# Patient Record
Sex: MALE | Race: WHITE | HISPANIC OR LATINO | Employment: UNEMPLOYED | URBAN - METROPOLITAN AREA
[De-identification: names, ages, dates, MRNs, and addresses within clinical notes are randomized per-mention and may not be internally consistent; named-entity substitution may affect disease eponyms.]

---

## 2022-10-19 ENCOUNTER — APPOINTMENT (INPATIENT)
Dept: RADIOLOGY | Facility: HOSPITAL | Age: 67
DRG: 637 | End: 2022-10-19
Payer: COMMERCIAL

## 2022-10-19 ENCOUNTER — APPOINTMENT (EMERGENCY)
Dept: RADIOLOGY | Facility: HOSPITAL | Age: 67
DRG: 637 | End: 2022-10-19
Payer: COMMERCIAL

## 2022-10-19 ENCOUNTER — HOSPITAL ENCOUNTER (INPATIENT)
Facility: HOSPITAL | Age: 67
LOS: 7 days | Discharge: HOME HEALTH | DRG: 637 | End: 2022-10-26
Attending: EMERGENCY MEDICINE | Admitting: ANESTHESIOLOGY
Payer: COMMERCIAL

## 2022-10-19 DIAGNOSIS — E10.9 INSULIN DEPENDENT TYPE 1 DIABETES MELLITUS (HCC): ICD-10-CM

## 2022-10-19 DIAGNOSIS — E16.2 HYPOGLYCEMIA: Primary | ICD-10-CM

## 2022-10-19 DIAGNOSIS — I10 ESSENTIAL HYPERTENSION: ICD-10-CM

## 2022-10-19 PROBLEM — D63.8 ANEMIA OF CHRONIC DISEASE: Status: ACTIVE | Noted: 2022-10-19

## 2022-10-19 PROBLEM — F99 PSYCHIATRIC DISORDER: Status: ACTIVE | Noted: 2022-10-19

## 2022-10-19 PROBLEM — E27.40 ADRENAL INSUFFICIENCY (HCC): Status: ACTIVE | Noted: 2022-10-19

## 2022-10-19 PROBLEM — E87.6 HYPOKALEMIA: Status: ACTIVE | Noted: 2022-10-19

## 2022-10-19 PROBLEM — Z86.73 HISTORY OF STROKE: Status: ACTIVE | Noted: 2022-10-19

## 2022-10-19 PROBLEM — I48.0 PAROXYSMAL ATRIAL FIBRILLATION (HCC): Status: ACTIVE | Noted: 2022-10-19

## 2022-10-19 PROBLEM — Z79.4 INSULIN DEPENDENT TYPE 2 DIABETES MELLITUS (HCC): Status: ACTIVE | Noted: 2022-10-19

## 2022-10-19 PROBLEM — E78.5 HYPERLIPIDEMIA: Status: ACTIVE | Noted: 2022-10-19

## 2022-10-19 PROBLEM — E11.9 INSULIN DEPENDENT TYPE 2 DIABETES MELLITUS (HCC): Status: ACTIVE | Noted: 2022-10-19

## 2022-10-19 LAB
AMMONIA PLAS-SCNC: 27 UMOL/L (ref 11–35)
ANION GAP SERPL CALCULATED.3IONS-SCNC: 6 MMOL/L (ref 4–13)
APTT PPP: 32 SECONDS (ref 23–37)
ARTERIAL PATENCY WRIST A: ABNORMAL
BASE EXCESS BLDA CALC-SCNC: 7 MMOL/L (ref -2–3)
BASOPHILS # BLD AUTO: 0.02 THOUSANDS/ÂΜL (ref 0–0.1)
BASOPHILS # BLD AUTO: 0.03 THOUSANDS/ÂΜL (ref 0–0.1)
BASOPHILS NFR BLD AUTO: 0 % (ref 0–1)
BASOPHILS NFR BLD AUTO: 0 % (ref 0–1)
BUN SERPL-MCNC: 8 MG/DL (ref 5–25)
CA-I BLD-SCNC: 1.26 MMOL/L (ref 1.12–1.32)
CALCIUM SERPL-MCNC: 8.4 MG/DL (ref 8.3–10.1)
CHLORIDE SERPL-SCNC: 111 MMOL/L (ref 96–108)
CO2 SERPL-SCNC: 27 MMOL/L (ref 21–32)
CREAT SERPL-MCNC: 0.99 MG/DL (ref 0.6–1.3)
EOSINOPHIL # BLD AUTO: 0.26 THOUSAND/ÂΜL (ref 0–0.61)
EOSINOPHIL # BLD AUTO: 0.28 THOUSAND/ÂΜL (ref 0–0.61)
EOSINOPHIL NFR BLD AUTO: 2 % (ref 0–6)
EOSINOPHIL NFR BLD AUTO: 4 % (ref 0–6)
ERYTHROCYTE [DISTWIDTH] IN BLOOD BY AUTOMATED COUNT: 13.3 % (ref 11.6–15.1)
ERYTHROCYTE [DISTWIDTH] IN BLOOD BY AUTOMATED COUNT: 13.4 % (ref 11.6–15.1)
GFR SERPL CREATININE-BSD FRML MDRD: 79 ML/MIN/1.73SQ M
GLUCOSE SERPL-MCNC: 110 MG/DL (ref 65–140)
GLUCOSE SERPL-MCNC: 125 MG/DL (ref 65–140)
GLUCOSE SERPL-MCNC: 155 MG/DL (ref 65–140)
GLUCOSE SERPL-MCNC: 190 MG/DL (ref 65–140)
GLUCOSE SERPL-MCNC: 195 MG/DL (ref 65–140)
GLUCOSE SERPL-MCNC: 196 MG/DL (ref 65–140)
GLUCOSE SERPL-MCNC: 199 MG/DL (ref 65–140)
GLUCOSE SERPL-MCNC: 304 MG/DL (ref 65–140)
GLUCOSE SERPL-MCNC: 397 MG/DL (ref 65–140)
GLUCOSE SERPL-MCNC: 402 MG/DL (ref 65–140)
GLUCOSE SERPL-MCNC: 459 MG/DL (ref 65–140)
GLUCOSE SERPL-MCNC: 62 MG/DL (ref 65–140)
GLUCOSE SERPL-MCNC: <20 MG/DL (ref 65–140)
GLUCOSE SERPL-MCNC: <20 MG/DL (ref 65–140)
HCO3 BLDA-SCNC: 32 MMOL/L (ref 22–28)
HCT VFR BLD AUTO: 28.7 % (ref 36.5–49.3)
HCT VFR BLD AUTO: 29.6 % (ref 36.5–49.3)
HCT VFR BLD CALC: 26 % (ref 36.5–49.3)
HGB BLD-MCNC: 9.2 G/DL (ref 12–17)
HGB BLD-MCNC: 9.5 G/DL (ref 12–17)
HGB BLDA-MCNC: 8.8 G/DL (ref 12–17)
IMM GRANULOCYTES # BLD AUTO: 0.03 THOUSAND/UL (ref 0–0.2)
IMM GRANULOCYTES # BLD AUTO: 0.08 THOUSAND/UL (ref 0–0.2)
IMM GRANULOCYTES NFR BLD AUTO: 0 % (ref 0–2)
IMM GRANULOCYTES NFR BLD AUTO: 1 % (ref 0–2)
LACTATE SERPL-SCNC: 1.3 MMOL/L (ref 0.5–2)
LYMPHOCYTES # BLD AUTO: 2.13 THOUSANDS/ÂΜL (ref 0.6–4.47)
LYMPHOCYTES # BLD AUTO: 2.24 THOUSANDS/ÂΜL (ref 0.6–4.47)
LYMPHOCYTES NFR BLD AUTO: 19 % (ref 14–44)
LYMPHOCYTES NFR BLD AUTO: 27 % (ref 14–44)
MCH RBC QN AUTO: 31.3 PG (ref 26.8–34.3)
MCH RBC QN AUTO: 31.7 PG (ref 26.8–34.3)
MCHC RBC AUTO-ENTMCNC: 32.1 G/DL (ref 31.4–37.4)
MCHC RBC AUTO-ENTMCNC: 32.1 G/DL (ref 31.4–37.4)
MCV RBC AUTO: 97 FL (ref 82–98)
MCV RBC AUTO: 99 FL (ref 82–98)
MONOCYTES # BLD AUTO: 1.27 THOUSAND/ÂΜL (ref 0.17–1.22)
MONOCYTES # BLD AUTO: 1.39 THOUSAND/ÂΜL (ref 0.17–1.22)
MONOCYTES NFR BLD AUTO: 11 % (ref 4–12)
MONOCYTES NFR BLD AUTO: 18 % (ref 4–12)
NEUTROPHILS # BLD AUTO: 3.98 THOUSANDS/ÂΜL (ref 1.85–7.62)
NEUTROPHILS # BLD AUTO: 7.95 THOUSANDS/ÂΜL (ref 1.85–7.62)
NEUTS SEG NFR BLD AUTO: 51 % (ref 43–75)
NEUTS SEG NFR BLD AUTO: 67 % (ref 43–75)
NRBC BLD AUTO-RTO: 0 /100 WBCS
NRBC BLD AUTO-RTO: 0 /100 WBCS
PCO2 BLD: 33 MMOL/L (ref 21–32)
PCO2 BLD: 47.6 MM HG (ref 36–44)
PH BLD: 7.44 [PH] (ref 7.35–7.45)
PLATELET # BLD AUTO: 178 THOUSANDS/UL (ref 149–390)
PLATELET # BLD AUTO: 210 THOUSANDS/UL (ref 149–390)
PMV BLD AUTO: 10.6 FL (ref 8.9–12.7)
PMV BLD AUTO: 10.8 FL (ref 8.9–12.7)
PO2 BLD: >400 MM HG (ref 75–129)
POTASSIUM BLD-SCNC: 3.8 MMOL/L (ref 3.5–5.3)
POTASSIUM SERPL-SCNC: 3.3 MMOL/L (ref 3.5–5.3)
RBC # BLD AUTO: 2.9 MILLION/UL (ref 3.88–5.62)
RBC # BLD AUTO: 3.04 MILLION/UL (ref 3.88–5.62)
SAMPLE SITE: ABNORMAL
SODIUM BLD-SCNC: 142 MMOL/L (ref 136–145)
SODIUM SERPL-SCNC: 144 MMOL/L (ref 135–147)
SPECIMEN SOURCE: ABNORMAL
WBC # BLD AUTO: 11.82 THOUSAND/UL (ref 4.31–10.16)
WBC # BLD AUTO: 7.84 THOUSAND/UL (ref 4.31–10.16)

## 2022-10-19 PROCEDURE — 99291 CRITICAL CARE FIRST HOUR: CPT | Performed by: ANESTHESIOLOGY

## 2022-10-19 PROCEDURE — 82948 REAGENT STRIP/BLOOD GLUCOSE: CPT

## 2022-10-19 PROCEDURE — 82803 BLOOD GASES ANY COMBINATION: CPT

## 2022-10-19 PROCEDURE — 96361 HYDRATE IV INFUSION ADD-ON: CPT

## 2022-10-19 PROCEDURE — 70450 CT HEAD/BRAIN W/O DYE: CPT

## 2022-10-19 PROCEDURE — 99285 EMERGENCY DEPT VISIT HI MDM: CPT

## 2022-10-19 PROCEDURE — 85025 COMPLETE CBC W/AUTO DIFF WBC: CPT | Performed by: NURSE PRACTITIONER

## 2022-10-19 PROCEDURE — NC001 PR NO CHARGE: Performed by: SURGERY

## 2022-10-19 PROCEDURE — 99223 1ST HOSP IP/OBS HIGH 75: CPT | Performed by: INTERNAL MEDICINE

## 2022-10-19 PROCEDURE — 31500 INSERT EMERGENCY AIRWAY: CPT

## 2022-10-19 PROCEDURE — 85730 THROMBOPLASTIN TIME PARTIAL: CPT | Performed by: NURSE PRACTITIONER

## 2022-10-19 PROCEDURE — 0BH17EZ INSERTION OF ENDOTRACHEAL AIRWAY INTO TRACHEA, VIA NATURAL OR ARTIFICIAL OPENING: ICD-10-PCS | Performed by: ANESTHESIOLOGY

## 2022-10-19 PROCEDURE — 83605 ASSAY OF LACTIC ACID: CPT | Performed by: NURSE PRACTITIONER

## 2022-10-19 PROCEDURE — 99222 1ST HOSP IP/OBS MODERATE 55: CPT | Performed by: INTERNAL MEDICINE

## 2022-10-19 PROCEDURE — 82330 ASSAY OF CALCIUM: CPT

## 2022-10-19 PROCEDURE — 31500 INSERT EMERGENCY AIRWAY: CPT | Performed by: SURGERY

## 2022-10-19 PROCEDURE — 85014 HEMATOCRIT: CPT

## 2022-10-19 PROCEDURE — 84295 ASSAY OF SERUM SODIUM: CPT

## 2022-10-19 PROCEDURE — 94002 VENT MGMT INPAT INIT DAY: CPT

## 2022-10-19 PROCEDURE — 0BH18EZ INSERTION OF ENDOTRACHEAL AIRWAY INTO TRACHEA, VIA NATURAL OR ARTIFICIAL OPENING ENDOSCOPIC: ICD-10-PCS | Performed by: ANESTHESIOLOGY

## 2022-10-19 PROCEDURE — 82947 ASSAY GLUCOSE BLOOD QUANT: CPT

## 2022-10-19 PROCEDURE — 80048 BASIC METABOLIC PNL TOTAL CA: CPT | Performed by: STUDENT IN AN ORGANIZED HEALTH CARE EDUCATION/TRAINING PROGRAM

## 2022-10-19 PROCEDURE — 85025 COMPLETE CBC W/AUTO DIFF WBC: CPT | Performed by: STUDENT IN AN ORGANIZED HEALTH CARE EDUCATION/TRAINING PROGRAM

## 2022-10-19 PROCEDURE — G1004 CDSM NDSC: HCPCS

## 2022-10-19 PROCEDURE — 5A1935Z RESPIRATORY VENTILATION, LESS THAN 24 CONSECUTIVE HOURS: ICD-10-PCS | Performed by: ANESTHESIOLOGY

## 2022-10-19 PROCEDURE — 84132 ASSAY OF SERUM POTASSIUM: CPT

## 2022-10-19 PROCEDURE — NC001 PR NO CHARGE: Performed by: PHYSICIAN ASSISTANT

## 2022-10-19 PROCEDURE — 94760 N-INVAS EAR/PLS OXIMETRY 1: CPT

## 2022-10-19 PROCEDURE — 36415 COLL VENOUS BLD VENIPUNCTURE: CPT | Performed by: STUDENT IN AN ORGANIZED HEALTH CARE EDUCATION/TRAINING PROGRAM

## 2022-10-19 PROCEDURE — 82140 ASSAY OF AMMONIA: CPT | Performed by: NURSE PRACTITIONER

## 2022-10-19 PROCEDURE — 96360 HYDRATION IV INFUSION INIT: CPT

## 2022-10-19 PROCEDURE — 36600 WITHDRAWAL OF ARTERIAL BLOOD: CPT

## 2022-10-19 PROCEDURE — 93005 ELECTROCARDIOGRAM TRACING: CPT

## 2022-10-19 RX ORDER — HYDROCORTISONE 10 MG/1
10 TABLET ORAL EVERY 24 HOURS
Status: DISCONTINUED | OUTPATIENT
Start: 2022-10-19 | End: 2022-10-20

## 2022-10-19 RX ORDER — FLUDROCORTISONE ACETATE 0.1 MG/1
0.2 TABLET ORAL DAILY
Status: DISCONTINUED | OUTPATIENT
Start: 2022-10-19 | End: 2022-10-26 | Stop reason: HOSPADM

## 2022-10-19 RX ORDER — DEXTROSE 50 % IN WATER 50 %
SYRINGE (ML) INTRAVENOUS CODE/TRAUMA/SEDATION MEDICATION
Status: COMPLETED | OUTPATIENT
Start: 2022-10-19 | End: 2022-10-19

## 2022-10-19 RX ORDER — FENTANYL CITRATE 50 UG/ML
INJECTION, SOLUTION INTRAMUSCULAR; INTRAVENOUS
Status: COMPLETED
Start: 2022-10-19 | End: 2022-10-19

## 2022-10-19 RX ORDER — SERTRALINE HYDROCHLORIDE 100 MG/1
100 TABLET, FILM COATED ORAL DAILY
COMMUNITY
Start: 2022-03-16 | End: 2023-03-16

## 2022-10-19 RX ORDER — ATORVASTATIN CALCIUM 10 MG/1
10 TABLET, FILM COATED ORAL
Status: DISCONTINUED | OUTPATIENT
Start: 2022-10-20 | End: 2022-10-26 | Stop reason: HOSPADM

## 2022-10-19 RX ORDER — SUCCINYLCHOLINE/SOD CL,ISO/PF 100 MG/5ML
SYRINGE (ML) INTRAVENOUS CODE/TRAUMA/SEDATION MEDICATION
Status: COMPLETED | OUTPATIENT
Start: 2022-10-19 | End: 2022-10-19

## 2022-10-19 RX ORDER — ASPIRIN 81 MG/1
81 TABLET, CHEWABLE ORAL DAILY
Status: DISCONTINUED | OUTPATIENT
Start: 2022-10-20 | End: 2022-10-26 | Stop reason: HOSPADM

## 2022-10-19 RX ORDER — SERTRALINE HYDROCHLORIDE 100 MG/1
100 TABLET, FILM COATED ORAL DAILY
Status: DISCONTINUED | OUTPATIENT
Start: 2022-10-19 | End: 2022-10-26 | Stop reason: HOSPADM

## 2022-10-19 RX ORDER — RISPERIDONE 0.25 MG/1
0.5 TABLET, FILM COATED ORAL 2 TIMES DAILY
Status: DISCONTINUED | OUTPATIENT
Start: 2022-10-19 | End: 2022-10-26 | Stop reason: HOSPADM

## 2022-10-19 RX ORDER — INSULIN HUMAN 100 [IU]/ML
6 INJECTION, SOLUTION PARENTERAL
COMMUNITY
Start: 2022-09-25 | End: 2022-10-26

## 2022-10-19 RX ORDER — FENTANYL CITRATE 50 UG/ML
100 INJECTION, SOLUTION INTRAMUSCULAR; INTRAVENOUS ONCE
Status: COMPLETED | OUTPATIENT
Start: 2022-10-19 | End: 2022-10-19

## 2022-10-19 RX ORDER — POTASSIUM CHLORIDE 750 MG/1
20 TABLET, FILM COATED, EXTENDED RELEASE ORAL 2 TIMES DAILY
COMMUNITY
Start: 2022-10-13 | End: 2023-10-13

## 2022-10-19 RX ORDER — ATORVASTATIN CALCIUM 10 MG/1
1 TABLET, FILM COATED ORAL DAILY
COMMUNITY
Start: 2022-07-12

## 2022-10-19 RX ORDER — HYDROCORTISONE 10 MG/1
10 TABLET ORAL 2 TIMES DAILY
COMMUNITY
Start: 2022-08-17

## 2022-10-19 RX ORDER — HYDROCORTISONE 10 MG/1
10 TABLET ORAL 2 TIMES DAILY
Status: DISCONTINUED | OUTPATIENT
Start: 2022-10-19 | End: 2022-10-19

## 2022-10-19 RX ORDER — ASPIRIN 81 MG/1
81 TABLET, CHEWABLE ORAL DAILY
COMMUNITY

## 2022-10-19 RX ORDER — MIDODRINE HYDROCHLORIDE 5 MG/1
10 TABLET ORAL
Status: DISCONTINUED | OUTPATIENT
Start: 2022-10-19 | End: 2022-10-22

## 2022-10-19 RX ORDER — FLUDROCORTISONE ACETATE 0.1 MG/1
0.2 TABLET ORAL DAILY
COMMUNITY
Start: 2022-09-26 | End: 2022-11-08 | Stop reason: SDUPTHER

## 2022-10-19 RX ORDER — HYDROCORTISONE 10 MG/1
10 TABLET ORAL EVERY 24 HOURS
Status: DISCONTINUED | OUTPATIENT
Start: 2022-10-20 | End: 2022-10-20

## 2022-10-19 RX ORDER — CHLORHEXIDINE GLUCONATE 0.12 MG/ML
15 RINSE ORAL EVERY 12 HOURS SCHEDULED
Status: DISCONTINUED | OUTPATIENT
Start: 2022-10-19 | End: 2022-10-26 | Stop reason: HOSPADM

## 2022-10-19 RX ORDER — RISPERIDONE 0.5 MG/1
1 TABLET, FILM COATED ORAL 2 TIMES DAILY
COMMUNITY
Start: 2022-07-11

## 2022-10-19 RX ORDER — PROPOFOL 10 MG/ML
5-50 INJECTION, EMULSION INTRAVENOUS
Status: DISCONTINUED | OUTPATIENT
Start: 2022-10-19 | End: 2022-10-20

## 2022-10-19 RX ORDER — FENTANYL CITRATE 50 UG/ML
50 INJECTION, SOLUTION INTRAMUSCULAR; INTRAVENOUS
Status: DISCONTINUED | OUTPATIENT
Start: 2022-10-19 | End: 2022-10-20

## 2022-10-19 RX ORDER — ETOMIDATE 2 MG/ML
INJECTION INTRAVENOUS CODE/TRAUMA/SEDATION MEDICATION
Status: COMPLETED | OUTPATIENT
Start: 2022-10-19 | End: 2022-10-19

## 2022-10-19 RX ORDER — POTASSIUM CHLORIDE 750 MG/1
20 TABLET, EXTENDED RELEASE ORAL 2 TIMES DAILY
Status: DISCONTINUED | OUTPATIENT
Start: 2022-10-19 | End: 2022-10-20

## 2022-10-19 RX ORDER — MIDODRINE HYDROCHLORIDE 10 MG/1
10 TABLET ORAL
COMMUNITY
Start: 2022-09-26 | End: 2022-10-26

## 2022-10-19 RX ORDER — DEXTROSE MONOHYDRATE 25 G/50ML
INJECTION, SOLUTION INTRAVENOUS
Status: DISPENSED
Start: 2022-10-19 | End: 2022-10-20

## 2022-10-19 RX ORDER — MAGNESIUM SULFATE HEPTAHYDRATE 40 MG/ML
2 INJECTION, SOLUTION INTRAVENOUS ONCE
Status: COMPLETED | OUTPATIENT
Start: 2022-10-19 | End: 2022-10-19

## 2022-10-19 RX ORDER — INSULIN LISPRO 100 [IU]/ML
1-6 INJECTION, SOLUTION INTRAVENOUS; SUBCUTANEOUS
Status: DISCONTINUED | OUTPATIENT
Start: 2022-10-19 | End: 2022-10-20

## 2022-10-19 RX ADMIN — FENTANYL CITRATE 100 MCG: 50 INJECTION INTRAMUSCULAR; INTRAVENOUS at 23:45

## 2022-10-19 RX ADMIN — RISPERIDONE 0.5 MG: 0.25 TABLET ORAL at 13:45

## 2022-10-19 RX ADMIN — MIDODRINE HYDROCHLORIDE 10 MG: 5 TABLET ORAL at 13:45

## 2022-10-19 RX ADMIN — SODIUM CHLORIDE 1000 ML: 0.9 INJECTION, SOLUTION INTRAVENOUS at 07:20

## 2022-10-19 RX ADMIN — APIXABAN 5 MG: 5 TABLET, FILM COATED ORAL at 17:28

## 2022-10-19 RX ADMIN — POTASSIUM CHLORIDE 20 MEQ: 750 TABLET, EXTENDED RELEASE ORAL at 17:31

## 2022-10-19 RX ADMIN — SERTRALINE 100 MG: 100 TABLET, FILM COATED ORAL at 13:41

## 2022-10-19 RX ADMIN — Medication 12.5 MG: at 13:41

## 2022-10-19 RX ADMIN — DEXTROSE MONOHYDRATE 25 G: 500 INJECTION PARENTERAL at 22:17

## 2022-10-19 RX ADMIN — Medication 12.5 MG: at 17:31

## 2022-10-19 RX ADMIN — Medication 100 MG: at 22:17

## 2022-10-19 RX ADMIN — MIDODRINE HYDROCHLORIDE 10 MG: 5 TABLET ORAL at 17:33

## 2022-10-19 RX ADMIN — HYDROCORTISONE 10 MG: 10 TABLET ORAL at 16:10

## 2022-10-19 RX ADMIN — FLUDROCORTISONE ACETATE 0.2 MG: 0.1 TABLET ORAL at 13:41

## 2022-10-19 RX ADMIN — RISPERIDONE 0.5 MG: 0.25 TABLET ORAL at 17:32

## 2022-10-19 RX ADMIN — FENTANYL CITRATE 100 MCG: 50 INJECTION, SOLUTION INTRAMUSCULAR; INTRAVENOUS at 23:45

## 2022-10-19 RX ADMIN — MAGNESIUM SULFATE HEPTAHYDRATE 2 G: 40 INJECTION, SOLUTION INTRAVENOUS at 16:11

## 2022-10-19 RX ADMIN — INSULIN HUMAN 4 UNITS: 100 INJECTION, SUSPENSION SUBCUTANEOUS at 16:17

## 2022-10-19 RX ADMIN — INSULIN LISPRO 4 UNITS: 100 INJECTION, SOLUTION INTRAVENOUS; SUBCUTANEOUS at 16:08

## 2022-10-19 RX ADMIN — ETOMIDATE 20 MG: 20 INJECTION, SOLUTION INTRAVENOUS at 22:17

## 2022-10-19 NOTE — ASSESSMENT & PLAN NOTE
Recurrent ED visits (3) in the last week for similar episodes - today blood sugar dropped to 35 requiring glucagon dosing by EMS w/ rebound and improvement in mentation back to baseline  Hold basal/prandial insulin - maintain slowly on SSI coverage per Accu-Cheks at this time  Check proinsulin and C-peptide - check fasting insulin tomorrow morning - continue to monitor blood sugars and implement hypoglycemia protocol as necessary  Further workup per endocrinology

## 2022-10-19 NOTE — CASE MANAGEMENT
Case Management ED Discharge Planning Note    Patient name Jami Rivera  Location ED 16/ED 16 MRN [id number]  : 1955 Date 10/19/2022        OBJECTIVE:  Predictive Model Details         34% Factor Value    Risk of Hospital Admission or ED Visit Model Number of ED Visits 1     Is in Relationship No       Chief Complaint: Hypoglycemia   Patient Class: Emergency  [facility]acy[facility]   [id number] [address] [facility]  Phone: [phone] Fax: [phone]    Primary Care Provider: No primary care provider on file  Primary Insurance: [address]  REP  Secondary Insurance:     ED Discharge Details:    Discharge planning discussed with[de-identified] daughter, Florence Forsyth of Choice: Yes     CM contacted family/caregiver?: Yes     Contacts  Patient Contacts: Channing Him  Relationship to Patient[de-identified] Family  Contact Method: In Person  Reason/Outcome: Continuity of Care, Emergency Contact, Discharge Planning              Other Referral/Resources/Interventions Provided:  Interventions: Other (Specify)  Referral Comments: Pt's daughter was provided with information for [facility], a community based program that assists pts and families with home servies or placement if they are interested  Daughter states that she is aware of Waiver services and will reach out agian today to discuss with [facility]  Treatment Team Recommendation: Home  Discharge Destination Plan[de-identified] Home     Transport at Discharge : Family      Additional Comments: CM met with pt and daughter at bedside due to c/s for ISAR>=3  At bedside CM discussed needs with pt and daughter  Per Cyn Aburto (daughter), pt lives at home with his SO, Eleno Prabhakar, who has dementia  Pt struggles cognitively s/p heart attack and stroke in  August and has been in and out of rehab facilities since  Pt was [facility] last Tuesday and has been home since    Pt's daughter states that while she does not live in the home with her father she is very involved in his care  Sonia states she is there for breakfast, lunch, and dinner as well as getting pt ready in the mornings and ready for bed at night  Sonia states that she has been following pt's insulin regiment which is currently at meals and before bed (states it was changed while pt was at [facility])  Sonia states that [place] at which time she met pt at an OSH where he was admitted to the ED for hypoglycemia  Lubna Antonio states that he was d/c around 1130pm and pt's SO was calling her between 4:30 am and 5am because pt's blood suggar had dropped again  Lubna Alvarez states that while she was away her brother who normally helps assisted pt more and Sonia had friends who were nurses coming to help in the home  NISH discussed [facility] services with Sonia who states she is already aware of Waiver and has calls into the [facility], she states she will follow up with them this week  CM also provided  Sonia with information for [facility]  NISH briefly touched on LTC placement or assisted living and CM was told that if needed, the long term plan would be for pt to move in with Sonia, they are not interested in a long term placement of any kind  CM discussed [facility] with Sonia who states they already have [facility] in place for PT/OT and SN

## 2022-10-19 NOTE — CASE MANAGEMENT
Case Management ED Assessment    Patient name Irene Parry  Location ED 16/ED 16 MRN [id number]  : 1955 Date 10/19/2022        OBJECTIVE:  Predictive Model Details         34% Factor Value    Risk of Hospital Admission or ED Visit Model Number of ED Visits 1     Is in Relationship No       Chief Complaint: Hypoglycemia   Patient Class: Emergency  [facility]acy[facility]   [id number], [address] [place] [facility]  Phone: [phone] Fax: [phone]    Primary Care Provider: No primary care provider on file  Primary Insurance: [facility] RE[facility]  Secondary Insurance:     ED ASSESSMENT:  [place] 26 Proxies    There are no active Health Care Proxies on file         Readmission Root Cause  30 Day Readmission: Yes  Who directed you to return to the hospital?: Family  Did you understand whom to contact if you had questions or problems?: Yes  Did you get your prescriptions before you left the hospital?: Yes  Were you able to get your prescriptions filled when you left the hospital?: Yes  Did you take your medications as prescribed?: Yes  Were you able to get to your follow-up appointments?: Yes  Patient was readmitted due to: hypoglycemia    Outpatient Care Information  Have you seen a doctor in the last year?: Yes  Specify which physician group(s) you have seen in the past year : Other  Specialist(s) seen outside of [facility] or [facility]: PCP, Cardiology, Endocrine    Prescribed Medications Prior to Admission/ED Visit  Has patient been prescribed medications (prior to this ED visit/admission)?: Yes  Any difficulty obtaining medications?: No  Has the patient been taking all prescribed medication(s)?: Yes  Does the patient have difficulty affording medication(s)?: No    Patient Information  Admitted from[de-identified] Home  Mental Status: Alert (alert to baseline per family)  During Assessment patient was accompanied by: Daughter  Assessment information provided by[de-identified] Daughter  Primary Caregiver: Family  Caregiver's Name[de-identified] Erich Hutchison- daughter  Caregiver's Relationship to Patient[de-identified] Family Member  Caregiver's Telephone Number[de-identified] [phone]  Support Systems: Spouse/significant other, Cammie Gasca Dr of Residence: [address] [place] [address] do you live in?: [place]  Type of Current Residence: 2 story home  In the last 12 months, was there a time when you were not able to pay the mortgage or rent on time?: No  In the last 12 months, how many places have you lived?: 1  In the last 12 months, was there a time when you did not have a steady place to sleep or slept in a shelter (including now)?: No  Homeless/housing insecurity resource given?: No  Living Arrangements: Lives w/ Spouse/significant other    Patient Information Continued  Income Source: Pension/MCFP  Does patient have prescription coverage?: Yes  Within the past 12 months, you worried that your food would run out before you got the money to buy more : Never true  Within the past 12 months, the food you bought just didn't last and you didn't have money to get more : Never true  Food insecurity resource given?: No  Does patient receive dialysis treatments?: No  Does patient have a history of substance abuse?: No  Does patient have a history of Mental Health Diagnosis?: No    Food and Transportation  What is patient's usual means of transportation?: Family Transport  Ask patient:  How would you describe your eating habits?: Good Consent (Lip)/Introductory Paragraph: The rationale for Mohs was explained to the patient and consent was obtained. The risks, benefits and alternatives to therapy were discussed in detail. Specifically, the risks of lip deformity, changes in the oral aperture, infection, scarring, bleeding, prolonged wound healing, incomplete removal, allergy to anesthesia, nerve injury and recurrence were addressed. Prior to the procedure, the treatment site was clearly identified and confirmed by the patient. All components of Universal Protocol/PAUSE Rule completed.

## 2022-10-19 NOTE — ED ATTENDING ATTESTATION
10/19/2022  ILatasha MD, saw and evaluated the patient  I have discussed the patient with the resident/non-physician practitioner and agree with the resident's/non-physician practitioner's findings, Plan of Care, and MDM as documented in the resident's/non-physician practitioner's note, except where noted  All available labs and Radiology studies were reviewed  I was present for key portions of any procedure(s) performed by the resident/non-physician practitioner and I was immediately available to provide assistance  At this point I agree with the current assessment done in the Emergency Department  I have conducted an independent evaluation of this patient a history and physical is as follows:    ED Course     62-year-old male, history of insulin-dependent diabetes, presenting to the emergency department for evaluation of hypoglycemia  Patient was found unresponsive this morning by his partner  On EMS arrival the patient's blood glucose was in the 30s  Patient received glucagon pre-hospital   Review of patient's medical record reveals that he was seen on October 13th and October 18th at [facility] Emergency Department for hypoglycemic episodes  Patient is unclear about his current insulin regimen  He states that he took insulin at night without checking his blood glucose  He believes that he takes his long-acting insulin at night  Review of his medical record however reveals that he is scheduled to take NPH in the morning with breakfast as well as schedule doses of regular insulin with his meals  Patient denies any fever, chest pain, cough, shortness of breath, recent change in weight  Ten systems reviewed and negative except as noted  The patient is resting comfortably on a stretcher in no acute respiratory distress  The patient appears nontoxic  HEENT reveals moist mucous membranes  Head is normocephalic and atraumatic   Conjunctiva and sclera are normal  Neck is nontender and supple with full range of motion to flexion, extension, lateral rotation  No meningismus appreciated  No masses are appreciated  Lungs are clear to auscultation bilaterally without any wheezes, rales or rhonchi  Heart is regular rate and rhythm without any murmurs, rubs or gallops  Abdomen is soft and nontender without any rebound or guarding  Extremities appear grossly normal without any significant arthropathy  Patient is awake, alert, and oriented x3  The patient has normal interaction  Motor is 5 out of 5            Labs Reviewed   CBC AND DIFFERENTIAL - Abnormal       Result Value Ref Range Status    WBC 11 82 (*) 4 31 - 10 16 Thousand/uL Final    RBC 2 90 (*) 3 88 - 5 62 Million/uL Final    Hemoglobin 9 2 (*) 12 0 - 17 0 g/dL Final    Hematocrit 28 7 (*) 36 5 - 49 3 % Final    MCV 99 (*) 82 - 98 fL Final    MCH 31 7  26 8 - 34 3 pg Final    MCHC 32 1  31 4 - 37 4 g/dL Final    RDW 13 3  11 6 - 15 1 % Final    MPV 10 8  8 9 - 12 7 fL Final    Platelets 535  437 - 390 Thousands/uL Final    nRBC 0  /100 WBCs Final    Neutrophils Relative 67  43 - 75 % Final    Immat GRANS % 1  0 - 2 % Final    Lymphocytes Relative 19  14 - 44 % Final    Monocytes Relative 11  4 - 12 % Final    Eosinophils Relative 2  0 - 6 % Final    Basophils Relative 0  0 - 1 % Final    Neutrophils Absolute 7 95 (*) 1 85 - 7 62 Thousands/µL Final    Immature Grans Absolute 0 08  0 00 - 0 20 Thousand/uL Final    Lymphocytes Absolute 2 24  0 60 - 4 47 Thousands/µL Final    Monocytes Absolute 1 27 (*) 0 17 - 1 22 Thousand/µL Final    Eosinophils Absolute 0 26  0 00 - 0 61 Thousand/µL Final    Basophils Absolute 0 02  0 00 - 0 10 Thousands/µL Final   BASIC METABOLIC PANEL - Abnormal    Sodium 144  135 - 147 mmol/L Final    Potassium 3 3 (*) 3 5 - 5 3 mmol/L Final    Chloride 111 (*) 96 - 108 mmol/L Final    CO2 27  21 - 32 mmol/L Final    ANION GAP 6  4 - 13 mmol/L Final    BUN 8  5 - 25 mg/dL Final    Creatinine 0 99  0 60 - 1 30 mg/dL Final    Comment: Standardized to IDMS reference method    Glucose 155 (*) 65 - 140 mg/dL Final    Comment: If the patient is fasting, the ADA then defines impaired fasting glucose as > 100 mg/dL and diabetes as > or equal to 123 mg/dL  Specimen collection should occur prior to Sulfasalazine administration due to the potential for falsely depressed results  Specimen collection should occur prior to Sulfapyridine administration due to the potential for falsely elevated results      Calcium 8 4  8 3 - 10 1 mg/dL Final    eGFR 79  ml/min/1 73sq m Final    Narrative:     [place] guidelines for Chronic Kidney Disease (CKD):   •  Stage 1 with normal or high GFR (GFR > 90 mL/min/1 73 square meters)  •  Stage 2 Mild CKD (GFR = 60-89 mL/min/1 73 square meters)  •  Stage 3A Moderate CKD (GFR = 45-59 mL/min/1 73 square meters)  •  Stage 3B Moderate CKD (GFR = 30-44 mL/min/1 73 square meters)  •  Stage 4 Severe CKD (GFR = 15-29 mL/min/1 73 square meters)  •  Stage 5 End Stage CKD (GFR <15 mL/min/1 73 square meters)  Note: GFR calculation is accurate only with a steady state creatinine   POCT GLUCOSE - Abnormal    POC Glucose 402 (*) 65 - 140 mg/dl Final           Critical Care Time  Procedures

## 2022-10-19 NOTE — H&P
[address] [facility]  [address] 1955, 74 y o  male MRN: [id number]  Unit/Bed#: -01 Encounter: [id number]  Primary Care Provider: No primary care provider on file     Date and time admitted to hospital: 10/19/2022  6:33 AM      * Recurrent hypoglycemia  Assessment & Plan  Recurrent ED visits (3) in the last week for similar episodes - today blood sugar dropped to 35 requiring glucagon dosing by EMS w/ rebound and improvement in mentation back to baseline  Hold basal/prandial insulin - maintain slowly on SSI coverage per Accu-Cheks at this time  Check proinsulin and C-peptide - check fasting insulin tomorrow morning - continue to monitor blood sugars and implement hypoglycemia protocol as necessary  Further workup per endocrinology    Insulin dependent diabetes mellitus  Assessment & Plan  Lab Results   Component Value Date    HGBA1C 7 5 (H) 07/22/2022     Hold basal/prandial insulin dosing now in the setting of primary issue - slowly on SSI coverage per Accu-Cheks at this time  Carbohydrate restricted diet  Endocrinology to follow    Adrenal insufficiency   Assessment & Plan  Continue Florinef/Cortef regimen - can increase to “stress doses” if necessary  Monitor for hypotension -> continue Midodrine  Endocrinology to follow    Hypokalemia  Assessment & Plan  Monitor/replete serum potassium and magnesium deficiencies as present    Essential hypertension  Assessment & Plan  Continue Lopressor  Paradoxically on Midodrine for intermittent hypotensive states    Paroxysmal atrial fibrillation   Assessment & Plan  Rate controlled on Lopressor  On Eliquis for anticoagulation    History of stroke  Assessment & Plan  Continue ASA/statin    Hyperlipidemia  Assessment & Plan  Continue statin    Psychiatric disorder  Assessment & Plan  Continue Risperdal/Zoloft home regimen    Anemia of chronic disease  Assessment & Plan  H/H stable      DVT Prophylaxis:  Eliquis    Code Status: Full code    Discussion with: Patient at bedside    Anticipated Length of Stay:  Patient will be admitted on an Inpatient basis with an anticipated length of stay of greater than 2 midnights  Justification for Hospital Stay: Recurrent hypoglycemia requiring blood sugar monitoring and endocrinology evaluation  Total Time for Visit, including Counseling / Coordination of Care: 72 minutes  Greater than 50% of this total time spent on direct patient counseling and coordination of care  Chief Complaint:  Low blood sugars      History of Present Illness:    Ned Scott is a 74 y o  male who presented via EMS earlier this morning after sustaining an episode of hypoglycemia with reported blood sugar of approximately 35 requiring glucagon administration via EMS, which unfortunately led to resolution of altered mentation back to baseline  This is apparently the third episode of such hypoglycemia in the last week requiring multiple ED visits, most recently just yesterday, @ [facility], at which time, he was discharged home after improvement  In the ED, he was given a bolus of IV fluids, as blood sugar on arrival was actually elevated at 402 (likely due to glucagon), with a subsequent reading in the 196 after IV fluids  A CT of head was negative for acute intracranial abnormalities, similar to imaging done yesterday @ [facility]  At the time of my encounter post-arrival to the medical floor, he is resting bed fairly comfortably only complaining of some weakness/fatigue  States that his prior headache has now resolved  Denies any recent changes in his insulin regimen and states he administers the regimen as instructed  Denies any recent changes in diet or lack of appetite  Denies any recent infections or illnesses  Overall, he remains in pleasant and hopeful spirits  Review of Systems:    Review of Systems - A thorough 12 point review systems was conducted    Pertinent positives and negatives are mentioned in the history of present illness  Past Medical and Surgical History:     Past Medical History:   Diagnosis Date   • Diabetes mellitus ([facility] [address] )        History reviewed  No pertinent surgical history  Medications & Allergies:    Prior to Admission medications    Medication Sig Start Date End Date Taking?  Authorizing Provider   apixaban (ELIQUIS) 5 mg Take 5 mg by mouth 2 (two) times a day 10/13/22  Yes Historical Provider, MD   aspirin 81 mg chewable tablet Chew 81 mg daily   Yes Historical Provider, MD   atorvastatin (LIPITOR) 10 mg tablet Take 1 tablet by mouth daily 7/12/22  Yes Historical Provider, MD   fludrocortisone (FLORINEF) 0 1 mg tablet Take 0 2 mg by mouth daily 9/26/22 9/26/23 Yes Historical Provider, MD   hydrocortisone (CORTEF) 10 mg tablet Take 10 mg by mouth 2 (two) times a day 8/17/22  Yes Historical Provider, MD   insulin NPH (HumuLIN N,NovoLIN N) 100 Units/mL subcutaneous injection Inject 18 Units under the skin daily with breakfast 9/25/22  Yes Historical Provider, MD   insulin regular (HumuLIN R) 100 units/mL injection Inject 6 Units under the skin 3 (three) times a day before meals 9/25/22  Yes Historical Provider, MD   metoprolol tartrate (LOPRESSOR) 25 mg tablet Take 12 5 mg by mouth 2 (two) times a day 9/8/22  Yes Historical Provider, MD   midodrine (PROAMATINE) 10 MG tablet Take 10 mg by mouth 3 (three) times daily after meals 9/26/22 10/26/22 Yes Historical Provider, MD   NON FORMULARY Take 30 mcg by mouth daily at bedtime Rayaldee 30 mcg @ bedtime   Yes Historical Provider, MD   NON FORMULARY 1 Bottle if needed Relion Glucose   Yes Historical Provider, MD   potassium chloride (Klor-Con) 10 mEq tablet Take 20 mEq by mouth 2 (two) times a day 10/13/22 10/13/23 Yes Historical Provider, MD   risperiDONE (RisperDAL) 0 5 mg tablet Take 1 tablet by mouth 2 (two) times a day 7/11/22  Yes Historical Provider, MD   sertraline (ZOLOFT) 100 mg tablet Take 100 mg by mouth daily 3/16/22 3/16/23 Yes Historical Provider, MD         Allergies:    Allergies   Allergen Reactions   • Imipramine Other (See Comments)   • Lisinopril Other (See Comments)   • Paroxetine Other (See Comments)   • Penicillins Hives   • Rosiglitazone Other (See Comments)   • Troglitazone Other (See Comments)         Social History:    Substance Use History:   Social History     Substance and Sexual Activity   Alcohol Use Not Currently   • Alcohol/week: 5 0 standard drinks   • Types: 5 Cans of beer per week    Comment: Quit in august     Social History     Tobacco Use   Smoking Status Former Smoker   • Years: 3 00   • Types: Cigarettes   Smokeless Tobacco Not on file     Social History     Substance and Sexual Activity   Drug Use Never         Family History:    Noncontributory      Physical Exam:     Vitals:   Blood Pressure: 154/78 (10/19/22 1100)  Pulse: 76 (10/19/22 1100)  Temperature: 98 8 °F (37 1 °C) (10/19/22 1100)  Temp Source: Oral (10/19/22 1100)  Respirations: 18 (10/19/22 1100)  Height: 5' 4" (162 6 cm) (10/19/22 1100)  Weight - Scale: 89 8 kg (198 lb) (10/19/22 1100)  SpO2: 98 % (10/19/22 1100)      GENERAL:  Mildly weak/fatigued  HEAD:  Normocephalic - atraumatic  EYES: PERRL - EOMI   MOUTH:  Mucosa moist  NECK:  Supple - full range of motion  CARDIAC:  Rate controlled - S1/S2 positive  PULMONARY:  Clear breath sounds bilaterally - nonlabored respirations  ABDOMEN:  Soft - nontender/nondistended - active bowel sounds  MUSCULOSKELETAL:  Motor strength/range of motion at baseline  NEUROLOGIC:  Alert/oriented at baseline  SKIN:  Chronic wrinkles/blemishes   PSYCHIATRIC:  Mood/affect stable      Additional Data:     Labs & Recent Cultures:    Results from last 7 days   Lab Units 10/19/22  0733   WBC Thousand/uL 11 82*   HEMOGLOBIN g/dL 9 2*   HEMATOCRIT % 28 7*   PLATELETS Thousands/uL 178   NEUTROS PCT % 67   LYMPHS PCT % 19   MONOS PCT % 11   EOS PCT % 2     Results from last 7 days   Lab Units 10/19/22  0733   SODIUM mmol/L 144   POTASSIUM mmol/L 3 3*   CHLORIDE mmol/L 111*   CO2 mmol/L 27   BUN mg/dL 8   CREATININE mg/dL 0 99   ANION GAP mmol/L 6   CALCIUM mg/dL 8 4   GLUCOSE RANDOM mg/dL 155*         Results from last 7 days   Lab Units 10/19/22  1206 10/19/22  1059 10/19/22  0636   POC GLUCOSE mg/dl 199* 196* 402*                         Lines/Drains:  Invasive Devices  Report    None                   Imaging:     CT head without contrast    Result Date: 10/19/2022  Narrative: CT BRAIN - WITHOUT CONTRAST INDICATION:   Headache, sudden, severe headache  COMPARISON:  None  TECHNIQUE:  CT examination of the brain was performed  In addition to axial images, sagittal and coronal 2D reformatted images were created and submitted for interpretation  Radiation dose length product (DLP) for this visit:  829 25 mGy-cm   This examination, like all CT scans performed in the [facility], was performed utilizing techniques to minimize radiation dose exposure, including the use of iterative  reconstruction and automated exposure control  IMAGE QUALITY:  Diagnostic  FINDINGS: PARENCHYMA: Cerebral volume loss  No intracranial mass, mass effect or midline shift  No CT signs of acute infarction  No acute parenchymal hemorrhage  Small chronic infarct involving left basal ganglia and anterior left internal capsule  Chronic right basal ganglia lacunar infarct  Nonspecific  decreased attenuation involving periventricular and subcortical white matter, most compatible with mild microangiopathic change  VENTRICLES AND EXTRA-AXIAL SPACES:  Normal for the patient's age  VISUALIZED ORBITS AND PARANASAL SINUSES:  Orbits are unremarkable  No complete opacification of right maxillary sinus containing hyperattenuating materials  CALVARIUM AND EXTRACRANIAL SOFT TISSUES:  Normal      Impression: 1  No acute intracranial CT abnormality  2   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   3   Near complete opacification of right maxillary sinus containing hyperattenuating materials, likely inspissated secretions versus fungal colonization  Workstation performed: [id number]                   ** Please Note: This note is constructed using a voice recognition dictation system  An occasional wrong word/phrase or “sound-a-like” substitution may have been picked up by dictation device due to the inherent limitations of voice recognition software  Read the chart carefully and recognize, using reasonable context, where substitutions may have occurred  **

## 2022-10-19 NOTE — ASSESSMENT & PLAN NOTE
Lab Results   Component Value Date    HGBA1C 7 5 (H) 07/22/2022     Hold basal/prandial insulin dosing now in the setting of primary issue - slowly on SSI coverage per Accu-Cheks at this time  Carbohydrate restricted diet  Endocrinology to follow

## 2022-10-19 NOTE — PLAN OF CARE
Problem: Potential for Falls  Goal: Patient will remain free of falls  Description: INTERVENTIONS:  - Educate patient/family on patient safety including physical limitations  - Instruct patient to call for assistance with activity   - Consult OT/PT to assist with strengthening/mobility   - Keep Call bell within reach  - Keep bed low and locked with side rails adjusted as appropriate  - Keep care items and personal belongings within reach  - Initiate and maintain comfort rounds  - Make Fall Risk Sign visible to staff  - Offer Toileting every 2 Hours, in advance of need  - Initiate/Maintain bed alarm  - Obtain necessary fall risk management equipment:   - Apply yellow socks and bracelet for high fall risk patients  - Consider moving patient to room near nurses station  Outcome: Progressing     Problem: PAIN - ADULT  Goal: Verbalizes/displays adequate comfort level or baseline comfort level  Description: Interventions:  - Encourage patient to monitor pain and request assistance  - Assess pain using appropriate pain scale  - Administer analgesics based on type and severity of pain and evaluate response  - Implement non-pharmacological measures as appropriate and evaluate response  - Consider cultural and social influences on pain and pain management  - Notify physician/advanced practitioner if interventions unsuccessful or patient reports new pain  Outcome: Progressing     Problem: INFECTION - ADULT  Goal: Absence or prevention of progression during hospitalization  Description: INTERVENTIONS:  - Assess and monitor for signs and symptoms of infection  - Monitor lab/diagnostic results  - Monitor all insertion sites, i e  indwelling lines, tubes, and drains  - Monitor endotracheal if appropriate and nasal secretions for changes in amount and color  - [place] appropriate cooling/warming therapies per order  - Administer medications as ordered  - Instruct and encourage patient and family to use good hand hygiene technique  - Identify and instruct in appropriate isolation precautions for identified infection/condition  Outcome: Progressing  Goal: Absence of fever/infection during neutropenic period  Description: INTERVENTIONS:  - Monitor WBC    Outcome: Progressing     Problem: SAFETY ADULT  Goal: Patient will remain free of falls  Description: INTERVENTIONS:  - Educate patient/family on patient safety including physical limitations  - Instruct patient to call for assistance with activity   - Consult OT/PT to assist with strengthening/mobility   - Keep Call bell within reach  - Keep bed low and locked with side rails adjusted as appropriate  - Keep care items and personal belongings within reach  - Initiate and maintain comfort rounds  - Make Fall Risk Sign visible to staff  - Offer Toileting every 2 Hours, in advance of need  - Initiate/Maintain bed alarm  - Obtain necessary fall risk management equipment:   - Apply yellow socks and bracelet for high fall risk patients  - Consider moving patient to room near nurses station  Outcome: Progressing  Goal: Maintain or return to baseline ADL function  Description: INTERVENTIONS:  -  Assess patient's ability to carry out ADLs; assess patient's baseline for ADL function and identify physical deficits which impact ability to perform ADLs (bathing, care of mouth/teeth, toileting, grooming, dressing, etc )  - Assess/evaluate cause of self-care deficits   - Assess range of motion  - Assess patient's mobility; develop plan if impaired  - Assess patient's need for assistive devices and provide as appropriate  - Encourage maximum independence but intervene and supervise when necessary  - Involve family in performance of ADLs  - Assess for home care needs following discharge   - Consider OT consult to assist with ADL evaluation and planning for discharge  - Provide patient education as appropriate  Outcome: Progressing  Goal: Maintains/Returns to pre admission functional level  Description: INTERVENTIONS:  - Perform BMAT or MOVE assessment daily    - Set and communicate daily mobility goal to care team and patient/family/caregiver  - Collaborate with rehabilitation services on mobility goals if consulted  - Ambulate patient 3 times a day  - Out of bed to chair 3 times a day   - Out of bed for meals 3 times a day  - Out of bed for toileting  - Record patient progress and toleration of activity level   Outcome: Progressing     Problem: DISCHARGE PLANNING  Goal: Discharge to home or other facility with appropriate resources  Description: INTERVENTIONS:  - Identify barriers to discharge w/patient and caregiver  - Arrange for needed discharge resources and transportation as appropriate  - Identify discharge learning needs (meds, wound care, etc )  - Arrange for interpretive services to assist at discharge as needed  - Refer to Case Management Department for coordinating discharge planning if the patient needs post-hospital services based on physician/advanced practitioner order or complex needs related to functional status, cognitive ability, or social support system  Outcome: Progressing     Problem: Knowledge Deficit  Goal: Patient/family/caregiver demonstrates understanding of disease process, treatment plan, medications, and discharge instructions  Description: Complete learning assessment and assess knowledge base  Interventions:  - Provide teaching at level of understanding  - Provide teaching via preferred learning methods  Outcome: Progressing     Problem: Nutrition/Hydration-ADULT  Goal: Nutrient/Hydration intake appropriate for improving, restoring or maintaining nutritional needs  Description: Monitor and assess patient's nutrition/hydration status for malnutrition  Collaborate with interdisciplinary team and initiate plan and interventions as ordered  Monitor patient's weight and dietary intake as ordered or per policy   Utilize nutrition screening tool and intervene as necessary  Determine patient's food preferences and provide high-protein, high-caloric foods as appropriate       INTERVENTIONS:  - Monitor oral intake, urinary output, labs, and treatment plans  - Assess nutrition and hydration status and recommend course of action  - Evaluate amount of meals eaten  - Assist patient with eating if necessary   - Allow adequate time for meals  - Recommend/ encourage appropriate diets, oral nutritional supplements, and vitamin/mineral supplements  - Order, calculate, and assess calorie counts as needed  - Recommend, monitor, and adjust tube feedings and TPN/PPN based on assessed needs  - Assess need for intravenous fluids  - Provide specific nutrition/hydration education as appropriate  - Include patient/family/caregiver in decisions related to nutrition  Outcome: Progressing

## 2022-10-19 NOTE — ASSESSMENT & PLAN NOTE
Continue Florinef/Cortef regimen - can increase to “stress doses” if necessary  Monitor for hypotension -> continue Midodrine  Endocrinology to follow

## 2022-10-19 NOTE — CONSULTS
Consultation - Demetria Tillman 74 y o  male MRN: [id number]    Unit/Bed#: -01 Encounter: [id number]      Assessment/Plan     Assessment: This is a 74y o -year-old male with Type 1 diabetes on longterm insulin (Nph and regular insulin), adrenal insufficiency on hydrocortisone admitted with hypoglycemia      Plan:  # type 1 diabetes on long-term insulin  # hypoglycemia    A1c 7 5 (07/2022)  Home regimen insulin NPH 18 units with breakfast, 2 units at bedtime, regular insulin 10 units with breakfast and dinner  Recommendations: Recommend one time dose of Nph 4 units today, then continue from tomorrow with NPH 8 units daily with breakfast and 2 units daily with dinner with correctional insulin  Monitor Accu-Cheks and adjust insulin regimen as needed  # adrenal insufficiency  History of secondary adrenal insufficiency on hydrocortisone 10 mg b i d (q9AM and q6PM) And fludrocortisone 0 2 mg daily  Continue on medications at home dose  CC: Diabetes Consult    History of Present Illness     HPI: Demetria Tillman is a 74y o  year old male with type 1 diabetes, history of brittle diabetes with multiple hospitalizations for hypoglycemia admitted for hypoglycemia  Endocrinology service consulted for diabetes management  Patient was diagnosed with type 1 diabetes over 30 years ago  He follows with Dr Samara Torres, Endocrinologist at [facility]  Last office visit in 04/2022  Since then he has had multiple admissions and ED presentations for hypoglycemia with multiple adjustments to his insulin regimen  Was previously on Lantus but changed to current home regimen due to cost issues  Was brought in today by EMS following an unresponsive episode at home with reported glucose in the 30s  Received glucagon and IV dextrose with improvement in mentation and glucose  Last dose of insulin was last night  Also history of secondary adrenal insufficiency, on home fludrocortisone and hydrocortisone    Reports compliance on home medications  Inpatient consult to Endocrinology  Consult performed by: Rhiannon Salmon MD  Consult ordered by: Beena Horner MD        ROS  Constitutional: Negative for appetite change  Negative for activity change, fatigue and unexpected weight change  Respiratory: Negative for shortness of breath, wheezing, cough  Cardiovascular: Negative for chest pain and palpitations  Gastrointestinal: Negative for abdominal pain, nausea and vomiting  Negative for diarrhea or constipation  Musculoskeletal: Negative for arthralgias  Neurological: Negative for dizziness, light-headedness and headaches  All other ROS reviewed and negative  Historical Information   Past Medical History:   Diagnosis Date   • Diabetes mellitus ([facility] [address] )      History reviewed  No pertinent surgical history  Social History   Social History     Substance and Sexual Activity   Alcohol Use Not Currently   • Alcohol/week: 5 0 standard drinks   • Types: 5 Cans of beer per week    Comment: Quit in august     Social History     Substance and Sexual Activity   Drug Use Never     Social History     Tobacco Use   Smoking Status Former Smoker   • Years: 3 00   • Types: Cigarettes   Smokeless Tobacco Not on file     Family History: History reviewed  No pertinent family history      Meds/Allergies   Current Facility-Administered Medications   Medication Dose Route Frequency Provider Last Rate Last Admin   • apixaban (ELIQUIS) tablet 5 mg  5 mg Oral BID Beena Horner MD       • [START ON 10/20/2022] aspirin chewable tablet 81 mg  81 mg Oral Daily Beena Horner MD       • [START ON 10/20/2022] atorvastatin (LIPITOR) tablet 10 mg  10 mg Oral Daily With Cliff Aceves MD       • fludrocortisone (FLORINEF) tablet 0 2 mg  0 2 mg Oral Daily Beena Horner MD       • hydrocortisone (CORTEF) tablet 10 mg  10 mg Oral BID Beena Horner MD       • insulin lispro (HumaLOG) 100 units/mL subcutaneous injection 1-6 Units  1-6 Units Subcutaneous 4x Daily (AC & HS) Rodo Sweet MD       • magnesium sulfate 2 g/50 mL IVPB (premix) 2 g  2 g Intravenous Once Rodo Sweet MD       • metoprolol tartrate (LOPRESSOR) partial tablet 12 5 mg  12 5 mg Oral BID Rodo Sweet MD       • midodrine (PROAMATINE) tablet 10 mg  10 mg Oral TID after meals Rodo Sweet MD       • potassium chloride (K-DUR,KLOR-CON) CR tablet 20 mEq  20 mEq Oral BID Rodo Sweet MD       • risperiDONE (RisperDAL) tablet 0 5 mg  0 5 mg Oral BID Rodo Sweet MD       • sertraline (ZOLOFT) tablet 100 mg  100 mg Oral Daily Rodo Sweet MD         Allergies   Allergen Reactions   • Imipramine Other (See Comments)   • Lisinopril Other (See Comments)   • Paroxetine Other (See Comments)   • Penicillins Hives   • Rosiglitazone Other (See Comments)   • Troglitazone Other (See Comments)       Objective   Vitals: Blood pressure 154/78, pulse 76, temperature 98 8 °F (37 1 °C), temperature source Oral, resp  rate 18, height 5' 4" (1 626 m), weight 89 8 kg (198 lb), SpO2 98 %  No intake or output data in the 24 hours ending 10/19/22 1233  Invasive Devices  Report    None                 Physical Exam   GEN:  Awake and alert, not in acute distress  HEENT: MM moist  No scleral icterus  CV: RRR, nml S1/S2  Lungs: CTA bilaterally  Abd: Soft, NT, ND  Neuro:  Mild cognitive impairment  Skin: No rashes or lesions noted      The history was obtained from the review of the chart, patient  Lab Results:       Lab Results   Component Value Date    WBC 11 82 (H) 10/19/2022    HGB 9 2 (L) 10/19/2022    HCT 28 7 (L) 10/19/2022    MCV 99 (H) 10/19/2022     10/19/2022     Lab Results   Component Value Date/Time    BUN 8 10/19/2022 07:33 AM    K 3 3 (L) 10/19/2022 07:33 AM     (H) 10/19/2022 07:33 AM    CO2 27 10/19/2022 07:33 AM    CREATININE 0 99 10/19/2022 07:33 AM     No results for input(s): CHOL, HDL, LDL, TRIG, VLDL in the last 72 hours    No results found for: [place]  POC Glucose (mg/dl)   Date Value   10/19/2022 199 (H)   10/19/2022 196 (H)   10/19/2022 402 (H)       Imaging Studies: I have personally reviewed pertinent reports  Portions of the record may have been created with voice recognition software

## 2022-10-19 NOTE — ED NOTES
Patients RN notified patient arrived to the floor and accucheck was taken prior to arrival to the floor but he gets hypoglycemic quickly        Kike aWrren  10/19/22 2469

## 2022-10-19 NOTE — ED PROVIDER NOTES
History  Chief Complaint   Patient presents with   • Hypoglycemia - Symptomatic     Pt recently discharged from [facility]  Self checked bg at home was 35  Pt arrives via ems aaox4 gcs 15     HPI  56-year-old male with a past medical history of uncontrolled type 1 diabetes, adrenal insufficiency, hypertension, CHF, AFib on Eliquis who presents to the emergency department for hypoglycemia  Patient's companion called EMS when she saw him unresponsive in a chair this morning  Glucose was supported 35 by family member patient was given glucagon by daughter and glucose improved  Patient mental status also improved is GCS of 15  Patient denies any chest pain, shortness of breath, lateralizing weakness, nausea, vomiting, abdominal pain, fever, chills, cough  Patient does not know why he accused becoming hypoglycemic  This is the patient's 3rd emergency department visit in 7 days for the same complaint  Prior to Admission Medications   Prescriptions Last Dose Informant Patient Reported? Taking?    NON FORMULARY   Yes Yes   Sig: Take 30 mcg by mouth daily at bedtime Rayaldee 30 mcg @ bedtime   NON FORMULARY   Yes Yes   Si Bottle if needed Relion Glucose   apixaban (ELIQUIS) 5 mg 10/18/2022 at Unknown time  Yes Yes   Sig: Take 5 mg by mouth 2 (two) times a day   aspirin 81 mg chewable tablet 10/18/2022 at Unknown time  Yes Yes   Sig: Chew 81 mg daily   atorvastatin (LIPITOR) 10 mg tablet 10/18/2022 at Unknown time  Yes Yes   Sig: Take 1 tablet by mouth daily   fludrocortisone (FLORINEF) 0 1 mg tablet 10/18/2022 at Unknown time  Yes Yes   Sig: Take 0 2 mg by mouth daily   hydrocortisone (CORTEF) 10 mg tablet 10/18/2022 at Unknown time  Yes Yes   Sig: Take 10 mg by mouth 2 (two) times a day   insulin NPH (HumuLIN N,NovoLIN N) 100 Units/mL subcutaneous injection 10/18/2022 at Unknown time  Yes Yes   Sig: Inject 18 Units under the skin daily with breakfast   insulin regular (HumuLIN R) 100 units/mL injection 10/18/2022 at Unknown time  Yes Yes   Sig: Inject 6 Units under the skin 3 (three) times a day before meals   metoprolol tartrate (LOPRESSOR) 25 mg tablet 10/18/2022 at Unknown time  Yes Yes   Sig: Take 12 5 mg by mouth 2 (two) times a day   midodrine (PROAMATINE) 10 MG tablet 10/18/2022 at Unknown time  Yes Yes   Sig: Take 10 mg by mouth 3 (three) times daily after meals   potassium chloride (Klor-Con) 10 mEq tablet 10/18/2022 at Unknown time  Yes Yes   Sig: Take 20 mEq by mouth 2 (two) times a day   risperiDONE (RisperDAL) 0 5 mg tablet 10/18/2022 at Unknown time  Yes Yes   Sig: Take 1 tablet by mouth 2 (two) times a day   sertraline (ZOLOFT) 100 mg tablet 10/18/2022 at Unknown time  Yes Yes   Sig: Take 100 mg by mouth daily      Facility-Administered Medications: None       Past Medical History:   Diagnosis Date   • Diabetes mellitus ([facility] [address] )        History reviewed  No pertinent surgical history  History reviewed  No pertinent family history  I have reviewed and agree with the history as documented  E-Cigarette/Vaping     E-Cigarette/Vaping Substances     Social History     Tobacco Use   • Smoking status: Former Smoker     Years: 3 00     Types: Cigarettes   Substance Use Topics   • Alcohol use: Not Currently     Alcohol/week: 5 0 standard drinks     Types: 5 Cans of beer per week     Comment: Quit in august   • Drug use: Never        Review of Systems   Constitutional: Negative for chills and fever  HENT: Negative for congestion, ear pain, rhinorrhea and sore throat  Eyes: Negative for pain  Respiratory: Negative for apnea, cough, choking, chest tightness, shortness of breath, wheezing and stridor  Cardiovascular: Negative for chest pain, palpitations and leg swelling  Gastrointestinal: Negative for abdominal pain, constipation, diarrhea, nausea and vomiting  Genitourinary: Negative for hematuria  Musculoskeletal: Negative for arthralgias and back pain  Skin: Negative for rash and wound  Neurological: Positive for headaches  Negative for dizziness  Psychiatric/Behavioral: Negative for agitation and hallucinations  All other systems reviewed and are negative  Physical Exam  ED Triage Vitals   Temperature Pulse Respirations Blood Pressure SpO2   10/19/22 0635 10/19/22 0635 10/19/22 0635 10/19/22 0635 10/19/22 0635   98 5 °F (36 9 °C) 83 18 136/64 98 %      Temp Source Heart Rate Source Patient Position - Orthostatic VS BP Location FiO2 (%)   10/19/22 0635 10/19/22 0635 10/19/22 0730 10/19/22 0730 10/20/22 0300   Oral Monitor Lying Right arm 60      Pain Score       10/19/22 0635       No Pain             Orthostatic Vital Signs  Vitals:    10/25/22 1706 10/26/22 0014 10/26/22 0759 10/26/22 1547   BP: 132/74 162/87 168/92 138/70   Pulse: 64 79 79 63   Patient Position - Orthostatic VS:           Physical Exam  Vitals reviewed  Constitutional:       General: He is not in acute distress  Appearance: He is well-developed  HENT:      Head: Normocephalic and atraumatic  Right Ear: External ear normal       Left Ear: External ear normal       Nose: Nose normal  No congestion or rhinorrhea  Mouth/Throat:      Mouth: Mucous membranes are moist       Pharynx: No oropharyngeal exudate or posterior oropharyngeal erythema  Eyes:      General:         Right eye: No discharge  Left eye: No discharge  Extraocular Movements: Extraocular movements intact  Conjunctiva/sclera: Conjunctivae normal       Pupils: Pupils are equal, round, and reactive to light  Cardiovascular:      Rate and Rhythm: Normal rate and regular rhythm  Pulses: Normal pulses  Heart sounds: Normal heart sounds  Pulmonary:      Effort: Pulmonary effort is normal  No respiratory distress  Breath sounds: Normal breath sounds  No wheezing or rales  Abdominal:      Palpations: Abdomen is soft  Tenderness: There is no abdominal tenderness     Musculoskeletal:         General: No tenderness  Normal range of motion  Cervical back: Normal range of motion and neck supple  No rigidity  Skin:     General: Skin is warm  Findings: No erythema or rash  Neurological:      General: No focal deficit present  Mental Status: He is alert and oriented to person, place, and time  Cranial Nerves: No cranial nerve deficit  Sensory: No sensory deficit  Motor: No weakness        Coordination: Coordination normal    Psychiatric:         Mood and Affect: Mood normal          ED Medications  Medications   dextrose 50 % IV solution **ADS Override Pull** (  Canceled Entry 10/20/22 0013)   sodium chloride 0 9 % bolus 1,000 mL (0 mL Intravenous Stopped 10/19/22 0930)   magnesium sulfate 2 g/50 mL IVPB (premix) 2 g (0 g Intravenous Stopped 10/19/22 1811)   insulin NPH (HumuLIN N,NovoLIN N) 100 Units/mL subcutaneous injection 4 Units (4 Units Subcutaneous Given 10/19/22 1617)   dextrose 25 g/50 mL IV solution (25 g Intravenous Given 10/19/22 2217)   etomidate (AMIDATE) 2 mg/mL injection (20 mg Intravenous Given 10/19/22 2217)   Succinylcholine Chloride 100 mg/5 mL syringe (100 mg Intravenous Given 10/19/22 2217)   fentanyl citrate (PF) 100 MCG/2ML 100 mcg (100 mcg Intravenous Given 10/19/22 2345)   dextrose 50 % IV solution 50 mL (50 mL Intravenous Given 10/20/22 0136)   potassium chloride oral solution 40 mEq (40 mEq Oral Given 10/20/22 0203)   calcium gluconate 1 g in sodium chloride 0 9% 50 mL (premix) (0 g Intravenous Stopped 10/20/22 0258)   potassium chloride oral solution 20 mEq (20 mEq Oral Given 10/25/22 1831)       Diagnostic Studies  Results Reviewed     Procedure Component Value Units Date/Time    Fingerstick Glucose (POCT) [[id number]]  (Abnormal) Collected: 10/19/22 1059    Lab Status: Final result Updated: 10/19/22 1100     POC Glucose 196 mg/dl     Basic metabolic panel [[id number]]  (Abnormal) Collected: 10/19/22 0733    Lab Status: Final result Specimen: Blood from Arm, Right Updated: 10/19/22 0754     Sodium 144 mmol/L      Potassium 3 3 mmol/L      Chloride 111 mmol/L      CO2 27 mmol/L      ANION GAP 6 mmol/L      BUN 8 mg/dL      Creatinine 0 99 mg/dL      Glucose 155 mg/dL      Calcium 8 4 mg/dL      eGFR 79 ml/min/1 73sq m     Narrative:      [place] guidelines for Chronic Kidney Disease (CKD):   •  Stage 1 with normal or high GFR (GFR > 90 mL/min/1 73 square meters)  •  Stage 2 Mild CKD (GFR = 60-89 mL/min/1 73 square meters)  •  Stage 3A Moderate CKD (GFR = 45-59 mL/min/1 73 square meters)  •  Stage 3B Moderate CKD (GFR = 30-44 mL/min/1 73 square meters)  •  Stage 4 Severe CKD (GFR = 15-29 mL/min/1 73 square meters)  •  Stage 5 End Stage CKD (GFR <15 mL/min/1 73 square meters)  Note: GFR calculation is accurate only with a steady state creatinine    CBC and differential [[id number]]  (Abnormal) Collected: 10/19/22 0733    Lab Status: Final result Specimen: Blood from Arm, Right Updated: 10/19/22 0745     WBC 11 82 Thousand/uL      RBC 2 90 Million/uL      Hemoglobin 9 2 g/dL      Hematocrit 28 7 %      MCV 99 fL      MCH 31 7 pg      MCHC 32 1 g/dL      RDW 13 3 %      MPV 10 8 fL      Platelets 936 Thousands/uL      nRBC 0 /100 WBCs      Neutrophils Relative 67 %      Immat GRANS % 1 %      Lymphocytes Relative 19 %      Monocytes Relative 11 %      Eosinophils Relative 2 %      Basophils Relative 0 %      Neutrophils Absolute 7 95 Thousands/µL      Immature Grans Absolute 0 08 Thousand/uL      Lymphocytes Absolute 2 24 Thousands/µL      Monocytes Absolute 1 27 Thousand/µL      Eosinophils Absolute 0 26 Thousand/µL      Basophils Absolute 0 02 Thousands/µL     Fingerstick Glucose (POCT) [[id number]]  (Abnormal) Collected: 10/19/22 0636    Lab Status: Final result Updated: 10/19/22 0637     POC Glucose 402 mg/dl                  XR abdomen 1 view kub   Final Result by Phyllis Chavira MD (10/20 1042)      Nasogastric tube is in the stomach  Workstation performed: [id number]         XR chest portable ICU   Final Result by Theresa Miranda MD (10/20 1040)      Interstitial pulmonary edema  Endotracheal tube inserted into the trachea, with tip 1 9 cm above the beni  Nasogastric tube is in the stomach  Workstation performed: [id number]         CT head wo contrast   Final Result by Martha Vick MD (10/19 2308)      No acute intracranial abnormality  No interval change compared to prior earlier study dated same day  Workstation performed: [id number]         CT head without contrast   Final Result by Thomas Roberson MD (10/19 0915)      1  No acute intracranial CT abnormality  2   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 0 Filed at: 10/19/2022 0741   In the last 24 hours, have you needed more help than usual? 1 Filed at: 10/19/2022 8671   Have you been hospitalized for one or more nights during the past 6 months? 1 Filed at: 10/19/2022 0741   In general, do you see well? 0 Filed at: 10/19/2022 0741   In general, do you have serious problems with your memory? 0 Filed at: 10/19/2022 0741   Do you take more than three different medications every day?  1 Filed at: 10/19/2022 0741   ISAR Score 3 Filed at: 10/19/2022 0741                    SBIRT 22yo+    Flowsheet Row Most Recent Value   SBIRT (25 yo +)    In order to provide better care to our patients, we are screening all of our patients for alcohol and drug use  Would it be okay to ask you these screening questions? No Filed at: 10/19/2022 0640                MDM  Number of Diagnoses or Management Options  77-year-old male with a past medical history of uncontrolled type 1 diabetes, adrenal insufficiency, hypertension, CHF, AFib on Eliquis who presents to the emergency department for hypoglycemia  Hypoglycemia    Patient is unsure of his own insulin regimen hypoglycemic today  Patient's daughter normally administers his insulin however she has been out of town until today  Patient is admitted to Medicine for further management of his diabetes and insulin regimen      Disposition  Final diagnoses:   Hypoglycemia     Time reflects when diagnosis was documented in both MDM as applicable and the Disposition within this note     Time User Action Codes Description Comment    10/19/2022 10:20 AM Mackenzie Caruso Add [E16 2] Hypoglycemia     10/21/2022 10:09 AM Westley Pizarro Add [E10 9] Insulin dependent type 1 diabetes mellitus      10/26/2022  3:53 PM Sol Dean Add [I10] Essential hypertension       ED Disposition     ED Disposition   Admit    Condition   Stable    Date/Time   Wed Oct 19, 2022  7:13 AM    Comment              Follow-up Information     Follow up With Specialties Details Why [address] [facility]  Follow up  [address] [facility] [address] [facility]  Jeny Coleman MD Family Medicine Schedule an appointment as soon as possible for a visit in 1 week(s)  [address] [address]  [phone]      Neda Shirley MD Endocrinology Schedule an appointment as soon as possible for a visit in 1 week(s)  [address] [facility] [address]  [phone]            Discharge Medication List as of 10/26/2022  5:33 PM      START taking these medications    Details   Insulin Glargine Solostar (Lantus SoloStar) 100 UNIT/ML SOPN Inject 0 1 mL (10 Units total) under the skin daily at bedtime, Starting Wed 10/26/2022, Until Fri 11/25/2022, Normal      insulin lispro (HumaLOG KwikPen) 100 units/mL injection pen Inject 6 Units under the skin 3 (three) times a day with meals, Starting Wed 10/26/2022, Normal      !! Insulin Pen Needle (BD Pen Needle Blessing 2nd Gen) 32G X 4 MM MISC For use with insulin pen  Pharmacy may dispense brand covered by insurance , Normal      !! Insulin Pen Needle (BD Pen Needle Blessing 2nd Gen) 32G X 4 MM MISC For use with insulin pen  Pharmacy may dispense brand covered by insurance , Normal       !! - Potential duplicate medications found  Please discuss with provider  CONTINUE these medications which have CHANGED    Details   midodrine (PROAMATINE) 5 mg tablet Take 1 tablet (5 mg total) by mouth 3 (three) times a day as needed (sbp < 110   Otherwise hold the medication), Starting Wed 10/26/2022, Until Fri 11/25/2022 at 2359, Normal         CONTINUE these medications which have NOT CHANGED    Details   apixaban (ELIQUIS) 5 mg Take 5 mg by mouth 2 (two) times a day, Starting Thu 10/13/2022, Historical Med      aspirin 81 mg chewable tablet Chew 81 mg daily, Historical Med      atorvastatin (LIPITOR) 10 mg tablet Take 1 tablet by mouth daily, Starting Tue 7/12/2022, Historical Med      fludrocortisone (FLORINEF) 0 1 mg tablet Take 0 2 mg by mouth daily, Starting Mon 9/26/2022, Until Tue 9/26/2023, Historical Med      hydrocortisone (CORTEF) 10 mg tablet Take 10 mg by mouth 2 (two) times a day, Starting Wed 8/17/2022, Historical Med      metoprolol tartrate (LOPRESSOR) 25 mg tablet Take 12 5 mg by mouth 2 (two) times a day, Starting Thu 9/8/2022, Historical Med      !! NON FORMULARY Take 30 mcg by mouth daily at bedtime Rayaldee 30 mcg @ bedtime, Historical Med      !! NON FORMULARY 1 Bottle if needed Relion Glucose, Historical Med      potassium chloride (Klor-Con) 10 mEq tablet Take 20 mEq by mouth 2 (two) times a day, Starting Thu 10/13/2022, Until Fri 10/13/2023, Historical Med      risperiDONE (RisperDAL) 0 5 mg tablet Take 1 tablet by mouth 2 (two) times a day, Starting Mon 7/11/2022, Historical Med      sertraline (ZOLOFT) 100 mg tablet Take 100 mg by mouth daily, Starting Wed 3/16/2022, Until Thu 3/16/2023, Historical Med       !! - Potential duplicate medications found  Please discuss with provider  STOP taking these medications       insulin aspart (NovoLOG FlexPen) 100 UNIT/ML injection pen Comments:   Reason for Stopping:         insulin NPH (HumuLIN N,NovoLIN N) 100 Units/mL subcutaneous injection Comments:   Reason for Stopping:         insulin regular (HumuLIN R) 100 units/mL injection Comments:   Reason for Stopping:             Outpatient Discharge Orders   EXTERNAL: Ambulatory Referral to Home Health   Standing Status: Future Standing Exp  Date: 10/26/23      Discharge Diet     Activity as tolerated     Call provider for:  persistent nausea or vomiting     Call provider for:  difficulty breathing, headache or visual disturbances     Call provider for:  persistent dizziness or light-headedness       PDMP Review     None           ED Provider  Attending physically available and evaluated Robyn De Los Santos I managed the patient along with the ED Attending      Electronically Signed by         Lubna Matt DO  11/02/22 6985

## 2022-10-20 ENCOUNTER — APPOINTMENT (INPATIENT)
Dept: RADIOLOGY | Facility: HOSPITAL | Age: 67
DRG: 637 | End: 2022-10-20
Payer: COMMERCIAL

## 2022-10-20 LAB
ALBUMIN SERPL BCP-MCNC: 2.7 G/DL (ref 3.5–5)
ALP SERPL-CCNC: 64 U/L (ref 46–116)
ALT SERPL W P-5'-P-CCNC: 15 U/L (ref 12–78)
ANION GAP SERPL CALCULATED.3IONS-SCNC: 1 MMOL/L (ref 4–13)
ANION GAP SERPL CALCULATED.3IONS-SCNC: 3 MMOL/L (ref 4–13)
ANION GAP SERPL CALCULATED.3IONS-SCNC: 4 MMOL/L (ref 4–13)
AST SERPL W P-5'-P-CCNC: 7 U/L (ref 5–45)
ATRIAL RATE: 60 BPM
BASOPHILS # BLD AUTO: 0.03 THOUSANDS/ÂΜL (ref 0–0.1)
BASOPHILS NFR BLD AUTO: 0 % (ref 0–1)
BILIRUB SERPL-MCNC: 0.45 MG/DL (ref 0.2–1)
BUN SERPL-MCNC: 9 MG/DL (ref 5–25)
CALCIUM ALBUM COR SERPL-MCNC: 9.1 MG/DL (ref 8.3–10.1)
CALCIUM SERPL-MCNC: 8.1 MG/DL (ref 8.3–10.1)
CALCIUM SERPL-MCNC: 8.5 MG/DL (ref 8.3–10.1)
CALCIUM SERPL-MCNC: 8.5 MG/DL (ref 8.3–10.1)
CHLORIDE SERPL-SCNC: 109 MMOL/L (ref 96–108)
CHLORIDE SERPL-SCNC: 111 MMOL/L (ref 96–108)
CHLORIDE SERPL-SCNC: 111 MMOL/L (ref 96–108)
CO2 SERPL-SCNC: 25 MMOL/L (ref 21–32)
CO2 SERPL-SCNC: 29 MMOL/L (ref 21–32)
CO2 SERPL-SCNC: 29 MMOL/L (ref 21–32)
CREAT SERPL-MCNC: 1.05 MG/DL (ref 0.6–1.3)
CREAT SERPL-MCNC: 1.1 MG/DL (ref 0.6–1.3)
CREAT SERPL-MCNC: 1.11 MG/DL (ref 0.6–1.3)
EOSINOPHIL # BLD AUTO: 0.21 THOUSAND/ÂΜL (ref 0–0.61)
EOSINOPHIL NFR BLD AUTO: 3 % (ref 0–6)
ERYTHROCYTE [DISTWIDTH] IN BLOOD BY AUTOMATED COUNT: 13.2 % (ref 11.6–15.1)
GFR SERPL CREATININE-BSD FRML MDRD: 69 ML/MIN/1.73SQ M
GFR SERPL CREATININE-BSD FRML MDRD: 70 ML/MIN/1.73SQ M
GFR SERPL CREATININE-BSD FRML MDRD: 74 ML/MIN/1.73SQ M
GLUCOSE SERPL-MCNC: 100 MG/DL (ref 65–140)
GLUCOSE SERPL-MCNC: 101 MG/DL (ref 65–140)
GLUCOSE SERPL-MCNC: 102 MG/DL (ref 65–140)
GLUCOSE SERPL-MCNC: 113 MG/DL (ref 65–140)
GLUCOSE SERPL-MCNC: 116 MG/DL (ref 65–140)
GLUCOSE SERPL-MCNC: 124 MG/DL (ref 65–140)
GLUCOSE SERPL-MCNC: 136 MG/DL (ref 65–140)
GLUCOSE SERPL-MCNC: 153 MG/DL (ref 65–140)
GLUCOSE SERPL-MCNC: 155 MG/DL (ref 65–140)
GLUCOSE SERPL-MCNC: 158 MG/DL (ref 65–140)
GLUCOSE SERPL-MCNC: 231 MG/DL (ref 65–140)
GLUCOSE SERPL-MCNC: 27 MG/DL (ref 65–140)
GLUCOSE SERPL-MCNC: 97 MG/DL (ref 65–140)
HCT VFR BLD AUTO: 26 % (ref 36.5–49.3)
HGB BLD-MCNC: 8.4 G/DL (ref 12–17)
IMM GRANULOCYTES # BLD AUTO: 0.03 THOUSAND/UL (ref 0–0.2)
IMM GRANULOCYTES NFR BLD AUTO: 0 % (ref 0–2)
INSULIN SERPL-ACNC: 5.7 MU/L (ref 3–25)
LYMPHOCYTES # BLD AUTO: 1.47 THOUSANDS/ÂΜL (ref 0.6–4.47)
LYMPHOCYTES NFR BLD AUTO: 20 % (ref 14–44)
MCH RBC QN AUTO: 32.1 PG (ref 26.8–34.3)
MCHC RBC AUTO-ENTMCNC: 32.3 G/DL (ref 31.4–37.4)
MCV RBC AUTO: 99 FL (ref 82–98)
MONOCYTES # BLD AUTO: 1.08 THOUSAND/ÂΜL (ref 0.17–1.22)
MONOCYTES NFR BLD AUTO: 15 % (ref 4–12)
NEUTROPHILS # BLD AUTO: 4.42 THOUSANDS/ÂΜL (ref 1.85–7.62)
NEUTS SEG NFR BLD AUTO: 62 % (ref 43–75)
NRBC BLD AUTO-RTO: 0 /100 WBCS
P AXIS: 18 DEGREES
PLATELET # BLD AUTO: 181 THOUSANDS/UL (ref 149–390)
PMV BLD AUTO: 11.3 FL (ref 8.9–12.7)
POTASSIUM SERPL-SCNC: 3.7 MMOL/L (ref 3.5–5.3)
POTASSIUM SERPL-SCNC: 4.1 MMOL/L (ref 3.5–5.3)
POTASSIUM SERPL-SCNC: 4.3 MMOL/L (ref 3.5–5.3)
PR INTERVAL: 183 MS
PROT SERPL-MCNC: 5.6 G/DL (ref 6.4–8.4)
QRS AXIS: 56 DEGREES
QRSD INTERVAL: 63 MS
QT INTERVAL: 433 MS
QTC INTERVAL: 433 MS
RBC # BLD AUTO: 2.62 MILLION/UL (ref 3.88–5.62)
RETICS # AUTO: NORMAL 10*3/UL (ref 14356–105094)
RETICS # CALC: 1.7 % (ref 0.37–1.87)
SODIUM SERPL-SCNC: 140 MMOL/L (ref 135–147)
SODIUM SERPL-SCNC: 141 MMOL/L (ref 135–147)
SODIUM SERPL-SCNC: 141 MMOL/L (ref 135–147)
T WAVE AXIS: 21 DEGREES
VENTRICULAR RATE: 60 BPM
WBC # BLD AUTO: 7.24 THOUSAND/UL (ref 4.31–10.16)

## 2022-10-20 PROCEDURE — 94760 N-INVAS EAR/PLS OXIMETRY 1: CPT

## 2022-10-20 PROCEDURE — NC001 PR NO CHARGE: Performed by: INTERNAL MEDICINE

## 2022-10-20 PROCEDURE — 82948 REAGENT STRIP/BLOOD GLUCOSE: CPT

## 2022-10-20 PROCEDURE — 85045 AUTOMATED RETICULOCYTE COUNT: CPT | Performed by: STUDENT IN AN ORGANIZED HEALTH CARE EDUCATION/TRAINING PROGRAM

## 2022-10-20 PROCEDURE — 84206 ASSAY OF PROINSULIN: CPT | Performed by: INTERNAL MEDICINE

## 2022-10-20 PROCEDURE — 94003 VENT MGMT INPAT SUBQ DAY: CPT

## 2022-10-20 PROCEDURE — 93010 ELECTROCARDIOGRAM REPORT: CPT | Performed by: INTERNAL MEDICINE

## 2022-10-20 PROCEDURE — 71045 X-RAY EXAM CHEST 1 VIEW: CPT

## 2022-10-20 PROCEDURE — 99291 CRITICAL CARE FIRST HOUR: CPT | Performed by: INTERNAL MEDICINE

## 2022-10-20 PROCEDURE — 80048 BASIC METABOLIC PNL TOTAL CA: CPT | Performed by: INTERNAL MEDICINE

## 2022-10-20 PROCEDURE — 83525 ASSAY OF INSULIN: CPT | Performed by: INTERNAL MEDICINE

## 2022-10-20 PROCEDURE — 80048 BASIC METABOLIC PNL TOTAL CA: CPT | Performed by: STUDENT IN AN ORGANIZED HEALTH CARE EDUCATION/TRAINING PROGRAM

## 2022-10-20 PROCEDURE — 84681 ASSAY OF C-PEPTIDE: CPT | Performed by: INTERNAL MEDICINE

## 2022-10-20 PROCEDURE — 99233 SBSQ HOSP IP/OBS HIGH 50: CPT | Performed by: INTERNAL MEDICINE

## 2022-10-20 PROCEDURE — 74018 RADEX ABDOMEN 1 VIEW: CPT

## 2022-10-20 PROCEDURE — 85025 COMPLETE CBC W/AUTO DIFF WBC: CPT | Performed by: INTERNAL MEDICINE

## 2022-10-20 PROCEDURE — 80053 COMPREHEN METABOLIC PANEL: CPT | Performed by: NURSE PRACTITIONER

## 2022-10-20 RX ORDER — ACETAMINOPHEN 325 MG/1
650 TABLET ORAL EVERY 6 HOURS PRN
Status: DISCONTINUED | OUTPATIENT
Start: 2022-10-20 | End: 2022-10-26 | Stop reason: HOSPADM

## 2022-10-20 RX ORDER — POTASSIUM CHLORIDE 20MEQ/15ML
40 LIQUID (ML) ORAL ONCE
Status: COMPLETED | OUTPATIENT
Start: 2022-10-20 | End: 2022-10-20

## 2022-10-20 RX ORDER — DEXTROSE MONOHYDRATE 25 G/50ML
50 INJECTION, SOLUTION INTRAVENOUS ONCE
Status: COMPLETED | OUTPATIENT
Start: 2022-10-20 | End: 2022-10-20

## 2022-10-20 RX ORDER — DEXTROSE AND SODIUM CHLORIDE 5; .45 G/100ML; G/100ML
50 INJECTION, SOLUTION INTRAVENOUS CONTINUOUS
Status: DISCONTINUED | OUTPATIENT
Start: 2022-10-20 | End: 2022-10-20

## 2022-10-20 RX ORDER — HYDROCORTISONE 10 MG/1
10 TABLET ORAL 2 TIMES DAILY
Status: DISCONTINUED | OUTPATIENT
Start: 2022-10-20 | End: 2022-10-26 | Stop reason: HOSPADM

## 2022-10-20 RX ORDER — CALCIUM GLUCONATE 20 MG/ML
1 INJECTION, SOLUTION INTRAVENOUS ONCE
Status: COMPLETED | OUTPATIENT
Start: 2022-10-20 | End: 2022-10-20

## 2022-10-20 RX ORDER — INSULIN LISPRO 100 [IU]/ML
1-5 INJECTION, SOLUTION INTRAVENOUS; SUBCUTANEOUS
Status: DISCONTINUED | OUTPATIENT
Start: 2022-10-20 | End: 2022-10-22

## 2022-10-20 RX ORDER — INSULIN GLARGINE 100 [IU]/ML
2 INJECTION, SOLUTION SUBCUTANEOUS EVERY 24 HOURS
Status: DISCONTINUED | OUTPATIENT
Start: 2022-10-20 | End: 2022-10-22

## 2022-10-20 RX ORDER — DEXTROSE MONOHYDRATE 25 G/50ML
INJECTION, SOLUTION INTRAVENOUS
Status: COMPLETED
Start: 2022-10-20 | End: 2022-10-20

## 2022-10-20 RX ADMIN — CALCIUM GLUCONATE 1 G: 20 INJECTION, SOLUTION INTRAVENOUS at 02:02

## 2022-10-20 RX ADMIN — RISPERIDONE 0.5 MG: 0.25 TABLET ORAL at 08:08

## 2022-10-20 RX ADMIN — SERTRALINE 100 MG: 100 TABLET, FILM COATED ORAL at 08:08

## 2022-10-20 RX ADMIN — APIXABAN 5 MG: 5 TABLET, FILM COATED ORAL at 17:24

## 2022-10-20 RX ADMIN — APIXABAN 5 MG: 5 TABLET, FILM COATED ORAL at 08:07

## 2022-10-20 RX ADMIN — Medication 12.5 MG: at 17:24

## 2022-10-20 RX ADMIN — DEXTROSE AND SODIUM CHLORIDE 50 ML/HR: 5; .45 INJECTION, SOLUTION INTRAVENOUS at 02:02

## 2022-10-20 RX ADMIN — ASPIRIN 81 MG CHEWABLE TABLET 81 MG: 81 TABLET CHEWABLE at 08:07

## 2022-10-20 RX ADMIN — CHLORHEXIDINE GLUCONATE 15 ML: 1.2 SOLUTION ORAL at 02:02

## 2022-10-20 RX ADMIN — INSULIN GLARGINE 2 UNITS: 100 INJECTION, SOLUTION SUBCUTANEOUS at 17:24

## 2022-10-20 RX ADMIN — CHLORHEXIDINE GLUCONATE 15 ML: 1.2 SOLUTION ORAL at 21:17

## 2022-10-20 RX ADMIN — ATORVASTATIN CALCIUM 10 MG: 10 TABLET, FILM COATED ORAL at 17:24

## 2022-10-20 RX ADMIN — PROPOFOL 15 MCG/KG/MIN: 10 INJECTION, EMULSION INTRAVENOUS at 03:27

## 2022-10-20 RX ADMIN — FLUDROCORTISONE ACETATE 0.2 MG: 0.1 TABLET ORAL at 08:07

## 2022-10-20 RX ADMIN — DEXTROSE MONOHYDRATE 50 ML: 25 INJECTION, SOLUTION INTRAVENOUS at 01:36

## 2022-10-20 RX ADMIN — HYDROCORTISONE 10 MG: 10 TABLET ORAL at 08:07

## 2022-10-20 RX ADMIN — MIDODRINE HYDROCHLORIDE 10 MG: 5 TABLET ORAL at 12:12

## 2022-10-20 RX ADMIN — CHLORHEXIDINE GLUCONATE 15 ML: 1.2 SOLUTION ORAL at 08:07

## 2022-10-20 RX ADMIN — Medication 40 MEQ: at 02:03

## 2022-10-20 RX ADMIN — RISPERIDONE 0.5 MG: 0.25 TABLET ORAL at 17:28

## 2022-10-20 RX ADMIN — ACETAMINOPHEN 650 MG: 325 TABLET ORAL at 19:20

## 2022-10-20 RX ADMIN — HYDROCORTISONE 10 MG: 10 TABLET ORAL at 17:28

## 2022-10-20 RX ADMIN — Medication 12.5 MG: at 08:06

## 2022-10-20 RX ADMIN — MIDODRINE HYDROCHLORIDE 10 MG: 5 TABLET ORAL at 08:06

## 2022-10-20 RX ADMIN — MIDODRINE HYDROCHLORIDE 10 MG: 5 TABLET ORAL at 17:24

## 2022-10-20 NOTE — PROGRESS NOTES
Repeat BS on different Glucometer was 397  Plan to draw CMP  Pt awake, but doesn't converse much  Seems to be annoyed and giving one word answers

## 2022-10-20 NOTE — PROGRESS NOTES
Responded to rapid response  Medical team with the patient  No family present at this time  Patient taken to the CT Scan for further exam and then going to move to MICU  Support to staff     10/19/22 0894   Clinical Encounter Type   Visited With Health care provider;Patient not available   Crisis Visit   (Rapid response)

## 2022-10-20 NOTE — RESPIRATORY THERAPY NOTE
RT Ventilator Management Note  Reno Leigh 74 y o  male MRN: [id number]  [facility] [id number]      Daily Screen         10/20/2022  0810             Patient safety screen outcome[de-identified] Passed    Spont breathing trial % for 30 min: --              Physical Exam:   Assessment Type: Assess only  General Appearance: Sedated  Respiratory Pattern: Assisted  Chest Assessment: Chest expansion symmetrical  Bilateral Breath Sounds: Diminished  O2 Device: vent      Resp Comments: (P) pt placed on sbt at this time

## 2022-10-20 NOTE — UTILIZATION REVIEW
Initial Clinical Review    Admission: Date/Time/Statement:   Admission Orders (From admission, onward)     Ordered        10/19/22 1021  INPATIENT ADMISSION  Once                      Orders Placed This Encounter   Procedures   • INPATIENT ADMISSION     Standing Status:   Standing     Number of Occurrences:   1     Order Specific Question:   Level of Care     Answer:   Med Surg [16]     Order Specific Question:   Estimated length of stay     Answer:   More than 2 Midnights     Order Specific Question:   Certification     Answer:   I certify that inpatient services are medically necessary for this patient for a duration of greater than two midnights  See H&P and MD Progress Notes for additional information about the patient's course of treatment  ED Arrival Information     Expected   -    Arrival   10/19/2022 06:32    Acuity   Urgent            Means of arrival   Ambulance    Escorted by   [facility]/ICU    Admission type   Emergency            Arrival complaint   Hypoglycemia           Chief Complaint   Patient presents with   • Hypoglycemia - Symptomatic     Pt recently discharged from [facility]  Self checked bg at home was 35  Pt arrives via ems aaox4 gcs 15       Initial Presentation: 74 y o  male with PMH of IDDM and adrenal insufficiency presented to the ED from home via EMS d/t episode of hypoglycemia- BG 35 with altered mental status  Given Glucagon by EMS w/ improvement in MS  This is pts 3rd hypoglycemia episode in the last week requiring multiple ED visits  Last visit was yesterday at an outside hospital, discharged home after improvement  In the ED, BS on arrival 402,  rpt reading 196 after IVF  Given 1L IVF bolus in the ED  CT of head was negative for acute intracranial abnormalities  On exam, alert and oriented, at baseline  Mildly weak and fatigued       Plan: Inpatient admission for evaluation and treatment of  Recurrent hypoglycemia: Hold basal/prandial insulin - maintain slowly on SSI coverage per Accu-Cheks  Check proinsulin and C-peptide  check fasting insulin tomorrow morning  continue to monitor blood sugars and implement hypoglycemia protocol as necessary  Endocrinology Consult  10/19  Endocrinology Consult: Type 1 diabetes, uncontrolled with hypoglycemia  Nph 4 units today, then continue from tomorrow with NPH 8 units daily with breakfast and 2 units daily with dinner with correctional insulin  Monitor Accu-Cheks and adjust insulin regimen as needed  Start Low dose lispro correctional w/ meals  Cont home Hydrocortisone and Fludrocortisone  Mon potassium  IM Notes: Pt w/ fluctuating hypoglycemia w/ BS 62 and hyperglycemia  in a very short period of time  Change in glucose occurred prior to med/ food given  On exam, clammy, open eyes but does not answer questions  Stat labs ordered  Rpt   He became unresponsive, difficulty w/ opening eyes, difficult to appreciate pupillary response and w/ labored breathing  Rapid response called concern for airway d/t worsening mental status  He underwent emergent intubation and transferred to MICU  Stat CT head ordered  ICU Consult: Pt s/p RR for AMS  FSBG 110 during RR however POCT i-STAT glucose <20  Given D50, BG improved but no change in mentation, intubated  Stat CT head grossly unremarkable  Cont lung protective ventilation  SBT/SAT in am  Cont sedation, wean as tolerated  NPO  Avoid hypoglycemia with addition of D5 to fluids  On exam, intubated, sedated, disoriented, normal breath sounds  Date: 10/20  Day 2:   Critical Care Notes: Pt passed SBT trial this am  On exam, following commands, strong cough reflex  Will extubate  Speech swallow eval and advance diet  Check BG q4hrs  Continue metoprolol, midodrine  Insulin NPH per endocrine  Florinef, cortef   Continue sanchez, voiding trial later today  Endocrinology following      ED Triage Vitals   Temperature Pulse Respirations Blood Pressure SpO2 10/19/22 0635 10/19/22 0635 10/19/22 0635 10/19/22 0635 10/19/22 0635   98 5 °F (36 9 °C) 83 18 136/64 98 %      Temp Source Heart Rate Source Patient Position - Orthostatic VS BP Location FiO2 (%)   10/19/22 0635 10/19/22 0635 10/19/22 0730 10/19/22 0730 10/20/22 0300   Oral Monitor Lying Right arm 60      Pain Score       10/19/22 0635       No Pain          Wt Readings from Last 1 Encounters:   10/19/22 94 4 kg (208 lb 1 8 oz)     Additional Vital Signs:   Date/Time Temp Pulse Resp BP MAP (mmHg) SpO2 FiO2 (%) O2 Device Patient Position - Orthostatic VS   10/20/22 1200 99 4 °F (37 4 °C) 78 10 Abnormal  112/75 86 100 % 40 Ventilator Lying   10/20/22 1100 -- 82 19 124/57 76 100 % -- -- --   10/20/22 1000 -- 84 22 166/58 94 100 % -- -- --   10/20/22 0900 99 3 °F (37 4 °C) 78 19 161/73 98 100 % -- -- --   10/20/22 0815 -- 72 21 -- -- 100 % 40  Ventilator --   FiO2 (%): 6/6 at 10/20/22 0815   10/20/22 0806 -- 74 -- 160/69 -- -- -- -- --   10/20/22 0800 -- 66 15 160/69 98 100 % -- -- Lying   10/20/22 0740 -- 68 15 159/77 94 100 % 40 -- --   10/20/22 0700 -- 64 15 151/54 89 100 % 60  Ventilator --   FiO2 (%): 15/450/6 peep at 10/20/22 0700   10/20/22 0600 -- 66 15 161/50 81 100 % -- -- --   10/20/22 0500 -- 64 15 144/60 75 100 % -- -- --   10/20/22 0400 97 8 °F (36 6 °C) 68 20 134/58 81 100 % -- -- --   10/20/22 0300 -- 62 15 134/63 81 100 % 60 -- --   10/20/22 0230 -- 64 24 Abnormal  105/75 90 100 % -- -- --   10/20/22 0200 -- 60 16 131/92 117 100 % -- -- --   10/20/22 0155 -- -- -- -- -- 100 % -- -- --   10/20/22 0100 -- 50 Abnormal  14 117/51 74 100 % -- -- --   10/20/22 0017 -- 48 Abnormal  15 100/47 Abnormal  62 Abnormal  100 % -- -- --   10/20/22 0000 97 5 °F (36 4 °C) 50 Abnormal  14 97/39 Abnormal  62 Abnormal  100 % -- Ventilator --   10/19/22 2302 -- 59 18 102/58 7 Abnormal  100 % -- -- --   10/19/22 2301 -- -- -- -- -- 100 % -- -- --   10/19/22 22:25:28 -- 63 -- 140/74 -- 100 % -- -- --   10/19/22 22:20:13 -- 68 -- 147/74 -- 98 % -- -- --   10/19/22 22:16:23 -- 68 -- -- -- 99 % -- -- --   10/19/22 22:16:09 -- -- -- 128/65 -- -- -- -- --   10/19/22 22:01:56 -- 67 -- 131/67 88 96 % -- -- --   10/19/22 15:45:49 99 1 °F (37 3 °C) 77 18 140/76 97 97 % -- None (Room air) Lying   10/19/22 1100 98 8 °F (37 1 °C) 76 18 154/78 -- 98 % -- None (Room air) --   10/19/22 1000 -- 78 18 119/57 82 98 % -- None (Room air) Lying   10/19/22 0730 -- 78 18 129/60 87 100 % -- None (Room air) Lying       Pertinent Labs/Diagnostic Test Results:   XR abdomen 1 view kub   Final Result by Dorothy Somers MD (10/20 1042)      Nasogastric tube is in the stomach  Workstation performed: [id number]         XR chest portable ICU   Final Result by Dorothy Somers MD (10/20 1040)      Interstitial pulmonary edema  Endotracheal tube inserted into the trachea, with tip 1 9 cm above the beni  Nasogastric tube is in the stomach  Workstation performed: [id number]         CT head wo contrast   Final Result by Rowdy Christianson MD (10/19 2308)      No acute intracranial abnormality  No interval change compared to prior earlier study dated same day  Workstation performed: [id number]         CT head without contrast   Final Result by Keisha Heath MD (10/19 0915)      1  No acute intracranial CT abnormality  2   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  3   Near complete opacification of right maxillary sinus containing hyperattenuating materials, likely inspissated secretions versus fungal colonization                    Workstation performed: [id number]           10/19 EKG result: Normal sinus rhythm  Low voltage QRS  Nonspecific T wave abnormality    Date and Time Eye Opening Best Verbal Response Best Motor Response Doug Coma Scale Score   10/20/22 1200 4 1 6 11   10/20/22 1000 4 1 6 11   10/20/22 0800 3 1 6 10   10/20/22 0524 3 1 6 10   10/20/22 0400 4 1 6 11   10/20/22 0000 3 1 5 9 10/19/22 1100 4 5 6 15   10/19/22 0638 4 5 6 15         Results from last 7 days   Lab Units 10/20/22  0519 10/19/22  2219 10/19/22  2208 10/19/22  0733   WBC Thousand/uL 7 24  --  7 84 11 82*   HEMOGLOBIN g/dL 8 4*  --  9 5* 9 2*   I STAT HEMOGLOBIN g/dl  --  8 8*  --   --    HEMATOCRIT % 26 0*  --  29 6* 28 7*   HEMATOCRIT, ISTAT %  --  26*  --   --    PLATELETS Thousands/uL 181  --  210 178   NEUTROS ABS Thousands/µL 4 42  --  3 98 7 95*     Results from last 7 days   Lab Units 10/20/22  0519   RETIC CT ABS  45,200   RETIC CT PCT % 1 70     Results from last 7 days   Lab Units 10/20/22  0519 10/20/22  0237 10/20/22  0024 10/19/22  2219 10/19/22  0733   SODIUM mmol/L 140 141 141  --  144   POTASSIUM mmol/L 4 3 4 1 3 7  --  3 3*   CHLORIDE mmol/L 111* 109* 111*  --  111*   CO2 mmol/L 25 29 29  --  27   CO2, I-STAT mmol/L  --   --   --  33*  --    ANION GAP mmol/L 4 3* 1*  --  6   BUN mg/dL 9 9 9  --  8   CREATININE mg/dL 1 11 1 05 1 10  --  0 99   EGFR ml/min/1 73sq m 69 74 70  --  79   CALCIUM mg/dL 8 5 8 5 8 1*  --  8 4   CALCIUM, IONIZED, ISTAT mmol/L  --   --   --  1 26  --      Results from last 7 days   Lab Units 10/20/22  0024 10/19/22  2208   AST U/L 7  --    ALT U/L 15  --    ALK PHOS U/L 64  --    TOTAL PROTEIN g/dL 5 6*  --    ALBUMIN g/dL 2 7*  --    TOTAL BILIRUBIN mg/dL 0 45  --    AMMONIA umol/L  --  27     Results from last 7 days   Lab Units 10/20/22  1203 10/20/22  1046 10/20/22  0742 10/20/22  0502 10/20/22  0217 10/20/22  0132 10/20/22  0023 10/19/22  2308 10/19/22  2222 10/19/22  2216 10/19/22  2156 10/19/22  2128   POC GLUCOSE mg/dl 124 155* 113 101 97 116 153* 195* 125 <20* 110 397*     Results from last 7 days   Lab Units 10/20/22  0519 10/20/22  0237 10/20/22  0024 10/19/22  0733   GLUCOSE RANDOM mg/dL 102 100 27* 155*           Results from last 7 days   Lab Units 10/19/22  2219   I STAT BASE EXC mmol/L 7*   ISTAT PH ART  7 436   I STAT ART PCO2 mm HG 47 6*   I STAT ART PO2 mm HG >400 0* I STAT ART HCO3 mmol/L 32 0*                 Results from last 7 days   Lab Units 10/19/22  2208   PTT seconds 32             Results from last 7 days   Lab Units 10/19/22  2208   LACTIC ACID mmol/L 1 3         ED Treatment:   Medication Administration from 10/19/2022 0882 to 10/19/2022 1120       Date/Time Order Dose Route Action     10/19/2022 0930 sodium chloride 0 9 % bolus 1,000 mL 0 mL Intravenous Stopped     10/19/2022 0854 sodium chloride 0 9 % bolus 1,000 mL 0 mL Intravenous Hold     10/19/2022 0720 sodium chloride 0 9 % bolus 1,000 mL 1,000 mL Intravenous New Bag        Past Medical History:   Diagnosis Date   • Diabetes mellitus ([facility] [address] )      Present on Admission:  **None**      Admitting Diagnosis: Hypoglycemia [E16 2]  Age/Sex: 74 y o  male  Admission Orders:  Cardio-Pulmonary Monitoring, Neuro Checks, Nursing dysphagia assesment, I/O, Daily weights, Vital signs    Scheduled Medications:  apixaban, 5 mg, Oral, BID  aspirin, 81 mg, Oral, Daily  atorvastatin, 10 mg, Oral, Daily With Dinner  chlorhexidine, 15 mL, Mouth/Throat, Q12H SRINI  fludrocortisone, 0 2 mg, Oral, Daily  hydrocortisone, 10 mg, Oral, BID  metoprolol tartrate, 12 5 mg, Oral, BID  midodrine, 10 mg, Oral, TID after meals  risperiDONE, 0 5 mg, Oral, BID  sertraline, 100 mg, Oral, Daily  calcium gluconate 1 g in sodium chloride 0 9% 50 mL (premix) IV 10/20 x 1  dextrose 50 % IV solution 50 mL IV 10/20 x 1  potassium chloride oral solution 40 mEq IV 10/20 x 1  magnesium sulfate 2 g/50 mL IVPB (premix) 2 g IV 10/19 x 1    Continuous IV Infusions:  propofol, 5-50 mcg/kg/min, Intravenous, Titrated  dextrose 5 % and sodium chloride 0 45 % infusion  Rate: 50 mL/hr Dose: 50 mL/hr  Freq: Continuous Route: IV  Indications of Use: IV Hydration  Last Dose: Stopped (10/20/22 0754)  Start: 10/20/22 0145 End: 10/20/22 0701    PRN Meds:  fentanyl citrate (PF), 50 mcg, Intravenous, Q1H PRN        IP CONSULT TO CASE MANAGEMENT  IP CONSULT TO ENDOCRINOLOGY  IP CONSULT TO CASE MANAGEMENT    Network Utilization Review Department  ATTENTION: Please call with any questions or concerns to [phone] and carefully listen to the prompts so that you are directed to the right person  All voicemails are confidential   Gregg Lank all requests for admission clinical reviews, approved or denied determinations and any other requests to dedicated fax number below belonging to the campus where the patient is receiving treatment   List of dedicated fax numbers for the Facilities:  [address] DENIALS (Administrative/Medical Necessity) [phone]   [address] (Maternity/NICU/Pediatrics) [phone]   914 Lizz Fuller [phone]   [place] [address] [phone]   [address] [place] [address] Sushil Gross 28 [phone]   [address] [facility] Heena Fiore [facility] 134 815 [facility] [phone]

## 2022-10-20 NOTE — QUICK NOTE
Alert by nurse that lab report blood glucose of 27 on CMP  Peripheral blood glucose 110s  Ordered amp of D50 x1 followed by D5 1/2 NS 50 mL/hour x 5 hours  Repeat BMP in 1 hour

## 2022-10-20 NOTE — RESPIRATORY THERAPY NOTE
RT Ventilator Management Note  Jasmina Carroll 74 y o  male MRN: [id number]  [facility]: [id number]      Daily Screen    No data found in the last 10 encounters  Physical Exam:   Assessment Type: (P) Assess only  General Appearance: (P) Sedated  Respiratory Pattern: (P) Assisted  Chest Assessment: (P) Chest expansion symmetrical  Bilateral Breath Sounds: (P) Diminished  O2 Device: (P) vent      Resp Comments: (P) No vent changes made at this time, will cont to monitor pt per protocol

## 2022-10-20 NOTE — RAPID RESPONSE
Rapid Response Note  Nicole Shah 74 y o  male MRN: [id number]  [facility]: [id number]    Rapid Response Notification(s):   Response called date/time:  10/19/2022 10:10 PM  Response team arrival date/time:  10/19/2022 10:14 PM  Response end date/time:  10/19/2022 10:35 PM  Level of care:  [facility]  Rapid response location:  [facility] unit  Primary reason for rapid response call:  Acute change in neuro status and acute change in RR    Rapid Response Intervention(s):   Airway:  Suction, oral airway and endotracheal intubation  Breathing:  Oxygen and assisted ventilation  Circulation:  None  Fluids administered:  None  Medications administered:  Etomidate and paralytics (succinylcholine)       Assessment:   · Acute encephalopathy resulting in respiratory failure   · Hypoglycemia     Plan:   · BVM ventilation with oral airway   · D50 amp for BG <20, improved to 125  · Intubation with 8 0 ETT (see procedure note)   · STAT CTH   · Transfer to MICU      Rapid Response Outcome:   Transfer:  Transfer to ICU  Primary service notified of transfer: Yes    Code Status: Level 1 (Full Code)      Family notified of transfer: yes by primary team  Family member contacted: Meenu Batista, significant other and Sonia, daughter      Background/Situation:   Nicole Shah is a 74 y o  male hx T1DM and adrenal insufficiency admitted with recurrent hypoglycemia  Primary team called to evaluate patient and found him lying at end out bed horizontal, they checked STAT labs including BG which was 110, attending came to evaluate patient and he was unresponsive, RR called  On arrival to RR patient with snoring respirations, unresponsive to sternal rub, BG <25, given amp D50 while preparing for intubation  Patient intubated for airway protection  BG improved to 125 but mental status did not  Sent for STAT [facility] prior to transfer to MICU       Review of Systems   Unable to perform ROS: Mental status change       Objective:   Vitals: 10/19/22 2220 10/19/22 2225 10/19/22 2301 10/19/22 2302   BP: 147/74 140/74  102/58   BP Location:       Pulse: 68 63  59   Resp:    18   Temp:       TempSrc:       SpO2: 98% 100% 100% 100%   Weight:       Height:         Physical Exam  Vitals reviewed  Constitutional:       General: He is in acute distress  Appearance: He is overweight  He is ill-appearing and diaphoretic  Comments: Unresponsive    Eyes:      Comments: Pupils +2    Cardiovascular:      Rate and Rhythm: Normal rate and regular rhythm  Pulmonary:      Effort: Bradypnea present  Skin:     General: Skin is warm and moist    Neurological:      GCS: GCS eye subscore is 1  GCS verbal subscore is 1  GCS motor subscore is 1  Portions of the record may have been created with voice recognition software  Occasional wrong word or "sound a like" substitutions may have occurred due to the inherent limitations of voice recognition software  Read the chart carefully and recognize, using context, where substitutions have occurred      Ray Juárez PA-C

## 2022-10-20 NOTE — PROGRESS NOTES
Repeat ; however, pt drank none of his OJ  Recheck dome again of 190  TT message sent to on call Mayra HERRMANN to make aware of vastly varying BS   Ordered to recheck BS with a different Glucometer,

## 2022-10-20 NOTE — PLAN OF CARE
Problem: PAIN - ADULT  Goal: Verbalizes/displays adequate comfort level or baseline comfort level  Description: Interventions:  - Encourage patient to monitor pain and request assistance  - Assess pain using appropriate pain scale  - Administer analgesics based on type and severity of pain and evaluate response  - Implement non-pharmacological measures as appropriate and evaluate response  - Consider cultural and social influences on pain and pain management  - Notify physician/advanced practitioner if interventions unsuccessful or patient reports new pain  Outcome: Progressing     Problem: INFECTION - ADULT  Goal: Absence or prevention of progression during hospitalization  Description: INTERVENTIONS:  - Assess and monitor for signs and symptoms of infection  - Monitor lab/diagnostic results  - Monitor all insertion sites, i e  indwelling lines, tubes, and drains  - Monitor endotracheal if appropriate and nasal secretions for changes in amount and color  - Randolph appropriate cooling/warming therapies per order  - Administer medications as ordered  - Instruct and encourage patient and family to use good hand hygiene technique  - Identify and instruct in appropriate isolation precautions for identified infection/condition  Outcome: Progressing     Problem: SAFETY ADULT  Goal: Patient will remain free of falls  Description: INTERVENTIONS:  - Educate patient/family on patient safety including physical limitations  - Instruct patient to call for assistance with activity   - Consult OT/PT to assist with strengthening/mobility   - Keep Call bell within reach  - Keep bed low and locked with side rails adjusted as appropriate  - Keep care items and personal belongings within reach  - Initiate and maintain comfort rounds  - Make Fall Risk Sign visible to staff  Problem: DISCHARGE PLANNING  Goal: Discharge to home or other facility with appropriate resources  Description: INTERVENTIONS:  - Identify barriers to discharge w/patient and caregiver  - Arrange for needed discharge resources and transportation as appropriate  - Identify discharge learning needs (meds, wound care, etc )  - Arrange for interpretive services to assist at discharge as needed  - Refer to Case Management Department for coordinating discharge planning if the patient needs post-hospital services based on physician/advanced practitioner order or complex needs related to functional status, cognitive ability, or social support system  Outcome: Progressing     Problem: Knowledge Deficit  Goal: Patient/family/caregiver demonstrates understanding of disease process, treatment plan, medications, and discharge instructions  Description: Complete learning assessment and assess knowledge base  Interventions:  - Provide teaching at level of understanding  - Provide teaching via preferred learning methods  Outcome: Progressing     Problem: Nutrition/Hydration-ADULT  Goal: Nutrient/Hydration intake appropriate for improving, restoring or maintaining nutritional needs  Description: Monitor and assess patient's nutrition/hydration status for malnutrition  Collaborate with interdisciplinary team and initiate plan and interventions as ordered  Monitor patient's weight and dietary intake as ordered or per policy  Utilize nutrition screening tool and intervene as necessary  Determine patient's food preferences and provide high-protein, high-caloric foods as appropriate       INTERVENTIONS:  - Monitor oral intake, urinary output, labs, and treatment plans  - Assess nutrition and hydration status and recommend course of action  - Evaluate amount of meals eaten  - Assist patient with eating if necessary   - Allow adequate time for meals  - Recommend/ encourage appropriate diets, oral nutritional supplements, and vitamin/mineral supplements  - Order, calculate, and assess calorie counts as needed  - Recommend, monitor, and adjust tube feedings and TPN/PPN based on assessed needs  - Assess need for intravenous fluids  - Provide specific nutrition/hydration education as appropriate  - Include patient/family/caregiver in decisions related to nutrition  Outcome: Progressing     Problem: Prexisting or High Potential for Compromised Skin Integrity  Goal: Skin integrity is maintained or improved  Description: INTERVENTIONS:  - Identify patients at risk for skin breakdown  - Assess and monitor skin integrity  - Assess and monitor nutrition and hydration status  - Monitor labs   - Assess for incontinence   - Turn and reposition patient  - Assist with mobility/ambulation  - Relieve pressure over bony prominences  - Avoid friction and shearing  - Provide appropriate hygiene as needed including keeping skin clean and dry  - Evaluate need for skin moisturizer/barrier cream  - Collaborate with interdisciplinary team   - Patient/family teaching  - Consider wound care consult   Outcome: Progressing     - Apply yellow socks and bracelet for high fall risk patients  - Consider moving patient to room near nurses station  Outcome: Progressing

## 2022-10-20 NOTE — QUICK NOTE
Called pts significant other who states pts next of kin is his daughter Juan Antonio Bond   I informed her that we were intubating the pt and that I was calling his daughter Juan Antonio Bond  Called Sonia who was not sure of his exact wishes but states that he has paperwork in a house down the street  She would get that paper work  She states there is only her and her brother   There was no wife but really her and his significant other Doyle Voss edy usually make most of the decisions  She will not come in now but obtain the paper work for critical care if they could please keep her updated

## 2022-10-20 NOTE — PROGRESS NOTES
TRANSFER NOTE - Santi 74 y o  male MRN: [id number]  Unit/Bed#: MICU 08 Encounter: [id number]      Code Status: Level 1 - Full Code  POA:    POLST:      Reason for ICU admission:   Intubation for symptomatic hypoglycemia    Psychiatric disorder  Assessment & Plan  Continue home meds    Paroxysmal atrial fibrillation   Assessment & Plan  Continue home meds    Adrenal insufficiency   Assessment & Plan  Continue fludrocortisone/hytdrocortisone    Insulin dependent type 1 diabetes mellitus   Assessment & Plan  Lab Results   Component Value Date    HGBA1C 7 5 (H) 07/22/2022       Recent Labs     10/20/22  0742 10/20/22  1046 10/20/22  1203 10/20/22  1501   POCGLU 113 155* 124 136       Blood Sugar Average: Last 72 hrs:  (P) 277 [id number]       Symptomatic hypoglycemia  Now controlled 113-155  -Endocrine consulted  -If glucose >200, give 2U lantus  -advance diet  -q4 glucose check    * Recurrent hypoglycemia  Assessment & Plan  Required intubation for symptomatic  Extubated and now O2 requirement  No longer needs ICU level care        Active problems:   Principal Problem:    Recurrent hypoglycemia  Active Problems:    Insulin dependent type 1 diabetes mellitus     Hypokalemia    Adrenal insufficiency     Essential hypertension    Paroxysmal atrial fibrillation     History of stroke    Anemia of chronic disease    Hyperlipidemia    Psychiatric disorder  Resolved Problems:    * No resolved hospital problems  *      Consultants:   Endocrine      History of Present Illness:   Kate Oreilly is a 74 yr old M with PMH of T1DM, bilat basal ganglia stroke with hemorrhage Aug 2022, HFpEF, HTN, new onset PAF on Eliquis,  Adrenal insufficiency, hyperlipidemia, MDD who presented 10/19 with symptomatic hypoglycemia as encephalopathy, with unresponsiveness in the AM 10/19  Rapid response for AMS and intubated for encephalopathy, sugar was 30, iSTAT <20 and received D50 and glucose improved to 125   Given etomidate and succinylcholine for intubation  Summary of clinical course:     Decreased propofol, glucose and mental status improved  Extubated and diet advanced  Lantus 2U if glucose over 200  Management per endocrine  Recent or scheduled procedures:   none    Outstanding/pending diagnostics:   Glucose,     Cultures:   none       Mobilization Plan:   PT/OT    Nutrition Plan:   Regular diabetic     Invasive Devices Review  Invasive Devices  Report    Peripheral Intravenous Line  Duration           Peripheral IV 10/19/22 Left Antecubital 1 day    Peripheral IV 10/20/22 Distal;Left;Upper;Ventral (anterior) Arm <1 day    Peripheral IV 10/20/22 Right;Upper;Ventral (anterior) Arm <1 day          Drain  Duration           External Urinary Catheter Medium <1 day                Rationale for remaining devices: meds    VTE Pharmacologic Prophylaxis: Apixaban (Eliquis)  VTE Mechanical Prophylaxis: sequential compression device    Discharge Plan:   Patient should be ready for discharge to [facility] on 10/20/22  Initial Physical Therapy Recommendations: Pending   Initial Occupational Therapy Recommendations: Pending   Initial /Plan: Pending     Home medications that are not reordered and reason why:   - None     Spoke with Dr Amira Guerin  regarding transfer  Please contact critical care via [employer] with any questions or concerns  Donna Burgos MD  PGY-1, Internal Medicine  10/20/22  5:26 PM    Dear reader, please be aware that portions of my note contain dictated text  I have done my best to proof-read this note prior to signing  However, there may be occasional unnoticed errors pertaining to "sound-alike" words and/or grammar during my dictation process  If there is any words or information that is unclear or appears erroneous, please kindly let me know and I will clarify and/or addend my notes accordingly  Thank you for your understanding

## 2022-10-20 NOTE — RESPIRATORY THERAPY NOTE
RT Ventilator Management Note  Sonja Hivmoris 74 y o  male MRN: [id number]  [facility]: [id number]      Daily Screen         10/20/2022  0810             Patient safety screen outcome[de-identified] Passed    Spont breathing trial % for 30 min: --              Physical Exam:   Assessment Type: Assess only  General Appearance: Sedated  Respiratory Pattern: Assisted  Chest Assessment: Chest expansion symmetrical  Bilateral Breath Sounds: Diminished  O2 Device: vent      Resp Comments: pt suctioned orally and down ett  cuff deflated and pt extubated at this time  pt placed on 6lpm humidified nc pt able to speak  no stridor noted

## 2022-10-20 NOTE — PROCEDURES
Intubation    Date/Time: 10/19/2022 10:20 PM  Performed by: Alvarez Eller MD  Authorized by: Alvarez Eller MD     Patient location:  Bedside  Consent:     Consent obtained:  Emergent situation  Pre-procedure details:     Patient status:  Unresponsive    Pretreatment medications:  Etomidate    Paralytics:  Succinylcholine  Indications:     Indications for intubation: respiratory failure    Procedure details:     Preoxygenation:  Bag valve mask    CPR in progress: no      Intubation method:  Oral    Oral intubation technique:  Glidescope    Laryngoscope blade: Mac 3    Tube size (mm):  8 0    Tube type:  Cuffed    Number of attempts:  1  Placement assessment:     ETT to teeth:  23    Tube secured with:  ETT arevalo    Breath sounds:  Equal    Placement verification: chest rise, condensation, direct visualization, equal breath sounds and ETCO2 detector    Post-procedure details:     Patient tolerance of procedure:   Tolerated well, no immediate complications

## 2022-10-20 NOTE — PROGRESS NOTES
Spiritual Care Progress Note    10/20/2022  Patient: Jami Rivera : 1955  Admission Date & Time: 10/19/2022 4610  MRN: [id number] [facility]: [id number]      Karen Castillo briefly visited pt from a referral from the overnight   Pt is intubated and unable to truly speak, but was able to communicate in one word answers   provided caring and comforting words and a prayer for pt  Chaplains remain available                 Chaplaincy Interventions Utilized:     Relationship Building: Cultivated a relationship of care and support    Ritual: Provided prayer

## 2022-10-20 NOTE — RESPIRATORY THERAPY NOTE
RT Protocol Note  Sandy Garcia 74 y o  male MRN: [id number]  Unit/Bed#: MICU 08 Encounter: [id number]    Assessment    Principal Problem:    Recurrent hypoglycemia  Active Problems:    Insulin dependent type 1 diabetes mellitus     Hypokalemia    Adrenal insufficiency     Essential hypertension    Paroxysmal atrial fibrillation     History of stroke    Anemia of chronic disease    Hyperlipidemia    Psychiatric disorder      Home Pulmonary Medications:  N/a       Past Medical History:   Diagnosis Date    Diabetes mellitus ([facility] [address] )      Social History     Socioeconomic History    Marital status: Single     Spouse name: None    Number of children: None    Years of education: None    Highest education level: None   Occupational History    None   Tobacco Use    Smoking status: Former Smoker     Years: 3 00     Types: Cigarettes    Smokeless tobacco: None   Substance and Sexual Activity    Alcohol use: Not Currently     Alcohol/week: 5 0 standard drinks     Types: 5 Cans of beer per week     Comment: Quit in august    Drug use: Never    Sexual activity: None   Other Topics Concern    None   Social History Narrative    None     Social Determinants of Health     Financial Resource Strain: Not on file   Food Insecurity: No Food Insecurity    Worried About Running Out of Food in the Last Year: Never true    Ran Out of Food in the Last Year: Never true   Transportation Needs: Not on file   Physical Activity: Not on file   Stress: Not on file   Social Connections: Not on file   Intimate Partner Violence: Not on file   Housing Stability: Low Risk     Unable to Pay for Housing in the Last Year: No    Number of Places Lived in the Last Year: 1    Unstable Housing in the Last Year: No       Subjective         Objective    Physical Exam:   Assessment Type: Assess only  General Appearance: Sedated  Respiratory Pattern: Assisted  Chest Assessment: Chest expansion symmetrical  Bilateral Breath Sounds: Diminished  O2 Device: vent    Vitals:  Blood pressure 102/58, pulse 59, temperature 99 1 °F (37 3 °C), temperature source Oral, resp  rate 18, height 5' 4" (1 626 m), weight 89 8 kg (198 lb), SpO2 100 %  Results from last 7 days   Lab Units 10/19/22  2219   DEBO TEST  Positive Allens Test       Imaging and other studies: I have personally reviewed pertinent reports  O2 Device: vent     Plan    Respiratory Plan: Vent/NIV/HFNC        Resp Comments: Pt brought down to MICU bed 8 and placed on vent at this time AC/VC settings  Pt per chart has no resp hx other than smoking hx and takes no resp meds at home  Will cont to monitor pt per protocol while on vent

## 2022-10-20 NOTE — PROGRESS NOTES
Daily Progress Note - Critical Care   Kervin Smith 74 y o  male MRN: [id number]  Unit/Bed#: MICU 08 Encounter: [id number]        ----------------------------------------------------------------------------------------  HPI:  Duke Ozuna is a 74 yr old M with PMH of T1DM, bilat basal ganglia stroke with hemorrhage Aug 2022, HFpEF, HTN, new onset PAF on Eliquis,  Adrenal insufficiency, hyperlipidemia, MDD who presented 10/19 with symptomatic hypoglycemia as encephalopathy, with unresponsiveness in the AM 10/19  Rapid response for AMS and intubated for encephalopathy, sugar was 30, iSTAT <20 and received D50 and glucose improved to 125  Given etomidate and succinylcholine for intubation  Per Endocrinology, variable blood sugars with recurrent hypoglycemia and secondary adrenal insufficiency  Memory issues and difficulties with medications due to cost  Dexcom in place without   Home insulin NPH 8 u AM and 2 u at night, regular insulin 6u BID with meals  Past week had 3 episodes with ED admission for glucose 35  Mismatch with AM CMP 27 while peripheral 110s  24hr events: No acute event reported overnight per sign-out or nursing staff  On SBT      Vitals:  Temp:  [97 5 °F (36 4 °C)-99 1 °F (37 3 °C)] 97 8 °F (36 6 °C)  HR:  [48-78] 66  Resp:  [14-24] 15  BP: ()/(39-92) 161/50  SpO2:  [96 %-100 %] 100 %    I&Os:  10/19 0701 - 10/20 0700  In: 580 8 [I V :230 8]  Out: -     ---------------------------------------------------------------------------------------  SUBJECTIVE  Patient is intubated and alert and responsive  Propofol at 15  NGT     Review of Systems  Review of systems was reviewed and negative unless stated above in HPI/24-hour events   ---------------------------------------------------------------------------------------  Assessment and Plan:  1  Hypoglycemic encephalopathy  2  T1DM   3  Adrenal insufficiency  4   Intubated and sedated without respiratory distress         Neuro: · Diagnosis:  Encephalopathy - likely secondary to hypoglycemia  ? Patient is alert and responsive   ? Plan precautions  ? Sedation:  Propofol, fentanyl p r n   ? Pain:  None  · Diagnosis:  History of CVA  ? Continue aspirin and statin  · Diagnosis:  Psychiatric disorder - history of MDD  ? Continue Zoloft 100 mg daily  ? Continue risperidone 0 5 mg b i d         CV:   · Diagnosis:  Atrial fibrillation  ? Continue Eliquis 5 mg b i d   ? Continue metoprolol tartrate 12 5 mg b i d   ? Currently rate controlled  · Diagnosis:  HFpEF  ? Not on diuretic  ? EF 55-60%  ? Monitor fluid status closely  · Diagnosis:  Hypotension - history of adrenal insufficiency  ? Continue midodrine, hydrocortisone, fludrocortisone        Pulm:  · Diagnosis:  Acute hypoxic respiratory failure - intubated for airway protection  ? Interstitial pulm edema  ? Intubated and sedated  ? Wean as tolerated  ? SBT daily  ? VAP bundle        GI:   · Diagnosis:  No acute issues  ? NPO, NGT  ? No need for ulcer prophylaxis        :   · Diagnosis:  No acute issues  ? Monitor I/O  ? Daily BMP        F/E/N:   · Fluids:  None  · Electrolytes:  Replete as needed  · Nutrition:  NPO        Heme/Onc:   · Diagnosis:  Anemia - no signs of bleeding  ? Hemoglobin 9 5, POA hemoglobin 8 4  ? Mild macrocytic   ? Baseline hemoglobin 10-12  ? Follow-up reticulocyte count        Endo:   · Diagnosis:  Type 1 diabetes mellitus with hypoglycemia - noted to have mixed hyper/hypoglycemia using fingerstick which seems to be unreliable  ? Endocrinology following, appreciate recommendations  ? Obtain fingerstick glucose from arterial line  ? SSI  ? Hold basal insulin for now  ? Pending proinsulin and cpeptide  · Diagnosis:  Adrenal insufficiency  ? -144, potassium normal  ? Continue midodrine 10 mg t i d   ? Continue hydrocortisone 10 mg daily  ? Continue fludrocortisone 0 2 mg daily        ID:   · Diagnosis:  No acute issues  ?  Continue to monitor for fevers        MSK/Skin:   · Diagnosis:  No acute issues  ? Pressure ulcer prophylaxis  ? Frequent turning and repositioning        Disposition: Continue Critical Care   Code Status: Level 1 - Full Code  ---------------------------------------------------------------------------------------  ICU CORE MEASURES    Prophylaxis   VTE Pharmacologic Prophylaxis: Apixaban (Eliquis)  VTE Mechanical Prophylaxis: sequential compression device  Stress Ulcer Prophylaxis: Prophylaxis Not Indicated     ABCDE Protocol (if indicated)  Plan to perform spontaneous awakening trial today? Yes  Plan to perform spontaneous breathing trial today? Yes  Obvious barriers to extubation? No  CAM-ICU:     Invasive Devices Review  Invasive Devices  Report    Peripheral Intravenous Line  Duration           Peripheral IV 10/19/22 Left Antecubital 1 day    Peripheral IV 10/20/22 Distal;Left;Upper;Ventral (anterior) Arm <1 day    Peripheral IV 10/20/22 Right;Upper;Ventral (anterior) Arm <1 day          Drain  Duration           External Urinary Catheter Medium <1 day    NG/OG/Enteral Tube Nasogastric Right nare <1 day    NG/OG/Enteral Tube Orogastric Center mouth <1 day          Airway  Duration           ETT  Cuffed 8 mm <1 day              Can any invasive devices be discontinued today?  No  ---------------------------------------------------------------------------------------  OBJECTIVE    Vitals   Vitals:    10/20/22 0300 10/20/22 0400 10/20/22 0500 10/20/22 0600   BP: 134/63 134/58 144/60 161/50   Pulse: 62 68 64 66   Resp: 15 20 15 15   Temp:  97 8 °F (36 6 °C)     TempSrc:  Axillary     SpO2: 100% 100% 100% 100%   Weight:       Height:         Temp (24hrs), Av 3 °F (36 8 °C), Min:97 5 °F (36 4 °C), Max:99 1 °F (37 3 °C)  Current: Temperature: 97 8 °F (36 6 °C)  HR: Pulse: 66 (10/20/22 0600)  BP: Blood Pressure: 161/50 (10/20/22 0600)  RR: Respirations: 15 (10/20/22 0600)  SpO2: SpO2: 100 % (10/20/22 0600)    Respiratory:  SpO2: SpO2: 100 %       Invasive/non-invasive ventilation settings   Respiratory  Report   Lab Data (Last 4 hours)    None         O2/Vent Data (Last 4 hours)      10/20 0347          FIO2 (%) (%) 60                     Physical exam:    General- alert and arousable, intubated and sedated  CV- regular rate, no murmurs  Pulm- clear to ausculation  Abd- soft nontender  Extremities- no peripheral edema, bilat wrapped wounds  Neuro- responds to command, opens eyes, moves extremities    Laboratory and Diagnostics:  Results from last 7 days   Lab Units 10/20/22  0519 10/19/22  2219 10/19/22  2208 10/19/22  0733   WBC Thousand/uL 7 24  --  7 84 11 82*   HEMOGLOBIN g/dL 8 4*  --  9 5* 9 2*   I STAT HEMOGLOBIN g/dl  --  8 8*  --   --    HEMATOCRIT % 26 0*  --  29 6* 28 7*   HEMATOCRIT, ISTAT %  --  26*  --   --    PLATELETS Thousands/uL 181  --  210 178   NEUTROS PCT % 62  --  51 67   MONOS PCT % 15*  --  18* 11     Results from last 7 days   Lab Units 10/20/22  0519 10/20/22  0237 10/20/22  0024 10/19/22  2219 10/19/22  0733   SODIUM mmol/L 140 141 141  --  144   POTASSIUM mmol/L 4 3 4 1 3 7  --  3 3*   CHLORIDE mmol/L 111* 109* 111*  --  111*   CO2 mmol/L 25 29 29  --  27   CO2, I-STAT mmol/L  --   --   --  33*  --    ANION GAP mmol/L 4 3* 1*  --  6   BUN mg/dL 9 9 9  --  8   CREATININE mg/dL 1 11 1 05 1 10  --  0 99   CALCIUM mg/dL 8 5 8 5 8 1*  --  8 4   GLUCOSE RANDOM mg/dL 102 100 27*  --  155*   ALT U/L  --   --  15  --   --    AST U/L  --   --  7  --   --    ALK PHOS U/L  --   --  64  --   --    ALBUMIN g/dL  --   --  2 7*  --   --    TOTAL BILIRUBIN mg/dL  --   --  0 45  --   --           Results from last 7 days   Lab Units 10/19/22  2208   PTT seconds 32          Results from last 7 days   Lab Units 10/19/22  2208   LACTIC ACID mmol/L 1 3     ABG:    VBG:          Micro      None    EKG: No recent EKG last 24hr   NSR    Imaging:     CTH  1  No acute intracranial CT abnormality    2   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  3   Near complete opacification of right maxillary sinus containing hyperattenuating materials, likely inspissated secretions versus fungal colonization  CXR  Nasogastric tube is in the stomach  There is a nonobstructive bowel gas pattern        I have personally reviewed pertinent reports  Intake and Output  I/O       10/18 0701  10/19 0700 10/19 0701  10/20 0700 10/20 0701  10/21 0700    I V  (mL/kg)  230 8 (2 4)     NG/GT  250     IV Piggyback  100     Total Intake(mL/kg)  580 8 (6 2)     Net  +580 8            Unmeasured Stool Occurrence  1 x             Height and Weights   Height: 5' 4" (162 6 cm)  IBW (Ideal Body Weight): 59 2 kg  Body mass index is 35 72 kg/m²  Weight (last 2 days)     Date/Time Weight    10/19/22 2235 94 4 (208 11)    10/19/22 1100 89 8 (198)    10/19/22 0637 88 (194)            Nutrition       Diet Orders   (From admission, onward)             Start     Ordered    10/19/22 2235  Diet NPO  Diet effective now        References:    Nutrtion Support Algorithm Enteral vs  Parenteral   Question Answer Comment   Diet Type NPO    RD to adjust diet per protocol?  Yes        10/19/22 2234                    Active Medications  Scheduled Meds:  Current Facility-Administered Medications   Medication Dose Route Frequency Provider Last Rate   • apixaban  5 mg Oral BID Shahana Spencer MD     • aspirin  81 mg Oral Daily Shahana Spencer MD     • atorvastatin  10 mg Oral Daily With Alfred Yu MD     • chlorhexidine  15 mL Mouth/Throat Q12H [facility] Luis Fernando Sheikh DO     • dextrose          • fentanyl citrate (PF)  50 mcg Intravenous Q1H PRN Luis Fernando Sheikh DO     • fludrocortisone  0 2 mg Oral Daily Shahana Spencer MD     • hydrocortisone  10 mg Oral Q24H Tee Lowe MD     • hydrocortisone  10 mg Oral Q24H Oluwatomisin Renetta Cruz MD     • metoprolol tartrate  12 5 mg Oral BID Shahana Spencer MD     • midodrine  10 mg Oral TID after meals Shahana Spencer MD • propofol  5-50 mcg/kg/min Intravenous Titrated Luis Fernando Sheikh, DO 25 mcg/kg/min (10/20/22 9488)   • risperiDONE  0 5 mg Oral BID Tony Jorge MD     • sertraline  100 mg Oral Daily Tony Jorge MD       Continuous Infusions:  propofol, 5-50 mcg/kg/min, Last Rate: 25 mcg/kg/min (10/20/22 3191)      PRN Meds:   fentanyl citrate (PF), 50 mcg, Q1H PRN        Allergies   Allergies   Allergen Reactions   • Imipramine Other (See Comments)   • Lisinopril Other (See Comments)   • Paroxetine Other (See Comments)   • Penicillins Hives   • Rosiglitazone Other (See Comments)   • Troglitazone Other (See Comments)     ---------------------------------------------------------------------------------------  Advance Directive and Living Will:      Power of :    POLST:    ---------------------------------------------------------------------------------------  Care Time Delivered:   30 minutes    Jatin Solis MD      Portions of the record may have been created with voice recognition software  Occasional wrong word or "sound a like" substitutions may have occurred due to the inherent limitations of voice recognition software    Read the chart carefully and recognize, using context, where substitutions have occurred

## 2022-10-20 NOTE — CASE MANAGEMENT
Case Management Discharge Planning Note    Patient name Divine Garrett  Location MICU 08/MICU 08 MRN [id number]  : 1955 Date 10/20/2022       Current Admission Date: 10/19/2022  Current Admission Diagnosis:Recurrent hypoglycemia   Patient Active Problem List    Diagnosis Date Noted   • Recurrent hypoglycemia 10/19/2022   • Insulin dependent type 1 diabetes mellitus  10/19/2022   • Hypokalemia 10/19/2022   • Adrenal insufficiency  10/19/2022   • Essential hypertension 10/19/2022   • Paroxysmal atrial fibrillation  10/19/2022   • History of stroke 10/19/2022   • Anemia of chronic disease 10/19/2022   • Hyperlipidemia 10/19/2022   • Psychiatric disorder 10/19/2022      LOS (days): 1  Geometric Mean LOS (GMLOS) (days): 3 90  Days to GMLOS:2 7     OBJECTIVE:  Risk of Unplanned Readmission Score: 15 27         Current admission status: Inpatient   Preferred Pharmacy:   [address], [address] [facility] [address]  Phone: [phone] Fax: [phone]    Primary Care Provider: No primary care provider on file  Primary Insurance: [facility] REP  Secondary Insurance:     DISCHARGE DETAILS:                 Additional Comments: Received call from [facility] [address]  at [facility], they are current with patient, return referral placed in [address] [place] [address] ,  CM will continue to follow

## 2022-10-20 NOTE — PROGRESS NOTES
Progress Note - Nayely Betancur 74 y o  male MRN: [id number]    Unit/Bed#: [facility] 629-01 Encounter: [id number]      CC: diabetes f/u    Subjective:   Nayely Betancur is a 74y o  year old male with brittle type 1 diabetes admitted with recurrent hypoglycemia  Overnight events noted, patient became unresponsive secondary to hypoglycemia, was intubated and transferred to MICU  Had discrepancies in blood sugars between sugars obtained with accucheck, BMP draw and arterial draw  Successfully extubated this morning  On encounter, patient did not recall any details of events  Objective:     Vitals: Blood pressure (!) 113/48, pulse 76, temperature 99 4 °F (37 4 °C), temperature source Oral, resp  rate (!) 27, height 5' 4" (1 626 m), weight 94 4 kg (208 lb 1 8 oz), SpO2 100 %  ,Body mass index is 35 72 kg/m²  Intake/Output Summary (Last 24 hours) at 10/20/2022 1905  Last data filed at 10/20/2022 1245  Gross per 24 hour   Intake 746 71 ml   Output 825 ml   Net -78 29 ml       Physical Exam:  General Appearance: awake, appears stated age and cooperative  Head: Normocephalic, without obvious abnormality, atraumatic  Extremities: moves all extremities  Skin: Skin color and temperature normal    Pulm: no labored breathing    Lab, Imaging and other studies: I have personally reviewed pertinent reports  POC Glucose (mg/dl)   Date Value   10/20/2022 158 (H)   10/20/2022 136   10/20/2022 124   10/20/2022 155 (H)   10/20/2022 113   10/20/2022 101   10/20/2022 97   10/20/2022 116   10/20/2022 153 (H)   10/19/2022 195 (H)       Assessment and plan:  #Brittle type 1 diabetes with recurrent hypoglycemia    A1c 7 5 (07/2022)  Home regimen insulin NPH 18 units with breakfast, 2 units at bedtime, regular insulin 10 units with breakfast and dinner      Recommendations: Given his history of T1DM,  Will start on low dose basal bolus insulin regimen starting with 2 units of lantus daily (only to be given if glucose is above 200 mg/dl and modified correctional insulin at algorithm 1 (for glucose >200 mg/dl)    Discussed extensively with patient's daughter who was met bedside about his clinical condition including very brittle diabetes and hypoglycemia unawareness  Likely safer to change his home insulin regimen from Nph to Lantus and humalog  She promised to look again into the cost and may be able to help with his copay  Also promised to bring patient's dexcom reader during next visit  Portions of the record may have been created with voice recognition software

## 2022-10-20 NOTE — ASSESSMENT & PLAN NOTE
Lab Results   Component Value Date    HGBA1C 7 5 (H) 07/22/2022       Recent Labs     10/20/22  0742 10/20/22  1046 10/20/22  1203 10/20/22  1501   POCGLU 113 155* 124 136       Blood Sugar Average: Last 72 hrs:  (P) 693 [id number]       Symptomatic hypoglycemia  Now controlled 113-155  -Endocrine consulted  -If glucose >200, give 2U lantus  -advance diet  -q4 glucose check

## 2022-10-20 NOTE — DISCHARGE INSTR - OTHER ORDERS
Wound Care Plan:   1-Hydraguard lotion to bilateral heels twice daily and as needed  2-Elevate heels off of bed/chair surface to offload pressure  3-Offloading air cushion in chair when out of bed, if able  4-Moisturize skin daily with skin nourishing cream   5-Turn/reposition every 2 hours while in bed using positioning wedges; and weight shift frequently while in chair for pressure re-distribution on skin  6-Left dorsal foot--apply 3m no sting skin prep to eschar daily  7-Bilateral ankles--cleanse with normal saline, pat dry  Apply Dermagran to wound and cover with ABD  Wrap with delmer  Change dressings every other day

## 2022-10-20 NOTE — ASSESSMENT & PLAN NOTE
Required intubation for symptomatic  Extubated and now O2 requirement  No longer needs ICU level care

## 2022-10-20 NOTE — CONSULTS
MICU acceptance note- Santi 74 y o  male MRN: [id number]  Unit/Bed#: -01 Encounter: [id number]      -------------------------------------------------------------------------------------------------------------  Chief Complaint:  Encephalopathy    History of Present Illness   HX and PE limited by:  Encephalopathy  Kervin Smith is a 74 y o  male with past medical history of type 1 diabetes mellitus, history of stroke, paroxysmal atrial fibrillation on Eliquis, HFpEF, hypertension, adrenal insufficiency, hyperlipidemia, MDD who presented on 10/19 with symptomatic hypoglycemia (glucose 30)  Patient arrived via EMS after noted to be unresponsive in the chair on the morning of 10/19  Patient received glucagon AMS with improvement in glucose and mentation  Patient was admitted to medicine with Endocrinology consulted for unclear etiology of recurrent hypoglycemia  Rapid response called on the evening of 10/19 for altered mental status  Patient has been having variable blood sugars ranging between 60 and 397 on fingerstick prior to our response  Patient was intubated given encephalopathy  FSBG 110 during RR however POCT i-STAT glucose <20  Patient received amp D50 all preparing for intubation  Blood glucose improved to 125 blood mentation did not  Stat CT head grossly unremarkable; final read pending  Patient admitted to MICU for further management      History obtained from chart review and unobtainable from patient due to mental status   -------------------------------------------------------------------------------------------------------------  Assessment and Plan:    Neuro:   • Diagnosis:  Encephalopathy - likely secondary to hypoglycemia  o See management of hypoglycemia below  o Plan precautions  o Sedation:  Propofol, fentanyl p r n   o Pain:  None  • Diagnosis:  History of CVA  o Continue aspirin and statin  • Diagnosis:  Psychiatric disorder - history of MDD  o Continue Zoloft 100 mg daily  o Continue risperidone 0 5 mg b i d       CV:   • Diagnosis:  Atrial fibrillation  o Continue Eliquis 5 mg b i d   o Continue metoprolol tartrate 12 5 mg b i d   o Currently rate controlled  • Diagnosis:  HFpEF  o Not on diuretic  o EF 55-60%  o Monitor fluid status closely  • Diagnosis:  Hypotension - history of adrenal insufficiency  o Continue midodrine, hydrocortisone, fludrocortisone      Pulm:  • Diagnosis:  Acute hypoxic respiratory failure - intubated for airway protection  o Intubated and sedated  o Wean as tolerated  o SBT daily  o VAP bundle      GI:   • Diagnosis:  No acute issues  o NPO  o No need for ulcer prophylaxis      :   • Diagnosis:  No acute issues  o Monitor I/O  o Daily BMP      F/E/N:   • Fluids:  None  • Electrolytes:  Replete as needed  • Nutrition:  NPO      Heme/Onc:   • Diagnosis:  Anemia - no signs of bleeding  o Hemoglobin 9 5, POA hemoglobin 8 2  o Baseline hemoglobin 10-12  o Follow-up reticulocyte count      Endo:   • Diagnosis:  Type 1 diabetes mellitus with hypoglycemia - noted to have mixed hyper/hypoglycemia using fingerstick which seems to be unreliable   o Endocrinology following, appreciate recommendations  o Obtain fingerstick glucose from arterial line  o SSI  o Hold basal insulin for now  • Diagnosis:  Adrenal insufficiency  o Continue midodrine 10 mg t i d   o Continue hydrocortisone 10 mg daily  o Continue fludrocortisone 0 2 mg daily      ID:   • Diagnosis:  No acute issues  o Continue to monitor for fevers      MSK/Skin:   • Diagnosis:  No acute issues  o Pressure ulcer prophylaxis  o Frequent turning and repositioning    Disposition: Admit to Critical Care   Code Status: Level 1 - Full Code  --------------------------------------------------------------------------------------------------------------  Review of Systems   Unable to perform ROS: Mental status change       Review of systems was unable to be performed secondary to AMS    Physical Exam  Vitals reviewed  Constitutional:       General: He is not in acute distress  Appearance: Normal appearance  He is not ill-appearing  Eyes:      General: No scleral icterus  Conjunctiva/sclera: Conjunctivae normal    Cardiovascular:      Rate and Rhythm: Normal rate and regular rhythm  Heart sounds: Normal heart sounds  No murmur heard  Pulmonary:      Effort: Pulmonary effort is normal  No respiratory distress  Breath sounds: Normal breath sounds  No wheezing or rales  Abdominal:      General: Bowel sounds are normal       Palpations: Abdomen is soft  Tenderness: There is no abdominal tenderness  Musculoskeletal:      Right lower leg: No edema  Left lower leg: No edema  Skin:     General: Skin is warm and dry  Neurological:      Mental Status: He is disoriented  Comments: Intubated and sedated       --------------------------------------------------------------------------------------------------------------  Vitals:   Vitals:    10/19/22 2216 10/19/22 2216 10/19/22 2220 10/19/22 2225   BP: 128/65  147/74 140/74   BP Location:       Pulse:  68 68 63   Resp:       Temp:       TempSrc:       SpO2:  99% 98% 100%   Weight:       Height:         Temp  Min: 98 5 °F (36 9 °C)  Max: 99 1 °F (37 3 °C)  IBW (Ideal Body Weight): 59 2 kg  Height: 5' 4" (162 6 cm)  Body mass index is 33 99 kg/m²    N/A    Laboratory and Diagnostics:  Results from last 7 days   Lab Units 10/19/22  2219 10/19/22  2208 10/19/22  0733   WBC Thousand/uL  --  7 84 11 82*   HEMOGLOBIN g/dL  --  9 5* 9 2*   I STAT HEMOGLOBIN g/dl 8 8*  --   --    HEMATOCRIT %  --  29 6* 28 7*   HEMATOCRIT, ISTAT % 26*  --   --    PLATELETS Thousands/uL  --  210 178   NEUTROS PCT %  --  51 67   MONOS PCT %  --  18* 11     Results from last 7 days   Lab Units 10/19/22  2219 10/19/22  0733   SODIUM mmol/L  --  144   POTASSIUM mmol/L  --  3 3*   CHLORIDE mmol/L  --  111*   CO2 mmol/L  --  27   CO2, I-STAT mmol/L 33*  -- ANION GAP mmol/L  --  6   BUN mg/dL  --  8   CREATININE mg/dL  --  0 99   CALCIUM mg/dL  --  8 4   GLUCOSE RANDOM mg/dL  --  155*          Results from last 7 days   Lab Units 10/19/22  2208   PTT seconds 32          Results from last 7 days   Lab Units 10/19/22  2208   LACTIC ACID mmol/L 1 3     ABG:    VBG:          Micro:        EKG: n/a  Imaging: I have personally reviewed pertinent reports  Historical Information   Past Medical History:   Diagnosis Date   • Diabetes mellitus ([address] )      History reviewed  No pertinent surgical history  Social History   Social History     Substance and Sexual Activity   Alcohol Use Not Currently   • Alcohol/week: 5 0 standard drinks   • Types: 5 Cans of beer per week    Comment: Quit in august     Social History     Substance and Sexual Activity   Drug Use Never     Social History     Tobacco Use   Smoking Status Former Smoker   • Years: 3 00   • Types: Cigarettes   Smokeless Tobacco Not on file     Exercise History: n/a  Family History:   History reviewed  No pertinent family history    I have reviewed this patient's family history and commented on sigificant items within the HPI      Medications:  Current Facility-Administered Medications   Medication Dose Route Frequency   • apixaban (ELIQUIS) tablet 5 mg  5 mg Oral BID   • [START ON 10/20/2022] aspirin chewable tablet 81 mg  81 mg Oral Daily   • [START ON 10/20/2022] atorvastatin (LIPITOR) tablet 10 mg  10 mg Oral Daily With Dinner   • chlorhexidine (PERIDEX) 0 12 % oral rinse 15 mL  15 mL Mouth/Throat Q12H [facility]   • dextrose 50 % IV solution **ADS Override Pull**       • fludrocortisone (FLORINEF) tablet 0 2 mg  0 2 mg Oral Daily   • [START ON 10/20/2022] hydrocortisone (CORTEF) tablet 10 mg  10 mg Oral Q24H   • hydrocortisone (CORTEF) tablet 10 mg  10 mg Oral Q24H   • insulin lispro (HumaLOG) 100 units/mL subcutaneous injection 1-6 Units  1-6 Units Subcutaneous 4x Daily (AC & HS)   • metoprolol tartrate (LOPRESSOR) partial tablet 12 5 mg  12 5 mg Oral BID   • midodrine (PROAMATINE) tablet 10 mg  10 mg Oral TID after meals   • potassium chloride (K-DUR,KLOR-CON) CR tablet 20 mEq  20 mEq Oral BID   • risperiDONE (RisperDAL) tablet 0 5 mg  0 5 mg Oral BID   • sertraline (ZOLOFT) tablet 100 mg  100 mg Oral Daily     Home medications:  Prior to Admission Medications   Prescriptions Last Dose Informant Patient Reported? Taking?    NON FORMULARY   Yes Yes   Sig: Take 30 mcg by mouth daily at bedtime [place] 30 mcg @ bedtime   NON FORMULARY   Yes Yes   Si Bottle if needed Relion Glucose   apixaban (ELIQUIS) 5 mg 10/18/2022 at Unknown time  Yes Yes   Sig: Take 5 mg by mouth 2 (two) times a day   aspirin 81 mg chewable tablet 10/18/2022 at Unknown time  Yes Yes   Sig: Chew 81 mg daily   atorvastatin (LIPITOR) 10 mg tablet 10/18/2022 at Unknown time  Yes Yes   Sig: Take 1 tablet by mouth daily   fludrocortisone (FLORINEF) 0 1 mg tablet 10/18/2022 at Unknown time  Yes Yes   Sig: Take 0 2 mg by mouth daily   hydrocortisone (CORTEF) 10 mg tablet 10/18/2022 at Unknown time  Yes Yes   Sig: Take 10 mg by mouth 2 (two) times a day   insulin NPH (HumuLIN N,NovoLIN N) 100 Units/mL subcutaneous injection 10/18/2022 at Unknown time  Yes Yes   Sig: Inject 18 Units under the skin daily with breakfast   insulin regular (HumuLIN R) 100 units/mL injection 10/18/2022 at Unknown time  Yes Yes   Sig: Inject 6 Units under the skin 3 (three) times a day before meals   metoprolol tartrate (LOPRESSOR) 25 mg tablet 10/18/2022 at Unknown time  Yes Yes   Sig: Take 12 5 mg by mouth 2 (two) times a day   midodrine (PROAMATINE) 10 MG tablet 10/18/2022 at Unknown time  Yes Yes   Sig: Take 10 mg by mouth 3 (three) times daily after meals   potassium chloride (Klor-Con) 10 mEq tablet 10/18/2022 at Unknown time  Yes Yes   Sig: Take 20 mEq by mouth 2 (two) times a day   risperiDONE (RisperDAL) 0 5 mg tablet 10/18/2022 at Unknown time  Yes Yes   Sig: Take 1 tablet by mouth 2 (two) times a day   sertraline (ZOLOFT) 100 mg tablet 10/18/2022 at Unknown time  Yes Yes   Sig: Take 100 mg by mouth daily      Facility-Administered Medications: None     Allergies: Allergies   Allergen Reactions   • Imipramine Other (See Comments)   • Lisinopril Other (See Comments)   • Paroxetine Other (See Comments)   • Penicillins Hives   • Rosiglitazone Other (See Comments)   • Troglitazone Other (See Comments)     ------------------------------------------------------------------------------------------------------------  Advance Directive and Living Will:      Power of :    POLST:    ------------------------------------------------------------------------------------------------------------  Anticipated Length of Stay is > 2 midnights    Care Time Delivered:   Upon my evaluation, this patient had a high probability of imminent or life-threatening deterioration due to Symptomatic hypoglycemia, which required my direct attention, intervention, and personal management  I have personally provided Thirty minutes (1055 to 1125) of critical care time, exclusive of procedures, teaching, family meetings, and any prior time recorded by providers other than myself  Eitan Kearney,         Portions of the record may have been created with voice recognition software  Occasional wrong word or "sound a like" substitutions may have occurred due to the inherent limitations of voice recognition software  Read the chart carefully and recognize, using context, where substitutions have occurred

## 2022-10-20 NOTE — QUICK NOTE
TT by nursing in regard to pts blood sugar being 62   pca went to give the pt and orange juice but he did not drink it so they rechk him and it was 459  Due to this discrepancy they rechkd another finger and he was 190  I asked them to get another glucometer and rechk for which that came back at 397  I asked that they not cover this at this time , asked how the pt was mentating  "kitty "crabby" and annoyed  I left a pt I was seeing and came up to see the pt, walked and in found pt at foot of bed laying horizontally across the bed  Neck hyperextended  Pt would open eyes but I called for help to reposition pt so neck not hyperextending  Another nurse and pca came in to help pt repositioned and very clammy asked for stat lab work   Cmp, ptt, cbc wth diff lactic and ammonia as well as stat abg ordered   Nursing came in to draw repeat blood sugar which was 110, at which point attending called came right up pt becoming more unresponsive at this time  Dextrose ordered stat in case blood sugar low    Rapid called please see rapid records : pt ultimately intubated and taken to MICU

## 2022-10-20 NOTE — WOUND OSTOMY CARE
Consult Note - Wound   [place] 74 y o  male MRN: [id number]  [facility] [id number]      History and Present Illness:  74year old male presented to the hospital with hypoglycemia  Patient had rapid response and was intubated for airway protection on 10/19/2022  Patient's history significant for DM  Assessment Findings:   Patient agreeable to assessment, assessed along with primary RN  On ventilator, NPO  Condom catheter in place, continent of stool  Patient is awake, nodding yes/no to questions, restrained  Bilateral buttocks, sacrum, and heels are intact without redness  Left dorsal foot brown eschar, well adhered  1   Present on admission evolving deep tissue pressure injury to bilateral ankles (suspect stage 3, 4, or unstageable when fully evolved)--unclear of what caused the wounds, possibly socks with lower extremity edema  Wounds are linear and circumferential   Light purple, beefy red, black, no drainage  Catie-wound intact with edema  See flowsheet for wound details  Wound Care Plan:   1-Hydraguard lotion to bilateral heels twice daily and as needed  2-Elevate heels off of bed/chair surface to offload pressure  3-Offloading air cushion in chair when out of bed, if able  4-Moisturize skin daily with skin nourishing cream   5-Turn/reposition every 2 hours while in bed using positioning wedges; and weight shift frequently while in chair for pressure re-distribution on skin  6-Apply Allevyn Life foam dressing to midline sacrum (small sacral) for prevention  [place] with P   Peel back at least daily for skin assessment and re-apply  Change dressing every 3 days and PRN  7-Left dorsal foot--apply 3m no sting skin prep to eschar daily  8-Bilateral ankles--cleanse with normal saline, pat dry  Apply Dermagran to wound and cover with ABD  Wrap with delmer  Change dressings every other day  Wound care team to follow    Plan of care reviewed with primary RN     Wound 10/20/22 Pressure Injury Ankle Anterior;Right (Active)   Wound Image    10/20/22 1122   Wound Description Black;Pink;Dry;Light purple 10/20/22 1122   Pressure Injury Stage DTPI 10/20/22 1122   Catie-wound Assessment Intact;Dry;Clean;Edema 10/20/22 1122   Wound Length (cm) 1 6 cm 10/20/22 1122   Wound Width (cm) 23 5 cm 10/20/22 1122   Wound Depth (cm) 0 1 cm 10/20/22 1122   Wound Surface Area (cm^2) 37 6 cm^2 10/20/22 1122   Wound Volume (cm^3) 3 76 cm^3 10/20/22 1122   Calculated Wound Volume (cm^3) 3 76 cm^3 10/20/22 1122   Drainage Amount None 10/20/22 1122   Treatments Cleansed;Elevated 10/20/22 1122   Dressing Dermagran gauze;Dry dressing 10/20/22 1122   Dressing Changed Changed 10/20/22 1122   Patient Tolerance Tolerated well 10/20/22 1122   Dressing Status Dry;Clean; Intact 10/20/22 1122       Wound 10/20/22 Pressure Injury Ankle Anterior; Left (Active)   Wound Image    10/20/22 1121   Wound Description Beefy red;Dry;Light purple 10/20/22 1121   Pressure Injury Stage DTPI 10/20/22 1121   Catie-wound Assessment Intact;Dry;Clean;Edema 10/20/22 1121   Wound Length (cm) 18 5 cm 10/20/22 1121   Wound Width (cm) 2 5 cm 10/20/22 1121   Wound Depth (cm) 0 1 cm 10/20/22 1121   Wound Surface Area (cm^2) 46 25 cm^2 10/20/22 1121   Wound Volume (cm^3) 4 625 cm^3 10/20/22 1121   Calculated Wound Volume (cm^3) 4 63 cm^3 10/20/22 1121   Drainage Amount None 10/20/22 1121   Treatments Cleansed;Elevated 10/20/22 1121   Dressing [place] gauze;Dry dressing 10/20/22 1121   Dressing Changed Changed 10/20/22 1121   Patient Tolerance Tolerated well 10/20/22 1121   Dressing Status Clean;Dry; Intact 10/20/22 [facility] BSN, RN, [employer]

## 2022-10-21 LAB
ANION GAP SERPL CALCULATED.3IONS-SCNC: 6 MMOL/L (ref 4–13)
BASOPHILS # BLD AUTO: 0.02 THOUSANDS/ÂΜL (ref 0–0.1)
BASOPHILS NFR BLD AUTO: 0 % (ref 0–1)
BUN SERPL-MCNC: 10 MG/DL (ref 5–25)
C PEPTIDE SERPL-MCNC: <0.1 NG/ML (ref 1.1–4.4)
CALCIUM SERPL-MCNC: 8.4 MG/DL (ref 8.3–10.1)
CHLORIDE SERPL-SCNC: 108 MMOL/L (ref 96–108)
CO2 SERPL-SCNC: 26 MMOL/L (ref 21–32)
CREAT SERPL-MCNC: 1 MG/DL (ref 0.6–1.3)
EOSINOPHIL # BLD AUTO: 0.24 THOUSAND/ÂΜL (ref 0–0.61)
EOSINOPHIL NFR BLD AUTO: 3 % (ref 0–6)
ERYTHROCYTE [DISTWIDTH] IN BLOOD BY AUTOMATED COUNT: 13.2 % (ref 11.6–15.1)
FOLATE SERPL-MCNC: 17.2 NG/ML (ref 3.1–17.5)
GFR SERPL CREATININE-BSD FRML MDRD: 78 ML/MIN/1.73SQ M
GLUCOSE SERPL-MCNC: 186 MG/DL (ref 65–140)
GLUCOSE SERPL-MCNC: 200 MG/DL (ref 65–140)
GLUCOSE SERPL-MCNC: 208 MG/DL (ref 65–140)
GLUCOSE SERPL-MCNC: 224 MG/DL (ref 65–140)
GLUCOSE SERPL-MCNC: 237 MG/DL (ref 65–140)
GLUCOSE SERPL-MCNC: 327 MG/DL (ref 65–140)
HCT VFR BLD AUTO: 26.6 % (ref 36.5–49.3)
HGB BLD-MCNC: 8.5 G/DL (ref 12–17)
IMM GRANULOCYTES # BLD AUTO: 0.03 THOUSAND/UL (ref 0–0.2)
IMM GRANULOCYTES NFR BLD AUTO: 0 % (ref 0–2)
LYMPHOCYTES # BLD AUTO: 1.76 THOUSANDS/ÂΜL (ref 0.6–4.47)
LYMPHOCYTES NFR BLD AUTO: 22 % (ref 14–44)
MAGNESIUM SERPL-MCNC: 1.7 MG/DL (ref 1.6–2.6)
MCH RBC QN AUTO: 30.9 PG (ref 26.8–34.3)
MCHC RBC AUTO-ENTMCNC: 32 G/DL (ref 31.4–37.4)
MCV RBC AUTO: 97 FL (ref 82–98)
MONOCYTES # BLD AUTO: 1.14 THOUSAND/ÂΜL (ref 0.17–1.22)
MONOCYTES NFR BLD AUTO: 14 % (ref 4–12)
NEUTROPHILS # BLD AUTO: 4.92 THOUSANDS/ÂΜL (ref 1.85–7.62)
NEUTS SEG NFR BLD AUTO: 61 % (ref 43–75)
NRBC BLD AUTO-RTO: 0 /100 WBCS
PHOSPHATE SERPL-MCNC: 3.2 MG/DL (ref 2.3–4.1)
PLATELET # BLD AUTO: 173 THOUSANDS/UL (ref 149–390)
PMV BLD AUTO: 11.6 FL (ref 8.9–12.7)
POTASSIUM SERPL-SCNC: 3.8 MMOL/L (ref 3.5–5.3)
RBC # BLD AUTO: 2.75 MILLION/UL (ref 3.88–5.62)
SODIUM SERPL-SCNC: 140 MMOL/L (ref 135–147)
VIT B12 SERPL-MCNC: 144 PG/ML (ref 100–900)
WBC # BLD AUTO: 8.11 THOUSAND/UL (ref 4.31–10.16)

## 2022-10-21 PROCEDURE — 82746 ASSAY OF FOLIC ACID SERUM: CPT | Performed by: INTERNAL MEDICINE

## 2022-10-21 PROCEDURE — 83735 ASSAY OF MAGNESIUM: CPT | Performed by: INTERNAL MEDICINE

## 2022-10-21 PROCEDURE — 82948 REAGENT STRIP/BLOOD GLUCOSE: CPT

## 2022-10-21 PROCEDURE — 80048 BASIC METABOLIC PNL TOTAL CA: CPT | Performed by: INTERNAL MEDICINE

## 2022-10-21 PROCEDURE — 85025 COMPLETE CBC W/AUTO DIFF WBC: CPT | Performed by: INTERNAL MEDICINE

## 2022-10-21 PROCEDURE — 82607 VITAMIN B-12: CPT | Performed by: INTERNAL MEDICINE

## 2022-10-21 PROCEDURE — 84100 ASSAY OF PHOSPHORUS: CPT | Performed by: INTERNAL MEDICINE

## 2022-10-21 PROCEDURE — 99232 SBSQ HOSP IP/OBS MODERATE 35: CPT | Performed by: NURSE PRACTITIONER

## 2022-10-21 RX ORDER — INSULIN GLARGINE 100 [IU]/ML
5 INJECTION, SOLUTION SUBCUTANEOUS
Qty: 1.5 ML | Refills: 0 | Status: SHIPPED | OUTPATIENT
Start: 2022-10-21 | End: 2022-10-26

## 2022-10-21 RX ORDER — PEN NEEDLE, DIABETIC 32GX 5/32"
NEEDLE, DISPOSABLE MISCELLANEOUS
Qty: 100 EACH | Refills: 0 | Status: SHIPPED | OUTPATIENT
Start: 2022-10-21 | End: 2022-10-26

## 2022-10-21 RX ORDER — INSULIN ASPART 100 [IU]/ML
2 INJECTION, SOLUTION INTRAVENOUS; SUBCUTANEOUS
Qty: 1.8 ML | Refills: 0 | Status: SHIPPED | OUTPATIENT
Start: 2022-10-21 | End: 2022-10-22 | Stop reason: SDUPTHER

## 2022-10-21 RX ADMIN — MIDODRINE HYDROCHLORIDE 10 MG: 5 TABLET ORAL at 12:14

## 2022-10-21 RX ADMIN — MIDODRINE HYDROCHLORIDE 10 MG: 5 TABLET ORAL at 17:28

## 2022-10-21 RX ADMIN — RISPERIDONE 0.5 MG: 0.25 TABLET ORAL at 17:27

## 2022-10-21 RX ADMIN — ASPIRIN 81 MG CHEWABLE TABLET 81 MG: 81 TABLET CHEWABLE at 08:32

## 2022-10-21 RX ADMIN — RISPERIDONE 0.5 MG: 0.25 TABLET ORAL at 08:32

## 2022-10-21 RX ADMIN — INSULIN LISPRO 1 UNITS: 100 INJECTION, SOLUTION INTRAVENOUS; SUBCUTANEOUS at 12:14

## 2022-10-21 RX ADMIN — CHLORHEXIDINE GLUCONATE 15 ML: 1.2 SOLUTION ORAL at 21:25

## 2022-10-21 RX ADMIN — Medication 12.5 MG: at 17:27

## 2022-10-21 RX ADMIN — INSULIN LISPRO 1 UNITS: 100 INJECTION, SOLUTION INTRAVENOUS; SUBCUTANEOUS at 08:33

## 2022-10-21 RX ADMIN — APIXABAN 5 MG: 5 TABLET, FILM COATED ORAL at 17:28

## 2022-10-21 RX ADMIN — HYDROCORTISONE 10 MG: 10 TABLET ORAL at 08:36

## 2022-10-21 RX ADMIN — SERTRALINE 100 MG: 100 TABLET, FILM COATED ORAL at 08:32

## 2022-10-21 RX ADMIN — Medication 12.5 MG: at 08:32

## 2022-10-21 RX ADMIN — CHLORHEXIDINE GLUCONATE 15 ML: 1.2 SOLUTION ORAL at 08:32

## 2022-10-21 RX ADMIN — INSULIN GLARGINE 2 UNITS: 100 INJECTION, SOLUTION SUBCUTANEOUS at 17:35

## 2022-10-21 RX ADMIN — APIXABAN 5 MG: 5 TABLET, FILM COATED ORAL at 08:32

## 2022-10-21 RX ADMIN — ATORVASTATIN CALCIUM 10 MG: 10 TABLET, FILM COATED ORAL at 17:28

## 2022-10-21 RX ADMIN — FLUDROCORTISONE ACETATE 0.2 MG: 0.1 TABLET ORAL at 08:36

## 2022-10-21 RX ADMIN — HYDROCORTISONE 10 MG: 10 TABLET ORAL at 17:33

## 2022-10-21 RX ADMIN — MIDODRINE HYDROCHLORIDE 10 MG: 5 TABLET ORAL at 08:32

## 2022-10-21 RX ADMIN — INSULIN LISPRO 1 UNITS: 100 INJECTION, SOLUTION INTRAVENOUS; SUBCUTANEOUS at 17:32

## 2022-10-21 NOTE — ASSESSMENT & PLAN NOTE
Lab Results   Component Value Date    HGBA1C 7 5 (H) 07/22/2022     · Home insulin regimen Home regimen insulin NPH 18 units with breakfast, 2 units at bedtime, regular insulin 10 units with breakfast and dinner    · Plan to changed to basal/bolus regimen on discharge  · Plan as above

## 2022-10-21 NOTE — PLAN OF CARE
Problem: Potential for Falls  Goal: Patient will remain free of falls  Description: INTERVENTIONS:  - Educate patient/family on patient safety including physical limitations  - Instruct patient to call for assistance with activity   - Consult OT/PT to assist with strengthening/mobility   - Keep Call bell within reach  - Keep bed low and locked with side rails adjusted as appropriate  - Keep care items and personal belongings within reach  - Initiate and maintain comfort rounds  - Make Fall Risk Sign visible to staff  - Offer Toileting every  Hours, in advance of need  - Initiate/Maintain alarm  - Obtain necessary fall risk management equipment:   - Apply yellow socks and bracelet for high fall risk patients  - Consider moving patient to room near nurses station  Outcome: Progressing     Problem: PAIN - ADULT  Goal: Verbalizes/displays adequate comfort level or baseline comfort level  Description: Interventions:  - Encourage patient to monitor pain and request assistance  - Assess pain using appropriate pain scale  - Administer analgesics based on type and severity of pain and evaluate response  - Implement non-pharmacological measures as appropriate and evaluate response  - Consider cultural and social influences on pain and pain management  - Notify physician/advanced practitioner if interventions unsuccessful or patient reports new pain  Outcome: Progressing     Problem: INFECTION - ADULT  Goal: Absence or prevention of progression during hospitalization  Description: INTERVENTIONS:  - Assess and monitor for signs and symptoms of infection  - Monitor lab/diagnostic results  - Monitor all insertion sites, i e  indwelling lines, tubes, and drains  - Monitor endotracheal if appropriate and nasal secretions for changes in amount and color  - Mershon appropriate cooling/warming therapies per order  - Administer medications as ordered  - Instruct and encourage patient and family to use good hand hygiene technique  - Identify and instruct in appropriate isolation precautions for identified infection/condition  Outcome: Progressing  Goal: Absence of fever/infection during neutropenic period  Description: INTERVENTIONS:  - Monitor WBC    Outcome: Progressing     Problem: SAFETY ADULT  Goal: Patient will remain free of falls  Description: INTERVENTIONS:  - Educate patient/family on patient safety including physical limitations  - Instruct patient to call for assistance with activity   - Consult OT/PT to assist with strengthening/mobility   - Keep Call bell within reach  - Keep bed low and locked with side rails adjusted as appropriate  - Keep care items and personal belongings within reach  - Initiate and maintain comfort rounds  - Make Fall Risk Sign visible to staff  - Offer Toileting every  Hours, in advance of need  - Initiate/Maintain alarm  - Obtain necessary fall risk management equipment:   - Apply yellow socks and bracelet for high fall risk patients  - Consider moving patient to room near nurses station  Outcome: Progressing  Goal: Maintain or return to baseline ADL function  Description: INTERVENTIONS:  -  Assess patient's ability to carry out ADLs; assess patient's baseline for ADL function and identify physical deficits which impact ability to perform ADLs (bathing, care of mouth/teeth, toileting, grooming, dressing, etc )  - Assess/evaluate cause of self-care deficits   - Assess range of motion  - Assess patient's mobility; develop plan if impaired  - Assess patient's need for assistive devices and provide as appropriate  - Encourage maximum independence but intervene and supervise when necessary  - Involve family in performance of ADLs  - Assess for home care needs following discharge   - Consider OT consult to assist with ADL evaluation and planning for discharge  - Provide patient education as appropriate  Outcome: Progressing  Goal: Maintains/Returns to pre admission functional level  Description: INTERVENTIONS:  - Perform BMAT or MOVE assessment daily    - Set and communicate daily mobility goal to care team and patient/family/caregiver  - Collaborate with rehabilitation services on mobility goals if consulted  - Perform Range of Motion  times a day  - Reposition patient every  hours  - Dangle patient  times a day  - Stand patient  times a day  - Ambulate patient  times a day  - Out of bed to chair  times a day   - Out of bed for meals  times a day  - Out of bed for toileting  - Record patient progress and toleration of activity level   Outcome: Progressing     Problem: DISCHARGE PLANNING  Goal: Discharge to home or other facility with appropriate resources  Description: INTERVENTIONS:  - Identify barriers to discharge w/patient and caregiver  - Arrange for needed discharge resources and transportation as appropriate  - Identify discharge learning needs (meds, wound care, etc )  - Arrange for interpretive services to assist at discharge as needed  - Refer to Case Management Department for coordinating discharge planning if the patient needs post-hospital services based on physician/advanced practitioner order or complex needs related to functional status, cognitive ability, or social support system  Outcome: Progressing     Problem: Knowledge Deficit  Goal: Patient/family/caregiver demonstrates understanding of disease process, treatment plan, medications, and discharge instructions  Description: Complete learning assessment and assess knowledge base  Interventions:  - Provide teaching at level of understanding  - Provide teaching via preferred learning methods  Outcome: Progressing     Problem: Nutrition/Hydration-ADULT  Goal: Nutrient/Hydration intake appropriate for improving, restoring or maintaining nutritional needs  Description: Monitor and assess patient's nutrition/hydration status for malnutrition  Collaborate with interdisciplinary team and initiate plan and interventions as ordered    Monitor patient's weight and dietary intake as ordered or per policy  Utilize nutrition screening tool and intervene as necessary  Determine patient's food preferences and provide high-protein, high-caloric foods as appropriate       INTERVENTIONS:  - Monitor oral intake, urinary output, labs, and treatment plans  - Assess nutrition and hydration status and recommend course of action  - Evaluate amount of meals eaten  - Assist patient with eating if necessary   - Allow adequate time for meals  - Recommend/ encourage appropriate diets, oral nutritional supplements, and vitamin/mineral supplements  - Order, calculate, and assess calorie counts as needed  - Recommend, monitor, and adjust tube feedings and TPN/PPN based on assessed needs  - Assess need for intravenous fluids  - Provide specific nutrition/hydration education as appropriate  - Include patient/family/caregiver in decisions related to nutrition  Outcome: Progressing     Problem: MOBILITY - ADULT  Goal: Maintain or return to baseline ADL function  Description: INTERVENTIONS:  -  Assess patient's ability to carry out ADLs; assess patient's baseline for ADL function and identify physical deficits which impact ability to perform ADLs (bathing, care of mouth/teeth, toileting, grooming, dressing, etc )  - Assess/evaluate cause of self-care deficits   - Assess range of motion  - Assess patient's mobility; develop plan if impaired  - Assess patient's need for assistive devices and provide as appropriate  - Encourage maximum independence but intervene and supervise when necessary  - Involve family in performance of ADLs  - Assess for home care needs following discharge   - Consider OT consult to assist with ADL evaluation and planning for discharge  - Provide patient education as appropriate  Outcome: Progressing  Goal: Maintains/Returns to pre admission functional level  Description: INTERVENTIONS:  - Perform BMAT or MOVE assessment daily    - Set and communicate daily mobility goal to care team and patient/family/caregiver  - Collaborate with rehabilitation services on mobility goals if consulted  - Perform Range of Motion  times a day  - Reposition patient every  hours    - Dangle patient  times a day  - Stand patient  times a day  - Ambulate patient  times a day  - Out of bed to chair  times a day   - Out of bed for meals  times a day  - Out of bed for toileting  - Record patient progress and toleration of activity level   Outcome: Progressing     Problem: Prexisting or High Potential for Compromised Skin Integrity  Goal: Skin integrity is maintained or improved  Description: INTERVENTIONS:  - Identify patients at risk for skin breakdown  - Assess and monitor skin integrity  - Assess and monitor nutrition and hydration status  - Monitor labs   - Assess for incontinence   - Turn and reposition patient  - Assist with mobility/ambulation  - Relieve pressure over bony prominences  - Avoid friction and shearing  - Provide appropriate hygiene as needed including keeping skin clean and dry  - Evaluate need for skin moisturizer/barrier cream  - Collaborate with interdisciplinary team   - Patient/family teaching  - Consider wound care consult   Outcome: Progressing

## 2022-10-21 NOTE — CASE MANAGEMENT
Case Management Discharge Planning Note    Patient name Miguel Gloria  Location [facility] 629/[facility] 532-10 MRN [id number]  : 1955 Date 10/21/2022       Current Admission Date: 10/19/2022  Current Admission Diagnosis:Recurrent hypoglycemia   Patient Active Problem List    Diagnosis Date Noted   • Recurrent hypoglycemia 10/19/2022   • Insulin dependent type 1 diabetes mellitus  10/19/2022   • Hypokalemia 10/19/2022   • Adrenal insufficiency  10/19/2022   • Essential hypertension 10/19/2022   • Paroxysmal atrial fibrillation  10/19/2022   • History of stroke 10/19/2022   • Anemia of chronic disease 10/19/2022   • Hyperlipidemia 10/19/2022   • Psychiatric disorder 10/19/2022      LOS (days): 2  Geometric Mean LOS (GMLOS) (days): 3 90  Days to GMLOS:1 7     OBJECTIVE:  Risk of Unplanned Readmission Score: 18 01         Current admission status: Inpatient   Preferred Pharmacy:   [address], [place] [address]  9 [facility] [address]  Phone: [phone] Fax: Ry Bejarano [place] [address] [facility] 38 210 [facility]  Phone: [phone] Fax: [phone]    Primary Care Provider: Christa Hensley MD    Primary Insurance: [address] [place] [address]  Secondary Insurance:     DISCHARGE DETAILS:    Discharge planning discussed with[de-identified] Patient, sister and S/O at bedside  Diller of Choice: Yes  Comments - Freedom of Choice: Discussed FOC  CM contacted family/caregiver?: Yes    Other Referral/Resources/Interventions Provided:  Referral Comments: [facility]- 126 16 for novolog and 96 01 for lantus  Patient agreeable if medically necessary, requesting referral to [facility] if needs rehab    PT/OT pending, but [facility] [address]     TC to pharmacy, Taisha Douglas, who states that there is a generic novolog that is approved by patient's insurance that would be 63 [phone], insurance does not approve a generic of lantus, therefore, price would remain 96 01

## 2022-10-21 NOTE — RESPIRATORY THERAPY NOTE
Resp Care   10/21/22 1103   Respiratory Protocol   Protocol Initiated? Yes   Protocol Selection Respiratory; Airway Clearance   Language Barrier? No   Medical & Social History Reviewed? Yes   Diagnostic Studies Reviewed? Yes   Physical Assessment Performed? Yes   Respiratory Plan Discontinue Protocol   Airway Clearance Plan Flutter   Respiratory Assessment   Assessment Type Assess only   General Appearance Alert; Awake   Respiratory Pattern Normal   Chest Assessment Chest expansion symmetrical   Bilateral Breath Sounds Diminished;Coarse   Cough Non-productive;Moist   Resp Comments Pt offers no resp complaints, pt comfortable on RA Sat 95%  Pt has wet nonproductive cough and and says that he is not able to bring up anything when he coughs  Will dc resp protocol asit was ordered for vent and order ACP with a flutter device to help to loosen secretions  Pt instructed on flutter use     O2 Device RA

## 2022-10-21 NOTE — ASSESSMENT & PLAN NOTE
· Blood pressure acceptable  Continue Lopressor which he is on for history of PAF    · Continue midodrine

## 2022-10-21 NOTE — ASSESSMENT & PLAN NOTE
Type 1 diabetic presented with encephalopathy in setting of hypoglycemia, required glucose from EMS  Was a rapid response 10/19 and required intubation secondary to symptomatic hypoglycemia/altered mental status  Blood sugar was 30, i-STAT less than 20  Subsequently extubated and transferred out of ICU  · Endocrinology following, insulin per endocrinology    · Has Dexcom outpatient  · Diabetic diet  · Monitor Accu-Cheks closely

## 2022-10-21 NOTE — CASE MANAGEMENT
Case Management Progress Note    Patient name Skye Polanco  [facility] MRN [id number]  : 1955 Date 10/21/2022       LOS (days): 2  Geometric Mean LOS (GMLOS) (days): 3 90  Days to GMLOS:1 8        OBJECTIVE:        Current admission status: Inpatient  Preferred Pharmacy:   [address], [address] [facility]  Phone: [phone] Fax: Ry Bejarano [place] [address] [place] [address] [facility] [address] [facility] [address] [place] [address] [facility] 77 [address]  Phone: [phone] Fax: [phone]    Primary Care Provider: Izzy Estrada MD    Primary Insurance: [address] [place] [address]  Secondary Insurance:     PROGRESS NOTE: TC to [facility] pharmacy, lantus is 96 01, novolog 126  16   Per Madiha Hurley, patient is currently in his medicare "coverage gap", most likely will have no copay after a cap is reached

## 2022-10-21 NOTE — PROGRESS NOTES
Reny Stevens 1481 1955, 74 y o  male MRN: [id number]  Unit/Bed#: [facility] 629-01 Encounter: [id number]  Primary Care Provider: Izzy Estrada MD   Date and time admitted to hospital: 10/19/2022  6:33 AM    * Recurrent hypoglycemia  Assessment & Plan  Type 1 diabetic presented with encephalopathy in setting of hypoglycemia, required glucose from EMS  Was a rapid response 10/19 and required intubation secondary to symptomatic hypoglycemia/altered mental status  Blood sugar was 30, i-STAT less than 20  Subsequently extubated and transferred out of ICU  · Endocrinology following, insulin per endocrinology  · Has Dexcom outpatient  · Diabetic diet  · Monitor Accu-Cheks closely    Insulin dependent type 1 diabetes mellitus   Assessment & Plan  Lab Results   Component Value Date    HGBA1C 7 5 (H) 07/22/2022     · Home insulin regimen Home regimen insulin NPH 18 units with breakfast, 2 units at bedtime, regular insulin 10 units with breakfast and dinner  · Plan to changed to basal/bolus regimen on discharge  · Plan as above    Hypokalemia  Assessment & Plan  · Monitor/replete serum potassium and magnesium deficiencies as present    Adrenal insufficiency   Assessment & Plan  · Continue Florinef/Cortef regimen   · Also on midodrine  · Endocrinology following    Essential hypertension  Assessment & Plan  · Blood pressure acceptable  Continue Lopressor which he is on for history of PAF  · Continue midodrine    Paroxysmal atrial fibrillation   Assessment & Plan  · Continue Lopressor and Eliquis for anticoagulation  History of stroke  Assessment & Plan  · Continue ASA/Eliquis/statin    Anemia of chronic disease  Assessment & Plan  · Chronic and stable      Hyperlipidemia  Assessment & Plan  · Continue statin    Psychiatric disorder  Assessment & Plan  · Continue Risperdal/Zoloft home regimen      VTE Pharmacologic Prophylaxis: VTE Score: 2 Moderate Risk (Score 3-4) - Pharmacological DVT Prophylaxis Ordered: apixaban (Eliquis)  Patient Centered Rounds: I performed bedside rounds with nursing staff today  Discussions with Specialists or Other Care Team Provider: nursing, case management     Education and Discussions with Family / Patient: Attempted to update  (daughter) via phone  Left voicemail  Time Spent for Care: 30 minutes  More than 50% of total time spent on counseling and coordination of care as described above  Current Length of Stay: 2 day(s)  Current Patient Status: Inpatient     Certification Statement: The patient will continue to require additional inpatient hospital stay due to monitoring BG levels     Discharge Plan: Anticipate discharge in 24-48 hrs to home  Code Status: Level 1 - Full Code    Subjective:   Patient offers no acute complaints  Objective:     Vitals:   Temp (24hrs), Av °F (37 8 °C), Min:99 4 °F (37 4 °C), Max:100 5 °F (38 1 °C)    Temp:  [99 4 °F (37 4 °C)-100 5 °F (38 1 °C)] 100 5 °F (38 1 °C)  HR:  [70-82] 70  Resp:  [10-27] 16  BP: (103-149)/(37-93) 148/91  SpO2:  [95 %-100 %] 95 %  Body mass index is 34 43 kg/m²  Input and Output Summary (last 24 hours): Intake/Output Summary (Last 24 hours) at 10/21/2022 1012  Last data filed at 10/21/2022 0500  Gross per 24 hour   Intake --   Output 2200 ml   Net -2200 ml       Physical Exam:   Physical Exam  Vitals and nursing note reviewed  Constitutional:       Appearance: He is obese  Cardiovascular:      Rate and Rhythm: Normal rate  Pulmonary:      Breath sounds: Normal breath sounds  Abdominal:      Tenderness: There is no abdominal tenderness  Musculoskeletal:         General: No swelling  Skin:     General: Skin is warm  Neurological:      Mental Status: He is alert and oriented to person, place, and time  Mental status is at baseline  Psychiatric:         Mood and Affect: Affect is flat  Speech: Speech is slurred  Additional Data:     Labs:  Results from last 7 days   Lab Units 10/21/22  0517   WBC Thousand/uL 8 11   HEMOGLOBIN g/dL 8 5*   HEMATOCRIT % 26 6*   PLATELETS Thousands/uL 173   NEUTROS PCT % 61   LYMPHS PCT % 22   MONOS PCT % 14*   EOS PCT % 3     Results from last 7 days   Lab Units 10/21/22  0517 10/20/22  0237 10/20/22  0024   SODIUM mmol/L 140   < > 141   POTASSIUM mmol/L 3 8   < > 3 7   CHLORIDE mmol/L 108   < > 111*   CO2 mmol/L 26   < > 29   BUN mg/dL 10   < > 9   CREATININE mg/dL 1 00   < > 1 10   ANION GAP mmol/L 6   < > 1*   CALCIUM mg/dL 8 4   < > 8 1*   ALBUMIN g/dL  --   --  2 7*   TOTAL BILIRUBIN mg/dL  --   --  0 45   ALK PHOS U/L  --   --  64   ALT U/L  --   --  15   AST U/L  --   --  7   GLUCOSE RANDOM mg/dL 200*   < > 27*    < > = values in this interval not displayed  Results from last 7 days   Lab Units 10/21/22  0749 10/21/22  0206 10/20/22  2056 10/20/22  1723 10/20/22  1501 10/20/22  1203 10/20/22  1046 10/20/22  0742 10/20/22  0502 10/20/22  0217 10/20/22  0132 10/20/22  0023   POC GLUCOSE mg/dl 186* 224* 231* 158* 136 124 155* 113 101 97 116 153*         Results from last 7 days   Lab Units 10/19/22  2208   LACTIC ACID mmol/L 1 3       Lines/Drains:  Invasive Devices  Report    Peripheral Intravenous Line  Duration           Peripheral IV 10/19/22 Left Antecubital 2 days    Peripheral IV 10/20/22 Distal;Left;Upper;Ventral (anterior) Arm 1 day    Peripheral IV 10/20/22 Right;Upper;Ventral (anterior) Arm 1 day          Drain  Duration           External Urinary Catheter Medium 1 day                      Imaging: No pertinent imaging reviewed      Recent Cultures (last 7 days):         Last 24 Hours Medication List:   Current Facility-Administered Medications   Medication Dose Route Frequency Provider Last Rate   • acetaminophen  650 mg Oral Q6H PRN Hetul Red, DO     • apixaban  5 mg Oral BID Hetul Red, DO     • aspirin  81 mg Oral Daily Opal Red DO     • atorvastatin  10 mg Oral Daily With [employer], DO     • chlorhexidine  15 mL Mouth/Throat Q12H [facility] Hetul Red, DO     • fludrocortisone  0 2 mg Oral Daily Hetrodríguez Peñahta, DO     • hydrocortisone  10 mg Oral BID Hetrodríguez Peñahta, DO     • insulin glargine  2 Units Subcutaneous Q24H Hetul Red, DO     • insulin lispro  1-5 Units Subcutaneous TID AC Hetrodríguez Munoza, DO     • metoprolol tartrate  12 5 mg Oral BID Opal Red, DO     • midodrine  10 mg Oral TID after meals Hetrodríguez Peñahta, DO     • risperiDONE  0 5 mg Oral BID Opal Red, DO     • sertraline  100 mg Oral Daily Opal Red, DO          Today, Patient Was Seen By: Mort Hamman    **Please Note: This note may have been constructed using a voice recognition system  **

## 2022-10-21 NOTE — PLAN OF CARE
Problem: Potential for Falls  Goal: Patient will remain free of falls  Description: INTERVENTIONS:  - Educate patient/family on patient safety including physical limitations  - Instruct patient to call for assistance with activity   - Consult OT/PT to assist with strengthening/mobility   - Keep Call bell within reach  - Keep bed low and locked with side rails adjusted as appropriate  - Keep care items and personal belongings within reach  - Initiate and maintain comfort rounds  - Make Fall Risk Sign visible to staff  - Offer Toileting   - Apply yellow socks and bracelet for high fall risk patients  - Consider moving patient to room near nurses station  Outcome: Progressing     Problem: PAIN - ADULT  Goal: Verbalizes/displays adequate comfort level or baseline comfort level  Description: Interventions:  - Encourage patient to monitor pain and request assistance  - Assess pain using appropriate pain scale  - Administer analgesics based on type and severity of pain and evaluate response  - Implement non-pharmacological measures as appropriate and evaluate response  - Consider cultural and social influences on pain and pain management  - Notify physician/advanced practitioner if interventions unsuccessful or patient reports new pain  Outcome: Progressing     Problem: INFECTION - ADULT  Goal: Absence or prevention of progression during hospitalization  Description: INTERVENTIONS:  - Assess and monitor for signs and symptoms of infection  - Monitor lab/diagnostic results  - Monitor all insertion sites, i e  indwelling lines, tubes, and drains  - Monitor endotracheal if appropriate and nasal secretions for changes in amount and color  - Dalton appropriate cooling/warming therapies per order  - Administer medications as ordered  - Instruct and encourage patient and family to use good hand hygiene technique  - Identify and instruct in appropriate isolation precautions for identified infection/condition  Outcome: Progressing  Goal: Absence of fever/infection during neutropenic period  Description: INTERVENTIONS:  - Monitor WBC    Outcome: Progressing     Problem: SAFETY ADULT  Goal: Patient will remain free of falls  Description: INTERVENTIONS:  - Educate patient/family on patient safety including physical limitations  - Instruct patient to call for assistance with activity   - Consult OT/PT to assist with strengthening/mobility   - Keep Call bell within reach  - Keep bed low and locked with side rails adjusted as appropriate  - Keep care items and personal belongings within reach  - Initiate and maintain comfort rounds  - Make Fall Risk Sign visible to staff  - Offer Toileting  - Apply yellow socks and bracelet for high fall risk patients  - Consider moving patient to room near nurses station  Outcome: Progressing  Goal: Maintain or return to baseline ADL function  Description: INTERVENTIONS:  -  Assess patient's ability to carry out ADLs; assess patient's baseline for ADL function and identify physical deficits which impact ability to perform ADLs (bathing, care of mouth/teeth, toileting, grooming, dressing, etc )  - Assess/evaluate cause of self-care deficits   - Assess range of motion  - Assess patient's mobility; develop plan if impaired  - Assess patient's need for assistive devices and provide as appropriate  - Encourage maximum independence but intervene and supervise when necessary  - Involve family in performance of ADLs  - Assess for home care needs following discharge   - Consider OT consult to assist with ADL evaluation and planning for discharge  - Provide patient education as appropriate  Outcome: Progressing  Goal: Maintains/Returns to pre admission functional level  Description: INTERVENTIONS:  - Perform BMAT or MOVE assessment daily    - Set and communicate daily mobility goal to care team and patient/family/caregiver     - Collaborate with rehabilitation services on mobility goals if consulted  - Out of bed for toileting  - Record patient progress and toleration of activity level   Outcome: Progressing     Problem: DISCHARGE PLANNING  Goal: Discharge to home or other facility with appropriate resources  Description: INTERVENTIONS:  - Identify barriers to discharge w/patient and caregiver  - Arrange for needed discharge resources and transportation as appropriate  - Identify discharge learning needs (meds, wound care, etc )  - Arrange for interpretive services to assist at discharge as needed  - Refer to Case Management Department for coordinating discharge planning if the patient needs post-hospital services based on physician/advanced practitioner order or complex needs related to functional status, cognitive ability, or social support system  Outcome: Progressing     Problem: Knowledge Deficit  Goal: Patient/family/caregiver demonstrates understanding of disease process, treatment plan, medications, and discharge instructions  Description: Complete learning assessment and assess knowledge base  Interventions:  - Provide teaching at level of understanding  - Provide teaching via preferred learning methods  Outcome: Progressing     Problem: Nutrition/Hydration-ADULT  Goal: Nutrient/Hydration intake appropriate for improving, restoring or maintaining nutritional needs  Description: Monitor and assess patient's nutrition/hydration status for malnutrition  Collaborate with interdisciplinary team and initiate plan and interventions as ordered  Monitor patient's weight and dietary intake as ordered or per policy  Utilize nutrition screening tool and intervene as necessary  Determine patient's food preferences and provide high-protein, high-caloric foods as appropriate       INTERVENTIONS:  - Monitor oral intake, urinary output, labs, and treatment plans  - Assess nutrition and hydration status and recommend course of action  - Evaluate amount of meals eaten  - Assist patient with eating if necessary   - Allow adequate time for meals  - Recommend/ encourage appropriate diets, oral nutritional supplements, and vitamin/mineral supplements  - Order, calculate, and assess calorie counts as needed  - Recommend, monitor, and adjust tube feedings and TPN/PPN based on assessed needs  - Assess need for intravenous fluids  - Provide specific nutrition/hydration education as appropriate  - Include patient/family/caregiver in decisions related to nutrition  Outcome: Progressing     Problem: MOBILITY - ADULT  Goal: Maintain or return to baseline ADL function  Description: INTERVENTIONS:  -  Assess patient's ability to carry out ADLs; assess patient's baseline for ADL function and identify physical deficits which impact ability to perform ADLs (bathing, care of mouth/teeth, toileting, grooming, dressing, etc )  - Assess/evaluate cause of self-care deficits   - Assess range of motion  - Assess patient's mobility; develop plan if impaired  - Assess patient's need for assistive devices and provide as appropriate  - Encourage maximum independence but intervene and supervise when necessary  - Involve family in performance of ADLs  - Assess for home care needs following discharge   - Consider OT consult to assist with ADL evaluation and planning for discharge  - Provide patient education as appropriate  Outcome: Progressing  Goal: Maintains/Returns to pre admission functional level  Description: INTERVENTIONS:  - Perform BMAT or MOVE assessment daily    - Set and communicate daily mobility goal to care team and patient/family/caregiver     - Collaborate with rehabilitation services on mobility goals if consulted  - Out of bed for toileting  - Record patient progress and toleration of activity level   Outcome: Progressing     Problem: Prexisting or High Potential for Compromised Skin Integrity  Goal: Skin integrity is maintained or improved  Description: INTERVENTIONS:  - Identify patients at risk for skin breakdown  - Assess and monitor skin integrity  - Assess and monitor nutrition and hydration status  - Monitor labs   - Assess for incontinence   - Turn and reposition patient  - Assist with mobility/ambulation  - Relieve pressure over bony prominences  - Avoid friction and shearing  - Provide appropriate hygiene as needed including keeping skin clean and dry  - Evaluate need for skin moisturizer/barrier cream  - Collaborate with interdisciplinary team   - Patient/family teaching  - Consider wound care consult   Outcome: Progressing

## 2022-10-21 NOTE — RESPIRATORY THERAPY NOTE
10/20/22 2053   Respiratory Assessment   Assessment Type Assess only   General Appearance Alert; Awake   Respiratory Pattern Normal   Chest Assessment Chest expansion symmetrical   Bilateral Breath Sounds Diminished;Coarse   Cough None   Resp Comments Patient extubated earlier today, currently resting in bed on nasal cannula 3 L/min  He offers no respiratory complaints at this time  Will continue to follow     Oxygen Therapy/Pulse Ox   O2 Device Nasal cannula   O2 Therapy Oxygen   Nasal Cannula O2 Flow Rate (L/min) 3 L/min   Calculated FIO2 (%) - Nasal Cannula 32   SpO2 Activity At Rest   $ Pulse Oximetry Spot Check Charge Completed

## 2022-10-22 LAB
GLUCOSE SERPL-MCNC: 275 MG/DL (ref 65–140)
GLUCOSE SERPL-MCNC: 299 MG/DL (ref 65–140)
GLUCOSE SERPL-MCNC: 325 MG/DL (ref 65–140)
GLUCOSE SERPL-MCNC: 325 MG/DL (ref 65–140)
GLUCOSE SERPL-MCNC: 340 MG/DL (ref 65–140)

## 2022-10-22 PROCEDURE — 82948 REAGENT STRIP/BLOOD GLUCOSE: CPT

## 2022-10-22 PROCEDURE — 97163 PT EVAL HIGH COMPLEX 45 MIN: CPT

## 2022-10-22 PROCEDURE — 99232 SBSQ HOSP IP/OBS MODERATE 35: CPT | Performed by: INTERNAL MEDICINE

## 2022-10-22 PROCEDURE — 99232 SBSQ HOSP IP/OBS MODERATE 35: CPT | Performed by: NURSE PRACTITIONER

## 2022-10-22 PROCEDURE — 97167 OT EVAL HIGH COMPLEX 60 MIN: CPT

## 2022-10-22 RX ORDER — INSULIN LISPRO 100 [IU]/ML
1-5 INJECTION, SOLUTION INTRAVENOUS; SUBCUTANEOUS
Status: DISCONTINUED | OUTPATIENT
Start: 2022-10-22 | End: 2022-10-25

## 2022-10-22 RX ORDER — MIDODRINE HYDROCHLORIDE 5 MG/1
5 TABLET ORAL
Status: DISCONTINUED | OUTPATIENT
Start: 2022-10-22 | End: 2022-10-23

## 2022-10-22 RX ORDER — INSULIN LISPRO 100 [IU]/ML
1-5 INJECTION, SOLUTION INTRAVENOUS; SUBCUTANEOUS
Status: DISCONTINUED | OUTPATIENT
Start: 2022-10-23 | End: 2022-10-26 | Stop reason: HOSPADM

## 2022-10-22 RX ORDER — INSULIN GLARGINE 100 [IU]/ML
4 INJECTION, SOLUTION SUBCUTANEOUS EVERY 24 HOURS
Status: DISCONTINUED | OUTPATIENT
Start: 2022-10-22 | End: 2022-10-23

## 2022-10-22 RX ORDER — INSULIN ASPART 100 [IU]/ML
2 INJECTION, SOLUTION INTRAVENOUS; SUBCUTANEOUS
Qty: 1.8 ML | Refills: 0 | Status: SHIPPED | OUTPATIENT
Start: 2022-10-22 | End: 2022-10-26

## 2022-10-22 RX ORDER — INSULIN LISPRO 100 [IU]/ML
1-5 INJECTION, SOLUTION INTRAVENOUS; SUBCUTANEOUS
Status: DISCONTINUED | OUTPATIENT
Start: 2022-10-22 | End: 2022-10-26 | Stop reason: HOSPADM

## 2022-10-22 RX ORDER — INSULIN LISPRO 100 [IU]/ML
1-5 INJECTION, SOLUTION INTRAVENOUS; SUBCUTANEOUS
Status: DISCONTINUED | OUTPATIENT
Start: 2022-10-22 | End: 2022-10-22

## 2022-10-22 RX ORDER — INSULIN LISPRO 100 [IU]/ML
2 INJECTION, SOLUTION INTRAVENOUS; SUBCUTANEOUS
Status: DISCONTINUED | OUTPATIENT
Start: 2022-10-22 | End: 2022-10-23

## 2022-10-22 RX ADMIN — RISPERIDONE 0.5 MG: 0.25 TABLET ORAL at 09:03

## 2022-10-22 RX ADMIN — Medication 12.5 MG: at 09:03

## 2022-10-22 RX ADMIN — SERTRALINE 100 MG: 100 TABLET, FILM COATED ORAL at 09:03

## 2022-10-22 RX ADMIN — Medication 12.5 MG: at 17:29

## 2022-10-22 RX ADMIN — RISPERIDONE 0.5 MG: 0.25 TABLET ORAL at 17:29

## 2022-10-22 RX ADMIN — INSULIN LISPRO 3 UNITS: 100 INJECTION, SOLUTION INTRAVENOUS; SUBCUTANEOUS at 12:12

## 2022-10-22 RX ADMIN — INSULIN LISPRO 2 UNITS: 100 INJECTION, SOLUTION INTRAVENOUS; SUBCUTANEOUS at 22:17

## 2022-10-22 RX ADMIN — INSULIN LISPRO 3 UNITS: 100 INJECTION, SOLUTION INTRAVENOUS; SUBCUTANEOUS at 17:33

## 2022-10-22 RX ADMIN — CHLORHEXIDINE GLUCONATE 15 ML: 1.2 SOLUTION ORAL at 21:17

## 2022-10-22 RX ADMIN — INSULIN GLARGINE 4 UNITS: 100 INJECTION, SOLUTION SUBCUTANEOUS at 17:32

## 2022-10-22 RX ADMIN — HYDROCORTISONE 10 MG: 10 TABLET ORAL at 09:04

## 2022-10-22 RX ADMIN — APIXABAN 5 MG: 5 TABLET, FILM COATED ORAL at 17:29

## 2022-10-22 RX ADMIN — HYDROCORTISONE 10 MG: 10 TABLET ORAL at 17:29

## 2022-10-22 RX ADMIN — APIXABAN 5 MG: 5 TABLET, FILM COATED ORAL at 09:03

## 2022-10-22 RX ADMIN — ASPIRIN 81 MG CHEWABLE TABLET 81 MG: 81 TABLET CHEWABLE at 09:03

## 2022-10-22 RX ADMIN — INSULIN LISPRO 2 UNITS: 100 INJECTION, SOLUTION INTRAVENOUS; SUBCUTANEOUS at 17:33

## 2022-10-22 RX ADMIN — INSULIN LISPRO 2 UNITS: 100 INJECTION, SOLUTION INTRAVENOUS; SUBCUTANEOUS at 12:29

## 2022-10-22 RX ADMIN — ATORVASTATIN CALCIUM 10 MG: 10 TABLET, FILM COATED ORAL at 17:29

## 2022-10-22 RX ADMIN — CHLORHEXIDINE GLUCONATE 15 ML: 1.2 SOLUTION ORAL at 09:03

## 2022-10-22 RX ADMIN — INSULIN LISPRO 2 UNITS: 100 INJECTION, SOLUTION INTRAVENOUS; SUBCUTANEOUS at 09:05

## 2022-10-22 RX ADMIN — FLUDROCORTISONE ACETATE 0.2 MG: 0.1 TABLET ORAL at 09:04

## 2022-10-22 NOTE — ASSESSMENT & PLAN NOTE
· Continue Florinef/Cortef regimen   · Also chronically on midodrine  Dose was reduced from 10 mg t i d  To 5 and parameters were entered    · Endocrinology following

## 2022-10-22 NOTE — PROGRESS NOTES
Progress Note - Ned Scott 74 y o  male MRN: [id number]    Unit/Bed#: [facility] 629-01 Encounter: [id number]      CC: diabetes f/u    Subjective: This is a 74y o -year-old male with Type 1 diabetes on longterm insulin (Nph and regular insulin), adrenal insufficiency on hydrocortisone admitted with hypoglycemia  Currently patient feels ok and reports good appetite  Objective:     Vitals: Blood pressure 145/80, pulse 66, temperature 97 9 °F (36 6 °C), resp  rate 16, height 5' 4" (1 626 m), weight 91 kg (200 lb 9 6 oz), SpO2 95 %  ,Body mass index is 34 43 kg/m²  Intake/Output Summary (Last 24 hours) at 10/22/2022 1224  Last data filed at 10/22/2022 1201  Gross per 24 hour   Intake --   Output 3425 ml   Net -3425 ml       Physical Exam:  General Appearance: awake, appears stated age and cooperative  Head: Normocephalic, without obvious abnormality, atraumatic  Extremities: moves all extremities  Skin: Skin color and temperature normal    Pulm: no labored breathing      POC Glucose (mg/dl)   Date Value   10/22/2022 340 (H)   10/22/2022 299 (H)   10/21/2022 327 (H)   10/21/2022 237 (H)   10/21/2022 208 (H)   10/21/2022 186 (H)   10/21/2022 224 (H)   10/20/2022 231 (H)   10/20/2022 158 (H)   10/20/2022 136     Assessment: This is a 74y o -year-old male with Type 1 diabetes on longterm insulin (Nph and regular insulin), adrenal insufficiency on hydrocortisone admitted with hypoglycemia        Plan:  # type 1 diabetes on long-term insulin  # hypoglycemia     A1c 7 5 (07/2022)  Home regimen insulin NPH 18 units with breakfast, 2 units at bedtime, regular insulin 10 units with breakfast and dinner      Recommendations:   Pt had episode of hyperglycemia through the day and reports good appetite  Recommend to increase lantus dose to 4 units daily at bedtime  Recommend to start lispro 2 units tid with meals, and recommend to increase correctional scale to algorthim 2     Continue with accu checks     # adrenal insufficiency  History of secondary adrenal insufficiency on hydrocortisone 10 mg b i d (q9AM and q6PM) And fludrocortisone 0 2 mg daily  Continue on medications at home dose  Portions of the record may have been created with voice recognition software

## 2022-10-22 NOTE — ASSESSMENT & PLAN NOTE
· Blood pressure acceptable  Continue Lopressor which he is on for history of PAF  · Continue midodrine, dose reduced and parameters ordered

## 2022-10-22 NOTE — ASSESSMENT & PLAN NOTE
Lab Results   Component Value Date    HGBA1C 7 5 (H) 07/22/2022     · Home insulin regimen Home regimen insulin NPH 18 units with breakfast, 2 units at bedtime, regular insulin 10 units with breakfast and dinner    · Plan to changed to basal/bolus regimen on discharge as above  · Plan as above

## 2022-10-22 NOTE — PLAN OF CARE
Problem: PHYSICAL THERAPY ADULT  Goal: Performs mobility at highest level of function for planned discharge setting  See evaluation for individualized goals  Description: Treatment/Interventions: Spoke to nursing, Gait training, Bed mobility, Patient/family training, Therapeutic exercise, LE strengthening/ROM, Functional transfer training  Equipment Recommended: Frances Davis       See flowsheet documentation for full assessment, interventions and recommendations  Note: Prognosis: Good  Problem List: Decreased strength, Decreased endurance, Impaired balance, Decreased mobility, Decreased coordination, Decreased safety awareness  Assessment: Pt is 74 y o  male seen for PT evaluation s/p admit to [facility] on 10/19/2022 w/ Hypoglycemia  PT consulted to assess pt's functional mobility and d/c needs  Order placed for PT eval and tx, w/ up w/ A order  Comorbidities affecting pt's physical performance at time of assessment include:  has a past medical history of Diabetes mellitus ([facility] [address] )  PTA, pt was ambulates community distances and elevations and lives in multi-level home  Personal factors affecting pt at time of IE include: ambulating w/ assistive device, limited home support, unable to perform physical activity, limited insight into impairments, inability to perform IADLs and inability to perform ADLs  Please find objective findings from PT assessment regarding body systems outlined above with impairments and limitations including impaired balance, decreased endurance, gait deviations, pain, decreased activity tolerance, decreased functional mobility tolerance, decreased safety awareness and SOB upon exertion  Pt required increased A to complete bed mobility with decreased strength and fatigue  Ambulated with slow forward flexed gait although overall steady  Noted decreased activity tolerance for upright mobility  The following objective measures performed on IE also reveal limitations: AM-PAC 6-Clicks: 15/68  The patient's AM-PAC Basic Mobility Inpatient Short Form Raw Score is 17  A Raw score of greater than 16 suggests the patient may benefit from discharge to home  Please also refer to the recommendation of the Physical Therapist for safe discharge planning  Pt's clinical presentation is currently unstable/unpredictable seen in pt's presentation of ongoing medical workup  Pt to benefit from continued PT tx to address deficits as defined above and maximize level of functional independent mobility and consistency  From PT/mobility standpoint, recommendation at time of d/c would be post acute rehabilitation services pending progress in order to facilitate return to PLOF  Barriers to Discharge: Inaccessible home environment, Decreased caregiver support     PT Discharge Recommendation: Home with home health rehabilitation    See flowsheet documentation for full assessment

## 2022-10-22 NOTE — PHYSICAL THERAPY NOTE
Physical Therapy Evaluation     Patient's Name: Yadira Simmons    Admitting Diagnosis  Hypoglycemia [E16 2]    Problem List  Patient Active Problem List   Diagnosis    Recurrent hypoglycemia    Insulin dependent type 1 diabetes mellitus     Hypokalemia    Adrenal insufficiency     Essential hypertension    Paroxysmal atrial fibrillation     History of stroke    Anemia of chronic disease    Hyperlipidemia    Psychiatric disorder       Past Medical History  Past Medical History:   Diagnosis Date    Diabetes mellitus ([facility] [address] )        Past Surgical History  History reviewed  No pertinent surgical history  10/22/22 0834   PT Last Visit   PT Visit Date 10/22/22   Note Type   Note type Evaluation   Pain Assessment   Pain Assessment Tool 0-10   Pain Score No Pain   Hospital Pain Intervention(s) Repositioned; Ambulation/increased activity   Restrictions/Precautions   Weight Bearing Precautions Per Order No   Home Living   Type of [address] [facility] Two level   Bathroom Shower/Tub Walk-in shower   Bathroom Toilet Standard   Bathroom Equipment Shower chair   [address] [facility]   Prior Function   Level of [place] Independent with functional mobility   Lives With Significant other   Receives Help From Family   Vocational Retired   Comments Pt reports being I with ADLs  Per chart pt's s/o has dementia, pt does not have to physcially A  Per pt his daughter checks in daily   General   Family/Caregiver Present No   Cognition   Orientation Level Oriented X4   RLE Assessment   RLE Assessment WFL   LLE Assessment   LLE Assessment WFL   Bed Mobility   Supine to Sit 3  Moderate assistance   Additional items Assist x 1   Sit to Supine Unable to assess   Additional Comments Pt left resting in chair, call bell in reach, chair alarm active   Transfers   Sit to Stand 4  Minimal assistance   Additional items Assist x 1; Increased time required   Stand to Sit 4  Minimal assistance   Additional items Assist x 1; Increased time required   Ambulation/Elevation   Gait pattern Excessively slow; Short stride; Shuffling   Gait Assistance 4  Minimal assist   Additional items Assist x 1   Assistive Device Rolling walker   Distance 60   Balance   Static Sitting Fair +   Static Standing Fair   Ambulatory Poor +   Endurance Deficit   Endurance Deficit Yes   Endurance Deficit Description limited by SOB and RESENDIZ compared to baseline mobility   Activity Tolerance   Activity Tolerance Patient tolerated treatment well;Treatment limited secondary to medical complications (Comment)   Medical Staff Made Aware OT and MD for D/C planning   Nurse Made Aware yes, nsg gave clearance to work with pt   Assessment   Prognosis Good   Problem List Decreased strength;Decreased endurance; Impaired balance;Decreased mobility; Decreased coordination;Decreased safety awareness   Assessment Pt is 74 y o  male seen for PT evaluation s/p admit to [facility] on 10/19/2022 w/ Hypoglycemia  PT consulted to assess pt's functional mobility and d/c needs  Order placed for PT eval and tx, w/ up w/ A order  Comorbidities affecting pt's physical performance at time of assessment include:  has a past medical history of Diabetes mellitus ([facility] [address] )  PTA, pt was ambulates community distances and elevations and lives in multi-level home  Personal factors affecting pt at time of IE include: ambulating w/ assistive device, limited home support, unable to perform physical activity, limited insight into impairments, inability to perform IADLs and inability to perform ADLs  Please find objective findings from PT assessment regarding body systems outlined above with impairments and limitations including impaired balance, decreased endurance, gait deviations, pain, decreased activity tolerance, decreased functional mobility tolerance, decreased safety awareness and SOB upon exertion   Pt required increased A to complete bed mobility with decreased strength and fatigue  Ambulated with slow forward flexed gait although overall steady  Noted decreased activity tolerance for upright mobility  The following objective measures performed on IE also reveal limitations: AM-PAC 6-Clicks: 74/67  The patient's AM-[facility] Basic Mobility Inpatient Short Form Raw Score is 17  A Raw score of greater than 16 suggests the patient may benefit from discharge to home  Please also refer to the recommendation of the Physical Therapist for safe discharge planning  Pt's clinical presentation is currently unstable/unpredictable seen in pt's presentation of ongoing medical workup  Pt to benefit from continued PT tx to address deficits as defined above and maximize level of functional independent mobility and consistency  From PT/mobility standpoint, recommendation at time of d/c would be post acute rehabilitation services pending progress in order to facilitate return to PLOF  Barriers to Discharge Inaccessible home environment;Decreased caregiver support   Goals   Patient Goals To get better   STG Expiration Date 11/03/22   Short Term Goal #1 1  Complete bed mobility and transfers I to decrease need for caregiver in home  2  Ambulate 300' I to complete household and community mobility without A  3  Improve dynamic balance to good to decrease need for UE support during ambulation  4  Be educated & demonstate 12 steps to be able to enter home without A  Plan   Treatment/Interventions Spoke to nursing;Gait training;Bed mobility; Patient/family training; Therapeutic exercise;LE strengthening/ROM; Functional transfer training   PT Frequency 3-5x/wk   Recommendation   PT Discharge Recommendation Home with home health rehabilitation   Maynor garland   Change/add to [place]?  No   AM-PAC Basic Mobility Inpatient   Turning in Bed Without Bedrails 2   Lying on Back to Sitting on Edge of Flat Bed 3   Moving Bed to Chair 3   Standing Up From Chair 3 Walk in Room 3   Climb 3-5 Stairs 3   Basic Mobility Inpatient Raw Score 17   Basic Mobility Standardized Score 39 67   Highest Level Of Mobility   -HLM Goal 5: Stand one or more mins   -HLM Achieved 7: Walk 25 feet or more         Imani Fish

## 2022-10-22 NOTE — ASSESSMENT & PLAN NOTE
Type 1 diabetic presented with encephalopathy in setting of hypoglycemia, required glucose from EMS  Was a rapid response 10/19 and required intubation secondary to symptomatic hypoglycemia/altered mental status  Blood sugar was 30, i-STAT less than 20  Subsequently extubated and transferred out of ICU  · Endocrinology following, insulin per endocrinology  Additional adjustments to be made today  · Plan to d/c on Lantus ($96 06/month)/Novolog pens, script sent to [facility] however pharmacy will need to be updated regarding final dose recommendations from endocrinology  Generic Novolog was indicated on the script as this is a bit cheaper at $63 08/month     · Has Dexcom outpatient  · Diabetic diet  · Monitor Accu-Cheks closely  · PT/OT cleared for home

## 2022-10-22 NOTE — PLAN OF CARE
Problem: Potential for Falls  Goal: Patient will remain free of falls  Description: INTERVENTIONS:  - Educate patient/family on patient safety including physical limitations  - Instruct patient to call for assistance with activity   - Consult OT/PT to assist with strengthening/mobility   - Keep Call bell within reach  - Keep bed low and locked with side rails adjusted as appropriate  - Keep care items and personal belongings within reach  - Initiate and maintain comfort rounds  - Make Fall Risk Sign visible to staff  - Offer Toileting   - Apply yellow socks and bracelet for high fall risk patients  - Consider moving patient to room near nurses station  Outcome: Progressing     Problem: PAIN - ADULT  Goal: Verbalizes/displays adequate comfort level or baseline comfort level  Description: Interventions:  - Encourage patient to monitor pain and request assistance  - Assess pain using appropriate pain scale  - Administer analgesics based on type and severity of pain and evaluate response  - Implement non-pharmacological measures as appropriate and evaluate response  - Consider cultural and social influences on pain and pain management  - Notify physician/advanced practitioner if interventions unsuccessful or patient reports new pain  Outcome: Progressing     Problem: INFECTION - ADULT  Goal: Absence or prevention of progression during hospitalization  Description: INTERVENTIONS:  - Assess and monitor for signs and symptoms of infection  - Monitor lab/diagnostic results  - Monitor all insertion sites, i e  indwelling lines, tubes, and drains  - Monitor endotracheal if appropriate and nasal secretions for changes in amount and color  - [place] appropriate cooling/warming therapies per order  - Administer medications as ordered  - Instruct and encourage patient and family to use good hand hygiene technique  - Identify and instruct in appropriate isolation precautions for identified infection/condition  Outcome: Progressing  Goal: Absence of fever/infection during neutropenic period  Description: INTERVENTIONS:  - Monitor WBC    Outcome: Progressing     Problem: SAFETY ADULT  Goal: Patient will remain free of falls  Description: INTERVENTIONS:  - Educate patient/family on patient safety including physical limitations  - Instruct patient to call for assistance with activity   - Consult OT/PT to assist with strengthening/mobility   - Keep Call bell within reach  - Keep bed low and locked with side rails adjusted as appropriate  - Keep care items and personal belongings within reach  - Initiate and maintain comfort rounds  - Make Fall Risk Sign visible to staff  - Offer Toileting   - Apply yellow socks and bracelet for high fall risk patients  - Consider moving patient to room near nurses station  Outcome: Progressing  Goal: Maintain or return to baseline ADL function  Description: INTERVENTIONS:  -  Assess patient's ability to carry out ADLs; assess patient's baseline for ADL function and identify physical deficits which impact ability to perform ADLs (bathing, care of mouth/teeth, toileting, grooming, dressing, etc )  - Assess/evaluate cause of self-care deficits   - Assess range of motion  - Assess patient's mobility; develop plan if impaired  - Assess patient's need for assistive devices and provide as appropriate  - Encourage maximum independence but intervene and supervise when necessary  - Involve family in performance of ADLs  - Assess for home care needs following discharge   - Consider OT consult to assist with ADL evaluation and planning for discharge  - Provide patient education as appropriate  Outcome: Progressing  Goal: Maintains/Returns to pre admission functional level  Description: INTERVENTIONS:  - Perform BMAT or MOVE assessment daily    - Set and communicate daily mobility goal to care team and patient/family/caregiver     - Collaborate with rehabilitation services on mobility goals if consulted  - Out of bed for toileting  - Record patient progress and toleration of activity level   Outcome: Progressing     Problem: DISCHARGE PLANNING  Goal: Discharge to home or other facility with appropriate resources  Description: INTERVENTIONS:  - Identify barriers to discharge w/patient and caregiver  - Arrange for needed discharge resources and transportation as appropriate  - Identify discharge learning needs (meds, wound care, etc )  - Arrange for interpretive services to assist at discharge as needed  - Refer to Case Management Department for coordinating discharge planning if the patient needs post-hospital services based on physician/advanced practitioner order or complex needs related to functional status, cognitive ability, or social support system  Outcome: Progressing     Problem: Knowledge Deficit  Goal: Patient/family/caregiver demonstrates understanding of disease process, treatment plan, medications, and discharge instructions  Description: Complete learning assessment and assess knowledge base  Interventions:  - Provide teaching at level of understanding  - Provide teaching via preferred learning methods  Outcome: Progressing     Problem: Nutrition/Hydration-ADULT  Goal: Nutrient/Hydration intake appropriate for improving, restoring or maintaining nutritional needs  Description: Monitor and assess patient's nutrition/hydration status for malnutrition  Collaborate with interdisciplinary team and initiate plan and interventions as ordered  Monitor patient's weight and dietary intake as ordered or per policy  Utilize nutrition screening tool and intervene as necessary  Determine patient's food preferences and provide high-protein, high-caloric foods as appropriate       INTERVENTIONS:  - Monitor oral intake, urinary output, labs, and treatment plans  - Assess nutrition and hydration status and recommend course of action  - Evaluate amount of meals eaten  - Assist patient with eating if necessary   - Allow adequate time for meals  - Recommend/ encourage appropriate diets, oral nutritional supplements, and vitamin/mineral supplements  - Order, calculate, and assess calorie counts as needed  - Recommend, monitor, and adjust tube feedings and TPN/PPN based on assessed needs  - Assess need for intravenous fluids  - Provide specific nutrition/hydration education as appropriate  - Include patient/family/caregiver in decisions related to nutrition  Outcome: Progressing     Problem: MOBILITY - ADULT  Goal: Maintain or return to baseline ADL function  Description: INTERVENTIONS:  -  Assess patient's ability to carry out ADLs; assess patient's baseline for ADL function and identify physical deficits which impact ability to perform ADLs (bathing, care of mouth/teeth, toileting, grooming, dressing, etc )  - Assess/evaluate cause of self-care deficits   - Assess range of motion  - Assess patient's mobility; develop plan if impaired  - Assess patient's need for assistive devices and provide as appropriate  - Encourage maximum independence but intervene and supervise when necessary  - Involve family in performance of ADLs  - Assess for home care needs following discharge   - Consider OT consult to assist with ADL evaluation and planning for discharge  - Provide patient education as appropriate  Outcome: Progressing  Goal: Maintains/Returns to pre admission functional level  Description: INTERVENTIONS:  - Perform BMAT or MOVE assessment daily    - Set and communicate daily mobility goal to care team and patient/family/caregiver     - Collaborate with rehabilitation services on mobility goals if consulted  - Out of bed for toileting  - Record patient progress and toleration of activity level   Outcome: Progressing     Problem: Prexisting or High Potential for Compromised Skin Integrity  Goal: Skin integrity is maintained or improved  Description: INTERVENTIONS:  - Identify patients at risk for skin breakdown  - Assess and monitor skin integrity  - Assess and monitor nutrition and hydration status  - Monitor labs   - Assess for incontinence   - Turn and reposition patient  - Assist with mobility/ambulation  - Relieve pressure over bony prominences  - Avoid friction and shearing  - Provide appropriate hygiene as needed including keeping skin clean and dry  - Evaluate need for skin moisturizer/barrier cream  - Collaborate with interdisciplinary team   - Patient/family teaching  - Consider wound care consult   Outcome: Progressing

## 2022-10-22 NOTE — PLAN OF CARE
Problem: OCCUPATIONAL THERAPY ADULT  Goal: Performs self-care activities at highest level of function for planned discharge setting  See evaluation for individualized goals  Description: Treatment Interventions: ADL retraining, Functional transfer training, Cognitive reorientation, Endurance training, Energy conservation, Activityengagement, Compensatory technique education          See flowsheet documentation for full assessment, interventions and recommendations  10/22/2022 1405 by Rishi De La Garza  Note: Limitation: Decreased cognition, Decreased self-care trans, Decreased high-level ADLs, Decreased Safe judgement during ADL, Decreased ADL status, Decreased endurance  Prognosis: Good  Assessment: Pt is a 74 y o  male being seen for an initial Occupational Therapy Evaluation following admission to [facility] after being found unresponsive due to hypoglycemia  Pts primary dx includes recurrent hypoglycemia  Pt was recently discharged from [facility] for hypoglycemic episodes on 10/13 and 10/18  Pt has a past medical history of Diabetes mellitus ([facility] [address] ), hypokalemia, adrenal insufficiency, HTN, paroxysmal Afib, history of stroke, anemia of chronic disease, hyperlipidemia, and psychiatric disorder  Pt is from [facility] with [facility] where he lives with his wife who has dementia  Pt reports he does not assist spouse with ADLs  At baseline pt reports being independent in ADLs/IADLs and driving  Pt requires overall Mod Ax1 for bed mobility, Min Ax1 for transfers and functional mobility with RW  Pt also requires overall supervision for UB bathing/dressing and Min A for LB bathing/dressing and toileting  Pt presents with the following limitations fall risk, decreased cognition, decreased activity tolerance/endurance, impaired insight into deficits, decreased safety awareness, and increased time to complete ADLs at this time  Pt was educated on energy conservation techniques for carryover once discharged home  Pt was left in bedside chair with all needs within reach  Pt reported no further concerns or questions at this time  The patient's raw score on the AM-PAC Daily Activity inpatient short form is 21, standardized score is 44 27, greater than 39 4  Patients at this level are likely to benefit from discharge to home  Please refer to the recommendation of the Occupational Therapist for safe discharge planning  From an OT perspective pt can return home with increased family support once medically cleared  OT will continue to follow and treat patient while in the hospital to achieve goals  OT Discharge Recommendation: No rehabilitation needs       10/22/2022 1404 by Mary Cook  Note: Limitation: Decreased cognition, Decreased self-care trans, Decreased high-level ADLs, Decreased Safe judgement during ADL, Decreased ADL status, Decreased endurance  Prognosis: Good  Assessment: Pt is a 74 y o  male being seen for an initial Occupational Therapy Evaluation following admission to [facility] after being found unresponsive due to hypoglycemia  Pts primary dx includes recurrent hypoglycemia  Pt was recently discharged from [facility] for hypoglycemic episodes on 10/13 and 10/18  Pt has a past medical history of Diabetes mellitus ([facility] [address] ), hypokalemia, adrenal insufficiency, HTN, paroxysmal Afib, history of stroke, anemia of chronic disease, hyperlipidemia, and psychiatric disorder  Pt is from [facility] [address] with [facility] where he lives with his wife who has dementia  Pt reports he does not assist spouse with ADLs  At baseline pt reports being independent in ADLs/IADLs and driving  Pt requires overall Mod Ax1 for bed mobility, Min Ax1 for transfers and functional mobility with RW  Pt also requires overall supervision for UB bathing/dressing and Min A for LB bathing/dressing and toileting   Pt presents with the following limitations fall risk, decreased cognition, decreased activity tolerance/endurance, impaired insight into deficits, decreased safety awareness, and increased time to complete ADLs at this time  Pt was educated on energy conservation techniques for carryover once discharged home  Pt was left in bedside chair with all needs within reach  Pt reported no further concerns or questions at this time  The patient's raw score on the AM-PAC Daily Activity inpatient short form is 21, standardized score is 44 27, greater than 39 4  Patients at this level are likely to benefit from discharge to home  Please refer to the recommendation of the Occupational Therapist for safe discharge planning  From an OT perspective pt can return home with increased family support once medically cleared  OT will continue to follow and treat patient while in the hospital to achieve goals  OT Discharge Recommendation: No rehabilitation needs       10/22/2022 1402 by Alexander Roach  Note: Limitation: Decreased cognition, Decreased self-care trans, Decreased high-level ADLs, Decreased Safe judgement during ADL, Decreased ADL status, Decreased endurance  Prognosis: Good  Assessment: Pt is a 74 y o  male being seen for an initial Occupational Therapy Evaluation following admission to [facility] after being found unresponsive due to hypoglycemia  Pts primary dx includes recurrent hypoglycemia  Pt was recently discharged from [facility] for hypoglycemic episodes on 10/13 and 10/18  Pt has a past medical history of Diabetes mellitus ([address] ), hypokalemia, adrenal insufficiency, HTN, paroxysmal Afib, history of stroke, anemia of chronic disease, hyperlipidemia, and psychiatric disorder  Pt is from a [facility] [address] with 5[place] where he lives with his wife who has dementia  Pt reports he does not assist spouse with ADLs  At baseline pt reports being independent in ADLs/IADLs and driving  Pt requires overall Mod Ax1 for bed mobility, Min Ax1 for transfers and functional mobility with RW   Pt also requires overall supervision for UB bathing/dressing and Min A for LB bathing/dressing and toileting  Pt presents with the following limitations fall risk, decreased cognition, decreased activity tolerance/endurance, impaired insight into deficits, decreased safety awareness, and increased time to complete ADLs at this time  Pt was educated on energy conservation techniques for carryover once discharged home  Pt was left in bedside chair with all needs within reach  Pt reported no further concerns or questions at this time  The patient's raw score on the AM-PAC Daily Activity inpatient short form is 21, standardized score is 44 27, greater than 39 4  Patients at this level are likely to benefit from discharge to home  Please refer to the recommendation of the Occupational Therapist for safe discharge planning  From an OT perspective pt can return home with increased family support once medically cleared  OT will continue to follow and treat patient while in the hospital to achieve goals       OT Discharge Recommendation: No rehabilitation needs

## 2022-10-22 NOTE — PROGRESS NOTES
Reny Stevens 1481 1955, 74 y o  male MRN: [id number]  Unit/Bed#: [facility] 629-01 Encounter: [id number]  Primary Care Provider: Williams Osorio MD   Date and time admitted to hospital: 10/19/2022  6:33 AM    * Recurrent hypoglycemia  Assessment & Plan  Type 1 diabetic presented with encephalopathy in setting of hypoglycemia, required glucose from EMS  Was a rapid response 10/19 and required intubation secondary to symptomatic hypoglycemia/altered mental status  Blood sugar was 30, i-STAT less than 20  Subsequently extubated and transferred out of ICU  · Endocrinology following, insulin per endocrinology  Additional adjustments to be made today  · Plan to d/c on Lantus ($96 06/month)/Novolog pens, script sent to [facility] however pharmacy will need to be updated regarding final dose recommendations from endocrinology  Generic Novolog was indicated on the script as this is a bit cheaper at $63 08/month  · Has Dexcom outpatient  · Diabetic diet  · Monitor Accu-Cheks closely  · PT/OT cleared for home    Insulin dependent type 1 diabetes mellitus   Assessment & Plan  Lab Results   Component Value Date    HGBA1C 7 5 (H) 07/22/2022     · Home insulin regimen Home regimen insulin NPH 18 units with breakfast, 2 units at bedtime, regular insulin 10 units with breakfast and dinner  · Plan to changed to basal/bolus regimen on discharge as above  · Plan as above    Hypokalemia  Assessment & Plan  · Monitor/replete serum potassium and magnesium deficiencies as present    Adrenal insufficiency   Assessment & Plan  · Continue Florinef/Cortef regimen   · Also chronically on midodrine  Dose was reduced from 10 mg t i d  To 5 and parameters were entered  · Endocrinology following    Essential hypertension  Assessment & Plan  · Blood pressure acceptable  Continue Lopressor which he is on for history of PAF  · Continue midodrine, dose reduced and parameters ordered  Paroxysmal atrial fibrillation   Assessment & Plan  · Continue Lopressor and Eliquis for anticoagulation  History of stroke  Assessment & Plan  · Continue ASA/Eliquis/statin    Anemia of chronic disease  Assessment & Plan  · Chronic and stable  Hyperlipidemia  Assessment & Plan  · Continue statin    Psychiatric disorder  Assessment & Plan  · Continue Risperdal/Zoloft home regimen      VTE Pharmacologic Prophylaxis: VTE Score: 2 Moderate Risk (Score 3-4) - Pharmacological DVT Prophylaxis Ordered: apixaban (Eliquis)  Patient Centered Rounds: I performed bedside rounds with nursing staff today  Discussions with Specialists or Other Care Team Provider: nursing, endocrinology, [facility] regarding insulin     Education and Discussions with Family / Patient: Updated  (daughter) via phone  Sonia    Time Spent for Care: 30 minutes  More than 50% of total time spent on counseling and coordination of care as described above  Current Length of Stay: 3 day(s)  Current Patient Status: Inpatient     Certification Statement: The patient will continue to require additional inpatient hospital stay due to Insulin to be adjusted today, monitoring blood glucose accordingly     Discharge Plan: Anticipate discharge tomorrow to home  Code Status: Level 1 - Full Code    Subjective:   Patient offers no acute complaints  We discussed insulin recommendations, he is in agreement to cost     Objective:     Vitals:   Temp (24hrs), Av 7 °F (36 5 °C), Min:97 5 °F (36 4 °C), Max:97 9 °F (36 6 °C)    Temp:  [97 5 °F (36 4 °C)-97 9 °F (36 6 °C)] 97 9 °F (36 6 °C)  HR:  [67-80] 80  Resp:  [15-18] 16  BP: (147-168)/(67-88) 163/85  SpO2:  [93 %-98 %] 95 %  Body mass index is 34 43 kg/m²  Input and Output Summary (last 24 hours):      Intake/Output Summary (Last 24 hours) at 10/22/2022 1119  Last data filed at 10/22/2022 0409  Gross per 24 hour   Intake --   Output 2600 ml   Net -2600 ml Physical Exam:   Physical Exam  Vitals and nursing note reviewed  Constitutional:       Appearance: He is obese  Cardiovascular:      Rate and Rhythm: Normal rate  Pulmonary:      Breath sounds: Normal breath sounds  Abdominal:      Tenderness: There is no abdominal tenderness  Musculoskeletal:         General: No swelling  Skin:     General: Skin is warm  Neurological:      Mental Status: He is alert and oriented to person, place, and time  Mental status is at baseline  Psychiatric:         Mood and Affect: Affect is flat  Additional Data:     Labs:  Results from last 7 days   Lab Units 10/21/22  0517   WBC Thousand/uL 8 11   HEMOGLOBIN g/dL 8 5*   HEMATOCRIT % 26 6*   PLATELETS Thousands/uL 173   NEUTROS PCT % 61   LYMPHS PCT % 22   MONOS PCT % 14*   EOS PCT % 3     Results from last 7 days   Lab Units 10/21/22  0517 10/20/22  0237 10/20/22  0024   SODIUM mmol/L 140   < > 141   POTASSIUM mmol/L 3 8   < > 3 7   CHLORIDE mmol/L 108   < > 111*   CO2 mmol/L 26   < > 29   BUN mg/dL 10   < > 9   CREATININE mg/dL 1 00   < > 1 10   ANION GAP mmol/L 6   < > 1*   CALCIUM mg/dL 8 4   < > 8 1*   ALBUMIN g/dL  --   --  2 7*   TOTAL BILIRUBIN mg/dL  --   --  0 45   ALK PHOS U/L  --   --  64   ALT U/L  --   --  15   AST U/L  --   --  7   GLUCOSE RANDOM mg/dL 200*   < > 27*    < > = values in this interval not displayed           Results from last 7 days   Lab Units 10/22/22  0747 10/21/22  2144 10/21/22  1730 10/21/22  1054 10/21/22  0749 10/21/22  0206 10/20/22  2056 10/20/22  1723 10/20/22  1501 10/20/22  1203 10/20/22  1046 10/20/22  0742   POC GLUCOSE mg/dl 299* 327* 237* 208* 186* 224* 231* 158* 136 124 155* 113         Results from last 7 days   Lab Units 10/19/22  2208   LACTIC ACID mmol/L 1 3       Lines/Drains:  Invasive Devices  Report    Peripheral Intravenous Line  Duration           Peripheral IV 10/19/22 Left Antecubital 3 days    Peripheral IV 10/20/22 Distal;Left;Upper;Ventral (anterior) Arm 2 days    Peripheral IV 10/20/22 Right;Upper;Ventral (anterior) Arm 2 days          Drain  Duration           External Urinary Catheter Medium 2 days                      Imaging: No pertinent imaging reviewed  Recent Cultures (last 7 days):         Last 24 Hours Medication List:   Current Facility-Administered Medications   Medication Dose Route Frequency Provider Last Rate   • acetaminophen  650 mg Oral Q6H PRN Hetul Red, DO     • apixaban  5 mg Oral BID Hetul Red, DO     • aspirin  81 mg Oral Daily Hetul Red, DO     • atorvastatin  10 mg Oral Daily With [employer], DO     • chlorhexidine  15 mL Mouth/Throat Q12H [facility] Hetul Red, DO     • fludrocortisone  0 2 mg Oral Daily Hetul Red, DO     • hydrocortisone  10 mg Oral BID Hetul Red, DO     • insulin glargine  2 Units Subcutaneous Q24H Hetul Red, DO     • insulin lispro  1-5 Units Subcutaneous TID AC Hetul Red, DO     • metoprolol tartrate  12 5 mg Oral BID Hetul Red, DO     • midodrine  5 mg Oral TID after meals NARA Wins[place]     • risperiDONE  0 5 mg Oral BID Hetul Red, DO     • sertraline  100 mg Oral Daily Hetul Red, DO          Today, Patient Was Seen By: Rezno Calle    **Please Note: This note may have been constructed using a voice recognition system  **

## 2022-10-22 NOTE — OCCUPATIONAL THERAPY NOTE
Occupational Therapy Evaluation     Patient Name: Jami Rivera  [id number] Date: 10/22/2022  Problem List  Principal Problem:    Recurrent hypoglycemia  Active Problems:    Insulin dependent type 1 diabetes mellitus     Hypokalemia    Adrenal insufficiency     Essential hypertension    Paroxysmal atrial fibrillation     History of stroke    Anemia of chronic disease    Hyperlipidemia    Psychiatric disorder    Past Medical History  Past Medical History:   Diagnosis Date   • Diabetes mellitus ([address] )      Past Surgical History  History reviewed  No pertinent surgical history  10/22/22 0835   OT Last Visit   OT Visit Date 10/22/22   Note Type   Note type Evaluation   Pain Assessment   Pain Assessment Tool 0-10   Pain Score No Pain   Hospital Pain Intervention(s) Repositioned; Ambulation/increased activity   Restrictions/Precautions   Weight Bearing Precautions Per Order No   Home Living   Type of [address] [facility] Two level  (5STE)   Bathroom Shower/Tub Walk-in shower   Bathroom Toilet Standard   Bathroom Equipment Shower chair   [address] Walker  (uses walker occassionally)   Prior Function   Level of [place] Independent with ADLs; Independent with functional mobility; Independent with IADLS   Lives With Spouse   Receives Help From Family  (daughter checks in on pt 3 times a day per pts chart)   IADLs Independent with driving; Independent with meal prep; Independent with medication management   Vocational Retired   Comments prior to admission pt was indepedent in ADLs/IADLs and driving   Lifestyle   Autonomy pt reports being independent with ADLs/IADLs and driving   Reciprocal Relationships pt lives with wife who has dementia however pt reports he does not assist wife at home   Service to Others retired; heavy equipment   [address] enjoys playing with his dog   ADL   Eating Assistance 7  Independent   Grooming Assistance 7  [address] Assistance 5  Supervision/Setup   LB Bathing Assistance 4  Minimal Assistance   [address] 5  Supervision/Setup   LB Dressing Assistance 4  Minimal Assistance   Toileting Assistance  4  Minimal Assistance   Functional Assistance 4  Minimal Assistance   Bed Mobility   Supine to Sit 3  Moderate assistance   Additional items Assist x 1   Additional Comments pt was left in bedside chair with all needs within reach   Transfers   Sit to Stand 4  Minimal assistance   Additional items Assist x 1; Increased time required   Stand to Sit 4  Minimal assistance   Additional items Assist x 1; Increased time required   Functional Mobility   Functional Mobility 4  Minimal assistance   Additional items Rolling walker   Balance   Static Sitting Fair +   Static Standing Fair   Ambulatory Poor +   Activity Tolerance   Activity Tolerance Patient tolerated treatment well   Medical Staff Made Aware PT co treatment due to pts medical complexity and overall limited activity tolerance   Nurse Made Aware per nursing pt was appropriate to be seen   RUE Assessment   RUE Assessment WFL   LUE Assessment   LUE Assessment WFL   Psychosocial   Psychosocial (WDL) WDL   Cognition   Overall Cognitive Status WFL   Arousal/Participation Alert; Responsive; Cooperative   Attention Within functional limits   Orientation Level Oriented X4   Memory Decreased recall of precautions   Following Commands Follows one step commands without difficulty   Comments pt was overall cooperative and pleasant; limited insight into deficits   Assessment   Limitation Decreased cognition;Decreased self-care trans;Decreased high-level ADLs; Decreased Safe judgement during ADL;Decreased ADL status; Decreased endurance   Prognosis Good   Assessment Pt is a 74 y o  male being seen for an initial Occupational Therapy Evaluation following admission to [facility] after being found unresponsive due to hypoglycemia  Pts primary dx includes recurrent hypoglycemia   Pt was recently discharged from [facility] for hypoglycemic episodes on 10/13 and 10/18  Pt has a past medical history of Diabetes mellitus ([address] ), hypokalemia, adrenal insufficiency, HTN, paroxysmal Afib, history of stroke, anemia of chronic disease, hyperlipidemia, and psychiatric disorder  Pt is from a [address] with 5STE where he lives with his wife who has dementia  Pt reports he does not assist spouse with ADLs  At baseline pt reports being independent in ADLs/IADLs and driving  Pt requires overall Mod Ax1 for bed mobility, Min Ax1 for transfers and functional mobility with RW  Pt also requires overall supervision for UB bathing/dressing and Min A for LB bathing/dressing and toileting  Pt presents with the following limitations fall risk, decreased cognition, decreased activity tolerance/endurance, impaired insight into deficits, decreased safety awareness, and increased time to complete ADLs at this time  Pt was educated on energy conservation techniques for carryover once discharged home  Pt was left in bedside chair with all needs within reach  Pt reported no further concerns or questions at this time  The patient's raw score on the AM-PAC Daily Activity inpatient short form is 21, standardized score is 44 27, greater than 39 4  Patients at this level are likely to benefit from discharge to home  Please refer to the recommendation of the Occupational Therapist for safe discharge planning  From an OT perspective pt can return home with increased family support once medically cleared  OT will continue to follow and treat patient while in the hospital to achieve goals  Goals   Patient Goals to get better   LTG Time Frame 10-14   Long Term Goal see goals below   Plan   Treatment Interventions ADL retraining;Functional transfer training;Cognitive reorientation; Endurance training;Energy conservation; Activityengagement; Compensatory technique education   Goal Expiration Date 11/05/22   OT Frequency 3-5x/wk Recommendation   OT Discharge Recommendation Post acute rehabilitation services   AM-PAC Daily Activity Inpatient   Lower Body Dressing 3   Bathing 3   Toileting 3   Upper Body Dressing 4   Grooming 4   Eating 4   Daily Activity Raw Score 21   Daily Activity Standardized Score (Calc for Raw Score >=11) 44 27   AM-PAC Applied Cognition Inpatient   Following a Speech/Presentation 4   Understanding Ordinary Conversation 4   Taking Medications 4   Remembering Where Things Are Placed or Put Away 4   Remembering List of 4-5 Errands 3   Taking Care of Complicated Tasks 3   Applied Cognition Raw Score 22   Applied Cognition Standardized Score 47 83     Pt will complete bed mobility with Mod I  Pt will complete functional transfer including toilet transfer with Mod I using DME as needed within 2 weeks  Pt will be Mod I with functional mobility for household distances using DME as needed within 2 weeks  Pt will complete UB and LB bathing with Mod I using compensatory/adaptive strategies within 2 weeks  Pt will complete UB and LB dressing with Mod I using compensatory/adaptive strategies within 2 weeks  Pt will complete an IADL task with Mod I while standing for 10 min to improve activity tolerance using DME as needed within 2 weeks  Pt will complete a formal cognitive assessment to assist with safe discharge planning       Musa Burgos

## 2022-10-23 LAB
GLUCOSE SERPL-MCNC: 211 MG/DL (ref 65–140)
GLUCOSE SERPL-MCNC: 223 MG/DL (ref 65–140)
GLUCOSE SERPL-MCNC: 307 MG/DL (ref 65–140)
GLUCOSE SERPL-MCNC: 322 MG/DL (ref 65–140)
GLUCOSE SERPL-MCNC: 351 MG/DL (ref 65–140)

## 2022-10-23 PROCEDURE — 99233 SBSQ HOSP IP/OBS HIGH 50: CPT | Performed by: PHYSICIAN ASSISTANT

## 2022-10-23 PROCEDURE — 82948 REAGENT STRIP/BLOOD GLUCOSE: CPT

## 2022-10-23 PROCEDURE — 99232 SBSQ HOSP IP/OBS MODERATE 35: CPT | Performed by: INTERNAL MEDICINE

## 2022-10-23 RX ORDER — MIDODRINE HYDROCHLORIDE 5 MG/1
5 TABLET ORAL 3 TIMES DAILY PRN
Status: DISCONTINUED | OUTPATIENT
Start: 2022-10-23 | End: 2022-10-26 | Stop reason: HOSPADM

## 2022-10-23 RX ORDER — INSULIN LISPRO 100 [IU]/ML
3 INJECTION, SOLUTION INTRAVENOUS; SUBCUTANEOUS
Status: DISCONTINUED | OUTPATIENT
Start: 2022-10-23 | End: 2022-10-24

## 2022-10-23 RX ORDER — INSULIN GLARGINE 100 [IU]/ML
5 INJECTION, SOLUTION SUBCUTANEOUS EVERY 24 HOURS
Status: DISCONTINUED | OUTPATIENT
Start: 2022-10-23 | End: 2022-10-24

## 2022-10-23 RX ADMIN — APIXABAN 5 MG: 5 TABLET, FILM COATED ORAL at 17:05

## 2022-10-23 RX ADMIN — INSULIN GLARGINE 5 UNITS: 100 INJECTION, SOLUTION SUBCUTANEOUS at 17:06

## 2022-10-23 RX ADMIN — ATORVASTATIN CALCIUM 10 MG: 10 TABLET, FILM COATED ORAL at 17:06

## 2022-10-23 RX ADMIN — SERTRALINE 100 MG: 100 TABLET, FILM COATED ORAL at 08:32

## 2022-10-23 RX ADMIN — APIXABAN 5 MG: 5 TABLET, FILM COATED ORAL at 08:32

## 2022-10-23 RX ADMIN — HYDROCORTISONE 10 MG: 10 TABLET ORAL at 08:33

## 2022-10-23 RX ADMIN — Medication 12.5 MG: at 17:06

## 2022-10-23 RX ADMIN — HYDROCORTISONE 10 MG: 10 TABLET ORAL at 17:06

## 2022-10-23 RX ADMIN — INSULIN LISPRO 2 UNITS: 100 INJECTION, SOLUTION INTRAVENOUS; SUBCUTANEOUS at 08:33

## 2022-10-23 RX ADMIN — Medication 12.5 MG: at 08:32

## 2022-10-23 RX ADMIN — INSULIN LISPRO 3 UNITS: 100 INJECTION, SOLUTION INTRAVENOUS; SUBCUTANEOUS at 17:06

## 2022-10-23 RX ADMIN — FLUDROCORTISONE ACETATE 0.2 MG: 0.1 TABLET ORAL at 08:33

## 2022-10-23 RX ADMIN — ASPIRIN 81 MG CHEWABLE TABLET 81 MG: 81 TABLET CHEWABLE at 08:32

## 2022-10-23 RX ADMIN — INSULIN LISPRO 3 UNITS: 100 INJECTION, SOLUTION INTRAVENOUS; SUBCUTANEOUS at 17:07

## 2022-10-23 RX ADMIN — CHLORHEXIDINE GLUCONATE 15 ML: 1.2 SOLUTION ORAL at 20:45

## 2022-10-23 RX ADMIN — CHLORHEXIDINE GLUCONATE 15 ML: 1.2 SOLUTION ORAL at 08:32

## 2022-10-23 RX ADMIN — RISPERIDONE 0.5 MG: 0.25 TABLET ORAL at 17:06

## 2022-10-23 RX ADMIN — RISPERIDONE 0.5 MG: 0.25 TABLET ORAL at 08:32

## 2022-10-23 RX ADMIN — INSULIN LISPRO 3 UNITS: 100 INJECTION, SOLUTION INTRAVENOUS; SUBCUTANEOUS at 11:57

## 2022-10-23 RX ADMIN — INSULIN LISPRO 3 UNITS: 100 INJECTION, SOLUTION INTRAVENOUS; SUBCUTANEOUS at 21:13

## 2022-10-23 RX ADMIN — INSULIN LISPRO 2 UNITS: 100 INJECTION, SOLUTION INTRAVENOUS; SUBCUTANEOUS at 11:57

## 2022-10-23 NOTE — PLAN OF CARE
Problem: Potential for Falls  Goal: Patient will remain free of falls  Description: INTERVENTIONS:  - Educate patient/family on patient safety including physical limitations  - Instruct patient to call for assistance with activity   - Consult OT/PT to assist with strengthening/mobility   - Keep Call bell within reach  - Keep bed low and locked with side rails adjusted as appropriate  - Keep care items and personal belongings within reach  - Initiate and maintain comfort rounds  - Make Fall Risk Sign visible to staff  - Offer Toileting every 2 Hours, in advance of need  - Initiate/Maintain bed/chair alarm  - Obtain necessary fall risk management equipment  - Apply yellow socks and bracelet for high fall risk patients  - Consider moving patient to room near nurses station  Outcome: Progressing     Problem: PAIN - ADULT  Goal: Verbalizes/displays adequate comfort level or baseline comfort level  Description: Interventions:  - Encourage patient to monitor pain and request assistance  - Assess pain using appropriate pain scale  - Administer analgesics based on type and severity of pain and evaluate response  - Implement non-pharmacological measures as appropriate and evaluate response  - Consider cultural and social influences on pain and pain management  - Notify physician/advanced practitioner if interventions unsuccessful or patient reports new pain  Outcome: Progressing     Problem: INFECTION - ADULT  Goal: Absence or prevention of progression during hospitalization  Description: INTERVENTIONS:  - Assess and monitor for signs and symptoms of infection  - Monitor lab/diagnostic results  - Monitor all insertion sites, i e  indwelling lines, tubes, and drains  - Monitor endotracheal if appropriate and nasal secretions for changes in amount and color  - [place] appropriate cooling/warming therapies per order  - Administer medications as ordered  - Instruct and encourage patient and family to use good hand hygiene technique  - Identify and instruct in appropriate isolation precautions for identified infection/condition  Outcome: Progressing  Goal: Absence of fever/infection during neutropenic period  Description: INTERVENTIONS:  - Monitor WBC    Outcome: Progressing     Problem: SAFETY ADULT  Goal: Patient will remain free of falls  Description: INTERVENTIONS:  - Educate patient/family on patient safety including physical limitations  - Instruct patient to call for assistance with activity   - Consult OT/PT to assist with strengthening/mobility   - Keep Call bell within reach  - Keep bed low and locked with side rails adjusted as appropriate  - Keep care items and personal belongings within reach  - Initiate and maintain comfort rounds  - Make Fall Risk Sign visible to staff  - Offer Toileting every 2 Hours, in advance of need  - Initiate/Maintain bed/chair alarm  - Obtain necessary fall risk management equipment  - Apply yellow socks and bracelet for high fall risk patients  - Consider moving patient to room near nurses station  Outcome: Progressing  Goal: Maintain or return to baseline ADL function  Description: INTERVENTIONS:  - Educate patient/family on patient safety including physical limitations  - Instruct patient to call for assistance with activity   - Consult OT/PT to assist with strengthening/mobility   - Keep Call bell within reach  - Keep bed low and locked with side rails adjusted as appropriate  - Keep care items and personal belongings within reach  - Initiate and maintain comfort rounds  - Make Fall Risk Sign visible to staff  - Offer Toileting every 2 Hours, in advance of need  - Initiate/Maintain bed/chair alarm  - Obtain necessary fall risk management equipment  - Apply yellow socks and bracelet for high fall risk patients  - Consider moving patient to room near nurses station  Outcome: Progressing  Goal: Maintains/Returns to pre admission functional level  Description: INTERVENTIONS:  - Perform BMAT or MOVE assessment daily    - Set and communicate daily mobility goal to care team and patient/family/caregiver  - Collaborate with rehabilitation services on mobility goals if consulted  - Perform Range of Motion 3 times a day  - Reposition patient every 2 hours  - Dangle patient 3 times a day  - Stand patient 3 times a day  - Ambulate patient 3 times a day  - Out of bed to chair 3 times a day   - Out of bed for meals 3 times a day  - Out of bed for toileting  - Record patient progress and toleration of activity level   Outcome: Progressing     Problem: DISCHARGE PLANNING  Goal: Discharge to home or other facility with appropriate resources  Description: INTERVENTIONS:  - Identify barriers to discharge w/patient and caregiver  - Arrange for needed discharge resources and transportation as appropriate  - Identify discharge learning needs (meds, wound care, etc )  - Arrange for interpretive services to assist at discharge as needed  - Refer to Case Management Department for coordinating discharge planning if the patient needs post-hospital services based on physician/advanced practitioner order or complex needs related to functional status, cognitive ability, or social support system  Outcome: Progressing     Problem: Knowledge Deficit  Goal: Patient/family/caregiver demonstrates understanding of disease process, treatment plan, medications, and discharge instructions  Description: Complete learning assessment and assess knowledge base  Interventions:  - Provide teaching at level of understanding  - Provide teaching via preferred learning methods  Outcome: Progressing     Problem: Nutrition/Hydration-ADULT  Goal: Nutrient/Hydration intake appropriate for improving, restoring or maintaining nutritional needs  Description: Monitor and assess patient's nutrition/hydration status for malnutrition  Collaborate with interdisciplinary team and initiate plan and interventions as ordered    Monitor patient's weight and dietary intake as ordered or per policy  Utilize nutrition screening tool and intervene as necessary  Determine patient's food preferences and provide high-protein, high-caloric foods as appropriate  INTERVENTIONS:  - Monitor oral intake, urinary output, labs, and treatment plans  - Assess nutrition and hydration status and recommend course of action  - Evaluate amount of meals eaten  - Assist patient with eating if necessary   - Allow adequate time for meals  - Recommend/ encourage appropriate diets, oral nutritional supplements, and vitamin/mineral supplements  - Order, calculate, and assess calorie counts as needed  - Recommend, monitor, and adjust tube feedings and TPN/PPN based on assessed needs  - Assess need for intravenous fluids  - Provide specific nutrition/hydration education as appropriate  - Include patient/family/caregiver in decisions related to nutrition  Outcome: Progressing     Problem: MOBILITY - ADULT  Goal: Maintain or return to baseline ADL function  Description: INTERVENTIONS:  - Educate patient/family on patient safety including physical limitations  - Instruct patient to call for assistance with activity   - Consult OT/PT to assist with strengthening/mobility   - Keep Call bell within reach  - Keep bed low and locked with side rails adjusted as appropriate  - Keep care items and personal belongings within reach  - Initiate and maintain comfort rounds  - Make Fall Risk Sign visible to staff  - Offer Toileting every 2 Hours, in advance of need  - Initiate/Maintain bed/chair alarm  - Obtain necessary fall risk management equipment  - Apply yellow socks and bracelet for high fall risk patients  - Consider moving patient to room near nurses station  Outcome: Progressing  Goal: Maintains/Returns to pre admission functional level  Description: INTERVENTIONS:  - Perform BMAT or MOVE assessment daily    - Set and communicate daily mobility goal to care team and patient/family/caregiver     - Collaborate with rehabilitation services on mobility goals if consulted  - Perform Range of Motion 3 times a day  - Reposition patient every 2 hours    - Dangle patient 3 times a day  - Stand patient 3 times a day  - Ambulate patient 3 times a day  - Out of bed to chair 3 times a day   - Out of bed for meals 3 times a day  - Out of bed for toileting  - Record patient progress and toleration of activity level   Outcome: Progressing     Problem: Prexisting or High Potential for Compromised Skin Integrity  Goal: Skin integrity is maintained or improved  Description: INTERVENTIONS:  - Identify patients at risk for skin breakdown  - Assess and monitor skin integrity  - Assess and monitor nutrition and hydration status  - Monitor labs   - Assess for incontinence   - Turn and reposition patient  - Assist with mobility/ambulation  - Relieve pressure over bony prominences  - Avoid friction and shearing  - Provide appropriate hygiene as needed including keeping skin clean and dry  - Evaluate need for skin moisturizer/barrier cream  - Collaborate with interdisciplinary team   - Patient/family teaching  - Consider wound care consult   Outcome: Progressing

## 2022-10-23 NOTE — DISCHARGE SUMMARY
[address] [facility] 1955, 74 y o  male MRN: [id number]  Unit/Bed#: [facility] 629-01 Encounter: [id number]  Primary Care Provider: Chanelle Yap MD   Date and time admitted to hospital: 10/19/2022  6:33 AM    * Recurrent hypoglycemia  Assessment & Plan  Type 1 diabetic presented with encephalopathy in setting of hypoglycemia, required glucose from EMS  Was a rapid response 10/19 and required intubation secondary to symptomatic hypoglycemia/altered mental status  Blood sugar was 30, i-STAT less than 20  Subsequently extubated and transferred out of ICU  · Endocrinology following, insulin per endocrinology  · Plan to d/c on Lantus ($96 06/month)/Novolog pens, script sent to [facility] however pharmacy will need to be updated regarding final dose recommendations from endocrinology  Generic Novolog was indicated on the script as this is a bit cheaper at $63 08/month  · Has Dexcom outpatient  · Diabetic diet  · Monitor Accu-Cheks closely  · PT/OT cleared for home    Psychiatric disorder  Assessment & Plan  · Continue Risperdal/Zoloft home regimen    Anemia of chronic disease  Assessment & Plan  · Chronic and stable  History of stroke  Assessment & Plan  · Continue ASA/Eliquis/statin    Paroxysmal atrial fibrillation   Assessment & Plan  · Continue Lopressor and Eliquis for anticoagulation  Essential hypertension  Assessment & Plan  · Blood pressure acceptable  Continue Lopressor which he is on for history of PAF  · Continue midodrine, dose reduced and parameters ordered (he has not met the BP parameter for SBP < 130 mmhg for the last 2 days)  Adrenal insufficiency   Assessment & Plan  · Continue Florinef/Cortef regimen   · Also chronically on midodrine  Dose was reduced from 10 mg t i d  To 5 mg tid    · Endocrinology following    Insulin dependent type 1 diabetes mellitus   Assessment & Plan  Lab Results   Component Value Date    HGBA1C 7 5 (H) 07/22/2022     · Home insulin regimen Home regimen insulin NPH 18 units with breakfast, 2 units at bedtime, regular insulin 10 units with breakfast and dinner  · Plan to changed to basal/bolus regimen on discharge as above  · Plan as above    Medical Problems             Resolved Problems  Date Reviewed: 10/22/2022   None               Discharging Physician / Practitioner: ***  PCP: Bandar Guido MD  Admission Date:   Admission Orders (From admission, onward)     Ordered        10/19/22 [facility]  Once                      Discharge Date: ***    Consultations During Hospital Stay:  · endocrinology    Procedures Performed:   · Intubation 10/19, extubation 10/20    Significant Findings / Test Results:   XR abdomen 1 view kub   Final Result by Jacinto Rowland MD (10/20 4262)      Nasogastric tube is in the stomach  Workstation performed: [id number]         XR chest portable ICU   Final Result by Jacinto Rowland MD (10/20 9500)      Interstitial pulmonary edema  Endotracheal tube inserted into the trachea, with tip 1 9 cm above the beni  Nasogastric tube is in the stomach  Workstation performed: [id number]         CT head wo contrast   Final Result by Aditya Herrera MD (10/19 3528)      No acute intracranial abnormality  No interval change compared to prior earlier study dated same day  Workstation performed: [id number]         CT head without contrast   Final Result by Ingrid Matthews MD (10/19 0983)      1  No acute intracranial CT abnormality  2   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  3   Near complete opacification of right maxillary sinus containing hyperattenuating materials, likely inspissated secretions versus fungal colonization                    Workstation performed: [id number]           Results Reviewed     Procedure Component Value Units Date/Time    Fingerstick Glucose (POCT) [[id number]]  (Abnormal) Collected: 10/19/22 1059    Lab Status: Final result Updated: 10/19/22 1100     POC Glucose 196 mg/dl     Basic metabolic panel [[id number]]  (Abnormal) Collected: 10/19/22 0733    Lab Status: Final result Specimen: Blood from Arm, Right Updated: 10/19/22 0754     Sodium 144 mmol/L      Potassium 3 3 mmol/L      Chloride 111 mmol/L      CO2 27 mmol/L      ANION GAP 6 mmol/L      BUN 8 mg/dL      Creatinine 0 99 mg/dL      Glucose 155 mg/dL      Calcium 8 4 mg/dL      eGFR 79 ml/min/1 73sq m     Narrative:      [facility] guidelines for Chronic Kidney Disease (CKD):   •  Stage 1 with normal or high GFR (GFR > 90 mL/min/1 73 square meters)  •  Stage 2 Mild CKD (GFR = 60-89 mL/min/1 73 square meters)  •  Stage 3A Moderate CKD (GFR = 45-59 mL/min/1 73 square meters)  •  Stage 3B Moderate CKD (GFR = 30-44 mL/min/1 73 square meters)  •  Stage 4 Severe CKD (GFR = 15-29 mL/min/1 73 square meters)  •  Stage 5 End Stage CKD (GFR <15 mL/min/1 73 square meters)  Note: GFR calculation is accurate only with a steady state creatinine    CBC and differential [[id number]]  (Abnormal) Collected: 10/19/22 0733    Lab Status: Final result Specimen: Blood from Arm, Right Updated: 10/19/22 0745     WBC 11 82 Thousand/uL      RBC 2 90 Million/uL      Hemoglobin 9 2 g/dL      Hematocrit 28 7 %      MCV 99 fL      MCH 31 7 pg      MCHC 32 1 g/dL      RDW 13 3 %      MPV 10 8 fL      Platelets 618 Thousands/uL      nRBC 0 /100 WBCs      Neutrophils Relative 67 %      Immat GRANS % 1 %      Lymphocytes Relative 19 %      Monocytes Relative 11 %      Eosinophils Relative 2 %      Basophils Relative 0 %      Neutrophils Absolute 7 95 Thousands/µL      Immature Grans Absolute 0 08 Thousand/uL      Lymphocytes Absolute 2 24 Thousands/µL      Monocytes Absolute 1 27 Thousand/µL      Eosinophils Absolute 0 26 Thousand/µL      Basophils Absolute 0 02 Thousands/µL     Fingerstick Glucose (POCT) [[id number]]  (Abnormal) Collected: 10/19/22 0399    Lab Status: Final result Updated: 10/19/22 0637     POC Glucose 402 mg/dl             Incidental Findings:   · None     Test Results Pending at Discharge (will require follow up): · None     Outpatient Tests Requested:  · None    Complications:  hypogylcemia unresponsive episode 10/19 resulting in Rapid response and need for intubation  Subsequent extubation with improvement of BG and mentation  Reason for Admission: recurring hypoglycemia at home x 3 episodes in 1 week    Hospital Course:   Berta Monique is a 74 y o  male patient with past medical history type 1 diabetes on insulin adrenal insufficiency on chronic steroid therapy, hyperlipidemia, AFib anticoagulated with Eliquis, bipolar disorder who originally presented to the hospital on 10/19/2022 due to recurring hypoglycemia at home  Endocrinology was consulted and assisted in titration of insulin therapy as an inpatient  There was an episode of mismatch fingerstick blood glucose to random blood glucose being a puncture during rapid response when this patient was found unresponsive  His true blood glucose was less than 20, Critical Care was involved and intubated the patient until his blood glucose was stabilized and his mentation improved so that he was extubated within 24 hours  No infectious etiology was determined for his fluctuating blood glucose, only medication titration was necessary  CT head completed on admission showed old stroke, no acute abnormalities  This was again the case in the rapid response stat CT  ***     Patient has had all his questions answered and he is amenable to discharge to home today  Please see above list of diagnoses and related plan for additional information       Condition at Discharge: stable    Discharge Day Visit / Exam:   Subjective:  ***  Vitals: Blood Pressure: 169/90 (10/23/22 0746)  Pulse: 75 (10/23/22 0746)  Temperature: 98 °F (36 7 °C) (10/23/22 0746)  Temp Source: Oral (10/20/22 1600)  Respirations: 18 (10/23/22 0746)  Height: 5' 4" (162 6 cm) (10/19/22 1100)  Weight - Scale: 90 4 kg (199 lb 6 4 oz) (10/23/22 0532)  SpO2: 96 % (10/23/22 0746)  Exam:   Physical Exam ***    Discussion with Family: {Family Communication:40487}    Discharge instructions/Information to patient and family:   See after visit summary for information provided to patient and family  Provisions for Follow-Up Care:  See after visit summary for information related to follow-up care and any pertinent home health orders  Disposition:   Home    Planned Readmission: none     Discharge Statement:  I spent 45 minutes discharging the patient  This time was spent on the day of discharge  I had direct contact with the patient on the day of discharge  Greater than 50% of the total time was spent examining patient, answering all patient questions, arranging and discussing plan of care with patient as well as directly providing post-discharge instructions  Additional time then spent on discharge activities  Discharge Medications:  See after visit summary for reconciled discharge medications provided to patient and/or family        **Please Note: This note may have been constructed using a voice recognition system**

## 2022-10-23 NOTE — ASSESSMENT & PLAN NOTE
· Continue Florinef/Cortef regimen   · Also chronically on midodrine  Dose was reduced from 10 mg t i d  To 5 mg tid    · Endocrinology following

## 2022-10-23 NOTE — PROGRESS NOTES
Reny Stevens [address] 1955, 74 y o  male MRN: [id number]  Unit/Bed#: [facility] 629-01 Encounter: [id number]  Primary Care Provider: Uriah Martinez MD   Date and time admitted to hospital: 10/19/2022  6:33 AM    * Recurrent hypoglycemia  Assessment & Plan  Type 1 diabetic presented with encephalopathy in setting of hypoglycemia, required glucose from EMS  Was a rapid response 10/19 and required intubation secondary to symptomatic hypoglycemia/altered mental status  Blood sugar was 30, i-STAT less than 20  Subsequently extubated and transferred out of ICU  · Endocrinology following, insulin per endocrinology  · Plan to d/c on Lantus ($96 06/month)/Novolog pens, script sent to [facility] however pharmacy will need to be updated regarding final dose recommendations from endocrinology  Generic Novolog was indicated on the script as this is a bit cheaper at $63 08/month  · Has Dexcom outpatient  · Diabetic diet  · Monitor Accu-Cheks closely  · PT/OT cleared for home    Psychiatric disorder  Assessment & Plan  · Continue Risperdal/Zoloft home regimen    Anemia of chronic disease  Assessment & Plan  · Chronic and stable  History of stroke  Assessment & Plan  · Continue ASA/Eliquis/statin    Paroxysmal atrial fibrillation   Assessment & Plan  · Continue Lopressor and Eliquis for anticoagulation  Essential hypertension  Assessment & Plan  · Blood pressure acceptable  Continue Lopressor which he is on for history of PAF  · Will dc midodrine for now (he has not been using it because of not meeting hypotension BP parameters of SBP < 130 mmHg)  Will use Cortef and florinef alone and monitor  Adrenal insufficiency   Assessment & Plan  · Continue Florinef/Cortef regimen   · Also chronically on midodrine  Dose was reduced from 10 mg t i d  To 5 mg tid    · Endocrinology following    Insulin dependent type 1 diabetes mellitus   Assessment & Plan  Lab Results Component Value Date    HGBA1C 7 5 (H) 2022     · Home insulin regimen Home regimen insulin NPH 18 units with breakfast, 2 units at bedtime, regular insulin 10 units with breakfast and dinner  · Plan to changed to basal/bolus regimen on discharge as above  · Plan as above      VTE Pharmacologic Prophylaxis: VTE Score: 2 Low Risk (Score 0-2) - Encourage Ambulation  Patient Centered Rounds: I performed bedside rounds with nursing staff today  Discussions with Specialists or Other Care Team Provider: endo    Education and Discussions with Family / Patient: Patient declined call to   Time Spent for Care: 30 minutes  More than 50% of total time spent on counseling and coordination of care as described above  Current Length of Stay: 4 day(s)  Current Patient Status: Inpatient   Certification Statement: The patient will continue to require additional inpatient hospital stay due to titrating insulin per endo  Discharge Plan: Anticipate discharge in 24-48 hrs to home  Code Status: Level 1 - Full Code    Subjective:   No acute complaints    Moderate appetite    Making adequate urine    No lightheadedness, dizziness, vision changes, weakness, pre-syncope, diaphoresis, headache, AMS, abd pain or nausea or vomiting per RN  Objective:     Vitals:   Temp (24hrs), Av 4 °F (36 9 °C), Min:98 °F (36 7 °C), Max:98 8 °F (37 1 °C)    Temp:  [98 °F (36 7 °C)-98 8 °F (37 1 °C)] 98 °F (36 7 °C)  HR:  [66-75] 69  Resp:  [16-18] 18  BP: (140-169)/(57-90) 167/90  SpO2:  [92 %-98 %] 97 %  Body mass index is 34 23 kg/m²  Input and Output Summary (last 24 hours): Intake/Output Summary (Last 24 hours) at 10/23/2022 1400  Last data filed at 10/23/2022 1158  Gross per 24 hour   Intake 600 ml   Output 3825 ml   Net -3225 ml       Physical Exam:   Physical Exam  Vitals and nursing note reviewed     Constitutional:       Comments: Chronically ill appearing, awake and alert   HENT:      Head: Normocephalic and atraumatic  Nose: Nose normal       Mouth/Throat:      Mouth: Mucous membranes are dry  Eyes:      Extraocular Movements: Extraocular movements intact  Conjunctiva/sclera: Conjunctivae normal    Cardiovascular:      Rate and Rhythm: Normal rate and regular rhythm  Pulses: Normal pulses  Pulmonary:      Effort: Pulmonary effort is normal    Abdominal:      General: Bowel sounds are normal       Palpations: Abdomen is soft  Musculoskeletal:         General: No swelling or tenderness  Cervical back: Normal range of motion  Skin:     General: Skin is warm and dry  Neurological:      General: No focal deficit present  Mental Status: He is alert and oriented to person, place, and time  Psychiatric:         Speech: Speech is slurred  Comments: Flat affect          Additional Data:     Labs:  Results from last 7 days   Lab Units 10/21/22  0517   WBC Thousand/uL 8 11   HEMOGLOBIN g/dL 8 5*   HEMATOCRIT % 26 6*   PLATELETS Thousands/uL 173   NEUTROS PCT % 61   LYMPHS PCT % 22   MONOS PCT % 14*   EOS PCT % 3     Results from last 7 days   Lab Units 10/21/22  0517 10/20/22  0237 10/20/22  0024   SODIUM mmol/L 140   < > 141   POTASSIUM mmol/L 3 8   < > 3 7   CHLORIDE mmol/L 108   < > 111*   CO2 mmol/L 26   < > 29   BUN mg/dL 10   < > 9   CREATININE mg/dL 1 00   < > 1 10   ANION GAP mmol/L 6   < > 1*   CALCIUM mg/dL 8 4   < > 8 1*   ALBUMIN g/dL  --   --  2 7*   TOTAL BILIRUBIN mg/dL  --   --  0 45   ALK PHOS U/L  --   --  64   ALT U/L  --   --  15   AST U/L  --   --  7   GLUCOSE RANDOM mg/dL 200*   < > 27*    < > = values in this interval not displayed           Results from last 7 days   Lab Units 10/23/22  1156 10/23/22  0747 10/23/22  0206 10/22/22  2215 10/22/22  2040 10/22/22  1649 10/22/22  1204 10/22/22  0747 10/21/22  2144 10/21/22  1730 10/21/22  1054 10/21/22  0749   POC GLUCOSE mg/dl 307* 211* 223* 275* 325* 325* 340* 299* 327* 237* 208* 186*         Results from last 7 days   Lab Units 10/19/22  2208   LACTIC ACID mmol/L 1 3       Lines/Drains:  Invasive Devices  Report    Peripheral Intravenous Line  Duration           Peripheral IV 10/20/22 Distal;Left;Upper;Ventral (anterior) Arm 3 days    Peripheral IV 10/20/22 Right;Upper;Ventral (anterior) Arm 3 days                      Imaging: No pertinent imaging reviewed  Recent Cultures (last 7 days): none        Last 24 Hours Medication List:   Current Facility-Administered Medications   Medication Dose Route Frequency Provider Last Rate   • acetaminophen  650 mg Oral Q6H PRN Hetul Red, DO     • apixaban  5 mg Oral BID Hetul Red, DO     • aspirin  81 mg Oral Daily Hetul Red, DO     • atorvastatin  10 mg Oral Daily With [employer], DO     • chlorhexidine  15 mL Mouth/Throat Q12H [address] Hetul Red, DO     • fludrocortisone  0 2 mg Oral Daily Hetul Red, DO     • hydrocortisone  10 mg Oral BID Hetul Red, DO     • insulin glargine  5 Units Subcutaneous Q24H Andrea Obando MD     • insulin lispro  1-5 Units Subcutaneous TID AC Tony Carpenter MD     • insulin lispro  1-5 Units Subcutaneous TID AC NARA Elena     • insulin lispro  1-5 Units Subcutaneous HS NARA Elena     • insulin lispro  3 Units Subcutaneous TID With Meals Andrea Obando MD     • metoprolol tartrate  12 5 mg Oral BID Hetul Red, DO     • midodrine  5 mg Oral TID after meals NARA Winslow     • risperiDONE  0 5 mg Oral BID Hetul Red, DO     • sertraline  100 mg Oral Daily Hetul Red, DO          Today, Patient Was Seen By: Prabha Torres    **Please Note: This note may have been constructed using a voice recognition system  **

## 2022-10-23 NOTE — PROGRESS NOTES
Progress Note - Kervin Smith 74 y o  male MRN: [id number]    Unit/Bed#: [facility] 629-01 Encounter: [id number]      CC: diabetes f/u    Subjective: This is a 74y o -year-old male with Type 1 diabetes on longterm insulin (Nph and regular insulin), adrenal insufficiency on hydrocortisone admitted with hypoglycemia  Currently patient feels ok and reports good appetite  Objective:     Vitals: Blood pressure 167/90, pulse 69, temperature 98 °F (36 7 °C), resp  rate 18, height 5' 4" (1 626 m), weight 90 4 kg (199 lb 6 4 oz), SpO2 97 %  ,Body mass index is 34 23 kg/m²  Intake/Output Summary (Last 24 hours) at 10/23/2022 1224  Last data filed at 10/23/2022 0746  Gross per 24 hour   Intake 600 ml   Output 3425 ml   Net -2825 ml       Physical Exam:  General Appearance: awake, appears stated age and cooperative  Head: Normocephalic, without obvious abnormality, atraumatic  Extremities: moves all extremities  Skin: Skin color and temperature normal    Pulm: no labored breathing      POC Glucose (mg/dl)   Date Value   10/23/2022 307 (H)   10/23/2022 211 (H)   10/23/2022 223 (H)   10/22/2022 275 (H)   10/22/2022 325 (H)   10/22/2022 325 (H)   10/22/2022 340 (H)   10/22/2022 299 (H)   10/21/2022 327 (H)   10/21/2022 237 (H)     Assessment: This is a 74y o -year-old male with Type 1 diabetes on longterm insulin (Nph and regular insulin), adrenal insufficiency on hydrocortisone admitted with hypoglycemia        Plan:  # type 1 diabetes on long-term insulin  # hypoglycemia     A1c 7 5 (07/2022)  Home regimen insulin NPH 18 units with breakfast, 2 units at bedtime, regular insulin 10 units with breakfast and dinner      Recommendations:   Pt had episode of hyperglycemia through the day and reports good appetite  Recommend to increase lantus dose to 5 units daily at bedtime  Recommend to start lispro 3 units tid with meals, and recommend to continue  correctional scale to algorthim 2     Continue with accu checks     # adrenal insufficiency  History of secondary adrenal insufficiency on hydrocortisone 10 mg b i d (q9AM and q6PM) And fludrocortisone 0 2 mg daily   Continue on medications at home dose  Portions of the record may have been created with voice recognition software

## 2022-10-23 NOTE — ASSESSMENT & PLAN NOTE
· Blood pressure acceptable  Continue Lopressor which he is on for history of PAF  · Will dc midodrine for now (he has not been using it because of not meeting hypotension BP parameters of SBP < 130 mmHg)  Will use Cortef and florinef alone and monitor

## 2022-10-23 NOTE — ASSESSMENT & PLAN NOTE
Type 1 diabetic presented with encephalopathy in setting of hypoglycemia, required glucose from EMS  Was a rapid response 10/19 and required intubation secondary to symptomatic hypoglycemia/altered mental status  Blood sugar was 30, i-STAT less than 20  Subsequently extubated and transferred out of ICU  · Endocrinology following, insulin per endocrinology  · Plan to d/c on Lantus ($96 06/month)/Novolog pens, script sent to [facility] however pharmacy will need to be updated regarding final dose recommendations from endocrinology  Generic Novolog was indicated on the script as this is a bit cheaper at $63 08/month     · Has Dexcom outpatient  · Diabetic diet  · Monitor Accu-Cheks closely  · PT/OT cleared for home

## 2022-10-24 LAB
GLUCOSE SERPL-MCNC: 282 MG/DL (ref 65–140)
GLUCOSE SERPL-MCNC: 285 MG/DL (ref 65–140)
GLUCOSE SERPL-MCNC: 332 MG/DL (ref 65–140)
GLUCOSE SERPL-MCNC: 360 MG/DL (ref 65–140)
PROINSULIN SERPL-SCNC: <0.3 PMOL/L (ref 0–10)

## 2022-10-24 PROCEDURE — 99232 SBSQ HOSP IP/OBS MODERATE 35: CPT | Performed by: INTERNAL MEDICINE

## 2022-10-24 PROCEDURE — 99232 SBSQ HOSP IP/OBS MODERATE 35: CPT | Performed by: STUDENT IN AN ORGANIZED HEALTH CARE EDUCATION/TRAINING PROGRAM

## 2022-10-24 PROCEDURE — 82948 REAGENT STRIP/BLOOD GLUCOSE: CPT

## 2022-10-24 RX ORDER — INSULIN LISPRO 100 [IU]/ML
4 INJECTION, SOLUTION INTRAVENOUS; SUBCUTANEOUS
Status: DISCONTINUED | OUTPATIENT
Start: 2022-10-24 | End: 2022-10-25

## 2022-10-24 RX ORDER — INSULIN GLARGINE 100 [IU]/ML
6 INJECTION, SOLUTION SUBCUTANEOUS
Status: DISCONTINUED | OUTPATIENT
Start: 2022-10-24 | End: 2022-10-25

## 2022-10-24 RX ADMIN — INSULIN LISPRO 3 UNITS: 100 INJECTION, SOLUTION INTRAVENOUS; SUBCUTANEOUS at 13:11

## 2022-10-24 RX ADMIN — FLUDROCORTISONE ACETATE 0.2 MG: 0.1 TABLET ORAL at 10:17

## 2022-10-24 RX ADMIN — Medication 12.5 MG: at 17:36

## 2022-10-24 RX ADMIN — INSULIN LISPRO 2 UNITS: 100 INJECTION, SOLUTION INTRAVENOUS; SUBCUTANEOUS at 07:45

## 2022-10-24 RX ADMIN — APIXABAN 5 MG: 5 TABLET, FILM COATED ORAL at 10:17

## 2022-10-24 RX ADMIN — INSULIN GLARGINE 6 UNITS: 100 INJECTION, SOLUTION SUBCUTANEOUS at 21:35

## 2022-10-24 RX ADMIN — INSULIN LISPRO 2 UNITS: 100 INJECTION, SOLUTION INTRAVENOUS; SUBCUTANEOUS at 13:11

## 2022-10-24 RX ADMIN — Medication 12.5 MG: at 10:17

## 2022-10-24 RX ADMIN — INSULIN LISPRO 3 UNITS: 100 INJECTION, SOLUTION INTRAVENOUS; SUBCUTANEOUS at 07:45

## 2022-10-24 RX ADMIN — RISPERIDONE 0.5 MG: 0.25 TABLET ORAL at 17:37

## 2022-10-24 RX ADMIN — HYDROCORTISONE 10 MG: 10 TABLET ORAL at 17:38

## 2022-10-24 RX ADMIN — SERTRALINE 100 MG: 100 TABLET, FILM COATED ORAL at 10:17

## 2022-10-24 RX ADMIN — ASPIRIN 81 MG CHEWABLE TABLET 81 MG: 81 TABLET CHEWABLE at 10:17

## 2022-10-24 RX ADMIN — ATORVASTATIN CALCIUM 10 MG: 10 TABLET, FILM COATED ORAL at 17:37

## 2022-10-24 RX ADMIN — HYDROCORTISONE 10 MG: 10 TABLET ORAL at 10:17

## 2022-10-24 RX ADMIN — INSULIN LISPRO 4 UNITS: 100 INJECTION, SOLUTION INTRAVENOUS; SUBCUTANEOUS at 17:38

## 2022-10-24 RX ADMIN — CHLORHEXIDINE GLUCONATE 15 ML: 1.2 SOLUTION ORAL at 10:17

## 2022-10-24 RX ADMIN — RISPERIDONE 0.5 MG: 0.25 TABLET ORAL at 10:17

## 2022-10-24 RX ADMIN — APIXABAN 5 MG: 5 TABLET, FILM COATED ORAL at 17:37

## 2022-10-24 RX ADMIN — INSULIN LISPRO 3 UNITS: 100 INJECTION, SOLUTION INTRAVENOUS; SUBCUTANEOUS at 21:34

## 2022-10-24 RX ADMIN — CHLORHEXIDINE GLUCONATE 15 ML: 1.2 SOLUTION ORAL at 21:34

## 2022-10-24 RX ADMIN — INSULIN LISPRO 3 UNITS: 100 INJECTION, SOLUTION INTRAVENOUS; SUBCUTANEOUS at 17:38

## 2022-10-24 NOTE — CASE MANAGEMENT
Case Management Discharge Planning Note    Patient name Jacqui Miller  [facility] 398-42 MRN [id number]  : 1955 Date 10/24/2022       Current Admission Date: 10/19/2022  Current Admission Diagnosis:Recurrent hypoglycemia   Patient Active Problem List    Diagnosis Date Noted   • Recurrent hypoglycemia 10/19/2022   • Insulin dependent type 1 diabetes mellitus  10/19/2022   • Adrenal insufficiency  10/19/2022   • Essential hypertension 10/19/2022   • Paroxysmal atrial fibrillation  10/19/2022   • History of stroke 10/19/2022   • Anemia of chronic disease 10/19/2022   • Psychiatric disorder 10/19/2022      LOS (days): 5  Geometric Mean LOS (GMLOS) (days): 3 90  Days to GMLOS:-1 1     OBJECTIVE:  Risk of Unplanned Readmission Score: 14 4         Current admission status: Inpatient   Preferred Pharmacy:   [address], [address]  [address] [facility] [address]  Phone: [phone] Fax: [facility] [address] [place] [address] [facility]  Phone: [phone] Fax: [phone]    Primary Care Provider: Rachel Sanford MD    Primary Insurance: [facility] [address] REP  Secondary Insurance:     DISCHARGE DETAILS:    [address]         Is the patient interested in [facility] at discharge?: Yes  [address] requested[de-identified] Nursing, Occupational Therapy, Physical [address] Name[de-identified] Other ([facility] home care)  [address] [place] [address] Provider[de-identified] PCP  Home Health Services Needed[de-identified] Evaluate Functional Status and Safety, Strengthening/Theraputic Exercises to Improve Function, Other (comment) (new start insulin)  Homebound Criteria Met[de-identified] Requires the Assistance of Another Person for Safe Ambulation or to Leave the Home, Uses an Assist Device (i e  cane, walker, etc)  Supporting Clincal Findings[de-identified] Limited Endurance, Fatigues Easliy in [employer]

## 2022-10-24 NOTE — ASSESSMENT & PLAN NOTE
Type 1 diabetic presented with encephalopathy in setting of hypoglycemia, required glucose from EMS  Was a rapid response 10/19 and required intubation secondary to symptomatic hypoglycemia/altered mental status  Blood sugar was 30, i-STAT less than 20  Subsequently extubated and transferred out of ICU  · Endocrinology following, insulin per endocrinology    · Diabetic diet  · Monitor Accu-Cheks closely  · PT/OT cleared for home Wartpeel Counseling:  I discussed with the patient the risks of Wartpeel including but not limited to erythema, scaling, itching, weeping, crusting, and pain.

## 2022-10-24 NOTE — PROGRESS NOTES
Reny Stevens [id number] 1955, 74 y o  male MRN: [id number]  Unit/Bed#: [facility] 629-01 Encounter: [id number]  Primary Care Provider: Fe Ozuna MD   Date and time admitted to hospital: 10/19/2022  6:33 AM    * Recurrent hypoglycemia  Assessment & Plan  Type 1 diabetic presented with encephalopathy in setting of hypoglycemia, required glucose from EMS  Was a rapid response 10/19 and required intubation secondary to symptomatic hypoglycemia/altered mental status  Blood sugar was 30, i-STAT less than 20  Subsequently extubated and transferred out of ICU  · Endocrinology following, insulin per endocrinology  · Diabetic diet  · Monitor Accu-Cheks closely  · PT/OT cleared for home    Insulin dependent type 1 diabetes mellitus   Assessment & Plan  Lab Results   Component Value Date    HGBA1C 7 5 (H) 07/22/2022     Recent Labs     10/23/22  1156 10/23/22  1640 10/23/22  2044 10/24/22  0742 10/24/22  1237 10/24/22  1653   POCGLU 307* 322* 351* 282* 285* 332*     Management per endocrinology  Glargine 6U HS  Lispro 4U TID  Sliding scale    · Home insulin regimen Home regimen insulin NPH 18 units with breakfast, 2 units at bedtime, regular insulin 10 units with breakfast and dinner  · Plan to changed to basal/bolus regimen on discharge as above  · Plan as above    Psychiatric disorder  Assessment & Plan  · Continue Risperdal/Zoloft home regimen    Anemia of chronic disease  Assessment & Plan  Stable, no indication for transfusion at this time      History of stroke  Assessment & Plan  · Continue ASA/Eliquis/statin    Paroxysmal atrial fibrillation   Assessment & Plan  Rate control: Continue Lopressor  AC: Eliquis    Essential hypertension  Assessment & Plan  Controlled  · Currently on Cortef and florinef for AI  · Metoprolol BID    Adrenal insufficiency   Assessment & Plan  · Continue Florinef/Cortef regimen   · Also chronically on midodrine    Dose was reduced from 10 mg t i d  To 5 mg tid PRN  · Endocrinology following      VTE Pharmacologic Prophylaxis:   Pharmacologic: Apixaban (Eliquis)  Mechanical VTE Prophylaxis in Place: Yes    Discussions with Specialists or Other Care Team Provider: nursing    Education and Discussions with Family / Patient: patient, daughter    Time Spent for Care: 30 minutes  More than 50% of total time spent on counseling and coordination of care as described above  Current Length of Stay: 5 day(s)    Current Patient Status: Inpatient   Certification Statement: The patient will continue to require additional inpatient hospital stay due to insulin adjustment, endo reeval    Discharge Plan: active    Code Status: Level 1 - Full Code      Subjective:   Patient seen and examined at bedside  Comfortable, no new issues overnight  Objective:     Vitals:   Temp (24hrs), Av 7 °F (36 5 °C), Min:97 4 °F (36 3 °C), Max:98 °F (36 7 °C)    Temp:  [97 4 °F (36 3 °C)-98 °F (36 7 °C)] 98 °F (36 7 °C)  HR:  [65-79] 77  Resp:  [16] 16  BP: (122-171)/(62-91) 148/76  SpO2:  [97 %-99 %] 98 %  Body mass index is 34 23 kg/m²  Input and Output Summary (last 24 hours):     No intake or output data in the 24 hours ending 10/24/22 1681    Physical Exam:     Physical Exam  Vitals reviewed  Constitutional:       General: He is not in acute distress  HENT:      Head: Normocephalic  Nose: Nose normal       Mouth/Throat:      Mouth: Mucous membranes are moist    Eyes:      General: No scleral icterus  Cardiovascular:      Rate and Rhythm: Normal rate  Pulmonary:      Effort: Pulmonary effort is normal  No respiratory distress  Abdominal:      General: There is no distension  Palpations: Abdomen is soft  Tenderness: There is no abdominal tenderness  Skin:     General: Skin is warm  Neurological:      Mental Status: He is alert  He is disoriented        Comments: Oriented to place, but not to year   Psychiatric:         Mood and Affect: Mood normal          Behavior: Behavior normal        Additional Data:     Labs:    Results from last 7 days   Lab Units 10/21/22  0517   WBC Thousand/uL 8 11   HEMOGLOBIN g/dL 8 5*   HEMATOCRIT % 26 6*   PLATELETS Thousands/uL 173   NEUTROS PCT % 61   LYMPHS PCT % 22   MONOS PCT % 14*   EOS PCT % 3     Results from last 7 days   Lab Units 10/21/22  0517 10/20/22  0237 10/20/22  0024   SODIUM mmol/L 140   < > 141   POTASSIUM mmol/L 3 8   < > 3 7   CHLORIDE mmol/L 108   < > 111*   CO2 mmol/L 26   < > 29   BUN mg/dL 10   < > 9   CREATININE mg/dL 1 00   < > 1 10   ANION GAP mmol/L 6   < > 1*   CALCIUM mg/dL 8 4   < > 8 1*   ALBUMIN g/dL  --   --  2 7*   TOTAL BILIRUBIN mg/dL  --   --  0 45   ALK PHOS U/L  --   --  64   ALT U/L  --   --  15   AST U/L  --   --  7   GLUCOSE RANDOM mg/dL 200*   < > 27*    < > = values in this interval not displayed  Results from last 7 days   Lab Units 10/24/22  1653 10/24/22  1237 10/24/22  0742 10/23/22  2044 10/23/22  1640 10/23/22  1156 10/23/22  0747 10/23/22  0206 10/22/22  2215 10/22/22  2040 10/22/22  1649 10/22/22  1204   POC GLUCOSE mg/dl 332* 285* 282* 351* 322* 307* 211* 223* 275* 325* 325* 340*         Results from last 7 days   Lab Units 10/19/22  2208   LACTIC ACID mmol/L 1 3           * I Have Reviewed All Lab Data Listed Above  * Additional Pertinent Lab Tests Reviewed:  Christian 68 Admission Reviewed      Lines:  Invasive Devices  Report    Peripheral Intravenous Line  Duration           Peripheral IV 10/20/22 Distal;Left;Upper;Ventral (anterior) Arm 4 days               Imaging:    Imaging Reports Reviewed Today Include: No new imaging    Recent Cultures (last 7 days):           Last 24 Hours Medication List:   Current Facility-Administered Medications   Medication Dose Route Frequency Provider Last Rate   • acetaminophen  650 mg Oral Q6H PRN Hetul Red, DO     • apixaban  5 mg Oral BID Hetul Red, DO     • aspirin  81 mg Oral Daily Hetul Lila Lowe, DO     • atorvastatin  10 mg Oral Daily With [employer], DO     • chlorhexidine  15 mL Mouth/Throat Q12H [facility] Hetul Red, DO     • fludrocortisone  0 2 mg Oral Daily Hetul Red, DO     • hydrocortisone  10 mg Oral BID Hetul Red, DO     • insulin glargine  6 Units Subcutaneous HS Zuleyka Corona MD     • insulin lispro  1-5 Units Subcutaneous TID AC Tony Carpenter MD     • insulin lispro  1-5 Units Subcutaneous TID AC NRAA Elena     • insulin lispro  1-5 Units Subcutaneous HS NARA Elena     • insulin lispro  4 Units Subcutaneous TID With Meals Kayla Brooke Riggins MD     • metoprolol tartrate  12 5 mg Oral BID Hetul Teofilo, DO     • midodrine  5 mg Oral TID PRN Belgica Garcia PA-C     • risperiDONE  0 5 mg Oral BID Hetrodríguez Red, DO     • sertraline  100 mg Oral Daily Opal Red DO          Today, Patient Was Seen By: Tree Pritchett    ** Please Note: Dictation voice to text software may have been used in the creation of this document   **

## 2022-10-24 NOTE — PROGRESS NOTES
Progress Note - Demetria Tillman 74 y o  male MRN: [id number]    Unit/Bed#: [facility] 629-01 Encounter: [id number]      CC: diabetes f/u    Subjective: This is a 74y o -year-old male with Type 1 diabetes on longterm insulin, adrenal insufficiency on hydrocortisone admitted with hypoglycemia  Feels well  No complaints  No hypoglycemia  Objective:     Vitals: Blood pressure 142/75, pulse 67, temperature 98 °F (36 7 °C), resp  rate 16, height 5' 4" (1 626 m), weight 90 4 kg (199 lb 6 4 oz), SpO2 98 %  ,Body mass index is 34 23 kg/m²  Intake/Output Summary (Last 24 hours) at 10/24/2022 1646  Last data filed at 10/23/2022 1700  Gross per 24 hour   Intake --   Output 500 ml   Net -500 ml       Physical Exam:  General Appearance: awake, appears stated age and cooperative  Head: Normocephalic, without obvious abnormality, atraumatic  Extremities: moves all extremities  Skin: Skin color and temperature normal    Pulm: no labored breathing    Lab, Imaging and other studies: I have personally reviewed pertinent reports  POC Glucose (mg/dl)   Date Value   10/24/2022 285 (H)   10/24/2022 282 (H)   10/23/2022 351 (H)   10/23/2022 322 (H)   10/23/2022 307 (H)   10/23/2022 211 (H)   10/23/2022 223 (H)   10/22/2022 275 (H)   10/22/2022 325 (H)   10/22/2022 325 (H)       Assessment and plan:    Type 1 Diabetes mellitus  HbA1c: 7 5 (07/2022)  Home regimen insulin NPH 18 units with breakfast, 2 units at bedtime, regular insulin 10 units with breakfast and dinner    Inpatient regimen: Lantus 5U at bedtime and lispro 3U TID    Recommendations:  BG above target range  Increase Lantus to 6U from 5U at bedtime  Increase lispro to 4U TID w/ meals with from 3U TID w/ meals  C/w correctional scale algo 1 with meals and at bedtime  Will cont to follow and make adjustments as needed    Secondary Adrenal insufficiency  C/w hydrocortisone 10mg BID and fludrocortisone 0 2mg daily    Portions of the record may have been created with voice recognition software

## 2022-10-24 NOTE — ASSESSMENT & PLAN NOTE
· Continue Florinef/Cortef regimen   · Also chronically on midodrine  Dose was reduced from 10 mg t i d   To 5 mg tid PRN  · Endocrinology following

## 2022-10-24 NOTE — ASSESSMENT & PLAN NOTE
Lab Results   Component Value Date    HGBA1C 7 5 (H) 07/22/2022     Recent Labs     10/23/22  1156 10/23/22  1640 10/23/22  2044 10/24/22  0742 10/24/22  1237 10/24/22  1653   POCGLU 307* 322* 351* 282* 285* 332*     Management per endocrinology  Glargine 6U HS  Lispro 4U TID  Sliding scale    · Home insulin regimen Home regimen insulin NPH 18 units with breakfast, 2 units at bedtime, regular insulin 10 units with breakfast and dinner    · Plan to changed to basal/bolus regimen on discharge as above  · Plan as above

## 2022-10-24 NOTE — PLAN OF CARE
Problem: Potential for Falls  Goal: Patient will remain free of falls  Description: INTERVENTIONS:  - Educate patient/family on patient safety including physical limitations  - Instruct patient to call for assistance with activity   - Consult OT/PT to assist with strengthening/mobility   - Keep Call bell within reach  - Keep bed low and locked with side rails adjusted as appropriate  - Keep care items and personal belongings within reach  - Initiate and maintain comfort rounds  - Make Fall Risk Sign visible to staff  - Offer Toileting every 2 Hours, in advance of need  - Initiate/Maintain bed/chair alarm  - Obtain necessary fall risk management equipment  - Apply yellow socks and bracelet for high fall risk patients  - Consider moving patient to room near nurses station  Outcome: Progressing     Problem: PAIN - ADULT  Goal: Verbalizes/displays adequate comfort level or baseline comfort level  Description: Interventions:  - Encourage patient to monitor pain and request assistance  - Assess pain using appropriate pain scale  - Administer analgesics based on type and severity of pain and evaluate response  - Implement non-pharmacological measures as appropriate and evaluate response  - Consider cultural and social influences on pain and pain management  - Notify physician/advanced practitioner if interventions unsuccessful or patient reports new pain  Outcome: Progressing     Problem: INFECTION - ADULT  Goal: Absence or prevention of progression during hospitalization  Description: INTERVENTIONS:  - Assess and monitor for signs and symptoms of infection  - Monitor lab/diagnostic results  - Monitor all insertion sites, i e  indwelling lines, tubes, and drains  - Monitor endotracheal if appropriate and nasal secretions for changes in amount and color  - [place] appropriate cooling/warming therapies per order  - Administer medications as ordered  - Instruct and encourage patient and family to use good hand hygiene technique  - Identify and instruct in appropriate isolation precautions for identified infection/condition  Outcome: Progressing  Goal: Absence of fever/infection during neutropenic period  Description: INTERVENTIONS:  - Monitor WBC    Outcome: Progressing     Problem: SAFETY ADULT  Goal: Patient will remain free of falls  Description: INTERVENTIONS:  - Educate patient/family on patient safety including physical limitations  - Instruct patient to call for assistance with activity   - Consult OT/PT to assist with strengthening/mobility   - Keep Call bell within reach  - Keep bed low and locked with side rails adjusted as appropriate  - Keep care items and personal belongings within reach  - Initiate and maintain comfort rounds  - Make Fall Risk Sign visible to staff  - Offer Toileting every 2 Hours, in advance of need  - Initiate/Maintain bed/chair alarm  - Obtain necessary fall risk management equipment  - Apply yellow socks and bracelet for high fall risk patients  - Consider moving patient to room near nurses station  Outcome: Progressing  Goal: Maintain or return to baseline ADL function  Description: INTERVENTIONS:  - Educate patient/family on patient safety including physical limitations  - Instruct patient to call for assistance with activity   - Consult OT/PT to assist with strengthening/mobility   - Keep Call bell within reach  - Keep bed low and locked with side rails adjusted as appropriate  - Keep care items and personal belongings within reach  - Initiate and maintain comfort rounds  - Make Fall Risk Sign visible to staff  - Offer Toileting every 2 Hours, in advance of need  - Initiate/Maintain bed/chair alarm  - Obtain necessary fall risk management equipment  - Apply yellow socks and bracelet for high fall risk patients  - Consider moving patient to room near nurses station  Outcome: Progressing  Goal: Maintains/Returns to pre admission functional level  Description: INTERVENTIONS:  - Perform BMAT or MOVE assessment daily    - Set and communicate daily mobility goal to care team and patient/family/caregiver  - Collaborate with rehabilitation services on mobility goals if consulted  - Perform Range of Motion 3 times a day  - Reposition patient every 2 hours  - Dangle patient 3 times a day  - Stand patient 3 times a day  - Ambulate patient 3 times a day  - Out of bed to chair 3 times a day   - Out of bed for meals 3 times a day  - Out of bed for toileting  - Record patient progress and toleration of activity level   Outcome: Progressing     Problem: DISCHARGE PLANNING  Goal: Discharge to home or other facility with appropriate resources  Description: INTERVENTIONS:  - Identify barriers to discharge w/patient and caregiver  - Arrange for needed discharge resources and transportation as appropriate  - Identify discharge learning needs (meds, wound care, etc )  - Arrange for interpretive services to assist at discharge as needed  - Refer to Case Management Department for coordinating discharge planning if the patient needs post-hospital services based on physician/advanced practitioner order or complex needs related to functional status, cognitive ability, or social support system  Outcome: Progressing     Problem: Knowledge Deficit  Goal: Patient/family/caregiver demonstrates understanding of disease process, treatment plan, medications, and discharge instructions  Description: Complete learning assessment and assess knowledge base  Interventions:  - Provide teaching at level of understanding  - Provide teaching via preferred learning methods  Outcome: Progressing     Problem: Nutrition/Hydration-ADULT  Goal: Nutrient/Hydration intake appropriate for improving, restoring or maintaining nutritional needs  Description: Monitor and assess patient's nutrition/hydration status for malnutrition  Collaborate with interdisciplinary team and initiate plan and interventions as ordered    Monitor patient's weight and dietary intake as ordered or per policy  Utilize nutrition screening tool and intervene as necessary  Determine patient's food preferences and provide high-protein, high-caloric foods as appropriate  INTERVENTIONS:  - Monitor oral intake, urinary output, labs, and treatment plans  - Assess nutrition and hydration status and recommend course of action  - Evaluate amount of meals eaten  - Assist patient with eating if necessary   - Allow adequate time for meals  - Recommend/ encourage appropriate diets, oral nutritional supplements, and vitamin/mineral supplements  - Order, calculate, and assess calorie counts as needed  - Recommend, monitor, and adjust tube feedings and TPN/PPN based on assessed needs  - Assess need for intravenous fluids  - Provide specific nutrition/hydration education as appropriate  - Include patient/family/caregiver in decisions related to nutrition  Outcome: Progressing     Problem: MOBILITY - ADULT  Goal: Maintain or return to baseline ADL function  Description: INTERVENTIONS:  - Educate patient/family on patient safety including physical limitations  - Instruct patient to call for assistance with activity   - Consult OT/PT to assist with strengthening/mobility   - Keep Call bell within reach  - Keep bed low and locked with side rails adjusted as appropriate  - Keep care items and personal belongings within reach  - Initiate and maintain comfort rounds  - Make Fall Risk Sign visible to staff  - Offer Toileting every 2 Hours, in advance of need  - Initiate/Maintain bed/chair alarm  - Obtain necessary fall risk management equipment  - Apply yellow socks and bracelet for high fall risk patients  - Consider moving patient to room near nurses station  Outcome: Progressing  Goal: Maintains/Returns to pre admission functional level  Description: INTERVENTIONS:  - Perform BMAT or MOVE assessment daily    - Set and communicate daily mobility goal to care team and patient/family/caregiver     - Collaborate with rehabilitation services on mobility goals if consulted  - Perform Range of Motion 3 times a day  - Reposition patient every 2 hours    - Dangle patient 3 times a day  - Stand patient 3 times a day  - Ambulate patient 3 times a day  - Out of bed to chair 3 times a day   - Out of bed for meals 3 times a day  - Out of bed for toileting  - Record patient progress and toleration of activity level   Outcome: Progressing     Problem: Prexisting or High Potential for Compromised Skin Integrity  Goal: Skin integrity is maintained or improved  Description: INTERVENTIONS:  - Identify patients at risk for skin breakdown  - Assess and monitor skin integrity  - Assess and monitor nutrition and hydration status  - Monitor labs   - Assess for incontinence   - Turn and reposition patient  - Assist with mobility/ambulation  - Relieve pressure over bony prominences  - Avoid friction and shearing  - Provide appropriate hygiene as needed including keeping skin clean and dry  - Evaluate need for skin moisturizer/barrier cream  - Collaborate with interdisciplinary team   - Patient/family teaching  - Consider wound care consult   Outcome: Progressing

## 2022-10-25 LAB
ANION GAP SERPL CALCULATED.3IONS-SCNC: 6 MMOL/L (ref 4–13)
BUN SERPL-MCNC: 8 MG/DL (ref 5–25)
CALCIUM SERPL-MCNC: 8.6 MG/DL (ref 8.3–10.1)
CHLORIDE SERPL-SCNC: 106 MMOL/L (ref 96–108)
CO2 SERPL-SCNC: 27 MMOL/L (ref 21–32)
CREAT SERPL-MCNC: 1.31 MG/DL (ref 0.6–1.3)
GFR SERPL CREATININE-BSD FRML MDRD: 56 ML/MIN/1.73SQ M
GLUCOSE SERPL-MCNC: 101 MG/DL (ref 65–140)
GLUCOSE SERPL-MCNC: 116 MG/DL (ref 65–140)
GLUCOSE SERPL-MCNC: 180 MG/DL (ref 65–140)
GLUCOSE SERPL-MCNC: 253 MG/DL (ref 65–140)
GLUCOSE SERPL-MCNC: 267 MG/DL (ref 65–140)
GLUCOSE SERPL-MCNC: 336 MG/DL (ref 65–140)
MAGNESIUM SERPL-MCNC: 1.7 MG/DL (ref 1.6–2.6)
POTASSIUM SERPL-SCNC: 3.4 MMOL/L (ref 3.5–5.3)
SODIUM SERPL-SCNC: 139 MMOL/L (ref 135–147)

## 2022-10-25 PROCEDURE — 99231 SBSQ HOSP IP/OBS SF/LOW 25: CPT | Performed by: INTERNAL MEDICINE

## 2022-10-25 PROCEDURE — 99232 SBSQ HOSP IP/OBS MODERATE 35: CPT | Performed by: STUDENT IN AN ORGANIZED HEALTH CARE EDUCATION/TRAINING PROGRAM

## 2022-10-25 PROCEDURE — 83735 ASSAY OF MAGNESIUM: CPT | Performed by: STUDENT IN AN ORGANIZED HEALTH CARE EDUCATION/TRAINING PROGRAM

## 2022-10-25 PROCEDURE — 97110 THERAPEUTIC EXERCISES: CPT

## 2022-10-25 PROCEDURE — 97530 THERAPEUTIC ACTIVITIES: CPT

## 2022-10-25 PROCEDURE — 82948 REAGENT STRIP/BLOOD GLUCOSE: CPT

## 2022-10-25 PROCEDURE — 97116 GAIT TRAINING THERAPY: CPT

## 2022-10-25 PROCEDURE — 80048 BASIC METABOLIC PNL TOTAL CA: CPT | Performed by: STUDENT IN AN ORGANIZED HEALTH CARE EDUCATION/TRAINING PROGRAM

## 2022-10-25 RX ORDER — INSULIN GLARGINE 100 [IU]/ML
4 INJECTION, SOLUTION SUBCUTANEOUS
Qty: 1.5 ML | Refills: 0 | Status: CANCELLED | OUTPATIENT
Start: 2022-10-25 | End: 2022-12-02

## 2022-10-25 RX ORDER — INSULIN LISPRO 100 [IU]/ML
5 INJECTION, SOLUTION INTRAVENOUS; SUBCUTANEOUS
Status: DISCONTINUED | OUTPATIENT
Start: 2022-10-25 | End: 2022-10-26

## 2022-10-25 RX ORDER — INSULIN ASPART 100 [IU]/ML
2 INJECTION, SOLUTION INTRAVENOUS; SUBCUTANEOUS
Qty: 1.8 ML | Refills: 0 | Status: CANCELLED | OUTPATIENT
Start: 2022-10-25 | End: 2022-11-24

## 2022-10-25 RX ORDER — INSULIN GLARGINE 100 [IU]/ML
8 INJECTION, SOLUTION SUBCUTANEOUS
Status: DISCONTINUED | OUTPATIENT
Start: 2022-10-25 | End: 2022-10-26

## 2022-10-25 RX ORDER — POTASSIUM CHLORIDE 20MEQ/15ML
20 LIQUID (ML) ORAL
Status: COMPLETED | OUTPATIENT
Start: 2022-10-25 | End: 2022-10-25

## 2022-10-25 RX ADMIN — ASPIRIN 81 MG CHEWABLE TABLET 81 MG: 81 TABLET CHEWABLE at 10:04

## 2022-10-25 RX ADMIN — INSULIN GLARGINE 8 UNITS: 100 INJECTION, SOLUTION SUBCUTANEOUS at 21:45

## 2022-10-25 RX ADMIN — POTASSIUM CHLORIDE 20 MEQ: 20 SOLUTION ORAL at 18:31

## 2022-10-25 RX ADMIN — FLUDROCORTISONE ACETATE 0.2 MG: 0.1 TABLET ORAL at 10:04

## 2022-10-25 RX ADMIN — POTASSIUM CHLORIDE 20 MEQ: 20 SOLUTION ORAL at 16:52

## 2022-10-25 RX ADMIN — APIXABAN 5 MG: 5 TABLET, FILM COATED ORAL at 17:04

## 2022-10-25 RX ADMIN — INSULIN LISPRO 3 UNITS: 100 INJECTION, SOLUTION INTRAVENOUS; SUBCUTANEOUS at 11:57

## 2022-10-25 RX ADMIN — RISPERIDONE 0.5 MG: 0.25 TABLET ORAL at 17:04

## 2022-10-25 RX ADMIN — CHLORHEXIDINE GLUCONATE 15 ML: 1.2 SOLUTION ORAL at 10:04

## 2022-10-25 RX ADMIN — HYDROCORTISONE 10 MG: 10 TABLET ORAL at 17:08

## 2022-10-25 RX ADMIN — INSULIN LISPRO 4 UNITS: 100 INJECTION, SOLUTION INTRAVENOUS; SUBCUTANEOUS at 11:58

## 2022-10-25 RX ADMIN — RISPERIDONE 0.5 MG: 0.25 TABLET ORAL at 10:04

## 2022-10-25 RX ADMIN — INSULIN LISPRO 2 UNITS: 100 INJECTION, SOLUTION INTRAVENOUS; SUBCUTANEOUS at 10:04

## 2022-10-25 RX ADMIN — APIXABAN 5 MG: 5 TABLET, FILM COATED ORAL at 10:04

## 2022-10-25 RX ADMIN — ATORVASTATIN CALCIUM 10 MG: 10 TABLET, FILM COATED ORAL at 17:04

## 2022-10-25 RX ADMIN — HYDROCORTISONE 10 MG: 10 TABLET ORAL at 10:03

## 2022-10-25 RX ADMIN — SERTRALINE 100 MG: 100 TABLET, FILM COATED ORAL at 10:04

## 2022-10-25 RX ADMIN — Medication 12.5 MG: at 10:04

## 2022-10-25 RX ADMIN — INSULIN LISPRO 4 UNITS: 100 INJECTION, SOLUTION INTRAVENOUS; SUBCUTANEOUS at 10:05

## 2022-10-25 RX ADMIN — INSULIN LISPRO 5 UNITS: 100 INJECTION, SOLUTION INTRAVENOUS; SUBCUTANEOUS at 18:31

## 2022-10-25 RX ADMIN — Medication 12.5 MG: at 17:04

## 2022-10-25 NOTE — PLAN OF CARE
Problem: PHYSICAL THERAPY ADULT  Goal: Performs mobility at highest level of function for planned discharge setting  See evaluation for individualized goals  Description: Treatment/Interventions: Spoke to nursing, Gait training, Bed mobility, Patient/family training, Therapeutic exercise, LE strengthening/ROM, Functional transfer training  Equipment Recommended: [place]       See flowsheet documentation for full assessment, interventions and recommendations  Outcome: Progressing  Note: Prognosis: Good  Problem List: Decreased strength, Decreased endurance, Impaired balance, Decreased mobility, Decreased coordination, Decreased safety awareness  Assessment: pt focus on ambulation with  feet S lvl noted post walking SOB and perform  steps 6 with BLHR   Pt had no LOB and S lvl overall with RW   Pt barriers cont with limited strengthening and endurance and noted SOB pass walking and performing stairs  Pt gait speed slow and shuffling at times   Pt return to recliner with chair alarm and all needs in reach   Cont POC  Barriers to Discharge: Inaccessible home environment, Decreased caregiver support     PT Discharge Recommendation: Home with home health rehabilitation    See flowsheet documentation for full assessment

## 2022-10-25 NOTE — WOUND OSTOMY CARE
Progress Note - Wound   Shelton Potter 74 y o  male MRN: [id number]  Unit/Bed#: [facility] 629-01 Encounter: [id number]        Assessment:   Patient seen today for wound care follow up visit  Patient is sitting in chair, agreeable to assessment  Patient is continent of bowel and bladder  Patient is independent with meals  1  POA evolving DTI B/L lower legs- wounds on B/L continue to evolve now both legs posterior portion have full thickness slough tissue, with linear purple, intact, nonblanchable erythema  Small amount of drainage  DTI's have the potential to evolve into full thickness unstageable, stage III, or stage IV wounds  No induration, fluctuance, odor, warmth/temperature differences, redness, or purulence noted to the above noted wounds and skin areas assessed  New dressings applied per orders listed below  Patient tolerated well- no s/s of non-verbal pain or discomfort observed during the encounter  Bedside nurse aware of plan of care  See flow sheets for more detailed assessment findings  Wound care will continue to follow  Wound Care Plan:   1-Hydraguard lotion to bilateral heels twice daily and as needed  2-Elevate heels off of bed/chair surface to offload pressure  3-Offloading air cushion in chair when out of bed, if able  4-Moisturize skin daily with skin nourishing cream   5-Turn/reposition every 2 hours while in bed using positioning wedges; and weight shift frequently while in chair for pressure re-distribution on skin  6-Apply Allevyn Life foam dressing to midline sacrum (small sacral) for prevention  [place] with P   Peel back at least daily for skin assessment and re-apply  Change dressing every 3 days and PRN  7-Left dorsal foot--apply 3m no sting skin prep to eschar daily  8-Bilateral ankles--cleanse with normal saline, pat dry  Apply Dermagran to wound and cover with ABD  Wrap with delmer  Change dressings every other day          Wound 10/20/22 Pressure Injury Ankle Anterior;Right (Active)   Wound Image    10/25/22 1101   Wound Description Epithelialization;Slough; Non-blanchable erythema 10/25/22 1101   Pressure Injury Stage DTPI 10/25/22 1101   Catie-wound Assessment Clean;Dry; Intact 10/25/22 1101   Wound Length (cm) 1 3 cm 10/25/22 1101   Wound Width (cm) 4 cm 10/25/22 1101   Wound Depth (cm) 0 2 cm 10/25/22 1101   Wound Surface Area (cm^2) 5 2 cm^2 10/25/22 1101   Wound Volume (cm^3) 1 04 cm^3 10/25/22 1101   Calculated Wound Volume (cm^3) 1 04 cm^3 10/25/22 1101   Change in Wound Size % 72 34 10/25/22 1101   Tunneling 0 cm 10/25/22 1101   Tunneling in depth located at 0 10/25/22 1101   Undermining 0 10/25/22 1101   Undermining is depth extending from 0 10/25/22 1101   Wound Site Closure HAY 10/25/22 1101   Drainage Amount Small 10/25/22 1101   Drainage Description Serosanguineous 10/25/22 1101   Non-staged Wound Description Full thickness 10/25/22 1101   Treatments Cleansed 10/25/22 1101   Dressing Dermagran gauze;Dry dressing;ABD 10/25/22 1101   Wound packed? No 10/25/22 1101   Packing- # removed 0 10/25/22 1101   Packing- # inserted 0 10/25/22 1101   Dressing Changed New 10/25/22 1101   Patient Tolerance Tolerated well 10/25/22 1101   Dressing Status Dry;Clean; Intact 10/25/22 1101       Wound 10/20/22 Pressure Injury Ankle Anterior; Left (Active)   Wound Image    10/25/22 1102   Wound Description Epithelialization; Non-blanchable erythema;Slough 10/25/22 1102   Pressure Injury Stage DTPI 10/25/22 1102   Catie-wound Assessment Clean;Dry; Intact 10/25/22 1102   Wound Length (cm) 3 cm 10/25/22 1102   Wound Width (cm) 22 cm 10/25/22 1102   Wound Depth (cm) 0 2 cm 10/25/22 1102   Wound Surface Area (cm^2) 66 cm^2 10/25/22 1102   Wound Volume (cm^3) 13 2 cm^3 10/25/22 1102   Calculated Wound Volume (cm^3) 13 2 cm^3 10/25/22 1102   Change in Wound Size % -185 1 10/25/22 1102   Tunneling 0 cm 10/25/22 1102   Tunneling in depth located at 0 10/25/22 1102   Undermining 0 10/25/22 1102 Undermining is depth extending from 0 10/25/22 1102   Wound Site Closure HAY 10/25/22 1102   Drainage Amount Small 10/25/22 1102   Drainage Description Serosanguineous 10/25/22 1102   Non-staged Wound Description Full thickness 10/25/22 1102   Treatments Cleansed 10/25/22 1102   Dressing ABD;Dry dressing;Dermagran gauze 10/25/22 1102   Wound packed? No 10/25/22 1102   Packing- # removed 0 10/25/22 1102   Packing- # inserted 0 10/25/22 1102   Dressing Changed New 10/25/22 1102   Patient Tolerance Tolerated well 10/25/22 1102   Dressing Status Clean;Dry; Intact 10/25/22 1102       Leighann DENISEN, RN, [employer]

## 2022-10-25 NOTE — ASSESSMENT & PLAN NOTE
Lab Results   Component Value Date    HGBA1C 7 5 (H) 07/22/2022     Recent Labs     10/24/22  0742 10/24/22  1237 10/24/22  1653 10/24/22  2102 10/25/22  0801 10/25/22  1141   POCGLU 282* 285* 332* 360* 253* 336*     Management per endocrinology  Glargine 6U HS  Lispro 4U TID  Sliding scale    · Home insulin regimen Home regimen insulin NPH 18 units with breakfast, 2 units at bedtime, regular insulin 10 units with breakfast and dinner    · Continue basal / bolus regimen as written above

## 2022-10-25 NOTE — PLAN OF CARE
Problem: Potential for Falls  Goal: Patient will remain free of falls  Description: INTERVENTIONS:  - Educate patient/family on patient safety including physical limitations  - Instruct patient to call for assistance with activity   - Consult OT/PT to assist with strengthening/mobility   - Keep Call bell within reach  - Keep bed low and locked with side rails adjusted as appropriate  - Keep care items and personal belongings within reach  - Initiate and maintain comfort rounds  - Make Fall Risk Sign visible to staff  - Offer Toileting every 2 Hours, in advance of need  - Initiate/Maintain bed/chair alarm  - Obtain necessary fall risk management equipment  - Apply yellow socks and bracelet for high fall risk patients  - Consider moving patient to room near nurses station  Outcome: Progressing     Problem: PAIN - ADULT  Goal: Verbalizes/displays adequate comfort level or baseline comfort level  Description: Interventions:  - Encourage patient to monitor pain and request assistance  - Assess pain using appropriate pain scale  - Administer analgesics based on type and severity of pain and evaluate response  - Implement non-pharmacological measures as appropriate and evaluate response  - Consider cultural and social influences on pain and pain management  - Notify physician/advanced practitioner if interventions unsuccessful or patient reports new pain  Outcome: Progressing     Problem: INFECTION - ADULT  Goal: Absence or prevention of progression during hospitalization  Description: INTERVENTIONS:  - Assess and monitor for signs and symptoms of infection  - Monitor lab/diagnostic results  - Monitor all insertion sites, i e  indwelling lines, tubes, and drains  - Monitor endotracheal if appropriate and nasal secretions for changes in amount and color  - [place] appropriate cooling/warming therapies per order  - Administer medications as ordered  - Instruct and encourage patient and family to use good hand hygiene technique  - Identify and instruct in appropriate isolation precautions for identified infection/condition  Outcome: Progressing  Goal: Absence of fever/infection during neutropenic period  Description: INTERVENTIONS:  - Monitor WBC    Outcome: Progressing     Problem: SAFETY ADULT  Goal: Patient will remain free of falls  Description: INTERVENTIONS:  - Educate patient/family on patient safety including physical limitations  - Instruct patient to call for assistance with activity   - Consult OT/PT to assist with strengthening/mobility   - Keep Call bell within reach  - Keep bed low and locked with side rails adjusted as appropriate  - Keep care items and personal belongings within reach  - Initiate and maintain comfort rounds  - Make Fall Risk Sign visible to staff  - Offer Toileting every 2 Hours, in advance of need  - Initiate/Maintain bed/chair alarm  - Obtain necessary fall risk management equipment  - Apply yellow socks and bracelet for high fall risk patients  - Consider moving patient to room near nurses station  Outcome: Progressing  Goal: Maintain or return to baseline ADL function  Description: INTERVENTIONS:  - Educate patient/family on patient safety including physical limitations  - Instruct patient to call for assistance with activity   - Consult OT/PT to assist with strengthening/mobility   - Keep Call bell within reach  - Keep bed low and locked with side rails adjusted as appropriate  - Keep care items and personal belongings within reach  - Initiate and maintain comfort rounds  - Make Fall Risk Sign visible to staff  - Offer Toileting every 2 Hours, in advance of need  - Initiate/Maintain bed/chair alarm  - Obtain necessary fall risk management equipment  - Apply yellow socks and bracelet for high fall risk patients  - Consider moving patient to room near nurses station  Outcome: Progressing  Goal: Maintains/Returns to pre admission functional level  Description: INTERVENTIONS:  - Perform BMAT or MOVE assessment daily    - Set and communicate daily mobility goal to care team and patient/family/caregiver  - Collaborate with rehabilitation services on mobility goals if consulted  - Perform Range of Motion 3 times a day  - Reposition patient every 2 hours  - Dangle patient 3 times a day  - Stand patient 3 times a day  - Ambulate patient 3 times a day  - Out of bed to chair 3 times a day   - Out of bed for meals 3 times a day  - Out of bed for toileting  - Record patient progress and toleration of activity level   Outcome: Progressing     Problem: DISCHARGE PLANNING  Goal: Discharge to home or other facility with appropriate resources  Description: INTERVENTIONS:  - Identify barriers to discharge w/patient and caregiver  - Arrange for needed discharge resources and transportation as appropriate  - Identify discharge learning needs (meds, wound care, etc )  - Arrange for interpretive services to assist at discharge as needed  - Refer to Case Management Department for coordinating discharge planning if the patient needs post-hospital services based on physician/advanced practitioner order or complex needs related to functional status, cognitive ability, or social support system  Outcome: Progressing     Problem: Knowledge Deficit  Goal: Patient/family/caregiver demonstrates understanding of disease process, treatment plan, medications, and discharge instructions  Description: Complete learning assessment and assess knowledge base  Interventions:  - Provide teaching at level of understanding  - Provide teaching via preferred learning methods  Outcome: Progressing     Problem: Nutrition/Hydration-ADULT  Goal: Nutrient/Hydration intake appropriate for improving, restoring or maintaining nutritional needs  Description: Monitor and assess patient's nutrition/hydration status for malnutrition  Collaborate with interdisciplinary team and initiate plan and interventions as ordered    Monitor patient's weight and dietary intake as ordered or per policy  Utilize nutrition screening tool and intervene as necessary  Determine patient's food preferences and provide high-protein, high-caloric foods as appropriate  INTERVENTIONS:  - Monitor oral intake, urinary output, labs, and treatment plans  - Assess nutrition and hydration status and recommend course of action  - Evaluate amount of meals eaten  - Assist patient with eating if necessary   - Allow adequate time for meals  - Recommend/ encourage appropriate diets, oral nutritional supplements, and vitamin/mineral supplements  - Order, calculate, and assess calorie counts as needed  - Recommend, monitor, and adjust tube feedings and TPN/PPN based on assessed needs  - Assess need for intravenous fluids  - Provide specific nutrition/hydration education as appropriate  - Include patient/family/caregiver in decisions related to nutrition  Outcome: Progressing     Problem: MOBILITY - ADULT  Goal: Maintain or return to baseline ADL function  Description: INTERVENTIONS:  - Educate patient/family on patient safety including physical limitations  - Instruct patient to call for assistance with activity   - Consult OT/PT to assist with strengthening/mobility   - Keep Call bell within reach  - Keep bed low and locked with side rails adjusted as appropriate  - Keep care items and personal belongings within reach  - Initiate and maintain comfort rounds  - Make Fall Risk Sign visible to staff  - Offer Toileting every 2 Hours, in advance of need  - Initiate/Maintain bed/chair alarm  - Obtain necessary fall risk management equipment  - Apply yellow socks and bracelet for high fall risk patients  - Consider moving patient to room near nurses station  Outcome: Progressing  Goal: Maintains/Returns to pre admission functional level  Description: INTERVENTIONS:  - Perform BMAT or MOVE assessment daily    - Set and communicate daily mobility goal to care team and patient/family/caregiver     - Collaborate with rehabilitation services on mobility goals if consulted  - Perform Range of Motion 3 times a day  - Reposition patient every 2 hours    - Dangle patient 3 times a day  - Stand patient 3 times a day  - Ambulate patient 3 times a day  - Out of bed to chair 3 times a day   - Out of bed for meals 3 times a day  - Out of bed for toileting  - Record patient progress and toleration of activity level   Outcome: Progressing     Problem: Prexisting or High Potential for Compromised Skin Integrity  Goal: Skin integrity is maintained or improved  Description: INTERVENTIONS:  - Identify patients at risk for skin breakdown  - Assess and monitor skin integrity  - Assess and monitor nutrition and hydration status  - Monitor labs   - Assess for incontinence   - Turn and reposition patient  - Assist with mobility/ambulation  - Relieve pressure over bony prominences  - Avoid friction and shearing  - Provide appropriate hygiene as needed including keeping skin clean and dry  - Evaluate need for skin moisturizer/barrier cream  - Collaborate with interdisciplinary team   - Patient/family teaching  - Consider wound care consult   Outcome: Progressing

## 2022-10-25 NOTE — PHYSICAL THERAPY NOTE
10/25/22 1145   PT Last Visit   PT Visit Date 10/25/22   End of Consult   Patient Position at End of Consult Bedside chair; All needs within reach;Bed/Chair alarm activated   Pain Assessment   Pain Assessment Tool 0-10   Pain Score No Pain   Subjective   Subjective pt agreeable to perform skilled PT   Bed Mobility   Supine to Sit 5  Supervision   Sit to Supine 5  Supervision   Transfers   Sit to Stand 5  Supervision   Stand to Sit 5  Supervision   Stand pivot 5  Supervision   Additional Comments using RW for ambulation and SPT   Ambulation/Elevation   Gait Assistance 5  Supervision   Additional items Assist x 1   Assistive Device Rolling walker   Distance 150   Stair Management Assistance 4  Minimal assist   Additional items Assist x 1;Verbal cues   Stair Management Technique Two rails   Number of Stairs 6   Balance   Static Sitting Fair +   Static Standing Fair +   Ambulatory Fair -   Endurance Deficit   Endurance Deficit Yes   Endurance Deficit Description limited SOB   Activity Tolerance   Activity Tolerance Patient tolerated treatment well   Exercises   Knee AROM Long Arc Quad Sitting;20 reps   Ankle Pumps Sitting;20 reps   Balance Training Standing   Marching Standing;20 reps   Balance training  standing w/o AD   Assessment   Prognosis Good   Assessment pt focus on ambulation with  feet S lvl noted post walking SOB and perform  steps 6 with BLHR   Pt had no LOB and S lvl overall with RW   Pt barriers cont with limited strengthening and endurance and noted SOB pass walking and performing stairs  Pt gait speed slow and shuffling at times   Pt return to recliner with chair alarm and all needs in reach    Cont POC   Goals   Patient Goals to get better   STG Expiration Date 11/03/22   Plan   PT Frequency 3-5x/wk   Recommendation   PT Discharge Recommendation Home with home health rehabilitation   Equipment Recommended [address] Recommended Wheeled walker

## 2022-10-25 NOTE — PROGRESS NOTES
Progress Note - Irene Parry 74 y o  male MRN: [id number]    Unit/Bed#: [facility] 629-01 Encounter: [id number]      CC: diabetes f/u    Subjective: This is a 74y o -year-old male with Type 1 diabetes on longterm insulin, adrenal insufficiency on hydrocortisone admitted with hypoglycemia  Feels well  No complaints  No hypoglycemia  Patient is noted to have hyperglycemia    Objective:     Vitals: Blood pressure 126/73, pulse 64, temperature 98 °F (36 7 °C), resp  rate 20, height 5' 4" (1 626 m), weight 90 4 kg (199 lb 6 4 oz), SpO2 98 %  ,Body mass index is 34 23 kg/m²  Intake/Output Summary (Last 24 hours) at 10/25/2022 1550  Last data filed at 10/25/2022 1300  Gross per 24 hour   Intake 480 ml   Output --   Net 480 ml       Physical Exam:  General Appearance: awake, appears stated age and cooperative  Head: Normocephalic, without obvious abnormality, atraumatic  Extremities: moves all extremities  Skin: Skin color and temperature normal    Pulm: no labored breathing    Lab, Imaging and other studies: I have personally reviewed pertinent reports  POC Glucose (mg/dl)   Date Value   10/25/2022 336 (H)   10/25/2022 253 (H)   10/24/2022 360 (H)   10/24/2022 332 (H)   10/24/2022 285 (H)   10/24/2022 282 (H)   10/23/2022 351 (H)   10/23/2022 322 (H)   10/23/2022 307 (H)   10/23/2022 211 (H)       Assessment and plan:    Type 1 Diabetes mellitus  HbA1c: 7 5 (07/2022)  Home regimen insulin NPH 18 units with breakfast, 2 units at bedtime, regular insulin 10 units with breakfast and dinner    Inpatient regimen: Lantus 5U at bedtime and lispro 3U TID     Recommendations:  Overall blood glucose above target range  Increase Lantus to 8 units from 6 units at bedtime  Increase lispro to 5 units from 4 units with meals  Continue with correctional scale algorithm 1 with meals and at bedtime  Will continue to monitor blood glucose and make adjustments as needed    Secondary adrenal insufficiency  C/w hydrocortisone 10mg BID and fludrocortisone 0 2mg daily    Portions of the record may have been created with voice recognition software

## 2022-10-25 NOTE — ASSESSMENT & PLAN NOTE
Type 1 diabetic presented with encephalopathy in setting of hypoglycemia, required glucose from EMS  Was a rapid response 10/19 and required intubation secondary to symptomatic hypoglycemia/altered mental status  Blood sugar was 30, i-STAT less than 20  Subsequently extubated and transferred out of ICU  · Endocrinology following, insulin per endocrinology  Dose still being adjusted    · Diabetic diet  · Monitor Accu-Cheks closely  · PT/OT cleared for home

## 2022-10-25 NOTE — PROGRESS NOTES
Reny Stevens [address] 1955, 74 y o  male MRN: [id number]  Unit/Bed#: [facility] 629-01 Encounter: [id number]  Primary Care Provider: Greg Segovia MD   Date and time admitted to hospital: 10/19/2022  6:33 AM    * Recurrent hypoglycemia  Assessment & Plan  Type 1 diabetic presented with encephalopathy in setting of hypoglycemia, required glucose from EMS  Was a rapid response 10/19 and required intubation secondary to symptomatic hypoglycemia/altered mental status  Blood sugar was 30, i-STAT less than 20  Subsequently extubated and transferred out of ICU  · Endocrinology following, insulin per endocrinology  Dose still being adjusted  · Diabetic diet  · Monitor Accu-Cheks closely  · PT/OT cleared for home    Insulin dependent type 1 diabetes mellitus   Assessment & Plan  Lab Results   Component Value Date    HGBA1C 7 5 (H) 07/22/2022     Recent Labs     10/24/22  0742 10/24/22  1237 10/24/22  1653 10/24/22  2102 10/25/22  0801 10/25/22  1141   POCGLU 282* 285* 332* 360* 253* 336*     Management per endocrinology  Glargine 6U HS  Lispro 4U TID  Sliding scale    · Home insulin regimen Home regimen insulin NPH 18 units with breakfast, 2 units at bedtime, regular insulin 10 units with breakfast and dinner  · Continue basal / bolus regimen as written above    Psychiatric disorder  Assessment & Plan  · Continue Risperdal/Zoloft home regimen    Anemia of chronic disease  Assessment & Plan  Stable, no indication for transfusion at this time      History of stroke  Assessment & Plan  · Continue ASA/Eliquis/statin    Paroxysmal atrial fibrillation   Assessment & Plan  Rate control: Continue Lopressor  AC: Eliquis    Essential hypertension  Assessment & Plan  Controlled  · Currently on Cortef and florinef for AI  · Metoprolol BID    Adrenal insufficiency   Assessment & Plan  · Continue Florinef/Cortef regimen   · Also chronically on midodrine    Dose was reduced from 10 mg t i d  To 5 mg tid PRN  · Endocrinology following      VTE Pharmacologic Prophylaxis:   Pharmacologic: Apixaban (Eliquis)  Mechanical VTE Prophylaxis in Place: Yes    Discussions with Specialists or Other Care Team Provider: nursing    Education and Discussions with Family / Patient: patient, updated daughter jared    Time Spent for Care: 30 minutes  More than 50% of total time spent on counseling and coordination of care as described above  Current Length of Stay: 6 day(s)    Current Patient Status: Inpatient   Certification Statement: The patient will continue to require additional inpatient hospital stay due to glucose control, endo clearance    Discharge Plan: 24-48 hours    Code Status: Level 1 - Full Code      Subjective:   Patient seen and examined at bedside  Comfortable  No new issues overnight  Objective:     Vitals:   Temp (24hrs), Av °F (36 7 °C), Min:97 9 °F (36 6 °C), Max:98 °F (36 7 °C)    Temp:  [97 9 °F (36 6 °C)-98 °F (36 7 °C)] 97 9 °F (36 6 °C)  HR:  [67-83] 83  Resp:  [16-18] 18  BP: (142-152)/(75-88) 152/88  SpO2:  [94 %-98 %] 95 %  Body mass index is 34 23 kg/m²  Input and Output Summary (last 24 hours):     No intake or output data in the 24 hours ending 10/25/22 1231    Physical Exam:     Physical Exam  Vitals reviewed  Constitutional:       General: He is not in acute distress  HENT:      Head: Normocephalic  Nose: Nose normal       Mouth/Throat:      Mouth: Mucous membranes are moist    Eyes:      General: No scleral icterus  Cardiovascular:      Rate and Rhythm: Normal rate  Pulmonary:      Effort: Pulmonary effort is normal  No respiratory distress  Abdominal:      General: There is no distension  Palpations: Abdomen is soft  Tenderness: There is no abdominal tenderness  Skin:     General: Skin is warm  Neurological:      Mental Status: He is alert and oriented to person, place, and time  Mental status is at baseline     Psychiatric: Mood and Affect: Mood normal          Behavior: Behavior normal        Additional Data:     Labs:    Results from last 7 days   Lab Units 10/21/22  0517   WBC Thousand/uL 8 11   HEMOGLOBIN g/dL 8 5*   HEMATOCRIT % 26 6*   PLATELETS Thousands/uL 173   NEUTROS PCT % 61   LYMPHS PCT % 22   MONOS PCT % 14*   EOS PCT % 3     Results from last 7 days   Lab Units 10/21/22  0517 10/20/22  0237 10/20/22  0024   SODIUM mmol/L 140   < > 141   POTASSIUM mmol/L 3 8   < > 3 7   CHLORIDE mmol/L 108   < > 111*   CO2 mmol/L 26   < > 29   BUN mg/dL 10   < > 9   CREATININE mg/dL 1 00   < > 1 10   ANION GAP mmol/L 6   < > 1*   CALCIUM mg/dL 8 4   < > 8 1*   ALBUMIN g/dL  --   --  2 7*   TOTAL BILIRUBIN mg/dL  --   --  0 45   ALK PHOS U/L  --   --  64   ALT U/L  --   --  15   AST U/L  --   --  7   GLUCOSE RANDOM mg/dL 200*   < > 27*    < > = values in this interval not displayed  Results from last 7 days   Lab Units 10/25/22  1141 10/25/22  0801 10/24/22  2102 10/24/22  1653 10/24/22  1237 10/24/22  0742 10/23/22  2044 10/23/22  1640 10/23/22  1156 10/23/22  0747 10/23/22  0206 10/22/22  2215   POC GLUCOSE mg/dl 336* 253* 360* 332* 285* 282* 351* 322* 307* 211* 223* 275*         Results from last 7 days   Lab Units 10/19/22  2208   LACTIC ACID mmol/L 1 3           * I Have Reviewed All Lab Data Listed Above  * Additional Pertinent Lab Tests Reviewed:  [facility] 66 Admission Reviewed      Lines:  Invasive Devices  Report    Peripheral Intravenous Line  Duration           Peripheral IV 10/20/22 Distal;Left;Upper;Ventral (anterior) Arm 5 days               Imaging:    Imaging Reports Reviewed Today Include: no new imaging    Recent Cultures (last 7 days):           Last 24 Hours Medication List:   Current Facility-Administered Medications   Medication Dose Route Frequency Provider Last Rate   • acetaminophen  650 mg Oral Q6H PRN Hetul Red, DO     • apixaban  5 mg Oral BID Hetul Red, DO     • aspirin  81 mg Oral Daily Hetul Red, DO     • atorvastatin  10 mg Oral Daily With [employer], DO     • chlorhexidine  15 mL Mouth/Throat Q12H [facility] Hetul Red, DO     • fludrocortisone  0 2 mg Oral Daily Hetul Red, DO     • hydrocortisone  10 mg Oral BID Hetul Red, DO     • insulin glargine  6 Units Subcutaneous HS Arabella Chen MD     • insulin lispro  1-5 Units Subcutaneous TID AC Ryann De La Vega MD     • insulin lispro  1-5 Units Subcutaneous TID AC NARA Fried     • insulin lispro  1-5 Units Subcutaneous HS NARA Fried     • insulin lispro  4 Units Subcutaneous TID With Meals Khadijah Dykes MD     • metoprolol tartrate  12 5 mg Oral BID Hetul Red, DO     • midodrine  5 mg Oral TID PRN Belgica Garcia PA-C     • risperiDONE  0 5 mg Oral BID Hetrodríguez Red, DO     • sertraline  100 mg Oral Daily Opal Red, DO          Today, Patient Was Seen By: Avni De Leon    ** Please Note: Dictation voice to text software may have been used in the creation of this document   **

## 2022-10-25 NOTE — CASE MANAGEMENT
Case Management Discharge Planning Note    Patient name Nayely Betancur  Location [facility] 629/[facility] 331-45 MRN [id number]  : 1955 Date 10/25/2022       Current Admission Date: 10/19/2022  Current Admission Diagnosis:Recurrent hypoglycemia   Patient Active Problem List    Diagnosis Date Noted   • Recurrent hypoglycemia 10/19/2022   • Insulin dependent type 1 diabetes mellitus  10/19/2022   • Adrenal insufficiency  10/19/2022   • Essential hypertension 10/19/2022   • Paroxysmal atrial fibrillation  10/19/2022   • History of stroke 10/19/2022   • Anemia of chronic disease 10/19/2022   • Psychiatric disorder 10/19/2022      LOS (days): 6  Geometric Mean LOS (GMLOS) (days): 3 90  Days to GMLOS:-2 2     OBJECTIVE:  Risk of Unplanned Readmission Score: 14 65         Current admission status: Inpatient   Preferred Pharmacy:   [address], [address] [facility] [address]  Phone: [phone] Fax: Ry Bejarano [place] [address] [facility] 38 [address] [facility]  Phone: [phone] Fax: [phone]    Primary Care Provider: Lexy Cesar MD    Primary Insurance: [address]  Secondary Insurance:     DISCHARGE DETAILS: Patient states that he has a walker at home and does not need any additional DME  States S/O, Angela Melendez, is with him at all times, and can assist as needed   [facility] home health made aware of DC

## 2022-10-25 NOTE — RESTORATIVE TECHNICIAN NOTE
Restorative Technician Note      Patient Name: Tyree Arceo     Restorative Tech Visit Date: 10/25/2022  Note Type: Mobility  Patient Position Upon Consult: Seated edge of bed  Activity Performed: Ambulated  Assistive Device: Roller walker  Patient Position at End of Consult: Bedside chair;  All needs within reach    Pascual Santana, Restorative Technician

## 2022-10-26 ENCOUNTER — HOME HEALTH ADMISSION (OUTPATIENT)
Dept: HOME HEALTH SERVICES | Facility: HOME HEALTHCARE | Age: 67
End: 2022-10-26

## 2022-10-26 VITALS
TEMPERATURE: 97.9 F | RESPIRATION RATE: 20 BRPM | HEART RATE: 63 BPM | BODY MASS INDEX: 33.77 KG/M2 | DIASTOLIC BLOOD PRESSURE: 70 MMHG | OXYGEN SATURATION: 98 % | SYSTOLIC BLOOD PRESSURE: 138 MMHG | WEIGHT: 197.8 LBS | HEIGHT: 64 IN

## 2022-10-26 LAB
ANION GAP SERPL CALCULATED.3IONS-SCNC: 6 MMOL/L (ref 4–13)
BUN SERPL-MCNC: 10 MG/DL (ref 5–25)
CALCIUM SERPL-MCNC: 8.5 MG/DL (ref 8.3–10.1)
CHLORIDE SERPL-SCNC: 107 MMOL/L (ref 96–108)
CO2 SERPL-SCNC: 26 MMOL/L (ref 21–32)
CREAT SERPL-MCNC: 1.23 MG/DL (ref 0.6–1.3)
GFR SERPL CREATININE-BSD FRML MDRD: 61 ML/MIN/1.73SQ M
GLUCOSE SERPL-MCNC: 195 MG/DL (ref 65–140)
GLUCOSE SERPL-MCNC: 258 MG/DL (ref 65–140)
GLUCOSE SERPL-MCNC: 268 MG/DL (ref 65–140)
GLUCOSE SERPL-MCNC: 291 MG/DL (ref 65–140)
POTASSIUM SERPL-SCNC: 4.3 MMOL/L (ref 3.5–5.3)
SODIUM SERPL-SCNC: 139 MMOL/L (ref 135–147)

## 2022-10-26 PROCEDURE — 80048 BASIC METABOLIC PNL TOTAL CA: CPT | Performed by: STUDENT IN AN ORGANIZED HEALTH CARE EDUCATION/TRAINING PROGRAM

## 2022-10-26 PROCEDURE — 97530 THERAPEUTIC ACTIVITIES: CPT

## 2022-10-26 PROCEDURE — 99239 HOSP IP/OBS DSCHRG MGMT >30: CPT | Performed by: STUDENT IN AN ORGANIZED HEALTH CARE EDUCATION/TRAINING PROGRAM

## 2022-10-26 PROCEDURE — 99231 SBSQ HOSP IP/OBS SF/LOW 25: CPT | Performed by: INTERNAL MEDICINE

## 2022-10-26 PROCEDURE — 97116 GAIT TRAINING THERAPY: CPT

## 2022-10-26 PROCEDURE — 82948 REAGENT STRIP/BLOOD GLUCOSE: CPT

## 2022-10-26 PROCEDURE — 97535 SELF CARE MNGMENT TRAINING: CPT

## 2022-10-26 RX ORDER — INSULIN LISPRO 100 [IU]/ML
6 INJECTION, SOLUTION INTRAVENOUS; SUBCUTANEOUS
Qty: 15 ML | Refills: 0 | Status: SHIPPED | OUTPATIENT
Start: 2022-10-26

## 2022-10-26 RX ORDER — INSULIN LISPRO 100 [IU]/ML
6 INJECTION, SOLUTION INTRAVENOUS; SUBCUTANEOUS
Status: DISCONTINUED | OUTPATIENT
Start: 2022-10-26 | End: 2022-10-26 | Stop reason: HOSPADM

## 2022-10-26 RX ORDER — PEN NEEDLE, DIABETIC 32GX 5/32"
NEEDLE, DISPOSABLE MISCELLANEOUS
Qty: 100 EACH | Refills: 0 | Status: SHIPPED | OUTPATIENT
Start: 2022-10-26

## 2022-10-26 RX ORDER — MIDODRINE HYDROCHLORIDE 5 MG/1
5 TABLET ORAL 3 TIMES DAILY PRN
Qty: 30 TABLET | Refills: 0 | Status: SHIPPED | OUTPATIENT
Start: 2022-10-26 | End: 2022-11-25

## 2022-10-26 RX ORDER — INSULIN GLARGINE 100 [IU]/ML
10 INJECTION, SOLUTION SUBCUTANEOUS
Status: DISCONTINUED | OUTPATIENT
Start: 2022-10-26 | End: 2022-10-26 | Stop reason: HOSPADM

## 2022-10-26 RX ORDER — INSULIN GLARGINE 100 [IU]/ML
10 INJECTION, SOLUTION SUBCUTANEOUS
Qty: 3 ML | Refills: 0 | Status: SHIPPED | OUTPATIENT
Start: 2022-10-26 | End: 2022-11-25

## 2022-10-26 RX ORDER — INSULIN GLARGINE 100 [IU]/ML
10 INJECTION, SOLUTION SUBCUTANEOUS
Qty: 3 ML | Refills: 0 | Status: SHIPPED | OUTPATIENT
Start: 2022-10-26 | End: 2022-10-26

## 2022-10-26 RX ORDER — INSULIN GLARGINE 100 [IU]/ML
10 INJECTION, SOLUTION SUBCUTANEOUS
Qty: 10 ML | Refills: 0 | Status: SHIPPED | OUTPATIENT
Start: 2022-10-26 | End: 2022-10-26

## 2022-10-26 RX ADMIN — RISPERIDONE 0.5 MG: 0.25 TABLET ORAL at 17:34

## 2022-10-26 RX ADMIN — APIXABAN 5 MG: 5 TABLET, FILM COATED ORAL at 08:17

## 2022-10-26 RX ADMIN — INSULIN LISPRO 6 UNITS: 100 INJECTION, SOLUTION INTRAVENOUS; SUBCUTANEOUS at 17:21

## 2022-10-26 RX ADMIN — APIXABAN 5 MG: 5 TABLET, FILM COATED ORAL at 17:34

## 2022-10-26 RX ADMIN — HYDROCORTISONE 10 MG: 10 TABLET ORAL at 17:48

## 2022-10-26 RX ADMIN — FLUDROCORTISONE ACETATE 0.2 MG: 0.1 TABLET ORAL at 08:17

## 2022-10-26 RX ADMIN — INSULIN LISPRO 1 UNITS: 100 INJECTION, SOLUTION INTRAVENOUS; SUBCUTANEOUS at 17:20

## 2022-10-26 RX ADMIN — SERTRALINE 100 MG: 100 TABLET, FILM COATED ORAL at 08:17

## 2022-10-26 RX ADMIN — INSULIN LISPRO 2 UNITS: 100 INJECTION, SOLUTION INTRAVENOUS; SUBCUTANEOUS at 08:15

## 2022-10-26 RX ADMIN — INSULIN LISPRO 5 UNITS: 100 INJECTION, SOLUTION INTRAVENOUS; SUBCUTANEOUS at 08:16

## 2022-10-26 RX ADMIN — ATORVASTATIN CALCIUM 10 MG: 10 TABLET, FILM COATED ORAL at 17:18

## 2022-10-26 RX ADMIN — Medication 12.5 MG: at 08:17

## 2022-10-26 RX ADMIN — INSULIN LISPRO 5 UNITS: 100 INJECTION, SOLUTION INTRAVENOUS; SUBCUTANEOUS at 12:29

## 2022-10-26 RX ADMIN — RISPERIDONE 0.5 MG: 0.25 TABLET ORAL at 08:17

## 2022-10-26 RX ADMIN — ASPIRIN 81 MG CHEWABLE TABLET 81 MG: 81 TABLET CHEWABLE at 08:17

## 2022-10-26 RX ADMIN — HYDROCORTISONE 10 MG: 10 TABLET ORAL at 08:16

## 2022-10-26 RX ADMIN — Medication 12.5 MG: at 17:34

## 2022-10-26 RX ADMIN — INSULIN LISPRO 2 UNITS: 100 INJECTION, SOLUTION INTRAVENOUS; SUBCUTANEOUS at 12:29

## 2022-10-26 RX ADMIN — CHLORHEXIDINE GLUCONATE 15 ML: 1.2 SOLUTION ORAL at 08:17

## 2022-10-26 NOTE — PLAN OF CARE
Problem: Potential for Falls  Goal: Patient will remain free of falls  Description: INTERVENTIONS:  - Educate patient/family on patient safety including physical limitations  - Instruct patient to call for assistance with activity   - Consult OT/PT to assist with strengthening/mobility   - Keep Call bell within reach  - Keep bed low and locked with side rails adjusted as appropriate  - Keep care items and personal belongings within reach  - Initiate and maintain comfort rounds  - Make Fall Risk Sign visible to staff  - Offer Toileting every 2 Hours, in advance of need  - Initiate/Maintain bed/chair alarm  - Obtain necessary fall risk management equipment  - Apply yellow socks and bracelet for high fall risk patients  - Consider moving patient to room near nurses station  10/25/2022 2342 by Kecia Bartlett RN  Outcome: Progressing  10/25/2022 2342 by Kecia Bartlett RN  Outcome: Progressing     Problem: PAIN - ADULT  Goal: Verbalizes/displays adequate comfort level or baseline comfort level  Description: Interventions:  - Encourage patient to monitor pain and request assistance  - Assess pain using appropriate pain scale  - Administer analgesics based on type and severity of pain and evaluate response  - Implement non-pharmacological measures as appropriate and evaluate response  - Consider cultural and social influences on pain and pain management  - Notify physician/advanced practitioner if interventions unsuccessful or patient reports new pain  10/25/2022 2342 by Kecia Bartlett RN  Outcome: Progressing  10/25/2022 2342 by Kecia Bartlett RN  Outcome: Progressing     Problem: INFECTION - ADULT  Goal: Absence or prevention of progression during hospitalization  Description: INTERVENTIONS:  - Assess and monitor for signs and symptoms of infection  - Monitor lab/diagnostic results  - Monitor all insertion sites, i e  indwelling lines, tubes, and drains  - Monitor endotracheal if appropriate and nasal secretions for changes in amount and color  - Adams appropriate cooling/warming therapies per order  - Administer medications as ordered  - Instruct and encourage patient and family to use good hand hygiene technique  - Identify and instruct in appropriate isolation precautions for identified infection/condition  10/25/2022 2342 by Eliceo Roque RN  Outcome: Progressing  10/25/2022 2342 by Eliceo Roque RN  Outcome: Progressing  Goal: Absence of fever/infection during neutropenic period  Description: INTERVENTIONS:  - Monitor WBC    10/25/2022 2342 by Eliceo Roque RN  Outcome: Progressing  10/25/2022 2342 by Eliceo Roque RN  Outcome: Progressing     Problem: SAFETY ADULT  Goal: Patient will remain free of falls  Description: INTERVENTIONS:  - Educate patient/family on patient safety including physical limitations  - Instruct patient to call for assistance with activity   - Consult OT/PT to assist with strengthening/mobility   - Keep Call bell within reach  - Keep bed low and locked with side rails adjusted as appropriate  - Keep care items and personal belongings within reach  - Initiate and maintain comfort rounds  - Make Fall Risk Sign visible to staff  - Offer Toileting every 2 Hours, in advance of need  - Initiate/Maintain bed/chair alarm  - Obtain necessary fall risk management equipment  - Apply yellow socks and bracelet for high fall risk patients  - Consider moving patient to room near nurses station  10/25/2022 2342 by Eliceo Roque RN  Outcome: Progressing  10/25/2022 2342 by Eliceo Roque RN  Outcome: Progressing  Goal: Maintain or return to baseline ADL function  Description: INTERVENTIONS:  - Educate patient/family on patient safety including physical limitations  - Instruct patient to call for assistance with activity   - Consult OT/PT to assist with strengthening/mobility   - Keep Call bell within reach  - Keep bed low and locked with side rails adjusted as appropriate  - Keep care items and personal belongings within reach  - Initiate and maintain comfort rounds  - Make Fall Risk Sign visible to staff  - Offer Toileting every 2 Hours, in advance of need  - Initiate/Maintain bed/chair alarm  - Obtain necessary fall risk management equipment  - Apply yellow socks and bracelet for high fall risk patients  - Consider moving patient to room near nurses station  10/25/2022 2342 by Marli Rendon RN  Outcome: Progressing  10/25/2022 2342 by Marli Rendon RN  Outcome: Progressing  Goal: Maintains/Returns to pre admission functional level  Description: INTERVENTIONS:  - Perform BMAT or MOVE assessment daily    - Set and communicate daily mobility goal to care team and patient/family/caregiver  - Collaborate with rehabilitation services on mobility goals if consulted  - Perform Range of Motion 3 times a day  - Reposition patient every 2 hours    - Dangle patient 3 times a day  - Stand patient 3 times a day  - Ambulate patient 3 times a day  - Out of bed to chair 3 times a day   - Out of bed for meals 3 times a day  - Out of bed for toileting  - Record patient progress and toleration of activity level   10/25/2022 2342 by Marli Rendon RN  Outcome: Progressing  10/25/2022 2342 by Marli Rendon RN  Outcome: Progressing     Problem: DISCHARGE PLANNING  Goal: Discharge to home or other facility with appropriate resources  Description: INTERVENTIONS:  - Identify barriers to discharge w/patient and caregiver  - Arrange for needed discharge resources and transportation as appropriate  - Identify discharge learning needs (meds, wound care, etc )  - Arrange for interpretive services to assist at discharge as needed  - Refer to Case Management Department for coordinating discharge planning if the patient needs post-hospital services based on physician/advanced practitioner order or complex needs related to functional status, cognitive ability, or social support system  10/25/2022 2342 by Marli Rendon RN  Outcome: Progressing  10/25/2022 2342 by Elsa Youssef RN  Outcome: Progressing     Problem: Knowledge Deficit  Goal: Patient/family/caregiver demonstrates understanding of disease process, treatment plan, medications, and discharge instructions  Description: Complete learning assessment and assess knowledge base  Interventions:  - Provide teaching at level of understanding  - Provide teaching via preferred learning methods  10/25/2022 2342 by Elsa Youssef RN  Outcome: Progressing  10/25/2022 2342 by Elsa Youssef RN  Outcome: Progressing     Problem: Nutrition/Hydration-ADULT  Goal: Nutrient/Hydration intake appropriate for improving, restoring or maintaining nutritional needs  Description: Monitor and assess patient's nutrition/hydration status for malnutrition  Collaborate with interdisciplinary team and initiate plan and interventions as ordered  Monitor patient's weight and dietary intake as ordered or per policy  Utilize nutrition screening tool and intervene as necessary  Determine patient's food preferences and provide high-protein, high-caloric foods as appropriate       INTERVENTIONS:  - Monitor oral intake, urinary output, labs, and treatment plans  - Assess nutrition and hydration status and recommend course of action  - Evaluate amount of meals eaten  - Assist patient with eating if necessary   - Allow adequate time for meals  - Recommend/ encourage appropriate diets, oral nutritional supplements, and vitamin/mineral supplements  - Order, calculate, and assess calorie counts as needed  - Recommend, monitor, and adjust tube feedings and TPN/PPN based on assessed needs  - Assess need for intravenous fluids  - Provide specific nutrition/hydration education as appropriate  - Include patient/family/caregiver in decisions related to nutrition  10/25/2022 2342 by Elsa Youssef RN  Outcome: Progressing  10/25/2022 2342 by Elsa Youssef RN  Outcome: Progressing     Problem: MOBILITY - ADULT  Goal: Maintain or return to baseline ADL function  Description: INTERVENTIONS:  - Educate patient/family on patient safety including physical limitations  - Instruct patient to call for assistance with activity   - Consult OT/PT to assist with strengthening/mobility   - Keep Call bell within reach  - Keep bed low and locked with side rails adjusted as appropriate  - Keep care items and personal belongings within reach  - Initiate and maintain comfort rounds  - Make Fall Risk Sign visible to staff  - Offer Toileting every 2 Hours, in advance of need  - Initiate/Maintain bed/chair alarm  - Obtain necessary fall risk management equipment  - Apply yellow socks and bracelet for high fall risk patients  - Consider moving patient to room near nurses station  10/25/2022 2342 by Marshal Allison RN  Outcome: Progressing  10/25/2022 2342 by Marshal Allison RN  Outcome: Progressing  Goal: Maintains/Returns to pre admission functional level  Description: INTERVENTIONS:  - Perform BMAT or MOVE assessment daily    - Set and communicate daily mobility goal to care team and patient/family/caregiver  - Collaborate with rehabilitation services on mobility goals if consulted  - Perform Range of Motion 3 times a day  - Reposition patient every 2 hours    - Dangle patient 3 times a day  - Stand patient 3 times a day  - Ambulate patient 3 times a day  - Out of bed to chair 3 times a day   - Out of bed for meals 3 times a day  - Out of bed for toileting  - Record patient progress and toleration of activity level   10/25/2022 2342 by Marshal Allison RN  Outcome: Progressing  10/25/2022 2342 by Marshal Allison RN  Outcome: Progressing     Problem: Prexisting or High Potential for Compromised Skin Integrity  Goal: Skin integrity is maintained or improved  Description: INTERVENTIONS:  - Identify patients at risk for skin breakdown  - Assess and monitor skin integrity  - Assess and monitor nutrition and hydration status  - Monitor labs   - Assess for incontinence - Turn and reposition patient  - Assist with mobility/ambulation  - Relieve pressure over bony prominences  - Avoid friction and shearing  - Provide appropriate hygiene as needed including keeping skin clean and dry  - Evaluate need for skin moisturizer/barrier cream  - Collaborate with interdisciplinary team   - Patient/family teaching  - Consider wound care consult   10/25/2022 2342 by Malissa Macedo RN  Outcome: Progressing  10/25/2022 2342 by Malissa Macedo RN  Outcome: Progressing

## 2022-10-26 NOTE — PHYSICAL THERAPY NOTE
Physical Therapy Progress Note     10/26/22 0840   PT Last Visit   PT Visit Date 10/26/22   Note Type   Note Type Treatment   Pain Assessment   Pain Assessment Tool 0-10   Pain Score No Pain   Subjective   Subjective The patient states that he is hopeful to go home today  Transfers   Sit to Stand 6  Modified independent   Stand to Sit 6  Modified independent   Ambulation/Elevation   Gait pattern Excessively slow; Short stride;Decreased foot clearance   Gait Assistance 5  Supervision  (Supervision with no device, Modified independent with the rolling walker )   Additional items Verbal cues   Assistive Device None;Bariatric Rolling walker   Distance 440 feet  Stair Management Assistance 5  Supervision   Additional items Increased time required   Stair Management Technique Two rails; Step to pattern; Foreward   Number of Stairs 4   Balance   Static Sitting Normal   Dynamic Sitting Good   Static Standing Fair +   Ambulatory Fair   Activity Tolerance   Activity Tolerance Patient tolerated treatment well   Nurse [address] [place] [address] Dr , RN  Assessment   Prognosis Good   Problem List Decreased mobility   Assessment The patient continues to demonstrate progress as he is ambulating far beyond community distances without the walker  He was able to  objects as well as dynamically look around without any reduction in anca during gait  He does continuet o have a reduced anca without the walker, but he does so safely  His gait is more normal and fluid with the walker, and he would benefit from a walker for community ambulation to enhance his comfort and safety  He readily completed the stairs as well  Continued therapy will assist in his return to normal gait without a device  Barriers to Discharge None   Goals   Patient Goals To go home  STG Expiration Date 11/03/22   PT Treatment Day 1   Plan   Treatment/Interventions Functional transfer training;LE strengthening/ROM; Elevations; Therapeutic exercise; Endurance training;Patient/family training;Bed mobility;Gait training   Progress Progressing toward goals   PT Frequency 3-5x/wk   Recommendation   PT Discharge Recommendation Home with home health rehabilitation   Equipment Recommended [place] walker   AM-[facility] Basic Mobility Inpatient   Turning in Bed Without Bedrails 4   Lying on Back to Sitting on Edge of Flat Bed 4   Moving Bed to Chair 4   Standing Up From Chair 4   Walk in Room 3   Climb 3-5 Stairs 3   Basic Mobility Inpatient Raw Score 22   Basic Mobility Standardized Score 47 4   Highest Level Of Mobility   JH-HLM Goal 7: Walk 25 feet or more   JH-HLM Achieved 8: Walk 250 feet ot more       An AM-PAC Basic Mobility standardized score less than 40 78 suggests the patient may benefit from discharge to post-acute rehab services      Yazmin Espana

## 2022-10-26 NOTE — CASE MANAGEMENT
Case Management Discharge Planning Note    Patient name Sonja Fields  Location [facility] 629/[facility] 625-87 MRN [id number]  : 1955 Date 10/26/2022       Current Admission Date: 10/19/2022  Current Admission Diagnosis:Recurrent hypoglycemia   Patient Active Problem List    Diagnosis Date Noted   • Recurrent hypoglycemia 10/19/2022   • Insulin dependent type 1 diabetes mellitus  10/19/2022   • Adrenal insufficiency  10/19/2022   • Essential hypertension 10/19/2022   • Paroxysmal atrial fibrillation  10/19/2022   • History of stroke 10/19/2022   • Anemia of chronic disease 10/19/2022   • Psychiatric disorder 10/19/2022      LOS (days): 7  Geometric Mean LOS (GMLOS) (days): 3 90  Days to GMLOS:-3 2     OBJECTIVE:  Risk of Unplanned Readmission Score: 15 68         Current admission status: Inpatient   Preferred Pharmacy:   [address], [address]  [address] [facility] [address]  Phone: [phone] Fax: Ry Bejarano [address]  Phone: [phone] Fax: [phone]    Primary Care Provider: Lourdes Patton MD    Primary Insurance: [address] [place] [address]  Secondary Insurance:     DISCHARGE DETAILS:    Discharge planning discussed with[de-identified] Daughter Uriel Eller of Choice: Yes  Comments - Freedom of Choice: Discussed FOC  CM contacted family/caregiver?: Yes    Contacts  Patient Contacts: Brayan Carpenter  Relationship to Patient[de-identified] Family  Contact Method: Phone  Reason/Outcome: Continuity of Care, Emergency Contact, Discharge Planning    Other Referral/Resources/Interventions Provided:  Referral Comments: TC to daughter, requesting referral to  [facility], as they are attempting to transition care to   Everlena End states that she assists patient with administration of insulins, and medication management      IMM Given (Date):: 10/26/22  IMM Given to[de-identified] Family      IMM reviewed with patient's caregiver, patient's caregiver agrees with discharge determination

## 2022-10-26 NOTE — OCCUPATIONAL THERAPY NOTE
Occupational Therapy Treatment Note:      10/26/22 1130   OT Last Visit   OT Visit Date 10/26/22   Note Type   Note Type Treatment   Pain Assessment   Pain Assessment Tool 0-10   Pain Score No Pain   ADL   LB Dressing Assistance 5  Supervision/Setup   LB Dressing Comments seated eob to kael socks   Bed Mobility   Supine to Sit 4  Minimal assistance   Sit to Supine 5  Supervision   Transfers   Sit to Stand 5  Supervision   Stand to Sit 5  Supervision   Stand pivot 5  Supervision   Functional Mobility   Functional Mobility 5  Supervision  (no a d  and then with rw (fair minus safety))   Additional items   (pt states he does not intend to use rw in most rooms of house )   Cognition   Arousal/Participation Alert   Attention Within functional limits   Memory Decreased recall of precautions   Following Commands Follows multistep commands with increased time or repetition   Comments pt was able to recall and follow multistep commands with 1 episode of self correction noted  pt was noted to have fair minus carryover of recommended use of rw   Activity Tolerance   Activity Tolerance Patient tolerated treatment well   Assessment   Assessment pt participated in am ot session and was seen focusing on functional mobility / trnasfers, multistep commands following/ safety assessment and activity tolerence  pt reports that he does not have wounds on legs but is ntoted to have large dressings on b shins  pt was s for functional mobility and transfers with and without a d   pt does not use rw safely when approaching closet and dresser etc  pt was able to self correct multistep direction following  pt requires assistance for medication management and and for safe iadl completion  question if pts dtr can provide this assist  spoke c case management  Plan   Treatment Interventions ADL retraining;Functional transfer training; Endurance training;Patient/family training; Activityengagement   Goal Expiration Date 11/05/22   OT Treatment Day 1   OT Frequency 3-5x/wk   Recommendation   OT Discharge Recommendation No rehabilitation needs  (vna for medication management, medical compliance)   AM-PAC Daily Activity Inpatient   Lower Body Dressing 3   Bathing 3   Toileting 3   Upper Body Dressing 4   Grooming 4   Eating 4   Daily Activity Raw Score 21   Daily Activity Standardized Score (Calc for Raw Score >=11) 44 27   AM-PAC Applied Cognition Inpatient   Following a Speech/Presentation 3   Understanding Ordinary Conversation 4   Taking Medications 3   Remembering Where Things Are Placed or Put Away 3   Remembering List of 4-5 Errands 3   Taking Care of Complicated Tasks 3   Applied Cognition Raw Score 19   Applied Cognition Standardized Score 39 77   April A Storm

## 2022-10-26 NOTE — CASE MANAGEMENT
Case Management Discharge Planning Note    Patient name Rebekah Limon  Location [facility] 629/[facility] 976-75 MRN [id number]  : 1955 Date 10/26/2022       Current Admission Date: 10/19/2022  Current Admission Diagnosis:Recurrent hypoglycemia   Patient Active Problem List    Diagnosis Date Noted   • Recurrent hypoglycemia 10/19/2022   • Insulin dependent type 1 diabetes mellitus  10/19/2022   • Adrenal insufficiency  10/19/2022   • Essential hypertension 10/19/2022   • Paroxysmal atrial fibrillation  10/19/2022   • History of stroke 10/19/2022   • Anemia of chronic disease 10/19/2022   • Psychiatric disorder 10/19/2022      LOS (days): 7  Geometric Mean LOS (GMLOS) (days): 3 90  Days to GMLOS:-3 2     OBJECTIVE:  Risk of Unplanned Readmission Score: 15 68         Current admission status: Inpatient   Preferred Pharmacy:   [address], [address]  [address] [place] [address]  Phone: [phone] Fax: Ry Bejarano [address]  Phone: [phone] Fax: [phone]    Primary Care Provider: Zara Whitfield MD    Primary Insurance: [address] [place] [address]  Secondary Insurance:     DISCHARGE DETAILS:    Discharge planning discussed with[de-identified] Daughter Marquez Jacober of Choice: Yes  Comments - Freedom of Choice: Discussed FOC  CM contacted family/caregiver?: Yes    Contacts  Patient Contacts: Augustine Claw  Relationship to Patient[de-identified] Family  Contact Method: Phone  Reason/Outcome: Continuity of Care, Emergency Contact, Discharge Planning    Other Referral/Resources/Interventions Provided:  Referral Comments: TC to daughter, requesting referral to  VNA, as they are attempting to transition care to   Hodanjennifer Crain states that she assists patient with administration of insulins, and medication management         IMM Given (Date):: 10/26/22  IMM Given to[de-identified] Family     [facility] able to accept for SN, PT, and OT services

## 2022-10-26 NOTE — PLAN OF CARE
Problem: Potential for Falls  Goal: Patient will remain free of falls  Description: INTERVENTIONS:  - Educate patient/family on patient safety including physical limitations  - Instruct patient to call for assistance with activity   - Consult OT/PT to assist with strengthening/mobility   - Keep Call bell within reach  - Keep bed low and locked with side rails adjusted as appropriate  - Keep care items and personal belongings within reach  - Initiate and maintain comfort rounds  - Make Fall Risk Sign visible to staff  - Offer Toileting every 2 Hours, in advance of need  - Initiate/Maintain bed/chair alarm  - Obtain necessary fall risk management equipment  - Apply yellow socks and bracelet for high fall risk patients  - Consider moving patient to room near nurses station  Outcome: Progressing     Problem: PAIN - ADULT  Goal: Verbalizes/displays adequate comfort level or baseline comfort level  Description: Interventions:  - Encourage patient to monitor pain and request assistance  - Assess pain using appropriate pain scale  - Administer analgesics based on type and severity of pain and evaluate response  - Implement non-pharmacological measures as appropriate and evaluate response  - Consider cultural and social influences on pain and pain management  - Notify physician/advanced practitioner if interventions unsuccessful or patient reports new pain  Outcome: Progressing     Problem: INFECTION - ADULT  Goal: Absence or prevention of progression during hospitalization  Description: INTERVENTIONS:  - Assess and monitor for signs and symptoms of infection  - Monitor lab/diagnostic results  - Monitor all insertion sites, i e  indwelling lines, tubes, and drains  - Monitor endotracheal if appropriate and nasal secretions for changes in amount and color  - [place] appropriate cooling/warming therapies per order  - Administer medications as ordered  - Instruct and encourage patient and family to use good hand hygiene technique  - Identify and instruct in appropriate isolation precautions for identified infection/condition  Outcome: Progressing  Goal: Absence of fever/infection during neutropenic period  Description: INTERVENTIONS:  - Monitor WBC    Outcome: Progressing     Problem: SAFETY ADULT  Goal: Patient will remain free of falls  Description: INTERVENTIONS:  - Educate patient/family on patient safety including physical limitations  - Instruct patient to call for assistance with activity   - Consult OT/PT to assist with strengthening/mobility   - Keep Call bell within reach  - Keep bed low and locked with side rails adjusted as appropriate  - Keep care items and personal belongings within reach  - Initiate and maintain comfort rounds  - Make Fall Risk Sign visible to staff  - Offer Toileting every 2 Hours, in advance of need  - Initiate/Maintain bed/chair alarm  - Obtain necessary fall risk management equipment  - Apply yellow socks and bracelet for high fall risk patients  - Consider moving patient to room near nurses station  Outcome: Progressing  Goal: Maintain or return to baseline ADL function  Description: INTERVENTIONS:  - Educate patient/family on patient safety including physical limitations  - Instruct patient to call for assistance with activity   - Consult OT/PT to assist with strengthening/mobility   - Keep Call bell within reach  - Keep bed low and locked with side rails adjusted as appropriate  - Keep care items and personal belongings within reach  - Initiate and maintain comfort rounds  - Make Fall Risk Sign visible to staff  - Offer Toileting every 2 Hours, in advance of need  - Initiate/Maintain bed/chair alarm  - Obtain necessary fall risk management equipment  - Apply yellow socks and bracelet for high fall risk patients  - Consider moving patient to room near nurses station  Outcome: Progressing  Goal: Maintains/Returns to pre admission functional level  Description: INTERVENTIONS:  - Perform BMAT or MOVE assessment daily    - Set and communicate daily mobility goal to care team and patient/family/caregiver  - Collaborate with rehabilitation services on mobility goals if consulted  - Perform Range of Motion 3 times a day  - Reposition patient every 2 hours  - Dangle patient 3 times a day  - Stand patient 3 times a day  - Ambulate patient 3 times a day  - Out of bed to chair 3 times a day   - Out of bed for meals 3 times a day  - Out of bed for toileting  - Record patient progress and toleration of activity level   Outcome: Progressing     Problem: DISCHARGE PLANNING  Goal: Discharge to home or other facility with appropriate resources  Description: INTERVENTIONS:  - Identify barriers to discharge w/patient and caregiver  - Arrange for needed discharge resources and transportation as appropriate  - Identify discharge learning needs (meds, wound care, etc )  - Arrange for interpretive services to assist at discharge as needed  - Refer to Case Management Department for coordinating discharge planning if the patient needs post-hospital services based on physician/advanced practitioner order or complex needs related to functional status, cognitive ability, or social support system  Outcome: Progressing     Problem: Knowledge Deficit  Goal: Patient/family/caregiver demonstrates understanding of disease process, treatment plan, medications, and discharge instructions  Description: Complete learning assessment and assess knowledge base  Interventions:  - Provide teaching at level of understanding  - Provide teaching via preferred learning methods  Outcome: Progressing     Problem: Nutrition/Hydration-ADULT  Goal: Nutrient/Hydration intake appropriate for improving, restoring or maintaining nutritional needs  Description: Monitor and assess patient's nutrition/hydration status for malnutrition  Collaborate with interdisciplinary team and initiate plan and interventions as ordered    Monitor patient's weight and dietary intake as ordered or per policy  Utilize nutrition screening tool and intervene as necessary  Determine patient's food preferences and provide high-protein, high-caloric foods as appropriate  INTERVENTIONS:  - Monitor oral intake, urinary output, labs, and treatment plans  - Assess nutrition and hydration status and recommend course of action  - Evaluate amount of meals eaten  - Assist patient with eating if necessary   - Allow adequate time for meals  - Recommend/ encourage appropriate diets, oral nutritional supplements, and vitamin/mineral supplements  - Order, calculate, and assess calorie counts as needed  - Recommend, monitor, and adjust tube feedings and TPN/PPN based on assessed needs  - Assess need for intravenous fluids  - Provide specific nutrition/hydration education as appropriate  - Include patient/family/caregiver in decisions related to nutrition  Outcome: Progressing     Problem: MOBILITY - ADULT  Goal: Maintain or return to baseline ADL function  Description: INTERVENTIONS:  - Educate patient/family on patient safety including physical limitations  - Instruct patient to call for assistance with activity   - Consult OT/PT to assist with strengthening/mobility   - Keep Call bell within reach  - Keep bed low and locked with side rails adjusted as appropriate  - Keep care items and personal belongings within reach  - Initiate and maintain comfort rounds  - Make Fall Risk Sign visible to staff  - Offer Toileting every 2 Hours, in advance of need  - Initiate/Maintain bed/chair alarm  - Obtain necessary fall risk management equipment  - Apply yellow socks and bracelet for high fall risk patients  - Consider moving patient to room near nurses station  Outcome: Progressing  Goal: Maintains/Returns to pre admission functional level  Description: INTERVENTIONS:  - Perform BMAT or MOVE assessment daily    - Set and communicate daily mobility goal to care team and patient/family/caregiver     - Collaborate with rehabilitation services on mobility goals if consulted  - Perform Range of Motion 3 times a day  - Reposition patient every 2 hours    - Dangle patient 3 times a day  - Stand patient 3 times a day  - Ambulate patient 3 times a day  - Out of bed to chair 3 times a day   - Out of bed for meals 3 times a day  - Out of bed for toileting  - Record patient progress and toleration of activity level   Outcome: Progressing     Problem: Prexisting or High Potential for Compromised Skin Integrity  Goal: Skin integrity is maintained or improved  Description: INTERVENTIONS:  - Identify patients at risk for skin breakdown  - Assess and monitor skin integrity  - Assess and monitor nutrition and hydration status  - Monitor labs   - Assess for incontinence   - Turn and reposition patient  - Assist with mobility/ambulation  - Relieve pressure over bony prominences  - Avoid friction and shearing  - Provide appropriate hygiene as needed including keeping skin clean and dry  - Evaluate need for skin moisturizer/barrier cream  - Collaborate with interdisciplinary team   - Patient/family teaching  - Consider wound care consult   Outcome: Progressing

## 2022-10-26 NOTE — PLAN OF CARE
Problem: PHYSICAL THERAPY ADULT  Goal: Performs mobility at highest level of function for planned discharge setting  See evaluation for individualized goals  Description: Treatment/Interventions: Spoke to nursing, Gait training, Bed mobility, Patient/family training, Therapeutic exercise, LE strengthening/ROM, Functional transfer training  Equipment Recommended: Elva Liu       See flowsheet documentation for full assessment, interventions and recommendations  Outcome: Adequate for Discharge  Note: Prognosis: Good  Problem List: Decreased mobility  Assessment: The patient continues to demonstrate progress as he is ambulating far beyond community distances without the walker  He was able to  objects as well as dynamically look around without any reduction in anca during gait  He does continuet o have a reduced anca without the walker, but he does so safely  His gait is more normal and fluid with the walker, and he would benefit from a walker for community ambulation to enhance his comfort and safety  He readily completed the stairs as well  Continued therapy will assist in his return to normal gait without a device  Barriers to Discharge: None     PT Discharge Recommendation: Home with home health rehabilitation    See flowsheet documentation for full assessment

## 2022-10-26 NOTE — ASSESSMENT & PLAN NOTE
Lab Results   Component Value Date    HGBA1C 7 5 (H) 07/22/2022     Recent Labs     10/25/22  1141 10/25/22  1656 10/25/22  1659 10/25/22  2128 10/26/22  0758 10/26/22  1128   POCGLU 336* 101 116 180* 258* 291*     Management per endocrinology / Discharge medications: Glargine 10U HS, Lispro 6U TID    · Home insulin regimen Home regimen insulin NPH 18 units with breakfast, 2 units at bedtime, regular insulin 10 units with breakfast and dinner    · Continue basal / bolus regimen as written above on discharge

## 2022-10-26 NOTE — PLAN OF CARE
Problem: OCCUPATIONAL THERAPY ADULT  Goal: Performs self-care activities at highest level of function for planned discharge setting  See evaluation for individualized goals  Description: Treatment Interventions: ADL retraining, Functional transfer training, Cognitive reorientation, Endurance training, Energy conservation, Activityengagement, Compensatory technique education          See flowsheet documentation for full assessment, interventions and recommendations  Outcome: Progressing  Note: Limitation: Decreased cognition, Decreased self-care trans, Decreased high-level ADLs, Decreased Safe judgement during ADL, Decreased ADL status, Decreased endurance  Prognosis: Good  Assessment: pt participated in am ot session and was seen focusing on functional mobility / trnasfers, multistep commands following/ safety assessment and activity tolerence  pt reports that he does not have wounds on legs but is ntoted to have large dressings on b shins  pt was s for functional mobility and transfers with and without a d   pt does not use rw safely when approaching closet and dresser etc  pt was able to self correct multistep direction following  pt requires assistance for medication management and and for safe iadl completion  question if pts dtr can provide this assist  spoke c case management       OT Discharge Recommendation: No rehabilitation needs (vna for medication management, medical compliance)        April A Storm

## 2022-10-26 NOTE — PROGRESS NOTES
Progress Note - Alessandra Mchugh 74 y o  male MRN: [id number]    Unit/Bed#: [facility] 629-01 Encounter: [id number]      CC: diabetes f/u    Subjective: This is a 74y o -year-old male with Type 1 diabetes on longterm insulin, adrenal insufficiency on hydrocortisone admitted with hypoglycemia    Reports to eat 100% of his meals  Feels well  No complaints  No hypoglycemia  Objective:     Vitals: Blood pressure 168/92, pulse 79, temperature 97 8 °F (36 6 °C), resp  rate 16, height 5' 4" (1 626 m), weight 89 7 kg (197 lb 12 8 oz), SpO2 94 %  ,Body mass index is 33 95 kg/m²  Intake/Output Summary (Last 24 hours) at 10/26/2022 1515  Last data filed at 10/26/2022 1300  Gross per 24 hour   Intake 1200 ml   Output 1000 ml   Net 200 ml       Physical Exam:  General Appearance: awake, appears stated age and cooperative  Head: Normocephalic, without obvious abnormality, atraumatic  Extremities: moves all extremities  Skin: Skin color and temperature normal    Pulm: no labored breathing    Lab, Imaging and other studies: I have personally reviewed pertinent reports  POC Glucose (mg/dl)   Date Value   10/26/2022 291 (H)   10/26/2022 258 (H)   10/25/2022 180 (H)   10/25/2022 116   10/25/2022 101   10/25/2022 336 (H)   10/25/2022 253 (H)   10/24/2022 360 (H)   10/24/2022 332 (H)   10/24/2022 285 (H)       Assessment and plan:    Type 1 Diabetes mellitus  HbA1c: 7 5 (07/2022)  Home regimen insulin NPH 18 units with breakfast, 2 units at bedtime, regular insulin 10 units with breakfast and dinner    Inpatient regimen: Lantus 5U at bedtime and lispro 3U TID    Recommendations:  Overall blood glucose above target range  Increase Lantus to 10 units from 8 units at bedtime  Increase lispro to 6 units from 5 units with meals  Continue with correctional scale algorithm 1 with meals and at bedtime  Will continue to monitor blood glucose and make adjustments as needed  If the patient were to ge t discharged, would recommend discharging on Lantus 10 units at bedtime, lispro 6 units with meals  Close follow-up with endocrinologist outpatient is recommended  Patient follows up with [facility] (Dr Manuela Mauricio)     Secondary adrenal insufficiency  C/w hydrocortisone 10mg BID and fludrocortisone 0 2mg daily    Portions of the record may have been created with voice recognition software

## 2022-10-26 NOTE — ASSESSMENT & PLAN NOTE
Type 1 diabetic presented with encephalopathy in setting of hypoglycemia, required glucose from EMS  Was a rapid response 10/19 and required intubation secondary to symptomatic hypoglycemia/altered mental status  Blood sugar was 30, i-STAT less than 20  Subsequently extubated and transferred out of ICU  · Cleared by endocrinology for discharge   Discharge with Lantus 10U HS and humalog 6U TID with meals  · Monitor Accu-Cheks closely  · PT/OT cleared for home

## 2022-10-27 NOTE — UTILIZATION REVIEW
NOTIFICATION OF ADMISSION DISCHARGE   This is a Notification of Discharge from [address]  Please be advised that this patient has been discharge from our facility  Below you will find the admission and discharge date and time including the patient’s disposition  UTILIZATION REVIEW CONTACT:  Josie Ellsworth  Utilization   Network Utilization Review Department  Phone: [phone] x carefully listen to the prompts  All voicemails are confidential   Email: [email or web address]     ADMISSION INFORMATION  PRESENTATION DATE: 10/19/2022  6:33 AM  OBERVATION ADMISSION DATE:   INPATIENT ADMISSION DATE: 10/19/22 10:21 AM   DISCHARGE DATE: 10/26/2022  6:45 PM  DISPOSITION: Home with [place] with [address] INFORMATION:  Send all requests for admission clinical reviews, approved or denied determinations and any other requests to dedicated fax number below belonging to the campus where the patient is receiving treatment   List of dedicated fax numbers:  [address] DENIALS (Administrative/Medical Necessity) [phone]   [address] (Maternity/NICU/Pediatrics) [phone]   [facility] [phone]   [place] [address] [phone]   [address] [phone]   220 [facility] [phone] 90 [facility] [phone]   [address] [place], [place] [address] [phone]   [facility]  [phone]   4057 [facility] [phone] 412 [facility] [address] [facility] [phone]

## 2022-10-28 ENCOUNTER — HOME CARE VISIT (OUTPATIENT)
Dept: HOME HEALTH SERVICES | Facility: HOME HEALTHCARE | Age: 67
End: 2022-10-28

## 2022-10-28 ENCOUNTER — TELEPHONE (OUTPATIENT)
Dept: ENDOCRINOLOGY | Facility: CLINIC | Age: 67
End: 2022-10-28

## 2022-10-28 NOTE — CASE COMMUNICATION
TT from [address] [place] [address] reporting patients daughter would like an update from todays visit  TC with daughter to report that patient does not have a taxing effort to leave the home and is not homebound  Daughter said she does not have time to take patient to physical therapy outpatient  Daughter would like a referral to [address] [place] [address]

## 2022-10-28 NOTE — TELEPHONE ENCOUNTER
Called and LVM  to give [facility] office a call back    Doctor suggest that patient follows up with PCP  Please keep current appt

## 2022-10-28 NOTE — TELEPHONE ENCOUNTER
Pt's daughter called to schedule an appt  He is sched for 11/14 here at the [facility] office with Dr Elen Mckeon and put on the waitlist  Pt's daughter asked if we could get him in sooner because his sugar keeps dropping, and he is in and out of the hospital        Elaine from [facility] is the care manager over there called and wanted to speak with the nurse or MA with Dr Elen Mckeon to give some info about the pt  Please advise

## 2022-10-28 NOTE — CASE COMMUNICATION
Notification of Assess not Admit    [facility] for [facility] services and has determined the patient is not eligible for service due to the following: Patient is not homebound and does not want to pay out of pocket for VNA services  Please order outpatient diabetes education classes, outpatient therapy  RN spent 30 minutes educating patient on purpose of insulin, SS of hyper hypoglycemia, when to report t o the MD, Glucose log keeping, DM diet  Patient able to steadily walk unaided and patient and significant other reports no taxing effort to leave home  Johanna Avelar RN

## 2022-10-31 ENCOUNTER — TELEPHONE (OUTPATIENT)
Dept: NEUROLOGY | Facility: CLINIC | Age: 67
End: 2022-10-31

## 2022-10-31 NOTE — TELEPHONE ENCOUNTER
Called and spoke with patient daughter to inform of upcoming appointment with Dr Sylvia Granados on 11/22/2022 in [place] location  Patient daughter agree with appointment

## 2022-11-01 RX ORDER — RISPERIDONE 1 MG/1
1 TABLET, FILM COATED ORAL 2 TIMES DAILY
COMMUNITY

## 2022-11-01 RX ORDER — UBIDECARENONE 75 MG
CAPSULE ORAL
COMMUNITY
Start: 2022-09-29

## 2022-11-01 RX ORDER — VENLAFAXINE HYDROCHLORIDE 150 MG/1
1 TABLET, EXTENDED RELEASE ORAL 2 TIMES DAILY
COMMUNITY

## 2022-11-01 RX ORDER — INSULIN ASPART INJECTION 100 [IU]/ML
INJECTION, SOLUTION SUBCUTANEOUS
COMMUNITY

## 2022-11-01 RX ORDER — AMLODIPINE BESYLATE 2.5 MG/1
1 TABLET ORAL
COMMUNITY
End: 2022-11-03

## 2022-11-01 RX ORDER — INSULIN HUMAN 100 [IU]/ML
INJECTION, SUSPENSION SUBCUTANEOUS
COMMUNITY
Start: 2022-10-10

## 2022-11-01 RX ORDER — INSULIN GLARGINE 100 [IU]/ML
INJECTION, SOLUTION SUBCUTANEOUS
COMMUNITY
End: 2022-11-01 | Stop reason: SDUPTHER

## 2022-11-01 RX ORDER — AMLODIPINE BESYLATE 2.5 MG/1
2.5 TABLET ORAL DAILY
COMMUNITY
Start: 2022-07-25 | End: 2022-11-01 | Stop reason: SDUPTHER

## 2022-11-01 RX ORDER — TEMAZEPAM 30 MG/1
30 CAPSULE ORAL DAILY PRN
COMMUNITY

## 2022-11-01 RX ORDER — HYDROCORTISONE 20 MG/1
TABLET ORAL
COMMUNITY
Start: 2022-09-29

## 2022-11-01 NOTE — TELEPHONE ENCOUNTER
Spoke to daughter Cesar Garnica  She states that patient has been put on Lantus and HumaLOG and that the current regimen has been working great for the patient  There was a prescription that was sent for Rayaldee 30g to be taken at bedtime  Patient's daughter states that the cost is too much even with insurance and there are no coupons  Patient's daughter asks if that medication is actually needed?

## 2022-11-03 ENCOUNTER — OFFICE VISIT (OUTPATIENT)
Dept: CARDIOLOGY CLINIC | Facility: CLINIC | Age: 67
End: 2022-11-03

## 2022-11-03 VITALS
BODY MASS INDEX: 35.13 KG/M2 | HEART RATE: 65 BPM | HEIGHT: 64 IN | TEMPERATURE: 98 F | WEIGHT: 205.8 LBS | DIASTOLIC BLOOD PRESSURE: 62 MMHG | OXYGEN SATURATION: 98 % | SYSTOLIC BLOOD PRESSURE: 120 MMHG

## 2022-11-03 DIAGNOSIS — E27.40 ADRENAL INSUFFICIENCY (HCC): Primary | ICD-10-CM

## 2022-11-03 DIAGNOSIS — I95.1 ORTHOSTATIC HYPOTENSION: ICD-10-CM

## 2022-11-03 DIAGNOSIS — I21.A1 TYPE 2 MI (MYOCARDIAL INFARCTION) (HCC): ICD-10-CM

## 2022-11-03 DIAGNOSIS — I48.0 PAROXYSMAL ATRIAL FIBRILLATION (HCC): ICD-10-CM

## 2022-11-03 NOTE — PROGRESS NOTES
Cardiology Consultation     Jason Miller  [id number]  1955  [facility] [address]  [facility] [address] [address] [phone]      1  Adrenal insufficiency   POCT ECG   2  Paroxysmal atrial fibrillation   POCT ECG   3  Type 2 MI (myocardial infarction) ([address] )  POCT ECG   4  Orthostatic hypotension         Discussion/Summary:    Type 2 MI - although his daughter tells me that he was told he had a heart attack, reviewing the data and the notes, this appears more to be a type 2 MI in the setting of DKA  His troponin did go greater than 15,000, but his LVEF is preserved without any regional wall motion abnormalities any subsequently had a pharmacologic nuclear stress test which was without any ischemia or infarction  He has absolutely no chest pain and denies any cardiac symptoms to me  As such, I see no indication to proceed with any invasive testing such as cardiac catheterization  He is diabetic  There risk factors for coronary disease in his chest imaging does show some coronary calcifications  He is on a baby aspirin in addition to anticoagulation with Eliquis  For now, he is tolerating this  He is on 10 mg of atorvastatin  I will monitor his lipid panel  His blood pressure is typically labile with his autonomic insufficiency and orthostatic hypotension in the past   He is on a very low dose of metoprolol  See below, I am planning to discontinue his amlodipine  If he were to have any symptoms of chest pain, angina, etc   in the future, would have a low threshold to proceed with cardiac catheterization  Autonomic insufficiency, orthostatic hypotension: This predates his recent admissions  He was seeing [facility] previously for this  He is on Florinef 0 2 mg, and currently takes metoprolol as needed  He has only needed a few doses since leaving the hospital   He stays adequately hydrated  I have recommended discontinuing the amlodipine  He is little bit of lower extremity edema  In the past, he was hypokalemic when he was getting Lasix  Certainly I would not put him on a loop diuretic currently especially with his on a nick insufficiency and orthostatic hypotension  If his blood pressure stabilizes off of the amlodipine, then maybe we can start to back off of Florinef in the future  Continue the midodrine just as needed  Paroxysmal atrial fibrillation was in the setting of a critical illness, he is back in sinus rhythm  Continue low-dose of metoprolol  He did have a history of CVA, so would recommend continuing anticoagulation with Eliquis long-term  He is on both aspirin as well as Eliquis currently  If there any bleeding problems in the future aside from the easy bruising he is having currently, then I would stop the aspirin  History of Present Illness:    74-year-old gentleman comes to the office today for the 1st time  He is accompanied by his daughter  His daughter is a CNA  She helps to provide some collateral history  I also reviewed the medical record extensively  He has had some hospitalizations at [facility], and previously saw cardiologist to the [facility]  Prior to the recent hospitalizations, he had a history of adrenal insufficiency and orthostatic hypotension with autonomic insufficiency in this setting  He had been on midodrine and Florinef  Then more recently, he had an episode of DKA  He was critically ill in the hospital   At that time, his daughter tells me that they told her that they were concerned there was an MI that precipitated his DKA  That said, I reviewed the cardiology consultation  And his testing  He had a preserved ejection fraction on his echocardiogram, although the troponin went to greater than 15,000  He did not have a cardiac catheterization    He ultimately had a pharmacologic nuclear stress test which was read as normal     After his discharge, he had a few episodes of hypoglycemia and some loss of consciousness episodes which prompted readmission or rehospitalization  He has now been home from rehab for a few weeks  His daughter takes his blood pressure 3 times a day and give midodrine only if blood pressure is low  However, he has also been on amlodipine 2 5 mg as well as metoprolol 12 5 mg twice a day  He did have paroxysmal atrial fibrillation during 1 of his ICU stays and has been on Eliquis  There was also imaging done that showed evidence of CVA  Patient Active Problem List   Diagnosis   • Recurrent hypoglycemia   • Insulin dependent type 1 diabetes mellitus    • Adrenal insufficiency    • Essential hypertension   • Paroxysmal atrial fibrillation    • History of stroke   • Anemia of chronic disease   • Psychiatric disorder   • Type 2 MI (myocardial infarction) ([facility] )   • Orthostatic hypotension     Past Medical History:   Diagnosis Date   • Diabetes mellitus ([facility] )      Social History     Tobacco Use   • Smoking status: Former Smoker     Years: 3 00     Types: Cigarettes   Substance Use Topics   • Alcohol use: Not Currently     Alcohol/week: 5 0 standard drinks     Types: 5 Cans of beer per week     Comment: Quit in august   • Drug use: Never      History reviewed  No pertinent family history  No past surgical history on file      Current Outpatient Medications:   •  apixaban (ELIQUIS) 5 mg, Take 5 mg by mouth 2 (two) times a day, Disp: , Rfl:   •  aspirin 81 mg chewable tablet, Chew 81 mg daily, Disp: , Rfl:   •  atorvastatin (LIPITOR) 10 mg tablet, Take 1 tablet by mouth daily, Disp: , Rfl:   •  cyanocobalamin (VITAMIN B-12) 100 mcg tablet, , Disp: , Rfl:   •  fludrocortisone (FLORINEF) 0 1 mg tablet, Take 0 2 mg by mouth daily, Disp: , Rfl:   •  HumuLIN N KwikPen 100 units/mL injection pen, , Disp: , Rfl:   •  hydrocortisone (CORTEF) 10 mg tablet, Take 10 mg by mouth 2 (two) times a day, Disp: , Rfl:   •  Insulin Aspart, w/Niacinamide, (Fiasp) 100 UNIT/ML SOLN, as directed, Disp: , Rfl:   •  Insulin Glargine Solostar (Lantus SoloStar) 100 UNIT/ML SOPN, Inject 0 1 mL (10 Units total) under the skin daily at bedtime, Disp: 3 mL, Rfl: 0  •  insulin lispro (HumaLOG KwikPen) 100 units/mL injection pen, Inject 6 Units under the skin 3 (three) times a day with meals, Disp: 15 mL, Rfl: 0  •  Insulin Pen Needle (BD Pen Needle Blessing 2nd Gen) 32G X 4 MM MISC, For use with insulin pen  Pharmacy may dispense brand covered by insurance , Disp: 100 each, Rfl: 0  •  metoprolol tartrate (LOPRESSOR) 25 mg tablet, Take 12 5 mg by mouth 2 (two) times a day, Disp: , Rfl:   •  midodrine (PROAMATINE) 5 mg tablet, Take 1 tablet (5 mg total) by mouth 3 (three) times a day as needed (sbp < 110  Otherwise hold the medication), Disp: 30 tablet, Rfl: 0  •  NON FORMULARY, Take 30 mcg by mouth daily at bedtime Rayaldee 30 mcg @ bedtime, Disp: , Rfl:   •  NON FORMULARY, 1 Bottle if needed Relion Glucose, Disp: , Rfl:   •  potassium chloride (Klor-Con) 10 mEq tablet, Take 20 mEq by mouth 2 (two) times a day, Disp: , Rfl:   •  risperiDONE (RisperDAL) 1 mg tablet, Take 1 tablet by mouth 2 (two) times a day, Disp: , Rfl:   •  sertraline (ZOLOFT) 100 mg tablet, Take 100 mg by mouth daily, Disp: , Rfl:   •  hydrocortisone (CORTEF) 20 mg tablet, , Disp: , Rfl:   •  Insulin Pen Needle (BD Pen Needle Blessing 2nd Gen) 32G X 4 MM MISC, For use with insulin pen  Pharmacy may dispense brand covered by insurance   (Patient not taking: Reported on 11/3/2022), Disp: 100 each, Rfl: 0  •  risperiDONE (RisperDAL) 0 5 mg tablet, Take 1 tablet by mouth 2 (two) times a day (Patient not taking: Reported on 11/3/2022), Disp: , Rfl:   •  temazepam (RESTORIL) 30 mg capsule, Take 30 mg by mouth daily as needed (Patient not taking: Reported on 11/3/2022), Disp: , Rfl:   •  venlafaxine 150 MG TB24 24 hr tablet, Take 1 tablet by mouth 2 (two) times a day (Patient not taking: Reported on 11/3/2022), Disp: , Rfl:   Allergies   Allergen Reactions   • Imipramine Other (See Comments)   • Lisinopril Other (See Comments)   • Paroxetine Other (See Comments)   • Penicillins Hives   • Rosiglitazone Other (See Comments)   • Troglitazone Other (See Comments)       Vitals:    11/03/22 1522   BP: 120/62   BP Location: Left arm   Patient Position: Sitting   Cuff Size: Large   Pulse: 65   Temp: 98 °F (36 7 °C)   SpO2: 98%   Weight: 93 4 kg (205 lb 12 8 oz)   Height: 5' 4" (1 626 m)     Vitals:    11/03/22 1522   Weight: 93 4 kg (205 lb 12 8 oz)      Height: 5' 4" (162 6 cm)   Body mass index is 35 33 kg/m²  Physical Exam:  GENERAL: NAD  Slightly flat affect  HEENT:  PERRL, EOMI, no scleral icterus, no conjunctival pallor  NECK:  Supple, No elevated JVP, no thyromegaly, no carotid bruits  HEART:  Regular rate and rhythm, normal S1/S2, no S3/S4, no murmur or rub  LUNGS:  Clear to auscultation bilaterally  ABDOMEN:  Soft, non-tender, positive bowel sounds, no rebound or guarding  EXTREMITIES:  1+ LE edema  VASCULAR:  Normal pedal pulses   NEURO: Nonfocal neurologic exam   SKIN: Normal without suspicious lesions on exposed skin    ROS:  Positive for edema, weakness, gait difficulty  Poor memory/recall  Except as noted in HPI, is otherwise reviewed in detail and a 12 point review of systems is negative  Labs:  Lab Results   Component Value Date    SODIUM 139 10/26/2022    K 4 3 10/26/2022     10/26/2022    CREATININE 1 23 10/26/2022    BUN 10 10/26/2022    CO2 26 10/26/2022    ALT 15 10/20/2022    AST 7 10/20/2022    HGBA1C 7 5 (H) 07/22/2022    WBC 8 11 10/21/2022    HGB 8 5 (L) 10/21/2022    HCT 26 6 (L) 10/21/2022     10/21/2022       No results found for: CHOL  No results found for: HDL  No results found for: LDLCALC  No results found for: TRIG    Testing:  Pharm Stress 8/22/22: This result has an attachment that is not available     1  No abnormal ST/T changes with pharmacologic stress  2  Successful administration of pharmacologic stress  3  Nuclear perfusion imaging to follow and to be reported separately by   Radiology  Resting ECG   ECG is abnormal  non specific t wave abnormality The ECG shows normal sinus rhythm  Stress Findings   A pharmacological stress test was performed using 0 4 mg of regadenoson IV push over 10 seconds  The patient had a maximal HR of 89 bpm (57 % of MPHR) 1 0 METS  The patient reached the end of the protocol  The patient reported no symptoms during the stress test  Blood pressure demonstrated a normal response and heart rate demonstrated a normal response to stress  The patient's heart rate recovery was normal      Stress ECG   No ST deviation was noted  There were no arrhythmias during stress  There were no arrhythmias during recovery  Fixed and mildly decreased perfusion is seen at small portion of proximal   inferior wall, likely secondary to mild diaphragmatic attenuation  There is no   evidence of reversible perfusion defect to suggest ischemia  No significant   transient ischemic dilatation is seen on the stress phase images  There is   slightly heterogeneous, but normal radiotracer labeling throughout the rest of   the left ventricular myocardium  There is normal left ventricular wall motion  The calculated left ventricular   ejection fraction is 66% which is above the lower limit of normal at 45%  Echo 8/17/22  •  Left Ventricle: Left ventricle is normal in size  Systolic function is   normal with an ejection fraction of 55-60%  There is grade I (mild)   diastolic dysfunction  •  Right Ventricle: Systolic function is normal    •  Aortic Valve: The aortic valve is trileaflet  The leaflets are mildly   thickened  The leaflets are mildly calcified  There is mild regurgitation  •  Mitral Valve: There is annular calcification  There is trace   regurgitation  •  Tricuspid Valve: There is trace regurgitation     •  Pulmonic Valve: There is trace regurgitation  PA pressure not   calculated due to incomplete TR signal      EKG:  Sinus rhythm, 65 beats per minute  PACs  Low voltage

## 2022-11-08 DIAGNOSIS — E27.40 ADRENAL INSUFFICIENCY (HCC): ICD-10-CM

## 2022-11-08 DIAGNOSIS — N18.5 STAGE 5 CHRONIC KIDNEY DISEASE NOT ON CHRONIC DIALYSIS (HCC): Primary | ICD-10-CM

## 2022-11-08 DIAGNOSIS — I10 ESSENTIAL HYPERTENSION: ICD-10-CM

## 2022-11-08 DIAGNOSIS — I21.A1 TYPE 2 MI (MYOCARDIAL INFARCTION) (HCC): Primary | ICD-10-CM

## 2022-11-08 RX ORDER — FLUDROCORTISONE ACETATE 0.1 MG/1
0.2 TABLET ORAL DAILY
Qty: 60 TABLET | Refills: 5 | Status: SHIPPED | OUTPATIENT
Start: 2022-11-08 | End: 2023-11-08

## 2022-11-08 RX ORDER — CALCITRIOL 0.25 UG/1
0.25 CAPSULE, LIQUID FILLED ORAL DAILY
Qty: 30 CAPSULE | Refills: 1 | Status: SHIPPED | OUTPATIENT
Start: 2022-11-08

## 2022-11-08 NOTE — TELEPHONE ENCOUNTER
Dr Alyse Garcia, Patient daughter asking for refill on Eliquis which I sent it already  She is also asking about Fludrocortisone (Florinef) 0 1mg tablet  Is this okay to fill as well?

## 2022-11-14 ENCOUNTER — OFFICE VISIT (OUTPATIENT)
Dept: ENDOCRINOLOGY | Facility: CLINIC | Age: 67
End: 2022-11-14

## 2022-11-14 VITALS
BODY MASS INDEX: 35.51 KG/M2 | WEIGHT: 208 LBS | SYSTOLIC BLOOD PRESSURE: 120 MMHG | HEART RATE: 93 BPM | HEIGHT: 64 IN | DIASTOLIC BLOOD PRESSURE: 70 MMHG | OXYGEN SATURATION: 90 %

## 2022-11-14 DIAGNOSIS — E10.9 INSULIN DEPENDENT TYPE 1 DIABETES MELLITUS (HCC): ICD-10-CM

## 2022-11-14 DIAGNOSIS — E27.40 ADRENAL INSUFFICIENCY (HCC): ICD-10-CM

## 2022-11-14 DIAGNOSIS — E66.01 OBESITY, MORBID (HCC): ICD-10-CM

## 2022-11-14 DIAGNOSIS — E10.65 TYPE 1 DIABETES MELLITUS WITH HYPERGLYCEMIA (HCC): Primary | ICD-10-CM

## 2022-11-14 RX ORDER — INSULIN GLARGINE 100 [IU]/ML
11 INJECTION, SOLUTION SUBCUTANEOUS
Qty: 15 ML | Refills: 0 | Status: SHIPPED | OUTPATIENT
Start: 2022-11-14 | End: 2022-12-14

## 2022-11-14 NOTE — PROGRESS NOTES
Follow-up Patient Progress Note      CC: type 1 diabetes    History of Present Illness:   74 yr male with Type 1 diabetes since age 39yr with brittle diabetes, hypertension, obesity, hyperlipidemia, atrial fibrillation, orthostasis, anxiety/depression, anemia, adrenal insufficiency and coronary artery disease  Last visit was during hospitalization recently  He has a longstanding history of type 1 diabetes and adrenal insufficiency that is now associated with brittle diabetes  Home blood glucose monitoring:  Check 3 to 4 times a day over the last few weeks since and Dexcom transmitter was not working  Fasting glucose in 130-220 mg/dL and postprandial in  mg/dL  Hypoglycemia:  No    Current meds:  Lantus 10 units q h s  Humalog KwikPen 6 units with each meal  Hydrocortisone 10 mg twice daily regimen    Opthamology: yes  Podiatry: no  vaccination: yes  Dental:  Pancreatitis: no    Ace/ARB: no  Statin:  Lipitor  Thyroid issues:  No    Patient Active Problem List   Diagnosis   • Recurrent hypoglycemia   • Insulin dependent type 1 diabetes mellitus    • Adrenal insufficiency    • Essential hypertension   • Paroxysmal atrial fibrillation    • History of stroke   • Anemia of chronic disease   • Psychiatric disorder   • Type 2 MI (myocardial infarction) ([place] [address] )   • Orthostatic hypotension     Past Medical History:   Diagnosis Date   • Diabetes mellitus ([place] [address] )       History reviewed  No pertinent surgical history  History reviewed  No pertinent family history    Social History     Tobacco Use   • Smoking status: Former Smoker     Years: 3 00     Types: Cigarettes   • Smokeless tobacco: Never Used   Substance Use Topics   • Alcohol use: Not Currently     Alcohol/week: 5 0 standard drinks     Types: 5 Cans of beer per week     Comment: Quit in august     Allergies   Allergen Reactions   • Imipramine Other (See Comments)   • Lisinopril Other (See Comments)   • Paroxetine Other (See Comments)   • Penicillins Hives • Rosiglitazone Other (See Comments)   • Troglitazone Other (See Comments)         Current Outpatient Medications:   •  apixaban (ELIQUIS) 5 mg, Take 1 tablet (5 mg total) by mouth 2 (two) times a day, Disp: 60 tablet, Rfl: 2  •  aspirin 81 mg chewable tablet, Chew 81 mg daily, Disp: , Rfl:   •  atorvastatin (LIPITOR) 10 mg tablet, Take 1 tablet by mouth daily, Disp: , Rfl:   •  calcitriol (ROCALTROL) 0 25 mcg capsule, Take 1 capsule (0 25 mcg total) by mouth daily, Disp: 30 capsule, Rfl: 1  •  cyanocobalamin (VITAMIN B-12) 100 mcg tablet, Take by mouth daily, Disp: , Rfl:   •  fludrocortisone (FLORINEF) 0 1 mg tablet, Take 2 tablets (0 2 mg total) by mouth daily, Disp: 60 tablet, Rfl: 5  •  hydrocortisone (CORTEF) 10 mg tablet, Take 10 mg by mouth 2 (two) times a day, Disp: , Rfl:   •  Insulin Glargine Solostar (Lantus SoloStar) 100 UNIT/ML SOPN, Inject 0 1 mL (10 Units total) under the skin daily at bedtime, Disp: 3 mL, Rfl: 0  •  insulin lispro (HumaLOG KwikPen) 100 units/mL injection pen, Inject 6 Units under the skin 3 (three) times a day with meals, Disp: 15 mL, Rfl: 0  •  Insulin Pen Needle (BD Pen Needle Blessing 2nd Gen) 32G X 4 MM MISC, For use with insulin pen  Pharmacy may dispense brand covered by insurance , Disp: 100 each, Rfl: 0  •  metoprolol tartrate (LOPRESSOR) 25 mg tablet, Take 12 5 mg by mouth 2 (two) times a day, Disp: , Rfl:   •  midodrine (PROAMATINE) 5 mg tablet, Take 1 tablet (5 mg total) by mouth 3 (three) times a day as needed (sbp < 110   Otherwise hold the medication), Disp: 30 tablet, Rfl: 0  •  NON FORMULARY, 1 Bottle if needed Relion Glucose, Disp: , Rfl:   •  potassium chloride (Klor-Con) 10 mEq tablet, Take 20 mEq by mouth 2 (two) times a day, Disp: , Rfl:   •  sertraline (ZOLOFT) 100 mg tablet, Take 100 mg by mouth daily, Disp: , Rfl:   •  HumuLIN N KwikPen 100 units/mL injection pen, , Disp: , Rfl:   •  hydrocortisone (CORTEF) 20 mg tablet, , Disp: , Rfl:   •  Insulin Aspart, w/Niacinamide, (Fiasp) 100 UNIT/ML SOLN, as directed (Patient not taking: Reported on 11/14/2022), Disp: , Rfl:   •  Insulin Pen Needle (BD Pen Needle Blessing 2nd Gen) 32G X 4 MM MISC, For use with insulin pen  Pharmacy may dispense brand covered by insurance  (Patient not taking: No sig reported), Disp: 100 each, Rfl: 0  •  NON FORMULARY, Take 30 mcg by mouth daily at bedtime Rayaldee 30 mcg @ bedtime (Patient not taking: Reported on 11/14/2022), Disp: , Rfl:   •  risperiDONE (RisperDAL) 0 5 mg tablet, Take 1 tablet by mouth 2 (two) times a day (Patient not taking: No sig reported), Disp: , Rfl:   •  risperiDONE (RisperDAL) 1 mg tablet, Take 1 tablet by mouth 2 (two) times a day, Disp: , Rfl:   •  temazepam (RESTORIL) 30 mg capsule, Take 30 mg by mouth daily as needed (Patient not taking: No sig reported), Disp: , Rfl:   •  venlafaxine 150 MG TB24 24 hr tablet, Take 1 tablet by mouth 2 (two) times a day (Patient not taking: No sig reported), Disp: , Rfl:     Review of Systems   Constitutional: Positive for activity change, appetite change and fatigue  HENT: Negative  Eyes: Negative  Respiratory: Negative  Cardiovascular: Negative for chest pain  Gastrointestinal: Negative  Endocrine: Negative  Genitourinary: Negative  Musculoskeletal: Negative  Skin: Negative  Allergic/Immunologic: Negative  Neurological: Negative  Hematological: Negative  Psychiatric/Behavioral: Negative  All other systems reviewed and are negative  Physical Exam:  Body mass index is 35 7 kg/m²  /70   Pulse 93   Ht 5' 4" (1 626 m)   Wt 94 3 kg (208 lb)   SpO2 90%   BMI 35 70 kg/m²    Vitals:    11/14/22 0946   Weight: 94 3 kg (208 lb)        Physical Exam  Constitutional:       Appearance: He is well-developed  HENT:      Head: Normocephalic  Eyes:      Pupils: Pupils are equal, round, and reactive to light  Neck:      Thyroid: No thyromegaly     Cardiovascular:      Rate and Rhythm: Normal rate  Heart sounds: Normal heart sounds  Pulmonary:      Effort: Pulmonary effort is normal       Breath sounds: Normal breath sounds  Abdominal:      General: Bowel sounds are normal       Palpations: Abdomen is soft  Musculoskeletal:         General: No deformity  Cervical back: Normal range of motion  Skin:     Capillary Refill: Capillary refill takes less than 2 seconds  Coloration: Skin is not pale  Findings: No rash  Neurological:      Mental Status: He is alert and oriented to person, place, and time  Labs:   Lab Results   Component Value Date    HGBA1C 7 5 (H) 07/22/2022       No results found for: [id number], TSH, S9UYFCM, Z8VBLOS, THYROIDAB    Lab Results   Component Value Date    CREATININE 1 23 10/26/2022    CREATININE 1 31 (H) 10/25/2022    CREATININE 1 00 10/21/2022    BUN 10 10/26/2022    K 4 3 10/26/2022     10/26/2022    CO2 26 10/26/2022     eGFR   Date Value Ref Range Status   10/26/2022 61 ml/min/1 73sq m Final       Lab Results   Component Value Date    ALT 15 10/20/2022    AST 7 10/20/2022    ALKPHOS 64 10/20/2022       No results found for: CHOLESTEROL  No results found for: HDL  No results found for: TRIG  No results found for: NONHDLC      Impression:  1  Type 1 diabetes mellitus with hyperglycemia (HCC)    2  Adrenal insufficiency     3  Insulin dependent type 1 diabetes mellitus     4  Obesity, morbid ([facility] )         Plan:    Diagnoses and all orders for this visit:    Type 1 diabetes mellitus with hyperglycemia ([facility] )  He is uncontrolled with brittle diabetes associated with adrenal insufficiency  Recent A1c was 7 5%  Goal is 7%  Today we discussed options and based on his reported glucose logs, be adjusted basal bolus and correctional insulin as follows  He does not feel comfortable to use an insulin pump  Advised to restart Dexcom sensor as soon as possible and send me data in 3-4 weeks for further dose titration    Goal capillary/interstitial glucose is  mg/dL  He seems to have some hypoglycemia unawareness  Follow-up in 6 weeks  Humalog Insulin   6u   6u   6u    Regular, Apidra, Humalog orNovolog Sliding Scale:   <80              151-220 +1  +1 +1    221-290 +2 +2 +2 +   291-360 +3 +3 +3 +   361-430 +4 +4 +4 +   >430 +5 +5 +5 +       Lantus Insulin    11u       -     Ambulatory referral to Diabetic Education; Future  -     Insulin Glargine Solostar (Lantus SoloStar) 100 UNIT/ML SOPN; Inject 0 11 mL (11 Units total) under the skin daily at bedtime    Adrenal insufficiency   Continue hydrocortisone 10 mg 7:00 a m  and 10 mg at 2 p m  Obesity, morbid ([address] )  Advised daily activity  I have spent 35 minutes with patient today in which greater than 50% of this time was spent in counseling/coordination of care  Discussed with the patient and all questioned fully answered  He will call me if any problems arise  Educated/ Counseled patient on diagnostic test results, prognosis, risk vs benefit of treatment options, importance of treatment compliance, healthy life and lifestyle choices        [address]

## 2022-11-14 NOTE — PATIENT INSTRUCTIONS
INSULIN DOSAGE INSTRUCTIONS    Name: Jayce Copeland                        : 1955  MRN #: [id number]    Your Current Insulin  and dose is: Before Breakfast Before Lunch Before Evening Meal Bedtime     Humalog Insulin   6u   6u   6u    Regular, Apidra, Humalog orNovolog Sliding Scale:   <80              151-220 +1  +1 +1    221-290 +2 +2 +2 +   291-360 +3 +3 +3 +   361-430 +4 +4 +4 +   >430 +5 +5 +5 +       Lantus Insulin    11u     Additional Instructions:   Please test your blood sugar:  _4_ Times per day  X_ Before Breakfast                _ Alternate Testing  X_ Before Lunch                _ 2 Hours After  Meal  X_ Before Evening Meal               _ 3 a m   x_ Before Bedtime Snack     Target Blood sugar range _90_to _200__  Call if your Lamonte Prader, MD  blood sugar is less than _60_ or greater than _400__      Today's Date: 2022

## 2022-11-22 ENCOUNTER — TELEPHONE (OUTPATIENT)
Dept: ENDOCRINOLOGY | Facility: CLINIC | Age: 67
End: 2022-11-22

## 2022-11-22 NOTE — TELEPHONE ENCOUNTER
Pt's daughter left msg that sugar has been high today and pt seems pretty tired  His bp is ok  Even with insulin today's numbers are [phone]    Please call daughter or send her a msg

## 2022-11-28 ENCOUNTER — APPOINTMENT (EMERGENCY)
Dept: RADIOLOGY | Facility: HOSPITAL | Age: 67
End: 2022-11-28

## 2022-11-28 ENCOUNTER — HOSPITAL ENCOUNTER (INPATIENT)
Facility: HOSPITAL | Age: 67
LOS: 10 days | Discharge: HOME | End: 2022-12-08
Attending: EMERGENCY MEDICINE | Admitting: STUDENT IN AN ORGANIZED HEALTH CARE EDUCATION/TRAINING PROGRAM

## 2022-11-28 ENCOUNTER — APPOINTMENT (INPATIENT)
Dept: RADIOLOGY | Facility: HOSPITAL | Age: 67
End: 2022-11-28

## 2022-11-28 DIAGNOSIS — E10.9 INSULIN DEPENDENT TYPE 1 DIABETES MELLITUS (HCC): ICD-10-CM

## 2022-11-28 DIAGNOSIS — E27.40 ADRENAL INSUFFICIENCY (HCC): ICD-10-CM

## 2022-11-28 DIAGNOSIS — R63.4 UNINTENTIONAL WEIGHT LOSS: ICD-10-CM

## 2022-11-28 DIAGNOSIS — I63.81 BASAL GANGLIA INFARCTION (HCC): ICD-10-CM

## 2022-11-28 DIAGNOSIS — I95.9 HYPOTENSION: Primary | ICD-10-CM

## 2022-11-28 PROBLEM — I63.9 CVA (CEREBRAL VASCULAR ACCIDENT) (HCC): Status: ACTIVE | Noted: 2022-11-28

## 2022-11-28 LAB
2HR DELTA HS TROPONIN: -2 NG/L
4HR DELTA HS TROPONIN: -3 NG/L
ALBUMIN SERPL BCP-MCNC: 3.6 G/DL (ref 3.5–5)
ALP SERPL-CCNC: 77 U/L (ref 46–116)
ALT SERPL W P-5'-P-CCNC: 19 U/L (ref 12–78)
ANION GAP SERPL CALCULATED.3IONS-SCNC: 3 MMOL/L (ref 4–13)
AST SERPL W P-5'-P-CCNC: 20 U/L (ref 5–45)
ATRIAL RATE: 69 BPM
BASOPHILS # BLD AUTO: 0.03 THOUSANDS/ÂΜL (ref 0–0.1)
BASOPHILS NFR BLD AUTO: 0 % (ref 0–1)
BILIRUB SERPL-MCNC: 0.39 MG/DL (ref 0.2–1)
BUN SERPL-MCNC: 16 MG/DL (ref 5–25)
CALCIUM SERPL-MCNC: 9.3 MG/DL (ref 8.3–10.1)
CARDIAC TROPONIN I PNL SERPL HS: 10 NG/L
CARDIAC TROPONIN I PNL SERPL HS: 7 NG/L
CARDIAC TROPONIN I PNL SERPL HS: 8 NG/L
CHLORIDE SERPL-SCNC: 109 MMOL/L (ref 96–108)
CO2 SERPL-SCNC: 28 MMOL/L (ref 21–32)
CORTIS SERPL-MCNC: 12 UG/DL
CREAT SERPL-MCNC: 1.44 MG/DL (ref 0.6–1.3)
EOSINOPHIL # BLD AUTO: 0.2 THOUSAND/ÂΜL (ref 0–0.61)
EOSINOPHIL NFR BLD AUTO: 3 % (ref 0–6)
ERYTHROCYTE [DISTWIDTH] IN BLOOD BY AUTOMATED COUNT: 14.6 % (ref 11.6–15.1)
FLUAV RNA RESP QL NAA+PROBE: NEGATIVE
FLUBV RNA RESP QL NAA+PROBE: NEGATIVE
GFR SERPL CREATININE-BSD FRML MDRD: 50 ML/MIN/1.73SQ M
GLUCOSE SERPL-MCNC: 109 MG/DL (ref 65–140)
GLUCOSE SERPL-MCNC: 140 MG/DL (ref 65–140)
GLUCOSE SERPL-MCNC: 161 MG/DL (ref 65–140)
GLUCOSE SERPL-MCNC: 184 MG/DL (ref 65–140)
GLUCOSE SERPL-MCNC: 213 MG/DL (ref 65–140)
HCT VFR BLD AUTO: 32 % (ref 36.5–49.3)
HGB BLD-MCNC: 10.1 G/DL (ref 12–17)
IMM GRANULOCYTES # BLD AUTO: 0.05 THOUSAND/UL (ref 0–0.2)
IMM GRANULOCYTES NFR BLD AUTO: 1 % (ref 0–2)
LYMPHOCYTES # BLD AUTO: 1.29 THOUSANDS/ÂΜL (ref 0.6–4.47)
LYMPHOCYTES NFR BLD AUTO: 17 % (ref 14–44)
MCH RBC QN AUTO: 29 PG (ref 26.8–34.3)
MCHC RBC AUTO-ENTMCNC: 31.6 G/DL (ref 31.4–37.4)
MCV RBC AUTO: 92 FL (ref 82–98)
MONOCYTES # BLD AUTO: 1.11 THOUSAND/ÂΜL (ref 0.17–1.22)
MONOCYTES NFR BLD AUTO: 14 % (ref 4–12)
NEUTROPHILS # BLD AUTO: 5.16 THOUSANDS/ÂΜL (ref 1.85–7.62)
NEUTS SEG NFR BLD AUTO: 65 % (ref 43–75)
NRBC BLD AUTO-RTO: 0 /100 WBCS
PLATELET # BLD AUTO: 214 THOUSANDS/UL (ref 149–390)
PMV BLD AUTO: 11.6 FL (ref 8.9–12.7)
POTASSIUM SERPL-SCNC: 4.4 MMOL/L (ref 3.5–5.3)
PROT SERPL-MCNC: 7.1 G/DL (ref 6.4–8.4)
QRS AXIS: 37 DEGREES
QRSD INTERVAL: 60 MS
QT INTERVAL: 414 MS
QTC INTERVAL: 420 MS
RBC # BLD AUTO: 3.48 MILLION/UL (ref 3.88–5.62)
RSV RNA RESP QL NAA+PROBE: NEGATIVE
SARS-COV-2 RNA RESP QL NAA+PROBE: NEGATIVE
SODIUM SERPL-SCNC: 140 MMOL/L (ref 135–147)
T WAVE AXIS: -53 DEGREES
VENTRICULAR RATE: 62 BPM
WBC # BLD AUTO: 7.84 THOUSAND/UL (ref 4.31–10.16)

## 2022-11-28 RX ORDER — POTASSIUM CHLORIDE 750 MG/1
20 TABLET, EXTENDED RELEASE ORAL 2 TIMES DAILY
Status: DISCONTINUED | OUTPATIENT
Start: 2022-11-28 | End: 2022-12-08 | Stop reason: HOSPADM

## 2022-11-28 RX ORDER — SODIUM CHLORIDE 9 MG/ML
3 INJECTION INTRAVENOUS
Status: DISCONTINUED | OUTPATIENT
Start: 2022-11-28 | End: 2022-12-08 | Stop reason: HOSPADM

## 2022-11-28 RX ORDER — MIDODRINE HYDROCHLORIDE 5 MG/1
5 TABLET ORAL
Status: DISCONTINUED | OUTPATIENT
Start: 2022-11-28 | End: 2022-11-30

## 2022-11-28 RX ORDER — PEN NEEDLE, DIABETIC 32GX 5/32"
NEEDLE, DISPOSABLE MISCELLANEOUS
Qty: 100 EACH | Refills: 2 | Status: SHIPPED | OUTPATIENT
Start: 2022-11-28

## 2022-11-28 RX ORDER — ATORVASTATIN CALCIUM 10 MG/1
10 TABLET, FILM COATED ORAL DAILY
Status: DISCONTINUED | OUTPATIENT
Start: 2022-11-29 | End: 2022-12-08 | Stop reason: HOSPADM

## 2022-11-28 RX ORDER — ASPIRIN 81 MG/1
81 TABLET, CHEWABLE ORAL DAILY
Status: DISCONTINUED | OUTPATIENT
Start: 2022-11-29 | End: 2022-12-08 | Stop reason: HOSPADM

## 2022-11-28 RX ORDER — INSULIN LISPRO 100 [IU]/ML
1-5 INJECTION, SOLUTION INTRAVENOUS; SUBCUTANEOUS
Status: DISCONTINUED | OUTPATIENT
Start: 2022-11-28 | End: 2022-12-08 | Stop reason: HOSPADM

## 2022-11-28 RX ORDER — DOCUSATE SODIUM 100 MG/1
100 CAPSULE, LIQUID FILLED ORAL 2 TIMES DAILY
Status: DISCONTINUED | OUTPATIENT
Start: 2022-11-28 | End: 2022-12-08 | Stop reason: HOSPADM

## 2022-11-28 RX ORDER — ONDANSETRON 2 MG/ML
4 INJECTION INTRAMUSCULAR; INTRAVENOUS EVERY 6 HOURS PRN
Status: DISCONTINUED | OUTPATIENT
Start: 2022-11-28 | End: 2022-12-08 | Stop reason: HOSPADM

## 2022-11-28 RX ORDER — FLUDROCORTISONE ACETATE 0.1 MG/1
0.2 TABLET ORAL DAILY
Status: DISCONTINUED | OUTPATIENT
Start: 2022-11-29 | End: 2022-12-08 | Stop reason: HOSPADM

## 2022-11-28 RX ORDER — SENNOSIDES 8.6 MG
2 TABLET ORAL DAILY
Status: DISCONTINUED | OUTPATIENT
Start: 2022-11-29 | End: 2022-12-08 | Stop reason: HOSPADM

## 2022-11-28 RX ORDER — RISPERIDONE 1 MG/1
1 TABLET ORAL 2 TIMES DAILY
Status: DISCONTINUED | OUTPATIENT
Start: 2022-11-28 | End: 2022-12-08 | Stop reason: HOSPADM

## 2022-11-28 RX ORDER — INSULIN LISPRO 100 [IU]/ML
6 INJECTION, SOLUTION INTRAVENOUS; SUBCUTANEOUS
Status: DISCONTINUED | OUTPATIENT
Start: 2022-11-28 | End: 2022-11-30

## 2022-11-28 RX ORDER — INSULIN GLARGINE 100 [IU]/ML
14 INJECTION, SOLUTION SUBCUTANEOUS
Status: DISCONTINUED | OUTPATIENT
Start: 2022-11-28 | End: 2022-11-29

## 2022-11-28 RX ORDER — ACETAMINOPHEN 325 MG/1
650 TABLET ORAL EVERY 6 HOURS PRN
Status: DISCONTINUED | OUTPATIENT
Start: 2022-11-28 | End: 2022-12-08 | Stop reason: HOSPADM

## 2022-11-28 RX ORDER — CALCITRIOL 0.25 UG/1
0.25 CAPSULE, LIQUID FILLED ORAL DAILY
Status: DISCONTINUED | OUTPATIENT
Start: 2022-11-29 | End: 2022-12-08 | Stop reason: HOSPADM

## 2022-11-28 RX ORDER — SERTRALINE HYDROCHLORIDE 100 MG/1
100 TABLET, FILM COATED ORAL DAILY
Status: DISCONTINUED | OUTPATIENT
Start: 2022-11-29 | End: 2022-12-08 | Stop reason: HOSPADM

## 2022-11-28 RX ORDER — CALCIUM CARBONATE 200(500)MG
1000 TABLET,CHEWABLE ORAL DAILY PRN
Status: DISCONTINUED | OUTPATIENT
Start: 2022-11-28 | End: 2022-12-08 | Stop reason: HOSPADM

## 2022-11-28 RX ADMIN — HYDROCORTISONE SODIUM SUCCINATE 100 MG: 100 INJECTION, POWDER, FOR SOLUTION INTRAMUSCULAR; INTRAVENOUS at 23:04

## 2022-11-28 RX ADMIN — HYDROCORTISONE SODIUM SUCCINATE 100 MG: 100 INJECTION, POWDER, FOR SOLUTION INTRAMUSCULAR; INTRAVENOUS at 16:01

## 2022-11-28 RX ADMIN — DOCUSATE SODIUM 100 MG: 100 CAPSULE, LIQUID FILLED ORAL at 18:41

## 2022-11-28 RX ADMIN — Medication 12.5 MG: at 18:41

## 2022-11-28 RX ADMIN — RISPERIDONE 1 MG: 1 TABLET ORAL at 22:55

## 2022-11-28 RX ADMIN — INSULIN GLARGINE 14 UNITS: 100 INJECTION, SOLUTION SUBCUTANEOUS at 22:58

## 2022-11-28 RX ADMIN — POTASSIUM CHLORIDE 20 MEQ: 750 TABLET, EXTENDED RELEASE ORAL at 18:41

## 2022-11-28 RX ADMIN — MIDODRINE HYDROCHLORIDE 5 MG: 5 TABLET ORAL at 22:55

## 2022-11-28 RX ADMIN — SODIUM CHLORIDE 500 ML: 0.9 INJECTION, SOLUTION INTRAVENOUS at 16:01

## 2022-11-28 RX ADMIN — INSULIN LISPRO 6 UNITS: 100 INJECTION, SOLUTION INTRAVENOUS; SUBCUTANEOUS at 19:20

## 2022-11-28 RX ADMIN — INSULIN LISPRO 1 UNITS: 100 INJECTION, SOLUTION INTRAVENOUS; SUBCUTANEOUS at 19:19

## 2022-11-28 RX ADMIN — INSULIN LISPRO 2 UNITS: 100 INJECTION, SOLUTION INTRAVENOUS; SUBCUTANEOUS at 22:55

## 2022-11-28 RX ADMIN — APIXABAN 5 MG: 5 TABLET, FILM COATED ORAL at 22:55

## 2022-11-28 NOTE — H&P
[address] [facility]  [address] 1955, 74 y o  male MRN: [id number]  Unit/Bed#: ED 13 Encounter: [id number]  Primary Care Provider: Roger Maradiaga MD   Date and time admitted to hospital: 11/28/2022  3:27 PM    * Adrenal insufficiency   Assessment & Plan  · Unclear etiology of the inciting factor  He currently denies any symptoms  His daughter reports that he also has a mild productive cough  · Will check blood cultures, UA, and sputum culture  · Legs examined, chronic appearing wounds are present but do not appear acutely infected    · Continue hydrocortisone 100mg IV q8h until stable  · Consult endocrinology for assistance weaning down hydrocortisone    CVA (cerebral vascular accident) ([facility] [address] )  Assessment & Plan  · New subacute vs chronic lacunar cva identified since last month  · Possible due to ischemia from adrenal insufficiency as he has been compliant with his eliquis  · Continue aspirin and eliquis  · Check MRI brain  · Consult neurology    Obesity, morbid ([facility] [address] )  Assessment & Plan  · Affects all aspects of his care  · Weight loss counseling when stable as an outpatient    Orthostatic hypotension  Assessment & Plan  · Continue midodrine and florinef at home dose  · Monitor orthostatics    Psychiatric disorder  Assessment & Plan  · Continue risperidone and sertraline    Anemia of chronic disease  Assessment & Plan  · Hb is above his baseline  · Continue B12 supplementation    Paroxysmal atrial fibrillation   Assessment & Plan  · Continue metoprolol for rate control  · Continue Eliquis for [facility]    Insulin dependent type 1 diabetes mellitus   Assessment & Plan  Lab Results   Component Value Date    HGBA1C 7 5 (H) 07/22/2022       Recent Labs     11/28/22  1535 11/28/22  1724   POCGLU 184* 109       Blood Sugar Average: Last 72 hrs:  · (P) 146 5   · Diabetic diet  · fingersticks QID with sliding scale coverage  · Continue basal lantus and prandial insulin at home dose    VTE Pharmacologic Prophylaxis:   Moderate Risk (Score 3-4) - Pharmacological DVT Prophylaxis Ordered: apixaban (Eliquis)  Code Status: Level 1 - Full Code   Discussion with family: Updated  (daughter) at bedside  Anticipated Length of Stay: Patient will be admitted on an inpatient basis with an anticipated length of stay of greater than 2 midnights secondary to adrenal insufficiency  Total Time for Visit, including Counseling / Coordination of Care: 45 minutes Greater than 50% of this total time spent on direct patient counseling and coordination of care  Chief Complaint: hypotension    History of Present Illness:  Saulo Hong is a 74 y o  male with a PMH of adrenal insufficiency who presents with hypotension  For the past 3 days his blood pressures have been low and he has been having headaches and poor appetite  Glucose has been fairly stable, per his daughter  He denies any fever, sore throat, nasal congestions, chest pain, shortness of breath, nausea, vomiting, abdominal pain, diarrhea, dysuria  His daughter reports that he has a chronic productive cough  On his home health monitoring he was noted to be severely hypotensive and presented to the ED  Here he was treated with IVF and IV corticosteroids and stabilized  Review of Systems:  Review of Systems   All other systems reviewed and are negative  Past Medical and Surgical History:   Past Medical History:   Diagnosis Date   • Diabetes mellitus ([facility] [address] )        History reviewed  No pertinent surgical history  Meds/Allergies:  Prior to Admission medications    Medication Sig Start Date End Date Taking?  Authorizing Provider   apixaban (ELIQUIS) 5 mg Take 1 tablet (5 mg total) by mouth 2 (two) times a day 11/8/22   Myra Pettit MD   aspirin 81 mg chewable tablet Chew 81 mg daily    Historical Provider, MD   atorvastatin (LIPITOR) 10 mg tablet Take 1 tablet by mouth daily 7/12/22   Historical Provider, MD   calcitriol (ROCALTROL) 0 25 mcg capsule Take 1 capsule (0 25 mcg total) by mouth daily 11/8/22   Eldon Oviedo MD   cyanocobalamin (VITAMIN B-12) 100 mcg tablet Take by mouth daily 9/29/22   Historical Provider, MD   fludrocortisone (FLORINEF) 0 1 mg tablet Take 2 tablets (0 2 mg total) by mouth daily 11/8/22 11/8/23  Willirory Lama MD   hydrocortisone (CORTEF) 10 mg tablet Take 10 mg by mouth 2 (two) times a day 8/17/22   Historical Provider, MD   hydrocortisone (CORTEF) 20 mg tablet  9/29/22   Historical Provider, MD   Insulin Glargine Solostar (Lantus SoloStar) 100 UNIT/ML SOPN Inject 0 11 mL (11 Units total) under the skin daily at bedtime  Patient taking differently: Inject 14 Units under the skin daily at bedtime 11/14/22 12/14/22  Eldon Oviedo MD   insulin lispro (HumaLOG KwikPen) 100 units/mL injection pen Inject 6 Units under the skin 3 (three) times a day with meals  Patient taking differently: Inject 6 Units under the skin 3 (three) times a day with meals 7 with breakfast, 6 units with lunch and dinner  Plus sliding scale 10/26/22   Casi Mcdaniel MD   Insulin Pen Needle (BD Pen Needle Blessing 2nd Gen) 32G X 4 MM MISC For use with insulin pen  Pharmacy may dispense brand covered by insurance  Patient not taking: No sig reported 10/26/22   Casi Mcdaniel MD   Insulin Pen Needle (BD Pen Needle Blessing 2nd Gen) 32G X 4 MM MISC For use with insulin pen  Pharmacy may dispense brand covered by insurance  11/28/22   Eldon Oviedo MD   metoprolol tartrate (LOPRESSOR) 25 mg tablet Take 12 5 mg by mouth 2 (two) times a day 9/8/22   Historical Provider, MD   midodrine (PROAMATINE) 5 mg tablet Take 1 tablet (5 mg total) by mouth 3 (three) times a day as needed (sbp < 110   Otherwise hold the medication) 10/26/22 11/25/22  Casi Mcdaniel MD   NON FORMULARY Take 30 mcg by mouth daily at bedtime Rayaldee 30 mcg @ bedtime  Patient not taking: Reported on 11/14/2022    Historical Provider, MD   NON FORMULARY 1 Bottle if needed Relion Glucose Historical Provider, MD   potassium chloride (Klor-Con) 10 mEq tablet Take 20 mEq by mouth 2 (two) times a day 10/13/22 10/13/23  Historical Provider, MD   risperiDONE (RisperDAL) 1 mg tablet Take 1 tablet by mouth 2 (two) times a day    Historical Provider, MD   sertraline (ZOLOFT) 100 mg tablet Take 100 mg by mouth daily 3/16/22 3/16/23  Historical Provider, MD   Insulin Pen Needle (BD Pen Needle Blessing 2nd Gen) 32G X 4 MM MISC For use with insulin pen  Pharmacy may dispense brand covered by insurance  10/26/22 11/28/22  Mary Nash MD   risperiDONE (RisperDAL) 0 5 mg tablet Take 1 tablet by mouth 2 (two) times a day  Patient not taking: No sig reported 7/11/22 11/28/22  Historical Provider, MD   temazepam (RESTORIL) 30 mg capsule Take 30 mg by mouth daily as needed  Patient not taking: No sig reported  11/28/22  Historical Provider, MD   venlafaxine 150 MG TB24 24 hr tablet Take 1 tablet by mouth 2 (two) times a day  Patient not taking: No sig reported  11/28/22  Historical Provider, MD     I have reviewed home medications with patient family member  Allergies:    Allergies   Allergen Reactions   • Imipramine Other (See Comments)   • Lisinopril Other (See Comments)   • Paroxetine Other (See Comments)   • Penicillins Hives   • Rosiglitazone Other (See Comments)   • Troglitazone Other (See Comments)       Social History:  Marital Status: Single   Occupation: None  Patient Pre-hospital Living Situation: Home  Patient Pre-hospital Level of Mobility: walks with walker  Patient Pre-hospital Diet Restrictions: diabetic  Substance Use History:   Social History     Substance and Sexual Activity   Alcohol Use Not Currently   • Alcohol/week: 5 0 standard drinks   • Types: 5 Cans of beer per week    Comment: Quit in august     Social History     Tobacco Use   Smoking Status Former   • Years: 3 00   • Types: Cigarettes   Smokeless Tobacco Never     Social History     Substance and Sexual Activity   Drug Use Never Family History:  History reviewed  No pertinent family history  Physical Exam:     Vitals:   Blood Pressure: 154/69 (11/28/22 1745)  Pulse: 60 (11/28/22 1745)  Temperature: 97 9 °F (36 6 °C) (11/28/22 1524)  Temp Source: Oral (11/28/22 1524)  Respirations: 18 (11/28/22 1745)  SpO2: 100 % (11/28/22 1745)    Physical Exam  Constitutional:       General: He is not in acute distress  Appearance: Normal appearance  He is obese  He is not toxic-appearing  HENT:      Head: Normocephalic and atraumatic  Nose: Nose normal       Mouth/Throat:      Mouth: Mucous membranes are moist       Pharynx: Oropharynx is clear  No oropharyngeal exudate or posterior oropharyngeal erythema  Eyes:      General: No scleral icterus  Right eye: No discharge  Left eye: No discharge  Extraocular Movements: Extraocular movements intact  Pupils: Pupils are equal, round, and reactive to light  Cardiovascular:      Rate and Rhythm: Normal rate and regular rhythm  Pulmonary:      Effort: Pulmonary effort is normal       Breath sounds: No wheezing or rales  Abdominal:      General: There is no distension  Palpations: Abdomen is soft  Tenderness: There is no abdominal tenderness  Musculoskeletal:      Cervical back: Normal range of motion and neck supple  Right lower leg: No edema  Left lower leg: No edema  Comments: Right foot toes 2 and 4 amputated  Right foot hallux partial amputation  Bilateral posterior lower leg crusted linear deep tissue injuries   Skin:     General: Skin is warm and dry  Neurological:      General: No focal deficit present  Mental Status: He is alert and oriented to person, place, and time  Cranial Nerves: No cranial nerve deficit  Motor: No weakness     Psychiatric:         Mood and Affect: Mood normal          Behavior: Behavior normal           Additional Data:     Lab Results:  Results from last 7 days   Lab Units 11/28/22  0745 WBC Thousand/uL 7 84   HEMOGLOBIN g/dL 10 1*   HEMATOCRIT % 32 0*   PLATELETS Thousands/uL 214   NEUTROS PCT % 65   LYMPHS PCT % 17   MONOS PCT % 14*   EOS PCT % 3     Results from last 7 days   Lab Units 11/28/22  1557   SODIUM mmol/L 140   POTASSIUM mmol/L 4 4   CHLORIDE mmol/L 109*   CO2 mmol/L 28   BUN mg/dL 16   CREATININE mg/dL 1 44*   ANION GAP mmol/L 3*   CALCIUM mg/dL 9 3   ALBUMIN g/dL 3 6   TOTAL BILIRUBIN mg/dL 0 39   ALK PHOS U/L 77   ALT U/L 19   AST U/L 20   GLUCOSE RANDOM mg/dL 140         Results from last 7 days   Lab Units 11/28/22  1724 11/28/22  1535   POC GLUCOSE mg/dl 109 184*               Lines/Drains:  Invasive Devices     Peripheral Intravenous Line  Duration           Peripheral IV 11/28/22 Dorsal (posterior); Right Forearm <1 day                    Imaging: Reviewed radiology reports from this admission including: chest xray and CT head  X-ray chest 1 view portable   Final Result by Elizabeth Lopez MD (11/28 6677)      No acute cardiopulmonary disease  Workstation performed: [id number]         CT head without contrast   Final Result by Saud Dubon MD (11/28 8014)      No acute intracranial abnormality      Subacute or chronic lacunar infarct in right basal ganglia, new since 10/19/2022  Unchanged chronic lacunar infarct in the basal ganglia  The study was marked in [facility] for immediate notification  Workstation performed: [id number]         MRI inpatient order    (Results Pending)       EKG and Other Studies Reviewed on Admission:   · EKG: NSR  HR 62     ** Please Note: This note has been constructed using a voice recognition system   **

## 2022-11-28 NOTE — ASSESSMENT & PLAN NOTE
· New subacute vs chronic lacunar cva identified since last month  · Possible due to ischemia from adrenal insufficiency as he has been compliant with his eliquis  · Continue aspirin and eliquis  · Check MRI brain  · Consult neurology

## 2022-11-28 NOTE — ASSESSMENT & PLAN NOTE
· Unclear etiology of the inciting factor  He currently denies any symptoms  His daughter reports that he also has a mild productive cough  · Will check blood cultures, UA, and sputum culture  · Legs examined, chronic appearing wounds are present but do not appear acutely infected    · Continue hydrocortisone 100mg IV q8h until stable  · Consult endocrinology for assistance weaning down hydrocortisone

## 2022-11-28 NOTE — QUICK NOTE
Overnight Resident Note  Michelle Potter 74 y o  male MRN: [id number]  Unit/Bed#: ED 13 Encounter: [id number]        Patient seen and examined by the overnight resident for non-urgent consultation  Preliminary recommendations are as outlined below  Formal consultation to follow in the morning    Assessment and Plan:    CVA (cerebral vascular accident) [facility])  Assessment & Plan  This is a 74 y o  male with history of prior stroke, T1DM, HTN with orthostatic hypotension, paroxsymal A-fib on Eliquis, HFpEF, and prior MI who is currently admitted due to hypotension in the setting of adrenal insufficiency  On presentation to the ED he was also complaining of a headache and thus CTH was obtained and it revealed possible new subacute to chronic infarct in the right basal ganglia which is what prompted consult to Neurology  On personal review of imaging, comparing the most recent [address] to the two from 10/19/22, this infarct appears to be chronic and thus I respectfully disagree with radiologist interpretation  Also reviewed imaging reports from [facility] including MRI of brain from 8/21/22 and 9/15/22 and he appears to have chronic infarcts in both the left and right basal ganglia  Unfortunately I cannot personally review these films and we will need to request from [facility]  Based on the appearance of the chronic infarcts on CTH, these appear to be small vessel lacunar infarcts which are most likely due to his underlying vascular risk factors  Plan:  - As strokes appear to be at least subacute to even possibly chronic, no indication for permissive HTN  - Will ask day team to request access to images from [facility] for comparison  - Most recent HgbA1c was 7 5% in July  - Most recent lipid panel from 8/18/22 [facility]): Cholesterol 148, Tg 83, HDL 67, LDL 64  - Echo was most recently done on 8/17/22   No indication to update at this time  - Monitor on telemetry  - MRI brain wo contrast  - Evaluations by therapies  - Neurology will continue to follow    Paroxysmal atrial fibrillation   Assessment & Plan  Compliant with Eliquis        History:    Óscar Drummond is a 74 y o  male with history of prior strokes, T1DM, HTN, adrenal insufficiency with orthostatic hypotension, paroxsymal A-fib on Eliquis, HFpEF, RLS, depression, diabetic neuropathy, and prior MI  He presented to the ED for persistent hypotension at home  Neurology is consulted for possible subacute to chronic right basal ganglia infarction identified on CTH  To review, patient has a longstanding history of orthostatic hypotension and adrenal insufficiency  Previously followed at [facility] but recently transitioned care to [facility] after his most recent hospitalization  At home, he is currently on hydrocortisone and midodrine prn for hypotension  Over the last few days, he has had persistently low blood pressure readings  This morning his BP was in the 41'Q systolic which is what prompted his family to bring him into the ED today  On initial presentation to the ED, his BP was 77/54  His family reports that he has had increased headaches and slightly worse confusion over the last few days  He currently describes his headaches as generalized/all over, throbbing pain, and constant  He has had similar headaches in the past  No migrainous features  He is now reporting that the headache has resolved since he was admitted without medications  As part of his workup in the ED, patient had a CT head which identified chronic infarct in the left basal ganglia and subacute to chronic infarct in the right basal ganglia, thus neurology was consulted  He denies any recent focal weakness, sensory changes, vision changes  No changes to speech or swallowing  He admits to having lightheadedness upon standing but no dizziness or vertiginous symptoms  Denies chest pain, SOB, abdominal pain, n/v/d  Daughter, Abdiaziz Lam, also mentions that her father has had a significant weight loss over the last few weeks   Reviewed encounters through [facility] remote patient monitoring nurses -- weight was 202 2 lbs on 11/15 and today (11/28) it is now down to 181 8 lbs  This is an unintentional weight loss  He has been eating well  She cooks 2-3 meals per day for him and his girlfriend or he will prepare dinner  Feels that his appetite is good  No significant changes to his diet  Additionally he has history of T1DM and has had multiple ER visits and hospitalizations over the last 3 months for both hypoglycemia and hyperglycemia  Most recent admission was at [place] [address]  from 10/19 to 10/26  During his hospital stay had a rapid response called for hypoglycemia and unresponsiveness  His iSTAT glucose was critically low at < 20  He required intubation and brief ICU stay for the management of his hypoglycemia  Was recently seen in outpatient Endocrinology clinic on 11/14/22 by Dr Roseline Zhao  His basal bolus and correctional insulin was updated at that appointment  He was advised to restart Dexcom sensor as soon as possible  He does not feel comfortable to use an insulin pump  They contacted the Endocrinologist office on 11/22/22 due to persistently high glucose readings and adjustments were made to his regimen  Since then his glucose readings have been much improved  Review of Outside Records:  - Had routine EEG at [address] on 9/16/22 due to staring spells  Interpretation: "This electroencephalogram is within normal limits in the awake and drowsy state(s), as well as during photic stimulation and hyperventilation  "  - MRI brain wo contrast at [address] on 9/17/22: 1  No acute infarction  2  No mass  3  Chronic ischemic changes in cerebral white noted  4  Atrophy "  - MRI brain wo contrast at [address] on 8/21/22: "There are small acute infarction involving the bilateral basal ganglia and paraventricular corona radiata, more prominent on the left  There is hemorrhage associated with the left corona radiata infarction   Small focus of gradient susceptibility at the right corona radiata may also represent a small area of hemorrhage  "  - Transthoracic echo at [facility] on 8/17/22:   •  Left Ventricle: Left ventricle is normal in size  Systolic function is normal with an ejection fraction of 55-60%  There is grade I (mild) diastolic dysfunction  •  Right Ventricle: Systolic function is normal    •  Aortic Valve: The aortic valve is trileaflet  The leaflets are mildly thickened  The leaflets are mildly calcified  There is mild regurgitation  •  Mitral Valve: There is annular calcification  There is trace regurgitation  •  Tricuspid Valve: There is trace regurgitation  •  Pulmonic Valve: There is trace regurgitation  PA pressure not calculated due to incomplete TR signal        Physical Exam:  /71 (BP Location: Left arm)   Pulse 64   Temp 97 9 °F (36 6 °C) (Oral)   Resp 16   SpO2 100%    General Examination: Awake and alert  Appears chronically ill  Resting comfortably and eating dinner  HEENT: NC/AT  CV: Well perfused  Resp: Unlabored  Extremities: No edema  Skin: warm and dry  Psych: Normal mood and affect      Neurological Examination:   Mental Status: Awake, alert, oriented to person, place, month, and year  Can name and repeat  Responses are mildly delayed  CN: PERRL, EOMI, hearing intact, facial sensation intact, no facial droop, palate elevates symmetrically, equal shoulder shrug and head turn  Motor: Strength 5/5 in all four extremities, no pronator drift    Sensory: Intact to light touch in all four extremities   Coordination: FNF normal bilaterally  Reflexes: 1+ and symmetric throughout with the exception of achilles reflexes which are absent bilaterally  Gait: not tested    ======    Thank you for allowing me to participate in the care of your patient, Silvia 226DO [facility] Neurology Residency, PGY-3

## 2022-11-28 NOTE — ASSESSMENT & PLAN NOTE
Lab Results   Component Value Date    HGBA1C 7 5 (H) 07/22/2022       Recent Labs     11/28/22  1535 11/28/22  1724   POCGLU 184* 109       Blood Sugar Average: Last 72 hrs:  · (P) 146 5   · Diabetic diet  · fingersticks QID with sliding scale coverage  · Continue basal lantus and prandial insulin at home dose

## 2022-11-28 NOTE — ED PROVIDER NOTES
History  Chief Complaint   Patient presents with   • Hypotension     Per family, pt's remote monitoring registered BP 70/46, repeat bp around lunch was lower; pt reports headache and feeling tired     Patient is a 72-year-old male with a significant past medical history of stroke anticoagulated on Eliquis, diabetes, adrenal insufficiency, type 2 MI, currently presenting with a chief complaint of hypotension  He presents with his daughter who is bedside and assists in the history  As per daughter, the patient has had several days of low blood pressure readings measured at home  He has a virtual health care assistance that monitors his blood pressure readings and because he has been notably hypotensive over last several days, worsening today, they recommended he come to the emergency department  He had blood pressure readings in the 70 systolic earlier today  Daughter states that he has been complaining of a headache, and also seems slightly more confused than normal   He describes headache as a constant, generalized, throbbing sensation  He has not had any visual changes  He denies any changes in sensation or focal weakness  He denies any fall or headstrike  He has not had any recent fevers, chills, or recent illness  He has not been bleeding from anywhere  His stools have been nonbloody/not melanotic  He has been eating and drinking normally, however the daughter does note that he has lost approximately 20 lb over last several months, unintentionally  He denies any chest pain or difficulty breathing  The daughter notes that he does have hydrocortisone as well as midodrine at home for hypotension  He only takes the hydrocortisone and midodrine as needed  He took hydrocortisone last week, but none since, he also took his midodrine 2 times today, most recently about 3 hours ago  Prior to Admission Medications   Prescriptions Last Dose Informant Patient Reported? Taking?    Insulin Glargine Solostar (Lantus SoloStar) 100 UNIT/ML SOPN   No No   Sig: Inject 0 11 mL (11 Units total) under the skin daily at bedtime   Patient taking differently: Inject 14 Units under the skin daily at bedtime   Insulin Pen Needle (BD Pen Needle Blessing 2nd Gen) 32G X 4 MM MISC   No No   Sig: For use with insulin pen  Pharmacy may dispense brand covered by insurance  Patient not taking: No sig reported   Insulin Pen Needle (BD Pen Needle Blessing 2nd Gen) 32G X 4 MM MISC   No No   Sig: For use with insulin pen  Pharmacy may dispense brand covered by insurance  NON FORMULARY   Yes No   Sig: Take 30 mcg by mouth daily at bedtime Rayaldee 30 mcg @ bedtime   Patient not taking: Reported on 2022   NON FORMULARY   Yes No   Si Bottle if needed Relion Glucose   apixaban (ELIQUIS) 5 mg   No No   Sig: Take 1 tablet (5 mg total) by mouth 2 (two) times a day   aspirin 81 mg chewable tablet   Yes No   Sig: Chew 81 mg daily   atorvastatin (LIPITOR) 10 mg tablet   Yes No   Sig: Take 1 tablet by mouth daily   calcitriol (ROCALTROL) 0 25 mcg capsule   No No   Sig: Take 1 capsule (0 25 mcg total) by mouth daily   cyanocobalamin (VITAMIN B-12) 100 mcg tablet   Yes No   Sig: Take by mouth daily   fludrocortisone (FLORINEF) 0 1 mg tablet   No No   Sig: Take 2 tablets (0 2 mg total) by mouth daily   hydrocortisone (CORTEF) 10 mg tablet   Yes No   Sig: Take 10 mg by mouth 2 (two) times a day   hydrocortisone (CORTEF) 20 mg tablet   Yes No   insulin lispro (HumaLOG KwikPen) 100 units/mL injection pen   No No   Sig: Inject 6 Units under the skin 3 (three) times a day with meals   Patient taking differently: Inject 6 Units under the skin 3 (three) times a day with meals 7 with breakfast, 6 units with lunch and dinner   Plus sliding scale   metoprolol tartrate (LOPRESSOR) 25 mg tablet   Yes No   Sig: Take 12 5 mg by mouth 2 (two) times a day   midodrine (PROAMATINE) 5 mg tablet   No No   Sig: Take 1 tablet (5 mg total) by mouth 3 (three) times a day as needed (sbp < 110  Otherwise hold the medication)   potassium chloride (Klor-Con) 10 mEq tablet   Yes No   Sig: Take 20 mEq by mouth 2 (two) times a day   risperiDONE (RisperDAL) 1 mg tablet   Yes No   Sig: Take 1 tablet by mouth 2 (two) times a day   sertraline (ZOLOFT) 100 mg tablet   Yes No   Sig: Take 100 mg by mouth daily      Facility-Administered Medications: None       Past Medical History:   Diagnosis Date   • Diabetes mellitus ([facility] [address] )        History reviewed  No pertinent surgical history  History reviewed  No pertinent family history  I have reviewed and agree with the history as documented  E-Cigarette/Vaping   • E-Cigarette Use Never User      E-Cigarette/Vaping Substances     Social History     Tobacco Use   • Smoking status: Former     Packs/day: 0 25     Years: 30 00     Pack years: 7 50     Types: Cigarettes   • Smokeless tobacco: Never   Vaping Use   • Vaping Use: Never used   Substance Use Topics   • Alcohol use: Not Currently     Alcohol/week: 5 0 standard drinks     Types: 5 Cans of beer per week     Comment: Quit in august   • Drug use: Never        Review of Systems   Constitutional: Positive for unexpected weight change  Negative for chills and fever  HENT: Negative for sore throat  Eyes: Negative for visual disturbance  Respiratory: Negative for cough and shortness of breath  Cardiovascular: Negative for chest pain and leg swelling  Gastrointestinal: Negative for abdominal pain, constipation, diarrhea, nausea and vomiting  Genitourinary: Negative for dysuria  Musculoskeletal: Negative for back pain and neck pain  Skin: Negative for rash  Neurological: Positive for headaches  Negative for dizziness, syncope and light-headedness  Psychiatric/Behavioral: Negative for agitation  All other systems reviewed and are negative        Physical Exam  ED Triage Vitals   Temperature Pulse Respirations Blood Pressure SpO2   11/28/22 1524 11/28/22 1524 11/28/22 1524 11/28/22 1524 11/28/22 1524   97 9 °F (36 6 °C) 56 18 (!) 77/54 100 %      Temp Source Heart Rate Source Patient Position - Orthostatic VS BP Location FiO2 (%)   11/28/22 1524 11/28/22 1524 11/28/22 1524 11/28/22 1524 --   Oral Monitor Sitting Left arm       Pain Score       11/29/22 0344       No Pain             Orthostatic Vital Signs  Vitals:    11/28/22 2345 11/29/22 0145 11/29/22 0342 11/29/22 0718   BP: 138/64 164/74 (!) 173/92 150/93   Pulse: 74 68 79 86   Patient Position - Orthostatic VS: Lying Lying         Physical Exam  Vitals and nursing note reviewed  Constitutional:       General: He is not in acute distress  Appearance: Normal appearance  He is obese  He is ill-appearing  HENT:      Head: Normocephalic and atraumatic  Right Ear: External ear normal       Left Ear: External ear normal       Nose: Nose normal       Mouth/Throat:      Mouth: Mucous membranes are moist    Eyes:      General: No visual field deficit or scleral icterus  Right eye: No discharge  Left eye: No discharge  Extraocular Movements: Extraocular movements intact  Conjunctiva/sclera: Conjunctivae normal       Pupils: Pupils are equal, round, and reactive to light  Cardiovascular:      Rate and Rhythm: Normal rate and regular rhythm  Pulses: Normal pulses  Heart sounds: Normal heart sounds  No murmur heard  No friction rub  No gallop  Pulmonary:      Effort: Pulmonary effort is normal  No respiratory distress  Breath sounds: Normal breath sounds  No wheezing, rhonchi or rales  Abdominal:      General: Abdomen is flat  There is no distension  Palpations: Abdomen is soft  There is no mass  Tenderness: There is no abdominal tenderness  Genitourinary:     Comments: Deferred  Musculoskeletal:         General: Normal range of motion  Cervical back: Normal range of motion  No rigidity or tenderness  Right lower leg: No edema  Left lower leg: No edema     Skin: General: Skin is warm and dry  Neurological:      General: No focal deficit present  Mental Status: He is alert and oriented to person, place, and time  GCS: GCS eye subscore is 4  GCS verbal subscore is 5  GCS motor subscore is 6  Cranial Nerves: Cranial nerves are intact  No cranial nerve deficit, dysarthria or facial asymmetry  Sensory: Sensation is intact  No sensory deficit  Motor: Motor function is intact  No pronator drift  Coordination: Coordination is intact  Finger[facility]Finger Test and [facility] Test normal       Comments: Bilateral LE strength 4+/5, which is baseline as per daughter     Psychiatric:         Mood and Affect: Mood normal          ED Medications  Medications   sodium chloride (PF) 0 9 % injection 3 mL (has no administration in time range)   apixaban (ELIQUIS) tablet 5 mg (5 mg Oral Given 11/29/22 0851)   aspirin chewable tablet 81 mg (81 mg Oral Given 11/29/22 0851)   atorvastatin (LIPITOR) tablet 10 mg (10 mg Oral Given 11/29/22 0851)   calcitriol (ROCALTROL) capsule 0 25 mcg (0 25 mcg Oral Given 11/29/22 0851)   cyanocobalamin (VITAMIN B-12) tablet 100 mcg (100 mcg Oral Given 11/29/22 0851)   fludrocortisone (FLORINEF) tablet 0 2 mg (0 2 mg Oral Given 11/29/22 0853)   insulin glargine (LANTUS) subcutaneous injection 14 Units 0 14 mL (14 Units Subcutaneous Given 11/28/22 2258)   insulin lispro (HumaLOG) 100 units/mL subcutaneous injection 6 Units (6 Units Subcutaneous Given 11/28/22 1920)   midodrine (PROAMATINE) tablet 5 mg (5 mg Oral Given 11/28/22 2255)   metoprolol tartrate (LOPRESSOR) partial tablet 12 5 mg (12 5 mg Oral Given 11/29/22 0851)   potassium chloride (K-DUR,KLOR-CON) CR tablet 20 mEq (20 mEq Oral Given 11/29/22 0851)   risperiDONE (RisperDAL) tablet 1 mg (1 mg Oral Given 11/29/22 0852)   sertraline (ZOLOFT) tablet 100 mg (100 mg Oral Given 11/29/22 0851)   acetaminophen (TYLENOL) tablet 650 mg (has no administration in time range) docusate sodium (COLACE) capsule 100 mg (100 mg Oral Given 11/29/22 0852)   senna (SENOKOT) tablet 17 2 mg (17 2 mg Oral Given 11/29/22 0851)   ondansetron (ZOFRAN) injection 4 mg (has no administration in time range)   calcium carbonate (TUMS) chewable tablet 1,000 mg (has no administration in time range)   insulin lispro (HumaLOG) 100 units/mL subcutaneous injection 1-5 Units (1 Units Subcutaneous Given 11/28/22 1919)   insulin lispro (HumaLOG) 100 units/mL subcutaneous injection 1-5 Units (2 Units Subcutaneous Given 11/28/22 2255)   hydrocortisone (Solu-CORTEF) injection 100 mg (100 mg Intravenous Given 11/29/22 0851)   sodium chloride 0 9 % bolus 500 mL (0 mL Intravenous Stopped 11/28/22 1819)   hydrocortisone (Solu-CORTEF) injection 100 mg (100 mg Intravenous Given 11/28/22 1601)       Diagnostic Studies  Results Reviewed     Procedure Component Value Units Date/Time    Procalcitonin [[id number]]  (Normal) Resulted: 11/29/22 0625    Lab Status: Final result Specimen: Blood Updated: 11/29/22 0625     Procalcitonin <0 05 ng/ml     Basic metabolic panel [[id number]]  (Abnormal) Collected: 11/29/22    Lab Status: Final result Specimen: Blood Updated: 11/29/22 0459     Sodium 136 mmol/L      Potassium 4 4 mmol/L      Chloride 107 mmol/L      CO2 23 mmol/L      ANION GAP 6 mmol/L      BUN 19 mg/dL      Creatinine 1 11 mg/dL      Glucose 250 mg/dL      Calcium 9 0 mg/dL      eGFR 69 ml/min/1 73sq m     Narrative:      [place] guidelines for Chronic Kidney Disease (CKD):   •  Stage 1 with normal or high GFR (GFR > 90 mL/min/1 73 square meters)  •  Stage 2 Mild CKD (GFR = 60-89 mL/min/1 73 square meters)  •  Stage 3A Moderate CKD (GFR = 45-59 mL/min/1 73 square meters)  •  Stage 3B Moderate CKD (GFR = 30-44 mL/min/1 73 square meters)  •  Stage 4 Severe CKD (GFR = 15-29 mL/min/1 73 square meters)  •  Stage 5 End Stage CKD (GFR <15 mL/min/1 73 square meters)  Note: GFR calculation is accurate only with a steady state creatinine    CBC (With Platelets) [[id number]]  (Abnormal) Resulted: 11/29/22 0447    Lab Status: Final result Specimen: Blood Updated: 11/29/22 0447     WBC 7 64 Thousand/uL      RBC 3 72 Million/uL      Hemoglobin 10 8 g/dL      Hematocrit 36 1 %      MCV 97 fL      MCH 29 0 pg      MCHC 29 9 g/dL      RDW 14 5 %      Platelets 556 Thousands/uL      MPV 11 7 fL     Urine Microscopic [[id number]]  (Abnormal) Collected: 11/29/22 0318    Lab Status: Final result Specimen: Urine, Clean Catch Updated: 11/29/22 0443     RBC, UA 4-10 /hpf      WBC, UA 2-4 /hpf      Epithelial Cells None Seen /hpf      Bacteria, UA Occasional /hpf      Hyaline Casts, UA 5-10 /lpf      Amorphous Crystals, UA Occasional     Ca Oxalate Pura, UA Moderate /hpf     UA w Reflex to Microscopic w Reflex to Culture [[id number]]  (Abnormal) Collected: 11/29/22 0318    Lab Status: Final result Specimen: Urine, Clean Catch Updated: 11/29/22 0438     Color, UA Yellow     Clarity, UA Clear     Specific Gravity, UA 1 024     pH, UA 6 0     Leukocytes, UA Negative     Nitrite, UA Negative     Protein, UA Trace mg/dl      Glucose, UA Trace mg/dl      Ketones, UA Negative mg/dl      Urobilinogen, UA 2 0 mg/dl      Bilirubin, UA Negative     Occult Blood, UA Negative    Blood culture [[id number]] Collected: 11/28/22 1911    Lab Status: Preliminary result Specimen: Blood from Arm, Left Updated: 11/29/22 0002     Blood Culture Received in Microbiology Lab  Culture in Progress  Blood culture [[id number]] Collected: 11/28/22 1549    Lab Status: Preliminary result Specimen: Blood from Arm, Right Updated: 11/29/22 0002     Blood Culture Received in Microbiology Lab  Culture in Progress      Fingerstick Glucose (POCT) [[id number]]  (Abnormal) Collected: 11/28/22 2254    Lab Status: Final result Updated: 11/28/22 2255     POC Glucose 213 mg/dl     HS Troponin I 4hr [[id number]]  (Normal) Collected: 11/28/22 2136    Lab Status: Final result Specimen: Blood from Arm, Right Updated: 11/28/22 2215     hs TnI 4hr 7 ng/L      Delta 4hr hsTnI -3 ng/L     Fingerstick Glucose (POCT) [[id number]]  (Abnormal) Collected: 11/28/22 1918    Lab Status: Final result Updated: 11/28/22 1923     POC Glucose 161 mg/dl     HS Troponin I 2hr [[id number]]  (Normal) Collected: 11/28/22 1757    Lab Status: Final result Specimen: Blood from Arm, Right Updated: 11/28/22 1846     hs TnI 2hr 8 ng/L      Delta 2hr hsTnI -2 ng/L     Sputum culture and Gram stain [[id number]]     Lab Status: No result Specimen: Sputum     Fingerstick Glucose (POCT) [[id number]]  (Normal) Collected: 11/28/22 1724    Lab Status: Final result Updated: 11/28/22 1726     POC Glucose 109 mg/dl     FLU/RSV/COVID - if FLU/RSV clinically relevant [[id number]]  (Normal) Collected: 11/28/22 1557    Lab Status: Final result Specimen: Nares from Nose Updated: 11/28/22 1702     SARS-CoV-2 Negative     INFLUENZA A PCR Negative     INFLUENZA B PCR Negative     RSV PCR Negative    Narrative:      FOR PEDIATRIC PATIENTS - copy/paste COVID Guidelines URL to browser: [email or web address]    SARS-CoV-2 assay is a Nucleic Acid Amplification assay intended for the  qualitative detection of nucleic acid from SARS-CoV-2 in nasopharyngeal  swabs  Results are for the presumptive identification of SARS-CoV-2 RNA  Positive results are indicative of infection with SARS-CoV-2, the virus  causing COVID-19, but do not rule out bacterial infection or co-infection  with other viruses  Laboratories within the [place] and its  territories are required to report all positive results to the appropriate  public health authorities  Negative results do not preclude SARS-CoV-2  infection and should not be used as the sole basis for treatment or other  patient management decisions   Negative results must be combined with  clinical observations, patient history, and epidemiological information  This test has not been FDA cleared or approved  This test has been authorized by FDA under an Emergency Use Authorization  (EUA)  This test is only authorized for the duration of time the  declaration that circumstances exist justifying the authorization of the  emergency use of an in vitro diagnostic tests for detection of SARS-CoV-2  virus and/or diagnosis of COVID-19 infection under section 564(b)(1) of  the Act, 21 U  [place]  629CIK-6(A)(4), unless the authorization is terminated  or revoked sooner  The test has been validated but independent review by FDA  and CLIA is pending  Test performed using [employer] GeneXpert: This RT-PCR assay targets N2,  a region unique to SARS-CoV-2  A conserved region in the E-gene was chosen  for pan-Sarbecovirus detection which includes SARS-CoV-2  According to CMS-2020-01-R, this platform meets the definition of high-throughput technology      HS Troponin 0hr (reflex protocol) [[id number]]  (Normal) Collected: 11/28/22 1557    Lab Status: Final result Specimen: Blood from Arm, Right Updated: 11/28/22 1647     hs TnI 0hr 10 ng/L     Cortisol [[id number]]  (Normal) Collected: 11/28/22 1557    Lab Status: Final result Specimen: Blood from Arm, Right Updated: 11/28/22 1646     Cortisol, Random 12 0 ug/dL     Comprehensive metabolic panel [[id number]]  (Abnormal) Collected: 11/28/22 1557    Lab Status: Final result Specimen: Blood from Arm, Right Updated: 11/28/22 1635     Sodium 140 mmol/L      Potassium 4 4 mmol/L      Chloride 109 mmol/L      CO2 28 mmol/L      ANION GAP 3 mmol/L      BUN 16 mg/dL      Creatinine 1 44 mg/dL      Glucose 140 mg/dL      Calcium 9 3 mg/dL      AST 20 U/L      ALT 19 U/L      Alkaline Phosphatase 77 U/L      Total Protein 7 1 g/dL      Albumin 3 6 g/dL      Total Bilirubin 0 39 mg/dL      eGFR 50 ml/min/1 73sq m     Narrative:      [place] guidelines for Chronic Kidney Disease (CKD):   •  Stage 1 with normal or high GFR (GFR > 90 mL/min/1 73 square meters)  •  Stage 2 Mild CKD (GFR = 60-89 mL/min/1 73 square meters)  •  Stage 3A Moderate CKD (GFR = 45-59 mL/min/1 73 square meters)  •  Stage 3B Moderate CKD (GFR = 30-44 mL/min/1 73 square meters)  •  Stage 4 Severe CKD (GFR = 15-29 mL/min/1 73 square meters)  •  Stage 5 End Stage CKD (GFR <15 mL/min/1 73 square meters)  Note: GFR calculation is accurate only with a steady state creatinine    CBC and differential [[id number]]  (Abnormal) Collected: 11/28/22 1557    Lab Status: Final result Specimen: Blood from Arm, Right Updated: 11/28/22 1617     WBC 7 84 Thousand/uL      RBC 3 48 Million/uL      Hemoglobin 10 1 g/dL      Hematocrit 32 0 %      MCV 92 fL      MCH 29 0 pg      MCHC 31 6 g/dL      RDW 14 6 %      MPV 11 6 fL      Platelets 955 Thousands/uL      nRBC 0 /100 WBCs      Neutrophils Relative 65 %      Immat GRANS % 1 %      Lymphocytes Relative 17 %      Monocytes Relative 14 %      Eosinophils Relative 3 %      Basophils Relative 0 %      Neutrophils Absolute 5 16 Thousands/µL      Immature Grans Absolute 0 05 Thousand/uL      Lymphocytes Absolute 1 29 Thousands/µL      Monocytes Absolute 1 11 Thousand/µL      Eosinophils Absolute 0 20 Thousand/µL      Basophils Absolute 0 03 Thousands/µL     Fingerstick Glucose (POCT) [[id number]]  (Abnormal) Collected: 11/28/22 1535    Lab Status: Final result Updated: 11/28/22 1536     POC Glucose 184 mg/dl                  MRI brain wo contrast   Final Result by Maria Luisa Potts MD (11/28 4093)         1  No MR evidence of acute ischemia  2  Volume loss/atrophy appears advanced for the patient's age  3  Old left lacunar infarct  Workstation performed: [id number]         X-ray chest 1 view portable   Final Result by Yoel Talamantes MD (11/28 6177)      No acute cardiopulmonary disease                    Workstation performed: [id number]         CT head without contrast   Final Result by Sara Pichardo MD (11/28 1658)      No acute intracranial abnormality      Subacute or chronic lacunar infarct in right basal ganglia, new since 10/19/2022  Unchanged chronic lacunar infarct in the basal ganglia  The study was marked in [facility] for immediate notification  Workstation performed: [id number]               Procedures  ECG 12 Lead Documentation Only    Date/Time: 11/29/2022 4:50 PM  Performed by: Chas Christianson DO  Authorized by: Chas Christianson DO     Indications / Diagnosis:  Hypotension  ECG reviewed by me, the ED Provider: yes    Patient location:  ED  Previous ECG:     Previous ECG:  Compared to current    Similarity:  No change  Interpretation:     Interpretation: normal    Rate:     ECG rate:  62    ECG rate assessment: normal    Rhythm:     Rhythm: sinus rhythm    Ectopy:     Ectopy: none    QRS:     QRS axis:  Normal  Conduction:     Conduction: normal    ST segments:     ST segments:  Normal  T waves:     T waves: normal            ED Course  ED Course as of 11/29/22 0854   Mon Nov 28, 2022   1701 CT head without contrast  Subacute or chronic lacunar infarct in right basal ganglia, new since 10/19/2022  SBIRT 22yo+    Flowsheet Row Most Recent Value   SBIRT (25 yo +)    In order to provide better care to our patients, we are screening all of our patients for alcohol and drug use  Would it be okay to ask you these screening questions? Unable to answer at this time Filed at: 11/28/2022 1531                MDM  Number of Diagnoses or Management Options  Basal ganglia infarction [facility]): new and requires workup  Hypotension: new and requires workup  Diagnosis management comments: Patient is a 74year old male presenting with a chief complaint of hypotension  Based on history and evaluation, suspect adrenal insufficiency   Differential also includes infectious although less likely as patient does not have a clear source, nor does he have clinical symptoms of infectious etiology, differential also includes hemorrhagic or hypovolemic, although doubt given no reports of bleeding and patient does not appear clinically dehydrated or volume down  Given headache, concern for acute intracranial pathology versus benign tension/migraine headache  Plan: labs, cortisol level, stress dose steroid, fluids, CTH    Labs largely unremarkable  BP improved with fluids and steroids  CTH notable for basal ganglia infarction, subacute versus chronic but new from previous [address] approximately 1 month prior  Recommend admission for hypotension on presentation as well as new CT findings  Patient seems to understand this plan and is agreeable  All questions answered  Patient admitted  Amount and/or Complexity of Data Reviewed  Clinical lab tests: ordered and reviewed  Tests in the radiology section of CPT®: ordered and reviewed  Review and summarize past medical records: yes  Discuss the patient with other providers: yes  Independent visualization of images, tracings, or specimens: yes    Patient Progress  Patient progress: stable      Disposition  Final diagnoses:   Hypotension   Basal ganglia infarction ([facility] [address] )     Time reflects when diagnosis was documented in both MDM as applicable and the Disposition within this note     Time User Action Codes Description Comment    11/28/2022  5:36 PM Devoria  [I95 9] Hypotension     11/28/2022  5:37 PM Valerie Roles Add [I63 81] Basal ganglia infarction ([facility] [address] )     11/28/2022  6:15 PM Delmas Core Add [E27 40] Adrenal insufficiency        ED Disposition     ED Disposition   Admit    Condition   Stable    Date/Time   Mon Nov 28, 2022  5:37 PM    Comment   Case was discussed with CASSANDRA and the patient's admission status was agreed to be Admission Status: inpatient status to the service of Dr Sri Miramontes             Follow-up Information    None         Current Discharge Medication List      CONTINUE these medications which have NOT CHANGED Details   apixaban (ELIQUIS) 5 mg Take 1 tablet (5 mg total) by mouth 2 (two) times a day  Qty: 60 tablet, Refills: 2    Associated Diagnoses: Type 2 MI (myocardial infarction) (HCC)      aspirin 81 mg chewable tablet Chew 81 mg daily      atorvastatin (LIPITOR) 10 mg tablet Take 1 tablet by mouth daily      calcitriol (ROCALTROL) 0 25 mcg capsule Take 1 capsule (0 25 mcg total) by mouth daily  Qty: 30 capsule, Refills: 1    Associated Diagnoses: Stage 5 chronic kidney disease not on chronic dialysis (HCC)      cyanocobalamin (VITAMIN B-12) 100 mcg tablet Take by mouth daily      fludrocortisone (FLORINEF) 0 1 mg tablet Take 2 tablets (0 2 mg total) by mouth daily  Qty: 60 tablet, Refills: 5    Associated Diagnoses: [facility]      ! ! hydrocortisone (CORTEF) 10 mg tablet Take 10 mg by mouth 2 (two) times a day      !! hydrocortisone (CORTEF) 20 mg tablet       Insulin Glargine Solostar (Lantus SoloStar) 100 UNIT/ML SOPN Inject 0 11 mL (11 Units total) under the skin daily at bedtime  Qty: 15 mL, Refills: 0    Associated Diagnoses: Insulin dependent type 1 diabetes mellitus (HCC)      insulin lispro (HumaLOG KwikPen) 100 units/mL injection pen Inject 6 Units under the skin 3 (three) times a day with meals  Qty: 15 mL, Refills: 0    Associated Diagnoses: Insulin dependent type 1 diabetes mellitus ([facility] )      ! ! Insulin Pen Needle (BD Pen Needle Blessing 2nd Gen) 32G X 4 MM MISC For use with insulin pen  Pharmacy may dispense brand covered by insurance  Qty: 100 each, Refills: 0    Associated Diagnoses: Insulin dependent type 1 diabetes mellitus ([facility] )      ! ! Insulin Pen Needle (BD Pen Needle Blessing 2nd Gen) 32G X 4 MM MISC For use with insulin pen  Pharmacy may dispense brand covered by insurance    Qty: 100 each, Refills: 2    Associated Diagnoses: Insulin dependent type 1 diabetes mellitus (HCC)      metoprolol tartrate (LOPRESSOR) 25 mg tablet Take 12 5 mg by mouth 2 (two) times a day      midodrine (PROAMATINE) 5 mg tablet Take 1 tablet (5 mg total) by mouth 3 (three) times a day as needed (sbp < 110  Otherwise hold the medication)  Qty: 30 tablet, Refills: 0    Associated Diagnoses: Essential hypertension      !! NON FORMULARY Take 30 mcg by mouth daily at bedtime Rayaldee 30 mcg @ bedtime      !! NON FORMULARY 1 Bottle if needed Relion Glucose      potassium chloride (Klor-Con) 10 mEq tablet Take 20 mEq by mouth 2 (two) times a day      risperiDONE (RisperDAL) 1 mg tablet Take 1 tablet by mouth 2 (two) times a day      sertraline (ZOLOFT) 100 mg tablet Take 100 mg by mouth daily       ! ! - Potential duplicate medications found  Please discuss with provider  No discharge procedures on file  PDMP Review     None           ED Provider  Attending physically available and evaluated Michelle Potter I managed the patient along with the ED Attending      Electronically Signed by         Jack Garrett, [address] [place] [address]  11/29/22 4986

## 2022-11-29 PROBLEM — R63.4 UNINTENTIONAL WEIGHT LOSS: Status: ACTIVE | Noted: 2022-11-29

## 2022-11-29 LAB
AMORPH URATE CRY URNS QL MICRO: ABNORMAL
ANION GAP SERPL CALCULATED.3IONS-SCNC: 6 MMOL/L (ref 4–13)
BACTERIA UR QL AUTO: ABNORMAL /HPF
BILIRUB UR QL STRIP: NEGATIVE
BUN SERPL-MCNC: 19 MG/DL (ref 5–25)
CALCIUM SERPL-MCNC: 9 MG/DL (ref 8.3–10.1)
CAOX CRY URNS QL MICRO: ABNORMAL /HPF
CHLORIDE SERPL-SCNC: 107 MMOL/L (ref 96–108)
CLARITY UR: CLEAR
CO2 SERPL-SCNC: 23 MMOL/L (ref 21–32)
COLOR UR: YELLOW
CREAT SERPL-MCNC: 1.11 MG/DL (ref 0.6–1.3)
ERYTHROCYTE [DISTWIDTH] IN BLOOD BY AUTOMATED COUNT: 14.5 % (ref 11.6–15.1)
GFR SERPL CREATININE-BSD FRML MDRD: 69 ML/MIN/1.73SQ M
GLUCOSE SERPL-MCNC: 210 MG/DL (ref 65–140)
GLUCOSE SERPL-MCNC: 250 MG/DL (ref 65–140)
GLUCOSE SERPL-MCNC: 306 MG/DL (ref 65–140)
GLUCOSE SERPL-MCNC: 353 MG/DL (ref 65–140)
GLUCOSE SERPL-MCNC: 372 MG/DL (ref 65–140)
GLUCOSE SERPL-MCNC: 379 MG/DL (ref 65–140)
GLUCOSE UR STRIP-MCNC: ABNORMAL MG/DL
HCT VFR BLD AUTO: 36.1 % (ref 36.5–49.3)
HGB BLD-MCNC: 10.8 G/DL (ref 12–17)
HGB UR QL STRIP.AUTO: NEGATIVE
HYALINE CASTS #/AREA URNS LPF: ABNORMAL /LPF
KETONES UR STRIP-MCNC: NEGATIVE MG/DL
LEUKOCYTE ESTERASE UR QL STRIP: NEGATIVE
MCH RBC QN AUTO: 29 PG (ref 26.8–34.3)
MCHC RBC AUTO-ENTMCNC: 29.9 G/DL (ref 31.4–37.4)
MCV RBC AUTO: 97 FL (ref 82–98)
NITRITE UR QL STRIP: NEGATIVE
NON-SQ EPI CELLS URNS QL MICRO: ABNORMAL /HPF
PH UR STRIP.AUTO: 6 [PH]
PLATELET # BLD AUTO: 195 THOUSANDS/UL (ref 149–390)
PMV BLD AUTO: 11.7 FL (ref 8.9–12.7)
POTASSIUM SERPL-SCNC: 4.4 MMOL/L (ref 3.5–5.3)
PROCALCITONIN SERPL-MCNC: <0.05 NG/ML
PROT UR STRIP-MCNC: ABNORMAL MG/DL
RBC # BLD AUTO: 3.72 MILLION/UL (ref 3.88–5.62)
RBC #/AREA URNS AUTO: ABNORMAL /HPF
SODIUM SERPL-SCNC: 136 MMOL/L (ref 135–147)
SP GR UR STRIP.AUTO: 1.02 (ref 1–1.03)
UROBILINOGEN UR STRIP-ACNC: 2 MG/DL
WBC # BLD AUTO: 7.64 THOUSAND/UL (ref 4.31–10.16)
WBC #/AREA URNS AUTO: ABNORMAL /HPF

## 2022-11-29 RX ORDER — INSULIN GLARGINE 100 [IU]/ML
16 INJECTION, SOLUTION SUBCUTANEOUS
Status: DISCONTINUED | OUTPATIENT
Start: 2022-11-29 | End: 2022-11-30

## 2022-11-29 RX ADMIN — MIDODRINE HYDROCHLORIDE 5 MG: 5 TABLET ORAL at 13:32

## 2022-11-29 RX ADMIN — INSULIN GLARGINE 16 UNITS: 100 INJECTION, SOLUTION SUBCUTANEOUS at 21:46

## 2022-11-29 RX ADMIN — RISPERIDONE 1 MG: 1 TABLET ORAL at 17:29

## 2022-11-29 RX ADMIN — INSULIN LISPRO 4 UNITS: 100 INJECTION, SOLUTION INTRAVENOUS; SUBCUTANEOUS at 16:29

## 2022-11-29 RX ADMIN — CALCITRIOL CAPSULES 0.25 MCG 0.25 MCG: 0.25 CAPSULE ORAL at 08:51

## 2022-11-29 RX ADMIN — POTASSIUM CHLORIDE 20 MEQ: 750 TABLET, EXTENDED RELEASE ORAL at 08:51

## 2022-11-29 RX ADMIN — Medication 12.5 MG: at 08:51

## 2022-11-29 RX ADMIN — INSULIN LISPRO 2 UNITS: 100 INJECTION, SOLUTION INTRAVENOUS; SUBCUTANEOUS at 21:46

## 2022-11-29 RX ADMIN — INSULIN LISPRO 6 UNITS: 100 INJECTION, SOLUTION INTRAVENOUS; SUBCUTANEOUS at 16:30

## 2022-11-29 RX ADMIN — HYDROCORTISONE SODIUM SUCCINATE 100 MG: 100 INJECTION, POWDER, FOR SOLUTION INTRAMUSCULAR; INTRAVENOUS at 21:45

## 2022-11-29 RX ADMIN — INSULIN LISPRO 3 UNITS: 100 INJECTION, SOLUTION INTRAVENOUS; SUBCUTANEOUS at 09:01

## 2022-11-29 RX ADMIN — DOCUSATE SODIUM 100 MG: 100 CAPSULE, LIQUID FILLED ORAL at 17:29

## 2022-11-29 RX ADMIN — Medication 12.5 MG: at 17:29

## 2022-11-29 RX ADMIN — APIXABAN 5 MG: 5 TABLET, FILM COATED ORAL at 08:51

## 2022-11-29 RX ADMIN — APIXABAN 5 MG: 5 TABLET, FILM COATED ORAL at 17:29

## 2022-11-29 RX ADMIN — DOCUSATE SODIUM 100 MG: 100 CAPSULE, LIQUID FILLED ORAL at 08:52

## 2022-11-29 RX ADMIN — INSULIN LISPRO 6 UNITS: 100 INJECTION, SOLUTION INTRAVENOUS; SUBCUTANEOUS at 09:01

## 2022-11-29 RX ADMIN — INSULIN LISPRO 4 UNITS: 100 INJECTION, SOLUTION INTRAVENOUS; SUBCUTANEOUS at 13:31

## 2022-11-29 RX ADMIN — FLUDROCORTISONE ACETATE 0.2 MG: 0.1 TABLET ORAL at 08:53

## 2022-11-29 RX ADMIN — VITAM B12 100 MCG: 100 TAB at 08:51

## 2022-11-29 RX ADMIN — POTASSIUM CHLORIDE 20 MEQ: 750 TABLET, EXTENDED RELEASE ORAL at 17:29

## 2022-11-29 RX ADMIN — SENNOSIDES 17.2 MG: 8.6 TABLET, FILM COATED ORAL at 08:51

## 2022-11-29 RX ADMIN — HYDROCORTISONE SODIUM SUCCINATE 100 MG: 100 INJECTION, POWDER, FOR SOLUTION INTRAMUSCULAR; INTRAVENOUS at 08:51

## 2022-11-29 RX ADMIN — RISPERIDONE 1 MG: 1 TABLET ORAL at 08:52

## 2022-11-29 RX ADMIN — INSULIN LISPRO 6 UNITS: 100 INJECTION, SOLUTION INTRAVENOUS; SUBCUTANEOUS at 13:31

## 2022-11-29 RX ADMIN — MIDODRINE HYDROCHLORIDE 5 MG: 5 TABLET ORAL at 16:29

## 2022-11-29 RX ADMIN — ATORVASTATIN CALCIUM 10 MG: 10 TABLET, FILM COATED ORAL at 08:51

## 2022-11-29 RX ADMIN — ASPIRIN 81 MG CHEWABLE TABLET 81 MG: 81 TABLET CHEWABLE at 08:51

## 2022-11-29 RX ADMIN — MIDODRINE HYDROCHLORIDE 5 MG: 5 TABLET ORAL at 09:12

## 2022-11-29 RX ADMIN — SERTRALINE 100 MG: 100 TABLET, FILM COATED ORAL at 08:51

## 2022-11-29 NOTE — ASSESSMENT & PLAN NOTE
Assessment: This is a 74 y o  male with history of prior stroke, T1DM, HTN with orthostatic hypotension, paroxsymal A-fib on Eliquis, HFpEF, and prior MI who is currently admitted due to hypotension in the setting of adrenal insufficiency  On presentation to the ED he was also complaining of a headache and thus CTH was obtained and it revealed possible new subacute to chronic infarct in the right basal ganglia which is what prompted consult to Neurology  On review of imaging, comparing the most recent [address] to the two from 10/19/22, this infarct appears to be chronic  Also reviewed imaging reports only (films not available) from [facility] including MRI of brain from 8/21/22 and 9/15/22 and he appears to have chronic infarcts in both the left and right basal ganglia  Based on the appearance of the chronic infarcts on CTH, these appear to be small vessel lacunar infarcts which are most likely due to his underlying vascular risk factors  Imaging Studies:  - MRI Brain: No MR evidence of acute ischemia  Volume loss/atrophy appears advanced for the patient's age  Old left lacunar infarct   - CTH: No acute intracranial abnormality  Subacute or chronic lacunar infarct in right basal ganglia, new since 10/19/2022  Unchanged chronic lacunar infarct in the basal ganglia  Plan:  - As strokes appear to be chronic, no indication for permissive HTN  - Continue Eliquis 5mg BID  - Continue Aspirin 81mg QD  - Consider increasing Lipitor to 20mg QD  - Continue tighter control of BGL and work to decrease A1C  - Most recent HgbA1c was 7 5% in July  - Most recent lipid panel from 8/18/22 [facility]): Cholesterol 148, Tg 83, HDL 67, LDL 64  - Echo was most recently done on 8/17/22  No indication to update at this time  - Monitor on telemetry  - PT/OT/ST/PMR as appropriate    - Follow up with PCP for continuation of treatment of other medical problems  - Follow up with neurologist at [facility]  - No further inpatient recommendations from neurology  Please call for any questions

## 2022-11-29 NOTE — ASSESSMENT & PLAN NOTE
Lab Results   Component Value Date    HGBA1C 7 5 (H) 07/22/2022       Recent Labs     11/28/22  1918 11/28/22  2254 11/29/22  0713 11/29/22  1058   POCGLU 161* 213* 306* 353*       Blood Sugar Average: Last 72 hrs:  · (P) 221   · Diabetic diet  · fingersticks QID with sliding scale coverage  · Continue basal lantus and prandial insulin -  Expect needs will increase now that patient is on IV higher dose steroids, will discuss with endocrinology  · Of note patient has been profoundly hypoglycemic in the past on hospitalizations and required intubation

## 2022-11-29 NOTE — OCCUPATIONAL THERAPY NOTE
Pt is a 74 y o  male who was admitted to the [address] [place] [address] on 11/28/2022 with BP of 70/46 and reports of fatigue and headaches  Pt presents with primary diagnosis of adrenal insufficiency and active problems including CVA< obesity, unintentional weight loss, DM, orthostatic hypotension, psychiatric disorder, paroxymal atrial fibrillation  Prior to admission pt reports living in a 2  with 5STE with his SO who is reported to have dementia per chart review; however pt reports having supportive daughter who visits 4x per day for meals and other assistance  Pt reports being independent with ADLs, revieces help for IADLs and was I with functional mobility without use of AD; (-)   Currently the pt requires S for ADLs, bed mobility, functional transfers and functional mobility without use of AD  Patient presents with functional deficits in decreased activity tolerance and task initiation; which impact the occupational performance areas: functional mobility  The patient's raw score on the AM-PAC Daily Activity inpatient short form is 22, standardized score is 47 1, greater than 39 4  Patients at this level are likely to benefit from discharge to home  Please refer to the recommendation of the Occupational Therapist for safe discharge planning  No DME/AD needs upon discharge  At this time, based off of skilled OT evaluation recommend discharge home with increased family support  No further skilled OT service needs, pt discharged from caseload

## 2022-11-29 NOTE — ASSESSMENT & PLAN NOTE
· Unclear etiology of the inciting factor  He currently denies any symptoms  His daughter reports that he also has a mild productive cough  · Will check blood cultures, UA, and sputum culture  · Legs examined, chronic appearing wounds are present but do not appear acutely infected    · Continue hydrocortisone 100mg IV q8h until stable  · Consult endocrinology for assistance weaning down hydrocortisone  · Blood pressure is currently stable on IV steroids

## 2022-11-29 NOTE — UTILIZATION REVIEW
Initial Clinical Review    Admission: Date/Time/Statement:   Admission Orders (From admission, onward)     Ordered        11/28/22 1737  [address] [place]  [address]                      Orders Placed This Encounter   Procedures   • INPATIENT ADMISSION     Standing Status:   Standing     Number of Occurrences:   1     Order Specific Question:   Level of Care     Answer:   Med Surg [16]     Order Specific Question:   Estimated length of stay     Answer:   More than 2 Midnights     Order Specific Question:   Certification     Answer:   I certify that inpatient services are medically necessary for this patient for a duration of greater than two midnights  See H&P and MD Progress Notes for additional information about the patient's course of treatment  ED Arrival Information     Expected   -    Arrival   11/28/2022 15:09    Acuity   Emergent            Means of arrival   Walk-In    Escorted by   Family Member    Service   Hospitalist    Admission type   Emergency            Arrival complaint   low BP           Chief Complaint   Patient presents with   • Hypotension     Per family, pt's remote monitoring registered BP 70/46, repeat bp around lunch was lower; pt reports headache and feeling tired       Initial Presentation: 74 y o  male presents to the ED from home with c/o hypotension x 3 days with headaches and poor appetite, stable glucose, chronic productive cough  PMH: adrenal insufficiency, IDDM, PAF, anemia of chronic disease, psychiatric disorder, orthostatic hypotension, obesity  In the ED, initial imaging shows poss sub-acute vs chronic lacunar CVA but MRI notes this is an old lacunar infarct and no acute ischemia  He has elevated creat and negative troponins  He is treated with IV fluid bolus 500 cc, IV SOluCortef, Midodrine, Lopressor PO, insulin  He is admitted to INPATIENT status with Adrenal insufficiency - pancultures, IV SoluCortef  q 8 hr, consult Endocrinology  R/o CVA - MRI negative        11/28 Neuro Quick Note - will get MRI Brain, full neuro consult to follow, strokes appear to be at least subacute to even possibly chronic, no indication for permissive HTN  Date: 11/29   Day 2:   MRI Brain negative  BP improved today  Glucose elevated today  11/29 Neuro Consult - MRI Negative for acute CVA, no need for permissive HTN, continue Eliquis, ASA, increase Lipitor to 20 mg daily, tight glucose control, tele, therapy evals  OP f/u with PCP and [facility] Neuro  11/29 Endocrinology Consult - secondary adrenal insufficiency, Hypotension d/t adrenal crisis, h/o orthostatic Hypotension, A fib - on hydrocortisone 100 mg IV q 8 hours and  fludrocortisone 0 2 mg qd and his home meds are Hydrocortisone 10 mg BID and fludrocortisone 0 2 mg qd       ED Triage Vitals   Temperature Pulse Respirations Blood Pressure SpO2   11/28/22 1524 11/28/22 1524 11/28/22 1524 11/28/22 1524 11/28/22 1524   97 9 °F (36 6 °C) 56 18 (!) 77/54 100 %      Temp Source Heart Rate Source Patient Position - Orthostatic VS BP Location FiO2 (%)   11/28/22 1524 11/28/22 1524 11/28/22 1524 11/28/22 1524 --   Oral Monitor Sitting Left arm       Pain Score       11/29/22 0344       No Pain          Wt Readings from Last 1 Encounters:   11/14/22 94 3 kg (208 lb)     Additional Vital Signs:   11/29/22 09:24:33 -- 84 -- 126/58 81 99 % -- --   11/29/22 09:22:43 -- 89 -- 131/59 83 100 % -- --   11/29/22 07:18:53 97 6 °F (36 4 °C) 86 16 150/93 112 98 % -- --   11/29/22 03:42:44 97 8 °F (36 6 °C) 79 -- 173/92 Abnormal  119 98 % -- --   11/29/22 0145 -- 68 20 164/74 106 99 % None (Room air) Lying   11/28/22 2345 -- 74 20 138/64 92 98 % None (Room air) Lying   11/28/22 2330 -- 74 20 150/66 -- 98 % None (Room air) Lying   11/28/22 2137 -- 70 16 125/58 -- 97 % None (Room air) Lying   11/28/22 1845 -- 64 16 165/71 102 100 % None (Room air) Sitting   11/28/22 1841 -- 66 -- 157/70 101 -- -- Sitting - Orthostatic VS   11/28/22 1840 -- -- -- 164/74 106 -- -- Lying - Orthostatic VS   11/28/22 1745 -- 60 18 154/69 99 100 % None (Room air) Sitting   11/28/22 1630 -- -- -- 132/59 -- -- -- --     Pertinent Labs/Diagnostic Test Results:     11/28 ECG - Normal sinus rhythm  Baseline artifact  Abnormal ECG    11/29 ECG - NSR    MRI brain wo contrast   Final Result by Florence Balderrama MD (11/28 3301)         1  No MR evidence of acute ischemia  2  Volume loss/atrophy appears advanced for the patient's age  3  Old left lacunar infarct  X-ray chest 1 view portable   Final Result by Nuria Carrera MD (11/28 1625)      No acute cardiopulmonary disease  CT head without contrast   Final Result by Zahida San MD (11/28 1658)      No acute intracranial abnormality      Subacute or chronic lacunar infarct in right basal ganglia, new since 10/19/2022  Unchanged chronic lacunar infarct in the basal ganglia       Results from last 7 days   Lab Units 11/28/22  1557   SARS-COV-2  Negative     Results from last 7 days   Lab Units 11/29/22  0447 11/28/22  1557   WBC Thousand/uL 7 64 7 84   HEMOGLOBIN g/dL 10 8* 10 1*   HEMATOCRIT % 36 1* 32 0*   PLATELETS Thousands/uL 195 214   NEUTROS ABS Thousands/µL  --  5 16         Results from last 7 days   Lab Units 11/29/22  0000 11/28/22  1557   SODIUM mmol/L 136 140   POTASSIUM mmol/L 4 4 4 4   CHLORIDE mmol/L 107 109*   CO2 mmol/L 23 28   ANION GAP mmol/L 6 3*   BUN mg/dL 19 16   CREATININE mg/dL 1 11 1 44*   EGFR ml/min/1 73sq m 69 50   CALCIUM mg/dL 9 0 9 3     Results from last 7 days   Lab Units 11/28/22  1557   AST U/L 20   ALT U/L 19   ALK PHOS U/L 77   TOTAL PROTEIN g/dL 7 1   ALBUMIN g/dL 3 6   TOTAL BILIRUBIN mg/dL 0 39     Results from last 7 days   Lab Units 11/29/22  1058 11/29/22  0713 11/28/22  2254 11/28/22  1918 11/28/22  1724 11/28/22  1535   POC GLUCOSE mg/dl 353* 306* 213* 161* 109 184*     Results from last 7 days   Lab Units 11/29/22  0000 11/28/22  1557   GLUCOSE RANDOM mg/dL 250* 140     Results from last 7 days   Lab Units 11/28/22  2136 11/28/22  1757 11/28/22  1557   HS TNI 0HR ng/L  --   --  10   HS TNI 2HR ng/L  --  8  --    HSTNI D2 ng/L  --  -2  --    HS TNI 4HR ng/L 7  --   --    HSTNI D4 ng/L -3  --   --                  Results from last 7 days   Lab Units 11/29/22  0625   PROCALCITONIN ng/ml <0 05     Results from last 7 days   Lab Units 11/29/22  0318   CLARITY UA  Clear   COLOR UA  Yellow   SPEC GRAV UA  1 024   PH UA  6 0   GLUCOSE UA mg/dl Trace*   KETONES UA mg/dl Negative   BLOOD UA  Negative   PROTEIN UA mg/dl Trace*   NITRITE UA  Negative   BILIRUBIN UA  Negative   UROBILINOGEN UA (BE) mg/dl 2 0*   LEUKOCYTES UA  Negative   WBC UA /hpf 2-4*   RBC UA /hpf 4-10*   BACTERIA UA /hpf Occasional   EPITHELIAL CELLS WET PREP /hpf None Seen     Results from last 7 days   Lab Units 11/28/22  1557   INFLUENZA A PCR  Negative   INFLUENZA B PCR  Negative   RSV PCR  Negative     Results from last 7 days   Lab Units 11/28/22  1911 11/28/22  1549   BLOOD CULTURE  Received in Microbiology Lab  Culture in Progress  Received in Microbiology Lab  Culture in Progress                 ED Treatment:   Medication Administration from 11/28/2022 1509 to 11/29/2022 0325       Date/Time Order Dose Route Action     11/28/2022 1601 EST sodium chloride 0 9 % bolus 500 mL 500 mL Intravenous New Bag     11/28/2022 1601 EST hydrocortisone (Solu-CORTEF) injection 100 mg 100 mg Intravenous Given     11/28/2022 2255 EST apixaban (ELIQUIS) tablet 5 mg 5 mg Oral Given     11/28/2022 2258 EST insulin glargine (LANTUS) subcutaneous injection 14 Units 0 14 mL 14 Units Subcutaneous Given     11/28/2022 1920 EST insulin lispro (HumaLOG) 100 units/mL subcutaneous injection 6 Units 6 Units Subcutaneous Given     11/28/2022 2255 EST midodrine (PROAMATINE) tablet 5 mg 5 mg Oral Given     11/28/2022 1841 EST metoprolol tartrate (LOPRESSOR) partial tablet 12 5 mg 12 5 mg Oral Given     11/28/2022 1841 EST potassium chloride (K-DUR,KLOR-CON) CR tablet 20 mEq 20 mEq Oral Given     11/28/2022 2255 EST risperiDONE (RisperDAL) tablet 1 mg 1 mg Oral Given     11/28/2022 1841 EST docusate sodium (COLACE) capsule 100 mg 100 mg Oral Given     11/28/2022 1919 EST insulin lispro (HumaLOG) 100 units/mL subcutaneous injection 1-5 Units 1 Units Subcutaneous Given     11/28/2022 2255 EST insulin lispro (HumaLOG) 100 units/mL subcutaneous injection 1-5 Units 2 Units Subcutaneous Given     11/28/2022 2304 EST hydrocortisone (Solu-CORTEF) injection 100 mg 100 mg Intravenous Given        Past Medical History:   Diagnosis Date   • Diabetes mellitus ([email or web address] 75 )      Present on Admission:  • Adrenal insufficiency   • Anemia of chronic disease  • Insulin dependent type 1 diabetes mellitus   • Obesity, morbid (HCC)  • Orthostatic hypotension  • Paroxysmal atrial fibrillation   • Psychiatric disorder      Admitting Diagnosis: Adrenal insufficiency ([facility]) [E27 40]  Hypotension [I95 9]  Basal ganglia infarction ([email or web address] 75 ) [I63 81]  Age/Sex: 74 y o  male  Admission Orders:  Scheduled Medications:  apixaban, 5 mg, Oral, BID  aspirin, 81 mg, Oral, Daily  atorvastatin, 10 mg, Oral, Daily  calcitriol, 0 25 mcg, Oral, Daily  cyanocobalamin, 100 mcg, Oral, Daily  docusate sodium, 100 mg, Oral, BID  fludrocortisone, 0 2 mg, Oral, Daily  hydrocortisone sodium succinate, 100 mg, Intravenous, Q8H SRINI  insulin glargine, 14 Units, Subcutaneous, HS  insulin lispro, 1-5 Units, Subcutaneous, TID AC  insulin lispro, 1-5 Units, Subcutaneous, HS  insulin lispro, 6 Units, Subcutaneous, TID With Meals  metoprolol tartrate, 12 5 mg, Oral, BID  midodrine, 5 mg, Oral, TID AC  potassium chloride, 20 mEq, Oral, BID  risperiDONE, 1 mg, Oral, BID  senna, 2 tablet, Oral, Daily  sertraline, 100 mg, Oral, Daily      Continuous IV Infusions:     PRN Meds:  acetaminophen, 650 mg, Oral, Q6H PRN  calcium carbonate, 1,000 mg, Oral, Daily PRN  ondansetron, 4 mg, Intravenous, Q6H PRN  sodium chloride (PF), 3 mL, Intravenous, Q1H PRN    Sputum culture  POC GLUCOSE AC/HS WITH [employer] COVERAGE   IP CONSULT TO ENDOCRINOLOGY  IP CONSULT TO NEUROLOGY    Network Utilization Review Department  ATTENTION: Please call with any questions or concerns to [phone] and carefully listen to the prompts so that you are directed to the right person  All voicemails are confidential   Candia Siemens all requests for admission clinical reviews, approved or denied determinations and any other requests to dedicated fax number below belonging to the campus where the patient is receiving treatment   List of dedicated fax numbers for the Facilities:  [address] DENIALS (Administrative/Medical Necessity) [phone]   [address] (Maternity/NICU/Pediatrics) [phone]   [facility] Lizz Fuller [phone]   [facility]  [phone]   [address] [address] Sushil Gross 28 [phone]   [address] [place] Heena Fiore [facility] 134 815 [facility] [phone]

## 2022-11-29 NOTE — OCCUPATIONAL THERAPY NOTE
Occupational Therapy Evaluation     Patient Name: Óscar Drummond  [id number]'N Date: 11/29/2022  Problem List  Principal Problem:    Adrenal insufficiency   Active Problems:    Insulin dependent type 1 diabetes mellitus     Paroxysmal atrial fibrillation     Anemia of chronic disease    Psychiatric disorder    Orthostatic hypotension    Obesity, morbid (HCC)    CVA (cerebral vascular accident) ([facility] [address] )    Unintentional weight loss    Past Medical History  Past Medical History:   Diagnosis Date    Diabetes mellitus ([facility] [address] )      Past Surgical History  History reviewed  No pertinent surgical history  11/29/22 0917   OT Last Visit   OT Visit Date 11/29/22   Note Type   Note type Evaluation   Pain Assessment   Pain Assessment Tool 0-10   Pain Score No Pain   Restrictions/Precautions   Weight Bearing Precautions Per Order No   Other Precautions Chair Alarm; Bed Alarm;Telemetry; Fall Risk  (Orthostatic precautions- hypotension)   Home Living   Type of [address] [place] [address] Two level;1/2 bath on main level; Able to live on main level with bedroom/bathroom   Bathroom Shower/Tub Walk-in shower   Bathroom Toilet Standard   [facility]  (pt expressed having a walker however does not use at this time)   Additional Comments Pt lives in a [facility] with 5STE with his SO  Per chart review SO has dementia and pt was providing care for SO  Prior Function   Level of [place] Independent with ADLs; Independent with functional mobility   Lives With Significant other  (Pt reports living with spouse; per chart review SO has dementia)   Receives Help From Family  (Pt reported daughter visits 4x/ day for meals and provides other assistance; Local grandchildren that drive and are able to help)   IADLs Family/Friend/Other provides transportation   Falls in the last 6 months 0   Vocational Retired   Comments Pt denies use of AD/ DME prior to admission     Lifestyle   Autonomy Pt reports being independent with ADLs, revieces help for IADLs and was I with functional mobility without use of AD  (-)   Pt reported that his SO does the cooking, cleaning, laundry  Reciprocal Relationships Supportive family   Service to Others Retired   [address] Enjoys watching tv and puzzles  ADL   Grooming Assistance 5  Supervision/Setup   UB Bathing Assistance 5  Supervision/Setup   LB Bathing Assistance 5  Supervision/Setup   UB Dressing Assistance 5  Supervision/Setup   LB Dressing Assistance 5  Supervision/Setup   Toileting Assistance  5  Supervision/Setup   Bed Mobility   Rolling R Unable to assess   Rolling L Unable to assess   Supine to Sit 5  Supervision   Additional items Increased time required   Sit to Supine 5  Supervision   Additional items Increased time required   Additional Comments pt initially supine in bed; At end of session pt left supine in bed with alarm engaged and all needs within reach  Transfers   Sit to Stand 5  Supervision   Additional items Increased time required   Stand to Sit 5  Supervision   Additional items Increased time required   Additional Comments No use of AD  Pt's blood pressure was taken in supine, and sitting and remained stable  Functional Mobility   Functional Mobility 5  Supervision   Additional Comments Close supervision for safety secondary to experienced dizziness; no overt LOB noted; Pt able to engage in functional mobility task for short household distance from EOB to hallway with no use of AD  Balance   Static Sitting Fair +   Dynamic Sitting Fair +   Static Standing Fair   Dynamic Standing Fair -   Ambulatory Fair -   Activity Tolerance   Activity Tolerance Patient tolerated treatment well   Medical Staff Made Aware OT Liyah; PT Mendy Sin; SPT [place] Pt evaluated with PT secondary to comorbidity, medical complexity, and present impairments which are a regression to the patients baseline      Nurse Made Aware Rn cleared pt for session   RUE Assessment RUE Assessment WFL   LUE Assessment   LUE Assessment WFL   Hand Function   Gross Motor Coordination Functional  (finger<>nose assessment)   Fine Motor Coordination Functional  (finger<> thumb opposition assessment)   Cognition   Arousal/Participation Cooperative   Attention Attends with cues to redirect   Orientation Level Oriented X4   Memory Within functional limits   Following Commands Follows multi step commands with increased time or repetition   Comments Pt was agreeable to session  demonstrated slow processing, decreased insight on situation however was oriented x4  Per chart review pt presents with a psychiatric disorder at baseline  BIMS- Brief Interview for mental status was administered and pt scored a 14/15  Cognition Assessment Tools Other (Comment)  (BIMS)   Score   (14/15)   Assessment   Assessment Pt is a 74 y o  male who was admitted to the [address] [place] [address] on 11/28/2022 with BP of 70/46 and reports of fatigue and headaches  Pt presents with primary diagnosis of adrenal insufficiency and active problems including CVA< obesity, unintentional weight loss, DM, orthostatic hypotension, psychiatric disorder, paroxymal atrial fibrillation  Prior to admission pt reports living in a 2  with 5STE with his SO who is reported to have dementia per chart review; however pt reports having supportive daughter who visits 4x per day for meals and other assistance  Pt reports being independent with ADLs, revieces help for IADLs and was I with functional mobility without use of AD; (-)   Currently the pt requires S for ADLs, bed mobility, functional transfers and functional mobility without use of AD  Patient presents with functional deficits in decreased activity tolerance and task initiation; which impact the occupational performance areas: functional mobility  The patient's raw score on the AM-PAC Daily Activity inpatient short form is 22, standardized score is 47 1, greater than 39 4   Patients at this level are likely to benefit from discharge to home  Please refer to the recommendation of the Occupational Therapist for safe discharge planning  No DME/AD needs upon discharge  At this time, based off of skilled OT evaluation recommend discharge home with increased family support  No further skilled OT service needs, pt discharged from caseload  Goals   Patient Goals To return home   Recommendation   OT Discharge Recommendation No rehabilitation needs  (Home with increased support)   AM-PAC Daily Activity Inpatient   Lower Body Dressing 4   Bathing 3   Toileting 4   Upper Body Dressing 3   Grooming 4   Eating 4   Daily Activity Raw Score 22   Daily Activity Standardized Score (Calc for Raw Score >=11) 47  1   AM-PAC Applied Cognition Inpatient   Following a Speech/Presentation 2   Understanding Ordinary Conversation 4   Taking Medications 3   Remembering Where Things Are Placed or Put Away 2   Remembering List of 4-5 Errands 2   Taking Care of Complicated Tasks 2   Applied Cognition Raw Score 15   Applied Cognition Standardized Score 33 54   End of Consult   Patient Position at End of Consult Supine;Bed/Chair alarm activated; All needs within reach   Nurse Communication Nurse aware of consult      HOLA Thapa

## 2022-11-29 NOTE — CONSULTS
Consultation - Lorena Foss 74 y o  male MRN: [id number]    Unit/Bed#: [facility] 723-01 Encounter: [id number]    Impression:  Secondary adrenal insufficiency   Hypotension possible 2/2 adrenal crisis  Hx of DM type 1   CVA  Hx of Orthostatic hypotension  A fib  Assessment: This is a 74y o -year-old male with pmhx of Adrenal insufficiency presented to the Hospital with CC of headaches, on admission pt was admitted for episode of hypotension and Sub acute CVA    Plan:  Currently on hydrocortisone 100 mg IV q 8 hours and fludrocortisone 0 2 mg qd  Home regimen: Hydrocortisone 10 mg BID and fludrocortisone 0 2 mg qd  Recommend to continue with hydrocortisone 100 mg Q 8 hours IV today and then taper down the dose to 100 mg q 12 hours tomorrow  Currently blood pressure stable  Patient looks good clinically  Continue with fludrocortisone 0 2 mg daily but  unsure why patient is on fludrocortisone if has secondary adrenal insufficiency as per documentation  And ACTH axis does not stimulate aldosterone secretion  Evaluate  Outpt  CC: Adrenal insufficiency       HPI: Lorena Foss is a 74y o  year old male with pmhx of Adrenal inssuficiency diagnosed in 2002, hx of A fib,DM type 1 with complications ( CVA, Neuropathy)  presented to the Hospital with CC of headache  Pt diagnosed with hypotension on admission possible 2/2 adrenal insufficiency  Pt is a poor historian about medical conditions however pt reports that started having a headache with a duration of 1-2 days  pt denies other adrenal insufficiency associated symptoms such as weakness, vision changes, hypoglycemia episodes  Pt states that is compliant with home medications and currently is following with Endocrinology outpt [facility] endocrinology) as per documentation pt was diagnosed with secondary adrenal insufficiency in 2002 and has had multiple ACTH stimulation tests that have been abormal and were tested at [facility]   Pt had multiple MRI brain in the past that has not demonstrated pituitary adenoma     Inpatient consult to Endocrinology  Consult performed by: Kaylynn Justin MD  Consult ordered by: Janay Meraz MD          Review of Systems   Constitutional: Negative for chills and fever  HENT: Negative for ear pain and sore throat  Eyes: Negative for pain and visual disturbance  Respiratory: Negative for cough and shortness of breath  Cardiovascular: Negative for chest pain and palpitations  Gastrointestinal: Negative for abdominal pain and vomiting  Genitourinary: Negative for dysuria and hematuria  Musculoskeletal: Negative for arthralgias and back pain  Skin: Negative for color change and rash  Neurological: Negative for seizures and syncope  All other systems reviewed and are negative  Historical Information   Past Medical History:   Diagnosis Date   • Diabetes mellitus ([facility] [address] )      History reviewed  No pertinent surgical history  Social History   Social History     Substance and Sexual Activity   Alcohol Use Not Currently   • Alcohol/week: 5 0 standard drinks   • Types: 5 Cans of beer per week    Comment: Quit in august     Social History     Substance and Sexual Activity   Drug Use Never     Social History     Tobacco Use   Smoking Status Former   • Packs/day: 0 25   • Years: 30 00   • Pack years: 7 50   • Types: Cigarettes   Smokeless Tobacco Never     Family History: History reviewed  No pertinent family history      Meds/Allergies   Current Facility-Administered Medications   Medication Dose Route Frequency Provider Last Rate Last Admin   • acetaminophen (TYLENOL) tablet 650 mg  650 mg Oral Q6H PRN Janay Meraz MD       • apixaban (ELIQUIS) tablet 5 mg  5 mg Oral BID Janay Meraz MD   5 mg at 11/29/22 0851   • aspirin chewable tablet 81 mg  81 mg Oral Daily Janay Meraz MD   81 mg at 11/29/22 0851   • atorvastatin (LIPITOR) tablet 10 mg  10 mg Oral Daily Janay Meraz MD   10 mg at 11/29/22 0851   • calcitriol (ROCALTROL) capsule 0 25 mcg  0 25 mcg Oral Daily Oxana Cortez MD   0 25 mcg at 11/29/22 6181   • calcium carbonate (TUMS) chewable tablet 1,000 mg  1,000 mg Oral Daily PRN Oxana Cortez MD       • cyanocobalamin (VITAMIN B-12) tablet 100 mcg  100 mcg Oral Daily Oxana Cortez MD   100 mcg at 11/29/22 0851   • docusate sodium (COLACE) capsule 100 mg  100 mg Oral BID Oxana Cortez MD   100 mg at 11/29/22 2755   • fludrocortisone (FLORINEF) tablet 0 2 mg  0 2 mg Oral Daily Oxana Cortez MD   0 2 mg at 11/29/22 0853   • hydrocortisone (Solu-CORTEF) injection 100 mg  100 mg Intravenous [facility] Oxana Cortez MD   100 mg at 11/29/22 0851   • insulin glargine (LANTUS) subcutaneous injection 14 Units 0 14 mL  14 Units Subcutaneous HS Oxana Cortez MD   14 Units at 11/28/22 2258   • insulin lispro (HumaLOG) 100 units/mL subcutaneous injection 1-5 Units  1-5 Units Subcutaneous TID [facility] Oxana Cortez MD   3 Units at 11/29/22 0901   • insulin lispro (HumaLOG) 100 units/mL subcutaneous injection 1-5 Units  1-5 Units Subcutaneous HS Oxana Cortez MD   2 Units at 11/28/22 2255   • insulin lispro (HumaLOG) 100 units/mL subcutaneous injection 6 Units  6 Units Subcutaneous TID With Meals Oxana Cortez MD   6 Units at 11/29/22 0901   • metoprolol tartrate (LOPRESSOR) partial tablet 12 5 mg  12 5 mg Oral BID Oxana Cortez MD   12 5 mg at 11/29/22 0851   • midodrine (PROAMATINE) tablet 5 mg  5 mg Oral TID AC Oxana Cortez MD   5 mg at 11/29/22 0912   • ondansetron (ZOFRAN) injection 4 mg  4 mg Intravenous Q6H PRN Oxana Cortez MD       • potassium chloride (K-DUR,KLOR-CON) CR tablet 20 mEq  20 mEq Oral BID Oxana Cortez MD   20 mEq at 11/29/22 0851   • risperiDONE (RisperDAL) tablet 1 mg  1 mg Oral BID Oxana Cortez MD   1 mg at 11/29/22 1771   • senna (SENOKOT) tablet 17 2 mg  2 tablet Oral Daily Oxana Cortez MD   17 2 mg at 11/29/22 0851   • sertraline (ZOLOFT) tablet 100 mg  100 mg Oral Daily Oxana Cortez MD   100 mg at 11/29/22 6274   • sodium chloride (PF) 0 9 % injection 3 mL  3 mL Intravenous Q1H PRN Amara Mock DO         Allergies   Allergen Reactions   • Imipramine Other (See Comments)   • Lisinopril Other (See Comments)   • Paroxetine Other (See Comments)   • Penicillins Hives   • Rosiglitazone Other (See Comments)   • Troglitazone Other (See Comments)       Objective   Vitals: Blood pressure 126/58, pulse 84, temperature 97 6 °F (36 4 °C), resp  rate 16, SpO2 99 %  No intake or output data in the 24 hours ending 11/29/22 1232  Invasive Devices     Peripheral Intravenous Line  Duration           Peripheral IV 11/28/22 Dorsal (posterior); Right Forearm <1 day                Physical Exam  Constitutional:       General: He is not in acute distress  Appearance: He is not ill-appearing  HENT:      Head: Normocephalic and atraumatic  Nose: No congestion or rhinorrhea  Eyes:      Conjunctiva/sclera: Conjunctivae normal       Pupils: Pupils are equal, round, and reactive to light  Cardiovascular:      Rate and Rhythm: Normal rate  Pulses: Normal pulses  Heart sounds: No murmur heard  Pulmonary:      Effort: No respiratory distress  Breath sounds: Normal breath sounds  No wheezing  Abdominal:      General: There is no distension  Tenderness: There is no abdominal tenderness  Musculoskeletal:         General: No swelling or tenderness  Cervical back: No rigidity or tenderness  Skin:     Coloration: Skin is not jaundiced or pale  Neurological:      Mental Status: He is alert and oriented to person, place, and time  Motor: No weakness  Psychiatric:         Mood and Affect: Mood normal          Thought Content:  Thought content normal            Lab Results:       Lab Results   Component Value Date    WBC 7 64 11/29/2022    HGB 10 8 (L) 11/29/2022    HCT 36 1 (L) 11/29/2022    MCV 97 11/29/2022     11/29/2022     Lab Results   Component Value Date/Time    BUN 19 11/29/2022 12:00 AM    K 4 4 11/29/2022 12:00 AM     11/29/2022 12:00 AM    CO2 23 11/29/2022 12:00 AM    CO2 33 (H) 10/19/2022 10:19 PM    CREATININE 1 11 11/29/2022 12:00 AM    AST 20 11/28/2022 03:57 PM    ALT 19 11/28/2022 03:57 PM    ALB 3 6 11/28/2022 03:57 PM     No results for input(s): CHOL, HDL, LDL, TRIG, VLDL in the last 72 hours  No results found for: Matheus Graham  POC Glucose (mg/dl)   Date Value   11/29/2022 353 (H)   11/29/2022 306 (H)   11/28/2022 213 (H)   11/28/2022 161 (H)   11/28/2022 109   11/28/2022 184 (H)   10/26/2022 195 (H)   10/26/2022 291 (H)   10/26/2022 258 (H)   10/25/2022 180 (H)         Portions of the record may have been created with voice recognition software

## 2022-11-29 NOTE — PROGRESS NOTES
Reny Stevens 1481 1955, 74 y o  male MRN: [id number]  Unit/Bed#: [facility] 723-01 Encounter: [id number]  Primary Care Provider: Moon Denis MD   Date and time admitted to hospital: 11/28/2022  3:27 PM    Unintentional weight loss  Assessment & Plan  · Patient has home visiting nurse, noted to weigh about 200 lb on 11/05, down to 180 lb just prior to admission  · About 20 lb unintentional weight loss over 3 weeks  · Per patient and collateral from daughter, p o  intake has not changed and patient  Was taking decent p o  at home  · Etiology unclear  · Nutrition consult   · Will consider CT torso to evaluate for occult malignancy    * Adrenal insufficiency   Assessment & Plan  · Unclear etiology of the inciting factor  He currently denies any symptoms  His daughter reports that he also has a mild productive cough  · Will check blood cultures, UA, and sputum culture  · Legs examined, chronic appearing wounds are present but do not appear acutely infected    · Continue hydrocortisone 100mg IV q8h until stable  · Consult endocrinology for assistance weaning down hydrocortisone  · Blood pressure is currently stable on IV steroids    Insulin dependent type 1 diabetes mellitus   Assessment & Plan  Lab Results   Component Value Date    HGBA1C 7 5 (H) 07/22/2022       Recent Labs     11/28/22  1918 11/28/22  2254 11/29/22  0713 11/29/22  1058   POCGLU 161* 213* 306* 353*       Blood Sugar Average: Last 72 hrs:  · (P) 221   · Diabetic diet  · fingersticks QID with sliding scale coverage  · Continue basal lantus and prandial insulin -  Expect needs will increase now that patient is on IV higher dose steroids, will discuss with endocrinology  · Of note patient has been profoundly hypoglycemic in the past on hospitalizations and required intubation    CVA (cerebral vascular accident) [facility])  Assessment & Plan  · Head CT done on presentation to CT, initial read c/f subacute vs chronic lacunar cva identified since last month - per neurology review of this plus MRI brain done, findings c/w chronic infarcts, no subacute or acute findings  · Possible due to ischemia from adrenal insufficiency as he has been compliant with his eliquis  · Continue aspirin and eliquis  · MRI brain c/w chronic infarcts only  · Neurology consulted, given chronic infarcts without any concern for subacute or acute findings, continue current management      Obesity, morbid ([address] )  Assessment & Plan  · Affects all aspects of his care  · Weight loss counseling when stable as an outpatient    Orthostatic hypotension  Assessment & Plan  · Continue midodrine and florinef at home dose  · Monitor orthostatics  · Bps  labile    Psychiatric disorder  Assessment & Plan  · Continue risperidone and sertraline    Anemia of chronic disease  Assessment & Plan  · Hb is above his baseline  · Continue B12 supplementation    Paroxysmal atrial fibrillation   Assessment & Plan  · Continue metoprolol for rate control  · Continue Eliquis for [facility]      VTE Pharmacologic Prophylaxis:   High Risk (Score >/= 5) - Pharmacological DVT Prophylaxis Ordered: apixaban (Eliquis)  Sequential Compression Devices Ordered  Patient Centered Rounds: d/w RN  Discussions with Specialists or Other Care Team Provider: neuro, awaiting endo input    Education and Discussions with Family / Patient: Updated  (daughter) via phone  Time Spent for Care: 30 minutes  More than 50% of total time spent on counseling and coordination of care as described above  Current Length of Stay: 1 day(s)  Current Patient Status: Inpatient   Certification Statement: The patient will continue to require additional inpatient hospital stay due to as above  Discharge Plan: Anticipate discharge in >72 hrs to discharge location to be determined pending rehab evaluations  Code Status: Level 1 - Full Code    Subjective:   No acute concerns   Confirmed he has been eating well at home  Objective:     Vitals:   Temp (24hrs), Av 8 °F (36 6 °C), Min:97 6 °F (36 4 °C), Max:97 9 °F (36 6 °C)    Temp:  [97 6 °F (36 4 °C)-97 9 °F (36 6 °C)] 97 6 °F (36 4 °C)  HR:  [56-89] 84  Resp:  [16-20] 16  BP: ()/(54-93) 126/58  SpO2:  [97 %-100 %] 99 %  There is no height or weight on file to calculate BMI  Input and Output Summary (last 24 hours):   No intake or output data in the 24 hours ending 22 1425    Physical Exam:   Physical Exam  Vitals reviewed  Constitutional:       General: He is not in acute distress  Appearance: He is not toxic-appearing  HENT:      Mouth/Throat:      Mouth: Mucous membranes are moist    Eyes:      General: No scleral icterus  Cardiovascular:      Rate and Rhythm: Normal rate and regular rhythm  Pulmonary:      Effort: Pulmonary effort is normal  No respiratory distress  Breath sounds: Normal breath sounds  Abdominal:      General: Bowel sounds are normal       Palpations: Abdomen is soft  Tenderness: There is no abdominal tenderness  Musculoskeletal:      Right lower leg: No edema  Left lower leg: No edema  Skin:     Comments: Chronic superficial LE wounds, no e/o infxn   Neurological:      General: No focal deficit present  Mental Status: He is alert and oriented to person, place, and time     Psychiatric:         Mood and Affect: Mood normal          Behavior: Behavior normal             Additional Data:     Labs:  Results from last 7 days   Lab Units 22  0447 22  1557   WBC Thousand/uL 7 64 7 84   HEMOGLOBIN g/dL 10 8* 10 1*   HEMATOCRIT % 36 1* 32 0*   PLATELETS Thousands/uL 195 214   NEUTROS PCT %  --  65   LYMPHS PCT %  --  17   MONOS PCT %  --  14*   EOS PCT %  --  3     Results from last 7 days   Lab Units 22  0000 22  1557   SODIUM mmol/L 136 140   POTASSIUM mmol/L 4 4 4 4   CHLORIDE mmol/L 107 109*   CO2 mmol/L 23 28   BUN mg/dL 19 16   CREATININE mg/dL 1 11 1 44* ANION GAP mmol/L 6 3*   CALCIUM mg/dL 9 0 9 3   ALBUMIN g/dL  --  3 6   TOTAL BILIRUBIN mg/dL  --  0 39   ALK PHOS U/L  --  77   ALT U/L  --  19   AST U/L  --  20   GLUCOSE RANDOM mg/dL 250* 140         Results from last 7 days   Lab Units 11/29/22  1058 11/29/22  0713 11/28/22  2254 11/28/22  1918 11/28/22  1724 11/28/22  1535   POC GLUCOSE mg/dl 353* 306* 213* 161* 109 184*         Results from last 7 days   Lab Units 11/29/22  0625   PROCALCITONIN ng/ml <0 05       Lines/Drains:  Invasive Devices     Peripheral Intravenous Line  Duration           Peripheral IV 11/28/22 Dorsal (posterior); Right Forearm <1 day                      Imaging: Reviewed radiology reports from this admission including: CT head and MRI brain    Recent Cultures (last 7 days):   Results from last 7 days   Lab Units 11/28/22  1911 11/28/22  1549   BLOOD CULTURE  Received in Microbiology Lab  Culture in Progress  Received in Microbiology Lab  Culture in Progress         Last 24 Hours Medication List:   Current Facility-Administered Medications   Medication Dose Route Frequency Provider Last Rate   • acetaminophen  650 mg Oral Q6H PRN Venkatesh Vargas MD     • apixaban  5 mg Oral BID Venkatesh Vargas MD     • aspirin  81 mg Oral Daily Venkatesh Vargas MD     • atorvastatin  10 mg Oral Daily Venkatesh Vargas MD     • calcitriol  0 25 mcg Oral Daily Venkatesh Vargas MD     • calcium carbonate  1,000 mg Oral Daily PRN Venkatesh Vargas MD     • cyanocobalamin  100 mcg Oral Daily Venkatesh Vargas MD     • docusate sodium  100 mg Oral BID Venkatesh Vargas MD     • fludrocortisone  0 2 mg Oral Daily Venkatesh Vargas MD     • hydrocortisone sodium succinate  100 mg Intravenous Q8H Ventura Romero MD     • insulin glargine  14 Units Subcutaneous HS Venkatesh Vargas MD     • insulin lispro  1-5 Units Subcutaneous TID AC Venkatesh Vargas MD     • insulin lispro  1-5 Units Subcutaneous HS Venkatesh Vargas MD     • insulin lispro  6 Units Subcutaneous TID With Meals DAYANARA Meaghan Valdez MD     • metoprolol tartrate  12 5 mg Oral BID Tony Little MD     • midodrine  5 mg Oral TID AC Tony Little MD     • ondansetron  4 mg Intravenous Q6H PRN Tony Little MD     • potassium chloride  20 mEq Oral BID Tony Little MD     • risperiDONE  1 mg Oral BID Tony Little MD     • senna  2 tablet Oral Daily Tony Little MD     • sertraline  100 mg Oral Daily Tony Little MD     • sodium chloride (PF)  3 mL Intravenous Q1H PRN Vik Amezquita DO          Today, Patient Was Seen By: Bruce Stockton MD    **Please Note: This note may have been constructed using a voice recognition system  **

## 2022-11-29 NOTE — ASSESSMENT & PLAN NOTE
· Head CT done on presentation to CT, initial read c/f subacute vs chronic lacunar cva identified since last month - per neurology review of this plus MRI brain done, findings c/w chronic infarcts, no subacute or acute findings  · Possible due to ischemia from adrenal insufficiency as he has been compliant with his eliquis  · Continue aspirin and eliquis  · MRI brain c/w chronic infarcts only  · Neurology consulted, given chronic infarcts without any concern for subacute or acute findings, continue current management

## 2022-11-29 NOTE — PLAN OF CARE
Problem: Potential for Falls  Goal: Patient will remain free of falls  Description: INTERVENTIONS:  - Educate patient/family on patient safety including physical limitations  - Instruct patient to call for assistance with activity   - Consult OT/PT to assist with strengthening/mobility   - Keep Call bell within reach  - Keep bed low and locked with side rails adjusted as appropriate  - Keep care items and personal belongings within reach  - Initiate and maintain comfort rounds  - Make Fall Risk Sign visible to staff  - Apply yellow socks and bracelet for high fall risk patients  - Consider moving patient to room near nurses station  Outcome: Progressing     Problem: MOBILITY - ADULT  Goal: Maintain or return to baseline ADL function  Description: INTERVENTIONS:  -  Assess patient's ability to carry out ADLs; assess patient's baseline for ADL function and identify physical deficits which impact ability to perform ADLs (bathing, care of mouth/teeth, toileting, grooming, dressing, etc )  - Assess/evaluate cause of self-care deficits   - Assess range of motion  - Assess patient's mobility; develop plan if impaired  - Assess patient's need for assistive devices and provide as appropriate  - Encourage maximum independence but intervene and supervise when necessary  - Involve family in performance of ADLs  - Assess for home care needs following discharge   - Consider OT consult to assist with ADL evaluation and planning for discharge  - Provide patient education as appropriate  Outcome: Progressing  Goal: Maintains/Returns to pre admission functional level  Description: INTERVENTIONS:  - Perform BMAT or MOVE assessment daily    - Set and communicate daily mobility goal to care team and patient/family/caregiver     - Collaborate with rehabilitation services on mobility goals if consulted  - Out of bed for toileting  - Record patient progress and toleration of activity level   Outcome: Progressing     Problem: PAIN - ADULT  Goal: Verbalizes/displays adequate comfort level or baseline comfort level  Description: Interventions:  - Encourage patient to monitor pain and request assistance  - Assess pain using appropriate pain scale  - Administer analgesics based on type and severity of pain and evaluate response  - Implement non-pharmacological measures as appropriate and evaluate response  - Consider cultural and social influences on pain and pain management  - Notify physician/advanced practitioner if interventions unsuccessful or patient reports new pain  Outcome: Progressing     Problem: INFECTION - ADULT  Goal: Absence or prevention of progression during hospitalization  Description: INTERVENTIONS:  - Assess and monitor for signs and symptoms of infection  - Monitor lab/diagnostic results  - Monitor all insertion sites, i e  indwelling lines, tubes, and drains  - Monitor endotracheal if appropriate and nasal secretions for changes in amount and color  - [place] appropriate cooling/warming therapies per order  - Administer medications as ordered  - Instruct and encourage patient and family to use good hand hygiene technique  - Identify and instruct in appropriate isolation precautions for identified infection/condition  Outcome: Progressing  Goal: Absence of fever/infection during neutropenic period  Description: INTERVENTIONS:  - Monitor WBC    Outcome: Progressing

## 2022-11-29 NOTE — ASSESSMENT & PLAN NOTE
· Patient has home visiting nurse, noted to weigh about 200 lb on 11/05, down to 180 lb just prior to admission  · About 20 lb unintentional weight loss over 3 weeks  · Per patient and collateral from daughter, p o  intake has not changed and patient  Was taking decent p o  at home  · Etiology unclear  · Nutrition consult   · Will consider CT torso to evaluate for occult malignancy

## 2022-11-29 NOTE — PLAN OF CARE
Problem: Potential for Falls  Goal: Patient will remain free of falls  Description: INTERVENTIONS:  - Educate patient/family on patient safety including physical limitations  - Instruct patient to call for assistance with activity   - Consult OT/PT to assist with strengthening/mobility   - Keep Call bell within reach  - Keep bed low and locked with side rails adjusted as appropriate  - Keep care items and personal belongings within reach  - Initiate and maintain comfort rounds  - Make Fall Risk Sign visible to staff  - Offer Toileting every 2 Hours, in advance of need  - Initiate/Maintain bed alarm  - Apply yellow socks and bracelet for high fall risk patients  - Consider moving patient to room near nurses station  Outcome: Progressing     Problem: MOBILITY - ADULT  Goal: Maintain or return to baseline ADL function  Description: INTERVENTIONS:  -  Assess patient's ability to carry out ADLs; assess patient's baseline for ADL function and identify physical deficits which impact ability to perform ADLs (bathing, care of mouth/teeth, toileting, grooming, dressing, etc )  - Assess/evaluate cause of self-care deficits   - Assess range of motion  - Assess patient's mobility; develop plan if impaired  - Assess patient's need for assistive devices and provide as appropriate  - Encourage maximum independence but intervene and supervise when necessary  - Involve family in performance of ADLs  - Assess for home care needs following discharge   - Consider OT consult to assist with ADL evaluation and planning for discharge  - Provide patient education as appropriate  Outcome: Progressing  Goal: Maintains/Returns to pre admission functional level  Description: INTERVENTIONS:  - Perform BMAT or MOVE assessment daily    - Set and communicate daily mobility goal to care team and patient/family/caregiver  - Collaborate with rehabilitation services on mobility goals if consulted  - Perform Range of Motion 4 times a day    - Reposition patient every *2 hours    - Dangle patient 4 times a day  - Stand patient 4 times a day  - Ambulate patient 4 times a day  - Out of bed to chair 4times a day   - Out of bed for meals 4 times a day  - Out of bed for toileting  - Record patient progress and toleration of activity level   Outcome: Progressing     Problem: PAIN - ADULT  Goal: Verbalizes/displays adequate comfort level or baseline comfort level  Description: Interventions:  - Encourage patient to monitor pain and request assistance  - Assess pain using appropriate pain scale  - Administer analgesics based on type and severity of pain and evaluate response  - Implement non-pharmacological measures as appropriate and evaluate response  - Consider cultural and social influences on pain and pain management  - Notify physician/advanced practitioner if interventions unsuccessful or patient reports new pain  Outcome: Progressing     Problem: INFECTION - ADULT  Goal: Absence or prevention of progression during hospitalization  Description: INTERVENTIONS:  - Assess and monitor for signs and symptoms of infection  - Monitor lab/diagnostic results  - Monitor all insertion sites, i e  indwelling lines, tubes, and drains  - Monitor endotracheal if appropriate and nasal secretions for changes in amount and color  - [place] appropriate cooling/warming therapies per order  - Administer medications as ordered  - Instruct and encourage patient and family to use good hand hygiene technique  - Identify and instruct in appropriate isolation precautions for identified infection/condition  Outcome: Progressing  Goal: Absence of fever/infection during neutropenic period  Description: INTERVENTIONS:  - Monitor WBC    Outcome: Progressing

## 2022-11-29 NOTE — WOUND OSTOMY CARE
Consult Note - Wound   [place] 74 y o  male MRN: [id number]  Unit/Bed#: [facility] 723-01 Encounter: [id number]        History and Present Illness: Patient is seen today for wound care consult today   The patient is a 74year old male that is admitted with adrenal insufficiency , type 1 DM, CVA,orthostatic hypertension , psychiatric disorder , anemia and A- Fib   He is alert and accepting of the skin assessment   Continent of bowel and bladder   Close supervision to roll in the bed  Assessment Findings:   1  Bilateral heels dry and intact   2  Sacral dry and intact   3  Right posterior tibia area - POA DTI present on admission that has the appearance of a unstagable - DTI do evolve to a stage 3 stage 4 and unstagable   Wound bed is dry and intact and stable no fluctuance   4  Right and left anterior ankle POA  resolved areas of DTI   5  Left posterior ankle area - POA of a unstageable area that was a DTI that evolved to a stage 3 stage 4 or unstageable wound bed is stable and dry no noted fluctuance         Skin care plans:  1-Hydraguard to bilateral sacrum, buttock and heels BID and PRN  2-Elevate heels to offload pressure  3-Ehob cushion in chair when out of bed  4-Moisturize skin daily with skin nourishing cream   5-Turn/reposition q2h or when medically stable for pressure re-distribution on skin  6  Right and left posterior distal lower leg ankle areas - apply 3 M no sting daily then a ABD and delmer change daily             Wounds:  Wound 10/20/22 Pressure Injury Ankle Anterior;Right (Active)   Wound Image   11/29/22 1025   Wound Description Clean;Dry; Intact 11/29/22 1025   Catie-wound Assessment Clean;Dry; Intact 11/29/22 1025   Wound Length (cm) 0 cm 11/29/22 1025   Wound Width (cm) 0 cm 11/29/22 1025   Wound Depth (cm) 0 cm 11/29/22 1025   Wound Surface Area (cm^2) 0 cm^2 11/29/22 1025   Wound Volume (cm^3) 0 cm^3 11/29/22 1025   Calculated Wound Volume (cm^3) 0 cm^3 11/29/22 1025   Change in Wound Size % 100 11/29/22 1025   Drainage Amount None 11/29/22 1025   Treatments Site care 11/29/22 1025   Dressing Open to air 11/29/22 1025   Patient Tolerance Tolerated well 11/29/22 1025       Wound 10/20/22 Pressure Injury Ankle Anterior; Left (Active)   Wound Image   11/29/22 1023   Wound Description Clean;Dry; Intact 11/29/22 1023   Wound Length (cm) 0 cm 11/29/22 1023   Wound Width (cm) 0 cm 11/29/22 1023   Wound Depth (cm) 0 cm 11/29/22 1023   Wound Surface Area (cm^2) 0 cm^2 11/29/22 1023   Wound Volume (cm^3) 0 cm^3 11/29/22 1023   Calculated Wound Volume (cm^3) 0 cm^3 11/29/22 1023   Change in Wound Size % 100 11/29/22 1023   Drainage Amount None 11/29/22 1023   Treatments Site care 11/29/22 1023   Dressing Open to air 11/29/22 1023   Patient Tolerance Tolerated well 11/29/22 1023       Wound 11/29/22 Pressure Injury Tibial Distal;Posterior;Right (Active)   Wound Image   11/29/22 1024   Wound Description Clean;Dry; Intact; Brown 11/29/22 1024   Pressure Injury Stage U 11/29/22 1024   Catie-wound Assessment Clean;Dry; Intact 11/29/22 1024   Wound Length (cm) 1 cm 11/29/22 1024   Wound Width (cm) 4 2 cm 11/29/22 1024   Wound Depth (cm) 0 cm 11/29/22 1024   Wound Surface Area (cm^2) 4 2 cm^2 11/29/22 1024   Wound Volume (cm^3) 0 cm^3 11/29/22 1024   Calculated Wound Volume (cm^3) 0 cm^3 11/29/22 1024   Drainage Amount None 11/29/22 1024   Treatments Site care 11/29/22 1024   Dressing Open to air 11/29/22 0400   Patient Tolerance Tolerated well 11/29/22 1024       Wound 11/29/22 Pressure Injury Tibial Distal;Left;Posterior (Active)   Wound Image   11/29/22 1023   Wound Description Clean;Dry;Brown; Intact 11/29/22 1024   Pressure Injury Stage U 11/29/22 1024   Catie-wound Assessment Clean;Dry; Intact 11/29/22 1024   Wound Length (cm) 1 cm 11/29/22 1024   Wound Width (cm) 2 cm 11/29/22 1024   Wound Depth (cm) 0 cm 11/29/22 1024   Wound Surface Area (cm^2) 2 cm^2 11/29/22 1024   Wound Volume (cm^3) 0 cm^3 11/29/22 1024 Calculated Wound Volume (cm^3) 0 cm^3 11/29/22 1024   Drainage Amount None 11/29/22 1024   Treatments Site care 11/29/22 1024   Dressing Open to air 11/29/22 1024   Patient Tolerance Tolerated well 11/29/22 1024       Wound care will follow weekly call or tiger text     Joselin Hogue RN BSN CWOCN

## 2022-11-29 NOTE — CONSULTS
NEUROLOGY RESIDENCY CONSULT NOTE     Name: Danuta Minaya   Age & Sex: 74 y o  male   MRN: [id number]  Unit/Bed#: [facility] 723-01   Encounter: [id number]  Length of Stay: [address] will need follow up in at the next regular appointment with neurovascular provider at [facility]  He will not require outpatient neurological testing  Pending for discharge: Per Primary Team    ASSESSMENT & PLAN     CVA (cerebral vascular accident) [facility])  Assessment & Plan  Assessment: This is a 74 y o  male with history of prior stroke, T1DM, HTN with orthostatic hypotension, paroxsymal A-fib on Eliquis, HFpEF, and prior MI who is currently admitted due to hypotension in the setting of adrenal insufficiency  On presentation to the ED he was also complaining of a headache and thus CTH was obtained and it revealed possible new subacute to chronic infarct in the right basal ganglia which is what prompted consult to Neurology  On review of imaging, comparing the most recent [address] to the two from 10/19/22, this infarct appears to be chronic  Also reviewed imaging reports only (films not available) from [facility] including MRI of brain from 8/21/22 and 9/15/22 and he appears to have chronic infarcts in both the left and right basal ganglia  Based on the appearance of the chronic infarcts on CTH, these appear to be small vessel lacunar infarcts which are most likely due to his underlying vascular risk factors  Imaging Studies:  - MRI Brain: No MR evidence of acute ischemia  Volume loss/atrophy appears advanced for the patient's age  Old left lacunar infarct   - CTH: No acute intracranial abnormality  Subacute or chronic lacunar infarct in right basal ganglia, new since 10/19/2022  Unchanged chronic lacunar infarct in the basal ganglia      Plan:  - As strokes appear to be chronic, no indication for permissive HTN  - Continue Eliquis 5mg BID  - Continue Aspirin 81mg QD  - Consider increasing Lipitor to 20mg QD  - Continue tighter control of BGL and work to decrease A1C  - Most recent HgbA1c was 7 5% in July  - Most recent lipid panel from 8/18/22 [facility]): Cholesterol 148, Tg 83, HDL 67, LDL 64  - Echo was most recently done on 8/17/22  No indication to update at this time  - Monitor on telemetry  - PT/OT/ST/PMR as appropriate  - Follow up with PCP for continuation of treatment of other medical problems  - Follow up with neurologist at [facility]  - No further inpatient recommendations from neurology  Please call for any questions  Paroxysmal atrial fibrillation   Assessment & Plan  Compliant with Eliquis    SUBJECTIVE     Reason for Consult / Principal Problem: subacute to chronic stroke on CTH  Hx and PE limited by: None    HPI: Galina Mora is a 74 y o  male with history of prior strokes, T1DM, HTN, adrenal insufficiency with orthostatic hypotension, paroxsymal A-fib on Eliquis, HFpEF, RLS, depression, diabetic neuropathy, and prior MI  He presented to the ED for persistent hypotension at home  Neurology is consulted for possible subacute to chronic right basal ganglia infarction identified on CTH      To review, patient has a longstanding history of orthostatic hypotension and adrenal insufficiency  Previously followed at [facility] but recently transitioned care to [facility] after his most recent hospitalization  At home, he is currently on hydrocortisone and midodrine prn for hypotension  Over the last few days, he has had persistently low blood pressure readings  This morning his BP was in the 66'P systolic which is what prompted his family to bring him into the ED today  On initial presentation to the ED, his BP was 77/54  His family reports that he has had increased headaches and slightly worse confusion over the last few days  He currently describes his headaches as generalized/all over, throbbing pain, and constant  He has had similar headaches in the past  No migrainous features   He is now reporting that the headache has resolved since he was admitted without medications  As part of his workup in the ED, patient had a CT head which identified chronic infarct in the left basal ganglia and subacute to chronic infarct in the right basal ganglia, thus neurology was consulted  He denies any recent focal weakness, sensory changes, vision changes  No changes to speech or swallowing  He admits to having lightheadedness upon standing but no dizziness or vertiginous symptoms  Denies chest pain, SOB, abdominal pain, n/v/d  Daughter, Sharona Ji, also mentions that her father has had a significant weight loss over the last few weeks  Reviewed encounters through [facility] remote patient monitoring nurses -- weight was 202 2 lbs on 11/15 and today (11/28) it is now down to 181 8 lbs  This is an unintentional weight loss  He has been eating well  She cooks 2-3 meals per day for him and his girlfriend or he will prepare dinner  Feels that his appetite is good  No significant changes to his diet      Additionally he has history of T1DM and has had multiple ER visits and hospitalizations over the last 3 months for both hypoglycemia and hyperglycemia  Most recent admission was at [place] [address]  from 10/19 to 10/26  During his hospital stay had a rapid response called for hypoglycemia and unresponsiveness  His iSTAT glucose was critically low at < 20  He required intubation and brief ICU stay for the management of his hypoglycemia  Was recently seen in outpatient Endocrinology clinic on 11/14/22 by Dr Filomena Esquivel  His basal bolus and correctional insulin was updated at that appointment  He was advised to restart Dexcom sensor as soon as possible  He does not feel comfortable to use an insulin pump  They contacted the Endocrinologist office on 11/22/22 due to persistently high glucose readings and adjustments were made to his regimen   Since then his glucose readings have been much improved       Review of Outside Records:  - Had routine EEG at [facility] on 9/16/22 due to staring spells  Interpretation: "This electroencephalogram is within normal limits in the awake and drowsy state(s), as well as during photic stimulation and hyperventilation  "  - MRI brain wo contrast at [facility] on 9/17/22: 1  No acute infarction  2  No mass  3  Chronic ischemic changes in cerebral white noted  4  Atrophy "  - MRI brain wo contrast at [facility] on 8/21/22: "There are small acute infarction involving the bilateral basal ganglia and paraventricular corona radiata, more prominent on the left  There is hemorrhage associated with the left corona radiata infarction  Small focus of gradient susceptibility at the right corona radiata may also represent a small area of hemorrhage  "  - Transthoracic echo at [facility] on 8/17/22: Left ventricle is normal in size  Systolic function is normal with an ejection fraction of 55-60%  There is grade I (mild) diastolic dysfunction  Right Ventricle: Systolic function is normal  The aortic valve is trileaflet  The leaflets are mildly thickened  The leaflets are mildly calcified  There is mild regurgitation  Mitral Valve: There is annular calcification  There is trace regurgitation  Tricuspid Valve: There is trace regurgitation  Pulmonic Valve: There is trace regurgitation  PA pressure not calculated due to incomplete TR signal      -----------  On evaluation in the AM of 11/29/2022 the patient was resting in bed and in no apparent distress   The patient denied HA, CP, SOB, ABD-pain, N/V, or any new changes is his sensorium  The patient was alert to person, place, time, and event  He was able to spell 'world' angus- and retorgradely  He was able to complete basic arithmetic, and able to complete abstract reasoning  Inpatient consult to Neurology  Consult performed by: Sharron Perez DO  Consult ordered by: Nery Turcios MD        Historical Information   Past Medical History:   Diagnosis Date   • Diabetes mellitus [facility])      History reviewed   No pertinent surgical history  Social History   Social History     Substance and Sexual Activity   Alcohol Use Not Currently   • Alcohol/week: 5 0 standard drinks   • Types: 5 Cans of beer per week    Comment: Quit in august     Social History     Substance and Sexual Activity   Drug Use Never     E-Cigarette/Vaping   • E-Cigarette Use Never User      E-Cigarette/Vaping Substances     Social History     Tobacco Use   Smoking Status Former   • Packs/day: 0 25   • Years: 30 00   • Pack years: 7 50   • Types: Cigarettes   Smokeless Tobacco Never     Family History: History reviewed  No pertinent family history    Meds/Allergies   all current active meds have been reviewed, current meds:   Current Facility-Administered Medications   Medication Dose Route Frequency   • acetaminophen (TYLENOL) tablet 650 mg  650 mg Oral Q6H PRN   • apixaban (ELIQUIS) tablet 5 mg  5 mg Oral BID   • aspirin chewable tablet 81 mg  81 mg Oral Daily   • atorvastatin (LIPITOR) tablet 10 mg  10 mg Oral Daily   • calcitriol (ROCALTROL) capsule 0 25 mcg  0 25 mcg Oral Daily   • calcium carbonate (TUMS) chewable tablet 1,000 mg  1,000 mg Oral Daily PRN   • cyanocobalamin (VITAMIN B-12) tablet 100 mcg  100 mcg Oral Daily   • docusate sodium (COLACE) capsule 100 mg  100 mg Oral BID   • fludrocortisone (FLORINEF) tablet 0 2 mg  0 2 mg Oral Daily   • hydrocortisone (Solu-CORTEF) injection 100 mg  100 mg Intravenous Q8H [address]   • insulin glargine (LANTUS) subcutaneous injection 14 Units 0 14 mL  14 Units Subcutaneous HS   • insulin lispro (HumaLOG) 100 units/mL subcutaneous injection 1-5 Units  1-5 Units Subcutaneous TID AC   • insulin lispro (HumaLOG) 100 units/mL subcutaneous injection 1-5 Units  1-5 Units Subcutaneous HS   • insulin lispro (HumaLOG) 100 units/mL subcutaneous injection 6 Units  6 Units Subcutaneous TID With Meals   • metoprolol tartrate (LOPRESSOR) partial tablet 12 5 mg  12 5 mg Oral BID   • midodrine (PROAMATINE) tablet 5 mg  5 mg Oral TID AC   • ondansetron (ZOFRAN) injection 4 mg  4 mg Intravenous Q6H PRN   • potassium chloride (K-DUR,KLOR-CON) CR tablet 20 mEq  20 mEq Oral BID   • risperiDONE (RisperDAL) tablet 1 mg  1 mg Oral BID   • senna (SENOKOT) tablet 17 2 mg  2 tablet Oral Daily   • sertraline (ZOLOFT) tablet 100 mg  100 mg Oral Daily   • sodium chloride (PF) 0 9 % injection 3 mL  3 mL Intravenous Q1H PRN     , and PTA meds:   Prior to Admission Medications   Prescriptions Last Dose Informant Patient Reported? Taking? Insulin Glargine Solostar (Lantus SoloStar) 100 UNIT/ML SOPN   No No   Sig: Inject 0 11 mL (11 Units total) under the skin daily at bedtime   Patient taking differently: Inject 14 Units under the skin daily at bedtime   Insulin Pen Needle (BD Pen Needle Blessing 2nd Gen) 32G X 4 MM MISC   No No   Sig: For use with insulin pen  Pharmacy may dispense brand covered by insurance  Patient not taking: No sig reported   Insulin Pen Needle (BD Pen Needle Blessing 2nd Gen) 32G X 4 MM MISC   No No   Sig: For use with insulin pen  Pharmacy may dispense brand covered by insurance     NON FORMULARY   Yes No   Sig: Take 30 mcg by mouth daily at bedtime Rayaldee 30 mcg @ bedtime   Patient not taking: Reported on 2022   NON FORMULARY   Yes No   Si Bottle if needed Relion Glucose   apixaban (ELIQUIS) 5 mg   No No   Sig: Take 1 tablet (5 mg total) by mouth 2 (two) times a day   aspirin 81 mg chewable tablet   Yes No   Sig: Chew 81 mg daily   atorvastatin (LIPITOR) 10 mg tablet   Yes No   Sig: Take 1 tablet by mouth daily   calcitriol (ROCALTROL) 0 25 mcg capsule   No No   Sig: Take 1 capsule (0 25 mcg total) by mouth daily   cyanocobalamin (VITAMIN B-12) 100 mcg tablet   Yes No   Sig: Take by mouth daily   fludrocortisone (FLORINEF) 0 1 mg tablet   No No   Sig: Take 2 tablets (0 2 mg total) by mouth daily   hydrocortisone (CORTEF) 10 mg tablet   Yes No   Sig: Take 10 mg by mouth 2 (two) times a day   hydrocortisone (CORTEF) 20 mg tablet   Yes No insulin lispro (HumaLOG KwikPen) 100 units/mL injection pen   No No   Sig: Inject 6 Units under the skin 3 (three) times a day with meals   Patient taking differently: Inject 6 Units under the skin 3 (three) times a day with meals 7 with breakfast, 6 units with lunch and dinner  Plus sliding scale   metoprolol tartrate (LOPRESSOR) 25 mg tablet   Yes No   Sig: Take 12 5 mg by mouth 2 (two) times a day   midodrine (PROAMATINE) 5 mg tablet   No No   Sig: Take 1 tablet (5 mg total) by mouth 3 (three) times a day as needed (sbp < 110  Otherwise hold the medication)   potassium chloride (Klor-Con) 10 mEq tablet   Yes No   Sig: Take 20 mEq by mouth 2 (two) times a day   risperiDONE (RisperDAL) 1 mg tablet   Yes No   Sig: Take 1 tablet by mouth 2 (two) times a day   sertraline (ZOLOFT) 100 mg tablet   Yes No   Sig: Take 100 mg by mouth daily      Facility-Administered Medications: None     Allergies   Allergen Reactions   • Imipramine Other (See Comments)   • Lisinopril Other (See Comments)   • Paroxetine Other (See Comments)   • Penicillins Hives   • Rosiglitazone Other (See Comments)   • Troglitazone Other (See Comments)     Review of previous medical records was completed  Review of Systems   Constitutional: Negative for chills, diaphoresis, fatigue and fever  HENT: Negative for ear discharge, ear pain, facial swelling, nosebleeds, sinus pressure, sinus pain and trouble swallowing  Eyes: Negative for photophobia and pain  Respiratory: Negative for cough, chest tightness, shortness of breath and wheezing  Cardiovascular: Negative for chest pain and palpitations  Gastrointestinal: Negative for blood in stool, nausea and vomiting  Genitourinary: Negative for flank pain and hematuria  Musculoskeletal: Negative for neck pain and neck stiffness  Skin: Negative for wound     Neurological: Negative for dizziness, tremors, seizures, syncope, facial asymmetry, speech difficulty, weakness, light-headedness, numbness and headaches  Psychiatric/Behavioral: Negative for agitation, behavioral problems, confusion, decreased concentration, dysphoric mood, hallucinations, self-injury, sleep disturbance and suicidal ideas  The patient is not nervous/anxious and is not hyperactive  OBJECTIVE     Patient ID: Thomas Velazco is a 74 y o  male  Vitals:   Vitals:    22 0342 22 0718 22 0922 22 0924   BP: (!) 173/92 150/93 131/59 126/58   BP Location:       Pulse: 79 86 89 84   Resp:  16     Temp: 97 8 °F (36 6 °C) 97 6 °F (36 4 °C)     TempSrc:       SpO2: 98% 98% 100% 99%      There is no height or weight on file to calculate BMI  No intake or output data in the 24 hours ending 22 1005    Temperature:   Temp (24hrs), Av 8 °F (36 6 °C), Min:97 6 °F (36 4 °C), Max:97 9 °F (36 6 °C)    Temperature: 97 6 °F (36 4 °C)    Invasive Devices: Invasive Devices     Peripheral Intravenous Line  Duration           Peripheral IV 22 Dorsal (posterior); Right Forearm <1 day              Physical Exam  Vitals and nursing note reviewed  HENT:      Head: Normocephalic  Nose: Nose normal       Mouth/Throat:      Mouth: Mucous membranes are moist       Pharynx: No oropharyngeal exudate  Eyes:      Extraocular Movements: Extraocular movements intact and EOM normal       Pupils: Pupils are equal, round, and reactive to light  Cardiovascular:      Rate and Rhythm: Normal rate  Pulses: Normal pulses  Pulmonary:      Effort: Pulmonary effort is normal    Musculoskeletal:         General: Normal range of motion  Cervical back: Normal range of motion  Skin:     General: Skin is warm  Neurological:      General: No focal deficit present  Mental Status: He is alert and oriented to person, place, and time  Cranial Nerves: No cranial nerve deficit  Sensory: No sensory deficit  Motor: No weakness        Coordination: Coordination normal  Finger-Nose-Finger Test and Heel to [facility] Test normal       Deep Tendon Reflexes: Reflexes normal       Reflex Scores:       Tricep reflexes are 2+ on the right side and 2+ on the left side  Bicep reflexes are 2+ on the right side and 2+ on the left side  Brachioradialis reflexes are 2+ on the right side and 2+ on the left side  Patellar reflexes are 2+ on the right side and 2+ on the left side  Achilles reflexes are 2+ on the right side and 2+ on the left side  Psychiatric:         Mood and Affect: Mood normal          Speech: Speech normal         Neurologic Exam     Mental Status   Oriented to person, place, and time  Oriented to person  Oriented to place  Oriented to time  Oriented to year, month and date  Follows 3 step commands  Attention: normal  Concentration: normal    Speech: speech is normal   Level of consciousness: alert  Knowledge: good  Able to perform simple calculations  Able to name object  Able to repeat  Cranial Nerves     CN II   Right visual field deficit: none  Left visual field deficit: none     CN III, IV, VI   Pupils are equal, round, and reactive to light  Extraocular motions are normal    Pupils: pinpoint  Right pupil: Reactivity: brisk  Left pupil: Reactivity: brisk  CN III: no CN III palsy  CN VI: no CN VI palsy  Nystagmus: none   Ophthalmoparesis: none  Upgaze: normal  Downgaze: normal  Conjugate gaze: present    CN V   Facial sensation intact  CN VII   Facial expression full, symmetric       CN VIII   Hearing: intact    CN IX, X   Palate: symmetric    CN XI   Right sternocleidomastoid strength: normal  Left sternocleidomastoid strength: normal  Right trapezius strength: normal  Left trapezius strength: normal    CN XII   Tongue: not atrophic  Fasciculations: absent  Tongue deviation: none    Motor Exam   Muscle bulk: normal  Overall muscle tone: normal  Right arm tone: normal  Left arm tone: normal  Right arm pronator drift: absent  Left arm pronator drift: absent  Right leg tone: normal  Left leg tone: normal    Strength   Right deltoid: 5/5  Left deltoid: 5/5  Right biceps: 5/5  Left biceps: 5/5  Right triceps: 5/5  Left triceps: 5/5  Right wrist flexion: 5/5  Left wrist flexion: 5/5  Right wrist extension: 5/5  Left wrist extension: /5  Right interossei: 4/5  Left interossei: 4/5  Right abdominals: 5/5  Left abdominals: 5/5  Right iliopsoas: 55  Left iliopsoas: 5/5  Right quadriceps: 55  Left quadriceps: 55  Right hamstrin/5  Left hamstrin/5  Right anterior tibial:   Left anterior tibial:   Right posterior tibial:   Left posterior tibial:   Right peroneal: 55  Left peroneal: 5  Right gastroc:   Left gastroc:     Sensory Exam   Light touch normal      Gait, Coordination, and Reflexes     Coordination   Finger to nose coordination: normal  Heel to shin coordination: normal    Tremor   Resting tremor: absent  Action tremor: absent    Reflexes   Right brachioradialis: 2+  Left brachioradialis: 2+  Right biceps: 2+  Left biceps: 2+  Right triceps: 2+  Left triceps: 2+  Right patellar: 2+  Left patellar: 2+  Right achilles: 2+  Left achilles: 2+  Right : 2+  Left : 2+  Right plantar: normal  Left plantar: normal  Right Fam: absent  Left Fam: absent  Right ankle clonus: absent  Left ankle clonus: absent     LABORATORY DATA     Labs: I have personally reviewed pertinent reports      Results from last 7 days   Lab Units 22  0447 22  1557   WBC Thousand/uL 7 64 7 84   HEMOGLOBIN g/dL 10 8* 10 1*   HEMATOCRIT % 36 1* 32 0*   PLATELETS Thousands/uL 195 214   NEUTROS PCT %  --  65   MONOS PCT %  --  14*      Results from last 7 days   Lab Units 22  0000 22  1557   POTASSIUM mmol/L 4 4 4 4   CHLORIDE mmol/L 107 109*   CO2 mmol/L 23 28   BUN mg/dL 19 16   CREATININE mg/dL 1 11 1 44*   CALCIUM mg/dL 9 0 9 3   ALK PHOS U/L  --  77   ALT U/L  --  19   AST U/L  --  20                            IMAGING & DIAGNOSTIC TESTING     Radiology Results: I have personally reviewed pertinent reports  and I have personally reviewed pertinent films in PACS    X-ray chest 1 view portable  Result Date: 11/28/2022  Impression: No acute cardiopulmonary disease  Workstation performed: [id number]     CT head without contrast  Result Date: 11/28/2022  Impression: No acute intracranial abnormality Subacute or chronic lacunar infarct in right basal ganglia, new since 10/19/2022  Unchanged chronic lacunar infarct in the basal ganglia  The study was marked in [facility] for immediate notification  Workstation performed: [id number]     Other Diagnostic Testing: I have personally reviewed pertinent reports        ACTIVE MEDICATIONS     Current Facility-Administered Medications   Medication Dose Route Frequency   • acetaminophen (TYLENOL) tablet 650 mg  650 mg Oral Q6H PRN   • apixaban (ELIQUIS) tablet 5 mg  5 mg Oral BID   • aspirin chewable tablet 81 mg  81 mg Oral Daily   • atorvastatin (LIPITOR) tablet 10 mg  10 mg Oral Daily   • calcitriol (ROCALTROL) capsule 0 25 mcg  0 25 mcg Oral Daily   • calcium carbonate (TUMS) chewable tablet 1,000 mg  1,000 mg Oral Daily PRN   • cyanocobalamin (VITAMIN B-12) tablet 100 mcg  100 mcg Oral Daily   • docusate sodium (COLACE) capsule 100 mg  100 mg Oral BID   • fludrocortisone (FLORINEF) tablet 0 2 mg  0 2 mg Oral Daily   • hydrocortisone (Solu-CORTEF) injection 100 mg  100 mg Intravenous Q8H [address]   • insulin glargine (LANTUS) subcutaneous injection 14 Units 0 14 mL  14 Units Subcutaneous HS   • insulin lispro (HumaLOG) 100 units/mL subcutaneous injection 1-5 Units  1-5 Units Subcutaneous TID AC   • insulin lispro (HumaLOG) 100 units/mL subcutaneous injection 1-5 Units  1-5 Units Subcutaneous HS   • insulin lispro (HumaLOG) 100 units/mL subcutaneous injection 6 Units  6 Units Subcutaneous TID With Meals   • metoprolol tartrate (LOPRESSOR) partial tablet 12 5 mg  12 5 mg Oral BID   • midodrine (PROAMATINE) tablet 5 mg  5 mg Oral TID AC   • ondansetron (ZOFRAN) injection 4 mg  4 mg Intravenous Q6H PRN   • potassium chloride (K-DUR,KLOR-CON) CR tablet 20 mEq  20 mEq Oral BID   • risperiDONE (RisperDAL) tablet 1 mg  1 mg Oral BID   • senna (SENOKOT) tablet 17 2 mg  2 tablet Oral Daily   • sertraline (ZOLOFT) tablet 100 mg  100 mg Oral Daily   • sodium chloride (PF) 0 9 % injection 3 mL  3 mL Intravenous Q1H PRN     Prior to Admission medications    Medication Sig Start Date End Date Taking? Authorizing Provider   apixaban (ELIQUIS) 5 mg Take 1 tablet (5 mg total) by mouth 2 (two) times a day 11/8/22   Mendoza Guevara MD   aspirin 81 mg chewable tablet Chew 81 mg daily    Historical Provider, MD   atorvastatin (LIPITOR) 10 mg tablet Take 1 tablet by mouth daily 7/12/22   Historical Provider, MD   calcitriol (ROCALTROL) 0 25 mcg capsule Take 1 capsule (0 25 mcg total) by mouth daily 11/8/22   1395 JOSEP Fuller MD   cyanocobalamin (VITAMIN B-12) 100 mcg tablet Take by mouth daily 9/29/22   Historical Provider, MD   fludrocortisone (FLORINEF) 0 1 mg tablet Take 2 tablets (0 2 mg total) by mouth daily 11/8/22 11/8/23  Mendoza Guevara MD   hydrocortisone (CORTEF) 10 mg tablet Take 10 mg by mouth 2 (two) times a day 8/17/22   Historical Provider, MD   hydrocortisone (CORTEF) 20 mg tablet  9/29/22   Historical Provider, MD   Insulin Glargine Solostar (Lantus SoloStar) 100 UNIT/ML SOPN Inject 0 11 mL (11 Units total) under the skin daily at bedtime  Patient taking differently: Inject 14 Units under the skin daily at bedtime 11/14/22 12/14/22  1395 JOSEP Fuller MD   insulin lispro (HumaLOG KwikPen) 100 units/mL injection pen Inject 6 Units under the skin 3 (three) times a day with meals  Patient taking differently: Inject 6 Units under the skin 3 (three) times a day with meals 7 with breakfast, 6 units with lunch and dinner   Plus sliding scale 10/26/22   Mary Nash MD   Insulin Pen Needle (BD Pen Needle Blessing 2nd Gen) 32G X 4 MM MISC For use with insulin pen  Pharmacy may dispense brand covered by insurance  Patient not taking: No sig reported 10/26/22   Adarsh Blackmon MD   Insulin Pen Needle (BD Pen Needle Blessing 2nd Gen) 32G X 4 MM MISC For use with insulin pen  Pharmacy may dispense brand covered by insurance  11/28/22   Price Pendleton MD   metoprolol tartrate (LOPRESSOR) 25 mg tablet Take 12 5 mg by mouth 2 (two) times a day 9/8/22   Historical Provider, MD   midodrine (PROAMATINE) 5 mg tablet Take 1 tablet (5 mg total) by mouth 3 (three) times a day as needed (sbp < 110  Otherwise hold the medication) 10/26/22 11/25/22  Adarsh Blackmon MD   NON FORMULARY Take 30 mcg by mouth daily at bedtime Rayaldee 30 mcg @ bedtime  Patient not taking: Reported on 11/14/2022    Historical Provider, MD   NON FORMULARY 1 Bottle if needed Relion Glucose    Historical Provider, MD   potassium chloride (Klor-Con) 10 mEq tablet Take 20 mEq by mouth 2 (two) times a day 10/13/22 10/13/23  Historical Provider, MD   risperiDONE (RisperDAL) 1 mg tablet Take 1 tablet by mouth 2 (two) times a day    Historical Provider, MD   sertraline (ZOLOFT) 100 mg tablet Take 100 mg by mouth daily 3/16/22 3/16/23  Historical Provider, MD   Insulin Pen Needle (BD Pen Needle Blessing 2nd Gen) 32G X 4 MM MISC For use with insulin pen  Pharmacy may dispense brand covered by insurance   10/26/22 11/28/22  Adarsh Blackmon MD   risperiDONE (RisperDAL) 0 5 mg tablet Take 1 tablet by mouth 2 (two) times a day  Patient not taking: No sig reported 7/11/22 11/28/22  Historical Provider, MD   temazepam (RESTORIL) 30 mg capsule Take 30 mg by mouth daily as needed  Patient not taking: No sig reported  11/28/22  Historical Provider, MD   venlafaxine 150 MG TB24 24 hr tablet Take 1 tablet by mouth 2 (two) times a day  Patient not taking: No sig reported  11/28/22  Historical Provider, MD Mathur [address]     Code Status: Level 1 - Full Code  Advance Directive and Living Will:      Power of :    POLST:      VTE Pharmacologic Prophylaxis: Apixaban (Eliquis)  VTE Mechanical Prophylaxis: sequential compression device    ======    I have discussed the patient's history, physical exam findings, assessment, and plan in detail with attending, Dr Shayla Sims    Thank you for allowing me to participate in the care of your patient, Rogelio Kavya Colunga,   [facility], PGY-2

## 2022-11-29 NOTE — DISCHARGE INSTR - OTHER ORDERS
Skin care plans:  1-Preventative Hydraguard to bilateral sacrum, buttock and heels BID and PRN  2-Elevate heels to offload pressure  3-Ehob cushion in chair when out of bed  4-Moisturize skin daily with skin nourishing cream   5-Turn/reposition q2h or when medically stable for pressure re-distribution on skin  6  Right and left posterior distal lower leg ankle areas - Apply 3 M no sting to areas, cover with ABDs, and secure with [employer]  Change every other day or PRN displacement/ soilage

## 2022-11-30 ENCOUNTER — APPOINTMENT (INPATIENT)
Dept: RADIOLOGY | Facility: HOSPITAL | Age: 67
End: 2022-11-30

## 2022-11-30 LAB
ANION GAP SERPL CALCULATED.3IONS-SCNC: 6 MMOL/L (ref 4–13)
BASOPHILS # BLD AUTO: 0.01 THOUSANDS/ÂΜL (ref 0–0.1)
BASOPHILS NFR BLD AUTO: 0 % (ref 0–1)
BUN SERPL-MCNC: 22 MG/DL (ref 5–25)
CALCIUM SERPL-MCNC: 9.4 MG/DL (ref 8.3–10.1)
CHLORIDE SERPL-SCNC: 108 MMOL/L (ref 96–108)
CO2 SERPL-SCNC: 21 MMOL/L (ref 21–32)
CREAT SERPL-MCNC: 1.15 MG/DL (ref 0.6–1.3)
EOSINOPHIL # BLD AUTO: 0 THOUSAND/ÂΜL (ref 0–0.61)
EOSINOPHIL NFR BLD AUTO: 0 % (ref 0–6)
ERYTHROCYTE [DISTWIDTH] IN BLOOD BY AUTOMATED COUNT: 14.6 % (ref 11.6–15.1)
GFR SERPL CREATININE-BSD FRML MDRD: 66 ML/MIN/1.73SQ M
GLUCOSE SERPL-MCNC: 262 MG/DL (ref 65–140)
GLUCOSE SERPL-MCNC: 272 MG/DL (ref 65–140)
GLUCOSE SERPL-MCNC: 273 MG/DL (ref 65–140)
GLUCOSE SERPL-MCNC: 296 MG/DL (ref 65–140)
GLUCOSE SERPL-MCNC: 309 MG/DL (ref 65–140)
GLUCOSE SERPL-MCNC: 314 MG/DL (ref 65–140)
GLUCOSE SERPL-MCNC: 332 MG/DL (ref 65–140)
HCT VFR BLD AUTO: 32.9 % (ref 36.5–49.3)
HGB BLD-MCNC: 10.1 G/DL (ref 12–17)
IMM GRANULOCYTES # BLD AUTO: 0.06 THOUSAND/UL (ref 0–0.2)
IMM GRANULOCYTES NFR BLD AUTO: 1 % (ref 0–2)
LYMPHOCYTES # BLD AUTO: 1.25 THOUSANDS/ÂΜL (ref 0.6–4.47)
LYMPHOCYTES NFR BLD AUTO: 12 % (ref 14–44)
MCH RBC QN AUTO: 28.2 PG (ref 26.8–34.3)
MCHC RBC AUTO-ENTMCNC: 30.7 G/DL (ref 31.4–37.4)
MCV RBC AUTO: 92 FL (ref 82–98)
MONOCYTES # BLD AUTO: 0.75 THOUSAND/ÂΜL (ref 0.17–1.22)
MONOCYTES NFR BLD AUTO: 7 % (ref 4–12)
NEUTROPHILS # BLD AUTO: 8.65 THOUSANDS/ÂΜL (ref 1.85–7.62)
NEUTS SEG NFR BLD AUTO: 80 % (ref 43–75)
NRBC BLD AUTO-RTO: 0 /100 WBCS
PLATELET # BLD AUTO: 196 THOUSANDS/UL (ref 149–390)
PMV BLD AUTO: 11.8 FL (ref 8.9–12.7)
POTASSIUM SERPL-SCNC: 4.8 MMOL/L (ref 3.5–5.3)
PROCALCITONIN SERPL-MCNC: <0.05 NG/ML
RBC # BLD AUTO: 3.58 MILLION/UL (ref 3.88–5.62)
SODIUM SERPL-SCNC: 135 MMOL/L (ref 135–147)
WBC # BLD AUTO: 10.72 THOUSAND/UL (ref 4.31–10.16)

## 2022-11-30 RX ORDER — MIDODRINE HYDROCHLORIDE 5 MG/1
5 TABLET ORAL 3 TIMES DAILY PRN
Status: DISCONTINUED | OUTPATIENT
Start: 2022-11-30 | End: 2022-12-08 | Stop reason: HOSPADM

## 2022-11-30 RX ORDER — INSULIN LISPRO 100 [IU]/ML
8 INJECTION, SOLUTION INTRAVENOUS; SUBCUTANEOUS
Status: DISCONTINUED | OUTPATIENT
Start: 2022-11-30 | End: 2022-12-01

## 2022-11-30 RX ORDER — HYDRALAZINE HYDROCHLORIDE 20 MG/ML
5 INJECTION INTRAMUSCULAR; INTRAVENOUS EVERY 6 HOURS PRN
Status: DISCONTINUED | OUTPATIENT
Start: 2022-11-30 | End: 2022-12-08 | Stop reason: HOSPADM

## 2022-11-30 RX ORDER — INSULIN GLARGINE 100 [IU]/ML
20 INJECTION, SOLUTION SUBCUTANEOUS
Status: DISCONTINUED | OUTPATIENT
Start: 2022-11-30 | End: 2022-12-01

## 2022-11-30 RX ADMIN — DOCUSATE SODIUM 100 MG: 100 CAPSULE, LIQUID FILLED ORAL at 16:43

## 2022-11-30 RX ADMIN — Medication 12.5 MG: at 16:42

## 2022-11-30 RX ADMIN — MIDODRINE HYDROCHLORIDE 5 MG: 5 TABLET ORAL at 07:55

## 2022-11-30 RX ADMIN — INSULIN LISPRO 8 UNITS: 100 INJECTION, SOLUTION INTRAVENOUS; SUBCUTANEOUS at 16:45

## 2022-11-30 RX ADMIN — VITAM B12 100 MCG: 100 TAB at 07:55

## 2022-11-30 RX ADMIN — ATORVASTATIN CALCIUM 10 MG: 10 TABLET, FILM COATED ORAL at 07:54

## 2022-11-30 RX ADMIN — INSULIN LISPRO 3 UNITS: 100 INJECTION, SOLUTION INTRAVENOUS; SUBCUTANEOUS at 21:35

## 2022-11-30 RX ADMIN — APIXABAN 5 MG: 5 TABLET, FILM COATED ORAL at 07:56

## 2022-11-30 RX ADMIN — INSULIN LISPRO 6 UNITS: 100 INJECTION, SOLUTION INTRAVENOUS; SUBCUTANEOUS at 07:54

## 2022-11-30 RX ADMIN — INSULIN LISPRO 3 UNITS: 100 INJECTION, SOLUTION INTRAVENOUS; SUBCUTANEOUS at 06:40

## 2022-11-30 RX ADMIN — INSULIN GLARGINE 20 UNITS: 100 INJECTION, SOLUTION SUBCUTANEOUS at 21:37

## 2022-11-30 RX ADMIN — POTASSIUM CHLORIDE 20 MEQ: 750 TABLET, EXTENDED RELEASE ORAL at 16:42

## 2022-11-30 RX ADMIN — FLUDROCORTISONE ACETATE 0.2 MG: 0.1 TABLET ORAL at 07:55

## 2022-11-30 RX ADMIN — INSULIN LISPRO 4 UNITS: 100 INJECTION, SOLUTION INTRAVENOUS; SUBCUTANEOUS at 16:44

## 2022-11-30 RX ADMIN — RISPERIDONE 1 MG: 1 TABLET ORAL at 07:56

## 2022-11-30 RX ADMIN — RISPERIDONE 1 MG: 1 TABLET ORAL at 16:43

## 2022-11-30 RX ADMIN — HYDROCORTISONE SODIUM SUCCINATE 100 MG: 100 INJECTION, POWDER, FOR SOLUTION INTRAMUSCULAR; INTRAVENOUS at 07:57

## 2022-11-30 RX ADMIN — MIDODRINE HYDROCHLORIDE 5 MG: 5 TABLET ORAL at 11:06

## 2022-11-30 RX ADMIN — INSULIN LISPRO 8 UNITS: 100 INJECTION, SOLUTION INTRAVENOUS; SUBCUTANEOUS at 12:09

## 2022-11-30 RX ADMIN — CALCITRIOL CAPSULES 0.25 MCG 0.25 MCG: 0.25 CAPSULE ORAL at 07:56

## 2022-11-30 RX ADMIN — POTASSIUM CHLORIDE 20 MEQ: 750 TABLET, EXTENDED RELEASE ORAL at 07:56

## 2022-11-30 RX ADMIN — ASPIRIN 81 MG CHEWABLE TABLET 81 MG: 81 TABLET CHEWABLE at 07:55

## 2022-11-30 RX ADMIN — HYDROCORTISONE SODIUM SUCCINATE 100 MG: 100 INJECTION, POWDER, FOR SOLUTION INTRAMUSCULAR; INTRAVENOUS at 21:41

## 2022-11-30 RX ADMIN — INSULIN LISPRO 3 UNITS: 100 INJECTION, SOLUTION INTRAVENOUS; SUBCUTANEOUS at 11:05

## 2022-11-30 RX ADMIN — Medication 12.5 MG: at 07:54

## 2022-11-30 RX ADMIN — APIXABAN 5 MG: 5 TABLET, FILM COATED ORAL at 16:42

## 2022-11-30 RX ADMIN — IOHEXOL 99 ML: 350 INJECTION, SOLUTION INTRAVENOUS at 12:23

## 2022-11-30 RX ADMIN — SERTRALINE 100 MG: 100 TABLET, FILM COATED ORAL at 07:56

## 2022-11-30 NOTE — PROGRESS NOTES
Progress Note - Rogelio Hoff 74 y o  male MRN: [id number]    Unit/Bed#: -01 Encounter: [id number]      CC: diabetes f/u    Subjective: This is a 74y o -year-old male with pmhx of Adrenal insufficiency presented to the Hospital with CC of headaches, on admission pt was admitted for episode of hypotension and Sub acute CVA    Objective:     Vitals: Blood pressure (!) 175/89, pulse 79, temperature 98 4 °F (36 9 °C), resp  rate 16, weight 85 5 kg (188 lb 7 9 oz), SpO2 100 %  ,Body mass index is 32 35 kg/m²  Intake/Output Summary (Last 24 hours) at 11/30/2022 1541  Last data filed at 11/30/2022 1300  Gross per 24 hour   Intake 600 ml   Output --   Net 600 ml       Physical Exam:  General Appearance: awake, appears stated age and cooperative  Head: Normocephalic, without obvious abnormality, atraumatic  Extremities: moves all extremities  Skin: Skin color and temperature normal    Pulm: no labored breathing      POC Glucose (mg/dl)   Date Value   11/30/2022 273 (H)   11/30/2022 309 (H)   11/30/2022 296 (H)   11/30/2022 272 (H)   11/29/2022 210 (H)   11/29/2022 372 (H)   11/29/2022 379 (H)   11/29/2022 353 (H)   11/29/2022 306 (H)   11/28/2022 213 (H)     Impression:  Secondary adrenal insufficiency   Hypotension possible 2/2 adrenal crisis  Hx of DM type 1   CVA  Hx of Orthostatic hypotension  A fib       Assessment: This is a 74y o -year-old male with pmhx of Adrenal insufficiency presented to the Hospital with CC of headaches, on admission pt was admitted for episode of hypotension and Sub acute CVA     Adrenal insufficiency   Currently on hydrocortisone 100 mg IV q 12  Taper dose to 50 mg BID tomorrow  Continue  fludrocortisone 0 2 mg qd  Home regimen: Hydrocortisone 10 mg BID and fludrocortisone 0 2 mg qd  DM type 1  Home regimen lantus 14 units lispro 6 units tid  Current regimen> Lantus 16 units and lispro 8 units  Recommend to increase lantus dose to 20 units  Continue with lispro 8 units tid  Portions of the record may have been created with voice recognition software

## 2022-11-30 NOTE — ASSESSMENT & PLAN NOTE
· Unclear etiology of the inciting factor  He currently denies any symptoms  His daughter reports that he also has a mild productive cough  · Will check blood cultures, UA, and sputum culture  · Legs examined, chronic appearing wounds are present but do not appear acutely infected    · Continue hydrocortisone IV per endocrinology  · Consult endocrinology for assistance weaning down hydrocortisone

## 2022-11-30 NOTE — PROGRESS NOTES
[facility]  Progress Note - Alan Colungadanish 1955, 74 y o  male MRN: [id number]  Unit/Bed#: [facility]-01 Encounter: [id number]  Primary Care Provider: Adan Quiñonez MD   Date and time admitted to hospital: 11/28/2022  3:27 PM    Unintentional weight loss  Assessment & Plan  · Patient has home visiting nurse, noted to weigh about 200 lb on 11/05, down to 180 lb just prior to admission  · About 20 lb unintentional weight loss over 3 weeks  · Per patient and collateral from daughter, p o  intake has not changed and patient  Was taking decent p o  at home  · Etiology unclear  · Nutrition consult   · F/u CT torso to eval for malignancy    * Adrenal insufficiency   Assessment & Plan  · Unclear etiology of the inciting factor  He currently denies any symptoms  His daughter reports that he also has a mild productive cough  · Will check blood cultures, UA, and sputum culture  · Legs examined, chronic appearing wounds are present but do not appear acutely infected    · Continue hydrocortisone IV per endocrinology  · Consult endocrinology for assistance weaning down hydrocortisone    Insulin dependent type 1 diabetes mellitus   Assessment & Plan  Lab Results   Component Value Date    HGBA1C 7 5 (H) 07/22/2022       Recent Labs     11/30/22  0625 11/30/22  0719 11/30/22  0900 11/30/22  1058   POCGLU 272* 296* 309* 273*       Blood Sugar Average: Last 72 hrs:  · (P) 389 [id number]   · Diabetic diet  · fingersticks QID with sliding scale coverage  · Continue basal lantus and prandial insulin -  Expect needs will increase now that patient is on IV higher dose steroids, will discuss with endocrinology  · Of note patient has been profoundly hypoglycemic in the past on hospitalizations and required intubation    CVA (cerebral vascular accident) [facility])  Assessment & Plan  · Head CT done on presentation to CT, initial read c/f subacute vs chronic lacunar cva identified since last month - per neurology review of this plus MRI brain done, findings c/w chronic infarcts, no subacute or acute findings  · Possible due to ischemia from adrenal insufficiency as he has been compliant with his eliquis  · Continue aspirin and eliquis  · MRI brain c/w chronic infarcts only  · Neurology consulted, given chronic infarcts without any concern for subacute or acute findings, continue current management      Obesity, morbid ([address] )  Assessment & Plan  · Affects all aspects of his care  · Weight loss counseling when stable as an outpatient    Orthostatic hypotension  Assessment & Plan  · Possibly due to Diabetes w diabetic autonomic neuropathy as earlier this year he had been in the hospital with orthostatic hypotension  Despite high-dose glucocorticoids he was still orthostatic indicating adrenal insufficiency was not the cause  · Continue florinef at home dose, at home takes midodrine PRN - was getting scheduled here iso hypotension on presentation, however patient has been hypertensive since then so changed midodrine to PRN on 11/30  · Monitor orthostatics  · Bps  labile    Psychiatric disorder  Assessment & Plan  · Continue risperidone and sertraline    Anemia of chronic disease  Assessment & Plan  · Hb is above his baseline  · Continue B12 supplementation    Paroxysmal atrial fibrillation   Assessment & Plan  · Continue metoprolol for rate control  · Continue Eliquis for [facility]    Essential hypertension  Assessment & Plan  · Patient noted to be hypertensive on 11/30 after receiving around the clock midodrine (at home patient is on p r n  Midodrine for SBP is less than 110)  · Will discontinue midodrine and monitor blood pressure response, will hopefully down trend with this however if not will consider further antihypertensive medications (also note patient is on higher doses of steroids which could be worsening blood pressure)  · P r n   Hydralazine ordered as well      VTE Pharmacologic Prophylaxis:   Moderate Risk (Score 3-4) - Pharmacological DVT Prophylaxis Ordered: apixaban (Eliquis)  Patient Centered Rounds: Discussed with RN  Discussions with Specialists or Other Care Team Provider:  Endocrinology    Education and Discussions with Family / Patient: Updated  (daughter) via phone  Time Spent for Care: 30 minutes  More than 50% of total time spent on counseling and coordination of care as described above  Current Length of Stay: 2 day(s)  Current Patient Status: Inpatient   Certification Statement: The patient will continue to require additional inpatient hospital stay due to As above  Discharge Plan: Anticipate discharge in 48-72 hrs to discharge location to be determined pending rehab evaluations  Code Status: Level 1 - Full Code    Subjective:   No acute concerns today  No lightheadedness, chest pain, dyspnea, other concerns  Objective:     Vitals:   Temp (24hrs), Av 1 °F (36 7 °C), Min:97 4 °F (36 3 °C), Max:98 5 °F (36 9 °C)    Temp:  [97 4 °F (36 3 °C)-98 5 °F (36 9 °C)] 98 4 °F (36 9 °C)  HR:  [71-79] 79  Resp:  [16] 16  BP: (138-178)/(65-96) 175/89  SpO2:  [96 %-100 %] 100 %  Body mass index is 32 35 kg/m²  Input and Output Summary (last 24 hours): Intake/Output Summary (Last 24 hours) at 2022 1620  Last data filed at 2022 1300  Gross per 24 hour   Intake 600 ml   Output --   Net 600 ml       Physical Exam:   Physical Exam  Vitals reviewed  Constitutional:       General: He is not in acute distress  Appearance: He is not toxic-appearing  HENT:      Mouth/Throat:      Mouth: Mucous membranes are moist    Eyes:      General: No scleral icterus  Cardiovascular:      Rate and Rhythm: Normal rate and regular rhythm  Pulmonary:      Effort: Pulmonary effort is normal  No respiratory distress  Breath sounds: Normal breath sounds  Abdominal:      General: Bowel sounds are normal       Palpations: Abdomen is soft  Tenderness:  There is no abdominal tenderness  Musculoskeletal:      Right lower leg: No edema  Left lower leg: No edema  Skin:     Comments: Chronic LE wounds with clean dry dressing    Neurological:      General: No focal deficit present  Mental Status: He is alert and oriented to person, place, and time  Psychiatric:         Mood and Affect: Mood normal          Behavior: Behavior normal            Additional Data:     Labs:  Results from last 7 days   Lab Units 11/30/22  0616   WBC Thousand/uL 10 72*   HEMOGLOBIN g/dL 10 1*   HEMATOCRIT % 32 9*   PLATELETS Thousands/uL 196   NEUTROS PCT % 80*   LYMPHS PCT % 12*   MONOS PCT % 7   EOS PCT % 0     Results from last 7 days   Lab Units 11/30/22  0438 11/29/22  0000 11/28/22  1557   SODIUM mmol/L 135   < > 140   POTASSIUM mmol/L 4 8   < > 4 4   CHLORIDE mmol/L 108   < > 109*   CO2 mmol/L 21   < > 28   BUN mg/dL 22   < > 16   CREATININE mg/dL 1 15   < > 1 44*   ANION GAP mmol/L 6   < > 3*   CALCIUM mg/dL 9 4   < > 9 3   ALBUMIN g/dL  --   --  3 6   TOTAL BILIRUBIN mg/dL  --   --  0 39   ALK PHOS U/L  --   --  77   ALT U/L  --   --  19   AST U/L  --   --  20   GLUCOSE RANDOM mg/dL 262*   < > 140    < > = values in this interval not displayed  Results from last 7 days   Lab Units 11/30/22  1058 11/30/22  0900 11/30/22  0719 11/30/22  0625 11/29/22  2054 11/29/22  1627 11/29/22  1545 11/29/22  1058 11/29/22  0713 11/28/22  2254 11/28/22  1918 11/28/22  1724   POC GLUCOSE mg/dl 273* 309* 296* 272* 210* 372* 379* 353* 306* 213* 161* 109         Results from last 7 days   Lab Units 11/30/22  1105 11/29/22  0625   PROCALCITONIN ng/ml <0 05 <0 05       Lines/Drains:  Invasive Devices     Peripheral Intravenous Line  Duration           Peripheral IV 11/28/22 Dorsal (posterior); Right Forearm 2 days                      Imaging: No pertinent imaging reviewed      Recent Cultures (last 7 days):   Results from last 7 days   Lab Units 11/28/22  1911 11/28/22  1549   BLOOD CULTURE  No Growth at 24 hrs  No Growth at 24 hrs  Last 24 Hours Medication List:   Current Facility-Administered Medications   Medication Dose Route Frequency Provider Last Rate   • acetaminophen  650 mg Oral Q6H PRN Yessica Allred MD     • apixaban  5 mg Oral BID Yessica Allred MD     • aspirin  81 mg Oral Daily Yessica Allred MD     • atorvastatin  10 mg Oral Daily Yessica Allred MD     • calcitriol  0 25 mcg Oral Daily Yessica Allred MD     • calcium carbonate  1,000 mg Oral Daily PRN Yessica Allred MD     • cyanocobalamin  100 mcg Oral Daily Yessica Allred MD     • docusate sodium  100 mg Oral BID Yessica Allred MD     • fludrocortisone  0 2 mg Oral Daily Yessica Allred MD     • hydrALAZINE  5 mg Intravenous Q6H PRN Forbes Babinski, MD     • hydrocortisone sodium succinate  100 mg Intravenous Q12H Emiliano Rose MD     • [START ON 12/1/2022] hydrocortisone sodium succinate  50 mg Intravenous Q12H [facility] Emiliano Rose MD     • insulin glargine  20 Units Subcutaneous HS Emiliano Rose MD     • insulin lispro  1-5 Units Subcutaneous TID AC Yessica Allred MD     • insulin lispro  1-5 Units Subcutaneous HS Yessica Allred MD     • insulin lispro  8 Units Subcutaneous TID With Meals Forbes Babinski, MD     • metoprolol tartrate  12 5 mg Oral BID Yessica Allred MD     • midodrine  5 mg Oral TID PRN Forbes Babinski, MD     • ondansetron  4 mg Intravenous Q6H PRN Yessica Allred MD     • potassium chloride  20 mEq Oral BID Yessica Allred MD     • risperiDONE  1 mg Oral BID Yessica Allred MD     • senna  2 tablet Oral Daily Yessica Allred MD     • sertraline  100 mg Oral Daily Yessica Allred MD     • sodium chloride (PF)  3 mL Intravenous Q1H PRN Ngozi Forman DO          Today, Patient Was Seen By: Forbes Babinski, MD    **Please Note: This note may have been constructed using a voice recognition system  **

## 2022-11-30 NOTE — ASSESSMENT & PLAN NOTE
· Patient noted to be hypertensive on 11/30 after receiving around the clock midodrine (at home patient is on p r n  Midodrine for SBP is less than 110)  · Will discontinue midodrine and monitor blood pressure response, will hopefully down trend with this however if not will consider further antihypertensive medications (also note patient is on higher doses of steroids which could be worsening blood pressure)  · P r n   Hydralazine ordered as well

## 2022-11-30 NOTE — ASSESSMENT & PLAN NOTE
· Patient has home visiting nurse, noted to weigh about 200 lb on 11/05, down to 180 lb just prior to admission  · About 20 lb unintentional weight loss over 3 weeks  · Per patient and collateral from daughter, p o  intake has not changed and patient  Was taking decent p o  at home  · Etiology unclear  · Nutrition consult   · F/u CT torso to eval for malignancy

## 2022-11-30 NOTE — PROGRESS NOTES
Pastoral Care Progress Note    2022  Patient: Galina Mora : 1955  Admission Date & Time: 2022 1527  MRN: [id number] CSN: [id number]      Received referral to see Pt  Introduced myself, Pt welcomed me to sit down and visit  I asked him questions about his medical condition, healing process, support system at home, ability to work, other info to establish relationship and show care, allow him to express any concerns  Pt gave short answers, was not very talkative, yet was sincere  Said that he feels he's gotten better during his hospital stay, is retired, has a supportive system at home, thinks that he'll be sufficiently health in a few days to go home  I listened, encouraged, showed compassion and support, and prayed for his ongoing healing                 Chaplaincy Interventions Utilized:   Empowerment: Clarified, confirmed, or reviewed information from treatment team     Exploration: Explored emotional needs & resources and Explored relational needs & resources    Collaboration: Advocated for patient/family    Relationship Building: Cultivated a relationship of care and support    Ritual: Provided prayer    Chaplaincy Outcomes Achieved:  Debriefed/defused experience, Emotional resources utilized, and Identified meaningful connections     22 1500   Clinical Encounter Type   Visited With Patient   Routine Visit Follow-up   Referral From    Referral To

## 2022-11-30 NOTE — ASSESSMENT & PLAN NOTE
· Possibly due to Diabetes w diabetic autonomic neuropathy as earlier this year he had been in the hospital with orthostatic hypotension  Despite high-dose glucocorticoids he was still orthostatic indicating adrenal insufficiency was not the cause     · Continue florinef at home dose, at home takes midodrine PRN - was getting scheduled here iso hypotension on presentation, however patient has been hypertensive since then so changed midodrine to PRN on 11/30  · Monitor orthostatics  · Bps  labile

## 2022-11-30 NOTE — PLAN OF CARE
Problem: PAIN - ADULT  Goal: Verbalizes/displays adequate comfort level or baseline comfort level  Description: Interventions:  - Encourage patient to monitor pain and request assistance  - Assess pain using appropriate pain scale  - Administer analgesics based on type and severity of pain and evaluate response  - Implement non-pharmacological measures as appropriate and evaluate response  - Consider cultural and social influences on pain and pain management  - Notify physician/advanced practitioner if interventions unsuccessful or patient reports new pain  11/30/2022 0317 by Aleksey Beckwith LPN  Outcome: Progressing  11/30/2022 0317 by Aleksey Beckwith LPN  Outcome: Progressing

## 2022-11-30 NOTE — ASSESSMENT & PLAN NOTE
Lab Results   Component Value Date    HGBA1C 7 5 (H) 07/22/2022       Recent Labs     11/30/22  0625 11/30/22  0719 11/30/22  0900 11/30/22  1058   POCGLU 272* 296* 309* 273*       Blood Sugar Average: Last 72 hrs:  · (P) 562 [id number]   · Diabetic diet  · fingersticks QID with sliding scale coverage  · Continue basal lantus and prandial insulin -  Expect needs will increase now that patient is on IV higher dose steroids, will discuss with endocrinology  · Of note patient has been profoundly hypoglycemic in the past on hospitalizations and required intubation

## 2022-12-01 PROBLEM — E46 PROTEIN-CALORIE MALNUTRITION (HCC): Status: ACTIVE | Noted: 2022-12-01

## 2022-12-01 LAB
ANION GAP SERPL CALCULATED.3IONS-SCNC: 6 MMOL/L (ref 4–13)
BASOPHILS # BLD AUTO: 0 THOUSANDS/ÂΜL (ref 0–0.1)
BASOPHILS NFR BLD AUTO: 0 % (ref 0–1)
BUN SERPL-MCNC: 24 MG/DL (ref 5–25)
CALCIUM SERPL-MCNC: 8.8 MG/DL (ref 8.3–10.1)
CHLORIDE SERPL-SCNC: 104 MMOL/L (ref 96–108)
CO2 SERPL-SCNC: 28 MMOL/L (ref 21–32)
CREAT SERPL-MCNC: 1.16 MG/DL (ref 0.6–1.3)
EOSINOPHIL # BLD AUTO: 0 THOUSAND/ÂΜL (ref 0–0.61)
EOSINOPHIL NFR BLD AUTO: 0 % (ref 0–6)
ERYTHROCYTE [DISTWIDTH] IN BLOOD BY AUTOMATED COUNT: 14.8 % (ref 11.6–15.1)
GFR SERPL CREATININE-BSD FRML MDRD: 65 ML/MIN/1.73SQ M
GLUCOSE SERPL-MCNC: 283 MG/DL (ref 65–140)
GLUCOSE SERPL-MCNC: 296 MG/DL (ref 65–140)
GLUCOSE SERPL-MCNC: 298 MG/DL (ref 65–140)
GLUCOSE SERPL-MCNC: 317 MG/DL (ref 65–140)
GLUCOSE SERPL-MCNC: 331 MG/DL (ref 65–140)
HCT VFR BLD AUTO: 35 % (ref 36.5–49.3)
HGB BLD-MCNC: 10.6 G/DL (ref 12–17)
IMM GRANULOCYTES # BLD AUTO: 0.04 THOUSAND/UL (ref 0–0.2)
IMM GRANULOCYTES NFR BLD AUTO: 1 % (ref 0–2)
LYMPHOCYTES # BLD AUTO: 0.84 THOUSANDS/ÂΜL (ref 0.6–4.47)
LYMPHOCYTES NFR BLD AUTO: 10 % (ref 14–44)
MCH RBC QN AUTO: 28.3 PG (ref 26.8–34.3)
MCHC RBC AUTO-ENTMCNC: 30.3 G/DL (ref 31.4–37.4)
MCV RBC AUTO: 94 FL (ref 82–98)
MONOCYTES # BLD AUTO: 0.62 THOUSAND/ÂΜL (ref 0.17–1.22)
MONOCYTES NFR BLD AUTO: 7 % (ref 4–12)
NEUTROPHILS # BLD AUTO: 7.09 THOUSANDS/ÂΜL (ref 1.85–7.62)
NEUTS SEG NFR BLD AUTO: 82 % (ref 43–75)
NRBC BLD AUTO-RTO: 0 /100 WBCS
PLATELET # BLD AUTO: 200 THOUSANDS/UL (ref 149–390)
PMV BLD AUTO: 12.3 FL (ref 8.9–12.7)
POTASSIUM SERPL-SCNC: 4.2 MMOL/L (ref 3.5–5.3)
RBC # BLD AUTO: 3.74 MILLION/UL (ref 3.88–5.62)
SODIUM SERPL-SCNC: 138 MMOL/L (ref 135–147)
WBC # BLD AUTO: 8.59 THOUSAND/UL (ref 4.31–10.16)

## 2022-12-01 RX ORDER — INSULIN GLARGINE 100 [IU]/ML
30 INJECTION, SOLUTION SUBCUTANEOUS
Status: DISCONTINUED | OUTPATIENT
Start: 2022-12-01 | End: 2022-12-03

## 2022-12-01 RX ORDER — INSULIN LISPRO 100 [IU]/ML
10 INJECTION, SOLUTION INTRAVENOUS; SUBCUTANEOUS
Status: DISCONTINUED | OUTPATIENT
Start: 2022-12-01 | End: 2022-12-03

## 2022-12-01 RX ORDER — BISACODYL 10 MG
10 SUPPOSITORY, RECTAL RECTAL DAILY PRN
Status: DISCONTINUED | OUTPATIENT
Start: 2022-12-01 | End: 2022-12-08 | Stop reason: HOSPADM

## 2022-12-01 RX ORDER — POLYETHYLENE GLYCOL 3350 17 G/17G
17 POWDER, FOR SOLUTION ORAL DAILY
Status: DISCONTINUED | OUTPATIENT
Start: 2022-12-01 | End: 2022-12-08 | Stop reason: HOSPADM

## 2022-12-01 RX ADMIN — RISPERIDONE 1 MG: 1 TABLET ORAL at 09:57

## 2022-12-01 RX ADMIN — INSULIN LISPRO 3 UNITS: 100 INJECTION, SOLUTION INTRAVENOUS; SUBCUTANEOUS at 21:32

## 2022-12-01 RX ADMIN — ASPIRIN 81 MG CHEWABLE TABLET 81 MG: 81 TABLET CHEWABLE at 09:58

## 2022-12-01 RX ADMIN — SERTRALINE 100 MG: 100 TABLET, FILM COATED ORAL at 09:57

## 2022-12-01 RX ADMIN — INSULIN LISPRO 3 UNITS: 100 INJECTION, SOLUTION INTRAVENOUS; SUBCUTANEOUS at 11:54

## 2022-12-01 RX ADMIN — RISPERIDONE 1 MG: 1 TABLET ORAL at 17:14

## 2022-12-01 RX ADMIN — VITAM B12 100 MCG: 100 TAB at 09:57

## 2022-12-01 RX ADMIN — SENNOSIDES 17.2 MG: 8.6 TABLET, FILM COATED ORAL at 09:58

## 2022-12-01 RX ADMIN — DOCUSATE SODIUM 100 MG: 100 CAPSULE, LIQUID FILLED ORAL at 09:57

## 2022-12-01 RX ADMIN — INSULIN LISPRO 10 UNITS: 100 INJECTION, SOLUTION INTRAVENOUS; SUBCUTANEOUS at 17:17

## 2022-12-01 RX ADMIN — HYDROCORTISONE SODIUM SUCCINATE 50 MG: 100 INJECTION, POWDER, FOR SOLUTION INTRAMUSCULAR; INTRAVENOUS at 21:31

## 2022-12-01 RX ADMIN — POTASSIUM CHLORIDE 20 MEQ: 750 TABLET, EXTENDED RELEASE ORAL at 09:57

## 2022-12-01 RX ADMIN — INSULIN LISPRO 3 UNITS: 100 INJECTION, SOLUTION INTRAVENOUS; SUBCUTANEOUS at 06:00

## 2022-12-01 RX ADMIN — FLUDROCORTISONE ACETATE 0.2 MG: 0.1 TABLET ORAL at 09:57

## 2022-12-01 RX ADMIN — HYDROCORTISONE SODIUM SUCCINATE 50 MG: 100 INJECTION, POWDER, FOR SOLUTION INTRAMUSCULAR; INTRAVENOUS at 09:58

## 2022-12-01 RX ADMIN — ATORVASTATIN CALCIUM 10 MG: 10 TABLET, FILM COATED ORAL at 09:57

## 2022-12-01 RX ADMIN — APIXABAN 5 MG: 5 TABLET, FILM COATED ORAL at 09:57

## 2022-12-01 RX ADMIN — INSULIN LISPRO 8 UNITS: 100 INJECTION, SOLUTION INTRAVENOUS; SUBCUTANEOUS at 07:37

## 2022-12-01 RX ADMIN — APIXABAN 5 MG: 5 TABLET, FILM COATED ORAL at 17:14

## 2022-12-01 RX ADMIN — POLYETHYLENE GLYCOL 3350 17 G: 17 POWDER, FOR SOLUTION ORAL at 17:14

## 2022-12-01 RX ADMIN — Medication 12.5 MG: at 09:56

## 2022-12-01 RX ADMIN — Medication 12.5 MG: at 17:15

## 2022-12-01 RX ADMIN — CALCITRIOL CAPSULES 0.25 MCG 0.25 MCG: 0.25 CAPSULE ORAL at 09:57

## 2022-12-01 RX ADMIN — POTASSIUM CHLORIDE 20 MEQ: 750 TABLET, EXTENDED RELEASE ORAL at 17:14

## 2022-12-01 RX ADMIN — INSULIN LISPRO 3 UNITS: 100 INJECTION, SOLUTION INTRAVENOUS; SUBCUTANEOUS at 17:17

## 2022-12-01 RX ADMIN — INSULIN GLARGINE 30 UNITS: 100 INJECTION, SOLUTION SUBCUTANEOUS at 21:31

## 2022-12-01 RX ADMIN — INSULIN LISPRO 8 UNITS: 100 INJECTION, SOLUTION INTRAVENOUS; SUBCUTANEOUS at 11:54

## 2022-12-01 RX ADMIN — DOCUSATE SODIUM 100 MG: 100 CAPSULE, LIQUID FILLED ORAL at 17:14

## 2022-12-01 NOTE — PROGRESS NOTES
Reny Wells Rodney [address] 1955, 74 y o  male MRN: [id number]  Unit/Bed#: -01 Encounter: [id number]  Primary Care Provider: Maritza Robles MD   Date and time admitted to hospital: 11/28/2022  3:27 PM    Unintentional weight loss  Assessment & Plan  · Patient has home visiting nurse, noted to weigh about 200 lb on 11/05, down to 180 lb just prior to admission  · About 20 lb unintentional weight loss over 3 weeks  · Per patient and collateral from daughter, p o  intake has not changed and patient  Was taking decent p o  at home  · Etiology unclear  · Nutrition consult   · CT torso done 11/30 w/o e/o malignancy   · Pt reported increase stools recently, will d/w GI re: ?pancreatic insufficiency     * Adrenal insufficiency   Assessment & Plan  · Unclear etiology of the inciting factor  He currently denies any symptoms  His daughter reports that he also has a mild productive cough  · Will check blood cultures, UA, and sputum culture  · Legs examined, chronic appearing wounds are present but do not appear acutely infected    · Continue hydrocortisone IV per endocrinology  · Consult endocrinology for assistance weaning down hydrocortisone    Insulin dependent type 1 diabetes mellitus   Assessment & Plan  Lab Results   Component Value Date    HGBA1C 7 5 (H) 07/22/2022       Recent Labs     11/30/22  1615 11/30/22  2132 12/01/22  0555 12/01/22  1153   POCGLU 332* 314* 317* 283*       Blood Sugar Average: Last 72 hrs:  · (P) 256 [id number]   · Diabetic diet  · fingersticks QID with sliding scale coverage  · Continue basal lantus and prandial insulin -  Expect needs will increase now that patient is on IV higher dose steroids, will discuss with endocrinology  · Of note patient has been profoundly hypoglycemic in the past on hospitalizations and required intubation    Unspecified protein-calorie malnutrition ([facility] [address] )  Assessment & Plan  Malnutrition Findings: Adult Malnutrition type: Acute illness  Adult Degree of Malnutrition: Unspecified protein calorie malnutrition  Malnutrition Characteristics: Muscle loss, Weight loss                  360 Statement: in setting of unknown etiology - 9% wt loss x 2 weeks, (11/14/22 208 lbs - 11/30/22 188 5 lbs),  muscle wasting/slight depression/hollowing in temple region, treated with diet, PT refusing supplements    BMI Findings: Body mass index is 31 64 kg/m²  CVA (cerebral vascular accident) [facility])  Assessment & Plan  · Head CT done on presentation to CT, initial read c/f subacute vs chronic lacunar cva identified since last month - per neurology review of this plus MRI brain done, findings c/w chronic infarcts, no subacute or acute findings  · Possible due to ischemia from adrenal insufficiency as he has been compliant with his eliquis  · Continue aspirin and eliquis  · MRI brain c/w chronic infarcts only  · Neurology consulted, given chronic infarcts without any concern for subacute or acute findings, continue current management      Obesity, morbid ([address] )  Assessment & Plan  · Affects all aspects of his care  · Weight loss counseling when stable as an outpatient    Orthostatic hypotension  Assessment & Plan  · Possibly due to Diabetes w diabetic autonomic neuropathy as earlier this year he had been in the hospital with orthostatic hypotension  Despite high-dose glucocorticoids he was still orthostatic indicating adrenal insufficiency was not the cause     · Continue florinef at home dose, at home takes midodrine PRN - was getting scheduled here iso hypotension on presentation, however patient has been hypertensive since then so changed midodrine to PRN on 11/30  · Monitor orthostatics  · Bps  labile    Psychiatric disorder  Assessment & Plan  · Continue risperidone and sertraline    Anemia of chronic disease  Assessment & Plan  · Hb is above his baseline  · Continue B12 supplementation    Paroxysmal atrial fibrillation   Assessment & Plan  · Continue metoprolol for rate control  · Continue Eliquis for [facility]    Essential hypertension  Assessment & Plan  · Patient noted to be hypertensive on  after receiving around the clock midodrine (at home patient is on p r n  Midodrine for SBP is less than 110)  · Will discontinue midodrine and monitor blood pressure response, will hopefully down trend with this however if not will consider further antihypertensive medications (also note patient is on higher doses of steroids which could be worsening blood pressure)  · P r n  Hydralazine ordered as well        VTE Pharmacologic Prophylaxis:   Moderate Risk (Score 3-4) - Pharmacological DVT Prophylaxis Ordered: apixaban (Eliquis)  Patient Centered Rounds: d/w RN  Discussions with Specialists or Other Care Team Provider: jeffrey    Education and Discussions with Family / Patient: Updated  (daughter) via phone  Time Spent for Care: 30 minutes  More than 50% of total time spent on counseling and coordination of care as described above  Current Length of Stay: 3 day(s)  Current Patient Status: Inpatient   Certification Statement: The patient will continue to require additional inpatient hospital stay due to As above  Discharge Plan: Anticipate discharge in >72 hrs to discharge location to be determined pending rehab evaluations  Code Status: Level 1 - Full Code    Subjective:   No acute concerns today  Feels he is still eating very well  Discussed CT findings, patient did note he had pancreatitis in the remote past   Has noticed increase in stools recently although denies diarrhea  Objective:     Vitals:   Temp (24hrs), Av 7 °F (36 5 °C), Min:97 7 °F (36 5 °C), Max:97 7 °F (36 5 °C)    Temp:  [97 7 °F (36 5 °C)] 97 7 °F (36 5 °C)  HR:  [61-82] 61  Resp:  [15-18] 18  BP: (135-150)/() 137/66  SpO2:  [96 %-99 %] 99 %  Body mass index is 31 64 kg/m²       Input and Output Summary (last 24 hours): Intake/Output Summary (Last 24 hours) at 12/1/2022 1534  Last data filed at 12/1/2022 1532  Gross per 24 hour   Intake 200 ml   Output --   Net 200 ml       Physical Exam:   Physical Exam  Vitals reviewed  Constitutional:       General: He is not in acute distress  Appearance: He is not toxic-appearing  HENT:      Mouth/Throat:      Mouth: Mucous membranes are moist    Eyes:      General: No scleral icterus  Cardiovascular:      Rate and Rhythm: Normal rate and regular rhythm  Pulmonary:      Effort: Pulmonary effort is normal  No respiratory distress  Breath sounds: Normal breath sounds  Abdominal:      General: Bowel sounds are normal       Palpations: Abdomen is soft  Tenderness: There is no abdominal tenderness  Musculoskeletal:      Right lower leg: No edema  Left lower leg: No edema  Skin:     Comments: Chronic LE wounds with clean dry dressing    Neurological:      General: No focal deficit present  Mental Status: He is alert and oriented to person, place, and time  Psychiatric:         Mood and Affect: Mood normal          Behavior: Behavior normal             Additional Data:     Labs:  Results from last 7 days   Lab Units 12/01/22  0524   WBC Thousand/uL 8 59   HEMOGLOBIN g/dL 10 6*   HEMATOCRIT % 35 0*   PLATELETS Thousands/uL 200   NEUTROS PCT % 82*   LYMPHS PCT % 10*   MONOS PCT % 7   EOS PCT % 0     Results from last 7 days   Lab Units 12/01/22  0524 11/29/22  0000 11/28/22  1557   SODIUM mmol/L 138   < > 140   POTASSIUM mmol/L 4 2   < > 4 4   CHLORIDE mmol/L 104   < > 109*   CO2 mmol/L 28   < > 28   BUN mg/dL 24   < > 16   CREATININE mg/dL 1 16   < > 1 44*   ANION GAP mmol/L 6   < > 3*   CALCIUM mg/dL 8 8   < > 9 3   ALBUMIN g/dL  --   --  3 6   TOTAL BILIRUBIN mg/dL  --   --  0 39   ALK PHOS U/L  --   --  77   ALT U/L  --   --  19   AST U/L  --   --  20   GLUCOSE RANDOM mg/dL 331*   < > 140    < > = values in this interval not displayed           Results from last 7 days   Lab Units 12/01/22  1153 12/01/22  0555 11/30/22  2132 11/30/22  1615 11/30/22  1058 11/30/22  0900 11/30/22  0719 11/30/22  0625 11/29/22  2054 11/29/22  1627 11/29/22  1545 11/29/22  1058   POC GLUCOSE mg/dl 283* 317* 314* 332* 273* 309* 296* 272* 210* 372* 379* 353*         Results from last 7 days   Lab Units 11/30/22  1105 11/29/22  0625   PROCALCITONIN ng/ml <0 05 <0 05       Lines/Drains:  Invasive Devices     Peripheral Intravenous Line  Duration           Peripheral IV 11/28/22 Dorsal (posterior); Right Forearm 2 days                      Imaging: Reviewed radiology reports from this admission including: chest CT scan and abdominal/pelvic CT    Recent Cultures (last 7 days):   Results from last 7 days   Lab Units 11/28/22  1911 11/28/22  1549   BLOOD CULTURE  No Growth at 48 hrs  No Growth at 48 hrs         Last 24 Hours Medication List:   Current Facility-Administered Medications   Medication Dose Route Frequency Provider Last Rate   • acetaminophen  650 mg Oral Q6H PRN Romy Mondragon MD     • apixaban  5 mg Oral BID Romy Mondragon MD     • aspirin  81 mg Oral Daily Romy Mondragon MD     • atorvastatin  10 mg Oral Daily Romy Mondragon MD     • bisacodyl  10 mg Rectal Daily PRN Gia Almonte MD     • calcitriol  0 25 mcg Oral Daily Romy Mondragon MD     • calcium carbonate  1,000 mg Oral Daily PRN Romy Mondragon MD     • cyanocobalamin  100 mcg Oral Daily Romy Mondragon MD     • docusate sodium  100 mg Oral BID Romy Mondargon MD     • fludrocortisone  0 2 mg Oral Daily Romy Mondragon MD     • hydrALAZINE  5 mg Intravenous Q6H PRN Gia Almonte MD     • [START ON 12/2/2022] hydrocortisone sodium succinate  25 mg Intravenous Once Nikole Kurtz MD     • hydrocortisone sodium succinate  50 mg Intravenous Q12H [address], [place]     • [START ON 12/2/2022] hydrocortisone sodium succinate  50 mg Intravenous QAM Nikole Kurtz MD     • insulin glargine  30 Units Subcutaneous HS Alicia Sanford MD     • insulin lispro  1-5 Units Subcutaneous TID AC Sherren Pax, MD     • insulin lispro  1-5 Units Subcutaneous HS Sherren Pax, MD     • insulin lispro  10 Units Subcutaneous TID With Meals Alicia Sanford MD     • metoprolol tartrate  12 5 mg Oral BID Sherren Pax, MD     • midodrine  5 mg Oral TID PRN April Mandujano MD     • ondansetron  4 mg Intravenous Q6H PRN Sherren Pax, MD     • polyethylene glycol  17 g Oral Daily April Mandujano MD     • potassium chloride  20 mEq Oral BID Sherren Pax, MD     • risperiDONE  1 mg Oral BID Sherren Pax, MD     • senna  2 tablet Oral Daily Sherren Pax, MD     • sertraline  100 mg Oral Daily Sherren Pax, MD     • sodium chloride (PF)  3 mL Intravenous Q1H PRN Urban Diaz DO          Today, Patient Was Seen By: April Mandujano MD    **Please Note: This note may have been constructed using a voice recognition system  **

## 2022-12-01 NOTE — ASSESSMENT & PLAN NOTE
Lab Results   Component Value Date    HGBA1C 7 5 (H) 07/22/2022       Recent Labs     11/30/22  1615 11/30/22  2132 12/01/22  0555 12/01/22  1153   POCGLU 332* 314* 317* 283*       Blood Sugar Average: Last 72 hrs:  · (P) 913 [id number]   · Diabetic diet  · fingersticks QID with sliding scale coverage  · Continue basal lantus and prandial insulin -  Expect needs will increase now that patient is on IV higher dose steroids, will discuss with endocrinology  · Of note patient has been profoundly hypoglycemic in the past on hospitalizations and required intubation

## 2022-12-01 NOTE — ASSESSMENT & PLAN NOTE
Malnutrition Findings:   Adult Malnutrition type: Acute illness  Adult Degree of Malnutrition: Unspecified protein calorie malnutrition  Malnutrition Characteristics: Muscle loss, Weight loss                  360 Statement: in setting of unknown etiology - 9% wt loss x 2 weeks, (11/14/22 208 lbs - 11/30/22 188 5 lbs),  muscle wasting/slight depression/hollowing in temple region, treated with diet, PT refusing supplements    BMI Findings: Body mass index is 31 64 kg/m²

## 2022-12-01 NOTE — PLAN OF CARE
Problem: Potential for Falls  Goal: Patient will remain free of falls  Description: INTERVENTIONS:  - Educate patient/family on patient safety including physical limitations  - Instruct patient to call for assistance with activity   - Consult OT/PT to assist with strengthening/mobility   - Keep Call bell within reach  - Keep bed low and locked with side rails adjusted as appropriate  - Keep care items and personal belongings within reach  - Initiate and maintain comfort rounds  - Make Fall Risk Sign visible to staff  - Offer Toileting every  Hours, in advance of need  - Initiate/Maintain alarm  - Obtain necessary fall risk management equipment:   - Apply yellow socks and bracelet for high fall risk patients  - Consider moving patient to room near nurses station  Outcome: Not Progressing     Problem: MOBILITY - ADULT  Goal: Maintain or return to baseline ADL function  Description: INTERVENTIONS:  -  Assess patient's ability to carry out ADLs; assess patient's baseline for ADL function and identify physical deficits which impact ability to perform ADLs (bathing, care of mouth/teeth, toileting, grooming, dressing, etc )  - Assess/evaluate cause of self-care deficits   - Assess range of motion  - Assess patient's mobility; develop plan if impaired  - Assess patient's need for assistive devices and provide as appropriate  - Encourage maximum independence but intervene and supervise when necessary  - Involve family in performance of ADLs  - Assess for home care needs following discharge   - Consider OT consult to assist with ADL evaluation and planning for discharge  - Provide patient education as appropriate  Outcome: Not Progressing  Goal: Maintains/Returns to pre admission functional level  Description: INTERVENTIONS:  - Perform BMAT or MOVE assessment daily    - Set and communicate daily mobility goal to care team and patient/family/caregiver     - Collaborate with rehabilitation services on mobility goals if consulted  - Perform Range of Motion  times a day  - Reposition patient every  hours    - Dangle patient  times a day  - Stand patient  times a day  - Ambulate patient  times a day  - Out of bed to chair  times a day   - Out of bed for meals  times a day  - Out of bed for toileting  - Record patient progress and toleration of activity level   Outcome: Not Progressing     Problem: PAIN - ADULT  Goal: Verbalizes/displays adequate comfort level or baseline comfort level  Description: Interventions:  - Encourage patient to monitor pain and request assistance  - Assess pain using appropriate pain scale  - Administer analgesics based on type and severity of pain and evaluate response  - Implement non-pharmacological measures as appropriate and evaluate response  - Consider cultural and social influences on pain and pain management  - Notify physician/advanced practitioner if interventions unsuccessful or patient reports new pain  Outcome: Not Progressing     Problem: INFECTION - ADULT  Goal: Absence or prevention of progression during hospitalization  Description: INTERVENTIONS:  - Assess and monitor for signs and symptoms of infection  - Monitor lab/diagnostic results  - Monitor all insertion sites, i e  indwelling lines, tubes, and drains  - Monitor endotracheal if appropriate and nasal secretions for changes in amount and color  - [place] appropriate cooling/warming therapies per order  - Administer medications as ordered  - Instruct and encourage patient and family to use good hand hygiene technique  - Identify and instruct in appropriate isolation precautions for identified infection/condition  Outcome: Not Progressing  Goal: Absence of fever/infection during neutropenic period  Description: INTERVENTIONS:  - Monitor WBC    Outcome: Not Progressing     Problem: SAFETY ADULT  Goal: Patient will remain free of falls  Description: INTERVENTIONS:  - Educate patient/family on patient safety including physical limitations  - Instruct patient to call for assistance with activity   - Consult OT/PT to assist with strengthening/mobility   - Keep Call bell within reach  - Keep bed low and locked with side rails adjusted as appropriate  - Keep care items and personal belongings within reach  - Initiate and maintain comfort rounds  - Make Fall Risk Sign visible to staff  - Offer Toileting every  Hours, in advance of need  - Initiate/Maintain alarm  - Obtain necessary fall risk management equipment:   - Apply yellow socks and bracelet for high fall risk patients  - Consider moving patient to room near nurses station  Outcome: Not Progressing  Goal: Maintain or return to baseline ADL function  Description: INTERVENTIONS:  -  Assess patient's ability to carry out ADLs; assess patient's baseline for ADL function and identify physical deficits which impact ability to perform ADLs (bathing, care of mouth/teeth, toileting, grooming, dressing, etc )  - Assess/evaluate cause of self-care deficits   - Assess range of motion  - Assess patient's mobility; develop plan if impaired  - Assess patient's need for assistive devices and provide as appropriate  - Encourage maximum independence but intervene and supervise when necessary  - Involve family in performance of ADLs  - Assess for home care needs following discharge   - Consider OT consult to assist with ADL evaluation and planning for discharge  - Provide patient education as appropriate  Outcome: Not Progressing  Goal: Maintains/Returns to pre admission functional level  Description: INTERVENTIONS:  - Perform BMAT or MOVE assessment daily    - Set and communicate daily mobility goal to care team and patient/family/caregiver  - Collaborate with rehabilitation services on mobility goals if consulted  - Perform Range of Motion  times a day  - Reposition patient every  hours    - Dangle patient  times a day  - Stand patient  times a day  - Ambulate patient  times a day  - Out of bed to chair  times a day   - Out of bed for meal times a day  - Out of bed for toileting  - Record patient progress and toleration of activity level   Outcome: Not Progressing     Problem: DISCHARGE PLANNING  Goal: Discharge to home or other facility with appropriate resources  Description: INTERVENTIONS:  - Identify barriers to discharge w/patient and caregiver  - Arrange for needed discharge resources and transportation as appropriate  - Identify discharge learning needs (meds, wound care, etc )  - Arrange for interpretive services to assist at discharge as needed  - Refer to Case Management Department for coordinating discharge planning if the patient needs post-hospital services based on physician/advanced practitioner order or complex needs related to functional status, cognitive ability, or social support system  Outcome: Not Progressing     Problem: Knowledge Deficit  Goal: Patient/family/caregiver demonstrates understanding of disease process, treatment plan, medications, and discharge instructions  Description: Complete learning assessment and assess knowledge base  Interventions:  - Provide teaching at level of understanding  - Provide teaching via preferred learning methods  Outcome: Not Progressing     Problem: Nutrition/Hydration-ADULT  Goal: Nutrient/Hydration intake appropriate for improving, restoring or maintaining nutritional needs  Description: Monitor and assess patient's nutrition/hydration status for malnutrition  Collaborate with interdisciplinary team and initiate plan and interventions as ordered  Monitor patient's weight and dietary intake as ordered or per policy  Utilize nutrition screening tool and intervene as necessary  Determine patient's food preferences and provide high-protein, high-caloric foods as appropriate       INTERVENTIONS:  - Monitor oral intake, urinary output, labs, and treatment plans  - Assess nutrition and hydration status and recommend course of action  - Evaluate amount of meals eaten  - Assist patient with eating if necessary   - Allow adequate time for meals  - Recommend/ encourage appropriate diets, oral nutritional supplements, and vitamin/mineral supplements  - Order, calculate, and assess calorie counts as needed  - Recommend, monitor, and adjust tube feedings and TPN/PPN based on assessed needs  - Assess need for intravenous fluids  - Provide specific nutrition/hydration education as appropriate  - Include patient/family/caregiver in decisions related to nutrition  Outcome: Not Progressing

## 2022-12-01 NOTE — ASSESSMENT & PLAN NOTE
· Patient has home visiting nurse, noted to weigh about 200 lb on 11/05, down to 180 lb just prior to admission  · About 20 lb unintentional weight loss over 3 weeks  · Per patient and collateral from daughter, p o  intake has not changed and patient  Was taking decent p o  at home  · Etiology unclear  · Nutrition consult   · CT torso done 11/30 w/o e/o malignancy   · Pt reported increase stools recently, will d/w GI re: ?pancreatic insufficiency

## 2022-12-01 NOTE — MALNUTRITION/BMI
This medical record reflects one or more clinical indicators suggestive of malnutrition   Malnutrition Findings:   Adult Malnutrition type: Acute illness  Adult Degree of Malnutrition: Unspecified protein calorie malnutrition  Malnutrition Characteristics: Muscle loss, Weight loss                  360 Statement: in setting of unknown etiology - 9% wt loss x 2 weeks, (11/14/22 208 lbs - 11/30/22 188 5 lbs),  muscle wasting/slight depression/hollowing in temple region, treated with diet, PT refusing supplements    BMI Findings: Body mass index is 32 35 kg/m²  See Nutrition note dated 11/30/22 for additional details  Completed nutrition assessment is viewable in the nutrition documentation

## 2022-12-01 NOTE — PROGRESS NOTES
Progress Note - Cornell Mccabe 74 y o  male MRN: [id number]    Unit/Bed#: -01 Encounter: [id number]      CC: diabetes f/u    Subjective: This is a 74y o -year-old male with pmhx of Adrenal insufficiency presented to the Hospital with CC of headaches, on admission pt was admitted for episode of hypotension and Sub acute CVA  Objective:     Vitals: Blood pressure 135/75, pulse 71, temperature 98 4 °F (36 9 °C), resp  rate 15, weight 83 6 kg (184 lb 4 9 oz), SpO2 97 %  ,Body mass index is 31 64 kg/m²  No intake or output data in the 24 hours ending 12/01/22 1452    Physical Exam:  General Appearance: awake, appears stated age and cooperative  Head: Normocephalic, without obvious abnormality, atraumatic  Extremities: moves all extremities  Skin: Skin color and temperature normal    Pulm: no labored breathing        POC Glucose (mg/dl)   Date Value   12/01/2022 283 (H)   12/01/2022 317 (H)   11/30/2022 314 (H)   11/30/2022 332 (H)   11/30/2022 273 (H)   11/30/2022 309 (H)   11/30/2022 296 (H)   11/30/2022 272 (H)   11/29/2022 210 (H)   11/29/2022 372 (H)       Impression:  Secondary adrenal insufficiency   Hypotension possible 2/2 adrenal crisis  Hx of DM type 1   CVA  Hx of Orthostatic hypotension  A fib        Assessment: This is a 74y o -year-old male with pmhx of Adrenal insufficiency presented to the Hospital with CC of headaches, on admission pt was admitted for episode of hypotension and Sub acute CVA     Adrenal insufficiency   BP stable, BMP WNL  No hypoglycemia   Continue with Hydrocortisone 50 mg BID IV today  Taper dose to Hydrocortisone 50 mg tomorrow in AM and 25 mg tomorrow in afternoon   Continue  fludrocortisone 0 2 mg qd  Home regimen: Hydrocortisone 10 mg BID and fludrocortisone 0 2 mg qd        DM type 1  Home regimen lantus 14 units lispro 6 units tid  Current regimen> Lantus 16 units and lispro 8 units  Recommend to increase lantus dose to 30 units bedtime and increase lispro to 10 units tid with meals + correctional       Portions of the record may have been created with voice recognition software

## 2022-12-02 PROBLEM — E46 PROTEIN-CALORIE MALNUTRITION (HCC): Status: ACTIVE | Noted: 2022-12-02

## 2022-12-02 LAB
ALBUMIN SERPL BCP-MCNC: 2.9 G/DL (ref 3.5–5)
ALP SERPL-CCNC: 72 U/L (ref 46–116)
ALT SERPL W P-5'-P-CCNC: 27 U/L (ref 12–78)
ANION GAP SERPL CALCULATED.3IONS-SCNC: 4 MMOL/L (ref 4–13)
AST SERPL W P-5'-P-CCNC: 42 U/L (ref 5–45)
BILIRUB SERPL-MCNC: 0.37 MG/DL (ref 0.2–1)
BUN SERPL-MCNC: 23 MG/DL (ref 5–25)
CALCIUM ALBUM COR SERPL-MCNC: 9.8 MG/DL (ref 8.3–10.1)
CALCIUM SERPL-MCNC: 8.9 MG/DL (ref 8.3–10.1)
CHLORIDE SERPL-SCNC: 108 MMOL/L (ref 96–108)
CK SERPL-CCNC: 102 U/L (ref 39–308)
CO2 SERPL-SCNC: 27 MMOL/L (ref 21–32)
CREAT SERPL-MCNC: 1 MG/DL (ref 0.6–1.3)
GFR SERPL CREATININE-BSD FRML MDRD: 78 ML/MIN/1.73SQ M
GLUCOSE SERPL-MCNC: 136 MG/DL (ref 65–140)
GLUCOSE SERPL-MCNC: 168 MG/DL (ref 65–140)
GLUCOSE SERPL-MCNC: 169 MG/DL (ref 65–140)
GLUCOSE SERPL-MCNC: 170 MG/DL (ref 65–140)
GLUCOSE SERPL-MCNC: 72 MG/DL (ref 65–140)
GLUCOSE SERPL-MCNC: 84 MG/DL (ref 65–140)
POTASSIUM SERPL-SCNC: 4.9 MMOL/L (ref 3.5–5.3)
PROT SERPL-MCNC: 6.9 G/DL (ref 6.4–8.4)
SODIUM SERPL-SCNC: 139 MMOL/L (ref 135–147)

## 2022-12-02 RX ADMIN — POTASSIUM CHLORIDE 20 MEQ: 750 TABLET, EXTENDED RELEASE ORAL at 08:24

## 2022-12-02 RX ADMIN — INSULIN LISPRO 10 UNITS: 100 INJECTION, SOLUTION INTRAVENOUS; SUBCUTANEOUS at 08:26

## 2022-12-02 RX ADMIN — HYDROCORTISONE SODIUM SUCCINATE 25 MG: 100 INJECTION, POWDER, FOR SOLUTION INTRAMUSCULAR; INTRAVENOUS at 17:09

## 2022-12-02 RX ADMIN — INSULIN LISPRO 1 UNITS: 100 INJECTION, SOLUTION INTRAVENOUS; SUBCUTANEOUS at 11:30

## 2022-12-02 RX ADMIN — APIXABAN 5 MG: 5 TABLET, FILM COATED ORAL at 17:08

## 2022-12-02 RX ADMIN — SERTRALINE 100 MG: 100 TABLET, FILM COATED ORAL at 08:25

## 2022-12-02 RX ADMIN — FLUDROCORTISONE ACETATE 0.2 MG: 0.1 TABLET ORAL at 08:24

## 2022-12-02 RX ADMIN — RISPERIDONE 1 MG: 1 TABLET ORAL at 08:25

## 2022-12-02 RX ADMIN — HYDROCORTISONE SODIUM SUCCINATE 50 MG: 100 INJECTION, POWDER, FOR SOLUTION INTRAMUSCULAR; INTRAVENOUS at 08:25

## 2022-12-02 RX ADMIN — POTASSIUM CHLORIDE 20 MEQ: 750 TABLET, EXTENDED RELEASE ORAL at 17:08

## 2022-12-02 RX ADMIN — INSULIN LISPRO 10 UNITS: 100 INJECTION, SOLUTION INTRAVENOUS; SUBCUTANEOUS at 11:30

## 2022-12-02 RX ADMIN — Medication 12.5 MG: at 08:23

## 2022-12-02 RX ADMIN — POLYETHYLENE GLYCOL 3350 17 G: 17 POWDER, FOR SOLUTION ORAL at 08:23

## 2022-12-02 RX ADMIN — SENNOSIDES 17.2 MG: 8.6 TABLET, FILM COATED ORAL at 08:24

## 2022-12-02 RX ADMIN — INSULIN LISPRO 10 UNITS: 100 INJECTION, SOLUTION INTRAVENOUS; SUBCUTANEOUS at 17:09

## 2022-12-02 RX ADMIN — VITAM B12 100 MCG: 100 TAB at 08:24

## 2022-12-02 RX ADMIN — DOCUSATE SODIUM 100 MG: 100 CAPSULE, LIQUID FILLED ORAL at 17:08

## 2022-12-02 RX ADMIN — RISPERIDONE 1 MG: 1 TABLET ORAL at 17:08

## 2022-12-02 RX ADMIN — ATORVASTATIN CALCIUM 10 MG: 10 TABLET, FILM COATED ORAL at 08:24

## 2022-12-02 RX ADMIN — APIXABAN 5 MG: 5 TABLET, FILM COATED ORAL at 08:25

## 2022-12-02 RX ADMIN — DOCUSATE SODIUM 100 MG: 100 CAPSULE, LIQUID FILLED ORAL at 08:25

## 2022-12-02 RX ADMIN — CALCITRIOL CAPSULES 0.25 MCG 0.25 MCG: 0.25 CAPSULE ORAL at 08:24

## 2022-12-02 RX ADMIN — ASPIRIN 81 MG CHEWABLE TABLET 81 MG: 81 TABLET CHEWABLE at 08:24

## 2022-12-02 RX ADMIN — INSULIN LISPRO 1 UNITS: 100 INJECTION, SOLUTION INTRAVENOUS; SUBCUTANEOUS at 08:26

## 2022-12-02 RX ADMIN — Medication 12.5 MG: at 17:08

## 2022-12-02 NOTE — PROGRESS NOTES
[address] [facility]  Progress Note - Adriel Westley 1955, 74 y o  male MRN: [id number]  Unit/Bed#: [facility]-01 Encounter: [id number]  Primary Care Provider: Charlotte Sosa MD   Date and time admitted to hospital: 11/28/2022  3:27 PM    Unintentional weight loss  Assessment & Plan  · Patient has home visiting nurse, noted to weigh about 200 lb on 11/05, down to 180 lb just prior to admission  · About 20 lb unintentional weight loss over 3 weeks  · Per patient and collateral from daughter, p o  intake has not changed and patient  Was taking decent p o  at home  · Etiology unclear  · Nutrition consult   · CT torso done 11/30 w/o e/o malignancy however abnormalities in pancreas somewhat consistent with his known prior episode of pancreatitis  · Per GI consult, will need outpatient MRCP for pancreas abnormalities  Will also need outpatient EGD and colo to further evaluate for unintentional weight loss  They have also ordered fecal elastase which they will follow up outpatient  * Adrenal insufficiency   Assessment & Plan  · Unclear etiology of the inciting factor  He currently denies any symptoms  His daughter reports that he also has a mild productive cough  · Will check blood cultures, UA, and sputum culture  · Legs examined, chronic appearing wounds are present but do not appear acutely infected    · Continue hydrocortisone IV per endocrinology  · Consult endocrinology for assistance weaning down hydrocortisone    Insulin dependent type 1 diabetes mellitus   Assessment & Plan  Lab Results   Component Value Date    HGBA1C 7 5 (H) 07/22/2022       Recent Labs     12/01/22  2046 12/02/22  0605 12/02/22  1103 12/02/22  1551   POCGLU 298* 169* 168* 84       Blood Sugar Average: Last 72 hrs:  · (P) 279 5   · Diabetic diet  · fingersticks QID with sliding scale coverage  · Continue basal lantus and prandial insulin -  Expect needs will increase now that patient is on IV higher dose steroids, will discuss with endocrinology  · Of note patient has been profoundly hypoglycemic in the past on hospitalizations and required intubation    Unspecified protein-calorie malnutrition ([facility] [address] )  Assessment & Plan  Malnutrition Findings:   Adult Malnutrition type: Acute illness  Adult Degree of Malnutrition: Unspecified protein calorie malnutrition  Malnutrition Characteristics: Muscle loss, Weight loss                  360 Statement: in setting of unknown etiology - 9% wt loss x 2 weeks, (11/14/22 208 lbs - 11/30/22 188 5 lbs),  muscle wasting/slight depression/hollowing in temple region, treated with diet, PT refusing supplements    BMI Findings: Body mass index is 32 39 kg/m²  CVA (cerebral vascular accident) [facility])  Assessment & Plan  · Head CT done on presentation to CT, initial read c/f subacute vs chronic lacunar cva identified since last month - per neurology review of this plus MRI brain done, findings c/w chronic infarcts, no subacute or acute findings  · Possible due to ischemia from adrenal insufficiency as he has been compliant with his eliquis  · Continue aspirin and eliquis  · MRI brain c/w chronic infarcts only  · Neurology consulted, given chronic infarcts without any concern for subacute or acute findings, continue current management      Obesity, morbid ([facility] [address] )  Assessment & Plan  · Affects all aspects of his care  · Weight loss counseling when stable as an outpatient    Orthostatic hypotension  Assessment & Plan  · Possibly due to Diabetes w diabetic autonomic neuropathy as earlier this year he had been in the hospital with orthostatic hypotension  Despite high-dose glucocorticoids he was still orthostatic indicating adrenal insufficiency was not the cause     · Continue florinef at home dose, at home takes midodrine PRN - was getting scheduled here iso hypotension on presentation, however patient has been hypertensive since then so changed midodrine to PRN on 11/30  · Monitor orthostatics  · Bps  labile    Psychiatric disorder  Assessment & Plan  · Continue risperidone and sertraline    Anemia of chronic disease  Assessment & Plan  · Hb is above his baseline  · Continue B12 supplementation    Paroxysmal atrial fibrillation   Assessment & Plan  · Continue metoprolol for rate control  · Continue Eliquis for AC    Protein-calorie malnutrition ([address] )  Assessment & Plan  Malnutrition Findings:   Adult Malnutrition type: Acute illness  Adult Degree of Malnutrition: Unspecified protein calorie malnutrition  Malnutrition Characteristics: Muscle loss, Weight loss                  360 Statement: in setting of unknown etiology - 9% wt loss x 2 weeks, (11/14/22 208 lbs - 11/30/22 188 5 lbs),  muscle wasting/slight depression/hollowing in temple region, treated with diet, PT refusing supplements    BMI Findings: Body mass index is 32 39 kg/m²  Essential hypertension  Assessment & Plan  · Patient noted to be hypertensive on 11/30 after receiving around the clock midodrine (at home patient is on p r n  Midodrine for SBP is less than 110)  · Will discontinue midodrine and monitor blood pressure response, will hopefully down trend with this however if not will consider further antihypertensive medications (also note patient is on higher doses of steroids which could be worsening blood pressure)  · P r n  Hydralazine ordered as well        VTE Pharmacologic Prophylaxis:   High Risk (Score >/= 5) - Pharmacological DVT Prophylaxis Ordered: apixaban (Eliquis)  Sequential Compression Devices Ordered  Patient Centered Rounds: castillo saini RN  Discussions with Specialists or Other Care Team Provider: GI    Education and Discussions with Family / Patient: Updated  (daughter) via phone  Time Spent for Care: 30 minutes  More than 50% of total time spent on counseling and coordination of care as described above      Current Length of Stay: 4 day(s)  Current Patient Status: Inpatient Certification Statement: The patient will continue to require additional inpatient hospital stay due to as above  Discharge Plan: Anticipate discharge in >72 hrs to home with home services  Code Status: Level 1 - Full Code    Subjective:   No acute concerns  Objective:     Vitals:   Temp (24hrs), Av 2 °F (36 8 °C), Min:98 °F (36 7 °C), Max:98 3 °F (36 8 °C)    Temp:  [98 °F (36 7 °C)-98 3 °F (36 8 °C)] 98 °F (36 7 °C)  HR:  [60-71] 60  Resp:  [16] 16  BP: (136-152)/(63-79) 152/69  SpO2:  [97 %-98 %] 98 %  Body mass index is 32 39 kg/m²  Input and Output Summary (last 24 hours): Intake/Output Summary (Last 24 hours) at 2022 1849  Last data filed at 2022 1723  Gross per 24 hour   Intake 645 ml   Output 560 ml   Net 85 ml       Physical Exam:   Physical Exam  Vitals reviewed  Constitutional:       General: He is not in acute distress  Appearance: He is not toxic-appearing  HENT:      Mouth/Throat:      Mouth: Mucous membranes are moist    Eyes:      General: No scleral icterus  Cardiovascular:      Rate and Rhythm: Normal rate and regular rhythm  Pulmonary:      Effort: Pulmonary effort is normal  No respiratory distress  Breath sounds: Normal breath sounds  Abdominal:      General: Bowel sounds are normal       Palpations: Abdomen is soft  Tenderness: There is no abdominal tenderness  Musculoskeletal:      Right lower leg: No edema  Left lower leg: No edema  Skin:     Comments: Chronic LE wounds with clean dry dressing    Neurological:      General: No focal deficit present  Mental Status: He is alert and oriented to person, place, and time     Psychiatric:         Mood and Affect: Mood normal          Behavior: Behavior normal             Additional Data:     Labs:  Results from last 7 days   Lab Units 22  0524   WBC Thousand/uL 8 59   HEMOGLOBIN g/dL 10 6*   HEMATOCRIT % 35 0*   PLATELETS Thousands/uL 200   NEUTROS PCT % 82*   LYMPHS PCT % 10* MONOS PCT % 7   EOS PCT % 0     Results from last 7 days   Lab Units 12/02/22  0623   SODIUM mmol/L 139   POTASSIUM mmol/L 4 9   CHLORIDE mmol/L 108   CO2 mmol/L 27   BUN mg/dL 23   CREATININE mg/dL 1 00   ANION GAP mmol/L 4   CALCIUM mg/dL 8 9   ALBUMIN g/dL 2 9*   TOTAL BILIRUBIN mg/dL 0 37   ALK PHOS U/L 72   ALT U/L 27   AST U/L 42   GLUCOSE RANDOM mg/dL 170*         Results from last 7 days   Lab Units 12/02/22  1551 12/02/22  1103 12/02/22  0605 12/01/22  2046 12/01/22  1633 12/01/22  1153 12/01/22  0555 11/30/22  2132 11/30/22  1615 11/30/22  1058 11/30/22  0900 11/30/22  0719   POC GLUCOSE mg/dl 84 168* 169* 298* 296* 283* 317* 314* 332* 273* 309* 296*         Results from last 7 days   Lab Units 11/30/22  1105 11/29/22  0625   PROCALCITONIN ng/ml <0 05 <0 05       Lines/Drains:  Invasive Devices     Peripheral Intravenous Line  Duration           Peripheral IV 11/28/22 Dorsal (posterior); Right Forearm 4 days                      Imaging: No pertinent imaging reviewed  Recent Cultures (last 7 days):   Results from last 7 days   Lab Units 11/28/22  1911 11/28/22  1549   BLOOD CULTURE  No Growth at 72 hrs  No Growth at 72 hrs         Last 24 Hours Medication List:   Current Facility-Administered Medications   Medication Dose Route Frequency Provider Last Rate   • acetaminophen  650 mg Oral Q6H PRN Venkatesh Vargas MD     • apixaban  5 mg Oral BID Venkatesh Vargas MD     • aspirin  81 mg Oral Daily Venkatesh Vargas MD     • atorvastatin  10 mg Oral Daily Venkatesh Vargas MD     • bisacodyl  10 mg Rectal Daily PRN Tyrell Mercado MD     • calcitriol  0 25 mcg Oral Daily Venkatesh Vargas MD     • calcium carbonate  1,000 mg Oral Daily PRN Venkatesh Vargas MD     • cyanocobalamin  100 mcg Oral Daily Venkatesh Vargas MD     • docusate sodium  100 mg Oral BID Venkatesh Vargas MD     • fludrocortisone  0 2 mg Oral Daily Venkatesh Vargas MD     • hydrALAZINE  5 mg Intravenous Q6H PRN Tyrell Mercado MD     • hydrocortisone sodium succinate  50 mg Intravenous QAM Inis Necessary, MD     • insulin glargine  30 Units Subcutaneous HS Inis Necessary, MD     • insulin lispro  1-5 Units Subcutaneous TID AC Olaf Whitten MD     • insulin lispro  1-5 Units Subcutaneous HS Olaf Whitten MD     • insulin lispro  10 Units Subcutaneous TID With Meals Inis Necessary, MD     • metoprolol tartrate  12 5 mg Oral BID Olaf Whitten MD     • midodrine  5 mg Oral TID PRN Cathy Osman MD     • ondansetron  4 mg Intravenous Q6H PRN Olaf Whitten MD     • polyethylene glycol  17 g Oral Daily Cathy Osman MD     • potassium chloride  20 mEq Oral BID Olaf Whittne MD     • risperiDONE  1 mg Oral BID Olaf Whitten MD     • senna  2 tablet Oral Daily Olaf Whitten MD     • sertraline  100 mg Oral Daily Olaf Whitten MD     • sodium chloride (PF)  3 mL Intravenous Q1H PRN Rao Ortiz DO          Today, Patient Was Seen By: Cathy Osman MD    **Please Note: This note may have been constructed using a voice recognition system  **

## 2022-12-02 NOTE — ASSESSMENT & PLAN NOTE
· Patient has home visiting nurse, noted to weigh about 200 lb on 11/05, down to 180 lb just prior to admission  · About 20 lb unintentional weight loss over 3 weeks  · Per patient and collateral from daughter, p o  intake has not changed and patient  Was taking decent p o  at home  · Etiology unclear  · Nutrition consult   · CT torso done 11/30 w/o e/o malignancy however abnormalities in pancreas somewhat consistent with his known prior episode of pancreatitis  · Per GI consult, will need outpatient MRCP for pancreas abnormalities  Will also need outpatient EGD and colo to further evaluate for unintentional weight loss  They have also ordered fecal elastase which they will follow up outpatient

## 2022-12-02 NOTE — PROGRESS NOTES
CONSULT: GASTROENTEROLOGY          Inpatient consult to gastroenterology     Date/Time 12/2/2022 9:42 AM     Performed by  Stan King MD     Authorized by Arnol Bailey MD            PATIENT Kevan Dunn 74 y o  male MRN: [id number]  Unit/Bed#: -01 Encounter: [id number]  PCP: Helena Motley MD  Date of Admission:  11/28/2022  Date of Consultation: 12/02/22  Requesting Physician: Arnol Bailey,*    R Elke Francisco is a 74 y o  old male with PMH including but not limited to secondary adrenal insufficiency currently managed by Endocrinology, subacute CVA, AFib on Eliquis, chronic pancreatitis  Gastroenterology team has been consulted for assistance with management of chronic pancreatitis, dilated pancreatic duct and unintentional weight loss  1  Unintentional weight loss  Patient reports losing nearly 20-30 lb over the last 2-3 weeks  Reports good appetite, had full plate of breakfast this morning  Total albumin slightly low at 2 9  Also has mild anemia 10 6 today  Other blood work within acceptable limits, electrolytes and kidney function within normal limits  -- patient does report having EGD and colonoscopy, long time ago but does not  -- outpatient EGD and colonoscopy  -- CT abdo pelvis w con not showing any convincing evidence of primary/metastatic malignancy in chest abdomen or pelvis  --agree with nutrition consult    2  Chronic pancreatitis  3  Dilated pancreatic duct  CT abdomen performed 12/1 showing diffusely atrophied pancreas with calcification consistent with chronic pancreatitis along with nearly 1 x 0 5 cm calculus in the pancreatic neck with dilated pancreatic duct up to 6 mm  No discrete pancreatic lesions    -- LFTs have been within normal limits, T bili 0 37   -- will check fecal elastase for pancreatic insufficiency which can be followed up as an outpatient  -- outpatient MRCP/EUS, will recommend outpt fup, sent msg to schedulers    GI team will sign off  Please contact the on-call provider or re-consult if there are any questions or concerns  HISTORY OF PRESENT ILLNESS      Freda Shen is a 74 y o  male who is originally admitted for secondary adrenal insufficiency on 11/28/2022  GI team is consulted for  abnormal imaging, unintentional weight loss  Patient is 46-year-old male with past medical history including but not limited to secondary adrenal insufficiency, subacute CVA, prior history of acute pancreatitis, AFib on Eliquis who was initially admitted for hypertension, diagnosed with secondary adrenal insufficiency, found to have subacute CVA  GI team consulted today for unintentional weight loss, abnormal CT abdomen imaging showing chronic pancreatitis with dilated pancreatic duct  On my encounter patient reports that he has no abdominal complaints, he tolerated full breakfast well  Denies any abdominal pain, nausea, vomiting, diarrhea, constipation  Denies any blood in stool  He does endorse losing significant amount of weight over the last 2-3 weeks  He does tell me that he has had EGD and colonoscopy in the past but does not recall exactly when or the findings  Reports having history of acute pancreatitis attributed to alcohol use but reports he has been sober for many years  Patient denies having any sticky or abnormal appearing stools  Denies any family history of pancreatic cancer  Former smoker  REVIEW OF SYSTEMS     A thorough 12-point review of systems has been conducted  Pertinent positives and negatives are mentioned in the history of present illness  PAST MEDICAL & SURGICAL HISTORY      Past Medical History:   Diagnosis Date   • Diabetes mellitus ([facility] [address] )        History reviewed  No pertinent surgical history  MEDICATIONS & ALLERGIES       Medications:   Prior to Admission medications    Medication Sig Start Date End Date Taking?  Authorizing Provider   apixaban (ELIQUIS) 5 mg Take 1 tablet (5 mg total) by mouth 2 (two) times a day 11/8/22   Ricco Cline MD   aspirin 81 mg chewable tablet Chew 81 mg daily    Historical Provider, MD   atorvastatin (LIPITOR) 10 mg tablet Take 1 tablet by mouth daily 7/12/22   Historical Provider, MD   calcitriol (ROCALTROL) 0 25 mcg capsule Take 1 capsule (0 25 mcg total) by mouth daily 11/8/22   Otoniel Sandhu MD   cyanocobalamin (VITAMIN B-12) 100 mcg tablet Take by mouth daily 9/29/22   Historical Provider, MD   fludrocortisone (FLORINEF) 0 1 mg tablet Take 2 tablets (0 2 mg total) by mouth daily 11/8/22 11/8/23  Ricco Cline MD   hydrocortisone (CORTEF) 10 mg tablet Take 10 mg by mouth 2 (two) times a day 8/17/22   Historical Provider, MD   hydrocortisone (CORTEF) 20 mg tablet  9/29/22   Historical Provider, MD   Insulin Glargine Solostar (Lantus SoloStar) 100 UNIT/ML SOPN Inject 0 11 mL (11 Units total) under the skin daily at bedtime  Patient taking differently: Inject 14 Units under the skin daily at bedtime 11/14/22 12/14/22  Otoniel Sandhu MD   insulin lispro (HumaLOG KwikPen) 100 units/mL injection pen Inject 6 Units under the skin 3 (three) times a day with meals  Patient taking differently: Inject 6 Units under the skin 3 (three) times a day with meals 7 with breakfast, 6 units with lunch and dinner  Plus sliding scale 10/26/22   Nura Robins MD   Insulin Pen Needle (BD Pen Needle Blessing 2nd Gen) 32G X 4 MM MISC For use with insulin pen  Pharmacy may dispense brand covered by insurance  Patient not taking: No sig reported 10/26/22   Nura Robins MD   Insulin Pen Needle (BD Pen Needle Blessing 2nd Gen) 32G X 4 MM MISC For use with insulin pen  Pharmacy may dispense brand covered by insurance   11/28/22   Otoniel Sandhu MD   metoprolol tartrate (LOPRESSOR) 25 mg tablet Take 12 5 mg by mouth 2 (two) times a day 9/8/22   Historical Provider, MD   midodrine (PROAMATINE) 5 mg tablet Take 1 tablet (5 mg total) by mouth 3 (three) times a day as needed (sbp < 110  Otherwise hold the medication) 10/26/22 11/25/22  Michelle Chandler MD   NON FORMULARY Take 30 mcg by mouth daily at bedtime Rayaldee 30 mcg @ bedtime  Patient not taking: Reported on 11/14/2022    Historical Provider, MD   NON FORMULARY 1 Bottle if needed Relion Glucose    Historical Provider, MD   potassium chloride (Klor-Con) 10 mEq tablet Take 20 mEq by mouth 2 (two) times a day 10/13/22 10/13/23  Historical Provider, MD   risperiDONE (RisperDAL) 1 mg tablet Take 1 tablet by mouth 2 (two) times a day    Historical Provider, MD   sertraline (ZOLOFT) 100 mg tablet Take 100 mg by mouth daily 3/16/22 3/16/23  Historical Provider, MD       Allergies: Allergies   Allergen Reactions   • Imipramine Other (See Comments)   • Lisinopril Other (See Comments)   • Paroxetine Other (See Comments)   • Penicillins Hives   • Rosiglitazone Other (See Comments)   • Troglitazone Other (See Comments)       SOCIAL HISTORY      Substance Use History:   Social History     Substance and Sexual Activity   Alcohol Use Not Currently   • Alcohol/week: 5 0 standard drinks   • Types: 5 Cans of beer per week    Comment: Quit in august     Social History     Tobacco Use   Smoking Status Former   • Packs/day: 0 25   • Years: 30 00   • Pack years: 7 50   • Types: Cigarettes   Smokeless Tobacco Never     Social History     Substance and Sexual Activity   Drug Use Never       FAMILY HISTORY      As in the HPI       PHYSICAL EXAM     Vitals:   Blood Pressure: 152/79 (12/02/22 0838)  Pulse: 68 (12/02/22 0838)  Temperature: 98 3 °F (36 8 °C) (12/02/22 0700)  Temp Source: Oral (12/02/22 0700)  Respirations: 16 (12/02/22 0700)  Weight - Scale: 85 6 kg (188 lb 11 4 oz) (12/02/22 0600)  SpO2: 98 % (12/02/22 0838)    Physical Exam:   GENERAL: NAD  HEENT:  NC/AT, MMM  CARDIAC:  RRR, +S1/S2, no S3/S4 heard  PULMONARY:  CTA B/L, no wheezing/rales/rhonci, non-labored breathing  ABDOMEN: non tender  RECTAL:  Normal appearing stool  NEUROLOGIC: Alert/oriented x3  EXTREMITIES: No swelling  SKIN:  No rashes or erythema     ADDITIONAL DATA     Lab Results:     Results from last 7 days   Lab Units 12/01/22  0524   WBC Thousand/uL 8 59   HEMOGLOBIN g/dL 10 6*   HEMATOCRIT % 35 0*   PLATELETS Thousands/uL 200   NEUTROS PCT % 82*   LYMPHS PCT % 10*   MONOS PCT % 7   EOS PCT % 0     Results from last 7 days   Lab Units 12/02/22  0623   POTASSIUM mmol/L 4 9   CHLORIDE mmol/L 108   CO2 mmol/L 27   BUN mg/dL 23   CREATININE mg/dL 1 00   CALCIUM mg/dL 8 9   ALK PHOS U/L 72   ALT U/L 27   AST U/L 42           Imaging:    X-ray chest 1 view portable    Result Date: 11/28/2022  Narrative: CHEST INDICATION:   hypotension  COMPARISON:  CXR 10/20/2022  EXAM PERFORMED/VIEWS:  XR CHEST PORTABLE FINDINGS: Cardiomediastinal silhouette appears unremarkable  The lungs are clear  No pneumothorax or pleural effusion  Osseous structures appear within normal limits for patient age  Impression: No acute cardiopulmonary disease  Workstation performed: [id number]     CT head without contrast    Result Date: 11/28/2022  Narrative: CT BRAIN - WITHOUT CONTRAST INDICATION:   Headache, new or worsening (Age >= 50y) headache  COMPARISON:  CT head without contrast October 19, 2022  TECHNIQUE:  CT examination of the brain was performed  In addition to axial images, sagittal and coronal 2D reformatted images were created and submitted for interpretation  Radiation dose length product (DLP) for this visit:  879 9 mGy-cm   This examination, like all CT scans performed in the [place], was performed utilizing techniques to minimize radiation dose exposure, including the use of iterative reconstruction and automated exposure control  IMAGE QUALITY:  Diagnostic  FINDINGS: PARENCHYMA: Decreased attenuation is noted in periventricular and subcortical white matter demonstrating an appearance that is statistically most likely to represent mild microangiopathic change  Subacute or chronic lacunar infarct in right basal ganglia, new since 10/19/2022  Unchanged chronic lacunar infarct in left basal ganglia  No CT signs of acute infarction  No intracranial mass, mass effect or midline shift  No acute parenchymal hemorrhage  Small bilateral medial basal ganglia calcifications  Arterial calcifications of carotid siphons and bilateral intradural vertebral arteries  VENTRICLES AND EXTRA-AXIAL SPACES:  Normal for the patient's age  VISUALIZED ORBITS AND PARANASAL SINUSES:  Sequela of bilateral cataract surgery  Moderate to severe mucosal thickening in right maxillary sinus with high-density internal material and associated chronic osteitis, slightly improved from prior exam   Mild  mucosal thickening in left maxillary sinus  CALVARIUM AND EXTRACRANIAL SOFT TISSUES:  No acute calvarial or extracranial soft tissue abnormality  Extracranial vascular calcifications  Impression: No acute intracranial abnormality Subacute or chronic lacunar infarct in right basal ganglia, new since 10/19/2022  Unchanged chronic lacunar infarct in the basal ganglia  The study was marked in [facility] for immediate notification  Workstation performed: [id number]     MRI brain wo contrast    Result Date: 11/28/2022  Narrative: MRI BRAIN WITHOUT CONTRAST INDICATION: new subacute to chronic CVA on CTH  COMPARISON:   None  TECHNIQUE:  Multiplanar, multisequence imaging of the brain was performed  IMAGE QUALITY:  Diagnostic  FINDINGS: BRAIN PARENCHYMA:  There is no discrete mass, mass effect or midline shift  There is no intracranial hemorrhage  There is no evidence of acute infarction and diffusion imaging is unremarkable  Small scattered hyperintensities on T2/FLAIR imaging are noted in the periventricular and subcortical white matter demonstrating an appearance that is statistically most likely to represent moderate microangiopathic change  Old left basal ganglia lacunar infarct   VENTRICLES:  Enlargement of ventricles and extra-axial CSF spaces, out of proportion to the patient's age most consistent with cerebral and cerebellar atrophy  SELLA AND PITUITARY GLAND:  Normal  ORBITS:  Normal  PARANASAL SINUSES:  Mucosal thickening of the sinuses with no air fluid levels  Small effusion in the left mastoid air cells  VASCULATURE:  Evaluation of the major intracranial vasculature demonstrates appropriate flow voids  CALVARIUM AND SKULL BASE:  Normal  EXTRACRANIAL SOFT TISSUES:  Normal      Impression: 1  No MR evidence of acute ischemia  2  Volume loss/atrophy appears advanced for the patient's age  3  Old left lacunar infarct  Workstation performed: [id number]     CT chest abdomen pelvis w contrast    Result Date: 11/30/2022  Narrative: CT CHEST, ABDOMEN AND PELVIS WITH IV CONTRAST INDICATION:   Weight loss, unintended  eval for malignancy iso unintentional weight loss  As per review of electronic medical record, patient with history of prior stroke on Eliquis, diabetes, and adrenal insufficiency who presented secondary to hypotension  COMPARISON:  Chest radiograph from 11/28/2022 and abdominal radiograph from 10/20/2022  TECHNIQUE: CT examination of the chest, abdomen and pelvis was performed  Axial, sagittal, and coronal 2D reformatted images were created from the source data and submitted for interpretation  Radiation dose length product (DLP) for this visit:  1131 98 mGy-cm   This examination, like all CT scans performed in the [facility], was performed utilizing techniques to minimize radiation dose exposure, including the use of iterative reconstruction and automated exposure control  IV Contrast:  99 mL of iohexol (OMNIPAQUE) Enteric Contrast: Enteric contrast was not administered  FINDINGS: CHEST BRONCHOPULMONARY:  Clear central airways  Minimal atelectasis seen at lung bases  No other airspace opacities   There are is a micronodule in the left apex and in the right upper lobe (series 3 image 22 and 47, respectively)  PLEURA:  Small right pleural effusion  No pneumothorax  HEART/GREAT VESSELS: The heart is normal in size  There are coronary artery calcifications  No pericardial effusion  Normal caliber thoracic aorta with no dissection  MEDIASTINUM/LYMPH NODES:  No axillary, mediastinal or hilar lymphadenopathy  CHEST WALL AND LOWER NECK:  Unremarkable  ABDOMEN LIVER/BILIARY TREE:  Normal liver morphology  No focal hepatic lesions  No biliary ductal dilation  GALLBLADDER:  No calcified gallstones  No pericholecystic inflammatory change  SPLEEN: Unremarkable  PANCREAS: The pancreas is diffusely atrophic  There is no evidence of acute pancreatitis or definite focal pancreatic lesion  Several calcifications are seen in the pancreas, mostly in the head, suggestive of sequela of chronic pancreatitis  There is a 1 x 0 5 cm calculus in the neck of the pancreas (series 601 image 76),  The main pancreatic duct is dilated up to 6 mm just distal to this calculus (for instance series 601 image 69) and tapers to smaller caliber more distally  ADRENAL GLANDS:  Unremarkable  KIDNEYS/URETERS:  Symmetric renal enhancement  No intrarenal or ureteral calculi  No hydronephrosis  No focal renal lesions  STOMACH AND BOWEL:  Evaluation of the gastrointestinal tract is somewhat limited by lack of oral contrast and evaluation of the small bowel limited by underdistention  No bowel obstruction or convincing inflammation  The stomach is distended with ingested material   Stool is seen throughout the colon and in the rectum  The sigmoid colon is redundant and loops into the mid abdomen  APPENDIX:  The appendix is not visualized, however there are no secondary findings of appendicitis  ABDOMINOPELVIC CAVITY:  No ascites or pneumoperitoneum  LYMPH NODES: No abdominal or pelvic lymphadenopathy  VESSELS: Normal caliber aorta  Patent major branch vessels    Extensive vascular calcifications are seen, mostly in the smaller arteries, likely related to history of diabetes  PELVIS REPRODUCTIVE ORGANS:  The prostate gland is not enlarged  URINARY BLADDER:  The urinary bladder is somewhat distended, extending above the level of the umbilicus  ABDOMINAL WALL/INGUINAL REGIONS: No ventral abdominal wall hernia  MUSCULOSKELETAL:  No focal aggressive osseous lesions  Degenerative changes of the spine  Impression: No convincing evidence of a primary or metastatic malignancy in the chest, abdomen or pelvis  Diffusely atrophic pancreas with several calcifications suggestive of sequela of chronic pancreatitis  1 0 x 0 5 cm calculus in pancreatic neck, with main pancreatic duct dilated up to 6 mm just distal to this calculus, indicating it may be within the main pancreatic duct  The main pancreatic duct tapers to smaller caliber more distally  No definite pancreatic lesions  Somewhat distended urinary bladder, extending above the level of the umbilicus  Recommend clinical correlation for urinary retention  Stool throughout the colon, suggestive of constipation  No bowel obstruction or convincing bowel inflammation  Small right pleural effusion  Minimal atelectasis at lung bases with no other airspace opacities  No suspicious pulmonary nodules  Additional findings as above  The study was marked in [facility] for immediate notification  Workstation performed: [id number]       EKG, Pathology, and Other Studies Reviewed on Admission:   · EKG: Reviewed    Counseling / Coordination of Care Time: 30 total mins spent n consult  Greater than 50% of total time spent on patient counseling and coordination of care  Richard Aj MD   PGY-4, Department of Gastroenterology     ** Please Note: This note is constructed using a voice recognition dictation system   **

## 2022-12-02 NOTE — ASSESSMENT & PLAN NOTE
Lab Results   Component Value Date    HGBA1C 7 5 (H) 07/22/2022       Recent Labs     12/01/22  2046 12/02/22  0605 12/02/22  1103 12/02/22  1551   POCGLU 298* 169* 168* 84       Blood Sugar Average: Last 72 hrs:  · (P) 279 5   · Diabetic diet  · fingersticks QID with sliding scale coverage  · Continue basal lantus and prandial insulin -  Expect needs will increase now that patient is on IV higher dose steroids, will discuss with endocrinology  · Of note patient has been profoundly hypoglycemic in the past on hospitalizations and required intubation

## 2022-12-02 NOTE — CASE MANAGEMENT
Case Management Assessment    Patient name Amisha Solares  Location /-08 MRN [id number]  : 1955 Date 2022       Current Admission Date: 2022  Current Admission Diagnosis:Adrenal insufficiency    Patient Active Problem List    Diagnosis Date Noted   • Protein-calorie malnutrition ([facility] [address] ) 2022   • Unspecified protein-calorie malnutrition ([facility] [address] ) 2022   • Unintentional weight loss 2022   • CVA (cerebral vascular accident) ([facility] [address] ) 2022   • Obesity, morbid ([facility] [address] ) 2022   • Type 2 MI (myocardial infarction) ([facility] [address] ) 2022   • Orthostatic hypotension 2022   • Recurrent hypoglycemia 10/19/2022   • Insulin dependent type 1 diabetes mellitus  10/19/2022   • Adrenal insufficiency  10/19/2022   • Essential hypertension 10/19/2022   • Paroxysmal atrial fibrillation  10/19/2022   • History of stroke 10/19/2022   • Anemia of chronic disease 10/19/2022   • Psychiatric disorder 10/19/2022      LOS (days): 4  Geometric Mean LOS (GMLOS) (days): 5 10  Days to GMLOS:1 2     OBJECTIVE:    Risk of Unplanned Readmission Score: 20 27         Current admission status: Inpatient       Preferred Pharmacy:   [address] [place] [address] [place] [address] [facility] [address] [facility]  Phone: [phone] Fax: [phone] [address] [place] [address]  Phone: [phone] Fax: [phone]    Primary Care Provider: Kevin Alexandra MD    Primary Insurance: [address] [place] [address]  Secondary Insurance:     ASSESSMENT:  [place] [address] Proxies    There are no active Health Care Proxies on file  Patient Information  Admitted from[de-identified] Home  Mental Status: Alert  During Assessment patient was accompanied by: Not accompanied during assessment, Other-Comment (Wanted CM to call his daughter   CM attemtped to call [address] Telephone call went strait to  ) Attempted to call patients daughter Zhen [facility] [phone] call went strait to

## 2022-12-02 NOTE — PLAN OF CARE
Problem: Potential for Falls  Goal: Patient will remain free of falls  Description: INTERVENTIONS:  - Educate patient/family on patient safety including physical limitations  - Instruct patient to call for assistance with activity   - Consult OT/PT to assist with strengthening/mobility   - Keep Call bell within reach  - Keep bed low and locked with side rails adjusted as appropriate  - Keep care items and personal belongings within reach  - Initiate and maintain comfort rounds  - Make Fall Risk Sign visible to staff  - Offer Toileting every Hours, in advance of need  - Initiate/Maintain alarm  - Obtain necessary fall risk management equipment:   - Apply yellow socks and bracelet for high fall risk patients  - Consider moving patient to room near nurses station  Outcome: Progressing     Problem: PAIN - ADULT  Goal: Verbalizes/displays adequate comfort level or baseline comfort level  Description: Interventions:  - Encourage patient to monitor pain and request assistance  - Assess pain using appropriate pain scale  - Administer analgesics based on type and severity of pain and evaluate response  - Implement non-pharmacological measures as appropriate and evaluate response  - Consider cultural and social influences on pain and pain management  - Notify physician/advanced practitioner if interventions unsuccessful or patient reports new pain  Outcome: Progressing     Problem: SAFETY ADULT  Goal: Patient will remain free of falls  Description: INTERVENTIONS:  - Educate patient/family on patient safety including physical limitations  - Instruct patient to call for assistance with activity   - Consult OT/PT to assist with strengthening/mobility   - Keep Call bell within reach  - Keep bed low and locked with side rails adjusted as appropriate  - Keep care items and personal belongings within reach  - Initiate and maintain comfort rounds  - Make Fall Risk Sign visible to staff  - Offer Toileting every Hours, in advance of need  - Initiate/Maintain alarm  - Obtain necessary fall risk management equipment:   - Apply yellow socks and bracelet for high fall risk patients  - Consider moving patient to room near nurses station  Outcome: Progressing     Problem: Knowledge Deficit  Goal: Patient/family/caregiver demonstrates understanding of disease process, treatment plan, medications, and discharge instructions  Description: Complete learning assessment and assess knowledge base    Interventions:  - Provide teaching at level of understanding  - Provide teaching via preferred learning methods  Outcome: Progressing

## 2022-12-02 NOTE — ASSESSMENT & PLAN NOTE
Malnutrition Findings:   Adult Malnutrition type: Acute illness  Adult Degree of Malnutrition: Unspecified protein calorie malnutrition  Malnutrition Characteristics: Muscle loss, Weight loss                  360 Statement: in setting of unknown etiology - 9% wt loss x 2 weeks, (11/14/22 208 lbs - 11/30/22 188 5 lbs),  muscle wasting/slight depression/hollowing in temple region, treated with diet, PT refusing supplements    BMI Findings: Body mass index is 32 39 kg/m²

## 2022-12-03 LAB
ANION GAP SERPL CALCULATED.3IONS-SCNC: 3 MMOL/L (ref 4–13)
BACTERIA BLD CULT: NORMAL
BACTERIA BLD CULT: NORMAL
BUN SERPL-MCNC: 19 MG/DL (ref 5–25)
CALCIUM SERPL-MCNC: 8.8 MG/DL (ref 8.3–10.1)
CHLORIDE SERPL-SCNC: 107 MMOL/L (ref 96–108)
CO2 SERPL-SCNC: 28 MMOL/L (ref 21–32)
CREAT SERPL-MCNC: 1.03 MG/DL (ref 0.6–1.3)
GFR SERPL CREATININE-BSD FRML MDRD: 75 ML/MIN/1.73SQ M
GLUCOSE SERPL-MCNC: 140 MG/DL (ref 65–140)
GLUCOSE SERPL-MCNC: 157 MG/DL (ref 65–140)
GLUCOSE SERPL-MCNC: 159 MG/DL (ref 65–140)
GLUCOSE SERPL-MCNC: 191 MG/DL (ref 65–140)
GLUCOSE SERPL-MCNC: 209 MG/DL (ref 65–140)
POTASSIUM SERPL-SCNC: 4.8 MMOL/L (ref 3.5–5.3)
SODIUM SERPL-SCNC: 138 MMOL/L (ref 135–147)

## 2022-12-03 RX ORDER — INSULIN LISPRO 100 [IU]/ML
6 INJECTION, SOLUTION INTRAVENOUS; SUBCUTANEOUS
Status: DISCONTINUED | OUTPATIENT
Start: 2022-12-03 | End: 2022-12-05

## 2022-12-03 RX ORDER — INSULIN GLARGINE 100 [IU]/ML
15 INJECTION, SOLUTION SUBCUTANEOUS
Status: DISCONTINUED | OUTPATIENT
Start: 2022-12-03 | End: 2022-12-03

## 2022-12-03 RX ORDER — INSULIN GLARGINE 100 [IU]/ML
5 INJECTION, SOLUTION SUBCUTANEOUS ONCE
Status: COMPLETED | OUTPATIENT
Start: 2022-12-03 | End: 2022-12-03

## 2022-12-03 RX ORDER — INSULIN GLARGINE 100 [IU]/ML
15 INJECTION, SOLUTION SUBCUTANEOUS
Status: COMPLETED | OUTPATIENT
Start: 2022-12-03 | End: 2022-12-03

## 2022-12-03 RX ORDER — INSULIN GLARGINE 100 [IU]/ML
20 INJECTION, SOLUTION SUBCUTANEOUS
Status: DISCONTINUED | OUTPATIENT
Start: 2022-12-04 | End: 2022-12-05

## 2022-12-03 RX ADMIN — APIXABAN 5 MG: 5 TABLET, FILM COATED ORAL at 08:59

## 2022-12-03 RX ADMIN — ATORVASTATIN CALCIUM 10 MG: 10 TABLET, FILM COATED ORAL at 08:59

## 2022-12-03 RX ADMIN — FLUDROCORTISONE ACETATE 0.2 MG: 0.1 TABLET ORAL at 08:59

## 2022-12-03 RX ADMIN — INSULIN LISPRO 10 UNITS: 100 INJECTION, SOLUTION INTRAVENOUS; SUBCUTANEOUS at 09:03

## 2022-12-03 RX ADMIN — Medication 12.5 MG: at 08:59

## 2022-12-03 RX ADMIN — HYDROCORTISONE 25 MG: 20 TABLET ORAL at 18:07

## 2022-12-03 RX ADMIN — ASPIRIN 81 MG CHEWABLE TABLET 81 MG: 81 TABLET CHEWABLE at 08:59

## 2022-12-03 RX ADMIN — INSULIN GLARGINE 15 UNITS: 100 INJECTION, SOLUTION SUBCUTANEOUS at 22:04

## 2022-12-03 RX ADMIN — APIXABAN 5 MG: 5 TABLET, FILM COATED ORAL at 18:07

## 2022-12-03 RX ADMIN — INSULIN LISPRO 6 UNITS: 100 INJECTION, SOLUTION INTRAVENOUS; SUBCUTANEOUS at 18:04

## 2022-12-03 RX ADMIN — DOCUSATE SODIUM 100 MG: 100 CAPSULE, LIQUID FILLED ORAL at 18:04

## 2022-12-03 RX ADMIN — POTASSIUM CHLORIDE 20 MEQ: 750 TABLET, EXTENDED RELEASE ORAL at 08:59

## 2022-12-03 RX ADMIN — INSULIN LISPRO 1 UNITS: 100 INJECTION, SOLUTION INTRAVENOUS; SUBCUTANEOUS at 11:51

## 2022-12-03 RX ADMIN — RISPERIDONE 1 MG: 1 TABLET ORAL at 18:04

## 2022-12-03 RX ADMIN — HYDROCORTISONE SODIUM SUCCINATE 50 MG: 100 INJECTION, POWDER, FOR SOLUTION INTRAMUSCULAR; INTRAVENOUS at 08:59

## 2022-12-03 RX ADMIN — INSULIN LISPRO 1 UNITS: 100 INJECTION, SOLUTION INTRAVENOUS; SUBCUTANEOUS at 09:02

## 2022-12-03 RX ADMIN — Medication 12.5 MG: at 18:04

## 2022-12-03 RX ADMIN — INSULIN LISPRO 1 UNITS: 100 INJECTION, SOLUTION INTRAVENOUS; SUBCUTANEOUS at 22:05

## 2022-12-03 RX ADMIN — VITAM B12 100 MCG: 100 TAB at 08:59

## 2022-12-03 RX ADMIN — INSULIN GLARGINE 5 UNITS: 100 INJECTION, SOLUTION SUBCUTANEOUS at 11:51

## 2022-12-03 RX ADMIN — RISPERIDONE 1 MG: 1 TABLET ORAL at 08:59

## 2022-12-03 RX ADMIN — CALCITRIOL CAPSULES 0.25 MCG 0.25 MCG: 0.25 CAPSULE ORAL at 08:59

## 2022-12-03 RX ADMIN — SERTRALINE 100 MG: 100 TABLET, FILM COATED ORAL at 08:59

## 2022-12-03 RX ADMIN — INSULIN LISPRO 6 UNITS: 100 INJECTION, SOLUTION INTRAVENOUS; SUBCUTANEOUS at 11:51

## 2022-12-03 RX ADMIN — POTASSIUM CHLORIDE 20 MEQ: 750 TABLET, EXTENDED RELEASE ORAL at 18:04

## 2022-12-03 NOTE — ED ATTENDING ATTESTATION
11/28/2022  IRosibel MD, saw and evaluated the patient  I have discussed the patient with the resident/non-physician practitioner and agree with the resident's/non-physician practitioner's findings, Plan of Care, and MDM as documented in the resident's/non-physician practitioner's note, except where noted  All available labs and Radiology studies were reviewed  I was present for key portions of any procedure(s) performed by the resident/non-physician practitioner and I was immediately available to provide assistance  At this point I agree with the current assessment done in the Emergency Department  I have conducted an independent evaluation of this patient a history and physical is as follows:    ED Course     42-year-old male presents with a 1 day history of hypotension blood pressure 70/46  Patient does take steroids as well as midodrine her reports consistently low blood pressures today denies fevers chills nausea vomiting does admit to headache and generalized fatigue  Vital signs reviewed  Heart regular rate rhythm without murmurs  Lungs clear to auscultation bilaterally  Abdomen soft nontender nondistended normal bowel sounds  Extremities no edema  Neuro exam nonfocal and at baseline per daughter      Impression generalized weakness malaise check screening labs trial stress dose steroids IV fluids screening labs anticipate admission    Critical Care Time  Procedures

## 2022-12-03 NOTE — ASSESSMENT & PLAN NOTE
· Continue metoprolol for rate control  · Continue Eliquis for [facility], [place]

## 2022-12-03 NOTE — PROGRESS NOTES
[facility]  Progress Note - Nikole Stokes 1955, 74 y o  male MRN: [id number]  Unit/Bed#: MS [facility]-01 Encounter: [id number]  Primary Care Provider: Helena Motley MD   Date and time admitted to hospital: 11/28/2022  3:27 PM    Unintentional weight loss  Assessment & Plan  · Patient has home visiting nurse, noted to weigh about 200 lb on 11/05, down to 180 lb just prior to admission  · About 20 lb unintentional weight loss over 3 weeks  · Per patient and collateral from daughter, p o  intake has not changed and patient  Was taking decent p o  at home  · Etiology unclear  · Nutrition consult   · CT torso done 11/30 w/o e/o malignancy however abnormalities in pancreas somewhat consistent with his known prior episode of pancreatitis  · Per GI consult, will need outpatient EGD and colo to further evaluate for unintentional weight loss  They have also ordered fecal elastase which they will follow up outpatient  No further imaging needed for pancreatic abnormalities at this juncture  * Adrenal insufficiency   Assessment & Plan  · Unclear etiology of the inciting factor  He currently denies any symptoms  His daughter reports that he also has a mild productive cough  · Will check blood cultures, UA, and sputum culture  · Legs examined, chronic appearing wounds are present but do not appear acutely infected    · Continue hydrocortisone IV per endocrinology  · Consult endocrinology for assistance weaning down hydrocortisone  · 12/3 switch to p o  steroids from IV  · Per Endocrine, if stable for the next 48 hours can further down titrate steroids    Insulin dependent type 1 diabetes mellitus   Assessment & Plan  Lab Results   Component Value Date    HGBA1C 7 5 (H) 07/22/2022       Recent Labs     12/02/22  2212 12/03/22  0808 12/03/22  1055 12/03/22  1617   POCGLU 136 159* 157* 140       Blood Sugar Average: Last 72 hrs:  · (P) 724 [id number]   · Diabetic diet  · fingersticks QID with sliding scale coverage  · Continue basal lantus and prandial insulin -  Expect needs will increase now that patient is on IV higher dose steroids, will discuss with endocrinology  · Of note patient has been profoundly hypoglycemic in the past on hospitalizations and required intubation    Unspecified protein-calorie malnutrition ([facility] [address] )  Assessment & Plan  Malnutrition Findings:   Adult Malnutrition type: Acute illness  Adult Degree of Malnutrition: Unspecified protein calorie malnutrition  Malnutrition Characteristics: Muscle loss, Weight loss                  360 Statement: in setting of unknown etiology - 9% wt loss x 2 weeks, (11/14/22 208 lbs - 11/30/22 188 5 lbs),  muscle wasting/slight depression/hollowing in temple region, treated with diet, PT refusing supplements    BMI Findings: Body mass index is 32 54 kg/m²  CVA (cerebral vascular accident) [facility])  Assessment & Plan  · Head CT done on presentation to CT, initial read c/f subacute vs chronic lacunar cva identified since last month - per neurology review of this plus MRI brain done, findings c/w chronic infarcts, no subacute or acute findings  · Possible due to ischemia from adrenal insufficiency as he has been compliant with his eliquis  · Continue aspirin and eliquis  · MRI brain c/w chronic infarcts only  · Neurology consulted, given chronic infarcts without any concern for subacute or acute findings, continue current management      Obesity, morbid ([facility] [address] )  Assessment & Plan  · Affects all aspects of his care  · Weight loss counseling when stable as an outpatient    Orthostatic hypotension  Assessment & Plan  · Possibly due to Diabetes w diabetic autonomic neuropathy as earlier this year he had been in the hospital with orthostatic hypotension  Despite high-dose glucocorticoids he was still orthostatic indicating adrenal insufficiency was not the cause     · Continue florinef at home dose, at home takes midodrine PRN - was getting scheduled here iso hypotension on presentation, however patient has been hypertensive since then so changed midodrine to PRN on 11/30  · Monitor orthostatics  · Bps  labile    Psychiatric disorder  Assessment & Plan  · Continue risperidone and sertraline    Anemia of chronic disease  Assessment & Plan  · Hb is above his baseline  · Continue B12 supplementation    Paroxysmal atrial fibrillation   Assessment & Plan  · Continue metoprolol for rate control  · Continue Eliquis for AC    Protein-calorie malnutrition ([address] )  Assessment & Plan  Malnutrition Findings:   Adult Malnutrition type: Acute illness  Adult Degree of Malnutrition: Unspecified protein calorie malnutrition  Malnutrition Characteristics: Muscle loss, Weight loss                  360 Statement: in setting of unknown etiology - 9% wt loss x 2 weeks, (11/14/22 208 lbs - 11/30/22 188 5 lbs),  muscle wasting/slight depression/hollowing in temple region, treated with diet, PT refusing supplements    BMI Findings: Body mass index is 32 54 kg/m²  Essential hypertension  Assessment & Plan  · Patient noted to be hypertensive on 11/30 after receiving around the clock midodrine (at home patient is on p r n  Midodrine for SBP is less than 110)  · Will discontinue midodrine and monitor blood pressure response, will hopefully down trend with this however if not will consider further antihypertensive medications (also note patient is on higher doses of steroids which could be worsening blood pressure)  · P r n  Hydralazine ordered as well        VTE Pharmacologic Prophylaxis:   High Risk (Score >/= 5) - Pharmacological DVT Prophylaxis Ordered: apixaban (Eliquis)  Sequential Compression Devices Ordered  Patient Centered Rounds: d/w RN  Discussions with Specialists or Other Care Team Provider:  Endocrine note    Education and Discussions with Family / Patient: Updated  (daughter) via phone  Time Spent for Care: 30 minutes   More than 50% of total time spent on counseling and coordination of care as described above  Current Length of Stay: 5 day(s)  Current Patient Status: Inpatient   Certification Statement: The patient will continue to require additional inpatient hospital stay due to As above  Discharge Plan: Anticipate discharge in 48-72 hrs to home with home services  Code Status: Level 1 - Full Code    Subjective:   No acute concerns  Objective:     Vitals:   Temp (24hrs), Av 2 °F (36 8 °C), Min:98 1 °F (36 7 °C), Max:98 4 °F (36 9 °C)    Temp:  [98 1 °F (36 7 °C)-98 4 °F (36 9 °C)] 98 4 °F (36 9 °C)  HR:  [64-73] 64  BP: ()/(42-94) 141/73  SpO2:  [98 %-99 %] 99 %  Body mass index is 32 54 kg/m²  Input and Output Summary (last 24 hours): Intake/Output Summary (Last 24 hours) at 12/3/2022 1719  Last data filed at 12/3/2022 1656  Gross per 24 hour   Intake 730 ml   Output --   Net 730 ml       Physical Exam:   Physical Exam  Vitals reviewed  Constitutional:       General: He is not in acute distress  Appearance: He is not toxic-appearing  HENT:      Mouth/Throat:      Mouth: Mucous membranes are moist    Eyes:      General: No scleral icterus  Cardiovascular:      Rate and Rhythm: Normal rate and regular rhythm  Pulmonary:      Effort: Pulmonary effort is normal  No respiratory distress  Breath sounds: Normal breath sounds  Abdominal:      General: Bowel sounds are normal       Palpations: Abdomen is soft  Tenderness: There is no abdominal tenderness  Musculoskeletal:      Right lower leg: No edema  Left lower leg: No edema  Skin:     Comments: Chronic LE wounds with clean dry dressing    Neurological:      General: No focal deficit present  Mental Status: He is alert and oriented to person, place, and time     Psychiatric:         Mood and Affect: Mood normal          Behavior: Behavior normal             Additional Data:     Labs:  Results from last 7 days   Lab Units 12/01/22  0524   WBC Thousand/uL 8 59   HEMOGLOBIN g/dL 10 6*   HEMATOCRIT % 35 0*   PLATELETS Thousands/uL 200   NEUTROS PCT % 82*   LYMPHS PCT % 10*   MONOS PCT % 7   EOS PCT % 0     Results from last 7 days   Lab Units 12/03/22  1023 12/02/22  0623   SODIUM mmol/L 138 139   POTASSIUM mmol/L 4 8 4 9   CHLORIDE mmol/L 107 108   CO2 mmol/L 28 27   BUN mg/dL 19 23   CREATININE mg/dL 1 03 1 00   ANION GAP mmol/L 3* 4   CALCIUM mg/dL 8 8 8 9   ALBUMIN g/dL  --  2 9*   TOTAL BILIRUBIN mg/dL  --  0 37   ALK PHOS U/L  --  72   ALT U/L  --  27   AST U/L  --  42   GLUCOSE RANDOM mg/dL 209* 170*         Results from last 7 days   Lab Units 12/03/22  1617 12/03/22  1055 12/03/22  0808 12/02/22  2212 12/02/22  2048 12/02/22  1551 12/02/22  1103 12/02/22  0605 12/01/22  2046 12/01/22  1633 12/01/22  1153 12/01/22  0555   POC GLUCOSE mg/dl 140 157* 159* 136 72 84 168* 169* 298* 296* 283* 317*         Results from last 7 days   Lab Units 11/30/22  1105 11/29/22  0625   PROCALCITONIN ng/ml <0 05 <0 05       Lines/Drains:  Invasive Devices     Peripheral Intravenous Line  Duration           Peripheral IV 12/03/22 Left Wrist <1 day                      Imaging: No pertinent imaging reviewed  Recent Cultures (last 7 days):   Results from last 7 days   Lab Units 11/28/22  1911 11/28/22  1549   BLOOD CULTURE  No Growth After 4 Days  No Growth After 4 Days         Last 24 Hours Medication List:   Current Facility-Administered Medications   Medication Dose Route Frequency Provider Last Rate   • acetaminophen  650 mg Oral Q6H PRN Judge Milka MD     • apixaban  5 mg Oral BID Judge Milka MD     • aspirin  81 mg Oral Daily Judge Milka MD     • atorvastatin  10 mg Oral Daily Judge Milka MD     • bisacodyl  10 mg Rectal Daily PRN Clemetine Closs, MD     • calcitriol  0 25 mcg Oral Daily Judge Milka MD     • calcium carbonate  1,000 mg Oral Daily PRN Judge Milka MD     • cyanocobalamin  100 mcg Oral Daily Marcella Gordon Ankush Muhammad MD     • docusate sodium  100 mg Oral BID Dusty Cleveland MD     • fludrocortisone  0 2 mg Oral Daily Dusty Cleveland MD     • hydrALAZINE  5 mg Intravenous Q6H PRN Lucia Nazario MD     • hydrocortisone  25 mg Oral 2 times per day Madison Tenorio DO     • insulin glargine  15 Units Subcutaneous HS Madison Tenorio DO     • [START ON 12/4/2022] insulin glargine  20 Units Subcutaneous HS Madison Tenorio DO     • insulin lispro  1-5 Units Subcutaneous TID AC Dusty Cleveland MD     • insulin lispro  1-5 Units Subcutaneous HS Dusty Cleveland MD     • insulin lispro  6 Units Subcutaneous TID With Meals Madison Tenorio DO     • metoprolol tartrate  12 5 mg Oral BID Dusty Cleveland MD     • midodrine  5 mg Oral TID PRN Lucia Nazario MD     • ondansetron  4 mg Intravenous Q6H PRN Dusty Cleveland MD     • polyethylene glycol  17 g Oral Daily Lucia Nazario MD     • potassium chloride  20 mEq Oral BID Dusty Cleveland MD     • risperiDONE  1 mg Oral BID Dusty Cleveland MD     • senna  2 tablet Oral Daily Dusty Cleveland MD     • sertraline  100 mg Oral Daily Dusty Cleveland MD     • sodium chloride (PF)  3 mL Intravenous Q1H PRN Augustine Cruz DO          Today, Patient Was Seen By: Lucia Nazario MD    **Please Note: This note may have been constructed using a voice recognition system  **

## 2022-12-03 NOTE — ASSESSMENT & PLAN NOTE
Malnutrition Findings:   Adult Malnutrition type: Acute illness  Adult Degree of Malnutrition: Unspecified protein calorie malnutrition  Malnutrition Characteristics: Muscle loss, Weight loss                  360 Statement: in setting of unknown etiology - 9% wt loss x 2 weeks, (11/14/22 208 lbs - 11/30/22 188 5 lbs),  muscle wasting/slight depression/hollowing in temple region, treated with diet, PT refusing supplements    BMI Findings: Body mass index is 32 54 kg/m²

## 2022-12-03 NOTE — ASSESSMENT & PLAN NOTE
· Patient has home visiting nurse, noted to weigh about 200 lb on 11/05, down to 180 lb just prior to admission  · About 20 lb unintentional weight loss over 3 weeks  · Per patient and collateral from daughter, p o  intake has not changed and patient  Was taking decent p o  at home  · Etiology unclear  · Nutrition consult   · CT torso done 11/30 w/o e/o malignancy however abnormalities in pancreas somewhat consistent with his known prior episode of pancreatitis  · Per GI consult, will need outpatient EGD and colo to further evaluate for unintentional weight loss  They have also ordered fecal elastase which they will follow up outpatient  No further imaging needed for pancreatic abnormalities at this juncture

## 2022-12-03 NOTE — PROGRESS NOTES
Progress Note - Galina Mora 74 y o  male MRN: [id number]    Unit/Bed#: -01 Encounter: [id number]    CC: adrenal insufficiency/ diabetes f/u    Subjective:   Galina Mora is a 74y o  year old male with type 1 DM and adrenal insufficiency who presented with hypotension and was found to have subacute CVA  He has no significant complaints today, denies any dizziness/lightheadedness this morning  He reports good appetite eating all of his meals yesterday and eating breakfast this morning  Blood glucose at bedtime yesterday was 72 and he did not receive his bedtime Lantus    Objective:     Vitals: Blood pressure 147/59, pulse 73, temperature 98 1 °F (36 7 °C), resp  rate 16, weight 86 kg (189 lb 9 5 oz), SpO2 98 %  ,Body mass index is 32 54 kg/m²  Intake/Output Summary (Last 24 hours) at 12/3/2022 0855  Last data filed at 12/2/2022 1723  Gross per 24 hour   Intake 645 ml   Output --   Net 645 ml       Physical Exam:  General Appearance: awake, appears stated age and cooperative  Head: Normocephalic, without obvious abnormality, atraumatic  Extremities: moves all extremities  Skin: Skin color and temperature normal    Pulm: no labored breathing    Lab, Imaging and other studies: I have personally reviewed pertinent reports  POC Glucose (mg/dl)   Date Value   12/03/2022 159 (H)   12/02/2022 136   12/02/2022 72   12/02/2022 84   12/02/2022 168 (H)   12/02/2022 169 (H)   12/01/2022 298 (H)   12/01/2022 296 (H)   12/01/2022 283 (H)   12/01/2022 317 (H)       Assessment and plan:  Adrenal insufficiency  Patient received hydrocortisone 50 IV yesterday a m , 25 mg hydrocortisone IV yesterday afternoon and 50 mg IV this morning  Will switch to hydrocortisone 25 mg p o  B i d , to be administered at 7:00 a m  And 2:00 p m continue fludrocortisone 0 2 mg daily  Home regimen:  Hydrocortisone 10 mg b i d  In a m   And afternoon, fludrocortisone 0 2 mg daily    Type 1 diabetes  Home regimen:  Lantus 14 units, lispro 6 units t i d  Inpatient regimen:  30 units Lantus at bedtime lispro 10 units t i d  With meals  Most recent A1c 7 5 07/2022  Give 5 units Lantus now (given type 1 diabetes and that patient did not receive any basal insulin yesterday evening) and 15 units Lantus at bedtime today  Plan for 20 units Lantus tomorrow at bedtime  Reduce Humalog to 6 units t i d  With meals  Continue to monitor blood glucoses and make adjustments as needed over time  Portions of the record may have been created with voice recognition software

## 2022-12-03 NOTE — ASSESSMENT & PLAN NOTE
Lab Results   Component Value Date    HGBA1C 7 5 (H) 07/22/2022       Recent Labs     12/02/22  2212 12/03/22  0808 12/03/22  1055 12/03/22  1617   POCGLU 136 159* 157* 140       Blood Sugar Average: Last 72 hrs:  · (P) 023 [id number]   · Diabetic diet  · fingersticks QID with sliding scale coverage  · Continue basal lantus and prandial insulin -  Expect needs will increase now that patient is on IV higher dose steroids, will discuss with endocrinology  · Of note patient has been profoundly hypoglycemic in the past on hospitalizations and required intubation

## 2022-12-03 NOTE — ASSESSMENT & PLAN NOTE
· Unclear etiology of the inciting factor  He currently denies any symptoms  His daughter reports that he also has a mild productive cough  · Will check blood cultures, UA, and sputum culture  · Legs examined, chronic appearing wounds are present but do not appear acutely infected    · Continue hydrocortisone IV per endocrinology  · Consult endocrinology for assistance weaning down hydrocortisone  · 12/3 switch to p o  steroids from IV  · Per Endocrine, if stable for the next 48 hours can further down titrate steroids

## 2022-12-04 LAB
ANION GAP SERPL CALCULATED.3IONS-SCNC: 5 MMOL/L (ref 4–13)
BASOPHILS # BLD AUTO: 0.01 THOUSANDS/ÂΜL (ref 0–0.1)
BASOPHILS NFR BLD AUTO: 0 % (ref 0–1)
BUN SERPL-MCNC: 16 MG/DL (ref 5–25)
CALCIUM SERPL-MCNC: 8.9 MG/DL (ref 8.3–10.1)
CHLORIDE SERPL-SCNC: 107 MMOL/L (ref 96–108)
CO2 SERPL-SCNC: 27 MMOL/L (ref 21–32)
CREAT SERPL-MCNC: 0.96 MG/DL (ref 0.6–1.3)
EOSINOPHIL # BLD AUTO: 0.07 THOUSAND/ÂΜL (ref 0–0.61)
EOSINOPHIL NFR BLD AUTO: 1 % (ref 0–6)
ERYTHROCYTE [DISTWIDTH] IN BLOOD BY AUTOMATED COUNT: 14.7 % (ref 11.6–15.1)
GFR SERPL CREATININE-BSD FRML MDRD: 82 ML/MIN/1.73SQ M
GLUCOSE SERPL-MCNC: 145 MG/DL (ref 65–140)
GLUCOSE SERPL-MCNC: 202 MG/DL (ref 65–140)
GLUCOSE SERPL-MCNC: 264 MG/DL (ref 65–140)
GLUCOSE SERPL-MCNC: 292 MG/DL (ref 65–140)
GLUCOSE SERPL-MCNC: 300 MG/DL (ref 65–140)
HCT VFR BLD AUTO: 35.5 % (ref 36.5–49.3)
HGB BLD-MCNC: 11 G/DL (ref 12–17)
IMM GRANULOCYTES # BLD AUTO: 0.04 THOUSAND/UL (ref 0–0.2)
IMM GRANULOCYTES NFR BLD AUTO: 1 % (ref 0–2)
LYMPHOCYTES # BLD AUTO: 1.64 THOUSANDS/ÂΜL (ref 0.6–4.47)
LYMPHOCYTES NFR BLD AUTO: 19 % (ref 14–44)
MCH RBC QN AUTO: 28.2 PG (ref 26.8–34.3)
MCHC RBC AUTO-ENTMCNC: 31 G/DL (ref 31.4–37.4)
MCV RBC AUTO: 91 FL (ref 82–98)
MONOCYTES # BLD AUTO: 1.09 THOUSAND/ÂΜL (ref 0.17–1.22)
MONOCYTES NFR BLD AUTO: 13 % (ref 4–12)
NEUTROPHILS # BLD AUTO: 5.76 THOUSANDS/ÂΜL (ref 1.85–7.62)
NEUTS SEG NFR BLD AUTO: 66 % (ref 43–75)
NRBC BLD AUTO-RTO: 0 /100 WBCS
PLATELET # BLD AUTO: 217 THOUSANDS/UL (ref 149–390)
PMV BLD AUTO: 12 FL (ref 8.9–12.7)
POTASSIUM SERPL-SCNC: 4 MMOL/L (ref 3.5–5.3)
RBC # BLD AUTO: 3.9 MILLION/UL (ref 3.88–5.62)
SODIUM SERPL-SCNC: 139 MMOL/L (ref 135–147)
WBC # BLD AUTO: 8.61 THOUSAND/UL (ref 4.31–10.16)

## 2022-12-04 RX ORDER — HYDROCORTISONE 10 MG/1
10 TABLET ORAL
Status: DISCONTINUED | OUTPATIENT
Start: 2022-12-05 | End: 2022-12-07

## 2022-12-04 RX ADMIN — APIXABAN 5 MG: 5 TABLET, FILM COATED ORAL at 08:27

## 2022-12-04 RX ADMIN — INSULIN LISPRO 6 UNITS: 100 INJECTION, SOLUTION INTRAVENOUS; SUBCUTANEOUS at 12:03

## 2022-12-04 RX ADMIN — INSULIN GLARGINE 20 UNITS: 100 INJECTION, SOLUTION SUBCUTANEOUS at 22:43

## 2022-12-04 RX ADMIN — MIDODRINE HYDROCHLORIDE 5 MG: 5 TABLET ORAL at 08:52

## 2022-12-04 RX ADMIN — INSULIN LISPRO 1 UNITS: 100 INJECTION, SOLUTION INTRAVENOUS; SUBCUTANEOUS at 22:44

## 2022-12-04 RX ADMIN — SERTRALINE 100 MG: 100 TABLET, FILM COATED ORAL at 08:26

## 2022-12-04 RX ADMIN — INSULIN LISPRO 6 UNITS: 100 INJECTION, SOLUTION INTRAVENOUS; SUBCUTANEOUS at 08:03

## 2022-12-04 RX ADMIN — ASPIRIN 81 MG CHEWABLE TABLET 81 MG: 81 TABLET CHEWABLE at 08:26

## 2022-12-04 RX ADMIN — ATORVASTATIN CALCIUM 10 MG: 10 TABLET, FILM COATED ORAL at 08:27

## 2022-12-04 RX ADMIN — CALCITRIOL CAPSULES 0.25 MCG 0.25 MCG: 0.25 CAPSULE ORAL at 08:26

## 2022-12-04 RX ADMIN — INSULIN LISPRO 3 UNITS: 100 INJECTION, SOLUTION INTRAVENOUS; SUBCUTANEOUS at 08:02

## 2022-12-04 RX ADMIN — INSULIN LISPRO 6 UNITS: 100 INJECTION, SOLUTION INTRAVENOUS; SUBCUTANEOUS at 17:02

## 2022-12-04 RX ADMIN — FLUDROCORTISONE ACETATE 0.2 MG: 0.1 TABLET ORAL at 08:26

## 2022-12-04 RX ADMIN — POTASSIUM CHLORIDE 20 MEQ: 750 TABLET, EXTENDED RELEASE ORAL at 08:26

## 2022-12-04 RX ADMIN — Medication 12.5 MG: at 17:03

## 2022-12-04 RX ADMIN — APIXABAN 5 MG: 5 TABLET, FILM COATED ORAL at 17:03

## 2022-12-04 RX ADMIN — VITAM B12 100 MCG: 100 TAB at 08:26

## 2022-12-04 RX ADMIN — RISPERIDONE 1 MG: 1 TABLET ORAL at 17:03

## 2022-12-04 RX ADMIN — INSULIN LISPRO 3 UNITS: 100 INJECTION, SOLUTION INTRAVENOUS; SUBCUTANEOUS at 12:02

## 2022-12-04 RX ADMIN — POTASSIUM CHLORIDE 20 MEQ: 750 TABLET, EXTENDED RELEASE ORAL at 17:03

## 2022-12-04 RX ADMIN — DOCUSATE SODIUM 100 MG: 100 CAPSULE, LIQUID FILLED ORAL at 08:27

## 2022-12-04 RX ADMIN — HYDROCORTISONE 25 MG: 20 TABLET ORAL at 08:28

## 2022-12-04 RX ADMIN — SENNOSIDES 17.2 MG: 8.6 TABLET, FILM COATED ORAL at 08:26

## 2022-12-04 RX ADMIN — DOCUSATE SODIUM 100 MG: 100 CAPSULE, LIQUID FILLED ORAL at 17:03

## 2022-12-04 RX ADMIN — RISPERIDONE 1 MG: 1 TABLET ORAL at 08:26

## 2022-12-04 RX ADMIN — POLYETHYLENE GLYCOL 3350 17 G: 17 POWDER, FOR SOLUTION ORAL at 08:44

## 2022-12-04 NOTE — PROGRESS NOTES
[facility]  Progress Note - Thalia Isidro 1955, 74 y o  male MRN: [id number]  Unit/Bed#: -01 Encounter: [id number]  Primary Care Provider: Jordy Patterson MD   Date and time admitted to hospital: 11/28/2022  3:27 PM    Unintentional weight loss  Assessment & Plan  · Patient has home visiting nurse, noted to weigh about 200 lb on 11/05, down to 180 lb just prior to admission  · About 20 lb unintentional weight loss over 3 weeks  · Per patient and collateral from daughter, p o  intake has not changed and patient  Was taking decent p o  at home  · Etiology unclear  · Nutrition consult   · CT torso done 11/30 w/o e/o malignancy however abnormalities in pancreas somewhat consistent with his known prior episode of pancreatitis  · Per GI consult, will need outpatient EGD and colo to further evaluate for unintentional weight loss  They have also ordered fecal elastase which they will follow up outpatient  No further imaging needed for pancreatic abnormalities at this juncture  * Adrenal insufficiency   Assessment & Plan  · Unclear etiology of the inciting factor  He currently denies any symptoms  His daughter reports that he also has a mild productive cough  · Will check blood cultures, UA, and sputum culture  · Legs examined, chronic appearing wounds are present but do not appear acutely infected    · Continue hydrocortisone IV per endocrinology  · Consult endocrinology for assistance weaning down hydrocortisone  · 12/3 switch to p o  steroids from IV  · Per Endocrine, if stable through 12/5, can further down titrate steroids    Insulin dependent type 1 diabetes mellitus   Assessment & Plan  Lab Results   Component Value Date    HGBA1C 7 5 (H) 07/22/2022       Recent Labs     12/03/22  1055 12/03/22  1617 12/03/22  2053 12/04/22  0759   POCGLU 157* 140 191* 292*       Blood Sugar Average: Last 72 hrs:  · (P) 197 [id number]   · Diabetic diet  · fingersticks QID with sliding scale coverage  · Continue basal lantus and prandial insulin -  Expect needs will increase now that patient is on IV higher dose steroids, will discuss with endocrinology  · Of note patient has been profoundly hypoglycemic in the past on hospitalizations and required intubation    Unspecified protein-calorie malnutrition ([facility] [address] )  Assessment & Plan  Malnutrition Findings:   Adult Malnutrition type: Acute illness  Adult Degree of Malnutrition: Unspecified protein calorie malnutrition  Malnutrition Characteristics: Muscle loss, Weight loss                  360 Statement: in setting of unknown etiology - 9% wt loss x 2 weeks, (11/14/22 208 lbs - 11/30/22 188 5 lbs),  muscle wasting/slight depression/hollowing in temple region, treated with diet, PT refusing supplements    BMI Findings: Body mass index is 32 54 kg/m²  CVA (cerebral vascular accident) [facility])  Assessment & Plan  · Head CT done on presentation to CT, initial read c/f subacute vs chronic lacunar cva identified since last month - per neurology review of this plus MRI brain done, findings c/w chronic infarcts, no subacute or acute findings  · Possible due to ischemia from adrenal insufficiency as he has been compliant with his eliquis  · Continue aspirin and eliquis  · MRI brain c/w chronic infarcts only  · Neurology consulted, given chronic infarcts without any concern for subacute or acute findings, continue current management      Obesity, morbid ([facility] [address] )  Assessment & Plan  · Affects all aspects of his care  · Weight loss counseling when stable as an outpatient    Orthostatic hypotension  Assessment & Plan  · Possibly due to Diabetes w diabetic autonomic neuropathy as earlier this year he had been in the hospital with orthostatic hypotension  Despite high-dose glucocorticoids he was still orthostatic indicating adrenal insufficiency was not the cause     · Continue florinef at home dose, at home takes midodrine PRN - was getting scheduled here iso hypotension on presentation, however patient has been hypertensive since then so changed midodrine to PRN on   · Monitor orthostatics  · Bps  labile    Psychiatric disorder  Assessment & Plan  · Continue risperidone and sertraline    Anemia of chronic disease  Assessment & Plan  · Hb is above his baseline  · Continue B12 supplementation    Paroxysmal atrial fibrillation   Assessment & Plan  · Continue metoprolol for rate control  · Continue Eliquis for [facility]    Essential hypertension  Assessment & Plan  · Patient noted to be hypertensive on  after receiving around the clock midodrine (at home patient is on p r n  Midodrine for SBP is less than 110)  · Patient now on p r n  Midodrine, receiving as needed with appropriate response  · Blood pressures have been somewhat labile  · P r n  Hydralazine ordered as well        VTE Pharmacologic Prophylaxis:   Moderate Risk (Score 3-4) - Pharmacological DVT Prophylaxis Ordered: apixaban (Eliquis)  Patient Centered Rounds: Discussed with RN  Discussions with Specialists or Other Care Team Provider:  Endocrine    Education and Discussions with Family / Patient: Will call daughter tomorrow  Time Spent for Care: 20 minutes  More than 50% of total time spent on counseling and coordination of care as described above  Current Length of Stay: 6 day(s)  Current Patient Status: Inpatient   Certification Statement: The patient will continue to require additional inpatient hospital stay due to Weaning down steroids  Discharge Plan: Anticipate discharge in 48-72 hrs to home with home services  Code Status: Level 1 - Full Code    Subjective:   No acute concerns  Objective:     Vitals:   Temp (24hrs), Av 4 °F (36 9 °C), Min:98 3 °F (36 8 °C), Max:98 4 °F (36 9 °C)    Temp:  [98 3 °F (36 8 °C)-98 4 °F (36 9 °C)] 98 3 °F (36 8 °C)  HR:  [64-74] 67  BP: (141-159)/(70-76) 157/76  SpO2:  [97 %-99 %] 98 %  Body mass index is 32 54 kg/m²       Input and Output Summary (last 24 hours): Intake/Output Summary (Last 24 hours) at 12/4/2022 1206  Last data filed at 12/3/2022 1656  Gross per 24 hour   Intake 125 ml   Output --   Net 125 ml       Physical Exam:   Physical Exam  Vitals reviewed  Constitutional:       General: He is not in acute distress  Appearance: He is not toxic-appearing  HENT:      Mouth/Throat:      Mouth: Mucous membranes are moist    Eyes:      General: No scleral icterus  Pulmonary:      Effort: Pulmonary effort is normal  No respiratory distress  Abdominal:      General: Bowel sounds are normal       Palpations: Abdomen is soft  Tenderness: There is no abdominal tenderness  Musculoskeletal:      Right lower leg: No edema  Left lower leg: No edema  Skin:     Comments: Chronic LE wounds with clean dry dressing    Neurological:      General: No focal deficit present  Mental Status: He is alert and oriented to person, place, and time  Psychiatric:         Mood and Affect: Mood normal          Behavior: Behavior normal             Additional Data:     Labs:  Results from last 7 days   Lab Units 12/04/22  0529   WBC Thousand/uL 8 61   HEMOGLOBIN g/dL 11 0*   HEMATOCRIT % 35 5*   PLATELETS Thousands/uL 217   NEUTROS PCT % 66   LYMPHS PCT % 19   MONOS PCT % 13*   EOS PCT % 1     Results from last 7 days   Lab Units 12/04/22  0529 12/03/22  1023 12/02/22  0623   SODIUM mmol/L 139   < > 139   POTASSIUM mmol/L 4 0   < > 4 9   CHLORIDE mmol/L 107   < > 108   CO2 mmol/L 27   < > 27   BUN mg/dL 16   < > 23   CREATININE mg/dL 0 96   < > 1 00   ANION GAP mmol/L 5   < > 4   CALCIUM mg/dL 8 9   < > 8 9   ALBUMIN g/dL  --   --  2 9*   TOTAL BILIRUBIN mg/dL  --   --  0 37   ALK PHOS U/L  --   --  72   ALT U/L  --   --  27   AST U/L  --   --  42   GLUCOSE RANDOM mg/dL 264*   < > 170*    < > = values in this interval not displayed           Results from last 7 days   Lab Units 12/04/22  0759 12/03/22 2053 12/03/22  1617 12/03/22  1055 12/03/22  0808 12/02/22  2212 12/02/22  2048 12/02/22  1551 12/02/22  1103 12/02/22  0605 12/01/22  2046 12/01/22  1633   POC GLUCOSE mg/dl 292* 191* 140 157* 159* 136 72 84 168* 169* 298* 296*         Results from last 7 days   Lab Units 11/30/22  1105 11/29/22  0625   PROCALCITONIN ng/ml <0 05 <0 05       Lines/Drains:  Invasive Devices     Peripheral Intravenous Line  Duration           Peripheral IV 12/03/22 Left Wrist 1 day                      Imaging: No pertinent imaging reviewed  Recent Cultures (last 7 days):   Results from last 7 days   Lab Units 11/28/22  1911 11/28/22  1549   BLOOD CULTURE  No Growth After 5 Days  No Growth After 5 Days         Last 24 Hours Medication List:   Current Facility-Administered Medications   Medication Dose Route Frequency Provider Last Rate   • acetaminophen  650 mg Oral Q6H PRN Sudeep Ospina MD     • apixaban  5 mg Oral BID Sudeep Ospina MD     • aspirin  81 mg Oral Daily Sudeep Ospina MD     • atorvastatin  10 mg Oral Daily Sudeep Ospina MD     • bisacodyl  10 mg Rectal Daily PRN Fay Llamas MD     • calcitriol  0 25 mcg Oral Daily Sudeep Ospina MD     • calcium carbonate  1,000 mg Oral Daily PRN Sdueep Ospina MD     • cyanocobalamin  100 mcg Oral Daily Sudeep Ospina MD     • docusate sodium  100 mg Oral BID Sudeep Ospina MD     • fludrocortisone  0 2 mg Oral Daily Sudeep Ospina MD     • hydrALAZINE  5 mg Intravenous Q6H PRN Fay Llamas MD     • hydrocortisone  25 mg Oral 2 times per day Madison Tenorio DO     • insulin glargine  20 Units Subcutaneous HS Madison Tenorio DO     • insulin lispro  1-5 Units Subcutaneous TID ANIYAH Ospina MD     • insulin lispro  1-5 Units Subcutaneous HS Sudeep Ospina MD     • insulin lispro  6 Units Subcutaneous TID With Meals Madison Tenorio DO     • metoprolol tartrate  12 5 mg Oral BID Sudeep Ospina MD     • midodrine  5 mg Oral TID PRN Fay Llamas MD     • ondansetron  4 mg Intravenous Q6H PRN Lindsay Mcelroy MD     • polyethylene glycol  17 g Oral Daily Thais Beckford MD     • potassium chloride  20 mEq Oral BID Lindsay Mcelroy MD     • risperiDONE  1 mg Oral BID Lindsay Mcelroy MD     • senna  2 tablet Oral Daily Lindsay Mcelroy MD     • sertraline  100 mg Oral Daily Lindsay Mcelroy MD     • sodium chloride (PF)  3 mL Intravenous Q1H PRN Theresa Cordero DO          Today, Patient Was Seen By: Thais Beckford MD    **Please Note: This note may have been constructed using a voice recognition system  **

## 2022-12-04 NOTE — ASSESSMENT & PLAN NOTE
Lab Results   Component Value Date    HGBA1C 7 5 (H) 07/22/2022       Recent Labs     12/03/22  1055 12/03/22  1617 12/03/22 2053 12/04/22  0759   POCGLU 157* 140 191* 292*       Blood Sugar Average: Last 72 hrs:  · (P) 197 [id number]   · Diabetic diet  · fingersticks QID with sliding scale coverage  · Continue basal lantus and prandial insulin -  Expect needs will increase now that patient is on IV higher dose steroids, will discuss with endocrinology  · Of note patient has been profoundly hypoglycemic in the past on hospitalizations and required intubation

## 2022-12-04 NOTE — ASSESSMENT & PLAN NOTE
· Patient noted to be hypertensive on 11/30 after receiving around the clock midodrine (at home patient is on p r n  Midodrine for SBP is less than 110)  · Patient now on p r n  Midodrine, receiving as needed with appropriate response  · Blood pressures have been somewhat labile  · P r n   Hydralazine ordered as well

## 2022-12-04 NOTE — PROGRESS NOTES
Progress Note - Antolin Cedeno 74 y o  male MRN: [id number]    Unit/Bed#: -01 Encounter: [id number]    CC: diabetes f/u    Subjective:   Antolin Cedeno is a 74y o  year old male with type 1 DM and adrenal insufficiency who presented with hypotension and was found to have subacute CVA  He denies any dizziness or lightheadedness  He has no major complaints today, he reports having good appetite eating all of his breakfast today and all of his dinner yesterday  We discussed that his blood sugar at bedtime yesterday was 181 and blood sugar this morning was elevated in high 200s  He does report eating ice cream overnight  Objective:     Vitals: Blood pressure 157/76, pulse 67, temperature 98 3 °F (36 8 °C), resp  rate 16, weight 86 kg (189 lb 9 5 oz), SpO2 98 %  ,Body mass index is 32 54 kg/m²  Intake/Output Summary (Last 24 hours) at 12/4/2022 1441  Last data filed at 12/3/2022 1656  Gross per 24 hour   Intake 125 ml   Output --   Net 125 ml       Physical Exam:  General Appearance: awake, appears stated age and cooperative  Head: Normocephalic, without obvious abnormality, atraumatic  Extremities: moves all extremities  Skin: Skin color and temperature normal    Pulm: no labored breathing    Lab, Imaging and other studies: I have personally reviewed pertinent reports  POC Glucose (mg/dl)   Date Value   12/04/2022 300 (H)   12/04/2022 292 (H)   12/03/2022 191 (H)   12/03/2022 140   12/03/2022 157 (H)   12/03/2022 159 (H)   12/02/2022 136   12/02/2022 72   12/02/2022 84   12/02/2022 168 (H)       Assessment and plan:  Adrenal insufficiency  Current inpatient regimen:  hydrocortisone 25 mg p o  B i d , to be administered at 7:00 a m  And 2:00 p m continue fludrocortisone 0 2 mg daily  Will resume home dosing hydrocortisone 10 mg b i d  At 7:00 a m  And 2:00 p m  Tomorrow  Home regimen:  Hydrocortisone 10 mg b i d  In a m   And afternoon, fludrocortisone 0 2 mg daily     Type 1 diabetes  Home regimen: Lantus 14 units, lispro 6 units t i d  Inpatient regimen:  30 units Lantus at bedtime lispro 10 units t i d  With meals  Most recent A1c 7 5 07/2022  Continue plan for 20 units of Lantus  Continue Humalog to 6 units t i d  With meals  We discussed avoiding snacks if possible and avoiding snacks containing exess carbohydrates  Continue to monitor blood glucoses and make adjustments as needed over time        Portions of the record may have been created with voice recognition software

## 2022-12-04 NOTE — ASSESSMENT & PLAN NOTE
· Unclear etiology of the inciting factor  He currently denies any symptoms  His daughter reports that he also has a mild productive cough  · Will check blood cultures, UA, and sputum culture  · Legs examined, chronic appearing wounds are present but do not appear acutely infected    · Continue hydrocortisone IV per endocrinology  · Consult endocrinology for assistance weaning down hydrocortisone  · 12/3 switch to p o  steroids from IV  · Per Endocrine, if stable through 12/5, can further down titrate steroids

## 2022-12-04 NOTE — PLAN OF CARE
Problem: Potential for Falls  Goal: Patient will remain free of falls  Description: INTERVENTIONS:  - Educate patient/family on patient safety including physical limitations  - Instruct patient to call for assistance with activity   - Consult OT/PT to assist with strengthening/mobility   - Keep Call bell within reach  - Keep bed low and locked with side rails adjusted as appropriate  - Keep care items and personal belongings within reach  - Initiate and maintain comfort rounds  - Make Fall Risk Sign visible to staff  - Offer Toileting every two Hours, in advance of need  - Initiate/Maintain bed alarm  - Obtain necessary fall risk management equipment: call bell  - Apply yellow socks and bracelet for high fall risk patients  - Consider moving patient to room near nurses station  Outcome: Progressing     Problem: PAIN - ADULT  Goal: Verbalizes/displays adequate comfort level or baseline comfort level  Description: Interventions:  - Encourage patient to monitor pain and request assistance  - Assess pain using appropriate pain scale  - Administer analgesics based on type and severity of pain and evaluate response  - Implement non-pharmacological measures as appropriate and evaluate response  - Consider cultural and social influences on pain and pain management  - Notify physician/advanced practitioner if interventions unsuccessful or patient reports new pain  Outcome: Progressing     Problem: INFECTION - ADULT  Goal: Absence or prevention of progression during hospitalization  Description: INTERVENTIONS:  - Assess and monitor for signs and symptoms of infection  - Monitor lab/diagnostic results  - Monitor all insertion sites, i e  indwelling lines, tubes, and drains  - Monitor endotracheal if appropriate and nasal secretions for changes in amount and color  - [place] appropriate cooling/warming therapies per order  - Administer medications as ordered  - Instruct and encourage patient and family to use good hand hygiene technique  - Identify and instruct in appropriate isolation precautions for identified infection/condition  Outcome: Progressing     Problem: SAFETY ADULT  Goal: Patient will remain free of falls  Description: INTERVENTIONS:  - Educate patient/family on patient safety including physical limitations  - Instruct patient to call for assistance with activity   - Consult OT/PT to assist with strengthening/mobility   - Keep Call bell within reach  - Keep bed low and locked with side rails adjusted as appropriate  - Keep care items and personal belongings within reach  - Initiate and maintain comfort rounds  - Make Fall Risk Sign visible to staff  - Offer Toileting every two Hours, in advance of need  - Initiate/Maintain bed alarm  - Obtain necessary fall risk management equipment: call bell  - Apply yellow socks and bracelet for high fall risk patients  - Consider moving patient to room near nurses station  Outcome: Progressing

## 2022-12-05 PROBLEM — E46 PROTEIN-CALORIE MALNUTRITION (HCC): Status: ACTIVE | Noted: 2022-12-05

## 2022-12-05 LAB
GLUCOSE SERPL-MCNC: 115 MG/DL (ref 65–140)
GLUCOSE SERPL-MCNC: 211 MG/DL (ref 65–140)
GLUCOSE SERPL-MCNC: 318 MG/DL (ref 65–140)
GLUCOSE SERPL-MCNC: 87 MG/DL (ref 65–140)

## 2022-12-05 RX ORDER — INSULIN LISPRO 100 [IU]/ML
8 INJECTION, SOLUTION INTRAVENOUS; SUBCUTANEOUS
Status: DISCONTINUED | OUTPATIENT
Start: 2022-12-05 | End: 2022-12-06

## 2022-12-05 RX ORDER — INSULIN GLARGINE 100 [IU]/ML
24 INJECTION, SOLUTION SUBCUTANEOUS
Status: DISCONTINUED | OUTPATIENT
Start: 2022-12-05 | End: 2022-12-06

## 2022-12-05 RX ADMIN — RISPERIDONE 1 MG: 1 TABLET ORAL at 09:25

## 2022-12-05 RX ADMIN — HYDROCORTISONE 10 MG: 10 TABLET ORAL at 09:30

## 2022-12-05 RX ADMIN — INSULIN LISPRO 2 UNITS: 100 INJECTION, SOLUTION INTRAVENOUS; SUBCUTANEOUS at 11:51

## 2022-12-05 RX ADMIN — POTASSIUM CHLORIDE 20 MEQ: 750 TABLET, EXTENDED RELEASE ORAL at 09:24

## 2022-12-05 RX ADMIN — INSULIN LISPRO 3 UNITS: 100 INJECTION, SOLUTION INTRAVENOUS; SUBCUTANEOUS at 06:21

## 2022-12-05 RX ADMIN — DOCUSATE SODIUM 100 MG: 100 CAPSULE, LIQUID FILLED ORAL at 17:14

## 2022-12-05 RX ADMIN — Medication 12.5 MG: at 09:25

## 2022-12-05 RX ADMIN — INSULIN LISPRO 8 UNITS: 100 INJECTION, SOLUTION INTRAVENOUS; SUBCUTANEOUS at 17:12

## 2022-12-05 RX ADMIN — VITAM B12 100 MCG: 100 TAB at 09:25

## 2022-12-05 RX ADMIN — POLYETHYLENE GLYCOL 3350 17 G: 17 POWDER, FOR SOLUTION ORAL at 09:36

## 2022-12-05 RX ADMIN — ASPIRIN 81 MG CHEWABLE TABLET 81 MG: 81 TABLET CHEWABLE at 09:25

## 2022-12-05 RX ADMIN — ATORVASTATIN CALCIUM 10 MG: 10 TABLET, FILM COATED ORAL at 09:25

## 2022-12-05 RX ADMIN — APIXABAN 5 MG: 5 TABLET, FILM COATED ORAL at 17:14

## 2022-12-05 RX ADMIN — HYDROCORTISONE 10 MG: 10 TABLET ORAL at 17:13

## 2022-12-05 RX ADMIN — FLUDROCORTISONE ACETATE 0.2 MG: 0.1 TABLET ORAL at 09:25

## 2022-12-05 RX ADMIN — INSULIN LISPRO 6 UNITS: 100 INJECTION, SOLUTION INTRAVENOUS; SUBCUTANEOUS at 11:51

## 2022-12-05 RX ADMIN — POTASSIUM CHLORIDE 20 MEQ: 750 TABLET, EXTENDED RELEASE ORAL at 17:14

## 2022-12-05 RX ADMIN — DOCUSATE SODIUM 100 MG: 100 CAPSULE, LIQUID FILLED ORAL at 09:29

## 2022-12-05 RX ADMIN — INSULIN LISPRO 6 UNITS: 100 INJECTION, SOLUTION INTRAVENOUS; SUBCUTANEOUS at 09:24

## 2022-12-05 RX ADMIN — RISPERIDONE 1 MG: 1 TABLET ORAL at 17:14

## 2022-12-05 RX ADMIN — SERTRALINE 100 MG: 100 TABLET, FILM COATED ORAL at 09:25

## 2022-12-05 RX ADMIN — APIXABAN 5 MG: 5 TABLET, FILM COATED ORAL at 09:24

## 2022-12-05 RX ADMIN — SENNOSIDES 17.2 MG: 8.6 TABLET, FILM COATED ORAL at 09:25

## 2022-12-05 RX ADMIN — CALCITRIOL CAPSULES 0.25 MCG 0.25 MCG: 0.25 CAPSULE ORAL at 09:24

## 2022-12-05 RX ADMIN — INSULIN GLARGINE 24 UNITS: 100 INJECTION, SOLUTION SUBCUTANEOUS at 21:17

## 2022-12-05 RX ADMIN — Medication 12.5 MG: at 17:13

## 2022-12-05 NOTE — PROGRESS NOTES
[facility]  Progress Note - Antolin Cedeno 1955, 74 y o  male MRN: [id number]  Unit/Bed#: MS Mora-01 Encounter: [id number]  Primary Care Provider: Eddie Thompson MD   Date and time admitted to hospital: 11/28/2022  3:27 PM    Unintentional weight loss  Assessment & Plan  · Patient has home visiting nurse, noted to weigh about 200 lb on 11/05, down to 180 lb just prior to admission  · About 20 lb unintentional weight loss over 3 weeks  · Per patient and collateral from daughter, p o  intake has not changed and patient  Was taking decent p o  at home  · Etiology unclear  · Nutrition consult   · CT torso done 11/30 w/o e/o malignancy however abnormalities in pancreas somewhat consistent with his known prior episode of pancreatitis  · Per GI consult, will need outpatient EGD and colo to further evaluate for unintentional weight loss  They have also ordered fecal elastase which they will follow up outpatient  No further imaging needed for pancreatic abnormalities at this juncture  * Adrenal insufficiency   Assessment & Plan  · Unclear etiology of the inciting factor  He currently denies any symptoms  His daughter reports that he also has a mild productive cough  · Will check blood cultures, UA, and sputum culture  · Legs examined, chronic appearing wounds are present but do not appear acutely infected    · Continue hydrocortisone IV per endocrinology  · Consult endocrinology for assistance weaning down hydrocortisone  · 12/3 switch to p o  steroids from IV  · Per Endocrine, back to home dose steroids on 12/5  · Will monitor and if remains stable, discharge tomorrow     Insulin dependent type 1 diabetes mellitus   Assessment & Plan  Lab Results   Component Value Date    HGBA1C 7 5 (H) 07/22/2022       Recent Labs     12/04/22  1557 12/04/22  2102 12/05/22  0618 12/05/22  1042   POCGLU 145* 202* 318* 211*       Blood Sugar Average: Last 72 hrs:  · (P) 883 [id number] · Diabetic diet  · fingersticks QID with sliding scale coverage  · Continue basal lantus and prandial insulin   · Of note patient has been profoundly hypoglycemic in the past on hospitalizations and required intubation  · Insulin needs fluctuating as patient steroid doses have been increased and now being tapered down, management per Endocrinology    Unspecified protein-calorie malnutrition ([facility] [address] )  Assessment & Plan  Malnutrition Findings:   Adult Malnutrition type: Acute illness  Adult Degree of Malnutrition: Unspecified protein calorie malnutrition  Malnutrition Characteristics: Muscle loss, Weight loss                  360 Statement: in setting of unknown etiology - 9% wt loss x 2 weeks, (11/14/22 208 lbs - 11/30/22 188 5 lbs),  muscle wasting/slight depression/hollowing in temple region, treated with diet, PT refusing supplements    BMI Findings: Body mass index is 32 58 kg/m²  CVA (cerebral vascular accident) [facility])  Assessment & Plan  · Head CT done on presentation to CT, initial read c/f subacute vs chronic lacunar cva identified since last month - per neurology review of this plus MRI brain done, findings c/w chronic infarcts, no subacute or acute findings  · Possible due to ischemia from adrenal insufficiency as he has been compliant with his eliquis  · Continue aspirin and eliquis  · MRI brain c/w chronic infarcts only  · Neurology consulted, given chronic infarcts without any concern for subacute or acute findings, continue current management      Obesity, morbid ([facility] [address] )  Assessment & Plan  · Affects all aspects of his care  · Weight loss counseling when stable as an outpatient    Orthostatic hypotension  Assessment & Plan  · Possibly due to Diabetes w diabetic autonomic neuropathy as earlier this year he had been in the hospital with orthostatic hypotension  Despite high-dose glucocorticoids he was still orthostatic indicating adrenal insufficiency was not the cause     · Continue florinef at home dose, at home takes midodrine PRN - was getting scheduled here iso hypotension on presentation, however patient has been hypertensive since then so changed midodrine to PRN on   · Monitor orthostatics  · Bps  labile    Psychiatric disorder  Assessment & Plan  · Continue risperidone and sertraline    Anemia of chronic disease  Assessment & Plan  · Hb is above his baseline  · Continue B12 supplementation    Paroxysmal atrial fibrillation   Assessment & Plan  · Continue metoprolol for rate control  · Continue Eliquis for [facility]    Essential hypertension  Assessment & Plan  · Patient noted to be hypertensive on  after receiving around the clock midodrine (at home patient is on p r n  Midodrine for SBP is less than 110)  · Patient now on p r n  Midodrine, receiving as needed with appropriate response  · Blood pressures have been somewhat labile  · P r n  Hydralazine ordered as well        VTE Pharmacologic Prophylaxis:   High Risk (Score >/= 5) - Pharmacological DVT Prophylaxis Ordered: apixaban (Eliquis)  Sequential Compression Devices Ordered  Patient Centered Rounds: Discussed with RN  Discussions with Specialists or Other Care Team Provider:  Endocrinology    Education and Discussions with Family / Patient: Updated  (daughter) via phone  Time Spent for Care: 20 minutes  More than 50% of total time spent on counseling and coordination of care as described above  Current Length of Stay: 7 day(s)  Current Patient Status: Inpatient   Certification Statement: The patient will continue to require additional inpatient hospital stay due to Monitoring  Discharge Plan: Anticipate discharge tomorrow to home with home services  Code Status: Level 1 - Full Code    Subjective:   No acute concerns      Objective:     Vitals:   Temp (24hrs), Av °F (36 7 °C), Min:97 7 °F (36 5 °C), Max:98 3 °F (36 8 °C)    Temp:  [97 7 °F (36 5 °C)-98 3 °F (36 8 °C)] 98 3 °F (36 8 °C)  HR:  [67-73] 69  Resp: [17] 17  BP: (118-171)/(67-79) 150/79  SpO2:  [97 %-98 %] 98 %  Body mass index is 32 58 kg/m²  Input and Output Summary (last 24 hours): Intake/Output Summary (Last 24 hours) at 12/5/2022 1449  Last data filed at 12/5/2022 6097  Gross per 24 hour   Intake 305 ml   Output --   Net 305 ml       Physical Exam:   Physical Exam  Vitals reviewed  Constitutional:       General: He is not in acute distress  Appearance: He is not toxic-appearing  HENT:      Mouth/Throat:      Mouth: Mucous membranes are moist    Eyes:      General: No scleral icterus  Pulmonary:      Effort: Pulmonary effort is normal  No respiratory distress  Abdominal:      General: Bowel sounds are normal       Palpations: Abdomen is soft  Tenderness: There is no abdominal tenderness  Musculoskeletal:      Right lower leg: No edema  Left lower leg: No edema  Skin:     Comments: Chronic LE wounds with clean dry dressing    Neurological:      General: No focal deficit present  Mental Status: He is alert and oriented to person, place, and time     Psychiatric:         Mood and Affect: Mood normal          Behavior: Behavior normal             Additional Data:     Labs:  Results from last 7 days   Lab Units 12/04/22  0529   WBC Thousand/uL 8 61   HEMOGLOBIN g/dL 11 0*   HEMATOCRIT % 35 5*   PLATELETS Thousands/uL 217   NEUTROS PCT % 66   LYMPHS PCT % 19   MONOS PCT % 13*   EOS PCT % 1     Results from last 7 days   Lab Units 12/04/22  0529 12/03/22  1023 12/02/22  0623   SODIUM mmol/L 139   < > 139   POTASSIUM mmol/L 4 0   < > 4 9   CHLORIDE mmol/L 107   < > 108   CO2 mmol/L 27   < > 27   BUN mg/dL 16   < > 23   CREATININE mg/dL 0 96   < > 1 00   ANION GAP mmol/L 5   < > 4   CALCIUM mg/dL 8 9   < > 8 9   ALBUMIN g/dL  --   --  2 9*   TOTAL BILIRUBIN mg/dL  --   --  0 37   ALK PHOS U/L  --   --  72   ALT U/L  --   --  27   AST U/L  --   --  42   GLUCOSE RANDOM mg/dL 264*   < > 170*    < > = values in this interval not displayed  Results from last 7 days   Lab Units 12/05/22  1042 12/05/22  0618 12/04/22  2102 12/04/22  1557 12/04/22  1201 12/04/22  0759 12/03/22  2053 12/03/22  1617 12/03/22  1055 12/03/22  0808 12/02/22  2212 12/02/22  2048   POC GLUCOSE mg/dl 211* 318* 202* 145* 300* 292* 191* 140 157* 159* 136 72         Results from last 7 days   Lab Units 11/30/22  1105 11/29/22  0625   PROCALCITONIN ng/ml <0 05 <0 05       Lines/Drains:  Invasive Devices     Peripheral Intravenous Line  Duration           Peripheral IV 12/03/22 Left Wrist 2 days                      Imaging: No pertinent imaging reviewed  Recent Cultures (last 7 days):   Results from last 7 days   Lab Units 11/28/22  1911 11/28/22  1549   BLOOD CULTURE  No Growth After 5 Days  No Growth After 5 Days         Last 24 Hours Medication List:   Current Facility-Administered Medications   Medication Dose Route Frequency Provider Last Rate   • acetaminophen  650 mg Oral Q6H PRN Keiry Carvalho MD     • apixaban  5 mg Oral BID Keiry Carvalho MD     • aspirin  81 mg Oral Daily Keiry Carvalho MD     • atorvastatin  10 mg Oral Daily Keiry Carvalho MD     • bisacodyl  10 mg Rectal Daily PRN Qing Torres MD     • calcitriol  0 25 mcg Oral Daily Keiry Carvalho MD     • calcium carbonate  1,000 mg Oral Daily PRN Keiry Carvalho MD     • cyanocobalamin  100 mcg Oral Daily Keiry Carvalho MD     • docusate sodium  100 mg Oral BID Keiry Carvalho MD     • fludrocortisone  0 2 mg Oral Daily Keiry Carvalho MD     • hydrALAZINE  5 mg Intravenous Q6H PRN Qing Torres MD     • hydrocortisone  10 mg Oral 2 times per day Madison Tenorio DO     • insulin glargine  24 Units Subcutaneous HS Nilo Herrera MD     • insulin lispro  1-5 Units Subcutaneous TID AC Keiry Carvalho MD     • insulin lispro  1-5 Units Subcutaneous HS Keiry Carvalho MD     • insulin lispro  8 Units Subcutaneous TID With Meals Nilo Herrera MD     • metoprolol tartrate  12 5 mg Oral BID Salena Gardiner MD     • midodrine  5 mg Oral TID PRN Vicki Worley MD     • ondansetron  4 mg Intravenous Q6H PRN Salena Gardiner MD     • polyethylene glycol  17 g Oral Daily Vicki Worley MD     • potassium chloride  20 mEq Oral BID Salena Gardiner MD     • risperiDONE  1 mg Oral BID Salena Gardiner MD     • senna  2 tablet Oral Daily Salena Gardiner MD     • sertraline  100 mg Oral Daily Salena Gardiner MD     • sodium chloride (PF)  3 mL Intravenous Q1H PRN Markel Justin DO          Today, Patient Was Seen By: Vicki Worley MD    **Please Note: This note may have been constructed using a voice recognition system  **

## 2022-12-05 NOTE — UTILIZATION REVIEW
Continued Stay Review    Date: 12/3/22                       Current Patient Class: IP Current Level of Care: MS    HPI:67 y o  male initially admitted on 11/28    Assessment/Plan:   Started on IV steroids AM of 12/3 and Glargine held overnight  BP improving  Will do Lantus 5 u now and 15 U at HS and Humalog 6 u w/ meals  Then do Lantus 20 u daily starting 12/4 but may require less insulin with lower steroid use  Use Lispro as needed for correction  Adrenal insufficiency - will change to oral steroids  Hydrocortisone 25 mf AM and PM, resume fludrocortisone and if stable in 48 hrs can wean steroids further  Per GI will need OP EGD and colonoscopy to eval for unintentional weight loss  No further imaging needed at this time       Vital Signs:   12/04/22 21:02:41 98 1 °F (36 7 °C) 71 -- 171/73 Abnormal  106 98 % None (Room air)   12/04/22 15:06:36 97 7 °F (36 5 °C) 73 -- 118/67 84 98 % --   12/04/22 10:55:44 -- 67 -- 157/76 103 98 % --   12/04/22 08:45:15 -- 68 -- -- -- 98 % --   12/03/22 20:53:26 98 3 °F (36 8 °C) 74 -- 159/70 100 97 % --   12/03/22 15:31:40 98 4 °F (36 9 °C) 64 -- 141/73 96 99 % --   12/03/22 08:47:42 -- 73 -- 147/59 88 98 % --   12/03/22 08:09:24 98 1 °F (36 7 °C) 69 -- 94/42 Abnormal  59 Abnormal  98 % --     Pertinent Labs/Diagnostic Results:   Results from last 7 days   Lab Units 11/28/22  1557   SARS-COV-2  Negative     Results from last 7 days   Lab Units 12/04/22  0529 12/01/22  0524 11/30/22  0616 11/29/22  0447 11/28/22  1557   WBC Thousand/uL 8 61 8 59 10 72* 7 64 7 84   HEMOGLOBIN g/dL 11 0* 10 6* 10 1* 10 8* 10 1*   HEMATOCRIT % 35 5* 35 0* 32 9* 36 1* 32 0*   PLATELETS Thousands/uL 217 200 196 195 214   NEUTROS ABS Thousands/µL 5 76 7 09 8 65*  --  5 16         Results from last 7 days   Lab Units 12/04/22  0529 12/03/22  1023 12/02/22  0623 12/01/22  0524 11/30/22  0438   SODIUM mmol/L 139 138 139 138 135   POTASSIUM mmol/L 4 0 4 8 4 9 4 2 4 8   CHLORIDE mmol/L 107 107 108 104 108 CO2 mmol/L 27 28 27 28 21   ANION GAP mmol/L 5 3* 4 6 6   BUN mg/dL 16 19 23 24 22   CREATININE mg/dL 0 96 1 03 1 00 1 16 1 15   EGFR ml/min/1 73sq m 82 75 78 65 66   CALCIUM mg/dL 8 9 8 8 8 9 8 8 9 4     Results from last 7 days   Lab Units 12/02/22  0623 11/28/22  1557   AST U/L 42 20   ALT U/L 27 19   ALK PHOS U/L 72 77   TOTAL PROTEIN g/dL 6 9 7 1   ALBUMIN g/dL 2 9* 3 6   TOTAL BILIRUBIN mg/dL 0 37 0 39     Results from last 7 days   Lab Units 12/05/22  1042 12/05/22  0618 12/04/22  2102 12/04/22  1557 12/04/22  1201 12/04/22  0759 12/03/22  2053 12/03/22  1617 12/03/22  1055 12/03/22  0808 12/02/22  2212 12/02/22  2048   POC GLUCOSE mg/dl 211* 318* 202* 145* 300* 292* 191* 140 157* 159* 136 72     Results from last 7 days   Lab Units 12/04/22  0529 12/03/22  1023 12/02/22  0623 12/01/22  0524 11/30/22  0438 11/29/22  0000 11/28/22  1557   GLUCOSE RANDOM mg/dL 264* 209* 170* 331* 262* 250* 140     Results from last 7 days   Lab Units 12/02/22  0623   CK TOTAL U/L 102     Results from last 7 days   Lab Units 11/28/22  2136 11/28/22  1757 11/28/22  1557   HS TNI 0HR ng/L  --   --  10   HS TNI 2HR ng/L  --  8  --    HSTNI D2 ng/L  --  -2  --    HS TNI 4HR ng/L 7  --   --    HSTNI D4 ng/L -3  --   --                  Results from last 7 days   Lab Units 11/30/22  1105 11/29/22  0625   PROCALCITONIN ng/ml <0 05 <0 05     Results from last 7 days   Lab Units 11/29/22  0318   CLARITY UA  Clear   COLOR UA  Yellow   SPEC GRAV UA  1 024   PH UA  6 0   GLUCOSE UA mg/dl Trace*   KETONES UA mg/dl Negative   BLOOD UA  Negative   PROTEIN UA mg/dl Trace*   NITRITE UA  Negative   BILIRUBIN UA  Negative   UROBILINOGEN UA (BE) mg/dl 2 0*   LEUKOCYTES UA  Negative   WBC UA /hpf 2-4*   RBC UA /hpf 4-10*   BACTERIA UA /hpf Occasional   EPITHELIAL CELLS WET PREP /hpf None Seen     Results from last 7 days   Lab Units 11/28/22  4667   INFLUENZA A PCR  Negative   INFLUENZA B PCR  Negative   RSV PCR  Negative     Results from last 7 days   Lab Units 11/28/22  1911 11/28/22  1549   BLOOD CULTURE  No Growth After 5 Days  No Growth After 5 Days  Medications:   Scheduled Medications:  apixaban, 5 mg, Oral, BID  aspirin, 81 mg, Oral, Daily  atorvastatin, 10 mg, Oral, Daily  calcitriol, 0 25 mcg, Oral, Daily  cyanocobalamin, 100 mcg, Oral, Daily  docusate sodium, 100 mg, Oral, BID  fludrocortisone, 0 2 mg, Oral, Daily  hydrocortisone, 10 mg, Oral, 2 times per day  insulin glargine, 20 Units, Subcutaneous, HS  insulin lispro, 1-5 Units, Subcutaneous, TID AC  insulin lispro, 1-5 Units, Subcutaneous, HS  insulin lispro, 6 Units, Subcutaneous, TID With Meals  metoprolol tartrate, 12 5 mg, Oral, BID  polyethylene glycol, 17 g, Oral, Daily  potassium chloride, 20 mEq, Oral, BID  risperiDONE, 1 mg, Oral, BID  senna, 2 tablet, Oral, Daily  sertraline, 100 mg, Oral, Daily      Continuous IV Infusions:     PRN Meds:  acetaminophen, 650 mg, Oral, Q6H PRN  bisacodyl, 10 mg, Rectal, Daily PRN  calcium carbonate, 1,000 mg, Oral, Daily PRN  hydrALAZINE, 5 mg, Intravenous, Q6H PRN  midodrine, 5 mg, Oral, TID PRN - x 1 12/4  ondansetron, 4 mg, Intravenous, Q6H PRN  sodium chloride (PF), 3 mL, Intravenous, Q1H PRN    Discharge Plan: [facility]    Network Utilization Review Department  ATTENTION: Please call with any questions or concerns to [phone] and carefully listen to the prompts so that you are directed to the right person  All voicemails are confidential   Lynette Kussmaul all requests for admission clinical reviews, approved or denied determinations and any other requests to dedicated fax number below belonging to the campus where the patient is receiving treatment   List of dedicated fax numbers for the Facilities:  [address] [place] [address] DENIALS (Administrative/Medical Necessity) [phone]   [address] [place] [address] (Maternity/NICU/Pediatrics) [address] [place] [address] [address] [address] Sushil Murphy [facility] [address] [facility] 134 815 [facility] [phone]

## 2022-12-05 NOTE — PLAN OF CARE
Problem: Potential for Falls  Goal: Patient will remain free of falls  Description: INTERVENTIONS:  - Educate patient/family on patient safety including physical limitations  - Instruct patient to call for assistance with activity   - Consult OT/PT to assist with strengthening/mobility   - Keep Call bell within reach  - Keep bed low and locked with side rails adjusted as appropriate  - Keep care items and personal belongings within reach  - Initiate and maintain comfort rounds  - Make Fall Risk Sign visible to staff  - Apply yellow socks and bracelet for high fall risk patients  - Consider moving patient to room near nurses station  Outcome: Progressing     Problem: MOBILITY - ADULT  Goal: Maintain or return to baseline ADL function  Description: INTERVENTIONS:  -  Assess patient's ability to carry out ADLs; assess patient's baseline for ADL function and identify physical deficits which impact ability to perform ADLs (bathing, care of mouth/teeth, toileting, grooming, dressing, etc )  - Assess/evaluate cause of self-care deficits   - Assess range of motion  - Assess patient's mobility; develop plan if impaired  - Assess patient's need for assistive devices and provide as appropriate  - Encourage maximum independence but intervene and supervise when necessary  - Involve family in performance of ADLs  - Assess for home care needs following discharge   - Consider OT consult to assist with ADL evaluation and planning for discharge  - Provide patient education as appropriate  Outcome: Progressing  Goal: Maintains/Returns to pre admission functional level  Description: INTERVENTIONS:  - Perform BMAT or MOVE assessment daily    - Set and communicate daily mobility goal to care team and patient/family/caregiver  - Collaborate with rehabilitation services on mobility goals if consulted  - Perform Range of Motion 3 times a day  - Reposition patient every 2 hours    - Dangle patient 3 times a day  - Stand patient 3 times a day  - Ambulate patient 3 times a day  - Out of bed to chair 3 times a day   - Out of bed for meals 3 times a day  - Out of bed for toileting  - Record patient progress and toleration of activity level   Outcome: Progressing     Problem: PAIN - ADULT  Goal: Verbalizes/displays adequate comfort level or baseline comfort level  Description: Interventions:  - Encourage patient to monitor pain and request assistance  - Assess pain using appropriate pain scale  - Administer analgesics based on type and severity of pain and evaluate response  - Implement non-pharmacological measures as appropriate and evaluate response  - Consider cultural and social influences on pain and pain management  - Notify physician/advanced practitioner if interventions unsuccessful or patient reports new pain  Outcome: Progressing

## 2022-12-05 NOTE — ASSESSMENT & PLAN NOTE
Lab Results   Component Value Date    HGBA1C 7 5 (H) 07/22/2022       Recent Labs     12/04/22  1557 12/04/22  2102 12/05/22  0618 12/05/22  1042   POCGLU 145* 202* 318* 211*       Blood Sugar Average: Last 72 hrs:  · (P) 521 [id number]   · Diabetic diet  · fingersticks QID with sliding scale coverage  · Continue basal lantus and prandial insulin   · Of note patient has been profoundly hypoglycemic in the past on hospitalizations and required intubation  · Insulin needs fluctuating as patient steroid doses have been increased and now being tapered down, management per Endocrinology

## 2022-12-05 NOTE — PROGRESS NOTES
Progress Note - Marylu Mann 74 y o  male MRN: [id number]    Unit/Bed#: -01 Encounter: [id number]      CC: diabetes f/u    Subjective:   Maria Ines Jasso a 74 y  o  year old male with type 1 DM and adrenal insufficiency who presented with hypotension and was found to have subacute CVA  He denies any dizziness or lightheadedness  currently patient reports the he is feeling okay he has good appetite  No episodes of hypoglycemia       Objective:     Vitals: Blood pressure 150/79, pulse 69, temperature 98 3 °F (36 8 °C), resp  rate 17, weight 86 1 kg (189 lb 13 1 oz), SpO2 98 %  ,Body mass index is 32 58 kg/m²  Intake/Output Summary (Last 24 hours) at 12/5/2022 1434  Last data filed at 12/5/2022 3242  Gross per 24 hour   Intake 305 ml   Output --   Net 305 ml       Physical Exam:  General Appearance: awake, appears stated age and cooperative  Head: Normocephalic, without obvious abnormality, atraumatic  Extremities: moves all extremities  Skin: Skin color and temperature normal    Pulm: no labored breathing        POC Glucose (mg/dl)   Date Value   12/05/2022 211 (H)   12/05/2022 318 (H)   12/04/2022 202 (H)   12/04/2022 145 (H)   12/04/2022 300 (H)   12/04/2022 292 (H)   12/03/2022 191 (H)   12/03/2022 140   12/03/2022 157 (H)   12/03/2022 159 (H)     Assessment and plan:  Adrenal insufficiency  Blood pressure stable and  no episodes of hypoglycemia recommend to continue with hydrocortisone 10 mg b i d  Today (home regimen)   Continue with fludrocortisone 0 2 mg daily  Home regimen:  Hydrocortisone 10 mg b i d  In a m  And afternoon, fludrocortisone 0 2 mg daily     Type 1 diabetes  Home regimen:  Lantus 14 units, lispro 6 units t i d   Most recent A1c 7 5 07/2022  Patient had episodes of prandial hyperglycemia and fasting hyperglycemia we recommend to increase Lantus from 20 to 24 units at bedtime and increase lispro from 6 units to 8 units t i d  With meals    Continue with correctional scale  Continue following with Endocrinology outpatient ( Dr Siri Quevedo)     Portions of the record may have been created with voice recognition software

## 2022-12-05 NOTE — CASE MANAGEMENT
Case Management Discharge Planning Note    Patient name Alan Ornelas  Location /-07 MRN [id number]  : 1955 Date 2022       Current Admission Date: 2022  Current Admission Diagnosis:Adrenal insufficiency    Patient Active Problem List    Diagnosis Date Noted   • Protein-calorie malnutrition ([facility] [address] ) 2022   • Unspecified protein-calorie malnutrition ([facility] [address] ) 2022   • Unintentional weight loss 2022   • CVA (cerebral vascular accident) ([facility] [address] ) 2022   • Obesity, morbid ([facility] [address] ) 2022   • Type 2 MI (myocardial infarction) ([facility] [address] ) 2022   • Orthostatic hypotension 2022   • Recurrent hypoglycemia 10/19/2022   • Insulin dependent type 1 diabetes mellitus  10/19/2022   • Adrenal insufficiency  10/19/2022   • Essential hypertension 10/19/2022   • Paroxysmal atrial fibrillation  10/19/2022   • History of stroke 10/19/2022   • Anemia of chronic disease 10/19/2022   • Psychiatric disorder 10/19/2022      LOS (days): 7  Geometric Mean LOS (GMLOS) (days): 5 10  Days to GMLOS:-1 8     OBJECTIVE:  Risk of Unplanned Readmission Score: 20 87         Current admission status: Inpatient   Preferred Pharmacy:   [address] [place],[address]D, [address] - [address] [facility] 38 210 [facility]  Phone: [phone] Fax: [phone] [facility], [address] - [address]  [address] [address]  Phone: [phone] Fax: [phone]    Primary Care Provider: Adan Quiñonez MD    Primary Insurance: [address] [place] [address]  Secondary Insurance:     DISCHARGE DETAILS:    Discharge planning discussed with[de-identified] Karyn Suh daughter  Freedom of Choice: Yes  Comments - Freedom of Choice: d/c home  CM contacted family/caregiver?: Yes  Were Treatment Team discharge recommendations reviewed with patient/caregiver?: Yes  Did patient/caregiver verbalize understanding of patient care needs?: Yes  Were patient/caregiver advised of the risks associated with not following Treatment Team discharge recommendations?: Yes    Contacts  Patient Contacts: Melva Pacheco  Relationship to Patient[de-identified] Family  Reason/Outcome: Continuity of Care, Emergency Contact, Discharge Planning                           Discharge Destination Plan[de-identified] Home                                IMM Given (Date):: 12/05/22  IMM Given to[de-identified] Family  Family notified[de-identified] Melva Pacheco Daughter

## 2022-12-05 NOTE — ASSESSMENT & PLAN NOTE
· Unclear etiology of the inciting factor  He currently denies any symptoms  His daughter reports that he also has a mild productive cough  · Will check blood cultures, UA, and sputum culture  · Legs examined, chronic appearing wounds are present but do not appear acutely infected    · Continue hydrocortisone IV per endocrinology  · Consult endocrinology for assistance weaning down hydrocortisone  · 12/3 switch to p o  steroids from IV  · Per Endocrine, back to home dose steroids on 12/5  · Will monitor and if remains stable, discharge tomorrow

## 2022-12-05 NOTE — ASSESSMENT & PLAN NOTE
Malnutrition Findings:   Adult Malnutrition type: Acute illness  Adult Degree of Malnutrition: Unspecified protein calorie malnutrition  Malnutrition Characteristics: Muscle loss, Weight loss                  360 Statement: in setting of unknown etiology - 9% wt loss x 2 weeks, (11/14/22 208 lbs - 11/30/22 188 5 lbs),  muscle wasting/slight depression/hollowing in temple region, treated with diet, PT refusing supplements    BMI Findings: Body mass index is 32 58 kg/m²

## 2022-12-06 ENCOUNTER — APPOINTMENT (INPATIENT)
Dept: RADIOLOGY | Facility: HOSPITAL | Age: 67
End: 2022-12-06

## 2022-12-06 LAB
ANION GAP SERPL CALCULATED.3IONS-SCNC: 8 MMOL/L (ref 4–13)
BASOPHILS # BLD AUTO: 0.02 THOUSANDS/ÂΜL (ref 0–0.1)
BASOPHILS NFR BLD AUTO: 0 % (ref 0–1)
BUN SERPL-MCNC: 16 MG/DL (ref 5–25)
CALCIUM SERPL-MCNC: 8.5 MG/DL (ref 8.3–10.1)
CHLORIDE SERPL-SCNC: 110 MMOL/L (ref 96–108)
CO2 SERPL-SCNC: 21 MMOL/L (ref 21–32)
CREAT SERPL-MCNC: 1.1 MG/DL (ref 0.6–1.3)
EOSINOPHIL # BLD AUTO: 0.18 THOUSAND/ÂΜL (ref 0–0.61)
EOSINOPHIL NFR BLD AUTO: 2 % (ref 0–6)
ERYTHROCYTE [DISTWIDTH] IN BLOOD BY AUTOMATED COUNT: 14.6 % (ref 11.6–15.1)
GFR SERPL CREATININE-BSD FRML MDRD: 70 ML/MIN/1.73SQ M
GLUCOSE SERPL-MCNC: 107 MG/DL (ref 65–140)
GLUCOSE SERPL-MCNC: 123 MG/DL (ref 65–140)
GLUCOSE SERPL-MCNC: 203 MG/DL (ref 65–140)
GLUCOSE SERPL-MCNC: 257 MG/DL (ref 65–140)
GLUCOSE SERPL-MCNC: 56 MG/DL (ref 65–140)
HCT VFR BLD AUTO: 34.5 % (ref 36.5–49.3)
HGB BLD-MCNC: 10.5 G/DL (ref 12–17)
IMM GRANULOCYTES # BLD AUTO: 0.03 THOUSAND/UL (ref 0–0.2)
IMM GRANULOCYTES NFR BLD AUTO: 0 % (ref 0–2)
LYMPHOCYTES # BLD AUTO: 2.51 THOUSANDS/ÂΜL (ref 0.6–4.47)
LYMPHOCYTES NFR BLD AUTO: 28 % (ref 14–44)
MCH RBC QN AUTO: 28.2 PG (ref 26.8–34.3)
MCHC RBC AUTO-ENTMCNC: 30.4 G/DL (ref 31.4–37.4)
MCV RBC AUTO: 93 FL (ref 82–98)
MONOCYTES # BLD AUTO: 1.54 THOUSAND/ÂΜL (ref 0.17–1.22)
MONOCYTES NFR BLD AUTO: 17 % (ref 4–12)
NEUTROPHILS # BLD AUTO: 4.8 THOUSANDS/ÂΜL (ref 1.85–7.62)
NEUTS SEG NFR BLD AUTO: 53 % (ref 43–75)
NRBC BLD AUTO-RTO: 0 /100 WBCS
PLATELET # BLD AUTO: 205 THOUSANDS/UL (ref 149–390)
PMV BLD AUTO: 11.8 FL (ref 8.9–12.7)
POTASSIUM SERPL-SCNC: 4.3 MMOL/L (ref 3.5–5.3)
RBC # BLD AUTO: 3.73 MILLION/UL (ref 3.88–5.62)
SODIUM SERPL-SCNC: 139 MMOL/L (ref 135–147)
WBC # BLD AUTO: 9.08 THOUSAND/UL (ref 4.31–10.16)

## 2022-12-06 RX ORDER — INSULIN GLARGINE 100 [IU]/ML
24 INJECTION, SOLUTION SUBCUTANEOUS
Qty: 15 ML | Refills: 0 | Status: CANCELLED
Start: 2022-12-06 | End: 2023-01-05

## 2022-12-06 RX ORDER — INSULIN GLARGINE 100 [IU]/ML
22 INJECTION, SOLUTION SUBCUTANEOUS
Status: DISCONTINUED | OUTPATIENT
Start: 2022-12-06 | End: 2022-12-07

## 2022-12-06 RX ORDER — INSULIN LISPRO 100 [IU]/ML
6 INJECTION, SOLUTION INTRAVENOUS; SUBCUTANEOUS
Status: DISCONTINUED | OUTPATIENT
Start: 2022-12-06 | End: 2022-12-08 | Stop reason: HOSPADM

## 2022-12-06 RX ORDER — INSULIN LISPRO 100 [IU]/ML
8 INJECTION, SOLUTION INTRAVENOUS; SUBCUTANEOUS
Qty: 15 ML | Refills: 0 | Status: CANCELLED
Start: 2022-12-06

## 2022-12-06 RX ADMIN — INSULIN LISPRO 2 UNITS: 100 INJECTION, SOLUTION INTRAVENOUS; SUBCUTANEOUS at 21:38

## 2022-12-06 RX ADMIN — SERTRALINE 100 MG: 100 TABLET, FILM COATED ORAL at 08:34

## 2022-12-06 RX ADMIN — DOCUSATE SODIUM 100 MG: 100 CAPSULE, LIQUID FILLED ORAL at 08:34

## 2022-12-06 RX ADMIN — ATORVASTATIN CALCIUM 10 MG: 10 TABLET, FILM COATED ORAL at 08:34

## 2022-12-06 RX ADMIN — MIDODRINE HYDROCHLORIDE 5 MG: 5 TABLET ORAL at 08:39

## 2022-12-06 RX ADMIN — CALCITRIOL CAPSULES 0.25 MCG 0.25 MCG: 0.25 CAPSULE ORAL at 08:34

## 2022-12-06 RX ADMIN — SODIUM CHLORIDE 1000 ML: 0.9 INJECTION, SOLUTION INTRAVENOUS at 12:17

## 2022-12-06 RX ADMIN — RISPERIDONE 1 MG: 1 TABLET ORAL at 17:30

## 2022-12-06 RX ADMIN — INSULIN LISPRO 1 UNITS: 100 INJECTION, SOLUTION INTRAVENOUS; SUBCUTANEOUS at 18:11

## 2022-12-06 RX ADMIN — APIXABAN 5 MG: 5 TABLET, FILM COATED ORAL at 17:31

## 2022-12-06 RX ADMIN — HYDROCORTISONE 10 MG: 10 TABLET ORAL at 08:09

## 2022-12-06 RX ADMIN — APIXABAN 5 MG: 5 TABLET, FILM COATED ORAL at 08:34

## 2022-12-06 RX ADMIN — POLYETHYLENE GLYCOL 3350 17 G: 17 POWDER, FOR SOLUTION ORAL at 08:34

## 2022-12-06 RX ADMIN — POTASSIUM CHLORIDE 20 MEQ: 750 TABLET, EXTENDED RELEASE ORAL at 08:34

## 2022-12-06 RX ADMIN — Medication 12.5 MG: at 17:30

## 2022-12-06 RX ADMIN — INSULIN LISPRO 6 UNITS: 100 INJECTION, SOLUTION INTRAVENOUS; SUBCUTANEOUS at 18:12

## 2022-12-06 RX ADMIN — ACETAMINOPHEN 650 MG: 325 TABLET ORAL at 08:39

## 2022-12-06 RX ADMIN — SENNOSIDES 17.2 MG: 8.6 TABLET, FILM COATED ORAL at 08:34

## 2022-12-06 RX ADMIN — VITAM B12 100 MCG: 100 TAB at 08:34

## 2022-12-06 RX ADMIN — RISPERIDONE 1 MG: 1 TABLET ORAL at 08:34

## 2022-12-06 RX ADMIN — HYDROCORTISONE 10 MG: 10 TABLET ORAL at 13:48

## 2022-12-06 RX ADMIN — FLUDROCORTISONE ACETATE 0.2 MG: 0.1 TABLET ORAL at 08:34

## 2022-12-06 RX ADMIN — POTASSIUM CHLORIDE 20 MEQ: 750 TABLET, EXTENDED RELEASE ORAL at 17:30

## 2022-12-06 RX ADMIN — ASPIRIN 81 MG CHEWABLE TABLET 81 MG: 81 TABLET CHEWABLE at 08:34

## 2022-12-06 RX ADMIN — DOCUSATE SODIUM 100 MG: 100 CAPSULE, LIQUID FILLED ORAL at 17:30

## 2022-12-06 RX ADMIN — INSULIN LISPRO 8 UNITS: 100 INJECTION, SOLUTION INTRAVENOUS; SUBCUTANEOUS at 08:10

## 2022-12-06 RX ADMIN — INSULIN GLARGINE 22 UNITS: 100 INJECTION, SOLUTION SUBCUTANEOUS at 21:38

## 2022-12-06 NOTE — PROGRESS NOTES
Reny Wells [address] 1955, 74 y o  male MRN: [id number]  Unit/Bed#: [facility] Encounter: [id number]  Primary Care Provider: Moon Denis MD   Date and time admitted to hospital: 11/28/2022  3:27 PM    * Adrenal insufficiency   Assessment & Plan  Unclear etiology of the inciting factor  He currently denies any symptoms  His daughter reports that he also has a mild productive cough  · Infectious w/u was negative  · Chronic appearing wounds are present but do not appear acutely infected  · Was on IV hydrocortisone but now as of 12/5 is back on home regimen of Cortef 10 mg BID per endocrinology  · Also on home dose florinef   · Still with ongoing orthostatic hypotension--more severe over last 24 hours (BP 70s this AM similar to readings that brought him in, will give IVF bolus today, add compression stockings/abdominal binder)    Unintentional weight loss  Assessment & Plan  Patient has home visiting nurse, noted to weigh about 200 lb on 11/05, down to 180 lb just prior to admission  · About 20 lb unintentional weight loss over 3 weeks  · Per patient and collateral from daughter, p o  intake has not changed and patient was taking decent p o  at home  · Etiology unclear  · Nutrition consulted  · CT torso done 11/30 w/o e/o malignancy however abnormalities in pancreas somewhat consistent with his known prior episode of pancreatitis  · Per GI-- will need outpatient EGD and colonosocpy to further evaluate for unintentional weight loss  · They have also ordered fecal elastase which they will follow up outpatient  · No further imaging needed for pancreatic abnormalities at this juncture      CVA (cerebral vascular accident) [facility])  Assessment & Plan  Head CT done on presentation to CT, initial read c/f subacute vs chronic lacunar cva identified since last month - per neurology review of this plus MRI brain done, findings c/w chronic infarcts, no subacute or acute findings  · Continue aspirin and eliquis  · MRI brain c/w chronic infarcts only  · Neurology consulted, given chronic infarcts without any concern for subacute or acute findings, continue current management  · Daughter reported she felt speech was slightly more slurred on phone this AM when d/w patient  Could be related to severe hypotension  Check STAT Head CT      Orthostatic hypotension  Assessment & Plan  Possibly due to diabetes w diabetic autonomic neuropathy as earlier this year he had been in the hospital with orthostatic hypotension  Despite high-dose glucocorticoids he was still orthostatic indicating adrenal insufficiency was not the cause? ?  · Continue florinef at home dose, at home takes midodrine PRN--continue here  · When was on scheduled midodrine reportedly developed severe supine HTN  · BP 70s this AM however asymptomatic  Repeat still low but improved  · Will give IVF bolus  · Place compression stockings  · Place abdominal binder  · Consider renal vs cards input? Insulin dependent type 1 diabetes mellitus   Assessment & Plan  Lab Results   Component Value Date    HGBA1C 7 5 (H) 07/22/2022       Recent Labs     12/05/22  1042 12/05/22  1559 12/05/22  2046 12/06/22  0621   POCGLU 211* 87 115 107       Blood Sugar Average: Last 72 hrs:  · (P) 251 [id number]   · Endo following--currently on Lantus 24 units HS and Humalog 8 units TID with meals  · Glucose 56 most recently  D/W RN, hold mealtime insulin for lunch   D/W endo, will need them to adjust regimen    Unspecified protein-calorie malnutrition ([facility] [address] )  Assessment & Plan  Malnutrition Findings:   Adult Malnutrition type: Acute illness  Adult Degree of Malnutrition: Unspecified protein calorie malnutrition  Malnutrition Characteristics: Muscle loss, Weight loss                  360 Statement: in setting of unknown etiology - 9% wt loss x 2 weeks, (11/14/22 208 lbs - 11/30/22 188 5 lbs),  muscle wasting/slight depression/hollowing in temple region, treated with diet, PT refusing supplements    BMI Findings: Body mass index is 33 72 kg/m²  Psychiatric disorder  Assessment & Plan  · Continue risperidone and sertraline    Anemia of chronic disease  Assessment & Plan  Hb is above his baseline  · Continue B12 supplementation    Paroxysmal atrial fibrillation   Assessment & Plan  Continue metoprolol for rate control (would avoid holding entirely despite hypotension to prevent rapid HRs)  · Continue Eliquis for [facility]    Essential hypertension  Assessment & Plan  Patient noted to be hypertensive on 11/30 after receiving around the clock midodrine (at home patient is on p r n  Midodrine for SBP is less than 110)  · Patient now on p r n  Midodrine, receiving as needed with appropriate response  · Blood pressures have been somewhat labile though          VTE Pharmacologic Prophylaxis:   Moderate Risk (Score 3-4) - Pharmacological DVT Prophylaxis Ordered: apixaban (Eliquis)  Patient Centered Rounds: I performed bedside rounds with nursing staff today  Discussions with Specialists or Other Care Team Provider: Sree murphy CM    Education and Discussions with Family / Patient: Updated  (daughter) via phone  Time Spent for Care: 30 minutes  More than 50% of total time spent on counseling and coordination of care as described above  Current Length of Stay: 8 day(s)  Current Patient Status: Inpatient   Certification Statement: The patient will continue to require additional inpatient hospital stay due to ongoing hypotension, new hypoglycemia, ? speech change  Discharge Plan: Anticipate discharge in 24-48 hrs to home  Code Status: Level 1 - Full Code    Subjective:   Pt seen earlier this AM  Was without complaints despite low BPs  Speech was noted to be slightly slurred, however otherwise patient was nonfocal   Patient denied any dizziness or lightheadedness  His glucose around lunchtime was noted to be in the 50s  Objective:     Vitals:   Temp (24hrs), Av 2 °F (36 8 °C), Min:98 1 °F (36 7 °C), Max:98 2 °F (36 8 °C)    Temp:  [98 1 °F (36 7 °C)-98 2 °F (36 8 °C)] 98 1 °F (36 7 °C)  HR:  [66-69] 68  Resp:  [18] 18  BP: ()/(44-50) 97/50  SpO2:  [99 %-100 %] 100 %  Body mass index is 33 72 kg/m²  Input and Output Summary (last 24 hours): Intake/Output Summary (Last 24 hours) at 2022 1206  Last data filed at 2022 1730  Gross per 24 hour   Intake 125 ml   Output 1 ml   Net 124 ml       Physical Exam:   Physical Exam  Vitals and nursing note reviewed  Constitutional:       General: He is not in acute distress  Cardiovascular:      Rate and Rhythm: Normal rate  Pulmonary:      Effort: No respiratory distress  Abdominal:      General: There is no distension  Tenderness: There is no abdominal tenderness  Skin:     Coloration: Skin is pale  Comments: Chronic LE wounds    Neurological:      Mental Status: He is oriented to person, place, and time  Comments: Speech slightly slurred   Psychiatric:         Mood and Affect: Mood normal           Additional Data:     Labs:  Results from last 7 days   Lab Units 22  0511   WBC Thousand/uL 9 08   HEMOGLOBIN g/dL 10 5*   HEMATOCRIT % 34 5*   PLATELETS Thousands/uL 205   NEUTROS PCT % 53   LYMPHS PCT % 28   MONOS PCT % 17*   EOS PCT % 2     Results from last 7 days   Lab Units 22  0511 22  1023 22  0623   SODIUM mmol/L 139   < > 139   POTASSIUM mmol/L 4 3   < > 4 9   CHLORIDE mmol/L 110*   < > 108   CO2 mmol/L 21   < > 27   BUN mg/dL 16   < > 23   CREATININE mg/dL 1 10   < > 1 00   ANION GAP mmol/L 8   < > 4   CALCIUM mg/dL 8 5   < > 8 9   ALBUMIN g/dL  --   --  2 9*   TOTAL BILIRUBIN mg/dL  --   --  0 37   ALK PHOS U/L  --   --  72   ALT U/L  --   --  27   AST U/L  --   --  42   GLUCOSE RANDOM mg/dL 123   < > 170*    < > = values in this interval not displayed           Results from last 7 days   Lab Units 12/06/22  1122 12/06/22  0621 12/05/22  2046 12/05/22  1559 12/05/22  1042 12/05/22  0618 12/04/22  2102 12/04/22  1557 12/04/22  1201 12/04/22  0759 12/03/22  2053 12/03/22  1617   POC GLUCOSE mg/dl 56* 107 115 87 211* 318* 202* 145* 300* 292* 191* 140         Results from last 7 days   Lab Units 11/30/22  1105   PROCALCITONIN ng/ml <0 05       Lines/Drains:  Invasive Devices     Peripheral Intravenous Line  Duration           Peripheral IV 12/03/22 Left Wrist 3 days                      Imaging: Reviewed radiology reports from this admission including: MRI brain    Recent Cultures (last 7 days):         Last 24 Hours Medication List:   Current Facility-Administered Medications   Medication Dose Route Frequency Provider Last Rate   • acetaminophen  650 mg Oral Q6H PRN Marques Garsia MD     • apixaban  5 mg Oral BID Marques Garsia MD     • aspirin  81 mg Oral Daily Marques Garsia MD     • atorvastatin  10 mg Oral Daily Marques Garsia MD     • bisacodyl  10 mg Rectal Daily PRN Daksha Newman MD     • calcitriol  0 25 mcg Oral Daily Marques Garsia MD     • calcium carbonate  1,000 mg Oral Daily PRN Marques Garsia MD     • cyanocobalamin  100 mcg Oral Daily Marques Garsia MD     • docusate sodium  100 mg Oral BID Marques Garsia MD     • fludrocortisone  0 2 mg Oral Daily Marques Garsia MD     • hydrALAZINE  5 mg Intravenous Q6H PRN Daksha Newman MD     • hydrocortisone  10 mg Oral 2 times per day Madison Tenorio DO     • insulin glargine  24 Units Subcutaneous HS Alberto Sanchez MD     • insulin lispro  1-5 Units Subcutaneous TID AC Marques Garsia MD     • insulin lispro  1-5 Units Subcutaneous HS Marques Garsia MD     • insulin lispro  8 Units Subcutaneous TID With Meals Alberto Sanchez MD     • metoprolol tartrate  12 5 mg Oral BID Marques Garsia MD     • midodrine  5 mg Oral TID PRN Daksha Newman MD     • ondansetron  4 mg Intravenous Q6H PRN Marques Garsia MD     • polyethylene glycol  17 g Oral Daily Shan Caldwell MD     • potassium chloride  20 mEq Oral BID Pj Mascorro MD     • risperiDONE  1 mg Oral BID Pj Mascorro MD     • senna  2 tablet Oral Daily Pj Mascorro MD     • sertraline  100 mg Oral Daily Pj Mascorro MD     • sodium chloride (PF)  3 mL Intravenous Q1H PRN Ivan Snider DO     • sodium chloride  1,000 mL Intravenous Once Smith Jack PA-C          Today, Patient Was Seen By: Smith Jack PA-C    **Please Note: This note may have been constructed using a voice recognition system  **

## 2022-12-06 NOTE — ASSESSMENT & PLAN NOTE
Head CT done on presentation to CT, initial read c/f subacute vs chronic lacunar cva identified since last month - per neurology review of this plus MRI brain done, findings c/w chronic infarcts, no subacute or acute findings  · Continue aspirin and eliquis  · MRI brain c/w chronic infarcts only  · Neurology consulted, given chronic infarcts without any concern for subacute or acute findings, continue current management  · Daughter reported she felt speech was slightly more slurred on phone this AM when d/w patient  Could be related to severe hypotension   Check STAT Head CT

## 2022-12-06 NOTE — ASSESSMENT & PLAN NOTE
Unclear etiology of the inciting factor  He currently denies any symptoms  His daughter reports that he also has a mild productive cough  · Infectious w/u was negative  · Chronic appearing wounds are present but do not appear acutely infected    · Was on IV hydrocortisone but now as of 12/5 is back on home regimen of Cortef 10 mg BID per endocrinology  · Also on home dose florinef   · Still with ongoing orthostatic hypotension--more severe over last 24 hours (BP 70s this AM similar to readings that brought him in, will give IVF bolus today, add compression stockings/abdominal binder)

## 2022-12-06 NOTE — ASSESSMENT & PLAN NOTE
Continue metoprolol for rate control (would avoid holding entirely despite hypotension to prevent rapid HRs)  · Continue Eliquis for [facility]

## 2022-12-06 NOTE — ASSESSMENT & PLAN NOTE
Malnutrition Findings:   Adult Malnutrition type: Acute illness  Adult Degree of Malnutrition: Unspecified protein calorie malnutrition  Malnutrition Characteristics: Muscle loss, Weight loss                  360 Statement: in setting of unknown etiology - 9% wt loss x 2 weeks, (11/14/22 208 lbs - 11/30/22 188 5 lbs),  muscle wasting/slight depression/hollowing in temple region, treated with diet, PT refusing supplements    BMI Findings: Body mass index is 33 72 kg/m²

## 2022-12-06 NOTE — ASSESSMENT & PLAN NOTE
Possibly due to diabetes w diabetic autonomic neuropathy as earlier this year he had been in the hospital with orthostatic hypotension  Despite high-dose glucocorticoids he was still orthostatic indicating adrenal insufficiency was not the cause? ?  · Continue florinef at home dose, at home takes midodrine PRN--continue here  · When was on scheduled midodrine reportedly developed severe supine HTN  · BP 70s this AM however asymptomatic  Repeat still low but improved  · Will give IVF bolus  · Place compression stockings  · Place abdominal binder  · Consider renal vs cards input?

## 2022-12-06 NOTE — ASSESSMENT & PLAN NOTE
Lab Results   Component Value Date    HGBA1C 7 5 (H) 07/22/2022       Recent Labs     12/05/22  1042 12/05/22  1559 12/05/22 2046 12/06/22  0621   POCGLU 211* 87 115 107       Blood Sugar Average: Last 72 hrs:  · (P) 034 [id number]   · Endo following--currently on Lantus 24 units HS and Humalog 8 units TID with meals  · Glucose 56 most recently  D/W RN, hold mealtime insulin for lunch   D/W endo, will need them to adjust regimen

## 2022-12-06 NOTE — ASSESSMENT & PLAN NOTE
Patient has home visiting nurse, noted to weigh about 200 lb on 11/05, down to 180 lb just prior to admission  · About 20 lb unintentional weight loss over 3 weeks  · Per patient and collateral from daughter, p o  intake has not changed and patient was taking decent p o  at home  · Etiology unclear  · Nutrition consulted  · CT torso done 11/30 w/o e/o malignancy however abnormalities in pancreas somewhat consistent with his known prior episode of pancreatitis  · Per GI-- will need outpatient EGD and colonosocpy to further evaluate for unintentional weight loss  · They have also ordered fecal elastase which they will follow up outpatient  · No further imaging needed for pancreatic abnormalities at this juncture

## 2022-12-06 NOTE — PLAN OF CARE
Problem: Potential for Falls  Goal: Patient will remain free of falls  Description: INTERVENTIONS:  - Educate patient/family on patient safety including physical limitations  - Instruct patient to call for assistance with activity   - Consult OT/PT to assist with strengthening/mobility   - Keep Call bell within reach  - Keep bed low and locked with side rails adjusted as appropriate  - Keep care items and personal belongings within reach  - Initiate and maintain comfort rounds  - Make Fall Risk Sign visible to staff  - Offer Toileting every 2 Hours, in advance of need  - Initiate/Maintain bed  chair alarm  - Obtain necessary fall risk management equipment: nonskid socks  - Apply yellow socks and bracelet for high fall risk patients  - Consider moving patient to room near nurses station  Outcome: Progressing     Problem: MOBILITY - ADULT  Goal: Maintain or return to baseline ADL function  Description: INTERVENTIONS:  -  Assess patient's ability to carry out ADLs; assess patient's baseline for ADL function and identify physical deficits which impact ability to perform ADLs (bathing, care of mouth/teeth, toileting, grooming, dressing, etc )  - Assess/evaluate cause of self-care deficits   - Assess range of motion  - Assess patient's mobility; develop plan if impaired  - Assess patient's need for assistive devices and provide as appropriate  - Encourage maximum independence but intervene and supervise when necessary  - Involve family in performance of ADLs  - Assess for home care needs following discharge   - Consider OT consult to assist with ADL evaluation and planning for discharge  - Provide patient education as appropriate  Outcome: Progressing  Goal: Maintains/Returns to pre admission functional level  Description: INTERVENTIONS:  - Perform BMAT or MOVE assessment daily    - Set and communicate daily mobility goal to care team and patient/family/caregiver     - Collaborate with rehabilitation services on mobility goals if consulted  - Perform Range of Motion 3 times a day  - Reposition patient every 2 hours    - Dangle patient 3 times a day  - Stand patient 3 times a day  - Ambulate patient 3 times a day  - Out of bed to chair 3 times a day   - Out of bed for meals 3 times a day  - Out of bed for toileting  - Record patient progress and toleration of activity level   Outcome: Progressing     Problem: PAIN - ADULT  Goal: Verbalizes/displays adequate comfort level or baseline comfort level  Description: Interventions:  - Encourage patient to monitor pain and request assistance  - Assess pain using appropriate pain scale  - Administer analgesics based on type and severity of pain and evaluate response  - Implement non-pharmacological measures as appropriate and evaluate response  - Consider cultural and social influences on pain and pain management  - Notify physician/advanced practitioner if interventions unsuccessful or patient reports new pain  Outcome: Progressing     Problem: INFECTION - ADULT  Goal: Absence or prevention of progression during hospitalization  Description: INTERVENTIONS:  - Assess and monitor for signs and symptoms of infection  - Monitor lab/diagnostic results  - Monitor all insertion sites, i e  indwelling lines, tubes, and drains  - Monitor endotracheal if appropriate and nasal secretions for changes in amount and color  - [place] appropriate cooling/warming therapies per order  - Administer medications as ordered  - Instruct and encourage patient and family to use good hand hygiene technique  - Identify and instruct in appropriate isolation precautions for identified infection/condition  Outcome: Progressing  Goal: Absence of fever/infection during neutropenic period  Description: INTERVENTIONS:  - Monitor WBC    Outcome: Progressing     Problem: SAFETY ADULT  Goal: Patient will remain free of falls  Description: INTERVENTIONS:  - Educate patient/family on patient safety including physical limitations  - Instruct patient to call for assistance with activity   - Consult OT/PT to assist with strengthening/mobility   - Keep Call bell within reach  - Keep bed low and locked with side rails adjusted as appropriate  - Keep care items and personal belongings within reach  - Initiate and maintain comfort rounds  - Make Fall Risk Sign visible to staff  - Offer Toileting every 2 Hours, in advance of need  - Initiate/Maintain bed/chair alarm  - Obtain necessary fall risk management equipment: nonskid socks  - Apply yellow socks and bracelet for high fall risk patients  - Consider moving patient to room near nurses station  Outcome: Progressing  Goal: Maintain or return to baseline ADL function  Description: INTERVENTIONS:  -  Assess patient's ability to carry out ADLs; assess patient's baseline for ADL function and identify physical deficits which impact ability to perform ADLs (bathing, care of mouth/teeth, toileting, grooming, dressing, etc )  - Assess/evaluate cause of self-care deficits   - Assess range of motion  - Assess patient's mobility; develop plan if impaired  - Assess patient's need for assistive devices and provide as appropriate  - Encourage maximum independence but intervene and supervise when necessary  - Involve family in performance of ADLs  - Assess for home care needs following discharge   - Consider OT consult to assist with ADL evaluation and planning for discharge  - Provide patient education as appropriate  Outcome: Progressing  Goal: Maintains/Returns to pre admission functional level  Description: INTERVENTIONS:  - Perform BMAT or MOVE assessment daily    - Set and communicate daily mobility goal to care team and patient/family/caregiver  - Collaborate with rehabilitation services on mobility goals if consulted  - Perform Range of Motion 3 times a day  - Reposition patient every 2 hours    - Dangle patient 3 times a day  - Stand patient 3 times a day  - Ambulate patient 3 times a day  - Out of bed to chair 3 times a day   - Out of bed for meals 3 times a day  - Out of bed for toileting  - Record patient progress and toleration of activity level   Outcome: Progressing     Problem: DISCHARGE PLANNING  Goal: Discharge to home or other facility with appropriate resources  Description: INTERVENTIONS:  - Identify barriers to discharge w/patient and caregiver  - Arrange for needed discharge resources and transportation as appropriate  - Identify discharge learning needs (meds, wound care, etc )  - Arrange for interpretive services to assist at discharge as needed  - Refer to Case Management Department for coordinating discharge planning if the patient needs post-hospital services based on physician/advanced practitioner order or complex needs related to functional status, cognitive ability, or social support system  Outcome: Progressing     Problem: Knowledge Deficit  Goal: Patient/family/caregiver demonstrates understanding of disease process, treatment plan, medications, and discharge instructions  Description: Complete learning assessment and assess knowledge base  Interventions:  - Provide teaching at level of understanding  - Provide teaching via preferred learning methods  Outcome: Progressing     Problem: Nutrition/Hydration-ADULT  Goal: Nutrient/Hydration intake appropriate for improving, restoring or maintaining nutritional needs  Description: Monitor and assess patient's nutrition/hydration status for malnutrition  Collaborate with interdisciplinary team and initiate plan and interventions as ordered  Monitor patient's weight and dietary intake as ordered or per policy  Utilize nutrition screening tool and intervene as necessary  Determine patient's food preferences and provide high-protein, high-caloric foods as appropriate       INTERVENTIONS:  - Monitor oral intake, urinary output, labs, and treatment plans  - Assess nutrition and hydration status and recommend course of action  - Evaluate amount of meals eaten  - Assist patient with eating if necessary   - Allow adequate time for meals  - Recommend/ encourage appropriate diets, oral nutritional supplements, and vitamin/mineral supplements  - Order, calculate, and assess calorie counts as needed  - Recommend, monitor, and adjust tube feedings and TPN/PPN based on assessed needs  - Assess need for intravenous fluids  - Provide specific nutrition/hydration education as appropriate  - Include patient/family/caregiver in decisions related to nutrition  Outcome: Progressing

## 2022-12-06 NOTE — ASSESSMENT & PLAN NOTE
Patient noted to be hypertensive on 11/30 after receiving around the clock midodrine (at home patient is on p r n  Midodrine for SBP is less than 110)  · Patient now on p r n   Midodrine, receiving as needed with appropriate response  · Blood pressures have been somewhat labile though

## 2022-12-06 NOTE — PLAN OF CARE
Problem: PAIN - ADULT  Goal: Verbalizes/displays adequate comfort level or baseline comfort level  Description: Interventions:  - Encourage patient to monitor pain and request assistance  - Assess pain using appropriate pain scale  - Administer analgesics based on type and severity of pain and evaluate response  - Implement non-pharmacological measures as appropriate and evaluate response  - Consider cultural and social influences on pain and pain management  - Notify physician/advanced practitioner if interventions unsuccessful or patient reports new pain  Outcome: Progressing     Problem: INFECTION - ADULT  Goal: Absence or prevention of progression during hospitalization  Description: INTERVENTIONS:  - Assess and monitor for signs and symptoms of infection  - Monitor lab/diagnostic results  - Monitor all insertion sites, i e  indwelling lines, tubes, and drains  - Monitor endotracheal if appropriate and nasal secretions for changes in amount and color  - [place] appropriate cooling/warming therapies per order  - Administer medications as ordered  - Instruct and encourage patient and family to use good hand hygiene technique  - Identify and instruct in appropriate isolation precautions for identified infection/condition  Outcome: Progressing  Goal: Absence of fever/infection during neutropenic period  Description: INTERVENTIONS:  - Monitor WBC    Outcome: Progressing     Problem: DISCHARGE PLANNING  Goal: Discharge to home or other facility with appropriate resources  Description: INTERVENTIONS:  - Identify barriers to discharge w/patient and caregiver  - Arrange for needed discharge resources and transportation as appropriate  - Identify discharge learning needs (meds, wound care, etc )  - Arrange for interpretive services to assist at discharge as needed  - Refer to Case Management Department for coordinating discharge planning if the patient needs post-hospital services based on physician/advanced practitioner order or complex needs related to functional status, cognitive ability, or social support system  Outcome: Progressing

## 2022-12-06 NOTE — PROGRESS NOTES
Progress Note - Ritesh Dias 74 y o  male MRN: [id number]    Unit/Bed#: -01 Encounter: [id number]      CC: diabetes f/u    Subjective:   Morales Crawford a 74 y  o  year old male with type 1 DM and adrenal insufficiency who presented with hypotension and was found to have subacute CVA   He denies any dizziness or lightheadedness    currently patient reports the he is feeling okay he has good appetite  pt had episode of hypoglycemia today  Objective:     Vitals: Blood pressure 97/58, pulse 77, temperature 98 1 °F (36 7 °C), temperature source Oral, resp  rate 18, weight 89 1 kg (196 lb 6 9 oz), SpO2 100 %  ,Body mass index is 33 72 kg/m²  Intake/Output Summary (Last 24 hours) at 12/6/2022 1513  Last data filed at 12/6/2022 1217  Gross per 24 hour   Intake 355 ml   Output 350 ml   Net 5 ml       Physical Exam:  General Appearance: awake, appears stated age and cooperative  Head: Normocephalic, without obvious abnormality, atraumatic  Extremities: moves all extremities  Skin: Skin color and temperature normal    Pulm: no labored breathing        POC Glucose (mg/dl)   Date Value   12/06/2022 56 (L)   12/06/2022 107   12/05/2022 115   12/05/2022 87   12/05/2022 211 (H)   12/05/2022 318 (H)   12/04/2022 202 (H)   12/04/2022 145 (H)   12/04/2022 300 (H)   12/04/2022 292 (H)     Assessment and plan:  Adrenal insufficiency  Home regimen:  Hydrocortisone 10 mg b i d  In a m  And afternoon, fludrocortisone 0 2 mg daily  Patient had an episode of hypoglycemia today ( 50s) and 1 episode of hypotension  Patient was started on IV fluids by primary team  and blood pressure improved  We recommend to  continue with hydrocortisone 10 mg b i d  Today (home regimen)   Continue with fludrocortisone 0 2 mg daily    If patient continues having episodes of hypotension after IV fluids are completed we will recommend to double the dose of hydrocortisone         Type 1 diabetes  Home regimen:  Lantus 14 units, lispro 6 units t i d  Most recent A1c 7 5 07/2022  Patient had episode of hypoglycemia today (56) around lunch time  Pt reported good appetite and He ate  we recommend to decrease Lantus from 24 to 22 units at bedtime and decrease lispro from 8 units to 6 units t i d  Continue with correctional scale  Continue following with Endocrinology outpatient ( Dr Frida Johns)              Portions of the record may have been created with voice recognition software

## 2022-12-06 NOTE — WOUND OSTOMY CARE
Progress Note - Wound   Andrae Pulliam 74 y o  male MRN: [id number]  Unit/Bed#: -01 Encounter: [id number]        Assessment:   Patient is seen for wound care follow-up  73 yo male admitted to [facility] for treatment of adrenal insuffiencey  PMH: type 1 DM, CVA, orthostatic hypertension, psychiatric disorder, anemia, a-fib  Continent of bowel and bladder and min assist for turning and repositioning on regular mattress  Independent for meals  Min assist to stand  Findings:  B/L heels and ankles are dry intact and damion with old scarring noted and no skin loss or wounds present  Patient refused sacro-buttocks assessment- states no pain or wounds in area  1  Right Posterior Tibia Area POA Unstageable Pressure Injury: was a POA DTI that now presents as an unstagebale pressure injury  Area is irregular in shape with dry intact well adhered black eschar  Catie-wound intact  No drainage present  Stable in size  2  Left Posterior Ankle Area POA Unstagebable Pressure Injury: was a POA DTI that now presents as an unstageable pressure injury  Area is irregular in shape with dry intact well adhered black eschar  Catie-wound intact  No drainage present  Stable in size  No induration, fluctuance, odor, warmth/temperature differences, redness, or purulence noted to the above noted wounds and skin areas assessed  New dressings applied per orders listed below  Patient tolerated well- no s/s of non-verbal pain or discomfort observed during the encounter  Bedside nurse aware of plan of care  See flow sheets for more detailed assessment findings  Orders listed below and wound care will continue to follow, call or tiger text with questions  Skin care plans:  1-Preventative Hydraguard to bilateral sacrum, buttock and heels BID and PRN  2-Elevate heels to offload pressure  3-Ehob cushion in chair when out of bed    4-Moisturize skin daily with skin nourishing cream   5-Turn/reposition q2h or when medically stable for pressure re-distribution on skin  6  Right and left posterior distal lower leg ankle areas - Apply 3 M no sting to areas, cover with ABDs, and secure with [employer]  Change every other day or PRN displacement/ soilage  WOUNDS:  Wound 11/29/22 Pressure Injury Tibial Distal;Posterior;Right (Active)   Wound Image   12/06/22 1229   Wound Description Black;Eschar 12/06/22 1229   Pressure Injury Stage U 12/06/22 1229   Catie-wound Assessment Intact 12/06/22 1229   Wound Length (cm) 1 cm 12/06/22 1229   Wound Width (cm) 4 cm 12/06/22 1229   Wound Depth (cm) 0 cm 12/06/22 1229   Wound Surface Area (cm^2) 4 cm^2 12/06/22 1229   Wound Volume (cm^3) 0 cm^3 12/06/22 1229   Calculated Wound Volume (cm^3) 0 cm^3 12/06/22 1229   Drainage Amount None 12/06/22 1229   Non-staged Wound Description Not applicable 56/79/34 8467   Treatments Cleansed;Site care 12/06/22 1229   Dressing Protective barrier;ABD;Dry dressing 12/06/22 1229   Dressing Changed Changed 12/06/22 1229   Patient Tolerance Tolerated well 12/06/22 1229   Dressing Status Intact;Dry;Clean 12/06/22 1229       Wound 11/29/22 Pressure Injury Tibial Distal;Left;Posterior (Active)   Wound Image   11/29/22 1023   Wound Description Dry;Black;Eschar 12/06/22 1229   Pressure Injury Stage U 12/06/22 1229   Catie-wound Assessment Intact 12/06/22 1229   Wound Length (cm) 1 cm 12/06/22 1229   Wound Width (cm) 2 cm 12/06/22 1229   Wound Depth (cm) 0 cm 12/06/22 1229   Wound Surface Area (cm^2) 2 cm^2 12/06/22 1229   Wound Volume (cm^3) 0 cm^3 12/06/22 1229   Calculated Wound Volume (cm^3) 0 cm^3 12/06/22 1229   Drainage Amount None 12/06/22 1229   Non-staged Wound Description Not applicable 54/18/46 1974   Treatments Cleansed;Site care 12/06/22 1229   Dressing Protective barrier;ABD;Dry dressing 12/06/22 1229   Dressing Changed Changed 12/06/22 1229   Patient Tolerance Tolerated well 12/06/22 1229   Dressing Status Clean;Dry; Intact 12/06/22 1229 Dino Puentes RN, BSN

## 2022-12-07 PROBLEM — G93.40 ACUTE ENCEPHALOPATHY: Status: ACTIVE | Noted: 2022-12-07

## 2022-12-07 PROBLEM — R47.81 SLURRED SPEECH: Status: ACTIVE | Noted: 2022-11-28

## 2022-12-07 LAB
AMORPH URATE CRY URNS QL MICRO: ABNORMAL
ANION GAP SERPL CALCULATED.3IONS-SCNC: 4 MMOL/L (ref 4–13)
BACTERIA UR QL AUTO: ABNORMAL /HPF
BILIRUB UR QL STRIP: NEGATIVE
BUN SERPL-MCNC: 15 MG/DL (ref 5–25)
CALCIUM SERPL-MCNC: 8.7 MG/DL (ref 8.3–10.1)
CHLORIDE SERPL-SCNC: 109 MMOL/L (ref 96–108)
CLARITY UR: CLEAR
CO2 SERPL-SCNC: 25 MMOL/L (ref 21–32)
COLOR UR: COLORLESS
CREAT SERPL-MCNC: 1.05 MG/DL (ref 0.6–1.3)
GFR SERPL CREATININE-BSD FRML MDRD: 74 ML/MIN/1.73SQ M
GLUCOSE SERPL-MCNC: 129 MG/DL (ref 65–140)
GLUCOSE SERPL-MCNC: 143 MG/DL (ref 65–140)
GLUCOSE SERPL-MCNC: 167 MG/DL (ref 65–140)
GLUCOSE SERPL-MCNC: 64 MG/DL (ref 65–140)
GLUCOSE SERPL-MCNC: 65 MG/DL (ref 65–140)
GLUCOSE UR STRIP-MCNC: NEGATIVE MG/DL
HGB UR QL STRIP.AUTO: NEGATIVE
KETONES UR STRIP-MCNC: NEGATIVE MG/DL
LEUKOCYTE ESTERASE UR QL STRIP: NEGATIVE
NITRITE UR QL STRIP: NEGATIVE
NON-SQ EPI CELLS URNS QL MICRO: ABNORMAL /HPF
PH UR STRIP.AUTO: 7 [PH]
POTASSIUM SERPL-SCNC: 4.3 MMOL/L (ref 3.5–5.3)
PROT UR STRIP-MCNC: NEGATIVE MG/DL
RBC #/AREA URNS AUTO: ABNORMAL /HPF
SODIUM SERPL-SCNC: 138 MMOL/L (ref 135–147)
SP GR UR STRIP.AUTO: 1 (ref 1–1.03)
UROBILINOGEN UR STRIP-ACNC: <2 MG/DL
WBC #/AREA URNS AUTO: ABNORMAL /HPF

## 2022-12-07 RX ORDER — INSULIN GLARGINE 100 [IU]/ML
14 INJECTION, SOLUTION SUBCUTANEOUS
Status: DISCONTINUED | OUTPATIENT
Start: 2022-12-07 | End: 2022-12-08 | Stop reason: HOSPADM

## 2022-12-07 RX ORDER — MIDODRINE HYDROCHLORIDE 5 MG/1
2.5 TABLET ORAL
Status: DISCONTINUED | OUTPATIENT
Start: 2022-12-07 | End: 2022-12-08 | Stop reason: HOSPADM

## 2022-12-07 RX ORDER — HYDROCORTISONE 20 MG/1
20 TABLET ORAL
Status: DISCONTINUED | OUTPATIENT
Start: 2022-12-07 | End: 2022-12-08 | Stop reason: HOSPADM

## 2022-12-07 RX ADMIN — MIDODRINE HYDROCHLORIDE 2.5 MG: 5 TABLET ORAL at 16:49

## 2022-12-07 RX ADMIN — SENNOSIDES 17.2 MG: 8.6 TABLET, FILM COATED ORAL at 08:31

## 2022-12-07 RX ADMIN — SERTRALINE 100 MG: 100 TABLET, FILM COATED ORAL at 08:31

## 2022-12-07 RX ADMIN — DOCUSATE SODIUM 100 MG: 100 CAPSULE, LIQUID FILLED ORAL at 17:01

## 2022-12-07 RX ADMIN — Medication 12.5 MG: at 17:01

## 2022-12-07 RX ADMIN — RISPERIDONE 1 MG: 1 TABLET ORAL at 08:31

## 2022-12-07 RX ADMIN — APIXABAN 5 MG: 5 TABLET, FILM COATED ORAL at 08:30

## 2022-12-07 RX ADMIN — INSULIN LISPRO 6 UNITS: 100 INJECTION, SOLUTION INTRAVENOUS; SUBCUTANEOUS at 08:34

## 2022-12-07 RX ADMIN — VITAM B12 100 MCG: 100 TAB at 08:31

## 2022-12-07 RX ADMIN — POTASSIUM CHLORIDE 20 MEQ: 750 TABLET, EXTENDED RELEASE ORAL at 08:31

## 2022-12-07 RX ADMIN — FLUDROCORTISONE ACETATE 0.2 MG: 0.1 TABLET ORAL at 08:30

## 2022-12-07 RX ADMIN — ASPIRIN 81 MG CHEWABLE TABLET 81 MG: 81 TABLET CHEWABLE at 08:31

## 2022-12-07 RX ADMIN — RISPERIDONE 1 MG: 1 TABLET ORAL at 17:01

## 2022-12-07 RX ADMIN — CALCITRIOL CAPSULES 0.25 MCG 0.25 MCG: 0.25 CAPSULE ORAL at 08:31

## 2022-12-07 RX ADMIN — INSULIN LISPRO 6 UNITS: 100 INJECTION, SOLUTION INTRAVENOUS; SUBCUTANEOUS at 11:42

## 2022-12-07 RX ADMIN — HYDROCORTISONE 10 MG: 10 TABLET ORAL at 10:00

## 2022-12-07 RX ADMIN — APIXABAN 5 MG: 5 TABLET, FILM COATED ORAL at 17:01

## 2022-12-07 RX ADMIN — POTASSIUM CHLORIDE 20 MEQ: 750 TABLET, EXTENDED RELEASE ORAL at 17:01

## 2022-12-07 RX ADMIN — INSULIN GLARGINE 14 UNITS: 100 INJECTION, SOLUTION SUBCUTANEOUS at 21:42

## 2022-12-07 RX ADMIN — INSULIN LISPRO 6 UNITS: 100 INJECTION, SOLUTION INTRAVENOUS; SUBCUTANEOUS at 16:52

## 2022-12-07 RX ADMIN — ATORVASTATIN CALCIUM 10 MG: 10 TABLET, FILM COATED ORAL at 08:31

## 2022-12-07 RX ADMIN — INSULIN LISPRO 1 UNITS: 100 INJECTION, SOLUTION INTRAVENOUS; SUBCUTANEOUS at 21:41

## 2022-12-07 RX ADMIN — HYDROCORTISONE 20 MG: 20 TABLET ORAL at 13:28

## 2022-12-07 RX ADMIN — DOCUSATE SODIUM 100 MG: 100 CAPSULE, LIQUID FILLED ORAL at 08:31

## 2022-12-07 NOTE — ASSESSMENT & PLAN NOTE
Unclear etiology of the inciting factor  He currently denies any symptoms  His daughter reports that he also has a mild productive cough  · Infectious w/u was negative  · Chronic appearing wounds are present but do not appear acutely infected    · Was on IV hydrocortisone but now as of 12/5 was placed back on home regimen of Cortef 10 mg BID per endocrinology  · Also on home dose florinef 0 2 mg daily  · Still with ongoing orthostatic hypotension--more severe over last 48 hours (SBP in the 80s this AM similar to readings that brought him in), s/p IVF, abdominal binder and compression stockings 12/6  · Discussed with endocrinology, increase PO Cortef to 20 mg BID and monitor for improvement  · Obtain nephrology consultation for further aide in blood pressure management

## 2022-12-07 NOTE — PLAN OF CARE
Problem: Potential for Falls  Goal: Patient will remain free of falls  Description: INTERVENTIONS:  - Educate patient/family on patient safety including physical limitations  - Instruct patient to call for assistance with activity   - Consult OT/PT to assist with strengthening/mobility   - Keep Call bell within reach  - Keep bed low and locked with side rails adjusted as appropriate  - Keep care items and personal belongings within reach  - Initiate and maintain comfort rounds  - Make Fall Risk Sign visible to staff  - Offer Toileting every    Hours, in advance of need  - Initiate/Maintain   alarm  - Obtain necessary fall risk management equipment:       - Apply yellow socks and bracelet for high fall risk patients  - Consider moving patient to room near nurses station  Outcome: Progressing     Problem: MOBILITY - ADULT  Goal: Maintain or return to baseline ADL function  Description: INTERVENTIONS:  -  Assess patient's ability to carry out ADLs; assess patient's baseline for ADL function and identify physical deficits which impact ability to perform ADLs (bathing, care of mouth/teeth, toileting, grooming, dressing, etc )  - Assess/evaluate cause of self-care deficits   - Assess range of motion  - Assess patient's mobility; develop plan if impaired  - Assess patient's need for assistive devices and provide as appropriate  - Encourage maximum independence but intervene and supervise when necessary  - Involve family in performance of ADLs  - Assess for home care needs following discharge   - Consider OT consult to assist with ADL evaluation and planning for discharge  - Provide patient education as appropriate  Outcome: Progressing  Goal: Maintains/Returns to pre admission functional level  Description: INTERVENTIONS:  - Perform BMAT or MOVE assessment daily    - Set and communicate daily mobility goal to care team and patient/family/caregiver     - Collaborate with rehabilitation services on mobility goals if consulted  - Perform Range of Motion    times a day  - Reposition patient every    hours    - Dangle patient    times a day  - Stand patient      times a day  - Ambulate patient    times a day  - Out of bed to chair    times a day   - Out of bed for meals      times a day  - Out of bed for toileting  - Record patient progress and toleration of activity level   Outcome: Progressing     Problem: PAIN - ADULT  Goal: Verbalizes/displays adequate comfort level or baseline comfort level  Description: Interventions:  - Encourage patient to monitor pain and request assistance  - Assess pain using appropriate pain scale  - Administer analgesics based on type and severity of pain and evaluate response  - Implement non-pharmacological measures as appropriate and evaluate response  - Consider cultural and social influences on pain and pain management  - Notify physician/advanced practitioner if interventions unsuccessful or patient reports new pain  Outcome: Progressing     Problem: INFECTION - ADULT  Goal: Absence or prevention of progression during hospitalization  Description: INTERVENTIONS:  - Assess and monitor for signs and symptoms of infection  - Monitor lab/diagnostic results  - Monitor all insertion sites, i e  indwelling lines, tubes, and drains  - Monitor endotracheal if appropriate and nasal secretions for changes in amount and color  - [place] appropriate cooling/warming therapies per order  - Administer medications as ordered  - Instruct and encourage patient and family to use good hand hygiene technique  - Identify and instruct in appropriate isolation precautions for identified infection/condition  Outcome: Progressing  Goal: Absence of fever/infection during neutropenic period  Description: INTERVENTIONS:  - Monitor WBC    Outcome: Progressing     Problem: SAFETY ADULT  Goal: Patient will remain free of falls  Description: INTERVENTIONS:  - Educate patient/family on patient safety including physical limitations  - Instruct patient to call for assistance with activity   - Consult OT/PT to assist with strengthening/mobility   - Keep Call bell within reach  - Keep bed low and locked with side rails adjusted as appropriate  - Keep care items and personal belongings within reach  - Initiate and maintain comfort rounds  - Make Fall Risk Sign visible to staff  - Offer Toileting every      Hours, in advance of need  - Initiate/Maintain   alarm  - Obtain necessary fall risk management equipment:       - Apply yellow socks and bracelet for high fall risk patients  - Consider moving patient to room near nurses station  Outcome: Progressing  Goal: Maintain or return to baseline ADL function  Description: INTERVENTIONS:  -  Assess patient's ability to carry out ADLs; assess patient's baseline for ADL function and identify physical deficits which impact ability to perform ADLs (bathing, care of mouth/teeth, toileting, grooming, dressing, etc )  - Assess/evaluate cause of self-care deficits   - Assess range of motion  - Assess patient's mobility; develop plan if impaired  - Assess patient's need for assistive devices and provide as appropriate  - Encourage maximum independence but intervene and supervise when necessary  - Involve family in performance of ADLs  - Assess for home care needs following discharge   - Consider OT consult to assist with ADL evaluation and planning for discharge  - Provide patient education as appropriate  Outcome: Progressing  Goal: Maintains/Returns to pre admission functional level  Description: INTERVENTIONS:  - Perform BMAT or MOVE assessment daily    - Set and communicate daily mobility goal to care team and patient/family/caregiver  - Collaborate with rehabilitation services on mobility goals if consulted  - Perform Range of Motion    times a day  - Reposition patient every hours    - Dangle patient      times a day  - Stand patient    times a day  - Ambulate patient    times a day  - Out of bed to chair      times a day   - Out of bed for meals    times a day  - Out of bed for toileting  - Record patient progress and toleration of activity level   Outcome: Progressing     Problem: DISCHARGE PLANNING  Goal: Discharge to home or other facility with appropriate resources  Description: INTERVENTIONS:  - Identify barriers to discharge w/patient and caregiver  - Arrange for needed discharge resources and transportation as appropriate  - Identify discharge learning needs (meds, wound care, etc )  - Arrange for interpretive services to assist at discharge as needed  - Refer to Case Management Department for coordinating discharge planning if the patient needs post-hospital services based on physician/advanced practitioner order or complex needs related to functional status, cognitive ability, or social support system  Outcome: Progressing     Problem: Knowledge Deficit  Goal: Patient/family/caregiver demonstrates understanding of disease process, treatment plan, medications, and discharge instructions  Description: Complete learning assessment and assess knowledge base  Interventions:  - Provide teaching at level of understanding  - Provide teaching via preferred learning methods  Outcome: Progressing     Problem: Nutrition/Hydration-ADULT  Goal: Nutrient/Hydration intake appropriate for improving, restoring or maintaining nutritional needs  Description: Monitor and assess patient's nutrition/hydration status for malnutrition  Collaborate with interdisciplinary team and initiate plan and interventions as ordered  Monitor patient's weight and dietary intake as ordered or per policy  Utilize nutrition screening tool and intervene as necessary  Determine patient's food preferences and provide high-protein, high-caloric foods as appropriate       INTERVENTIONS:  - Monitor oral intake, urinary output, labs, and treatment plans  - Assess nutrition and hydration status and recommend course of action  - Evaluate amount of meals eaten  - Assist patient with eating if necessary   - Allow adequate time for meals  - Recommend/ encourage appropriate diets, oral nutritional supplements, and vitamin/mineral supplements  - Order, calculate, and assess calorie counts as needed  - Recommend, monitor, and adjust tube feedings and TPN/PPN based on assessed needs  - Assess need for intravenous fluids  - Provide specific nutrition/hydration education as appropriate  - Include patient/family/caregiver in decisions related to nutrition  Outcome: Progressing

## 2022-12-07 NOTE — ASSESSMENT & PLAN NOTE
Patient noted to be hypertensive on 11/30 after receiving around the clock midodrine (at home patient is on p r n  Midodrine for SBP is less than 110)  · Patient now on p r n   Midodrine, has been hypotensive, most notably in the morning over last 2 days   · SBP in the 80s today   · Increase Cortef to 20 mg BID and monitor for response  · Obtain nephrology consultation for aide in BP management  · Currently on PRN midodrone 5 mg for SBP > 110 and lopressor 12 5 mg BID with holding parameters  · Continue abdominal binder, compression stockings  · IVF have been d/c  · Monitor pressures closely

## 2022-12-07 NOTE — CONSULTS
Delilah Mcnally [address][age] y o  male MRN: [id number]  Unit/Bed#: -01 Encounter: [id number]    ASSESSMENT and PLAN:    74 y o  male with PMHx of DM, HTN, HPL, A rib, adrenal insuffiency, orthostatic hypotension, CVA, psychiatric disease,  who was admitted to [facility] after presenting with hypotension  A renal consultation is requested today for assistance in the management of hypotension  1) hypotension    - during admission in October, pt midodrine was held due to elevated blood pressures  Was on 10 mg TID prior  - was restarted on midodrine this admission then changed to prn on 11/30  - has received prn midodrine 12/6 and 12/4  - received vol expansion yest    Etiology of labile BP is unclear  Autonomic dysfunction, adrenal insuff likely contributing  Does not have sign of new infection as cause of hypotension  Plan:    - check PVR  - can restart midodrine 2 5 mg BID and titrate as needed  - hydrocortisone being increased by Endo  - florinef per endo  - reviewed with primary and endo team  - f/u orthostatic vitals  - check SPEP, UPEP, FLC  - unclear why pt is on calcitriol have reached out to primary team to inquire    2) history of secondary adrenal insufficiency and autonomic dysfunction    - initially pt started on hydrocortisone IV  - at home is on hydrocortisone 10 mg BID and fludrocortisone 0 2 mg daily  - on florinef  - Endo on board    3) electrolytes    - stable K  Monitor with florinef    4) renal function - creat stable    5) CVA    - Neurology on board  - felt to be more chronic CVA  6) weight loss    - CT with contrast on 11/30 - no convincing occult malignancy noted; diffusely atrophic pancreas and calculus in panc nec noted  Distended urinary bladder; sm R pl effusion  - EGD and colonoscopy as outpt    7) chronic pancreatitis     - stone at panc neck     - no intervention per GI    8) microscopic hematuria    - recheck UA    9) anemia    - etiology unclear  - consider further eval per primary team    HISTORY OF PRESENT ILLNESS:  Requesting Physician: Nely Patton MD  Reason for Consult: hypotension    Abraham Hassan is a 74 y o  male with PMHx of DM, HTN, HPL, A rib, adrenal insuffiency, orthostatic hypotension, CVA, psychiatric disease,  who was admitted to [facility] after presenting with hypotension  A renal consultation is requested today for assistance in the management of hypotension  Nephrology is on board for hypotension  Pt initially presents on 11/28 with hypotension, HA, poor PO intake  initially there was no reported fevers, congestion, chest pain, SOB, nausea, vomiting, abdominal pain per report  Today, pt denies fevers, chills, nausea, vomiting, diarrhea  Initially pt was given IVF and steroids  admission 10/2022 - hypoglycemia, required intubated for airway protection  Was on midodrine 10 mg TID on admission 10/2022 which was reduced to prn    PAST MEDICAL HISTORY:  Past Medical History:   Diagnosis Date   • Diabetes mellitus ([facility] [address] )    • Obesity, morbid ([facility] [address] ) 11/14/2022       PAST SURGICAL HISTORY:  History reviewed  No pertinent surgical history  ALLERGIES:  Allergies   Allergen Reactions   • Imipramine Other (See Comments)   • Lisinopril Other (See Comments)   • Paroxetine Other (See Comments)   • Penicillins Hives   • Rosiglitazone Other (See Comments)   • Troglitazone Other (See Comments)       SOCIAL HISTORY:  Social History     Substance and Sexual Activity   Alcohol Use Not Currently   • Alcohol/week: 5 0 standard drinks   • Types: 5 Cans of beer per week    Comment: Quit in august     Social History     Substance and Sexual Activity   Drug Use Never     Social History     Tobacco Use   Smoking Status Former   • Packs/day: 0 25   • Years: 30 00   • Pack years: 7 50   • Types: Cigarettes   Smokeless Tobacco Never       FAMILY HISTORY:  History reviewed  No pertinent family history      MEDICATIONS:    Current Facility-Administered Medications:   • acetaminophen (TYLENOL) tablet 650 mg, 650 mg, Oral, Q6H PRN, Romy Mondragon MD, 650 mg at 12/06/22 1320  •  apixaban (ELIQUIS) tablet 5 mg, 5 mg, Oral, BID, Romy Mondragon MD, 5 mg at 12/07/22 0830  •  aspirin chewable tablet 81 mg, 81 mg, Oral, Daily, Romy Mondragon MD, 81 mg at 12/07/22 0831  •  atorvastatin (LIPITOR) tablet 10 mg, 10 mg, Oral, Daily, Romy Mondragon MD, 10 mg at 12/07/22 0831  •  bisacodyl (DULCOLAX) rectal suppository 10 mg, 10 mg, Rectal, Daily PRN, Gia Almonte MD  •  calcitriol (ROCALTROL) capsule 0 25 mcg, 0 25 mcg, Oral, Daily, Romy Mondragon MD, 0 25 mcg at 12/07/22 0831  •  calcium carbonate (TUMS) chewable tablet 1,000 mg, 1,000 mg, Oral, Daily PRN, Romy Mondragon MD  •  cyanocobalamin (VITAMIN B-12) tablet 100 mcg, 100 mcg, Oral, Daily, Romy Mondragon MD, 100 mcg at 12/07/22 0831  •  docusate sodium (COLACE) capsule 100 mg, 100 mg, Oral, BID, Romy Mondragon MD, 100 mg at 12/07/22 0831  •  fludrocortisone (FLORINEF) tablet 0 2 mg, 0 2 mg, Oral, Daily, Romy Mondragon MD, 0 2 mg at 12/07/22 0830  •  hydrALAZINE (APRESOLINE) injection 5 mg, 5 mg, Intravenous, Q6H PRN, Gia Almonte MD  •  hydrocortisone (CORTEF) tablet 20 mg, 20 mg, Oral, 2 times per day, Addison Rosario PA-C, 20 mg at 12/07/22 1328  •  insulin glargine (LANTUS) subcutaneous injection 22 Units 0 22 mL, 22 Units, Subcutaneous, HS, Nikole Kurtz MD, 22 Units at 12/06/22 2138  •  insulin lispro (HumaLOG) 100 units/mL subcutaneous injection 1-5 Units, 1-5 Units, Subcutaneous, TID AC, 1 Units at 12/06/22 1811 **AND** Fingerstick Glucose (POCT), , , TID AC, Romy Mondragon MD  •  insulin lispro (HumaLOG) 100 units/mL subcutaneous injection 1-5 Units, 1-5 Units, Subcutaneous, HS, Romy Mondragon MD, 2 Units at 12/06/22 2138  •  insulin lispro (HumaLOG) 100 units/mL subcutaneous injection 6 Units, 6 Units, Subcutaneous, TID With Meals, Nikole Kurtz MD, 6 Units at 12/07/22 1142  •  metoprolol tartrate (LOPRESSOR) partial tablet 12 5 mg, 12 5 mg, Oral, BID, Britta Jay MD, 12 5 mg at 12/06/22 1730  •  midodrine (PROAMATINE) tablet 5 mg, 5 mg, Oral, TID PRN, Manoj Taylor MD, 5 mg at 12/06/22 0839  •  ondansetron (ZOFRAN) injection 4 mg, 4 mg, Intravenous, Q6H PRN, Britta Jay MD  •  polyethylene glycol (MIRALAX) packet 17 g, 17 g, Oral, Daily, Manoj Taylor MD, 17 g at 12/06/22 9259  •  potassium chloride (K-DUR,KLOR-CON) CR tablet 20 mEq, 20 mEq, Oral, BID, Britta Jay MD, 20 mEq at 12/07/22 0831  •  risperiDONE (RisperDAL) tablet 1 mg, 1 mg, Oral, BID, Britta Jay MD, 1 mg at 12/07/22 0831  •  senna (SENOKOT) tablet 17 2 mg, 2 tablet, Oral, Daily, Britta Jay MD, 17 2 mg at 12/07/22 0831  •  sertraline (ZOLOFT) tablet 100 mg, 100 mg, Oral, Daily, Britta Jay MD, 100 mg at 12/07/22 0831  •  Insert peripheral IV, , , Once **AND** sodium chloride (PF) 0 9 % injection 3 mL, 3 mL, Intravenous, Q1H PRN, Windy Jimenez DO    REVIEW OF SYSTEMS:    All the systems were reviewed and were negative except as documented on the HPI      PHYSICAL EXAM:  Current Weight: Weight - Scale: 89 1 kg (196 lb 6 9 oz)  First Weight: Weight - Scale: 85 5 kg (188 lb 7 9 oz)  Vitals:    12/07/22 0647 12/07/22 0649 12/07/22 0651 12/07/22 0735   BP: 110/54 (!) 77/41 (!) 86/42 (!) 85/52   Pulse: 66 70 67 67   Resp:       Temp:    98 4 °F (36 9 °C)   TempSrc:       SpO2: 98% 98% 100% 100%   Weight:           Intake/Output Summary (Last 24 hours) at 12/7/2022 1425  Last data filed at 12/6/2022 1527  Gross per 24 hour   Intake 1000 ml   Output --   Net 1000 ml     Physical Exam  General: NAD  Skin: no rash  Eyes: anicteric sclera  ENT: moist mucous membrane  Neck: supple  Chest: CTA b/l, no ronchii, no wheeze, no rubs, no rales  CVS: s1s2, no murmur, no gallop, no rub  Abdomen: soft, nontender, nl sounds  Extremities: no edema LE b/l  : no sanchez  Neuro: AAOX3  Psych: normal affect      Invasive Devices:      Lab Results:   Results from last 7 days   Lab Units 12/07/22  1113 12/06/22  0511 12/04/22  0529 12/03/22  1023 12/02/22  0623 12/01/22  0524   WBC Thousand/uL  --  9 08 8 61  --   --  8 59   HEMOGLOBIN g/dL  --  10 5* 11 0*  --   --  10 6*   HEMATOCRIT %  --  34 5* 35 5*  --   --  35 0*   PLATELETS Thousands/uL  --  205 217  --   --  200   POTASSIUM mmol/L 4 3 4 3 4 0   < > 4 9 4 2   CHLORIDE mmol/L 109* 110* 107   < > 108 104   CO2 mmol/L 25 21 27   < > 27 28   BUN mg/dL 15 16 16   < > 23 24   CREATININE mg/dL 1 05 1 10 0 96   < > 1 00 1 16   CALCIUM mg/dL 8 7 8 5 8 9   < > 8 9 8 8   ALK PHOS U/L  --   --   --   --  72  --    ALT U/L  --   --   --   --  27  --    AST U/L  --   --   --   --  42  --     < > = values in this interval not displayed

## 2022-12-07 NOTE — ASSESSMENT & PLAN NOTE
· Pt maintained on Lopressor 12 5 mg BID for rate control, continue for now with holding parameters for hypotension   · Continue Eliquis for [facility]

## 2022-12-07 NOTE — ASSESSMENT & PLAN NOTE
Possibly due to diabetes w diabetic autonomic neuropathy as earlier this year he had been in the hospital with orthostatic hypotension  Despite high-dose glucocorticoids he was still orthostatic indicating adrenal insufficiency possibly not the cause   · Continue florinef at home dose, at home takes midodrine PRN--continue here  · When was on scheduled midodrine reportedly developed severe supine HTN  · SBP 80s this AM however asymptomatic     · S/p IVF bolus, additional fluids now discontinued   · Continue abdominal binder and compression stockings  · Obtain renal consultation for further aide in persistent hypotension as noted above

## 2022-12-07 NOTE — PROGRESS NOTES
Progress Note - Nikole Stokes 74 y o  male MRN: [id number]    Unit/Bed#: -01 Encounter: [id number]      CC: diabetes f/u    Subjective:   Pepe Lucero a 74 y  o  year old male with type 1 DM and adrenal insufficiency who presented with hypotension and was found to have subacute CVA   He denies any dizziness or lightheadedness    currently patient reports the he is feeling okay he has good appetite  pt had episode of hypoglycemia today       Objective:     Vitals: Blood pressure (!) 85/52, pulse 67, temperature 98 4 °F (36 9 °C), resp  rate 18, weight 89 1 kg (196 lb 6 9 oz), SpO2 100 %  ,Body mass index is 33 72 kg/m²  Intake/Output Summary (Last 24 hours) at 12/7/2022 1236  Last data filed at 12/6/2022 1527  Gross per 24 hour   Intake 1000 ml   Output --   Net 1000 ml       Physical Exam:  General Appearance: awake, appears stated age and cooperative  Head: Normocephalic, without obvious abnormality, atraumatic  Extremities: moves all extremities  Skin: Skin color and temperature normal    Pulm: no labored breathing        POC Glucose (mg/dl)   Date Value   12/07/2022 143 (H)   12/07/2022 65   12/06/2022 257 (H)   12/06/2022 203 (H)   12/06/2022 56 (L)   12/06/2022 107   12/05/2022 115   12/05/2022 87   12/05/2022 211 (H)   12/05/2022 318 (H)     Assessment and plan:  Adrenal insufficiency  Home regimen:  Hydrocortisone 10 mg b i d  In a m  And afternoon, fludrocortisone 0 2 mg daily  Patient had an episode of hypoglycemia today ( 60s) and  episode of hypotension  IV fluids have been completed ( 1 lt)   Recommend to increase Hydrocortisone from 10 bid to 20 bid  Continue with  fludrocortisone 0 2 mg daily      Continue to monitor BP        Type 1 diabetes  Home regimen:  Lantus 14 units, lispro 6 units t i d   Most recent A1c 7 5 07/2022  Patient had episode of hypoglycemia today fasting in the AM    Pt reports good appetite  Inpt regimen: Lantus 22 units bedtime and lispro 6 units tid  Recommend to decrease Lantus from 22 to 14 units at bedtime  Recommend to continue with lispro 6 units tid with meals   Endocrinology outpatient ( Dr Lobato)           Portions of the record may have been created with voice recognition software

## 2022-12-07 NOTE — PROGRESS NOTES
Reny Stevens [address] 1955, 74 y o  male MRN: [id number]  Unit/Bed#: [facility] Encounter: [id number]  Primary Care Provider: Kevin Alexandra MD   Date and time admitted to hospital: 11/28/2022  3:27 PM      DOS: 12/7/2022  * Adrenal insufficiency   Assessment & Plan  Unclear etiology of the inciting factor  He currently denies any symptoms  His daughter reports that he also has a mild productive cough  · Infectious w/u was negative  · Chronic appearing wounds are present but do not appear acutely infected  · Was on IV hydrocortisone but now as of 12/5 was placed back on home regimen of Cortef 10 mg BID per endocrinology  · Also on home dose florinef 0 2 mg daily  · Still with ongoing orthostatic hypotension--more severe over last 48 hours (SBP in the 80s this AM similar to readings that brought him in), s/p IVF, abdominal binder and compression stockings 12/6  · Discussed with endocrinology, increase PO Cortef to 20 mg BID and monitor for improvement  · Obtain nephrology consultation for further aide in blood pressure management    Essential hypertension  Assessment & Plan  Patient noted to be hypertensive on 11/30 after receiving around the clock midodrine (at home patient is on p r n  Midodrine for SBP is less than 110)  · Patient now on p r n   Midodrine, has been hypotensive, most notably in the morning over last 2 days   · SBP in the 80s today   · Increase Cortef to 20 mg BID and monitor for response  · Obtain nephrology consultation for aide in BP management  · Currently on PRN midodrone 5 mg for SBP > 110 and lopressor 12 5 mg BID with holding parameters  · Continue abdominal binder, compression stockings  · IVF have been d/c  · Monitor pressures closely       Acute encephalopathy  Assessment & Plan  · Daughter noting pt with increased confusion, forgetfulness  · CT head obtained 12/6 negative for acute findings  · Could be multifactorial in setting of hospital environment, recent IV steroids, hypotension   · Continue supportive care and plan as above  · Monitor mentation closely     Unspecified protein-calorie malnutrition (HCC)  Assessment & Plan  Malnutrition Findings:   Adult Malnutrition type: Acute illness  Adult Degree of Malnutrition: Unspecified protein calorie malnutrition  Malnutrition Characteristics: Muscle loss, Weight loss                  360 Statement: in setting of unknown etiology - 9% wt loss x 2 weeks, (11/14/22 208 lbs - 11/30/22 188 5 lbs),  muscle wasting/slight depression/hollowing in temple region, treated with diet, PT refusing supplements    BMI Findings: Body mass index is 33 72 kg/m²  Unintentional weight loss  Assessment & Plan  Patient has home visiting nurse, noted to weigh about 200 lb on 11/05, down to 180 lb just prior to admission  · About 20 lb unintentional weight loss over 3 weeks  · Per patient and collateral from daughter, p o  intake has not changed and patient was taking decent p o  at home  · Etiology unclear  · Nutrition consulted  · CT torso done 11/30 w/o e/o malignancy however abnormalities in pancreas somewhat consistent with his known prior episode of pancreatitis  · Per GI-- will need outpatient EGD and colonosocpy to further evaluate for unintentional weight loss  · They have also ordered fecal elastase which they will follow up outpatient  · No further imaging needed for pancreatic abnormalities at this juncture  Slurred speech  Assessment & Plan  Head CT done on presentation, initial read c/f subacute vs chronic lacunar cva identified since last month - per neurology review of this plus MRI brain done, findings c/w chronic infarcts, no subacute or acute findings  · Continue aspirin and eliquis  · MRI brain c/w chronic infarcts only  · Neurology consulted, given chronic infarcts without any concern for subacute or acute findings, continue current management     · Daughter reported she felt speech was slightly more slurred on phone on 12/6 requiring STAT CT head to be obtained which was negative  Possibly in setting of hypotension vs  AM hypoglycemia  Continue management as noted above  Speech is fluent today      Orthostatic hypotension  Assessment & Plan  Possibly due to diabetes w diabetic autonomic neuropathy as earlier this year he had been in the hospital with orthostatic hypotension  Despite high-dose glucocorticoids he was still orthostatic indicating adrenal insufficiency possibly not the cause   · Continue florinef at home dose, at home takes midodrine PRN--continue here  · When was on scheduled midodrine reportedly developed severe supine HTN  · SBP 80s this AM however asymptomatic     · S/p IVF bolus, additional fluids now discontinued   · Continue abdominal binder and compression stockings  · Obtain renal consultation for further aide in persistent hypotension as noted above    Psychiatric disorder  Assessment & Plan  · Mood appears stable at this time  · Continue risperidone 1 mg BID and sertraline 100 mg daily     Anemia of chronic disease  Assessment & Plan  Hb is above his baseline, noted to be 10 5 most recently   · Continue B12 supplementation    Paroxysmal atrial fibrillation   Assessment & Plan  · Pt maintained on Lopressor 12 5 mg BID for rate control, continue for now with holding parameters for hypotension   · Continue Eliquis for [facility]    Insulin dependent type 1 diabetes mellitus   Assessment & Plan  Lab Results   Component Value Date    HGBA1C 7 5 (H) 07/22/2022       Recent Labs     12/06/22  1122 12/06/22  1642 12/06/22  2118 12/07/22  0633   POCGLU 56* 203* 257* 65       Blood Sugar Average: Last 72 hrs:  · (P) 377 [id number]   · Endo following,  · Due to AM hypoglycemia, regimen recently decreased to Lantus 22 units QHS with humalog 6 units TID with meals   · Continue SSI coverage  · QID glucose checks  · Consistent carb diet   · Monitor and adjust regimen as needed    VTE Pharmacologic Prophylaxis:   Moderate Risk (Score 3-4) - Pharmacological DVT Prophylaxis Ordered: apixaban (Eliquis)  Patient Centered Rounds: I evaluated the patient without nursing staff present due to speaking to nurse outside patient's room   Discussions with Specialists or Other Care Team Provider: Discussed with RN, CM, nephrology, endocrine and reviewed previous notes     Education and Discussions with Family / Patient: Updated  (daughter) via phone  Sonia     Time Spent for Care: 30 minutes  More than 50% of total time spent on counseling and coordination of care as described above  Current Length of Stay: 9 day(s)  Current Patient Status: Inpatient   Certification Statement: The patient will continue to require additional inpatient hospital stay due to hypotension, nephrology consultation  Discharge Plan: Anticipate discharge in 48-72 hrs to home  Code Status: Level 1 - Full Code    Subjective:   Pt reports that he feels well today  States that he slept well and has a good appetite  Denies any pain, dizziness or lightheadedness  Pt's daughter concerned that he has been more confused over the last few days  Objective:     Vitals:   Temp (24hrs), Av °F (36 7 °C), Min:97 7 °F (36 5 °C), Max:98 4 °F (36 9 °C)    Temp:  [97 7 °F (36 5 °C)-98 4 °F (36 9 °C)] 98 4 °F (36 9 °C)  HR:  [66-87] 67  Resp:  [18] 18  BP: ()/(41-86) 85/52  SpO2:  [97 %-100 %] 100 %  Body mass index is 33 72 kg/m²  Input and Output Summary (last 24 hours): Intake/Output Summary (Last 24 hours) at 2022 1104  Last data filed at 2022 1527  Gross per 24 hour   Intake 1230 ml   Output 350 ml   Net 880 ml       Physical Exam:   Physical Exam  Vitals reviewed  Constitutional:       General: He is not in acute distress  Appearance: He is not toxic-appearing  Comments: Pt is in no acute distress lying in his hospital bed resting comfortably   Alert and answering questions appropriately, speech is fluent, no slurring noted today   HENT:      Head: Normocephalic and atraumatic  Cardiovascular:      Rate and Rhythm: Normal rate and regular rhythm  Pulses: Normal pulses  Pulmonary:      Effort: Pulmonary effort is normal  No respiratory distress  Abdominal:      General: Bowel sounds are normal  There is no distension  Palpations: Abdomen is soft  Musculoskeletal:      Comments: Chronic lower extremity wounds with dressing c/d/i   Skin:     General: Skin is warm and dry  Neurological:      Mental Status: He is alert  Additional Data:     Labs:  Results from last 7 days   Lab Units 12/06/22  0511   WBC Thousand/uL 9 08   HEMOGLOBIN g/dL 10 5*   HEMATOCRIT % 34 5*   PLATELETS Thousands/uL 205   NEUTROS PCT % 53   LYMPHS PCT % 28   MONOS PCT % 17*   EOS PCT % 2     Results from last 7 days   Lab Units 12/06/22  0511 12/03/22  1023 12/02/22  0623   SODIUM mmol/L 139   < > 139   POTASSIUM mmol/L 4 3   < > 4 9   CHLORIDE mmol/L 110*   < > 108   CO2 mmol/L 21   < > 27   BUN mg/dL 16   < > 23   CREATININE mg/dL 1 10   < > 1 00   ANION GAP mmol/L 8   < > 4   CALCIUM mg/dL 8 5   < > 8 9   ALBUMIN g/dL  --   --  2 9*   TOTAL BILIRUBIN mg/dL  --   --  0 37   ALK PHOS U/L  --   --  72   ALT U/L  --   --  27   AST U/L  --   --  42   GLUCOSE RANDOM mg/dL 123   < > 170*    < > = values in this interval not displayed           Results from last 7 days   Lab Units 12/07/22  1050 12/07/22  0633 12/06/22  2118 12/06/22  1642 12/06/22  1122 12/06/22  0621 12/05/22  2046 12/05/22  1559 12/05/22  1042 12/05/22  0618 12/04/22  2102 12/04/22  1557   POC GLUCOSE mg/dl 143* 65 257* 203* 56* 107 115 87 211* 318* 202* 145*         Results from last 7 days   Lab Units 11/30/22  1105   PROCALCITONIN ng/ml <0 05       Lines/Drains:  Invasive Devices     Peripheral Intravenous Line  Duration           Peripheral IV 12/03/22 Left Wrist 4 days                      Imaging: Reviewed radiology reports from this admission including: CT head and MRI brain    Recent Cultures (last 7 days):         Last 24 Hours Medication List:   Current Facility-Administered Medications   Medication Dose Route Frequency Provider Last Rate   • acetaminophen  650 mg Oral Q6H PRN Tra Mendiola MD     • apixaban  5 mg Oral BID Tra Mendiola MD     • aspirin  81 mg Oral Daily Tra Mendiola MD     • atorvastatin  10 mg Oral Daily Tra Mendiola MD     • bisacodyl  10 mg Rectal Daily PRN Kim Short MD     • calcitriol  0 25 mcg Oral Daily Tra Mendiola MD     • calcium carbonate  1,000 mg Oral Daily PRN Tra Mendiola MD     • cyanocobalamin  100 mcg Oral Daily Tra Mendiola MD     • docusate sodium  100 mg Oral BID Tra Mendiola MD     • fludrocortisone  0 2 mg Oral Daily Tra Mendiola MD     • hydrALAZINE  5 mg Intravenous Q6H PRN Kim Short MD     • hydrocortisone  20 mg Oral 2 times per day Layla Rinne, PA-C     • insulin glargine  22 Units Subcutaneous HS Calin Eagle MD     • insulin lispro  1-5 Units Subcutaneous TID AC Tra Mendiola MD     • insulin lispro  1-5 Units Subcutaneous HS Tra Mendiola MD     • insulin lispro  6 Units Subcutaneous TID With Meals Calin Eagle MD     • metoprolol tartrate  12 5 mg Oral BID Tra Mendiola MD     • midodrine  5 mg Oral TID PRN Kim Short MD     • ondansetron  4 mg Intravenous Q6H PRN Tra Mendiola MD     • polyethylene glycol  17 g Oral Daily Kim Short MD     • potassium chloride  20 mEq Oral BID Tra Mendiola MD     • risperiDONE  1 mg Oral BID Tra Mendiola MD     • senna  2 tablet Oral Daily Tra Mendiola MD     • sertraline  100 mg Oral Daily Tra Mendiola MD     • sodium chloride (PF)  3 mL Intravenous Q1H PRN Darren Lobato DO          Today, Patient Was Seen By: Stella Degroot PA-C    **Please Note: This note may have been constructed using a voice recognition system  **

## 2022-12-07 NOTE — PROGRESS NOTES
I tried multiple attempts to get blood work but was unsuccessful  RN was notified and made aware       Jorje Haddad, Patient Care Assistant

## 2022-12-07 NOTE — ASSESSMENT & PLAN NOTE
Head CT done on presentation, initial read c/f subacute vs chronic lacunar cva identified since last month - per neurology review of this plus MRI brain done, findings c/w chronic infarcts, no subacute or acute findings  · Continue aspirin and eliquis  · MRI brain c/w chronic infarcts only  · Neurology consulted, given chronic infarcts without any concern for subacute or acute findings, continue current management  · Daughter reported she felt speech was slightly more slurred on phone on 12/6 requiring STAT CT head to be obtained which was negative  Possibly in setting of hypotension vs  AM hypoglycemia  Continue management as noted above   Speech is fluent today

## 2022-12-07 NOTE — PLAN OF CARE
Problem: Potential for Falls  Goal: Patient will remain free of falls  Description: INTERVENTIONS:  - Educate patient/family on patient safety including physical limitations  - Instruct patient to call for assistance with activity   - Consult OT/PT to assist with strengthening/mobility   - Keep Call bell within reach  - Keep bed low and locked with side rails adjusted as appropriate  - Keep care items and personal belongings within reach  - Initiate and maintain comfort rounds  - Make Fall Risk Sign visible to staff  - Offer Toileting every  Hours, in advance of need  - Initiate/Maintain alarm  - Obtain necessary fall risk management equipment:   - Apply yellow socks and bracelet for high fall risk patients  - Consider moving patient to room near nurses station  Outcome: Not Progressing     Problem: MOBILITY - ADULT  Goal: Maintain or return to baseline ADL function  Description: INTERVENTIONS:  -  Assess patient's ability to carry out ADLs; assess patient's baseline for ADL function and identify physical deficits which impact ability to perform ADLs (bathing, care of mouth/teeth, toileting, grooming, dressing, etc )  - Assess/evaluate cause of self-care deficits   - Assess range of motion  - Assess patient's mobility; develop plan if impaired  - Assess patient's need for assistive devices and provide as appropriate  - Encourage maximum independence but intervene and supervise when necessary  - Involve family in performance of ADLs  - Assess for home care needs following discharge   - Consider OT consult to assist with ADL evaluation and planning for discharge  - Provide patient education as appropriate  Outcome: Not Progressing  Goal: Maintains/Returns to pre admission functional level  Description: INTERVENTIONS:  - Perform BMAT or MOVE assessment daily    - Set and communicate daily mobility goal to care team and patient/family/caregiver     - Collaborate with rehabilitation services on mobility goals if consulted  - Perform Range of Motion  times a day  - Reposition patient every  hours    - Dangle patient  times a day  - Stand patient  times a day  - Ambulate patient  times a day  - Out of bed to chair  times a day   - Out of bed for meals  times a day  - Out of bed for toileting  - Record patient progress and toleration of activity level   Outcome: Not Progressing     Problem: PAIN - ADULT  Goal: Verbalizes/displays adequate comfort level or baseline comfort level  Description: Interventions:  - Encourage patient to monitor pain and request assistance  - Assess pain using appropriate pain scale  - Administer analgesics based on type and severity of pain and evaluate response  - Implement non-pharmacological measures as appropriate and evaluate response  - Consider cultural and social influences on pain and pain management  - Notify physician/advanced practitioner if interventions unsuccessful or patient reports new pain  Outcome: Not Progressing     Problem: INFECTION - ADULT  Goal: Absence or prevention of progression during hospitalization  Description: INTERVENTIONS:  - Assess and monitor for signs and symptoms of infection  - Monitor lab/diagnostic results  - Monitor all insertion sites, i e  indwelling lines, tubes, and drains  - Monitor endotracheal if appropriate and nasal secretions for changes in amount and color  - [place] appropriate cooling/warming therapies per order  - Administer medications as ordered  - Instruct and encourage patient and family to use good hand hygiene technique  - Identify and instruct in appropriate isolation precautions for identified infection/condition  Outcome: Not Progressing  Goal: Absence of fever/infection during neutropenic period  Description: INTERVENTIONS:  - Monitor WBC    Outcome: Not Progressing     Problem: SAFETY ADULT  Goal: Patient will remain free of falls  Description: INTERVENTIONS:  - Educate patient/family on patient safety including physical limitations  - Instruct patient to call for assistance with activity   - Consult OT/PT to assist with strengthening/mobility   - Keep Call bell within reach  - Keep bed low and locked with side rails adjusted as appropriate  - Keep care items and personal belongings within reach  - Initiate and maintain comfort rounds  - Make Fall Risk Sign visible to staff  - Offer Toileting every  Hours, in advance of need  - Initiate/Maintain alarm  - Obtain necessary fall risk management equipment:   - Apply yellow socks and bracelet for high fall risk patients  - Consider moving patient to room near nurses station  Outcome: Not Progressing  Goal: Maintain or return to baseline ADL function  Description: INTERVENTIONS:  -  Assess patient's ability to carry out ADLs; assess patient's baseline for ADL function and identify physical deficits which impact ability to perform ADLs (bathing, care of mouth/teeth, toileting, grooming, dressing, etc )  - Assess/evaluate cause of self-care deficits   - Assess range of motion  - Assess patient's mobility; develop plan if impaired  - Assess patient's need for assistive devices and provide as appropriate  - Encourage maximum independence but intervene and supervise when necessary  - Involve family in performance of ADLs  - Assess for home care needs following discharge   - Consider OT consult to assist with ADL evaluation and planning for discharge  - Provide patient education as appropriate  Outcome: Not Progressing  Goal: Maintains/Returns to pre admission functional level  Description: INTERVENTIONS:  - Perform BMAT or MOVE assessment daily    - Set and communicate daily mobility goal to care team and patient/family/caregiver  - Collaborate with rehabilitation services on mobility goals if consulted  - Perform Range of Motion  times a day  - Reposition patient every  hours    - Dangle patient  times a day  - Stand patient  times a day  - Ambulate patient  times a day  - Out of bed to chair  times a day   - Out of bed for meals times a day  - Out of bed for toileting  - Record patient progress and toleration of activity level   Outcome: Not Progressing     Problem: DISCHARGE PLANNING  Goal: Discharge to home or other facility with appropriate resources  Description: INTERVENTIONS:  - Identify barriers to discharge w/patient and caregiver  - Arrange for needed discharge resources and transportation as appropriate  - Identify discharge learning needs (meds, wound care, etc )  - Arrange for interpretive services to assist at discharge as needed  - Refer to Case Management Department for coordinating discharge planning if the patient needs post-hospital services based on physician/advanced practitioner order or complex needs related to functional status, cognitive ability, or social support system  Outcome: Not Progressing     Problem: Knowledge Deficit  Goal: Patient/family/caregiver demonstrates understanding of disease process, treatment plan, medications, and discharge instructions  Description: Complete learning assessment and assess knowledge base  Interventions:  - Provide teaching at level of understanding  - Provide teaching via preferred learning methods  Outcome: Not Progressing     Problem: Nutrition/Hydration-ADULT  Goal: Nutrient/Hydration intake appropriate for improving, restoring or maintaining nutritional needs  Description: Monitor and assess patient's nutrition/hydration status for malnutrition  Collaborate with interdisciplinary team and initiate plan and interventions as ordered  Monitor patient's weight and dietary intake as ordered or per policy  Utilize nutrition screening tool and intervene as necessary  Determine patient's food preferences and provide high-protein, high-caloric foods as appropriate       INTERVENTIONS:  - Monitor oral intake, urinary output, labs, and treatment plans  - Assess nutrition and hydration status and recommend course of action  - Evaluate amount of meals eaten  - Assist patient with eating if necessary   - Allow adequate time for meals  - Recommend/ encourage appropriate diets, oral nutritional supplements, and vitamin/mineral supplements  - Order, calculate, and assess calorie counts as needed  - Recommend, monitor, and adjust tube feedings and TPN/PPN based on assessed needs  - Assess need for intravenous fluids  - Provide specific nutrition/hydration education as appropriate  - Include patient/family/caregiver in decisions related to nutrition  Outcome: Not Progressing

## 2022-12-07 NOTE — ASSESSMENT & PLAN NOTE
Lab Results   Component Value Date    HGBA1C 7 5 (H) 07/22/2022       Recent Labs     12/06/22  1122 12/06/22  1642 12/06/22  2118 12/07/22  0633   POCGLU 56* 203* 257* 65       Blood Sugar Average: Last 72 hrs:  · (P) 103 [id number]   · Endo following,  · Due to AM hypoglycemia, regimen recently decreased to Lantus 22 units QHS with humalog 6 units TID with meals   · Continue SSI coverage  · QID glucose checks  · Consistent carb diet   · Monitor and adjust regimen as needed

## 2022-12-07 NOTE — ASSESSMENT & PLAN NOTE
· Daughter noting pt with increased confusion, forgetfulness  · CT head obtained 12/6 negative for acute findings  · Could be multifactorial in setting of hospital environment, recent IV steroids, hypotension   · Continue supportive care and plan as above  · Monitor mentation closely

## 2022-12-08 VITALS
OXYGEN SATURATION: 97 % | TEMPERATURE: 97.8 F | DIASTOLIC BLOOD PRESSURE: 85 MMHG | RESPIRATION RATE: 16 BRPM | HEART RATE: 78 BPM | WEIGHT: 205.69 LBS | SYSTOLIC BLOOD PRESSURE: 163 MMHG | BODY MASS INDEX: 35.31 KG/M2

## 2022-12-08 PROBLEM — R47.81 SLURRED SPEECH: Status: RESOLVED | Noted: 2022-11-28 | Resolved: 2022-12-08

## 2022-12-08 LAB
ANION GAP SERPL CALCULATED.3IONS-SCNC: 3 MMOL/L (ref 4–13)
BUN SERPL-MCNC: 14 MG/DL (ref 5–25)
CALCIUM SERPL-MCNC: 8.9 MG/DL (ref 8.3–10.1)
CHLORIDE SERPL-SCNC: 109 MMOL/L (ref 96–108)
CO2 SERPL-SCNC: 27 MMOL/L (ref 21–32)
CREAT SERPL-MCNC: 0.93 MG/DL (ref 0.6–1.3)
ELASTASE PANC STL-MCNT: <50 UG ELAST./G
GFR SERPL CREATININE-BSD FRML MDRD: 85 ML/MIN/1.73SQ M
GLUCOSE SERPL-MCNC: 110 MG/DL (ref 65–140)
GLUCOSE SERPL-MCNC: 111 MG/DL (ref 65–140)
GLUCOSE SERPL-MCNC: 144 MG/DL (ref 65–140)
GLUCOSE SERPL-MCNC: 239 MG/DL (ref 65–140)
POTASSIUM SERPL-SCNC: 4.1 MMOL/L (ref 3.5–5.3)
SODIUM SERPL-SCNC: 139 MMOL/L (ref 135–147)

## 2022-12-08 RX ORDER — MIDODRINE HYDROCHLORIDE 2.5 MG/1
2.5 TABLET ORAL
Qty: 60 TABLET | Refills: 0 | Status: SHIPPED | OUTPATIENT
Start: 2022-12-08

## 2022-12-08 RX ORDER — INSULIN GLARGINE 100 [IU]/ML
14 INJECTION, SOLUTION SUBCUTANEOUS
Start: 2022-12-08 | End: 2023-01-07

## 2022-12-08 RX ORDER — INSULIN LISPRO 100 [IU]/ML
6 INJECTION, SOLUTION INTRAVENOUS; SUBCUTANEOUS
Start: 2022-12-08

## 2022-12-08 RX ADMIN — ATORVASTATIN CALCIUM 10 MG: 10 TABLET, FILM COATED ORAL at 08:45

## 2022-12-08 RX ADMIN — INSULIN LISPRO 2 UNITS: 100 INJECTION, SOLUTION INTRAVENOUS; SUBCUTANEOUS at 11:37

## 2022-12-08 RX ADMIN — INSULIN LISPRO 6 UNITS: 100 INJECTION, SOLUTION INTRAVENOUS; SUBCUTANEOUS at 08:46

## 2022-12-08 RX ADMIN — MIDODRINE HYDROCHLORIDE 2.5 MG: 5 TABLET ORAL at 08:45

## 2022-12-08 RX ADMIN — FLUDROCORTISONE ACETATE 0.2 MG: 0.1 TABLET ORAL at 08:46

## 2022-12-08 RX ADMIN — INSULIN LISPRO 6 UNITS: 100 INJECTION, SOLUTION INTRAVENOUS; SUBCUTANEOUS at 11:37

## 2022-12-08 RX ADMIN — HYDROCORTISONE 20 MG: 20 TABLET ORAL at 08:47

## 2022-12-08 RX ADMIN — HYDROCORTISONE 20 MG: 20 TABLET ORAL at 14:13

## 2022-12-08 RX ADMIN — CALCITRIOL CAPSULES 0.25 MCG 0.25 MCG: 0.25 CAPSULE ORAL at 08:45

## 2022-12-08 RX ADMIN — VITAM B12 100 MCG: 100 TAB at 08:45

## 2022-12-08 RX ADMIN — SENNOSIDES 17.2 MG: 8.6 TABLET, FILM COATED ORAL at 08:45

## 2022-12-08 RX ADMIN — RISPERIDONE 1 MG: 1 TABLET ORAL at 08:46

## 2022-12-08 RX ADMIN — APIXABAN 5 MG: 5 TABLET, FILM COATED ORAL at 08:45

## 2022-12-08 RX ADMIN — POTASSIUM CHLORIDE 20 MEQ: 750 TABLET, EXTENDED RELEASE ORAL at 08:45

## 2022-12-08 RX ADMIN — DOCUSATE SODIUM 100 MG: 100 CAPSULE, LIQUID FILLED ORAL at 08:45

## 2022-12-08 RX ADMIN — SERTRALINE 100 MG: 100 TABLET, FILM COATED ORAL at 08:45

## 2022-12-08 RX ADMIN — ASPIRIN 81 MG CHEWABLE TABLET 81 MG: 81 TABLET CHEWABLE at 08:46

## 2022-12-08 NOTE — ASSESSMENT & PLAN NOTE
Possibly due to diabetes w diabetic autonomic neuropathy as earlier this year he had been in the hospital with orthostatic hypotension   Despite high-dose glucocorticoids he was still orthostatic indicating adrenal insufficiency possibly not the cause   · Continue florinef at home dose, at home takes midodrine PRN--switched to midodrine 2 5 mg BID per nephrology with improvement of pressures, continue on discharge    · When was on scheduled midodrine reportedly developed severe supine HTN but this was reportedly on higher dose   · Continue abdominal binder and compression stockings  · Follow up with PCP and endocrinology

## 2022-12-08 NOTE — ASSESSMENT & PLAN NOTE
Unclear etiology of the inciting factor  He currently denies any symptoms  His daughter reports that he also has a mild productive cough  · Infectious w/u was negative  · Chronic appearing wounds are present but do not appear acutely infected  · Was on IV hydrocortisone but as of 12/5 was placed back on home regimen of Cortef 10 mg BID per endocrinology  · Also on home dose florinef 0 2 mg daily  · Noted to have orthostatic hypotension on 12/6, therefore he was given IVF bolus, abdominal binder and compression stockings  Hydrocortisone also increased to 20 mg BID x 2 days, transition back to home dose tomorrow  Midodrine added scheduled at 2 5 mg BID to continue at home as well  Blood pressures overall are much improved  Monitor blood pressures closely at home

## 2022-12-08 NOTE — ASSESSMENT & PLAN NOTE
Hb is above his baseline, noted to be 10 5 most recently   · Continue B12 supplementation  · Follow up outpatient

## 2022-12-08 NOTE — PLAN OF CARE
Problem: Potential for Falls  Goal: Patient will remain free of falls  Description: INTERVENTIONS:  - Educate patient/family on patient safety including physical limitations  - Instruct patient to call for assistance with activity   - Consult OT/PT to assist with strengthening/mobility   - Keep Call bell within reach  - Keep bed low and locked with side rails adjusted as appropriate  - Keep care items and personal belongings within reach  - Initiate and maintain comfort rounds  - Make Fall Risk Sign visible to staff  - Apply yellow socks and bracelet for high fall risk patients  - Consider moving patient to room near nurses station  Outcome: Progressing     Problem: MOBILITY - ADULT  Goal: Maintain or return to baseline ADL function  Description: INTERVENTIONS:  -  Assess patient's ability to carry out ADLs; assess patient's baseline for ADL function and identify physical deficits which impact ability to perform ADLs (bathing, care of mouth/teeth, toileting, grooming, dressing, etc )  - Assess/evaluate cause of self-care deficits   - Assess range of motion  - Assess patient's mobility; develop plan if impaired  - Assess patient's need for assistive devices and provide as appropriate  - Encourage maximum independence but intervene and supervise when necessary  - Involve family in performance of ADLs  - Assess for home care needs following discharge   - Consider OT consult to assist with ADL evaluation and planning for discharge  - Provide patient education as appropriate  Outcome: Progressing  Goal: Maintains/Returns to pre admission functional level  Description: INTERVENTIONS:  - Perform BMAT or MOVE assessment daily    - Set and communicate daily mobility goal to care team and patient/family/caregiver     - Collaborate with rehabilitation services on mobility goals if consulted  - Out of bed for toileting  - Record patient progress and toleration of activity level   Outcome: Progressing     Problem: PAIN - ADULT  Goal: Verbalizes/displays adequate comfort level or baseline comfort level  Description: Interventions:  - Encourage patient to monitor pain and request assistance  - Assess pain using appropriate pain scale  - Administer analgesics based on type and severity of pain and evaluate response  - Implement non-pharmacological measures as appropriate and evaluate response  - Consider cultural and social influences on pain and pain management  - Notify physician/advanced practitioner if interventions unsuccessful or patient reports new pain  Outcome: Progressing     Problem: INFECTION - ADULT  Goal: Absence or prevention of progression during hospitalization  Description: INTERVENTIONS:  - Assess and monitor for signs and symptoms of infection  - Monitor lab/diagnostic results  - Monitor all insertion sites, i e  indwelling lines, tubes, and drains  - Monitor endotracheal if appropriate and nasal secretions for changes in amount and color  - [place] appropriate cooling/warming therapies per order  - Administer medications as ordered  - Instruct and encourage patient and family to use good hand hygiene technique  - Identify and instruct in appropriate isolation precautions for identified infection/condition  Outcome: Progressing  Goal: Absence of fever/infection during neutropenic period  Description: INTERVENTIONS:  - Monitor WBC    Outcome: Progressing     Problem: SAFETY ADULT  Goal: Patient will remain free of falls  Description: INTERVENTIONS:  - Educate patient/family on patient safety including physical limitations  - Instruct patient to call for assistance with activity   - Consult OT/PT to assist with strengthening/mobility   - Keep Call bell within reach  - Keep bed low and locked with side rails adjusted as appropriate  - Keep care items and personal belongings within reach  - Initiate and maintain comfort rounds  - Make Fall Risk Sign visible to staff  - Apply yellow socks and bracelet for high fall risk patients  - Consider moving patient to room near nurses station  Outcome: Progressing  Goal: Maintain or return to baseline ADL function  Description: INTERVENTIONS:  -  Assess patient's ability to carry out ADLs; assess patient's baseline for ADL function and identify physical deficits which impact ability to perform ADLs (bathing, care of mouth/teeth, toileting, grooming, dressing, etc )  - Assess/evaluate cause of self-care deficits   - Assess range of motion  - Assess patient's mobility; develop plan if impaired  - Assess patient's need for assistive devices and provide as appropriate  - Encourage maximum independence but intervene and supervise when necessary  - Involve family in performance of ADLs  - Assess for home care needs following discharge   - Consider OT consult to assist with ADL evaluation and planning for discharge  - Provide patient education as appropriate  Outcome: Progressing  Goal: Maintains/Returns to pre admission functional level  Description: INTERVENTIONS:  - Perform BMAT or MOVE assessment daily    - Set and communicate daily mobility goal to care team and patient/family/caregiver     - Collaborate with rehabilitation services on mobility goals if consulted  - Out of bed for toileting  - Record patient progress and toleration of activity level   Outcome: Progressing     Problem: DISCHARGE PLANNING  Goal: Discharge to home or other facility with appropriate resources  Description: INTERVENTIONS:  - Identify barriers to discharge w/patient and caregiver  - Arrange for needed discharge resources and transportation as appropriate  - Identify discharge learning needs (meds, wound care, etc )  - Arrange for interpretive services to assist at discharge as needed  - Refer to Case Management Department for coordinating discharge planning if the patient needs post-hospital services based on physician/advanced practitioner order or complex needs related to functional status, cognitive ability, or social support system  Outcome: Progressing     Problem: Knowledge Deficit  Goal: Patient/family/caregiver demonstrates understanding of disease process, treatment plan, medications, and discharge instructions  Description: Complete learning assessment and assess knowledge base  Interventions:  - Provide teaching at level of understanding  - Provide teaching via preferred learning methods  Outcome: Progressing     Problem: Nutrition/Hydration-ADULT  Goal: Nutrient/Hydration intake appropriate for improving, restoring or maintaining nutritional needs  Description: Monitor and assess patient's nutrition/hydration status for malnutrition  Collaborate with interdisciplinary team and initiate plan and interventions as ordered  Monitor patient's weight and dietary intake as ordered or per policy  Utilize nutrition screening tool and intervene as necessary  Determine patient's food preferences and provide high-protein, high-caloric foods as appropriate       INTERVENTIONS:  - Monitor oral intake, urinary output, labs, and treatment plans  - Assess nutrition and hydration status and recommend course of action  - Evaluate amount of meals eaten  - Assist patient with eating if necessary   - Allow adequate time for meals  - Recommend/ encourage appropriate diets, oral nutritional supplements, and vitamin/mineral supplements  - Order, calculate, and assess calorie counts as needed  - Recommend, monitor, and adjust tube feedings and TPN/PPN based on assessed needs  - Assess need for intravenous fluids  - Provide specific nutrition/hydration education as appropriate  - Include patient/family/caregiver in decisions related to nutrition  Outcome: Progressing

## 2022-12-08 NOTE — PROGRESS NOTES
Follow up Consultation    Nephrology   Michelle Potter 74 y o  male MRN: [id number]  Unit/Bed#: -01 Encounter: [id number]      Physician Requesting Consult: Dorothea Banuelos MD        ASSESSMENT/PLAN:   53-year-old male with multiple committees including obesity, hypertension, diabetes, A  fib, adrenal insufficiency, orthostatic hypotension, CVA, psychiatric illness admitted with hypotension  Nephrology consulted for assistance with hypotension  Hypotension:  Patient status post recent hospitalization in October at which time his midodrine 10 mg p o  3 times daily which he was on previously was held due to elevated blood pressures  More recently now his blood pressures have been stable when he has received his midodrine as needed  Labile blood pressure likely secondary to autonomic dysfunction, endocrine following for adrenal insufficiency  Restarted on midodrine 2 5 mg p o  twice daily  Good response hemodynamically stable  Will not adjust dose further  Follow-up with endocrinology on hydrocortisone and Florinef  Stable for discharge from renal standpoint medically cleared patient to follow-up with his primary care physician as well as endocrinology for further blood pressure/renal insufficiency management  Patient's most recent creatinine stable at 0 93 mg/dL    H/H/anemia:  most recent hemoglobin at 10 5 g/dL  maintain hemoglobin greater than 8 grams/deciliter  Continue to monitor    Acid-base electrolytes:    Electrolytes:    Stable  Acid-base:    Most recent bicarb stable at 27    Other medical problems:  Diabetes: On insulin  CVA:  Management per primary team   Follow-up with neurology  Adrenal insufficiency:  Management per primary team   Follow-up with endocrinology  Educations adjusted per endocrinology  On Florinef 0 2 mg p o  daily, hydrocortisone 20 mg p o  twice daily      Thanks for the consult  Will continue to follow  Please call with questions/ concerns    Above-mentioned orders and Plan in terms of hypotension was discussed with the team in depth in person    1 Kehinde Abreu MD, [facility], 20[facility], 10:51 AM              Objective :   Patient seen and examined in his room no overnight events he medically stable restarted on his home previous midodrine doing stable asymptomatic had 1 reading of systolic in the 16I this a m  on repeat was stable at 130s  For possible discharge later today  PHYSICAL EXAM  /78   Pulse 78   Temp 98 4 °F (36 9 °C)   Resp 16   Wt 93 3 kg (205 lb 11 oz)   SpO2 100%   BMI 35 31 kg/m²   Temp (24hrs), Av 2 °F (36 8 °C), Min:98 °F (36 7 °C), Max:98 4 °F (36 9 °C)        Intake/Output Summary (Last 24 hours) at 2022 1051  Last data filed at 2022 1657  Gross per 24 hour   Intake --   Output 600 ml   Net -600 ml       I/O last 24 hours: In: -   Out: 600 [Urine:600]      Current Weight: Weight - Scale: 93 3 kg (205 lb 11 oz)  First Weight: Weight - Scale: 85 5 kg (188 lb 7 9 oz)  Physical Exam  Vitals and nursing note reviewed  Constitutional:       General: He is not in acute distress  Appearance: Normal appearance  He is obese  He is not ill-appearing, toxic-appearing or diaphoretic  HENT:      Head: Normocephalic and atraumatic  Mouth/Throat:      Mouth: Mucous membranes are moist       Pharynx: Oropharynx is clear  No oropharyngeal exudate  Eyes:      General: No scleral icterus  Conjunctiva/sclera: Conjunctivae normal    Cardiovascular:      Rate and Rhythm: Normal rate  Heart sounds: Normal heart sounds  No friction rub  Pulmonary:      Effort: Pulmonary effort is normal  No respiratory distress  Breath sounds: Normal breath sounds  No stridor  Abdominal:      General: There is no distension  Palpations: Abdomen is soft  There is no mass  Tenderness: There is no abdominal tenderness  Musculoskeletal:         General: No swelling  Cervical back: Normal range of motion and neck supple  No rigidity  Skin:     General: Skin is warm  Coloration: Skin is not jaundiced  Neurological:      General: No focal deficit present  Mental Status: He is alert and oriented to person, place, and time  Psychiatric:         Mood and Affect: Mood normal          Behavior: Behavior normal              Review of Systems   Constitutional: Negative for chills and fatigue  HENT: Negative for congestion  Respiratory: Negative for cough, shortness of breath and wheezing  Cardiovascular: Negative for leg swelling  Gastrointestinal: Negative for abdominal pain, constipation and diarrhea  Genitourinary: Negative for dysuria  Musculoskeletal: Negative for back pain  Neurological: Negative for dizziness and headaches  Psychiatric/Behavioral: Negative for agitation  All other systems reviewed and are negative        Scheduled Meds:  Current Facility-Administered Medications   Medication Dose Route Frequency Provider Last Rate   • acetaminophen  650 mg Oral Q6H PRN Salena Gardiner MD     • apixaban  5 mg Oral BID Salena Gardiner MD     • aspirin  81 mg Oral Daily Salena Gardiner MD     • atorvastatin  10 mg Oral Daily Salena Gardiner MD     • bisacodyl  10 mg Rectal Daily PRN Vicki Worley MD     • calcitriol  0 25 mcg Oral Daily Salena Gardiner MD     • calcium carbonate  1,000 mg Oral Daily PRN Salena Gardiner MD     • cyanocobalamin  100 mcg Oral Daily Salena Gardiner MD     • docusate sodium  100 mg Oral BID Salena Gardiner MD     • fludrocortisone  0 2 mg Oral Daily Salena Gardiner MD     • hydrALAZINE  5 mg Intravenous Q6H PRN Vicki Worley MD     • hydrocortisone  20 mg Oral 2 times per day Karley Diamond PA-C     • insulin glargine  14 Units Subcutaneous HS Cedric Velázquez MD     • insulin lispro  1-5 Units Subcutaneous TID AC Salena Gardiner MD     • insulin lispro  1-5 Units Subcutaneous HS Salena Gardiner MD     • insulin lispro  6 Units Subcutaneous TID With Meals Melva Been MD Caden     • metoprolol tartrate  12 5 mg Oral BID Judge Milka MD     • midodrine  2 5 mg Oral BID AC Jean Javier MD     • midodrine  5 mg Oral TID PRN Clemetine Closs, MD     • ondansetron  4 mg Intravenous Q6H PRN Judge Milka MD     • polyethylene glycol  17 g Oral Daily Clemetine Closs, MD     • potassium chloride  20 mEq Oral BID Judge Milka MD     • risperiDONE  1 mg Oral BID Judge Milka MD     • senna  2 tablet Oral Daily Judge Milka MD     • sertraline  100 mg Oral Daily Judge Milka MD     • sodium chloride (PF)  3 mL Intravenous Q1H PRN Jack Garrett DO         PRN Meds: •  acetaminophen  •  bisacodyl  •  calcium carbonate  •  hydrALAZINE  •  midodrine  •  ondansetron  •  Insert peripheral IV **AND** sodium chloride (PF)    Continuous Infusions:       Invasive Devices: Invasive Devices     Peripheral Intravenous Line  Duration           Peripheral IV 12/07/22 Left;Ventral (anterior) Wrist <1 day                  LABORATORY:    Results from last 7 days   Lab Units 12/08/22  0531 12/07/22  1113 12/06/22  0511 12/04/22  0529 12/03/22  1023 12/02/22  0623   WBC Thousand/uL  --   --  9 08 8 61  --   --    HEMOGLOBIN g/dL  --   --  10 5* 11 0*  --   --    HEMATOCRIT %  --   --  34 5* 35 5*  --   --    PLATELETS Thousands/uL  --   --  205 217  --   --    POTASSIUM mmol/L 4 1 4 3 4 3 4 0 4 8 4 9   CHLORIDE mmol/L 109* 109* 110* 107 107 108   CO2 mmol/L 27 25 21 27 28 27   BUN mg/dL 14 15 16 16 19 23   CREATININE mg/dL 0 93 1 05 1 10 0 96 1 03 1 00   CALCIUM mg/dL 8 9 8 7 8 5 8 9 8 8 8 9      rest all reviewed    RADIOLOGY:  CT head wo contrast   Final Result by Jarvis Meigs, MD (12/06 1342)      No acute intracranial abnormality  Chronic microangiopathic changes  1      Similar appearing chronic right maxillary sinusitis                    Workstation performed: [id number]         CT chest abdomen pelvis w contrast   Final Result by Kamala Jordin Alonso MD (11/30 1657)      No convincing evidence of a primary or metastatic malignancy in the chest, abdomen or pelvis  Diffusely atrophic pancreas with several calcifications suggestive of sequela of chronic pancreatitis  1 0 x 0 5 cm calculus in pancreatic neck, with main pancreatic duct dilated up to 6 mm just distal to this calculus, indicating it may be within the    main pancreatic duct  The main pancreatic duct tapers to smaller caliber more distally  No definite pancreatic lesions  Somewhat distended urinary bladder, extending above the level of the umbilicus  Recommend clinical correlation for urinary retention  Stool throughout the colon, suggestive of constipation  No bowel obstruction or convincing bowel inflammation  Small right pleural effusion  Minimal atelectasis at lung bases with no other airspace opacities  No suspicious pulmonary nodules  Additional findings as above  The study was marked in [facility] for immediate notification  Workstation performed: [id number]         MRI brain wo contrast   Final Result by Neris Miller MD (11/28 6590)         1  No MR evidence of acute ischemia  2  Volume loss/atrophy appears advanced for the patient's age  3  Old left lacunar infarct  Workstation performed: [id number]         X-ray chest 1 view portable   Final Result by Dustin Varghese MD (11/28 2276)      No acute cardiopulmonary disease  Workstation performed: [id number]         CT head without contrast   Final Result by Lennox Church MD (11/28 6817)      No acute intracranial abnormality      Subacute or chronic lacunar infarct in right basal ganglia, new since 10/19/2022  Unchanged chronic lacunar infarct in the basal ganglia  The study was marked in [facility] for immediate notification                 Workstation performed: [id number]           Rest all reviewed    Portions of the record may have been created with voice recognition software  Occasional wrong word or "sound a like" substitutions may have occurred due to the inherent limitations of voice recognition software  Read the chart carefully and recognize, using context, where substitutions have occurred  If you have any questions, please contact the dictating provider

## 2022-12-08 NOTE — DISCHARGE INSTRUCTIONS
Please follow up with your primary care provider within one week   Please follow up with GI and endocrinology   Monitor blood pressures closely at home, continue midodrine 2 5 mg twice a day for now

## 2022-12-08 NOTE — ASSESSMENT & PLAN NOTE
Lab Results   Component Value Date    HGBA1C 7 5 (H) 07/22/2022       Recent Labs     12/07/22  1605 12/07/22  2106 12/08/22  0554 12/08/22  1040   POCGLU 64* 167* 111 239*       Blood Sugar Average: Last 72 hrs:  · (P) 318 [id number]   · Endo following,  · Due to AM hypoglycemia, regimen recently decreased to Lantus 22 units QHS with humalog 6 units TID with meals   · Continue SSI coverage  · QID glucose checks  · Consistent carb diet   · Monitor and adjust regimen as needed

## 2022-12-08 NOTE — ASSESSMENT & PLAN NOTE
Patient noted to be hypertensive on 11/30 after receiving around the clock midodrine (at home patient is on p r n   Midodrine for SBP is less than 110)  · However when patient was maintained on PRN midodrine here, noted to be persistently hypotensive   · Increased Cortef to 20 mg BID x 2 days, transition back to 10 mg BID (home dose) tomorrow  · Nephrology started scheduled midodrine 2 5 mg BID with improvement, continue on discharge  · Currently lopressor 12 5 mg BID with holding parameters  · Continue abdominal binder, compression stockings  · IVF have been d/c  · Monitor pressures closely as an outpatient, BP improved over last 24 hours

## 2022-12-08 NOTE — DISCHARGE SUMMARY
[facility] 1955, 74 y o  male MRN: [id number]  Unit/Bed#: -01 Encounter: [id number]  Primary Care Provider: Felicia Manuel MD   Date and time admitted to hospital: 11/28/2022  3:27 PM      DOS: 12/8/2022   * Adrenal insufficiency   Assessment & Plan  Unclear etiology of the inciting factor  He currently denies any symptoms  His daughter reports that he also has a mild productive cough  · Infectious w/u was negative  · Chronic appearing wounds are present but do not appear acutely infected  · Was on IV hydrocortisone but as of 12/5 was placed back on home regimen of Cortef 10 mg BID per endocrinology  · Also on home dose florinef 0 2 mg daily  · Noted to have orthostatic hypotension on 12/6, therefore he was given IVF bolus, abdominal binder and compression stockings  Hydrocortisone also increased to 20 mg BID x 2 days, transition back to home dose tomorrow  Midodrine added scheduled at 2 5 mg BID to continue at home as well  Blood pressures overall are much improved  Monitor blood pressures closely at home  Essential hypertension  Assessment & Plan  Patient noted to be hypertensive on 11/30 after receiving around the clock midodrine (at home patient is on p r n   Midodrine for SBP is less than 110)  · However when patient was maintained on PRN midodrine here, noted to be persistently hypotensive   · Increased Cortef to 20 mg BID x 2 days, transition back to 10 mg BID (home dose) tomorrow  · Nephrology started scheduled midodrine 2 5 mg BID with improvement, continue on discharge  · Currently lopressor 12 5 mg BID with holding parameters  · Continue abdominal binder, compression stockings  · IVF have been d/c  · Monitor pressures closely as an outpatient, BP improved over last 24 hours       Acute encephalopathy  Assessment & Plan  · Daughter noting pt with increased confusion, forgetfulness while hospitalized   · CT head obtained 12/6 negative for acute findings  · Could be multifactorial in setting of hospital environment, recent IV steroids, hypotension   · Continue supportive care and plan as above  · Monitor mentation closely, alert and oriented x 3 today, will likely improve once patient in home environment     Unspecified protein-calorie malnutrition ([facility] [address] )  Assessment & Plan  Malnutrition Findings:   Adult Malnutrition type: Acute illness  Adult Degree of Malnutrition: Unspecified protein calorie malnutrition  Malnutrition Characteristics: Muscle loss, Weight loss                  360 Statement: in setting of unknown etiology - 9% wt loss x 2 weeks, (11/14/22 208 lbs - 11/30/22 188 5 lbs),  muscle wasting/slight depression/hollowing in temple region, treated with diet, PT refusing supplements    BMI Findings: Body mass index is 35 31 kg/m²  Unintentional weight loss  Assessment & Plan  Patient has home visiting nurse, noted to weigh about 200 lb on 11/05, down to 180 lb just prior to admission  · About 20 lb unintentional weight loss over 3 weeks  · Per patient and collateral from daughter, p o  intake has not changed and patient was taking decent p o  at home  · Etiology unclear  · Nutrition consulted  · CT torso done 11/30 w/o e/o malignancy however abnormalities in pancreas somewhat consistent with his known prior episode of pancreatitis  · Per GI-- will need outpatient EGD and colonosocpy to further evaluate for unintentional weight loss  · fecal elastase <50, discussed with GI, recommending follow up outpatient   · No further imaging needed for pancreatic abnormalities at this juncture      Slurred speech  Assessment & Plan  Head CT done on presentation, initial read c/f subacute vs chronic lacunar cva identified since last month - per neurology review of this plus MRI brain done, findings c/w chronic infarcts, no subacute or acute findings  · Continue aspirin and eliquis  · MRI brain c/w chronic infarcts only  · Neurology evaluated, given chronic infarcts without any concern for subacute or acute findings, continue current management  · Daughter reported she felt speech was slightly more slurred on phone on 12/6 requiring STAT CT head to be obtained which was negative  Possibly in setting of hypotension vs  AM hypoglycemia  Speech is fluent, otherwise doing well now neurologically       Orthostatic hypotension  Assessment & Plan  Possibly due to diabetes w diabetic autonomic neuropathy as earlier this year he had been in the hospital with orthostatic hypotension   Despite high-dose glucocorticoids he was still orthostatic indicating adrenal insufficiency possibly not the cause   · Continue florinef at home dose, at home takes midodrine PRN--switched to midodrine 2 5 mg BID per nephrology with improvement of pressures, continue on discharge    · When was on scheduled midodrine reportedly developed severe supine HTN but this was reportedly on higher dose   · Continue abdominal binder and compression stockings  · Follow up with PCP and endocrinology     Psychiatric disorder  Assessment & Plan  · Mood appears stable at this time  · Continue risperidone 1 mg BID and sertraline 100 mg daily     Anemia of chronic disease  Assessment & Plan  Hb is above his baseline, noted to be 10 5 most recently   · Continue B12 supplementation  · Follow up outpatient     Paroxysmal atrial fibrillation   Assessment & Plan  · Pt maintained on Lopressor 12 5 mg BID for rate control, continue for now with holding parameters for hypotension   · Continue Eliquis for [facility]    Insulin dependent type 1 diabetes mellitus   Assessment & Plan  Lab Results   Component Value Date    HGBA1C 7 5 (H) 07/22/2022       Recent Labs     12/07/22  1605 12/07/22  2106 12/08/22  0554 12/08/22  1040   POCGLU 64* 167* 111 239*       Blood Sugar Average: Last 72 hrs:  · (P) 153 [id number]   · Endo following,  · Due to AM hypoglycemia, regimen recently decreased to Lantus 22 units QHS with humalog 6 units TID with meals   · Continue SSI coverage  · QID glucose checks  · Consistent carb diet   · Monitor and adjust regimen as needed      Medical Problems     Resolved Problems  Date Reviewed: 12/8/2022   None       Discharging Physician / Practitioner: Julianna Fabian PA-C  PCP: Miquel Denver, MD  Admission Date:   Admission Orders (From admission, onward)     Ordered        11/28/22 1737  INPATIENT ADMISSION  Once                      Discharge Date: 12/08/22    Consultations During Hospital Stay:  · Nephrology  · Endocrinology  · Neurology   · GI    Procedures Performed:   · CT head 11/28 - No acute intracranial abnormality  Subacute or chronic lacunar infarct in right basal ganglia, new since 10/19  Unchanged chronic lacunar infarct in basal ganglia  · CXR 11/28 - no acute cardiopulmonary disease  · MRI brain 11/28 - No MR evidence of acute ischemia  Volume loss/atrophy appears advanced for the patient's age  Old left lacunar infarct  · CT c/a/p 11/30 - No convincing evidence of primary or metastatic malignancy in the c/a/p  Diffusely atrophic pnacreas with several calcifications suggestive of sequela of chronic pancreatitis  1 0x0 5 cm calculus in pancreatic neck, with main pancreatic duct dilated up to 6 mm just distal to calculus  No definite pancreatic lesions  Somewhat distended urinary bladder, extending above the level of the umbilicus  Stool throughout the colon, suggestive of constipation  No bowel obstruction or convincing bowel inflammation  Small right pleural effusion, minimal atelectasis at lung bases with no other airspace opacities  No suspicious pulmonary nodules  · CT head 12/6 - No acute intracranial abnormality  Chronic microangiopathic changes       Significant Findings / Test Results:   · Troponins negative   · Procal negative   · COVID/flu/RSV negative   · Blood cultures negative x 5 days   · Pancreatic elastase, fecal <50, discussed with GI, continues to recommend outpatient GI follow up     Incidental Findings:   · Pancreatic calculus, ductal dilation    · I reviewed the above mentioned incidental findings with the patient and/or family and they expressed understanding  Test Results Pending at Discharge (will require follow up): · None     Outpatient Tests Requested:  · Per PCP    Complications:  None    Reason for Admission: Hypotension     Hospital Course:   Soctt Norris is a 74 y o  male patient with significant past medical history of type 1 DM, paroxysmal a  Fib on chronic eliquis ac, anemia, orthostatic hypotension who originally presented to the hospital on 11/28/2022 due to hypotension  There was concern for possible subacute infarct on initial CT scan and therefore neurology was consulted  Therefore MRI brain was obtained that confirmed chronic infarct, no acute findings were noted and patient was maintained on his prior to admission medications of statin and a/c  There was concern for adrenal insufficiency and therefore patient was placed on increased IV hydrocortisone dosing with improvement  He was seen by GI as well due to unintentional weight loss as an outpatient and abnormal imaging of the pancreas  Pt to be seen outpatient for EGD/colonoscopy and further evaluation for pancreatic insufficiency  Pt's blood pressures remained low after d/c of IV steroids  Therefore he was increased on his PO hydrocortisone for 2 days prior to discharge and advised to resume home dose of 10 mg BID on discharge  He was placed on scheduled midodrine 2 5 mg BID with improvement and advised to remain on this on discharge per nephrology recommendations  Pt was stable for discharge from nephrology and endocrine standpoints  He was discharged in stable condition  For additional information please refer to medical records  Medication changes include:  Addition of midodrine 2 5 mg BID, resume home hydrocortisone and insulin regimen      Please see above list of diagnoses and related plan for additional information  Condition at Discharge: stable    Discharge Day Visit / Exam:   Subjective:  Pt reports that he feels well today  He denies any lightheadedness, dizziness, shortness of breath, nausea or vomiting  Reports that he wants to go home  Vitals: Blood Pressure: 137/78 (12/08/22 1012)  Pulse: 78 (12/08/22 1012)  Temperature: 98 4 °F (36 9 °C) (12/08/22 0759)  Temp Source: Oral (12/06/22 0821)  Respirations: 16 (12/08/22 0759)  Weight - Scale: 93 3 kg (205 lb 11 oz) (12/08/22 0537)  SpO2: 100 % (12/08/22 1012)  Exam:   Physical Exam  Vitals reviewed  Constitutional:       General: He is not in acute distress  Appearance: He is not toxic-appearing  Comments: Pt is in no acute distress sitting in his hospital bed resting comfortably  Alert and oriented x 3, speech is fluent  Answers questions appropriately  HENT:      Head: Normocephalic and atraumatic  Cardiovascular:      Rate and Rhythm: Normal rate and regular rhythm  Pulses: Normal pulses  Pulmonary:      Effort: Pulmonary effort is normal  No respiratory distress  Breath sounds: No wheezing  Abdominal:      General: Bowel sounds are normal  There is no distension  Palpations: Abdomen is soft  Tenderness: There is no abdominal tenderness  Musculoskeletal:      Right lower leg: No edema  Left lower leg: No edema  Comments: Chronic lower extremity wounds with dressing c/d/i   Skin:     General: Skin is warm and dry  Findings: No erythema  Neurological:      Mental Status: He is alert  Discussion with Family: Updated  (daughter) via phone  Discharge instructions/Information to patient and family:   See after visit summary for information provided to patient and family  Provisions for Follow-Up Care:  See after visit summary for information related to follow-up care and any pertinent home health orders         Disposition: Home    Planned Readmission: None     Discharge Statement:  I spent 45 minutes discharging the patient  This time was spent on the day of discharge  I had direct contact with the patient on the day of discharge  Greater than 50% of the total time was spent examining patient, answering all patient questions, arranging and discussing plan of care with patient as well as directly providing post-discharge instructions  Additional time then spent on discharge activities  Discharge Medications:  See after visit summary for reconciled discharge medications provided to patient and/or family        **Please Note: This note may have been constructed using a voice recognition system**

## 2022-12-08 NOTE — ASSESSMENT & PLAN NOTE
· Pt maintained on Lopressor 12 5 mg BID for rate control, continue for now with holding parameters for hypotension   · Continue Eliquis for [facility]

## 2022-12-08 NOTE — ASSESSMENT & PLAN NOTE
· Daughter noting pt with increased confusion, forgetfulness while hospitalized   · CT head obtained 12/6 negative for acute findings  · Could be multifactorial in setting of hospital environment, recent IV steroids, hypotension   · Continue supportive care and plan as above  · Monitor mentation closely, alert and oriented x 3 today, will likely improve once patient in home environment

## 2022-12-08 NOTE — PLAN OF CARE
Problem: Potential for Falls  Goal: Patient will remain free of falls  Description: INTERVENTIONS:  - Educate patient/family on patient safety including physical limitations  - Instruct patient to call for assistance with activity   - Consult OT/PT to assist with strengthening/mobility   - Keep Call bell within reach  - Keep bed low and locked with side rails adjusted as appropriate  - Keep care items and personal belongings within reach  - Initiate and maintain comfort rounds  - Make Fall Risk Sign visible to staff  - Offer Toileting every    Hours, in advance of need  - Initiate/Maintain   alarm  - Obtain necessary fall risk management equipment:     - Apply yellow socks and bracelet for high fall risk patients  - Consider moving patient to room near nurses station  Outcome: Progressing     Problem: MOBILITY - ADULT  Goal: Maintain or return to baseline ADL function  Description: INTERVENTIONS:  -  Assess patient's ability to carry out ADLs; assess patient's baseline for ADL function and identify physical deficits which impact ability to perform ADLs (bathing, care of mouth/teeth, toileting, grooming, dressing, etc )  - Assess/evaluate cause of self-care deficits   - Assess range of motion  - Assess patient's mobility; develop plan if impaired  - Assess patient's need for assistive devices and provide as appropriate  - Encourage maximum independence but intervene and supervise when necessary  - Involve family in performance of ADLs  - Assess for home care needs following discharge   - Consider OT consult to assist with ADL evaluation and planning for discharge  - Provide patient education as appropriate  Outcome: Progressing  Goal: Maintains/Returns to pre admission functional level  Description: INTERVENTIONS:  - Perform BMAT or MOVE assessment daily    - Set and communicate daily mobility goal to care team and patient/family/caregiver     - Collaborate with rehabilitation services on mobility goals if consulted  - Perform Range of Motion    times a day  - Reposition patient every      hours    - Dangle patient    times a day  - Stand patient    times a day  - Ambulate patient      times a day  - Out of bed to chair    times a day   - Out of bed for meals  times a day  - Out of bed for toileting  - Record patient progress and toleration of activity level   Outcome: Progressing     Problem: PAIN - ADULT  Goal: Verbalizes/displays adequate comfort level or baseline comfort level  Description: Interventions:  - Encourage patient to monitor pain and request assistance  - Assess pain using appropriate pain scale  - Administer analgesics based on type and severity of pain and evaluate response  - Implement non-pharmacological measures as appropriate and evaluate response  - Consider cultural and social influences on pain and pain management  - Notify physician/advanced practitioner if interventions unsuccessful or patient reports new pain  Outcome: Progressing     Problem: INFECTION - ADULT  Goal: Absence or prevention of progression during hospitalization  Description: INTERVENTIONS:  - Assess and monitor for signs and symptoms of infection  - Monitor lab/diagnostic results  - Monitor all insertion sites, i e  indwelling lines, tubes, and drains  - Monitor endotracheal if appropriate and nasal secretions for changes in amount and color  - Turner appropriate cooling/warming therapies per order  - Administer medications as ordered  - Instruct and encourage patient and family to use good hand hygiene technique  - Identify and instruct in appropriate isolation precautions for identified infection/condition  Outcome: Progressing  Goal: Absence of fever/infection during neutropenic period  Description: INTERVENTIONS:  - Monitor WBC    Outcome: Progressing     Problem: SAFETY ADULT  Goal: Patient will remain free of falls  Description: INTERVENTIONS:  - Educate patient/family on patient safety including physical limitations  - Instruct patient to call for assistance with activity   - Consult OT/PT to assist with strengthening/mobility   - Keep Call bell within reach  - Keep bed low and locked with side rails adjusted as appropriate  - Keep care items and personal belongings within reach  - Initiate and maintain comfort rounds  - Make Fall Risk Sign visible to staff  - Offer Toileting every    Hours, in advance of need  - Initiate/Maintain alarm  - Obtain necessary fall risk management equipment:   - Apply yellow socks and bracelet for high fall risk patients  - Consider moving patient to room near nurses station  Outcome: Progressing  Goal: Maintain or return to baseline ADL function  Description: INTERVENTIONS:  -  Assess patient's ability to carry out ADLs; assess patient's baseline for ADL function and identify physical deficits which impact ability to perform ADLs (bathing, care of mouth/teeth, toileting, grooming, dressing, etc )  - Assess/evaluate cause of self-care deficits   - Assess range of motion  - Assess patient's mobility; develop plan if impaired  - Assess patient's need for assistive devices and provide as appropriate  - Encourage maximum independence but intervene and supervise when necessary  - Involve family in performance of ADLs  - Assess for home care needs following discharge   - Consider OT consult to assist with ADL evaluation and planning for discharge  - Provide patient education as appropriate  Outcome: Progressing  Goal: Maintains/Returns to pre admission functional level  Description: INTERVENTIONS:  - Perform BMAT or MOVE assessment daily    - Set and communicate daily mobility goal to care team and patient/family/caregiver  - Collaborate with rehabilitation services on mobility goals if consulted  - Perform Range of Motion  times a day  - Reposition patient every  hours    - Dangle patient  times a day  - Stand patient   times a day  - Ambulate patient    times a day  - Out of bed to chair times a day   - Out of bed for meals      times a day  - Out of bed for toileting  - Record patient progress and toleration of activity level   Outcome: Progressing     Problem: DISCHARGE PLANNING  Goal: Discharge to home or other facility with appropriate resources  Description: INTERVENTIONS:  - Identify barriers to discharge w/patient and caregiver  - Arrange for needed discharge resources and transportation as appropriate  - Identify discharge learning needs (meds, wound care, etc )  - Arrange for interpretive services to assist at discharge as needed  - Refer to Case Management Department for coordinating discharge planning if the patient needs post-hospital services based on physician/advanced practitioner order or complex needs related to functional status, cognitive ability, or social support system  Outcome: Progressing     Problem: Knowledge Deficit  Goal: Patient/family/caregiver demonstrates understanding of disease process, treatment plan, medications, and discharge instructions  Description: Complete learning assessment and assess knowledge base  Interventions:  - Provide teaching at level of understanding  - Provide teaching via preferred learning methods  Outcome: Progressing     Problem: Nutrition/Hydration-ADULT  Goal: Nutrient/Hydration intake appropriate for improving, restoring or maintaining nutritional needs  Description: Monitor and assess patient's nutrition/hydration status for malnutrition  Collaborate with interdisciplinary team and initiate plan and interventions as ordered  Monitor patient's weight and dietary intake as ordered or per policy  Utilize nutrition screening tool and intervene as necessary  Determine patient's food preferences and provide high-protein, high-caloric foods as appropriate       INTERVENTIONS:  - Monitor oral intake, urinary output, labs, and treatment plans  - Assess nutrition and hydration status and recommend course of action  - Evaluate amount of meals eaten  - Assist patient with eating if necessary   - Allow adequate time for meals  - Recommend/ encourage appropriate diets, oral nutritional supplements, and vitamin/mineral supplements  - Order, calculate, and assess calorie counts as needed  - Recommend, monitor, and adjust tube feedings and TPN/PPN based on assessed needs  - Assess need for intravenous fluids  - Provide specific nutrition/hydration education as appropriate  - Include patient/family/caregiver in decisions related to nutrition  Outcome: Progressing

## 2022-12-08 NOTE — INCIDENTAL FINDINGS
The following findings require follow up:  Radiographic finding   Finding: Diffuse atrophic pancreas with calcifications, calculus in pancreatic neck, main pancreatic duct dilated up to 6 mm distal to calculus   Follow up required: GI follow up   Follow up should be done within 1 month(s)    Please notify the following clinician to assist with the follow up:   Dr Caron Alvarado MD

## 2022-12-08 NOTE — ASSESSMENT & PLAN NOTE
Malnutrition Findings:   Adult Malnutrition type: Acute illness  Adult Degree of Malnutrition: Unspecified protein calorie malnutrition  Malnutrition Characteristics: Muscle loss, Weight loss                  360 Statement: in setting of unknown etiology - 9% wt loss x 2 weeks, (11/14/22 208 lbs - 11/30/22 188 5 lbs),  muscle wasting/slight depression/hollowing in temple region, treated with diet, PT refusing supplements    BMI Findings: Body mass index is 35 31 kg/m²

## 2022-12-08 NOTE — ASSESSMENT & PLAN NOTE
Head CT done on presentation, initial read c/f subacute vs chronic lacunar cva identified since last month - per neurology review of this plus MRI brain done, findings c/w chronic infarcts, no subacute or acute findings  · Continue aspirin and eliquis  · MRI brain c/w chronic infarcts only  · Neurology evaluated, given chronic infarcts without any concern for subacute or acute findings, continue current management  · Daughter reported she felt speech was slightly more slurred on phone on 12/6 requiring STAT CT head to be obtained which was negative  Possibly in setting of hypotension vs  AM hypoglycemia   Speech is fluent, otherwise doing well now neurologically

## 2022-12-09 ENCOUNTER — TELEPHONE (OUTPATIENT)
Dept: GASTROENTEROLOGY | Facility: CLINIC | Age: 67
End: 2022-12-09

## 2022-12-09 LAB
ALBUMIN SERPL ELPH-MCNC: 3.69 G/DL (ref 3.5–5)
ALBUMIN SERPL ELPH-MCNC: 56.7 % (ref 52–65)
ALBUMIN UR ELPH-MCNC: 40.3 %
ALPHA1 GLOB MFR UR ELPH: 9.1 %
ALPHA1 GLOB SERPL ELPH-MCNC: 0.27 G/DL (ref 0.1–0.4)
ALPHA1 GLOB SERPL ELPH-MCNC: 4.1 % (ref 2.5–5)
ALPHA2 GLOB MFR UR ELPH: 15.2 %
ALPHA2 GLOB SERPL ELPH-MCNC: 0.68 G/DL (ref 0.4–1.2)
ALPHA2 GLOB SERPL ELPH-MCNC: 10.5 % (ref 7–13)
B-GLOBULIN MFR UR ELPH: 15.1 %
BETA GLOB ABNORMAL SERPL ELPH-MCNC: 0.49 G/DL (ref 0.4–0.8)
BETA1 GLOB SERPL ELPH-MCNC: 7.6 % (ref 5–13)
BETA2 GLOB SERPL ELPH-MCNC: 5.1 % (ref 2–8)
BETA2+GAMMA GLOB SERPL ELPH-MCNC: 0.33 G/DL (ref 0.2–0.5)
GAMMA GLOB ABNORMAL SERPL ELPH-MCNC: 1.04 G/DL (ref 0.5–1.6)
GAMMA GLOB MFR UR ELPH: 20.3 %
GAMMA GLOB SERPL ELPH-MCNC: 16 % (ref 12–22)
IGG/ALB SER: 1.31 {RATIO} (ref 1.1–1.8)
KAPPA LC FREE SER-MCNC: 48.1 MG/L (ref 3.3–19.4)
KAPPA LC FREE/LAMBDA FREE SER: 2.03 {RATIO} (ref 0.26–1.65)
LAMBDA LC FREE SERPL-MCNC: 23.7 MG/L (ref 5.7–26.3)
PROT PATTERN SERPL ELPH-IMP: NORMAL
PROT PATTERN UR ELPH-IMP: NORMAL
PROT SERPL-MCNC: 6.5 G/DL (ref 6.4–8.2)
PROT UR-MCNC: 6 MG/DL

## 2022-12-09 NOTE — TELEPHONE ENCOUNTER
----- Message from Tali Torres sent at 12/5/2022 11:18 AM EST -----    ----- Message -----  From: Jefferson Anderson MD  Sent: 12/2/2022   5:06 PM EST  To: , #    Hi,    Can you please help the patient set up a follow up appointment with any provider in 4-6 weeks  Diagnosis: chronic panc    Thank you      Heber

## 2022-12-09 NOTE — TELEPHONE ENCOUNTER
TC to pt's daughter to schedule follow up post hospitalization  Scheduled 1/17/23 at [facility] with Dr Jesse Tariq

## 2022-12-12 NOTE — RESULT ENCOUNTER NOTE
Hello    Patient normally is followed up by Ms Edin Peña  Renal MA team -can you please let the patient know that she had slightly elevated kappa/lambda ratio  It may be a normal variant for the patient  Please let the patient know that he should see the hematologist to evaluate if there is any issues with his immunoglobulin/paraprotein  I can review with the patient in more detail what this testing means if he needs-please let me know if that is the case    Dr Thiago Morales -you are seeing the patient in January for hospital follow-up  We were on board for hypotension  Has adrenal insufficiency managed by endocrine  We restarted his midodrine and blood pressures are improved    SPEP, UPEP, free light chains were sent due to unclear etiology of autonomic dysfunction beyond diabetes and also anemia    Thank you    np

## 2022-12-14 ENCOUNTER — TELEPHONE (OUTPATIENT)
Dept: NEPHROLOGY | Facility: CLINIC | Age: 67
End: 2022-12-14

## 2022-12-14 NOTE — TELEPHONE ENCOUNTER
----- Message from Amisha Beltran MD sent at 12/12/2022  9:50 AM EST -----  Hello    Patient normally is followed up by Ms Whiteora Devonte  Renal MA team -can you please let the patient know that she had slightly elevated kappa/lambda ratio  It may be a normal variant for the patient  Please let the patient know that he should see the hematologist to evaluate if there is any issues with his immunoglobulin/paraprotein  I can review with the patient in more detail what this testing means if he needs-please let me know if that is the case    Dr Odette Valdez -you are seeing the patient in January for hospital follow-up  We were on board for hypotension  Has adrenal insufficiency managed by endocrine  We restarted his midodrine and blood pressures are improved    SPEP, UPEP, free light chains were sent due to unclear etiology of autonomic dysfunction beyond diabetes and also anemia    Thank you    np

## 2022-12-19 ENCOUNTER — TELEPHONE (OUTPATIENT)
Dept: NEUROLOGY | Facility: CLINIC | Age: 67
End: 2022-12-19

## 2022-12-19 NOTE — TELEPHONE ENCOUNTER
Patient seen at [facility], Neurology consulted  Per notes: Suresh Ely will need follow up in at the next regular appointment with neurovascular provider at [facility]  He will not require outpatient neurological testing       Not scheduling - removing from list

## 2022-12-23 ENCOUNTER — TELEPHONE (OUTPATIENT)
Dept: CARDIOLOGY CLINIC | Facility: CLINIC | Age: 67
End: 2022-12-23

## 2022-12-23 NOTE — TELEPHONE ENCOUNTER
Spoke with daughter re: 3 month f/u for Feb   Stated pt was in hosp recently and was told he needed to be seen soon   Sent msg to  about getting pt in earlier and will call daughter back

## 2022-12-27 ENCOUNTER — OFFICE VISIT (OUTPATIENT)
Dept: ENDOCRINOLOGY | Facility: CLINIC | Age: 67
End: 2022-12-27

## 2022-12-27 VITALS
HEART RATE: 81 BPM | BODY MASS INDEX: 32.64 KG/M2 | WEIGHT: 191.2 LBS | OXYGEN SATURATION: 99 % | SYSTOLIC BLOOD PRESSURE: 104 MMHG | DIASTOLIC BLOOD PRESSURE: 64 MMHG | TEMPERATURE: 98.5 F | HEIGHT: 64 IN

## 2022-12-27 DIAGNOSIS — E10.65 TYPE 1 DIABETES MELLITUS WITH HYPERGLYCEMIA (HCC): Primary | ICD-10-CM

## 2022-12-27 DIAGNOSIS — E66.9 CLASS 1 OBESITY WITHOUT SERIOUS COMORBIDITY WITH BODY MASS INDEX (BMI) OF 32.0 TO 32.9 IN ADULT, UNSPECIFIED OBESITY TYPE: ICD-10-CM

## 2022-12-27 DIAGNOSIS — E27.40 ADRENAL INSUFFICIENCY (HCC): ICD-10-CM

## 2022-12-27 DIAGNOSIS — E10.9 INSULIN DEPENDENT TYPE 1 DIABETES MELLITUS (HCC): ICD-10-CM

## 2022-12-27 RX ORDER — MIDODRINE HYDROCHLORIDE 10 MG/1
TABLET ORAL
Status: ON HOLD | COMMUNITY
Start: 2022-12-22

## 2022-12-27 RX ORDER — PEN NEEDLE, DIABETIC 32GX 5/32"
NEEDLE, DISPOSABLE MISCELLANEOUS
Qty: 100 EACH | Refills: 2 | Status: ON HOLD | OUTPATIENT
Start: 2022-12-27

## 2022-12-27 RX ORDER — INSULIN GLARGINE 300 U/ML
18 INJECTION, SOLUTION SUBCUTANEOUS
Qty: 15 ML | Refills: 0 | Status: ON HOLD | OUTPATIENT
Start: 2022-12-27

## 2022-12-27 RX ORDER — HYDROCORTISONE 5 MG/1
TABLET ORAL
Qty: 180 TABLET | Refills: 1 | Status: ON HOLD | OUTPATIENT
Start: 2022-12-27

## 2022-12-29 ENCOUNTER — TELEPHONE (OUTPATIENT)
Dept: GASTROENTEROLOGY | Facility: CLINIC | Age: 67
End: 2022-12-29

## 2022-12-29 ENCOUNTER — OFFICE VISIT (OUTPATIENT)
Dept: GASTROENTEROLOGY | Facility: CLINIC | Age: 67
End: 2022-12-29

## 2022-12-29 VITALS
DIASTOLIC BLOOD PRESSURE: 72 MMHG | WEIGHT: 185 LBS | HEIGHT: 64 IN | TEMPERATURE: 97.2 F | SYSTOLIC BLOOD PRESSURE: 110 MMHG | BODY MASS INDEX: 31.58 KG/M2

## 2022-12-29 DIAGNOSIS — R63.4 UNINTENTIONAL WEIGHT LOSS: ICD-10-CM

## 2022-12-29 DIAGNOSIS — Z12.11 SCREENING FOR COLON CANCER: Primary | ICD-10-CM

## 2022-12-29 DIAGNOSIS — E46 PROTEIN-CALORIE MALNUTRITION, UNSPECIFIED SEVERITY (HCC): ICD-10-CM

## 2022-12-29 NOTE — PATIENT INSTRUCTIONS
Scheduled date of colonoscopy/egd  (as of today): 1/12/23  Physician performing colonoscopy: Dr Jake Landis  Location of colonoscopy: [facility]  Bowel prep reviewed with patient: lyle/dulcolax  Instructions reviewed with patient by: Osmar Glynn  Clearances:   [place] - Dr Kiesha Oliveira

## 2022-12-29 NOTE — TELEPHONE ENCOUNTER
Our mutual patient is scheduled for procedure:   1/12/23     On: 1/12/23     With: Dr Idalmis Fisher is taking the following blood thinner: Eliquis    Can this be stopped 2 days prior to the procedure?       Physician Approving clearance: ________________________

## 2022-12-29 NOTE — PROGRESS NOTES
[facility] - Outpatient Follow-up Note  Jemal Garcia 74 y o  male MRN: [id number]  Encounter: [id number]          ASSESSMENT AND PLAN:      1  Screening for colon cancer  Colonoscopy      2  Unintentional weight loss  Ambulatory referral to Gastroenterology    EGD    bisacodyl (DULCOLAX) 5 mg EC tablet    polyethylene glycol (GOLYTELY) 4000 mL solution      3  Protein-calorie malnutrition, unspecified severity ([place] [id number] )          Patient has not had colonoscopy in the past  Due for screening  Also with weight loss  Recommended to have repeat EGD and colonoscopy due to weight loss when he was seen in patient  Given no issues after eating, will not pursue pancreatic enzyme supplementation  He most likely has chronic pancreatitis per imaging      _____________________________________________________________      SUBJECTIVE: Patient with adrenal insufficiency, atrial fibrillation on Eliquis, subacute CVA, chronic pancreatitis who presented to the hospital with unintentional weight loss  We were consulted for that reason  Cross-sectional imaging revealed stone at the neck of the pancreas with dilated upstream pancreatic duct  Imaging was personally reviewed in PACS  Patient has atrophic pancreas with several calcifications throughout the pancreas  These are consistent with sequelae of chronic pancreatitis  There is also a 1 cm calculus at the neck of the pancreas with upstream dilation up to 6 mm  He does have history of heavy alcohol use in the past     REVIEW OF SYSTEMS IS OTHERWISE NEGATIVE  Historical Information   Past Medical History:   Diagnosis Date   • Diabetes mellitus ([facility] )    • Obesity, morbid ([facility] ) 11/14/2022     History reviewed  No pertinent surgical history    Social History   Social History     Substance and Sexual Activity   Alcohol Use Not Currently   • Alcohol/week: 5 0 standard drinks   • Types: 5 Cans of beer per week    Comment: Quit in august     Social History     Substance and Sexual Activity   Drug Use Never     Social History     Tobacco Use   Smoking Status Former   • Packs/day: 0 25   • Years: 30 00   • Pack years: 7 50   • Types: Cigarettes   Smokeless Tobacco Never     History reviewed  No pertinent family history  Meds/Allergies       Current Outpatient Medications:   •  apixaban (ELIQUIS) 5 mg  •  aspirin 81 mg chewable tablet  •  atorvastatin (LIPITOR) 10 mg tablet  •  calcitriol (ROCALTROL) 0 25 mcg capsule  •  cyanocobalamin (VITAMIN B-12) 100 mcg tablet  •  fludrocortisone (FLORINEF) 0 1 mg tablet  •  hydrocortisone (CORTEF) 5 mg tablet  •  insulin glargine (Toujeo SoloStar) 300 units/mL CONCENTRATED U-300 injection pen (1-unit dial)  •  insulin lispro (HumaLOG KwikPen) 100 units/mL injection pen  •  Insulin Pen Needle (BD Pen Needle Blessing 2nd Gen) 32G X 4 MM MISC  •  Insulin Pen Needle (BD Pen Needle Blessing 2nd Gen) 32G X 4 MM MISC  •  metoprolol tartrate (LOPRESSOR) 25 mg tablet  •  midodrine (PROAMATINE) 10 MG tablet  •  midodrine (PROAMATINE) 2 5 mg tablet  •  NON FORMULARY  •  potassium chloride (Klor-Con) 10 mEq tablet  •  risperiDONE (RisperDAL) 1 mg tablet  •  sertraline (ZOLOFT) 100 mg tablet    Allergies   Allergen Reactions   • Imipramine Other (See Comments)   • Lisinopril Other (See Comments)   • Paroxetine Other (See Comments)   • Penicillins Hives   • Rosiglitazone Other (See Comments)   • Troglitazone Other (See Comments)           Objective     Blood pressure 110/72, temperature (!) 97 2 °F (36 2 °C), temperature source Tympanic, height 5' 4" (1 626 m), weight 83 9 kg (185 lb)  Body mass index is 31 76 kg/m²  PHYSICAL EXAM:      General Appearance:   Alert, cooperative, no distress   HEENT:   Normocephalic, atraumatic, anicteric           Lungs:   Equal chest rise and unlabored breathing, normal cough   Heart:   No visualized JVD   Abdomen:   Soft, non-tender, non-distended; no masses, no organomegaly    Genitalia:   Deferred Rectal:   Deferred    Extremities:  No cyanosis, clubbing or edema    Pulses:  Musculoskeletal:  2+ and symmetric  Normal range of motion visualized    Skin:  Neuro:  No jaundice, rashes, or lesions   Alert and appropriate           Lab Results:   No visits with results within 1 Day(s) from this visit  Latest known visit with results is:   No results displayed because visit has over 200 results  Radiology Results:   CT head wo contrast    Result Date: 12/6/2022  Narrative: CT BRAIN - WITHOUT CONTRAST INDICATION:   Mental status change, persistent or worsening worsening speech deficits  COMPARISON:  None  TECHNIQUE:  CT examination of the brain was performed  In addition to axial images, sagittal and coronal 2D reformatted images were created and submitted for interpretation  Radiation dose length product (DLP) for this visit:  863 79 mGy-cm   This examination, like all CT scans performed in the [facility], was performed utilizing techniques to minimize radiation dose exposure, including the use of iterative  reconstruction and automated exposure control  IMAGE QUALITY:  Diagnostic  FINDINGS[de-identified] Decreased attenuation is noted in periventricular and subcortical white matter demonstrating an appearance that is statistically most likely to represent mild microangiopathic change; this appearance is similar when compared to most recent prior examination  Chronic lacunar infarction(s) are noted in basal ganglia, unchanged from prior exam  No CT signs of acute infarction  No intracranial mass, mass effect or midline shift  No acute parenchymal hemorrhage  VENTRICLES AND EXTRA-AXIAL SPACES:  Normal for the patient's age  VISUALIZED ORBITS AND PARANASAL SINUSES:  Normal visualized orbits  Chronic right maxillary sinus mucosal disease and periosteal thickening similar in appearance to the recent comparison study in keeping with chronic sinusitis   CALVARIUM AND EXTRACRANIAL SOFT TISSUES: Normal      Impression: No acute intracranial abnormality  Chronic microangiopathic changes  1 Similar appearing chronic right maxillary sinusitis  Workstation performed: [id number]     CT chest abdomen pelvis w contrast    Result Date: 11/30/2022  Narrative: CT CHEST, ABDOMEN AND PELVIS WITH IV CONTRAST INDICATION:   Weight loss, unintended  eval for malignancy iso unintentional weight loss  As per review of electronic medical record, patient with history of prior stroke on Eliquis, diabetes, and adrenal insufficiency who presented secondary to hypotension  COMPARISON:  Chest radiograph from 11/28/2022 and abdominal radiograph from 10/20/2022  TECHNIQUE: CT examination of the chest, abdomen and pelvis was performed  Axial, sagittal, and coronal 2D reformatted images were created from the source data and submitted for interpretation  Radiation dose length product (DLP) for this visit:  1131 98 mGy-cm   This examination, like all CT scans performed in the [facility], was performed utilizing techniques to minimize radiation dose exposure, including the use of iterative reconstruction and automated exposure control  IV Contrast:  99 mL of iohexol (OMNIPAQUE) Enteric Contrast: Enteric contrast was not administered  FINDINGS: CHEST BRONCHOPULMONARY:  Clear central airways  Minimal atelectasis seen at lung bases  No other airspace opacities  There are is a micronodule in the left apex and in the right upper lobe (series 3 image 22 and 47, respectively)  PLEURA:  Small right pleural effusion  No pneumothorax  HEART/GREAT VESSELS: The heart is normal in size  There are coronary artery calcifications  No pericardial effusion  Normal caliber thoracic aorta with no dissection  MEDIASTINUM/LYMPH NODES:  No axillary, mediastinal or hilar lymphadenopathy  CHEST WALL AND LOWER NECK:  Unremarkable  ABDOMEN LIVER/BILIARY TREE:  Normal liver morphology  No focal hepatic lesions  No biliary ductal dilation  GALLBLADDER:  No calcified gallstones  No pericholecystic inflammatory change  SPLEEN: Unremarkable  PANCREAS: The pancreas is diffusely atrophic  There is no evidence of acute pancreatitis or definite focal pancreatic lesion  Several calcifications are seen in the pancreas, mostly in the head, suggestive of sequela of chronic pancreatitis  There is a 1 x 0 5 cm calculus in the neck of the pancreas (series 601 image 76),  The main pancreatic duct is dilated up to 6 mm just distal to this calculus (for instance series 601 image 69) and tapers to smaller caliber more distally  ADRENAL GLANDS:  Unremarkable  KIDNEYS/URETERS:  Symmetric renal enhancement  No intrarenal or ureteral calculi  No hydronephrosis  No focal renal lesions  STOMACH AND BOWEL:  Evaluation of the gastrointestinal tract is somewhat limited by lack of oral contrast and evaluation of the small bowel limited by underdistention  No bowel obstruction or convincing inflammation  The stomach is distended with ingested material   Stool is seen throughout the colon and in the rectum  The sigmoid colon is redundant and loops into the mid abdomen  APPENDIX:  The appendix is not visualized, however there are no secondary findings of appendicitis  ABDOMINOPELVIC CAVITY:  No ascites or pneumoperitoneum  LYMPH NODES: No abdominal or pelvic lymphadenopathy  VESSELS: Normal caliber aorta  Patent major branch vessels  Extensive vascular calcifications are seen, mostly in the smaller arteries, likely related to history of diabetes  PELVIS REPRODUCTIVE ORGANS:  The prostate gland is not enlarged  URINARY BLADDER:  The urinary bladder is somewhat distended, extending above the level of the umbilicus  ABDOMINAL WALL/INGUINAL REGIONS: No ventral abdominal wall hernia  MUSCULOSKELETAL:  No focal aggressive osseous lesions  Degenerative changes of the spine  Impression: No convincing evidence of a primary or metastatic malignancy in the chest, abdomen or pelvis  Diffusely atrophic pancreas with several calcifications suggestive of sequela of chronic pancreatitis  1 0 x 0 5 cm calculus in pancreatic neck, with main pancreatic duct dilated up to 6 mm just distal to this calculus, indicating it may be within the main pancreatic duct  The main pancreatic duct tapers to smaller caliber more distally  No definite pancreatic lesions  Somewhat distended urinary bladder, extending above the level of the umbilicus  Recommend clinical correlation for urinary retention  Stool throughout the colon, suggestive of constipation  No bowel obstruction or convincing bowel inflammation  Small right pleural effusion  Minimal atelectasis at lung bases with no other airspace opacities  No suspicious pulmonary nodules  Additional findings as above  The study was marked in [facility] for immediate notification   Workstation performed: [id number]

## 2023-01-06 ENCOUNTER — APPOINTMENT (OUTPATIENT)
Dept: RADIOLOGY | Facility: HOSPITAL | Age: 68
End: 2023-01-06

## 2023-01-06 ENCOUNTER — HOSPITAL ENCOUNTER (INPATIENT)
Facility: HOSPITAL | Age: 68
LOS: 6 days | Discharge: HOME HEALTH | End: 2023-01-13
Attending: EMERGENCY MEDICINE | Admitting: INTERNAL MEDICINE

## 2023-01-06 DIAGNOSIS — E27.40 ADRENAL INSUFFICIENCY (HCC): ICD-10-CM

## 2023-01-06 DIAGNOSIS — E10.9 INSULIN DEPENDENT TYPE 1 DIABETES MELLITUS (HCC): ICD-10-CM

## 2023-01-06 DIAGNOSIS — E10.65 TYPE 1 DIABETES MELLITUS WITH HYPERGLYCEMIA (HCC): ICD-10-CM

## 2023-01-06 DIAGNOSIS — I95.9 LOW BLOOD PRESSURE: Primary | ICD-10-CM

## 2023-01-06 DIAGNOSIS — R55 SYNCOPE: ICD-10-CM

## 2023-01-06 DIAGNOSIS — I95.1 ORTHOSTATIC HYPOTENSION: ICD-10-CM

## 2023-01-06 LAB
2HR DELTA HS TROPONIN: -1 NG/L
4HR DELTA HS TROPONIN: 0 NG/L
ALBUMIN SERPL BCP-MCNC: 3.1 G/DL (ref 3.5–5)
ALP SERPL-CCNC: 76 U/L (ref 46–116)
ALT SERPL W P-5'-P-CCNC: 13 U/L (ref 12–78)
ANION GAP SERPL CALCULATED.3IONS-SCNC: 3 MMOL/L (ref 4–13)
AST SERPL W P-5'-P-CCNC: 8 U/L (ref 5–45)
ATRIAL RATE: 75 BPM
BASOPHILS # BLD AUTO: 0.04 THOUSANDS/ÂΜL (ref 0–0.1)
BASOPHILS NFR BLD AUTO: 1 % (ref 0–1)
BILIRUB SERPL-MCNC: 0.32 MG/DL (ref 0.2–1)
BUN SERPL-MCNC: 18 MG/DL (ref 5–25)
CALCIUM ALBUM COR SERPL-MCNC: 9.2 MG/DL (ref 8.3–10.1)
CALCIUM SERPL-MCNC: 8.5 MG/DL (ref 8.3–10.1)
CARDIAC TROPONIN I PNL SERPL HS: 10 NG/L
CARDIAC TROPONIN I PNL SERPL HS: 11 NG/L
CARDIAC TROPONIN I PNL SERPL HS: 11 NG/L
CHLORIDE SERPL-SCNC: 112 MMOL/L (ref 96–108)
CO2 SERPL-SCNC: 25 MMOL/L (ref 21–32)
CREAT SERPL-MCNC: 1.26 MG/DL (ref 0.6–1.3)
EOSINOPHIL # BLD AUTO: 0.11 THOUSAND/ÂΜL (ref 0–0.61)
EOSINOPHIL NFR BLD AUTO: 1 % (ref 0–6)
ERYTHROCYTE [DISTWIDTH] IN BLOOD BY AUTOMATED COUNT: 15.9 % (ref 11.6–15.1)
GFR SERPL CREATININE-BSD FRML MDRD: 59 ML/MIN/1.73SQ M
GLUCOSE SERPL-MCNC: 110 MG/DL (ref 65–140)
GLUCOSE SERPL-MCNC: 123 MG/DL (ref 65–140)
GLUCOSE SERPL-MCNC: 132 MG/DL (ref 65–140)
GLUCOSE SERPL-MCNC: 154 MG/DL (ref 65–140)
GLUCOSE SERPL-MCNC: 176 MG/DL (ref 65–140)
GLUCOSE SERPL-MCNC: 66 MG/DL (ref 65–140)
GLUCOSE SERPL-MCNC: 88 MG/DL (ref 65–140)
HCT VFR BLD AUTO: 30.3 % (ref 36.5–49.3)
HGB BLD-MCNC: 9.4 G/DL (ref 12–17)
IMM GRANULOCYTES # BLD AUTO: 0.02 THOUSAND/UL (ref 0–0.2)
IMM GRANULOCYTES NFR BLD AUTO: 0 % (ref 0–2)
LYMPHOCYTES # BLD AUTO: 2.32 THOUSANDS/ÂΜL (ref 0.6–4.47)
LYMPHOCYTES NFR BLD AUTO: 28 % (ref 14–44)
MCH RBC QN AUTO: 28.1 PG (ref 26.8–34.3)
MCHC RBC AUTO-ENTMCNC: 31 G/DL (ref 31.4–37.4)
MCV RBC AUTO: 91 FL (ref 82–98)
MONOCYTES # BLD AUTO: 1.1 THOUSAND/ÂΜL (ref 0.17–1.22)
MONOCYTES NFR BLD AUTO: 13 % (ref 4–12)
NEUTROPHILS # BLD AUTO: 4.66 THOUSANDS/ÂΜL (ref 1.85–7.62)
NEUTS SEG NFR BLD AUTO: 57 % (ref 43–75)
NRBC BLD AUTO-RTO: 0 /100 WBCS
P AXIS: 28 DEGREES
PLATELET # BLD AUTO: 204 THOUSANDS/UL (ref 149–390)
PMV BLD AUTO: 10.5 FL (ref 8.9–12.7)
POTASSIUM SERPL-SCNC: 3.7 MMOL/L (ref 3.5–5.3)
PR INTERVAL: 138 MS
PROT SERPL-MCNC: 6.3 G/DL (ref 6.4–8.4)
QRS AXIS: 28 DEGREES
QRSD INTERVAL: 72 MS
QT INTERVAL: 382 MS
QTC INTERVAL: 426 MS
RBC # BLD AUTO: 3.34 MILLION/UL (ref 3.88–5.62)
SODIUM SERPL-SCNC: 140 MMOL/L (ref 135–147)
T WAVE AXIS: -58 DEGREES
VENTRICULAR RATE: 75 BPM
WBC # BLD AUTO: 8.25 THOUSAND/UL (ref 4.31–10.16)

## 2023-01-06 RX ORDER — HYDROCORTISONE 10 MG/1
10 TABLET ORAL EVERY EVENING
Status: DISCONTINUED | OUTPATIENT
Start: 2023-01-06 | End: 2023-01-08

## 2023-01-06 RX ORDER — MIDODRINE HYDROCHLORIDE 5 MG/1
10 TABLET ORAL
Status: DISCONTINUED | OUTPATIENT
Start: 2023-01-06 | End: 2023-01-13 | Stop reason: HOSPADM

## 2023-01-06 RX ORDER — CALCITRIOL 0.25 UG/1
0.25 CAPSULE, LIQUID FILLED ORAL DAILY
Status: DISCONTINUED | OUTPATIENT
Start: 2023-01-06 | End: 2023-01-13 | Stop reason: HOSPADM

## 2023-01-06 RX ORDER — POTASSIUM CHLORIDE 750 MG/1
20 TABLET, EXTENDED RELEASE ORAL 2 TIMES DAILY
Status: DISCONTINUED | OUTPATIENT
Start: 2023-01-06 | End: 2023-01-13 | Stop reason: HOSPADM

## 2023-01-06 RX ORDER — INSULIN LISPRO 100 [IU]/ML
1-5 INJECTION, SOLUTION INTRAVENOUS; SUBCUTANEOUS
Status: DISCONTINUED | OUTPATIENT
Start: 2023-01-06 | End: 2023-01-13 | Stop reason: HOSPADM

## 2023-01-06 RX ORDER — MIDODRINE HYDROCHLORIDE 5 MG/1
5 TABLET ORAL
Status: DISCONTINUED | OUTPATIENT
Start: 2023-01-06 | End: 2023-01-06

## 2023-01-06 RX ORDER — INSULIN GLARGINE 100 [IU]/ML
18 INJECTION, SOLUTION SUBCUTANEOUS
Status: DISCONTINUED | OUTPATIENT
Start: 2023-01-06 | End: 2023-01-08

## 2023-01-06 RX ORDER — ATORVASTATIN CALCIUM 10 MG/1
10 TABLET, FILM COATED ORAL DAILY
Status: DISCONTINUED | OUTPATIENT
Start: 2023-01-06 | End: 2023-01-13 | Stop reason: HOSPADM

## 2023-01-06 RX ORDER — FLUDROCORTISONE ACETATE 0.1 MG/1
0.2 TABLET ORAL DAILY
Status: DISCONTINUED | OUTPATIENT
Start: 2023-01-06 | End: 2023-01-13 | Stop reason: HOSPADM

## 2023-01-06 RX ORDER — RISPERIDONE 1 MG/1
1 TABLET ORAL 2 TIMES DAILY
Status: DISCONTINUED | OUTPATIENT
Start: 2023-01-06 | End: 2023-01-13 | Stop reason: HOSPADM

## 2023-01-06 RX ORDER — ASPIRIN 81 MG/1
81 TABLET, CHEWABLE ORAL DAILY
Status: DISCONTINUED | OUTPATIENT
Start: 2023-01-06 | End: 2023-01-13 | Stop reason: HOSPADM

## 2023-01-06 RX ORDER — INSULIN LISPRO 100 [IU]/ML
6 INJECTION, SOLUTION INTRAVENOUS; SUBCUTANEOUS
Status: DISCONTINUED | OUTPATIENT
Start: 2023-01-06 | End: 2023-01-08

## 2023-01-06 RX ORDER — SERTRALINE HYDROCHLORIDE 100 MG/1
100 TABLET, FILM COATED ORAL DAILY
Status: DISCONTINUED | OUTPATIENT
Start: 2023-01-06 | End: 2023-01-13 | Stop reason: HOSPADM

## 2023-01-06 RX ADMIN — INSULIN LISPRO 1 UNITS: 100 INJECTION, SOLUTION INTRAVENOUS; SUBCUTANEOUS at 17:53

## 2023-01-06 RX ADMIN — INSULIN HUMAN 6 UNITS: 100 INJECTION, SOLUTION PARENTERAL at 15:12

## 2023-01-06 RX ADMIN — VITAM B12 100 MCG: 100 TAB at 15:46

## 2023-01-06 RX ADMIN — POTASSIUM CHLORIDE 20 MEQ: 750 TABLET, EXTENDED RELEASE ORAL at 17:52

## 2023-01-06 RX ADMIN — ATORVASTATIN CALCIUM 10 MG: 10 TABLET, FILM COATED ORAL at 15:14

## 2023-01-06 RX ADMIN — APIXABAN 5 MG: 5 TABLET, FILM COATED ORAL at 17:52

## 2023-01-06 RX ADMIN — INSULIN GLARGINE 18 UNITS: 100 INJECTION, SOLUTION SUBCUTANEOUS at 21:13

## 2023-01-06 RX ADMIN — MIDODRINE HYDROCHLORIDE 10 MG: 5 TABLET ORAL at 15:45

## 2023-01-06 RX ADMIN — HYDROCORTISONE 10 MG: 10 TABLET ORAL at 15:46

## 2023-01-06 RX ADMIN — FLUDROCORTISONE ACETATE 0.2 MG: 0.1 TABLET ORAL at 15:46

## 2023-01-06 RX ADMIN — RISPERIDONE 1 MG: 1 TABLET ORAL at 17:52

## 2023-01-06 RX ADMIN — ASPIRIN 81 MG: 81 TABLET, CHEWABLE ORAL at 15:14

## 2023-01-06 RX ADMIN — SERTRALINE 100 MG: 100 TABLET, FILM COATED ORAL at 15:47

## 2023-01-06 RX ADMIN — Medication 12.5 MG: at 17:52

## 2023-01-06 RX ADMIN — CALCITRIOL CAPSULES 0.25 MCG 0.25 MCG: 0.25 CAPSULE ORAL at 15:47

## 2023-01-06 RX ADMIN — INSULIN LISPRO 6 UNITS: 100 INJECTION, SOLUTION INTRAVENOUS; SUBCUTANEOUS at 17:52

## 2023-01-06 NOTE — ASSESSMENT & PLAN NOTE
· Syncope/near syncope x2-3  · Most likely suspect secondary to orthostatic hypotension only  One of the times blood pressure was 66/42 taken by the daughter  · Will check orthostatic vitals, current blood pressure laying down are normal, treat and monitor orthostatic hypotension  · EKG - NSR  Will order troponins  · Lost 4 lb in few days - ? If that contributing  He is scheduled to get EGD and colonoscopy end of this month  · He had episodes of urinary and bowel incontinence yesterday at home which is new    We will get CT of head

## 2023-01-06 NOTE — ASSESSMENT & PLAN NOTE
· Labs with endocrine outpatient  · Continue Florinef 0 2 mg daily, hydrocortisone 50 mg in a m  and 10 mg in evening

## 2023-01-06 NOTE — ASSESSMENT & PLAN NOTE
· History of such  · Current home regimen -Florinef 0 2 daily, hydrocortisone 50 mg in a m  and 10 mg in p m , midodrine 10 mg 3 times daily    Was increased from twice daily to 3 times daily about 2 weeks ago  · Seems like despite this regimen he was having orthostatic hypotension and near syncope today  · He has had some loose bowel movements and episodes of urinary and bowel incontinence yesterday which were new but not iman diarrhea  · Has had lost 4 pounds in a few days  · For now we will continue the bowel regimen, apply stockings, abdominal binder and check orthostatics qshift

## 2023-01-06 NOTE — ED ATTENDING ATTESTATION
1/6/2023  Dez Gould DO, saw and evaluated the patient  I have discussed the patient with the resident/non-physician practitioner and agree with the resident's/non-physician practitioner's findings, Plan of Care, and MDM as documented in the resident's/non-physician practitioner's note, except where noted  All available labs and Radiology studies were reviewed  I was present for key portions of any procedure(s) performed by the resident/non-physician practitioner and I was immediately available to provide assistance  At this point I agree with the current assessment done in the Emergency Department  I have conducted an independent evaluation of this patient a history and physical is as follows:    Patient presents via EMS for complaint of symptomatic hypoglycemia in which he felt weak and nauseated  He states he checked his sugar at home, finding it to be 90  He called 911 and then ate some oatmeal   EMS found his blood sugar to be 200 but concomitantly found his systolic blood pressure in the 70s  Epic records indicate that the patient was admitted recently for hypotension in which his medications were adjusted, notably changing his midodrine from as needed to twice daily routinely  Patient insists he is taking it as directed  He is still prescribed metoprolol 12 5 mg daily which she is also taking  During that last visit, his insulin dosing and hydrocortisone dosing were also changed  In the ED, he no longer has complaints of feeling hypoglycemic and is not hypotensive, though he is borderline  ED Accu-Chek was 132 mg/dL  No recent travel or sick contacts  ROS: Presently denies f/c, HA, CP, SOB, abdominal pain, n/v/d or dysuria  12 system ROS o/w negative      PE: NAD, appears comfortable, alert; PERRL, EOMI; MMM, no posterior oropharyngeal exudate, edema or erythema, edentulous; HRR, no murmur, monitor shows sinus rhythm at 78 bpm; lungs CTA w/o w/r/r, POx 100% on RA (nl); abdomen s/nt/nd, nl BS in all 4 quadrant; (-) LE edema or calf TTP, FROM extremities x4; skin p/w/d; CNs GI/NF, oriented  MDM: Hypoglycemia and hypotension - medication excess/side effects, calorie deficit, no clinical support for infection, organ failure or inappropriate use of medications  A/P: Will check basic labs, treat symptoms, reevaluate for further work up and disposition      ED Course         Critical Care Time  Procedures

## 2023-01-06 NOTE — PLAN OF CARE
Problem: PAIN - ADULT  Goal: Verbalizes/displays adequate comfort level or baseline comfort level  Description: Interventions:  - Encourage patient to monitor pain and request assistance  - Assess pain using appropriate pain scale  - Administer analgesics based on type and severity of pain and evaluate response  - Implement non-pharmacological measures as appropriate and evaluate response  - Consider cultural and social influences on pain and pain management  - Notify physician/advanced practitioner if interventions unsuccessful or patient reports new pain  Outcome: Progressing     Problem: INFECTION - ADULT  Goal: Absence or prevention of progression during hospitalization  Description: INTERVENTIONS:  - Assess and monitor for signs and symptoms of infection  - Monitor lab/diagnostic results  - Monitor all insertion sites, i e  indwelling lines, tubes, and drains  - Monitor endotracheal if appropriate and nasal secretions for changes in amount and color  - Winside appropriate cooling/warming therapies per order  - Administer medications as ordered  - Instruct and encourage patient and family to use good hand hygiene technique  - Identify and instruct in appropriate isolation precautions for identified infection/condition  Outcome: Progressing  Goal: Absence of fever/infection during neutropenic period  Description: INTERVENTIONS:  - Monitor WBC    Outcome: Progressing     Problem: DISCHARGE PLANNING  Goal: Discharge to home or other facility with appropriate resources  Description: INTERVENTIONS:  - Identify barriers to discharge w/patient and caregiver  - Arrange for needed discharge resources and transportation as appropriate  - Identify discharge learning needs (meds, wound care, etc )  - Arrange for interpretive services to assist at discharge as needed  - Refer to Case Management Department for coordinating discharge planning if the patient needs post-hospital services based on physician/advanced practitioner order or complex needs related to functional status, cognitive ability, or social support system  Outcome: Progressing     Problem: Knowledge Deficit  Goal: Patient/family/caregiver demonstrates understanding of disease process, treatment plan, medications, and discharge instructions  Description: Complete learning assessment and assess knowledge base    Interventions:  - Provide teaching at level of understanding  - Provide teaching via preferred learning methods  Outcome: Progressing     Problem: CARDIOVASCULAR - ADULT  Goal: Maintains optimal cardiac output and hemodynamic stability  Description: INTERVENTIONS:  - Monitor I/O, vital signs and rhythm  - Monitor for S/S and trends of decreased cardiac output  - Administer and titrate ordered vasoactive medications to optimize hemodynamic stability  - Assess quality of pulses, skin color and temperature  - Assess for signs of decreased coronary artery perfusion  - Instruct patient to report change in severity of symptoms  Outcome: Progressing  Goal: Absence of cardiac dysrhythmias or at baseline rhythm  Description: INTERVENTIONS:  - Continuous cardiac monitoring, vital signs, obtain 12 lead EKG if ordered  - Administer antiarrhythmic and heart rate control medications as ordered  - Monitor electrolytes and administer replacement therapy as ordered  Outcome: Progressing     Problem: GASTROINTESTINAL - ADULT  Goal: Minimal or absence of nausea and/or vomiting  Description: INTERVENTIONS:  - Administer IV fluids if ordered to ensure adequate hydration  - Maintain NPO status until nausea and vomiting are resolved  - Nasogastric tube if ordered  - Administer ordered antiemetic medications as needed  - Provide nonpharmacologic comfort measures as appropriate  - Advance diet as tolerated, if ordered  - Consider nutrition services referral to assist patient with adequate nutrition and appropriate food choices  Outcome: Progressing  Goal: Maintains or returns to baseline bowel function  Description: INTERVENTIONS:  - Assess bowel function  - Encourage oral fluids to ensure adequate hydration  - Administer IV fluids if ordered to ensure adequate hydration  - Administer ordered medications as needed  - Encourage mobilization and activity  - Consider nutritional services referral to assist patient with adequate nutrition and appropriate food choices  Outcome: Progressing  Goal: Maintains adequate nutritional intake  Description: INTERVENTIONS:  - Monitor percentage of each meal consumed  - Identify factors contributing to decreased intake, treat as appropriate  - Assist with meals as needed  - Monitor I&O, weight, and lab values if indicated  - Obtain nutrition services referral as needed  Outcome: Progressing     Problem: METABOLIC, FLUID AND ELECTROLYTES - ADULT  Goal: Electrolytes maintained within normal limits  Description: INTERVENTIONS:  - Monitor labs and assess patient for signs and symptoms of electrolyte imbalances  - Administer electrolyte replacement as ordered  - Monitor response to electrolyte replacements, including repeat lab results as appropriate  - Instruct patient on fluid and nutrition as appropriate  Outcome: Progressing  Goal: Glucose maintained within target range  Description: INTERVENTIONS:  - Monitor Blood Glucose as ordered  - Assess for signs and symptoms of hyperglycemia and hypoglycemia  - Administer ordered medications to maintain glucose within target range  - Assess nutritional intake and initiate nutrition service referral as needed  Outcome: Progressing

## 2023-01-06 NOTE — ED PROVIDER NOTES
History  Chief Complaint   Patient presents with   • Altered Mental Status     EMS was called for pt being hypoglycemic  BG for EMS was 200  EMS reports pt BP being in 76s upon arrival     47-year-old male with history of adrenal insufficiency presenting due to altered mental status and syncopal episode today  EMS report was that they were called for hypoglycemia  On arrival patient had blood sugar in the 90s and was overall well-appearing  However, when daughter arrived to provide further history, she noted that patient has been having problems with his blood pressure and had a systolic of 60 today leading to 2 syncopal episodes and transient altered mental status  They have been talking with the family doctor who attempted to adjust medications such as stopping metoprolol and increasing midodrine to 3 times daily, but this has not been helping  Patient has had multiple falls throughout the past week  He also has periods of transient change in mental status which improves once his blood pressure does  These situations do not always occur in the morning, they happen anytime throughout the day  She initially attempted to manage this outpatient, but at this point in time does not feel it is safe for patient to continue in this manner  Patient denies any complaints at this time  He does not remember the events from this morning  He is unable to provide any further significant history  Prior to Admission Medications   Prescriptions Last Dose Informant Patient Reported? Taking? Insulin Pen Needle (BD Pen Needle Blessing 2nd Gen) 32G X 4 MM MISC Unknown  No No   Sig: For use with insulin pen  Pharmacy may dispense brand covered by insurance  Insulin Pen Needle (BD Pen Needle Blessing 2nd Gen) 32G X 4 MM MISC Unknown  No No   Sig: For use with insulin pen  Pharmacy may dispense brand covered by insurance     NON FORMULARY Unknown  Yes No   Si Bottle if needed Relion Glucose   apixaban (ELIQUIS) 5 mg Unknown No No   Sig: Take 1 tablet (5 mg total) by mouth 2 (two) times a day   aspirin 81 mg chewable tablet Unknown  Yes No   Sig: Chew 81 mg daily   atorvastatin (LIPITOR) 10 mg tablet Unknown  Yes No   Sig: Take 1 tablet by mouth daily   bisacodyl (DULCOLAX) 5 mg EC tablet   No No   Sig: Take 2 tablets (10 mg total) by mouth once for 1 dose   calcitriol (ROCALTROL) 0 25 mcg capsule Unknown  No No   Sig: Take 1 capsule (0 25 mcg total) by mouth daily   cyanocobalamin (VITAMIN B-12) 100 mcg tablet Unknown  Yes No   Sig: Take by mouth daily   fludrocortisone (FLORINEF) 0 1 mg tablet Unknown  No No   Sig: Take 2 tablets (0 2 mg total) by mouth daily   hydrocortisone (CORTEF) 5 mg tablet Unknown  No No   Sig: Take 15mg(3 tabs) on waking up and  10mg(2 tabs) in evening   insulin glargine (Toujeo SoloStar) 300 units/mL CONCENTRATED U-300 injection pen (1-unit dial) Unknown  No No   Sig: Inject 18 Units under the skin daily at bedtime   insulin lispro (HumaLOG KwikPen) 100 units/mL injection pen Unknown  No No   Sig: Inject 6 Units under the skin 3 (three) times a day with meals 7 with breakfast, 6 units with lunch and dinner   Plus sliding scale   metoprolol tartrate (LOPRESSOR) 25 mg tablet Unknown  Yes No   Sig: Take 12 5 mg by mouth 2 (two) times a day   midodrine (PROAMATINE) 10 MG tablet Unknown  Yes No   midodrine (PROAMATINE) 2 5 mg tablet Unknown  No No   Sig: Take 1 tablet (2 5 mg total) by mouth 2 (two) times a day before meals   polyethylene glycol (GOLYTELY) 4000 mL solution   No No   Sig: Take 4,000 mL by mouth once for 1 dose   potassium chloride (Klor-Con) 10 mEq tablet Unknown  Yes No   Sig: Take 20 mEq by mouth 2 (two) times a day   risperiDONE (RisperDAL) 1 mg tablet Unknown  Yes No   Sig: Take 1 tablet by mouth 2 (two) times a day   sertraline (ZOLOFT) 100 mg tablet Unknown  Yes No   Sig: Take 100 mg by mouth daily      Facility-Administered Medications: None       Past Medical History:   Diagnosis Date   • Diabetes mellitus ([facility] )    • Obesity, morbid ([facility] ) 11/14/2022       History reviewed  No pertinent surgical history  History reviewed  No pertinent family history  I have reviewed and agree with the history as documented  E-Cigarette/Vaping   • E-Cigarette Use Never User      E-Cigarette/Vaping Substances     Social History     Tobacco Use   • Smoking status: Former     Packs/day: 0 25     Years: 30 00     Pack years: 7 50     Types: Cigarettes   • Smokeless tobacco: Never   Vaping Use   • Vaping Use: Never used   Substance Use Topics   • Alcohol use: Not Currently     Alcohol/week: 5 0 standard drinks     Types: 5 Cans of beer per week     Comment: Quit in august   • Drug use: Never        Review of Systems   Constitutional: Negative for chills and fever  HENT: Negative for ear pain and sore throat  Eyes: Negative for pain and visual disturbance  Respiratory: Negative for cough and shortness of breath  Cardiovascular: Negative for chest pain and palpitations  Gastrointestinal: Negative for abdominal pain and vomiting  Genitourinary: Negative for dysuria and hematuria  Musculoskeletal: Negative for arthralgias and back pain  Skin: Negative for color change and rash  Neurological: Positive for syncope  Negative for seizures  All other systems reviewed and are negative  Physical Exam  ED Triage Vitals [01/06/23 1036]   Temperature Pulse Respirations Blood Pressure SpO2   98 7 °F (37 1 °C) 78 20 104/57 100 %      Temp Source Heart Rate Source Patient Position - Orthostatic VS BP Location FiO2 (%)   Oral Monitor Sitting Right arm --      Pain Score       No Pain             Orthostatic Vital Signs  Vitals:    01/08/23 1113 01/08/23 1441 01/08/23 1907 01/08/23 2230   BP: 95/55 101/54 109/56 161/70   Pulse: 67 72 80 73   Patient Position - Orthostatic VS:  Sitting         Physical Exam  Vitals and nursing note reviewed  Constitutional:       General: He is not in acute distress  Appearance: He is well-developed  HENT:      Head: Normocephalic and atraumatic  Right Ear: External ear normal       Left Ear: External ear normal       Nose: Nose normal       Mouth/Throat:      Mouth: Mucous membranes are moist    Eyes:      Conjunctiva/sclera: Conjunctivae normal    Cardiovascular:      Rate and Rhythm: Normal rate and regular rhythm  Heart sounds: No murmur heard  Pulmonary:      Effort: Pulmonary effort is normal  No respiratory distress  Breath sounds: Normal breath sounds  Abdominal:      Palpations: Abdomen is soft  Tenderness: There is no abdominal tenderness  Musculoskeletal:         General: No swelling  Cervical back: Neck supple  Skin:     General: Skin is warm and dry  Capillary Refill: Capillary refill takes less than 2 seconds  Neurological:      General: No focal deficit present  Mental Status: He is alert     Psychiatric:         Mood and Affect: Mood normal          ED Medications  Medications   apixaban (ELIQUIS) tablet 5 mg (5 mg Oral Given 1/8/23 1709)   aspirin chewable tablet 81 mg (81 mg Oral Given 1/8/23 0837)   atorvastatin (LIPITOR) tablet 10 mg (10 mg Oral Given 1/8/23 0838)   calcitriol (ROCALTROL) capsule 0 25 mcg (0 25 mcg Oral Given 1/8/23 0837)   cyanocobalamin (VITAMIN B-12) tablet 100 mcg (100 mcg Oral Given 1/8/23 0838)   fludrocortisone (FLORINEF) tablet 0 2 mg (0 2 mg Oral Given 1/8/23 0839)   potassium chloride (K-DUR,KLOR-CON) CR tablet 20 mEq (20 mEq Oral Given 1/8/23 1709)   risperiDONE (RisperDAL) tablet 1 mg (1 mg Oral Given 1/8/23 1709)   sertraline (ZOLOFT) tablet 100 mg (100 mg Oral Given 1/8/23 0840)   insulin lispro (HumaLOG) 100 units/mL subcutaneous injection 1-5 Units (1 Units Subcutaneous Given 1/8/23 1732)   insulin lispro (HumaLOG) 100 units/mL subcutaneous injection 1-5 Units (1 Units Subcutaneous Given 1/8/23 2133)   midodrine (PROAMATINE) tablet 10 mg (10 mg Oral Given 1/8/23 1709) multi-electrolyte (PLASMALYTE-A/ISOLYTE-S PH 7 4) IV solution (50 mL/hr Intravenous New Bag 1/8/23 2022)   insulin lispro (HumaLOG) 100 units/mL subcutaneous injection 7 Units (7 Units Subcutaneous Given 1/8/23 1732)   insulin glargine (LANTUS) subcutaneous injection 20 Units 0 2 mL (20 Units Subcutaneous Given 1/8/23 2133)   hydrocortisone (CORTEF) tablet 20 mg (20 mg Oral Given 1/8/23 1709)   insulin regular (HumuLIN R,NovoLIN R) injection 6 Units (6 Units Subcutaneous Given 1/6/23 1512)       Diagnostic Studies  Results Reviewed     Procedure Component Value Units Date/Time    CBC and differential [[id number]]  (Abnormal) Collected: 01/07/23 0737    Lab Status: Final result Specimen: Blood from Arm, Left Updated: 01/07/23 0813     WBC 8 57 Thousand/uL      RBC 3 73 Million/uL      Hemoglobin 10 5 g/dL      Hematocrit 33 6 %      MCV 90 fL      MCH 28 2 pg      MCHC 31 3 g/dL      RDW 15 8 %      MPV 11 4 fL      Platelets 282 Thousands/uL      nRBC 0 /100 WBCs      Neutrophils Relative 58 %      Immat GRANS % 0 %      Lymphocytes Relative 26 %      Monocytes Relative 14 %      Eosinophils Relative 2 %      Basophils Relative 0 %      Neutrophils Absolute 4 93 Thousands/µL      Immature Grans Absolute 0 02 Thousand/uL      Lymphocytes Absolute 2 24 Thousands/µL      Monocytes Absolute 1 23 Thousand/µL      Eosinophils Absolute 0 13 Thousand/µL      Basophils Absolute 0 02 Thousands/µL     HS Troponin I 4hr [[id number]]  (Normal) Collected: 01/06/23 1916    Lab Status: Final result Specimen: Blood from Hand, Right Updated: 01/06/23 2029     hs TnI 4hr 11 ng/L      Delta 4hr hsTnI 0 ng/L     HS Troponin I 2hr [[id number]]  (Normal) Collected: 01/06/23 1704    Lab Status: Final result Specimen: Blood from Arm, Left Updated: 01/06/23 1802     hs TnI 2hr 10 ng/L      Delta 2hr hsTnI -1 ng/L     HS Troponin 0hr (reflex protocol) [[id number]]  (Normal) Collected: 01/06/23 1448    Lab Status: Final result Specimen: Blood from Arm, Left Updated: 01/06/23 1533     hs TnI 0hr 11 ng/L     Fingerstick Glucose (POCT) [[id number]]  (Abnormal) Collected: 01/06/23 1445    Lab Status: Final result Updated: 01/06/23 1445     POC Glucose 176 mg/dl     Fingerstick Glucose (POCT) [[id number]]  (Normal) Collected: 01/06/23 1037    Lab Status: Final result Updated: 01/06/23 1339     POC Glucose 132 mg/dl     Comprehensive metabolic panel [[id number]]  (Abnormal) Collected: 01/06/23 1053    Lab Status: Final result Specimen: Blood from Arm, Left Updated: 01/06/23 1126     Sodium 140 mmol/L      Potassium 3 7 mmol/L      Chloride 112 mmol/L      CO2 25 mmol/L      ANION GAP 3 mmol/L      BUN 18 mg/dL      Creatinine 1 26 mg/dL      Glucose 123 mg/dL      Calcium 8 5 mg/dL      Corrected Calcium 9 2 mg/dL      AST 8 U/L      ALT 13 U/L      Alkaline Phosphatase 76 U/L      Total Protein 6 3 g/dL      Albumin 3 1 g/dL      Total Bilirubin 0 32 mg/dL      eGFR 59 ml/min/1 73sq m     Narrative:      [place] guidelines for Chronic Kidney Disease (CKD):   •  Stage 1 with normal or high GFR (GFR > 90 mL/min/1 73 square meters)  •  Stage 2 Mild CKD (GFR = 60-89 mL/min/1 73 square meters)  •  Stage 3A Moderate CKD (GFR = 45-59 mL/min/1 73 square meters)  •  Stage 3B Moderate CKD (GFR = 30-44 mL/min/1 73 square meters)  •  Stage 4 Severe CKD (GFR = 15-29 mL/min/1 73 square meters)  •  Stage 5 End Stage CKD (GFR <15 mL/min/1 73 square meters)  Note: GFR calculation is accurate only with a steady state creatinine    CBC and differential [[id number]]  (Abnormal) Collected: 01/06/23 1053    Lab Status: Final result Specimen: Blood from Arm, Left Updated: 01/06/23 1100     WBC 8 25 Thousand/uL      RBC 3 34 Million/uL      Hemoglobin 9 4 g/dL      Hematocrit 30 3 %      MCV 91 fL      MCH 28 1 pg      MCHC 31 0 g/dL      RDW 15 9 %      MPV 10 5 fL      Platelets 666 Thousands/uL      nRBC 0 /100 WBCs      Neutrophils Relative 57 %      Immat GRANS % 0 %      Lymphocytes Relative 28 %      Monocytes Relative 13 %      Eosinophils Relative 1 %      Basophils Relative 1 %      Neutrophils Absolute 4 66 Thousands/µL      Immature Grans Absolute 0 02 Thousand/uL      Lymphocytes Absolute 2 32 Thousands/µL      Monocytes Absolute 1 10 Thousand/µL      Eosinophils Absolute 0 11 Thousand/µL      Basophils Absolute 0 04 Thousands/µL                  CT head wo contrast   Final Result by Sofía Miller MD (01/06 1534)      1  No acute intracranial CT abnormality  2   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 1 Filed at: 01/06/2023 1039   In the last 24 hours, have you needed more help than usual? 1 Filed at: 01/06/2023 1039   Have you been hospitalized for one or more nights during the past 6 months? 1 Filed at: 01/06/2023 1039   In general, do you see well? 0 Filed at: 01/06/2023 1039   In general, do you have serious problems with your memory? 1 Filed at: 01/06/2023 1039   Do you take more than three different medications every day? 1 Filed at: 01/06/2023 1039   ISAR Score 5 Filed at: 01/06/2023 1039                    SBIRT 22yo+    Flowsheet Row Most Recent Value   SBIRT (23 yo +)    In order to provide better care to our patients, we are screening all of our patients for alcohol and drug use  Would it be okay to ask you these screening questions? No Filed at: 01/06/2023 1059                Medical Decision Making  82-year-old male presenting due to transient hypotension with syncopal episodes and altered mental status  Will obtain cardiac labs and monitor patient emergency department  Blood pressure is remained stable throughout this time and he has had no further episodes  However, daughter notes that she feels he is very unsafe at home due to these frequent syncopal episodes and transient changes in his mental status  I agree that patient does not sound safe to continue management at home and should be monitored in the hospital while his medication changes are stabilized  Daughter is agreeable with plan  Patient admitted for further work-up and management  Low blood pressure: acute illness or injury  Syncope: self-limited or minor problem  Amount and/or Complexity of Data Reviewed  Labs: ordered  Decision-making details documented in ED Course  Risk  Decision regarding hospitalization  Disposition  Final diagnoses:   Low blood pressure   Syncope     Time reflects when diagnosis was documented in both MDM as applicable and the Disposition within this note     Time User Action Codes Description Comment    1/6/2023 12:00 PM Shar Rede Add [I95 9] Low blood pressure     1/6/2023  1:36 PM Yokubaitis, Nahun Add [R55] Syncope     1/7/2023 11:11 AM [place] Racer Add [I95 1] Orthostatic hypotension     1/8/2023  1:49 PM Agapito Racer Add [E10 65] Type 1 diabetes mellitus with hyperglycemia ([facility] )     1/8/2023  1:49 PM Agapito Racer Add [E27 40] Adrenal insufficiency        ED Disposition     ED Disposition   Admit    Condition   Stable    Date/Time   Fri Jan 6, 2023  1:36 PM    Comment   Case was discussed with Dr Jas Putnam and the patient's admission status was agreed to be Admission Status: observation status to the service of Dr Jas Putnam              Follow-up Information     Follow up With Specialties Details Why Contact Omega Barbosa Yadira Freeman MD Family Medicine In 1 week  [address] [facility]  [address] [address]  [address] [id number] [phone]            Current Discharge Medication List      CONTINUE these medications which have NOT CHANGED    Details   apixaban (ELIQUIS) 5 mg Take 1 tablet (5 mg total) by mouth 2 (two) times a day  Qty: 60 tablet, Refills: 2    Associated Diagnoses: Type 2 MI (myocardial infarction) (HCC)      aspirin 81 mg chewable tablet Chew 81 mg daily      atorvastatin (LIPITOR) 10 mg tablet Take 1 tablet by mouth daily      bisacodyl (DULCOLAX) 5 mg EC tablet Take 2 tablets (10 mg total) by mouth once for 1 dose  Qty: 2 tablet, Refills: 0    Associated Diagnoses: Unintentional weight loss      calcitriol (ROCALTROL) 0 25 mcg capsule Take 1 capsule (0 25 mcg total) by mouth daily  Qty: 30 capsule, Refills: 1    Associated Diagnoses: Stage 5 chronic kidney disease not on chronic dialysis (HCC)      cyanocobalamin (VITAMIN B-12) 100 mcg tablet Take by mouth daily      fludrocortisone (FLORINEF) 0 1 mg tablet Take 2 tablets (0 2 mg total) by mouth daily  Qty: 60 tablet, Refills: 5    Associated Diagnoses: Adrenal insufficiency (HCC)      hydrocortisone (CORTEF) 5 mg tablet Take 15mg(3 tabs) on waking up and  10mg(2 tabs) in evening  Qty: 180 tablet, Refills: 1    Associated Diagnoses: Adrenal insufficiency (HCC)      insulin glargine (Toujeo SoloStar) 300 units/mL CONCENTRATED U-300 injection pen (1-unit dial) Inject 18 Units under the skin daily at bedtime  Qty: 15 mL, Refills: 0    Associated Diagnoses: Insulin dependent type 1 diabetes mellitus (HCC)      insulin lispro (HumaLOG KwikPen) 100 units/mL injection pen Inject 6 Units under the skin 3 (three) times a day with meals 7 with breakfast, 6 units with lunch and dinner  Plus sliding scale    Associated Diagnoses: Insulin dependent type 1 diabetes mellitus ([address] )      ! ! Insulin Pen Needle (BD Pen Needle Blessing 2nd Gen) 32G X 4 MM MISC For use with insulin pen   Pharmacy may dispense brand covered by insurance  Qty: 100 each, Refills: 0    Associated Diagnoses: Insulin dependent type 1 diabetes mellitus ([address] )      ! ! Insulin Pen Needle (BD Pen Needle Blessing 2nd Gen) 32G X 4 MM MISC For use with insulin pen  Pharmacy may dispense brand covered by insurance  Qty: 100 each, Refills: 2    Associated Diagnoses: Insulin dependent type 1 diabetes mellitus (HCC)      metoprolol tartrate (LOPRESSOR) 25 mg tablet Take 12 5 mg by mouth 2 (two) times a day      !! midodrine (PROAMATINE) 10 MG tablet       !! midodrine (PROAMATINE) 2 5 mg tablet Take 1 tablet (2 5 mg total) by mouth 2 (two) times a day before meals  Qty: 60 tablet, Refills: 0    Associated Diagnoses: Hypotension      NON FORMULARY 1 Bottle if needed Relion Glucose      polyethylene glycol (GOLYTELY) 4000 mL solution Take 4,000 mL by mouth once for 1 dose  Qty: 4000 mL, Refills: 0    Associated Diagnoses: Unintentional weight loss      potassium chloride (Klor-Con) 10 mEq tablet Take 20 mEq by mouth 2 (two) times a day      risperiDONE (RisperDAL) 1 mg tablet Take 1 tablet by mouth 2 (two) times a day      sertraline (ZOLOFT) 100 mg tablet Take 100 mg by mouth daily       ! ! - Potential duplicate medications found  Please discuss with provider  No discharge procedures on file  PDMP Review     None           ED Provider  Attending physically available and evaluated Bry Thomas I managed the patient along with the ED Attending      Electronically Signed by         Ken Pena MD  01/09/23 4721

## 2023-01-06 NOTE — ASSESSMENT & PLAN NOTE
· Baseline seems to be between 8-10  · Today 9 4, 1 month ago 10 5  · Follow-up CBC in a m  to rule out any bleed and monitor for clinical bleed

## 2023-01-06 NOTE — H&P
[address] [facility]  [address] 1955, 74 y o  male MRN: [id number]  Unit/Bed#: ED 11 Encounter: [id number]  Primary Care Provider: Roger Maradiaga MD   Date and time admitted to hospital: 1/6/2023 10:30 AM    * Syncope  Assessment & Plan  · Syncope/near syncope x2-3  · Most likely suspect secondary to orthostatic hypotension only  One of the times blood pressure was 66/42 taken by the daughter  · Will check orthostatic vitals, current blood pressure laying down are normal, treat and monitor orthostatic hypotension  · EKG - NSR  Will order troponins  · Lost 4 lb in few days - ? If that contributing  He is scheduled to get EGD and colonoscopy end of this month  · He had episodes of urinary and bowel incontinence yesterday at home which is new  We will get CT of head    Orthostatic hypotension  Assessment & Plan  · History of such  · Current home regimen -Florinef 0 2 daily, hydrocortisone 50 mg in a m  and 10 mg in p m , midodrine 10 mg 3 times daily    Was increased from twice daily to 3 times daily about 2 weeks ago  · Seems like despite this regimen he was having orthostatic hypotension and near syncope today  · He has had some loose bowel movements and episodes of urinary and bowel incontinence yesterday which were new but not iman diarrhea  · Has had lost 4 pounds in a few days  · For now we will continue the bowel regimen, apply stockings, abdominal binder and check orthostatics qshift    Psychiatric disorder  Assessment & Plan  · Continue Risperidone and Zoloft at home dose    Anemia of chronic disease  Assessment & Plan  · Baseline seems to be between 8-10  · Today 9 4, 1 month ago 10 5  · Follow-up CBC in a m  to rule out any bleed and monitor for clinical bleed    History of stroke  Assessment & Plan  · Aspirin, statin, Eliquis    Paroxysmal atrial fibrillation   Assessment & Plan  · Metoprolol 12 5 mg twice daily  · Continue Eliquis    Adrenal insufficiency Assessment & Plan  · Labs with endocrine outpatient  · Continue Florinef 0 2 mg daily, hydrocortisone 50 mg in a m  and 10 mg in evening    Insulin dependent type 1 diabetes mellitus   Assessment & Plan  Lab Results   Component Value Date    HGBA1C 7 5 (H) 07/22/2022       Recent Labs     01/06/23  1037 01/06/23  1445   POCGLU 132 176*       Blood Sugar Average: Last 72 hrs:  (P) 154     · Continue home regimen of Lantus 8 units at bedtime, Humalog 6 units 3 times daily, add ISS  · Follow-up with endocrine      VTE Pharmacologic Prophylaxis:   Moderate Risk (Score 3-4) - Pharmacological DVT Prophylaxis Ordered: apixaban (Eliquis)  Code Status: Level 1 - Full Code   Discussion with family: Updated  (daughter) via phone  Anticipated Length of Stay: Patient will be admitted on an observation basis with an anticipated length of stay of less than 2 midnights secondary to monitor orthostatic hypotension & adjsut meds as needed, fall risk 2/2 to this  therapy eval     Total Time for Visit, including Counseling / Coordination of Care: 60 minutes Greater than 50% of this total time spent on direct patient counseling and coordination of care  Chief Complaint: syncope & near syncope at home    History of Present Illness:  Chuy Busby is a 74 y o  male with a PMH of paroxysmal A  fib, history of CVA, orthostatic hypotension, adrenal insufficiency, diabetes does follow with endocrinology for adrenal insufficiency and diabetes who presents from home today after 2-3 episodes of syncope/near syncope  Information is obtained from patient as well as from the daughter on phone with whom he lives  Apparently he lives at home with daughter Kristen and his friend  Upon my evaluation and asking the patient he states that he is here because of low blood sugar and then when I corrected him he did say that he is here for low blood pressure, according to him it was 90, could not describe it more    Per discussion with daughter since his stroke he does have some memory issues but today he had about 2-3 episodes where when he would stand up he would get dizzy lightheaded appeared pale with pinpoint pupils and would almost pass out, one of the times daughter had to hold him and land him to prevent a fall  1 of those times when she checked the blood pressure it was 66/42  This was also associated with disorientation and he is not looking good hence she called EMS  When EMS came he gave his age incorrect, looked at his daughter and said that he does not know who she is and asked who she is  This was concerning for the daughter  And she is overall concerned about his ongoing orthostatic hypotension issue which was really worse today  He was recently admitted and discharged in December at which time he was discharged on midodrine 2 5 mg twice daily along with stockings and binder and Florinef and hydrocortisone it seems like this has been increased outpatient and on 12/20 PCP increased it to 10 mg 3 times a day from 2 times a day  The daughter is concerned that despite this it continues to be low  Continuing oral intake, noticed some loose bowel movements but not iman diarrhea, noted to have urinary and bowel incontinence yesterday which is new  Noted to have 4 pound weight loss in few days recently    Review of Systems:  Review of Systems   Constitutional: Negative for activity change, appetite change, chills and fever  HENT: Negative for congestion and rhinorrhea  Respiratory: Negative for cough, shortness of breath and wheezing  Cardiovascular: Negative for chest pain and palpitations  Gastrointestinal: Negative for abdominal pain, diarrhea, nausea and vomiting  Genitourinary: Negative for difficulty urinating  Neurological: Negative for dizziness and light-headedness  All other systems reviewed and are negative        Past Medical and Surgical History:   Past Medical History:   Diagnosis Date   • Diabetes mellitus ([facility] [address] )    • Obesity, morbid ([facility] [address] ) 11/14/2022       History reviewed  No pertinent surgical history  Meds/Allergies:  Prior to Admission medications    Medication Sig Start Date End Date Taking? Authorizing Provider   apixaban (ELIQUIS) 5 mg Take 1 tablet (5 mg total) by mouth 2 (two) times a day 11/8/22   Irina Gillespie MD   aspirin 81 mg chewable tablet Chew 81 mg daily    Historical Provider, MD   atorvastatin (LIPITOR) 10 mg tablet Take 1 tablet by mouth daily 7/12/22   Historical Provider, MD   bisacodyl (DULCOLAX) 5 mg EC tablet Take 2 tablets (10 mg total) by mouth once for 1 dose 12/29/22 12/29/22  Donte Diamond MD   calcitriol (ROCALTROL) 0 25 mcg capsule Take 1 capsule (0 25 mcg total) by mouth daily 11/8/22   Arabella Crowell MD   cyanocobalamin (VITAMIN B-12) 100 mcg tablet Take by mouth daily 9/29/22   Historical Provider, MD   fludrocortisone (FLORINEF) 0 1 mg tablet Take 2 tablets (0 2 mg total) by mouth daily 11/8/22 11/8/23  Irina Gillespie MD   hydrocortisone (CORTEF) 5 mg tablet Take 15mg(3 tabs) on waking up and  10mg(2 tabs) in evening 12/27/22   Arabella Crowell MD   insulin glargine (Toujeo SoloStar) 300 units/mL CONCENTRATED U-300 injection pen (1-unit dial) Inject 18 Units under the skin daily at bedtime 12/27/22   Arabella Crowell MD   insulin lispro (HumaLOG KwikPen) 100 units/mL injection pen Inject 6 Units under the skin 3 (three) times a day with meals 7 with breakfast, 6 units with lunch and dinner  Plus sliding scale 12/8/22   Leonie Cook PA-C   Insulin Pen Needle (BD Pen Needle Blessing 2nd Gen) 32G X 4 MM MISC For use with insulin pen  Pharmacy may dispense brand covered by insurance  10/26/22   Brunilda Perla MD   Insulin Pen Needle (BD Pen Needle Blessing 2nd Gen) 32G X 4 MM MISC For use with insulin pen  Pharmacy may dispense brand covered by insurance   12/27/22   Arabella Crowell MD   metoprolol tartrate (LOPRESSOR) 25 mg tablet Take 12 5 mg by mouth 2 (two) times a day 9/8/22   Historical Provider, MD   midodrine (PROAMATINE) 10 MG tablet  12/22/22   Historical Provider, MD   midodrine (PROAMATINE) 2 5 mg tablet Take 1 tablet (2 5 mg total) by mouth 2 (two) times a day before meals 12/8/22   Lady Stinson PA-C   NON FORMULARY 1 Bottle if needed Relion Glucose    Historical Provider, MD   polyethylene glycol (GOLYTELY) 4000 mL solution Take 4,000 mL by mouth once for 1 dose 12/29/22 12/29/22  Aniket Copeland MD   potassium chloride (Klor-Con) 10 mEq tablet Take 20 mEq by mouth 2 (two) times a day 10/13/22 10/13/23  Historical Provider, MD   risperiDONE (RisperDAL) 1 mg tablet Take 1 tablet by mouth 2 (two) times a day    Historical Provider, MD   sertraline (ZOLOFT) 100 mg tablet Take 100 mg by mouth daily 3/16/22 3/16/23  Historical Provider, MD     I have reviewed home medications with a medical source (PCP, Pharmacy, other)  Allergies: Allergies   Allergen Reactions   • Imipramine Other (See Comments)   • Lisinopril Other (See Comments)   • Paroxetine Other (See Comments)   • Penicillins Hives   • Rosiglitazone Other (See Comments)   • Troglitazone Other (See Comments)       Social History:  Marital Status: Single   Patient Pre-hospital Living Situation: Home  Patient Pre-hospital Level of Mobility: walks with walker  Patient Pre-hospital Diet Restrictions: diabetic  Substance Use History:   Social History     Substance and Sexual Activity   Alcohol Use Not Currently   • Alcohol/week: 5 0 standard drinks   • Types: 5 Cans of beer per week    Comment: Quit in august     Social History     Tobacco Use   Smoking Status Former   • Packs/day: 0 25   • Years: 30 00   • Pack years: 7 50   • Types: Cigarettes   Smokeless Tobacco Never     Social History     Substance and Sexual Activity   Drug Use Never       Family History:  History reviewed  No pertinent family history      Physical Exam:     Vitals:   Blood Pressure: 137/64 (01/06/23 1200)  Pulse: 68 (01/06/23 1200)  Temperature: 98 7 °F (37 1 °C) (01/06/23 1036)  Temp Source: Oral (01/06/23 1036)  Respirations: 20 (01/06/23 1200)  SpO2: 99 % (01/06/23 1200)    Physical Exam  Vitals reviewed  HENT:      Head: Normocephalic and atraumatic  Mouth/Throat:      Mouth: Mucous membranes are moist    Eyes:      Conjunctiva/sclera: Conjunctivae normal    Cardiovascular:      Rate and Rhythm: Normal rate and regular rhythm  Heart sounds: Normal heart sounds  Pulmonary:      Effort: Pulmonary effort is normal  No respiratory distress  Breath sounds: Normal breath sounds  No wheezing  Abdominal:      General: There is no distension  Palpations: Abdomen is soft  Tenderness: There is no abdominal tenderness  Musculoskeletal:      Right lower leg: No edema  Left lower leg: No edema  Skin:     General: Skin is warm  Neurological:      Mental Status: He is alert  Comments: He could tell me that it is [address] in January 6 but thinks the year is 2003          Additional Data:     Lab Results:  Results from last 7 days   Lab Units 01/06/23  1053   WBC Thousand/uL 8 25   HEMOGLOBIN g/dL 9 4*   HEMATOCRIT % 30 3*   PLATELETS Thousands/uL 204   NEUTROS PCT % 57   LYMPHS PCT % 28   MONOS PCT % 13*   EOS PCT % 1     Results from last 7 days   Lab Units 01/06/23  1053   SODIUM mmol/L 140   POTASSIUM mmol/L 3 7   CHLORIDE mmol/L 112*   CO2 mmol/L 25   BUN mg/dL 18   CREATININE mg/dL 1 26   ANION GAP mmol/L 3*   CALCIUM mg/dL 8 5   ALBUMIN g/dL 3 1*   TOTAL BILIRUBIN mg/dL 0 32   ALK PHOS U/L 76   ALT U/L 13   AST U/L 8   GLUCOSE RANDOM mg/dL 123         Results from last 7 days   Lab Units 01/06/23  1445 01/06/23  1037   POC GLUCOSE mg/dl 176* 132               Lines/Drains:  Invasive Devices     Peripheral Intravenous Line  Duration           Peripheral IV 01/06/23 Dorsal (posterior); Left Hand <1 day    Peripheral IV 01/06/23 Dorsal (posterior); Right Hand <1 day Imaging: No pertinent imaging reviewed  CT head wo contrast    (Results Pending)       EKG and Other Studies Reviewed on Admission:   · EKG: Reviewed, NSR     ** Please Note: This note has been constructed using a voice recognition system   **

## 2023-01-06 NOTE — ASSESSMENT & PLAN NOTE
Lab Results   Component Value Date    HGBA1C 7 5 (H) 07/22/2022       Recent Labs     01/06/23  1037 01/06/23  1445   POCGLU 132 176*       Blood Sugar Average: Last 72 hrs:  (P) 154     · Continue home regimen of Lantus 8 units at bedtime, Humalog 6 units 3 times daily, add ISS  · Follow-up with endocrine

## 2023-01-06 NOTE — DISCHARGE INSTRUCTIONS
Thank you for coming to the ED for your care  Your blood sugar and blood pressures have been reassuring as well as the rest of your workup  We recommend following up with your primary care doctor to discuss possible medication adjustments  It appears you are on medications that both raise and lower your blood pressure and they may want to adjust these  Please return to the ED with any further concerning symptoms

## 2023-01-07 LAB
BASOPHILS # BLD AUTO: 0.02 THOUSANDS/ÂΜL (ref 0–0.1)
BASOPHILS NFR BLD AUTO: 0 % (ref 0–1)
EOSINOPHIL # BLD AUTO: 0.13 THOUSAND/ÂΜL (ref 0–0.61)
EOSINOPHIL NFR BLD AUTO: 2 % (ref 0–6)
ERYTHROCYTE [DISTWIDTH] IN BLOOD BY AUTOMATED COUNT: 15.8 % (ref 11.6–15.1)
GLUCOSE SERPL-MCNC: 104 MG/DL (ref 65–140)
GLUCOSE SERPL-MCNC: 117 MG/DL (ref 65–140)
GLUCOSE SERPL-MCNC: 157 MG/DL (ref 65–140)
GLUCOSE SERPL-MCNC: 253 MG/DL (ref 65–140)
HCT VFR BLD AUTO: 33.6 % (ref 36.5–49.3)
HGB BLD-MCNC: 10.5 G/DL (ref 12–17)
IMM GRANULOCYTES # BLD AUTO: 0.02 THOUSAND/UL (ref 0–0.2)
IMM GRANULOCYTES NFR BLD AUTO: 0 % (ref 0–2)
LYMPHOCYTES # BLD AUTO: 2.24 THOUSANDS/ÂΜL (ref 0.6–4.47)
LYMPHOCYTES NFR BLD AUTO: 26 % (ref 14–44)
MCH RBC QN AUTO: 28.2 PG (ref 26.8–34.3)
MCHC RBC AUTO-ENTMCNC: 31.3 G/DL (ref 31.4–37.4)
MCV RBC AUTO: 90 FL (ref 82–98)
MONOCYTES # BLD AUTO: 1.23 THOUSAND/ÂΜL (ref 0.17–1.22)
MONOCYTES NFR BLD AUTO: 14 % (ref 4–12)
NEUTROPHILS # BLD AUTO: 4.93 THOUSANDS/ÂΜL (ref 1.85–7.62)
NEUTS SEG NFR BLD AUTO: 58 % (ref 43–75)
NRBC BLD AUTO-RTO: 0 /100 WBCS
PLATELET # BLD AUTO: 218 THOUSANDS/UL (ref 149–390)
PMV BLD AUTO: 11.4 FL (ref 8.9–12.7)
RBC # BLD AUTO: 3.73 MILLION/UL (ref 3.88–5.62)
WBC # BLD AUTO: 8.57 THOUSAND/UL (ref 4.31–10.16)

## 2023-01-07 RX ORDER — SODIUM CHLORIDE, SODIUM GLUCONATE, SODIUM ACETATE, POTASSIUM CHLORIDE, MAGNESIUM CHLORIDE, SODIUM PHOSPHATE, DIBASIC, AND POTASSIUM PHOSPHATE .53; .5; .37; .037; .03; .012; .00082 G/100ML; G/100ML; G/100ML; G/100ML; G/100ML; G/100ML; G/100ML
50 INJECTION, SOLUTION INTRAVENOUS CONTINUOUS
Status: DISPENSED | OUTPATIENT
Start: 2023-01-07 | End: 2023-01-09

## 2023-01-07 RX ADMIN — FLUDROCORTISONE ACETATE 0.2 MG: 0.1 TABLET ORAL at 08:12

## 2023-01-07 RX ADMIN — ATORVASTATIN CALCIUM 10 MG: 10 TABLET, FILM COATED ORAL at 08:10

## 2023-01-07 RX ADMIN — POTASSIUM CHLORIDE 20 MEQ: 750 TABLET, EXTENDED RELEASE ORAL at 16:38

## 2023-01-07 RX ADMIN — INSULIN LISPRO 1 UNITS: 100 INJECTION, SOLUTION INTRAVENOUS; SUBCUTANEOUS at 16:39

## 2023-01-07 RX ADMIN — APIXABAN 5 MG: 5 TABLET, FILM COATED ORAL at 16:38

## 2023-01-07 RX ADMIN — MIDODRINE HYDROCHLORIDE 10 MG: 5 TABLET ORAL at 16:38

## 2023-01-07 RX ADMIN — ASPIRIN 81 MG: 81 TABLET, CHEWABLE ORAL at 08:11

## 2023-01-07 RX ADMIN — INSULIN LISPRO 2 UNITS: 100 INJECTION, SOLUTION INTRAVENOUS; SUBCUTANEOUS at 11:39

## 2023-01-07 RX ADMIN — MIDODRINE HYDROCHLORIDE 10 MG: 5 TABLET ORAL at 06:04

## 2023-01-07 RX ADMIN — RISPERIDONE 1 MG: 1 TABLET ORAL at 16:38

## 2023-01-07 RX ADMIN — HYDROCORTISONE 10 MG: 10 TABLET ORAL at 16:40

## 2023-01-07 RX ADMIN — RISPERIDONE 1 MG: 1 TABLET ORAL at 08:10

## 2023-01-07 RX ADMIN — APIXABAN 5 MG: 5 TABLET, FILM COATED ORAL at 08:11

## 2023-01-07 RX ADMIN — SODIUM CHLORIDE, SODIUM GLUCONATE, SODIUM ACETATE, POTASSIUM CHLORIDE, MAGNESIUM CHLORIDE, SODIUM PHOSPHATE, DIBASIC, AND POTASSIUM PHOSPHATE 50 ML/HR: .53; .5; .37; .037; .03; .012; .00082 INJECTION, SOLUTION INTRAVENOUS at 16:17

## 2023-01-07 RX ADMIN — CALCITRIOL CAPSULES 0.25 MCG 0.25 MCG: 0.25 CAPSULE ORAL at 08:11

## 2023-01-07 RX ADMIN — HYDROCORTISONE 15 MG: 10 TABLET ORAL at 08:11

## 2023-01-07 RX ADMIN — SERTRALINE 100 MG: 100 TABLET, FILM COATED ORAL at 08:10

## 2023-01-07 RX ADMIN — Medication 12.5 MG: at 08:11

## 2023-01-07 RX ADMIN — INSULIN LISPRO 6 UNITS: 100 INJECTION, SOLUTION INTRAVENOUS; SUBCUTANEOUS at 08:12

## 2023-01-07 RX ADMIN — VITAM B12 100 MCG: 100 TAB at 08:11

## 2023-01-07 RX ADMIN — POTASSIUM CHLORIDE 20 MEQ: 750 TABLET, EXTENDED RELEASE ORAL at 08:10

## 2023-01-07 RX ADMIN — INSULIN LISPRO 6 UNITS: 100 INJECTION, SOLUTION INTRAVENOUS; SUBCUTANEOUS at 11:39

## 2023-01-07 RX ADMIN — MIDODRINE HYDROCHLORIDE 10 MG: 5 TABLET ORAL at 11:41

## 2023-01-07 RX ADMIN — INSULIN GLARGINE 18 UNITS: 100 INJECTION, SOLUTION SUBCUTANEOUS at 21:19

## 2023-01-07 RX ADMIN — INSULIN LISPRO 6 UNITS: 100 INJECTION, SOLUTION INTRAVENOUS; SUBCUTANEOUS at 16:39

## 2023-01-07 NOTE — CONSULTS
Consultation - General Cardiology Team [address] 74 y o  male MRN: [id number]  Unit/Bed#: [facility] 831-01 Encounter: [id number]            Inpatient consult to Cardiology     Performed by  Shai Mercado MD     Authorized by Myles Copeland MD            PCP: Helena Motley MD   Outpatient Cardiologist: Ricco Cline MD  History of Present Illness   Physician Requesting Consult: Myles Copeland MD  Reason for Consult / Principal Problem: Orthostatic hypotension     HPI: Nikole Stokes is a 74y o  year old male who presented with 2-3 episodes of syncope/presyncope  He has history of paroxysmal A  fib (on Eliquis), history of CVA, orthostatic hypotension, adrenal insufficiency, and diabetes  As per patient's daughter, patient tends to get dizzy and lightheaded when he stands up and on the day of presentation he had 2-3 episodes where when he would stand up he would get extremely dizzy, appeared pale and needed help from the daughter to prevent a fall  During one of the aforementioned episodes his blood pressure was 66/42, he also became disoriented and did not look good  He had a similar admission in December and he was discharged on 2 5 mg of midodrine twice daily along with stockings, binder and Florinef  Towards the end of the month, during a PCP visit, midodrine was increased to 10 mg 3 times daily  Review of Systems  Review of system was conducted and was negative except for as stated in the HPI  Historical Information   Past Medical History:   Diagnosis Date   • Diabetes mellitus ([facility] [address] )    • Obesity, morbid ([facility] [address] ) 11/14/2022     History reviewed  No pertinent surgical history    Social History     Substance and Sexual Activity   Alcohol Use Not Currently   • Alcohol/week: 5 0 standard drinks   • Types: 5 Cans of beer per week    Comment: Quit in august     Social History     Substance and Sexual Activity   Drug Use Never     Social History     Tobacco Use   Smoking Status Former   • Packs/day: 0 25   • Years: 30 00   • Pack years: 7 50   • Types: Cigarettes   Smokeless Tobacco Never     Family History: non-contributory    Meds/Allergies   Hospital Medications:   Current Facility-Administered Medications   Medication Dose Route Frequency   • apixaban (ELIQUIS) tablet 5 mg  5 mg Oral BID   • aspirin chewable tablet 81 mg  81 mg Oral Daily   • atorvastatin (LIPITOR) tablet 10 mg  10 mg Oral Daily   • calcitriol (ROCALTROL) capsule 0 25 mcg  0 25 mcg Oral Daily   • cyanocobalamin (VITAMIN B-12) tablet 100 mcg  100 mcg Oral Daily   • fludrocortisone (FLORINEF) tablet 0 2 mg  0 2 mg Oral Daily   • hydrocortisone (CORTEF) tablet 10 mg  10 mg Oral QPM   • hydrocortisone (CORTEF) tablet 15 mg  15 mg Oral Daily   • insulin glargine (LANTUS) subcutaneous injection 18 Units 0 18 mL  18 Units Subcutaneous HS   • insulin lispro (HumaLOG) 100 units/mL subcutaneous injection 1-5 Units  1-5 Units Subcutaneous TID AC   • insulin lispro (HumaLOG) 100 units/mL subcutaneous injection 1-5 Units  1-5 Units Subcutaneous HS   • insulin lispro (HumaLOG) 100 units/mL subcutaneous injection 6 Units  6 Units Subcutaneous TID With Meals   • midodrine (PROAMATINE) tablet 10 mg  10 mg Oral TID AC   • multi-electrolyte (PLASMALYTE-A/ISOLYTE-S PH 7 4) IV solution  50 mL/hr Intravenous Continuous   • potassium chloride (K-DUR,KLOR-CON) CR tablet 20 mEq  20 mEq Oral BID   • risperiDONE (RisperDAL) tablet 1 mg  1 mg Oral BID   • sertraline (ZOLOFT) tablet 100 mg  100 mg Oral Daily     Home Medications:   Medications Prior to Admission   Medication   • apixaban (ELIQUIS) 5 mg   • aspirin 81 mg chewable tablet   • atorvastatin (LIPITOR) 10 mg tablet   • bisacodyl (DULCOLAX) 5 mg EC tablet   • calcitriol (ROCALTROL) 0 25 mcg capsule   • cyanocobalamin (VITAMIN B-12) 100 mcg tablet   • fludrocortisone (FLORINEF) 0 1 mg tablet   • hydrocortisone (CORTEF) 5 mg tablet   • insulin glargine (Toujeo SoloStar) 300 units/mL CONCENTRATED U-300 injection pen (1-unit dial)   • insulin lispro (HumaLOG KwikPen) 100 units/mL injection pen   • Insulin Pen Needle (BD Pen Needle Blessing 2nd Gen) 32G X 4 MM MISC   • Insulin Pen Needle (BD Pen Needle Blessing 2nd Gen) 32G X 4 MM MISC   • metoprolol tartrate (LOPRESSOR) 25 mg tablet   • midodrine (PROAMATINE) 10 MG tablet   • midodrine (PROAMATINE) 2 5 mg tablet   • NON FORMULARY   • polyethylene glycol (GOLYTELY) 4000 mL solution   • potassium chloride (Klor-Con) 10 mEq tablet   • risperiDONE (RisperDAL) 1 mg tablet   • sertraline (ZOLOFT) 100 mg tablet       Allergies   Allergen Reactions   • Imipramine Other (See Comments)   • Lisinopril Other (See Comments)   • Paroxetine Other (See Comments)   • Penicillins Hives   • Rosiglitazone Other (See Comments)   • Troglitazone Other (See Comments)       Objective   Vitals: Blood pressure 113/55, pulse 67, temperature 97 5 °F (36 4 °C), resp  rate 19, height 5' 4" (1 626 m), weight 84 kg (185 lb 3 oz), SpO2 98 %  Orthostatic Blood Pressures    Flowsheet Row Most Recent Value   Blood Pressure 113/55 filed at 01/07/2023 1507   Patient Position - Orthostatic VS Standing - Orthostatic VS filed at 01/07/2023 0602            Invasive Devices     Peripheral Intravenous Line  Duration           Peripheral IV 01/06/23 Dorsal (posterior); Left Hand 1 day    Peripheral IV 01/06/23 Dorsal (posterior); Right Hand 1 day                Physical Exam    GEN: [address] [facility] appears well, alert and oriented x 2 (self and location; gave incorrect date) pleasant and cooperative  Appeared pale     HEENT:  Normocephalic, atraumatic, anicteric, moist mucous membranes  NECK: No JVD or carotid bruits   HEART: Regular rhythm, normal rate, normal S1 and S2, no murmurs, clicks, gallops or rubs   LUNGS: Clear to auscultation bilaterally; no wheezes, rales, or rhonchi; respiration nonlabored   ABDOMEN:  Normoactive bowel sounds, soft, no tenderness, no distention  EXTREMITIES: peripheral pulses palpable; no edema      Lab Results: I have personally reviewed pertinent lab results  Results from last 7 days   Lab Units 01/06/23  1916 01/06/23  1704 01/06/23  1448   HS TNI 0HR ng/L  --   --  11   HS TNI 2HR ng/L  --  10  --    HS TNI 4HR ng/L 11  --   --          Results from last 7 days   Lab Units 01/06/23  1053   POTASSIUM mmol/L 3 7   CO2 mmol/L 25   CHLORIDE mmol/L 112*   BUN mg/dL 18   CREATININE mg/dL 1 26     Results from last 7 days   Lab Units 01/07/23  0737 01/06/23  1053   HEMOGLOBIN g/dL 10 5* 9 4*   HEMATOCRIT % 33 6* 30 3*   PLATELETS Thousands/uL 218 204             Imaging: I have personally reviewed pertinent reports  EKG:   Date:1/6/2023  Interpretation: Normal sinus rhythm         Assessment/Plan     Assessment:    1  Orthostatic hypotension/autonomic insufficiency -   - Known history of, previous admission to [facility] and [facility] with similar complaints   - Currently on Midodrine 10mg TID, Florinef 2 0mg  - At home he had 2-3 episodes of syncope/presyncope, confusion and blood pressure in 60s/40s    - Liable BP during the hospital stay  - Metoprolol tartrate 12 5mg BID was resumed on current admission   - No ischemic changes on ECG and trops were negative   - CTH: no acute intracranial abnormalities  2  Paroxysmal afib- on Eliquis and Lopressor PRN at home   3  Unintentional weight loss- pending Colonoscopy and further cancer screening   4  DM1- Continue with home regimen   5  Adrenal insufficiency C/w Florinef 0 2mg, hydrocortisone 50mg QAM and 10mg QPM     Plan:  1  C/w Midodrine 10mg TID; stockings, abdominal binder  2  Fluid replacement   3  D/c metoprolol   4  Patient is on Risperidone which can also contribute to symptoms  5  Further evaluation of possible neoplasm; paraneoplastic syndrome? Case discussed and reviewed with Dr Madison Cheng who agrees with my assessment and plan  Thank you for involving us in the care of your patient        Duncan Liang MD  Cardiology Fellow   PGY-4

## 2023-01-07 NOTE — PROGRESS NOTES
Reny Stevens 1481 1955, 74 y o  male MRN: [id number]  Unit/Bed#: [facility] 831-01 Encounter: [id number]  Primary Care Provider: Elise Anton MD   Date and time admitted to hospital: 1/6/2023 10:30 AM    * Syncope  Assessment & Plan  · Syncope/near syncope x2-3  · Also had another similar episode this morning while working with physical therapy which was likely related to orthostatic hypotension as well and his orthostatic vitals were positive for hypotension     · Most likely suspect secondary to orthostatic hypotension only  · EKG - NSR  Troponin negative  · Ct head: No acute intracranial CT abnormality  Stable chronic bilateral gangliocapsular infarcts  Chronic microangiopathy  Right maxillary sinus mucosal thickening containing hyperattenuating foci may indicate inspissated secretions versus fungal colonization  · Lost 4 lb in few days - ? If that contributing  He is scheduled to get EGD and colonoscopy end of this month    PLAN:  · Start gentle IV hydration  will Switch to inpatient when recurrent syncopal episodes and for further monitoring  · Obtain echocardiogram  · Cardiology consult  · Hold metoprolol for now  · Continue current dose of Florinef and midodrine  · PT OT evaluation>> recommend rehab    Orthostatic hypotension  Assessment & Plan  · History of such  · Current home regimen -Florinef 0 2 daily, hydrocortisone 50 mg in a m  and 10 mg in p m , midodrine 10 mg 3 times daily    Was increased from twice daily to 3 times daily about 2 weeks ago  · Seems like despite this regimen he was having orthostatic hypotension and near syncope  · Has had lost 4 pounds in a few days  · For now we will continue the bowel regimen, apply stockings, abdominal binder and check orthostatics qshift  · Cardiology consult  · IVF    Psychiatric disorder  Assessment & Plan  · Continue Risperidone and Zoloft at home dose    Anemia of chronic disease  Assessment & Plan  · Baseline seems to be between 8-10  · currently at baseline      History of stroke  Assessment & Plan  · Aspirin, statin, Eliquis    Paroxysmal atrial fibrillation   Assessment & Plan  · Hold metoprolol due to severe orthostatic hypotension  · Continue Eliquis    Adrenal insufficiency   Assessment & Plan  · Labs with endocrine outpatient  · Continue Florinef 0 2 mg daily, hydrocortisone 50 mg in a m  and 10 mg in evening    Insulin dependent type 1 diabetes mellitus   Assessment & Plan  Lab Results   Component Value Date    HGBA1C 7 5 (H) 07/22/2022       Recent Labs     01/06/23  2201 01/06/23  2239 01/07/23  0536 01/07/23  1037   POCGLU 66 88 117 253*       Blood Sugar Average: Last 72 hrs:  (P) 137     · Continue home regimen of Lantus 18 units at bedtime, Humalog 6 units 3 times daily, add ISS  · Follow-up with endocrine OP  · Hypoglycemia protocol        VTE Pharmacologic Prophylaxis:   Pharmacologic: Apixaban (Eliquis)  Mechanical VTE Prophylaxis in Place: Yes    Patient Centered Rounds: I have performed bedside rounds with nursing staff today  Discussions with Specialists or Other Care Team Provider: Cardiology     Education and Discussions with Family / Patient: Plan of care discussed with patient  He respectfully declined my offer to call his family for updates  Time Spent for Care: 30 minutes  More than 50% of total time spent on counseling and coordination of care as described above      Current Length of Stay: 0 day(s)    Current Patient Status: Inpatient   Certification Statement: The patient will continue to require additional inpatient hospital stay due to Medically stable due to need for IV fluids and further monitoring given syncopal episodes    Discharge Plan: PT recommends rehab when medically stable    Code Status: Level 1 - Full Code      Subjective:   Again had near syncopal event at this morning with orthostatic hypotension while working with physical therapy  During my assessment of him, he was laying in the bed and did not have any acute pain or discomfort  No chest pain, dizziness, shortness of breath while sitting up/laying in the bed  Objective:     Vitals:   Temp (24hrs), Av 5 °F (36 4 °C), Min:97 °F (36 1 °C), Max:97 9 °F (36 6 °C)    Temp:  [97 °F (36 1 °C)-97 9 °F (36 6 °C)] 97 °F (36 1 °C)  HR:  [63-84] 66  Resp:  [16-20] 16  BP: ()/(44-97) 94/50  SpO2:  [97 %-100 %] 99 %  Body mass index is 31 79 kg/m²  Input and Output Summary (last 24 hours): Intake/Output Summary (Last 24 hours) at 2023 1351  Last data filed at 2023 0900  Gross per 24 hour   Intake 180 ml   Output 400 ml   Net -220 ml       Physical Exam:     Physical Exam  Constitutional:       General: He is not in acute distress  Cardiovascular:      Rate and Rhythm: Normal rate and regular rhythm  Heart sounds: Normal heart sounds  No murmur heard  Pulmonary:      Effort: No respiratory distress  Breath sounds: Normal breath sounds  No wheezing or rales  Abdominal:      General: Bowel sounds are normal  There is no distension  Palpations: Abdomen is soft  Tenderness: There is no abdominal tenderness  Skin:     General: Skin is warm  Neurological:      Mental Status: He is alert        Comments: Awake alert and communicative  Able to move b/l a UE and planter flex feet b/l without significant weakness           Additional Data:     Labs:    Results from last 7 days   Lab Units 23  0737   WBC Thousand/uL 8 57   HEMOGLOBIN g/dL 10 5*   HEMATOCRIT % 33 6*   PLATELETS Thousands/uL 218   NEUTROS PCT % 58   LYMPHS PCT % 26   MONOS PCT % 14*   EOS PCT % 2     Results from last 7 days   Lab Units 23  1053   SODIUM mmol/L 140   POTASSIUM mmol/L 3 7   CHLORIDE mmol/L 112*   CO2 mmol/L 25   BUN mg/dL 18   CREATININE mg/dL 1 26   ANION GAP mmol/L 3*   CALCIUM mg/dL 8 5   ALBUMIN g/dL 3 1*   TOTAL BILIRUBIN mg/dL 0 32   ALK PHOS U/L 76 ALT U/L 13   AST U/L 8   GLUCOSE RANDOM mg/dL 123         Results from last 7 days   Lab Units 01/07/23  1037 01/07/23  0536 01/06/23  2239 01/06/23  2201 01/06/23 2018 01/06/23  1754 01/06/23  1445 01/06/23  1037   POC GLUCOSE mg/dl 253* 117 88 66 110 154* 176* 132                   * I Have Reviewed All Lab Data Listed Above  * Additional Pertinent Lab Tests Reviewed: All Labs Within Last 24 Hours Reviewed    Imaging:    CT head wo contrast   Final Result by Emmanuel Diaz MD (01/06 1534)      1  No acute intracranial CT abnormality  2   Stable chronic bilateral gangliocapsular infarcts  Chronic microangiopathy  3   Right maxillary sinus mucosal thickening containing hyperattenuating foci may indicate inspissated secretions versus fungal colonization                    Workstation performed: [id number]               Recent Cultures (last 7 days):           Last 24 Hours Medication List:   Current Facility-Administered Medications   Medication Dose Route Frequency Provider Last Rate   • apixaban  5 mg Oral BID Jeanne Simpson MD     • aspirin  81 mg Oral Daily Jeanne Simpson MD     • atorvastatin  10 mg Oral Daily Jeanne Simpson MD     • calcitriol  0 25 mcg Oral Daily Jeanne Simpson MD     • cyanocobalamin  100 mcg Oral Daily Jeanne Simpson MD     • fludrocortisone  0 2 mg Oral Daily Jeanne Simpson MD     • hydrocortisone  10 mg Oral QPM Jeanne Simpson MD     • hydrocortisone  15 mg Oral Daily Jeanne Simpson MD     • insulin glargine  18 Units Subcutaneous HS Jeanne Simpson MD     • insulin lispro  1-5 Units Subcutaneous TID AC Jeanne Simpson MD     • insulin lispro  1-5 Units Subcutaneous  Jeanne Simpson MD     • insulin lispro  6 Units Subcutaneous TID With Meals Sue Pedro MD     • midodrine  10 mg Oral TID AC Jeanne Simpson MD     • multi-electrolyte  50 mL/hr Intravenous Pantera Archer MD     • potassium chloride  20 mEq Oral BID Chepe Downs MD     • risperiDONE  1 mg Oral BID Chepe Downs MD     • sertraline  100 mg Oral Daily Chepe Downs MD          Today, Patient Was Seen By: Vicki Forbes MD    ** Please Note: Dictation voice to text software may have been used in the creation of this document   ** Statement Selected

## 2023-01-07 NOTE — PROGRESS NOTES
notified by physical therapy, orthostatic bp standing 74/51 via cherelle; pt lying in bed upon assessment, bp taken manually with results of 94/50

## 2023-01-07 NOTE — PLAN OF CARE
Problem: OCCUPATIONAL THERAPY ADULT  Goal: Performs self-care activities at highest level of function for planned discharge setting  See evaluation for individualized goals  Description: Treatment Interventions: ADL retraining, Functional transfer training, UE strengthening/ROM, Endurance training, Cognitive reorientation, Patient/family training, Equipment evaluation/education, Neuromuscular reeducation, Compensatory technique education, Continued evaluation, Energy conservation, Activityengagement          See flowsheet documentation for full assessment, interventions and recommendations  Note: Limitation: Decreased ADL status, Decreased UE strength, Decreased endurance, Decreased Safe judgement during ADL, Decreased cognition, Decreased fine motor control, Decreased self-care trans, Decreased high-level ADLs  Prognosis: Fair  Assessment: Pt is a 74year old male seen for initial OT evaluation s/p admission to [facility] with few episodes of syncope and near syncope likely 2* orthostatic hypotension  CT head (-) for acute abnormalities  Additional active problems include: psych disorder, anemia, h/o CVA with residual cognitive deficits, atrial fibrillation, adrenal insufficiency, DM  Pt is a questionable historian due to cognitive deficits  Pt reports he resides with a supportive friend  Per chart, he resides with wife who has dementia and daughter provides 3 checks/day  Pt provides inconsistent PLOF  Per chart, pt was I with ADLs, and was primarily responsible for IADLs 2* wife's cognitive deficits  Pt typically ambulates with use of spc  As of last admission, pt was driving, and managing medications I'ly (but poorly)  Pt is currently demonstrating the following occupational deficits: UB ADLs with [place], LB ADLs with modA, bed mobility with supervision, functional transfers with [place]    Factors currently limiting pt's independence in these areas include: orthostatic hypotension, ambulatory dysfunction, cognitive deficits, balance, endurance, activity tolerance, and unsupportive home environment  From an OT standpoint, recommend STR upon d/c  Pt is to continue to benefit from skilled occupational therapy services while in the hospital to maximize functioning and independence with daily activities  See below for OT goals to be addressed 2-4x/wk       OT Discharge Recommendation: Post acute rehabilitation services

## 2023-01-07 NOTE — ASSESSMENT & PLAN NOTE
Lab Results   Component Value Date    HGBA1C 7 5 (H) 07/22/2022       Recent Labs     01/06/23  2201 01/06/23  2239 01/07/23  0536 01/07/23  1037   POCGLU 66 88 117 253*       Blood Sugar Average: Last 72 hrs:  (P) 137     · Continue home regimen of Lantus 18 units at bedtime, Humalog 6 units 3 times daily, add ISS  · Follow-up with endocrine OP  · Hypoglycemia protocol

## 2023-01-07 NOTE — ASSESSMENT & PLAN NOTE
· History of such  · Current home regimen -Florinef 0 2 daily, hydrocortisone 50 mg in a m  and 10 mg in p m , midodrine 10 mg 3 times daily    Was increased from twice daily to 3 times daily about 2 weeks ago  · Seems like despite this regimen he was having orthostatic hypotension and near syncope  · Has had lost 4 pounds in a few days  · For now we will continue the bowel regimen, apply stockings, abdominal binder and check orthostatics qshift  · Cardiology consult  · IVF

## 2023-01-07 NOTE — ASSESSMENT & PLAN NOTE
· Syncope/near syncope x2-3  · Also had another similar episode this morning while working with physical therapy which was likely related to orthostatic hypotension as well and his orthostatic vitals were positive for hypotension     · Most likely suspect secondary to orthostatic hypotension only  · EKG - NSR  Troponin negative  · Ct head: No acute intracranial CT abnormality  Stable chronic bilateral gangliocapsular infarcts  Chronic microangiopathy  Right maxillary sinus mucosal thickening containing hyperattenuating foci may indicate inspissated secretions versus fungal colonization  · Lost 4 lb in few days - ? If that contributing  He is scheduled to get EGD and colonoscopy end of this month    PLAN:  · Start gentle IV hydration  will Switch to inpatient when recurrent syncopal episodes and for further monitoring    · Obtain echocardiogram  · Cardiology consult  · Hold metoprolol for now  · Continue current dose of Florinef and midodrine  · PT OT evaluation>> recommend rehab

## 2023-01-07 NOTE — OCCUPATIONAL THERAPY NOTE
Occupational Therapy Evaluation     Patient Name: Doni CASTLE Date: 1/7/2023  Problem List  Principal Problem:    Syncope  Active Problems:    Insulin dependent type 1 diabetes mellitus     Adrenal insufficiency     Paroxysmal atrial fibrillation     History of stroke    Anemia of chronic disease    Psychiatric disorder    Orthostatic hypotension    Past Medical History  Past Medical History:   Diagnosis Date    Diabetes mellitus ([facility] [address] )     Obesity, morbid ([facility] [address] ) 11/14/2022     Past Surgical History  History reviewed  No pertinent surgical history  01/07/23 0952   OT Last Visit   OT Visit Date 01/07/23   Note Type   Note type Evaluation   Pain Assessment   Pain Assessment Tool 0-10   Pain Score No Pain   Restrictions/Precautions   Weight Bearing Precautions Per Order No   Other Precautions Cognitive; Bed Alarm;Multiple lines;Telemetry; Fall Risk   Home Living   Type of Home [facility] with 5STE)   Home Layout Two level   Bathroom Shower/Tub Walk-in shower   [address] [place] [address] Dr ye   [address] [facility]   Additional Comments Per chart, resides with his wife, who has dementia  Dtr provides 3-4 checks/day  Prior Function   Level of [place] Independent with ADLs; Independent with functional mobility  (per pt)   Lives With Significant other   Receives Help From Family   IADLs   (as of last admission, pt driving and managing meds poorly)   Falls in the last 6 months >10   Vocational Retired   Lifestyle   Autonomy Pt provides inconsistent PLOF  Per chart, pt was I with ADLs, and was primarily responsible for IADLs 2* wife's cognitive deficits  Pt typically ambulates with use of spc  As of last admission, pt was driving, and managing medications I'ly (but poorly)     Reciprocal Relationships wife with dementia, daughter   Service to Others retired   ADL   Eating Assistance 5  [address] Minimal Assistance   LB Bathing Assistance 3  Moderate Assistance   UB Dressing Assistance 4  Minimal Assistance   LB Dressing Assistance 3  Moderate Assistance   Toileting Assistance  4  Minimal Assistance   Bed Mobility   Supine to Sit 5  Supervision   Sit to Supine 5  Supervision   Transfers   Sit to Stand 4  Minimal assistance   Additional items Assist x 1   Stand to Sit 4  Minimal assistance   Additional items Assist x 1   Additional Comments BP supine 118/58  Decreases to 73/52 sitting with initial reports of lightheadedness that resolves ~2 min  Only maintains stance ~20 seconds, so unable to take standing BP 2* lightheadedness  Upon return to supine, 74/51  RN aware   Balance   Static Sitting Fair -   Dynamic Sitting Poor +   Static Standing Poor   Ambulatory Zero   Activity Tolerance   Activity Tolerance   (lighted by hypotension)   Medical Staff Made Aware PT [address] Pt cleared by RN for OT evaluation and OOB mobility  RN updated following session   RUE Assessment   RUE Assessment   (4-/5 grossly)   LUE Assessment   LUE Assessment   (4-/5 grossly)   Hand Function   Gross Motor Coordination Functional   Vision - Complex Assessment   Ocular Range of Motion Intact   Tracking Intact   Psychosocial   Psychosocial (WDL) X   Patient Behaviors/Mood Flat affect   Cognition   Overall Cognitive Status Impaired   Arousal/Participation Alert; Responsive   Attention Attends with cues to redirect   Orientation Level Oriented X4   Memory Decreased recall of recent events;Decreased recall of precautions;Decreased short term memory;Decreased recall of biographical information;Decreased long term memory   Following Commands Follows one step commands with increased time or repetition   Comments Pt presents with flat affect, requires significantly inc time for processing basic info and repetition of directions  History provided by pt is inconsistent and appears mostly inaccurate    Per chart, h/o memory deficits s/p CVA  Concern for patient's safety in home environment 2* cognitive deficits and his role as caregiver to his wife who has dementia  Would benefit from cognitive assessment, but strongly recommend pt be cleared by fitness to drive assessment prior to returning to driving and recommend assist with med mgmt   Assessment   Limitation Decreased ADL status; Decreased UE strength;Decreased endurance;Decreased Safe judgement during ADL;Decreased cognition;Decreased fine motor control;Decreased self-care trans;Decreased high-level ADLs   Prognosis Fair   Assessment Pt is a 74year old male seen for initial OT evaluation s/p admission to [facility] with few episodes of syncope and near syncope likely 2* orthostatic hypotension  CT head (-) for acute abnormalities  Additional active problems include: psych disorder, anemia, h/o CVA with residual cognitive deficits, atrial fibrillation, adrenal insufficiency, DM  Pt is a questionable historian due to cognitive deficits  Pt reports he resides with a supportive friend  Per chart, he resides with wife who has dementia and daughter provides 3 checks/day  Pt provides inconsistent PLOF  Per chart, pt was I with ADLs, and was primarily responsible for IADLs 2* wife's cognitive deficits  Pt typically ambulates with use of spc  As of last admission, pt was driving, and managing medications I'ly (but poorly)  Pt is currently demonstrating the following occupational deficits: UB ADLs with [place], LB ADLs with modA, bed mobility with supervision, functional transfers with [place]  Factors currently limiting pt's independence in these areas include: orthostatic hypotension, ambulatory dysfunction, cognitive deficits, balance, endurance, activity tolerance, and unsupportive home environment  From an OT standpoint, recommend STR upon d/c    Pt is to continue to benefit from skilled occupational therapy services while in the hospital to maximize functioning and independence with daily activities  See below for OT goals to be addressed 2-4x/wk  Goals   Patient Goals to go home   Plan   Treatment Interventions ADL retraining;Functional transfer training;UE strengthening/ROM; Endurance training;Cognitive reorientation;Patient/family training;Equipment evaluation/education; Neuromuscular reeducation; Compensatory technique education;Continued evaluation; Energy conservation; Activityengagement   Goal Expiration Date 01/17/23   OT Treatment Day 1   OT Frequency   (2-4x/wk)   Recommendation   OT Discharge Recommendation Post acute rehabilitation services   AM-PAC Daily Activity Inpatient   Lower Body Dressing 2   Bathing 2   Toileting 3   Upper Body Dressing 3   Grooming 3   Eating 4   Daily Activity Raw Score 17   Daily Activity Standardized Score (Calc for Raw Score >=11) 37 26   AM-PAC Applied Cognition Inpatient   Following a Speech/Presentation 1   Understanding Ordinary Conversation 2   Taking Medications 1   Remembering Where Things Are Placed or Put Away 1   Remembering List of 4-5 Errands 1   Taking Care of Complicated Tasks 1   Applied Cognition Raw Score 7   Applied Cognition Standardized Score 15 17     Goals:    Pt will be attentive 100% of the time during ongoing cognitive assessment w/ G participation to assist w/ safe d/c planning/recommendations  Pt will attend to a functional activity for at least 10 minutes with no more than 2 cues for attention/redirection  Pt will improve activity tolerance to G for min 30 min txment sessions for increase engagement in functional tasks  Pt will complete all  self care tasks w/ Wanda w/ use of DME/AD as appropriate      Pt will improve functional transfers to Mod I on/off all surfaces using DME as needed w/ G balance/safety     Pt will improve functional mobility during ADL/IADL/leisure tasks to Mod I using DME as needed w/ G balance/safety     Pt will participate in simulated IADL management task to increase independence to supervision w/ G safety and endurance    Pt will maintain standing upright with distant supervision for at least 10 minutes while completing a dynamic functional activity with good balance and endurance    Yaz Canela, MOT, OTR/L, CSRS

## 2023-01-07 NOTE — PLAN OF CARE
Problem: PAIN - ADULT  Goal: Verbalizes/displays adequate comfort level or baseline comfort level  Description: Interventions:  - Encourage patient to monitor pain and request assistance  - Assess pain using appropriate pain scale  - Administer analgesics based on type and severity of pain and evaluate response  - Implement non-pharmacological measures as appropriate and evaluate response  - Consider cultural and social influences on pain and pain management  - Notify physician/advanced practitioner if interventions unsuccessful or patient reports new pain  Outcome: Progressing     Problem: INFECTION - ADULT  Goal: Absence or prevention of progression during hospitalization  Description: INTERVENTIONS:  - Assess and monitor for signs and symptoms of infection  - Monitor lab/diagnostic results  - Monitor all insertion sites, i e  indwelling lines, tubes, and drains  - Monitor endotracheal if appropriate and nasal secretions for changes in amount and color  - [place] appropriate cooling/warming therapies per order  - Administer medications as ordered  - Instruct and encourage patient and family to use good hand hygiene technique  - Identify and instruct in appropriate isolation precautions for identified infection/condition  Outcome: Progressing  Goal: Absence of fever/infection during neutropenic period  Description: INTERVENTIONS:  - Monitor WBC    Outcome: Progressing     Problem: DISCHARGE PLANNING  Goal: Discharge to home or other facility with appropriate resources  Description: INTERVENTIONS:  - Identify barriers to discharge w/patient and caregiver  - Arrange for needed discharge resources and transportation as appropriate  - Identify discharge learning needs (meds, wound care, etc )  - Arrange for interpretive services to assist at discharge as needed  - Refer to Case Management Department for coordinating discharge planning if the patient needs post-hospital services based on physician/advanced practitioner order or complex needs related to functional status, cognitive ability, or social support system  Outcome: Progressing     Problem: Knowledge Deficit  Goal: Patient/family/caregiver demonstrates understanding of disease process, treatment plan, medications, and discharge instructions  Description: Complete learning assessment and assess knowledge base    Interventions:  - Provide teaching at level of understanding  - Provide teaching via preferred learning methods  Outcome: Progressing     Problem: CARDIOVASCULAR - ADULT  Goal: Maintains optimal cardiac output and hemodynamic stability  Description: INTERVENTIONS:  - Monitor I/O, vital signs and rhythm  - Monitor for S/S and trends of decreased cardiac output  - Administer and titrate ordered vasoactive medications to optimize hemodynamic stability  - Assess quality of pulses, skin color and temperature  - Assess for signs of decreased coronary artery perfusion  - Instruct patient to report change in severity of symptoms  Outcome: Progressing  Goal: Absence of cardiac dysrhythmias or at baseline rhythm  Description: INTERVENTIONS:  - Continuous cardiac monitoring, vital signs, obtain 12 lead EKG if ordered  - Administer antiarrhythmic and heart rate control medications as ordered  - Monitor electrolytes and administer replacement therapy as ordered  Outcome: Progressing     Problem: GASTROINTESTINAL - ADULT  Goal: Minimal or absence of nausea and/or vomiting  Description: INTERVENTIONS:  - Administer IV fluids if ordered to ensure adequate hydration  - Maintain NPO status until nausea and vomiting are resolved  - Nasogastric tube if ordered  - Administer ordered antiemetic medications as needed  - Provide nonpharmacologic comfort measures as appropriate  - Advance diet as tolerated, if ordered  - Consider nutrition services referral to assist patient with adequate nutrition and appropriate food choices  Outcome: Progressing  Goal: Maintains or returns to baseline bowel function  Description: INTERVENTIONS:  - Assess bowel function  - Encourage oral fluids to ensure adequate hydration  - Administer IV fluids if ordered to ensure adequate hydration  - Administer ordered medications as needed  - Encourage mobilization and activity  - Consider nutritional services referral to assist patient with adequate nutrition and appropriate food choices  Outcome: Progressing  Goal: Maintains adequate nutritional intake  Description: INTERVENTIONS:  - Monitor percentage of each meal consumed  - Identify factors contributing to decreased intake, treat as appropriate  - Assist with meals as needed  - Monitor I&O, weight, and lab values if indicated  - Obtain nutrition services referral as needed  Outcome: Progressing     Problem: METABOLIC, FLUID AND ELECTROLYTES - ADULT  Goal: Electrolytes maintained within normal limits  Description: INTERVENTIONS:  - Monitor labs and assess patient for signs and symptoms of electrolyte imbalances  - Administer electrolyte replacement as ordered  - Monitor response to electrolyte replacements, including repeat lab results as appropriate  - Instruct patient on fluid and nutrition as appropriate  Outcome: Progressing  Goal: Glucose maintained within target range  Description: INTERVENTIONS:  - Monitor Blood Glucose as ordered  - Assess for signs and symptoms of hyperglycemia and hypoglycemia  - Administer ordered medications to maintain glucose within target range  - Assess nutritional intake and initiate nutrition service referral as needed  Outcome: Progressing

## 2023-01-07 NOTE — UTILIZATION REVIEW
Initial Clinical Review    OBSERVATION WRITTEN 1/6/23 @ 1337 CONVERTED TO INPATIENT ADMISSION 1/7/23 @ 1221 DUE TO FURTHER DIAGNOSTIC WORKUP REQUIRED FOR SYNCOPE, REQUIRING AT LEAST A 2 MIDNIGHT STAY  Admission: Date/Time/Statement:   Admission Orders (From admission, onward)     Ordered        01/07/23 1221  Inpatient Admission  Once            01/06/23 1337  Place in Observation  Once                      Orders Placed This Encounter   Procedures   • Inpatient Admission     Standing Status:   Standing     Number of Occurrences:   1     Order Specific Question:   Level of Care     Answer:   Med Surg [16]     Order Specific Question:   Estimated length of stay     Answer:   More than 2 Midnights     Order Specific Question:   Certification     Answer:   I certify that inpatient services are medically necessary for this patient for a duration of greater than two midnights  See H&P and MD Progress Notes for additional information about the patient's course of treatment  ED Arrival Information     Expected   -    Arrival   1/6/2023 10:30    Acuity   Emergent            Means of arrival   Ambulance    Escorted by   [facility]    Service   Hospitalist    Admission type   Emergency            Arrival complaint   low blood pressure           Chief Complaint   Patient presents with   • Altered Mental Status     EMS was called for pt being hypoglycemic  BG for EMS was 200  EMS reports pt BP being in 70s upon arrival       Initial Presentation: 74 y o  male  who presented by EMS to [facility] ED  Admitted to med surg telemetry Inpatient status for Syncope  PMHx: AFib, CVA, orthostatic hypotension, adrenal insufficiency, diabetes  Presented w/ 2-3 episodes of syncope/near syncope, low BP at home per daughter 66/42 and confusion  Daughter concerned with pt's ongoing orthostatic hypotension issue which was really worse today    He was recently admitted and discharged in December at which time he was discharged on midodrine 2 5 mg twice daily along with stockings and binder and Florinef and hydrocortisone it seems like this has been increased outpatient and on 12/20 PCP increased it to 10 mg 3 times a day from 2 times a day  Also of note, 4 lb weight loss recently and urinary and bowel incontinence  On exam, confused but awake and alert  See [facility] results below  EKG NSR, trop negative  Plan:  CT head, bowel regimen, apply stockings, abd binder, orthostatic qs, accuchecks w/ SSI, pt ot eval     1/7 Inpatient admission: IVF, obtain echo, cardiology following, hold BB, continue current dose of florinef and midodrine, check orthostatics  Continue accuchecks w/ SSI  PT OT recommends rehab  1/7 Cardiology Consult:  Metoprolol is likely cause of this episode of syncope with severe orthostatic hypotension dependent on midodrine, florinef, compression stockings, and abdominal binder  Stop metoprolol going forward  C/w Midodrine 10mg TID; stockings, abdominal binder, fluid replacement  Date:  1/8    Day 2:    Still with orthostatic hypotension this morning  Pt awake and alert  Continue IVF, fu on echo, hold BB, cardiology following, IVF, home meds, accuchecks w/ SSI      ED Triage Vitals [01/06/23 1036]   Temperature Pulse Respirations Blood Pressure SpO2   98 7 °F (37 1 °C) 78 20 104/57 100 %      Temp Source Heart Rate Source Patient Position - Orthostatic VS BP Location FiO2 (%)   Oral Monitor Sitting Right arm --      Pain Score       No Pain          Wt Readings from Last 1 Encounters:   01/06/23 84 kg (185 lb 3 oz)     Additional Vital Signs:   Date/Time Temp Pulse Resp BP MAP (mmHg) SpO2 O2 Device Patient Position - Orthostatic VS   01/08/23 11:13:51 96 8 °F (36 °C) Abnormal  67 16 95/55 68 98 % -- --   01/08/23 10:47:07 -- 71 -- 78/35 Abnormal  49 Abnormal  100 % -- Standing   01/08/23 10:45:48 -- 66 -- 110/57 75 99 % -- Sitting   01/08/23 10:44:03 -- 73 -- 159/94 116 98 % -- Lying 01/08/23 0813 -- -- -- 140/80 -- -- -- --   01/08/23 07:47:40 97 2 °F (36 2 °C) Abnormal  83 16 173/94 Abnormal  120 97 % -- --   01/08/23 0307 97 6 °F (36 4 °C) -- -- -- -- -- -- --   01/08/23 03:04:46 -- 84 20 169/92 118 99 % -- --   01/07/23 21:18:20 97 2 °F (36 2 °C) Abnormal  73 18 163/89 114 99 % -- --   01/07/23 19:13:54 97 2 °F (36 2 °C) Abnormal  70 -- 102/54 70 99 % -- Standing - Orthostatic VS   01/07/23 19:12:39 97 2 °F (36 2 °C) Abnormal  68 -- 109/57 74 100 % -- Sitting - Orthostatic VS   01/07/23 19:11:21 97 2 °F (36 2 °C) Abnormal  67 18 136/70 92 99 % -- Lying - Orthostatic VS   01/07/23 15:07:19 97 5 °F (36 4 °C) 67 19 113/55 74 98 % -- --   01/07/23 1000 -- -- -- 94/50 -- -- -- --   01/07/23 09:50:45 -- 66 -- 81/47 Abnormal  58 Abnormal  99 % -- --   01/07/23 09:43:58 -- 64 -- 74/51 Abnormal  59 Abnormal  98 % -- --   01/07/23 09:42:23 -- 76 -- 73/52 Abnormal  59 Abnormal  98 % -- --   01/07/23 09:40:51 -- 67 -- 73/52 Abnormal  59 Abnormal  100 % -- --   01/07/23 09:38:02 -- 68 -- 118/58 78 98 % -- --   01/07/23 0756 -- -- -- 120/68 -- -- -- --   01/07/23 07:42:52 97 °F (36 1 °C) Abnormal  69 16 182/97 Abnormal  125 98 % -- --   01/07/23 06:02:36 -- 78 -- 77/44 Abnormal  55 Abnormal  100 % -- Standing - Orthostatic VS   01/07/23 06:00:24 -- 71 -- 112/60 77 99 % -- Sitting - Orthostatic VS   01/07/23 05:57:53 -- 65 -- 152/80 104 97 % -- Lying - Orthostatic VS   01/06/23 21:02:49 97 5 °F (36 4 °C) 63 -- 108/58 75 99 % -- --   01/06/23 1935 -- -- -- -- -- -- None (Room air) --   01/06/23 17:58:59 97 9 °F (36 6 °C) 75 20 140/73 95 100 % -- --   01/06/23 1545 -- 84 16 125/83 100 99 % None (Room air) --   01/06/23 1200 -- 68 20 137/64 92 99 % None (Room air) Sitting   01/06/23 1036 98 7 °F (37 1 °C) 78 20 104/57 -- 100 % None (Room air) Sitting     Pertinent Labs/Diagnostic Test Results:   1/6 EKG: Normal sinus rhythm  T wave abnormality, consider inferior ischemia  Abnormal ECG    CT head wo contrast Final Result by Miller Ferreira MD (01/06 1534)      1  No acute intracranial CT abnormality  2   Stable chronic bilateral gangliocapsular infarcts  Chronic microangiopathy  3   Right maxillary sinus mucosal thickening containing hyperattenuating foci may indicate inspissated secretions versus fungal colonization                    Workstation performed: [id number]               Results from last 7 days   Lab Units 01/08/23  0526 01/07/23  0737 01/06/23  1053   WBC Thousand/uL 8 74 8 57 8 25   HEMOGLOBIN g/dL 9 9* 10 5* 9 4*   HEMATOCRIT % 33 0* 33 6* 30 3*   PLATELETS Thousands/uL 197 218 204   NEUTROS ABS Thousands/µL  --  4 93 4 66         Results from last 7 days   Lab Units 01/08/23  0526 01/06/23  1053   SODIUM mmol/L 141 140   POTASSIUM mmol/L 4 2 3 7   CHLORIDE mmol/L 109* 112*   CO2 mmol/L 25 25   ANION GAP mmol/L 7 3*   BUN mg/dL 15 18   CREATININE mg/dL 0 98 1 26   EGFR ml/min/1 73sq m 80 59   CALCIUM mg/dL 8 8 8 5     Results from last 7 days   Lab Units 01/06/23  1053   AST U/L 8   ALT U/L 13   ALK PHOS U/L 76   TOTAL PROTEIN g/dL 6 3*   ALBUMIN g/dL 3 1*   TOTAL BILIRUBIN mg/dL 0 32     Results from last 7 days   Lab Units 01/08/23  1127 01/08/23  0839 01/07/23  2109 01/07/23  1602 01/07/23  1037 01/07/23  0536 01/06/23  2239 01/06/23  2201 01/06/23  2018 01/06/23  1754 01/06/23  1445 01/06/23  1037   POC GLUCOSE mg/dl 220* 270* 104 157* 253* 117 88 66 110 154* 176* 132     Results from last 7 days   Lab Units 01/08/23  0526 01/06/23  1053   GLUCOSE RANDOM mg/dL 200* 123     Results from last 7 days   Lab Units 01/06/23  1916 01/06/23  1704 01/06/23  1448   HS TNI 0HR ng/L  --   --  11   HS TNI 2HR ng/L  --  10  --    HSTNI D2 ng/L  --  -1  --    HS TNI 4HR ng/L 11  --   --    HSTNI D4 ng/L 0  --   --        ED Treatment:   Medication Administration from 01/06/2023 1030 to 01/06/2023 1748       Date/Time Order Dose Route Action     01/06/2023 1512 EST insulin regular (HumuLIN R,NovoLIN R) injection 6 Units 6 Units Subcutaneous Given     01/06/2023 1514 EST aspirin chewable tablet 81 mg 81 mg Oral Given     01/06/2023 1514 EST atorvastatin (LIPITOR) tablet 10 mg 10 mg Oral Given     01/06/2023 1547 EST calcitriol (ROCALTROL) capsule 0 25 mcg 0 25 mcg Oral Given     01/06/2023 1546 EST cyanocobalamin (VITAMIN B-12) tablet 100 mcg 100 mcg Oral Given     01/06/2023 1546 EST fludrocortisone (FLORINEF) tablet 0 2 mg 0 2 mg Oral Given     01/06/2023 1546 EST hydrocortisone (CORTEF) tablet 10 mg 10 mg Oral Given     01/06/2023 1547 EST sertraline (ZOLOFT) tablet 100 mg 100 mg Oral Given     01/06/2023 1545 EST midodrine (PROAMATINE) tablet 10 mg 10 mg Oral Given        Past Medical History:   Diagnosis Date   • Diabetes mellitus ([facility] )    • Obesity, morbid ([facility] ) 11/14/2022     Present on Admission:  • Insulin dependent type 1 diabetes mellitus   • Adrenal insufficiency   • Paroxysmal atrial fibrillation   • Orthostatic hypotension  • Psychiatric disorder  • Anemia of chronic disease      Admitting Diagnosis: Syncope [R55]  Low blood pressure [I95 9]  Age/Sex: 74 y o  male  Admission Orders:  Scheduled Medications:  apixaban, 5 mg, Oral, BID  aspirin, 81 mg, Oral, Daily  atorvastatin, 10 mg, Oral, Daily  calcitriol, 0 25 mcg, Oral, Daily  cyanocobalamin, 100 mcg, Oral, Daily  fludrocortisone, 0 2 mg, Oral, Daily  hydrocortisone, 10 mg, Oral, QPM  hydrocortisone, 15 mg, Oral, Daily  insulin glargine, 20 Units, Subcutaneous, HS  insulin lispro, 1-5 Units, Subcutaneous, TID AC  insulin lispro, 1-5 Units, Subcutaneous, HS  insulin lispro, 7 Units, Subcutaneous, TID With Meals  midodrine, 10 mg, Oral, TID AC  potassium chloride, 20 mEq, Oral, BID  risperiDONE, 1 mg, Oral, BID  sertraline, 100 mg, Oral, Daily      Continuous IV Infusions:  multi-electrolyte, 50 mL/hr, Intravenous, Continuous      PRN Meds: none     scd    IP CONSULT TO CARDIOLOGY    Network Utilization Review Department  ATTENTION: Please call with any questions or concerns to [phone] and carefully listen to the prompts so that you are directed to the right person  All voicemails are confidential   Moy Poe all requests for admission clinical reviews, approved or denied determinations and any other requests to dedicated fax number below belonging to the campus where the patient is receiving treatment   List of dedicated fax numbers for the Facilities:  [address] DENIALS (Administrative/Medical Necessity) [phone]   [address] (Maternity/NICU/Pediatrics) [phone]   8 Lizz Fuller [phone]   [place] [address] [phone]   [address] [address] [address] [facility]ff[facility] 28 [phone]   [facility] 134 815 [facility] [phone]

## 2023-01-07 NOTE — PLAN OF CARE
Problem: PHYSICAL THERAPY ADULT  Goal: Performs mobility at highest level of function for planned discharge setting  See evaluation for individualized goals  Description: Treatment/Interventions: Functional transfer training, LE strengthening/ROM, Elevations, Therapeutic exercise, Endurance training, Cognitive reorientation, Patient/family training, Equipment eval/education, Bed mobility, Gait training          See flowsheet documentation for full assessment, interventions and recommendations  Note: Prognosis: Fair  Problem List: Decreased strength, Impaired balance, Decreased endurance, Decreased mobility, Decreased cognition, Decreased safety awareness  Assessment: Pt is a 74 y o  male seen for PT evaluation s[facility]p [facility]/2023  Pt was admitted with a primary dx of: Syncope  PT now consulted for assessment of mobility and d/c needs  Pt with Up with assistance orders  Pts current comorbidities and personal factors effecting treatment include: DM, Afib, CVA, Chronic anemia, orthostatic hypotension  Pts current clinical presentation is Unstable/Unpredictable (high complexity) due to Ongoing medical management for primary dx, Increased reliance on more restrictive AD compared to baseline, Decreased activity tolerance compared to baseline, Fall risk, Cog status, Trending lab values, unstable vital signs  Prior to admission, pt was independent with use of RW  Upon evaluation, pt currently is requiring Supervision for bed mobility; Andrew for transfers, unable to progress further secondary to hypotension  Pt presents at PT eval functioning below baseline and currently w/ overall mobility deficits 2* to: BLE weakness, impaired balance, decreased endurance, gait deviations, decreased activity tolerance compared to baseline, decreased functional mobility tolerance compared to baseline, decreased safety awareness, fall risk  Pt currently at a fall risk 2* to impairments listed above    Pt will continue to benefit from skilled acute PT interventions to address stated impairments; to maximize functional mobility; for ongoing pt/ family training; and DME needs  At conclusion of PT session pt returned BTB and bed alarm engaged with phone and call bell within reach  Pt denies any further questions at this time  Recommend IP rehab upon hospital D/C  Barriers to Discharge: Inaccessible home environment, Decreased caregiver support     PT Discharge Recommendation: Post acute rehabilitation services    See flowsheet documentation for full assessment

## 2023-01-07 NOTE — PHYSICAL THERAPY NOTE
Physical Therapy Evaluation    Patient's Name: Andrae Pulliam    Admitting Diagnosis  Syncope [R55]  Low blood pressure [I95 9]    Problem List  Patient Active Problem List   Diagnosis    Recurrent hypoglycemia    Insulin dependent type 1 diabetes mellitus     Adrenal insufficiency     Essential hypertension    Paroxysmal atrial fibrillation     History of stroke    Anemia of chronic disease    Psychiatric disorder    Type 2 MI (myocardial infarction) ([facility] )    Orthostatic hypotension    Unintentional weight loss    Unspecified protein-calorie malnutrition ([facility] )    Acute encephalopathy    Syncope       Past Medical History  Past Medical History:   Diagnosis Date    Diabetes mellitus ([facility] )     Obesity, morbid ([facility] ) 11/14/2022       Past Surgical History  History reviewed  No pertinent surgical history  01/07/23 0953   PT Last Visit   PT Visit Date 01/07/23   Note Type   Note type Evaluation   Pain Assessment   Pain Assessment Tool 0-10   Pain Score No Pain   Restrictions/Precautions   Weight Bearing Precautions Per Order No   Other Precautions (S)  Cognitive; Chair Alarm; Bed Alarm; Fall Risk  (orthostatic hypotension)   Home Living   Type of [address] [place] [address] Two level;Stairs to enter with rails  (5 RADHA)   Home Equipment Cane;Walker   Prior Function   Level of [place] Independent with functional mobility; Independent with ADLs   Lives With Significant other   Receives Help From [facility] in the last 6 months (S)  >10   Comments Pt reports he resides with his friend "Rhett Bell" and also has assistance from a local daughter  Pt reports he is ambulatory with RW at baseline     General   Family/Caregiver Present No   Cognition   Overall Cognitive Status Impaired   Attention Attends with cues to redirect   Orientation Level Oriented X4   Following Commands Follows one step commands with increased time or repetition   Comments Pt with flat affect, requires increased time for processing   RLE Assessment   RLE Assessment WFL  (Grossly 4/5)   LLE Assessment   LLE Assessment WFL  (Grossly 4/5)   Light Touch   RLE Light Touch Impaired   RLE Light Touch Comments Diminished distal B/L LE's   LLE Light Touch Impaired   Bed Mobility   Supine to Sit 5  Supervision   Sit to Supine 5  Supervision   Additional Comments BP supine 118/58, sitting EOB 73/52, abdominal binder donned 74/51, return to supine 81/47   Transfers   Sit to Stand 4  Minimal assistance   Additional items Assist x 1   Stand to Sit 4  Minimal assistance   Additional items Assist x 1   Additional Comments Pt reports dizziness with sitting EOB that resolved with time in position, attempted standing trial, increased lightheadedness, decreased participation in conversation after approx 20 seconds in standing, unable to obtain BP in standing, returned to sitting with improved alertness, returned to supine and BP assessed as noted above   Balance   Static Sitting Fair -   Dynamic Sitting Poor +   Static Standing Poor +   Dynamic Standing Poor +   Ambulatory Zero   Activity Tolerance   Activity Tolerance Treatment limited secondary to medical complications (Comment)  (hypotension)   Medical Staff Made Aware Co-evaluation with OT given medical complexity and limited activity tolerance   Nurse Made Aware RN updated following session, bed alarm engaged   Assessment   Prognosis Fair   Problem List Decreased strength; Impaired balance;Decreased endurance;Decreased mobility; Decreased cognition;Decreased safety awareness   Assessment Pt is a 74 y o  male seen for PT evaluation s/p admit to [facility] on 1/6/2023  Pt was admitted with a primary dx of: Syncope  PT now consulted for assessment of mobility and d/c needs  Pt with Up with assistance orders  Pts current comorbidities and personal factors effecting treatment include: DM, Afib, CVA, Chronic anemia, orthostatic hypotension   Pts current clinical presentation is Unstable/Unpredictable (high complexity) due to Ongoing medical management for primary dx, Increased reliance on more restrictive AD compared to baseline, Decreased activity tolerance compared to baseline, Fall risk, Cog status, Trending lab values, unstable vital signs  Prior to admission, pt was independent with use of RW  Upon evaluation, pt currently is requiring Supervision for bed mobility; Andrew for transfers, unable to progress further secondary to hypotension  Pt presents at PT eval functioning below baseline and currently w/ overall mobility deficits 2* to: BLE weakness, impaired balance, decreased endurance, gait deviations, decreased activity tolerance compared to baseline, decreased functional mobility tolerance compared to baseline, decreased safety awareness, fall risk  Pt currently at a fall risk 2* to impairments listed above  Pt will continue to benefit from skilled acute PT interventions to address stated impairments; to maximize functional mobility; for ongoing pt/ family training; and DME needs  At conclusion of PT session pt returned BTB and bed alarm engaged with phone and call bell within reach  Pt denies any further questions at this time  Recommend IP rehab upon hospital D/C  Barriers to Discharge Inaccessible home environment;Decreased caregiver support   Goals   Patient Goals to go home   STG Expiration Date 01/21/23   Short Term Goal #1 In 14 days pt will be able to: 1  Demonstrate ability to perform all aspects of bed mobility independently to improve functional safety  2  Perform functional transfers with LRAD independently to facilitate safe return to previous living environment  3   Ambulate 150 ft with LRAD independently with stable vitals to improve safety with household distances and reduce fall risk  4  Improve LE strength grades by 1 to increase ease of functional mobility with transfers and gait  5  Pt will demonstrate improved balance by one grade in order to decrease risk of falls   6  Climb 12 steps with 1 HR and supervision to simulate home  PT Treatment Day 0   Plan   Treatment/Interventions Functional transfer training;LE strengthening/ROM; Elevations; Therapeutic exercise; Endurance training;Cognitive reorientation;Patient/family training;Equipment eval/education; Bed mobility;Gait training   PT Frequency 3-5x/wk   Recommendation   PT Discharge Recommendation Post acute rehabilitation services   AM-PAC Basic Mobility Inpatient   Turning in Flat Bed Without Bedrails 4   Lying on Back to Sitting on Edge of Flat Bed Without Bedrails 3   Moving Bed to Chair 3   Standing Up From Chair Using Arms 3   Walk in Room 1   Climb 3-5 Stairs With Railing 1   Basic Mobility Inpatient Raw Score 15   Basic Mobility Standardized Score 36 97   Highest Level Of Mobility   JH-HLM Goal 4: Move to chair/commode   JH-HLM Achieved 3: Sit at edge of bed     Electa Kawasaki, PT, DPT, GCS

## 2023-01-07 NOTE — PLAN OF CARE
Problem: PAIN - ADULT  Goal: Verbalizes/displays adequate comfort level or baseline comfort level  Description: Interventions:  - Encourage patient to monitor pain and request assistance  - Assess pain using appropriate pain scale  - Administer analgesics based on type and severity of pain and evaluate response  - Implement non-pharmacological measures as appropriate and evaluate response  - Consider cultural and social influences on pain and pain management  - Notify physician/advanced practitioner if interventions unsuccessful or patient reports new pain  1/7/2023 0733 by Beba Torres RN  Outcome: Progressing  1/6/2023 1759 by Beba Torres RN  Outcome: Progressing     Problem: INFECTION - ADULT  Goal: Absence or prevention of progression during hospitalization  Description: INTERVENTIONS:  - Assess and monitor for signs and symptoms of infection  - Monitor lab/diagnostic results  - Monitor all insertion sites, i e  indwelling lines, tubes, and drains  - Monitor endotracheal if appropriate and nasal secretions for changes in amount and color  - [place] appropriate cooling/warming therapies per order  - Administer medications as ordered  - Instruct and encourage patient and family to use good hand hygiene technique  - Identify and instruct in appropriate isolation precautions for identified infection/condition  1/7/2023 0733 by Beba Torres RN  Outcome: Progressing  1/6/2023 1759 by Beba Torres RN  Outcome: Progressing     Problem: DISCHARGE PLANNING  Goal: Discharge to home or other facility with appropriate resources  Description: INTERVENTIONS:  - Identify barriers to discharge w/patient and caregiver  - Arrange for needed discharge resources and transportation as appropriate  - Identify discharge learning needs (meds, wound care, etc )  - Arrange for interpretive services to assist at discharge as needed  - Refer to Case Management Department for coordinating discharge planning if the patient needs post-hospital services based on physician/advanced practitioner order or complex needs related to functional status, cognitive ability, or social support system  1/7/2023 0733 by Aamir Roman RN  Outcome: Progressing  1/6/2023 1759 by Aamir Roman RN  Outcome: Progressing     Problem: Knowledge Deficit  Goal: Patient/family/caregiver demonstrates understanding of disease process, treatment plan, medications, and discharge instructions  Description: Complete learning assessment and assess knowledge base    Interventions:  - Provide teaching at level of understanding  - Provide teaching via preferred learning methods  1/7/2023 0733 by Aamir Roman RN  Outcome: Progressing  1/6/2023 1759 by Aamir Roman RN  Outcome: Progressing     Problem: CARDIOVASCULAR - ADULT  Goal: Maintains optimal cardiac output and hemodynamic stability  Description: INTERVENTIONS:  - Monitor I/O, vital signs and rhythm  - Monitor for S/S and trends of decreased cardiac output  - Administer and titrate ordered vasoactive medications to optimize hemodynamic stability  - Assess quality of pulses, skin color and temperature  - Assess for signs of decreased coronary artery perfusion  - Instruct patient to report change in severity of symptoms  1/7/2023 0733 by Aamir Roman RN  Outcome: Progressing  1/6/2023 1759 by Aamir Roman RN  Outcome: Progressing  Goal: Absence of cardiac dysrhythmias or at baseline rhythm  Description: INTERVENTIONS:  - Continuous cardiac monitoring, vital signs, obtain 12 lead EKG if ordered  - Administer antiarrhythmic and heart rate control medications as ordered  - Monitor electrolytes and administer replacement therapy as ordered  1/7/2023 0733 by Aamir Roman RN  Outcome: Progressing  1/6/2023 1759 by Aamir Roman RN  Outcome: Progressing     Problem: GASTROINTESTINAL - ADULT  Goal: Minimal or absence of nausea and/or vomiting  Description: INTERVENTIONS:  - Administer IV fluids if ordered to ensure adequate hydration  - Maintain NPO status until nausea and vomiting are resolved  - Nasogastric tube if ordered  - Administer ordered antiemetic medications as needed  - Provide nonpharmacologic comfort measures as appropriate  - Advance diet as tolerated, if ordered  - Consider nutrition services referral to assist patient with adequate nutrition and appropriate food choices  1/7/2023 0733 by Lobo Timmons RN  Outcome: Progressing  1/6/2023 1759 by Lobo Timmons RN  Outcome: Progressing  Goal: Maintains or returns to baseline bowel function  Description: INTERVENTIONS:  - Assess bowel function  - Encourage oral fluids to ensure adequate hydration  - Administer IV fluids if ordered to ensure adequate hydration  - Administer ordered medications as needed  - Encourage mobilization and activity  - Consider nutritional services referral to assist patient with adequate nutrition and appropriate food choices  1/7/2023 0733 by Lobo Timmons RN  Outcome: Progressing  1/6/2023 1759 by Lobo Timmons RN  Outcome: Progressing  Goal: Maintains adequate nutritional intake  Description: INTERVENTIONS:  - Monitor percentage of each meal consumed  - Identify factors contributing to decreased intake, treat as appropriate  - Assist with meals as needed  - Monitor I&O, weight, and lab values if indicated  - Obtain nutrition services referral as needed  1/7/2023 0733 by Lobo Timmons RN  Outcome: Progressing  1/6/2023 1759 by Lobo Timmons RN  Outcome: Progressing     Problem: METABOLIC, FLUID AND ELECTROLYTES - ADULT  Goal: Electrolytes maintained within normal limits  Description: INTERVENTIONS:  - Monitor labs and assess patient for signs and symptoms of electrolyte imbalances  - Administer electrolyte replacement as ordered  - Monitor response to electrolyte replacements, including repeat lab results as appropriate  - Instruct patient on fluid and nutrition as appropriate  1/7/2023 0733 by Bobby Craft RN  Outcome: Progressing  1/6/2023 1759 by Bobby Craft RN  Outcome: Progressing  Goal: Glucose maintained within target range  Description: INTERVENTIONS:  - Monitor Blood Glucose as ordered  - Assess for signs and symptoms of hyperglycemia and hypoglycemia  - Administer ordered medications to maintain glucose within target range  - Assess nutritional intake and initiate nutrition service referral as needed  1/7/2023 0733 by Bobby Craft RN  Outcome: Progressing  1/6/2023 1759 by Bobby Craft RN  Outcome: Progressing

## 2023-01-08 LAB
ANION GAP SERPL CALCULATED.3IONS-SCNC: 7 MMOL/L (ref 4–13)
BUN SERPL-MCNC: 15 MG/DL (ref 5–25)
CALCIUM SERPL-MCNC: 8.8 MG/DL (ref 8.3–10.1)
CHLORIDE SERPL-SCNC: 109 MMOL/L (ref 96–108)
CO2 SERPL-SCNC: 25 MMOL/L (ref 21–32)
CREAT SERPL-MCNC: 0.98 MG/DL (ref 0.6–1.3)
ERYTHROCYTE [DISTWIDTH] IN BLOOD BY AUTOMATED COUNT: 15.8 % (ref 11.6–15.1)
GFR SERPL CREATININE-BSD FRML MDRD: 80 ML/MIN/1.73SQ M
GLUCOSE SERPL-MCNC: 191 MG/DL (ref 65–140)
GLUCOSE SERPL-MCNC: 200 MG/DL (ref 65–140)
GLUCOSE SERPL-MCNC: 209 MG/DL (ref 65–140)
GLUCOSE SERPL-MCNC: 220 MG/DL (ref 65–140)
GLUCOSE SERPL-MCNC: 270 MG/DL (ref 65–140)
HCT VFR BLD AUTO: 33 % (ref 36.5–49.3)
HGB BLD-MCNC: 9.9 G/DL (ref 12–17)
MCH RBC QN AUTO: 27.8 PG (ref 26.8–34.3)
MCHC RBC AUTO-ENTMCNC: 30 G/DL (ref 31.4–37.4)
MCV RBC AUTO: 93 FL (ref 82–98)
PLATELET # BLD AUTO: 197 THOUSANDS/UL (ref 149–390)
PMV BLD AUTO: 11.6 FL (ref 8.9–12.7)
POTASSIUM SERPL-SCNC: 4.2 MMOL/L (ref 3.5–5.3)
RBC # BLD AUTO: 3.56 MILLION/UL (ref 3.88–5.62)
SODIUM SERPL-SCNC: 141 MMOL/L (ref 135–147)
WBC # BLD AUTO: 8.74 THOUSAND/UL (ref 4.31–10.16)

## 2023-01-08 RX ORDER — INSULIN GLARGINE 100 [IU]/ML
20 INJECTION, SOLUTION SUBCUTANEOUS
Status: DISCONTINUED | OUTPATIENT
Start: 2023-01-08 | End: 2023-01-10

## 2023-01-08 RX ORDER — HYDROCORTISONE 20 MG/1
20 TABLET ORAL 2 TIMES DAILY
Status: DISCONTINUED | OUTPATIENT
Start: 2023-01-08 | End: 2023-01-09

## 2023-01-08 RX ORDER — INSULIN LISPRO 100 [IU]/ML
7 INJECTION, SOLUTION INTRAVENOUS; SUBCUTANEOUS
Status: DISCONTINUED | OUTPATIENT
Start: 2023-01-08 | End: 2023-01-12

## 2023-01-08 RX ADMIN — POTASSIUM CHLORIDE 20 MEQ: 750 TABLET, EXTENDED RELEASE ORAL at 17:09

## 2023-01-08 RX ADMIN — MIDODRINE HYDROCHLORIDE 10 MG: 5 TABLET ORAL at 06:05

## 2023-01-08 RX ADMIN — CALCITRIOL CAPSULES 0.25 MCG 0.25 MCG: 0.25 CAPSULE ORAL at 08:37

## 2023-01-08 RX ADMIN — SODIUM CHLORIDE, SODIUM GLUCONATE, SODIUM ACETATE, POTASSIUM CHLORIDE, MAGNESIUM CHLORIDE, SODIUM PHOSPHATE, DIBASIC, AND POTASSIUM PHOSPHATE 50 ML/HR: .53; .5; .37; .037; .03; .012; .00082 INJECTION, SOLUTION INTRAVENOUS at 20:22

## 2023-01-08 RX ADMIN — INSULIN LISPRO 1 UNITS: 100 INJECTION, SOLUTION INTRAVENOUS; SUBCUTANEOUS at 21:33

## 2023-01-08 RX ADMIN — SERTRALINE 100 MG: 100 TABLET, FILM COATED ORAL at 08:40

## 2023-01-08 RX ADMIN — MIDODRINE HYDROCHLORIDE 10 MG: 5 TABLET ORAL at 17:09

## 2023-01-08 RX ADMIN — INSULIN LISPRO 6 UNITS: 100 INJECTION, SOLUTION INTRAVENOUS; SUBCUTANEOUS at 11:57

## 2023-01-08 RX ADMIN — HYDROCORTISONE 20 MG: 20 TABLET ORAL at 17:09

## 2023-01-08 RX ADMIN — RISPERIDONE 1 MG: 1 TABLET ORAL at 17:09

## 2023-01-08 RX ADMIN — ASPIRIN 81 MG: 81 TABLET, CHEWABLE ORAL at 08:37

## 2023-01-08 RX ADMIN — APIXABAN 5 MG: 5 TABLET, FILM COATED ORAL at 08:38

## 2023-01-08 RX ADMIN — INSULIN LISPRO 1 UNITS: 100 INJECTION, SOLUTION INTRAVENOUS; SUBCUTANEOUS at 17:32

## 2023-01-08 RX ADMIN — INSULIN LISPRO 3 UNITS: 100 INJECTION, SOLUTION INTRAVENOUS; SUBCUTANEOUS at 08:40

## 2023-01-08 RX ADMIN — ATORVASTATIN CALCIUM 10 MG: 10 TABLET, FILM COATED ORAL at 08:38

## 2023-01-08 RX ADMIN — RISPERIDONE 1 MG: 1 TABLET ORAL at 08:38

## 2023-01-08 RX ADMIN — MIDODRINE HYDROCHLORIDE 10 MG: 5 TABLET ORAL at 11:56

## 2023-01-08 RX ADMIN — APIXABAN 5 MG: 5 TABLET, FILM COATED ORAL at 17:09

## 2023-01-08 RX ADMIN — INSULIN LISPRO 7 UNITS: 100 INJECTION, SOLUTION INTRAVENOUS; SUBCUTANEOUS at 17:32

## 2023-01-08 RX ADMIN — HYDROCORTISONE 15 MG: 10 TABLET ORAL at 08:38

## 2023-01-08 RX ADMIN — INSULIN GLARGINE 20 UNITS: 100 INJECTION, SOLUTION SUBCUTANEOUS at 21:33

## 2023-01-08 RX ADMIN — VITAM B12 100 MCG: 100 TAB at 08:38

## 2023-01-08 RX ADMIN — INSULIN LISPRO 2 UNITS: 100 INJECTION, SOLUTION INTRAVENOUS; SUBCUTANEOUS at 11:57

## 2023-01-08 RX ADMIN — FLUDROCORTISONE ACETATE 0.2 MG: 0.1 TABLET ORAL at 08:39

## 2023-01-08 RX ADMIN — POTASSIUM CHLORIDE 20 MEQ: 750 TABLET, EXTENDED RELEASE ORAL at 08:37

## 2023-01-08 RX ADMIN — INSULIN LISPRO 6 UNITS: 100 INJECTION, SOLUTION INTRAVENOUS; SUBCUTANEOUS at 08:40

## 2023-01-08 NOTE — ASSESSMENT & PLAN NOTE
----- Message from Howard Estrada MD sent at 7/8/2019  7:49 AM CDT -----  Please inform patient to continue present dose of magnesium.   · Syncope/near syncope x2-3  · Also had another similar episode this morning while working with physical therapy which was likely related to orthostatic hypotension as well and his orthostatic vitals were positive for hypotension     · Most likely suspect secondary to orthostatic hypotension only  · EKG - NSR  Troponin negative  · Ct head: No acute intracranial CT abnormality  Stable chronic bilateral gangliocapsular infarcts  Chronic microangiopathy  Right maxillary sinus mucosal thickening containing hyperattenuating foci may indicate inspissated secretions versus fungal colonization  · Lost 4 lb in few days - ? If that contributing    He is scheduled to get EGD and colonoscopy end of this month    PLAN:  · Continue IVF  · Obtain echocardiogram  · Cardiology following  · Hold metoprolol for now  · Continue current dose of Florinef and midodrine  · PT OT evaluation>> recommend rehab

## 2023-01-08 NOTE — PROGRESS NOTES
Reny Stevens 1481 1955, 74 y o  male MRN: [id number]  Unit/Bed#: [facility] 831-01 Encounter: [id number]  Primary Care Provider: Monster Hagan MD   Date and time admitted to hospital: 1/6/2023 10:30 AM    * Syncope  Assessment & Plan  · Syncope/near syncope x2-3  · Also had another similar episode this morning while working with physical therapy which was likely related to orthostatic hypotension as well and his orthostatic vitals were positive for hypotension     · Most likely suspect secondary to orthostatic hypotension only  · EKG - NSR  Troponin negative  · Ct head: No acute intracranial CT abnormality  Stable chronic bilateral gangliocapsular infarcts  Chronic microangiopathy  Right maxillary sinus mucosal thickening containing hyperattenuating foci may indicate inspissated secretions versus fungal colonization  · Lost 4 lb in few days - ? If that contributing  He is scheduled to get EGD and colonoscopy end of this month    PLAN:  · Continue IVF  · Obtain echocardiogram  · Cardiology following  · Hold metoprolol for now  · Continue current dose of Florinef and midodrine  · PT OT evaluation>> recommend rehab    Orthostatic hypotension  Assessment & Plan  · History of such  · Current home regimen -Florinef 0 2 daily, hydrocortisone 15 mg in a m  and 10 mg in p m , midodrine 10 mg 3 times daily  · Given persistent and recurrent orthostatic hypotension, will increase hydrocortisone to 20 mg twice daily for now and obtain endocrine evaluation    · Seems like despite this regimen he was having orthostatic hypotension and near syncope  · Has had lost 4 pounds in a few days  · For now we will continue the bowel regimen, apply stockings, abdominal binder and check orthostatics qshift  · Cardiology follwoing  · IVF    Psychiatric disorder  Assessment & Plan  · Continue Risperidone and Zoloft at home dose    Anemia of chronic disease  Assessment & Plan  · Baseline seems to be between 8-10  · currently at baseline      History of stroke  Assessment & Plan  · Aspirin, statin, Eliquis    Paroxysmal atrial fibrillation   Assessment & Plan  · Hold metoprolol due to severe orthostatic hypotension  · Continue Eliquis    Adrenal insufficiency   Assessment & Plan  · Labs with endocrine outpatient  · Continue Florinef 0 2 mg daily  · On hydrocortisone 50 mg in a m  and 10 mg in evening  Given persistent and recurrent orthostatic hypotension, will increase hydrocortisone to 20 mg twice daily for now and obtain endocrine evaluation  Insulin dependent type 1 diabetes mellitus   Assessment & Plan  Lab Results   Component Value Date    HGBA1C 7 5 (H) 07/22/2022       Recent Labs     01/07/23  1602 01/07/23  2109 01/08/23  0839 01/08/23  1127   POCGLU 157* 104 270* 220*       Blood Sugar Average: Last 72 hrs:  (P) 779 [id number]     ·  home regimen of Lantus 18 units at bedtime, Humalog 6 units 3 times daily  · Increased dose slightly given hyperglycemia  · Continue ISS  · Follow-up with endocrine OP  · Hypoglycemia protocol      VTE Pharmacologic Prophylaxis:   Pharmacologic: Apixaban (Eliquis)  Mechanical VTE Prophylaxis in Place: Yes    Patient Centered Rounds: I have performed bedside rounds with nursing staff today  Discussions with Specialists or Other Care Team Provider: cm    Education and Discussions with Family / Patient: plan of care, patient/ also called and updated daughter,    Time Spent for Care: 30 minutes  More than 50% of total time spent on counseling and coordination of care as described above  Current Length of Stay: 1 day(s)    Current Patient Status: Inpatient   Certification Statement: The patient will continue to require additional inpatient hospital stay due to not medically stable due to need for IVF    Discharge Plan: as above, rehab on dsicharge    Code Status: Level 1 - Full Code      Subjective:   No overnight events   Still with orthostatic hypotension this morning  Denies any dizziness, chest pain, SOB  Objective:     Vitals:   Temp (24hrs), Av 2 °F (36 2 °C), Min:96 8 °F (36 °C), Max:97 6 °F (36 4 °C)    Temp:  [96 8 °F (36 °C)-97 6 °F (36 4 °C)] 96 8 °F (36 °C)  HR:  [66-84] 67  Resp:  [16-20] 16  BP: ()/(35-94) 95/55  SpO2:  [97 %-100 %] 98 %  Body mass index is 31 79 kg/m²  Input and Output Summary (last 24 hours): Intake/Output Summary (Last 24 hours) at 2023 1353  Last data filed at 2023 1300  Gross per 24 hour   Intake 1642 5 ml   Output 1600 ml   Net 42 5 ml       Physical Exam:     Physical Exam  Constitutional:       General: He is not in acute distress  Cardiovascular:      Rate and Rhythm: Normal rate and regular rhythm  Heart sounds: Normal heart sounds  No murmur heard  Pulmonary:      Effort: No respiratory distress  Breath sounds: Normal breath sounds  No wheezing or rales  Abdominal:      General: Bowel sounds are normal  There is no distension  Palpations: Abdomen is soft  Tenderness: There is no abdominal tenderness  Skin:     General: Skin is warm  Neurological:      Mental Status: He is alert        Comments: Awake alert and communicative  Moves all extremities    Additional Data:     Labs:    Results from last 7 days   Lab Units 23  0526 23  0737   WBC Thousand/uL 8 74 8 57   HEMOGLOBIN g/dL 9 9* 10 5*   HEMATOCRIT % 33 0* 33 6*   PLATELETS Thousands/uL 197 218   NEUTROS PCT %  --  58   LYMPHS PCT %  --  26   MONOS PCT %  --  14*   EOS PCT %  --  2     Results from last 7 days   Lab Units 23  0526 23  1053   SODIUM mmol/L 141 140   POTASSIUM mmol/L 4 2 3 7   CHLORIDE mmol/L 109* 112*   CO2 mmol/L 25 25   BUN mg/dL 15 18   CREATININE mg/dL 0 98 1 26   ANION GAP mmol/L 7 3*   CALCIUM mg/dL 8 8 8 5   ALBUMIN g/dL  --  3 1*   TOTAL BILIRUBIN mg/dL  --  0 32   ALK PHOS U/L  --  76   ALT U/L  --  13   AST U/L  --  8   GLUCOSE RANDOM mg/dL 200* 123         Results from last 7 days   Lab Units 01/08/23  1127 01/08/23  0839 01/07/23  2109 01/07/23  1602 01/07/23  1037 01/07/23  0536 01/06/23  2239 01/06/23  2201 01/06/23  2018 01/06/23  1754 01/06/23  1445 01/06/23  1037   POC GLUCOSE mg/dl 220* 270* 104 157* 253* 117 88 66 110 154* 176* 132                   * I Have Reviewed All Lab Data Listed Above  * Additional Pertinent Lab Tests Reviewed: All Labs Within Last 24 Hours Reviewed    Imaging:    CT head wo contrast   Final Result by Martinez Mcpherson MD (01/06 1534)      1  No acute intracranial CT abnormality  2   Stable chronic bilateral gangliocapsular infarcts  Chronic microangiopathy  3   Right maxillary sinus mucosal thickening containing hyperattenuating foci may indicate inspissated secretions versus fungal colonization                    Workstation performed: [id number]             Recent Cultures (last 7 days):           Last 24 Hours Medication List:   Current Facility-Administered Medications   Medication Dose Route Frequency Provider Last Rate   • apixaban  5 mg Oral BID Bandar Webber MD     • aspirin  81 mg Oral Daily Bandar Webber MD     • atorvastatin  10 mg Oral Daily Bandar Webber MD     • calcitriol  0 25 mcg Oral Daily Bandar Webber MD     • cyanocobalamin  100 mcg Oral Daily Bandar Webber MD     • fludrocortisone  0 2 mg Oral Daily Bandar Webber MD     • hydrocortisone  20 mg Oral BID Greyson Hercules MD     • insulin glargine  20 Units Subcutaneous HS Greyson Hercules MD     • insulin lispro  1-5 Units Subcutaneous TID AC Sue Pedro MD     • insulin lispro  1-5 Units Subcutaneous HS Bandar Webber MD     • insulin lispro  7 Units Subcutaneous TID With Meals Greyson Hercules MD     • midodrine  10 mg Oral TID AC Bandar Webber MD     • multi-electrolyte  50 mL/hr Intravenous Continuous Greyson Hercules MD 50 mL/hr (01/08/23 1201)   • potassium chloride  20 mEq Oral BID Sue HINTON Samir Moreno MD     • risperiDONE  1 mg Oral BID Kendall Solano MD     • sertraline  100 mg Oral Daily Kendall Solano MD          Today, Patient Was Seen By: Sasha Cohen MD    ** Please Note: Dictation voice to text software may have been used in the creation of this document   **

## 2023-01-08 NOTE — PLAN OF CARE
Problem: PAIN - ADULT  Goal: Verbalizes/displays adequate comfort level or baseline comfort level  Description: Interventions:  - Encourage patient to monitor pain and request assistance  - Assess pain using appropriate pain scale  - Administer analgesics based on type and severity of pain and evaluate response  - Implement non-pharmacological measures as appropriate and evaluate response  - Consider cultural and social influences on pain and pain management  - Notify physician/advanced practitioner if interventions unsuccessful or patient reports new pain  Outcome: Progressing     Problem: INFECTION - ADULT  Goal: Absence or prevention of progression during hospitalization  Description: INTERVENTIONS:  - Assess and monitor for signs and symptoms of infection  - Monitor lab/diagnostic results  - Monitor all insertion sites, i e  indwelling lines, tubes, and drains  - Monitor endotracheal if appropriate and nasal secretions for changes in amount and color  - [place] appropriate cooling/warming therapies per order  - Administer medications as ordered  - Instruct and encourage patient and family to use good hand hygiene technique  - Identify and instruct in appropriate isolation precautions for identified infection/condition  Outcome: Progressing     Problem: DISCHARGE PLANNING  Goal: Discharge to home or other facility with appropriate resources  Description: INTERVENTIONS:  - Identify barriers to discharge w/patient and caregiver  - Arrange for needed discharge resources and transportation as appropriate  - Identify discharge learning needs (meds, wound care, etc )  - Arrange for interpretive services to assist at discharge as needed  - Refer to Case Management Department for coordinating discharge planning if the patient needs post-hospital services based on physician/advanced practitioner order or complex needs related to functional status, cognitive ability, or social support system  Outcome: Progressing Problem: Knowledge Deficit  Goal: Patient/family/caregiver demonstrates understanding of disease process, treatment plan, medications, and discharge instructions  Description: Complete learning assessment and assess knowledge base    Interventions:  - Provide teaching at level of understanding  - Provide teaching via preferred learning methods  Outcome: Progressing     Problem: CARDIOVASCULAR - ADULT  Goal: Maintains optimal cardiac output and hemodynamic stability  Description: INTERVENTIONS:  - Monitor I/O, vital signs and rhythm  - Monitor for S/S and trends of decreased cardiac output  - Administer and titrate ordered vasoactive medications to optimize hemodynamic stability  - Assess quality of pulses, skin color and temperature  - Assess for signs of decreased coronary artery perfusion  - Instruct patient to report change in severity of symptoms  Outcome: Progressing  Goal: Absence of cardiac dysrhythmias or at baseline rhythm  Description: INTERVENTIONS:  - Continuous cardiac monitoring, vital signs, obtain 12 lead EKG if ordered  - Administer antiarrhythmic and heart rate control medications as ordered  - Monitor electrolytes and administer replacement therapy as ordered  Outcome: Progressing     Problem: GASTROINTESTINAL - ADULT  Goal: Minimal or absence of nausea and/or vomiting  Description: INTERVENTIONS:  - Administer IV fluids if ordered to ensure adequate hydration  - Maintain NPO status until nausea and vomiting are resolved  - Nasogastric tube if ordered  - Administer ordered antiemetic medications as needed  - Provide nonpharmacologic comfort measures as appropriate  - Advance diet as tolerated, if ordered  - Consider nutrition services referral to assist patient with adequate nutrition and appropriate food choices  Outcome: Progressing  Goal: Maintains or returns to baseline bowel function  Description: INTERVENTIONS:  - Assess bowel function  - Encourage oral fluids to ensure adequate hydration  - Administer IV fluids if ordered to ensure adequate hydration  - Administer ordered medications as needed  - Encourage mobilization and activity  - Consider nutritional services referral to assist patient with adequate nutrition and appropriate food choices  Outcome: Progressing  Goal: Maintains adequate nutritional intake  Description: INTERVENTIONS:  - Monitor percentage of each meal consumed  - Identify factors contributing to decreased intake, treat as appropriate  - Assist with meals as needed  - Monitor I&O, weight, and lab values if indicated  - Obtain nutrition services referral as needed  Outcome: Progressing     Problem: METABOLIC, FLUID AND ELECTROLYTES - ADULT  Goal: Electrolytes maintained within normal limits  Description: INTERVENTIONS:  - Monitor labs and assess patient for signs and symptoms of electrolyte imbalances  - Administer electrolyte replacement as ordered  - Monitor response to electrolyte replacements, including repeat lab results as appropriate  - Instruct patient on fluid and nutrition as appropriate  Outcome: Progressing  Goal: Glucose maintained within target range  Description: INTERVENTIONS:  - Monitor Blood Glucose as ordered  - Assess for signs and symptoms of hyperglycemia and hypoglycemia  - Administer ordered medications to maintain glucose within target range  - Assess nutritional intake and initiate nutrition service referral as needed  Outcome: Progressing     Problem: MOBILITY - ADULT  Goal: Maintain or return to baseline ADL function  Description: INTERVENTIONS:  -  Assess patient's ability to carry out ADLs; assess patient's baseline for ADL function and identify physical deficits which impact ability to perform ADLs (bathing, care of mouth/teeth, toileting, grooming, dressing, etc )  - Assess/evaluate cause of self-care deficits   - Assess range of motion  - Assess patient's mobility; develop plan if impaired  - Assess patient's need for assistive devices and provide as appropriate  - Encourage maximum independence but intervene and supervise when necessary  - Involve family in performance of ADLs  - Assess for home care needs following discharge   - Consider OT consult to assist with ADL evaluation and planning for discharge  - Provide patient education as appropriate  Outcome: Progressing  Goal: Maintains/Returns to pre admission functional level  Description: INTERVENTIONS:  - Perform BMAT or MOVE assessment daily    - Set and communicate daily mobility goal to care team and patient/family/caregiver     - Collaborate with rehabilitation services on mobility goals if consulted  - Out of bed for toileting  - Record patient progress and toleration of activity level   Outcome: Progressing

## 2023-01-08 NOTE — PROGRESS NOTES
Returned a Lyrica under this patient's name in [facility] but it should have been returned under [phone]    Pharmacy ask me to just make a note that it was returned to [facility] under this patient in error

## 2023-01-08 NOTE — ASSESSMENT & PLAN NOTE
· Syncope/near syncope x2-3  · Also had another similar episode this morning while working with physical therapy which was likely related to orthostatic hypotension as well and his orthostatic vitals were positive for hypotension     · Most likely suspect secondary to orthostatic hypotension only  · EKG - NSR  Troponin negative  · Ct head: No acute intracranial CT abnormality  Stable chronic bilateral gangliocapsular infarcts  Chronic microangiopathy  Right maxillary sinus mucosal thickening containing hyperattenuating foci may indicate inspissated secretions versus fungal colonization  · Lost 4 lb in few days - ? If that contributing    He is scheduled to get EGD and colonoscopy end of this month  · Echo shows ef of 65% with grade 1 DD and no aortic stenosis,    PLAN  · Cardiology following  · Hold metoprolol for now  · Continue current dose of Florinef and midodrine  · PT OT evaluation>> recommend rehab

## 2023-01-08 NOTE — ASSESSMENT & PLAN NOTE
Lab Results   Component Value Date    HGBA1C 7 5 (H) 07/22/2022       Recent Labs     01/07/23  1602 01/07/23  2109 01/08/23  0839 01/08/23  1127   POCGLU 157* 104 270* 220*       Blood Sugar Average: Last 72 hrs:  (P) 022 [id number]     ·  home regimen of Lantus 18 units at bedtime, Humalog 6 units 3 times daily  · Increased dose slightly given hyperglycemia  · Continue ISS  · Follow-up with endocrine OP  · Hypoglycemia protocol

## 2023-01-08 NOTE — ASSESSMENT & PLAN NOTE
· History of such  · Current home regimen -Florinef 0 2 daily, hydrocortisone 15 mg in a m  and 10 mg in p m , midodrine 10 mg 3 times daily  · Given persistent and recurrent orthostatic hypotension, will increase hydrocortisone to 20 mg twice daily for now and await endocrine evaluation    · Seems like despite this regimen he was having orthostatic hypotension and near syncope  · Has had lost 4 pounds in a few days  · For now we will continue the bowel regimen, apply stockings, abdominal binder and check orthostatics qshift  · Cardiology follwoing  · IVF

## 2023-01-08 NOTE — ASSESSMENT & PLAN NOTE
Lab Results   Component Value Date    HGBA1C 7 5 (H) 07/22/2022       Recent Labs     01/07/23  1602 01/07/23  2109 01/08/23  0839 01/08/23  1127   POCGLU 157* 104 270* 220*       Blood Sugar Average: Last 72 hrs:  (P) 380 [id number]     ·  home regimen of Lantus 18 units at bedtime, Humalog 6 units 3 times daily  · Increased dose slightly given hyperglycemia  · Continue ISS  · Follow-up with endocrine OP  · Hypoglycemia protocol

## 2023-01-08 NOTE — ASSESSMENT & PLAN NOTE
· History of such  · Current home regimen -Florinef 0 2 daily, hydrocortisone 50 mg in a m  and 10 mg in p m , midodrine 10 mg 3 times daily    Was increased from twice daily to 3 times daily about 2 weeks ago  · Seems like despite this regimen he was having orthostatic hypotension and near syncope  · Has had lost 4 pounds in a few days  · For now we will continue the bowel regimen, apply stockings, abdominal binder and check orthostatics qshift  · Cardiology follwoing  · IVF

## 2023-01-08 NOTE — ASSESSMENT & PLAN NOTE
· Labs with endocrine outpatient  · Continue Florinef 0 2 mg daily  · On hydrocortisone 50 mg in a m  and 10 mg in evening  Given persistent and recurrent orthostatic hypotension, will increase hydrocortisone to 20 mg twice daily for now and obtain endocrine evaluation

## 2023-01-09 ENCOUNTER — APPOINTMENT (INPATIENT)
Dept: NON INVASIVE DIAGNOSTICS | Facility: HOSPITAL | Age: 68
End: 2023-01-09
Attending: INTERNAL MEDICINE

## 2023-01-09 ENCOUNTER — APPOINTMENT (INPATIENT)
Dept: NON INVASIVE DIAGNOSTICS | Facility: HOSPITAL | Age: 68
End: 2023-01-09

## 2023-01-09 LAB
ANION GAP SERPL CALCULATED.3IONS-SCNC: 4 MMOL/L (ref 4–13)
APICAL FOUR CHAMBER EJECTION FRACTION: 69 %
BASOPHILS # BLD AUTO: 0.02 THOUSANDS/ÂΜL (ref 0–0.1)
BASOPHILS NFR BLD AUTO: 0 % (ref 0–1)
BUN SERPL-MCNC: 13 MG/DL (ref 5–25)
CALCIUM SERPL-MCNC: 9 MG/DL (ref 8.3–10.1)
CHLORIDE SERPL-SCNC: 109 MMOL/L (ref 96–108)
CO2 SERPL-SCNC: 27 MMOL/L (ref 21–32)
CREAT SERPL-MCNC: 0.94 MG/DL (ref 0.6–1.3)
E WAVE DECELERATION TIME: 178 MS
EOSINOPHIL # BLD AUTO: 0.09 THOUSAND/ÂΜL (ref 0–0.61)
EOSINOPHIL NFR BLD AUTO: 1 % (ref 0–6)
ERYTHROCYTE [DISTWIDTH] IN BLOOD BY AUTOMATED COUNT: 15.9 % (ref 11.6–15.1)
GFR SERPL CREATININE-BSD FRML MDRD: 84 ML/MIN/1.73SQ M
GLUCOSE SERPL-MCNC: 124 MG/DL (ref 65–140)
GLUCOSE SERPL-MCNC: 127 MG/DL (ref 65–140)
GLUCOSE SERPL-MCNC: 132 MG/DL (ref 65–140)
GLUCOSE SERPL-MCNC: 205 MG/DL (ref 65–140)
GLUCOSE SERPL-MCNC: 207 MG/DL (ref 65–140)
HCT VFR BLD AUTO: 32.3 % (ref 36.5–49.3)
HGB BLD-MCNC: 10.4 G/DL (ref 12–17)
IMM GRANULOCYTES # BLD AUTO: 0.04 THOUSAND/UL (ref 0–0.2)
IMM GRANULOCYTES NFR BLD AUTO: 1 % (ref 0–2)
LYMPHOCYTES # BLD AUTO: 1.33 THOUSANDS/ÂΜL (ref 0.6–4.47)
LYMPHOCYTES NFR BLD AUTO: 16 % (ref 14–44)
MCH RBC QN AUTO: 28.7 PG (ref 26.8–34.3)
MCHC RBC AUTO-ENTMCNC: 32.2 G/DL (ref 31.4–37.4)
MCV RBC AUTO: 89 FL (ref 82–98)
MONOCYTES # BLD AUTO: 0.93 THOUSAND/ÂΜL (ref 0.17–1.22)
MONOCYTES NFR BLD AUTO: 11 % (ref 4–12)
MV E'TISSUE VEL-SEP: 6 CM/S
MV PEAK A VEL: 1.15 M/S
MV PEAK E VEL: 98 CM/S
MV STENOSIS PRESSURE HALF TIME: 52 MS
MV VALVE AREA P 1/2 METHOD: 4.23 CM2
NEUTROPHILS # BLD AUTO: 5.76 THOUSANDS/ÂΜL (ref 1.85–7.62)
NEUTS SEG NFR BLD AUTO: 71 % (ref 43–75)
NRBC BLD AUTO-RTO: 0 /100 WBCS
PLATELET # BLD AUTO: 228 THOUSANDS/UL (ref 149–390)
PMV BLD AUTO: 11.7 FL (ref 8.9–12.7)
POTASSIUM SERPL-SCNC: 4.1 MMOL/L (ref 3.5–5.3)
RBC # BLD AUTO: 3.63 MILLION/UL (ref 3.88–5.62)
SL CV LV EF: 65
SODIUM SERPL-SCNC: 140 MMOL/L (ref 135–147)
WBC # BLD AUTO: 8.17 THOUSAND/UL (ref 4.31–10.16)

## 2023-01-09 RX ORDER — HYDROCORTISONE 20 MG/1
20 TABLET ORAL EVERY MORNING
Status: DISCONTINUED | OUTPATIENT
Start: 2023-01-10 | End: 2023-01-12

## 2023-01-09 RX ADMIN — INSULIN LISPRO 7 UNITS: 100 INJECTION, SOLUTION INTRAVENOUS; SUBCUTANEOUS at 11:37

## 2023-01-09 RX ADMIN — POTASSIUM CHLORIDE 20 MEQ: 750 TABLET, EXTENDED RELEASE ORAL at 08:32

## 2023-01-09 RX ADMIN — ASPIRIN 81 MG: 81 TABLET, CHEWABLE ORAL at 08:33

## 2023-01-09 RX ADMIN — RISPERIDONE 1 MG: 1 TABLET ORAL at 08:32

## 2023-01-09 RX ADMIN — VITAM B12 100 MCG: 100 TAB at 08:32

## 2023-01-09 RX ADMIN — HYDROCORTISONE 20 MG: 20 TABLET ORAL at 08:33

## 2023-01-09 RX ADMIN — MIDODRINE HYDROCHLORIDE 10 MG: 5 TABLET ORAL at 06:31

## 2023-01-09 RX ADMIN — INSULIN GLARGINE 20 UNITS: 100 INJECTION, SOLUTION SUBCUTANEOUS at 21:12

## 2023-01-09 RX ADMIN — INSULIN LISPRO 7 UNITS: 100 INJECTION, SOLUTION INTRAVENOUS; SUBCUTANEOUS at 16:25

## 2023-01-09 RX ADMIN — MIDODRINE HYDROCHLORIDE 10 MG: 5 TABLET ORAL at 16:24

## 2023-01-09 RX ADMIN — SERTRALINE 100 MG: 100 TABLET, FILM COATED ORAL at 08:32

## 2023-01-09 RX ADMIN — APIXABAN 5 MG: 5 TABLET, FILM COATED ORAL at 17:16

## 2023-01-09 RX ADMIN — FLUDROCORTISONE ACETATE 0.2 MG: 0.1 TABLET ORAL at 08:33

## 2023-01-09 RX ADMIN — INSULIN LISPRO 7 UNITS: 100 INJECTION, SOLUTION INTRAVENOUS; SUBCUTANEOUS at 08:36

## 2023-01-09 RX ADMIN — APIXABAN 5 MG: 5 TABLET, FILM COATED ORAL at 08:33

## 2023-01-09 RX ADMIN — MIDODRINE HYDROCHLORIDE 10 MG: 5 TABLET ORAL at 11:40

## 2023-01-09 RX ADMIN — INSULIN LISPRO 1 UNITS: 100 INJECTION, SOLUTION INTRAVENOUS; SUBCUTANEOUS at 16:24

## 2023-01-09 RX ADMIN — CALCITRIOL CAPSULES 0.25 MCG 0.25 MCG: 0.25 CAPSULE ORAL at 08:32

## 2023-01-09 RX ADMIN — POTASSIUM CHLORIDE 20 MEQ: 750 TABLET, EXTENDED RELEASE ORAL at 17:16

## 2023-01-09 RX ADMIN — INSULIN LISPRO 1 UNITS: 100 INJECTION, SOLUTION INTRAVENOUS; SUBCUTANEOUS at 11:37

## 2023-01-09 RX ADMIN — ATORVASTATIN CALCIUM 10 MG: 10 TABLET, FILM COATED ORAL at 08:33

## 2023-01-09 RX ADMIN — RISPERIDONE 1 MG: 1 TABLET ORAL at 17:16

## 2023-01-09 NOTE — CASE MANAGEMENT
Case Management Assessment & Discharge Planning Note    Patient name Thomas Velazco  Location [facility] 831/[facility] 797-06 MRN [id number]  : 1955 Date 2023       Current Admission Date: 2023  Current Admission Diagnosis:Syncope   Patient Active Problem List    Diagnosis Date Noted   • Syncope 2023   • Acute encephalopathy 2022   • Unspecified protein-calorie malnutrition ([facility] [address] ) 2022   • Unintentional weight loss 2022   • Type 2 MI (myocardial infarction) ([facility] [address] ) 2022   • Orthostatic hypotension 2022   • Recurrent hypoglycemia 10/19/2022   • Insulin dependent type 1 diabetes mellitus  10/19/2022   • Adrenal insufficiency  10/19/2022   • Essential hypertension 10/19/2022   • Paroxysmal atrial fibrillation  10/19/2022   • History of stroke 10/19/2022   • Anemia of chronic disease 10/19/2022   • Psychiatric disorder 10/19/2022      LOS (days): 2  Geometric Mean LOS (GMLOS) (days):   Days to GMLOS:     OBJECTIVE:  PATIENT READMITTED TO HOSPITAL  Risk of Unplanned Readmission Score: 22 53         Current admission status: Inpatient       Preferred Pharmacy:   [address] [place] [address] [place] [address] [facility] Ramila RADHA Ayushmalindy [address] [place] [address]  Phone: [phone] Fax: [phone] [address] [place] [address]  Phone: [phone] Fax: [phone]    Primary Care Provider: Chinedu Krueger MD    Primary Insurance: [facility]  Secondary Insurance:     ASSESSMENT:  [place] [address] Proxies    There are no active Health Care Proxies on file         Advance Directives  Does patient have a [address]?: No  Does patient have Advance Directives?: No              Patient Information  Admitted from[de-identified] Home  Mental Status: Alert  Support Systems: Family members  Living Arrangements: Lives w/ Extended Family, Lives w/ Spouse/significant other                        Means of Transportation  Means of Transport to Appts[de-identified] Family transport        DISCHARGE DETAILS:    Discharge planning discussed with[de-identified] pt  Freedom of Choice: Yes                   Contacts  Patient Contacts: Sissy Kawasaki  Relationship to Patient[de-identified] Family  Contact Method: Phone  Reason/Outcome: Continuity of Care, Emergency Contact, Discharge Planning                   Would you like to participate in our [address] service program?  : No - Declined     Readmit    A post acute care recommendation was made by your care team for STR  Discussed Freedom of Choice with patient  List of facilities given to patient via in person  patient aware the list is custom filtered for them by zip code location and that [facility] post acute providers are designated  [place] referrals placed in [place]    Met with pt, advised [facility] accepted  He would like [place]    Same [facility] reviewed and signed

## 2023-01-09 NOTE — PROGRESS NOTES
Reny Stevens 14[address] 1955, 74 y o  male MRN: [id number]  Unit/Bed#: [facility] 831-01 Encounter: [id number]  Primary Care Provider: Genet Sequeira MD   Date and time admitted to hospital: 1/6/2023 10:30 AM    * Syncope  Assessment & Plan  · Syncope/near syncope x2-3  · Also had another similar episode this morning while working with physical therapy which was likely related to orthostatic hypotension as well and his orthostatic vitals were positive for hypotension     · Most likely suspect secondary to orthostatic hypotension only  · EKG - NSR  Troponin negative  · Ct head: No acute intracranial CT abnormality  Stable chronic bilateral gangliocapsular infarcts  Chronic microangiopathy  Right maxillary sinus mucosal thickening containing hyperattenuating foci may indicate inspissated secretions versus fungal colonization  · Lost 4 lb in few days - ? If that contributing  He is scheduled to get EGD and colonoscopy end of this month  · Echo shows ef of 65% with grade 1 DD and no aortic stenosis,    PLAN  · Cardiology following  · Hold metoprolol for now  · Continue current dose of Florinef and midodrine  · PT OT evaluation>> recommend rehab    Orthostatic hypotension  Assessment & Plan  · History of such  · Current home regimen -Florinef 0 2 daily, hydrocortisone 15 mg in a m  and 10 mg in p m , midodrine 10 mg 3 times daily  · Given persistent and recurrent orthostatic hypotension, will increase hydrocortisone to 20 mg twice daily for now and await endocrine evaluation    · Seems like despite this regimen he was having orthostatic hypotension and near syncope  · Has had lost 4 pounds in a few days  · For now we will continue the bowel regimen, apply stockings, abdominal binder and check orthostatics qshift  · Cardiology follwoing  · IVF    Psychiatric disorder  Assessment & Plan  · Continue Risperidone and Zoloft at home dose    Anemia of chronic disease  Assessment & Plan  · Baseline seems to be between 8-10  · currently at baseline      History of stroke  Assessment & Plan  · Aspirin, statin, Eliquis    Paroxysmal atrial fibrillation   Assessment & Plan  · Hold metoprolol due to severe orthostatic hypotension  · Continue Eliquis    Adrenal insufficiency   Assessment & Plan  · Labs with endocrine outpatient  · Continue Florinef 0 2 mg daily  · On hydrocortisone 50 mg in a m  and 10 mg in evening  Given persistent and recurrent orthostatic hypotension, will increase hydrocortisone to 20 mg twice daily for now and obtain endocrine evaluation  Insulin dependent type 1 diabetes mellitus   Assessment & Plan  Lab Results   Component Value Date    HGBA1C 7 5 (H) 07/22/2022       Recent Labs     01/07/23  1602 01/07/23  2109 01/08/23  0839 01/08/23  1127   POCGLU 157* 104 270* 220*       Blood Sugar Average: Last 72 hrs:  (P) 492 [id number]     ·  home regimen of Lantus 18 units at bedtime, Humalog 6 units 3 times daily  · Increased dose slightly given hyperglycemia  · Continue ISS  · Follow-up with endocrine OP  · Hypoglycemia protocol        VTE Pharmacologic Prophylaxis:   Pharmacologic: Apixaban (Eliquis)  Mechanical VTE Prophylaxis in Place: Yes    Patient Centered Rounds: I have performed bedside rounds with nursing staff today  Discussions with Specialists or Other Care Team Provider:     Education and Discussions with Family / Patient: Plan of care with patient and also called and updated his daughter    Time Spent for Care: 30 minutes  More than 50% of total time spent on counseling and coordination of care as described above      Current Length of Stay: 2 day(s)    Current Patient Status: Inpatient   Certification Statement: The patient will continue to require additional inpatient hospital stay due to Not medically stable due to orthostatic hypotension pending endocrine evaluation    Discharge Plan: rehab when stable, likely in the next 24 to 48 hours    Code Status: Level 1 - Full Code      Subjective:   No overnight events  She still with positive orthostatic vitals for hypotension  No associated symptoms  Denies any acute pain or discomfort  Objective:     Vitals:   Temp (24hrs), Av 4 °F (36 3 °C), Min:97 °F (36 1 °C), Max:97 7 °F (36 5 °C)    Temp:  [97 °F (36 1 °C)-97 7 °F (36 5 °C)] 97 5 °F (36 4 °C)  HR:  [73-80] 78  Resp:  [16] 16  BP: ()/(48-86) 174/80  SpO2:  [96 %-99 %] 99 %  Body mass index is 31 76 kg/m²  Input and Output Summary (last 24 hours): Intake/Output Summary (Last 24 hours) at 2023 1647  Last data filed at 2023 0854  Gross per 24 hour   Intake 1673 34 ml   Output 2075 ml   Net -401 66 ml       Physical Exam:     Physical Exam  Constitutional:       General: He is not in acute distress  Cardiovascular:      Rate and Rhythm: Normal rate and regular rhythm       Heart sounds: Normal heart sounds  No murmur heard  Pulmonary:      Effort: No respiratory distress       Breath sounds: Normal breath sounds  No wheezing or rales  Abdominal:      General: Bowel sounds are normal  There is no distension       Palpations: Abdomen is soft       Tenderness: There is no abdominal tenderness  Skin:     General: Skin is warm     Neurological:      Mental Status: He is alert       Comments: Awake alert and communicative  Moves all extremities    Additional Data:     Labs:    Results from last 7 days   Lab Units 23  0604   WBC Thousand/uL 8 17   HEMOGLOBIN g/dL 10 4*   HEMATOCRIT % 32 3*   PLATELETS Thousands/uL 228   NEUTROS PCT % 71   LYMPHS PCT % 16   MONOS PCT % 11   EOS PCT % 1     Results from last 7 days   Lab Units 23  0604 23  0526 23  1053   SODIUM mmol/L 140   < > 140   POTASSIUM mmol/L 4 1   < > 3 7   CHLORIDE mmol/L 109*   < > 112*   CO2 mmol/L 27   < > 25   BUN mg/dL 13   < > 18   CREATININE mg/dL 0 94   < > 1 26   ANION GAP mmol/L 4   < > 3*   CALCIUM mg/dL 9 0   < > 8 5   ALBUMIN g/dL  --   --  3 1*   TOTAL BILIRUBIN mg/dL  --   --  0 32   ALK PHOS U/L  --   --  76   ALT U/L  --   --  13   AST U/L  --   --  8   GLUCOSE RANDOM mg/dL 132   < > 123    < > = values in this interval not displayed  Results from last 7 days   Lab Units 01/09/23  1622 01/09/23  1124 01/09/23  0750 01/08/23  2035 01/08/23  1651 01/08/23  1127 01/08/23  0839 01/07/23  2109 01/07/23  1602 01/07/23  1037 01/07/23  0536 01/06/23  2239   POC GLUCOSE mg/dl 207* 205* 127 209* 191* 220* 270* 104 157* 253* 117 88                   Imaging:    CT head wo contrast   Final Result by Mari Alamo MD (01/06 1534)      1  No acute intracranial CT abnormality  2   Stable chronic bilateral gangliocapsular infarcts  Chronic microangiopathy  3   Right maxillary sinus mucosal thickening containing hyperattenuating foci may indicate inspissated secretions versus fungal colonization                    Workstation performed: [id number]               Recent Cultures (last 7 days):           Last 24 Hours Medication List:   Current Facility-Administered Medications   Medication Dose Route Frequency Provider Last Rate   • apixaban  5 mg Oral BID Elio Gómez MD     • aspirin  81 mg Oral Daily Elio Gómez MD     • atorvastatin  10 mg Oral Daily Elio Gómez MD     • calcitriol  0 25 mcg Oral Daily Elio Gómez MD     • cyanocobalamin  100 mcg Oral Daily Elio Gómez MD     • fludrocortisone  0 2 mg Oral Daily Elio Gómez MD     • [START ON 1/10/2023] hydrocortisone  15 mg Oral Q24H Ziauwatomisin Micky Philip MD     • [START ON 1/10/2023] hydrocortisone  20 mg Oral QAM Raheemmisin Micky Philip MD     • insulin glargine  20 Units Subcutaneous HS Radha Saunders MD     • insulin lispro  1-5 Units Subcutaneous TID AC Sue Pedro MD     • insulin lispro  1-5 Units Subcutaneous HS Elio Gómez MD     • insulin lispro  7 Units Subcutaneous TID With Meals Marcelina Ruiz MD     • midodrine  10 mg Oral TID AC Charmaine Real MD     • potassium chloride  20 mEq Oral BID Charmaine Real MD     • risperiDONE  1 mg Oral BID Charmaine Real MD     • sertraline  100 mg Oral Daily Charmaine Real MD          Today, Patient Was Seen By: Marcelina Ruiz MD    ** Please Note: Dictation voice to text software may have been used in the creation of this document   **

## 2023-01-09 NOTE — CONSULTS
Consultation - Moris Jiménez 74 y o  male MRN: [id number]    Unit/Bed#: [facility] 831-01 Encounter: [id number]      Assessment/Plan     Assessment: This is a 74y o -year-old male with type 1 DM with brittle sugars, adrenal insufficiency admitted for evaluation of syncope  Plan:  #T1DM  A1c 7 5  Home regimen Lantus 18 units daily at bedtime, Humalog 6 units 3 times daily with meals  Inpatient regimen Lantus 20 units daily at bedtime and Humalog 7 units 3 times daily with meals  Recommendations: Recommend to continue on current regimen, continue to monitor sugars with Accu-Cheks and adjust insulin regimen as indicated  #Adrenal insufficiency  History of secondary adrenal insufficiency on home hydrocortisone 15 mg every morning and 10 mg every afternoon  Also on Florinef 0 2 mg daily  Episodes of syncope and labile blood pressure likely unrelated to adrenal insufficiency- more likely from autonomic neuropathy  Also Bp remained labile despite increased dose of hydrocortisone on admission  Recommend to decrease hydrocortisone to 20 mg QAM and 15 mg qPM      CC: Diabetes and Adrenal insufficiency Consult    History of Present Illness     HPI: Moris Jiménez is a 74y o  year old male with type 1 DM complicated by brittle diabetes, CVA and neuropathy, adrenal insufficiency, orthostatic hypotension admitted to the hospital on 01/06 following episodes of syncope at home with concerns for worsening orthostatic hypotension  Home hydrocortisone was increased to 20 mg twice daily on admission, he was also started on midodrine with labile blood pressures (ranging between 90s/40s to 170s/80s) since admission  Also currently on basal bolus insulin regimen with no recent hypoglycemia on chart  On evaluation, patient reports feeling okay  Denies any dizziness or lightheadedness  Tolerating diet  Follows with endocrinology at outpatient, last office visit was in December 2022       Consults    ROS  Constitutional: Negative for appetite change  Negative for activity change  Respiratory: Negative for shortness of breath, wheezing, cough  Cardiovascular: Negative for chest pain and palpitations  Gastrointestinal: Negative for abdominal pain, nausea and vomiting  Negative for diarrhea or constipation  Musculoskeletal: Negative for arthralgias  Neurological: Negative for dizziness, light-headedness and headaches  All other ROS reviewed and negative  Historical Information   Past Medical History:   Diagnosis Date   • Diabetes mellitus ([facility] )    • Obesity, morbid ([facility] ) 11/14/2022     History reviewed  No pertinent surgical history  Social History   Social History     Substance and Sexual Activity   Alcohol Use Not Currently   • Alcohol/week: 5 0 standard drinks   • Types: 5 Cans of beer per week    Comment: Quit in august     Social History     Substance and Sexual Activity   Drug Use Never     Social History     Tobacco Use   Smoking Status Former   • Packs/day: 0 25   • Years: 30 00   • Pack years: 7 50   • Types: Cigarettes   Smokeless Tobacco Never     Family History: History reviewed  No pertinent family history      Meds/Allergies   Current Facility-Administered Medications   Medication Dose Route Frequency Provider Last Rate Last Admin   • apixaban (ELIQUIS) tablet 5 mg  5 mg Oral BID Kerri Tsai MD   5 mg at 01/09/23 1202   • aspirin chewable tablet 81 mg  81 mg Oral Daily Kerri Tsai MD   81 mg at 01/09/23 8957   • atorvastatin (LIPITOR) tablet 10 mg  10 mg Oral Daily Kerri Tsai MD   10 mg at 01/09/23 0477   • calcitriol (ROCALTROL) capsule 0 25 mcg  0 25 mcg Oral Daily Kerri Tsai MD   0 25 mcg at 01/09/23 9693   • cyanocobalamin (VITAMIN B-12) tablet 100 mcg  100 mcg Oral Daily Kerri Tsai MD   100 mcg at 01/09/23 1025   • fludrocortisone (FLORINEF) tablet 0 2 mg  0 2 mg Oral Daily Kerri Tsai MD   0 2 mg at 01/09/23 0833   • [START ON 1/10/2023] hydrocortisone (CORTEF) tablet 15 mg 15 mg Oral Q24H Oluwatomisin Omar Varghese MD       • [START ON 1/10/2023] hydrocortisone (CORTEF) tablet 20 mg  20 mg Oral QAM Citlalli Cruz MD       • insulin glargine (LANTUS) subcutaneous injection 20 Units 0 2 mL  20 Units Subcutaneous HS Vincent Kruger MD   20 Units at 01/08/23 2133   • insulin lispro (HumaLOG) 100 units/mL subcutaneous injection 1-5 Units  1-5 Units Subcutaneous TID  Sue Saba MD   1 Units at 01/09/23 1624   • insulin lispro (HumaLOG) 100 units/mL subcutaneous injection 1-5 Units  1-5 Units Subcutaneous HS Olimpia Griffith MD   1 Units at 01/08/23 2133   • insulin lispro (HumaLOG) 100 units/mL subcutaneous injection 7 Units  7 Units Subcutaneous TID With Meals Vincent Kruger MD   7 Units at 01/09/23 1625   • midodrine (PROAMATINE) tablet 10 mg  10 mg Oral TID  Sue Saba MD   10 mg at 01/09/23 1624   • potassium chloride (K-DUR,KLOR-CON) CR tablet 20 mEq  20 mEq Oral BID Olimpia Griffith MD   20 mEq at 01/09/23 3123   • risperiDONE (RisperDAL) tablet 1 mg  1 mg Oral BID Olimpia Griffith MD   1 mg at 01/09/23 1231   • sertraline (ZOLOFT) tablet 100 mg  100 mg Oral Daily Olimpia Griffith MD   100 mg at 01/09/23 0451     Allergies   Allergen Reactions   • Imipramine Other (See Comments)   • Lisinopril Other (See Comments)   • Paroxetine Other (See Comments)   • Penicillins Hives   • Rosiglitazone Other (See Comments)   • Troglitazone Other (See Comments)       Objective   Vitals: Blood pressure (!) 174/80, pulse 78, temperature 97 5 °F (36 4 °C), resp  rate 16, height 5' 4" (1 626 m), weight 83 9 kg (185 lb), SpO2 99 %      Intake/Output Summary (Last 24 hours) at 1/9/2023 1651  Last data filed at 1/9/2023 0854  Gross per 24 hour   Intake 1673 34 ml   Output 2075 ml   Net -401 66 ml     Invasive Devices     Peripheral Intravenous Line  Duration           Peripheral IV 01/08/23 Left Forearm <1 day                Physical Exam   Physical exam: Constitutional:Oriented to person and place  Appears well-developed and well-nourished  Not in any acute distress  HENT:   Head: Normocephalic and atraumatic  Neck: Normal range of motion  Supple, No thyromegaly  Pulmonary/Chest: Effort normal/ breathing comfortably on room air  CTAB   CVS:  Regular rate and rhythm, S1-S2 +  Abdomen: soft, nondistended, nontender  Musculoskeletal: Normal range of motion  Skin:  Warm, no rash  Extremities:  No pedal edema  Psychiatric: Normal mood and affect  Behavior is normal        The history was obtained from the review of the chart, patient  Lab Results:       Lab Results   Component Value Date    WBC 8 17 01/09/2023    HGB 10 4 (L) 01/09/2023    HCT 32 3 (L) 01/09/2023    MCV 89 01/09/2023     01/09/2023     Lab Results   Component Value Date/Time    BUN 13 01/09/2023 06:04 AM    K 4 1 01/09/2023 06:04 AM     (H) 01/09/2023 06:04 AM    CO2 27 01/09/2023 06:04 AM    CO2 33 (H) 10/19/2022 10:19 PM    CREATININE 0 94 01/09/2023 06:04 AM    AST 8 01/06/2023 10:53 AM    ALT 13 01/06/2023 10:53 AM    ALB 3 1 (L) 01/06/2023 10:53 AM       POC Glucose (mg/dl)   Date Value   01/09/2023 207 (H)   01/09/2023 205 (H)   01/09/2023 127   01/08/2023 209 (H)   01/08/2023 191 (H)   01/08/2023 220 (H)   01/08/2023 270 (H)   01/07/2023 104   01/07/2023 157 (H)   01/07/2023 253 (H)       Imaging Studies: I have personally reviewed pertinent reports  Portions of the record may have been created with voice recognition software

## 2023-01-09 NOTE — PROGRESS NOTES
Cardiology Progress Note - Óscar Drummond 74 y o  male MRN: [id number]    Unit/Bed#: [facility] 831-01 Encounter: [id number]    Hospital Problems:  Principal Problem:    Syncope  Active Problems:    Insulin dependent type 1 diabetes mellitus     Adrenal insufficiency     Paroxysmal atrial fibrillation     History of stroke    Anemia of chronic disease    Psychiatric disorder    Orthostatic hypotension      Assessment & Plan:        57-year-old male with paroxysmal atrial fibrillation, prior CVA, adrenal insufficiency complicated by chronic hypertension and orthostatic hypotension, diabetes, who presented with exacerbation of orthostatic hypotension in the setting of metoprolol use for atrial fibrillation  #1 orthostatic hypotension, autonomic insufficiency and adrenal insufficiency  - This is not primarily a cardiac problem  - Agree with continuing current dose of midodrine, Florinef, and binders  - Hydrocortisone as per primary team and endocrinology  - would not continue metoprolol for atrial fibrillation, see below    #2  Paroxysmal atrial fibrillation -intolerant intolerant of AV kenroy blockers due to orthostatic hypotension  - Continue Eliquis for thromboembolic prophylaxis   - withhold BB -- can trial digoxin if needed for rate control given intolerance of beta blockers, would likely not tolerate CCB         We will sign off  Please call with questions  Subjective:   Patient seen and examined  No significant events overnight  Patient seems to have very little understanding of his medications  Is unsure what metoprolol is and if he takes it  Objective:     Vitals: Blood pressure (!) 95/48, pulse 77, temperature (!) 97 2 °F (36 2 °C), resp  rate 16, height 5' 4" (1 626 m), weight 83 9 kg (185 lb), SpO2 97 %  , Body mass index is 31 76 kg/m² ,   Orthostatic Blood Pressures    Flowsheet Row Most Recent Value   Blood Pressure 95/48 filed at 01/09/2023 1050   Patient Position - Orthostatic VS Standing filed at 01/09/2023 1050            Intake/Output Summary (Last 24 hours) at 1/9/2023 1356  Last data filed at 1/9/2023 0854  Gross per 24 hour   Intake 1673 34 ml   Output 2475 ml   Net -801 66 ml         Physical Exam:    General: Scott Norris is a well appearing male, in no acute distress, sitting comfortably  HEENT: moist mucous membranes, EOMI  Neck:  No JVD, supple, trachea midline  Cardiovascular: unremarkable S1/S2, regular rate and rhythm, no murmurs, rubs or gallops  Pulmonary: normal respiratory effort, CTAB  Abdomen: soft and nondistended  Extremities: No lower extremity edema  Warm and well perfused extremities    Neuro: no focal motor deficits, AAOx3 (person, place, time)  Psych: Normal mood and affect, cooperative      Medications:      Current Facility-Administered Medications:   •  apixaban (ELIQUIS) tablet 5 mg, 5 mg, Oral, BID, Sue Pedro MD, 5 mg at 01/09/23 0356  •  aspirin chewable tablet 81 mg, 81 mg, Oral, Daily, Sue Pedro MD, 81 mg at 01/09/23 5925  •  atorvastatin (LIPITOR) tablet 10 mg, 10 mg, Oral, Daily, Sue Pedro MD, 10 mg at 01/09/23 1573  •  calcitriol (ROCALTROL) capsule 0 25 mcg, 0 25 mcg, Oral, Daily, Sue Pedro MD, 0 25 mcg at 01/09/23 0241  •  cyanocobalamin (VITAMIN B-12) tablet 100 mcg, 100 mcg, Oral, Daily, Sue Pedro MD, 100 mcg at 01/09/23 7791  •  fludrocortisone (FLORINEF) tablet 0 2 mg, 0 2 mg, Oral, Daily, Sue Pedro MD, 0 2 mg at 01/09/23 0660  •  hydrocortisone (CORTEF) tablet 20 mg, 20 mg, Oral, BID, Jeff Conway MD, 20 mg at 01/09/23 1523  •  insulin glargine (LANTUS) subcutaneous injection 20 Units 0 2 mL, 20 Units, Subcutaneous, HS, Jeff Conway MD, 20 Units at 01/08/23 2133  •  insulin lispro (HumaLOG) 100 units/mL subcutaneous injection 1-5 Units, 1-5 Units, Subcutaneous, TILOEG DILL, 1 Units at 01/09/23 1137 **AND** Fingerstick Glucose (POCT), , , TID ANIYAH, Sue Pedro MD  •  insulin lispro (HumaLOG) 100 units/mL subcutaneous injection 1-5 Units, 1-5 Units, Subcutaneous, HS, Queen Andreas MD, 1 Units at 01/08/23 2133  •  insulin lispro (HumaLOG) 100 units/mL subcutaneous injection 7 Units, 7 Units, Subcutaneous, TID With Meals, Onofre Seo MD, 7 Units at 01/09/23 1137  •  midodrine (PROAMATINE) tablet 10 mg, 10 mg, Oral, TID AC, Sue Pedro MD, 10 mg at 01/09/23 1140  •  potassium chloride (K-DUR,KLOR-CON) CR tablet 20 mEq, 20 mEq, Oral, BID, Queen Andreas MD, 20 mEq at 01/09/23 1536  •  risperiDONE (RisperDAL) tablet 1 mg, 1 mg, Oral, BID, Queen Andreas MD, 1 mg at 01/09/23 1644  •  sertraline (ZOLOFT) tablet 100 mg, 100 mg, Oral, Daily, Queen Andreas MD, 100 mg at 01/09/23 0832     Labs & Results:        Results from last 7 days   Lab Units 01/09/23  0604 01/08/23  0526 01/07/23  0737   WBC Thousand/uL 8 17 8 74 8 57   HEMOGLOBIN g/dL 10 4* 9 9* 10 5*   HEMATOCRIT % 32 3* 33 0* 33 6*   PLATELETS Thousands/uL 228 197 218         Results from last 7 days   Lab Units 01/09/23  0604 01/08/23  0526 01/06/23  1053   POTASSIUM mmol/L 4 1 4 2 3 7   CHLORIDE mmol/L 109* 109* 112*   CO2 mmol/L 27 25 25   BUN mg/dL 13 15 18   CREATININE mg/dL 0 94 0 98 1 26   CALCIUM mg/dL 9 0 8 8 8 5   ALK PHOS U/L  --   --  76   ALT U/L  --   --  13   AST U/L  --   --  8                        This note was completed in part utilizing [employer] direct voice recognition software  Grammatical errors, random word insertion, spelling mistakes, occasional wrong word or "sound-alike" substitutions and incomplete sentences may be an occasional consequence of the system secondary to software limitations, ambient noise and hardware issues  At the time of dictation, efforts were made to edit, clarify and /or correct errors  Please read the chart carefully and recognize, using context, where substitutions have occurred    If you have any questions or concerns about the context, text or information contained within the body of this dictation, please contact myself, the provider, for further clarification

## 2023-01-10 LAB
ANION GAP SERPL CALCULATED.3IONS-SCNC: 5 MMOL/L (ref 4–13)
BUN SERPL-MCNC: 16 MG/DL (ref 5–25)
CALCIUM SERPL-MCNC: 8.7 MG/DL (ref 8.3–10.1)
CHLORIDE SERPL-SCNC: 111 MMOL/L (ref 96–108)
CO2 SERPL-SCNC: 28 MMOL/L (ref 21–32)
CREAT SERPL-MCNC: 0.96 MG/DL (ref 0.6–1.3)
ERYTHROCYTE [DISTWIDTH] IN BLOOD BY AUTOMATED COUNT: 16.1 % (ref 11.6–15.1)
GFR SERPL CREATININE-BSD FRML MDRD: 82 ML/MIN/1.73SQ M
GLUCOSE SERPL-MCNC: 114 MG/DL (ref 65–140)
GLUCOSE SERPL-MCNC: 134 MG/DL (ref 65–140)
GLUCOSE SERPL-MCNC: 176 MG/DL (ref 65–140)
GLUCOSE SERPL-MCNC: 178 MG/DL (ref 65–140)
GLUCOSE SERPL-MCNC: 237 MG/DL (ref 65–140)
HCT VFR BLD AUTO: 31.9 % (ref 36.5–49.3)
HGB BLD-MCNC: 9.9 G/DL (ref 12–17)
MCH RBC QN AUTO: 28 PG (ref 26.8–34.3)
MCHC RBC AUTO-ENTMCNC: 31 G/DL (ref 31.4–37.4)
MCV RBC AUTO: 90 FL (ref 82–98)
PLATELET # BLD AUTO: 191 THOUSANDS/UL (ref 149–390)
PMV BLD AUTO: 11.4 FL (ref 8.9–12.7)
POTASSIUM SERPL-SCNC: 3.8 MMOL/L (ref 3.5–5.3)
RBC # BLD AUTO: 3.53 MILLION/UL (ref 3.88–5.62)
SODIUM SERPL-SCNC: 144 MMOL/L (ref 135–147)
WBC # BLD AUTO: 7.66 THOUSAND/UL (ref 4.31–10.16)

## 2023-01-10 RX ORDER — INSULIN GLARGINE 100 [IU]/ML
18 INJECTION, SOLUTION SUBCUTANEOUS
Status: DISCONTINUED | OUTPATIENT
Start: 2023-01-10 | End: 2023-01-11

## 2023-01-10 RX ORDER — HYDROCORTISONE 10 MG/1
10 TABLET ORAL EVERY 24 HOURS
Status: DISCONTINUED | OUTPATIENT
Start: 2023-01-11 | End: 2023-01-13 | Stop reason: HOSPADM

## 2023-01-10 RX ADMIN — FLUDROCORTISONE ACETATE 0.2 MG: 0.1 TABLET ORAL at 09:18

## 2023-01-10 RX ADMIN — HYDROCORTISONE 15 MG: 20 TABLET ORAL at 14:07

## 2023-01-10 RX ADMIN — HYDROCORTISONE 20 MG: 20 TABLET ORAL at 09:18

## 2023-01-10 RX ADMIN — MIDODRINE HYDROCHLORIDE 10 MG: 5 TABLET ORAL at 12:27

## 2023-01-10 RX ADMIN — SERTRALINE 100 MG: 100 TABLET, FILM COATED ORAL at 09:17

## 2023-01-10 RX ADMIN — APIXABAN 5 MG: 5 TABLET, FILM COATED ORAL at 17:01

## 2023-01-10 RX ADMIN — INSULIN LISPRO 7 UNITS: 100 INJECTION, SOLUTION INTRAVENOUS; SUBCUTANEOUS at 09:17

## 2023-01-10 RX ADMIN — ASPIRIN 81 MG: 81 TABLET, CHEWABLE ORAL at 09:17

## 2023-01-10 RX ADMIN — MIDODRINE HYDROCHLORIDE 10 MG: 5 TABLET ORAL at 17:01

## 2023-01-10 RX ADMIN — INSULIN LISPRO 2 UNITS: 100 INJECTION, SOLUTION INTRAVENOUS; SUBCUTANEOUS at 17:02

## 2023-01-10 RX ADMIN — VITAM B12 100 MCG: 100 TAB at 09:17

## 2023-01-10 RX ADMIN — MIDODRINE HYDROCHLORIDE 10 MG: 5 TABLET ORAL at 06:31

## 2023-01-10 RX ADMIN — RISPERIDONE 1 MG: 1 TABLET ORAL at 09:17

## 2023-01-10 RX ADMIN — INSULIN LISPRO 7 UNITS: 100 INJECTION, SOLUTION INTRAVENOUS; SUBCUTANEOUS at 12:28

## 2023-01-10 RX ADMIN — INSULIN GLARGINE 18 UNITS: 100 INJECTION, SOLUTION SUBCUTANEOUS at 21:38

## 2023-01-10 RX ADMIN — INSULIN LISPRO 1 UNITS: 100 INJECTION, SOLUTION INTRAVENOUS; SUBCUTANEOUS at 12:28

## 2023-01-10 RX ADMIN — POTASSIUM CHLORIDE 20 MEQ: 750 TABLET, EXTENDED RELEASE ORAL at 09:17

## 2023-01-10 RX ADMIN — POTASSIUM CHLORIDE 20 MEQ: 750 TABLET, EXTENDED RELEASE ORAL at 17:01

## 2023-01-10 RX ADMIN — APIXABAN 5 MG: 5 TABLET, FILM COATED ORAL at 09:17

## 2023-01-10 RX ADMIN — INSULIN LISPRO 1 UNITS: 100 INJECTION, SOLUTION INTRAVENOUS; SUBCUTANEOUS at 21:38

## 2023-01-10 RX ADMIN — INSULIN LISPRO 7 UNITS: 100 INJECTION, SOLUTION INTRAVENOUS; SUBCUTANEOUS at 17:02

## 2023-01-10 RX ADMIN — RISPERIDONE 1 MG: 1 TABLET ORAL at 17:01

## 2023-01-10 RX ADMIN — ATORVASTATIN CALCIUM 10 MG: 10 TABLET, FILM COATED ORAL at 09:17

## 2023-01-10 RX ADMIN — CALCITRIOL CAPSULES 0.25 MCG 0.25 MCG: 0.25 CAPSULE ORAL at 09:17

## 2023-01-10 NOTE — PHYSICAL THERAPY NOTE
Physical Therapy Cancellation Note  Attempted to see patient for physical therapy, spoke with RN, with RN requesting to hold on session secondary to low BP  Will continue to follow up with patient as appropriate for PT       Unice Chan, PTA

## 2023-01-10 NOTE — ASSESSMENT & PLAN NOTE
· Labs with endocrine outpatient  · Continue Florinef 0 2 mg daily  · On hydrocortisone 50 mg in a m  and 10 mg in evening  Given persistent and recurrent orthostatic hypotension, will increase hydrocortisone to 20 mg qam and 15mg qpm with   2mg florinef daily

## 2023-01-10 NOTE — CASE MANAGEMENT
Case Management Discharge Planning Note    Patient name Radha Simon  Location [facility] 831/[facility] 984-61 MRN [id number]  : 1955 Date 1/10/2023       Current Admission Date: 2023  Current Admission Diagnosis:Syncope   Patient Active Problem List    Diagnosis Date Noted   • Syncope 2023   • Acute encephalopathy 2022   • Unspecified protein-calorie malnutrition ([facility] ) 2022   • Unintentional weight loss 2022   • Type 2 MI (myocardial infarction) ([facility] ) 2022   • Orthostatic hypotension 2022   • Recurrent hypoglycemia 10/19/2022   • Insulin dependent type 1 diabetes mellitus  10/19/2022   • Adrenal insufficiency  10/19/2022   • Essential hypertension 10/19/2022   • Paroxysmal atrial fibrillation  10/19/2022   • History of stroke 10/19/2022   • Anemia of chronic disease 10/19/2022   • Psychiatric disorder 10/19/2022      LOS (days): 3  Geometric Mean LOS (GMLOS) (days): 2 30  Days to GMLOS:-0 5     OBJECTIVE:  Risk of Unplanned Readmission Score: 23 01         Current admission status: Inpatient   Preferred Pharmacy:   [address] [place], [place] [address] Ramila Stone [address] [facility]  Phone: [phone] Fax: [phone] [address] [place] [address]  Phone: [phone] Fax: [phone]    Primary Care Provider: Mery Galeas MD    Primary Insurance: [facility] REP  Secondary Insurance:     DISCHARGE DETAILS:    ANTHONY Scott to discuss DCP    Choices for STR--KV, HFM, MV, OO    Unreserved NE

## 2023-01-10 NOTE — QUICK NOTE
Chart reviewed  Vitals stable  Labs reviewed  Recommend decreasing hydrocortisone to 20 mg every morning and 10 mg every afternoon  Will also decrease Lantus to 18 units daily at bedtime, continue current Premeal insulin with correctional coverage

## 2023-01-10 NOTE — PLAN OF CARE
Problem: PAIN - ADULT  Goal: Verbalizes/displays adequate comfort level or baseline comfort level  Description: Interventions:  - Encourage patient to monitor pain and request assistance  - Assess pain using appropriate pain scale  - Administer analgesics based on type and severity of pain and evaluate response  - Implement non-pharmacological measures as appropriate and evaluate response  - Consider cultural and social influences on pain and pain management  - Notify physician/advanced practitioner if interventions unsuccessful or patient reports new pain  Outcome: Progressing     Problem: INFECTION - ADULT  Goal: Absence or prevention of progression during hospitalization  Description: INTERVENTIONS:  - Assess and monitor for signs and symptoms of infection  - Monitor lab/diagnostic results  - Monitor all insertion sites, i e  indwelling lines, tubes, and drains  - Monitor endotracheal if appropriate and nasal secretions for changes in amount and color  - Sandy appropriate cooling/warming therapies per order  - Administer medications as ordered  - Instruct and encourage patient and family to use good hand hygiene technique  - Identify and instruct in appropriate isolation precautions for identified infection/condition  Outcome: Progressing     Problem: DISCHARGE PLANNING  Goal: Discharge to home or other facility with appropriate resources  Description: INTERVENTIONS:  - Identify barriers to discharge w/patient and caregiver  - Arrange for needed discharge resources and transportation as appropriate  - Identify discharge learning needs (meds, wound care, etc )  - Arrange for interpretive services to assist at discharge as needed  - Refer to Case Management Department for coordinating discharge planning if the patient needs post-hospital services based on physician/advanced practitioner order or complex needs related to functional status, cognitive ability, or social support system  Outcome: Progressing Problem: Knowledge Deficit  Goal: Patient/family/caregiver demonstrates understanding of disease process, treatment plan, medications, and discharge instructions  Description: Complete learning assessment and assess knowledge base    Interventions:  - Provide teaching at level of understanding  - Provide teaching via preferred learning methods  Outcome: Progressing     Problem: CARDIOVASCULAR - ADULT  Goal: Maintains optimal cardiac output and hemodynamic stability  Description: INTERVENTIONS:  - Monitor I/O, vital signs and rhythm  - Monitor for S/S and trends of decreased cardiac output  - Administer and titrate ordered vasoactive medications to optimize hemodynamic stability  - Assess quality of pulses, skin color and temperature  - Assess for signs of decreased coronary artery perfusion  - Instruct patient to report change in severity of symptoms  Outcome: Progressing  Goal: Absence of cardiac dysrhythmias or at baseline rhythm  Description: INTERVENTIONS:  - Continuous cardiac monitoring, vital signs, obtain 12 lead EKG if ordered  - Administer antiarrhythmic and heart rate control medications as ordered  - Monitor electrolytes and administer replacement therapy as ordered  Outcome: Progressing

## 2023-01-10 NOTE — ASSESSMENT & PLAN NOTE
Lab Results   Component Value Date    HGBA1C 7 5 (H) 07/22/2022       Recent Labs     01/09/23  1622 01/09/23  2108 01/10/23  0735 01/10/23  1101   POCGLU 207* 124 114 176*       Blood Sugar Average: Last 72 hrs:  (P) 176 [id number]     ·  home regimen of Lantus 18 units at bedtime, Humalog 6 units 3 times daily  · Increased dose slightly given hyperglycemia  · Continue ISS  · Follow-up with endocrine OP  · Hypoglycemia protocol

## 2023-01-10 NOTE — ASSESSMENT & PLAN NOTE
· History of such  · Current home regimen -Florinef 0 2 daily, hydrocortisone 15 mg in a m  and 10 mg in p m , midodrine 10 mg 3 times daily      · Given persistent and recurrent orthostatic hypotension, endocrine consulted and regimen adjusted  · Seems like despite this regimen he was having orthostatic hypotension and near syncope  · Has had lost 4 pounds in a few days  · For now we will continue the bowel regimen, apply stockings, abdominal binder and check orthostatics qshift  · Cardiology follwoing  · IVF

## 2023-01-10 NOTE — PROGRESS NOTES
[facility]  Progress Note - Amanda Patel 1955, 74 y o  male MRN: [id number]  [facility] Encounter: [id number]  Primary Care Provider: Adamaris Lindsay MD   Date and time admitted to hospital: 1/6/2023 10:30 AM    Adrenal insufficiency   Assessment & Plan  · Labs with endocrine outpatient  · Continue Florinef 0 2 mg daily  · On hydrocortisone 50 mg in a m  and 10 mg in evening  Given persistent and recurrent orthostatic hypotension, will increase hydrocortisone to 20 mg qam and 15mg qpm with   2mg florinef daily     Orthostatic hypotension  Assessment & Plan  · History of such  · Current home regimen -Florinef 0 2 daily, hydrocortisone 15 mg in a m  and 10 mg in p m , midodrine 10 mg 3 times daily  · Given persistent and recurrent orthostatic hypotension, endocrine consulted and regimen adjusted  · Seems like despite this regimen he was having orthostatic hypotension and near syncope  · Has had lost 4 pounds in a few days  · For now we will continue the bowel regimen, apply stockings, abdominal binder and check orthostatics qshift  · Cardiology follwoing  · IVF    Psychiatric disorder  Assessment & Plan  · Continue Risperidone and Zoloft at home dose    Anemia of chronic disease  Assessment & Plan  · Baseline seems to be between 8-10  · currently at baseline      History of stroke  Assessment & Plan  · Aspirin, statin, Eliquis    Paroxysmal atrial fibrillation   Assessment & Plan  · Hold metoprolol due to severe orthostatic hypotension    · Continue Eliquis    Insulin dependent type 1 diabetes mellitus   Assessment & Plan  Lab Results   Component Value Date    HGBA1C 7 5 (H) 07/22/2022       Recent Labs     01/09/23  1622 01/09/23  2108 01/10/23  0735 01/10/23  1101   POCGLU 207* 124 114 176*       Blood Sugar Average: Last 72 hrs:  (P) 176 [id number]     ·  home regimen of Lantus 18 units at bedtime, Humalog 6 units 3 times daily  · Increased dose slightly given hyperglycemia  · Continue ISS  · Follow-up with endocrine OP  · Hypoglycemia protocol    * Syncope  Assessment & Plan  · Syncope/near syncope x2-3  · Also had another similar episode this morning while working with physical therapy which was likely related to orthostatic hypotension as well and his orthostatic vitals were positive for hypotension     · Most likely suspect secondary to orthostatic hypotension only  · EKG - NSR  Troponin negative  · Ct head: No acute intracranial CT abnormality  Stable chronic bilateral gangliocapsular infarcts  Chronic microangiopathy  Right maxillary sinus mucosal thickening containing hyperattenuating foci may indicate inspissated secretions versus fungal colonization  · Lost 4 lb in few days - ? If that contributing  He is scheduled to get EGD and colonoscopy end of this month  · Echo shows ef of 65% with grade 1 DD and no aortic stenosis,    PLAN  · Cardiology following  · Hold metoprolol for now  · Continue current dose of Florinef and midodrine  · PT OT evaluation>> recommend rehab        VTE Pharmacologic Prophylaxis:   Moderate Risk (Score 3-4) - Pharmacological DVT Prophylaxis Ordered: apixaban (Eliquis)  Patient Centered Rounds: I performed bedside rounds with nursing staff today  Discussions with Specialists or Other Care Team Provider: N/A    Education and Discussions with Family / Patient: Updated  (daughter) via phone  Time Spent for Care: 45 minutes  More than 50% of total time spent on counseling and coordination of care as described above  Current Length of Stay: 3 day(s)  Current Patient Status: Inpatient   Certification Statement: The patient will continue to require additional inpatient hospital stay due to Orthostatic hypotension  Discharge Plan: Anticipate discharge in 24-48 hrs to rehab facility  Code Status: Level 1 - Full Code    Subjective:   Seen and examined at bedside this morning    Paulino Macias is resting comfortably in bed   He is asymptomatic while laying down, but did have some orthostatic symptoms while walking with PT  He was noted to be hypotensive at this time  No other complaints from the patient  Objective:     Vitals:   Temp (24hrs), Av 4 °F (36 3 °C), Min:97 2 °F (36 2 °C), Max:97 5 °F (36 4 °C)    Temp:  [97 2 °F (36 2 °C)-97 5 °F (36 4 °C)] 97 5 °F (36 4 °C)  HR:  [73-78] 75  Resp:  [14-16] 14  BP: ()/(48-80) 90/48  SpO2:  [95 %-100 %] 100 %  Body mass index is 31 76 kg/m²  Input and Output Summary (last 24 hours): Intake/Output Summary (Last 24 hours) at 1/10/2023 1456  Last data filed at 1/10/2023 1236  Gross per 24 hour   Intake 220 ml   Output 1350 ml   Net -1130 ml       Physical Exam:   Physical Exam  Vitals reviewed  Constitutional:       Appearance: Normal appearance  HENT:      Head: Normocephalic and atraumatic  Eyes:      General: No scleral icterus  Pupils: Pupils are equal, round, and reactive to light  Cardiovascular:      Rate and Rhythm: Normal rate and regular rhythm  Pulses: Normal pulses  Heart sounds: Normal heart sounds  No murmur heard  No friction rub  No gallop  Pulmonary:      Effort: Pulmonary effort is normal  No respiratory distress  Breath sounds: Normal breath sounds  No wheezing or rales  Abdominal:      General: Abdomen is flat  Bowel sounds are normal  There is no distension  Palpations: Abdomen is soft  Tenderness: There is no abdominal tenderness  Comments: Abdominal binder in place   Musculoskeletal:      Right lower leg: No edema  Left lower leg: No edema  Skin:     General: Skin is warm and dry  Capillary Refill: Capillary refill takes less than 2 seconds  Findings: No rash  Neurological:      General: No focal deficit present  Mental Status: He is alert and oriented to person, place, and time  Cranial Nerves: No cranial nerve deficit  Sensory: No sensory deficit  Psychiatric:         Mood and Affect: Mood normal          Behavior: Behavior normal           Additional Data:     Labs:  Results from last 7 days   Lab Units 01/10/23  0534 01/09/23  0604   WBC Thousand/uL 7 66 8 17   HEMOGLOBIN g/dL 9 9* 10 4*   HEMATOCRIT % 31 9* 32 3*   PLATELETS Thousands/uL 191 228   NEUTROS PCT %  --  71   LYMPHS PCT %  --  16   MONOS PCT %  --  11   EOS PCT %  --  1     Results from last 7 days   Lab Units 01/10/23  0534 01/08/23  0526 01/06/23  1053   SODIUM mmol/L 144   < > 140   POTASSIUM mmol/L 3 8   < > 3 7   CHLORIDE mmol/L 111*   < > 112*   CO2 mmol/L 28   < > 25   BUN mg/dL 16   < > 18   CREATININE mg/dL 0 96   < > 1 26   ANION GAP mmol/L 5   < > 3*   CALCIUM mg/dL 8 7   < > 8 5   ALBUMIN g/dL  --   --  3 1*   TOTAL BILIRUBIN mg/dL  --   --  0 32   ALK PHOS U/L  --   --  76   ALT U/L  --   --  13   AST U/L  --   --  8   GLUCOSE RANDOM mg/dL 134   < > 123    < > = values in this interval not displayed           Results from last 7 days   Lab Units 01/10/23  1101 01/10/23  0735 01/09/23  2108 01/09/23  1622 01/09/23  1124 01/09/23  0750 01/08/23  2035 01/08/23  1651 01/08/23  1127 01/08/23  0839 01/07/23  2109 01/07/23  1602   POC GLUCOSE mg/dl 176* 114 124 207* 205* 127 209* 191* 220* 270* 104 157*               Lines/Drains:  Invasive Devices     Peripheral Intravenous Line  Duration           Peripheral IV 01/08/23 Left Forearm 1 day                      Imaging: Reviewed radiology reports from this admission including: CT head    Recent Cultures (last 7 days):         Last 24 Hours Medication List:   Current Facility-Administered Medications   Medication Dose Route Frequency Provider Last Rate   • apixaban  5 mg Oral BID Chetna Fletcher MD     • aspirin  81 mg Oral Daily Chetna Fletcher MD     • atorvastatin  10 mg Oral Daily Chetna Fletcher MD     • calcitriol  0 25 mcg Oral Daily Chetna Fletcher MD     • cyanocobalamin  100 mcg Oral Daily Chetna Fletcher MD     • fludrocortisone  0 2 mg Oral Daily Gladis Gates MD     • hydrocortisone  15 mg Oral Q24H Linda Rodriguez MD     • hydrocortisone  20 mg Oral QAM Linda Rodriguez MD     • insulin glargine  20 Units Subcutaneous HS Naz Perez MD     • insulin lispro  1-5 Units Subcutaneous TID AC Gladis Gates MD     • insulin lispro  1-5 Units Subcutaneous HS Gladis Gates MD     • insulin lispro  7 Units Subcutaneous TID With Meals Naz Perez MD     • midodrine  10 mg Oral TID AC Gladis Gates MD     • potassium chloride  20 mEq Oral BID Gladis Gates MD     • risperiDONE  1 mg Oral BID Gladis Gates MD     • sertraline  100 mg Oral Daily Gladis Gates MD          Today, Patient Was Seen By: Alexsander Prater    **Please Note: This note may have been constructed using a voice recognition system  **

## 2023-01-11 LAB
GLUCOSE SERPL-MCNC: 143 MG/DL (ref 65–140)
GLUCOSE SERPL-MCNC: 175 MG/DL (ref 65–140)
GLUCOSE SERPL-MCNC: 227 MG/DL (ref 65–140)
GLUCOSE SERPL-MCNC: 71 MG/DL (ref 65–140)

## 2023-01-11 RX ORDER — INSULIN GLARGINE 100 [IU]/ML
15 INJECTION, SOLUTION SUBCUTANEOUS
Status: DISCONTINUED | OUTPATIENT
Start: 2023-01-11 | End: 2023-01-12

## 2023-01-11 RX ADMIN — INSULIN LISPRO 7 UNITS: 100 INJECTION, SOLUTION INTRAVENOUS; SUBCUTANEOUS at 07:55

## 2023-01-11 RX ADMIN — SERTRALINE 100 MG: 100 TABLET, FILM COATED ORAL at 07:52

## 2023-01-11 RX ADMIN — FLUDROCORTISONE ACETATE 0.2 MG: 0.1 TABLET ORAL at 07:54

## 2023-01-11 RX ADMIN — MIDODRINE HYDROCHLORIDE 10 MG: 5 TABLET ORAL at 05:52

## 2023-01-11 RX ADMIN — VITAM B12 100 MCG: 100 TAB at 07:53

## 2023-01-11 RX ADMIN — CALCITRIOL CAPSULES 0.25 MCG 0.25 MCG: 0.25 CAPSULE ORAL at 07:53

## 2023-01-11 RX ADMIN — INSULIN LISPRO 7 UNITS: 100 INJECTION, SOLUTION INTRAVENOUS; SUBCUTANEOUS at 17:41

## 2023-01-11 RX ADMIN — INSULIN GLARGINE 15 UNITS: 100 INJECTION, SOLUTION SUBCUTANEOUS at 22:19

## 2023-01-11 RX ADMIN — POTASSIUM CHLORIDE 20 MEQ: 750 TABLET, EXTENDED RELEASE ORAL at 17:41

## 2023-01-11 RX ADMIN — ATORVASTATIN CALCIUM 10 MG: 10 TABLET, FILM COATED ORAL at 07:53

## 2023-01-11 RX ADMIN — RISPERIDONE 1 MG: 1 TABLET ORAL at 17:41

## 2023-01-11 RX ADMIN — HYDROCORTISONE 10 MG: 10 TABLET ORAL at 13:18

## 2023-01-11 RX ADMIN — APIXABAN 5 MG: 5 TABLET, FILM COATED ORAL at 07:53

## 2023-01-11 RX ADMIN — RISPERIDONE 1 MG: 1 TABLET ORAL at 07:52

## 2023-01-11 RX ADMIN — ASPIRIN 81 MG: 81 TABLET, CHEWABLE ORAL at 07:52

## 2023-01-11 RX ADMIN — INSULIN LISPRO 2 UNITS: 100 INJECTION, SOLUTION INTRAVENOUS; SUBCUTANEOUS at 17:40

## 2023-01-11 RX ADMIN — HYDROCORTISONE 20 MG: 20 TABLET ORAL at 07:54

## 2023-01-11 RX ADMIN — APIXABAN 5 MG: 5 TABLET, FILM COATED ORAL at 17:41

## 2023-01-11 RX ADMIN — MIDODRINE HYDROCHLORIDE 10 MG: 5 TABLET ORAL at 11:47

## 2023-01-11 RX ADMIN — MIDODRINE HYDROCHLORIDE 10 MG: 5 TABLET ORAL at 17:41

## 2023-01-11 RX ADMIN — INSULIN LISPRO 7 UNITS: 100 INJECTION, SOLUTION INTRAVENOUS; SUBCUTANEOUS at 11:47

## 2023-01-11 RX ADMIN — INSULIN LISPRO 1 UNITS: 100 INJECTION, SOLUTION INTRAVENOUS; SUBCUTANEOUS at 22:18

## 2023-01-11 RX ADMIN — POTASSIUM CHLORIDE 20 MEQ: 750 TABLET, EXTENDED RELEASE ORAL at 07:53

## 2023-01-11 NOTE — PLAN OF CARE
Problem: PAIN - ADULT  Goal: Verbalizes/displays adequate comfort level or baseline comfort level  Description: Interventions:  - Encourage patient to monitor pain and request assistance  - Assess pain using appropriate pain scale  - Administer analgesics based on type and severity of pain and evaluate response  - Implement non-pharmacological measures as appropriate and evaluate response  - Consider cultural and social influences on pain and pain management  - Notify physician/advanced practitioner if interventions unsuccessful or patient reports new pain  Outcome: Progressing     Problem: INFECTION - ADULT  Goal: Absence or prevention of progression during hospitalization  Description: INTERVENTIONS:  - Assess and monitor for signs and symptoms of infection  - Monitor lab/diagnostic results  - Monitor all insertion sites, i e  indwelling lines, tubes, and drains  - Monitor endotracheal if appropriate and nasal secretions for changes in amount and color  - [place] appropriate cooling/warming therapies per order  - Administer medications as ordered  - Instruct and encourage patient and family to use good hand hygiene technique  - Identify and instruct in appropriate isolation precautions for identified infection/condition  Outcome: Progressing     Problem: DISCHARGE PLANNING  Goal: Discharge to home or other facility with appropriate resources  Description: INTERVENTIONS:  - Identify barriers to discharge w/patient and caregiver  - Arrange for needed discharge resources and transportation as appropriate  - Identify discharge learning needs (meds, wound care, etc )  - Arrange for interpretive services to assist at discharge as needed  - Refer to Case Management Department for coordinating discharge planning if the patient needs post-hospital services based on physician/advanced practitioner order or complex needs related to functional status, cognitive ability, or social support system  Outcome: Progressing Problem: Knowledge Deficit  Goal: Patient/family/caregiver demonstrates understanding of disease process, treatment plan, medications, and discharge instructions  Description: Complete learning assessment and assess knowledge base    Interventions:  - Provide teaching at level of understanding  - Provide teaching via preferred learning methods  Outcome: Progressing     Problem: CARDIOVASCULAR - ADULT  Goal: Maintains optimal cardiac output and hemodynamic stability  Description: INTERVENTIONS:  - Monitor I/O, vital signs and rhythm  - Monitor for S/S and trends of decreased cardiac output  - Administer and titrate ordered vasoactive medications to optimize hemodynamic stability  - Assess quality of pulses, skin color and temperature  - Assess for signs of decreased coronary artery perfusion  - Instruct patient to report change in severity of symptoms  Outcome: Progressing  Goal: Absence of cardiac dysrhythmias or at baseline rhythm  Description: INTERVENTIONS:  - Continuous cardiac monitoring, vital signs, obtain 12 lead EKG if ordered  - Administer antiarrhythmic and heart rate control medications as ordered  - Monitor electrolytes and administer replacement therapy as ordered  Outcome: Progressing     Problem: GASTROINTESTINAL - ADULT  Goal: Minimal or absence of nausea and/or vomiting  Description: INTERVENTIONS:  - Administer IV fluids if ordered to ensure adequate hydration  - Maintain NPO status until nausea and vomiting are resolved  - Nasogastric tube if ordered  - Administer ordered antiemetic medications as needed  - Provide nonpharmacologic comfort measures as appropriate  - Advance diet as tolerated, if ordered  - Consider nutrition services referral to assist patient with adequate nutrition and appropriate food choices  Outcome: Progressing  Goal: Maintains or returns to baseline bowel function  Description: INTERVENTIONS:  - Assess bowel function  - Encourage oral fluids to ensure adequate hydration  - Administer IV fluids if ordered to ensure adequate hydration  - Administer ordered medications as needed  - Encourage mobilization and activity  - Consider nutritional services referral to assist patient with adequate nutrition and appropriate food choices  Outcome: Progressing  Goal: Maintains adequate nutritional intake  Description: INTERVENTIONS:  - Monitor percentage of each meal consumed  - Identify factors contributing to decreased intake, treat as appropriate  - Assist with meals as needed  - Monitor I&O, weight, and lab values if indicated  - Obtain nutrition services referral as needed  Outcome: Progressing     Problem: METABOLIC, FLUID AND ELECTROLYTES - ADULT  Goal: Electrolytes maintained within normal limits  Description: INTERVENTIONS:  - Monitor labs and assess patient for signs and symptoms of electrolyte imbalances  - Administer electrolyte replacement as ordered  - Monitor response to electrolyte replacements, including repeat lab results as appropriate  - Instruct patient on fluid and nutrition as appropriate  Outcome: Progressing  Goal: Glucose maintained within target range  Description: INTERVENTIONS:  - Monitor Blood Glucose as ordered  - Assess for signs and symptoms of hyperglycemia and hypoglycemia  - Administer ordered medications to maintain glucose within target range  - Assess nutritional intake and initiate nutrition service referral as needed  Outcome: Progressing     Problem: MOBILITY - ADULT  Goal: Maintain or return to baseline ADL function  Description: INTERVENTIONS:  -  Assess patient's ability to carry out ADLs; assess patient's baseline for ADL function and identify physical deficits which impact ability to perform ADLs (bathing, care of mouth/teeth, toileting, grooming, dressing, etc )  - Assess/evaluate cause of self-care deficits   - Assess range of motion  - Assess patient's mobility; develop plan if impaired  - Assess patient's need for assistive devices and provide as appropriate  - Encourage maximum independence but intervene and supervise when necessary  - Involve family in performance of ADLs  - Assess for home care needs following discharge   - Consider OT consult to assist with ADL evaluation and planning for discharge  - Provide patient education as appropriate  Outcome: Progressing  Goal: Maintains/Returns to pre admission functional level  Description: INTERVENTIONS:  - Perform BMAT or MOVE assessment daily    - Set and communicate daily mobility goal to care team and patient/family/caregiver     - Collaborate with rehabilitation services on mobility goals if consultedy  - Out of bed for toileting  - Record patient progress and toleration of activity level   Outcome: Progressing

## 2023-01-11 NOTE — PLAN OF CARE
Problem: PHYSICAL THERAPY ADULT  Goal: Performs mobility at highest level of function for planned discharge setting  See evaluation for individualized goals  Description: Treatment/Interventions: Functional transfer training, LE strengthening/ROM, Elevations, Therapeutic exercise, Endurance training, Cognitive reorientation, Patient/family training, Equipment eval/education, Bed mobility, Gait training          See flowsheet documentation for full assessment, interventions and recommendations  Outcome: Progressing  Note: Prognosis: Fair  Problem List: Decreased strength, Impaired balance, Decreased endurance, Decreased mobility, Decreased cognition, Decreased safety awareness  Assessment: Pt seen for PT treatment session w/ focus on t/f training, ther ex instruction, + gait training  Progress primarily impaired by symptomatic orthostatic hypotension + CGA required for t/f and ambulation  Pt did demonstrate some progress from IE as he was able to ambulate  From a PT standpoint continue to recommend rehab upon d/c   Barriers to Discharge: Inaccessible home environment, Decreased caregiver support     PT Discharge Recommendation: Post acute rehabilitation services    See flowsheet documentation for full assessment

## 2023-01-11 NOTE — OCCUPATIONAL THERAPY NOTE
Occupational Therapy Progress Note     Patient Name: Mohan Maldonado  [id number] Date: 1/11/2023  Problem List  Principal Problem:    Syncope  Active Problems:    Insulin dependent type 1 diabetes mellitus     Adrenal insufficiency     Paroxysmal atrial fibrillation     History of stroke    Anemia of chronic disease    Psychiatric disorder    Orthostatic hypotension            01/11/23 1014   OT Last Visit   OT Visit Date 01/11/23   Note Type   Note Type Treatment for insurance authorization   Pain Assessment   Pain Assessment Tool 0-10   Pain Score No Pain   Restrictions/Precautions   Weight Bearing Precautions Per Order No   Braces or Orthoses Other (Comment)  (abdominal binder, FAUZIA stockings)   Other Precautions Cognitive; Chair Alarm; Bed Alarm; Fall Risk;Pain  [facility])   Lifestyle   Autonomy Pt provides inconsistent PLOF  Per chart, pt was I with ADLs, and was primarily responsible for IADLs 2* wife's cognitive deficits  Pt typically ambulates with use of spc  As of last admission, pt was driving, and managing medications I'ly (but poorly)  Reciprocal Relationships wife with dementia, daughter   Service to Others retired   ADL   Where Assessed Chair   Grooming Assistance 5  [place] 68; Teeth care   LB Dressing Assistance 4  Minimal Assistance   LB Dressing Deficit Setup;Don/doff R sock; Don/doff L sock   Bed Mobility   Additional Comments Pt greeted sitting on EOB  Transfers   Sit to Stand   (CGA)   Additional items Assist x 1; Increased time required;Verbal cues   Stand to Sit   (CGA)   Additional items Assist x 1; Increased time required;Verbal cues   Additional Comments (S)  with RW  Upon standing Pt with R lateral lean and R facial droop lasting x3 sec and resolved  RN updated/aware  Pt with BP seated prior to session: 98/50 (via manual cuff), standing with RW: 78/44, and seated in recliner chair with BLE elevated: 79/44     Functional Mobility   Functional Mobility   (CGA) Additional Comments CGA with functional mobility- short household distances with SBAx1 for chair follow  Additional items Rolling walker   Cognition   Overall Cognitive Status WFL   Arousal/Participation Responsive; Cooperative   Attention Attends with cues to redirect   Orientation Level Oriented X4   Memory Decreased recall of recent events;Decreased recall of precautions;Decreased short term memory   Following Commands Follows one step commands with increased time or repetition   Comments Pt with flat affect during OT session, however, able to participate in following one-step simple commands  Pt with decreased safety awareness  Activity Tolerance   Activity Tolerance Patient limited by fatigue;Treatment limited secondary to medical complications (Comment)   Medical Staff Made Aware Portions of tx completed with PT 2* to Pt's medical complexity and decreased endurance  Assessment   Assessment Pt greeted bedside for OT treatment on 1/11/2023 focusing on maximizing independence with ADLs  Pt min A with LB dressing and S with oral care  Pt completes functional transfers with CGA and functional mobility with CGA x1 with RW  Pt with BP seated prior to session: 98/50 (via manual cuff), standing with RW: 78/44, and seated in recliner chair with BLE elevated: 79/44  Limitations that impact functional performance include decreased ADL status, decreased UE ROM, decreased UE strength, decreased safe judgement during ADLs, decreased cognition, decreased endurance, decreased self care transfers, decreased high level ADLs and pain  Occupational performance areas to address ADL retraining, functional transfer training, UE strengthening/ROM, endurance training, cognitive reorientation, Pt/caregiver education, equipment evaluation/education, compensatory technique education, energy conservation and activity engagement    Pt would benefit from continued skilled OT services while in hospital to maximize independence with ADLs  Will continue to follow Pt's goals and progress  Pt would benefit from post acute rehabilitation services upon DC to maximize safety and independence with ADLs and functional tasks of choice  Plan   Treatment Interventions ADL retraining;Functional transfer training;UE strengthening/ROM; Endurance training;Cognitive reorientation;Patient/family training;Equipment evaluation/education; Compensatory technique education; Activityengagement; Energy conservation   Goal Expiration Date 01/17/23   OT Treatment Day 1   OT Frequency Other (comment)  (2-4x/wk)   Recommendation   OT Discharge Recommendation Post acute rehabilitation services   Additional Comments  The patient's raw score on the AM-PAC Daily Activity inpatient short form is 19, standardized score is 40 22, greater than 39 4  Patients at this level are likely to benefit from discharge to home  Please refer to the recommendation of the Occupational Therapist for safe discharge planning  AM-PAC Daily Activity Inpatient   Lower Body Dressing 3   Bathing 3   Toileting 3   Upper Body Dressing 3   Grooming 3   Eating 4   Daily Activity Raw Score 19   Daily Activity Standardized Score (Calc for Raw Score >=11) 40 22   AM-[facility] Applied Cognition Inpatient   Following a Speech/Presentation 3   Understanding Ordinary Conversation 3   Taking Medications 3   Remembering Where Things Are Placed or Put Away 3   Remembering List of 4-5 Errands 3   Taking Care of Complicated Tasks 2   Applied Cognition Raw Score 17   Applied Cognition Standardized Score 36 52   End of Consult   Education Provided Yes   Patient Position at End of Consult Bedside chair;Bed/Chair alarm activated; All needs within reach   Nurse Communication Nurse aware of consult     Addendum: Pt engages in HEP during OT session seated in chair: UE AROM of B/L UE shoulder flexion (1x10) and Chair push ups in order to maximizing independence with UB ADLs        Jordan Szymanski MS, OTR/L

## 2023-01-11 NOTE — ASSESSMENT & PLAN NOTE
Lab Results   Component Value Date    HGBA1C 7 5 (H) 07/22/2022       Recent Labs     01/10/23  1559 01/10/23  2056 01/11/23  0758 01/11/23  1132   POCGLU 237* 178* 71 143*       Blood Sugar Average: Last 72 hrs:  (P) 176 [id number]     ·  home regimen of Lantus 18 units at bedtime, Humalog 6 units 3 times daily  · Increased dose slightly given hyperglycemia  · Continue ISS  · Follow-up with endocrine OP  · Hypoglycemia protocol

## 2023-01-11 NOTE — CASE MANAGEMENT
Case Management Discharge Planning Note    Patient name Moris Jiménez  Location [facility] 831/[facility] 664-99 MRN [id number]  : 1955 Date 2023       Current Admission Date: 2023  Current Admission Diagnosis:Syncope   Patient Active Problem List    Diagnosis Date Noted   • Syncope 2023   • Acute encephalopathy 2022   • Unspecified protein-calorie malnutrition ([facility] [address] ) 2022   • Unintentional weight loss 2022   • Type 2 MI (myocardial infarction) ([facility] [address] ) 2022   • Orthostatic hypotension 2022   • Recurrent hypoglycemia 10/19/2022   • Insulin dependent type 1 diabetes mellitus  10/19/2022   • Adrenal insufficiency  10/19/2022   • Essential hypertension 10/19/2022   • Paroxysmal atrial fibrillation  10/19/2022   • History of stroke 10/19/2022   • Anemia of chronic disease 10/19/2022   • Psychiatric disorder 10/19/2022      LOS (days): 4  Geometric Mean LOS (GMLOS) (days): 2 30  Days to GMLOS:-1 8     OBJECTIVE:  Risk of Unplanned Readmission Score: 23 38         Current admission status: Inpatient   Preferred Pharmacy:   [address] [place] [address] [facility]  Phone: [phone] Fax: [phone] [address]  [address]  [address] [id number]  Phone: [phone] Fax: [phone]    Primary Care Provider: Viktor Walker MD    Primary Insurance: [facility]  Secondary Insurance:     DISCHARGE DETAILS:        Dghter only interested in [facility], if they cannot accept she will take pt home and resume [address]         Is the patient interested in [facility] at discharge?: Yes  [address] requested[de-identified] Nursing, Occupational Therapy, Physical Therapy, [address] Name[de-identified] Other ([facility] )  [address] Provider[de-identified] PCP  Home Health Services Needed[de-identified] Gait/ADL Training, Evaluate Functional Status and Safety, Strengthening/Theraputic Exercises to Improve Function  Homebound Criteria Met[de-identified] Uses an Assist Device (i e  cane, walker, etc), Requires the Assistance of Another Person for Safe Ambulation or to Leave the Home  Supporting Clincal Findings[de-identified] Limited Endurance                                                       IMM Given (Date):: 01/11/23  IMM Given to[de-identified] Family     Additional Comments: Dghter looking for COVID vaccine card which [facility] is requiring dates and confirmation    A post acute care recommendation was made by your care team for [address]  Discussed Freedom of Choice with caregiver  Choice is to return/continue services    Referral via [facility] to [facility] accepted        Dghter called, states SO found card, she will bring in and provide so we can provide to [facility]

## 2023-01-11 NOTE — PROGRESS NOTES
Pastoral Care Progress Note    2023  Patient: Thalia Isidro : 1955  Admission Date & Time: 2023 1030  MRN: [id number] CSN: [id number]         made brief intro/check in visit w/ Pt  Remains available as needed

## 2023-01-11 NOTE — PLAN OF CARE
Problem: OCCUPATIONAL THERAPY ADULT  Goal: Performs self-care activities at highest level of function for planned discharge setting  See evaluation for individualized goals  Description: Treatment Interventions: ADL retraining, Functional transfer training, UE strengthening/ROM, Endurance training, Cognitive reorientation, Patient/family training, Equipment evaluation/education, Compensatory technique education, Activityengagement, Energy conservation          See flowsheet documentation for full assessment, interventions and recommendations  1/11/2023 1238 by Iwona Olivo OT  Outcome: Progressing  Note: Limitation: Decreased ADL status, Decreased UE strength, Decreased endurance, Decreased Safe judgement during ADL, Decreased cognition, Decreased fine motor control, Decreased self-care trans, Decreased high-level ADLs  Prognosis: Fair  Assessment: Pt greeted bedside for OT treatment on 1/11/2023 focusing on maximizing independence with ADLs  Pt min A with LB dressing and S with oral care  Pt completes functional transfers with CGA and functional mobility with CGA x1 with RW  Pt with BP seated prior to session: 98/50 (via manual cuff), standing with RW: 78/44, and seated in recliner chair with BLE elevated: 79/44  Limitations that impact functional performance include decreased ADL status, decreased UE ROM, decreased UE strength, decreased safe judgement during ADLs, decreased cognition, decreased endurance, decreased self care transfers, decreased high level ADLs and pain  Occupational performance areas to address ADL retraining, functional transfer training, UE strengthening/ROM, endurance training, cognitive reorientation, Pt/caregiver education, equipment evaluation/education, compensatory technique education, energy conservation and activity engagement   Pt would benefit from continued skilled OT services while in hospital to maximize independence with ADLs   Will continue to follow Pt's goals and progress  Pt would benefit from post acute rehabilitation services upon DC to maximize safety and independence with ADLs and functional tasks of choice  OT Discharge Recommendation: Post acute rehabilitation services       1/11/2023 1238 by Ana Saldaña OT  Outcome: Progressing  Note: Limitation: Decreased ADL status, Decreased UE strength, Decreased endurance, Decreased Safe judgement during ADL, Decreased cognition, Decreased fine motor control, Decreased self-care trans, Decreased high-level ADLs  Prognosis: Fair  Assessment: Pt greeted bedside for OT treatment on 1/11/2023 focusing on maximizing independence with ADLs  Pt min A with LB dressing and S with oral care  Pt completes functional transfers with CGA and functional mobility with CGA x1 with RW  Pt with BP seated prior to session: 98/50 (via manual cuff), standing with RW: 78/44, and seated in recliner chair with BLE elevated: 79/44  Limitations that impact functional performance include decreased ADL status, decreased UE ROM, decreased UE strength, decreased safe judgement during ADLs, decreased cognition, decreased endurance, decreased self care transfers, decreased high level ADLs and pain  Occupational performance areas to address ADL retraining, functional transfer training, UE strengthening/ROM, endurance training, cognitive reorientation, Pt/caregiver education, equipment evaluation/education, compensatory technique education, energy conservation and activity engagement   Pt would benefit from continued skilled OT services while in hospital to maximize independence with ADLs  Will continue to follow Pt's goals and progress  Pt would benefit from post acute rehabilitation services upon DC to maximize safety and independence with ADLs and functional tasks of choice       OT Discharge Recommendation: Post acute rehabilitation services

## 2023-01-11 NOTE — PROGRESS NOTES
sbp via cherelle with physical therapy 78-80 (see documentation); pt resting in chair at this time with manual bp of 100/50

## 2023-01-11 NOTE — ASSESSMENT & PLAN NOTE
· Labs with endocrine outpatient  · Continue Florinef 0 2 mg daily  · On hydrocortisone 15 mg in a m  and 10 mg in evening  Endocrine adjusting to hydrocortisone to 20 mg qam and 10mg qpm with   2mg florinef daily   Appreciate their expertise

## 2023-01-11 NOTE — PROGRESS NOTES
[address] [facility]  Progress Note - Saulo Hong 1955, 74 y o  male MRN: [id number]  Unit/Bed#: [facility] 831-01 Encounter: [id number]  Primary Care Provider: Acacia Marcum MD   Date and time admitted to hospital: 1/6/2023 10:30 AM    Adrenal insufficiency   Assessment & Plan  · Labs with endocrine outpatient  · Continue Florinef 0 2 mg daily  · On hydrocortisone 15 mg in a m  and 10 mg in evening  Endocrine adjusting to hydrocortisone to 20 mg qam and 10mg qpm with   2mg florinef daily  Appreciate their expertise    Orthostatic hypotension  Assessment & Plan  · History of such  · Current home regimen -Florinef 0 2 daily, hydrocortisone 15 mg in a m  and 10 mg in p m , midodrine 10 mg 3 times daily  · Given persistent and recurrent orthostatic hypotension, endocrine consulted and regimen adjusted  · Seems like despite this regimen he was having orthostatic hypotension and near syncope  · Has had lost 4 pounds in a few days  · For now we will continue the bowel regimen, apply stockings, abdominal binder and check orthostatics qshift  · Cardiology follwoing  · IVF    Psychiatric disorder  Assessment & Plan  · Continue Risperidone and Zoloft at home dose    Anemia of chronic disease  Assessment & Plan  · Baseline seems to be between 8-10  · currently at baseline      History of stroke  Assessment & Plan  · Aspirin, statin, Eliquis    Paroxysmal atrial fibrillation   Assessment & Plan  · Hold metoprolol due to severe orthostatic hypotension    · Continue Eliquis    Insulin dependent type 1 diabetes mellitus   Assessment & Plan  Lab Results   Component Value Date    HGBA1C 7 5 (H) 07/22/2022       Recent Labs     01/10/23  1559 01/10/23  2056 01/11/23  0758 01/11/23  1132   POCGLU 237* 178* 71 143*       Blood Sugar Average: Last 72 hrs:  (P) 176 [id number]     ·  home regimen of Lantus 18 units at bedtime, Humalog 6 units 3 times daily  · Increased dose slightly given hyperglycemia  · Continue ISS  · Follow-up with endocrine OP  · Hypoglycemia protocol    * Syncope  Assessment & Plan  · Syncope/near syncope x2-3  · Also had another similar episode this morning while working with physical therapy which was likely related to orthostatic hypotension as well and his orthostatic vitals were positive for hypotension     · Most likely suspect secondary to orthostatic hypotension only  · EKG - NSR  Troponin negative  · Ct head: No acute intracranial CT abnormality  Stable chronic bilateral gangliocapsular infarcts  Chronic microangiopathy  Right maxillary sinus mucosal thickening containing hyperattenuating foci may indicate inspissated secretions versus fungal colonization  · Lost 4 lb in few days - ? If that contributing  He is scheduled to get EGD and colonoscopy end of this month  · Echo shows ef of 65% with grade 1 DD and no aortic stenosis,    PLAN  · Cardiology following  · Hold metoprolol for now  · Continue current dose of Florinef and midodrine  · PT OT evaluation>> recommend rehab        VTE Pharmacologic Prophylaxis:   Moderate Risk (Score 3-4) - Pharmacological DVT Prophylaxis Ordered: apixaban (Eliquis)  Patient Centered Rounds: I performed bedside rounds with nursing staff today  Discussions with Specialists or Other Care Team Provider: N/A    Education and Discussions with Family / Patient: Updated  (daughter) via phone  Time Spent for Care: 45 minutes  More than 50% of total time spent on counseling and coordination of care as described above  Current Length of Stay: 4 day(s)  Current Patient Status: Inpatient   Certification Statement: The patient will continue to require additional inpatient hospital stay due to Medical optimization for adrenal insuffuciency  Discharge Plan: Anticipate discharge in 24-48 hrs to rehab facility  Code Status: Level 1 - Full Code    Subjective:   Seen and examined at bedside this morning   Patient sitting in chair and states he feels well  He is occasionally getting dizzy and hypotensive upon standing but states that is his baseline  No new complaints    Objective:     Vitals:   Temp (24hrs), Av 5 °F (36 4 °C), Min:96 6 °F (35 9 °C), Max:97 9 °F (36 6 °C)    Temp:  [96 6 °F (35 9 °C)-97 9 °F (36 6 °C)] 97 2 °F (36 2 °C)  HR:  [73-81] 80  Resp:  [14-18] 18  BP: ()/(44-84) 116/66  SpO2:  [85 %-100 %] 99 %  Body mass index is 31 76 kg/m²  Input and Output Summary (last 24 hours): Intake/Output Summary (Last 24 hours) at 2023 1539  Last data filed at 2023 0322  Gross per 24 hour   Intake 220 ml   Output 925 ml   Net -705 ml       Physical Exam:   Physical Exam  Vitals reviewed  Constitutional:       Appearance: Normal appearance  HENT:      Head: Normocephalic and atraumatic  Eyes:      General: No scleral icterus  Pupils: Pupils are equal, round, and reactive to light  Cardiovascular:      Rate and Rhythm: Normal rate and regular rhythm  Pulses: Normal pulses  Heart sounds: Normal heart sounds  No murmur heard  No friction rub  No gallop  Pulmonary:      Effort: Pulmonary effort is normal  No respiratory distress  Breath sounds: Normal breath sounds  No wheezing or rales  Abdominal:      General: Abdomen is flat  Bowel sounds are normal  There is no distension  Palpations: Abdomen is soft  Tenderness: There is no abdominal tenderness  Comments: Abdominal binder in place   Musculoskeletal:      Right lower leg: No edema  Left lower leg: No edema  Skin:     General: Skin is warm and dry  Capillary Refill: Capillary refill takes less than 2 seconds  Findings: No rash  Neurological:      General: No focal deficit present  Mental Status: He is alert and oriented to person, place, and time  Cranial Nerves: No cranial nerve deficit  Sensory: No sensory deficit     Psychiatric:         Mood and Affect: Mood normal  Behavior: Behavior normal        Additional Data:     Labs:  Results from last 7 days   Lab Units 01/10/23  0534 01/09/23  0604   WBC Thousand/uL 7 66 8 17   HEMOGLOBIN g/dL 9 9* 10 4*   HEMATOCRIT % 31 9* 32 3*   PLATELETS Thousands/uL 191 228   NEUTROS PCT %  --  71   LYMPHS PCT %  --  16   MONOS PCT %  --  11   EOS PCT %  --  1     Results from last 7 days   Lab Units 01/10/23  0534 01/08/23  0526 01/06/23  1053   SODIUM mmol/L 144   < > 140   POTASSIUM mmol/L 3 8   < > 3 7   CHLORIDE mmol/L 111*   < > 112*   CO2 mmol/L 28   < > 25   BUN mg/dL 16   < > 18   CREATININE mg/dL 0 96   < > 1 26   ANION GAP mmol/L 5   < > 3*   CALCIUM mg/dL 8 7   < > 8 5   ALBUMIN g/dL  --   --  3 1*   TOTAL BILIRUBIN mg/dL  --   --  0 32   ALK PHOS U/L  --   --  76   ALT U/L  --   --  13   AST U/L  --   --  8   GLUCOSE RANDOM mg/dL 134   < > 123    < > = values in this interval not displayed           Results from last 7 days   Lab Units 01/11/23  1132 01/11/23  0758 01/10/23  2056 01/10/23  1559 01/10/23  1101 01/10/23  0735 01/09/23  2108 01/09/23  1622 01/09/23  1124 01/09/23  0750 01/08/23  2035 01/08/23  1651   POC GLUCOSE mg/dl 143* 71 178* 237* 176* 114 124 207* 205* 127 209* 191*               Lines/Drains:  Invasive Devices     Peripheral Intravenous Line  Duration           Peripheral IV 01/08/23 Left Forearm 2 days              Imaging: Personally reviewed the following imaging: CT head    Recent Cultures (last 7 days):         Last 24 Hours Medication List:   Current Facility-Administered Medications   Medication Dose Route Frequency Provider Last Rate   • apixaban  5 mg Oral BID Charmaine Real MD     • aspirin  81 mg Oral Daily Charmaine Real MD     • atorvastatin  10 mg Oral Daily Charmaine Real MD     • calcitriol  0 25 mcg Oral Daily Charmaine Real MD     • cyanocobalamin  100 mcg Oral Daily Charmaine Real MD     • fludrocortisone  0 2 mg Oral Daily Sue Pedro MD     • hydrocortisone  10 mg Oral Q24H Linda Rodriguez MD     • hydrocortisone  20 mg Oral QAM Oluwatomisin Nyasia Dubois MD     • insulin glargine  15 Units Subcutaneous HS Linda Rodriguez MD     • insulin lispro  1-5 Units Subcutaneous TID AC Gladis Gates MD     • insulin lispro  1-5 Units Subcutaneous HS Gladis Gates MD     • insulin lispro  7 Units Subcutaneous TID With Meals Naz Perez MD     • midodrine  10 mg Oral TID AC Gladis Gates MD     • potassium chloride  20 mEq Oral BID Gladis Gates MD     • risperiDONE  1 mg Oral BID Gladis Gates MD     • sertraline  100 mg Oral Daily Gladis Gates MD          Today, Patient Was Seen By: Alexsander Prater    **Please Note: This note may have been constructed using a voice recognition system  **

## 2023-01-11 NOTE — QUICK NOTE
Chart reviewed  Glucose trend noted  Will decrease Lantus to 15 units daily at bedtime, continue on current Premeal insulin 7units tid with correctional scale

## 2023-01-11 NOTE — PHYSICAL THERAPY NOTE
Physical Therapy Treatment Note    Patient's Name: Cornell Mccabe  : 23 1013   PT Last Visit   PT Visit Date 23   Note Type   Note Type Treatment for insurance authorization   Pain Assessment   Pain Assessment Tool 0-10   Pain Score No Pain   Restrictions/Precautions   Weight Bearing Precautions Per Order No   Braces or Orthoses   (pt had abdominal binder + FAUZIA stockings donned prior to PT treatment)   Other Precautions Cognitive; Chair Alarm; Bed Alarm; Fall Risk  (orthostatic hypotension)   General   Chart Reviewed Yes   Additional Pertinent History (S)  Vitals assessed seated EOB (BP 98/50 via manual cuff, HR 75), standing (BP 78/44, HR 76), and after gait training seated in recliner w/ BLE elevated (BP 79/44)  Response to Previous Treatment Patient with no complaints from previous session  Family/Caregiver Present No   Subjective   Subjective Pt agreeable to mobilize  Bed Mobility   Supine to Sit Unable to assess   Sit to Supine Unable to assess   Additional Comments Pt greeted seated EOB  Transfers   Sit to Stand   (cga)   Additional items Assist x 1; Increased time required;Verbal cues   Stand to Sit   (cga)   Additional items Assist x 1; Increased time required;Verbal cues   Additional Comments RW   Ambulation/Elevation   Gait pattern Excessively slow; Short stride  (increased R lateral lean w/ increased distance)   Gait Assistance   (cga)   Additional items Assist x 1;Verbal cues; Tactile cues   Assistive Device Rolling walker   Distance 25'   Stair Management Assistance Not tested   Balance   Static Sitting Fair   Dynamic Sitting Fair -   Static Standing Fair -   Dynamic Standing Poor +   Ambulatory Poor +  (RW)   Endurance Deficit   Endurance Deficit Yes   Endurance Deficit Description orthostatic hypotension, pt reports "spacing out" w/ increased standing time + ambulation distance   Activity Tolerance   Activity Tolerance Patient limited by fatigue;Treatment limited secondary to medical complications (Comment)  (orthostatic hypotension)   Medical Staff Made Aware [facility]   Nurse Made Aware yes - cleared + updated   Exercises   Knee AROM Long Arc Quad Sitting;10 reps;AROM; Bilateral   UE Exercise   (chair push-ups + shoulder flexion/extension 10 reps)   Marching Sitting;10 reps;AROM; Bilateral   Assessment   Prognosis Fair   Problem List Decreased strength; Impaired balance;Decreased endurance;Decreased mobility; Decreased cognition;Decreased safety awareness   Assessment Pt seen for PT treatment session w/ focus on t/f training, ther ex instruction, + gait training  Progress primarily impaired by symptomatic orthostatic hypotension + CGA required for t/f and ambulation  Pt did demonstrate some progress from IE as he was able to ambulate  From a PT standpoint continue to recommend rehab upon d/c    Barriers to Discharge Inaccessible home environment;Decreased caregiver support   Goals   Patient Goals to walk   PT Treatment Day 1   Plan   Treatment/Interventions LE strengthening/ROM; Functional transfer training;Elevations; Therapeutic exercise; Endurance training;Patient/family training;Equipment eval/education; Bed mobility;Gait training; Compensatory technique education;Spoke to nursing;OT  (balance training)   Progress Progressing toward goals   PT Frequency 3-5x/wk   Recommendation   PT Discharge Recommendation Post acute rehabilitation services   AM-PAC Basic Mobility Inpatient   Turning in Flat Bed Without Bedrails 4   Lying on Back to Sitting on Edge of Flat Bed Without Bedrails 3   Moving Bed to Chair 3   Standing Up From Chair Using Arms 3   Walk in Room 3   Climb 3-5 Stairs With Railing 1   Basic Mobility Inpatient Raw Score 17   Basic Mobility Standardized Score 39 67   Highest Level Of Mobility   JH-HLM Goal 5: Stand one or more mins   JH-HLM Achieved 7: Walk 25 feet or more   Education   Education Provided Mobility training;Home exercise program;Assistive device   Patient Demonstrates acceptance/verbal understanding;Reinforcement needed   End of Consult   Patient Position at End of Consult Bedside chair;Bed/Chair alarm activated; All needs within reach  (on waffle cushion, BLE elevated)     Nam Lang, PT, DPT

## 2023-01-12 LAB
ANION GAP SERPL CALCULATED.3IONS-SCNC: 4 MMOL/L (ref 4–13)
BUN SERPL-MCNC: 20 MG/DL (ref 5–25)
CALCIUM SERPL-MCNC: 9.1 MG/DL (ref 8.3–10.1)
CHLORIDE SERPL-SCNC: 110 MMOL/L (ref 96–108)
CO2 SERPL-SCNC: 27 MMOL/L (ref 21–32)
CREAT SERPL-MCNC: 1.11 MG/DL (ref 0.6–1.3)
ERYTHROCYTE [DISTWIDTH] IN BLOOD BY AUTOMATED COUNT: 15.9 % (ref 11.6–15.1)
GFR SERPL CREATININE-BSD FRML MDRD: 69 ML/MIN/1.73SQ M
GLUCOSE SERPL-MCNC: 106 MG/DL (ref 65–140)
GLUCOSE SERPL-MCNC: 137 MG/DL (ref 65–140)
GLUCOSE SERPL-MCNC: 258 MG/DL (ref 65–140)
GLUCOSE SERPL-MCNC: 68 MG/DL (ref 65–140)
GLUCOSE SERPL-MCNC: 85 MG/DL (ref 65–140)
HCT VFR BLD AUTO: 34 % (ref 36.5–49.3)
HGB BLD-MCNC: 10.4 G/DL (ref 12–17)
MCH RBC QN AUTO: 27.8 PG (ref 26.8–34.3)
MCHC RBC AUTO-ENTMCNC: 30.6 G/DL (ref 31.4–37.4)
MCV RBC AUTO: 91 FL (ref 82–98)
PLATELET # BLD AUTO: 199 THOUSANDS/UL (ref 149–390)
PMV BLD AUTO: 11.3 FL (ref 8.9–12.7)
POTASSIUM SERPL-SCNC: 4 MMOL/L (ref 3.5–5.3)
RBC # BLD AUTO: 3.74 MILLION/UL (ref 3.88–5.62)
SODIUM SERPL-SCNC: 141 MMOL/L (ref 135–147)
WBC # BLD AUTO: 9.05 THOUSAND/UL (ref 4.31–10.16)

## 2023-01-12 RX ORDER — INSULIN GLARGINE 100 [IU]/ML
12 INJECTION, SOLUTION SUBCUTANEOUS
Status: DISCONTINUED | OUTPATIENT
Start: 2023-01-12 | End: 2023-01-13 | Stop reason: HOSPADM

## 2023-01-12 RX ORDER — INSULIN LISPRO 100 [IU]/ML
5 INJECTION, SOLUTION INTRAVENOUS; SUBCUTANEOUS
Status: DISCONTINUED | OUTPATIENT
Start: 2023-01-13 | End: 2023-01-13 | Stop reason: HOSPADM

## 2023-01-12 RX ADMIN — MIDODRINE HYDROCHLORIDE 10 MG: 5 TABLET ORAL at 17:09

## 2023-01-12 RX ADMIN — INSULIN GLARGINE 12 UNITS: 100 INJECTION, SOLUTION SUBCUTANEOUS at 22:19

## 2023-01-12 RX ADMIN — CALCITRIOL CAPSULES 0.25 MCG 0.25 MCG: 0.25 CAPSULE ORAL at 08:23

## 2023-01-12 RX ADMIN — HYDROCORTISONE 20 MG: 20 TABLET ORAL at 08:23

## 2023-01-12 RX ADMIN — MIDODRINE HYDROCHLORIDE 10 MG: 5 TABLET ORAL at 11:46

## 2023-01-12 RX ADMIN — SERTRALINE 100 MG: 100 TABLET, FILM COATED ORAL at 08:23

## 2023-01-12 RX ADMIN — APIXABAN 5 MG: 5 TABLET, FILM COATED ORAL at 17:09

## 2023-01-12 RX ADMIN — ASPIRIN 81 MG: 81 TABLET, CHEWABLE ORAL at 08:22

## 2023-01-12 RX ADMIN — INSULIN LISPRO 7 UNITS: 100 INJECTION, SOLUTION INTRAVENOUS; SUBCUTANEOUS at 08:22

## 2023-01-12 RX ADMIN — HYDROCORTISONE 10 MG: 10 TABLET ORAL at 13:16

## 2023-01-12 RX ADMIN — FLUDROCORTISONE ACETATE 0.2 MG: 0.1 TABLET ORAL at 08:23

## 2023-01-12 RX ADMIN — VITAM B12 100 MCG: 100 TAB at 08:22

## 2023-01-12 RX ADMIN — APIXABAN 5 MG: 5 TABLET, FILM COATED ORAL at 08:22

## 2023-01-12 RX ADMIN — INSULIN LISPRO 2 UNITS: 100 INJECTION, SOLUTION INTRAVENOUS; SUBCUTANEOUS at 22:19

## 2023-01-12 RX ADMIN — POTASSIUM CHLORIDE 20 MEQ: 750 TABLET, EXTENDED RELEASE ORAL at 17:08

## 2023-01-12 RX ADMIN — POTASSIUM CHLORIDE 20 MEQ: 750 TABLET, EXTENDED RELEASE ORAL at 08:23

## 2023-01-12 RX ADMIN — MIDODRINE HYDROCHLORIDE 10 MG: 5 TABLET ORAL at 08:25

## 2023-01-12 RX ADMIN — RISPERIDONE 1 MG: 1 TABLET ORAL at 08:23

## 2023-01-12 RX ADMIN — RISPERIDONE 1 MG: 1 TABLET ORAL at 17:09

## 2023-01-12 RX ADMIN — ATORVASTATIN CALCIUM 10 MG: 10 TABLET, FILM COATED ORAL at 08:22

## 2023-01-12 RX ADMIN — INSULIN LISPRO 7 UNITS: 100 INJECTION, SOLUTION INTRAVENOUS; SUBCUTANEOUS at 11:47

## 2023-01-12 NOTE — PHYSICAL THERAPY NOTE
Physical Therapy treatment Note       01/12/23 0905   PT Last Visit   PT Visit Date 01/12/23   Note Type   Note Type Treatment   Pain Assessment   Pain Assessment Tool 0-10   Pain Score No Pain   Restrictions/Precautions   Weight Bearing Precautions Per Order No   Other Precautions Cognitive;Multiple lines;Telemetry; Fall Risk;Pain; Chair Alarm; Bed Alarm   General   Chart Reviewed Yes   Family/Caregiver Present No   Cognition   Overall Cognitive Status Impaired   Arousal/Participation Responsive   Attention Attends with cues to redirect   Orientation Level Oriented to person;Oriented to place   Memory Unable to assess   Following Commands Follows one step commands without difficulty   Subjective   Subjective states he feels OK  continued dizziness w/ mobility   Transfers   Sit to Stand 4  Minimal assistance   Additional items Assist x 1   Stand to Sit 4  Minimal assistance   Additional items Assist x 1   Toilet transfer 3  Moderate assistance   Additional items Assist x 1   Ambulation/Elevation   Gait pattern   (slow, ataxia, increased RW advancement, difficulty w/ obstacle avoidance )   Gait Assistance 4  Minimal assist   Additional items Assist x 1   Assistive Device Rolling walker   Distance 17'x2, standing rest x 2-3 min   Balance   Static Sitting Good   Dynamic Sitting Fair -   Static Standing Fair -   Dynamic Standing Poor +   Ambulatory Poor +   Endurance Deficit   Endurance Deficit Yes   Endurance Deficit Description fatigue, weakness, dizziness   Activity Tolerance   Activity Tolerance Patient limited by fatigue;Treatment limited secondary to medical complications (Comment)   Nurse Made Aware yes   Assessment   Prognosis Fair   Problem List Decreased strength;Decreased endurance; Impaired balance;Decreased mobility; Decreased coordination;Pain   Assessment Pt seen for session for setup, transfers including toliet ones, gait w/ rest time, repositioning  Cooperative but limited by dizziness, weakness   Is is significant fall risk, w/ several LOB throughout  Needs cues for RW mgmt/obstacle avoidance  given mobility decline, continue to lean towards rehab at d/c,  However, w/ increased family support, could potentilaly d/c home w/ support from family, home therapy  will follow for needs   Goals   Patient Goals to walk more   STG Expiration Date 01/21/23   PT Treatment Day 2   Plan   Treatment/Interventions Functional transfer training;LE strengthening/ROM; Therapeutic exercise; Endurance training;Patient/family training;Equipment eval/education;Gait training;Bed mobility   Progress Progressing toward goals   PT Frequency 3-5x/wk   Recommendation   PT Discharge Recommendation Post acute rehabilitation services  (see above for full recs)   AM-PAC Basic Mobility Inpatient   Turning in Flat Bed Without Bedrails 3   Lying on Back to Sitting on Edge of Flat Bed Without Bedrails 3   Moving Bed to Chair 3   Standing Up From Chair Using Arms 3   Walk in Room 3   Climb 3-5 Stairs With Railing 2   Basic Mobility Inpatient Raw Score 17   Basic Mobility Standardized Score 39 67   Highest Level Of Mobility   JH-HLM Goal 5: Stand one or more mins   JH-HLM Achieved 7: Walk 25 feet or more     Pedrito Christiansen PT, DPT CSRS

## 2023-01-12 NOTE — CASE MANAGEMENT
Case Management Discharge Planning Note    Patient name Michelle Potter  Location [facility] 831/[facility] 084-66 MRN [id number]  : 1955 Date 2023       Current Admission Date: 2023  Current Admission Diagnosis:Syncope   Patient Active Problem List    Diagnosis Date Noted   • Syncope 2023   • Acute encephalopathy 2022   • Unspecified protein-calorie malnutrition ([facility] [address] ) 2022   • Unintentional weight loss 2022   • Type 2 MI (myocardial infarction) ([facility] [address] ) 2022   • Orthostatic hypotension 2022   • Recurrent hypoglycemia 10/19/2022   • Insulin dependent type 1 diabetes mellitus  10/19/2022   • Adrenal insufficiency  10/19/2022   • Essential hypertension 10/19/2022   • Paroxysmal atrial fibrillation  10/19/2022   • History of stroke 10/19/2022   • Anemia of chronic disease 10/19/2022   • Psychiatric disorder 10/19/2022      LOS (days): 5  Geometric Mean LOS (GMLOS) (days): 2 30  Days to GMLOS:-2 7     OBJECTIVE:  Risk of Unplanned Readmission Score: 23 64         Current admission status: Inpatient   Preferred Pharmacy:   [address] [place] [address] 308 [facility] [address] 210 [facility]  Phone: [phone] Fax: [phone] [place] [address]  Phone: [phone] Fax: [phone]    Primary Care Provider: Lane Vargas MD    Primary Insurance: [address] [facility]  Secondary Insurance:     DISCHARGE DETAILS:                  Other Referral/Resources/Interventions Provided:  Referral Comments: VM left for pt's daughter Authur Argue for dates on covid vaccine card  [employer] message sent to [facility] to inquire status of referral           1072 update:  TC back from daughter  Copy of covid vaccine card was brought in yesterday  Located in pt's folder  Uploaded into [employer]

## 2023-01-12 NOTE — PROGRESS NOTES
Progress Note - Nikole Stokes 74 y o  male MRN: [id number]    Unit/Bed#: [facility] 831-01 Encounter: [id number]      CC: diabetes f/u    Subjective:   Nikole Stokes is a 74y o  year old male with type 1 diabetes and secondary adrenal insufficiency admitted for syncope  Feels well  No complaints  No hypoglycemia  Objective:     Vitals: Blood pressure (!) 73/42, pulse 80, temperature 97 5 °F (36 4 °C), resp  rate 16, height 5' 4" (1 626 m), weight 83 9 kg (185 lb), SpO2 98 %  ,Body mass index is 31 76 kg/m²  Intake/Output Summary (Last 24 hours) at 1/12/2023 1631  Last data filed at 1/12/2023 0804  Gross per 24 hour   Intake --   Output 850 ml   Net -850 ml       Physical Exam:  General Appearance: awake, appears stated age and cooperative  Head: Normocephalic, without obvious abnormality, atraumatic  Extremities: moves all extremities  Skin: Skin color and temperature normal    Pulm: no labored breathing    Lab, Imaging and other studies: I have personally reviewed pertinent reports  POC Glucose (mg/dl)   Date Value   01/12/2023 68   01/12/2023 137   01/12/2023 106   01/11/2023 175 (H)   01/11/2023 227 (H)   01/11/2023 143 (H)   01/11/2023 71   01/10/2023 178 (H)   01/10/2023 237 (H)   01/10/2023 176 (H)       Assessment and plan:  #T1DM  A1c 7 5  Home regimen Lantus 18 units daily at bedtime, Humalog 6 units 3 times daily with meals  Inpatient regimen Lantus 15 units daily at bedtime, Humalog 7 units 3 times daily with meals with correctional insulin  Recommendations: Decrease Lantus to 12 units daily at bedtime and Humalog to 5 units 3 times daily with meals given reduction in steroid dose to reduce risk of hypoglycemia  #Secondary adrenal insufficiency  Wean down hydrocortisone to home dose of hydrocortisone 15 mg every morning and 10 mg every afternoon    Portions of the record may have been created with voice recognition software

## 2023-01-12 NOTE — RESTORATIVE TECHNICIAN NOTE
Restorative Technician Note      Patient Name: Nikole Gerardo Tech Visit Date: 01/12/23  Note Type: Mobility  Patient Position Upon Consult: Bedside chair (patient attempting to stand from bedside chair; responding to chair alarm)  Activity Performed: Ambulated (to and from BR)  Assistive Device: Other (Comment) (HHA)  Patient Position at End of Consult: Bedside chair;  All needs within reach; Bed/Chair alarm activated    Josette Rochach  DPT, Restorative Technician

## 2023-01-12 NOTE — PLAN OF CARE
Problem: PAIN - ADULT  Goal: Verbalizes/displays adequate comfort level or baseline comfort level  Description: Interventions:  - Encourage patient to monitor pain and request assistance  - Assess pain using appropriate pain scale  - Administer analgesics based on type and severity of pain and evaluate response  - Implement non-pharmacological measures as appropriate and evaluate response  - Consider cultural and social influences on pain and pain management  - Notify physician/advanced practitioner if interventions unsuccessful or patient reports new pain  Outcome: Progressing     Problem: INFECTION - ADULT  Goal: Absence or prevention of progression during hospitalization  Description: INTERVENTIONS:  - Assess and monitor for signs and symptoms of infection  - Monitor lab/diagnostic results  - Monitor all insertion sites, i e  indwelling lines, tubes, and drains  - Monitor endotracheal if appropriate and nasal secretions for changes in amount and color  - Deal appropriate cooling/warming therapies per order  - Administer medications as ordered  - Instruct and encourage patient and family to use good hand hygiene technique  - Identify and instruct in appropriate isolation precautions for identified infection/condition  Outcome: Progressing

## 2023-01-12 NOTE — CASE MANAGEMENT
Case Management Discharge Planning Note    Patient name Alan Ornelas  Location [facility] 831/[facility] 718-61 MRN [id number]  : 1955 Date 2023       Current Admission Date: 2023  Current Admission Diagnosis:Syncope   Patient Active Problem List    Diagnosis Date Noted   • Syncope 2023   • Acute encephalopathy 2022   • Unspecified protein-calorie malnutrition ([facility] [address] ) 2022   • Unintentional weight loss 2022   • Type 2 MI (myocardial infarction) ([facility] [address] ) 2022   • Orthostatic hypotension 2022   • Recurrent hypoglycemia 10/19/2022   • Insulin dependent type 1 diabetes mellitus  10/19/2022   • Adrenal insufficiency  10/19/2022   • Essential hypertension 10/19/2022   • Paroxysmal atrial fibrillation  10/19/2022   • History of stroke 10/19/2022   • Anemia of chronic disease 10/19/2022   • Psychiatric disorder 10/19/2022      LOS (days): 5  Geometric Mean LOS (GMLOS) (days): 2 30  Days to GMLOS:-2 8     OBJECTIVE:  Risk of Unplanned Readmission Score: 23 69         Current admission status: Inpatient   Preferred Pharmacy:   [address] [place] [address] 308 [facility] [address] 210 [facility]  Phone: [phone] Fax: [phone] [place] [address]  Phone: [phone] Fax: [phone]    Primary Care Provider: Adan Quiñonez MD    Primary Insurance: [facility] [address] [facility]  Secondary Insurance:     DISCHARGE DETAILS:                                          Other Referral/Resources/Interventions Provided:  Referral Comments: [facility] message from [facility] & they cannot accept  Updated by CASSANDRA who s/w pt's daughter  Plan is for pt to go home tomorrow with vna  CM called Sonia [phone] & confirmed plan  IMM reviewed again  [facility] message sent to [facility] to update  They were already reserved               IMM Given (Date):: 23  IMM Given to[de-identified] Family  Family notified[de-identified] daughter Adriane Melchorcastillo

## 2023-01-12 NOTE — PROGRESS NOTES
14[facility]  Progress Note - Gayla Dominguez 1955, 74 y o  male MRN: [id number]  Unit/Bed#: [facility] 831-01 Encounter: [id number]  Primary Care Provider: Monster Hagan MD   Date and time admitted to hospital: 1/6/2023 10:30 AM    Adrenal insufficiency   Assessment & Plan  · Labs with endocrine outpatient  · Continue Florinef 0 2 mg daily  · On hydrocortisone 15 mg in a m  and 10 mg in evening  Endocrine adjusting to hydrocortisone to 20 mg qam and 10mg qpm with   2mg florinef daily  Appreciate their expertise    Orthostatic hypotension  Assessment & Plan  · History of such  · Current home regimen -Florinef 0 2 daily, hydrocortisone 15 mg in a m  and 10 mg in p m , midodrine 10 mg 3 times daily  · Given persistent and recurrent orthostatic hypotension, endocrine consulted and regimen adjusted  · Seems like despite this regimen he was having orthostatic hypotension and near syncope  · Has had lost 4 pounds in a few days  · For now we will continue the bowel regimen, apply stockings, abdominal binder and check orthostatics qshift  · Cardiology follwoing  · IVF    Psychiatric disorder  Assessment & Plan  · Continue Risperidone and Zoloft at home dose    Anemia of chronic disease  Assessment & Plan  · Baseline seems to be between 8-10  · currently at baseline      History of stroke  Assessment & Plan  · Aspirin, statin, Eliquis    Paroxysmal atrial fibrillation   Assessment & Plan  · Hold metoprolol due to severe orthostatic hypotension    · Continue Eliquis    Insulin dependent type 1 diabetes mellitus   Assessment & Plan  Lab Results   Component Value Date    HGBA1C 7 5 (H) 07/22/2022       Recent Labs     01/11/23  1132 01/11/23  1603 01/11/23  2115 01/12/23  0803   POCGLU 143* 227* 175* 106       Blood Sugar Average: Last 72 hrs:  (P) 160 [id number]     ·  home regimen adjusted to Lantus 15 units at bedtime, Humalog  units 3 times daily  · Increased dose slightly given hyperglycemia  · Continue ISS  · Follow-up with endocrine OP  · Hypoglycemia protocol    * Syncope  Assessment & Plan  · Syncope/near syncope x2-3  · Also had another similar episode this morning while working with physical therapy which was likely related to orthostatic hypotension as well and his orthostatic vitals were positive for hypotension     · Most likely suspect secondary to orthostatic hypotension only  · EKG - NSR  Troponin negative  · Ct head: No acute intracranial CT abnormality  Stable chronic bilateral gangliocapsular infarcts  Chronic microangiopathy  Right maxillary sinus mucosal thickening containing hyperattenuating foci may indicate inspissated secretions versus fungal colonization  · Lost 4 lb in few days - ? If that contributing  He is scheduled to get EGD and colonoscopy end of this month  · Echo shows ef of 65% with grade 1 DD and no aortic stenosis,    PLAN  · Cardiology following  · Hold metoprolol for now  · Continue current dose of Florinef and midodrine  · PT OT evaluation>> recommend rehab    VTE Pharmacologic Prophylaxis:   Moderate Risk (Score 3-4) - Pharmacological DVT Prophylaxis Ordered: apixaban (Eliquis)  Patient Centered Rounds: I performed bedside rounds with nursing staff today  Discussions with Specialists or Other Care Team Provider: N/A    Education and Discussions with Family / Patient: Updated  (daughter) via phone  Time Spent for Care: 45 minutes  More than 50% of total time spent on counseling and coordination of care as described above  Current Length of Stay: 5 day(s)  Current Patient Status: Inpatient   Certification Statement: The patient will continue to require additional inpatient hospital stay due to rehab assessment and discharge planning  Discharge Plan: Anticipate discharge tomorrow to home with home services  Code Status: Level 1 - Full Code    Subjective:   Seen and examined at bedside this morning   Penelope Upton is sitting in a chair at bedside and states he feels well  Endorses some orthostatic dizziness which is in line with his baseline  No new complaints  Discussed with daughter today on phone, pt was declined for [facility] rehab, so tentative plan is dc home tomorrow with VNA referral    Objective:     Vitals:   Temp (24hrs), Av 2 °F (36 2 °C), Min:97 °F (36 1 °C), Max:97 3 °F (36 3 °C)    Temp:  [97 °F (36 1 °C)-97 3 °F (36 3 °C)] 97 °F (36 1 °C)  HR:  [80] 80  Resp:  [14-18] 16  BP: ()/(44-87) 145/79  SpO2:  [97 %-99 %] 99 %  Body mass index is 31 76 kg/m²  Input and Output Summary (last 24 hours): Intake/Output Summary (Last 24 hours) at 2023 1425  Last data filed at 2023 0804  Gross per 24 hour   Intake --   Output 850 ml   Net -850 ml       Physical Exam:   Physical Exam  Vitals reviewed  Constitutional:       Appearance: Normal appearance  HENT:      Head: Normocephalic and atraumatic  Eyes:      General: No scleral icterus  Pupils: Pupils are equal, round, and reactive to light  Cardiovascular:      Rate and Rhythm: Normal rate and regular rhythm  Pulses: Normal pulses  Heart sounds: Normal heart sounds  No murmur heard  No friction rub  No gallop  Pulmonary:      Effort: Pulmonary effort is normal  No respiratory distress  Breath sounds: Normal breath sounds  No wheezing or rales  Abdominal:      General: Abdomen is flat  Bowel sounds are normal  There is no distension  Palpations: Abdomen is soft  Tenderness: There is no abdominal tenderness  Comments: Abdominal binder off at the time of exam   Musculoskeletal:      Right lower leg: No edema  Left lower leg: No edema  Skin:     General: Skin is warm and dry  Capillary Refill: Capillary refill takes less than 2 seconds  Findings: No rash  Neurological:      General: No focal deficit present  Mental Status: He is alert and oriented to person, place, and time        Cranial Nerves: No cranial nerve deficit  Sensory: No sensory deficit  Psychiatric:         Mood and Affect: Mood normal          Behavior: Behavior normal           Additional Data:     Labs:  Results from last 7 days   Lab Units 01/12/23  0556 01/10/23  0534 01/09/23  0604   WBC Thousand/uL 9 05   < > 8 17   HEMOGLOBIN g/dL 10 4*   < > 10 4*   HEMATOCRIT % 34 0*   < > 32 3*   PLATELETS Thousands/uL 199   < > 228   NEUTROS PCT %  --   --  71   LYMPHS PCT %  --   --  16   MONOS PCT %  --   --  11   EOS PCT %  --   --  1    < > = values in this interval not displayed  Results from last 7 days   Lab Units 01/12/23  0556 01/08/23  0526 01/06/23  1053   SODIUM mmol/L 141   < > 140   POTASSIUM mmol/L 4 0   < > 3 7   CHLORIDE mmol/L 110*   < > 112*   CO2 mmol/L 27   < > 25   BUN mg/dL 20   < > 18   CREATININE mg/dL 1 11   < > 1 26   ANION GAP mmol/L 4   < > 3*   CALCIUM mg/dL 9 1   < > 8 5   ALBUMIN g/dL  --   --  3 1*   TOTAL BILIRUBIN mg/dL  --   --  0 32   ALK PHOS U/L  --   --  76   ALT U/L  --   --  13   AST U/L  --   --  8   GLUCOSE RANDOM mg/dL 85   < > 123    < > = values in this interval not displayed           Results from last 7 days   Lab Units 01/12/23  1118 01/12/23  0803 01/11/23  2115 01/11/23  1603 01/11/23  1132 01/11/23  0758 01/10/23  2056 01/10/23  1559 01/10/23  1101 01/10/23  0735 01/09/23  2108 01/09/23  1622   POC GLUCOSE mg/dl 137 106 175* 227* 143* 71 178* 237* 176* 114 124 207*               Lines/Drains:  Invasive Devices     Peripheral Intravenous Line  Duration           Peripheral IV 01/08/23 Left Forearm 3 days                      Imaging: Reviewed radiology reports from this admission including: CT head    Recent Cultures (last 7 days):         Last 24 Hours Medication List:   Current Facility-Administered Medications   Medication Dose Route Frequency Provider Last Rate   • apixaban  5 mg Oral BID Belgica Ennis MD     • aspirin  81 mg Oral Daily Belgica Ennis MD     • atorvastatin 10 mg Oral Daily Delana Aase, MD     • calcitriol  0 25 mcg Oral Daily Delana Aase, MD     • cyanocobalamin  100 mcg Oral Daily Delana Aase, MD     • fludrocortisone  0 2 mg Oral Daily Delana Aase, MD     • hydrocortisone  10 mg Oral Q24H Citlalli Antoine MD     • hydrocortisone  20 mg Oral QAM Citlalli Antoine MD     • insulin glargine  15 Units Subcutaneous HS Leticia Hilario MD     • insulin lispro  1-5 Units Subcutaneous TID Yamil Cox MD     • insulin lispro  1-5 Units Subcutaneous HS Delana Aase, MD     • insulin lispro  7 Units Subcutaneous TID With Meals Myles Copeland MD     • midodrine  10 mg Oral TID AC Delana Aase, MD     • potassium chloride  20 mEq Oral BID Delana Aase, MD     • risperiDONE  1 mg Oral BID Delana Aase, MD     • sertraline  100 mg Oral Daily Delana Aase, MD          Today, Patient Was Seen By: Nam Bryan    **Please Note: This note may have been constructed using a voice recognition system  **

## 2023-01-12 NOTE — ASSESSMENT & PLAN NOTE
Lab Results   Component Value Date    HGBA1C 7 5 (H) 07/22/2022       Recent Labs     01/11/23  1132 01/11/23  1603 01/11/23  2115 01/12/23  0803   POCGLU 143* 227* 175* 106       Blood Sugar Average: Last 72 hrs:  (P) 160 [id number]     ·  home regimen adjusted to Lantus 15 units at bedtime, Humalog  units 3 times daily  · Increased dose slightly given hyperglycemia  · Continue ISS  · Follow-up with endocrine OP  · Hypoglycemia protocol

## 2023-01-12 NOTE — PLAN OF CARE
Problem: PHYSICAL THERAPY ADULT  Goal: Performs mobility at highest level of function for planned discharge setting  See evaluation for individualized goals  Description: Treatment/Interventions: Functional transfer training, LE strengthening/ROM, Elevations, Therapeutic exercise, Endurance training, Cognitive reorientation, Patient/family training, Equipment eval/education, Bed mobility, Gait training          See flowsheet documentation for full assessment, interventions and recommendations  Outcome: Progressing  Note: Prognosis: Fair  Problem List: Decreased strength, Decreased endurance, Impaired balance, Decreased mobility, Decreased coordination, Pain  Assessment: Pt seen for session for setup, transfers including toliet ones, gait w/ rest time, repositioning  Cooperative but limited by dizziness, weakness  Is is significant fall risk, w/ several LOB throughout  Needs cues for RW mgmt/obstacle avoidance  given mobility decline, continue to lean towards rehab at d/c,  However, w/ increased family support, could potentilaly d/c home w/ support from family, home therapy  will follow for needs  Barriers to Discharge: Inaccessible home environment, Decreased caregiver support     PT Discharge Recommendation: Post acute rehabilitation services (see above for full recs)    See flowsheet documentation for full assessment

## 2023-01-13 VITALS
RESPIRATION RATE: 17 BRPM | HEIGHT: 64 IN | DIASTOLIC BLOOD PRESSURE: 73 MMHG | TEMPERATURE: 97.3 F | HEART RATE: 73 BPM | BODY MASS INDEX: 31.58 KG/M2 | SYSTOLIC BLOOD PRESSURE: 101 MMHG | OXYGEN SATURATION: 99 % | WEIGHT: 185 LBS

## 2023-01-13 LAB
GLUCOSE SERPL-MCNC: 164 MG/DL (ref 65–140)
GLUCOSE SERPL-MCNC: 201 MG/DL (ref 65–140)
GLUCOSE SERPL-MCNC: 242 MG/DL (ref 65–140)

## 2023-01-13 RX ORDER — MIDODRINE HYDROCHLORIDE 10 MG/1
10 TABLET ORAL
Qty: 90 TABLET | Refills: 0 | Status: SHIPPED | OUTPATIENT
Start: 2023-01-13

## 2023-01-13 RX ORDER — INSULIN LISPRO 100 [IU]/ML
5 INJECTION, SOLUTION INTRAVENOUS; SUBCUTANEOUS
Qty: 15 ML | Refills: 0
Start: 2023-01-13

## 2023-01-13 RX ORDER — INSULIN GLARGINE 300 U/ML
18 INJECTION, SOLUTION SUBCUTANEOUS
Qty: 15 ML | Refills: 0
Start: 2023-01-13

## 2023-01-13 RX ADMIN — VITAM B12 100 MCG: 100 TAB at 08:00

## 2023-01-13 RX ADMIN — INSULIN LISPRO 1 UNITS: 100 INJECTION, SOLUTION INTRAVENOUS; SUBCUTANEOUS at 07:56

## 2023-01-13 RX ADMIN — APIXABAN 5 MG: 5 TABLET, FILM COATED ORAL at 08:00

## 2023-01-13 RX ADMIN — MIDODRINE HYDROCHLORIDE 10 MG: 5 TABLET ORAL at 06:00

## 2023-01-13 RX ADMIN — RISPERIDONE 1 MG: 1 TABLET ORAL at 08:00

## 2023-01-13 RX ADMIN — POTASSIUM CHLORIDE 20 MEQ: 750 TABLET, EXTENDED RELEASE ORAL at 08:00

## 2023-01-13 RX ADMIN — ATORVASTATIN CALCIUM 10 MG: 10 TABLET, FILM COATED ORAL at 08:00

## 2023-01-13 RX ADMIN — ASPIRIN 81 MG: 81 TABLET, CHEWABLE ORAL at 08:00

## 2023-01-13 RX ADMIN — SERTRALINE 100 MG: 100 TABLET, FILM COATED ORAL at 08:00

## 2023-01-13 RX ADMIN — HYDROCORTISONE 10 MG: 10 TABLET ORAL at 14:37

## 2023-01-13 RX ADMIN — CALCITRIOL CAPSULES 0.25 MCG 0.25 MCG: 0.25 CAPSULE ORAL at 08:00

## 2023-01-13 RX ADMIN — FLUDROCORTISONE ACETATE 0.2 MG: 0.1 TABLET ORAL at 08:02

## 2023-01-13 RX ADMIN — INSULIN LISPRO 2 UNITS: 100 INJECTION, SOLUTION INTRAVENOUS; SUBCUTANEOUS at 12:02

## 2023-01-13 RX ADMIN — HYDROCORTISONE 15 MG: 10 TABLET ORAL at 08:01

## 2023-01-13 RX ADMIN — INSULIN LISPRO 5 UNITS: 100 INJECTION, SOLUTION INTRAVENOUS; SUBCUTANEOUS at 12:02

## 2023-01-13 RX ADMIN — INSULIN LISPRO 5 UNITS: 100 INJECTION, SOLUTION INTRAVENOUS; SUBCUTANEOUS at 07:56

## 2023-01-13 RX ADMIN — MIDODRINE HYDROCHLORIDE 10 MG: 5 TABLET ORAL at 12:02

## 2023-01-13 NOTE — ASSESSMENT & PLAN NOTE
Present on arrival  Likely related to Type 1 diabetes w diabetic autonomic neuropathy and known adrenal insufficiency   · Current home regimen -Florinef 0 2 daily, hydrocortisone 15 mg in a m  and 10 mg in p m , midodrine 10 mg 3 times daily      · Given persistent and recurrent orthostatic hypotension, endocrine consulted and regimen adjusted  · Seems like despite this regimen he was having orthostatic hypotension and near syncope  · Generally stable, but did have a fall today, discussed with daughter to see if she would still prefer to take patient home or aim for rehab

## 2023-01-13 NOTE — PLAN OF CARE
Problem: PAIN - ADULT  Goal: Verbalizes/displays adequate comfort level or baseline comfort level  Description: Interventions:  - Encourage patient to monitor pain and request assistance  - Assess pain using appropriate pain scale  - Administer analgesics based on type and severity of pain and evaluate response  - Implement non-pharmacological measures as appropriate and evaluate response  - Consider cultural and social influences on pain and pain management  - Notify physician/advanced practitioner if interventions unsuccessful or patient reports new pain  Outcome: Progressing     Problem: INFECTION - ADULT  Goal: Absence or prevention of progression during hospitalization  Description: INTERVENTIONS:  - Assess and monitor for signs and symptoms of infection  - Monitor lab/diagnostic results  - Monitor all insertion sites, i e  indwelling lines, tubes, and drains  - Monitor endotracheal if appropriate and nasal secretions for changes in amount and color  - [place] appropriate cooling/warming therapies per order  - Administer medications as ordered  - Instruct and encourage patient and family to use good hand hygiene technique  - Identify and instruct in appropriate isolation precautions for identified infection/condition  Outcome: Progressing     Problem: DISCHARGE PLANNING  Goal: Discharge to home or other facility with appropriate resources  Description: INTERVENTIONS:  - Identify barriers to discharge w/patient and caregiver  - Arrange for needed discharge resources and transportation as appropriate  - Identify discharge learning needs (meds, wound care, etc )  - Arrange for interpretive services to assist at discharge as needed  - Refer to Case Management Department for coordinating discharge planning if the patient needs post-hospital services based on physician/advanced practitioner order or complex needs related to functional status, cognitive ability, or social support system  Outcome: Progressing Problem: CARDIOVASCULAR - ADULT  Goal: Maintains optimal cardiac output and hemodynamic stability  Description: INTERVENTIONS:  - Monitor I/O, vital signs and rhythm  - Monitor for S/S and trends of decreased cardiac output  - Administer and titrate ordered vasoactive medications to optimize hemodynamic stability  - Assess quality of pulses, skin color and temperature  - Assess for signs of decreased coronary artery perfusion  - Instruct patient to report change in severity of symptoms  Outcome: Progressing  Goal: Absence of cardiac dysrhythmias or at baseline rhythm  Description: INTERVENTIONS:  - Continuous cardiac monitoring, vital signs, obtain 12 lead EKG if ordered  - Administer antiarrhythmic and heart rate control medications as ordered  - Monitor electrolytes and administer replacement therapy as ordered  Outcome: Progressing     Problem: GASTROINTESTINAL - ADULT  Goal: Minimal or absence of nausea and/or vomiting  Description: INTERVENTIONS:  - Administer IV fluids if ordered to ensure adequate hydration  - Maintain NPO status until nausea and vomiting are resolved  - Nasogastric tube if ordered  - Administer ordered antiemetic medications as needed  - Provide nonpharmacologic comfort measures as appropriate  - Advance diet as tolerated, if ordered  - Consider nutrition services referral to assist patient with adequate nutrition and appropriate food choices  Outcome: Progressing  Goal: Maintains or returns to baseline bowel function  Description: INTERVENTIONS:  - Assess bowel function  - Encourage oral fluids to ensure adequate hydration  - Administer IV fluids if ordered to ensure adequate hydration  - Administer ordered medications as needed  - Encourage mobilization and activity  - Consider nutritional services referral to assist patient with adequate nutrition and appropriate food choices  Outcome: Progressing  Goal: Maintains adequate nutritional intake  Description: INTERVENTIONS:  - Monitor percentage of each meal consumed  - Identify factors contributing to decreased intake, treat as appropriate  - Assist with meals as needed  - Monitor I&O, weight, and lab values if indicated  - Obtain nutrition services referral as needed  Outcome: Progressing     Problem: MOBILITY - ADULT  Goal: Maintain or return to baseline ADL function  Description: INTERVENTIONS:  -  Assess patient's ability to carry out ADLs; assess patient's baseline for ADL function and identify physical deficits which impact ability to perform ADLs (bathing, care of mouth/teeth, toileting, grooming, dressing, etc )  - Assess/evaluate cause of self-care deficits   - Assess range of motion  - Assess patient's mobility; develop plan if impaired  - Assess patient's need for assistive devices and provide as appropriate  - Encourage maximum independence but intervene and supervise when necessary  - Involve family in performance of ADLs  - Assess for home care needs following discharge   - Consider OT consult to assist with ADL evaluation and planning for discharge  - Provide patient education as appropriate  Outcome: Progressing  Goal: Maintains/Returns to pre admission functional level  Description: INTERVENTIONS:  - Perform BMAT or MOVE assessment daily    - Set and communicate daily mobility goal to care team and patient/family/caregiver     - Collaborate with rehabilitation services on mobility goals if consulted  - Out of bed for toileting  - Record patient progress and toleration of activity level   Outcome: Progressing

## 2023-01-13 NOTE — CASE MANAGEMENT
Case Management Discharge Planning Note    Patient name Rogelio Hoff  Location [facility] 831/[facility] 472-10 MRN [id number]  : 1955 Date 2023       Current Admission Date: 2023  Current Admission Diagnosis:Syncope   Patient Active Problem List    Diagnosis Date Noted   • Syncope 2023   • Acute encephalopathy 2022   • Unspecified protein-calorie malnutrition ([facility] [address] ) 2022   • Unintentional weight loss 2022   • Type 2 MI (myocardial infarction) ([facility] [address] ) 2022   • Orthostatic hypotension 2022   • Recurrent hypoglycemia 10/19/2022   • Insulin dependent type 1 diabetes mellitus  10/19/2022   • Adrenal insufficiency  10/19/2022   • Essential hypertension 10/19/2022   • Paroxysmal atrial fibrillation  10/19/2022   • History of stroke 10/19/2022   • Anemia of chronic disease 10/19/2022   • Psychiatric disorder 10/19/2022      LOS (days): 6  Geometric Mean LOS (GMLOS) (days): 2 60  Days to GMLOS:-3 5     OBJECTIVE:  Risk of Unplanned Readmission Score: 24 01         Current admission status: Inpatient   Preferred Pharmacy:   [address] [place] [address] 38 210 [facility]  Phone: [phone] Fax: [phone] [address]  [address] [place] [address]  Phone: [phone] Fax: [phone]    Primary Care Provider: Catherine Mcqueen MD    Primary Insurance: [facility]  Secondary Insurance:     DISCHARGE DETAILS:                                          Other Referral/Resources/Interventions Provided:  Referral Comments: Informed by SLIM he s/w pt's daugher & he will go home with VNA  TC from [place] at [facility] & aware pt will discharge  AIDIN message sent as well  AVS sent via epic

## 2023-01-13 NOTE — PLAN OF CARE
Problem: PAIN - ADULT  Goal: Verbalizes/displays adequate comfort level or baseline comfort level  Description: Interventions:  - Encourage patient to monitor pain and request assistance  - Assess pain using appropriate pain scale  - Administer analgesics based on type and severity of pain and evaluate response  - Implement non-pharmacological measures as appropriate and evaluate response  - Consider cultural and social influences on pain and pain management  - Notify physician/advanced practitioner if interventions unsuccessful or patient reports new pain  Outcome: Progressing     Problem: INFECTION - ADULT  Goal: Absence or prevention of progression during hospitalization  Description: INTERVENTIONS:  - Assess and monitor for signs and symptoms of infection  - Monitor lab/diagnostic results  - Monitor all insertion sites, i e  indwelling lines, tubes, and drains  - Monitor endotracheal if appropriate and nasal secretions for changes in amount and color  - [place] appropriate cooling/warming therapies per order  - Administer medications as ordered  - Instruct and encourage patient and family to use good hand hygiene technique  - Identify and instruct in appropriate isolation precautions for identified infection/condition  Outcome: Progressing     Problem: DISCHARGE PLANNING  Goal: Discharge to home or other facility with appropriate resources  Description: INTERVENTIONS:  - Identify barriers to discharge w/patient and caregiver  - Arrange for needed discharge resources and transportation as appropriate  - Identify discharge learning needs (meds, wound care, etc )  - Arrange for interpretive services to assist at discharge as needed  - Refer to Case Management Department for coordinating discharge planning if the patient needs post-hospital services based on physician/advanced practitioner order or complex needs related to functional status, cognitive ability, or social support system  Outcome: Progressing

## 2023-01-13 NOTE — QUICK NOTE
Patient with fall around 1000, see nursing note from earlier  Bull Buckner states he stood up and quickly fell  No LOC or injury/head strike  He was not wearing his abdominal binder and states that he knows he should be  Also discussed orthostatic teaching habits such as waiting for BP to equilibrate before standing, and Rony states he knows he should do that as well  Discussed with daughter Mu Veronica who is aware and still wishes to bring patient home today/ On reassessment, Bull Buckner appears at baseline   No complaints

## 2023-01-13 NOTE — POST FALL NOTE
Post Fall Note      Brief Description of Events  [place] patient fall from hallway, went to room and found patient by the window sitting on the floor  Patient states he got up by himself, denies head strike  SLIM provider notified  Last Recorded Vitals  Blood pressure 98/73, pulse 74, temperature (!) 96 8 °F (36 °C), temperature source Temporal, resp  rate 16, height 5' 4" (1 626 m), weight 83 9 kg (185 lb), SpO2 99 %        Principal Problem:    Syncope  Active Problems:    Insulin dependent type 1 diabetes mellitus     Adrenal insufficiency     Paroxysmal atrial fibrillation     History of stroke    Anemia of chronic disease    Psychiatric disorder    Orthostatic hypotension

## 2023-01-13 NOTE — ASSESSMENT & PLAN NOTE
· Syncope/near syncope x2-3  · Also had another similar episode this morning while working with physical therapy which was likely related to orthostatic hypotension as well and his orthostatic vitals were positive for hypotension     · Most likely suspect secondary to orthostatic hypotension only  · EKG - NSR  Troponin negative  · Ct head: No acute intracranial CT abnormality  Stable chronic bilateral gangliocapsular infarcts  Chronic microangiopathy  Right maxillary sinus mucosal thickening containing hyperattenuating foci may indicate inspissated secretions versus fungal colonization  · Lost 4 lb in few days - ? If that contributing    He is scheduled to get EGD and colonoscopy end of this month  · Echo shows ef of 65% with grade 1 DD and no aortic stenosis,

## 2023-01-13 NOTE — ASSESSMENT & PLAN NOTE
Lab Results   Component Value Date    HGBA1C 7 5 (H) 07/22/2022       Recent Labs     01/12/23  1118 01/12/23  1608 01/12/23  2113 01/13/23  0731   POCGLU 137 68 258* 201*       Blood Sugar Average: Last 72 hrs:  (P) 904 [id number]     ·  home regimen adjusted to Lantus 15 units at bedtime, Humalog  units 3 times daily  · Increased dose slightly given hyperglycemia  · Continue ISS  · Follow-up with endocrine OP  · Hypoglycemia protocol

## 2023-01-14 NOTE — PROGRESS NOTES
[address] [facility]  Progress Note - Richter Goods 1955, 74 y o  male MRN: [id number]  [facility] Encounter: [id number]  Primary Care Provider: Edu Dorado MD   Date and time admitted to hospital: 1/6/2023 10:30 AM    Adrenal insufficiency   Assessment & Plan  · Labs with endocrine outpatient  · Continue Florinef 0 2 mg daily  · On hydrocortisone 15 mg in a m  and 10 mg in evening  Endocrine adjusting to hydrocortisone to 20 mg qam and 10mg qpm with   2mg florinef daily  Appreciate their expertise    Orthostatic hypotension  Assessment & Plan  Present on arrival  Likely related to Type 1 diabetes w diabetic autonomic neuropathy and known adrenal insufficiency   · Current home regimen -Florinef 0 2 daily, hydrocortisone 15 mg in a m  and 10 mg in p m , midodrine 10 mg 3 times daily  · Given persistent and recurrent orthostatic hypotension, endocrine consulted and regimen adjusted  · Seems like despite this regimen he was having orthostatic hypotension and near syncope  · Generally stable, but did have a fall today, discussed with daughter to see if she would still prefer to take patient home or aim for rehab    Psychiatric disorder  Assessment & Plan  · Continue Risperidone and Zoloft at home dose    Anemia of chronic disease  Assessment & Plan  · Baseline seems to be between 8-10  · currently at baseline      History of stroke  Assessment & Plan  · Aspirin, statin, Eliquis    Paroxysmal atrial fibrillation   Assessment & Plan  · Hold metoprolol due to severe orthostatic hypotension    · Continue Eliquis    Insulin dependent type 1 diabetes mellitus   Assessment & Plan  Lab Results   Component Value Date    HGBA1C 7 5 (H) 07/22/2022       Recent Labs     01/12/23  1118 01/12/23  1608 01/12/23  2113 01/13/23  0731   POCGLU 137 68 258* 201*       Blood Sugar Average: Last 72 hrs:  (P) 374 [id number]     ·  home regimen adjusted to Lantus 15 units at bedtime, Humalog units 3 times daily  · Increased dose slightly given hyperglycemia  · Continue ISS  · Follow-up with endocrine OP  · Hypoglycemia protocol    * Syncope  Assessment & Plan  · Syncope/near syncope x2-3  · Also had another similar episode this morning while working with physical therapy which was likely related to orthostatic hypotension as well and his orthostatic vitals were positive for hypotension     · Most likely suspect secondary to orthostatic hypotension only  · EKG - NSR  Troponin negative  · Ct head: No acute intracranial CT abnormality  Stable chronic bilateral gangliocapsular infarcts  Chronic microangiopathy  Right maxillary sinus mucosal thickening containing hyperattenuating foci may indicate inspissated secretions versus fungal colonization  · Lost 4 lb in few days - ? If that contributing  He is scheduled to get EGD and colonoscopy end of this month  · Echo shows ef of 65% with grade 1 DD and no aortic stenosis,      Medical Problems     Resolved Problems  Date Reviewed: 1/13/2023   None       Discharging Physician / Practitioner: Roman San  PCP: Miquel Denver, MD  Admission Date:   Admission Orders (From admission, onward)     Ordered        01/07/23 1221  Inpatient Admission  Once            01/06/23 1337  Place in Observation  Once                      Discharge Date: 01/13/23    Consultations During Hospital Stay:  · Endocrinology  · Cardiology    Procedures Performed:   · N/A    Significant Findings / Test Results:   · CT head with "No acute intracranial CT abnormality  Stable chronic bilateral gangliocapsular infarcts  Chronic microangiopathy  Right maxillary sinus mucosal thickening containing hyperattenuating foci may indicate inspissated secretions versus fungal colonization      Test Results Pending at Discharge (will require follow up):   · N/A     Outpatient Tests Requested:  · N/A    Complications:  In-Hospital Floor on 1/13/23    [place] [facility]    [place]:   Alan Ornelas is a 74 y o  male patient who originally presented to the hospital on 1/6/2023 due to syncope and fall  He has a known history of CVA, orthostatic hypotension, diabetes, and adrenal insufficiency  He was presenting for multiple falls with hypotension at home  In the ED a CT did not show any acute new findings and labs are generally unremarkable  He takes medications that may have contributed to the fall such as metoprolol  EKG did not appear to demonstrate any acute ischemic findings  Cardiology and endocrinology were consulted  Cardiology recommended echocardiogram, felt that falls were related to orthostatic hypotension alone  They recommended discontinuation of beta-blocker  Echocardiogram obtained did not show any new structural cardiac defects  Endocrinology adjusted the patient's insulin as well as his steroid therapy  Patient was relatively stable in the hospital with his abdominal binder on so long as he was taking midodrine, Florinef, and hydrocortisone  He would continue to have episodes of orthostatic hypotension however this is his baseline, and likely is not something that could be definitively treated with medical therapy  Patient was seen by PT and they deemed him to need rehab, unfortunately there were no suitable rehab facility so ultimately the patient's daughter opted to take the patient home with a home therapy referral   On the day of discharge, the patient unfortunately had an in-hospital fall  He stated that he stood up and fell instantly  He was not wearing his abdominal binder, and he knows he should have allowed his pressures to equilibrate after positional changes  I discussed this with the patient's daughter, she states the patient knows all these lifestyle and behavioral modifications, however given his CVA history he tends to forget    Patient's daughter still opted to take the patient home on 1/13/2023      Please see above list of diagnoses and related plan for additional information  Condition at Discharge: good    Discharge Day Visit / Exam:   Subjective:  No acute distress  Vitals: Blood Pressure: 101/73 (01/13/23 1543)  Pulse: 73 (01/13/23 1543)  Temperature: (!) 97 3 °F (36 3 °C) (01/13/23 1543)  Temp Source: Temporal (01/13/23 1007)  Respirations: 17 (01/13/23 1543)  Height: 5' 4" (162 6 cm) (01/09/23 0830)  Weight - Scale: 83 9 kg (185 lb) (01/09/23 0830)  SpO2: 99 % (01/13/23 1543)  Exam:   Physical Exam  Vitals reviewed  Constitutional:       Appearance: Normal appearance  HENT:      Head: Normocephalic and atraumatic  Eyes:      General: No scleral icterus  Pupils: Pupils are equal, round, and reactive to light  Cardiovascular:      Rate and Rhythm: Normal rate and regular rhythm  Pulses: Normal pulses  Heart sounds: Normal heart sounds  No murmur heard  No friction rub  No gallop  Pulmonary:      Effort: Pulmonary effort is normal  No respiratory distress  Breath sounds: Normal breath sounds  No wheezing or rales  Abdominal:      General: Abdomen is flat  Bowel sounds are normal  There is no distension  Palpations: Abdomen is soft  Tenderness: There is no abdominal tenderness  Comments: Abdominal binder off at the time of exam   Musculoskeletal:      Right lower leg: No edema  Left lower leg: No edema  Skin:     General: Skin is warm and dry  Capillary Refill: Capillary refill takes less than 2 seconds  Findings: No rash  Neurological:      General: No focal deficit present  Mental Status: He is alert and oriented to person, place, and time  Cranial Nerves: No cranial nerve deficit  Sensory: No sensory deficit  Psychiatric:         Mood and Affect: Mood normal          Behavior: Behavior normal           Discussion with Family: Updated  (daughter) via phone      Discharge instructions/Information to patient and family:   See after visit summary for information provided to patient and family  Provisions for Follow-Up Care:  See after visit summary for information related to follow-up care and any pertinent home health orders  Disposition:   Home with VNA Services (Reminder: Complete face to face encounter)    Planned Readmission: No     Discharge Statement:  I spent 45 minutes discharging the patient  This time was spent on the day of discharge  I had direct contact with the patient on the day of discharge  Greater than 50% of the total time was spent examining patient, answering all patient questions, arranging and discussing plan of care with patient as well as directly providing post-discharge instructions  Additional time then spent on discharge activities  Discharge Medications:  See after visit summary for reconciled discharge medications provided to patient and/or family        **Please Note: This note may have been constructed using a voice recognition system**

## 2023-01-14 NOTE — DISCHARGE SUMMARY
[address] [facility]  Progress Note - Saulo Hong 1955, 74 y o  male MRN: [id number]  [facility] Encounter: [id number]  Primary Care Provider: Acacia Marcum MD   Date and time admitted to hospital: 1/6/2023 10:30 AM     Adrenal insufficiency   Assessment & Plan  • Labs with endocrine outpatient  • Continue Florinef 0 2 mg daily  • On hydrocortisone 15 mg in a m  and 10 mg in evening  Endocrine adjusting to hydrocortisone to 20 mg qam and 10mg qpm with   2mg florinef daily  Appreciate their expertise     Orthostatic hypotension  Assessment & Plan  Present on arrival  Likely related to Type 1 diabetes w diabetic autonomic neuropathy and known adrenal insufficiency   • Current home regimen -Florinef 0 2 daily, hydrocortisone 15 mg in a m  and 10 mg in p m , midodrine 10 mg 3 times daily  • Given persistent and recurrent orthostatic hypotension, endocrine consulted and regimen adjusted  • Seems like despite this regimen he was having orthostatic hypotension and near syncope  • Generally stable, but did have a fall today, discussed with daughter to see if she would still prefer to take patient home or aim for rehab     Psychiatric disorder  Assessment & Plan  • Continue Risperidone and Zoloft at home dose     Anemia of chronic disease  Assessment & Plan  • Baseline seems to be between 8-10  • currently at baseline        History of stroke  Assessment & Plan  • Aspirin, statin, Eliquis     Paroxysmal atrial fibrillation   Assessment & Plan  • Hold metoprolol due to severe orthostatic hypotension    • Continue Eliquis     Insulin dependent type 1 diabetes mellitus   Assessment & Plan        Lab Results   Component Value Date     HGBA1C 7 5 (H) 07/22/2022                Recent Labs     01/12/23  1118 01/12/23  1608 01/12/23  2113 01/13/23  0731   POCGLU 137 68 258* 201*         Blood Sugar Average: Last 72 hrs:  (P) 604 [id number]      •  home regimen adjusted to Lantus 15 units at bedtime, Humalog  units 3 times daily  • Increased dose slightly given hyperglycemia  • Continue ISS  • Follow-up with endocrine OP  • Hypoglycemia protocol     * Syncope  Assessment & Plan  • Syncope/near syncope x2-3  • Also had another similar episode this morning while working with physical therapy which was likely related to orthostatic hypotension as well and his orthostatic vitals were positive for hypotension     • Most likely suspect secondary to orthostatic hypotension only  • EKG - NSR  Troponin negative  • Ct head: No acute intracranial CT abnormality  Stable chronic bilateral gangliocapsular infarcts   Chronic microangiopathy  Right maxillary sinus mucosal thickening containing hyperattenuating foci may indicate inspissated secretions versus fungal colonization  • Lost 4 lb in few days - ? If that contributing  He is scheduled to get EGD and colonoscopy end of this month  • Echo shows ef of 65% with grade 1 DD and no aortic stenosis,            Medical Problems      Resolved Problems  Date Reviewed: 1/13/2023   None         Discharging Physician / Practitioner: Willard Ortiz  PCP: Moon Denis MD  Admission Date:       Admission Orders (From admission, onward)       Ordered         01/07/23 1221   Inpatient Admission  Once             01/06/23 1337   Place in Observation  Once                         Discharge Date: 01/13/23     Consultations During Hospital Stay:  • Endocrinology  • Cardiology     Procedures Performed:   • N/A     Significant Findings / Test Results:   • CT head with "No acute intracranial CT abnormality  Stable chronic bilateral gangliocapsular infarcts   Chronic microangiopathy  Right maxillary sinus mucosal thickening containing hyperattenuating foci may indicate inspissated secretions versus fungal colonization      Test Results Pending at Discharge (will require follow up):   • N/A     Outpatient Tests Requested:  • N/A     Complications:  In-Hospital Floor on 1/13/23     Reason for Admission: Syncope     Hospital Course:   Óscar Drummond is a 74 y o  male patient who originally presented to the hospital on 1/6/2023 due to syncope and fall  He has a known history of CVA, orthostatic hypotension, diabetes, and adrenal insufficiency  He was presenting for multiple falls with hypotension at home  In the ED a CT did not show any acute new findings and labs are generally unremarkable  He takes medications that may have contributed to the fall such as metoprolol  EKG did not appear to demonstrate any acute ischemic findings  Cardiology and endocrinology were consulted  Cardiology recommended echocardiogram, felt that falls were related to orthostatic hypotension alone  They recommended discontinuation of beta-blocker      Echocardiogram obtained did not show any new structural cardiac defects  Endocrinology adjusted the patient's insulin as well as his steroid therapy  Patient was relatively stable in the hospital with his abdominal binder on so long as he was taking midodrine, Florinef, and hydrocortisone  He would continue to have episodes of orthostatic hypotension however this is his baseline, and likely is not something that could be definitively treated with medical therapy  Patient was seen by PT and they deemed him to need rehab, unfortunately there were no suitable rehab facility so ultimately the patient's daughter opted to take the patient home with a home therapy referral   On the day of discharge, the patient unfortunately had an in-hospital fall  He stated that he stood up and fell instantly  He was not wearing his abdominal binder, and he knows he should have allowed his pressures to equilibrate after positional changes  I discussed this with the patient's daughter, she states the patient knows all these lifestyle and behavioral modifications, however given his CVA history he tends to forget    Patient's daughter still opted to take the patient home on 1/13/2023        Please see above list of diagnoses and related plan for additional information       Condition at Discharge: good     Discharge Day Visit / Exam:   Subjective:  No acute distress  Vitals: Blood Pressure: 101/73 (01/13/23 1543)  Pulse: 73 (01/13/23 1543)  Temperature: (!) 97 3 °F (36 3 °C) (01/13/23 1543)  Temp Source: Temporal (01/13/23 1007)  Respirations: 17 (01/13/23 1543)  Height: 5' 4" (162 6 cm) (01/09/23 0830)  Weight - Scale: 83 9 kg (185 lb) (01/09/23 0830)  SpO2: 99 % (01/13/23 1543)  Exam:   Physical Exam  Vitals reviewed  Constitutional:       Appearance: Normal appearance  HENT:      Head: Normocephalic and atraumatic  Eyes:      General: No scleral icterus  Pupils: Pupils are equal, round, and reactive to light  Cardiovascular:      Rate and Rhythm: Normal rate and regular rhythm  Pulses: Normal pulses  Heart sounds: Normal heart sounds  No murmur heard  No friction rub  No gallop  Pulmonary:      Effort: Pulmonary effort is normal  No respiratory distress  Breath sounds: Normal breath sounds  No wheezing or rales  Abdominal:      General: Abdomen is flat  Bowel sounds are normal  There is no distension  Palpations: Abdomen is soft  Tenderness: There is no abdominal tenderness  Comments: Abdominal binder off at the time of exam   Musculoskeletal:      Right lower leg: No edema  Left lower leg: No edema  Skin:     General: Skin is warm and dry  Capillary Refill: Capillary refill takes less than 2 seconds  Findings: No rash  Neurological:      General: No focal deficit present  Mental Status: He is alert and oriented to person, place, and time  Cranial Nerves: No cranial nerve deficit  Sensory: No sensory deficit     Psychiatric:         Mood and Affect: Mood normal          Behavior: Behavior normal             Discussion with Family: Updated  (daughter) via phone      Discharge instructions/Information to patient and family:   See after visit summary for information provided to patient and family        Provisions for Follow-Up Care:  See after visit summary for information related to follow-up care and any pertinent home health orders  Disposition:   Home with VNA Services (Reminder: Complete face to face encounter)     Planned Readmission: No     Discharge Statement:  I spent 45 minutes discharging the patient  This time was spent on the day of discharge  I had direct contact with the patient on the day of discharge  Greater than 50% of the total time was spent examining patient, answering all patient questions, arranging and discussing plan of care with patient as well as directly providing post-discharge instructions    Additional time then spent on discharge activities      Discharge Medications:  See after visit summary for reconciled discharge medications provided to patient and/or family        **Please Note: This note may have been constructed using a voice recognition system**

## 2023-01-17 ENCOUNTER — APPOINTMENT (EMERGENCY)
Dept: RADIOLOGY | Facility: HOSPITAL | Age: 68
End: 2023-01-17

## 2023-01-17 ENCOUNTER — HOSPITAL ENCOUNTER (EMERGENCY)
Facility: HOSPITAL | Age: 68
Discharge: HOME | End: 2023-01-17
Attending: EMERGENCY MEDICINE

## 2023-01-17 VITALS
HEART RATE: 70 BPM | OXYGEN SATURATION: 99 % | RESPIRATION RATE: 16 BRPM | SYSTOLIC BLOOD PRESSURE: 157 MMHG | DIASTOLIC BLOOD PRESSURE: 87 MMHG | TEMPERATURE: 97.9 F

## 2023-01-17 DIAGNOSIS — Z86.39 HISTORY OF ADRENAL INSUFFICIENCY: ICD-10-CM

## 2023-01-17 DIAGNOSIS — I95.1 ORTHOSTATIC HYPOTENSION: Primary | ICD-10-CM

## 2023-01-17 LAB
ALBUMIN SERPL BCP-MCNC: 3.4 G/DL (ref 3.5–5)
ALP SERPL-CCNC: 87 U/L (ref 46–116)
ALT SERPL W P-5'-P-CCNC: 17 U/L (ref 12–78)
AMMONIA PLAS-SCNC: 13 UMOL/L (ref 11–35)
ANION GAP SERPL CALCULATED.3IONS-SCNC: 4 MMOL/L (ref 4–13)
AST SERPL W P-5'-P-CCNC: 9 U/L (ref 5–45)
ATRIAL RATE: 72 BPM
BACTERIA UR QL AUTO: ABNORMAL /HPF
BASE EX.OXY STD BLDV CALC-SCNC: 90.5 % (ref 60–80)
BASE EXCESS BLDV CALC-SCNC: 1.7 MMOL/L
BASOPHILS # BLD AUTO: 0.02 THOUSANDS/ÂΜL (ref 0–0.1)
BASOPHILS NFR BLD AUTO: 0 % (ref 0–1)
BILIRUB SERPL-MCNC: 0.37 MG/DL (ref 0.2–1)
BILIRUB UR QL STRIP: NEGATIVE
BUDDING YEAST: PRESENT
BUN SERPL-MCNC: 20 MG/DL (ref 5–25)
CALCIUM ALBUM COR SERPL-MCNC: 9.2 MG/DL (ref 8.3–10.1)
CALCIUM SERPL-MCNC: 8.7 MG/DL (ref 8.3–10.1)
CARDIAC TROPONIN I PNL SERPL HS: 10 NG/L
CHLORIDE SERPL-SCNC: 107 MMOL/L (ref 96–108)
CLARITY UR: CLEAR
CO2 SERPL-SCNC: 29 MMOL/L (ref 21–32)
COLOR UR: YELLOW
CREAT SERPL-MCNC: 1.28 MG/DL (ref 0.6–1.3)
EOSINOPHIL # BLD AUTO: 0.08 THOUSAND/ÂΜL (ref 0–0.61)
EOSINOPHIL NFR BLD AUTO: 1 % (ref 0–6)
ERYTHROCYTE [DISTWIDTH] IN BLOOD BY AUTOMATED COUNT: 15.9 % (ref 11.6–15.1)
GFR SERPL CREATININE-BSD FRML MDRD: 58 ML/MIN/1.73SQ M
GLUCOSE SERPL-MCNC: 199 MG/DL (ref 65–140)
GLUCOSE SERPL-MCNC: 225 MG/DL (ref 65–140)
GLUCOSE UR STRIP-MCNC: NEGATIVE MG/DL
HBV SURFACE AG SER QL: NORMAL
HCO3 BLDV-SCNC: 26.8 MMOL/L (ref 24–30)
HCT VFR BLD AUTO: 31.2 % (ref 36.5–49.3)
HCV AB SER QL: NORMAL
HGB BLD-MCNC: 9.7 G/DL (ref 12–17)
HGB UR QL STRIP.AUTO: NEGATIVE
HIV 1+2 AB+HIV1 P24 AG SERPL QL IA: NORMAL
HIV1 P24 AG SER QL: NORMAL
HYALINE CASTS #/AREA URNS LPF: ABNORMAL /LPF
IMM GRANULOCYTES # BLD AUTO: 0.06 THOUSAND/UL (ref 0–0.2)
IMM GRANULOCYTES NFR BLD AUTO: 1 % (ref 0–2)
KETONES UR STRIP-MCNC: NEGATIVE MG/DL
LEUKOCYTE ESTERASE UR QL STRIP: ABNORMAL
LYMPHOCYTES # BLD AUTO: 1.67 THOUSANDS/ÂΜL (ref 0.6–4.47)
LYMPHOCYTES NFR BLD AUTO: 20 % (ref 14–44)
MCH RBC QN AUTO: 28.4 PG (ref 26.8–34.3)
MCHC RBC AUTO-ENTMCNC: 31.1 G/DL (ref 31.4–37.4)
MCV RBC AUTO: 91 FL (ref 82–98)
MONOCYTES # BLD AUTO: 1.27 THOUSAND/ÂΜL (ref 0.17–1.22)
MONOCYTES NFR BLD AUTO: 15 % (ref 4–12)
NEUTROPHILS # BLD AUTO: 5.34 THOUSANDS/ÂΜL (ref 1.85–7.62)
NEUTS SEG NFR BLD AUTO: 63 % (ref 43–75)
NITRITE UR QL STRIP: NEGATIVE
NON-SQ EPI CELLS URNS QL MICRO: ABNORMAL /HPF
NRBC BLD AUTO-RTO: 0 /100 WBCS
O2 CT BLDV-SCNC: 13.3 ML/DL
P AXIS: 33 DEGREES
PCO2 BLDV: 44.3 MM HG (ref 42–50)
PH BLDV: 7.4 [PH] (ref 7.3–7.4)
PH UR STRIP.AUTO: 6 [PH]
PLATELET # BLD AUTO: 174 THOUSANDS/UL (ref 149–390)
PMV BLD AUTO: 11.4 FL (ref 8.9–12.7)
PO2 BLDV: 69.7 MM HG (ref 35–45)
POTASSIUM SERPL-SCNC: 4.2 MMOL/L (ref 3.5–5.3)
PR INTERVAL: 164 MS
PROT SERPL-MCNC: 6.6 G/DL (ref 6.4–8.4)
PROT UR STRIP-MCNC: NEGATIVE MG/DL
QRS AXIS: 42 DEGREES
QRSD INTERVAL: 70 MS
QT INTERVAL: 386 MS
QTC INTERVAL: 422 MS
RBC # BLD AUTO: 3.42 MILLION/UL (ref 3.88–5.62)
RBC #/AREA URNS AUTO: ABNORMAL /HPF
SODIUM SERPL-SCNC: 140 MMOL/L (ref 135–147)
SP GR UR STRIP.AUTO: 1.01 (ref 1–1.03)
T WAVE AXIS: -13 DEGREES
UROBILINOGEN UR STRIP-ACNC: 2 MG/DL
VENTRICULAR RATE: 72 BPM
WBC # BLD AUTO: 8.44 THOUSAND/UL (ref 4.31–10.16)
WBC #/AREA URNS AUTO: ABNORMAL /HPF

## 2023-01-17 RX ORDER — SODIUM CHLORIDE 9 MG/ML
3 INJECTION INTRAVENOUS
Status: DISCONTINUED | OUTPATIENT
Start: 2023-01-17 | End: 2023-01-17 | Stop reason: HOSPADM

## 2023-01-17 NOTE — DISCHARGE INSTRUCTIONS
Please continue taking your medications as directed  Please schedule an appointment with your [facility] doctor as soon as possible  Return if [facility] falls or shows signs of confusion or nausea vomiting or fevers

## 2023-01-17 NOTE — ED NOTES
Pt provided with urinal and made aware that a sample is needed  Pt unable to provide one at this time        Kimberly Najera  01/17/23 3857

## 2023-01-17 NOTE — ED NOTES
RN attempted IV x3 on pt with no success, resident made aware and will attempt [id number]  [facility], RN  01/17/23 2428

## 2023-01-17 NOTE — UTILIZATION REVIEW
NOTIFICATION OF ADMISSION DISCHARGE   This is a Notification of Discharge from [facility]  Please be advised that this patient has been discharge from our facility  Below you will find the admission and discharge date and time including the patient’s disposition  UTILIZATION REVIEW CONTACT:  Prasad Dai  Utilization   Network Utilization Review Department  Phone: [phone] x carefully listen to the prompts  All voicemails are confidential   Email: [email or web address]     ADMISSION INFORMATION  PRESENTATION DATE: 1/6/2023 10:30 AM  OBERVATION ADMISSION DATE:   INPATIENT ADMISSION DATE: 1/7/23 12:21 PM   DISCHARGE DATE: 1/13/2023  5:14 PM   DISPOSITION:Home with Home Health Care    IMPORTANT INFORMATION:  Send all requests for admission clinical reviews, approved or denied determinations and any other requests to dedicated fax number below belonging to the campus where the patient is receiving treatment   List of dedicated fax numbers:  [address] DENIALS (Administrative/Medical Necessity) [phone]   [address] (Maternity/NICU/Pediatrics) [phone]   [facility] [phone]   [facility] [phone]   [facility] [phone]   220 [facility] [phone]   90 [facility] [phone]   [address] [place] [address] 119 [phone]   [facility]  [phone] 405[facility] [phone]   [address] [place] [address] [facility] [phone]

## 2023-01-17 NOTE — ED NOTES
X ray at bedside     [facility], [facility]  01/17/23 [phone]

## 2023-01-19 ENCOUNTER — CONSULT (OUTPATIENT)
Dept: HEMATOLOGY ONCOLOGY | Facility: CLINIC | Age: 68
End: 2023-01-19

## 2023-01-19 VITALS
TEMPERATURE: 98.1 F | BODY MASS INDEX: 31.76 KG/M2 | SYSTOLIC BLOOD PRESSURE: 118 MMHG | DIASTOLIC BLOOD PRESSURE: 60 MMHG | OXYGEN SATURATION: 100 % | RESPIRATION RATE: 16 BRPM | HEIGHT: 64 IN | HEART RATE: 73 BPM

## 2023-01-19 DIAGNOSIS — R76.8 ELEVATED SERUM IMMUNOGLOBULIN FREE LIGHT CHAINS: ICD-10-CM

## 2023-01-19 DIAGNOSIS — R79.0 LOW FERRITIN: ICD-10-CM

## 2023-01-19 DIAGNOSIS — D63.8 ANEMIA OF CHRONIC DISEASE: Primary | ICD-10-CM

## 2023-01-19 DIAGNOSIS — E46 PROTEIN-CALORIE MALNUTRITION, UNSPECIFIED SEVERITY (HCC): ICD-10-CM

## 2023-01-19 RX ORDER — INSULIN GLARGINE 100 [IU]/ML
INJECTION, SOLUTION SUBCUTANEOUS
COMMUNITY
Start: 2023-01-15

## 2023-01-19 NOTE — PROGRESS NOTES
[address] [facility] [address] [phone]  Hematology Ambulatory Consult  Servando Mills, 1955, [id number]  1/19/2023      Assessment and Plan   1  Elevated serum immunoglobulin free light chains  2  Anemia of chronic disease  3  Low ferritin  4  Protein-calorie malnutrition, unspecified severity ([facility] )   Patient is a 77-year-old male with a history of adrenal insufficiency, hypertension, type 2 diabetes, who is recently been hospitalized twice over the past several months for headaches and poor appetite  During hospitalization in December he was found to have slightly elevated kappa lambda ratio at 2 03, SPEP showed no monoclonal bands  Patient does have a history of anemia he was referred to our office for further evaluation  Patient was rehospitalized in January 2023 with adrenal insufficiency, syncope and fall, he has a history of a CVA and multiple falls at home  CBC shows normal white blood cell count, hemoglobin 9 4 to 10 5 g/dL, normal MCV, normal platelets, relative monocytes 15%, absolute monocytes 1 27 otherwise normal differential   On 12/8/2022 immunoglobulin light chains shows an elevated kappa light chain at 48 1, kappa lambda ratio 2 03  No quantitative immunoglobulins was obtained  SPEP showed no monoclonal bands  Patient does have a history of a low ferritin noted in 2019 less than 4 no iron saturation identified  We discussed etiology of anemia could be secondary to iron deficiency versus chronic kidney disease  We will request the following labs for further evaluation  Patient and his daughter prefer to have a virtual visit as it is difficult for patient to get out of his home  He has a history of adrenal insufficiency and frequent falls  We will see him virtually in 3 weeks, patient and his daughter verbalized understanding and are in agreement with plan      - Ambulatory Referral to Hematology / Oncology  - Iron Panel (Includes Ferritin, Iron Sat%, Iron, and TIBC); Future  - IgG, IgA, IgM; Future  - Immunoglobulin free LT chains blood; Future  - Erythropoietin; Future    Barrier(s) to care: None  The patient is  able to self care  [facility] [address] [place] [address]    Subjective     Chief Complaint   Patient presents with   • Consult     Referring provider    MD Mohsen Vargas [phone] [facility],  [address]    History of present illness:  Patient is a 24-year-old male with a history of adrenal insufficiency, hypertension, type 2 diabetes, who is recently been hospitalized twice over the past several months for headaches and poor appetite  During hospitalization in December he was found to have slightly elevated kappa lambda ratio at 2 03, SPEP showed no monoclonal bands  Patient does have a history of anemia he was referred to our office for further evaluation  01/19/23: Clinically stable    Review of Systems   Constitutional: Positive for fatigue  Negative for activity change, appetite change, fever and unexpected weight change  Respiratory: Negative for cough and shortness of breath  Cardiovascular: Negative for chest pain and leg swelling  Gastrointestinal: Negative for abdominal pain, constipation, diarrhea and nausea  Endocrine: Negative for cold intolerance and heat intolerance  Musculoskeletal: Positive for gait problem  Negative for arthralgias and myalgias  Skin: Negative  Neurological: Positive for weakness  Negative for dizziness and headaches  Hematological: Negative for adenopathy  Does not bruise/bleed easily  Past Medical History:   Diagnosis Date   • Diabetes mellitus ([facility] )    • Obesity, morbid ([facility] ) 11/14/2022     No past surgical history on file  No family history on file    Social History     Socioeconomic History   • Marital status: Single     Spouse name: None   • Number of children: None   • Years of education: None   • Highest education level: None   Occupational History   • None   Tobacco Use   • Smoking status: Former     Packs/day: 0 25     Years: 30 00     Pack years: 7 50     Types: Cigarettes   • Smokeless tobacco: Never   Vaping Use   • Vaping Use: Never used   Substance and Sexual Activity   • Alcohol use: Not Currently     Alcohol/week: 5 0 standard drinks     Types: 5 Cans of beer per week     Comment: Quit in august   • Drug use: Never   • Sexual activity: None   Other Topics Concern   • None   Social History Narrative   • None     Social Determinants of Health     Financial Resource Strain: Not on file   Food Insecurity: No Food Insecurity   • Worried About Running Out of Food in the Last Year: Never true   • Ran Out of Food in the Last Year: Never true   Transportation Needs: Not on file   Physical Activity: Not on file   Stress: Not on file   Social Connections: Not on file   Intimate Partner Violence: Not on file   Housing Stability: Low Risk    • Unable to Pay for Housing in the Last Year: No   • Number of Places Lived in the Last Year: 1   • Unstable Housing in the Last Year: No         Current Outpatient Medications:   •  apixaban (ELIQUIS) 5 mg, Take 1 tablet (5 mg total) by mouth 2 (two) times a day, Disp: 60 tablet, Rfl: 2  •  aspirin 81 mg chewable tablet, Chew 81 mg daily, Disp: , Rfl:   •  atorvastatin (LIPITOR) 10 mg tablet, Take 1 tablet by mouth daily, Disp: , Rfl:   •  calcitriol (ROCALTROL) 0 25 mcg capsule, Take 1 capsule (0 25 mcg total) by mouth daily, Disp: 30 capsule, Rfl: 1  •  cyanocobalamin (VITAMIN B-12) 100 mcg tablet, Take by mouth daily, Disp: , Rfl:   •  fludrocortisone (FLORINEF) 0 1 mg tablet, Take 2 tablets (0 2 mg total) by mouth daily, Disp: 60 tablet, Rfl: 5  •  hydrocortisone (CORTEF) 5 mg tablet, Take 15mg(3 tabs) on waking up and  10mg(2 tabs) in evening, Disp: 180 tablet, Rfl: 1  •  insulin lispro (HumaLOG KwikPen) 100 units/mL injection pen, Inject 5 Units under the skin 3 (three) times a day with meals 7 with breakfast, 6 units with lunch and dinner  Plus sliding scale, Disp: 15 mL, Rfl: 0  •  Insulin Pen Needle (BD Pen Needle Blessing 2nd Gen) 32G X 4 MM MISC, For use with insulin pen  Pharmacy may dispense brand covered by insurance , Disp: 100 each, Rfl: 0  •  Insulin Pen Needle (BD Pen Needle Blessing 2nd Gen) 32G X 4 MM MISC, For use with insulin pen  Pharmacy may dispense brand covered by insurance , Disp: 100 each, Rfl: 2  •  Lantus SoloStar 100 units/mL SOPN, , Disp: , Rfl:   •  midodrine (PROAMATINE) 10 MG tablet, Take 1 tablet (10 mg total) by mouth 3 (three) times a day before meals, Disp: 90 tablet, Rfl: 0  •  potassium chloride (Klor-Con) 10 mEq tablet, Take 20 mEq by mouth 2 (two) times a day, Disp: , Rfl:   •  risperiDONE (RisperDAL) 1 mg tablet, Take 1 tablet by mouth 2 (two) times a day, Disp: , Rfl:   •  sertraline (ZOLOFT) 100 mg tablet, Take 100 mg by mouth daily, Disp: , Rfl:   •  insulin glargine (Toujeo SoloStar) 300 units/mL CONCENTRATED U-300 injection pen (1-unit dial), Inject 18 Units under the skin daily at bedtime (Patient not taking: Reported on 1/19/2023), Disp: 15 mL, Rfl: 0  Allergies   Allergen Reactions   • Imipramine Other (See Comments)   • Lisinopril Other (See Comments)   • Paroxetine Other (See Comments)   • Penicillins Hives   • Rosiglitazone Other (See Comments)   • Troglitazone Other (See Comments)       Objective   /60 (BP Location: Left arm, Patient Position: Sitting, Cuff Size: Adult)   Pulse 73   Temp 98 1 °F (36 7 °C) (Temporal)   Resp 16   Ht 5' 4" (1 626 m)   SpO2 100%   BMI 31 76 kg/m²   Physical Exam  Vitals reviewed  Constitutional:       Appearance: Normal appearance  He is well-developed  Comments: Seen sitting in wheelchair   HENT:      Head: Normocephalic and atraumatic  Eyes:      Pupils: Pupils are equal, round, and reactive to light     Pulmonary:      Effort: Pulmonary effort is normal  No respiratory distress  Musculoskeletal:         General: Normal range of motion  Cervical back: Normal range of motion  Lymphadenopathy:      Cervical: No cervical adenopathy  Skin:     General: Skin is dry  Neurological:      Mental Status: He is alert and oriented to person, place, and time  Psychiatric:         Behavior: Behavior normal          Result Review  Labs:    Imaging:     Please note: This report has been generated by a voice recognition software system  Therefore there may be syntax, spelling, and/or grammatical errors  Please call if you have any questions

## 2023-01-22 NOTE — ED ATTENDING ATTESTATION
1/17/2023  IJuan MD, saw and evaluated the patient  I have discussed the patient with the resident/non-physician practitioner and agree with the resident's/non-physician practitioner's findings, Plan of Care, and MDM as documented in the resident's/non-physician practitioner's note, except where noted  All available labs and Radiology studies were reviewed  I was present for key portions of any procedure(s) performed by the resident/non-physician practitioner and I was immediately available to provide assistance  At this point I agree with the current assessment done in the Emergency Department  I have conducted an independent evaluation of this patient a history and physical is as follows: This is a 74 y o  old male who presents to the ED for evaluation of fall, slow to respond  Hx prior strokes  VS and nursing notes reviewed  General: Appears in NAD, awake, alert, speaking normally in full sentences  Well-nourished, well-developed  Appears stated age  Head: Normocephalic, atraumatic  Eyes: EOMI  Vision grossly normal  No subconjunctival hemorrhages or occular discharge noted  Symmetrical lids  ENT: Atraumatic external nose and ears  No stridor  Normal phonation  No drooling  Normal swallowing  Neck: No JVD  FROM  No goiter  CV: No pallor  Normal rate  Lungs: No tachypnea  No respiratory distress  MSK: Moving all extremities equally, no peripheral edema  Skin: Dry, intact  No cyanosis  Neuro: Awake, alert but slow to respond, GCS14  CN II-XII grossly intact  Psychiatric/Behavioral: Appropriate mood and affect  A/P: This is a 74 y o  male who presents to the ED for evaluation of fall  Known hx orthostatis  Labs, CTh  Anticipate admission, discuss with family if thi sis his baseline  Trial of ambulateion        ED Course         Critical Care Time  Procedures

## 2023-01-23 ENCOUNTER — TELEPHONE (OUTPATIENT)
Dept: NEPHROLOGY | Facility: CLINIC | Age: 68
End: 2023-01-23

## 2023-01-23 NOTE — TELEPHONE ENCOUNTER
Appointment Confirmation   Person confirmed appointment with  If not patient, name of the person Child - Kristen   Date and time of appointment 1/24    Patient acknowledged and will be at appointment? yes    Did you advise the patient that they will need a urine sample if they are a new patient?  N/A    Did you advise the patient to bring their current medications for verification? (including any OTC) Yes    Additional Information

## 2023-01-24 ENCOUNTER — TELEPHONE (OUTPATIENT)
Dept: ENDOCRINOLOGY | Facility: CLINIC | Age: 68
End: 2023-01-24

## 2023-01-24 ENCOUNTER — APPOINTMENT (OUTPATIENT)
Dept: LAB | Facility: CLINIC | Age: 68
End: 2023-01-24

## 2023-01-24 ENCOUNTER — OFFICE VISIT (OUTPATIENT)
Dept: NEPHROLOGY | Facility: CLINIC | Age: 68
End: 2023-01-24

## 2023-01-24 VITALS
BODY MASS INDEX: 32.27 KG/M2 | HEIGHT: 64 IN | SYSTOLIC BLOOD PRESSURE: 118 MMHG | WEIGHT: 189 LBS | DIASTOLIC BLOOD PRESSURE: 58 MMHG

## 2023-01-24 DIAGNOSIS — R76.8 ELEVATED SERUM IMMUNOGLOBULIN FREE LIGHT CHAINS: ICD-10-CM

## 2023-01-24 DIAGNOSIS — I95.9 HYPOTENSION: Primary | ICD-10-CM

## 2023-01-24 DIAGNOSIS — E10.65 TYPE 1 DIABETES MELLITUS WITH HYPERGLYCEMIA (HCC): ICD-10-CM

## 2023-01-24 DIAGNOSIS — D63.8 ANEMIA OF CHRONIC DISEASE: ICD-10-CM

## 2023-01-24 DIAGNOSIS — R79.0 LOW FERRITIN: ICD-10-CM

## 2023-01-24 LAB
EST. AVERAGE GLUCOSE BLD GHB EST-MCNC: 166 MG/DL
FERRITIN SERPL-MCNC: 25 NG/ML (ref 8–388)
HBA1C MFR BLD: 7.4 %
IGA SERPL-MCNC: 320 MG/DL (ref 70–400)
IGG SERPL-MCNC: 886 MG/DL (ref 700–1600)
IGM SERPL-MCNC: 124 MG/DL (ref 40–230)
IRON SATN MFR SERPL: 12 % (ref 20–50)
IRON SERPL-MCNC: 35 UG/DL (ref 65–175)
TIBC SERPL-MCNC: 290 UG/DL (ref 250–450)

## 2023-01-24 NOTE — PROGRESS NOTES
[address] 74 y o  male MRN: [id number]  DATE: 1/24/2023  Reason for visit:   Chief Complaint   Patient presents with   • Follow-up     Hypotension     ASSESSMENT and PLAN:  Orthostatic hypotension  -Suspect secondary to autonomic dysfunction in the setting of type 1 diabetes versus other etiologies  -Patient had recent hospitalization due to same   -Currently remains on midodrine 10 mg p o  3 times daily, Florinef 0 2 mg daily, hydrocortisone 15 mg in a m /10 mg in p m   -Recommended to have salt liberal diet  -Use compression stockings, abdominal binders daily  -Discussed slowly standing up to avoid symptomatic episodes   -His home BP generally low over 80s/50s in a m  but improves during the day 100s to 110s/60s in p m   -He does feel lightheaded in the morning when his blood pressure is lower  -Advised patient and his daughter to discuss with endocrine to see if hydrocortisone does need to be adjusted  -May consider salt tablet if continues to have symptomatic hypotension episodes   -Continue to monitor BP closely at home  -BP acceptable in the office today  Renal function overall stable with baseline serum creatinine 0 9-1 2  -he may have mild CKD component given long-term type 1 diabetes  -Last creatinine 1 2 overall stable  -UA in January 2023 shows no significant hematuria, no proteinuria   -Check UACR before next visit    Elevated free light chain ratio in the setting of anemia  -FLC ratio 2 0, SPEP, UPEP negative  -Was evaluated by hematology, currently undergoing further work-up    Diagnoses and all orders for this visit:    Elevated serum immunoglobulin free light chains  -     Microalbumin / creatinine urine ratio; Future  -     Protein / creatinine ratio, urine; Future    Hypotension  -     Ambulatory referral to Nephrology  -     Basic metabolic panel; Future  -     Microalbumin / creatinine urine ratio;  Future  -     Protein / creatinine ratio, urine; Future  -     Phosphorus; Future  -     PTH, intact; Future          SUBJECTIVE / HPI:  Patient is 14-year-old male with significant medical issues of type 1 diabetes diagnosed since age of 42, A  fib, adrenal insufficiency, CVA, orthostatic hypotension, comes for regular follow-up of hypotension  Patient was recently hospitalized due to syncope and fall  He has been having ongoing orthostatic hypotension issues  Overall feels better since discharge from the hospital   His BP still remains lower in the morning with some lightheadedness although BP improves during the day  Patient currently denies any nausea, vomiting, chest pain or shortness of breath  Denies any urinary complaint  He claims to be compliant with using compression stockings  REVIEW OF SYSTEMS:  More than 10 point review of systems were obtained and discussed in length with the patient  Complete review of systems were negative / unremarkable except mentioned above  PHYSICAL EXAM:  Vitals:    01/24/23 1056   BP: 118/58   BP Location: Left arm   Patient Position: Sitting   Cuff Size: Standard   Weight: 85 7 kg (189 lb)   Height: 5' 4" (1 626 m)     Body mass index is 32 44 kg/m²  Physical Exam  Vitals reviewed  Constitutional:       Appearance: He is well-developed  HENT:      Head: Normocephalic and atraumatic  Right Ear: External ear normal       Left Ear: External ear normal    Eyes:      General: No scleral icterus  Conjunctiva/sclera: Conjunctivae normal    Cardiovascular:      Comments: S1, S2 present  Pulmonary:      Effort: Pulmonary effort is normal  No respiratory distress  Breath sounds: Normal breath sounds  No wheezing or rales  Abdominal:      General: Bowel sounds are normal  There is no distension  Palpations: Abdomen is soft  Tenderness: There is no abdominal tenderness  Musculoskeletal:         General: No deformity  Lymphadenopathy:      Cervical: No cervical adenopathy     Skin: Findings: No rash  Neurological:      Mental Status: He is alert and oriented to person, place, and time  Psychiatric:         Behavior: Behavior normal          PAST MEDICAL HISTORY:  Past Medical History:   Diagnosis Date   • [facility]    • Obesity, morbid ([facility] ) 11/14/2022       PAST SURGICAL HISTORY:  History reviewed  No pertinent surgical history  SOCIAL HISTORY:  Social History     Substance and Sexual Activity   Alcohol Use Not Currently   • Alcohol/week: 5 0 standard drinks   • Types: 5 Cans of beer per week    Comment: Quit in august     Social History     Substance and Sexual Activity   Drug Use Never     Social History     Tobacco Use   Smoking Status Former   • Packs/day: 0 25   • Years: 30 00   • Pack years: 7 50   • Types: Cigarettes   Smokeless Tobacco Never       FAMILY HISTORY:  History reviewed  No pertinent family history  MEDICATIONS:    Current Outpatient Medications:   •  apixaban (ELIQUIS) 5 mg, Take 1 tablet (5 mg total) by mouth 2 (two) times a day, Disp: 60 tablet, Rfl: 2  •  aspirin 81 mg chewable tablet, Chew 81 mg daily, Disp: , Rfl:   •  atorvastatin (LIPITOR) 10 mg tablet, Take 1 tablet by mouth daily, Disp: , Rfl:   •  calcitriol (ROCALTROL) 0 25 mcg capsule, Take 1 capsule (0 25 mcg total) by mouth daily, Disp: 30 capsule, Rfl: 1  •  cyanocobalamin (VITAMIN B-12) 100 mcg tablet, Take by mouth daily, Disp: , Rfl:   •  fludrocortisone (FLORINEF) 0 1 mg tablet, Take 2 tablets (0 2 mg total) by mouth daily, Disp: 60 tablet, Rfl: 5  •  hydrocortisone (CORTEF) 5 mg tablet, Take 15mg(3 tabs) on waking up and  10mg(2 tabs) in evening, Disp: 180 tablet, Rfl: 1  •  insulin lispro (HumaLOG KwikPen) 100 units/mL injection pen, Inject 5 Units under the skin 3 (three) times a day with meals 7 with breakfast, 6 units with lunch and dinner  Plus sliding scale, Disp: 15 mL, Rfl: 0  •  Insulin Pen Needle (BD Pen Needle Blessing 2nd Gen) 32G X 4 MM MISC, For use with insulin pen  Pharmacy may dispense brand covered by insurance , Disp: 100 each, Rfl: 0  •  Insulin Pen Needle (BD Pen Needle Blessing 2nd Gen) 32G X 4 MM MISC, For use with insulin pen  Pharmacy may dispense brand covered by insurance , Disp: 100 each, Rfl: 2  •  Lantus SoloStar 100 units/mL SOPN, , Disp: , Rfl:   •  midodrine (PROAMATINE) 10 MG tablet, Take 1 tablet (10 mg total) by mouth 3 (three) times a day before meals, Disp: 90 tablet, Rfl: 0  •  potassium chloride (Klor-Con) 10 mEq tablet, Take 20 mEq by mouth 2 (two) times a day, Disp: , Rfl:   •  risperiDONE (RisperDAL) 1 mg tablet, Take 1 tablet by mouth 2 (two) times a day, Disp: , Rfl:   •  sertraline (ZOLOFT) 100 mg tablet, Take 100 mg by mouth daily, Disp: , Rfl:   •  insulin glargine (Toujeo SoloStar) 300 units/mL CONCENTRATED U-300 injection pen (1-unit dial), Inject 18 Units under the skin daily at bedtime (Patient not taking: Reported on 1/19/2023), Disp: 15 mL, Rfl: 0    Lab Results:   Results for orders placed or performed in visit on 01/24/23   Hemoglobin A1C   Result Value Ref Range    Hemoglobin A1C 7 4 (H) Normal 3 8-5 6%; PreDiabetic 5 7-6 4%;  Diabetic >=6 5%; Glycemic control for adults with diabetes <7 0% %     mg/dl

## 2023-01-24 NOTE — TELEPHONE ENCOUNTER
Pt's daughter left v/m that it has been high at night and throughout the day and now it's reading high on dexcom  He did the finger stick and it was 515  Can she give extra insulin? Please call

## 2023-01-25 DIAGNOSIS — E10.9 INSULIN DEPENDENT TYPE 1 DIABETES MELLITUS (HCC): ICD-10-CM

## 2023-01-25 LAB
EPO SERPL-ACNC: 40.6 MIU/ML (ref 2.6–18.5)
KAPPA LC FREE SER-MCNC: 72.2 MG/L (ref 3.3–19.4)
KAPPA LC FREE/LAMBDA FREE SER: 2.16 {RATIO} (ref 0.26–1.65)
LAMBDA LC FREE SERPL-MCNC: 33.4 MG/L (ref 5.7–26.3)

## 2023-01-25 RX ORDER — INSULIN LISPRO 100 [IU]/ML
INJECTION, SOLUTION INTRAVENOUS; SUBCUTANEOUS
Qty: 15 ML | Refills: 0 | Status: SHIPPED | OUTPATIENT
Start: 2023-01-25

## 2023-01-25 RX ORDER — INSULIN LISPRO 100 [IU]/ML
5 INJECTION, SOLUTION INTRAVENOUS; SUBCUTANEOUS
Qty: 15 ML | Refills: 0 | Status: SHIPPED | OUTPATIENT
Start: 2023-01-25 | End: 2023-01-25

## 2023-01-25 NOTE — TELEPHONE ENCOUNTER
Pt's daughter called in today regarding no call back from [facility]      Also, requesting a refill of the Humalog

## 2023-01-26 ENCOUNTER — TELEPHONE (OUTPATIENT)
Dept: CARDIOLOGY CLINIC | Facility: CLINIC | Age: 68
End: 2023-01-26

## 2023-01-26 DIAGNOSIS — I95.1 ORTHOSTATIC HYPOTENSION: Primary | ICD-10-CM

## 2023-01-26 NOTE — ED PROVIDER NOTES
History  Chief Complaint   Patient presents with   • Fall     Per EMS - pt comes from home, home RN noticed 90/40s BP, multiple unwitnessed falls over past few days, +thinners     HPI  29-year-old male with past medical history of adrenal insufficiency and history of multiple strokes currently on blood thinning medicine presents for evaluation of hypotension at home  Patient has home health nurses come to evaluate him periodically and he was found to be hypotensive at home  This is not a new finding and is on midodrine at home and also stress dose steroids  Patient is at his baseline  Family does endorse that the patient does have really bad orthostatic hypotension and will tend to fall periodically  Denies any chest pain, fevers, chills, nausea, vomiting, lateralizing weakness  Prior to Admission Medications   Prescriptions Last Dose Informant Patient Reported? Taking? Insulin Pen Needle (BD Pen Needle Blessing 2nd Gen) 32G X 4 MM MISC   No No   Sig: For use with insulin pen  Pharmacy may dispense brand covered by insurance  Insulin Pen Needle (BD Pen Needle Blessing 2nd Gen) 32G X 4 MM MISC   No No   Sig: For use with insulin pen  Pharmacy may dispense brand covered by insurance     apixaban (ELIQUIS) 5 mg   No No   Sig: Take 1 tablet (5 mg total) by mouth 2 (two) times a day   aspirin 81 mg chewable tablet   Yes No   Sig: Chew 81 mg daily   atorvastatin (LIPITOR) 10 mg tablet   Yes No   Sig: Take 1 tablet by mouth daily   calcitriol (ROCALTROL) 0 25 mcg capsule   No No   Sig: Take 1 capsule (0 25 mcg total) by mouth daily   cyanocobalamin (VITAMIN B-12) 100 mcg tablet   Yes No   Sig: Take by mouth daily   fludrocortisone (FLORINEF) 0 1 mg tablet   No No   Sig: Take 2 tablets (0 2 mg total) by mouth daily   hydrocortisone (CORTEF) 5 mg tablet   No No   Sig: Take 15mg(3 tabs) on waking up and  10mg(2 tabs) in evening   insulin glargine (Toujeo SoloStar) 300 units/mL CONCENTRATED U-300 injection pen (1-unit dial) No No   Sig: Inject 18 Units under the skin daily at bedtime   Patient not taking: Reported on 1/19/2023   midodrine (PROAMATINE) 10 MG tablet   No No   Sig: Take 1 tablet (10 mg total) by mouth 3 (three) times a day before meals   potassium chloride (Klor-Con) 10 mEq tablet   Yes No   Sig: Take 20 mEq by mouth 2 (two) times a day   risperiDONE (RisperDAL) 1 mg tablet   Yes No   Sig: Take 1 tablet by mouth 2 (two) times a day   sertraline (ZOLOFT) 100 mg tablet   Yes No   Sig: Take 100 mg by mouth daily      Facility-Administered Medications: None       Past Medical History:   Diagnosis Date   • Diabetes mellitus ([facility] )    • [facility]/14/[facility]       No past surgical history on file  No family history on file  I have reviewed and agree with the history as documented  E-Cigarette/Vaping   • E-Cigarette Use Never User      E-Cigarette/Vaping Substances     Social History     Tobacco Use   • Smoking status: Former     Packs/day: 0 25     Years: 30 00     Pack years: 7 50     Types: Cigarettes   • Smokeless tobacco: Never   Vaping Use   • Vaping Use: Never used   Substance Use Topics   • Alcohol use: Not Currently     Alcohol/week: 5 0 standard drinks     Types: 5 Cans of beer per week     Comment: Quit in august   • Drug use: Never        Review of Systems   Constitutional: Negative for chills and fever  HENT: Negative for congestion, ear pain, rhinorrhea and sore throat  Eyes: Negative for pain  Respiratory: Negative for apnea, cough, choking, chest tightness, shortness of breath, wheezing and stridor  Cardiovascular: Negative for chest pain, palpitations and leg swelling  Gastrointestinal: Negative for abdominal pain, constipation, diarrhea, nausea and vomiting  Genitourinary: Negative for hematuria  Musculoskeletal: Negative for arthralgias and back pain  Skin: Negative for rash and wound  Neurological: Negative for dizziness     Psychiatric/Behavioral: Negative for agitation and hallucinations  All other systems reviewed and are negative  Physical Exam  ED Triage Vitals [01/17/23 1541]   Temperature Pulse Respirations Blood Pressure SpO2   99 1 °F (37 3 °C) 73 18 117/69 100 %      Temp Source Heart Rate Source Patient Position - Orthostatic VS BP Location FiO2 (%)   Oral Monitor Lying Left arm --      Pain Score       --             Orthostatic Vital Signs  Vitals:    01/17/23 1541 01/17/23 1823   BP: 117/69 157/87   Pulse: 73 70   Patient Position - Orthostatic VS: Lying Lying       Physical Exam  Vitals reviewed  Constitutional:       General: He is not in acute distress  Appearance: He is well-developed  HENT:      Head: Normocephalic and atraumatic  Right Ear: External ear normal       Left Ear: External ear normal       Nose: Nose normal  No congestion or rhinorrhea  Mouth/Throat:      Mouth: Mucous membranes are moist       Pharynx: No oropharyngeal exudate or posterior oropharyngeal erythema  Eyes:      General:         Right eye: No discharge  Left eye: No discharge  Extraocular Movements: Extraocular movements intact  Conjunctiva/sclera: Conjunctivae normal       Pupils: Pupils are equal, round, and reactive to light  Cardiovascular:      Rate and Rhythm: Normal rate and regular rhythm  Pulses: Normal pulses  Heart sounds: Normal heart sounds  Pulmonary:      Effort: Pulmonary effort is normal  No respiratory distress  Breath sounds: Normal breath sounds  No wheezing or rales  Abdominal:      Palpations: Abdomen is soft  Tenderness: There is no abdominal tenderness  Musculoskeletal:         General: No tenderness  Normal range of motion  Cervical back: Normal range of motion and neck supple  No rigidity  Skin:     General: Skin is warm  Findings: No erythema or rash  Neurological:      General: No focal deficit present  Mental Status: He is alert  Mental status is at baseline  Cranial Nerves: No cranial nerve deficit  Sensory: No sensory deficit  Motor: No weakness  Coordination: Coordination normal    Psychiatric:         Mood and Affect: Mood normal          ED Medications  Medications - No data to display    Diagnostic Studies  Results Reviewed     Procedure Component Value Units Date/Time    Hepatitis B surface antigen [[id number]]  (Normal) Collected: 01/17/23 1754    Lab Status: Final result Specimen: Blood from Arm, Right Updated: 01/17/23 1920     Hepatitis B Surface Ag Non-reactive    Hepatitis C antibody [[id number]]  (Normal) Collected: 01/17/23 1754    Lab Status: Final result Specimen: Blood from Arm, Right Updated: 01/17/23 1920     Hepatitis C Ab Non-reactive    Rapid HIV 1/2 AB-AG Combo for 15 yr old and above [[id number]]  (Normal) Collected: 01/17/23 1754    Lab Status: Final result Specimen: Blood from Arm, Right Updated: 01/17/23 1920     Rapid HIV 1 AND 2 Non-Reactive     HIV-1 P24 Ag Screen Non-Reactive    Narrative:      Negative for HIV-1 p24 Antigen  Negative for HIV-1 and/or HIV-2 Antibody      Blood gas, venous [[id number]]  (Abnormal) Collected: 01/17/23 1754    Lab Status: Final result Specimen: Blood from Arm, Right Updated: 01/17/23 1815     pH, Ricardo 7 399     pCO2, Ricardo 44 3 mm Hg      pO2, Ricardo 69 7 mm Hg      HCO3, Ricardo 26 8 mmol/L      Base Excess, Ricardo 1 7 mmol/L      O2 Content, Ricardo 13 3 ml/dL      O2 HGB, VENOUS 90 5 %     HS Troponin 0hr (reflex protocol) [[id number]]  (Normal) Collected: 01/17/23 1721    Lab Status: Final result Specimen: Blood from Arm, Right Updated: 01/17/23 1815     hs TnI 0hr 10 ng/L     Ammonia [[id number]]  (Normal) Collected: 01/17/23 1721    Lab Status: Final result Specimen: Blood from Arm, Right Updated: 01/17/23 1806     Ammonia 13 umol/L     Comprehensive metabolic panel [[id number]]  (Abnormal) Collected: 01/17/23 1721    Lab Status: Final result Specimen: Blood from Arm, Right Updated: 01/17/23 1805     Sodium 140 mmol/L      Potassium 4 2 mmol/L      Chloride 107 mmol/L      CO2 29 mmol/L      ANION GAP 4 mmol/L      BUN 20 mg/dL      Creatinine 1 28 mg/dL      Glucose 199 mg/dL      Calcium 8 7 mg/dL      Corrected Calcium 9 2 mg/dL      AST 9 U/L      ALT 17 U/L      Alkaline Phosphatase 87 U/L      Total Protein 6 6 g/dL      Albumin 3 4 g/dL      Total Bilirubin 0 37 mg/dL      eGFR 58 ml/min/1 73sq m     Narrative:      [place] guidelines for Chronic Kidney Disease (CKD):   •  Stage 1 with normal or high GFR (GFR > 90 mL/min/1 73 square meters)  •  Stage 2 Mild CKD (GFR = 60-89 mL/min/1 73 square meters)  •  Stage 3A Moderate CKD (GFR = 45-59 mL/min/1 73 square meters)  •  Stage 3B Moderate CKD (GFR = 30-44 mL/min/1 73 square meters)  •  Stage 4 Severe CKD (GFR = 15-29 mL/min/1 73 square meters)  •  Stage 5 End Stage CKD (GFR <15 mL/min/1 73 square meters)  Note: GFR calculation is accurate only with a steady state creatinine    CBC and differential [[id number]]  (Abnormal) Collected: 01/17/23 1721    Lab Status: Final result Specimen: Blood from Arm, Right Updated: 01/17/23 1740     WBC 8 44 Thousand/uL      RBC 3 42 Million/uL      Hemoglobin 9 7 g/dL      Hematocrit 31 2 %      MCV 91 fL      MCH 28 4 pg      MCHC 31 1 g/dL      RDW 15 9 %      MPV 11 4 fL      Platelets 651 Thousands/uL      nRBC 0 /100 WBCs      Neutrophils Relative 63 %      Immat GRANS % 1 %      Lymphocytes Relative 20 %      Monocytes Relative 15 %      Eosinophils Relative 1 %      Basophils Relative 0 %      Neutrophils Absolute 5 34 Thousands/µL      Immature Grans Absolute 0 06 Thousand/uL      Lymphocytes Absolute 1 67 Thousands/µL      Monocytes Absolute 1 27 Thousand/µL      Eosinophils Absolute 0 08 Thousand/µL      Basophils Absolute 0 02 Thousands/µL     Urine Microscopic [[id number]]  (Abnormal) Collected: 01/17/23 1634    Lab Status: Final result Specimen: Urine, Clean Catch Updated: 01/17/23 1711     RBC, UA 1-2 /hpf      WBC, UA 4-10 /hpf      Epithelial Cells None Seen /hpf      Bacteria, UA None Seen /hpf      Hyaline Casts, UA 5-10 /lpf      Budding Yeast Present    UA w Reflex to Microscopic w Reflex to Culture [[id number]]  (Abnormal) Collected: 01/17/23 1634    Lab Status: Final result Specimen: Urine, Clean Catch Updated: 01/17/23 1659     Color, UA Yellow     Clarity, UA Clear     Specific Gravity, UA 1 013     pH, UA 6 0     Leukocytes, UA Trace     Nitrite, UA Negative     Protein, UA Negative mg/dl      Glucose, UA Negative mg/dl      Ketones, UA Negative mg/dl      Urobilinogen, UA 2 0 mg/dl      Bilirubin, UA Negative     Occult Blood, UA Negative    Fingerstick Glucose (POCT) [[id number]]  (Abnormal) Collected: 01/17/23 1611    Lab Status: Final result Updated: 01/17/23 1612     POC Glucose 225 mg/dl                  X-ray chest 1 view portable   Final Result by Lia Montilla MD (01/17 1712)      No acute cardiopulmonary disease  Workstation performed: [id number]         XR knee 4+ vw right injury   Final Result by Lex Hua MD (01/17 2001)      No acute osseous abnormality  Workstation performed: [id number]         CT head without contrast   Final Result by Farheen Mcmanus MD (01/17 1644)      No acute intracranial abnormality  Stable bilateral maxillary sinus mucosal thickening with high density material on the right  This may indicate inspissated secretions or fungal colonization              Workstation performed: [id number]               Procedures  ECG 12 Lead Documentation Only    Date/Time: 1/25/2023 10:24 PM  Performed by: Shelly Harry DO  Authorized by: Shelly Harry DO     Indications / Diagnosis:  Hypotension  ECG reviewed by me, the ED Provider: yes    Patient location:  ED  Interpretation:     Interpretation: normal    Rate:     ECG rate:  72    ECG rate assessment: normal    Rhythm:     Rhythm: sinus rhythm    Ectopy:     Ectopy: none    QRS:     QRS axis:  Normal  Conduction:     Conduction: normal    ST segments:     ST segments:  Non-specific  T waves:     T waves: normal            ED Course                                       Medical Decision Making  60-year-old male with past medical history of adrenal insufficiency and history of multiple strokes currently on blood thinning medicine presents for evaluation of hypotension at home  With a static hypotension history of atrial insufficiency  Patient is at his baseline  Patient is normotensive  Emergency department home and ambulatory negative head CT, shared decision-making was made with family and patient and family patient comfortable taking patient back home into their care  Patient is given follow-up and return precautions  History of adrenal insufficiency: complicated acute illness or injury  Orthostatic hypotension: self-limited or minor problem  Amount and/or Complexity of Data Reviewed  Labs: ordered  Radiology: ordered  Disposition  Final diagnoses:   Orthostatic hypotension   History of adrenal insufficiency     Time reflects when diagnosis was documented in both MDM as applicable and the Disposition within this note     Time User Action Codes Description Comment    1/17/2023  6:57 PM Delores Grijalva Add [I95 1] Orthostatic hypotension     1/17/2023  6:57 PM Delores Grijalva Add [Z86 39] History of adrenal insufficiency       ED Disposition     ED Disposition   Discharge    Condition   Stable    Date/Time   Tue Jan 17, 2023  6:56 PM    [address] discharge to home/self care                 Follow-up Information     Follow up With Specialties Details Why Contact Info Additional [address] Emergency Department Emergency Medicine Go to  If symptoms worsen or if you have additional concerns [address] [id number]  8 [address] Emergency Department, [address], [place], [place], [address]    Jessica Solis MD Family Medicine Schedule an appointment as soon as possible for a visit   [address]  [phone]             Discharge Medication List as of 1/17/2023  6:58 PM      CONTINUE these medications which have NOT CHANGED    Details   apixaban (ELIQUIS) 5 mg Take 1 tablet (5 mg total) by mouth 2 (two) times a day, Starting Tue 11/8/2022, Normal      aspirin 81 mg chewable tablet Chew 81 mg daily, Historical Med      atorvastatin (LIPITOR) 10 mg tablet Take 1 tablet by mouth daily, Starting Tue 7/12/2022, Historical Med      calcitriol (ROCALTROL) 0 25 mcg capsule Take 1 capsule (0 25 mcg total) by mouth daily, Starting Tue 11/8/2022, Normal      cyanocobalamin (VITAMIN B-12) 100 mcg tablet Take by mouth daily, Starting Thu 9/29/2022, Historical Med      fludrocortisone (FLORINEF) 0 1 mg tablet Take 2 tablets (0 2 mg total) by mouth daily, Starting Tue 11/8/2022, Until Wed 11/8/2023, Normal      hydrocortisone (CORTEF) 5 mg tablet Take 15mg(3 tabs) on waking up and  10mg(2 tabs) in evening, Normal      insulin glargine (Toujeo SoloStar) 300 units/mL CONCENTRATED U-300 injection pen (1-unit dial) Inject 18 Units under the skin daily at bedtime, Starting Fri 1/13/2023, No Print      !! Insulin Pen Needle (BD Pen Needle Blessing 2nd Gen) 32G X 4 MM MISC For use with insulin pen  Pharmacy may dispense brand covered by insurance , Normal      !! Insulin Pen Needle (BD Pen Needle Blessing 2nd Gen) 32G X 4 MM MISC For use with insulin pen   Pharmacy may dispense brand covered by insurance , Normal      midodrine (PROAMATINE) 10 MG tablet Take 1 tablet (10 mg total) by mouth 3 (three) times a day before meals, Starting Fri 1/13/2023, Normal      potassium chloride (Klor-Con) 10 mEq tablet Take 20 mEq by mouth 2 (two) times a day, Starting Thu 10/13/2022, Until Fri 10/13/2023, Historical Med      risperiDONE (RisperDAL) 1 mg tablet Take 1 tablet by mouth 2 (two) times a day, Historical Med      sertraline (ZOLOFT) 100 mg tablet Take 100 mg by mouth daily, Starting Wed 3/16/2022, Until Thu 3/16/2023, Historical Med      bisacodyl (DULCOLAX) 5 mg EC tablet Take 2 tablets (10 mg total) by mouth once for 1 dose, Starting Thu 12/29/2022, Normal      insulin lispro (HumaLOG KwikPen) 100 units/mL injection pen Inject 5 Units under the skin 3 (three) times a day with meals 7 with breakfast, 6 units with lunch and dinner  Plus sliding scale, Starting Fri 1/13/2023, No Print       !! - Potential duplicate medications found  Please discuss with provider  No discharge procedures on file  PDMP Review     None           ED Provider  Attending physically available and evaluated Lashanda Cheng I managed the patient along with the ED Attending      Electronically Signed by         Franco Delaney DO  01/25/23 0771

## 2023-01-26 NOTE — TELEPHONE ENCOUNTER
Patient's home care nurse called  For the past couple weeks patient has been hypotensive  bp was in the 70-80/40-50's range and having frequent falls  He was seen in the ED on 1/17/23  Over the past few days there has been some improvement , the highest bp over the past 3-4 days was 118/60  He is currently taking midodrine 10mg 3 times daily, florinef 0 2mg daily and hydrocortisone 15mg in the am and 10m gin the pm  His daughter also monitors his bp and symptoms closely at home  He has a f/u appointment with you on 2/23/23   Please advise

## 2023-01-26 NOTE — TELEPHONE ENCOUNTER
Current BPs ok  Make sure he's not on amlodipine  That was a med he was on in th epast   Continue midodrine and florinef  Adequate salt and hydration  If still having episodes, start NaCl 1g three times a day

## 2023-01-27 RX ORDER — SODIUM CHLORIDE 1000 MG
1 TABLET, SOLUBLE MISCELLANEOUS 3 TIMES DAILY
Qty: 270 TABLET | Refills: 3 | Status: SHIPPED | OUTPATIENT
Start: 2023-01-27

## 2023-01-27 NOTE — TELEPHONE ENCOUNTER
Spoke to [id number]  Relayed same information  Not sure if he is compliant with medications  Daughter does help him  Call made to daughter, no response  Midodrine is TID, so if not taking, then need to find a way to have him compliant with that first   If he is taking it, then I would start Salt tabs which I will order  Please try and get in touch with Daughter on Monday and see how his compliance has been and if she is helping administer all medications

## 2023-01-27 NOTE — TELEPHONE ENCOUNTER
Called patient's daughter he is not taking amlodipine and she will have his home care nurse call me with an update on bp symptoms and further instructions if needed

## 2023-01-27 NOTE — TELEPHONE ENCOUNTER
Jonel Nunez RN from [facility] left v/m that pt has low bp and had another fall today  She is requesting a call back at

## 2023-01-27 NOTE — TELEPHONE ENCOUNTER
Shan Mcneill , today his bp was 78/45 and he had another fall  Home care physician recommended ER and he refused  He is non compliant with his diet and also is refusing  for a home health aide

## 2023-01-29 DIAGNOSIS — N18.5 STAGE 5 CHRONIC KIDNEY DISEASE NOT ON CHRONIC DIALYSIS (HCC): ICD-10-CM

## 2023-01-30 RX ORDER — CALCITRIOL 0.25 UG/1
0.25 CAPSULE, LIQUID FILLED ORAL DAILY
Qty: 30 CAPSULE | Refills: 0 | Status: SHIPPED | OUTPATIENT
Start: 2023-01-30

## 2023-01-30 NOTE — TELEPHONE ENCOUNTER
Called patient's daughter , she does give him midodrine TID  This morning while sitting his bp was 72/4? Repeat was 90/? Hr 80  This weekend the highest systolic number was 046 and 108 and this is not until later in the day  The patient and his daughter do not want to keep going back to the ER   I advised her you would order salt tabs, please send to [facility]

## 2023-02-01 ENCOUNTER — DOCUMENTATION (OUTPATIENT)
Dept: OTHER | Facility: HOSPITAL | Age: 68
End: 2023-02-01

## 2023-02-02 NOTE — PROGRESS NOTES
CGM data review[de-identified]  Device: dexcom Dates: 1/17/23 - 1/30/23 Usage: 93 % Av glu: 206 mg/dL  SD:72  mg/dL GMI: 8 2  %  TIR: 41 %  TAR: 32+26 % TBR:1   %    Glycemic patters:  Fasting and postprandial hyperglycemia on majority days but a few days were completely normoglycemic  Hypoglycemia: No    Recommendations:  Continue current regimen of insulin  Try to maintain similar carbohydrate consistent diet daily

## 2023-02-06 ENCOUNTER — TELEPHONE (OUTPATIENT)
Dept: PULMONOLOGY | Facility: CLINIC | Age: 68
End: 2023-02-06

## 2023-02-06 NOTE — TELEPHONE ENCOUNTER
Messaged provider via tiger connect and received response  Im off today but is this the carlo who I started salt tablets on? If he’s symptomatic with that low a bp he needs to go to er and probably needs to be hydrated           Daughter notified

## 2023-02-06 NOTE — TELEPHONE ENCOUNTER
Patient daughter called stating that patient BP was low all weekend, having dizziness and headache BP this morning while seating 60/48       Patient daughter unsure what next steps should be     Next appointment currently 2/23

## 2023-02-07 ENCOUNTER — OFFICE VISIT (OUTPATIENT)
Dept: CARDIOLOGY CLINIC | Facility: CLINIC | Age: 68
End: 2023-02-07

## 2023-02-07 ENCOUNTER — TELEMEDICINE (OUTPATIENT)
Dept: HEMATOLOGY ONCOLOGY | Facility: CLINIC | Age: 68
End: 2023-02-07

## 2023-02-07 VITALS
OXYGEN SATURATION: 98 % | HEIGHT: 64 IN | SYSTOLIC BLOOD PRESSURE: 110 MMHG | BODY MASS INDEX: 30.9 KG/M2 | DIASTOLIC BLOOD PRESSURE: 60 MMHG | HEART RATE: 75 BPM | WEIGHT: 181 LBS | RESPIRATION RATE: 17 BRPM

## 2023-02-07 DIAGNOSIS — E27.40 ADRENAL INSUFFICIENCY (HCC): Primary | ICD-10-CM

## 2023-02-07 DIAGNOSIS — D50.9 IRON DEFICIENCY ANEMIA, UNSPECIFIED IRON DEFICIENCY ANEMIA TYPE: ICD-10-CM

## 2023-02-07 DIAGNOSIS — D63.8 ANEMIA OF CHRONIC DISEASE: Primary | ICD-10-CM

## 2023-02-07 DIAGNOSIS — R76.8 ELEVATED SERUM IMMUNOGLOBULIN FREE LIGHT CHAINS: ICD-10-CM

## 2023-02-07 DIAGNOSIS — E46 PROTEIN-CALORIE MALNUTRITION, UNSPECIFIED SEVERITY (HCC): ICD-10-CM

## 2023-02-07 DIAGNOSIS — I95.1 ORTHOSTATIC HYPOTENSION: ICD-10-CM

## 2023-02-07 DIAGNOSIS — I48.0 PAROXYSMAL ATRIAL FIBRILLATION (HCC): ICD-10-CM

## 2023-02-07 DIAGNOSIS — R55 SYNCOPE: ICD-10-CM

## 2023-02-07 RX ORDER — MIDODRINE HYDROCHLORIDE 10 MG/1
15 TABLET ORAL
Qty: 90 TABLET | Refills: 0
Start: 2023-02-07

## 2023-02-07 RX ORDER — SODIUM CHLORIDE 9 MG/ML
20 INJECTION, SOLUTION INTRAVENOUS ONCE
OUTPATIENT
Start: 2023-02-15

## 2023-02-07 NOTE — PROGRESS NOTES
Cardiology Follow Up    Bhargavi Harris  [id number]  1955  [facility] [place]  [address]  [address]  [place] [id number] [phone]      1  Adrenal insufficiency         2  Orthostatic hypotension        3  Paroxysmal atrial fibrillation         4  Syncope  midodrine (PROAMATINE) 10 MG tablet          Discussion/Summary:    Orthostatic hypotension and even resting hypotension seem to be the major issue currently  I have gotten several messages recently that his blood pressures have been running low  This is despite now being off of all antihypertensive medications, consistently taking midodrine 10 mg 3 times a day, and Florinef 0 2 mg twice a day  He takes hydrocortisone 15 mg in the morning and 10 mg in the evening  Blood pressures have been as low as the 61A systolic and he was 80 systolic when I took his blood pressure sitting, and it did not drop further when he stood  Generally, the highest dose of midodrine is 10 mg 3 times daily, but I have seen 15 used with some success  Florinef going above 0 2 will likely just increase side effects but not necessarily increase his blood pressure  Salt tablets are being given  I also advised to increase sodium intake and adequate hydration  Can use Gatorade, etc  instead of just water  There is history of CVA  He has significant autonomic dysregulation  [place] is a consideration, but this is historically been very difficult for me to get approved, would defer to neurology if this is appropriate  We will also message the team and see if anybody else has other suggestions  I will specifically if endocrinology would repeat any testing or adjust the steroid dosing      Ultimately, I did explain to the patient and his daughter that if he is symptomatic and his blood pressure is running that low, our recommendation will be that he go to the emergency room where he will need IV hydration, at least  She says that he has been somewhat resistant to this, but tried to explain with me present that this was our recommendation if he has symptomatic hypotension  We will keep my follow-up visit with him in a few weeks and reassess blood pressure at the time  History of Present Illness:  73-year-old gentleman comes to the office today for the 1st time  He is accompanied by his daughter  His daughter is a CNA  She helps to provide some collateral history  I have reviewed the medical record extensively  He has had some hospitalizations at [facility], and previously saw cardiologist in the [facility]  History of adrenal insufficiency and orthostatic hypotension with autonomic insufficiency in this setting  He had been on midodrine and Florinef  He had an episode of DKA  He was critically ill in the hospital   At that time, his daughter tells me that they told her that they were concerned there was an MI that precipitated his DKA  That said, I reviewed the cardiology consultation  And his testing  He had a preserved ejection fraction on his echocardiogram, although the troponin went to greater than 15,000  He did not have a cardiac catheterization  He ultimately had a pharmacologic nuclear stress test which was read as normal   After his discharge, he had a few episodes of hypoglycemia and some loss of consciousness episodes which prompted readmission or rehospitalization  He did have paroxysmal atrial fibrillation during 1 of his ICU stays and has been on Eliquis  There was also imaging done that showed evidence of CVA  When I first saw him, I took him off amlodipine  Interval History:  Since visit with me, he has seen nephrology consultation  No changes were made at that time, but they did indicate that if he were having ongoing episodes of orthostasis, then would add salt tablets   When they saw him, he was taking the midodrine regularly 3 times a day at 10 mg     I have been getting messages more recently that he has been hypotensive  Daughter showed me the blood pressure log  His blood pressures on several occasions have been in the 80s and more recently have been even lower than that  I had gotten phone calls from the visiting nurse regarding the same  He was started on salt tablets which she is taking  He is doing well with fluid intake  Patient is giving limited history, but has complained to the daughter that he feels lightheaded  Over the weekend, his blood pressures were significantly low sometimes in the 60s  I got a message yesterday and advised that if he was symptomatic with blood pressures that low, he should go to the emergency room  Seems the daughter told him the same, but the patient declined  Was able to make a visit for today  I checked his blood pressure  When he was sitting in the chair, his blood pressure was 80 systolic  I had him stand and retook it, it did seem to drop more than 10 points  He has been feeling lightheaded but has not passed out  Daughter does feel like he has been unsteady at times and seems like he is coming close  He is going to start iron infusion therapy somewhat soon, hopefully      Patient Active Problem List   Diagnosis   • Recurrent hypoglycemia   • Insulin dependent type 1 diabetes mellitus    • Adrenal insufficiency    • Essential hypertension   • Paroxysmal atrial fibrillation    • History of stroke   • Anemia of chronic disease   • Psychiatric disorder   • Type 2 MI (myocardial infarction) (HCC)   • Orthostatic hypotension   • Unintentional weight loss   • Unspecified protein-calorie malnutrition (HCC)   • Acute encephalopathy   • Syncope   • Iron deficiency anemia, unspecified     Past Medical History:   Diagnosis Date   • Diabetes mellitus ([facility] )    • Obesity, morbid ([facility] ) 11/14/2022     Social History     Tobacco Use   • Smoking status: Former     Packs/day: 0 25     Years: 30 00     Pack years: 7 50     Types: Cigarettes   • Smokeless tobacco: Never   Vaping Use   • Vaping Use: Never used   Substance Use Topics   • Alcohol use: Not Currently     Alcohol/week: 5 0 standard drinks     Types: 5 Cans of beer per week     Comment: Quit in august   • Drug use: Never      No family history on file  No past surgical history on file  Current Outpatient Medications:   •  apixaban (ELIQUIS) 5 mg, Take 1 tablet (5 mg total) by mouth 2 (two) times a day, Disp: 60 tablet, Rfl: 2  •  atorvastatin (LIPITOR) 10 mg tablet, Take 1 tablet by mouth daily, Disp: , Rfl:   •  calcitriol (ROCALTROL) 0 25 mcg capsule, Take 1 capsule (0 25 mcg total) by mouth daily, Disp: 30 capsule, Rfl: 0  •  cyanocobalamin (VITAMIN B-12) 100 mcg tablet, Take by mouth daily, Disp: , Rfl:   •  fludrocortisone (FLORINEF) 0 1 mg tablet, Take 2 tablets (0 2 mg total) by mouth daily, Disp: 60 tablet, Rfl: 5  •  hydrocortisone (CORTEF) 5 mg tablet, Take 15mg(3 tabs) on waking up and  10mg(2 tabs) in evening, Disp: 180 tablet, Rfl: 1  •  insulin lispro (HumaLOG) 100 units/mL injection pen, INJECT 5 UNITS UNDER THE SKIN 3 TIMES A DAY WITH MEALS 7 WITH BREAKFAST, 6 UNITS WITH LUNCH AND DINNER  PLUS SLIDING SCALE, Disp: 15 mL, Rfl: 0  •  Insulin Pen Needle (BD Pen Needle Blessing 2nd Gen) 32G X 4 MM MISC, For use with insulin pen  Pharmacy may dispense brand covered by insurance , Disp: 100 each, Rfl: 0  •  Insulin Pen Needle (BD Pen Needle Blessing 2nd Gen) 32G X 4 MM MISC, For use with insulin pen   Pharmacy may dispense brand covered by insurance , Disp: 100 each, Rfl: 2  •  Lantus SoloStar 100 units/mL SOPN, , Disp: , Rfl:   •  midodrine (PROAMATINE) 10 MG tablet, Take 1 5 tablets (15 mg total) by mouth 3 (three) times a day before meals, Disp: 90 tablet, Rfl: 0  •  potassium chloride (Klor-Con) 10 mEq tablet, Take 20 mEq by mouth 2 (two) times a day, Disp: , Rfl:   •  risperiDONE (RisperDAL) 1 mg tablet, Take 1 tablet by mouth 2 (two) times a day, Disp: , Rfl:   •  sertraline (ZOLOFT) 100 mg tablet, Take 100 mg by mouth daily, Disp: , Rfl:   •  sodium chloride 1 g tablet, Take 1 tablet (1 g total) by mouth 3 (three) times a day, Disp: 270 tablet, Rfl: 3  •  insulin glargine (Toujeo SoloStar) 300 units/mL CONCENTRATED U-300 injection pen (1-unit dial), Inject 18 Units under the skin daily at bedtime (Patient not taking: Reported on 1/19/2023), Disp: 15 mL, Rfl: 0  •  polyethylene glycol (GOLYTELY) 4000 mL solution, Take 4,000 mL by mouth once for 1 dose (Patient not taking: Reported on 2/7/2023), Disp: 4000 mL, Rfl: 0  Allergies   Allergen Reactions   • Imipramine Other (See Comments)   • Lisinopril Other (See Comments)   • Paroxetine Other (See Comments)   • Penicillins Hives   • Rosiglitazone Other (See Comments)   • Troglitazone Other (See Comments)       Vitals:    02/07/23 1505   BP: 110/60   BP Location: Right arm   Patient Position: Sitting   Cuff Size: Standard   Pulse: 75   Resp: 17   SpO2: 98%   Weight: 82 1 kg (181 lb)   Height: 5' 4" (1 626 m)     Vitals:    02/07/23 1505   Weight: 82 1 kg (181 lb)      Height: 5' 4" (162 6 cm)   Body mass index is 31 07 kg/m²  Physical Exam:  GEN: Stampsy Hockey appears somewhat chronically ill  HEENT: pupils equal, round, and reactive to light; extraocular muscles intact  NECK: supple, no carotid bruits   HEART: regular  No murmurs  LUNGS: clear to auscultation bilaterally; no wheezes, rales, or rhonchi   ABDOMEN: normal bowel sounds, soft, no tenderness, no distention  EXTREMITIES: no edema  NEURO: flat affect  SKIN: normal without suspicious lesions on exposed skin    ROS:  Positive for edema, weakness, gait difficulty  Poor memory/recall  Lightheaded  Except as noted in HPI, is otherwise reviewed in detail and a 12 point review of systems is negative    ROS reviewed and is unchanged    Labs:  Lab Results   Component Value Date    SODIUM 140 01/17/2023    K 4 2 01/17/2023     01/17/2023 CREATININE 1 28 01/17/2023    BUN 20 01/17/2023    CO2 29 01/17/2023    ALT 17 01/17/2023    AST 9 01/17/2023    HGBA1C 7 4 (H) 01/24/2023    WBC 8 44 01/17/2023    HGB 9 7 (L) 01/17/2023    HCT 31 2 (L) 01/17/2023     01/17/2023       No results found for: CHOL  No results found for: HDL  No results found for: LDLCALC  No results found for: TRIG    Testing:  Pharm Stress 8/22/22: This result has an attachment that is not available  1  No abnormal ST/T changes with pharmacologic stress  2  Successful administration of pharmacologic stress  3  Nuclear perfusion imaging to follow and to be reported separately by   Radiology  Resting ECG   ECG is abnormal  non specific t wave abnormality The ECG shows normal sinus rhythm  Stress Findings   A pharmacological stress test was performed using 0 4 mg of regadenoson IV push over 10 seconds  The patient had a maximal HR of 89 bpm (57 % of MPHR) 1 0 METS  The patient reached the end of the protocol  The patient reported no symptoms during the stress test  Blood pressure demonstrated a normal response and heart rate demonstrated a normal response to stress  The patient's heart rate recovery was normal      Stress ECG   No ST deviation was noted  There were no arrhythmias during stress  There were no arrhythmias during recovery  Fixed and mildly decreased perfusion is seen at small portion of proximal   inferior wall, likely secondary to mild diaphragmatic attenuation  There is no   evidence of reversible perfusion defect to suggest ischemia  No significant   transient ischemic dilatation is seen on the stress phase images  There is   slightly heterogeneous, but normal radiotracer labeling throughout the rest of   the left ventricular myocardium  There is normal left ventricular wall motion  The calculated left ventricular   ejection fraction is 66% which is above the lower limit of normal at 45%      Echo 8/17/22  •  Left Ventricle: Left ventricle is normal in size  Systolic function is   normal with an ejection fraction of 55-60%  There is grade I (mild)   diastolic dysfunction  •  Right Ventricle: Systolic function is normal    •  Aortic Valve: The aortic valve is trileaflet  The leaflets are mildly   thickened  The leaflets are mildly calcified  There is mild regurgitation  •  Mitral Valve: There is annular calcification  There is trace   regurgitation  •  Tricuspid Valve: There is trace regurgitation  •  Pulmonic Valve: There is trace regurgitation   PA pressure not   calculated due to incomplete TR signal

## 2023-02-07 NOTE — PROGRESS NOTES
Virtual Brief Visit    Patient is located in the following state in which I hold an active license [place]    Assessment/Plan:    Problem List Items Addressed This Visit        Other    Anemia of chronic disease - Primary    Iron deficiency anemia, unspecified    Patient is a 72-year-old male with a history of adrenal insufficiency, hypertension, type 2 diabetes who was initially referred to us after hospitalization  He had elevated kappa lambda ratio at 2 03 however SPEP showed no monoclonal bands  He does have anemia likely secondary to iron deficiency iron saturation 12% and ferritin level 25  We will make arrangements for Feraheme infusions 510 mg IV x2 doses  We also checked erythropoietin level which was elevated at 40 6 however may not correlate with a hemoglobin of 9 7  This visit is a virtual visit with patient's daughter  She reports patient has dizziness and lightheadedness with low blood pressure the morning of the visit  She states his blood pressure has been in the 60s over 40s or 70s over 40s when he is lying down  He has an appointment with cardiology later today  He follows with nephrology who has him taking salt tablets and midodrine  Quantitative immunoglobulins are within normal limits, light chains are both elevated with a ratio of 2 16  We will continue to monitor for now  I do not suspect patient has MGUS or multiple myeloma given the negative SPEP  We will see patient in 3 months with repeat blood work  Patient's daughter verbalized understanding and is in agreement with the plan  Recent Visits  Date Type Provider Dept   02/08/23 Telephone Neville Rodriguez [facility]   02/07/23 Telemedicine NARA Humphries [facility]   Showing recent visits within past 7 days and meeting all other requirements  Future Appointments  No visits were found meeting these conditions    Showing future appointments within next 150 days and meeting all other requirements     Visit Time    Visit Start Time: 5444  Visit Stop Time: 6866  Total Visit Duration: 25 minutes

## 2023-02-08 ENCOUNTER — TELEPHONE (OUTPATIENT)
Dept: HEMATOLOGY ONCOLOGY | Facility: CLINIC | Age: 68
End: 2023-02-08

## 2023-02-08 DIAGNOSIS — E27.40 ADRENAL INSUFFICIENCY (HCC): ICD-10-CM

## 2023-02-08 RX ORDER — HYDROCORTISONE 5 MG/1
TABLET ORAL
Qty: 180 TABLET | Refills: 1 | Status: SHIPPED | OUTPATIENT
Start: 2023-02-08

## 2023-02-08 NOTE — PROGRESS NOTES
Called to review with patient given concern for orthostasis by cardiology - left message  From an endocrine perspective: Increasing hydrocortisone will have minimal additive effect on top of fludrocortisone but will increase to supraphysiological dose to r/o possible adrenal insufficiency contributing  Diagnoses and all orders for this visit:    Adrenal insufficiency   -     hydrocortisone (CORTEF) 5 mg tablet;  Take 20mg(4 tabs) on waking up and  15mg(3 tabs) in evening

## 2023-02-09 DIAGNOSIS — D63.8 ANEMIA OF CHRONIC DISEASE: Primary | ICD-10-CM

## 2023-02-23 ENCOUNTER — TELEMEDICINE (OUTPATIENT)
Dept: CARDIOLOGY CLINIC | Facility: CLINIC | Age: 68
End: 2023-02-23

## 2023-02-23 DIAGNOSIS — E27.40 ADRENAL INSUFFICIENCY (HCC): Primary | ICD-10-CM

## 2023-02-23 DIAGNOSIS — I48.0 PAROXYSMAL ATRIAL FIBRILLATION (HCC): ICD-10-CM

## 2023-02-23 DIAGNOSIS — I95.1 ORTHOSTATIC HYPOTENSION: ICD-10-CM

## 2023-02-23 PROBLEM — I10 ESSENTIAL HYPERTENSION: Status: RESOLVED | Noted: 2022-10-19 | Resolved: 2023-02-23

## 2023-02-23 RX ORDER — SODIUM CHLORIDE 146 MG/ML
1 INJECTION, SOLUTION INTRAVENOUS 3 TIMES DAILY
COMMUNITY
Start: 2023-02-07

## 2023-02-23 NOTE — PROGRESS NOTES
Virtual Brief Visit    Patient is located in the following state in which I hold an active license [place]    Patient was scheduled for an in person visit, but family requested changing to virtual  Telephone only available  Seen for orthostatic hypotension recently  I increased his midodrine  He is on Florinef  Has adrenal insufficiency  Continues to have significant symptoms  Is getting a lot of home care with the nurses and etc  He is using compression stockings, abdominal binder  Visiting nurses were very adamant about this  However his daughter tracks his blood pressure very regularly 3 times a day  Not noticing a lot of improvement when they were using this  Tries to stay adequately hydrated  Increase sodium and actually put him on salt tablets  Despite all of this, continues to have orthostatic symptoms  Daughter is very careful walking behind him  Essentially has someone with him at all times  He has follow-up with the neurologist  We discussed possibility of Northera    After last visit, I sent a message to his endocrinologist, as well  Talked about increasing the dose of steroids to supraphysiologic which was done  This, as well, has not really produce much benefit  Assessment/Plan:    Continues to have significant orthostatic hypotension despite essentially max dose of Florinef, midodrine, salt tablets, steroids with adrenal insufficiency  He continues to use the compression stockings and abdominal binder  He sometimes will not wear these because he has not found a lot of benefit in them and they are cumbersome  However, the combination of all of these continues to be ineffective  Discussed with the daughter that we need to continue with these, as we are otherwise limited  Has follow-up scheduled with the neurologist  We will discuss any other potential pharmacologic options  He clearly has significant autonomic dysfunction  [place] would be a consideration      She asked about scheduling GI procedures for him  There is no cardiac contraindication  Anesthesiologist certainly could use vasopressors temporarily while sedation is being given  The bigger concern I have is actually when he does the prep  He would need to remain very adequately hydrated with water while he does this  Problem List Items Addressed This Visit        Endocrine    Adrenal insufficiency  - Primary       Cardiovascular and Mediastinum    Paroxysmal atrial fibrillation     Orthostatic hypotension       Recent Visits  No visits were found meeting these conditions  Showing recent visits within past 7 days and meeting all other requirements  Today's Visits  Date Type Provider Dept   02/23/23 Telemedicine Sarah Harris MD [facility]   Showing today's visits and meeting all other requirements  Future Appointments  No visits were found meeting these conditions    Showing future appointments within next 150 days and meeting all other requirements         Visit Time    Visit Start Time: 535  Visit Stop Time: 426  Total Visit Duration: 11 minutes

## 2023-02-24 DIAGNOSIS — R55 SYNCOPE: ICD-10-CM

## 2023-02-24 RX ORDER — MIDODRINE HYDROCHLORIDE 10 MG/1
15 TABLET ORAL
Qty: 135 TABLET | Refills: 11 | Status: SHIPPED | OUTPATIENT
Start: 2023-02-24

## 2023-02-25 DIAGNOSIS — N18.5 STAGE 5 CHRONIC KIDNEY DISEASE NOT ON CHRONIC DIALYSIS (HCC): ICD-10-CM

## 2023-02-25 RX ORDER — CALCITRIOL 0.25 UG/1
CAPSULE, LIQUID FILLED ORAL
Qty: 90 CAPSULE | Refills: 1 | Status: SHIPPED | OUTPATIENT
Start: 2023-02-25

## 2023-02-28 ENCOUNTER — TELEPHONE (OUTPATIENT)
Dept: CARDIOLOGY CLINIC | Facility: CLINIC | Age: 68
End: 2023-02-28

## 2023-02-28 NOTE — TELEPHONE ENCOUNTER
Pharmacy left v/m re: Midodrine and the dosage  Please call to confirm as the recommended maximum daily dose of medication is 30 mg but the script is for a total of 45 mg

## 2023-03-07 DIAGNOSIS — I21.A1 TYPE 2 MI (MYOCARDIAL INFARCTION) (HCC): ICD-10-CM

## 2023-03-07 RX ORDER — APIXABAN 5 MG/1
TABLET, FILM COATED ORAL
Qty: 60 TABLET | Refills: 1 | Status: SHIPPED | OUTPATIENT
Start: 2023-03-07

## 2023-03-23 ENCOUNTER — TELEPHONE (OUTPATIENT)
Dept: ENDOCRINOLOGY | Facility: CLINIC | Age: 68
End: 2023-03-23

## 2023-03-23 NOTE — TELEPHONE ENCOUNTER
Called pt's daughter to check pt in for his virtual visit, pt's daughter stated that she was not with the pt since he was not feeling well  I rescheduled the appt for 4/21/23  Pt's daughter stated that she wanted to speak with Dr Airam Patel since the pt needs more insulin and she wanted to discuss his sugars  Please advise

## 2023-03-24 ENCOUNTER — TREATMENT (OUTPATIENT)
Dept: OTHER | Facility: HOSPITAL | Age: 68
End: 2023-03-24

## 2023-03-24 DIAGNOSIS — E10.9 INSULIN DEPENDENT TYPE 1 DIABETES MELLITUS (HCC): ICD-10-CM

## 2023-03-24 DIAGNOSIS — E10.65 TYPE 1 DIABETES MELLITUS WITH HYPERGLYCEMIA (HCC): ICD-10-CM

## 2023-03-24 RX ORDER — BLOOD-GLUCOSE SENSOR
1 EACH MISCELLANEOUS
Qty: 3 EACH | Refills: 0 | Status: SHIPPED | OUTPATIENT
Start: 2023-03-24

## 2023-03-24 RX ORDER — INSULIN GLARGINE 300 U/ML
20 INJECTION, SOLUTION SUBCUTANEOUS
Qty: 15 ML | Refills: 0 | Status: SHIPPED | OUTPATIENT
Start: 2023-03-24

## 2023-03-24 RX ORDER — BLOOD-GLUCOSE,RECEIVER,CONT
1 EACH MISCELLANEOUS CONTINUOUS
Qty: 1 EACH | Refills: 0 | Status: SHIPPED | OUTPATIENT
Start: 2023-03-24

## 2023-03-24 NOTE — PROGRESS NOTES
Called and discussed with patient's daughter  She reports no hypoglycemia but some hyperglycemia mostly related to food intake by patient  She reports some false hypoglycemia reported on Dexcom sensor but not corroborated with fingerstick glucose  CGM data review[de-identified]  Device: Dexcom G6 dates: 3/10/2023-3/23/2023 usage: 93% Av glu: 254 mg/dL  SD: 78 mg/dL GMI: 9 4 %  TIR: 19% TAR: 32+49%  TBR:   %    Glycemic patters: Severe fasting and postprandial hyperglycemia  Hypoglycemia: No      Recommendation: We will switch to Dexcom G7 if approved  We will change insulin to as listed below  Advised to send CGM data in 4 to 6 weeks      Humalog Insulin    7u    6u    6u     Regular, Apidra, Humalog orNovolog Sliding Scale:   <80                      151-220 +1  +1 +1     221-290 +2 +2 +2 +   291-360 +3 +3 +3 +   361-430 +4 +4 +4 +   >430 +5 +5 +5 +         Lantus Insulin       20u

## 2023-03-28 ENCOUNTER — TELEPHONE (OUTPATIENT)
Dept: NEUROLOGY | Facility: CLINIC | Age: 68
End: 2023-03-28

## 2023-03-30 ENCOUNTER — OFFICE VISIT (OUTPATIENT)
Dept: NEUROLOGY | Facility: CLINIC | Age: 68
End: 2023-03-30

## 2023-03-30 VITALS
BODY MASS INDEX: 30.9 KG/M2 | HEART RATE: 77 BPM | SYSTOLIC BLOOD PRESSURE: 100 MMHG | WEIGHT: 181 LBS | DIASTOLIC BLOOD PRESSURE: 60 MMHG | HEIGHT: 64 IN

## 2023-03-30 DIAGNOSIS — I48.0 PAROXYSMAL ATRIAL FIBRILLATION (HCC): ICD-10-CM

## 2023-03-30 DIAGNOSIS — R68.89 SPELLS OF DECREASED ATTENTIVENESS: ICD-10-CM

## 2023-03-30 DIAGNOSIS — I63.9 CEREBROVASCULAR ACCIDENT (CVA) (HCC): Primary | ICD-10-CM

## 2023-03-30 DIAGNOSIS — Z86.73 HISTORY OF STROKE: ICD-10-CM

## 2023-03-30 RX ORDER — ASPIRIN 81 MG/1
81 TABLET, CHEWABLE ORAL DAILY
Qty: 30 TABLET | Refills: 0
Start: 2023-03-30

## 2023-03-30 NOTE — PROGRESS NOTES
Patient ID: Torito Mcneal is a 79 y o  male  Assessment/Plan:    History of stroke  Torito Mcneal is a 53-year-old male presenting to the neurology clinic for hospital follow-up  Cerebrovascular accident (CVA) ([address] )  Torito Mcneal is a 53-year-old male presenting to the neurology outpatient office for stroke follow-up  Patient presents alongside daughter  Currently on Lipitor 10 mg, Eliquis 5 mg BID  Daughter reports patient had stroke back in October 2022 which was noted during one of his hospitalizations  States since he has been having daily spells described as unresponsiveness, blank staring, hand clenching, intermittent associated urinary incontinence  MRI of the brain without contrast: 11/28/2022  -No MR evidence of acute ischemia  -Volume loss/atrophy appears advanced for patient's age  -Old left lacunar infarct    CT head without contrast: 1/17/2023  -No acute intracranial abnormalities  -Stable bilateral maxillary sinus mucosal thickening with high density material on the righta 53-year-old male presenting to the neurology clinic for hospital follow-up     Lab Results   Component Value Date/Time    HGBA1C 7 4 (H) 01/24/2023 09:27 AM    HGBA1C 7 5 (H) 07/22/2022 07:54 PM     Lab Results   Component Value Date/Time     01/24/2023 09:27 AM     (H) 07/22/2022 07:54 PM     Plan:  • CTA Head/Neck  • Ambulatory 48-hour EEG monitoring  • Referral to epileptologist  • Secondary stroke prevention:  o AP/AC: Continue Eliquis 5 mg BID, Will add ASA 81 mg  o Continue statin  o Smoking: patient counseled on importance of not smoking  o Recommend following a low salt, mediterranean diet   o Recommend routine physical exercise as tolerated   o Will defer to primary care team for monitoring of cholesterol panel and blood sugar numbers with target LDL cholesterol of less than 70 and hemoglobin A1c less than 7%  • Lifestyle management: patient counseled on importance of maintaining a healthy diet and regular exercise   • Continue PT/OT/ST as needed       Diagnoses and all orders for this visit:    Cerebrovascular accident (CVA) ([facility] )  -     aspirin 81 mg chewable tablet; Chew 1 tablet (81 mg total) daily  -     CTA head and neck w wo contrast; Future  -     Ambulatory EEG 48 Hours; Standing    History of stroke     Subjective:   Flori Singh is a 79 y o  male presenting to the neurology outpatient office for stroke follow up  Patient has been hospitalized multiple times over the last several months for multiple different reasons including but not limited to encephalopathy, CVA, recurrent hypoglycemia and orthostatic hypotension  Patient presents to the office alongside his daughter  He already of history obtained from patient's daughter  She states that over the last several months patient has had daily episodes of blank staring that occurs with both standing and sitting  During these events patient will clench his fist, arches his back and become unresponsive for several seconds to up to a minute  Daughter states that these events started occurring ever since the stroke several months ago back in October 2022  States that he has associated urinary incontinence and syncopal episodes with these spells intermittently  Daughter also notes that approximately 2 weeks ago patient had a extended episode that was different from his typical daily events consisting of dysarthria lasting around 30 minutes  Patient returned to baseline following event  Patient was not taken to the emergency department or evaluated by medical provider  Patient denies any acute changes  Denies any weakness of the arms or legs  Denies any numbness or tingling  No acute headaches or visual changes  The following portions of the patient's history were reviewed and updated as appropriate: \    He  has a past medical history of Diabetes mellitus ([facility] ) and Obesity, morbid ([facility] ) (11/14/2022)    He   Patient Active Problem List Diagnosis Date Noted   • Cerebrovascular accident (CVA) ([place] [address] ) 03/30/2023   • Iron deficiency anemia, unspecified 02/07/2023   • Syncope 01/06/2023   • Acute encephalopathy 12/07/2022   • Unspecified protein-calorie malnutrition ([place] [address] ) 12/01/2022   • Unintentional weight loss 11/29/2022   • Type 2 MI (myocardial infarction) ([place] [address] ) 11/03/2022   • Orthostatic hypotension 11/03/2022   • Recurrent hypoglycemia 10/19/2022   • Insulin dependent type 1 diabetes mellitus  10/19/2022   • Adrenal insufficiency  10/19/2022   • Paroxysmal atrial fibrillation  10/19/2022   • History of stroke 10/19/2022   • Anemia of chronic disease 10/19/2022   • Psychiatric disorder 10/19/2022     He  has no past surgical history on file  His family history includes Cancer in his father; Heart disease in his mother; Multiple sclerosis in his sister  He  reports that he has quit smoking  His smoking use included cigarettes  He has a 7 50 pack-year smoking history  He has never used smokeless tobacco  He reports that he does not currently use alcohol after a past usage of about 5 0 standard drinks per week  He reports that he does not use drugs    Current Outpatient Medications   Medication Sig Dispense Refill   • aspirin 81 mg chewable tablet Chew 1 tablet (81 mg total) daily 30 tablet 0   • atorvastatin (LIPITOR) 10 mg tablet Take 1 tablet by mouth daily     • calcitriol (ROCALTROL) 0 25 mcg capsule TAKE 1 CAPSULE BY MOUTH EVERY DAY 90 capsule 1   • Continuous Blood Gluc  (Dexcom G7 ) STEPHEN Use 1 each continuous 1 each 0   • Continuous Blood Gluc Sensor (Dexcom G7 Sensor) MISC Use 1 each every 7 days 3 each 0   • cyanocobalamin (VITAMIN B-12) 100 mcg tablet Take by mouth daily     • Eliquis 5 MG TAKE 1 TABLET BY MOUTH TWICE A DAY 60 tablet 1   • fludrocortisone (FLORINEF) 0 1 mg tablet Take 2 tablets (0 2 mg total) by mouth daily 60 tablet 5   • hydrocortisone (CORTEF) 5 mg tablet Take 20mg(4 tabs) on waking up and  15mg(3 tabs) in evening 180 tablet 1   • insulin glargine (Toujeo SoloStar) 300 units/mL CONCENTRATED U-300 injection pen (1-unit dial) Inject 20 Units under the skin daily at bedtime 15 mL 0   • insulin lispro (HumaLOG) 100 units/mL injection pen Use 6 units of breakfast, 7 units with lunch and 7 units with dinner +1 unit per 50 above 150 mg/dL; maximum daily dose 40 units 15 mL 2   • Insulin Pen Needle (BD Pen Needle Blessing 2nd Gen) 32G X 4 MM MISC For use with insulin pen  Pharmacy may dispense brand covered by insurance  100 each 0   • Insulin Pen Needle (BD Pen Needle Blessing 2nd Gen) 32G X 4 MM MISC For use with insulin pen  Pharmacy may dispense brand covered by insurance  100 each 3   • midodrine (PROAMATINE) 10 MG tablet TAKE 1 AND 1/2 TABLETS (15 MG TOTAL) BY MOUTH 3 (THREE) TIMES A DAY BEFORE MEALS 405 tablet 4   • potassium chloride (Klor-Con) 10 mEq tablet Take 20 mEq by mouth 2 (two) times a day     • risperiDONE (RisperDAL) 1 mg tablet Take 1 tablet by mouth 2 (two) times a day     • sodium chloride 1 g tablet Take 1 tablet (1 g total) by mouth 3 (three) times a day 270 tablet 3   • sodium chloride, concentrated, 2 5 MEQ/ML Take 1 g by mouth Three times a day     • Lantus SoloStar 100 units/mL SOPN  (Patient not taking: Reported on 3/30/2023)     • sertraline (ZOLOFT) 100 mg tablet Take 100 mg by mouth daily       No current facility-administered medications for this visit       Current Outpatient Medications on File Prior to Visit   Medication Sig   • atorvastatin (LIPITOR) 10 mg tablet Take 1 tablet by mouth daily   • calcitriol (ROCALTROL) 0 25 mcg capsule TAKE 1 CAPSULE BY MOUTH EVERY DAY   • Continuous Blood Gluc  (Dexcom G7 ) STEPHEN Use 1 each continuous   • Continuous Blood Gluc Sensor (Dexcom G7 Sensor) MISC Use 1 each every 7 days   • cyanocobalamin (VITAMIN B-12) 100 mcg tablet Take by mouth daily   • Eliquis 5 MG TAKE 1 TABLET BY MOUTH TWICE A DAY   • fludrocortisone (FLORINEF) 0 1 mg "tablet Take 2 tablets (0 2 mg total) by mouth daily   • hydrocortisone (CORTEF) 5 mg tablet Take 20mg(4 tabs) on waking up and  15mg(3 tabs) in evening   • insulin glargine (Toujeo SoloStar) 300 units/mL CONCENTRATED U-300 injection pen (1-unit dial) Inject 20 Units under the skin daily at bedtime   • insulin lispro (HumaLOG) 100 units/mL injection pen Use 6 units of breakfast, 7 units with lunch and 7 units with dinner +1 unit per 50 above 150 mg/dL; maximum daily dose 40 units   • Insulin Pen Needle (BD Pen Needle Blessing 2nd Gen) 32G X 4 MM MISC For use with insulin pen  Pharmacy may dispense brand covered by insurance  • Insulin Pen Needle (BD Pen Needle Blessing 2nd Gen) 32G X 4 MM MISC For use with insulin pen  Pharmacy may dispense brand covered by insurance  • midodrine (PROAMATINE) 10 MG tablet TAKE 1 AND 1/2 TABLETS (15 MG TOTAL) BY MOUTH 3 (THREE) TIMES A DAY BEFORE MEALS   • potassium chloride (Klor-Con) 10 mEq tablet Take 20 mEq by mouth 2 (two) times a day   • risperiDONE (RisperDAL) 1 mg tablet Take 1 tablet by mouth 2 (two) times a day   • sodium chloride 1 g tablet Take 1 tablet (1 g total) by mouth 3 (three) times a day   • sodium chloride, concentrated, 2 5 MEQ/ML Take 1 g by mouth Three times a day   • Lantus SoloStar 100 units/mL SOPN  (Patient not taking: Reported on 3/30/2023)   • sertraline (ZOLOFT) 100 mg tablet Take 100 mg by mouth daily     No current facility-administered medications on file prior to visit  He is allergic to imipramine, lisinopril, paroxetine, penicillins, rosiglitazone, and troglitazone        Objective:    Blood pressure 100/60, pulse 77, height 5' 4\" (1 626 m), weight 82 1 kg (181 lb)  Physical Exam  Vitals and nursing note reviewed  Constitutional:       General: He is not in acute distress  Appearance: Normal appearance  He is normal weight  He is not ill-appearing, toxic-appearing or diaphoretic  HENT:      Head: Normocephalic and atraumatic     Eyes:      " General: Lids are normal       Extraocular Movements: Extraocular movements intact  Conjunctiva/sclera: Conjunctivae normal    Cardiovascular:      Rate and Rhythm: Normal rate  Pulmonary:      Effort: Pulmonary effort is normal  No respiratory distress  Abdominal:      General: Abdomen is flat  Musculoskeletal:         General: No swelling or tenderness  Normal range of motion  Cervical back: Normal range of motion and neck supple  Skin:     General: Skin is warm  Coloration: Skin is not jaundiced or pale  Neurological:      Mental Status: He is alert  Motor: Motor strength is normal       Coordination: Coordination is intact  Deep Tendon Reflexes: Reflexes are normal and symmetric  Psychiatric:         Attention and Perception: Attention normal          Mood and Affect: Affect is flat  Speech: Speech normal          Behavior: Behavior normal          Neurological Exam  Mental Status  Alert  Recent and remote memory are intact  Speech is normal  Language is fluent with no aphasia  Attention and concentration are normal  Fund of knowledge is appropriate for level of education  Cranial Nerves  CN II: Visual acuity is normal  Visual fields full to confrontation  CN III, IV, VI: Extraocular movements intact bilaterally  Normal lids and orbits bilaterally  Right pupil: Pinpoint  Left pupil: Pinpoint  CN V: Facial sensation is normal   CN VII: Full and symmetric facial movement  CN VIII: Hearing is normal   CN IX, X: Palate elevates symmetrically  Normal gag reflex  CN XI: Shoulder shrug strength is normal   CN XII: Tongue midline without atrophy or fasciculations  Motor  Normal muscle bulk throughout  No fasciculations present  Normal muscle tone  No abnormal involuntary movements  Strength is 5/5 throughout all four extremities  Sensory  Sensation is intact to light touch, pinprick, vibration and proprioception in all four extremities      Reflexes  Deep tendon reflexes are 2+ and symmetric in all four extremities  Coordination    Finger-to-nose, rapid alternating movements and heel-to-shin normal bilaterally without dysmetria  Gait  Normal casual, toe, heel and tandem gait  ROS:    Review of Systems   Constitutional: Negative  Negative for appetite change and fever  HENT: Negative  Negative for hearing loss, tinnitus, trouble swallowing and voice change  Eyes: Negative  Negative for photophobia, pain and visual disturbance  Respiratory: Negative  Negative for shortness of breath  Cardiovascular: Negative  Negative for palpitations  Gastrointestinal: Negative  Negative for nausea and vomiting  Endocrine: Negative  Negative for cold intolerance  Genitourinary: Positive for frequency and urgency  Negative for dysuria  Musculoskeletal: Negative  Negative for gait problem, myalgias and neck pain  Skin: Negative  Negative for rash  Allergic/Immunologic: Negative  Neurological: Negative  Negative for dizziness, tremors, seizures, syncope, facial asymmetry, speech difficulty, weakness, light-headedness, numbness and headaches  Hematological: Bruises/bleeds easily  Psychiatric/Behavioral: Negative  Negative for confusion, hallucinations and sleep disturbance

## 2023-03-30 NOTE — ASSESSMENT & PLAN NOTE
Flori Singh is a 65-year-old male presenting to the neurology outpatient office for stroke follow-up  Patient presents alongside daughter  Currently on Lipitor 10 mg, Eliquis 5 mg BID  Daughter reports patient had stroke back in October 2022 which was noted during one of his hospitalizations  States since he has been having daily spells described as unresponsiveness, blank staring, hand clenching, intermittent associated urinary incontinence  MRI of the brain without contrast: 11/28/2022  -No MR evidence of acute ischemia  -Volume loss/atrophy appears advanced for patient's age  -Old left lacunar infarct    CT head without contrast: 1/17/2023  -No acute intracranial abnormalities  -Stable bilateral maxillary sinus mucosal thickening with high density material on the righta 65-year-old male presenting to the neurology clinic for hospital follow-up     Lab Results   Component Value Date/Time    HGBA1C 7 4 (H) 01/24/2023 09:27 AM    HGBA1C 7 5 (H) 07/22/2022 07:54 PM     Lab Results   Component Value Date/Time     01/24/2023 09:27 AM     (H) 07/22/2022 07:54 PM     Plan:  • CTA Head/Neck  • Ambulatory 48-hour EEG monitoring  • Referral to epileptologist  • Secondary stroke prevention:  o AP/AC: Continue Eliquis 5 mg BID, Will add ASA 81 mg  o Continue statin  o Smoking: patient counseled on importance of not smoking  o Recommend following a low salt, mediterranean diet   o Recommend routine physical exercise as tolerated   o Will defer to primary care team for monitoring of cholesterol panel and blood sugar numbers with target LDL cholesterol of less than 70 and hemoglobin A1c less than 7%  • Lifestyle management: patient counseled on importance of maintaining a healthy diet and regular exercise   • Continue PT/OT/ST as needed

## 2023-03-31 PROBLEM — R68.89 SPELLS OF DECREASED ATTENTIVENESS: Status: ACTIVE | Noted: 2023-03-31

## 2023-04-28 ENCOUNTER — TELEMEDICINE (OUTPATIENT)
Dept: GASTROENTEROLOGY | Facility: CLINIC | Age: 68
End: 2023-04-28

## 2023-04-28 DIAGNOSIS — K86.1 CHRONIC PANCREATITIS, UNSPECIFIED PANCREATITIS TYPE (HCC): Primary | ICD-10-CM

## 2023-04-28 DIAGNOSIS — Z12.11 SCREEN FOR COLON CANCER: ICD-10-CM

## 2023-04-28 DIAGNOSIS — R63.4 UNINTENTIONAL WEIGHT LOSS: ICD-10-CM

## 2023-04-28 DIAGNOSIS — E46 PROTEIN-CALORIE MALNUTRITION, UNSPECIFIED SEVERITY (HCC): ICD-10-CM

## 2023-04-28 RX ORDER — BISACODYL 5 MG/1
10 TABLET, DELAYED RELEASE ORAL ONCE
Qty: 2 TABLET | Refills: 0 | Status: SHIPPED | OUTPATIENT
Start: 2023-04-28 | End: 2023-04-28

## 2023-04-28 NOTE — PROGRESS NOTES
Virtual Regular Visit    Verification of patient location:    Patient is located at Home in the following state in which I hold an active license [place]      Assessment/Plan:    Problem List Items Addressed This Visit        Other    Unintentional weight loss    Relevant Orders    EGD    Unspecified protein-calorie malnutrition ([facility] )    Relevant Orders    EGD   Other Visit Diagnoses     Chronic pancreatitis, unspecified pancreatitis type ([facility] )    -  Primary    Screen for colon cancer        Relevant Medications    polyethylene glycol (GOLYTELY) 4000 mL solution    bisacodyl (DULCOLAX) 5 mg EC tablet    Other Relevant Orders    Colonoscopy        Patient here for virtual visit to reschedule procedures and discuss chronic pancreatitis  Patient is not losing any further weight  He was scheduled for EGD and colonoscopy back in December on initial office visit  This was canceled due to patient unable to receive prep  We will plan for rescheduling procedure in the next month or 2  Continue low-fat diet  Small/frequent meals for chronic pancreatitis  Reason for visit is   Chief Complaint   Patient presents with   • Virtual Regular Visit        Encounter provider Gisella Valencia MD    Provider located at [address] [place] [id number] [phone]      Recent Visits  No visits were found meeting these conditions  Showing recent visits within past 7 days and meeting all other requirements  Today's Visits  Date Type Provider Dept   04/28/23 Telemedicine Gisella Valencia MD [address] [facility] today's visits and meeting all other requirements  Future Appointments  No visits were found meeting these conditions  Showing future appointments within next 150 days and meeting all other requirements       The patient was identified by name and date of birth   Carlos Cobb was informed that this is a telemedicine visit and that the visit is being conducted through the [employer] platform  He agrees to proceed     My office door was closed  No one else was in the room  He acknowledged consent and understanding of privacy and security of the video platform  The patient has agreed to participate and understands they can discontinue the visit at any time  Patient is aware this is a billable service  Subjective  Talon Collado is a 79 y o  male here for follow up visit  Seen in December for screening but procedure was canceled due to patient unable to obtain prep at the time  He is here to follow-up on his chronic pancreatitis and reschedule procedures  Currently not losing weight and weight is remained stable  He is able to tolerate diet  Not having a lot of pain or discomfort after eating  He does abide to low-fat diet  Does have good appetite  No blood in the stool  No epigastric discomfort or bloating at this time  HPI     Past Medical History:   Diagnosis Date   • Diabetes mellitus ([facility] )    • Ob[facility], morbid ([email or web address] ) 11/14/2022       No past surgical history on file      Current Outpatient Medications   Medication Sig Dispense Refill   • aspirin 81 mg chewable tablet Chew 1 tablet (81 mg total) daily 30 tablet 0   • atorvastatin (LIPITOR) 10 mg tablet Take 1 tablet by mouth daily     • calcitriol (ROCALTROL) 0 25 mcg capsule TAKE 1 CAPSULE BY MOUTH EVERY DAY 90 capsule 1   • Continuous Blood Gluc  (Dexcom G7 ) STEPHEN Use 1 each continuous 1 each 0   • Continuous Blood Gluc Sensor (Dexcom G7 Sensor) MISC Use 1 each every 7 days 3 each 0   • cyanocobalamin (VITAMIN B-12) 100 mcg tablet Take by mouth daily     • Eliquis 5 MG TAKE 1 TABLET BY MOUTH TWICE A DAY 60 tablet 1   • fludrocortisone (FLORINEF) 0 1 mg tablet Take 2 tablets (0 2 mg total) by mouth daily 60 tablet 5   • hydrocortisone (CORTEF) 5 mg tablet Take 20mg(4 tabs) on waking up and  15mg(3 tabs) in evening 180 tablet 1   • insulin glargine (Toujeo SoloStar) 300 units/mL CONCENTRATED U-300 injection pen (1-unit dial) Inject 20 Units under the skin daily at bedtime 15 mL 0   • insulin lispro (HumaLOG) 100 units/mL injection pen Use 6 units of breakfast, 7 units with lunch and 7 units with dinner +1 unit per 50 above 150 mg/dL; maximum daily dose 40 units 15 mL 2   • Insulin Pen Needle (BD Pen Needle Blessing 2nd Gen) 32G X 4 MM MISC For use with insulin pen  Pharmacy may dispense brand covered by insurance  100 each 0   • Insulin Pen Needle (BD Pen Needle Blessing 2nd Gen) 32G X 4 MM MISC For use with insulin pen  Pharmacy may dispense brand covered by insurance  100 each 3   • Lantus SoloStar 100 units/mL SOPN  (Patient not taking: Reported on 3/30/2023)     • midodrine (PROAMATINE) 10 MG tablet TAKE 1 AND 1/2 TABLETS (15 MG TOTAL) BY MOUTH 3 (THREE) TIMES A DAY BEFORE MEALS 405 tablet 4   • potassium chloride (Klor-Con) 10 mEq tablet Take 20 mEq by mouth 2 (two) times a day     • risperiDONE (RisperDAL) 1 mg tablet Take 1 tablet by mouth 2 (two) times a day     • sertraline (ZOLOFT) 100 mg tablet Take 100 mg by mouth daily     • sodium chloride 1 g tablet Take 1 tablet (1 g total) by mouth 3 (three) times a day 270 tablet 3   • sodium chloride, concentrated, 2 5 MEQ/ML Take 1 g by mouth Three times a day       No current facility-administered medications for this visit  Allergies   Allergen Reactions   • Imipramine Other (See Comments)   • Lisinopril Other (See Comments)   • Paroxetine Other (See Comments)   • Penicillins Hives   • Rosiglitazone Other (See Comments)   • Troglitazone Other (See Comments)       Review of Systems    Video Exam    REVIEW OF SYSTEMS:    CONSTITUTIONAL: Denies any fever, chills, rigors, and weight loss  HEENT: No earache or tinnitus  Denies hearing loss or visual disturbances  CARDIOVASCULAR: No chest pain or palpitations  RESPIRATORY: Denies any cough, hemoptysis, shortness of breath or dyspnea on exertion    GASTROINTESTINAL: As noted in the History of Present Illness  GENITOURINARY: No problems with urination  Denies any hematuria or dysuria  NEUROLOGIC: No dizziness or vertigo, denies headaches  MUSCULOSKELETAL: Denies any muscle or joint pain  SKIN: Denies skin rashes or itching  ENDOCRINE: Denies excessive thirst  Denies intolerance to heat or cold  PSYCHOSOCIAL: Denies depression or anxiety  Denies any recent memory loss  PHYSICAL EXAMINATION:  Appearance and vitals taken from home devices    General Appearance:   Alert, cooperative, no distress   HEENT:  Normocephalic, atraumatic, anicteric  Neck supple, symmetrical, trachea midline  Lungs:   Equal chest rise and unlabored breathing, normal effort, no coughing  Cardiovascular:   No visualized JVD  Abdomen:   No abdominal distension  Skin:   No jaundice, rashes, or lesions  Musculoskeletal:   Normal range of motion visualized  Psych:  Normal affect and normal insight  Neuro:  Alert and appropriate               Visit Time  Total Visit Duration: 15 min

## 2023-05-08 ENCOUNTER — TELEPHONE (OUTPATIENT)
Dept: HEMATOLOGY ONCOLOGY | Facility: CLINIC | Age: 68
End: 2023-05-08

## 2023-05-08 NOTE — TELEPHONE ENCOUNTER
Spoke with patient's daughter regarding labs prior to appointment, daughter needed to reschedule appointment due to having her own appointment on Thursday, rescheduled to 5/25 at [phone] PM  Shital Boyle is requesting a virtual appointment, I informed her that Andrew Richmond was not in office today and I will send her a message and get back to her as soon as I hear from Andrew Richmond, Shital Boyle verbalized understanding

## 2023-05-09 ENCOUNTER — TELEPHONE (OUTPATIENT)
Dept: HEMATOLOGY ONCOLOGY | Facility: CLINIC | Age: 68
End: 2023-05-09

## 2023-05-09 NOTE — TELEPHONE ENCOUNTER
Left message for patient's daughter Lexy Knapp regarding call yesterday, Sonia curious in knowing if appointment with [facility] could be virtual, left message stating that [facility] is okay with a virtual visit, left my direct teams number with any questions

## 2023-05-14 DIAGNOSIS — I21.A1 TYPE 2 MI (MYOCARDIAL INFARCTION) (HCC): ICD-10-CM

## 2023-05-16 RX ORDER — APIXABAN 5 MG/1
TABLET, FILM COATED ORAL
Qty: 60 TABLET | Refills: 1 | Status: SHIPPED | OUTPATIENT
Start: 2023-05-16

## 2023-05-22 ENCOUNTER — TELEPHONE (OUTPATIENT)
Dept: NEUROLOGY | Facility: CLINIC | Age: 68
End: 2023-05-22

## 2023-05-22 DIAGNOSIS — E27.40 ADRENAL INSUFFICIENCY (HCC): ICD-10-CM

## 2023-05-23 ENCOUNTER — TELEPHONE (OUTPATIENT)
Dept: ENDOCRINOLOGY | Facility: CLINIC | Age: 68
End: 2023-05-23

## 2023-05-23 RX ORDER — HYDROCORTISONE 5 MG/1
TABLET ORAL
Qty: 180 TABLET | Refills: 1 | Status: SHIPPED | OUTPATIENT
Start: 2023-05-23

## 2023-05-23 NOTE — TELEPHONE ENCOUNTER
Liset Gonzalez from [facility] and [facility] left v/m that she is sending over a fax for pt  Any questions, please call them       P#[phone]   Ref #[id number]  J#[phone]

## 2023-05-25 ENCOUNTER — TELEPHONE (OUTPATIENT)
Dept: HEMATOLOGY ONCOLOGY | Facility: CLINIC | Age: 68
End: 2023-05-25

## 2023-05-25 NOTE — TELEPHONE ENCOUNTER
Appointment Change  Cancel, Reschedule, Change to Virtual      Who are you speaking with? Child   If it is not the patient, are they listed on an active communication consent form? Yes   Which provider is the appointment scheduled with? [facility] Goldstein   When is the appointment scheduled? Please list date and time  at 3:40pm    At which location is the appointment scheduled to take place? [place]   Was the appointment rescheduled or changed from an in person visit to a virtual visit? If so, please list the details of the change  06/15 at 2:00pm    What is the reason for the appointment change? Patient has a  to go to       Patient's daughter would like to know whether this next appointment can be virtual    Was [facility] scheduled for this visit? No   Does [facility] need to be scheduled for the new visit (if applicable) No   Does the patient need an infusion appointment rescheduled? No   Does the patient have an infusion appointment scheduled? If so, when? No   Is the patient undergoing chemotherapy? No   Was the no-show policy reviewed for appointments being changed with less then 24 hours of notice?  Yes

## 2023-05-25 NOTE — TELEPHONE ENCOUNTER
Left message for patient informing the patient that Balaji Ace is okay with the next appointment being virtual, left hope line's number for patient to call back with any questions

## 2023-05-28 DIAGNOSIS — E27.40 ADRENAL INSUFFICIENCY (HCC): ICD-10-CM

## 2023-05-29 RX ORDER — FLUDROCORTISONE ACETATE 0.1 MG/1
TABLET ORAL
Qty: 60 TABLET | Refills: 2 | Status: SHIPPED | OUTPATIENT
Start: 2023-05-29

## 2023-05-30 NOTE — TELEPHONE ENCOUNTER
IMPRESSION:     1  Stable chronic bilateral anterior gangliocapsular infarcts  Mild chronic microangiopathy      2  Severe atherosclerotic narrowing of bilateral supraclinoid internal carotid arteries  Severe stenosis of proximal left intracranial vertebral artery      3  No hemodynamically significant stenosis or dissection of cervical carotid and vertebral arteries  Ulcerated plaque with less than 50% stenosis at the origin of left cervical ICA      4   Right greater than left maxillary sinusitis with calcification and hyperattenuating materials in the right maxillary sinus may indicate inspissated secretions versus fungal colonization      5  A 3 mm right upper lobe pulmonary nodule is stable from 11/30/2022  Based on current Fleischner Society 2017 Guidelines on incidental pulmonary nodule, because the patient is considered high risk for lung cancer, 12 month follow-up non-contrast   chest CT is recommended  Recommend 8 months follow-up low-dose chest CT to establish one-year stability  Given smoking history patient may be qualified for CT  lung screening program            This study was marked in [facility] for notification and follow-up      Patient of Dr Ana Rosa Stokes, last visit 3/30; please review and provide input/recommendation      TT sent to Dr Ana Rosa Stokes

## 2023-05-31 ENCOUNTER — APPOINTMENT (OUTPATIENT)
Dept: LAB | Facility: CLINIC | Age: 68
End: 2023-05-31
Payer: COMMERCIAL

## 2023-05-31 DIAGNOSIS — R76.8 ELEVATED SERUM IMMUNOGLOBULIN FREE LIGHT CHAINS: ICD-10-CM

## 2023-05-31 DIAGNOSIS — I95.9 HYPOTENSION: ICD-10-CM

## 2023-05-31 DIAGNOSIS — D63.8 ANEMIA OF CHRONIC DISEASE: ICD-10-CM

## 2023-05-31 DIAGNOSIS — D50.9 IRON DEFICIENCY ANEMIA, UNSPECIFIED IRON DEFICIENCY ANEMIA TYPE: ICD-10-CM

## 2023-05-31 DIAGNOSIS — E46 PROTEIN-CALORIE MALNUTRITION, UNSPECIFIED SEVERITY (HCC): ICD-10-CM

## 2023-05-31 LAB
ALBUMIN SERPL BCP-MCNC: 3.4 G/DL (ref 3.5–5)
ALP SERPL-CCNC: 92 U/L (ref 46–116)
ALT SERPL W P-5'-P-CCNC: 36 U/L (ref 12–78)
ANION GAP SERPL CALCULATED.3IONS-SCNC: 2 MMOL/L (ref 4–13)
AST SERPL W P-5'-P-CCNC: 19 U/L (ref 5–45)
BASOPHILS # BLD AUTO: 0.02 THOUSANDS/ÂΜL (ref 0–0.1)
BASOPHILS NFR BLD AUTO: 0 % (ref 0–1)
BILIRUB SERPL-MCNC: 0.59 MG/DL (ref 0.2–1)
BUN SERPL-MCNC: 22 MG/DL (ref 5–25)
CALCIUM ALBUM COR SERPL-MCNC: 9 MG/DL (ref 8.3–10.1)
CALCIUM SERPL-MCNC: 8.5 MG/DL (ref 8.3–10.1)
CHLORIDE SERPL-SCNC: 105 MMOL/L (ref 96–108)
CO2 SERPL-SCNC: 28 MMOL/L (ref 21–32)
CREAT SERPL-MCNC: 1.16 MG/DL (ref 0.6–1.3)
CREAT UR-MCNC: 127 MG/DL
CREAT UR-MCNC: 127 MG/DL
EOSINOPHIL # BLD AUTO: 0.06 THOUSAND/ÂΜL (ref 0–0.61)
EOSINOPHIL NFR BLD AUTO: 1 % (ref 0–6)
ERYTHROCYTE [DISTWIDTH] IN BLOOD BY AUTOMATED COUNT: 14.6 % (ref 11.6–15.1)
FERRITIN SERPL-MCNC: 12 NG/ML (ref 24–336)
GFR SERPL CREATININE-BSD FRML MDRD: 65 ML/MIN/1.73SQ M
GLUCOSE P FAST SERPL-MCNC: 263 MG/DL (ref 65–99)
HCT VFR BLD AUTO: 34.7 % (ref 36.5–49.3)
HGB BLD-MCNC: 10.5 G/DL (ref 12–17)
IGA SERPL-MCNC: 340 MG/DL (ref 70–400)
IGG SERPL-MCNC: 831 MG/DL (ref 700–1600)
IGM SERPL-MCNC: 130 MG/DL (ref 40–230)
IMM GRANULOCYTES # BLD AUTO: 0.03 THOUSAND/UL (ref 0–0.2)
IMM GRANULOCYTES NFR BLD AUTO: 0 % (ref 0–2)
IRON SATN MFR SERPL: 13 % (ref 20–50)
IRON SERPL-MCNC: 48 UG/DL (ref 65–175)
LYMPHOCYTES # BLD AUTO: 2.28 THOUSANDS/ÂΜL (ref 0.6–4.47)
LYMPHOCYTES NFR BLD AUTO: 28 % (ref 14–44)
MCH RBC QN AUTO: 28.3 PG (ref 26.8–34.3)
MCHC RBC AUTO-ENTMCNC: 30.3 G/DL (ref 31.4–37.4)
MCV RBC AUTO: 94 FL (ref 82–98)
MICROALBUMIN UR-MCNC: 6.6 MG/L (ref 0–20)
MICROALBUMIN/CREAT 24H UR: 5 MG/G CREATININE (ref 0–30)
MONOCYTES # BLD AUTO: 0.59 THOUSAND/ÂΜL (ref 0.17–1.22)
MONOCYTES NFR BLD AUTO: 7 % (ref 4–12)
NEUTROPHILS # BLD AUTO: 5.16 THOUSANDS/ÂΜL (ref 1.85–7.62)
NEUTS SEG NFR BLD AUTO: 64 % (ref 43–75)
NRBC BLD AUTO-RTO: 0 /100 WBCS
PHOSPHATE SERPL-MCNC: 3.1 MG/DL (ref 2.3–4.1)
PLATELET # BLD AUTO: 141 THOUSANDS/UL (ref 149–390)
PMV BLD AUTO: 12.1 FL (ref 8.9–12.7)
POTASSIUM SERPL-SCNC: 3.6 MMOL/L (ref 3.5–5.3)
PROT SERPL-MCNC: 6.5 G/DL (ref 6.4–8.4)
PROT UR-MCNC: 23 MG/DL
PROT/CREAT UR: 0.18 MG/G{CREAT} (ref 0–0.1)
RBC # BLD AUTO: 3.71 MILLION/UL (ref 3.88–5.62)
SODIUM SERPL-SCNC: 135 MMOL/L (ref 135–147)
TIBC SERPL-MCNC: 375 UG/DL (ref 250–450)
WBC # BLD AUTO: 8.14 THOUSAND/UL (ref 4.31–10.16)

## 2023-05-31 PROCEDURE — 82728 ASSAY OF FERRITIN: CPT

## 2023-05-31 PROCEDURE — 82570 ASSAY OF URINE CREATININE: CPT

## 2023-05-31 PROCEDURE — 80053 COMPREHEN METABOLIC PANEL: CPT

## 2023-05-31 PROCEDURE — 83521 IG LIGHT CHAINS FREE EACH: CPT

## 2023-05-31 PROCEDURE — 84100 ASSAY OF PHOSPHORUS: CPT

## 2023-05-31 PROCEDURE — 36415 COLL VENOUS BLD VENIPUNCTURE: CPT

## 2023-05-31 PROCEDURE — 85025 COMPLETE CBC W/AUTO DIFF WBC: CPT

## 2023-05-31 PROCEDURE — 84165 PROTEIN E-PHORESIS SERUM: CPT

## 2023-05-31 PROCEDURE — 84156 ASSAY OF PROTEIN URINE: CPT

## 2023-05-31 PROCEDURE — 83550 IRON BINDING TEST: CPT

## 2023-05-31 PROCEDURE — 82784 ASSAY IGA/IGD/IGG/IGM EACH: CPT

## 2023-05-31 PROCEDURE — 82043 UR ALBUMIN QUANTITATIVE: CPT

## 2023-05-31 PROCEDURE — 83540 ASSAY OF IRON: CPT

## 2023-05-31 NOTE — TELEPHONE ENCOUNTER
Please call and let patient know that CTA head shows some narrowing in various arteries, but only medical management is recommended for this  C/w current plan, no changes  She also has a lung nodule that needs to be followed but will defer to PCP regarding this

## 2023-06-01 ENCOUNTER — TELEMEDICINE (OUTPATIENT)
Dept: NEPHROLOGY | Facility: CLINIC | Age: 68
End: 2023-06-01

## 2023-06-01 VITALS
HEART RATE: 78 BPM | WEIGHT: 183.6 LBS | HEIGHT: 64 IN | DIASTOLIC BLOOD PRESSURE: 62 MMHG | BODY MASS INDEX: 31.34 KG/M2 | SYSTOLIC BLOOD PRESSURE: 110 MMHG

## 2023-06-01 DIAGNOSIS — I95.1 ORTHOSTATIC HYPOTENSION: Primary | ICD-10-CM

## 2023-06-01 DIAGNOSIS — D50.9 IRON DEFICIENCY ANEMIA, UNSPECIFIED: ICD-10-CM

## 2023-06-01 LAB
KAPPA LC FREE SER-MCNC: 50 MG/L (ref 3.3–19.4)
KAPPA LC FREE/LAMBDA FREE SER: 2.24 {RATIO} (ref 0.26–1.65)
LAMBDA LC FREE SERPL-MCNC: 22.3 MG/L (ref 5.7–26.3)

## 2023-06-01 RX ORDER — FERROUS SULFATE TAB EC 324 MG (65 MG FE EQUIVALENT) 324 (65 FE) MG
324 TABLET DELAYED RESPONSE ORAL
Qty: 30 TABLET | Refills: 5 | Status: SHIPPED | OUTPATIENT
Start: 2023-06-01

## 2023-06-01 NOTE — TELEPHONE ENCOUNTER
Reached vm; left message to please cb and leave name (to see if on consent) and best number and time to return call

## 2023-06-01 NOTE — PROGRESS NOTES
Virtual Regular Visit    Verification of patient location:  Patient is located at Home in the following state in which I hold an active license [place]    Assessment/Plan:  Orthostatic hypotension  -Suspect secondary to autonomic dysfunction in the setting of type 1 diabetes versus other etiologies  -Patient was feeling better up until last week when he started feeling more dizzy, also had episode of fall but denies any syncopal episode   -Currently remains on midodrine 15 mg p o  3 times daily, Florinef 0 2 mg daily, hydrocortisone 20 mg in a m /15 mg in p m    -Due to recent episode of dizziness, advised to increase salt tablet from 1 g to 2 g p o  3 times daily for next few weeks  If his symptoms are overall better, he can eventually reduce back to current dose of 1 g p o  3 times daily   -Noted discussion regarding consideration of Northera as per cardiology/neurology  -Use compression stockings, abdominal binders daily  -Discussed slowly standing up to avoid symptomatic episodes   -His home BP readings were 120s/50s up until last week when he started feeling off-and-on dizzy with BP 90s SBP    -Continue to monitor BP closely at home   -Advised to call back if his symptoms are not improving despite increasing salt tablet      Renal function overall stable with baseline serum creatinine 0 9-1 2  -mild CKD component could be due to long-term type 1 diabetes  -Last creatinine 1 1 overall stable  -UA in January 2023 shows no significant hematuria, no proteinuria  -UACR nonsignificant, UPC ratio 180 mg in May 2023      Elevated free light chain ratio in the setting of anemia  -FLC ratio 2 0, SPEP, UPEP negative  -Further evaluation as per hematology, has upcoming appointment later this month    Iron deficiency anemia, iron saturation 13% in May 2023, hemoglobin 10 5   -We will start ferrous sulfate 1 tablet daily  Also has upcoming hematology appointment      Problem List Items Addressed This Visit Cardiovascular and Mediastinum    Orthostatic hypotension - Primary    Relevant Orders    Basic metabolic panel    CBC    Iron Saturation %    Ferritin    Albumin / creatinine urine ratio       Other    Iron deficiency anemia, unspecified    Relevant Medications    ferrous sulfate 324 (65 Fe) mg    Other Relevant Orders    CBC    Iron Saturation %    Ferritin       Reason for visit is   Chief Complaint   Patient presents with   • Virtual Regular Visit        Encounter provider Keo Roa MD    Provider located at [address] [place] [id number] [phone]      Recent Visits  No visits were found meeting these conditions  Showing recent visits within past 7 days and meeting all other requirements  Today's Visits  Date Type Provider Dept   06/01/23 Telemedicine Keo Roa MD [address] today's visits and meeting all other requirements  Future Appointments  No visits were found meeting these conditions  Showing future appointments within next 150 days and meeting all other requirements       The patient was identified by name and date of birth  Kylie Jennifer was informed that this is a telemedicine visit and that the visit is being conducted through the [employer]  He agrees to proceed     My office door was closed  No one else was in the room  He acknowledged consent and understanding of privacy and security of the video platform  The patient has agreed to participate and understands they can discontinue the visit at any time  Patient is aware this is a billable service  Subjective  Patient is 82-year-old male with significant medical issues of type 1 diabetes diagnosed since age of 42, A  fib, adrenal insufficiency, CVA, orthostatic hypotension, has telemedicine follow-up for hypotension     Since last visit patient was overall feeling well with home BP readings generally 120s/50s as per patient and his daughter who presents during telemedicine visit  Although over last 1 week his blood pressure has been staying low over SBP 90s and patient has been having off-and-on dizziness, also had episode of fall  Denies any syncope  Also closely follows with cardiology, neurology  He has been undergoing evaluation for seizures and being scheduled for EEG  Patient currently denies any nausea, vomiting, chest pain or shortness of breath  Denies any urinary complaint      He claims to be compliant with using compression stockings  Past Medical History:   Diagnosis Date   • Diabetes mellitus ([email or web address] 75 )    • Obesity, morbid ([facility] [[email or web address] 75 ) 11/14/2022       History reviewed  No pertinent surgical history  Current Outpatient Medications   Medication Sig Dispense Refill   • aspirin 81 mg chewable tablet Chew 1 tablet (81 mg total) daily 30 tablet 0   • atorvastatin (LIPITOR) 10 mg tablet Take 1 tablet by mouth daily     • calcitriol (ROCALTROL) 0 25 mcg capsule TAKE 1 CAPSULE BY MOUTH EVERY DAY 90 capsule 1   • Continuous Blood Gluc  (Dexcom G7 ) STEPHEN Use 1 each continuous 1 each 0   • Continuous Blood Gluc Sensor (Dexcom G7 Sensor) MISC Use 1 each every 7 days 3 each 0   • cyanocobalamin (VITAMIN B-12) 100 mcg tablet Take by mouth daily     • Eliquis 5 MG TAKE 1 TABLET BY MOUTH TWICE A DAY 60 tablet 1   • ferrous sulfate 324 (65 Fe) mg Take 1 tablet (324 mg total) by mouth daily before breakfast 30 tablet 5   • fludrocortisone (FLORINEF) 0 1 mg tablet TAKE 2 TABLETS BY MOUTH DAILY   60 tablet 2   • hydrocortisone (CORTEF) 5 mg tablet TAKE 20MG(4 TABS) ON WAKING UP AND 15MG(3 TABS) IN EVENING 180 tablet 1   • hydrocortisone (CORTEF) 5 mg tablet Take 20mg(4 tabs) on waking up and  15mg(3 tabs) in evening 180 tablet 1   • insulin glargine (Toujeo SoloStar) 300 units/mL CONCENTRATED U-300 injection pen (1-unit dial) Inject 20 Units under the skin daily at bedtime 15 mL 0   • insulin lispro (HumaLOG) 100 units/mL injection pen Use 6 units of breakfast, 7 units with lunch and 7 units with dinner +1 unit per 50 above 150 mg/dL; maximum daily dose 40 units 15 mL 2   • Insulin Pen Needle (BD Pen Needle Blessing 2nd Gen) 32G X 4 MM MISC For use with insulin pen  Pharmacy may dispense brand covered by insurance  100 each 0   • Insulin Pen Needle (BD Pen Needle Blessing 2nd Gen) 32G X 4 MM MISC For use with insulin pen  Pharmacy may dispense brand covered by insurance  100 each 3   • midodrine (PROAMATINE) 10 MG tablet TAKE 1 AND 1/2 TABLETS (15 MG TOTAL) BY MOUTH 3 (THREE) TIMES A DAY BEFORE MEALS 405 tablet 4   • polyethylene glycol (GOLYTELY) 4000 mL solution Take 4,000 mL by mouth once for 1 dose 4000 mL 0   • potassium chloride (Klor-Con) 10 mEq tablet Take 20 mEq by mouth 2 (two) times a day     • risperiDONE (RisperDAL) 1 mg tablet Take 1 tablet by mouth 2 (two) times a day     • sertraline (ZOLOFT) 100 mg tablet Take 100 mg by mouth daily     • sodium chloride 1 g tablet Take 1 tablet (1 g total) by mouth 3 (three) times a day 270 tablet 3   • sodium chloride, concentrated, 2 5 MEQ/ML Take 1 g by mouth Three times a day     • bisacodyl (DULCOLAX) 5 mg EC tablet Take 2 tablets (10 mg total) by mouth once for 1 dose 2 tablet 0   • Lantus SoloStar 100 units/mL SOPN  (Patient not taking: Reported on 3/30/2023)       No current facility-administered medications for this visit  Allergies   Allergen Reactions   • Imipramine Other (See Comments)   • Lisinopril Other (See Comments)   • Paroxetine Other (See Comments)   • Penicillins Hives   • Rosiglitazone Other (See Comments)   • Troglitazone Other (See Comments)     Review of system:  More than 10 review of system were obtained and no other pertinent positive findings other than mentioned in the note      Video Exam    Vitals:    06/01/23 0925   BP: 110/62   BP Location: Left arm   Patient Position: Sitting   Cuff Size: Standard   Pulse: 78   Weight: 83 3 kg (183 lb 9 6 "oz)   Height: 5' 4\" (1 626 m)       Physical Exam  Constitutional:       Appearance: He is well-developed  HENT:      Head: Atraumatic  Right Ear: External ear normal       Left Ear: External ear normal       Nose:      Comments: External examination of nose unremarkable  Cardiovascular:      Comments: No significant edema in legs  Pulmonary:      Effort: No respiratory distress  Comments: No conversational dyspnea noted  Abdominal:      General: There is no distension  Musculoskeletal:         General: No deformity  Skin:     Findings: No rash  Neurological:      Mental Status: He is alert and oriented to person, place, and time     Psychiatric:         Behavior: Behavior normal           Visit Time  Total Visit Duration: 25      "

## 2023-06-02 LAB
ALBUMIN SERPL ELPH-MCNC: 3.8 G/DL (ref 3.5–5)
ALBUMIN SERPL ELPH-MCNC: 59.4 % (ref 52–65)
ALPHA1 GLOB SERPL ELPH-MCNC: 0.26 G/DL (ref 0.1–0.4)
ALPHA1 GLOB SERPL ELPH-MCNC: 4 % (ref 2.5–5)
ALPHA2 GLOB SERPL ELPH-MCNC: 0.61 G/DL (ref 0.4–1.2)
ALPHA2 GLOB SERPL ELPH-MCNC: 9.6 % (ref 7–13)
BETA GLOB ABNORMAL SERPL ELPH-MCNC: 0.47 G/DL (ref 0.4–0.8)
BETA1 GLOB SERPL ELPH-MCNC: 7.4 % (ref 5–13)
BETA2 GLOB SERPL ELPH-MCNC: 4.7 % (ref 2–8)
BETA2+GAMMA GLOB SERPL ELPH-MCNC: 0.3 G/DL (ref 0.2–0.5)
GAMMA GLOB ABNORMAL SERPL ELPH-MCNC: 0.95 G/DL (ref 0.5–1.6)
GAMMA GLOB SERPL ELPH-MCNC: 14.9 % (ref 12–22)
IGG/ALB SER: 1.46 {RATIO} (ref 1.1–1.8)
PROT PATTERN SERPL ELPH-IMP: NORMAL
PROT SERPL-MCNC: 6.4 G/DL (ref 6.4–8.2)

## 2023-06-02 PROCEDURE — 84165 PROTEIN E-PHORESIS SERUM: CPT | Performed by: STUDENT IN AN ORGANIZED HEALTH CARE EDUCATION/TRAINING PROGRAM

## 2023-06-02 NOTE — TELEPHONE ENCOUNTER
Received vm from 6/1 at 3:45pI hi, I received a message regarding my father April Lion,  So I was just returning the call  I am like in and out of calls from my job  So um 4:30-4:45 would be a good time to reach me  [phone]   Thank you, morgan

## 2023-06-08 ENCOUNTER — TELEPHONE (OUTPATIENT)
Dept: HEMATOLOGY ONCOLOGY | Facility: CLINIC | Age: 68
End: 2023-06-08

## 2023-06-14 ENCOUNTER — TELEPHONE (OUTPATIENT)
Dept: HEMATOLOGY ONCOLOGY | Facility: CLINIC | Age: 68
End: 2023-06-14

## 2023-06-14 NOTE — TELEPHONE ENCOUNTER
Appointment Change  Cancel, Reschedule, Change to Virtual      Who are you speaking with? Child   If it is not the patient, are they listed on an active communication consent form? Yes   Which provider is the appointment scheduled with? NARA Mahmood   When is the appointment scheduled? Please list date and time  6/15/23 2pm   At which location is the appointment scheduled to take place? Virtual   Was the appointment rescheduled or changed from an in person visit to a virtual visit? If so, please list the details of the change  7/5/23 440   What is the reason for the appointment change? Pt's daughter has a rheumatology appt she cannot miss   Was [facility] transport scheduled for this visit? N/A   Does [facility] transport need to be scheduled for the new visit (if applicable) N/A   Does the patient need an infusion appointment rescheduled? N/A   Does the patient have an infusion appointment scheduled? If so, when? No   Is the patient undergoing chemotherapy? N/A   Was the no-show policy reviewed for appointments being changed with less then 24 hours of notice?  Yes

## 2023-06-15 ENCOUNTER — TELEPHONE (OUTPATIENT)
Dept: HEMATOLOGY ONCOLOGY | Facility: CLINIC | Age: 68
End: 2023-06-15

## 2023-06-15 NOTE — TELEPHONE ENCOUNTER
Called phone listed to have virtual appointment went straight to voicemail  Left message to either send message through [employer] when they are available or call back through the HOPE line to reschedule

## 2023-06-16 ENCOUNTER — TELEPHONE (OUTPATIENT)
Dept: NEUROLOGY | Facility: CLINIC | Age: 68
End: 2023-06-16

## 2023-06-16 ENCOUNTER — TELEPHONE (OUTPATIENT)
Dept: ENDOCRINOLOGY | Facility: CLINIC | Age: 68
End: 2023-06-16

## 2023-06-16 DIAGNOSIS — E10.65 TYPE 1 DIABETES MELLITUS WITH HYPERGLYCEMIA (HCC): ICD-10-CM

## 2023-06-16 DIAGNOSIS — E10.65 TYPE 1 DIABETES MELLITUS WITH HYPERGLYCEMIA (HCC): Primary | ICD-10-CM

## 2023-06-16 RX ORDER — PROCHLORPERAZINE 25 MG/1
1 SUPPOSITORY RECTAL CONTINUOUS
Qty: 1 EACH | Refills: 0 | Status: SHIPPED | OUTPATIENT
Start: 2023-06-16 | End: 2023-06-16 | Stop reason: SDUPTHER

## 2023-06-16 RX ORDER — PROCHLORPERAZINE 25 MG/1
SUPPOSITORY RECTAL
Qty: 3 EACH | Refills: 0 | Status: SHIPPED | OUTPATIENT
Start: 2023-06-16 | End: 2023-06-16

## 2023-06-16 RX ORDER — PROCHLORPERAZINE 25 MG/1
SUPPOSITORY RECTAL
Qty: 3 EACH | Refills: 2 | Status: SHIPPED | OUTPATIENT
Start: 2023-06-16

## 2023-06-16 RX ORDER — PROCHLORPERAZINE 25 MG/1
SUPPOSITORY RECTAL
Qty: 1 EACH | Refills: 2 | Status: SHIPPED | OUTPATIENT
Start: 2023-06-16

## 2023-06-16 RX ORDER — PROCHLORPERAZINE 25 MG/1
SUPPOSITORY RECTAL
Qty: 1 EACH | Refills: 0 | Status: SHIPPED | OUTPATIENT
Start: 2023-06-16 | End: 2023-06-16 | Stop reason: SDUPTHER

## 2023-06-16 RX ORDER — PROCHLORPERAZINE 25 MG/1
SUPPOSITORY RECTAL
Qty: 3 EACH | Refills: 0 | Status: SHIPPED | OUTPATIENT
Start: 2023-06-16 | End: 2023-06-16 | Stop reason: SDUPTHER

## 2023-06-16 RX ORDER — PROCHLORPERAZINE 25 MG/1
SUPPOSITORY RECTAL
Qty: 1 EACH | Refills: 2 | Status: SHIPPED | OUTPATIENT
Start: 2023-06-16 | End: 2023-06-16 | Stop reason: SDUPTHER

## 2023-06-16 RX ORDER — PROCHLORPERAZINE 25 MG/1
SUPPOSITORY RECTAL
Qty: 3 EACH | Refills: 2 | Status: SHIPPED | OUTPATIENT
Start: 2023-06-16 | End: 2023-06-16 | Stop reason: SDUPTHER

## 2023-06-16 RX ORDER — PROCHLORPERAZINE 25 MG/1
1 SUPPOSITORY RECTAL CONTINUOUS
Qty: 1 EACH | Refills: 0 | Status: SHIPPED | OUTPATIENT
Start: 2023-06-16 | End: 2023-06-16

## 2023-06-16 RX ORDER — PROCHLORPERAZINE 25 MG/1
SUPPOSITORY RECTAL
Qty: 1 EACH | Refills: 0 | Status: SHIPPED | OUTPATIENT
Start: 2023-06-16 | End: 2023-06-16

## 2023-06-16 RX ORDER — PROCHLORPERAZINE 25 MG/1
1 SUPPOSITORY RECTAL CONTINUOUS
Qty: 1 EACH | Refills: 0 | Status: SHIPPED | OUTPATIENT
Start: 2023-06-16

## 2023-06-16 NOTE — TELEPHONE ENCOUNTER
Received VM transcription:    Hi, good morning  My name is Elaine [place]  I'm calling in reference to my father Raina Espinoza  I spoke with a gentleman this morning and I know that he was sending a note over  You can please disregard that  I do need to speak to somebody though regarding changing my fathers medical supplier  And I also need to discuss something else very important, as one of your staff members was giving false information to the medical supplier, which I need that to be handled as soon as possible  I really need to speak to somebody today as my father has no medical supplies, and I really need to get this taken care of immediately  You can reach me at [phone]  Thank you  Bye bye   -------------------------------------------------------    Chart reviewed  No indication that Dr Kim Phillips provides medical supplies  It seems Endocrinology supplies medical supplies  Spoke with pt's daughter and she apologizes and confirms that she called the wrong office  Says she will call endocrinology office  Nothing further at this time

## 2023-06-16 NOTE — TELEPHONE ENCOUNTER
Pt is requesting that the Dexcom G6 prescriptions be sent to [id number] at [facility] in [place]  She already spoke with them and they said they had it  Said the DME company does not take a portion of their insurance  Would like to be notified when it is sent

## 2023-06-16 NOTE — TELEPHONE ENCOUNTER
Patient's daughter calling because request for supplies for patient has been denied and she needs assistance  Please call

## 2023-06-20 NOTE — TELEPHONE ENCOUNTER
Pt's daughter said when she called the pharmacy Sat a m  they told her they still hadn't received prior authorization for the Dexcom G6  Daughter said [facility] told her the ins co is not approving the Dexcom G6, but the daughter said ins co told her they would cover it as long as the prior authorization was completed  ([facility] also said they didn't receive clinical notes, but daughter said she thought it was sent )     Daughter said this is very confusing and needs to speak with someone to straighten this out since last Thursday and pt has no supplies left

## 2023-06-21 NOTE — TELEPHONE ENCOUNTER
Spoke to [employer] yesterday and they stated that the PA was denied because it was submitted on Saturday and we only had 72 hours (not business hours/days) to respond to a message that was not received by the office  Representative faxed forms that can possibly reopen the case and have it processed due to the inconvenience  Forms and clinical notes were faxed

## 2023-06-22 NOTE — TELEPHONE ENCOUNTER
[employer] appeals dept LM stating that they need additional information in regards to other devices patient tried for the past two years  Would like a call back  [phone]  Did not leave a ref number  Called 1x to assist and was disconnected

## 2023-06-24 DIAGNOSIS — D50.9 IRON DEFICIENCY ANEMIA, UNSPECIFIED: ICD-10-CM

## 2023-06-25 ENCOUNTER — HOSPITAL ENCOUNTER (INPATIENT)
Facility: HOSPITAL | Age: 68
LOS: 2 days | Discharge: HOME HEALTH | End: 2023-06-27
Attending: EMERGENCY MEDICINE | Admitting: INTERNAL MEDICINE
Payer: COMMERCIAL

## 2023-06-25 ENCOUNTER — APPOINTMENT (EMERGENCY)
Dept: RADIOLOGY | Facility: HOSPITAL | Age: 68
End: 2023-06-25
Payer: COMMERCIAL

## 2023-06-25 DIAGNOSIS — E16.2 HYPOGLYCEMIA: ICD-10-CM

## 2023-06-25 DIAGNOSIS — E27.40 ADRENAL INSUFFICIENCY (HCC): Primary | ICD-10-CM

## 2023-06-25 DIAGNOSIS — E10.65 TYPE 1 DIABETES MELLITUS WITH HYPERGLYCEMIA (HCC): ICD-10-CM

## 2023-06-25 DIAGNOSIS — E10.9 INSULIN DEPENDENT TYPE 1 DIABETES MELLITUS (HCC): ICD-10-CM

## 2023-06-25 DIAGNOSIS — R42 LIGHTHEADEDNESS: ICD-10-CM

## 2023-06-25 DIAGNOSIS — I95.1 ORTHOSTATIC HYPOTENSION: ICD-10-CM

## 2023-06-25 DIAGNOSIS — R77.8 ELEVATED TROPONIN: ICD-10-CM

## 2023-06-25 PROBLEM — E10.649 TYPE 1 DIABETES MELLITUS WITH HYPOGLYCEMIA (HCC): Status: ACTIVE | Noted: 2022-10-19

## 2023-06-25 LAB
2HR DELTA HS TROPONIN: -23 NG/L
ALBUMIN SERPL BCP-MCNC: 3.2 G/DL (ref 3.5–5)
ALP SERPL-CCNC: 64 U/L (ref 46–116)
ALT SERPL W P-5'-P-CCNC: 19 U/L (ref 12–78)
ANION GAP SERPL CALCULATED.3IONS-SCNC: 0 MMOL/L
AST SERPL W P-5'-P-CCNC: 15 U/L (ref 5–45)
ATRIAL RATE: 85 BPM
BASOPHILS # BLD AUTO: 0.02 THOUSANDS/ÂΜL (ref 0–0.1)
BASOPHILS NFR BLD AUTO: 0 % (ref 0–1)
BILIRUB SERPL-MCNC: 0.37 MG/DL (ref 0.2–1)
BUN SERPL-MCNC: 12 MG/DL (ref 5–25)
CALCIUM ALBUM COR SERPL-MCNC: 9.1 MG/DL (ref 8.3–10.1)
CALCIUM SERPL-MCNC: 8.5 MG/DL (ref 8.3–10.1)
CARDIAC TROPONIN I PNL SERPL HS: 147 NG/L
CARDIAC TROPONIN I PNL SERPL HS: 170 NG/L
CHLORIDE SERPL-SCNC: 115 MMOL/L (ref 96–108)
CO2 SERPL-SCNC: 24 MMOL/L (ref 21–32)
CREAT SERPL-MCNC: 1.24 MG/DL (ref 0.6–1.3)
EOSINOPHIL # BLD AUTO: 0.03 THOUSAND/ÂΜL (ref 0–0.61)
EOSINOPHIL NFR BLD AUTO: 0 % (ref 0–6)
ERYTHROCYTE [DISTWIDTH] IN BLOOD BY AUTOMATED COUNT: 16.3 % (ref 11.6–15.1)
GFR SERPL CREATININE-BSD FRML MDRD: 60 ML/MIN/1.73SQ M
GLUCOSE SERPL-MCNC: 103 MG/DL (ref 65–140)
GLUCOSE SERPL-MCNC: 138 MG/DL (ref 65–140)
GLUCOSE SERPL-MCNC: 96 MG/DL (ref 65–140)
HCT VFR BLD AUTO: 30.6 % (ref 36.5–49.3)
HGB BLD-MCNC: 9.8 G/DL (ref 12–17)
IMM GRANULOCYTES # BLD AUTO: 0.03 THOUSAND/UL (ref 0–0.2)
IMM GRANULOCYTES NFR BLD AUTO: 0 % (ref 0–2)
LYMPHOCYTES # BLD AUTO: 0.76 THOUSANDS/ÂΜL (ref 0.6–4.47)
LYMPHOCYTES NFR BLD AUTO: 10 % (ref 14–44)
MCH RBC QN AUTO: 29.1 PG (ref 26.8–34.3)
MCHC RBC AUTO-ENTMCNC: 32 G/DL (ref 31.4–37.4)
MCV RBC AUTO: 91 FL (ref 82–98)
MONOCYTES # BLD AUTO: 0.82 THOUSAND/ÂΜL (ref 0.17–1.22)
MONOCYTES NFR BLD AUTO: 11 % (ref 4–12)
NEUTROPHILS # BLD AUTO: 5.64 THOUSANDS/ÂΜL (ref 1.85–7.62)
NEUTS SEG NFR BLD AUTO: 79 % (ref 43–75)
NRBC BLD AUTO-RTO: 0 /100 WBCS
P AXIS: 56 DEGREES
PLATELET # BLD AUTO: 148 THOUSANDS/UL (ref 149–390)
PMV BLD AUTO: 12 FL (ref 8.9–12.7)
POTASSIUM SERPL-SCNC: 4.8 MMOL/L (ref 3.5–5.3)
PR INTERVAL: 216 MS
PROT SERPL-MCNC: 6.2 G/DL (ref 6.4–8.4)
QRS AXIS: 44 DEGREES
QRSD INTERVAL: 72 MS
QT INTERVAL: 344 MS
QTC INTERVAL: 409 MS
RBC # BLD AUTO: 3.37 MILLION/UL (ref 3.88–5.62)
SODIUM SERPL-SCNC: 139 MMOL/L (ref 135–147)
T WAVE AXIS: 242 DEGREES
TSH SERPL DL<=0.05 MIU/L-ACNC: 0.78 UIU/ML (ref 0.45–4.5)
VENTRICULAR RATE: 85 BPM
WBC # BLD AUTO: 7.3 THOUSAND/UL (ref 4.31–10.16)

## 2023-06-25 PROCEDURE — 93005 ELECTROCARDIOGRAM TRACING: CPT

## 2023-06-25 PROCEDURE — 99223 1ST HOSP IP/OBS HIGH 75: CPT | Performed by: INTERNAL MEDICINE

## 2023-06-25 PROCEDURE — 80053 COMPREHEN METABOLIC PANEL: CPT

## 2023-06-25 PROCEDURE — 99285 EMERGENCY DEPT VISIT HI MDM: CPT

## 2023-06-25 PROCEDURE — 36415 COLL VENOUS BLD VENIPUNCTURE: CPT

## 2023-06-25 PROCEDURE — 82948 REAGENT STRIP/BLOOD GLUCOSE: CPT

## 2023-06-25 PROCEDURE — 93010 ELECTROCARDIOGRAM REPORT: CPT | Performed by: INTERNAL MEDICINE

## 2023-06-25 PROCEDURE — 84484 ASSAY OF TROPONIN QUANT: CPT

## 2023-06-25 PROCEDURE — 84484 ASSAY OF TROPONIN QUANT: CPT | Performed by: INTERNAL MEDICINE

## 2023-06-25 PROCEDURE — 85025 COMPLETE CBC W/AUTO DIFF WBC: CPT

## 2023-06-25 PROCEDURE — 84443 ASSAY THYROID STIM HORMONE: CPT

## 2023-06-25 PROCEDURE — 96374 THER/PROPH/DIAG INJ IV PUSH: CPT

## 2023-06-25 PROCEDURE — 71045 X-RAY EXAM CHEST 1 VIEW: CPT

## 2023-06-25 RX ORDER — FLUDROCORTISONE ACETATE 0.1 MG/1
0.2 TABLET ORAL DAILY
Status: DISCONTINUED | OUTPATIENT
Start: 2023-06-26 | End: 2023-06-27 | Stop reason: HOSPADM

## 2023-06-25 RX ORDER — ASPIRIN 81 MG/1
162 TABLET, CHEWABLE ORAL ONCE
Status: COMPLETED | OUTPATIENT
Start: 2023-06-25 | End: 2023-06-25

## 2023-06-25 RX ORDER — MIDODRINE HYDROCHLORIDE 5 MG/1
15 TABLET ORAL
Status: DISCONTINUED | OUTPATIENT
Start: 2023-06-26 | End: 2023-06-27 | Stop reason: HOSPADM

## 2023-06-25 RX ORDER — INSULIN GLARGINE 100 [IU]/ML
10 INJECTION, SOLUTION SUBCUTANEOUS
Status: DISCONTINUED | OUTPATIENT
Start: 2023-06-25 | End: 2023-06-26

## 2023-06-25 RX ORDER — ONDANSETRON 2 MG/ML
4 INJECTION INTRAMUSCULAR; INTRAVENOUS EVERY 6 HOURS PRN
Status: DISCONTINUED | OUTPATIENT
Start: 2023-06-25 | End: 2023-06-27 | Stop reason: HOSPADM

## 2023-06-25 RX ORDER — CALCITRIOL 0.25 UG/1
0.25 CAPSULE, LIQUID FILLED ORAL DAILY
Status: DISCONTINUED | OUTPATIENT
Start: 2023-06-26 | End: 2023-06-27 | Stop reason: HOSPADM

## 2023-06-25 RX ORDER — ASPIRIN 81 MG/1
81 TABLET, CHEWABLE ORAL DAILY
Status: DISCONTINUED | OUTPATIENT
Start: 2023-06-26 | End: 2023-06-27 | Stop reason: HOSPADM

## 2023-06-25 RX ORDER — SERTRALINE HYDROCHLORIDE 100 MG/1
100 TABLET, FILM COATED ORAL DAILY
Status: DISCONTINUED | OUTPATIENT
Start: 2023-06-26 | End: 2023-06-27 | Stop reason: HOSPADM

## 2023-06-25 RX ORDER — RISPERIDONE 1 MG/1
1 TABLET ORAL 2 TIMES DAILY
Status: DISCONTINUED | OUTPATIENT
Start: 2023-06-25 | End: 2023-06-27 | Stop reason: HOSPADM

## 2023-06-25 RX ORDER — HYDROCORTISONE 20 MG/1
20 TABLET ORAL DAILY
Status: DISCONTINUED | OUTPATIENT
Start: 2023-06-26 | End: 2023-06-26

## 2023-06-25 RX ORDER — SODIUM CHLORIDE 1 G/1
1 TABLET ORAL 3 TIMES DAILY
Status: DISCONTINUED | OUTPATIENT
Start: 2023-06-25 | End: 2023-06-27 | Stop reason: HOSPADM

## 2023-06-25 RX ORDER — INSULIN LISPRO 100 [IU]/ML
1-5 INJECTION, SOLUTION INTRAVENOUS; SUBCUTANEOUS
Status: DISCONTINUED | OUTPATIENT
Start: 2023-06-25 | End: 2023-06-27 | Stop reason: HOSPADM

## 2023-06-25 RX ORDER — FERROUS SULFATE 325(65) MG
325 TABLET ORAL
Status: DISCONTINUED | OUTPATIENT
Start: 2023-06-26 | End: 2023-06-27 | Stop reason: HOSPADM

## 2023-06-25 RX ORDER — POTASSIUM CHLORIDE 750 MG/1
20 TABLET, EXTENDED RELEASE ORAL 2 TIMES DAILY
Status: DISCONTINUED | OUTPATIENT
Start: 2023-06-25 | End: 2023-06-27 | Stop reason: HOSPADM

## 2023-06-25 RX ORDER — ACETAMINOPHEN 325 MG/1
650 TABLET ORAL EVERY 6 HOURS PRN
Status: DISCONTINUED | OUTPATIENT
Start: 2023-06-25 | End: 2023-06-27 | Stop reason: HOSPADM

## 2023-06-25 RX ORDER — ATORVASTATIN CALCIUM 10 MG/1
10 TABLET, FILM COATED ORAL DAILY
Status: DISCONTINUED | OUTPATIENT
Start: 2023-06-26 | End: 2023-06-27 | Stop reason: HOSPADM

## 2023-06-25 RX ORDER — INSULIN LISPRO 100 [IU]/ML
1-6 INJECTION, SOLUTION INTRAVENOUS; SUBCUTANEOUS
Status: DISCONTINUED | OUTPATIENT
Start: 2023-06-26 | End: 2023-06-27 | Stop reason: HOSPADM

## 2023-06-25 RX ADMIN — SODIUM CHLORIDE 1 G: 1 TABLET ORAL at 22:21

## 2023-06-25 RX ADMIN — RISPERIDONE 1 MG: 1 TABLET ORAL at 22:21

## 2023-06-25 RX ADMIN — APIXABAN 5 MG: 5 TABLET, FILM COATED ORAL at 22:21

## 2023-06-25 RX ADMIN — ACETAMINOPHEN 650 MG: 325 TABLET ORAL at 22:21

## 2023-06-25 RX ADMIN — INSULIN GLARGINE 10 UNITS: 100 INJECTION, SOLUTION SUBCUTANEOUS at 22:21

## 2023-06-25 RX ADMIN — POTASSIUM CHLORIDE 20 MEQ: 750 TABLET, EXTENDED RELEASE ORAL at 22:21

## 2023-06-25 RX ADMIN — ASPIRIN 81 MG CHEWABLE TABLET 162 MG: 81 TABLET CHEWABLE at 21:10

## 2023-06-25 RX ADMIN — HYDROCORTISONE SODIUM SUCCINATE 100 MG: 100 INJECTION, POWDER, FOR SOLUTION INTRAMUSCULAR; INTRAVENOUS at 20:45

## 2023-06-25 NOTE — ED ATTENDING ATTESTATION
6/25/2023  I, Buddy Jackson MD, saw and evaluated the patient  I have discussed the patient with the resident/non-physician practitioner and agree with the resident's/non-physician practitioner's findings, Plan of Care, and MDM as documented in the resident's/non-physician practitioner's note, except where noted  All available labs and Radiology studies were reviewed  I was present for key portions of any procedure(s) performed by the resident/non-physician practitioner and I was immediately available to provide assistance  At this point I agree with the current assessment done in the Emergency Department  I have conducted an independent evaluation of this patient a history and physical is as follows:    Chief Complaint   Patient presents with   • Hypoglycemia - Symptomatic     Per pt and daughter, pt has had fluctuation blood sugars x 24h, at home bg read 113, currently 96  Last night pt got as low as in the 40s, but refused to come to the er when ems arrived to their house  Pt aao x3 at this time, states he has a headache  DM, adrenal insufficiency, presenting with fatigue generalized weakness    BG was in 30-40s two days ago, received glucagon, refused transport at that time  Feels light-headed and weakness  Had a syncopal episode      Blood sugar  Anemia  Endocrine  Infection      ED Course         Critical Care Time  ECG 12 Lead Documentation Only    Date/Time: 6/25/2023 7:20 PM    Performed by: Buddy Jackson MD  Authorized by: Buddy Jackson MD

## 2023-06-25 NOTE — ED PROVIDER NOTES
History  Chief Complaint   Patient presents with   • Hypoglycemia - Symptomatic     Per pt and daughter, pt has had fluctuation blood sugars x 24h, at home bg read 113, currently 96  Last night pt got as low as in the 40s, but refused to come to the er when ems arrived to their house  Pt aao x3 at this time, states he has a headache       82-year-old male patient with history of diabetes, adrenal insufficiency, stroke presenting with lightheadedness and weakness onset 2 days ago  Per wife, patient has been fluctuating in blood sugar since 2 days ago  Patient 2 days ago had a low blood sugar of 40s  Patient was confused and lightheadedness and was given glucagon  Patient was given another dose of glucagon by EMS  Patient blood sugar came up and patient was alert and oriented and refused to come to the ED  Patient throughout the past 2 days had episodes of syncope and sugar in the 40s including last night  Patient has been taking his insulin as normal and no changes recently  Patient also has been eating and drinking normally  Denies any chest pain, shortness of breath, abdominal pain, nausea, vomiting, diarrhea, blood in stool  Prior to Admission Medications   Prescriptions Last Dose Informant Patient Reported? Taking? BD Pen Needle Blessing 2nd Gen 32G X 4 MM MISC   No No   Sig: FOR USE WITH INSULIN PEN  PHARMACY MAY DISPENSE BRAND COVERED BY INSURANCE  Continuous Blood Gluc  (Dexcom G6 ) STEPHEN   No No   Sig: Use 1 Units continuous   Continuous Blood Gluc Sensor (Dexcom G6 Sensor) MISC   No No   Sig: Use 1 each every 10 days   Continuous Blood Gluc Transmit (Dexcom G6 Transmitter) MISC   No No   Si each every 3 months   Eliquis 5 MG   No No   Sig: TAKE 1 TABLET BY MOUTH TWICE A DAY   Insulin Pen Needle (BD Pen Needle Blessing 2nd Gen) 32G X 4 MM MISC  Child No No   Sig: For use with insulin pen  Pharmacy may dispense brand covered by insurance     Lantus SoloStar 100 units/mL SOPN Child Yes No   Patient not taking: Reported on 3/30/2023   aspirin 81 mg chewable tablet   No No   Sig: Chew 1 tablet (81 mg total) daily   atorvastatin (LIPITOR) 10 mg tablet  Child Yes No   Sig: Take 1 tablet by mouth daily   bisacodyl (DULCOLAX) 5 mg EC tablet   No No   Sig: Take 2 tablets (10 mg total) by mouth once for 1 dose   calcitriol (ROCALTROL) 0 25 mcg capsule   No No   Sig: TAKE 1 CAPSULE BY MOUTH EVERY DAY   cyanocobalamin (VITAMIN B-12) 100 mcg tablet  Child Yes No   Sig: Take by mouth daily   ferrous sulfate 324 (65 Fe) mg   No No   Sig: Take 1 tablet (324 mg total) by mouth daily before breakfast   fludrocortisone (FLORINEF) 0 1 mg tablet   No No   Sig: TAKE 2 TABLETS BY MOUTH DAILY     hydrocortisone (CORTEF) 5 mg tablet   No No   Sig: TAKE 20MG(4 TABS) ON WAKING UP AND 15MG(3 TABS) IN EVENING   hydrocortisone (CORTEF) 5 mg tablet   No No   Sig: Take 20mg(4 tabs) on waking up and  15mg(3 tabs) in evening   insulin glargine (Toujeo SoloStar) 300 units/mL CONCENTRATED U-300 injection pen (1-unit dial)   No No   Sig: Inject 20 Units under the skin daily at bedtime   insulin lispro (HumaLOG) 100 units/mL injection pen   No No   Sig: Use 6 units of breakfast, 7 units with lunch and 7 units with dinner +1 unit per 50 above 150 mg/dL; maximum daily dose 40 units   midodrine (PROAMATINE) 10 MG tablet   No No   Sig: TAKE 1 AND 1/2 TABLETS (15 MG TOTAL) BY MOUTH 3 (THREE) TIMES A DAY BEFORE MEALS   polyethylene glycol (GOLYTELY) 4000 mL solution   No No   Sig: Take 4,000 mL by mouth once for 1 dose   potassium chloride (Klor-Con) 10 mEq tablet  Child Yes No   Sig: Take 20 mEq by mouth 2 (two) times a day   risperiDONE (RisperDAL) 1 mg tablet  Child Yes No   Sig: Take 1 tablet by mouth 2 (two) times a day   sertraline (ZOLOFT) 100 mg tablet  Child Yes No   Sig: Take 100 mg by mouth daily   sodium chloride 1 g tablet  Child No No   Sig: Take 1 tablet (1 g total) by mouth 3 (three) times a day   sodium chloride, concentrated, 2 5 MEQ/ML   Yes No   Sig: Take 1 g by mouth Three times a day      Facility-Administered Medications: None       Past Medical History:   Diagnosis Date   • Diabetes mellitus ([facility] )    • Obesity, morbid ([facility] ) 11/14/2022       History reviewed  No pertinent surgical history  Family History   Problem Relation Age of Onset   • Heart disease Mother    • Cancer Father    • Multiple sclerosis Sister      I have reviewed and agree with the history as documented  E-Cigarette/Vaping   • E-Cigarette Use Never User      E-Cigarette/Vaping Substances     Social History     Tobacco Use   • Smoking status: Former     Packs/day: 0 25     Years: 30 00     Total pack years: 7 50     Types: Cigarettes   • Smokeless tobacco: Never   Vaping Use   • Vaping Use: Never used   Substance Use Topics   • Alcohol use: Not Currently     Alcohol/week: 5 0 standard drinks of alcohol     Types: 5 Cans of beer per week     Comment: Quit in august   • Drug use: Never        Review of Systems   Constitutional: Positive for fatigue  Neurological: Positive for weakness and light-headedness  All other systems reviewed and are negative  Physical Exam  ED Triage Vitals   Temperature Pulse Respirations Blood Pressure SpO2   06/25/23 1902 06/25/23 1902 06/25/23 1902 06/25/23 1902 06/25/23 1902   98 2 °F (36 8 °C) 84 18 113/56 96 %      Temp Source Heart Rate Source Patient Position - Orthostatic VS BP Location FiO2 (%)   06/25/23 1902 06/25/23 1902 06/25/23 2100 06/25/23 2100 --   Oral Monitor Lying Right arm       Pain Score       06/25/23 1902       6             Orthostatic Vital Signs  Vitals:    06/26/23 0809 06/26/23 0811 06/26/23 0811 06/26/23 1451   BP: 136/66 96/53 96/53 (!) 171/95   Pulse: 91 84 84 85   Patient Position - Orthostatic VS: Lying - Orthostatic VS Sitting - Orthostatic VS Standing - Orthostatic VS        Physical Exam  Vitals reviewed  Constitutional:       Appearance: He is ill-appearing     HENT: Head: Normocephalic and atraumatic  Nose: Nose normal       Mouth/Throat:      Mouth: Mucous membranes are moist       Pharynx: Oropharynx is clear  Eyes:      Extraocular Movements: Extraocular movements intact  Conjunctiva/sclera: Conjunctivae normal    Cardiovascular:      Rate and Rhythm: Normal rate and regular rhythm  Pulses: Normal pulses  Heart sounds: Normal heart sounds  Pulmonary:      Effort: Pulmonary effort is normal       Breath sounds: Normal breath sounds  Abdominal:      General: Bowel sounds are normal       Palpations: Abdomen is soft  Tenderness: There is no abdominal tenderness  Musculoskeletal:         General: Normal range of motion  Cervical back: Normal range of motion  Skin:     General: Skin is warm and dry  Coloration: Skin is pale  Neurological:      General: No focal deficit present  Mental Status: He is alert and oriented to person, place, and time  Mental status is at baseline  Cranial Nerves: No cranial nerve deficit  Sensory: No sensory deficit  Motor: No weakness  Coordination: Coordination normal       Comments:  Answers questions, but answers slowly         ED Medications  Medications   aspirin chewable tablet 81 mg (81 mg Oral Given 6/26/23 0850)   atorvastatin (LIPITOR) tablet 10 mg (10 mg Oral Given 6/26/23 0852)   calcitriol (ROCALTROL) capsule 0 25 mcg (0 25 mcg Oral Given 6/26/23 0851)   cyanocobalamin (VITAMIN B-12) tablet 100 mcg (100 mcg Oral Given 6/26/23 0852)   apixaban (ELIQUIS) tablet 5 mg (5 mg Oral Given 6/26/23 0852)   ferrous sulfate tablet 325 mg (325 mg Oral Given 6/26/23 0850)   fludrocortisone (FLORINEF) tablet 0 2 mg (0 2 mg Oral Given 6/26/23 0853)   midodrine (PROAMATINE) tablet 15 mg (15 mg Oral Given 6/26/23 1141)   potassium chloride (K-DUR,KLOR-CON) CR tablet 20 mEq (20 mEq Oral Given 6/26/23 0851)   risperiDONE (RisperDAL) tablet 1 mg (1 mg Oral Given 6/26/23 0851)   sertraline (ZOLOFT) tablet 100 mg (100 mg Oral Given 6/26/23 0852)   sodium chloride tablet 1 g (1 g Oral Given 6/26/23 0851)   ondansetron (ZOFRAN) injection 4 mg (has no administration in time range)   insulin lispro (HumaLOG) 100 units/mL subcutaneous injection 1-6 Units (6 Units Subcutaneous Given 6/26/23 1140)   insulin lispro (HumaLOG) 100 units/mL subcutaneous injection 1-5 Units ( Subcutaneous Not Given 6/25/23 2224)   acetaminophen (TYLENOL) tablet 650 mg (650 mg Oral Given 6/25/23 2221)   hydrALAZINE (APRESOLINE) injection 10 mg (10 mg Intravenous Given 6/26/23 0153)   insulin lispro (HumaLOG) 100 units/mL subcutaneous injection 6 Units (has no administration in time range)   insulin lispro (HumaLOG) 100 units/mL subcutaneous injection 7 Units (7 Units Subcutaneous Given 6/26/23 1140)   insulin lispro (HumaLOG) 100 units/mL subcutaneous injection 7 Units (has no administration in time range)   insulin glargine (LANTUS) subcutaneous injection 20 Units 0 2 mL (has no administration in time range)   hydrocortisone (CORTEF) tablet 40 mg (has no administration in time range)   hydrocortisone (CORTEF) tablet 30 mg (has no administration in time range)   hydrocortisone (Solu-CORTEF) injection 100 mg (100 mg Intravenous Given 6/25/23 2045)   aspirin chewable tablet 162 mg (162 mg Oral Given 6/25/23 2110)       Diagnostic Studies  Results Reviewed     Procedure Component Value Units Date/Time    Hemoglobin A1C [[id number]] Collected: 06/26/23 0408    Lab Status:  In process Specimen: Blood from Arm, Left Updated: 06/26/23 9968    Basic metabolic panel [[id number]]  (Abnormal) Collected: 06/26/23 0408    Lab Status: Final result Specimen: Blood from Arm, Left Updated: 06/26/23 7807     Sodium 139 mmol/L      Potassium 4 3 mmol/L      Chloride 113 mmol/L      CO2 26 mmol/L      ANION GAP 0 mmol/L      BUN 16 mg/dL      Creatinine 1 11 mg/dL      Glucose 225 mg/dL      Calcium 8 6 mg/dL      eGFR 68 ml/min/1 73sq m     Narrative: [employer] guidelines for Chronic Kidney Disease (CKD):   •  Stage 1 with normal or high GFR (GFR > 90 mL/min/1 73 square meters)  •  Stage 2 Mild CKD (GFR = 60-89 mL/min/1 73 square meters)  •  Stage 3A Moderate CKD (GFR = 45-59 mL/min/1 73 square meters)  •  Stage 3B Moderate CKD (GFR = 30-44 mL/min/1 73 square meters)  •  Stage 4 Severe CKD (GFR = 15-29 mL/min/1 73 square meters)  •  Stage 5 End Stage CKD (GFR <15 mL/min/1 73 square meters)  Note: GFR calculation is accurate only with a steady state creatinine    CBC (With Platelets) [[id number]]  (Abnormal) Collected: 06/26/23 0408    Lab Status: Final result Specimen: Blood from Arm, Left Updated: 06/26/23 0429     WBC 6 60 Thousand/uL      RBC 3 74 Million/uL      Hemoglobin 10 6 g/dL      Hematocrit 33 8 %      MCV 90 fL      MCH 28 3 pg      MCHC 31 4 g/dL      RDW 16 1 %      Platelets 645 Thousands/uL      MPV 12 0 fL     HS Troponin I 4hr [[id number]]  (Abnormal) Collected: 06/25/23 2334    Lab Status: Final result Specimen: Blood from Arm, Left Updated: 06/26/23 0011     hs TnI 4hr 134 ng/L      Delta 4hr hsTnI -36 ng/L     Fingerstick Glucose (POCT) [[id number]]  (Normal) Collected: 06/25/23 2220    Lab Status: Final result Updated: 06/25/23 2226     POC Glucose 138 mg/dl     HS Troponin I 2hr [[id number]]  (Abnormal) Collected: 06/25/23 2138    Lab Status: Final result Specimen: Blood from Arm, Left Updated: 06/25/23 2212     hs TnI 2hr 147 ng/L      Delta 2hr hsTnI -23 ng/L     TSH, 3rd generation with Free T4 reflex [[id number]]  (Normal) Collected: 06/25/23 1930    Lab Status: Final result Specimen: Blood from Arm, Right Updated: 06/25/23 2016     TSH 3RD GENERATON 0 779 uIU/mL     HS Troponin 0hr (reflex protocol) [[id number]]  (Abnormal) Collected: 06/25/23 1930    Lab Status: Final result Specimen: Blood from Arm, Right Updated: 06/25/23 2003     hs TnI 0hr 170 ng/L     Comprehensive metabolic panel [[id number]]  (Abnormal) Collected: 06/25/23 1930    Lab Status: Final result Specimen: Blood from Arm, Right Updated: 06/25/23 1956     Sodium 139 mmol/L      Potassium 4 8 mmol/L      Chloride 115 mmol/L      CO2 24 mmol/L      ANION GAP 0 mmol/L      BUN 12 mg/dL      Creatinine 1 24 mg/dL      Glucose 103 mg/dL      Calcium 8 5 mg/dL      Corrected Calcium 9 1 mg/dL      AST 15 U/L      ALT 19 U/L      Alkaline Phosphatase 64 U/L      Total Protein 6 2 g/dL      Albumin 3 2 g/dL      Total Bilirubin 0 37 mg/dL      eGFR 60 ml/min/1 73sq m     Narrative:      [employer] guidelines for Chronic Kidney Disease (CKD):   •  Stage 1 with normal or high GFR (GFR > 90 mL/min/1 73 square meters)  •  Stage 2 Mild CKD (GFR = 60-89 mL/min/1 73 square meters)  •  Stage 3A Moderate CKD (GFR = 45-59 mL/min/1 73 square meters)  •  Stage 3B Moderate CKD (GFR = 30-44 mL/min/1 73 square meters)  •  Stage 4 Severe CKD (GFR = 15-29 mL/min/1 73 square meters)  •  Stage 5 End Stage CKD (GFR <15 mL/min/1 73 square meters)  Note: GFR calculation is accurate only with a steady state creatinine    CBC and differential [[id number]]  (Abnormal) Collected: 06/25/23 1930    Lab Status: Final result Specimen: Blood from Arm, Right Updated: 06/25/23 1937     WBC 7 30 Thousand/uL      RBC 3 37 Million/uL      Hemoglobin 9 8 g/dL      Hematocrit 30 6 %      MCV 91 fL      MCH 29 1 pg      MCHC 32 0 g/dL      RDW 16 3 %      MPV 12 0 fL      Platelets 373 Thousands/uL      nRBC 0 /100 WBCs      Neutrophils Relative 79 %      Immat GRANS % 0 %      Lymphocytes Relative 10 %      Monocytes Relative 11 %      Eosinophils Relative 0 %      Basophils Relative 0 %      Neutrophils Absolute 5 64 Thousands/µL      Immature Grans Absolute 0 03 Thousand/uL      Lymphocytes Absolute 0 76 Thousands/µL      Monocytes Absolute 0 82 Thousand/µL      Eosinophils Absolute 0 03 Thousand/µL      Basophils Absolute 0 02 Thousands/µL     Fingerstick Glucose (POCT) [[id number]]  (Normal) Collected: 06/25/23 1900    Lab Status: Final result Updated: 06/25/23 1909     POC Glucose 96 mg/dl                  XR chest 1 view portable   Final Result by Kwan Delaney MD (06/26 7883)      No acute cardiopulmonary disease  Workstation performed: [id number]               Procedures  Procedures      ED Course  ED Course as of 06/26/23 1524   Sun Jun 25, 2023   1930 EKG: normal sinus rhythm  T wave inversions in lateral leads, changed from previous                                         Medical Decision Making  79-year-old male patient with history of diabetes, adrenal insufficiency presenting with fluctuating sugars onset 2 days ago  Patient also having episodes of syncope and falling to the ground  Patient has also having been decreased activity  Exam shows patient being pale and slightly confused  DDx includes hypothyroid, infection, increased insulin use, other etiology causing hypoglycemia  Labs show elevated troponin, sugars 96 in the ED  Chest x-ray negative  Admitted to medicine for elevated troponin and consistent decreased blood sugar  Patient given hydrocortisone with history of adrenal insufficiency  Elevated troponin:     Details: Admitted to medicine  Hypoglycemia:     Details: Given Solu-Cortef  Amount and/or Complexity of Data Reviewed  Labs: ordered  Discussion of management or test interpretation with external provider(s): Admitted to medicine    Risk  OTC drugs  Prescription drug management  Decision regarding hospitalization              Disposition  Final diagnoses:   Hypoglycemia   Elevated troponin   Lightheadedness     Time reflects when diagnosis was documented in both MDM as applicable and the Disposition within this note     Time User Action Codes Description Comment    6/25/2023  8:47 PM [place] Fore D Add [E27 40] Adrenal insufficiency      6/25/2023  8:48 PM [place] Fore D Add [E10 65] Type 1 diabetes mellitus with hyperglycemia ([place] [address] )     6/25/2023  8:48 PM Bridgette Stefan D Add [E16 2] Recurrent hypoglycemia     6/25/2023  8:50 PM [facility] Add [E16 2] Hypoglycemia     6/25/2023  8:50 PM Manda Cosby Add [R77 8] Elevated troponin     6/25/2023  8:50 PM Margarite Hubbard Add [R42] Lightheadedness     6/25/2023 10:27 PM Bridgette Athens D Add [I95 1] Orthostatic hypotension       ED Disposition     ED Disposition   Admit    Condition   Stable    Date/Time   Sun Jun 25, 2023  8:50 PM    Comment   Case was discussed with Dr Sammy Patton and the patient's admission status was agreed to be Admission Status: inpatient status to the service of Dr Sammy Patton   Follow-up Information    None         Current Discharge Medication List      START taking these medications    Details   !! BD Pen Needle Blessing 2nd Gen 32G X 4 MM MISC FOR USE WITH INSULIN PEN  PHARMACY MAY DISPENSE BRAND COVERED BY INSURANCE  Qty: 100 each, Refills: 3    Comments: DX Code Needed    Associated Diagnoses: Insulin dependent type 1 diabetes mellitus ([place] [address] )       ! ! - Potential duplicate medications found  Please discuss with provider        CONTINUE these medications which have NOT CHANGED    Details   aspirin 81 mg chewable tablet Chew 1 tablet (81 mg total) daily  Qty: 30 tablet, Refills: 0    Associated Diagnoses: Cerebrovascular accident (CVA) ([place] [address] )      atorvastatin (LIPITOR) 10 mg tablet Take 1 tablet by mouth daily      bisacodyl (DULCOLAX) 5 mg EC tablet Take 2 tablets (10 mg total) by mouth once for 1 dose  Qty: 2 tablet, Refills: 0    Associated Diagnoses: Screen for colon cancer      calcitriol (ROCALTROL) 0 25 mcg capsule TAKE 1 CAPSULE BY MOUTH EVERY DAY  Qty: 90 capsule, Refills: 1    Associated Diagnoses: Stage 5 chronic kidney disease not on chronic dialysis (HCC)      Continuous Blood Gluc  (Dexcom G6 ) STEPHEN Use 1 Units continuous  Qty: 1 each, Refills: 0    Associated Diagnoses: Type 1 diabetes mellitus with hyperglycemia ([facility] )      Continuous Blood Gluc Sensor (Dexcom G6 Sensor) MISC Use 1 each every 10 days  Qty: 3 each, Refills: 2    Associated Diagnoses: Type 1 diabetes mellitus with hyperglycemia ([facility])      Continuous Blood Gluc Transmit (Dexcom G6 Transmitter) MISC 1 each every 3 months  Qty: 1 each, Refills: 2    Associated Diagnoses: Type 1 diabetes mellitus with hyperglycemia ([facility])      cyanocobalamin (VITAMIN B-12) 100 mcg tablet Take by mouth daily      Eliquis 5 MG TAKE 1 TABLET BY MOUTH TWICE A DAY  Qty: 60 tablet, Refills: 1    Associated Diagnoses: Type 2 MI (myocardial infarction) (HCC)      ferrous sulfate 324 (65 Fe) mg Take 1 tablet (324 mg total) by mouth daily before breakfast  Qty: 30 tablet, Refills: 5    Associated Diagnoses: Iron deficiency anemia, unspecified      fludrocortisone (FLORINEF) 0 1 mg tablet TAKE 2 TABLETS BY MOUTH DAILY  Qty: 60 tablet, Refills: 2    Associated Diagnoses: Adrenal insufficiency ([facility] )      ! ! hydrocortisone (CORTEF) 5 mg tablet TAKE 20MG(4 TABS) ON WAKING UP AND 15MG(3 TABS) IN EVENING  Qty: 180 tablet, Refills: 1    Comments: DX Code Needed    Associated Diagnoses: Adrenal insufficiency ([facility] )      ! ! hydrocortisone (CORTEF) 5 mg tablet Take 20mg(4 tabs) on waking up and  15mg(3 tabs) in evening  Qty: 180 tablet, Refills: 1    Associated Diagnoses: Adrenal insufficiency ([facility])      insulin glargine (Toujeo SoloStar) 300 units/mL CONCENTRATED U-300 injection pen (1-unit dial) Inject 20 Units under the skin daily at bedtime  Qty: 15 mL, Refills: 0    Associated Diagnoses: Insulin dependent type 1 diabetes mellitus (HCC)      insulin lispro (HumaLOG) 100 units/mL injection pen Use 6 units of breakfast, 7 units with lunch and 7 units with dinner +1 unit per 50 above 150 mg/dL; maximum daily dose 40 units  Qty: 15 mL, Refills: 2    Associated Diagnoses: Insulin dependent type 1 diabetes mellitus ([facility] )      ! !  Insulin Pen Needle (BD Pen Needle Blessing 2nd Gen) 32G X 4 MM MISC For use with insulin pen  Pharmacy may dispense brand covered by insurance  Qty: 100 each, Refills: 0    Associated Diagnoses: Insulin dependent type 1 diabetes mellitus (HCC)      Lantus SoloStar 100 units/mL SOPN       midodrine (PROAMATINE) 10 MG tablet TAKE 1 AND 1/2 TABLETS (15 MG TOTAL) BY MOUTH 3 (THREE) TIMES A DAY BEFORE MEALS  Qty: 405 tablet, Refills: 4    Associated Diagnoses: Syncope      polyethylene glycol (GOLYTELY) 4000 mL solution Take 4,000 mL by mouth once for 1 dose  Qty: 4000 mL, Refills: 0    Associated Diagnoses: Screen for colon cancer      potassium chloride (Klor-Con) 10 mEq tablet Take 20 mEq by mouth 2 (two) times a day      risperiDONE (RisperDAL) 1 mg tablet Take 1 tablet by mouth 2 (two) times a day      sertraline (ZOLOFT) 100 mg tablet Take 100 mg by mouth daily      sodium chloride 1 g tablet Take 1 tablet (1 g total) by mouth 3 (three) times a day  Qty: 270 tablet, Refills: 3    Associated Diagnoses: Orthostatic hypotension      sodium chloride, concentrated, 2 5 MEQ/ML Take 1 g by mouth Three times a day       !! - Potential duplicate medications found  Please discuss with provider  No discharge procedures on file  PDMP Review       Value Time User    PDMP Reviewed  Yes 6/25/2023  9:46 PM Alireza Carrington DO           ED Provider  Attending physically available and evaluated Antonella Davidson I managed the patient along with the ED Attending      Electronically Signed by         Ignacia Garnica MD  06/26/23 9883

## 2023-06-25 NOTE — ED ATTENDING ATTESTATION
6/25/2023  I, Helen Lennox, MD, saw and evaluated the patient  I have discussed the patient with the resident/non-physician practitioner and agree with the resident's/non-physician practitioner's findings, Plan of Care, and MDM as documented in the resident's/non-physician practitioner's note, except where noted  All available labs and Radiology studies were reviewed  I was present for key portions of any procedure(s) performed by the resident/non-physician practitioner and I was immediately available to provide assistance  At this point I agree with the current assessment done in the Emergency Department  I have conducted an independent evaluation of this patient a history and physical is as follows:    Chief Complaint   Patient presents with   • Hypoglycemia - Symptomatic     Per pt and daughter, pt has had fluctuation blood sugars x 24h, at home bg read 113, currently 96  Last night pt got as low as in the 40s, but refused to come to the er when ems arrived to their house  Pt aao x3 at this time, states he has a headache  DM, adrenal insufficiency, presenting with fatigue generalized weakness    BG was in 30-40s two days ago, received glucagon, refused transport at that time  Feels light-headed and weakness  Had a syncopal episode      Blood sugar  Anemia  Endocrine  Infection      ED Course         Critical Care Time  ECG 12 Lead Documentation Only    Date/Time: 6/25/2023 7:20 PM    Performed by: Helen Lennox, MD  Authorized by: Helen Lennox, MD injuries. /86   Pulse 83   Temp 98.1 °F (36.7 °C)   Resp 16   Ht 5' 4" (1.626 m)   Wt 84.3 kg (185 lb 13.6 oz)   SpO2 99%   BMI 31.90 kg/m²      Vital signs and nursing notes reviewed    CONSTITUTIONAL: Chronically ill-appearing male appearing stated age resting in bed, in no acute distress  HEENT: atraumatic, normocephalic. Sclera anicteric, conjunctiva are not injected. Moist oral mucosa  CARDIOVASCULAR/CHEST: RRR, no M/R/G. 2+ radial pulses  PULMONARY: Breathing comfortably on RA. Breath sounds are equal and clear to auscultation  ABDOMEN: non-distended. BS present, normoactive. Non-tender  MSK: moves all extremities, no deformities, no peripheral edema, no calf asymmetry  NEURO: Awake and alert, oriented, slow to answer, but answers appropriately. Pupils 2 mm and reactive, extraocular movements are intact without nystagmus, face symmetric, uvula elevates midline, tongue protrudes midline. 5/5 strength in bilateral upper and lower extremities. No pronator drift. Moves all extremities spontaneously. SKIN: Warm, appears well-perfused  MENTAL STATUS: Somewhat confused/slow to respond      ED Course     Medications   hydrocortisone (Solu-CORTEF) injection 100 mg (100 mg Intravenous Given 6/25/23 2045)   aspirin chewable tablet 162 mg (162 mg Oral Given 6/25/23 240)     71-year-old male presenting with fluctuating blood glucose with episodes of hypoglycemia, generalized weakness, confusion, and syncope. Differential diagnosis is broad, including underlying infection, medication error, worsening adrenal insufficiency, hypothyroidism, versus another etiology of symptoms. Neuro exam reveals a patient that is somewhat slow to answer questions, however, exam is otherwise non-focal. Doubt stroke. Given adrenal insufficiency and current illness, we are administering a stress dose of Solu-Cortef. In the ER, patient's point-of-care glucose is 96.   His CMP reveals creatinine of 1.24, no significant electrolyte abnormality. Initial high-sensitivity troponin is elevated, 170. CBC is without leukocytosis but with neutrophil predominance, with hemoglobin of 9.8, with platelet count of 824. TSH is normal at 0.779. Chest x-ray to my interpretation is without infiltrates, nothing to suggest pneumonia. Patient admitted to internal medicine given symptoms and elevated troponin. Procedures  ECG 12 Lead Documentation Only    Date/Time: 6/25/2023 7:20 PM    Performed by: Michael Hedrick MD  Authorized by: Michael Hedrick MD    Comments:      Normal sinus rhythm, ventricular rate 85, OH interval 216 consistent with first-degree AV block, QRS 72, QTc 409, there are T wave inversions in lateral precordial, high lateral, and inferior leads. Compared to prior EKG dated 1/17/2023, T wave inversions are more pronounced in anterior lateral and inferior leads.

## 2023-06-26 LAB
4HR DELTA HS TROPONIN: -36 NG/L
ANION GAP SERPL CALCULATED.3IONS-SCNC: 0 MMOL/L
BUN SERPL-MCNC: 16 MG/DL (ref 5–25)
CALCIUM SERPL-MCNC: 8.6 MG/DL (ref 8.3–10.1)
CARDIAC TROPONIN I PNL SERPL HS: 134 NG/L
CHLORIDE SERPL-SCNC: 113 MMOL/L (ref 96–108)
CO2 SERPL-SCNC: 26 MMOL/L (ref 21–32)
CREAT SERPL-MCNC: 1.11 MG/DL (ref 0.6–1.3)
ERYTHROCYTE [DISTWIDTH] IN BLOOD BY AUTOMATED COUNT: 16.1 % (ref 11.6–15.1)
EST. AVERAGE GLUCOSE BLD GHB EST-MCNC: 212 MG/DL
GFR SERPL CREATININE-BSD FRML MDRD: 68 ML/MIN/1.73SQ M
GLUCOSE SERPL-MCNC: 181 MG/DL (ref 65–140)
GLUCOSE SERPL-MCNC: 225 MG/DL (ref 65–140)
GLUCOSE SERPL-MCNC: 279 MG/DL (ref 65–140)
GLUCOSE SERPL-MCNC: 355 MG/DL (ref 65–140)
GLUCOSE SERPL-MCNC: 413 MG/DL (ref 65–140)
HBA1C MFR BLD: 9 %
HCT VFR BLD AUTO: 33.8 % (ref 36.5–49.3)
HGB BLD-MCNC: 10.6 G/DL (ref 12–17)
MCH RBC QN AUTO: 28.3 PG (ref 26.8–34.3)
MCHC RBC AUTO-ENTMCNC: 31.4 G/DL (ref 31.4–37.4)
MCV RBC AUTO: 90 FL (ref 82–98)
PLATELET # BLD AUTO: 131 THOUSANDS/UL (ref 149–390)
PMV BLD AUTO: 12 FL (ref 8.9–12.7)
POTASSIUM SERPL-SCNC: 4.3 MMOL/L (ref 3.5–5.3)
RBC # BLD AUTO: 3.74 MILLION/UL (ref 3.88–5.62)
SODIUM SERPL-SCNC: 139 MMOL/L (ref 135–147)
WBC # BLD AUTO: 6.6 THOUSAND/UL (ref 4.31–10.16)

## 2023-06-26 PROCEDURE — 85027 COMPLETE CBC AUTOMATED: CPT | Performed by: INTERNAL MEDICINE

## 2023-06-26 PROCEDURE — 82948 REAGENT STRIP/BLOOD GLUCOSE: CPT

## 2023-06-26 PROCEDURE — 36415 COLL VENOUS BLD VENIPUNCTURE: CPT | Performed by: INTERNAL MEDICINE

## 2023-06-26 PROCEDURE — 83036 HEMOGLOBIN GLYCOSYLATED A1C: CPT | Performed by: INTERNAL MEDICINE

## 2023-06-26 PROCEDURE — 80048 BASIC METABOLIC PNL TOTAL CA: CPT | Performed by: INTERNAL MEDICINE

## 2023-06-26 PROCEDURE — 99222 1ST HOSP IP/OBS MODERATE 55: CPT | Performed by: INTERNAL MEDICINE

## 2023-06-26 PROCEDURE — 99223 1ST HOSP IP/OBS HIGH 75: CPT | Performed by: INTERNAL MEDICINE

## 2023-06-26 PROCEDURE — 99232 SBSQ HOSP IP/OBS MODERATE 35: CPT | Performed by: PHYSICIAN ASSISTANT

## 2023-06-26 RX ORDER — INSULIN LISPRO 100 [IU]/ML
7 INJECTION, SOLUTION INTRAVENOUS; SUBCUTANEOUS
Status: DISCONTINUED | OUTPATIENT
Start: 2023-06-26 | End: 2023-06-27 | Stop reason: HOSPADM

## 2023-06-26 RX ORDER — INSULIN GLARGINE 100 [IU]/ML
20 INJECTION, SOLUTION SUBCUTANEOUS
Status: DISCONTINUED | OUTPATIENT
Start: 2023-06-26 | End: 2023-06-27

## 2023-06-26 RX ORDER — HYDROCORTISONE 20 MG/1
40 TABLET ORAL DAILY
Status: DISCONTINUED | OUTPATIENT
Start: 2023-06-27 | End: 2023-06-27 | Stop reason: HOSPADM

## 2023-06-26 RX ORDER — HYDRALAZINE HYDROCHLORIDE 20 MG/ML
10 INJECTION INTRAMUSCULAR; INTRAVENOUS EVERY 6 HOURS PRN
Status: DISCONTINUED | OUTPATIENT
Start: 2023-06-26 | End: 2023-06-27 | Stop reason: HOSPADM

## 2023-06-26 RX ORDER — INSULIN LISPRO 100 [IU]/ML
6 INJECTION, SOLUTION INTRAVENOUS; SUBCUTANEOUS
Status: DISCONTINUED | OUTPATIENT
Start: 2023-06-27 | End: 2023-06-27 | Stop reason: HOSPADM

## 2023-06-26 RX ORDER — PEN NEEDLE, DIABETIC 32GX 5/32"
NEEDLE, DISPOSABLE MISCELLANEOUS
Qty: 100 EACH | Refills: 3 | Status: SHIPPED | OUTPATIENT
Start: 2023-06-26

## 2023-06-26 RX ORDER — FERROUS SULFATE TAB EC 324 MG (65 MG FE EQUIVALENT) 324 (65 FE) MG
324 TABLET DELAYED RESPONSE ORAL
Qty: 90 TABLET | Refills: 2 | Status: SHIPPED | OUTPATIENT
Start: 2023-06-26

## 2023-06-26 RX ADMIN — APIXABAN 5 MG: 5 TABLET, FILM COATED ORAL at 08:52

## 2023-06-26 RX ADMIN — HYDROCORTISONE 30 MG: 20 TABLET ORAL at 17:33

## 2023-06-26 RX ADMIN — HYDRALAZINE HYDROCHLORIDE 10 MG: 20 INJECTION, SOLUTION INTRAMUSCULAR; INTRAVENOUS at 01:53

## 2023-06-26 RX ADMIN — RISPERIDONE 1 MG: 1 TABLET ORAL at 08:51

## 2023-06-26 RX ADMIN — INSULIN LISPRO 1 UNITS: 100 INJECTION, SOLUTION INTRAVENOUS; SUBCUTANEOUS at 21:48

## 2023-06-26 RX ADMIN — POTASSIUM CHLORIDE 20 MEQ: 750 TABLET, EXTENDED RELEASE ORAL at 17:33

## 2023-06-26 RX ADMIN — INSULIN GLARGINE 20 UNITS: 100 INJECTION, SOLUTION SUBCUTANEOUS at 21:46

## 2023-06-26 RX ADMIN — MIDODRINE HYDROCHLORIDE 15 MG: 5 TABLET ORAL at 16:39

## 2023-06-26 RX ADMIN — APIXABAN 5 MG: 5 TABLET, FILM COATED ORAL at 17:33

## 2023-06-26 RX ADMIN — FLUDROCORTISONE ACETATE 0.2 MG: 0.1 TABLET ORAL at 08:53

## 2023-06-26 RX ADMIN — VITAM B12 100 MCG: 100 TAB at 08:52

## 2023-06-26 RX ADMIN — INSULIN LISPRO 7 UNITS: 100 INJECTION, SOLUTION INTRAVENOUS; SUBCUTANEOUS at 16:40

## 2023-06-26 RX ADMIN — HYDRALAZINE HYDROCHLORIDE 10 MG: 20 INJECTION, SOLUTION INTRAMUSCULAR; INTRAVENOUS at 23:21

## 2023-06-26 RX ADMIN — INSULIN LISPRO 4 UNITS: 100 INJECTION, SOLUTION INTRAVENOUS; SUBCUTANEOUS at 06:23

## 2023-06-26 RX ADMIN — MIDODRINE HYDROCHLORIDE 15 MG: 5 TABLET ORAL at 11:41

## 2023-06-26 RX ADMIN — SERTRALINE HYDROCHLORIDE 100 MG: 100 TABLET ORAL at 08:52

## 2023-06-26 RX ADMIN — RISPERIDONE 1 MG: 1 TABLET ORAL at 17:33

## 2023-06-26 RX ADMIN — SODIUM CHLORIDE 1 G: 1 TABLET ORAL at 21:46

## 2023-06-26 RX ADMIN — INSULIN LISPRO 6 UNITS: 100 INJECTION, SOLUTION INTRAVENOUS; SUBCUTANEOUS at 16:40

## 2023-06-26 RX ADMIN — SODIUM CHLORIDE 1 G: 1 TABLET ORAL at 08:51

## 2023-06-26 RX ADMIN — ASPIRIN 81 MG CHEWABLE TABLET 81 MG: 81 TABLET CHEWABLE at 08:50

## 2023-06-26 RX ADMIN — POTASSIUM CHLORIDE 20 MEQ: 750 TABLET, EXTENDED RELEASE ORAL at 08:51

## 2023-06-26 RX ADMIN — MIDODRINE HYDROCHLORIDE 15 MG: 5 TABLET ORAL at 06:46

## 2023-06-26 RX ADMIN — HYDROCORTISONE 20 MG: 20 TABLET ORAL at 08:53

## 2023-06-26 RX ADMIN — INSULIN LISPRO 6 UNITS: 100 INJECTION, SOLUTION INTRAVENOUS; SUBCUTANEOUS at 11:40

## 2023-06-26 RX ADMIN — FERROUS SULFATE TAB 325 MG (65 MG ELEMENTAL FE) 325 MG: 325 (65 FE) TAB at 08:50

## 2023-06-26 RX ADMIN — CALCITRIOL CAPSULES 0.25 MCG 0.25 MCG: 0.25 CAPSULE ORAL at 08:51

## 2023-06-26 RX ADMIN — ATORVASTATIN CALCIUM 10 MG: 10 TABLET, FILM COATED ORAL at 08:52

## 2023-06-26 RX ADMIN — INSULIN LISPRO 7 UNITS: 100 INJECTION, SOLUTION INTRAVENOUS; SUBCUTANEOUS at 11:40

## 2023-06-26 RX ADMIN — SODIUM CHLORIDE 1 G: 1 TABLET ORAL at 16:39

## 2023-06-26 NOTE — CONSULTS
Consultation - Cardiology Team One  Gordo Vieyra 79 y o  male MRN: [id number]  Unit/Bed#: [facility] 431-01 Encounter: [id number]    Inpatient consult to Cardiology  Consult performed by: Colin Davidson PA-C  Consult ordered by: Raji Mendez DO          Physician Requesting Consult: Brynn Granda, *  Reason for Consult / Principal Problem: Syncope, abnormal EKG    Assessment:    1  Abnormal EKG: Noted to have slightly more prominent inferolateral T wave inversions on EKG  Patient denies angina or anginal equivalent  troponin 170--> 147--> 134  Negative nuclear stress test in 8/2022 from [facility]  2   PAF: Currently maintaining NSR  · [id number] 5: Maintained on Eliquis 5 mg BID  3  Preserved biventricular systolic function: EF 12% with no WMA, G1DD, normal RV function, mild LA dilatation, no significant valvular abnormality on echocardiogram 1/9/2023  4   Orthostatic hypotension: Maintained on midodrine 15 mg TID, Florinef 0 2 mg daily, and  hydrocortisone 20 mg am and 15 mg pm   Uses abdominal binder and compression stockings  5   Hypoglycemia with type I DM: Has had fluctuating blood sugars dropping to the 40s with associated lightheadedness and confusion  Blood sugar 96 POA  Management per primary team   6   History of CVA: Maintained on aspirin and statin  7  Chronic anemia: Baseline hemoglobin 9-10  Hemoglobin currently at baseline  8   Renal insufficiency      Plan/Recommendations:  · No evidence of ACS  · Continue current medications for orthostatic hypotension  · Continue abdominal binder and compression stockings  __________________________________________________________________    CC: Hypoglycemia      History of Present Illness   HPI: Gordo Vieyra is a 79y o  year old male who has PAF, orthostatic hypotension, adrenal insufficiency, type I DM, history of CVA, anemia who follows with cardiologist Dr Margaretann Severe    He was last seen in the office 2/2023 where he reported continued concerns of orthostatic hypotension  Midodrine and Florinef were uptitrated at this office visit  Adequate oral intake was recommended and patient was advised that should he have drops in blood pressures to proceed to the ED for IV hydration  Patient presents to the emergency room at [place] [id number] on 6/25/2023 AMS with symptomatic hypoglycemia  Per patient and daughter he had fluctuations of blood sugars over the last 2 days with lightheadedness and confusion  Patient's blood sugar was as low as 40 the evening prior therefore EMS was called and glucagon was administered but had refused to go to the ED at that time  As patient had recurrent symptoms the following day he proceeded to the ED for further evaluation  On arrival to the ED patient's vital signs were stable with oxygen saturation 96% on room air  EKG revealed sinus rhythm with inferolateral T wave abnormalities that appeared more pronounced when compared to prior EKG from 1/2023  Labs revealed blood sugar 96, stable CMP, troponin 170--> 147--> 134, hemoglobin 9 8, stable TSH  Cardiology has been consulted for abnormal EKG and syncope  Medication regimen includes atorvastatin 10 mg daily, aspirin 81 mg daily, Eliquis 5 mg BID, midodrine 15 mg TID, Florinef 0 2 mg daily, hydrocortisone 20 mg am and 15 mg pm     Patient resting in bed during consultation and is a limited historian  He was giving one-word answers  He denies any chest pain, shortness of breath, palpitations, lightheadedness or dizziness  He denies any syncope  Echocardiogram 1/9/2023: EF 65% with no WMA, G1DD, normal RV function, mild LA dilatation, no significant valvular abnormality  Nuclear stress test 8/2022 at [facility]: No reversible defect    EKG reviewed personally: 6/25/2023-sinus rhythm at a rate of 85 bpm with first-degree AVB and inferolateral T wave abnormality    When compared to the EKG from 1/17/2023 T wave inversions more prominent inferolaterally  Telemetry reviewed personally: Sinus rhythm        Review of Systems   Constitutional: Negative  Negative for chills  Cardiovascular: Negative for chest pain, dyspnea on exertion, leg swelling, near-syncope, orthopnea, palpitations, paroxysmal nocturnal dyspnea and syncope  Respiratory: Negative  Negative for cough, shortness of breath and wheezing  Endocrine: Negative  Hematologic/Lymphatic: Negative  Skin: Negative  Musculoskeletal: Negative  Gastrointestinal: Negative  Negative for diarrhea, nausea and vomiting  Neurological: Negative for dizziness, light-headedness and weakness  Psychiatric/Behavioral: Negative  Negative for altered mental status  All other systems reviewed and are negative  Historical Information   Past Medical History:   Diagnosis Date   • Diabetes mellitus ([facility] )    • Obesity, morbid ([facility] ) 11/14/2022     History reviewed  No pertinent surgical history    Social History     Substance and Sexual Activity   Alcohol Use Not Currently   • Alcohol/week: 5 0 standard drinks of alcohol   • Types: 5 Cans of beer per week    Comment: Quit in august     Social History     Substance and Sexual Activity   Drug Use Never     Social History     Tobacco Use   Smoking Status Former   • Packs/day: 0 25   • Years: 30 00   • Total pack years: 7 50   • Types: Cigarettes   Smokeless Tobacco Never     Family History:   Family History   Problem Relation Age of Onset   • Heart disease Mother    • Cancer Father    • Multiple sclerosis Sister        Meds/Allergies   all current active meds have been reviewed, current meds:   Current Facility-Administered Medications   Medication Dose Route Frequency   • acetaminophen (TYLENOL) tablet 650 mg  650 mg Oral Q6H PRN   • apixaban (ELIQUIS) tablet 5 mg  5 mg Oral BID   • aspirin chewable tablet 81 mg  81 mg Oral Daily   • atorvastatin (LIPITOR) tablet 10 mg  10 mg Oral Daily   • calcitriol (ROCALTROL) capsule 0 25 mcg  0 25 mcg Oral Daily   • cyanocobalamin (VITAMIN B-12) tablet 100 mcg  100 mcg Oral Daily   • ferrous sulfate tablet 325 mg  325 mg Oral Daily With Breakfast   • fludrocortisone (FLORINEF) tablet 0 2 mg  0 2 mg Oral Daily   • hydrALAZINE (APRESOLINE) injection 10 mg  10 mg Intravenous Q6H PRN   • hydrocortisone (CORTEF) tablet 15 mg  15 mg Oral QPM   • hydrocortisone (CORTEF) tablet 20 mg  20 mg Oral Daily   • insulin glargine (LANTUS) subcutaneous injection 10 Units 0 1 mL  10 Units Subcutaneous HS   • insulin lispro (HumaLOG) 100 units/mL subcutaneous injection 1-5 Units  1-5 Units Subcutaneous HS   • insulin lispro (HumaLOG) 100 units/mL subcutaneous injection 1-6 Units  1-6 Units Subcutaneous TID AC   • midodrine (PROAMATINE) tablet 15 mg  15 mg Oral TID AC   • ondansetron (ZOFRAN) injection 4 mg  4 mg Intravenous Q6H PRN   • potassium chloride (K-DUR,KLOR-CON) CR tablet 20 mEq  20 mEq Oral BID   • risperiDONE (RisperDAL) tablet 1 mg  1 mg Oral BID   • sertraline (ZOLOFT) tablet 100 mg  100 mg Oral Daily   • sodium chloride tablet 1 g  1 g Oral TID    and PTA meds:   Prior to Admission Medications   Prescriptions Last Dose Informant Patient Reported? Taking? Continuous Blood Gluc  (Dexcom G6 ) STEPHEN   No No   Sig: Use 1 Units continuous   Continuous Blood Gluc Sensor (Dexcom G6 Sensor) MISC   No No   Sig: Use 1 each every 10 days   Continuous Blood Gluc Transmit (Dexcom G6 Transmitter) MISC   No No   Si each every 3 months   Eliquis 5 MG   No No   Sig: TAKE 1 TABLET BY MOUTH TWICE A DAY   Insulin Pen Needle (BD Pen Needle Blessing 2nd Gen) 32G X 4 MM MISC  Child No No   Sig: For use with insulin pen  Pharmacy may dispense brand covered by insurance  Insulin Pen Needle (BD Pen Needle Blessing 2nd Gen) 32G X 4 MM MISC   No No   Sig: For use with insulin pen  Pharmacy may dispense brand covered by insurance     Lantus SoloStar 100 units/mL SOPN  Child Yes No   Patient not taking: Reported on 3/30/2023   aspirin 81 mg chewable tablet   No No   Sig: Chew 1 tablet (81 mg total) daily   atorvastatin (LIPITOR) 10 mg tablet  Child Yes No   Sig: Take 1 tablet by mouth daily   bisacodyl (DULCOLAX) 5 mg EC tablet   No No   Sig: Take 2 tablets (10 mg total) by mouth once for 1 dose   calcitriol (ROCALTROL) 0 25 mcg capsule   No No   Sig: TAKE 1 CAPSULE BY MOUTH EVERY DAY   cyanocobalamin (VITAMIN B-12) 100 mcg tablet  Child Yes No   Sig: Take by mouth daily   ferrous sulfate 324 (65 Fe) mg   No No   Sig: Take 1 tablet (324 mg total) by mouth daily before breakfast   fludrocortisone (FLORINEF) 0 1 mg tablet   No No   Sig: TAKE 2 TABLETS BY MOUTH DAILY     hydrocortisone (CORTEF) 5 mg tablet   No No   Sig: TAKE 20MG(4 TABS) ON WAKING UP AND 15MG(3 TABS) IN EVENING   hydrocortisone (CORTEF) 5 mg tablet   No No   Sig: Take 20mg(4 tabs) on waking up and  15mg(3 tabs) in evening   insulin glargine (Toujeo SoloStar) 300 units/mL CONCENTRATED U-300 injection pen (1-unit dial)   No No   Sig: Inject 20 Units under the skin daily at bedtime   insulin lispro (HumaLOG) 100 units/mL injection pen   No No   Sig: Use 6 units of breakfast, 7 units with lunch and 7 units with dinner +1 unit per 50 above 150 mg/dL; maximum daily dose 40 units   midodrine (PROAMATINE) 10 MG tablet   No No   Sig: TAKE 1 AND 1/2 TABLETS (15 MG TOTAL) BY MOUTH 3 (THREE) TIMES A DAY BEFORE MEALS   polyethylene glycol (GOLYTELY) 4000 mL solution   No No   Sig: Take 4,000 mL by mouth once for 1 dose   potassium chloride (Klor-Con) 10 mEq tablet  Child Yes No   Sig: Take 20 mEq by mouth 2 (two) times a day   risperiDONE (RisperDAL) 1 mg tablet  Child Yes No   Sig: Take 1 tablet by mouth 2 (two) times a day   sertraline (ZOLOFT) 100 mg tablet  Child Yes No   Sig: Take 100 mg by mouth daily   sodium chloride 1 g tablet  Child No No   Sig: Take 1 tablet (1 g total) by mouth 3 (three) times a day   sodium chloride, concentrated, 2 5 MEQ/ML   Yes No   Sig: Take 1 g by mouth Three "times a day      Facility-Administered Medications: None          Allergies   Allergen Reactions   • Imipramine Other (See Comments)   • Lisinopril Other (See Comments)   • Paroxetine Other (See Comments)   • Penicillins Hives   • Rosiglitazone Other (See Comments)   • Troglitazone Other (See Comments)       Objective   Vitals: Blood pressure 96/53, pulse 84, temperature (!) 97 2 °F (36 2 °C), resp  rate (!) 10, height 5' 4\" (1 626 m), weight 84 3 kg (185 lb 13 6 oz), SpO2 100 %  ,     Body mass index is 31 9 kg/m²  ,     Systolic (71XZA), OHQ:407 , Min:96 , KQS:250     Diastolic (94HMI), NHH:71, Min:53, Max:97    Wt Readings from Last 3 Encounters:   06/26/23 84 3 kg (185 lb 13 6 oz)   06/01/23 83 3 kg (183 lb 9 6 oz)   03/30/23 82 1 kg (181 lb)      Lab Results   Component Value Date    CREATININE 1 11 06/26/2023    CREATININE 1 24 06/25/2023    CREATININE 1 16 05/31/2023             Intake/Output Summary (Last 24 hours) at 6/26/2023 0905  Last data filed at 6/26/2023 0811  Gross per 24 hour   Intake 298 ml   Output 250 ml   Net 48 ml     Weight (last 2 days)     Date/Time Weight    06/26/23 05:17:08 84 3 (185 85)        Invasive Devices     Peripheral Intravenous Line  Duration           Peripheral IV 04/13/23 Left Forearm 73 days    Peripheral IV 06/25/23 Left <1 day                  Physical Exam  Vitals and nursing note reviewed  Constitutional:       General: He is not in acute distress  Appearance: He is well-developed  Comments: On RA in NAD   HENT:      Head: Normocephalic and atraumatic  Neck:      Vascular: No JVD  Cardiovascular:      Rate and Rhythm: Normal rate and regular rhythm  Heart sounds: Normal heart sounds  No murmur heard  No friction rub  No gallop  Pulmonary:      Effort: Pulmonary effort is normal  No respiratory distress  Breath sounds: Normal breath sounds  No wheezing or rales  Chest:      Chest wall: No tenderness     Abdominal:      General: Bowel sounds " "are normal  There is no distension  Palpations: Abdomen is soft  Tenderness: There is no abdominal tenderness  Musculoskeletal:         General: No tenderness  Normal range of motion  Cervical back: Normal range of motion and neck supple  Right lower leg: No edema  Left lower leg: No edema  Skin:     General: Skin is warm and dry  Coloration: Skin is not pale  Findings: No erythema  Neurological:      Mental Status: He is alert and oriented to person, place, and time  Psychiatric:         Judgment: Judgment normal       Comments: Flat affect           LABORATORY RESULTS:      CBC with diff:   Results from last 7 days   Lab Units 06/26/23  0408 06/25/23 1930   WBC Thousand/uL 6 60 7 30   HEMOGLOBIN g/dL 10 6* 9 8*   HEMATOCRIT % 33 8* 30 6*   MCV fL 90 91   PLATELETS Thousands/uL 131* 148*   RBC Million/uL 3 74* 3 37*   MCH pg 28 3 29 1   MCHC g/dL 31 4 32 0   RDW % 16 1* 16 3*   MPV fL 12 0 12 0   NRBC AUTO /100 WBCs  --  0       CMP:  Results from last 7 days   Lab Units 06/26/23  0408 06/25/23 1930   POTASSIUM mmol/L 4 3 4 8   CHLORIDE mmol/L 113* 115*   CO2 mmol/L 26 24   BUN mg/dL 16 12   CREATININE mg/dL 1 11 1 24   CALCIUM mg/dL 8 6 8 5   AST U/L  --  15   ALT U/L  --  19   ALK PHOS U/L  --  64   EGFR ml/min/1 73sq m 68 60       BMP:  Results from last 7 days   Lab Units 06/26/23  0408 06/25/23 1930   POTASSIUM mmol/L 4 3 4 8   CHLORIDE mmol/L 113* 115*   CO2 mmol/L 26 24   BUN mg/dL 16 12   CREATININE mg/dL 1 11 1 24   CALCIUM mg/dL 8 6 8 5          No results found for: \"NTBNP\"       Results from last 7 days   Lab Units 06/25/23  1930   TSH 3RD GENERATON uIU/mL 0 779           Lipid Profile:   No results found for: \"CHOL\"  No results found for: \"HDL\"  No results found for: \"LDLCALC\"  No results found for: \"TRIG\"      Cardiac testing:   No results found for this or any previous visit  No results found for this or any previous visit      No valid procedures " specified  No results found for this or any previous visit  Imaging: I have personally reviewed pertinent reports  No results found  Counseling / Coordination of Care  Total floor / unit time spent today 45 minutes  Greater than 50% of total time was spent with the patient and / or family counseling and / or coordination of care  A description of the counseling / coordination of care: Review of history, current assessment, development of a plan  Code Status: Level 1 - Full Code    ** Please Note: Dragon 360 Dictation voice to text software may have been used in the creation of this document   **

## 2023-06-26 NOTE — CONSULTS
Consultation - Keo Morris 79 y o  male MRN: [id number]    Unit/Bed#: [facility] 431-01 Encounter: [id number]      Assessment/Plan     Assessment:   70yom with T1DM and adrenal insufficiency  Plan:     1  Type 1 diabetes, uncontrolled with hyper and hypoglycemia: Known to have highly variable BGs  Received only 10units of Lantus overnight, but ordered for his outpatient regimen of Lantus 20units once daily and Humalog 7/6/6  Has a correctional scale ordered  Will not adjust at this time but may need more depending on response to increased steroids (see below)    2  Adrenal insufficiency: Received home steroids, but will double for 24h in case there is another stressor precipitating current situation  In creased to HC 40mg qAM and 30mg qpm      History of Present Illness     HPI: Keo Morris is a 79y o  year old male who presents with reported hypoglycemia at home  Known W2LN with complications  Highly variable blood sugars  Also known adrenal insufficiency and orthostatic hypotension  He is a poor historian with poor memory  I spoke with hsi daughter, Bry Alejandro, for information  T1DM regimen Lantus 20units once daily and Humalog 7/6/6 with correctional insulin  Outpatient steroids 20/15 (Dec 2022 note), florinef 0 2  Seen by Dr Nathanael Schirmer, but missed last scheduled outpatient appt  Mr Lucrecia Fleming had been doing better until June 23rd when he developed low BGs without an obvious cause  He awoke with a morning BG 40 and was given glucagon by his family  They called EMS, but Mr Lucrecia Fleming  initially refused to go to the ED  But hypoglycemia persisted multiple times on Saturday(6/24)  and Sunday (6/25) until he agreed to come  At home Either Ms Jacque Mccabe or her  gives the insulin, and he is given his hydrocortisone by family or a caregiver  He takes meds administered to him and was not obviously ill or missing doses  There was no witnessed or reported vomiting    He wears a Dexcom G7 and his daughter uses the share function, so she will see that at night his BGs often will stay high, but he finishes his meals and does not report symptoms of early satiety  He was getting headaches at home  Having low Bps and syncopal  Has been falling at home  Inpatient consult to Endocrinology  Consult performed by: Huong Licea MD  Consult ordered by: Aditya Mahajan DO          Review of Systems   Constitutional: Negative for unexpected weight change  HENT: Negative for trouble swallowing and voice change  Eyes: Negative for visual disturbance  Respiratory: Negative for shortness of breath  Gastrointestinal: Negative for abdominal pain, constipation and diarrhea  Neurological: Negative for headaches  Psychiatric/Behavioral: The patient is not nervous/anxious  All other systems reviewed and are negative  However ROS may be unreliable  Historical Information   Past Medical History:   Diagnosis Date   • Diabetes mellitus ([facility] [address] )    • Obesity, morbid ([facility] ) 11/14/2022     History reviewed  No pertinent surgical history    Social History   Social History     Substance and Sexual Activity   Alcohol Use Not Currently   • Alcohol/week: 5 0 standard drinks of alcohol   • Types: 5 Cans of beer per week    Comment: Quit in august     Social History     Substance and Sexual Activity   Drug Use Never     Social History     Tobacco Use   Smoking Status Former   • Packs/day: 0 25   • Years: 30 00   • Total pack years: 7 50   • Types: Cigarettes   Smokeless Tobacco Never     E-Cigarette/Vaping   • E-Cigarette Use Never User      E-Cigarette/Vaping Substances      Family History:   Family History   Problem Relation Age of Onset   • Heart disease Mother    • Cancer Father    • Multiple sclerosis Sister        Meds/Allergies   current meds:   Current Facility-Administered Medications   Medication Dose Route Frequency   • acetaminophen (TYLENOL) tablet 650 mg  650 mg Oral Q6H PRN   • apixaban (ELIQUIS) tablet 5 mg  5 mg Oral BID • aspirin chewable tablet 81 mg  81 mg Oral Daily   • atorvastatin (LIPITOR) tablet 10 mg  10 mg Oral Daily   • calcitriol (ROCALTROL) capsule 0 25 mcg  0 25 mcg Oral Daily   • cyanocobalamin (VITAMIN B-12) tablet 100 mcg  100 mcg Oral Daily   • ferrous sulfate tablet 325 mg  325 mg Oral Daily With Breakfast   • fludrocortisone (FLORINEF) tablet 0 2 mg  0 2 mg Oral Daily   • hydrALAZINE (APRESOLINE) injection 10 mg  10 mg Intravenous Q6H PRN   • hydrocortisone (CORTEF) tablet 30 mg  30 mg Oral QPM   • [START ON 6/27/2023] hydrocortisone (CORTEF) tablet 40 mg  40 mg Oral Daily   • insulin glargine (LANTUS) subcutaneous injection 20 Units 0 2 mL  20 Units Subcutaneous HS   • insulin lispro (HumaLOG) 100 units/mL subcutaneous injection 1-5 Units  1-5 Units Subcutaneous HS   • insulin lispro (HumaLOG) 100 units/mL subcutaneous injection 1-6 Units  1-6 Units Subcutaneous TID AC   • [START ON 6/27/2023] insulin lispro (HumaLOG) 100 units/mL subcutaneous injection 6 Units  6 Units Subcutaneous Daily With Breakfast   • insulin lispro (HumaLOG) 100 units/mL subcutaneous injection 7 Units  7 Units Subcutaneous Daily With Lunch   • insulin lispro (HumaLOG) 100 units/mL subcutaneous injection 7 Units  7 Units Subcutaneous Daily With Dinner   • midodrine (PROAMATINE) tablet 15 mg  15 mg Oral TID AC   • ondansetron (ZOFRAN) injection 4 mg  4 mg Intravenous Q6H PRN   • potassium chloride (K-DUR,KLOR-CON) CR tablet 20 mEq  20 mEq Oral BID   • risperiDONE (RisperDAL) tablet 1 mg  1 mg Oral BID   • sertraline (ZOLOFT) tablet 100 mg  100 mg Oral Daily   • sodium chloride tablet 1 g  1 g Oral TID     Allergies   Allergen Reactions   • Imipramine Other (See Comments)   • Lisinopril Other (See Comments)   • Paroxetine Other (See Comments)   • Penicillins Hives   • Rosiglitazone Other (See Comments)   • Troglitazone Other (See Comments)       Objective   Vitals: Blood pressure 96/53, pulse 84, temperature (!) 97 2 °F (36 2 °C), resp  "rate (!) 10, height 5' 4\" (1 626 m), weight 84 3 kg (185 lb 13 6 oz), SpO2 100 %  Intake/Output Summary (Last 24 hours) at 6/26/2023 1438  Last data filed at 6/26/2023 1100  Gross per 24 hour   Intake 298 ml   Output 1050 ml   Net -752 ml     Invasive Devices     Peripheral Intravenous Line  Duration           Peripheral IV 04/13/23 Left Forearm 74 days    Peripheral IV 06/25/23 Left <1 day                Physical Exam    Physical Exam   Gen: appears well-developed and well-nourished  No apparent distress  Head: Normocephalic and atraumatic  Eyes: no stare or proptosis, no periorbital edema  E/N/M nl facies, hearing grossly intact  Neck: range of motion nl  Pulmonary/Chest: breathing  comfortably, no accessory muscle use, effort normal    Musculoskeletal: moves all 4 extremities  Neurological: No upper ext tremor appreciated  Skin: does not appear diaphoretic, no facial plethora  Psychiatric: normal mood and affect; behavior is normal      Lab Results: I have personally reviewed pertinent reports       Lab Results   Component Value Date    HGBA1C 7 4 (H) 01/24/2023     Lab Results   Component Value Date    SODIUM 139 06/26/2023    K 4 3 06/26/2023     (H) 06/26/2023    CO2 26 06/26/2023    BUN 16 06/26/2023    CREATININE 1 11 06/26/2023    GLUC 225 (H) 06/26/2023    CALCIUM 8 6 06/26/2023     Lab Results   Component Value Date    SQC3DTRVVTUP 0 779 06/25/2023       Imaging Studies:   EKG, Pathology, and Other Studies:   VTE Prophylaxis:     Code Status: Level 1 - Full Code  Advance Directive and Living Will:      Power of :    POLST:      Counseling / Coordination of Care        "

## 2023-06-26 NOTE — PROGRESS NOTES
[facility]  Progress Note  Name: Harleen Slater  MRN: [id number]  Unit/Bed#: [facility] 431-01 I Date of Admission: 6/25/2023   Date of Service: 6/26/2023 I Hospital Day: 1    Assessment/Plan   * Type 1 diabetes mellitus with hypoglycemia and without coma [facility])  Assessment & Plan  Lab Results   Component Value Date    HGBA1C 7 4 (H) 01/24/2023       Recent Labs     06/25/23  1900 06/25/23  2220 06/26/23  0551 06/26/23  1102   POCGLU 96 138 279* 413*       Blood Sugar Average: Last 72 hrs:  (P) 231 5   · Presented with increased weakness, lightheadedness, and episodes of hypoglycemia over the past 2 days reportedly as low as the 40s per family  · Uncertain etiology for the hypoglycemia episodes, did receive stress dose hydrocortisone during ED evaluation given overall presentation  · Reduce home long-acting insulin dosing for tonight to 10 units nightly and will resume mealtime insulin once good appetite is demonstrated  Blood sugar in 400's this afternoon, restart mealtime insulin and increase Lantus back to 20 units until seen by Endocrinology  · Appreciate endocrinology inputs as patient with known history of brittle diabetes and patient known to the practice  · Correctional insulin with Accu-Cheks initiated additionally  · Initiate hypoglycemia protocol    Orthostatic hypotension  Assessment & Plan  · Longstanding history of this, patient following with cardiology and endocrinology and believe related to type 1 diabetes with diabetic autonomic neuropathy and known adrenal insufficiency  · Patient reporting syncopal episode during the past 2 days, unwitnessed  EKG without acute ischemic changes however elevated troponin is noted  · Cardiology consult appreciated  Doubt ACS   Continue meds for orthostasis  · Continue Florinef 0 2 mg daily, hydrocortisone as above, midodrine 15 mg 3 times daily pending endocrinology inputs    Adrenal insufficiency   Assessment & Plan  · Follows with endocrinology for this, normally on Cortef 20 mg in a m  and 15 mg in the evening along with Florinef 0 2 mg daily  · Received 100 mg IV hydrocortisone during ED evaluation given presentation, assess response to this  Appreciate endocrinology evaluation given scenario    Paroxysmal atrial fibrillation   Assessment & Plan  · Not on rate controlling medications at this time due to severe orthostatic hypotension  · Continue Eliquis    History of stroke  Assessment & Plan  · No new focal deficits appreciated, continue ASA, statin, Eliquis    Anemia of chronic disease  Assessment & Plan  · Baseline appearing 8-10 and currently at baseline  Monitor with CBC  Continue iron/B12 supplements additionally    Psychiatric disorder  Assessment & Plan  · Mood stable, continue PTA risperidone and sertraline             VTE Pharmacologic Prophylaxis: VTE Score: 3 Moderate Risk (Score 3-4) - Pharmacological DVT Prophylaxis Ordered: apixaban (Eliquis)  Patient Centered Rounds: I performed bedside rounds with nursing staff today  Discussions with Specialists or Other Care Team Provider: case managment    Education and Discussions with Family / Patient: Updated  (daughter) via phone  Total Time Spent on Date of Encounter in care of patient: 35 minutes This time was spent on one or more of the following: performing physical exam; counseling and coordination of care; obtaining or reviewing history; documenting in the medical record; reviewing/ordering tests, medications or procedures; communicating with other healthcare professionals and discussing with patient's family/caregivers  Current Length of Stay: 1 day(s)  Current Patient Status: Inpatient   Certification Statement: The patient will continue to require additional inpatient hospital stay due to blood sugar management  Discharge Plan: Anticipate discharge in 24-48 hrs to home with home services      Code Status: Level 1 - Full Code    Subjective: States he feels a little better today  Was able to walk around the room today  Objective:     Vitals:   Temp (24hrs), Av 5 °F (36 4 °C), Min:97 2 °F (36 2 °C), Max:98 2 °F (36 8 °C)    Temp:  [97 2 °F (36 2 °C)-98 2 °F (36 8 °C)] 97 2 °F (36 2 °C)  HR:  [70-91] 84  Resp:  [10-19] 10  BP: ()/(53-97) 96/53  SpO2:  [95 %-100 %] 100 %  Body mass index is 31 9 kg/m²  Input and Output Summary (last 24 hours): Intake/Output Summary (Last 24 hours) at 2023 1131  Last data filed at 2023 1100  Gross per 24 hour   Intake 298 ml   Output 1050 ml   Net -752 ml       Physical Exam:   Physical Exam  Vitals and nursing note reviewed  Constitutional:       General: He is not in acute distress  Appearance: He is well-developed  HENT:      Head: Normocephalic and atraumatic  Eyes:      Conjunctiva/sclera: Conjunctivae normal    Cardiovascular:      Rate and Rhythm: Normal rate and regular rhythm  Heart sounds: No murmur heard  Pulmonary:      Effort: Pulmonary effort is normal  No respiratory distress  Breath sounds: Normal breath sounds  Abdominal:      Palpations: Abdomen is soft  Tenderness: There is no abdominal tenderness  Musculoskeletal:         General: No swelling  Cervical back: Neck supple  Skin:     General: Skin is warm and dry  Neurological:      Mental Status: He is alert  Psychiatric:         Mood and Affect: Mood normal  Affect is flat            Additional Data:     Labs:  Results from last 7 days   Lab Units 23  0408 230   WBC Thousand/uL 6 60 7 30   HEMOGLOBIN g/dL 10 6* 9 8*   HEMATOCRIT % 33 8* 30 6*   PLATELETS Thousands/uL 131* 148*   NEUTROS PCT %  --  79*   LYMPHS PCT %  --  10*   MONOS PCT %  --  11   EOS PCT %  --  0     Results from last 7 days   Lab Units 23  0408 23   SODIUM mmol/L 139 139   POTASSIUM mmol/L 4 3 4 8   CHLORIDE mmol/L 113* 115*   CO2 mmol/L 26 24   BUN mg/dL 16 12   CREATININE mg/dL 1 11 1 24   ANION GAP mmol/L 0 0   CALCIUM mg/dL 8 6 8 5   ALBUMIN g/dL  --  3 2*   TOTAL BILIRUBIN mg/dL  --  0 37   ALK PHOS U/L  --  64   ALT U/L  --  19   AST U/L  --  15   GLUCOSE RANDOM mg/dL 225* 103         Results from last 7 days   Lab Units 06/26/23  1102 06/26/23  0551 06/25/23  2220 06/25/23  1900   POC GLUCOSE mg/dl 413* 279* 138 96               Lines/Drains:  Invasive Devices     Peripheral Intravenous Line  Duration           Peripheral IV 04/13/23 Left Forearm 74 days    Peripheral IV 06/25/23 Left <1 day                      Imaging: Reviewed radiology reports from this admission including: chest xray    Recent Cultures (last 7 days):         Last 24 Hours Medication List:   Current Facility-Administered Medications   Medication Dose Route Frequency Provider Last Rate   • acetaminophen  650 mg Oral Q6H PRN Emery Sill, DO     • apixaban  5 mg Oral BID Emeyr Sill, DO     • aspirin  81 mg Oral Daily Emery Sill, DO     • atorvastatin  10 mg Oral Daily Emery Sill, DO     • calcitriol  0 25 mcg Oral Daily Emery Sill, DO     • cyanocobalamin  100 mcg Oral Daily Emery Sill, DO     • ferrous sulfate  325 mg Oral Daily With Breakfast Emery Sill, DO     • fludrocortisone  0 2 mg Oral Daily Emery Sill, DO     • hydrALAZINE  10 mg Intravenous Q6H PRN Emery Sill, DO     • hydrocortisone  15 mg Oral QPM Emery Sill, DO     • hydrocortisone  20 mg Oral Daily Emery Sill, DO     • insulin glargine  20 Units Subcutaneous HS Kelly Aj PA-C     • insulin lispro  1-5 Units Subcutaneous HS Emery Sill, DO     • insulin lispro  1-6 Units Subcutaneous TID AC Emery Sill, DO     • [START ON 6/27/2023] insulin lispro  6 Units Subcutaneous Daily With Breakfast Kelly Aj PA-C     • insulin lispro  7 Units Subcutaneous Daily With Lunch Kelly Aj PA-C     • insulin lispro  7 Units Subcutaneous Daily With Dinner Kelly Aj PA-C     • midodrine  15 mg Oral TID AC  Gosling, DO     • ondansetron  4 mg Intravenous Q6H PRN  Gosling, DO     • potassium chloride  20 mEq Oral BID  Gosling, DO     • risperiDONE  1 mg Oral BID  Gosling, DO     • sertraline  100 mg Oral Daily  Gosling, DO     • sodium chloride  1 g Oral TID  Gosling, DO          Today, Patient Was Seen By: Raheel Cordero PA-C    **Please Note: This note may have been constructed using a voice recognition system  **

## 2023-06-26 NOTE — H&P
[address] [facility]  H&P  Name: Emily Torrez 79 y o  male I MRN: [id number]  Unit/Bed#: ED 02 I Date of Admission: 6/25/2023   Date of Service: 6/25/2023 I Hospital Day: 0      Assessment/Plan   * Type 1 diabetes mellitus with hypoglycemia and without coma [facility])  Assessment & Plan  Lab Results   Component Value Date    HGBA1C 7 4 (H) 01/24/2023       Recent Labs     06/25/23  1900   POCGLU 96       Blood Sugar Average: Last 72 hrs:  (P) 96   · Presented with increased weakness, lightheadedness, and episodes of hypoglycemia over the past 2 days reportedly as low as the 40s per family  · Uncertain etiology for the hypoglycemia episodes, did receive stress dose hydrocortisone during ED evaluation given overall presentation and will assess response to this  · Reduce home long-acting insulin dosing for tonight to 10 units nightly and will resume mealtime insulin once good appetite is demonstrated  Appreciate endocrinology inputs as patient with known history of brittle diabetes and patient known to the practice  · Correctional insulin with Accu-Cheks initiated additionally  · Initiate hypoglycemia protocol    Orthostatic hypotension  Assessment & Plan  · Longstanding history of this, patient following with cardiology and endocrinology and believe related to type 1 diabetes with diabetic autonomic neuropathy and known adrenal insufficiency  · Patient reporting syncopal episode during the past 2 days, unwitnessed  EKG sinus rhythm first-degree AV block however with inferolateral T wave changes somewhat more pronounced than prior and elevated troponin is noted  · Follow-up repeat troponin, if continues to rise will appreciate cardiology consultation    Monitor on telemetry  · Continue Florinef 0 2 mg daily, hydrocortisone as above, midodrine 15 mg 3 times daily pending endocrinology inputs    Psychiatric disorder  Assessment & Plan  · Mood stable, continue PTA risperidone and sertraline    Anemia of chronic disease  Assessment & Plan  · Baseline appearing 8-10 and currently at baseline  Monitor with CBC  Continue iron/B12 supplements additionally    History of stroke  Assessment & Plan  · No new focal deficits appreciated, continue ASA, statin, Eliquis    Paroxysmal atrial fibrillation   Assessment & Plan  · Not on rate controlling medications at this time due to severe orthostatic hypotension  · Continue Eliquis    Adrenal insufficiency   Assessment & Plan  · Follows with endocrinology for this, normally on Cortef 20 mg in a m  and 15 mg in the evening along with Florinef 0 2 mg daily  · Received 100 mg IV hydrocortisone during ED evaluation given presentation, assess response to this  Appreciate endocrinology evaluation given scenario         VTE Prophylaxis: Apixaban (Eliquis)  / sequential compression device   Code Status: Level 1 - Full Code  POLST: POLST form is not discussed and not completed at this time  Discussion with family: Daughter at bedside    Anticipated Length of Stay:  Patient will be admitted on an Inpatient basis with an anticipated length of stay of greater than 2 midnights  Justification for Hospital Stay: Please see detailed plans noted above  Chief Complaint:     Generalized weakness, hypoglycemia  History of Present Illness:  Betti Dubin is a 79 y o  male who has a past medical history stated for 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, adrenal insufficiency, orthostatic hypotension, paroxysmal atrial fibrillation anticoagulated on Eliquis, history of prior CVA presenting with increased generalized weakness and hypoglycemia over past 2-3 days  Much of the history is supplemented by patient's daughter, who is present at bedside    Per daughter, patient was noted with fluctuating blood sugars for the past 2 days to as low as the 40s, additionally noted with confusion/lightheadedness and patient apparently with 1 episode of syncope which was unwitnessed by family and for which patient could not describe the episode  Patient/daughter state they have been taking home insulin as directed, deny any fever/chills, chest pain/pressure, shortness of breath, nausea/running/diarrhea  During the episode of blood glucose to the 40s patient was given glucagon without improvement, thus EMS was called and further glucagon provided by EMS with some improvement; EMS offered transport to the ED however patient declined this  Today, however, he had recurrent low blood sugars and ongoing weakness and pale appearance thus was brought here by family for further evaluation  During ED evaluation blood glucose was noted in the high 90s-low 100s  Lab work-up revealed an elevated troponin to 170, and given presenting scenario patient received stress dose of IV hydrocortisone and is admitted for further management  Review of Systems:    Constitutional:  Denies fever or chills but reported generalized weakness  Eyes:  Denies change in visual acuity   HENT:  Denies nasal congestion or sore throat   Respiratory:  Denies cough or shortness of breath   Cardiovascular:  Denies chest pain or edema   GI:  Denies abdominal pain, nausea, vomiting, bloody stools or diarrhea   :  Denies dysuria   Musculoskeletal:  Denies back pain or joint pain   Integument:  Denies rash   Neurologic:  Denies headache, focal weakness or sensory changes but reported lightheadedness and dizziness  Endocrine:  Denies polyuria or polydipsia   Lymphatic:  Denies swollen glands   Psychiatric:  Denies depression or anxiety     Past Medical and Surgical History:   Past Medical History:   Diagnosis Date   • Diabetes mellitus ([facility] [address] )    • [facility] 11/14/2022     History reviewed  No pertinent surgical history      Meds/Allergies:    Current Facility-Administered Medications:   •  acetaminophen (TYLENOL) tablet 650 mg, 650 mg, Oral, Q6H PRN, Imelda Pulling, DO, 650 mg at 06/25/23 2221  • apixaban (ELIQUIS) tablet 5 mg, 5 mg, Oral, BID, Gwenette Gearing, DO, 5 mg at 06/25/23 2221  •  [START ON 6/26/2023] aspirin chewable tablet 81 mg, 81 mg, Oral, Daily, Gwenette Gearing, DO  •  [START ON 6/26/2023] atorvastatin (LIPITOR) tablet 10 mg, 10 mg, Oral, Daily, Gwenette Gearing, DO  •  [START ON 6/26/2023] calcitriol (ROCALTROL) capsule 0 25 mcg, 0 25 mcg, Oral, Daily, Gwenette Gearing, DO  •  [START ON 6/26/2023] cyanocobalamin (VITAMIN B-12) tablet 100 mcg, 100 mcg, Oral, Daily, Gwenette Gearing, DO  •  [START ON 6/26/2023] ferrous sulfate tablet 325 mg, 325 mg, Oral, Daily With Breakfast, Gwenette Gearing, DO  •  [START ON 6/26/2023] fludrocortisone (FLORINEF) tablet 0 2 mg, 0 2 mg, Oral, Daily, Gwenette Gearing, DO  •  [START ON 6/26/2023] hydrocortisone (CORTEF) tablet 15 mg, 15 mg, Oral, QPM, Gwenette Gearing, DO  •  [START ON 6/26/2023] hydrocortisone (CORTEF) tablet 20 mg, 20 mg, Oral, Daily, Gwenette Gearing, DO  •  insulin glargine (LANTUS) subcutaneous injection 10 Units 0 1 mL, 10 Units, Subcutaneous, HS, Gwenette Gearing, DO, 10 Units at 06/25/23 2221  •  insulin lispro (HumaLOG) 100 units/mL subcutaneous injection 1-5 Units, 1-5 Units, Subcutaneous, HS, Gwenette Gearing, DO  •  [START ON 6/26/2023] insulin lispro (HumaLOG) 100 units/mL subcutaneous injection 1-6 Units, 1-6 Units, Subcutaneous, TID AC **AND** [START ON 6/26/2023] Fingerstick Glucose (POCT), , , TID AC, Gwenette Gearing, DO  •  [START ON 6/26/2023] midodrine (PROAMATINE) tablet 15 mg, 15 mg, Oral, TID AC, Jessica Crump, DO  •  ondansetron (ZOFRAN) injection 4 mg, 4 mg, Intravenous, Q6H PRN, Jessica Crump, DO  •  potassium chloride (K-DUR,KLOR-CON) CR tablet 20 mEq, 20 mEq, Oral, BID, Jessica Crump, DO, 20 mEq at 06/25/23 2221  •  risperiDONE (RisperDAL) tablet 1 mg, 1 mg, Oral, BID, Jessica Crump, DO, 1 mg at 06/25/23 2221  •  [START ON 6/26/2023] sertraline (ZOLOFT) tablet 100 mg, 100 mg, Oral, Daily, Jessica Crump, DO  •  sodium chloride tablet 1 g, 1 g, Oral, TID, Trinh Wagner , 1 g at 06/25/23 2221    Current Outpatient Medications:   •  aspirin 81 mg chewable tablet, Chew 1 tablet (81 mg total) daily, Disp: 30 tablet, Rfl: 0  •  atorvastatin (LIPITOR) 10 mg tablet, Take 1 tablet by mouth daily, Disp: , Rfl:   •  bisacodyl (DULCOLAX) 5 mg EC tablet, Take 2 tablets (10 mg total) by mouth once for 1 dose, Disp: 2 tablet, Rfl: 0  •  calcitriol (ROCALTROL) 0 25 mcg capsule, TAKE 1 CAPSULE BY MOUTH EVERY DAY, Disp: 90 capsule, Rfl: 1  •  Continuous Blood Gluc  (Dexcom G6 ) STEPHEN, Use 1 Units continuous, Disp: 1 each, Rfl: 0  •  Continuous Blood Gluc Sensor (Dexcom G6 Sensor) MISC, Use 1 each every 10 days, Disp: 3 each, Rfl: 2  •  Continuous Blood Gluc Transmit (Dexcom G6 Transmitter) MISC, 1 each every 3 months, Disp: 1 each, Rfl: 2  •  cyanocobalamin (VITAMIN B-12) 100 mcg tablet, Take by mouth daily, Disp: , Rfl:   •  Eliquis 5 MG, TAKE 1 TABLET BY MOUTH TWICE A DAY, Disp: 60 tablet, Rfl: 1  •  ferrous sulfate 324 (65 Fe) mg, Take 1 tablet (324 mg total) by mouth daily before breakfast, Disp: 30 tablet, Rfl: 5  •  fludrocortisone (FLORINEF) 0 1 mg tablet, TAKE 2 TABLETS BY MOUTH DAILY  , Disp: 60 tablet, Rfl: 2  •  hydrocortisone (CORTEF) 5 mg tablet, TAKE 20MG(4 TABS) ON WAKING UP AND 15MG(3 TABS) IN EVENING, Disp: 180 tablet, Rfl: 1  •  hydrocortisone (CORTEF) 5 mg tablet, Take 20mg(4 tabs) on waking up and  15mg(3 tabs) in evening, Disp: 180 tablet, Rfl: 1  •  insulin glargine (Toujeo SoloStar) 300 units/mL CONCENTRATED U-300 injection pen (1-unit dial), Inject 20 Units under the skin daily at bedtime, Disp: 15 mL, Rfl: 0  •  insulin lispro (HumaLOG) 100 units/mL injection pen, Use 6 units of breakfast, 7 units with lunch and 7 units with dinner +1 unit per 50 above 150 mg/dL; maximum daily dose 40 units, Disp: 15 mL, Rfl: 2  •  Insulin Pen Needle (BD Pen Needle Blessing 2nd Gen) 32G X 4 MM MISC, For use with insulin pen   Pharmacy may dispense brand covered by insurance , Disp: 100 each, Rfl: 0  •  Insulin Pen Needle (BD Pen Needle Blessing 2nd Gen) 32G X 4 MM MISC, For use with insulin pen  Pharmacy may dispense brand covered by insurance , Disp: 100 each, Rfl: 3  •  Lantus SoloStar 100 units/mL SOPN, , Disp: , Rfl:   •  midodrine (PROAMATINE) 10 MG tablet, TAKE 1 AND 1/2 TABLETS (15 MG TOTAL) BY MOUTH 3 (THREE) TIMES A DAY BEFORE MEALS, Disp: 405 tablet, Rfl: 4  •  polyethylene glycol (GOLYTELY) 4000 mL solution, Take 4,000 mL by mouth once for 1 dose, Disp: 4000 mL, Rfl: 0  •  potassium chloride (Klor-Con) 10 mEq tablet, Take 20 mEq by mouth 2 (two) times a day, Disp: , Rfl:   •  risperiDONE (RisperDAL) 1 mg tablet, Take 1 tablet by mouth 2 (two) times a day, Disp: , Rfl:   •  sertraline (ZOLOFT) 100 mg tablet, Take 100 mg by mouth daily, Disp: , Rfl:   •  sodium chloride 1 g tablet, Take 1 tablet (1 g total) by mouth 3 (three) times a day, Disp: 270 tablet, Rfl: 3  •  sodium chloride, concentrated, 2 5 MEQ/ML, Take 1 g by mouth Three times a day, Disp: , Rfl:     Allergies:    Allergies   Allergen Reactions   • Imipramine Other (See Comments)   • Lisinopril Other (See Comments)   • Paroxetine Other (See Comments)   • Penicillins Hives   • Rosiglitazone Other (See Comments)   • Troglitazone Other (See Comments)     History:  Marital Status: Single     Substance Use History:   Social History     Substance and Sexual Activity   Alcohol Use Not Currently   • Alcohol/week: 5 0 standard drinks of alcohol   • Types: 5 Cans of beer per week    Comment: Quit in august     Social History     Tobacco Use   Smoking Status Former   • Packs/day: 0 25   • Years: 30 00   • Total pack years: 7 50   • Types: Cigarettes   Smokeless Tobacco Never     Social History     Substance and Sexual Activity   Drug Use Never       Family History:  Family History   Problem Relation Age of Onset   • Heart disease Mother    • Cancer Father    • Multiple sclerosis Sister Physical Exam:     Vitals:   Blood Pressure: (!) 184/81 (06/25/23 2200)  Pulse: 78 (06/25/23 2200)  Temperature: 98 2 °F (36 8 °C) (06/25/23 1902)  Temp Source: Oral (06/25/23 1902)  Respirations: 16 (06/25/23 2200)  SpO2: 97 % (06/25/23 2200)    Constitutional:  Well developed, well nourished, no acute distress, non-toxic appearance   Eyes:  PERRL, conjunctiva normal   HENT:  Atraumatic, external ears normal, nose normal, oropharynx moist, no pharyngeal exudates  Neck- normal range of motion, no tenderness, supple   Respiratory:  No respiratory distress, normal breath sounds, no rales, no wheezing   Cardiovascular:  Normal rate, normal rhythm, no murmurs, no gallops, no rubs   GI:  Soft, nondistended, normal bowel sounds, nontender, no organomegaly, no mass, no rebound, no guarding   :  No costovertebral angle tenderness   Musculoskeletal:  No edema, no tenderness, no deformities  Back- no tenderness  Integument:  Well hydrated, no rash, pale appearing  Lymphatic:  No lymphadenopathy noted   Neurologic:  Alert &awake, communicative, CN 2-12 normal, normal motor function, normal sensory function, no focal deficits noted   Psychiatric:  Speech and behavior appropriate       Lab Results: I have personally reviewed pertinent reports  Results from last 7 days   Lab Units 06/25/23 1930   WBC Thousand/uL 7 30   HEMOGLOBIN g/dL 9 8*   HEMATOCRIT % 30 6*   PLATELETS Thousands/uL 148*   NEUTROS PCT % 79*   LYMPHS PCT % 10*   MONOS PCT % 11   EOS PCT % 0     Results from last 7 days   Lab Units 06/25/23 1930   POTASSIUM mmol/L 4 8   CHLORIDE mmol/L 115*   CO2 mmol/L 24   BUN mg/dL 12   CREATININE mg/dL 1 24   CALCIUM mg/dL 8 5   ALK PHOS U/L 64   ALT U/L 19   AST U/L 15           EKG: Sinus rhythm with first-degree AV block HR 85, T wave abnormality in inferolateral leads    Imaging: I have personally reviewed pertinent films in PACS    CXR: Personally reviewed, no acute cardiopulmonary disease visualized  Final radiologist read is pending  ** Please Note: Dragon 360 Dictation voice to text software was used in the creation of this document   **

## 2023-06-26 NOTE — UTILIZATION REVIEW
Initial Clinical Review    Admission: Date/Time/Statement:   Admission Orders (From admission, onward)     Ordered        06/25/23 2051  INPATIENT ADMISSION  Once                      Orders Placed This Encounter   Procedures   • INPATIENT ADMISSION     Standing Status:   Standing     Number of Occurrences:   1     Order Specific Question:   Level of Care     Answer:   Med Surg [16]     Order Specific Question:   Estimated length of stay     Answer:   More than 2 Midnights     Order Specific Question:   Certification     Answer:   I certify that inpatient services are medically necessary for this patient for a duration of greater than two midnights  See H&P and MD Progress Notes for additional information about the patient's course of treatment  ED Arrival Information     Expected   -    Arrival   6/25/2023 18:57    Acuity   Emergent            Means of arrival   Walk-In    Escorted by   Family Member    Service   Hospitalist    Admission type   Emergency            Arrival complaint   Weakness           Chief Complaint   Patient presents with   • Hypoglycemia - Symptomatic     Per pt and daughter, pt has had fluctuation blood sugars x 24h, at home bg read 113, currently 96  Last night pt got as low as in the 40s, but refused to come to the er when ems arrived to their house  Pt aao x3 at this time, states he has a headache  Initial Presentation: 79 y o  male , presented to the ED @ [facility], from home via walk in with family member  Admitted as Inpatient due to Type 1 Diabetes Mellitus with Hypoglycemia and without Coma  Past medical history stated for 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, adrenal insufficiency, orthostatic hypotension, paroxysmal atrial fibrillation anticoagulated on Eliquis, history of prior CVA   Date: 06/25/2023   Presenting with increased generalized weakness and hypoglycemia over past 2-3 days      Much of the history is supplemented by patient's daughter, who is present at bedside  Per daughter, patient was noted with fluctuating blood sugars for the past 2 days to as low as the 40s, additionally noted with confusion/lightheadedness and patient apparently with 1 episode of syncope which was unwitnessed by family and for which patient could not describe the episode  Patient/daughter state they have been taking home insulin as directed, deny any fever/chills, chest pain/pressure, shortness of breath, nausea/running/diarrhea  During the episode of blood glucose to the 40s patient was given glucagon without improvement, thus EMS was called and further glucagon provided by EMS with some improvement; EMS offered transport to the ED however patient declined this  Today, however, he had recurrent low blood sugars and ongoing weakness and pale appearance thus was brought here by family for further evaluation    During ED evaluation blood glucose was noted in the high 90s-low 100s  Lab work-up revealed an elevated troponin to 170, and given presenting scenario patient received stress dose of IV hydrocortisone and is admitted for further management  Day 2: 06/26/2023    Blood sugar in 400's this afternoon, restart mealtime insulin and increase Lantus back to 20 units  Continue meds for Orthostasis  06/26/2023  Consult Cardio:    1  Abnormal EKG: Noted to have slightly more prominent inferolateral T wave inversions on EKG  Patient denies angina or anginal equivalent  troponin 170--> 147--> 134  Negative nuclear stress test in 8/2022 from [facility]  2   PAF: Currently maintaining NSR  • [id number] 5: Maintained on Eliquis 5 mg BID  3  Preserved biventricular systolic function: EF 08% with no WMA, G1DD, normal RV function, mild LA dilatation, no significant valvular abnormality on echocardiogram 1/9/2023    4   Orthostatic hypotension: Maintained on midodrine 15 mg TID, Florinef 0 2 mg daily, and  hydrocortisone 20 mg am and 15 mg pm   Uses abdominal binder and compression stockings  5   Hypoglycemia with type I DM: Has had fluctuating blood sugars dropping to the 40s with associated lightheadedness and confusion  Blood sugar 96 POA  Management per primary team   6   History of CVA: Maintained on aspirin and statin  7  Chronic anemia: Baseline hemoglobin 9-10  Hemoglobin currently at baseline  8   Renal insufficiency   Plan/Recommendations:  • No evidence of ACS  • Continue current medications for orthostatic hypotension  • Continue abdominal binder and compression stockings    06/26/2023  Consult Endocrine:  T1DM and adrenal insufficiency  Plan:   1  Type 1 diabetes, uncontrolled with hyper and hypoglycemia: Known to have highly variable BGs  Received only 10units of Lantus overnight, but ordered for his outpatient regimen of Lantus 20units once daily and Humalog 7/6/6  Has a correctional scale ordered  Will not adjust at this time but may need more depending on response to increased steroids    2  Adrenal insufficiency: Received home steroids, but will double for 24h in case there is another stressor precipitating current situation  In creased to [facility] [place]  40mg qAM and 30mg qpm      Date: 06/27/2023  Hospital Day 3: Has surpassed a 2nd midnight with active treatments and services, which include Monitor & trend fingerstick glucose  Continue hydrocortisone  Continue cardio-Pulmonary monitoring        ED Triage Vitals   Temperature Pulse Respirations Blood Pressure SpO2   06/25/23 1902 06/25/23 1902 06/25/23 1902 06/25/23 1902 06/25/23 1902   98 2 °F (36 8 °C) 84 18 113/56 96 %      Temp Source Heart Rate Source Patient Position - Orthostatic VS BP Location FiO2 (%)   06/25/23 1902 06/25/23 1902 06/25/23 2100 06/25/23 2100 --   Oral Monitor Lying Right arm       Pain Score       06/25/23 1902       6          Wt Readings from Last 1 Encounters:   06/26/23 84 3 kg (185 lb 13 6 oz)     Additional Vital Signs:   Date/Time Temp Pulse Resp BP MAP (mmHg) SpO2 O2 Device Patient Position - Orthostatic VS   06/26/23 14:51:51 96 9 °F (36 1 °C) Abnormal  85 14 171/95 Abnormal  120 99 % -- --   06/26/23 08:11:35 -- 84 10 Abnormal  96/53 67 100 % -- Standing - Orthostatic VS   06/26/23 08:11:02 97 2 °F (36 2 °C) Abnormal  84 12 96/53 67 100 % -- Sitting - Orthostatic VS   06/26/23 08:09:44 97 2 °F (36 2 °C) Abnormal  91 18 136/66 89 99 % -- Lying - Orthostatic VS   06/26/23 08:07:51 97 2 °F (36 2 °C) Abnormal  88 14 138/67 91 100 % -- --   06/26/23 0801 -- -- -- -- -- -- -- Sitting - Orthostatic VS   06/26/23 0800 -- -- -- -- -- -- None (Room air) Lying - Orthostatic VS   06/26/23 0530 -- -- -- -- -- -- None (Room air) --   06/26/23 05:17:08 97 5 °F (36 4 °C) 89 16 134/72 93 98 % None (Room air) --   06/26/23 0230 -- 72 18 135/75 99 98 % None (Room air) Lying   06/26/23 0200 -- 80 16 172/85 Abnormal  119 99 % None (Room air) Lying   06/26/23 0100 -- 74 18 202/93 Abnormal  134 95 % None (Room air) Lying   06/26/23 0000 -- 70 16 155/74 107 98 % None (Room air) Lying   06/25/23 2300 -- 78 14 199/97 Abnormal  135 -- -- Lying   06/25/23 2200 -- 78 16 184/81 Abnormal  116 97 % None (Room air) Lying   06/25/23 2100 -- 74 19 142/65 93 96 % None (Room air) Lying     Date and Time Eye Opening Best Verbal Response Best Motor Response [place] Coma Scale Score   06/26/23 0800 4 5 6 15   06/26/23 0530 4 5 6 15   06/25/23 1915 4 5 6 15           Pertinent Labs/Diagnostic Test Results:   XR chest 1 view portable   Final Result by Kwan Delaney MD (06/26 0110)      No acute cardiopulmonary disease          Results from last 7 days   Lab Units 06/26/23  0408 06/25/23  1930   WBC Thousand/uL 6 60 7 30   HEMOGLOBIN g/dL 10 6* 9 8*   HEMATOCRIT % 33 8* 30 6*   PLATELETS Thousands/uL 131* 148*   NEUTROS ABS Thousands/µL  --  5 64     Results from last 7 days   Lab Units 06/26/23  0408 06/25/23 1930   SODIUM mmol/L 139 139   POTASSIUM mmol/L 4 3 4 8   CHLORIDE mmol/L 113* 115*   CO2 mmol/L 26 24 ANION GAP mmol/L 0 0   BUN mg/dL 16 12   CREATININE mg/dL 1 11 1 24   EGFR ml/min/1 73sq m 68 60   CALCIUM mg/dL 8 6 8 5     Results from last 7 days   Lab Units 06/25/23  1930   AST U/L 15   ALT U/L 19   ALK PHOS U/L 64   TOTAL PROTEIN g/dL 6 2*   ALBUMIN g/dL 3 2*   TOTAL BILIRUBIN mg/dL 0 37     Results from last 7 days   Lab Units 06/26/23  1102 06/26/23  0551 06/25/23  2220 06/25/23  1900   POC GLUCOSE mg/dl 413* 279* 138 96     Results from last 7 days   Lab Units 06/26/23  0408 06/25/23  1930   GLUCOSE RANDOM mg/dL 225* 103     Results from last 7 days   Lab Units 06/25/23  2334 06/25/23  2138 06/25/23  1930   HS TNI 0HR ng/L  --   --  170*   HS TNI 2HR ng/L  --  147*  --    HSTNI D2 ng/L  --  -23  --    HS TNI 4HR ng/L 134*  --   --    HSTNI D4 ng/L -36  --   --      Results from last 7 days   Lab Units 06/25/23  1930   TSH 3RD GENERATON uIU/mL 0 779     ED Treatment:   Medication Administration from 06/25/2023 1857 to 06/26/2023 0511       Date/Time Order Dose Route Action     06/25/2023 2045 EDT hydrocortisone (Solu-CORTEF) injection 100 mg 100 mg Intravenous Given     06/25/2023 2110 EDT aspirin chewable tablet 162 mg 162 mg Oral Given     06/25/2023 2221 EDT apixaban (ELIQUIS) tablet 5 mg 5 mg Oral Given     06/25/2023 2221 EDT insulin glargine (LANTUS) subcutaneous injection 10 Units 0 1 mL 10 Units Subcutaneous Given     06/25/2023 2221 EDT potassium chloride (K-DUR,KLOR-CON) CR tablet 20 mEq 20 mEq Oral Given     06/25/2023 2221 EDT risperiDONE (RisperDAL) tablet 1 mg 1 mg Oral Given     06/25/2023 2221 EDT sodium chloride tablet 1 g 1 g Oral Given     06/25/2023 2221 EDT acetaminophen (TYLENOL) tablet 650 mg 650 mg Oral Given     06/26/2023 0153 EDT hydrALAZINE (APRESOLINE) injection 10 mg 10 mg Intravenous Given        Past Medical History:   Diagnosis Date   • Diabetes mellitus ([email or web address] 75 )    • Obesity, morbid ([email or web address] 75 ) 11/14/2022     Present on Admission:  • Type 1 diabetes mellitus with hypoglycemia and without coma ([facility] [place] [address] )  • Orthostatic hypotension  • Adrenal insufficiency   • Paroxysmal atrial fibrillation   • Psychiatric disorder  • Anemia of chronic disease      Admitting Diagnosis: Orthostatic hypotension [I95 1]  Lightheadedness [R42]  Adrenal insufficiency (HCC) [E27 40]  Hypoglycemia [E16 2]  Elevated troponin [R77 8]  Type 1 diabetes mellitus with hyperglycemia (HCC) [E10 65]  Age/Sex: 79 y o  male  Admission Orders:  Abner/CHO controlled diet  Up & OOB as tolerated  Abdulkadir SCDs    Scheduled Medications:  apixaban, 5 mg, Oral, BID  aspirin, 81 mg, Oral, Daily  atorvastatin, 10 mg, Oral, Daily  calcitriol, 0 25 mcg, Oral, Daily  cyanocobalamin, 100 mcg, Oral, Daily  ferrous sulfate, 325 mg, Oral, Daily With Breakfast  fludrocortisone, 0 2 mg, Oral, Daily  hydrocortisone, 15 mg, Oral, QPM  hydrocortisone, 20 mg, Oral, Daily  insulin glargine, 20 Units, Subcutaneous, HS  insulin lispro, 1-5 Units, Subcutaneous, HS  insulin lispro, 1-6 Units, Subcutaneous, TID AC  [START ON 6/27/2023] insulin lispro, 6 Units, Subcutaneous, Daily With Breakfast  insulin lispro, 7 Units, Subcutaneous, Daily With Lunch  insulin lispro, 7 Units, Subcutaneous, Daily With Dinner  midodrine, 15 mg, Oral, TID AC  potassium chloride, 20 mEq, Oral, BID  risperiDONE, 1 mg, Oral, BID  sertraline, 100 mg, Oral, Daily  sodium chloride, 1 g, Oral, TID      Continuous IV Infusions:     PRN Meds:  acetaminophen, 650 mg, Oral, Q6H PRN  hydrALAZINE, 10 mg, Intravenous, Q6H PRN  ondansetron, 4 mg, Intravenous, Q6H PRN        IP CONSULT TO ENDOCRINOLOGY  IP CONSULT TO CARDIOLOGY    Network Utilization Review Department  ATTENTION: Please call with any questions or concerns to [phone] and carefully listen to the prompts so that you are directed to the right person   All voicemails are confidential   Mai Covarrubias all requests for admission clinical reviews, approved or denied determinations and any other requests to dedicated fax number below belonging to the [place] where the patient is receiving treatment   List of dedicated fax numbers for the Facilities:  [address] [place] [address] DENIALS (Administrative/Medical Necessity) [phone]   [address]  (Maternity/NICU/Pediatrics) [phone] [address] [phone]   [address] [phone]   [address] [place] [address] [facility] [phone]   [address] [facility] 134 815 [facility] [phone]

## 2023-06-27 ENCOUNTER — HOME HEALTH ADMISSION (OUTPATIENT)
Dept: HOME HEALTH SERVICES | Facility: HOME HEALTHCARE | Age: 68
End: 2023-06-27
Payer: COMMERCIAL

## 2023-06-27 VITALS
HEART RATE: 83 BPM | HEIGHT: 64 IN | DIASTOLIC BLOOD PRESSURE: 86 MMHG | OXYGEN SATURATION: 99 % | RESPIRATION RATE: 16 BRPM | WEIGHT: 185.85 LBS | SYSTOLIC BLOOD PRESSURE: 168 MMHG | TEMPERATURE: 98.1 F | BODY MASS INDEX: 31.73 KG/M2

## 2023-06-27 LAB
ANION GAP SERPL CALCULATED.3IONS-SCNC: 2 MMOL/L
BUN SERPL-MCNC: 18 MG/DL (ref 5–25)
CALCIUM SERPL-MCNC: 8.9 MG/DL (ref 8.3–10.1)
CHLORIDE SERPL-SCNC: 113 MMOL/L (ref 96–108)
CO2 SERPL-SCNC: 26 MMOL/L (ref 21–32)
CREAT SERPL-MCNC: 0.96 MG/DL (ref 0.6–1.3)
GFR SERPL CREATININE-BSD FRML MDRD: 82 ML/MIN/1.73SQ M
GLUCOSE SERPL-MCNC: 111 MG/DL (ref 65–140)
GLUCOSE SERPL-MCNC: 121 MG/DL (ref 65–140)
GLUCOSE SERPL-MCNC: 130 MG/DL (ref 65–140)
GLUCOSE SERPL-MCNC: 192 MG/DL (ref 65–140)
GLUCOSE SERPL-MCNC: 200 MG/DL (ref 65–140)
POTASSIUM SERPL-SCNC: 4 MMOL/L (ref 3.5–5.3)
SODIUM SERPL-SCNC: 141 MMOL/L (ref 135–147)

## 2023-06-27 PROCEDURE — 82948 REAGENT STRIP/BLOOD GLUCOSE: CPT

## 2023-06-27 PROCEDURE — 80048 BASIC METABOLIC PNL TOTAL CA: CPT | Performed by: PHYSICIAN ASSISTANT

## 2023-06-27 PROCEDURE — 99239 HOSP IP/OBS DSCHRG MGMT >30: CPT | Performed by: PHYSICIAN ASSISTANT

## 2023-06-27 RX ORDER — INSULIN GLARGINE 100 [IU]/ML
16 INJECTION, SOLUTION SUBCUTANEOUS
Status: DISCONTINUED | OUTPATIENT
Start: 2023-06-27 | End: 2023-06-27 | Stop reason: HOSPADM

## 2023-06-27 RX ORDER — INSULIN GLARGINE 300 U/ML
16 INJECTION, SOLUTION SUBCUTANEOUS
Qty: 15 ML | Refills: 0
Start: 2023-06-27

## 2023-06-27 RX ADMIN — CALCITRIOL CAPSULES 0.25 MCG 0.25 MCG: 0.25 CAPSULE ORAL at 08:07

## 2023-06-27 RX ADMIN — SERTRALINE HYDROCHLORIDE 100 MG: 100 TABLET ORAL at 08:07

## 2023-06-27 RX ADMIN — VITAM B12 100 MCG: 100 TAB at 08:08

## 2023-06-27 RX ADMIN — INSULIN LISPRO 6 UNITS: 100 INJECTION, SOLUTION INTRAVENOUS; SUBCUTANEOUS at 07:37

## 2023-06-27 RX ADMIN — INSULIN LISPRO 2 UNITS: 100 INJECTION, SOLUTION INTRAVENOUS; SUBCUTANEOUS at 10:58

## 2023-06-27 RX ADMIN — ATORVASTATIN CALCIUM 10 MG: 10 TABLET, FILM COATED ORAL at 08:08

## 2023-06-27 RX ADMIN — RISPERIDONE 1 MG: 1 TABLET ORAL at 08:08

## 2023-06-27 RX ADMIN — ASPIRIN 81 MG CHEWABLE TABLET 81 MG: 81 TABLET CHEWABLE at 08:07

## 2023-06-27 RX ADMIN — INSULIN LISPRO 7 UNITS: 100 INJECTION, SOLUTION INTRAVENOUS; SUBCUTANEOUS at 11:45

## 2023-06-27 RX ADMIN — MIDODRINE HYDROCHLORIDE 15 MG: 5 TABLET ORAL at 06:34

## 2023-06-27 RX ADMIN — FLUDROCORTISONE ACETATE 0.2 MG: 0.1 TABLET ORAL at 08:10

## 2023-06-27 RX ADMIN — SODIUM CHLORIDE 1 G: 1 TABLET ORAL at 08:08

## 2023-06-27 RX ADMIN — MIDODRINE HYDROCHLORIDE 15 MG: 5 TABLET ORAL at 10:55

## 2023-06-27 RX ADMIN — SODIUM CHLORIDE 1 G: 1 TABLET ORAL at 15:37

## 2023-06-27 RX ADMIN — FERROUS SULFATE TAB 325 MG (65 MG ELEMENTAL FE) 325 MG: 325 (65 FE) TAB at 08:07

## 2023-06-27 RX ADMIN — POTASSIUM CHLORIDE 20 MEQ: 750 TABLET, EXTENDED RELEASE ORAL at 08:07

## 2023-06-27 RX ADMIN — HYDROCORTISONE 40 MG: 20 TABLET ORAL at 08:10

## 2023-06-27 RX ADMIN — MIDODRINE HYDROCHLORIDE 15 MG: 5 TABLET ORAL at 15:36

## 2023-06-27 RX ADMIN — INSULIN LISPRO 2 UNITS: 100 INJECTION, SOLUTION INTRAVENOUS; SUBCUTANEOUS at 15:38

## 2023-06-27 RX ADMIN — APIXABAN 5 MG: 5 TABLET, FILM COATED ORAL at 08:08

## 2023-06-27 NOTE — QUICK NOTE
Contact by SLIM re: medications for d/c  Recommend resuming home hydrocortisone 20mg qAM and 15mg afternoon and lowering Lantus to 16units

## 2023-06-27 NOTE — UTILIZATION REVIEW
"NOTIFICATION OF INPATIENT ADMISSION   AUTHORIZATION REQUEST   SERVICING FACILITY:   [facility]  Address: [address] [place] [address], [address] [place] [address]  Tax ID: [id number]  NPI: [id number] ATTENDING PROVIDER:  Attending Name and NPI#: Sal Carrel [[id number]]  Address: [address] [place] [address]  Phone: [phone]   ADMISSION INFORMATION:  [facility]patient [facility]  Place of Service Code: 21  Inpatient Admission Date/Time: 6/25/23  8:51 PM  Discharge Date/Time: No discharge date for patient encounter  Admitting Diagnosis Code/Description:  Orthostatic hypotension [I95 1]  Lightheadedness [R42]  Adrenal insufficiency ([place] [address] ) [E27 40]  Hypoglycemia [E16 2]  Elevated troponin [R77 8]  Type 1 diabetes mellitus with hyperglycemia ([facility] [address] ) [E10 65]     UTILIZATION REVIEW CONTACT:  Lorena Carey, Utilization   Network Utilization Review Department  Phone: [phone]  Fax: [phone]  Email: [email or web address]  Contact for approvals/pending authorizations, clinical reviews, and discharge  PHYSICIAN ADVISORY SERVICES:  Medical Necessity Denial & [facility] Review  Phone: [phone]  Fax: [phone]  Email: [email or web address]         "

## 2023-06-27 NOTE — ASSESSMENT & PLAN NOTE
Add 41392 Cpt? (Important Note: In 2017 The Use Of 82940 Is Being Tracked By Cms To Determine Future Global Period Reimbursement For Global Periods): no
Lab Results   Component Value Date    HGBA1C 7 4 (H) 01/24/2023       Recent Labs     06/25/23  1900   POCGLU 96       Blood Sugar Average: Last 72 hrs:  (P) 96   · Presented with increased weakness, lightheadedness, and episodes of hypoglycemia over the past 2 days reportedly as low as the 40s per family  · Uncertain etiology for the hypoglycemia episodes, did receive stress dose hydrocortisone during ED evaluation given overall presentation and will assess response to this  · Reduce home long-acting insulin dosing for tonight to 10 units nightly and will resume mealtime insulin once good appetite is demonstrated    Appreciate endocrinology inputs as patient with known history of brittle diabetes and patient known to the practice  · Correctional insulin with Accu-Cheks initiated additionally  · Initiate hypoglycemia protocol
Lab Results   Component Value Date    HGBA1C 7 4 (H) 01/24/2023       Recent Labs     06/25/23  1900 06/25/23  2220 06/26/23  0551 06/26/23  1102   POCGLU 96 138 279* 413*       Blood Sugar Average: Last 72 hrs:  (P) 231 5   · Presented with increased weakness, lightheadedness, and episodes of hypoglycemia over the past 2 days reportedly as low as the 40s per family  · Uncertain etiology for the hypoglycemia episodes, did receive stress dose hydrocortisone during ED evaluation given overall presentation  · Reduce home long-acting insulin dosing for tonight to 10 units nightly and will resume mealtime insulin once good appetite is demonstrated  Blood sugar in 400's this afternoon, restart mealtime insulin and increase Lantus back to 20 units until seen by Endocrinology     · Appreciate endocrinology inputs as patient with known history of brittle diabetes and patient known to the practice  · Correctional insulin with Accu-Cheks initiated additionally  · Initiate hypoglycemia protocol
Lab Results   Component Value Date    HGBA1C 9 0 (H) 06/26/2023       Recent Labs     06/26/23  1538 06/26/23  2045 06/27/23  0542 06/27/23  0736   POCGLU 355* 181* 111 121       Blood Sugar Average: Last 72 hrs:  (P) 211 75   · Presented with increased weakness, lightheadedness, and episodes of hypoglycemia over the past 2 days reportedly as low as the 40s per family  · Uncertain etiology for the hypoglycemia episodes, did receive stress dose hydrocortisone during ED evaluation given overall presentation  · Reduced home long-acting insulin dosing for tonight to 10 units nightly and will resume mealtime insulin once good appetite is demonstrated  Blood sugar in 400's afternoon 6/26, restarted mealtime insulin and increase Lantus back to 20 units until seen by Endocrinology     · Appreciate endocrinology inputs as patient with known history of brittle diabetes and patient known to the practice  · Discharge plan is for Lantus 16 units at bedtime with mealtime insulin 7/6/6  · Outpatient follow up with Dr Cha Weiss
· Baseline appearing 8-10 and currently at baseline  Monitor with CBC    Continue iron/B12 supplements additionally
· Follows with endocrinology for this, normally on Cortef 20 mg in a m  and 15 mg in the evening along with Florinef 0 2 mg daily  · Received 100 mg IV hydrocortisone during ED evaluation given presentation, assess response to this    Appreciate endocrinology evaluation given scenario
· Follows with endocrinology for this, normally on Cortef 20 mg in a m  and 15 mg in the evening along with Florinef 0 2 mg daily  · Received 100 mg IV hydrocortisone during ED evaluation given presentation, assess response to this    Appreciate endocrinology evaluation given scenario
· Follows with endocrinology for this, normally on Cortef 20 mg in a m  and 15 mg in the evening along with Florinef 0 2 mg daily  · Received 100 mg IV hydrocortisone during ED evaluation given presentation, assess response to this  Appreciate endocrinology evaluation given scenario  Steroids were increased for 24 hours  No signs of active infection or other stressor   Discharge on home dose of steroid  · Outpatient follow up with Endocrinology
· Longstanding history of this, patient following with cardiology and endocrinology and believe related to type 1 diabetes with diabetic autonomic neuropathy and known adrenal insufficiency  · Patient reporting syncopal episode during the past 2 days, unwitnessed  EKG without acute ischemic changes however elevated troponin is noted  · Cardiology consult appreciated  Doubt ACS   Continue meds for orthostasis  · Continue Florinef 0 2 mg daily, hydrocortisone as above, midodrine 15 mg 3 times daily pending endocrinology inputs
· Longstanding history of this, patient following with cardiology and endocrinology and believe related to type 1 diabetes with diabetic autonomic neuropathy and known adrenal insufficiency  · Patient reporting syncopal episode during the past 2 days, unwitnessed  EKG without acute ischemic changes however elevated troponin is noted  · Cardiology consult appreciated  Doubt ACS  Continue meds for orthostasis  · Continue Florinef 0 2 mg daily, hydrocortisone as above, midodrine 15 mg 3 times daily   · Endocrinology input appreciated  He received stress dose steroid in ED and his Cortef was increased temporarily  · Patient now doing well with no complaints and no signs or symptoms of infection  Plan is to resume home steroid dose at discharge 
· Longstanding history of this, patient following with cardiology and endocrinology and believe related to type 1 diabetes with diabetic autonomic neuropathy and known adrenal insufficiency  · Patient reporting syncopal episode during the past 2 days, unwitnessed  EKG without acute ischemic changes however elevated troponin is noted  · Follow-up repeat troponin, if continues to rise will appreciate cardiology consultation    Monitor on telemetry  · Continue Florinef 0 2 mg daily, hydrocortisone as above, midodrine 15 mg 3 times daily pending endocrinology inputs
· Mood stable, continue PTA risperidone and sertraline
· No new focal deficits appreciated, continue ASA, statin, Eliquis
· Not on rate controlling medications at this time due to severe orthostatic hypotension  · Continue Eliquis
Detail Level: Detailed

## 2023-06-27 NOTE — DISCHARGE SUMMARY
[address] [facility]  [facility] 1955, 79 y [facility]  male MRN: [id number]  Unit/Bed#: [facility] 431-01 Encounter: [id number]  Primary Care Provider: Steff Tolliver MD   Date and time admitted to hospital: 6/25/2023  7:08 PM    * Type 1 diabetes mellitus with hypoglycemia and without coma [facility])  Assessment & Plan  Lab Results   Component Value Date    HGBA1C 9 0 (H) 06/26/2023       Recent Labs     06/26/23  1538 06/26/23  2045 06/27/23  0542 06/27/23  0736   POCGLU 355* 181* 111 121       Blood Sugar Average: Last 72 hrs:  (P) 211 75   · Presented with increased weakness, lightheadedness, and episodes of hypoglycemia over the past 2 days reportedly as low as the 40s per family  · Uncertain etiology for the hypoglycemia episodes, did receive stress dose hydrocortisone during ED evaluation given overall presentation  · Reduced home long-acting insulin dosing for tonight to 10 units nightly and will resume mealtime insulin once good appetite is demonstrated  Blood sugar in 400's afternoon 6/26, restarted mealtime insulin and increase Lantus back to 20 units until seen by Endocrinology  · Appreciate endocrinology inputs as patient with known history of brittle diabetes and patient known to the practice  · Discharge plan is for Lantus 16 units at bedtime with mealtime insulin 7/6/6  · Outpatient follow up with Dr Luciana Carrasco    Orthostatic hypotension  Assessment & Plan  · Longstanding history of this, patient following with cardiology and endocrinology and believe related to type 1 diabetes with diabetic autonomic neuropathy and known adrenal insufficiency  · Patient reporting syncopal episode during the past 2 days, unwitnessed  EKG without acute ischemic changes however elevated troponin is noted  · Cardiology consult appreciated  Doubt ACS   Continue meds for orthostasis  · Continue Florinef 0 2 mg daily, hydrocortisone as above, midodrine 15 mg 3 times daily   · Endocrinology input appreciated  He received stress dose steroid in ED and his Cortef was increased temporarily  · Patient now doing well with no complaints and no signs or symptoms of infection  Plan is to resume home steroid dose at discharge  Adrenal insufficiency   Assessment & Plan  · Follows with endocrinology for this, normally on Cortef 20 mg in a m  and 15 mg in the evening along with Florinef 0 2 mg daily  · Received 100 mg IV hydrocortisone during ED evaluation given presentation, assess response to this  Appreciate endocrinology evaluation given scenario  Steroids were increased for 24 hours  No signs of active infection or other stressor  Discharge on home dose of steroid  · Outpatient follow up with Endocrinology    Paroxysmal atrial fibrillation   Assessment & Plan  · Not on rate controlling medications at this time due to severe orthostatic hypotension  · Continue Eliquis    History of stroke  Assessment & Plan  · No new focal deficits appreciated, continue ASA, statin, Eliquis    Anemia of chronic disease  Assessment & Plan  · Baseline appearing 8-10 and currently at baseline  Monitor with CBC  Continue iron/B12 supplements additionally    Psychiatric disorder  Assessment & Plan  · Mood stable, continue PTA risperidone and sertraline      Medical Problems     Resolved Problems  Date Reviewed: 6/27/2023   None       Discharging Physician / Practitioner: Leonidas Pepe PA-C  PCP: Catherine Garcia MD  Admission Date:   Admission Orders (From admission, onward)     Ordered        06/25/23 2051  INPATIENT ADMISSION  Once                      Discharge Date: 06/27/23    Consultations During Hospital Stay:  · Endocrinology  · Cardiology    Procedures Performed:     CXR  No acute cardiopulmonary disease      Significant Findings / Test Results:   · none    Incidental Findings:   · none     Test Results Pending at Discharge (will require follow up):   · none     Outpatient Tests Requested:  · none    Complications: "none    Reason for Admission: hypoglycemia and syncope    Hospital Course:   Jan Montoya is a 79 y o  male patient who originally presented to the hospital on 6/25/2023 due to hypoglycemia episodes and syncope  He is a brittle diabetic and has known chronic orthostatic hypotension and adrenal insufficiency  He was seen in consultation by Endocrinology  His steroids were temporarily increased  Patient remained stable  Plan on discharge is to resume home dose steroids and decrease Lantus to 16 units at bedtime  Patient was seen in consultation by Cardiology for abnormal EKG  They felt that the patient did not have ACS and recommended continuing home medications  No other testing required  Patient will be discharged home in stable condition  He will have VNA in addition to his home health aid  Please see above list of diagnoses and related plan for additional information  Condition at Discharge: good    Discharge Day Visit / Exam:   Subjective:  Offers no complaints  Ambulating around room without difficulty  Denies dizziness, SOB, cough, nausea, vomiting, abdominal pain, urinary symptoms  Vitals: Blood Pressure: 130/62 (06/27/23 0822)  Pulse: 81 (06/27/23 0822)  Temperature: (!) 97 4 °F (36 3 °C) (06/27/23 0729)  Temp Source: Oral (06/26/23 0517)  Respirations: 16 (06/27/23 0729)  Height: 5' 4\" (162 6 cm) (06/26/23 0517)  Weight - Scale: 84 3 kg (185 lb 13 6 oz) (06/26/23 0517)  SpO2: 99 % (06/27/23 4570)  Exam:   Physical Exam  Vitals and nursing note reviewed  Constitutional:       General: He is not in acute distress  Appearance: He is well-developed  HENT:      Head: Normocephalic and atraumatic  Eyes:      Conjunctiva/sclera: Conjunctivae normal    Cardiovascular:      Rate and Rhythm: Normal rate and regular rhythm  Heart sounds: No murmur heard  Pulmonary:      Effort: Pulmonary effort is normal  No respiratory distress  Breath sounds: Normal breath sounds     Abdominal:      " Palpations: Abdomen is soft  Tenderness: There is no abdominal tenderness  Musculoskeletal:         General: No swelling  Cervical back: Neck supple  Skin:     General: Skin is warm and dry  Neurological:      Mental Status: He is alert  Psychiatric:         Mood and Affect: Mood normal  Affect is flat  Discussion with Family: Updated  (daughter) via phone  Discharge instructions/Information to patient and family:   See after visit summary for information provided to patient and family  Provisions for Follow-Up Care:  See after visit summary for information related to follow-up care and any pertinent home health orders  Disposition:   Home with VNA Services (Reminder: Complete face to face encounter)    Planned Readmission: none     Discharge Statement:  I spent 45 minutes discharging the patient  This time was spent on the day of discharge  I had direct contact with the patient on the day of discharge  Greater than 50% of the total time was spent examining patient, answering all patient questions, arranging and discussing plan of care with patient as well as directly providing post-discharge instructions  Additional time then spent on discharge activities  Discharge Medications:  See after visit summary for reconciled discharge medications provided to patient and/or family        **Please Note: This note may have been constructed using a voice recognition system**

## 2023-06-27 NOTE — PROGRESS NOTES
-- Patient: Estuardo Romeo  -- MRN: [id number]  -- Aidin Request ID: [id number]  -- Level of care reserved: [address] [place] [address]  -- Partner Reserved: [facility], [facility], [address] [place] [address] [phone]  -- Clinical needs requested:  -- Geography searched: [id number]  -- Start of Service:  -- Request sent: 10:56am EDT on 6/27/2023 by Ana Lou  -- Partner reserved: 11:45am EDT on 6/27/2023 by Gigi Honeycutt  -- Choice list shared: 11:45am EDT on 6/27/2023 by Gigi Honeycutt

## 2023-06-27 NOTE — CASE MANAGEMENT
Case Management Assessment & Discharge Planning Note    Patient name Isabella Khanna  [facility] MRN [id number]  : 1955 Date 2023       Current Admission Date: 2023  Current Admission Diagnosis:Type 1 diabetes mellitus with hypoglycemia and without coma [facility])   Patient Active Problem List    Diagnosis Date Noted   • Spells of decreased attentiveness 2023   • Cerebrovascular accident (CVA) ([facility] [address] ) 2023   • Iron deficiency anemia, unspecified 2023   • Syncope 2023   • Acute encephalopathy 2022   • Unspecified protein-calorie malnutrition ([facility] [address] ) 2022   • Unintentional weight loss 2022   • Type 2 MI (myocardial infarction) ([facility] ) 2022   • Orthostatic hypotension 2022   • Recurrent hypoglycemia 10/19/2022   • Type 1 diabetes mellitus with hypoglycemia and without coma ([place] [address] ) 10/19/2022   • Adrenal insufficiency  10/19/2022   • Paroxysmal atrial fibrillation  10/19/2022   • History of stroke 10/19/2022   • Anemia of chronic disease 10/19/2022   • Psychiatric disorder 10/19/2022      LOS (days): 2  Geometric Mean LOS (GMLOS) (days): 2 90  Days to GMLOS:1 3     OBJECTIVE:    Risk of Unplanned Readmission Score: 25 82         Current admission status: Inpatient       Preferred Pharmacy:   [place] [address]  Phone: [phone] Fax: [phone] [address] [place] [address]  Phone: [phone] Fax: [phone]    Primary Care Provider: Latha Leary MD    Primary Insurance: [address] [place] [address]  Secondary Insurance:     ASSESSMENT:  Keturah Mccullough Proxies    There are no active Health Care Proxies on file                   Readmission Root Cause  30 Day Readmission: No    Patient Information  Admitted from[de-identified] Home  Mental Status: Alert  During Assessment patient was accompanied by: Not accompanied during assessment  Assessment information provided by[de-identified] Patient  Primary Caregiver: Self  Support Systems: Daughter, Private Caregivers, Spouse/significant other  [place] of Residence: [address] [place] [address] do you live in?: [facility] entry access options  Select all that apply : Stairs  Number of steps to enter home : 5  Do the steps have railings?: No  Type of Current Residence: Other (Comment)  In the last 12 months, was there a time when you were not able to pay the mortgage or rent on time?: No  In the last 12 months, how many places have you lived?: 1  In the last 12 months, was there a time when you did not have a steady place to sleep or slept in a shelter (including now)?: No  Homeless/housing insecurity resource given?: N/A  Living Arrangements: Lives w/ Spouse/significant other (Pt stated he lives with his fiance)  Is patient a ?: No    Activities of Daily Living Prior to Admission  Functional Status: Assistance  Completes ADLs independently?: No  Level of ADL dependence: Assistance  Ambulates independently?: No  Level of ambulatory dependence: Assistance  Does patient use assisted devices?: Yes  Assisted Devices (DME) used:  Shower Chair, Walker  Does patient currently own DME?: Yes  What DME does the patient currently own?: Nazia Blanco  Does patient have a history of Outpatient Therapy (PT/OT)?: Yes  Does the patient have a history of Short-Term Rehab?: Yes  Does patient have a history of HHC?: Yes  Does patient currently have [facility]?: Yes    Current Home Health Care  Type of Current Home Care Services: Home health aide  Current Home Health Follow-Up Provider[de-identified] PCP    Patient Information Continued  Income Source: Unemployed  Does patient have prescription coverage?: Yes  Within the past 12 months, you worried that your food would run out before you got the money to buy more : Never true  Within the past 12 months, the food you bought just didn't last and you didn't have money to get more : Never true  Food insecurity resource given?: N/A  Does patient receive dialysis treatments?: No  Does patient have a history of substance abuse?: No  Does patient have a history of Mental Health Diagnosis?: No         Means of Transportation  Means of Transport to Appts[de-identified] Family transport  In the past 12 months, has lack of transportation kept you from medical appointments or from getting medications?: No  In the past 12 months, has lack of transportation kept you from meetings, work, or from getting things needed for daily living?: No  Was application for public transport provided?: N/A        DISCHARGE DETAILS:    Discharge planning discussed with[de-identified] pt at bedside  pt daughter via phone  Freedom of Choice: Yes  Comments - Freedom of Choice: Pt [facility]     contacted family/caregiver?: Yes  Were Treatment Team discharge recommendations reviewed with patient/caregiver?: Yes  Did patient/caregiver verbalize understanding of patient care needs?: Yes  Were patient/caregiver advised of the risks associated with not following Treatment Team discharge recommendations?: Yes    Contacts  Patient Contacts: Gwendolyn Mccabe  Relationship to Patient[de-identified] Family  Contact Method: Phone  Phone Number: [phone]  Reason/Outcome: Continuity of Care, Emergency Contact, Discharge [address]         Is the patient interested in [address] at discharge?: Yes  [address] requested[de-identified] [address] Name[de-identified] [address] [facility] External Referral Reason (only applicable if external HHA name selected): Patient has established relationship with provider  [address] Provider[de-identified] PCP  Home Health Services Needed[de-identified] Diabetes Management  Homebound Criteria Met[de-identified] Requires the Assistance of Another Person for Safe Ambulation or to Leave the Home, Uses an Assist Device (i e  cane, walker, etc)  Supporting Clincal Findings[de-identified] Limited Endurance    DME Referral Provided  Referral made for DME?: No    Other Referral/Resources/Interventions Provided:  Interventions: [facility]         Treatment Team Recommendation: [facility] [address]  Discharge Destination Plan[de-identified] Home with Gopal at Discharge : Family          CM reviewed d/c planning process including the following: identifying help at home, patient preference for d/c planning needs, Discharge Lounge, [facility] Meds to Bed program, availability of treatment team to discuss questions or concerns patient and/or family may have regarding understanding medications and recognizing signs and symptoms once discharged  CM also encouraged patient to follow up with all recommended appointments after discharge  Patient advised of importance for patient and family to participate in managing patient’s medical well being  Patient/caregiver received discharge checklist   Content reviewed  Patient/caregiver encouraged to participate in discharge plan of care prior to discharge home  Additional Comments: CM introduced herself and role to pt at bedside  Pt stated he lives in a house with his fiance in [place]  Pt stated he has a caregiver that comes in 5 days a week to assist him with his ADLS/IADLS  Pt stated once he is ready for discharge, his daughter will be able to provide transportation  CM spoke to pt daughter via phone and she stated pt has a private caregiver that comes in 20 hours a week  CM discussed discharge recommendation with daughter, pt daughter stated she would like a referral sent to [facility] CM submitted and reserved referral with [facility]  CM will continue to follow as needed

## 2023-06-28 ENCOUNTER — HOME CARE VISIT (OUTPATIENT)
Dept: HOME HEALTH SERVICES | Facility: HOME HEALTHCARE | Age: 68
End: 2023-06-28

## 2023-06-28 NOTE — CASE COMMUNICATION
Agreeable to [facility] services and no other SN agencies in home: yes    Communication to the physician regarding delay: na    Insurance, copays, HB status reviewed: yes asked that insurance card be left for SN to check    Verified address and phone number: yes  Phone number is nasir  Requested that patient secure pets: has small blind dog    Medication bottles and DC instructions in home: nasir states she threw out the AVS   Will leave bottl es out      Wound care/IV supplies in home: na    CG present to learn:     Other concerns patient/family would like to address at visit: Sherri Sanchez is [address] number to call for scheduling

## 2023-06-29 ENCOUNTER — HOME CARE VISIT (OUTPATIENT)
Dept: HOME HEALTH SERVICES | Facility: HOME HEALTHCARE | Age: 68
End: 2023-06-29
Payer: COMMERCIAL

## 2023-06-29 ENCOUNTER — TELEMEDICINE (OUTPATIENT)
Dept: ENDOCRINOLOGY | Facility: CLINIC | Age: 68
End: 2023-06-29
Payer: COMMERCIAL

## 2023-06-29 VITALS
SYSTOLIC BLOOD PRESSURE: 130 MMHG | DIASTOLIC BLOOD PRESSURE: 60 MMHG | HEART RATE: 77 BPM | TEMPERATURE: 98 F | OXYGEN SATURATION: 98 % | RESPIRATION RATE: 16 BRPM

## 2023-06-29 VITALS — HEART RATE: 61 BPM | OXYGEN SATURATION: 95 %

## 2023-06-29 DIAGNOSIS — E10.649 TYPE 1 DIABETES MELLITUS WITH HYPOGLYCEMIA AND WITHOUT COMA (HCC): Primary | ICD-10-CM

## 2023-06-29 DIAGNOSIS — E55.9 VITAMIN D DEFICIENCY: ICD-10-CM

## 2023-06-29 DIAGNOSIS — E27.40 ADRENAL INSUFFICIENCY (HCC): ICD-10-CM

## 2023-06-29 PROCEDURE — 95251 CONT GLUC MNTR ANALYSIS I&R: CPT | Performed by: INTERNAL MEDICINE

## 2023-06-29 PROCEDURE — 400013 VN SOC

## 2023-06-29 PROCEDURE — G0299 HHS/HOSPICE OF RN EA 15 MIN: HCPCS

## 2023-06-29 PROCEDURE — 99214 OFFICE O/P EST MOD 30 MIN: CPT | Performed by: INTERNAL MEDICINE

## 2023-06-29 PROCEDURE — G0152 HHCP-SERV OF OT,EA 15 MIN: HCPCS

## 2023-06-29 NOTE — PROGRESS NOTES
Follow-up Patient Progress Note      CC: type 1 diabetes     History of Present Illness:   74 yr male with Type 1 diabetes since age 45yr with brittle diabetes, hypertension, obesity, hyperlipidemia, atrial fibrillation, orthostasis, anxiety/depression, anemia, adrenal insufficiency and coronary artery disease   Last visit was 12/27/2022 in office  He was just admitted to the hospital 2 days ago for significant hypoglycemia  He was discharged with slightly increased dose of hydrocortisone and readjustment of basal bolus insulin therapy        CGM data review[de-identified]  Device: dexcom          Dates:  6/15/23 - 6/29/23     Usage: 86 %   Av glu: 262 mg/dL                   SD: 95 mg/dL        GMI: 9 6  %  TIR: 22 %        TAR: 17+59 %           TBR: 1+<1 %     Glycemic patters:   He has significant hyperglycemia both fasting and postprandial but more postprandial   He also has significant variability in fasting glucose with frequent normoglycemia/mild hypoglycemia  Hypoglycemia: No       Current meds:  Lantus 16 units q h s    Humalog KwikPen 7-7-7 units with each meal  Hydrocortisone 20 mg am and 15 mg pm dose     Opthamology: yes  Podiatry: no  vaccination: yes  Dental:  Pancreatitis: no     Ace/ARB: no  Statin:  Lipitor  Thyroid issues:  No    Patient Active Problem List   Diagnosis   • Recurrent hypoglycemia   • Type 1 diabetes mellitus with hypoglycemia and without coma (HCC)   • Adrenal insufficiency    • Paroxysmal atrial fibrillation    • History of stroke   • Anemia of chronic disease   • Psychiatric disorder   • Type 2 MI (myocardial infarction) (HCC)   • Orthostatic hypotension   • Unintentional weight loss   • Unspecified protein-calorie malnutrition (HCC)   • Acute encephalopathy   • Syncope   • Iron deficiency anemia, unspecified   • Cerebrovascular accident (CVA) ([facility] )   • Spells of decreased attentiveness     Past Medical History:   Diagnosis Date   • Diabetes mellitus ([facility] )    • Obesity, morbid ([facility] ) 11/14/2022      No past surgical history on file  Family History   Problem Relation Age of Onset   • Heart disease Mother    • Cancer Father    • Multiple sclerosis Sister      Social History     Tobacco Use   • Smoking status: Former     Packs/day: 0 25     Years: 30 00     Total pack years: 7 50     Types: Cigarettes   • Smokeless tobacco: Never   Substance Use Topics   • Alcohol use: Not Currently     Alcohol/week: 5 0 standard drinks of alcohol     Types: 5 Cans of beer per week     Comment: Quit in august     Allergies   Allergen Reactions   • Imipramine Other (See Comments)   • Lisinopril Other (See Comments)   • Paroxetine Other (See Comments)   • Penicillins Hives   • Rosiglitazone Other (See Comments)   • Troglitazone Other (See Comments)         Current Outpatient Medications:   •  aspirin 81 mg chewable tablet, Chew 1 tablet (81 mg total) daily, Disp: 30 tablet, Rfl: 0  •  atorvastatin (LIPITOR) 10 mg tablet, Take 1 tablet by mouth daily, Disp: , Rfl:   •  BD Pen Needle Blessing 2nd Gen 32G X 4 MM MISC, FOR USE WITH INSULIN PEN  PHARMACY MAY DISPENSE BRAND COVERED BY INSURANCE , Disp: 100 each, Rfl: 3  •  calcitriol (ROCALTROL) 0 25 mcg capsule, TAKE 1 CAPSULE BY MOUTH EVERY DAY, Disp: 90 capsule, Rfl: 1  •  Continuous Blood Gluc  (Dexcom G6 ) STEPHEN, Use 1 Units continuous, Disp: 1 each, Rfl: 0  •  Continuous Blood Gluc Sensor (Dexcom G6 Sensor) MISC, Use 1 each every 10 days, Disp: 3 each, Rfl: 2  •  Continuous Blood Gluc Transmit (Dexcom G6 Transmitter) MISC, 1 each every 3 months, Disp: 1 each, Rfl: 2  •  cyanocobalamin (VITAMIN B-12) 100 mcg tablet, Take by mouth daily, Disp: , Rfl:   •  Eliquis 5 MG, TAKE 1 TABLET BY MOUTH TWICE A DAY, Disp: 60 tablet, Rfl: 1  •  ferrous sulfate 324 (65 Fe) mg, TAKE 1 TABLET (324 MG TOTAL) BY MOUTH DAILY BEFORE BREAKFAST, Disp: 90 tablet, Rfl: 2  •  fludrocortisone (FLORINEF) 0 1 mg tablet, TAKE 2 TABLETS BY MOUTH DAILY  , Disp: 60 tablet, Rfl: 2  • hydrocortisone (CORTEF) 5 mg tablet, TAKE 20MG(4 TABS) ON WAKING UP AND 15MG(3 TABS) IN EVENING, Disp: 180 tablet, Rfl: 1  •  hydrocortisone (CORTEF) 5 mg tablet, Take 20mg(4 tabs) on waking up and  15mg(3 tabs) in evening, Disp: 180 tablet, Rfl: 1  •  insulin glargine (Toujeo SoloStar) 300 units/mL CONCENTRATED U-300 injection pen (1-unit dial), Inject 16 Units under the skin daily at bedtime, Disp: 15 mL, Rfl: 0  •  insulin lispro (HumaLOG) 100 units/mL injection pen, Use 6 units of breakfast, 7 units with lunch and 7 units with dinner +1 unit per 50 above 150 mg/dL; maximum daily dose 40 units, Disp: 15 mL, Rfl: 2  •  Insulin Pen Needle (BD Pen Needle Blessing 2nd Gen) 32G X 4 MM MISC, For use with insulin pen  Pharmacy may dispense brand covered by insurance , Disp: 100 each, Rfl: 0  •  midodrine (PROAMATINE) 10 MG tablet, TAKE 1 AND 1/2 TABLETS (15 MG TOTAL) BY MOUTH 3 (THREE) TIMES A DAY BEFORE MEALS, Disp: 405 tablet, Rfl: 4  •  potassium chloride (Klor-Con) 10 mEq tablet, Take 20 mEq by mouth 2 (two) times a day, Disp: , Rfl:   •  risperiDONE (RisperDAL) 1 mg tablet, Take 1 tablet by mouth 2 (two) times a day, Disp: , Rfl:   •  sertraline (ZOLOFT) 100 mg tablet, Take 100 mg by mouth daily, Disp: , Rfl:   •  sodium chloride 1 g tablet, Take 1 tablet (1 g total) by mouth 3 (three) times a day, Disp: 270 tablet, Rfl: 3  •  sodium chloride, concentrated, 2 5 MEQ/ML, Take 1 g by mouth Three times a day, Disp: , Rfl:     Review of Systems   Constitutional: Positive for activity change and appetite change  HENT: Negative  Eyes: Negative  Respiratory: Negative  Cardiovascular: Negative for chest pain  Gastrointestinal: Negative  Endocrine: Negative  Genitourinary: Negative  Musculoskeletal: Negative  Skin: Negative  Allergic/Immunologic: Negative  Neurological: Negative  Hematological: Negative  Psychiatric/Behavioral: Negative      All other systems reviewed and are "negative  Physical Exam:  There is no height or weight on file to calculate BMI  There were no vitals taken for this visit  There were no vitals filed for this visit  Physical Exam  Constitutional:       General: He is not in acute distress  Appearance: He is well-developed  He is not ill-appearing  HENT:      Head: Normocephalic and atraumatic  Nose: Nose normal       Mouth/Throat:      Pharynx: Oropharynx is clear  Eyes:      Extraocular Movements: Extraocular movements intact  Conjunctiva/sclera: Conjunctivae normal    Neck:      Thyroid: No thyromegaly  Cardiovascular:      Rate and Rhythm: Normal rate  Pulmonary:      Effort: Pulmonary effort is normal    Musculoskeletal:         General: No deformity  Cervical back: Normal range of motion  Skin:     Capillary Refill: Capillary refill takes less than 2 seconds  Coloration: Skin is not pale  Findings: No rash  Neurological:      Mental Status: He is alert and oriented to person, place, and time  Psychiatric:         Behavior: Behavior normal          Labs:   Lab Results   Component Value Date    HGBA1C 9 0 (H) 06/26/2023       Lab Results   Component Value Date    NSX1ZHHADDFD 0 779 06/25/2023       Lab Results   Component Value Date    CREATININE 0 96 06/27/2023    CREATININE 1 11 06/26/2023    CREATININE 1 24 06/25/2023    BUN 18 06/27/2023    K 4 0 06/27/2023     (H) 06/27/2023    CO2 26 06/27/2023     eGFR   Date Value Ref Range Status   06/27/2023 82 ml/min/1 73sq m Final       Lab Results   Component Value Date    ALT 19 06/25/2023    AST 15 06/25/2023    ALKPHOS 64 06/25/2023       No results found for: \"CHOLESTEROL\"  No results found for: \"HDL\"  No results found for: \"TRIG\"  No results found for: \"NONHDLC\"      Impression:  1  Type 1 diabetes mellitus with hypoglycemia and without coma ([facility] )    2  Adrenal insufficiency     3   Vitamin D deficiency         Plan:    Diagnoses and all orders for " this visit:    Type 1 diabetes mellitus with hypoglycemia and without coma ([facility] )  He is uncontrolled with both hypoglycemia and hyperglycemia given brittle diabetes  Review of AGP over the last 2 weeks shows significant variability overnight while fasting  GMI was 9 6% and average glucose was 262 mg/dL  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  Today we discussed options with patient and daughter and agreed to continue basal and mealtime insulin at current doses but increased correctional insulin only during daytime where the majority of his hyperglycemia seems to be present  Humalog Insulin    7u    7u    7u     Regular, Apidra, Humalog orNovolog Sliding Scale:   <80                      151-200 +1  +1 +1     201-250 +2 +2 +2 +   251-320 +3 +3 +3 +   321-400 +4 +4 +4 +   >400 +5 +5 +5 +         Lantus Insulin       16u       Follow-up in 6 weeks with CGM review  -     Hemoglobin A1C; Future  -     Albumin / creatinine urine ratio; Future    Adrenal insufficiency  He has primary adrenal insufficiency associated with orthostasis  He is currently on hydrocortisone 20 mg a m /15 mg p m  dose, fludrocortisone 0 2 mg daily and midodrine  Based on significant overnight hypoglycemic episodes, we agreed to continue him on hydrocortisone 35 mg daily dose for replacement therapy    Vitamin D deficiency  Advised vitamin D3 5000 IU daily OTC   -     Vitamin D 25 hydroxy; Future        I have spent 35 minutes with patient today in which greater than 50% of this time was spent in counseling/coordination of care  Discussed with the patient and all questioned fully answered  He will call me if any problems arise  Educated/ Counseled patient on diagnostic test results, prognosis, risk vs benefit of treatment options, importance of treatment compliance, healthy life and lifestyle choices        Phyllis Salazar

## 2023-06-29 NOTE — PATIENT INSTRUCTIONS
INSULIN DOSAGE INSTRUCTIONS    Name: Marlene Youssef                        : 1955  MRN #: [id number]    Your Current Insulin  and dose is: Before Breakfast Before Lunch Before Evening Meal Bedtime     Humalog Insulin    7u    7u    7u     Regular, Apidra, Humalog orNovolog Sliding Scale:   <80                      151-200 +1  +1 +1     201-250 +2 +2 +2 +   251-320 +3 +3 +3 +   321-400 +4 +4 +4 +   >400 +5 +5 +5 +         Lantus Insulin       16u     Additional Instructions:   Please test your blood sugar:  _4_ Times per day  X_ Before Breakfast                _ Alternate Testing  X_ Before Lunch                _ 2 Hours After  Meal  X_ Before Evening Meal               _ 3 a m   x_ Before Bedtime Snack     Target Blood sugar range _70_to _240__  Call if your Denisse Scott MD  blood sugar is less than _60_ or greater than _400__      Today's Date: 2023

## 2023-06-29 NOTE — CASE COMMUNICATION
[facility] has admitted your patient to [address] [place] [address] [facility] service with the following disciplines:      SN, PT and OT  This report is informational only, no responses is needed  Primary focus of home health care DM assess uncontrolled  Patient stated goals of care be able to remain at home  Anticipated visit pattern and next visit date 7/3/23  Mike Rocha 1w1 2w2 1w1  See medication list - meds in home differ from AVS no med issues patient has virtua l with endocrine today sn will assess outcome next visit to see if any changes made  Significant clinical findings patient with memory deficit thought it was 2013 and january  was able to remember self and place  daughter managing care and has friends who come stay and help patient when shes working  Potential barriers to goal achievement hx stroke limits patient  Other pertinent information lives in multilevel home, elderly dog in h ome    Thank you for allowing us to participate in the care of your patient        Dante Cardona RN [facility]

## 2023-06-30 ENCOUNTER — TELEPHONE (OUTPATIENT)
Dept: HEMATOLOGY ONCOLOGY | Facility: CLINIC | Age: 68
End: 2023-06-30

## 2023-06-30 ENCOUNTER — HOME CARE VISIT (OUTPATIENT)
Dept: HOME HEALTH SERVICES | Facility: HOME HEALTHCARE | Age: 68
End: 2023-06-30
Payer: COMMERCIAL

## 2023-06-30 VITALS — DIASTOLIC BLOOD PRESSURE: 58 MMHG | HEART RATE: 57 BPM | SYSTOLIC BLOOD PRESSURE: 115 MMHG | OXYGEN SATURATION: 100 %

## 2023-06-30 PROCEDURE — G0151 HHCP-SERV OF PT,EA 15 MIN: HCPCS

## 2023-07-03 ENCOUNTER — LAB REQUISITION (OUTPATIENT)
Dept: LAB | Facility: HOSPITAL | Age: 68
End: 2023-07-03
Payer: COMMERCIAL

## 2023-07-03 ENCOUNTER — TELEPHONE (OUTPATIENT)
Dept: HEMATOLOGY ONCOLOGY | Facility: CLINIC | Age: 68
End: 2023-07-03

## 2023-07-03 ENCOUNTER — HOME CARE VISIT (OUTPATIENT)
Dept: HOME HEALTH SERVICES | Facility: HOME HEALTHCARE | Age: 68
End: 2023-07-03
Payer: COMMERCIAL

## 2023-07-03 VITALS
RESPIRATION RATE: 16 BRPM | TEMPERATURE: 98 F | HEART RATE: 80 BPM | OXYGEN SATURATION: 98 % | DIASTOLIC BLOOD PRESSURE: 60 MMHG | SYSTOLIC BLOOD PRESSURE: 124 MMHG

## 2023-07-03 DIAGNOSIS — E10.649 TYPE 1 DIABETES MELLITUS WITH HYPOGLYCEMIA WITHOUT COMA (HCC): ICD-10-CM

## 2023-07-03 DIAGNOSIS — I95.1 ORTHOSTATIC HYPOTENSION: ICD-10-CM

## 2023-07-03 LAB
25(OH)D3 SERPL-MCNC: 7.9 NG/ML (ref 30–100)
ANION GAP SERPL CALCULATED.3IONS-SCNC: 2 MMOL/L
BASOPHILS # BLD AUTO: 0.01 THOUSANDS/ÂΜL (ref 0–0.1)
BASOPHILS NFR BLD AUTO: 0 % (ref 0–1)
BUN SERPL-MCNC: 14 MG/DL (ref 5–25)
CALCIUM SERPL-MCNC: 7.8 MG/DL (ref 8.3–10.1)
CHLORIDE SERPL-SCNC: 108 MMOL/L (ref 96–108)
CO2 SERPL-SCNC: 28 MMOL/L (ref 21–32)
CREAT SERPL-MCNC: 1.05 MG/DL (ref 0.6–1.3)
CREAT UR-MCNC: 33.6 MG/DL
EOSINOPHIL # BLD AUTO: 0.05 THOUSAND/ÂΜL (ref 0–0.61)
EOSINOPHIL NFR BLD AUTO: 1 % (ref 0–6)
ERYTHROCYTE [DISTWIDTH] IN BLOOD BY AUTOMATED COUNT: 16.8 % (ref 11.6–15.1)
EST. AVERAGE GLUCOSE BLD GHB EST-MCNC: 197 MG/DL
FERRITIN SERPL-MCNC: 17 NG/ML (ref 24–336)
GFR SERPL CREATININE-BSD FRML MDRD: 73 ML/MIN/1.73SQ M
GLUCOSE SERPL-MCNC: 231 MG/DL (ref 65–140)
HBA1C MFR BLD: 8.5 %
HCT VFR BLD AUTO: 31.1 % (ref 36.5–49.3)
HGB BLD-MCNC: 9.8 G/DL (ref 12–17)
IMM GRANULOCYTES # BLD AUTO: 0.06 THOUSAND/UL (ref 0–0.2)
IMM GRANULOCYTES NFR BLD AUTO: 1 % (ref 0–2)
IRON SATN MFR SERPL: 9 % (ref 20–50)
IRON SERPL-MCNC: 29 UG/DL (ref 65–175)
LYMPHOCYTES # BLD AUTO: 1.02 THOUSANDS/ÂΜL (ref 0.6–4.47)
LYMPHOCYTES NFR BLD AUTO: 12 % (ref 14–44)
MCH RBC QN AUTO: 28.8 PG (ref 26.8–34.3)
MCHC RBC AUTO-ENTMCNC: 31.5 G/DL (ref 31.4–37.4)
MCV RBC AUTO: 92 FL (ref 82–98)
MICROALBUMIN UR-MCNC: <5 MG/L (ref 0–20)
MICROALBUMIN/CREAT 24H UR: <15 MG/G CREATININE (ref 0–30)
MONOCYTES # BLD AUTO: 0.85 THOUSAND/ÂΜL (ref 0.17–1.22)
MONOCYTES NFR BLD AUTO: 10 % (ref 4–12)
NEUTROPHILS # BLD AUTO: 6.37 THOUSANDS/ÂΜL (ref 1.85–7.62)
NEUTS SEG NFR BLD AUTO: 76 % (ref 43–75)
NRBC BLD AUTO-RTO: 0 /100 WBCS
PLATELET # BLD AUTO: 169 THOUSANDS/UL (ref 149–390)
PMV BLD AUTO: 11.8 FL (ref 8.9–12.7)
POTASSIUM SERPL-SCNC: 3.4 MMOL/L (ref 3.5–5.3)
PTH-INTACT SERPL-MCNC: 76.9 PG/ML (ref 12–88)
RBC # BLD AUTO: 3.4 MILLION/UL (ref 3.88–5.62)
SODIUM SERPL-SCNC: 138 MMOL/L (ref 135–147)
TIBC SERPL-MCNC: 319 UG/DL (ref 250–450)
WBC # BLD AUTO: 8.36 THOUSAND/UL (ref 4.31–10.16)

## 2023-07-03 PROCEDURE — 82043 UR ALBUMIN QUANTITATIVE: CPT | Performed by: INTERNAL MEDICINE

## 2023-07-03 PROCEDURE — 83540 ASSAY OF IRON: CPT | Performed by: INTERNAL MEDICINE

## 2023-07-03 PROCEDURE — 83550 IRON BINDING TEST: CPT | Performed by: INTERNAL MEDICINE

## 2023-07-03 PROCEDURE — 80048 BASIC METABOLIC PNL TOTAL CA: CPT | Performed by: INTERNAL MEDICINE

## 2023-07-03 PROCEDURE — 85025 COMPLETE CBC W/AUTO DIFF WBC: CPT | Performed by: INTERNAL MEDICINE

## 2023-07-03 PROCEDURE — 83036 HEMOGLOBIN GLYCOSYLATED A1C: CPT | Performed by: INTERNAL MEDICINE

## 2023-07-03 PROCEDURE — 83970 ASSAY OF PARATHORMONE: CPT | Performed by: INTERNAL MEDICINE

## 2023-07-03 PROCEDURE — 82570 ASSAY OF URINE CREATININE: CPT | Performed by: INTERNAL MEDICINE

## 2023-07-03 PROCEDURE — G0299 HHS/HOSPICE OF RN EA 15 MIN: HCPCS

## 2023-07-03 PROCEDURE — 82728 ASSAY OF FERRITIN: CPT | Performed by: INTERNAL MEDICINE

## 2023-07-03 PROCEDURE — 82306 VITAMIN D 25 HYDROXY: CPT | Performed by: INTERNAL MEDICINE

## 2023-07-03 NOTE — TELEPHONE ENCOUNTER
Returned phone call to daughter, Rashad Fry. She reports not all labs were able to be drawn secondary to visiting nurse not being able to come out early enough for labs to be fasting. I let her know that fasting does not need to be done for any of the ordered labs; if she is able to get patient to the lab again before they close, great, but with tomorrow being the holiday, patient will end up not having all labs completed prior to appointment with [facility] on Wednesday morning. She has no other questions or concerns at this moment.

## 2023-07-03 NOTE — TELEPHONE ENCOUNTER
Patient Call    Who are you speaking with? child    If it is not the patient, are they listed on an active communication consent form? Yes   What is the reason for this call? Lab and visiting nurse couldn't get labs from patient   Does this require a call back? Yes   If a call back is required, please list best call back number [phone]   If a call back is required, advise that a message will be forwarded to their care team and someone will return their call as soon as possible. Did you relay this information to the patient?  Yes

## 2023-07-05 ENCOUNTER — TELEMEDICINE (OUTPATIENT)
Dept: HEMATOLOGY ONCOLOGY | Facility: CLINIC | Age: 68
End: 2023-07-05
Payer: COMMERCIAL

## 2023-07-05 DIAGNOSIS — D50.9 IRON DEFICIENCY ANEMIA, UNSPECIFIED IRON DEFICIENCY ANEMIA TYPE: ICD-10-CM

## 2023-07-05 DIAGNOSIS — D63.8 ANEMIA OF CHRONIC DISEASE: Primary | ICD-10-CM

## 2023-07-05 PROCEDURE — 99213 OFFICE O/P EST LOW 20 MIN: CPT | Performed by: NURSE PRACTITIONER

## 2023-07-05 NOTE — PROGRESS NOTES
Virtual Brief Visit    This Visit is being completed by telephone. The Patient is located at Home and in the following state in which I hold an active license [place]    The patient was identified by name and date of birth. Ford Grigsby was informed that this is a telemedicine visit and that the visit is being conducted through Telephone. My office door was closed. No one else was in the room. He acknowledged consent and understanding of privacy and security of the video platform. The patient has agreed to participate and understands they can discontinue the visit at any time. Patient is aware this is a billable service. Assessment/Plan:    Problem List Items Addressed This Visit        Other    Anemia of chronic disease - Primary    Iron deficiency anemia, unspecified    Patient is a 71-year-old male with a history of adrenal insufficiency, hypertension, type 2 diabetes, and iron deficiency anemia. He is currently on oral iron tolerating without difficulty. Iron saturation currently 9% with a ferritin level of 17. Patient's daughter states she is not able to take him for iron infusions. Given the ferritin is improving slowly we will continue oral iron. He follows with nephrology. Patient was recently hospitalized secondary to hypoglycemia as he is a brittle diabetic and hypotension. Hemoglobin has ranged between 9.7-10.6, normal white blood cell count and platelets. Creatinine ranges between 0.96-1.24 with an EGFR of 60s to 70s. We will continue to monitor iron saturations every 12 weeks and see him in 6 months. Patient and his daughter verbalized understanding and are in agreement with plan    . Recent Visits  Date Type Provider Dept   07/03/23 Telephone [facility]   Showing recent visits within past 7 days and meeting all other requirements  Today's Visits  Date Type Provider Dept   07/05/23 [facility]   Showing today's visits and meeting all other requirements  Future Appointments  No visits were found meeting these conditions.   Showing future appointments within next 150 days and meeting all other requirements     Visit Time  Total Visit Duration: 12 minutes

## 2023-07-06 ENCOUNTER — HOME CARE VISIT (OUTPATIENT)
Dept: HOME HEALTH SERVICES | Facility: HOME HEALTHCARE | Age: 68
End: 2023-07-06
Payer: COMMERCIAL

## 2023-07-06 ENCOUNTER — TELEPHONE (OUTPATIENT)
Dept: NEPHROLOGY | Facility: CLINIC | Age: 68
End: 2023-07-06

## 2023-07-06 DIAGNOSIS — E55.9 VITAMIN D DEFICIENCY: Primary | ICD-10-CM

## 2023-07-06 RX ORDER — ERGOCALCIFEROL 1.25 MG/1
50000 CAPSULE ORAL WEEKLY
Qty: 12 CAPSULE | Refills: 0 | Status: SHIPPED | OUTPATIENT
Start: 2023-07-06

## 2023-07-06 NOTE — TELEPHONE ENCOUNTER
Please let patient know that his vitamin D level remains lower, I have prescribed vitamin D 50,000 units weekly for next 3 months and then he should take OTC 2000's daily afterwards. Also iron level remains lower, please ensure that he is taking iron supplement 1 tablet daily. Hemoglobin seems to be stable. Potassium slightly lower 3.4. Believe he is taking potassium supplement 20 meq twice daily. Please have him increase potassium supplement to 20 meq 3 times daily for next 3 days and then continue twice daily dosing afterwards. He should have repeat potassium level in 1 week.

## 2023-07-07 ENCOUNTER — HOME CARE VISIT (OUTPATIENT)
Dept: HOME HEALTH SERVICES | Facility: HOME HEALTHCARE | Age: 68
End: 2023-07-07
Payer: COMMERCIAL

## 2023-07-07 VITALS
TEMPERATURE: 97.8 F | HEART RATE: 80 BPM | DIASTOLIC BLOOD PRESSURE: 72 MMHG | RESPIRATION RATE: 16 BRPM | SYSTOLIC BLOOD PRESSURE: 138 MMHG | OXYGEN SATURATION: 98 %

## 2023-07-07 VITALS — DIASTOLIC BLOOD PRESSURE: 82 MMHG | HEART RATE: 67 BPM | OXYGEN SATURATION: 98 % | SYSTOLIC BLOOD PRESSURE: 148 MMHG

## 2023-07-07 DIAGNOSIS — I95.1 ORTHOSTATIC HYPOTENSION: Primary | ICD-10-CM

## 2023-07-07 DIAGNOSIS — D63.8 ANEMIA OF CHRONIC DISEASE: ICD-10-CM

## 2023-07-07 DIAGNOSIS — D50.9 IRON DEFICIENCY ANEMIA, UNSPECIFIED IRON DEFICIENCY ANEMIA TYPE: ICD-10-CM

## 2023-07-07 PROCEDURE — G0299 HHS/HOSPICE OF RN EA 15 MIN: HCPCS

## 2023-07-07 PROCEDURE — G0151 HHCP-SERV OF PT,EA 15 MIN: HCPCS

## 2023-07-07 NOTE — TELEPHONE ENCOUNTER
Called patient and spoke with Bridget Garner went over Dr. Cohen Cassette message. Dr. Dow Better said patient is taking iron supplement. Sonia expressed understanding and thanked us for the call. No further questions at this time. Negative

## 2023-07-10 ENCOUNTER — HOME CARE VISIT (OUTPATIENT)
Dept: HOME HEALTH SERVICES | Facility: HOME HEALTHCARE | Age: 68
End: 2023-07-10
Payer: COMMERCIAL

## 2023-07-10 VITALS
DIASTOLIC BLOOD PRESSURE: 60 MMHG | HEART RATE: 74 BPM | OXYGEN SATURATION: 98 % | RESPIRATION RATE: 16 BRPM | SYSTOLIC BLOOD PRESSURE: 120 MMHG | TEMPERATURE: 97.8 F

## 2023-07-10 PROCEDURE — G0299 HHS/HOSPICE OF RN EA 15 MIN: HCPCS

## 2023-07-14 ENCOUNTER — HOME CARE VISIT (OUTPATIENT)
Dept: HOME HEALTH SERVICES | Facility: HOME HEALTHCARE | Age: 68
End: 2023-07-14
Payer: COMMERCIAL

## 2023-07-14 VITALS
DIASTOLIC BLOOD PRESSURE: 60 MMHG | TEMPERATURE: 97.9 F | SYSTOLIC BLOOD PRESSURE: 120 MMHG | HEART RATE: 80 BPM | OXYGEN SATURATION: 98 % | RESPIRATION RATE: 16 BRPM

## 2023-07-14 PROCEDURE — G0299 HHS/HOSPICE OF RN EA 15 MIN: HCPCS

## 2023-07-19 DIAGNOSIS — E27.40 ADRENAL INSUFFICIENCY (HCC): ICD-10-CM

## 2023-07-19 DIAGNOSIS — E10.9 INSULIN DEPENDENT TYPE 1 DIABETES MELLITUS (HCC): ICD-10-CM

## 2023-07-20 RX ORDER — INSULIN LISPRO 100 [IU]/ML
INJECTION, SOLUTION INTRAVENOUS; SUBCUTANEOUS
Qty: 15 ML | Refills: 0 | Status: SHIPPED | OUTPATIENT
Start: 2023-07-20

## 2023-07-20 RX ORDER — HYDROCORTISONE 5 MG/1
TABLET ORAL
Qty: 180 TABLET | Refills: 1 | Status: SHIPPED | OUTPATIENT
Start: 2023-07-20

## 2023-07-21 ENCOUNTER — HOME CARE VISIT (OUTPATIENT)
Dept: HOME HEALTH SERVICES | Facility: HOME HEALTHCARE | Age: 68
End: 2023-07-21
Payer: COMMERCIAL

## 2023-07-21 VITALS
OXYGEN SATURATION: 97 % | TEMPERATURE: 97.8 F | HEART RATE: 70 BPM | RESPIRATION RATE: 16 BRPM | SYSTOLIC BLOOD PRESSURE: 126 MMHG | DIASTOLIC BLOOD PRESSURE: 64 MMHG

## 2023-07-21 PROCEDURE — G0299 HHS/HOSPICE OF RN EA 15 MIN: HCPCS

## 2023-07-21 NOTE — CASE COMMUNICATION
SN rich from [facility] services no other skilled nursing needs.  patient at baseline daughter managing meds

## 2023-08-15 ENCOUNTER — TELEPHONE (OUTPATIENT)
Age: 68
End: 2023-08-15

## 2023-08-15 NOTE — TELEPHONE ENCOUNTER
Scheduled date of EGD/colonoscopy (as of today):09/21/2023  Physician performing EGD/colonoscopy:   Location of EGD/colonoscopy: [facility]  Clearances: N/A    Patient had prep instructions already , [facility] instructions sent via [facility] to confirm they have it.

## 2023-08-16 ENCOUNTER — TELEPHONE (OUTPATIENT)
Dept: CARDIOLOGY CLINIC | Facility: CLINIC | Age: 68
End: 2023-08-16

## 2023-08-16 DIAGNOSIS — I48.0 PAROXYSMAL ATRIAL FIBRILLATION (HCC): Primary | ICD-10-CM

## 2023-08-16 NOTE — TELEPHONE ENCOUNTER
What dose of salt tablets is he taking? He can increase this to 2g three times a day. If pale, check CBC to make sure not more anemic.

## 2023-08-18 ENCOUNTER — TREATMENT (OUTPATIENT)
Dept: OTHER | Facility: HOSPITAL | Age: 68
End: 2023-08-18
Payer: COMMERCIAL

## 2023-08-18 DIAGNOSIS — E10.649 TYPE 1 DIABETES MELLITUS WITH HYPOGLYCEMIA AND WITHOUT COMA (HCC): ICD-10-CM

## 2023-08-18 PROCEDURE — 95251 CONT GLUC MNTR ANALYSIS I&R: CPT | Performed by: INTERNAL MEDICINE

## 2023-08-18 NOTE — TELEPHONE ENCOUNTER
@Cardio-   Called and spoke to patient daughter advised patient to increase salt tablets and to complete labs. Per daughter patient is pale and clammy. I gave her the information for [facility]. @Endo-  Patient daughter also stated that patients sugars have been out of wack and in the 400's again. I asked Tejal to pull the data, Dr. Helen Brooks can you please review and advise.

## 2023-08-18 NOTE — PROGRESS NOTES
CGM data review[de-identified]  Device: dexcom Dates: 8/5/2023-8/18/2023 usage: 100% Av glu: 271 mg/dL  SD: 71 mg/dL CV:   % GMI:  %  TIR: 11%  TAR: 27+63%  TBR: 0  %    Glycemic patters: Severe fasting and postprandial hyperglycemia  Hypoglycemia: No    Recommendation:  Continue current regimen of insulin  Continue use of steroids. Please schedule him for sometime next week - Okay to add on.

## 2023-08-20 ENCOUNTER — APPOINTMENT (EMERGENCY)
Dept: RADIOLOGY | Facility: HOSPITAL | Age: 68
End: 2023-08-20
Payer: COMMERCIAL

## 2023-08-20 ENCOUNTER — HOSPITAL ENCOUNTER (OUTPATIENT)
Facility: HOSPITAL | Age: 68
Setting detail: OBSERVATION
Discharge: HOME | End: 2023-08-21
Attending: EMERGENCY MEDICINE | Admitting: INTERNAL MEDICINE
Payer: COMMERCIAL

## 2023-08-20 DIAGNOSIS — I21.A1 TYPE 2 MI (MYOCARDIAL INFARCTION) (HCC): ICD-10-CM

## 2023-08-20 DIAGNOSIS — E10.9 INSULIN DEPENDENT TYPE 1 DIABETES MELLITUS (HCC): ICD-10-CM

## 2023-08-20 DIAGNOSIS — N17.9 AKI (ACUTE KIDNEY INJURY) (HCC): ICD-10-CM
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
## 2023-08-20 PROBLEM — E10.65 TYPE 1 DIABETES MELLITUS WITH HYPERGLYCEMIA (HCC): Status: ACTIVE | Noted: 2022-10-19

## 2023-08-20 LAB
2HR DELTA HS TROPONIN: -6 NG/L
4HR DELTA HS TROPONIN: -2 NG/L
ALBUMIN SERPL BCP-MCNC: 3.5 G/DL (ref 3.5–5)
ALP SERPL-CCNC: 86 U/L (ref 46–116)
ALT SERPL W P-5'-P-CCNC: 31 U/L (ref 12–78)
ANION GAP SERPL CALCULATED.3IONS-SCNC: 5 MMOL/L
AST SERPL W P-5'-P-CCNC: 22 U/L (ref 5–45)
ATRIAL RATE: 74 BPM
BASOPHILS # BLD AUTO: 0.02 THOUSANDS/ÂΜL (ref 0–0.1)
BASOPHILS NFR BLD AUTO: 0 % (ref 0–1)
BILIRUB SERPL-MCNC: 0.39 MG/DL (ref 0.2–1)
BUN SERPL-MCNC: 26 MG/DL (ref 5–25)
CALCIUM SERPL-MCNC: 8.6 MG/DL (ref 8.3–10.1)
CARDIAC TROPONIN I PNL SERPL HS: 16 NG/L
CARDIAC TROPONIN I PNL SERPL HS: 20 NG/L
CARDIAC TROPONIN I PNL SERPL HS: 22 NG/L
CHLORIDE SERPL-SCNC: 110 MMOL/L (ref 96–108)
CO2 SERPL-SCNC: 23 MMOL/L (ref 21–32)
CORTIS SERPL-MCNC: 19.3 UG/DL
CREAT SERPL-MCNC: 1.63 MG/DL (ref 0.6–1.3)
EOSINOPHIL # BLD AUTO: 0.04 THOUSAND/ÂΜL (ref 0–0.61)
EOSINOPHIL NFR BLD AUTO: 0 % (ref 0–6)
ERYTHROCYTE [DISTWIDTH] IN BLOOD BY AUTOMATED COUNT: 16.7 % (ref 11.6–15.1)
GFR SERPL CREATININE-BSD FRML MDRD: 43 ML/MIN/1.73SQ M
GLUCOSE SERPL-MCNC: 210 MG/DL (ref 65–140)
GLUCOSE SERPL-MCNC: 276 MG/DL (ref 65–140)
GLUCOSE SERPL-MCNC: 278 MG/DL (ref 65–140)
GLUCOSE SERPL-MCNC: 325 MG/DL (ref 65–140)
HCT VFR BLD AUTO: 35.8 % (ref 36.5–49.3)
HGB BLD-MCNC: 11.5 G/DL (ref 12–17)
IMM GRANULOCYTES # BLD AUTO: 0.07 THOUSAND/UL (ref 0–0.2)
IMM GRANULOCYTES NFR BLD AUTO: 1 % (ref 0–2)
LYMPHOCYTES # BLD AUTO: 1.01 THOUSANDS/ÂΜL (ref 0.6–4.47)
LYMPHOCYTES NFR BLD AUTO: 11 % (ref 14–44)
MCH RBC QN AUTO: 29.3 PG (ref 26.8–34.3)
MCHC RBC AUTO-ENTMCNC: 32.1 G/DL (ref 31.4–37.4)
MCV RBC AUTO: 91 FL (ref 82–98)
MONOCYTES # BLD AUTO: 0.92 THOUSAND/ÂΜL (ref 0.17–1.22)
MONOCYTES NFR BLD AUTO: 10 % (ref 4–12)
NEUTROPHILS # BLD AUTO: 7.12 THOUSANDS/ÂΜL (ref 1.85–7.62)
NEUTS SEG NFR BLD AUTO: 78 % (ref 43–75)
NRBC BLD AUTO-RTO: 0 /100 WBCS
P AXIS: 36 DEGREES
PLATELET # BLD AUTO: 159 THOUSANDS/UL (ref 149–390)
PMV BLD AUTO: 11.3 FL (ref 8.9–12.7)
POTASSIUM SERPL-SCNC: 4.4 MMOL/L (ref 3.5–5.3)
PR INTERVAL: 150 MS
PROT SERPL-MCNC: 6.7 G/DL (ref 6.4–8.4)
QRS AXIS: 32 DEGREES
QRSD INTERVAL: 72 MS
QT INTERVAL: 406 MS
QTC INTERVAL: 450 MS
RBC # BLD AUTO: 3.93 MILLION/UL (ref 3.88–5.62)
SODIUM SERPL-SCNC: 138 MMOL/L (ref 135–147)
T WAVE AXIS: 34 DEGREES
VENTRICULAR RATE: 74 BPM
WBC # BLD AUTO: 9.18 THOUSAND/UL (ref 4.31–10.16)

## 2023-08-20 PROCEDURE — 99285 EMERGENCY DEPT VISIT HI MDM: CPT

## 2023-08-20 PROCEDURE — 82948 REAGENT STRIP/BLOOD GLUCOSE: CPT

## 2023-08-20 PROCEDURE — 71045 X-RAY EXAM CHEST 1 VIEW: CPT

## 2023-08-20 PROCEDURE — 84484 ASSAY OF TROPONIN QUANT: CPT

## 2023-08-20 PROCEDURE — 96360 HYDRATION IV INFUSION INIT: CPT

## 2023-08-20 PROCEDURE — 70450 CT HEAD/BRAIN W/O DYE: CPT

## 2023-08-20 PROCEDURE — 80053 COMPREHEN METABOLIC PANEL: CPT

## 2023-08-20 PROCEDURE — 74177 CT ABD & PELVIS W/CONTRAST: CPT

## 2023-08-20 PROCEDURE — 93010 ELECTROCARDIOGRAM REPORT: CPT | Performed by: INTERNAL MEDICINE

## 2023-08-20 PROCEDURE — 82533 TOTAL CORTISOL: CPT

## 2023-08-20 PROCEDURE — 99223 1ST HOSP IP/OBS HIGH 75: CPT | Performed by: INTERNAL MEDICINE

## 2023-08-20 PROCEDURE — 99285 EMERGENCY DEPT VISIT HI MDM: CPT | Performed by: EMERGENCY MEDICINE

## 2023-08-20 PROCEDURE — 93005 ELECTROCARDIOGRAM TRACING: CPT

## 2023-08-20 PROCEDURE — 36415 COLL VENOUS BLD VENIPUNCTURE: CPT

## 2023-08-20 PROCEDURE — 85025 COMPLETE CBC W/AUTO DIFF WBC: CPT

## 2023-08-20 PROCEDURE — G1004 CDSM NDSC: HCPCS

## 2023-08-20 RX ORDER — APIXABAN 5 MG/1
TABLET, FILM COATED ORAL
Qty: 60 TABLET | Refills: 1 | Status: SHIPPED | OUTPATIENT
Start: 2023-08-20

## 2023-08-20 RX ORDER — MIDODRINE HYDROCHLORIDE 5 MG/1
15 TABLET ORAL
Status: DISCONTINUED | OUTPATIENT
Start: 2023-08-21 | End: 2023-08-21 | Stop reason: HOSPADM

## 2023-08-20 RX ORDER — SERTRALINE HYDROCHLORIDE 100 MG/1
100 TABLET, FILM COATED ORAL DAILY
Status: DISCONTINUED | OUTPATIENT
Start: 2023-08-21 | End: 2023-08-21 | Stop reason: HOSPADM

## 2023-08-20 RX ORDER — SODIUM CHLORIDE 1 G/1
1 TABLET ORAL 3 TIMES DAILY
Status: DISCONTINUED | OUTPATIENT
Start: 2023-08-20 | End: 2023-08-21 | Stop reason: HOSPADM

## 2023-08-20 RX ORDER — ASPIRIN 81 MG/1
81 TABLET, CHEWABLE ORAL DAILY
Status: DISCONTINUED | OUTPATIENT
Start: 2023-08-21 | End: 2023-08-21 | Stop reason: HOSPADM

## 2023-08-20 RX ORDER — SODIUM CHLORIDE, SODIUM GLUCONATE, SODIUM ACETATE, POTASSIUM CHLORIDE, MAGNESIUM CHLORIDE, SODIUM PHOSPHATE, DIBASIC, AND POTASSIUM PHOSPHATE .53; .5; .37; .037; .03; .012; .00082 G/100ML; G/100ML; G/100ML; G/100ML; G/100ML; G/100ML; G/100ML
100 INJECTION, SOLUTION INTRAVENOUS CONTINUOUS
Status: DISCONTINUED | OUTPATIENT
Start: 2023-08-20 | End: 2023-08-21 | Stop reason: HOSPADM

## 2023-08-20 RX ORDER — ACETAMINOPHEN 325 MG/1
650 TABLET ORAL EVERY 6 HOURS PRN
Status: DISCONTINUED | OUTPATIENT
Start: 2023-08-20 | End: 2023-08-21 | Stop reason: HOSPADM

## 2023-08-20 RX ORDER — INSULIN LISPRO 100 [IU]/ML
1-6 INJECTION, SOLUTION INTRAVENOUS; SUBCUTANEOUS
Status: DISCONTINUED | OUTPATIENT
Start: 2023-08-20 | End: 2023-08-21 | Stop reason: HOSPADM

## 2023-08-20 RX ORDER — ONDANSETRON 2 MG/ML
4 INJECTION INTRAMUSCULAR; INTRAVENOUS EVERY 6 HOURS PRN
Status: DISCONTINUED | OUTPATIENT
Start: 2023-08-20 | End: 2023-08-21 | Stop reason: HOSPADM

## 2023-08-20 RX ORDER — ACETAMINOPHEN 325 MG/1
975 TABLET ORAL ONCE
Status: COMPLETED | OUTPATIENT
Start: 2023-08-20 | End: 2023-08-20

## 2023-08-20 RX ORDER — DOCUSATE SODIUM 100 MG/1
100 CAPSULE, LIQUID FILLED ORAL 2 TIMES DAILY
Status: DISCONTINUED | OUTPATIENT
Start: 2023-08-20 | End: 2023-08-21 | Stop reason: HOSPADM

## 2023-08-20 RX ORDER — ATORVASTATIN CALCIUM 10 MG/1
10 TABLET, FILM COATED ORAL DAILY
Status: DISCONTINUED | OUTPATIENT
Start: 2023-08-21 | End: 2023-08-21 | Stop reason: HOSPADM

## 2023-08-20 RX ORDER — INSULIN LISPRO 100 [IU]/ML
1-6 INJECTION, SOLUTION INTRAVENOUS; SUBCUTANEOUS
Status: DISCONTINUED | OUTPATIENT
Start: 2023-08-21 | End: 2023-08-21 | Stop reason: HOSPADM

## 2023-08-20 RX ORDER — HYDROCORTISONE 20 MG/1
20 TABLET ORAL DAILY
Status: DISCONTINUED | OUTPATIENT
Start: 2023-08-21 | End: 2023-08-21 | Stop reason: HOSPADM

## 2023-08-20 RX ORDER — CALCIUM CARBONATE 500 MG/1
1000 TABLET, CHEWABLE ORAL DAILY PRN
Status: DISCONTINUED | OUTPATIENT
Start: 2023-08-20 | End: 2023-08-21 | Stop reason: HOSPADM

## 2023-08-20 RX ORDER — INSULIN GLARGINE 100 [IU]/ML
20 INJECTION, SOLUTION SUBCUTANEOUS
Status: DISCONTINUED | OUTPATIENT
Start: 2023-08-20 | End: 2023-08-21 | Stop reason: HOSPADM

## 2023-08-20 RX ORDER — RISPERIDONE 1 MG/1
1 TABLET ORAL 2 TIMES DAILY
Status: DISCONTINUED | OUTPATIENT
Start: 2023-08-20 | End: 2023-08-21 | Stop reason: HOSPADM

## 2023-08-20 RX ORDER — SENNOSIDES 8.6 MG
2 TABLET ORAL DAILY
Status: DISCONTINUED | OUTPATIENT
Start: 2023-08-21 | End: 2023-08-21 | Stop reason: HOSPADM

## 2023-08-20 RX ORDER — FERROUS SULFATE 325(65) MG
325 TABLET ORAL
Status: DISCONTINUED | OUTPATIENT
Start: 2023-08-21 | End: 2023-08-21 | Stop reason: HOSPADM

## 2023-08-20 RX ORDER — FLUDROCORTISONE ACETATE 0.1 MG/1
0.2 TABLET ORAL DAILY
Status: DISCONTINUED | OUTPATIENT
Start: 2023-08-21 | End: 2023-08-21 | Stop reason: HOSPADM

## 2023-08-20 RX ORDER — INSULIN LISPRO 100 [IU]/ML
6 INJECTION, SOLUTION INTRAVENOUS; SUBCUTANEOUS
Status: DISCONTINUED | OUTPATIENT
Start: 2023-08-21 | End: 2023-08-21 | Stop reason: HOSPADM

## 2023-08-20 RX ORDER — POTASSIUM CHLORIDE 750 MG/1
20 TABLET, EXTENDED RELEASE ORAL 2 TIMES DAILY
Status: DISCONTINUED | OUTPATIENT
Start: 2023-08-21 | End: 2023-08-21 | Stop reason: HOSPADM

## 2023-08-20 RX ORDER — CALCITRIOL 0.25 UG/1
0.25 CAPSULE, LIQUID FILLED ORAL DAILY
Status: DISCONTINUED | OUTPATIENT
Start: 2023-08-21 | End: 2023-08-21 | Stop reason: HOSPADM

## 2023-08-20 RX ADMIN — SODIUM CHLORIDE 1000 ML: 0.9 INJECTION, SOLUTION INTRAVENOUS at 15:47

## 2023-08-20 RX ADMIN — ACETAMINOPHEN 975 MG: 325 TABLET, FILM COATED ORAL at 15:51

## 2023-08-20 RX ADMIN — APIXABAN 5 MG: 5 TABLET, FILM COATED ORAL at 23:09

## 2023-08-20 RX ADMIN — SODIUM CHLORIDE, SODIUM GLUCONATE, SODIUM ACETATE, POTASSIUM CHLORIDE, MAGNESIUM CHLORIDE, SODIUM PHOSPHATE, DIBASIC, AND POTASSIUM PHOSPHATE 100 ML/HR: .53; .5; .37; .037; .03; .012; .00082 INJECTION, SOLUTION INTRAVENOUS at 22:58

## 2023-08-20 RX ADMIN — DOCUSATE SODIUM 100 MG: 100 CAPSULE, LIQUID FILLED ORAL at 23:09

## 2023-08-20 RX ADMIN — INSULIN GLARGINE 20 UNITS: 100 INJECTION, SOLUTION SUBCUTANEOUS at 23:10

## 2023-08-20 RX ADMIN — IOHEXOL 100 ML: 350 INJECTION, SOLUTION INTRAVENOUS at 17:33

## 2023-08-20 RX ADMIN — INSULIN LISPRO 5 UNITS: 100 INJECTION, SOLUTION INTRAVENOUS; SUBCUTANEOUS at 23:08

## 2023-08-20 NOTE — ED PROVIDER NOTES
History  Chief Complaint   Patient presents with   • Multiple Falls     Dizzy and fell X2 today, witnessed with no HS. C/o HA that started today. Hx stroke. Low BP 31O systolic at home     Patient is a 65-year-old male with a significant past medical history of diabetes, CVA anticoagulated on Eliquis, orthostatic hypotension, adrenal insufficiency maintained on Florinef and Cortef, presenting for evaluation of multiple falls. Patient presents with daughter who provides the history. As per daughter, the patient has had at least 2 separate witnessed falls, both of which because he was lightheaded after standing and fell backwards landing on his buttocks. As per daughter he has not struck his head at all. He has not lost consciousness. She has been noting that over the last several days he has had some hypotension with at least 1 blood pressure measured in the 72V systolic. She does state this is very similar to previous episodes of similar, which it appears he has been admitted for secondary to orthostatic hypotension and adrenal insufficiency. Today, the patient was complaining of a headache to the daughter. She is also concerned because she noticed a scar on his mid back that is secondary to a fall this past week. He otherwise has not been endorsing any complaints. Specifically, he denies chest pain, difficulty breathing, abdominal pain, vomiting, dysuria, fevers. He has been taking all medications as prescribed without any recent changes. Prior to Admission Medications   Prescriptions Last Dose Informant Patient Reported? Taking? BD Pen Needle Blessing 2nd Gen 32G X 4 MM MISC   No No   Sig: FOR USE WITH INSULIN PEN. PHARMACY MAY DISPENSE BRAND COVERED BY INSURANCE.    Continuous Blood Gluc  (Dexcom G6 ) STEPHEN   No No   Sig: Use 1 Units continuous   Continuous Blood Gluc Sensor (Dexcom G6 Sensor) MISC   No No   Sig: Use 1 each every 10 days   Continuous Blood Gluc Transmit (Dexcom G6 Transmitter) MISC   No No   Si each every 3 months   Eliquis 5 MG   No No   Sig: TAKE 1 TABLET BY MOUTH TWICE A DAY   Insulin Pen Needle (BD Pen Needle Blessing 2nd Gen) 32G X 4 MM MISC  Child No No   Sig: For use with insulin pen. Pharmacy may dispense brand covered by insurance. aspirin 81 mg chewable tablet   No No   Sig: Chew 1 tablet (81 mg total) daily   atorvastatin (LIPITOR) 10 mg tablet  Child Yes No   Sig: Take 1 tablet by mouth daily   calcitriol (ROCALTROL) 0.25 mcg capsule   No No   Sig: TAKE 1 CAPSULE BY MOUTH EVERY DAY   cyanocobalamin (VITAMIN B-12) 100 mcg tablet  Child Yes No   Sig: Take by mouth daily   ergocalciferol (VITAMIN D2) 50,000 units   No No   Sig: Take 1 capsule (50,000 Units total) by mouth once a week   ferrous sulfate 324 (65 Fe) mg   No No   Sig: TAKE 1 TABLET (324 MG TOTAL) BY MOUTH DAILY BEFORE BREAKFAST   fludrocortisone (FLORINEF) 0.1 mg tablet   No No   Sig: TAKE 2 TABLETS BY MOUTH DAILY.    hydrocortisone (CORTEF) 5 mg tablet   No No   Sig: Take 20mg(4 tabs) on waking up and  15mg(3 tabs) in evening   hydrocortisone (CORTEF) 5 mg tablet   No No   Sig: Take 4 tabs in AM and 3 tabs in PM   insulin glargine (Toujeo SoloStar) 300 units/mL CONCENTRATED U-300 injection pen (1-unit dial)   No No   Sig: Inject 16 Units under the skin daily at bedtime   insulin lispro (HumaLOG) 100 units/mL injection pen   No No   Sig: Use 6 units of breakfast, 7 units with lunch and 7 units with dinner +1 unit per 50 above 150 mg/dL; maximum daily dose 40 units   midodrine (PROAMATINE) 10 MG tablet   No No   Sig: TAKE 1 AND 1/2 TABLETS (15 MG TOTAL) BY MOUTH 3 (THREE) TIMES A DAY BEFORE MEALS   potassium chloride (Klor-Con) 10 mEq tablet  Child Yes No   Sig: Take 20 mEq by mouth 2 (two) times a day   risperiDONE (RisperDAL) 1 mg tablet  Child Yes No   Sig: Take 1 tablet by mouth 2 (two) times a day   sertraline (ZOLOFT) 100 mg tablet  Child Yes No   Sig: Take 100 mg by mouth daily   sodium chloride 1 g tablet  Child No No   Sig: Take 1 tablet (1 g total) by mouth 3 (three) times a day   sodium chloride, concentrated, 2.5 MEQ/ML   Yes No   Sig: Take 1 g by mouth Three times a day      Facility-Administered Medications: None       Past Medical History:   Diagnosis Date   • Diabetes mellitus ([address])    • Obesity, morbid ([address]) 11/14/2022   • Stroke [facility])        History reviewed. No pertinent surgical history. Family History   Problem Relation Age of Onset   • Heart disease Mother    • Cancer Father    • Multiple sclerosis Sister      I have reviewed and agree with the history as documented. E-Cigarette/Vaping   • E-Cigarette Use Never User      E-Cigarette/Vaping Substances     Social History     Tobacco Use   • Smoking status: Former     Packs/day: 0.25     Years: 30.00     Total pack years: 7.50     Types: Cigarettes   • Smokeless tobacco: Never   Vaping Use   • Vaping Use: Never used   Substance Use Topics   • Alcohol use: Not Currently     Alcohol/week: 5.0 standard drinks of alcohol     Types: 5 Cans of beer per week     Comment: Quit in august   • Drug use: Never        Review of Systems   Constitutional: Negative for fever. Respiratory: Negative for shortness of breath. Cardiovascular: Negative for chest pain. Gastrointestinal: Negative for abdominal pain and vomiting. Musculoskeletal: Negative for back pain and neck pain. Skin: Positive for wound. Neurological: Positive for weakness (Generalized), light-headedness and headaches. All other systems reviewed and are negative.       Physical Exam  ED Triage Vitals   Temperature Pulse Respirations Blood Pressure SpO2   08/20/23 1522 08/20/23 1522 08/20/23 1522 08/20/23 1522 08/20/23 1522   98 °F (36.7 °C) 73 18 96/61 96 %      Temp Source Heart Rate Source Patient Position - Orthostatic VS BP Location FiO2 (%)   08/20/23 1522 08/20/23 1522 08/21/23 0720 08/21/23 0720 --   Oral Monitor Lying - Orthostatic VS Left arm       Pain Score 08/20/23 1522       8             Orthostatic Vital Signs  Vitals:    08/20/23 2130 08/21/23 0720 08/21/23 0724 08/21/23 0727   BP: (!) 105/47 (!) 186/97 145/71 122/94   Pulse: 71 69 70 (!) 48   Patient Position - Orthostatic VS:  Lying - Orthostatic VS Sitting - Orthostatic VS Standing - Orthostatic VS       Physical Exam  Vitals and nursing note reviewed. Constitutional:       General: He is not in acute distress. Appearance: Normal appearance. He is not ill-appearing or toxic-appearing. HENT:      Head: Normocephalic and atraumatic. Right Ear: External ear normal.      Left Ear: External ear normal.      Nose: Nose normal.      Mouth/Throat:      Mouth: Mucous membranes are dry. Eyes:      General: No visual field deficit or scleral icterus. Right eye: No discharge. Left eye: No discharge. Extraocular Movements: Extraocular movements intact. Conjunctiva/sclera: Conjunctivae normal.      Pupils: Pupils are equal, round, and reactive to light. Cardiovascular:      Rate and Rhythm: Normal rate and regular rhythm. Pulses: Normal pulses. Heart sounds: Normal heart sounds. No murmur heard. No friction rub. No gallop. Pulmonary:      Effort: Pulmonary effort is normal. No respiratory distress. Breath sounds: Normal breath sounds. No wheezing, rhonchi or rales. Abdominal:      General: Abdomen is flat. There is no distension. Palpations: Abdomen is soft. There is no mass. Tenderness: There is no abdominal tenderness. Genitourinary:     Comments: Deferred  Musculoskeletal:      Right lower leg: No edema. Left lower leg: No edema. Comments: No midline vertebral tenderness, step-offs, or deformities. Skin:     General: Skin is warm and dry. Comments: There is a well-healing, 4 cm linear scar over the left lower thoracic to upper lumbar paravertebral musculature. Minimal tenderness to palpation of this area.    Neurological: General: No focal deficit present. Mental Status: He is alert and oriented to person, place, and time. GCS: GCS eye subscore is 4. GCS verbal subscore is 5. GCS motor subscore is 6. Cranial Nerves: No cranial nerve deficit, dysarthria or facial asymmetry. Sensory: Sensation is intact. No sensory deficit. Motor: Motor function is intact. No pronator drift. Coordination: Coordination is intact.  Finger-Nose-Finger Test normal.   Psychiatric:         Mood and Affect: Mood normal.         ED Medications  Medications   multi-electrolyte (PLASMALYTE-A/ISOLYTE-S PH 7.4) IV solution (100 mL/hr Intravenous New Bag 8/20/23 2258)   aspirin chewable tablet 81 mg (81 mg Oral Given 8/21/23 0821)   atorvastatin (LIPITOR) tablet 10 mg (10 mg Oral Given 8/21/23 0821)   calcitriol (ROCALTROL) capsule 0.25 mcg (0.25 mcg Oral Given 8/21/23 0820)   cyanocobalamin (VITAMIN B-12) tablet 100 mcg (100 mcg Oral Given 8/21/23 0821)   apixaban (ELIQUIS) tablet 5 mg (5 mg Oral Given 8/21/23 0821)   ferrous sulfate tablet 325 mg (325 mg Oral Given 8/21/23 0819)   fludrocortisone (FLORINEF) tablet 0.2 mg (0.2 mg Oral Given 8/21/23 0823)   hydrocortisone (CORTEF) tablet 20 mg (20 mg Oral Given 8/21/23 0819)   hydrocortisone (CORTEF) tablet 15 mg (15 mg Oral Given 8/21/23 0150)   insulin glargine (LANTUS) subcutaneous injection 20 Units 0.2 mL (20 Units Subcutaneous Given 8/20/23 2310)   insulin lispro (HumaLOG) 100 units/mL subcutaneous injection 6 Units (6 Units Subcutaneous Given 8/21/23 0817)   midodrine (PROAMATINE) tablet 15 mg (15 mg Oral Given 8/21/23 0618)   potassium chloride (K-DUR,KLOR-CON) CR tablet 20 mEq (20 mEq Oral Given 8/21/23 0820)   risperiDONE (RisperDAL) tablet 1 mg (1 mg Oral Given 8/21/23 0152)   sertraline (ZOLOFT) tablet 100 mg (100 mg Oral Given 8/21/23 0820)   sodium chloride tablet 1 g (1 g Oral Given 8/21/23 0150)   acetaminophen (TYLENOL) tablet 650 mg (has no administration in time range)   docusate sodium (COLACE) capsule 100 mg (100 mg Oral Given 8/21/23 0819)   senna (SENOKOT) tablet 17.2 mg (17.2 mg Oral Given 8/21/23 0820)   ondansetron (ZOFRAN) injection 4 mg (has no administration in time range)   calcium carbonate (TUMS) chewable tablet 1,000 mg (has no administration in time range)   insulin lispro (HumaLOG) 100 units/mL subcutaneous injection 1-6 Units (2 Units Subcutaneous Given 8/21/23 0618)   insulin lispro (HumaLOG) 100 units/mL subcutaneous injection 1-6 Units (5 Units Subcutaneous Given 8/20/23 2308)   sodium chloride 0.9 % bolus 1,000 mL (0 mL Intravenous Stopped 8/20/23 1659)   acetaminophen (TYLENOL) tablet 975 mg (975 mg Oral Given 8/20/23 1551)   iohexol (OMNIPAQUE) 350 MG/ML injection (SINGLE-DOSE) 100 mL (100 mL Intravenous Given 8/20/23 1733)       Diagnostic Studies  Results Reviewed     Procedure Component Value Units Date/Time    Fingerstick Glucose (POCT) [[id number]]  (Abnormal) Collected: 08/20/23 2013    Lab Status: Final result Updated: 08/20/23 2108     POC Glucose 210 mg/dl     HS Troponin I 4hr [[id number]]  (Normal) Collected: 08/20/23 2011    Lab Status: Final result Specimen: Blood from Arm, Right Updated: 08/20/23 2046     hs TnI 4hr 20 ng/L      Delta 4hr hsTnI -2 ng/L     HS Troponin I 2hr [[id number]]  (Normal) Collected: 08/20/23 1806    Lab Status: Final result Specimen: Blood from Arm, Right Updated: 08/20/23 1836     hs TnI 2hr 16 ng/L      Delta 2hr hsTnI -6 ng/L     Cortisol [[id number]] Collected: 08/20/23 1555    Lab Status: Final result Specimen: Blood from Arm, Right Updated: 08/20/23 1646     Cortisol, Random 19.3 ug/dL     HS Troponin 0hr (reflex protocol) [[id number]]  (Normal) Collected: 08/20/23 1555    Lab Status: Final result Specimen: Blood from Arm, Right Updated: 08/20/23 1630     hs TnI 0hr 22 ng/L     Comprehensive metabolic panel [[id number]]  (Abnormal) Collected: 08/20/23 1555    Lab Status: Final result Specimen: Blood from Arm, Right Updated: 08/20/23 1625     Sodium 138 mmol/L      Potassium 4.4 mmol/L      Chloride 110 mmol/L      CO2 23 mmol/L      ANION GAP 5 mmol/L      BUN 26 mg/dL      Creatinine 1.63 mg/dL      Glucose 278 mg/dL      Calcium 8.6 mg/dL      AST 22 U/L      ALT 31 U/L      Alkaline Phosphatase 86 U/L      Total Protein 6.7 g/dL      Albumin 3.5 g/dL      Total Bilirubin 0.39 mg/dL      eGFR 43 ml/min/1.73sq m     Narrative:      [place] guidelines for Chronic Kidney Disease (CKD):   •  Stage 1 with normal or high GFR (GFR > 90 mL/min/1.73 square meters)  •  Stage 2 Mild CKD (GFR = 60-89 mL/min/1.73 square meters)  •  Stage 3A Moderate CKD (GFR = 45-59 mL/min/1.73 square meters)  •  Stage 3B Moderate CKD (GFR = 30-44 mL/min/1.73 square meters)  •  Stage 4 Severe CKD (GFR = 15-29 mL/min/1.73 square meters)  •  Stage 5 End Stage CKD (GFR <15 mL/min/1.73 square meters)  Note: GFR calculation is accurate only with a steady state creatinine    CBC and differential [[id number]]  (Abnormal) Collected: 08/20/23 8342    Lab Status: Final result Specimen: Blood from Arm, Right Updated: 08/20/23 1603     WBC 9.18 Thousand/uL      RBC 3.93 Million/uL      Hemoglobin 11.5 g/dL      Hematocrit 35.8 %      MCV 91 fL      MCH 29.3 pg      MCHC 32.1 g/dL      RDW 16.7 %      MPV 11.3 fL      Platelets 470 Thousands/uL      nRBC 0 /100 WBCs      Neutrophils Relative 78 %      Immat GRANS % 1 %      Lymphocytes Relative 11 %      Monocytes Relative 10 %      Eosinophils Relative 0 %      Basophils Relative 0 %      Neutrophils Absolute 7.12 Thousands/µL      Immature Grans Absolute 0.07 Thousand/uL      Lymphocytes Absolute 1.01 Thousands/µL      Monocytes Absolute 0.92 Thousand/µL      Eosinophils Absolute 0.04 Thousand/µL      Basophils Absolute 0.02 Thousands/µL     Fingerstick Glucose (POCT) [[id number]]  (Abnormal) Collected: 08/20/23 1543    Lab Status: Final result Updated: 08/20/23 1550     POC Glucose 276 mg/dl                  CT head without contrast   Final Result by Apple Gomes MD (08/20 1853)      No acute intracranial abnormality. Stable chronic microangiopathic changes and lacunar infarcts within the brain. Workstation performed: [id number]         CT abdomen pelvis with contrast   Final Result by Apple Gomes MD (08/20 1853)      No evidence of acute traumatic injury to the abdomen or pelvis. Workstation performed: [id number]         CT recon only lumbar spine (No Charge)   Final Result by Apple Gomes MD (08/20 1853)      No fracture or traumatic subluxation. Workstation performed: [id number]         XR chest 1 view portable   Final Result by Jackie Hicks MD (08/21 0912)      No acute cardiopulmonary disease. Workstation performed: [id number]               Procedures  Procedures      ED Course  ED Course as of 08/21/23 1029   Sun Aug 20, 2023   1550 Procedure Note: EKG  Date/Time: 08/20/23 3:50 PM   Interpreted by: Adam Abraham   Indications / Diagnosis: falls  ECG reviewed by me, the ED Provider: yes   The EKG demonstrates:  Rhythm: normal sinus  Intervals: normal intervals  Axis: normal axis  QRS/Blocks: normal QRS  ST Changes: No acute ST Changes, no STD/RADHA, nonspecific t wave changes similar to previous   1613 Hemoglobin(!): 11.5  At/above baseline   1646 Creatinine(!): 1.63  From baseline of approx 1                             SBIRT 22yo+    Flowsheet Row Most Recent Value   Initial Alcohol Screen: US AUDIT-C     1. How often do you have a drink containing alcohol? 0 Filed at: 08/20/2023 1525   2. How many drinks containing alcohol do you have on a typical day you are drinking? 0 Filed at: 08/20/2023 1525   3a. Male UNDER 65: How often do you have five or more drinks on one occasion? 0 Filed at: 08/20/2023 1525   3b. FEMALE Any Age, or MALE 65+: How often do you have 4 or more drinks on one occassion?  0 Filed at: 08/20/2023 1522 Audit-C Score 0 Filed at: 08/20/2023 1529   OLYA: How many times in the past year have you. .. Used an illegal drug or used a prescription medication for non-medical reasons? Never Filed at: 08/20/2023 3358                Medical Decision Making  Patient is a 80-year-old male presenting for evaluation of multiple falls. Based on history evaluation, differential diagnosis includes but is not limited to: Orthostatic hypotension, adrenal insufficiency, dehydration, arrhythmia, electrolyte disturbance, anemia, acute intracranial bleed, intra-abdominal trauma. Plan: CBC, CMP, troponin, cortisol, ECG, CT head, CT abdomen pelvis with lumbar recons, fluids, reassessment    Labs remarkable for an VISHAL. Otherwise unremarkable. ECG without active ischemia, STEMI, or concerning arrhythmia. Imaging without acute emergent abnormalities on radiology interpretation and my independent review. Suspect that the patient's multiple falls as well as hypotension at home along with VISHAL are likely in the setting of some dehydration compounded by his known history of orthostatic hypotension. Given his multiple falls concern for his ability to safely ambulate at home. Will admit to medicine for management of his VISHAL as well as PT/OT evaluation to ensure the patient can safely be discharged home. Patient seems to understand this plan and is agreeable. All questions answered. Patient admitted. I independently reviewed this patient's chart. Considered his chronic medical conditions including his history of adrenal insufficiency as well as orthostatic hypotension in my medical decision making. I obtained historical information from the patient's daughter not otherwise provided to me by the patient himself. Amount and/or Complexity of Data Reviewed  Labs: ordered. Decision-making details documented in ED Course. Radiology: ordered. Risk  OTC drugs. Prescription drug management.   Decision regarding hospitalization. Disposition  Final diagnoses:   Fall, initial encounter   VISHAL (acute kidney injury) [facility])     Time reflects when diagnosis was documented in both MDM as applicable and the Disposition within this note     Time User Action Codes Description Comment    8/20/2023  7:22  Vladimir Naik, [address] [U19. RGTJ] Fall, initial encounter     8/20/2023  7:22  Vladimir Naik, [address] [N17.9] VISHAL (acute kidney injury) [facility])       ED Disposition     ED Disposition   Admit    Condition   Stable    Date/Time   Sun Aug 20, 2023  7:22 PM    Comment   Case was discussed with CASSANDRA and the patient's admission status was agreed to be Admission Status: inpatient status to the service of Dr. Sudha Floyd    None         Current Discharge Medication List      START taking these medications    Details   Eliquis 5 MG TAKE 1 TABLET BY MOUTH TWICE A DAY  Qty: 60 tablet, Refills: 1    Comments: DX Code Needed  . Associated Diagnoses: Type 2 MI (myocardial infarction) ([address])         CONTINUE these medications which have NOT CHANGED    Details   aspirin 81 mg chewable tablet Chew 1 tablet (81 mg total) daily  Qty: 30 tablet, Refills: 0    Associated Diagnoses: Cerebrovascular accident (CVA) ([address])      atorvastatin (LIPITOR) 10 mg tablet Take 1 tablet by mouth daily      !! BD Pen Needle Blessing 2nd Gen 32G X 4 MM MISC FOR USE WITH INSULIN PEN. PHARMACY MAY DISPENSE BRAND COVERED BY INSURANCE. Qty: 100 each, Refills: 3    Comments: DX Code Needed  .   Associated Diagnoses: Insulin dependent type 1 diabetes mellitus (HCC)      calcitriol (ROCALTROL) 0.25 mcg capsule TAKE 1 CAPSULE BY MOUTH EVERY DAY  Qty: 90 capsule, Refills: 1    Associated Diagnoses: Stage 5 chronic kidney disease not on chronic dialysis (HCC)      Continuous Blood Gluc  (Dexcom G6 ) STEPHEN Use 1 Units continuous  Qty: 1 each, Refills: 0    Associated Diagnoses: Type 1 diabetes mellitus with hyperglycemia (HCC)      Continuous Blood Gluc Sensor (Dexcom G6 Sensor) MISC Use 1 each every 10 days  Qty: 3 each, Refills: 2    Associated Diagnoses: Type 1 diabetes mellitus with hyperglycemia (HCC)      Continuous Blood Gluc Transmit (Dexcom G6 Transmitter) MISC 1 each every 3 months  Qty: 1 each, Refills: 2    Associated Diagnoses: Type 1 diabetes mellitus with hyperglycemia (HCC)      cyanocobalamin (VITAMIN B-12) 100 mcg tablet Take by mouth daily      ergocalciferol (VITAMIN D2) 50,000 units Take 1 capsule (50,000 Units total) by mouth once a week  Qty: 12 capsule, Refills: 0    Associated Diagnoses: Vitamin D deficiency      ferrous sulfate 324 (65 Fe) mg TAKE 1 TABLET (324 MG TOTAL) BY MOUTH DAILY BEFORE BREAKFAST  Qty: 90 tablet, Refills: 2    Associated Diagnoses: Iron deficiency anemia, unspecified      fludrocortisone (FLORINEF) 0.1 mg tablet TAKE 2 TABLETS BY MOUTH DAILY. Qty: 60 tablet, Refills: 2    Associated Diagnoses: Adrenal insufficiency ([address])      ! ! hydrocortisone (CORTEF) 5 mg tablet Take 20mg(4 tabs) on waking up and  15mg(3 tabs) in evening  Qty: 180 tablet, Refills: 1    Associated Diagnoses: Adrenal insufficiency ([address])      ! ! hydrocortisone (CORTEF) 5 mg tablet Take 4 tabs in AM and 3 tabs in PM  Qty: 180 tablet, Refills: 1    Comments: Adrenal insufficiency  Associated Diagnoses: Adrenal insufficiency (HCC)      insulin glargine (Toujeo SoloStar) 300 units/mL CONCENTRATED U-300 injection pen (1-unit dial) Inject 16 Units under the skin daily at bedtime  Qty: 15 mL, Refills: 0    Associated Diagnoses: Insulin dependent type 1 diabetes mellitus (HCC)      insulin lispro (HumaLOG) 100 units/mL injection pen Use 6 units of breakfast, 7 units with lunch and 7 units with dinner +1 unit per 50 above 150 mg/dL; maximum daily dose 40 units  Qty: 15 mL, Refills: 0    Associated Diagnoses: Insulin dependent type 1 diabetes mellitus ([address])      ! !  Insulin Pen Needle (BD Pen Needle Blessing 2nd Gen) 32G X 4 MM MISC For use with insulin pen. Pharmacy may dispense brand covered by insurance. Qty: 100 each, Refills: 0    Associated Diagnoses: Insulin dependent type 1 diabetes mellitus (HCC)      midodrine (PROAMATINE) 10 MG tablet TAKE 1 AND 1/2 TABLETS (15 MG TOTAL) BY MOUTH 3 (THREE) TIMES A DAY BEFORE MEALS  Qty: 405 tablet, Refills: 4    Associated Diagnoses: Syncope      potassium chloride (Klor-Con) 10 mEq tablet Take 20 mEq by mouth 2 (two) times a day      risperiDONE (RisperDAL) 1 mg tablet Take 1 tablet by mouth 2 (two) times a day      sertraline (ZOLOFT) 100 mg tablet Take 100 mg by mouth daily      sodium chloride 1 g tablet Take 1 tablet (1 g total) by mouth 3 (three) times a day  Qty: 270 tablet, Refills: 3    Associated Diagnoses: Orthostatic hypotension      sodium chloride, concentrated, 2.5 MEQ/ML Take 1 g by mouth Three times a day       !! - Potential duplicate medications found. Please discuss with provider. No discharge procedures on file. PDMP Review       Value Time User    PDMP Reviewed  Yes 6/25/2023  9:46 PM Antoinette Worrell DO           ED Provider  Attending physically available and evaluated Chery Cedeno. I managed the patient along with the ED Attending.     Electronically Signed by         Haydee Shen DO  08/21/23 6978

## 2023-08-20 NOTE — ED ATTENDING ATTESTATION
8/20/2023  I, Trinidad Irizarry MD, saw and evaluated the patient. I have discussed the patient with the resident/non-physician practitioner and agree with the resident's/non-physician practitioner's findings, Plan of Care, and MDM as documented in the resident's/non-physician practitioner's note, except where noted. All available labs and Radiology studies were reviewed. I was present for key portions of any procedure(s) performed by the resident/non-physician practitioner and I was immediately available to provide assistance. At this point I agree with the current assessment done in the Emergency Department.   I have conducted an independent evaluation of this patient a history and physical is as follows:  Pt is on eloquis and adrenal insufficiency Pt over past couple days has had low BP readings to 60s and has been getting salt tabs no change in steroids and midodrine Pt has had 2 falls onto buttocks after standing not hitting head  Pt only co of ha no cp no abd pain no fevers no recent illness PE: alert heart reg lungs clear neck nontender abrasion noted to paravertebral area of thoracic spine abd soft nontender neuro nonfocal MDM: will do ct head abd chest steroids random cortisol labs fluids likely admit  ED Course         Critical Care Time  Procedures

## 2023-08-21 VITALS
HEIGHT: 64 IN | DIASTOLIC BLOOD PRESSURE: 60 MMHG | TEMPERATURE: 97.9 F | OXYGEN SATURATION: 100 % | HEART RATE: 48 BPM | RESPIRATION RATE: 18 BRPM | SYSTOLIC BLOOD PRESSURE: 120 MMHG | BODY MASS INDEX: 31.73 KG/M2 | WEIGHT: 185.85 LBS

## 2023-08-21 LAB
ALBUMIN SERPL BCP-MCNC: 2.8 G/DL (ref 3.5–5)
ANION GAP SERPL CALCULATED.3IONS-SCNC: 7 MMOL/L
BUN SERPL-MCNC: 15 MG/DL (ref 5–25)
CALCIUM ALBUM COR SERPL-MCNC: 8.5 MG/DL (ref 8.3–10.1)
CALCIUM SERPL-MCNC: 7.5 MG/DL (ref 8.3–10.1)
CHLORIDE SERPL-SCNC: 113 MMOL/L (ref 96–108)
CO2 SERPL-SCNC: 23 MMOL/L (ref 21–32)
CREAT SERPL-MCNC: 0.97 MG/DL (ref 0.6–1.3)
ERYTHROCYTE [DISTWIDTH] IN BLOOD BY AUTOMATED COUNT: 16.7 % (ref 11.6–15.1)
GFR SERPL CREATININE-BSD FRML MDRD: 81 ML/MIN/1.73SQ M
GLUCOSE SERPL-MCNC: 165 MG/DL (ref 65–140)
GLUCOSE SERPL-MCNC: 194 MG/DL (ref 65–140)
GLUCOSE SERPL-MCNC: 198 MG/DL (ref 65–140)
HCT VFR BLD AUTO: 30.6 % (ref 36.5–49.3)
HGB BLD-MCNC: 9.7 G/DL (ref 12–17)
MAGNESIUM SERPL-MCNC: 1.6 MG/DL (ref 1.6–2.6)
MCH RBC QN AUTO: 29.3 PG (ref 26.8–34.3)
MCHC RBC AUTO-ENTMCNC: 31.7 G/DL (ref 31.4–37.4)
MCV RBC AUTO: 92 FL (ref 82–98)
PHOSPHATE SERPL-MCNC: 3 MG/DL (ref 2.3–4.1)
PLATELET # BLD AUTO: 125 THOUSANDS/UL (ref 149–390)
PMV BLD AUTO: 11 FL (ref 8.9–12.7)
POTASSIUM SERPL-SCNC: 4 MMOL/L (ref 3.5–5.3)
RBC # BLD AUTO: 3.31 MILLION/UL (ref 3.88–5.62)
SODIUM SERPL-SCNC: 143 MMOL/L (ref 135–147)
WBC # BLD AUTO: 6.87 THOUSAND/UL (ref 4.31–10.16)

## 2023-08-21 PROCEDURE — 85027 COMPLETE CBC AUTOMATED: CPT | Performed by: STUDENT IN AN ORGANIZED HEALTH CARE EDUCATION/TRAINING PROGRAM

## 2023-08-21 PROCEDURE — 82948 REAGENT STRIP/BLOOD GLUCOSE: CPT

## 2023-08-21 PROCEDURE — 80069 RENAL FUNCTION PANEL: CPT | Performed by: STUDENT IN AN ORGANIZED HEALTH CARE EDUCATION/TRAINING PROGRAM

## 2023-08-21 PROCEDURE — 83735 ASSAY OF MAGNESIUM: CPT | Performed by: STUDENT IN AN ORGANIZED HEALTH CARE EDUCATION/TRAINING PROGRAM

## 2023-08-21 PROCEDURE — 99239 HOSP IP/OBS DSCHRG MGMT >30: CPT | Performed by: STUDENT IN AN ORGANIZED HEALTH CARE EDUCATION/TRAINING PROGRAM

## 2023-08-21 RX ORDER — INSULIN GLARGINE 300 U/ML
20 INJECTION, SOLUTION SUBCUTANEOUS
Qty: 6 ML | Refills: 0 | Status: SHIPPED | OUTPATIENT
Start: 2023-08-21

## 2023-08-21 RX ORDER — LANOLIN ALCOHOL/MO/W.PET/CERES
800 CREAM (GRAM) TOPICAL ONCE
Status: COMPLETED | OUTPATIENT
Start: 2023-08-21 | End: 2023-08-21

## 2023-08-21 RX ORDER — MAGNESIUM SULFATE HEPTAHYDRATE 40 MG/ML
2 INJECTION, SOLUTION INTRAVENOUS ONCE
Status: DISCONTINUED | OUTPATIENT
Start: 2023-08-21 | End: 2023-08-21

## 2023-08-21 RX ADMIN — RISPERIDONE 1 MG: 1 TABLET ORAL at 01:52

## 2023-08-21 RX ADMIN — DOCUSATE SODIUM 100 MG: 100 CAPSULE, LIQUID FILLED ORAL at 08:19

## 2023-08-21 RX ADMIN — SERTRALINE 100 MG: 100 TABLET, FILM COATED ORAL at 08:20

## 2023-08-21 RX ADMIN — ASPIRIN 81 MG CHEWABLE TABLET 81 MG: 81 TABLET CHEWABLE at 08:21

## 2023-08-21 RX ADMIN — FLUDROCORTISONE ACETATE 0.2 MG: 0.1 TABLET ORAL at 08:23

## 2023-08-21 RX ADMIN — INSULIN LISPRO 2 UNITS: 100 INJECTION, SOLUTION INTRAVENOUS; SUBCUTANEOUS at 06:18

## 2023-08-21 RX ADMIN — MIDODRINE HYDROCHLORIDE 15 MG: 5 TABLET ORAL at 06:18

## 2023-08-21 RX ADMIN — HYDROCORTISONE 15 MG: 10 TABLET ORAL at 01:50

## 2023-08-21 RX ADMIN — SENNOSIDES 17.2 MG: 8.6 TABLET, FILM COATED ORAL at 08:20

## 2023-08-21 RX ADMIN — HYDROCORTISONE 20 MG: 20 TABLET ORAL at 08:19

## 2023-08-21 RX ADMIN — MAGNESIUM OXIDE TAB 400 MG (241.3 MG ELEMENTAL MG) 800 MG: 400 (241.3 MG) TAB at 12:28

## 2023-08-21 RX ADMIN — FERROUS SULFATE TAB 325 MG (65 MG ELEMENTAL FE) 325 MG: 325 (65 FE) TAB at 08:19

## 2023-08-21 RX ADMIN — INSULIN LISPRO 6 UNITS: 100 INJECTION, SOLUTION INTRAVENOUS; SUBCUTANEOUS at 11:56

## 2023-08-21 RX ADMIN — INSULIN LISPRO 6 UNITS: 100 INJECTION, SOLUTION INTRAVENOUS; SUBCUTANEOUS at 08:17

## 2023-08-21 RX ADMIN — RISPERIDONE 1 MG: 1 TABLET ORAL at 11:58

## 2023-08-21 RX ADMIN — MIDODRINE HYDROCHLORIDE 15 MG: 5 TABLET ORAL at 11:59

## 2023-08-21 RX ADMIN — SODIUM CHLORIDE 1 G: 1 TABLET ORAL at 01:50

## 2023-08-21 RX ADMIN — SODIUM CHLORIDE 1 G: 1 TABLET ORAL at 11:58

## 2023-08-21 RX ADMIN — CALCITRIOL CAPSULES 0.25 MCG 0.25 MCG: 0.25 CAPSULE ORAL at 08:20

## 2023-08-21 RX ADMIN — APIXABAN 5 MG: 5 TABLET, FILM COATED ORAL at 08:21

## 2023-08-21 RX ADMIN — POTASSIUM CHLORIDE 20 MEQ: 750 TABLET, EXTENDED RELEASE ORAL at 08:20

## 2023-08-21 RX ADMIN — INSULIN LISPRO 1 UNITS: 100 INJECTION, SOLUTION INTRAVENOUS; SUBCUTANEOUS at 11:56

## 2023-08-21 RX ADMIN — ATORVASTATIN CALCIUM 10 MG: 10 TABLET, FILM COATED ORAL at 08:21

## 2023-08-21 RX ADMIN — VITAM B12 100 MCG: 100 TAB at 08:21

## 2023-08-21 NOTE — ASSESSMENT & PLAN NOTE
· For the past few days he has been feeling dizziness and BP have been dropping into the 60s at home. · Daughter notes that he was drinking well but glucose checks have been elevated, up into the 500s  · Suspect hyperglycemia causing diuresis and dehydration  · IV hydration with isolyte  · Avoid NSAIDS and nephrotoxins. · Cr improved to baseline this morning  · Patient feeling well will d/c  · Pt's daughter states glucose readings at home were 500. Suspect hyperglycemia was cause of dehydration.

## 2023-08-21 NOTE — UTILIZATION REVIEW
Initial Clinical Review    Admission: Date/Time/Statement:   Admission Orders (From admission, onward)     Ordered        08/20/23 1930  Place in Observation  Once                      Orders Placed This Encounter   Procedures   • Place in Observation     Standing Status:   Standing     Number of Occurrences:   1     Order Specific Question:   Level of Care     Answer:   Med Surg [16]     ED Arrival Information     Expected   -    Arrival   8/20/2023 15:17    Acuity   Emergent            Means of arrival   Walk-In    Escorted by   Family Member    Service   Hospitalist    Admission type   Emergency            Arrival complaint   headache           Chief Complaint   Patient presents with   • Multiple Falls     Dizzy and fell X2 today, witnessed with no HS. C/o HA that started today. Hx stroke. Low BP 08V systolic at home       Initial Presentation: 79 y.o. male with a PMH of orthostatic hypotension who presents with dizziness. For the past few days he reports dizziness and lightheadedness. His daughter at the bedside reports that his SBP while standing was in the 60s. Symptoms are better when lying down. He presented to the ED today and was found to be in VISHAL. He denies vomiting or diarrhea. Appetite has been adequate and fluid intake has been stable. His daughter reports that his blood glucose has been elevated recently up to the 500s and he reports excess urination.     ADMIT OBSERVATION STATUS    Date:     Day 2:        Date:     Day 3: Has surpassed a 2nd midnight with active treatments and services, which include         ED Triage Vitals [08/20/23 1522]   Temperature Pulse Respirations Blood Pressure SpO2   98 °F (36.7 °C) 73 18 96/61 96 %      Temp Source Heart Rate Source Patient Position - Orthostatic VS BP Location FiO2 (%)   Oral Monitor -- -- --      Pain Score       8          Wt Readings from Last 1 Encounters:   06/26/23 84.3 kg (185 lb 13.6 oz)     Additional Vital Signs:         Pertinent Labs/Diagnostic Test Results:   CT head without contrast   Final Result by Edgar Olguin MD (08/20 1853)      No acute intracranial abnormality. Stable chronic microangiopathic changes and lacunar infarcts within the brain. Workstation performed: [id number]         CT abdomen pelvis with contrast   Final Result by Edgar Olguin MD (08/20 1853)      No evidence of acute traumatic injury to the abdomen or pelvis. Workstation performed: [id number]         CT recon only lumbar spine (No Charge)   Final Result by Edgar Olguin MD (08/20 1853)      No fracture or traumatic subluxation.             Workstation performed: [id number]         XR chest 1 view portable    (Results Pending)         Results from last 7 days   Lab Units 08/20/23  1555   WBC Thousand/uL 9.18   HEMOGLOBIN g/dL 11.5*   HEMATOCRIT % 35.8*   PLATELETS Thousands/uL 159   NEUTROS ABS Thousands/µL 7.12         Results from last 7 days   Lab Units 08/20/23  1555   SODIUM mmol/L 138   POTASSIUM mmol/L 4.4   CHLORIDE mmol/L 110*   CO2 mmol/L 23   ANION GAP mmol/L 5   BUN mg/dL 26*   CREATININE mg/dL 1.63*   EGFR ml/min/1.73sq m 43   CALCIUM mg/dL 8.6     Results from last 7 days   Lab Units 08/20/23  1555   AST U/L 22   ALT U/L 31   ALK PHOS U/L 86   TOTAL PROTEIN g/dL 6.7   ALBUMIN g/dL 3.5   TOTAL BILIRUBIN mg/dL 0.39     Results from last 7 days   Lab Units 08/21/23  0508 08/20/23  2142 08/20/23 2013 08/20/23  1543   POC GLUCOSE mg/dl 198* 325* 210* 276*     Results from last 7 days   Lab Units 08/20/23  1555   GLUCOSE RANDOM mg/dL 278*             No results found for: "BETA-HYDROXYBUTYRATE"                   Results from last 7 days   Lab Units 08/20/23 2011 08/20/23  1806 08/20/23  1555   HS TNI 0HR ng/L  --   --  22   HS TNI 2HR ng/L  --  16  --    HSTNI D2 ng/L  --  -6  --    HS TNI 4HR ng/L 20  --   --    HSTNI D4 ng/L -2  --   -- ED Treatment:   Medication Administration from 08/20/2023 1517 to 08/20/2023 2124       Date/Time Order Dose Route Action Action by Comments     08/20/2023 1659 EDT sodium chloride 0.9 % bolus 1,000 mL 0 mL Intravenous Stopped Bassem Llamas, RN --     08/20/2023 1547 EDT sodium chloride 0.9 % bolus 1,000 mL 1,000 mL Intravenous New Bag Adilia Crowe, RN --     08/20/2023 1551 EDT acetaminophen (TYLENOL) tablet 975 mg 975 mg Oral Given Adilia Crowe, RN --     08/20/2023 1733 EDT iohexol (OMNIPAQUE) 350 MG/ML injection (SINGLE-DOSE) 100 mL 100 mL Intravenous Given March Sines --        Past Medical History:   Diagnosis Date   • Diabetes mellitus ([address] )    • Obesity, morbid ([address]) 11/14/2022   • Stroke [facility])      Present on Admission:  • Adrenal insufficiency   • Anemia of chronic disease  • Orthostatic hypotension  • Paroxysmal atrial fibrillation   • Type 1 diabetes mellitus with hyperglycemia (HCC)      Admitting Diagnosis: VISHAL (acute kidney injury) ([address]) [N17.9]  Fall, initial encounter [W19. XXXA]  Age/Sex: 79 y.o. male  Admission Orders:  Scheduled Medications:  apixaban, 5 mg, Oral, BID  aspirin, 81 mg, Oral, Daily  atorvastatin, 10 mg, Oral, Daily  calcitriol, 0.25 mcg, Oral, Daily  cyanocobalamin, 100 mcg, Oral, Daily  docusate sodium, 100 mg, Oral, BID  ferrous sulfate, 325 mg, Oral, Daily With Breakfast  fludrocortisone, 0.2 mg, Oral, Daily  hydrocortisone, 15 mg, Oral, QPM  hydrocortisone, 20 mg, Oral, Daily  insulin glargine, 20 Units, Subcutaneous, HS  insulin lispro, 1-6 Units, Subcutaneous, TID AC  insulin lispro, 1-6 Units, Subcutaneous, HS  insulin lispro, 6 Units, Subcutaneous, TID With Meals  midodrine, 15 mg, Oral, TID AC  potassium chloride, 20 mEq, Oral, BID  risperiDONE, 1 mg, Oral, BID  senna, 2 tablet, Oral, Daily  sertraline, 100 mg, Oral, Daily  sodium chloride, 1 g, Oral, TID      Continuous IV Infusions:  multi-electrolyte, 100 mL/hr, Intravenous, Continuous      PRN Meds:  acetaminophen, 650 mg, Oral, Q6H PRN  calcium carbonate, 1,000 mg, Oral, Daily PRN  ondansetron, 4 mg, Intravenous, Q6H PRN        None    Network Utilization Review Department  ATTENTION: Please call with any questions or concerns to [phone] and carefully listen to the prompts so that you are directed to the right person. All voicemails are confidential.  Dariana Polo all requests for admission clinical reviews, approved or denied determinations and any other requests to dedicated fax number below belonging to the campus where the patient is receiving treatment.  List of dedicated fax numbers for the Facilities:  [place] DENIALS (Administrative/Medical Necessity) [phone] [facility] [phone]   [address] [place] [address] [phone]   [facility] [address] [facility] [phone]   [id number] [place] [address] [phone]   [address] [facility] [address] [facility] [address] [phone]       D

## 2023-08-21 NOTE — ASSESSMENT & PLAN NOTE
· Rate controlled, no AVN blockers at this time  · Continue Xarelto for [facility]

## 2023-08-21 NOTE — ASSESSMENT & PLAN NOTE
· History of orthostatic hypotension on midodrine and florinef, continue home regimen  · Continue NaCl tabs  · Symptoms and numbers improved with IV hydration

## 2023-08-21 NOTE — ASSESSMENT & PLAN NOTE
· History of orthostatic hypotension on midodrine and florinef, continue home regimen  · Continue NaCl tabs  · IV hydration as above

## 2023-08-21 NOTE — DISCHARGE INSTR - AVS FIRST PAGE
You came in with dizziness due to dehydration and acute kidney injury  Most likely due to elevated blood sugar levels  Follow up with endocrinology  I have increased your Glargine to 20 units at night  I suspect better glucose control will prevent similar symptoms in the future and adequate water intake.

## 2023-08-21 NOTE — ASSESSMENT & PLAN NOTE
· Follows with endocrinology as an outpatient  · Continue fludrocortisone and hydrocortisone at previous dose  · Serum cortisol is WNL in the ED

## 2023-08-21 NOTE — ASSESSMENT & PLAN NOTE
Lab Results   Component Value Date    HGBA1C 8.5 (H) 07/03/2023       Recent Labs     08/20/23  1543   POCGLU 276*       Blood Sugar Average: Last 72 hrs:  · (P) 276   · Glucose has been uncontrolled in the output setting, up to 500s per daughter  · Restart basal bolus insulin and adjust PRN  · Sliding scale coverage  · Diabetic diet

## 2023-08-21 NOTE — ASSESSMENT & PLAN NOTE
Lab Results   Component Value Date    HGBA1C 8.5 (H) 07/03/2023       Recent Labs     08/20/23 2013 08/20/23  2142 08/21/23  0508 08/21/23  1134   POCGLU 210* 325* 198* 165*       Blood Sugar Average: Last 72 hrs:  · (P) 234.8   · Glucose has been uncontrolled in the output setting, up to 500s per daughter  · Restart basal bolus insulin and adjust PRN  · Sliding scale coverage  · Diabetic diet  · Will d/c on increased glargine 20 units qhs  · F/U with endocrinology

## 2023-08-21 NOTE — ASSESSMENT & PLAN NOTE
· For the past few days he has been feeling dizziness and BP have been dropping into the 60s at home. · Daughter notes that he was drinking well but glucose checks have been elevated, up into the 500s  · Suspect hyperglycemia causing diuresis and dehydration  · IV hydration with isolyte  · Monitor orthostatic Bps  · Monitor urine ouput  · Avoid NSAIDS and nephrotoxins.   · BMP in the AM

## 2023-08-21 NOTE — ASSESSMENT & PLAN NOTE
· Per records has chronic iron deficiency  · Suspect a mild degree of hemoconcentration today baseline appears to be around 10

## 2023-08-21 NOTE — DISCHARGE SUMMARY
[address] [facility]  Discharge- Kris Fieldst 1955, 79 y.o. male MRN: [id number]  Unit/Bed#: Zan Ramos 212-01 Encounter: [id number]  Primary Care Provider: Ria Arizmendi MD   Date and time admitted to hospital: 8/20/2023  3:27 PM    * VISHAL (acute kidney injury) [facility])  Assessment & Plan  · For the past few days he has been feeling dizziness and BP have been dropping into the 60s at home. · Daughter notes that he was drinking well but glucose checks have been elevated, up into the 500s  · Suspect hyperglycemia causing diuresis and dehydration  · IV hydration with isolyte  · Avoid NSAIDS and nephrotoxins. · Cr improved to baseline this morning  · Patient feeling well will d/c  · Pt's daughter states glucose readings at home were 500. Suspect hyperglycemia was cause of dehydration.      Type 1 diabetes mellitus with hyperglycemia [facility])  Assessment & Plan  Lab Results   Component Value Date    HGBA1C 8.5 (H) 07/03/2023       Recent Labs     08/20/23 2013 08/20/23  2142 08/21/23  0508 08/21/23  1134   POCGLU 210* 325* 198* 165*       Blood Sugar Average: Last 72 hrs:  · (P) 234.8   · Glucose has been uncontrolled in the output setting, up to 500s per daughter  · Restart basal bolus insulin and adjust PRN  · Sliding scale coverage  · Diabetic diet  · Will d/c on increased glargine 20 units qhs  · F/U with endocrinology     Orthostatic hypotension  Assessment & Plan  · History of orthostatic hypotension on midodrine and florinef, continue home regimen  · Continue NaCl tabs  · Symptoms and numbers improved with IV hydration    Anemia of chronic disease  Assessment & Plan  · Per records has chronic iron deficiency  · Suspect a mild degree of hemoconcentration today baseline appears to be around 10    History of stroke  Assessment & Plan  · Continue aspirin, Eliquis and atorvastatin    Paroxysmal atrial fibrillation   Assessment & Plan  · Rate controlled, no AVN blockers at this time  · Continue Xarelto for [facility] [place]    Adrenal insufficiency   Assessment & Plan  · Follows with endocrinology as an outpatient  · Continue fludrocortisone and hydrocortisone at previous dose  · Serum cortisol is WNL in the ED      Medical Problems     Resolved Problems  Date Reviewed: 8/21/2023   None       Discharging Physician / Practitioner: Dennys Mckoy DO  PCP: Jocelyne Martell MD  Admission Date:   Admission Orders (From admission, onward)     Ordered        08/20/23 1930  Place in Observation  Once                      Discharge Date: 08/21/23    Consultations During Hospital Stay:  · none    Procedures Performed:   · none    Significant Findings / Test Results:   · VISHAL    Incidental Findings:   · See above   · I reviewed the above mentioned incidental findings with the patient and/or family and they expressed understanding. Test Results Pending at Discharge (will require follow up): · None     Outpatient Tests Requested:  · none    Complications:  none    Reason for Admission: VISHAL/Dizziness/Hyperglycemia    Chief Complaint: dizziness     History of Present Illness:  Robert Dowell is a 79 y.o. male with a PMH of orthostatic hypotension who presents with dizziness. For the past few days he reports dizziness and lightheadedness. His daughter at the bedside reports that his SBP while standing was in the 60s. Symptoms are better when lying down. He presented to the ED today and was found to be in VISHAL. He denies vomiting or diarrhea. Appetite has been adequate and fluid intake has been stable. His daughter reports that his blood glucose has been elevated recently up to the 500s and he reports excess urination. Hospital Course:   Robert Dowell is a 79 y.o. male patient who originally presented to the hospital on 8/20/2023 due to dizziness secondary to dehydration. Patient presented with an VISHAL and creatinine of 1.63 baseline is around 0.9.   Patient was given IV fluids overnight and patient's nighttime long-acting insulin was increased to 20 units nightly. Blood glucose this morning and afternoon much better controlled. VISHAL had resolved with IVF along with dizziness. Patient is being discharged on increase glargine of 20 units nightly and advised to follow-up with endocrinology for better glucose control as I suspect the cause of patient's dehydration was secondary to glucose levels in the 500s. Please see above list of diagnoses and related plan for additional information. Condition at Discharge: good    Discharge Day Visit / Exam:   Subjective: Patient states that he feels well currently offers no acute complaints denies any dizziness. Vitals: Blood Pressure: 120/60 (08/21/23 1245)  Pulse: (!) 48 (08/21/23 0727)  Temperature: 97.9 °F (36.6 °C) (08/21/23 0727)  Temp Source: Oral (08/21/23 0727)  Respirations: 18 (08/21/23 0727)  Height: 5' 4" (162.6 cm) (08/21/23 1100)  Weight - Scale: 84.3 kg (185 lb 13.6 oz) (08/21/23 1100)  SpO2: 100 % (08/21/23 0727)  Exam:   Physical Exam  Vitals and nursing note reviewed. Constitutional:       General: He is not in acute distress. Appearance: He is well-developed. HENT:      Head: Normocephalic and atraumatic. Eyes:      Conjunctiva/sclera: Conjunctivae normal.   Cardiovascular:      Rate and Rhythm: Normal rate and regular rhythm. Heart sounds: No murmur heard. Pulmonary:      Effort: Pulmonary effort is normal. No respiratory distress. Breath sounds: Normal breath sounds. Abdominal:      Palpations: Abdomen is soft. Tenderness: There is no abdominal tenderness. Musculoskeletal:         General: No swelling. Cervical back: Neck supple. Skin:     General: Skin is warm and dry. Capillary Refill: Capillary refill takes less than 2 seconds. Neurological:      Mental Status: He is alert. Mental status is at baseline.    Psychiatric:         Mood and Affect: Mood normal.          Discussion with Family: Updated  (daughter) via phone.    Discharge instructions/Information to patient and family:   See after visit summary for information provided to patient and family. Provisions for Follow-Up Care:  See after visit summary for information related to follow-up care and any pertinent home health orders. Disposition:   Home    Planned Readmission: no     Discharge Statement:  I spent 45 minutes discharging the patient. This time was spent on the day of discharge. I had direct contact with the patient on the day of discharge. Greater than 50% of the total time was spent examining patient, answering all patient questions, arranging and discussing plan of care with patient as well as directly providing post-discharge instructions. Additional time then spent on discharge activities. Discharge Medications:  See after visit summary for reconciled discharge medications provided to patient and/or family.       **Please Note: This note may have been constructed using a voice recognition system**

## 2023-08-21 NOTE — H&P
[facility]  H&P  Name: Nito Lackey 79 y.o. male I MRN: [id number]  Unit/Bed#: ED 24 I Date of Admission: 8/20/2023   Date of Service: 8/20/2023 I Hospital Day: 0      Assessment/Plan   * VISHAL (acute kidney injury) ([address])  Assessment & Plan  · For the past few days he has been feeling dizziness and BP have been dropping into the 60s at home. · Daughter notes that he was drinking well but glucose checks have been elevated, up into the 500s  · Suspect hyperglycemia causing diuresis and dehydration  · IV hydration with isolyte  · Monitor orthostatic Bps  · Monitor urine ouput  · Avoid NSAIDS and nephrotoxins.   · BMP in the AM    Type 1 diabetes mellitus with hyperglycemia [facility])  Assessment & Plan  Lab Results   Component Value Date    HGBA1C 8.5 (H) 07/03/2023       Recent Labs     08/20/23  1543   POCGLU 276*       Blood Sugar Average: Last 72 hrs:  · (P) 276   · Glucose has been uncontrolled in the output setting, up to 500s per daughter  · Restart basal bolus insulin and adjust PRN  · Sliding scale coverage  · Diabetic diet    Adrenal insufficiency   Assessment & Plan  · Follows with endocrinology as an outpatient  · Continue fludrocortisone and hydrocortisone at previous dose  · Serum cortisol is WNL in the ED    Orthostatic hypotension  Assessment & Plan  · History of orthostatic hypotension on midodrine and florinef, continue home regimen  · Continue NaCl tabs  · IV hydration as above    Anemia of chronic disease  Assessment & Plan  · Per records has chronic iron deficiency  · Suspect a mild degree of hemoconcentration today baseline appears to be around 10    History of stroke  Assessment & Plan  · Continue aspirin, Eliquis and atorvastatin    Paroxysmal atrial fibrillation   Assessment & Plan  · Rate controlled, no AVN blockers at this time  · Continue Xarelto for [facility]         VTE Pharmacologic Prophylaxis:   Moderate Risk (Score 3-4) - Pharmacological DVT Prophylaxis Ordered: rivaroxaban (Xarelto). Code Status: Level 1 - Full Code   Discussion with family: Updated  (daughter) at bedside. Anticipated Length of Stay: Patient will be admitted on an inpatient basis with an anticipated length of stay of greater than 2 midnights secondary to dehydration, VISHAL. Total Time Spent on Date of Encounter in care of patient: 40 minutes This time was spent on one or more of the following: performing physical exam; counseling and coordination of care; obtaining or reviewing history; documenting in the medical record; reviewing/ordering tests, medications or procedures; communicating with other healthcare professionals and discussing with patient's family/caregivers. Chief Complaint: dizziness    History of Present Illness:  Inessa Polanco is a 79 y.o. male with a PMH of orthostatic hypotension who presents with dizziness. For the past few days he reports dizziness and lightheadedness. His daughter at the bedside reports that his SBP while standing was in the 60s. Symptoms are better when lying down. He presented to the ED today and was found to be in VISHAL. He denies vomiting or diarrhea. Appetite has been adequate and fluid intake has been stable. His daughter reports that his blood glucose has been elevated recently up to the 500s and he reports excess urination. Review of Systems:  Review of Systems   All other systems reviewed and are negative. Past Medical and Surgical History:   Past Medical History:   Diagnosis Date   • Diabetes mellitus ([address])    • Obesity, morbid ([address]) 11/14/2022   • Stroke [facility])        History reviewed. No pertinent surgical history. Meds/Allergies:  Prior to Admission medications    Medication Sig Start Date End Date Taking?  Authorizing Provider   aspirin 81 mg chewable tablet Chew 1 tablet (81 mg total) daily 3/30/23   José Miguel Gonzalez DO   atorvastatin (LIPITOR) 10 mg tablet Take 1 tablet by mouth daily 7/12/22   Historical Provider, MD BD Pen Needle Blessing 2nd Gen 32G X 4 MM MISC FOR USE WITH INSULIN PEN. PHARMACY MAY DISPENSE BRAND COVERED BY INSURANCE. 6/26/23   MD Micheal   calcitriol (ROCALTROL) 0.25 mcg capsule TAKE 1 CAPSULE BY MOUTH EVERY DAY 2/25/23   MD Micheal   Continuous Blood Gluc  (Dexcom G6 ) STEPHEN Use 1 Units continuous 6/16/23   MD Micheal   Continuous Blood Gluc Sensor (Dexcom G6 Sensor) MISC Use 1 each every 10 days 6/16/23   MD Micheal   Continuous Blood Gluc Transmit (Dexcom G6 Transmitter) MISC 1 each every 3 months 6/16/23   MD Micheal   cyanocobalamin (VITAMIN B-12) 100 mcg tablet Take by mouth daily 9/29/22   Pema Miranda MD   Eliquis 5 MG TAKE 1 TABLET BY MOUTH TWICE A DAY 8/20/23   Desiree Echevarria MD   ergocalciferol (VITAMIN D2) 50,000 units Take 1 capsule (50,000 Units total) by mouth once a week 7/6/23   Virginia Galeas MD   ferrous sulfate 324 (65 Fe) mg TAKE 1 TABLET (324 MG TOTAL) BY MOUTH DAILY BEFORE BREAKFAST 6/26/23   Virginia Galeas MD   fludrocortisone (FLORINEF) 0.1 mg tablet TAKE 2 TABLETS BY MOUTH DAILY. 5/29/23   Desiree Echevarria MD   hydrocortisone (CORTEF) 5 mg tablet Take 20mg(4 tabs) on waking up and  15mg(3 tabs) in evening 5/22/23   MD Micheal   hydrocortisone (CORTEF) 5 mg tablet Take 4 tabs in AM and 3 tabs in PM 7/20/23   MD Micheal   insulin glargine (Toujeo SoloStar) 300 units/mL CONCENTRATED U-300 injection pen (1-unit dial) Inject 16 Units under the skin daily at bedtime 6/27/23   Gretel Tilley PA-C   insulin lispro (HumaLOG) 100 units/mL injection pen Use 6 units of breakfast, 7 units with lunch and 7 units with dinner +1 unit per 50 above 150 mg/dL; maximum daily dose 40 units 7/20/23   Milan Lobato MD   Insulin Pen Needle (BD Pen Needle Blessing 2nd Gen) 32G X 4 MM MISC For use with insulin pen. Pharmacy may dispense brand covered by insurance.  10/26/22   MD suresh Matarine (PROAMATINE) 10 MG tablet TAKE 1 AND 1/2 TABLETS (15 MG TOTAL) BY MOUTH 3 (THREE) TIMES A DAY BEFORE MEALS 3/27/23   Mahesh Rajan MD   potassium chloride (Klor-Con) 10 mEq tablet Take 20 mEq by mouth 2 (two) times a day 10/13/22 10/13/23  Historical Provider, MD   risperiDONE (RisperDAL) 1 mg tablet Take 1 tablet by mouth 2 (two) times a day    Historical Provider, MD   sertraline (ZOLOFT) 100 mg tablet Take 100 mg by mouth daily 3/16/22 6/1/23  Historical Provider, MD   sodium chloride 1 g tablet Take 1 tablet (1 g total) by mouth 3 (three) times a day 1/27/23   Mahesh Rajan MD   sodium chloride, concentrated, 2.5 MEQ/ML Take 1 g by mouth Three times a day 2/7/23   Historical Provider, MD   Eliquis 5 MG TAKE 1 TABLET BY MOUTH TWICE A DAY 5/16/23 8/20/23  Mahesh Rajan MD     I have reviewed home medications using recent Epic encounter. Allergies:    Allergies   Allergen Reactions   • Imipramine Other (See Comments)   • Lisinopril Other (See Comments)   • Paroxetine Other (See Comments)   • Penicillins Hives   • Rosiglitazone Other (See Comments)   • Troglitazone Other (See Comments)       Social History:  Marital Status: Single   Occupation: none listed  Patient Pre-hospital Living Situation: Home  Patient Pre-hospital Level of Mobility: walks  Patient Pre-hospital Diet Restrictions: diabetic  Substance Use History:   Social History     Substance and Sexual Activity   Alcohol Use Not Currently   • Alcohol/week: 5.0 standard drinks of alcohol   • Types: 5 Cans of beer per week    Comment: Quit in august     Social History     Tobacco Use   Smoking Status Former   • Packs/day: 0.25   • Years: 30.00   • Total pack years: 7.50   • Types: Cigarettes   Smokeless Tobacco Never     Social History     Substance and Sexual Activity   Drug Use Never       Family History:  Family History   Problem Relation Age of Onset   • Heart disease Mother    • Cancer Father    • Multiple sclerosis Sister        Physical Exam:     Vitals: Blood Pressure: 126/60 (08/20/23 1645)  Pulse: 66 (08/20/23 1645)  Temperature: 98 °F (36.7 °C) (08/20/23 1522)  Temp Source: Oral (08/20/23 1522)  Respirations: 17 (08/20/23 1645)  SpO2: 100 % (08/20/23 1645)    Physical Exam  Constitutional:       General: He is not in acute distress. Appearance: He is obese. He is ill-appearing. He is not toxic-appearing. HENT:      Head: Normocephalic and atraumatic. Nose: Nose normal.      Mouth/Throat:      Mouth: Mucous membranes are dry. Pharynx: Oropharynx is clear. Eyes:      Extraocular Movements: Extraocular movements intact. Cardiovascular:      Rate and Rhythm: Normal rate and regular rhythm. Pulmonary:      Effort: Pulmonary effort is normal.      Breath sounds: No wheezing or rales. Abdominal:      General: There is no distension. Palpations: Abdomen is soft. Tenderness: There is no abdominal tenderness. Musculoskeletal:      Right lower leg: No edema. Left lower leg: No edema. Skin:     General: Skin is warm and dry. Neurological:      Mental Status: He is alert and oriented to person, place, and time. Mental status is at baseline. Cranial Nerves: No cranial nerve deficit.    Psychiatric:         Mood and Affect: Mood normal.         Behavior: Behavior normal.          Additional Data:     Lab Results:  Results from last 7 days   Lab Units 08/20/23  1555   WBC Thousand/uL 9.18   HEMOGLOBIN g/dL 11.5*   HEMATOCRIT % 35.8*   PLATELETS Thousands/uL 159   NEUTROS PCT % 78*   LYMPHS PCT % 11*   MONOS PCT % 10   EOS PCT % 0     Results from last 7 days   Lab Units 08/20/23  1555   SODIUM mmol/L 138   POTASSIUM mmol/L 4.4   CHLORIDE mmol/L 110*   CO2 mmol/L 23   BUN mg/dL 26*   CREATININE mg/dL 1.63*   ANION GAP mmol/L 5   CALCIUM mg/dL 8.6   ALBUMIN g/dL 3.5   TOTAL BILIRUBIN mg/dL 0.39   ALK PHOS U/L 86   ALT U/L 31   AST U/L 22   GLUCOSE RANDOM mg/dL 278*         Results from last 7 days   Lab Units 08/20/23  1543   POC GLUCOSE mg/dl 276*               Lines/Drains:  Invasive Devices     Peripheral Intravenous Line  Duration           Peripheral IV 04/13/23 Left Forearm 129 days    Peripheral IV 08/20/23 Right;Ventral (anterior) Wrist <1 day                    Imaging: No pertinent imaging reviewed. CT head without contrast   Final Result by Andrew Cobos MD (08/20 1853)      No acute intracranial abnormality. Stable chronic microangiopathic changes and lacunar infarcts within the brain. Workstation performed: [id number]         CT abdomen pelvis with contrast   Final Result by Andrew Cobos MD (08/20 1853)      No evidence of acute traumatic injury to the abdomen or pelvis. Workstation performed: [id number]         CT recon only lumbar spine (No Charge)   Final Result by Andrew Cobos MD (08/20 1853)      No fracture or traumatic subluxation. Workstation performed: [id number]         XR chest 1 view portable    (Results Pending)       EKG and Other Studies Reviewed on Admission:   · EKG: NSR. HR 74.    ** Please Note: This note has been constructed using a voice recognition system.  **

## 2023-08-28 ENCOUNTER — TELEPHONE (OUTPATIENT)
Dept: ENDOCRINOLOGY | Facility: CLINIC | Age: 68
End: 2023-08-28

## 2023-08-28 NOTE — TELEPHONE ENCOUNTER
Patient daughter is asking If patient can be switched over the [address] has there has been a couple issue with the G6 and he is almost out of supplies and would like a script for G7 to be sent if able.

## 2023-09-01 DIAGNOSIS — E10.649 TYPE 1 DIABETES MELLITUS WITH HYPOGLYCEMIA AND WITHOUT COMA (HCC): Primary | ICD-10-CM

## 2023-09-01 DIAGNOSIS — E10.649 TYPE 1 DIABETES MELLITUS WITH HYPOGLYCEMIA AND WITHOUT COMA (HCC): ICD-10-CM

## 2023-09-01 RX ORDER — ACYCLOVIR 400 MG/1
1 TABLET ORAL CONTINUOUS
Qty: 1 EACH | Refills: 0 | Status: SHIPPED | OUTPATIENT
Start: 2023-09-01 | End: 2023-09-03

## 2023-09-01 RX ORDER — ACYCLOVIR 400 MG/1
1 TABLET ORAL
Qty: 3 EACH | Refills: 0 | Status: SHIPPED | OUTPATIENT
Start: 2023-09-01 | End: 2023-09-03

## 2023-09-03 RX ORDER — ACYCLOVIR 400 MG/1
1 TABLET ORAL
Qty: 3 EACH | Refills: 0 | Status: SHIPPED | OUTPATIENT
Start: 2023-09-03 | End: 2023-09-05

## 2023-09-03 RX ORDER — ACYCLOVIR 400 MG/1
1 TABLET ORAL CONTINUOUS
Qty: 1 EACH | Refills: 0 | Status: SHIPPED | OUTPATIENT
Start: 2023-09-03 | End: 2023-09-05

## 2023-09-04 DIAGNOSIS — E27.40 ADRENAL INSUFFICIENCY (HCC): ICD-10-CM

## 2023-09-04 DIAGNOSIS — E10.649 TYPE 1 DIABETES MELLITUS WITH HYPOGLYCEMIA AND WITHOUT COMA (HCC): ICD-10-CM

## 2023-09-04 DIAGNOSIS — N18.5 STAGE 5 CHRONIC KIDNEY DISEASE NOT ON CHRONIC DIALYSIS (HCC): ICD-10-CM

## 2023-09-05 RX ORDER — FLUDROCORTISONE ACETATE 0.1 MG/1
0.2 TABLET ORAL DAILY
Qty: 60 TABLET | Refills: 0 | Status: SHIPPED | OUTPATIENT
Start: 2023-09-05

## 2023-09-05 RX ORDER — HYDROCORTISONE 5 MG/1
TABLET ORAL
Qty: 180 TABLET | Refills: 0 | Status: SHIPPED | OUTPATIENT
Start: 2023-09-05

## 2023-09-05 RX ORDER — ACYCLOVIR 400 MG/1
1 TABLET ORAL
Qty: 3 EACH | Refills: 0 | Status: SHIPPED | OUTPATIENT
Start: 2023-09-05 | End: 2023-09-08

## 2023-09-05 RX ORDER — ACYCLOVIR 400 MG/1
1 TABLET ORAL CONTINUOUS
Qty: 1 EACH | Refills: 0 | Status: SHIPPED | OUTPATIENT
Start: 2023-09-05 | End: 2023-09-08

## 2023-09-05 RX ORDER — CALCITRIOL 0.25 UG/1
0.25 CAPSULE, LIQUID FILLED ORAL DAILY
Qty: 90 CAPSULE | Refills: 0 | Status: SHIPPED | OUTPATIENT
Start: 2023-09-05

## 2023-09-06 DIAGNOSIS — E10.649 TYPE 1 DIABETES MELLITUS WITH HYPOGLYCEMIA AND WITHOUT COMA (HCC): ICD-10-CM

## 2023-09-08 DIAGNOSIS — E10.649 TYPE 1 DIABETES MELLITUS WITH HYPOGLYCEMIA AND WITHOUT COMA (HCC): ICD-10-CM

## 2023-09-08 RX ORDER — ACYCLOVIR 400 MG/1
1 TABLET ORAL
Qty: 3 EACH | Refills: 0 | Status: SHIPPED | OUTPATIENT
Start: 2023-09-08 | End: 2023-09-08

## 2023-09-08 RX ORDER — ACYCLOVIR 400 MG/1
1 TABLET ORAL CONTINUOUS
Qty: 1 EACH | Refills: 0 | Status: SHIPPED | OUTPATIENT
Start: 2023-09-08 | End: 2023-09-13

## 2023-09-08 RX ORDER — ACYCLOVIR 400 MG/1
1 TABLET ORAL CONTINUOUS
Qty: 1 EACH | Refills: 0 | Status: SHIPPED | OUTPATIENT
Start: 2023-09-08 | End: 2023-09-08

## 2023-09-08 RX ORDER — ACYCLOVIR 400 MG/1
1 TABLET ORAL
Qty: 3 EACH | Refills: 0 | Status: SHIPPED | OUTPATIENT
Start: 2023-09-08 | End: 2023-09-13

## 2023-09-11 DIAGNOSIS — Z12.11 SCREENING FOR COLON CANCER: Primary | ICD-10-CM

## 2023-09-11 NOTE — TELEPHONE ENCOUNTER
Scheduled date of EGD/colonoscopy (as of today):09/21/2023  Physician performing EGD/colonoscopy: Janeth Correa  Location of EGD/colonoscopy: [facility]  Desired bowel prep reviewed with patient:Rina /Lindseycolax  Instructions reviewed with patient by: Dylan Daily and sent in the mail  Clearances:   Jorje Rahman MD  Hold is approved , called patient and LM on VM and sent message in chart    Our mutual patient is scheduled for procedure: COLONOSCOPY AND EGD    On: 09/21/2023     With: Dr. Melanie Ruiz is taking the following blood thinner: ELIQUIS    Can this be stopped  2 days prior to the procedure    Physician Approving clearance:     Please review and approve ASAP

## 2023-09-12 DIAGNOSIS — E10.649 TYPE 1 DIABETES MELLITUS WITH HYPOGLYCEMIA AND WITHOUT COMA (HCC): ICD-10-CM

## 2023-09-13 RX ORDER — ACYCLOVIR 400 MG/1
1 TABLET ORAL
Qty: 3 EACH | Refills: 0 | Status: SHIPPED | OUTPATIENT
Start: 2023-09-13

## 2023-09-13 RX ORDER — ACYCLOVIR 400 MG/1
1 TABLET ORAL CONTINUOUS
Qty: 1 EACH | Refills: 0 | Status: SHIPPED | OUTPATIENT
Start: 2023-09-13

## 2023-09-20 ENCOUNTER — TELEPHONE (OUTPATIENT)
Age: 68
End: 2023-09-20

## 2023-09-20 NOTE — TELEPHONE ENCOUNTER
Scheduled date of EGD/colonoscopy (as of today):10/5/2023  Physician performing EGD/colonoscopy:   Location of EGD/colonoscopy: [facility]  Clearances: N/A

## 2023-09-27 DIAGNOSIS — E10.9 INSULIN DEPENDENT TYPE 1 DIABETES MELLITUS (HCC): ICD-10-CM

## 2023-09-28 DIAGNOSIS — E10.9 INSULIN DEPENDENT TYPE 1 DIABETES MELLITUS (HCC): ICD-10-CM

## 2023-09-28 RX ORDER — PEN NEEDLE, DIABETIC 32GX 5/32"
NEEDLE, DISPOSABLE MISCELLANEOUS
Qty: 100 EACH | Refills: 2 | Status: SHIPPED | OUTPATIENT
Start: 2023-09-28

## 2023-09-28 RX ORDER — INSULIN GLARGINE 300 U/ML
20 INJECTION, SOLUTION SUBCUTANEOUS
Qty: 6 ML | Refills: 1 | Status: SHIPPED | OUTPATIENT
Start: 2023-09-28

## 2023-10-05 ENCOUNTER — ANESTHESIA (OUTPATIENT)
Dept: GASTROENTEROLOGY | Facility: HOSPITAL | Age: 68
End: 2023-10-05

## 2023-10-05 ENCOUNTER — ANESTHESIA EVENT (OUTPATIENT)
Dept: GASTROENTEROLOGY | Facility: HOSPITAL | Age: 68
End: 2023-10-05

## 2023-10-05 ENCOUNTER — HOSPITAL ENCOUNTER (OUTPATIENT)
Dept: GASTROENTEROLOGY | Facility: HOSPITAL | Age: 68
Setting detail: OUTPATIENT SURGERY
End: 2023-10-05
Payer: COMMERCIAL

## 2023-10-05 VITALS
SYSTOLIC BLOOD PRESSURE: 105 MMHG | HEART RATE: 69 BPM | RESPIRATION RATE: 18 BRPM | BODY MASS INDEX: 29.59 KG/M2 | OXYGEN SATURATION: 99 % | HEIGHT: 64 IN | DIASTOLIC BLOOD PRESSURE: 63 MMHG | TEMPERATURE: 97.2 F | WEIGHT: 173.3 LBS

## 2023-10-05 DIAGNOSIS — E46 PROTEIN-CALORIE MALNUTRITION, UNSPECIFIED SEVERITY (HCC): ICD-10-CM

## 2023-10-05 DIAGNOSIS — Z12.11 SCREEN FOR COLON CANCER: ICD-10-CM

## 2023-10-05 DIAGNOSIS — R63.4 UNINTENTIONAL WEIGHT LOSS: ICD-10-CM

## 2023-10-05 LAB
GLUCOSE SERPL-MCNC: 88 MG/DL (ref 65–140)
GLUCOSE SERPL-MCNC: 90 MG/DL (ref 65–140)

## 2023-10-05 PROCEDURE — 88313 SPECIAL STAINS GROUP 2: CPT | Performed by: STUDENT IN AN ORGANIZED HEALTH CARE EDUCATION/TRAINING PROGRAM

## 2023-10-05 PROCEDURE — 88342 IMHCHEM/IMCYTCHM 1ST ANTB: CPT | Performed by: STUDENT IN AN ORGANIZED HEALTH CARE EDUCATION/TRAINING PROGRAM

## 2023-10-05 PROCEDURE — 88305 TISSUE EXAM BY PATHOLOGIST: CPT | Performed by: STUDENT IN AN ORGANIZED HEALTH CARE EDUCATION/TRAINING PROGRAM

## 2023-10-05 PROCEDURE — 82948 REAGENT STRIP/BLOOD GLUCOSE: CPT

## 2023-10-05 PROCEDURE — 88341 IMHCHEM/IMCYTCHM EA ADD ANTB: CPT | Performed by: STUDENT IN AN ORGANIZED HEALTH CARE EDUCATION/TRAINING PROGRAM

## 2023-10-05 RX ORDER — PROPOFOL 10 MG/ML
INJECTION, EMULSION INTRAVENOUS AS NEEDED
Status: DISCONTINUED | OUTPATIENT
Start: 2023-10-05 | End: 2023-10-05

## 2023-10-05 RX ORDER — LIDOCAINE HYDROCHLORIDE 20 MG/ML
INJECTION, SOLUTION EPIDURAL; INFILTRATION; INTRACAUDAL; PERINEURAL AS NEEDED
Status: DISCONTINUED | OUTPATIENT
Start: 2023-10-05 | End: 2023-10-05

## 2023-10-05 RX ORDER — SODIUM CHLORIDE, SODIUM LACTATE, POTASSIUM CHLORIDE, CALCIUM CHLORIDE 600; 310; 30; 20 MG/100ML; MG/100ML; MG/100ML; MG/100ML
INJECTION, SOLUTION INTRAVENOUS CONTINUOUS PRN
Status: DISCONTINUED | OUTPATIENT
Start: 2023-10-05 | End: 2023-10-05

## 2023-10-05 RX ADMIN — LIDOCAINE HYDROCHLORIDE 100 MG: 20 INJECTION, SOLUTION EPIDURAL; INFILTRATION; INTRACAUDAL; PERINEURAL at 08:59

## 2023-10-05 RX ADMIN — SODIUM CHLORIDE, SODIUM LACTATE, POTASSIUM CHLORIDE, AND CALCIUM CHLORIDE: .6; .31; .03; .02 INJECTION, SOLUTION INTRAVENOUS at 08:55

## 2023-10-05 RX ADMIN — PROPOFOL 50 MG: 10 INJECTION, EMULSION INTRAVENOUS at 09:02

## 2023-10-05 RX ADMIN — PROPOFOL 100 MG: 10 INJECTION, EMULSION INTRAVENOUS at 08:59

## 2023-10-05 NOTE — ANESTHESIA POSTPROCEDURE EVALUATION
Post-Op Assessment Note    CV Status:  Stable    Pain management: adequate     Mental Status:  Sleepy   Hydration Status:  Euvolemic   PONV Controlled:  Controlled   Airway Patency:  Patent      Post Op Vitals Reviewed: Yes      Staff: CRNA         There were no known notable events for this encounter.     /51 (10/05/23 0918)    Temp 97.7 °F (36.5 °C) (10/05/23 0918)    Pulse 59 (10/05/23 0918)   Resp 18 (10/05/23 0918)    SpO2 99 % (10/05/23 0918)

## 2023-10-05 NOTE — H&P
History and Physical - [facility]  Milo Rock 79 y.o. male MRN: [id number]                  HPI: Milo Rock is a 79y.o. year old male who presents for EGD/colon      REVIEW OF SYSTEMS: Per the HPI, and otherwise unremarkable. Historical Information   Past Medical History:   Diagnosis Date   • A-fib ([facility])    • Diabetes mellitus ([facility])    • Obesity, morbid ([facility]) 11/14/2022   • Stroke [facility])      History reviewed. No pertinent surgical history.   Social History   Social History     Substance and Sexual Activity   Alcohol Use Not Currently   • Alcohol/week: 5.0 standard drinks of alcohol   • Types: 5 Cans of beer per week    Comment: Quit in august 2022     Social History     Substance and Sexual Activity   Drug Use Never     Social History     Tobacco Use   Smoking Status Former   • Packs/day: 0.25   • Years: 30.00   • Total pack years: 7.50   • Types: Cigarettes   Smokeless Tobacco Never     Family History   Problem Relation Age of Onset   • Heart disease Mother    • Cancer Father    • Multiple sclerosis Sister        Meds/Allergies       Current Outpatient Medications:   •  aspirin 81 mg chewable tablet  •  atorvastatin (LIPITOR) 10 mg tablet  •  calcitriol (ROCALTROL) 0.25 mcg capsule  •  cyanocobalamin (VITAMIN B-12) 100 mcg tablet  •  Eliquis 5 MG  •  ferrous sulfate 324 (65 Fe) mg  •  fludrocortisone (FLORINEF) 0.1 mg tablet  •  hydrocortisone (CORTEF) 5 mg tablet  •  hydrocortisone (CORTEF) 5 mg tablet  •  insulin glargine (Toujeo SoloStar) 300 units/mL CONCENTRATED U-300 injection pen (1-unit dial)  •  insulin lispro (HumaLOG) 100 units/mL injection pen  •  midodrine (PROAMATINE) 10 MG tablet  •  potassium chloride (Klor-Con) 10 mEq tablet  •  risperiDONE (RisperDAL) 1 mg tablet  •  sertraline (ZOLOFT) 100 mg tablet  •  sodium chloride 1 g tablet  •  Continuous Blood Gluc  (Dexcom G7 ) STEPHEN  •  Continuous Blood Gluc Sensor (Dexcom G7 Sensor)  •  ergocalciferol (VITAMIN D2) 50,000 units  •  Insulin Pen Needle (BD Pen Needle Blessing 2nd Gen) 32G X 4 MM MISC  •  Insulin Pen Needle (BD Pen Needle Blessing 2nd Gen) 32G X 4 MM MISC  •  polyethylene glycol (GOLYTELY) 4000 mL solution  •  sodium chloride, concentrated, 2.5 MEQ/ML    Allergies   Allergen Reactions   • Imipramine Other (See Comments)   • Lisinopril Other (See Comments)   • Paroxetine Other (See Comments)   • Penicillins Hives   • Rosiglitazone Other (See Comments)   • Troglitazone Other (See Comments)       Objective     /67   Pulse 67   Temp (!) 96.7 °F (35.9 °C) (Temporal)   Resp 16   Ht 5' 4" (1.626 m)   Wt 78.6 kg (173 lb 4.8 oz)   SpO2 100%   BMI 29.75 kg/m²       PHYSICAL EXAM    Gen: NAD  Head: NCAT  CV: RRR  CHEST: Clear  ABD: soft, NT/ND  EXT: no edema      ASSESSMENT/PLAN:  This is a 79y.o. year old male here for EGD/colon, and he is stable and optimized for his procedure.

## 2023-10-06 NOTE — ANESTHESIA PREPROCEDURE EVALUATION
Procedure:  COLONOSCOPY  EGD    Relevant Problems   ANESTHESIA (within normal limits)      CARDIO   (+) Paroxysmal atrial fibrillation    (+) Type 2 MI (myocardial infarction) ([facility])      ENDO   (+) Type 1 diabetes mellitus with hyperglycemia ([facility])      GI/HEPATIC (within normal limits)      /RENAL   (+) VISHAL (acute kidney injury) ([facility])      HEMATOLOGY   (+) Anemia of chronic disease   (+) Iron deficiency anemia, unspecified      MUSCULOSKELETAL (within normal limits)      NEURO/PSYCH   (+) Cerebrovascular accident (CVA) ([facility])      PULMONARY (within normal limits)        Physical Exam    Airway    Mallampati score: II  TM Distance: >3 FB  Neck ROM: full     Dental   No notable dental hx     Cardiovascular  Rhythm: regular, Rate: normal, Cardiovascular exam normal    Pulmonary  Pulmonary exam normal Breath sounds clear to auscultation,     Other Findings        Anesthesia Plan  ASA Score- 3     Anesthesia Type- IV sedation with anesthesia with ASA Monitors. Additional Monitors:   Airway Plan:           Plan Factors-Exercise tolerance (METS): >4 METS. Chart reviewed. EKG reviewed. Imaging results reviewed. Existing labs reviewed. Patient summary reviewed. Induction- intravenous. Postoperative Plan-     Informed Consent- Anesthetic plan and risks discussed with patient. I personally reviewed this patient with the CRNA. Discussed and agreed on the Anesthesia Plan with the CRNA. Derek Speaker

## 2023-10-08 DIAGNOSIS — E27.40 ADRENAL INSUFFICIENCY (HCC): ICD-10-CM

## 2023-10-09 PROCEDURE — 88341 IMHCHEM/IMCYTCHM EA ADD ANTB: CPT | Performed by: STUDENT IN AN ORGANIZED HEALTH CARE EDUCATION/TRAINING PROGRAM

## 2023-10-09 PROCEDURE — 88342 IMHCHEM/IMCYTCHM 1ST ANTB: CPT | Performed by: STUDENT IN AN ORGANIZED HEALTH CARE EDUCATION/TRAINING PROGRAM

## 2023-10-09 PROCEDURE — 88313 SPECIAL STAINS GROUP 2: CPT | Performed by: STUDENT IN AN ORGANIZED HEALTH CARE EDUCATION/TRAINING PROGRAM

## 2023-10-09 PROCEDURE — 88305 TISSUE EXAM BY PATHOLOGIST: CPT | Performed by: STUDENT IN AN ORGANIZED HEALTH CARE EDUCATION/TRAINING PROGRAM

## 2023-10-09 RX ORDER — HYDROCORTISONE 5 MG/1
TABLET ORAL
Qty: 180 TABLET | Refills: 0 | Status: SHIPPED | OUTPATIENT
Start: 2023-10-09

## 2023-10-11 DIAGNOSIS — E27.40 ADRENAL INSUFFICIENCY (HCC): ICD-10-CM

## 2023-10-11 RX ORDER — FLUDROCORTISONE ACETATE 0.1 MG/1
0.2 TABLET ORAL DAILY
Qty: 60 TABLET | Refills: 0 | Status: SHIPPED | OUTPATIENT
Start: 2023-10-11

## 2023-10-12 DIAGNOSIS — K25.9 GASTRIC ULCER WITHOUT HEMORRHAGE OR PERFORATION, UNSPECIFIED CHRONICITY: Primary | ICD-10-CM

## 2023-10-12 RX ORDER — FLUCONAZOLE 200 MG/1
200 TABLET ORAL DAILY
Qty: 14 TABLET | Refills: 0 | Status: SHIPPED | OUTPATIENT
Start: 2023-10-12 | End: 2023-10-26

## 2023-10-17 NOTE — PLAN OF CARE
Date of Service: 10/16/2023    CHIEF COMPLAINT:    Follow up on chronic medical problems.    HISTORY OF PRESENT ILLNESS:    The patient is a 67-year-old female who presents today for a followup, as well as a Welcome to Medicare wellness visit.    The patient's allergies, medications and problem list as well as past medical, surgical, and social histories were reviewed and updated in Epic.    The patient recently had a peripheral intervention by Dr. Lackey.  She is being followed closely.  She does have a known history of resistant hypertension.  She has not seen Dr. Power since March.  She tells me that as far as her medication is concerned she has been taking them regularly.  She did have lipids done back in May and the LDL was 190.  She does admit at that time that she was not taking her Zetia and Atorvastatin, however, she has been taking them since then.  The patient also has not seen her pulmonologist for her emphysema for more than a year.  Denies any worsening symptoms.  She continues to smoke about 5 cigarettes per day.  She is planning to cut down, however, is not ready to quit.  She is asking to be to refer to an optometrist for decreased vision as apparently somebody stole her glasses.  She is also asking to do home health care referral for personal care worker as she has been having difficulty with activities of daily living.  As far as her weight is concerned it appears to be about the same.  She does admit to dietary indiscretions.    PHYSICAL EXAMINATION:    GENERAL:  The patient is a pleasant 67-year-old female, appears to be in no acute distress.  VITAL SIGNS:  Reviewed in Epic.  Blood pressure on recheck was still elevated at 158/80.  LUNGS:  Clear to auscultation bilaterally.  HEART:  Regular rate and rhythm, normal S1, S2.  ABDOMEN:  Soft, nontender, nondistended.  Normoactive bowel sounds.  EXTREMITIES:  There is no leg edema.  PSYCHIATRIC:  Awake, alert, oriented x3, appropriate mood and  Problem: Potential for Falls  Goal: Patient will remain free of falls  Description: INTERVENTIONS:  - Educate patient/family on patient safety including physical limitations  - Instruct patient to call for assistance with activity   - Consult OT/PT to assist with strengthening/mobility   - Keep Call bell within reach  - Keep bed low and locked with side rails adjusted as appropriate  - Keep care items and personal belongings within reach  - Initiate and maintain comfort rounds  - Make Fall Risk Sign visible to staff  - Offer Toileting every 2 Hours, in advance of need  - Initiate/Maintain alarm  - Obtain necessary fall risk management equipment  - Apply yellow socks and bracelet for high fall risk patients  - Consider moving patient to room near nurses station  Outcome: Progressing     Problem: MOBILITY - ADULT  Goal: Maintain or return to baseline ADL function  Description: INTERVENTIONS:  -  Assess patient's ability to carry out ADLs; assess patient's baseline for ADL function and identify physical deficits which impact ability to perform ADLs (bathing, care of mouth/teeth, toileting, grooming, dressing, etc )  - Assess/evaluate cause of self-care deficits   - Assess range of motion  - Assess patient's mobility; develop plan if impaired  - Assess patient's need for assistive devices and provide as appropriate  - Encourage maximum independence but intervene and supervise when necessary  - Involve family in performance of ADLs  - Assess for home care needs following discharge   - Consider OT consult to assist with ADL evaluation and planning for discharge  - Provide patient education as appropriate  Outcome: Progressing  Goal: Maintains/Returns to pre admission functional level  Description: INTERVENTIONS:  - Perform BMAT or MOVE assessment daily    - Set and communicate daily mobility goal to care team and patient/family/caregiver     - Collaborate with rehabilitation services on mobility goals if consulted  - Perform Range of Motion 6 times a day  - Reposition patient every 2 hours    - Dangle patient 3 times a day  - Stand patient 3 times a day  - Ambulate patient 3 times a day  - Out of bed to chair 3 times a day   - Out of bed for meals 3 times a day  - Out of bed for toileting  - Record patient progress and toleration of activity level   Outcome: Progressing     Problem: PAIN - ADULT  Goal: Verbalizes/displays adequate comfort level or baseline comfort level  Description: Interventions:  - Encourage patient to monitor pain and request assistance  - Assess pain using appropriate pain scale  - Administer analgesics based on type and severity of pain and evaluate response  - Implement non-pharmacological measures as appropriate and evaluate response  - Consider cultural and social influences on pain and pain management  - Notify physician/advanced practitioner if interventions unsuccessful or patient reports new pain  Outcome: Progressing     Problem: INFECTION - ADULT  Goal: Absence or prevention of progression during hospitalization  Description: INTERVENTIONS:  - Assess and monitor for signs and symptoms of infection  - Monitor lab/diagnostic results  - Monitor all insertion sites, i e  indwelling lines, tubes, and drains  - Monitor endotracheal if appropriate and nasal secretions for changes in amount and color  - [place] appropriate cooling/warming therapies per order  - Administer medications as ordered  - Instruct and encourage patient and family to use good hand hygiene technique  - Identify and instruct in appropriate isolation precautions for identified infection/condition  Outcome: Progressing  Goal: Absence of fever/infection during neutropenic period  Description: INTERVENTIONS:  - Monitor WBC    Outcome: Progressing     Problem: SAFETY ADULT  Goal: Patient will remain free of falls  Description: INTERVENTIONS:  - Educate patient/family on patient safety including physical limitations  - Instruct patient affect.    LABORATORY DATA:    Reviewed in Epic and discussed with the patient.    ASSESSMENT AND PLAN:    1.  Resistant hypertension.  She is on multiple medications.  I referred the patient back to Dr. Power for further medication adjustments.  She is agreeable to proceed.  2.  Tobacco use disorder.  She was extensively counseled on cessation, not ready to quit.  3.  Centrilobular emphysema appears to be stable.  She needs to reestablish with her pulmonologist.  4.  Prediabetes.  Hemoglobin A1c is actually better at 5.8 today.  She was counseled on lifestyle and dietary modifications.  5.  Decreased vision in both eyes, optometry referral was generated.  6.  Concerns about requiring assistance with activities of daily living.  I referred the patient to a physiatrist for further evaluation.  7.  Class 2 severe obesity due to excess calories with serious comorbidity and body mass index of 35-35.9 in adult.  The patient does have metabolic syndrome.  She might benefit from taking medication for weight loss.  However, she is already on multiple medications.  Will have to address this with her next visit.  8.  Hyperlipidemia, unspecified hyperlipidemia type.  The patient will have her direct LDL rechecked today.  9.  Health maintenance was reviewed and updated in Epic.  She was referred for open access colonoscopy to screen for colon cancer as well as high dose influenza vaccination which was given to her today.  I also did a Welcome to Medicare wellness visit.  The patient does need to get a COVID-19 booster, shingles vaccination as well as RSV, which is especially important in view of her COPD.    I have discussed the patient's medical condition, proposed management plan, risks, benefits and alternatives with the patient in detail.  She understands and is agreeable.  The patient will follow up with me on 11/16 at 11:50 a.m.     I spent 43 minutes managing this patient today.      Dictated By: Robert Gil  MD  Signing Provider: MD LATOSHA Sifuentes/tristian ([id number])   DD: 10/16/2023 4:22:17 PM TD: 10/17/2023 8:43:11 AM       to call for assistance with activity   - Consult OT/PT to assist with strengthening/mobility   - Keep Call bell within reach  - Keep bed low and locked with side rails adjusted as appropriate  - Keep care items and personal belongings within reach  - Initiate and maintain comfort rounds  - Make Fall Risk Sign visible to staff  - Offer Toileting every 2 Hours, in advance of need  - Initiate/Maintain alarm  - Obtain necessary fall risk management equipment  - Apply yellow socks and bracelet for high fall risk patients  - Consider moving patient to room near nurses station  Outcome: Progressing  Goal: Maintain or return to baseline ADL function  Description: INTERVENTIONS:  -  Assess patient's ability to carry out ADLs; assess patient's baseline for ADL function and identify physical deficits which impact ability to perform ADLs (bathing, care of mouth/teeth, toileting, grooming, dressing, etc )  - Assess/evaluate cause of self-care deficits   - Assess range of motion  - Assess patient's mobility; develop plan if impaired  - Assess patient's need for assistive devices and provide as appropriate  - Encourage maximum independence but intervene and supervise when necessary  - Involve family in performance of ADLs  - Assess for home care needs following discharge   - Consider OT consult to assist with ADL evaluation and planning for discharge  - Provide patient education as appropriate  Outcome: Progressing  Goal: Maintains/Returns to pre admission functional level  Description: INTERVENTIONS:  - Perform BMAT or MOVE assessment daily    - Set and communicate daily mobility goal to care team and patient/family/caregiver  - Collaborate with rehabilitation services on mobility goals if consulted  - Perform Range of Motion  times a day  - Reposition patient every  hours    - Dangle patient  times a day  - Stand patient  times a day  - Ambulate patient  times a day  - Out of bed to chair  times a day   - Out of bed for meals times a day  - Out of bed for toileting  - Record patient progress and toleration of activity level   Outcome: Progressing     Problem: DISCHARGE PLANNING  Goal: Discharge to home or other facility with appropriate resources  Description: INTERVENTIONS:  - Identify barriers to discharge w/patient and caregiver  - Arrange for needed discharge resources and transportation as appropriate  - Identify discharge learning needs (meds, wound care, etc )  - Arrange for interpretive services to assist at discharge as needed  - Refer to Case Management Department for coordinating discharge planning if the patient needs post-hospital services based on physician/advanced practitioner order or complex needs related to functional status, cognitive ability, or social support system  Outcome: Progressing     Problem: Knowledge Deficit  Goal: Patient/family/caregiver demonstrates understanding of disease process, treatment plan, medications, and discharge instructions  Description: Complete learning assessment and assess knowledge base  Interventions:  - Provide teaching at level of understanding  - Provide teaching via preferred learning methods  Outcome: Progressing     Problem: Nutrition/Hydration-ADULT  Goal: Nutrient/Hydration intake appropriate for improving, restoring or maintaining nutritional needs  Description: Monitor and assess patient's nutrition/hydration status for malnutrition  Collaborate with interdisciplinary team and initiate plan and interventions as ordered  Monitor patient's weight and dietary intake as ordered or per policy  Utilize nutrition screening tool and intervene as necessary  Determine patient's food preferences and provide high-protein, high-caloric foods as appropriate       INTERVENTIONS:  - Monitor oral intake, urinary output, labs, and treatment plans  - Assess nutrition and hydration status and recommend course of action  - Evaluate amount of meals eaten  - Assist patient with eating if necessary   - Allow adequate time for meals  - Recommend/ encourage appropriate diets, oral nutritional supplements, and vitamin/mineral supplements  - Order, calculate, and assess calorie counts as needed  - Recommend, monitor, and adjust tube feedings and TPN/PPN based on assessed needs  - Assess need for intravenous fluids  - Provide specific nutrition/hydration education as appropriate  - Include patient/family/caregiver in decisions related to nutrition  Outcome: Progressing

## 2023-10-19 ENCOUNTER — TELEPHONE (OUTPATIENT)
Dept: NEPHROLOGY | Facility: CLINIC | Age: 68
End: 2023-10-19

## 2023-10-19 NOTE — TELEPHONE ENCOUNTER
Called pt's daughter to schedule follow up appt with Dr Mario Alberto Patel.  Pt was scheduled on 2/27/24 in the [address] [place] [address] per daughters request.

## 2023-10-23 ENCOUNTER — TELEPHONE (OUTPATIENT)
Dept: ENDOCRINOLOGY | Facility: CLINIC | Age: 68
End: 2023-10-23

## 2023-10-23 DIAGNOSIS — R55 SYNCOPE: ICD-10-CM

## 2023-10-23 DIAGNOSIS — E10.649 TYPE 1 DIABETES MELLITUS WITH HYPOGLYCEMIA AND WITHOUT COMA (HCC): ICD-10-CM

## 2023-10-23 DIAGNOSIS — E10.9 INSULIN DEPENDENT TYPE 1 DIABETES MELLITUS (HCC): ICD-10-CM

## 2023-10-23 RX ORDER — PEN NEEDLE, DIABETIC 32GX 5/32"
NEEDLE, DISPOSABLE MISCELLANEOUS
Qty: 100 EACH | Refills: 0 | Status: SHIPPED | OUTPATIENT
Start: 2023-10-23

## 2023-10-23 RX ORDER — ACYCLOVIR 400 MG/1
1 TABLET ORAL
Qty: 3 EACH | Refills: 0 | Status: SHIPPED | OUTPATIENT
Start: 2023-10-23

## 2023-10-23 RX ORDER — ACYCLOVIR 400 MG/1
1 TABLET ORAL CONTINUOUS
Qty: 1 EACH | Refills: 0 | Status: SHIPPED | OUTPATIENT
Start: 2023-10-23

## 2023-10-23 RX ORDER — MIDODRINE HYDROCHLORIDE 10 MG/1
10 TABLET ORAL
Qty: 270 TABLET | Refills: 3 | Status: SHIPPED | OUTPATIENT
Start: 2023-10-23 | End: 2023-10-27 | Stop reason: SDUPTHER

## 2023-10-23 NOTE — TELEPHONE ENCOUNTER
Requested medication(s) are due for refill today: Yes  Patient has already received a courtesy refill: No  Other reason request has been forwarded to provider: MASOUD LANIER

## 2023-10-26 DIAGNOSIS — E10.9 INSULIN DEPENDENT TYPE 1 DIABETES MELLITUS (HCC): ICD-10-CM

## 2023-10-26 RX ORDER — PEN NEEDLE, DIABETIC 32GX 5/32"
NEEDLE, DISPOSABLE MISCELLANEOUS
Qty: 200 EACH | Refills: 3 | Status: SHIPPED | OUTPATIENT
Start: 2023-10-26

## 2023-10-26 NOTE — TELEPHONE ENCOUNTER
Pt's daughter left v/m re: the same message about needles she sent via [employer]. Needs script sent. Requesting a call back today.

## 2023-10-26 NOTE — TELEPHONE ENCOUNTER
Daughter called, pharmacy needs to know how many times patient is using pen needles a day. He is using 4 times daily, if you can resend script stating this today. He only has 3 pen needles left. Thank you.

## 2023-10-27 DIAGNOSIS — R55 SYNCOPE: ICD-10-CM

## 2023-10-27 RX ORDER — MIDODRINE HYDROCHLORIDE 10 MG/1
15 TABLET ORAL
Qty: 405 TABLET | Refills: 3 | Status: SHIPPED | OUTPATIENT
Start: 2023-10-27 | End: 2024-10-21

## 2023-10-27 NOTE — TELEPHONE ENCOUNTER
Patient daughter called and stated Midodrine was sent incorrectly and is asking for the new script 15 mg 3 times daily. Confirmed with Dr. Roel Godoy and he approved to resend the new script since this is what patient was on previously.

## 2023-10-29 DIAGNOSIS — I21.A1 TYPE 2 MI (MYOCARDIAL INFARCTION) (HCC): ICD-10-CM

## 2023-10-29 DIAGNOSIS — E10.9 INSULIN DEPENDENT TYPE 1 DIABETES MELLITUS (HCC): ICD-10-CM

## 2023-10-30 RX ORDER — INSULIN LISPRO 100 [IU]/ML
INJECTION, SOLUTION INTRAVENOUS; SUBCUTANEOUS
Qty: 15 ML | Refills: 0 | Status: SHIPPED | OUTPATIENT
Start: 2023-10-30

## 2023-11-02 DIAGNOSIS — E27.40 ADRENAL INSUFFICIENCY (HCC): ICD-10-CM

## 2023-11-02 RX ORDER — HYDROCORTISONE 5 MG/1
TABLET ORAL
Qty: 180 TABLET | Refills: 0 | Status: SHIPPED | OUTPATIENT
Start: 2023-11-02

## 2023-11-10 ENCOUNTER — APPOINTMENT (EMERGENCY)
Dept: RADIOLOGY | Facility: HOSPITAL | Age: 68
DRG: 074 | End: 2023-11-10
Payer: COMMERCIAL

## 2023-11-10 ENCOUNTER — HOSPITAL ENCOUNTER (INPATIENT)
Facility: HOSPITAL | Age: 68
LOS: 4 days | Discharge: HOME HEALTH | DRG: 074 | End: 2023-11-14
Attending: EMERGENCY MEDICINE | Admitting: INTERNAL MEDICINE
Payer: COMMERCIAL

## 2023-11-10 DIAGNOSIS — I95.9 HYPOTENSION: ICD-10-CM

## 2023-11-10 DIAGNOSIS — I95.1 ORTHOSTATIC HYPOTENSION: ICD-10-CM

## 2023-11-10 DIAGNOSIS — E27.40 ADRENAL INSUFFICIENCY (HCC): Primary | ICD-10-CM

## 2023-11-10 DIAGNOSIS — E53.8 VITAMIN B 12 DEFICIENCY: ICD-10-CM
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
## 2023-11-10 PROBLEM — E10.649 TYPE 1 DIABETES MELLITUS WITH HYPOGLYCEMIA (HCC): Status: ACTIVE | Noted: 2023-11-10

## 2023-11-10 PROBLEM — I48.0 PAROXYSMAL ATRIAL FIBRILLATION (HCC): Status: ACTIVE | Noted: 2023-11-10

## 2023-11-10 PROBLEM — Z86.73 HISTORY OF CVA (CEREBROVASCULAR ACCIDENT): Status: ACTIVE | Noted: 2023-11-10

## 2023-11-10 LAB
2HR DELTA HS TROPONIN: -5 NG/L
ALBUMIN SERPL BCP-MCNC: 3.6 G/DL (ref 3.5–5)
ALP SERPL-CCNC: 65 U/L (ref 34–104)
ALT SERPL W P-5'-P-CCNC: 13 U/L (ref 7–52)
ANION GAP SERPL CALCULATED.3IONS-SCNC: 5 MMOL/L
APTT PPP: 25 SECONDS (ref 23–37)
AST SERPL W P-5'-P-CCNC: 10 U/L (ref 13–39)
BASE EXCESS BLDA CALC-SCNC: 5 MMOL/L (ref -2–3)
BASOPHILS # BLD AUTO: 0.01 THOUSANDS/ÂΜL (ref 0–0.1)
BASOPHILS NFR BLD AUTO: 0 % (ref 0–1)
BILIRUB SERPL-MCNC: 0.48 MG/DL (ref 0.2–1)
BUN SERPL-MCNC: 17 MG/DL (ref 5–25)
CA-I BLD-SCNC: 1.13 MMOL/L (ref 1.12–1.32)
CALCIUM SERPL-MCNC: 8.4 MG/DL (ref 8.4–10.2)
CARDIAC TROPONIN I PNL SERPL HS: 27 NG/L
CARDIAC TROPONIN I PNL SERPL HS: 32 NG/L
CHLORIDE SERPL-SCNC: 105 MMOL/L (ref 96–108)
CK SERPL-CCNC: 23 U/L (ref 39–308)
CO2 SERPL-SCNC: 32 MMOL/L (ref 21–32)
CREAT SERPL-MCNC: 1.19 MG/DL (ref 0.6–1.3)
EOSINOPHIL # BLD AUTO: 0.07 THOUSAND/ÂΜL (ref 0–0.61)
EOSINOPHIL NFR BLD AUTO: 1 % (ref 0–6)
ERYTHROCYTE [DISTWIDTH] IN BLOOD BY AUTOMATED COUNT: 15.2 % (ref 11.6–15.1)
GFR SERPL CREATININE-BSD FRML MDRD: 63 ML/MIN/1.73SQ M
GLUCOSE SERPL-MCNC: 118 MG/DL (ref 65–140)
GLUCOSE SERPL-MCNC: 118 MG/DL (ref 65–140)
GLUCOSE SERPL-MCNC: 151 MG/DL (ref 65–140)
GLUCOSE SERPL-MCNC: 182 MG/DL (ref 65–140)
GLUCOSE SERPL-MCNC: 227 MG/DL (ref 65–140)
GLUCOSE SERPL-MCNC: 67 MG/DL (ref 65–140)
HCO3 BLDA-SCNC: 29.8 MMOL/L (ref 24–30)
HCT VFR BLD AUTO: 30.9 % (ref 36.5–49.3)
HCT VFR BLD CALC: 34 % (ref 36.5–49.3)
HGB BLD-MCNC: 10.3 G/DL (ref 12–17)
HGB BLDA-MCNC: 11.6 G/DL (ref 12–17)
IMM GRANULOCYTES # BLD AUTO: 0.02 THOUSAND/UL (ref 0–0.2)
IMM GRANULOCYTES NFR BLD AUTO: 0 % (ref 0–2)
INR PPP: 1.16 (ref 0.84–1.19)
LACTATE SERPL-SCNC: 1.6 MMOL/L (ref 0.5–2)
LYMPHOCYTES # BLD AUTO: 2.16 THOUSANDS/ÂΜL (ref 0.6–4.47)
LYMPHOCYTES NFR BLD AUTO: 33 % (ref 14–44)
MAGNESIUM SERPL-MCNC: 1.6 MG/DL (ref 1.9–2.7)
MCH RBC QN AUTO: 31.7 PG (ref 26.8–34.3)
MCHC RBC AUTO-ENTMCNC: 33.3 G/DL (ref 31.4–37.4)
MCV RBC AUTO: 95 FL (ref 82–98)
MONOCYTES # BLD AUTO: 0.8 THOUSAND/ÂΜL (ref 0.17–1.22)
MONOCYTES NFR BLD AUTO: 12 % (ref 4–12)
NEUTROPHILS # BLD AUTO: 3.49 THOUSANDS/ÂΜL (ref 1.85–7.62)
NEUTS SEG NFR BLD AUTO: 54 % (ref 43–75)
NRBC BLD AUTO-RTO: 0 /100 WBCS
PCO2 BLD: 31 MMOL/L (ref 21–32)
PCO2 BLD: 46.5 MM HG (ref 42–50)
PH BLD: 7.42 [PH] (ref 7.3–7.4)
PLATELET # BLD AUTO: 139 THOUSANDS/UL (ref 149–390)
PMV BLD AUTO: 10.9 FL (ref 8.9–12.7)
PO2 BLD: 26 MM HG (ref 35–45)
POTASSIUM BLD-SCNC: 3 MMOL/L (ref 3.5–5.3)
POTASSIUM SERPL-SCNC: 3.1 MMOL/L (ref 3.5–5.3)
PROT SERPL-MCNC: 5.8 G/DL (ref 6.4–8.4)
PROTHROMBIN TIME: 14.7 SECONDS (ref 11.6–14.5)
RBC # BLD AUTO: 3.25 MILLION/UL (ref 3.88–5.62)
SAO2 % BLD FROM PO2: 47 % (ref 60–85)
SODIUM BLD-SCNC: 143 MMOL/L (ref 136–145)
SODIUM SERPL-SCNC: 142 MMOL/L (ref 135–147)
SPECIMEN SOURCE: ABNORMAL
WBC # BLD AUTO: 6.55 THOUSAND/UL (ref 4.31–10.16)

## 2023-11-10 PROCEDURE — 82948 REAGENT STRIP/BLOOD GLUCOSE: CPT

## 2023-11-10 PROCEDURE — 84484 ASSAY OF TROPONIN QUANT: CPT | Performed by: EMERGENCY MEDICINE

## 2023-11-10 PROCEDURE — 85025 COMPLETE CBC W/AUTO DIFF WBC: CPT | Performed by: EMERGENCY MEDICINE

## 2023-11-10 PROCEDURE — EDAIR PR ED AIR: Performed by: EMERGENCY MEDICINE

## 2023-11-10 PROCEDURE — 85014 HEMATOCRIT: CPT

## 2023-11-10 PROCEDURE — 99205 OFFICE O/P NEW HI 60 MIN: CPT | Performed by: EMERGENCY MEDICINE

## 2023-11-10 PROCEDURE — 83735 ASSAY OF MAGNESIUM: CPT | Performed by: INTERNAL MEDICINE

## 2023-11-10 PROCEDURE — 82803 BLOOD GASES ANY COMBINATION: CPT

## 2023-11-10 PROCEDURE — 85730 THROMBOPLASTIN TIME PARTIAL: CPT | Performed by: EMERGENCY MEDICINE

## 2023-11-10 PROCEDURE — 71260 CT THORAX DX C+: CPT

## 2023-11-10 PROCEDURE — 36415 COLL VENOUS BLD VENIPUNCTURE: CPT | Performed by: EMERGENCY MEDICINE

## 2023-11-10 PROCEDURE — 83605 ASSAY OF LACTIC ACID: CPT | Performed by: INTERNAL MEDICINE

## 2023-11-10 PROCEDURE — 85610 PROTHROMBIN TIME: CPT | Performed by: EMERGENCY MEDICINE

## 2023-11-10 PROCEDURE — 72170 X-RAY EXAM OF PELVIS: CPT

## 2023-11-10 PROCEDURE — 72125 CT NECK SPINE W/O DYE: CPT

## 2023-11-10 PROCEDURE — 99285 EMERGENCY DEPT VISIT HI MDM: CPT

## 2023-11-10 PROCEDURE — 96374 THER/PROPH/DIAG INJ IV PUSH: CPT

## 2023-11-10 PROCEDURE — 82947 ASSAY GLUCOSE BLOOD QUANT: CPT

## 2023-11-10 PROCEDURE — 70450 CT HEAD/BRAIN W/O DYE: CPT

## 2023-11-10 PROCEDURE — 84295 ASSAY OF SERUM SODIUM: CPT

## 2023-11-10 PROCEDURE — 99223 1ST HOSP IP/OBS HIGH 75: CPT | Performed by: INTERNAL MEDICINE

## 2023-11-10 PROCEDURE — 93005 ELECTROCARDIOGRAM TRACING: CPT

## 2023-11-10 PROCEDURE — 82330 ASSAY OF CALCIUM: CPT

## 2023-11-10 PROCEDURE — 82550 ASSAY OF CK (CPK): CPT | Performed by: EMERGENCY MEDICINE

## 2023-11-10 PROCEDURE — 80053 COMPREHEN METABOLIC PANEL: CPT | Performed by: INTERNAL MEDICINE

## 2023-11-10 PROCEDURE — 74177 CT ABD & PELVIS W/CONTRAST: CPT

## 2023-11-10 PROCEDURE — 84132 ASSAY OF SERUM POTASSIUM: CPT

## 2023-11-10 PROCEDURE — 71045 X-RAY EXAM CHEST 1 VIEW: CPT

## 2023-11-10 RX ORDER — FLUDROCORTISONE ACETATE 0.1 MG/1
0.2 TABLET ORAL DAILY
COMMUNITY

## 2023-11-10 RX ORDER — MIDODRINE HYDROCHLORIDE 5 MG/1
15 TABLET ORAL
COMMUNITY

## 2023-11-10 RX ORDER — SODIUM CHLORIDE 1 G/1
1 TABLET ORAL 2 TIMES DAILY
Status: ON HOLD | COMMUNITY
End: 2023-11-14 | Stop reason: SDUPTHER

## 2023-11-10 RX ORDER — MIDODRINE HYDROCHLORIDE 5 MG/1
15 TABLET ORAL
Status: DISCONTINUED | OUTPATIENT
Start: 2023-11-10 | End: 2023-11-14 | Stop reason: HOSPADM

## 2023-11-10 RX ORDER — HYDROCORTISONE 5 MG/1
20 TABLET ORAL DAILY
COMMUNITY

## 2023-11-10 RX ORDER — ASPIRIN 81 MG/1
81 TABLET, CHEWABLE ORAL DAILY
Status: DISCONTINUED | OUTPATIENT
Start: 2023-11-11 | End: 2023-11-14 | Stop reason: HOSPADM

## 2023-11-10 RX ORDER — RISPERIDONE 1 MG/1
1 TABLET ORAL 2 TIMES DAILY
COMMUNITY

## 2023-11-10 RX ORDER — INSULIN LISPRO 100 [IU]/ML
1-6 INJECTION, SOLUTION INTRAVENOUS; SUBCUTANEOUS
Status: DISCONTINUED | OUTPATIENT
Start: 2023-11-10 | End: 2023-11-14 | Stop reason: HOSPADM

## 2023-11-10 RX ORDER — RISPERIDONE 1 MG/1
1 TABLET ORAL 2 TIMES DAILY
Status: DISCONTINUED | OUTPATIENT
Start: 2023-11-10 | End: 2023-11-14 | Stop reason: HOSPADM

## 2023-11-10 RX ORDER — INSULIN GLARGINE 100 [IU]/ML
10 INJECTION, SOLUTION SUBCUTANEOUS
Status: DISCONTINUED | OUTPATIENT
Start: 2023-11-11 | End: 2023-11-12

## 2023-11-10 RX ORDER — CALCITRIOL 0.25 UG/1
0.25 CAPSULE, LIQUID FILLED ORAL DAILY
Status: DISCONTINUED | OUTPATIENT
Start: 2023-11-10 | End: 2023-11-14 | Stop reason: HOSPADM

## 2023-11-10 RX ORDER — FERROUS SULFATE 325(65) MG
325 TABLET ORAL
COMMUNITY

## 2023-11-10 RX ORDER — POTASSIUM CHLORIDE 20 MEQ/1
40 TABLET, EXTENDED RELEASE ORAL ONCE
Status: COMPLETED | OUTPATIENT
Start: 2023-11-10 | End: 2023-11-10

## 2023-11-10 RX ORDER — FLUDROCORTISONE ACETATE 0.1 MG/1
0.2 TABLET ORAL DAILY
Status: DISCONTINUED | OUTPATIENT
Start: 2023-11-10 | End: 2023-11-14 | Stop reason: HOSPADM

## 2023-11-10 RX ORDER — FERROUS SULFATE 325(65) MG
325 TABLET ORAL
Status: DISCONTINUED | OUTPATIENT
Start: 2023-11-11 | End: 2023-11-14 | Stop reason: HOSPADM

## 2023-11-10 RX ORDER — SODIUM CHLORIDE 1 G/1
1 TABLET ORAL 2 TIMES DAILY
Status: DISCONTINUED | OUTPATIENT
Start: 2023-11-10 | End: 2023-11-10

## 2023-11-10 RX ORDER — ASPIRIN 81 MG/1
81 TABLET, CHEWABLE ORAL DAILY
COMMUNITY

## 2023-11-10 RX ORDER — INSULIN LISPRO 100 [IU]/ML
1-5 INJECTION, SOLUTION INTRAVENOUS; SUBCUTANEOUS
Status: DISCONTINUED | OUTPATIENT
Start: 2023-11-10 | End: 2023-11-14 | Stop reason: HOSPADM

## 2023-11-10 RX ORDER — INSULIN LISPRO 100 [IU]/ML
3 INJECTION, SOLUTION INTRAVENOUS; SUBCUTANEOUS
Status: DISCONTINUED | OUTPATIENT
Start: 2023-11-10 | End: 2023-11-12

## 2023-11-10 RX ORDER — INSULIN GLARGINE 100 [IU]/ML
6 INJECTION, SOLUTION SUBCUTANEOUS
Status: DISCONTINUED | OUTPATIENT
Start: 2023-11-10 | End: 2023-11-10

## 2023-11-10 RX ORDER — SODIUM CHLORIDE 1 G/1
1 TABLET ORAL
Status: DISCONTINUED | OUTPATIENT
Start: 2023-11-10 | End: 2023-11-14 | Stop reason: HOSPADM

## 2023-11-10 RX ORDER — INSULIN LISPRO 100 [IU]/ML
6 INJECTION, SOLUTION INTRAVENOUS; SUBCUTANEOUS
COMMUNITY

## 2023-11-10 RX ORDER — INSULIN GLARGINE 300 U/ML
20 INJECTION, SOLUTION SUBCUTANEOUS
COMMUNITY

## 2023-11-10 RX ORDER — MAGNESIUM SULFATE HEPTAHYDRATE 40 MG/ML
2 INJECTION, SOLUTION INTRAVENOUS ONCE
Status: COMPLETED | OUTPATIENT
Start: 2023-11-10 | End: 2023-11-11

## 2023-11-10 RX ADMIN — CALCITRIOL 0.25 MCG: 0.25 CAPSULE, LIQUID FILLED ORAL at 17:11

## 2023-11-10 RX ADMIN — MIDODRINE HYDROCHLORIDE 15 MG: 5 TABLET ORAL at 16:31

## 2023-11-10 RX ADMIN — HYDROCORTISONE SODIUM SUCCINATE 50 MG: 100 INJECTION, POWDER, FOR SOLUTION INTRAMUSCULAR; INTRAVENOUS at 22:31

## 2023-11-10 RX ADMIN — SODIUM CHLORIDE 1 G: 1 TABLET ORAL at 17:11

## 2023-11-10 RX ADMIN — INSULIN LISPRO 1 UNITS: 100 INJECTION, SOLUTION INTRAVENOUS; SUBCUTANEOUS at 17:11

## 2023-11-10 RX ADMIN — POTASSIUM CHLORIDE 40 MEQ: 1500 TABLET, EXTENDED RELEASE ORAL at 21:24

## 2023-11-10 RX ADMIN — INSULIN LISPRO 3 UNITS: 100 INJECTION, SOLUTION INTRAVENOUS; SUBCUTANEOUS at 17:11

## 2023-11-10 RX ADMIN — IOHEXOL 100 ML: 350 INJECTION, SOLUTION INTRAVENOUS at 10:59

## 2023-11-10 RX ADMIN — APIXABAN 5 MG: 5 TABLET, FILM COATED ORAL at 21:14

## 2023-11-10 RX ADMIN — HYDROCORTISONE SODIUM SUCCINATE 100 MG: 100 INJECTION, POWDER, FOR SOLUTION INTRAMUSCULAR; INTRAVENOUS at 16:33

## 2023-11-10 RX ADMIN — FLUDROCORTISONE ACETATE 0.2 MG: 0.1 TABLET ORAL at 17:11

## 2023-11-10 RX ADMIN — INSULIN GLARGINE 6 UNITS: 100 INJECTION, SOLUTION SUBCUTANEOUS at 22:31

## 2023-11-10 RX ADMIN — INSULIN LISPRO 2 UNITS: 100 INJECTION, SOLUTION INTRAVENOUS; SUBCUTANEOUS at 21:21

## 2023-11-10 RX ADMIN — RISPERIDONE 1 MG: 1 TABLET ORAL at 22:42

## 2023-11-10 RX ADMIN — SODIUM CHLORIDE 500 ML: 0.9 INJECTION, SOLUTION INTRAVENOUS at 16:13

## 2023-11-10 RX ADMIN — MAGNESIUM SULFATE HEPTAHYDRATE 2 G: 40 INJECTION, SOLUTION INTRAVENOUS at 22:32

## 2023-11-10 NOTE — PROGRESS NOTES
Pastoral Care Progress Note    11/10/2023  Patient: Felicita Roland : 1955  Admission Date & Time: 11/10/2023 1023  MRN: [id number] CSN: [id number]           responded to a stroke alert.  ask pt if he wanted a visit. Pt responded that no care was needed.  also asked staff for notification if family arrived.  remains available.               11/10/23 1100   Clinical Encounter Type   Visited With Patient

## 2023-11-10 NOTE — H&P
H&P - Trauma   Yashira Zapata 79 y.o. male MRN: [id number]  Unit/Bed#: TR 02 Encounter: [id number]    Trauma Alert: Bob Stephenson to Level A   Model of Arrival: Ambulance    Trauma Team: Attending Nela Laguerre, and DODIE Tamayo  Consultants:     None     Assessment/Plan   Active Problems / Assessment:   - Unwitnessed fall, unknown head strike, likely loss of consciousness, takes Eliquis daily  - EMS reported SBP 70 mmHg en route  - In trauma bay manual /64  - GCS 15, nonfocal       Plan:   - CXR - neg  - FAST - neg  - CT Head - neg  - CT C-spine - neg  - CT CAP - neg      History of Present Illness     Chief Complaint: Fall  Mechanism:Fall     HPI:    Yashira Zapata is a 79 y.o. male PMH CVA and opioid use, who presents with hypotension after a fall. Unwitnessed fall in bathroom, found by girlfriend with altered mental status. Unknown head strike, loss of consciousness, and takes Eliquis daily. EMS reports patient was awake and responsive and had no complaints. He had hypotension en route which improved. Review of Systems   Constitutional:  Positive for activity change. HENT: Negative. Negative for facial swelling. Eyes: Negative. Negative for photophobia. Respiratory: Negative. Negative for shortness of breath. Cardiovascular: Negative. Negative for chest pain. Gastrointestinal: Negative. Negative for abdominal pain and nausea. Musculoskeletal: Negative. Negative for back pain and neck pain. Skin: Negative. Negative for wound. Neurological:  Positive for light-headedness. Negative for dizziness, syncope, weakness, numbness and headaches. Psychiatric/Behavioral: Negative. Negative for confusion. All other systems reviewed and are negative. 12-point, complete review of systems was reviewed and negative except as stated above.      Historical Information     Past Medical History:   Diagnosis Date    Adrenal insufficiency ([address])     Atrial fibrillation ([address])     Diabetes mellitus ([address])      History reviewed. No pertinent surgical history. Unable to obtain history due to  none         There is no immunization history on file for this patient. Last Tetanus: 2020  Family History: Non-contributory     Meds/Allergies   all current active meds have been reviewed  Allergies have not been reviewed; Not on File    Objective   Initial Vitals:   Temperature: 98.4 °F (36.9 °C) (11/10/23 1026)  Pulse: 70 (11/10/23 1026)  Respirations: 18 (11/10/23 1026)  Blood Pressure: 122/64 (11/10/23 1026)    Primary Survey:   Airway:        Status: patent;        Pre-hospital Interventions: none        Hospital Interventions: none  Breathing:        Pre-hospital Interventions: none       Effort: normal       Right breath sounds: normal       Left breath sounds: normal  Circulation:        Rhythm: regular       Rate: regular   Right Pulses Left Pulses    R radial: 2+    R pedal: 2+     L radial: 2+    L pedal: 2+       Disability:        GCS: Eye: 4; Verbal: 5 Motor: 6 Total: 15       Right Pupil: round;  reactive         Left Pupil:  round;  reactive      R Motor Strength L Motor Strength    R : 5/5  R dorsiflex: 5/5  R plantarflex: 5/5 L : 5/5  L dorsiflex: 5/5  L plantarflex: 5/5        Sensory:  No sensory deficit  Exposure:       Completed: Yes      Secondary Survey:  Physical Exam  Vitals reviewed. Constitutional:       General: He is not in acute distress. Appearance: Normal appearance. HENT:      Head: Normocephalic and atraumatic. Right Ear: External ear normal.      Left Ear: External ear normal.      Nose: Nose normal.      Mouth/Throat:      Mouth: Mucous membranes are moist.      Pharynx: Oropharynx is clear. Eyes:      Extraocular Movements: Extraocular movements intact. Conjunctiva/sclera: Conjunctivae normal.      Pupils: Pupils are equal, round, and reactive to light. Cardiovascular:      Rate and Rhythm: Normal rate and regular rhythm.       Pulses: Normal pulses. Heart sounds: Normal heart sounds. Pulmonary:      Effort: Pulmonary effort is normal. No respiratory distress. Breath sounds: Normal breath sounds. Chest:      Chest wall: No tenderness. Abdominal:      General: Abdomen is flat. Bowel sounds are normal. There is no distension. Palpations: Abdomen is soft. Tenderness: There is no abdominal tenderness. There is no guarding. Genitourinary:     Comments: Perineum clear  Musculoskeletal:         General: No swelling, tenderness, deformity or signs of injury. Normal range of motion. Cervical back: No tenderness. Comments: Lumbar tenderness   Skin:     General: Skin is warm and dry. Capillary Refill: Capillary refill takes less than 2 seconds. Findings: No bruising or lesion. Neurological:      General: No focal deficit present. Mental Status: He is alert and oriented to person, place, and time. Mental status is at baseline. Sensory: No sensory deficit. Motor: No weakness. Psychiatric:         Mood and Affect: Mood normal.         Behavior: Behavior normal.         Invasive Devices       Peripheral Intravenous Line  Duration             Peripheral IV 11/10/23 Right Antecubital <1 day                  Lab Results: I have personally reviewed all pertinent laboratory/test results 11/10/23 and in the preceding 24 hours. No results for input(s): "WBC", "HGB", "HCT", "PLT", "BANDSPCT", "SODIUM", "K", "CL", "CO2", "BUN", "CREATININE", "GLUC", "CAIONIZED", "MG", "PHOS", "AST", "ALT", "ALB", "TBILI", "DBILI", "ALKPHOS", "PTT", "INR", "HSTNI0", "HSTNI2", "BNP", "LACTICACID" in the last 72 hours. Imaging Results: I have personally reviewed pertinent images saved in PACS. CT scan findings (and other pertinent positive findings on images) were discussed with radiology.  My interpretation of the images/reports are as follows:  Chest Xray(s): negative for acute findings   FAST exam(s): negative for acute findings   CT Scan(s): See below   Additional Xray(s): See below     Other Studies:   XR Trauma multiple ([facility] trauma bay ONLY)    (Results Pending)   XR chest 1 view    (Results Pending)   XR pelvis ap only 1 or 2 vw    (Results Pending)   TRAUMA - CT head wo contrast    (Results Pending)   TRAUMA - CT spine cervical wo contrast    (Results Pending)   TRAUMA - CT chest abdomen pelvis w contrast    (Results Pending)         Code Status: No Order  Advance Directive and Living Will:      Power of :    POLST:    I have spent 30 minutes with Patient and family today in which greater than 50% of this time was spent in counseling/coordination of care regarding Diagnostic results, Prognosis, Risks and benefits of tx options, Instructions for management, Patient and family education, Importance of tx compliance, Risk factor reductions, Impressions, Counseling / Coordination of care, Documenting in the medical record, Reviewing / ordering tests, medicine, procedures  , Obtaining or reviewing history  , and Communicating with other healthcare professionals .

## 2023-11-10 NOTE — PROGRESS NOTES
Pastoral Care Progress Note    11/10/2023  Patient: Sekou Pearl : 1955  Admission Date & Time: 11/10/2023 1023  MRN: [id number] CSN: [id number]         learn pt's family arrived and met daughter in pt's room. Daughter and pt expressed  no need of care.  explained how to reach dept. if needed.      11/10/23 1500   Clinical Encounter Type   Visited With Patient and family together   Routine Visit Follow-up   Referral From Nurse   Referral To

## 2023-11-10 NOTE — ED PROVIDER NOTES
Emergency Department Airway Evaluation and Management Form    History  Obtained from: EMS  Patient has no allergy information on record. Chief Complaint   Patient presents with    Trauma     Pt fall on eliquis     24-year-old male status post syncopal event with fall on Eliquis patient noted to be hypotensive in field 70 systolic with AMS. EMS unable to obtain IV access in field.-Level a trauma alert prior to arrival.          Past Medical History:   Diagnosis Date    Adrenal insufficiency ([address])     Atrial fibrillation ([address])     Diabetes mellitus ([address])      History reviewed. No pertinent surgical history. No family history on file. Social History     Tobacco Use    Smoking status: Former     Types: Cigarettes    Smokeless tobacco: Never   Substance Use Topics    Alcohol use: Not Currently    Drug use: Never     I have reviewed and agree with the history as documented.     Review of Systems    Physical Exam  /52   Pulse 66   Temp 98.4 °F (36.9 °C) (Tympanic)   Resp 18   SpO2 97%     Physical Exam  HENT:      Mouth/Throat:      Comments: Airway open and patent        ED Medications  Medications - No data to display    Intubation  Procedures    Notes  No acute airway intervention indicated    Final Diagnosis  Final diagnoses:   None       ED Provider  Electronically Signed by     Mireille Valerio MD  11/10/23 1038

## 2023-11-10 NOTE — QUICK NOTE
CT C-spine neg  On clinical exam no tenderness to palpation  FROM  No pain with axial load  Collar removed and C-spine cleared clinically

## 2023-11-11 LAB
ANION GAP SERPL CALCULATED.3IONS-SCNC: 8 MMOL/L
ATRIAL RATE: 73 BPM
BUN SERPL-MCNC: 19 MG/DL (ref 5–25)
CALCIUM SERPL-MCNC: 8.6 MG/DL (ref 8.4–10.2)
CHLORIDE SERPL-SCNC: 102 MMOL/L (ref 96–108)
CO2 SERPL-SCNC: 29 MMOL/L (ref 21–32)
CREAT SERPL-MCNC: 1.04 MG/DL (ref 0.6–1.3)
ERYTHROCYTE [DISTWIDTH] IN BLOOD BY AUTOMATED COUNT: 15.3 % (ref 11.6–15.1)
EST. AVERAGE GLUCOSE BLD GHB EST-MCNC: 206 MG/DL
GFR SERPL CREATININE-BSD FRML MDRD: 74 ML/MIN/1.73SQ M
GLUCOSE SERPL-MCNC: 202 MG/DL (ref 65–140)
GLUCOSE SERPL-MCNC: 218 MG/DL (ref 65–140)
GLUCOSE SERPL-MCNC: 247 MG/DL (ref 65–140)
GLUCOSE SERPL-MCNC: 257 MG/DL (ref 65–140)
GLUCOSE SERPL-MCNC: 292 MG/DL (ref 65–140)
GLUCOSE SERPL-MCNC: 310 MG/DL (ref 65–140)
HBA1C MFR BLD: 8.8 %
HCT VFR BLD AUTO: 38 % (ref 36.5–49.3)
HGB BLD-MCNC: 12.5 G/DL (ref 12–17)
MAGNESIUM SERPL-MCNC: 2 MG/DL (ref 1.9–2.7)
MCH RBC QN AUTO: 31.1 PG (ref 26.8–34.3)
MCHC RBC AUTO-ENTMCNC: 32.9 G/DL (ref 31.4–37.4)
MCV RBC AUTO: 95 FL (ref 82–98)
P AXIS: 80 DEGREES
PLATELET # BLD AUTO: 161 THOUSANDS/UL (ref 149–390)
PMV BLD AUTO: 10.7 FL (ref 8.9–12.7)
POTASSIUM SERPL-SCNC: 3.8 MMOL/L (ref 3.5–5.3)
PR INTERVAL: 160 MS
QRS AXIS: 56 DEGREES
QRSD INTERVAL: 74 MS
QT INTERVAL: 436 MS
QTC INTERVAL: 480 MS
RBC # BLD AUTO: 4.02 MILLION/UL (ref 3.88–5.62)
SODIUM SERPL-SCNC: 139 MMOL/L (ref 135–147)
T WAVE AXIS: -13 DEGREES
VENTRICULAR RATE: 73 BPM
WBC # BLD AUTO: 7.22 THOUSAND/UL (ref 4.31–10.16)

## 2023-11-11 PROCEDURE — 80048 BASIC METABOLIC PNL TOTAL CA: CPT | Performed by: INTERNAL MEDICINE

## 2023-11-11 PROCEDURE — 99223 1ST HOSP IP/OBS HIGH 75: CPT | Performed by: INTERNAL MEDICINE

## 2023-11-11 PROCEDURE — 83735 ASSAY OF MAGNESIUM: CPT | Performed by: INTERNAL MEDICINE

## 2023-11-11 PROCEDURE — 99233 SBSQ HOSP IP/OBS HIGH 50: CPT | Performed by: STUDENT IN AN ORGANIZED HEALTH CARE EDUCATION/TRAINING PROGRAM

## 2023-11-11 PROCEDURE — 82948 REAGENT STRIP/BLOOD GLUCOSE: CPT

## 2023-11-11 PROCEDURE — 85027 COMPLETE CBC AUTOMATED: CPT | Performed by: INTERNAL MEDICINE

## 2023-11-11 PROCEDURE — 83036 HEMOGLOBIN GLYCOSYLATED A1C: CPT | Performed by: INTERNAL MEDICINE

## 2023-11-11 PROCEDURE — 99231 SBSQ HOSP IP/OBS SF/LOW 25: CPT | Performed by: SURGERY

## 2023-11-11 PROCEDURE — 99222 1ST HOSP IP/OBS MODERATE 55: CPT | Performed by: INTERNAL MEDICINE

## 2023-11-11 RX ADMIN — SODIUM CHLORIDE 1 G: 1 TABLET ORAL at 08:26

## 2023-11-11 RX ADMIN — FLUDROCORTISONE ACETATE 0.2 MG: 0.1 TABLET ORAL at 08:27

## 2023-11-11 RX ADMIN — INSULIN GLARGINE 10 UNITS: 100 INJECTION, SOLUTION SUBCUTANEOUS at 21:25

## 2023-11-11 RX ADMIN — INSULIN LISPRO 2 UNITS: 100 INJECTION, SOLUTION INTRAVENOUS; SUBCUTANEOUS at 08:24

## 2023-11-11 RX ADMIN — MIDODRINE HYDROCHLORIDE 15 MG: 5 TABLET ORAL at 12:01

## 2023-11-11 RX ADMIN — HYDROCORTISONE SODIUM SUCCINATE 50 MG: 100 INJECTION, POWDER, FOR SOLUTION INTRAMUSCULAR; INTRAVENOUS at 06:52

## 2023-11-11 RX ADMIN — INSULIN LISPRO 4 UNITS: 100 INJECTION, SOLUTION INTRAVENOUS; SUBCUTANEOUS at 16:09

## 2023-11-11 RX ADMIN — FERROUS SULFATE TAB 325 MG (65 MG ELEMENTAL FE) 325 MG: 325 (65 FE) TAB at 08:26

## 2023-11-11 RX ADMIN — INSULIN LISPRO 5 UNITS: 100 INJECTION, SOLUTION INTRAVENOUS; SUBCUTANEOUS at 11:58

## 2023-11-11 RX ADMIN — INSULIN LISPRO 3 UNITS: 100 INJECTION, SOLUTION INTRAVENOUS; SUBCUTANEOUS at 08:24

## 2023-11-11 RX ADMIN — SODIUM CHLORIDE 1 G: 1 TABLET ORAL at 12:00

## 2023-11-11 RX ADMIN — MIDODRINE HYDROCHLORIDE 15 MG: 5 TABLET ORAL at 06:52

## 2023-11-11 RX ADMIN — INSULIN LISPRO 2 UNITS: 100 INJECTION, SOLUTION INTRAVENOUS; SUBCUTANEOUS at 21:25

## 2023-11-11 RX ADMIN — RISPERIDONE 1 MG: 1 TABLET ORAL at 10:08

## 2023-11-11 RX ADMIN — ASPIRIN 81 MG CHEWABLE TABLET 81 MG: 81 TABLET CHEWABLE at 08:27

## 2023-11-11 RX ADMIN — APIXABAN 5 MG: 5 TABLET, FILM COATED ORAL at 17:36

## 2023-11-11 RX ADMIN — SODIUM CHLORIDE 1 G: 1 TABLET ORAL at 15:36

## 2023-11-11 RX ADMIN — INSULIN LISPRO 3 UNITS: 100 INJECTION, SOLUTION INTRAVENOUS; SUBCUTANEOUS at 11:59

## 2023-11-11 RX ADMIN — VITAM B12 100 MCG: 100 TAB at 08:26

## 2023-11-11 RX ADMIN — APIXABAN 5 MG: 5 TABLET, FILM COATED ORAL at 08:27

## 2023-11-11 RX ADMIN — RISPERIDONE 1 MG: 1 TABLET ORAL at 17:36

## 2023-11-11 RX ADMIN — CALCITRIOL 0.25 MCG: 0.25 CAPSULE, LIQUID FILLED ORAL at 08:26

## 2023-11-11 RX ADMIN — INSULIN LISPRO 3 UNITS: 100 INJECTION, SOLUTION INTRAVENOUS; SUBCUTANEOUS at 16:09

## 2023-11-11 RX ADMIN — HYDROCORTISONE 15 MG: 10 TABLET ORAL at 15:35

## 2023-11-11 NOTE — CONSULTS
Consultation - Cardiology Team 1  Eugena Pallas 79 y.o. male MRN: [id number]  [facility] Encounter: [id number]    Assessment/Plan     Principal Problem:    Hypotension/syncope  Active Problems:    Fall    Adrenal insufficiency (HCC)    Orthostatic hypotension    Type 1 diabetes mellitus with hypoglycemia ([address])    History of CVA (cerebrovascular accident)    Paroxysmal atrial fibrillation (HCC)      Assessment/Plan    Hypotension   Syncope as a result  This is has been a recurrent issue  Pt denies prior syncope  Attributed to adrenal insufficiency and autonomic dysfunction  In the ED patient has been given IV hydrocortisone, NSS bolus  TTE 1/2023- normal LV /RV function. No AS. ECG- NSR inferolateral ST depression- as previous ECG  Hyponatremic- repleted    Syncope- secondary to #1  Fortunately no apparent injuries    Adrenal insufficiency/autonomic dysfunction  Patient takes hydrocortisone 20 mg in the a.m. and 15 mg in the p.m. He was given IV hydrocortisone 100 mg followed by 50 mg every 8 hours  Will f/u with endocrinology recommendations    Orthostatic hypotension  In addition to IV hydrocortisone he is  continued midodrine 15 mg 3 times daily and Florinef 0.2 mg daily. Additionally is on 1 g sodium chloride tablet 3 times daily  Can consider northera if covered under his insurance  He however does have supine hypertension with SBP to 180's  Patient has been instructed to maintain adequate hydration including  fluids such as Gatorade    Type 1 diabetes    History of CVA    PAF- maintaining NSR  On AC- eliquis 5mg BID      History of Present Illness   Physician Requesting Consult: Imani Holly DO  Reason for Consult / Principal Problem: hypotension    HPI: Eugena Pallas is a 79y.o. year old male orthostatic hypotension, adrenal insufficiency, type 1 diabetes, history of CVA, paroxysmal atrial fibrillation who presents with syncope and reported hypotension.   Patient states he was getting up off the toilet and passed out. He has no other recollection of events. He does not recall preceding symptoms. He states he is never passed out before. EMS reports systolic blood pressure of 70. In trauma bay his blood pressure was 122/64. Patient has a history of orthostasis and adrenal insufficiency and apparently has been pretty stable for the past few months on midodrine 15 mg 3 times daily, hydrocortisone 20 mg in the a.m. and 15mg in  p.m. and Florinef 0.2 mg daily. He had transient hypotension in the trauma bay as well. No symptoms at that time. Patient was given 100 mg IV hydrocortisone followed by 50 mg IV every 8 hours. He received 500cc NSS. He has been cleared by trauma with no traumatic injuries. Other than the transient hypotensive episodes he is normotensive to to hypertensive. He is a poor historian . His "friend " was apparently present at the time of the syncopal event. Pt is followed by Dr. Taina Quintero. He was last seen in the office 2/2023 his daughter who is a CNA provided systolic blood pressures into the 60s. At that point he was on midodrine 10 mg 3 times daily and was increased to 15 mg 3 times daily. It was felt that increasing Florinef would cause side effects and not necessarily improve hypotension. He was asked to increase his salt intake with things like Gatorade not just increasing water intake. TTE 1/2023-normal LV function with no significant valvular heart disease    Inpatient consult to Cardiology  Consult performed by: NACHO Mcguire  Consult ordered by: Carola Wadsworth MD          Review of Systems   Reason unable to perform ROS: poor historian. Respiratory:  Negative for shortness of breath. Cardiovascular:  Negative for chest pain. Neurological:  Positive for syncope. Historical Information   Past Medical History:   Diagnosis Date    Adrenal insufficiency ([address])     Atrial fibrillation ([address])     Diabetes mellitus ([address])      History reviewed.  No pertinent surgical history. Social History     Substance and Sexual Activity   Alcohol Use Not Currently     Social History     Substance and Sexual Activity   Drug Use Never     Social History     Tobacco Use   Smoking Status Former    Types: Cigarettes   Smokeless Tobacco Never     Family History: No family history on file.     Meds/Allergies   current meds:   Current Facility-Administered Medications   Medication Dose Route Frequency    apixaban (ELIQUIS) tablet 5 mg  5 mg Oral BID    aspirin chewable tablet 81 mg  81 mg Oral Daily    calcitriol (ROCALTROL) capsule 0.25 mcg  0.25 mcg Oral Daily    cyanocobalamin (VITAMIN B-12) tablet 100 mcg  100 mcg Oral Daily    ferrous sulfate tablet 325 mg  325 mg Oral Daily With Breakfast    fludrocortisone (FLORINEF) tablet 0.2 mg  0.2 mg Oral Daily    hydrocortisone (Solu-CORTEF) injection 50 mg  50 mg Intravenous Q8H [address]    insulin glargine (LANTUS) subcutaneous injection 10 Units 0.1 mL  10 Units Subcutaneous HS    insulin lispro (HumaLOG) 100 units/mL subcutaneous injection 1-5 Units  1-5 Units Subcutaneous HS    insulin lispro (HumaLOG) 100 units/mL subcutaneous injection 1-6 Units  1-6 Units Subcutaneous TID With Meals    insulin lispro (HumaLOG) 100 units/mL subcutaneous injection 3 Units  3 Units Subcutaneous TID With Meals    midodrine (PROAMATINE) tablet 15 mg  15 mg Oral TID AC    risperiDONE (RisperDAL) tablet 1 mg  1 mg Oral BID    sodium chloride tablet 1 g  1 g Oral TID With Meals    and PTA meds:    Medications Prior to Admission   Medication    apixaban (Eliquis) 5 mg    aspirin 81 mg chewable tablet    ferrous sulfate 325 (65 Fe) mg tablet    fludrocortisone (FLORINEF) 0.1 mg tablet    hydrocortisone (CORTEF) 5 mg tablet    insulin glargine (Toujeo Max SoloStar) 300 units/mL CONCENTRATED U-300 injection pen (2-unit dial)    insulin lispro (HumaLOG) 100 units/mL injection    midodrine (PROAMATINE) 5 mg tablet    risperiDONE (RisperDAL) 1 mg tablet    sodium chloride 1 g tablet     No Known Allergies    Objective   Vitals: Blood pressure 143/80, pulse 78, temperature 99.8 °F (37.7 °C), temperature source Oral, resp. rate 20, height 5' 4" (1.626 m), weight 68 kg (150 lb), SpO2 98 %. Orthostatic Blood Pressures      Flowsheet Row Most Recent Value   Blood Pressure 143/80 filed at 11/11/2023 0815   Patient Position - Orthostatic VS Sitting filed at 11/11/2023 0815              Intake/Output Summary (Last 24 hours) at 11/11/2023 0904  Last data filed at 11/11/2023 0715  Gross per 24 hour   Intake 550 ml   Output 300 ml   Net 250 ml       Invasive Devices       Peripheral Intravenous Line  Duration             Peripheral IV 11/10/23 Dorsal (posterior); Left Hand <1 day    Peripheral IV 11/10/23 Dorsal (posterior); Right Hand <1 day                    Physical Exam: /80 (BP Location: Right arm)   Pulse 78   Temp 99.8 °F (37.7 °C) (Oral)   Resp 20   Ht 5' 4" (1.626 m)   Wt 68 kg (150 lb)   SpO2 98%   BMI 25.75 kg/m²   General Appearance:    Alert, cooperative, no distress, appears stated age   Head:    Normocephalic, no scleral icterus   Eyes:    PERRL   Nose:   Nares normal, septum midline, mucosa normal, no drainage    Throat:   Lips, mucosa, and tongue normal   Neck:   Supple, symmetrical, trachea midline     no JVD       Lungs:     Clear to auscultation bilaterally, respirations unlabored   Chest Wall:    No tenderness or deformity    Heart:    Regular rate and rhythm, S1 and S2 normal, no murmur, rub   or gallop   Abdomen:     Soft, non-tender, bowel sounds active all four quadrants,     no masses, no organomegaly   Extremities:   Extremities normal, atraumatic, no cyanosis or edema   Pulses:   2+ and symmetric all extremities   Skin:   Skin color, texture, turgor normal, no rashes or lesions   Neurologic:   Alert and oriented to person place and time.         Lab Results:   Recent Results (from the past 72 hour(s))   Fingerstick Glucose (POCT)    Collection Time: 11/10/23 10:29 AM   Result Value Ref Range    POC Glucose 118 65 - 140 mg/dl   CK    Collection Time: 11/10/23 10:36 AM   Result Value Ref Range    Total CK 23 (L) 39 - 308 U/L   Protime-INR    Collection Time: 11/10/23 10:36 AM   Result Value Ref Range    Protime 14.7 (H) 11.6 - 14.5 seconds    INR 1.16 0.84 - 1.19   APTT    Collection Time: 11/10/23 10:36 AM   Result Value Ref Range    PTT 25 23 - 37 seconds   POCT Blood Gas (CG8+)    Collection Time: 11/10/23 10:39 AM   Result Value Ref Range    ph, Darvin ISTAT 7.415 (H) 7.300 - 7.400    pCO2, Darvin i-STAT 46.5 42.0 - 50.0 mm HG    pO2, Darvin i-STAT 26.0 (L) 35.0 - 45.0 mm HG    BE, i-STAT 5 (H) -2 - 3 mmol/L    HCO3, Darvin i-STAT 29.8 24.0 - 30.0 mmol/L    CO2, i-STAT 31 21 - 32 mmol/L    O2 Sat, i-STAT 47 (L) 60 - 85 %    SODIUM, I-STAT 143 136 - 145 mmol/l    Potassium, i-STAT 3.0 (L) 3.5 - 5.3 mmol/L    Calcium, Ionized i-STAT 1.13 1.12 - 1.32 mmol/L    Hct, i-STAT 34 (L) 36.5 - 49.3 %    Hgb, i-STAT 11.6 (L) 12.0 - 17.0 g/dl    Glucose, i-STAT 118 65 - 140 mg/dl    Specimen Type VENOUS    CBC and differential    Collection Time: 11/10/23 10:53 AM   Result Value Ref Range    WBC 6.55 4.31 - 10.16 Thousand/uL    RBC 3.25 (L) 3.88 - 5.62 Million/uL    Hemoglobin 10.3 (L) 12.0 - 17.0 g/dL    Hematocrit 30.9 (L) 36.5 - 49.3 %    MCV 95 82 - 98 fL    MCH 31.7 26.8 - 34.3 pg    MCHC 33.3 31.4 - 37.4 g/dL    RDW 15.2 (H) 11.6 - 15.1 %    MPV 10.9 8.9 - 12.7 fL    Platelets 741 (L) 274 - 390 Thousands/uL    nRBC 0 /100 WBCs    Neutrophils Relative 54 43 - 75 %    Immat GRANS % 0 0 - 2 %    Lymphocytes Relative 33 14 - 44 %    Monocytes Relative 12 4 - 12 %    Eosinophils Relative 1 0 - 6 %    Basophils Relative 0 0 - 1 %    Neutrophils Absolute 3.49 1.85 - 7.62 Thousands/µL    Immature Grans Absolute 0.02 0.00 - 0.20 Thousand/uL    Lymphocytes Absolute 2.16 0.60 - 4.47 Thousands/µL    Monocytes Absolute 0.80 0.17 - 1.22 Thousand/µL    Eosinophils Absolute 0.07 0.00 - 0.61 Thousand/µL    Basophils Absolute 0.01 0.00 - 0.10 Thousands/µL   HS Troponin 0hr (reflex protocol)    Collection Time: 11/10/23 10:53 AM   Result Value Ref Range    hs TnI 0hr 32 "Refer to ACS Flowchart"- see link ng/L   ECG 12 lead    Collection Time: 11/10/23 10:58 AM   Result Value Ref Range    Ventricular Rate 73 BPM    Atrial Rate 73 BPM    MS Interval 160 ms    QRSD Interval 74 ms    QT Interval 436 ms    QTC Interval 480 ms    P Axis 80 degrees    QRS Axis 56 degrees    T Wave Axis -13 degrees   HS Troponin I 2hr    Collection Time: 11/10/23  1:04 PM   Result Value Ref Range    hs TnI 2hr 27 "Refer to ACS Flowchart"- see link ng/L    Delta 2hr hsTnI -5 <20 ng/L   Fingerstick Glucose (POCT)    Collection Time: 11/10/23  1:34 PM   Result Value Ref Range    POC Glucose 67 65 - 140 mg/dl   Fingerstick Glucose (POCT)    Collection Time: 11/10/23  4:18 PM   Result Value Ref Range    POC Glucose 182 (H) 65 - 140 mg/dl   Comprehensive metabolic panel    Collection Time: 11/10/23  4:53 PM   Result Value Ref Range    Sodium 142 135 - 147 mmol/L    Potassium 3.1 (L) 3.5 - 5.3 mmol/L    Chloride 105 96 - 108 mmol/L    CO2 32 21 - 32 mmol/L    ANION GAP 5 mmol/L    BUN 17 5 - 25 mg/dL    Creatinine 1.19 0.60 - 1.30 mg/dL    Glucose 151 (H) 65 - 140 mg/dL    Calcium 8.4 8.4 - 10.2 mg/dL    AST 10 (L) 13 - 39 U/L    ALT 13 7 - 52 U/L    Alkaline Phosphatase 65 34 - 104 U/L    Total Protein 5.8 (L) 6.4 - 8.4 g/dL    Albumin 3.6 3.5 - 5.0 g/dL    Total Bilirubin 0.48 0.20 - 1.00 mg/dL    eGFR 63 ml/min/1.73sq m   Magnesium    Collection Time: 11/10/23  4:53 PM   Result Value Ref Range    Magnesium 1.6 (L) 1.9 - 2.7 mg/dL   Lactic acid, plasma (w/reflex if result > 2.0)    Collection Time: 11/10/23  4:53 PM   Result Value Ref Range    LACTIC ACID 1.6 0.5 - 2.0 mmol/L   Fingerstick Glucose (POCT)    Collection Time: 11/10/23  9:15 PM   Result Value Ref Range    POC Glucose 227 (H) 65 - 140 mg/dl   Hemoglobin A1c w/EAG Estimation (Orders if not completed within the last 90 days)    Collection Time: 11/11/23  6:51 AM   Result Value Ref Range    Hemoglobin A1C 8.8 (H) Normal 4.0-5.6%; PreDiabetic 5.7-6.4%; Diabetic >=6.5%; Glycemic control for adults with diabetes <7.0% %     mg/dl   Basic metabolic panel    Collection Time: 11/11/23  6:51 AM   Result Value Ref Range    Sodium 139 135 - 147 mmol/L    Potassium 3.8 3.5 - 5.3 mmol/L    Chloride 102 96 - 108 mmol/L    CO2 29 21 - 32 mmol/L    ANION GAP 8 mmol/L    BUN 19 5 - 25 mg/dL    Creatinine 1.04 0.60 - 1.30 mg/dL    Glucose 218 (H) 65 - 140 mg/dL    Calcium 8.6 8.4 - 10.2 mg/dL    eGFR 74 ml/min/1.73sq m   Magnesium    Collection Time: 11/11/23  6:51 AM   Result Value Ref Range    Magnesium 2.0 1.9 - 2.7 mg/dL   CBC and Platelet    Collection Time: 11/11/23  6:51 AM   Result Value Ref Range    WBC 7.22 4.31 - 10.16 Thousand/uL    RBC 4.02 3.88 - 5.62 Million/uL    Hemoglobin 12.5 12.0 - 17.0 g/dL    Hematocrit 38.0 36.5 - 49.3 %    MCV 95 82 - 98 fL    MCH 31.1 26.8 - 34.3 pg    MCHC 32.9 31.4 - 37.4 g/dL    RDW 15.3 (H) 11.6 - 15.1 %    Platelets 175 660 - 877 Thousands/uL    MPV 10.7 8.9 - 12.7 fL   Fingerstick Glucose (POCT)    Collection Time: 11/11/23  7:41 AM   Result Value Ref Range    POC Glucose 202 (H) 65 - 140 mg/dl     Left Ventricle Left ventricular cavity size is normal. Wall thickness is normal. The left ventricular ejection fraction is 65%. Systolic function is normal.  Wall motion is normal. Diastolic function is mildly abnormal, consistent with grade I (abnormal) relaxation. Right Ventricle Right ventricular cavity size is normal. Systolic function is normal. Wall thickness is normal.   Left Atrium The atrium is mildly dilated. Right Atrium The atrium is normal in size. Aortic Valve The aortic valve is trileaflet. The leaflets are not thickened. The leaflets are mildly calcified. The leaflets exhibit normal mobility. There is trace regurgitation. The aortic valve has no significant stenosis. Mitral Valve The leaflets are not thickened. The leaflets are not calcified. The leaflets exhibit normal mobility. There is mild annular calcification. There is trace regurgitation. There is no evidence of stenosis. Tricuspid Valve Tricuspid valve structure is normal. There is no evidence of regurgitation. There is no evidence of stenosis. Pulmonic Valve Pulmonic valve structure is normal. There is no evidence of regurgitation. There is no evidence of stenosis. Ascending Aorta The aortic root is normal in size. IVC/SVC The inferior vena cava is normal in size. Pericardium There is a trivial pericardial effusion. The pericardium is normal in appearance. Imaging: I have personally reviewed pertinent reports. EKG: NSR PAC 's inferolateral st depresion  VTE Prophylaxis:   eliquis  Code Status: Level 1 - Full Code  Advance Directive and Living Will:      Power of :    POLST:      Counseling / Coordination of Care  Total floor / unit time spent today 45 minutes. Greater than 50% of total time was spent with the patient and / or family counseling and / or coordination of care.

## 2023-11-11 NOTE — UTILIZATION REVIEW
Initial Clinical Review    Admission: Date/Time/Statement:   Admission Orders (From admission, onward)       Ordered        11/10/23 1636  Inpatient Admission  Once                          Orders Placed This Encounter   Procedures    Inpatient Admission     Standing Status:   Standing     Number of Occurrences:   1     Order Specific Question:   Level of Care     Answer:   Level 2 Stepdown / HOT [14]     Order Specific Question:   Estimated length of stay     Answer:   More than 2 Midnights     Order Specific Question:   Certification     Answer:   I certify that inpatient services are medically necessary for this patient for a duration of greater than two midnights. See H&P and MD Progress Notes for additional information about the patient's course of treatment. ED Arrival Information       Expected   -    Arrival   11/10/2023 10:23    Acuity   Emergent              Means of arrival   Ambulance    Escorted by   [facility]    Service   Hospitalist    Admission type   Emergency              Arrival complaint   -             Chief Complaint   Patient presents with    Trauma     Pt fall on eliquis       Initial Presentation: 79 y.o. male who presented  to [facility] ED. Inpatient admission for evaluation and treatment of Adrenal insufficiency and Hypotension. PMHx:  has a past medical history of Adrenal insufficiency  Atrial fibrillation , and Diabetes mellitus Type 1, orthostatic hypotension, Hx of CVA,   Presented  to the Ed following a syncopal episode at home. He reportedly fell in the bathroom  this morning. He then went to a chair where he became unresponsive with blue lips and ahallow breathing. EMS was called, and coted a SBP in the 70s. IN the ED  122/64. At 1608 his bp dropped to 71/43. Traumatic work up as negative and he is admitted to internal medicine due to his syncope and hypotension.        Date: 11/11/2023  Day 2:  Pt presented s/p fall on Eliquis, reportedly a syncopal episode. He was hypotensive. Per EMS. Continue to monitor for his syncope, hypotension. Continue Midodrine tid , florinef qd, NaCl tid, transition to oral hydrocortisone 30mg am and 125mg pm  Compression stockings and abd binder. Endocrine to adjust insulin regimen. Cardiology Consult - 11/11 - 75yo m with hx of orthostatic hypotension. PAF s/p syncopal episode. Continue midodrine and florinef. Sodium chloride tablets. He wears compression stockings at home. To consider thigh high if he continues symptomatic orthostasis. Endocrinology Consult - 11/11 - Hx of brittle T1 DM, complicated by autonomic neuropathy and secondary adrenal insufficiency. Plan Home regimen: Lantus 16 units qhs, Humalog Kwik Pen 7-7-7 units with meals. Hydrocortisone 20mg am and 15 mg pm  - plan to increase to 30 mg am and 15 mg po . Monitor accu-checks and adjust insulin regimen as needed. Date: 11/12/2023   Day 3: Has surpassed a 2nd midnight with active treatments and services, which include  Hydrocortisone , Insulin regimen adjusted- lantus to 15 units Hs, Humalog to 5 units tid/meals, and correctional insulin.  .      ED Triage Vitals   Temperature Pulse Respirations Blood Pressure SpO2   11/10/23 1026 11/10/23 1026 11/10/23 1026 11/10/23 1026 11/10/23 1026   98.4 °F (36.9 °C) 70 18 122/64 99 %      Temp Source Heart Rate Source Patient Position - Orthostatic VS BP Location FiO2 (%)   11/10/23 1026 11/10/23 1026 11/10/23 1026 11/10/23 1615 --   Tympanic Monitor Lying Right arm       Pain Score       11/10/23 1026       No Pain          Wt Readings from Last 1 Encounters:   11/10/23 68 kg (150 lb)     Additional Vital Signs:   Date/Time Temp Pulse Resp BP MAP (mmHg) SpO2 O2 Device Patient Position - Orthostatic VS   11/12/23 11:25:01 97.7 °F (36.5 °C) 71 16 133/72 92 100 % -- --   11/12/23 09:54:52 -- 76 -- 70/53 Abnormal  92 99 % -- --   11/12/23 09:52:44 -- 76 -- 131/72 92 99 % -- -- 11/12/23 09:51:02 -- 71 -- -- -- 98 % -- --   11/12/23 09:50:40 -- 77 -- -- -- 99 % -- --   11/12/23 09:48:31 -- 70 -- 99/56 70 99 % -- --   11/12/23 09:46:25 -- 75 -- 155/76 102 100 % -- --   11/12/23 07:36:22 -- 77 -- 99/58 72 98 % -- Standing - Orthostatic VS   11/12/23 07:34:26 -- 71 -- 159/88 112 96 % -- Sitting - Orthostatic VS   11/12/23 0730 97.5 °F (36.4 °C) 77 16 210/110 Abnormal  143 96 % -- Lying - Orthostatic VS   11/11/23 21:31:39 97.4 °F (36.3 °C) Abnormal  79 -- 96/46 Abnormal  63 Abnormal  99 % -- Standing   11/11/23 21:30:02 97.4 °F (36.3 °C) Abnormal  77 -- 111/75 87 100 % -- Sitting   11/11/23 21:28:27 97.4 °F (36.3 °C) Abnormal  74 18 136/115 Abnormal  122 99 % -- Lying   11/11/23 17:28:46 98.3 °F (36.8 °C) 79 16 165/78 107 99 % -- --   11/11/23 1628 -- 72 -- 181/86 Abnormal  129 98 % -- --   11/11/23 1601 97.5 °F (36.4 °C) -- -- -- -- -- -- --     Date/Time Temp Pulse Resp BP MAP (mmHg) SpO2 O2 Device Patient Position - Orthostatic VS   11/11/23 1202 -- 76 18 118/64 -- -- -- Sitting - Orthostatic VS   11/11/23 1100 98.4 °F (36.9 °C) 76 20 170/79 114 99 % None (Room air) Lying   11/11/23 1007 -- 78 -- 139/71 97 -- -- Sitting - Orthostatic VS   11/11/23 1006 -- -- -- 78/49 Abnormal  -- -- -- Standing - Orthostatic VS   11/11/23 1005 -- -- -- 145/75 -- -- -- Sitting - Orthostatic VS   11/11/23 0815 -- 78 20 143/80 102 98 % -- Sitting   11/11/23 0730 -- 74 20 181/89 Abnormal  123 98 % -- Lying   11/11/23 0715 99.8 °F (37.7 °C) 77 18 181/89 Abnormal  -- 99 % -- Lying   11/11/23 0315 97.5 °F (36.4 °C) 72 -- 165/81 117 98 % -- --   11/10/23 2100 -- 70 18 142/67 -- 98 % -- --   11/10/23 2000 -- 64 18 147/67 96 97 % None (Room air) --   11/10/23 1900 -- 68 18 131/63 90 98 % None (Room air) --   11/10/23 1800 -- 72 18 126/60 87 99 % None (Room air) Sitting   11/10/23 1700 -- 70 18 158/64 92 98 % -- --   11/10/23 1615 -- 66 18 75/47 Abnormal  56 Abnormal  98 % None (Room air) Sitting   11/10/23 1608 -- -- -- 71/43 Abnormal    -- -- -- --   BP: trauma resident aware at 11/10/23 1608   11/10/23 1600 -- 66 -- 79/49 Abnormal  -- 98 % -- --   11/10/23 1500 -- 68 16 108/55 -- 97 % None (Room air) Lying   11/10/23 1300 -- 68 16 163/68 98 98 % None (Room air) --   11/10/23 12:30:20 -- 66 -- 146/69 -- 99 % None (Room air) --   11/10/23 12:00:20 -- 70 13 151/65 -- 99 % None (Room air) --   11/10/23 11:30:20 -- 70 15 134/69 -- 99 % None (Room air) Lying   11/10/23 11:15:20 -- 70 13 166/73 -- 99 % None (Room air) --   11/10/23 1100 -- 73 18 147/65 -- 99 % None (Room air) Lying   11/10/23 1045 -- 66 18 136/67 -- 99 % None (Room air) --   11/10/23 1035 -- 66 18 126/52 -- 97 % None (Room air) --   11/10/23 10:26:04 98.4 °F (36.9 °C) 70 18 122/64 -- 99 % None (Room air) Lying           Pertinent Labs/Diagnostic Test Results:   TRAUMA - CT head wo contrast   Final Result by Macrina Hoover MD (11/10 4856)      No intracranial hemorrhage or calvarial fracture. I personally discussed this study with Nickie Arguelles on 11/10/2023 11:37 AM.                        Workstation performed: [id number]         TRAUMA - CT spine cervical wo contrast   Final Result by Macrina Hoover MD (11/10 3569)      No cervical spine fracture or traumatic malalignment. I personally discussed this study with Nickie Arguelles on 11/10/2023 11:37 AM.            Workstation performed: [id number]         TRAUMA - CT chest abdomen pelvis w contrast   Final Result by Macrina Hoover MD (11/10 5441)      No acute traumatic findings in the chest, abdomen, or pelvis. I personally discussed this study with Nickie Arguelles on 11/10/2023 11:37 AM.            Workstation performed: [id number]         XR Trauma multiple (SLB/SLRA trauma bay ONLY)   Final Result by Macrina Hoover MD (11/10 1146)      1. No acute cardiopulmonary disease within limitations of supine imaging. 2.  No acute osseous abnormality. Workstation performed: [id number]         XR chest 1 view   Final Result by Lee Quintero MD (11/10 1146)      1. No acute cardiopulmonary disease within limitations of supine imaging. 2.  No acute osseous abnormality.       Workstation performed: [id number]         XR pelvis ap only 1 or 2 vw    (Results Pending)         Results from last 7 days   Lab Units 11/11/23  0651 11/10/23  1053 11/10/23  1039   WBC Thousand/uL 7.22 6.55  --    HEMOGLOBIN g/dL 12.5 10.3*  --    I STAT HEMOGLOBIN g/dl  --   --  11.6*   HEMATOCRIT % 38.0 30.9*  --    HEMATOCRIT, ISTAT %  --   --  34*   PLATELETS Thousands/uL 161 139*  --    NEUTROS ABS Thousands/µL  --  3.49  --          Results from last 7 days   Lab Units 11/11/23  0651 11/10/23  1653 11/10/23  1039   SODIUM mmol/L 139 142  --    POTASSIUM mmol/L 3.8 3.1*  --    CHLORIDE mmol/L 102 105  --    CO2 mmol/L 29 32  --    CO2, I-STAT mmol/L  --   --  31   ANION GAP mmol/L 8 5  --    BUN mg/dL 19 17  --    CREATININE mg/dL 1.04 1.19  --    EGFR ml/min/1.73sq m 74 63  --    CALCIUM mg/dL 8.6 8.4  --    CALCIUM, IONIZED, ISTAT mmol/L  --   --  1.13   MAGNESIUM mg/dL 2.0 1.6*  --      Results from last 7 days   Lab Units 11/10/23  1653   AST U/L 10*   ALT U/L 13   ALK PHOS U/L 65   TOTAL PROTEIN g/dL 5.8*   ALBUMIN g/dL 3.6   TOTAL BILIRUBIN mg/dL 0.48     Results from last 7 days   Lab Units 11/11/23  1113 11/11/23  0741 11/10/23  2115 11/10/23  1618 11/10/23  1334 11/10/23  1029   POC GLUCOSE mg/dl 310* 202* 227* 182* 67 118     Results from last 7 days   Lab Units 11/11/23  0651 11/10/23  1653   GLUCOSE RANDOM mg/dL 218* 151*         Results from last 7 days   Lab Units 11/11/23  0651   HEMOGLOBIN A1C % 8.8*   EAG mg/dl 206       Results from last 7 days   Lab Units 11/10/23  1039   PH, SILVIO I-STAT  7.415*   PCO2, SILVIO ISTAT mm HG 46.5   PO2, SILVIO ISTAT mm HG 26.0*   HCO3, SILVIO ISTAT mmol/L 29.8   I STAT BASE EXC mmol/L 5*   I STAT O2 SAT % 47*     Results from last 7 days   Lab Units 11/10/23  1036   CK TOTAL U/L 23*     Results from last 7 days   Lab Units 11/10/23  1304 11/10/23  1053   HS TNI 0HR ng/L  --  32   HS TNI 2HR ng/L 27  --    HSTNI D2 ng/L -5  --          Results from last 7 days   Lab Units 11/10/23  1036   PROTIME seconds 14.7*   INR  1.16   PTT seconds 25       Results from last 7 days   Lab Units 11/10/23  1653   LACTIC ACID mmol/L 1.6         ED Treatment:   Medication Administration from 11/10/2023 1016 to 11/10/2023 2136         Date/Time Order Dose Route Action Comments     11/10/2023 1059 EST iohexol (OMNIPAQUE) 350 MG/ML injection (MULTI-DOSE) 100 mL 100 mL Intravenous Given --     11/10/2023 1631 EST midodrine (PROAMATINE) tablet 15 mg 15 mg Oral Given --     11/10/2023 1613 EST sodium chloride 0.9 % bolus 500 mL 500 mL Intravenous New Bag --     11/10/2023 1633 EST hydrocortisone (Solu-CORTEF) injection 100 mg 100 mg Intravenous Given --     11/10/2023 2114 EST apixaban (ELIQUIS) tablet 5 mg 5 mg Oral Given --     11/10/2023 1711 EST fludrocortisone (FLORINEF) tablet 0.2 mg 0.2 mg Oral Given --     11/10/2023 1711 EST insulin lispro (HumaLOG) 100 units/mL subcutaneous injection 1-6 Units 1 Units Subcutaneous Given --     11/10/2023 2121 EST insulin lispro (HumaLOG) 100 units/mL subcutaneous injection 1-5 Units 2 Units Subcutaneous Given --     11/10/2023 1711 EST insulin lispro (HumaLOG) 100 units/mL subcutaneous injection 3 Units 3 Units Subcutaneous Given --     11/10/2023 1711 EST calcitriol (ROCALTROL) capsule 0.25 mcg 0.25 mcg Oral Given --     11/10/2023 1711 EST sodium chloride tablet 1 g 1 g Oral Given --     11/10/2023 2124 EST potassium chloride (K-DUR,KLOR-CON) CR tablet 40 mEq 40 mEq Oral Given --          Past Medical History:   Diagnosis Date    Adrenal insufficiency (HCC)     Atrial fibrillation ([address])     Diabetes mellitus ([address])      Present on Admission:   Hypotension/syncope   Fall   Adrenal insufficiency (HCC)   Orthostatic hypotension   Type 1 diabetes mellitus with hypoglycemia (HCC)   Paroxysmal atrial fibrillation (HCC)      Admitting Diagnosis: Adrenal insufficiency ([address]) [E27.40]  Multiple injuries [T07. XXXA]  Hypotension [I95.9]  Age/Sex: 79 y.o. male        Admission Orders:  Scheduled Medications:  apixaban, 5 mg, Oral, BID  aspirin, 81 mg, Oral, Daily  calcitriol, 0.25 mcg, Oral, Daily  vitamin B-12, 100 mcg, Oral, Daily  ferrous sulfate, 325 mg, Oral, Daily With Breakfast  fludrocortisone, 0.2 mg, Oral, Daily  hydrocortisone, 15 mg, Oral, Daily  [START ON 11/12/2023] hydrocortisone, 30 mg, Oral, Daily  insulin glargine, 10 Units, Subcutaneous, HS  insulin lispro, 1-5 Units, Subcutaneous, HS  insulin lispro, 1-6 Units, Subcutaneous, TID With Meals  insulin lispro, 3 Units, Subcutaneous, TID With Meals  midodrine, 15 mg, Oral, TID AC  risperiDONE, 1 mg, Oral, BID  sodium chloride, 1 g, Oral, TID With Meals  Solu-Cortef 100 mg iv 11/10 x 1  Solu-Cortef 50 mg iv  11/10 x 1 - 11/11 x 1   Mag Sulfate 2G IV Once - 11/10 x1  K dur 40 meq po once - 11/10 x 1        Continuous IV Infusions:     PRN Meds:         Network Utilization Review Department  ATTENTION: Please call with any questions or concerns to [phone] and carefully listen to the prompts so that you are directed to the right person. All voicemails are confidential.   For Discharge needs, contact Care Management DC Support Team at [phone] opt. 2  Send all requests for admission clinical reviews, approved or denied determinations and any other requests to dedicated fax number below belonging to the campus where the patient is receiving treatment.  List of dedicated fax numbers for the Facilities:  [place] DENIALS (Administrative/Medical Necessity) [phone]   DISCHARGE SUPPORT TEAM (NETWORK) [phone] [address] (Maternity/NICU/Pediatrics) [facility] [phone]   [facility] [address] [phone] 150[facility] [address] [phone]   [address] [address] [phone]

## 2023-11-11 NOTE — ASSESSMENT & PLAN NOTE
Lab Results   Component Value Date    HGBA1C 8.8 (H) 11/11/2023       Recent Labs     11/11/23  0741 11/11/23  1113 11/11/23  1547 11/11/23  1745   POCGLU 202* 310* 292* 247*         Blood Sugar Average: Last 72 hrs:  (P) 205.625  On Toujeo 20 at bedtime, lispro 6 - 7 - 7 at home    Will receive higher dose of Lantus 10 units at bedtime today  Humalog 3 units 3 times daily  Monitor blood sugars and adjust insulin as needed  Endocrinology following, appreciate recommendations  Diabetic diet  Accu-Cheks plus correctional scale

## 2023-11-11 NOTE — ASSESSMENT & PLAN NOTE
On hydrocortisone 20 mg in the morning and 50 mg in the evening and fludrocortisone 0.2 mg daily at home  Hydrocortisone increased in the setting of hypotension - received 100 mg IV with improvement in BP - decrease to 50 mg TID and taper to home dose  Ct Fludricortisone 0.2 mg daily

## 2023-11-11 NOTE — H&P
[address] [facility]  H&P  Name: Marina Montero 79 y.o. male I MRN: [id number]  Unit/Bed#: ICCU 209-01 I Date of Admission: 11/10/2023   Date of Service: 11/10/2023  Hospital Day: 0      Assessment/Plan   * Hypotension/syncope  Assessment & Plan  Per discussion with his daughter fell in the bathroom this morning  Later he was taken to a chair when he had a syncope with blue lips and shallow breaths and EMS was called  EMS noted SBP in the 70's  H/o orthostatic hypotension, adrenal insufficiency and follows with endocrine and cardiology as outpatient  BP had stabilized for some months on Midodrine 15 mg TID, Hydrocortisone 20 in qm/15 pm and Fludrocortisone 0.2 mg daily  BP on arrival was 122/64 but dropped to 71/43 at 1608  BP improved with IV hydrocortisone, IVF's, Midodrine  Ct stress dose hydrocortisone - taper from 11/11 if BP stable  Ct home Midodrine 15 mg TID  Endo/cardiology reassessment    Fall  Assessment & Plan  Presented as a level A trauma alert after a fall at home  Discussed with trauma - no acute traumatic injury, admit to medicine  Work-up as above    Adrenal insufficiency ([address])  Assessment & Plan  On hydrocortisone 20 mg in the morning and 50 mg in the evening and fludrocortisone 0.2 mg daily at home  Hydrocortisone increased in the setting of hypotension - received 100 mg IV with improvement in BP - decrease to 50 mg TID and taper to home dose  Ct Fludricortisone 0.2 mg daily    Orthostatic hypotension  Assessment & Plan  Continue home med Midodrine 15 mg 3 times daily  Rest of plan as above  Check orthostatic vitals    Type 1 diabetes mellitus with hypoglycemia [facility])  Assessment & Plan  No results found for: "HGBA1C"    Recent Labs     11/10/23  1029 11/10/23  1334 11/10/23  1618 11/10/23  2115   POCGLU 118 67 182* 227*       Blood Sugar Average: Last 72 hrs:  (P) 148.5  On Toujeo 20 at bedtime, lispro 6 - 7 - 7 at home  Blood sugar 67 this afternoon  Received Lantus at decreased dose of 6 tonight  Increase Lantus to 10 at bedtime  Humalog 3 units 3 times daily  Monitor blood sugars and adjust insulin as needed    History of CVA (cerebrovascular accident)  Assessment & Plan  Continue Eliquis    Paroxysmal atrial fibrillation (HCC)  Assessment & Plan  Continue Eliquis         VTE Pharmacologic Prophylaxis: VTE Score: 3 Moderate Risk (Score 3-4) - Pharmacological DVT Prophylaxis Ordered: apixaban (Eliquis). Code Status: Level 1 - Full Code   Discussion with family: Updated  (daughter) via phone. Updated daughter Ramon Harper on the phone initially. Later ED was contacted by her son Philip Dunlap for information and per his request update was given to his wife Chante Ramirez    Anticipated Length of Stay: Patient will be admitted on an inpatient basis with an anticipated length of stay of greater than 2 midnights secondary to syncope/hypotension    Total Time Spent on Date of Encounter in care of patient: 77 mins. This time was spent on one or more of the following: performing physical exam; counseling and coordination of care; obtaining or reviewing history; documenting in the medical record; reviewing/ordering tests, medications or procedures; communicating with other healthcare professionals and discussing with patient's family/caregivers. Chief Complaint: Fall, syncope     History of Present Illness:  Nereyda Vargas is a 79 y.o. male with a PMH of type 1 diabetes, adrenal insufficiency, orthostatic hypotension, history of CVA, paroxysmal atrial fibrillation who presents following a syncopal event at home. History obtained through discussion with his daughter Ramon Harper. She states that this morning he fell in the bathroom. He was subsequently taken to a chair where he became unresponsive with blue lips and shallow breathing. EMS was then called who noted a systolic blood pressure in the 70s. Blood pressure on arrival to the ED was 122/64 mmHg. He was a level a trauma alert. Traumatic work-up was negative and admission was requested under internal medicine. At [phone] his blood pressure dropped again to 71/43 mmHg. He was asymptomatic at that time. No palpitations, lightheadedness, shortness of breath or chest pain. Review of Systems:  Review of Systems   Neurological:  Positive for syncope. All other systems reviewed and are negative. Past Medical and Surgical History:   Past Medical History:   Diagnosis Date    Adrenal insufficiency ([address])     Atrial fibrillation ([address])     Diabetes mellitus ([address])        History reviewed. No pertinent surgical history. Meds/Allergies:  Prior to Admission medications    Medication Sig Start Date End Date Taking?  Authorizing Provider   apixaban (Eliquis) 5 mg Take 5 mg by mouth 2 (two) times a day   Yes Historical Provider, MD   aspirin 81 mg chewable tablet Chew 81 mg daily   Yes Historical Provider, MD   ferrous sulfate 325 (65 Fe) mg tablet Take 325 mg by mouth daily with breakfast   Yes Historical Provider, MD   fludrocortisone (FLORINEF) 0.1 mg tablet Take 0.2 mg by mouth in the morning   Yes Historical Provider, MD   hydrocortisone (CORTEF) 5 mg tablet Take 20 mg by mouth daily And 15 mg in the evening   Yes Historical Provider, MD   insulin glargine (Toujeo Max SoloStar) 300 units/mL CONCENTRATED U-300 injection pen (2-unit dial) Inject 20 Units under the skin daily at bedtime   Yes Historical Provider, MD   insulin lispro (HumaLOG) 100 units/mL injection Inject 6 Units under the skin 3 (three) times a day with meals   Yes Historical Provider, MD   midodrine (PROAMATINE) 5 mg tablet Take 15 mg by mouth 3 (three) times a day before meals   Yes Historical Provider, MD   risperiDONE (RisperDAL) 1 mg tablet Take 1 mg by mouth 2 (two) times a day   Yes Historical Provider, MD   sodium chloride 1 g tablet Take 1 g by mouth 2 (two) times a day   Yes Historical Provider, MD     I have reviewed home medications with patient family member. Allergies: Not on File    Social History:  Marital Status: Single   Substance Use History:   Social History     Substance and Sexual Activity   Alcohol Use Not Currently     Social History     Tobacco Use   Smoking Status Former    Types: Cigarettes   Smokeless Tobacco Never     Social History     Substance and Sexual Activity   Drug Use Never       Family History:  Nil contributory    Physical Exam:     Vitals:   Blood Pressure: 142/67 (11/10/23 2100)  Pulse: 70 (11/10/23 2100)  Temperature: 98.4 °F (36.9 °C) (11/10/23 1026)  Temp Source: Tympanic (11/10/23 1026)  Respirations: 18 (11/10/23 2100)  Height: 5' 4" (162.6 cm) (11/10/23 2030)  Weight - Scale: 68 kg (150 lb) (11/10/23 2030)  SpO2: 98 % (11/10/23 2100)    Physical Exam  Vitals reviewed. HENT:      Head: Normocephalic. Nose: Nose normal.      Mouth/Throat:      Mouth: Mucous membranes are moist.   Eyes:      Extraocular Movements: Extraocular movements intact. Cardiovascular:      Rate and Rhythm: Normal rate and regular rhythm. Pulmonary:      Effort: Pulmonary effort is normal. No respiratory distress. Breath sounds: Normal breath sounds. Abdominal:      General: Bowel sounds are normal. There is no distension. Palpations: Abdomen is soft. Tenderness: There is no abdominal tenderness. Musculoskeletal:         General: No swelling. Cervical back: Neck supple. Skin:     General: Skin is warm and dry. Neurological:      General: No focal deficit present. Mental Status: He is alert and oriented to person, place, and time.    Psychiatric:         Mood and Affect: Mood normal.         Behavior: Behavior normal.          Additional Data:     Lab Results:  Results from last 7 days   Lab Units 11/10/23  1053   WBC Thousand/uL 6.55   HEMOGLOBIN g/dL 10.3*   HEMATOCRIT % 30.9*   PLATELETS Thousands/uL 139*   NEUTROS PCT % 54   LYMPHS PCT % 33   MONOS PCT % 12   EOS PCT % 1     Results from last 7 days   Lab Units 11/10/23  1653   SODIUM mmol/L 142   POTASSIUM mmol/L 3.1*   CHLORIDE mmol/L 105   CO2 mmol/L 32   BUN mg/dL 17   CREATININE mg/dL 1.19   ANION GAP mmol/L 5   CALCIUM mg/dL 8.4   ALBUMIN g/dL 3.6   TOTAL BILIRUBIN mg/dL 0.48   ALK PHOS U/L 65   ALT U/L 13   AST U/L 10*   GLUCOSE RANDOM mg/dL 151*     Results from last 7 days   Lab Units 11/10/23  1036   INR  1.16     Results from last 7 days   Lab Units 11/10/23  2115 11/10/23  1618 11/10/23  1334 11/10/23  1029   POC GLUCOSE mg/dl 227* 182* 67 118         Results from last 7 days   Lab Units 11/10/23  1653   LACTIC ACID mmol/L 1.6       Lines/Drains:  Invasive Devices       Peripheral Intravenous Line  Duration             Peripheral IV 11/10/23 Dorsal (posterior); Left Hand <1 day    Peripheral IV 11/10/23 Dorsal (posterior); Right Hand <1 day                        Imaging: Reviewed radiology reports from this admission including: chest CT scan, abdominal/pelvic CT, and CT head  TRAUMA - CT head wo contrast   Final Result by Yen Briseno MD (11/10 8312)      No intracranial hemorrhage or calvarial fracture. I personally discussed this study with Olegario Vick on 11/10/2023 11:37 AM.                        Workstation performed: [id number]         TRAUMA - CT spine cervical wo contrast   Final Result by Yen Briseno MD (11/10 4457)      No cervical spine fracture or traumatic malalignment. I personally discussed this study with Olegario Vick on 11/10/2023 11:37 AM.            Workstation performed: [id number]         TRAUMA - CT chest abdomen pelvis w contrast   Final Result by Yen Briseno MD (11/10 8895)      No acute traumatic findings in the chest, abdomen, or pelvis.       I personally discussed this study with Olegario Vick on 11/10/2023 11:37 AM.            Workstation performed: [id number]         XR Trauma multiple (SLB/SLRA trauma bay ONLY)   Final Result by Yen Briseno MD (11/10 5594) 1.  No acute cardiopulmonary disease within limitations of supine imaging. 2.  No acute osseous abnormality. Workstation performed: [id number]         XR chest 1 view   Final Result by Zuleika Bryan MD (11/10 1146)      1. No acute cardiopulmonary disease within limitations of supine imaging. 2.  No acute osseous abnormality. Workstation performed: [id number]         XR pelvis ap only 1 or 2 vw    (Results Pending)       ** Please Note: This note has been constructed using a voice recognition system.  **

## 2023-11-11 NOTE — ASSESSMENT & PLAN NOTE
Continue home med Midodrine 15 mg 3 times daily  Rest of plan as above  Compression stockings and abdominal binder as ordered

## 2023-11-11 NOTE — ED NOTES
Patient's family Philip Dunlap has called multiple time and is screaming at staff with a secondary person yelling in the background. He has been given information regarding patient's admission. Dr Honey Wang (admitting physician) has called his daughter Ramon Harper and has been given extensive explanations of why patient is being admitted. Security is aware.      Caitlin Peres RN  11/10/23 2100

## 2023-11-11 NOTE — PLAN OF CARE
Problem: PAIN - ADULT  Goal: Verbalizes/displays adequate comfort level or baseline comfort level  Description: Interventions:  - Encourage patient to monitor pain and request assistance  - Assess pain using appropriate pain scale  - Administer analgesics based on type and severity of pain and evaluate response  - Implement non-pharmacological measures as appropriate and evaluate response  - Consider cultural and social influences on pain and pain management  - Notify physician/advanced practitioner if interventions unsuccessful or patient reports new pain  Outcome: Progressing     Problem: INFECTION - ADULT  Goal: Absence or prevention of progression during hospitalization  Description: INTERVENTIONS:  - Assess and monitor for signs and symptoms of infection  - Monitor lab/diagnostic results  - Monitor all insertion sites, i.e. indwelling lines, tubes, and drains  - Monitor endotracheal if appropriate and nasal secretions for changes in amount and color  - [place] appropriate cooling/warming therapies per order  - Administer medications as ordered  - Instruct and encourage patient and family to use good hand hygiene technique  - Identify and instruct in appropriate isolation precautions for identified infection/condition  Outcome: Progressing     Problem: INFECTION - ADULT  Goal: Absence of fever/infection during neutropenic period  Description: INTERVENTIONS:  - Monitor WBC    Outcome: Progressing     Problem: SAFETY ADULT  Goal: Patient will remain free of falls  Description: INTERVENTIONS:  - Educate patient/family on patient safety including physical limitations  - Instruct patient to call for assistance with activity   - Consult OT/PT to assist with strengthening/mobility   - Keep Call bell within reach  - Keep bed low and locked with side rails adjusted as appropriate  - Keep care items and personal belongings within reach  - Initiate and maintain comfort rounds  - Make Fall Risk Sign visible to staff  - Offer Toileting every 2 Hours, in advance of need  - Initiate/Maintain bed alarm  - Obtain necessary fall risk management equipment:    Problem: Knowledge Deficit  Goal: Patient/family/caregiver demonstrates understanding of disease process, treatment plan, medications, and discharge instructions  Description: Complete learning assessment and assess knowledge base.   Interventions:  - Provide teaching at level of understanding  - Provide teaching via preferred learning methods  Outcome: Progressing     Problem: DISCHARGE PLANNING  Goal: Discharge to home or other facility with appropriate resources  Description: INTERVENTIONS:  - Identify barriers to discharge w/patient and caregiver  - Arrange for needed discharge resources and transportation as appropriate  - Identify discharge learning needs (meds, wound care, etc.)  - Arrange for interpretive services to assist at discharge as needed  - Refer to Case Management Department for coordinating discharge planning if the patient needs post-hospital services based on physician/advanced practitioner order or complex needs related to functional status, cognitive ability, or social support system  Outcome: Progressing     Problem: Prexisting or High Potential for Compromised Skin Integrity  Goal: Skin integrity is maintained or improved  Description: INTERVENTIONS:  - Identify patients at risk for skin breakdown  - Assess and monitor skin integrity  - Assess and monitor nutrition and hydration status  - Monitor labs   - Assess for incontinence   - Turn and reposition patient  - Assist with mobility/ambulation  - Relieve pressure over bony prominences  - Avoid friction and shearing  - Provide appropriate hygiene as needed including keeping skin clean and dry  - Evaluate need for skin moisturizer/barrier cream  - Collaborate with interdisciplinary team   - Patient/family teaching  - Consider wound care consult   Outcome: Progressing     - Apply yellow socks and bracelet for high fall risk patients  - Consider moving patient to room near nurses station  Outcome: Progressing

## 2023-11-11 NOTE — ASSESSMENT & PLAN NOTE
No results found for: "HGBA1C"    Recent Labs     11/10/23  1029 11/10/23  1334 11/10/23  1618 11/10/23  2115   POCGLU 118 67 182* 227*       Blood Sugar Average: Last 72 hrs:  (P) 148.5  On Toujeo 20 at bedtime, lispro 6 - 7 - 7 at home  Blood sugar 67 this afternoon  Received Lantus at decreased dose of 6 tonight  Increase Lantus to 10 at bedtime  Humalog 3 units 3 times daily  Monitor blood sugars and adjust insulin as needed

## 2023-11-11 NOTE — ASSESSMENT & PLAN NOTE
Presented as a level A trauma alert after a fall at home  Discussed with trauma - no acute traumatic injury, admit to medicine  Work-up as above

## 2023-11-11 NOTE — PROGRESS NOTES
Progress Note - Trauma Tertiary [place]   Isai Sparks 79 y.o. male [id number]   Unit/Bed#: ICCU 209-01 Encounter: [id number]     Assessment & Plan   Summary of Diagnosed Injuries: no traumatic injuries    PLAN:  Per medical team    VTE Prophylaxis:patients home eliquis    Disposition: continued evaluation and management per medical team    Code status:  Level 1 - Full Code    Consultants: IP CONSULT TO ENDOCRINOLOGY  IP CONSULT TO CARDIOLOGY     Subjective   Transfer from: \[facility]\"" ED    Mechanism of Injury:Fall     Chief Complaint: syncopal event, hypotension    HPI/Last 24 hour events: Patient presented to [facility] ED as a level A trauma alert by EMS for fall while on eliquis. Patient reportedly had a syncopal event and was hypotensive per EMS. Patient had no traumatic injuries on CT and was admitted to the medical service for further evaluation of his syncope / hypotension. Objective   Vitals:   Temp:  [97.5 °F (36.4 °C)-99.8 °F (37.7 °C)] 99.8 °F (37.7 °C)  HR:  [64-78] 78  Resp:  [13-20] 20  BP: ()/(43-89) 143/80    I/O         11/09 0701  11/10 0700 11/10 0701 11/11 0700 11/11 0701 11/12 0700    IV Piggyback  550     Total Intake(mL/kg)  550 (8.1)     Urine (mL/kg/hr)   300 (12.7)    Total Output   300    Net  +550 -300                    Physical Exam:   GENERAL APPEARANCE: Patient in no acute distress. HEENT: NCAT; PERRL, EOMs intact; Mucous membranes moist  NECK / BACK: no tenderness  CV: Regular rate and rhythm; no murmur/gallops/rubs appreciated. CHEST / LUNGS: Clear to auscultation; no wheezes/rales/rhonci. ABD: NABS; soft; non-distended; non-tender. : voiding  EXT: +2 pulses bilaterally upper & lower extremities; no edema. NEURO: GCS 15; no focal neurologic deficits; neurovascularly intact. SKIN: Warm, dry and well perfused; no rash; no jaundice. Invasive Devices       Peripheral Intravenous Line  Duration             Peripheral IV 11/10/23 Dorsal (posterior); Left Hand <1 day Peripheral IV 11/10/23 Dorsal (posterior); Right Hand <1 day                   Did you order a geriatric consult if the score was 2 or greater?: management per primary medical team         Lab Results: Results: I have personally reviewed all pertinent laboratory/tests results, BMP/CMP:   Lab Results   Component Value Date    SODIUM 139 11/11/2023    K 3.8 11/11/2023     11/11/2023    CO2 29 11/11/2023    CO2 31 11/10/2023    BUN 19 11/11/2023    CREATININE 1.04 11/11/2023    GLUCOSE 118 11/10/2023    CALCIUM 8.6 11/11/2023    AST 10 (L) 11/10/2023    ALT 13 11/10/2023    ALKPHOS 65 11/10/2023    EGFR 74 11/11/2023   , and CBC:   Lab Results   Component Value Date    WBC 7.22 11/11/2023    HGB 12.5 11/11/2023    HCT 38.0 11/11/2023    MCV 95 11/11/2023     11/11/2023    RBC 4.02 11/11/2023    MCH 31.1 11/11/2023    MCHC 32.9 11/11/2023    RDW 15.3 (H) 11/11/2023    MPV 10.7 11/11/2023    NRBC 0 11/10/2023       Imaging Results: I have personally reviewed pertinent reports.     Chest Xray(s): negative for acute findings   FAST exam(s): negative for acute findings   CT Scan(s): negative for acute findings   Additional Xray(s): N/A     Other Studies: N/A

## 2023-11-11 NOTE — PROGRESS NOTES
[address]  Progress Note  Name: Arpita Casper  MRN: [id number]  Unit/Bed#: [facility] 825-01 I Date of Admission: 11/10/2023   Date of Service: 11/11/2023 I Hospital Day: 1    Assessment/Plan   * Hypotension/syncope  Assessment & Plan  Per discussion with his daughter fell in the bathroom this morning  Later he was taken to a chair when he had a syncope with blue lips and shallow breaths and EMS was called  EMS noted SBP in the 70's  H/o autonomic dysfunction, orthostatic hypotension, adrenal insufficiency and follows with endocrine and cardiology as outpatient  Outpatient regimen: Midodrine 15 mg TID, Hydrocortisone 20 in qm/15 pm and Fludrocortisone 0.2 mg daily  Orthostatic positive in the hospital  BP improved with IV hydrocortisone, IVF's, Midodrine  Appreciate cardiology and endocrinology recommendations  Continue midodrine 15 mg 3 times daily  Continue Florinef 0.2 mg daily  Continue NaCl 1 g 3 times daily  Transition to oral hydrocortisone 30 mg in the morning and 15 mg in the p.m. Compression stockings and abdominal binder as ordered    Adrenal insufficiency (HCC)  Assessment & Plan  On hydrocortisone 20 mg in the morning and 15 mg in the evening and fludrocortisone 0.2 mg daily at home  Hydrocortisone increased in the setting of hypotension -received stress dose IV hydrocortisone    Plan:  Transition to oral hydrocortisone 30 mg in the morning and 15 mg in the evening  Continue with fludrocortisone 0.2 mg daily  Endocrinology following, appreciate recommendations    Paroxysmal atrial fibrillation ([address])  Assessment & Plan  Rate controlled.   Continue Eliquis    History of CVA (cerebrovascular accident)  Assessment & Plan  Continue Eliquis    Type 1 diabetes mellitus with hypoglycemia [facility])  Assessment & Plan  Lab Results   Component Value Date    HGBA1C 8.8 (H) 11/11/2023       Recent Labs     11/11/23  0741 11/11/23  1113 11/11/23  1547 11/11/23  1745   POCGLU 202* 310* 292* 247*         Blood Sugar Average: Last 72 hrs:  (P) 205.625  On Toujeo 20 at bedtime, lispro 6 - 7 - 7 at home    Will receive higher dose of Lantus 10 units at bedtime today  Humalog 3 units 3 times daily  Monitor blood sugars and adjust insulin as needed  Endocrinology following, appreciate recommendations  Diabetic diet  Accu-Cheks plus correctional scale    Orthostatic hypotension  Assessment & Plan  Continue home med Midodrine 15 mg 3 times daily  Rest of plan as above  Compression stockings and abdominal binder as ordered    Fall  Assessment & Plan  Presented as a level A trauma alert after a fall at home  Discussed with trauma - no acute traumatic injury, admit to medicine  Work-up as above           VTE Pharmacologic Prophylaxis: VTE Score: 3 Moderate Risk (Score 3-4) - Pharmacological DVT Prophylaxis Ordered: apixaban (Eliquis). Patient Centered Rounds: I performed bedside rounds with nursing staff today. Discussions with Specialists or Other Care Team Provider: endocrinology     Education and Discussions with Family / Patient: Updated  (daughter) via phone. Total Time Spent on Date of Encounter in care of patient: 35 mins. This time was spent on one or more of the following: performing physical exam; counseling and coordination of care; obtaining or reviewing history; documenting in the medical record; reviewing/ordering tests, medications or procedures; communicating with other healthcare professionals and discussing with patient's family/caregivers. Current Length of Stay: 1 day(s)  Current Patient Status: Inpatient   Certification Statement: The patient will continue to require additional inpatient hospital stay due to renal insufficiency and orthostatic hypotension with syncope  Discharge Plan: Anticipate discharge in 24-48 hrs to home. Code Status: Level 1 - Full Code    Subjective:   Patient assessed at bedside. No complaints.   Supine blood pressure in the 160-180 systolic. Per nurse, patient stood up and blood pressure dropped to 70 systolic. Objective:     Vitals:   Temp (24hrs), Av.3 °F (36.8 °C), Min:97.5 °F (36.4 °C), Max:99.8 °F (37.7 °C)    Temp:  [97.5 °F (36.4 °C)-99.8 °F (37.7 °C)] 98.3 °F (36.8 °C)  HR:  [64-79] 79  Resp:  [16-20] 16  BP: ()/(49-89) 165/78  SpO2:  [97 %-99 %] 99 %  Body mass index is 25.75 kg/m². Input and Output Summary (last 24 hours): Intake/Output Summary (Last 24 hours) at 2023 1813  Last data filed at 2023 1501  Gross per 24 hour   Intake 1870 ml   Output 850 ml   Net 1020 ml       Physical Exam:   Physical Exam  Vitals reviewed. Constitutional:       General: He is not in acute distress. Appearance: Normal appearance. He is not ill-appearing. Cardiovascular:      Rate and Rhythm: Normal rate and regular rhythm. Pulmonary:      Effort: Pulmonary effort is normal. No respiratory distress. Abdominal:      General: Bowel sounds are normal.      Tenderness: There is no abdominal tenderness. Musculoskeletal:      Right lower leg: No edema. Left lower leg: No edema. Skin:     General: Skin is warm and dry. Neurological:      Mental Status: He is alert and oriented to person, place, and time. Mental status is at baseline.    Psychiatric:         Mood and Affect: Mood normal.         Behavior: Behavior normal.          Additional Data:     Labs:  Results from last 7 days   Lab Units 23  0651 11/10/23  1053   WBC Thousand/uL 7.22 6.55   HEMOGLOBIN g/dL 12.5 10.3*   HEMATOCRIT % 38.0 30.9*   PLATELETS Thousands/uL 161 139*   NEUTROS PCT %  --  54   LYMPHS PCT %  --  33   MONOS PCT %  --  12   EOS PCT %  --  1     Results from last 7 days   Lab Units 23  0651 11/10/23  1653   SODIUM mmol/L 139 142   POTASSIUM mmol/L 3.8 3.1*   CHLORIDE mmol/L 102 105   CO2 mmol/L 29 32   BUN mg/dL 19 17   CREATININE mg/dL 1.04 1.19   ANION GAP mmol/L 8 5   CALCIUM mg/dL 8.6 8.4   ALBUMIN g/dL  --  3.6 TOTAL BILIRUBIN mg/dL  --  0.48   ALK PHOS U/L  --  65   ALT U/L  --  13   AST U/L  --  10*   GLUCOSE RANDOM mg/dL 218* 151*     Results from last 7 days   Lab Units 11/10/23  1036   INR  1.16     Results from last 7 days   Lab Units 11/11/23  1745 11/11/23  1547 11/11/23  1113 11/11/23  0741 11/10/23  2115 11/10/23  1618 11/10/23  1334 11/10/23  1029   POC GLUCOSE mg/dl 247* 292* 310* 202* 227* 182* 67 118     Results from last 7 days   Lab Units 11/11/23  0651   HEMOGLOBIN A1C % 8.8*     Results from last 7 days   Lab Units 11/10/23  1653   LACTIC ACID mmol/L 1.6       Lines/Drains:  Invasive Devices       Peripheral Intravenous Line  Duration             Peripheral IV 11/10/23 Dorsal (posterior); Left Hand 1 day    Peripheral IV 11/10/23 Dorsal (posterior); Right Hand 1 day                      Telemetry:  Telemetry Orders (From admission, onward)               Telemetry Monitoring  Continuous x 24 Hours (Telem)        Comments: hypotensive   Question:  Reason for 24 Hour Telemetry  Answer:  Arrhythmias requiring acute medical intervention / PPM or ICD malfunction                     Telemetry Reviewed: Normal Sinus Rhythm  Indication for Continued Telemetry Use: No indication for continued use. Will discontinue.               Imaging: Reviewed radiology reports from this admission including: abdominal/pelvic CT and CT head    Recent Cultures (last 7 days):         Last 24 Hours Medication List:   Current Facility-Administered Medications   Medication Dose Route Frequency Provider Last Rate    apixaban  5 mg Oral BID Keke Gomes MD      aspirin  81 mg Oral Daily Keke Gomes MD      calcitriol  0.25 mcg Oral Daily Keke Gomes MD      vitamin B-12  100 mcg Oral Daily Keke Gomes MD      ferrous sulfate  325 mg Oral Daily With Breakfast Keke Gomes MD      fludrocortisone  0.2 mg Oral Daily Keke Gomes MD      hydrocortisone  15 mg Oral Daily Keke Gomes MD      [START ON 11/12/2023] hydrocortisone  30 mg Oral Daily Laila Madrid MD      insulin glargine  10 Units Subcutaneous HS Laila Madrid MD      insulin lispro  1-5 Units Subcutaneous HS Laila Madrid MD      insulin lispro  1-6 Units Subcutaneous TID With Meals Laila Madrid MD      insulin lispro  3 Units Subcutaneous TID With Meals Laila Madrid MD      midodrine  15 mg Oral TID AC Laila Madrid MD      risperiDONE  1 mg Oral BID Laila Madrid MD      sodium chloride  1 g Oral TID With Meals Laila Madrid MD          Today, Patient Was Seen By: Daxa Azevedo DO    **Please Note: This note may have been constructed using a voice recognition system. **

## 2023-11-11 NOTE — CONSULTS
Consultation - Nicholas Carmichael 79 y.o. male MRN: [id number]    Unit/Bed#: [facility] 209-01 Encounter: [id number]      Assessment/Plan     Assessment: This is a 79y.o.-year-old male with history of brittle L7GK complicated by autonomic neuropathy and secondary adrenal insufficiency admitted with syncope. Plan:  Home regimen: Lantus 16 units q.h.s., Humalog KwikPen 7-7-7 units with each meal  Hydrocortisone 20 mg am and 15 mg pm dose    #Adrenal insufficiency  Change from IV to oral hydrocortisone dose of 30 mg every morning and 15 mg p.m. dose. Continue fludrocortisone. #T1DM  Inpatient regimen Lantus 10 units daily at bedtime(dose increased to 6 to 10 units today), Humalog 3 units 3 times daily with meals with correctional insulin. Given recent dose change, will continue on current insulin regimen. Monitor Accu-Cheks and adjust insulin regimen as needed. Consults    CC: Adrenal insufficiency consult    History of Present Illness     HPI: Nicholas Carmichael is a 79y.o. year old male with brittle Type 1 diabetes complicated by CAD and autonomic polyneuropathy, adrenal insufficiency, orthostatic hypotension, obesity, hyperlipidemia, atrial fibrillation, anxiety/depression, anemia, adrenal insufficiency brought to the ER following a syncopal episode at home. Patient was unable to recall what happened prior or during episode. Per chart review, he reportedly had a fall in the bathroom then shortly afterwards became unresponsive with blue lips and shallow breathing. Was hypotensive with systolics in the 54R per EMS. Patient appears back to his baseline on evaluation this morning. He denies any new complaints, tolerating meals. Reports has been compliant on his home hydrocortisone. Denies any acute febrile episodes, nausea, diarrhea. Review of Systems  Constitutional: Negative for appetite change. Respiratory: Negative for shortness of breath, wheezing, cough.   Cardiovascular: Negative for chest pain and palpitations. Gastrointestinal: Negative for abdominal pain, nausea and vomiting. Musculoskeletal: Negative for arthralgias. Neurological: Negative for dizziness, light-headedness and headaches. All other ROS reviewed and negative. Historical Information   Past Medical History:   Diagnosis Date    Adrenal insufficiency ([address])     Atrial fibrillation ([address])     Diabetes mellitus ([address])      History reviewed. No pertinent surgical history. Social History   Social History     Substance and Sexual Activity   Alcohol Use Not Currently     Social History     Substance and Sexual Activity   Drug Use Never     Social History     Tobacco Use   Smoking Status Former    Types: Cigarettes   Smokeless Tobacco Never     Family History: No family history on file.     Meds/Allergies   Current Facility-Administered Medications   Medication Dose Route Frequency Provider Last Rate Last Admin    apixaban (ELIQUIS) tablet 5 mg  5 mg Oral BID Ana Aldana MD   5 mg at 11/11/23 0827    aspirin chewable tablet 81 mg  81 mg Oral Daily Ana Aldana MD   81 mg at 11/11/23 0827    calcitriol (ROCALTROL) capsule 0.25 mcg  0.25 mcg Oral Daily Ana Aldana MD   0.25 mcg at 11/11/23 9349    cyanocobalamin (VITAMIN B-12) tablet 100 mcg  100 mcg Oral Daily Ana Aldana MD   100 mcg at 11/11/23 2556    ferrous sulfate tablet 325 mg  325 mg Oral Daily With Breakfast Ana Aldana MD   325 mg at 11/11/23 0826    fludrocortisone (FLORINEF) tablet 0.2 mg  0.2 mg Oral Daily Ana Aldana MD   0.2 mg at 11/11/23 0827    hydrocortisone (Solu-CORTEF) injection 50 mg  50 mg Intravenous [facility] Ana Aldana MD   50 mg at 11/11/23 7084    insulin glargine (LANTUS) subcutaneous injection 10 Units 0.1 mL  10 Units Subcutaneous LAKSHMI Aldana MD        insulin lispro (HumaLOG) 100 units/mL subcutaneous injection 1-5 Units  1-5 Units Subcutaneous LAKSHMI Aldana MD   2 Units at 11/10/23 2121    insulin lispro (HumaLOG) 100 units/mL subcutaneous injection 1-6 Units  1-6 Units Subcutaneous TID With Meals Selam Solis MD   2 Units at 11/11/23 0824    insulin lispro (HumaLOG) 100 units/mL subcutaneous injection 3 Units  3 Units Subcutaneous TID With Meals Selam Solis MD   3 Units at 11/11/23 0824    midodrine (PROAMATINE) tablet 15 mg  15 mg Oral TID [facility] Selam Solis MD   15 mg at 11/11/23 9391    risperiDONE (RisperDAL) tablet 1 mg  1 mg Oral BID Selam Solis MD   1 mg at 11/10/23 2242    sodium chloride tablet 1 g  1 g Oral TID With Meals Selam Solis MD   1 g at 11/11/23 0826     No Known Allergies    Objective   Vitals: Blood pressure 143/80, pulse 78, temperature 99.8 °F (37.7 °C), temperature source Oral, resp. rate 20, height 5' 4" (1.626 m), weight 68 kg (150 lb), SpO2 98 %. Intake/Output Summary (Last 24 hours) at 11/11/2023 0940  Last data filed at 11/11/2023 0715  Gross per 24 hour   Intake 550 ml   Output 300 ml   Net 250 ml     Invasive Devices       Peripheral Intravenous Line  Duration             Peripheral IV 11/10/23 Dorsal (posterior); Left Hand <1 day    Peripheral IV 11/10/23 Dorsal (posterior); Right Hand <1 day                    Physical Exam  Physical exam:   Constitutional:Oriented to person, place, and time  Appears well-developed and well-nourished. Not in any acute distress. HENT:   Head: Normocephalic and atraumatic. Neck: Normal range of motion. Pulmonary/Chest: Effort normal/ breathing comfortably on room air. CTAB   CVS:  Regular rate and rhythm, S1-S2 +  Abdomen: soft, nondistended, nontender  Musculoskeletal: Normal range of motion. Skin:  Warm, no rash  Extremities:  No pedal edema  Psychiatric: Normal mood and affect. Behavior is normal.     The history was obtained from the review of the chart, patient.     Lab Results:   Results from last 7 days   Lab Units 11/11/23  0651   HEMOGLOBIN A1C % 8.8*     Lab Results   Component Value Date    WBC 7.22 11/11/2023    HGB 12.5 11/11/2023    HCT 38.0 11/11/2023    MCV 95 11/11/2023     11/11/2023     Lab Results   Component Value Date/Time    BUN 19 11/11/2023 06:51 AM    K 3.8 11/11/2023 06:51 AM     11/11/2023 06:51 AM    CO2 29 11/11/2023 06:51 AM    CO2 31 11/10/2023 10:39 AM    CREATININE 1.04 11/11/2023 06:51 AM    AST 10 (L) 11/10/2023 04:53 PM    ALT 13 11/10/2023 04:53 PM    TP 5.8 (L) 11/10/2023 04:53 PM    ALB 3.6 11/10/2023 04:53 PM     No results for input(s): "CHOL", "HDL", "LDL", "TRIG", "VLDL" in the last 72 hours. No results found for: "Keven Maser", "[id number]"  POC Glucose (mg/dl)   Date Value   11/11/2023 202 (H)   11/10/2023 227 (H)       Imaging Studies: I have personally reviewed pertinent reports. Portions of the record may have been created with voice recognition software.

## 2023-11-11 NOTE — ASSESSMENT & PLAN NOTE
Per discussion with his daughter fell in the bathroom this morning  Later he was taken to a chair when he had a syncope with blue lips and shallow breaths and EMS was called  EMS noted SBP in the 70's  H/o orthostatic hypotension, adrenal insufficiency and follows with endocrine and cardiology as outpatient  BP had stabilized for some months on Midodrine 15 mg TID, Hydrocortisone 20 in qm/15 pm and Fludrocortisone 0.2 mg daily  BP on arrival was 122/64 but dropped to 71/43 at 1608  BP improved with IV hydrocortisone, IVF's, Midodrine  Ct stress dose hydrocortisone - taper from 11/11 if BP stable  Ct home Midodrine 15 mg TID  Endo/cardiology reassessment

## 2023-11-11 NOTE — ASSESSMENT & PLAN NOTE
Per discussion with his daughter fell in the bathroom this morning  Later he was taken to a chair when he had a syncope with blue lips and shallow breaths and EMS was called  EMS noted SBP in the 70's  H/o autonomic dysfunction, orthostatic hypotension, adrenal insufficiency and follows with endocrine and cardiology as outpatient  Outpatient regimen: Midodrine 15 mg TID, Hydrocortisone 20 in qm/15 pm and Fludrocortisone 0.2 mg daily  Orthostatic positive in the hospital  BP improved with IV hydrocortisone, IVF's, Midodrine  Appreciate cardiology and endocrinology recommendations  Continue midodrine 15 mg 3 times daily  Continue Florinef 0.2 mg daily  Continue NaCl 1 g 3 times daily  Transition to oral hydrocortisone 30 mg in the morning and 15 mg in the p.m.   Compression stockings and abdominal binder as ordered

## 2023-11-11 NOTE — ASSESSMENT & PLAN NOTE
Per discussion with his daughter fell in the bathroom this morning  Later he was taken to a chair when he developed

## 2023-11-11 NOTE — ASSESSMENT & PLAN NOTE
On hydrocortisone 20 mg in the morning and 15 mg in the evening and fludrocortisone 0.2 mg daily at home  Hydrocortisone increased in the setting of hypotension -received stress dose IV hydrocortisone    Plan:  Transition to oral hydrocortisone 30 mg in the morning and 15 mg in the evening  Continue with fludrocortisone 0.2 mg daily  Endocrinology following, appreciate recommendations

## 2023-11-11 NOTE — CONSULTS
Consultation - Dani Ayers 79 y.o. male MRN: [id number]    Unit/Bed#: [facility] 209-01 Encounter: [id number]      Assessment/Plan     Assessment: This is a 79y.o.-year-old male with history of brittle S5WZ complicated by autonomic neuropathy and secondary adrenal insufficiency admitted with syncope. Plan:  Home regimen: Lantus 16 units q.h.s., Humalog KwikPen 7-7-7 units with each meal  Hydrocortisone 20 mg am and 15 mg pm dose    #Adrenal insufficiency  Change from IV to oral hydrocortisone dose of 30 mg every morning and 15 mg p.m. dose. Continue fludrocortisone. #T1DM  Inpatient regimen Lantus 10 units daily at bedtime(dose increased to 6 to 10 units today), Humalog 3 units 3 times daily with meals with correctional insulin. Given recent dose change, will continue on current insulin regimen. Monitor Accu-Cheks and adjust insulin regimen as needed. Consults    CC: Adrenal insufficiency consult    History of Present Illness     HPI: Dani Ayers is a 79y.o. year old male with brittle Type 1 diabetes complicated by CAD and autonomic polyneuropathy, adrenal insufficiency, orthostatic hypotension, obesity, hyperlipidemia, atrial fibrillation, anxiety/depression, anemia, adrenal insufficiency brought to the ER following a syncopal episode at home. Patient was unable to recall what happened prior or during episode. Per chart review, he reportedly had a fall in the bathroom then shortly afterwards became unresponsive with blue lips and shallow breathing. Was hypotensive with systolics in the 28A per EMS. Patient appears back to his baseline on evaluation this morning. He denies any new complaints, tolerating meals. Reports has been compliant on his home hydrocortisone. Denies any acute febrile episodes, nausea, diarrhea. Review of Systems  Constitutional: Negative for appetite change. Respiratory: Negative for shortness of breath, wheezing, cough.   Cardiovascular: Negative for chest pain and palpitations. Gastrointestinal: Negative for abdominal pain, nausea and vomiting. Musculoskeletal: Negative for arthralgias. Neurological: Negative for dizziness, light-headedness and headaches. All other ROS reviewed and negative. Historical Information   Past Medical History:   Diagnosis Date    Adrenal insufficiency ([address])     Atrial fibrillation ([address])     Diabetes mellitus ([address])      History reviewed. No pertinent surgical history. Social History   Social History     Substance and Sexual Activity   Alcohol Use Not Currently     Social History     Substance and Sexual Activity   Drug Use Never     Social History     Tobacco Use   Smoking Status Former    Types: Cigarettes   Smokeless Tobacco Never     Family History: No family history on file.     Meds/Allergies   Current Facility-Administered Medications   Medication Dose Route Frequency Provider Last Rate Last Admin    apixaban (ELIQUIS) tablet 5 mg  5 mg Oral BID Chi Terrazas MD   5 mg at 11/11/23 0827    aspirin chewable tablet 81 mg  81 mg Oral Daily Chi Terrazas MD   81 mg at 11/11/23 0827    calcitriol (ROCALTROL) capsule 0.25 mcg  0.25 mcg Oral Daily Chi Terrazas MD   0.25 mcg at 11/11/23 4012    cyanocobalamin (VITAMIN B-12) tablet 100 mcg  100 mcg Oral Daily Chi Terrazas MD   100 mcg at 11/11/23 2774    ferrous sulfate tablet 325 mg  325 mg Oral Daily With Breakfast Chi Terrazas MD   325 mg at 11/11/23 0826    fludrocortisone (FLORINEF) tablet 0.2 mg  0.2 mg Oral Daily Chi Terrazas MD   0.2 mg at 11/11/23 0827    hydrocortisone (Solu-CORTEF) injection 50 mg  50 mg Intravenous [facility] Chi Terrazas MD   50 mg at 11/11/23 5732    insulin glargine (LANTUS) subcutaneous injection 10 Units 0.1 mL  10 Units Subcutaneous  Chi Terrazas MD        insulin lispro (HumaLOG) 100 units/mL subcutaneous injection 1-5 Units  1-5 Units Subcutaneous HS Chi Terrazas MD   2 Units at 11/10/23 2121    insulin lispro (HumaLOG) 100 units/mL subcutaneous injection 1-6 Units  1-6 Units Subcutaneous TID With Meals Corwin Saenz MD   2 Units at 11/11/23 0824    insulin lispro (HumaLOG) 100 units/mL subcutaneous injection 3 Units  3 Units Subcutaneous TID With Meals Corwin Saenz MD   3 Units at 11/11/23 0824    midodrine (PROAMATINE) tablet 15 mg  15 mg Oral TID [facility] Corwin Saenz MD   15 mg at 11/11/23 8363    risperiDONE (RisperDAL) tablet 1 mg  1 mg Oral BID Corwin Saenz MD   1 mg at 11/10/23 2242    sodium chloride tablet 1 g  1 g Oral TID With Meals Corwin Saenz MD   1 g at 11/11/23 0826     No Known Allergies    Objective   Vitals: Blood pressure 143/80, pulse 78, temperature 99.8 °F (37.7 °C), temperature source Oral, resp. rate 20, height 5' 4" (1.626 m), weight 68 kg (150 lb), SpO2 98 %. Intake/Output Summary (Last 24 hours) at 11/11/2023 0940  Last data filed at 11/11/2023 0715  Gross per 24 hour   Intake 550 ml   Output 300 ml   Net 250 ml     Invasive Devices       Peripheral Intravenous Line  Duration             Peripheral IV 11/10/23 Dorsal (posterior); Left Hand <1 day    Peripheral IV 11/10/23 Dorsal (posterior); Right Hand <1 day                    Physical Exam  Physical exam:   Constitutional:Oriented to person, place, and time  Appears well-developed and well-nourished. Not in any acute distress. HENT:   Head: Normocephalic and atraumatic. Neck: Normal range of motion. Pulmonary/Chest: Effort normal/ breathing comfortably on room air. CTAB   CVS:  Regular rate and rhythm, S1-S2 +  Abdomen: soft, nondistended, nontender  Musculoskeletal: Normal range of motion. Skin:  Warm, no rash  Extremities:  No pedal edema  Psychiatric: Normal mood and affect. Behavior is normal.     The history was obtained from the review of the chart, patient.     Lab Results:   Results from last 7 days   Lab Units 11/11/23  0651   HEMOGLOBIN A1C % 8.8*     Lab Results   Component Value Date    WBC 7.22 11/11/2023    HGB 12.5 11/11/2023    HCT 38.0 11/11/2023    MCV 95 11/11/2023     11/11/2023     Lab Results   Component Value Date/Time    BUN 19 11/11/2023 06:51 AM    K 3.8 11/11/2023 06:51 AM     11/11/2023 06:51 AM    CO2 29 11/11/2023 06:51 AM    CO2 31 11/10/2023 10:39 AM    CREATININE 1.04 11/11/2023 06:51 AM    AST 10 (L) 11/10/2023 04:53 PM    ALT 13 11/10/2023 04:53 PM    TP 5.8 (L) 11/10/2023 04:53 PM    ALB 3.6 11/10/2023 04:53 PM     No results for input(s): "CHOL", "HDL", "LDL", "TRIG", "VLDL" in the last 72 hours. No results found for: "Slaughter Ibanez", "[id number]"  POC Glucose (mg/dl)   Date Value   11/11/2023 202 (H)   11/10/2023 227 (H)       Imaging Studies: I have personally reviewed pertinent reports. Portions of the record may have been created with voice recognition software.

## 2023-11-12 LAB
ANION GAP SERPL CALCULATED.3IONS-SCNC: 9 MMOL/L
BASOPHILS # BLD AUTO: 0.02 THOUSANDS/ÂΜL (ref 0–0.1)
BASOPHILS NFR BLD AUTO: 0 % (ref 0–1)
BUN SERPL-MCNC: 17 MG/DL (ref 5–25)
CALCIUM SERPL-MCNC: 8.6 MG/DL (ref 8.4–10.2)
CHLORIDE SERPL-SCNC: 104 MMOL/L (ref 96–108)
CO2 SERPL-SCNC: 27 MMOL/L (ref 21–32)
CREAT SERPL-MCNC: 0.84 MG/DL (ref 0.6–1.3)
EOSINOPHIL # BLD AUTO: 0.06 THOUSAND/ÂΜL (ref 0–0.61)
EOSINOPHIL NFR BLD AUTO: 1 % (ref 0–6)
ERYTHROCYTE [DISTWIDTH] IN BLOOD BY AUTOMATED COUNT: 15.1 % (ref 11.6–15.1)
GFR SERPL CREATININE-BSD FRML MDRD: 91 ML/MIN/1.73SQ M
GLUCOSE SERPL-MCNC: 239 MG/DL (ref 65–140)
GLUCOSE SERPL-MCNC: 241 MG/DL (ref 65–140)
GLUCOSE SERPL-MCNC: 285 MG/DL (ref 65–140)
GLUCOSE SERPL-MCNC: 94 MG/DL (ref 65–140)
GLUCOSE SERPL-MCNC: 97 MG/DL (ref 65–140)
HCT VFR BLD AUTO: 36.6 % (ref 36.5–49.3)
HGB BLD-MCNC: 12.3 G/DL (ref 12–17)
IMM GRANULOCYTES # BLD AUTO: 0.03 THOUSAND/UL (ref 0–0.2)
IMM GRANULOCYTES NFR BLD AUTO: 0 % (ref 0–2)
LYMPHOCYTES # BLD AUTO: 1.84 THOUSANDS/ÂΜL (ref 0.6–4.47)
LYMPHOCYTES NFR BLD AUTO: 20 % (ref 14–44)
MCH RBC QN AUTO: 32.5 PG (ref 26.8–34.3)
MCHC RBC AUTO-ENTMCNC: 33.6 G/DL (ref 31.4–37.4)
MCV RBC AUTO: 97 FL (ref 82–98)
MONOCYTES # BLD AUTO: 1.15 THOUSAND/ÂΜL (ref 0.17–1.22)
MONOCYTES NFR BLD AUTO: 13 % (ref 4–12)
NEUTROPHILS # BLD AUTO: 5.9 THOUSANDS/ÂΜL (ref 1.85–7.62)
NEUTS SEG NFR BLD AUTO: 66 % (ref 43–75)
NRBC BLD AUTO-RTO: 0 /100 WBCS
PLATELET # BLD AUTO: 141 THOUSANDS/UL (ref 149–390)
PMV BLD AUTO: 11.2 FL (ref 8.9–12.7)
POTASSIUM SERPL-SCNC: 3.6 MMOL/L (ref 3.5–5.3)
RBC # BLD AUTO: 3.79 MILLION/UL (ref 3.88–5.62)
SODIUM SERPL-SCNC: 140 MMOL/L (ref 135–147)
WBC # BLD AUTO: 9 THOUSAND/UL (ref 4.31–10.16)

## 2023-11-12 PROCEDURE — 99232 SBSQ HOSP IP/OBS MODERATE 35: CPT | Performed by: INTERNAL MEDICINE

## 2023-11-12 PROCEDURE — 97167 OT EVAL HIGH COMPLEX 60 MIN: CPT

## 2023-11-12 PROCEDURE — 97163 PT EVAL HIGH COMPLEX 45 MIN: CPT

## 2023-11-12 PROCEDURE — 82948 REAGENT STRIP/BLOOD GLUCOSE: CPT

## 2023-11-12 PROCEDURE — 80048 BASIC METABOLIC PNL TOTAL CA: CPT | Performed by: STUDENT IN AN ORGANIZED HEALTH CARE EDUCATION/TRAINING PROGRAM

## 2023-11-12 PROCEDURE — 85025 COMPLETE CBC W/AUTO DIFF WBC: CPT | Performed by: STUDENT IN AN ORGANIZED HEALTH CARE EDUCATION/TRAINING PROGRAM

## 2023-11-12 RX ORDER — INSULIN LISPRO 100 [IU]/ML
7 INJECTION, SOLUTION INTRAVENOUS; SUBCUTANEOUS
Status: DISCONTINUED | OUTPATIENT
Start: 2023-11-12 | End: 2023-11-14 | Stop reason: HOSPADM

## 2023-11-12 RX ORDER — INSULIN GLARGINE 100 [IU]/ML
15 INJECTION, SOLUTION SUBCUTANEOUS
Status: DISCONTINUED | OUTPATIENT
Start: 2023-11-12 | End: 2023-11-14 | Stop reason: HOSPADM

## 2023-11-12 RX ORDER — INSULIN LISPRO 100 [IU]/ML
5 INJECTION, SOLUTION INTRAVENOUS; SUBCUTANEOUS
Status: DISCONTINUED | OUTPATIENT
Start: 2023-11-12 | End: 2023-11-12

## 2023-11-12 RX ADMIN — INSULIN LISPRO 3 UNITS: 100 INJECTION, SOLUTION INTRAVENOUS; SUBCUTANEOUS at 08:35

## 2023-11-12 RX ADMIN — VITAM B12 100 MCG: 100 TAB at 08:30

## 2023-11-12 RX ADMIN — INSULIN LISPRO 3 UNITS: 100 INJECTION, SOLUTION INTRAVENOUS; SUBCUTANEOUS at 08:36

## 2023-11-12 RX ADMIN — SODIUM CHLORIDE 1 G: 1 TABLET ORAL at 12:05

## 2023-11-12 RX ADMIN — MIDODRINE HYDROCHLORIDE 15 MG: 5 TABLET ORAL at 16:36

## 2023-11-12 RX ADMIN — RISPERIDONE 1 MG: 1 TABLET ORAL at 17:34

## 2023-11-12 RX ADMIN — MIDODRINE HYDROCHLORIDE 15 MG: 5 TABLET ORAL at 06:06

## 2023-11-12 RX ADMIN — INSULIN GLARGINE 15 UNITS: 100 INJECTION, SOLUTION SUBCUTANEOUS at 22:38

## 2023-11-12 RX ADMIN — CALCITRIOL 0.25 MCG: 0.25 CAPSULE, LIQUID FILLED ORAL at 08:30

## 2023-11-12 RX ADMIN — INSULIN LISPRO 4 UNITS: 100 INJECTION, SOLUTION INTRAVENOUS; SUBCUTANEOUS at 12:10

## 2023-11-12 RX ADMIN — SODIUM CHLORIDE 1 G: 1 TABLET ORAL at 16:36

## 2023-11-12 RX ADMIN — FLUDROCORTISONE ACETATE 0.2 MG: 0.1 TABLET ORAL at 08:31

## 2023-11-12 RX ADMIN — HYDROCORTISONE 30 MG: 20 TABLET ORAL at 08:33

## 2023-11-12 RX ADMIN — INSULIN LISPRO 7 UNITS: 100 INJECTION, SOLUTION INTRAVENOUS; SUBCUTANEOUS at 17:35

## 2023-11-12 RX ADMIN — APIXABAN 5 MG: 5 TABLET, FILM COATED ORAL at 17:34

## 2023-11-12 RX ADMIN — SODIUM CHLORIDE 1 G: 1 TABLET ORAL at 08:30

## 2023-11-12 RX ADMIN — HYDROCORTISONE 15 MG: 10 TABLET ORAL at 16:35

## 2023-11-12 RX ADMIN — INSULIN LISPRO 5 UNITS: 100 INJECTION, SOLUTION INTRAVENOUS; SUBCUTANEOUS at 12:11

## 2023-11-12 RX ADMIN — MIDODRINE HYDROCHLORIDE 15 MG: 5 TABLET ORAL at 12:05

## 2023-11-12 RX ADMIN — ASPIRIN 81 MG CHEWABLE TABLET 81 MG: 81 TABLET CHEWABLE at 08:30

## 2023-11-12 RX ADMIN — RISPERIDONE 1 MG: 1 TABLET ORAL at 08:30

## 2023-11-12 RX ADMIN — FERROUS SULFATE TAB 325 MG (65 MG ELEMENTAL FE) 325 MG: 325 (65 FE) TAB at 08:30

## 2023-11-12 RX ADMIN — APIXABAN 5 MG: 5 TABLET, FILM COATED ORAL at 08:30

## 2023-11-12 NOTE — ASSESSMENT & PLAN NOTE
Lab Results   Component Value Date    HGBA1C 8.8 (H) 11/11/2023       Recent Labs     11/11/23  1745 11/11/23  2103 11/12/23  0728 11/12/23  1209   POCGLU 247* 257* 239* 285*         Blood Sugar Average: Last 72 hrs:  (P) [id number]  On Toujeo 20 at bedtime, lispro 6 - 7 - 7 at home    Diabetes mellitus type 1  Uncontrolled  A1c 8.8 continue Lantus, Humalog with meals  Insulin dosing per endocrinology  Avoid hypoglycemia  Hypoglycemia protocol in place

## 2023-11-12 NOTE — ASSESSMENT & PLAN NOTE
Patient presents with fall in the bathroom  Patient with syncope, noted to be hypotensive with SBP in the 70s  Patient with history of normal dysfunction adrenal insufficiency orthostatic hypotension  Received IV hydrocortisone IV fluids  Now on hydrocortisone 30 mg a.m., 15 mg p.m. with plans to transition to home regimen tomorrow per endocrinology  Continue salt tablets  Continue midodrine  Compression stockings, abdominal binder  Monitor blood pressures

## 2023-11-12 NOTE — PLAN OF CARE
Problem: PHYSICAL THERAPY ADULT  Goal: Performs mobility at highest level of function for planned discharge setting. See evaluation for individualized goals. Description: Treatment/Interventions: ADL retraining, Functional transfer training, LE strengthening/ROM, Elevations, Therapeutic exercise, Endurance training, Cognitive reorientation, Patient/family training, Equipment eval/education, Bed mobility, Gait training, Compensatory technique education, Continued evaluation, Spoke to nursing, Spoke to MD, OT  Equipment Recommended: Sachin Akins       See flowsheet documentation for full assessment, interventions and recommendations. Note: Prognosis: Fair  Problem List: Decreased strength, Decreased endurance, Impaired balance, Decreased mobility, Impaired judgement, Decreased cognition, Decreased safety awareness  Assessment: Pt is 79 y.o. male seen for PT evaluation s/p admit to [address] [place] [address] on 11/10/2023. Pt presenting s/p syncopal episode/ fall in bathroom. Pt dx w/ R 8;9 rib fxs + orthostatic hypotension  Comorbidities affecting pt's physical performance at time of assessment listed above   Due to acute medical issues,  (+) orthostatic hypotension; ongoing medical workup/ management for primary dx; pain, fall risk, significant falls hx;  increased reliance on more restrictive AD compared to baseline;  decreased activity tolerance compared to baseline, increased assistance needed from caregiver at current time, continuous telemetry monitoring, multiple lines, decline in overall functional mobility status; health management issues; note unstable clinical picture (high complexity)   Prior to admission, pt reports living w/ friend Beauty Ala) in a [address]; 0 drive; I w/ ADL's friend A w/ IADL's pt reports hx of 4-5 recent falls. Daughter checks in daily.  Currently pt  is requiring Mandy A for bed skills; Mandy for functional transfers and Mandy for ambulation w/ RW < 3' 2* (+) orthostatic hypotension and termination of gait by PTR 2* same. BP's as follows supine: 155/76; sitting 99/56  standin/53  supine: 131/72  - pt was asymptomatic throughout. Pt presents functioning below baseline and currently w/ overall mobility deficits 2* to: decreased LE strength/AROM; limited flexibility;  generalized weakness/ deconditioning; decreased endurance; decreased activity tolerance;  impaired balance; gait deviations; decreased safety awareness; SOB/BELLAMY; fatigue; impaired safety and judgement; limited insight into current deficits; bed/ chair alarms; multiple lines; (+) orthostatics-   Pt currently at risk for ongoing falls. (Please find additional objective findings from PT assessment regarding body systems outlined above.) Pt will continue to benefit from skilled PT interventions to address stated impairments; to maximize functional potential; for ongoing pt/ family training; and DME needs. PT is currently recommending level II PT resource intensity. Barriers to Discharge Comments: orthostatic hypotension  Rehab Resource Intensity Level, PT: II (Moderate Resource Intensity)    See flowsheet documentation for full assessment.

## 2023-11-12 NOTE — ASSESSMENT & PLAN NOTE
Presents after a fall and was a trauma alert  Trauma inputs noted  Imaging studies noted  Fall precautions  Safe ambulation  Physical therapy

## 2023-11-12 NOTE — PROGRESS NOTES
Progress Note - Areli Hartley 79 y.o. male MRN: [id number]    Unit/Bed#: [facility] 825-01 Encounter: [id number]      CC: diabetes f/u    Subjective:   Areli Hartley is a 79y.o. year old male with brittle S0HN complicated by autonomic neuropathy and secondary adrenal insufficiency admitted with syncope. Feels better today. Has no new complaints. Glucose running above goal.      Objective:     Vitals: Blood pressure 99/58, pulse 77, temperature 97.5 °F (36.4 °C), resp. rate 16, height 5' 4" (1.626 m), weight 68 kg (150 lb), SpO2 98 %. ,Body mass index is 25.75 kg/m². Intake/Output Summary (Last 24 hours) at 11/12/2023 7818  Last data filed at 11/11/2023 1829  Gross per 24 hour   Intake 1020 ml   Output 550 ml   Net 470 ml       Physical Exam:  General Appearance: awake, appears stated age and cooperative  Head: Normocephalic, without obvious abnormality, atraumatic  Extremities: moves all extremities  Skin: Skin color and temperature normal.   Pulm: no labored breathing    Lab, Imaging and other studies: I have personally reviewed pertinent reports. POC Glucose (mg/dl)   Date Value   11/12/2023 239 (H)   11/11/2023 257 (H)   11/11/2023 247 (H)   11/11/2023 292 (H)   11/11/2023 310 (H)   11/11/2023 202 (H)   11/10/2023 227 (H)   11/10/2023 182 (H)   11/10/2023 67   11/10/2023 118       Assessment and plan:  #Adrenal insufficiency  Continue on hydrocortisone 30 mg every morning and 15 mg every afternoon for today with plan to resume on home regimen likely from tomorrow. #T1DM  Home regimen: Lantus 16 units q.h.s., Humalog KwikPen 7-7-7 units with each meal   Inpatient regimen Lantus 10 units daily at bedtime, Humalog 3 units 3 times daily with meals with correctional insulin. Recommendations:  increase to home regimen of Padsnr02 units daily at bedtime, Humalog to 7 units 3 times daily with meals with correctional insulin. Portions of the record may have been created with voice recognition software.

## 2023-11-12 NOTE — OCCUPATIONAL THERAPY NOTE
Occupational Therapy Evaluation     Patient Name: Dayanara Markham  Today's Date: 11/12/2023  Problem List  Principal Problem:    Hypotension/syncope  Active Problems:    Fall    Adrenal insufficiency (HCC)    Orthostatic hypotension    Type 1 diabetes mellitus with hypoglycemia (HCC)    History of CVA (cerebrovascular accident)    Paroxysmal atrial fibrillation [facility])    Past Medical History  Past Medical History:   Diagnosis Date    Adrenal insufficiency ([address])     Atrial fibrillation ([address])     Diabetes mellitus ([address])      Past Surgical History  History reviewed. No pertinent surgical history. 11/12/23 0959   OT Last Visit   OT Visit Date 11/12/23   Note Type   Note type Evaluation   Pain Assessment   Pain Assessment Tool 0-10   Pain Score 10 - Worst Possible Pain   Pain Location/Orientation Orientation: Left; Location: Rib Cage   Hospital Pain Intervention(s) Repositioned; Ambulation/increased activity   Restrictions/Precautions   Weight Bearing Precautions Per Order No   Other Precautions Cognitive; Chair Alarm; Bed Alarm;Multiple lines; Fall Risk;Pain  (+ OH)   Home Living   Type of [address] [place] [address] Dr One level  (5STE)   Bathroom Shower/Tub Walk-in shower   Bathroom Toilet Standard   Bathroom Equipment Shower chair   Home Equipment Walker;Cane  (reports using RW PTA)   Prior Function   Level of [place] Independent with ADLs; Needs assistance with IADLS   Lives With Friend(s)  ('friend' Karyle Crochet)   Receives Help From Family;Friend(s)   IADLs Family/Friend/Other provides transportation; Family/Friend/Other provides meals   Falls in the last 6 months (S)  5 to 10   Vocational Retired   Lifestyle   Autonomy PTA, pt reports being I with ADLs, MI fnxl mobility with RW, friend A with IADLs, (-)    Reciprocal Relationships Friend 'Karyle Crochet' home and able to assist as needed, dtr checks in daily, local son   Service to Others Retired   Intrinsic Gratification TV   Subjective   Subjective "I feel good"   ADL Where Assessed Edge of bed   Eating Assistance [facility] 5  Supervision/Setup   LB Dressing Assistance 4  Minimal [address] [place]  4  Minimal Assistance   Bed Mobility   Supine to Sit 4  Minimal assistance   Additional items Assist x 1;HOB elevated; Bedrails; Increased time required   Sit to Supine 4  Minimal assistance   Additional items Assist x 1;HOB elevated; Bedrails; Increased time required   Transfers   Sit to Stand 4  Minimal assistance   Additional items Assist x 1   Stand to Sit 4  Minimal assistance   Additional items Assist x 1   Additional Comments (S)  BP laying - 155/76, sitting EOB - 99/56, standing - 70/53, return to supine - 131/72. Pt with no c/o lightheadness/dizziness, does look pale once standing   Functional Mobility   Functional Mobility 4  Minimal assistance   Additional Comments pt takes 1-2 steps towards [facility]   Balance   Static Sitting Fair +   Dynamic Sitting Fair   Static Standing Fair -   Dynamic Standing Fair -   Ambulatory Poor +   Activity Tolerance   Activity Tolerance Patient limited by fatigue  (+ OH)   Medical Staff Made Aware PT Yee   Nurse Made Aware AMBROSIO Arguelles   RUE Assessment   RUE Assessment WFL   LUE Assessment   LUE Assessment WFL   Cognition   Overall Cognitive Status Impaired   Arousal/Participation Responsive; Cooperative   Attention Attends with cues to redirect   Orientation Level Oriented to person;Oriented to place  (oriented to month, states year is 2003)   Memory Unable to assess   Following Commands Follows one step commands with increased time or repetition   Comments Pt presents with flat affect, cooperative t/o session   Assessment   Limitation Decreased ADL status; Decreased UE strength;Decreased Safe judgement during ADL;Decreased cognition;Decreased endurance;Decreased self-care trans;Decreased high-level ADLs   Prognosis Fair   Assessment Pt is a 79 y.o. male admitted to [facility] on 11/10/2023 w/ Hypotension, syncope and fall.  has a past medical history of Adrenal insufficiency, Atrial fibrillation, CVA, and Diabetes mellitus. Pt with active OT orders and up and OOB as tolerated orders. Pt seen as a co-evaluation with PT due to the patient's co-morbidities, clinically unstable presentation/clinical complexity, and present impairments. As per pt report, pta, resides with his friend in a 1STH, 5STE. Pt was I w/  ADLS and A for IADLS, (-) drove. Upon evaluation, pt currently requires MIN A for transfers and mobility. Pt currently requires I eating, I grooming, S UB ADLs, MIN A LB ADLs, and MIN A toileting. Pt is limited at this time 2*: endurance, activity tolerance, functional mobility, balance, functional standing tolerance, decreased I w/ ADLS/IADLS, strength, cognitive impairments, and decreased safety awareness. The following Occupational Performance Areas to address include: bathing/shower, toilet hygiene, dressing, health maintenance, functional mobility, community mobility, and clothing management. Pt to benefit from immediate acute skilled OT to address above deficits, improve overall functional independence, maximize fnxl mobility and reduce caregiver burden. From OT standpoint, recommendation at time of d/c would be home with skilled therapy - pending progress. Pt was left after session with all current needs met. The patient's raw score on the AM-PAC Daily Activity Inpatient Short Form is 20. A raw score of greater than or equal to 19 suggests the patient may benefit from discharge to home. Please refer to the recommendation of the Occupational Therapist for safe discharge planning. Goals   Patient Goals none stated   LTG Time Frame 10-14   Plan   Treatment Interventions ADL retraining;Functional transfer training; Endurance training;UE strengthening/ROM; Cognitive reorientation;Patient/family training;Equipment evaluation/education; Compensatory technique education;Continued evaluation; Energy conservation; Activityengagement   Goal Expiration Date 11/26/23   OT Frequency 2-3x/wk   Discharge Recommendation   Rehab Resource Intensity Level, OT III (Minimum Resource Intensity)   AM-PAC Daily Activity Inpatient   Lower Body Dressing 3   Bathing 3   Toileting 3   Upper Body Dressing 3   Grooming 4   Eating 4   Daily Activity Raw Score 20   Daily Activity Standardized Score (Calc for Raw Score >=11) 42.03   AM-PAC Applied Cognition Inpatient   Following a Speech/Presentation 3   Understanding Ordinary Conversation 4   Taking Medications 4   Remembering Where Things Are Placed or Put Away 3   Remembering List of 4-5 Errands 3   Taking Care of Complicated Tasks 2   Applied Cognition Raw Score 19   Applied Cognition Standardized Score 39.77     GOALS    - Pt will complete UB dressing/self care/bathing w/ mod I using adaptive device and DME as needed    - Pt will complete LB dressing/self care/bathing w/ mod I using adaptive device and DME as needed    - Pt will complete toileting w/ mod I w/ G hygiene/thoroughness using DME as needed    - Pt will improve functional transfers to Mod I on/off all surfaces using DME as needed w/ G balance/safety     - Pt will improve functional mobility during ADL/IADL/leisure tasks to Mod I using DME as needed w/ G balance/safety     - Pt will demonstrate G carryover of pt/caregiver education and training as appropriate.     - Pt will demonstrate 100% carryover of energy conservation techniques t/o functional I/ADL/leisure tasks w/o cues s/p skilled education    - Pt will engage in ongoing cognitive assessment w/ G participation to assist w/ safe d/c planning/recommendations    - Pt will increase static and dynamic standing/sitting balance to F+  using AD/DME as needed to increase safety and I during fnxl transfers and ADLs    - Pt will maintain upright sitting position for at least 20 min during dynamic fnxl activity with F+  balance and endurance to improve ADL performance and independence, as well as reduce risk of falls       Olegario Mcintyre MS, OTR/L

## 2023-11-12 NOTE — ASSESSMENT & PLAN NOTE
Home regimen hydrocortisone 20 mg in the morning, 15 mg in the evening and fludrocortisone 0.2 mg daily  Received IV hydrocortisone now on hydrocortisone 30 mg a.m., 15 mg evening  Endocrinology following, inputs noted with plans to transition to home regimen from tomorrow  Adequate hydration encouraged

## 2023-11-12 NOTE — PLAN OF CARE
Problem: OCCUPATIONAL THERAPY ADULT  Goal: Performs self-care activities at highest level of function for planned discharge setting. See evaluation for individualized goals. Description: Treatment Interventions: ADL retraining, Functional transfer training, Endurance training, UE strengthening/ROM, Cognitive reorientation, Patient/family training, Equipment evaluation/education, Compensatory technique education, Continued evaluation, Energy conservation, Activityengagement          See flowsheet documentation for full assessment, interventions and recommendations. Note: Limitation: Decreased ADL status, Decreased UE strength, Decreased Safe judgement during ADL, Decreased cognition, Decreased endurance, Decreased self-care trans, Decreased high-level ADLs  Prognosis: Fair  Assessment: Pt is a 79 y.o. male admitted to [facility] on 11/10/2023 w/ Hypotension, syncope and fall.  has a past medical history of Adrenal insufficiency, Atrial fibrillation, CVA, and Diabetes mellitus. Pt with active OT orders and up and OOB as tolerated orders. Pt seen as a co-evaluation with PT due to the patient's co-morbidities, clinically unstable presentation/clinical complexity, and present impairments. As per pt report, pta, resides with his friend in a 1STH, 5STE. Pt was I w/  ADLS and A for IADLS, (-) drove. Upon evaluation, pt currently requires MIN A for transfers and mobility. Pt currently requires I eating, I grooming, S UB ADLs, MIN A LB ADLs, and MIN A toileting. Pt is limited at this time 2*: endurance, activity tolerance, functional mobility, balance, functional standing tolerance, decreased I w/ ADLS/IADLS, strength, cognitive impairments, and decreased safety awareness. The following Occupational Performance Areas to address include: bathing/shower, toilet hygiene, dressing, health maintenance, functional mobility, community mobility, and clothing management.  Pt to benefit from immediate acute skilled OT to address above deficits, improve overall functional independence, maximize fnxl mobility and reduce caregiver burden. From OT standpoint, recommendation at time of d/c would be home with skilled therapy - pending progress. Pt was left after session with all current needs met. The patient's raw score on the -PAC Daily Activity Inpatient Short Form is 20. A raw score of greater than or equal to 19 suggests the patient may benefit from discharge to home. Please refer to the recommendation of the Occupational Therapist for safe discharge planning.      Rehab Resource Intensity Level, OT: III (Minimum Resource Intensity)

## 2023-11-12 NOTE — PHYSICAL THERAPY NOTE
PHYSICAL THERAPY EVALUATION  NAME:  Debbei Bird  DATE: 11/12/23    AGE:   79 y.o. Mrn:   [id number]  ADMIT DX:  Adrenal insufficiency ([address]) [E27.40]  Multiple injuries [T07. XXXA]  Hypotension [I95.9]    Past Medical History:   Diagnosis Date    Adrenal insufficiency ([address])     Atrial fibrillation ([address])     Diabetes mellitus ([address])      History reviewed. No pertinent surgical history. Length Of Stay: 2  PHYSICAL THERAPY EVALUATION :    11/12/23 1009   PT Last Visit   PT Visit Date 11/12/23   Note Type   Note type Evaluation   Pain Assessment   Pain Assessment Tool 0-10   Pain Score 9   Pain Location/Orientation Orientation: Left; Location: Rib Cage   Hospital Pain Intervention(s) MD notified (Comment); Ambulation/increased activity   Restrictions/Precautions   Weight Bearing Precautions Per Order No   Other Precautions Impulsive;Cognitive; Chair Alarm; Bed Alarm;Telemetry;Multiple lines; Fall Risk;Pain  (ORTHOSTATIC HYPOTENSION)   Home Living   Type of [address] [place] [address] Dr One level  (5 RYAN)   Bathroom Shower/Tub Walk-in shower   Bathroom Toilet Standard   Bathroom Equipment Shower chair   Home Equipment Walker;Cane   Additional Comments Prior to admission, pt reports living w/ friend Cabrera Jeffries) in a [address] + 5 [address]YAN; 0 drive; I w/ ADL's friend A w/ IADL's pt reports hx of 4-5 recent falls. Daughter checks in daily. Prior Function   Level of [place] Independent with ADLs; Needs assistance with IADLS   Lives With Friend(s)  ('friend' Chiquita Mcgill)   Receives Help From Family;Friend(s)   IADLs Family/Friend/Other provides transportation; Family/Friend/Other provides meals   Falls in the last 6 months 5 to 10   Vocational Retired   [employer]   Overall Cognitive Status Impaired   Attention Attends with cues to redirect   Orientation Level Oriented to person;Oriented to place  (oriented to month, states year is 2003)   Memory Unable to assess   Following Commands Follows one step commands with increased time or repetition Comments FLAT AFFECT; OVERALL PLEASANT   RUE Assessment   RUE Assessment WFL   LUE Assessment   LUE Assessment WFL   RLE Assessment   RLE Assessment WFL   LLE Assessment   LLE Assessment WFL   Coordination   Movements are Fluid and Coordinated 1   Sensation WFL   Light Touch   RLE Light Touch Grossly intact   LLE Light Touch Grossly intact   Sharp/Dull   RLE Sharp/Dull Grossly intact   LLE Sharp/Dull Grossly intact   Proprioception   RLE Proprioception Grossly intact   LLE Proprioception Grossly Intact   Bed Mobility   Supine to Sit 4  Minimal assistance   Additional items Assist x 1   Sit to Supine 4  Minimal assistance   Additional items Assist x 1   Transfers   Sit to Stand 4  Minimal assistance   Additional items Assist x 1; Increased time required;Verbal cues   Stand to Sit 4  Minimal assistance   Additional items Assist x 1; Increased time required;Verbal cues   Additional Comments (S)  (+) orthostatic hypotension and termination of gait by PTR 2* same. BP's as follows supine: 155/76; sitting 99/56  standin/53  supine: 131/72   Ambulation/Elevation   Gait pattern Forward Flexion; Wide RENÉ   Gait Assistance 4  Minimal assist   Assistive Device None  (HHA)   Distance 3-4 steps to [facility] w/ HHA/ Mandy- furtrher distnaces limited by asymptomatic orthostatic hypotension   Balance   Static Sitting Fair +   Dynamic Sitting Fair   Static Standing Fair -   Dynamic Standing Fair -   Ambulatory Poor +   Endurance Deficit   Endurance Deficit Yes   Activity Tolerance   Activity Tolerance Patient limited by fatigue;Treatment limited secondary to medical complications (Comment)  (orthostatic hypotension)   Medical Staff Made Aware OT/   Nurse Made Aware RN Blane   Assessment   Prognosis Fair   Problem List Decreased strength;Decreased endurance; Impaired balance;Decreased mobility; Impaired judgement;Decreased cognition;Decreased safety awareness   Assessment Pt is 79 y.o. male seen for PT evaluation s/p admit to [facility] on 11/10/2023. Pt presenting s/p syncopal episode/ fall in bathroom. Pt dx w/ R 8;9 rib fxs + orthostatic hypotension  Comorbidities affecting pt's physical performance at time of assessment listed above   Due to acute medical issues,  (+) orthostatic hypotension; ongoing medical workup/ management for primary dx; pain, fall risk, significant falls hx;  increased reliance on more restrictive AD compared to baseline;  decreased activity tolerance compared to baseline, increased assistance needed from caregiver at current time, continuous telemetry monitoring, multiple lines, decline in overall functional mobility status; health management issues; note unstable clinical picture (high complexity)   Prior to admission, pt reports living w/ friend Issa Hernandez) in a [address] + [address]; 0 drive; I w/ ADL's friend A w/ IADL's pt reports hx of 4-5 recent falls. Daughter checks in daily. Currently pt  is requiring Mandy A for bed skills; Mandy for functional transfers and Mandy for ambulation w/ RW < 3' 2* (+) orthostatic hypotension and termination of gait by PTR 2* same. BP's as follows supine: 155/76; sitting 99/56  standin/53  supine: 131/72  - pt was asymptomatic throughout. Pt presents functioning below baseline and currently w/ overall mobility deficits 2* to: decreased LE strength/AROM; limited flexibility;  generalized weakness/ deconditioning; decreased endurance; decreased activity tolerance;  impaired balance; gait deviations; decreased safety awareness; SOB/BELLAMY; fatigue; impaired safety and judgement; limited insight into current deficits; bed/ chair alarms; multiple lines; (+) orthostatics-   Pt currently at risk for ongoing falls. (Please find additional objective findings from PT assessment regarding body systems outlined above.) Pt will continue to benefit from skilled PT interventions to address stated impairments; to maximize functional potential; for ongoing pt/ family training; and DME needs.  PT is currently recommending level II PT resource intensity. Barriers to Discharge Comments orthostatic hypotension   Goals   Patient Goals none stated   STG Expiration Date 11/26/23   Short Term Goal #1 In 14 days pt will complete: 1) Bed mobility skills with MI to facilitate safe return to previous living environment and decrease burden on caregivers. 2) Functional transfers with MI  to facilitate safe return to previous living environment  3) Ambulation with least restrictive AD MI > 50'x2' without LOB and stable vitals for safe ambulation in home/ community environment. 4) Stair training up/ down 5 step/s with S + rail/s  for safe access to previous living environment and to increase community access. 5) Improve balance by 1 grade in order to decrease fall risk. 6) Improve LE strength grades by 1 to increase independence w/ all functional mobility, transfers and gait. 7) PT for ongoing pt and family education; DME needs and D/C planning to promote highest level of function in least restrictive environment. PT Treatment Day 0   Plan   Treatment/Interventions ADL retraining;Functional transfer training;LE strengthening/ROM; Elevations; Therapeutic exercise; Endurance training;Cognitive reorientation;Patient/family training;Equipment eval/education; Bed mobility;Gait training; Compensatory technique education;Continued evaluation;Spoke to nursing;Spoke to MD;OT   PT Frequency 3-5x/wk   Discharge Recommendation   Rehab Resource Intensity Level, PT II (Moderate Resource Intensity)   Equipment Recommended Walker   AM-PAC Basic Mobility Inpatient   Turning in Flat Bed Without Bedrails 3   Lying on Back to Sitting on Edge of Flat Bed Without Bedrails 3   Moving Bed to Chair 3   Standing Up From Chair Using Arms 3   Walk in Room 1   Climb 3-5 Stairs With Railing 1   Basic Mobility Inpatient Raw Score 14   Basic Mobility Standardized Score 35.55   Highest Level Of Mobility   -[facility] Goal 4: Move to chair/commode   -[facility] Achieved 5: Stand (1 or more minutes)   End of Consult   Patient Position at End of Consult Bed/Chair alarm activated; All needs within reach       The patient's AM-PAC Basic Mobility Inpatient Short Form Raw Score is 14. A Raw score of less than or equal to 16 suggests the patient may benefit from discharge to post-acute rehabilitation services. Please also refer to the recommendation of the Physical Therapist for safe discharge planning.

## 2023-11-12 NOTE — PROGRESS NOTES
[address]  Progress Note  Name: Luzmaria Giron I  MRN: [id number]  Unit/Bed#: [facility]PHP 825-01 I Date of Admission: 11/10/2023   Date of Service: 11/12/2023 I Hospital Day: 2    Assessment/Plan   * Hypotension/syncope  Assessment & Plan  Patient presents with fall in the bathroom  Patient with syncope, noted to be hypotensive with SBP in the 70s  Patient with history of normal dysfunction adrenal insufficiency orthostatic hypotension  Received IV hydrocortisone IV fluids  Now on hydrocortisone 30 mg a.m., 15 mg p.m. with plans to transition to home regimen tomorrow per endocrinology  Continue salt tablets  Continue midodrine  Compression stockings, abdominal binder  Monitor blood pressures    Adrenal insufficiency (HCC)  Assessment & Plan  Home regimen hydrocortisone 20 mg in the morning, 15 mg in the evening and fludrocortisone 0.2 mg daily  Received IV hydrocortisone now on hydrocortisone 30 mg a.m., 15 mg evening  Endocrinology following, inputs noted with plans to transition to home regimen from tomorrow  Adequate hydration encouraged      Orthostatic hypotension  Assessment & Plan  History of orthostatic hypotension likely multifactorial with history of anomic dysfunction and adrenal insufficiency  Continue midodrine 15 g 3 times daily  Salt tablets  Adequate hydration  Compression stockings abdominal binder when out of bed      Type 1 diabetes mellitus with hypoglycemia [facility])  Assessment & Plan  Lab Results   Component Value Date    HGBA1C 8.8 (H) 11/11/2023       Recent Labs     11/11/23  1745 11/11/23  2103 11/12/23  0728 11/12/23  1209   POCGLU 247* 257* 239* 285*         Blood Sugar Average: Last 72 hrs:  (P) [id number]  On Toujeo 20 at bedtime, lispro 6 - 7 - 7 at home    Diabetes mellitus type 1  Uncontrolled  A1c 8.8 continue Lantus, Humalog with meals  Insulin dosing per endocrinology  Avoid hypoglycemia  Hypoglycemia protocol in place      Paroxysmal atrial fibrillation [facility])  Assessment & Plan  Rate controlled  Eliquis for anticoagulation    History of CVA (cerebrovascular accident)  Assessment & Plan  Continue aspirin, Eliquis    Fall  Assessment & Plan  Presents after a fall and was a trauma alert  Trauma inputs noted  Imaging studies noted  Fall precautions  Safe ambulation  Physical therapy                 VTE Pharmacologic Prophylaxis: VTE Score: 3 Moderate Risk (Score 3-4) - Pharmacological DVT Prophylaxis Ordered: apixaban (Eliquis). Patient Centered Rounds: I performed bedside rounds with nursing staff today. Discussions with Specialists or Other Care Team Provider:     Education and Discussions with Family / Patient:  Patient, updated daughter Rufina Plain questions answered. Total Time Spent on Date of Encounter in care of patient: 31 mins. This time was spent on one or more of the following: performing physical exam; counseling and coordination of care; obtaining or reviewing history; documenting in the medical record; reviewing/ordering tests, medications or procedures; communicating with other healthcare professionals and discussing with patient's family/caregivers. Current Length of Stay: 2 day(s)  Current Patient Status: Inpatient   Certification Statement: The patient will continue to require additional inpatient hospital stay due to as outlined  Discharge Plan:  Awaiting clinical symptomatic improvement, physical therapy, case management following for disposition planning    Code Status: Level 1 - Full Code    Subjective:     Comfortably in bed  Reports feeling okay  History chart labs medications reviewed    Objective:     Vitals:   Temp (24hrs), Av.6 °F (36.4 °C), Min:97.4 °F (36.3 °C), Max:98.3 °F (36.8 °C)    Temp:  [97.4 °F (36.3 °C)-98.3 °F (36.8 °C)] 97.7 °F (36.5 °C)  HR:  [70-79] 71  Resp:  [16-18] 16  BP: ()/() 133/72  SpO2:  [96 %-100 %] 100 %  Body mass index is 25.75 kg/m².      Input and Output Summary (last 24 hours): Intake/Output Summary (Last 24 hours) at 11/12/2023 1438  Last data filed at 11/12/2023 0954  Gross per 24 hour   Intake 720 ml   Output 350 ml   Net 370 ml       Physical Exam:   Physical Exam     Vitals:    11/12/23 0951 11/12/23 0952 11/12/23 0954 11/12/23 1125   BP:  131/72 (!) 70/53 133/72   Pulse: 71 76 76 71   Patient Position - Orthostatic VS:              Comfortably in bed  Neck supple   Lungs diminished breath sounds bilateral  Heart sounds S1 and S2 noted  Abdomen soft nontender  Awake obesity commands  No edema  No rash      Additional Data:     Labs:  Results from last 7 days   Lab Units 11/12/23  0556   WBC Thousand/uL 9.00   HEMOGLOBIN g/dL 12.3   HEMATOCRIT % 36.6   PLATELETS Thousands/uL 141*   NEUTROS PCT % 66   LYMPHS PCT % 20   MONOS PCT % 13*   EOS PCT % 1     Results from last 7 days   Lab Units 11/12/23  0556 11/11/23  0651 11/10/23  1653   SODIUM mmol/L 140   < > 142   POTASSIUM mmol/L 3.6   < > 3.1*   CHLORIDE mmol/L 104   < > 105   CO2 mmol/L 27   < > 32   BUN mg/dL 17   < > 17   CREATININE mg/dL 0.84   < > 1.19   ANION GAP mmol/L 9   < > 5   CALCIUM mg/dL 8.6   < > 8.4   ALBUMIN g/dL  --   --  3.6   TOTAL BILIRUBIN mg/dL  --   --  0.48   ALK PHOS U/L  --   --  65   ALT U/L  --   --  13   AST U/L  --   --  10*   GLUCOSE RANDOM mg/dL 241*   < > 151*    < > = values in this interval not displayed.      Results from last 7 days   Lab Units 11/10/23  1036   INR  1.16     Results from last 7 days   Lab Units 11/12/23  1209 11/12/23  0728 11/11/23  2103 11/11/23  1745 11/11/23  1547 11/11/23  1113 11/11/23  0741 11/10/23  2115 11/10/23  1618 11/10/23  1334 11/10/23  1029   POC GLUCOSE mg/dl 285* 239* 257* 247* 292* 310* 202* 227* 182* 67 118     Results from last 7 days   Lab Units 11/11/23  0651   HEMOGLOBIN A1C % 8.8*     Results from last 7 days   Lab Units 11/10/23  1653   LACTIC ACID mmol/L 1.6       Lines/Drains:  Invasive Devices       Peripheral Intravenous Line  Duration Peripheral IV 11/10/23 Dorsal (posterior); Right Hand 2 days    Peripheral IV 11/10/23 Dorsal (posterior); Left Hand 1 day                      Telemetry:  Telemetry Orders (From admission, onward)               Telemetry Monitoring  Continuous x 24 Hours (Telem)        Comments: hypotensive   Question:  Reason for 24 Hour Telemetry  Answer:  Arrhythmias requiring acute medical intervention / PPM or ICD malfunction                     Telemetry Reviewed:  Sinus rhythm  Indication for Continued Telemetry Use: No indication for continued use. Will discontinue.               Imaging: Reviewed radiology reports from this admission including: chest CT scan, abdominal/pelvic CT, CT head, and xray(s)    Recent Cultures (last 7 days):         Last 24 Hours Medication List:   Current Facility-Administered Medications   Medication Dose Route Frequency Provider Last Rate    apixaban  5 mg Oral BID Carlene Hernandez MD      aspirin  81 mg Oral Daily Carlene Hernandez MD      calcitriol  0.25 mcg Oral Daily Carlene Hernandez MD      vitamin B-12  100 mcg Oral Daily Carlene Hernandez MD      ferrous sulfate  325 mg Oral Daily With Breakfast Carlene Hernandez MD      fludrocortisone  0.2 mg Oral Daily Carlene Hernandez MD      hydrocortisone  15 mg Oral Daily Carlene Hernandez MD      hydrocortisone  30 mg Oral Daily Carlene Hernandez MD      insulin glargine  15 Units Subcutaneous HS Anushka Hightower MD      insulin lispro  1-5 Units Subcutaneous HS Carlene Hernandez MD      insulin lispro  1-6 Units Subcutaneous TID With Meals Carlene Hernandez MD      insulin lispro  7 Units Subcutaneous TID With Meals Oluwatomisin Minda Saavedra MD      midodrine  15 mg Oral TID AC Carlene Hernandez MD      risperiDONE  1 mg Oral BID Carlene Hernandez MD      sodium chloride  1 g Oral TID With Meals Carlene Hrenandez MD          Today, Patient Was Seen By: Juan Raines MD    **Please Note: This note may have been constructed using a voice recognition system. **

## 2023-11-13 ENCOUNTER — HOME HEALTH ADMISSION (OUTPATIENT)
Dept: HOME HEALTH SERVICES | Facility: HOME HEALTHCARE | Age: 68
End: 2023-11-13
Payer: COMMERCIAL

## 2023-11-13 LAB
ANION GAP SERPL CALCULATED.3IONS-SCNC: 10 MMOL/L
BUN SERPL-MCNC: 16 MG/DL (ref 5–25)
CALCIUM SERPL-MCNC: 8.4 MG/DL (ref 8.4–10.2)
CHLORIDE SERPL-SCNC: 103 MMOL/L (ref 96–108)
CO2 SERPL-SCNC: 26 MMOL/L (ref 21–32)
CREAT SERPL-MCNC: 0.91 MG/DL (ref 0.6–1.3)
GFR SERPL CREATININE-BSD FRML MDRD: 87 ML/MIN/1.73SQ M
GLUCOSE SERPL-MCNC: 195 MG/DL (ref 65–140)
GLUCOSE SERPL-MCNC: 270 MG/DL (ref 65–140)
GLUCOSE SERPL-MCNC: 273 MG/DL (ref 65–140)
GLUCOSE SERPL-MCNC: 285 MG/DL (ref 65–140)
GLUCOSE SERPL-MCNC: 433 MG/DL (ref 65–140)
POTASSIUM SERPL-SCNC: 3.9 MMOL/L (ref 3.5–5.3)
SODIUM SERPL-SCNC: 139 MMOL/L (ref 135–147)

## 2023-11-13 PROCEDURE — 99232 SBSQ HOSP IP/OBS MODERATE 35: CPT | Performed by: INTERNAL MEDICINE

## 2023-11-13 PROCEDURE — 82948 REAGENT STRIP/BLOOD GLUCOSE: CPT

## 2023-11-13 PROCEDURE — 80048 BASIC METABOLIC PNL TOTAL CA: CPT | Performed by: INTERNAL MEDICINE

## 2023-11-13 RX ORDER — HYDROCORTISONE 20 MG/1
20 TABLET ORAL DAILY
Status: DISCONTINUED | OUTPATIENT
Start: 2023-11-14 | End: 2023-11-14 | Stop reason: HOSPADM

## 2023-11-13 RX ADMIN — ASPIRIN 81 MG CHEWABLE TABLET 81 MG: 81 TABLET CHEWABLE at 08:08

## 2023-11-13 RX ADMIN — INSULIN LISPRO 7 UNITS: 100 INJECTION, SOLUTION INTRAVENOUS; SUBCUTANEOUS at 11:34

## 2023-11-13 RX ADMIN — INSULIN LISPRO 4 UNITS: 100 INJECTION, SOLUTION INTRAVENOUS; SUBCUTANEOUS at 08:09

## 2023-11-13 RX ADMIN — INSULIN LISPRO 7 UNITS: 100 INJECTION, SOLUTION INTRAVENOUS; SUBCUTANEOUS at 16:51

## 2023-11-13 RX ADMIN — FERROUS SULFATE TAB 325 MG (65 MG ELEMENTAL FE) 325 MG: 325 (65 FE) TAB at 08:08

## 2023-11-13 RX ADMIN — MIDODRINE HYDROCHLORIDE 15 MG: 5 TABLET ORAL at 16:50

## 2023-11-13 RX ADMIN — HYDROCORTISONE 15 MG: 10 TABLET ORAL at 15:29

## 2023-11-13 RX ADMIN — APIXABAN 5 MG: 5 TABLET, FILM COATED ORAL at 16:50

## 2023-11-13 RX ADMIN — HYDROCORTISONE 30 MG: 20 TABLET ORAL at 08:08

## 2023-11-13 RX ADMIN — MIDODRINE HYDROCHLORIDE 15 MG: 5 TABLET ORAL at 05:54

## 2023-11-13 RX ADMIN — CALCITRIOL 0.25 MCG: 0.25 CAPSULE, LIQUID FILLED ORAL at 08:08

## 2023-11-13 RX ADMIN — SODIUM CHLORIDE 1 G: 1 TABLET ORAL at 08:08

## 2023-11-13 RX ADMIN — SODIUM CHLORIDE 1 G: 1 TABLET ORAL at 16:50

## 2023-11-13 RX ADMIN — VITAM B12 100 MCG: 100 TAB at 08:08

## 2023-11-13 RX ADMIN — SODIUM CHLORIDE 1 G: 1 TABLET ORAL at 11:35

## 2023-11-13 RX ADMIN — INSULIN LISPRO 6 UNITS: 100 INJECTION, SOLUTION INTRAVENOUS; SUBCUTANEOUS at 11:34

## 2023-11-13 RX ADMIN — INSULIN GLARGINE 15 UNITS: 100 INJECTION, SOLUTION SUBCUTANEOUS at 21:34

## 2023-11-13 RX ADMIN — INSULIN LISPRO 4 UNITS: 100 INJECTION, SOLUTION INTRAVENOUS; SUBCUTANEOUS at 16:51

## 2023-11-13 RX ADMIN — FLUDROCORTISONE ACETATE 0.2 MG: 0.1 TABLET ORAL at 08:08

## 2023-11-13 RX ADMIN — MIDODRINE HYDROCHLORIDE 15 MG: 5 TABLET ORAL at 11:35

## 2023-11-13 RX ADMIN — INSULIN LISPRO 7 UNITS: 100 INJECTION, SOLUTION INTRAVENOUS; SUBCUTANEOUS at 08:09

## 2023-11-13 RX ADMIN — INSULIN LISPRO 1 UNITS: 100 INJECTION, SOLUTION INTRAVENOUS; SUBCUTANEOUS at 21:34

## 2023-11-13 RX ADMIN — RISPERIDONE 1 MG: 1 TABLET ORAL at 16:50

## 2023-11-13 RX ADMIN — RISPERIDONE 1 MG: 1 TABLET ORAL at 08:08

## 2023-11-13 RX ADMIN — APIXABAN 5 MG: 5 TABLET, FILM COATED ORAL at 08:08

## 2023-11-13 NOTE — PLAN OF CARE
Problem: PAIN - ADULT  Goal: Verbalizes/displays adequate comfort level or baseline comfort level  Description: Interventions:  - Encourage patient to monitor pain and request assistance  - Assess pain using appropriate pain scale  - Administer analgesics based on type and severity of pain and evaluate response  - Implement non-pharmacological measures as appropriate and evaluate response  - Consider cultural and social influences on pain and pain management  - Notify physician/advanced practitioner if interventions unsuccessful or patient reports new pain  Outcome: Progressing     Problem: INFECTION - ADULT  Goal: Absence or prevention of progression during hospitalization  Description: INTERVENTIONS:  - Assess and monitor for signs and symptoms of infection  - Monitor lab/diagnostic results  - Monitor all insertion sites, i.e. indwelling lines, tubes, and drains  - Monitor endotracheal if appropriate and nasal secretions for changes in amount and color  - [place] appropriate cooling/warming therapies per order  - Administer medications as ordered  - Instruct and encourage patient and family to use good hand hygiene technique  - Identify and instruct in appropriate isolation precautions for identified infection/condition  Outcome: Progressing  Goal: Absence of fever/infection during neutropenic period  Description: INTERVENTIONS:  - Monitor WBC    Outcome: Progressing     Problem: SAFETY ADULT  Goal: Patient will remain free of falls  Description: INTERVENTIONS:  - Educate patient/family on patient safety including physical limitations  - Instruct patient to call for assistance with activity   - Consult OT/PT to assist with strengthening/mobility   - Keep Call bell within reach  - Keep bed low and locked with side rails adjusted as appropriate  - Keep care items and personal belongings within reach  - Initiate and maintain comfort rounds  - Make Fall Risk Sign visible to staff  - Offer Toileting every 2 Hours, in advance of need  - Initiate/Maintain bed alarm  - Obtain necessary fall risk management equipment: yellow socks  - Apply yellow socks and bracelet for high fall risk patients  - Consider moving patient to room near nurses station  Outcome: Progressing  Goal: Maintain or return to baseline ADL function  Description: INTERVENTIONS:  -  Assess patient's ability to carry out ADLs; assess patient's baseline for ADL function and identify physical deficits which impact ability to perform ADLs (bathing, care of mouth/teeth, toileting, grooming, dressing, etc.)  - Assess/evaluate cause of self-care deficits   - Assess range of motion  - Assess patient's mobility; develop plan if impaired  - Assess patient's need for assistive devices and provide as appropriate  - Encourage maximum independence but intervene and supervise when necessary  - Involve family in performance of ADLs  - Assess for home care needs following discharge   - Consider OT consult to assist with ADL evaluation and planning for discharge  - Provide patient education as appropriate  Outcome: Progressing  Goal: Maintains/Returns to pre admission functional level  Description: INTERVENTIONS:  - Perform BMAT or MOVE assessment daily.   - Set and communicate daily mobility goal to care team and patient/family/caregiver. - Collaborate with rehabilitation services on mobility goals if consulted  - Perform Range of Motion 3 times a day. - Reposition patient every 3 hours.   - Dangle patient 3 times a day  - Stand patient 3 times a day  - Ambulate patient 3 times a day  - Out of bed to chair 3 times a day   - Out of bed for meals 3 times a day  - Out of bed for toileting  - Record patient progress and toleration of activity level   Outcome: Progressing     Problem: DISCHARGE PLANNING  Goal: Discharge to home or other facility with appropriate resources  Description: INTERVENTIONS:  - Identify barriers to discharge w/patient and caregiver  - Arrange for needed discharge resources and transportation as appropriate  - Identify discharge learning needs (meds, wound care, etc.)  - Arrange for interpretive services to assist at discharge as needed  - Refer to Case Management Department for coordinating discharge planning if the patient needs post-hospital services based on physician/advanced practitioner order or complex needs related to functional status, cognitive ability, or social support system  Outcome: Progressing     Problem: Knowledge Deficit  Goal: Patient/family/caregiver demonstrates understanding of disease process, treatment plan, medications, and discharge instructions  Description: Complete learning assessment and assess knowledge base.   Interventions:  - Provide teaching at level of understanding  - Provide teaching via preferred learning methods  Outcome: Progressing     Problem: Prexisting or High Potential for Compromised Skin Integrity  Goal: Skin integrity is maintained or improved  Description: INTERVENTIONS:  - Identify patients at risk for skin breakdown  - Assess and monitor skin integrity  - Assess and monitor nutrition and hydration status  - Monitor labs   - Assess for incontinence   - Turn and reposition patient  - Assist with mobility/ambulation  - Relieve pressure over bony prominences  - Avoid friction and shearing  - Provide appropriate hygiene as needed including keeping skin clean and dry  - Evaluate need for skin moisturizer/barrier cream  - Collaborate with interdisciplinary team   - Patient/family teaching  - Consider wound care consult   Outcome: Progressing     Problem: MOBILITY - ADULT  Goal: Maintain or return to baseline ADL function  Description: INTERVENTIONS:  -  Assess patient's ability to carry out ADLs; assess patient's baseline for ADL function and identify physical deficits which impact ability to perform ADLs (bathing, care of mouth/teeth, toileting, grooming, dressing, etc.)  - Assess/evaluate cause of self-care deficits - Assess range of motion  - Assess patient's mobility; develop plan if impaired  - Assess patient's need for assistive devices and provide as appropriate  - Encourage maximum independence but intervene and supervise when necessary  - Involve family in performance of ADLs  - Assess for home care needs following discharge   - Consider OT consult to assist with ADL evaluation and planning for discharge  - Provide patient education as appropriate  Outcome: Progressing  Goal: Maintains/Returns to pre admission functional level  Description: INTERVENTIONS:  - Perform BMAT or MOVE assessment daily.   - Set and communicate daily mobility goal to care team and patient/family/caregiver. - Collaborate with rehabilitation services on mobility goals if consulted  - Perform Range of Motion 3 times a day. - Reposition patient every 3 hours.   - Dangle patient 3 times a day  - Stand patient 3 times a day  - Ambulate patient 3 times a day  - Out of bed to chair 3 times a day   - Out of bed for meals 3 times a day  - Out of bed for toileting  - Record patient progress and toleration of activity level   Outcome: Progressing

## 2023-11-13 NOTE — NURSING NOTE
Dr. Aguilar Jara with [facility] notified of patient's elevated blood glucose of 433. Patient asymptomatic at this time, will give scheduled insulin. Awaiting new orders.

## 2023-11-13 NOTE — CASE MANAGEMENT
Case Management Assessment & Discharge Planning Note    Patient name Lois Gaona  Location [facility] 825/[facility] 825-01 MRN [id number]  : 1955 Date 2023       Current Admission Date: 11/10/2023  Current Admission Diagnosis:Hypotension/syncope   Patient Active Problem List    Diagnosis Date Noted    Hypotension/syncope 11/10/2023    Fall 11/10/2023    Adrenal insufficiency ([address]) 11/10/2023    Orthostatic hypotension 11/10/2023    Type 1 diabetes mellitus with hypoglycemia ([address]) 11/10/2023    History of CVA (cerebrovascular accident) 11/10/2023    Paroxysmal atrial fibrillation ([address]) 11/10/2023      LOS (days): 3  Geometric Mean LOS (GMLOS) (days):   Days to GMLOS:     OBJECTIVE:    Risk of Unplanned Readmission Score: 11.76         Current admission status: Inpatient       Preferred Pharmacy:   [address] [place] [address]  909 [facility] [address]  Phone: [phone] Fax: 1600 [facility], [address] [place] [address] [address] 3690 [facility] [address] [place] [address]  Phone: [phone] Fax: [phone]    Primary Care Provider: No primary care provider on file. Primary Insurance: [facility]  Secondary Insurance:     ASSESSMENT:  Active Health Care Proxies    There are no active Health Care Proxies on file. Readmission Root Cause  30 Day Readmission: No    Patient Information  Admitted from[de-identified] Home  Mental Status: Alert  During Assessment patient was accompanied by: Not accompanied during assessment  Assessment information provided by[de-identified] Patient  Primary Caregiver: Self  Support Systems: Self, Spouse/significant other, [address] of Residence: [facility] [facility] do you live in?:  [address] entry access options.  Select all that apply.: Stairs  Number of steps to enter home.: 5  Do the steps have railings?: No  Type of Current Residence: [address] [place] [address] home  Upon entering residence, is there a bedroom on the main floor (no further steps)?: No  A bedroom is located on the following floor levels of residence (select all that apply):: 2nd Floor  Upon entering residence, is there a bathroom on the main floor (no further steps)?: No  Indicate which floors of current residence have a bathroom (select all the apply):: 2nd Floor  Homeless/housing insecurity resource given?: N/A  Living Arrangements: Lives w/ Spouse/significant other    Activities of Daily Living Prior to Admission  Functional Status: Assistance  Completes ADLs independently?: No  Level of ADL dependence: Assistance  Ambulates independently?: No  Level of ambulatory dependence: Assistance  Does patient use assisted devices?: Yes  Assisted Devices (DME) used:  Shower Chair, Walker  Does patient currently own DME?: Yes  What DME does the patient currently own?: Shower Chair, Walker  Does patient have a history of Outpatient Therapy (PT/OT)?: Yes  Does the patient have a history of Short-Term Rehab?: Yes  Does patient have a history of HHC?: Yes ([facility])  Does patient currently have [facility]?: No         Patient Information Continued  Income Source: SSI/SSD  Does patient have prescription coverage?: Yes  Within the past 12 months, you worried that your food would run out before you got the money to buy more.: Never true  Within the past 12 months, the food you bought just didn't last and you didn't have money to get more.: Never true  Food insecurity resource given?: N/A  Does patient receive dialysis treatments?: No  Does patient have a history of substance abuse?: No         Means of Transportation  Means of Transport to [facility][de-identified] Family transport  In the past 12 months, has lack of transportation kept you from medical appointments or from getting medications?: No  In the past 12 months, has lack of transportation kept you from meetings, work, or from getting things needed for daily living?: No  Was application for public transport provided?: N/A        DISCHARGE DETAILS:    Discharge planning discussed with[de-identified] patient  Freedom of Choice: Yes  Comments - Freedom of Choice: FOC reviewed  CM contacted family/caregiver?: No- see comments (pt AAO)  Were Treatment Team discharge recommendations reviewed with patient/caregiver?: Yes  Did patient/caregiver verbalize understanding of patient care needs?: Yes  Were patient/caregiver advised of the risks associated with not following Treatment Team discharge recommendations?: Yes    Contacts  Patient Contacts: Daughter- Pricilla Robledo  Relationship to Patient[de-identified] Family  Contact Method: Phone  Phone Number: [phone]  Reason/Outcome: Emergency [address]         Is the patient interested in [address] at discharge?: No    DME Referral Provided  Referral made for DME?: No                  Additional Comments: CM introduced self and role to patient at bedside. Completed chart review, last assessment completed June 2023, no changes. Pt reports he lives in  a 2 story home with his fiance in [address] ( [facility]) Utilizes walker to ambulate, hx of [facility] through [facility], [facility] and OP. CUrrently has a private caregiver for 20 hours a week through waiver program. CM to follow        Patient/caregiver received discharge checklist.  Content reviewed. Patient/caregiver encouraged to participate in discharge plan of care prior to discharge home. CM reviewed d/c planning process including the following: identifying help at home, patient preference for d/c planning needs, Discharge Lounge, [facility] Meds to Bed program, availability of treatment team to discuss questions or concerns patient and/or family may have regarding understanding medications and recognizing signs and symptoms once discharged. CM also encouraged patient to follow up with all recommended appointments after discharge. Patient advised of importance for patient and family to participate in managing patient’s medical well being.

## 2023-11-13 NOTE — ASSESSMENT & PLAN NOTE
Patient presents with fall in the bathroom  Syncope likely multifactorial with positive orthostasis  History of autonomic dysfunction, adrenal insufficiency, orthostatic hypotension noted  Received IV fluid hydration, IV hydrocortisone  Now on home regimen hydrocortisone 20 mg a.m., 15 mg evening  Fludrocortisone 0.2 mg daily  Midodrine 15 g 3 times daily  Salt tablets 1 g 3 times daily  Encourage adequate hydration  Compression stockings abdominal binder  Monitor blood pressures

## 2023-11-13 NOTE — QUICK NOTE
Chart reviewed.    Plan: Adjust dose of hydrocortisone to his home regimen of hydrocortisone 20mg qAM and 15 qPM

## 2023-11-13 NOTE — ASSESSMENT & PLAN NOTE
History of orthostatic hypotension likely multifactorial with history of anomic dysfunction and adrenal insufficiency  Continue midodrine 15 mg 3 times daily daily  Salt tablets  Encourage adequate hydration  Compression stockings, abdominal binder when out of bed  Safe ambulation  Cardiology input noted

## 2023-11-13 NOTE — PROGRESS NOTES
[facility]  Progress Note  Name: Karthikeyan Ross I  MRN: [id number]  Unit/Bed#: [facility] 825-01 I Date of Admission: 11/10/2023   Date of Service: 11/13/2023 I Hospital Day: 3    Assessment/Plan   * Hypotension/syncope  Assessment & Plan  Patient presents with fall in the bathroom  Syncope likely multifactorial with positive orthostasis  History of autonomic dysfunction, adrenal insufficiency, orthostatic hypotension noted  Received IV fluid hydration, IV hydrocortisone  Now on home regimen hydrocortisone 20 mg a.m., 15 mg evening  Fludrocortisone 0.2 mg daily  Midodrine 15 g 3 times daily  Salt tablets 1 g 3 times daily  Encourage adequate hydration  Compression stockings abdominal binder  Monitor blood pressures      Adrenal insufficiency (HCC)  Assessment & Plan  Home regimen hydrocortisone 20 mg in the morning, 15 mg in the evening and fludrocortisone 0.2 mg daily  Received IV hydrocortisone  Now on home regimen hydrocortisone 20 mg a.m., 50 mg in the evening  Continue fludrocortisone 0.2 mg daily  Endocrinology inputs noted        Orthostatic hypotension  Assessment & Plan  History of orthostatic hypotension likely multifactorial with history of anomic dysfunction and adrenal insufficiency  Continue midodrine 15 mg 3 times daily daily  Salt tablets  Encourage adequate hydration  Compression stockings, abdominal binder when out of bed  Safe ambulation  Cardiology input noted        Type 1 diabetes mellitus with hypoglycemia [facility])  Assessment & Plan  Lab Results   Component Value Date    HGBA1C 8.8 (H) 11/11/2023       Recent Labs     11/12/23  1633 11/12/23  2112 11/13/23  0716 11/13/23  1103   POCGLU 94 97 285* 433*         Blood Sugar Average: Last 72 hrs:  (P) 029.1219436829277639  On Toujeo 20 at bedtime, lispro 6 - 7 - 7 at home    Diabetes mellitus type 1  Uncontrolled  A1c 8.8 continue Lantus, Humalog with meals  Insulin dosing per endocrinology  Monitor Accu-Cheks  Avoid hypoglycemia      Paroxysmal atrial fibrillation (HCC)  Assessment & Plan  Rate control  Eliquis for anticoagulation  Cardiology input noted    History of CVA (cerebrovascular accident)  Assessment & Plan  Continue aspirin, Eliquis    Fall  Assessment & Plan  Presents after a fall and was a trauma alert  Trauma inputs noted  Imaging studies noted  Fall precautions  Safe ambulation  Physical therapy                   VTE Pharmacologic Prophylaxis: VTE Score: 3 Moderate Risk (Score 3-4) - Pharmacological DVT Prophylaxis Ordered: apixaban (Eliquis). Patient Centered Rounds: I performed bedside rounds with nursing staff today. Discussions with Specialists or Other Care Team Provider:     Education and Discussions with Family / Patient:  Patient, updated daughter Karen Belcher questions answered. Total Time Spent on Date of Encounter in care of patient: 30 mins. This time was spent on one or more of the following: performing physical exam; counseling and coordination of care; obtaining or reviewing history; documenting in the medical record; reviewing/ordering tests, medications or procedures; communicating with other healthcare professionals and discussing with patient's family/caregivers. Current Length of Stay: 3 day(s)  Current Patient Status: Inpatient   Certification Statement: The patient will continue to require additional inpatient hospital stay due to as outlined  Discharge Plan:  awaiting clinical and symptomatic improvement    Code Status: Level 1 - Full Code    Subjective:     Sitting up in chair comfortably  Reports feeling okay  Discussed with RN    Objective:     Vitals:   Temp (24hrs), Av.4 °F (36.9 °C), Min:97.7 °F (36.5 °C), Max:99.1 °F (37.3 °C)    Temp:  [97.7 °F (36.5 °C)-99.1 °F (37.3 °C)] 99.1 °F (37.3 °C)  HR:  [63-77] 63  Resp:  [16-21] 16  BP: ()/(44-96) 142/76  SpO2:  [97 %-100 %] 98 %  Body mass index is 25.75 kg/m².      Input and Output Summary (last 24 hours): Intake/Output Summary (Last 24 hours) at 11/13/2023 1602  Last data filed at 11/13/2023 1201  Gross per 24 hour   Intake 1161 ml   Output --   Net 1161 ml       Physical Exam:   Physical Exam       Vitals:    11/13/23 0952 11/13/23 1135 11/13/23 1532 11/13/23 1558   BP: (!) 89/48 108/60 127/69 142/76   Pulse: 71 65 66 63   Patient Position - Orthostatic VS:              Comfortably sitting up in chair  Neck supple  Lungs diminished breath sounds bilateral  Heart sounds S1-S2 noted  Abdomen soft nontender  Abdominal obesity noted  Awake obese simple commands  No pedal edema  No rash        Additional Data:     Labs:  Results from last 7 days   Lab Units 11/12/23  0556   WBC Thousand/uL 9.00   HEMOGLOBIN g/dL 12.3   HEMATOCRIT % 36.6   PLATELETS Thousands/uL 141*   NEUTROS PCT % 66   LYMPHS PCT % 20   MONOS PCT % 13*   EOS PCT % 1     Results from last 7 days   Lab Units 11/13/23  0532 11/11/23  0651 11/10/23  1653   SODIUM mmol/L 139   < > 142   POTASSIUM mmol/L 3.9   < > 3.1*   CHLORIDE mmol/L 103   < > 105   CO2 mmol/L 26   < > 32   BUN mg/dL 16   < > 17   CREATININE mg/dL 0.91   < > 1.19   ANION GAP mmol/L 10   < > 5   CALCIUM mg/dL 8.4   < > 8.4   ALBUMIN g/dL  --   --  3.6   TOTAL BILIRUBIN mg/dL  --   --  0.48   ALK PHOS U/L  --   --  65   ALT U/L  --   --  13   AST U/L  --   --  10*   GLUCOSE RANDOM mg/dL 270*   < > 151*    < > = values in this interval not displayed.      Results from last 7 days   Lab Units 11/10/23  1036   INR  1.16     Results from last 7 days   Lab Units 11/13/23  1103 11/13/23  0716 11/12/23  2112 11/12/23  1633 11/12/23  1209 11/12/23  0728 11/11/23  2103 11/11/23  1745 11/11/23  1547 11/11/23  1113 11/11/23  0741 11/10/23  2115   POC GLUCOSE mg/dl 433* 285* 97 94 285* 239* 257* 247* 292* 310* 202* 227*     Results from last 7 days   Lab Units 11/11/23  0651   HEMOGLOBIN A1C % 8.8*     Results from last 7 days   Lab Units 11/10/23  1653   LACTIC ACID mmol/L 1.6 Lines/Drains:  Invasive Devices       Peripheral Intravenous Line  Duration             Peripheral IV 11/10/23 Dorsal (posterior); Right Hand 3 days                          Imaging: Reviewed radiology reports from this admission including: chest CT scan and abdominal/pelvic CT    Recent Cultures (last 7 days):         Last 24 Hours Medication List:   Current Facility-Administered Medications   Medication Dose Route Frequency Provider Last Rate    apixaban  5 mg Oral BID Claude Mendenhall MD      aspirin  81 mg Oral Daily Claude Mendenhall MD      calcitriol  0.25 mcg Oral Daily Claude Mendenhall MD      vitamin B-12  100 mcg Oral Daily Claude Mendenhall MD      ferrous sulfate  325 mg Oral Daily With Breakfast Claude Mendenhall MD      fludrocortisone  0.2 mg Oral Daily Claude Mendenhall MD      hydrocortisone  15 mg Oral Daily Claude Mendenhall MD      Mary Anne Cabrera ON 11/14/2023] hydrocortisone  20 mg Oral Daily Kushal Alarcon MD      insulin glargine  15 Units Subcutaneous HS Kushal Alarcon MD      insulin lispro  1-5 Units Subcutaneous HS Claude Mendenhall MD      insulin lispro  1-6 Units Subcutaneous TID With Meals Claude Mendenhall MD      insulin lispro  7 Units Subcutaneous TID With Meals Oluwatomisin Soraya Aguirre MD      midodrine  15 mg Oral TID AC Claude Mendenhall MD      risperiDONE  1 mg Oral BID Claude Mendenhall MD      sodium chloride  1 g Oral TID With Meals Claude Mendenhall MD          Today, Patient Was Seen By: Giovanny More MD    **Please Note: This note may have been constructed using a voice recognition system. **

## 2023-11-13 NOTE — ASSESSMENT & PLAN NOTE
Lab Results   Component Value Date    HGBA1C 8.8 (H) 11/11/2023       Recent Labs     11/12/23  1633 11/12/23  2112 11/13/23  0716 11/13/23  1103   POCGLU 94 97 285* 433*         Blood Sugar Average: Last 72 hrs:  (P) 877.0859460830787074  On Toujeo 20 at bedtime, lispro 6 - 7 - 7 at home    Diabetes mellitus type 1  Uncontrolled  A1c 8.8 continue Lantus, Humalog with meals  Insulin dosing per endocrinology  Monitor Accu-Cheks  Avoid hypoglycemia

## 2023-11-13 NOTE — ASSESSMENT & PLAN NOTE
Home regimen hydrocortisone 20 mg in the morning, 15 mg in the evening and fludrocortisone 0.2 mg daily  Received IV hydrocortisone  Now on home regimen hydrocortisone 20 mg a.m., 50 mg in the evening  Continue fludrocortisone 0.2 mg daily  Endocrinology inputs noted

## 2023-11-13 NOTE — PLAN OF CARE
Problem: PAIN - ADULT  Goal: Verbalizes/displays adequate comfort level or baseline comfort level  Description: Interventions:  - Encourage patient to monitor pain and request assistance  - Assess pain using appropriate pain scale  - Administer analgesics based on type and severity of pain and evaluate response  - Implement non-pharmacological measures as appropriate and evaluate response  - Consider cultural and social influences on pain and pain management  - Notify physician/advanced practitioner if interventions unsuccessful or patient reports new pain  Outcome: Progressing     Problem: INFECTION - ADULT  Goal: Absence or prevention of progression during hospitalization  Description: INTERVENTIONS:  - Assess and monitor for signs and symptoms of infection  - Monitor lab/diagnostic results  - Monitor all insertion sites, i.e. indwelling lines, tubes, and drains  - Monitor endotracheal if appropriate and nasal secretions for changes in amount and color  - [place] appropriate cooling/warming therapies per order  - Administer medications as ordered  - Instruct and encourage patient and family to use good hand hygiene technique  - Identify and instruct in appropriate isolation precautions for identified infection/condition  Outcome: Progressing  Goal: Absence of fever/infection during neutropenic period  Description: INTERVENTIONS:  - Monitor WBC    Outcome: Progressing

## 2023-11-13 NOTE — CASE MANAGEMENT
Case Management Discharge Planning Note    Patient name Catie Sahu  Location [facility] 825/[facility] 753-88 MRN [id number]  : 1955 Date 2023       Current Admission Date: 11/10/2023  Current Admission Diagnosis:Hypotension/syncope   Patient Active Problem List    Diagnosis Date Noted    Hypotension/syncope 11/10/2023    Fall 11/10/2023    Adrenal insufficiency ([address]) 11/10/2023    Orthostatic hypotension 11/10/2023    Type 1 diabetes mellitus with hypoglycemia ([address]) 11/10/2023    History of CVA (cerebrovascular accident) 11/10/2023    Paroxysmal atrial fibrillation ([address]) 11/10/2023      LOS (days): 3  Geometric Mean LOS (GMLOS) (days):   Days to GMLOS:     OBJECTIVE:  Risk of Unplanned Readmission Score: 11.79         Current admission status: Inpatient   Preferred Pharmacy:   [address] [place] [address]  909 [facility] [address]  Phone: [phone] Fax: 1600 [address] [place] [address]  Phone: [phone] Fax: [phone]    Primary Care Provider: No primary care provider on file. Primary Insurance: [address]  REP  Secondary Insurance:     DISCHARGE DETAILS:           Pt recommended for HHC. Spoke to daughter, Richard Edwards who reports utilizing [address] in past and would like referral for [address] Reserved in [place].

## 2023-11-13 NOTE — PROGRESS NOTES
Cardiology Progress Note - Debbie Bird 79 y.o. male MRN: [id number]    Unit/Bed#: [facility] 825-01 Encounter: [id number]      Assessment:  Principal Problem:    Hypotension/syncope  Active Problems:    Fall    Adrenal insufficiency (HCC)    Orthostatic hypotension    Type 1 diabetes mellitus with hypoglycemia (HCC)    History of CVA (cerebrovascular accident)    Paroxysmal atrial fibrillation ([address])      Plan:  Patient with no issues overnight. He has no chest pain or significant dyspnea. His vital signs demonstrate elevated blood pressure. Patient with history of orthostatic hypotension and adrenal insufficiency. Patient on Florinef, midodrine and sodium chloride tablets. BMP today with potassium of 3.9 and creatinine 0.91. We will continue present medical regimen. Subjective:   Patient seen and examined. No significant events overnight.  negative. Objective:     Vitals: Blood pressure 162/86, pulse 71, temperature 97.8 °F (36.6 °C), resp. rate 21, height 5' 4" (1.626 m), weight 68 kg (150 lb), SpO2 97 %. , Body mass index is 25.75 kg/m².,   Orthostatic Blood Pressures      Flowsheet Row Most Recent Value   Blood Pressure 162/86 filed at 11/13/2023 0730   Patient Position - Orthostatic VS Standing - Orthostatic VS filed at 11/12/2023 1554        ,      Intake/Output Summary (Last 24 hours) at 11/13/2023 0914  Last data filed at 11/13/2023 0801  Gross per 24 hour   Intake 741 ml   Output --   Net 741 ml             Physical Exam:    GEN: Debbie Bird    NECK: supple, no carotid bruits, no JVD or HJR  HEART: normal rate, regular rhythm, normal S1 and S2, no murmurs, clicks, gallops or rubs   LUNGS: clear to auscultation bilaterally; no wheezes, rales, or rhonchi   ABDOMEN: normal bowel sounds, soft, no tenderness, no distention  EXTREMITIES: peripheral pulses normal; no clubbing, cyanosis, or edema  SKIN: warm and well perfused, no suspicious lesions on exposed skin    Labs & Results:    Admission on 11/10/2023 Component Date Value    WBC 11/10/2023 6.55     RBC 11/10/2023 3.25 (L)     Hemoglobin 11/10/2023 10.3 (L)     Hematocrit 11/10/2023 30.9 (L)     MCV 11/10/2023 95     MCH 11/10/2023 31.7     MCHC 11/10/2023 33.3     RDW 11/10/2023 15.2 (H)     MPV 11/10/2023 10.9     Platelets 49/36/1599 139 (L)     nRBC 11/10/2023 0     Neutrophils Relative 11/10/2023 54     Immat GRANS % 11/10/2023 0     Lymphocytes Relative 11/10/2023 33     Monocytes Relative 11/10/2023 12     Eosinophils Relative 11/10/2023 1     Basophils Relative 11/10/2023 0     Neutrophils Absolute 11/10/2023 3.49     Immature Grans Absolute 11/10/2023 0.02     Lymphocytes Absolute 11/10/2023 2.16     Monocytes Absolute 11/10/2023 0.80     Eosinophils Absolute 11/10/2023 0.07     Basophils Absolute 11/10/2023 0.01     Total CK 11/10/2023 23 (L)     Protime 11/10/2023 14.7 (H)     INR 11/10/2023 1.16     PTT 11/10/2023 25     hs TnI 0hr 11/10/2023 32     ph, Darvin ISTAT 11/10/2023 7.415 (H)     pCO2, Darvin i-STAT 11/10/2023 46.5     pO2, Darvin i-STAT 11/10/2023 26.0 (L)     BE, i-STAT 11/10/2023 5 (H)     HCO3, Darvin i-STAT 11/10/2023 29.8     CO2, i-STAT 11/10/2023 31     O2 Sat, i-STAT 11/10/2023 47 (L)     SODIUM, I-STAT 11/10/2023 143     Potassium, i-STAT 11/10/2023 3.0 (L)     Calcium, Ionized i-STAT 11/10/2023 1.13     Hct, i-STAT 11/10/2023 34 (L)     Hgb, i-STAT 11/10/2023 11.6 (L)     Glucose, i-STAT 11/10/2023 118     Specimen Type 11/10/2023 VENOUS     POC Glucose 11/10/2023 118     Ventricular Rate 11/10/2023 73     Atrial Rate 11/10/2023 73     ND Interval 11/10/2023 160     QRSD Interval 11/10/2023 74     QT Interval 11/10/2023 436     QTC Interval 11/10/2023 480     P Axis 11/10/2023 80     QRS [place] 11/10/2023 56     T Wave [place] 11/10/2023 -13     hs TnI 2hr 11/10/2023 27     Delta 2hr hsTnI 11/10/2023 -5     POC Glucose 11/10/2023 67     POC Glucose 11/10/2023 182 (H)     Sodium 11/10/2023 142     Potassium 11/10/2023 3.1 (L)     Chloride 11/10/2023 105     CO2 11/10/2023 32     ANION GAP 11/10/2023 5     BUN 11/10/2023 17     Creatinine 11/10/2023 1.19     Glucose 11/10/2023 151 (H)     Calcium 11/10/2023 8.4     AST 11/10/2023 10 (L)     ALT 11/10/2023 13     Alkaline Phosphatase 11/10/2023 65     Total Protein 11/10/2023 5.8 (L)     Albumin 11/10/2023 3.6     Total Bilirubin 11/10/2023 0.48     eGFR 11/10/2023 63     Magnesium 11/10/2023 1.6 (L)     LACTIC ACID 11/10/2023 1.6     POC Glucose 11/10/2023 227 (H)     Hemoglobin A1C 11/11/2023 8.8 (H)     EAG 11/11/2023 206     Sodium 11/11/2023 139     Potassium 11/11/2023 3.8     Chloride 11/11/2023 102     CO2 11/11/2023 29     ANION GAP 11/11/2023 8     BUN 11/11/2023 19     Creatinine 11/11/2023 1.04     Glucose 11/11/2023 218 (H)     Calcium 11/11/2023 8.6     eGFR 11/11/2023 74     Magnesium 11/11/2023 2.0     WBC 11/11/2023 7.22     RBC 11/11/2023 4.02     Hemoglobin 11/11/2023 12.5     Hematocrit 11/11/2023 38.0     MCV 11/11/2023 95     MCH 11/11/2023 31.1     MCHC 11/11/2023 32.9     RDW 11/11/2023 15.3 (H)     Platelets 90/37/9189 161     MPV 11/11/2023 10.7     POC Glucose 11/11/2023 202 (H)     POC Glucose 11/11/2023 310 (H)     POC Glucose 11/11/2023 292 (H)     POC Glucose 11/11/2023 247 (H)     POC Glucose 11/11/2023 257 (H)     Sodium 11/12/2023 140     Potassium 11/12/2023 3.6     Chloride 11/12/2023 104     CO2 11/12/2023 27     ANION GAP 11/12/2023 9     BUN 11/12/2023 17     Creatinine 11/12/2023 0.84     Glucose 11/12/2023 241 (H)     Calcium 11/12/2023 8.6     eGFR 11/12/2023 91     WBC 11/12/2023 9.00     RBC 11/12/2023 3.79 (L)     Hemoglobin 11/12/2023 12.3     Hematocrit 11/12/2023 36.6     MCV 11/12/2023 97     MCH 11/12/2023 32.5     MCHC 11/12/2023 33.6     RDW 11/12/2023 15.1     MPV 11/12/2023 11.2     Platelets 83/54/6790 141 (L)     nRBC 11/12/2023 0     Neutrophils Relative 11/12/2023 66     Immat GRANS % 11/12/2023 0     Lymphocytes Relative 11/12/2023 20 Monocytes Relative 11/12/2023 13 (H)     Eosinophils Relative 11/12/2023 1     Basophils Relative 11/12/2023 0     Neutrophils Absolute 11/12/2023 5.90     Immature Grans Absolute 11/12/2023 0.03     Lymphocytes Absolute 11/12/2023 1.84     Monocytes Absolute 11/12/2023 1.15     Eosinophils Absolute 11/12/2023 0.06     Basophils Absolute 11/12/2023 0.02     POC Glucose 11/12/2023 239 (H)     POC Glucose 11/12/2023 285 (H)     POC Glucose 11/12/2023 94     POC Glucose 11/12/2023 97     Sodium 11/13/2023 139     Potassium 11/13/2023 3.9     Chloride 11/13/2023 103     CO2 11/13/2023 26     ANION GAP 11/13/2023 10     BUN 11/13/2023 16     Creatinine 11/13/2023 0.91     Glucose 11/13/2023 270 (H)     Calcium 11/13/2023 8.4     eGFR 11/13/2023 87     POC Glucose 11/13/2023 285 (H)        TRAUMA - CT chest abdomen pelvis w contrast    Result Date: 11/10/2023  Narrative: CT CHEST, ABDOMEN, AND PELVIS WITH IV CONTRAST INDICATION:   TRAUMA. COMPARISON: CT abdomen/pelvis 8/20/2023. CT chest abdomen/pelvis 11/30/2022. . TECHNIQUE: CT examination of the chest, abdomen, and pelvis was performed. Axial, sagittal, and coronal 2D reformatted images were created from the source data and submitted for interpretation. Radiation dose length product (DLP) for this visit:  918.66 mGy-cm . This examination, like all CT scans performed in the [facility], was performed utilizing techniques to minimize radiation dose exposure, including the use of iterative  reconstruction and automated exposure control. IV Contrast:  100 mL of iohexol (OMNIPAQUE) Enteric Contrast: None. FINDINGS: CHEST: LARGE AIRWAYS: Large airways are clear with no tracheal or endobronchial lesion. LUNGS: No consolidation. No edema. Few scattered micronodules (1-2 mm). PLEURA: No pleural effusion or pneumothorax. HEART: Normal cardiac size and morphology. Mitral annular calcification. Moderate coronary artery calcification.  VESSELS: Normal caliber thoracic aorta with mild atherosclerotic plaque. No dissection. Normal caliber main pulmonary artery. No filling defects to suggest pulmonary embolism in the main or lobar pulmonary arteries. MEDIASTINUM AND JOSE: No lymphadenopathy. Diffusely thickened esophagus with a small amount of fluid, findings which could reflect esophageal reflux/esophagitis. CHEST WALL AND LOWER NECK:   Within normal limits. ABDOMEN: LIVER: Normal size and morphology. No suspicious lesion. No perihepatic fluid. BILIARY: No intrahepatic biliary ductal dilatation. Normal caliber common bile duct. GALLBLADDER: No calcified gallstones. Normal wall thickness. No pericholecystic inflammatory changes. SPLEEN: Within normal limits. No suspicious lesion. Normal spleen size. No perisplenic fluid. Tiny splenule noted. PANCREAS: Punctate pancreatic glandular calcifications are noted most consistent with the sequela of chronic pancreatitis. No acute findings. No main pancreatic ductal dilatation. No peripancreatic inflammation. ADRENAL GLANDS: Within normal limits. KIDNEYS/URETERS: Normal size and position. Symmetric enhancement. No suspicious lesion. No calcified stones or hydronephrosis. Ureters within normal limits. No perinephric fluid. STOMACH AND BOWEL: Stomach is grossly within normal limits. Normal caliber small bowel. Normal caliber large bowel. Large colonic stool burden. No evidence of active small or large bowel inflammatory process. APPENDIX: No findings to suggest acute appendicitis. ABDOMINOPELVIC CAVITY: No ascites. No intraperitoneal free air. No lymphadenopathy. No retroperitoneal hematoma. VESSELS: Normal caliber abdominal aorta with mild atherosclerotic plaque. PELVIS REPRODUCTIVE ORGANS: Normal prostate. Symmetric seminal vesicles. URINARY BLADDER: Within normal limits. No calculi. ABDOMINAL WALL/INGUINAL REGIONS: Within normal limits. BONES: Moderate multilevel degenerative changes in the spine.  Small superior endplate Schmorl's node of L4. No acute fracture. Old healed fractures of the right lateral and posterolateral eighth-12th ribs; the eighth and ninth rib fractures are new from 11/2022. Impression: No acute traumatic findings in the chest, abdomen, or pelvis. I personally discussed this study with Dustin Palacio on 11/10/2023 11:37 AM. Workstation performed: [id number]     XR Trauma multiple (B/RA trauma bay ONLY)    Result Date: 11/10/2023  Narrative: TRAUMA SERIES INDICATION:  TRAUMA. COMPARISON:  None VIEWS:  XR TRAUMA MULTIPLE, XR CHEST 1 VIEW FINDINGS: CHEST: Supine frontal view of the chest is obtained. Cardiomediastinal silhouette is within normal limits accounting for technique and patient positioning. Lungs are clear. No layering pleural effusions detected. No pneumothorax is seen on this supine film. Upright images are more sensitive to detect anterior pneumothoraces if relevant. No displaced fractures. PELVIS No acute fracture or hip dislocation. No significant degenerative changes. No lytic or blastic osseous lesion. Soft tissues are unremarkable. The visualized lumbar spine is unremarkable. Impression: 1. No acute cardiopulmonary disease within limitations of supine imaging. 2.  No acute osseous abnormality. Workstation performed: [id number]     XR chest 1 view    Result Date: 11/10/2023  Narrative: TRAUMA SERIES INDICATION:  TRAUMA. COMPARISON:  None VIEWS:  XR TRAUMA MULTIPLE, XR CHEST 1 VIEW FINDINGS: CHEST: Supine frontal view of the chest is obtained. Cardiomediastinal silhouette is within normal limits accounting for technique and patient positioning. Lungs are clear. No layering pleural effusions detected. No pneumothorax is seen on this supine film. Upright images are more sensitive to detect anterior pneumothoraces if relevant. No displaced fractures. PELVIS No acute fracture or hip dislocation. No significant degenerative changes. No lytic or blastic osseous lesion.  Soft tissues are unremarkable. The visualized lumbar spine is unremarkable. Impression: 1. No acute cardiopulmonary disease within limitations of supine imaging. 2.  No acute osseous abnormality. Workstation performed: [id number]     TRAUMA - CT spine cervical wo contrast    Result Date: 11/10/2023  Narrative: CT CERVICAL SPINE - WITHOUT CONTRAST INDICATION:   TRAUMA. COMPARISON:  None. TECHNIQUE:  CT examination of the cervical spine was performed without intravenous contrast.  Contiguous axial images were obtained. Multiplanar 2D reformatted images were created from the source data. Radiation dose length product (DLP) for this visit:  433.22 mGy-cm . This examination, like all CT scans performed in the [place], was performed utilizing techniques to minimize radiation dose exposure, including the use of iterative  reconstruction and automated exposure control. IMAGE QUALITY:  Diagnostic. FINDINGS: ALIGNMENT:  Normal alignment of the cervical spine. No subluxation. VERTEBRAE:  No fracture. DEGENERATIVE CHANGES:  Moderate multilevel cervical degenerative changes are noted. No critical central canal stenosis. PREVERTEBRAL AND PARASPINAL SOFT TISSUES: Unremarkable THORACIC INLET:  Please refer to the concurrent chest CT report for thoracic inlet findings. Impression: No cervical spine fracture or traumatic malalignment. I personally discussed this study with Randi Melendez on 11/10/2023 11:37 AM. Workstation performed: [id number]     TRAUMA - CT head wo contrast    Result Date: 11/10/2023  Narrative: CT BRAIN - WITHOUT CONTRAST INDICATION:   TRAUMA. COMPARISON: CT head 8/20/2023. TECHNIQUE:  CT examination of the brain was performed. Multiplanar 2D reformatted images were created from the source data. Radiation dose length product (DLP) for this visit:  983.25 mGy-cm .   This examination, like all CT scans performed in the [place], was performed utilizing techniques to minimize radiation dose exposure, including the use of iterative  reconstruction and automated exposure control. IMAGE QUALITY:  Diagnostic. FINDINGS: PARENCHYMA: Decreased attenuation is noted in periventricular and subcortical white matter demonstrating an appearance that is statistically most likely to represent moderate microangiopathic change; this appearance is similar when compared to most recent prior examination. There are chronic bilateral basal ganglia lacunar infarcts. No CT signs of acute infarction. No intracranial mass, mass effect or midline shift. No acute parenchymal hemorrhage. VENTRICLES AND EXTRA-AXIAL SPACES:  Ventricles and extra-axial CSF spaces are prominent commensurate with the degree of volume loss. No hydrocephalus. No acute extra-axial hemorrhage. VISUALIZED ORBITS: No intraocular orbital trauma. Bilateral lens replacements. PARANASAL SINUSES: Moderate mucosal thickening in the right worse than left maxillary sinuses. CALVARIUM AND EXTRACRANIAL SOFT TISSUES:  Normal.     Impression: No intracranial hemorrhage or calvarial fracture. I personally discussed this study with Marvel Leon on 11/10/2023 11:37 AM. Workstation performed: [facility] bedside procedure    Result Date: 11/10/2023  Narrative: [id number]. [id number]. [id number]      EKG personally reviewed by Elver Mcclure MD.     Counseling / Coordination of Care  Total floor / unit time spent today 30 minutes. Greater than 50% of total time was spent with the patient and / or family counseling and / or coordination of care.

## 2023-11-14 ENCOUNTER — HOME CARE VISIT (OUTPATIENT)
Dept: HOME HEALTH SERVICES | Facility: HOME HEALTHCARE | Age: 68
End: 2023-11-14

## 2023-11-14 VITALS
BODY MASS INDEX: 25.61 KG/M2 | HEIGHT: 64 IN | HEART RATE: 75 BPM | DIASTOLIC BLOOD PRESSURE: 61 MMHG | SYSTOLIC BLOOD PRESSURE: 112 MMHG | TEMPERATURE: 97.5 F | WEIGHT: 150 LBS | RESPIRATION RATE: 16 BRPM | OXYGEN SATURATION: 98 %

## 2023-11-14 DIAGNOSIS — N18.5 STAGE 5 CHRONIC KIDNEY DISEASE NOT ON CHRONIC DIALYSIS (HCC): ICD-10-CM

## 2023-11-14 LAB
GLUCOSE SERPL-MCNC: 186 MG/DL (ref 65–140)
GLUCOSE SERPL-MCNC: 297 MG/DL (ref 65–140)

## 2023-11-14 PROCEDURE — 99239 HOSP IP/OBS DSCHRG MGMT >30: CPT | Performed by: STUDENT IN AN ORGANIZED HEALTH CARE EDUCATION/TRAINING PROGRAM

## 2023-11-14 PROCEDURE — 82948 REAGENT STRIP/BLOOD GLUCOSE: CPT

## 2023-11-14 PROCEDURE — 99232 SBSQ HOSP IP/OBS MODERATE 35: CPT | Performed by: INTERNAL MEDICINE

## 2023-11-14 RX ORDER — SODIUM CHLORIDE 1 G/1
1 TABLET ORAL 3 TIMES DAILY
Qty: 90 TABLET | Refills: 2 | Status: SHIPPED | OUTPATIENT
Start: 2023-11-14

## 2023-11-14 RX ORDER — CALCITRIOL 0.25 UG/1
0.25 CAPSULE, LIQUID FILLED ORAL DAILY
Qty: 30 CAPSULE | Refills: 0 | Status: SHIPPED | OUTPATIENT
Start: 2023-11-15

## 2023-11-14 RX ADMIN — RISPERIDONE 1 MG: 1 TABLET ORAL at 08:05

## 2023-11-14 RX ADMIN — INSULIN LISPRO 7 UNITS: 100 INJECTION, SOLUTION INTRAVENOUS; SUBCUTANEOUS at 12:15

## 2023-11-14 RX ADMIN — INSULIN LISPRO 4 UNITS: 100 INJECTION, SOLUTION INTRAVENOUS; SUBCUTANEOUS at 12:15

## 2023-11-14 RX ADMIN — SODIUM CHLORIDE 1 G: 1 TABLET ORAL at 08:05

## 2023-11-14 RX ADMIN — MIDODRINE HYDROCHLORIDE 15 MG: 5 TABLET ORAL at 06:33

## 2023-11-14 RX ADMIN — FLUDROCORTISONE ACETATE 0.2 MG: 0.1 TABLET ORAL at 08:06

## 2023-11-14 RX ADMIN — MIDODRINE HYDROCHLORIDE 15 MG: 5 TABLET ORAL at 12:15

## 2023-11-14 RX ADMIN — CALCITRIOL 0.25 MCG: 0.25 CAPSULE, LIQUID FILLED ORAL at 08:05

## 2023-11-14 RX ADMIN — VITAM B12 100 MCG: 100 TAB at 08:05

## 2023-11-14 RX ADMIN — APIXABAN 5 MG: 5 TABLET, FILM COATED ORAL at 08:05

## 2023-11-14 RX ADMIN — SODIUM CHLORIDE 1 G: 1 TABLET ORAL at 12:15

## 2023-11-14 RX ADMIN — FERROUS SULFATE TAB 325 MG (65 MG ELEMENTAL FE) 325 MG: 325 (65 FE) TAB at 08:05

## 2023-11-14 RX ADMIN — HYDROCORTISONE 20 MG: 20 TABLET ORAL at 08:06

## 2023-11-14 RX ADMIN — INSULIN LISPRO 7 UNITS: 100 INJECTION, SOLUTION INTRAVENOUS; SUBCUTANEOUS at 08:07

## 2023-11-14 RX ADMIN — INSULIN LISPRO 1 UNITS: 100 INJECTION, SOLUTION INTRAVENOUS; SUBCUTANEOUS at 08:07

## 2023-11-14 RX ADMIN — ASPIRIN 81 MG CHEWABLE TABLET 81 MG: 81 TABLET CHEWABLE at 08:05

## 2023-11-14 NOTE — PLAN OF CARE

## 2023-11-14 NOTE — ASSESSMENT & PLAN NOTE
Patient presents with fall in the bathroom  Syncope secondary to orthostatic hypotension with component of adrenal insufficiency and autonomic dysfunction  Continue home hydrocortisone 20 mg in the morning and 50 mg in the evening  Continue fludrocortisone 0.2 mg daily  Continue midodrine 15 g 3 times daily  Increase salt tablets 1 g to 3 times daily  Encourage adequate hydration  Compression stockings abdominal binder  Blood pressure stable at time of discharge

## 2023-11-14 NOTE — ASSESSMENT & PLAN NOTE
Home regimen hydrocortisone 20 mg in the morning, 15 mg in the evening and fludrocortisone 0.2 mg daily  Received IV hydrocortisone  Continue home hydrocortisone regimen  Continue fludrocortisone 0.2 mg daily  Endocrinology inputs noted

## 2023-11-14 NOTE — DISCHARGE SUMMARY
[address] [facility]  Discharge- Corry Gomez 1955, 79 y.o. male MRN: [id number]  [facility] Encounter: [id number]  Primary Care Provider: No primary care provider on file.    Date and time admitted to hospital: 11/10/2023 10:23 AM    Paroxysmal atrial fibrillation [facility])  Assessment & Plan  Not on rate control medications due to chronic hypotension  Eliquis for anticoagulation    History of CVA (cerebrovascular accident)  Assessment & Plan  Continue aspirin, Eliquis    Type 1 diabetes mellitus with hypoglycemia [facility])  Assessment & Plan  Lab Results   Component Value Date    HGBA1C 8.8 (H) 11/11/2023       Recent Labs     11/13/23  1559 11/13/23  2031 11/14/23  0724 11/14/23  1124   POCGLU 273* 195* 186* 297*         Blood Sugar Average: Last 72 hrs:  (P) [id number]  On Toujeo 20 at bedtime, lispro 6 - 7 - 7 at home    Adrenal insufficiency (HCC)  Assessment & Plan  Home regimen hydrocortisone 20 mg in the morning, 15 mg in the evening and fludrocortisone 0.2 mg daily  Received IV hydrocortisone  Continue home hydrocortisone regimen  Continue fludrocortisone 0.2 mg daily  Endocrinology inputs noted        Fall  Assessment & Plan  Presents after a fall and was a trauma alert  Trauma inputs noted  Imaging studies noted  Fall precautions  Safe ambulation  Physical therapy, discharged with home health care      * Hypotension/syncope  Assessment & Plan  Patient presents with fall in the bathroom  Syncope secondary to orthostatic hypotension with component of adrenal insufficiency and autonomic dysfunction  Continue home hydrocortisone 20 mg in the morning and 50 mg in the evening  Continue fludrocortisone 0.2 mg daily  Continue midodrine 15 g 3 times daily  Increase salt tablets 1 g to 3 times daily  Encourage adequate hydration  Compression stockings abdominal binder  Blood pressure stable at time of discharge          Medical Problems       Resolved Problems  Date Reviewed: 11/14/2023   None       Discharging Physician / Practitioner: Kacy Sharma MD  PCP: No primary care provider on file. Admission Date:   Admission Orders (From admission, onward)       Ordered        11/10/23 1636  Inpatient Admission  Once                          Discharge Date: 11/14/23    Consultations During Hospital Stay:  Cardiology  Endocrinology    Procedures Performed:   None    Significant Findings / Test Results:   None    Incidental Findings:   None    Test Results Pending at Discharge (will require follow up): None     Outpatient Tests Requested:  None    Complications: None    Reason for Admission: Syncope    Hospital Course:   Uche Liu is a 79 y.o. male patient who originally presented to the hospital on 11/10/2023 due to syncope after standing from the toilet. Patient has a history of orthostatic hypotension, autonomic dysfunction and adrenal insufficiency. During hospital stay he was found to have positive orthostatics. He received IV hydrocortisone and evaluated by endocrinology who recommended continuing current home regimen. Sodium tablets were modified. He was discharged in stable condition. Please see above list of diagnoses and related plan for additional information. Condition at Discharge: stable    Discharge Day Visit / Exam:   Subjective: Patient feels well this morning, has no complaints. Ambulating with no lightheadedness or dizziness. Tolerating oral intake. No nausea or vomiting. Vitals: Blood Pressure: 112/61 (11/14/23 0816)  Pulse: 75 (11/14/23 0816)  Temperature: 97.5 °F (36.4 °C) (11/14/23 0816)  Temp Source: Oral (11/11/23 1100)  Respirations: 16 (11/14/23 0816)  Height: 5' 4" (162.6 cm) (11/10/23 2030)  Weight - Scale: 68 kg (150 lb) (11/10/23 2030)  SpO2: 98 % (11/14/23 0816)  Exam:   Physical Exam  Vitals and nursing note reviewed. Constitutional:       General: He is not in acute distress. Appearance: He is well-developed.    HENT: Head: Normocephalic and atraumatic. Eyes:      Conjunctiva/sclera: Conjunctivae normal.   Cardiovascular:      Rate and Rhythm: Normal rate and regular rhythm. Heart sounds: No murmur heard. Pulmonary:      Effort: Pulmonary effort is normal. No respiratory distress. Breath sounds: Normal breath sounds. Abdominal:      Palpations: Abdomen is soft. Tenderness: There is no abdominal tenderness. Musculoskeletal:         General: No swelling. Cervical back: Neck supple. Skin:     General: Skin is warm and dry. Neurological:      Mental Status: He is alert. Discussion with Family: Updated  (daughter) via phone. Discharge instructions/Information to patient and family:   See after visit summary for information provided to patient and family. Provisions for Follow-Up Care:  See after visit summary for information related to follow-up care and any pertinent home health orders. Mobility at time of Discharge:   Basic Mobility Inpatient Raw Score: 14  JH-HLM Goal: 4: Move to chair/commode  JH-HLM Achieved: 5: Stand (1 or more minutes)  HLM Goal achieved. Continue to encourage appropriate mobility. Disposition:   Home with VNA Services (Reminder: Complete face to face encounter)    Planned Readmission: no     Discharge Statement:  I spent 35 minutes discharging the patient. This time was spent on the day of discharge. I had direct contact with the patient on the day of discharge. Greater than 50% of the total time was spent examining patient, answering all patient questions, arranging and discussing plan of care with patient as well as directly providing post-discharge instructions. Additional time then spent on discharge activities. Discharge Medications:  See after visit summary for reconciled discharge medications provided to patient and/or family.       **Please Note: This note may have been constructed using a voice recognition system**

## 2023-11-14 NOTE — PROGRESS NOTES
Cardiology Progress Note - Dani Ayers 79 y.o. male MRN: [id number]    Unit/Bed#: [facility] 825-01 Encounter: [id number]      Assessment:  Principal Problem:    Hypotension/syncope  Active Problems:    Fall    Adrenal insufficiency (HCC)    Orthostatic hypotension    Type 1 diabetes mellitus with hypoglycemia (HCC)    History of CVA (cerebrovascular accident)    Paroxysmal atrial fibrillation ([address])      Plan:  Patient with no issues overnight. He has no chest pain or significant dyspnea. His vital signs demonstrate elevated blood pressure. Patient with history of orthostatic hypotension and adrenal insufficiency. Patient on Florinef, midodrine and sodium chloride tablets. We will continue present medical regimen. Subjective:   Patient seen and examined. No significant events overnight.  negative. Objective:     Vitals: Blood pressure (!) 173/91, pulse 76, temperature 97.5 °F (36.4 °C), resp. rate 16, height 5' 4" (1.626 m), weight 68 kg (150 lb), SpO2 99 %. , Body mass index is 25.75 kg/m².,   Orthostatic Blood Pressures      Flowsheet Row Most Recent Value   Blood Pressure 173/91 filed at 11/13/2023 2250   Patient Position - Orthostatic VS Standing - Orthostatic VS filed at 11/13/2023 0931        ,      Intake/Output Summary (Last 24 hours) at 11/14/2023 0742  Last data filed at 11/13/2023 1818  Gross per 24 hour   Intake 1058 ml   Output --   Net 1058 ml       No significant arrhythmias seen on telemetry review.        Physical Exam:    GEN: Dani Ayers appears well, alert and oriented x 3, pleasant and cooperative   NECK: supple, no carotid bruits, no JVD or HJR  HEART: normal rate, regular rhythm, normal S1 and S2, no murmurs, clicks, gallops or rubs   LUNGS: clear to auscultation bilaterally; no wheezes, rales, or rhonchi   ABDOMEN: normal bowel sounds, soft, no tenderness, no distention  EXTREMITIES: peripheral pulses normal; no clubbing, cyanosis, or edema  SKIN: warm and well perfused, no suspicious lesions on exposed skin    Labs & Results:    Admission on 11/10/2023   Component Date Value    WBC 11/10/2023 6.55     RBC 11/10/2023 3.25 (L)     Hemoglobin 11/10/2023 10.3 (L)     Hematocrit 11/10/2023 30.9 (L)     MCV 11/10/2023 95     MCH 11/10/2023 31.7     MCHC 11/10/2023 33.3     RDW 11/10/2023 15.2 (H)     MPV 11/10/2023 10.9     Platelets 09/76/2159 139 (L)     nRBC 11/10/2023 0     Neutrophils Relative 11/10/2023 54     Immat GRANS % 11/10/2023 0     Lymphocytes Relative 11/10/2023 33     Monocytes Relative 11/10/2023 12     Eosinophils Relative 11/10/2023 1     Basophils Relative 11/10/2023 0     Neutrophils Absolute 11/10/2023 3.49     Immature Grans Absolute 11/10/2023 0.02     Lymphocytes Absolute 11/10/2023 2.16     Monocytes Absolute 11/10/2023 0.80     Eosinophils Absolute 11/10/2023 0.07     Basophils Absolute 11/10/2023 0.01     Total CK 11/10/2023 23 (L)     Protime 11/10/2023 14.7 (H)     INR 11/10/2023 1.16     PTT 11/10/2023 25     hs TnI 0hr 11/10/2023 32     ph, Darvin ISTAT 11/10/2023 7.415 (H)     pCO2, Darvin i-STAT 11/10/2023 46.5     pO2, Darvin i-STAT 11/10/2023 26.0 (L)     BE, i-STAT 11/10/2023 5 (H)     HCO3, Darvin i-STAT 11/10/2023 29.8     CO2, i-STAT 11/10/2023 31     O2 Sat, i-STAT 11/10/2023 47 (L)     SODIUM, I-STAT 11/10/2023 143     Potassium, i-STAT 11/10/2023 3.0 (L)     Calcium, Ionized i-STAT 11/10/2023 1.13     Hct, i-STAT 11/10/2023 34 (L)     Hgb, i-STAT 11/10/2023 11.6 (L)     Glucose, i-STAT 11/10/2023 118     Specimen Type 11/10/2023 VENOUS     POC Glucose 11/10/2023 118     Ventricular Rate 11/10/2023 73     Atrial Rate 11/10/2023 73     NC Interval 11/10/2023 160     QRSD Interval 11/10/2023 74     QT Interval 11/10/2023 436     QTC Interval 11/10/2023 480     P Axis 11/10/2023 80     QRS Hawi 11/10/2023 56     T Wave Hawi 11/10/2023 -13     hs TnI 2hr 11/10/2023 27     Delta 2hr hsTnI 11/10/2023 -5     POC Glucose 11/10/2023 67     POC Glucose 11/10/2023 182 (H) Sodium 11/10/2023 142     Potassium 11/10/2023 3.1 (L)     Chloride 11/10/2023 105     CO2 11/10/2023 32     ANION GAP 11/10/2023 5     BUN 11/10/2023 17     Creatinine 11/10/2023 1.19     Glucose 11/10/2023 151 (H)     Calcium 11/10/2023 8.4     AST 11/10/2023 10 (L)     ALT 11/10/2023 13     Alkaline Phosphatase 11/10/2023 65     Total Protein 11/10/2023 5.8 (L)     Albumin 11/10/2023 3.6     Total Bilirubin 11/10/2023 0.48     eGFR 11/10/2023 63     Magnesium 11/10/2023 1.6 (L)     LACTIC ACID 11/10/2023 1.6     POC Glucose 11/10/2023 227 (H)     Hemoglobin A1C 11/11/2023 8.8 (H)     EAG 11/11/2023 206     Sodium 11/11/2023 139     Potassium 11/11/2023 3.8     Chloride 11/11/2023 102     CO2 11/11/2023 29     ANION GAP 11/11/2023 8     BUN 11/11/2023 19     Creatinine 11/11/2023 1.04     Glucose 11/11/2023 218 (H)     Calcium 11/11/2023 8.6     eGFR 11/11/2023 74     Magnesium 11/11/2023 2.0     WBC 11/11/2023 7.22     RBC 11/11/2023 4.02     Hemoglobin 11/11/2023 12.5     Hematocrit 11/11/2023 38.0     MCV 11/11/2023 95     MCH 11/11/2023 31.1     MCHC 11/11/2023 32.9     RDW 11/11/2023 15.3 (H)     Platelets 68/04/5511 161     MPV 11/11/2023 10.7     POC Glucose 11/11/2023 202 (H)     POC Glucose 11/11/2023 310 (H)     POC Glucose 11/11/2023 292 (H)     POC Glucose 11/11/2023 247 (H)     POC Glucose 11/11/2023 257 (H)     Sodium 11/12/2023 140     Potassium 11/12/2023 3.6     Chloride 11/12/2023 104     CO2 11/12/2023 27     ANION GAP 11/12/2023 9     BUN 11/12/2023 17     Creatinine 11/12/2023 0.84     Glucose 11/12/2023 241 (H)     Calcium 11/12/2023 8.6     eGFR 11/12/2023 91     WBC 11/12/2023 9.00     RBC 11/12/2023 3.79 (L)     Hemoglobin 11/12/2023 12.3     Hematocrit 11/12/2023 36.6     MCV 11/12/2023 97     MCH 11/12/2023 32.5     MCHC 11/12/2023 33.6     RDW 11/12/2023 15.1     MPV 11/12/2023 11.2     Platelets 23/64/0762 141 (L)     nRBC 11/12/2023 0     Neutrophils Relative 11/12/2023 66     Immat GRANS % 11/12/2023 0     Lymphocytes Relative 11/12/2023 20     Monocytes Relative 11/12/2023 13 (H)     Eosinophils Relative 11/12/2023 1     Basophils Relative 11/12/2023 0     Neutrophils Absolute 11/12/2023 5.90     Immature Grans Absolute 11/12/2023 0.03     Lymphocytes Absolute 11/12/2023 1.84     Monocytes Absolute 11/12/2023 1.15     Eosinophils Absolute 11/12/2023 0.06     Basophils Absolute 11/12/2023 0.02     POC Glucose 11/12/2023 239 (H)     POC Glucose 11/12/2023 285 (H)     POC Glucose 11/12/2023 94     POC Glucose 11/12/2023 97     Sodium 11/13/2023 139     Potassium 11/13/2023 3.9     Chloride 11/13/2023 103     CO2 11/13/2023 26     ANION GAP 11/13/2023 10     BUN 11/13/2023 16     Creatinine 11/13/2023 0.91     Glucose 11/13/2023 270 (H)     Calcium 11/13/2023 8.4     eGFR 11/13/2023 87     POC Glucose 11/13/2023 285 (H)     POC Glucose 11/13/2023 433 (H)     POC Glucose 11/13/2023 273 (H)     POC Glucose 11/13/2023 195 (H)        XR pelvis ap only 1 or 2 vw    Result Date: 11/13/2023  Narrative: TRAUMA SERIES INDICATION:  TRAUMA. COMPARISON:  None VIEWS:  XR TRAUMA MULTIPLE, XR CHEST 1 VIEW FINDINGS: CHEST: Supine frontal view of the chest is obtained. Cardiomediastinal silhouette is within normal limits accounting for technique and patient positioning. Lungs are clear. No layering pleural effusions detected. No pneumothorax is seen on this supine film. Upright images are more sensitive to detect anterior pneumothoraces if relevant. No displaced fractures. PELVIS No acute fracture or hip dislocation. No significant degenerative changes. No lytic or blastic osseous lesion. Soft tissues are unremarkable. The visualized lumbar spine is unremarkable. Impression: 1. No acute cardiopulmonary disease within limitations of supine imaging. 2.  No acute osseous abnormality.  Workstation performed: [id number]     TRAUMA - CT chest abdomen pelvis w contrast    Result Date: 11/10/2023  Narrative: CT CHEST, ABDOMEN, AND PELVIS WITH IV CONTRAST INDICATION:   TRAUMA. COMPARISON: CT abdomen/pelvis 8/20/2023. CT chest abdomen/pelvis 11/30/2022. . TECHNIQUE: CT examination of the chest, abdomen, and pelvis was performed. Axial, sagittal, and coronal 2D reformatted images were created from the source data and submitted for interpretation. Radiation dose length product (DLP) for this visit:  918.66 mGy-cm . This examination, like all CT scans performed in the [facility], was performed utilizing techniques to minimize radiation dose exposure, including the use of iterative  reconstruction and automated exposure control. IV Contrast:  100 mL of iohexol (OMNIPAQUE) Enteric Contrast: None. FINDINGS: CHEST: LARGE AIRWAYS: Large airways are clear with no tracheal or endobronchial lesion. LUNGS: No consolidation. No edema. Few scattered micronodules (1-2 mm). PLEURA: No pleural effusion or pneumothorax. HEART: Normal cardiac size and morphology. Mitral annular calcification. Moderate coronary artery calcification. VESSELS: Normal caliber thoracic aorta with mild atherosclerotic plaque. No dissection. Normal caliber main pulmonary artery. No filling defects to suggest pulmonary embolism in the main or lobar pulmonary arteries. MEDIASTINUM AND JOSE: No lymphadenopathy. Diffusely thickened esophagus with a small amount of fluid, findings which could reflect esophageal reflux/esophagitis. CHEST WALL AND LOWER NECK:   Within normal limits. ABDOMEN: LIVER: Normal size and morphology. No suspicious lesion. No perihepatic fluid. BILIARY: No intrahepatic biliary ductal dilatation. Normal caliber common bile duct. GALLBLADDER: No calcified gallstones. Normal wall thickness. No pericholecystic inflammatory changes. SPLEEN: Within normal limits. No suspicious lesion. Normal spleen size. No perisplenic fluid. Tiny splenule noted.  PANCREAS: Punctate pancreatic glandular calcifications are noted most consistent with the sequela of chronic pancreatitis. No acute findings. No main pancreatic ductal dilatation. No peripancreatic inflammation. ADRENAL GLANDS: Within normal limits. KIDNEYS/URETERS: Normal size and position. Symmetric enhancement. No suspicious lesion. No calcified stones or hydronephrosis. Ureters within normal limits. No perinephric fluid. STOMACH AND BOWEL: Stomach is grossly within normal limits. Normal caliber small bowel. Normal caliber large bowel. Large colonic stool burden. No evidence of active small or large bowel inflammatory process. APPENDIX: No findings to suggest acute appendicitis. ABDOMINOPELVIC CAVITY: No ascites. No intraperitoneal free air. No lymphadenopathy. No retroperitoneal hematoma. VESSELS: Normal caliber abdominal aorta with mild atherosclerotic plaque. PELVIS REPRODUCTIVE ORGANS: Normal prostate. Symmetric seminal vesicles. URINARY BLADDER: Within normal limits. No calculi. ABDOMINAL WALL/INGUINAL REGIONS: Within normal limits. BONES: Moderate multilevel degenerative changes in the spine. Small superior endplate Schmorl's node of L4. No acute fracture. Old healed fractures of the right lateral and posterolateral eighth-12th ribs; the eighth and ninth rib fractures are new from 11/2022. Impression: No acute traumatic findings in the chest, abdomen, or pelvis. I personally discussed this study with Martha Davis on 11/10/2023 11:37 AM. Workstation performed: [id number]     XR Trauma multiple (hospitals/[facility] trauma bay ONLY)    Result Date: 11/10/2023  Narrative: TRAUMA SERIES INDICATION:  TRAUMA. COMPARISON:  None VIEWS:  XR TRAUMA MULTIPLE, XR CHEST 1 VIEW FINDINGS: CHEST: Supine frontal view of the chest is obtained. Cardiomediastinal silhouette is within normal limits accounting for technique and patient positioning. Lungs are clear. No layering pleural effusions detected. No pneumothorax is seen on this supine film.   Upright images are more sensitive to detect anterior pneumothoraces if relevant. No displaced fractures. PELVIS No acute fracture or hip dislocation. No significant degenerative changes. No lytic or blastic osseous lesion. Soft tissues are unremarkable. The visualized lumbar spine is unremarkable. Impression: 1. No acute cardiopulmonary disease within limitations of supine imaging. 2.  No acute osseous abnormality. Workstation performed: [id number]     XR chest 1 view    Result Date: 11/10/2023  Narrative: TRAUMA SERIES INDICATION:  TRAUMA. COMPARISON:  None VIEWS:  XR TRAUMA MULTIPLE, XR CHEST 1 VIEW FINDINGS: CHEST: Supine frontal view of the chest is obtained. Cardiomediastinal silhouette is within normal limits accounting for technique and patient positioning. Lungs are clear. No layering pleural effusions detected. No pneumothorax is seen on this supine film. Upright images are more sensitive to detect anterior pneumothoraces if relevant. No displaced fractures. PELVIS No acute fracture or hip dislocation. No significant degenerative changes. No lytic or blastic osseous lesion. Soft tissues are unremarkable. The visualized lumbar spine is unremarkable. Impression: 1. No acute cardiopulmonary disease within limitations of supine imaging. 2.  No acute osseous abnormality. Workstation performed: [id number]     TRAUMA - CT spine cervical wo contrast    Result Date: 11/10/2023  Narrative: CT CERVICAL SPINE - WITHOUT CONTRAST INDICATION:   TRAUMA. COMPARISON:  None. TECHNIQUE:  CT examination of the cervical spine was performed without intravenous contrast.  Contiguous axial images were obtained. Multiplanar 2D reformatted images were created from the source data. Radiation dose length product (DLP) for this visit:  433.22 mGy-cm . This examination, like all CT scans performed in the [facility], was performed utilizing techniques to minimize radiation dose exposure, including the use of iterative  reconstruction and automated exposure control.  IMAGE QUALITY:  Diagnostic. FINDINGS: ALIGNMENT:  Normal alignment of the cervical spine. No subluxation. VERTEBRAE:  No fracture. DEGENERATIVE CHANGES:  Moderate multilevel cervical degenerative changes are noted. No critical central canal stenosis. PREVERTEBRAL AND PARASPINAL SOFT TISSUES: Unremarkable THORACIC INLET:  Please refer to the concurrent chest CT report for thoracic inlet findings. Impression: No cervical spine fracture or traumatic malalignment. I personally discussed this study with Yudy Mason on 11/10/2023 11:37 AM. Workstation performed: [id number]     TRAUMA - CT head wo contrast    Result Date: 11/10/2023  Narrative: CT BRAIN - WITHOUT CONTRAST INDICATION:   TRAUMA. COMPARISON: CT head 8/20/2023. TECHNIQUE:  CT examination of the brain was performed. Multiplanar 2D reformatted images were created from the source data. Radiation dose length product (DLP) for this visit:  983.25 mGy-cm . This examination, like all CT scans performed in the [facility], was performed utilizing techniques to minimize radiation dose exposure, including the use of iterative  reconstruction and automated exposure control. IMAGE QUALITY:  Diagnostic. FINDINGS: PARENCHYMA: Decreased attenuation is noted in periventricular and subcortical white matter demonstrating an appearance that is statistically most likely to represent moderate microangiopathic change; this appearance is similar when compared to most recent prior examination. There are chronic bilateral basal ganglia lacunar infarcts. No CT signs of acute infarction. No intracranial mass, mass effect or midline shift. No acute parenchymal hemorrhage. VENTRICLES AND EXTRA-AXIAL SPACES:  Ventricles and extra-axial CSF spaces are prominent commensurate with the degree of volume loss. No hydrocephalus. No acute extra-axial hemorrhage. VISUALIZED ORBITS: No intraocular orbital trauma. Bilateral lens replacements.  PARANASAL SINUSES: Moderate mucosal thickening in the right worse than left maxillary sinuses. CALVARIUM AND EXTRACRANIAL SOFT TISSUES:  Normal.     Impression: No intracranial hemorrhage or calvarial fracture. I personally discussed this study with Jannis Lesch on 11/10/2023 11:37 AM. Workstation performed: Garrick Ledbetter bedside procedure    Result Date: 11/10/2023  Narrative: [id number]. [id number]      EKG personally reviewed by Bryon Bowie MD.     Counseling / Coordination of Care  Total floor / unit time spent today 30 minutes. Greater than 50% of total time was spent with the patient and / or family counseling and / or coordination of care.

## 2023-11-14 NOTE — PLAN OF CARE
Problem: PAIN - ADULT  Goal: Verbalizes/displays adequate comfort level or baseline comfort level  Description: Interventions:  - Encourage patient to monitor pain and request assistance  - Assess pain using appropriate pain scale  - Administer analgesics based on type and severity of pain and evaluate response  - Implement non-pharmacological measures as appropriate and evaluate response  - Consider cultural and social influences on pain and pain management  - Notify physician/advanced practitioner if interventions unsuccessful or patient reports new pain  Outcome: Progressing     Problem: INFECTION - ADULT  Goal: Absence or prevention of progression during hospitalization  Description: INTERVENTIONS:  - Assess and monitor for signs and symptoms of infection  - Monitor lab/diagnostic results  - Monitor all insertion sites, i.e. indwelling lines, tubes, and drains  - Monitor endotracheal if appropriate and nasal secretions for changes in amount and color  - [place] appropriate cooling/warming therapies per order  - Administer medications as ordered  - Instruct and encourage patient and family to use good hand hygiene technique  - Identify and instruct in appropriate isolation precautions for identified infection/condition  Outcome: Progressing  Goal: Absence of fever/infection during neutropenic period  Description: INTERVENTIONS:  - Monitor WBC    Outcome: Progressing     Problem: SAFETY ADULT  Goal: Patient will remain free of falls  Description: INTERVENTIONS:  - Educate patient/family on patient safety including physical limitations  - Instruct patient to call for assistance with activity   - Consult OT/PT to assist with strengthening/mobility   - Keep Call bell within reach  - Keep bed low and locked with side rails adjusted as appropriate  - Keep care items and personal belongings within reach  - Initiate and maintain comfort rounds  - Make Fall Risk Sign visible to staff  - Offer Toileting every 4 Hours, in advance of need  - Initiate/Maintain alarm  - Obtain necessary fall risk management equipment:   - Apply yellow socks and bracelet for high fall risk patients  - Consider moving patient to room near nurses station  Outcome: Progressing  Goal: Maintain or return to baseline ADL function  Description: INTERVENTIONS:  -  Assess patient's ability to carry out ADLs; assess patient's baseline for ADL function and identify physical deficits which impact ability to perform ADLs (bathing, care of mouth/teeth, toileting, grooming, dressing, etc.)  - Assess/evaluate cause of self-care deficits   - Assess range of motion  - Assess patient's mobility; develop plan if impaired  - Assess patient's need for assistive devices and provide as appropriate  - Encourage maximum independence but intervene and supervise when necessary  - Involve family in performance of ADLs  - Assess for home care needs following discharge   - Consider OT consult to assist with ADL evaluation and planning for discharge  - Provide patient education as appropriate  Outcome: Progressing  Goal: Maintains/Returns to pre admission functional level  Description: INTERVENTIONS:  - Perform BMAT or MOVE assessment daily.   - Set and communicate daily mobility goal to care team and patient/family/caregiver. - Collaborate with rehabilitation services on mobility goals if consulted  - Perform Range of Motion 3 times a day. - Reposition patient every 3 hours.   - Dangle patient 3 times a day  - Stand patient 3 times a day  - Ambulate patient 3 times a day  - Out of bed to chair 3 times a day   - Out of bed for meals 3 times a day  - Out of bed for toileting  - Record patient progress and toleration of activity level   Outcome: Progressing     Problem: DISCHARGE PLANNING  Goal: Discharge to home or other facility with appropriate resources  Description: INTERVENTIONS:  - Identify barriers to discharge w/patient and caregiver  - Arrange for needed discharge resources and transportation as appropriate  - Identify discharge learning needs (meds, wound care, etc.)  - Arrange for interpretive services to assist at discharge as needed  - Refer to Case Management Department for coordinating discharge planning if the patient needs post-hospital services based on physician/advanced practitioner order or complex needs related to functional status, cognitive ability, or social support system  Outcome: Progressing     Problem: Knowledge Deficit  Goal: Patient/family/caregiver demonstrates understanding of disease process, treatment plan, medications, and discharge instructions  Description: Complete learning assessment and assess knowledge base.   Interventions:  - Provide teaching at level of understanding  - Provide teaching via preferred learning methods  Outcome: Progressing     Problem: Prexisting or High Potential for Compromised Skin Integrity  Goal: Skin integrity is maintained or improved  Description: INTERVENTIONS:  - Identify patients at risk for skin breakdown  - Assess and monitor skin integrity  - Assess and monitor nutrition and hydration status  - Monitor labs   - Assess for incontinence   - Turn and reposition patient  - Assist with mobility/ambulation  - Relieve pressure over bony prominences  - Avoid friction and shearing  - Provide appropriate hygiene as needed including keeping skin clean and dry  - Evaluate need for skin moisturizer/barrier cream  - Collaborate with interdisciplinary team   - Patient/family teaching  - Consider wound care consult   Outcome: Progressing     Problem: MOBILITY - ADULT  Goal: Maintain or return to baseline ADL function  Description: INTERVENTIONS:  -  Assess patient's ability to carry out ADLs; assess patient's baseline for ADL function and identify physical deficits which impact ability to perform ADLs (bathing, care of mouth/teeth, toileting, grooming, dressing, etc.)  - Assess/evaluate cause of self-care deficits   - Assess range of motion  - Assess patient's mobility; develop plan if impaired  - Assess patient's need for assistive devices and provide as appropriate  - Encourage maximum independence but intervene and supervise when necessary  - Involve family in performance of ADLs  - Assess for home care needs following discharge   - Consider OT consult to assist with ADL evaluation and planning for discharge  - Provide patient education as appropriate  Outcome: Progressing  Goal: Maintains/Returns to pre admission functional level  Description: INTERVENTIONS:  - Perform BMAT or MOVE assessment daily.   - Set and communicate daily mobility goal to care team and patient/family/caregiver. - Collaborate with rehabilitation services on mobility goals if consulted  - Perform Range of Motion 3 times a day. - Reposition patient every 3 hours. - Dangle patient 3 times a day  - Stand patient 3 times a day  - Ambulate patient 3 times a day  - Out of bed to chair 3 times a day   - Out of bed for meals 3 times a day  - Out of bed for toileting  - Record patient progress and toleration of activity level   Outcome: Progressing     Problem: Nutrition/Hydration-ADULT  Goal: Nutrient/Hydration intake appropriate for improving, restoring or maintaining nutritional needs  Description: Monitor and assess patient's nutrition/hydration status for malnutrition. Collaborate with interdisciplinary team and initiate plan and interventions as ordered. Monitor patient's weight and dietary intake as ordered or per policy. Utilize nutrition screening tool and intervene as necessary. Determine patient's food preferences and provide high-protein, high-caloric foods as appropriate.      INTERVENTIONS:  - Monitor oral intake, urinary output, labs, and treatment plans  - Assess nutrition and hydration status and recommend course of action  - Evaluate amount of meals eaten  - Assist patient with eating if necessary   - Allow adequate time for meals  - Recommend/ encourage appropriate diets, oral nutritional supplements, and vitamin/mineral supplements  - Order, calculate, and assess calorie counts as needed  - Recommend, monitor, and adjust tube feedings and TPN/PPN based on assessed needs  - Assess need for intravenous fluids  - Provide specific nutrition/hydration education as appropriate  - Include patient/family/caregiver in decisions related to nutrition  Outcome: Progressing

## 2023-11-14 NOTE — ASSESSMENT & PLAN NOTE
Lab Results   Component Value Date    HGBA1C 8.8 (H) 11/11/2023       Recent Labs     11/13/23  1559 11/13/23 2031 11/14/23  0724 11/14/23  1124   POCGLU 273* 195* 186* 297*         Blood Sugar Average: Last 72 hrs:  (P) [id number]  On Toujeo 20 at bedtime, lispro 6 - 7 - 7 at home

## 2023-11-14 NOTE — ASSESSMENT & PLAN NOTE
Presents after a fall and was a trauma alert  Trauma inputs noted  Imaging studies noted  Fall precautions  Safe ambulation  Physical therapy, discharged with home health care

## 2023-11-15 ENCOUNTER — HOME CARE VISIT (OUTPATIENT)
Dept: HOME HEALTH SERVICES | Facility: HOME HEALTHCARE | Age: 68
End: 2023-11-15
Payer: COMMERCIAL

## 2023-11-15 PROCEDURE — G0151 HHCP-SERV OF PT,EA 15 MIN: HCPCS

## 2023-11-15 PROCEDURE — 400013 VN SOC

## 2023-11-15 NOTE — CASE COMMUNICATION
KEVEN  to  attempt  to schedule  P.T.  [facility]  visit. Spoke  with  patients  daughter  who  reports  the  [facility]  address  is  patients  mailing  address  only. Patient  is  actually  residing  at  [address] [place] [address]. Please  change  address.

## 2023-11-16 ENCOUNTER — HOME CARE VISIT (OUTPATIENT)
Dept: HOME HEALTH SERVICES | Facility: HOME HEALTHCARE | Age: 68
End: 2023-11-16
Payer: COMMERCIAL

## 2023-11-16 VITALS — OXYGEN SATURATION: 98 % | SYSTOLIC BLOOD PRESSURE: 98 MMHG | DIASTOLIC BLOOD PRESSURE: 54 MMHG | HEART RATE: 83 BPM

## 2023-11-16 RX ORDER — CALCITRIOL 0.25 UG/1
0.25 CAPSULE, LIQUID FILLED ORAL DAILY
Qty: 90 CAPSULE | Refills: 1 | Status: SHIPPED | OUTPATIENT
Start: 2023-11-16

## 2023-11-17 ENCOUNTER — HOME CARE VISIT (OUTPATIENT)
Dept: HOME HEALTH SERVICES | Facility: HOME HEALTHCARE | Age: 68
End: 2023-11-17
Payer: COMMERCIAL

## 2023-11-17 VITALS — DIASTOLIC BLOOD PRESSURE: 57 MMHG | OXYGEN SATURATION: 98 % | HEART RATE: 63 BPM | SYSTOLIC BLOOD PRESSURE: 93 MMHG

## 2023-11-17 PROCEDURE — G0152 HHCP-SERV OF OT,EA 15 MIN: HCPCS

## 2023-11-18 ENCOUNTER — HOME CARE VISIT (OUTPATIENT)
Dept: HOME HEALTH SERVICES | Facility: HOME HEALTHCARE | Age: 68
End: 2023-11-18
Payer: COMMERCIAL

## 2023-11-19 NOTE — CASE COMMUNICATION
[facility] has admitted your patient to [address] [place] [address] with the following disciplines:  Pt  and  O.T.    Primary focus of home health care  Transfer  training  G.T.  on  level  and  stairs. Standing  balance  ex  with  instruction  in  home  safety  and  faall  prevention  measures. Continue  with  strengthening  ex  to  BLEs  and  instruction  in  HEP  Patient stated goals of care  to  improve  balance  and  not  fall  Antic ipated visit pattern and next visit date  Visits  2  x  pwe  week  x  3  weeks  See medication list Drung  interaction noted  between  Aspirin  and  Fludrocortisone  Significant clinical findings  Decreased  balance  with  hx  of  frequent  falls  Potential barriers to goal achievement  PAtients  forgetfulness  Other pertinent information  na    Thank you for allowing us to participate in the care of your patient.         Dewey knox P.T.

## 2023-11-20 ENCOUNTER — HOME CARE VISIT (OUTPATIENT)
Dept: HOME HEALTH SERVICES | Facility: HOME HEALTHCARE | Age: 68
End: 2023-11-20
Payer: COMMERCIAL

## 2023-11-20 VITALS — OXYGEN SATURATION: 96 % | HEART RATE: 73 BPM | SYSTOLIC BLOOD PRESSURE: 120 MMHG | DIASTOLIC BLOOD PRESSURE: 64 MMHG

## 2023-11-20 PROCEDURE — G0151 HHCP-SERV OF PT,EA 15 MIN: HCPCS

## 2023-11-21 DIAGNOSIS — E27.40 ADRENAL INSUFFICIENCY (HCC): ICD-10-CM

## 2023-11-21 RX ORDER — FLUDROCORTISONE ACETATE 0.1 MG/1
0.2 TABLET ORAL DAILY
Qty: 60 TABLET | Refills: 0 | Status: SHIPPED | OUTPATIENT
Start: 2023-11-21 | End: 2023-12-01 | Stop reason: SDUPTHER

## 2023-11-22 ENCOUNTER — HOME CARE VISIT (OUTPATIENT)
Dept: HOME HEALTH SERVICES | Facility: HOME HEALTHCARE | Age: 68
End: 2023-11-22
Payer: COMMERCIAL

## 2023-11-22 PROCEDURE — G0151 HHCP-SERV OF PT,EA 15 MIN: HCPCS

## 2023-11-24 VITALS — HEART RATE: 76 BPM | OXYGEN SATURATION: 99 % | SYSTOLIC BLOOD PRESSURE: 120 MMHG | DIASTOLIC BLOOD PRESSURE: 70 MMHG

## 2023-11-24 DIAGNOSIS — E10.649 TYPE 1 DIABETES MELLITUS WITH HYPOGLYCEMIA AND WITHOUT COMA (HCC): ICD-10-CM

## 2023-11-24 PROCEDURE — G0180 MD CERTIFICATION HHA PATIENT: HCPCS | Performed by: INTERNAL MEDICINE

## 2023-11-27 RX ORDER — ACYCLOVIR 400 MG/1
1 TABLET ORAL
Qty: 3 EACH | Refills: 5 | Status: SHIPPED | OUTPATIENT
Start: 2023-11-27 | End: 2023-11-29 | Stop reason: SDUPTHER

## 2023-11-28 ENCOUNTER — TELEPHONE (OUTPATIENT)
Dept: ENDOCRINOLOGY | Facility: CLINIC | Age: 68
End: 2023-11-28

## 2023-11-28 NOTE — TELEPHONE ENCOUNTER
Patient's daughter called stating dexcom script was never received at [facility], [address] [place] [address]. Requested if prescription could be sent to [facility] on [address] Instead.

## 2023-11-29 DIAGNOSIS — E10.649 TYPE 1 DIABETES MELLITUS WITH HYPOGLYCEMIA AND WITHOUT COMA (HCC): ICD-10-CM

## 2023-11-29 RX ORDER — ACYCLOVIR 400 MG/1
1 TABLET ORAL
Qty: 3 EACH | Refills: 0 | Status: SHIPPED | OUTPATIENT
Start: 2023-11-29 | End: 2023-11-29 | Stop reason: SDUPTHER

## 2023-11-29 RX ORDER — ACYCLOVIR 400 MG/1
1 TABLET ORAL
Qty: 3 EACH | Refills: 5 | Status: SHIPPED | OUTPATIENT
Start: 2023-11-29

## 2023-11-29 NOTE — TELEPHONE ENCOUNTER
Pt's daughter left another vm stating Dexcom script has not been sent to [facility] on [address] Requesting a courtesy call once sent.

## 2023-11-30 NOTE — TELEPHONE ENCOUNTER
Returned call to patients daughter, left voicemail informing her that script was sent to pharmacy. Left call back number for questions.

## 2023-12-01 DIAGNOSIS — E27.40 ADRENAL INSUFFICIENCY (HCC): ICD-10-CM

## 2023-12-01 DIAGNOSIS — E10.9 INSULIN DEPENDENT TYPE 1 DIABETES MELLITUS (HCC): ICD-10-CM

## 2023-12-01 RX ORDER — INSULIN GLARGINE 300 U/ML
20 INJECTION, SOLUTION SUBCUTANEOUS
Qty: 12 ML | Refills: 0 | Status: ON HOLD | OUTPATIENT
Start: 2023-12-01 | End: 2023-12-07 | Stop reason: SDUPTHER

## 2023-12-01 RX ORDER — HYDROCORTISONE 5 MG/1
TABLET ORAL
Qty: 180 TABLET | Refills: 0 | OUTPATIENT
Start: 2023-12-01

## 2023-12-01 RX ORDER — INSULIN LISPRO 100 [IU]/ML
INJECTION, SOLUTION INTRAVENOUS; SUBCUTANEOUS
Qty: 36 ML | Refills: 1 | Status: SHIPPED | OUTPATIENT
Start: 2023-12-01

## 2023-12-01 RX ORDER — FLUDROCORTISONE ACETATE 0.1 MG/1
0.2 TABLET ORAL DAILY
Qty: 60 TABLET | Refills: 0 | Status: SHIPPED | OUTPATIENT
Start: 2023-12-01

## 2023-12-01 RX ORDER — HYDROCORTISONE 5 MG/1
TABLET ORAL
Qty: 360 TABLET | Refills: 3 | Status: SHIPPED | OUTPATIENT
Start: 2023-12-01

## 2023-12-01 RX ORDER — PEN NEEDLE, DIABETIC 32GX 5/32"
NEEDLE, DISPOSABLE MISCELLANEOUS
Qty: 100 EACH | Refills: 5 | Status: SHIPPED | OUTPATIENT
Start: 2023-12-01

## 2023-12-04 ENCOUNTER — APPOINTMENT (EMERGENCY)
Dept: RADIOLOGY | Facility: HOSPITAL | Age: 68
DRG: 074 | End: 2023-12-04
Payer: COMMERCIAL

## 2023-12-04 ENCOUNTER — APPOINTMENT (EMERGENCY)
Dept: CT IMAGING | Facility: HOSPITAL | Age: 68
DRG: 074 | End: 2023-12-04
Payer: COMMERCIAL

## 2023-12-04 ENCOUNTER — HOSPITAL ENCOUNTER (INPATIENT)
Facility: HOSPITAL | Age: 68
LOS: 2 days | Discharge: NURSING, REHAB OR LONG-TERM CARE | DRG: 074 | End: 2023-12-07
Attending: STUDENT IN AN ORGANIZED HEALTH CARE EDUCATION/TRAINING PROGRAM | Admitting: INTERNAL MEDICINE
Payer: COMMERCIAL

## 2023-12-04 DIAGNOSIS — E27.40 ADRENAL INSUFFICIENCY (HCC): ICD-10-CM

## 2023-12-04 DIAGNOSIS — R55 SYNCOPE: Primary | ICD-10-CM

## 2023-12-04 DIAGNOSIS — N17.9 AKI (ACUTE KIDNEY INJURY) (HCC): ICD-10-CM

## 2023-12-04 DIAGNOSIS — R68.89 SPELLS OF DECREASED ATTENTIVENESS: ICD-10-CM

## 2023-12-04 DIAGNOSIS — E83.42 HYPOMAGNESEMIA: ICD-10-CM

## 2023-12-04 DIAGNOSIS — E10.9 INSULIN DEPENDENT TYPE 1 DIABETES MELLITUS (HCC): ICD-10-CM

## 2023-12-04 DIAGNOSIS — I95.9 HYPOTENSION: ICD-10-CM

## 2023-12-04 LAB
2HR DELTA HS TROPONIN: -3 NG/L
4HR DELTA HS TROPONIN: 12 NG/L
ALBUMIN SERPL BCP-MCNC: 4.2 G/DL (ref 3.5–5)
ALP SERPL-CCNC: 88 U/L (ref 34–104)
ALT SERPL W P-5'-P-CCNC: 12 U/L (ref 7–52)
ANION GAP SERPL CALCULATED.3IONS-SCNC: 10 MMOL/L
AST SERPL W P-5'-P-CCNC: 15 U/L (ref 13–39)
ATRIAL RATE: 71 BPM
BACTERIA UR QL AUTO: ABNORMAL /HPF
BASOPHILS # BLD AUTO: 0.02 THOUSANDS/ÂΜL (ref 0–0.1)
BASOPHILS NFR BLD AUTO: 0 % (ref 0–1)
BILIRUB SERPL-MCNC: 0.6 MG/DL (ref 0.2–1)
BILIRUB UR QL STRIP: NEGATIVE
BUN SERPL-MCNC: 27 MG/DL (ref 5–25)
CALCIUM SERPL-MCNC: 9 MG/DL (ref 8.4–10.2)
CARDIAC TROPONIN I PNL SERPL HS: 30 NG/L
CARDIAC TROPONIN I PNL SERPL HS: 33 NG/L
CARDIAC TROPONIN I PNL SERPL HS: 45 NG/L
CHLORIDE SERPL-SCNC: 104 MMOL/L (ref 96–108)
CLARITY UR: CLEAR
CO2 SERPL-SCNC: 23 MMOL/L (ref 21–32)
COLOR UR: ABNORMAL
CREAT SERPL-MCNC: 1.44 MG/DL (ref 0.6–1.3)
EOSINOPHIL # BLD AUTO: 0.08 THOUSAND/ÂΜL (ref 0–0.61)
EOSINOPHIL NFR BLD AUTO: 1 % (ref 0–6)
ERYTHROCYTE [DISTWIDTH] IN BLOOD BY AUTOMATED COUNT: 14.7 % (ref 11.6–15.1)
GFR SERPL CREATININE-BSD FRML MDRD: 50 ML/MIN/1.73SQ M
GLUCOSE SERPL-MCNC: 269 MG/DL (ref 65–140)
GLUCOSE SERPL-MCNC: 270 MG/DL (ref 65–140)
GLUCOSE UR STRIP-MCNC: ABNORMAL MG/DL
HCT VFR BLD AUTO: 41.3 % (ref 36.5–49.3)
HGB BLD-MCNC: 13.5 G/DL (ref 12–17)
HGB UR QL STRIP.AUTO: NEGATIVE
IMM GRANULOCYTES # BLD AUTO: 0.04 THOUSAND/UL (ref 0–0.2)
IMM GRANULOCYTES NFR BLD AUTO: 0 % (ref 0–2)
KETONES UR STRIP-MCNC: NEGATIVE MG/DL
LEUKOCYTE ESTERASE UR QL STRIP: NEGATIVE
LYMPHOCYTES # BLD AUTO: 2.9 THOUSANDS/ÂΜL (ref 0.6–4.47)
LYMPHOCYTES NFR BLD AUTO: 30 % (ref 14–44)
MCH RBC QN AUTO: 31.9 PG (ref 26.8–34.3)
MCHC RBC AUTO-ENTMCNC: 32.7 G/DL (ref 31.4–37.4)
MCV RBC AUTO: 98 FL (ref 82–98)
MONOCYTES # BLD AUTO: 1.07 THOUSAND/ÂΜL (ref 0.17–1.22)
MONOCYTES NFR BLD AUTO: 11 % (ref 4–12)
NEUTROPHILS # BLD AUTO: 5.52 THOUSANDS/ÂΜL (ref 1.85–7.62)
NEUTS SEG NFR BLD AUTO: 58 % (ref 43–75)
NITRITE UR QL STRIP: NEGATIVE
NON-SQ EPI CELLS URNS QL MICRO: ABNORMAL /HPF
NRBC BLD AUTO-RTO: 0 /100 WBCS
P AXIS: 59 DEGREES
PH UR STRIP.AUTO: 6 [PH]
PLATELET # BLD AUTO: 169 THOUSANDS/UL (ref 149–390)
PMV BLD AUTO: 11.3 FL (ref 8.9–12.7)
POTASSIUM SERPL-SCNC: 4.5 MMOL/L (ref 3.5–5.3)
PR INTERVAL: 162 MS
PROT SERPL-MCNC: 7.1 G/DL (ref 6.4–8.4)
PROT UR STRIP-MCNC: ABNORMAL MG/DL
QRS AXIS: 63 DEGREES
QRSD INTERVAL: 68 MS
QT INTERVAL: 424 MS
QTC INTERVAL: 460 MS
RBC # BLD AUTO: 4.23 MILLION/UL (ref 3.88–5.62)
RBC #/AREA URNS AUTO: ABNORMAL /HPF
SODIUM SERPL-SCNC: 137 MMOL/L (ref 135–147)
SP GR UR STRIP.AUTO: 1.01 (ref 1–1.03)
T WAVE AXIS: 270 DEGREES
UROBILINOGEN UR STRIP-ACNC: <2 MG/DL
VENTRICULAR RATE: 71 BPM
WBC # BLD AUTO: 9.63 THOUSAND/UL (ref 4.31–10.16)
WBC #/AREA URNS AUTO: ABNORMAL /HPF

## 2023-12-04 PROCEDURE — 82948 REAGENT STRIP/BLOOD GLUCOSE: CPT

## 2023-12-04 PROCEDURE — 36415 COLL VENOUS BLD VENIPUNCTURE: CPT

## 2023-12-04 PROCEDURE — 81001 URINALYSIS AUTO W/SCOPE: CPT | Performed by: STUDENT IN AN ORGANIZED HEALTH CARE EDUCATION/TRAINING PROGRAM

## 2023-12-04 PROCEDURE — 99285 EMERGENCY DEPT VISIT HI MDM: CPT

## 2023-12-04 PROCEDURE — 85025 COMPLETE CBC W/AUTO DIFF WBC: CPT | Performed by: STUDENT IN AN ORGANIZED HEALTH CARE EDUCATION/TRAINING PROGRAM

## 2023-12-04 PROCEDURE — 71045 X-RAY EXAM CHEST 1 VIEW: CPT

## 2023-12-04 PROCEDURE — 99223 1ST HOSP IP/OBS HIGH 75: CPT | Performed by: INTERNAL MEDICINE

## 2023-12-04 PROCEDURE — 99285 EMERGENCY DEPT VISIT HI MDM: CPT | Performed by: STUDENT IN AN ORGANIZED HEALTH CARE EDUCATION/TRAINING PROGRAM

## 2023-12-04 PROCEDURE — 96360 HYDRATION IV INFUSION INIT: CPT

## 2023-12-04 PROCEDURE — 90662 IIV NO PRSV INCREASED AG IM: CPT | Performed by: INTERNAL MEDICINE

## 2023-12-04 PROCEDURE — 80053 COMPREHEN METABOLIC PANEL: CPT | Performed by: STUDENT IN AN ORGANIZED HEALTH CARE EDUCATION/TRAINING PROGRAM

## 2023-12-04 PROCEDURE — G0008 ADMIN INFLUENZA VIRUS VAC: HCPCS | Performed by: INTERNAL MEDICINE

## 2023-12-04 PROCEDURE — G1004 CDSM NDSC: HCPCS

## 2023-12-04 PROCEDURE — 84484 ASSAY OF TROPONIN QUANT: CPT | Performed by: STUDENT IN AN ORGANIZED HEALTH CARE EDUCATION/TRAINING PROGRAM

## 2023-12-04 PROCEDURE — 70450 CT HEAD/BRAIN W/O DYE: CPT

## 2023-12-04 PROCEDURE — 93005 ELECTROCARDIOGRAM TRACING: CPT

## 2023-12-04 RX ORDER — INSULIN LISPRO 100 [IU]/ML
1-5 INJECTION, SOLUTION INTRAVENOUS; SUBCUTANEOUS
Status: DISCONTINUED | OUTPATIENT
Start: 2023-12-04 | End: 2023-12-07 | Stop reason: HOSPADM

## 2023-12-04 RX ORDER — RISPERIDONE 1 MG/1
1 TABLET ORAL 2 TIMES DAILY
Status: DISCONTINUED | OUTPATIENT
Start: 2023-12-04 | End: 2023-12-07 | Stop reason: HOSPADM

## 2023-12-04 RX ORDER — SODIUM CHLORIDE 9 MG/ML
100 INJECTION, SOLUTION INTRAVENOUS CONTINUOUS
Status: DISCONTINUED | OUTPATIENT
Start: 2023-12-04 | End: 2023-12-06

## 2023-12-04 RX ORDER — ACETAMINOPHEN 325 MG/1
650 TABLET ORAL EVERY 6 HOURS PRN
Status: DISCONTINUED | OUTPATIENT
Start: 2023-12-04 | End: 2023-12-07 | Stop reason: HOSPADM

## 2023-12-04 RX ORDER — CALCITRIOL 0.25 UG/1
0.25 CAPSULE, LIQUID FILLED ORAL DAILY
Status: DISCONTINUED | OUTPATIENT
Start: 2023-12-05 | End: 2023-12-07 | Stop reason: HOSPADM

## 2023-12-04 RX ORDER — FERROUS SULFATE 325(65) MG
325 TABLET ORAL
Status: DISCONTINUED | OUTPATIENT
Start: 2023-12-05 | End: 2023-12-07 | Stop reason: HOSPADM

## 2023-12-04 RX ORDER — SODIUM CHLORIDE 1 G/1
1 TABLET ORAL 3 TIMES DAILY
Status: DISCONTINUED | OUTPATIENT
Start: 2023-12-04 | End: 2023-12-07 | Stop reason: HOSPADM

## 2023-12-04 RX ORDER — INSULIN GLARGINE 100 [IU]/ML
18 INJECTION, SOLUTION SUBCUTANEOUS
Status: DISCONTINUED | OUTPATIENT
Start: 2023-12-04 | End: 2023-12-07 | Stop reason: HOSPADM

## 2023-12-04 RX ORDER — HYDROCORTISONE 10 MG/1
20 TABLET ORAL EVERY MORNING
Status: DISCONTINUED | OUTPATIENT
Start: 2023-12-05 | End: 2023-12-05

## 2023-12-04 RX ORDER — ATORVASTATIN CALCIUM 10 MG/1
10 TABLET, FILM COATED ORAL DAILY
Status: DISCONTINUED | OUTPATIENT
Start: 2023-12-05 | End: 2023-12-07 | Stop reason: HOSPADM

## 2023-12-04 RX ORDER — MIDODRINE HYDROCHLORIDE 5 MG/1
15 TABLET ORAL
Status: DISCONTINUED | OUTPATIENT
Start: 2023-12-05 | End: 2023-12-07 | Stop reason: HOSPADM

## 2023-12-04 RX ORDER — ONDANSETRON 2 MG/ML
4 INJECTION INTRAMUSCULAR; INTRAVENOUS EVERY 6 HOURS PRN
Status: DISCONTINUED | OUTPATIENT
Start: 2023-12-04 | End: 2023-12-07 | Stop reason: HOSPADM

## 2023-12-04 RX ORDER — ASPIRIN 81 MG/1
81 TABLET, CHEWABLE ORAL DAILY
Status: DISCONTINUED | OUTPATIENT
Start: 2023-12-05 | End: 2023-12-07 | Stop reason: HOSPADM

## 2023-12-04 RX ORDER — SERTRALINE HYDROCHLORIDE 100 MG/1
100 TABLET, FILM COATED ORAL DAILY
Status: DISCONTINUED | OUTPATIENT
Start: 2023-12-05 | End: 2023-12-07 | Stop reason: HOSPADM

## 2023-12-04 RX ORDER — FLUDROCORTISONE ACETATE 0.1 MG/1
0.2 TABLET ORAL DAILY
Status: DISCONTINUED | OUTPATIENT
Start: 2023-12-05 | End: 2023-12-05

## 2023-12-04 RX ORDER — INSULIN LISPRO 100 [IU]/ML
6 INJECTION, SOLUTION INTRAVENOUS; SUBCUTANEOUS
Status: DISCONTINUED | OUTPATIENT
Start: 2023-12-05 | End: 2023-12-07 | Stop reason: HOSPADM

## 2023-12-04 RX ORDER — INSULIN LISPRO 100 [IU]/ML
1-5 INJECTION, SOLUTION INTRAVENOUS; SUBCUTANEOUS
Status: DISCONTINUED | OUTPATIENT
Start: 2023-12-05 | End: 2023-12-07 | Stop reason: HOSPADM

## 2023-12-04 RX ADMIN — RISPERIDONE 1 MG: 1 TABLET ORAL at 19:59

## 2023-12-04 RX ADMIN — INFLUENZA A VIRUS A/VICTORIA/4897/2022 IVR-238 (H1N1) ANTIGEN (FORMALDEHYDE INACTIVATED), INFLUENZA A VIRUS A/DARWIN/9/2021 SAN-010 (H3N2) ANTIGEN (FORMALDEHYDE INACTIVATED), INFLUENZA B VIRUS B/PHUKET/3073/2013 ANTIGEN (FORMALDEHYDE INACTIVATED), AND INFLUENZA B VIRUS B/MICHIGAN/01/2021 ANTIGEN (FORMALDEHYDE INACTIVATED) 0.7 ML: 60; 60; 60; 60 INJECTION, SUSPENSION INTRAMUSCULAR at 20:03

## 2023-12-04 RX ADMIN — INSULIN LISPRO 3 UNITS: 100 INJECTION, SOLUTION INTRAVENOUS; SUBCUTANEOUS at 21:13

## 2023-12-04 RX ADMIN — SODIUM CHLORIDE 100 ML/HR: 0.9 INJECTION, SOLUTION INTRAVENOUS at 19:59

## 2023-12-04 RX ADMIN — APIXABAN 5 MG: 5 TABLET, FILM COATED ORAL at 19:59

## 2023-12-04 RX ADMIN — SODIUM CHLORIDE 1 G: 1 TABLET ORAL at 19:59

## 2023-12-04 RX ADMIN — INSULIN GLARGINE 18 UNITS: 100 INJECTION, SOLUTION SUBCUTANEOUS at 21:13

## 2023-12-04 RX ADMIN — SODIUM CHLORIDE 1000 ML: 0.9 INJECTION, SOLUTION INTRAVENOUS at 14:19

## 2023-12-04 NOTE — ED PROVIDER NOTES
History  Chief Complaint   Patient presents with    Weakness - Generalized     Pt presents to the ED from home with reports of hypotension and severe weakness. Reported near syncopal episode x2 while at doctors office. 65-year-old male with past medical history significant for A-fib on Eliquis, diabetes, hyperlipidemia, adrenal insufficiency presents to the ED for evaluation of hypotension and syncopal episodes. Has had several syncopal episodes over the past few weeks. Most recently today has had 2 syncopal episodes while at [address]. Daughter provides history as well and states that earlier today was diaphoretic with slurred speech but this has since resolved. No recent illness. Patient himself feels well and has no complaints at the time of my evaluation        Prior to Admission Medications   Prescriptions Last Dose Informant Patient Reported? Taking? Continuous Blood Gluc  (Dexcom G7 ) CYN   No No   Sig: Use 1 each continuous   Continuous Blood Gluc Sensor (Dexcom G7 Sensor)   No No   Sig: Use 1 Device every 10 days   Insulin Pen Needle (BD Pen Needle Josie 2nd Gen) 32G X 4 MM MISC   No No   Sig: For use with insulin pen 4 times daily. Pharmacy may dispense brand covered by insurance. Insulin Pen Needle (BD Pen Needle Josie 2nd Gen) 32G X 4 MM MISC   No No   Sig: FOR USE WITH INSULIN PEN. PHARMACY MAY DISPENSE BRAND COVERED BY INSURANCE.   apixaban (Eliquis) 5 mg   No No   Sig: Take 1 tablet (5 mg total) by mouth 2 (two) times a day   aspirin 81 mg chewable tablet   Yes No   Sig: Chew 81 mg daily   atorvastatin (LIPITOR) 10 mg tablet  Child Yes No   Sig: Take 1 tablet by mouth daily   calcitriol (ROCALTROL) 0.25 mcg capsule   No No   Sig: Take 1 capsule (0.25 mcg total) by mouth daily Do not start before November 15, 2023. cyanocobalamin (VITAMIN B-12) 100 MCG tablet   No No   Sig: Take 1 tablet (100 mcg total) by mouth daily Do not start before November 15, 2023. ergocalciferol (VITAMIN D2) 50,000 units   No No   Sig: Take 1 capsule (50,000 Units total) by mouth once a week   ferrous sulfate 324 (65 Fe) mg   No No   Sig: TAKE 1 TABLET (324 MG TOTAL) BY MOUTH DAILY BEFORE BREAKFAST   fludrocortisone (FLORINEF) 0.1 mg tablet   No No   Sig: Take 2 tablets (0.2 mg total) by mouth daily   hydrocortisone (CORTEF) 5 mg tablet   No No   Sig: Use 4tabs (20mg) in morning and 3 tabs (15mg) daily afternoon   insulin glargine (Toujeo Max SoloStar) 300 units/mL CONCENTRATED U-300 injection pen (2-unit dial)   No No   Sig: Inject 20 Units under the skin daily at bedtime   insulin lispro (HumaLOG KwikPen) 100 units/mL injection pen   No No   Siu tidac plus 1u/50 above 150mg/dL; max dose 40u/day   midodrine (PROAMATINE) 10 MG tablet   No No   Sig: Take 1.5 tablets (15 mg total) by mouth 3 (three) times a day before meals   polyethylene glycol (GOLYTELY) 4000 mL solution   No No   Sig: Take 4,000 mL by mouth once for 1 dose   potassium chloride (Klor-Con) 10 mEq tablet  Child Yes No   Sig: Take 20 mEq by mouth 2 (two) times a day   risperiDONE (RisperDAL) 1 mg tablet  Child Yes No   Sig: Take 1 tablet by mouth 2 (two) times a day   sertraline (ZOLOFT) 100 mg tablet  Child Yes No   Sig: Take 100 mg by mouth daily   sodium chloride 1 g tablet   No No   Sig: Take 1 tablet (1 g total) by mouth 3 (three) times a day   sodium chloride, concentrated, 2.5 MEQ/ML   Yes No   Sig: Take 1 g by mouth Three times a day      Facility-Administered Medications: None       Past Medical History:   Diagnosis Date    A-fib (HCC)     Adrenal insufficiency (HCC)     Atrial fibrillation (HCC)     Diabetes mellitus ([address])     Obesity, morbid ([address]) 2022    Stroke ([address])        No past surgical history on file.     Family History   Problem Relation Age of Onset    Heart disease Mother     Cancer Father     Multiple sclerosis Sister      I have reviewed and agree with the history as documented. E-Cigarette/Vaping    E-Cigarette Use Never User      E-Cigarette/Vaping Substances     Social History     Tobacco Use    Smokeless tobacco: Never   Vaping Use    Vaping Use: Never used   Substance Use Topics    Alcohol use: Not Currently     Alcohol/week: 5.0 standard drinks of alcohol     Types: 5 Cans of beer per week     Comment: Quit in august 2022    Drug use: Never       Review of Systems   Constitutional:  Positive for diaphoresis. Negative for chills and fever. HENT:  Negative for ear pain and sore throat. Eyes:  Negative for pain and visual disturbance. Respiratory:  Negative for cough and shortness of breath. Cardiovascular:  Negative for chest pain and palpitations. Gastrointestinal:  Negative for abdominal pain and vomiting. Genitourinary:  Negative for dysuria and hematuria. Musculoskeletal:  Negative for arthralgias and back pain. Skin:  Negative for color change and rash. Neurological:  Positive for syncope. Negative for seizures. All other systems reviewed and are negative. Physical Exam  Physical Exam  Vitals and nursing note reviewed. Constitutional:       General: He is not in acute distress. Appearance: He is well-developed. HENT:      Head: Normocephalic and atraumatic. Eyes:      Conjunctiva/sclera: Conjunctivae normal.   Cardiovascular:      Rate and Rhythm: Normal rate and regular rhythm. Heart sounds: No murmur heard. Pulmonary:      Effort: Pulmonary effort is normal. No respiratory distress. Breath sounds: Normal breath sounds. Abdominal:      Palpations: Abdomen is soft. Tenderness: There is no abdominal tenderness. Musculoskeletal:         General: No swelling. Cervical back: Neck supple. Skin:     General: Skin is warm and dry. Capillary Refill: Capillary refill takes less than 2 seconds. Neurological:      General: No focal deficit present.       Mental Status: He is alert and oriented to person, place, and time. Cranial Nerves: No cranial nerve deficit. Sensory: No sensory deficit. Motor: No weakness.       Coordination: Coordination normal.   Psychiatric:         Mood and Affect: Mood normal.         Vital Signs  ED Triage Vitals [12/04/23 1212]   Temperature Pulse Respirations Blood Pressure SpO2   98.3 °F (36.8 °C) 74 16 125/61 100 %      Temp Source Heart Rate Source Patient Position - Orthostatic VS BP Location FiO2 (%)   Oral Monitor Sitting Right arm --      Pain Score       --           Vitals:    12/04/23 1212   BP: 125/61   Pulse: 74   Patient Position - Orthostatic VS: Sitting         Visual Acuity      ED Medications  Medications   sodium chloride 0.9 % bolus 1,000 mL (0 mL Intravenous Stopped 12/4/23 1516)       Diagnostic Studies  Results Reviewed       Procedure Component Value Units Date/Time    HS Troponin I 2hr [[id number]]  (Normal) Collected: 12/04/23 1419    Lab Status: Final result Specimen: Blood from Arm, Right Updated: 12/04/23 1452     hs TnI 2hr 30 ng/L      Delta 2hr hsTnI -3 ng/L     HS Troponin I 4hr [[id number]]     Lab Status: No result Specimen: Blood     UA w Reflex to Microscopic w Reflex to Culture [[id number]]     Lab Status: No result Specimen: Urine     Comprehensive metabolic panel [[id number]]  (Abnormal) Collected: 12/04/23 1233    Lab Status: Final result Specimen: Blood from Arm, Right Updated: 12/04/23 1313     Sodium 137 mmol/L      Potassium 4.5 mmol/L      Chloride 104 mmol/L      CO2 23 mmol/L      ANION GAP 10 mmol/L      BUN 27 mg/dL      Creatinine 1.44 mg/dL      Glucose 269 mg/dL      Calcium 9.0 mg/dL      AST 15 U/L      ALT 12 U/L      Alkaline Phosphatase 88 U/L      Total Protein 7.1 g/dL      Albumin 4.2 g/dL      Total Bilirubin 0.60 mg/dL      eGFR 50 ml/min/1.73sq m     Narrative:      [place] guidelines for Chronic Kidney Disease (CKD):     Stage 1 with normal or high GFR (GFR > 90 mL/min/1.73 square meters)    Stage 2 Mild CKD (GFR = 60-89 mL/min/1.73 square meters)    Stage 3A Moderate CKD (GFR = 45-59 mL/min/1.73 square meters)    Stage 3B Moderate CKD (GFR = 30-44 mL/min/1.73 square meters)    Stage 4 Severe CKD (GFR = 15-29 mL/min/1.73 square meters)    Stage 5 End Stage CKD (GFR <15 mL/min/1.73 square meters)  Note: GFR calculation is accurate only with a steady state creatinine    HS Troponin 0hr (reflex protocol) [[id number]]  (Normal) Collected: 12/04/23 1233    Lab Status: Final result Specimen: Blood from Arm, Right Updated: 12/04/23 1306     hs TnI 0hr 33 ng/L     CBC and differential [[id number]] Collected: 12/04/23 1233    Lab Status: Final result Specimen: Blood from Arm, Right Updated: 12/04/23 1243     WBC 9.63 Thousand/uL      RBC 4.23 Million/uL      Hemoglobin 13.5 g/dL      Hematocrit 41.3 %      MCV 98 fL      MCH 31.9 pg      MCHC 32.7 g/dL      RDW 14.7 %      MPV 11.3 fL      Platelets 166 Thousands/uL      nRBC 0 /100 WBCs      Neutrophils Relative 58 %      Immat GRANS % 0 %      Lymphocytes Relative 30 %      Monocytes Relative 11 %      Eosinophils Relative 1 %      Basophils Relative 0 %      Neutrophils Absolute 5.52 Thousands/µL      Immature Grans Absolute 0.04 Thousand/uL      Lymphocytes Absolute 2.90 Thousands/µL      Monocytes Absolute 1.07 Thousand/µL      Eosinophils Absolute 0.08 Thousand/µL      Basophils Absolute 0.02 Thousands/µL                    CT head without contrast   Final Result by Michael Hartman MD (12/04 9273)      No acute intracranial abnormality. Stable chronic microangiopathic changes within the brain. Workstation performed: [id number]         XR chest 1 view portable   ED Interpretation by Brittney Gómez DO (12/04 1327)   Chest x-ray shows no acute cardiopulmonary pathology as interpreted by myself pending final radiology read        Final Result by Franchesca Ng MD (12/04 8018)      No acute cardiopulmonary disease. Workstation performed: [id number]                    Procedures  Procedures         ED Course  ED Course as of 12/04/23 1539   Mon Dec 04, 2023   1326 ECG 12 lead  EKG performed at 1322: Sinus rhythm with ventricular rate of 71, normal axis, , QRS 68, QTc 460, T wave inversions noted in the inferior and lateral leads. Interpreted by me as sinus rhythm with inferior/lateral T wave inversions. When compared with prior study performed November 10, 2023, appears generally unchanged in morphology. 1327 XR chest 1 view portable  Chest x-ray shows no acute cardiopulmonary pathology as interpreted by myself pending final radiology read   1327 CBC and differential  No leukocytosis, stable hemoglobin   1328 Comprehensive metabolic panel(!)  No acute electrolyte abnormalities, impaired renal function with creatinine of 1.44 from baseline of 0.8-1.0, hyperglycemia 269, no acute LFT abnormalities   1328 HS Troponin 0hr (reflex protocol)  Elevated at 33. Will need second troponin to trend and obtain delta   1329 Review of the patient's medical record reveals 2D echo that was done January 2023 with shows LVEF of 65%   1454 HS Troponin I 2hr  Second troponin is 30 with delta of -3   1455 XR chest 1 view portable  Final radiology read is negative for acute cardiopulmonary pathology   1514 CT head without contrast  Negative for acute intracranial pathology. 1531 Results discussed. Exam generally unchanged compared to initial.  Plan will be for hospitalization. Patient is agreeable to the plan. [address] team consulted   [phone] Accepted onto admitting service for further care/management. Observation MedSurg with telemetry. Stable at the time of disposition                                             Medical Decision Making  51-year-old male presents to the ED for evaluation of recurrent syncope. Intact examination overall.   Differential includes but not limited to orthostatic hypotension, neurogenic syncope, cardiogenic syncope, dysrhythmia, hyperglycemia, hypoglycemia, acute intracranial process. Will obtain labs, urine, chest x-ray, CT head, EKG for better evaluation. Please see ED course for the remainder of my MDM    Amount and/or Complexity of Data Reviewed  Labs: ordered. Decision-making details documented in ED Course. Radiology: ordered and independent interpretation performed. Decision-making details documented in ED Course. ECG/medicine tests:  Decision-making details documented in ED Course. Risk  Decision regarding hospitalization. Disposition  Final diagnoses:   Syncope   BERNA (acute kidney injury) ([address])     Time reflects when diagnosis was documented in both MDM as applicable and the Disposition within this note       Time User Action Codes Description Comment    12/4/2023  1:35 PM Glenn Paniagua Add [R55] Syncope     12/4/2023  1:35 PM Glenn Paniagua Add [N17.9] BERNA (acute kidney injury) [facility])           ED Disposition       ED Disposition   Admit    Condition   Stable    Date/Time   Mon Dec 4, 2023  3:38 PM    Comment   Case was discussed with MANJEET and the patient's admission status was agreed to be Admission Status: observation status to the service of Dr. Yovany Hardwick . Follow-up Information    None         Patient's Medications   Discharge Prescriptions    No medications on file       No discharge procedures on file.     PDMP Review         Value Time User    PDMP Reviewed  Yes 6/25/2023  9:46 PM Farhad Kelly DO            ED Provider  Electronically Signed by             Glenn Paniagua DO  12/04/23 1667

## 2023-12-05 ENCOUNTER — APPOINTMENT (OUTPATIENT)
Dept: NON INVASIVE DIAGNOSTICS | Facility: HOSPITAL | Age: 68
DRG: 074 | End: 2023-12-05
Payer: COMMERCIAL

## 2023-12-05 ENCOUNTER — APPOINTMENT (OUTPATIENT)
Dept: CT IMAGING | Facility: HOSPITAL | Age: 68
DRG: 074 | End: 2023-12-05
Payer: COMMERCIAL

## 2023-12-05 ENCOUNTER — APPOINTMENT (INPATIENT)
Dept: NEUROLOGY | Facility: HOSPITAL | Age: 68
DRG: 074 | End: 2023-12-05
Payer: COMMERCIAL

## 2023-12-05 LAB
ANION GAP SERPL CALCULATED.3IONS-SCNC: 10 MMOL/L
AORTIC ROOT: 3.3 CM
APICAL FOUR CHAMBER EJECTION FRACTION: 64 %
ASCENDING AORTA: 3 CM
AV LVOT MEAN GRADIENT: 2 MMHG
AV LVOT PEAK GRADIENT: 5 MMHG
BUN SERPL-MCNC: 26 MG/DL (ref 5–25)
CALCIUM SERPL-MCNC: 8.5 MG/DL (ref 8.4–10.2)
CHLORIDE SERPL-SCNC: 109 MMOL/L (ref 96–108)
CO2 SERPL-SCNC: 21 MMOL/L (ref 21–32)
CREAT SERPL-MCNC: 1.23 MG/DL (ref 0.6–1.3)
DOP CALC LVOT PEAK VEL VTI: 21.43 CM
DOP CALC LVOT PEAK VEL: 1.07 M/S
E WAVE DECELERATION TIME: 191 MS
E/A RATIO: 0.92
ERYTHROCYTE [DISTWIDTH] IN BLOOD BY AUTOMATED COUNT: 14.8 % (ref 11.6–15.1)
FRACTIONAL SHORTENING: 30 (ref 28–44)
GFR SERPL CREATININE-BSD FRML MDRD: 60 ML/MIN/1.73SQ M
GLUCOSE P FAST SERPL-MCNC: 65 MG/DL (ref 65–99)
GLUCOSE SERPL-MCNC: 181 MG/DL (ref 65–140)
GLUCOSE SERPL-MCNC: 208 MG/DL (ref 65–140)
GLUCOSE SERPL-MCNC: 245 MG/DL (ref 65–140)
GLUCOSE SERPL-MCNC: 65 MG/DL (ref 65–140)
GLUCOSE SERPL-MCNC: 80 MG/DL (ref 65–140)
GLUCOSE SERPL-MCNC: 81 MG/DL (ref 65–140)
HCT VFR BLD AUTO: 37.5 % (ref 36.5–49.3)
HGB BLD-MCNC: 12 G/DL (ref 12–17)
INTERVENTRICULAR SEPTUM IN DIASTOLE (PARASTERNAL SHORT AXIS VIEW): 1.1 CM
INTERVENTRICULAR SEPTUM: 1.1 CM (ref 0.6–1.1)
LAAS-AP2: 16.7 CM2
LAAS-AP4: 18.2 CM2
LEFT ATRIUM SIZE: 4.3 CM
LEFT ATRIUM VOLUME (MOD BIPLANE): 47 ML
LEFT ATRIUM VOLUME INDEX (MOD BIPLANE): 27.2 ML/M2
LEFT INTERNAL DIMENSION IN SYSTOLE: 3.1 CM (ref 2.1–4)
LEFT VENTRICULAR INTERNAL DIMENSION IN DIASTOLE: 4.4 CM (ref 3.5–6)
LEFT VENTRICULAR POSTERIOR WALL IN END DIASTOLE: 1.2 CM
LEFT VENTRICULAR STROKE VOLUME: 51 ML
LVSV (TEICH): 51 ML
MAGNESIUM SERPL-MCNC: 1.3 MG/DL (ref 1.9–2.7)
MAGNESIUM SERPL-MCNC: 1.8 MG/DL (ref 1.9–2.7)
MCH RBC QN AUTO: 32 PG (ref 26.8–34.3)
MCHC RBC AUTO-ENTMCNC: 32 G/DL (ref 31.4–37.4)
MCV RBC AUTO: 100 FL (ref 82–98)
MV E'TISSUE VEL-SEP: 7 CM/S
MV PEAK A VEL: 1.04 M/S
MV PEAK E VEL: 96 CM/S
PLATELET # BLD AUTO: 142 THOUSANDS/UL (ref 149–390)
PMV BLD AUTO: 11.1 FL (ref 8.9–12.7)
POTASSIUM SERPL-SCNC: 4.2 MMOL/L (ref 3.5–5.3)
RBC # BLD AUTO: 3.75 MILLION/UL (ref 3.88–5.62)
RIGHT ATRIUM AREA SYSTOLE A4C: 12.2 CM2
RIGHT VENTRICLE ID DIMENSION: 3.1 CM
SL CV LEFT ATRIUM LENGTH A2C: 5.1 CM
SL CV LV EF: 60
SL CV PED ECHO LEFT VENTRICLE DIASTOLIC VOLUME (MOD BIPLANE) 2D: 89 ML
SL CV PED ECHO LEFT VENTRICLE SYSTOLIC VOLUME (MOD BIPLANE) 2D: 38 ML
SODIUM SERPL-SCNC: 140 MMOL/L (ref 135–147)
TRICUSPID ANNULAR PLANE SYSTOLIC EXCURSION: 2.1 CM
VIT B12 SERPL-MCNC: 488 PG/ML (ref 180–914)
WBC # BLD AUTO: 8.55 THOUSAND/UL (ref 4.31–10.16)

## 2023-12-05 PROCEDURE — 85027 COMPLETE CBC AUTOMATED: CPT

## 2023-12-05 PROCEDURE — 95816 EEG AWAKE AND DROWSY: CPT | Performed by: STUDENT IN AN ORGANIZED HEALTH CARE EDUCATION/TRAINING PROGRAM

## 2023-12-05 PROCEDURE — 72125 CT NECK SPINE W/O DYE: CPT

## 2023-12-05 PROCEDURE — 82948 REAGENT STRIP/BLOOD GLUCOSE: CPT

## 2023-12-05 PROCEDURE — 82607 VITAMIN B-12: CPT | Performed by: STUDENT IN AN ORGANIZED HEALTH CARE EDUCATION/TRAINING PROGRAM

## 2023-12-05 PROCEDURE — 83735 ASSAY OF MAGNESIUM: CPT

## 2023-12-05 PROCEDURE — G1004 CDSM NDSC: HCPCS

## 2023-12-05 PROCEDURE — 80048 BASIC METABOLIC PNL TOTAL CA: CPT

## 2023-12-05 PROCEDURE — 99232 SBSQ HOSP IP/OBS MODERATE 35: CPT | Performed by: INTERNAL MEDICINE

## 2023-12-05 PROCEDURE — 99223 1ST HOSP IP/OBS HIGH 75: CPT | Performed by: STUDENT IN AN ORGANIZED HEALTH CARE EDUCATION/TRAINING PROGRAM

## 2023-12-05 PROCEDURE — 97167 OT EVAL HIGH COMPLEX 60 MIN: CPT

## 2023-12-05 PROCEDURE — 93306 TTE W/DOPPLER COMPLETE: CPT | Performed by: INTERNAL MEDICINE

## 2023-12-05 PROCEDURE — 99223 1ST HOSP IP/OBS HIGH 75: CPT | Performed by: INTERNAL MEDICINE

## 2023-12-05 PROCEDURE — 70450 CT HEAD/BRAIN W/O DYE: CPT

## 2023-12-05 PROCEDURE — 95816 EEG AWAKE AND DROWSY: CPT

## 2023-12-05 PROCEDURE — 93306 TTE W/DOPPLER COMPLETE: CPT

## 2023-12-05 RX ORDER — FLUDROCORTISONE ACETATE 0.1 MG/1
0.2 TABLET ORAL 2 TIMES DAILY
Status: DISCONTINUED | OUTPATIENT
Start: 2023-12-05 | End: 2023-12-05

## 2023-12-05 RX ORDER — LANOLIN ALCOHOL/MO/W.PET/CERES
3 CREAM (GRAM) TOPICAL
Status: DISCONTINUED | OUTPATIENT
Start: 2023-12-05 | End: 2023-12-07 | Stop reason: HOSPADM

## 2023-12-05 RX ORDER — MAGNESIUM SULFATE HEPTAHYDRATE 40 MG/ML
2 INJECTION, SOLUTION INTRAVENOUS ONCE
Status: COMPLETED | OUTPATIENT
Start: 2023-12-05 | End: 2023-12-05

## 2023-12-05 RX ORDER — FLUDROCORTISONE ACETATE 0.1 MG/1
0.2 TABLET ORAL DAILY
Status: DISCONTINUED | OUTPATIENT
Start: 2023-12-06 | End: 2023-12-07 | Stop reason: HOSPADM

## 2023-12-05 RX ORDER — LANOLIN ALCOHOL/MO/W.PET/CERES
800 CREAM (GRAM) TOPICAL 2 TIMES DAILY
Status: DISCONTINUED | OUTPATIENT
Start: 2023-12-05 | End: 2023-12-07 | Stop reason: HOSPADM

## 2023-12-05 RX ORDER — MAGNESIUM SULFATE HEPTAHYDRATE 40 MG/ML
2 INJECTION, SOLUTION INTRAVENOUS ONCE
Status: DISCONTINUED | OUTPATIENT
Start: 2023-12-05 | End: 2023-12-05

## 2023-12-05 RX ADMIN — MIDODRINE HYDROCHLORIDE 15 MG: 5 TABLET ORAL at 08:31

## 2023-12-05 RX ADMIN — INSULIN LISPRO 6 UNITS: 100 INJECTION, SOLUTION INTRAVENOUS; SUBCUTANEOUS at 11:51

## 2023-12-05 RX ADMIN — Medication 800 MG: at 16:44

## 2023-12-05 RX ADMIN — ASPIRIN 81 MG CHEWABLE TABLET 81 MG: 81 TABLET CHEWABLE at 08:29

## 2023-12-05 RX ADMIN — CALCITRIOL 0.25 MCG: 0.25 CAPSULE, LIQUID FILLED ORAL at 08:34

## 2023-12-05 RX ADMIN — SERTRALINE 100 MG: 100 TABLET, FILM COATED ORAL at 08:29

## 2023-12-05 RX ADMIN — MIDODRINE HYDROCHLORIDE 15 MG: 5 TABLET ORAL at 16:44

## 2023-12-05 RX ADMIN — INSULIN GLARGINE 18 UNITS: 100 INJECTION, SOLUTION SUBCUTANEOUS at 22:09

## 2023-12-05 RX ADMIN — HYDROCORTISONE 20 MG: 5 TABLET ORAL at 08:35

## 2023-12-05 RX ADMIN — INSULIN LISPRO 1 UNITS: 100 INJECTION, SOLUTION INTRAVENOUS; SUBCUTANEOUS at 22:15

## 2023-12-05 RX ADMIN — INSULIN LISPRO 6 UNITS: 100 INJECTION, SOLUTION INTRAVENOUS; SUBCUTANEOUS at 08:30

## 2023-12-05 RX ADMIN — APIXABAN 5 MG: 5 TABLET, FILM COATED ORAL at 16:44

## 2023-12-05 RX ADMIN — INSULIN LISPRO 2 UNITS: 100 INJECTION, SOLUTION INTRAVENOUS; SUBCUTANEOUS at 16:45

## 2023-12-05 RX ADMIN — SODIUM CHLORIDE 1 G: 1 TABLET ORAL at 21:58

## 2023-12-05 RX ADMIN — SODIUM CHLORIDE 1 G: 1 TABLET ORAL at 16:44

## 2023-12-05 RX ADMIN — INSULIN LISPRO 6 UNITS: 100 INJECTION, SOLUTION INTRAVENOUS; SUBCUTANEOUS at 16:44

## 2023-12-05 RX ADMIN — INSULIN LISPRO 1 UNITS: 100 INJECTION, SOLUTION INTRAVENOUS; SUBCUTANEOUS at 11:51

## 2023-12-05 RX ADMIN — RISPERIDONE 1 MG: 1 TABLET ORAL at 08:29

## 2023-12-05 RX ADMIN — RISPERIDONE 1 MG: 1 TABLET ORAL at 16:44

## 2023-12-05 RX ADMIN — FERROUS SULFATE TAB 325 MG (65 MG ELEMENTAL FE) 325 MG: 325 (65 FE) TAB at 08:29

## 2023-12-05 RX ADMIN — SODIUM CHLORIDE 1 G: 1 TABLET ORAL at 08:29

## 2023-12-05 RX ADMIN — FLUDROCORTISONE ACETATE 0.2 MG: 0.1 TABLET ORAL at 08:34

## 2023-12-05 RX ADMIN — MAGNESIUM SULFATE HEPTAHYDRATE 2 G: 40 INJECTION, SOLUTION INTRAVENOUS at 06:53

## 2023-12-05 RX ADMIN — Medication 3 MG: at 01:19

## 2023-12-05 RX ADMIN — MIDODRINE HYDROCHLORIDE 15 MG: 5 TABLET ORAL at 11:51

## 2023-12-05 RX ADMIN — APIXABAN 5 MG: 5 TABLET, FILM COATED ORAL at 08:29

## 2023-12-05 RX ADMIN — Medication 3 MG: at 22:09

## 2023-12-05 RX ADMIN — HYDROCORTISONE 25 MG: 5 TABLET ORAL at 16:45

## 2023-12-05 RX ADMIN — ATORVASTATIN CALCIUM 10 MG: 10 TABLET, FILM COATED ORAL at 08:29

## 2023-12-05 RX ADMIN — Medication 100 MCG: at 08:29

## 2023-12-05 NOTE — ASSESSMENT & PLAN NOTE
POA Creatinine 1.44  Baseline 0.9-1.2  Improved with fluids    Plan  IV hydration as above  Avoid nephrotoxic agents

## 2023-12-05 NOTE — RAPID RESPONSE
Rapid Response Note  Heraclio Barton 79 y.o. male MRN: [id number]  Unit/Bed#: S -01 Encounter: [id number]    Rapid Response Notification(s):   Response called date/time:  12/5/2023 4:27 AM  Response team arrival date/time:  12/5/2023 4:28 AM  Response end date/time:  12/5/2023 4:41 AM  [facility]  [facility]  Rapid response location:  [facility] unit  Primary reason for rapid response call: Fall    Rapid Response Intervention(s):   Airway:  None  Breathing:  None  Circulation:  None  Fluids administered:  None  Medications administered:  None       Assessment:   Fall w/o head strike or LoC. Patient A&Ox2, knows month, reports year as 2003 which is unchanged from prior. No C spine, neck or head tenderness present on exam.    Plan:   CT head and C spine ordered - will follow up  Continue neurochecks  Rest of management per primary team.     Rapid Response Outcome:   Transfer:  Remain on floor  Code Status: Level 1 (Full Code)      Family notified of transfer: N/A  Family member contacted: Primary team to contact family. Background/Situation:   Heraclio Barton is a 79 y.o. male who presented after a syncopal episode at home. Known hx of orthostatic hypotension, adrenal insufficiency and T1DM. Patient was a rapid response at 4:28am after an unwitnessed fall. He was found sitting on the floor, reports that he fell when he went to try and get a blanket. Denies dizziness, lightheadedness, head stroke, loss of consciousness, neck or back tenderness. Review of Systems   Eyes:  Negative for visual disturbance. Respiratory:  Negative for shortness of breath. Cardiovascular:  Negative for chest pain and palpitations. Gastrointestinal:  Negative for abdominal pain. Musculoskeletal:  Negative for arthralgias, back pain, neck pain and neck stiffness. Skin:  Negative for color change and pallor.    Neurological:  Negative for dizziness, seizures, syncope, facial asymmetry, speech difficulty, weakness, light-headedness, numbness and headaches. Objective:   Vitals:    12/04/23 1830 12/04/23 2044 12/05/23 0427 12/05/23 0438   BP: 139/69 107/74 (!) 67/40 96/50   BP Location: Right arm      Pulse: 70 75 80    Resp:  18 18    Temp:  98.9 °F (37.2 °C) 98.3 °F (36.8 °C)    TempSrc:   Oral    SpO2: 100% 97% (!) 85% 100%   Weight:         Physical Exam  Vitals and nursing note reviewed. Constitutional:       General: He is not in acute distress. Appearance: Normal appearance. He is not ill-appearing, toxic-appearing or diaphoretic. HENT:      Head: Normocephalic and atraumatic. Nose: Nose normal.      Mouth/Throat:      Mouth: Mucous membranes are moist.      Pharynx: Oropharynx is clear. Eyes:      General: No scleral icterus. Right eye: No discharge. Left eye: No discharge. Extraocular Movements: Extraocular movements intact. Conjunctiva/sclera: Conjunctivae normal.   Cardiovascular:      Rate and Rhythm: Normal rate and regular rhythm. Pulses: Normal pulses. Heart sounds: Normal heart sounds. No murmur heard. Pulmonary:      Effort: Pulmonary effort is normal. No respiratory distress. Breath sounds: Normal breath sounds. No wheezing, rhonchi or rales. Abdominal:      General: Abdomen is flat. Bowel sounds are normal. There is no distension. Palpations: Abdomen is soft. Tenderness: There is no abdominal tenderness. There is no guarding or rebound. Musculoskeletal:         General: No swelling. Cervical back: Normal range of motion and neck supple. Skin:     General: Skin is warm and dry. Coloration: Skin is not jaundiced or pale. Neurological:      Mental Status: He is alert. Mental status is at baseline. Psychiatric:         Mood and Affect: Mood normal.         Behavior: Behavior normal.         Thought Content:  Thought content normal.         Judgment: Judgment normal.         Portions of the record may have been created with voice recognition software. Occasional wrong word or "sound a like" substitutions may have occurred due to the inherent limitations of voice recognition software. Read the chart carefully and recognize, using context, where substitutions have occurred.     Lillie Mason MD

## 2023-12-05 NOTE — ASSESSMENT & PLAN NOTE
Continue PTA hydrocortisone 20 mg AM, 15 mg PM  Continue PTA fludrocortisone 0.2 mg daily  Consider acute adrenal insufficiency work-up if no improvement

## 2023-12-05 NOTE — UTILIZATION REVIEW
Initial Clinical Review    Observation 12/4 1538 changed to inpatient 12/5 1208. Pt requiring continued stay for EEG. Admission: Date/Time/Statement:   Admission Orders (From admission, onward)       Ordered        12/05/23 1208  Inpatient Admission  Once            12/04/23 1538  Place in Observation  Once                          Orders Placed This Encounter   Procedures    Inpatient Admission     Standing Status:   Standing     Number of Occurrences:   1     Order Specific Question:   Level of Care     Answer:   Med Surg [16]     Order Specific Question:   Estimated length of stay     Answer:   More than 2 Midnights     Order Specific Question:   Certification     Answer:   I certify that inpatient services are medically necessary for this patient for a duration of greater than two midnights. See H&P and MD Progress Notes for additional information about the patient's course of treatment. ED Arrival Information       Expected   -    Arrival   12/4/2023 12:03    Acuity   Urgent              Means of arrival   Wheelchair    Escorted by   Family Member    Service   Hospitalist    Admission type   Emergency              Arrival complaint   Hypotension; Difficulty Walking             Chief Complaint   Patient presents with    Weakness - Generalized     Pt presents to the ED from home with reports of hypotension and severe weakness. Reported near syncopal episode x2 while at doctors office. Initial Presentation: 79 y.o. male with a PMH of Afib on Eliquis, chronic orthostatic hypotension, adrenal insufficiency, T1DM who presents after a syncopal episode earlier this morning while his caregiver was getting him ready for a doctor's appointment. Diaphoresis, blue-colored lips, and urinary incontinence were noted. . No tonic-clonic jerking movements were seen, but when the patient regained consciousness, he had a blank stare and was generally unresponsive to family's questioning.  However, he has been having urinary incontinence for approximately 1 week. He was seen by PCP this AM, and was referred to Urology. He has not eaten anything since breakfast this morning, but his blood sugars are in the 200s-300s per Dexcom. Per patient's daughter, patient has been having syncopal episodes for the past several weeks, and was hospitalized 1 month ago for the same presentation. At the time, no changes to regimen were made except for sodium tablets, and patient was discharged home. The syncopal episode was attributed to his orthostatic hypotension with a component of adrenal insufficiency and autonomic dysfunction. Plan: Observation for syncope, BERNA with creatinine of 1.44 (baseline 0.9-1.2), hx of stroke, Type 1 DM, Afib, orthostatic hypotension, adrenal insufficiency: continue midodrine, hydrocortisone, fludrocortisone, IV fluids, orthostatic vitals, telemetry, Echo, Neurology consult, PT/OT eval, continue ASA and Eliquis, Toujeo 20 units at bedtime, lispro 6 units at mealtimes. 12/5 Observation changed to inpatient. Neurology consult: Routine EEG, fall precautions, telemetry, PT/OT eval, continue ASA and statin, Eliquis, positive orthostataic vitals- currently on Florinef and Midodrine, recommend thigh high NATALIO stockings. Date: 12/6    Day 3: Has surpassed a 2nd midnight with active treatments and services, which include EEG.       ED Triage Vitals   Temperature Pulse Respirations Blood Pressure SpO2   12/04/23 1212 12/04/23 1212 12/04/23 1212 12/04/23 1212 12/04/23 1212   98.3 °F (36.8 °C) 74 16 125/61 100 %      Temp Source Heart Rate Source Patient Position - Orthostatic VS BP Location FiO2 (%)   12/04/23 1212 12/04/23 1212 12/04/23 1212 12/04/23 1212 --   Oral Monitor Sitting Right arm       Pain Score       12/04/23 1941       No Pain          Wt Readings from Last 1 Encounters:   12/05/23 67.6 kg (149 lb)     Additional Vital Signs:     Date/Time Temp Pulse Resp BP MAP (mmHg) SpO2 O2 Device Patient Position - Orthostatic VS   12/05/23 07:41:13 97.5 °F (36.4 °C) 76 -- 102/63 76 98 % -- --   12/05/23 04:38:29 -- -- -- 96/50 -- 100 % -- --   12/05/23 04:27:27 98.3 °F (36.8 °C) 80 18 67/40 Abnormal  -- 85 % Abnormal  None (Room air) --   12/04/23 2044 98.9 °F (37.2 °C) 75 18 107/74 85 97 % -- --   12/04/23 1830 -- 70 -- 139/69 98 100 % None (Room air) Lying   12/04/23 1550 -- 85 20 93/55 -- 99 % None (Room air) Standing - Orthostatic VS   12/04/23 1547 -- 72 17 96/51 -- -- -- Sitting - Orthostatic VS   12/04/23 1540 -- 72 17 132/62 -- -- -- Lying - Orthostatic VS     Pertinent Labs/Diagnostic Test Results:   CT head wo contrast   Final Result by Gabriella Gabriel MD (12/05 7591)      No acute intracranial abnormality. Workstation performed: [id number]         CT spine cervical wo contrast   Final Result by Gabriella Gabriel MD (60/06 2780)      No cervical spine fracture or traumatic malalignment. Workstation performed: [id number]         CT head without contrast   Final Result by Goran Gonzalez MD (12/04 0955)      No acute intracranial abnormality. Stable chronic microangiopathic changes within the brain. Workstation performed: [id number]         XR chest 1 view portable   ED Interpretation by Hossein Javier DO (12/04 1327)   Chest x-ray shows no acute cardiopulmonary pathology as interpreted by myself pending final radiology read        Final Result by Zaida Lomeli MD (12/04 8063)      No acute cardiopulmonary disease.                   Workstation performed: [id number]           12/4 EKG:  Sinus rhythm with Premature atrial complexes  T wave abnormality, consider inferolateral ischemia  Prolonged QT  Abnormal ECG      Results from last 7 days   Lab Units 12/05/23  0503 12/04/23  1233   WBC Thousand/uL 8.55 9.63   HEMOGLOBIN g/dL 12.0 13.5   HEMATOCRIT % 37.5 41.3   PLATELETS Thousands/uL 142* 169   NEUTROS ABS Thousands/µL  --  5.52         Results from last 7 days   Lab Units 12/05/23  0503 12/04/23  1233   SODIUM mmol/L 140 137   POTASSIUM mmol/L 4.2 4.5   CHLORIDE mmol/L 109* 104   CO2 mmol/L 21 23   ANION GAP mmol/L 10 10   BUN mg/dL 26* 27*   CREATININE mg/dL 1.23 1.44*   EGFR ml/min/1.73sq m 60 50   CALCIUM mg/dL 8.5 9.0   MAGNESIUM mg/dL 1.3*  --      Results from last 7 days   Lab Units 12/04/23  1233   AST U/L 15   ALT U/L 12   ALK PHOS U/L 88   TOTAL PROTEIN g/dL 7.1   ALBUMIN g/dL 4.2   TOTAL BILIRUBIN mg/dL 0.60     Results from last 7 days   Lab Units 12/05/23  1151 12/05/23  0737 12/05/23  0428 12/04/23  2050   POC GLUCOSE mg/dl 208* 80 81 270*     Results from last 7 days   Lab Units 12/05/23  0503 12/04/23  1233   GLUCOSE RANDOM mg/dL 65 269*         Results from last 7 days   Lab Units 12/04/23  1719 12/04/23  1419 12/04/23  1233   HS TNI 0HR ng/L  --   --  33   HS TNI 2HR ng/L  --  30  --    HSTNI D2 ng/L  --  -3  --    HS TNI 4HR ng/L 45  --   --    HSTNI D4 ng/L 12  --   --            Results from last 7 days   Lab Units 12/04/23  1552   CLARITY UA  Clear   COLOR UA  Light Yellow   SPEC GRAV UA  1.013   PH UA  6.0   GLUCOSE UA mg/dl 200 (1/5%)*   KETONES UA mg/dl Negative   BLOOD UA  Negative   PROTEIN UA mg/dl Trace*   NITRITE UA  Negative   BILIRUBIN UA  Negative   UROBILINOGEN UA (BE) mg/dl <2.0   LEUKOCYTES UA  Negative   WBC UA /hpf 2-4*   RBC UA /hpf None Seen   BACTERIA UA /hpf None Seen   EPITHELIAL CELLS WET PREP /hpf Occasional         ED Treatment:   Medication Administration from 12/04/2023 1203 to 12/04/2023 1923         Date/Time Order Dose Route Action     12/04/2023 1419 EST sodium chloride 0.9 % bolus 1,000 mL 1,000 mL Intravenous New Bag          Past Medical History:   Diagnosis Date    A-fib [facility])     Adrenal insufficiency (HCC)     Atrial fibrillation (HCC)     Diabetes mellitus ([address])     Obesity, morbid ([address]) 11/14/2022    Stroke ([address])      Present on Admission:   Orthostatic hypotension   Adrenal insufficiency (HCC)   Paroxysmal atrial fibrillation (HCC)   Syncope   BERNA (acute kidney injury) ([address] [facility])   T1DM (type 1 diabetes mellitus) ([address] [facility])      Admitting Diagnosis: Syncope [R55]  Weakness [R53.1]  Hypotension [I95.9]  Difficulty walking [R26.2]  BERNA (acute kidney injury) ([address] [facility]) [N17.9]  Age/Sex: 79 y.o. male  Admission Orders:  Scheduled Medications:  apixaban, 5 mg, Oral, BID  aspirin, 81 mg, Oral, Daily  atorvastatin, 10 mg, Oral, Daily  calcitriol, 0.25 mcg, Oral, Daily  cyanocobalamin, 100 mcg, Oral, Daily  ferrous sulfate, 325 mg, Oral, Daily With Breakfast  fludrocortisone, 0.2 mg, Oral, BID  hydrocortisone, 15 mg, Oral, Daily  hydrocortisone, 20 mg, Oral, QAM  insulin glargine, 18 Units, Subcutaneous, HS  insulin lispro, 1-5 Units, Subcutaneous, TID AC  insulin lispro, 1-5 Units, Subcutaneous, HS  insulin lispro, 6 Units, Subcutaneous, TID With Meals  melatonin, 3 mg, Oral, HS  midodrine, 15 mg, Oral, TID AC  risperiDONE, 1 mg, Oral, BID  sertraline, 100 mg, Oral, Daily  sodium chloride, 1 g, Oral, TID      Continuous IV Infusions:  sodium chloride, 100 mL/hr, Intravenous, Continuous      PRN Meds:  acetaminophen, 650 mg, Oral, Q6H PRN  ondansetron, 4 mg, Intravenous, Q6H PRN        IP CONSULT TO NEUROLOGY  IP CONSULT TO ENDOCRINOLOGY    Network Utilization Review Department  ATTENTION: Please call with any questions or concerns to [phone] and carefully listen to the prompts so that you are directed to the right person. All voicemails are confidential.   For Discharge needs, contact Care Management DC Support Team at [phone] opt. 2  Send all requests for admission clinical reviews, approved or denied determinations and any other requests to dedicated fax number below belonging to the campus where the patient is receiving treatment.  List of dedicated fax numbers for the Facilities:  [place] DENIALS (Administrative/Medical Necessity) [phone]   DISCHARGE SUPPORT TEAM ([address] [place] [address]) [phone] 4297 [facility] (Maternity/NICU/Pediatrics) [address] [facility] [address] [facility] [phone] [address] [address] [facility] [address] [phone]

## 2023-12-05 NOTE — ASSESSMENT & PLAN NOTE
Syncopal episode the morning of this admission. No head strike. Noted to be diaphoretic, bluish colored lips, and urinary incontinence. Reportedly regained consciousness within 1 minute, but blank stare and slurred speech were noted over the next hour. Additional episodes of blank stare later in the day prior to arrival in ED. Notably, hospitalized for similar symptoms after syncopal episode on 11/10/2023. PMH of orthostatic hypotension (on midodrine), T1DM, and adrenal insufficiency (on hydrocortisone and fludrocortisone)  Orthostatic vitals positive in the /62 lying, 96/51 sitting  CXR - no acute cardiopulmonary disease  CT head - no acute abnormalities, stable microangiopathic changes  ECG - NSR, rate 71, T wave inversions in inferior and lateral leads  Etiology: orthostatic vs cardiogenic syncope. Possible component of adrenal insufficiency or autonomic dysfunction secondary to T1DM.  Less suspicion for TIA or seizure     Plan  Continue PTA midodrine  Continue hydrocortisone 25 mg BID and fludrocortisone 0.2 mg daily   mL/hr infusion  Monitor on telemetry  Echocardiogram ordered  If no further improvement, consider acute adrenal insufficiency workup  PT/OT eval and treat  Neurology consulted, appreciate recommendations  If syncopal episode , check STAT prolactin within 15 minutes

## 2023-12-05 NOTE — ASSESSMENT & PLAN NOTE
Lab Results   Component Value Date    HGBA1C 8.8 (H) 11/11/2023       Recent Labs     12/05/23  0428 12/05/23  0737 12/05/23  1151 12/05/23  1614   POCGLU 81 80 208* 245*         Blood Sugar Average: Last 72 hrs:  (P) 176.8    PTA Toujeo 20 units at bedtime, lispro 6 units at mealtimes  Brittle diabetes    Plan  Continue home regimen  Do not hold basal insulin  SSI  Accuchecks  Hypoglycemia protocol  Carbohydrate controlled diet

## 2023-12-05 NOTE — CONSULTS
Consultation - Neurology   Michelle Lawrence 79 y.o. male MRN: [id number]  Unit/Bed#: S -01 Encounter: [id number]      Assessment/Plan     Syncope  Assessment & Plan  78 y/o male with prior stroke on aspirin, Afib on Eliquis, DM, obesity, history of orthostatic hypotension, who presented to the ED on 12/4 after having a syncopal episode at home consisting of becoming diaphoretic, cyanotic lips, and urinary incontinence that was witnessed by patient's caregiver. No seizure-like activity was noted, but when patient regained consciousness, he had a blank stare and was not responding to questions. Of note, patient has been having urinary incontinence x 1 week and was referred to Urology by his PCP. Family reports he has been having multiple syncopal episodes over the last several weeks. Neurology consulted for further evaluation. Of note, patient noted to have positive ortho BPs and also an episode of hypotension with BP 67/40 and hypoxia 85%. Plan:  - Routine EEG  - Check prolactin STAT within 15 minutes of event occurring if patient has recurrence while admitted  - Fall precautions  - No need for [employer] form as patient does not drive  - Telemetry  - PT/OT  - Monitor neuro exam; notify with any changes  - Medical management and supportive care per primary team. Correction of any metabolic or infectious disturbances. Results:  - CT head: No acute intracranial abnormality. Stable chronic microangiopathic changes within the brain.   - Repeat CT head: No acute intracranial abnormality.  - Echo: EF 60%. No regional wall motion abnormality.  Bilateral atrium size normal.  - Labs: creatinine on presentation 1.44  - Ortho BPs: lying 132/62, sitting 96/51, standing 93/55    BERNA (acute kidney injury) ([address])  Assessment & Plan  - Creatinine on presentation 1.44  - Medical management per primary team    History of stroke  Assessment & Plan  - Continue on aspirin and statin  - Monitor neuro exam; notify with any changes    Paroxysmal atrial fibrillation (HCC)  Assessment & Plan  - Continue Eliquis 5 mg BID  - Telemetry  - Medical management per primary team    Orthostatic hypotension  Assessment & Plan  - See ortho BPs as noted above  - Currently on Florinef and Midodrine  - Recommend thigh-high NATALIO stockings  - Medical management per primary team      Recommendations for outpatient neurological follow up have yet to be determined. Case and treatment plan reviewed with attending neurologist, Dr. Ruben Hollingsworth. Please see attending attestation for any further recommendations. History of Present Illness     Reason for Consult / Principal Problem: Syncope  Hx and PE limited by: Poor historian  HPI: Merlyn Hood is a 79 y.o.  male with prior stroke on aspirin, Afib on Eliquis, DM, obesity, history of orthostatic hypotension, who presents with syncope. Patient is a poor historian. He states he was getting up from the toilet yesterday when he felt dizzy and then passed out. He remembers waking up on the bathroom floor and his friend was with him. He remembers them asking him questions and he was able to answer some of them. He denies any weakness, numbness/tingling, vision changes, headaches. He states that he does not drive. Patient presented to the ED on 12/4 for evaluation of multiple syncopal episodes over the last several weeks. Patient had a syncopal episode yesterday while getting ready for his doctor's appointment. Caregiver noted that patient was diaphoretic, had cyanotic lips and urinary incontinence. No seizure-like activity was noted. Patient regained consciousness but then was noted to have a blank stare and was unresponsive to his family's questions. Of note, he has been having urinary incontinence x 1 week. He had been evaluated by his PCP, who referred him to Urology.  Patient's daughter reports that patient has been having syncopal episodes for the last several weeks with one of them requiring hospitalization last month. Overnight, patient was a RR for an unwitnessed fall. Patient was found sitting on the floor, reporting he fell when he tried to get a blanket. Patient had an admission 11/10-11/14 after a syncopal episode after standing from the toilet. This was suspected to be secondary to orthostatic hypotension as he was found to have positive ortho BPs that admission. He was seen by his PCP yesterday for urinary incontinence. His symptoms were reported to be worse when walking and when his BP was low. Patient was evaluated by neurology during an admission in 11/2022 for new stroke. Patient was admitted for hypotension in the setting of adrenal insufficiency and imaging for evaluation of headache subsequently demonstrated subacute to chronic infarct in the R basal ganglia. Infarct was suspected to be chronic, therefore, patient was continued on aspirin and Eliquis. He was then evaluated in the office by neurology in 03/2023 and at that time, family reported "over the last several months patient has had daily episodes of blank staring that occurs with both standing and sitting. During these events patient will clench his fist, arches his back and become unresponsive for several seconds to up to a minute. Daughter states that these events started occurring ever since the stroke several months ago back in October 2022. States that he has associated urinary incontinence and syncopal episodes with these spells intermittently. Daughter also notes that approximately 2 weeks ago patient had a extended episode that was different from his typical daily events consisting of dysarthria lasting around 30 minutes. Patient returned to baseline following event. " He was recommended to complete ambulatory EEG for further evaluation, which does not appear to be completed yet. Inpatient consult to Neurology  Consult performed by:  NACHO Bell  Consult ordered by: Marquise Espinosa MD          Review of Systems  A 12 system ROS was completed. Other than the above mentioned complaints in the HPI and those commented on below, all remaining systems were negative. Historical Information   Past Medical History:   Diagnosis Date    A-fib [facility])     Adrenal insufficiency (HCC)     Atrial fibrillation (HCC)     Diabetes mellitus ([address] [facility])     Obesity, morbid ([address] [facility]) 11/14/2022    Stroke [facility])      History reviewed. No pertinent surgical history. Social History   Social History     Substance and Sexual Activity   Alcohol Use Not Currently    Alcohol/week: 5.0 standard drinks of alcohol    Types: 5 Cans of beer per week    Comment: Quit in august 2022     Social History     Substance and Sexual Activity   Drug Use Never     E-Cigarette/Vaping    E-Cigarette Use Never User      E-Cigarette/Vaping Substances    Nicotine No     THC No     CBD No     Flavoring No     Other No     Unknown No      Social History     Tobacco Use   Smoking Status Former    Packs/day: 0.25    Years: 30.00    Total pack years: 7.50    Types: Cigarettes   Smokeless Tobacco Never     Family History:   Family History   Problem Relation Age of Onset    Heart disease Mother     Cancer Father     Multiple sclerosis Sister        Review of previous medical records was completed.     Meds/Allergies   all current active meds have been reviewed, current meds:   Current Facility-Administered Medications   Medication Dose Route Frequency    acetaminophen (TYLENOL) tablet 650 mg  650 mg Oral Q6H PRN    apixaban (ELIQUIS) tablet 5 mg  5 mg Oral BID    aspirin chewable tablet 81 mg  81 mg Oral Daily    atorvastatin (LIPITOR) tablet 10 mg  10 mg Oral Daily    calcitriol (ROCALTROL) capsule 0.25 mcg  0.25 mcg Oral Daily    cyanocobalamin (VITAMIN B-12) tablet 100 mcg  100 mcg Oral Daily    ferrous sulfate tablet 325 mg  325 mg Oral Daily With Breakfast    fludrocortisone (FLORINEF) tablet 0.2 mg  0.2 mg Oral BID    hydrocortisone (CORTEF) tablet 15 mg  15 mg Oral Daily hydrocortisone (CORTEF) tablet 20 mg  20 mg Oral QAM    insulin glargine (LANTUS) subcutaneous injection 18 Units 0.18 mL  18 Units Subcutaneous HS    insulin lispro (HumaLOG) 100 units/mL subcutaneous injection 1-5 Units  1-5 Units Subcutaneous TID AC    insulin lispro (HumaLOG) 100 units/mL subcutaneous injection 1-5 Units  1-5 Units Subcutaneous HS    insulin lispro (HumaLOG) 100 units/mL subcutaneous injection 6 Units  6 Units Subcutaneous TID With Meals    melatonin tablet 3 mg  3 mg Oral HS    midodrine (PROAMATINE) tablet 15 mg  15 mg Oral TID AC    ondansetron (ZOFRAN) injection 4 mg  4 mg Intravenous Q6H PRN    risperiDONE (RisperDAL) tablet 1 mg  1 mg Oral BID    sertraline (ZOLOFT) tablet 100 mg  100 mg Oral Daily    sodium chloride 0.9 % infusion  100 mL/hr Intravenous Continuous    sodium chloride tablet 1 g  1 g Oral TID   , and PTA meds:   Prior to Admission Medications   Prescriptions Last Dose Informant Patient Reported? Taking? Continuous Blood Gluc  (Dexcom G7 ) CYN   No No   Sig: Use 1 each continuous   Continuous Blood Gluc Sensor (Dexcom G7 Sensor)   No No   Sig: Use 1 Device every 10 days   Insulin Pen Needle (BD Pen Needle Josie 2nd Gen) 32G X 4 MM MISC   No No   Sig: For use with insulin pen 4 times daily. Pharmacy may dispense brand covered by insurance. Insulin Pen Needle (BD Pen Needle Josie 2nd Gen) 32G X 4 MM MISC   No No   Sig: FOR USE WITH INSULIN PEN. PHARMACY MAY DISPENSE BRAND COVERED BY INSURANCE.   apixaban (Eliquis) 5 mg 12/4/2023  No Yes   Sig: Take 1 tablet (5 mg total) by mouth 2 (two) times a day   aspirin 81 mg chewable tablet 12/4/2023  Yes Yes   Sig: Chew 81 mg daily   atorvastatin (LIPITOR) 10 mg tablet 12/4/2023 Child Yes Yes   Sig: Take 1 tablet by mouth daily   calcitriol (ROCALTROL) 0.25 mcg capsule 12/4/2023  No Yes   Sig: Take 1 capsule (0.25 mcg total) by mouth daily Do not start before November 15, 2023.    cyanocobalamin (VITAMIN B-12) 100 MCG tablet 2023  No Yes   Sig: Take 1 tablet (100 mcg total) by mouth daily Do not start before November 15, 2023.    ergocalciferol (VITAMIN D2) 50,000 units Not Taking  No No   Sig: Take 1 capsule (50,000 Units total) by mouth once a week   Patient not taking: Reported on 2023   ferrous sulfate 324 (65 Fe) mg 2023  No Yes   Sig: TAKE 1 TABLET (324 MG TOTAL) BY MOUTH DAILY BEFORE BREAKFAST   fludrocortisone (FLORINEF) 0.1 mg tablet 2023  No Yes   Sig: Take 2 tablets (0.2 mg total) by mouth daily   hydrocortisone (CORTEF) 5 mg tablet 2023  No Yes   Sig: Use 4tabs (20mg) in morning and 3 tabs (15mg) daily afternoon   insulin glargine (Toujeo Max SoloStar) 300 units/mL CONCENTRATED U-300 injection pen (2-unit dial)   No No   Sig: Inject 20 Units under the skin daily at bedtime   Patient taking differently: Inject 18 Units under the skin daily at bedtime   insulin lispro (HumaLOG KwikPen) 100 units/mL injection pen 2023  No Yes   Siu tidac plus 1u/50 above 150mg/dL; max dose 40u/day   midodrine (PROAMATINE) 10 MG tablet 2023  No Yes   Sig: Take 1.5 tablets (15 mg total) by mouth 3 (three) times a day before meals   polyethylene glycol (GOLYTELY) 4000 mL solution   No No   Sig: Take 4,000 mL by mouth once for 1 dose   potassium chloride (Klor-Con) 10 mEq tablet  Child Yes No   Sig: Take 20 mEq by mouth 2 (two) times a day   risperiDONE (RisperDAL) 1 mg tablet 2023 Child Yes Yes   Sig: Take 1 tablet by mouth 2 (two) times a day   sertraline (ZOLOFT) 100 mg tablet 2023 Child Yes Yes   Sig: Take 100 mg by mouth daily   sodium chloride 1 g tablet 2023  No Yes   Sig: Take 1 tablet (1 g total) by mouth 3 (three) times a day      Facility-Administered Medications: None       Allergies   Allergen Reactions    Imipramine Other (See Comments)    Lisinopril Other (See Comments)    Paroxetine Other (See Comments)    Penicillins Hives    Rosiglitazone Other (See Comments) Troglitazone Other (See Comments)       Objective   Vitals:Blood pressure 111/55, pulse 86, temperature 97.5 °F (36.4 °C), resp. rate 18, height 5' 4" (1.626 m), weight 67.6 kg (149 lb), SpO2 98 %. ,Body mass index is 25.58 kg/m². Intake/Output Summary (Last 24 hours) at 12/5/2023 1307  Last data filed at 12/5/2023 1024  Gross per 24 hour   Intake 1240 ml   Output 600 ml   Net 640 ml       Invasive Devices: Invasive Devices       Peripheral Intravenous Line  Duration             Peripheral IV 12/05/23 Distal;Dorsal (posterior); Left Forearm <1 day                    Physical Exam  Vitals and nursing note reviewed. Constitutional:       General: He is not in acute distress. Appearance: Normal appearance. He is not ill-appearing. HENT:      Head: Normocephalic. Mouth/Throat:      Mouth: Mucous membranes are moist.      Pharynx: Oropharynx is clear. Eyes:      General: No scleral icterus. Right eye: No discharge. Left eye: No discharge. Extraocular Movements: Extraocular movements intact and EOM normal.      Conjunctiva/sclera: Conjunctivae normal.   Cardiovascular:      Rate and Rhythm: Normal rate. Pulmonary:      Effort: Pulmonary effort is normal. No respiratory distress. Musculoskeletal:         General: Normal range of motion. Cervical back: Normal range of motion. Skin:     General: Skin is warm and dry. Coloration: Skin is not jaundiced or pale. Neurological:      Mental Status: He is alert and oriented to person, place, and time. Coordination: Finger-Nose-Finger Test normal.   Psychiatric:         Mood and Affect: Mood normal.       Neurologic Exam     Mental Status   Oriented to person, place, and time. Level of consciousness: alert  Able to state he is at [facility] and that it is December. States the year is 2003. Able to state the current [place]OT[place] and most recent prior POTUS but unable to name the one prior to that.  Able to name objects correctly. No dysarthria noted. Able to follow simple and crossover commands. Cranial Nerves     CN II   Right visual field deficit: none  Left visual field deficit: none     CN III, IV, VI   Extraocular motions are normal.     CN V   Facial sensation intact. CN VII   Facial expression full, symmetric. CN VIII   Hearing: intact    CN IX, X   Palate: symmetric    CN XI   CN XI normal.     CN XII   CN XII normal.     Motor Exam   Muscle bulk: normal  Bilateral UE strength 5/5 deltoids, biceps, triceps, hand   Bilateral LE strength 5/5 hip flexion, dorsiflexion, plantar flexion     Sensory Exam   Light touch normal.     Gait, Coordination, and Reflexes     Coordination   Finger to nose coordination: normal    Tremor   Resting tremor: absent  Intention tremor: absent      Lab Results: I have personally reviewed pertinent reports.   , CBC:   Results from last 7 days   Lab Units 12/05/23  0503 12/04/23  1233   WBC Thousand/uL 8.55 9.63   RBC Million/uL 3.75* 4.23   HEMOGLOBIN g/dL 12.0 13.5   HEMATOCRIT % 37.5 41.3   MCV fL 100* 98   PLATELETS Thousands/uL 142* 169   , BMP/CMP:   Results from last 7 days   Lab Units 12/05/23  0503 12/04/23  1233   SODIUM mmol/L 140 137   POTASSIUM mmol/L 4.2 4.5   CHLORIDE mmol/L 109* 104   CO2 mmol/L 21 23   BUN mg/dL 26* 27*   CREATININE mg/dL 1.23 1.44*   CALCIUM mg/dL 8.5 9.0   AST U/L  --  15   ALT U/L  --  12   ALK PHOS U/L  --  88   EGFR ml/min/1.73sq m 60 50   , Vitamin B12:   , HgBA1C:   , TSH:   , Coagulation:   , Lipid Profile:   , Ammonia:   , Urinalysis:   Results from last 7 days   Lab Units 12/04/23  1552   COLOR UA  Light Yellow   CLARITY UA  Clear   SPEC GRAV UA  1.013   PH UA  6.0   LEUKOCYTES UA  Negative   NITRITE UA  Negative   GLUCOSE UA mg/dl 200 (1/5%)*   KETONES UA mg/dl Negative   BILIRUBIN UA  Negative   BLOOD UA  Negative   , Drug Screen:   , Medication Drug Levels:       Invalid input(s): "CARBAMAZEPINE", "LACOSAMIDE", "OXCARBAZEPINE"    Imaging Studies: I have personally reviewed pertinent reports. and I have personally reviewed pertinent films in PACS  EKG, Pathology, and Other Studies: I have personally reviewed pertinent reports.     VTE Prophylaxis: Eliquis    Code Status: Level 1 - Full Code  Advance Directive and Living Will:      Power of :    POLST:

## 2023-12-05 NOTE — NURSING NOTE
While doing hourly rounds, Primary RN went to check on patient - mckenna was alarming low O2. RN found patient on the floor at the bedside sitting up. Patient stated he was going to get a blanket and slid to his bottom. Unwitnessed fall and patient is on blood thinners, RRT called per protocol. Patient stated he did not hit his head, but patient is a poor historian and A&Ox2-3. Patient admitted with syncope and positive orthostatic BP's. STAT Ct Head and C-Spine ordered. Patient now resting comfortably in bed, with chair alarm under body, and call bed in hand.

## 2023-12-05 NOTE — ASSESSMENT & PLAN NOTE
Rolly Hamm is a 79 y.o. male with prior stroke on aspirin, Afib on Eliquis, DM, obesity, history of orthostatic hypotension, who presented to the ED on 12/4 after having a syncopal episode at home consisting of becoming diaphoretic, cyanotic lips, and urinary incontinence that was witnessed by patient's caregiver. No seizure-like activity was noted, but when patient regained consciousness, he had a blank stare and was not responding to questions. Of note, patient has been having urinary incontinence x 1 week and was referred to Urology by his PCP. Family reports he has been having multiple syncopal episodes over the last several weeks. Neurology consulted for further evaluation. Of note, patient noted to have positive ortho BPs and also an episode of hypotension with BP 67/40 and hypoxia 85%. Workup:  - CT head:   "No acute intracranial abnormality. Stable chronic microangiopathic changes within the brain."  - Repeat CT head:   "No acute intracranial abnormality. "  - Echo: EF 60%. No regional wall motion abnormality. Bilateral atrium size normal.  - Creatinine elevated on presentation, 1.44  - Ortho BPs: lying 132/62, sitting 96/51, standing 93/55  - Routine EEG:  "This abnormal study is consistent with a very mild generalized non-specific encephalopathy. No electrographic seizures, interictal epileptiform discharges (seizure tendency) or focal slowing were seen. No diagnostic clinical events were captured."    Semiology of reported episode of LOC fits with syncope rather than seizure. Prior seizure work-up unremarkable. Routine EEG this admission unremarkable for evidence of electrographic seizure or epileptiform discharges. Etiology of reported staring spells remains unclear. Patient will likely need a prolonged ambulatory EEG or future inpatient EMU admission.     Plan:  - No further neuroimaging/testing needed at this time  - Ambulatory 48-hour EEG ordered in March 2023, recommend completing the study prior to follow-up with outpatient neurology  - Check prolactin STAT within 15 minutes of event occurring if patient has recurrence while admitted  - Fall precautions  - No need for [employer] form as patient does not drive  - Telemetry  - PT/OT  - Monitor neuro exam; notify with any changes  - Medical management and supportive care per primary team. Correction of any metabolic or infectious disturbances.

## 2023-12-05 NOTE — OCCUPATIONAL THERAPY NOTE
Occupational Therapy Evaluation     Patient Name: Dede ANDRADEP Date: 12/5/2023  Problem List  Active Problems:    T1DM (type 1 diabetes mellitus) ([address])    History of stroke    Syncope    BERNA (acute kidney injury) ([address])    Adrenal insufficiency (HCC)    Orthostatic hypotension    Paroxysmal atrial fibrillation [facility])    Past Medical History  Past Medical History:   Diagnosis Date    A-fib [facility])     Adrenal insufficiency (HCC)     Atrial fibrillation (HCC)     Diabetes mellitus ([address])     Obesity, morbid ([address]) 11/14/2022    Stroke [facility])      Past Surgical History  History reviewed. No pertinent surgical history. 12/05/23 1305   OT Last Visit   OT Visit Date 12/05/23   Note Type   Note type Evaluation   Pain Assessment   Pain Assessment Tool 0-10   Pain Score No Pain   Restrictions/Precautions   Weight Bearing Precautions Per Order No   Other Precautions Cognitive; Chair Alarm; Bed Alarm;Multiple lines; Fall Risk   Home Living   Type of [address] [place] [address] Two level;Bed/bath upstairs;Stairs to enter with rails;1/2 bath on main level  (5 RYAN; full flight to bed/bath)   Bathroom Shower/Tub Walk-in shower   Bathroom Toilet Standard   Bathroom Equipment Shower chair   One [address] [address] Walker   Additional Comments Pt questionable historian, reports he only stays on first floor of house   Prior Function   Level of [place] Needs assistance with ADLs; Independent with functional mobility; Needs assistance with IADLS   Lives With Significant other  (Fiance)   Receives Help From Family;Personal care attendant  (Caregiver 20 hrs/week (M-F))   IADLs Family/Friend/Other provides transportation; Family/Friend/Other provides meals; Family/Friend/Other provides medication management   Falls in the last 6 months 5 to 10  (7 falls per pt)   Vocational Retired  ()   Comments Pt questionable historian.  Reports he is (I) w/ dressing and toileting, requires A for bathing. Uses RW for mobility baseline. [place] is home with pt 24/7 per pt. Lifestyle   Autonomy Pt lives w/ fierica in a 2 level house and requires A for ADLs PTA, RW, (+) falls, (-)   Reciprocal Relationships Supportive fierica   Service to Others Retired    Intrinsic Gratification Pt enjoys watching [employer] on Viktor Gaines   Additional Pertinent History Pt admitted w/ hypotension and severe weakness. PMH includes: T1DM, hx of CVA, Orthostatic hypotension, A-fib, psychiatric disorder   Family/Caregiver Present No   Subjective   Subjective "I am just going to lay down"   ADL   Where Assessed Standing at sink   Eating Assistance 6  Modified independent   Eating Deficit Setup; Beverage management  (eating lunch sitting EOB)   Grooming Assistance 5  Supervision/Setup   Grooming Deficit Setup;Verbal cueing;Supervision/safety;Standing with assistive device;Brushing hair  (standing at sink)   UB Bathing Assistance 5  Supervision/Setup   LB Bathing Assistance 3  Moderate Assistance   UB Dressing Assistance 5  Supervision/Setup   LB Dressing Assistance 3  Moderate [address]  4  Minimal Assistance   Bed Mobility   Supine to Sit 4  Minimal assistance   Additional items Assist x 1; Increased time required;Verbal cues;LE management   Sit to Supine 5  Supervision   Additional items Assist x 1; Increased time required;Verbal cues;LE management   Additional Comments (S)  Standing EOB upon arrival. BP supine: 142/67, sitting EOB: 93/55, standing EOB 72/48   Transfers   Sit to Stand 4  Minimal assistance   Additional items Assist x 1; Increased time required; Impulsive;Verbal cues   Stand to Sit 5  Supervision   Additional items Assist x 1; Armrests; Increased time required; Impulsive;Verbal cues   Additional Comments Impulsive, cues for initiation, pacing   Functional Mobility   Functional Mobility 4  Minimal assistance   Additional Comments Short household distance to bathroom and back w/ RW and min A x 1. Short shuffling steps, frequently stepping too close to front of RW, bumping into objects, losing balance multidirectional   Additional items Rolling walker   Balance   Static Sitting Good   Dynamic Sitting Fair +   Static Standing Fair   Dynamic Standing Fair -   Activity Tolerance   Activity Tolerance Patient limited by fatigue  (cognition)   Medical Staff Made Aware Spoke to PT ALEXANDR Wallace   Nurse Made Aware yes, RN Luz Marina Hahn ok to see pt and updated on outcomes   RUE Assessment   RUE Assessment WFL   LUE Assessment   LUE Assessment WFL   Hand Function   Gross Motor Coordination Functional   Fine Motor Coordination Functional   Sensation   Light Touch No apparent deficits   Vision-Basic Assessment   Current Vision Does not wear glasses   Cognition   Overall Cognitive Status Impaired   Arousal/Participation Alert; Cooperative   Attention Attends with cues to redirect   Orientation Level Oriented to person;Disoriented to place; Disoriented to time;Disoriented to situation  (able to report month, does not know year; reports he is at [facility])   Memory Decreased short term memory;Decreased recall of recent events;Decreased recall of precautions   Following Commands Follows one step commands with increased time or repetition   Comments Pleasantly confused, flat affect. Intermittent difficulties w/ command follow. Distractible, impulsive, poor safety awareness and problem solving. Limited conversation. Assessment   Limitation Decreased ADL status; Decreased Safe judgement during ADL;Decreased cognition;Decreased endurance;Decreased self-care trans;Decreased high-level ADLs  (balance, fxnl mobility, act jennifer, fxnl reach, standing jennifer, and strength, attention to task, direction following, safety awareness, insight, pacing, problem solving, initiation, and appropriate responses , display of emotion, response time)   Prognosis Good   Assessment Pt is a 79 y.o. male seen for OT evaluation s/p admit to [address] on 12/4/2023 w/ hypotension and weakness. Prior to admission, pt was living with fiance in a 2 level house, (A) with ADLs, (A) with IADLs, (+) falls, (-) . Personal and environmental factors affecting patient at time of evaluation include current habits and behavioral patterns, limited social support, inaccessible home environment, difficulty completing ADLs, and difficulty completing IADLs. Personal factors supporting patient at time of evaluation include age, supportive fiance and caregiver, attitude towards recovery, and FFSU. Based upon this evaluation, pt is functioning below baseline. Pt will benefit from continued skilled inpatient OT to maximize safety, level of independence overall performance in ADLs, functional transfers, functional mobility to return to functional baseline/highest level of function. Goals   Patient Goals none stated due to cognition; will continue to assess   LTG Time Frame 10-14   Long Term Goal #1 see goals below   Plan   Treatment Interventions ADL retraining;Functional transfer training; Endurance training;Cognitive reorientation;Patient/family training;Equipment evaluation/education; Compensatory technique education;Continued evaluation; Energy conservation; Activityengagement   Goal Expiration Date 12/15/23   OT Treatment Day 0   OT Frequency 3-5x/wk   Discharge Recommendation   Rehab Resource Intensity Level, OT II (Moderate Resource Intensity)  (may progress to level III pending progress towards goals and level of social support available at home.)   Equipment Recommended Bedside commode   Commode Type Standard   Additional Comments  The patient's raw score on the AM-PAC Daily Activity inpatient short form is 16, standardized score is 35.96, less than 39.4. From an OT standpoint, patients at this level may benefit from d/c to Post acute rehabilitation services.    AM-[facility] Daily Activity Inpatient   Lower Body Dressing 2   Bathing 2   Toileting 2   Upper Body Dressing 3   Grooming 3   Eating 4   Daily Activity Raw Score 16   Daily Activity Standardized Score (Calc for Raw Score >=11) 35.96   AM-PAC Applied Cognition Inpatient   Following a Speech/Presentation 2   Understanding Ordinary Conversation 3   Taking Medications 2   Remembering Where Things Are Placed or Put Away 2   Remembering List of 4-5 Errands 1   Taking Care of Complicated Tasks 1   Applied Cognition Raw Score 11   Applied Cognition Standardized Score 27.03   End of Consult   Patient Position at End of Consult Bedside chair;Bed/Chair alarm activated; All needs within reach   Nurse Communication Nurse aware of consult     GOALS:    *Patient will perform grooming tasks standing at sink with (I) in order to increase overall independence and dynamic standing balance    *LB ADL with (S) using AE prn for inc'd independence with self care and decreased caregiver burden    *Toileting with (S) for clothing management and hygiene to increase hygiene/thoroughness in order to reduce caregiver burden    *ADL transfers with mod (I) for inc'd independence with ADLs/purposeful tasks    *Bed mobility- mod (I) for inc'd independence to manage own comfort and initiate EOB & OOB purposeful tasks    *Patient will increase functional mobility to and from bathroom with rolling walker with supervision to increase independence with toileting    *Patient will engage in ongoing cognitive assessment to assist with safe discharge planning/recommendations.      *Pt will consistently follow multi step directions during ADL performance w/ % accuracy to max I and safety w/ ADL performance     Augustine Richardson, OTR/L    [place] License [id number]  [address] [place], [place] [id number]

## 2023-12-05 NOTE — CONSULTS
Endocrinology Consultation  Irl [place] 79 y.o. male MRN: [id number]  Unit/Bed#: S -01 Encounter: [id number]      Assessment/Plan     Assessment: This is a 79y.o.-year-old male with longstanding type 1 diabetes, primary adrenal insufficiency, known orthostatic hypotension, who was admitted after an episode of syncope after which he was found to be staring and incontinent. CT imaging unremarkable. He is undergoing a seizure workup. 1.  Primary adrenal insufficiency  --There is no role for checking cortisol levels in patient with known primary adrenal insufficiency  --Home dose is hydrocortisone is 20 mg in the a.m., 15 mg at 2 PM.  We will increase this to 25 mg twice a day for now however doubt that patient's orthostatic episodes are related to the adrenal insufficiency  --Continue fludrocortisone 0.2 mg once daily. Do not recommend increasing to twice a day dosing.  -- If patient experiences prolonged and persistent hypotension consistent with adrenal crisis, recommend 100 mg IV hydrocortisone    2. type 1 diabetes  --Recent A1c 8.8% November  --Home regimen Toujeo U 300 18 units nightly, lispro 6 units 3 times daily before every meal.  Injected by wife or nurse, patient does not to himself. --Continue inpatient regimen of Lantus 18 units nightly and lispro 6 units 3 times daily before every meal as well as Algorithm #2 correction scale  --Hypoglycemia protocol  --Will continue to follow and make adjustments as appropriate  -- Do not hold patient's basal Lantus insulin as he is a type I diabetic and does not make his own. If he is to be n.p.o. may require slight decrease in dosing    3.   Orthostatic hypotension  -- Outpatient management per cardiology  --He has been continued on midodrine 15 mg 3 times daily and NaCl 1 g 3 times daily inpatient  -- Will check B12 levels as has previously been low and this is significantly associated with orthostasis  -- Family does not recall if he still takes B12 supplements. They do report a distant history of alcohol abuse in the past.    CC: Adrenal insufficiency consult    History of Present Illness   60-year-old male with past medical history of type 1 diabetes for 30 years, hypertension,  hyperlipidemia, A-fib, anxiety, depression, adrenal insufficiency, CAD, orthostatic hypotension, ZKA CVA. He follows with Dr. Christiano Eugene of [facility] for his diabetes and adrenal insufficiency and was last seen via telemedicine in June. Last A1c 8.8 in November. Patient is a poor historian, history primarily obtained from chart review. He denies missing any of his prescribed medications. He is not able to describe what doses and what medications he is on but does recognize them. He does report frequent syncopal and hypotensive episodes at home especially after standing. Family corroborates. Patient presented to ED yesterday 12/4 syncopal episode after getting up from toilet where he had a blank stare and was responding to questions. Has had multiple syncopal episodes urinary incontinence in the last week. Neurology is following for evaluation. He did have a rapid response last night as well for being found on the floor    For his diabetes, he takes Lantus 18 units nightly and Humalog 6 units 3 times daily before every meal.  He has been receiving this regimen inpatient along with Algorithm 2 correctional scale. For his adrenal insufficiency he takes hydrocortisone 20 mg in the morning, 15 mg in the afternoon around 2 PM, fludrocortisone 0.2 mg daily and midodrine 15 mg prior to every meal.  He also takes salt tabs 1 g 3 times a day with meals. Inpatient consult to Endocrinology     Date/Time  12/5/2023 2:54 PM     Performed by  Harvey Montanez DO   Authorized by  [facility] Hospital[facility]abiola Blue DO             Review of Systems   Reason unable to perform ROS: Patient is a poor historian. Constitutional:  Negative for activity change and appetite change. Neurological:  Positive for dizziness, syncope and weakness. Historical Information   Past Medical History:   Diagnosis Date    A-fib [facility])     Adrenal insufficiency (HCC)     Atrial fibrillation (HCC)     Diabetes mellitus ([address] [facility])     Obesity, morbid ([address] [facility]) 11/14/2022    Stroke [facility])      History reviewed. No pertinent surgical history.   Social History   Social History     Substance and Sexual Activity   Alcohol Use Not Currently    Alcohol/week: 5.0 standard drinks of alcohol    Types: 5 Cans of beer per week    Comment: Quit in august 2022     Social History     Substance and Sexual Activity   Drug Use Never     Social History     Tobacco Use   Smoking Status Former    Packs/day: 0.25    Years: 30.00    Total pack years: 7.50    Types: Cigarettes   Smokeless Tobacco Never     Family History:   Family History   Problem Relation Age of Onset    Heart disease Mother     Cancer Father     Multiple sclerosis Sister        Meds/Allergies   Current Facility-Administered Medications   Medication Dose Route Frequency Provider Last Rate Last Admin    acetaminophen (TYLENOL) tablet 650 mg  650 mg Oral Q6H PRN Prem Clarke MD        apixaban (ELIQUIS) tablet 5 mg  5 mg Oral BID Prem Clarke MD   5 mg at 12/05/23 9201    aspirin chewable tablet 81 mg  81 mg Oral Daily Prem Clarke MD   81 mg at 12/05/23 0829    atorvastatin (LIPITOR) tablet 10 mg  10 mg Oral Daily Prem Clarke MD   10 mg at 12/05/23 0365    calcitriol (ROCALTROL) capsule 0.25 mcg  0.25 mcg Oral Daily Prem Clarke MD   0.25 mcg at 12/05/23 6977    cyanocobalamin (VITAMIN B-12) tablet 100 mcg  100 mcg Oral Daily Prem Clarke MD   100 mcg at 12/05/23 7912    ferrous sulfate tablet 325 mg  325 mg Oral Daily With Breakfast Prme Clarke MD   325 mg at 12/05/23 0829    [START ON 12/6/2023] fludrocortisone (FLORINEF) tablet 0.2 mg  0.2 mg Oral Daily Sapna Armas DO        [START ON 12/6/2023] hydrocortisone (CORTEF) tablet 25 mg  25 mg Oral DAVID Balbina Fort DO Al        hydrocortisone (CORTEF) tablet 25 mg  25 mg Oral Daily Mayi Asencio DO        insulin glargine (LANTUS) subcutaneous injection 18 Units 0.18 mL  18 Units Subcutaneous HS Pierre Finnegan MD   18 Units at 12/04/23 2113    insulin lispro (HumaLOG) 100 units/mL subcutaneous injection 1-5 Units  1-5 Units Subcutaneous TID [facility] Pierre Finnegan MD   1 Units at 12/05/23 1151    insulin lispro (HumaLOG) 100 units/mL subcutaneous injection 1-5 Units  1-5 Units Subcutaneous HS Pierre Finnegan MD   3 Units at 12/04/23 2113    insulin lispro (HumaLOG) 100 units/mL subcutaneous injection 6 Units  6 Units Subcutaneous TID With Meals Pierre Finnegan MD   6 Units at 12/05/23 1151    magnesium Oxide (MAG-OX) tablet 800 mg  800 mg Oral BID Gerson White MD        melatonin tablet 3 mg  3 mg Oral HS Sheila Robledo DO   3 mg at 12/05/23 0119    midodrine (PROAMATINE) tablet 15 mg  15 mg Oral TID AC Pierre Finnegan MD   15 mg at 12/05/23 1151    ondansetron (ZOFRAN) injection 4 mg  4 mg Intravenous Q6H PRN Pierre Finnegan MD        risperiDONE (RisperDAL) tablet 1 mg  1 mg Oral BID Pierre Finnegan MD   1 mg at 12/05/23 2208    sertraline (ZOLOFT) tablet 100 mg  100 mg Oral Daily Pierre Finnegan MD   100 mg at 12/05/23 8974    sodium chloride 0.9 % infusion  100 mL/hr Intravenous Continuous Pierre Finnegan  mL/hr at 12/04/23 1959 100 mL/hr at 12/04/23 1959    sodium chloride tablet 1 g  1 g Oral TID Pierre Finnegan MD   1 g at 12/05/23 7090     Allergies   Allergen Reactions    Imipramine Other (See Comments)    Lisinopril Other (See Comments)    Paroxetine Other (See Comments)    Penicillins Hives    Rosiglitazone Other (See Comments)    Troglitazone Other (See Comments)       Objective   Vitals: Blood pressure 111/55, pulse 86, temperature 97.5 °F (36.4 °C), resp. rate 18, height 5' 4" (1.626 m), weight 67.6 kg (149 lb), SpO2 98 %.     Intake/Output Summary (Last 24 hours) at 12/5/2023 1632  Last data filed at 12/5/2023 1024  Gross per 24 hour   Intake 1240 ml   Output 600 ml   Net 640 ml     Invasive Devices       Peripheral Intravenous Line  Duration             Peripheral IV 12/05/23 Distal;Dorsal (posterior); Left Forearm <1 day                    Physical Exam  Constitutional:       General: He is not in acute distress. Appearance: Normal appearance. He is not ill-appearing, toxic-appearing or diaphoretic. HENT:      Head: Normocephalic and atraumatic. Nose: Nose normal.   Eyes:      Extraocular Movements: Extraocular movements intact. Conjunctiva/sclera: Conjunctivae normal.      Pupils: Pupils are equal, round, and reactive to light. Cardiovascular:      Rate and Rhythm: Normal rate. Pulmonary:      Effort: Pulmonary effort is normal. No respiratory distress. Abdominal:      General: There is no distension. Musculoskeletal:         General: No deformity. Right lower leg: No edema. Left lower leg: No edema. Skin:     General: Skin is warm and dry. Neurological:      General: No focal deficit present. Mental Status: He is alert. Psychiatric:         Mood and Affect: Mood normal.         Behavior: Behavior normal.         Thought Content: Thought content normal.         The history was obtained from the review of the chart, patient. Lab Results:       Lab Results   Component Value Date    WBC 8.55 12/05/2023    HGB 12.0 12/05/2023    HCT 37.5 12/05/2023     (H) 12/05/2023     (L) 12/05/2023     Lab Results   Component Value Date/Time    BUN 26 (H) 12/05/2023 05:03 AM    K 4.2 12/05/2023 05:03 AM     (H) 12/05/2023 05:03 AM    CO2 21 12/05/2023 05:03 AM    CO2 31 11/10/2023 10:39 AM    CREATININE 1.23 12/05/2023 05:03 AM    AST 15 12/04/2023 12:33 PM    ALT 12 12/04/2023 12:33 PM    TP 7.1 12/04/2023 12:33 PM    ALB 4.2 12/04/2023 12:33 PM     No results for input(s): "CHOL", "HDL", "LDL", "TRIG", "VLDL" in the last 72 hours.   No results found for: "Gerry Louder", "THEO19PDG"  POC Glucose (mg/dl)   Date Value   12/05/2023 245 (H)   12/05/2023 208 (H)   12/05/2023 80   12/05/2023 81   12/04/2023 270 (H)   11/14/2023 297 (H)   11/14/2023 186 (H)   11/13/2023 195 (H)   11/13/2023 273 (H)   11/13/2023 433 (H)       Imaging Studies: I have personally reviewed pertinent reports. Brissa Willson  Endocrinology PGY-4    Please Tigertext questions to the physician covering the "[id number]" Role. Thank you.

## 2023-12-05 NOTE — ASSESSMENT & PLAN NOTE
PTA hydrocortisone 20 mg AM, 15 mg PM  PTA fludrocortisone 0.2 mg daily  Per Endocrinology, unlikely that patient's orthostatic episodes are related to adrenal insufficiency    Plan  Endocrinology on board, appreciate recommendations  Hydrocortisone 25 mg BID  Fludrocortisone 0.2 mg daily  If patient experiences prolonged and persistent hypotension consistent with adrenal crisis, recommend 100 mg IV hydrocortisone

## 2023-12-05 NOTE — ASSESSMENT & PLAN NOTE
Continue PTA midodrine  IV hydration as above  Repeat orthostatic vitals were positive  Compression stockings and abdominal binder

## 2023-12-05 NOTE — PROGRESS NOTES
[facility]  Progress Note  Name: Tong Pollack  MRN: [id number]  Unit/Bed#: S -01 I Date of Admission: 12/4/2023   Date of Service: 12/5/2023 I Hospital Day: 0    Assessment/Plan   Syncope  Assessment & Plan  Syncopal episode the morning of this admission. No head strike. Noted to be diaphoretic, bluish colored lips, and urinary incontinence. Reportedly regained consciousness within 1 minute, but blank stare and slurred speech were noted over the next hour. Additional episodes of blank stare later in the day prior to arrival in ED. Notably, hospitalized for similar symptoms after syncopal episode on 11/10/2023. PMH of orthostatic hypotension (on midodrine), T1DM, and adrenal insufficiency (on hydrocortisone and fludrocortisone)  Orthostatic vitals positive in the /62 lying, 96/51 sitting  CXR - no acute cardiopulmonary disease  CT head - no acute abnormalities, stable microangiopathic changes  ECG - NSR, rate 71, T wave inversions in inferior and lateral leads  Etiology: orthostatic vs cardiogenic syncope. Possible component of adrenal insufficiency or autonomic dysfunction secondary to T1DM.  Less suspicion for TIA or seizure     Plan  Continue PTA midodrine  Continue hydrocortisone 25 mg BID and fludrocortisone 0.2 mg daily   mL/hr infusion  Monitor on telemetry  Echocardiogram ordered  If no further improvement, consider acute adrenal insufficiency workup  PT/OT eval and treat  Neurology consulted, appreciate recommendations  If syncopal episode , check STAT prolactin within 15 minutes    BERNA (acute kidney injury) ([address])  Assessment & Plan  POA Creatinine 1.44  Baseline 0.9-1.2  Improved with fluids    Plan  IV hydration as above  Avoid nephrotoxic agents    History of stroke  Assessment & Plan  Continue PTA aspirin, Eliquis    T1DM (type 1 diabetes mellitus) ([address])  Assessment & Plan  Lab Results   Component Value Date    HGBA1C 8.8 (H) 11/11/2023       Recent Labs 12/05/23  0428 12/05/23  0737 12/05/23  1151 12/05/23  1614   POCGLU 81 80 208* 245*         Blood Sugar Average: Last 72 hrs:  (P) 176.8    PTA Toujeo 20 units at bedtime, lispro 6 units at mealtimes  Brittle diabetes    Plan  Continue home regimen  Do not hold basal insulin  SSI  Accuchecks  Hypoglycemia protocol  Carbohydrate controlled diet    Paroxysmal atrial fibrillation (HCC)  Assessment & Plan  Continue PTA Eliquis   Not on rate-controlling medications due to chronic hypotension    Orthostatic hypotension  Assessment & Plan  Continue PTA midodrine  IV hydration as above  Repeat orthostatic vitals were positive  Compression stockings and abdominal binder    Adrenal insufficiency (HCC)  Assessment & Plan  PTA hydrocortisone 20 mg AM, 15 mg PM  PTA fludrocortisone 0.2 mg daily  Per Endocrinology, unlikely that patient's orthostatic episodes are related to adrenal insufficiency    Plan  Endocrinology on board, appreciate recommendations  Hydrocortisone 25 mg BID  Fludrocortisone 0.2 mg daily  If patient experiences prolonged and persistent hypotension consistent with adrenal crisis, recommend 100 mg IV hydrocortisone            VTE Pharmacologic Prophylaxis: VTE Score: 4 Moderate Risk (Score 3-4) - Pharmacological DVT Prophylaxis Ordered: apixaban (Eliquis). Mobility:   Basic Mobility Inpatient Raw Score: 16  JH-HLM Goal: 5: Stand one or more mins  JH-HLM Achieved: 5: Stand (1 or more minutes)  HLM Goal achieved. Continue to encourage appropriate mobility. Patient Centered Rounds: I performed bedside rounds with nursing staff today. Discussions with Specialists or Other Care Team Provider: Attending, Endocrinology, Senior resident, Nursing    Education and Discussions with Family / Patient: Updated  (daughter) via phone.     Current Length of Stay: 0 day(s)  Current Patient Status: Inpatient   Discharge Plan: Anticipate discharge in 24-48 hrs to discharge location to be determined pending rehab evaluations. Code Status: Level 1 - Full Code    Subjective:   Early this morning, rapid response was called for unwitnessed fall. He was found on the floor at bedside sitting up. CT head and cervical spine were ordered and unremarkable. Unclear if there was a syncopal episode. Patient was seen and examined at bedside. When asked about the fall/episode, patient states that he does not remember what happened. He denies headaches, light-headedness, dizziness, chest pain, nausea, vomiting, extremity weakness. No other complaints at this time. Objective:     Vitals:   Temp (24hrs), Av.2 °F (36.8 °C), Min:97.5 °F (36.4 °C), Max:98.9 °F (37.2 °C)    Temp:  [97.5 °F (36.4 °C)-98.9 °F (37.2 °C)] 97.5 °F (36.4 °C)  HR:  [70-92] 86  Resp:  [18] 18  BP: ()/(40-74) 111/55  SpO2:  [85 %-100 %] 98 %  Body mass index is 25.58 kg/m². Input and Output Summary (last 24 hours): Intake/Output Summary (Last 24 hours) at 2023 1734  Last data filed at 2023 1601  Gross per 24 hour   Intake 240 ml   Output 1000 ml   Net -760 ml       Physical Exam:   Physical Exam  Vitals and nursing note reviewed. Constitutional:       General: He is not in acute distress. Appearance: He is well-developed. HENT:      Head: Normocephalic and atraumatic. Mouth/Throat:      Mouth: Mucous membranes are dry. Eyes:      Extraocular Movements: Extraocular movements intact. Conjunctiva/sclera: Conjunctivae normal.      Pupils: Pupils are equal, round, and reactive to light. Cardiovascular:      Rate and Rhythm: Normal rate and regular rhythm. Heart sounds: No murmur heard. Pulmonary:      Effort: Pulmonary effort is normal. No respiratory distress. Breath sounds: Normal breath sounds. Abdominal:      Palpations: Abdomen is soft. Tenderness: There is no abdominal tenderness. Musculoskeletal:         General: No swelling. Cervical back: Neck supple.       Right lower leg: No edema. Left lower leg: No edema. Skin:     General: Skin is warm and dry. Capillary Refill: Capillary refill takes less than 2 seconds. Neurological:      General: No focal deficit present. Mental Status: He is alert and oriented to person, place, and time. Psychiatric:         Mood and Affect: Mood normal.        Additional Data:     Labs:  Results from last 7 days   Lab Units 12/05/23  0503 12/04/23  1233   WBC Thousand/uL 8.55 9.63   HEMOGLOBIN g/dL 12.0 13.5   HEMATOCRIT % 37.5 41.3   PLATELETS Thousands/uL 142* 169   NEUTROS PCT %  --  58   LYMPHS PCT %  --  30   MONOS PCT %  --  11   EOS PCT %  --  1     Results from last 7 days   Lab Units 12/05/23  0503 12/04/23  1233   SODIUM mmol/L 140 137   POTASSIUM mmol/L 4.2 4.5   CHLORIDE mmol/L 109* 104   CO2 mmol/L 21 23   BUN mg/dL 26* 27*   CREATININE mg/dL 1.23 1.44*   ANION GAP mmol/L 10 10   CALCIUM mg/dL 8.5 9.0   ALBUMIN g/dL  --  4.2   TOTAL BILIRUBIN mg/dL  --  0.60   ALK PHOS U/L  --  88   ALT U/L  --  12   AST U/L  --  15   GLUCOSE RANDOM mg/dL 65 269*         Results from last 7 days   Lab Units 12/05/23  1614 12/05/23  1151 12/05/23  0737 12/05/23  0428 12/04/23  2050   POC GLUCOSE mg/dl 245* 208* 80 81 270*               Lines/Drains:  Invasive Devices       Peripheral Intravenous Line  Duration             Peripheral IV 12/05/23 Distal;Dorsal (posterior); Left Forearm <1 day                      Telemetry:  Telemetry Orders (From admission, onward)               24 Hour Telemetry Monitoring  Continuous x 24 Hours (Telem)        Question:  Reason for 24 Hour Telemetry  Answer:  Syncope suspected to be cardiac in origin                     Telemetry Reviewed: Normal Sinus Rhythm  Indication for Continued Telemetry Use: Syncope             Imaging: Reviewed radiology reports from this admission including: CT head and CT cervical spine    Recent Cultures (last 7 days):         Last 24 Hours Medication List:   Current Facility-Administered Medications   Medication Dose Route Frequency Provider Last Rate    acetaminophen  650 mg Oral Q6H PRN Timothy Hendricks MD      apixaban  5 mg Oral BID Timothy Hendricks MD      aspirin  81 mg Oral Daily Timothy Hendricks MD      atorvastatin  10 mg Oral Daily Timothy Hendricks MD      calcitriol  0.25 mcg Oral Daily Timothy Hendricks MD      cyanocobalamin  100 mcg Oral Daily Timothy Hendricks MD      ferrous sulfate  325 mg Oral Daily With Breakfast Timothy Hendricks MD      [START ON 12/6/2023] fludrocortisone  0.2 mg Oral Daily Didi Burundian, DO      [START ON 12/6/2023] hydrocortisone  25 mg Oral QAM Didi Burundian, DO      hydrocortisone  25 mg Oral Daily Didi Burundian, DO      insulin glargine  18 Units Subcutaneous HS Timothy Hendricks MD      insulin lispro  1-5 Units Subcutaneous TID Evan Acosta MD      insulin lispro  1-5 Units Subcutaneous HS Timothy Hendricks MD      insulin lispro  6 Units Subcutaneous TID With Miladis Enriquez MD      magnesium Oxide  800 mg Oral BID Zelalem Dickinson MD      magnesium sulfate  2 g Intravenous Once Timothy Hendricks MD      melatonin  3 mg Oral HS Chicho Weinberg,       midodrine  15 mg Oral TID Evan Acosta MD      ondansetron  4 mg Intravenous Q6H PRN Timothy Hendricks MD      risperiDONE  1 mg Oral BID Timothy Hendricks MD      sertraline  100 mg Oral Daily Timothy Hendricks MD      sodium chloride  100 mL/hr Intravenous Continuous Timothy Hendricks  mL/hr (12/04/23 1959)    sodium chloride  1 g Oral TID Timothy Hendricks MD          Today, Patient Was Seen By: Timothy Hendricks MD    **Please Note: This note may have been constructed using a voice recognition system. **

## 2023-12-05 NOTE — H&P
[facility]  H&P  Name: Barbi Truong 79 y.o. male I MRN: [id number]  Unit/Bed#: S -01 I Date of Admission: 12/4/2023   Date of Service: 12/4/2023 I Hospital Day: 0      Assessment/Plan   Syncope  Assessment & Plan  Syncopal episode the morning of this admission. No head strike. Noted to be diaphoretic, bluish colored lips, and urinary incontinence. Reportedly regained consciousness within 1 minute, but blank stare and slurred speech were noted over the next hour. Additional episodes of blank stare later in the day prior to arrival in ED. Notably, hospitalized for similar symptoms after syncopal episode on 11/10/2023. PMH of orthostatic hypotension (on midodrine), T1DM, and adrenal insufficiency (on hydrocortisone and fludrocortisone)  Orthostatic vitals positive in the /62 lying, 96/51 sitting  CXR - no acute cardiopulmonary disease  CT head - no acute abnormalities, stable microangiopathic changes  ECG - NSR, rate 71, T wave inversions in inferior and lateral leads  Etiology: orthostatic vs cardiogenic syncope. Possible component of adrenal insufficiency or autonomic dysfunction secondary to T1DM.  Less suspicion for TIA or seizure     Plan  Continue PTA midodrine  Continue PTA hydrocortisone and fludrocortisone   mL/hr infusion  AM Orthostatic vitals  Monitor on telemetry  Echocardiogram ordered  If no further improvement, consider acute adrenal insufficiency workup  PT/OT eval and treat  Neurology consulted given blank stare and urinary incontinence, appreciate recommendations    BERNA (acute kidney injury) ([address])  Assessment & Plan  POA Creatinine 1.44  Baseline 0.9-1.2    Plan  IV hydration as above  Avoid nephrotoxic agents    History of stroke  Assessment & Plan  Continue PTA aspirin, Eliquis    T1DM (type 1 diabetes mellitus) ([address])  Assessment & Plan  Lab Results   Component Value Date    HGBA1C 8.8 (H) 11/11/2023       Recent Labs     12/04/23 2050   POCGLU 270* Blood Sugar Average: Last 72 hrs:  (P) 270    PTA Toujeo 20 units at bedtime, lispro 6 units at mealtimes  Brittle diabetes    Plan  Continue home regimen  SSI  Accuchecks  Hypoglycemia protocol  Carbohydrate controlled diet    Paroxysmal atrial fibrillation (HCC)  Assessment & Plan  Continue PTA Eliquis   Not on rate-controlling medications due to chronic hypotension    Orthostatic hypotension  Assessment & Plan  Continue PTA midodrine  IV hydration as above  Repeat orthostatic vitals    Adrenal insufficiency (HCC)  Assessment & Plan  Continue PTA hydrocortisone 20 mg AM, 15 mg PM  Continue PTA fludrocortisone 0.2 mg daily  Consider acute adrenal insufficiency work-up if no improvement           VTE Pharmacologic Prophylaxis: VTE Score: 4 Moderate Risk (Score 3-4) - Pharmacological DVT Prophylaxis Ordered: apixaban (Eliquis). Code Status: Level 1 - Full Code   Discussion with family: Updated  (daughter) at bedside. Anticipated Length of Stay: Patient will be admitted on an observation basis with an anticipated length of stay of less than 2 midnights secondary to syncope. Chief Complaint: syncope    History of Present Illness:  Awilda Rodriguez is a 79 y.o. male with a PMH of Afib on Eliquis, chronic orthostatic hypotension, adrenal insufficiency, T1DM who presents after a syncopal episode earlier this morning while his caregiver was getting him ready for a doctor's appointment. Diaphoresis, blue-colored lips, and urinary incontinence were noted. . No tonic-clonic jerking movements were seen, but when the patient regained consciousness, he had a blank stare and was generally unresponsive to family's questioning. However, he has been having urinary incontinence for approximately 1 week. He was seen by PCP this AM, and was referred to Urology. He has not eaten anything since breakfast this morning, but his blood sugars are in the 200s-300s per Dexcom.      Per patient's daughter, patient has been having syncopal episodes for the past several weeks, and was hospitalized 1 month ago for the same presentation. At the time, no changes to regimen were made except for sodium tablets, and patient was discharged home. The syncopal episode was attributed to his orthostatic hypotension with a component of adrenal insufficiency and autonomic dysfunction. In the ED, labs were largely unremarkable except for creatinine of 1.44. His orthostatic vitals were positive, though this was documented midway through a 1L IV fluid bolus. Patient admitted to [facility] for observation. Review of Systems:  Review of Systems   Constitutional:  Negative for chills and fever. HENT:  Negative for ear pain and sore throat. Eyes:  Negative for pain and visual disturbance. Respiratory:  Negative for cough and shortness of breath. Cardiovascular:  Negative for chest pain and palpitations. Gastrointestinal:  Negative for abdominal pain and vomiting. Genitourinary:  Negative for dysuria and hematuria. Musculoskeletal:  Negative for arthralgias and back pain. Skin:  Negative for color change and rash. Neurological:  Positive for dizziness and syncope. Negative for seizures. All other systems reviewed and are negative. Past Medical and Surgical History:   Past Medical History:   Diagnosis Date    A-fib [facility])     Adrenal insufficiency (HCC)     Atrial fibrillation ([address] [facility])     Diabetes mellitus ([address] [facility])     Obesity, morbid ([address] [facility]) 11/14/2022    Stroke [facility])        History reviewed. No pertinent surgical history. Meds/Allergies:  Prior to Admission medications    Medication Sig Start Date End Date Taking?  Authorizing Provider   apixaban (Eliquis) 5 mg Take 1 tablet (5 mg total) by mouth 2 (two) times a day 10/29/23  Yes Megan Montiel MD   aspirin 81 mg chewable tablet Chew 81 mg daily   Yes Historical Provider, MD   atorvastatin (LIPITOR) 10 mg tablet Take 1 tablet by mouth daily 7/12/22  Yes Historical Provider, MD calcitriol (ROCALTROL) 0.25 mcg capsule Take 1 capsule (0.25 mcg total) by mouth daily Do not start before November 15, 2023. 11/15/23  Yes Henrietta Botello MD   cyanocobalamin (VITAMIN B-12) 100 MCG tablet Take 1 tablet (100 mcg total) by mouth daily Do not start before November 15, 2023. 11/15/23  Yes Henrietta Botello MD   ferrous sulfate 324 (65 Fe) mg TAKE 1 TABLET (324 MG TOTAL) BY MOUTH DAILY BEFORE BREAKFAST 6/26/23  Yes Tessa Fontaine MD   fludrocortisone (FLORINEF) 0.1 mg tablet Take 2 tablets (0.2 mg total) by mouth daily 12/1/23  Yes MD Aaron   hydrocortisone (CORTEF) 5 mg tablet Use 4tabs (20mg) in morning and 3 tabs (15mg) daily afternoon 12/1/23  Yes MD Aaron   insulin lispro (HumaLOG KwikPen) 100 units/mL injection pen 6u tidac plus 1u/50 above 150mg/dL; max dose 40u/day 12/1/23  Yes MD Aaron   midodrine (PROAMATINE) 10 MG tablet Take 1.5 tablets (15 mg total) by mouth 3 (three) times a day before meals 10/27/23 10/21/24 Yes Alanis Rios MD   risperiDONE (RisperDAL) 1 mg tablet Take 1 tablet by mouth 2 (two) times a day   Yes Historical Provider, MD   sertraline (ZOLOFT) 100 mg tablet Take 100 mg by mouth daily 3/16/22 12/4/23 Yes Historical Provider, MD   sodium chloride 1 g tablet Take 1 tablet (1 g total) by mouth 3 (three) times a day 11/14/23  Yes Henrietta Botello MD   Continuous Blood Gluc  (Dexcom G7 ) CYN Use 1 each continuous 10/23/23   MD Aaron   Continuous Blood Gluc Sensor (Dexcom G7 Sensor) Use 1 Device every 10 days 11/29/23   MD Aaron   ergocalciferol (VITAMIN D2) 50,000 units Take 1 capsule (50,000 Units total) by mouth once a week  Patient not taking: Reported on 12/4/2023 7/6/23   Tessa Fontaine MD   insulin glargine (Toujeo Max SoloStar) 300 units/mL CONCENTRATED U-300 injection pen (2-unit dial) Inject 20 Units under the skin daily at bedtime  Patient taking differently: Inject 18 Units under the skin daily at bedtime 12/1/23   Paul Crawley MD   Insulin Pen Needle (BD Pen Needle Josie 2nd Gen) 32G X 4 MM MISC For use with insulin pen 4 times daily. Pharmacy may dispense brand covered by insurance. 10/26/23   Meli Butts MD   Insulin Pen Needle (BD Pen Needle Josie 2nd Gen) 32G X 4 MM MISC FOR USE WITH INSULIN PEN. PHARMACY MAY DISPENSE BRAND COVERED BY INSURANCE. 12/1/23   Paul Crawley MD   polyethylene glycol (GOLYTELY) 4000 mL solution Take 4,000 mL by mouth once for 1 dose 9/11/23 9/11/23  Tru Lozada PA-C   potassium chloride (Klor-Con) 10 mEq tablet Take 20 mEq by mouth 2 (two) times a day 10/13/22 10/13/23  Historical Provider, MD   sodium chloride, concentrated, 2.5 MEQ/ML Take 1 g by mouth Three times a day  Patient not taking: Reported on 12/4/2023 2/7/23 12/4/23  Historical Provider, MD     I have reviewed home medications with patient family member. Allergies:    Allergies   Allergen Reactions    Imipramine Other (See Comments)    Lisinopril Other (See Comments)    Paroxetine Other (See Comments)    Penicillins Hives    Rosiglitazone Other (See Comments)    Troglitazone Other (See Comments)       Social History:  Marital Status: Single   Occupation: Retired  Patient Pre-hospital Living Situation: Home, With other family member: significant other  Patient Pre-hospital Level of Mobility: walks with walker  Patient Pre-hospital Diet Restrictions: None  Substance Use History:   Social History     Substance and Sexual Activity   Alcohol Use Not Currently    Alcohol/week: 5.0 standard drinks of alcohol    Types: 5 Cans of beer per week    Comment: Quit in august 2022     Social History     Tobacco Use   Smoking Status Former    Packs/day: 0.25    Years: 30.00    Total pack years: 7.50    Types: Cigarettes   Smokeless Tobacco Never     Social History     Substance and Sexual Activity   Drug Use Never       Family History:  Family History   Problem Relation Age of Onset    Heart disease Mother     Cancer Father     Multiple sclerosis Sister        Physical Exam:     Vitals:   Blood Pressure: 107/74 (12/04/23 2044)  Pulse: 75 (12/04/23 2044)  Temperature: 98.9 °F (37.2 °C) (12/04/23 2044)  Temp Source: Oral (12/04/23 1212)  Respirations: 18 (12/04/23 2044)  Weight - Scale: 67.9 kg (149 lb 11.1 oz) (12/04/23 1212)  SpO2: 97 % (12/04/23 2044)    Physical Exam  Vitals and nursing note reviewed. Constitutional:       General: He is not in acute distress. Appearance: He is well-developed. HENT:      Head: Normocephalic and atraumatic. Mouth/Throat:      Mouth: Mucous membranes are dry. Eyes:      Extraocular Movements: Extraocular movements intact. Conjunctiva/sclera: Conjunctivae normal.      Pupils: Pupils are equal, round, and reactive to light. Cardiovascular:      Rate and Rhythm: Normal rate and regular rhythm. Heart sounds: No murmur heard. Pulmonary:      Effort: Pulmonary effort is normal. No respiratory distress. Breath sounds: Normal breath sounds. Abdominal:      Palpations: Abdomen is soft. Tenderness: There is no abdominal tenderness. Musculoskeletal:         General: No swelling. Cervical back: Neck supple. Right lower leg: No edema. Left lower leg: No edema. Skin:     General: Skin is warm and dry. Capillary Refill: Capillary refill takes less than 2 seconds. Neurological:      General: No focal deficit present. Mental Status: He is alert and oriented to person, place, and time.    Psychiatric:         Mood and Affect: Mood normal.       Additional Data:     Lab Results:  Results from last 7 days   Lab Units 12/04/23  1233   WBC Thousand/uL 9.63   HEMOGLOBIN g/dL 13.5   HEMATOCRIT % 41.3   PLATELETS Thousands/uL 169   NEUTROS PCT % 58   LYMPHS PCT % 30   MONOS PCT % 11   EOS PCT % 1     Results from last 7 days   Lab Units 12/04/23  1233   SODIUM mmol/L 137   POTASSIUM mmol/L 4.5   CHLORIDE mmol/L 104   CO2 mmol/L 23   BUN mg/dL 27*   CREATININE mg/dL 1.44*   ANION GAP mmol/L 10   CALCIUM mg/dL 9.0   ALBUMIN g/dL 4.2   TOTAL BILIRUBIN mg/dL 0.60   ALK PHOS U/L 88   ALT U/L 12   AST U/L 15   GLUCOSE RANDOM mg/dL 269*         Results from last 7 days   Lab Units 12/04/23  2050   POC GLUCOSE mg/dl 270*               Lines/Drains:  Invasive Devices       Peripheral Intravenous Line  Duration             Peripheral IV 12/04/23 Left Hand <1 day                        Imaging: Reviewed radiology reports from this admission including: chest xray and CT head  CT head without contrast   Final Result by Randy Lopez MD (12/04 3025)      No acute intracranial abnormality. Stable chronic microangiopathic changes within the brain. Workstation performed: [id number]         XR chest 1 view portable   ED Interpretation by Juan Martinez DO (12/04 6475)   Chest x-ray shows no acute cardiopulmonary pathology as interpreted by myself pending final radiology read        Final Result by Marcie Malloy MD (12/04 9708)      No acute cardiopulmonary disease. Workstation performed: [id number]             EKG and Other Studies Reviewed on Admission:   EKG: NSR. HR 71.    ** Please Note: This note has been constructed using a voice recognition system.  **

## 2023-12-05 NOTE — ASSESSMENT & PLAN NOTE
Lab Results   Component Value Date    HGBA1C 8.8 (H) 11/11/2023       Recent Labs     12/04/23 2050   POCGLU 270*       Blood Sugar Average: Last 72 hrs:  (P) 270    PTA Toujeo 20 units at bedtime, lispro 6 units at mealtimes  Brittle diabetes    Plan  Continue home regimen  SSI  Accuchecks  Hypoglycemia protocol  Carbohydrate controlled diet

## 2023-12-05 NOTE — ASSESSMENT & PLAN NOTE
Syncopal episode the morning of this admission. No head strike. Noted to be diaphoretic, bluish colored lips, and urinary incontinence. Reportedly regained consciousness within 1 minute, but blank stare and slurred speech were noted over the next hour. Additional episodes of blank stare later in the day prior to arrival in ED. Notably, hospitalized for similar symptoms after syncopal episode on 11/10/2023. PMH of orthostatic hypotension (on midodrine), T1DM, and adrenal insufficiency (on hydrocortisone and fludrocortisone)  Orthostatic vitals positive in the /62 lying, 96/51 sitting  CXR - no acute cardiopulmonary disease  CT head - no acute abnormalities, stable microangiopathic changes  ECG - NSR, rate 71, T wave inversions in inferior and lateral leads  Etiology: orthostatic vs cardiogenic syncope. Possible component of adrenal insufficiency or autonomic dysfunction secondary to T1DM.  Less suspicion for TIA or seizure     Plan  Continue PTA midodrine  Continue PTA hydrocortisone and fludrocortisone   mL/hr infusion  AM Orthostatic vitals  Monitor on telemetry  Echocardiogram ordered  If no further improvement, consider acute adrenal insufficiency workup  PT/OT eval and treat  Neurology consulted given blank stare and urinary incontinence, appreciate recommendations

## 2023-12-05 NOTE — PLAN OF CARE
Problem: Potential for Falls  Goal: Patient will remain free of falls  Description: INTERVENTIONS:  - Educate patient/family on patient safety including physical limitations  - Instruct patient to call for assistance with activity   - Consult OT/PT to assist with strengthening/mobility   - Keep Call bell within reach  - Keep bed low and locked with side rails adjusted as appropriate  - Keep care items and personal belongings within reach  - Initiate and maintain comfort rounds  - Make Fall Risk Sign visible to staff  - Offer Toileting every  Hours, in advance of need  - Initiate/Maintain alarm  - Obtain necessary fall risk management equipmen  - Apply yellow socks and bracelet for high fall risk patients  - Consider moving patient to room near nurses station  Outcome: Progressing     Problem: CARDIOVASCULAR - ADULT  Goal: Maintains optimal cardiac output and hemodynamic stability  Description: INTERVENTIONS:  - Monitor I/O, vital signs and rhythm  - Monitor for S/S and trends of decreased cardiac output  - Administer and titrate ordered vasoactive medications to optimize hemodynamic stability  - Assess quality of pulses, skin color and temperature  - Assess for signs of decreased coronary artery perfusion  - Instruct patient to report change in severity of symptoms  Outcome: Progressing  Goal: Absence of cardiac dysrhythmias or at baseline rhythm  Description: INTERVENTIONS:  - Continuous cardiac monitoring, vital signs, obtain 12 lead EKG if ordered  - Administer antiarrhythmic and heart rate control medications as ordered  - Monitor electrolytes and administer replacement therapy as ordered  Outcome: Progressing

## 2023-12-05 NOTE — ASSESSMENT & PLAN NOTE
- See ortho BPs as noted above  - Currently on Florinef and Midodrine  - Recommend thigh-high NATALIO stockings  - Medical management per primary team

## 2023-12-05 NOTE — PLAN OF CARE
Problem: OCCUPATIONAL THERAPY ADULT  Goal: Performs self-care activities at highest level of function for planned discharge setting. See evaluation for individualized goals. Description: Treatment Interventions: ADL retraining, Functional transfer training, Endurance training, Cognitive reorientation, Patient/family training, Equipment evaluation/education, Compensatory technique education, Continued evaluation, Energy conservation, Activityengagement  Equipment Recommended: Bedside commode       See flowsheet documentation for full assessment, interventions and recommendations. Note: Limitation: Decreased ADL status, Decreased Safe judgement during ADL, Decreased cognition, Decreased endurance, Decreased self-care trans, Decreased high-level ADLs (balance, fxnl mobility, act jennifer, fxnl reach, standing jennifer, and strength, attention to task, direction following, safety awareness, insight, pacing, problem solving, initiation, and appropriate responses , display of emotion, response time)  Prognosis: Good  Assessment: Pt is a 79 y.o. male seen for OT evaluation s/p admit to [address] [place] [address] on 12/4/2023 w/ hypotension and weakness. Prior to admission, pt was living with fiance in a 2 level house, (A) with ADLs, (A) with IADLs, (+) falls, (-) . Personal and environmental factors affecting patient at time of evaluation include current habits and behavioral patterns, limited social support, inaccessible home environment, difficulty completing ADLs, and difficulty completing IADLs. Personal factors supporting patient at time of evaluation include age, supportive fiance and caregiver, attitude towards recovery, and FFSU. Based upon this evaluation, pt is functioning below baseline. Pt will benefit from continued skilled inpatient OT to maximize safety, level of independence overall performance in ADLs, functional transfers, functional mobility to return to functional baseline/highest level of function. Rehab Resource Intensity Level, OT: II (Moderate Resource Intensity) (may progress to level III pending progress towards goals and level of social support available at home.)     Haja Olson, [address] [place] [address], OTR/L  [place] License [id number]  [address] [place], [place] [id number]

## 2023-12-06 LAB
ANION GAP SERPL CALCULATED.3IONS-SCNC: 7 MMOL/L
BUN SERPL-MCNC: 20 MG/DL (ref 5–25)
CALCIUM SERPL-MCNC: 8.1 MG/DL (ref 8.4–10.2)
CHLORIDE SERPL-SCNC: 109 MMOL/L (ref 96–108)
CO2 SERPL-SCNC: 24 MMOL/L (ref 21–32)
CREAT SERPL-MCNC: 0.83 MG/DL (ref 0.6–1.3)
ERYTHROCYTE [DISTWIDTH] IN BLOOD BY AUTOMATED COUNT: 14.6 % (ref 11.6–15.1)
GFR SERPL CREATININE-BSD FRML MDRD: 91 ML/MIN/1.73SQ M
GLUCOSE SERPL-MCNC: 121 MG/DL (ref 65–140)
GLUCOSE SERPL-MCNC: 131 MG/DL (ref 65–140)
GLUCOSE SERPL-MCNC: 142 MG/DL (ref 65–140)
GLUCOSE SERPL-MCNC: 80 MG/DL (ref 65–140)
GLUCOSE SERPL-MCNC: 92 MG/DL (ref 65–140)
HCT VFR BLD AUTO: 33 % (ref 36.5–49.3)
HGB BLD-MCNC: 11.1 G/DL (ref 12–17)
MAGNESIUM SERPL-MCNC: 1.5 MG/DL (ref 1.9–2.7)
MCH RBC QN AUTO: 32.5 PG (ref 26.8–34.3)
MCHC RBC AUTO-ENTMCNC: 33.6 G/DL (ref 31.4–37.4)
MCV RBC AUTO: 97 FL (ref 82–98)
PLATELET # BLD AUTO: 127 THOUSANDS/UL (ref 149–390)
PMV BLD AUTO: 11.3 FL (ref 8.9–12.7)
POTASSIUM SERPL-SCNC: 3.4 MMOL/L (ref 3.5–5.3)
RBC # BLD AUTO: 3.42 MILLION/UL (ref 3.88–5.62)
SODIUM SERPL-SCNC: 140 MMOL/L (ref 135–147)
WBC # BLD AUTO: 7.17 THOUSAND/UL (ref 4.31–10.16)

## 2023-12-06 PROCEDURE — 82948 REAGENT STRIP/BLOOD GLUCOSE: CPT

## 2023-12-06 PROCEDURE — 99232 SBSQ HOSP IP/OBS MODERATE 35: CPT | Performed by: INTERNAL MEDICINE

## 2023-12-06 PROCEDURE — 85027 COMPLETE CBC AUTOMATED: CPT

## 2023-12-06 PROCEDURE — 80048 BASIC METABOLIC PNL TOTAL CA: CPT

## 2023-12-06 PROCEDURE — 97163 PT EVAL HIGH COMPLEX 45 MIN: CPT

## 2023-12-06 PROCEDURE — 83735 ASSAY OF MAGNESIUM: CPT

## 2023-12-06 PROCEDURE — 99232 SBSQ HOSP IP/OBS MODERATE 35: CPT | Performed by: STUDENT IN AN ORGANIZED HEALTH CARE EDUCATION/TRAINING PROGRAM

## 2023-12-06 RX ORDER — LABETALOL HYDROCHLORIDE 5 MG/ML
10 INJECTION, SOLUTION INTRAVENOUS EVERY 6 HOURS PRN
Status: DISCONTINUED | OUTPATIENT
Start: 2023-12-06 | End: 2023-12-06

## 2023-12-06 RX ORDER — POTASSIUM CHLORIDE 20 MEQ/1
20 TABLET, EXTENDED RELEASE ORAL 2 TIMES DAILY
Status: COMPLETED | OUTPATIENT
Start: 2023-12-06 | End: 2023-12-06

## 2023-12-06 RX ORDER — LANOLIN ALCOHOL/MO/W.PET/CERES
400 CREAM (GRAM) TOPICAL 2 TIMES DAILY
Status: DISCONTINUED | OUTPATIENT
Start: 2023-12-07 | End: 2023-12-07 | Stop reason: HOSPADM

## 2023-12-06 RX ORDER — MAGNESIUM SULFATE HEPTAHYDRATE 40 MG/ML
2 INJECTION, SOLUTION INTRAVENOUS ONCE
Status: COMPLETED | OUTPATIENT
Start: 2023-12-06 | End: 2023-12-06

## 2023-12-06 RX ADMIN — INSULIN GLARGINE 18 UNITS: 100 INJECTION, SOLUTION SUBCUTANEOUS at 21:06

## 2023-12-06 RX ADMIN — FERROUS SULFATE TAB 325 MG (65 MG ELEMENTAL FE) 325 MG: 325 (65 FE) TAB at 07:33

## 2023-12-06 RX ADMIN — SODIUM CHLORIDE 1 G: 1 TABLET ORAL at 08:16

## 2023-12-06 RX ADMIN — Medication 3 MG: at 21:06

## 2023-12-06 RX ADMIN — MAGNESIUM SULFATE HEPTAHYDRATE 2 G: 40 INJECTION, SOLUTION INTRAVENOUS at 07:26

## 2023-12-06 RX ADMIN — SODIUM CHLORIDE 1 G: 1 TABLET ORAL at 21:05

## 2023-12-06 RX ADMIN — ATORVASTATIN CALCIUM 10 MG: 10 TABLET, FILM COATED ORAL at 08:16

## 2023-12-06 RX ADMIN — Medication 800 MG: at 17:47

## 2023-12-06 RX ADMIN — MIDODRINE HYDROCHLORIDE 15 MG: 5 TABLET ORAL at 05:09

## 2023-12-06 RX ADMIN — INSULIN LISPRO 6 UNITS: 100 INJECTION, SOLUTION INTRAVENOUS; SUBCUTANEOUS at 12:18

## 2023-12-06 RX ADMIN — MIDODRINE HYDROCHLORIDE 15 MG: 5 TABLET ORAL at 15:50

## 2023-12-06 RX ADMIN — APIXABAN 5 MG: 5 TABLET, FILM COATED ORAL at 17:47

## 2023-12-06 RX ADMIN — HYDROCORTISONE 25 MG: 5 TABLET ORAL at 08:19

## 2023-12-06 RX ADMIN — SODIUM CHLORIDE 100 ML/HR: 0.9 INJECTION, SOLUTION INTRAVENOUS at 05:09

## 2023-12-06 RX ADMIN — Medication 100 MCG: at 08:16

## 2023-12-06 RX ADMIN — INSULIN LISPRO 6 UNITS: 100 INJECTION, SOLUTION INTRAVENOUS; SUBCUTANEOUS at 17:44

## 2023-12-06 RX ADMIN — INSULIN LISPRO 6 UNITS: 100 INJECTION, SOLUTION INTRAVENOUS; SUBCUTANEOUS at 08:21

## 2023-12-06 RX ADMIN — POTASSIUM CHLORIDE 20 MEQ: 1500 TABLET, EXTENDED RELEASE ORAL at 08:16

## 2023-12-06 RX ADMIN — SODIUM CHLORIDE 1 G: 1 TABLET ORAL at 15:50

## 2023-12-06 RX ADMIN — RISPERIDONE 1 MG: 1 TABLET ORAL at 17:47

## 2023-12-06 RX ADMIN — APIXABAN 5 MG: 5 TABLET, FILM COATED ORAL at 08:16

## 2023-12-06 RX ADMIN — FLUDROCORTISONE ACETATE 0.2 MG: 0.1 TABLET ORAL at 08:19

## 2023-12-06 RX ADMIN — SERTRALINE 100 MG: 100 TABLET, FILM COATED ORAL at 08:16

## 2023-12-06 RX ADMIN — POTASSIUM CHLORIDE 20 MEQ: 1500 TABLET, EXTENDED RELEASE ORAL at 17:47

## 2023-12-06 RX ADMIN — MIDODRINE HYDROCHLORIDE 15 MG: 5 TABLET ORAL at 11:44

## 2023-12-06 RX ADMIN — RISPERIDONE 1 MG: 1 TABLET ORAL at 08:15

## 2023-12-06 RX ADMIN — HYDROCORTISONE 25 MG: 5 TABLET ORAL at 15:51

## 2023-12-06 RX ADMIN — Medication 800 MG: at 08:15

## 2023-12-06 RX ADMIN — ASPIRIN 81 MG CHEWABLE TABLET 81 MG: 81 TABLET CHEWABLE at 08:15

## 2023-12-06 RX ADMIN — CALCITRIOL 0.25 MCG: 0.25 CAPSULE, LIQUID FILLED ORAL at 08:18

## 2023-12-06 NOTE — PROGRESS NOTES
[facility]  Progress Note  Name: Ivan Laura  MRN: [id number]  Unit/Bed#: S -01 I Date of Admission: 12/4/2023   Date of Service: 12/6/2023 I Hospital Day: 1    Assessment/Plan   Syncope  Assessment & Plan  Syncopal episode the morning of this admission. No head strike. Noted to be diaphoretic, bluish colored lips, and urinary incontinence. Reportedly regained consciousness within 1 minute, but blank stare and slurred speech were noted over the next hour. Additional episodes of blank stare later in the day prior to arrival in ED. Notably, hospitalized for similar symptoms after syncopal episode on 11/10/2023. PMH of orthostatic hypotension (on midodrine), T1DM, and adrenal insufficiency (on hydrocortisone and fludrocortisone)  Orthostatic vitals positive in the /62 lying, 96/51 sitting  CXR - no acute cardiopulmonary disease  CT head - no acute abnormalities, stable microangiopathic changes  ECG - NSR, rate 71, T wave inversions in inferior and lateral leads  Etiology: orthostatic vs cardiogenic syncope. Possible component of adrenal insufficiency or autonomic dysfunction secondary to T1DM.  Less suspicion for TIA or seizure   Echocardiogram - LVEF 60%  EEG unrevealing    Plan  Continue PTA midodrine  Continue hydrocortisone 25 mg BID and fludrocortisone 0.2 mg daily  PT/OT eval and treat  Neurology consulted, appreciate recommendations  If syncopal episode , check STAT prolactin within 15 minutes  Prolonged ambulatory EEG vs inpatient EMU admission for spell capture and characterization  No antiepileptic medications at this time    BERNA (acute kidney injury) ([address])  Assessment & Plan  POA Creatinine 1.44  Baseline 0.9-1.2  Improved with fluids    Plan  Avoid nephrotoxic agents    History of stroke  Assessment & Plan  Continue PTA aspirin, Eliquis    T1DM (type 1 diabetes mellitus) ([address])  Assessment & Plan  Lab Results   Component Value Date    HGBA1C 8.8 (H) 11/11/2023 Recent Labs     12/05/23  1614 12/05/23  2047 12/06/23  0719 12/06/23  1111   POCGLU 245* 181* 80 142*         Blood Sugar Average: Last 72 hrs:  (P) 160.875    PTA Toujeo 20 units at bedtime, lispro 6 units at mealtimes  Brittle diabetes    Plan  Continue home regimen  Do not hold basal insulin  SSI  Accuchecks  Hypoglycemia protocol  Carbohydrate controlled diet    Paroxysmal atrial fibrillation (HCC)  Assessment & Plan  Continue PTA Eliquis   Not on rate-controlling medications due to chronic hypotension    Orthostatic hypotension  Assessment & Plan  Continue PTA midodrine  IV hydration as above  Repeat orthostatic vitals were positive  Compression stockings and abdominal binder    Adrenal insufficiency (HCC)  Assessment & Plan  PTA hydrocortisone 20 mg AM, 15 mg PM  PTA fludrocortisone 0.2 mg daily  Per Endocrinology, unlikely that patient's orthostatic episodes are related to adrenal insufficiency    Plan  Endocrinology on board, appreciate recommendations  Hydrocortisone 25 mg BID  Fludrocortisone 0.2 mg daily  If patient experiences prolonged and persistent hypotension consistent with adrenal crisis, recommend 100 mg IV hydrocortisone            VTE Pharmacologic Prophylaxis: VTE Score: 4 Moderate Risk (Score 3-4) - Pharmacological DVT Prophylaxis Ordered: apixaban (Eliquis). Mobility:   Basic Mobility Inpatient Raw Score: 13  JH-HLM Goal: 4: Move to chair/commode  JH-HLM Achieved: 5: Stand (1 or more minutes)  HLM Goal achieved. Continue to encourage appropriate mobility. Patient Centered Rounds: I performed bedside rounds with nursing staff today. Discussions with Specialists or Other Care Team Provider: Attending, Senior resident, Endocrinology, Nursing    Education and Discussions with Family / Patient: Updated  (daughter) via phone. Current Length of Stay: 1 day(s)  Current Patient Status: Inpatient   Discharge Plan: Anticipate discharge tomorrow to rehab facility.     Code Status: Level 1 - Full Code    Subjective:   No acute events overnight. Patient was seen and examined at bedside. He has no complaints at this time. Elevated blood pressure noted. Denies light-headedness, dizziness, headaches, visual disturbances, chest pain, shortness of breath. Later in the morning, orthostatic blood pressures were negative from lying to sitting, but patient was hypotensive and unable to tolerate standing vitals. Objective:     Vitals:   Temp (24hrs), Av.3 °F (36.8 °C), Min:98.2 °F (36.8 °C), Max:98.3 °F (36.8 °C)    Temp:  [98.2 °F (36.8 °C)-98.3 °F (36.8 °C)] 98.2 °F (36.8 °C)  HR:  [67-79] 74  Resp:  [18-20] 20  BP: ()/() 123/58  SpO2:  [88 %-99 %] 98 %  Body mass index is 25.58 kg/m². Input and Output Summary (last 24 hours): Intake/Output Summary (Last 24 hours) at 2023 1421  Last data filed at 2023 1145  Gross per 24 hour   Intake 300 ml   Output 1950 ml   Net -1650 ml       Physical Exam:   Physical Exam  Vitals and nursing note reviewed. Constitutional:       General: He is not in acute distress. Appearance: He is well-developed. HENT:      Head: Normocephalic and atraumatic. Mouth/Throat:      Mouth: Mucous membranes are dry. Eyes:      Extraocular Movements: Extraocular movements intact. Conjunctiva/sclera: Conjunctivae normal.      Pupils: Pupils are equal, round, and reactive to light. Cardiovascular:      Rate and Rhythm: Normal rate and regular rhythm. Heart sounds: No murmur heard. Pulmonary:      Effort: Pulmonary effort is normal. No respiratory distress. Breath sounds: Normal breath sounds. Abdominal:      Palpations: Abdomen is soft. Tenderness: There is no abdominal tenderness. Musculoskeletal:         General: No swelling. Cervical back: Neck supple. Right lower leg: No edema. Left lower leg: No edema. Skin:     General: Skin is warm and dry.       Capillary Refill: Capillary refill takes less than 2 seconds. Neurological:      General: No focal deficit present. Mental Status: He is alert and oriented to person, place, and time. Psychiatric:         Mood and Affect: Mood normal.       Additional Data:     Labs:  Results from last 7 days   Lab Units 12/06/23  0440 12/05/23  0503 12/04/23  1233   WBC Thousand/uL 7.17   < > 9.63   HEMOGLOBIN g/dL 11.1*   < > 13.5   HEMATOCRIT % 33.0*   < > 41.3   PLATELETS Thousands/uL 127*   < > 169   NEUTROS PCT %  --   --  58   LYMPHS PCT %  --   --  30   MONOS PCT %  --   --  11   EOS PCT %  --   --  1    < > = values in this interval not displayed. Results from last 7 days   Lab Units 12/06/23  0440 12/05/23  0503 12/04/23  1233   SODIUM mmol/L 140   < > 137   POTASSIUM mmol/L 3.4*   < > 4.5   CHLORIDE mmol/L 109*   < > 104   CO2 mmol/L 24   < > 23   BUN mg/dL 20   < > 27*   CREATININE mg/dL 0.83   < > 1.44*   ANION GAP mmol/L 7   < > 10   CALCIUM mg/dL 8.1*   < > 9.0   ALBUMIN g/dL  --   --  4.2   TOTAL BILIRUBIN mg/dL  --   --  0.60   ALK PHOS U/L  --   --  88   ALT U/L  --   --  12   AST U/L  --   --  15   GLUCOSE RANDOM mg/dL 92   < > 269*    < > = values in this interval not displayed. Results from last 7 days   Lab Units 12/06/23  1111 12/06/23  0719 12/05/23  2047 12/05/23  1614 12/05/23  1151 12/05/23  0737 12/05/23  0428 12/04/23  2050   POC GLUCOSE mg/dl 142* 80 181* 245* 208* 80 81 270*               Lines/Drains:  Invasive Devices       Peripheral Intravenous Line  Duration             Peripheral IV 12/05/23 Distal;Dorsal (posterior); Left Forearm 1 day                      Telemetry:  Telemetry Orders (From admission, onward)               24 Hour Telemetry Monitoring  Continuous x 24 Hours (Telem)        Expiring   Question:  Reason for 24 Hour Telemetry  Answer:  Syncope suspected to be cardiac in origin                     Telemetry Reviewed: Normal Sinus Rhythm  Indication for Continued Telemetry Use: No indication for continued use. Will discontinue. Imaging: Reviewed radiology reports from this admission including: procedure reports and ECHO    Recent Cultures (last 7 days):         Last 24 Hours Medication List:   Current Facility-Administered Medications   Medication Dose Route Frequency Provider Last Rate    acetaminophen  650 mg Oral Q6H PRN Sherrie Marshall MD      apixaban  5 mg Oral BID Sherrie Marshall MD      aspirin  81 mg Oral Daily Sherrie Marshall MD      atorvastatin  10 mg Oral Daily Sherrie Marshall MD      calcitriol  0.25 mcg Oral Daily Sherrie Marshall MD      cyanocobalamin  100 mcg Oral Daily Sherrie Marshall MD      ferrous sulfate  325 mg Oral Daily With Brandy Wagoner MD      fludrocortisone  0.2 mg Oral Daily Julián Drone, DO      hydrocortisone  25 mg Oral QAM Julián Drone, DO      hydrocortisone  25 mg Oral Daily Julián Drone, DO      insulin glargine  18 Units Subcutaneous HS Sherrie Marshall MD      insulin lispro  1-5 Units Subcutaneous TID Deborah Umana MD      insulin lispro  1-5 Units Subcutaneous HS Sherrie Marshall MD      insulin lispro  6 Units Subcutaneous TID With Meals Sherrie Marshall MD      [START ON 12/7/2023] magnesium Oxide  400 mg Oral BID Kayleigh Fuentes, DO      magnesium Oxide  800 mg Oral BID David Perez MD      melatonin  3 mg Oral HS Terell Foster, DO      midodrine  15 mg Oral TID Deborah Umana MD      ondansetron  4 mg Intravenous Q6H PRN Sherrie Marshall MD      potassium chloride  20 mEq Oral BID Sherrie Marshall MD      risperiDONE  1 mg Oral BID Sherrie Marshall MD      sertraline  100 mg Oral Daily Sherrie Marshall MD      sodium chloride  1 g Oral TID Sherrie Marshall MD          Today, Patient Was Seen By: Sherrie Marshall MD    **Please Note: This note may have been constructed using a voice recognition system. **

## 2023-12-06 NOTE — CASE MANAGEMENT
Case Management Assessment & Discharge Planning Note    Patient name Dede Owens  Location S /S -01 MRN [id number]  : 1955 Date 2023       Current Admission Date: 2023  Current Admission Diagnosis:Orthostatic hypotension   Patient Active Problem List    Diagnosis Date Noted    Hypotension/syncope 11/10/2023    Fall 11/10/2023    Adrenal insufficiency ([address]) 11/10/2023    Orthostatic hypotension 11/10/2023    Type 1 diabetes mellitus with hypoglycemia ([address]) 11/10/2023    History of CVA (cerebrovascular accident) 11/10/2023    Paroxysmal atrial fibrillation ([address]) 11/10/2023    BERNA (acute kidney injury) ([address]) 2023    Spells of decreased attentiveness 2023    Cerebrovascular accident (CVA) ([address]) 2023    Iron deficiency anemia, unspecified 2023    Syncope 2023    Acute encephalopathy 2022    Unspecified protein-calorie malnutrition ([address]) 2022    Unintentional weight loss 2022    Type 2 MI (myocardial infarction) ([address]) 2022    Orthostatic hypotension 2022    Recurrent hypoglycemia 10/19/2022    T1DM (type 1 diabetes mellitus) ([address]) 10/19/2022    Adrenal insufficiency  10/19/2022    Paroxysmal atrial fibrillation  10/19/2022    History of stroke 10/19/2022    Anemia of chronic disease 10/19/2022    Psychiatric disorder 10/19/2022      LOS (days): 1  Geometric Mean LOS (GMLOS) (days): 2.40  Days to GMLOS:1.5     OBJECTIVE:  PATIENT READMITTED TO HOSPITAL  Risk of Unplanned Readmission Score: 30.67         Current admission status: Inpatient       Preferred Pharmacy:   [facility]  [address]  Phone: [phone] Fax: [address] [place], [place] - [address] [place] [address] Road,[address] [id number] [address]  Phone: [phone] Fax: [phone]    [id number] [place], [address] [facility] [address] [address]  Phone: [phone] Fax: [phone]    Primary Care Provider: Nicole Cabrera MD    Primary Insurance: [address] [facility]  Secondary Insurance:     ASSESSMENT:  Marga Proxies    There are no active Health Care Proxies on file. Patient Information  Admitted from[de-identified] Home  Mental Status: Alert  During Assessment patient was accompanied by: Not accompanied during assessment  Assessment information provided by[de-identified] Daughter  Primary Caregiver: Family  Caregiver's Name[de-identified] Cele Brunson- daughter  Caregiver's Relationship to Patient[de-identified] Family Member  Caregiver's Telephone Number[de-identified] [phone]  Support Systems: Spouse/significant other, Daughter, [facility] of Residence: [facility] [address] [place] [address] do you live in?: [address] entry access options. Select all that apply.: Stairs  Number of steps to enter home.: 5  Do the steps have railings?: Yes  Type of Current Residence: [address] [place] [address] home  Upon entering residence, is there a bedroom on the main floor (no further steps)?: Yes  Upon entering residence, is there a bathroom on the main floor (no further steps)?: Yes  Living Arrangements: Lives w/ Spouse/significant other, Lives w/ Extended Family    Activities of Daily Living Prior to Admission  Functional Status: Assistance  Completes ADLs independently?: No  Level of ADL dependence: Assistance  Ambulates independently?: Yes  Does patient use assisted devices?: Yes  Assisted Devices (DME) used:  Shower Chair, Walker  Does patient currently own DME?: Yes  What DME does the patient currently own?: Shower ChairDillan  Does patient have a history of Outpatient Therapy (PT/OT)?: No  Does the patient have a history of Short-Term Rehab?: Yes [facility])  Does patient have a history of HHC?: Yes ([facility])  Does patient currently have [address]?: No                   Means of Transportation  Means of Transport to [facility] [place]de[place]] Family transport      Housing Stability: Low Risk  (12/6/2023)    Housing Stability Vital Sign     Unable to Pay for Housing in the Last Year: No     Number of Places Lived in the Last Year: 1     Unstable Housing in the Last Year: No   Food Insecurity: No Food Insecurity (12/6/2023)    Hunger Vital Sign     Worried About Running Out of Food in the Last Year: Never true     Ran Out of Food in the Last Year: Never true   Transportation Needs: No Transportation Needs (12/6/2023)    PRAPARE - Transportation     Lack of Transportation (Medical): No     Lack of Transportation (Non-Medical): No   Utilities: Not At Risk (12/6/2023)    [facility] Utilities     Threatened with loss of utilities: No       DISCHARGE DETAILS:    Discharge planning discussed with[de-identified] patient's dtr Ofelia  [place] of Choice: Yes  Comments - Freedom of Choice: CM s/w pt's dtr Ofelia via phone re: assessment and dcp. Patient lives with SO and his sister in two story home, [address] front entry and 3 [address] back entry, [address] [place] [address]. Patient receives assistance from dtr and private caregiver for ADLS. Private caregiver visits for a few hours every morning to assist with bathing and dressing, dtr assists as needed otherwise. Pt utilizes RW to ambulate. Family provides transport to appointments. Dtr reported pt hx of STR with [facility] with [id number] - both would be preference if either are rcmded but dtr is open to blanket referral. PT/OT rcmd STR. STR referrals sent via aidin, awaiting responses.   CM contacted family/caregiver?: Yes  Were Treatment Team discharge recommendations reviewed with patient/caregiver?: Yes  Did patient/caregiver verbalize understanding of patient care needs?: N/A- going to facility  Were patient/caregiver advised of the risks associated with not following Treatment Team discharge recommendations?: Yes    Contacts  Patient Contacts: Daughter- Ramon Boot  Relationship to Patient[de-identified] Family  Contact Method: Phone  Phone Number: [phone]  Reason/Outcome: Emergency [address]         Is the patient interested in [facility] at discharge?: No    DME Referral Provided  Referral made for DME?: No    Other Referral/Resources/Interventions Provided:  Referral Comments: PT/OT rcmd STR. STR blanket referral sent via aidin, awaiting responses. Pt preference is for [facility] if available.          Treatment Team Recommendation: Short Term Rehab  Discharge Destination Plan[de-identified] Short Term Rehab

## 2023-12-06 NOTE — ASSESSMENT & PLAN NOTE
Syncopal episode the morning of this admission. No head strike. Noted to be diaphoretic, bluish colored lips, and urinary incontinence. Reportedly regained consciousness within 1 minute, but blank stare and slurred speech were noted over the next hour. Additional episodes of blank stare later in the day prior to arrival in ED. Notably, hospitalized for similar symptoms after syncopal episode on 11/10/2023. PMH of orthostatic hypotension (on midodrine), T1DM, and adrenal insufficiency (on hydrocortisone and fludrocortisone)  Orthostatic vitals positive in the /62 lying, 96/51 sitting  CXR - no acute cardiopulmonary disease  CT head - no acute abnormalities, stable microangiopathic changes  ECG - NSR, rate 71, T wave inversions in inferior and lateral leads  Etiology: orthostatic vs cardiogenic syncope. Possible component of adrenal insufficiency or autonomic dysfunction secondary to T1DM.  Less suspicion for TIA or seizure   Echocardiogram - LVEF 60%  EEG unrevealing    Plan  Continue PTA midodrine  Continue hydrocortisone 25 mg BID and fludrocortisone 0.2 mg daily  PT/OT eval and treat  Neurology consulted, appreciate recommendations  If syncopal episode , check STAT prolactin within 15 minutes  Prolonged ambulatory EEG vs inpatient EMU admission for spell capture and characterization  No antiepileptic medications at this time

## 2023-12-06 NOTE — ASSESSMENT & PLAN NOTE
Lab Results   Component Value Date    HGBA1C 8.8 (H) 11/11/2023       Recent Labs     12/05/23  1614 12/05/23  2047 12/06/23  0719 12/06/23  1111   POCGLU 245* 181* 80 142*         Blood Sugar Average: Last 72 hrs:  (P) 160.875    PTA Toujeo 20 units at bedtime, lispro 6 units at mealtimes  Brittle diabetes    Plan  Continue home regimen  Do not hold basal insulin  SSI  Accuchecks  Hypoglycemia protocol  Carbohydrate controlled diet

## 2023-12-06 NOTE — PHYSICAL THERAPY NOTE
Physical Therapy Evaluation    Patient's Name: Awilda Rodriguez    Admitting Diagnosis  Syncope [R55]  Weakness [R53.1]  Hypotension [I95.9]  Difficulty walking [R26.2]  BERNA (acute kidney injury) ([address]) [N17.9]    Problem List  Patient Active Problem List   Diagnosis    Hypotension/syncope    Fall    Adrenal insufficiency ([address])    Orthostatic hypotension    Type 1 diabetes mellitus with hypoglycemia ([address])    History of CVA (cerebrovascular accident)    Paroxysmal atrial fibrillation (HCC)    Recurrent hypoglycemia    T1DM (type 1 diabetes mellitus) ([address])    Adrenal insufficiency     Paroxysmal atrial fibrillation     History of stroke    Anemia of chronic disease    Psychiatric disorder    Type 2 MI (myocardial infarction) (HCC)    Orthostatic hypotension    Unintentional weight loss    Unspecified protein-calorie malnutrition (HCC)    Acute encephalopathy    Syncope    Iron deficiency anemia, unspecified    Cerebrovascular accident (CVA) ([address])    Spells of decreased attentiveness    BERNA (acute kidney injury) ([address])       Past Medical History  Past Medical History:   Diagnosis Date    A-fib ([address])     Adrenal insufficiency (HCC)     Atrial fibrillation ([address])     Diabetes mellitus ([address])     Obesity, morbid ([address]) 11/14/2022    Stroke [facility])        Past Surgical History  History reviewed. No pertinent surgical history. 12/06/23 0909   Note Type   Note type Evaluation   Pain Assessment   Pain Assessment Tool 0-10   Pain Score No Pain   Restrictions/Precautions   Weight Bearing Precautions Per Order No   Other Precautions Cognitive; Chair Alarm; Bed Alarm; Fall Risk;Multiple lines;Telemetry  (+ orthostatics)   Home Living   Type of [address] [place] [address] Two level;1/2 bath on main level;Stairs to enter with rails  (5 RYAN)   Bathroom Shower/Tub Walk-in shower   Bathroom Toilet Standard   Bathroom Equipment Shower chair   [address]   Additional Comments per pt he maintains a FFSU in bed room with hospital bed   Prior Function   Lives With Significant other   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 reports mod I with RW for mobility   General   Family/Caregiver Present No   Cognition   Overall Cognitive Status Impaired   Orientation Level Oriented to person;Oriented to place; Disoriented to situation;Disoriented to time   Memory Decreased short term memory;Decreased recall of recent events;Decreased recall of precautions   Following Commands Follows one step commands with increased time or repetition   Comments pt ID by wristband, name and    Subjective   Subjective pt seated EOB, agreeable to PT eval, pt denies light headedness/dizziness at rest   RLE Assessment   RLE Assessment X  (grossly 3+/5, pt with poor command following of MMT testing)   LLE Assessment   LLE Assessment X  (grossly 3+/5, pt with poor command following of MMT testing)   Bed Mobility   Supine to Sit Unable to assess   Sit to Supine 4  Minimal assistance   Additional items Assist x 1; Increased time required;LE management;HOB elevated   Additional Comments pt returned to supine post + orthostatic hypotension x2, returned to supine with BP of 123/58   Transfers   Sit to Stand 4  Minimal assistance   Additional items Assist x 1; Increased time required;Verbal cues   Stand to Sit 4  Minimal assistance   Additional items Assist x 1; Increased time required;Verbal cues   Additional Comments initally pt denies light headedness/dizziness upon standing. however while taking BPs, pt with posterior LOB, pt denies dizziness, returned to seated EOB. pt BP then taken, and attempted STS again with orthostatic measurements, however pt with posterior LOB, unable to correct, requires PT mod x 1 assist to correct.  during second attempt pt states feeling slightly light headed, continues to deny dizziness   Balance   Static Sitting Fair +   Dynamic Sitting Fair   Static Standing Poor +   Dynamic Standing Poor   Activity Tolerance   Activity Tolerance Patient limited by fatigue  (+ orthostatics)   Medical Staff Made Aware spoke with OT, ALEXANDR   Nurse Made Aware AMBROSIO ingram pre/post   Assessment   Prognosis Fair   Problem List Decreased strength;Decreased endurance; Impaired balance;Decreased mobility; Decreased cognition; Impaired judgement;Decreased safety awareness  (+ orthostatic hypotension)   Assessment Pt is a 79 y.o. male seen for PT evaluation s/p admit to [address] [place] [address] on 12/4/2023. Pt was admitted with a primary dx of: syncope, weakness, hypotension, BERNA. PT now consulted for assessment of mobility and d/c needs. Pt with Up and OOB as tolerated orders. Pts current comorbidities and personal factors effecting treatment include: history of falls, afib, BMI, history of stroke, CVA. Pts current clinical presentation is Unstable/Unpredictable (high complexity) due to Ongoing medical management for primary dx, Decreased activity tolerance compared to baseline, Fall risk, Increased assistance needed from caregiver at current time, Ongoing telemetry monitoring, Cog status, positive and symptomatic orthostatic hypotension . Prior to admission, pt was independent with use of RW. Upon evaluation, pt currently is requiring Shon for bed mobility; Shon for transfers. Pt presents at PT eval functioning below baseline and currently w/ overall mobility deficits 2* to: BLE weakness, impaired balance, gait deviations, decreased activity tolerance compared to baseline, decreased functional mobility tolerance compared to baseline, decreased safety awareness, impaired judgement, fall risk, decreased cognition. Pt currently at a fall risk 2* to impairments listed above.   Pt will continue to benefit from skilled acute PT interventions to address stated impairments; to maximize functional mobility; for ongoing pt/ family training; and DME needs. At conclusion of PT session pt returned BTB, bed alarm engaged, all needs in reach, and RN notified of session findings/recommendations with phone and call bell within reach. Pt denies any further questions at this time. Recommend Level II (Moderate Resource Intensity)  upon hospital D/C. Goals   Patient Goals no therapy goals stated by pt today   STG Expiration Date 12/16/23   Short Term Goal #1 In 10 days pt will be able to: 1. Demonstrate ability to perform all aspects of bed mobility independently to improve functional safety. 2. Perform functional transfers independently to facilitate safe return to previous living environment. 3.  Ambulate 150 ft with LRAD independently with stable vitals to improve safety with household distances and reduce fall risk. 4. Improve LE strength grades by 1 to increase ease of functional mobility with transfers and gait. 5. Pt will demonstrate improved balance by one grade in order to decrease risk of falls. 6. Climb at least 5 steps with 1 HR independently to simulate entrance to home. PT Treatment Day 0   Plan   Treatment/Interventions Functional transfer training;LE strengthening/ROM; Therapeutic exercise;Cognitive reorientation;Equipment eval/education;Patient/family training;Bed mobility;Gait training;Spoke to nursing;Spoke to case management;OT   PT Frequency 3-5x/wk   Discharge Recommendation   Rehab Resource Intensity Level, PT II (Moderate Resource Intensity)   Equipment Recommended   (per pt he has RW)   AM-PAC Basic Mobility Inpatient   Turning in Flat Bed Without Bedrails 2   Lying on Back to Sitting on Edge of Flat Bed Without Bedrails 2   Moving Bed to Chair 3   Standing Up From Chair Using Arms 3   Walk in Room 2   Climb 3-5 Stairs With Railing 1   Basic Mobility Inpatient Raw Score 13   Basic Mobility Standardized Score 33.99   Highest Level Of Mobility   -HLM Goal 4: Move to chair/commode   JH-HLM Achieved 5: Stand (1 or more minutes)   End of Consult   Patient Position at End of Consult Supine; All needs within reach;Bed/Chair alarm activated   The patient's AM-PAC Basic Mobility Inpatient Short Form Raw Score is 13. A Raw score of less than or equal to 16 suggests the patient may benefit from discharge to post-acute rehabilitation services. Please also refer to the recommendation of the Physical Therapist for safe discharge planning.     Sylvester Hong, PT    Vitals:    12/06/23 0852 12/06/23 0854 12/06/23 0856 12/06/23 0901   BP: 96/54 (!) 84/57 (!) 92/46 84/57   Pulse: 67 72 69 66   Patient Position - Orthostatic VS: Seated EOB Standing Seated EOB Standing   Supine 123/58

## 2023-12-06 NOTE — PROGRESS NOTES
Progress Note - Neurology   Kristy Wolfe 79 y.o. male MRN: [id number]  Unit/Bed#: S -01 Encounter: [id number]      Assessment/Plan   * Syncope  Assessment & Plan  Kristy Wolfe is a 79 y.o. male with prior stroke on aspirin, Afib on Eliquis, DM, obesity, history of orthostatic hypotension, who presented to the ED on 12/4 after having a syncopal episode at home consisting of becoming diaphoretic, cyanotic lips, and urinary incontinence that was witnessed by patient's caregiver. No seizure-like activity was noted, but when patient regained consciousness, he had a blank stare and was not responding to questions. Of note, patient has been having urinary incontinence x 1 week and was referred to Urology by his PCP. Family reports he has been having multiple syncopal episodes over the last several weeks. Neurology consulted for further evaluation. Of note, patient noted to have positive ortho BPs and also an episode of hypotension with BP 67/40 and hypoxia 85%. Workup:  - CT head:   "No acute intracranial abnormality. Stable chronic microangiopathic changes within the brain."  - Repeat CT head:   "No acute intracranial abnormality. "  - Echo: EF 60%. No regional wall motion abnormality. Bilateral atrium size normal.  - Creatinine elevated on presentation, 1.44  - Ortho BPs: lying 132/62, sitting 96/51, standing 93/55  - Routine EEG:  "This abnormal study is consistent with a very mild generalized non-specific encephalopathy. No electrographic seizures, interictal epileptiform discharges (seizure tendency) or focal slowing were seen. No diagnostic clinical events were captured."    Semiology of reported episode of LOC fits with syncope rather than seizure. Prior seizure work-up unremarkable. Routine EEG this admission unremarkable for evidence of electrographic seizure or epileptiform discharges. Etiology of reported staring spells remains unclear.   Patient will likely need a prolonged ambulatory EEG or future inpatient EMU admission. Plan:  - No further neuroimaging/testing needed at this time  - Ambulatory 48-hour EEG ordered in March 2023, recommend completing the study prior to follow-up with outpatient neurology  - Check prolactin STAT within 15 minutes of event occurring if patient has recurrence while admitted  - Fall precautions  - No need for [employer] form as patient does not drive  - Telemetry  - PT/OT  - Monitor neuro exam; notify with any changes  - Medical management and supportive care per primary team. Correction of any metabolic or infectious disturbances. Orthostatic hypotension  Assessment & Plan  - See ortho BPs as noted above  - Currently on Florinef and Midodrine  - Recommend thigh-high NATALIO stockings  - Medical management per primary team    BERNA (acute kidney injury) ([address])  Assessment & Plan  - Creatinine on presentation 1.44  - Medical management per primary team    History of stroke  Assessment & Plan  - Continue on aspirin and statin  - Monitor neuro exam; notify with any changes    Paroxysmal atrial fibrillation (HCC)  Assessment & Plan  - Continue Eliquis 5 mg BID  - Telemetry  - Medical management per primary team      Shayna Gonzalez will need follow up in 8 weeks with epilepsy attending or advance practitioner. He will require a ambulatory EEG within 4 weeks. Subjective: Today patient states he feels fine. Patient denies any episodes of LOC or staring spells during this admission. Denies chest pain, shortness of breath, abdominal pain, nausea, vomiting, headache, visual changes, weakness, numbness/tingling. Previous ambulatory 48-hour EEG ordered on 3/30/2023, recommend patient completes the study prior to follow-up with outpatient neurology. ROS:  12 point ROS performed, as stated above, all others negative. Vitals: Blood pressure (!) 69/44, pulse 88, temperature 98.2 °F (36.8 °C), temperature source Oral, resp.  rate 20, height 5' 4" (1.626 m), weight 67.6 kg (149 lb), SpO2 91 %.,Body mass index is 25.58 kg/m². Physical Exam  Vitals and nursing note reviewed. Constitutional:       General: He is not in acute distress. Appearance: He is normal weight. He is not ill-appearing, toxic-appearing or diaphoretic. HENT:      Head: Normocephalic and atraumatic. Eyes:      General: No scleral icterus. Right eye: No discharge. Left eye: No discharge. Extraocular Movements: Extraocular movements intact. Conjunctiva/sclera: Conjunctivae normal.   Skin:     General: Skin is warm and dry. Psychiatric:      Comments: Abnormal affect, appears flat/withdrawn       Neurologic Exam     Mental Status   Patient is alert, sitting up in bed watching TV. Oriented to person and place. Able to state the current month, however unable to name the year. Incorrectly states the year is 2000. Able to name objects provided, follow central and appendicular commands, and answers questions appropriately. No dysarthria noted. Cranial Nerves   Primary gaze midline, conjugate gaze noted  Limited horizontal and vertical eye movements, unable to reach and gaze  No evidence of nystagmus  No visual field deficits noted  No facial asymmetry noted  Tongue midline, no laceration     Motor Exam   Muscle bulk: normal  Overall muscle tone: normal  Able to elevate BUE off the bed and maintain antigravity without drift  Bilateral upper extremity strength testing 5/5 throughout  Able to elevate BLE off the bed and maintain antigravity without drift  Lateral hip flexion, plantarflexion, and dorsiflexion strength 5/5 throughout     Sensory Exam   Light touch normal.     Gait, Coordination, and Reflexes     Tremor   Resting tremor: absent  No ataxia or dysmetria bilateral upper extremity finger-nose testing    No involuntary movements or rhythmic seizure-like activity noted throughout exam       Lab Results: I have personally reviewed pertinent reports.   Recent Results (from the past 24 hour(s)) Fingerstick Glucose (POCT)    Collection Time: 12/05/23  4:14 PM   Result Value Ref Range    POC Glucose 245 (H) 65 - 140 mg/dl   Fingerstick Glucose (POCT)    Collection Time: 12/05/23  8:47 PM   Result Value Ref Range    POC Glucose 181 (H) 65 - 140 mg/dl   CBC and Platelet    Collection Time: 12/06/23  4:40 AM   Result Value Ref Range    WBC 7.17 4.31 - 10.16 Thousand/uL    RBC 3.42 (L) 3.88 - 5.62 Million/uL    Hemoglobin 11.1 (L) 12.0 - 17.0 g/dL    Hematocrit 33.0 (L) 36.5 - 49.3 %    MCV 97 82 - 98 fL    MCH 32.5 26.8 - 34.3 pg    MCHC 33.6 31.4 - 37.4 g/dL    RDW 14.6 11.6 - 15.1 %    Platelets 870 (L) 138 - 390 Thousands/uL    MPV 11.3 8.9 - 12.7 fL   Basic metabolic panel    Collection Time: 12/06/23  4:40 AM   Result Value Ref Range    Sodium 140 135 - 147 mmol/L    Potassium 3.4 (L) 3.5 - 5.3 mmol/L    Chloride 109 (H) 96 - 108 mmol/L    CO2 24 21 - 32 mmol/L    ANION GAP 7 mmol/L    BUN 20 5 - 25 mg/dL    Creatinine 0.83 0.60 - 1.30 mg/dL    Glucose 92 65 - 140 mg/dL    Calcium 8.1 (L) 8.4 - 10.2 mg/dL    eGFR 91 ml/min/1.73sq m   Magnesium    Collection Time: 12/06/23  4:40 AM   Result Value Ref Range    Magnesium 1.5 (L) 1.9 - 2.7 mg/dL   Fingerstick Glucose (POCT)    Collection Time: 12/06/23  7:19 AM   Result Value Ref Range    POC Glucose 80 65 - 140 mg/dl   Fingerstick Glucose (POCT)    Collection Time: 12/06/23 11:11 AM   Result Value Ref Range    POC Glucose 142 (H) 65 - 140 mg/dl   ]  Imaging Studies: I have personally reviewed pertinent reports and I have personally reviewed pertinent films in PACS. EKG, Pathology, and Other Studies: I have personally reviewed pertinent reports. VTE Prophylaxis: Eliquis    Counseling / Coordination of Care  Total time spent today 33 minutes. Greater than 50% of total time was spent with the patient and/or family counseling and/or coordination of care. A description of the counseling/coordination of care:  Patient was seen and evaluated.   Discussed with attending. Chart reviewed thoroughly including laboratory and imaging studies. Plan of care discussed with patient and primary team.  Discussed routine EEG results with patient as well as plan to complete outpatient ambulatory EEG study. Dictation voice to text software has been used in the creation of this document. Please consider this in light of any contextual or grammatical errors.

## 2023-12-06 NOTE — CASE MANAGEMENT
[facility] received request for authorization from Care Manager.   Authorization request for: SNF  Facility Name: [facility] NPI: [id number]   Facility MD: Krystal Harper  NPI: [id number]  Authorization initiated by contacting insurance: Viral Bain: Christina Rolle submitted via: fax # [phone]  Pending Reference #: [id number]

## 2023-12-06 NOTE — PLAN OF CARE
Problem: PHYSICAL THERAPY ADULT  Goal: Performs mobility at highest level of function for planned discharge setting. See evaluation for individualized goals. Description: Treatment/Interventions: Functional transfer training, LE strengthening/ROM, Therapeutic exercise, Cognitive reorientation, Equipment eval/education, Patient/family training, Bed mobility, Gait training, Spoke to nursing, Spoke to case management, OT  Equipment Recommended:  (per pt he has RW)       See flowsheet documentation for full assessment, interventions and recommendations. Note: Prognosis: Fair  Problem List: Decreased strength, Decreased endurance, Impaired balance, Decreased mobility, Decreased cognition, Impaired judgement, Decreased safety awareness (+ orthostatic hypotension)  Assessment: Pt is a 79 y.o. male seen for PT evaluation s/p admit to [facility] on 12/4/2023. Pt was admitted with a primary dx of: syncope, weakness, hypotension, BERNA. PT now consulted for assessment of mobility and d/c needs. Pt with Up and OOB as tolerated orders. Pts current comorbidities and personal factors effecting treatment include: history of falls, afib, BMI, history of stroke, CVA. Pts current clinical presentation is Unstable/Unpredictable (high complexity) due to Ongoing medical management for primary dx, Decreased activity tolerance compared to baseline, Fall risk, Increased assistance needed from caregiver at current time, Ongoing telemetry monitoring, Cog status, positive and symptomatic orthostatic hypotension . Prior to admission, pt was independent with use of RW. Upon evaluation, pt currently is requiring Shon for bed mobility; Shon for transfers.  Pt presents at PT eval functioning below baseline and currently w/ overall mobility deficits 2* to: BLE weakness, impaired balance, gait deviations, decreased activity tolerance compared to baseline, decreased functional mobility tolerance compared to baseline, decreased safety awareness, impaired judgement, fall risk, decreased cognition. Pt currently at a fall risk 2* to impairments listed above. Pt will continue to benefit from skilled acute PT interventions to address stated impairments; to maximize functional mobility; for ongoing pt/ family training; and DME needs. At conclusion of PT session pt returned BTB, bed alarm engaged, all needs in reach, and RN notified of session findings/recommendations with phone and call bell within reach. Pt denies any further questions at this time. Recommend Level II (Moderate Resource Intensity)  upon hospital D/C. Rehab Resource Intensity Level, PT: II (Moderate Resource Intensity)    See flowsheet documentation for full assessment.

## 2023-12-06 NOTE — CASE MANAGEMENT
Case Management Progress Note    Patient name Jillian Gonzalez  Location S /S -01 MRN [id number]  : 1955 Date 2023       LOS (days): 1  Geometric Mean LOS (GMLOS) (days): 2.40  Days to GMLOS:1.5        OBJECTIVE:        Current admission status: Inpatient  Preferred Pharmacy:   [address]  Phone: [phone] Fax: [address] [address]  Phone: [phone] Fax: [phone]    [id number]  [address] [place] [address]  Phone: [phone] Fax: [phone]    Primary Care Provider: Jasper Aguilar MD    Primary Insurance: [address] [facility] REP  Secondary Insurance:     PROGRESS NOTE:    Response received from [facility] (pt/family's preferred choice), confirmed bed available for STR and reserved in aidin.

## 2023-12-06 NOTE — QUICK NOTE
Glucose and vital sign trends reviewed  B12 has resulted normal range 488  Do not believe that blood pressure fluctuations are due to patient's adrenal insufficiency  Suspect underlying dysautonomia    Continue current diabetic and AI regimen while hospitalized    Discharge recommendations:  -Resume adrenal insufficiency regimen- home hydrocortisone 20mg a.m., 15 mg p.m., fludrocortisone 0.2 mg daily  -Continue home diabetic regimen of 18 units of Toujeo nightly, lispro 6 units 3 times daily before every meal  -Should follow-up with his endocrinologist Dr. Pastor Vega

## 2023-12-07 VITALS
OXYGEN SATURATION: 98 % | RESPIRATION RATE: 18 BRPM | WEIGHT: 149 LBS | DIASTOLIC BLOOD PRESSURE: 60 MMHG | SYSTOLIC BLOOD PRESSURE: 109 MMHG | HEIGHT: 64 IN | BODY MASS INDEX: 25.44 KG/M2 | HEART RATE: 77 BPM | TEMPERATURE: 98.1 F

## 2023-12-07 PROBLEM — E87.6 HYPOKALEMIA: Status: ACTIVE | Noted: 2023-12-07

## 2023-12-07 PROBLEM — E83.42 HYPOMAGNESEMIA: Status: ACTIVE | Noted: 2023-12-07

## 2023-12-07 LAB
ANION GAP SERPL CALCULATED.3IONS-SCNC: 7 MMOL/L
BUN SERPL-MCNC: 21 MG/DL (ref 5–25)
CALCIUM SERPL-MCNC: 8.6 MG/DL (ref 8.4–10.2)
CHLORIDE SERPL-SCNC: 110 MMOL/L (ref 96–108)
CO2 SERPL-SCNC: 24 MMOL/L (ref 21–32)
CREAT SERPL-MCNC: 0.86 MG/DL (ref 0.6–1.3)
ERYTHROCYTE [DISTWIDTH] IN BLOOD BY AUTOMATED COUNT: 14.6 % (ref 11.6–15.1)
GFR SERPL CREATININE-BSD FRML MDRD: 90 ML/MIN/1.73SQ M
GLUCOSE SERPL-MCNC: 109 MG/DL (ref 65–140)
GLUCOSE SERPL-MCNC: 125 MG/DL (ref 65–140)
GLUCOSE SERPL-MCNC: 142 MG/DL (ref 65–140)
HCT VFR BLD AUTO: 34.3 % (ref 36.5–49.3)
HGB BLD-MCNC: 11.4 G/DL (ref 12–17)
MAGNESIUM SERPL-MCNC: 1.6 MG/DL (ref 1.9–2.7)
MCH RBC QN AUTO: 31.8 PG (ref 26.8–34.3)
MCHC RBC AUTO-ENTMCNC: 33.2 G/DL (ref 31.4–37.4)
MCV RBC AUTO: 96 FL (ref 82–98)
PLATELET # BLD AUTO: 122 THOUSANDS/UL (ref 149–390)
PMV BLD AUTO: 11.2 FL (ref 8.9–12.7)
POTASSIUM SERPL-SCNC: 3.4 MMOL/L (ref 3.5–5.3)
RBC # BLD AUTO: 3.59 MILLION/UL (ref 3.88–5.62)
SODIUM SERPL-SCNC: 141 MMOL/L (ref 135–147)
WBC # BLD AUTO: 8.05 THOUSAND/UL (ref 4.31–10.16)

## 2023-12-07 PROCEDURE — 80048 BASIC METABOLIC PNL TOTAL CA: CPT

## 2023-12-07 PROCEDURE — 85027 COMPLETE CBC AUTOMATED: CPT

## 2023-12-07 PROCEDURE — 83735 ASSAY OF MAGNESIUM: CPT

## 2023-12-07 PROCEDURE — 99239 HOSP IP/OBS DSCHRG MGMT >30: CPT | Performed by: INTERNAL MEDICINE

## 2023-12-07 PROCEDURE — 82948 REAGENT STRIP/BLOOD GLUCOSE: CPT

## 2023-12-07 RX ORDER — POTASSIUM CHLORIDE 20 MEQ/1
20 TABLET, EXTENDED RELEASE ORAL 2 TIMES DAILY
Status: COMPLETED | OUTPATIENT
Start: 2023-12-07 | End: 2023-12-07

## 2023-12-07 RX ORDER — LANOLIN ALCOHOL/MO/W.PET/CERES
400 CREAM (GRAM) TOPICAL 2 TIMES DAILY
Refills: 0
Start: 2023-12-07

## 2023-12-07 RX ORDER — POTASSIUM CHLORIDE 20 MEQ/1
20 TABLET, EXTENDED RELEASE ORAL 2 TIMES DAILY
Status: DISCONTINUED | OUTPATIENT
Start: 2023-12-07 | End: 2023-12-07

## 2023-12-07 RX ORDER — MAGNESIUM SULFATE HEPTAHYDRATE 40 MG/ML
2 INJECTION, SOLUTION INTRAVENOUS ONCE
Status: COMPLETED | OUTPATIENT
Start: 2023-12-07 | End: 2023-12-07

## 2023-12-07 RX ORDER — INSULIN GLARGINE 300 U/ML
18 INJECTION, SOLUTION SUBCUTANEOUS
Start: 2023-12-07

## 2023-12-07 RX ADMIN — FLUDROCORTISONE ACETATE 0.2 MG: 0.1 TABLET ORAL at 08:44

## 2023-12-07 RX ADMIN — RISPERIDONE 1 MG: 1 TABLET ORAL at 08:42

## 2023-12-07 RX ADMIN — ATORVASTATIN CALCIUM 10 MG: 10 TABLET, FILM COATED ORAL at 08:42

## 2023-12-07 RX ADMIN — Medication 100 MCG: at 08:42

## 2023-12-07 RX ADMIN — FERROUS SULFATE TAB 325 MG (65 MG ELEMENTAL FE) 325 MG: 325 (65 FE) TAB at 08:42

## 2023-12-07 RX ADMIN — POTASSIUM CHLORIDE 20 MEQ: 1500 TABLET, EXTENDED RELEASE ORAL at 14:43

## 2023-12-07 RX ADMIN — CALCITRIOL 0.25 MCG: 0.25 CAPSULE, LIQUID FILLED ORAL at 08:45

## 2023-12-07 RX ADMIN — MAGNESIUM SULFATE HEPTAHYDRATE 2 G: 40 INJECTION, SOLUTION INTRAVENOUS at 08:54

## 2023-12-07 RX ADMIN — SERTRALINE 100 MG: 100 TABLET, FILM COATED ORAL at 08:42

## 2023-12-07 RX ADMIN — HYDROCORTISONE 25 MG: 5 TABLET ORAL at 08:44

## 2023-12-07 RX ADMIN — INSULIN LISPRO 6 UNITS: 100 INJECTION, SOLUTION INTRAVENOUS; SUBCUTANEOUS at 08:49

## 2023-12-07 RX ADMIN — Medication 400 MG: at 08:42

## 2023-12-07 RX ADMIN — MIDODRINE HYDROCHLORIDE 15 MG: 5 TABLET ORAL at 12:01

## 2023-12-07 RX ADMIN — APIXABAN 5 MG: 5 TABLET, FILM COATED ORAL at 08:42

## 2023-12-07 RX ADMIN — INSULIN LISPRO 6 UNITS: 100 INJECTION, SOLUTION INTRAVENOUS; SUBCUTANEOUS at 12:01

## 2023-12-07 RX ADMIN — SODIUM CHLORIDE 1 G: 1 TABLET ORAL at 08:42

## 2023-12-07 RX ADMIN — ASPIRIN 81 MG CHEWABLE TABLET 81 MG: 81 TABLET CHEWABLE at 08:42

## 2023-12-07 NOTE — CASE MANAGEMENT
Case Management Discharge Planning Note    Patient name Suresh Elizondo  Location S /S -01 MRN [id number]  : 1955 Date 2023       Current Admission Date: 2023  Current Admission Diagnosis:Syncope   Patient Active Problem List    Diagnosis Date Noted    Hypotension/syncope 11/10/2023    Fall 11/10/2023    Adrenal insufficiency ([address]) 11/10/2023    Orthostatic hypotension 11/10/2023    Type 1 diabetes mellitus with hypoglycemia ([address]) 11/10/2023    History of CVA (cerebrovascular accident) 11/10/2023    Paroxysmal atrial fibrillation ([address]) 11/10/2023    BERNA (acute kidney injury) ([address]) 2023    Spells of decreased attentiveness 2023    Cerebrovascular accident (CVA) ([address]) 2023    Iron deficiency anemia, unspecified 2023    Syncope 2023    Acute encephalopathy 2022    Unspecified protein-calorie malnutrition ([address]) 2022    Unintentional weight loss 2022    Type 2 MI (myocardial infarction) ([address]) 2022    Orthostatic hypotension 2022    Recurrent hypoglycemia 10/19/2022    T1DM (type 1 diabetes mellitus) ([address]) 10/19/2022    Adrenal insufficiency  10/19/2022    Paroxysmal atrial fibrillation  10/19/2022    History of stroke 10/19/2022    Anemia of chronic disease 10/19/2022    Psychiatric disorder 10/19/2022      LOS (days): 2  Geometric Mean LOS (GMLOS) (days): 2.40  Days to GMLOS:0.4     OBJECTIVE:  Risk of Unplanned Readmission Score: 27.41         Current admission status: Inpatient   Preferred Pharmacy:   [facility]  [address]  Phone: [phone] Fax: [facility] [address] [facility] [address] [facility]  [address]  Phone: [phone] Fax: [phone]    [facility] [address] [id number]  Phone: [phone] Fax: [phone]    Primary Care Provider: Chaparro Wharton MD    Primary Insurance: [address] [facility] REP  Secondary Insurance:     [address]  Number: [id number]

## 2023-12-07 NOTE — ASSESSMENT & PLAN NOTE
Continue home regimen Eliquis   Not on rate-controlling medications due to chronic hypotension  Follow-up with Cardiology

## 2023-12-07 NOTE — PLAN OF CARE
Problem: Potential for Falls  Goal: Patient will remain free of falls  Description: INTERVENTIONS:  - Educate patient/family on patient safety including physical limitations  - Instruct patient to call for assistance with activity   - Consult OT/PT to assist with strengthening/mobility   - Keep Call bell within reach  - Keep bed low and locked with side rails adjusted as appropriate  - Keep care items and personal belongings within reach  - Initiate and maintain comfort rounds  - Make Fall Risk Sign visible to staff  - Offer Toileting every  Hours, in advance of need  - Initiate/Maintain alarm  - Obtain necessary fall risk management equipment:   - Apply yellow socks and bracelet for high fall risk patients  - Consider moving patient to room near nurses station  Outcome: Progressing     Problem: CARDIOVASCULAR - ADULT  Goal: Maintains optimal cardiac output and hemodynamic stability  Description: INTERVENTIONS:  - Monitor I/O, vital signs and rhythm  - Monitor for S/S and trends of decreased cardiac output  - Administer and titrate ordered vasoactive medications to optimize hemodynamic stability  - Assess quality of pulses, skin color and temperature  - Assess for signs of decreased coronary artery perfusion  - Instruct patient to report change in severity of symptoms  Outcome: Progressing  Goal: Absence of cardiac dysrhythmias or at baseline rhythm  Description: INTERVENTIONS:  - Continuous cardiac monitoring, vital signs, obtain 12 lead EKG if ordered  - Administer antiarrhythmic and heart rate control medications as ordered  - Monitor electrolytes and administer replacement therapy as ordered  Outcome: Progressing     Problem: Prexisting or High Potential for Compromised Skin Integrity  Goal: Skin integrity is maintained or improved  Description: INTERVENTIONS:  - Identify patients at risk for skin breakdown  - Assess and monitor skin integrity  - Assess and monitor nutrition and hydration status  - Monitor labs   - Assess for incontinence   - Turn and reposition patient  - Assist with mobility/ambulation  - Relieve pressure over bony prominences  - Avoid friction and shearing  - Provide appropriate hygiene as needed including keeping skin clean and dry  - Evaluate need for skin moisturizer/barrier cream  - Collaborate with interdisciplinary team   - Patient/family teaching  - Consider wound care consult   Outcome: Progressing

## 2023-12-07 NOTE — DISCHARGE SUMMARY
[address] 1955, 79 y.o. male MRN: [id number]  Unit/Bed#: S -01 Encounter: [id number]  Primary Care Provider: Zak Love MD   Date and time admitted to hospital: 12/4/2023 12:05 PM    * Syncope  Assessment & Plan  Syncopal episode the morning of this admission. No head strike. Noted to be diaphoretic, bluish colored lips, and urinary incontinence. Reportedly regained consciousness within 1 minute, but blank stare and slurred speech were noted over the next hour. Additional episodes of blank stare later in the day prior to arrival in ED. Notably, hospitalized for similar symptoms after syncopal episode on 11/10/2023. PMH of orthostatic hypotension (on midodrine), T1DM, and adrenal insufficiency (on hydrocortisone and fludrocortisone)  Orthostatic vitals positive in the /62 lying, 96/51 sitting  CXR - no acute cardiopulmonary disease  CT head - no acute abnormalities, stable microangiopathic changes  ECG - NSR, rate 71, T wave inversions in inferior and lateral leads  Etiology: orthostatic vs cardiogenic syncope. Possible component of adrenal insufficiency or autonomic dysfunction secondary to T1DM.  Less suspicion for TIA or seizure   Echocardiogram - LVEF 60%  EEG unrevealing    Plan  Patient deemed medically stable for discharge to [facility]  Continue home regimen midodrine  Continue home regimen hydrocortisone and fludrocortisone  Follow-up with outpatient Neurology regarding staring spells  Standing order for ambulatory EEG from March 2023    Hypomagnesemia  Assessment & Plan  Started on magnesium oxide 400 mg BID    Hypokalemia  Assessment & Plan  Continue home regimen potassium chloride    BERNA (acute kidney injury) ([address])  Assessment & Plan  POA Creatinine 1.44  Baseline 0.9-1.2  Resolved    History of stroke  Assessment & Plan  Continue home regimen aspirin, Eliquis    T1DM (type 1 diabetes mellitus) ([address])  Assessment & Plan  Lab Results Component Value Date    HGBA1C 8.8 (H) 11/11/2023       Recent Labs     12/06/23  1514 12/06/23 2025 12/07/23  0717 12/07/23  1104   POCGLU 121 131 109 142*       Blood Sugar Average: Last 72 hrs:  (P) 777.3419849549380463    PTA Toujeo 20 units at bedtime, lispro 6 units at mealtimes  Brittle diabetes    Plan  Continue home regimen  Follow-up with Endocrinology    Paroxysmal atrial fibrillation [facility])  Assessment & Plan  Continue home regimen Eliquis   Not on rate-controlling medications due to chronic hypotension  Follow-up with Cardiology    Orthostatic hypotension  Assessment & Plan  Continue home regimen midodrine  Continue wearing compression stockings and abdominal binder  Follow-up with PCP and/or Cardiology    Adrenal insufficiency ([facility])  Assessment & Plan  PTA hydrocortisone 20 mg AM, 15 mg PM  PTA fludrocortisone 0.2 mg daily  Per Endocrinology, unlikely that patient's orthostatic episodes are related to adrenal insufficiency    Plan  Resume home regimen  Follow-up with outpatient Endocrinology        Medical Problems       Resolved Problems  Date Reviewed: 12/7/2023   None       Discharging Resident: Albertina Kahn MD  Discharging Attending: No att. providers found  PCP: Romayne Pho, MD  Admission Date:   Admission Orders (From admission, onward)       Ordered        12/05/23 1208  Inpatient Admission  Once            12/04/23 1538  Place in Observation  Once                          Discharge Date: 12/07/23    Consultations During Hospital Stay:  Neurology  Endocrinology    Procedures Performed:   EEG    Significant Findings / Test Results:   EEG - generalized irregular theta activity consistent with mild generalized non-specific encephalopathy. No electrographic seizures, interictal epileptiform discharges, or focal slowing. Echo - LVEF 60%. Normal systolic function. Normal diastolic function.   CT head wo contrast   Final Result by Marco Matute MD (57/30 0554)      No acute intracranial abnormality. Workstation performed: [id number]         CT spine cervical wo contrast   Final Result by Corrina Escalante MD (59/56 9619)      No cervical spine fracture or traumatic malalignment. Workstation performed: [id number]         CT head without contrast   Final Result by Julio Julio MD (12/04 5005)      No acute intracranial abnormality. Stable chronic microangiopathic changes within the brain. Workstation performed: [id number]         XR chest 1 view portable   ED Interpretation by Chiqui Gagnon DO (12/04 1327)   Chest x-ray shows no acute cardiopulmonary pathology as interpreted by myself pending final radiology read        Final Result by Jacquelyn Balderas MD (12/04 8963)      No acute cardiopulmonary disease. Workstation performed: [id number]           Incidental Findings:   None    Test Results Pending at Discharge (will require follow up): None     Outpatient Tests Requested:  Ambulatory EEG (standing order from March 8041)    Complications:  None    Reason for Admission: syncopal episode    Hospital Course:   Rolly Hamm is a 79 y.o. male patient with PMH of chronic orthostatic hypotension, adrenal insufficiency, and T1DM, who originally presented to the hospital on 12/4/2023 due to after a syncopal episode earlier that morning with diaphoresis, cyanotic lips, and urinary incontinence. Per patient's daughter, there have been several syncopal episodes in recent weeks. Additionally, patient has also been exhibiting episodes of unresponsiveness with blank staring for over a year, and these were noted on the day of admission as well. In the ED, labs were largely unremarkable. CXR and CT head were also unrevealing. Orthostatic blood pressures were positive. Patient was admitted to [facility] for observation on telemetry, and IV fluids were continued. Early the next morning, rapid response was called for an unwitnessed fall.  CT head and CT cervical spine showed no acute pathology/fracture. Neurology was consulted given blank staring spells and urinary incontinence associated with syncopal episode. It was determined that his presentation aligned more with syncope than seizure, but etiology of staring spells was unclear. Routine EEG was obtained, and found to be unremarkable. Antiepileptic medication was not indicated. Neurology recommended outpatient ambulatory EEG. A standing order is in place for this from March 2023 when patient saw outpatient Neurology (Dr. Sherry Rose). Compression stockings and abdominal binder were provided. Endocrinology was consulted for adrenal insufficiency, and hydrocortisone regimen was increased to 25 mg daily while inpatient. Orthostatic blood pressures continued to be positive despite these measures and adequate IV hydration/PO intake. PT/OT recommended STR, for which patient and his daughter are agreeable. Patient deemed medically stable for discharge to short-term rehab. Per Endocrinology, he will continue his previous hydrocortisone (20 AM, 15 PM) and fludrocortisone regimen on discharge. Magnesium oxide 400 mg BID was added given hypomagnesemia. Follow-up with PCP, outpatient Endocrinology, outpatient Neurology, outpatient Cardiology. Please see above list of diagnoses and related plan for additional information. Condition at Discharge: stable    Discharge Day Visit / Exam:   Subjective:  No acute overnight events. This morning, blood pressures were elevated to 183/102, so midodrine was held. Repeat blood pressure normal. Patient was seen and examined at bedside. He denies light-headedness, dizziness, chest pain, shortness of breath, nausea, abdominal pain, extremity weakness. No other complaints at this time.   Vitals: Blood Pressure: 109/60 (12/07/23 0854)  Pulse: 77 (12/07/23 0854)  Temperature: 98.1 °F (36.7 °C) (12/07/23 0717)  Temp Source: Oral (12/07/23 0717)  Respirations: 18 (12/07/23 9653)  Height: 5' 4" (162.6 cm) (12/05/23 1115)  Weight - Scale: 67.6 kg (149 lb) (12/05/23 1115)  SpO2: 98 % (12/07/23 0854)  Exam:   Physical Exam  Vitals and nursing note reviewed. Constitutional:       General: He is not in acute distress. Appearance: He is well-developed. HENT:      Head: Normocephalic and atraumatic. Mouth/Throat:      Mouth: Mucous membranes are moist.      Pharynx: Oropharynx is clear. Eyes:      Extraocular Movements: Extraocular movements intact. Conjunctiva/sclera: Conjunctivae normal.      Pupils: Pupils are equal, round, and reactive to light. Cardiovascular:      Rate and Rhythm: Normal rate and regular rhythm. Heart sounds: No murmur heard. Pulmonary:      Effort: Pulmonary effort is normal. No respiratory distress. Breath sounds: Normal breath sounds. Abdominal:      Palpations: Abdomen is soft. Tenderness: There is no abdominal tenderness. Musculoskeletal:         General: No swelling. Cervical back: Neck supple. Right lower leg: No edema. Left lower leg: No edema. Skin:     General: Skin is warm and dry. Capillary Refill: Capillary refill takes less than 2 seconds. Neurological:      General: No focal deficit present. Mental Status: He is alert and oriented to person, place, and time. Psychiatric:         Mood and Affect: Mood normal.        Discussion with Family: Updated  (daughter) via phone. Discharge instructions/Information to patient and family:   See after visit summary for information provided to patient and family. Provisions for Follow-Up Care:  See after visit summary for information related to follow-up care and any pertinent home health orders. Mobility at time of Discharge:   Basic Mobility Inpatient Raw Score: 13  JH-HLM Goal: 4: Move to chair/commode  JH-HLM Achieved: 4: Move to chair/commode  HLM Goal achieved. Continue to encourage appropriate mobility. Disposition:   Other [facility] Readmission: None    Discharge Medications:  See after visit summary for reconciled discharge medications provided to patient and/or family.       **Please Note: This note may have been constructed using a voice recognition system**

## 2023-12-07 NOTE — ASSESSMENT & PLAN NOTE
PTA hydrocortisone 20 mg AM, 15 mg PM  PTA fludrocortisone 0.2 mg daily  Per Endocrinology, unlikely that patient's orthostatic episodes are related to adrenal insufficiency    Plan  Resume home regimen  Follow-up with outpatient Endocrinology

## 2023-12-07 NOTE — ASSESSMENT & PLAN NOTE
Syncopal episode the morning of this admission. No head strike. Noted to be diaphoretic, bluish colored lips, and urinary incontinence. Reportedly regained consciousness within 1 minute, but blank stare and slurred speech were noted over the next hour. Additional episodes of blank stare later in the day prior to arrival in ED. Notably, hospitalized for similar symptoms after syncopal episode on 11/10/2023. PMH of orthostatic hypotension (on midodrine), T1DM, and adrenal insufficiency (on hydrocortisone and fludrocortisone)  Orthostatic vitals positive in the /62 lying, 96/51 sitting  CXR - no acute cardiopulmonary disease  CT head - no acute abnormalities, stable microangiopathic changes  ECG - NSR, rate 71, T wave inversions in inferior and lateral leads  Etiology: orthostatic vs cardiogenic syncope. Possible component of adrenal insufficiency or autonomic dysfunction secondary to T1DM.  Less suspicion for TIA or seizure   Echocardiogram - LVEF 60%  EEG unrevealing    Plan  Patient deemed medically stable for discharge to STR  Continue home regimen midodrine  Continue home regimen hydrocortisone and fludrocortisone  Follow-up with outpatient Neurology regarding staring spells  Standing order for ambulatory EEG from March 2023

## 2023-12-07 NOTE — ASSESSMENT & PLAN NOTE
Continue home regimen midodrine  Continue wearing compression stockings and abdominal binder  Follow-up with PCP and/or Cardiology

## 2023-12-07 NOTE — ASSESSMENT & PLAN NOTE
Lab Results   Component Value Date    HGBA1C 8.8 (H) 11/11/2023       Recent Labs     12/06/23  1514 12/06/23 2025 12/07/23  0717 12/07/23  1104   POCGLU 121 131 109 142*       Blood Sugar Average: Last 72 hrs:  (P) [id number]    PTA Toujeo 20 units at bedtime, lispro 6 units at mealtimes  Brittle diabetes    Plan  Continue home regimen  Follow-up with Endocrinology

## 2023-12-07 NOTE — CASE MANAGEMENT
[facility] has received approved authorization from insurance: [facility] in by Rep: Florinda KIRK# [id number]   Authorization received for: SNF  Facility:  [address] [place] [address] Road #: [id number]  Start of Care: 12/07/2023  Next Review Date: 12/11/2023   Submit next review to F#: [phone]    Care Manager notified: Ashlyn Marquis

## 2023-12-07 NOTE — CASE MANAGEMENT
Case Management Discharge Planning Note    Patient name Shaun Camp  Location S /S -01 MRN [id number]  : 1955 Date 2023       Current Admission Date: 2023  Current Admission Diagnosis:Syncope   Patient Active Problem List    Diagnosis Date Noted    Hypokalemia 2023    Hypomagnesemia 2023    Hypotension/syncope 11/10/2023    Fall 11/10/2023    Adrenal insufficiency ([address]) 11/10/2023    Orthostatic hypotension 11/10/2023    Type 1 diabetes mellitus with hypoglycemia ([address]) 11/10/2023    History of CVA (cerebrovascular accident) 11/10/2023    Paroxysmal atrial fibrillation ([address]) 11/10/2023    BERNA (acute kidney injury) ([address]) 2023    Spells of decreased attentiveness 2023    Cerebrovascular accident (CVA) ([address]) 2023    Iron deficiency anemia, unspecified 2023    Syncope 2023    Acute encephalopathy 2022    Unspecified protein-calorie malnutrition ([address]) 2022    Unintentional weight loss 2022    Type 2 MI (myocardial infarction) ([address]) 2022    Orthostatic hypotension 2022    Recurrent hypoglycemia 10/19/2022    T1DM (type 1 diabetes mellitus) ([address]) 10/19/2022    Adrenal insufficiency  10/19/2022    Paroxysmal atrial fibrillation  10/19/2022    History of stroke 10/19/2022    Anemia of chronic disease 10/19/2022    Psychiatric disorder 10/19/2022      LOS (days): 2  Geometric Mean LOS (GMLOS) (days): 2.40  Days to GMLOS:0.3     OBJECTIVE:  Risk of Unplanned Readmission Score: 27.41         Current admission status: Inpatient   Preferred Pharmacy:   [facility]  [address] [facility] [address]  Phone: [phone] Fax: [facility] [address] [facility] [address]  Phone: [phone] Fax: [phone]    [id number], [facility] [address] [facility] [address]  Phone: [phone] Fax: [phone]    Primary Care Provider: Belinda Hazel MD    Primary Insurance: [address] [place] [address]  Secondary Insurance:     DISCHARGE DETAILS:             CM contacted family/caregiver?: Yes             Contacts  Patient Contacts: Daughter- Ashleigh River  Relationship to Patient[de-identified] Family  Contact Method: Phone  Phone Number: [phone]  Reason/Outcome: Emergency Contact, Referral, Discharge Planning, Continuity of Care              Other Referral/Resources/Interventions Provided:  Interventions: Transportation  Referral Comments: CM d/c support notified this CM of auth received for STR placement. CM notified [facility] of same. CM f/u with [facility] for bed availability today - confirmed able to accept after 4pm today. Transport referral placed to [facility], confirmed for [facility]15 via [facility] to [facility] today. All parties notified of same. Treatment Team Recommendation: Short Term Rehab  Discharge Destination Plan[de-identified] Short Term Rehab  Transport at Discharge : [facility] by [place] and Unit #): [employer] of Transport (Date): 12/07/23  ETA of Transport (Time): 0137           IMM reviewed with patient's caregiver, patient's caregiver agrees with discharge determination.   IMM Given (Date):: 12/07/23  IMM Given to[de-identified] Family  Family notified[de-identified] Ashleigh River (dtr)       [address] Name, [address] [place] [address] Street : [facility]  Receiving Facility/Agency Phone Number: [phone]  Facility/Agency Fax Number: [phone]

## 2023-12-12 NOTE — UTILIZATION REVIEW
Natty Ortez RN  Utilization Review  Specialty: Utilization Review     Utilization Review      Addendum     Date of Service: 12/5/2023  7:43 AM     Expand All Collapse All    Initial Clinical Review     Observation 12/4 1538 changed to inpatient 12/5 1208. Pt requiring continued stay for EEG. Admission: Date/Time/Statement:   Admission Orders (From admission, onward)          Ordered         12/05/23 1208   Inpatient Admission  Once             12/04/23 1538   Place in Observation  Once                                     Orders Placed This Encounter   Procedures    Inpatient Admission       Standing Status:   Standing       Number of Occurrences:   1       Order Specific Question:   Level of Care       Answer:   Med Surg [16]       Order Specific Question:   Estimated length of stay       Answer:   More than 2 Midnights       Order Specific Question:   Certification       Answer:   I certify that inpatient services are medically necessary for this patient for a duration of greater than two midnights. See H&P and MD Progress Notes for additional information about the patient's course of treatment. ED Arrival Information         Expected   -    Arrival   12/4/2023 12:03    Acuity   Urgent                 Means of arrival   Wheelchair    Escorted by   Family Member    Service   Hospitalist    Admission type   Emergency                 Arrival complaint   Hypotension; Difficulty Walking                     Chief Complaint   Patient presents with    Weakness - Generalized       Pt presents to the ED from home with reports of hypotension and severe weakness. Reported near syncopal episode x2 while at doctors office. Initial Presentation: 79 y.o. male with a PMH of Afib on Eliquis, chronic orthostatic hypotension, adrenal insufficiency, T1DM who presents after a syncopal episode earlier this morning while his caregiver was getting him ready for a doctor's appointment.  Diaphoresis, blue-colored lips, and urinary incontinence were noted. . No tonic-clonic jerking movements were seen, but when the patient regained consciousness, he had a blank stare and was generally unresponsive to family's questioning. However, he has been having urinary incontinence for approximately 1 week. He was seen by PCP this AM, and was referred to Urology. He has not eaten anything since breakfast this morning, but his blood sugars are in the 200s-300s per Dexcom. Per patient's daughter, patient has been having syncopal episodes for the past several weeks, and was hospitalized 1 month ago for the same presentation. At the time, no changes to regimen were made except for sodium tablets, and patient was discharged home. The syncopal episode was attributed to his orthostatic hypotension with a component of adrenal insufficiency and autonomic dysfunction. Plan: Observation for syncope, BERNA with creatinine of 1.44 (baseline 0.9-1.2), hx of stroke, Type 1 DM, Afib, orthostatic hypotension, adrenal insufficiency: continue midodrine, hydrocortisone, fludrocortisone, IV fluids, orthostatic vitals, telemetry, Echo, Neurology consult, PT/OT eval, continue ASA and Eliquis, Toujeo 20 units at bedtime, lispro 6 units at mealtimes. 12/5 Observation changed to inpatient. Neurology consult: Routine EEG, fall precautions, telemetry, PT/OT eval, continue ASA and statin, Eliquis, positive orthostataic vitals- currently on Florinef and Midodrine, recommend thigh high NATALIO stockings. Date: 12/6    Day 3: Has surpassed a 2nd midnight with active treatments and services, which include EEG.                ED Triage Vitals   Temperature Pulse Respirations Blood Pressure SpO2   12/04/23 1212 12/04/23 1212 12/04/23 1212 12/04/23 1212 12/04/23 1212   98.3 °F (36.8 °C) 74 16 125/61 100 %       Temp Source Heart Rate Source Patient Position - Orthostatic VS BP Location FiO2 (%)   12/04/23 1212 12/04/23 1212 12/04/23 1212 12/04/23 1212 --   Oral Monitor Sitting Right arm         Pain Score           12/04/23 1941           No Pain                  Wt Readings from Last 1 Encounters:   12/05/23 67.6 kg (149 lb)      Additional Vital Signs:      Date/Time Temp Pulse Resp BP MAP (mmHg) SpO2 O2 Device Patient Position - Orthostatic VS   12/05/23 07:41:13 97.5 °F (36.4 °C) 76 -- 102/63 76 98 % -- --   12/05/23 04:38:29 -- -- -- 96/50 -- 100 % -- --   12/05/23 04:27:27 98.3 °F (36.8 °C) 80 18 67/40 Abnormal  -- 85 % Abnormal  None (Room air) --   12/04/23 2044 98.9 °F (37.2 °C) 75 18 107/74 85 97 % -- --   12/04/23 1830 -- 70 -- 139/69 98 100 % None (Room air) Lying   12/04/23 1550 -- 85 20 93/55 -- 99 % None (Room air) Standing - Orthostatic VS   12/04/23 1547 -- 72 17 96/51 -- -- -- Sitting - Orthostatic VS   12/04/23 1540 -- 72 17 132/62 -- -- -- Lying - Orthostatic VS      Pertinent Labs/Diagnostic Test Results:   CT head wo contrast   Final Result by Edith White MD (12/05 7418)       No acute intracranial abnormality. Workstation performed: [id number]           CT spine cervical wo contrast   Final Result by Edith White MD (36/81 1032)       No cervical spine fracture or traumatic malalignment. Workstation performed: [id number]           CT head without contrast   Final Result by Papo Noriega MD (12/04 1125)       No acute intracranial abnormality. Stable chronic microangiopathic changes within the brain. Workstation performed: [id number]           XR chest 1 view portable   ED Interpretation by Alison Streeter DO (12/04 1327)   Chest x-ray shows no acute cardiopulmonary pathology as interpreted by myself pending final radiology read          Final Result by Johana Leblanc MD (12/04 8203)       No acute cardiopulmonary disease.                        Workstation performed: [id number]              12/4 EKG:  Sinus rhythm with Premature atrial complexes  T wave abnormality, consider inferolateral ischemia  Prolonged QT  Abnormal ECG            Results from last 7 days   Lab Units 12/05/23  0503 12/04/23  1233   WBC Thousand/uL 8.55 9.63   HEMOGLOBIN g/dL 12.0 13.5   HEMATOCRIT % 37.5 41.3   PLATELETS Thousands/uL 142* 169   NEUTROS ABS Thousands/µL  --  5.52                Results from last 7 days   Lab Units 12/05/23  0503 12/04/23  1233   SODIUM mmol/L 140 137   POTASSIUM mmol/L 4.2 4.5   CHLORIDE mmol/L 109* 104   CO2 mmol/L 21 23   ANION GAP mmol/L 10 10   BUN mg/dL 26* 27*   CREATININE mg/dL 1.23 1.44*   EGFR ml/min/1.73sq m 60 50   CALCIUM mg/dL 8.5 9.0   MAGNESIUM mg/dL 1.3*  --            Results from last 7 days   Lab Units 12/04/23  1233   AST U/L 15   ALT U/L 12   ALK PHOS U/L 88   TOTAL PROTEIN g/dL 7.1   ALBUMIN g/dL 4.2   TOTAL BILIRUBIN mg/dL 0.60              Results from last 7 days   Lab Units 12/05/23  1151 12/05/23  0737 12/05/23  0428 12/04/23  2050   POC GLUCOSE mg/dl 208* 80 81 270*            Results from last 7 days   Lab Units 12/05/23  0503 12/04/23  1233   GLUCOSE RANDOM mg/dL 65 269*                 Results from last 7 days   Lab Units 12/04/23  1719 12/04/23  1419 12/04/23  1233   HS TNI 0HR ng/L  --   --  33   HS TNI 2HR ng/L  --  30  --    HSTNI D2 ng/L  --  -3  --    HS TNI 4HR ng/L 45  --   --    HSTNI D4 ng/L 12  --   --                  Results from last 7 days   Lab Units 12/04/23  1552   CLARITY UA   Clear   COLOR UA   Light Yellow   SPEC GRAV UA   1.013   PH UA   6.0   GLUCOSE UA mg/dl 200 (1/5%)*   KETONES UA mg/dl Negative   BLOOD UA   Negative   PROTEIN UA mg/dl Trace*   NITRITE UA   Negative   BILIRUBIN UA   Negative   UROBILINOGEN UA (BE) mg/dl <2.0   LEUKOCYTES UA   Negative   WBC UA /hpf 2-4*   RBC UA /hpf None Seen   BACTERIA UA /hpf None Seen   EPITHELIAL CELLS WET PREP /hpf Occasional          ED Treatment:   Medication Administration from 12/04/2023 1203 to 12/04/2023 1923           Date/Time Order Dose Route Action       12/04/2023 1419 EST sodium chloride 0.9 % bolus 1,000 mL 1,000 mL Intravenous New Bag             Medical History        Past Medical History:   Diagnosis Date    A-fib [facility])      Adrenal insufficiency (HCC)      Atrial fibrillation (HCC)      Diabetes mellitus ([address] [place] [address])      Obesity, morbid ([address] [place] [address]) 11/14/2022    Stroke ([address] [place] [address])           Present on Admission:   Orthostatic hypotension   Adrenal insufficiency (HCC)   Paroxysmal atrial fibrillation (HCC)   Syncope   BERNA (acute kidney injury) ([address] [place] [address])   T1DM (type 1 diabetes mellitus) ([address] [place] [address])        Admitting Diagnosis: Syncope [R55]  Weakness [R53.1]  Hypotension [I95.9]  Difficulty walking [R26.2]  BERNA (acute kidney injury) ([address] [place] [address]) [N17.9]  Age/Sex: 79 y.o. male  Admission Orders:  Scheduled Medications:  apixaban, 5 mg, Oral, BID  aspirin, 81 mg, Oral, Daily  atorvastatin, 10 mg, Oral, Daily  calcitriol, 0.25 mcg, Oral, Daily  cyanocobalamin, 100 mcg, Oral, Daily  ferrous sulfate, 325 mg, Oral, Daily With Breakfast  fludrocortisone, 0.2 mg, Oral, BID  hydrocortisone, 15 mg, Oral, Daily  hydrocortisone, 20 mg, Oral, QAM  insulin glargine, 18 Units, Subcutaneous, HS  insulin lispro, 1-5 Units, Subcutaneous, TID AC  insulin lispro, 1-5 Units, Subcutaneous, HS  insulin lispro, 6 Units, Subcutaneous, TID With Meals  melatonin, 3 mg, Oral, HS  midodrine, 15 mg, Oral, TID AC  risperiDONE, 1 mg, Oral, BID  sertraline, 100 mg, Oral, Daily  sodium chloride, 1 g, Oral, TID        Continuous IV Infusions:  sodium chloride, 100 mL/hr, Intravenous, Continuous        PRN Meds:  acetaminophen, 650 mg, Oral, Q6H PRN  ondansetron, 4 mg, Intravenous, Q6H PRN           IP CONSULT TO NEUROLOGY  IP CONSULT TO ENDOCRINOLOGY     Network Utilization Review Department  ATTENTION: Please call with any questions or concerns to [phone] and carefully listen to the prompts so that you are directed to the right person.  All voicemails are confidential.   For Discharge needs, contact Care Management DC Support Team at [phone] opt. 2  Send all requests for admission clinical reviews, approved or denied determinations and any other requests to dedicated fax number below belonging to the campus where the patient is receiving treatment.  List of dedicated fax numbers for the Facilities:  [place] DENIALS (Administrative/Medical Necessity) [phone]   DISCHARGE SUPPORT TEAM (NETWORK) [address] [facility] (Maternity/NICU/Pediatrics) [phone] [address] [facility] 1000 [facility] [phone]   [address] [facility] 207 [facility] [id number] [place] [address] [phone]   [facility] [phone]   [address] [place] [address] [id number] [place] [address] [phone]                  Revision History

## 2023-12-14 ENCOUNTER — APPOINTMENT (EMERGENCY)
Dept: RADIOLOGY | Facility: HOSPITAL | Age: 68
DRG: 871 | End: 2023-12-14
Payer: COMMERCIAL

## 2023-12-14 ENCOUNTER — HOSPITAL ENCOUNTER (INPATIENT)
Facility: HOSPITAL | Age: 68
LOS: 7 days | Discharge: NURSING, REHAB OR LONG-TERM CARE | DRG: 871 | End: 2023-12-21
Attending: EMERGENCY MEDICINE | Admitting: STUDENT IN AN ORGANIZED HEALTH CARE EDUCATION/TRAINING PROGRAM
Payer: COMMERCIAL

## 2023-12-14 DIAGNOSIS — I50.32 CHRONIC DIASTOLIC HEART FAILURE (HCC): ICD-10-CM

## 2023-12-14 DIAGNOSIS — E27.40 ADRENAL INSUFFICIENCY (HCC): ICD-10-CM

## 2023-12-14 DIAGNOSIS — I95.9 HYPOTENSION: Primary | ICD-10-CM

## 2023-12-14 DIAGNOSIS — G93.41 ACUTE METABOLIC ENCEPHALOPATHY: ICD-10-CM

## 2023-12-14 DIAGNOSIS — I95.1 ORTHOSTATIC HYPOTENSION: ICD-10-CM

## 2023-12-14 DIAGNOSIS — R50.9 FEBRILE: ICD-10-CM

## 2023-12-14 DIAGNOSIS — E87.20 LACTIC ACIDOSIS: ICD-10-CM

## 2023-12-14 DIAGNOSIS — N17.9 AKI (ACUTE KIDNEY INJURY) (HCC): ICD-10-CM

## 2023-12-14 DIAGNOSIS — R79.89 ELEVATED PROCALCITONIN: ICD-10-CM

## 2023-12-14 DIAGNOSIS — E10.649 TYPE 1 DIABETES MELLITUS WITH HYPOGLYCEMIA AND WITHOUT COMA (HCC): ICD-10-CM

## 2023-12-14 PROBLEM — R19.7 DIARRHEA: Status: ACTIVE | Noted: 2023-12-14

## 2023-12-14 PROBLEM — G93.40 ACUTE ENCEPHALOPATHY: Status: RESOLVED | Noted: 2022-12-07 | Resolved: 2023-12-14

## 2023-12-14 PROBLEM — R57.9 SHOCK (HCC): Status: ACTIVE | Noted: 2023-12-14

## 2023-12-14 PROBLEM — R57.1 HYPOVOLEMIC SHOCK (HCC): Status: ACTIVE | Noted: 2023-12-14

## 2023-12-14 LAB
2HR DELTA HS TROPONIN: -26 NG/L
4HR DELTA HS TROPONIN: -41 NG/L
ALBUMIN SERPL BCP-MCNC: 3.4 G/DL (ref 3.5–5)
ALBUMIN SERPL BCP-MCNC: 3.4 G/DL (ref 3.5–5)
ALP SERPL-CCNC: 60 U/L (ref 34–104)
ALP SERPL-CCNC: 60 U/L (ref 34–104)
ALT SERPL W P-5'-P-CCNC: 10 U/L (ref 7–52)
ALT SERPL W P-5'-P-CCNC: 10 U/L (ref 7–52)
ANION GAP SERPL CALCULATED.3IONS-SCNC: 7 MMOL/L
APTT PPP: 35 SECONDS (ref 23–37)
AST SERPL W P-5'-P-CCNC: 20 U/L (ref 13–39)
AST SERPL W P-5'-P-CCNC: 20 U/L (ref 13–39)
ATRIAL RATE: 73 BPM
ATRIAL RATE: 77 BPM
BACTERIA UR QL AUTO: ABNORMAL /HPF
BASOPHILS # BLD AUTO: 0.03 THOUSANDS/ÂΜL (ref 0–0.1)
BASOPHILS NFR BLD AUTO: 0 % (ref 0–1)
BILIRUB DIRECT SERPL-MCNC: 0.14 MG/DL (ref 0–0.2)
BILIRUB SERPL-MCNC: 0.6 MG/DL (ref 0.2–1)
BILIRUB SERPL-MCNC: 0.6 MG/DL (ref 0.2–1)
BILIRUB UR QL STRIP: NEGATIVE
BUN SERPL-MCNC: 19 MG/DL (ref 5–25)
CALCIUM ALBUM COR SERPL-MCNC: 8.5 MG/DL (ref 8.3–10.1)
CALCIUM SERPL-MCNC: 8 MG/DL (ref 8.4–10.2)
CARDIAC TROPONIN I PNL SERPL HS: 109 NG/L
CARDIAC TROPONIN I PNL SERPL HS: 124 NG/L
CARDIAC TROPONIN I PNL SERPL HS: 150 NG/L
CHLORIDE SERPL-SCNC: 103 MMOL/L (ref 96–108)
CLARITY UR: CLEAR
CO2 SERPL-SCNC: 24 MMOL/L (ref 21–32)
COLOR UR: ABNORMAL
CREAT SERPL-MCNC: 1.31 MG/DL (ref 0.6–1.3)
EOSINOPHIL # BLD AUTO: 0.05 THOUSAND/ÂΜL (ref 0–0.61)
EOSINOPHIL NFR BLD AUTO: 1 % (ref 0–6)
ERYTHROCYTE [DISTWIDTH] IN BLOOD BY AUTOMATED COUNT: 14.8 % (ref 11.6–15.1)
GFR SERPL CREATININE-BSD FRML MDRD: 56 ML/MIN/1.73SQ M
GLUCOSE SERPL-MCNC: 242 MG/DL (ref 65–140)
GLUCOSE SERPL-MCNC: 256 MG/DL (ref 65–140)
GLUCOSE SERPL-MCNC: 346 MG/DL (ref 65–140)
GLUCOSE SERPL-MCNC: 366 MG/DL (ref 65–140)
GLUCOSE UR STRIP-MCNC: ABNORMAL MG/DL
HCT VFR BLD AUTO: 31.8 % (ref 36.5–49.3)
HGB BLD-MCNC: 10.5 G/DL (ref 12–17)
HGB UR QL STRIP.AUTO: NEGATIVE
HYALINE CASTS #/AREA URNS LPF: ABNORMAL /LPF
IMM GRANULOCYTES # BLD AUTO: 0.06 THOUSAND/UL (ref 0–0.2)
IMM GRANULOCYTES NFR BLD AUTO: 1 % (ref 0–2)
INR PPP: 1.36 (ref 0.84–1.19)
KETONES UR STRIP-MCNC: NEGATIVE MG/DL
LACTATE SERPL-SCNC: 1.4 MMOL/L (ref 0.5–2)
LACTATE SERPL-SCNC: 2.2 MMOL/L (ref 0.5–2)
LEUKOCYTE ESTERASE UR QL STRIP: ABNORMAL
LYMPHOCYTES # BLD AUTO: 1.49 THOUSANDS/ÂΜL (ref 0.6–4.47)
LYMPHOCYTES NFR BLD AUTO: 15 % (ref 14–44)
MCH RBC QN AUTO: 33 PG (ref 26.8–34.3)
MCHC RBC AUTO-ENTMCNC: 33 G/DL (ref 31.4–37.4)
MCV RBC AUTO: 100 FL (ref 82–98)
MONOCYTES # BLD AUTO: 1.13 THOUSAND/ÂΜL (ref 0.17–1.22)
MONOCYTES NFR BLD AUTO: 11 % (ref 4–12)
NEUTROPHILS # BLD AUTO: 7.22 THOUSANDS/ÂΜL (ref 1.85–7.62)
NEUTS SEG NFR BLD AUTO: 72 % (ref 43–75)
NITRITE UR QL STRIP: NEGATIVE
NON-SQ EPI CELLS URNS QL MICRO: ABNORMAL /HPF
NRBC BLD AUTO-RTO: 0 /100 WBCS
P AXIS: 25 DEGREES
P AXIS: 32 DEGREES
PH UR STRIP.AUTO: 5 [PH]
PLATELET # BLD AUTO: 115 THOUSANDS/UL (ref 149–390)
PMV BLD AUTO: 11 FL (ref 8.9–12.7)
POTASSIUM SERPL-SCNC: 3.4 MMOL/L (ref 3.5–5.3)
PR INTERVAL: 163 MS
PR INTERVAL: 166 MS
PROCALCITONIN SERPL-MCNC: 3.05 NG/ML
PROT SERPL-MCNC: 5.7 G/DL (ref 6.4–8.4)
PROT SERPL-MCNC: 5.7 G/DL (ref 6.4–8.4)
PROT UR STRIP-MCNC: NEGATIVE MG/DL
PROTHROMBIN TIME: 16.6 SECONDS (ref 11.6–14.5)
QRS AXIS: 42 DEGREES
QRS AXIS: 49 DEGREES
QRSD INTERVAL: 66 MS
QRSD INTERVAL: 67 MS
QT INTERVAL: 379 MS
QT INTERVAL: 386 MS
QTC INTERVAL: 418 MS
QTC INTERVAL: 436 MS
RBC # BLD AUTO: 3.18 MILLION/UL (ref 3.88–5.62)
RBC #/AREA URNS AUTO: ABNORMAL /HPF
RENAL EPI CELLS #/AREA URNS HPF: PRESENT /[HPF]
SODIUM SERPL-SCNC: 134 MMOL/L (ref 135–147)
SP GR UR STRIP.AUTO: 1.01 (ref 1–1.03)
T WAVE AXIS: -64 DEGREES
T WAVE AXIS: -79 DEGREES
UROBILINOGEN UR STRIP-ACNC: <2 MG/DL
VENTRICULAR RATE: 73 BPM
VENTRICULAR RATE: 77 BPM
WBC # BLD AUTO: 9.98 THOUSAND/UL (ref 4.31–10.16)
WBC #/AREA URNS AUTO: ABNORMAL /HPF

## 2023-12-14 PROCEDURE — 99285 EMERGENCY DEPT VISIT HI MDM: CPT

## 2023-12-14 PROCEDURE — 96368 THER/DIAG CONCURRENT INF: CPT

## 2023-12-14 PROCEDURE — 99291 CRITICAL CARE FIRST HOUR: CPT | Performed by: EMERGENCY MEDICINE

## 2023-12-14 PROCEDURE — 02HV33Z INSERTION OF INFUSION DEVICE INTO SUPERIOR VENA CAVA, PERCUTANEOUS APPROACH: ICD-10-PCS | Performed by: EMERGENCY MEDICINE

## 2023-12-14 PROCEDURE — 96375 TX/PRO/DX INJ NEW DRUG ADDON: CPT

## 2023-12-14 PROCEDURE — 96367 TX/PROPH/DG ADDL SEQ IV INF: CPT

## 2023-12-14 PROCEDURE — 96366 THER/PROPH/DIAG IV INF ADDON: CPT

## 2023-12-14 PROCEDURE — 85730 THROMBOPLASTIN TIME PARTIAL: CPT | Performed by: EMERGENCY MEDICINE

## 2023-12-14 PROCEDURE — 80076 HEPATIC FUNCTION PANEL: CPT | Performed by: STUDENT IN AN ORGANIZED HEALTH CARE EDUCATION/TRAINING PROGRAM

## 2023-12-14 PROCEDURE — 87493 C DIFF AMPLIFIED PROBE: CPT | Performed by: EMERGENCY MEDICINE

## 2023-12-14 PROCEDURE — 84484 ASSAY OF TROPONIN QUANT: CPT | Performed by: EMERGENCY MEDICINE

## 2023-12-14 PROCEDURE — 82948 REAGENT STRIP/BLOOD GLUCOSE: CPT

## 2023-12-14 PROCEDURE — 93005 ELECTROCARDIOGRAM TRACING: CPT

## 2023-12-14 PROCEDURE — 71045 X-RAY EXAM CHEST 1 VIEW: CPT

## 2023-12-14 PROCEDURE — 85610 PROTHROMBIN TIME: CPT | Performed by: EMERGENCY MEDICINE

## 2023-12-14 PROCEDURE — 87449 NOS EACH ORGANISM AG IA: CPT | Performed by: STUDENT IN AN ORGANIZED HEALTH CARE EDUCATION/TRAINING PROGRAM

## 2023-12-14 PROCEDURE — 99223 1ST HOSP IP/OBS HIGH 75: CPT | Performed by: STUDENT IN AN ORGANIZED HEALTH CARE EDUCATION/TRAINING PROGRAM

## 2023-12-14 PROCEDURE — 81001 URINALYSIS AUTO W/SCOPE: CPT | Performed by: EMERGENCY MEDICINE

## 2023-12-14 PROCEDURE — 85025 COMPLETE CBC W/AUTO DIFF WBC: CPT | Performed by: EMERGENCY MEDICINE

## 2023-12-14 PROCEDURE — 36415 COLL VENOUS BLD VENIPUNCTURE: CPT | Performed by: EMERGENCY MEDICINE

## 2023-12-14 PROCEDURE — 87040 BLOOD CULTURE FOR BACTERIA: CPT | Performed by: EMERGENCY MEDICINE

## 2023-12-14 PROCEDURE — 80053 COMPREHEN METABOLIC PANEL: CPT | Performed by: EMERGENCY MEDICINE

## 2023-12-14 PROCEDURE — 76937 US GUIDE VASCULAR ACCESS: CPT | Performed by: EMERGENCY MEDICINE

## 2023-12-14 PROCEDURE — 83605 ASSAY OF LACTIC ACID: CPT | Performed by: EMERGENCY MEDICINE

## 2023-12-14 PROCEDURE — 87081 CULTURE SCREEN ONLY: CPT | Performed by: STUDENT IN AN ORGANIZED HEALTH CARE EDUCATION/TRAINING PROGRAM

## 2023-12-14 PROCEDURE — 36556 INSERT NON-TUNNEL CV CATH: CPT | Performed by: EMERGENCY MEDICINE

## 2023-12-14 PROCEDURE — 84145 PROCALCITONIN (PCT): CPT | Performed by: EMERGENCY MEDICINE

## 2023-12-14 PROCEDURE — 96365 THER/PROPH/DIAG IV INF INIT: CPT

## 2023-12-14 RX ORDER — ATORVASTATIN CALCIUM 10 MG/1
10 TABLET, FILM COATED ORAL DAILY
Status: DISCONTINUED | OUTPATIENT
Start: 2023-12-14 | End: 2023-12-14

## 2023-12-14 RX ORDER — MIDODRINE HYDROCHLORIDE 5 MG/1
15 TABLET ORAL
Status: DISCONTINUED | OUTPATIENT
Start: 2023-12-14 | End: 2023-12-21 | Stop reason: HOSPADM

## 2023-12-14 RX ORDER — SODIUM CHLORIDE, SODIUM GLUCONATE, SODIUM ACETATE, POTASSIUM CHLORIDE, MAGNESIUM CHLORIDE, SODIUM PHOSPHATE, DIBASIC, AND POTASSIUM PHOSPHATE .53; .5; .37; .037; .03; .012; .00082 G/100ML; G/100ML; G/100ML; G/100ML; G/100ML; G/100ML; G/100ML
50 INJECTION, SOLUTION INTRAVENOUS CONTINUOUS
Status: DISCONTINUED | OUTPATIENT
Start: 2023-12-14 | End: 2023-12-16

## 2023-12-14 RX ORDER — RISPERIDONE 1 MG/1
1 TABLET ORAL 2 TIMES DAILY
Status: DISCONTINUED | OUTPATIENT
Start: 2023-12-14 | End: 2023-12-21 | Stop reason: HOSPADM

## 2023-12-14 RX ORDER — MIDODRINE HYDROCHLORIDE 5 MG/1
10 TABLET ORAL ONCE
Status: COMPLETED | OUTPATIENT
Start: 2023-12-14 | End: 2023-12-14

## 2023-12-14 RX ORDER — FERROUS SULFATE 325(65) MG
325 TABLET ORAL
Status: DISCONTINUED | OUTPATIENT
Start: 2023-12-15 | End: 2023-12-21 | Stop reason: HOSPADM

## 2023-12-14 RX ORDER — HYDROCORTISONE 20 MG/1
20 TABLET ORAL EVERY MORNING
Status: DISCONTINUED | OUTPATIENT
Start: 2023-12-15 | End: 2023-12-19

## 2023-12-14 RX ORDER — ATORVASTATIN CALCIUM 10 MG/1
10 TABLET, FILM COATED ORAL
Status: DISCONTINUED | OUTPATIENT
Start: 2023-12-15 | End: 2023-12-21 | Stop reason: HOSPADM

## 2023-12-14 RX ORDER — SERTRALINE HYDROCHLORIDE 100 MG/1
100 TABLET, FILM COATED ORAL DAILY
Status: DISCONTINUED | OUTPATIENT
Start: 2023-12-14 | End: 2023-12-21 | Stop reason: HOSPADM

## 2023-12-14 RX ORDER — VANCOMYCIN HYDROCHLORIDE 1 G/200ML
15 INJECTION, SOLUTION INTRAVENOUS ONCE
Status: DISCONTINUED | OUTPATIENT
Start: 2023-12-14 | End: 2023-12-14

## 2023-12-14 RX ORDER — ACETAMINOPHEN 325 MG/1
975 TABLET ORAL EVERY 6 HOURS PRN
Status: DISCONTINUED | OUTPATIENT
Start: 2023-12-14 | End: 2023-12-21 | Stop reason: HOSPADM

## 2023-12-14 RX ORDER — FLUDROCORTISONE ACETATE 0.1 MG/1
0.2 TABLET ORAL DAILY
Status: DISCONTINUED | OUTPATIENT
Start: 2023-12-14 | End: 2023-12-21 | Stop reason: HOSPADM

## 2023-12-14 RX ORDER — CALCITRIOL 0.25 UG/1
0.25 CAPSULE, LIQUID FILLED ORAL DAILY
Status: DISCONTINUED | OUTPATIENT
Start: 2023-12-14 | End: 2023-12-21 | Stop reason: HOSPADM

## 2023-12-14 RX ORDER — SODIUM CHLORIDE 1 G/1
1 TABLET ORAL 3 TIMES DAILY
Status: DISCONTINUED | OUTPATIENT
Start: 2023-12-14 | End: 2023-12-21 | Stop reason: HOSPADM

## 2023-12-14 RX ORDER — INSULIN LISPRO 100 [IU]/ML
1-5 INJECTION, SOLUTION INTRAVENOUS; SUBCUTANEOUS
Status: DISCONTINUED | OUTPATIENT
Start: 2023-12-14 | End: 2023-12-21 | Stop reason: HOSPADM

## 2023-12-14 RX ORDER — HYDROCORTISONE 5 MG/1
5 TABLET ORAL SEE ADMIN INSTRUCTIONS
Status: DISCONTINUED | OUTPATIENT
Start: 2023-12-14 | End: 2023-12-14

## 2023-12-14 RX ORDER — INSULIN GLARGINE 100 [IU]/ML
10 INJECTION, SOLUTION SUBCUTANEOUS
Status: DISCONTINUED | OUTPATIENT
Start: 2023-12-14 | End: 2023-12-15

## 2023-12-14 RX ORDER — POTASSIUM CHLORIDE 750 MG/1
20 TABLET, EXTENDED RELEASE ORAL 2 TIMES DAILY
Status: DISCONTINUED | OUTPATIENT
Start: 2023-12-14 | End: 2023-12-21 | Stop reason: HOSPADM

## 2023-12-14 RX ORDER — NOREPINEPHRINE BITARTRATE 1 MG/ML
INJECTION, SOLUTION INTRAVENOUS
Status: COMPLETED
Start: 2023-12-14 | End: 2023-12-14

## 2023-12-14 RX ORDER — ONDANSETRON 2 MG/ML
4 INJECTION INTRAMUSCULAR; INTRAVENOUS EVERY 6 HOURS PRN
Status: DISCONTINUED | OUTPATIENT
Start: 2023-12-14 | End: 2023-12-19

## 2023-12-14 RX ORDER — ASPIRIN 81 MG/1
81 TABLET, CHEWABLE ORAL DAILY
Status: DISCONTINUED | OUTPATIENT
Start: 2023-12-14 | End: 2023-12-21 | Stop reason: HOSPADM

## 2023-12-14 RX ADMIN — VITAM B12 100 MCG: 100 TAB at 13:19

## 2023-12-14 RX ADMIN — INSULIN LISPRO 2 UNITS: 100 INJECTION, SOLUTION INTRAVENOUS; SUBCUTANEOUS at 18:36

## 2023-12-14 RX ADMIN — NOREPINEPHRINE BITARTRATE 5 MCG/MIN: 1 INJECTION, SOLUTION, CONCENTRATE INTRAVENOUS at 09:10

## 2023-12-14 RX ADMIN — FLUDROCORTISONE ACETATE 0.2 MG: 0.1 TABLET ORAL at 13:19

## 2023-12-14 RX ADMIN — ASPIRIN 81 MG CHEWABLE TABLET 81 MG: 81 TABLET CHEWABLE at 13:19

## 2023-12-14 RX ADMIN — SODIUM CHLORIDE 1 G: 1 TABLET ORAL at 22:15

## 2023-12-14 RX ADMIN — SERTRALINE HYDROCHLORIDE 100 MG: 100 TABLET ORAL at 13:20

## 2023-12-14 RX ADMIN — HYDROCORTISONE 15 MG: 10 TABLET ORAL at 18:34

## 2023-12-14 RX ADMIN — AZITHROMYCIN MONOHYDRATE 500 MG: 500 INJECTION, POWDER, LYOPHILIZED, FOR SOLUTION INTRAVENOUS at 16:12

## 2023-12-14 RX ADMIN — MIDODRINE HYDROCHLORIDE 15 MG: 5 TABLET ORAL at 13:20

## 2023-12-14 RX ADMIN — CALCITRIOL 0.25 MCG: 0.25 CAPSULE, LIQUID FILLED ORAL at 13:46

## 2023-12-14 RX ADMIN — INSULIN GLARGINE 10 UNITS: 100 INJECTION, SOLUTION SUBCUTANEOUS at 22:16

## 2023-12-14 RX ADMIN — RISPERIDONE 1 MG: 1 TABLET ORAL at 13:46

## 2023-12-14 RX ADMIN — NOREPINEPHRINE BITARTRATE 4 MG: 1 SOLUTION INTRAVENOUS at 09:10

## 2023-12-14 RX ADMIN — CEFTRIAXONE SODIUM 2000 MG: 10 INJECTION, POWDER, FOR SOLUTION INTRAVENOUS at 09:00

## 2023-12-14 RX ADMIN — APIXABAN 5 MG: 5 TABLET, FILM COATED ORAL at 13:20

## 2023-12-14 RX ADMIN — SODIUM CHLORIDE 1 G: 1 TABLET ORAL at 16:12

## 2023-12-14 RX ADMIN — POTASSIUM CHLORIDE 20 MEQ: 750 TABLET, EXTENDED RELEASE ORAL at 18:33

## 2023-12-14 RX ADMIN — MIDODRINE HYDROCHLORIDE 10 MG: 5 TABLET ORAL at 10:13

## 2023-12-14 RX ADMIN — SODIUM CHLORIDE, SODIUM GLUCONATE, SODIUM ACETATE, POTASSIUM CHLORIDE, MAGNESIUM CHLORIDE, SODIUM PHOSPHATE, DIBASIC, AND POTASSIUM PHOSPHATE 100 ML/HR: .53; .5; .37; .037; .03; .012; .00082 INJECTION, SOLUTION INTRAVENOUS at 22:56

## 2023-12-14 RX ADMIN — MIDODRINE HYDROCHLORIDE 15 MG: 5 TABLET ORAL at 16:11

## 2023-12-14 RX ADMIN — APIXABAN 5 MG: 5 TABLET, FILM COATED ORAL at 18:33

## 2023-12-14 RX ADMIN — HYDROCORTISONE SODIUM SUCCINATE 100 MG: 100 INJECTION, POWDER, FOR SOLUTION INTRAMUSCULAR; INTRAVENOUS at 10:11

## 2023-12-14 RX ADMIN — SODIUM CHLORIDE 1000 ML: 0.9 INJECTION, SOLUTION INTRAVENOUS at 08:31

## 2023-12-14 RX ADMIN — SODIUM CHLORIDE, SODIUM GLUCONATE, SODIUM ACETATE, POTASSIUM CHLORIDE, MAGNESIUM CHLORIDE, SODIUM PHOSPHATE, DIBASIC, AND POTASSIUM PHOSPHATE 100 ML/HR: .53; .5; .37; .037; .03; .012; .00082 INJECTION, SOLUTION INTRAVENOUS at 13:20

## 2023-12-14 RX ADMIN — VANCOMYCIN HYDROCHLORIDE 1750 MG: 5 INJECTION, POWDER, LYOPHILIZED, FOR SOLUTION INTRAVENOUS at 10:10

## 2023-12-14 RX ADMIN — POTASSIUM CHLORIDE 20 MEQ: 750 TABLET, EXTENDED RELEASE ORAL at 13:20

## 2023-12-14 RX ADMIN — INSULIN LISPRO 3 UNITS: 100 INJECTION, SOLUTION INTRAVENOUS; SUBCUTANEOUS at 13:55

## 2023-12-14 RX ADMIN — RISPERIDONE 1 MG: 1 TABLET ORAL at 18:34

## 2023-12-14 NOTE — ED PROVIDER NOTES
Final Diagnoses:     1. Hypotension    2. BERNA (acute kidney injury) (HCC)    3. Lactic acidosis    4. Elevated procalcitonin    5. Febrile      ED Course as of 12/15/23 0848   Thu Dec 14, 2023   0900 Patient has worsening pressures despite fluid bolus.  Pressures dropping 130 --> 87 --> 86 --> 82 --> 73.   Not fluid responsive.   Will begin pressors.    0900 Likely will need CVC.   0901 POCUS Cardiac/IVC + POCUS Lung:  - transthoracic echocardiogram was performed at bedside by myself and resident.   - Images collected were poor quality.   - This was a technically difficult study due to lung interference.   - Apical, parasternal, subcostal views were obtained/attempted.   - The main purpose of this study was to r/o obvious pathology requiring emergent intervention as in summary.   - Many views/images obtained for educational reasons.   - Findings:    There was no obvious pericardial effusion:   LV function grossly normal appearing.     EPSS <7 mm     MAPSE 9mm >8; normal sensitivity 73-92% specificity % for EF > 50%     E/A Ratio 0.97     E' 13.17    MV Dec T: 213    E/E' Ratio 13     Grade 2 Diastolic dysfunction Mitral Inflow: E/A > 0.8 Tissue Doppler: e' < 8cm/s E/e': 8-15    LVOT VTI 20-24 (normal) @ HR 74    EF low-normal.   RV function grossly normal appearing.     IVC was IVC < 2.1cm; <50% compression w/ sniff likely RAP 8 mmHg     IVC Max: 17     IVC Min:13     CI: 19%    TAPSE 18 (>17 normal; <17.5mm 87% sensitive and 91% specific for RV dysfunction)    S' = 13 (normal 10+)   Lungs:    There was no obvious pleural effusion.     B-lines were not present anteriorly bilaterally at upper BLUE-point (3+ B-lines in 2+ fields w/ concern for HF/Cardiogenic pulmonary edema sensitivity 85-94%, specificity 92% LR+ 7.4-12; LR- 0.06-0.16)    Bilateral anterior lung sliding bilaterally present at upper BLUE-point (no pneumothorax, sen 91%; spec 98%)   Valves:    There was no obvious MR regurgitation.    There was no  obvious TR regurgitation.    There was no obvious AR regurgitation    There was LA dilation.    There was RA dilation.     Summary:   - There were no obvious B-lines  - There was no obvious anterior pneumothorax  - There was no large pericardial effusion.   - Grossly normal LV function.      0906 Procalcitonin(!): 3.05   0907 Creatinine(!): 1.31  BERNA   0907 Glucose, Random(!): 242   0907 Patient smells of UTI / malodorous. Getting straight cath since that's what I'm most suspicious for.    0914 Discussed with daughter:  - full code  - hx of simialr at [facility]; reviewing records got pressors then.   0926 Consent for central line placement with the daughter.  Will place right IJ.  No question at this time.  The patient is very pressor responsive with blood pressure increasing from 77-96 with 5 mcg/min of Levophed.  Will continue fluids plus pressors.  Antibiotics being infused but I need more access which was another reason for the central venous catheter.  - POCUS guided.   - Verbal consent from the daughter on the phone; patient already okay getting it but I think he is somewhat encephalopathic which is why I called the daughter.    0927 LACTIC ACID(!!): 2.2   0945 hs TnI 0hr(!): 150   0945 hs TnI 0hr(!): 150  Above baseline.    0959 Successful IVC R IJ.   1016 Procedure Note for Ultrasound Guided Peripheral Line:  Skin prepared using Chloraprep.   In the LEFT brachial vein, using ultrasound guidance (CPT code 09750), an 18G peripheral IV long angiocatheter was placed successfully by physician secondary to difficulty placing IV by nursing staff.   Successful.   One attempt.   Good blood return.   Good palpable flush.   Adhered to skin using Tegederm.    1036 The patient is significantly improved.   His speech is no longer slow, he is awake, alert, answering normally.  Dramatically different than just a bit ago.  NORAD requirements reducing 5...4...3...2 mcg/minute.     Will discuss with MICU for admission vs. SD1   1128  LACTIC ACID: 1.4   1128 ICU aware.    1200 Discussed with ICU attending + fellow.  Patient's BP okay.   Patient is off of NORAD.   Currently 92 systolic; MAP 66 off of pressors.      Nursing Triage:     Chief Complaint   Patient presents with    Altered Mental Status     Pt brought in via EMS from [facility] after staff reports AMS and fevers starting this AM.     HPI:   This is a 68 y.o. male presenting for evaluation of Alterman status, fever.  The patient is a resident of [facility].  He is a very poor historian answering yes or no unreliability to most questions.  He denies any complaints initially I wanted to be aware of the questions he will agree to everything.  He was seen immediately upon arrival given hypotension.  Per EMS the patient was altered, febrile, given Tylenol on route.  His initial blood pressure was 130 systolic for EMS but was 86 systolic here and steadily declining during my evaluation.  Reviewing chart the patient was here and discharged on December 7, concern for syncope, orthostatic hypotension, adrenal insufficiency, recent TIA    PMH: Orthostatic hypotension, TIA.     ASSESSMENT + PLAN:   Hypotension, fever, AMS.   - Given reported complaints, will do a broad work-up.  - CTH low yield; no signs of trauma. High suspicion for alternative cause.   - CMP for electrolyte deviation, BERNA, markers of severe dehydration.   - CBC for leukocytosis or severe anemia or marked thrombocytopenia.   - Cardiac evaluation as well given the age with a Troponin and EKG for acute dysrrhythmia as alternative reason for AMS  - Urine dip + CXR for evaluation of possible sources of infection.   - Procalcitonin, Lactic acid, Blood cultures as part of sepsis workup for AMS.   - Suspect admission given the acute toxic metabolic encephalopathy without obvious source.   - Empiric abx given worsening hypotension  - Empiric steroids given hx of adrenal insufficiency. Maybe that is playing a role.   - Prepare  pressors ? discussed code status ? full code including pressors and consent for CVC PRN.   - Doubt cardiogenic shock given warm perfused extremities BUT POCUS pending.     Physical:   General: VSS, NAD, awake, alert. Well-nourished, well-developed. Appears stated age.   Speaking normally in full sentences.   Head: Normocephalic, atraumatic, nontender.  Eyes: PERRL, EOM-I. No diplopia.   No hyphema.   No subconjunctival hemorrhages.  Symmetrical lids.   ENT: Atraumatic external nose and ears.    Mildly dry MM  No malocclusion. No stridor. Slow phonation. No drooling. Normal swallowing.   Neck: Symmetric, trachea midline. No JVD.  CV: RRR. +S1/S2  No murmurs or gallops  Peripheral pulses +2 throughout. No chest wall tenderness.   Lungs:   Unlabored No retractions  CTAB, lungs sounds equal bilateral.   No tachypnea.   Abd: +BS, soft, NT/ND.   MSK:   FROM   Back:   No rashes  Skin: Dry, intact.   Not diaphoretic  Warm, perfused extremities.   Neuro: AAOx3, GCS 15, CN II-XII grossly intact.   Motor grossly intact.  Psychiatric/Behavioral: Appropriate mood and affect   Exam: deferred    Vitals:    12/15/23 0500 12/15/23 0505 12/15/23 0519 12/15/23 0735   BP:  (!) 172/94 169/94 167/90   Pulse:  80 84 74   Resp:       Temp:  97.8 °F (36.6 °C)  (!) 97.4 °F (36.3 °C)   TempSrc:       SpO2: 99% 97% 98% 99%   Weight:         - There are no obvious limitations to social determinants of care.   - Nursing note reviewed.   - Vitals reviewed.   - Orders placed by myself and/or advanced practitioner / resident.    - Previous chart was reviewed  - No language barrier.   - History obtained from patient.    - There are limitations to the history obtained: AMS    Critical Care Time:   - Critical care time: 34 minutes.   - Critical care time was exclusive of seperately bilable procedures and treating other patients as well as teaching time.   - Critical care was necessary to treat or prevent imminent or life-threatening deterioration of  the following condition: Undifferentiated shock  - Critical care time was spent personally by me on the following activities as well as the above as per the ED course and rest of chart: blood draw for specimens, obtaining history from patient / surrogate, developement of a treatment plan, discussions with consultants, evaluation of patient's response to the treatment, examination of the patient, ordering/performing treatements and interventions, re-evaluation of the patient's condition, review of old charts, ordering/reviewing laboratory studies, and ordering/reviewing of radiographic studies    Past Medical:    has a past medical history of A-fib (HCC), Adrenal insufficiency (HCC), Atrial fibrillation (HCC), Diabetes mellitus (HCC), Obesity, morbid (HCC) (11/14/2022), and Stroke (HCC).    Past Surgical:    has no past surgical history on file.    Social:     Social History     Substance and Sexual Activity   Alcohol Use Not Currently    Alcohol/week: 5.0 standard drinks of alcohol    Types: 5 Cans of beer per week    Comment: Quit in august 2022     Social History     Tobacco Use   Smoking Status Former    Current packs/day: 0.25    Average packs/day: 0.3 packs/day for 30.0 years (7.5 ttl pk-yrs)    Types: Cigarettes   Smokeless Tobacco Never     Social History     Substance and Sexual Activity   Drug Use Never       Echo:   No results found for this or any previous visit.    No results found for this or any previous visit.      Cath:    No results found for this or any previous visit.      Code Status: Level 1 - Full Code  Advance Directive and Living Will:      Power of :    POLST:    Medications   multi-electrolyte (PLASMALYTE-A/ISOLYTE-S PH 7.4) IV solution (100 mL/hr Intravenous New Bag 12/14/23 1949)   apixaban (ELIQUIS) tablet 5 mg (5 mg Oral Given 12/15/23 0846)   aspirin chewable tablet 81 mg (81 mg Oral Given 12/15/23 0845)   calcitriol (ROCALTROL) capsule 0.25 mcg (0.25 mcg Oral Given 12/15/23  0845)   cyanocobalamin (VITAMIN B-12) tablet 100 mcg (100 mcg Oral Given 12/15/23 0846)   ferrous sulfate tablet 325 mg (325 mg Oral Given 12/15/23 0846)   fludrocortisone (FLORINEF) tablet 0.2 mg (0.2 mg Oral Given 12/15/23 0847)   insulin glargine (LANTUS) subcutaneous injection 10 Units 0.1 mL ( Subcutaneous MAR Unhold 12/15/23 0515)   midodrine (PROAMATINE) tablet 15 mg (15 mg Oral Given 12/15/23 0846)   risperiDONE (RisperDAL) tablet 1 mg (1 mg Oral Given 12/15/23 0846)   sertraline (ZOLOFT) tablet 100 mg (100 mg Oral Given 12/15/23 0846)   sodium chloride tablet 1 g (1 g Oral Given 12/15/23 0846)   potassium chloride (K-DUR,KLOR-CON) CR tablet 20 mEq (20 mEq Oral Given 12/15/23 0846)   hydrocortisone (CORTEF) tablet 20 mg ( Oral MAR Unhold 12/15/23 0515)   hydrocortisone (CORTEF) tablet 15 mg ( Oral MAR Unhold 12/15/23 0515)   atorvastatin (LIPITOR) tablet 10 mg ( Oral MAR Unhold 12/15/23 0515)   insulin lispro (HumaLOG) 100 units/mL subcutaneous injection 1-5 Units (2 Units Subcutaneous Given 12/15/23 0848)   acetaminophen (TYLENOL) tablet 975 mg ( Oral MAR Unhold 12/15/23 0515)   ondansetron (ZOFRAN) injection 4 mg ( Intravenous MAR Unhold 12/15/23 0515)   cefTRIAXone (ROCEPHIN) 1,000 mg in dextrose 5 % 50 mL IVPB ( Intravenous MAR Unhold 12/15/23 0515)     And   azithromycin (ZITHROMAX) 500 mg in sodium chloride 0.9 % 250 mL IVPB ( Intravenous MAR Unhold 12/15/23 0515)   insulin glargine (LANTUS) subcutaneous injection 10 Units 0.1 mL (has no administration in time range)   sodium chloride 0.9 % bolus 1,000 mL (0 mL Intravenous Stopped 12/14/23 0908)   ceftriaxone (ROCEPHIN) 2 g/50 mL in dextrose IVPB (0 mg Intravenous Stopped 12/14/23 0940)   vancomycin (VANCOCIN) 1,750 mg in sodium chloride 0.9 % 500 mL IVPB (0 mg Intravenous Stopped 12/14/23 1800)   hydrocortisone (Solu-CORTEF) injection 100 mg (100 mg Intravenous Given 12/14/23 1011)   midodrine (PROAMATINE) tablet 10 mg (10 mg Oral Given 12/14/23 1013)    norepinephrine (LEVOPHED) 1 mg/mL injection **ADS Override Pull** (4 mg  Given 12/14/23 0910)     XR chest 1 view portable   ED Interpretation   RIGHT CVC in place, above RA.       Final Result      Left lingula and lower lobe consolidation, worsened since prior exam. This findings are compatible with progression of pneumonia and suspicious for aspiration. Similar mild patchy right   basilar opacities, also likely infectious/inflammatory.      The study was marked in EPIC for immediate notification.         Resident: KENIA RHODES I, the attending radiologist, have reviewed the images and agree with the final report above.      Workstation performed: [id number]         XR chest 1 view portable   ED Interpretation   No obvious lobar pneumonia.      Final Result      Mild perihilar and bibasilar opacities, left more than right, suggestive of an infectious/inflammatory process.         Resident: KENIA RHODES I, the attending radiologist, have reviewed the images and agree with the final report above.      Workstation performed: [id number]           Orders Placed This Encounter   Procedures    Central Line    Blood culture #1    Blood culture #2    Stool Enteric Bacterial Panel by PCR    Clostridium difficile toxin by PCR with EIA    Sputum culture and Gram stain    Legionella antigen, Urine    MRSA culture    XR chest 1 view portable    XR chest 1 view portable    APTT    Protime-INR    CBC and differential    Comprehensive metabolic panel    Lactic acid, plasma (w/reflex if result > 2.0)    Procalcitonin    UA w Reflex to Microscopic w Reflex to Culture    HS Troponin 0hr (reflex protocol)    Lactic acid 2 Hours    HS Troponin I 2hr    HS Troponin I 4hr    Urine Microscopic    Comprehensive metabolic panel    Magnesium    CBC (With Platelets)    Procalcitonin, Next Day AM Collection    Hepatic function panel    Diet Cardiovascular; Cardiac    Insert and maintain peripheral IV x 2 (18 gauge or >)     Continuous cardiac monitoring    Insert peripheral IV    Straight cath for urine    Okay to use CVC    Nursing communication Continue IV as ordered.    Notify admitting physician    Notify admitting physician on arrival    24 Hour Telemetry Monitoring    Nursing oxygen orders / instructions    Activity as tolerated    Bathroom privileges    Vital Signs per unit routine    Up and OOB as tolerated    Insert peripheral IV    Maintain IV access    Apply SCD or Foot pumps    Intake and Output    Daily weights    Bladder Scan    Urinary retention protocol    Medication hold parameters, if not noted on medication order    Insulin Subcutaneous Notify Physician    Insulin Subcutaneous Instruction    Hypoglycemia Protocol    Fingerstick Glucose (POCT)    Vital Signs per unit routine    Up and OOB as tolerated    Elevate Head of Bed 30 degrees or greater    Oral care    Nursing Communication Provide patient with education on Pneumonia Zone Tool and document in the Education activity    Incentive spirometry    Nursing Communication Notify Provider/AP of positive CAM    Titrate O2 (oxygen) to keep saturation at    Nursing dysphagia screen    Urinary retention protocol    Nursing Communication Monitor patient for constipation and notify provider if bowel regimen not ordered.    Nursing Communication Daytime Wakefulness Hygiene From 7:00 am until 7:00 pm, potential interventions could include the following, if applicable based on clinical condition: - Ambient light should reflect daytime (lights on, window shades and door...    Nursing Communication Nighttime Sleep Hygiene From 7:00 pm until 7:00 am, potential interventions could include the following, if applicable based on clinical condition: - Ambient light in room to reflect nighttime (lights off, window shades close...    Level 1-Full Code: all life saving measures are indicated    Strict Contact and Hand Hygiene with Soap and Water Isolation Status    Occupational Therapy  Evaluation and Treatment    Physical Therapy  Evaluation and Treatment    ECG 12 lead    ECG 12 lead    ECG 12 lead    ECG 12 lead    INPATIENT ADMISSION    Aspiration precautions    Fall precautions     Labs Reviewed   PROTIME-INR - Abnormal       Result Value Ref Range Status    Protime 16.6 (*) 11.6 - 14.5 seconds Final    INR 1.36 (*) 0.84 - 1.19 Final   CBC AND DIFFERENTIAL - Abnormal    WBC 9.98  4.31 - 10.16 Thousand/uL Final    RBC 3.18 (*) 3.88 - 5.62 Million/uL Final    Hemoglobin 10.5 (*) 12.0 - 17.0 g/dL Final    Hematocrit 31.8 (*) 36.5 - 49.3 % Final     (*) 82 - 98 fL Final    MCH 33.0  26.8 - 34.3 pg Final    MCHC 33.0  31.4 - 37.4 g/dL Final    RDW 14.8  11.6 - 15.1 % Final    MPV 11.0  8.9 - 12.7 fL Final    Platelets 115 (*) 149 - 390 Thousands/uL Final    nRBC 0  /100 WBCs Final    Neutrophils Relative 72  43 - 75 % Final    Immat GRANS % 1  0 - 2 % Final    Lymphocytes Relative 15  14 - 44 % Final    Monocytes Relative 11  4 - 12 % Final    Eosinophils Relative 1  0 - 6 % Final    Basophils Relative 0  0 - 1 % Final    Neutrophils Absolute 7.22  1.85 - 7.62 Thousands/µL Final    Immature Grans Absolute 0.06  0.00 - 0.20 Thousand/uL Final    Lymphocytes Absolute 1.49  0.60 - 4.47 Thousands/µL Final    Monocytes Absolute 1.13  0.17 - 1.22 Thousand/µL Final    Eosinophils Absolute 0.05  0.00 - 0.61 Thousand/µL Final    Basophils Absolute 0.03  0.00 - 0.10 Thousands/µL Final   COMPREHENSIVE METABOLIC PANEL - Abnormal    Sodium 134 (*) 135 - 147 mmol/L Final    Potassium 3.4 (*) 3.5 - 5.3 mmol/L Final    Chloride 103  96 - 108 mmol/L Final    CO2 24  21 - 32 mmol/L Final    ANION GAP 7  mmol/L Final    BUN 19  5 - 25 mg/dL Final    Creatinine 1.31 (*) 0.60 - 1.30 mg/dL Final    Comment: Standardized to IDMS reference method    Glucose 242 (*) 65 - 140 mg/dL Final    Comment: If the patient is fasting, the ADA then defines impaired fasting glucose as > 100 mg/dL and diabetes as > or equal to  123 mg/dL.    Calcium 8.0 (*) 8.4 - 10.2 mg/dL Final    Corrected Calcium 8.5  8.3 - 10.1 mg/dL Final    AST 20  13 - 39 U/L Final    ALT 10  7 - 52 U/L Final    Comment: Specimen collection should occur prior to Sulfasalazine administration due to the potential for falsely depressed results.     Alkaline Phosphatase 60  34 - 104 U/L Final    Total Protein 5.7 (*) 6.4 - 8.4 g/dL Final    Albumin 3.4 (*) 3.5 - 5.0 g/dL Final    Total Bilirubin 0.60  0.20 - 1.00 mg/dL Final    Comment: Use of this assay is not recommended for patients undergoing treatment with eltrombopag due to the potential for falsely elevated results.  N-acetyl-p-benzoquinone imine (metabolite of Acetaminophen) will generate erroneously low results in samples for patients that have taken an overdose of Acetaminophen.    eGFR 56  ml/min/1.73sq m Final    Narrative:     National Kidney Disease Foundation guidelines for Chronic Kidney Disease (CKD):     Stage 1 with normal or high GFR (GFR > 90 mL/min/1.73 square meters)    Stage 2 Mild CKD (GFR = 60-89 mL/min/1.73 square meters)    Stage 3A Moderate CKD (GFR = 45-59 mL/min/1.73 square meters)    Stage 3B Moderate CKD (GFR = 30-44 mL/min/1.73 square meters)    Stage 4 Severe CKD (GFR = 15-29 mL/min/1.73 square meters)    Stage 5 End Stage CKD (GFR <15 mL/min/1.73 square meters)  Note: GFR calculation is accurate only with a steady state creatinine   LACTIC ACID, PLASMA (W/REFLEX IF RESULT > 2.0) - Abnormal    LACTIC ACID 2.2 (*) 0.5 - 2.0 mmol/L Final    Narrative:     Result may be elevated if tourniquet was used during collection.   PROCALCITONIN TEST - Abnormal    Procalcitonin 3.05 (*) <=0.25 ng/ml Final    Comment: Suspected Lower Respiratory Tract Infection (LRTI):  - LESS than or EQUAL to 0.25 ng/mL:   low likelihood for bacterial LRTI; antibiotics DISCOURAGED.  - GREATER than 0.25 ng/mL:   increased likelihood for bacterial LRTI; antibiotics ENCOURAGED.    Suspected Sepsis:  - Strongly  consider initiating antibiotics in ALL UNSTABLE patients.  - LESS than or EQUAL to 0.5 ng/mL:   low likelihood for bacterial sepsis; antibiotics DISCOURAGED.  - GREATER than 0.5 ng/mL:   increased likelihood for bacterial sepsis; antibiotics ENCOURAGED.  - GREATER than 2 ng/mL:   high risk for severe sepsis / septic shock; antibiotics strongly ENCOURAGED.    Decisions on antibiotic use should not be based solely on Procalcitonin (PCT) levels. If PCT is low but uncertainty exists with stopping antibiotics, repeat PCT in 6-24 hours to confirm the low level. If antibiotics are administered (regardless if initial PCT was high or low), repeat PCT every 1-2 days to consider early antibiotic cessation (when GREATER than 80% decrease from the peak OR when PCT drops below designated cutoffs, whichever comes first), so long as the infection is NOT one that typically requires prolonged treatment durations (e.g., bone/joint infections, endocarditis, Staph. aureus bacteremia).    Situations of FALSE-POSITIVE Procalcitonin values:  1) Newborns < 72 hours old  2) Massive stress from severe trauma / burns, major surgery, acute pancreatitis, cardiogenic / hemorrhagic shock, sickle cell crisis, or other organ perfusion abnormalities  3) Malaria and some Candidal infections  4) Treatment with agents that stimulate cytokines (e.g., OKT3, anti-lymphocyte globulins, alemtuzumab, IL-2, granulocyte transfusion [NOT GCSFs])  5) Chronic renal disease causes elevated baseline levels (consider GREATER than 0.75 ng/mL as an abnormal cut-off); initiating HD/CRRT may cause transient decreases  6) Paraneoplastic syndromes from medullary thyroid or SCLC, some forms of vasculitis, and acute vwmim-rg-smce disease    Situations of FALSE-NEGATIVE Procalcitonin values:  1) Too early in clinical course for PCT to have reached its peak (may repeat in 6-24 hours to confirm low level)  2) Localized infection WITHOUT systemic (SIRS / sepsis) response (e.g.,  "an abscess, osteomyelitis, cystitis)  3) Mycobacteria (e.g., Tuberculosis, MAC)  4) Cystic fibrosis exacerbations     UA W REFLEX TO MICROSCOPIC WITH REFLEX TO CULTURE - Abnormal    Color, UA Light Yellow   Final    Clarity, UA Clear   Final    Specific Gravity, UA 1.010  1.003 - 1.030 Final    Comment: High concentrations of glucose or protein may affect the specific gravity    pH, UA 5.0  4.5, 5.0, 5.5, 6.0, 6.5, 7.0, 7.5, 8.0 Final    Leukocytes, UA   (*) Negative Final    Value: Elevated glucose may cause decreased leukocyte values. See urine microscopic for UWBC result    Nitrite, UA Negative  Negative Final    Protein, UA Negative  Negative mg/dl Final    Glucose, UA >=1000 (1%) (*) Negative mg/dl Final    Comment: Elevated glucose may cause false negative leukocyte esterase    Ketones, UA Negative  Negative mg/dl Final    Urobilinogen, UA <2.0  <2.0 mg/dl mg/dl Final    Bilirubin, UA Negative  Negative Final    Occult Blood, UA Negative  Negative Final   HS TROPONIN I 0HR - Abnormal    hs TnI 0hr 150 (*) \"Refer to ACS Flowchart\"- see link ng/L Final    Comment:                                              Initial (time 0) result  If >=50 ng/L, Myocardial injury suggested ;  Type of myocardial injury and treatment strategy  to be determined.  If 5-49 ng/L, a delta result at 2 hours and or 4 hours will be needed to further evaluate.  If <4 ng/L, and chest pain has been >3 hours since onset, patient may qualify for discharge based on the HEART score in the ED.  If <5 ng/L and <3hours since onset of chest pain, a delta result at 2 hours will be needed to further evaluate.    HS Troponin 99th Percentile URL of a Health Population=12 ng/L with a 95% Confidence Interval of 8-18 ng/L.    Second Troponin (time 2 hours)  If calculated delta >= 20 ng/L,  Myocardial injury suggested ; Type of myocardial injury and treatment strategy to be determined.  If 5-49 ng/L and the calculated delta is 5-19 ng/L, consult medical " "service for evaluation.  Continue evaluation for ischemia on ecg and other possible etiology and repeat hs troponin at 4 hours.  If delta is <5 ng/L at 2 hours, consider discharge based on risk stratification via the HEART score (if in ED), or APOLINAR risk score in IP/Observation.    HS Troponin 99th Percentile URL of a Health Population=12 ng/L with a 95% Confidence Interval of 8-18 ng/L.   HS TROPONIN I 2HR - Abnormal    hs TnI 2hr 124 (*) \"Refer to ACS Flowchart\"- see link ng/L Final    Comment:                                              Initial (time 0) result  If >=50 ng/L, Myocardial injury suggested ;  Type of myocardial injury and treatment strategy  to be determined.  If 5-49 ng/L, a delta result at 2 hours and or 4 hours will be needed to further evaluate.  If <4 ng/L, and chest pain has been >3 hours since onset, patient may qualify for discharge based on the HEART score in the ED.  If <5 ng/L and <3hours since onset of chest pain, a delta result at 2 hours will be needed to further evaluate.    HS Troponin 99th Percentile URL of a Health Population=12 ng/L with a 95% Confidence Interval of 8-18 ng/L.    Second Troponin (time 2 hours)  If calculated delta >= 20 ng/L,  Myocardial injury suggested ; Type of myocardial injury and treatment strategy to be determined.  If 5-49 ng/L and the calculated delta is 5-19 ng/L, consult medical service for evaluation.  Continue evaluation for ischemia on ecg and other possible etiology and repeat hs troponin at 4 hours.  If delta is <5 ng/L at 2 hours, consider discharge based on risk stratification via the HEART score (if in ED), or APOLINAR risk score in IP/Observation.    HS Troponin 99th Percentile URL of a Health Population=12 ng/L with a 95% Confidence Interval of 8-18 ng/L.    Delta 2hr hsTnI -26  <20 ng/L Final   URINE MICROSCOPIC - Abnormal    RBC, UA None Seen  None Seen, 1-2 /hpf Final    WBC, UA 1-2  None Seen, 1-2 /hpf Final    Epithelial Cells None Seen  None " Seen, Occasional /hpf Final    Bacteria, UA Occasional  None Seen, Occasional /hpf Final    Hyaline Casts, UA 0-3 (*) None Seen /lpf Final    Renal Epithelial Cells Present   Final   APTT - Normal    PTT 35  23 - 37 seconds Final    Comment: Therapeutic Heparin Range =  60-90 seconds   LACTIC ACID 2 HOUR - Normal    LACTIC ACID 1.4  0.5 - 2.0 mmol/L Final    Narrative:     Result may be elevated if tourniquet was used during collection.   BLOOD CULTURE    Blood Culture Received in Microbiology Lab. Culture in Progress.   Preliminary   BLOOD CULTURE    Blood Culture Received in Microbiology Lab. Culture in Progress.   Preliminary   STOOL ENTERIC BACTERIAL PANEL BY PCR   CLOSTRIDIUM DIFFICILE TOXIN BY PCR WITH EIA     Time reflects when diagnosis was documented in both MDM as applicable and the Disposition within this note       Time User Action Codes Description Comment    12/14/2023 10:37 AM Clemente, Dixon Add [N17.9] BENRA (acute kidney injury) (formerly [facility])     12/14/2023 10:37 AM Clemente, Dixon Add [E87.20] Lactic acidosis     12/14/2023 10:37 AM Clemente, Dixon Add [R79.89] Elevated procalcitonin     12/14/2023 10:37 AM Clemente, Dixon Add [R50.9] Febrile     12/14/2023 10:38 AM Clemente, Dixon Add [I95.9] Hypotension     12/14/2023 10:38 AM Clemente, Dixon Modify [N17.9] BERNA (acute kidney injury) (formerly [facility])     12/14/2023 10:38 AM Clemente, Dixon Modify [I95.9] Hypotension           ED Disposition       ED Disposition   Admit    Condition   Stable    Date/Time   Thu Dec 14, 2023 12:10 PM    Comment   Case was discussed with MANJEET and the patient's admission status was agreed to be Admission Status: inpatient status to the service of Dr. Desmond Ty .               Follow-up Information    None       Current Discharge Medication List        CONTINUE these medications which have NOT CHANGED    Details   apixaban (Eliquis) 5 mg Take 1 tablet (5 mg total) by mouth 2 (two) times a day  Qty: 60 tablet,  Refills: 2    Comments: DX Code Needed  .  Associated Diagnoses: Type 2 MI (myocardial infarction) (HCC)      aspirin 81 mg chewable tablet Chew 81 mg daily      atorvastatin (LIPITOR) 10 mg tablet Take 1 tablet by mouth daily      calcitriol (ROCALTROL) 0.25 mcg capsule Take 1 capsule (0.25 mcg total) by mouth daily Do not start before November 15, 2023.  Qty: 30 capsule, Refills: 0    Associated Diagnoses: Adrenal insufficiency (HCC)      Continuous Blood Gluc  (Dexcom G7 ) CYN Use 1 each continuous  Qty: 1 each, Refills: 0    Associated Diagnoses: Type 1 diabetes mellitus with hypoglycemia and without coma (HCC)      Continuous Blood Gluc Sensor (Dexcom G7 Sensor) Use 1 Device every 10 days  Qty: 3 each, Refills: 5    Comments: DX Code Needed  .  Associated Diagnoses: Type 1 diabetes mellitus with hypoglycemia and without coma (HCC)      cyanocobalamin (VITAMIN B-12) 100 MCG tablet Take 1 tablet (100 mcg total) by mouth daily Do not start before November 15, 2023.  Qty: 30 tablet, Refills: 0    Associated Diagnoses: Vitamin B 12 deficiency      ferrous sulfate 324 (65 Fe) mg TAKE 1 TABLET (324 MG TOTAL) BY MOUTH DAILY BEFORE BREAKFAST  Qty: 90 tablet, Refills: 2    Associated Diagnoses: Iron deficiency anemia, unspecified      fludrocortisone (FLORINEF) 0.1 mg tablet Take 2 tablets (0.2 mg total) by mouth daily  Qty: 60 tablet, Refills: 0    Comments: DX Code Needed  .  Associated Diagnoses: Adrenal insufficiency (HCC)      hydrocortisone (CORTEF) 5 mg tablet Use 4tabs (20mg) in morning and 3 tabs (15mg) daily afternoon  Qty: 360 tablet, Refills: 3    Associated Diagnoses: Adrenal insufficiency (HCC)      insulin glargine (Toujeo Max SoloStar) 300 units/mL CONCENTRATED U-300 injection pen (2-unit dial) Inject 18 Units under the skin daily at bedtime    Associated Diagnoses: Insulin dependent type 1 diabetes mellitus (HCC)      insulin lispro (HumaLOG KwikPen) 100 units/mL injection pen 6u tidac  plus 1u/50 above 150mg/dL; max dose 40u/day  Qty: 36 mL, Refills: 1    Associated Diagnoses: Insulin dependent type 1 diabetes mellitus (HCC)      !! Insulin Pen Needle (BD Pen Needle Josie 2nd Gen) 32G X 4 MM MISC For use with insulin pen 4 times daily. Pharmacy may dispense brand covered by insurance.  Qty: 200 each, Refills: 3    Associated Diagnoses: Insulin dependent type 1 diabetes mellitus (HCC)      !! Insulin Pen Needle (BD Pen Needle Josie 2nd Gen) 32G X 4 MM MISC FOR USE WITH INSULIN PEN. PHARMACY MAY DISPENSE BRAND COVERED BY INSURANCE.  Qty: 100 each, Refills: 5    Comments: DX Code Needed  .  Associated Diagnoses: Insulin dependent type 1 diabetes mellitus (HCC)      magnesium Oxide (MAG-OX) 400 mg TABS Take 1 tablet (400 mg total) by mouth 2 (two) times a day  Refills: 0    Associated Diagnoses: Hypomagnesemia      midodrine (PROAMATINE) 10 MG tablet Take 1.5 tablets (15 mg total) by mouth 3 (three) times a day before meals  Qty: 405 tablet, Refills: 3    Associated Diagnoses: Syncope      polyethylene glycol (GOLYTELY) 4000 mL solution Take 4,000 mL by mouth once for 1 dose  Qty: 4000 mL, Refills: 0    Associated Diagnoses: Screening for colon cancer      potassium chloride (Klor-Con) 10 mEq tablet Take 20 mEq by mouth 2 (two) times a day      risperiDONE (RisperDAL) 1 mg tablet Take 1 tablet by mouth 2 (two) times a day      sertraline (ZOLOFT) 100 mg tablet Take 100 mg by mouth daily      sodium chloride 1 g tablet Take 1 tablet (1 g total) by mouth 3 (three) times a day  Qty: 90 tablet, Refills: 2    Associated Diagnoses: Adrenal insufficiency (HCC)       !! - Potential duplicate medications found. Please discuss with provider.        No discharge procedures on file.  Prior to Admission Medications   Prescriptions Last Dose Informant Patient Reported? Taking?   Continuous Blood Gluc  (Dexcom G7 ) CYN   No No   Sig: Use 1 each continuous   Continuous Blood Gluc Sensor (Dexcom G7  Sensor)   No No   Sig: Use 1 Device every 10 days   Insulin Pen Needle (BD Pen Needle Josie 2nd Gen) 32G X 4 MM MISC   No No   Sig: For use with insulin pen 4 times daily. Pharmacy may dispense brand covered by insurance.   Insulin Pen Needle (BD Pen Needle Josie 2nd Gen) 32G X 4 MM MISC   No No   Sig: FOR USE WITH INSULIN PEN. PHARMACY MAY DISPENSE BRAND COVERED BY INSURANCE.   apixaban (Eliquis) 5 mg   No No   Sig: Take 1 tablet (5 mg total) by mouth 2 (two) times a day   aspirin 81 mg chewable tablet   Yes No   Sig: Chew 81 mg daily   atorvastatin (LIPITOR) 10 mg tablet  Child Yes No   Sig: Take 1 tablet by mouth daily   calcitriol (ROCALTROL) 0.25 mcg capsule   No No   Sig: Take 1 capsule (0.25 mcg total) by mouth daily Do not start before November 15, 2023.   cyanocobalamin (VITAMIN B-12) 100 MCG tablet   No No   Sig: Take 1 tablet (100 mcg total) by mouth daily Do not start before November 15, 2023.   ferrous sulfate 324 (65 Fe) mg   No No   Sig: TAKE 1 TABLET (324 MG TOTAL) BY MOUTH DAILY BEFORE BREAKFAST   fludrocortisone (FLORINEF) 0.1 mg tablet   No No   Sig: Take 2 tablets (0.2 mg total) by mouth daily   hydrocortisone (CORTEF) 5 mg tablet   No No   Sig: Use 4tabs (20mg) in morning and 3 tabs (15mg) daily afternoon   insulin glargine (Toujeo Max SoloStar) 300 units/mL CONCENTRATED U-300 injection pen (2-unit dial)   No No   Sig: Inject 18 Units under the skin daily at bedtime   insulin lispro (HumaLOG KwikPen) 100 units/mL injection pen   No No   Siu tidac plus 1u/50 above 150mg/dL; max dose 40u/day   magnesium Oxide (MAG-OX) 400 mg TABS   No No   Sig: Take 1 tablet (400 mg total) by mouth 2 (two) times a day   midodrine (PROAMATINE) 10 MG tablet   No No   Sig: Take 1.5 tablets (15 mg total) by mouth 3 (three) times a day before meals   polyethylene glycol (GOLYTELY) 4000 mL solution   No No   Sig: Take 4,000 mL by mouth once for 1 dose   potassium chloride (Klor-Con) 10 mEq tablet  Child Yes No   Sig:  "Take 20 mEq by mouth 2 (two) times a day   risperiDONE (RisperDAL) 1 mg tablet  Child Yes No   Sig: Take 1 tablet by mouth 2 (two) times a day   sertraline (ZOLOFT) 100 mg tablet  Child Yes No   Sig: Take 100 mg by mouth daily   sodium chloride 1 g tablet   No No   Sig: Take 1 tablet (1 g total) by mouth 3 (three) times a day      Facility-Administered Medications: None                        Portions of the record may have been created with voice recognition software. Occasional wrong word or \"sound a like\" substitutions may have occurred due to the inherent limitations of voice recognition software. Read the chart carefully and recognize, using context, where substitutions have occurred.    Electronically signed by:  Dixon Cornejo MD  12/15/23 0848    "

## 2023-12-14 NOTE — ASSESSMENT & PLAN NOTE
Lab Results   Component Value Date    HGBA1C 8.8 (H) 11/11/2023      Latest Reference Range & Units 12/14/23 08:25   Glucose, Random 65 - 140 mg/dL 242 (H)   (H): Data is abnormally high    Blood Sugar Average: Last 72 hrs:  Outpatient regimen glargine 18 units at bedtime with lispro 6 units 3 times daily  While inpatient and with poor oral intake will start with Lantus 10 units at bedtime with insulin sliding scale  Accu-Cheks ordered.  Hypoglycemia protocol

## 2023-12-14 NOTE — ASSESSMENT & PLAN NOTE
In the setting of adrenal insufficiency  Continue hydrocortisone and fludrocortisone  Continue midodrine 3 times daily

## 2023-12-14 NOTE — ASSESSMENT & PLAN NOTE
Patient was noted to have altered mental status at SNF, he returned to baseline in the ED  Suspect secondary to shock--hypotension, sepsis  On my evaluation patient is alert and oriented x 3 and is able to provide history.  Consider CT head if any acute neurodeficits

## 2023-12-14 NOTE — ASSESSMENT & PLAN NOTE
Troponin 150 we repeat 124  EKG ordered  Patient denies chest pain, shortness of breath  Suspect Typ 2 MI from hypotension/shock  No need for further trending

## 2023-12-14 NOTE — ASSESSMENT & PLAN NOTE
Continue home hydrocortisone 20mg a.m., 15 mg p.m., fludrocortisone 0.2 mg daily   Outpatient endocrinology follow-up

## 2023-12-14 NOTE — ED NOTES
4mg of norepinephrine mixed into 250ml of NS, leveophed drip started at 5 mcg/min.     Sandy Betancourt RN  12/14/23 0562

## 2023-12-14 NOTE — H&P
[facility]  H&P  Name: Karson You 68 y.o. male I MRN: [id number]  Unit/Bed#: ICCU 205-01 I Date of Admission: 12/14/2023   Date of Service: 12/14/2023 I Hospital Day: 0      Assessment/Plan   * Shock (HCC)  Assessment & Plan  Patient found to have systolic blood pressure in the 70s while in the ED.  Received 1 L IV bolus with no improvement. He was then given midodrine, hydrocortisone IV and Levophed. Pressor was wean after 1 hour with continued stable vitals  Initial lactate 2.2, now normalized to 1.4  Etiology of shock suspect multifactorial  Hypovolemic: Patient reports 4+ episodes of diarrhea for the last 3 days, dehydration  Developing septic?: Patient with fever in EMS.  Suspect source GI with reported diarrhea versus respiratory.   Chronic adrenal insufficiency and ?missed home med this morning at facility  Regarding GI patient with no abdominal pain on my exam and no diarrhea while in the hospital.  Could consider CT abdomen pelvis if patient develops abdominal pain, diarrhea restarts or becomes unstable.C. difficile and stool studies ordered and pending   Even though patient has no respiratory symptoms chest x-ray reviewed by me shows a left lung opacity and with elevated procalcitonin suspect left lung PNA. Continue antibiotics as below and f/u final CXR read  UA unremarkable patient with no UTI symptoms. Less likely CNS as patient mentation is now back to baseline and no neck rigidity.  No visible cellulitis on extremities  Follow blood cultures  Obtain sputum culture and Legionella  S/p vancomycin and ceftriaxone in the ED  Patient meets criteria for severe CAP with low risk MDRO--Start ceftriaxone and azithromycin  Continue IV fluid resuscitation  Continue home hydrocortisone and fludrocortisone    Acute metabolic encephalopathy  Assessment & Plan  Patient was noted to have altered mental status at SNF, he returned to baseline in the ED  Suspect secondary to  shock--hypotension, sepsis  On my evaluation patient is alert and oriented x 3 and is able to provide history.  Consider CT head if any acute neurodeficits    Elevated troponin  Assessment & Plan  Troponin 150 we repeat 124  EKG ordered  Patient denies chest pain, shortness of breath  Suspect Typ 2 MI from hypotension/shock  No need for further trending    Diarrhea  Assessment & Plan  Patient reports more than 4 episodes of loose diarrhea for the last 3 days.  He has had no diarrhea while in the hospital  C. difficile and stool studies ordered and currently pending to be obtained.  Low suspicion of C. difficile as patient with no gastric acid suppression or recent antibiotic use also diarrhea not described as watery and has since stopped  Supportive care with IV hydration    Hypokalemia  Assessment & Plan  Potassium 3.4 on admission  Continue potassium supplementations twice daily  Repeat in the morning    BERNA (acute kidney injury) ([facility])  Assessment & Plan  Baseline creatinine 0.8-1, creatinine on admission 1.3  Multifactorial in the setting of shock and poor oral intake  Continue IV fluids  Repeat BMP in the morning  Avoid nephrotoxins    Orthostatic hypotension  Assessment & Plan  In the setting of adrenal insufficiency  Continue hydrocortisone and fludrocortisone  Continue midodrine 3 times daily    Anemia of chronic disease  Assessment & Plan  Hemoglobin at baseline 10-11.  Continue to monitor and transfuse if less than 7    T1DM (type 1 diabetes mellitus) ([facility])  Assessment & Plan  Lab Results   Component Value Date    HGBA1C 8.8 (H) 11/11/2023      Latest Reference Range & Units 12/14/23 08:25   Glucose, Random 65 - 140 mg/dL 242 (H)   (H): Data is abnormally high    Blood Sugar Average: Last 72 hrs:  Outpatient regimen glargine 18 units at bedtime with lispro 6 units 3 times daily  While inpatient and with poor oral intake will start with Lantus 10 units at bedtime with insulin sliding scale  Accu-Cheks  ordered.  Hypoglycemia protocol      Paroxysmal atrial fibrillation (HCC)  Assessment & Plan  Not on rate control medications due to chronic hypotension  Continue Eliquis for anticoagulation      History of CVA (cerebrovascular accident)  Assessment & Plan  Continue aspirin and statin    Adrenal insufficiency (HCC)  Assessment & Plan  Continue home hydrocortisone 20mg a.m., 15 mg p.m., fludrocortisone 0.2 mg daily   Outpatient endocrinology follow-up           VTE Pharmacologic Prophylaxis: VTE Score: 10 High Risk (Score >/= 5) - Pharmacological DVT Prophylaxis Ordered: apixaban (Eliquis). Sequential Compression Devices Ordered.  Code Status: Level 1 - Full Code   Discussion with family: Updated  (daughter) via phone.    Anticipated Length of Stay: Patient will be admitted on an inpatient basis with an anticipated length of stay of greater than 2 midnights secondary to monitoring post shock, IV fluids, IV antibiotics pending final blood cultures.    Total Time Spent on Date of Encounter in care of patient: 70 mins. This time was spent on one or more of the following: performing physical exam; counseling and coordination of care; obtaining or reviewing history; documenting in the medical record; reviewing/ordering tests, medications or procedures; communicating with other healthcare professionals and discussing with patient's family/caregivers.    Chief Complaint: Altered mental status, fever    History of Present Illness:  Karson You is a 68 y.o. male with a PMH of adrenal insufficiency, orhtostatic hypotension, type 1 DM, paroxysmal atrial fibrillation, history of CVA and anemia who presents with fever and altered mental status at [facility].  Patient was also noted to be febrile while in EMS, Tylenol was given.  He reports having multiple episodes of loose, not watery diarrhea over the last 3 days.  He has not had diarrhea while in the hospital.  He does not recall being confused this morning.  He  reports adequate intake.    Review of Systems:  Review of Systems   Constitutional:  Positive for fever. Negative for chills.   HENT:  Negative for congestion and sore throat.    Respiratory:  Negative for cough, chest tightness and shortness of breath.    Cardiovascular:  Negative for chest pain and palpitations.   Gastrointestinal:  Positive for diarrhea. Negative for abdominal pain, nausea and vomiting.   Genitourinary:  Negative for difficulty urinating, dysuria and frequency.   Musculoskeletal:  Negative for arthralgias and back pain.   Skin:  Negative for rash.   Neurological:  Negative for dizziness, weakness, light-headedness and headaches.   Psychiatric/Behavioral:  Positive for confusion. The patient is not nervous/anxious.        Past Medical and Surgical History:   Past Medical History:   Diagnosis Date    A-fib (HCC)     Adrenal insufficiency (HCC)     Atrial fibrillation (HCC)     Diabetes mellitus (HCC)     Obesity, morbid (HCC) 11/14/2022    Stroke (HCC)        No past surgical history on file.    Meds/Allergies:  Prior to Admission medications    Medication Sig Start Date End Date Taking? Authorizing Provider   apixaban (Eliquis) 5 mg Take 1 tablet (5 mg total) by mouth 2 (two) times a day 10/29/23   Júnior Reza MD   aspirin 81 mg chewable tablet Chew 81 mg daily    Historical Provider, MD   atorvastatin (LIPITOR) 10 mg tablet Take 1 tablet by mouth daily 7/12/22   Historical Provider, MD   calcitriol (ROCALTROL) 0.25 mcg capsule Take 1 capsule (0.25 mcg total) by mouth daily Do not start before November 15, 2023. 11/15/23   Lizet Herron MD   Continuous Blood Gluc  (Dexcom G7 ) CYN Use 1 each continuous 10/23/23   Lewis Butts MD   Continuous Blood Gluc Sensor (Dexcom G7 Sensor) Use 1 Device every 10 days 11/29/23   Lewis Butts MD   cyanocobalamin (VITAMIN B-12) 100 MCG tablet Take 1 tablet (100 mcg total) by mouth daily Do not start before November 15, 2023.  11/15/23   Lizet Herron MD   ferrous sulfate 324 (65 Fe) mg TAKE 1 TABLET (324 MG TOTAL) BY MOUTH DAILY BEFORE BREAKFAST 6/26/23   Lloyd Mosqueda MD   fludrocortisone (FLORINEF) 0.1 mg tablet Take 2 tablets (0.2 mg total) by mouth daily 12/1/23   Lewis Butts MD   hydrocortisone (CORTEF) 5 mg tablet Use 4tabs (20mg) in morning and 3 tabs (15mg) daily afternoon 12/1/23   Lewis Butts MD   insulin glargine (Toujeo Max SoloStar) 300 units/mL CONCENTRATED U-300 injection pen (2-unit dial) Inject 18 Units under the skin daily at bedtime 12/7/23   Kayleigh Dykes DO   insulin lispro (HumaLOG KwikPen) 100 units/mL injection pen 6u tidac plus 1u/50 above 150mg/dL; max dose 40u/day 12/1/23   Lewis Butts MD   Insulin Pen Needle (BD Pen Needle Josie 2nd Gen) 32G X 4 MM MISC For use with insulin pen 4 times daily. Pharmacy may dispense brand covered by insurance. 10/26/23   Lewis Butts MD   Insulin Pen Needle (BD Pen Needle Josie 2nd Gen) 32G X 4 MM MISC FOR USE WITH INSULIN PEN. PHARMACY MAY DISPENSE BRAND COVERED BY INSURANCE. 12/1/23   Lewis Butts MD   magnesium Oxide (MAG-OX) 400 mg TABS Take 1 tablet (400 mg total) by mouth 2 (two) times a day 12/7/23   Kayleigh Dykes DO   midodrine (PROAMATINE) 10 MG tablet Take 1.5 tablets (15 mg total) by mouth 3 (three) times a day before meals 10/27/23 10/21/24  Júnior Reza MD   polyethylene glycol (GOLYTELY) 4000 mL solution Take 4,000 mL by mouth once for 1 dose 9/11/23 9/11/23  Paula Oviedo PA-C   potassium chloride (Klor-Con) 10 mEq tablet Take 20 mEq by mouth 2 (two) times a day 10/13/22 10/13/23  Lucio Francis MD   risperiDONE (RisperDAL) 1 mg tablet Take 1 tablet by mouth 2 (two) times a day    Historical Provider, MD   sertraline (ZOLOFT) 100 mg tablet Take 100 mg by mouth daily 3/16/22 12/4/23  Historical Provider, MD   sodium chloride 1 g tablet Take 1 tablet (1 g total) by mouth 3 (three) times a day 11/14/23    Lizet Herron MD     I have reviewed home medications using recent Epic encounter.    Allergies:   Allergies   Allergen Reactions    Imipramine Other (See Comments)    Lisinopril Other (See Comments)    Paroxetine Other (See Comments)    Penicillins Hives    Rosiglitazone Other (See Comments)    Troglitazone Other (See Comments)       Social History:  Marital Status: Single   Occupation: Unknown  Patient Pre-hospital Living Situation: Skilled Nursing Facility: [facility]  Patient Pre-hospital Level of Mobility: unable to be assessed at time of evaluation  Patient Pre-hospital Diet Restrictions: None  Substance Use History:   Social History     Substance and Sexual Activity   Alcohol Use Not Currently    Alcohol/week: 5.0 standard drinks of alcohol    Types: 5 Cans of beer per week    Comment: Quit in august 2022     Social History     Tobacco Use   Smoking Status Former    Current packs/day: 0.25    Average packs/day: 0.3 packs/day for 30.0 years (7.5 ttl pk-yrs)    Types: Cigarettes   Smokeless Tobacco Never     Social History     Substance and Sexual Activity   Drug Use Never       Family History:  Family History   Problem Relation Age of Onset    Heart disease Mother     Cancer Father     Multiple sclerosis Sister        Physical Exam:     Vitals:   Blood Pressure: 104/57 (12/14/23 1151)  Pulse: 78 (12/14/23 1151)  Temperature: 97.8 °F (36.6 °C) (12/14/23 0812)  Temp Source: Oral (12/14/23 0812)  Respirations: 16 (12/14/23 1151)  SpO2: 99 % (12/14/23 1151)    Physical Exam  Vitals and nursing note reviewed.   Constitutional:       General: He is not in acute distress.     Appearance: He is not toxic-appearing.   HENT:      Head: Normocephalic and atraumatic.   Eyes:      Conjunctiva/sclera: Conjunctivae normal.      Pupils: Pupils are equal, round, and reactive to light.   Cardiovascular:      Rate and Rhythm: Normal rate and regular rhythm.      Pulses: Normal pulses.   Pulmonary:      Effort:  Pulmonary effort is normal. No respiratory distress.      Breath sounds: Normal breath sounds. No wheezing or rales.   Abdominal:      General: Abdomen is flat. Bowel sounds are normal.      Palpations: Abdomen is soft.      Tenderness: There is no abdominal tenderness. There is no guarding.   Musculoskeletal:         General: No deformity.      Cervical back: Neck supple. No rigidity.      Right lower leg: No edema.      Left lower leg: No edema.   Skin:     General: Skin is warm and dry.      Findings: No erythema or lesion.   Neurological:      General: No focal deficit present.      Mental Status: He is alert and oriented to person, place, and time.   Psychiatric:         Mood and Affect: Mood normal.         Behavior: Behavior normal.              Additional Data:     Lab Results:  Results from last 7 days   Lab Units 12/14/23  0825   WBC Thousand/uL 9.98   HEMOGLOBIN g/dL 10.5*   HEMATOCRIT % 31.8*   PLATELETS Thousands/uL 115*   NEUTROS PCT % 72   LYMPHS PCT % 15   MONOS PCT % 11   EOS PCT % 1     Results from last 7 days   Lab Units 12/14/23  0825   SODIUM mmol/L 134*   POTASSIUM mmol/L 3.4*   CHLORIDE mmol/L 103   CO2 mmol/L 24   BUN mg/dL 19   CREATININE mg/dL 1.31*   ANION GAP mmol/L 7   CALCIUM mg/dL 8.0*   ALBUMIN g/dL 3.4*   TOTAL BILIRUBIN mg/dL 0.60   ALK PHOS U/L 60   ALT U/L 10   AST U/L 20   GLUCOSE RANDOM mg/dL 242*     Results from last 7 days   Lab Units 12/14/23  0825   INR  1.36*     Results from last 7 days   Lab Units 12/14/23  1352   POC GLUCOSE mg/dl 366*         Results from last 7 days   Lab Units 12/14/23  1023 12/14/23  0825   LACTIC ACID mmol/L 1.4 2.2*   PROCALCITONIN ng/ml  --  3.05*       Lines/Drains:  Invasive Devices       Central Venous Catheter Line  Duration             CVC Central Lines 12/14/23 Triple 16cm <1 day              Peripheral Intravenous Line  Duration             Peripheral IV 12/14/23 Distal;Left;Upper;Ventral (anterior) Arm <1 day                    Central  Line:  Goal for removal: Will discontinue when hemodynamically stable.           Imaging: Personally reviewed the following imaging: chest xray  XR chest 1 view portable   ED Interpretation by Dixon Cornejo MD (12/14 1017)   RIGHT CVC in place, above RA.       XR chest 1 view portable   ED Interpretation by Dixon Cornejo MD (12/14 0907)   No obvious lobar pneumonia.          EKG and Other Studies Reviewed on Admission:   EKG: No EKG obtained.    ** Please Note: This note has been constructed using a voice recognition system. **

## 2023-12-14 NOTE — ASSESSMENT & PLAN NOTE
Potassium 3.4 on admission  Continue potassium supplementations twice daily  Repeat in the morning

## 2023-12-14 NOTE — SEPSIS NOTE
Sepsis Note   Karson You 68 y.o. male MRN: [id number]  Unit/Bed#: ED 27 Encounter: [id number]       Initial Sepsis Screening       Row Name 12/14/23 0927                Is the patient's history suggestive of a new or worsening infection? Yes (Proceed)  -VB        Suspected source of infection suspect infection, source unknown  -VB        Indicate SIRS criteria --        Are two or more of the above signs & symptoms of infection both present and new to the patient? No  -VB                  User Key  (r) = Recorded By, (t) = Taken By, (c) = Cosigned By      Initials Name Provider Type    VB Dixon Cornejo MD Physician                        There is no height or weight on file to calculate BMI.  Wt Readings from Last 1 Encounters:   12/05/23 67.6 kg (149 lb)        Ideal body weight: 59.2 kg (130 lb 8.2 oz)  Adjusted ideal body weight: 62.6 kg (137 lb 14.5 oz)

## 2023-12-14 NOTE — ASSESSMENT & PLAN NOTE
Baseline creatinine 0.8-1, creatinine on admission 1.3  Multifactorial in the setting of shock and poor oral intake  Continue IV fluids  Repeat BMP in the morning  Avoid nephrotoxins

## 2023-12-14 NOTE — ASSESSMENT & PLAN NOTE
Patient reports more than 4 episodes of loose diarrhea for the last 3 days.  He has had no diarrhea while in the hospital  C. difficile and stool studies ordered and currently pending to be obtained.  Low suspicion of C. difficile as patient with no gastric acid suppression or recent antibiotic use also diarrhea not described as watery and has since stopped  Supportive care with IV hydration

## 2023-12-14 NOTE — ED NOTES
Admitting physician at bedside evaluating patient. RN awaiting to transport patient to ready bed.     Sandy Betancourt RN  12/14/23 4021

## 2023-12-14 NOTE — ED PROCEDURE NOTE
Procedure  Central Line    Date/Time: 12/14/2023 9:45 AM    Performed by: Nilsa Pearson MD  Authorized by: Nilsa Pearson MD    Patient location:  ED  Other Assisting Provider: Yes (comment) (Annette Palladino)    Consent:     Consent obtained:  Written    Consent given by:  Patient    Risks discussed:  Arterial puncture, bleeding, infection, incorrect placement and pneumothorax    Alternatives discussed:  No treatment and delayed treatment  Universal protocol:     Procedure explained and questions answered to patient or proxy's satisfaction: yes      Relevant documents present and verified: yes      Immediately prior to procedure, a time out was called: yes      Patient identity confirmed:  Verbally with patient and arm band  Pre-procedure details:     Hand hygiene: Hand hygiene performed prior to insertion      Sterile barrier technique: All elements of maximal sterile technique followed      Skin preparation:  ChloraPrep    Skin preparation agent: Skin preparation agent completely dried prior to procedure    Indications:     Central line indications: medications requiring central line    Anesthesia (see MAR for exact dosages):     Anesthesia method:  Local infiltration    Local anesthetic:  Lidocaine 1% w/o epi  Procedure details:     Location:  Right internal jugular    Vessel type: vein      Laterality:  Right    Approach: percutaneous technique used      Patient position:  Flat    Catheter type:  Triple lumen 16cm    Catheter size:  7 Fr    Landmarks identified: yes      Ultrasound guidance: yes      Ultrasound image availability:  Images available in PACS and still images obtained    Sterile ultrasound techniques: Sterile gel and sterile probe covers were used      Number of attempts:  1    Successful placement: yes    Post-procedure details:     Post-procedure:  Dressing applied and line sutured    Assessment:  Free fluid flow, blood return through all ports, no pneumothorax on x-ray and placement  verified by x-ray    Post-procedure complications: none      Patient tolerance of procedure:  Tolerated well, no immediate complications                   Nilsa Pearson MD  12/14/23 3320

## 2023-12-14 NOTE — RESPIRATORY THERAPY NOTE
12/14/23 1430   Respiratory Protocol   Protocol Initiated? Yes   Protocol Selection Respiratory   Language Barrier? No   Medical & Social History Reviewed? Yes   Diagnostic Studies Reviewed? Yes   Physical Assessment Performed? Yes   Respiratory Plan No distress/Pulmonary history   Respiratory Assessment   Assessment Type Assess only   General Appearance Alert;Awake   Respiratory Pattern Normal   Chest Assessment Chest expansion symmetrical   Bilateral Breath Sounds Diminished;Clear   Resp Comments Pt admitted for shock, hx of BERNA,anemia, no respiratory disease. Pt has decreased clear BS. No medications needed at this time. Will d/c protocol.

## 2023-12-14 NOTE — ASSESSMENT & PLAN NOTE
Not on rate control medications due to chronic hypotension  Continue Eliquis for anticoagulation

## 2023-12-15 LAB
ALBUMIN SERPL BCP-MCNC: 3.1 G/DL (ref 3.5–5)
ALP SERPL-CCNC: 68 U/L (ref 34–104)
ALT SERPL W P-5'-P-CCNC: 22 U/L (ref 7–52)
ANION GAP SERPL CALCULATED.3IONS-SCNC: 9 MMOL/L
AST SERPL W P-5'-P-CCNC: 31 U/L (ref 13–39)
BILIRUB SERPL-MCNC: 0.43 MG/DL (ref 0.2–1)
BUN SERPL-MCNC: 16 MG/DL (ref 5–25)
C DIFF TOX GENS STL QL NAA+PROBE: NEGATIVE
CALCIUM ALBUM COR SERPL-MCNC: 8.3 MG/DL (ref 8.3–10.1)
CALCIUM SERPL-MCNC: 7.6 MG/DL (ref 8.4–10.2)
CHLORIDE SERPL-SCNC: 106 MMOL/L (ref 96–108)
CO2 SERPL-SCNC: 22 MMOL/L (ref 21–32)
CREAT SERPL-MCNC: 0.96 MG/DL (ref 0.6–1.3)
ERYTHROCYTE [DISTWIDTH] IN BLOOD BY AUTOMATED COUNT: 14.6 % (ref 11.6–15.1)
GFR SERPL CREATININE-BSD FRML MDRD: 82 ML/MIN/1.73SQ M
GLUCOSE SERPL-MCNC: 285 MG/DL (ref 65–140)
GLUCOSE SERPL-MCNC: 296 MG/DL (ref 65–140)
GLUCOSE SERPL-MCNC: 305 MG/DL (ref 65–140)
GLUCOSE SERPL-MCNC: 310 MG/DL (ref 65–140)
GLUCOSE SERPL-MCNC: 335 MG/DL (ref 65–140)
HCT VFR BLD AUTO: 27.6 % (ref 36.5–49.3)
HGB BLD-MCNC: 9.1 G/DL (ref 12–17)
L PNEUMO1 AG UR QL IA.RAPID: NEGATIVE
MAGNESIUM SERPL-MCNC: 1.5 MG/DL (ref 1.9–2.7)
MCH RBC QN AUTO: 32.2 PG (ref 26.8–34.3)
MCHC RBC AUTO-ENTMCNC: 33 G/DL (ref 31.4–37.4)
MCV RBC AUTO: 98 FL (ref 82–98)
MRSA NOSE QL CULT: NORMAL
PLATELET # BLD AUTO: 93 THOUSANDS/UL (ref 149–390)
PMV BLD AUTO: 12 FL (ref 8.9–12.7)
POTASSIUM SERPL-SCNC: 3.6 MMOL/L (ref 3.5–5.3)
PROCALCITONIN SERPL-MCNC: 2.95 NG/ML
PROT SERPL-MCNC: 5.2 G/DL (ref 6.4–8.4)
RBC # BLD AUTO: 2.83 MILLION/UL (ref 3.88–5.62)
SODIUM SERPL-SCNC: 137 MMOL/L (ref 135–147)
WBC # BLD AUTO: 5.92 THOUSAND/UL (ref 4.31–10.16)

## 2023-12-15 PROCEDURE — 80053 COMPREHEN METABOLIC PANEL: CPT | Performed by: STUDENT IN AN ORGANIZED HEALTH CARE EDUCATION/TRAINING PROGRAM

## 2023-12-15 PROCEDURE — 97167 OT EVAL HIGH COMPLEX 60 MIN: CPT

## 2023-12-15 PROCEDURE — 84145 PROCALCITONIN (PCT): CPT | Performed by: STUDENT IN AN ORGANIZED HEALTH CARE EDUCATION/TRAINING PROGRAM

## 2023-12-15 PROCEDURE — 99233 SBSQ HOSP IP/OBS HIGH 50: CPT | Performed by: STUDENT IN AN ORGANIZED HEALTH CARE EDUCATION/TRAINING PROGRAM

## 2023-12-15 PROCEDURE — 83735 ASSAY OF MAGNESIUM: CPT | Performed by: STUDENT IN AN ORGANIZED HEALTH CARE EDUCATION/TRAINING PROGRAM

## 2023-12-15 PROCEDURE — 87493 C DIFF AMPLIFIED PROBE: CPT | Performed by: STUDENT IN AN ORGANIZED HEALTH CARE EDUCATION/TRAINING PROGRAM

## 2023-12-15 PROCEDURE — 97163 PT EVAL HIGH COMPLEX 45 MIN: CPT

## 2023-12-15 PROCEDURE — 85027 COMPLETE CBC AUTOMATED: CPT | Performed by: STUDENT IN AN ORGANIZED HEALTH CARE EDUCATION/TRAINING PROGRAM

## 2023-12-15 PROCEDURE — 82948 REAGENT STRIP/BLOOD GLUCOSE: CPT

## 2023-12-15 RX ORDER — INSULIN GLARGINE 100 [IU]/ML
10 INJECTION, SOLUTION SUBCUTANEOUS ONCE
Status: COMPLETED | OUTPATIENT
Start: 2023-12-15 | End: 2023-12-15

## 2023-12-15 RX ORDER — MAGNESIUM SULFATE HEPTAHYDRATE 40 MG/ML
2 INJECTION, SOLUTION INTRAVENOUS ONCE
Status: COMPLETED | OUTPATIENT
Start: 2023-12-15 | End: 2023-12-15

## 2023-12-15 RX ORDER — INSULIN GLARGINE 100 [IU]/ML
18 INJECTION, SOLUTION SUBCUTANEOUS
Status: DISCONTINUED | OUTPATIENT
Start: 2023-12-15 | End: 2023-12-21 | Stop reason: HOSPADM

## 2023-12-15 RX ORDER — AZITHROMYCIN 250 MG/1
500 TABLET, FILM COATED ORAL EVERY 24 HOURS
Status: DISCONTINUED | OUTPATIENT
Start: 2023-12-15 | End: 2023-12-16

## 2023-12-15 RX ADMIN — FLUDROCORTISONE ACETATE 0.2 MG: 0.1 TABLET ORAL at 08:47

## 2023-12-15 RX ADMIN — MAGNESIUM SULFATE HEPTAHYDRATE 2 G: 40 INJECTION, SOLUTION INTRAVENOUS at 17:55

## 2023-12-15 RX ADMIN — APIXABAN 5 MG: 5 TABLET, FILM COATED ORAL at 17:54

## 2023-12-15 RX ADMIN — RISPERIDONE 1 MG: 1 TABLET ORAL at 17:54

## 2023-12-15 RX ADMIN — SERTRALINE HYDROCHLORIDE 100 MG: 100 TABLET ORAL at 08:46

## 2023-12-15 RX ADMIN — CALCITRIOL 0.25 MCG: 0.25 CAPSULE, LIQUID FILLED ORAL at 08:45

## 2023-12-15 RX ADMIN — RISPERIDONE 1 MG: 1 TABLET ORAL at 08:46

## 2023-12-15 RX ADMIN — HYDROCORTISONE 15 MG: 10 TABLET ORAL at 17:57

## 2023-12-15 RX ADMIN — POTASSIUM CHLORIDE 20 MEQ: 750 TABLET, EXTENDED RELEASE ORAL at 17:54

## 2023-12-15 RX ADMIN — ASPIRIN 81 MG CHEWABLE TABLET 81 MG: 81 TABLET CHEWABLE at 08:45

## 2023-12-15 RX ADMIN — INSULIN LISPRO 2 UNITS: 100 INJECTION, SOLUTION INTRAVENOUS; SUBCUTANEOUS at 08:48

## 2023-12-15 RX ADMIN — POTASSIUM CHLORIDE 20 MEQ: 750 TABLET, EXTENDED RELEASE ORAL at 08:46

## 2023-12-15 RX ADMIN — SODIUM CHLORIDE, SODIUM GLUCONATE, SODIUM ACETATE, POTASSIUM CHLORIDE, MAGNESIUM CHLORIDE, SODIUM PHOSPHATE, DIBASIC, AND POTASSIUM PHOSPHATE 100 ML/HR: .53; .5; .37; .037; .03; .012; .00082 INJECTION, SOLUTION INTRAVENOUS at 08:54

## 2023-12-15 RX ADMIN — INSULIN GLARGINE 18 UNITS: 100 INJECTION, SOLUTION SUBCUTANEOUS at 21:50

## 2023-12-15 RX ADMIN — INSULIN LISPRO 3 UNITS: 100 INJECTION, SOLUTION INTRAVENOUS; SUBCUTANEOUS at 12:23

## 2023-12-15 RX ADMIN — INSULIN LISPRO 3 UNITS: 100 INJECTION, SOLUTION INTRAVENOUS; SUBCUTANEOUS at 17:57

## 2023-12-15 RX ADMIN — INSULIN GLARGINE 10 UNITS: 100 INJECTION, SOLUTION SUBCUTANEOUS at 08:51

## 2023-12-15 RX ADMIN — MIDODRINE HYDROCHLORIDE 15 MG: 5 TABLET ORAL at 08:46

## 2023-12-15 RX ADMIN — FERROUS SULFATE TAB 325 MG (65 MG ELEMENTAL FE) 325 MG: 325 (65 FE) TAB at 08:46

## 2023-12-15 RX ADMIN — VITAM B12 100 MCG: 100 TAB at 08:46

## 2023-12-15 RX ADMIN — CEFTRIAXONE SODIUM 1000 MG: 10 INJECTION, POWDER, FOR SOLUTION INTRAVENOUS at 11:24

## 2023-12-15 RX ADMIN — ATORVASTATIN CALCIUM 10 MG: 10 TABLET, FILM COATED ORAL at 17:54

## 2023-12-15 RX ADMIN — SODIUM CHLORIDE 1 G: 1 TABLET ORAL at 17:56

## 2023-12-15 RX ADMIN — AZITHROMYCIN DIHYDRATE 500 MG: 250 TABLET, FILM COATED ORAL at 17:54

## 2023-12-15 RX ADMIN — MIDODRINE HYDROCHLORIDE 15 MG: 5 TABLET ORAL at 17:54

## 2023-12-15 RX ADMIN — HYDROCORTISONE 20 MG: 20 TABLET ORAL at 08:48

## 2023-12-15 RX ADMIN — MIDODRINE HYDROCHLORIDE 15 MG: 5 TABLET ORAL at 12:23

## 2023-12-15 RX ADMIN — SODIUM CHLORIDE 1 G: 1 TABLET ORAL at 21:50

## 2023-12-15 RX ADMIN — APIXABAN 5 MG: 5 TABLET, FILM COATED ORAL at 08:46

## 2023-12-15 RX ADMIN — SODIUM CHLORIDE 1 G: 1 TABLET ORAL at 08:46

## 2023-12-15 NOTE — PLAN OF CARE
Problem: PHYSICAL THERAPY ADULT  Goal: Performs mobility at highest level of function for planned discharge setting.  See evaluation for individualized goals.  Description: Treatment/Interventions: Functional transfer training, LE strengthening/ROM, Therapeutic exercise, Endurance training, Cognitive reorientation, Patient/family training, Equipment eval/education, Bed mobility, Gait training, Spoke to nursing          See flowsheet documentation for full assessment, interventions and recommendations.  Note: Prognosis: Good  Problem List: Decreased strength, Decreased endurance, Impaired balance, Decreased mobility, Decreased coordination, Decreased cognition, Impaired judgement, Decreased safety awareness  Assessment: Pt seen for high complexity PT evaluation due to decrease in functional mobility status compared to baseline.  Pt with active PT eval/treat orders at this time.  Pt is a 68 y.o. M who presented to [facility] with AMS and fever on 12/14/23, primary dx shock.  Pt  has a past medical history of A-fib ([facility]), Adrenal insufficiency ([facility]), Atrial fibrillation ([facility]), Diabetes mellitus ([facility]), Obesity, morbid ([facility]) (11/14/2022), and Stroke ([facility]).  Pt typically resides with spouse, however, admitted from [facility].  Pt presents with decreased strength, balance, endurance that contribute to limitations in bed mobility, functional transfers, functional mobility.  Pt requires Min A for all mobility at this time.  Pt left upright in bedside chair with chair alarm intact and with all needs in reach.  Pt will benefit from skilled therapy in order to address current impairments and functional limitations. PT to follow pt and recommending rehab once medically cleared.  The patient's AM-PAC Basic Mobility Inpatient Short Form Raw Score is 17. A Raw score of greater than 16 suggests the patient may benefit from discharge to home. Please also refer to the recommendation of the Physical Therapist for safe discharge  planning.  Based on pt current functional status, recommend rehab.  Barriers to Discharge: Inaccessible home environment, Decreased caregiver support     Rehab Resource Intensity Level, PT: II (Moderate Resource Intensity)    See flowsheet documentation for full assessment.

## 2023-12-15 NOTE — PHYSICAL THERAPY NOTE
Physical Therapy Evaluation     Patient's Name: Karson You    Admitting Diagnosis  Lactic acidosis [E87.20]  Febrile [R50.9]  Hypotension [I95.9]  BERNA (acute kidney injury) (HCC) [N17.9]  Elevated procalcitonin [R79.89]  AMS (altered mental status) [R41.82]    Problem List  Patient Active Problem List   Diagnosis    Hypotension/syncope    Fall    Adrenal insufficiency (HCC)    Orthostatic hypotension    Type 1 diabetes mellitus with hypoglycemia (HCC)    History of CVA (cerebrovascular accident)    Paroxysmal atrial fibrillation (HCC)    Recurrent hypoglycemia    T1DM (type 1 diabetes mellitus) (HCC)    Paroxysmal atrial fibrillation     History of stroke    Anemia of chronic disease    Psychiatric disorder    Type 2 MI (myocardial infarction) (HCC)    Orthostatic hypotension    Unintentional weight loss    Unspecified protein-calorie malnutrition (HCC)    Syncope    Iron deficiency anemia, unspecified    Cerebrovascular accident (CVA) (HCC)    Spells of decreased attentiveness    BERNA (acute kidney injury) (HCC)    Hypokalemia    Hypomagnesemia    Shock (HCC)    Diarrhea    Elevated troponin    Acute metabolic encephalopathy       Past Medical History  Past Medical History:   Diagnosis Date    A-fib (HCC)     Adrenal insufficiency (HCC)     Atrial fibrillation (HCC)     Diabetes mellitus (HCC)     Obesity, morbid (HCC) 11/14/2022    Stroke (HCC)        Past Surgical History  No past surgical history on file.         12/15/23 1130   PT Last Visit   PT Visit Date 12/15/23   Note Type   Note type Evaluation   Pain Assessment   Pain Assessment Tool 0-10   Pain Score No Pain   Restrictions/Precautions   Weight Bearing Precautions Per Order No   Other Precautions Cognitive;Chair Alarm;Bed Alarm;Telemetry;Multiple lines;Fall Risk   Home Living   Additional Comments Pt admitted from [facility].  Per chart, pt typically resides with his spouse and is her primary caregiver.  PT is a questionable historian.   Prior  Function   Falls in the last 6 months   (multiple per chart review)   Comments Prior to last hospital stay/rehab, pt was independent.   General   Family/Caregiver Present No   Cognition   Overall Cognitive Status Impaired   Attention Attends with cues to redirect   Orientation Level Oriented to person;Oriented to time;Oriented to situation   Memory Decreased recall of precautions;Decreased recall of recent events;Decreased short term memory   Following Commands Follows one step commands with increased time or repetition   Comments Pt requires increased time to process and respond.  Decreased safety awareness and insight.   Subjective   Subjective Pleasant and agreeable to participate.   RLE Assessment   RLE Assessment   (functionally 3+/5)   LLE Assessment   LLE Assessment   (functionally 3+/5)   Bed Mobility   Supine to Sit 5  Supervision   Additional Comments Left OOB in chair with chair alarm intact and all needs in reach.   Transfers   Sit to Stand 4  Minimal assistance   Additional items Assist x 1;Increased time required;Verbal cues   Stand to Sit 4  Minimal assistance   Additional items Assist x 1;Increased time required;Verbal cues   Additional Comments with RW   Ambulation/Elevation   Gait pattern Excessively slow;Short stride;Foward flexed;Decreased foot clearance;Improper Weight shift   Gait Assistance 4  Minimal assist   Additional items Assist x 1;Verbal cues;Tactile cues   Balance   Static Sitting Fair +   Dynamic Sitting Fair   Static Standing Fair -   Dynamic Standing Fair -   Ambulatory Fair -   Activity Tolerance   Activity Tolerance Patient limited by fatigue   Medical Staff Made Aware CARLA Juarez   Nurse Made Aware RN cleared pt to be seen by PT   Assessment   Prognosis Good   Problem List Decreased strength;Decreased endurance;Impaired balance;Decreased mobility;Decreased coordination;Decreased cognition;Impaired judgement;Decreased safety awareness   Assessment Pt seen for high complexity PT  evaluation due to decrease in functional mobility status compared to baseline.  Pt with active PT eval/treat orders at this time.  Pt is a 68 y.o. M who presented to [facility] with AMS and fever on 12/14/23, primary dx shock.  Pt  has a past medical history of A-fib (HCC), Adrenal insufficiency (HCC), Atrial fibrillation (HCC), Diabetes mellitus (HCC), Obesity, morbid (HCC) (11/14/2022), and Stroke (HCC).  Pt typically resides with spouse, however, admitted from [facility].  Pt presents with decreased strength, balance, endurance that contribute to limitations in bed mobility, functional transfers, functional mobility.  Pt requires Min A for all mobility at this time.  Pt left upright in bedside chair with chair alarm intact and with all needs in reach.  Pt will benefit from skilled therapy in order to address current impairments and functional limitations. PT to follow pt and recommending rehab once medically cleared.  The patient's AM-PAC Basic Mobility Inpatient Short Form Raw Score is 17. A Raw score of greater than 16 suggests the patient may benefit from discharge to home. Please also refer to the recommendation of the Physical Therapist for safe discharge planning.  Based on pt current functional status, recommend rehab.   Barriers to Discharge Inaccessible home environment;Decreased caregiver support   Goals   Patient Goals to go home   STG Expiration Date 12/29/23   Short Term Goal #1 1. Pt will demonstrate ability to perform all aspects of bed mobility with Mod I in order to increase independence and decrease burden on caregivers. 2. Pt will demonstrate ability to perform functional transfers with Mod I in order to increase independence and decrease burden on caregivers.  3. Pt will demonstrate ability to ambulate 200 ft with least restrictive AD with Mod I in order to return to mobility safely. 4. Pt will demonstrate ability to negotiate full flight steps with/without HR and Mod I in order to return  to household/community mobility safely. 5. Pt will demonstrate improved balance by one grade order to decrease risk of falls.  6. Pt will increase b/l LE strength by 1 grade in order to increase ease of functional mobility and transfers.   Plan   Treatment/Interventions Functional transfer training;LE strengthening/ROM;Therapeutic exercise;Endurance training;Cognitive reorientation;Patient/family training;Equipment eval/education;Bed mobility;Gait training;Spoke to nursing   PT Frequency 3-5x/wk   Discharge Recommendation   Rehab Resource Intensity Level, PT II (Moderate Resource Intensity)   AM-PAC Basic Mobility Inpatient   Turning in Flat Bed Without Bedrails 3   Lying on Back to Sitting on Edge of Flat Bed Without Bedrails 3   Moving Bed to Chair 3   Standing Up From Chair Using Arms 3   Walk in Room 3   Climb 3-5 Stairs With Railing 2   Basic Mobility Inpatient Raw Score 17   Basic Mobility Standardized Score 39.67   Highest Level Of Mobility   JH-HLM Goal 5: Stand one or more mins   JH-HLM Achieved 7: Walk 25 feet or more   Modified Miller Scale   Modified Miller Scale 4         Maranda Bryan, PT, DPT

## 2023-12-15 NOTE — PLAN OF CARE
Problem: PAIN - ADULT  Goal: Verbalizes/displays adequate comfort level or baseline comfort level  Description: Interventions:  - Encourage patient to monitor pain and request assistance  - Assess pain using appropriate pain scale  - Administer analgesics based on type and severity of pain and evaluate response  - Implement non-pharmacological measures as appropriate and evaluate response  - Consider cultural and social influences on pain and pain management  - Notify physician/advanced practitioner if interventions unsuccessful or patient reports new pain  Outcome: Progressing     Problem: INFECTION - ADULT  Goal: Absence or prevention of progression during hospitalization  Description: INTERVENTIONS:  - Assess and monitor for signs and symptoms of infection  - Monitor lab/diagnostic results  - Monitor all insertion sites, i.e. indwelling lines, tubes, and drains  - Monitor endotracheal if appropriate and nasal secretions for changes in amount and color  - [place] appropriate cooling/warming therapies per order  - Administer medications as ordered  - Instruct and encourage patient and family to use good hand hygiene technique  - Identify and instruct in appropriate isolation precautions for identified infection/condition  Outcome: Progressing  Goal: Absence of fever/infection during neutropenic period  Description: INTERVENTIONS:  - Monitor WBC    Outcome: Progressing     Problem: SAFETY ADULT  Goal: Patient will remain free of falls  Description: INTERVENTIONS:  - Educate patient/family on patient safety including physical limitations  - Instruct patient to call for assistance with activity   - Consult OT/PT to assist with strengthening/mobility   - Keep Call bell within reach  - Keep bed low and locked with side rails adjusted as appropriate  - Keep care items and personal belongings within reach  - Initiate and maintain comfort rounds  - Make Fall Risk Sign visible to staff  - Offer Toileting every  Hours,  in advance of need  - Initiate/Maintain alarm  - Obtain necessary fall risk management equipment:   - Apply yellow socks and bracelet for high fall risk patients  - Consider moving patient to room near nurses station  Outcome: Progressing  Goal: Maintain or return to baseline ADL function  Description: INTERVENTIONS:  -  Assess patient's ability to carry out ADLs; assess patient's baseline for ADL function and identify physical deficits which impact ability to perform ADLs (bathing, care of mouth/teeth, toileting, grooming, dressing, etc.)  - Assess/evaluate cause of self-care deficits   - Assess range of motion  - Assess patient's mobility; develop plan if impaired  - Assess patient's need for assistive devices and provide as appropriate  - Encourage maximum independence but intervene and supervise when necessary  - Involve family in performance of ADLs  - Assess for home care needs following discharge   - Consider OT consult to assist with ADL evaluation and planning for discharge  - Provide patient education as appropriate  Outcome: Progressing  Goal: Maintains/Returns to pre admission functional level  Description: INTERVENTIONS:  - Perform AM-PAC 6 Click Basic Mobility/ Daily Activity assessment daily.  - Set and communicate daily mobility goal to care team and patient/family/caregiver.   - Collaborate with rehabilitation services on mobility goals if consulted  - Perform Range of Motion  times a day.  - Reposition patient every  hours.  - Dangle patient  times a day  - Stand patient  times a day  - Ambulate patient  times a day  - Out of bed to chair  times a day   - Out of bed for meals times a day  - Out of bed for toileting  - Record patient progress and toleration of activity level   Outcome: Progressing     Problem: DISCHARGE PLANNING  Goal: Discharge to home or other facility with appropriate resources  Description: INTERVENTIONS:  - Identify barriers to discharge w/patient and caregiver  - Arrange for  needed discharge resources and transportation as appropriate  - Identify discharge learning needs (meds, wound care, etc.)  - Arrange for interpretive services to assist at discharge as needed  - Refer to Case Management Department for coordinating discharge planning if the patient needs post-hospital services based on physician/advanced practitioner order or complex needs related to functional status, cognitive ability, or social support system  Outcome: Progressing     Problem: Knowledge Deficit  Goal: Patient/family/caregiver demonstrates understanding of disease process, treatment plan, medications, and discharge instructions  Description: Complete learning assessment and assess knowledge base.  Interventions:  - Provide teaching at level of understanding  - Provide teaching via preferred learning methods  Outcome: Progressing     Problem: CARDIOVASCULAR - ADULT  Goal: Maintains optimal cardiac output and hemodynamic stability  Description: INTERVENTIONS:  - Monitor I/O, vital signs and rhythm  - Monitor for S/S and trends of decreased cardiac output  - Administer and titrate ordered vasoactive medications to optimize hemodynamic stability  - Assess quality of pulses, skin color and temperature  - Assess for signs of decreased coronary artery perfusion  - Instruct patient to report change in severity of symptoms  Outcome: Progressing     Problem: GASTROINTESTINAL - ADULT  Goal: Maintains or returns to baseline bowel function  Description: INTERVENTIONS:  - Assess bowel function  - Encourage oral fluids to ensure adequate hydration  - Administer IV fluids if ordered to ensure adequate hydration  - Administer ordered medications as needed  - Encourage mobilization and activity  - Consider nutritional services referral to assist patient with adequate nutrition and appropriate food choices  Outcome: Progressing     Problem: GENITOURINARY - ADULT  Goal: Maintains or returns to baseline urinary function  Description:  INTERVENTIONS:  - Assess urinary function  - Encourage oral fluids to ensure adequate hydration if ordered  - Administer IV fluids as ordered to ensure adequate hydration  - Administer ordered medications as needed  - Offer frequent toileting  - Follow urinary retention protocol if ordered  Outcome: Progressing  Goal: Absence of urinary retention  Description: INTERVENTIONS:  - Assess patient’s ability to void and empty bladder  - Monitor I/O  - Bladder scan as needed  - Discuss with physician/AP medications to alleviate retention as needed  - Discuss catheterization for long term situations as appropriate  Outcome: Progressing     Problem: METABOLIC, FLUID AND ELECTROLYTES - ADULT  Goal: Fluid balance maintained  Description: INTERVENTIONS:  - Monitor labs   - Monitor I/O and WT  - Instruct patient on fluid and nutrition as appropriate  - Assess for signs & symptoms of volume excess or deficit  Outcome: Progressing  Goal: Glucose maintained within target range  Description: INTERVENTIONS:  - Monitor Blood Glucose as ordered  - Assess for signs and symptoms of hyperglycemia and hypoglycemia  - Administer ordered medications to maintain glucose within target range  - Assess nutritional intake and initiate nutrition service referral as needed  Outcome: Progressing     Problem: Prexisting or High Potential for Compromised Skin Integrity  Goal: Skin integrity is maintained or improved  Description: INTERVENTIONS:  - Identify patients at risk for skin breakdown  - Assess and monitor skin integrity  - Assess and monitor nutrition and hydration status  - Monitor labs   - Assess for incontinence   - Turn and reposition patient  - Assist with mobility/ambulation  - Relieve pressure over bony prominences  - Avoid friction and shearing  - Provide appropriate hygiene as needed including keeping skin clean and dry  - Evaluate need for skin moisturizer/barrier cream  - Collaborate with interdisciplinary team   - Patient/family  teaching  - Consider wound care consult   Outcome: Progressing

## 2023-12-15 NOTE — CASE MANAGEMENT
Case Management Discharge Planning Note    Patient name Karson You  Location [facility] 614/[facility] 614-01 MRN [id number]  : 1955 Date 12/15/2023       Current Admission Date: 2023  Current Admission Diagnosis:Shock (HCC)   Patient Active Problem List    Diagnosis Date Noted    Shock (HCC) 2023    Diarrhea 2023    Elevated troponin 2023    Acute metabolic encephalopathy 2023    Hypokalemia 2023    Hypomagnesemia 2023    Hypotension/syncope 11/10/2023    Fall 11/10/2023    Adrenal insufficiency (HCC) 11/10/2023    Orthostatic hypotension 11/10/2023    Type 1 diabetes mellitus with hypoglycemia ([facility]) 11/10/2023    History of CVA (cerebrovascular accident) 11/10/2023    Paroxysmal atrial fibrillation ([facility]) 11/10/2023    BERNA (acute kidney injury) ([facility]) 2023    Spells of decreased attentiveness 2023    Cerebrovascular accident (CVA) ([facility]) 2023    Iron deficiency anemia, unspecified 2023    Syncope 2023    Unspecified protein-calorie malnutrition ([facility]) 2022    Unintentional weight loss 2022    Type 2 MI (myocardial infarction) ([facility]) 2022    Orthostatic hypotension 2022    Recurrent hypoglycemia 10/19/2022    T1DM (type 1 diabetes mellitus) ([facility]) 10/19/2022    Paroxysmal atrial fibrillation  10/19/2022    History of stroke 10/19/2022    Anemia of chronic disease 10/19/2022    Psychiatric disorder 10/19/2022      LOS (days): 1  Geometric Mean LOS (GMLOS) (days): 5.1  Days to GMLOS:4     OBJECTIVE:  Risk of Unplanned Readmission Score: 38.21         Current admission status: Inpatient   Preferred Pharmacy:   [id number] [place] - [address] [place] [address]  [place] [address]  Phone: [phone] Fax: [phone]    [facility] [place] - [address]  [address]  [place] [address]  Phone: [phone] Fax: [phone]    [id number] -  [place] - [id number]  [place] [address]  Phone: [phone] Fax: [phone]    Primary Care Provider: Tree Spence MD    Primary Insurance: [facility] REP  Secondary Insurance:     DISCHARGE DETAILS:    Referral made to [facility] for readmission. Pt will need new authorization in order to return.

## 2023-12-15 NOTE — CASE MANAGEMENT
Case Management Assessment    Patient name Karson You  Location [facility] 614/[facility] 614-01 MRN [id number]  : 1955 Date 12/15/2023       Current Admission Date: 2023  Current Admission Diagnosis:Shock (HCC)   Patient Active Problem List    Diagnosis Date Noted    Shock (HCC) 2023    Diarrhea 2023    Elevated troponin 2023    Acute metabolic encephalopathy 2023    Hypokalemia 2023    Hypomagnesemia 2023    Hypotension/syncope 11/10/2023    Fall 11/10/2023    Adrenal insufficiency (HCC) 11/10/2023    Orthostatic hypotension 11/10/2023    Type 1 diabetes mellitus with hypoglycemia ([facility]) 11/10/2023    History of CVA (cerebrovascular accident) 11/10/2023    Paroxysmal atrial fibrillation ([facility]) 11/10/2023    BERNA (acute kidney injury) ([facility]) 2023    Spells of decreased attentiveness 2023    Cerebrovascular accident (CVA) ([facility]) 2023    Iron deficiency anemia, unspecified 2023    Syncope 2023    Unspecified protein-calorie malnutrition ([facility]) 2022    Unintentional weight loss 2022    Type 2 MI (myocardial infarction) ([facility]) 2022    Orthostatic hypotension 2022    Recurrent hypoglycemia 10/19/2022    T1DM (type 1 diabetes mellitus) ([facility]) 10/19/2022    Paroxysmal atrial fibrillation  10/19/2022    History of stroke 10/19/2022    Anemia of chronic disease 10/19/2022    Psychiatric disorder 10/19/2022      LOS (days): 1  Geometric Mean LOS (GMLOS) (days): 5.1  Days to GMLOS:4     OBJECTIVE:  PATIENT READMITTED TO HOSPITAL  Risk of Unplanned Readmission Score: 38.21         Current admission status: Inpatient       Preferred Pharmacy:   [id number] [place] - [address] [place] [address]  [place] [address]  Phone: [phone] Fax: [phone]    [facility] [place] - [place] - [address]  [address]  [place] [address]  Phone: [phone] Fax:  [phone]    [id number] [place] - [address]  [address]  [place] [address]  Phone: [phone] Fax: [phone]    Primary Care Provider: Tree Spence MD    Primary Insurance: [facility]  Secondary Insurance:     ASSESSMENT:  Active Health Care Proxies    There are no active Health Care Proxies on file.         Readmission Root Cause  30 Day Readmission: Yes  Who directed you to return to the hospital?: Other (comment) ([facility] Staff)  Did you understand whom to contact if you had questions or problems?: Yes  Did you get your prescriptions before you left the hospital?: Yes  Were you able to get your prescriptions filled when you left the hospital?: Yes  Did you take your medications as prescribed?: Yes  Were you able to get to your follow-up appointments?: Yes  During previous admission, was a post-acute recommendation made?: Yes  What post-acute resources were offered?: STR  Action Plan: TBD    Patient Information  Admitted from:: Facility ([facility])  Mental Status: Alert  During Assessment patient was accompanied by: Not accompanied during assessment  Assessment information provided by:: Daughter  Primary Caregiver: Family  Caregiver's Name:: Ofelia Maciel - daughter  Caregiver's Relationship to Patient:: Family Member  Caregiver's Telephone Number:: [phone]  Support Systems: Spouse/significant other, Daughter, Private Caregivers  County of Residence: [place]  What city do you live in?: [place]  Home entry access options. Select all that apply.: Stairs  Number of steps to enter home.: 5  Do the steps have railings?: Yes  Type of Current Residence: 2 story home  Upon entering residence, is there a bedroom on the main floor (no further steps)?: Yes  Upon entering residence, is there a bathroom on the main floor (no further steps)?: Yes  Living Arrangements: Lives w/ Spouse/significant other, Lives w/ Extended  Family    Activities of Daily Living Prior to Admission  Functional Status: Assistance  Completes ADLs independently?: No  Ambulates independently?: Yes  Does patient use assisted devices?: Yes  Assisted Devices (DME) used: Shower Chair, Walker  Does patient currently own DME?: Yes  What DME does the patient currently own?: Shower Chair, Walker  Does patient have a history of Outpatient Therapy (PT/OT)?: No  Does the patient have a history of Short-Term Rehab?: Yes ([facility])  Does patient have a history of HHC?: Yes ([facility])  Does patient currently have HHC?: No       Patient Information Continued  Income Source: Pension/USP  Does patient have prescription coverage?: Yes  Does patient receive dialysis treatments?: No  Does patient have a history of substance abuse?: No  Does patient have a history of Mental Health Diagnosis?: No      Means of Transportation  Means of Transport to [facility]:: Family transport      Housing Stability: Low Risk  (12/6/2023)    Housing Stability Vital Sign     Unable to Pay for Housing in the Last Year: No     Number of Places Lived in the Last Year: 1     Unstable Housing in the Last Year: No   Food Insecurity: No Food Insecurity (12/6/2023)    Hunger Vital Sign     Worried About Running Out of Food in the Last Year: Never true     Ran Out of Food in the Last Year: Never true   Transportation Needs: No Transportation Needs (12/6/2023)    PRAPARE - Transportation     Lack of Transportation (Medical): No     Lack of Transportation (Non-Medical): No   Utilities: Not At Risk (12/6/2023)    [facility] Utilities     Threatened with loss of utilities: No     Additional Comments:  CM Resident obtained assessment information from CM assessment from 12/6.  CM Resident to be available for any d/c needs.

## 2023-12-15 NOTE — PROGRESS NOTES
Patient:  DAVID KELLY    MRN:  [id number]    Aidin Request ID:  [id number]    Level of care reserved:  Skilled Nursing Facility    Partner Reserved:  [place] [place], [place] [address] [phone]    Clinical needs requested:    Geography searched:  10 miles around [address]    Start of Service:    Request sent:  3:02pm EST on 12/15/2023 by Jamar Dhaliwal    Partner reserved:  3:49pm EST on 12/15/2023 by Jamar Dhaliwal    Choice list shared:  3:48pm EST on 12/15/2023 by Jamar Dhaliwal

## 2023-12-15 NOTE — PLAN OF CARE
Problem: PAIN - ADULT  Goal: Verbalizes/displays adequate comfort level or baseline comfort level  Description: Interventions:  - Encourage patient to monitor pain and request assistance  - Assess pain using appropriate pain scale  - Administer analgesics based on type and severity of pain and evaluate response  - Implement non-pharmacological measures as appropriate and evaluate response  - Consider cultural and social influences on pain and pain management  - Notify physician/advanced practitioner if interventions unsuccessful or patient reports new pain  Outcome: Progressing     Problem: SAFETY ADULT  Goal: Patient will remain free of falls  Description: INTERVENTIONS:  - Educate patient/family on patient safety including physical limitations  - Instruct patient to call for assistance with activity   - Consult OT/PT to assist with strengthening/mobility   - Keep Call bell within reach  - Keep bed low and locked with side rails adjusted as appropriate  - Keep care items and personal belongings within reach  - Initiate and maintain comfort rounds  - Make Fall Risk Sign visible to staff  - Offer Toileting every 2 Hours, in advance of need  - Initiate/Maintain bed alarm  - Apply yellow socks and bracelet for high fall risk patients  - Consider moving patient to room near nurses station  Outcome: Progressing     Problem: CARDIOVASCULAR - ADULT  Goal: Maintains optimal cardiac output and hemodynamic stability  Description: INTERVENTIONS:  - Monitor I/O, vital signs and rhythm  - Monitor for S/S and trends of decreased cardiac output  - Administer and titrate ordered vasoactive medications to optimize hemodynamic stability  - Assess quality of pulses, skin color and temperature  - Assess for signs of decreased coronary artery perfusion  - Instruct patient to report change in severity of symptoms  Outcome: Progressing     Problem: GASTROINTESTINAL - ADULT  Goal: Maintains or returns to baseline bowel  function  Description: INTERVENTIONS:  - Assess bowel function  - Encourage oral fluids to ensure adequate hydration  - Administer IV fluids if ordered to ensure adequate hydration  - Administer ordered medications as needed  - Encourage mobilization and activity  - Consider nutritional services referral to assist patient with adequate nutrition and appropriate food choices  Outcome: Progressing     Problem: GENITOURINARY - ADULT  Goal: Maintains or returns to baseline urinary function  Description: INTERVENTIONS:  - Assess urinary function  - Encourage oral fluids to ensure adequate hydration if ordered  - Administer IV fluids as ordered to ensure adequate hydration  - Administer ordered medications as needed  - Offer frequent toileting  - Follow urinary retention protocol if ordered  Outcome: Progressing     Problem: GENITOURINARY - ADULT  Goal: Absence of urinary retention  Description: INTERVENTIONS:  - Assess patient’s ability to void and empty bladder  - Monitor I/O  - Bladder scan as needed  - Discuss with physician/AP medications to alleviate retention as needed  - Discuss catheterization for long term situations as appropriate  Outcome: Progressing

## 2023-12-15 NOTE — OCCUPATIONAL THERAPY NOTE
Occupational Therapy Evaluation     Patient Name: Karson You  Today's Date: 12/15/2023  Problem List  Principal Problem:    Shock (HCC)  Active Problems:    T1DM (type 1 diabetes mellitus) (HCC)    Anemia of chronic disease    Orthostatic hypotension    BERNA (acute kidney injury) (HCC)    Adrenal insufficiency (HCC)    History of CVA (cerebrovascular accident)    Paroxysmal atrial fibrillation (HCC)    Hypokalemia    Diarrhea    Elevated troponin    Acute metabolic encephalopathy    Past Medical History  Past Medical History:   Diagnosis Date    A-fib (HCC)     Adrenal insufficiency (HCC)     Atrial fibrillation (HCC)     Diabetes mellitus (HCC)     Obesity, morbid (HCC) 11/14/2022    Stroke (HCC)      Past Surgical History  No past surgical history on file.     12/15/23 1022   OT Last Visit   OT Visit Date 12/15/23   Note Type   Note type Evaluation   Pain Assessment   Pain Assessment Tool 0-10   Pain Score No Pain   Restrictions/Precautions   Weight Bearing Precautions Per Order No   Other Precautions Cognitive;Bed Alarm;Chair Alarm;Contact/isolation;Multiple lines;Telemetry;Fall Risk   Home Living   Home Equipment Cane   Additional Comments Pt was admitted from [facility] and is a questionable historian.  Per chart, pt typically resides with his wife and is her primary caregiver.   Prior Function   Level of [place] Independent with ADLs;Independent with functional mobility;Independent with IADLS   Lives With Spouse   IADLs Independent with driving;Independent with medication management;Independent with meal prep   Falls in the last 6 months   (per chart, multiple falls)   Vocational Retired   Lifestyle   Autonomy Prior to recent hospitalization, pt was independent with ADLs, IADL and functional mobility with use of spc.  His wife has dementia and pt is her primary caregiver   Reciprocal Relationships wife who needs assist   Service to Others retired   ADL   Eating Assistance 5  Supervision/Setup   Grooming  Assistance 5  Supervision/Setup   UB Bathing Assistance 4  Minimal Assistance   LB Bathing Assistance 3  Moderate Assistance   UB Dressing Assistance 4  Minimal Assistance   LB Dressing Assistance 3  Moderate Assistance   Toileting Assistance  4  Minimal Assistance   Bed Mobility   Supine to Sit 5  Supervision   Sit to Supine 5  Supervision   Transfers   Sit to Stand 4  Minimal assistance   Additional items Assist x 1;Increased time required   Stand to Sit 4  Minimal assistance   Additional items Assist x 1   Additional Comments use of rw and cues for safety   Functional Mobility   Functional Mobility 4  Minimal assistance   Additional Comments use of rw and cues for safety to ambulate <household distance   Balance   Static Sitting Fair +   Dynamic Sitting Fair   Static Standing Fair -   Dynamic Standing Fair -   Ambulatory Fair -   Activity Tolerance   Activity Tolerance Patient limited by fatigue   Medical Staff Made Aware PT Maranda   Nurse Made Aware Pt cleared by RN for OT evaluation and OOB mobility   RUE Assessment   RUE Assessment WFL  (generalized weakness but WNL)   LUE Assessment   LUE Assessment WFL  (generalized weakness but WNL)   Psychosocial   Psychosocial (WDL) X   Patient Behaviors/Mood Brightens with approach;Calm;Cooperative;Flat affect   Cognition   Overall Cognitive Status Impaired   Arousal/Participation Alert;Responsive;Cooperative   Attention Attends with cues to redirect   Orientation Level Oriented to person;Oriented to time;Oriented to situation  (initially states jan 2003 but corrects)   Memory Decreased recall of precautions;Decreased short term memory;Decreased recall of recent events   Following Commands Follows one step commands with increased time or repetition   Comments Pt requires significantly inc time for processing.  Would benefit from cognitive assessment   Assessment   Limitation Decreased ADL status;Decreased UE strength;Decreased Safe judgement during ADL;Decreased  cognition;Decreased endurance;Decreased self-care trans;Decreased high-level ADLs   Prognosis Fair   Assessment Pt is a 68 year old male seen for initial OT evaluation s/p admission to [facility] 12/14/23 with fever, AMS, diarrhea.  Pt dx'd with shock, acute metabolic encephalopathy and is r/o Cdiff.  Additional active problems include: elevated troponin, hypokalemia, BERNA, orthostatic hypotension, anemia, DM, paroxysmal atrial fibrillation, h/o CVA and adrenal insufficiency.  Pt was admitted from [facility] after a recent hospitalization earlier this month.  Pt typically resides with his wife who has dementia.  Pt is his wife's caregiver and typically was independent with ADLs, IADLs and ambulated with use of spc.  Pt is currently demonstrating the following occupational deficits: eating with set-up, grooming with set-up, UB bathing with Mandy, LB bathing with modA, UB dressing with Mandy, LB dressing with modA, toileting with Mandy, bed mobility with supervision, functional transfers with Mandy, and functional mobility with Mandy.  Factors currently limiting pt's independence in these areas include: cognitive deficits, generalized weakness, endurance, balance, functional mobility and unsupportive home environment.  From an OT standpoint, recommend level 2 rehab resources upon d/c.  Pt is to continue to benefit from skilled occupational therapy services while in the hospital to maximize functioning and independence with daily activities.  See below for OT goals to be addressed 2-3x/wk.   Goals   Patient Goals to go home   Plan   Treatment Interventions ADL retraining;Functional transfer training;Endurance training;UE strengthening/ROM;Cognitive reorientation;Patient/family training;Equipment evaluation/education;Neuromuscular reeducation;Compensatory technique education;Continued evaluation;Energy conservation   Goal Expiration Date 12/25/23   OT Treatment Day 1   OT Frequency 2-3x/wk   Discharge Recommendation    Rehab Resource Intensity Level, OT II (Moderate Resource Intensity)   AM-PAC Daily Activity Inpatient   Lower Body Dressing 2   Bathing 2   Toileting 3   Upper Body Dressing 3   Grooming 4   Eating 4   Daily Activity Raw Score 18   Daily Activity Standardized Score (Calc for Raw Score >=11) 38.66   AM-PAC Applied Cognition Inpatient   Following a Speech/Presentation 2   Understanding Ordinary Conversation 3   Taking Medications 2   Remembering Where Things Are Placed or Put Away 2   Remembering List of 4-5 Errands 2   Taking Care of Complicated Tasks 2   Applied Cognition Raw Score 13   Applied Cognition Standardized Score 30.46     Goals:  Pt will complete all self care tasks with Aretha.  Pt will be attentive for 100% of formal cognitive assessment for safe discharge/planning  Pt will complete all functional transfers on/off all surfaces with Aretha with use of DME as appropriate and with good safety/balance.  Pt will complete household distance functional mobility with Aretha with use of DME as appropriate and with good safety/balance  Pt will maintain standing with Aretha for at least 15 minutes while completing a dynamic functional activity with good safety/balance for carry over with IADL performance.    Ann Wilkerson, MOT, OTR/L, CSRS, CBIS  [place] License [id number]  [place] License [id number]

## 2023-12-15 NOTE — ASSESSMENT & PLAN NOTE
Patient was noted to have altered mental status at SNF, he returned to baseline in the ED  Suspect secondary to shock--hypotension, sepsis  Today's evaluation patient is alert and oriented x 3 and is able to provide history.

## 2023-12-15 NOTE — PLAN OF CARE
Problem: OCCUPATIONAL THERAPY ADULT  Goal: Performs self-care activities at highest level of function for planned discharge setting.  See evaluation for individualized goals.  Description: Treatment Interventions: ADL retraining, Functional transfer training, Endurance training, UE strengthening/ROM, Cognitive reorientation, Patient/family training, Equipment evaluation/education, Neuromuscular reeducation, Compensatory technique education, Continued evaluation, Energy conservation          See flowsheet documentation for full assessment, interventions and recommendations.   Note: Limitation: Decreased ADL status, Decreased UE strength, Decreased Safe judgement during ADL, Decreased cognition, Decreased endurance, Decreased self-care trans, Decreased high-level ADLs  Prognosis: Fair  Assessment: Pt is a 68 year old male seen for initial OT evaluation s/p admission to [facility] 12/14/23 with fever, AMS, diarrhea.  Pt dx'd with shock, acute metabolic encephalopathy and is r/o Cdiff.  Additional active problems include: elevated troponin, hypokalemia, BERNA, orthostatic hypotension, anemia, DM, paroxysmal atrial fibrillation, h/o CVA and adrenal insufficiency.  Pt was admitted from [facility] at [facility] after a recent hospitalization earlier this month.  Pt typically resides with his wife who has dementia.  Pt is his wife's caregiver and typically was independent with ADLs, IADLs and ambulated with use of spc.  Pt is currently demonstrating the following occupational deficits: eating with set-up, grooming with set-up, UB bathing with Mandy, LB bathing with modA, UB dressing with Mandy, LB dressing with modA, toileting with Mandy, bed mobility with supervision, functional transfers with Mandy, and functional mobility with Mandy.  Factors currently limiting pt's independence in these areas include: cognitive deficits, generalized weakness, endurance, balance, functional mobility and unsupportive home environment.  From an OT  standpoint, recommend level 2 rehab resources upon d/c.  Pt is to continue to benefit from skilled occupational therapy services while in the hospital to maximize functioning and independence with daily activities.  See below for OT goals to be addressed 2-3x/wk.     Rehab Resource Intensity Level, OT: II (Moderate Resource Intensity)

## 2023-12-15 NOTE — CASE MANAGEMENT
[facility] received request for authorization from Care Manager.  Authorization request for: SNF  [facility]ility Name:  [facility] NPI: [id number]   Facility MD:  Jerman Gallegos NPI: [id number]  Authorization initiated by contacting insurance: [employer]     Via: [place] submitted via: fax # [phone]  Pending Reference #: [id number]

## 2023-12-15 NOTE — UTILIZATION REVIEW
Initial Clinical Review    Admission: Date/Time/Statement:   Admission Orders (From admission, onward)       Ordered        12/14/23 1211  INPATIENT ADMISSION  Once                          Orders Placed This Encounter   Procedures    INPATIENT ADMISSION     Standing Status:   Standing     Number of Occurrences:   1     Order Specific Question:   Level of Care     Answer:   Level 2 Stepdown / HOT [14]     Order Specific Question:   Estimated length of stay     Answer:   More than 2 Midnights     Order Specific Question:   Certification     Answer:   I certify that inpatient services are medically necessary for this patient for a duration of greater than two midnights. See H&P and MD Progress Notes for additional information about the patient's course of treatment.     ED Arrival Information       Expected   -    Arrival   12/14/2023 08:10    Acuity   Emergent              Means of arrival   Ambulance    Escorted by   [facility]    Service   Hospitalist    Admission type   Emergency              Arrival complaint   AMS/fever             Chief Complaint   Patient presents with    Altered Mental Status     Pt brought in via EMS from [facility] after staff reports AMS and fevers starting this AM.       Initial Presentation: 68 y.o. male presented to ED from [facility] via EMS as inpatient admission for shock.PMH of adrenal insufficiency, orhtostatic hypotension, type 1 DM, paroxysmal atrial fibrillation, history of CVA and anemia who presents with fever and altered mental status.  He reports having multiple episodes of loose, not watery diarrhea over the last 3 days. On arrival blood pressure in the 70s while in the ED.  Received 1 L IV bolus with no improvement. He was then given midodrine, hydrocortisone IV and Levophed. Pressor was wean after 1 hour with continued stable vitals Even though patient has no respiratory symptoms chest x-ray reviewed by me shows a left lung opacity and with elevated  procalcitonin suspect left lung PNA. Continue antibiotics as below and f/u final CXR read. CVP line placed, Plan will continue IVF IV pressor d/c IV abx. Awaiting cultures Blood sugars with SSI continue Eliquis for anticoagulation A Fib, hydrocortisone 20mg a.m., 15 mg p.m., fludrocortisone 0.2 mg daily  for adrenal insufficiency midodrine 3 times daily for hypotension and supportive care    Date: 12-15-23   Day 2: Patient continues on IVF IV abx, continue to monitor e-lytes and replace as needed Continue midodrine 3 times daily blood sugars with SSI adrenal insufficiency hydrocortisone 20mg a.m., 15 mg p.m., fludrocortisone 0.2 mg daily as per daughter patient appears  mildly lethargic compared to his baseline       ED Triage Vitals   Temperature Pulse Respirations Blood Pressure SpO2   12/14/23 0812 12/14/23 0814 12/14/23 0814 12/14/23 0814 12/14/23 0814   97.8 °F (36.6 °C) 76 18 (!) 86/49 100 %      Temp Source Heart Rate Source Patient Position - Orthostatic VS BP Location FiO2 (%)   12/14/23 0812 12/14/23 0814 12/14/23 0814 12/14/23 0814 --   Oral Monitor Sitting Right arm       Pain Score       12/14/23 1600       No Pain          Wt Readings from Last 1 Encounters:   12/14/23 83.1 kg (183 lb 4.8 oz)     Additional Vital Signs:   Date/Time Temp Pulse Resp BP MAP (mmHg) SpO2 O2 Device Patient Position - Orthostatic VS   12/15/23 1152 98.3 °F (36.8 °C) 72 -- 129/69 89 97 % -- --   12/15/23 07:35:50 97.4 °F (36.3 °C) Abnormal  74 -- 167/90 116 99 % -- --   12/15/23 05:19:05 -- 84 -- 169/94 119 98 % -- --   12/15/23 05:05:26 97.8 °F (36.6 °C) 80 -- 172/94 Abnormal  120 97 % -- --   12/15/23 0500 -- -- -- -- -- 99 % None (Room air) --   12/15/23 0300 98.1 °F (36.7 °C) 78 -- 168/90 145 99 % -- --   12/14/23 2300 98.3 °F (36.8 °C) 74 -- 147/73 103 100 % -- Lying   12/14/23 2015 -- 68 -- 112/53 65 99 % -- --   12/14/23 1920 98.1 °F (36.7 °C) 72 -- 98/45 Abnormal  58 Abnormal  98 % -- --   12/14/23 1800 -- 62 -- 103/50  67 99 % -- --   12/14/23 1700 -- -- -- 100/48 Abnormal  62 Abnormal  -- -- --   12/14/23 1600 -- 70 -- 98/57 69 99 % -- --   12/14/23 1500 97.6 °F (36.4 °C) 70 -- 95/47 Abnormal  62 Abnormal  95 % -- --   12/14/23 1400 -- 72 -- 110/58 88 99 % -- --   12/14/23 1300 -- 78 -- 125/58 82 98 % -- --   12/14/23 1151 -- 78 16 104/57 76 99 % None (Room air) Sitting   12/14/23 1146 -- 76 -- 99/55 71 98 % -- --   12/14/23 1141 -- 74 -- 93/51 67 100 % -- --   12/14/23 1136 -- 76 -- 91/52 67 100 % -- --   12/14/23 1131 -- 74 -- 92/55 69 100 % -- --   12/14/23 1126 -- 74 -- 93/52 69 100 % -- --   12/14/23 1120 -- 76 -- 91/65 74 99 % -- --   12/14/23 1115 -- 76 -- 88/57 Abnormal  67 99 % -- --   12/14/23 1112 -- 74 -- 87/54 Abnormal  66 99 % -- --   12/14/23 1057 -- 78 -- 94/50 69 100 % -- --   12/14/23 1045 -- 78 16 93/50 68 100 % None (Room air) Sitting   12/14/23 1030 -- 80 18 96/52 72 100 % None (Room air) Sitting   12/14/23 1015 -- 81 16 102/54 74 97 % None (Room air) Sitting   12/14/23 0958 -- 84 -- 128/60 -- -- -- --   12/14/23 0934 -- 82 -- 122/59 85 97 % -- --   12/14/23 0930 -- 82 16 123/58 84 97 % None (Room air) Sitting   12/14/23 0924 -- 84 -- 117/56 81 95 % -- --   12/14/23 0915 -- 80 16 96/55 71 100 % None (Room air) Sitting   12/14/23 0900 -- -- -- 77/41 Abnormal  53 Abnormal  -- -- --   12/14/23 0855 -- 74 -- 76/40 Abnormal  53 Abnormal  98 % -- --   12/14/23 0845 -- 74 16 73/35 Abnormal   49 Abnormal  100 % None (Room air) Sitting   BP: Dr. arnold aware at 12/14/23 0845   12/14/23 0830 -- 76 -- 82/45 Abnormal   59 Abnormal  97 % None (Room air) Sitting   BP: Dr. Cornejo at bedside at 12/14/23 0830       Pertinent Labs/Diagnostic Test Results:     EKG 12-14-23 12-15-23  Normal sinus rhythm with occasional Premature atrial complexes  T wave abnormality, consider inferolateral ischemia  Abnormal ECG    Normal sinus rhythm  ST & T wave abnormality, consider inferolateral ischemia  Abnormal ECG    XR chest 1 view  portable    (12/14 1017)   RIGHT CVC in place, above RA.       Final Result by Emmanuel Ortiz MD (12/14 1507)      Left lingula and lower lobe consolidation, worsened since prior exam. This findings are compatible with progression of pneumonia and suspicious for aspiration. Similar mild patchy right   basilar opacities, also likely infectious/inflammatory.   XR chest 1 view portable    (12/14 0907)   No obvious lobar pneumonia.      Final Result by Emmanuel Ortiz MD (12/14 1506)      Mild perihilar and bibasilar opacities, left more than right, suggestive of an infectious/inflammatory process.         Results from last 7 days   Lab Units 12/15/23  0527 12/14/23  0825   WBC Thousand/uL 5.92 9.98   HEMOGLOBIN g/dL 9.1* 10.5*   HEMATOCRIT % 27.6* 31.8*   PLATELETS Thousands/uL 93* 115*   NEUTROS ABS Thousands/µL  --  7.22         Results from last 7 days   Lab Units 12/15/23  0527 12/14/23  0825   SODIUM mmol/L 137 134*   POTASSIUM mmol/L 3.6 3.4*   CHLORIDE mmol/L 106 103   CO2 mmol/L 22 24   ANION GAP mmol/L 9 7   BUN mg/dL 16 19   CREATININE mg/dL 0.96 1.31*   EGFR ml/min/1.73sq m 82 56   CALCIUM mg/dL 7.6* 8.0*   MAGNESIUM mg/dL 1.5*  --      Results from last 7 days   Lab Units 12/15/23  0527 12/14/23  0825   AST U/L 31 20  20   ALT U/L 22 10  10   ALK PHOS U/L 68 60  60   TOTAL PROTEIN g/dL 5.2* 5.7*  5.7*   ALBUMIN g/dL 3.1* 3.4*  3.4*   TOTAL BILIRUBIN mg/dL 0.43 0.60  0.60   BILIRUBIN DIRECT mg/dL  --  0.14     Results from last 7 days   Lab Units 12/15/23  1149 12/15/23  0736 12/14/23  2128 12/14/23  1815 12/14/23  1352   POC GLUCOSE mg/dl 310* 285* 346* 256* 366*     Results from last 7 days   Lab Units 12/15/23  0527 12/14/23  0825   GLUCOSE RANDOM mg/dL 305* 242*         Results from last 7 days   Lab Units 12/14/23  1354 12/14/23  1055 12/14/23  0857   HS TNI 0HR ng/L  --   --  150*   HS TNI 2HR ng/L  --  124*  --    HSTNI D2 ng/L  --  -26  --    HS TNI 4HR ng/L 109*  --   --    HSTNI D4 ng/L  -41  --   --          Results from last 7 days   Lab Units 12/14/23  0825   PROTIME seconds 16.6*   INR  1.36*   PTT seconds 35         Results from last 7 days   Lab Units 12/15/23  0527 12/14/23  0825   PROCALCITONIN ng/ml 2.95* 3.05*     Results from last 7 days   Lab Units 12/14/23  1023 12/14/23  0825   LACTIC ACID mmol/L 1.4 2.2*     Results from last 7 days   Lab Units 12/14/23  0856   CLARITY UA  Clear   COLOR UA  Light Yellow   SPEC GRAV UA  1.010   PH UA  5.0   GLUCOSE UA mg/dl >=1000 (1%)*   KETONES UA mg/dl Negative   BLOOD UA  Negative   PROTEIN UA mg/dl Negative   NITRITE UA  Negative   BILIRUBIN UA  Negative   UROBILINOGEN UA (BE) mg/dl <2.0   LEUKOCYTES UA  Elevated glucose may cause decreased leukocyte values. See urine microscopic for UWBC result*   WBC UA /hpf 1-2   RBC UA /hpf None Seen   BACTERIA UA /hpf Occasional   EPITHELIAL CELLS WET PREP /hpf None Seen     Results from last 7 days   Lab Units 12/14/23  2341   LEGIONELLA URINARY ANTIGEN  Negative       Results from last 7 days   Lab Units 12/14/23  0825 12/14/23  0824   BLOOD CULTURE  No Growth at 24 hrs. No Growth at 24 hrs.                   ED Treatment:   Medication Administration from 12/14/2023 0810 to 12/14/2023 1240         Date/Time Order Dose Route Action     12/14/2023 0831 EST sodium chloride 0.9 % bolus 1,000 mL 1,000 mL Intravenous New Bag     12/14/2023 1015 EST norepinephrine (LEVOPHED) 4 mg (STANDARD CONCENTRATION) IV in sodium chloride 0.9% 250 mL 1 mcg/min Intravenous Rate/Dose Change     12/14/2023 0958 EST norepinephrine (LEVOPHED) 4 mg (STANDARD CONCENTRATION) IV in sodium chloride 0.9% 250 mL 2 mcg/min Intravenous Rate/Dose Change     12/14/2023 0940 EST norepinephrine (LEVOPHED) 4 mg (STANDARD CONCENTRATION) IV in sodium chloride 0.9% 250 mL 3 mcg/min Intravenous Rate/Dose Change     12/14/2023 0929 EST norepinephrine (LEVOPHED) 4 mg (STANDARD CONCENTRATION) IV in sodium chloride 0.9% 250 mL 4 mcg/min Intravenous  Rate/Dose Change     12/14/2023 0910 EST norepinephrine (LEVOPHED) 4 mg (STANDARD CONCENTRATION) IV in sodium chloride 0.9% 250 mL 5 mcg/min Intravenous New Bag     12/14/2023 0900 EST ceftriaxone (ROCEPHIN) 2 g/50 mL in dextrose IVPB 2,000 mg Intravenous New Bag     12/14/2023 1010 EST vancomycin (VANCOCIN) 1,750 mg in sodium chloride 0.9 % 500 mL IVPB 1,750 mg Intravenous New Bag     12/14/2023 1011 EST hydrocortisone (Solu-CORTEF) injection 100 mg 100 mg Intravenous Given     12/14/2023 1013 EST midodrine (PROAMATINE) tablet 10 mg 10 mg Oral Given     12/14/2023 0910 EST norepinephrine (LEVOPHED) 1 mg/mL injection **ADS Override Pull** 4 mg  Given          Past Medical History:   Diagnosis Date    A-fib (HCC)     Adrenal insufficiency (HCC)     Atrial fibrillation (HCC)     Diabetes mellitus (HCC)     Obesity, morbid (HCC) 11/14/2022    Stroke ([facility])      Present on Admission:   Adrenal insufficiency (HCC)   Orthostatic hypotension   Paroxysmal atrial fibrillation (HCC)   T1DM (type 1 diabetes mellitus) ([facility])   BERNA (acute kidney injury) ([facility])   Hypokalemia   Anemia of chronic disease      Admitting Diagnosis: Lactic acidosis [E87.20]  Febrile [R50.9]  Hypotension [I95.9]  BERNA (acute kidney injury) (HCC) [N17.9]  Elevated procalcitonin [R79.89]  AMS (altered mental status) [R41.82]  Age/Sex: 68 y.o. male  Admission Orders:  Scheduled Medications:  apixaban, 5 mg, Oral, BID  aspirin, 81 mg, Oral, Daily  atorvastatin, 10 mg, Oral, Daily With Dinner  azithromycin, 500 mg, Oral, Q24H  calcitriol, 0.25 mcg, Oral, Daily  cefTRIAXone, 1,000 mg, Intravenous, Q24H  cyanocobalamin, 100 mcg, Oral, Daily  ferrous sulfate, 325 mg, Oral, Daily With Breakfast  fludrocortisone, 0.2 mg, Oral, Daily  hydrocortisone, 15 mg, Oral, QPM  hydrocortisone, 20 mg, Oral, QAM  insulin glargine, 10 Units, Subcutaneous, HS  insulin lispro, 1-5 Units, Subcutaneous, TID AC  midodrine, 15 mg, Oral, TID AC  potassium chloride, 20 mEq, Oral,  BID  risperiDONE, 1 mg, Oral, BID  sertraline, 100 mg, Oral, Daily  sodium chloride, 1 g, Oral, TID      Continuous IV Infusions:  multi-electrolyte, 100 mL/hr, Intravenous, Continuous      PRN Meds:  acetaminophen, 975 mg, Oral, Q6H PRN  ondansetron, 4 mg, Intravenous, Q6H PRN        None    Network Utilization Review Department  ATTENTION: Please call with any questions or concerns to [phone] and carefully listen to the prompts so that you are directed to the right person. All voicemails are confidential.   For Discharge needs, contact Care Management DC Support Team at [phone] opt. [phone]  Send all requests for admission clinical reviews, approved or denied determinations and any other requests to dedicated fax number below belonging to the [facility] where the patient is receiving treatment. List of dedicated fax numbers for the Facilities:  FACILITY NAME UR FAX NUMBER   ADMISSION DENIALS (Administrative/Medical Necessity) [phone]   DISCHARGE SUPPORT TEAM (NETWORK) [phone]   PARENT CHILD HEALTH (Maternity/NICU/Pediatrics) [phone]   [facility] [phone]   [facility] [phone]   [facility] [phone]   [facility] [phone]   [facility] [phone]   [facility] [phone]   [facility] [phone]   [facility] [phone]   [facility] [phone]   [facility] [phone]   [facility] [phone]

## 2023-12-15 NOTE — CASE MANAGEMENT
Case Management Discharge Planning Note    Patient name Karson You  Location [facility]-01 MRN [id number]  : 1955 Date 12/15/2023       Current Admission Date: 2023  Current Admission Diagnosis:Shock (HCC)   Patient Active Problem List    Diagnosis Date Noted    Shock (HCC) 2023    Diarrhea 2023    Elevated troponin 2023    Acute metabolic encephalopathy 2023    Hypokalemia 2023    Hypomagnesemia 2023    Hypotension/syncope 11/10/2023    Fall 11/10/2023    Adrenal insufficiency (HCC) 11/10/2023    Orthostatic hypotension 11/10/2023    Type 1 diabetes mellitus with hypoglycemia ([facility]) 11/10/2023    History of CVA (cerebrovascular accident) 11/10/2023    Paroxysmal atrial fibrillation ([facility]) 11/10/2023    BERNA (acute kidney injury) ([facility]) 2023    Spells of decreased attentiveness 2023    Cerebrovascular accident (CVA) ([facility]) 2023    Iron deficiency anemia, unspecified 2023    Syncope 2023    Unspecified protein-calorie malnutrition ([facility]) 2022    Unintentional weight loss 2022    Type 2 MI (myocardial infarction) ([facility]) 2022    Orthostatic hypotension 2022    Recurrent hypoglycemia 10/19/2022    T1DM (type 1 diabetes mellitus) ([facility]) 10/19/2022    Paroxysmal atrial fibrillation  10/19/2022    History of stroke 10/19/2022    Anemia of chronic disease 10/19/2022    Psychiatric disorder 10/19/2022      LOS (days): 1  Geometric Mean LOS (GMLOS) (days): 5.1  Days to GMLOS:3.9     OBJECTIVE:  Risk of Unplanned Readmission Score: 38.21         Current admission status: Inpatient   Preferred Pharmacy:   [id number] - [place], [place] - [address] [place] [address]  [place] [address]  Phone: [phone] Fax: [phone]    [facility] [place] - [address]  [address]  [place] [address]  Phone: [phone] Fax: [phone]    [id number] -  [place] - [address]  [place] [address]  Phone: [phone] Fax: [phone]    Primary Care Provider: Tree Spence MD    Primary Insurance: [facility] REP  Secondary Insurance:     DISCHARGE DETAILS:    Plan to submit for auth today.

## 2023-12-15 NOTE — ASSESSMENT & PLAN NOTE
Patient found to have systolic blood pressure in the 70s while in the ED.  Received 1 L IV bolus with no improvement. He was then given midodrine, hydrocortisone IV and Levophed. Pressor was wean after 1 hour with continued stable vitals  Initial lactate 2.2, now normalized to 1.4  Etiology of shock suspect multifactorial  Hypovolemic: Patient reports 4+ episodes of diarrhea for the last 3 days, dehydration  Infectious:  Patient did not meet SIRS in ED or during stay.  Suspect source GI with reported diarrhea versus respiratory.   Chronic adrenal insufficiency and ?missed home med this morning at facility  Regarding GI patient with no abdominal pain on my exam and no diarrhea while in the hospital.  Could consider CT abdomen pelvis if patient develops abdominal pain, diarrhea restarts or becomes unstable.C. difficile and stool studies ordered and pending   Even though patient has no respiratory symptoms chest x-ray reviewed by me shows a left lung opacity and with elevated procalcitonin suspect left lung PNA. Continue antibiotics as below and f/u final CXR read  UA unremarkable patient with no UTI symptoms. Less likely CNS as patient mentation is now back to baseline and no neck rigidity.  No visible cellulitis on extremities  Follow blood cultures  Obtain sputum culture and Legionella  S/p vancomycin and ceftriaxone in the ED  Patient meets criteria for severe CAP with low risk MDRO--Start ceftriaxone and azithromycin  Continue IV fluid resuscitation  Continue home hydrocortisone and fludrocortisone  Remove central line

## 2023-12-15 NOTE — PROGRESS NOTES
[facility]  Progress Note  Name: Karson You I  MRN: [id number]  Unit/Bed#: [facility] 614-01 I Date of Admission: 12/14/2023   Date of Service: 12/15/2023 I Hospital Day: 1    Assessment/Plan   Acute metabolic encephalopathy  Assessment & Plan  Patient was noted to have altered mental status at [facility], he returned to baseline in the ED  Suspect secondary to shock--hypotension, sepsis  Today's evaluation patient is alert and oriented x 3 and is able to provide history.    Elevated troponin  Assessment & Plan  Troponin 150 we repeat 124  EKG ordered  Patient denies chest pain, shortness of breath  Suspect Typ 2 MI from hypotension/shock  No need for further trending    Diarrhea  Assessment & Plan  Patient reports more than 4 episodes of loose diarrhea for the last 3 days.  He has had no diarrhea while in the hospital  C. difficile and stool studies ordered and currently pending to be obtained.  Low suspicion of C. difficile as patient with no gastric acid suppression or recent antibiotic use also diarrhea not described as watery and has since stopped  Supportive care with IV hydration    Hypokalemia  Assessment & Plan  Potassium 3.6 today  Continue potassium supplementations twice daily  Repeat in the morning    BERNA (acute kidney injury) (HCC)  Assessment & Plan  Baseline creatinine 0.8-1, creatinine on admission 1.3  Multifactorial in the setting of shock and poor oral intake  Continue IV fluids  Repeat BMP in the morning  Avoid nephrotoxins    Orthostatic hypotension  Assessment & Plan  In the setting of adrenal insufficiency  Continue hydrocortisone and fludrocortisone  Continue midodrine 3 times daily    Anemia of chronic disease  Assessment & Plan  Hemoglobin at baseline 10-11.  Continue to monitor and transfuse if less than 7    T1DM (type 1 diabetes mellitus) ([facility])  Assessment & Plan  Lab Results   Component Value Date    HGBA1C 8.8 (H) 11/11/2023      Latest Reference Range &  Units 12/14/23 08:25   Glucose, Random 65 - 140 mg/dL 242 (H)   (H): Data is abnormally high    Blood Sugar Average: Last 72 hrs:  (P) 312.6Outpatient regimen glargine 18 units at bedtime with lispro 6 units 3 times daily  Mentation improving and glucose uncontrolled, increase lantus to home 18U and ISS  Accu-Cheks ordered.  Hypoglycemia protocol      Paroxysmal atrial fibrillation (HCC)  Assessment & Plan  Not on rate control medications due to chronic hypotension  Continue Eliquis for anticoagulation      History of CVA (cerebrovascular accident)  Assessment & Plan  Continue aspirin and statin    Adrenal insufficiency ([facility])  Assessment & Plan  Continue home hydrocortisone 20mg a.m., 15 mg p.m., fludrocortisone 0.2 mg daily   Outpatient endocrinology follow-up    * Shock ([facility])  Assessment & Plan  Patient found to have systolic blood pressure in the 70s while in the ED.  Received 1 L IV bolus with no improvement. He was then given midodrine, hydrocortisone IV and Levophed. Pressor was wean after 1 hour with continued stable vitals  Initial lactate 2.2, now normalized to 1.4  Etiology of shock suspect multifactorial  Hypovolemic: Patient reports 4+ episodes of diarrhea for the last 3 days, dehydration  Infectious:  Patient did not meet SIRS in ED or during stay.  Suspect source GI with reported diarrhea versus respiratory.   Chronic adrenal insufficiency and ?missed home med this morning at facility  Regarding GI patient with no abdominal pain on my exam and no diarrhea while in the hospital.  Could consider CT abdomen pelvis if patient develops abdominal pain, diarrhea restarts or becomes unstable.C. difficile and stool studies ordered and pending   Even though patient has no respiratory symptoms chest x-ray reviewed by me shows a left lung opacity and with elevated procalcitonin suspect left lung PNA. Continue antibiotics as below and f/u final CXR read  UA unremarkable patient with no UTI symptoms. Less likely CNS  as patient mentation is now back to baseline and no neck rigidity.  No visible cellulitis on extremities  Follow blood cultures  Obtain sputum culture and Legionella  S/p vancomycin and ceftriaxone in the ED  Patient meets criteria for severe CAP with low risk MDRO--Start ceftriaxone and azithromycin  Continue IV fluid resuscitation  Continue home hydrocortisone and fludrocortisone  Remove central line           VTE Pharmacologic Prophylaxis: VTE Score: 10 Moderate Risk (Score 3-4) - Pharmacological DVT Prophylaxis Ordered: apixaban (Eliquis).    Mobility:   Basic Mobility Inpatient Raw Score: 15  -HLM Goal: 4: Move to chair/commode  JH-HLM Achieved: 3: Sit at edge of bed  HLM Goal NOT achieved. Continue with multidisciplinary rounding and encourage appropriate mobility to improve upon HLM goals.    Patient Centered Rounds: I performed bedside rounds with nursing staff today.   Discussions with Specialists or Other Care Team Provider: N/A    Education and Discussions with Family / Patient: Updated  (daughter) at bedside.    Total Time Spent on Date of Encounter in care of patient: 45 mins. This time was spent on one or more of the following: performing physical exam; counseling and coordination of care; obtaining or reviewing history; documenting in the medical record; reviewing/ordering tests, medications or procedures; communicating with other healthcare professionals and discussing with patient's family/caregivers.    Current Length of Stay: 1 day(s)  Current Patient Status: Inpatient   Certification Statement: The patient will continue to require additional inpatient hospital stay due to Hypotension  Discharge Plan: Anticipate discharge in 24-48 hrs to rehab facility.    Code Status: Level 1 - Full Code    Subjective:   Seen and examined at bedside.  Patient resting comfortably in bed.  He is alert and oriented today and offers no new complaints.  Daughter present at bedside states mentation is  improving although patient still appears mildly lethargic compared to his baseline    Objective:     Vitals:   Temp (24hrs), Av.9 °F (36.6 °C), Min:97.4 °F (36.3 °C), Max:98.3 °F (36.8 °C)    Temp:  [97.4 °F (36.3 °C)-98.3 °F (36.8 °C)] 98.3 °F (36.8 °C)  HR:  [62-84] 72  BP: ()/(45-94) 129/69  SpO2:  [95 %-100 %] 97 %  Body mass index is 31.46 kg/m².     Input and Output Summary (last 24 hours):     Intake/Output Summary (Last 24 hours) at 12/15/2023 1348  Last data filed at 12/15/2023 1326  Gross per 24 hour   Intake 3191.67 ml   Output 1200 ml   Net 1991.67 ml       Physical Exam:   Physical Exam  Vitals and nursing note reviewed.   Constitutional:       General: He is not in acute distress.     Appearance: He is not toxic-appearing.   HENT:      Head: Normocephalic and atraumatic.   Eyes:      Conjunctiva/sclera: Conjunctivae normal.      Pupils: Pupils are equal, round, and reactive to light.   Cardiovascular:      Rate and Rhythm: Normal rate and regular rhythm.      Pulses: Normal pulses.   Pulmonary:      Effort: Pulmonary effort is normal. No respiratory distress.      Breath sounds: Normal breath sounds. No wheezing or rales.   Abdominal:      General: Abdomen is flat. Bowel sounds are normal.      Palpations: Abdomen is soft.      Tenderness: There is no abdominal tenderness. There is no guarding.   Musculoskeletal:         General: No deformity.      Cervical back: Neck supple. No rigidity.      Right lower leg: No edema.      Left lower leg: No edema.   Skin:     General: Skin is warm and dry.      Findings: No erythema or lesion.      Comments: R sided central line present   Neurological:      General: No focal deficit present.      Mental Status: He is alert and oriented to person, place, and time.   Psychiatric:         Mood and Affect: Mood normal.         Behavior: Behavior normal.          Additional Data:     Labs:  Results from last 7 days   Lab Units 12/15/23  0527 23  0805    WBC Thousand/uL 5.92 9.98   HEMOGLOBIN g/dL 9.1* 10.5*   HEMATOCRIT % 27.6* 31.8*   PLATELETS Thousands/uL 93* 115*   NEUTROS PCT %  --  72   LYMPHS PCT %  --  15   MONOS PCT %  --  11   EOS PCT %  --  1     Results from last 7 days   Lab Units 12/15/23  0527   SODIUM mmol/L 137   POTASSIUM mmol/L 3.6   CHLORIDE mmol/L 106   CO2 mmol/L 22   BUN mg/dL 16   CREATININE mg/dL 0.96   ANION GAP mmol/L 9   CALCIUM mg/dL 7.6*   ALBUMIN g/dL 3.1*   TOTAL BILIRUBIN mg/dL 0.43   ALK PHOS U/L 68   ALT U/L 22   AST U/L 31   GLUCOSE RANDOM mg/dL 305*     Results from last 7 days   Lab Units 23  0825   INR  1.36*     Results from last 7 days   Lab Units 12/15/23  1149 12/15/23  0736 23  2128 23  1815 23  1352   POC GLUCOSE mg/dl 310* 285* 346* 256* 366*         Results from last 7 days   Lab Units 12/15/23  0527 23  1023 23  0825   LACTIC ACID mmol/L  --  1.4 2.2*   PROCALCITONIN ng/ml 2.95*  --  3.05*       Lines/Drains:  Invasive Devices       Central Venous Catheter Line  Duration             CVC Central Lines 23 Triple 16cm 1 day              Peripheral Intravenous Line  Duration             Peripheral IV 23 Distal;Left;Upper;Ventral (anterior) Arm 1 day              Drain  Duration             External Urinary Catheter Large <1 day                    Central Line:  Goal for removal: No longer needed. Will place order to discontinue.         Telemetry:  Telemetry Orders (From admission, onward)               24 Hour Telemetry Monitoring  (ED Bridging Orders Panel)  Continuous x 24 Hours (Telem)           Question:  Reason for 24 Hour Telemetry  Answer:  Arrhythmias requiring acute medical intervention / PPM or ICD malfunction                     Telemetry Reviewed: Normal Sinus Rhythm  Indication for Continued Telemetry Use: No indication for continued use. Will discontinue.              Imaging: Reviewed radiology reports from this admission including: chest  xray    Recent Cultures (last 7 days):   Results from last 7 days   Lab Units 12/14/23  2341 12/14/23  1816 12/14/23  0825 12/14/23  0824   BLOOD CULTURE   --   --  No Growth at 24 hrs. No Growth at 24 hrs.   LEGIONELLA URINARY ANTIGEN  Negative  --   --   --    C DIFF TOXIN B BY PCR   --  Negative  --   --        Last 24 Hours Medication List:   Current Facility-Administered Medications   Medication Dose Route Frequency Provider Last Rate    acetaminophen  975 mg Oral Q6H PRN Ida Cha, CRNP      apixaban  5 mg Oral BID Ida Cha, CRNP      aspirin  81 mg Oral Daily Ida Cha, CRNP      atorvastatin  10 mg Oral Daily With Dinner Ida Cha, CRNP      azithromycin  500 mg Oral Q24H Alfie Potts      calcitriol  0.25 mcg Oral Daily Ida Cha, CRNP      cefTRIAXone  1,000 mg Intravenous Q24H Ida Cha, CRNP 1,000 mg (12/15/23 1124)    cyanocobalamin  100 mcg Oral Daily Ida Cha, CRNP      ferrous sulfate  325 mg Oral Daily With Breakfast Ida Cha, CRNP      fludrocortisone  0.2 mg Oral Daily Ida Cha, CRNP      hydrocortisone  15 mg Oral QPM Ida Cha, CRNP      hydrocortisone  20 mg Oral QAM Ida Cha, CRNP      insulin glargine  18 Units Subcutaneous HS Alfie Potts      insulin lispro  1-5 Units Subcutaneous TID AC Ida Cha, CRNP      magnesium sulfate  2 g Intravenous Once Alfie Potts      midodrine  15 mg Oral TID AC Ida Cha, CRNP      multi-electrolyte  50 mL/hr Intravenous Continuous Alfie Potts 100 mL/hr (12/15/23 0854)    ondansetron  4 mg Intravenous Q6H PRN Ida Cha, CRNP      potassium chloride  20 mEq Oral BID Ida Cha, CRNP      risperiDONE  1 mg Oral BID Ida Hca, CRNP      sertraline  100 mg Oral Daily Ida Cha, CRNP      sodium chloride  1 g Oral TID Ida Cha, CRNP          Today, Patient Was Seen By: Alfie Potts    **Please Note: This note may have been constructed using a  voice recognition system.**

## 2023-12-16 LAB
ANION GAP SERPL CALCULATED.3IONS-SCNC: 9 MMOL/L
ATRIAL RATE: 112 BPM
BUN SERPL-MCNC: 13 MG/DL (ref 5–25)
C DIFF TOX GENS STL QL NAA+PROBE: NEGATIVE
CALCIUM SERPL-MCNC: 8.1 MG/DL (ref 8.4–10.2)
CHLORIDE SERPL-SCNC: 103 MMOL/L (ref 96–108)
CO2 SERPL-SCNC: 27 MMOL/L (ref 21–32)
CREAT SERPL-MCNC: 0.8 MG/DL (ref 0.6–1.3)
ERYTHROCYTE [DISTWIDTH] IN BLOOD BY AUTOMATED COUNT: 14.6 % (ref 11.6–15.1)
GFR SERPL CREATININE-BSD FRML MDRD: 93 ML/MIN/1.73SQ M
GLUCOSE SERPL-MCNC: 114 MG/DL (ref 65–140)
GLUCOSE SERPL-MCNC: 129 MG/DL (ref 65–140)
GLUCOSE SERPL-MCNC: 139 MG/DL (ref 65–140)
GLUCOSE SERPL-MCNC: 174 MG/DL (ref 65–140)
GLUCOSE SERPL-MCNC: 78 MG/DL (ref 65–140)
HCT VFR BLD AUTO: 30.9 % (ref 36.5–49.3)
HGB BLD-MCNC: 10.4 G/DL (ref 12–17)
MAGNESIUM SERPL-MCNC: 1.4 MG/DL (ref 1.9–2.7)
MCH RBC QN AUTO: 32.4 PG (ref 26.8–34.3)
MCHC RBC AUTO-ENTMCNC: 33.7 G/DL (ref 31.4–37.4)
MCV RBC AUTO: 96 FL (ref 82–98)
PLATELET # BLD AUTO: 131 THOUSANDS/UL (ref 149–390)
PMV BLD AUTO: 11.5 FL (ref 8.9–12.7)
POTASSIUM SERPL-SCNC: 3.6 MMOL/L (ref 3.5–5.3)
PR INTERVAL: 184 MS
QRS AXIS: 23 DEGREES
QRSD INTERVAL: 62 MS
QT INTERVAL: 336 MS
QTC INTERVAL: 458 MS
RBC # BLD AUTO: 3.21 MILLION/UL (ref 3.88–5.62)
SODIUM SERPL-SCNC: 139 MMOL/L (ref 135–147)
T WAVE AXIS: -31 DEGREES
VENTRICULAR RATE: 112 BPM
WBC # BLD AUTO: 10.24 THOUSAND/UL (ref 4.31–10.16)

## 2023-12-16 PROCEDURE — 99233 SBSQ HOSP IP/OBS HIGH 50: CPT | Performed by: STUDENT IN AN ORGANIZED HEALTH CARE EDUCATION/TRAINING PROGRAM

## 2023-12-16 PROCEDURE — 93005 ELECTROCARDIOGRAM TRACING: CPT

## 2023-12-16 PROCEDURE — 83735 ASSAY OF MAGNESIUM: CPT | Performed by: STUDENT IN AN ORGANIZED HEALTH CARE EDUCATION/TRAINING PROGRAM

## 2023-12-16 PROCEDURE — 80048 BASIC METABOLIC PNL TOTAL CA: CPT | Performed by: STUDENT IN AN ORGANIZED HEALTH CARE EDUCATION/TRAINING PROGRAM

## 2023-12-16 PROCEDURE — 92610 EVALUATE SWALLOWING FUNCTION: CPT

## 2023-12-16 PROCEDURE — 85027 COMPLETE CBC AUTOMATED: CPT | Performed by: STUDENT IN AN ORGANIZED HEALTH CARE EDUCATION/TRAINING PROGRAM

## 2023-12-16 PROCEDURE — 82948 REAGENT STRIP/BLOOD GLUCOSE: CPT

## 2023-12-16 RX ORDER — VANCOMYCIN HYDROCHLORIDE 1 G/200ML
1000 INJECTION, SOLUTION INTRAVENOUS EVERY 12 HOURS
Status: DISCONTINUED | OUTPATIENT
Start: 2023-12-16 | End: 2023-12-19

## 2023-12-16 RX ORDER — MAGNESIUM SULFATE HEPTAHYDRATE 40 MG/ML
4 INJECTION, SOLUTION INTRAVENOUS ONCE
Status: COMPLETED | OUTPATIENT
Start: 2023-12-16 | End: 2023-12-16

## 2023-12-16 RX ORDER — ALBUTEROL SULFATE 90 UG/1
2 AEROSOL, METERED RESPIRATORY (INHALATION) EVERY 4 HOURS PRN
Status: DISCONTINUED | OUTPATIENT
Start: 2023-12-16 | End: 2023-12-21 | Stop reason: HOSPADM

## 2023-12-16 RX ORDER — IPRATROPIUM BROMIDE AND ALBUTEROL SULFATE 2.5; .5 MG/3ML; MG/3ML
3 SOLUTION RESPIRATORY (INHALATION) EVERY 6 HOURS PRN
Status: DISCONTINUED | OUTPATIENT
Start: 2023-12-16 | End: 2023-12-16

## 2023-12-16 RX ADMIN — HYDROCORTISONE 15 MG: 10 TABLET ORAL at 17:17

## 2023-12-16 RX ADMIN — MIDODRINE HYDROCHLORIDE 15 MG: 5 TABLET ORAL at 17:16

## 2023-12-16 RX ADMIN — HYDROCORTISONE 20 MG: 20 TABLET ORAL at 08:32

## 2023-12-16 RX ADMIN — INSULIN GLARGINE 18 UNITS: 100 INJECTION, SOLUTION SUBCUTANEOUS at 21:51

## 2023-12-16 RX ADMIN — ASPIRIN 81 MG CHEWABLE TABLET 81 MG: 81 TABLET CHEWABLE at 08:27

## 2023-12-16 RX ADMIN — POTASSIUM CHLORIDE 20 MEQ: 750 TABLET, EXTENDED RELEASE ORAL at 17:16

## 2023-12-16 RX ADMIN — FLUDROCORTISONE ACETATE 0.2 MG: 0.1 TABLET ORAL at 08:32

## 2023-12-16 RX ADMIN — CEFTRIAXONE SODIUM 1000 MG: 10 INJECTION, POWDER, FOR SOLUTION INTRAVENOUS at 08:32

## 2023-12-16 RX ADMIN — SODIUM CHLORIDE 1 G: 1 TABLET ORAL at 21:51

## 2023-12-16 RX ADMIN — ACETAMINOPHEN 975 MG: 325 TABLET, FILM COATED ORAL at 11:44

## 2023-12-16 RX ADMIN — POTASSIUM CHLORIDE 20 MEQ: 750 TABLET, EXTENDED RELEASE ORAL at 08:27

## 2023-12-16 RX ADMIN — RISPERIDONE 1 MG: 1 TABLET ORAL at 17:16

## 2023-12-16 RX ADMIN — MAGNESIUM SULFATE HEPTAHYDRATE 4 G: 40 INJECTION, SOLUTION INTRAVENOUS at 14:35

## 2023-12-16 RX ADMIN — MIDODRINE HYDROCHLORIDE 15 MG: 5 TABLET ORAL at 11:44

## 2023-12-16 RX ADMIN — ATORVASTATIN CALCIUM 10 MG: 10 TABLET, FILM COATED ORAL at 17:16

## 2023-12-16 RX ADMIN — VITAM B12 100 MCG: 100 TAB at 08:27

## 2023-12-16 RX ADMIN — VANCOMYCIN HYDROCHLORIDE 1750 MG: 5 INJECTION, POWDER, LYOPHILIZED, FOR SOLUTION INTRAVENOUS at 11:44

## 2023-12-16 RX ADMIN — SODIUM CHLORIDE 1 G: 1 TABLET ORAL at 08:27

## 2023-12-16 RX ADMIN — MIDODRINE HYDROCHLORIDE 15 MG: 5 TABLET ORAL at 08:29

## 2023-12-16 RX ADMIN — VANCOMYCIN HYDROCHLORIDE 1000 MG: 1 INJECTION, SOLUTION INTRAVENOUS at 23:03

## 2023-12-16 RX ADMIN — APIXABAN 5 MG: 5 TABLET, FILM COATED ORAL at 17:16

## 2023-12-16 RX ADMIN — SODIUM CHLORIDE 1 G: 1 TABLET ORAL at 17:16

## 2023-12-16 RX ADMIN — FERROUS SULFATE TAB 325 MG (65 MG ELEMENTAL FE) 325 MG: 325 (65 FE) TAB at 08:27

## 2023-12-16 RX ADMIN — CALCITRIOL 0.25 MCG: 0.25 CAPSULE, LIQUID FILLED ORAL at 08:27

## 2023-12-16 RX ADMIN — APIXABAN 5 MG: 5 TABLET, FILM COATED ORAL at 08:27

## 2023-12-16 RX ADMIN — SERTRALINE HYDROCHLORIDE 100 MG: 100 TABLET ORAL at 08:27

## 2023-12-16 RX ADMIN — RISPERIDONE 1 MG: 1 TABLET ORAL at 08:27

## 2023-12-16 NOTE — ASSESSMENT & PLAN NOTE
Lab Results   Component Value Date    HGBA1C 8.8 (H) 11/11/2023      Latest Reference Range & Units 12/14/23 08:25   Glucose, Random 65 - 140 mg/dL 242 (H)   (H): Data is abnormally high    Blood Sugar Average: Last 72 hrs:  (P) [id number] regimen glargine 18 units at bedtime with lispro 6 units 3 times daily  Poor control of glucose so increase Lantus back to home dose  Accu-Cheks ordered.  Hypoglycemia protocol

## 2023-12-16 NOTE — ASSESSMENT & PLAN NOTE
Patient found to have systolic blood pressure in the 70s while in the ED.  Received 1 L IV bolus with no improvement. He was then given midodrine, hydrocortisone IV and Levophed. Pressor was wean after 1 hour with continued stable vitals  Initial lactate 2.2, now normalized to 1.4  Etiology of shock suspect multifactorial  Hypovolemic: Patient reports 4+ episodes of diarrhea for the last 3 days, dehydration  Infectious:  Patient did not meet SIRS in ED or during stay.  Suspect source GI with reported diarrhea versus respiratory.   Chronic adrenal insufficiency and ?missed home med this morning at facility  Regarding GI patient with no abdominal pain on my exam and no diarrhea while in the hospital.  Could consider CT abdomen pelvis if patient develops abdominal pain, diarrhea restarts or becomes unstable.C. difficile and stool studies ordered and pending   Even though patient has no respiratory symptoms chest x-ray reviewed by me shows a left lung opacity and with elevated procalcitonin suspect left lung PNA. Continue antibiotics as below and f/u final CXR read  UA unremarkable patient with no UTI symptoms. Less likely CNS as patient mentation is now back to baseline and no neck rigidity.  No visible cellulitis on extremities  Follow blood cultures  Obtain sputum culture and Legionella  Abx broadened to include vancomycin for fever today

## 2023-12-16 NOTE — PROGRESS NOTES
Karson You is a 68 y.o. male who is currently ordered Vancomycin IV with management by the Pharmacy Consult service.  Relevant clinical data and objective / subjective history reviewed.  Vancomycin Assessment:  Indication and Goal AUC/Trough: Pneumonia (goal -600, trough >10), -600, trough >10  Clinical Status: stable  Micro:   C. Diff neg; legionella neg; MRSA neg; blood cultures no growth at 24 hrs  Renal Function:  SCr: 0.8 mg/dL  CrCl: 88.4 mL/min  Renal replacement: Not on dialysis  Days of Therapy: 1  Current Dose: 1750 mg IV load; 1000 mg IV Q12H  Vancomycin Plan:  New Dosing:    Estimated AUC: 480 mcg*hr/mL  Estimated Trough: 15.1 mcg/mL  Next Level: 12- at 0600  Renal Function Monitoring: Daily BMP and UOP  Pharmacy will continue to follow closely for s/sx of nephrotoxicity, infusion reactions and appropriateness of therapy.  BMP and CBC will be ordered per protocol. We will continue to follow the patient’s culture results and clinical progress daily.    Isaias Bowman, Pharmacist

## 2023-12-16 NOTE — SPEECH THERAPY NOTE
Speech-Language Pathology Bedside Swallow Evaluation    Patient Name: Karson You    Today's Date: 12/16/2023     Problem List  Principal Problem:    Shock (HCC)  Active Problems:    T1DM (type 1 diabetes mellitus) (HCC)    Anemia of chronic disease    Orthostatic hypotension    BERNA (acute kidney injury) (HCC)    Adrenal insufficiency (HCC)    History of CVA (cerebrovascular accident)    Paroxysmal atrial fibrillation (HCC)    Hypokalemia    Diarrhea    Elevated troponin    Acute metabolic encephalopathy    Past Medical History  Past Medical History:   Diagnosis Date    A-fib (HCC)     Adrenal insufficiency (HCC)     Atrial fibrillation (HCC)     Diabetes mellitus (HCC)     Obesity, morbid (HCC) 11/14/2022    Stroke (HCC)        Past Surgical History  No past surgical history on file.    Summary  Pt presented with s/s suggestive of mild oral dysphagia and suspected WFL pharyngeal swallow. Symptoms or concerns included decreased mastication of solids d/t lack of upper dentition. Mastication of pretzels was significantly prolonged, with liquid wash needed to clear oral cavity. No s/s aspiration with thin liquids. Pt agreeable to dental soft diet at this time. Despite no s/s aspiration there is concern for PNA on CXR, MBS can be completed if concerns persist to r/o silent aspiration. ST will f/u.      Risk/s for Aspiration: mild    Recommended Diet: soft/level 3 diet and thin liquids   Recommended Form of Meds: whole with liquid   Aspiration precautions and swallowing strategies: upright posture, only feed when fully alert, slow rate of feeding, small bites/sips, and alternating bites and sips  Other Recommendations: Continue frequent oral care      Current Medical Status per H&P 12/14/23  Karson You is a 68 y.o. male with a PMH of adrenal insufficiency, orhtostatic hypotension, type 1 DM, paroxysmal atrial fibrillation, history of CVA and anemia who presents with fever and altered mental status at [facility].  Patient was also noted to be febrile while in EMS, Tylenol was given.  He reports having multiple episodes of loose, not watery diarrhea over the last 3 days.  He has not had diarrhea while in the hospital.  He does not recall being confused this morning.  He reports adequate intake.       Special Studies:  CXR 12/14/23 IMPRESSION:  Left lingula and lower lobe consolidation, worsened since prior exam. This findings are compatible with progression of pneumonia and suspicious for aspiration. Similar mild patchy right basilar opacities, also likely infectious/inflammatory.  CT head 12/5/23 IMPRESSION:  No acute intracranial abnormality.     Prior MBS 8/22/22 [facility] Impression:   Single episode of penetration to the cords with thin liquids administered by cup   single sip. Otherwise, no evidence of penetration or aspiration with any of the   administered consistencies.     Social/Education/Vocational Hx:  Pt lives in SNF/ECF    Swallow Information   Current Risks for Dysphagia & Aspiration:  hx of CVA  Current Symptoms/Concerns:  wet cough reported today  Current Diet: regular diet and thin liquids   Baseline Diet: regular diet and thin liquids per RN at [place]       Baseline Assessment   Behavior/Cognition: alert  Speech/Language Status: able to participate in conversation and able to follow commands  Patient Positioning: upright in bed  Pain Status/Interventions/Response to Interventions: No report of or nonverbal indications of pain.       Swallow Mechanism Exam  Facial: symmetrical  Labial: WFL  Lingual: WFL  Velum: symmetrical  Mandible: adequate ROM  Dentition: edentulous upper, lower natural  Vocal quality:clear/adequate   Volitional Cough: strong/productive   Respiratory Status: on RA     Consistencies Assessed and Performance   Consistencies Administered: thin liquids, puree, and hard solids    Oral Stage: mild and decreased mastication    Pharyngeal Stage: WFL  Swallow Mechanics: Swallowing initiation appeared  prompt. Laryngeal rise was palpated and judged to be within functional limits. No coughing, throat clearing, change in vocal quality or respiratory status noted today.     Esophageal Concerns: none reported      Summary and Recommendations (see above)    Results Reviewed with: patient, RN, and MD     Treatment Recommended: yes     Frequency of treatment: 2-3x    Dysphagia LTG  -Patient will demonstrate safe and effective oral intake (without overt s/s significant oral/pharyngeal dysphagia including s/s penetration or aspiration) for the highest appropriate diet level.     Short Term Goals:  -Pt will tolerate Dysphagia 3/advanced (dental soft) diet and thin liquid with no significant s/s oral or pharyngeal dysphagia across 1-3 diagnostic sessions.    -Patient will tolerate trials of upgraded food texture with no significant s/s of oral or pharyngeal dysphagia including aspiration across 1-3 diagnostic sessions.    -If indicated, patient will comply with a Video/Modified Barium Swallow study for more complete assessment of swallowing anatomy/physiology/aspiration risk and to assess efficacy of treatment techniques so as to best guide treatment plan.

## 2023-12-16 NOTE — RESPIRATORY THERAPY NOTE
RT Protocol Note  Karson You 68 y.o. male MRN: [id number]  [facility] Encounter: [id number]    Assessment    Principal Problem:    Shock (HCC)  Active Problems:    T1DM (type 1 diabetes mellitus) (HCC)    Anemia of chronic disease    Orthostatic hypotension    BERNA (acute kidney injury) (HCC)    Adrenal insufficiency (HCC)    History of CVA (cerebrovascular accident)    Paroxysmal atrial fibrillation (HCC)    Hypokalemia    Diarrhea    Elevated troponin    Acute metabolic encephalopathy      Home Pulmonary Medications:    Home Devices/Therapy: (P)  (none)    Past Medical History:   Diagnosis Date    A-fib (HCC)     Adrenal insufficiency (HCC)     Atrial fibrillation (HCC)     Diabetes mellitus (HCC)     Obesity, morbid (HCC) 11/14/2022    Stroke (HCC)      Social History     Socioeconomic History    Marital status: Single     Spouse name: Not on file    Number of children: Not on file    Years of education: Not on file    Highest education level: Not on file   Occupational History    Not on file   Tobacco Use    Smoking status: Former     Current packs/day: 0.25     Average packs/day: 0.3 packs/day for 30.0 years (7.5 ttl pk-yrs)     Types: Cigarettes    Smokeless tobacco: Never   Vaping Use    Vaping status: Never Used   Substance and Sexual Activity    Alcohol use: Not Currently     Alcohol/week: 5.0 standard drinks of alcohol     Types: 5 Cans of beer per week     Comment: Quit in august 2022    Drug use: Never    Sexual activity: Not Currently   Other Topics Concern    Not on file   Social History Narrative    ** Merged History Encounter **          Social Determinants of Health     Financial Resource Strain: Not on file   Food Insecurity: No Food Insecurity (12/6/2023)    Hunger Vital Sign     Worried About Running Out of Food in the Last Year: Never true     Ran Out of Food in the Last Year: Never true   Transportation Needs: No Transportation Needs (12/6/2023)    PRAPARE - Transportation     Lack  of Transportation (Medical): No     Lack of Transportation (Non-Medical): No   Physical Activity: Not on file   Stress: Not on file   Social Connections: Not on file   Intimate Partner Violence: Not on file   Housing Stability: Low Risk  (12/6/2023)    Housing Stability Vital Sign     Unable to Pay for Housing in the Last Year: No     Number of Places Lived in the Last Year: 1     Unstable Housing in the Last Year: No       Subjective         Objective    Physical Exam:   Assessment Type: (P) Assess only  General Appearance: (P) Awake, Alert  Respiratory Pattern: (P) Normal  Chest Assessment: (P) Chest expansion symmetrical  Bilateral Breath Sounds: (P) Diminished  Cough: (P) Productive  O2 Device: (P) ra    Vitals:  Blood pressure 156/82, pulse 95, temperature (!) 101.3 °F (38.5 °C), resp. rate 16, weight 83.1 kg (183 lb 4.8 oz), SpO2 (P) 94%.          Imaging and other studies: I have personally reviewed pertinent reports.      O2 Device: (P) ra     Plan    Respiratory Plan: (P) Mild Distress pathway        Resp Comments: (P) pt assesed for RCP, pt awake and denies sob at this time, BS diminshed w/ sl coarse but clear with cough, pt on RA satting 94%, takes no resp medication at home, pt had slight sob this AM per RN, will D/C Duo-neb tx and order Albuterol MDI , Q 4 prn for SOB/Wheezing, 2 puffs, pt has pos smoking hx- 0.3 ppd X 30  years, CXR-12/14/23- LLL & L lingula consolidation

## 2023-12-16 NOTE — UTILIZATION REVIEW
Date: 12/16    Day 3: Has surpassed a 2nd midnight with active treatments and services, which include tx of shock with need for IV continued IV abx and IVFs.

## 2023-12-16 NOTE — PROGRESS NOTES
[facility]  Progress Note  Name: Karson You I  MRN: [id number]  Unit/Bed#: [facility]PHP 614-01 I Date of Admission: 12/14/2023   Date of Service: 12/16/2023 I Hospital Day: 2    Assessment/Plan   Acute metabolic encephalopathy  Assessment & Plan  Patient was noted to have altered mental status at [facility], he returned to baseline in the ED  Suspect secondary to shock--hypotension, sepsis  Mentation improved, continue supportive care    Elevated troponin  Assessment & Plan  Troponin 150 we repeat 124  EKG ordered  Patient denies chest pain, shortness of breath  Suspect Typ 2 MI from hypotension/shock  No need for further trending    Diarrhea  Assessment & Plan  Patient reports more than 4 episodes of loose diarrhea for the last 3 days.  He has had no diarrhea while in the hospital  C. difficile and stool studies ordered and currently pending to be obtained.  Low suspicion of C. difficile as patient with no gastric acid suppression or recent antibiotic use also diarrhea not described as watery and has since stopped  Supportive care with IV hydration    Hypokalemia  Assessment & Plan  Potassium 3.6 today  Continue potassium supplementations twice daily  Repeat in the morning    BERNA (acute kidney injury) ([facility])  Assessment & Plan  Baseline creatinine 0.8-1, creatinine on admission 1.3  Multifactorial in the setting of shock and poor oral intake  Continue IV fluids  Repeat BMP in the morning  Avoid nephrotoxins    Orthostatic hypotension  Assessment & Plan  In the setting of adrenal insufficiency  Continue hydrocortisone and fludrocortisone  Continue midodrine 3 times daily    Anemia of chronic disease  Assessment & Plan  Hemoglobin at baseline 10-11.  Continue to monitor and transfuse if less than 7    T1DM (type 1 diabetes mellitus) ([facility])  Assessment & Plan  Lab Results   Component Value Date    HGBA1C 8.8 (H) 11/11/2023      Latest Reference Range & Units 12/14/23 08:25   Glucose, Random 65  - 140 mg/dL 242 (H)   (H): Data is abnormally high    Blood Sugar Average: Last 72 hrs:  (P) [id number] regimen glargine 18 units at bedtime with lispro 6 units 3 times daily  Poor control of glucose so increase Lantus back to home dose  Accu-Cheks ordered.  Hypoglycemia protocol      Paroxysmal atrial fibrillation (HCC)  Assessment & Plan  Not on rate control medications due to chronic hypotension  Continue Eliquis for anticoagulation      History of CVA (cerebrovascular accident)  Assessment & Plan  Continue aspirin and statin    Adrenal insufficiency (HCC)  Assessment & Plan  Continue home hydrocortisone 20mg a.m., 15 mg p.m., fludrocortisone 0.2 mg daily   Outpatient endocrinology follow-up    * Shock ([facility])  Assessment & Plan  Patient found to have systolic blood pressure in the 70s while in the ED.  Received 1 L IV bolus with no improvement. He was then given midodrine, hydrocortisone IV and Levophed. Pressor was wean after 1 hour with continued stable vitals  Initial lactate 2.2, now normalized to 1.4  Etiology of shock suspect multifactorial  Hypovolemic: Patient reports 4+ episodes of diarrhea for the last 3 days, dehydration  Infectious:  Patient did not meet SIRS in ED or during stay.  Suspect source GI with reported diarrhea versus respiratory.   Chronic adrenal insufficiency and ?missed home med this morning at facility  Regarding GI patient with no abdominal pain on my exam and no diarrhea while in the hospital.  Could consider CT abdomen pelvis if patient develops abdominal pain, diarrhea restarts or becomes unstable.C. difficile and stool studies ordered and pending   Even though patient has no respiratory symptoms chest x-ray reviewed by me shows a left lung opacity and with elevated procalcitonin suspect left lung PNA. Continue antibiotics as below and f/u final CXR read  UA unremarkable patient with no UTI symptoms. Less likely CNS as patient mentation is now back to baseline  "and no neck rigidity.  No visible cellulitis on extremities  Follow blood cultures  Obtain sputum culture and Legionella  Abx broadened to include vancomycin for fever today           VTE Pharmacologic Prophylaxis: VTE Score: 10 Moderate Risk (Score 3-4) - Pharmacological DVT Prophylaxis Ordered: apixaban (Eliquis).    Mobility:   Basic Mobility Inpatient Raw Score: 17  JH-HLM Goal: 5: Stand one or more mins  JH-HLM Achieved: 3: Sit at edge of bed  HLM Goal NOT achieved. Continue with multidisciplinary rounding and encourage appropriate mobility to improve upon HLM goals.    Patient Centered Rounds: I performed bedside rounds with nursing staff today.   Discussions with Specialists or Other Care Team Provider: N/A    Education and Discussions with Family / Patient: Updated  (daughter) via phone.    Total Time Spent on Date of Encounter in care of patient: 45 mins. This time was spent on one or more of the following: performing physical exam; counseling and coordination of care; obtaining or reviewing history; documenting in the medical record; reviewing/ordering tests, medications or procedures; communicating with other healthcare professionals and discussing with patient's family/caregivers.    Current Length of Stay: 2 day(s)  Current Patient Status: Inpatient   Certification Statement: The patient will continue to require additional inpatient hospital stay due to Fever  Discharge Plan: Anticipate discharge in 48-72 hrs to rehab facility.    Code Status: Level 1 - Full Code    Subjective:   Patient seen and examined at bedside this morning, nursing concerned at time of examination because patient appears tremulous.  I was a states he occasionally gets \"the shakes \"like this and it is not abnormal.  He feels well and offers no other complaints.  Patient was later noted to be febrile    Objective:     Vitals:   Temp (24hrs), Av °F (37.2 °C), Min:97.7 °F (36.5 °C), Max:101.3 °F (38.5 °C)    Temp:  " [97.7 °F (36.5 °C)-101.3 °F (38.5 °C)] 101 °F (38.3 °C)  HR:  [] 80  Resp:  [16-18] 16  BP: (116-190)/(53-97) 116/53  SpO2:  [94 %-99 %] 95 %  Body mass index is 31.46 kg/m².     Input and Output Summary (last 24 hours):     Intake/Output Summary (Last 24 hours) at 12/16/2023 1544  Last data filed at 12/16/2023 1440  Gross per 24 hour   Intake 770 ml   Output 1150 ml   Net -380 ml       Physical Exam:   Physical Exam  Vitals and nursing note reviewed.   Constitutional:       General: He is not in acute distress.     Appearance: He is not toxic-appearing.   HENT:      Head: Normocephalic and atraumatic.   Eyes:      Conjunctiva/sclera: Conjunctivae normal.      Pupils: Pupils are equal, round, and reactive to light.   Cardiovascular:      Rate and Rhythm: Normal rate and regular rhythm.      Pulses: Normal pulses.   Pulmonary:      Effort: Pulmonary effort is normal. No respiratory distress.      Breath sounds: Normal breath sounds. No wheezing or rales.   Abdominal:      General: Abdomen is flat. Bowel sounds are normal.      Palpations: Abdomen is soft.      Tenderness: There is no abdominal tenderness. There is no guarding.   Musculoskeletal:         General: No deformity.      Cervical back: Neck supple. No rigidity.      Right lower leg: No edema.      Left lower leg: No edema.   Skin:     General: Skin is warm and dry.      Findings: No erythema or lesion.      Comments: R sided central line present   Neurological:      General: No focal deficit present.      Mental Status: He is alert and oriented to person, place, and time.   Psychiatric:         Mood and Affect: Mood normal.         Behavior: Behavior normal.          Additional Data:     Labs:  Results from last 7 days   Lab Units 12/16/23  0947 12/15/23  0527 12/14/23  0825   WBC Thousand/uL 10.24*   < > 9.98   HEMOGLOBIN g/dL 10.4*   < > 10.5*   HEMATOCRIT % 30.9*   < > 31.8*   PLATELETS Thousands/uL 131*   < > 115*   NEUTROS PCT %  --   --  72    LYMPHS PCT %  --   --  15   MONOS PCT %  --   --  11   EOS PCT %  --   --  1    < > = values in this interval not displayed.     Results from last 7 days   Lab Units 12/16/23  0947 12/15/23  0527   SODIUM mmol/L 139 137   POTASSIUM mmol/L 3.6 3.6   CHLORIDE mmol/L 103 106   CO2 mmol/L 27 22   BUN mg/dL 13 16   CREATININE mg/dL 0.80 0.96   ANION GAP mmol/L 9 9   CALCIUM mg/dL 8.1* 7.6*   ALBUMIN g/dL  --  3.1*   TOTAL BILIRUBIN mg/dL  --  0.43   ALK PHOS U/L  --  68   ALT U/L  --  22   AST U/L  --  31   GLUCOSE RANDOM mg/dL 129 305*     Results from last 7 days   Lab Units 12/14/23  0825   INR  1.36*     Results from last 7 days   Lab Units 12/16/23  1206 12/16/23  0712 12/15/23  2101 12/15/23  1555 12/15/23  1149 12/15/23  0736 12/14/23  2128 12/14/23  1815 12/14/23  1352   POC GLUCOSE mg/dl 78 114 296* 335* 310* 285* 346* 256* 366*         Results from last 7 days   Lab Units 12/15/23  0527 12/14/23  1023 12/14/23  0825   LACTIC ACID mmol/L  --  1.4 2.2*   PROCALCITONIN ng/ml 2.95*  --  3.05*       Lines/Drains:  Invasive Devices       Peripheral Intravenous Line  Duration             Peripheral IV 12/14/23 Distal;Left;Upper;Ventral (anterior) Arm 2 days              Drain  Duration             External Urinary Catheter Large <1 day                      Telemetry:  Telemetry Orders (From admission, onward)               24 Hour Telemetry Monitoring  Continuous x 24 Hours (Telem)        Expiring   Question:  Reason for 24 Hour Telemetry  Answer:  Arrhythmias requiring acute medical intervention / PPM or ICD malfunction                     Telemetry Reviewed: Normal Sinus Rhythm  Indication for Continued Telemetry Use: No indication for continued use. Will discontinue.              Imaging: Reviewed radiology reports from this admission including: xray(s)    Recent Cultures (last 7 days):   Results from last 7 days   Lab Units 12/15/23  1119 12/14/23  2341 12/14/23  1816 12/14/23  0825 12/14/23  0824   BLOOD  CULTURE   --   --   --  No Growth at 48 hrs. No Growth at 48 hrs.   LEGIONELLA URINARY ANTIGEN   --  Negative  --   --   --    C DIFF TOXIN B BY PCR  Negative  --  Negative  --   --        Last 24 Hours Medication List:   Current Facility-Administered Medications   Medication Dose Route Frequency Provider Last Rate    acetaminophen  975 mg Oral Q6H PRN Ida Cha, CRNP      albuterol  2 puff Inhalation Q4H PRN Alfie Potts      apixaban  5 mg Oral BID Ida Cha, CRNP      aspirin  81 mg Oral Daily Ida Cha, CRNP      atorvastatin  10 mg Oral Daily With Dinner Ida Cha, CRNP      calcitriol  0.25 mcg Oral Daily Ida Cha, CRNP      cefTRIAXone  1,000 mg Intravenous Q24H Ida Cha, CRNP Stopped (12/16/23 0915)    cyanocobalamin  100 mcg Oral Daily Ida Cha, CRNP      ferrous sulfate  325 mg Oral Daily With Breakfast Ida Cha, CRNP      fludrocortisone  0.2 mg Oral Daily Ida Cha, CRNP      hydrocortisone  15 mg Oral QPM Ida Cha, CRNP      hydrocortisone  20 mg Oral QAM Ida Cha, CRNP      insulin glargine  18 Units Subcutaneous HS Alfie Potts      insulin lispro  1-5 Units Subcutaneous TID AC Ida Cha, CRNP      magnesium sulfate  4 g Intravenous Once Alfie Potts 4 g (12/16/23 1435)    midodrine  15 mg Oral TID AC Ida Cha, CRNP      multi-electrolyte  50 mL/hr Intravenous Continuous Alfie Potts 50 mL/hr (12/15/23 1435)    ondansetron  4 mg Intravenous Q6H PRN Ida Cha, CRNP      potassium chloride  20 mEq Oral BID Ida Cha, CRNP      risperiDONE  1 mg Oral BID Ida Cha, CRNP      sertraline  100 mg Oral Daily Ida Cha, CRNP      sodium chloride  1 g Oral TID Ida Cha, CRNP      vancomycin  1,000 mg Intravenous Q12H Alfie Potts          Today, Patient Was Seen By: Alfie Potts    **Please Note: This note may have been constructed using a voice recognition system.**

## 2023-12-16 NOTE — ASSESSMENT & PLAN NOTE
Patient was noted to have altered mental status at [facility], he returned to baseline in the ED  Suspect secondary to shock--hypotension, sepsis  Mentation improved, continue supportive care

## 2023-12-17 ENCOUNTER — APPOINTMENT (INPATIENT)
Dept: RADIOLOGY | Facility: HOSPITAL | Age: 68
DRG: 871 | End: 2023-12-17
Payer: COMMERCIAL

## 2023-12-17 PROBLEM — J18.9 PNEUMONIA: Status: ACTIVE | Noted: 2023-12-17

## 2023-12-17 LAB
ANION GAP SERPL CALCULATED.3IONS-SCNC: 9 MMOL/L
BUN SERPL-MCNC: 11 MG/DL (ref 5–25)
CALCIUM SERPL-MCNC: 7.9 MG/DL (ref 8.4–10.2)
CHLORIDE SERPL-SCNC: 104 MMOL/L (ref 96–108)
CO2 SERPL-SCNC: 26 MMOL/L (ref 21–32)
CREAT SERPL-MCNC: 0.85 MG/DL (ref 0.6–1.3)
ERYTHROCYTE [DISTWIDTH] IN BLOOD BY AUTOMATED COUNT: 14.5 % (ref 11.6–15.1)
FLUAV RNA RESP QL NAA+PROBE: NEGATIVE
FLUBV RNA RESP QL NAA+PROBE: NEGATIVE
GFR SERPL CREATININE-BSD FRML MDRD: 90 ML/MIN/1.73SQ M
GLUCOSE SERPL-MCNC: 101 MG/DL (ref 65–140)
GLUCOSE SERPL-MCNC: 162 MG/DL (ref 65–140)
GLUCOSE SERPL-MCNC: 229 MG/DL (ref 65–140)
GLUCOSE SERPL-MCNC: 69 MG/DL (ref 65–140)
GLUCOSE SERPL-MCNC: 97 MG/DL (ref 65–140)
HCT VFR BLD AUTO: 31.3 % (ref 36.5–49.3)
HGB BLD-MCNC: 10.7 G/DL (ref 12–17)
MAGNESIUM SERPL-MCNC: 1.6 MG/DL (ref 1.9–2.7)
MCH RBC QN AUTO: 32.7 PG (ref 26.8–34.3)
MCHC RBC AUTO-ENTMCNC: 34.2 G/DL (ref 31.4–37.4)
MCV RBC AUTO: 96 FL (ref 82–98)
PLATELET # BLD AUTO: 131 THOUSANDS/UL (ref 149–390)
PMV BLD AUTO: 10.9 FL (ref 8.9–12.7)
POTASSIUM SERPL-SCNC: 3.3 MMOL/L (ref 3.5–5.3)
RBC # BLD AUTO: 3.27 MILLION/UL (ref 3.88–5.62)
RSV RNA RESP QL NAA+PROBE: NEGATIVE
SARS-COV-2 RNA RESP QL NAA+PROBE: NEGATIVE
SODIUM SERPL-SCNC: 139 MMOL/L (ref 135–147)
WBC # BLD AUTO: 7.87 THOUSAND/UL (ref 4.31–10.16)

## 2023-12-17 PROCEDURE — 71045 X-RAY EXAM CHEST 1 VIEW: CPT

## 2023-12-17 PROCEDURE — 82948 REAGENT STRIP/BLOOD GLUCOSE: CPT

## 2023-12-17 PROCEDURE — 80048 BASIC METABOLIC PNL TOTAL CA: CPT | Performed by: STUDENT IN AN ORGANIZED HEALTH CARE EDUCATION/TRAINING PROGRAM

## 2023-12-17 PROCEDURE — 99232 SBSQ HOSP IP/OBS MODERATE 35: CPT | Performed by: STUDENT IN AN ORGANIZED HEALTH CARE EDUCATION/TRAINING PROGRAM

## 2023-12-17 PROCEDURE — 83735 ASSAY OF MAGNESIUM: CPT | Performed by: STUDENT IN AN ORGANIZED HEALTH CARE EDUCATION/TRAINING PROGRAM

## 2023-12-17 PROCEDURE — 0241U HB NFCT DS VIR RESP RNA 4 TRGT: CPT

## 2023-12-17 PROCEDURE — 85027 COMPLETE CBC AUTOMATED: CPT | Performed by: STUDENT IN AN ORGANIZED HEALTH CARE EDUCATION/TRAINING PROGRAM

## 2023-12-17 RX ORDER — FUROSEMIDE 10 MG/ML
20 INJECTION INTRAMUSCULAR; INTRAVENOUS ONCE
Status: COMPLETED | OUTPATIENT
Start: 2023-12-17 | End: 2023-12-17

## 2023-12-17 RX ORDER — FUROSEMIDE 20 MG/1
20 TABLET ORAL DAILY
Status: DISCONTINUED | OUTPATIENT
Start: 2023-12-17 | End: 2023-12-19

## 2023-12-17 RX ADMIN — VITAM B12 100 MCG: 100 TAB at 08:20

## 2023-12-17 RX ADMIN — SODIUM CHLORIDE 1 G: 1 TABLET ORAL at 21:19

## 2023-12-17 RX ADMIN — VANCOMYCIN HYDROCHLORIDE 1000 MG: 1 INJECTION, SOLUTION INTRAVENOUS at 23:50

## 2023-12-17 RX ADMIN — SODIUM CHLORIDE 1 G: 1 TABLET ORAL at 17:20

## 2023-12-17 RX ADMIN — ALBUTEROL SULFATE 2 PUFF: 90 AEROSOL, METERED RESPIRATORY (INHALATION) at 00:57

## 2023-12-17 RX ADMIN — FERROUS SULFATE TAB 325 MG (65 MG ELEMENTAL FE) 325 MG: 325 (65 FE) TAB at 08:19

## 2023-12-17 RX ADMIN — MIDODRINE HYDROCHLORIDE 15 MG: 5 TABLET ORAL at 13:00

## 2023-12-17 RX ADMIN — RISPERIDONE 1 MG: 1 TABLET ORAL at 17:20

## 2023-12-17 RX ADMIN — SODIUM CHLORIDE 1 G: 1 TABLET ORAL at 08:19

## 2023-12-17 RX ADMIN — VANCOMYCIN HYDROCHLORIDE 1000 MG: 1 INJECTION, SOLUTION INTRAVENOUS at 12:30

## 2023-12-17 RX ADMIN — ACETAMINOPHEN 975 MG: 325 TABLET, FILM COATED ORAL at 05:18

## 2023-12-17 RX ADMIN — FLUDROCORTISONE ACETATE 0.2 MG: 0.1 TABLET ORAL at 08:21

## 2023-12-17 RX ADMIN — CALCITRIOL 0.25 MCG: 0.25 CAPSULE, LIQUID FILLED ORAL at 08:20

## 2023-12-17 RX ADMIN — INSULIN GLARGINE 18 UNITS: 100 INJECTION, SOLUTION SUBCUTANEOUS at 21:19

## 2023-12-17 RX ADMIN — APIXABAN 5 MG: 5 TABLET, FILM COATED ORAL at 17:21

## 2023-12-17 RX ADMIN — INSULIN LISPRO 1 UNITS: 100 INJECTION, SOLUTION INTRAVENOUS; SUBCUTANEOUS at 18:00

## 2023-12-17 RX ADMIN — HYDROCORTISONE 20 MG: 20 TABLET ORAL at 08:24

## 2023-12-17 RX ADMIN — ASPIRIN 81 MG CHEWABLE TABLET 81 MG: 81 TABLET CHEWABLE at 08:20

## 2023-12-17 RX ADMIN — SERTRALINE HYDROCHLORIDE 100 MG: 100 TABLET ORAL at 08:20

## 2023-12-17 RX ADMIN — ATORVASTATIN CALCIUM 10 MG: 10 TABLET, FILM COATED ORAL at 17:20

## 2023-12-17 RX ADMIN — POTASSIUM CHLORIDE 20 MEQ: 750 TABLET, EXTENDED RELEASE ORAL at 08:19

## 2023-12-17 RX ADMIN — APIXABAN 5 MG: 5 TABLET, FILM COATED ORAL at 08:19

## 2023-12-17 RX ADMIN — MIDODRINE HYDROCHLORIDE 15 MG: 5 TABLET ORAL at 17:00

## 2023-12-17 RX ADMIN — FUROSEMIDE 20 MG: 20 TABLET ORAL at 13:00

## 2023-12-17 RX ADMIN — FUROSEMIDE 20 MG: 10 INJECTION, SOLUTION INTRAMUSCULAR; INTRAVENOUS at 03:22

## 2023-12-17 RX ADMIN — CEFTRIAXONE SODIUM 1000 MG: 10 INJECTION, POWDER, FOR SOLUTION INTRAVENOUS at 09:23

## 2023-12-17 RX ADMIN — INSULIN LISPRO 1 UNITS: 100 INJECTION, SOLUTION INTRAVENOUS; SUBCUTANEOUS at 12:30

## 2023-12-17 RX ADMIN — HYDROCORTISONE 15 MG: 10 TABLET ORAL at 21:19

## 2023-12-17 RX ADMIN — POTASSIUM CHLORIDE 20 MEQ: 750 TABLET, EXTENDED RELEASE ORAL at 17:20

## 2023-12-17 RX ADMIN — RISPERIDONE 1 MG: 1 TABLET ORAL at 08:20

## 2023-12-17 NOTE — QUICK NOTE
Contacted by nursing staff regarding pt's respiratory status. Nursing noted pt with new onset wet cough and marginal saturations on RA hovering around 89-90% requiring initiation of 2L nc. Personally assessed at bedside. Respirations even and unlabored on supplemental oxygen. Course lung sounds noted in the L upper and lower lung fields. CXR obtained and reviewed - appears to have increased bilateral airspace opacities ? For pulm vascular congestion. On exam, I did appreciate wet cough, and +1 edema to the lower extremities. Of note, most recent  Echo from 1/2023 showed evidence of G1DD but pEF. Will trial dose 1x dose of lasix and assess for improvement in oxygenation - titrate O2 to goal SpO2 >88%. Discussed with pt who is agreeable.

## 2023-12-17 NOTE — ASSESSMENT & PLAN NOTE
Patient reports more than 4 episodes of loose diarrhea for the last 3 days.  He has had no diarrhea while in the hospital  C. Difficile and stool studies negative

## 2023-12-17 NOTE — PROGRESS NOTES
Health system  Progress Note  Name: Karson You I  MRN: [id number]  Unit/Bed#: [facility] 614-01 I Date of Admission: 12/14/2023   Date of Service: 12/17/2023 I Hospital Day: 3    Assessment/Plan   Pneumonia  Assessment & Plan  Likely CAP on imaging versus aspiration pneumonia.  Speech consulted and we have adjusted to a suitable diet  Initially started on Rocephin/Azithromycin, adjusting abx for continued fever  Currently Rocephin/Vancomycin  Current cultures/viral panel with no growth  If continued fever would consider broadening to cefepime/vanco    Acute metabolic encephalopathy  Assessment & Plan  Patient was noted to have altered mental status at [facility], he returned to baseline in the ED  Suspect secondary to shock--hypotension, sepsis  Mentation improved, continue supportive care    Elevated troponin  Assessment & Plan  Troponin 150 we repeat 124  EKG ordered  Patient denies chest pain, shortness of breath  Suspect Typ 2 MI from hypotension/shock  No need for further trending    Diarrhea  Assessment & Plan  Patient reports more than 4 episodes of loose diarrhea for the last 3 days.  He has had no diarrhea while in the hospital  C. Difficile and stool studies negative       Hypokalemia  Assessment & Plan  Potassium 3.6 today  Continue potassium supplementations twice daily  Repeat in the morning    BERNA (acute kidney injury) ([facility])  Assessment & Plan  Baseline creatinine 0.8-1, creatinine on admission 1.3  Multifactorial in the setting of shock and poor oral intake  Continue IV fluids  Repeat BMP in the morning  Avoid nephrotoxins    Orthostatic hypotension  Assessment & Plan  In the setting of adrenal insufficiency  Continue hydrocortisone and fludrocortisone  Continue midodrine 3 times daily    Anemia of chronic disease  Assessment & Plan  Hemoglobin at baseline 10-11.  Continue to monitor and transfuse if less than 7    T1DM (type 1 diabetes mellitus) (HCC)  Assessment & Plan  Lab  Results   Component Value Date    HGBA1C 8.8 (H) 11/11/2023      Latest Reference Range & Units 12/14/23 08:25   Glucose, Random 65 - 140 mg/dL 242 (H)   (H): Data is abnormally high    Blood Sugar Average: Last 72 hrs:  (P) [id number] regimen glargine 18 units at bedtime with lispro 6 units 3 times daily  Poor control of glucose so increase Lantus back to home dose  Accu-Cheks ordered.  Hypoglycemia protocol      Paroxysmal atrial fibrillation (HCC)  Assessment & Plan  Not on rate control medications due to chronic hypotension  Continue Eliquis for anticoagulation      History of CVA (cerebrovascular accident)  Assessment & Plan  Continue aspirin and statin    Adrenal insufficiency ([facility])  Assessment & Plan  Continue home hydrocortisone 20mg a.m., 15 mg p.m., fludrocortisone 0.2 mg daily   Outpatient endocrinology follow-up    * Shock ([facility])  Assessment & Plan  Patient found to have systolic blood pressure in the 70s while in the ED.  Received 1 L IV bolus with no improvement. He was then given midodrine, hydrocortisone IV and Levophed. Pressor was wean after 1 hour with continued stable vitals  Initial lactate 2.2, now normalized to 1.4  Etiology of shock suspect multifactorial  Hypovolemic: Patient reports 4+ episodes of diarrhea for the last 3 days, dehydration  Infectious:  Patient did not meet SIRS in ED or during stay.  Suspect source GI with reported diarrhea versus respiratory.   Chronic adrenal insufficiency and ?missed home med this morning at facility  Regarding GI patient with no abdominal pain on my exam and no diarrhea while in the hospital.  Could consider CT abdomen pelvis if patient develops abdominal pain, diarrhea restarts or becomes unstable.C. difficile and stool studies ordered and pending   Even though patient has no respiratory symptoms chest x-ray reviewed by me shows a left lung opacity and with elevated procalcitonin suspect left lung PNA. Continue antibiotics as below  and f/u final CXR read  UA unremarkable patient with no UTI symptoms. Less likely CNS as patient mentation is now back to baseline and no neck rigidity.  No visible cellulitis on extremities  Follow blood cultures  Obtain sputum culture and Legionella  Abx broadened to include vancomycin for fever           VTE Pharmacologic Prophylaxis: VTE Score: 10 Moderate Risk (Score 3-4) - Pharmacological DVT Prophylaxis Ordered: apixaban (Eliquis).    Mobility:   Basic Mobility Inpatient Raw Score: 17  -HLM Goal: 5: Stand one or more mins  -HLM Achieved: 2: Bed activities/Dependent transfer  HLM Goal NOT achieved. Continue with multidisciplinary rounding and encourage appropriate mobility to improve upon HLM goals.    Patient Centered Rounds: I performed bedside rounds with nursing staff today.   Discussions with Specialists or Other Care Team Provider: N/A    Education and Discussions with Family / Patient: Updated  (daughter) at bedside.    Total Time Spent on Date of Encounter in care of patient: 45 mins. This time was spent on one or more of the following: performing physical exam; counseling and coordination of care; obtaining or reviewing history; documenting in the medical record; reviewing/ordering tests, medications or procedures; communicating with other healthcare professionals and discussing with patient's family/caregivers.    Current Length of Stay: 3 day(s)  Current Patient Status: Inpatient   Certification Statement: The patient will continue to require additional inpatient hospital stay due to Fever  Discharge Plan: Anticipate discharge in 48-72 hrs to discharge location to be determined pending rehab evaluations.    Code Status: Level 1 - Full Code    Subjective:   Patient seen and examined at bedside, he states he feels well, but patient has had continued fever today. He denies any new concerns at this time    Objective:     Vitals:   Temp (24hrs), Av.4 °F (37.4 °C), Min:98.2 °F (36.8  °C), Max:102.5 °F (39.2 °C)    Temp:  [98.2 °F (36.8 °C)-102.5 °F (39.2 °C)] 100.5 °F (38.1 °C)  HR:  [] 85  Resp:  [19-36] 19  BP: ()/(52-89) 137/64  SpO2:  [90 %-96 %] 95 %  Body mass index is 31.26 kg/m².     Input and Output Summary (last 24 hours):     Intake/Output Summary (Last 24 hours) at 12/17/2023 1615  Last data filed at 12/17/2023 1500  Gross per 24 hour   Intake 705 ml   Output 3100 ml   Net -2395 ml       Physical Exam:   Physical Exam  Vitals and nursing note reviewed.   Constitutional:       General: He is not in acute distress.     Appearance: He is not toxic-appearing.   HENT:      Head: Normocephalic and atraumatic.   Eyes:      Conjunctiva/sclera: Conjunctivae normal.      Pupils: Pupils are equal, round, and reactive to light.   Cardiovascular:      Rate and Rhythm: Normal rate and regular rhythm.      Pulses: Normal pulses.   Pulmonary:      Effort: Pulmonary effort is normal. No respiratory distress.      Breath sounds: Normal breath sounds. No wheezing or rales.   Abdominal:      General: Abdomen is flat. Bowel sounds are normal.      Palpations: Abdomen is soft.      Tenderness: There is no abdominal tenderness. There is no guarding.   Musculoskeletal:         General: No deformity.      Cervical back: Neck supple. No rigidity.      Right lower leg: No edema.      Left lower leg: No edema.   Skin:     General: Skin is warm and dry.      Findings: No erythema or lesion.      Comments: R sided central line Removed   Neurological:      General: No focal deficit present.      Mental Status: He is alert and oriented to person, place, and time.   Psychiatric:         Mood and Affect: Mood normal.         Behavior: Behavior normal.          Additional Data:     Labs:  Results from last 7 days   Lab Units 12/17/23  0514 12/15/23  0527 12/14/23  0825   WBC Thousand/uL 7.87   < > 9.98   HEMOGLOBIN g/dL 10.7*   < > 10.5*   HEMATOCRIT % 31.3*   < > 31.8*   PLATELETS Thousands/uL 131*   < >  115*   NEUTROS PCT %  --   --  72   LYMPHS PCT %  --   --  15   MONOS PCT %  --   --  11   EOS PCT %  --   --  1    < > = values in this interval not displayed.     Results from last 7 days   Lab Units 12/17/23  0514 12/16/23  0947 12/15/23  0527   SODIUM mmol/L 139   < > 137   POTASSIUM mmol/L 3.3*   < > 3.6   CHLORIDE mmol/L 104   < > 106   CO2 mmol/L 26   < > 22   BUN mg/dL 11   < > 16   CREATININE mg/dL 0.85   < > 0.96   ANION GAP mmol/L 9   < > 9   CALCIUM mg/dL 7.9*   < > 7.6*   ALBUMIN g/dL  --   --  3.1*   TOTAL BILIRUBIN mg/dL  --   --  0.43   ALK PHOS U/L  --   --  68   ALT U/L  --   --  22   AST U/L  --   --  31   GLUCOSE RANDOM mg/dL 101   < > 305*    < > = values in this interval not displayed.     Results from last 7 days   Lab Units 12/14/23  0825   INR  1.36*     Results from last 7 days   Lab Units 12/17/23  1134 12/17/23  0818 12/17/23  0741 12/16/23  2130 12/16/23  1719 12/16/23  1206 12/16/23  0712 12/15/23  2101 12/15/23  1555 12/15/23  1149 12/15/23  0736 12/14/23  2128   POC GLUCOSE mg/dl 162* 97 69 174* 139 78 114 296* 335* 310* 285* 346*         Results from last 7 days   Lab Units 12/15/23  0527 12/14/23  1023 12/14/23  0825   LACTIC ACID mmol/L  --  1.4 2.2*   PROCALCITONIN ng/ml 2.95*  --  3.05*       Lines/Drains:  Invasive Devices       Peripheral Intravenous Line  Duration             Peripheral IV 12/14/23 Distal;Left;Upper;Ventral (anterior) Arm 3 days              Drain  Duration             External Urinary Catheter Large 1 day                      Telemetry:  Telemetry Orders (From admission, onward)               24 Hour Telemetry Monitoring  Continuous x 24 Hours (Telem)        Question:  Reason for 24 Hour Telemetry  Answer:  Arrhythmias requiring acute medical intervention / PPM or ICD malfunction                     Telemetry Reviewed: Normal Sinus Rhythm  Indication for Continued Telemetry Use: No indication for continued use. Will discontinue.              Imaging: Reviewed  radiology reports from this admission including: chest xray    Recent Cultures (last 7 days):   Results from last 7 days   Lab Units 12/15/23  1119 12/14/23  2341 12/14/23  1816 12/14/23  0825 12/14/23  0824   BLOOD CULTURE   --   --   --  No Growth at 72 hrs. No Growth at 72 hrs.   LEGIONELLA URINARY ANTIGEN   --  Negative  --   --   --    C DIFF TOXIN B BY PCR  Negative  --  Negative  --   --        Last 24 Hours Medication List:   Current Facility-Administered Medications   Medication Dose Route Frequency Provider Last Rate    acetaminophen  975 mg Oral Q6H PRN Ida Cha, CRNP      albuterol  2 puff Inhalation Q4H PRN Alfie Potts      apixaban  5 mg Oral BID Ida Cha, CRNP      aspirin  81 mg Oral Daily Ida Cha, CRNP      atorvastatin  10 mg Oral Daily With Dinner Ida Cha, CRNP      calcitriol  0.25 mcg Oral Daily Ida Cha, CRNP      cefTRIAXone  1,000 mg Intravenous Q24H Ida Cha, CRNP 1,000 mg (12/17/23 0923)    cyanocobalamin  100 mcg Oral Daily Ida Cha, CRNP      ferrous sulfate  325 mg Oral Daily With Breakfast Ida Cha, CRNP      fludrocortisone  0.2 mg Oral Daily Ida Cha, CRNP      furosemide  20 mg Oral Daily Alfei Potts      hydrocortisone  15 mg Oral QPM Ida Cha, CRNP      hydrocortisone  20 mg Oral QAM Ida Cha, CRNP      insulin glargine  18 Units Subcutaneous HS Alfie Potts      insulin lispro  1-5 Units Subcutaneous TID AC Ida Cha, NACHO      midodrine  15 mg Oral TID AC Ida Cha, CRNP      ondansetron  4 mg Intravenous Q6H PRN Ida Cha, CRNP      potassium chloride  20 mEq Oral BID Ida Cha, CRNP      risperiDONE  1 mg Oral BID Ida Cha, CRNP      sertraline  100 mg Oral Daily Ida Cha, CRNP      sodium chloride  1 g Oral TID Ida Cha, CRNP      vancomycin  1,000 mg Intravenous Q12H Alfie Potts 1,000 mg (12/17/23 1230)        Today, Patient Was Seen By: Alfie Taylor  Delroy    **Please Note: This note may have been constructed using a voice recognition system.**

## 2023-12-17 NOTE — PROGRESS NOTES
Karson You is a 68 y.o. male who is currently ordered Vancomycin IV with management by the Pharmacy Consult service.  Relevant clinical data and objective / subjective history reviewed.  Vancomycin Assessment:  Indication and Goal AUC/Trough: Pneumonia (goal -600, trough >10), -600, trough >10  Clinical Status: stable  Micro:   C. Diff neg; legionella neg; MRSA neg; blood cultures no growth at 24 hrs; Covid, RSV, influenza neg  Renal Function:  SCr: 0.85 mg/dL  CrCl: 82.9 mL/min  Renal replacement: Not on dialysis  Days of Therapy: 2  Current Dose: 1000 mg IV Q12H  Vancomycin Plan:  New Dosing:    Estimated AUC: 516 mcg*hr/mL  Estimated Trough: 16.5 mcg/mL  Next Level: 12- at 0600  Renal Function Monitoring: Daily BMP and UOP  Pharmacy will continue to follow closely for s/sx of nephrotoxicity, infusion reactions and appropriateness of therapy.  BMP and CBC will be ordered per protocol. We will continue to follow the patient’s culture results and clinical progress daily.

## 2023-12-17 NOTE — ASSESSMENT & PLAN NOTE
Lab Results   Component Value Date    HGBA1C 8.8 (H) 11/11/2023      Latest Reference Range & Units 12/14/23 08:25   Glucose, Random 65 - 140 mg/dL 242 (H)   (H): Data is abnormally high    Blood Sugar Average: Last 72 hrs:  (P) [id number] regimen glargine 18 units at bedtime with lispro 6 units 3 times daily  Poor control of glucose so increase Lantus back to home dose  Accu-Cheks ordered.  Hypoglycemia protocol

## 2023-12-17 NOTE — ASSESSMENT & PLAN NOTE
Patient found to have systolic blood pressure in the 70s while in the ED.  Received 1 L IV bolus with no improvement. He was then given midodrine, hydrocortisone IV and Levophed. Pressor was wean after 1 hour with continued stable vitals  Initial lactate 2.2, now normalized to 1.4  Etiology of shock suspect multifactorial  Hypovolemic: Patient reports 4+ episodes of diarrhea for the last 3 days, dehydration  Infectious:  Patient did not meet SIRS in ED or during stay.  Suspect source GI with reported diarrhea versus respiratory.   Chronic adrenal insufficiency and ?missed home med this morning at facility  Regarding GI patient with no abdominal pain on my exam and no diarrhea while in the hospital.  Could consider CT abdomen pelvis if patient develops abdominal pain, diarrhea restarts or becomes unstable.C. difficile and stool studies ordered and pending   Even though patient has no respiratory symptoms chest x-ray reviewed by me shows a left lung opacity and with elevated procalcitonin suspect left lung PNA. Continue antibiotics as below and f/u final CXR read  UA unremarkable patient with no UTI symptoms. Less likely CNS as patient mentation is now back to baseline and no neck rigidity.  No visible cellulitis on extremities  Follow blood cultures  Obtain sputum culture and Legionella  Abx broadened to include vancomycin for fever

## 2023-12-17 NOTE — ASSESSMENT & PLAN NOTE
Likely CAP on imaging versus aspiration pneumonia.  Speech consulted and we have adjusted to a suitable diet  Initially started on Rocephin/Azithromycin, adjusting abx for continued fever  Currently Rocephin/Vancomycin  Current cultures/viral panel with no growth  If continued fever would consider broadening to cefepime/vanco

## 2023-12-18 ENCOUNTER — APPOINTMENT (INPATIENT)
Dept: RADIOLOGY | Facility: HOSPITAL | Age: 68
DRG: 871 | End: 2023-12-18
Payer: COMMERCIAL

## 2023-12-18 LAB
AMMONIA PLAS-SCNC: 34 UMOL/L (ref 18–72)
ANION GAP SERPL CALCULATED.3IONS-SCNC: 10 MMOL/L
ANION GAP SERPL CALCULATED.3IONS-SCNC: 8 MMOL/L
BASE EXCESS BLDA CALC-SCNC: 2 MMOL/L (ref -2–3)
BASOPHILS # BLD AUTO: 0.02 THOUSANDS/ÂΜL (ref 0–0.1)
BASOPHILS NFR BLD AUTO: 0 % (ref 0–1)
BUN SERPL-MCNC: 12 MG/DL (ref 5–25)
BUN SERPL-MCNC: 13 MG/DL (ref 5–25)
CA-I BLD-SCNC: 1.04 MMOL/L (ref 1.12–1.32)
CA-I BLD-SCNC: 1.12 MMOL/L (ref 1.12–1.32)
CALCIUM SERPL-MCNC: 7.5 MG/DL (ref 8.4–10.2)
CALCIUM SERPL-MCNC: 7.9 MG/DL (ref 8.4–10.2)
CHLORIDE SERPL-SCNC: 102 MMOL/L (ref 96–108)
CHLORIDE SERPL-SCNC: 103 MMOL/L (ref 96–108)
CO2 SERPL-SCNC: 25 MMOL/L (ref 21–32)
CO2 SERPL-SCNC: 28 MMOL/L (ref 21–32)
CREAT SERPL-MCNC: 0.78 MG/DL (ref 0.6–1.3)
CREAT SERPL-MCNC: 0.96 MG/DL (ref 0.6–1.3)
EOSINOPHIL # BLD AUTO: 0.04 THOUSAND/ÂΜL (ref 0–0.61)
EOSINOPHIL NFR BLD AUTO: 1 % (ref 0–6)
ERYTHROCYTE [DISTWIDTH] IN BLOOD BY AUTOMATED COUNT: 14.4 % (ref 11.6–15.1)
ERYTHROCYTE [DISTWIDTH] IN BLOOD BY AUTOMATED COUNT: 14.4 % (ref 11.6–15.1)
GFR SERPL CREATININE-BSD FRML MDRD: 82 ML/MIN/1.73SQ M
GFR SERPL CREATININE-BSD FRML MDRD: 94 ML/MIN/1.73SQ M
GLUCOSE SERPL-MCNC: 102 MG/DL (ref 65–140)
GLUCOSE SERPL-MCNC: 140 MG/DL (ref 65–140)
GLUCOSE SERPL-MCNC: 217 MG/DL (ref 65–140)
GLUCOSE SERPL-MCNC: 227 MG/DL (ref 65–140)
GLUCOSE SERPL-MCNC: 240 MG/DL (ref 65–140)
GLUCOSE SERPL-MCNC: 247 MG/DL (ref 65–140)
GLUCOSE SERPL-MCNC: 314 MG/DL (ref 65–140)
HCO3 BLDA-SCNC: 26.1 MMOL/L (ref 22–28)
HCT VFR BLD AUTO: 27.1 % (ref 36.5–49.3)
HCT VFR BLD AUTO: 28.2 % (ref 36.5–49.3)
HCT VFR BLD CALC: 26 % (ref 36.5–49.3)
HGB BLD-MCNC: 8.8 G/DL (ref 12–17)
HGB BLD-MCNC: 9.4 G/DL (ref 12–17)
HGB BLDA-MCNC: 8.8 G/DL (ref 12–17)
IMM GRANULOCYTES # BLD AUTO: 0.03 THOUSAND/UL (ref 0–0.2)
IMM GRANULOCYTES NFR BLD AUTO: 1 % (ref 0–2)
LACTATE SERPL-SCNC: 0.7 MMOL/L (ref 0.5–2)
LYMPHOCYTES # BLD AUTO: 0.84 THOUSANDS/ÂΜL (ref 0.6–4.47)
LYMPHOCYTES NFR BLD AUTO: 13 % (ref 14–44)
MAGNESIUM SERPL-MCNC: 1.5 MG/DL (ref 1.9–2.7)
MAGNESIUM SERPL-MCNC: 1.5 MG/DL (ref 1.9–2.7)
MCH RBC QN AUTO: 31.4 PG (ref 26.8–34.3)
MCH RBC QN AUTO: 32.1 PG (ref 26.8–34.3)
MCHC RBC AUTO-ENTMCNC: 32.5 G/DL (ref 31.4–37.4)
MCHC RBC AUTO-ENTMCNC: 33.3 G/DL (ref 31.4–37.4)
MCV RBC AUTO: 96 FL (ref 82–98)
MCV RBC AUTO: 97 FL (ref 82–98)
MONOCYTES # BLD AUTO: 0.72 THOUSAND/ÂΜL (ref 0.17–1.22)
MONOCYTES NFR BLD AUTO: 11 % (ref 4–12)
NEUTROPHILS # BLD AUTO: 4.73 THOUSANDS/ÂΜL (ref 1.85–7.62)
NEUTS SEG NFR BLD AUTO: 74 % (ref 43–75)
NRBC BLD AUTO-RTO: 0 /100 WBCS
PCO2 BLD: 27 MMOL/L (ref 21–32)
PCO2 BLD: 35.3 MM HG (ref 36–44)
PH BLD: 7.48 [PH] (ref 7.35–7.45)
PHOSPHATE SERPL-MCNC: 2.8 MG/DL (ref 2.3–4.1)
PLATELET # BLD AUTO: 121 THOUSANDS/UL (ref 149–390)
PLATELET # BLD AUTO: 140 THOUSANDS/UL (ref 149–390)
PMV BLD AUTO: 11.1 FL (ref 8.9–12.7)
PMV BLD AUTO: 11.5 FL (ref 8.9–12.7)
PO2 BLD: 69 MM HG (ref 75–129)
POTASSIUM BLD-SCNC: 3 MMOL/L (ref 3.5–5.3)
POTASSIUM SERPL-SCNC: 3 MMOL/L (ref 3.5–5.3)
POTASSIUM SERPL-SCNC: 3 MMOL/L (ref 3.5–5.3)
RBC # BLD AUTO: 2.8 MILLION/UL (ref 3.88–5.62)
RBC # BLD AUTO: 2.93 MILLION/UL (ref 3.88–5.62)
SAO2 % BLD FROM PO2: 95 % (ref 60–85)
SODIUM BLD-SCNC: 139 MMOL/L (ref 136–145)
SODIUM SERPL-SCNC: 137 MMOL/L (ref 135–147)
SODIUM SERPL-SCNC: 139 MMOL/L (ref 135–147)
SPECIMEN SOURCE: ABNORMAL
VANCOMYCIN TROUGH SERPL-MCNC: 20.4 UG/ML (ref 10–20)
WBC # BLD AUTO: 5.69 THOUSAND/UL (ref 4.31–10.16)
WBC # BLD AUTO: 6.38 THOUSAND/UL (ref 4.31–10.16)

## 2023-12-18 PROCEDURE — 93005 ELECTROCARDIOGRAM TRACING: CPT

## 2023-12-18 PROCEDURE — 80048 BASIC METABOLIC PNL TOTAL CA: CPT | Performed by: STUDENT IN AN ORGANIZED HEALTH CARE EDUCATION/TRAINING PROGRAM

## 2023-12-18 PROCEDURE — 83735 ASSAY OF MAGNESIUM: CPT | Performed by: STUDENT IN AN ORGANIZED HEALTH CARE EDUCATION/TRAINING PROGRAM

## 2023-12-18 PROCEDURE — 85014 HEMATOCRIT: CPT

## 2023-12-18 PROCEDURE — 85025 COMPLETE CBC W/AUTO DIFF WBC: CPT | Performed by: REGISTERED NURSE

## 2023-12-18 PROCEDURE — 84100 ASSAY OF PHOSPHORUS: CPT | Performed by: REGISTERED NURSE

## 2023-12-18 PROCEDURE — 82140 ASSAY OF AMMONIA: CPT | Performed by: PHYSICIAN ASSISTANT

## 2023-12-18 PROCEDURE — 82803 BLOOD GASES ANY COMBINATION: CPT

## 2023-12-18 PROCEDURE — 80048 BASIC METABOLIC PNL TOTAL CA: CPT | Performed by: REGISTERED NURSE

## 2023-12-18 PROCEDURE — 85027 COMPLETE CBC AUTOMATED: CPT | Performed by: STUDENT IN AN ORGANIZED HEALTH CARE EDUCATION/TRAINING PROGRAM

## 2023-12-18 PROCEDURE — 99232 SBSQ HOSP IP/OBS MODERATE 35: CPT | Performed by: STUDENT IN AN ORGANIZED HEALTH CARE EDUCATION/TRAINING PROGRAM

## 2023-12-18 PROCEDURE — 94640 AIRWAY INHALATION TREATMENT: CPT

## 2023-12-18 PROCEDURE — G1004 CDSM NDSC: HCPCS

## 2023-12-18 PROCEDURE — 83605 ASSAY OF LACTIC ACID: CPT | Performed by: REGISTERED NURSE

## 2023-12-18 PROCEDURE — 80202 ASSAY OF VANCOMYCIN: CPT | Performed by: STUDENT IN AN ORGANIZED HEALTH CARE EDUCATION/TRAINING PROGRAM

## 2023-12-18 PROCEDURE — 70450 CT HEAD/BRAIN W/O DYE: CPT

## 2023-12-18 PROCEDURE — 84132 ASSAY OF SERUM POTASSIUM: CPT

## 2023-12-18 PROCEDURE — 84295 ASSAY OF SERUM SODIUM: CPT

## 2023-12-18 PROCEDURE — 82948 REAGENT STRIP/BLOOD GLUCOSE: CPT

## 2023-12-18 PROCEDURE — 94760 N-INVAS EAR/PLS OXIMETRY 1: CPT

## 2023-12-18 PROCEDURE — 82947 ASSAY GLUCOSE BLOOD QUANT: CPT

## 2023-12-18 PROCEDURE — 82330 ASSAY OF CALCIUM: CPT

## 2023-12-18 PROCEDURE — 83735 ASSAY OF MAGNESIUM: CPT | Performed by: REGISTERED NURSE

## 2023-12-18 PROCEDURE — 36600 WITHDRAWAL OF ARTERIAL BLOOD: CPT

## 2023-12-18 PROCEDURE — 71045 X-RAY EXAM CHEST 1 VIEW: CPT

## 2023-12-18 PROCEDURE — 82330 ASSAY OF CALCIUM: CPT | Performed by: REGISTERED NURSE

## 2023-12-18 RX ORDER — ALBUTEROL SULFATE 2.5 MG/3ML
2.5 SOLUTION RESPIRATORY (INHALATION) EVERY 6 HOURS PRN
Status: DISCONTINUED | OUTPATIENT
Start: 2023-12-18 | End: 2023-12-21 | Stop reason: HOSPADM

## 2023-12-18 RX ORDER — FUROSEMIDE 10 MG/ML
20 INJECTION INTRAMUSCULAR; INTRAVENOUS ONCE
Status: COMPLETED | OUTPATIENT
Start: 2023-12-18 | End: 2023-12-18

## 2023-12-18 RX ORDER — ALBUTEROL SULFATE 2.5 MG/3ML
SOLUTION RESPIRATORY (INHALATION)
Status: COMPLETED
Start: 2023-12-18 | End: 2023-12-18

## 2023-12-18 RX ADMIN — INSULIN GLARGINE 18 UNITS: 100 INJECTION, SOLUTION SUBCUTANEOUS at 23:39

## 2023-12-18 RX ADMIN — SODIUM CHLORIDE 1 G: 1 TABLET ORAL at 09:19

## 2023-12-18 RX ADMIN — HYDROCORTISONE 20 MG: 20 TABLET ORAL at 09:21

## 2023-12-18 RX ADMIN — FUROSEMIDE 20 MG: 20 TABLET ORAL at 09:20

## 2023-12-18 RX ADMIN — ALBUTEROL SULFATE 2.5 MG: 2.5 SOLUTION RESPIRATORY (INHALATION) at 22:28

## 2023-12-18 RX ADMIN — FUROSEMIDE 20 MG: 10 INJECTION, SOLUTION INTRAMUSCULAR; INTRAVENOUS at 23:31

## 2023-12-18 RX ADMIN — CALCITRIOL 0.25 MCG: 0.25 CAPSULE, LIQUID FILLED ORAL at 09:19

## 2023-12-18 RX ADMIN — SERTRALINE HYDROCHLORIDE 100 MG: 100 TABLET ORAL at 09:19

## 2023-12-18 RX ADMIN — POTASSIUM CHLORIDE 20 MEQ: 750 TABLET, EXTENDED RELEASE ORAL at 09:20

## 2023-12-18 RX ADMIN — SODIUM CHLORIDE 1 G: 1 TABLET ORAL at 18:02

## 2023-12-18 RX ADMIN — FERROUS SULFATE TAB 325 MG (65 MG ELEMENTAL FE) 325 MG: 325 (65 FE) TAB at 09:20

## 2023-12-18 RX ADMIN — HYDROCORTISONE 15 MG: 10 TABLET ORAL at 18:03

## 2023-12-18 RX ADMIN — ASPIRIN 81 MG CHEWABLE TABLET 81 MG: 81 TABLET CHEWABLE at 09:19

## 2023-12-18 RX ADMIN — INSULIN LISPRO 1 UNITS: 100 INJECTION, SOLUTION INTRAVENOUS; SUBCUTANEOUS at 11:38

## 2023-12-18 RX ADMIN — VANCOMYCIN HYDROCHLORIDE 1000 MG: 1 INJECTION, SOLUTION INTRAVENOUS at 11:38

## 2023-12-18 RX ADMIN — INSULIN LISPRO 2 UNITS: 100 INJECTION, SOLUTION INTRAVENOUS; SUBCUTANEOUS at 18:03

## 2023-12-18 RX ADMIN — ATORVASTATIN CALCIUM 10 MG: 10 TABLET, FILM COATED ORAL at 18:02

## 2023-12-18 RX ADMIN — RISPERIDONE 1 MG: 1 TABLET ORAL at 18:02

## 2023-12-18 RX ADMIN — CEFTRIAXONE SODIUM 1000 MG: 10 INJECTION, POWDER, FOR SOLUTION INTRAVENOUS at 09:36

## 2023-12-18 RX ADMIN — VITAM B12 100 MCG: 100 TAB at 09:20

## 2023-12-18 RX ADMIN — APIXABAN 5 MG: 5 TABLET, FILM COATED ORAL at 09:20

## 2023-12-18 RX ADMIN — MIDODRINE HYDROCHLORIDE 15 MG: 5 TABLET ORAL at 18:02

## 2023-12-18 RX ADMIN — MIDODRINE HYDROCHLORIDE 15 MG: 5 TABLET ORAL at 11:38

## 2023-12-18 RX ADMIN — RISPERIDONE 1 MG: 1 TABLET ORAL at 09:19

## 2023-12-18 RX ADMIN — FLUDROCORTISONE ACETATE 0.2 MG: 0.1 TABLET ORAL at 09:21

## 2023-12-18 RX ADMIN — POTASSIUM CHLORIDE 20 MEQ: 750 TABLET, EXTENDED RELEASE ORAL at 18:02

## 2023-12-18 RX ADMIN — APIXABAN 5 MG: 5 TABLET, FILM COATED ORAL at 18:02

## 2023-12-18 RX ADMIN — MIDODRINE HYDROCHLORIDE 15 MG: 5 TABLET ORAL at 09:19

## 2023-12-18 NOTE — PROGRESS NOTES
[facility]  Progress Note  Name: Karson You I  MRN: [id number]  Unit/Bed#: [facility] 614-01 I Date of Admission: 12/14/2023   Date of Service: 12/18/2023 I Hospital Day: 4    Assessment/Plan   Pneumonia  Assessment & Plan  Likely CAP on imaging versus aspiration pneumonia.  Speech consulted and we have adjusted to a suitable diet  Initially started on Rocephin/Azithromycin, adjusting abx for continued fever  Currently Rocephin/Vancomycin  Current cultures/viral panel with no growth  If continued fever would consider broadening to cefepime/vanco  Borderline temps, question viral illness or aspiration pneumonitis. Continue to monitor    Acute metabolic encephalopathy  Assessment & Plan  Patient was noted to have altered mental status at [facility], he returned to baseline in the ED  Suspect secondary to shock--hypotension, sepsis  Mentation improved, continue supportive care    Elevated troponin  Assessment & Plan  Troponin 150 we repeat 124  EKG ordered  Patient denies chest pain, shortness of breath  Suspect Typ 2 MI from hypotension/shock  No need for further trending    Diarrhea  Assessment & Plan  Patient reports more than 4 episodes of loose diarrhea for the last 3 days.  He has had no diarrhea while in the hospital  C. Difficile and stool studies negative       Hypokalemia  Assessment & Plan  Potassium 3.6 today  Continue potassium supplementations twice daily  Repeat in the morning    BERNA (acute kidney injury) (HCC)  Assessment & Plan  Baseline creatinine 0.8-1, creatinine on admission 1.3  Multifactorial in the setting of shock and poor oral intake  Continue IV fluids  Repeat BMP in the morning  Avoid nephrotoxins    Orthostatic hypotension  Assessment & Plan  In the setting of adrenal insufficiency  Continue hydrocortisone and fludrocortisone  Continue midodrine 3 times daily    Anemia of chronic disease  Assessment & Plan  Hemoglobin at baseline 10-11.  Continue to monitor and  transfuse if less than 7    T1DM (type 1 diabetes mellitus) ([facility])  Assessment & Plan  Lab Results   Component Value Date    HGBA1C 8.8 (H) 11/11/2023      Latest Reference Range & Units 12/14/23 08:25   Glucose, Random 65 - 140 mg/dL 242 (H)   (H): Data is abnormally high    Blood Sugar Average: Last 72 hrs:  (P) [id number] regimen glargine 18 units at bedtime with lispro 6 units 3 times daily  Poor control of glucose so increase Lantus back to home dose  Accu-Cheks ordered.  Hypoglycemia protocol      Paroxysmal atrial fibrillation ([facility])  Assessment & Plan  Not on rate control medications due to chronic hypotension  Continue Eliquis for anticoagulation      History of CVA (cerebrovascular accident)  Assessment & Plan  Continue aspirin and statin    Adrenal insufficiency ([facility])  Assessment & Plan  Continue home hydrocortisone 20mg a.m., 15 mg p.m., fludrocortisone 0.2 mg daily   Outpatient endocrinology follow-up    * Shock ([facility])  Assessment & Plan  Patient found to have systolic blood pressure in the 70s while in the ED.  Received 1 L IV bolus with no improvement. He was then given midodrine, hydrocortisone IV and Levophed. Pressor was wean after 1 hour with continued stable vitals  Initial lactate 2.2, now normalized to 1.4  Even though patient has no respiratory symptoms chest x-ray reviewed by me shows a left lung opacity and with elevated procalcitonin suspect left lung PNA. Continue antibiotics as below and f/u final CXR read  UA unremarkable patient with no UTI symptoms. Less likely CNS as patient mentation is now back to baseline and no neck rigidity.  No visible cellulitis on extremities  Follow blood cultures  Obtain sputum culture and Legionella  Abx broadened to include vancomycin for fever           VTE Pharmacologic Prophylaxis: VTE Score: 10 Moderate Risk (Score 3-4) - Pharmacological DVT Prophylaxis Ordered: apixaban (Eliquis).    Mobility:   Basic Mobility Inpatient Raw Score:  17  JH-HLM Goal: 5: Stand one or more mins  JH-HLM Achieved: 3: Sit at edge of bed  HLM Goal NOT achieved. Continue with multidisciplinary rounding and encourage appropriate mobility to improve upon HLM goals.    Patient Centered Rounds: I performed bedside rounds with nursing staff today.   Discussions with Specialists or Other Care Team Provider: N/A    Education and Discussions with Family / Patient: Updated  (daughter) via phone.    Total Time Spent on Date of Encounter in care of patient: 45 mins. This time was spent on one or more of the following: performing physical exam; counseling and coordination of care; obtaining or reviewing history; documenting in the medical record; reviewing/ordering tests, medications or procedures; communicating with other healthcare professionals and discussing with patient's family/caregivers.    Current Length of Stay: 4 day(s)  Current Patient Status: Inpatient   Certification Statement: The patient will continue to require additional inpatient hospital stay due to Pneumonia  Discharge Plan: Anticipate discharge in 24-48 hrs to rehab facility.    Code Status: Level 1 - Full Code    Subjective:   Seen and examined at bedside.  Gia is resting comfortably, he states he feels better today continues to have a cough sometimes when he eats.  No other concerns    Objective:     Vitals:   Temp (24hrs), Av.7 °F (37.6 °C), Min:98.9 °F (37.2 °C), Max:100.3 °F (37.9 °C)    Temp:  [98.9 °F (37.2 °C)-100.3 °F (37.9 °C)] 99.1 °F (37.3 °C)  HR:  [76-96] 81  Resp:  [15-18] 17  BP: (131-137)/(65-81) 131/81  SpO2:  [93 %-95 %] 95 %  Body mass index is 31.03 kg/m².     Input and Output Summary (last 24 hours):     Intake/Output Summary (Last 24 hours) at 2023 1722  Last data filed at 2023 1500  Gross per 24 hour   Intake 990 ml   Output 1650 ml   Net -660 ml       Physical Exam:   Physical Exam  Vitals and nursing note reviewed.   Constitutional:       General: He is  not in acute distress.     Appearance: He is not toxic-appearing.   HENT:      Head: Normocephalic and atraumatic.   Eyes:      Conjunctiva/sclera: Conjunctivae normal.      Pupils: Pupils are equal, round, and reactive to light.   Cardiovascular:      Rate and Rhythm: Normal rate and regular rhythm.      Pulses: Normal pulses.   Pulmonary:      Effort: Pulmonary effort is normal. No respiratory distress.      Breath sounds: Normal breath sounds. No wheezing or rales.   Abdominal:      General: Abdomen is flat. Bowel sounds are normal.      Palpations: Abdomen is soft.      Tenderness: There is no abdominal tenderness. There is no guarding.   Genitourinary:     Comments: Condom cath  Musculoskeletal:         General: No deformity.      Cervical back: Neck supple. No rigidity.      Right lower leg: No edema.      Left lower leg: No edema.   Skin:     General: Skin is warm and dry.      Findings: No erythema or lesion.      Comments: R sided central line Removed   Neurological:      General: No focal deficit present.      Mental Status: He is alert and oriented to person, place, and time.   Psychiatric:         Mood and Affect: Mood normal.         Behavior: Behavior normal.          Additional Data:     Labs:  Results from last 7 days   Lab Units 12/18/23  0600 12/15/23  0527 12/14/23  0825   WBC Thousand/uL 5.69   < > 9.98   HEMOGLOBIN g/dL 8.8*   < > 10.5*   HEMATOCRIT % 27.1*   < > 31.8*   PLATELETS Thousands/uL 121*   < > 115*   NEUTROS PCT %  --   --  72   LYMPHS PCT %  --   --  15   MONOS PCT %  --   --  11   EOS PCT %  --   --  1    < > = values in this interval not displayed.     Results from last 7 days   Lab Units 12/18/23  0600 12/16/23  0947 12/15/23  0527   SODIUM mmol/L 139   < > 137   POTASSIUM mmol/L 3.0*   < > 3.6   CHLORIDE mmol/L 103   < > 106   CO2 mmol/L 28   < > 22   BUN mg/dL 12   < > 16   CREATININE mg/dL 0.78   < > 0.96   ANION GAP mmol/L 8   < > 9   CALCIUM mg/dL 7.5*   < > 7.6*   ALBUMIN  g/dL  --   --  3.1*   TOTAL BILIRUBIN mg/dL  --   --  0.43   ALK PHOS U/L  --   --  68   ALT U/L  --   --  22   AST U/L  --   --  31   GLUCOSE RANDOM mg/dL 140   < > 305*    < > = values in this interval not displayed.     Results from last 7 days   Lab Units 23  0825   INR  1.36*     Results from last 7 days   Lab Units 23  1631 23  1131 23  0814 23  2040 23  1134 23  0818 23  0741 23  2130 23  1719 23  1206 23  0712 12/15/23  2101   POC GLUCOSE mg/dl 227* 217* 102 229* 162* 97 69 174* 139 78 114 296*         Results from last 7 days   Lab Units 12/15/23  0527 23  1023 23  0825   LACTIC ACID mmol/L  --  1.4 2.2*   PROCALCITONIN ng/ml 2.95*  --  3.05*       Lines/Drains:  Invasive Devices       Peripheral Intravenous Line  Duration             Peripheral IV 23 Distal;Left;Upper;Ventral (anterior) Arm 4 days              Drain  Duration             External Urinary Catheter Large 2 days                      Telemetry:  Telemetry Orders (From admission, onward)               24 Hour Telemetry Monitoring  Continuous x 24 Hours (Telem)           Question:  Reason for 24 Hour Telemetry  Answer:  Arrhythmias requiring acute medical intervention / PPM or ICD malfunction                     Telemetry Reviewed: Normal Sinus Rhythm  Indication for Continued Telemetry Use: No indication for continued use. Will discontinue.              Imaging: Reviewed radiology reports from this admission including: chest xray    Recent Cultures (last 7 days):   Results from last 7 days   Lab Units 12/15/23  1119 23  2341 23  1816 23  0825 23  0824   BLOOD CULTURE   --   --   --  No Growth After 4 Days. No Growth After 4 Days.   LEGIONELLA URINARY ANTIGEN   --  Negative  --   --   --    C DIFF TOXIN B BY PCR  Negative  --  Negative  --   --        Last 24 Hours Medication List:   Current Facility-Administered Medications    Medication Dose Route Frequency Provider Last Rate    acetaminophen  975 mg Oral Q6H PRN Ida Cha, CRNP      albuterol  2 puff Inhalation Q4H PRN Alfie Potts      apixaban  5 mg Oral BID Ida Cha, CRNP      aspirin  81 mg Oral Daily Ida Cha, CRNP      atorvastatin  10 mg Oral Daily With Dinner Ida Cha, CRNP      calcitriol  0.25 mcg Oral Daily Ida Cha, CRNP      cefTRIAXone  1,000 mg Intravenous Q24H Ida Cha, CRNP Stopped (12/18/23 1006)    cyanocobalamin  100 mcg Oral Daily Ida Cha, CRNP      ferrous sulfate  325 mg Oral Daily With Breakfast Ida Cha, CRNP      fludrocortisone  0.2 mg Oral Daily Ida Cha, CRNP      furosemide  20 mg Oral Daily Alfie Potts      hydrocortisone  15 mg Oral QPM Ida Cha, CRNP      hydrocortisone  20 mg Oral QAM Ida Cha, CRNP      insulin glargine  18 Units Subcutaneous HS Alfie Potts      insulin lispro  1-5 Units Subcutaneous TID AC Ida Cha, CRNP      midodrine  15 mg Oral TID AC Ida Cha, CRNP      ondansetron  4 mg Intravenous Q6H PRN Ida Cha, CRNP      potassium chloride  20 mEq Oral BID Ida Cha, CRNP      risperiDONE  1 mg Oral BID Ida Cha, CRNP      sertraline  100 mg Oral Daily Ida Cha, CRNP      sodium chloride  1 g Oral TID Ida Cha, CRNP      vancomycin  1,000 mg Intravenous Q12H Alfie Potts Stopped (12/18/23 1238)        Today, Patient Was Seen By: Alfie Potts    **Please Note: This note may have been constructed using a voice recognition system.**

## 2023-12-18 NOTE — PROGRESS NOTES
Karson You is a 68 y.o. male who is currently receiving Vancomycin IV with management by the Pharmacy Consult service.  Relevant clinical data and objective / subjective history reviewed.  Vancomycin Assessment:  Indication and Goal AUC/Trough:  Pneumonia (goal -600, trough >10)     Clinical Status: stable  Micro:     Renal Function:  SCr: 0.78 mg/dL  CrCl: 89 mL/min  Renal replacement: Not on dialysis  Days of Therapy: 3  Current Dose:  1000 mg IV Q12H    Vancomycin Plan:  Dosing: continue 1000 mg q12h  Estimated AUC: 486 mcg*hr/mL  Estimated Trough: 15.2 mcg/mL  Next Level: 12/24 with AM labs  Renal Function Monitoring: Daily BMP and UOP  Pharmacy will continue to follow closely for s/sx of nephrotoxicity, infusion reactions and appropriateness of therapy.  BMP and CBC will be ordered per protocol. We will continue to follow the patient’s culture results and clinical progress daily.    Adilene Mcintyre, PharmD  Pharmacist

## 2023-12-18 NOTE — PLAN OF CARE
Problem: PAIN - ADULT  Goal: Verbalizes/displays adequate comfort level or baseline comfort level  Description: Interventions:  - Encourage patient to monitor pain and request assistance  - Assess pain using appropriate pain scale  - Administer analgesics based on type and severity of pain and evaluate response  - Implement non-pharmacological measures as appropriate and evaluate response  - Consider cultural and social influences on pain and pain management  - Notify physician/advanced practitioner if interventions unsuccessful or patient reports new pain  Outcome: Progressing     Problem: SAFETY ADULT  Goal: Patient will remain free of falls  Description: INTERVENTIONS:  - Educate patient/family on patient safety including physical limitations  - Instruct patient to call for assistance with activity   - Consult OT/PT to assist with strengthening/mobility   - Keep Call bell within reach  - Keep bed low and locked with side rails adjusted as appropriate  - Keep care items and personal belongings within reach  - Initiate and maintain comfort rounds  - Make Fall Risk Sign visible to staff  - Offer Toileting every 2 Hours, in advance of need  - Initiate/Maintain bed alarm  - Obtain necessary fall risk management equipment  - Apply yellow socks and bracelet for high fall risk patients  - Consider moving patient to room near nurses station  Outcome: Progressing     Problem: Nutrition/Hydration-ADULT  Goal: Nutrient/Hydration intake appropriate for improving, restoring or maintaining nutritional needs  Description: Monitor and assess patient's nutrition/hydration status for malnutrition. Collaborate with interdisciplinary team and initiate plan and interventions as ordered.  Monitor patient's weight and dietary intake as ordered or per policy. Utilize nutrition screening tool and intervene as necessary. Determine patient's food preferences and provide high-protein, high-caloric foods as appropriate.      INTERVENTIONS:  - Monitor oral intake, urinary output, labs, and treatment plans  - Assess nutrition and hydration status and recommend course of action  - Evaluate amount of meals eaten  - Assist patient with eating if necessary   - Allow adequate time for meals  - Recommend/ encourage appropriate diets, oral nutritional supplements, and vitamin/mineral supplements  - Order, calculate, and assess calorie counts as needed  - Recommend, monitor, and adjust tube feedings and TPN/PPN based on assessed needs  - Assess need for intravenous fluids  - Provide specific nutrition/hydration education as appropriate  - Include patient/family/caregiver in decisions related to nutrition  Outcome: Progressing

## 2023-12-18 NOTE — CASE MANAGEMENT
Case Management Discharge Planning Note    Patient name Karson You  Location [facility] 614/[facility] 614-01 MRN [id number]  : 1955 Date 2023       Current Admission Date: 2023  Current Admission Diagnosis:Shock (HCC)   Patient Active Problem List    Diagnosis Date Noted    Pneumonia 2023    Shock (HCC) 2023    Diarrhea 2023    Elevated troponin 2023    Acute metabolic encephalopathy 2023    Hypokalemia 2023    Hypomagnesemia 2023    Hypotension/syncope 11/10/2023    Fall 11/10/2023    Adrenal insufficiency (HCC) 11/10/2023    Orthostatic hypotension 11/10/2023    Type 1 diabetes mellitus with hypoglycemia ([facility]) 11/10/2023    History of CVA (cerebrovascular accident) 11/10/2023    Paroxysmal atrial fibrillation ([facility]) 11/10/2023    BERNA (acute kidney injury) ([facility]) 2023    Spells of decreased attentiveness 2023    Cerebrovascular accident (CVA) ([facility]) 2023    Iron deficiency anemia, unspecified 2023    Syncope 2023    Unspecified protein-calorie malnutrition ([facility]) 2022    Unintentional weight loss 2022    Type 2 MI (myocardial infarction) ([facility]) 2022    Orthostatic hypotension 2022    Recurrent hypoglycemia 10/19/2022    T1DM (type 1 diabetes mellitus) ([facility]) 10/19/2022    Paroxysmal atrial fibrillation  10/19/2022    History of stroke 10/19/2022    Anemia of chronic disease 10/19/2022    Psychiatric disorder 10/19/2022      LOS (days): 4  Geometric Mean LOS (GMLOS) (days): 5.1  Days to GMLOS:1.3     OBJECTIVE:  Risk of Unplanned Readmission Score: 36         Current admission status: Inpatient   Preferred Pharmacy:   [id number] - [place], [place] - [address] [place] [address]  [place] [address]  Phone: [phone] Fax: [phone]    [facility] [place] - [address]  [address]  [place] [address]  Phone: [phone] Fax:  [phone]    [id number] - [place] - [address]  [address]  [place] [address]  Phone: [phone] Fax: [phone]    Primary Care Provider: Tree Spence MD    Primary Insurance: [facility] REP  Secondary Insurance:     DISCHARGE DETAILS:                                                                                                               Facility Insurance Auth Number: [id number]

## 2023-12-18 NOTE — ASSESSMENT & PLAN NOTE
Likely CAP on imaging versus aspiration pneumonia.  Speech consulted and we have adjusted to a suitable diet  Initially started on Rocephin/Azithromycin, adjusting abx for continued fever  Currently Rocephin/Vancomycin  Current cultures/viral panel with no growth  If continued fever would consider broadening to cefepime/vanco  Borderline temps, question viral illness or aspiration pneumonitis. Continue to monitor

## 2023-12-18 NOTE — ASSESSMENT & PLAN NOTE
Patient found to have systolic blood pressure in the 70s while in the ED.  Received 1 L IV bolus with no improvement. He was then given midodrine, hydrocortisone IV and Levophed. Pressor was wean after 1 hour with continued stable vitals  Initial lactate 2.2, now normalized to 1.4  Even though patient has no respiratory symptoms chest x-ray reviewed by me shows a left lung opacity and with elevated procalcitonin suspect left lung PNA. Continue antibiotics as below and f/u final CXR read  UA unremarkable patient with no UTI symptoms. Less likely CNS as patient mentation is now back to baseline and no neck rigidity.  No visible cellulitis on extremities  Follow blood cultures  Obtain sputum culture and Legionella  Abx broadened to include vancomycin for fever

## 2023-12-18 NOTE — ASSESSMENT & PLAN NOTE
Lab Results   Component Value Date    HGBA1C 8.8 (H) 11/11/2023      Latest Reference Range & Units 12/14/23 08:25   Glucose, Random 65 - 140 mg/dL 242 (H)   (H): Data is abnormally high    Blood Sugar Average: Last 72 hrs:  (P) [id number] regimen glargine 18 units at bedtime with lispro 6 units 3 times daily  Poor control of glucose so increase Lantus back to home dose  Accu-Cheks ordered.  Hypoglycemia protocol

## 2023-12-18 NOTE — PLAN OF CARE
Problem: PAIN - ADULT  Goal: Verbalizes/displays adequate comfort level or baseline comfort level  Description: Interventions:  - Encourage patient to monitor pain and request assistance  - Assess pain using appropriate pain scale  - Administer analgesics based on type and severity of pain and evaluate response  - Implement non-pharmacological measures as appropriate and evaluate response  - Consider cultural and social influences on pain and pain management  - Notify physician/advanced practitioner if interventions unsuccessful or patient reports new pain  Outcome: Progressing     Problem: INFECTION - ADULT  Goal: Absence or prevention of progression during hospitalization  Description: INTERVENTIONS:  - Assess and monitor for signs and symptoms of infection  - Monitor lab/diagnostic results  - Monitor all insertion sites, i.e. indwelling lines, tubes, and drains  - Monitor endotracheal if appropriate and nasal secretions for changes in amount and color  - [place] appropriate cooling/warming therapies per order  - Administer medications as ordered  - Instruct and encourage patient and family to use good hand hygiene technique  - Identify and instruct in appropriate isolation precautions for identified infection/condition  Outcome: Progressing  Goal: Absence of fever/infection during neutropenic period  Description: INTERVENTIONS:  - Monitor WBC    Outcome: Progressing     Problem: SAFETY ADULT  Goal: Patient will remain free of falls  Description: INTERVENTIONS:  - Educate patient/family on patient safety including physical limitations  - Instruct patient to call for assistance with activity   - Consult OT/PT to assist with strengthening/mobility   - Keep Call bell within reach  - Keep bed low and locked with side rails adjusted as appropriate  - Keep care items and personal belongings within reach  - Initiate and maintain comfort rounds  - Make Fall Risk Sign visible to staff  - Offer Toileting every 1 Hours,  in advance of need  - Initiate/Maintain alarm  - Obtain necessary fall risk management equipment  - Apply yellow socks and bracelet for high fall risk patients  - Consider moving patient to room near nurses station  Outcome: Progressing  Goal: Maintain or return to baseline ADL function  Description: INTERVENTIONS:  -  Assess patient's ability to carry out ADLs; assess patient's baseline for ADL function and identify physical deficits which impact ability to perform ADLs (bathing, care of mouth/teeth, toileting, grooming, dressing, etc.)  - Assess/evaluate cause of self-care deficits   - Assess range of motion  - Assess patient's mobility; develop plan if impaired  - Assess patient's need for assistive devices and provide as appropriate  - Encourage maximum independence but intervene and supervise when necessary  - Involve family in performance of ADLs  - Assess for home care needs following discharge   - Consider OT consult to assist with ADL evaluation and planning for discharge  - Provide patient education as appropriate  Outcome: Progressing  Goal: Maintains/Returns to pre admission functional level  Description: INTERVENTIONS:  - Perform AM-PAC 6 Click Basic Mobility/ Daily Activity assessment daily.  - Set and communicate daily mobility goal to care team and patient/family/caregiver.   - Collaborate with rehabilitation services on mobility goals if consulted  - Perform Range of Motion 3 times a day.  - Reposition patient every 2 hours.  - Dangle patient 3 times a day  - Stand patient 3 times a day  - Ambulate patient 3 times a day  - Out of bed to chair 3 times a day   - Out of bed for meals 3 times a day  - Out of bed for toileting  - Record patient progress and toleration of activity level   Outcome: Progressing     Problem: DISCHARGE PLANNING  Goal: Discharge to home or other facility with appropriate resources  Description: INTERVENTIONS:  - Identify barriers to discharge w/patient and caregiver  - Arrange  for needed discharge resources and transportation as appropriate  - Identify discharge learning needs (meds, wound care, etc.)  - Arrange for interpretive services to assist at discharge as needed  - Refer to Case Management Department for coordinating discharge planning if the patient needs post-hospital services based on physician/advanced practitioner order or complex needs related to functional status, cognitive ability, or social support system  Outcome: Progressing     Problem: Knowledge Deficit  Goal: Patient/family/caregiver demonstrates understanding of disease process, treatment plan, medications, and discharge instructions  Description: Complete learning assessment and assess knowledge base.  Interventions:  - Provide teaching at level of understanding  - Provide teaching via preferred learning methods  Outcome: Progressing     Problem: CARDIOVASCULAR - ADULT  Goal: Maintains optimal cardiac output and hemodynamic stability  Description: INTERVENTIONS:  - Monitor I/O, vital signs and rhythm  - Monitor for S/S and trends of decreased cardiac output  - Administer and titrate ordered vasoactive medications to optimize hemodynamic stability  - Assess quality of pulses, skin color and temperature  - Assess for signs of decreased coronary artery perfusion  - Instruct patient to report change in severity of symptoms  Outcome: Progressing     Problem: GASTROINTESTINAL - ADULT  Goal: Maintains or returns to baseline bowel function  Description: INTERVENTIONS:  - Assess bowel function  - Encourage oral fluids to ensure adequate hydration  - Administer IV fluids if ordered to ensure adequate hydration  - Administer ordered medications as needed  - Encourage mobilization and activity  - Consider nutritional services referral to assist patient with adequate nutrition and appropriate food choices  Outcome: Progressing     Problem: GENITOURINARY - ADULT  Goal: Maintains or returns to baseline urinary  function  Description: INTERVENTIONS:  - Assess urinary function  - Encourage oral fluids to ensure adequate hydration if ordered  - Administer IV fluids as ordered to ensure adequate hydration  - Administer ordered medications as needed  - Offer frequent toileting  - Follow urinary retention protocol if ordered  Outcome: Progressing  Goal: Absence of urinary retention  Description: INTERVENTIONS:  - Assess patient’s ability to void and empty bladder  - Monitor I/O  - Bladder scan as needed  - Discuss with physician/AP medications to alleviate retention as needed  - Discuss catheterization for long term situations as appropriate  Outcome: Progressing     Problem: METABOLIC, FLUID AND ELECTROLYTES - ADULT  Goal: Fluid balance maintained  Description: INTERVENTIONS:  - Monitor labs   - Monitor I/O and WT  - Instruct patient on fluid and nutrition as appropriate  - Assess for signs & symptoms of volume excess or deficit  Outcome: Progressing  Goal: Glucose maintained within target range  Description: INTERVENTIONS:  - Monitor Blood Glucose as ordered  - Assess for signs and symptoms of hyperglycemia and hypoglycemia  - Administer ordered medications to maintain glucose within target range  - Assess nutritional intake and initiate nutrition service referral as needed  Outcome: Progressing     Problem: Prexisting or High Potential for Compromised Skin Integrity  Goal: Skin integrity is maintained or improved  Description: INTERVENTIONS:  - Identify patients at risk for skin breakdown  - Assess and monitor skin integrity  - Assess and monitor nutrition and hydration status  - Monitor labs   - Assess for incontinence   - Turn and reposition patient  - Assist with mobility/ambulation  - Relieve pressure over bony prominences  - Avoid friction and shearing  - Provide appropriate hygiene as needed including keeping skin clean and dry  - Evaluate need for skin moisturizer/barrier cream  - Collaborate with interdisciplinary  team   - Patient/family teaching  - Consider wound care consult   Outcome: Progressing     Problem: Nutrition/Hydration-ADULT  Goal: Nutrient/Hydration intake appropriate for improving, restoring or maintaining nutritional needs  Description: Monitor and assess patient's nutrition/hydration status for malnutrition. Collaborate with interdisciplinary team and initiate plan and interventions as ordered.  Monitor patient's weight and dietary intake as ordered or per policy. Utilize nutrition screening tool and intervene as necessary. Determine patient's food preferences and provide high-protein, high-caloric foods as appropriate.     INTERVENTIONS:  - Monitor oral intake, urinary output, labs, and treatment plans  - Assess nutrition and hydration status and recommend course of action  - Evaluate amount of meals eaten  - Assist patient with eating if necessary   - Allow adequate time for meals  - Recommend/ encourage appropriate diets, oral nutritional supplements, and vitamin/mineral supplements  - Order, calculate, and assess calorie counts as needed  - Recommend, monitor, and adjust tube feedings and TPN/PPN based on assessed needs  - Assess need for intravenous fluids  - Provide specific nutrition/hydration education as appropriate  - Include patient/family/caregiver in decisions related to nutrition  Outcome: Progressing

## 2023-12-18 NOTE — CASE MANAGEMENT
[facility] has received approved authorization from insurance:   [employer]  Called in by Rep: Jessica KIRK#  [phone]  Authorization received for: SNF  Facility: [facility]   Authorization #: [id number]  Start of Care: 12/16  Next Review Date: 12/19  Continued Stay Care Coordinator: Zara KIRK#: [phone]  Submit next review to: [phone]   Care Manager notified:  Angel Dhaliwal

## 2023-12-19 ENCOUNTER — TELEPHONE (OUTPATIENT)
Dept: CARDIOLOGY CLINIC | Facility: CLINIC | Age: 68
End: 2023-12-19

## 2023-12-19 PROBLEM — R19.7 DIARRHEA: Status: RESOLVED | Noted: 2023-12-14 | Resolved: 2023-12-19

## 2023-12-19 PROBLEM — R94.31 PROLONGED Q-T INTERVAL ON ECG: Status: ACTIVE | Noted: 2023-12-19

## 2023-12-19 PROBLEM — N17.9 AKI (ACUTE KIDNEY INJURY) (HCC): Status: RESOLVED | Noted: 2023-08-20 | Resolved: 2023-12-19

## 2023-12-19 LAB
ANION GAP SERPL CALCULATED.3IONS-SCNC: 12 MMOL/L
ATRIAL RATE: 0 BPM
ATRIAL RATE: 111 BPM
ATRIAL RATE: 241 BPM
ATRIAL RATE: 61 BPM
ATRIAL RATE: 63 BPM
ATRIAL RATE: 70 BPM
ATRIAL RATE: 77 BPM
ATRIAL RATE: 85 BPM
BACTERIA BLD CULT: NORMAL
BACTERIA BLD CULT: NORMAL
BNP SERPL-MCNC: 666 PG/ML (ref 0–100)
BUN SERPL-MCNC: 13 MG/DL (ref 5–25)
CALCIUM SERPL-MCNC: 7.7 MG/DL (ref 8.4–10.2)
CHLORIDE SERPL-SCNC: 102 MMOL/L (ref 96–108)
CO2 SERPL-SCNC: 26 MMOL/L (ref 21–32)
CREAT SERPL-MCNC: 0.9 MG/DL (ref 0.6–1.3)
ERYTHROCYTE [DISTWIDTH] IN BLOOD BY AUTOMATED COUNT: 14.3 % (ref 11.6–15.1)
GFR SERPL CREATININE-BSD FRML MDRD: 88 ML/MIN/1.73SQ M
GLUCOSE SERPL-MCNC: 162 MG/DL (ref 65–140)
GLUCOSE SERPL-MCNC: 214 MG/DL (ref 65–140)
GLUCOSE SERPL-MCNC: 229 MG/DL (ref 65–140)
GLUCOSE SERPL-MCNC: 244 MG/DL (ref 65–140)
GLUCOSE SERPL-MCNC: 247 MG/DL (ref 65–140)
GLUCOSE SERPL-MCNC: 272 MG/DL (ref 65–140)
GLUCOSE SERPL-MCNC: 417 MG/DL (ref 65–140)
GLUCOSE SERPL-MCNC: 443 MG/DL (ref 65–140)
HCT VFR BLD AUTO: 27.8 % (ref 36.5–49.3)
HGB BLD-MCNC: 9.1 G/DL (ref 12–17)
MAGNESIUM SERPL-MCNC: 1.4 MG/DL (ref 1.9–2.7)
MCH RBC QN AUTO: 31.7 PG (ref 26.8–34.3)
MCHC RBC AUTO-ENTMCNC: 32.7 G/DL (ref 31.4–37.4)
MCV RBC AUTO: 97 FL (ref 82–98)
P AXIS: 34 DEGREES
P AXIS: 40 DEGREES
PLATELET # BLD AUTO: 157 THOUSANDS/UL (ref 149–390)
PMV BLD AUTO: 11.1 FL (ref 8.9–12.7)
POTASSIUM SERPL-SCNC: 2.9 MMOL/L (ref 3.5–5.3)
PR INTERVAL: 150 MS
PR INTERVAL: 174 MS
PROCALCITONIN SERPL-MCNC: 0.39 NG/ML
QRS AXIS: 0 DEGREES
QRS AXIS: 14 DEGREES
QRS AXIS: 24 DEGREES
QRS AXIS: 30 DEGREES
QRS AXIS: 41 DEGREES
QRS AXIS: 42 DEGREES
QRS AXIS: 44 DEGREES
QRS AXIS: 51 DEGREES
QRSD INTERVAL: 0 MS
QRSD INTERVAL: 118 MS
QRSD INTERVAL: 62 MS
QRSD INTERVAL: 64 MS
QRSD INTERVAL: 66 MS
QRSD INTERVAL: 68 MS
QRSD INTERVAL: 70 MS
QRSD INTERVAL: 70 MS
QT INTERVAL: 0 MS
QT INTERVAL: 390 MS
QT INTERVAL: 402 MS
QT INTERVAL: 406 MS
QT INTERVAL: 412 MS
QT INTERVAL: 456 MS
QT INTERVAL: 460 MS
QT INTERVAL: 642 MS
QTC INTERVAL: 0 MS
QTC INTERVAL: 450 MS
QTC INTERVAL: 460 MS
QTC INTERVAL: 481 MS
QTC INTERVAL: 484 MS
QTC INTERVAL: 492 MS
QTC INTERVAL: 520 MS
QTC INTERVAL: 745 MS
RBC # BLD AUTO: 2.87 MILLION/UL (ref 3.88–5.62)
SODIUM SERPL-SCNC: 140 MMOL/L (ref 135–147)
T WAVE AXIS: -11 DEGREES
T WAVE AXIS: -44 DEGREES
T WAVE AXIS: -8 DEGREES
T WAVE AXIS: -9 DEGREES
T WAVE AXIS: 0 DEGREES
T WAVE AXIS: 247 DEGREES
T WAVE AXIS: 39 DEGREES
T WAVE AXIS: 9 DEGREES
VENTRICULAR RATE: 0 BPM
VENTRICULAR RATE: 70 BPM
VENTRICULAR RATE: 74 BPM
VENTRICULAR RATE: 77 BPM
VENTRICULAR RATE: 81 BPM
VENTRICULAR RATE: 83 BPM
VENTRICULAR RATE: 84 BPM
VENTRICULAR RATE: 86 BPM
WBC # BLD AUTO: 6.79 THOUSAND/UL (ref 4.31–10.16)

## 2023-12-19 PROCEDURE — 97530 THERAPEUTIC ACTIVITIES: CPT

## 2023-12-19 PROCEDURE — 99222 1ST HOSP IP/OBS MODERATE 55: CPT | Performed by: INTERNAL MEDICINE

## 2023-12-19 PROCEDURE — 93005 ELECTROCARDIOGRAM TRACING: CPT

## 2023-12-19 PROCEDURE — 85027 COMPLETE CBC AUTOMATED: CPT | Performed by: STUDENT IN AN ORGANIZED HEALTH CARE EDUCATION/TRAINING PROGRAM

## 2023-12-19 PROCEDURE — NC001 PR NO CHARGE: Performed by: INTERNAL MEDICINE

## 2023-12-19 PROCEDURE — 97535 SELF CARE MNGMENT TRAINING: CPT

## 2023-12-19 PROCEDURE — 97116 GAIT TRAINING THERAPY: CPT

## 2023-12-19 PROCEDURE — 94669 MECHANICAL CHEST WALL OSCILL: CPT

## 2023-12-19 PROCEDURE — 94664 DEMO&/EVAL PT USE INHALER: CPT

## 2023-12-19 PROCEDURE — 82948 REAGENT STRIP/BLOOD GLUCOSE: CPT

## 2023-12-19 PROCEDURE — 94760 N-INVAS EAR/PLS OXIMETRY 1: CPT

## 2023-12-19 PROCEDURE — 84145 PROCALCITONIN (PCT): CPT | Performed by: INTERNAL MEDICINE

## 2023-12-19 PROCEDURE — 83880 ASSAY OF NATRIURETIC PEPTIDE: CPT | Performed by: INTERNAL MEDICINE

## 2023-12-19 PROCEDURE — 80048 BASIC METABOLIC PNL TOTAL CA: CPT | Performed by: STUDENT IN AN ORGANIZED HEALTH CARE EDUCATION/TRAINING PROGRAM

## 2023-12-19 PROCEDURE — 83735 ASSAY OF MAGNESIUM: CPT | Performed by: STUDENT IN AN ORGANIZED HEALTH CARE EDUCATION/TRAINING PROGRAM

## 2023-12-19 PROCEDURE — NC001 PR NO CHARGE: Performed by: REGISTERED NURSE

## 2023-12-19 PROCEDURE — 99232 SBSQ HOSP IP/OBS MODERATE 35: CPT | Performed by: INTERNAL MEDICINE

## 2023-12-19 PROCEDURE — 93010 ELECTROCARDIOGRAM REPORT: CPT | Performed by: INTERNAL MEDICINE

## 2023-12-19 RX ORDER — HYDROCORTISONE 20 MG/1
40 TABLET ORAL EVERY MORNING
Status: COMPLETED | OUTPATIENT
Start: 2023-12-20 | End: 2023-12-21

## 2023-12-19 RX ORDER — POTASSIUM CHLORIDE 20 MEQ/1
60 TABLET, EXTENDED RELEASE ORAL ONCE
Status: COMPLETED | OUTPATIENT
Start: 2023-12-19 | End: 2023-12-19

## 2023-12-19 RX ORDER — MAGNESIUM SULFATE HEPTAHYDRATE 40 MG/ML
4 INJECTION, SOLUTION INTRAVENOUS ONCE
Status: COMPLETED | OUTPATIENT
Start: 2023-12-19 | End: 2023-12-19

## 2023-12-19 RX ORDER — INSULIN LISPRO 100 [IU]/ML
3 INJECTION, SOLUTION INTRAVENOUS; SUBCUTANEOUS
Status: DISCONTINUED | OUTPATIENT
Start: 2023-12-19 | End: 2023-12-21 | Stop reason: HOSPADM

## 2023-12-19 RX ADMIN — VANCOMYCIN HYDROCHLORIDE 1000 MG: 1 INJECTION, SOLUTION INTRAVENOUS at 00:14

## 2023-12-19 RX ADMIN — CALCITRIOL 0.25 MCG: 0.25 CAPSULE, LIQUID FILLED ORAL at 08:20

## 2023-12-19 RX ADMIN — INSULIN LISPRO 2 UNITS: 100 INJECTION, SOLUTION INTRAVENOUS; SUBCUTANEOUS at 12:29

## 2023-12-19 RX ADMIN — POTASSIUM CHLORIDE 60 MEQ: 1500 TABLET, EXTENDED RELEASE ORAL at 11:18

## 2023-12-19 RX ADMIN — HYDROCORTISONE 30 MG: 20 TABLET ORAL at 17:38

## 2023-12-19 RX ADMIN — MIDODRINE HYDROCHLORIDE 15 MG: 5 TABLET ORAL at 11:19

## 2023-12-19 RX ADMIN — INSULIN GLARGINE 18 UNITS: 100 INJECTION, SOLUTION SUBCUTANEOUS at 21:58

## 2023-12-19 RX ADMIN — INSULIN LISPRO 3 UNITS: 100 INJECTION, SOLUTION INTRAVENOUS; SUBCUTANEOUS at 17:38

## 2023-12-19 RX ADMIN — POTASSIUM CHLORIDE 20 MEQ: 750 TABLET, EXTENDED RELEASE ORAL at 17:37

## 2023-12-19 RX ADMIN — CEFTRIAXONE SODIUM 1000 MG: 10 INJECTION, POWDER, FOR SOLUTION INTRAVENOUS at 08:29

## 2023-12-19 RX ADMIN — ASPIRIN 81 MG CHEWABLE TABLET 81 MG: 81 TABLET CHEWABLE at 08:20

## 2023-12-19 RX ADMIN — INSULIN LISPRO 4 UNITS: 100 INJECTION, SOLUTION INTRAVENOUS; SUBCUTANEOUS at 06:43

## 2023-12-19 RX ADMIN — INSULIN LISPRO 4 UNITS: 100 INJECTION, SOLUTION INTRAVENOUS; SUBCUTANEOUS at 17:39

## 2023-12-19 RX ADMIN — RISPERIDONE 1 MG: 1 TABLET ORAL at 17:37

## 2023-12-19 RX ADMIN — VITAM B12 100 MCG: 100 TAB at 08:20

## 2023-12-19 RX ADMIN — SODIUM CHLORIDE 1 G: 1 TABLET ORAL at 17:37

## 2023-12-19 RX ADMIN — APIXABAN 5 MG: 5 TABLET, FILM COATED ORAL at 17:37

## 2023-12-19 RX ADMIN — RISPERIDONE 1 MG: 1 TABLET ORAL at 08:22

## 2023-12-19 RX ADMIN — APIXABAN 5 MG: 5 TABLET, FILM COATED ORAL at 08:21

## 2023-12-19 RX ADMIN — ATORVASTATIN CALCIUM 10 MG: 10 TABLET, FILM COATED ORAL at 17:37

## 2023-12-19 RX ADMIN — FLUDROCORTISONE ACETATE 0.2 MG: 0.1 TABLET ORAL at 08:23

## 2023-12-19 RX ADMIN — POTASSIUM CHLORIDE 20 MEQ: 750 TABLET, EXTENDED RELEASE ORAL at 08:20

## 2023-12-19 RX ADMIN — MIDODRINE HYDROCHLORIDE 15 MG: 5 TABLET ORAL at 08:21

## 2023-12-19 RX ADMIN — SODIUM CHLORIDE 1 G: 1 TABLET ORAL at 21:58

## 2023-12-19 RX ADMIN — SODIUM CHLORIDE 1 G: 1 TABLET ORAL at 08:21

## 2023-12-19 RX ADMIN — HYDROCORTISONE 20 MG: 20 TABLET ORAL at 08:23

## 2023-12-19 RX ADMIN — FUROSEMIDE 20 MG: 20 TABLET ORAL at 08:20

## 2023-12-19 RX ADMIN — FERROUS SULFATE TAB 325 MG (65 MG ELEMENTAL FE) 325 MG: 325 (65 FE) TAB at 08:22

## 2023-12-19 RX ADMIN — MIDODRINE HYDROCHLORIDE 15 MG: 5 TABLET ORAL at 17:36

## 2023-12-19 RX ADMIN — SERTRALINE HYDROCHLORIDE 100 MG: 100 TABLET ORAL at 08:21

## 2023-12-19 RX ADMIN — VANCOMYCIN HYDROCHLORIDE 1000 MG: 1 INJECTION, SOLUTION INTRAVENOUS at 11:11

## 2023-12-19 RX ADMIN — MAGNESIUM SULFATE HEPTAHYDRATE 4 G: 40 INJECTION, SOLUTION INTRAVENOUS at 11:08

## 2023-12-19 NOTE — ASSESSMENT & PLAN NOTE
Baseline creatinine 0.8-1, creatinine on admission 1.3  Multifactorial in the setting of shock and poor oral intake?  Initially got IVF and then got lasix IV x1, now on PO lasix   Monitor BMP

## 2023-12-19 NOTE — RAPID RESPONSE
Rapid Response Note  Karson You 68 y.o. male MRN: [id number]  Unit/Bed#: [facility] 614-01 Encounter: [id number]    Rapid Response Notification(s):   Response called date/time:  12/18/2023 10:10 PM  Response team arrival date/time:  12/18/2023 10:11 PM  Response end date/time:  12/18/2023 11:00 PM  Level of care:  [place]  Rapid response location:  [facility] unit ([facility])  Primary reason for rapid response call:  Acute change in neuro status    Rapid Response Intervention(s):   Airway:  None  Breathing:  None  Circulation:  None  Fluids administered:  None  Medications administered:  Albuterol and other (comment) (lasix)       Assessment:   AMS, increased WOB     Plan:   STAT CTH- negative for acute abnormality   STAT CXR- PNA, pulm edema   Lasix IV  Hold off on bipap at this time given improvement in WOB after albuterol   Airway clearance protocol   Istat   Labs- BMP, CBC, lytes, lactic, ammonia   Upgrade to SD2- plan reviewed with primary team at bedside      Rapid Response Outcome:   Transfer:  Transfer to stepdown 2  Primary service notified of transfer: Yes      Family notified of transfer: yes  Family member contacted: per primary  team      Background/Situation:   Karson You is a 68 y.o. male who admitted with AMS, hypotension. Had acute onset change in mental status, rapid called. When team arrived pt able to open eyes to voice and follow commands however quickly falls back to sleep. See above plan.     Review of Systems   Unable to perform ROS: Acuity of condition       Objective:   Vitals:    12/18/23 1919 12/18/23 2148 12/18/23 2207 12/18/23 2335   BP: 135/67 (!) 171/79 (!) 127/105 130/77   Pulse: 89 66 68 85   Resp:  17  20   Temp: 98.4 °F (36.9 °C) 98.9 °F (37.2 °C) 100 °F (37.8 °C)    TempSrc:       SpO2: 98% 96% 94% 94%   Weight:         Physical Exam  Vitals and nursing note reviewed.   Constitutional:       Appearance: He is ill-appearing.   HENT:      Head: Normocephalic.      Right Ear: External ear  "normal.      Left Ear: External ear normal.      Nose: Nose normal.      Mouth/Throat:      Mouth: Mucous membranes are dry.   Eyes:      Pupils: Pupils are equal, round, and reactive to light.   Cardiovascular:      Rate and Rhythm: Normal rate. Rhythm irregular.      Pulses: Normal pulses.   Pulmonary:      Breath sounds: Rhonchi and rales present.   Abdominal:      Palpations: Abdomen is soft.   Musculoskeletal:         General: Normal range of motion.      Right lower leg: Edema present.      Left lower leg: Edema present.   Skin:     General: Skin is warm.      Capillary Refill: Capillary refill takes less than 2 seconds.   Neurological:      Comments: Opens eyes to voice  Follows commands  Confused to date and situation   Oriented to self and location   Quickly falls back to sleep          Portions of the record may have been created with voice recognition software.  Occasional wrong word or \"sound a like\" substitutions may have occurred due to the inherent limitations of voice recognition software.  Read the chart carefully and recognize, using context, where substitutions have occurred.    NACHO Benedict      "

## 2023-12-19 NOTE — TELEPHONE ENCOUNTER
She needs to speak to the hospitalist taking care of the patient. Cardiology is not consulted on this patient.  I wouldn't be able to give her any meaningful information since I'm not seeing him at the hospital.  She should specifically ask to schedule a call with the attending physician taking care of him there.

## 2023-12-19 NOTE — PROGRESS NOTES
[facility]  Progress Note  Name: Karson You I  MRN: [id number]  Unit/Bed#: PPHP 614-01 I Date of Admission: 12/14/2023   Date of Service: 12/19/2023 I Hospital Day: 5    Assessment/Plan   * Shock (HCC)  Assessment & Plan  Patient found to have systolic blood pressure in the 70s while in the ED.  Received 1 L IV bolus with no improvement. He was then given midodrine, hydrocortisone IV and Levophed. Pressor was weaned after 1 hour with continued stable vitals. Per chart review has been an issue several times during prior hospitalizations in past.  Initial lactate 2.2, normalized with IVF  Even though patient has no respiratory symptoms chest x-ray was concerning for PNA and with elevated procalcitonin, treating as such below  Overall improved   Daughter requesting cardiology evaluation given negative neuro w/u while at [facility] with recurrent events     Pneumonia  Assessment & Plan  Likely CAP on imaging. Procal was elevated. Initially started on Rocephin/Azithromycin, vanco was added on 12/16 given ongoing fevers  Cultures thus far negative  Will complete 7 days of abx  Check updated procal  Repeat CXR pending   Leukocytosis resolved   Speech evaluated on 12/16, soft diet recommended (level 3 with thins)--ordered adjusted 12/19      Acute metabolic encephalopathy  Assessment & Plan  Patient was noted to have altered mental status while at SNF, he reportedly returned to baseline in the ED  Suspect multifactorial in setting of above. Patient was recently seen by neurology while at [facility] last week and recommended for outpatient EEG  Patient was RR overnight again 12/18-12/19 for AMS  CT head was unremarkable and now back to baseline  Daughter requesting inpatient cardiology evaluation     Orthostatic hypotension  Assessment & Plan  Chronic, in the setting of adrenal insufficiency however per endocrine notes at [facility] they did not feel BP fluctuations were related to this?  Continue  hydrocortisone and fludrocortisone (home doses)  Continue midodrine 3 times daily (home dose)  Cards consulted per daughter request     T1DM (type 1 diabetes mellitus) ([facility])  Assessment & Plan  Lab Results   Component Value Date    HGBA1C 8.8 (H) 11/11/2023      Latest Reference Range & Units 12/14/23 08:25   Glucose, Random 65 - 140 mg/dL 242 (H)   (H): Data is abnormally high    Blood Sugar Average: Last 72 hrs:  (P) [id number]  Home diabetic regimen of 18 units of Toujeo nightly, lispro 6 units 3 times daily before every meal   Glucose labile, will consult endocrinology for input       Adrenal insufficiency ([facility])  Assessment & Plan  Continue home hydrocortisone 20mg a.m., 15 mg p.m., fludrocortisone 0.2 mg daily   Also on salt tabs   Endocrinology consulted     BERNA (acute kidney injury) ([facility])-resolved as of 12/19/2023  Assessment & Plan  Baseline creatinine 0.8-1, creatinine on admission 1.3  Multifactorial in the setting of shock and poor oral intake?  Initially got IVF and then got lasix IV x1, now on PO lasix   Monitor BMP    Prolonged Q-T interval on ECG  Assessment & Plan  Noted on EKG during rapid. Repeat now  Telemetry  On zoloft but that should not prolong QTC  DC PRN Zofran    Elevated troponin  Assessment & Plan  Troponin 150, repeat 124 upon admission. No chest pain  Suspect Typ 2 MI from hypotension/shock    Hypokalemia  Assessment & Plan  Low, likely in setting of lasix given  Replete aggressively     Anemia of chronic disease  Assessment & Plan  Hemoglobin at baseline 10-11.  Continue to monitor and transfuse if less than 7    Paroxysmal atrial fibrillation ([facility])  Assessment & Plan  Not on rate control medications due to chronic hypotension  Continue Eliquis for anticoagulation      History of CVA (cerebrovascular accident)  Assessment & Plan  Continue aspirin and statin    Diarrhea-resolved as of 12/19/2023  Assessment & Plan  Patient reports more than 4 episodes of loose diarrhea for the  last 3 days.  He has had no diarrhea while in the hospital  C. Difficile and stool studies negative                  VTE Pharmacologic Prophylaxis: VTE Score: 10 Moderate Risk (Score 3-4) - Pharmacological DVT Prophylaxis Ordered: apixaban (Eliquis).    Mobility:   Basic Mobility Inpatient Raw Score: 17  -HLM Goal: 5: Stand one or more mins  JH-HLM Achieved: 2: Bed activities/Dependent transfer  HLM Goal NOT achieved. Continue with multidisciplinary rounding and encourage appropriate mobility to improve upon HLM goals.    Patient Centered Rounds: I performed bedside rounds with nursing staff today.   Discussions with Specialists or Other Care Team Provider: appreciate specialists    Education and Discussions with Family / Patient: Updated  (daughter) via phone.    Total Time Spent on Date of Encounter in care of patient: 40 mins. This time was spent on one or more of the following: performing physical exam; counseling and coordination of care; obtaining or reviewing history; documenting in the medical record; reviewing/ordering tests, medications or procedures; communicating with other healthcare professionals and discussing with patient's family/caregivers.    Current Length of Stay: 5 day(s)  Current Patient Status: Inpatient   Certification Statement: The patient will continue to require additional inpatient hospital stay due to ongoing issues as above  Discharge Plan: Anticipate discharge in 48-72 hrs to rehab facility.    Code Status: Level 1 - Full Code    Subjective:   Doing okay. Affect flat. No complaints. Was RR overnight due to AMS/lethargy.     Objective:     Vitals:   Temp (24hrs), Av.4 °F (36.9 °C), Min:97.2 °F (36.2 °C), Max:100 °F (37.8 °C)    Temp:  [97.2 °F (36.2 °C)-100 °F (37.8 °C)] 98 °F (36.7 °C)  HR:  [55-95] 61  Resp:  [13-28] 13  BP: (127-171)/() 143/62  SpO2:  [94 %-99 %] 97 %  Body mass index is 31.07 kg/m².     Input and Output Summary (last 24 hours):      Intake/Output Summary (Last 24 hours) at 12/19/2023 1114  Last data filed at 12/19/2023 1114  Gross per 24 hour   Intake 1490 ml   Output 3800 ml   Net -2310 ml       Physical Exam:   Physical Exam  Vitals and nursing note reviewed.   Constitutional:       General: He is not in acute distress.     Comments: On RA    Cardiovascular:      Rate and Rhythm: Normal rate.   Pulmonary:      Effort: No respiratory distress.      Comments: Coarse, dec bases   Abdominal:      General: Bowel sounds are normal. There is no distension.      Palpations: Abdomen is soft.      Tenderness: There is no abdominal tenderness.   Musculoskeletal:      Right lower leg: Edema (trace) present.      Left lower leg: Edema (trace) present.   Skin:     Coloration: Skin is pale.   Neurological:      Mental Status: He is alert.      Comments: Oriented to self, place, month. Follows commands.    Psychiatric:         Mood and Affect: Mood normal.          Additional Data:     Labs:  Results from last 7 days   Lab Units 12/19/23  0833 12/18/23  2224 12/18/23  2216   WBC Thousand/uL 6.79  --  6.38   HEMOGLOBIN g/dL 9.1*  --  9.4*   I STAT HEMOGLOBIN   --    < >  --    HEMATOCRIT % 27.8*  --  28.2*   HEMATOCRIT, ISTAT   --    < >  --    PLATELETS Thousands/uL 157  --  140*   NEUTROS PCT %  --   --  74   LYMPHS PCT %  --   --  13*   MONOS PCT %  --   --  11   EOS PCT %  --   --  1    < > = values in this interval not displayed.     Results from last 7 days   Lab Units 12/19/23  0602 12/16/23  0947 12/15/23  0527   SODIUM mmol/L 140   < > 137   POTASSIUM mmol/L 2.9*   < > 3.6   CHLORIDE mmol/L 102   < > 106   CO2 mmol/L 26   < > 22   CO2, I-STAT   --    < >  --    BUN mg/dL 13   < > 16   CREATININE mg/dL 0.90   < > 0.96   ANION GAP mmol/L 12   < > 9   CALCIUM mg/dL 7.7*   < > 7.6*   ALBUMIN g/dL  --   --  3.1*   TOTAL BILIRUBIN mg/dL  --   --  0.43   ALK PHOS U/L  --   --  68   ALT U/L  --   --  22   AST U/L  --   --  31   GLUCOSE RANDOM mg/dL 247*    < > 305*    < > = values in this interval not displayed.     Results from last 7 days   Lab Units 12/14/23  0825   INR  1.36*     Results from last 7 days   Lab Units 12/19/23  1040 12/19/23  0821 12/19/23  0632 12/18/23  2206 12/18/23  2108 12/18/23  1631 12/18/23  1131 12/18/23  0814 12/17/23  2040 12/17/23  1134 12/17/23  0818 12/17/23  0741   POC GLUCOSE mg/dl 214* 162* 443* 244* 314* 227* 217* 102 229* 162* 97 69         Results from last 7 days   Lab Units 12/18/23  2216 12/15/23  0527 12/14/23  1023 12/14/23  0825   LACTIC ACID mmol/L 0.7  --  1.4 2.2*   PROCALCITONIN ng/ml  --  2.95*  --  3.05*       Lines/Drains:  Invasive Devices       Peripheral Intravenous Line  Duration             Peripheral IV 12/14/23 Distal;Left;Upper;Ventral (anterior) Arm 5 days    Peripheral IV 12/18/23 Distal;Dorsal (posterior);Left Forearm <1 day              Drain  Duration             External Urinary Catheter Large 2 days                      Telemetry:  Telemetry Orders (From admission, onward)               24 Hour Telemetry Monitoring  Continuous x 24 Hours (Telem)        Question:  Reason for 24 Hour Telemetry  Answer:  Metabolic/electrolyte disturbance with high probability of dysrhythmia. K level <3 or >6 OR KCL infusion >10mEq/hr                     Telemetry Reviewed: Normal Sinus Rhythm  Indication for Continued Telemetry Use: Arrthymias requiring medical therapy             Imaging: Reviewed radiology reports from this admission including: [email or web address]    Recent Cultures (last 7 days):   Results from last 7 days   Lab Units 12/15/23  1119 12/14/23  2341 12/14/23  1816 12/14/23  0825 12/14/23  0824   BLOOD CULTURE   --   --   --  No Growth After 5 Days. No Growth After 5 Days.   LEGIONELLA URINARY ANTIGEN   --  Negative  --   --   --    C DIFF TOXIN B BY PCR  Negative  --  Negative  --   --        Last 24 Hours Medication List:   Current Facility-Administered Medications   Medication Dose Route Frequency Provider Last Rate     acetaminophen  975 mg Oral Q6H PRN Ida Cha, CRNP      albuterol  2 puff Inhalation Q4H PRN Alfie Potts      albuterol  2.5 mg Nebulization Q6H PRN Alfie Potts      apixaban  5 mg Oral BID Ida Cha, CRNP      aspirin  81 mg Oral Daily Ida Cha, CRNP      atorvastatin  10 mg Oral Daily With Dinner Ida Cha, CRNP      calcitriol  0.25 mcg Oral Daily Ida Cha, CRNP      cefTRIAXone  1,000 mg Intravenous Q24H Ida Cha, CRNP 1,000 mg (12/19/23 0829)    cyanocobalamin  100 mcg Oral Daily Ida Cha, CRNP      ferrous sulfate  325 mg Oral Daily With Breakfast Ida Cha, CRBENI      fludrocortisone  0.2 mg Oral Daily Ida Cha, CRBENI      furosemide  20 mg Oral Daily Alfie Potts      hydrocortisone  15 mg Oral QPM Ida Cha, CRNP      hydrocortisone  20 mg Oral QAM Ida Cha, CRNP      insulin glargine  18 Units Subcutaneous HS Alfie Potts      insulin lispro  1-5 Units Subcutaneous TID AC Ida Cha, CRNP      magnesium sulfate  4 g Intravenous Once Carmenza Leblanc PA-C 4 g (12/19/23 1108)    midodrine  15 mg Oral TID AC Ida Cha, CRNP      potassium chloride  20 mEq Oral BID Ida Cha, CRBENI      potassium chloride  60 mEq Oral Once Carmenza Leblanc PA-C      risperiDONE  1 mg Oral BID Ida Cha, CRBENI      sertraline  100 mg Oral Daily Ida Cha, CRNP      sodium chloride  1 g Oral TID Idacedric Birminghama, NACHO      vancomycin  1,000 mg Intravenous Q12H Alfie Potts 1,000 mg (12/19/23 1111)        Today, Patient Was Seen By: Carmenza Leblanc PA-C    **Please Note: This note may have been constructed using a voice recognition system.**

## 2023-12-19 NOTE — ASSESSMENT & PLAN NOTE
Chronic, in the setting of adrenal insufficiency however per endocrine notes at [facility] they did not feel BP fluctuations were related to this?  Continue hydrocortisone and fludrocortisone (home doses)  Continue midodrine 3 times daily (home dose)  Cards consulted per daughter request

## 2023-12-19 NOTE — TELEPHONE ENCOUNTER
Patient's daughter called in requesting information on patient's status in hospital as daughter feels they are not getting the answers they need about patient's health from nurses at the hospital. Daughter is requesting a call from provider when provider has time to recap what is going on with patient.

## 2023-12-19 NOTE — CONSULTS
Consultation - General Cardiology Team 2  Karson You 68 y.o. male MRN: [id number]  [facility] Encounter: [id number]          Inpatient consult to Cardiology     Date/Time  12/19/2023 5:25 PM     Performed by  Terri Chairez DO   Authorized by  Carmenza Leblanc PA-C           PCP: Tree Spence MD   Outpatient Cardiologist: Dr. Reza    History of Present Illness   Physician Requesting Consult: Leila Bruce MD  Reason for Consult / Principal Problem: Orthostatic hypotension    HPI: Karson You is a 68 y.o. year old male with a history of secondary adrenal insufficiency, orthostatic hypotension, type 1 diabetes mellitus, paroxysmal atrial fibrillation, history of CVA with and anemia who presented to the emergency department due to fever and altered mental status from [facility].  Patient was also noted to be febrile in transit and Tylenol was given.  Patient had reported multiple episodes of loose bowel movements over the last 3 days.  On arrival patient was found to have systolic blood pressure in the 70s while in the ER and received 1 L IV fluid bolus with no improvement.  He was given his midodrine, IV hydrocortisone and Levophed.  Pressors were able to be weaned after an hour and vitals remained stable.  His initial lactate was 2.2 and normalized to 1.4.  Etiology of his shock was thought to be multifactorial in the setting of hypovolemia, sepsis and chronic adrenal insufficiency.  On chest x-ray patient was found to have pneumonia and was started on Rocephin/azithromycin and vancomycin was added on 12/16/2023 given ongoing fevers.  Patient did have episode of acute metabolic encephalopathy and had CT head which was unremarkable.  He has known chronic orthostatic hypotension and is on hydrocortisone, fludrocortisone and midodrine at home.  He also has known secondary adrenal insufficiency and is on hydrocortisone, 20 mg in the a.m., 50 mg p.m. fludrocortisone 0.2 mg daily as well as salt  tabs.    Patient was examined at the bedside this afternoon and he states that he is feeling well.  He is tired but otherwise denies chest pain, palpitations, shortness of breath, nausea, vomiting, diarrhea.  States he has not passed out again while.  He was able to get from the bed to the chair today without feeling lightheaded or dizzy.      Review of Systems   Constitutional:  Positive for fatigue. Negative for chills and fever.   HENT:  Negative for ear pain and sore throat.    Eyes:  Negative for pain and visual disturbance.   Respiratory:  Negative for cough and shortness of breath.    Cardiovascular:  Negative for chest pain and palpitations.   Gastrointestinal:  Negative for abdominal pain and vomiting.   Genitourinary:  Negative for dysuria and hematuria.   Musculoskeletal:  Negative for arthralgias and back pain.   Skin:  Negative for color change and rash.   Neurological:  Negative for seizures and syncope.   All other systems reviewed and are negative.          Historical Information   Past Medical History:   Diagnosis Date    A-fib (HCC)     Adrenal insufficiency (HCC)     Atrial fibrillation (HCC)     Diabetes mellitus (HCC)     Obesity, morbid (HCC) 11/14/2022    Stroke (HCC)      No past surgical history on file.  Social History     Substance and Sexual Activity   Alcohol Use Not Currently    Alcohol/week: 5.0 standard drinks of alcohol    Types: 5 Cans of beer per week    Comment: Quit in august 2022     Social History     Substance and Sexual Activity   Drug Use Never     Social History     Tobacco Use   Smoking Status Former    Current packs/day: 0.25    Average packs/day: 0.3 packs/day for 30.0 years (7.5 ttl pk-yrs)    Types: Cigarettes   Smokeless Tobacco Never     Family History:   Family History   Problem Relation Age of Onset    Heart disease Mother     Cancer Father     Multiple sclerosis Sister        Meds/Allergies   Hospital Medications:   Current Facility-Administered Medications    Medication Dose Route Frequency    acetaminophen (TYLENOL) tablet 975 mg  975 mg Oral Q6H PRN    albuterol (PROVENTIL HFA,VENTOLIN HFA) inhaler 2 puff  2 puff Inhalation Q4H PRN    albuterol inhalation solution 2.5 mg  2.5 mg Nebulization Q6H PRN    apixaban (ELIQUIS) tablet 5 mg  5 mg Oral BID    aspirin chewable tablet 81 mg  81 mg Oral Daily    atorvastatin (LIPITOR) tablet 10 mg  10 mg Oral Daily With Dinner    calcitriol (ROCALTROL) capsule 0.25 mcg  0.25 mcg Oral Daily    cyanocobalamin (VITAMIN B-12) tablet 100 mcg  100 mcg Oral Daily    ferrous sulfate tablet 325 mg  325 mg Oral Daily With Breakfast    fludrocortisone (FLORINEF) tablet 0.2 mg  0.2 mg Oral Daily    furosemide (LASIX) tablet 20 mg  20 mg Oral Daily    hydrocortisone (CORTEF) tablet 30 mg  30 mg Oral QPM    [START ON 12/20/2023] hydrocortisone (CORTEF) tablet 40 mg  40 mg Oral QAM    insulin glargine (LANTUS) subcutaneous injection 18 Units 0.18 mL  18 Units Subcutaneous HS    insulin lispro (HumaLOG) 100 units/mL subcutaneous injection 1-5 Units  1-5 Units Subcutaneous TID AC    insulin lispro (HumaLOG) 100 units/mL subcutaneous injection 3 Units  3 Units Subcutaneous TID With Meals    midodrine (PROAMATINE) tablet 15 mg  15 mg Oral TID AC    potassium chloride (K-DUR,KLOR-CON) CR tablet 20 mEq  20 mEq Oral BID    risperiDONE (RisperDAL) tablet 1 mg  1 mg Oral BID    sertraline (ZOLOFT) tablet 100 mg  100 mg Oral Daily    sodium chloride tablet 1 g  1 g Oral TID     Home Medications:   Medications Prior to Admission   Medication    apixaban (Eliquis) 5 mg    aspirin 81 mg chewable tablet    atorvastatin (LIPITOR) 10 mg tablet    calcitriol (ROCALTROL) 0.25 mcg capsule    Continuous Blood Gluc  (Dexcom G7 ) CYN    Continuous Blood Gluc Sensor (Dexcom G7 Sensor)    cyanocobalamin (VITAMIN B-12) 100 MCG tablet    ferrous sulfate 324 (65 Fe) mg    fludrocortisone (FLORINEF) 0.1 mg tablet    hydrocortisone (CORTEF) 5 mg tablet     insulin glargine (Toujeo Max SoloStar) 300 units/mL CONCENTRATED U-300 injection pen (2-unit dial)    insulin lispro (HumaLOG KwikPen) 100 units/mL injection pen    Insulin Pen Needle (BD Pen Needle Josie 2nd Gen) 32G X 4 MM MISC    Insulin Pen Needle (BD Pen Needle Josie 2nd Gen) 32G X 4 MM MISC    magnesium Oxide (MAG-OX) 400 mg TABS    midodrine (PROAMATINE) 10 MG tablet    polyethylene glycol (GOLYTELY) 4000 mL solution    potassium chloride (Klor-Con) 10 mEq tablet    risperiDONE (RisperDAL) 1 mg tablet    sertraline (ZOLOFT) 100 mg tablet    sodium chloride 1 g tablet       Allergies   Allergen Reactions    Imipramine Other (See Comments)    Lisinopril Other (See Comments)    Paroxetine Other (See Comments)    Penicillins Hives    Rosiglitazone Other (See Comments)    Troglitazone Other (See Comments)       Objective   Vitals: Blood pressure 139/62, pulse 67, temperature 98.4 °F (36.9 °C), resp. rate 16, weight 82.1 kg (181 lb), SpO2 98%.  Orthostatic Blood Pressures      Flowsheet Row Most Recent Value   Blood Pressure 139/62 filed at 12/19/2023 1538   Patient Position - Orthostatic VS Sitting filed at 12/19/2023 1042              Invasive Devices       Peripheral Intravenous Line  Duration             Peripheral IV 12/14/23 Distal;Left;Upper;Ventral (anterior) Arm 5 days    Peripheral IV 12/18/23 Distal;Dorsal (posterior);Left Forearm <1 day              Drain  Duration             External Urinary Catheter Large 3 days                    Physical Exam    GEN: Karson You is a chronically ill-appearing gentleman, alert and oriented x 3, pleasant and cooperative   HEENT:  Normocephalic, atraumatic, anicteric, moist mucous membranes  NECK: No JVD or carotid bruits   HEART: regular rhythm, regular rate, normal S1 and S2, no murmurs, clicks, gallops or rubs   LUNGS: Coarse breath sounds throughout.  ABDOMEN:  Normoactive bowel sounds, soft, no tenderness, no distention  EXTREMITIES: peripheral pulses palpable;  trace lower extremity edema.  NEURO: no gross focal findings; cranial nerves grossly intact   SKIN:  Dry, intact, warm to touch    Lab Results: I have personally reviewed pertinent lab results.    Coags:   Results from last 7 days   Lab Units 12/14/23  0825   PTT seconds 35   INR  1.36*     Magnesium:   Results from last 7 days   Lab Units 12/19/23  0602   MAGNESIUM mg/dL 1.4*     Lipid Profile:         Results from last 7 days   Lab Units 12/19/23  0602 12/18/23  2224 12/18/23  2216 12/18/23  0600   POTASSIUM mmol/L 2.9*  --  3.0* 3.0*   CO2 mmol/L 26  --  25 28   CO2, I-STAT mmol/L  --  27  --   --    CHLORIDE mmol/L 102  --  102 103   BUN mg/dL 13  --  13 12   CREATININE mg/dL 0.90  --  0.96 0.78     Results from last 7 days   Lab Units 12/19/23  0833 12/18/23  2224 12/18/23 2216 12/18/23  0600   HEMOGLOBIN g/dL 9.1*  --  9.4* 8.8*   I STAT HEMOGLOBIN g/dl  --  8.8*  --   --    HEMATOCRIT % 27.8*  --  28.2* 27.1*   HEMATOCRIT, ISTAT %  --  26*  --   --    PLATELETS Thousands/uL 157  --  140* 121*     Results from last 7 days   Lab Units 12/14/23  0825   PTT seconds 35             Imaging: I have personally reviewed pertinent reports.   and I have personally reviewed pertinent films in PACS    EKG:   Date: 12/19/2023  Interpretation: Sinus rhythm with PACs, QTc of 492, significant baseline artifact      Previous STRESS TEST:  No results found for this or any previous visit.     No results found for this or any previous visit.    No results found for this or any previous visit.      Previous Cath/PCI:  No results found for this or any previous visit.      ECHO:  Transthoracic echo 12/5/2023      Interpretation Summary         Left Ventricle: Left ventricular cavity size is normal. Wall thickness is normal. The left ventricular ejection fraction is 60%. Systolic function is normal. Wall motion is normal. Diastolic function is normal for age.    Right Ventricle: Right ventricular cavity size is normal. Systolic  function is normal.    Aortic Valve: There is trace regurgitation.     Findings    Left Ventricle Left ventricular cavity size is normal. Wall thickness is normal. The left ventricular ejection fraction is 60%. Systolic function is normal. Wall motion is normal. Diastolic function is normal for age.   Right Ventricle Right ventricular cavity size is normal. Systolic function is normal.   Left Atrium The atrium is normal in size.   Right Atrium The atrium is normal in size.   Aortic Valve The aortic valve is trileaflet. The leaflets are not thickened. The leaflets are not calcified. The leaflets exhibit normal mobility. There is trace regurgitation. The aortic valve has no significant stenosis.   Mitral Valve There is no evidence of regurgitation. There is no evidence of stenosis. The mitral valve has normal structure and normal function.   Tricuspid Valve Tricuspid valve structure is normal. There is trace regurgitation. There is no evidence of stenosis. Right ventricular systolic pressure could not be assessed.   Pulmonic Valve Pulmonic valve structure is normal. There is no evidence of regurgitation. There is no evidence of stenosis.   Ascending Aorta The aortic root is normal in size.   IVC/SVC The inferior vena cava is not well visualized.   Pericardium There is no pericardial effusion.     Left Ventricle Measurements    Function/Volumes   A4C EF 64 %         Dimensions   LVIDd 4.4 cm         LVIDS 3.1 cm         IVSd 1.1 cm         LVPWd 1.2 cm         FS 30         Diastolic Filling   MV E' Tissue Velocity Septal 7 cm/s         LA Volume Index (BP) 27.2 mL/m2         E/A ratio 0.92         E wave deceleration time 191 ms         MV Peak E Hermann 96 cm/s         MV Peak A Hermann 1.04 m/s          Report Measurements   AV LVOT peak gradient 5 mmHg              Interventricular Septum Measurements    Shunt Ratio   LVOT peak VTI 21.43 cm         LVOT peak hermann 1.07 m/s              Right Ventricle  "Measurements    Dimensions   RVID d 3.1 cm         Tricuspid annular plane systolic excursion 2.1 cm               Left Atrium Measurements    Dimensions   LA size 4.3 cm         LA length (A2C) 5.1 cm         Volumes   LA volume (BP) 47 mL         LA Volume Index (BP) 27.2 mL/m2               Right Atrium Measurements    Dimensions   RAA A4C 12.2 cm2               Atrial Septum Measurements    Shunt Ratio   LVOT peak VTI 21.43 cm         LVOT peak mihai 1.07 m/s               Aortic Valve Measurements    Stenosis   LVOT peak mihai 1.07 m/s         LVOT peak VTI 21.43 cm         LVOT mn grad 2 mmHg         AV LVOT peak gradient 5 mmHg               Aorta Measurements      Aortic Dimensions   Ao root 3.3 cm         Asc Ao 3 cm                  HOLTER  No results found for this or any previous visit.      Assessment/Plan     Assessment:    Orthostatic hypotension with resting hypotension with syncope  - Patient states he has not had any syncopal episodes in \"a while \"  - Per chart review some of his syncopal episodes have been in the setting of hypoglycemia due to his diabetes mellitus  - Presented with systolic of 70 in the setting of hypovolemia and sepsis  - Currently being treated for pneumonia  - Has secondary adrenal insufficiency, autonomic dysregulation  - Home regimen includes Florinef 0.2 mg daily, hydrocortisone 20 mg in a.m. and 50 mg p.m. and salt tabs and midodrine 15 mg 3 times daily  - Patient currently denies dizziness, lightheadedness, syncope or presyncope  - BP when sitting this morning was 143/62 and he had no symptoms when getting out of bed to chair    Non-MI elevation of troponin   - On arrival patient had troponin elevation at 150 and on repeat was 124  - No ischemic changes on EKG  - No chest pain, shortness of breath or anginal equivalents  - Likely type II MI from hypotension/shock     Secondary adrenal insufficiency  - Managed by endocrinology  - Home regimen includes Florinef 0.2 mg daily, " hydrocortisone 20 mg in the a.m. and 50 mg in p.m. as well as salt tabs  - Endocrinology has been consulted and appreciate recommendations.  Recommend to double hydrocortisone dose for 2 days and then resume baseline dosing after that    Pneumonia  - Likely initial precipitating event of hypotension given secondary adrenal insufficiency in the setting of infection  - Was initially started on Ceftin, azithromycin and vancomycin and Pro-Mt has now significantly down trended  - Management per primary team    Paroxysmal atrial fibrillation  - Patient had paroxysmal atrial fibrillation during one of his ICU stays and was placed on Eliquis  - He remains in sinus rhythm with PACs at this time  - Continue Eliquis 5 mg twice daily.    Prolonged QT interval  - QTc 492  - Given syncopal episodes would monitor on telemetry for evidence of high degree AV block  - Avoid QT prolonging medications  - Repeat EKG in the morning to monitor QT    History of CVA  - Continues on aspirin 81 mg daily and is not on a statin    Type I diabetes mellitus  - Defer management to endocrinology and primary team      Plan:  - Would recommend thigh-high compression stockings and abdominal binder as tolerated  - Agree with increasing steroids as per endocrinology due to secondary adrenal insufficiency  - Encourage increase sodium intake  - Patient does not appear volume overloaded on exam and he is not on diuretic as an outpatient  - Would recommend discontinuation of Lasix        Case discussed and reviewed with Dr. Pappas and is pending their agreement of the assessment and plan.     Thank you for involving us in the care of your patient.    ==========================================================================================  ** Please Note: Fluency DirectDictation voice to text software may have been used in the creation of this document. **

## 2023-12-19 NOTE — PROGRESS NOTES
Karson You is a 68 y.o. male who is currently ordered Vancomycin IV with management by the Pharmacy Consult service.  Relevant clinical data and objective / subjective history reviewed.  Vancomycin Assessment:  Indication and Goal AUC/Trough: Pneumonia (goal -600, trough >10), -600, trough >10  Clinical Status: improving  Micro:     Renal Function:  SCr: 0.9 mg/dL  CrCl: 78.1 mL/min  Renal replacement: Not on dialysis  Days of Therapy: 4  Current Dose: 1000 mg IV Q12H  Vancomycin Plan:  New Dosing:  continue 1gm q12hrs  Estimated AUC: 543 mcg*hr/mL  Estimated Trough: 17.5 mcg/mL  Next Level: 12/24 0600  Renal Function Monitoring: Daily BMP and UOP  Pharmacy will continue to follow closely for s/sx of nephrotoxicity, infusion reactions and appropriateness of therapy.  BMP and CBC will be ordered per protocol. We will continue to follow the patient’s culture results and clinical progress daily.    Nyla Azar, Pharmacist

## 2023-12-19 NOTE — ASSESSMENT & PLAN NOTE
Lab Results   Component Value Date    HGBA1C 8.8 (H) 11/11/2023      Latest Reference Range & Units 12/14/23 08:25   Glucose, Random 65 - 140 mg/dL 242 (H)   (H): Data is abnormally high    Blood Sugar Average: Last 72 hrs:  (P) [id number]  Home diabetic regimen of 18 units of Toujeo nightly, lispro 6 units 3 times daily before every meal   Glucose labile, will consult endocrinology for input

## 2023-12-19 NOTE — PLAN OF CARE
"  Problem: OCCUPATIONAL THERAPY ADULT  Goal: Performs self-care activities at highest level of function for planned discharge setting.  See evaluation for individualized goals.  Description: Treatment Interventions: ADL retraining, Functional transfer training, Endurance training, Cognitive reorientation, Patient/family training, Equipment evaluation/education, Compensatory technique education, Energy conservation, Activityengagement          See flowsheet documentation for full assessment, interventions and recommendations.   Note: Limitation: Decreased ADL status, Decreased UE strength, Decreased Safe judgement during ADL, Decreased cognition, Decreased endurance, Decreased self-care trans, Decreased high-level ADLs  Prognosis: Fair  Assessment: PT SEEN FOR OT TX SESSION WITH FOCUS ON OVERALL COGNITIVE STATUS, ADLS, FUNCTIONAL TXFS/MOBILITY AND OVERALL ACTIVITY TOLERANCE. PT ORIENTED TO PERSON, PLACE AND GROSSLY ORIENTED TO TIME. PT REQUIRES S FOR BED MOBILITY, S-MIN A FOR TXFS AND MIN A FOR FUNCTIONAL MOBILITY WITH USE OF RW. PT CONTS TO REQUIRE MIN A FOR UB ADLS AND MOD A FOR LB ADLS. PT REPORTS SESSION AS \"MEDIUM\" DIFFICULTY. PT LEFT OOB WITH ALL NEEDS IN REACH + ALARM ON. ALL CURRENT QUESTIONS ADDRESSED FROM PT/ PT'S DAUGHTER. BOTH EAGER FOR PT TO RETURN TO REHAB. WILL CONT TO FOLLOW TO ADDRESS THE INITIALLY ESTABLISHED OT GOALS.     Rehab Resource Intensity Level, OT: II (Moderate Resource Intensity)          "

## 2023-12-19 NOTE — ASSESSMENT & PLAN NOTE
Continue home hydrocortisone 20mg a.m., 15 mg p.m., fludrocortisone 0.2 mg daily   Also on salt tabs   Endocrinology consulted

## 2023-12-19 NOTE — CONSULTS
Vancomycin IV Pharmacy-to-Dose Consultation     Vancomycin has been discontinued.  Pharmacy will sign off.  Please contact or re-consult with questions.    Dina De Oliveira, Pharmacist

## 2023-12-19 NOTE — PHYSICAL THERAPY NOTE
"Physical Therapy Progress Note     12/19/23 1525   PT Last Visit   PT Visit Date 12/19/23   Note Type   Note Type Treatment   Pain Assessment   Pain Score No Pain   Restrictions/Precautions   Other Precautions Cognitive;Chair Alarm;Bed Alarm;Fall Risk;Multiple lines   Subjective   Subjective Pt encountered supine in bed, pleasant and agreeable to treatment.  Pt answers all questions with short, simple responses.  Denies changes in status with activity.  Daughter present & stated pt primarily lives on 1st floor of home, walks short ditances between rooms & only attempts stairs to 2nd floor \" on good days\" to take shower only when she is present.  She reports he has had many recent falls, but attributes them to hypotension rather than to gait dysfunction.   Bed Mobility   Supine to Sit 5  Supervision   Additional items Assist x 1   Transfers   Sit to Stand 5  Supervision   Additional items Assist x 1;Armrests;Increased time required;Impulsive   Stand to Sit 4  Minimal assistance   Additional items Assist x 1;Armrests;Increased time required   Ambulation/Elevation   Gait pattern Short stride;Inconsistent melva;Shuffling;Decreased foot clearance;Improper Weight shift   Gait Assistance 4  Minimal assist   Additional items Assist x 1   Assistive Device Rolling walker   Distance 50' x 2   Balance   Static Sitting Fair +   Static Standing Fair   Ambulatory Fair -   Activity Tolerance   Activity Tolerance Patient tolerated treatment well   Nurse Made Aware AMBROSIO Martinez   Assessment   Prognosis Good   Problem List Decreased strength;Decreased endurance;Impaired balance;Decreased mobility;Decreased coordination;Decreased cognition;Impaired judgement;Decreased safety awareness   Assessment Pt demonstrated improved functional endurance & mobility this session, performing all transfers without need for assist, then ambulated up to household distances with use of RW in method similar to baseline as per conversation with daughter.  Pt " "does fatigue with activity, reporting \"medium\" difficulty when prompted about the same.  While pt is nearing baseline, he will continue to benefit from therapy services to continue focusing on functional endurance, generalized strengthening, and trial steps to ensure safe return home when appropriate at level where family & caregivers can resume appropriate level of care.  PT POC & d/c recommendations remain appriate at this time.   Goals   Patient Goals to get better   STG Expiration Date 12/29/23   PT Treatment Day 1   Plan   Treatment/Interventions Functional transfer training;LE strengthening/ROM;Therapeutic exercise;Elevations;Endurance training;Patient/family training;Equipment eval/education;Bed mobility;Gait training   Progress Progressing toward goals   PT Frequency 3-5x/wk   Discharge Recommendation   Rehab Resource Intensity Level, PT II (Moderate Resource Intensity)   Equipment Recommended Walker   Walker Package Recommended Wheeled walker   AM-PAC Basic Mobility Inpatient   Turning in Flat Bed Without Bedrails 4   Lying on Back to Sitting on Edge of Flat Bed Without Bedrails 4   Moving Bed to Chair 3   Standing Up From Chair Using Arms 3   Walk in Room 3   Climb 3-5 Stairs With Railing 2   Basic Mobility Inpatient Raw Score 19   Basic Mobility Standardized Score 42.48   Highest Level Of Mobility   JH-HLM Goal 6: Walk 10 steps or more   JH-HLM Achieved 7: Walk 25 feet or more       Lam Camp PTA    An [facility] Basic Mobility Raw Score less than 17 suggests pt would benefit from post acute rehab.  Please also refer to the recommendation of the Physical Therapist for safe discharge planning.    "

## 2023-12-19 NOTE — ASSESSMENT & PLAN NOTE
Noted on EKG during rapid. Repeat now  Telemetry  On zoloft but that should not prolong QTC  DC PRN Zofran

## 2023-12-19 NOTE — ASSESSMENT & PLAN NOTE
Patient was noted to have altered mental status while at SNF, he reportedly returned to baseline in the ED  Suspect multifactorial in setting of above. Patient was recently seen by neurology while at [facility] last week and recommended for outpatient EEG  Patient was RR overnight again 12/18-12/19 for AMS  CT head was unremarkable and now back to baseline  Daughter requesting inpatient cardiology evaluation

## 2023-12-19 NOTE — PLAN OF CARE
Problem: PAIN - ADULT  Goal: Verbalizes/displays adequate comfort level or baseline comfort level  Description: Interventions:  - Encourage patient to monitor pain and request assistance  - Assess pain using appropriate pain scale  - Administer analgesics based on type and severity of pain and evaluate response  - Implement non-pharmacological measures as appropriate and evaluate response  - Consider cultural and social influences on pain and pain management  - Notify physician/advanced practitioner if interventions unsuccessful or patient reports new pain  Outcome: Progressing     Problem: SAFETY ADULT  Goal: Patient will remain free of falls  Description: INTERVENTIONS:  - Educate patient/family on patient safety including physical limitations  - Instruct patient to call for assistance with activity   - Consult OT/PT to assist with strengthening/mobility   - Keep Call bell within reach  - Keep bed low and locked with side rails adjusted as appropriate  - Keep care items and personal belongings within reach  - Initiate and maintain comfort rounds  - Make Fall Risk Sign visible to staff  - Offer Toileting every 2 Hours, in advance of need  - Initiate/Maintain bed alarm  - Apply yellow socks and bracelet for high fall risk patients  - Consider moving patient to room near nurses station  Outcome: Progressing

## 2023-12-19 NOTE — PLAN OF CARE
"  Problem: PHYSICAL THERAPY ADULT  Goal: Performs mobility at highest level of function for planned discharge setting.  See evaluation for individualized goals.  Description: Treatment/Interventions: Functional transfer training, LE strengthening/ROM, Therapeutic exercise, Endurance training, Cognitive reorientation, Patient/family training, Equipment eval/education, Bed mobility, Gait training, Spoke to nursing          See flowsheet documentation for full assessment, interventions and recommendations.  Outcome: Progressing  Note: Prognosis: Good  Problem List: Decreased strength, Decreased endurance, Impaired balance, Decreased mobility, Decreased coordination, Decreased cognition, Impaired judgement, Decreased safety awareness  Assessment: Pt demonstrated improved functional endurance & mobility this session, performing all transfers without need for assist, then ambulated up to household distances with use of RW in method similar to baseline as per conversation with daughter.  Pt does fatigue with activity, reporting \"medium\" difficulty when prompted about the same.  While pt is nearing baseline, he will continue to benefit from therapy services to continue focusing on functional endurance, generalized strengthening, and trial steps to ensure safe return home when appropriate at level where family & caregivers can resume appropriate level of care.  PT POC & d/c recommendations remain appriate at this time.  Barriers to Discharge: Inaccessible home environment, Decreased caregiver support     Rehab Resource Intensity Level, PT: II (Moderate Resource Intensity)    See flowsheet documentation for full assessment.        "

## 2023-12-19 NOTE — ASSESSMENT & PLAN NOTE
Patient found to have systolic blood pressure in the 70s while in the ED.  Received 1 L IV bolus with no improvement. He was then given midodrine, hydrocortisone IV and Levophed. Pressor was weaned after 1 hour with continued stable vitals. Per chart review has been an issue several times during prior hospitalizations in past.  Initial lactate 2.2, normalized with IVF  Even though patient has no respiratory symptoms chest x-ray was concerning for PNA and with elevated procalcitonin, treating as such below  Overall improved   Daughter requesting cardiology evaluation given negative neuro w/u while at [facility] with recurrent events

## 2023-12-19 NOTE — CASE MANAGEMENT
Case Management Discharge Planning Note    Patient name Karson You  Location [facility]-01 MRN [id number]  : 1955 Date 2023       Current Admission Date: 2023  Current Admission Diagnosis:Shock (HCC)   Patient Active Problem List    Diagnosis Date Noted    Pneumonia 2023    Shock (HCC) 2023    Diarrhea 2023    Elevated troponin 2023    Acute metabolic encephalopathy 2023    Hypokalemia 2023    Hypomagnesemia 2023    Hypotension/syncope 11/10/2023    Fall 11/10/2023    Adrenal insufficiency (HCC) 11/10/2023    Orthostatic hypotension 11/10/2023    Type 1 diabetes mellitus with hypoglycemia ([facility]) 11/10/2023    History of CVA (cerebrovascular accident) 11/10/2023    Paroxysmal atrial fibrillation ([facility]) 11/10/2023    BERNA (acute kidney injury) ([facility]) 2023    Spells of decreased attentiveness 2023    Cerebrovascular accident (CVA) ([facility]) 2023    Iron deficiency anemia, unspecified 2023    Syncope 2023    Unspecified protein-calorie malnutrition ([facility]) 2022    Unintentional weight loss 2022    Type 2 MI (myocardial infarction) ([facility]) 2022    Orthostatic hypotension 2022    Recurrent hypoglycemia 10/19/2022    T1DM (type 1 diabetes mellitus) ([facility]) 10/19/2022    Paroxysmal atrial fibrillation  10/19/2022    History of stroke 10/19/2022    Anemia of chronic disease 10/19/2022    Psychiatric disorder 10/19/2022      LOS (days): 5  Geometric Mean LOS (GMLOS) (days): 5.1  Days to GMLOS:0.2     OBJECTIVE:  Risk of Unplanned Readmission Score: 39.84         Current admission status: Inpatient   Preferred Pharmacy:   [id number] - [place] - [address] [place] [address]  [place] [address]  Phone: [phone] Fax: [phone]    [facility] [place] - [address]  [address]  [place] [address]  Phone: [phone] Fax:  [phone]    [id number] - [place] - [address]  [address]  [place] [address]  Phone: [phone] Fax: [phone]    Primary Care Provider: Tree Spence MD    Primary Insurance: [facility]  Secondary Insurance:     DISCHARGE DETAILS:            Other Referral/Resources/Interventions Provided:  Referral Comments: S/w SLIM & informed pt is not medically cleared for discharge. AIDIN message sent to [facility]. They informed we might need a new auth. Updated therapy.

## 2023-12-19 NOTE — ASSESSMENT & PLAN NOTE
Likely CAP on imaging. Procal was elevated. Initially started on Rocephin/Azithromycin, vanco was added on 12/16 given ongoing fevers  Cultures thus far negative  Will complete 7 days of abx  Check updated procal  Repeat CXR pending   Leukocytosis resolved   Speech evaluated on 12/16, soft diet recommended (level 3 with thins)--ordered adjusted 12/19

## 2023-12-19 NOTE — PLAN OF CARE
Problem: PAIN - ADULT  Goal: Verbalizes/displays adequate comfort level or baseline comfort level  Description: Interventions:  - Encourage patient to monitor pain and request assistance  - Assess pain using appropriate pain scale  - Administer analgesics based on type and severity of pain and evaluate response  - Implement non-pharmacological measures as appropriate and evaluate response  - Consider cultural and social influences on pain and pain management  - Notify physician/advanced practitioner if interventions unsuccessful or patient reports new pain  Outcome: Progressing     Problem: INFECTION - ADULT  Goal: Absence or prevention of progression during hospitalization  Description: INTERVENTIONS:  - Assess and monitor for signs and symptoms of infection  - Monitor lab/diagnostic results  - Monitor all insertion sites, i.e. indwelling lines, tubes, and drains  - Monitor endotracheal if appropriate and nasal secretions for changes in amount and color  - [place] appropriate cooling/warming therapies per order  - Administer medications as ordered  - Instruct and encourage patient and family to use good hand hygiene technique  - Identify and instruct in appropriate isolation precautions for identified infection/condition  Outcome: Progressing  Goal: Absence of fever/infection during neutropenic period  Description: INTERVENTIONS:  - Monitor WBC    Outcome: Progressing     Problem: SAFETY ADULT  Goal: Patient will remain free of falls  Description: INTERVENTIONS:  - Educate patient/family on patient safety including physical limitations  - Instruct patient to call for assistance with activity   - Consult OT/PT to assist with strengthening/mobility   - Keep Call bell within reach  - Keep bed low and locked with side rails adjusted as appropriate  - Keep care items and personal belongings within reach  - Initiate and maintain comfort rounds  - Make Fall Risk Sign visible to staff  - Offer Toileting every  Hours,  in advance of need  - Initiate/Maintain alarm  - Obtain necessary fall risk management equipment:   - Apply yellow socks and bracelet for high fall risk patients  - Consider moving patient to room near nurses station  Outcome: Progressing  Goal: Maintain or return to baseline ADL function  Description: INTERVENTIONS:  -  Assess patient's ability to carry out ADLs; assess patient's baseline for ADL function and identify physical deficits which impact ability to perform ADLs (bathing, care of mouth/teeth, toileting, grooming, dressing, etc.)  - Assess/evaluate cause of self-care deficits   - Assess range of motion  - Assess patient's mobility; develop plan if impaired  - Assess patient's need for assistive devices and provide as appropriate  - Encourage maximum independence but intervene and supervise when necessary  - Involve family in performance of ADLs  - Assess for home care needs following discharge   - Consider OT consult to assist with ADL evaluation and planning for discharge  - Provide patient education as appropriate  Outcome: Progressing  Goal: Maintains/Returns to pre admission functional level  Description: INTERVENTIONS:  - Perform AM-PAC 6 Click Basic Mobility/ Daily Activity assessment daily.  - Set and communicate daily mobility goal to care team and patient/family/caregiver.   - Collaborate with rehabilitation services on mobility goals if consulted  - Perform Range of Motion  times a day.  - Reposition patient every  hours.  - Dangle patient  times a day  - Stand patient  times a day  - Ambulate patient  times a day  - Out of bed to chair  times a day   - Out of bed for meals times a day  - Out of bed for toileting  - Record patient progress and toleration of activity level   Outcome: Progressing     Problem: DISCHARGE PLANNING  Goal: Discharge to home or other facility with appropriate resources  Description: INTERVENTIONS:  - Identify barriers to discharge w/patient and caregiver  - Arrange for  needed discharge resources and transportation as appropriate  - Identify discharge learning needs (meds, wound care, etc.)  - Arrange for interpretive services to assist at discharge as needed  - Refer to Case Management Department for coordinating discharge planning if the patient needs post-hospital services based on physician/advanced practitioner order or complex needs related to functional status, cognitive ability, or social support system  Outcome: Progressing     Problem: Knowledge Deficit  Goal: Patient/family/caregiver demonstrates understanding of disease process, treatment plan, medications, and discharge instructions  Description: Complete learning assessment and assess knowledge base.  Interventions:  - Provide teaching at level of understanding  - Provide teaching via preferred learning methods  Outcome: Progressing     Problem: CARDIOVASCULAR - ADULT  Goal: Maintains optimal cardiac output and hemodynamic stability  Description: INTERVENTIONS:  - Monitor I/O, vital signs and rhythm  - Monitor for S/S and trends of decreased cardiac output  - Administer and titrate ordered vasoactive medications to optimize hemodynamic stability  - Assess quality of pulses, skin color and temperature  - Assess for signs of decreased coronary artery perfusion  - Instruct patient to report change in severity of symptoms  Outcome: Progressing     Problem: GASTROINTESTINAL - ADULT  Goal: Maintains or returns to baseline bowel function  Description: INTERVENTIONS:  - Assess bowel function  - Encourage oral fluids to ensure adequate hydration  - Administer IV fluids if ordered to ensure adequate hydration  - Administer ordered medications as needed  - Encourage mobilization and activity  - Consider nutritional services referral to assist patient with adequate nutrition and appropriate food choices  Outcome: Progressing     Problem: GENITOURINARY - ADULT  Goal: Maintains or returns to baseline urinary function  Description:  INTERVENTIONS:  - Assess urinary function  - Encourage oral fluids to ensure adequate hydration if ordered  - Administer IV fluids as ordered to ensure adequate hydration  - Administer ordered medications as needed  - Offer frequent toileting  - Follow urinary retention protocol if ordered  Outcome: Progressing  Goal: Absence of urinary retention  Description: INTERVENTIONS:  - Assess patient’s ability to void and empty bladder  - Monitor I/O  - Bladder scan as needed  - Discuss with physician/AP medications to alleviate retention as needed  - Discuss catheterization for long term situations as appropriate  Outcome: Progressing     Problem: METABOLIC, FLUID AND ELECTROLYTES - ADULT  Goal: Fluid balance maintained  Description: INTERVENTIONS:  - Monitor labs   - Monitor I/O and WT  - Instruct patient on fluid and nutrition as appropriate  - Assess for signs & symptoms of volume excess or deficit  Outcome: Progressing  Goal: Glucose maintained within target range  Description: INTERVENTIONS:  - Monitor Blood Glucose as ordered  - Assess for signs and symptoms of hyperglycemia and hypoglycemia  - Administer ordered medications to maintain glucose within target range  - Assess nutritional intake and initiate nutrition service referral as needed  Outcome: Progressing     Problem: Prexisting or High Potential for Compromised Skin Integrity  Goal: Skin integrity is maintained or improved  Description: INTERVENTIONS:  - Identify patients at risk for skin breakdown  - Assess and monitor skin integrity  - Assess and monitor nutrition and hydration status  - Monitor labs   - Assess for incontinence   - Turn and reposition patient  - Assist with mobility/ambulation  - Relieve pressure over bony prominences  - Avoid friction and shearing  - Provide appropriate hygiene as needed including keeping skin clean and dry  - Evaluate need for skin moisturizer/barrier cream  - Collaborate with interdisciplinary team   - Patient/family  teaching  - Consider wound care consult   Outcome: Progressing     Problem: Nutrition/Hydration-ADULT  Goal: Nutrient/Hydration intake appropriate for improving, restoring or maintaining nutritional needs  Description: Monitor and assess patient's nutrition/hydration status for malnutrition. Collaborate with interdisciplinary team and initiate plan and interventions as ordered.  Monitor patient's weight and dietary intake as ordered or per policy. Utilize nutrition screening tool and intervene as necessary. Determine patient's food preferences and provide high-protein, high-caloric foods as appropriate.     INTERVENTIONS:  - Monitor oral intake, urinary output, labs, and treatment plans  - Assess nutrition and hydration status and recommend course of action  - Evaluate amount of meals eaten  - Assist patient with eating if necessary   - Allow adequate time for meals  - Recommend/ encourage appropriate diets, oral nutritional supplements, and vitamin/mineral supplements  - Order, calculate, and assess calorie counts as needed  - Recommend, monitor, and adjust tube feedings and TPN/PPN based on assessed needs  - Assess need for intravenous fluids  - Provide specific nutrition/hydration education as appropriate  - Include patient/family/caregiver in decisions related to nutrition  Outcome: Progressing

## 2023-12-19 NOTE — RESPIRATORY THERAPY NOTE
RT Protocol Note  Karson You 68 y.o. male MRN: [id number]  [facility] Encounter: [id number]    Assessment    Principal Problem:    Shock (HCC)  Active Problems:    T1DM (type 1 diabetes mellitus) (HCC)    Anemia of chronic disease    Orthostatic hypotension    BERNA (acute kidney injury) (HCC)    Adrenal insufficiency (HCC)    History of CVA (cerebrovascular accident)    Paroxysmal atrial fibrillation (HCC)    Hypokalemia    Diarrhea    Elevated troponin    Acute metabolic encephalopathy    Pneumonia      Home Pulmonary Medications:  N/A  Home Devices/Therapy:  (none)    Past Medical History:   Diagnosis Date    A-fib (HCC)     Adrenal insufficiency (HCC)     Atrial fibrillation (HCC)     Diabetes mellitus (HCC)     Obesity, morbid (HCC) 11/14/2022    Stroke (HCC)      Social History     Socioeconomic History    Marital status: Single     Spouse name: Not on file    Number of children: Not on file    Years of education: Not on file    Highest education level: Not on file   Occupational History    Not on file   Tobacco Use    Smoking status: Former     Current packs/day: 0.25     Average packs/day: 0.3 packs/day for 30.0 years (7.5 ttl pk-yrs)     Types: Cigarettes    Smokeless tobacco: Never   Vaping Use    Vaping status: Never Used   Substance and Sexual Activity    Alcohol use: Not Currently     Alcohol/week: 5.0 standard drinks of alcohol     Types: 5 Cans of beer per week     Comment: Quit in august 2022    Drug use: Never    Sexual activity: Not Currently   Other Topics Concern    Not on file   Social History Narrative    ** Merged History Encounter **          Social Determinants of Health     Financial Resource Strain: Not on file   Food Insecurity: No Food Insecurity (12/6/2023)    Hunger Vital Sign     Worried About Running Out of Food in the Last Year: Never true     Ran Out of Food in the Last Year: Never true   Transportation Needs: No Transportation Needs (12/6/2023)    PRAPARE -  Transportation     Lack of Transportation (Medical): No     Lack of Transportation (Non-Medical): No   Physical Activity: Not on file   Stress: Not on file   Social Connections: Not on file   Intimate Partner Violence: Not on file   Housing Stability: Low Risk  (12/6/2023)    Housing Stability Vital Sign     Unable to Pay for Housing in the Last Year: No     Number of Places Lived in the Last Year: 1     Unstable Housing in the Last Year: No       Subjective         Objective    Physical Exam:   Assessment Type: Assess only  General Appearance: Lethargic, Drowsy  Respiratory Pattern: Normal  Chest Assessment: Chest expansion symmetrical  Bilateral Breath Sounds: Diminished, Rhonchi  Cough: Non-productive, Congested  O2 Device: RA    Vitals:  Blood pressure 145/74, pulse 80, temperature 100 °F (37.8 °C), resp. rate (!) 28, weight 82 kg (180 lb 12.4 oz), SpO2 96%.          Imaging and other studies: I have personally reviewed pertinent reports.   and I have personally reviewed pertinent films in PACS    O2 Device: RA     Plan    Respiratory Plan: Mild Distress pathway  Airway Clearance Plan: Percussive Vest     Resp Comments: (P) Pt. evaluated at bedside per Airway Clearance protocol.  Pt. has had a change in mental status with possible pneumonia/Pulmonary edema.  Pt's breath sounds are diminished with scattered rhonchi bilaterally.  Attempted to start pt on flutter valve/IS but pt unable to do these devices at this time due to lethargy.  Will begin CPT via vest TID and transition to Flutter valve/IS once pt becomes more responsive per Airway Clearance protocol.

## 2023-12-19 NOTE — CONSULTS
Endocrinology Consult Note  Karson You 68 y.o. Male MRN: [id number]  Unit[facility]Bed: [facility]  Encounter: [id number]    CC: Type 1 diabetes mellitus, adrenal insufficiency    Assessment/Plan   Assessment:  Karson You is a 68-year-old male with a past medical history significant for type 1 diabetes mellitus complicated by autonomic neuropathy, secondary adrenal insufficiency, and orthostatic hypotension who is admitted for further management of likely septic and hypovolemic shock and metabolic encephalopathy likely due to pneumonia.    Plan:  Type 1 diabetes mellitus with hyperglycemia  - A1c: 8.8% (11/11/23)  - Outpatient regimen: Toujeo 18 units qhs, Humalog 6 units TID with meals  - Continue Lantus 18 units qhs; do NOT hold this insulin without contacting the provider on IOV-Csou-Snef role. Patient is a type 1 diabetic and does not make his own insulin, so holding this can precipitate DKA.   - Initiate Humalog 3 units TID with meals   - Continue Humalog SSI   - Will titrate insulin regimen carefully given patient's brittle diabetes   - Continue fingerstick blood glucose checks  - Avoid hypoglycemia and treat per protocol  - Discharge recommendations to follow pending clinical course  - Endocrinology will continue to follow and make further recommendations and changes as appropriate    Secondary adrenal insufficiency  - Outpatient regimen: hydrocortisone 20 mg every morning and 15 mg every afternoon  - Increase hydrocortisone to 40 mg every morning and 30 mg every afternoon for 2 days given acute illness and then resume his home physiologic dose    3.   Orthostatic hypotension  - Treated with fludrocortisone and midodrine  - Management per primary team     4.   Pneumonia  - Further management per primary team       History of Present Illness   HPI:  Karson You is a 68-year-old male with a past medical history significant for brittle type 1 diabetes mellitus diagnosed at age 39 complicated by autonomic  neuropathy, secondary adrenal insufficiency, hypertension, hyperlipidemia, paroxysmal atrial fibrillation, anxiety, depression, coronary artery disease, and orthostatic hypotension who is admitted for further management of his septic and hypovolemic shock and metabolic encephalopathy in the setting of pneumonia. Endocrinology is consulted for further management of his type 1 diabetes mellitus.     He was admitted on 12/14/23 following an episode of altered mental status at his SNF along with diarrhea. He was at his baseline in the ED and no diarrhea in the hospital. He was noted to have low blood pressures with systolic BPs in the 70s requiring IVF, midodrine, hydrocortisone 100 mg IV, and Levophed. He had a rapid response overnight for altered mental status attributed to hypotension.     He follows Dr. Butts at [facility] for his outpatient diabetes and adrenal insufficiency care. His most recent A1c was 8.8% on 11/11/23. Per chart review, he takes Lantus 18 units at bedtime with Humalog 6 units before each meal TID. He has been continued on this dose for the majority of his inpatient stay. He is receiving a dental soft cardiac diet. Fingerstick blood glucose trends do reveal labile glucose with one episode of hypoglycemia on 12/17/23.     He takes hydrocortisone 20 mg every morning and 15 mg every afternoon around 2 pm along with fludrocortisone 0.2 mg daily for his adrenal insufficiency. He is also on midodrine 15 mg three times a day before meals and a salt tablet 1 gm three times a day with meals. He has been continued on his home doses of hydrocortisone during his inpatient stay.       Inpatient consult to Endocrinology  Consult performed by: Grecia Whitaker DO  Consult ordered by: Carmenza Leblanc PA-C        Review of Systems   Reason unable to perform ROS: Poor historian.   Neurological:  Positive for dizziness.       Historical Information   Past Medical History:   Diagnosis Date    A-fib (HCC)      Adrenal insufficiency (HCC)     Atrial fibrillation (HCC)     Diabetes mellitus (HCC)     Obesity, morbid (HCC) 11/14/2022    Stroke (HCC)      No past surgical history on file.  Social History   Social History     Substance and Sexual Activity   Alcohol Use Not Currently    Alcohol/week: 5.0 standard drinks of alcohol    Types: 5 Cans of beer per week    Comment: Quit in august 2022     Social History     Substance and Sexual Activity   Drug Use Never     Social History     Tobacco Use   Smoking Status Former    Current packs/day: 0.25    Average packs/day: 0.3 packs/day for 30.0 years (7.5 ttl pk-yrs)    Types: Cigarettes   Smokeless Tobacco Never     Family History:   Family History   Problem Relation Age of Onset    Heart disease Mother     Cancer Father     Multiple sclerosis Sister        Meds/Allergies   Current Facility-Administered Medications   Medication Dose Route Frequency Provider Last Rate Last Admin    acetaminophen (TYLENOL) tablet 975 mg  975 mg Oral Q6H PRN Ida Cha CRNP   975 mg at 12/17/23 0518    albuterol (PROVENTIL HFA,VENTOLIN HFA) inhaler 2 puff  2 puff Inhalation Q4H PRN Alfie Potts   2 puff at 12/17/23 0057    albuterol inhalation solution 2.5 mg  2.5 mg Nebulization Q6H PRN Alfie Potts        apixaban (ELIQUIS) tablet 5 mg  5 mg Oral BID ZA ChamberlainNP   5 mg at 12/19/23 0821    aspirin chewable tablet 81 mg  81 mg Oral Daily Ida Cha, CRNP   81 mg at 12/19/23 0820    atorvastatin (LIPITOR) tablet 10 mg  10 mg Oral Daily With Dinner ZA ChamberlainNP   10 mg at 12/18/23 1802    calcitriol (ROCALTROL) capsule 0.25 mcg  0.25 mcg Oral Daily Ida Cha, CRNP   0.25 mcg at 12/19/23 0820    cefTRIAXone (ROCEPHIN) 1,000 mg in dextrose 5 % 50 mL IVPB  1,000 mg Intravenous Q24H Ida Cha CRNP 100 mL/hr at 12/19/23 0829 1,000 mg at 12/19/23 0829    cyanocobalamin (VITAMIN B-12) tablet 100 mcg  100 mcg Oral Daily Ida Cha, CRNP   100 mcg at 12/19/23  0820    ferrous sulfate tablet 325 mg  325 mg Oral Daily With Breakfast Ida Cha, CRNP   325 mg at 12/19/23 0822    fludrocortisone (FLORINEF) tablet 0.2 mg  0.2 mg Oral Daily Ida Cha, CRNP   0.2 mg at 12/19/23 0823    furosemide (LASIX) tablet 20 mg  20 mg Oral Daily Alfie Potts   20 mg at 12/19/23 0820    hydrocortisone (CORTEF) tablet 15 mg  15 mg Oral QPM Ida Cha, CRNP   15 mg at 12/18/23 1803    hydrocortisone (CORTEF) tablet 20 mg  20 mg Oral QAM Ida Cha, CRNP   20 mg at 12/19/23 0823    insulin glargine (LANTUS) subcutaneous injection 18 Units 0.18 mL  18 Units Subcutaneous HS Alfie Potts   18 Units at 12/18/23 2339    insulin lispro (HumaLOG) 100 units/mL subcutaneous injection 1-5 Units  1-5 Units Subcutaneous TID AC Ida Birminghama, CRNP   4 Units at 12/19/23 0643    magnesium sulfate 4 g/100 mL IVPB (premix) 4 g  4 g Intravenous Once Carmenza Leblanc PA-C 25 mL/hr at 12/19/23 1108 4 g at 12/19/23 1108    midodrine (PROAMATINE) tablet 15 mg  15 mg Oral TID AC Ida Cha, CRNP   15 mg at 12/19/23 0821    potassium chloride (K-DUR,KLOR-CON) CR tablet 20 mEq  20 mEq Oral BID Ida Birminghama, CRNP   20 mEq at 12/19/23 0820    potassium chloride (K-DUR,KLOR-CON) CR tablet 60 mEq  60 mEq Oral Once Carmenza Leblanc PA-C        risperiDONE (RisperDAL) tablet 1 mg  1 mg Oral BID Ida Cha, CRNP   1 mg at 12/19/23 0822    sertraline (ZOLOFT) tablet 100 mg  100 mg Oral Daily Ida Birminghama, CRNP   100 mg at 12/19/23 0821    sodium chloride tablet 1 g  1 g Oral TID Ida Cha, CRNP   1 g at 12/19/23 0821    vancomycin (VANCOCIN) IVPB (premix in dextrose) 1,000 mg 200 mL  1,000 mg Intravenous Q12H Alfie Potts 200 mL/hr at 12/19/23 1111 1,000 mg at 12/19/23 1111     Allergies   Allergen Reactions    Imipramine Other (See Comments)    Lisinopril Other (See Comments)    Paroxetine Other (See Comments)    Penicillins Hives    Rosiglitazone Other (See Comments)     Troglitazone Other (See Comments)       Objective   Vitals:   Blood pressure 143/62, pulse 61, temperature 98 °F (36.7 °C), temperature source Oral, resp. rate 13, weight 82.1 kg (181 lb), SpO2 97%.    Intake/Output Summary (Last 24 hours) at 12/19/2023 1116  Last data filed at 12/19/2023 1114  Gross per 24 hour   Intake 1490 ml   Output 3800 ml   Net -2310 ml     Invasive Devices       Peripheral Intravenous Line  Duration             Peripheral IV 12/14/23 Distal;Left;Upper;Ventral (anterior) Arm 5 days    Peripheral IV 12/18/23 Distal;Dorsal (posterior);Left Forearm <1 day              Drain  Duration             External Urinary Catheter Large 2 days                    Physical Exam  Vitals reviewed.   Constitutional:       Appearance: Normal appearance.   HENT:      Head: Normocephalic and atraumatic.      Mouth/Throat:      Mouth: Mucous membranes are moist.      Pharynx: Oropharynx is clear.   Eyes:      Extraocular Movements: Extraocular movements intact.      Pupils: Pupils are equal, round, and reactive to light.   Cardiovascular:      Rate and Rhythm: Normal rate and regular rhythm.      Pulses: Normal pulses.      Heart sounds: Normal heart sounds.   Pulmonary:      Effort: Pulmonary effort is normal.      Breath sounds: Rhonchi present.   Abdominal:      General: Abdomen is flat. Bowel sounds are normal. There is no distension.      Tenderness: There is no abdominal tenderness.   Musculoskeletal:         General: No deformity. Normal range of motion.      Cervical back: Normal range of motion and neck supple. No tenderness.      Right lower leg: Edema present.      Left lower leg: Edema present.   Skin:     General: Skin is warm and dry.   Neurological:      General: No focal deficit present.      Mental Status: He is alert and oriented to person, place, and time.       The history was obtained from the review of the chart, patient.    Lab Results:       Lab Results   Component Value Date    WBC 6.79  "12/19/2023    HGB 9.1 (L) 12/19/2023    HCT 27.8 (L) 12/19/2023    MCV 97 12/19/2023     12/19/2023     Lab Results   Component Value Date/Time    BUN 13 12/19/2023 06:02 AM    K 2.9 (L) 12/19/2023 06:02 AM     12/19/2023 06:02 AM    CO2 26 12/19/2023 06:02 AM    CO2 27 12/18/2023 10:24 PM    CREATININE 0.90 12/19/2023 06:02 AM    AST 31 12/15/2023 05:27 AM    ALT 22 12/15/2023 05:27 AM    TP 5.2 (L) 12/15/2023 05:27 AM    ALB 3.1 (L) 12/15/2023 05:27 AM     No results for input(s): \"CHOL\", \"HDL\", \"LDL\", \"TRIG\", \"VLDL\" in the last 72 hours.  No results found for: \"MICROALBUR\", \"BWJB05WHP\"  POC Glucose (mg/dl)   Date Value   12/19/2023 214 (H)   12/19/2023 162 (H)       Imaging Studies: I have personally reviewed pertinent reports.      Please TigerText questions to the clinician covering the \"OUE-Eull-Lwwm\" Role. Thank you.   "

## 2023-12-20 PROBLEM — E87.6 HYPOKALEMIA: Status: RESOLVED | Noted: 2023-12-07 | Resolved: 2023-12-20

## 2023-12-20 PROBLEM — I50.32 CHRONIC DIASTOLIC HEART FAILURE (HCC): Status: ACTIVE | Noted: 2023-12-20

## 2023-12-20 PROBLEM — J18.9 PNEUMONIA: Status: RESOLVED | Noted: 2023-12-17 | Resolved: 2023-12-20

## 2023-12-20 LAB
ALBUMIN SERPL BCP-MCNC: 3.1 G/DL (ref 3.5–5)
ALP SERPL-CCNC: 67 U/L (ref 34–104)
ALT SERPL W P-5'-P-CCNC: 19 U/L (ref 7–52)
ANION GAP SERPL CALCULATED.3IONS-SCNC: 6 MMOL/L
AST SERPL W P-5'-P-CCNC: 16 U/L (ref 13–39)
BASOPHILS # BLD MANUAL: 0 THOUSAND/UL (ref 0–0.1)
BASOPHILS NFR MAR MANUAL: 0 % (ref 0–1)
BILIRUB SERPL-MCNC: 0.52 MG/DL (ref 0.2–1)
BUN SERPL-MCNC: 13 MG/DL (ref 5–25)
BURR CELLS BLD QL SMEAR: PRESENT
CALCIUM ALBUM COR SERPL-MCNC: 8.9 MG/DL (ref 8.3–10.1)
CALCIUM SERPL-MCNC: 8.2 MG/DL (ref 8.4–10.2)
CHLORIDE SERPL-SCNC: 102 MMOL/L (ref 96–108)
CO2 SERPL-SCNC: 32 MMOL/L (ref 21–32)
CREAT SERPL-MCNC: 0.88 MG/DL (ref 0.6–1.3)
EOSINOPHIL # BLD MANUAL: 0 THOUSAND/UL (ref 0–0.4)
EOSINOPHIL NFR BLD MANUAL: 0 % (ref 0–6)
ERYTHROCYTE [DISTWIDTH] IN BLOOD BY AUTOMATED COUNT: 14.2 % (ref 11.6–15.1)
GFR SERPL CREATININE-BSD FRML MDRD: 89 ML/MIN/1.73SQ M
GLUCOSE SERPL-MCNC: 139 MG/DL (ref 65–140)
GLUCOSE SERPL-MCNC: 170 MG/DL (ref 65–140)
GLUCOSE SERPL-MCNC: 262 MG/DL (ref 65–140)
GLUCOSE SERPL-MCNC: 314 MG/DL (ref 65–140)
GLUCOSE SERPL-MCNC: 98 MG/DL (ref 65–140)
HCT VFR BLD AUTO: 29.2 % (ref 36.5–49.3)
HGB BLD-MCNC: 9.7 G/DL (ref 12–17)
LYMPHOCYTES # BLD AUTO: 1.09 THOUSAND/UL (ref 0.6–4.47)
LYMPHOCYTES # BLD AUTO: 16 % (ref 14–44)
MAGNESIUM SERPL-MCNC: 1.7 MG/DL (ref 1.9–2.7)
MCH RBC QN AUTO: 32 PG (ref 26.8–34.3)
MCHC RBC AUTO-ENTMCNC: 33.2 G/DL (ref 31.4–37.4)
MCV RBC AUTO: 96 FL (ref 82–98)
MONOCYTES # BLD AUTO: 0.13 THOUSAND/UL (ref 0–1.22)
MONOCYTES NFR BLD: 2 % (ref 4–12)
NEUTROPHILS # BLD MANUAL: 5.2 THOUSAND/UL (ref 1.85–7.62)
NEUTS SEG NFR BLD AUTO: 81 % (ref 43–75)
OVALOCYTES BLD QL SMEAR: PRESENT
PLATELET # BLD AUTO: 180 THOUSANDS/UL (ref 149–390)
PLATELET BLD QL SMEAR: ADEQUATE
PMV BLD AUTO: 11.2 FL (ref 8.9–12.7)
POIKILOCYTOSIS BLD QL SMEAR: PRESENT
POTASSIUM SERPL-SCNC: 3.5 MMOL/L (ref 3.5–5.3)
PROT SERPL-MCNC: 5.8 G/DL (ref 6.4–8.4)
RBC # BLD AUTO: 3.03 MILLION/UL (ref 3.88–5.62)
RBC MORPH BLD: PRESENT
SODIUM SERPL-SCNC: 140 MMOL/L (ref 135–147)
VARIANT LYMPHS # BLD AUTO: 1 %
WBC # BLD AUTO: 6.42 THOUSAND/UL (ref 4.31–10.16)

## 2023-12-20 PROCEDURE — 94760 N-INVAS EAR/PLS OXIMETRY 1: CPT

## 2023-12-20 PROCEDURE — 97530 THERAPEUTIC ACTIVITIES: CPT

## 2023-12-20 PROCEDURE — 94669 MECHANICAL CHEST WALL OSCILL: CPT

## 2023-12-20 PROCEDURE — 94664 DEMO&/EVAL PT USE INHALER: CPT

## 2023-12-20 PROCEDURE — 99232 SBSQ HOSP IP/OBS MODERATE 35: CPT | Performed by: PHYSICIAN ASSISTANT

## 2023-12-20 PROCEDURE — 85007 BL SMEAR W/DIFF WBC COUNT: CPT | Performed by: INTERNAL MEDICINE

## 2023-12-20 PROCEDURE — 97116 GAIT TRAINING THERAPY: CPT

## 2023-12-20 PROCEDURE — 82948 REAGENT STRIP/BLOOD GLUCOSE: CPT

## 2023-12-20 PROCEDURE — 94668 MNPJ CHEST WALL SBSQ: CPT

## 2023-12-20 PROCEDURE — 83735 ASSAY OF MAGNESIUM: CPT | Performed by: INTERNAL MEDICINE

## 2023-12-20 PROCEDURE — 92526 ORAL FUNCTION THERAPY: CPT

## 2023-12-20 PROCEDURE — 80053 COMPREHEN METABOLIC PANEL: CPT | Performed by: INTERNAL MEDICINE

## 2023-12-20 PROCEDURE — 85027 COMPLETE CBC AUTOMATED: CPT | Performed by: INTERNAL MEDICINE

## 2023-12-20 RX ORDER — MAGNESIUM SULFATE HEPTAHYDRATE 40 MG/ML
4 INJECTION, SOLUTION INTRAVENOUS ONCE
Status: COMPLETED | OUTPATIENT
Start: 2023-12-20 | End: 2023-12-20

## 2023-12-20 RX ADMIN — VITAM B12 100 MCG: 100 TAB at 08:56

## 2023-12-20 RX ADMIN — SODIUM CHLORIDE 1 G: 1 TABLET ORAL at 21:27

## 2023-12-20 RX ADMIN — INSULIN LISPRO 3 UNITS: 100 INJECTION, SOLUTION INTRAVENOUS; SUBCUTANEOUS at 17:01

## 2023-12-20 RX ADMIN — HYDROCORTISONE 40 MG: 20 TABLET ORAL at 08:57

## 2023-12-20 RX ADMIN — APIXABAN 5 MG: 5 TABLET, FILM COATED ORAL at 17:03

## 2023-12-20 RX ADMIN — APIXABAN 5 MG: 5 TABLET, FILM COATED ORAL at 08:56

## 2023-12-20 RX ADMIN — INSULIN LISPRO 3 UNITS: 100 INJECTION, SOLUTION INTRAVENOUS; SUBCUTANEOUS at 09:00

## 2023-12-20 RX ADMIN — INSULIN LISPRO 2 UNITS: 100 INJECTION, SOLUTION INTRAVENOUS; SUBCUTANEOUS at 17:01

## 2023-12-20 RX ADMIN — MAGNESIUM SULFATE HEPTAHYDRATE 4 G: 40 INJECTION, SOLUTION INTRAVENOUS at 12:47

## 2023-12-20 RX ADMIN — FLUDROCORTISONE ACETATE 0.2 MG: 0.1 TABLET ORAL at 08:57

## 2023-12-20 RX ADMIN — INSULIN LISPRO 1 UNITS: 100 INJECTION, SOLUTION INTRAVENOUS; SUBCUTANEOUS at 12:03

## 2023-12-20 RX ADMIN — CALCITRIOL 0.25 MCG: 0.25 CAPSULE, LIQUID FILLED ORAL at 08:56

## 2023-12-20 RX ADMIN — FERROUS SULFATE TAB 325 MG (65 MG ELEMENTAL FE) 325 MG: 325 (65 FE) TAB at 09:03

## 2023-12-20 RX ADMIN — INSULIN GLARGINE 18 UNITS: 100 INJECTION, SOLUTION SUBCUTANEOUS at 21:27

## 2023-12-20 RX ADMIN — RISPERIDONE 1 MG: 1 TABLET ORAL at 17:03

## 2023-12-20 RX ADMIN — SERTRALINE HYDROCHLORIDE 100 MG: 100 TABLET ORAL at 08:56

## 2023-12-20 RX ADMIN — RISPERIDONE 1 MG: 1 TABLET ORAL at 08:56

## 2023-12-20 RX ADMIN — HYDROCORTISONE 30 MG: 20 TABLET ORAL at 17:02

## 2023-12-20 RX ADMIN — POTASSIUM CHLORIDE 20 MEQ: 750 TABLET, EXTENDED RELEASE ORAL at 17:03

## 2023-12-20 RX ADMIN — MIDODRINE HYDROCHLORIDE 15 MG: 5 TABLET ORAL at 09:03

## 2023-12-20 RX ADMIN — MIDODRINE HYDROCHLORIDE 15 MG: 5 TABLET ORAL at 12:01

## 2023-12-20 RX ADMIN — ASPIRIN 81 MG CHEWABLE TABLET 81 MG: 81 TABLET CHEWABLE at 08:56

## 2023-12-20 RX ADMIN — SODIUM CHLORIDE 1 G: 1 TABLET ORAL at 17:03

## 2023-12-20 RX ADMIN — INSULIN LISPRO 3 UNITS: 100 INJECTION, SOLUTION INTRAVENOUS; SUBCUTANEOUS at 12:04

## 2023-12-20 RX ADMIN — SODIUM CHLORIDE 1 G: 1 TABLET ORAL at 08:56

## 2023-12-20 RX ADMIN — ATORVASTATIN CALCIUM 10 MG: 10 TABLET, FILM COATED ORAL at 17:03

## 2023-12-20 RX ADMIN — MIDODRINE HYDROCHLORIDE 15 MG: 5 TABLET ORAL at 17:03

## 2023-12-20 RX ADMIN — POTASSIUM CHLORIDE 20 MEQ: 750 TABLET, EXTENDED RELEASE ORAL at 08:56

## 2023-12-20 NOTE — CASE MANAGEMENT
Case Management Discharge Planning Note    Patient name Karson You  Location [facility] 614/[facility] 614-01 MRN [id number]  : 1955 Date 2023       Current Admission Date: 2023  Current Admission Diagnosis:Acute metabolic encephalopathy   Patient Active Problem List    Diagnosis Date Noted    Chronic diastolic heart failure (HCC) 2023    Prolonged Q-T interval on ECG 2023    Shock (HCC) 2023    Elevated troponin 2023    Acute metabolic encephalopathy 2023    Hypomagnesemia 2023    Hypotension/syncope 11/10/2023    Fall 11/10/2023    Adrenal insufficiency (HCC) 11/10/2023    Orthostatic hypotension 11/10/2023    Type 1 diabetes mellitus with hypoglycemia (HCC) 11/10/2023    History of CVA (cerebrovascular accident) 11/10/2023    Paroxysmal atrial fibrillation (HCC) 11/10/2023    Spells of decreased attentiveness 2023    Cerebrovascular accident (CVA) (HCC) 2023    Iron deficiency anemia, unspecified 2023    Syncope 2023    Unspecified protein-calorie malnutrition (HCC) 2022    Unintentional weight loss 2022    Type 2 MI (myocardial infarction) (HCC) 2022    Orthostatic hypotension 2022    Recurrent hypoglycemia 10/19/2022    T1DM (type 1 diabetes mellitus) ([facility]) 10/19/2022    Paroxysmal atrial fibrillation  10/19/2022    History of stroke 10/19/2022    Anemia of chronic disease 10/19/2022    Psychiatric disorder 10/19/2022      LOS (days): 6  Geometric Mean LOS (GMLOS) (days): 5  Days to GMLOS:-1.1     OBJECTIVE:  Risk of Unplanned Readmission Score: 39.13         Current admission status: Inpatient   Preferred Pharmacy:   [id number] - [place] - [address] [place] [address]  [place] [address]  Phone: [phone] Fax: [phone]    [facility] [place] - [address]  [address]  [place] [address]  Phone: [phone] Fax:  [phone]    [id number] - [place] - [address]  [address]  [place] [address]  Phone: [phone] Fax: [phone]    Primary Care Provider: Tree Spence MD    Primary Insurance: [facility] REP  Secondary Insurance:     DISCHARGE DETAILS:    Other Referral/Resources/Interventions Provided:  Referral Comments: Post acute auth tasked to CM DC support for Michael Salmeron, as patient will be ready for DC

## 2023-12-20 NOTE — ASSESSMENT & PLAN NOTE
CXR demonstrates bilateral perihilar and basilar opacities with slight worsening on the right side.  Procalcitonin was elevated but has significantly improved therefore antibiotics have been discontinued.  Leukocytosis resolved.

## 2023-12-20 NOTE — CASE MANAGEMENT
[facility] ([phone]) Authorization Below is still valid if patient discharges to SNF on 12/21/23 before Midnight. If patient does not discharge before midnight on 12/21/23 a New Authorization will be needed. SNF will need to call Care Coordinator to inform of patient's admission. Notified CM: Nyla Canada     Faxed Updated Clinicals to [phone] as requested by insurance.     (Authorization Below Copied from [facility]'s EPIC note 12/18/23)    [facility] has received approved authorization from insurance:   [employer]  Called in by Rep: Jessica KIRK#  [phone]  Authorization received for: SNF  Facility: [facility]   Authorization #: [id number]  Start of Care: 12/16  Next Review Date: 12/19  Continued Stay Care Coordinator: Zara KIRK#: [phone]  Submit next review to: [phone]   Care Manager notified:  Angel Dhaliwal

## 2023-12-20 NOTE — ASSESSMENT & PLAN NOTE
Home regimen: hydrocortisone 20 mg in AM and 15 mg in PM along with fludrocortisone 0.2 mg daily and sodium chloride tablets 1 mg TID.  Endocrinology consulted and recommended 2 days of increased doses of hydrocortisone (40 mg in AM and 30 mg in PM).  Cardiology consulted and recommend compression stockings along with abdominal binder.

## 2023-12-20 NOTE — PHYSICAL THERAPY NOTE
Physical Therapy Progress Note     12/20/23 1523   PT Last Visit   PT Visit Date 12/20/23   Note Type   Note Type Treatment   Pain Assessment   Pain Score No Pain   Restrictions/Precautions   Other Precautions Cognitive;Chair Alarm;Fall Risk;Multiple lines   Subjective   Subjective Pt encountered seated in recliner, pleasant and agreeable to treatment.  Reports no complaints when prompted.  Pt maintains flat affect & answers all questions appropriately with short responses.   Transfers   Sit to Stand 5  Supervision   Additional items Assist x 1;Armrests;Increased time required   Stand to Sit 4  Minimal assistance   Additional items Assist x 1   Ambulation/Elevation   Gait pattern Short stride;Inconsistent melva;Decreased foot clearance;Improper Weight shift   Gait Assistance 4  Minimal assist   Additional items Assist x 1   Assistive Device Rolling walker   Distance 100' x 2   Balance   Static Sitting Fair +   Static Standing Fair   Ambulatory Fair -   Activity Tolerance   Activity Tolerance Patient tolerated treatment well   Nurse Made Aware AMBROSIO Sanchez   Assessment   Prognosis Good   Problem List Decreased strength;Decreased endurance;Impaired balance;Decreased mobility;Decreased coordination;Decreased cognition;Impaired judgement;Decreased safety awareness   Assessment pt demonstrated improved functional endurance this session, ambulating further distances with use of RW & no LOB or complaints of excessive fatigue afterwards.  Performs all transfers in similar manner to previous session, and only required instructions for controlled descent upon sitting to prevent flopping.  Pt denies complaints associated with orthostatic BP when prompted, which again is consistant with previous trials.  PT POC & d/c recommendations remain appropriate pending LOS, medical stability & requisite assist at d/c which may still be above what family & caregivers can provide at this time.   Goals   Patient Goals none stated as he was  working with SLP post session   STG Expiration Date 12/29/23   PT Treatment Day 2   Plan   Treatment/Interventions Functional transfer training;LE strengthening/ROM;Elevations;Therapeutic exercise;Endurance training;Patient/family training;Bed mobility;Equipment eval/education;Gait training   Progress Progressing toward goals   PT Frequency 3-5x/wk   Discharge Recommendation   Rehab Resource Intensity Level, PT II (Moderate Resource Intensity)   Equipment Recommended Walker   Walker Package Recommended Wheeled walker   AM-PAC Basic Mobility Inpatient   Turning in Flat Bed Without Bedrails 4   Lying on Back to Sitting on Edge of Flat Bed Without Bedrails 4   Moving Bed to Chair 3   Standing Up From Chair Using Arms 3   Walk in Room 3   Climb 3-5 Stairs With Railing 2   Basic Mobility Inpatient Raw Score 19   Basic Mobility Standardized Score 42.48   Highest Level Of Mobility   JH-HLM Goal 6: Walk 10 steps or more   JH-HLM Achieved 7: Walk 25 feet or more       Lam Camp PTA    An [facility] Basic Mobility Raw Score less than 17 suggests pt would benefit from post acute rehab.  Please also refer to the recommendation of the Physical Therapist for safe discharge planning.

## 2023-12-20 NOTE — ASSESSMENT & PLAN NOTE
Wt Readings from Last 3 Encounters:   12/20/23 82.2 kg (181 lb 3.2 oz)   12/05/23 67.6 kg (149 lb)   11/10/23 68 kg (150 lb)     Patient with an elevated BNP: 666  CXR demonstrates possibility of pulmonary edema.  ECHO in January 2023 demonstrates normal EF (65%) with grade 1 diastolic dysfunction.  Physical exam does not demonstrate evidence of acute volume overload.  Given patient's hypotension, would be hesitant to diurese.

## 2023-12-20 NOTE — QUICK NOTE
"Endocrinology Quick Note  Karson You 68 y.o. Male MRN: [id number]  Unit/Bed: [facility] 614/[facility] 614-01  Encounter: [id number]        Fingerstick blood glucose trends reviewed and do not reveal hypoglycemia after addition of mealtime insulin. Continue Lantus 18 units qhs, Humalog 3 units TID with meals, and Humalog SSI algorithm 1. Given brittle diabetes, will carefully adjust insulin doses to prevent severe glycemic excursions. Avoid hypoglycemia and treat per protocol.    Patient remains hemodynamically stable and offers no acute complaints. Will continue hydrocortisone 40 mg in the morning and 30 mg in the afternoon for 1 more day and decrease back to his physiologic dose on 12/22/23.     Please Tigertext questions to the clinician covering the \"[id number]-Jipj-Ussm\" Role. Thank you.    "

## 2023-12-20 NOTE — SPEECH THERAPY NOTE
Speech Language/Pathology    Speech/Language Pathology Progress Note    Patient Name: Karson You  Today's Date: 12/20/2023     Problem List  Principal Problem:    Acute metabolic encephalopathy  Active Problems:    T1DM (type 1 diabetes mellitus) (HCC)    Anemia of chronic disease    Orthostatic hypotension    Adrenal insufficiency (HCC)    History of CVA (cerebrovascular accident)    Paroxysmal atrial fibrillation (HCC)    Hypomagnesemia    Shock (HCC)    Elevated troponin    Prolonged Q-T interval on ECG    Chronic diastolic heart failure (HCC)       Past Medical History  Past Medical History:   Diagnosis Date    A-fib (HCC)     Adrenal insufficiency (HCC)     Atrial fibrillation (HCC)     Diabetes mellitus (HCC)     Obesity, morbid (HCC) 11/14/2022    Stroke (HCC)         Past Surgical History  No past surgical history on file.      Subjective:  Patient awake. He is sitting in recliner after working with PT.     Objective:  The patient is seen for dysphagia therapy. He is assessed with regular peanut butter and jelly sandwich. The patient feeds himself. Bite strength is good-min reduced at times. Mastication time is prolonged, but efficient. No oral residue is observed. The patient takes small, consecutive sips of thin liquids via straw. No overt s/s aspiration observed. Patient reports have partial, which he is wearing. He denies difficulty swallowing and that he typically eats a regular diet.     Assessment:  The patient tolerated upgraded trials well.     Plan/Recommendations:  Recommend diet change to regular with thin liquids. Continue ST to further assess tolerance.

## 2023-12-20 NOTE — ASSESSMENT & PLAN NOTE
Patient was noted to have altered mental status while at SNF, he reportedly returned to baseline in the ED  Suspect multifactorial in setting of above. Patient was recently seen by neurology while at [facility] last week and recommended for outpatient EEG  Patient was RR overnight again 12/18-12/19 for AMS  CT head was unremarkable and now back to baseline  Recommend out-patient neurology follow up

## 2023-12-20 NOTE — ASSESSMENT & PLAN NOTE
Non-MI troponin elevation in the setting of hypotension.  No ischemic changes seen on EKG.  Cardiology has evaluated the patient.  No ischemic work up is warranted at this time.

## 2023-12-20 NOTE — ASSESSMENT & PLAN NOTE
Chronic, in the setting of adrenal insufficiency and autonomic dysfunction.  Continue hydrocortisone and fludrocortisone, doses of which are increased x2 days then resume home regimen.  Continue midodrine 3 times daily (home dose)  Encourage use of compression stockings and abdominal binder.

## 2023-12-20 NOTE — PLAN OF CARE
Problem: PAIN - ADULT  Goal: Verbalizes/displays adequate comfort level or baseline comfort level  Description: Interventions:  - Encourage patient to monitor pain and request assistance  - Assess pain using appropriate pain scale  - Administer analgesics based on type and severity of pain and evaluate response  - Implement non-pharmacological measures as appropriate and evaluate response  - Consider cultural and social influences on pain and pain management  - Notify physician/advanced practitioner if interventions unsuccessful or patient reports new pain  Outcome: Progressing     Problem: INFECTION - ADULT  Goal: Absence or prevention of progression during hospitalization  Description: INTERVENTIONS:  - Assess and monitor for signs and symptoms of infection  - Monitor lab/diagnostic results  - Monitor all insertion sites, i.e. indwelling lines, tubes, and drains  - Monitor endotracheal if appropriate and nasal secretions for changes in amount and color  - [place] appropriate cooling/warming therapies per order  - Administer medications as ordered  - Instruct and encourage patient and family to use good hand hygiene technique  - Identify and instruct in appropriate isolation precautions for identified infection/condition  Outcome: Progressing  Goal: Absence of fever/infection during neutropenic period  Description: INTERVENTIONS:  - Monitor WBC    Outcome: Progressing     Problem: SAFETY ADULT  Goal: Patient will remain free of falls  Description: INTERVENTIONS:  - Educate patient/family on patient safety including physical limitations  - Instruct patient to call for assistance with activity   - Consult OT/PT to assist with strengthening/mobility   - Keep Call bell within reach  - Keep bed low and locked with side rails adjusted as appropriate  - Keep care items and personal belongings within reach  - Initiate and maintain comfort rounds  - Make Fall Risk Sign visible to staff  - Offer Toileting every  Hours,  in advance of need  - Initiate/Maintain alarm  - Obtain necessary fall risk management equipment:   - Apply yellow socks and bracelet for high fall risk patients  - Consider moving patient to room near nurses station  Outcome: Progressing  Goal: Maintain or return to baseline ADL function  Description: INTERVENTIONS:  -  Assess patient's ability to carry out ADLs; assess patient's baseline for ADL function and identify physical deficits which impact ability to perform ADLs (bathing, care of mouth/teeth, toileting, grooming, dressing, etc.)  - Assess/evaluate cause of self-care deficits   - Assess range of motion  - Assess patient's mobility; develop plan if impaired  - Assess patient's need for assistive devices and provide as appropriate  - Encourage maximum independence but intervene and supervise when necessary  - Involve family in performance of ADLs  - Assess for home care needs following discharge   - Consider OT consult to assist with ADL evaluation and planning for discharge  - Provide patient education as appropriate  Outcome: Progressing  Goal: Maintains/Returns to pre admission functional level  Description: INTERVENTIONS:  - Perform AM-PAC 6 Click Basic Mobility/ Daily Activity assessment daily.  - Set and communicate daily mobility goal to care team and patient/family/caregiver.   - Collaborate with rehabilitation services on mobility goals if consulted  - Perform Range of Motion  times a day.  - Reposition patient every  hours.  - Dangle patient  times a day  - Stand patient  times a day  - Ambulate patient  times a day  - Out of bed to chair  times a day   - Out of bed for meals  times a day  - Out of bed for toileting  - Record patient progress and toleration of activity level   Outcome: Progressing     Problem: DISCHARGE PLANNING  Goal: Discharge to home or other facility with appropriate resources  Description: INTERVENTIONS:  - Identify barriers to discharge w/patient and caregiver  - Arrange for  needed discharge resources and transportation as appropriate  - Identify discharge learning needs (meds, wound care, etc.)  - Arrange for interpretive services to assist at discharge as needed  - Refer to Case Management Department for coordinating discharge planning if the patient needs post-hospital services based on physician/advanced practitioner order or complex needs related to functional status, cognitive ability, or social support system  Outcome: Progressing     Problem: Knowledge Deficit  Goal: Patient/family/caregiver demonstrates understanding of disease process, treatment plan, medications, and discharge instructions  Description: Complete learning assessment and assess knowledge base.  Interventions:  - Provide teaching at level of understanding  - Provide teaching via preferred learning methods  Outcome: Progressing     Problem: CARDIOVASCULAR - ADULT  Goal: Maintains optimal cardiac output and hemodynamic stability  Description: INTERVENTIONS:  - Monitor I/O, vital signs and rhythm  - Monitor for S/S and trends of decreased cardiac output  - Administer and titrate ordered vasoactive medications to optimize hemodynamic stability  - Assess quality of pulses, skin color and temperature  - Assess for signs of decreased coronary artery perfusion  - Instruct patient to report change in severity of symptoms  Outcome: Progressing     Problem: GASTROINTESTINAL - ADULT  Goal: Maintains or returns to baseline bowel function  Description: INTERVENTIONS:  - Assess bowel function  - Encourage oral fluids to ensure adequate hydration  - Administer IV fluids if ordered to ensure adequate hydration  - Administer ordered medications as needed  - Encourage mobilization and activity  - Consider nutritional services referral to assist patient with adequate nutrition and appropriate food choices  Outcome: Progressing     Problem: GENITOURINARY - ADULT  Goal: Maintains or returns to baseline urinary function  Description:  INTERVENTIONS:  - Assess urinary function  - Encourage oral fluids to ensure adequate hydration if ordered  - Administer IV fluids as ordered to ensure adequate hydration  - Administer ordered medications as needed  - Offer frequent toileting  - Follow urinary retention protocol if ordered  Outcome: Progressing  Goal: Absence of urinary retention  Description: INTERVENTIONS:  - Assess patient’s ability to void and empty bladder  - Monitor I/O  - Bladder scan as needed  - Discuss with physician/AP medications to alleviate retention as needed  - Discuss catheterization for long term situations as appropriate  Outcome: Progressing     Problem: METABOLIC, FLUID AND ELECTROLYTES - ADULT  Goal: Fluid balance maintained  Description: INTERVENTIONS:  - Monitor labs   - Monitor I/O and WT  - Instruct patient on fluid and nutrition as appropriate  - Assess for signs & symptoms of volume excess or deficit  Outcome: Progressing  Goal: Glucose maintained within target range  Description: INTERVENTIONS:  - Monitor Blood Glucose as ordered  - Assess for signs and symptoms of hyperglycemia and hypoglycemia  - Administer ordered medications to maintain glucose within target range  - Assess nutritional intake and initiate nutrition service referral as needed  Outcome: Progressing     Problem: Prexisting or High Potential for Compromised Skin Integrity  Goal: Skin integrity is maintained or improved  Description: INTERVENTIONS:  - Identify patients at risk for skin breakdown  - Assess and monitor skin integrity  - Assess and monitor nutrition and hydration status  - Monitor labs   - Assess for incontinence   - Turn and reposition patient  - Assist with mobility/ambulation  - Relieve pressure over bony prominences  - Avoid friction and shearing  - Provide appropriate hygiene as needed including keeping skin clean and dry  - Evaluate need for skin moisturizer/barrier cream  - Collaborate with interdisciplinary team   - Patient/family  teaching  - Consider wound care consult   Outcome: Progressing     Problem: Nutrition/Hydration-ADULT  Goal: Nutrient/Hydration intake appropriate for improving, restoring or maintaining nutritional needs  Description: Monitor and assess patient's nutrition/hydration status for malnutrition. Collaborate with interdisciplinary team and initiate plan and interventions as ordered.  Monitor patient's weight and dietary intake as ordered or per policy. Utilize nutrition screening tool and intervene as necessary. Determine patient's food preferences and provide high-protein, high-caloric foods as appropriate.     INTERVENTIONS:  - Monitor oral intake, urinary output, labs, and treatment plans  - Assess nutrition and hydration status and recommend course of action  - Evaluate amount of meals eaten  - Assist patient with eating if necessary   - Allow adequate time for meals  - Recommend/ encourage appropriate diets, oral nutritional supplements, and vitamin/mineral supplements  - Order, calculate, and assess calorie counts as needed  - Recommend, monitor, and adjust tube feedings and TPN/PPN based on assessed needs  - Assess need for intravenous fluids  - Provide specific nutrition/hydration education as appropriate  - Include patient/family/caregiver in decisions related to nutrition  Outcome: Progressing

## 2023-12-20 NOTE — PLAN OF CARE
Problem: PHYSICAL THERAPY ADULT  Goal: Performs mobility at highest level of function for planned discharge setting.  See evaluation for individualized goals.  Description: Treatment/Interventions: Functional transfer training, LE strengthening/ROM, Therapeutic exercise, Endurance training, Cognitive reorientation, Patient/family training, Equipment eval/education, Bed mobility, Gait training, Spoke to nursing          See flowsheet documentation for full assessment, interventions and recommendations.  Outcome: Progressing  Note: Prognosis: Good  Problem List: Decreased strength, Decreased endurance, Impaired balance, Decreased mobility, Decreased coordination, Decreased cognition, Impaired judgement, Decreased safety awareness  Assessment: pt demonstrated improved functional endurance this session, ambulating further distances with use of RW & no LOB or complaints of excessive fatigue afterwards.  Performs all transfers in similar manner to previous session, and only required instructions for controlled descent upon sitting to prevent flopping.  Pt denies complaints associated with orthostatic BP when prompted, which again is consistant with previous trials.  PT POC & d/c recommendations remain appropriate pending LOS, medical stability & requisite assist at d/c which may still be above what family & caregivers can provide at this time.  Barriers to Discharge: Inaccessible home environment, Decreased caregiver support     Rehab Resource Intensity Level, PT: II (Moderate Resource Intensity)    See flowsheet documentation for full assessment.

## 2023-12-20 NOTE — ASSESSMENT & PLAN NOTE
Lab Results   Component Value Date    HGBA1C 8.8 (H) 11/11/2023     Blood Sugar Average: Last 72 hrs:  (P) 215  Home diabetic regimen: 18 units of Toujeo nightly, lispro 6 units 3 times daily before every meal.  Endocrinology has been consulted and appreciate their input.  Lantus is substituted for Toujeo  Humalog 3 units TID w/meals has been added.

## 2023-12-20 NOTE — PROGRESS NOTES
[facility]  Progress Note  Name: Karson You I  MRN: [id number]  Unit/Bed#: [facility]PHP 614-01 I Date of Admission: 12/14/2023   Date of Service: 12/20/2023 I Hospital Day: 6    Assessment/Plan   * Acute metabolic encephalopathy  Assessment & Plan  Patient was noted to have altered mental status while at SNF, he reportedly returned to baseline in the ED  Suspect multifactorial in setting of above. Patient was recently seen by neurology while at [facility] last week and recommended for outpatient EEG  Patient was RR overnight again 12/18-12/19 for AMS  CT head was unremarkable and now back to baseline  Recommend out-patient neurology follow up    Chronic diastolic heart failure (HCC)  Assessment & Plan  Wt Readings from Last 3 Encounters:   12/20/23 82.2 kg (181 lb 3.2 oz)   12/05/23 67.6 kg (149 lb)   11/10/23 68 kg (150 lb)     Patient with an elevated BNP: 666  CXR demonstrates possibility of pulmonary edema.  ECHO in January 2023 demonstrates normal EF (65%) with grade 1 diastolic dysfunction.  Physical exam does not demonstrate evidence of acute volume overload.  Given patient's hypotension, would be hesitant to diurese.    Elevated troponin  Assessment & Plan  Non-MI troponin elevation in the setting of hypotension.  No ischemic changes seen on EKG.  Cardiology has evaluated the patient.  No ischemic work up is warranted at this time.    Hypomagnesemia  Assessment & Plan  Magnesium level:  1.7  Replete and monitor.    Orthostatic hypotension  Assessment & Plan  Chronic, in the setting of adrenal insufficiency and autonomic dysfunction.  Continue hydrocortisone and fludrocortisone, doses of which are increased x2 days then resume home regimen.  Continue midodrine 3 times daily (home dose)  Encourage use of compression stockings and abdominal binder.    Anemia of chronic disease  Assessment & Plan  Hemoglobin at baseline 10-11.  Continue to monitor and transfuse if less than 7    T1DM  (type 1 diabetes mellitus) ([facility])  Assessment & Plan  Lab Results   Component Value Date    HGBA1C 8.8 (H) 11/11/2023     Blood Sugar Average: Last 72 hrs:  (P) 215  Home diabetic regimen: 18 units of Toujeo nightly, lispro 6 units 3 times daily before every meal.  Endocrinology has been consulted and appreciate their input.  Lantus is substituted for Toujeo  Humalog 3 units TID w/meals has been added.    Paroxysmal atrial fibrillation ([facility])  Assessment & Plan  Not on rate control medications due to chronic hypotension  Continue Eliquis for anticoagulation      History of CVA (cerebrovascular accident)  Assessment & Plan  Continue aspirin and statin    Adrenal insufficiency ([facility])  Assessment & Plan  Home regimen: hydrocortisone 20 mg in AM and 15 mg in PM along with fludrocortisone 0.2 mg daily and sodium chloride tablets 1 mg TID.  Endocrinology consulted and recommended 2 days of increased doses of hydrocortisone (40 mg in AM and 30 mg in PM).  Cardiology consulted and recommend compression stockings along with abdominal binder.    Pneumonia-resolved as of 12/20/2023  Assessment & Plan  CXR demonstrates bilateral perihilar and basilar opacities with slight worsening on the right side.  Procalcitonin was elevated but has significantly improved therefore antibiotics have been discontinued.  Leukocytosis resolved.      VTE Pharmacologic Prophylaxis: VTE Score: 10 High Risk (Score >/= 5) - Pharmacological DVT Prophylaxis Ordered: apixaban (Eliquis). Sequential Compression Devices Ordered.    Mobility:   Basic Mobility Inpatient Raw Score: 19  JH-HLM Goal: 6: Walk 10 steps or more  JH-HLM Achieved: 7: Walk 25 feet or more  HLM Goal NOT achieved. Continue with multidisciplinary rounding and encourage appropriate mobility to improve upon HLM goals.    Patient Centered Rounds: I performed bedside rounds with nursing staff today.   Discussions with Specialists or Other Care Team Provider: None    Education and Discussions  with Family / Patient: Updated  (daughter) via phone.    Total Time Spent on Date of Encounter in care of patient:  mins. This time was spent on one or more of the following: performing physical exam; counseling and coordination of care; obtaining or reviewing history; documenting in the medical record; reviewing/ordering tests, medications or procedures; communicating with other healthcare professionals and discussing with patient's family/caregivers.    Current Length of Stay: 6 day(s)  Current Patient Status: Inpatient   Certification Statement: The patient will continue to require additional inpatient hospital stay due to sugar control and rehab placement.  Discharge Plan: Anticipate discharge tomorrow to rehab facility.  [facility]    Code Status: Level 1 - Full Code    Subjective:   Patient is sitting up at the bedside and eating lunch. He denies any peripheral swelling, increased abdominal girth or worsening SOB.  Daughter had some concerns whether he may be slightly volume overloaded.    Objective:     Vitals:   Temp (24hrs), Av.2 °F (36.8 °C), Min:97.7 °F (36.5 °C), Max:98.8 °F (37.1 °C)    Temp:  [97.7 °F (36.5 °C)-98.8 °F (37.1 °C)] 97.7 °F (36.5 °C)  HR:  [63-83] 68  Resp:  [16-19] 19  BP: (139-167)/(60-89) 140/60  SpO2:  [97 %-100 %] 99 %  Body mass index is 31.1 kg/m².     Input and Output Summary (last 24 hours):     Intake/Output Summary (Last 24 hours) at 2023 1455  Last data filed at 2023 1324  Gross per 24 hour   Intake 2260 ml   Output 2400 ml   Net -140 ml       Physical Exam:   Physical Exam  Vitals and nursing note reviewed.   Constitutional:       General: He is not in acute distress.     Appearance: He is well-developed.   HENT:      Head: Normocephalic and atraumatic.   Eyes:      Conjunctiva/sclera: Conjunctivae normal.   Cardiovascular:      Rate and Rhythm: Normal rate and regular rhythm.      Heart sounds: No murmur heard.  Pulmonary:      Effort:  Pulmonary effort is normal. No respiratory distress.      Breath sounds: Normal breath sounds.   Abdominal:      Palpations: Abdomen is soft.      Tenderness: There is no abdominal tenderness.   Musculoskeletal:         General: No swelling.      Cervical back: Neck supple.   Skin:     General: Skin is warm and dry.   Neurological:      Mental Status: He is alert.   Psychiatric:         Mood and Affect: Mood normal.        Additional Data:     Labs:  Results from last 7 days   Lab Units 12/20/23  0435 12/18/23  2224 12/18/23  2216   WBC Thousand/uL 6.42   < > 6.38   HEMOGLOBIN g/dL 9.7*   < > 9.4*   I STAT HEMOGLOBIN   --    < >  --    HEMATOCRIT % 29.2*   < > 28.2*   HEMATOCRIT, ISTAT   --    < >  --    PLATELETS Thousands/uL 180   < > 140*   NEUTROS PCT %  --   --  74   LYMPHS PCT %  --   --  13*   LYMPHO PCT % 16  --   --    MONOS PCT %  --   --  11   MONO PCT % 2*  --   --    EOS PCT % 0  --  1    < > = values in this interval not displayed.     Results from last 7 days   Lab Units 12/20/23  0446   SODIUM mmol/L 140   POTASSIUM mmol/L 3.5   CHLORIDE mmol/L 102   CO2 mmol/L 32   BUN mg/dL 13   CREATININE mg/dL 0.88   ANION GAP mmol/L 6   CALCIUM mg/dL 8.2*   ALBUMIN g/dL 3.1*   TOTAL BILIRUBIN mg/dL 0.52   ALK PHOS U/L 67   ALT U/L 19   AST U/L 16   GLUCOSE RANDOM mg/dL 139     Results from last 7 days   Lab Units 12/14/23  0825   INR  1.36*     Results from last 7 days   Lab Units 12/20/23  1058 12/20/23  0723 12/19/23  2057 12/19/23  1704 12/19/23  1228 12/19/23  1040 12/19/23  0821 12/19/23  0632 12/18/23  2206 12/18/23  2108 12/18/23  1631 12/18/23  1131   POC GLUCOSE mg/dl 170* 98 272* 417* 229* 214* 162* 443* 244* 314* 227* 217*         Results from last 7 days   Lab Units 12/19/23  0602 12/18/23  2216 12/15/23  0527 12/14/23  1023 12/14/23  0825   LACTIC ACID mmol/L  --  0.7  --  1.4 2.2*   PROCALCITONIN ng/ml 0.39*  --  2.95*  --  3.05*       Lines/Drains:  Invasive Devices       Peripheral Intravenous  Line  Duration             Peripheral IV 12/14/23 Distal;Left;Upper;Ventral (anterior) Arm 6 days    Peripheral IV 12/18/23 Distal;Dorsal (posterior);Left Forearm 1 day              Drain  Duration             External Urinary Catheter Large 4 days                  Imaging: Reviewed radiology reports from this admission including: chest xray    Recent Cultures (last 7 days):   Results from last 7 days   Lab Units 12/15/23  1119 12/14/23  2341 12/14/23  1816 12/14/23  0825 12/14/23  0824   BLOOD CULTURE   --   --   --  No Growth After 5 Days. No Growth After 5 Days.   LEGIONELLA URINARY ANTIGEN   --  Negative  --   --   --    C DIFF TOXIN B BY PCR  Negative  --  Negative  --   --        Last 24 Hours Medication List:   Current Facility-Administered Medications   Medication Dose Route Frequency Provider Last Rate    acetaminophen  975 mg Oral Q6H PRN Ida Cha, CRNP      albuterol  2 puff Inhalation Q4H PRN Alfie Potts      albuterol  2.5 mg Nebulization Q6H PRN Alfie Potts      apixaban  5 mg Oral BID Ida Cha, CRNP      aspirin  81 mg Oral Daily Ida Cha, CRNP      atorvastatin  10 mg Oral Daily With Dinner Ida Cha, CRNP      calcitriol  0.25 mcg Oral Daily Ida Cha, CRNP      cyanocobalamin  100 mcg Oral Daily Ida Cha, CRNP      ferrous sulfate  325 mg Oral Daily With Breakfast Ida Cha, CRNP      fludrocortisone  0.2 mg Oral Daily Ida Cha, CRNP      hydrocortisone  30 mg Oral QPM Grecia Sherman, DO      hydrocortisone  40 mg Oral QAM Grecia Sherman, DO      insulin glargine  18 Units Subcutaneous HS Alfie Potts      insulin lispro  1-5 Units Subcutaneous TID AC Ida Cha, CRNP      insulin lispro  3 Units Subcutaneous TID With Meals Grecia Sherman, DO      magnesium sulfate  4 g Intravenous Once Carmenza Alonso PA-C 4 g (12/20/23 1247)    midodrine  15 mg Oral TID AC Ida Cha, CRNP      potassium chloride  20 mEq Oral BID Ida Cha,  ZANP      risperiDONE  1 mg Oral BID NACHO Chamberlain      sertraline  100 mg Oral Daily NACHO Chamberlain      sodium chloride  1 g Oral TID NACHO Chamberlain          Today, Patient Was Seen By: Carmenza Alonso PA-C    **Please Note: This note may have been constructed using a voice recognition system.**

## 2023-12-21 VITALS
WEIGHT: 181.2 LBS | RESPIRATION RATE: 16 BRPM | DIASTOLIC BLOOD PRESSURE: 88 MMHG | HEART RATE: 71 BPM | SYSTOLIC BLOOD PRESSURE: 161 MMHG | TEMPERATURE: 97.6 F | OXYGEN SATURATION: 99 % | BODY MASS INDEX: 31.1 KG/M2

## 2023-12-21 PROBLEM — I95.1 ORTHOSTATIC HYPOTENSION: Status: RESOLVED | Noted: 2022-11-03 | Resolved: 2023-12-21

## 2023-12-21 LAB
ANION GAP SERPL CALCULATED.3IONS-SCNC: 6 MMOL/L
BUN SERPL-MCNC: 13 MG/DL (ref 5–25)
CALCIUM SERPL-MCNC: 8 MG/DL (ref 8.4–10.2)
CHLORIDE SERPL-SCNC: 102 MMOL/L (ref 96–108)
CO2 SERPL-SCNC: 30 MMOL/L (ref 21–32)
CREAT SERPL-MCNC: 0.88 MG/DL (ref 0.6–1.3)
GFR SERPL CREATININE-BSD FRML MDRD: 89 ML/MIN/1.73SQ M
GLUCOSE SERPL-MCNC: 247 MG/DL (ref 65–140)
GLUCOSE SERPL-MCNC: 249 MG/DL (ref 65–140)
GLUCOSE SERPL-MCNC: 347 MG/DL (ref 65–140)
MAGNESIUM SERPL-MCNC: 2 MG/DL (ref 1.9–2.7)
POTASSIUM SERPL-SCNC: 3.6 MMOL/L (ref 3.5–5.3)
SODIUM SERPL-SCNC: 138 MMOL/L (ref 135–147)

## 2023-12-21 PROCEDURE — 80048 BASIC METABOLIC PNL TOTAL CA: CPT | Performed by: PHYSICIAN ASSISTANT

## 2023-12-21 PROCEDURE — 83735 ASSAY OF MAGNESIUM: CPT | Performed by: PHYSICIAN ASSISTANT

## 2023-12-21 PROCEDURE — 94668 MNPJ CHEST WALL SBSQ: CPT

## 2023-12-21 PROCEDURE — 82948 REAGENT STRIP/BLOOD GLUCOSE: CPT

## 2023-12-21 PROCEDURE — 94664 DEMO&/EVAL PT USE INHALER: CPT

## 2023-12-21 PROCEDURE — 99239 HOSP IP/OBS DSCHRG MGMT >30: CPT | Performed by: INTERNAL MEDICINE

## 2023-12-21 RX ORDER — ALBUTEROL SULFATE 2.5 MG/3ML
2.5 SOLUTION RESPIRATORY (INHALATION) EVERY 6 HOURS PRN
Start: 2023-12-21

## 2023-12-21 RX ORDER — HYDROCORTISONE 5 MG/1
TABLET ORAL
Start: 2023-12-22

## 2023-12-21 RX ORDER — INSULIN LISPRO 100 [IU]/ML
3 INJECTION, SOLUTION INTRAVENOUS; SUBCUTANEOUS
Start: 2023-12-21

## 2023-12-21 RX ORDER — ACETAMINOPHEN 325 MG/1
975 TABLET ORAL EVERY 8 HOURS
Start: 2023-12-21

## 2023-12-21 RX ORDER — HYDROCORTISONE 10 MG/1
30 TABLET ORAL EVERY EVENING
Qty: 1 TABLET
Start: 2023-12-21 | End: 2023-12-22

## 2023-12-21 RX ORDER — INSULIN LISPRO 100 [IU]/ML
1-5 INJECTION, SOLUTION INTRAVENOUS; SUBCUTANEOUS
Start: 2023-12-21

## 2023-12-21 RX ORDER — ALBUTEROL SULFATE 90 UG/1
2 AEROSOL, METERED RESPIRATORY (INHALATION) EVERY 4 HOURS PRN
Start: 2023-12-21

## 2023-12-21 RX ADMIN — ASPIRIN 81 MG CHEWABLE TABLET 81 MG: 81 TABLET CHEWABLE at 08:34

## 2023-12-21 RX ADMIN — CALCITRIOL 0.25 MCG: 0.25 CAPSULE, LIQUID FILLED ORAL at 08:34

## 2023-12-21 RX ADMIN — INSULIN LISPRO 3 UNITS: 100 INJECTION, SOLUTION INTRAVENOUS; SUBCUTANEOUS at 12:02

## 2023-12-21 RX ADMIN — FERROUS SULFATE TAB 325 MG (65 MG ELEMENTAL FE) 325 MG: 325 (65 FE) TAB at 08:36

## 2023-12-21 RX ADMIN — RISPERIDONE 1 MG: 1 TABLET ORAL at 08:34

## 2023-12-21 RX ADMIN — VITAM B12 100 MCG: 100 TAB at 08:34

## 2023-12-21 RX ADMIN — FLUDROCORTISONE ACETATE 0.2 MG: 0.1 TABLET ORAL at 08:35

## 2023-12-21 RX ADMIN — INSULIN LISPRO 2 UNITS: 100 INJECTION, SOLUTION INTRAVENOUS; SUBCUTANEOUS at 08:32

## 2023-12-21 RX ADMIN — HYDROCORTISONE 40 MG: 20 TABLET ORAL at 08:35

## 2023-12-21 RX ADMIN — INSULIN LISPRO 3 UNITS: 100 INJECTION, SOLUTION INTRAVENOUS; SUBCUTANEOUS at 12:03

## 2023-12-21 RX ADMIN — SODIUM CHLORIDE 1 G: 1 TABLET ORAL at 08:34

## 2023-12-21 RX ADMIN — MIDODRINE HYDROCHLORIDE 15 MG: 5 TABLET ORAL at 08:36

## 2023-12-21 RX ADMIN — SERTRALINE HYDROCHLORIDE 100 MG: 100 TABLET ORAL at 08:34

## 2023-12-21 RX ADMIN — POTASSIUM CHLORIDE 20 MEQ: 750 TABLET, EXTENDED RELEASE ORAL at 08:34

## 2023-12-21 RX ADMIN — INSULIN LISPRO 3 UNITS: 100 INJECTION, SOLUTION INTRAVENOUS; SUBCUTANEOUS at 08:32

## 2023-12-21 RX ADMIN — APIXABAN 5 MG: 5 TABLET, FILM COATED ORAL at 08:34

## 2023-12-21 NOTE — ASSESSMENT & PLAN NOTE
Patient was noted to have altered mental status while at SNF, he reportedly returned to baseline in the ED. Has had similar events in past per daughter.  Suspect multifactorial in setting of above. Patient was recently seen by neurology while at [facility] last week and recommended for outpatient EEG  Patient was RR overnight again 12/18-12/19 for AMS  CT head was unremarkable and now back to baseline  Recommend out-patient neurology follow up

## 2023-12-21 NOTE — ASSESSMENT & PLAN NOTE
Wt Readings from Last 3 Encounters:   12/20/23 82.2 kg (181 lb 3.2 oz)   12/05/23 67.6 kg (149 lb)   11/10/23 68 kg (150 lb)     Patient with an elevated BNP: 666  CXR demonstrated possibility of pulmonary edema.  ECHO in January 2023 demonstrates normal EF (65%) with grade 1 diastolic dysfunction.  Physical exam does not demonstrate evidence of acute volume overload.  Given patient's hypotension, would be hesitant to diurese--cards in agreement

## 2023-12-21 NOTE — PLAN OF CARE
Problem: PAIN - ADULT  Goal: Verbalizes/displays adequate comfort level or baseline comfort level  Description: Interventions:  - Encourage patient to monitor pain and request assistance  - Assess pain using appropriate pain scale  - Administer analgesics based on type and severity of pain and evaluate response  - Implement non-pharmacological measures as appropriate and evaluate response  - Consider cultural and social influences on pain and pain management  - Notify physician/advanced practitioner if interventions unsuccessful or patient reports new pain  12/21/2023 1355 by Shyla Celeste RN  Outcome: Completed  12/21/2023 0728 by Shyla Celeste RN  Outcome: Progressing     Problem: INFECTION - ADULT  Goal: Absence or prevention of progression during hospitalization  Description: INTERVENTIONS:  - Assess and monitor for signs and symptoms of infection  - Monitor lab/diagnostic results  - Monitor all insertion sites, i.e. indwelling lines, tubes, and drains  - Monitor endotracheal if appropriate and nasal secretions for changes in amount and color  - [place] appropriate cooling/warming therapies per order  - Administer medications as ordered  - Instruct and encourage patient and family to use good hand hygiene technique  - Identify and instruct in appropriate isolation precautions for identified infection/condition  12/21/2023 1355 by Shyla Celeste RN  Outcome: Completed  12/21/2023 0728 by Shyla Celeste RN  Outcome: Progressing  Goal: Absence of fever/infection during neutropenic period  Description: INTERVENTIONS:  - Monitor WBC    12/21/2023 1355 by Shyla Celeste RN  Outcome: Completed  12/21/2023 0728 by Shyla Celeste RN  Outcome: Progressing     Problem: SAFETY ADULT  Goal: Patient will remain free of falls  Description: INTERVENTIONS:  - Educate patient/family on patient safety including physical limitations  - Instruct patient to call for assistance with activity   - Consult OT/PT to assist  with strengthening/mobility   - Keep Call bell within reach  - Keep bed low and locked with side rails adjusted as appropriate  - Keep care items and personal belongings within reach  - Initiate and maintain comfort rounds  - Make Fall Risk Sign visible to staff  - Offer Toileting every 2 Hours, in advance of need  - Initiate/Maintain alarm  - Obtain necessary fall risk management equipment:   - Apply yellow socks and bracelet for high fall risk patients  - Consider moving patient to room near nurses station  12/21/2023 1355 by Shyla Celeste RN  Outcome: Completed  12/21/2023 0728 by Shyla Celeste RN  Outcome: Progressing  Goal: Maintain or return to baseline ADL function  Description: INTERVENTIONS:  -  Assess patient's ability to carry out ADLs; assess patient's baseline for ADL function and identify physical deficits which impact ability to perform ADLs (bathing, care of mouth/teeth, toileting, grooming, dressing, etc.)  - Assess/evaluate cause of self-care deficits   - Assess range of motion  - Assess patient's mobility; develop plan if impaired  - Assess patient's need for assistive devices and provide as appropriate  - Encourage maximum independence but intervene and supervise when necessary  - Involve family in performance of ADLs  - Assess for home care needs following discharge   - Consider OT consult to assist with ADL evaluation and planning for discharge  - Provide patient education as appropriate  12/21/2023 1355 by Shyla Celeste RN  Outcome: Completed  12/21/2023 0728 by Shyla Celeste RN  Outcome: Progressing  Goal: Maintains/Returns to pre admission functional level  Description: INTERVENTIONS:  - Perform AM-PAC 6 Click Basic Mobility/ Daily Activity assessment daily.  - Set and communicate daily mobility goal to care team and patient/family/caregiver.   - Collaborate with rehabilitation services on mobility goals if consulted  - Perform Range of Motion 3 times a day.  - Reposition patient  every 2 hours.  - Dangle patient 3 times a day  - Stand patient 3 times a day  - Ambulate patient 3 times a day  - Out of bed to chair 3 times a day   - Out of bed for meals 3 times a day  - Out of bed for toileting  - Record patient progress and toleration of activity level   12/21/2023 1355 by Shyla Celeste RN  Outcome: Completed  12/21/2023 0728 by Shyla Celeste RN  Outcome: Progressing     Problem: DISCHARGE PLANNING  Goal: Discharge to home or other facility with appropriate resources  Description: INTERVENTIONS:  - Identify barriers to discharge w/patient and caregiver  - Arrange for needed discharge resources and transportation as appropriate  - Identify discharge learning needs (meds, wound care, etc.)  - Arrange for interpretive services to assist at discharge as needed  - Refer to Case Management Department for coordinating discharge planning if the patient needs post-hospital services based on physician/advanced practitioner order or complex needs related to functional status, cognitive ability, or social support system  12/21/2023 1355 by Shyla Celeste RN  Outcome: Completed  12/21/2023 0728 by Shyla Celeste RN  Outcome: Progressing     Problem: Knowledge Deficit  Goal: Patient/family/caregiver demonstrates understanding of disease process, treatment plan, medications, and discharge instructions  Description: Complete learning assessment and assess knowledge base.  Interventions:  - Provide teaching at level of understanding  - Provide teaching via preferred learning methods  12/21/2023 1355 by Shyla Celeste RN  Outcome: Completed  12/21/2023 0728 by Shyla Celeste RN  Outcome: Progressing     Problem: CARDIOVASCULAR - ADULT  Goal: Maintains optimal cardiac output and hemodynamic stability  Description: INTERVENTIONS:  - Monitor I/O, vital signs and rhythm  - Monitor for S/S and trends of decreased cardiac output  - Administer and titrate ordered vasoactive medications to optimize hemodynamic  stability  - Assess quality of pulses, skin color and temperature  - Assess for signs of decreased coronary artery perfusion  - Instruct patient to report change in severity of symptoms  12/21/2023 1355 by Shyla Celeste RN  Outcome: Completed  12/21/2023 0728 by Shyla Celeste RN  Outcome: Progressing     Problem: GASTROINTESTINAL - ADULT  Goal: Maintains or returns to baseline bowel function  Description: INTERVENTIONS:  - Assess bowel function  - Encourage oral fluids to ensure adequate hydration  - Administer IV fluids if ordered to ensure adequate hydration  - Administer ordered medications as needed  - Encourage mobilization and activity  - Consider nutritional services referral to assist patient with adequate nutrition and appropriate food choices  12/21/2023 1355 by Shyla Celeste RN  Outcome: Completed  12/21/2023 0728 by Shyla Celeste RN  Outcome: Progressing     Problem: GENITOURINARY - ADULT  Goal: Maintains or returns to baseline urinary function  Description: INTERVENTIONS:  - Assess urinary function  - Encourage oral fluids to ensure adequate hydration if ordered  - Administer IV fluids as ordered to ensure adequate hydration  - Administer ordered medications as needed  - Offer frequent toileting  - Follow urinary retention protocol if ordered  12/21/2023 1355 by Shyla Celeste RN  Outcome: Completed  12/21/2023 0728 by Shyla Celeste RN  Outcome: Progressing  Goal: Absence of urinary retention  Description: INTERVENTIONS:  - Assess patient’s ability to void and empty bladder  - Monitor I/O  - Bladder scan as needed  - Discuss with physician/AP medications to alleviate retention as needed  - Discuss catheterization for long term situations as appropriate  12/21/2023 1355 by Shyla Celeste RN  Outcome: Completed  12/21/2023 0728 by Shyla Celeste RN  Outcome: Progressing     Problem: METABOLIC, FLUID AND ELECTROLYTES - ADULT  Goal: Fluid balance maintained  Description: INTERVENTIONS:  -  Monitor labs   - Monitor I/O and WT  - Instruct patient on fluid and nutrition as appropriate  - Assess for signs & symptoms of volume excess or deficit  12/21/2023 1355 by Shyla Celeste RN  Outcome: Completed  12/21/2023 0728 by Shyla Celeste RN  Outcome: Progressing  Goal: Glucose maintained within target range  Description: INTERVENTIONS:  - Monitor Blood Glucose as ordered  - Assess for signs and symptoms of hyperglycemia and hypoglycemia  - Administer ordered medications to maintain glucose within target range  - Assess nutritional intake and initiate nutrition service referral as needed  12/21/2023 1355 by Shyla Celeste RN  Outcome: Completed  12/21/2023 0728 by Shyla Celeste RN  Outcome: Progressing     Problem: Prexisting or High Potential for Compromised Skin Integrity  Goal: Skin integrity is maintained or improved  Description: INTERVENTIONS:  - Identify patients at risk for skin breakdown  - Assess and monitor skin integrity  - Assess and monitor nutrition and hydration status  - Monitor labs   - Assess for incontinence   - Turn and reposition patient  - Assist with mobility/ambulation  - Relieve pressure over bony prominences  - Avoid friction and shearing  - Provide appropriate hygiene as needed including keeping skin clean and dry  - Evaluate need for skin moisturizer/barrier cream  - Collaborate with interdisciplinary team   - Patient/family teaching  - Consider wound care consult   12/21/2023 1355 by Shyla Celeste RN  Outcome: Completed  12/21/2023 0728 by Shyla Celeste RN  Outcome: Progressing     Problem: Nutrition/Hydration-ADULT  Goal: Nutrient/Hydration intake appropriate for improving, restoring or maintaining nutritional needs  Description: Monitor and assess patient's nutrition/hydration status for malnutrition. Collaborate with interdisciplinary team and initiate plan and interventions as ordered.  Monitor patient's weight and dietary intake as ordered or per policy. Utilize  nutrition screening tool and intervene as necessary. Determine patient's food preferences and provide high-protein, high-caloric foods as appropriate.     INTERVENTIONS:  - Monitor oral intake, urinary output, labs, and treatment plans  - Assess nutrition and hydration status and recommend course of action  - Evaluate amount of meals eaten  - Assist patient with eating if necessary   - Allow adequate time for meals  - Recommend/ encourage appropriate diets, oral nutritional supplements, and vitamin/mineral supplements  - Order, calculate, and assess calorie counts as needed  - Recommend, monitor, and adjust tube feedings and TPN/PPN based on assessed needs  - Assess need for intravenous fluids  - Provide specific nutrition/hydration education as appropriate  - Include patient/family/caregiver in decisions related to nutrition  12/21/2023 1355 by Shyla Celeste RN  Outcome: Completed  12/21/2023 0728 by Shyla Celeste RN  Outcome: Progressing

## 2023-12-21 NOTE — PLAN OF CARE
Problem: PAIN - ADULT  Goal: Verbalizes/displays adequate comfort level or baseline comfort level  Description: Interventions:  - Encourage patient to monitor pain and request assistance  - Assess pain using appropriate pain scale  - Administer analgesics based on type and severity of pain and evaluate response  - Implement non-pharmacological measures as appropriate and evaluate response  - Consider cultural and social influences on pain and pain management  - Notify physician/advanced practitioner if interventions unsuccessful or patient reports new pain  Outcome: Progressing     Problem: INFECTION - ADULT  Goal: Absence or prevention of progression during hospitalization  Description: INTERVENTIONS:  - Assess and monitor for signs and symptoms of infection  - Monitor lab/diagnostic results  - Monitor all insertion sites, i.e. indwelling lines, tubes, and drains  - Monitor endotracheal if appropriate and nasal secretions for changes in amount and color  - [place] appropriate cooling/warming therapies per order  - Administer medications as ordered  - Instruct and encourage patient and family to use good hand hygiene technique  - Identify and instruct in appropriate isolation precautions for identified infection/condition  Outcome: Progressing  Goal: Absence of fever/infection during neutropenic period  Description: INTERVENTIONS:  - Monitor WBC    Outcome: Progressing     Problem: SAFETY ADULT  Goal: Patient will remain free of falls  Description: INTERVENTIONS:  - Educate patient/family on patient safety including physical limitations  - Instruct patient to call for assistance with activity   - Consult OT/PT to assist with strengthening/mobility   - Keep Call bell within reach  - Keep bed low and locked with side rails adjusted as appropriate  - Keep care items and personal belongings within reach  - Initiate and maintain comfort rounds  - Make Fall Risk Sign visible to staff  - Offer Toileting every 2 Hours,  in advance of need  - Initiate/Maintain alarm  - Obtain necessary fall risk management equipment:   - Apply yellow socks and bracelet for high fall risk patients  - Consider moving patient to room near nurses station  Outcome: Progressing  Goal: Maintain or return to baseline ADL function  Description: INTERVENTIONS:  -  Assess patient's ability to carry out ADLs; assess patient's baseline for ADL function and identify physical deficits which impact ability to perform ADLs (bathing, care of mouth/teeth, toileting, grooming, dressing, etc.)  - Assess/evaluate cause of self-care deficits   - Assess range of motion  - Assess patient's mobility; develop plan if impaired  - Assess patient's need for assistive devices and provide as appropriate  - Encourage maximum independence but intervene and supervise when necessary  - Involve family in performance of ADLs  - Assess for home care needs following discharge   - Consider OT consult to assist with ADL evaluation and planning for discharge  - Provide patient education as appropriate  Outcome: Progressing  Goal: Maintains/Returns to pre admission functional level  Description: INTERVENTIONS:  - Perform AM-PAC 6 Click Basic Mobility/ Daily Activity assessment daily.  - Set and communicate daily mobility goal to care team and patient/family/caregiver.   - Collaborate with rehabilitation services on mobility goals if consulted  - Perform Range of Motion 3 times a day.  - Reposition patient every 2 hours.  - Dangle patient 3 times a day  - Stand patient 3 times a day  - Ambulate patient 3 times a day  - Out of bed to chair 3 times a day   - Out of bed for meals 3 times a day  - Out of bed for toileting  - Record patient progress and toleration of activity level   Outcome: Progressing     Problem: DISCHARGE PLANNING  Goal: Discharge to home or other facility with appropriate resources  Description: INTERVENTIONS:  - Identify barriers to discharge w/patient and caregiver  -  Arrange for needed discharge resources and transportation as appropriate  - Identify discharge learning needs (meds, wound care, etc.)  - Arrange for interpretive services to assist at discharge as needed  - Refer to Case Management Department for coordinating discharge planning if the patient needs post-hospital services based on physician/advanced practitioner order or complex needs related to functional status, cognitive ability, or social support system  Outcome: Progressing     Problem: Knowledge Deficit  Goal: Patient/family/caregiver demonstrates understanding of disease process, treatment plan, medications, and discharge instructions  Description: Complete learning assessment and assess knowledge base.  Interventions:  - Provide teaching at level of understanding  - Provide teaching via preferred learning methods  Outcome: Progressing     Problem: CARDIOVASCULAR - ADULT  Goal: Maintains optimal cardiac output and hemodynamic stability  Description: INTERVENTIONS:  - Monitor I/O, vital signs and rhythm  - Monitor for S/S and trends of decreased cardiac output  - Administer and titrate ordered vasoactive medications to optimize hemodynamic stability  - Assess quality of pulses, skin color and temperature  - Assess for signs of decreased coronary artery perfusion  - Instruct patient to report change in severity of symptoms  Outcome: Progressing     Problem: GASTROINTESTINAL - ADULT  Goal: Maintains or returns to baseline bowel function  Description: INTERVENTIONS:  - Assess bowel function  - Encourage oral fluids to ensure adequate hydration  - Administer IV fluids if ordered to ensure adequate hydration  - Administer ordered medications as needed  - Encourage mobilization and activity  - Consider nutritional services referral to assist patient with adequate nutrition and appropriate food choices  Outcome: Progressing     Problem: GENITOURINARY - ADULT  Goal: Maintains or returns to baseline urinary  function  Description: INTERVENTIONS:  - Assess urinary function  - Encourage oral fluids to ensure adequate hydration if ordered  - Administer IV fluids as ordered to ensure adequate hydration  - Administer ordered medications as needed  - Offer frequent toileting  - Follow urinary retention protocol if ordered  Outcome: Progressing  Goal: Absence of urinary retention  Description: INTERVENTIONS:  - Assess patient’s ability to void and empty bladder  - Monitor I/O  - Bladder scan as needed  - Discuss with physician/AP medications to alleviate retention as needed  - Discuss catheterization for long term situations as appropriate  Outcome: Progressing     Problem: METABOLIC, FLUID AND ELECTROLYTES - ADULT  Goal: Fluid balance maintained  Description: INTERVENTIONS:  - Monitor labs   - Monitor I/O and WT  - Instruct patient on fluid and nutrition as appropriate  - Assess for signs & symptoms of volume excess or deficit  Outcome: Progressing  Goal: Glucose maintained within target range  Description: INTERVENTIONS:  - Monitor Blood Glucose as ordered  - Assess for signs and symptoms of hyperglycemia and hypoglycemia  - Administer ordered medications to maintain glucose within target range  - Assess nutritional intake and initiate nutrition service referral as needed  Outcome: Progressing     Problem: Prexisting or High Potential for Compromised Skin Integrity  Goal: Skin integrity is maintained or improved  Description: INTERVENTIONS:  - Identify patients at risk for skin breakdown  - Assess and monitor skin integrity  - Assess and monitor nutrition and hydration status  - Monitor labs   - Assess for incontinence   - Turn and reposition patient  - Assist with mobility/ambulation  - Relieve pressure over bony prominences  - Avoid friction and shearing  - Provide appropriate hygiene as needed including keeping skin clean and dry  - Evaluate need for skin moisturizer/barrier cream  - Collaborate with interdisciplinary  team   - Patient/family teaching  - Consider wound care consult   Outcome: Progressing     Problem: Nutrition/Hydration-ADULT  Goal: Nutrient/Hydration intake appropriate for improving, restoring or maintaining nutritional needs  Description: Monitor and assess patient's nutrition/hydration status for malnutrition. Collaborate with interdisciplinary team and initiate plan and interventions as ordered.  Monitor patient's weight and dietary intake as ordered or per policy. Utilize nutrition screening tool and intervene as necessary. Determine patient's food preferences and provide high-protein, high-caloric foods as appropriate.     INTERVENTIONS:  - Monitor oral intake, urinary output, labs, and treatment plans  - Assess nutrition and hydration status and recommend course of action  - Evaluate amount of meals eaten  - Assist patient with eating if necessary   - Allow adequate time for meals  - Recommend/ encourage appropriate diets, oral nutritional supplements, and vitamin/mineral supplements  - Order, calculate, and assess calorie counts as needed  - Recommend, monitor, and adjust tube feedings and TPN/PPN based on assessed needs  - Assess need for intravenous fluids  - Provide specific nutrition/hydration education as appropriate  - Include patient/family/caregiver in decisions related to nutrition  Outcome: Progressing

## 2023-12-21 NOTE — ASSESSMENT & PLAN NOTE
Noted on EKG during rapid. Repeat improved   On zoloft but that should not prolong QTC  DC PRN Zofran

## 2023-12-21 NOTE — ASSESSMENT & PLAN NOTE
Patient found to have systolic blood pressure in the 70s while in the ED.  Received 1 L IV bolus with no improvement. He was then given midodrine, hydrocortisone IV and Levophed. Pressor was weaned after 1 hour with continued stable vitals. Per chart review has been an issue several times during prior hospitalizations in past.  Initial lactate 2.2, normalized with IVF  Even though patient has no respiratory symptoms chest x-ray was concerning for PNA and with elevated procalcitonin, treated as such below  Overall improved   Daughter requested cardiology evaluation given negative neuro w/u while at [facility] with recurrent events

## 2023-12-21 NOTE — CASE MANAGEMENT
Case Management Discharge Planning Note    Patient name Karson You  Location [facility] 614/[facility] 614-01 MRN [id number]  : 1955 Date 2023       Current Admission Date: 2023  Current Admission Diagnosis:Acute metabolic encephalopathy   Patient Active Problem List    Diagnosis Date Noted    Chronic diastolic heart failure (HCC) 2023    Prolonged Q-T interval on ECG 2023    Shock (HCC) 2023    Elevated troponin 2023    Acute metabolic encephalopathy 2023    Hypomagnesemia 2023    Hypotension/syncope 11/10/2023    Fall 11/10/2023    Adrenal insufficiency (HCC) 11/10/2023    Orthostatic hypotension 11/10/2023    Type 1 diabetes mellitus with hypoglycemia (HCC) 11/10/2023    History of CVA (cerebrovascular accident) 11/10/2023    Paroxysmal atrial fibrillation (HCC) 11/10/2023    Spells of decreased attentiveness 2023    Cerebrovascular accident (CVA) (HCC) 2023    Iron deficiency anemia, unspecified 2023    Syncope 2023    Unspecified protein-calorie malnutrition (HCC) 2022    Unintentional weight loss 2022    Type 2 MI (myocardial infarction) (HCC) 2022    Orthostatic hypotension 2022    Recurrent hypoglycemia 10/19/2022    T1DM (type 1 diabetes mellitus) ([facility]) 10/19/2022    Paroxysmal atrial fibrillation  10/19/2022    History of stroke 10/19/2022    Anemia of chronic disease 10/19/2022    Psychiatric disorder 10/19/2022      LOS (days): 7  Geometric Mean LOS (GMLOS) (days): 5  Days to GMLOS:-2     OBJECTIVE:  Risk of Unplanned Readmission Score: 37.23         Current admission status: Inpatient   Preferred Pharmacy:   [id number] - [place] - [address] [place] [address]  [place] [address]  Phone: [phone] Fax: [phone]    [facility] [place] [place] - [address]  [address]  [place] [address]  Phone: [phone] Fax:  [phone]    [id number] - [place] - [address]  [address] [place]  [place] [address]  Phone: [phone] Fax: [phone]    Primary Care Provider: Tree Spence MD    Primary Insurance: [employer] [facility]  Secondary Insurance:     DISCHARGE DETAILS:    Discharge planning discussed with:: Daughter Ofelia     Other Referral/Resources/Interventions Provided:  Referral Comments: [facility] able to accept patient today, daughter and patient in agreement with DCP    Transport at Discharge : Stretcher van  Dispatcher Contacted: Yes  Number/Name of Dispatcher: [employer]  Transported by (Company and Unit #): [employer] mobility stretcher  ETA of Transport (Date): 12/21/23  ETA of Transport (Time): 1400     IMM Given (Date):: 12/21/23  IMM Given to:: Family     Accepting Facility Name, City & State : [facility]  Receiving Facility/Agency Phone Number: [phone]- [facility]  Facility/Agency Fax Number: [phone]

## 2023-12-21 NOTE — ASSESSMENT & PLAN NOTE
Baseline creatinine 0.8-1, creatinine on admission 1.3  Multifactorial in the setting of shock and poor oral intake?  Initially got IVF and then got lasix IV x1, then on PO lasix--now off all, cards agrees

## 2023-12-21 NOTE — PLAN OF CARE
Problem: PAIN - ADULT  Goal: Verbalizes/displays adequate comfort level or baseline comfort level  Description: Interventions:  - Encourage patient to monitor pain and request assistance  - Assess pain using appropriate pain scale  - Administer analgesics based on type and severity of pain and evaluate response  - Implement non-pharmacological measures as appropriate and evaluate response  - Consider cultural and social influences on pain and pain management  - Notify physician/advanced practitioner if interventions unsuccessful or patient reports new pain  Outcome: Progressing     Problem: INFECTION - ADULT  Goal: Absence or prevention of progression during hospitalization  Description: INTERVENTIONS:  - Assess and monitor for signs and symptoms of infection  - Monitor lab/diagnostic results  - Monitor all insertion sites, i.e. indwelling lines, tubes, and drains  - Monitor endotracheal if appropriate and nasal secretions for changes in amount and color  - [place] appropriate cooling/warming therapies per order  - Administer medications as ordered  - Instruct and encourage patient and family to use good hand hygiene technique  - Identify and instruct in appropriate isolation precautions for identified infection/condition  Outcome: Progressing  Goal: Absence of fever/infection during neutropenic period  Description: INTERVENTIONS:  - Monitor WBC    Outcome: Progressing     Problem: SAFETY ADULT  Goal: Patient will remain free of falls  Description: INTERVENTIONS:  - Educate patient/family on patient safety including physical limitations  - Instruct patient to call for assistance with activity   - Consult OT/PT to assist with strengthening/mobility   - Keep Call bell within reach  - Keep bed low and locked with side rails adjusted as appropriate  - Keep care items and personal belongings within reach  - Initiate and maintain comfort rounds  - Make Fall Risk Sign visible to staff  - Offer Toileting every 2 Hours,  in advance of need  - Initiate/Maintain bed alarm  - Obtain necessary fall risk management equipment:   - Apply yellow socks and bracelet for high fall risk patients  - Consider moving patient to room near nurses station  Outcome: Progressing  Goal: Maintain or return to baseline ADL function  Description: INTERVENTIONS:  -  Assess patient's ability to carry out ADLs; assess patient's baseline for ADL function and identify physical deficits which impact ability to perform ADLs (bathing, care of mouth/teeth, toileting, grooming, dressing, etc.)  - Assess/evaluate cause of self-care deficits   - Assess range of motion  - Assess patient's mobility; develop plan if impaired  - Assess patient's need for assistive devices and provide as appropriate  - Encourage maximum independence but intervene and supervise when necessary  - Involve family in performance of ADLs  - Assess for home care needs following discharge   - Consider OT consult to assist with ADL evaluation and planning for discharge  - Provide patient education as appropriate  Outcome: Progressing  Goal: Maintains/Returns to pre admission functional level  Description: INTERVENTIONS:  - Perform AM-PAC 6 Click Basic Mobility/ Daily Activity assessment daily.  - Set and communicate daily mobility goal to care team and patient/family/caregiver.   - Collaborate with rehabilitation services on mobility goals if consulted  - Perform Range of Motion 3 times a day.  - Reposition patient every 2 hours.  - Dangle patient 3 times a day  - Stand patient 3 times a day  - Ambulate patient 3 times a day  - Out of bed to chair 3 times a day   - Out of bed for meals 3 times a day  - Out of bed for toileting  - Record patient progress and toleration of activity level   Outcome: Progressing     Problem: DISCHARGE PLANNING  Goal: Discharge to home or other facility with appropriate resources  Description: INTERVENTIONS:  - Identify barriers to discharge w/patient and caregiver  -  Arrange for needed discharge resources and transportation as appropriate  - Identify discharge learning needs (meds, wound care, etc.)  - Arrange for interpretive services to assist at discharge as needed  - Refer to Case Management Department for coordinating discharge planning if the patient needs post-hospital services based on physician/advanced practitioner order or complex needs related to functional status, cognitive ability, or social support system  Outcome: Progressing     Problem: Knowledge Deficit  Goal: Patient/family/caregiver demonstrates understanding of disease process, treatment plan, medications, and discharge instructions  Description: Complete learning assessment and assess knowledge base.  Interventions:  - Provide teaching at level of understanding  - Provide teaching via preferred learning methods  Outcome: Progressing     Problem: CARDIOVASCULAR - ADULT  Goal: Maintains optimal cardiac output and hemodynamic stability  Description: INTERVENTIONS:  - Monitor I/O, vital signs and rhythm  - Monitor for S/S and trends of decreased cardiac output  - Administer and titrate ordered vasoactive medications to optimize hemodynamic stability  - Assess quality of pulses, skin color and temperature  - Assess for signs of decreased coronary artery perfusion  - Instruct patient to report change in severity of symptoms  Outcome: Progressing     Problem: GASTROINTESTINAL - ADULT  Goal: Maintains or returns to baseline bowel function  Description: INTERVENTIONS:  - Assess bowel function  - Encourage oral fluids to ensure adequate hydration  - Administer IV fluids if ordered to ensure adequate hydration  - Administer ordered medications as needed  - Encourage mobilization and activity  - Consider nutritional services referral to assist patient with adequate nutrition and appropriate food choices  Outcome: Progressing     Problem: GENITOURINARY - ADULT  Goal: Maintains or returns to baseline urinary  function  Description: INTERVENTIONS:  - Assess urinary function  - Encourage oral fluids to ensure adequate hydration if ordered  - Administer IV fluids as ordered to ensure adequate hydration  - Administer ordered medications as needed  - Offer frequent toileting  - Follow urinary retention protocol if ordered  Outcome: Progressing  Goal: Absence of urinary retention  Description: INTERVENTIONS:  - Assess patient’s ability to void and empty bladder  - Monitor I/O  - Bladder scan as needed  - Discuss with physician/AP medications to alleviate retention as needed  - Discuss catheterization for long term situations as appropriate  Outcome: Progressing     Problem: METABOLIC, FLUID AND ELECTROLYTES - ADULT  Goal: Fluid balance maintained  Description: INTERVENTIONS:  - Monitor labs   - Monitor I/O and WT  - Instruct patient on fluid and nutrition as appropriate  - Assess for signs & symptoms of volume excess or deficit  Outcome: Progressing  Goal: Glucose maintained within target range  Description: INTERVENTIONS:  - Monitor Blood Glucose as ordered  - Assess for signs and symptoms of hyperglycemia and hypoglycemia  - Administer ordered medications to maintain glucose within target range  - Assess nutritional intake and initiate nutrition service referral as needed  Outcome: Progressing     Problem: Prexisting or High Potential for Compromised Skin Integrity  Goal: Skin integrity is maintained or improved  Description: INTERVENTIONS:  - Identify patients at risk for skin breakdown  - Assess and monitor skin integrity  - Assess and monitor nutrition and hydration status  - Monitor labs   - Assess for incontinence   - Turn and reposition patient  - Assist with mobility/ambulation  - Relieve pressure over bony prominences  - Avoid friction and shearing  - Provide appropriate hygiene as needed including keeping skin clean and dry  - Evaluate need for skin moisturizer/barrier cream  - Collaborate with interdisciplinary  team   - Patient/family teaching  - Consider wound care consult   Outcome: Progressing     Problem: Nutrition/Hydration-ADULT  Goal: Nutrient/Hydration intake appropriate for improving, restoring or maintaining nutritional needs  Description: Monitor and assess patient's nutrition/hydration status for malnutrition. Collaborate with interdisciplinary team and initiate plan and interventions as ordered.  Monitor patient's weight and dietary intake as ordered or per policy. Utilize nutrition screening tool and intervene as necessary. Determine patient's food preferences and provide high-protein, high-caloric foods as appropriate.     INTERVENTIONS:  - Monitor oral intake, urinary output, labs, and treatment plans  - Assess nutrition and hydration status and recommend course of action  - Evaluate amount of meals eaten  - Assist patient with eating if necessary   - Allow adequate time for meals  - Recommend/ encourage appropriate diets, oral nutritional supplements, and vitamin/mineral supplements  - Order, calculate, and assess calorie counts as needed  - Recommend, monitor, and adjust tube feedings and TPN/PPN based on assessed needs  - Assess need for intravenous fluids  - Provide specific nutrition/hydration education as appropriate  - Include patient/family/caregiver in decisions related to nutrition  Outcome: Progressing

## 2023-12-21 NOTE — DISCHARGE SUMMARY
[facility]  Discharge- Karson You 1955, 68 y.o. male MRN: [id number]  [facility] Encounter: [id number]  Primary Care Provider: Tree Spence MD   Date and time admitted to hospital: 12/14/2023  8:10 AM    * Acute metabolic encephalopathy  Assessment & Plan  Patient was noted to have altered mental status while at [facility], he reportedly returned to baseline in the ED. Has had similar events in past per daughter.  Suspect multifactorial in setting of above. Patient was recently seen by neurology while at [facility] last week and recommended for outpatient EEG  Patient was RR overnight again 12/18-12/19 for AMS  CT head was unremarkable and now back to baseline  Recommend out-patient neurology follow up    Chronic diastolic heart failure (HCC)  Assessment & Plan  Wt Readings from Last 3 Encounters:   12/20/23 82.2 kg (181 lb 3.2 oz)   12/05/23 67.6 kg (149 lb)   11/10/23 68 kg (150 lb)     Patient with an elevated BNP: 666  CXR demonstrated possibility of pulmonary edema.  ECHO in January 2023 demonstrates normal EF (65%) with grade 1 diastolic dysfunction.  Physical exam does not demonstrate evidence of acute volume overload.  Given patient's hypotension, would be hesitant to diurese--cards in agreement     Shock (HCC)  Assessment & Plan  Patient found to have systolic blood pressure in the 70s while in the ED.  Received 1 L IV bolus with no improvement. He was then given midodrine, hydrocortisone IV and Levophed. Pressor was weaned after 1 hour with continued stable vitals. Per chart review has been an issue several times during prior hospitalizations in past.  Initial lactate 2.2, normalized with IVF  Even though patient has no respiratory symptoms chest x-ray was concerning for PNA and with elevated procalcitonin, treated as such below  Overall improved   Daughter requested cardiology evaluation given negative neuro w/u while at [facility] with recurrent events     T1DM  (type 1 diabetes mellitus) ([facility])  Assessment & Plan  Lab Results   Component Value Date    HGBA1C 8.8 (H) 11/11/2023     Blood Sugar Average: Last 72 hrs:  (P) 215  Home diabetic regimen: 18 units of Toujeo nightly, lispro 6 units 3 times daily before every meal.  Endocrinology has been consulted and appreciate their input.  Lantus is substituted for Toujeo  Humalog 3 units TID w/meals has been added.    Adrenal insufficiency ([facility])  Assessment & Plan  Home regimen: hydrocortisone 20 mg in AM and 15 mg in PM along with fludrocortisone 0.2 mg daily and sodium chloride tablets 1 mg TID.  Endocrinology consulted and recommended 2 days of increased doses of hydrocortisone (40 mg in AM and 30 mg in PM), resume home regimen on 12/22  Cardiology consulted and recommend compression stockings along with abdominal binder.    Pneumonia-resolved as of 12/20/2023  Assessment & Plan  CXR demonstrates bilateral perihilar and basilar opacities with slight worsening on the right side.  Procalcitonin was elevated but has significantly improved therefore antibiotics have been discontinued.  Leukocytosis resolved.    BERNA (acute kidney injury) ([facility])-resolved as of 12/19/2023  Assessment & Plan  Baseline creatinine 0.8-1, creatinine on admission 1.3  Multifactorial in the setting of shock and poor oral intake?  Initially got IVF and then got lasix IV x1, then on PO lasix--now off all, cards agrees      Orthostatic hypotension-resolved as of 12/21/2023  Assessment & Plan  Chronic, in the setting of adrenal insufficiency and autonomic dysfunction.  Continue hydrocortisone and fludrocortisone, doses of which are increased x2 days then resume home regimen.  Continue midodrine 3 times daily (home dose)  Encourage use of compression stockings and abdominal binder.    Prolonged Q-T interval on ECG  Assessment & Plan  Noted on EKG during rapid. Repeat improved   On zoloft but that should not prolong QTC  DC PRN Zofran    Elevated  troponin  Assessment & Plan  Non-MI troponin elevation in the setting of hypotension.  No ischemic changes seen on EKG.  Cardiology has evaluated the patient.  No ischemic work up is warranted at this time.    Hypomagnesemia  Assessment & Plan  Magnesium level:  1.7  Repleted     Anemia of chronic disease  Assessment & Plan  Hemoglobin at baseline 10-11.  Continue to monitor and transfuse if less than 7    Paroxysmal atrial fibrillation (HCC)  Assessment & Plan  Not on rate control medications due to chronic hypotension  Continue Eliquis for anticoagulation      History of CVA (cerebrovascular accident)  Assessment & Plan  Continue aspirin and statin    Diarrhea-resolved as of 12/19/2023  Assessment & Plan  Patient reports more than 4 episodes of loose diarrhea for the last 3 days.  He has had no diarrhea while in the hospital  C. Difficile and stool studies negative       Hypokalemia-resolved as of 12/20/2023  Assessment & Plan  Low, likely in setting of lasix given  Replete aggressively         Medical Problems       Resolved Problems  Date Reviewed: 12/21/2023            Resolved    Orthostatic hypotension 12/21/2023     Resolved by  Carmenza Leblanc PA-C    BERNA (acute kidney injury) (HCC) 12/19/2023     Resolved by  Carmenza Leblanc PA-C    Hypokalemia 12/20/2023     Resolved by  Carmenza Alonso PA-C    Diarrhea 12/19/2023     Resolved by  Carmenza Leblanc PA-C    Pneumonia 12/20/2023     Resolved by  Carmenza Alonso PA-C        Discharging Physician / Practitioner: Carmenza Leblanc PA-C  PCP: Tree Spence MD  Admission Date:   Admission Orders (From admission, onward)       Ordered        12/14/23 1211  INPATIENT ADMISSION  Once                          Discharge Date: 12/21/23    Disposition:    Other: Rehab    Reason for Admission: AMS, low BP    Discharge Diagnoses:   Please see assessment and plan section above for further details regarding discharge diagnoses.     Consultations During Hospital  Stay:  Pharmacy  Endocrinology  Cardiology    Procedures Performed:   CXR 12/19: Bilateral perihilar and basilar opacities with slight worsening of findings on the right-side. These findings remain concerning for pulmonary edema (slightly favored) versus pneumonia. Correlate with clinical scenario.   CT head wo contrast:  No acute intracranial abnormality.  Chronic microangiopathic changes.   CXR: Slight worsening of left perihilar and basilar airspace opacity, suspicious for pneumonia. Development of right perihilar airspace opacity, possibly edema versus pneumonia. Suspected mild pulmonary vascular congestion and possible small subpulmonic left pleural effusion. Correlation with BNP may be helpful.   CXR: Left lingula and lower lobe consolidation, worsened since prior exam. This findings are compatible with progression of pneumonia and suspicious for aspiration. Similar mild patchy right basilar opacities, also likely infectious/inflammatory.   COVID/Flu/RSV: Negative  Stool studies: all negative  Blood cx: negative x 5 days  MRSA nasal swab: neg    Significant Findings / Test Results:   See above     Incidental Findings:   none     Test Results Pending at Discharge (will require follow up):   none     Outpatient Tests Requested:  Neuro f/u  Cards f/u  PCP f/u  Endo f/u    Complications:  none    Hospital Course:      Karson You is a 68 y.o. male patient with PMHx as above who originally presented to the hospital on 12/14/2023 due to fever and altered mental status from SNF.  Patient was also noted to be febrile in transit and Tylenol was given.  Patient had reported multiple episodes of loose bowel movements prior to admission. On arrival patient was found to have systolic blood pressure in the 70s while in the ER and received 1 L IV fluid bolus with no improvement.  He was given his midodrine, IV hydrocortisone and Levophed.  Pressors were able to be weaned after an hour and vitals remained stable.  His initial  lactate was 2.2 and normalized to 1.4.      Etiology of his shock was thought to be multifactorial in the setting of hypovolemia, sepsis and chronic adrenal insufficiency.  On chest x-ray patient was found to have pneumonia and was started on Rocephin/azithromycin and vancomycin was added on 12/16/2023 given ongoing fevers. Other infectious w/u was negative. Patient did have episode of acute metabolic encephalopathy prompting rapid and had CT head which was unremarkable.  He has known chronic orthostatic hypotension and is on hydrocortisone, fludrocortisone and midodrine at home.  He also has known secondary adrenal insufficiency and is on hydrocortisone, 20 mg in the a.m., 15 mg p.m. fludrocortisone 0.2 mg daily as well as salt tabs.     Seen by santosh and cards during admission. Given inc dose steroids. Completed abx therapy. Now back to home dose steroids. Can f/u outpatient with neurology as recommended during prior admission.    Stable for dc back to rehab.     Condition at Discharge: stable    Discharge Day Visit / Exam:   Subjective:  doing fine. No complaints today.   Vitals: Blood Pressure: 161/88 (12/21/23 1039)  Pulse: 71 (12/21/23 1039)  Temperature: 97.6 °F (36.4 °C) (12/21/23 1039)  Temp Source: Oral (12/19/23 1042)  Respirations: 16 (12/21/23 1039)  Weight - Scale: 82.2 kg (181 lb 3.2 oz) (12/20/23 0600)  SpO2: 99 % (12/21/23 1039)  Exam:   Physical Exam  Vitals and nursing note reviewed.   Constitutional:       General: He is not in acute distress.     Comments: On RA    Cardiovascular:      Rate and Rhythm: Normal rate.   Pulmonary:      Effort: No respiratory distress.   Abdominal:      General: There is no distension.      Palpations: Abdomen is soft.      Tenderness: There is no abdominal tenderness.   Musculoskeletal:      Right lower leg: Edema (trace) present.      Left lower leg: Edema (trace) present.   Neurological:      Mental Status: He is oriented to person, place, and time.   Psychiatric:          Mood and Affect: Mood normal.          Discussion with Family: d/w daughter over phone    Medication Adjustments and Discharge Medications:  Discharge Medication List: See after visit summary for reconciled discharge medications.   Medication Dosing Tapers - Please refer to Discharge Medication List for details on any medication dosing tapers (if applicable to patient).   Summary of Medication Adjustments made as a result of this hospitalization: see med rec  Medications being temporarily held (include recommended restart time): see med rec    Wound Care Recommendations:  When applicable, please see wound care section of After Visit Summary.    Instructions for any Catheters / Lines Present at Discharge (including removal date, if applicable): NA    Diet Recommendations at Discharge:  Diet -        Diet Orders   (From admission, onward)                 Start     Ordered    12/21/23 0850  Diet Cardiovascular; Cardiac; Fluid Restriction 2000 ML  Diet effective now        References:    Adult Nutrition Support Algorithm    RD Therapeutic Diet Order Protocol   Question Answer Comment   Diet Type Cardiovascular    Cardiac Cardiac    Other Restriction(s): Fluid Restriction 2000 ML    RD to adjust diet per protocol? Yes        12/21/23 0850                    Mobility at time of Discharge:   Basic Mobility Inpatient Raw Score: 19  JH-HLM Goal: 6: Walk 10 steps or more  JH-HLM Achieved: 6: Walk 10 steps or more  HLM Goal NOT achieved. Continue to encourage mobility in post discharge setting.    Goals of Care Discussions:  Code Status at Discharge: Level 1 - Full Code  Goals of care were not discussed during this admission.    Discharge instructions/Information to patient and family:   See after visit summary section titled Discharge Instructions for information provided to patient and family.      Planned Readmission: no      Discharge Statement:  I spent 34 minutes discharging the patient. This time was spent on  the day of discharge. I had direct contact with the patient on the day of discharge. Greater than 50% of the total time was spent examining patient, answering all patient questions, arranging and discussing plan of care with patient as well as directly providing post-discharge instructions.  Additional time then spent on discharge activities.    **Please Note: This note may have been constructed using a voice recognition system.**

## 2023-12-21 NOTE — ASSESSMENT & PLAN NOTE
Home regimen: hydrocortisone 20 mg in AM and 15 mg in PM along with fludrocortisone 0.2 mg daily and sodium chloride tablets 1 mg TID.  Endocrinology consulted and recommended 2 days of increased doses of hydrocortisone (40 mg in AM and 30 mg in PM), resume home regimen on 12/22  Cardiology consulted and recommend compression stockings along with abdominal binder.

## 2023-12-22 DIAGNOSIS — E10.649 TYPE 1 DIABETES MELLITUS WITH HYPOGLYCEMIA AND WITHOUT COMA (HCC): ICD-10-CM

## 2023-12-22 RX ORDER — ACYCLOVIR 400 MG/1
1 TABLET ORAL
Qty: 9 EACH | Refills: 2 | Status: SHIPPED | OUTPATIENT
Start: 2023-12-22 | End: 2023-12-29 | Stop reason: SDUPTHER

## 2023-12-29 DIAGNOSIS — E10.649 TYPE 1 DIABETES MELLITUS WITH HYPOGLYCEMIA AND WITHOUT COMA (HCC): ICD-10-CM

## 2023-12-29 RX ORDER — ACYCLOVIR 400 MG/1
1 TABLET ORAL
Qty: 9 EACH | Refills: 0 | Status: SHIPPED | OUTPATIENT
Start: 2023-12-29

## 2023-12-29 NOTE — TELEPHONE ENCOUNTER
Patient's daughter called left  stating her father is out of Dexcom G7 sensors. Urgently requesting refill to [facility] on [address]

## 2023-12-31 ENCOUNTER — APPOINTMENT (EMERGENCY)
Dept: RADIOLOGY | Facility: HOSPITAL | Age: 68
End: 2023-12-31
Payer: COMMERCIAL

## 2023-12-31 ENCOUNTER — HOSPITAL ENCOUNTER (EMERGENCY)
Facility: HOSPITAL | Age: 68
Discharge: HOME | End: 2023-12-31
Attending: SURGERY
Payer: COMMERCIAL

## 2023-12-31 VITALS
TEMPERATURE: 97.5 F | OXYGEN SATURATION: 98 % | DIASTOLIC BLOOD PRESSURE: 77 MMHG | TEMPERATURE: 97.5 F | SYSTOLIC BLOOD PRESSURE: 175 MMHG | DIASTOLIC BLOOD PRESSURE: 77 MMHG | OXYGEN SATURATION: 98 % | WEIGHT: 197.31 LBS | HEART RATE: 60 BPM | SYSTOLIC BLOOD PRESSURE: 175 MMHG | RESPIRATION RATE: 14 BRPM | RESPIRATION RATE: 14 BRPM | WEIGHT: 197.31 LBS | HEART RATE: 60 BPM
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
## 2023-12-31 PROCEDURE — EDAIR PR ED AIR: Performed by: EMERGENCY MEDICINE

## 2023-12-31 PROCEDURE — 99204 OFFICE O/P NEW MOD 45 MIN: CPT | Performed by: SURGERY

## 2023-12-31 PROCEDURE — 99284 EMERGENCY DEPT VISIT MOD MDM: CPT

## 2023-12-31 PROCEDURE — 12002 RPR S/N/AX/GEN/TRNK2.6-7.5CM: CPT | Performed by: SURGERY

## 2023-12-31 PROCEDURE — 72125 CT NECK SPINE W/O DYE: CPT

## 2023-12-31 PROCEDURE — 93308 TTE F-UP OR LMTD: CPT | Performed by: SURGERY

## 2023-12-31 PROCEDURE — 71045 X-RAY EXAM CHEST 1 VIEW: CPT

## 2023-12-31 PROCEDURE — 70450 CT HEAD/BRAIN W/O DYE: CPT

## 2023-12-31 PROCEDURE — 90715 TDAP VACCINE 7 YRS/> IM: CPT | Performed by: EMERGENCY MEDICINE

## 2023-12-31 PROCEDURE — 90471 IMMUNIZATION ADMIN: CPT

## 2023-12-31 PROCEDURE — 76705 ECHO EXAM OF ABDOMEN: CPT | Performed by: SURGERY

## 2023-12-31 RX ORDER — LIDOCAINE HYDROCHLORIDE 10 MG/ML
30 INJECTION, SOLUTION EPIDURAL; INFILTRATION; INTRACAUDAL; PERINEURAL ONCE
Status: DISCONTINUED | OUTPATIENT
Start: 2023-12-31 | End: 2023-12-31

## 2023-12-31 RX ADMIN — TETANUS TOXOID, REDUCED DIPHTHERIA TOXOID AND ACELLULAR PERTUSSIS VACCINE, ADSORBED 0.5 ML: 5; 2.5; 8; 8; 2.5 SUSPENSION INTRAMUSCULAR at 06:01

## 2023-12-31 NOTE — QUICK NOTE
Cervical Collar Clearance:    The patient had a CT scan of the cervical spine demonstrating no acute injury. On exam, the patient had no midline point tenderness or paresthesias/numbness/weakness in the extremities. The patient had full range of motion (was then able to flex, extend, and rotate head laterally) without pain. There were no distracting injuries and the patient was not intoxicated.      The patient's cervical spine was cleared radiologically and clinically. Cervical collar removed at this time.     Maximus Uriarte MD  12/31/2023 7:01 AM

## 2023-12-31 NOTE — DISCHARGE INSTR - AVS FIRST PAGE
Please make an appointment to have your staples taken out of your scalp in 2 weeks with the trauma team. See follow up for details on how to make an appointment.

## 2023-12-31 NOTE — H&P
H&P - Trauma   Junior Torres 68 y.o. male MRN: [id number]  Unit/Bed#: TR 02 Encounter: [id number]    Trauma Alert: Level B   Model of Arrival: Ambulance    Trauma Team: Attending mary, Residents arias, and Fellow terri  Consultants:     None     Assessment/Plan   Active Problems / Assessment:   Fall out of bed  Posterior scalp laceration -- appears old, well approximated, no need for lac repair     Plan:   FAST negative  CXR negative  STAT CT head and neck - await reads, if negative will discharge home, if positive for acute intracranial pathology will discuss plan with attending/fellow of record  No need for lac repair    History of Present Illness     Chief Complaint: fall out of bed, posterior scalp pain  Mechanism:Fall     HPI:    Junior Torres is a 68 y.o. male on eliquis who presents after a fall out of bed with positive headstrike of his posterior head, denies loss of consciousness. Denies fever/chills, denies nausea/vomiting/diarrhea. Denies chest pain or shortness of breath. Denies pain in any of his extremities.    Review of Systems   Constitutional:  Negative for activity change and fatigue.   HENT:  Negative for sinus pain and sore throat.    Eyes:  Negative for photophobia and visual disturbance.   Respiratory:  Negative for chest tightness and shortness of breath.    Cardiovascular:  Negative for chest pain and palpitations.   Gastrointestinal:  Negative for diarrhea, nausea and vomiting.   Endocrine: Negative for cold intolerance and heat intolerance.   Genitourinary:  Negative for dysuria and flank pain.   Musculoskeletal:  Negative for back pain and neck pain.   Skin:  Positive for wound (posterior scalp wound). Negative for color change and pallor.   Allergic/Immunologic: Negative for environmental allergies and food allergies.   Neurological:  Negative for weakness, numbness and headaches.   Hematological:  Negative for adenopathy. Does not bruise/bleed easily.   Psychiatric/Behavioral:  Negative for  agitation and confusion.      12-point, complete review of systems was reviewed and negative except as stated above.     Historical Information     No past medical history on file.  No past surgical history on file.        Immunization History   Administered Date(s) Administered    Tdap 12/31/2023     Last Tetanus: given in trauma bay  Family History: Non-contributory    1. Before the illness or injury that brought you to the Emergency, did you need someone to help you on a regular basis? 0=No   2. Since the illness or injury that brought you to the Emergency, have you needed more help than usual to take care of yourself? 0=No   3. Have you been hospitalized for one or more nights during the past 6 months (excluding a stay in the Emergency Department)? 0=No   4. In general, do you see well? 0=Yes   5. In general, do you have serious problems with your memory? 0=No   6. Do you take more than three different medications everyday? 0=No   TOTAL   0     Did you order a geriatric consult if the score was 2 or greater?: no     Meds/Allergies   all current active meds have been reviewed  Allergies have not been reviewed;  Not on File    Objective   Initial Vitals:   Temperature: 97.5 °F (36.4 °C) (12/31/23 0550)  Pulse: 73 (12/31/23 0550)  Respirations: 20 (12/31/23 0550)  Blood Pressure: 152/81 (12/31/23 0550)    Primary Survey:   Airway:        Status: patent;        Pre-hospital Interventions: none        Hospital Interventions: none  Breathing:        Pre-hospital Interventions: none       Effort: normal       Right breath sounds: normal       Left breath sounds: normal  Circulation:        Rhythm: regular       Rate: regular   Right Pulses Left Pulses    R radial: 2+    R pedal: 2+     L radial: 2+    L pedal: 2+       Disability:        GCS: Eye: 4; Verbal: 5 Motor: 6 Total: 15       Right Pupil: 2 mm;  round;  reactive         Left Pupil:  2 mm;  round;  reactive      R Motor Strength L Motor Strength    R :  5/5  R dorsiflex: 5/5  R plantarflex: 5/5 L : 5/5  L dorsiflex: 5/5  L plantarflex: 5/5        Sensory:  No sensory deficit  Exposure:       Completed: Yes      Secondary Survey:  Physical Exam  Vitals reviewed.   Constitutional:       General: He is not in acute distress.  HENT:      Head: Normocephalic and atraumatic.      Right Ear: Tympanic membrane and external ear normal.      Left Ear: Tympanic membrane and external ear normal.      Nose: Nose normal. No congestion.      Mouth/Throat:      Mouth: Mucous membranes are moist.      Pharynx: Oropharynx is clear. No oropharyngeal exudate or posterior oropharyngeal erythema.   Eyes:      General: No scleral icterus.     Extraocular Movements: Extraocular movements intact.      Conjunctiva/sclera: Conjunctivae normal.      Pupils: Pupils are equal, round, and reactive to light.   Cardiovascular:      Rate and Rhythm: Normal rate and regular rhythm.      Pulses: Normal pulses.   Pulmonary:      Effort: Pulmonary effort is normal. No respiratory distress.   Abdominal:      General: Abdomen is flat.      Palpations: Abdomen is soft.      Tenderness: There is no abdominal tenderness.   Musculoskeletal:         General: No tenderness or deformity.      Cervical back: No tenderness.   Skin:     General: Skin is warm and dry.      Capillary Refill: Capillary refill takes less than 2 seconds.      Comments: Posterior right scalp 1.5 inch laceration, appears well approximated, does not gap when pressure applied   Neurological:      General: No focal deficit present.      Mental Status: He is alert and oriented to person, place, and time.      Sensory: No sensory deficit.      Motor: No weakness.   Psychiatric:         Mood and Affect: Mood normal.         Behavior: Behavior normal.         Invasive Devices       Peripheral Intravenous Line  Duration             Peripheral IV 12/31/23 Left Antecubital <1 day                  Lab Results: I have personally reviewed all  "pertinent laboratory/test results 12/31/23 and in the preceding 24 hours.  No results for input(s): \"WBC\", \"HGB\", \"HCT\", \"PLT\", \"BANDSPCT\", \"SODIUM\", \"K\", \"CL\", \"CO2\", \"BUN\", \"CREATININE\", \"GLUC\", \"CAIONIZED\", \"MG\", \"PHOS\", \"AST\", \"ALT\", \"ALB\", \"TBILI\", \"DBILI\", \"ALKPHOS\", \"PTT\", \"INR\", \"HSTNI0\", \"HSTNI2\", \"BNP\", \"LACTICACID\" in the last 72 hours.    Imaging Results: I have personally reviewed pertinent images saved in PACS. CT scan findings (and other pertinent positive findings on images) were discussed with radiology. My interpretation of the images/reports are as follows:  Chest Xray(s): negative for acute findings   FAST exam(s): negative for acute findings   CT Scan(s): pending   Additional Xray(s): N/A     Other Studies:     Code Status: No Order  Advance Directive and Living Will:      Power of :    POLST:    I have spent 26 minutes with Patient  today in which greater than 50% of this time was spent in counseling/coordination of care regarding Diagnostic results, Impressions, and Counseling / Coordination of care.         "

## 2023-12-31 NOTE — PROCEDURES
Universal Protocol:  Consent: Verbal consent obtained.  Consent given by: patient  Patient identity confirmed: verbally with patient and arm band  Laceration repair    Date/Time: 12/31/2023 7:10 AM    Performed by: Maximus Uriarte MD  Authorized by: Maximus Uriarte MD  Body area: head/neck  Location details: scalp  Laceration length: 3 cm  Anesthesia: local infiltration      Procedure Details:  Irrigation solution: saline  Skin closure: staples          4 staples were used for closure

## 2023-12-31 NOTE — ED PROVIDER NOTES
Emergency Department Airway Evaluation and Management Form    History  Obtained from: EMS      Chief Complaint:  Trauma Alert    HPI: Pt is a male, transfer from [facility]. Roll out of bed, (+) head strike, on eliquis. No LOC      I have reviewed and agree with the history as documented.    Allergies  Allergies: see nurses notes    Physical Exam    See nurses notes for vitals  Supplemental Oxygen:none    GCS: 15    Neuro: Alert and oriented x4C  Psych: not combative, not anxious, cooperative for exam  Neck: In collar, No JVD, No midline tenderness  Respiratory: CTA  Mouth:  no trauma  Pharynx: patent        ED Medications given  See nursing notes    Intubation    No intubation required    Final Diagnosis:  Chronic anticoagulation, CHI    ED Provider  Electronically Signed by       Luis Armando Rodrigues DO  12/31/23 0571

## 2023-12-31 NOTE — PROCEDURES
POC FAST US    Date/Time: 12/31/2023 6:05 AM    Performed by: Maximus Uriarte MD  Authorized by: Maximus Uriarte MD    Patient location:  Trauma  Procedure details:     Exam Type:  Diagnostic    Indications: blunt abdominal trauma and blunt chest trauma      Assess for:  Intra-abdominal fluid and pericardial effusion    Technique: FAST      Views obtained:  Heart - Pericardial sac, RUQ - Pitt's Pouch, LUQ - Splenorenal space and Suprapubic - Pouch of Quentin    Image quality: diagnostic    FAST Findings:     RUQ (Hepatorenal) free fluid: absent      LUQ (Splenorenal) free fluid: absent      Suprapubic free fluid: absent      Cardiac wall motion: identified      Pericardial effusion: absent    Interpretation:     Impressions: negative

## 2024-01-05 ENCOUNTER — HOME HEALTH ADMISSION (OUTPATIENT)
Dept: HOME HEALTH SERVICES | Facility: HOME HEALTHCARE | Age: 69
End: 2024-01-05
Payer: COMMERCIAL

## 2024-01-07 ENCOUNTER — HOME CARE VISIT (OUTPATIENT)
Dept: HOME HEALTH SERVICES | Facility: HOME HEALTHCARE | Age: 69
End: 2024-01-07

## 2024-01-08 ENCOUNTER — HOME CARE VISIT (OUTPATIENT)
Dept: HOME HEALTH SERVICES | Facility: HOME HEALTHCARE | Age: 69
End: 2024-01-08

## 2024-01-08 ENCOUNTER — PATIENT MESSAGE (OUTPATIENT)
Dept: ENDOCRINOLOGY | Facility: CLINIC | Age: 69
End: 2024-01-08

## 2024-01-08 DIAGNOSIS — E10.9 INSULIN DEPENDENT TYPE 1 DIABETES MELLITUS (HCC): ICD-10-CM

## 2024-01-09 ENCOUNTER — HOME CARE VISIT (OUTPATIENT)
Dept: HOME HEALTH SERVICES | Facility: HOME HEALTHCARE | Age: 69
End: 2024-01-09
Payer: COMMERCIAL

## 2024-01-09 VITALS
RESPIRATION RATE: 16 BRPM | SYSTOLIC BLOOD PRESSURE: 98 MMHG | TEMPERATURE: 96.4 F | OXYGEN SATURATION: 98 % | HEART RATE: 67 BPM | DIASTOLIC BLOOD PRESSURE: 58 MMHG

## 2024-01-09 PROCEDURE — G0299 HHS/HOSPICE OF RN EA 15 MIN: HCPCS

## 2024-01-09 PROCEDURE — 400013 VN SOC

## 2024-01-09 RX ORDER — INSULIN GLARGINE 300 U/ML
20 INJECTION, SOLUTION SUBCUTANEOUS
Qty: 4.5 ML | Refills: 0 | Status: SHIPPED | OUTPATIENT
Start: 2024-01-09

## 2024-01-09 NOTE — CASE COMMUNICATION
Back office please fax to PCP Tree Spence MD Fax: [phone]   Patient Karson You  59    [facility]'s [facility] has admitted your patient to Home Health service with the following disciplines:      SN, PT and OT  Response needed, please respond via Phone [phone] in [facility]  Primary focus of home health care: Endocrine  Patient stated goals of care: Get blood sugars and blood pressure under control.    Anticipated  visit pattern: 2w3 and next visit date: 24    Significant clinical findings: Patient is not alert to year.   Patient has four staples posterior right head closed approximated slight erythema anterior aspect of incision ready to be removed. Ok for VNA RN to remove? Photo in media.   Aide reports coughing with swallowing ok for a V[facility] Speech Therapy referral?  Patient reports no pain, ok to change tylenol 375 every 8 hours to PRN  for pain?    Other pertinent information: TT to Dr. Lewis Butts:  Patient was taking insulin 6 units TID rather then 14 units TID.  Patient was taking Toujeo only 16 units at night not 15 units with breakfast as well. Edulcorated on new orders in writing. Blood glucose was in the 300's yesterday. Daughter refuses to follow new orders, except new sliding scale, unless endocrinology agrees with new orders. Discharge medication list  uploaded in media in EPIC.    Thank you for allowing us to participate in the care of your patient.      Ryne Felder RN

## 2024-01-10 ENCOUNTER — HOME CARE VISIT (OUTPATIENT)
Dept: HOME HEALTH SERVICES | Facility: HOME HEALTHCARE | Age: 69
End: 2024-01-10
Payer: COMMERCIAL

## 2024-01-10 VITALS — OXYGEN SATURATION: 99 % | SYSTOLIC BLOOD PRESSURE: 110 MMHG | HEART RATE: 69 BPM | DIASTOLIC BLOOD PRESSURE: 56 MMHG

## 2024-01-10 PROCEDURE — G0152 HHCP-SERV OF OT,EA 15 MIN: HCPCS

## 2024-01-10 NOTE — PATIENT COMMUNICATION
Returned pt's daughter v/m from earlier and let her know Toujeo was sent into [facility] yesterday.      Daughter is requesting a call back to discuss insulin dosage that the nursing facility wants to put the pt on.  She is concerned and as declined the change.  Would like to discuss with provider.

## 2024-01-10 NOTE — Clinical Note
SN had in SOC note that ST was going to be ordered.     ----- Message -----  From: Yola Dominguez  Sent: 1/11/2024  12:23 PM EST  To: Tamica Wilson OT            ----- Message -----  From: Tamica Wilson OT  Sent: 1/10/2024   7:04 PM EST  To: Alina Montes RN; Cally Lloyd, PT; *    OT evaluation completed 1/10/24. Pt requires supervision with spongebathing, dressing, and functional transfers. Dtr comes 1-2x a week to assist pt with bathing in shower. Private aid daily from 8 AM to 1 PM. Pt has required assistance for approx 1 year since CVA. Discussed OT services. Pt feels no OT is needed, functioning at prior level with ADLs. Pt agreeable to PT and speech therapy. Spoke to dtr via telephone. DC OT services. Pt seen 1x1. Message left for PCP.

## 2024-01-10 NOTE — Clinical Note
OT evaluation completed 1/10/24. Pt requires supervision with spongebathing, dressing, and functional transfers. Dtr comes 1-2x a week to assist pt with bathing in shower. Private aid daily from 8 AM to 1 PM. Pt has required assistance for approx 1 year since CVA. Discussed OT services. Pt feels no OT is needed, functioning at prior level with ADLs. Pt agreeable to PT and speech therapy. Spoke to dtr via telephone. DC OT services. Pt seen 1x1. Message left for PCP.

## 2024-01-11 ENCOUNTER — HOME CARE VISIT (OUTPATIENT)
Dept: HOME HEALTH SERVICES | Facility: HOME HEALTHCARE | Age: 69
End: 2024-01-11
Payer: COMMERCIAL

## 2024-01-11 VITALS — OXYGEN SATURATION: 92 % | HEART RATE: 70 BPM | SYSTOLIC BLOOD PRESSURE: 108 MMHG | DIASTOLIC BLOOD PRESSURE: 48 MMHG

## 2024-01-11 VITALS
DIASTOLIC BLOOD PRESSURE: 66 MMHG | TEMPERATURE: 98.7 F | SYSTOLIC BLOOD PRESSURE: 118 MMHG | RESPIRATION RATE: 18 BRPM | OXYGEN SATURATION: 98 % | HEART RATE: 66 BPM

## 2024-01-11 PROCEDURE — G0151 HHCP-SERV OF PT,EA 15 MIN: HCPCS

## 2024-01-11 PROCEDURE — G0299 HHS/HOSPICE OF RN EA 15 MIN: HCPCS

## 2024-01-15 ENCOUNTER — PATIENT MESSAGE (OUTPATIENT)
Dept: ENDOCRINOLOGY | Facility: CLINIC | Age: 69
End: 2024-01-15

## 2024-01-16 ENCOUNTER — TREATMENT (OUTPATIENT)
Dept: OTHER | Facility: HOSPITAL | Age: 69
End: 2024-01-16
Payer: COMMERCIAL

## 2024-01-16 ENCOUNTER — HOME CARE VISIT (OUTPATIENT)
Dept: HOME HEALTH SERVICES | Facility: HOME HEALTHCARE | Age: 69
End: 2024-01-16
Payer: COMMERCIAL

## 2024-01-16 ENCOUNTER — DOCUMENTATION (OUTPATIENT)
Dept: OTHER | Facility: HOSPITAL | Age: 69
End: 2024-01-16

## 2024-01-16 VITALS
TEMPERATURE: 97.7 F | OXYGEN SATURATION: 18 % | RESPIRATION RATE: 18 BRPM | HEART RATE: 68 BPM | SYSTOLIC BLOOD PRESSURE: 138 MMHG | DIASTOLIC BLOOD PRESSURE: 64 MMHG

## 2024-01-16 VITALS — HEART RATE: 73 BPM | OXYGEN SATURATION: 96 % | DIASTOLIC BLOOD PRESSURE: 78 MMHG | SYSTOLIC BLOOD PRESSURE: 152 MMHG

## 2024-01-16 DIAGNOSIS — E27.40 ADRENAL INSUFFICIENCY (HCC): Primary | ICD-10-CM

## 2024-01-16 DIAGNOSIS — E10.65 TYPE 1 DIABETES MELLITUS WITH HYPERGLYCEMIA (HCC): ICD-10-CM

## 2024-01-16 DIAGNOSIS — E10.65 TYPE 1 DIABETES MELLITUS WITH HYPERGLYCEMIA (HCC): Primary | ICD-10-CM

## 2024-01-16 DIAGNOSIS — I50.32 CHRONIC DIASTOLIC HEART FAILURE (HCC): ICD-10-CM

## 2024-01-16 PROCEDURE — G0157 HHC PT ASSISTANT EA 15: HCPCS

## 2024-01-16 PROCEDURE — G0299 HHS/HOSPICE OF RN EA 15 MIN: HCPCS

## 2024-01-16 PROCEDURE — 95251 CONT GLUC MNTR ANALYSIS I&R: CPT | Performed by: INTERNAL MEDICINE

## 2024-01-16 RX ORDER — INSULIN LISPRO 100 [IU]/ML
INJECTION, SOLUTION INTRAVENOUS; SUBCUTANEOUS
Qty: 30 ML | Refills: 1 | Status: SHIPPED | OUTPATIENT
Start: 2024-01-16

## 2024-01-16 RX ORDER — INSULIN GLARGINE 300 U/ML
15 INJECTION, SOLUTION SUBCUTANEOUS
Qty: 15 ML | Refills: 2 | Status: SHIPPED | OUTPATIENT
Start: 2024-01-16 | End: 2024-01-16

## 2024-01-16 RX ORDER — INSULIN LISPRO 100 [IU]/ML
INJECTION, SOLUTION INTRAVENOUS; SUBCUTANEOUS
Qty: 30 ML | Refills: 0 | Status: SHIPPED | OUTPATIENT
Start: 2024-01-16

## 2024-01-16 RX ORDER — INSULIN GLARGINE 300 U/ML
18 INJECTION, SOLUTION SUBCUTANEOUS
Start: 2024-01-16

## 2024-01-16 NOTE — PROGRESS NOTES
Called and d/w daughter:    Diagnoses and all orders for this visit:     Type 1 diabetes mellitus with hypoglycemia and without coma (HCC).  He is uncontrolled with mostly severe hyperglycemia.  Tends to snack through the day.  He recently was discharged from [facility].      CGM data review::  Device: dexcom Dates: 1/2/24-1/15/24  Usage: 93 % Av glu: 235 mg/dL  SD: 102 mg/dL CV:   % GMI: 8.9  %  TIR: 36 %  TAR: 22+40 %  TBR: 1+1  %    Glycemic patters:  predominantly hyperglycemia fasting and postprandial.  Hypoglycemia: No    Recommendation:       D/w daughter and changes to following regimen.     Humalog Insulin    8u    8u    8u     Regular, Apidra, Humalog orNovolog Sliding Scale:   <80                      151-200 +1  +1 +1     201-250 +2 +2 +2 +   251-320 +3 +3 +3 +   321-400 +4 +4 +4 +   >400 +5 +5 +5 +         Toujeo Insulin       18u         Follow-up in 2 weeks with CGM review.     Adrenal insufficiency.  He has primary adrenal insufficiency associated with orthostasis.  He is currently on hydrocortisone 20 mg a.m./15 mg p.m. dose, fludrocortisone 0.2 mg daily and midodrine.     Vitamin D deficiency.  Advised vitamin D3 5000 IU daily OTC.  -     Vitamin D 25 hydroxy; Future

## 2024-01-17 ENCOUNTER — TELEPHONE (OUTPATIENT)
Dept: HOME HEALTH SERVICES | Facility: HOME HEALTHCARE | Age: 69
End: 2024-01-17

## 2024-01-18 ENCOUNTER — HOME CARE VISIT (OUTPATIENT)
Dept: HOME HEALTH SERVICES | Facility: HOME HEALTHCARE | Age: 69
End: 2024-01-18
Payer: COMMERCIAL

## 2024-01-18 VITALS — SYSTOLIC BLOOD PRESSURE: 108 MMHG | DIASTOLIC BLOOD PRESSURE: 70 MMHG

## 2024-01-18 PROCEDURE — G0157 HHC PT ASSISTANT EA 15: HCPCS

## 2024-01-23 ENCOUNTER — HOME CARE VISIT (OUTPATIENT)
Dept: HOME HEALTH SERVICES | Facility: HOME HEALTHCARE | Age: 69
End: 2024-01-23
Payer: COMMERCIAL

## 2024-01-23 VITALS — SYSTOLIC BLOOD PRESSURE: 112 MMHG | DIASTOLIC BLOOD PRESSURE: 60 MMHG

## 2024-01-23 PROCEDURE — G0157 HHC PT ASSISTANT EA 15: HCPCS

## 2024-01-24 ENCOUNTER — HOME CARE VISIT (OUTPATIENT)
Dept: HOME HEALTH SERVICES | Facility: HOME HEALTHCARE | Age: 69
End: 2024-01-24
Payer: COMMERCIAL

## 2024-01-24 NOTE — CASE COMMUNICATION
Pt transferring from multiple surfaces at S level. Pt has been provided with written HEP program for seated ther ex. Standing ther ex and balance initiated previous visit.   Pt's daughter is currently assisting pt with stair climbing to second floor of home. PTA provides CGA while stair climbing during visit.  2 steps to exit home through rear entrance. Pt requires max A when entering home due to height of step and no hand railing. Pt v enio impulsive and requires Max cueing for safety.   PTA has recommended step stool for bed mobility. Height of bed makes it challenging for pt to safely sit on EOB.

## 2024-01-25 ENCOUNTER — HOME CARE VISIT (OUTPATIENT)
Dept: HOME HEALTH SERVICES | Facility: HOME HEALTHCARE | Age: 69
End: 2024-01-25
Payer: COMMERCIAL

## 2024-01-25 VITALS
TEMPERATURE: 98 F | DIASTOLIC BLOOD PRESSURE: 61 MMHG | OXYGEN SATURATION: 98 % | HEART RATE: 79 BPM | SYSTOLIC BLOOD PRESSURE: 112 MMHG

## 2024-01-25 VITALS
SYSTOLIC BLOOD PRESSURE: 132 MMHG | TEMPERATURE: 98 F | OXYGEN SATURATION: 99 % | HEART RATE: 78 BPM | DIASTOLIC BLOOD PRESSURE: 70 MMHG | RESPIRATION RATE: 16 BRPM

## 2024-01-25 PROCEDURE — G0299 HHS/HOSPICE OF RN EA 15 MIN: HCPCS

## 2024-01-25 PROCEDURE — G0151 HHCP-SERV OF PT,EA 15 MIN: HCPCS

## 2024-01-25 NOTE — CASE COMMUNICATION
Patient is on track to be discharged next thursday. NOMNC signed today. Next week's focus will be on the 2+2 stairs out back. Not performed today due to wet surface.   Also can focus on progressing balance further to SLS. Educated patient and his family on practicing at the sink.  Used step stool for bed transfers getting into bed with addition of theraband underneath the stool to prevent it from sliding.

## 2024-01-30 ENCOUNTER — HOME CARE VISIT (OUTPATIENT)
Dept: HOME HEALTH SERVICES | Facility: HOME HEALTHCARE | Age: 69
End: 2024-01-30
Payer: COMMERCIAL

## 2024-01-30 ENCOUNTER — OFFICE VISIT (OUTPATIENT)
Dept: ENDOCRINOLOGY | Facility: CLINIC | Age: 69
End: 2024-01-30
Payer: COMMERCIAL

## 2024-01-30 VITALS
BODY MASS INDEX: 31.76 KG/M2 | HEART RATE: 70 BPM | OXYGEN SATURATION: 98 % | DIASTOLIC BLOOD PRESSURE: 78 MMHG | WEIGHT: 186 LBS | SYSTOLIC BLOOD PRESSURE: 134 MMHG | HEIGHT: 64 IN | TEMPERATURE: 97.8 F | RESPIRATION RATE: 14 BRPM

## 2024-01-30 DIAGNOSIS — E10.9 BRITTLE DIABETES MELLITUS (HCC): ICD-10-CM

## 2024-01-30 DIAGNOSIS — E10.65 TYPE 1 DIABETES MELLITUS WITH HYPERGLYCEMIA (HCC): ICD-10-CM

## 2024-01-30 DIAGNOSIS — E27.40 ADRENAL INSUFFICIENCY (HCC): Primary | ICD-10-CM

## 2024-01-30 DIAGNOSIS — E16.2 HYPOGLYCEMIA: ICD-10-CM

## 2024-01-30 PROBLEM — E55.9 VITAMIN D DEFICIENCY: Status: ACTIVE | Noted: 2024-01-30

## 2024-01-30 PROCEDURE — 99214 OFFICE O/P EST MOD 30 MIN: CPT | Performed by: INTERNAL MEDICINE

## 2024-01-30 PROCEDURE — 95251 CONT GLUC MNTR ANALYSIS I&R: CPT | Performed by: INTERNAL MEDICINE

## 2024-01-30 NOTE — PATIENT INSTRUCTIONS
Humalog Insulin    9u    9u    9u     Regular, Apidra, Humalog orNovolog Sliding Scale:   <80                      151-200 +1  +1 +1     201-250 +2 +2 +2 +   251-320 +3 +3 +3 +   321-400 +4 +4 +4 +   >400 +5 +5 +5 +         Toujeo Insulin       18u

## 2024-01-30 NOTE — PROGRESS NOTES
"  Follow-up Patient Progress Note      CC: type 1 diabetes     History of Present Illness:   67 yr male with Type 1 diabetes since age 39yr with brittle diabetes, hypertension, obesity, hyperlipidemia, atrial fibrillation, orthostasis, anxiety/depression, anemia, adrenal insufficiency and coronary artery disease.  Last visit was 6/29/2023.     He was just admitted to the hospital 2 days ago for significant hypoglycemia.  He was discharged with slightly increased dose of hydrocortisone and readjustment of basal bolus insulin therapy.        CGM data review::  Device: dexcom          Dates:  1/2/24-1/15/24             Usage: 93 %   Av glu: 235 mg/dL                   SD: 102 mg/dL      CV:   %            GMI: 8.9  %  TIR: 36 %                    TAR: 22+40 %             TBR: 1+1  %     Glycemic patters:  predominantly hyperglycemia fasting and postprandial.  Hypoglycemia: No     Current meds:  Lantus 18 units q.h.s.  Humalog KwikPen 8-8-8 units with each meal  Hydrocortisone 20 mg am and 15 mg pm dose/ Fludrocortisone 0.2 mg daily     Opthamology: yes  Podiatry: no  vaccination: yes  Dental:  Pancreatitis: no     Ace/ARB: no  Statin:  Lipitor  Thyroid issues:  No    Physical Exam:  Body mass index is 31.93 kg/m².  /78 (BP Location: Right arm, Patient Position: Sitting)   Pulse 70   Temp 97.8 °F (36.6 °C) (Tympanic)   Resp 14   Ht 5' 4\" (1.626 m)   Wt 84.4 kg (186 lb)   SpO2 98%   BMI 31.93 kg/m²    Vitals:    01/30/24 1026   Weight: 84.4 kg (186 lb)        Physical Exam  Constitutional:       General: He is not in acute distress.     Appearance: He is well-developed. He is not ill-appearing.   HENT:      Head: Normocephalic and atraumatic.      Nose: Nose normal.      Mouth/Throat:      Pharynx: Oropharynx is clear.   Eyes:      Extraocular Movements: Extraocular movements intact.      Conjunctiva/sclera: Conjunctivae normal.   Neck:      Thyroid: No thyromegaly.   Cardiovascular:      Rate and Rhythm: " "Normal rate.   Pulmonary:      Effort: Pulmonary effort is normal.   Musculoskeletal:         General: No deformity.      Cervical back: Normal range of motion.   Skin:     Capillary Refill: Capillary refill takes less than 2 seconds.      Coloration: Skin is not pale.      Findings: No rash.   Neurological:      Mental Status: He is alert and oriented to person, place, and time.   Psychiatric:         Behavior: Behavior normal.       Labs:   Lab Results   Component Value Date    HGBA1C 8.8 (H) 11/11/2023       Lab Results   Component Value Date    KKN8BQLNEFJS 0.779 06/25/2023       Lab Results   Component Value Date    CREATININE 0.88 12/21/2023    CREATININE 0.88 12/20/2023    CREATININE 0.90 12/19/2023    BUN 13 12/21/2023    K 3.6 12/21/2023     12/21/2023    CO2 30 12/21/2023     eGFR   Date Value Ref Range Status   12/21/2023 89 ml/min/1.73sq m Final       Lab Results   Component Value Date    ALT 19 12/20/2023    AST 16 12/20/2023    ALKPHOS 67 12/20/2023       No results found for: \"CHOLESTEROL\"  No results found for: \"HDL\"  No results found for: \"TRIG\"  No results found for: \"NONHDLC\"      Assessment/Plan:    Problem List Items Addressed This Visit          Endocrine    Recurrent hypoglycemia     Stable currently. Provided hypoglycemia instructions.         Type 1 diabetes mellitus with hyperglycemia (HCC)     He is uncontrolled with recent hospitalization and rehabilitation. We reviewed his cgm data and agreed to use following settings:  Humalog Insulin    9u    9u    9u     Regular, Apidra, Humalog orNovolog Sliding Scale:   <80                      151-200 +1  +1 +1     201-250 +2 +2 +2 +   251-320 +3 +3 +3 +   321-400 +4 +4 +4 +   >400 +5 +5 +5 +         Toujeo Insulin       18u     Follow up in 3 months.      Lab Results   Component Value Date    HGBA1C 8.8 (H) 11/11/2023            Adrenal insufficiency (HCC) - Primary     He is stable on Hydrocortisone 20mg Am and 15mg pm plus " fludrocorisone 0.1mg daily.    Double dose if there is a stressor.    Follow up in 6 months.            Other    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HCC)         I have spent a total time of 32 minutes on 01/30/24 in caring for this patient including greater than 50% of this time was spent in counseling/coordination of care as listed above.       Discussed with the patient and all questioned fully answered. He will contact me with concerns.    Lewis Butts

## 2024-02-01 ENCOUNTER — HOME CARE VISIT (OUTPATIENT)
Dept: HOME HEALTH SERVICES | Facility: HOME HEALTHCARE | Age: 69
End: 2024-02-01
Payer: COMMERCIAL

## 2024-02-01 VITALS — DIASTOLIC BLOOD PRESSURE: 60 MMHG | SYSTOLIC BLOOD PRESSURE: 138 MMHG

## 2024-02-01 PROCEDURE — G0157 HHC PT ASSISTANT EA 15: HCPCS

## 2024-02-02 ENCOUNTER — HOME CARE VISIT (OUTPATIENT)
Dept: HOME HEALTH SERVICES | Facility: HOME HEALTHCARE | Age: 69
End: 2024-02-02
Payer: COMMERCIAL

## 2024-02-02 VITALS
DIASTOLIC BLOOD PRESSURE: 64 MMHG | SYSTOLIC BLOOD PRESSURE: 134 MMHG | HEART RATE: 72 BPM | TEMPERATURE: 98 F | OXYGEN SATURATION: 98 %

## 2024-02-02 PROCEDURE — G0151 HHCP-SERV OF PT,EA 15 MIN: HCPCS

## 2024-02-02 NOTE — CASE COMMUNICATION
NOMNC signed and dated for 2/1/24. Unable to DC patient at that time due to rescheduling appointment from Tuesday 1/30/24, and rescheduling DC appointment from 2/1/24 to 2/2/24.

## 2024-02-06 NOTE — ASSESSMENT & PLAN NOTE
He is stable on Hydrocortisone 20mg Am and 15mg pm plus fludrocorisone 0.1mg daily.    Double dose if there is a stressor.    Follow up in 6 months.

## 2024-02-06 NOTE — ASSESSMENT & PLAN NOTE
He is uncontrolled with recent hospitalization and rehabilitation. We reviewed his cgm data and agreed to use following settings:  Humalog Insulin    9u    9u    9u     Regular, Apidra, Humalog orNovolog Sliding Scale:   <80                      151-200 +1  +1 +1     201-250 +2 +2 +2 +   251-320 +3 +3 +3 +   321-400 +4 +4 +4 +   >400 +5 +5 +5 +         Toujeo Insulin       18u     Follow up in 3 months.      Lab Results   Component Value Date    HGBA1C 8.8 (H) 11/11/2023

## 2024-03-01 ENCOUNTER — PATIENT MESSAGE (OUTPATIENT)
Dept: ENDOCRINOLOGY | Facility: CLINIC | Age: 69
End: 2024-03-01

## 2024-03-05 ENCOUNTER — TELEPHONE (OUTPATIENT)
Dept: ENDOCRINOLOGY | Facility: CLINIC | Age: 69
End: 2024-03-05

## 2024-03-05 DIAGNOSIS — E10.649 TYPE 1 DIABETES MELLITUS WITH HYPOGLYCEMIA AND WITHOUT COMA (HCC): ICD-10-CM

## 2024-03-05 DIAGNOSIS — I50.32 CHRONIC DIASTOLIC HEART FAILURE (HCC): ICD-10-CM

## 2024-03-05 RX ORDER — ACYCLOVIR 400 MG/1
1 TABLET ORAL
Qty: 9 EACH | Refills: 0 | Status: SHIPPED | OUTPATIENT
Start: 2024-03-05

## 2024-03-05 NOTE — PATIENT COMMUNICATION
Pt daughter called LVM re: two scripts requested for refill. Pt was confused as to why they were discontinued and would like them sent to the [facility] pharmacy.

## 2024-03-05 NOTE — TELEPHONE ENCOUNTER
----- Message from Ofelia Raymond on behalf of Karson You sent at 3/5/2024  7:07 AM EST -----  Regarding: Insulin and Dexcom sensor   Contact: [phone]  Good Morning,   I sent this message Friday in hopes to hear from someone yesterday when the office opened.     The pharmacy says my father has no refills on the insulin Lispro pens & also his Dexcom g7 sensors which I’m confused about as we just saw you in January.   If you can please send those into [facility] pharmacy as soon as possible we would appreciate it. He is now OUT of the insulin & his sensor expires Wednesday.

## 2024-03-05 NOTE — TELEPHONE ENCOUNTER
Request routed to wrong office. Pt is out of insulin. Please send to [address] [facility] as soon as possible

## 2024-03-06 DIAGNOSIS — E10.9 INSULIN DEPENDENT TYPE 1 DIABETES MELLITUS (HCC): ICD-10-CM

## 2024-03-06 DIAGNOSIS — E10.65 TYPE 1 DIABETES MELLITUS WITH HYPERGLYCEMIA (HCC): ICD-10-CM

## 2024-03-06 DIAGNOSIS — I50.32 CHRONIC DIASTOLIC HEART FAILURE (HCC): ICD-10-CM

## 2024-03-06 DIAGNOSIS — E27.40 ADRENAL INSUFFICIENCY (HCC): ICD-10-CM

## 2024-03-06 DIAGNOSIS — I21.A1 TYPE 2 MI (MYOCARDIAL INFARCTION) (HCC): ICD-10-CM

## 2024-03-06 RX ORDER — INSULIN LISPRO 100 [IU]/ML
INJECTION, SOLUTION INTRAVENOUS; SUBCUTANEOUS
Qty: 30 ML | Refills: 1 | Status: ON HOLD | OUTPATIENT
Start: 2024-03-06 | End: 2024-03-11

## 2024-03-06 RX ORDER — INSULIN GLARGINE 300 U/ML
18 INJECTION, SOLUTION SUBCUTANEOUS
Qty: 6 ML | Refills: 1 | Status: ON HOLD | OUTPATIENT
Start: 2024-03-06 | End: 2024-03-11

## 2024-03-06 RX ORDER — PEN NEEDLE, DIABETIC 32GX 5/32"
NEEDLE, DISPOSABLE MISCELLANEOUS
Qty: 200 EACH | Refills: 3 | Status: SHIPPED | OUTPATIENT
Start: 2024-03-06

## 2024-03-06 NOTE — TELEPHONE ENCOUNTER
Daughter called saying [facility] was trying to fill Humalog as vial and saying it was not covered. Called pharmacy and was able to clarify they should be filling as injection pen, but they did not have medication in stock. Asked them to check [address] [place] [address], who did have it. Called and was able to transfer script for HumaLOG and pen needles. Pharmacy notified me that the copay is $209. Confirmed they had correct insurance on file for pt. Called daughter back and informed her of script transfer and copay. She said this is way higher than normal and will call insurance to try and resolve.

## 2024-03-07 ENCOUNTER — APPOINTMENT (INPATIENT)
Dept: RADIOLOGY | Facility: HOSPITAL | Age: 69
DRG: 637 | End: 2024-03-07
Payer: COMMERCIAL

## 2024-03-07 ENCOUNTER — HOSPITAL ENCOUNTER (INPATIENT)
Facility: HOSPITAL | Age: 69
LOS: 4 days | Discharge: HOME HEALTH | DRG: 637 | End: 2024-03-11
Attending: EMERGENCY MEDICINE | Admitting: INTERNAL MEDICINE
Payer: COMMERCIAL

## 2024-03-07 ENCOUNTER — APPOINTMENT (EMERGENCY)
Dept: RADIOLOGY | Facility: HOSPITAL | Age: 69
DRG: 637 | End: 2024-03-07
Payer: COMMERCIAL

## 2024-03-07 DIAGNOSIS — E10.65 TYPE 1 DIABETES MELLITUS WITH HYPERGLYCEMIA (HCC): ICD-10-CM

## 2024-03-07 DIAGNOSIS — R13.10 DYSPHAGIA: ICD-10-CM

## 2024-03-07 DIAGNOSIS — I50.32 CHRONIC DIASTOLIC HEART FAILURE (HCC): ICD-10-CM

## 2024-03-07 DIAGNOSIS — E83.42 HYPOMAGNESEMIA: ICD-10-CM

## 2024-03-07 DIAGNOSIS — E10.9 BRITTLE DIABETES MELLITUS (HCC): ICD-10-CM

## 2024-03-07 DIAGNOSIS — E16.2 HYPOGLYCEMIA: Primary | ICD-10-CM

## 2024-03-07 DIAGNOSIS — R55 SYNCOPE: ICD-10-CM

## 2024-03-07 DIAGNOSIS — R53.1 WEAKNESS: ICD-10-CM

## 2024-03-07 PROBLEM — R79.89 ELEVATED SERUM CREATININE: Status: ACTIVE | Noted: 2024-03-07

## 2024-03-07 LAB
2HR DELTA HS TROPONIN: -13 NG/L
ALBUMIN SERPL BCP-MCNC: 4 G/DL (ref 3.5–5)
ALP SERPL-CCNC: 70 U/L (ref 34–104)
ALT SERPL W P-5'-P-CCNC: 17 U/L (ref 7–52)
ANION GAP SERPL CALCULATED.3IONS-SCNC: 8 MMOL/L
ANISOCYTOSIS BLD QL SMEAR: PRESENT
AST SERPL W P-5'-P-CCNC: 12 U/L (ref 13–39)
ATRIAL RATE: 73 BPM
BACTERIA UR QL AUTO: NORMAL /HPF
BASOPHILS # BLD MANUAL: 0 THOUSAND/UL (ref 0–0.1)
BASOPHILS NFR MAR MANUAL: 0 % (ref 0–1)
BILIRUB SERPL-MCNC: 0.66 MG/DL (ref 0.2–1)
BILIRUB UR QL STRIP: NEGATIVE
BUN SERPL-MCNC: 22 MG/DL (ref 5–25)
CALCIUM SERPL-MCNC: 8.7 MG/DL (ref 8.4–10.2)
CARDIAC TROPONIN I PNL SERPL HS: 54 NG/L
CARDIAC TROPONIN I PNL SERPL HS: 67 NG/L
CHLORIDE SERPL-SCNC: 100 MMOL/L (ref 96–108)
CLARITY UR: CLEAR
CO2 SERPL-SCNC: 26 MMOL/L (ref 21–32)
COLOR UR: ABNORMAL
CREAT SERPL-MCNC: 1.47 MG/DL (ref 0.6–1.3)
EOSINOPHIL # BLD MANUAL: 0.23 THOUSAND/UL (ref 0–0.4)
EOSINOPHIL NFR BLD MANUAL: 2 % (ref 0–6)
ERYTHROCYTE [DISTWIDTH] IN BLOOD BY AUTOMATED COUNT: 14.6 % (ref 11.6–15.1)
FLUAV RNA RESP QL NAA+PROBE: NEGATIVE
FLUBV RNA RESP QL NAA+PROBE: NEGATIVE
GFR SERPL CREATININE-BSD FRML MDRD: 48 ML/MIN/1.73SQ M
GLUCOSE SERPL-MCNC: 105 MG/DL (ref 65–140)
GLUCOSE SERPL-MCNC: 110 MG/DL (ref 65–140)
GLUCOSE SERPL-MCNC: 114 MG/DL (ref 65–140)
GLUCOSE SERPL-MCNC: 32 MG/DL (ref 65–140)
GLUCOSE SERPL-MCNC: 55 MG/DL (ref 65–140)
GLUCOSE SERPL-MCNC: 63 MG/DL (ref 65–140)
GLUCOSE SERPL-MCNC: 95 MG/DL (ref 65–140)
GLUCOSE UR STRIP-MCNC: NEGATIVE MG/DL
HCT VFR BLD AUTO: 36.4 % (ref 36.5–49.3)
HGB BLD-MCNC: 11.9 G/DL (ref 12–17)
HGB UR QL STRIP.AUTO: ABNORMAL
INR PPP: 1.24 (ref 0.84–1.19)
KETONES UR STRIP-MCNC: NEGATIVE MG/DL
LACTATE SERPL-SCNC: 1.7 MMOL/L (ref 0.5–2)
LEUKOCYTE ESTERASE UR QL STRIP: NEGATIVE
LYMPHOCYTES # BLD AUTO: 0.56 THOUSAND/UL (ref 0.6–4.47)
LYMPHOCYTES # BLD AUTO: 5 % (ref 14–44)
MCH RBC QN AUTO: 31.4 PG (ref 26.8–34.3)
MCHC RBC AUTO-ENTMCNC: 32.7 G/DL (ref 31.4–37.4)
MCV RBC AUTO: 96 FL (ref 82–98)
MONOCYTES # BLD AUTO: 1.47 THOUSAND/UL (ref 0–1.22)
MONOCYTES NFR BLD: 13 % (ref 4–12)
NEUTROPHILS # BLD MANUAL: 9.03 THOUSAND/UL (ref 1.85–7.62)
NEUTS BAND NFR BLD MANUAL: 5 % (ref 0–8)
NEUTS SEG NFR BLD AUTO: 75 % (ref 43–75)
NITRITE UR QL STRIP: NEGATIVE
NON-SQ EPI CELLS URNS QL MICRO: NORMAL /HPF
P AXIS: 14 DEGREES
PH UR STRIP.AUTO: 5.5 [PH]
PLATELET # BLD AUTO: 205 THOUSANDS/UL (ref 149–390)
PLATELET BLD QL SMEAR: ADEQUATE
PMV BLD AUTO: 10.5 FL (ref 8.9–12.7)
POTASSIUM SERPL-SCNC: 3.6 MMOL/L (ref 3.5–5.3)
PR INTERVAL: 156 MS
PROCALCITONIN SERPL-MCNC: 1.08 NG/ML
PROT SERPL-MCNC: 6.7 G/DL (ref 6.4–8.4)
PROT UR STRIP-MCNC: NEGATIVE MG/DL
PROTHROMBIN TIME: 15.5 SECONDS (ref 11.6–14.5)
QRS AXIS: 39 DEGREES
QRSD INTERVAL: 68 MS
QT INTERVAL: 430 MS
QTC INTERVAL: 473 MS
RBC # BLD AUTO: 3.79 MILLION/UL (ref 3.88–5.62)
RBC #/AREA URNS AUTO: NORMAL /HPF
RBC MORPH BLD: PRESENT
RSV RNA RESP QL NAA+PROBE: NEGATIVE
SARS-COV-2 RNA RESP QL NAA+PROBE: NEGATIVE
SODIUM SERPL-SCNC: 134 MMOL/L (ref 135–147)
SP GR UR STRIP.AUTO: 1.01 (ref 1–1.03)
T WAVE AXIS: 125 DEGREES
UROBILINOGEN UR STRIP-ACNC: <2 MG/DL
VENTRICULAR RATE: 73 BPM
WBC # BLD AUTO: 11.29 THOUSAND/UL (ref 4.31–10.16)
WBC #/AREA URNS AUTO: NORMAL /HPF

## 2024-03-07 PROCEDURE — 84484 ASSAY OF TROPONIN QUANT: CPT

## 2024-03-07 PROCEDURE — 71045 X-RAY EXAM CHEST 1 VIEW: CPT

## 2024-03-07 PROCEDURE — 70450 CT HEAD/BRAIN W/O DYE: CPT

## 2024-03-07 PROCEDURE — 99223 1ST HOSP IP/OBS HIGH 75: CPT | Performed by: INTERNAL MEDICINE

## 2024-03-07 PROCEDURE — 82948 REAGENT STRIP/BLOOD GLUCOSE: CPT

## 2024-03-07 PROCEDURE — 99291 CRITICAL CARE FIRST HOUR: CPT | Performed by: EMERGENCY MEDICINE

## 2024-03-07 PROCEDURE — 0241U HB NFCT DS VIR RESP RNA 4 TRGT: CPT

## 2024-03-07 PROCEDURE — 93005 ELECTROCARDIOGRAM TRACING: CPT

## 2024-03-07 PROCEDURE — 85610 PROTHROMBIN TIME: CPT

## 2024-03-07 PROCEDURE — 84145 PROCALCITONIN (PCT): CPT

## 2024-03-07 PROCEDURE — 36415 COLL VENOUS BLD VENIPUNCTURE: CPT

## 2024-03-07 PROCEDURE — 96361 HYDRATE IV INFUSION ADD-ON: CPT

## 2024-03-07 PROCEDURE — 83605 ASSAY OF LACTIC ACID: CPT

## 2024-03-07 PROCEDURE — 87040 BLOOD CULTURE FOR BACTERIA: CPT | Performed by: INTERNAL MEDICINE

## 2024-03-07 PROCEDURE — 99285 EMERGENCY DEPT VISIT HI MDM: CPT

## 2024-03-07 PROCEDURE — 80053 COMPREHEN METABOLIC PANEL: CPT

## 2024-03-07 PROCEDURE — 85007 BL SMEAR W/DIFF WBC COUNT: CPT

## 2024-03-07 PROCEDURE — 85027 COMPLETE CBC AUTOMATED: CPT

## 2024-03-07 PROCEDURE — 81001 URINALYSIS AUTO W/SCOPE: CPT

## 2024-03-07 PROCEDURE — 93010 ELECTROCARDIOGRAM REPORT: CPT | Performed by: INTERNAL MEDICINE

## 2024-03-07 PROCEDURE — 96374 THER/PROPH/DIAG INJ IV PUSH: CPT

## 2024-03-07 RX ORDER — ACETAMINOPHEN 325 MG/1
975 TABLET ORAL EVERY 8 HOURS
Status: DISCONTINUED | OUTPATIENT
Start: 2024-03-07 | End: 2024-03-07

## 2024-03-07 RX ORDER — HYDROCORTISONE 20 MG/1
20 TABLET ORAL EVERY MORNING
Status: DISCONTINUED | OUTPATIENT
Start: 2024-03-08 | End: 2024-03-07

## 2024-03-07 RX ORDER — SODIUM CHLORIDE 1 G/1
1 TABLET ORAL 3 TIMES DAILY
Status: DISCONTINUED | OUTPATIENT
Start: 2024-03-07 | End: 2024-03-11 | Stop reason: HOSPADM

## 2024-03-07 RX ORDER — SERTRALINE HYDROCHLORIDE 100 MG/1
100 TABLET, FILM COATED ORAL DAILY
Status: DISCONTINUED | OUTPATIENT
Start: 2024-03-08 | End: 2024-03-11 | Stop reason: HOSPADM

## 2024-03-07 RX ORDER — POTASSIUM CHLORIDE 20MEQ/15ML
40 LIQUID (ML) ORAL
Status: DISCONTINUED | OUTPATIENT
Start: 2024-03-08 | End: 2024-03-11

## 2024-03-07 RX ORDER — FERROUS SULFATE 325(65) MG
325 TABLET ORAL
Status: DISCONTINUED | OUTPATIENT
Start: 2024-03-08 | End: 2024-03-11 | Stop reason: HOSPADM

## 2024-03-07 RX ORDER — ALBUTEROL SULFATE 2.5 MG/3ML
2.5 SOLUTION RESPIRATORY (INHALATION) EVERY 6 HOURS PRN
Status: DISCONTINUED | OUTPATIENT
Start: 2024-03-07 | End: 2024-03-11 | Stop reason: HOSPADM

## 2024-03-07 RX ORDER — DEXTROSE MONOHYDRATE 25 G/50ML
25 INJECTION, SOLUTION INTRAVENOUS ONCE
Status: COMPLETED | OUTPATIENT
Start: 2024-03-07 | End: 2024-03-07

## 2024-03-07 RX ORDER — ATORVASTATIN CALCIUM 10 MG/1
10 TABLET, FILM COATED ORAL DAILY
Status: DISCONTINUED | OUTPATIENT
Start: 2024-03-08 | End: 2024-03-11 | Stop reason: HOSPADM

## 2024-03-07 RX ORDER — ACETAMINOPHEN 325 MG/1
975 TABLET ORAL EVERY 8 HOURS PRN
Status: DISCONTINUED | OUTPATIENT
Start: 2024-03-07 | End: 2024-03-11 | Stop reason: HOSPADM

## 2024-03-07 RX ORDER — ALBUTEROL SULFATE 90 UG/1
2 AEROSOL, METERED RESPIRATORY (INHALATION) EVERY 4 HOURS PRN
Status: DISCONTINUED | OUTPATIENT
Start: 2024-03-07 | End: 2024-03-11 | Stop reason: HOSPADM

## 2024-03-07 RX ORDER — FLUDROCORTISONE ACETATE 0.1 MG/1
0.2 TABLET ORAL DAILY
Status: DISCONTINUED | OUTPATIENT
Start: 2024-03-08 | End: 2024-03-11 | Stop reason: HOSPADM

## 2024-03-07 RX ORDER — DEXTROSE AND SODIUM CHLORIDE 5; .9 G/100ML; G/100ML
125 INJECTION, SOLUTION INTRAVENOUS CONTINUOUS
Status: DISPENSED | OUTPATIENT
Start: 2024-03-07 | End: 2024-03-08

## 2024-03-07 RX ORDER — DEXTROSE MONOHYDRATE 25 G/50ML
INJECTION, SOLUTION INTRAVENOUS
Status: COMPLETED
Start: 2024-03-07 | End: 2024-03-07

## 2024-03-07 RX ORDER — RISPERIDONE 1 MG/1
1 TABLET ORAL 2 TIMES DAILY
Status: DISCONTINUED | OUTPATIENT
Start: 2024-03-08 | End: 2024-03-11 | Stop reason: HOSPADM

## 2024-03-07 RX ORDER — ASPIRIN 81 MG/1
81 TABLET, CHEWABLE ORAL DAILY
Status: DISCONTINUED | OUTPATIENT
Start: 2024-03-08 | End: 2024-03-11 | Stop reason: HOSPADM

## 2024-03-07 RX ORDER — ENOXAPARIN SODIUM 100 MG/ML
1 INJECTION SUBCUTANEOUS EVERY 12 HOURS SCHEDULED
Status: DISCONTINUED | OUTPATIENT
Start: 2024-03-07 | End: 2024-03-11 | Stop reason: HOSPADM

## 2024-03-07 RX ORDER — MIDODRINE HYDROCHLORIDE 5 MG/1
15 TABLET ORAL
Status: DISCONTINUED | OUTPATIENT
Start: 2024-03-08 | End: 2024-03-11 | Stop reason: HOSPADM

## 2024-03-07 RX ORDER — CALCITRIOL 0.25 UG/1
0.25 CAPSULE, LIQUID FILLED ORAL DAILY
Status: DISCONTINUED | OUTPATIENT
Start: 2024-03-08 | End: 2024-03-11 | Stop reason: HOSPADM

## 2024-03-07 RX ADMIN — DEXTROSE AND SODIUM CHLORIDE 125 ML/HR: 5; .9 INJECTION, SOLUTION INTRAVENOUS at 19:04

## 2024-03-07 RX ADMIN — SODIUM CHLORIDE 1000 ML: 0.9 INJECTION, SOLUTION INTRAVENOUS at 17:30

## 2024-03-07 RX ADMIN — DEXTROSE MONOHYDRATE 50 ML: 25 INJECTION, SOLUTION INTRAVENOUS at 17:25

## 2024-03-07 RX ADMIN — DEXTROSE MONOHYDRATE 25 ML: 25 INJECTION, SOLUTION INTRAVENOUS at 19:04

## 2024-03-08 ENCOUNTER — APPOINTMENT (INPATIENT)
Dept: RADIOLOGY | Facility: HOSPITAL | Age: 69
DRG: 637 | End: 2024-03-08
Attending: INTERNAL MEDICINE
Payer: COMMERCIAL

## 2024-03-08 DIAGNOSIS — E10.649 TYPE 1 DIABETES MELLITUS WITH HYPOGLYCEMIA AND WITHOUT COMA (HCC): ICD-10-CM

## 2024-03-08 LAB
ALBUMIN SERPL BCP-MCNC: 3.1 G/DL (ref 3.5–5)
ALP SERPL-CCNC: 51 U/L (ref 34–104)
ALT SERPL W P-5'-P-CCNC: 10 U/L (ref 7–52)
ANION GAP SERPL CALCULATED.3IONS-SCNC: 8 MMOL/L
AST SERPL W P-5'-P-CCNC: 14 U/L (ref 13–39)
BILIRUB SERPL-MCNC: 0.62 MG/DL (ref 0.2–1)
BUN SERPL-MCNC: 18 MG/DL (ref 5–25)
CALCIUM ALBUM COR SERPL-MCNC: 8.3 MG/DL (ref 8.3–10.1)
CALCIUM SERPL-MCNC: 7.6 MG/DL (ref 8.4–10.2)
CHLORIDE SERPL-SCNC: 105 MMOL/L (ref 96–108)
CO2 SERPL-SCNC: 23 MMOL/L (ref 21–32)
CREAT SERPL-MCNC: 0.99 MG/DL (ref 0.6–1.3)
ERYTHROCYTE [DISTWIDTH] IN BLOOD BY AUTOMATED COUNT: 14.7 % (ref 11.6–15.1)
GFR SERPL CREATININE-BSD FRML MDRD: 78 ML/MIN/1.73SQ M
GLUCOSE SERPL-MCNC: 107 MG/DL (ref 65–140)
GLUCOSE SERPL-MCNC: 130 MG/DL (ref 65–140)
GLUCOSE SERPL-MCNC: 141 MG/DL (ref 65–140)
GLUCOSE SERPL-MCNC: 154 MG/DL (ref 65–140)
GLUCOSE SERPL-MCNC: 158 MG/DL (ref 65–140)
GLUCOSE SERPL-MCNC: 165 MG/DL (ref 65–140)
GLUCOSE SERPL-MCNC: 175 MG/DL (ref 65–140)
GLUCOSE SERPL-MCNC: 178 MG/DL (ref 65–140)
GLUCOSE SERPL-MCNC: 180 MG/DL (ref 65–140)
GLUCOSE SERPL-MCNC: 183 MG/DL (ref 65–140)
GLUCOSE SERPL-MCNC: 212 MG/DL (ref 65–140)
GLUCOSE SERPL-MCNC: 219 MG/DL (ref 65–140)
HCT VFR BLD AUTO: 31.6 % (ref 36.5–49.3)
HGB BLD-MCNC: 10.1 G/DL (ref 12–17)
MAGNESIUM SERPL-MCNC: 1.3 MG/DL (ref 1.9–2.7)
MCH RBC QN AUTO: 31.7 PG (ref 26.8–34.3)
MCHC RBC AUTO-ENTMCNC: 32 G/DL (ref 31.4–37.4)
MCV RBC AUTO: 99 FL (ref 82–98)
PHOSPHATE SERPL-MCNC: 2.8 MG/DL (ref 2.3–4.1)
PLATELET # BLD AUTO: 135 THOUSANDS/UL (ref 149–390)
PMV BLD AUTO: 11.3 FL (ref 8.9–12.7)
POTASSIUM SERPL-SCNC: 3.5 MMOL/L (ref 3.5–5.3)
PROT SERPL-MCNC: 5.2 G/DL (ref 6.4–8.4)
RBC # BLD AUTO: 3.19 MILLION/UL (ref 3.88–5.62)
SODIUM SERPL-SCNC: 136 MMOL/L (ref 135–147)
WBC # BLD AUTO: 6.52 THOUSAND/UL (ref 4.31–10.16)

## 2024-03-08 PROCEDURE — 97167 OT EVAL HIGH COMPLEX 60 MIN: CPT

## 2024-03-08 PROCEDURE — 99222 1ST HOSP IP/OBS MODERATE 55: CPT | Performed by: INTERNAL MEDICINE

## 2024-03-08 PROCEDURE — 92611 MOTION FLUOROSCOPY/SWALLOW: CPT | Performed by: SPEECH-LANGUAGE PATHOLOGIST

## 2024-03-08 PROCEDURE — 84100 ASSAY OF PHOSPHORUS: CPT | Performed by: INTERNAL MEDICINE

## 2024-03-08 PROCEDURE — 36415 COLL VENOUS BLD VENIPUNCTURE: CPT | Performed by: INTERNAL MEDICINE

## 2024-03-08 PROCEDURE — 74230 X-RAY XM SWLNG FUNCJ C+: CPT

## 2024-03-08 PROCEDURE — 82948 REAGENT STRIP/BLOOD GLUCOSE: CPT

## 2024-03-08 PROCEDURE — 97163 PT EVAL HIGH COMPLEX 45 MIN: CPT

## 2024-03-08 PROCEDURE — 85027 COMPLETE CBC AUTOMATED: CPT | Performed by: INTERNAL MEDICINE

## 2024-03-08 PROCEDURE — 99232 SBSQ HOSP IP/OBS MODERATE 35: CPT | Performed by: PHYSICIAN ASSISTANT

## 2024-03-08 PROCEDURE — 97110 THERAPEUTIC EXERCISES: CPT

## 2024-03-08 PROCEDURE — 80053 COMPREHEN METABOLIC PANEL: CPT | Performed by: INTERNAL MEDICINE

## 2024-03-08 PROCEDURE — 83735 ASSAY OF MAGNESIUM: CPT | Performed by: INTERNAL MEDICINE

## 2024-03-08 RX ORDER — HYDROCORTISONE 20 MG/1
40 TABLET ORAL EVERY 24 HOURS
Status: DISCONTINUED | OUTPATIENT
Start: 2024-03-09 | End: 2024-03-10

## 2024-03-08 RX ORDER — MAGNESIUM SULFATE HEPTAHYDRATE 40 MG/ML
4 INJECTION, SOLUTION INTRAVENOUS ONCE
Status: COMPLETED | OUTPATIENT
Start: 2024-03-08 | End: 2024-03-08

## 2024-03-08 RX ORDER — INSULIN GLARGINE 100 [IU]/ML
10 INJECTION, SOLUTION SUBCUTANEOUS EVERY MORNING
Status: DISCONTINUED | OUTPATIENT
Start: 2024-03-08 | End: 2024-03-10

## 2024-03-08 RX ORDER — INSULIN LISPRO 100 [IU]/ML
1-5 INJECTION, SOLUTION INTRAVENOUS; SUBCUTANEOUS
Status: DISCONTINUED | OUTPATIENT
Start: 2024-03-08 | End: 2024-03-09

## 2024-03-08 RX ORDER — INSULIN LISPRO 100 [IU]/ML
INJECTION, SOLUTION INTRAVENOUS; SUBCUTANEOUS
Qty: 30 ML | Refills: 0 | Status: SHIPPED | OUTPATIENT
Start: 2024-03-08 | End: 2024-03-11

## 2024-03-08 RX ORDER — FLUDROCORTISONE ACETATE 0.1 MG/1
0.2 TABLET ORAL DAILY
Qty: 60 TABLET | Refills: 5 | Status: SHIPPED | OUTPATIENT
Start: 2024-03-08

## 2024-03-08 RX ADMIN — ENOXAPARIN SODIUM 80 MG: 80 INJECTION SUBCUTANEOUS at 00:06

## 2024-03-08 RX ADMIN — ATORVASTATIN CALCIUM 10 MG: 10 TABLET, FILM COATED ORAL at 14:47

## 2024-03-08 RX ADMIN — SERTRALINE 100 MG: 100 TABLET, FILM COATED ORAL at 14:48

## 2024-03-08 RX ADMIN — RISPERIDONE 1 MG: 1 TABLET ORAL at 17:16

## 2024-03-08 RX ADMIN — INSULIN LISPRO 2 UNITS: 100 INJECTION, SOLUTION INTRAVENOUS; SUBCUTANEOUS at 17:15

## 2024-03-08 RX ADMIN — SODIUM CHLORIDE 1 G: 1 TABLET ORAL at 21:10

## 2024-03-08 RX ADMIN — SODIUM CHLORIDE 1 G: 1 TABLET ORAL at 17:16

## 2024-03-08 RX ADMIN — INSULIN GLARGINE 10 UNITS: 100 INJECTION, SOLUTION SUBCUTANEOUS at 11:49

## 2024-03-08 RX ADMIN — FERROUS SULFATE TAB 325 MG (65 MG ELEMENTAL FE) 325 MG: 325 (65 FE) TAB at 14:48

## 2024-03-08 RX ADMIN — HYDROCORTISONE SODIUM SUCCINATE 30 MG: 100 INJECTION, POWDER, FOR SOLUTION INTRAMUSCULAR; INTRAVENOUS at 14:48

## 2024-03-08 RX ADMIN — MAGNESIUM SULFATE HEPTAHYDRATE 4 G: 40 INJECTION, SOLUTION INTRAVENOUS at 12:10

## 2024-03-08 RX ADMIN — Medication 100 MCG: at 14:47

## 2024-03-08 RX ADMIN — ENOXAPARIN SODIUM 80 MG: 80 INJECTION SUBCUTANEOUS at 21:10

## 2024-03-08 RX ADMIN — FLUDROCORTISONE ACETATE 0.2 MG: 0.1 TABLET ORAL at 14:56

## 2024-03-08 RX ADMIN — HYDROCORTISONE SODIUM SUCCINATE 15 MG: 100 INJECTION, POWDER, FOR SOLUTION INTRAMUSCULAR; INTRAVENOUS at 00:05

## 2024-03-08 RX ADMIN — POTASSIUM CHLORIDE 40 MEQ: 1.5 SOLUTION ORAL at 14:47

## 2024-03-08 RX ADMIN — DEXTROSE AND SODIUM CHLORIDE 125 ML/HR: 5; .9 INJECTION, SOLUTION INTRAVENOUS at 03:36

## 2024-03-08 RX ADMIN — SODIUM CHLORIDE 1 G: 1 TABLET ORAL at 14:48

## 2024-03-08 RX ADMIN — INSULIN LISPRO 1 UNITS: 100 INJECTION, SOLUTION INTRAVENOUS; SUBCUTANEOUS at 11:49

## 2024-03-08 RX ADMIN — DEXTROSE AND SODIUM CHLORIDE 125 ML/HR: 5; .9 INJECTION, SOLUTION INTRAVENOUS at 11:15

## 2024-03-08 RX ADMIN — ENOXAPARIN SODIUM 80 MG: 80 INJECTION SUBCUTANEOUS at 11:50

## 2024-03-08 RX ADMIN — CALCITRIOL 0.25 MCG: 0.25 CAPSULE, LIQUID FILLED ORAL at 14:47

## 2024-03-08 RX ADMIN — RISPERIDONE 1 MG: 1 TABLET ORAL at 14:47

## 2024-03-08 RX ADMIN — ASPIRIN 81 MG CHEWABLE TABLET 81 MG: 81 TABLET CHEWABLE at 14:47

## 2024-03-08 RX ADMIN — HYDROCORTISONE SODIUM SUCCINATE 40 MG: 100 INJECTION, POWDER, FOR SOLUTION INTRAMUSCULAR; INTRAVENOUS at 11:50

## 2024-03-08 NOTE — UTILIZATION REVIEW
Initial Clinical Review    Admission: Date/Time/Statement:   Admission Orders (From admission, onward)       Ordered        03/07/24 1944  INPATIENT ADMISSION  Once                          Orders Placed This Encounter   Procedures    INPATIENT ADMISSION     Standing Status:   Standing     Number of Occurrences:   1     Order Specific Question:   Level of Care     Answer:   Level 2 Stepdown / HOT [14]     Order Specific Question:   Estimated length of stay     Answer:   More than 2 Midnights     Order Specific Question:   Certification     Answer:   I certify that inpatient services are medically necessary for this patient for a duration of greater than two midnights. See H&P and MD Progress Notes for additional information about the patient's course of treatment.     ED Arrival Information       Expected   -    Arrival   3/7/2024 16:39    Acuity   Urgent              Means of arrival   Walk-In    Escorted by   Family Member    Service   Hospitalist    Admission type   Emergency              Arrival complaint   vomiting             Chief Complaint   Patient presents with    Flu Symptoms     Not feeling the best started this am, fevers today and taken tylenol. BS have been low for him 75. + vomited once and one loose stool. Generalized weakness doesn't want to walk and no urination today.        Initial Presentation: 69 y.o. male w/ PMH of T1DM, PAF, CVA, adrenal insufficiency, orthostatic hypotension, chronic anemia, hyperlipidemia and CAD presented to the ED from home w/  confusion, generalized weakness, coughing, swallowing difficulty, minimal urine output ,reported fever of 102 at home and 1 episode of vomiting and loose stool. All symptoms started today.   In the ED, labs noted for hypoglycemia of 35, mild leukocytosis and creatinine- 1.47, baseline 0.9-1.4.  EKG with nonspecific T changes.   On exam, ill appearing, Slightly confused. Oriented to time place. Slow in responding to questions. Noted slight slurred  speech.   Given D50, 1L IVF bolus, D5w, IV Solu Cortef.    Admit as Inpatient for evaluation and treatment of T1DM w/ hypoglycemia, acute metabolic encephalopathy, elevated sCreatinine, elevated trops.  Plan: started on D5 normal saline.  Monitor glucose level every hour for now. Obtain CT head. obtain infectious workup with UA,  blood cultures and stool studies. F/u formal CXR read.  n.p.o. until obtaining bedside swallowing test.  Monitor CBC and BMP. Neurochecks. Mon creatinine w/ IVFs.     Date: 03/08   Day 2: Pt awake and alert. CT head: No acute findings.CXR obtained without evidence of infectious process. Creatinine improved. failed bedside speech eval. Maintain NPO status for now. Plan for VBS. Magnesium level low @ 1.3. Replete with IV supplementation and monitor. failed urinary retention protocol so has beebe catheter in place. Will need voiding trial prior to discharge.     Endo Consult:Type 1 diabetes with hyperglycemia: Continue Lantus 10 units every morning. Correctional scale has been started.  ensure patient continues to receive basal insulin.can stop dextrose containing IV fluids and monitor over time. Will need adjustment in insulin dosing such as addition of prandial insulin as p.o. intake and blood sugar trends progress.  Adrenal insufficiency: Hydrocortisone dose has been doubled and this can be changed back to baseline dose if remains hemodynamically stable after 1-2 days.  Continue fludrocortisone.     Day 3: Has surpassed a 2nd midnight with active treatments and services.  Pt's MS improved. VBS done. started on level 3 dysphagia with thin liquids. Tolerating diet. Creatinine improved. BS improving, cont to trend. Will readjust Insulin dosing as po intake and BS trends progress. Cont to mon electrolytes and replete prn. Cont midodrine, salt tabs, po hydrocortisone. W/ beebe cath, will need voiding trial prior to dc. BP elevated ovn, given Hydralazine 5 mg IV once w/ improvement in BP this am.  Cont to mon BP.    ED Triage Vitals   Temperature Pulse Respirations Blood Pressure SpO2   03/07/24 1646 03/07/24 1646 03/07/24 1646 03/07/24 1646 03/07/24 1646   98.5 °F (36.9 °C) 82 20 108/52 100 %      Temp Source Heart Rate Source Patient Position - Orthostatic VS BP Location FiO2 (%)   03/07/24 1646 03/07/24 1646 03/07/24 1646 03/07/24 1646 --   Tympanic Monitor Lying Right arm       Pain Score       03/08/24 0740       No Pain          Wt Readings from Last 1 Encounters:   03/08/24 81.8 kg (180 lb 4.8 oz)     Additional Vital Signs:   Date/Time Temp Pulse Resp BP MAP (mmHg) SpO2 O2 Device Patient Position - Orthostatic VS   03/08/24 1458 -- 77 18 162/72 104 -- -- Sitting   03/08/24 11:48:39 98.9 °F (37.2 °C) 74 18 149/67 96 -- -- Sitting   03/08/24 07:40:11 98.9 °F (37.2 °C) -- -- -- -- -- -- --   03/08/24 0740 98.9 °F (37.2 °C) 79 -- 143/70 100 -- None (Room air) Sitting   03/08/24 0700 -- 83 22 148/70 101 97 % None (Room air) Lying   03/08/24 0600 -- 82 22 173/79 Abnormal  113 96 % -- --   03/08/24 0500 -- 85 17 176/81 Abnormal   116 97 % -- --   BP: dr marcano aware at 03/08/24 0500   03/08/24 0300 -- 81 17 158/73 105 96 % -- --   03/08/24 0200 -- 84 20 166/72 103 96 % -- --   03/08/24 0100 -- 84 12 154/78 109 97 % -- --   03/07/24 2330 -- 80 22 164/76 109 98 % -- --   03/07/24 2130 -- 73 19 137/65 93 99 % None (Room air) --   03/07/24 1945 -- 72 24 Abnormal  127/58 84 97 % -- --   03/07/24 1915 -- 71 -- 93/54 69 97 % None (Room air) --   03/07/24 1725 -- -- -- -- -- -- None (Room air) --       Pertinent Labs/Diagnostic Test Results:   FL barium swallow video w speech   Final Result by  (03/08 1353)      FL barium swallow video w speech   Final Result by SYSTEMGENERATED, DOCUMENTATION (03/08 6278)      CT head wo contrast   Final Result by Werner Harvey MD (03/07 2059)      No acute intracranial abnormality.                  Workstation performed: [id number]         XR chest 1 view portable   ED  Interpretation by Karel Goins MD (03/07 1854)   No acute infiltrates especially looking in the right lower and left lower lobes.  No obvious intrathoracic pathology on this limited 1 view x-ray.      Final Result by Moe Barrios MD (03/08 0805)      No acute cardiopulmonary disease.      Findings are stable      Workstation performed: [id number]           03/07 EKG result: Sinus rhythm with Premature atrial complexes  T wave abnormality, consider lateral ischemia  Prolonged QT    Results from last 7 days   Lab Units 03/07/24  1728   SARS-COV-2  Negative     Results from last 7 days   Lab Units 03/09/24  0446 03/08/24  0532 03/07/24  1728   WBC Thousand/uL 4.73 6.52 11.29*   HEMOGLOBIN g/dL 9.7* 10.1* 11.9*   HEMATOCRIT % 30.7* 31.6* 36.4*   PLATELETS Thousands/uL 105* 135* 205   NEUTROS ABS Thousands/µL 2.78  --   --    BANDS PCT %  --   --  5         Results from last 7 days   Lab Units 03/09/24  0446 03/08/24  0532 03/07/24  1728   SODIUM mmol/L 141 136 134*   POTASSIUM mmol/L 3.4* 3.5 3.6   CHLORIDE mmol/L 107 105 100   CO2 mmol/L 24 23 26   ANION GAP mmol/L 10 8 8   BUN mg/dL 13 18 22   CREATININE mg/dL 0.81 0.99 1.47*   EGFR ml/min/1.73sq m 91 78 48   CALCIUM mg/dL 7.9* 7.6* 8.7   MAGNESIUM mg/dL 1.9 1.3*  --    PHOSPHORUS mg/dL  --  2.8  --      Results from last 7 days   Lab Units 03/08/24  0532 03/07/24  1728   AST U/L 14 12*   ALT U/L 10 17   ALK PHOS U/L 51 70   TOTAL PROTEIN g/dL 5.2* 6.7   ALBUMIN g/dL 3.1* 4.0   TOTAL BILIRUBIN mg/dL 0.62 0.66     Results from last 7 days   Lab Units 03/10/24  0625 03/09/24  2126 03/09/24  1605 03/09/24  1135 03/09/24  0610 03/08/24 2018 03/08/24  1531 03/08/24  1040 03/08/24  0834 03/08/24  0627 03/08/24  0528 03/08/24  0438   POC GLUCOSE mg/dl 203* 302* 294* 238* 160* 212* 219* 178* 158* 180* 175* 165*     Results from last 7 days   Lab Units 03/09/24  0446 03/08/24  0532 03/07/24  1728   GLUCOSE RANDOM mg/dL 153* 183* 32*             Results from last  7 days   Lab Units 03/07/24  2213 03/07/24  1728   HS TNI 0HR ng/L  --  67*   HS TNI 2HR ng/L 54*  --    HSTNI D2 ng/L -13  --          Results from last 7 days   Lab Units 03/07/24  1728   PROTIME seconds 15.5*   INR  1.24*         Results from last 7 days   Lab Units 03/07/24  1728   PROCALCITONIN ng/ml 1.08*     Results from last 7 days   Lab Units 03/07/24  1728   LACTIC ACID mmol/L 1.7             Results from last 7 days   Lab Units 03/07/24  2128   CLARITY UA  Clear   COLOR UA  Light Yellow   SPEC GRAV UA  1.007   PH UA  5.5   GLUCOSE UA mg/dl Negative   KETONES UA mg/dl Negative   BLOOD UA  Moderate*   PROTEIN UA mg/dl Negative   NITRITE UA  Negative   BILIRUBIN UA  Negative   UROBILINOGEN UA (BE) mg/dl <2.0   LEUKOCYTES UA  Negative   WBC UA /hpf None Seen   RBC UA /hpf 1-2   BACTERIA UA /hpf None Seen   EPITHELIAL CELLS WET PREP /hpf None Seen     Results from last 7 days   Lab Units 03/07/24  1728   INFLUENZA A PCR  Negative   INFLUENZA B PCR  Negative   RSV PCR  Negative                             Results from last 7 days   Lab Units 03/07/24  1939 03/07/24  1700   BLOOD CULTURE  No Growth at 48 hrs. No Growth at 48 hrs.                   ED Treatment:   Medication Administration from 03/07/2024 1639 to 03/08/2024 0726         Date/Time Order Dose Route Action     03/07/2024 1725 EST dextrose 50 % IV solution **ADS Override Pull** 50 mL  Given     03/07/2024 1900 EST sodium chloride 0.9 % bolus 1,000 mL 0 mL Intravenous Stopped     03/07/2024 1730 EST sodium chloride 0.9 % bolus 1,000 mL 1,000 mL Intravenous New Bag     03/08/2024 0336 EST dextrose 5 % and sodium chloride 0.9 % infusion 125 mL/hr Intravenous New Bag     03/07/2024 1904 EST dextrose 5 % and sodium chloride 0.9 % infusion 125 mL/hr Intravenous New Bag     03/07/2024 1904 EST dextrose 50 % IV solution 25 mL 25 mL Intravenous Given by Other     03/08/2024 0005 EST hydrocortisone (Solu-CORTEF) injection 15 mg 15 mg Intravenous Given      03/08/2024 0006 EST enoxaparin (LOVENOX) subcutaneous injection 80 mg 80 mg Subcutaneous Given          Past Medical History:   Diagnosis Date    A-fib (HCC)     Adrenal insufficiency (HCC)     Atrial fibrillation (HCC)     Diabetes mellitus (HCC)     Obesity, morbid (HCC) 11/14/2022    Stroke (HCC)      Present on Admission:   Acute metabolic encephalopathy   Adrenal insufficiency (HCC)   Orthostatic hypotension   Paroxysmal atrial fibrillation (HCC)   Type 1 diabetes mellitus with hypoglycemia (HCC)   Elevated troponin level not due myocardial infarction   Hypomagnesemia   Hypokalemia      Admitting Diagnosis: Weakness [R53.1]  Hypoglycemia [E16.2]  Flu-like symptoms [R68.89]  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 male  Admission Orders:  SCD  PT/OT/ST  Continuous Pulse Ox  Neuro checks   Nsg dysphagia screen  I/O    Scheduled Medications:  aspirin, 81 mg, Oral, Daily  atorvastatin, 10 mg, Oral, Daily  calcitriol, 0.25 mcg, Oral, Daily  cyanocobalamin, 100 mcg, Oral, Daily  enoxaparin, 1 mg/kg, Subcutaneous, Q12H RADAMES  ferrous sulfate, 325 mg, Oral, Daily With Breakfast  fludrocortisone, 0.2 mg, Oral, Daily  hydrALAZINE (APRESOLINE) injection 5 mg IV once 03/10  hydrocortisone (Solu-CORTEF) injection 15 mg IV every evening given 03/08 x 1 then dc'd 03/08   hydrocortisone (Solu-CORTEF) injection 30 mg q24h IV given on 03/08 x 1 then dc'd 03/08  hydrocortisone (Solu-CORTEF) injection 40 mg every am given 03/08 x 1 then dc'd on 03/08  hydrocortisone, 30 mg, Oral, Q24H  hydrocortisone, 40 mg, Oral, Q24H  insulin glargine, 10 Units, Subcutaneous, QAM  insulin lispro, 1-5 Units, Subcutaneous, 4x Daily (AC & HS)  magnesium sulfate 4 g/100 mL IVPB (premix) 4 g IV once  midodrine, 15 mg, Oral, TID AC  potassium chloride, 40 mEq, Oral, Daily With Breakfast  risperiDONE, 1 mg, Oral, BID  sertraline, 100 mg, Oral, Daily  sodium chloride, 1 g, Oral, TID      Continuous IV  Infusions:  dextrose 5 % and sodium chloride 0.9 % infusion  Rate: 125 mL/hr Dose: 125 mL/hr  Freq: Continuous Route: IV  Indications of Use: IV Hydration  Last Dose: Stopped (03/08/24 1648)  Start: 03/07/24 1915 End: 03/08/24 1700        PRN Meds:  acetaminophen, 975 mg, Oral, Q8H PRN 03/10/ x 1  albuterol, 2 puff, Inhalation, Q4H PRN  albuterol, 2.5 mg, Nebulization, Q6H PRN        IP CONSULT TO ENDOCRINOLOGY    Network Utilization Review Department  ATTENTION: Please call with any questions or concerns to [phone] and carefully listen to the prompts so that you are directed to the right person. All voicemails are confidential.   For Discharge needs, contact Care Management DC Support Team at [phone] opt. [phone]  Send all requests for admission clinical reviews, approved or denied determinations and any other requests to dedicated fax number below belonging to the [facility] where the patient is receiving treatment. List of dedicated fax numbers for the Facilities:  FACILITY NAME UR FAX NUMBER   ADMISSION DENIALS (Administrative/Medical Necessity) [phone]   DISCHARGE SUPPORT TEAM (NETWORK) [phone]   PARENT CHILD HEALTH (Maternity/NICU/Pediatrics) [phone]   [facility] [phone]   [facility] [phone]   [facility] [phone]   [facility] [phone]   [facility] [phone]   [facility] [phone]   [facility] [phone]   [facility] [phone]   [facility] [phone]   [facility] [phone]   [facility] [phone]   [facility] [phone]

## 2024-03-08 NOTE — ASSESSMENT & PLAN NOTE
Continue Florinef 0.2 mg daily and Cortef 20 mg in a.m. and 50 mg in p.m.  On midodrine and salt tablets for orthostatic hypotension

## 2024-03-08 NOTE — ED PROVIDER NOTES
History  Chief Complaint   Patient presents with    Flu Symptoms     Not feeling the best started this am, fevers today and taken tylenol. BS have been low for him 75. + vomited once and one loose stool. Generalized weakness doesn't want to walk and no urination today.      This is a 69-year-old male with history of atrial fibrillation (on Eliquis), adrenal insufficiency, diabetes, obesity, and a prior stroke which left him with deficits of generalized bilateral weakness, memory problems, and some chronic dysphagia.  He is presenting today due to concern for 1 day of decreased activity, worse than normal dysphagia, and hypoglycemic episodes at home.  His daughter is his primary caretaker now at home who reports that he is acting more or less like his normal self though significantly less active than normal.  He is usually able to carry a good conversation however today he speaks in short sentences and phrases though he is oriented.  She did feel a tactile warmth earlier today but had not checked a temperature.  He is afebrile here in triage.  He has no complaints personally at this time other than generalized weakness.  Unfortunately today his blood sugars were dropping much lower than their normal range of 200s as low as 75 on home glucometer.  However in triage here the patient was found to have a blood glucose in the 50s.  He was given an amp of D50 shortly after arrival here in the emergency department.  Patient has not had recurrent hypoglycemia recently.  He is on a medium to long acting insulin at night as well as short acting insulin at mealtime which she received all doses of over the last 24 hours without any extra doses or carb correction.        Prior to Admission Medications   Prescriptions Last Dose Informant Patient Reported? Taking?   Continuous Blood Gluc  (Dexcom G7 ) CYN   No No   Sig: Use 1 each continuous   Continuous Blood Gluc Sensor (Dexcom G7 Sensor)   No No   Sig: Use 1  Device every 10 days   Insulin Pen Needle (BD Pen Needle Josie 2nd Gen) 32G X 4 MM MISC   No No   Sig: FOR USE WITH INSULIN PEN. PHARMACY MAY DISPENSE BRAND COVERED BY INSURANCE.   Insulin Pen Needle (BD Pen Needle Ojsie 2nd Gen) 32G X 4 MM MISC   No No   Sig: For use with insulin pen 4 times daily. Pharmacy may dispense brand covered by insurance.   Toujeo SoloStar 300 units/mL CONCENTRATED U-300 injection pen (1-unit dial)   No No   Sig: Inject 20 Units under the skin daily at bedtime   Patient taking differently: Inject 18 Units under the skin daily at bedtime Per Dr. Mckayla DAVILA from [facility] 1/5/24. List uploaded into [employer].   acetaminophen (TYLENOL) 325 mg tablet   No No   Sig: Take 3 tablets (975 mg total) by mouth every 8 (eight) hours   albuterol (2.5 mg/3 mL) 0.083 % nebulizer solution   No No   Sig: Take 3 mL (2.5 mg total) by nebulization every 6 (six) hours as needed for wheezing or shortness of breath   albuterol (PROVENTIL HFA,VENTOLIN HFA) 90 mcg/act inhaler   No No   Sig: Inhale 2 puffs every 4 (four) hours as needed for wheezing   apixaban (Eliquis) 5 mg   No No   Sig: Take 1 tablet (5 mg total) by mouth 2 (two) times a day   aspirin 81 mg chewable tablet   Yes No   Sig: Chew 81 mg daily   atorvastatin (LIPITOR) 10 mg tablet  Child Yes No   Sig: Take 1 tablet by mouth daily   calcitriol (ROCALTROL) 0.25 mcg capsule   No No   Sig: Take 1 capsule (0.25 mcg total) by mouth daily Do not start before November 15, 2023.   cyanocobalamin (VITAMIN B-12) 100 MCG tablet   No No   Sig: Take 1 tablet (100 mcg total) by mouth daily Do not start before November 15, 2023.   ferrous sulfate 324 (65 Fe) mg   No No   Sig: TAKE 1 TABLET (324 MG TOTAL) BY MOUTH DAILY BEFORE BREAKFAST   fludrocortisone (FLORINEF) 0.1 mg tablet   No No   Sig: Take 2 tablets (0.2 mg total) by mouth daily   hydrocortisone (CORTEF) 5 mg tablet   No No   Sig: Use 4tabs (20mg) in morning and 3 tabs (15mg) daily afternoon Do not  start before December 22, 2023.   insulin glargine (Toujeo Max SoloStar) 300 units/mL CONCENTRATED U-300 injection pen (2-unit dial)   No No   Sig: Inject 18 Units under the skin daily before breakfast   insulin lispro (HumALOG/ADMELOG) 100 units/mL injection   No No   Sig: Use 9u before each meal plus 1u/60 above 150mg/dL; max dose 40u/day   magnesium Oxide (MAG-OX) 400 mg TABS   No No   Sig: Take 1 tablet (400 mg total) by mouth 2 (two) times a day   midodrine (PROAMATINE) 10 MG tablet   No No   Sig: Take 1.5 tablets (15 mg total) by mouth 3 (three) times a day before meals   Patient taking differently: Take 15 mg by mouth 3 (three) times a day before meals Hold for SBP under 140 or DBP under 90  Per Dr. Mckayla DAVILA from [facility] 1/5/24. List uploaded into [employer].   potassium chloride (Klor-Con) 10 mEq tablet  Child Yes No   Sig: Take 40 mEq by mouth 2 (two) times a day Per Dr. Mckayla DAVILA from [facility] 1/5/24. List uploaded into [employer].  Do not start before January 3, 2024.   risperiDONE (RisperDAL) 1 mg tablet  Child Yes No   Sig: Take 1 tablet by mouth 2 (two) times a day   sertraline (ZOLOFT) 100 mg tablet  Child Yes No   Sig: Take 100 mg by mouth daily   sodium chloride 1 g tablet   No No   Sig: Take 1 tablet (1 g total) by mouth 3 (three) times a day      Facility-Administered Medications: None       Past Medical History:   Diagnosis Date    A-fib (HCC)     Adrenal insufficiency (HCC)     Atrial fibrillation (HCC)     Diabetes mellitus (HCC)     Obesity, morbid (HCC) 11/14/2022    Stroke (HCC)        History reviewed. No pertinent surgical history.    Family History   Problem Relation Age of Onset    Heart disease Mother     Cancer Father     Multiple sclerosis Sister      I have reviewed and agree with the history as documented.    E-Cigarette/Vaping    E-Cigarette Use Never User      E-Cigarette/Vaping Substances    Nicotine No     THC No     CBD No     Flavoring No     Other No      Unknown No      Social History     Tobacco Use    Smoking status: Former     Current packs/day: 0.25     Average packs/day: 0.3 packs/day for 30.0 years (7.5 ttl pk-yrs)     Types: Cigarettes    Smokeless tobacco: Never   Vaping Use    Vaping status: Never Used   Substance Use Topics    Alcohol use: Not Currently     Alcohol/week: 5.0 standard drinks of alcohol     Types: 5 Cans of beer per week     Comment: Quit in august 2022    Drug use: Never        Review of Systems   Constitutional:  Positive for activity change, appetite change and fatigue.   HENT:  Positive for trouble swallowing.    Respiratory:  Positive for choking. Negative for cough and wheezing.    Genitourinary:  Positive for decreased urine volume.   Neurological:  Positive for weakness. Negative for facial asymmetry.       Physical Exam  ED Triage Vitals [03/07/24 1646]   Temperature Pulse Respirations Blood Pressure SpO2   98.5 °F (36.9 °C) 82 20 108/52 100 %      Temp Source Heart Rate Source Patient Position - Orthostatic VS BP Location FiO2 (%)   Tympanic Monitor Lying Right arm --      Pain Score       --             Orthostatic Vital Signs  Vitals:    03/07/24 1646 03/07/24 1915 03/07/24 1945 03/07/24 2130   BP: 108/52 93/54 127/58 137/65   Pulse: 82 71 72 73   Patient Position - Orthostatic VS: Lying          Physical Exam  Vitals and nursing note reviewed.   Constitutional:       General: He is not in acute distress.     Appearance: He is well-developed. He is obese. He is not toxic-appearing or diaphoretic.   HENT:      Head: Normocephalic and atraumatic.      Right Ear: External ear normal.      Left Ear: External ear normal.      Nose: Nose normal. No congestion or rhinorrhea.      Mouth/Throat:      Mouth: Mucous membranes are moist.      Pharynx: No oropharyngeal exudate or posterior oropharyngeal erythema.   Eyes:      General: No scleral icterus.     Extraocular Movements: Extraocular movements intact.      Conjunctiva/sclera:  Conjunctivae normal.      Pupils: Pupils are equal, round, and reactive to light.   Cardiovascular:      Rate and Rhythm: Normal rate and regular rhythm.      Pulses: Normal pulses.      Heart sounds: No murmur heard.  Pulmonary:      Effort: Pulmonary effort is normal. No respiratory distress.      Breath sounds: Normal breath sounds. No wheezing or rales.      Comments: Patient has some upper airway congestion noises especially when he swallows his saliva, however no rhonchi in the bases, no rales, no wheezing appreciated  Abdominal:      Palpations: Abdomen is soft. There is no mass.      Tenderness: There is no abdominal tenderness. There is no right CVA tenderness, left CVA tenderness or guarding.      Hernia: No hernia is present.   Musculoskeletal:         General: No swelling. Normal range of motion.      Cervical back: Normal range of motion and neck supple.      Right lower leg: No edema.      Left lower leg: No edema.   Skin:     General: Skin is warm and dry.      Capillary Refill: Capillary refill takes 2 to 3 seconds.      Coloration: Skin is pale.   Neurological:      General: No focal deficit present.      Mental Status: He is alert.         ED Medications  Medications   dextrose 5 % and sodium chloride 0.9 % infusion (125 mL/hr Intravenous New Bag 3/7/24 7134)   albuterol inhalation solution 2.5 mg (has no administration in time range)   albuterol (PROVENTIL HFA,VENTOLIN HFA) inhaler 2 puff (has no administration in time range)   aspirin chewable tablet 81 mg (has no administration in time range)   atorvastatin (LIPITOR) tablet 10 mg (has no administration in time range)   calcitriol (ROCALTROL) capsule 0.25 mcg (has no administration in time range)   ferrous sulfate tablet 325 mg (has no administration in time range)   cyanocobalamin (VITAMIN B-12) tablet 100 mcg (has no administration in time range)   sertraline (ZOLOFT) tablet 100 mg (has no administration in time range)   risperiDONE (RisperDAL)  tablet 1 mg (has no administration in time range)   sodium chloride tablet 1 g (1 g Oral Not Given 3/7/24 2305)   midodrine (PROAMATINE) tablet 15 mg (has no administration in time range)   fludrocortisone (FLORINEF) tablet 0.2 mg (has no administration in time range)   potassium chloride oral solution 40 mEq (has no administration in time range)   acetaminophen (TYLENOL) tablet 975 mg (has no administration in time range)   hydrocortisone (Solu-CORTEF) injection 20 mg (has no administration in time range)   hydrocortisone (Solu-CORTEF) injection 15 mg (has no administration in time range)   enoxaparin (LOVENOX) subcutaneous injection 80 mg (has no administration in time range)   dextrose 50 % IV solution **ADS Override Pull** (50 mL  Given 3/7/24 1725)   sodium chloride 0.9 % bolus 1,000 mL (0 mL Intravenous Stopped 3/7/24 1900)   dextrose 50 % IV solution 25 mL (25 mL Intravenous Given by Other 3/7/24 1904)       Diagnostic Studies  Results Reviewed       Procedure Component Value Units Date/Time    HS Troponin I 2hr [[id number]]  (Abnormal) Collected: 03/07/24 2213    Lab Status: Final result Specimen: Blood from Arm, Left Updated: 03/07/24 2311     hs TnI 2hr 54 ng/L      Delta 2hr hsTnI -13 ng/L     Fingerstick Glucose (POCT) [[id number]]  (Normal) Collected: 03/07/24 2213    Lab Status: Final result Specimen: Blood Updated: 03/07/24 2214     POC Glucose 110 mg/dl     Urine Microscopic [[id number]]  (Normal) Collected: 03/07/24 2128    Lab Status: Final result Specimen: Urine, Straight Cath Updated: 03/07/24 2146     RBC, UA 1-2 /hpf      WBC, UA None Seen /hpf      Epithelial Cells None Seen /hpf      Bacteria, UA None Seen /hpf     UA w Reflex to Microscopic w Reflex to Culture [[id number]]  (Abnormal) Collected: 03/07/24 2128    Lab Status: Final result Specimen: Urine, Straight Cath Updated: 03/07/24 2144     Color, UA Light Yellow     Clarity, UA Clear     Specific Gravity, UA 1.007     pH, UA 5.5      Leukocytes, UA Negative     Nitrite, UA Negative     Protein, UA Negative mg/dl      Glucose, UA Negative mg/dl      Ketones, UA Negative mg/dl      Urobilinogen, UA <2.0 mg/dl      Bilirubin, UA Negative     Occult Blood, UA Moderate    Fingerstick Glucose (POCT) [[id number]]  (Normal) Collected: 03/07/24 2043    Lab Status: Final result Specimen: Blood Updated: 03/07/24 2045     POC Glucose 95 mg/dl     Stool Enteric Bacterial Panel by PCR [[id number]]     Lab Status: No result Specimen: Stool     Clostridium difficile toxin by PCR with EIA [[id number]]     Lab Status: No result Specimen: Stool     Blood culture [[id number]] Collected: 03/07/24 1700    Lab Status: In process Specimen: Blood from Arm, Left Updated: 03/07/24 1944    Blood culture [[id number]] Collected: 03/07/24 1939    Lab Status: In process Specimen: Blood from Arm, Left Updated: 03/07/24 1944    Fingerstick Glucose (POCT) [[id number]]  (Normal) Collected: 03/07/24 1932    Lab Status: Final result Specimen: Blood Updated: 03/07/24 1934     POC Glucose 105 mg/dl     RBC Morphology Reflex Test [[id number]] Collected: 03/07/24 1728    Lab Status: Final result Specimen: Blood from Arm, Left Updated: 03/07/24 1901    Comprehensive metabolic panel [[id number]]  (Abnormal) Collected: 03/07/24 1728    Lab Status: Final result Specimen: Blood from Arm, Left Updated: 03/07/24 1859     Sodium 134 mmol/L      Potassium 3.6 mmol/L      Chloride 100 mmol/L      CO2 26 mmol/L      ANION GAP 8 mmol/L      BUN 22 mg/dL      Creatinine 1.47 mg/dL      Glucose 32 mg/dL      Calcium 8.7 mg/dL      AST 12 U/L      ALT 17 U/L      Alkaline Phosphatase 70 U/L      Total Protein 6.7 g/dL      Albumin 4.0 g/dL      Total Bilirubin 0.66 mg/dL      eGFR 48 ml/min/1.73sq m     Narrative:      National Kidney Disease Foundation guidelines for Chronic Kidney Disease (CKD):     Stage 1 with normal or high GFR (GFR > 90 mL/min/1.73 square meters)    Stage 2 Mild CKD (GFR = 60-89  mL/min/1.73 square meters)    Stage 3A Moderate CKD (GFR = 45-59 mL/min/1.73 square meters)    Stage 3B Moderate CKD (GFR = 30-44 mL/min/1.73 square meters)    Stage 4 Severe CKD (GFR = 15-29 mL/min/1.73 square meters)    Stage 5 End Stage CKD (GFR <15 mL/min/1.73 square meters)  Note: GFR calculation is accurate only with a steady state creatinine    Fingerstick Glucose (POCT) [[id number]]  (Abnormal) Collected: 03/07/24 1857    Lab Status: Final result Specimen: Blood Updated: 03/07/24 1858     POC Glucose 63 mg/dl     FLU/RSV/COVID - if FLU/RSV clinically relevant [[id number]]  (Normal) Collected: 03/07/24 1728    Lab Status: Final result Specimen: Nares from Nose Updated: 03/07/24 1823     SARS-CoV-2 Negative     INFLUENZA A PCR Negative     INFLUENZA B PCR Negative     RSV PCR Negative    Narrative:      FOR PEDIATRIC PATIENTS - copy/paste COVID Guidelines URL to browser: [email or web address]    SARS-CoV-2 assay is a Nucleic Acid Amplification assay intended for the  qualitative detection of nucleic acid from SARS-CoV-2 in nasopharyngeal  swabs. Results are for the presumptive identification of SARS-CoV-2 RNA.    Positive results are indicative of infection with SARS-CoV-2, the virus  causing COVID-19, but do not rule out bacterial infection or co-infection  with other viruses. Laboratories within the [place] and its  territories are required to report all positive results to the appropriate  public health authorities. Negative results do not preclude SARS-CoV-2  infection and should not be used as the sole basis for treatment or other  patient management decisions. Negative results must be combined with  clinical observations, patient history, and epidemiological information.  This test has not been FDA cleared or approved.    This test has been authorized by FDA under an Emergency Use Authorization  (EUA). This test is only authorized for the duration  of time the  declaration that circumstances exist justifying the authorization of the  emergency use of an in vitro diagnostic tests for detection of SARS-CoV-2  virus and/or diagnosis of COVID-19 infection under section 564(b)(1) of  the Act, 21 U.S.C. 360bbb-3(b)(1), unless the authorization is terminated  or revoked sooner. The test has been validated but independent review by FDA  and CLIA is pending.    Test performed using [employer] GeneXpert: This RT-PCR assay targets N2,  a region unique to SARS-CoV-2. A conserved region in the E-gene was chosen  for pan-Sarbecovirus detection which includes SARS-CoV-2.    According to CMS-2020-01-R, this platform meets the definition of high-throughput technology.    Protime-INR [[id number]]  (Abnormal) Collected: 03/07/24 1728    Lab Status: Final result Specimen: Blood from Arm, Left Updated: 03/07/24 1822     Protime 15.5 seconds      INR 1.24    Procalcitonin [[id number]]  (Abnormal) Collected: 03/07/24 1728    Lab Status: Final result Specimen: Blood from Arm, Left Updated: 03/07/24 1812     Procalcitonin 1.08 ng/ml     Manual Differential(PHLEBS Do Not Order) [[id number]]  (Abnormal) Collected: 03/07/24 1728    Lab Status: Final result Specimen: Blood from Arm, Left Updated: 03/07/24 1810     Segmented % 75 %      Bands % 5 %      Lymphocytes % 5 %      Monocytes % 13 %      Eosinophils, % 2 %      Basophils % 0 %      Absolute Neutrophils 9.03 Thousand/uL      Lymphocytes Absolute 0.56 Thousand/uL      Monocytes Absolute 1.47 Thousand/uL      Eosinophils Absolute 0.23 Thousand/uL      Basophils Absolute 0.00 Thousand/uL      Total Counted --     RBC Morphology Present     Platelet Estimate Adequate     Anisocytosis Present    CBC and differential [[id number]]  (Abnormal) Collected: 03/07/24 1728    Lab Status: Final result Specimen: Blood from Arm, Left Updated: 03/07/24 1810     WBC 11.29 Thousand/uL      RBC 3.79 Million/uL      Hemoglobin 11.9 g/dL      Hematocrit 36.4  %      MCV 96 fL      MCH 31.4 pg      MCHC 32.7 g/dL      RDW 14.6 %      MPV 10.5 fL      Platelets 205 Thousands/uL     Narrative:      This is an appended report.  These results have been appended to a previously verified report.    HS Troponin 0hr (reflex protocol) [[id number]]  (Abnormal) Collected: 03/07/24 1728    Lab Status: Final result Specimen: Blood from Arm, Left Updated: 03/07/24 1809     hs TnI 0hr 67 ng/L     Lactic acid [[id number]]  (Normal) Collected: 03/07/24 1728    Lab Status: Final result Specimen: Blood from Arm, Left Updated: 03/07/24 1804     LACTIC ACID 1.7 mmol/L     Narrative:      Result may be elevated if tourniquet was used during collection.    Fingerstick Glucose (POCT) [[id number]]  (Abnormal) Collected: 03/07/24 1647    Lab Status: Final result Specimen: Blood Updated: 03/07/24 1648     POC Glucose 55 mg/dl                    CT head wo contrast   Final Result by Werner Harvey MD (03/07 2059)      No acute intracranial abnormality.                  Workstation performed: [id number]         XR chest 1 view portable   ED Interpretation by Karel Goins MD (03/07 1854)   No acute infiltrates especially looking in the right lower and left lower lobes.  No obvious intrathoracic pathology on this limited 1 view x-ray.      FL barium swallow video w speech    (Results Pending)         Procedures  Procedures    ECG interpreted by me.  Date: 3/7/2024.  Rate: 73 bpm.  Axis: Normal.  Rhythm: Regular.  This is sinus rhythm with frequent PACs.  There are some T wave abnormalities in the lateral leads.  There is a prolonged QT interval at 473 seconds.  This has been present on prior EKGs most notably from 12/19/2023.  Interpretation: This is an abnormal ECG but similar to prior without any obvious acute ischemic changes.    ED Course             HEART Risk Score      Flowsheet Row Most Recent Value   Heart Score Risk Calculator    History 0 Filed at: 03/07/2024 1648   ECG 1 Filed at:  03/07/2024 1648   Age 2 Filed at: 03/07/2024 1648   Risk Factors 2 Filed at: 03/07/2024 1648   Troponin 2 Filed at: 03/07/2024 1648   HEART Score 7 Filed at: 03/07/2024 1648                                  Medical Decision Making  DDx: Occult infection, virus, arrhythmia, anemia, acute metabolic disturbance, acute renal insufficiency, acute hepatic encephalopathy, arrhythmia, ACS, pleural effusions, pneumonia    Reassessment/disposition: Patient has had multiple episodes of hypoglycemia while here in the emergency department, is not on any agents other than his long-acting insulin that would make him prone to repeat episodes of hypoglycemia.  Provided multiple repletion's via IV dextrose.  Will admit for hypoglycemia, generalized weakness, and workup of his ongoing dysphagia.  I suspect that the patient's decreased urine output is due to decreased intake over the past 24 hours.  Will continue to provide IV fluid repletion through dextrose drip.  Patient was admitted in poor but stable condition with stable examination.    Amount and/or Complexity of Data Reviewed  Labs: ordered.  Radiology: ordered and independent interpretation performed.    Risk  Prescription drug management.  Decision regarding hospitalization.          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 Potts and the patient's admission status was agreed to be  Admission Status: inpatient status to the service of Dr. Potts .               Follow-up Information    None         Patient's Medications   Discharge Prescriptions    No medications on file     No discharge procedures on file.    PDMP Review         Value Time User    PDMP Reviewed  Yes 6/25/2023  9:46 PM Toñito Gillespie DO             ED Provider  Attending physically available and evaluated Karson You. I managed the patient along with the ED Attending.    Electronically Signed by           Karel Goins MD  03/07/24 5850

## 2024-03-08 NOTE — PLAN OF CARE
Problem: PHYSICAL THERAPY ADULT  Goal: Performs mobility at highest level of function for planned discharge setting.  See evaluation for individualized goals.  Description: Treatment/Interventions: Functional transfer training, LE strengthening/ROM, Elevations, Therapeutic exercise, Endurance training, Patient/family training, Equipment eval/education, Gait training, Bed mobility, Spoke to nursing          See flowsheet documentation for full assessment, interventions and recommendations.  Note: Prognosis: Good  Problem List: Decreased strength, Decreased endurance, Impaired balance, Decreased mobility, Decreased cognition, Impaired judgement, Decreased safety awareness  Assessment: Pt seen for high complexity PT evaluation due to decrease in functional mobility status compared to baseline.  Pt with active PT eval/treat orders at this time.  Pt is a 69 y.o. M who presented to [facility] with weakness, confusion, cough, difficulty swallowing, decreased urine output, fever, vomiting on 3/7/24.  Pt primary dx is acute metabolic encephalopathy.  Pt  has a past medical history of A-fib ([facility]), Adrenal insufficiency ([facility]), Atrial fibrillation ([facility]), Diabetes mellitus ([facility]), Obesity, morbid ([facility]) (11/14/2022), and Stroke ([facility]).  Pt resides with friend in half a house with 5STE.  Pt presents with decreased strength, balance, endurance that contribute to limitations in bed mobility, functional transfers, functional mobility.  Pt requires Min A for bed mobility, Mod A for functional transfers and short distance mobility at this time.  Pt left upright in bedside chair with chair alarm intact and with all needs in reach.  Pt will benefit from skilled therapy in order to address current impairments and functional limitations. PT to follow pt and recommending rehab once medically cleared.  The patient's AM-PAC Basic Mobility Inpatient Short Form Raw Score is 13. A Raw score of less than or equal to 16 suggests the  patient may benefit from discharge to post-acute rehabilitation services. Please also refer to the recommendation of the Physical Therapist for safe discharge planning.  Barriers to Discharge: Decreased caregiver support, Inaccessible home environment     Rehab Resource Intensity Level, PT: II (Moderate Resource Intensity)    See flowsheet documentation for full assessment.

## 2024-03-08 NOTE — ASSESSMENT & PLAN NOTE
Lab Results   Component Value Date    HGBA1C 8.8 (H) 11/11/2023     Recent Labs     03/08/24  0528 03/08/24  0627 03/08/24  0834 03/08/24  1040   POCGLU 175* 180* 158* 178*       Blood Sugar Average: Last 72 hrs:  (P) 128  Noted history of brittle diabetes and follow-up with [facility] endocrinology.  Significant hypoglycemia on admission with blood sugar of 35.  Likely secondary to poor oral intake.  Patient is maintained on D5 NS with improved blood sugars.  Endocrinology consulted and appreciate their input.

## 2024-03-08 NOTE — ASSESSMENT & PLAN NOTE
Continue Florinef 0.2 mg daily and Solu-Cortef.  On midodrine and salt tablets for orthostatic hypotension.   For my own notes - DO NOT relay to patient    Per rad onc      Yes palliative radiation would help with her symptoms.  Jordyn was adamant about not wanting chemotherapy, so without that her life expectancy - weeks, with radiation to brain mets - months. SHe would likely die before cognitive side effects of radiation would kick in.  The cognitive side effects of not radiating worse. This is where I am not sure Jordyn's voice was heard.  Is it possible to see Jordyn alone?     We could also have her meet with palliative care.  You know her much better than I so let me know if I can help.

## 2024-03-08 NOTE — PLAN OF CARE
Problem: OCCUPATIONAL THERAPY ADULT  Goal: Performs self-care activities at highest level of function for planned discharge setting.  See evaluation for individualized goals.  Description: Treatment Interventions: ADL retraining, Functional transfer training, Endurance training, Cognitive reorientation, Patient/family training, Equipment evaluation/education, Compensatory technique education, Continued evaluation, Energy conservation, Activityengagement          See flowsheet documentation for full assessment, interventions and recommendations.   Outcome: Progressing  Note: Limitation: Decreased ADL status, Decreased cognition, Decreased Safe judgement during ADL, Decreased endurance, Decreased self-care trans, Decreased high-level ADLs  Prognosis: Fair  Assessment: Pt is a 68 y/o male that was admitted to [facility] 3/8/2024 with acute metabolic encephalopathy. Pt  has a past medical history of A-fib (HCC), Adrenal insufficiency (HCC), Atrial fibrillation (HCC), Diabetes mellitus (HCC), Obesity, morbid (HCC), and Stroke (HCC). Pt is a questionable historian, pt reports he lives with a friend in a two level house with a walk in shower with a shower chair and grab bars and standard toilet. Pt reports using walker for mobility at baseline. Prior to admission pt (I) ADLs and functional mobility, pt requires assistance for IADLs. Pt currently requires MIN A for UB ADLs. Pt requires MOD A for LB ADLs, toileting, functional transfers, and functional mobility with rw. Pt limited by decreased ADL status, functional transfers, functional mobility, and activity tolerance. Pt supine in bed at begning of session, pt seated in bedside chair at end of session with alarm set and items within reach. The patient's raw score on the -PAC Daily Activity Inpatient Short Form is 17. A raw score of less than 19 suggests the patient may benefit from discharge to post-acute rehabilitation services. Please refer to the  recommendation of the Occupational Therapist for safe discharge planning. Recommend level II moderate intensity OT services at d/c to maximize pt function.     Rehab Resource Intensity Level, OT: II (Moderate Resource Intensity)

## 2024-03-08 NOTE — ED ATTENDING ATTESTATION
3/7/2024  I, Trevon Gibson MD, saw and evaluated the patient. I have discussed the patient with the resident/non-physician practitioner and agree with the resident's/non-physician practitioner's findings, Plan of Care, and MDM as documented in the resident's/non-physician practitioner's note, except where noted. All available labs and Radiology studies were reviewed.  I was present for key portions of any procedure(s) performed by the resident/non-physician practitioner and I was immediately available to provide assistance.       At this point I agree with the current assessment done in the Emergency Department.  I have conducted an independent evaluation of this patient a history and physical is as follows:    ED Course     69-year-old male with history of CVA, diabetes presenting with acute change in mental status as well as low blood sugars of vomiting and diarrhea.  Associated symptoms include generalized weakness.    Upon arrival patient was acutely altered unable to offer any history denies any history of trauma.  Daughter is available and provide any history.    Vitals reviewed.  Accu-Chek reviewed patient given amp D50 for low blood sugar.  Upper airway congestion noises clear lungs otherwise.  Delayed capillary refill to 3 seconds pale appearance.  Neuro exam limited secondary to patient cooperation.    Impression: Acute encephalopathy, cough, low blood sugar  Differential diagnosis: Cold infection virus and arrhythmia anemia metabolic disturbance acute renal insufficiency back encephalopathy ACS at risk for pneumonia.    ECG independently interpreted by me sinus rhythm with PACs T wave abnormalities lateral leads.  Prolonged QT impression abnormal ECG.    Chest x-ray independently interpreted by me no acute cardiopulmonary disease.    CT head reviewed report no acute intracranial abnormality.    Labs reviewed Accu-Chek 55 and troponin 67 lactic acid 1.7 CBC remarkable for mild leukocytosis and  anemia.  Elevated procalcitonin level elevated INR.CMP remarkable for hyperglycemia hyponatremia.  Elevated AST troponin 54.    Patient given additional amp of D50 and started on dextrose containing maintenance fluids.  Plan to admit patient for symptomatic hypoglycemia and acute encephalopathy.      Critical Care Time  CriticalCare Time    Date/Time: 3/7/2024 7:00 PM    Performed by: Trevon Gibson MD  Authorized by: Trevon Gibson MD    Critical care provider statement:     Critical care time (minutes):  32    Critical care time was exclusive of:  Separately billable procedures and treating other patients and teaching time    Critical care was necessary to treat or prevent imminent or life-threatening deterioration of the following conditions:  Metabolic crisis    Critical care was time spent personally by me on the following activities:  Obtaining history from patient or surrogate, development of treatment plan with patient or surrogate, evaluation of patient's response to treatment, examination of patient, ordering and performing treatments and interventions, ordering and review of laboratory studies, ordering and review of radiographic studies and re-evaluation of patient's condition    I assumed direction of critical care for this patient from another provider in my specialty: no

## 2024-03-08 NOTE — OCCUPATIONAL THERAPY NOTE
Occupational Therapy Evaluation     Patient Name: Karson You  Today's Date: 3/8/2024  Problem List  Principal Problem:    Acute metabolic encephalopathy  Active Problems:    Type 1 diabetes mellitus with hypoglycemia (HCC)    Adrenal insufficiency (HCC)    Orthostatic hypotension    History of CVA (cerebrovascular accident)    Paroxysmal atrial fibrillation (HCC)    Hypomagnesemia    Elevated troponin level not due myocardial infarction    Elevated serum creatinine    Past Medical History  Past Medical History:   Diagnosis Date    A-fib (HCC)     Adrenal insufficiency (HCC)     Atrial fibrillation (HCC)     Diabetes mellitus (HCC)     Obesity, morbid (HCC) 11/14/2022    Stroke (HCC)      Past Surgical History  History reviewed. No pertinent surgical history.      03/08/24 1036   OT Last Visit   OT Visit Date 03/08/24   Note Type   Note type Evaluation   Pain Assessment   Pain Assessment Tool 0-10   Pain Score No Pain   Restrictions/Precautions   Weight Bearing Precautions Per Order No   Other Precautions Cognitive;Chair Alarm;Bed Alarm;Telemetry;Fall Risk;Multiple lines;Contact/isolation   Home Living   Type of Home House  (1/2 a house)   Home Layout Two level  (5 RYAN)   Bathroom Shower/Tub Walk-in shower   Bathroom Toilet Standard   Bathroom Equipment Shower chair;Grab bars in shower   Home Equipment Walker   Additional Comments Pt questionable historian. Pt reports he mostly lives on first floor except when his daughter helps up upstairs to bathe   Prior Function   Level of [place] Needs assistance with IADLS;Independent with functional mobility;Independent with ADLs   Lives With Friend(s)   Receives Help From Family   IADLs Family/Friend/Other provides transportation;Family/Friend/Other provides meals   Falls in the last 6 months 0   Vocational Retired   Comments Pt questionable historian   Lifestyle   Autonomy Pt reports (I) with ADLs and functional mobility with rw. pt requires assistance with IADLs    Reciprocal Relationships Family   Service to Others retired   ADL   Where Assessed Edge of bed   Eating Assistance 5  Supervision/Setup   Grooming Assistance 5  Supervision/Setup   UB Bathing Assistance 4  Minimal Assistance   LB Bathing Assistance 3  Moderate Assistance   UB Dressing Assistance 4  Minimal Assistance   LB Dressing Assistance 3  Moderate Assistance   Toileting Assistance  3  Moderate Assistance   Functional Assistance 3  Moderate Assistance   Bed Mobility   Supine to Sit 4  Minimal assistance   Additional items Assist x 1;HOB elevated;Increased time required;LE management;Verbal cues   Transfers   Sit to Stand 3  Moderate assistance   Additional items Assist x 1;Increased time required;Verbal cues   Stand to Sit 3  Moderate assistance   Additional items Assist x 1;Increased time required;Verbal cues   Additional Comments with rw   Functional Mobility   Functional Mobility 3  Moderate assistance   Additional Comments Pt requires MOD Ax1 to take steps to bedside chair with rw   Additional items Rolling walker   Balance   Static Sitting Fair   Dynamic Sitting Fair   Static Standing Fair -   Dynamic Standing Poor +   Ambulatory Poor   Activity Tolerance   Activity Tolerance Patient limited by fatigue   Medical Staff Made Aware PT Maranda   Nurse Made Aware Yes   RUE Assessment   RUE Assessment WFL   LUE Assessment   LUE Assessment WFL   Hand Function   Gross Motor Coordination Functional   Fine Motor Coordination Functional   Cognition   Overall Cognitive Status Impaired   Arousal/Participation Alert;Responsive;Cooperative   Attention Attends with cues to redirect   Orientation Level Oriented to person;Oriented to place;Oriented to time;Disoriented to situation   Memory Decreased recall of recent events;Decreased recall of precautions   Following Commands Follows one step commands with increased time or repetition   Comments Pt agreeable to therapy. Pt questionable historian, with decreased safety  awareness and insight   Assessment   Limitation Decreased ADL status;Decreased cognition;Decreased Safe judgement during ADL;Decreased endurance;Decreased self-care trans;Decreased high-level ADLs   Prognosis Fair   Assessment Pt is a 70 y/o male that was admitted to [facility] 3/8/2024 with acute metabolic encephalopathy. Pt  has a past medical history of A-fib (HCC), Adrenal insufficiency (HCC), Atrial fibrillation (HCC), Diabetes mellitus (HCC), Obesity, morbid (HCC), and Stroke (HCC). Pt is a questionable historian, pt reports he lives with a friend in a two level house with a walk in shower with a shower chair and grab bars and standard toilet. Pt reports using walker for mobility at baseline. Prior to admission pt (I) ADLs and functional mobility, pt requires assistance for IADLs. Pt currently requires MIN A for UB ADLs. Pt requires MOD A for LB ADLs, toileting, functional transfers, and functional mobility with rw. Pt limited by decreased ADL status, functional transfers, functional mobility, and activity tolerance. Pt supine in bed at begning of session, pt seated in bedside chair at end of session with alarm set and items within reach. The patient's raw score on the AM-PAC Daily Activity Inpatient Short Form is 17. A raw score of less than 19 suggests the patient may benefit from discharge to post-acute rehabilitation services. Please refer to the recommendation of the Occupational Therapist for safe discharge planning. Recommend level II moderate intensity OT services at d/c to maximize pt function.   Goals   Patient Goals To get OOB   LTG Time Frame 10-14   Plan   Treatment Interventions ADL retraining;Functional transfer training;Endurance training;Cognitive reorientation;Patient/family training;Equipment evaluation/education;Compensatory technique education;Continued evaluation;Energy conservation;Activityengagement   Goal Expiration Date 03/22/24   OT Frequency 2-3x/wk   Discharge  Recommendation   Rehab Resource Intensity Level, OT II (Moderate Resource Intensity)   AM-PAC Daily Activity Inpatient   Lower Body Dressing 2   Bathing 2   Toileting 2   Upper Body Dressing 3   Grooming 4   Eating 4   Daily Activity Raw Score 17   Daily Activity Standardized Score (Calc for Raw Score >=11) 37.26   AM-PAC Applied Cognition Inpatient   Following a Speech/Presentation 2   Understanding Ordinary Conversation 3   Taking Medications 2   Remembering Where Things Are Placed or Put Away 3   Remembering List of 4-5 Errands 2   Taking Care of Complicated Tasks 2   Applied Cognition Raw Score 14   Applied Cognition Standardized Score 32.02   End of Consult   Education Provided Yes   Patient Position at End of Consult Bedside chair;Bed/Chair alarm activated;All needs within reach     Goals:    Pt will complete functional transfers with MOD IND and appropriate AD to maximize pt safety.    Pt will complete bed mobility with MOD IND  to maximize pt safety.    Pt will complete LB bathing with MOD IND  to maximize pt independence.    Pt will complete UB bathing with MOD IND to maximize pt independence.    Pt will complete toileting with MOD IND to maximize pt independence.    Pt will complete functional household distance mobility with MOD IND and appropriate AD to maximize pt safety    Pt will follow multistep directions with increased time or repeating directions 2X to maximize pt safety.       ALLI Fleming, OTR/L

## 2024-03-08 NOTE — ASSESSMENT & PLAN NOTE
Baseline 0.9-1.2.  Creatinine on admission 1.47.    Reported minimal urine output today by daughter  Monitor with IV fluids  Bladder scan with 200 cc.  Placed on retention protocol.  Monitor SABINA's

## 2024-03-08 NOTE — PLAN OF CARE
Problem: Nutrition/Hydration-ADULT  Goal: Nutrient/Hydration intake appropriate for improving, restoring or maintaining nutritional needs  Description: Monitor and assess patient's nutrition/hydration status for malnutrition. Collaborate with interdisciplinary team and initiate plan and interventions as ordered.  Monitor patient's weight and dietary intake as ordered or per policy. Utilize nutrition screening tool and intervene as necessary. Determine patient's food preferences and provide high-protein, high-caloric foods as appropriate.     INTERVENTIONS:  - Monitor oral intake, urinary output, labs, and treatment plans  - Assess nutrition and hydration status and recommend course of action  - Evaluate amount of meals eaten  - Assist patient with eating if necessary   - Allow adequate time for meals  - Recommend/ encourage appropriate diets, oral nutritional supplements, and vitamin/mineral supplements  - Order, calculate, and assess calorie counts as needed  - Recommend, monitor, and adjust tube feedings and TPN/PPN based on assessed needs  - Assess need for intravenous fluids  - Provide specific nutrition/hydration education as appropriate  - Include patient/family/caregiver in decisions related to nutrition  Outcome: Progressing     Problem: PAIN - ADULT  Goal: Verbalizes/displays adequate comfort level or baseline comfort level  Description: Interventions:  - Encourage patient to monitor pain and request assistance  - Assess pain using appropriate pain scale  - Administer analgesics based on type and severity of pain and evaluate response  - Implement non-pharmacological measures as appropriate and evaluate response  - Consider cultural and social influences on pain and pain management  - Notify physician/advanced practitioner if interventions unsuccessful or patient reports new pain  Outcome: Progressing     Problem: INFECTION - ADULT  Goal: Absence or prevention of progression during  hospitalization  Description: INTERVENTIONS:  - Assess and monitor for signs and symptoms of infection  - Monitor lab/diagnostic results  - Monitor all insertion sites, i.e. indwelling lines, tubes, and drains  - Monitor endotracheal if appropriate and nasal secretions for changes in amount and color  - [place] appropriate cooling/warming therapies per order  - Administer medications as ordered  - Instruct and encourage patient and family to use good hand hygiene technique  - Identify and instruct in appropriate isolation precautions for identified infection/condition  Outcome: Progressing     Problem: SAFETY ADULT  Goal: Patient will remain free of falls  Description: INTERVENTIONS:  - Educate patient/family on patient safety including physical limitations  - Instruct patient to call for assistance with activity   - Consult OT/PT to assist with strengthening/mobility   - Keep Call bell within reach  - Keep bed low and locked with side rails adjusted as appropriate  - Keep care items and personal belongings within reach  - Initiate and maintain comfort rounds  - Make Fall Risk Sign visible to staff  - Offer Toileting every  Hours, in advance of need  - Initiate/Maintain alarm  - Obtain necessary fall risk management equipment:   - Apply yellow socks and bracelet for high fall risk patients  - Consider moving patient to room near nurses station  Outcome: Progressing  Goal: Maintain or return to baseline ADL function  Description: INTERVENTIONS:  -  Assess patient's ability to carry out ADLs; assess patient's baseline for ADL function and identify physical deficits which impact ability to perform ADLs (bathing, care of mouth/teeth, toileting, grooming, dressing, etc.)  - Assess/evaluate cause of self-care deficits   - Assess range of motion  - Assess patient's mobility; develop plan if impaired  - Assess patient's need for assistive devices and provide as appropriate  - Encourage maximum independence but intervene  and supervise when necessary  - Involve family in performance of ADLs  - Assess for home care needs following discharge   - Consider OT consult to assist with ADL evaluation and planning for discharge  - Provide patient education as appropriate  Outcome: Progressing  Goal: Maintains/Returns to pre admission functional level  Description: INTERVENTIONS:  - Perform AM-PAC 6 Click Basic Mobility/ Daily Activity assessment daily.  - Set and communicate daily mobility goal to care team and patient/family/caregiver.   - Collaborate with rehabilitation services on mobility goals if consulted  - Perform Range of Motion  times a day.  - Reposition patient every  hours.  - Dangle patient  times a day  - Stand patient  times a day  - Ambulate patient  times a day  - Out of bed to chair  times a day   - Out of bed for meals times a day  - Out of bed for toileting  - Record patient progress and toleration of activity level   Outcome: Progressing     Problem: DISCHARGE PLANNING  Goal: Discharge to home or other facility with appropriate resources  Description: INTERVENTIONS:  - Identify barriers to discharge w/patient and caregiver  - Arrange for needed discharge resources and transportation as appropriate  - Identify discharge learning needs (meds, wound care, etc.)  - Arrange for interpretive services to assist at discharge as needed  - Refer to Case Management Department for coordinating discharge planning if the patient needs post-hospital services based on physician/advanced practitioner order or complex needs related to functional status, cognitive ability, or social support system  Outcome: Progressing     Problem: Knowledge Deficit  Goal: Patient/family/caregiver demonstrates understanding of disease process, treatment plan, medications, and discharge instructions  Description: Complete learning assessment and assess knowledge base.  Interventions:  - Provide teaching at level of understanding  - Provide teaching via  preferred learning methods  Outcome: Progressing

## 2024-03-08 NOTE — ASSESSMENT & PLAN NOTE
EKG with nonspecific T-wave changes.  Asymptomatic.  Suspect non-ischemic troponin elevation secondary to metabolic derangements.

## 2024-03-08 NOTE — ASSESSMENT & PLAN NOTE
Presented with 1 day of confusion, generalized weakness, poor oral intake, cough and difficulty swallowing concerning for aspiration.  Also with reported fever and 1 episode of vomiting and diarrhea.  Noted to be significant hypoglycemic (blood sugar of 35) with slight elevation in creatinine.  All these are likely contributing to his metabolic encephalopathy.  Started on D5 normal saline and hyperglycemics held.  CT head: No acute findings.  Given concern for aspiration, CXR obtained without evidence of infectious process despite mild leukocytosis on admission and reports of fever prior to admission.    Leukocytosis has resolved.  Patient is afebrile.  Patient failed bedside speech eval.  Maintain NPO status for now.  Speech therapy consulted and patient will undergo VBS.  No indication for antibiotics at this time.  Consider aspiration pneumonitis.

## 2024-03-08 NOTE — H&P
[facility]  H&P  Name: Karson You 69 y.o. male I MRN: [id number]  Unit/Bed#: QCB I Date of Admission: 3/7/2024   Date of Service: 3/7/2024 I Hospital Day: 0      Assessment/Plan   * Acute metabolic encephalopathy  Assessment & Plan  Presented with 1 day of confusion, generalized weakness, poor oral intake, cough and difficulty swallowing concerning for aspiration.  Also with reported fever and 1 episode of vomiting and diarrhea.  Noted to be significant hypoglycemic with slight elevation in creatinine.  All these are likely contributing to his metabolic encephalopathy.  Patient is alert but slightly confused and very slow in responding to questions.  No focal deficits however did appreciate slight slurred speech.  He is oriented to place and time needed some time to think about it.  He referred to his daughter as his friend then corrected himself.  He is now started on D5 normal saline.  Monitor glucose level every hour for now.  Obtain CT head to rule out any acute intracranial process.  He is currently afebrile, however given reported fever of 102 at home.  Will obtain infectious workup with UA,  blood cultures and stool studies.  Chest x-ray reviewed and I could not appreciate any consolidation.  Will follow-up formal read.  COVID-19/flu are negative.  Noted slightly elevated procalcitonin and mild leukocytosis.  Monitor closely off antibiotics for now awaiting workup.  Given concerns for aspiration.  Will place patient is n.p.o. until obtaining bedside swallowing test.  May need VBS.  Aspiration precautions  Monitor CBC and BMP  Neurochecks    Type 1 diabetes mellitus with hypoglycemia (HCC)  Assessment & Plan  Lab Results   Component Value Date    HGBA1C 8.8 (H) 11/11/2023       Recent Labs     03/07/24  1647 03/07/24  1857 03/07/24  1932 03/07/24 2043   POCGLU 55* 63* 105 95       Blood Sugar Average: Last 72 hrs:  (P) 79.5  Noted history of brittle diabetes and  Progress Note    Assessment/Plan  Continue present care.  Urinary leak is less.  Right hip wound is stable with drain in place.  Edema essentially unchanged.  Reviewed again with patient the potential need for debridement of this with placement of VAC dressing before discharge from the care facility.  Reviewed with other caregivers as well.  We will repeat CAT scan in 48 hours to determine potential benefit of surgical invention.    Active Problems:    Septic shock (H)      Subjective  Quite comfortable today and eating better.  Less drainage from the abdominal site which is and a significant improvement for her.  Without significant pain.  Objective    Vital signs in last 24 hours  Temp:  [97.7  F (36.5  C)-98.6  F (37  C)] 98.6  F (37  C)  Pulse:  [85-94] 92  Resp:  [16-18] 16  BP: ()/(52-58) 105/52  SpO2:  [96 %-100 %] 96 %  Weight:   [unfilled]    Intake/Output last 3 shifts  I/O last 3 completed shifts:  In: 700 [P.O.:600; I.V.:100]  Out: 1795 [Urine:1545; Drains:250]  Intake/Output this shift:  I/O this shift:  In: -   Out: 280 [Urine:200; Drains:80]      Physical Exam  Abdomen benign bowel sounds present no peritoneal findings.  The urinary drain is intact and dry at the current time.  Right hip drain is stable.  Output is more serous and much decreased from original purulent process.  Otherwise presacral area right lower extremity hip and buttock without change.    Pertinent Labs   Lab Results   Component Value Date    WBC 3.2 (L) 09/19/2022    HGB 7.8 (L) 09/19/2022    HCT 25.5 (L) 09/19/2022    MCV 93 09/19/2022    PLT 97 (L) 09/19/2022             Pertinent Radiology     [unfilled]        Reji Hunter MD          follow-up with [facility] endocrinology.  Blood glucose noted to be 35 on admission.  Likely secondary to poor oral intake today.  Daughter reported that he got his basal insulin this morning.  Monitor glucose every 1 hour with D5 normal saline.  Consult endocrinology  Hold off insulin for now.  Monitor closely for DKA    Elevated serum creatinine  Assessment & Plan  Baseline 0.9-1.2.  Creatinine on admission 1.47.    Reported minimal urine output today by daughter  Monitor with IV fluids  Bladder scan with 200 cc.  Placed on retention protocol.  Monitor SABINA's    Elevated troponin  Assessment & Plan  EKG with nonspecific T changes.  Denies chest pain  Nonischemic elevation.  Monitor for symptoms    Paroxysmal atrial fibrillation (HCC)  Assessment & Plan  Maintained on Eliquis.  On beta-blockers    History of CVA (cerebrovascular accident)  Assessment & Plan  Continue aspirin, Eliquis and atorvastatin.  Obtain CT head    Orthostatic hypotension  Assessment & Plan  Continue midodrine and salt tablets.    Adrenal insufficiency (HCC)  Assessment & Plan  Continue Florinef 0.2 mg daily and Cortef 20 mg in a.m. and 50 mg in p.m.  On midodrine and salt tablets for orthostatic hypotension           VTE Pharmacologic Prophylaxis:   Moderate Risk (Score 3-4) - Pharmacological DVT Prophylaxis Ordered: apixaban (Eliquis).  Code Status: Level 1 - Full Code   Discussion with family: Updated  (daughter) at bedside.    Anticipated Length of Stay: Patient will be admitted on an inpatient basis with an anticipated length of stay of greater than 2 midnights secondary to encephalopathy, hypoglycemia.    Total Time Spent on Date of Encounter in care of patient: 77 mins. This time was spent on one or more of the following: performing physical exam; counseling and coordination of care; obtaining or reviewing history; documenting in the medical record; reviewing/ordering tests, medications or procedures; communicating with  other healthcare professionals and discussing with patient's family/caregivers.    Chief Complaint: Generalized weakness    History of Present Illness:  Karson You is a 69 y.o. male with a PMH of type I diabetes/brittle diabetes, PAF, CVA, adrenal insufficiency, orthostatic hypotension, chronic anemia, hyperlipidemia and CAD who brought by his daughter due to generalized weakness, confusion, coughing, swallowing difficulty, minimal urine output ,reported fever of 102 at home and 1 episode of vomiting and loose stool.  All symptoms started today.  Per daughter, he was in good health yesterday.  On presentation, he was noted to be afebrile with stable vital signs.  Laboratory workup is notable for significant hypoglycemia of 35, mild leukocytosis and slightly elevated creatinine.  He was given D5W and admitted to [facility] service.    Review of Systems:  Review of Systems negative apart from HPI    Past Medical and Surgical History:   Past Medical History:   Diagnosis Date    A-fib (HCC)     Adrenal insufficiency (HCC)     Atrial fibrillation (HCC)     Diabetes mellitus (HCC)     Obesity, morbid (HCC) 11/14/2022    Stroke (HCC)        History reviewed. No pertinent surgical history.    Meds/Allergies:  Prior to Admission medications    Medication Sig Start Date End Date Taking? Authorizing Provider   acetaminophen (TYLENOL) 325 mg tablet Take 3 tablets (975 mg total) by mouth every 8 (eight) hours 12/21/23   Carmenza Leblanc PA-C   albuterol (2.5 mg/3 mL) 0.083 % nebulizer solution Take 3 mL (2.5 mg total) by nebulization every 6 (six) hours as needed for wheezing or shortness of breath 12/21/23   Carmenza Leblanc PA-C   albuterol (PROVENTIL HFA,VENTOLIN HFA) 90 mcg/act inhaler Inhale 2 puffs every 4 (four) hours as needed for wheezing 12/21/23   Carmenza Leblanc PA-C   apixaban (Eliquis) 5 mg Take 1 tablet (5 mg total) by mouth 2 (two) times a day 3/6/24   Júnior Reza MD   aspirin 81 mg chewable tablet Chew 81 mg daily     Historical Provider, MD   atorvastatin (LIPITOR) 10 mg tablet Take 1 tablet by mouth daily 7/12/22   Historical Provider, MD   calcitriol (ROCALTROL) 0.25 mcg capsule Take 1 capsule (0.25 mcg total) by mouth daily Do not start before November 15, 2023. 11/15/23   Lizet Herron MD   Continuous Blood Gluc  (Dexcom G7 ) CYN Use 1 each continuous 10/23/23   Lewis Butts MD   Continuous Blood Gluc Sensor (Dexcom G7 Sensor) Use 1 Device every 10 days 3/5/24   Lewis Butts MD   cyanocobalamin (VITAMIN B-12) 100 MCG tablet Take 1 tablet (100 mcg total) by mouth daily Do not start before November 15, 2023. 11/15/23   Lizet Herron MD   ferrous sulfate 324 (65 Fe) mg TAKE 1 TABLET (324 MG TOTAL) BY MOUTH DAILY BEFORE BREAKFAST 6/26/23   Lloyd Mosqueda MD   fludrocortisone (FLORINEF) 0.1 mg tablet Take 2 tablets (0.2 mg total) by mouth daily 12/1/23   Lewis Butts MD   hydrocortisone (CORTEF) 5 mg tablet Use 4tabs (20mg) in morning and 3 tabs (15mg) daily afternoon Do not start before December 22, 2023. 12/22/23   Carmenza Leblanc PA-C   insulin glargine (Toujeo Max SoloStar) 300 units/mL CONCENTRATED U-300 injection pen (2-unit dial) Inject 18 Units under the skin daily before breakfast 3/6/24   Lewis Butts MD   insulin lispro (HumALOG/ADMELOG) 100 units/mL injection Use 9u before each meal plus 1u/60 above 150mg/dL; max dose 40u/day 3/6/24   Lewis Butts MD   Insulin Pen Needle (BD Pen Needle Josie 2nd Gen) 32G X 4 MM MISC FOR USE WITH INSULIN PEN. PHARMACY MAY DISPENSE BRAND COVERED BY INSURANCE. 12/1/23   Lewis Butts MD   Insulin Pen Needle (BD Pen Needle Josie 2nd Gen) 32G X 4 MM MISC For use with insulin pen 4 times daily. Pharmacy may dispense brand covered by insurance. 3/6/24   Lewis Butts MD   magnesium Oxide (MAG-OX) 400 mg TABS Take 1 tablet (400 mg total) by mouth 2 (two) times a day 12/7/23   DO solomon Louis  (PROAMATINE) 10 MG tablet Take 1.5 tablets (15 mg total) by mouth 3 (three) times a day before meals  Patient taking differently: Take 15 mg by mouth 3 (three) times a day before meals Hold for SBP under 140 or DBP under 90  Per Dr. Mckayla DAVILA from [facility] 1/5/24. List uploaded into [employer]. 10/27/23 10/21/24  Júnior Reza MD   potassium chloride (Klor-Con) 10 mEq tablet Take 40 mEq by mouth 2 (two) times a day Per Dr. Mckayla DAVILA from [facility] 1/5/24. List uploaded into [employer].  Do not start before January 3, 2024. 1/3/24   Historical Provider, MD   risperiDONE (RisperDAL) 1 mg tablet Take 1 tablet by mouth 2 (two) times a day    Historical Provider, MD   sertraline (ZOLOFT) 100 mg tablet Take 100 mg by mouth daily 3/16/22 1/30/24  Historical Provider, MD   sodium chloride 1 g tablet Take 1 tablet (1 g total) by mouth 3 (three) times a day 11/14/23   MD Nataliya Tavera 300 units/mL CONCENTRATED U-300 injection pen (1-unit dial) Inject 20 Units under the skin daily at bedtime  Patient taking differently: Inject 18 Units under the skin daily at bedtime Per Dr. Mckayla DAVILA from [facility] 1/5/24. List uploaded into [employer]. 1/9/24   Lewis Butts MD     I have reviewed home medications with patient family member.    Allergies:   Allergies   Allergen Reactions    Imipramine Other (See Comments)    Lisinopril Other (See Comments)    Paroxetine Other (See Comments)    Penicillins Hives    Rosiglitazone Other (See Comments)    Troglitazone Other (See Comments)       Social History:  Marital Status: Single   Occupation:   Patient Pre-hospital Living Situation: Home  Patient Pre-hospital Level of Mobility: walks with walker  Patient Pre-hospital Diet Restrictions:   Substance Use History:   Social History     Substance and Sexual Activity   Alcohol Use Not Currently    Alcohol/week: 5.0 standard drinks of alcohol    Types: 5 Cans of beer per week    Comment: Quit  in august 2022     Social History     Tobacco Use   Smoking Status Former    Current packs/day: 0.25    Average packs/day: 0.3 packs/day for 30.0 years (7.5 ttl pk-yrs)    Types: Cigarettes   Smokeless Tobacco Never     Social History     Substance and Sexual Activity   Drug Use Never       Family History:  Family History   Problem Relation Age of Onset    Heart disease Mother     Cancer Father     Multiple sclerosis Sister        Physical Exam:     Vitals:   Blood Pressure: 137/65 (03/07/24 2130)  Pulse: 73 (03/07/24 2130)  Temperature: 98.5 °F (36.9 °C) (03/07/24 1646)  Temp Source: Tympanic (03/07/24 1646)  Respirations: 19 (03/07/24 2130)  SpO2: 99 % (03/07/24 2130)    Physical Exam  Vitals and nursing note reviewed.   Constitutional:       General: He is not in acute distress.     Appearance: He is well-developed. He is ill-appearing.   HENT:      Head: Normocephalic and atraumatic.   Eyes:      Conjunctiva/sclera: Conjunctivae normal.   Cardiovascular:      Rate and Rhythm: Normal rate and regular rhythm.      Heart sounds: No murmur heard.  Pulmonary:      Effort: Pulmonary effort is normal. No respiratory distress.      Breath sounds: Normal breath sounds.   Abdominal:      Palpations: Abdomen is soft.      Tenderness: There is no abdominal tenderness.   Musculoskeletal:         General: No swelling.      Cervical back: Neck supple.   Skin:     General: Skin is warm and dry.      Capillary Refill: Capillary refill takes less than 2 seconds.      Findings: No rash.   Neurological:      Mental Status: He is alert.      Cranial Nerves: No cranial nerve deficit.      Motor: No weakness.      Comments: Slightly confused.  Oriented to time place.  Slow in responding to questions.  Noted slight slurred speech.   Psychiatric:         Mood and Affect: Mood normal.          Additional Data:     Lab Results:  Results from last 7 days   Lab Units 03/07/24  1728   WBC Thousand/uL 11.29*   HEMOGLOBIN g/dL 11.9*    HEMATOCRIT % 36.4*   PLATELETS Thousands/uL 205   BANDS PCT % 5   LYMPHO PCT % 5*   MONO PCT % 13*   EOS PCT % 2     Results from last 7 days   Lab Units 03/07/24  1728   SODIUM mmol/L 134*   POTASSIUM mmol/L 3.6   CHLORIDE mmol/L 100   CO2 mmol/L 26   BUN mg/dL 22   CREATININE mg/dL 1.47*   ANION GAP mmol/L 8   CALCIUM mg/dL 8.7   ALBUMIN g/dL 4.0   TOTAL BILIRUBIN mg/dL 0.66   ALK PHOS U/L 70   ALT U/L 17   AST U/L 12*   GLUCOSE RANDOM mg/dL 32*     Results from last 7 days   Lab Units 03/07/24  1728   INR  1.24*     Results from last 7 days   Lab Units 03/07/24  2043 03/07/24  1932 03/07/24  1857 03/07/24  1647   POC GLUCOSE mg/dl 95 105 63* 55*         Results from last 7 days   Lab Units 03/07/24  1728   LACTIC ACID mmol/L 1.7   PROCALCITONIN ng/ml 1.08*       Lines/Drains:  Invasive Devices       Peripheral Intravenous Line  Duration             Peripheral IV 03/07/24 Distal;Left;Upper;Ventral (anterior) Arm <1 day              Drain  Duration             Urethral Catheter Coude 16 Fr. <1 day                        Imaging: Reviewed radiology reports from this admission including: chest xray  CT head wo contrast   Final Result by Werner Harvey MD (03/07 2059)      No acute intracranial abnormality.                  Workstation performed: [id number]         XR chest 1 view portable   ED Interpretation by Karel Goins MD (03/07 1854)   No acute infiltrates especially looking in the right lower and left lower lobes.  No obvious intrathoracic pathology on this limited 1 view x-ray.          EKG and Other Studies Reviewed on Admission:   EKG:  reviewed .    ** Please Note: This note has been constructed using a voice recognition system. **

## 2024-03-08 NOTE — ASSESSMENT & PLAN NOTE
Baseline creatinine:  0.9-1.2.    Creatinine on admission 1.47.    Creatinine today: 0.99  Patient failed urinary retention protocol so has beebe catheter in place.  Will need voiding trial prior to discharge.

## 2024-03-08 NOTE — CONSULTS
Endocrinology Consultation  Karson You 69 y.o. male MRN: [id number]  Unit/Bed#: [facility] 512-01 Encounter: [id number]      Assessment/Plan     Assessment:  This is a 69 y.o.-year-old male with longstanding type 1 diabetes and longstanding secondary adrenal insufficiency, autonomic dysfunction, orthostatic hypotension, admitted with confusion and hypoglycemia, concern for aspiration.  Was n.p.o., now cleared for diet.    Plan:  --Gave patient 10 units of Lantus this morning, continue reduced basal dosing.  Please do not hold this without contacting endocrinology patient is a type I diabetic and does not make any of his own insulin.  --Start Algorithm 2 correction scale during the day  -- check bedtime blood glucose  --Recommend stopping the d5 infusion after dinner  --Will double patient's hydrocortisone dosing for this admission, to 40 mg a.m., 30 mg p.m. continue fludrocortisone 0.2 mg daily.  --Hypoglycemia protocol  --Will continue to follow and make adjustments as appropriate.  Anticipate increasing regimen again as patient's oral intake improves    CC: Diabetes Consult    History of Present Illness     HPI: Karson You is a 69 y.o. year old male with type 1 diabetes for 30 years, diagnosed age 39 on basal bolus therapy, with complication of CVA, CAD. Recent A1c 8.8%. Also with history of secondary adrenal insufficiency, orthostatic hypotension, HTN, HLD, A-fib, anxiety depression, CVA deficits including dysphagia.    Patient was brought in to ED by family yesterday for worsening confusion, generalized weakness, dysphagia, poor oral intake, concern for aspiration.  Reported fever and 1 episode of vomiting and diarrhea.  Patient was hypoglycemic in ED to 32.  He received several doses of D50 before being placed on D5 NS which he remains on.  Patient was made n.p.o. due to concern for aspiration and cleared for diet this afternoon.  First meal will be dinner.  Patient poor historian, does not recall much of  above.  At home, family or nurses administer his insulin.    Patient follows with Dr. Butts, last seen in the office on 1/30.  At home tends towards hyperglycemia.    For his diabetes, patient is on Toujeo U 300 18 units nightly, lispro 9 units with meals, plus a 1 to 5 unit correction scale.  For his adrenal insufficiency, he is on hydrocortisone 20 in the morning, 15 in the afternoon around 2 PM and fludrocortisone 0.2 mg once daily    Inpatient consult to Endocrinology  Consult performed by: Carmenza Melendez DO  Consult ordered by: Westley Gonzalez MD          Review of Systems   Unable to perform ROS: Dementia       Historical Information   Past Medical History:   Diagnosis Date    A-fib (HCC)     Adrenal insufficiency (HCC)     Atrial fibrillation (HCC)     Diabetes mellitus (HCC)     Obesity, morbid (HCC) 11/14/2022    Stroke (HCC)      History reviewed. No pertinent surgical history.  Social History   Social History     Substance and Sexual Activity   Alcohol Use Not Currently    Alcohol/week: 5.0 standard drinks of alcohol    Types: 5 Cans of beer per week    Comment: Quit in august 2022     Social History     Substance and Sexual Activity   Drug Use Never     Social History     Tobacco Use   Smoking Status Former    Current packs/day: 0.25    Average packs/day: 0.3 packs/day for 30.0 years (7.5 ttl pk-yrs)    Types: Cigarettes   Smokeless Tobacco Never     Family History:   Family History   Problem Relation Age of Onset    Heart disease Mother     Cancer Father     Multiple sclerosis Sister        Meds/Allergies   Current Facility-Administered Medications   Medication Dose Route Frequency Provider Last Rate Last Admin    acetaminophen (TYLENOL) tablet 975 mg  975 mg Oral Q8H PRN Westley Gonzalez MD        albuterol (PROVENTIL HFA,VENTOLIN HFA) inhaler 2 puff  2 puff Inhalation Q4H PRN Westley Gonzalez MD        albuterol inhalation solution 2.5 mg  2.5 mg Nebulization Q6H PRN Westley Gonzalez MD        aspirin  chewable tablet 81 mg  81 mg Oral Daily Westley Gonzalez MD   81 mg at 03/08/24 1447    atorvastatin (LIPITOR) tablet 10 mg  10 mg Oral Daily Westley Gonzalez MD   10 mg at 03/08/24 1447    calcitriol (ROCALTROL) capsule 0.25 mcg  0.25 mcg Oral Daily Westley Gonzalez MD   0.25 mcg at 03/08/24 1447    cyanocobalamin (VITAMIN B-12) tablet 100 mcg  100 mcg Oral Daily Westley Gonzalez MD   100 mcg at 03/08/24 1447    dextrose 5 % and sodium chloride 0.9 % infusion  125 mL/hr Intravenous Continuous Carmenza Melendez  mL/hr at 03/08/24 1115 125 mL/hr at 03/08/24 1115    enoxaparin (LOVENOX) subcutaneous injection 80 mg  1 mg/kg Subcutaneous Q12H RADAMES Westley Gonzalez MD   80 mg at 03/08/24 1150    ferrous sulfate tablet 325 mg  325 mg Oral Daily With Breakfast Westley Gonzalez MD   325 mg at 03/08/24 1448    fludrocortisone (FLORINEF) tablet 0.2 mg  0.2 mg Oral Daily Westley Gonzalez MD   0.2 mg at 03/08/24 1456    [START ON 3/9/2024] hydrocortisone (CORTEF) tablet 30 mg  30 mg Oral Q24H Carmenza Melendez DO        [START ON 3/9/2024] hydrocortisone (CORTEF) tablet 40 mg  40 mg Oral Q24H Carmenza Melendez DO        insulin glargine (LANTUS) subcutaneous injection 10 Units 0.1 mL  10 Units Subcutaneous QAM Carmenza Melendez DO   10 Units at 03/08/24 1149    insulin lispro (HumALOG/ADMELOG) 100 units/mL subcutaneous injection 1-5 Units  1-5 Units Subcutaneous TID AC Carmenza Melendez DO   1 Units at 03/08/24 1149    midodrine (PROAMATINE) tablet 15 mg  15 mg Oral TID AC Westley Gonzalez MD        potassium chloride oral solution 40 mEq  40 mEq Oral Daily With Breakfast Westley Gonzalez MD   40 mEq at 03/08/24 1447    risperiDONE (RisperDAL) tablet 1 mg  1 mg Oral BID Westley Gonzalez MD   1 mg at 03/08/24 1447    sertraline (ZOLOFT) tablet 100 mg  100 mg Oral Daily Westley Gonzalez MD   100 mg at 03/08/24 1448    sodium chloride tablet 1 g  1 g Oral TID Westley Gonzalez MD   1 g at 03/08/24 1448     Allergies   Allergen Reactions    Imipramine Other (See  "Comments)    Lisinopril Other (See Comments)    Paroxetine Other (See Comments)    Penicillins Hives    Rosiglitazone Other (See Comments)    Troglitazone Other (See Comments)       Objective   Vitals: Blood pressure 162/72, pulse 77, temperature 98.9 °F (37.2 °C), temperature source Oral, resp. rate 18, height 5' 4\" (1.626 m), weight 81.8 kg (180 lb 4.8 oz), SpO2 97%.    Intake/Output Summary (Last 24 hours) at 3/8/2024 1635  Last data filed at 3/8/2024 1210  Gross per 24 hour   Intake 1500 ml   Output 2065 ml   Net -565 ml     Invasive Devices       Peripheral Intravenous Line  Duration             Peripheral IV 03/07/24 Distal;Left;Upper;Ventral (anterior) Arm <1 day              Drain  Duration             Urethral Catheter Coude 16 Fr. <1 day                    Physical Exam  Constitutional:       General: He is not in acute distress.     Appearance: Normal appearance. He is not ill-appearing, toxic-appearing or diaphoretic.   HENT:      Head: Normocephalic and atraumatic.      Nose: Nose normal.   Eyes:      Extraocular Movements: Extraocular movements intact.      Conjunctiva/sclera: Conjunctivae normal.      Pupils: Pupils are equal, round, and reactive to light.   Cardiovascular:      Rate and Rhythm: Normal rate.   Pulmonary:      Effort: Pulmonary effort is normal. No respiratory distress.   Abdominal:      General: There is no distension.   Musculoskeletal:         General: No deformity.      Right lower leg: No edema.      Left lower leg: No edema.   Skin:     General: Skin is warm and dry.   Neurological:      General: No focal deficit present.      Mental Status: He is alert. Mental status is at baseline.   Psychiatric:         Mood and Affect: Mood normal.         Behavior: Behavior normal.         Thought Content: Thought content normal.         The history was obtained from the review of the chart, patient.    Lab Results:       Lab Results   Component Value Date    WBC 6.52 03/08/2024    HGB 10.1 " "(L) 03/08/2024    HCT 31.6 (L) 03/08/2024    MCV 99 (H) 03/08/2024     (L) 03/08/2024     Lab Results   Component Value Date/Time    BUN 18 03/08/2024 05:32 AM    BUN 20 01/05/2024 04:50 AM    K 3.5 03/08/2024 05:32 AM    K 2.8 (L) 01/05/2024 04:50 AM     03/08/2024 05:32 AM     01/05/2024 04:50 AM    CO2 23 03/08/2024 05:32 AM    CO2 30 01/05/2024 04:50 AM    CREATININE 0.99 03/08/2024 05:32 AM    CREATININE 1.06 01/05/2024 04:50 AM    AST 14 03/08/2024 05:32 AM    AST 14 01/02/2024 10:40 AM    ALT 10 03/08/2024 05:32 AM    ALT 16 01/02/2024 10:40 AM    TP 5.2 (L) 03/08/2024 05:32 AM    TP 5.2 (L) 01/02/2024 10:40 AM    ALB 3.1 (L) 03/08/2024 05:32 AM    ALB 3.1 (L) 01/02/2024 10:40 AM     No results for input(s): \"CHOL\", \"HDL\", \"LDL\", \"TRIG\", \"VLDL\" in the last 72 hours.  No results found for: \"MICROALBUR\", \"[id number]\"  POC Glucose (mg/dl)   Date Value   03/08/2024 219 (H)   03/08/2024 178 (H)   03/08/2024 158 (H)   03/08/2024 180 (H)   03/08/2024 175 (H)   03/08/2024 165 (H)   03/08/2024 154 (H)   03/08/2024 141 (H)   03/08/2024 130   03/08/2024 107       Imaging Studies: I have personally reviewed pertinent reports.      Carmenza Melendez  Endocrinology PGY-4    Please Tigertext questions to the physician covering the \"[id number]\" Role. Thank you.   "

## 2024-03-08 NOTE — PROGRESS NOTES
[facility]  Progress Note  Name: Karson You I  MRN: [id number]  Unit/Bed#: [facility] 512-01 I Date of Admission: 3/7/2024   Date of Service: 3/8/2024 I Hospital Day: 1    Assessment/Plan   * Acute metabolic encephalopathy  Assessment & Plan  Presented with 1 day of confusion, generalized weakness, poor oral intake, cough and difficulty swallowing concerning for aspiration.  Also with reported fever and 1 episode of vomiting and diarrhea.  Noted to be significant hypoglycemic (blood sugar of 35) with slight elevation in creatinine.  All these are likely contributing to his metabolic encephalopathy.  Started on D5 normal saline and hyperglycemics held.  CT head: No acute findings.  Given concern for aspiration, CXR obtained without evidence of infectious process despite mild leukocytosis on admission and reports of fever prior to admission.    Leukocytosis has resolved.  Patient is afebrile.  Patient failed bedside speech eval.  Maintain NPO status for now.  Speech therapy consulted and patient will undergo VBS.  No indication for antibiotics at this time.  Consider aspiration pneumonitis.    Elevated serum creatinine  Assessment & Plan  Baseline creatinine:  0.9-1.2.    Creatinine on admission 1.47.    Creatinine today: 0.99  Patient failed urinary retention protocol so has beebe catheter in place.  Will need voiding trial prior to discharge.    Elevated troponin level not due myocardial infarction  Assessment & Plan  EKG with nonspecific T-wave changes.  Asymptomatic.  Suspect non-ischemic troponin elevation secondary to metabolic derangements.    Hypomagnesemia  Assessment & Plan  Magnesium level low @ 1.3  Replete with IV supplementation and monitor.    Type 1 diabetes mellitus with hypoglycemia (HCC)  Assessment & Plan  Lab Results   Component Value Date    HGBA1C 8.8 (H) 11/11/2023     Recent Labs     03/08/24  0528 03/08/24  0627 03/08/24  0834 03/08/24  1040   POCGLU 175*  180* 158* 178*       Blood Sugar Average: Last 72 hrs:  (P) 128  Noted history of brittle diabetes and follow-up with [facility] endocrinology.  Significant hypoglycemia on admission with blood sugar of 35.  Likely secondary to poor oral intake.  Patient is maintained on D5 NS with improved blood sugars.  Endocrinology consulted and appreciate their input.    Paroxysmal atrial fibrillation (HCC)  Assessment & Plan  Maintained on Eliquis.  Not on beta-blockers    History of CVA (cerebrovascular accident)  Assessment & Plan  Stabel on ASA and statin.  CT head without acute changes.    Orthostatic hypotension  Assessment & Plan  Continue midodrine and salt tablets.    Adrenal insufficiency (HCC)  Assessment & Plan  Continue Florinef 0.2 mg daily and Solu-Cortef.  On midodrine and salt tablets for orthostatic hypotension.      VTE Pharmacologic Prophylaxis: VTE Score: 3 Moderate Risk (Score 3-4) - Pharmacological DVT Prophylaxis Ordered: enoxaparin (Lovenox).    Mobility:   JH-HLM Achieved: 2: Bed activities/Dependent transfer  HLM Goal NOT achieved. Continue with multidisciplinary rounding and encourage appropriate mobility to improve upon HLM goals.    Patient Centered Rounds: I performed bedside rounds with nursing staff today.   Discussions with Specialists or Other Care Team Provider: None    Education and Discussions with Family / Patient: Updated  (daughter) via phone.    Total Time Spent on Date of Encounter in care of patient:  mins. This time was spent on one or more of the following: performing physical exam; counseling and coordination of care; obtaining or reviewing history; documenting in the medical record; reviewing/ordering tests, medications or procedures; communicating with other healthcare professionals and discussing with patient's family/caregivers.    Current Length of Stay: 1 day(s)  Current Patient Status: Inpatient   Certification Statement: The patient will continue to require  additional inpatient hospital stay due to blood sugar control  Discharge Plan: Anticipate discharge in 24-48 hrs to discharge location to be determined pending rehab evaluations.    Code Status: Level 1 - Full Code    Subjective:   Patient is sitting in the bedside chair when I enter the room.  He is awake and alert and appears at baseline.  He knows he's at [facility] because his blood sugar was low.  He has no complaints currently.    Objective:     Vitals:   Temp (24hrs), Av.8 °F (37.1 °C), Min:98.5 °F (36.9 °C), Max:98.9 °F (37.2 °C)    Temp:  [98.5 °F (36.9 °C)-98.9 °F (37.2 °C)] 98.9 °F (37.2 °C)  HR:  [71-85] 74  Resp:  [12-24] 18  BP: ()/(52-81) 149/67  SpO2:  [96 %-100 %] 97 %  Body mass index is 30.95 kg/m².     Input and Output Summary (last 24 hours):     Intake/Output Summary (Last 24 hours) at 3/8/2024 1224  Last data filed at 3/8/2024 1210  Gross per 24 hour   Intake 1500 ml   Output 2065 ml   Net -565 ml       Physical Exam:   Physical Exam  Vitals and nursing note reviewed.   Constitutional:       General: He is not in acute distress.     Appearance: He is well-developed.   HENT:      Head: Normocephalic and atraumatic.   Eyes:      Conjunctiva/sclera: Conjunctivae normal.   Cardiovascular:      Rate and Rhythm: Normal rate and regular rhythm.      Heart sounds: No murmur heard.  Pulmonary:      Effort: Pulmonary effort is normal. No respiratory distress.      Breath sounds: Normal breath sounds.   Abdominal:      Palpations: Abdomen is soft.      Tenderness: There is no abdominal tenderness.   Musculoskeletal:         General: No swelling.      Cervical back: Neck supple.   Skin:     General: Skin is warm and dry.   Neurological:      Mental Status: He is alert.   Psychiatric:         Mood and Affect: Mood normal.        Additional Data:     Labs:  Results from last 7 days   Lab Units 24  0532 24  1728   WBC Thousand/uL 6.52 11.29*   HEMOGLOBIN g/dL 10.1* 11.9*   HEMATOCRIT %  31.6* 36.4*   PLATELETS Thousands/uL 135* 205   BANDS PCT %  --  5   LYMPHO PCT %  --  5*   MONO PCT %  --  13*   EOS PCT %  --  2     Results from last 7 days   Lab Units 03/08/24  0532   SODIUM mmol/L 136   POTASSIUM mmol/L 3.5   CHLORIDE mmol/L 105   CO2 mmol/L 23   BUN mg/dL 18   CREATININE mg/dL 0.99   ANION GAP mmol/L 8   CALCIUM mg/dL 7.6*   ALBUMIN g/dL 3.1*   TOTAL BILIRUBIN mg/dL 0.62   ALK PHOS U/L 51   ALT U/L 10   AST U/L 14   GLUCOSE RANDOM mg/dL 183*     Results from last 7 days   Lab Units 03/07/24  1728   INR  1.24*     Results from last 7 days   Lab Units 03/08/24  1040 03/08/24  0834 03/08/24  0627 03/08/24  0528 03/08/24  0438 03/08/24  0333 03/08/24  0223 03/08/24  0122 03/08/24  0017 03/07/24  2322 03/07/24  2213 03/07/24  2043   POC GLUCOSE mg/dl 178* 158* 180* 175* 165* 154* 141* 130 107 114 110 95         Results from last 7 days   Lab Units 03/07/24  1728   LACTIC ACID mmol/L 1.7   PROCALCITONIN ng/ml 1.08*       Lines/Drains:  Invasive Devices       Peripheral Intravenous Line  Duration             Peripheral IV 03/07/24 Distal;Left;Upper;Ventral (anterior) Arm <1 day              Drain  Duration             Urethral Catheter Coude 16 Fr. <1 day                  Urinary Catheter:  Goal for removal:  attempt voiding trial prior to discharge         Imaging: Reviewed radiology reports from this admission including: chest xray and CT head    Recent Cultures (last 7 days):   Results from last 7 days   Lab Units 03/07/24  1939 03/07/24  1700   BLOOD CULTURE  Received in Microbiology Lab. Culture in Progress. Received in Microbiology Lab. Culture in Progress.       Last 24 Hours Medication List:   Current Facility-Administered Medications   Medication Dose Route Frequency Provider Last Rate    acetaminophen  975 mg Oral Q8H PRN Westley Gonzalez MD      albuterol  2 puff Inhalation Q4H PRN Westley Gonzalez MD      albuterol  2.5 mg Nebulization Q6H PRN Westley Gonzalez MD      aspirin  81 mg Oral Daily  Westley Gonzalez MD      atorvastatin  10 mg Oral Daily Westley Gonzalez MD      calcitriol  0.25 mcg Oral Daily Westley Gonzalez MD      cyanocobalamin  100 mcg Oral Daily Westley Gonzalez MD      dextrose 5 % and sodium chloride 0.9 %  125 mL/hr Intravenous Continuous Trevon Gibson  mL/hr (03/08/24 1115)    enoxaparin  1 mg/kg Subcutaneous Q12H RADAMES Westley Gonzalez MD      ferrous sulfate  325 mg Oral Daily With Breakfast Westley Gonzalez MD      fludrocortisone  0.2 mg Oral Daily Westley Gonzalez MD      hydrocortisone sodium succinate  30 mg Intravenous Q24H Carmenza Melendez DO      hydrocortisone sodium succinate  40 mg Intravenous QAM Carmenza Melendez DO      insulin glargine  10 Units Subcutaneous QAM Carmenza Melendez DO      insulin lispro  1-5 Units Subcutaneous TID AC Carmenza Melendez DO      magnesium sulfate  4 g Intravenous Once Carmenza Alonso PA-C 4 g (03/08/24 1210)    midodrine  15 mg Oral TID AC Westley Gonzalez MD      potassium chloride  40 mEq Oral Daily With Breakfast Westley Gonzalez MD      risperiDONE  1 mg Oral BID Westley Gonzalez MD      sertraline  100 mg Oral Daily Westley Gonzalez MD      sodium chloride  1 g Oral TID Westley Gonzalez MD          Today, Patient Was Seen By: Carmenza Alonso PA-C    **Please Note: This note may have been constructed using a voice recognition system.**

## 2024-03-08 NOTE — ASSESSMENT & PLAN NOTE
Lab Results   Component Value Date    HGBA1C 8.8 (H) 11/11/2023       Recent Labs     03/07/24  1647 03/07/24  1857 03/07/24  1932 03/07/24 2043   POCGLU 55* 63* 105 95       Blood Sugar Average: Last 72 hrs:  (P) 79.5  Noted history of brittle diabetes and follow-up with [facility] endocrinology.  Blood glucose noted to be 35 on admission.  Likely secondary to poor oral intake today.  Daughter reported that he got his basal insulin this morning.  Monitor glucose every 1 hour with D5 normal saline.  Consult endocrinology  Hold off insulin for now.  Monitor closely for DKA

## 2024-03-08 NOTE — ED NOTES
Pt failed dysphagia screening. Dr. Luna notified.      Susy Solo RN  03/07/24 1726         Susy Solo RN  03/08/24 0292

## 2024-03-08 NOTE — ASSESSMENT & PLAN NOTE
Presented with 1 day of confusion, generalized weakness, poor oral intake, cough and difficulty swallowing concerning for aspiration.  Also with reported fever and 1 episode of vomiting and diarrhea.  Noted to be significant hypoglycemic with slight elevation in creatinine.  All these are likely contributing to his metabolic encephalopathy.  Patient is alert but slightly confused and very slow in responding to questions.  No focal deficits however did appreciate slight slurred speech.  He is oriented to place and time needed some time to think about it.  He referred to his daughter as his friend then corrected himself.  He is now started on D5 normal saline.  Monitor glucose level every hour for now.  Obtain CT head to rule out any acute intracranial process.  He is currently afebrile, however given reported fever of 102 at home.  Will obtain infectious workup with UA,  blood cultures and stool studies.  Chest x-ray reviewed and I could not appreciate any consolidation.  Will follow-up formal read.  COVID-19/flu are negative.  Noted slightly elevated procalcitonin and mild leukocytosis.  Monitor closely off antibiotics for now awaiting workup.  Given concerns for aspiration.  Will place patient is n.p.o. until obtaining bedside swallowing test.  May need VBS.  Aspiration precautions  Monitor CBC and BMP  Neurochecks

## 2024-03-08 NOTE — PHYSICAL THERAPY NOTE
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]  Dysphagia [R13.10]    Problem List  Patient Active Problem List   Diagnosis    Hypotension/syncope    Fall    Adrenal insufficiency (HCC)    Orthostatic hypotension    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HCC)    History of CVA (cerebrovascular accident)    Paroxysmal atrial fibrillation (HCC)    Recurrent hypoglycemia    Type 1 diabetes mellitus with hypoglycemia (HCC)    Paroxysmal atrial fibrillation     History of stroke    Anemia of chronic disease    Psychiatric disorder    Type 2 MI (myocardial infarction) (HCC)    Unintentional weight loss    Unspecified protein-calorie malnutrition (HCC)    Syncope    Iron deficiency anemia, unspecified    Cerebrovascular accident (CVA) (HCC)    Spells of decreased attentiveness    Hypomagnesemia    Shock (HCC)    Elevated troponin level not due myocardial infarction    Acute metabolic encephalopathy    Prolonged Q-T interval on ECG    Chronic diastolic heart failure (HCC)    Vitamin D deficiency    Elevated serum creatinine       Past Medical History  Past Medical History:   Diagnosis Date    A-fib (HCC)     Adrenal insufficiency (HCC)     Atrial fibrillation (HCC)     Diabetes mellitus (HCC)     Obesity, morbid (HCC) 11/14/2022    Stroke (HCC)        Past Surgical History  History reviewed. No pertinent surgical history.       03/08/24 1040   PT Last Visit   PT Visit Date 03/08/24   Note Type   Note type Evaluation   Pain Assessment   Pain Assessment Tool 0-10   Pain Score No Pain   Restrictions/Precautions   Weight Bearing Precautions Per Order No   Other Precautions Cognitive;Chair Alarm;Bed Alarm;Contact/isolation;Multiple lines;Telemetry;Fall Risk   Home Living   Type of Home House  (1/2 a house)   Home Layout Two level  (5STE)   Home Equipment Walker  (initially reports he has no AD,  later reports he uses a RW PTA)   Additional Comments Inconsistent reports, ? historian.  Pt reports he mostly lives on the first floor except when his daughter helps him upstairs to shower.   Prior Function   Level of [place] Independent with functional mobility   Lives With Friend(s)   Receives Help From Family   Falls in the last 6 months 0   General   Family/Caregiver Present No   Cognition   Overall Cognitive Status Impaired   Arousal/Participation Alert   Orientation Level Oriented to person;Oriented to place;Disoriented to time;Disoriented to situation   Memory Decreased recall of precautions;Decreased recall of recent events   Following Commands Follows one step commands with increased time or repetition   Comments Pt with some decreased safety awareness and insight.   Subjective   Subjective Pleasant and agreeable to participate.   RLE Assessment   RLE Assessment   (functionally 3+/5)   LLE Assessment   LLE Assessment   (functionally 3+/5)   Bed Mobility   Supine to Sit 4  Minimal assistance   Additional items Assist x 1;HOB elevated;Increased time required;Verbal cues;LE management   Additional Comments Left OOB in chair with chair alarm intact and all needs in reach.   Transfers   Sit to Stand 3  Moderate assistance   Additional items Assist x 1;Increased time required;Verbal cues   Stand to Sit 3  Moderate assistance   Additional items Assist x 1;Increased time required;Verbal cues   Additional Comments with RW   Ambulation/Elevation   Gait pattern Excessively slow;Short stride;Ataxia;Decreased foot clearance;Improper Weight shift   Gait Assistance 3  Moderate assist   Additional items Assist x 1;Verbal cues;Tactile cues   Assistive Device Rolling walker   Distance 3 ft from bed to chair  (pt refusing additional at this time)   Balance   Static Sitting Fair   Dynamic Sitting Fair   Static Standing Fair -   Dynamic Standing Poor +   Ambulatory Poor   Activity Tolerance   Activity Tolerance Patient  limited by fatigue   Medical Staff Made Aware CARLA Pierre   Nurse Made Aware RN cleared pt to be seen by PT   Assessment   Prognosis Good   Problem List Decreased strength;Decreased endurance;Impaired balance;Decreased mobility;Decreased cognition;Impaired judgement;Decreased safety awareness   Assessment Pt seen for high complexity PT evaluation due to decrease in functional mobility status compared to baseline.  Pt with active PT eval/treat orders at this time.  Pt is a 69 y.o. M who presented to [facility] with weakness, confusion, cough, difficulty swallowing, decreased urine output, fever, vomiting on 3/7/24.  Pt primary dx is acute metabolic encephalopathy.  Pt  has a past medical history of A-fib (HCC), Adrenal insufficiency (HCC), Atrial fibrillation (HCC), Diabetes mellitus (HCC), Obesity, morbid (HCC) (11/14/2022), and Stroke ([facility]).  Pt resides with friend in half a house with 5STE.  Pt presents with decreased strength, balance, endurance that contribute to limitations in bed mobility, functional transfers, functional mobility.  Pt requires Min A for bed mobility, Mod A for functional transfers and short distance mobility at this time.  Pt left upright in bedside chair with chair alarm intact and with all needs in reach.  Pt will benefit from skilled therapy in order to address current impairments and functional limitations. PT to follow pt and recommending rehab once medically cleared.  The patient's AM-PAC Basic Mobility Inpatient Short Form Raw Score is 13. A Raw score of less than or equal to 16 suggests the patient may benefit from discharge to post-acute rehabilitation services. Please also refer to the recommendation of the Physical Therapist for safe discharge planning.   Barriers to Discharge Decreased caregiver support;Inaccessible home environment   Goals   Patient Goals to go to the chair   [facility] Expiration Date 03/22/24   Short Term Goal #1 1. Pt will demonstrate ability to perform all  aspects of bed mobility with I in order to increase independence and decrease burden on caregivers. 2. Pt will demonstrate ability to perform functional transfers with Mod I in order to increase independence and decrease burden on caregivers.  3. Pt will demonstrate ability to ambulate 200 ft with least restrictive AD with Mod I in order to return to mobility safely. 4. Pt will demonstrate ability to negotiate full flight steps with/without HR and Mod I in order to return to household/community mobility safely. 5. Pt will demonstrate improved balance by one grade order to decrease risk of falls.  6. Pt will increase b/l LE strength by 1 grade in order to increase ease of functional mobility and transfers.   Plan   Treatment/Interventions Functional transfer training;LE strengthening/ROM;Elevations;Therapeutic exercise;Endurance training;Patient/family training;Equipment eval/education;Gait training;Bed mobility;Spoke to nursing   PT Frequency 3-5x/wk   Discharge Recommendation   Rehab Resource Intensity Level, PT II (Moderate Resource Intensity)   AM-PAC Basic Mobility Inpatient   Turning in Flat Bed Without Bedrails 3   Lying on Back to Sitting on Edge of Flat Bed Without Bedrails 3   Moving Bed to Chair 2   Standing Up From Chair Using Arms 2   Walk in Room 2   Climb 3-5 Stairs With Railing 1   Basic Mobility Inpatient Raw Score 13   Basic Mobility Standardized Score 33.99   Highest Level Of Mobility   JH-HLM Goal 4: Move to chair/commode   JH-HLM Achieved 4: Move to chair/commode   Modified Chandler Scale   Modified Chandler Scale 4     Time In: 1030  Time Out: 1040  Total Time: 10 minutes       S:  Pt agreeable to LE therex.  O:  Pt educated on and performed the following exercises for his HEP: hip flexion, knee extension, ankle pumps (10x b/l LE).  A:  Pt tolerated well and demonstrates understanding of HEP.  Will benefit from additional review next session 2/2 cog impairments.  P:  PT to continue to follow and see  pt as appropriate and able.       Maranda Bryan, PT, DPT

## 2024-03-08 NOTE — PROCEDURES
Speech-Language Pathology Video Barium Swallow Study        Patient Name: Karson You    Today's Date: 3/8/2024     Problem List  Patient Active Problem List   Diagnosis    Hypotension/syncope    Fall    Adrenal insufficiency (HCC)    Orthostatic hypotension    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HCC)    History of CVA (cerebrovascular accident)    Paroxysmal atrial fibrillation (HCC)    Recurrent hypoglycemia    Type 1 diabetes mellitus with hypoglycemia (HCC)    Paroxysmal atrial fibrillation     History of stroke    Anemia of chronic disease    Psychiatric disorder    Type 2 MI (myocardial infarction) (HCC)    Unintentional weight loss    Unspecified protein-calorie malnutrition (HCC)    Syncope    Iron deficiency anemia, unspecified    Cerebrovascular accident (CVA) (HCC)    Spells of decreased attentiveness    Hypomagnesemia    Shock (HCC)    Elevated troponin level not due myocardial infarction    Acute metabolic encephalopathy    Prolonged Q-T interval on ECG    Chronic diastolic heart failure (HCC)    Vitamin D deficiency    Elevated serum creatinine       Past Medical History  Past Medical History:   Diagnosis Date    A-fib (HCC)     Adrenal insufficiency (HCC)     Atrial fibrillation (HCC)     Diabetes mellitus (HCC)     Obesity, morbid (HCC) 11/14/2022    Stroke (HCC)        Past Surgical History  History reviewed. No pertinent surgical history.      General Information;  Pt is a 69 y.o. male who presented to [facility] by his daughter d/t generalized weakness, confusion, coughing, swallowing difficulty, minimal urine output ,reported fever of 102 at home and 1 episode of vomiting and loose stool.  On presentation, he was noted to be afebrile with stable vital signs.  Laboratory workup is notable for significant hypoglycemia of 35, mild leukocytosis and slightly elevated creatinine.  He was given D5W and admitted to [facility] service. SLP consult requested due to reported  dysphagia. Per RN he failed bedside swallow eval on admission and has been NPO pending SLP consult since. Patient does admit to occ coughing at home with po since CVA. He is known to this department and seen during admission in Dec of 2023. He was on a diet of dysphagia 3 with thin liquids at that time and subsequently upgraded to regular however f/u visits not completed due to d/c.  VBS requested by MANJEET at this time to further assess. Lungs clear on imaging    Speech/Swallow Mech:   Facial: symmetrical  Labial: WFL  Lingual: WFL  Velum: symmetrical  Mandible: adequate ROM  Dentition:  edentulous upper, natural lower  Vocal quality:clear/adequate   Volitional Cough: strong/productive   Respiratory: on RA    Previous VBS: none    Consistencies Assessed and Performance   Pt was seen in radiology for a Video Barium Swallow Study, seated in the upright position and viewed laterally with the following consistencies:     Administered: thin liquids, nectar thick, puree, and hard solids  Specific materials administered: Barium pudding, hard cookie, nectar thick, thin liquids, 13mm barium pill. Liquids were administered by tsp and cup    Results are as follows:     **Images are available for review on PACS    Oral stage:  Lip closure: adequate  Mastication: prolonged/min reduced with solid  Bolus formation: mildly reduced  Bolus control: mildly reduced  Transfer: min reduced + lingual pumping  Residue: min- none + piecemeal deglutition    Pharyngeal stage:  Swallow promptness: prompt-min delayed  Spill to valleculae: observed across consistencies (mod-max)  Spill to pyriforms: observed with liquids  Epiglottic inversion: adequate  Laryngeal rise: adequate  Pharyngeal constriction: GWFL  Vallecular retention: min-none (with NT and reg)  Pyriform retention: none  PPW coating: none  Osteophytes: -  CP prominence: not visualized  Retropulsion from prominence: -  Transient penetration: -  Epiglottic undercoat: observed with  consecutive sips of thins ( trace amounts)  Penetration: observed with consecutive sips of thin liquids  Aspiration: none      Screening of Esophageal stage:  Brief screening after presentation of regualr solids did not reveal any obvious stricture, retention or reflux. This is not a formal assessment of the esophagus and limited to small amounts in the upright position.     Penetration/Aspiration:  Thin: PAS - 1 ( 3 with consecutive sips)  Nectar: PAS- 1  Honey: PAS-N/A  Puree: PAS 1  Solid: PAS1  Response to Aspiration: N/a  Strategies/Efficacy: smaller sips eliminated penetration           8-Point Penetration-Aspiration Scale   1 Material does not enter the airway   2 Material enters the airway, remains above the vocal folds, and is ejected  from the  airway    3 Material enters the airway, remains above the vocal folds, and is not ejected from the airway   4 Material enters the airway, contacts the vocal folds, and is ejected from the airway   5 Material enters the airway, contacts the vocal folds, and is not ejected from the airway    6 Material enters the airway, passes below the vocal folds and is ejected into the larynx or out of the airway    7 Material enters the airway, passes below the vocal folds, and is not ejected from the trachea despite effort    8 Material enters the airway, passes below the vocal folds, and no effort is made to eject            Assessment Summary;  Pt presents with mild oropharyngeal dysphagia characterized as described above with primary concerns including reduced mastication/oral processing with premature spill and penetration with consecutive sips of thin liquids. Although no aspiration was noted on today's study there is a risk for episodes of penetration/aspiration which would be increased with impulsivity.    Recommendations;  Recommend soft/level 3 diet and thin liquids, with upright posture, slow rate of feeding, and small bites/sips.   Recommended Form of Meds:  as  tolerated      Aspiration precautions   Reflux Precautions    Results reviewed with patient, RN, and MD.      Goals:  Pt will tolerate least restrictive diet w/out s/s aspiration or oral/pharyngeal difficulties.    Dysphagia Goals: pt will tolerate dysphagia 3/regular textures with thin liquids without s/s of aspiration x3    Will f/u      Taya Esteves M.S., CCC-SLP  Speech Language Pathologist   Available via [employer]  [place] #[id number]  [place] #[id number]

## 2024-03-09 ENCOUNTER — HOME HEALTH ADMISSION (OUTPATIENT)
Dept: HOME HEALTH SERVICES | Facility: HOME HEALTHCARE | Age: 69
End: 2024-03-09
Payer: COMMERCIAL

## 2024-03-09 LAB
ANION GAP SERPL CALCULATED.3IONS-SCNC: 10 MMOL/L
BASOPHILS # BLD AUTO: 0.01 THOUSANDS/ÂΜL (ref 0–0.1)
BASOPHILS NFR BLD AUTO: 0 % (ref 0–1)
BUN SERPL-MCNC: 13 MG/DL (ref 5–25)
CALCIUM SERPL-MCNC: 7.9 MG/DL (ref 8.4–10.2)
CHLORIDE SERPL-SCNC: 107 MMOL/L (ref 96–108)
CO2 SERPL-SCNC: 24 MMOL/L (ref 21–32)
CREAT SERPL-MCNC: 0.81 MG/DL (ref 0.6–1.3)
EOSINOPHIL # BLD AUTO: 0.03 THOUSAND/ÂΜL (ref 0–0.61)
EOSINOPHIL NFR BLD AUTO: 1 % (ref 0–6)
ERYTHROCYTE [DISTWIDTH] IN BLOOD BY AUTOMATED COUNT: 14.6 % (ref 11.6–15.1)
GFR SERPL CREATININE-BSD FRML MDRD: 91 ML/MIN/1.73SQ M
GLUCOSE SERPL-MCNC: 153 MG/DL (ref 65–140)
GLUCOSE SERPL-MCNC: 160 MG/DL (ref 65–140)
GLUCOSE SERPL-MCNC: 238 MG/DL (ref 65–140)
GLUCOSE SERPL-MCNC: 294 MG/DL (ref 65–140)
GLUCOSE SERPL-MCNC: 302 MG/DL (ref 65–140)
HCT VFR BLD AUTO: 30.7 % (ref 36.5–49.3)
HGB BLD-MCNC: 9.7 G/DL (ref 12–17)
IMM GRANULOCYTES # BLD AUTO: 0.02 THOUSAND/UL (ref 0–0.2)
IMM GRANULOCYTES NFR BLD AUTO: 0 % (ref 0–2)
LYMPHOCYTES # BLD AUTO: 1.18 THOUSANDS/ÂΜL (ref 0.6–4.47)
LYMPHOCYTES NFR BLD AUTO: 25 % (ref 14–44)
MAGNESIUM SERPL-MCNC: 1.9 MG/DL (ref 1.9–2.7)
MCH RBC QN AUTO: 31.3 PG (ref 26.8–34.3)
MCHC RBC AUTO-ENTMCNC: 31.6 G/DL (ref 31.4–37.4)
MCV RBC AUTO: 99 FL (ref 82–98)
MONOCYTES # BLD AUTO: 0.71 THOUSAND/ÂΜL (ref 0.17–1.22)
MONOCYTES NFR BLD AUTO: 15 % (ref 4–12)
NEUTROPHILS # BLD AUTO: 2.78 THOUSANDS/ÂΜL (ref 1.85–7.62)
NEUTS SEG NFR BLD AUTO: 59 % (ref 43–75)
NRBC BLD AUTO-RTO: 0 /100 WBCS
PLATELET # BLD AUTO: 105 THOUSANDS/UL (ref 149–390)
PMV BLD AUTO: 10.6 FL (ref 8.9–12.7)
POTASSIUM SERPL-SCNC: 3.4 MMOL/L (ref 3.5–5.3)
RBC # BLD AUTO: 3.1 MILLION/UL (ref 3.88–5.62)
SODIUM SERPL-SCNC: 141 MMOL/L (ref 135–147)
WBC # BLD AUTO: 4.73 THOUSAND/UL (ref 4.31–10.16)

## 2024-03-09 PROCEDURE — 83735 ASSAY OF MAGNESIUM: CPT | Performed by: PHYSICIAN ASSISTANT

## 2024-03-09 PROCEDURE — 80048 BASIC METABOLIC PNL TOTAL CA: CPT | Performed by: PHYSICIAN ASSISTANT

## 2024-03-09 PROCEDURE — 82948 REAGENT STRIP/BLOOD GLUCOSE: CPT

## 2024-03-09 PROCEDURE — 85025 COMPLETE CBC W/AUTO DIFF WBC: CPT | Performed by: PHYSICIAN ASSISTANT

## 2024-03-09 PROCEDURE — 99232 SBSQ HOSP IP/OBS MODERATE 35: CPT | Performed by: NURSE PRACTITIONER

## 2024-03-09 RX ORDER — INSULIN LISPRO 100 [IU]/ML
1-5 INJECTION, SOLUTION INTRAVENOUS; SUBCUTANEOUS
Status: DISCONTINUED | OUTPATIENT
Start: 2024-03-09 | End: 2024-03-11 | Stop reason: HOSPADM

## 2024-03-09 RX ADMIN — ATORVASTATIN CALCIUM 10 MG: 10 TABLET, FILM COATED ORAL at 08:37

## 2024-03-09 RX ADMIN — FERROUS SULFATE TAB 325 MG (65 MG ELEMENTAL FE) 325 MG: 325 (65 FE) TAB at 08:38

## 2024-03-09 RX ADMIN — INSULIN LISPRO 3 UNITS: 100 INJECTION, SOLUTION INTRAVENOUS; SUBCUTANEOUS at 22:22

## 2024-03-09 RX ADMIN — ENOXAPARIN SODIUM 80 MG: 80 INJECTION SUBCUTANEOUS at 20:30

## 2024-03-09 RX ADMIN — CALCITRIOL 0.25 MCG: 0.25 CAPSULE, LIQUID FILLED ORAL at 08:37

## 2024-03-09 RX ADMIN — RISPERIDONE 1 MG: 1 TABLET ORAL at 17:17

## 2024-03-09 RX ADMIN — POTASSIUM CHLORIDE 40 MEQ: 1.5 SOLUTION ORAL at 08:38

## 2024-03-09 RX ADMIN — Medication 100 MCG: at 08:37

## 2024-03-09 RX ADMIN — INSULIN GLARGINE 10 UNITS: 100 INJECTION, SOLUTION SUBCUTANEOUS at 08:37

## 2024-03-09 RX ADMIN — SODIUM CHLORIDE 1 G: 1 TABLET ORAL at 20:30

## 2024-03-09 RX ADMIN — MIDODRINE HYDROCHLORIDE 15 MG: 5 TABLET ORAL at 11:24

## 2024-03-09 RX ADMIN — ASPIRIN 81 MG CHEWABLE TABLET 81 MG: 81 TABLET CHEWABLE at 08:38

## 2024-03-09 RX ADMIN — INSULIN LISPRO 1 UNITS: 100 INJECTION, SOLUTION INTRAVENOUS; SUBCUTANEOUS at 08:39

## 2024-03-09 RX ADMIN — HYDROCORTISONE 30 MG: 20 TABLET ORAL at 13:44

## 2024-03-09 RX ADMIN — FLUDROCORTISONE ACETATE 0.2 MG: 0.1 TABLET ORAL at 11:25

## 2024-03-09 RX ADMIN — MIDODRINE HYDROCHLORIDE 15 MG: 5 TABLET ORAL at 17:17

## 2024-03-09 RX ADMIN — HYDROCORTISONE 40 MG: 20 TABLET ORAL at 08:39

## 2024-03-09 RX ADMIN — SODIUM CHLORIDE 1 G: 1 TABLET ORAL at 17:17

## 2024-03-09 RX ADMIN — SODIUM CHLORIDE 1 G: 1 TABLET ORAL at 08:38

## 2024-03-09 RX ADMIN — SERTRALINE 100 MG: 100 TABLET, FILM COATED ORAL at 08:38

## 2024-03-09 RX ADMIN — ENOXAPARIN SODIUM 80 MG: 80 INJECTION SUBCUTANEOUS at 08:38

## 2024-03-09 RX ADMIN — RISPERIDONE 1 MG: 1 TABLET ORAL at 08:38

## 2024-03-09 RX ADMIN — INSULIN LISPRO 2 UNITS: 100 INJECTION, SOLUTION INTRAVENOUS; SUBCUTANEOUS at 11:45

## 2024-03-09 RX ADMIN — INSULIN LISPRO 3 UNITS: 100 INJECTION, SOLUTION INTRAVENOUS; SUBCUTANEOUS at 17:16

## 2024-03-09 NOTE — PROGRESS NOTES
[facility]  Progress Note  Name: Karson You I  MRN: [id number]  Unit/Bed#: [facility] 512-01 I Date of Admission: 3/7/2024   Date of Service: 3/9/2024 I Hospital Day: 2    Assessment/Plan   * Acute metabolic encephalopathy  Assessment & Plan  Presented with 1 day of confusion, generalized weakness, poor oral intake, cough and difficulty swallowing concerning for aspiration.  Also with reported fever and 1 episode of vomiting and diarrhea (likely gi bug as pts daughter reports 3/9 that she got it after her dad) .    Noted to be significant hypoglycemic (blood sugar of 35 once he arrived in the ED ) with slight elevation in creatinine.  All these are likely contributing to his metabolic encephalopathy, now improving .  Started on D5 normal saline and hyperglycemics held.  CT head: No acute findings.  Given concern for aspiration, CXR obtained without evidence of infectious process despite mild leukocytosis on admission and reports of fever prior to admission.    Leukocytosis has resolved.  Patient is afebrile.  Patient failed bedside speech eval.    Speech therapy consulted sp VBS and determined pt can be started on level 3 dysphagia with thin liquids  No indication for antibiotics at this time.  Consider aspiration pneumonitis.    Elevated serum creatinine  Assessment & Plan  Baseline creatinine:  0.9-1.2.    Creatinine on admission 1.47.    Creatinine today: 0.99  Patient failed urinary retention protocol so has beebe catheter in place.  Will need voiding trial prior to discharge.    Elevated troponin level not due myocardial infarction  Assessment & Plan  EKG with nonspecific T-wave changes.  Asymptomatic.  Suspect non-ischemic troponin elevation secondary to metabolic derangements.    Hypomagnesemia  Assessment & Plan  Magnesium level low @ 1.3  Replete with IV supplementation and monitor.    Hypokalemia  Assessment & Plan  Pt noted to have potassium of 3.2 in setting of poor  oral intake  On daily 40 meq potassium liquid  Chk level in am     Type 1 diabetes mellitus with hypoglycemia (HCC)  Assessment & Plan  Lab Results   Component Value Date    HGBA1C 8.8 (H) 11/11/2023     Recent Labs     03/08/24  1531 03/08/24 2018 03/09/24  0610 03/09/24  1135   POCGLU 219* 212* 160* 238*       Blood Sugar Average: Last 72 hrs:  (P) 128  Noted history of brittle diabetes and follow-up with [facility] endocrinology.  Significant hypoglycemia on admission with blood sugar of 35.  Likely secondary to poor oral intake.  Patient is maintained on D5 NS with improved blood sugars.  Endocrinology consulted and appreciate their input.    Paroxysmal atrial fibrillation (HCC)  Assessment & Plan  Maintained on Eliquis.  Not on beta-blockers    History of CVA (cerebrovascular accident)  Assessment & Plan  Stabel on ASA and statin.  CT head without acute changes.    Orthostatic hypotension  Assessment & Plan  Continue midodrine and salt tablets.    Adrenal insufficiency (HCC)  Assessment & Plan  Continue Florinef 0.2 mg daily and Solu-Cortef.  Pt takes 20mg in am and 15mg at night currently on 30mg am and 40mg pm   Will plan to return to pts home regimen in the am if stable, as recommended by endocrinology   On midodrine and salt tablets for orthostatic hypotension.             VTE Pharmacologic Prophylaxis: VTE Score: 3 High Risk (Score >/= 5) - Pharmacological DVT Prophylaxis Ordered: enoxaparin (Lovenox). Sequential Compression Devices Ordered.    Mobility:   Basic Mobility Inpatient Raw Score: 13  JH-HLM Goal: 4: Move to chair/commode  JH-HLM Achieved: 2: Bed activities/Dependent transfer  HLM Goal NOT achieved. Continue with multidisciplinary rounding and encourage appropriate mobility to improve upon HLM goals.    Patient Centered Rounds: I performed bedside rounds with nursing staff today.   Discussions with Specialists or Other Care Team Provider: nursing     Education and Discussions with Family /  Patient: Updated  (daughter) via phone.    Total Time Spent on Date of Encounter in care of patient: 25 mins. This time was spent on one or more of the following: performing physical exam; counseling and coordination of care; obtaining or reviewing history; documenting in the medical record; reviewing/ordering tests, medications or procedures; communicating with other healthcare professionals and discussing with patient's family/caregivers.    Current Length of Stay: 2 day(s)  Current Patient Status: Inpatient   Certification Statement: The patient will continue to require additional inpatient hospital stay due to ongoing tx and observation   Discharge Plan: Anticipate discharge in 48-72 hrs to home with home services. Daughter requests discharge home with vna rather than rehab he does better at home    Code Status: Level 1 - Full Code    Subjective:   Patient is not very talkative.  Patient's daughter verifies that this is how it is.  Patient sitting on edge of bed eating denied doing well but eating a little fast discussed with him in regards to slowing down with his eating and swallowing.  Patient has no other complaints no nausea vomiting no chest pain or shortness of breath.    Objective:     Vitals:   Temp (24hrs), Av.5 °F (36.9 °C), Min:98.1 °F (36.7 °C), Max:99.4 °F (37.4 °C)    Temp:  [98.1 °F (36.7 °C)-99.4 °F (37.4 °C)] 98.1 °F (36.7 °C)  HR:  [70-95] 77  Resp:  [16-18] 16  BP: (114-169)/(56-96) 154/82  SpO2:  [95 %-99 %] 98 %  Body mass index is 30.95 kg/m².     Input and Output Summary (last 24 hours):     Intake/Output Summary (Last 24 hours) at 3/9/2024 1718  Last data filed at 3/9/2024 1624  Gross per 24 hour   Intake 1020 ml   Output 2020 ml   Net -1000 ml       Physical Exam:   Physical Exam  Constitutional:       General: He is not in acute distress.     Appearance: He is not ill-appearing, toxic-appearing or diaphoretic.   HENT:      Head: Normocephalic and atraumatic.   Eyes:       General:         Right eye: No discharge.         Left eye: No discharge.   Cardiovascular:      Rate and Rhythm: Normal rate.      Heart sounds: No murmur heard.     No gallop.   Pulmonary:      Effort: No respiratory distress.      Breath sounds: No stridor. No wheezing, rhonchi or rales.   Chest:      Chest wall: No tenderness.   Abdominal:      General: There is no distension.      Palpations: There is no mass.      Tenderness: There is no abdominal tenderness. There is no guarding or rebound.      Hernia: No hernia is present.   Musculoskeletal:         General: No swelling, tenderness, deformity or signs of injury.      Right lower leg: No edema.      Left lower leg: No edema.   Skin:     Coloration: Skin is not jaundiced or pale.      Findings: No bruising, erythema, lesion or rash.   Neurological:      Mental Status: He is alert and oriented to person, place, and time.   Psychiatric:         Behavior: Behavior normal.          Additional Data:     Labs:  Results from last 7 days   Lab Units 03/09/24 0446 03/08/24  0532 03/07/24  1728   WBC Thousand/uL 4.73   < > 11.29*   HEMOGLOBIN g/dL 9.7*   < > 11.9*   HEMATOCRIT % 30.7*   < > 36.4*   PLATELETS Thousands/uL 105*   < > 205   BANDS PCT %  --   --  5   NEUTROS PCT % 59  --   --    LYMPHS PCT % 25  --   --    LYMPHO PCT %  --   --  5*   MONOS PCT % 15*  --   --    MONO PCT %  --   --  13*   EOS PCT % 1  --  2    < > = values in this interval not displayed.     Results from last 7 days   Lab Units 03/09/24 0446 03/08/24  0532   SODIUM mmol/L 141 136   POTASSIUM mmol/L 3.4* 3.5   CHLORIDE mmol/L 107 105   CO2 mmol/L 24 23   BUN mg/dL 13 18   CREATININE mg/dL 0.81 0.99   ANION GAP mmol/L 10 8   CALCIUM mg/dL 7.9* 7.6*   ALBUMIN g/dL  --  3.1*   TOTAL BILIRUBIN mg/dL  --  0.62   ALK PHOS U/L  --  51   ALT U/L  --  10   AST U/L  --  14   GLUCOSE RANDOM mg/dL 153* 183*     Results from last 7 days   Lab Units 03/07/24  1728   INR  1.24*     Results from last  7 days   Lab Units 03/09/24  1605 03/09/24  1135 03/09/24  0610 03/08/24  2018 03/08/24  1531 03/08/24  1040 03/08/24  0834 03/08/24  0627 03/08/24  0528 03/08/24  0438 03/08/24  0333 03/08/24  0223   POC GLUCOSE mg/dl 294* 238* 160* 212* 219* 178* 158* 180* 175* 165* 154* 141*         Results from last 7 days   Lab Units 03/07/24  1728   LACTIC ACID mmol/L 1.7   PROCALCITONIN ng/ml 1.08*       Lines/Drains:  Invasive Devices       Peripheral Intravenous Line  Duration             Peripheral IV 03/07/24 Distal;Left;Upper;Ventral (anterior) Arm 1 day              Drain  Duration             Urethral Catheter Coude 16 Fr. 1 day                  Urinary Catheter:  Goal for removal:  Grimaldo placed on 3/7/2024 coudé cath               Imaging: Reviewed radiology reports from this admission including: Video barium swallow    Recent Cultures (last 7 days):   Results from last 7 days   Lab Units 03/07/24  1939 03/07/24  1700   BLOOD CULTURE  No Growth at 24 hrs. No Growth at 24 hrs.       Last 24 Hours Medication List:   Current Facility-Administered Medications   Medication Dose Route Frequency Provider Last Rate    acetaminophen  975 mg Oral Q8H PRN Westley Gonzalez MD      albuterol  2 puff Inhalation Q4H PRN Westley Gonzalez MD      albuterol  2.5 mg Nebulization Q6H PRN Westley Gonzalez MD      aspirin  81 mg Oral Daily Westley Gonzalez MD      atorvastatin  10 mg Oral Daily Westley Gonzalez MD      calcitriol  0.25 mcg Oral Daily Westley Gonzalez MD      cyanocobalamin  100 mcg Oral Daily Westley Gonzalez MD      enoxaparin  1 mg/kg Subcutaneous Q12H [facility] Westley Gonzalez MD      ferrous sulfate  325 mg Oral Daily With Breakfast Westley Gonzalez MD      fludrocortisone  0.2 mg Oral Daily Westley Gonzalez MD      hydrocortisone  30 mg Oral Q24H Carmenza Melendez DO      hydrocortisone  40 mg Oral Q24H Carmenza Melendez DO      insulin glargine  10 Units Subcutaneous QAM Carmenza Melendez DO      insulin lispro  1-5 Units Subcutaneous TID BRANDON Herr  DO Al      midodrine  15 mg Oral TID AC Westley Gonzalez MD      potassium chloride  40 mEq Oral Daily With Breakfast Westley Gonzalez MD      risperiDONE  1 mg Oral BID Westley Gonzalez MD      sertraline  100 mg Oral Daily Westley Gonzalez MD      sodium chloride  1 g Oral TID Westley Gonzalez MD          Today, Patient Was Seen By: NACHO Moon    **Please Note: This note may have been constructed using a voice recognition system.**     Bilobed Transposition Flap Text: The defect edges were debeveled with a #15 scalpel blade.  Given the location of the defect and the proximity to free margins a bilobed transposition flap was deemed most appropriate.  Using a sterile surgical marker, an appropriate bilobe flap drawn around the defect.    The area thus outlined was incised deep to adipose tissue with a #15 scalpel blade.  The skin margins were undermined to an appropriate distance in all directions utilizing iris scissors.

## 2024-03-09 NOTE — ASSESSMENT & PLAN NOTE
Presented with 1 day of confusion, generalized weakness, poor oral intake, cough and difficulty swallowing concerning for aspiration.  Also with reported fever and 1 episode of vomiting and diarrhea (likely gi bug as pts daughter reports 3/9 that she got it after her dad) .    Noted to be significant hypoglycemic (blood sugar of 35 once he arrived in the ED ) with slight elevation in creatinine.  All these are likely contributing to his metabolic encephalopathy, now improving .  Started on D5 normal saline and hyperglycemics held.  CT head: No acute findings.  Given concern for aspiration, CXR obtained without evidence of infectious process despite mild leukocytosis on admission and reports of fever prior to admission.    Leukocytosis has resolved.  Patient is afebrile.  Patient failed bedside speech eval.    Speech therapy consulted sp VBS and determined pt can be started on level 3 dysphagia with thin liquids  No indication for antibiotics at this time.  Consider aspiration pneumonitis.

## 2024-03-09 NOTE — CASE MANAGEMENT
Case Management Assessment & Discharge Planning Note    Patient name Karson You  Location [facility] 512/[facility] 512-01 MRN [id number]  : 1955 Date 3/9/2024       Current Admission Date: 3/7/2024  Current Admission Diagnosis:Acute metabolic encephalopathy   Patient Active Problem List    Diagnosis Date Noted    Elevated serum creatinine 2024    Vitamin D deficiency 2024    Chronic diastolic heart failure (HCC) 2023    Prolonged Q-T interval on ECG 2023    Shock (HCC) 2023    Elevated troponin level not due myocardial infarction 2023    Acute metabolic encephalopathy 2023    Hypomagnesemia 2023    Hypotension/syncope 11/10/2023    Fall 11/10/2023    Adrenal insufficiency (HCC) 11/10/2023    Orthostatic hypotension 11/10/2023    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HCC) 11/10/2023    History of CVA (cerebrovascular accident) 11/10/2023    Paroxysmal atrial fibrillation (HCC) 11/10/2023    Spells of decreased attentiveness 2023    Cerebrovascular accident (CVA) (HCC) 2023    Iron deficiency anemia, unspecified 2023    Syncope 2023    Unspecified protein-calorie malnutrition (HCC) 2022    Unintentional weight loss 2022    Type 2 MI (myocardial infarction) (HCC) 2022    Recurrent hypoglycemia 10/19/2022    Type 1 diabetes mellitus with hypoglycemia ([facility]) 10/19/2022    Paroxysmal atrial fibrillation  10/19/2022    History of stroke 10/19/2022    Anemia of chronic disease 10/19/2022    Psychiatric disorder 10/19/2022      LOS (days): 2  Geometric Mean LOS (GMLOS) (days): 4.1  Days to GMLOS:2.5     OBJECTIVE:    Risk of Unplanned Readmission Score: 39.67         Current admission status: Inpatient       Preferred Pharmacy:   [id number] - [place] - [address] [place] [address]  [place] [address]  Phone: [phone] Fax: [phone]    [facility] [place] - [address] [address] [address]  [address]  [place] [address]  Phone: [phone] Fax: [phone]    [id number] - [place] - [address]  [address]  [place] [address]  Phone: [phone] Fax: [phone]    [facility] - [place] - [address]  [address] [facility] [place] [address]  Phone: [phone] Fax: [phone]    Primary Care Provider: Teri Block MD    Primary Insurance: [employer]  appening  Secondary Insurance:     ASSESSMENT:  Active Health Care Proxies    There are no active Health Care Proxies on file.                 Readmission Root Cause  30 Day Readmission: No    Patient Information  Admitted from:: Home  Mental Status: Alert  During Assessment patient was accompanied by: Daughter (via phone)  Primary Caregiver: Self  Support Systems: Spouse/significant other, Daughter, Private Caregivers, Self  County of Residence: [place]  What city do you live in?: [place]  Home entry access options. Select all that apply.: Stairs  Number of steps to enter home.: 5  Do the steps have railings?: Yes  Type of Current Residence: [address] [place] [address] home  Upon entering residence, is there a bedroom on the main floor (no further steps)?: Yes  Upon entering residence, is there a bathroom on the main floor (no further steps)?: Yes  Living Arrangements: Lives w/ Spouse/significant other, Lives w/ Extended Family (Lives w/ his girlfriend and his sister)    Activities of Daily Living Prior to Admission  Functional Status: Assistance  Completes ADLs independently?: No  Level of ADL dependence: Assistance  Ambulates independently?: Yes  Does patient use assisted devices?: Yes  Assisted Devices (DME) used: Walker  Does patient currently own DME?: Yes  What DME does the patient currently own?: Walker  Does patient have a history of Outpatient Therapy (PT/OT)?: No  Does the patient have a history of Short-Term Rehab?: Yes ([facility])  Does patient have a history of HHC?: Yes  ([facility])  Does patient currently have HHC?: No         Patient Information Continued  Income Source: SSI/SSD  Does patient have prescription coverage?: Yes  Does patient receive dialysis treatments?: No  Does patient have a history of substance abuse?: No  Does patient have a history of Mental Health Diagnosis?: No         Means of Transportation  Means of Transport to Appts:: Family transport      Social Determinants of Health (SDOH)      Flowsheet Row Most Recent Value   Housing Stability    In the last 12 months, was there a time when you were not able to pay the mortgage or rent on time? N   In the last 12 months, how many places have you lived? 2   In the last 12 months, was there a time when you did not have a steady place to sleep or slept in a shelter (including now)? N   Transportation Needs    In the past 12 months, has lack of transportation kept you from medical appointments or from getting medications? no   In the past 12 months, has lack of transportation kept you from meetings, work, or from getting things needed for daily living? No   Food Insecurity    Within the past 12 months, you worried that your food would run out before you got the money to buy more. Never true   Within the past 12 months, the food you bought just didn't last and you didn't have money to get more. Never true   Utilities    In the past 12 months has the electric, gas, oil, or water company threatened to shut off services in your home? No            DISCHARGE DETAILS:    Discharge planning discussed with:: Pt at bedside and his daughter  via phone  Freedom of Choice: Yes     CM contacted family/caregiver?: Yes (daughter- Ofelai)  Were Treatment Team discharge recommendations reviewed with patient/caregiver?: Yes  Did patient/caregiver verbalize understanding of patient care needs?: Yes  Were patient/caregiver advised of the risks associated with not following Treatment Team discharge recommendations?: Yes    Contacts  Patient  Contacts: Daughter- Ofelia  Relationship to Patient:: Family  Contact Method: Phone  Phone Number: [phone]  Reason/Outcome: Continuity of Care, Referral, Discharge Planning    Requested Home Health Care         Is the patient interested in HHC at discharge?: Yes  Home Health Discipline requested:: Nursing, Occupational Therapy, Physical Therapy  Home Health Agency Name:: [facility]  HHA External Referral Reason (only applicable if external HHA name selected): Patient has established relationship with provider  Home Health Follow-Up Provider:: PCP  Home Health Services Needed:: Evaluate Functional Status and Safety, Gait/ADL Training, Heart Failure Management, Strengthening/Theraputic Exercises to Improve Function  Homebound Criteria Met:: Uses an Assist Device (i.e. cane, walker, etc), Requires the Assistance of Another Person for Safe Ambulation or to Leave the Home  Supporting Clincal Findings:: Limited Endurance         Other Referral/Resources/Interventions Provided:  Referral Comments: Pt is rec'd for STR, but is refusing at this time. Pt is requesting HHC w/ [facility].  [facility] reserved in [facility]    Pt resides in a 2 story home(5 [address]) w/ his sister and his girlfriend. Pt stays on the 1st level. Pt requires some assistance w/ adls. He has a private caregiver  5 days per week (20 hours). Pt uses a RW at baseline. Pts daughter provides transportation to medical appointments.     CM met w/ Pt and his daughter to discuss rehab. Pt is refusing STR referrals at this time. Pt is requesting to return home w/ [facility].  [facility] reserved in [facility].

## 2024-03-09 NOTE — PROGRESS NOTES
Patient:  DAVID KELLY    MRN:  [id number]    [facility] Request ID:  [id number]    Level of care reserved:  Home Health Agency    Partner Reserved:  [facility], [place] [address] [phone]    Clinical needs requested:    Geography searched:  [id number]    Start of Service:    Request sent:  9:19am EST on 3/9/2024 by Dina Ch    Partner reserved:  9:23am EST on 3/9/2024 by Dina Ch    Choice list shared:  9:23am EST on 3/9/2024 by Dina Ch

## 2024-03-09 NOTE — ASSESSMENT & PLAN NOTE
Continue Florinef 0.2 mg daily and Solu-Cortef.  Pt takes 20mg in am and 15mg at night currently on 30mg am and 40mg pm   Will plan to return to pts home regimen in the am if stable, as recommended by endocrinology   On midodrine and salt tablets for orthostatic hypotension.

## 2024-03-09 NOTE — ASSESSMENT & PLAN NOTE
Lab Results   Component Value Date    HGBA1C 8.8 (H) 11/11/2023     Recent Labs     03/08/24  1531 03/08/24  2018 03/09/24  0610 03/09/24  1135   POCGLU 219* 212* 160* 238*       Blood Sugar Average: Last 72 hrs:  (P) 128  Noted history of brittle diabetes and follow-up with [facility]'s endocrinology.  Significant hypoglycemia on admission with blood sugar of 35.  Likely secondary to poor oral intake.  Patient is maintained on D5 NS with improved blood sugars.  Endocrinology consulted and appreciate their input.

## 2024-03-09 NOTE — ASSESSMENT & PLAN NOTE
Pt noted to have potassium of 3.2 in setting of poor oral intake  On daily 40 meq potassium liquid  Chk level in am

## 2024-03-10 LAB
ALBUMIN SERPL BCP-MCNC: 3.3 G/DL (ref 3.5–5)
ALP SERPL-CCNC: 68 U/L (ref 34–104)
ALT SERPL W P-5'-P-CCNC: 18 U/L (ref 7–52)
ANION GAP SERPL CALCULATED.3IONS-SCNC: 7 MMOL/L
AST SERPL W P-5'-P-CCNC: 15 U/L (ref 13–39)
BASOPHILS # BLD AUTO: 0 THOUSANDS/ÂΜL (ref 0–0.1)
BASOPHILS NFR BLD AUTO: 0 % (ref 0–1)
BILIRUB SERPL-MCNC: 0.78 MG/DL (ref 0.2–1)
BUN SERPL-MCNC: 14 MG/DL (ref 5–25)
CALCIUM ALBUM COR SERPL-MCNC: 8.4 MG/DL (ref 8.3–10.1)
CALCIUM SERPL-MCNC: 7.8 MG/DL (ref 8.4–10.2)
CHLORIDE SERPL-SCNC: 105 MMOL/L (ref 96–108)
CO2 SERPL-SCNC: 25 MMOL/L (ref 21–32)
CREAT SERPL-MCNC: 0.94 MG/DL (ref 0.6–1.3)
EOSINOPHIL # BLD AUTO: 0.04 THOUSAND/ÂΜL (ref 0–0.61)
EOSINOPHIL NFR BLD AUTO: 1 % (ref 0–6)
ERYTHROCYTE [DISTWIDTH] IN BLOOD BY AUTOMATED COUNT: 14.1 % (ref 11.6–15.1)
GFR SERPL CREATININE-BSD FRML MDRD: 83 ML/MIN/1.73SQ M
GLUCOSE SERPL-MCNC: 145 MG/DL (ref 65–140)
GLUCOSE SERPL-MCNC: 203 MG/DL (ref 65–140)
GLUCOSE SERPL-MCNC: 325 MG/DL (ref 65–140)
GLUCOSE SERPL-MCNC: 335 MG/DL (ref 65–140)
GLUCOSE SERPL-MCNC: 96 MG/DL (ref 65–140)
HCT VFR BLD AUTO: 30.7 % (ref 36.5–49.3)
HGB BLD-MCNC: 10.2 G/DL (ref 12–17)
IMM GRANULOCYTES # BLD AUTO: 0.01 THOUSAND/UL (ref 0–0.2)
IMM GRANULOCYTES NFR BLD AUTO: 0 % (ref 0–2)
LYMPHOCYTES # BLD AUTO: 1.73 THOUSANDS/ÂΜL (ref 0.6–4.47)
LYMPHOCYTES NFR BLD AUTO: 30 % (ref 14–44)
MCH RBC QN AUTO: 31.7 PG (ref 26.8–34.3)
MCHC RBC AUTO-ENTMCNC: 33.2 G/DL (ref 31.4–37.4)
MCV RBC AUTO: 95 FL (ref 82–98)
MONOCYTES # BLD AUTO: 0.56 THOUSAND/ÂΜL (ref 0.17–1.22)
MONOCYTES NFR BLD AUTO: 10 % (ref 4–12)
NEUTROPHILS # BLD AUTO: 3.42 THOUSANDS/ÂΜL (ref 1.85–7.62)
NEUTS SEG NFR BLD AUTO: 59 % (ref 43–75)
NRBC BLD AUTO-RTO: 0 /100 WBCS
PLATELET # BLD AUTO: 117 THOUSANDS/UL (ref 149–390)
PMV BLD AUTO: 11.5 FL (ref 8.9–12.7)
POTASSIUM SERPL-SCNC: 3.5 MMOL/L (ref 3.5–5.3)
PROT SERPL-MCNC: 5.6 G/DL (ref 6.4–8.4)
RBC # BLD AUTO: 3.22 MILLION/UL (ref 3.88–5.62)
SODIUM SERPL-SCNC: 137 MMOL/L (ref 135–147)
WBC # BLD AUTO: 5.76 THOUSAND/UL (ref 4.31–10.16)

## 2024-03-10 PROCEDURE — 85025 COMPLETE CBC W/AUTO DIFF WBC: CPT | Performed by: NURSE PRACTITIONER

## 2024-03-10 PROCEDURE — 80053 COMPREHEN METABOLIC PANEL: CPT | Performed by: NURSE PRACTITIONER

## 2024-03-10 PROCEDURE — 99232 SBSQ HOSP IP/OBS MODERATE 35: CPT | Performed by: NURSE PRACTITIONER

## 2024-03-10 PROCEDURE — 82948 REAGENT STRIP/BLOOD GLUCOSE: CPT

## 2024-03-10 RX ORDER — INSULIN GLARGINE 100 [IU]/ML
12 INJECTION, SOLUTION SUBCUTANEOUS EVERY MORNING
Status: DISCONTINUED | OUTPATIENT
Start: 2024-03-11 | End: 2024-03-11 | Stop reason: HOSPADM

## 2024-03-10 RX ORDER — HYDRALAZINE HYDROCHLORIDE 20 MG/ML
10 INJECTION INTRAMUSCULAR; INTRAVENOUS ONCE
Status: COMPLETED | OUTPATIENT
Start: 2024-03-10 | End: 2024-03-10

## 2024-03-10 RX ORDER — HYDRALAZINE HYDROCHLORIDE 20 MG/ML
5 INJECTION INTRAMUSCULAR; INTRAVENOUS ONCE
Status: COMPLETED | OUTPATIENT
Start: 2024-03-10 | End: 2024-03-10

## 2024-03-10 RX ORDER — POTASSIUM CHLORIDE 20MEQ/15ML
20 LIQUID (ML) ORAL ONCE
Status: COMPLETED | OUTPATIENT
Start: 2024-03-10 | End: 2024-03-10

## 2024-03-10 RX ORDER — INSULIN LISPRO 100 [IU]/ML
5 INJECTION, SOLUTION INTRAVENOUS; SUBCUTANEOUS
Status: DISCONTINUED | OUTPATIENT
Start: 2024-03-10 | End: 2024-03-11 | Stop reason: HOSPADM

## 2024-03-10 RX ORDER — HYDROCORTISONE 20 MG/1
20 TABLET ORAL EVERY 24 HOURS
Status: DISCONTINUED | OUTPATIENT
Start: 2024-03-11 | End: 2024-03-11 | Stop reason: HOSPADM

## 2024-03-10 RX ORDER — INSULIN GLARGINE 100 [IU]/ML
2 INJECTION, SOLUTION SUBCUTANEOUS ONCE
Status: COMPLETED | OUTPATIENT
Start: 2024-03-10 | End: 2024-03-10

## 2024-03-10 RX ADMIN — RISPERIDONE 1 MG: 1 TABLET ORAL at 17:03

## 2024-03-10 RX ADMIN — SODIUM CHLORIDE 1 G: 1 TABLET ORAL at 16:52

## 2024-03-10 RX ADMIN — HYDROCORTISONE 40 MG: 20 TABLET ORAL at 10:36

## 2024-03-10 RX ADMIN — HYDRALAZINE HYDROCHLORIDE 10 MG: 20 INJECTION INTRAMUSCULAR; INTRAVENOUS at 22:38

## 2024-03-10 RX ADMIN — MIDODRINE HYDROCHLORIDE 15 MG: 5 TABLET ORAL at 11:30

## 2024-03-10 RX ADMIN — SODIUM CHLORIDE 1 G: 1 TABLET ORAL at 21:44

## 2024-03-10 RX ADMIN — ENOXAPARIN SODIUM 80 MG: 80 INJECTION SUBCUTANEOUS at 08:13

## 2024-03-10 RX ADMIN — RISPERIDONE 1 MG: 1 TABLET ORAL at 08:12

## 2024-03-10 RX ADMIN — MIDODRINE HYDROCHLORIDE 15 MG: 5 TABLET ORAL at 08:20

## 2024-03-10 RX ADMIN — POTASSIUM CHLORIDE 20 MEQ: 1.5 SOLUTION ORAL at 14:30

## 2024-03-10 RX ADMIN — FLUDROCORTISONE ACETATE 0.2 MG: 0.1 TABLET ORAL at 08:14

## 2024-03-10 RX ADMIN — INSULIN LISPRO 1 UNITS: 100 INJECTION, SOLUTION INTRAVENOUS; SUBCUTANEOUS at 08:21

## 2024-03-10 RX ADMIN — INSULIN LISPRO 5 UNITS: 100 INJECTION, SOLUTION INTRAVENOUS; SUBCUTANEOUS at 16:52

## 2024-03-10 RX ADMIN — Medication 100 MCG: at 08:12

## 2024-03-10 RX ADMIN — FERROUS SULFATE TAB 325 MG (65 MG ELEMENTAL FE) 325 MG: 325 (65 FE) TAB at 08:12

## 2024-03-10 RX ADMIN — ASPIRIN 81 MG CHEWABLE TABLET 81 MG: 81 TABLET CHEWABLE at 08:12

## 2024-03-10 RX ADMIN — POTASSIUM CHLORIDE 40 MEQ: 1.5 SOLUTION ORAL at 08:12

## 2024-03-10 RX ADMIN — INSULIN LISPRO 3 UNITS: 100 INJECTION, SOLUTION INTRAVENOUS; SUBCUTANEOUS at 11:27

## 2024-03-10 RX ADMIN — CALCITRIOL 0.25 MCG: 0.25 CAPSULE, LIQUID FILLED ORAL at 08:12

## 2024-03-10 RX ADMIN — HYDRALAZINE HYDROCHLORIDE 5 MG: 20 INJECTION, SOLUTION INTRAMUSCULAR; INTRAVENOUS at 00:23

## 2024-03-10 RX ADMIN — SERTRALINE 100 MG: 100 TABLET, FILM COATED ORAL at 08:12

## 2024-03-10 RX ADMIN — ENOXAPARIN SODIUM 80 MG: 80 INJECTION SUBCUTANEOUS at 21:44

## 2024-03-10 RX ADMIN — INSULIN GLARGINE 10 UNITS: 100 INJECTION, SOLUTION SUBCUTANEOUS at 08:12

## 2024-03-10 RX ADMIN — ATORVASTATIN CALCIUM 10 MG: 10 TABLET, FILM COATED ORAL at 08:12

## 2024-03-10 RX ADMIN — HYDROCORTISONE 30 MG: 20 TABLET ORAL at 14:30

## 2024-03-10 RX ADMIN — ACETAMINOPHEN 975 MG: 325 TABLET, FILM COATED ORAL at 08:20

## 2024-03-10 RX ADMIN — INSULIN GLARGINE 2 UNITS: 100 INJECTION, SOLUTION SUBCUTANEOUS at 11:30

## 2024-03-10 RX ADMIN — INSULIN LISPRO 5 UNITS: 100 INJECTION, SOLUTION INTRAVENOUS; SUBCUTANEOUS at 11:27

## 2024-03-10 RX ADMIN — SODIUM CHLORIDE 1 G: 1 TABLET ORAL at 08:12

## 2024-03-10 NOTE — PROGRESS NOTES
[facility]  Progress Note  Name: Karson You I  MRN: [id number]  Unit/Bed#: [facility] 512-01 I Date of Admission: 3/7/2024   Date of Service: 3/10/2024 I Hospital Day: 3    Assessment/Plan   * Acute metabolic encephalopathy  Assessment & Plan  Presented with 1 day of confusion, generalized weakness, poor oral intake, cough and difficulty swallowing concerning for aspiration.  Also with reported fever and 1 episode of vomiting and diarrhea (likely gi bug as pts daughter reports 3/9 that she got it after her dad) .    Noted to be significant hypoglycemic (blood sugar of 35 once he arrived in the ED ) with slight elevation in creatinine.  All these are likely contributing to his metabolic encephalopathy, now improving .  Started on D5 normal saline and hyperglycemics held.  CT head: No acute findings.  Given concern for aspiration, CXR obtained without evidence of infectious process despite mild leukocytosis on admission and reports of fever prior to admission.    Leukocytosis has resolved.  Patient is afebrile.  Patient failed bedside speech eval initially  Speech therapy consulted sp VBS and determined pt can be started on level 3 dysphagia with thin liquids  No indication for antibiotics at this time.  Consider aspiration pneumonitis.    Elevated serum creatinine  Assessment & Plan  Baseline creatinine:  0.9-1.2.    Creatinine on admission 1.47.    Creatinine today: 0.94  Patient failed urinary retention protocol so has beebe catheter in place.  Will need voiding trial prior to discharge.  Removed beebe now monitor pvr     Elevated troponin level not due myocardial infarction  Assessment & Plan  EKG with nonspecific T-wave changes.  Asymptomatic.  Suspect non-ischemic troponin elevation secondary to metabolic derangements.    Hypomagnesemia  Assessment & Plan  Magnesium level now corrected   Resolved     Hypokalemia  Assessment & Plan  Pt noted to have potassium of 3.2 in setting  of poor oral intake  On daily 40 meq potassium liquid,    Chk level in am     Type 1 diabetes mellitus with hypoglycemia (HCC)  Assessment & Plan  Lab Results   Component Value Date    HGBA1C 8.8 (H) 11/11/2023     Recent Labs     03/09/24  2126 03/10/24  0625 03/10/24  1119 03/10/24  1603   POCGLU 302* 203* 325* 145*     Blood Sugar Average: Last 72 hrs:  (P) 128  Noted history of brittle diabetes and follow-up with [facility] endocrinology.  Significant hypoglycemia on admission with blood sugar of 35.    Endocrinology consulted and appreciate their input.   Lantus increased to 12 units every morning   Started on lispro 5 units TID with meals with the addition of SSI     Paroxysmal atrial fibrillation (HCC)  Assessment & Plan  Maintained on Eliquis.  Not on beta-blockers    History of CVA (cerebrovascular accident)  Assessment & Plan  Stabel on ASA and statin.  CT head without acute changes.    Orthostatic hypotension  Assessment & Plan  Continue midodrine and salt tablets.    Adrenal insufficiency (HCC)  Assessment & Plan  Continue Florinef 0.2 mg daily and Solu-Cortef.  Pt takes 20mg in am and 15mg at night currently on 30mg am and 40mg pm   Resumed pts home regimen in the am since he is now stable, as recommended by endocrinology per note on 3/9  On midodrine and salt tablets for orthostatic hypotension.           VTE Pharmacologic Prophylaxis: VTE Score: 3 High Risk (Score >/= 5) - Pharmacological DVT Prophylaxis Ordered: enoxaparin (Lovenox). Sequential Compression Devices Ordered.    Mobility:   Basic Mobility Inpatient Raw Score: 16  -HLM Goal: 5: Stand one or more mins  -HLM Achieved: 5: Stand (1 or more minutes)  Pt seen ambulating his room with beebe along side him today     Patient Centered Rounds: I performed bedside rounds with nursing staff today.   Discussions with Specialists or Other Care Team Provider: nursing     Education and Discussions with Family / Patient:  patient .     Total Time  Spent on Date of Encounter in care of patient: 30 mins. This time was spent on one or more of the following: performing physical exam; counseling and coordination of care; obtaining or reviewing history; documenting in the medical record; reviewing/ordering tests, medications or procedures; communicating with other healthcare professionals and discussing with patient's family/caregivers.    Current Length of Stay: 3 day(s)  Current Patient Status: Inpatient   Certification Statement: The patient will continue to require additional inpatient hospital stay due to    Discharge Plan: Anticipate discharge tomorrow to home with home services.    Code Status: Level 1 - Full Code    Subjective:   Pt is doing better today ambulating room by himself a little difficulty with beebe , we discussed removing the beebe and doing voiding trial. Pt did have a bm but denies any diarrhea     Objective:     Vitals:   Temp (24hrs), Av.1 °F (36.7 °C), Min:97.6 °F (36.4 °C), Max:98.4 °F (36.9 °C)    Temp:  [97.6 °F (36.4 °C)-98.4 °F (36.9 °C)] 98.1 °F (36.7 °C)  HR:  [69-88] 79  Resp:  [16] 16  BP: (133-184)/() 158/78  SpO2:  [93 %-99 %] 96 %  Body mass index is 30.95 kg/m².     Input and Output Summary (last 24 hours):     Intake/Output Summary (Last 24 hours) at 3/10/2024 1958  Last data filed at 3/10/2024 1708  Gross per 24 hour   Intake 1220 ml   Output 2430 ml   Net -1210 ml       Physical Exam:   Physical Exam  Constitutional:       General: He is not in acute distress.     Appearance: He is not ill-appearing, toxic-appearing or diaphoretic.   HENT:      Head: Normocephalic and atraumatic.      Nose: No congestion.   Eyes:      General:         Right eye: No discharge.         Left eye: No discharge.   Cardiovascular:      Rate and Rhythm: Normal rate.      Heart sounds: No murmur heard.     No friction rub. No gallop.   Pulmonary:      Effort: No respiratory distress.      Breath sounds: No stridor. No wheezing, rhonchi or  rales.   Chest:      Chest wall: No tenderness.   Abdominal:      General: There is no distension.      Palpations: There is no mass.      Tenderness: There is no abdominal tenderness. There is no guarding or rebound.      Hernia: No hernia is present.   Genitourinary:     Comments: Yellow urine beebe   Musculoskeletal:         General: No swelling, tenderness, deformity or signs of injury.      Right lower leg: No edema.      Left lower leg: No edema.   Skin:     Coloration: Skin is not jaundiced or pale.      Findings: No bruising, erythema, lesion or rash.   Neurological:      Mental Status: He is alert and oriented to person, place, and time.   Psychiatric:         Behavior: Behavior normal.          Additional Data:     Labs:  Results from last 7 days   Lab Units 03/10/24  0920 03/08/24  0532 03/07/24  1728   WBC Thousand/uL 5.76   < > 11.29*   HEMOGLOBIN g/dL 10.2*   < > 11.9*   HEMATOCRIT % 30.7*   < > 36.4*   PLATELETS Thousands/uL 117*   < > 205   BANDS PCT %  --   --  5   NEUTROS PCT % 59   < >  --    LYMPHS PCT % 30   < >  --    LYMPHO PCT %  --   --  5*   MONOS PCT % 10   < >  --    MONO PCT %  --   --  13*   EOS PCT % 1   < > 2    < > = values in this interval not displayed.     Results from last 7 days   Lab Units 03/10/24  0920   SODIUM mmol/L 137   POTASSIUM mmol/L 3.5   CHLORIDE mmol/L 105   CO2 mmol/L 25   BUN mg/dL 14   CREATININE mg/dL 0.94   ANION GAP mmol/L 7   CALCIUM mg/dL 7.8*   ALBUMIN g/dL 3.3*   TOTAL BILIRUBIN mg/dL 0.78   ALK PHOS U/L 68   ALT U/L 18   AST U/L 15   GLUCOSE RANDOM mg/dL 335*     Results from last 7 days   Lab Units 03/07/24  1728   INR  1.24*     Results from last 7 days   Lab Units 03/10/24  1603 03/10/24  1119 03/10/24  0625 03/09/24  2126 03/09/24  1605 03/09/24  1135 03/09/24  0610 03/08/24  2018 03/08/24  1531 03/08/24  1040 03/08/24  0834 03/08/24  0627   POC GLUCOSE mg/dl 145* 325* 203* 302* 294* 238* 160* 212* 219* 178* 158* 180*         Results from last 7 days    Lab Units 03/07/24  1728   LACTIC ACID mmol/L 1.7   PROCALCITONIN ng/ml 1.08*       Lines/Drains:  Invasive Devices       Peripheral Intravenous Line  Duration             Peripheral IV 03/07/24 Distal;Left;Upper;Ventral (anterior) Arm 3 days                  Urinary Catheter:  Goal for removal: No longer needed. Will place order to discontinue               Imaging: No pertinent imaging reviewed.    Recent Cultures (last 7 days):   Results from last 7 days   Lab Units 03/07/24  1939 03/07/24  1700   BLOOD CULTURE  No Growth at 48 hrs. No Growth at 48 hrs.       Last 24 Hours Medication List:   Current Facility-Administered Medications   Medication Dose Route Frequency Provider Last Rate    acetaminophen  975 mg Oral Q8H PRN Westley Gonzalez MD      albuterol  2 puff Inhalation Q4H PRN Westley Gonzalez MD      albuterol  2.5 mg Nebulization Q6H PRN Westley Gonzalez MD      aspirin  81 mg Oral Daily Westley Gonzalez MD      atorvastatin  10 mg Oral Daily Westley Gonzalez MD      calcitriol  0.25 mcg Oral Daily Westley Gonzalez MD      cyanocobalamin  100 mcg Oral Daily Westley Gonzalez MD      enoxaparin  1 mg/kg Subcutaneous Q12H RADAMES Westley Gonzalez MD      ferrous sulfate  325 mg Oral Daily With Breakfast Westley Gonzalez MD      fludrocortisone  0.2 mg Oral Daily Westley Gonzalez MD      [START ON 3/11/2024] hydrocortisone  15 mg Oral Q24H NACHO Moon      [START ON 3/11/2024] hydrocortisone  20 mg Oral Q24H NACHO Moon      [START ON 3/11/2024] insulin glargine  12 Units Subcutaneous QAM Grecia Whitaker DO      insulin lispro  1-5 Units Subcutaneous 4x Daily (AC & HS) NACHO Chamberlain      insulin lispro  5 Units Subcutaneous TID With Meals Grecia Whitaker DO      midodrine  15 mg Oral TID AC Westley Gonzalez MD      potassium chloride  40 mEq Oral Daily With Breakfast Westley Gonzalez MD      risperiDONE  1 mg Oral BID Westley Gonzalez MD      sertraline  100 mg Oral Daily Westley Gonzalez MD      sodium chloride  1 g Oral TID  Westley Gonzalez MD          Today, Patient Was Seen By: NACHO Moon    **Please Note: This note may have been constructed using a voice recognition system.**

## 2024-03-10 NOTE — ASSESSMENT & PLAN NOTE
Pt noted to have potassium of 3.2 in setting of poor oral intake  On daily 40 meq potassium liquid,    Chk level in am

## 2024-03-10 NOTE — ASSESSMENT & PLAN NOTE
Presented with 1 day of confusion, generalized weakness, poor oral intake, cough and difficulty swallowing concerning for aspiration.  Also with reported fever and 1 episode of vomiting and diarrhea (likely gi bug as pts daughter reports 3/9 that she got it after her dad) .    Noted to be significant hypoglycemic (blood sugar of 35 once he arrived in the ED ) with slight elevation in creatinine.  All these are likely contributing to his metabolic encephalopathy, now improving .  Started on D5 normal saline and hyperglycemics held.  CT head: No acute findings.  Given concern for aspiration, CXR obtained without evidence of infectious process despite mild leukocytosis on admission and reports of fever prior to admission.    Leukocytosis has resolved.  Patient is afebrile.  Patient failed bedside speech eval initially  Speech therapy consulted sp VBS and determined pt can be started on level 3 dysphagia with thin liquids  No indication for antibiotics at this time.  Consider aspiration pneumonitis.

## 2024-03-10 NOTE — QUICK NOTE
"Fingerstick blood glucose trends reviewed and reveal hyperglycemia.     Increase Lantus to 12 units every morning. Since he has already received his previous 10 unit dose this morning, will add 2 units to be given now. Initiate lispro 5 units TID with meals. Continue correctional scale algorithm 2 with meals and at bedtime. Avoid hypoglycemia and treat per protocol.    Continue hydrocortisone 40 mg every morning at 9 am and 30 mg every afternoon at 2 pm. Monitor hemodynamics and electrolytes.     Please Tigertext questions to the clinician covering the \"SID-Zoje-Tist\" Role. Thank you.  "

## 2024-03-10 NOTE — ASSESSMENT & PLAN NOTE
Baseline creatinine:  0.9-1.2.    Creatinine on admission 1.47.    Creatinine today: 0.94  Patient failed urinary retention protocol so has beebe catheter in place.  Will need voiding trial prior to discharge.  Removed beebe now monitor pvr

## 2024-03-10 NOTE — ASSESSMENT & PLAN NOTE
Continue Florinef 0.2 mg daily and Solu-Cortef.  Pt takes 20mg in am and 15mg at night currently on 30mg am and 40mg pm   Resumed pts home regimen in the am since he is now stable, as recommended by endocrinology per note on 3/9  On midodrine and salt tablets for orthostatic hypotension.

## 2024-03-11 VITALS
SYSTOLIC BLOOD PRESSURE: 142 MMHG | DIASTOLIC BLOOD PRESSURE: 82 MMHG | TEMPERATURE: 98.2 F | BODY MASS INDEX: 30.78 KG/M2 | RESPIRATION RATE: 17 BRPM | OXYGEN SATURATION: 96 % | WEIGHT: 180.3 LBS | HEIGHT: 64 IN | HEART RATE: 79 BPM

## 2024-03-11 DIAGNOSIS — I50.32 CHRONIC DIASTOLIC HEART FAILURE (HCC): ICD-10-CM

## 2024-03-11 PROBLEM — G93.41 ACUTE METABOLIC ENCEPHALOPATHY: Status: RESOLVED | Noted: 2023-12-14 | Resolved: 2024-03-11

## 2024-03-11 PROBLEM — R79.89 ELEVATED SERUM CREATININE: Status: RESOLVED | Noted: 2024-03-07 | Resolved: 2024-03-11

## 2024-03-11 PROBLEM — R79.89 ELEVATED TROPONIN LEVEL NOT DUE MYOCARDIAL INFARCTION: Status: RESOLVED | Noted: 2023-12-14 | Resolved: 2024-03-11

## 2024-03-11 LAB
ANION GAP SERPL CALCULATED.3IONS-SCNC: 12 MMOL/L
BASOPHILS # BLD AUTO: 0.01 THOUSANDS/ÂΜL (ref 0–0.1)
BASOPHILS NFR BLD AUTO: 0 % (ref 0–1)
BUN SERPL-MCNC: 13 MG/DL (ref 5–25)
CALCIUM SERPL-MCNC: 8.1 MG/DL (ref 8.4–10.2)
CHLORIDE SERPL-SCNC: 106 MMOL/L (ref 96–108)
CO2 SERPL-SCNC: 23 MMOL/L (ref 21–32)
CREAT SERPL-MCNC: 1.04 MG/DL (ref 0.6–1.3)
EOSINOPHIL # BLD AUTO: 0.01 THOUSAND/ÂΜL (ref 0–0.61)
EOSINOPHIL NFR BLD AUTO: 0 % (ref 0–6)
ERYTHROCYTE [DISTWIDTH] IN BLOOD BY AUTOMATED COUNT: 14.3 % (ref 11.6–15.1)
GFR SERPL CREATININE-BSD FRML MDRD: 73 ML/MIN/1.73SQ M
GLUCOSE SERPL-MCNC: 180 MG/DL (ref 65–140)
GLUCOSE SERPL-MCNC: 74 MG/DL (ref 65–140)
GLUCOSE SERPL-MCNC: 85 MG/DL (ref 65–140)
HCT VFR BLD AUTO: 33.8 % (ref 36.5–49.3)
HGB BLD-MCNC: 10.9 G/DL (ref 12–17)
IMM GRANULOCYTES # BLD AUTO: 0.03 THOUSAND/UL (ref 0–0.2)
IMM GRANULOCYTES NFR BLD AUTO: 1 % (ref 0–2)
LYMPHOCYTES # BLD AUTO: 1.72 THOUSANDS/ÂΜL (ref 0.6–4.47)
LYMPHOCYTES NFR BLD AUTO: 26 % (ref 14–44)
MAGNESIUM SERPL-MCNC: 1.5 MG/DL (ref 1.9–2.7)
MCH RBC QN AUTO: 31.6 PG (ref 26.8–34.3)
MCHC RBC AUTO-ENTMCNC: 32.2 G/DL (ref 31.4–37.4)
MCV RBC AUTO: 98 FL (ref 82–98)
MONOCYTES # BLD AUTO: 0.5 THOUSAND/ÂΜL (ref 0.17–1.22)
MONOCYTES NFR BLD AUTO: 8 % (ref 4–12)
NEUTROPHILS # BLD AUTO: 4.32 THOUSANDS/ÂΜL (ref 1.85–7.62)
NEUTS SEG NFR BLD AUTO: 65 % (ref 43–75)
NRBC BLD AUTO-RTO: 0 /100 WBCS
PLATELET # BLD AUTO: 128 THOUSANDS/UL (ref 149–390)
PMV BLD AUTO: 11.3 FL (ref 8.9–12.7)
POTASSIUM SERPL-SCNC: 3.4 MMOL/L (ref 3.5–5.3)
RBC # BLD AUTO: 3.45 MILLION/UL (ref 3.88–5.62)
SODIUM SERPL-SCNC: 141 MMOL/L (ref 135–147)
WBC # BLD AUTO: 6.59 THOUSAND/UL (ref 4.31–10.16)

## 2024-03-11 PROCEDURE — 94664 DEMO&/EVAL PT USE INHALER: CPT

## 2024-03-11 PROCEDURE — 83735 ASSAY OF MAGNESIUM: CPT | Performed by: NURSE PRACTITIONER

## 2024-03-11 PROCEDURE — 82948 REAGENT STRIP/BLOOD GLUCOSE: CPT

## 2024-03-11 PROCEDURE — 80048 BASIC METABOLIC PNL TOTAL CA: CPT | Performed by: NURSE PRACTITIONER

## 2024-03-11 PROCEDURE — 99239 HOSP IP/OBS DSCHRG MGMT >30: CPT | Performed by: INTERNAL MEDICINE

## 2024-03-11 PROCEDURE — 85025 COMPLETE CBC W/AUTO DIFF WBC: CPT | Performed by: NURSE PRACTITIONER

## 2024-03-11 RX ORDER — POTASSIUM CHLORIDE 750 MG/1
40 TABLET, FILM COATED, EXTENDED RELEASE ORAL 2 TIMES DAILY
Qty: 90 TABLET | Refills: 0 | Status: SHIPPED | OUTPATIENT
Start: 2024-03-11

## 2024-03-11 RX ORDER — POTASSIUM CHLORIDE 20MEQ/15ML
40 LIQUID (ML) ORAL
Status: DISCONTINUED | OUTPATIENT
Start: 2024-03-12 | End: 2024-03-11 | Stop reason: HOSPADM

## 2024-03-11 RX ORDER — MAGNESIUM SULFATE HEPTAHYDRATE 40 MG/ML
2 INJECTION, SOLUTION INTRAVENOUS ONCE
Status: COMPLETED | OUTPATIENT
Start: 2024-03-11 | End: 2024-03-11

## 2024-03-11 RX ORDER — INSULIN LISPRO 100 [IU]/ML
INJECTION, SOLUTION INTRAVENOUS; SUBCUTANEOUS
Qty: 30 ML | Refills: 0 | Status: SHIPPED | OUTPATIENT
Start: 2024-03-11

## 2024-03-11 RX ORDER — POTASSIUM CHLORIDE 20 MEQ/1
40 TABLET, EXTENDED RELEASE ORAL ONCE
Status: DISCONTINUED | OUTPATIENT
Start: 2024-03-11 | End: 2024-03-11

## 2024-03-11 RX ORDER — LANOLIN ALCOHOL/MO/W.PET/CERES
400 CREAM (GRAM) TOPICAL 2 TIMES DAILY
Qty: 60 TABLET | Refills: 0 | Status: SHIPPED | OUTPATIENT
Start: 2024-03-11

## 2024-03-11 RX ORDER — MIDODRINE HYDROCHLORIDE 10 MG/1
15 TABLET ORAL
Qty: 405 TABLET | Refills: 3 | Status: SHIPPED | OUTPATIENT
Start: 2024-03-11 | End: 2025-03-06

## 2024-03-11 RX ORDER — INSULIN GLARGINE 300 U/ML
12 INJECTION, SOLUTION SUBCUTANEOUS
Qty: 6 ML | Refills: 0 | Status: SHIPPED | OUTPATIENT
Start: 2024-03-11

## 2024-03-11 RX ADMIN — ENOXAPARIN SODIUM 80 MG: 80 INJECTION SUBCUTANEOUS at 09:45

## 2024-03-11 RX ADMIN — INSULIN LISPRO 5 UNITS: 100 INJECTION, SOLUTION INTRAVENOUS; SUBCUTANEOUS at 11:33

## 2024-03-11 RX ADMIN — ASPIRIN 81 MG CHEWABLE TABLET 81 MG: 81 TABLET CHEWABLE at 09:45

## 2024-03-11 RX ADMIN — INSULIN LISPRO 1 UNITS: 100 INJECTION, SOLUTION INTRAVENOUS; SUBCUTANEOUS at 11:33

## 2024-03-11 RX ADMIN — POTASSIUM CHLORIDE 40 MEQ: 1.5 SOLUTION ORAL at 06:39

## 2024-03-11 RX ADMIN — RISPERIDONE 1 MG: 1 TABLET ORAL at 09:45

## 2024-03-11 RX ADMIN — SERTRALINE 100 MG: 100 TABLET, FILM COATED ORAL at 09:45

## 2024-03-11 RX ADMIN — INSULIN LISPRO 5 UNITS: 100 INJECTION, SOLUTION INTRAVENOUS; SUBCUTANEOUS at 07:33

## 2024-03-11 RX ADMIN — FERROUS SULFATE TAB 325 MG (65 MG ELEMENTAL FE) 325 MG: 325 (65 FE) TAB at 06:39

## 2024-03-11 RX ADMIN — INSULIN GLARGINE 12 UNITS: 100 INJECTION, SOLUTION SUBCUTANEOUS at 09:45

## 2024-03-11 RX ADMIN — FLUDROCORTISONE ACETATE 0.2 MG: 0.1 TABLET ORAL at 09:46

## 2024-03-11 RX ADMIN — SODIUM CHLORIDE 1 G: 1 TABLET ORAL at 09:45

## 2024-03-11 RX ADMIN — ATORVASTATIN CALCIUM 10 MG: 10 TABLET, FILM COATED ORAL at 09:45

## 2024-03-11 RX ADMIN — CALCITRIOL 0.25 MCG: 0.25 CAPSULE, LIQUID FILLED ORAL at 09:45

## 2024-03-11 RX ADMIN — HYDROCORTISONE 20 MG: 20 TABLET ORAL at 09:46

## 2024-03-11 RX ADMIN — MAGNESIUM SULFATE HEPTAHYDRATE 2 G: 40 INJECTION, SOLUTION INTRAVENOUS at 09:45

## 2024-03-11 RX ADMIN — Medication 100 MCG: at 09:45

## 2024-03-11 RX ADMIN — MIDODRINE HYDROCHLORIDE 15 MG: 5 TABLET ORAL at 06:36

## 2024-03-11 NOTE — ASSESSMENT & PLAN NOTE
Presented with 1 day of confusion, generalized weakness, poor oral intake, cough and difficulty swallowing concerning for aspiration.  Also with reported fever and 1 episode of vomiting and diarrhea (likely gi bug as pts daughter reports 3/9 that she got it after her dad) .    Noted to be significant hypoglycemic (blood sugar of 35 once he arrived in the ED ) with slight elevation in creatinine.  All these are likely contributing to his metabolic encephalopathy, now improving .  Started on D5 normal saline and hyperglycemics held.  CT head: No acute findings.  Given concern for aspiration, CXR obtained without evidence of infectious process despite mild leukocytosis on admission and reports of fever prior to admission.    Leukocytosis has resolved.  Patient is afebrile.  Patient failed bedside speech eval initially  Speech therapy consulted sp VBS and determined pt can be started on soft regular foods, thin liquids at a slower rate  No indication for antibiotics at this time.  Consider aspiration pneumonitis.

## 2024-03-11 NOTE — ASSESSMENT & PLAN NOTE
Baseline creatinine:  0.9-1.2.    Creatinine on admission 1.47.    Recent Labs     03/09/24  0446 03/10/24  0920 03/11/24  0642   CREATININE 0.81 0.94 1.04   EGFR 91 83 73     Estimated Creatinine Clearance: 66.5 mL/min (by C-G formula based on SCr of 1.04 mg/dL).   Patient failed urinary retention protocol so has beebe catheter in place.  Will need voiding trial prior to discharge.  Removed beebe now monitor pvr

## 2024-03-11 NOTE — ASSESSMENT & PLAN NOTE
Continue Florinef 0.2 mg daily and Solu-Cortef.  Pt takes 20mg in am and 15mg at night currently on 30mg am and 40mg pm   Resumed pts home regimen in the am since he is now stable, as recommended by endocrinology per note on 3/9  On midodrine and salt tablets for orthostatic hypotension.    Continue with home regimen on discharge

## 2024-03-11 NOTE — ASSESSMENT & PLAN NOTE
Lab Results   Component Value Date    HGBA1C 8.8 (H) 11/11/2023     Recent Labs     03/10/24  1603 03/10/24  2117 03/11/24  0640 03/11/24  1026   POCGLU 145* 96 85 180*     Blood Sugar Average: Last 72 hrs:  (P) 128  noted history of brittle diabetes and follow-up with [facility] endocrinology.  Significant hypoglycemia on admission with blood sugar of 35.    Evaluated by endocrinology, while inpatient  Continue with Toujeo 12 units daily along with lispro 5 units 3 times daily AC, and sliding scale  Outpatient endocrinology follow-up recommended

## 2024-03-11 NOTE — NURSING NOTE
Discharge information given to patient and sister by another RN.  States they have no question.  Patient packed belongings.

## 2024-03-11 NOTE — DISCHARGE SUMMARY
[facility]  Discharge- Karson You 1955, 69 y.o. male MRN: [id number]  Unit/Bed#: [facility] 512-01 Encounter: [id number]  Primary Care Provider: Teri Block MD   Date and time admitted to hospital: 3/7/2024  4:48 PM    Hypomagnesemia  Assessment & Plan  Recent Labs     03/09/24  0446 03/11/24  0642   MG 1.9 1.5*     Repleted with IV mag on 3/11  Continue with magnesium 400 twice daily  Repeat magnesium levels in 72 hours    Hypokalemia  Assessment & Plan  Pt noted to have potassium of 3.2 in setting of poor oral intake  Continue with daily 40 mEq of potassium twice daily    Type 1 diabetes mellitus with hypoglycemia (HCC)  Assessment & Plan  Lab Results   Component Value Date    HGBA1C 8.8 (H) 11/11/2023     Recent Labs     03/10/24  1603 03/10/24  2117 03/11/24  0640 03/11/24  1026   POCGLU 145* 96 85 180*     Blood Sugar Average: Last 72 hrs:  (P) 128  noted history of brittle diabetes and follow-up with [facility] endocrinology.  Significant hypoglycemia on admission with blood sugar of 35.    Evaluated by endocrinology, while inpatient  Continue with Toujeo 12 units daily along with lispro 5 units 3 times daily AC, and sliding scale  Outpatient endocrinology follow-up recommended    Paroxysmal atrial fibrillation (HCC)  Assessment & Plan  Maintained on Eliquis.  Not on beta-blockers    History of CVA (cerebrovascular accident)  Assessment & Plan  Stabel on ASA and statin.  CT head without acute changes.    Orthostatic hypotension  Assessment & Plan  Continue midodrine and salt tablets.    Adrenal insufficiency (HCC)  Assessment & Plan  Continue Florinef 0.2 mg daily and Solu-Cortef.  Pt takes 20mg in am and 15mg at night currently on 30mg am and 40mg pm   Resumed pts home regimen in the am since he is now stable, as recommended by endocrinology per note on 3/9  On midodrine and salt tablets for orthostatic hypotension.    Continue with home regimen on  discharge    Elevated serum creatinine-resolved as of 3/11/2024  Assessment & Plan  Baseline creatinine:  0.9-1.2.    Creatinine on admission 1.47.    Recent Labs     03/09/24  0446 03/10/24  0920 03/11/24  0642   CREATININE 0.81 0.94 1.04   EGFR 91 83 73     Estimated Creatinine Clearance: 66.5 mL/min (by C-G formula based on SCr of 1.04 mg/dL).   Patient failed urinary retention protocol so has beebe catheter in place.  Will need voiding trial prior to discharge.  Removed beebe now monitor pvr     Elevated troponin level not due myocardial infarction-resolved as of 3/11/2024  Assessment & Plan  EKG with nonspecific T-wave changes.  Asymptomatic.  Suspect non-ischemic troponin elevation secondary to metabolic derangements.    * Acute metabolic encephalopathy-resolved as of 3/11/2024  Assessment & Plan  Presented with 1 day of confusion, generalized weakness, poor oral intake, cough and difficulty swallowing concerning for aspiration.  Also with reported fever and 1 episode of vomiting and diarrhea (likely gi bug as pts daughter reports 3/9 that she got it after her dad) .    Noted to be significant hypoglycemic (blood sugar of 35 once he arrived in the ED ) with slight elevation in creatinine.  All these are likely contributing to his metabolic encephalopathy, now improving .  Started on D5 normal saline and hyperglycemics held.  CT head: No acute findings.  Given concern for aspiration, CXR obtained without evidence of infectious process despite mild leukocytosis on admission and reports of fever prior to admission.    Leukocytosis has resolved.  Patient is afebrile.  Patient failed bedside speech eval initially  Speech therapy consulted sp VBS and determined pt can be started on soft regular foods, thin liquids at a slower rate  No indication for antibiotics at this time.  Consider aspiration pneumonitis.              Medical Problems       Resolved Problems  Date Reviewed: 3/11/2024            Resolved     Elevated troponin level not due myocardial infarction 3/11/2024     Resolved by  Danielle Mendez MD    * (Principal) Acute metabolic encephalopathy 3/11/2024     Resolved by  Danielle Mendez MD    Elevated serum creatinine 3/11/2024     Resolved by  Danielle Mendez MD          Admission Date:   Admission Orders (From admission, onward)       Ordered        03/07/24 1944  INPATIENT ADMISSION  Once                            Admitting Diagnosis: Weakness [R53.1]  Hypoglycemia [E16.2]  Flu-like symptoms [R68.89]  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HCC) [E10.9]  Dysphagia [R13.10]    HPI: as per, Westley Gonzalez MD , on 3/7 Karson You is a 69 y.o. male with a PMH of type I diabetes/brittle diabetes, PAF, CVA, adrenal insufficiency, orthostatic hypotension, chronic anemia, hyperlipidemia and CAD who brought by his daughter due to generalized weakness, confusion, coughing, swallowing difficulty, minimal urine output ,reported fever of 102 at home and 1 episode of vomiting and loose stool.  All symptoms started today.  Per daughter, he was in good health yesterday.  On presentation, he was noted to be afebrile with stable vital signs.  Laboratory workup is notable for significant hypoglycemia of 35, mild leukocytosis and slightly elevated creatinine.  He was given D5W and admitted to [facility] service     Procedures Performed:   Orders Placed This Encounter   Procedures    Critical Care       Summary of Hospital Course: Patient presented with acute metabolic encephalopathy secondary to hypoglycemia at presentation.  Patient was closely followed by endocrinology, insulin regimen was adjusted.  Patient's encephalopathy resolved.  Patient's workup was negative for any infection.  Patient is advised to have outpatient PCP, nephrology and endocrinology follow-up.    Discharge plan was discussed in detail with patient's daughter.  All questions were answered to satisfaction.  Patient will repeat magnesium and BMP within 1 week, discussed results with  PCP.  Patient will continue with magnesium and potassium supplements.    Significant Findings, Care, Treatment and Services Provided: see above     Complications: none    Condition at Discharge: stable         Discharge instructions/Information to patient and family:   See after visit summary for information provided to patient and family.      Provisions for Follow-Up Care:  See after visit summary for information related to follow-up care and any pertinent home health orders.      PCP: Teri Block MD    Disposition: Home with [facility]     Planned Readmission: No    Discharge Statement   I spent 36 minutes discharging the patient. This time was spent on the day of discharge. I had direct contact with the patient on the day of discharge. Additional documentation is required if more than 30 minutes were spent on discharge.     Discharge Medications:  See after visit summary for reconciled discharge medications provided to patient and family.

## 2024-03-11 NOTE — ASSESSMENT & PLAN NOTE
Recent Labs     03/09/24  0446 03/11/24  0642   MG 1.9 1.5*     Repleted with IV mag on 3/11  Continue with magnesium 400 twice daily  Repeat magnesium levels in 72 hours

## 2024-03-11 NOTE — DISCHARGE INSTR - AVS FIRST PAGE
Please see your PCP within 7-10 days of discharge to discuss recent hospitalization  No changes in your home medications have been made  Please continue to take potassium and magnesium supplements  Please repeat blood work including BMP and magnesium level within 72 hours  Continue to take soft or regular foods with thin liquids at a slower rate  Continue outpatient follow-up with nephrology and endocrinology

## 2024-03-11 NOTE — RESPIRATORY THERAPY NOTE
RT Protocol Note  Karson You 69 y.o. male MRN: [id number]  Unit/Bed#: [facility] 512-01 Encounter: [id number]    Assessment    Principal Problem:    Acute metabolic encephalopathy  Active Problems:    Type 1 diabetes mellitus with hypoglycemia (HCC)    Adrenal insufficiency (HCC)    Orthostatic hypotension    History of CVA (cerebrovascular accident)    Paroxysmal atrial fibrillation (HCC)    Hypokalemia    Hypomagnesemia    Elevated troponin level not due myocardial infarction    Elevated serum creatinine      Home Pulmonary Medications:    Home Devices/Therapy: (P) Other (Comment)    Past Medical History:   Diagnosis Date    A-fib (HCC)     Adrenal insufficiency (HCC)     Atrial fibrillation (HCC)     Diabetes mellitus (HCC)     Obesity, morbid (HCC) 11/14/2022    Stroke (HCC)      Social History     Socioeconomic History    Marital status: Single     Spouse name: None    Number of children: None    Years of education: None    Highest education level: None   Occupational History    None   Tobacco Use    Smoking status: Former     Current packs/day: 0.25     Average packs/day: 0.3 packs/day for 30.0 years (7.5 ttl pk-yrs)     Types: Cigarettes    Smokeless tobacco: Never   Vaping Use    Vaping status: Never Used   Substance and Sexual Activity    Alcohol use: Not Currently     Alcohol/week: 5.0 standard drinks of alcohol     Types: 5 Cans of beer per week     Comment: Quit in august 2022    Drug use: Never    Sexual activity: Not Currently   Other Topics Concern    None   Social History Narrative    ** Merged History Encounter **          Social Determinants of Health     Financial Resource Strain: Not on file   Food Insecurity: No Food Insecurity (3/9/2024)    Hunger Vital Sign     Worried About Running Out of Food in the Last Year: Never true     Ran Out of Food in the Last Year: Never true   Transportation Needs: No Transportation Needs (3/9/2024)    PRAPARE - Transportation     Lack of Transportation (Medical): No  "    Lack of Transportation (Non-Medical): No   Physical Activity: Not on file   Stress: Not on file   Social Connections: Not on file   Intimate Partner Violence: Not on file   Housing Stability: Low Risk  (3/9/2024)    Housing Stability Vital Sign     Unable to Pay for Housing in the Last Year: No     Number of Places Lived in the Last Year: 2     Unstable Housing in the Last Year: No       Subjective         Objective    Physical Exam:   Assessment Type: (P) Assess only  General Appearance: (P) Alert, Awake  Respiratory Pattern: (P) Normal  Chest Assessment: (P) Chest expansion symmetrical  Bilateral Breath Sounds: (P) Diminished, Clear    Vitals:  Blood pressure 145/90, pulse 69, temperature 98 °F (36.7 °C), resp. rate 16, height 5' 4\" (1.626 m), weight 81.8 kg (180 lb 4.8 oz), SpO2 96%.          Imaging and other studies:           Plan    Respiratory Plan: (P) Home Bronchodilator Patient pathway        Resp Comments: (P) Pt admitted for acute metabolic encephalopathy. Pt assess per respiratory protocol at this time. BS clear, pt is resting on RA with good SP02. Will continue to monitor per respiratory protocol.   "

## 2024-03-11 NOTE — ASSESSMENT & PLAN NOTE
Pt noted to have potassium of 3.2 in setting of poor oral intake  Continue with daily 40 mEq of potassium twice daily

## 2024-03-12 ENCOUNTER — TELEPHONE (OUTPATIENT)
Dept: LAB | Facility: HOSPITAL | Age: 69
End: 2024-03-12

## 2024-03-12 ENCOUNTER — HOME CARE VISIT (OUTPATIENT)
Dept: HOME HEALTH SERVICES | Facility: HOME HEALTHCARE | Age: 69
End: 2024-03-12

## 2024-03-12 ENCOUNTER — APPOINTMENT (EMERGENCY)
Dept: RADIOLOGY | Facility: HOSPITAL | Age: 69
End: 2024-03-12
Payer: COMMERCIAL

## 2024-03-12 ENCOUNTER — HOSPITAL ENCOUNTER (EMERGENCY)
Facility: HOSPITAL | Age: 69
Discharge: HOME | End: 2024-03-12
Attending: EMERGENCY MEDICINE | Admitting: EMERGENCY MEDICINE
Payer: COMMERCIAL

## 2024-03-12 VITALS
OXYGEN SATURATION: 98 % | RESPIRATION RATE: 20 BRPM | DIASTOLIC BLOOD PRESSURE: 78 MMHG | TEMPERATURE: 98.1 F | SYSTOLIC BLOOD PRESSURE: 154 MMHG | HEART RATE: 71 BPM

## 2024-03-12 DIAGNOSIS — R41.0 CONFUSION: Primary | ICD-10-CM

## 2024-03-12 LAB
ALBUMIN SERPL BCP-MCNC: 3.4 G/DL (ref 3.5–5)
ALP SERPL-CCNC: 68 U/L (ref 34–104)
ALT SERPL W P-5'-P-CCNC: 19 U/L (ref 7–52)
AMMONIA PLAS-SCNC: 15 UMOL/L (ref 18–72)
ANION GAP SERPL CALCULATED.3IONS-SCNC: 7 MMOL/L (ref 4–13)
AST SERPL W P-5'-P-CCNC: 18 U/L (ref 13–39)
BASOPHILS # BLD AUTO: 0.01 THOUSANDS/ÂΜL (ref 0–0.1)
BASOPHILS NFR BLD AUTO: 0 % (ref 0–1)
BILIRUB SERPL-MCNC: 0.6 MG/DL (ref 0.2–1)
BUN SERPL-MCNC: 13 MG/DL (ref 5–25)
CALCIUM ALBUM COR SERPL-MCNC: 8.5 MG/DL (ref 8.3–10.1)
CALCIUM SERPL-MCNC: 8 MG/DL (ref 8.4–10.2)
CHLORIDE SERPL-SCNC: 104 MMOL/L (ref 96–108)
CO2 SERPL-SCNC: 31 MMOL/L (ref 21–32)
CREAT SERPL-MCNC: 1.23 MG/DL (ref 0.6–1.3)
EOSINOPHIL # BLD AUTO: 0.02 THOUSAND/ÂΜL (ref 0–0.61)
EOSINOPHIL NFR BLD AUTO: 0 % (ref 0–6)
ERYTHROCYTE [DISTWIDTH] IN BLOOD BY AUTOMATED COUNT: 14 % (ref 11.6–15.1)
GFR SERPL CREATININE-BSD FRML MDRD: 60 ML/MIN/1.73SQ M
GLUCOSE SERPL-MCNC: 103 MG/DL (ref 65–140)
GLUCOSE SERPL-MCNC: 92 MG/DL (ref 65–140)
HCT VFR BLD AUTO: 35.6 % (ref 36.5–49.3)
HGB BLD-MCNC: 11.3 G/DL (ref 12–17)
HIV 1+2 AB+HIV1 P24 AG SERPL QL IA: NORMAL
HIV1 P24 AG SER QL: NORMAL
IMM GRANULOCYTES # BLD AUTO: 0.03 THOUSAND/UL (ref 0–0.2)
IMM GRANULOCYTES NFR BLD AUTO: 0 % (ref 0–2)
LYMPHOCYTES # BLD AUTO: 1 THOUSANDS/ÂΜL (ref 0.6–4.47)
LYMPHOCYTES NFR BLD AUTO: 15 % (ref 14–44)
MCH RBC QN AUTO: 31 PG (ref 26.8–34.3)
MCHC RBC AUTO-ENTMCNC: 31.7 G/DL (ref 31.4–37.4)
MCV RBC AUTO: 98 FL (ref 82–98)
MONOCYTES # BLD AUTO: 0.9 THOUSAND/ÂΜL (ref 0.17–1.22)
MONOCYTES NFR BLD AUTO: 13 % (ref 4–12)
NEUTROPHILS # BLD AUTO: 4.84 THOUSANDS/ÂΜL (ref 1.85–7.62)
NEUTS SEG NFR BLD AUTO: 72 % (ref 43–75)
NRBC BLD AUTO-RTO: 0 /100 WBCS
PLATELET # BLD AUTO: 156 THOUSANDS/UL (ref 149–390)
PMV BLD AUTO: 11.1 FL (ref 8.9–12.7)
POTASSIUM SERPL-SCNC: 3.1 MMOL/L (ref 3.5–5.3)
PROT SERPL-MCNC: 5.8 G/DL (ref 6.4–8.4)
RBC # BLD AUTO: 3.65 MILLION/UL (ref 3.88–5.62)
SODIUM SERPL-SCNC: 142 MMOL/L (ref 135–147)
TSH SERPL DL<=0.05 MIU/L-ACNC: 1.74 UIU/ML (ref 0.45–4.5)
WBC # BLD AUTO: 6.8 THOUSAND/UL (ref 4.31–10.16)

## 2024-03-12 PROCEDURE — 80053 COMPREHEN METABOLIC PANEL: CPT

## 2024-03-12 PROCEDURE — 70496 CT ANGIOGRAPHY HEAD: CPT

## 2024-03-12 PROCEDURE — 82948 REAGENT STRIP/BLOOD GLUCOSE: CPT

## 2024-03-12 PROCEDURE — 84443 ASSAY THYROID STIM HORMONE: CPT

## 2024-03-12 PROCEDURE — 99285 EMERGENCY DEPT VISIT HI MDM: CPT

## 2024-03-12 PROCEDURE — 70498 CT ANGIOGRAPHY NECK: CPT

## 2024-03-12 PROCEDURE — 87806 HIV AG W/HIV1&2 ANTB W/OPTIC: CPT

## 2024-03-12 PROCEDURE — 86803 HEPATITIS C AB TEST: CPT

## 2024-03-12 PROCEDURE — 36415 COLL VENOUS BLD VENIPUNCTURE: CPT

## 2024-03-12 PROCEDURE — 85025 COMPLETE CBC W/AUTO DIFF WBC: CPT

## 2024-03-12 PROCEDURE — 82140 ASSAY OF AMMONIA: CPT

## 2024-03-12 PROCEDURE — 87340 HEPATITIS B SURFACE AG IA: CPT

## 2024-03-12 PROCEDURE — 99285 EMERGENCY DEPT VISIT HI MDM: CPT | Performed by: EMERGENCY MEDICINE

## 2024-03-12 RX ORDER — POTASSIUM CHLORIDE 20 MEQ/1
40 TABLET, EXTENDED RELEASE ORAL ONCE
Status: COMPLETED | OUTPATIENT
Start: 2024-03-12 | End: 2024-03-12

## 2024-03-12 RX ADMIN — IOHEXOL 85 ML: 350 INJECTION, SOLUTION INTRAVENOUS at 16:58

## 2024-03-12 RX ADMIN — POTASSIUM CHLORIDE 40 MEQ: 1500 TABLET, EXTENDED RELEASE ORAL at 17:28

## 2024-03-12 NOTE — ED PROVIDER NOTES
History  Chief Complaint   Patient presents with    Altered Mental Status     Family member worried about him because of sudden AMS. Past hx of a stroke.     69-year-old male patient with a history of stroke, A-fib, on aspirin, diabetes, presenting with altered mental status onset today.  Per EMS, family was worried that patient suddenly started having altered mental status today.  History limited as patient is not able to tell me why he is in the ED.  Patient states that he has no symptoms.  Patient was recently discharged for hypoglycemia and electrolyte abnormality.  Denies any fevers, chest pain, shortness of breath, nausea, vomiting, diarrhea, urinary symptoms.        Prior to Admission Medications   Prescriptions Last Dose Informant Patient Reported? Taking?   Continuous Blood Gluc  (Dexcom G7 ) CYN   No No   Sig: Use 1 each continuous   Continuous Blood Gluc Sensor (Dexcom G7 Sensor)   No No   Sig: Use 1 Device every 10 days   Insulin Pen Needle (BD Pen Needle Josie 2nd Gen) 32G X 4 MM MISC   No No   Sig: FOR USE WITH INSULIN PEN. PHARMACY MAY DISPENSE BRAND COVERED BY INSURANCE.   Insulin Pen Needle (BD Pen Needle Josie 2nd Gen) 32G X 4 MM MISC   No No   Sig: For use with insulin pen 4 times daily. Pharmacy may dispense brand covered by insurance.   acetaminophen (TYLENOL) 325 mg tablet   No No   Sig: Take 3 tablets (975 mg total) by mouth every 8 (eight) hours   albuterol (2.5 mg/3 mL) 0.083 % nebulizer solution   No No   Sig: Take 3 mL (2.5 mg total) by nebulization every 6 (six) hours as needed for wheezing or shortness of breath   albuterol (PROVENTIL HFA,VENTOLIN HFA) 90 mcg/act inhaler   No No   Sig: Inhale 2 puffs every 4 (four) hours as needed for wheezing   apixaban (Eliquis) 5 mg   No No   Sig: Take 1 tablet (5 mg total) by mouth 2 (two) times a day   aspirin 81 mg chewable tablet   Yes No   Sig: Chew 81 mg daily   atorvastatin (LIPITOR) 10 mg tablet  Child Yes No   Sig: Take 1  tablet by mouth daily   calcitriol (ROCALTROL) 0.25 mcg capsule   No No   Sig: Take 1 capsule (0.25 mcg total) by mouth daily Do not start before November 15, 2023.   cyanocobalamin (VITAMIN B-12) 100 MCG tablet   No No   Sig: Take 1 tablet (100 mcg total) by mouth daily Do not start before November 15, 2023.   ferrous sulfate 324 (65 Fe) mg   No No   Sig: TAKE 1 TABLET (324 MG TOTAL) BY MOUTH DAILY BEFORE BREAKFAST   fludrocortisone (FLORINEF) 0.1 mg tablet   No No   Sig: Take 2 tablets (0.2 mg total) by mouth daily   hydrocortisone (CORTEF) 5 mg tablet   No No   Sig: Use 4tabs (20mg) in morning and 3 tabs (15mg) daily afternoon Do not start before December 22, 2023.   insulin glargine (Toujeo Max SoloStar) 300 units/mL CONCENTRATED U-300 injection pen (2-unit dial)   No No   Sig: Inject 12 Units under the skin daily before breakfast   insulin lispro (HumALOG/ADMELOG) 100 units/mL injection   No No   Sig: Use 5u before each meal plus 1u/60 above 150mg/dL; max dose 40u/day   magnesium Oxide (MAG-OX) 400 mg TABS   No No   Sig: Take 1 tablet (400 mg total) by mouth 2 (two) times a day   midodrine (PROAMATINE) 10 MG tablet   No No   Sig: Take 1.5 tablets (15 mg total) by mouth 3 (three) times a day before meals Hold for SBP under 140 or DBP under 90 Per Dr. Mckayla DAVILA from [facility] 1/5/24. List uploaded into [employer].   potassium chloride (Klor-Con) 10 mEq tablet   No No   Sig: Take 4 tablets (40 mEq total) by mouth 2 (two) times a day Per Dr. Mckayla DAVILA from [facility] 1/5/24. List uploaded into [employer].   risperiDONE (RisperDAL) 1 mg tablet  Child Yes No   Sig: Take 1 tablet by mouth 2 (two) times a day   sertraline (ZOLOFT) 100 mg tablet  Child Yes No   Sig: Take 100 mg by mouth daily   sodium chloride 1 g tablet   No No   Sig: Take 1 tablet (1 g total) by mouth 3 (three) times a day      Facility-Administered Medications: None       Past Medical History:   Diagnosis Date    A-fib (HCC)      Adrenal insufficiency (HCC)     Atrial fibrillation (HCC)     Diabetes mellitus (HCC)     Obesity, morbid (HCC) 11/14/2022    Stroke (HCC)        History reviewed. No pertinent surgical history.    Family History   Problem Relation Age of Onset    Heart disease Mother     Cancer Father     Multiple sclerosis Sister      I have reviewed and agree with the history as documented.    E-Cigarette/Vaping    E-Cigarette Use Never User      E-Cigarette/Vaping Substances    Nicotine No     THC No     CBD No     Flavoring No     Other No     Unknown No      Social History     Tobacco Use    Smoking status: Former     Current packs/day: 0.25     Average packs/day: 0.3 packs/day for 30.0 years (7.5 ttl pk-yrs)     Types: Cigarettes    Smokeless tobacco: Never   Vaping Use    Vaping status: Never Used   Substance Use Topics    Alcohol use: Not Currently     Alcohol/week: 5.0 standard drinks of alcohol     Types: 5 Cans of beer per week     Comment: Quit in august 2022    Drug use: Never        Review of Systems   All other systems reviewed and are negative.      Physical Exam  ED Triage Vitals [03/12/24 1520]   Temperature Pulse Respirations Blood Pressure SpO2   98.1 °F (36.7 °C) 76 19 110/54 98 %      Temp Source Heart Rate Source Patient Position - Orthostatic VS BP Location FiO2 (%)   Oral Monitor Lying Right arm --      Pain Score       No Pain             Orthostatic Vital Signs  Vitals:    03/12/24 1520 03/12/24 1645 03/12/24 1830   BP: 110/54 147/66 154/78   Pulse: 76 74 71   Patient Position - Orthostatic VS: Lying         Physical Exam  Vitals reviewed.   Constitutional:       Appearance: Normal appearance.   HENT:      Head: Normocephalic and atraumatic.      Nose: Nose normal.      Mouth/Throat:      Mouth: Mucous membranes are moist.      Pharynx: Oropharynx is clear.   Eyes:      Extraocular Movements: Extraocular movements intact.      Conjunctiva/sclera: Conjunctivae normal.   Cardiovascular:      Rate and  Rhythm: Normal rate and regular rhythm.      Pulses: Normal pulses.      Heart sounds: Normal heart sounds.   Pulmonary:      Effort: Pulmonary effort is normal.      Breath sounds: Normal breath sounds.   Abdominal:      General: Bowel sounds are normal.      Palpations: Abdomen is soft.      Tenderness: There is no abdominal tenderness.   Musculoskeletal:         General: Normal range of motion.      Cervical back: Normal range of motion.   Skin:     General: Skin is warm and dry.   Neurological:      General: No focal deficit present.      Mental Status: He is alert and oriented to person, place, and time. Mental status is at baseline. He is confused.      Cranial Nerves: No cranial nerve deficit, dysarthria or facial asymmetry.      Sensory: No sensory deficit.      Motor: No weakness.      Coordination: Coordination normal.         ED Medications  Medications   potassium chloride (Klor-Con M20) CR tablet 40 mEq (40 mEq Oral Given 3/12/24 1728)   iohexol (OMNIPAQUE) 350 MG/ML injection (SINGLE-DOSE) 85 mL (85 mL Intravenous Given 3/12/24 1658)       Diagnostic Studies  Results Reviewed       Procedure Component Value Units Date/Time    Hepatitis C antibody [[id number]]  (Normal) Collected: 03/12/24 1546    Lab Status: Final result Specimen: Blood from Arm, Left Updated: 03/13/24 0804     Hepatitis C Ab Non-reactive    Hepatitis B surface antigen [[id number]]  (Normal) Collected: 03/12/24 1546    Lab Status: Final result Specimen: Blood from Arm, Left Updated: 03/13/24 0802     Hepatitis B Surface Ag Non-reactive    Rapid HIV 1/2 AB-AG Combo for 12 yr old and above [[id number]]  (Normal) Collected: 03/12/24 1546    Lab Status: Final result Specimen: Blood from Arm, Left Updated: 03/12/24 1638     Rapid HIV 1 AND 2 Non-Reactive     HIV-1 P24 Ag Screen Non-Reactive    Narrative:      Negative for HIV-1 p24 Antigen.  Negative for HIV-1 and/or HIV-2 Antibody.    TSH, 3rd generation with Free T4 reflex [[id number]]   (Normal) Collected: 03/12/24 1546    Lab Status: Final result Specimen: Blood from Arm, Left Updated: 03/12/24 1629     TSH 3RD GENERATON 1.742 uIU/mL     Ammonia [[id number]]  (Abnormal) Collected: 03/12/24 1546    Lab Status: Final result Specimen: Blood from Arm, Left Updated: 03/12/24 1617     Ammonia 15 umol/L     Comprehensive metabolic panel [[id number]]  (Abnormal) Collected: 03/12/24 1546    Lab Status: Final result Specimen: Blood from Arm, Left Updated: 03/12/24 1617     Sodium 142 mmol/L      Potassium 3.1 mmol/L      Chloride 104 mmol/L      CO2 31 mmol/L      ANION GAP 7 mmol/L      BUN 13 mg/dL      Creatinine 1.23 mg/dL      Glucose 92 mg/dL      Calcium 8.0 mg/dL      Corrected Calcium 8.5 mg/dL      AST 18 U/L      ALT 19 U/L      Alkaline Phosphatase 68 U/L      Total Protein 5.8 g/dL      Albumin 3.4 g/dL      Total Bilirubin 0.60 mg/dL      eGFR 60 ml/min/1.73sq m     Narrative:      National Kidney Disease Foundation guidelines for Chronic Kidney Disease (CKD):     Stage 1 with normal or high GFR (GFR > 90 mL/min/1.73 square meters)    Stage 2 Mild CKD (GFR = 60-89 mL/min/1.73 square meters)    Stage 3A Moderate CKD (GFR = 45-59 mL/min/1.73 square meters)    Stage 3B Moderate CKD (GFR = 30-44 mL/min/1.73 square meters)    Stage 4 Severe CKD (GFR = 15-29 mL/min/1.73 square meters)    Stage 5 End Stage CKD (GFR <15 mL/min/1.73 square meters)  Note: GFR calculation is accurate only with a steady state creatinine    CBC and differential [[id number]]  (Abnormal) Collected: 03/12/24 1546    Lab Status: Final result Specimen: Blood from Arm, Left Updated: 03/12/24 1556     WBC 6.80 Thousand/uL      RBC 3.65 Million/uL      Hemoglobin 11.3 g/dL      Hematocrit 35.6 %      MCV 98 fL      MCH 31.0 pg      MCHC 31.7 g/dL      RDW 14.0 %      MPV 11.1 fL      Platelets 156 Thousands/uL      nRBC 0 /100 WBCs      Neutrophils Relative 72 %      Immature Grans % 0 %      Lymphocytes Relative 15 %      Monocytes  Relative 13 %      Eosinophils Relative 0 %      Basophils Relative 0 %      Neutrophils Absolute 4.84 Thousands/µL      Absolute Immature Grans 0.03 Thousand/uL      Absolute Lymphocytes 1.00 Thousands/µL      Absolute Monocytes 0.90 Thousand/µL      Eosinophils Absolute 0.02 Thousand/µL      Basophils Absolute 0.01 Thousands/µL     Fingerstick Glucose (POCT) [[id number]]  (Normal) Collected: 03/12/24 1525    Lab Status: Final result Specimen: Blood Updated: 03/12/24 1527     POC Glucose 103 mg/dl                    CTA head and neck with and without contrast   Final Result by Marcos Mcgowan MD (03/12 1743)      No significant interval change. No mass effect, acute intracranial hemorrhage or evidence of recent infarction.      No evidence for major branch vessel occlusion of the anterior circulation. Stable multifocal areas of atherosclerotic irregularity and luminal narrowing as described above.      Stable atherosclerotic plaque at the carotid bulbs bilaterally. No evidence for high-grade stenosis or focal occlusion of the cervical vasculature.            Workstation performed: [id number]               Procedures  Procedures      ED Course  ED Course as of 03/13/24 1411   Tue Mar 12, 2024   1538 Attempted to reach out to family, did not answer   1929 Spoke with daughter, is worried that patient has not been doing well with repeateed epsiodes. Spoke with neurotal patient was supposed to follow up with eeg, but has not. Patients daughter insistent on talking to neuro. Spoke with josé miguel and agreed to talk to patient's family.    2015 Neuro spoke with caroline, stressed importance of follow up for 48 hour eeg. Daughter in agreement. Patient stable to be discharged                                       Medical Decision Making  69-year-old male patient presenting with altered mental status.  Patient has had multiple episodes of this in the past and was recently admitted for hypoglycemia.  Patient's blood sugar within  normal range today.  DDx includes intracranial process, electrolyte abnormalities, hypoglycemia, seizures.  Patient at baseline when I examined patient.  Labs were within normal limits except some mild hypokalemia which was treated with potassium.  CT head and neck shows no acute changes.  Spoke with daughter at length, states that patient had an unresponsive episode today.  Possible that patient had seizures.  Patient had 48-hour EEG ordered in March 2023 however never followed up and patient's daughter claims that they are too busy to follow-up.  Spoke with neurology, states patient can be discharged as he has no acute findings and these episodes have been reoccurring.  Asked neurology to speak with patient's daughter and neurology spoke to patient's daughter and came into agreement that patient needs 48-hour EEG.  Patient remained stable throughout without any complaints.  Safe for discharge and follow-up with PCP and neurology.  Return precautions given.  Urged daughter to follow-up with 48-hour EEG.    Amount and/or Complexity of Data Reviewed  Labs: ordered.  Radiology: ordered.    Risk  Prescription drug management.          Disposition  Final diagnoses:   Confusion     Time reflects when diagnosis was documented in both MDM as applicable and the Disposition within this note       Time User Action Codes Description Comment    3/12/2024  8:05 PM Ryland Coronado Add [R41.0] Confusion           ED Disposition       ED Disposition   Discharge    Condition   Stable    Date/Time   Tue Mar 12, 2024  8:05 PM    Comment   Karson You discharge to home/self care.                   Follow-up Information       Follow up With Specialties Details Why Contact Info Additional Information    Teri Block MD Internal Medicine Schedule an appointment as soon as possible for a visit   [address] [place] [id number]  [phone]       [facility] Neurosurgical [facility] Neurosurgery Schedule  an appointment as soon as possible for a visit   [address]   [address]  [facility] [address] [phone] [facility], [address] [facility] [address], [place], [place], [address] [phone]            Discharge Medication List as of 3/12/2024  8:19 PM        CONTINUE these medications which have NOT CHANGED    Details   acetaminophen (TYLENOL) 325 mg tablet Take 3 tablets (975 mg total) by mouth every 8 (eight) hours, Starting Thu 12/21/2023, No Print      albuterol (2.5 mg/3 mL) 0.083 % nebulizer solution Take 3 mL (2.5 mg total) by nebulization every 6 (six) hours as needed for wheezing or shortness of breath, Starting Thu 12/21/2023, No Print      albuterol (PROVENTIL HFA,VENTOLIN HFA) 90 mcg/act inhaler Inhale 2 puffs every 4 (four) hours as needed for wheezing, Starting Thu 12/21/2023, No Print      apixaban (Eliquis) 5 mg Take 1 tablet (5 mg total) by mouth 2 (two) times a day, Starting Wed 3/6/2024, Normal      aspirin 81 mg chewable tablet Chew 81 mg daily, Historical Med      atorvastatin (LIPITOR) 10 mg tablet Take 1 tablet by mouth daily, Starting Tue 7/12/2022, Historical Med      calcitriol (ROCALTROL) 0.25 mcg capsule Take 1 capsule (0.25 mcg total) by mouth daily Do not start before November 15, 2023., Starting Wed 11/15/2023, Normal      Continuous Blood Gluc  (Dexcom G7 ) CYN Use 1 each continuous, Starting Mon 10/23/2023, Normal      Continuous Blood Gluc Sensor (Dexcom G7 Sensor) Use 1 Device every 10 days, Starting Tue 3/5/2024, Normal      cyanocobalamin (VITAMIN B-12) 100 MCG tablet Take 1 tablet (100 mcg total) by mouth daily Do not start before November 15, 2023., Starting Wed 11/15/2023, Normal      ferrous sulfate 324 (65 Fe) mg TAKE 1 TABLET (324 MG TOTAL) BY MOUTH DAILY BEFORE BREAKFAST, Starting Mon 6/26/2023, Normal      fludrocortisone (FLORINEF) 0.1 mg tablet Take 2 tablets (0.2 mg total) by mouth daily, Starting  Fri 3/8/2024, Normal      hydrocortisone (CORTEF) 5 mg tablet Use 4tabs (20mg) in morning and 3 tabs (15mg) daily afternoon Do not start before December 22, 2023., No Print      insulin glargine (Toujeo Max SoloStar) 300 units/mL CONCENTRATED U-300 injection pen (2-unit dial) Inject 12 Units under the skin daily before breakfast, Starting Mon 3/11/2024, Normal      insulin lispro (HumALOG/ADMELOG) 100 units/mL injection Use 5u before each meal plus 1u/60 above 150mg/dL; max dose 40u/day, Normal      !! Insulin Pen Needle (BD Pen Needle Josie 2nd Gen) 32G X 4 MM MISC FOR USE WITH INSULIN PEN. PHARMACY MAY DISPENSE BRAND COVERED BY INSURANCE., Normal      !! Insulin Pen Needle (BD Pen Needle Josie 2nd Gen) 32G X 4 MM MISC For use with insulin pen 4 times daily. Pharmacy may dispense brand covered by insurance., Normal      magnesium Oxide (MAG-OX) 400 mg TABS Take 1 tablet (400 mg total) by mouth 2 (two) times a day, Starting Mon 3/11/2024, Normal      midodrine (PROAMATINE) 10 MG tablet Take 1.5 tablets (15 mg total) by mouth 3 (three) times a day before meals Hold for SBP under 140 or DBP under 90 Per Dr. Mckayla DAVILA from [facility] 1/5/24. List uploaded into [employer]., Starting Mon 3/11/2024, Until Thu 3/6/2025, Normal      potassium chloride (Klor-Con) 10 mEq tablet Take 4 tablets (40 mEq total) by mouth 2 (two) times a day Per Dr. Mckayla DAVILA from [facility] 1/5/24. List uploaded into [employer]., Starting Mon 3/11/2024, Normal      risperiDONE (RisperDAL) 1 mg tablet Take 1 tablet by mouth 2 (two) times a day, Historical Med      sertraline (ZOLOFT) 100 mg tablet Take 100 mg by mouth daily, Starting Wed 3/16/2022, Until Tue 1/30/2024, Historical Med      sodium chloride 1 g tablet Take 1 tablet (1 g total) by mouth 3 (three) times a day, Starting Tue 11/14/2023, Normal       !! - Potential duplicate medications found. Please discuss with provider.        No discharge procedures on file.    PDMP  Review         Value Time User    PDMP Reviewed  Yes 3/11/2024 10:52 AM Danielle Mendez MD             ED Provider  Attending physically available and evaluated Karson You. I managed the patient along with the ED Attending.    Electronically Signed by           Ryland Coronado MD  03/13/24 9733

## 2024-03-12 NOTE — ED ATTENDING ATTESTATION
"I, Clemente Chacko MD, saw and evaluated the patient. I have discussed the patient with the resident and agree with the resident's findings, Plan of Care, and MDM as documented in the resident's note, except where noted. All available labs and Radiology studies were reviewed.  I was present for key portions of any procedure(s) performed by the resident and I was immediately available to provide assistance.    At this point I agree with the current assessment done in the Emergency Department.  I have conducted an independent evaluation of this patient a history and physical is as follows:    68 yo obese male with a history of atrial fibrillation on Eliquis, DM, adrenal insufficiency, and prior CVA brought to the ED by EMS for evaluation of an altered mental status. Family members reportedly told medics that he \"wasn't acting right all day\". The patient was just discharged from the hospital yesterday after an admission for recurrent hypoglycemia. He has absolutely no complaints and says he feels \"fine\". He denies chest pain, shortness of breath, nausea, vomiting, and diaphoresis. No numbness or weakness. He claims his blood glucose has been \"ok\" since returning home. No dizziness or lightheadedness. No fevers or chills.    ROS: per resident physician note    Gen: NAD, AA&Ox3  HEENT: PERRL, EOMI  Neck: supple  CV: RRR  Lungs: CTA B/L  Abdomen: soft, NT/ND  Ext: no swelling or deformity  Neuro: 5/5 strength all extremities, sensation grossly intact  Psych: (+) bizarre affect  Skin: no rash    ED Course  The patient is comfortable appearing with stable vital signs and a nonfocal neurologic examination. He has absolutely no complaints and does not know why he was brought to the ED. Plan to reach out to family for additional history. Will check basic labs, TSH, blood glucose, and ammonia. Will continue to monitor in the ED. Disposition per workup and reassessment.      Critical Care Time  Procedures   "

## 2024-03-13 ENCOUNTER — HOME CARE VISIT (OUTPATIENT)
Dept: HOME HEALTH SERVICES | Facility: HOME HEALTHCARE | Age: 69
End: 2024-03-13
Payer: COMMERCIAL

## 2024-03-13 VITALS
DIASTOLIC BLOOD PRESSURE: 64 MMHG | OXYGEN SATURATION: 99 % | TEMPERATURE: 96.1 F | SYSTOLIC BLOOD PRESSURE: 142 MMHG | HEART RATE: 74 BPM | RESPIRATION RATE: 14 BRPM

## 2024-03-13 LAB
BACTERIA BLD CULT: NORMAL
BACTERIA BLD CULT: NORMAL
HBV SURFACE AG SER QL: NORMAL
HCV AB SER QL: NORMAL

## 2024-03-13 PROCEDURE — 400013 VN SOC

## 2024-03-13 PROCEDURE — G0299 HHS/HOSPICE OF RN EA 15 MIN: HCPCS

## 2024-03-13 NOTE — DISCHARGE INSTRUCTIONS
You were seen in the ED for confusion. Return to the ED for any worsening symptoms or new symptoms. Follow up with your primary care doctor as soon as possible.  Make sure to follow up with the 48 hour EEG as recommended by the neurologist.

## 2024-03-13 NOTE — CASE COMMUNICATION
Ofelia Raymond Daughter is PSR [email or web address] [phone] please send Admission Pack info

## 2024-03-13 NOTE — CASE COMMUNICATION
[facility] your patient to Home Health service with the following disciplines:      SN, PT and OT  This report is informational only, no response is needed  Primary focus of home health care: Endocrine  Patient stated goals of care: Improved blood sugars  Anticipated visit pattern: 2w2 and next visit date: 3/15/24 DM  See medication list - meds in home differ from AVS: Patient is missing magnesium, risperidone, and iron .  Daughter reports needs refill of risperidone, iron, and already has script for magnesium. Patient reports no pain and has no need for tylenol around the clock. Daughter refuses to change insulin orders until done by patients endocrinologist. Patient is using 9 units before meals with sliding scale of 1 u 150-200, 2 unit 201-250, 3 unit 251-300, 4 units 301-350, 5 u 351-400, 6 u 401-450. And 18 units of Toujeo in PM.   Significant cli nical findings: Moist nonproductive cough for months per patients sister. Educated on pulmonary toilet. May benefit from mucinex.   Potential barriers to goal achievement: noncompliance with [facility] orders.    Thank you for allowing us to participate in the care of your patient.      Ryne Felder RN

## 2024-03-14 ENCOUNTER — APPOINTMENT (OUTPATIENT)
Dept: LAB | Facility: HOSPITAL | Age: 69
End: 2024-03-14
Attending: INTERNAL MEDICINE
Payer: COMMERCIAL

## 2024-03-14 DIAGNOSIS — D50.9 IRON DEFICIENCY ANEMIA, UNSPECIFIED IRON DEFICIENCY ANEMIA TYPE: ICD-10-CM

## 2024-03-14 DIAGNOSIS — E10.649 TYPE 1 DIABETES MELLITUS WITH HYPOGLYCEMIA AND WITHOUT COMA (HCC): ICD-10-CM

## 2024-03-14 DIAGNOSIS — D50.9 IRON DEFICIENCY ANEMIA, UNSPECIFIED: ICD-10-CM

## 2024-03-14 DIAGNOSIS — E16.2 HYPOGLYCEMIA: ICD-10-CM

## 2024-03-14 DIAGNOSIS — I95.1 ORTHOSTATIC HYPOTENSION: ICD-10-CM

## 2024-03-14 DIAGNOSIS — E55.9 VITAMIN D DEFICIENCY: ICD-10-CM

## 2024-03-14 DIAGNOSIS — D63.8 ANEMIA OF CHRONIC DISEASE: ICD-10-CM

## 2024-03-14 LAB
25(OH)D3 SERPL-MCNC: 14.6 NG/ML (ref 30–100)
ANION GAP SERPL CALCULATED.3IONS-SCNC: 12 MMOL/L (ref 4–13)
BUN SERPL-MCNC: 16 MG/DL (ref 5–25)
CALCIUM SERPL-MCNC: 8.4 MG/DL (ref 8.4–10.2)
CHLORIDE SERPL-SCNC: 104 MMOL/L (ref 96–108)
CO2 SERPL-SCNC: 26 MMOL/L (ref 21–32)
CREAT SERPL-MCNC: 1.24 MG/DL (ref 0.6–1.3)
CREAT UR-MCNC: 85.1 MG/DL
ERYTHROCYTE [DISTWIDTH] IN BLOOD BY AUTOMATED COUNT: 14.1 % (ref 11.6–15.1)
EST. AVERAGE GLUCOSE BLD GHB EST-MCNC: 160 MG/DL
FERRITIN SERPL-MCNC: 49 NG/ML (ref 24–336)
GFR SERPL CREATININE-BSD FRML MDRD: 59 ML/MIN/1.73SQ M
GLUCOSE SERPL-MCNC: 182 MG/DL (ref 65–140)
HBA1C MFR BLD: 7.2 %
HCT VFR BLD AUTO: 38.4 % (ref 36.5–49.3)
HGB BLD-MCNC: 12.1 G/DL (ref 12–17)
IRON SATN MFR SERPL: 12 % (ref 15–50)
IRON SERPL-MCNC: 34 UG/DL (ref 50–212)
MAGNESIUM SERPL-MCNC: 1.7 MG/DL (ref 1.9–2.7)
MCH RBC QN AUTO: 31.6 PG (ref 26.8–34.3)
MCHC RBC AUTO-ENTMCNC: 31.5 G/DL (ref 31.4–37.4)
MCV RBC AUTO: 100 FL (ref 82–98)
MICROALBUMIN UR-MCNC: 9.1 MG/L
MICROALBUMIN/CREAT 24H UR: 11 MG/G CREATININE (ref 0–30)
PLATELET # BLD AUTO: 198 THOUSANDS/UL (ref 149–390)
PMV BLD AUTO: 11.8 FL (ref 8.9–12.7)
POTASSIUM SERPL-SCNC: 3.5 MMOL/L (ref 3.5–5.3)
RBC # BLD AUTO: 3.83 MILLION/UL (ref 3.88–5.62)
SODIUM SERPL-SCNC: 142 MMOL/L (ref 135–147)
TIBC SERPL-MCNC: 293 UG/DL (ref 250–450)
UIBC SERPL-MCNC: 259 UG/DL (ref 155–355)
WBC # BLD AUTO: 12.82 THOUSAND/UL (ref 4.31–10.16)

## 2024-03-14 PROCEDURE — 80048 BASIC METABOLIC PNL TOTAL CA: CPT

## 2024-03-14 PROCEDURE — 83735 ASSAY OF MAGNESIUM: CPT

## 2024-03-14 PROCEDURE — 82570 ASSAY OF URINE CREATININE: CPT

## 2024-03-14 PROCEDURE — 82306 VITAMIN D 25 HYDROXY: CPT

## 2024-03-14 PROCEDURE — 83540 ASSAY OF IRON: CPT

## 2024-03-14 PROCEDURE — 83550 IRON BINDING TEST: CPT

## 2024-03-14 PROCEDURE — 85027 COMPLETE CBC AUTOMATED: CPT

## 2024-03-14 PROCEDURE — 82728 ASSAY OF FERRITIN: CPT

## 2024-03-14 PROCEDURE — 83036 HEMOGLOBIN GLYCOSYLATED A1C: CPT

## 2024-03-14 PROCEDURE — 82043 UR ALBUMIN QUANTITATIVE: CPT

## 2024-03-14 PROCEDURE — 36415 COLL VENOUS BLD VENIPUNCTURE: CPT

## 2024-03-15 ENCOUNTER — HOME CARE VISIT (OUTPATIENT)
Dept: HOME HEALTH SERVICES | Facility: HOME HEALTHCARE | Age: 69
End: 2024-03-15
Payer: COMMERCIAL

## 2024-03-15 VITALS — SYSTOLIC BLOOD PRESSURE: 159 MMHG | RESPIRATION RATE: 18 BRPM | DIASTOLIC BLOOD PRESSURE: 83 MMHG | HEART RATE: 73 BPM

## 2024-03-15 PROCEDURE — G0299 HHS/HOSPICE OF RN EA 15 MIN: HCPCS

## 2024-03-18 ENCOUNTER — HOME CARE VISIT (OUTPATIENT)
Dept: HOME HEALTH SERVICES | Facility: HOME HEALTHCARE | Age: 69
End: 2024-03-18
Payer: COMMERCIAL

## 2024-03-18 VITALS
HEART RATE: 63 BPM | DIASTOLIC BLOOD PRESSURE: 68 MMHG | BODY MASS INDEX: 29.71 KG/M2 | OXYGEN SATURATION: 96 % | WEIGHT: 174 LBS | TEMPERATURE: 96.4 F | HEIGHT: 64 IN | SYSTOLIC BLOOD PRESSURE: 120 MMHG

## 2024-03-18 VITALS
TEMPERATURE: 97.9 F | DIASTOLIC BLOOD PRESSURE: 70 MMHG | OXYGEN SATURATION: 98 % | HEART RATE: 88 BPM | SYSTOLIC BLOOD PRESSURE: 128 MMHG | RESPIRATION RATE: 16 BRPM

## 2024-03-18 PROCEDURE — G0299 HHS/HOSPICE OF RN EA 15 MIN: HCPCS

## 2024-03-18 PROCEDURE — G0151 HHCP-SERV OF PT,EA 15 MIN: HCPCS

## 2024-03-19 ENCOUNTER — TELEPHONE (OUTPATIENT)
Dept: NEPHROLOGY | Facility: CLINIC | Age: 69
End: 2024-03-19

## 2024-03-19 DIAGNOSIS — E87.6 HYPOKALEMIA: ICD-10-CM

## 2024-03-19 DIAGNOSIS — D63.8 ANEMIA OF CHRONIC DISEASE: Primary | ICD-10-CM

## 2024-03-19 DIAGNOSIS — E83.42 HYPOMAGNESEMIA: ICD-10-CM

## 2024-03-19 NOTE — TELEPHONE ENCOUNTER
Creatinine 1.2 closer to baseline.  Potassium level is improved within normal range 3.5.  Magnesium slowly improving 1.7 although still slightly lower.  Iron saturation slowly improving 12%, hemoglobin improving.  Vitamin D level still remains low at 14.6 although noted he was recommended by endocrine to take vitamin D 10,000 units daily.    Continue current magnesium oxide 1 tablet twice daily, continue potassium supplements.    He should have repeat serum magnesium, potassium level in 1 month.

## 2024-03-20 ENCOUNTER — HOME CARE VISIT (OUTPATIENT)
Dept: HOME HEALTH SERVICES | Facility: HOME HEALTHCARE | Age: 69
End: 2024-03-20
Payer: COMMERCIAL

## 2024-03-21 ENCOUNTER — HOME CARE VISIT (OUTPATIENT)
Dept: HOME HEALTH SERVICES | Facility: HOME HEALTHCARE | Age: 69
End: 2024-03-21
Payer: COMMERCIAL

## 2024-03-21 PROCEDURE — G0299 HHS/HOSPICE OF RN EA 15 MIN: HCPCS

## 2024-03-26 ENCOUNTER — TELEPHONE (OUTPATIENT)
Dept: ENDOCRINOLOGY | Facility: CLINIC | Age: 69
End: 2024-03-26

## 2024-03-28 ENCOUNTER — PREP FOR PROCEDURE (OUTPATIENT)
Dept: GASTROENTEROLOGY | Facility: MEDICAL CENTER | Age: 69
End: 2024-03-28

## 2024-03-28 DIAGNOSIS — Z12.11 SCREEN FOR COLON CANCER: Primary | ICD-10-CM

## 2024-03-28 NOTE — TELEPHONE ENCOUNTER
Called patient through daughters phone again and LM on VM for her to call back and reschedule his colonoscopy . Letter is sent.

## 2024-04-29 ENCOUNTER — TELEPHONE (OUTPATIENT)
Dept: NEPHROLOGY | Facility: CLINIC | Age: 69
End: 2024-04-29

## 2024-04-29 ENCOUNTER — TELEPHONE (OUTPATIENT)
Age: 69
End: 2024-04-29

## 2024-04-29 RX ORDER — INSULIN LISPRO 100 [IU]/ML
INJECTION, SOLUTION INTRAVENOUS; SUBCUTANEOUS
Qty: 30 ML | Refills: 0 | Status: SHIPPED | OUTPATIENT
Start: 2024-04-29

## 2024-04-29 NOTE — TELEPHONE ENCOUNTER
Called pt and spoke with Daughter. Appt was scheduled on 6/27/24 in the [facility] at 3:30pm (recall) overdue follow up.

## 2024-04-30 ENCOUNTER — TELEMEDICINE (OUTPATIENT)
Dept: ENDOCRINOLOGY | Facility: CLINIC | Age: 69
End: 2024-04-30
Payer: COMMERCIAL

## 2024-04-30 ENCOUNTER — TELEPHONE (OUTPATIENT)
Dept: ENDOCRINOLOGY | Facility: CLINIC | Age: 69
End: 2024-04-30

## 2024-04-30 DIAGNOSIS — E27.40 ADRENAL INSUFFICIENCY (HCC): Primary | ICD-10-CM

## 2024-04-30 DIAGNOSIS — E10.649 TYPE 1 DIABETES MELLITUS WITH HYPOGLYCEMIA AND WITHOUT COMA (HCC): ICD-10-CM

## 2024-04-30 DIAGNOSIS — I50.32 CHRONIC DIASTOLIC HEART FAILURE (HCC): ICD-10-CM

## 2024-04-30 DIAGNOSIS — E10.9 BRITTLE DIABETES MELLITUS (HCC): ICD-10-CM

## 2024-04-30 DIAGNOSIS — E27.1 PRIMARY ADRENOCORTICAL INSUFFICIENCY (HCC): ICD-10-CM

## 2024-04-30 DIAGNOSIS — E10.43 TYPE 1 DIABETES MELLITUS WITH DIABETIC AUTONOMIC (POLY)NEUROPATHY (HCC): ICD-10-CM

## 2024-04-30 DIAGNOSIS — E55.9 VITAMIN D DEFICIENCY: ICD-10-CM

## 2024-04-30 DIAGNOSIS — E66.01 MORBID (SEVERE) OBESITY DUE TO EXCESS CALORIES (HCC): ICD-10-CM

## 2024-04-30 PROCEDURE — 1159F MED LIST DOCD IN RCRD: CPT | Performed by: INTERNAL MEDICINE

## 2024-04-30 PROCEDURE — 99214 OFFICE O/P EST MOD 30 MIN: CPT | Performed by: INTERNAL MEDICINE

## 2024-04-30 PROCEDURE — 1160F RVW MEDS BY RX/DR IN RCRD: CPT | Performed by: INTERNAL MEDICINE

## 2024-04-30 PROCEDURE — 95251 CONT GLUC MNTR ANALYSIS I&R: CPT | Performed by: INTERNAL MEDICINE

## 2024-04-30 NOTE — ASSESSMENT & PLAN NOTE
He is uncontrolled with recent hospitalization and rehabilitation. We reviewed his cgm data and agreed to use following settings:  Humalog Insulin    9u    9u    9u     Regular, Apidra, Humalog orNovolog Sliding Scale:   <80                      151-200 +1  +1 +1     201-250 +2 +2 +2 +   251-320 +3 +3 +3 +   321-400 +4 +4 +4 +   >400 +5 +5 +5 +         Toujeo Insulin  18u        Dexcom data reviewed.  Encouraged to avoid dietary indiscretion.  Follow up in 3 months.      Lab Results   Component Value Date    HGBA1C 7.2 (H) 03/14/2024

## 2024-04-30 NOTE — PROGRESS NOTES
REQUIRED DOCUMENTATION:      1. This service was provided via Telemedicine -[facility]  2. Provider located at [place], [place].  3. TeleMed provider: Lewis Butts MD.  4. Identify all parties in room with patient during tele consult:  5.Patient was then informed that this was a Telemedicine visit and that the exam was being conducted confidentially over secure lines. My office door was closed. No one else was in the room.  Patient acknowledged consent and understanding of privacy and security of the Telemedicine visit, and gave us permission to have the assistant stay in the room in order to assist with the history and to conduct the exam.  I informed the patient that I have reviewed their record in Epic and presented the opportunity for them to ask any questions regarding the visit today.  The patient agreed to participate.     Patient is aware this is a billable service.       Follow-up Patient Progress Note      CC: type 1 diabetes     History of Present Illness:   67 yr male with Type 1 diabetes since age 39yr with brittle diabetes, hypertension, obesity, hyperlipidemia, atrial fibrillation, orthostasis, anxiety/depression, anemia, adrenal insufficiency and coronary artery disease.  Last visit was 1/30/2024.     He was just admitted to the hospital 2 days ago for significant hypoglycemia.  He was discharged with slightly increased dose of hydrocortisone and readjustment of basal bolus insulin therapy.        CGM data review::  Device: dexcom          Dates:  4/13/24-4/26/24             Usage: 93 %   Av glu: 212 mg/dL                   SD: mg/dL      CV:  25.4 %            GMI: 8.4  %  TIR: 31 %                    TAR: 43+26 %             TBR: 0 %     Glycemic patters:  predominantly hyperglycemia fasting and postprandial.  Hypoglycemia: No     Current meds:  Lantus 18 units q.h.s.  Humalog KwikPen 8-8-8 units with each meal  Hydrocortisone 20 mg am and 15 mg pm dose/ Fludrocortisone 0.2 mg daily    "  Opthamology: yes  Podiatry: no  vaccination: yes  Dental:  Pancreatitis: no     Ace/ARB: no  Statin:  Lipitor  Thyroid issues:  No     Physical Exam:  There is no height or weight on file to calculate BMI.  There were no vitals taken for this visit.   There were no vitals filed for this visit.     Patient unable to participate. Telemedicine was with daughter who is with patient and caretaker.    Labs:   Lab Results   Component Value Date    HGBA1C 7.2 (H) 03/14/2024       Lab Results   Component Value Date    VHF4GBPKPSIM 1.742 03/12/2024    TSH 0.50 08/17/2022       Lab Results   Component Value Date    CREATININE 1.24 03/14/2024    CREATININE 1.23 03/12/2024    CREATININE 1.04 03/11/2024    BUN 16 03/14/2024    K 3.5 03/14/2024     03/14/2024    CO2 26 03/14/2024     GFR, Calculated   Date Value Ref Range Status   12/01/2020 56 (L) >60 mL/min/1.73m2 Final     Comment:     mL/min per 1.73 square meters                                            Normal Function or Mild Renal    Disease (if clinically at risk):  >or=60  Moderately Decreased:                30-59  Severely Decreased:                  15-29  Renal Failure:                         <15                                            -American GFR: multiply reported GFR by 1.16    Please note that the eGFR is based on the CKD-EPI calculation, and is not intended to be used for drug dosing.                                            Note: Calculated GFR may not be an accurate indicator of renal function if the patient's renal function is not in a steady state.     eGFRcr   Date Value Ref Range Status   01/05/2024 77 >59 Final     eGFR   Date Value Ref Range Status   03/14/2024 59 ml/min/1.73sq m Final       Lab Results   Component Value Date    ALT 19 03/12/2024    AST 18 03/12/2024    ALKPHOS 68 03/12/2024       No results found for: \"CHOLESTEROL\"  No results found for: \"HDL\"  No results found for: \"TRIG\"  No results found for: " "\"NONHDLC\"      Assessment/Plan:    1. Adrenal insufficiency (HCC)  Assessment & Plan:  He is stable on Hydrocortisone 20mg Am and 15mg pm plus fludrocorisone 0.1mg daily.    Double dose for sickness/stress.    Follow up in 6 months.      2. Type 1 diabetes mellitus with hypoglycemia and without coma (HCC)  Assessment & Plan:  He is uncontrolled with recent hospitalization and rehabilitation. We reviewed his cgm data and agreed to use following settings:  Humalog Insulin    9u    9u    9u     Regular, Apidra, Humalog orNovolog Sliding Scale:   <80                      151-200 +1  +1 +1     201-250 +2 +2 +2 +   251-320 +3 +3 +3 +   321-400 +4 +4 +4 +   >400 +5 +5 +5 +         Toujeo Insulin  18u        Dexcom data reviewed.  Encouraged to avoid dietary indiscretion.  Follow up in 3 months.      Lab Results   Component Value Date    HGBA1C 7.2 (H) 03/14/2024       Orders:  -     Hemoglobin A1C; Future    3. 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HCC)    4. Vitamin D deficiency    5. Primary adrenocortical insufficiency (HCC)    6. Morbid (severe) obesity due to excess calories (HCC)    7. Chronic diastolic heart failure (HCC)    8. Type 1 diabetes mellitus with diabetic autonomic (poly)neuropathy (HCC)  Assessment & Plan:  He is uncontrolled with recent hospitalization and rehabilitation. We reviewed his cgm data and agreed to use following settings:  Humalog Insulin    9u    9u    9u     Regular, Apidra, Humalog orNovolog Sliding Scale:   <80                      151-200 +1  +1 +1     201-250 +2 +2 +2 +   251-320 +3 +3 +3 +   321-400 +4 +4 +4 +   >400 +5 +5 +5 +         Toujeo Insulin  18u        Dexcom data reviewed.  Encouraged to avoid dietary indiscretion.  Follow up in 3 months.      Lab Results   Component Value Date    HGBA1C 7.2 (H) 03/14/2024             Humalog Insulin    9u    9u    9u     Regular, Apidra, Humalog orNovolog Sliding Scale:   <80                      151-200 +1  +1 +1     201-250 +2 +2 " +2 +   251-320 +3 +3 +3 +   321-400 +4 +4 +4 +   >400 +5 +5 +5 +         Toujeo Insulin  18u          I have spent a total time of 30 minutes on 04/30/24 in caring for this patient including greater than 50% of this time was spent in counseling/coordination of care as listed above.       Discussed with the patient and all questioned fully answered. He will contact me with concerns.    Lewis Butts

## 2024-04-30 NOTE — TELEPHONE ENCOUNTER
Called and spoke with patient's daughter to schedule 3 mo f/u from last OV. Daughter will call back to schedule appointment.

## 2024-04-30 NOTE — ASSESSMENT & PLAN NOTE
He is stable on Hydrocortisone 20mg Am and 15mg pm plus fludrocorisone 0.1mg daily.    Double dose for sickness/stress.    Follow up in 6 months.

## 2024-05-11 NOTE — PROGRESS NOTES
EMERGENCY DEPARTMENT ENCOUNTER      Pt Name: Scottie Butler  MRN: [id number]  Birthdate 1956  Date of evaluation: 5/11/2024  Provider: Sha House DO    CHIEF COMPLAINT       Chief Complaint   Patient presents with    Hand Injury     Pt was carrying a cabinet when it smashed right 4th finger and wrist. Pulses intact, pt unable to bend 4th finger         HISTORY OF PRESENT ILLNESS    Patient is a 67-year-old male present with chief complaint of right hand and right ring finger injury.  Patient was moving a cabinet earlier today when cabinet fell and ended up falling onto the hand.  He states that his ring finger was injured.  Has been unable to flex at the PIP since.  Is also having swelling over the posterior aspect of the right hand near the medial aspect.  No other injuries reported.  Denies any previous injuries to the hand.          Nursing Notes were reviewed.    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    Other specified disorders of ear, unspecified ear 05/28/2014    Crackling sound in ear    Personal history of other diseases of male genital organs 10/12/2018    History of benign prostatic hyperplasia    Personal history of other diseases of male genital organs 10/12/2018    History of benign prostatic hyperplasia    Personal history of other diseases of male genital organs 10/12/2018    History of benign prostatic hyperplasia    Urgency of urination 10/31/2016    Urinary urgency         SURGICAL HISTORY       Past Surgical History:   Procedure Laterality Date    MR HEAD ANGIO WO IV CONTRAST  1/7/2019    MR HEAD ANGIO WO IV CONTRAST 1/7/2019 [facility] [place], [place] EMERGENCY LEGACY    MR NECK ANGIO WO IV CONTRAST  1/7/2019    MR NECK ANGIO WO IV CONTRAST 1/7/2019 [facility] [place] [place] EMERGENCY  Follow-up Patient Progress Note      CC: type 1 diabetes     History of Present Illness:   74 yr male with Type 1 diabetes since age 39yr with brittle diabetes, hypertension, obesity, hyperlipidemia, atrial fibrillation, orthostasis, anxiety/depression, anemia, adrenal insufficiency and coronary artery disease  Last visit was during hospitalization recently      This last visit, he has been stable  There is reported occasional hypoglycemia during fasting  Other times he has significant hyperglycemia  There is also orthostatic symptoms for which he is on midodrine and fludrocortisone        CGM data review[de-identified]  Device: dexcom Dates: 12/14/22 - 12/27/22 Usage: 93 % Av glu: 215 mg/dL  SD: 58 mg/dL GMI: 8 4  %  TIR: 30 % TAR: 41+29  % TBR:   %    Glycemic patters:    Hypoglycemia: No       Current meds:  Lantus 10 units q h s  Humalog KwikPen 7-6-6 units with each meal  Hydrocortisone 10 mg twice daily regimen     Opthamology: yes  Podiatry: no  vaccination: yes  Dental:  Pancreatitis: no     Ace/ARB: no  Statin:  Lipitor  Thyroid issues:  No    Patient Active Problem List   Diagnosis   • Recurrent hypoglycemia   • Insulin dependent type 1 diabetes mellitus    • Adrenal insufficiency    • Essential hypertension   • Paroxysmal atrial fibrillation    • History of stroke   • Anemia of chronic disease   • Psychiatric disorder   • Type 2 MI (myocardial infarction) ([facility] )   • Orthostatic hypotension   • Unintentional weight loss   • Unspecified protein-calorie malnutrition ([facility] )   • Acute encephalopathy     Past Medical History:   Diagnosis Date   • Diabetes mellitus ([facility] )    • Obesity, morbid ([place] [address] ) 11/14/2022      History reviewed  No pertinent surgical history  History reviewed  No pertinent family history    Social History     Tobacco Use   • Smoking status: Former     Packs/day: 0 25     Years: 30 00     Pack years: 7 50     Types: Cigarettes   • Smokeless tobacco: Never   Substance Use Topics   • Alcohol use: Not Currently     Alcohol/week: 5 0 standard drinks     Types: 5 Cans of beer per week     Comment: Quit in august     Allergies   Allergen Reactions   • Imipramine Other (See Comments)   • Lisinopril Other (See Comments)   • Paroxetine Other (See Comments)   • Penicillins Hives   • Rosiglitazone Other (See Comments)   • Troglitazone Other (See Comments)         Current Outpatient Medications:   •  apixaban (ELIQUIS) 5 mg, Take 1 tablet (5 mg total) by mouth 2 (two) times a day, Disp: 60 tablet, Rfl: 2  •  aspirin 81 mg chewable tablet, Chew 81 mg daily, Disp: , Rfl:   •  atorvastatin (LIPITOR) 10 mg tablet, Take 1 tablet by mouth daily, Disp: , Rfl:   •  calcitriol (ROCALTROL) 0 25 mcg capsule, Take 1 capsule (0 25 mcg total) by mouth daily, Disp: 30 capsule, Rfl: 1  •  cyanocobalamin (VITAMIN B-12) 100 mcg tablet, Take by mouth daily, Disp: , Rfl:   •  fludrocortisone (FLORINEF) 0 1 mg tablet, Take 2 tablets (0 2 mg total) by mouth daily, Disp: 60 tablet, Rfl: 5  •  hydrocortisone (CORTEF) 10 mg tablet, Take 10 mg by mouth 2 (two) times a day, Disp: , Rfl:   •  Insulin Glargine Solostar (Lantus SoloStar) 100 UNIT/ML SOPN, Inject 0 14 mL (14 Units total) under the skin daily at bedtime, Disp: , Rfl:   •  insulin lispro (HumaLOG KwikPen) 100 units/mL injection pen, Inject 6 Units under the skin 3 (three) times a day with meals 7 with breakfast, 6 units with lunch and dinner  Plus sliding scale, Disp: , Rfl:   •  Insulin Pen Needle (BD Pen Needle Blessing 2nd Gen) 32G X 4 MM MISC, For use with insulin pen   Pharmacy may dispense brand covered by insurance , Disp: 100 each, Rfl: 2  •  metoprolol tartrate (LOPRESSOR) 25 mg tablet, Take 12 5 mg by mouth 2 (two) times a day, Disp: , Rfl:   •  midodrine (PROAMATINE) 10 MG tablet, , Disp: , Rfl:   •  NON FORMULARY, 1 Bottle if needed Relion Glucose, Disp: , Rfl:   •  potassium chloride (Klor-Con) 10 mEq tablet, Take 20 mEq by mouth 2 (two) times a day, Disp: , Rfl:   •  risperiDONE LEGACY         CURRENT MEDICATIONS       Previous Medications    ALBUTEROL (VENTOLIN HFA) 90 MCG/ACTUATION INHALER    Inhale 2 puffs every 4 hours if needed for wheezing or shortness of breath.    ATORVASTATIN (LIPITOR) 20 MG TABLET    Take 1 tablet (20 mg) by mouth once daily at bedtime.    AZELASTINE (ASTELIN) 137 MCG (0.1 %) NASAL SPRAY    Administer 2 sprays into affected nostril(s) twice a day.    CETIRIZINE-PSEUDOEPHEDRINE (ZYRTEC-D) 5-120 MG 12 HR TABLET    Take 1 tablet by mouth 2 times a day.    CICLOPIROX (LOPROX) 0.77 % CREAM    Apply topically 2 times a day.    FLUTICASONE (FLONASE) 50 MCG/ACTUATION NASAL SPRAY    Administer 2 sprays into each nostril once daily.    GABAPENTIN (NEURONTIN) 600 MG TABLET    Take 1 tablet (600 mg) by mouth 2 times a day.    LISINOPRIL 20 MG TABLET    Take 1 tablet (20 mg) by mouth once daily.    MELOXICAM (MOBIC) 15 MG TABLET    Take 1 tablet (15 mg) by mouth once daily. With food    MUPIROCIN (BACTROBAN) 2 % OINTMENT    Apply topically 2 times a day. to affected area about 2-3 times a day.    PROPRANOLOL LA (INDERAL LA) 80 MG 24 HR CAPSULE    Take 1 capsule (80 mg) by mouth once daily. Do not crush, chew, or split.    TADALAFIL (CIALIS) 5 MG TABLET    Take 1 tablet (5 mg) by mouth once daily.    TAMSULOSIN (FLOMAX) 0.4 MG 24 HR CAPSULE    Take 1 capsule (0.4 mg) by mouth 2 times a day.       ALLERGIES     Patient has no known allergies.    FAMILY HISTORY       Family History   Problem Relation Name Age of Onset    No Known Problems Mother      No Known Problems Father            SOCIAL HISTORY       Social History     Socioeconomic History    Marital status: Single     Spouse name: Not on file    Number of children: Not on file    Years of education: Not on file    Highest education level: Not on file   Occupational History    Not on file   Tobacco Use    Smoking status: Every Day     Current packs/day: 1.00     Types: Cigarettes    Smokeless tobacco: Never   Vaping Use     (RisperDAL) 1 mg tablet, Take 1 tablet by mouth 2 (two) times a day, Disp: , Rfl:   •  sertraline (ZOLOFT) 100 mg tablet, Take 100 mg by mouth daily, Disp: , Rfl:   •  Insulin Pen Needle (BD Pen Needle Blessing 2nd Gen) 32G X 4 MM MISC, For use with insulin pen  Pharmacy may dispense brand covered by insurance  (Patient not taking: Reported on 11/3/2022), Disp: 100 each, Rfl: 0  •  midodrine (PROAMATINE) 2 5 mg tablet, Take 1 tablet (2 5 mg total) by mouth 2 (two) times a day before meals (Patient not taking: Reported on 12/27/2022), Disp: 60 tablet, Rfl: 0    Review of Systems   Constitutional: Positive for activity change, appetite change and fatigue  HENT: Negative  Eyes: Negative  Respiratory: Negative  Cardiovascular: Negative for chest pain  Gastrointestinal: Negative  Endocrine: Negative  Genitourinary: Negative  Musculoskeletal: Negative  Skin: Negative  Allergic/Immunologic: Negative  Neurological: Negative  Hematological: Negative  Psychiatric/Behavioral: Negative  All other systems reviewed and are negative  Physical Exam:  Body mass index is 32 82 kg/m²  /64 (BP Location: Right arm, Patient Position: Sitting, Cuff Size: Standard)   Pulse 81   Temp 98 5 °F (36 9 °C) (Tympanic)   Ht 5' 4" (1 626 m)   Wt 86 7 kg (191 lb 3 2 oz)   SpO2 99%   BMI 32 82 kg/m²    Vitals:    12/27/22 1151   Weight: 86 7 kg (191 lb 3 2 oz)        Physical Exam  Constitutional:       Appearance: He is well-developed  HENT:      Head: Normocephalic  Eyes:      Pupils: Pupils are equal, round, and reactive to light  Neck:      Thyroid: No thyromegaly  Cardiovascular:      Rate and Rhythm: Normal rate  Heart sounds: Normal heart sounds  Pulmonary:      Effort: Pulmonary effort is normal       Breath sounds: Normal breath sounds  Abdominal:      General: Bowel sounds are normal       Palpations: Abdomen is soft  Musculoskeletal:         General: No deformity  Vaping status: Never Used   Substance and Sexual Activity    Alcohol use: Yes     Alcohol/week: 4.0 standard drinks of alcohol     Types: 4 Cans of beer per week     Comment: states he drank a 12 pack of beer today    Drug use: Never    Sexual activity: Not on file   Other Topics Concern    Not on file   Social History Narrative    Not on file     Social Determinants of Health     Financial Resource Strain: Not on file   Food Insecurity: Not on file   Transportation Needs: Not on file   Physical Activity: Not on file   Stress: Not on file   Social Connections: Not on file   Intimate Partner Violence: Not on file   Housing Stability: Not on file       SCREENINGS                        PHYSICAL EXAM    (up to 7 for level 4, 8 or more for level 5)     ED Triage Vitals [05/11/24 1224]   Temperature Heart Rate Respirations BP   35.9 °C (96.6 °F) 77 18 --      Pulse Ox Temp Source Heart Rate Source Patient Position   97 % Tympanic Monitor Sitting      BP Location FiO2 (%)     Right arm --       Physical Exam  Vitals and nursing note reviewed.   Constitutional:       General: He is not in acute distress.     Appearance: Normal appearance. He is not ill-appearing or toxic-appearing.   HENT:      Head: Normocephalic and atraumatic.      Right Ear: External ear normal.      Left Ear: External ear normal.      Nose: Nose normal.   Eyes:      General:         Right eye: No discharge.         Left eye: No discharge.      Extraocular Movements: Extraocular movements intact.      Conjunctiva/sclera: Conjunctivae normal.      Pupils: Pupils are equal, round, and reactive to light.   Cardiovascular:      Rate and Rhythm: Normal rate and regular rhythm.      Pulses: Normal pulses.      Heart sounds: Normal heart sounds.   Pulmonary:      Effort: Pulmonary effort is normal.      Breath sounds: Normal breath sounds.   Abdominal:      General: There is no distension.      Palpations: Abdomen is soft. There is no mass.      Tenderness:  Cervical back: Normal range of motion  Skin:     Capillary Refill: Capillary refill takes less than 2 seconds  Coloration: Skin is not pale  Findings: No rash  Neurological:      Mental Status: He is alert and oriented to person, place, and time  Labs:   Lab Results   Component Value Date    HGBA1C 7 5 (H) 07/22/2022       No results found for: [id number], TSH, Antoinette Ales, THYROIDAB    Lab Results   Component Value Date    CREATININE 0 93 12/08/2022    CREATININE 1 05 12/07/2022    CREATININE 1 10 12/06/2022    BUN 14 12/08/2022    K 4 1 12/08/2022     (H) 12/08/2022    CO2 27 12/08/2022     eGFR   Date Value Ref Range Status   12/08/2022 85 ml/min/1 73sq m Final       Lab Results   Component Value Date    ALT 27 12/02/2022    AST 42 12/02/2022    ALKPHOS 72 12/02/2022       No results found for: CHOLESTEROL  No results found for: HDL  No results found for: TRIG  No results found for: NONHDLC      Impression:  1  Type 1 diabetes mellitus with hyperglycemia (HCC)    2  Adrenal insufficiency     3  Class 1 obesity without serious comorbidity with body mass index (BMI) of 32 0 to 32 9 in adult, unspecified obesity type    4  Insulin dependent type 1 diabetes mellitus          Plan:    Diagnoses and all orders for this visit:    Type 1 diabetes mellitus with hyperglycemia ([facility] )  He is uncontrolled with an estimated A1c 8 4% per GMI on AGP  He has fasting and postprandial hyperglycemia on most recent AGP for the last 2 weeks  Today we discussed options and I advised to increase basal insulin to 18 units for the next 2 weeks and then to 20 units if fasting glucose remains elevated above 120 milligram per deciliter  Advised to continue prandial insulin as listed below  Send Dexcom data in 6 weeks  Follow-up in 3 months     -     Hemoglobin A1C; Future  -     insulin glargine (Toujeo SoloStar) 300 units/mL CONCENTRATED U-300 injection pen (1-unit dial);  Inject 18 Units under There is no abdominal tenderness. There is no guarding.   Musculoskeletal:         General: Signs of injury present. No deformity.      Comments: Patient is unable to flex the DIP of the right ring finger.  There does appear to be a posterior deformity near the DIP joint with the distal phalanx appearing to be involved posterior to the middle phalanx.  There is also a area of tenderness and swelling to the posterior aspect of the right hand near the base of the fourth and fifth metacarpals   Skin:     General: Skin is warm and dry.   Neurological:      General: No focal deficit present.      Mental Status: He is alert and oriented to person, place, and time. Mental status is at baseline.   Psychiatric:         Mood and Affect: Mood normal.         Behavior: Behavior normal.          DIAGNOSTIC RESULTS     LABS:  Labs Reviewed - No data to display    All other labs were within normal range or not returned as of this dictation.    Imaging  XR hand right 3+ views   Final Result   Acute oblique fracture involving mid shaft of the 4th metacarpal. No   additional fractures or dislocation. No radiopaque foreign body.             Signed by: Danie Cole 5/11/2024 1:12 PM   Dictation workstation:   [id number]      XR fingers right 2+ views   Final Result   Acute mildly displaced oblique fracture involving mid shaft of the   4th metacarpal as above. No additional fractures or dislocation.             MACRO:   None        Signed by: Danie Cole 5/11/2024 1:06 PM   Dictation workstation:   [id number]           Procedures  Orthopaedic Injury Treatment - Upper Extremity    Performed by: Sha House DO  Authorized by: Sha Hester MD    Consent:     Consent obtained:  Emergent situation    Risks discussed:  Irreducible dislocation    Alternatives discussed:  No treatment  Universal protocol:     Patient identity confirmed:  Verbally with patient  Location:     Location:  Finger    Finger location:  R ring finger    Finger  dislocation type: DIP    Pre-procedure details:     Pre-procedure imaging:  X-ray    Imaging findings: dislocation present      Distal perfusion: normal    Sedation:     Sedation type:  None  Anesthesia:     Anesthesia method:  None  Procedure details:     Manipulation performed: yes      Finger reduction method:  Direct traction    Reduction successful: yes      Reduction confirmed with imaging: no      Immobilization:  Splint    Splint type:  Static finger    Supplies used:  Aluminum splint  Post-procedure details:     Neurological function: normal      Distal perfusion: normal      Range of motion: normal      Procedure completion:  Tolerated       EMERGENCY DEPARTMENT COURSE/MDM:   Medical Decision Making  67-year-old male presenting with a chief complaint of a right hand and right ring finger injury.  Sustained while moving a cabinet today causing her to land on the right hand.  On exam patient does not have a notable inability to flex the DIP joint of the right ring finger.  The distal physis appear to be more posterior.  There is also swelling about the posterior aspect of the right hand near the base of the fourth and fifth metacarpals.  Differential clues not limited to phalanx fracture, dislocated phalanx, metacarpal fracture.  Will evaluate with x-rays of the finger and hand.        Please see ED course for additional MDM.    ED Course as of 05/11/24 1343   Sat May 11, 2024   1332 X-ray of the hand [CL]   1333 X-ray of the hand with an oblique fracture involving the midshaft of the fourth metacarpal.  X-ray of the fingers demonstrate acute dislocation of the DIP joint.  Distal phalanx is dislocated posteriorly.  This was reduced at bedside and patient was placed in a finger splint.  The hand is otherwise placed in a ulnar gutter splint and patient was given information to follow-up with orthopedics as outpatient regarding the fourth metacarpal fracture. [CL]   1334 Reexamined after splint was placed and  the skin daily at bedtime  -     Insulin Pen Needle (BD Pen Needle Blessing 2nd Gen) 32G X 4 MM MISC; For use with insulin pen  Pharmacy may dispense brand covered by insurance        Humalog Insulin    7u    6u    6u     Regular, Apidra, Humalog orNovolog Sliding Scale:   <80                      151-220 +1  +1 +1     221-290 +2 +2 +2 +   291-360 +3 +3 +3 +   361-430 +4 +4 +4 +   >430 +5 +5 +5 +         Lantus Insulin       18u        Adrenal insufficiency  Given reported orthostatic symptoms as well as fasting hypoglycemia, will increase to hydrocortisone 15 mg a m  and 10 mg p m  dose  As usual, advised to double the dose during times of acute infection or significant stress  -     hydrocortisone (CORTEF) 5 mg tablet; Take 15mg(3 tabs) on waking up and  10mg(2 tabs) in evening    Class 1 obesity without serious comorbidity with body mass index (BMI) of 32 0 to 32 9 in adult, unspecified obesity type  Advised to start aerobic and aerobic exercise as tolerated  I have spent 35 minutes with patient today in which greater than 50% of this time was spent in counseling/coordination of care  Discussed with the patient and all questioned fully answered  He will call me if any problems arise  Educated/ Counseled patient on diagnostic test results, prognosis, risk vs benefit of treatment options, importance of treatment compliance, healthy life and lifestyle choices        [address] patient had a normal neurologic function knee right hand.  Sensation intact.  Capillary refill normal. [CL]   1342 Patient is given information to follow-up with orthopedics as outpatient.  He is agreement with this plan.  Patient discharged stable condition. [CL]      ED Course User Index  [CL] Sha House DO         Diagnoses as of 05/11/24 1343   Closed nondisplaced fracture of shaft of fourth metacarpal bone of right hand, initial encounter   Dislocation of distal interphalangeal (DIP) joint of finger, initial encounter        Patient and or family in agreement and understanding of treatment plan.  All questions answered.      I reviewed the case with the attending ED physician. The attending ED physician agrees with the plan. Patient and/or patient´s representative was counseled regarding labs, imaging, likely diagnosis, and plan. All questions were answered.    ED Medications administered this visit:  Medications - No data to display    New Prescriptions from this visit:    New Prescriptions    No medications on file       Follow-up:  Matt Russo MD  [address]   [facility], [address] [place], [place] [id number] [phone]    Schedule an appointment as soon as possible for a visit           Final Impression:   1. Closed nondisplaced fracture of shaft of fourth metacarpal bone of right hand, initial encounter    2. Dislocation of distal interphalangeal (DIP) joint of finger, initial encounter          (Please note that portions of this note were completed with a voice recognition program.  Efforts were made to edit the dictations but occasionally words are mis-transcribed.)     Sha House DO  Resident  05/11/24 1241       Sha House DO  Resident  05/11/24 1343

## 2024-05-12 DIAGNOSIS — I21.A1 TYPE 2 MI (MYOCARDIAL INFARCTION) (HCC): ICD-10-CM

## 2024-06-02 DIAGNOSIS — E10.9 INSULIN DEPENDENT TYPE 1 DIABETES MELLITUS (HCC): ICD-10-CM

## 2024-06-02 DIAGNOSIS — E10.649 TYPE 1 DIABETES MELLITUS WITH HYPOGLYCEMIA AND WITHOUT COMA (HCC): ICD-10-CM

## 2024-06-03 RX ORDER — ACYCLOVIR 400 MG/1
1 TABLET ORAL
Qty: 9 EACH | Refills: 1 | Status: SHIPPED | OUTPATIENT
Start: 2024-06-03

## 2024-06-03 RX ORDER — PEN NEEDLE, DIABETIC 32GX 5/32"
NEEDLE, DISPOSABLE MISCELLANEOUS
Qty: 200 EACH | Refills: 1 | Status: SHIPPED | OUTPATIENT
Start: 2024-06-03

## 2024-06-06 DIAGNOSIS — R60.0 LEG EDEMA: Primary | ICD-10-CM

## 2024-06-06 RX ORDER — FUROSEMIDE 20 MG/1
20 TABLET ORAL DAILY PRN
Qty: 15 TABLET | Refills: 0 | Status: SHIPPED | OUTPATIENT
Start: 2024-06-06

## 2024-06-13 ENCOUNTER — TELEPHONE (OUTPATIENT)
Dept: CARDIOLOGY CLINIC | Facility: CLINIC | Age: 69
End: 2024-06-13

## 2024-06-13 ENCOUNTER — APPOINTMENT (OUTPATIENT)
Dept: LAB | Facility: CLINIC | Age: 69
End: 2024-06-13
Payer: COMMERCIAL

## 2024-06-13 ENCOUNTER — OFFICE VISIT (OUTPATIENT)
Dept: CARDIOLOGY CLINIC | Facility: CLINIC | Age: 69
End: 2024-06-13
Payer: COMMERCIAL

## 2024-06-13 VITALS
BODY MASS INDEX: 33.46 KG/M2 | HEIGHT: 64 IN | OXYGEN SATURATION: 95 % | WEIGHT: 196 LBS | DIASTOLIC BLOOD PRESSURE: 85 MMHG | HEART RATE: 77 BPM | SYSTOLIC BLOOD PRESSURE: 147 MMHG

## 2024-06-13 DIAGNOSIS — E10.649 TYPE 1 DIABETES MELLITUS WITH HYPOGLYCEMIA AND WITHOUT COMA (HCC): ICD-10-CM

## 2024-06-13 DIAGNOSIS — I50.33 ACUTE ON CHRONIC HEART FAILURE WITH PRESERVED EJECTION FRACTION (HFPEF) (HCC): ICD-10-CM

## 2024-06-13 DIAGNOSIS — E78.2 MIXED HYPERLIPIDEMIA: ICD-10-CM

## 2024-06-13 DIAGNOSIS — E10.43 TYPE 1 DIABETES MELLITUS WITH DIABETIC AUTONOMIC (POLY)NEUROPATHY (HCC): ICD-10-CM

## 2024-06-13 DIAGNOSIS — E87.6 HYPOKALEMIA: ICD-10-CM

## 2024-06-13 DIAGNOSIS — E66.01 MORBID (SEVERE) OBESITY DUE TO EXCESS CALORIES (HCC): ICD-10-CM

## 2024-06-13 DIAGNOSIS — E87.6 HYPOKALEMIA: Primary | ICD-10-CM

## 2024-06-13 DIAGNOSIS — D63.8 ANEMIA OF CHRONIC DISEASE: ICD-10-CM

## 2024-06-13 DIAGNOSIS — R55 SYNCOPE: ICD-10-CM

## 2024-06-13 DIAGNOSIS — Z86.73 HISTORY OF CVA (CEREBROVASCULAR ACCIDENT): ICD-10-CM

## 2024-06-13 DIAGNOSIS — I73.9 PAD (PERIPHERAL ARTERY DISEASE) (HCC): ICD-10-CM

## 2024-06-13 DIAGNOSIS — E83.42 HYPOMAGNESEMIA: ICD-10-CM

## 2024-06-13 DIAGNOSIS — R60.0 LEG EDEMA: ICD-10-CM

## 2024-06-13 DIAGNOSIS — E27.40 ADRENAL INSUFFICIENCY (HCC): ICD-10-CM

## 2024-06-13 DIAGNOSIS — I48.0 PAROXYSMAL ATRIAL FIBRILLATION (HCC): ICD-10-CM

## 2024-06-13 DIAGNOSIS — I95.1 ORTHOSTATIC HYPOTENSION: ICD-10-CM

## 2024-06-13 DIAGNOSIS — I50.32 CHRONIC DIASTOLIC HEART FAILURE (HCC): Primary | ICD-10-CM

## 2024-06-13 PROBLEM — R57.9 SHOCK (HCC): Status: RESOLVED | Noted: 2023-12-14 | Resolved: 2024-06-13

## 2024-06-13 PROBLEM — I21.A1 TYPE 2 MI (MYOCARDIAL INFARCTION) (HCC): Status: RESOLVED | Noted: 2022-11-03 | Resolved: 2024-06-13

## 2024-06-13 PROBLEM — N18.2 STAGE 2 CHRONIC KIDNEY DISEASE: Status: ACTIVE | Noted: 2024-06-13

## 2024-06-13 LAB
ALBUMIN SERPL BCP-MCNC: 3.5 G/DL (ref 3.5–5)
ALP SERPL-CCNC: 103 U/L (ref 34–104)
ALT SERPL W P-5'-P-CCNC: 40 U/L (ref 7–52)
ANION GAP SERPL CALCULATED.3IONS-SCNC: 11 MMOL/L (ref 4–13)
AST SERPL W P-5'-P-CCNC: 35 U/L (ref 13–39)
BASOPHILS # BLD AUTO: 0.02 THOUSANDS/ÂΜL (ref 0–0.1)
BASOPHILS NFR BLD AUTO: 0 % (ref 0–1)
BILIRUB SERPL-MCNC: 0.61 MG/DL (ref 0.2–1)
BNP SERPL-MCNC: 417 PG/ML (ref 0–100)
BUN SERPL-MCNC: 13 MG/DL (ref 5–25)
CALCIUM SERPL-MCNC: 8 MG/DL (ref 8.4–10.2)
CHLORIDE SERPL-SCNC: 100 MMOL/L (ref 96–108)
CO2 SERPL-SCNC: 30 MMOL/L (ref 21–32)
CREAT SERPL-MCNC: 1.28 MG/DL (ref 0.6–1.3)
EOSINOPHIL # BLD AUTO: 0.15 THOUSAND/ÂΜL (ref 0–0.61)
EOSINOPHIL NFR BLD AUTO: 1 % (ref 0–6)
ERYTHROCYTE [DISTWIDTH] IN BLOOD BY AUTOMATED COUNT: 15.3 % (ref 11.6–15.1)
EST. AVERAGE GLUCOSE BLD GHB EST-MCNC: 154 MG/DL
FERRITIN SERPL-MCNC: 37 NG/ML (ref 24–336)
GFR SERPL CREATININE-BSD FRML MDRD: 57 ML/MIN/1.73SQ M
GLUCOSE SERPL-MCNC: 310 MG/DL (ref 65–140)
HBA1C MFR BLD: 7 %
HCT VFR BLD AUTO: 33.9 % (ref 36.5–49.3)
HGB BLD-MCNC: 10.9 G/DL (ref 12–17)
IMM GRANULOCYTES # BLD AUTO: 0.08 THOUSAND/UL (ref 0–0.2)
IMM GRANULOCYTES NFR BLD AUTO: 1 % (ref 0–2)
IRON SATN MFR SERPL: 12 % (ref 15–50)
IRON SERPL-MCNC: 40 UG/DL (ref 50–212)
LYMPHOCYTES # BLD AUTO: 1.37 THOUSANDS/ÂΜL (ref 0.6–4.47)
LYMPHOCYTES NFR BLD AUTO: 13 % (ref 14–44)
MAGNESIUM SERPL-MCNC: 1.7 MG/DL (ref 1.9–2.7)
MCH RBC QN AUTO: 31.5 PG (ref 26.8–34.3)
MCHC RBC AUTO-ENTMCNC: 32.2 G/DL (ref 31.4–37.4)
MCV RBC AUTO: 98 FL (ref 82–98)
MONOCYTES # BLD AUTO: 1.09 THOUSAND/ÂΜL (ref 0.17–1.22)
MONOCYTES NFR BLD AUTO: 10 % (ref 4–12)
NEUTROPHILS # BLD AUTO: 8.14 THOUSANDS/ÂΜL (ref 1.85–7.62)
NEUTS SEG NFR BLD AUTO: 75 % (ref 43–75)
NRBC BLD AUTO-RTO: 0 /100 WBCS
PLATELET # BLD AUTO: 195 THOUSANDS/UL (ref 149–390)
PMV BLD AUTO: 11.3 FL (ref 8.9–12.7)
POTASSIUM SERPL-SCNC: 2.9 MMOL/L (ref 3.5–5.3)
PROT SERPL-MCNC: 6 G/DL (ref 6.4–8.4)
PTH-INTACT SERPL-MCNC: 101.4 PG/ML (ref 12–88)
RBC # BLD AUTO: 3.46 MILLION/UL (ref 3.88–5.62)
SODIUM SERPL-SCNC: 141 MMOL/L (ref 135–147)
TIBC SERPL-MCNC: 328 UG/DL (ref 250–450)
UIBC SERPL-MCNC: 288 UG/DL (ref 155–355)
WBC # BLD AUTO: 10.85 THOUSAND/UL (ref 4.31–10.16)

## 2024-06-13 PROCEDURE — 83735 ASSAY OF MAGNESIUM: CPT

## 2024-06-13 PROCEDURE — 83880 ASSAY OF NATRIURETIC PEPTIDE: CPT

## 2024-06-13 PROCEDURE — 83036 HEMOGLOBIN GLYCOSYLATED A1C: CPT

## 2024-06-13 PROCEDURE — 93000 ELECTROCARDIOGRAM COMPLETE: CPT | Performed by: NURSE PRACTITIONER

## 2024-06-13 PROCEDURE — 99215 OFFICE O/P EST HI 40 MIN: CPT | Performed by: NURSE PRACTITIONER

## 2024-06-13 RX ORDER — MIDODRINE HYDROCHLORIDE 10 MG/1
TABLET ORAL
Start: 2024-06-13

## 2024-06-13 RX ORDER — FUROSEMIDE 20 MG/1
20 TABLET ORAL DAILY
Qty: 30 TABLET | Refills: 5 | Status: SHIPPED | OUTPATIENT
Start: 2024-06-13

## 2024-06-13 RX ORDER — CALCIUM CARBONATE/VITAMIN D3 500-10/5ML
400 LIQUID (ML) ORAL 2 TIMES DAILY
Qty: 180 TABLET | Refills: 3 | Status: SHIPPED | OUTPATIENT
Start: 2024-06-13

## 2024-06-13 RX ORDER — POTASSIUM CHLORIDE 20 MEQ/1
40 TABLET, EXTENDED RELEASE ORAL 2 TIMES DAILY
Qty: 120 TABLET | Refills: 3 | Status: SHIPPED | OUTPATIENT
Start: 2024-06-13

## 2024-06-13 NOTE — PROGRESS NOTES
Cardiology  Acute  Follow Up Office Visit Note -     Karson You   69 y.o.   male   MRN: [id number]  [facility]  [address]  [address]  [place] [id number] [phone]    PCP: Teri Block MD  Cardiologist : Dr Reza                Summary of Plan:  BMP, BNP today  May need electrolyte replacement-  he has been off all K and mag for some time  For now, discontinue salt tablets.  I also recommend a salt restricted diet.  Education was provided  For now, will make midodrine as needed for systolic blood pressure less than 90 hold for systolic blood pressure greater than 140  Repeat BNP+ mag 6/25, will also add a fasting lipid profile  F/U nephr 6/25  Follow up will be scheduled with Dr Reza July 11  Colon Ca screening: 10/05/2023, up-to-date      Impression/plan  Acute on chronic HFpEF secondary to overtreatment for orthostatic hypotension  His daughter reports that this week his weight at home was 184, 20 pounds over his dry weight  Wt Readings from Last 3 Encounters:   06/13/24 88.9 kg (196 lb)   03/18/24 78.9 kg (174 lb)   03/08/24 81.8 kg (180 lb 4.8 oz)   --beta-blocker:    --Diuretic: Lasix 20 mg daily as needed.  Will make Lasix 20 mg daily until reassessed  --2 g sodium diet, 1800 cc fluid restriction. Daily weights.   [id number] 5: Maintained on Eliquis 5 mg BID  EKG today normal sinus rhythm 77 bpm.  PACs.  QTc 457 ms.  Secondary adrenal insufficiency following with endocrinology  Maintained on hydrocortisone 20 mg am and 15 mg pm,   Advised to double dose for sickness/stress.   Orthostatic hypotension-  Maintained on midodrine 15 mg TID, Florinef 0.2 mg daily, salt tablets 1 g 3 times daily  6/13/24: will stop salt tablets.  Will make midodrine as needed for blood pressure parameters as above  Uses abdominal binder and compression stockings.   /85   CKD baseline creatinine 1.2 followed by nephrology  Hyperlipidemia. On atorvastatin 10 mg/d.   8/2022 LDL 64 non-HDL 81.  Chronic anemia  Type 1 diabetes mellitus with polyneuropathy, on insulin  History CVA, maintained on aspirin and statin  Cardiac testing  SPECT 8/20/2022 [facility] No evidence of ischemia. Likely mild diaphragmatic attenuation artifact at proximal inferior wall.  Normal left ventricular wall motion and ejection fraction.   TTE 12/5/2023.  EF 60%.  Wall motion normal.  Diastolic function normal.  RV normal size and function.                HPI:   Karson You is a 69 y.o.year old male with secondary adrenal insufficiency, orthostatic hypotension, type 1 diabetes mellitus, anemia, paroxysmal atrial fibrillation,who has a  history of CVA.  He follows with Dr. Reza.  He was last seen by telemedicine 2/23/2023.  His main issue has been adrenal insufficiency and orthostatic hypotension.  He has been maintained on high-dose midodrine 15 mg 3 times daily, Florinef 0.2 mg daily, hydrocortisone 20 mg in the morning 50 mg in the p.m. and salt tablets 1 g 3 times daily.  He is also followed carefully with endocrinology and neurology.  There has been on some discussion whether he would benefit from Northera.  He is taking care of very closely by his daughter    3/12/2024 ED admission   CC: Confusion  Workup disclosed a potassium level of 3.1 which was repleted    6/13/2024 Acute visit  June 6 MyChart messaging from patient's daughter her father had edema of the legs hands and weight was increased by 10 pounds.  Prescribed Lasix 20 mg x 3 days and then advised to stop    She notified us June 11 that there was no change in his symptoms and the blood pressure was elevated.    Advised by his cardiologist to schedule an office visit  ROS: He is short of breath.  He has lower extremity edema.  Has increasing cough.  EKG normal sinus rhythm 77 bpm  /85  Weight 196 pounds.  Home weight 184 pounds this week.  Per the daughter 20 pounds over his dry weight  She has been holding the salt tablets.  She has also  been holding the midodrine    Today he is volume overloaded.  He is no acute distress however.  Will obtain nonfasting baseline labs today  Will make Lasix 20 mg daily.  Will make midodrine as needed.  BP parameters as above.  Will obtain follow-up labs around the 25th.  I recommend a salt restricted diet for now       Assessment  Diagnoses and all orders for this visit:    Chronic diastolic heart failure (HCC)    Paroxysmal atrial fibrillation   -     POCT ECG    PAD (peripheral artery disease) ([facility])    Adrenal insufficiency ([facility])    Type 1 diabetes mellitus with diabetic autonomic (poly)neuropathy ([facility])    Morbid (severe) obesity due to excess calories ([facility])    History of CVA (cerebrovascular accident)    Orthostatic hypotension    Mixed hyperlipidemia  -     Lipid panel; Future    Acute on chronic heart failure with preserved ejection fraction (HFpEF) ([facility])  -     Basic metabolic panel; Future  -     B-Type Natriuretic Peptide(BNP); Future  -     Basic metabolic panel; Future  -     Magnesium; Future    Leg edema  -     furosemide (LASIX) 20 mg tablet; Take 1 tablet (20 mg total) by mouth daily    Syncope  -     midodrine (PROAMATINE) 10 MG tablet; One tablet TID with meals PRN .Hold for SBP under 140        Past Medical History:   Diagnosis Date    A-fib ([facility])     Adrenal insufficiency ([facility])     Atrial fibrillation ([facility])     Diabetes mellitus ([facility])     History of stroke 10/19/2022    Obesity, morbid ([facility]) 11/14/2022    Shock ([facility]) 12/14/2023    Stroke ([facility])     Type 2 MI (myocardial infarction) ([facility]) 11/03/2022       Review of Systems   Constitutional: Positive for weight gain. Negative for chills.   Cardiovascular:  Positive for dyspnea on exertion and leg swelling. Negative for chest pain, claudication, cyanosis, irregular heartbeat, near-syncope, orthopnea, palpitations, paroxysmal nocturnal dyspnea and syncope.   Respiratory:  Negative for cough and shortness of breath.    Gastrointestinal:  Negative for  heartburn and nausea.   Neurological:  Negative for dizziness, focal weakness, headaches, light-headedness and weakness.   All other systems reviewed and are negative.      Allergies   Allergen Reactions    Imipramine Other (See Comments)    Lisinopril Other (See Comments)    Paroxetine Other (See Comments)    Penicillins Hives    Rosiglitazone Other (See Comments)    Troglitazone Other (See Comments)     .    Current Outpatient Medications:     acetaminophen (TYLENOL) 325 mg tablet, Take 3 tablets (975 mg total) by mouth every 8 (eight) hours, Disp: , Rfl:     albuterol (2.5 mg/3 mL) 0.083 % nebulizer solution, Take 3 mL (2.5 mg total) by nebulization every 6 (six) hours as needed for wheezing or shortness of breath, Disp: , Rfl:     albuterol (PROVENTIL HFA,VENTOLIN HFA) 90 mcg/act inhaler, Inhale 2 puffs every 4 (four) hours as needed for wheezing, Disp: , Rfl:     apixaban (Eliquis) 5 mg, Take 1 tablet (5 mg total) by mouth 2 (two) times a day, Disp: 180 tablet, Rfl: 0    aspirin 81 mg chewable tablet, Chew 81 mg daily, Disp: , Rfl:     atorvastatin (LIPITOR) 10 mg tablet, Take 1 tablet by mouth daily, Disp: , Rfl:     calcitriol (ROCALTROL) 0.25 mcg capsule, Take 1 capsule (0.25 mcg total) by mouth daily Do not start before November 15, 2023., Disp: 30 capsule, Rfl: 0    Continuous Blood Gluc  (Dexcom G7 ) CYN, Use 1 each continuous, Disp: 1 each, Rfl: 0    Continuous Glucose Sensor (Dexcom G7 Sensor), Use 1 Device every 10 days, Disp: 9 each, Rfl: 1    cyanocobalamin (VITAMIN B-12) 100 MCG tablet, Take 1 tablet (100 mcg total) by mouth daily Do not start before November 15, 2023., Disp: 30 tablet, Rfl: 0    fludrocortisone (FLORINEF) 0.1 mg tablet, Take 2 tablets (0.2 mg total) by mouth daily, Disp: 60 tablet, Rfl: 5    furosemide (LASIX) 20 mg tablet, Take 1 tablet (20 mg total) by mouth daily, Disp: 30 tablet, Rfl: 5    hydrocortisone (CORTEF) 5 mg tablet, Use 4tabs (20mg) in morning and 3  tabs (15mg) daily afternoon Do not start before December 22, 2023., Disp: , Rfl:     insulin glargine (Toujeo Max SoloStar) 300 units/mL CONCENTRATED U-300 injection pen (2-unit dial), Inject 12 Units under the skin daily before breakfast, Disp: 6 mL, Rfl: 0    insulin lispro (HumALOG/ADMELOG) 100 units/mL injection, USE 5 UNITS BEFORE EACH MEAL PLUS 1 UNITS/60 ABOVE 150MG/DL MAX DOSE 40 UNITS/DAY, Disp: 30 mL, Rfl: 0    Insulin Pen Needle (BD Pen Needle Josie 2nd Gen) 32G X 4 MM MISC, FOR USE WITH INSULIN PEN. PHARMACY MAY DISPENSE BRAND COVERED BY INSURANCE., Disp: 100 each, Rfl: 5    Insulin Pen Needle (BD Pen Needle Josie 2nd Gen) 32G X 4 MM MISC, For use with insulin pen 4 times daily. Pharmacy may dispense brand covered by insurance., Disp: 200 each, Rfl: 1    midodrine (PROAMATINE) 10 MG tablet, One tablet TID with meals PRN .Hold for SBP under 140, Disp: , Rfl:     sertraline (ZOLOFT) 100 mg tablet, Take 100 mg by mouth daily, Disp: , Rfl:     ferrous sulfate 324 (65 Fe) mg, TAKE 1 TABLET (324 MG TOTAL) BY MOUTH DAILY BEFORE BREAKFAST (Patient not taking: Reported on 6/13/2024), Disp: 90 tablet, Rfl: 2    risperiDONE (RisperDAL) 1 mg tablet, Take 1 tablet by mouth 2 (two) times a day (Patient not taking: Reported on 6/13/2024), Disp: , Rfl:     Social History     Socioeconomic History    Marital status: Single     Spouse name: Not on file    Number of children: Not on file    Years of education: Not on file    Highest education level: Not on file   Occupational History    Not on file   Tobacco Use    Smoking status: Former     Current packs/day: 0.25     Average packs/day: 0.3 packs/day for 30.0 years (7.5 ttl pk-yrs)     Types: Cigarettes    Smokeless tobacco: Never   Vaping Use    Vaping status: Never Used   Substance and Sexual Activity    Alcohol use: Not Currently     Alcohol/week: 5.0 standard drinks of alcohol     Types: 5 Cans of beer per week     Comment: Quit in august 2022    Drug use: Never     Sexual activity: Not Currently   Other Topics Concern    Not on file   Social History Narrative    ** Merged History Encounter **          Social Determinants of Health     Financial Resource Strain: Not on file   Food Insecurity: No Food Insecurity (3/9/2024)    Hunger Vital Sign     Worried About Running Out of Food in the Last Year: Never true     Ran Out of Food in the Last Year: Never true   Transportation Needs: No Transportation Needs (3/9/2024)    PRAPARE - Transportation     Lack of Transportation (Medical): No     Lack of Transportation (Non-Medical): No   Physical Activity: Not on file   Stress: Not on file   Social Connections: Not on file   Intimate Partner Violence: Not on file   Housing Stability: High Risk (3/9/2024)    Housing Stability Vital Sign     Unable to Pay for Housing in the Last Year: No     Number of Times Moved in the Last Year: 2     Homeless in the Last Year: No       Family History   Problem Relation Age of Onset    Heart disease Mother     Cancer Father     Multiple sclerosis Sister        Physical Exam  Vitals and nursing note reviewed.   Constitutional:       General: He is not in acute distress.  HENT:      Head: Normocephalic and atraumatic.   Eyes:      Extraocular Movements: Extraocular movements intact.      Conjunctiva/sclera: Conjunctivae normal.   Cardiovascular:      Rate and Rhythm: Normal rate and regular rhythm.      Pulses: Intact distal pulses.      Heart sounds: Normal heart sounds.   Pulmonary:      Effort: Pulmonary effort is normal.      Breath sounds: Decreased breath sounds present.   Abdominal:      General: Bowel sounds are normal.      Palpations: Abdomen is soft.   Musculoskeletal:         General: Normal range of motion.      Cervical back: Normal range of motion and neck supple.      Right lower leg: Edema present.      Left lower leg: Edema present.   Skin:     General: Skin is warm and dry.   Neurological:      Mental Status: He is alert and oriented to  "person, place, and time.   Psychiatric:         Mood and Affect: Mood normal.         Vitals: Blood pressure 147/85, pulse 77, height 5' 4\" (1.626 m), weight 88.9 kg (196 lb), SpO2 95%.   Wt Readings from Last 3 Encounters:   06/13/24 88.9 kg (196 lb)   03/18/24 78.9 kg (174 lb)   03/08/24 81.8 kg (180 lb 4.8 oz)         Labs & Results:  Lab Results   Component Value Date    WBC 12.82 (H) 03/14/2024    HGB 12.1 03/14/2024    HCT 38.4 03/14/2024     (H) 03/14/2024     03/14/2024     BNP   Date Value Ref Range Status   12/19/2023 666 (H) 0 - 100 pg/mL Final     No components found for: \"CHEM\"    No results found for this or any previous visit.    No results found for this or any previous visit.        This note was completed in part utilizing [employer] direct voice recognition software.   Grammatical errors, random word insertion, spelling mistakes, and incomplete sentences may be an occasional consequence of the system secondary to software limitations, ambient noise and hardware issues. At the time of dictation, efforts were made to edit, clarify and /or correct errors.  Please read the chart carefully and recognize, using context, where substitutions have occurred.  If you have any questions or concerns about the context, text or information contained within the body of this dictation, please contact myself, the provider, for further clarification      "

## 2024-06-13 NOTE — TELEPHONE ENCOUNTER
Patient's daughter was contacted and made aware of results and recommendations. She is agreeable to plan.

## 2024-06-13 NOTE — LETTER
June 13, 2024     Teri Block MD  [address]  [address]  [facility] [address]    Patient: Karson You   YOB: 1955   Date of Visit: 6/13/2024       Dear Dr. Block:    Thank you for referring Karson You to me for evaluation. Below are my notes for this consultation.    If you have questions, please do not hesitate to call me. I look forward to following your patient along with you.         Sincerely,        NACHO Gonzalez        CC: MD Blanca Espinoza CRNP  6/13/2024  9:17 AM  Sign when Signing Visit  Cardiology  Acute  Follow Up Office Visit Note -     Karson You   69 y.o.   male   MRN: [id number]  [facility] [place]  [address]  [address]  [facility] [id number] [phone]    PCP: Teri Block MD  Cardiologist : Dr Reza                Summary of Plan:  BMP, BNP today  May need electrolyte replacement-  he has been off all K and mag for some time  For now, discontinue salt tablets.  I also recommend a salt restricted diet.  Education was provided  For now, will make midodrine as needed for systolic blood pressure less than 90 hold for systolic blood pressure greater than 140  Repeat BNP+ mag 6/25, will also add a fasting lipid profile  F/U nephr 6/25  Follow up will be scheduled with Dr Reza July 11  Colon Ca screening: 10/05/2023, up-to-date      Impression/plan  Acute on chronic HFpEF secondary to overtreatment for orthostatic hypotension  His daughter reports that this week his weight at home was 184, 20 pounds over his dry weight  Wt Readings from Last 3 Encounters:   06/13/24 88.9 kg (196 lb)   03/18/24 78.9 kg (174 lb)   03/08/24 81.8 kg (180 lb 4.8 oz)   --beta-blocker:    --Diuretic: Lasix 20 mg daily as needed.  Will make Lasix 20 mg daily until reassessed  --2 g sodium diet, 1800 cc fluid restriction. Daily weights.   [id number] 5: Maintained on Eliquis 5 mg BID  EKG today normal sinus rhythm 77 bpm.   PACs.  QTc 457 ms.  Secondary adrenal insufficiency following with endocrinology  Maintained on hydrocortisone 20 mg am and 15 mg pm,   Advised to double dose for sickness/stress.   Orthostatic hypotension-  Maintained on midodrine 15 mg TID, Florinef 0.2 mg daily, salt tablets 1 g 3 times daily  6/13/24: will stop salt tablets.  Will make midodrine as needed for blood pressure parameters as above  Uses abdominal binder and compression stockings.   /85   CKD baseline creatinine 1.2 followed by nephrology  Hyperlipidemia. On atorvastatin 10 mg/d.  8/2022 LDL 64 non-HDL 81.  Chronic anemia  Type 1 diabetes mellitus with polyneuropathy, on insulin  History CVA, maintained on aspirin and statin  Cardiac testing  SPECT 8/20/2022 [facility] No evidence of ischemia. Likely mild diaphragmatic attenuation artifact at proximal inferior wall.  Normal left ventricular wall motion and ejection fraction.   TTE 12/5/2023.  EF 60%.  Wall motion normal.  Diastolic function normal.  RV normal size and function.                HPI:   Karson You is a 69 y.o.year old male with secondary adrenal insufficiency, orthostatic hypotension, type 1 diabetes mellitus, anemia, paroxysmal atrial fibrillation,who has a  history of CVA.  He follows with Dr. Reza.  He was last seen by telemedicine 2/23/2023.  His main issue has been adrenal insufficiency and orthostatic hypotension.  He has been maintained on high-dose midodrine 15 mg 3 times daily, Florinef 0.2 mg daily, hydrocortisone 20 mg in the morning 50 mg in the p.m. and salt tablets 1 g 3 times daily.  He is also followed carefully with endocrinology and neurology.  There has been on some discussion whether he would benefit from Northera.  He is taking care of very closely by his daughter    3/12/2024 ED admission   CC: Confusion  Workup disclosed a potassium level of 3.1 which was repleted    6/13/2024 Acute visit  June 6 MyChart messaging from patient's daughter her father had edema of  the legs hands and weight was increased by 10 pounds.  Prescribed Lasix 20 mg x 3 days and then advised to stop    She notified us June 11 that there was no change in his symptoms and the blood pressure was elevated.    Advised by his cardiologist to schedule an office visit  ROS: He is short of breath.  He has lower extremity edema.  Has increasing cough.  EKG normal sinus rhythm 77 bpm  /85  Weight 196 pounds.  Home weight 184 pounds this week.  Per the daughter 20 pounds over his dry weight  She has been holding the salt tablets.  She has also been holding the midodrine    Today he is volume overloaded.  He is no acute distress however.  Will obtain nonfasting baseline labs today  Will make Lasix 20 mg daily.  Will make midodrine as needed.  BP parameters as above.  Will obtain follow-up labs around the 25th.  I recommend a salt restricted diet for now       Assessment  Diagnoses and all orders for this visit:    Chronic diastolic heart failure (HCC)    Paroxysmal atrial fibrillation   -     POCT ECG    PAD (peripheral artery disease) ([facility])    Adrenal insufficiency ([facility])    Type 1 diabetes mellitus with diabetic autonomic (poly)neuropathy ([facility])    Morbid (severe) obesity due to excess calories ([facility])    History of CVA (cerebrovascular accident)    Orthostatic hypotension    Mixed hyperlipidemia  -     Lipid panel; Future    Acute on chronic heart failure with preserved ejection fraction (HFpEF) ([facility])  -     Basic metabolic panel; Future  -     B-Type Natriuretic Peptide(BNP); Future  -     Basic metabolic panel; Future  -     Magnesium; Future    Leg edema  -     furosemide (LASIX) 20 mg tablet; Take 1 tablet (20 mg total) by mouth daily    Syncope  -     midodrine (PROAMATINE) 10 MG tablet; One tablet TID with meals PRN .Hold for SBP under 140        Past Medical History:   Diagnosis Date   • A-fib ([facility])    • Adrenal insufficiency (HCC)    • Atrial fibrillation (HCC)    • Diabetes mellitus (HCC)    •  History of stroke 10/19/2022   • Obesity, morbid ([facility]) 11/14/2022   • Shock ([facility]) 12/14/2023   • Stroke ([facility])    • Type 2 MI (myocardial infarction) ([facility]) 11/03/2022       Review of Systems   Constitutional: Positive for weight gain. Negative for chills.   Cardiovascular:  Positive for dyspnea on exertion and leg swelling. Negative for chest pain, claudication, cyanosis, irregular heartbeat, near-syncope, orthopnea, palpitations, paroxysmal nocturnal dyspnea and syncope.   Respiratory:  Negative for cough and shortness of breath.    Gastrointestinal:  Negative for heartburn and nausea.   Neurological:  Negative for dizziness, focal weakness, headaches, light-headedness and weakness.   All other systems reviewed and are negative.      Allergies   Allergen Reactions   • Imipramine Other (See Comments)   • Lisinopril Other (See Comments)   • Paroxetine Other (See Comments)   • Penicillins Hives   • Rosiglitazone Other (See Comments)   • Troglitazone Other (See Comments)     .    Current Outpatient Medications:   •  acetaminophen (TYLENOL) 325 mg tablet, Take 3 tablets (975 mg total) by mouth every 8 (eight) hours, Disp: , Rfl:   •  albuterol (2.5 mg/3 mL) 0.083 % nebulizer solution, Take 3 mL (2.5 mg total) by nebulization every 6 (six) hours as needed for wheezing or shortness of breath, Disp: , Rfl:   •  albuterol (PROVENTIL HFA,VENTOLIN HFA) 90 mcg/act inhaler, Inhale 2 puffs every 4 (four) hours as needed for wheezing, Disp: , Rfl:   •  apixaban (Eliquis) 5 mg, Take 1 tablet (5 mg total) by mouth 2 (two) times a day, Disp: 180 tablet, Rfl: 0  •  aspirin 81 mg chewable tablet, Chew 81 mg daily, Disp: , Rfl:   •  atorvastatin (LIPITOR) 10 mg tablet, Take 1 tablet by mouth daily, Disp: , Rfl:   •  calcitriol (ROCALTROL) 0.25 mcg capsule, Take 1 capsule (0.25 mcg total) by mouth daily Do not start before November 15, 2023., Disp: 30 capsule, Rfl: 0  •  Continuous Blood Gluc  (Dexcom G7 ) CYN, Use 1  each continuous, Disp: 1 each, Rfl: 0  •  Continuous Glucose Sensor (Dexcom G7 Sensor), Use 1 Device every 10 days, Disp: 9 each, Rfl: 1  •  cyanocobalamin (VITAMIN B-12) 100 MCG tablet, Take 1 tablet (100 mcg total) by mouth daily Do not start before November 15, 2023., Disp: 30 tablet, Rfl: 0  •  fludrocortisone (FLORINEF) 0.1 mg tablet, Take 2 tablets (0.2 mg total) by mouth daily, Disp: 60 tablet, Rfl: 5  •  furosemide (LASIX) 20 mg tablet, Take 1 tablet (20 mg total) by mouth daily, Disp: 30 tablet, Rfl: 5  •  hydrocortisone (CORTEF) 5 mg tablet, Use 4tabs (20mg) in morning and 3 tabs (15mg) daily afternoon Do not start before December 22, 2023., Disp: , Rfl:   •  insulin glargine (Toujeo Max SoloStar) 300 units/mL CONCENTRATED U-300 injection pen (2-unit dial), Inject 12 Units under the skin daily before breakfast, Disp: 6 mL, Rfl: 0  •  insulin lispro (HumALOG/ADMELOG) 100 units/mL injection, USE 5 UNITS BEFORE EACH MEAL PLUS 1 UNITS/60 ABOVE 150MG/DL MAX DOSE 40 UNITS/DAY, Disp: 30 mL, Rfl: 0  •  Insulin Pen Needle (BD Pen Needle Josie 2nd Gen) 32G X 4 MM MISC, FOR USE WITH INSULIN PEN. PHARMACY MAY DISPENSE BRAND COVERED BY INSURANCE., Disp: 100 each, Rfl: 5  •  Insulin Pen Needle (BD Pen Needle Josie 2nd Gen) 32G X 4 MM MISC, For use with insulin pen 4 times daily. Pharmacy may dispense brand covered by insurance., Disp: 200 each, Rfl: 1  •  midodrine (PROAMATINE) 10 MG tablet, One tablet TID with meals PRN .Hold for SBP under 140, Disp: , Rfl:   •  sertraline (ZOLOFT) 100 mg tablet, Take 100 mg by mouth daily, Disp: , Rfl:   •  ferrous sulfate 324 (65 Fe) mg, TAKE 1 TABLET (324 MG TOTAL) BY MOUTH DAILY BEFORE BREAKFAST (Patient not taking: Reported on 6/13/2024), Disp: 90 tablet, Rfl: 2  •  risperiDONE (RisperDAL) 1 mg tablet, Take 1 tablet by mouth 2 (two) times a day (Patient not taking: Reported on 6/13/2024), Disp: , Rfl:     Social History     Socioeconomic History   • Marital status: Single     Spouse  name: Not on file   • Number of children: Not on file   • Years of education: Not on file   • Highest education level: Not on file   Occupational History   • Not on file   Tobacco Use   • Smoking status: Former     Current packs/day: 0.25     Average packs/day: 0.3 packs/day for 30.0 years (7.5 ttl pk-yrs)     Types: Cigarettes   • Smokeless tobacco: Never   Vaping Use   • Vaping status: Never Used   Substance and Sexual Activity   • Alcohol use: Not Currently     Alcohol/week: 5.0 standard drinks of alcohol     Types: 5 Cans of beer per week     Comment: Quit in august 2022   • Drug use: Never   • Sexual activity: Not Currently   Other Topics Concern   • Not on file   Social History Narrative    ** Merged History Encounter **          Social Determinants of Health     Financial Resource Strain: Not on file   Food Insecurity: No Food Insecurity (3/9/2024)    Hunger Vital Sign    • Worried About Running Out of Food in the Last Year: Never true    • Ran Out of Food in the Last Year: Never true   Transportation Needs: No Transportation Needs (3/9/2024)    PRAPARE - Transportation    • Lack of Transportation (Medical): No    • Lack of Transportation (Non-Medical): No   Physical Activity: Not on file   Stress: Not on file   Social Connections: Not on file   Intimate Partner Violence: Not on file   Housing Stability: High Risk (3/9/2024)    Housing Stability Vital Sign    • Unable to Pay for Housing in the Last Year: No    • Number of Times Moved in the Last Year: 2    • Homeless in the Last Year: No       Family History   Problem Relation Age of Onset   • Heart disease Mother    • Cancer Father    • Multiple sclerosis Sister        Physical Exam  Vitals and nursing note reviewed.   Constitutional:       General: He is not in acute distress.  HENT:      Head: Normocephalic and atraumatic.   Eyes:      Extraocular Movements: Extraocular movements intact.      Conjunctiva/sclera: Conjunctivae normal.   Cardiovascular:       "Rate and Rhythm: Normal rate and regular rhythm.      Pulses: Intact distal pulses.      Heart sounds: Normal heart sounds.   Pulmonary:      Effort: Pulmonary effort is normal.      Breath sounds: Decreased breath sounds present.   Abdominal:      General: Bowel sounds are normal.      Palpations: Abdomen is soft.   Musculoskeletal:         General: Normal range of motion.      Cervical back: Normal range of motion and neck supple.      Right lower leg: Edema present.      Left lower leg: Edema present.   Skin:     General: Skin is warm and dry.   Neurological:      Mental Status: He is alert and oriented to person, place, and time.   Psychiatric:         Mood and Affect: Mood normal.         Vitals: Blood pressure 147/85, pulse 77, height 5' 4\" (1.626 m), weight 88.9 kg (196 lb), SpO2 95%.   Wt Readings from Last 3 Encounters:   06/13/24 88.9 kg (196 lb)   03/18/24 78.9 kg (174 lb)   03/08/24 81.8 kg (180 lb 4.8 oz)         Labs & Results:  Lab Results   Component Value Date    WBC 12.82 (H) 03/14/2024    HGB 12.1 03/14/2024    HCT 38.4 03/14/2024     (H) 03/14/2024     03/14/2024     BNP   Date Value Ref Range Status   12/19/2023 666 (H) 0 - 100 pg/mL Final     No components found for: \"CHEM\"    No results found for this or any previous visit.    No results found for this or any previous visit.        This note was completed in part utilizing [employer] direct voice recognition software.   Grammatical errors, random word insertion, spelling mistakes, and incomplete sentences may be an occasional consequence of the system secondary to software limitations, ambient noise and hardware issues. At the time of dictation, efforts were made to edit, clarify and /or correct errors.  Please read the chart carefully and recognize, using context, where substitutions have occurred.  If you have any questions or concerns about the context, text or information contained within the body of this dictation, " please contact myself, the provider, for further clarification

## 2024-06-13 NOTE — TELEPHONE ENCOUNTER
----- Message from NACHO Gonzalez sent at 6/13/2024  4:36 PM EDT -----  Labs reviewed potassium low at 2.9, magnesium also low at 1.7  Plan: Potassium 40 mEq twice daily, add magnesium oxide 400 twice daily  Repeat nonfasting labs Tuesday the 25th.  Please notify the patient

## 2024-06-14 ENCOUNTER — TELEPHONE (OUTPATIENT)
Dept: NEPHROLOGY | Facility: CLINIC | Age: 69
End: 2024-06-14

## 2024-06-14 NOTE — TELEPHONE ENCOUNTER
----- Message from NACHO Rodriguez sent at 6/14/2024 12:51 PM EDT -----  Please call patient let him know I reviewed his most recent lab work.  His renal function is stable and at his baseline as we discussed further at his upcoming appointment with Dr. Mosqueda

## 2024-06-17 ENCOUNTER — TELEPHONE (OUTPATIENT)
Dept: NEPHROLOGY | Facility: CLINIC | Age: 69
End: 2024-06-17

## 2024-06-17 NOTE — TELEPHONE ENCOUNTER
----- Message from NACHO Rodriguez sent at 6/14/2024 12:53 PM EDT -----  Can you please make a correction to my prior note for his lab work.  I saw his potassium level was low and that he was started on oral potassium supplementation.  I agree with his recommendations we will continue following

## 2024-06-17 NOTE — TELEPHONE ENCOUNTER
I spoke to Ofelia and made her aware that we agree with cardiology and okay to be on potassium supplement .

## 2024-06-27 ENCOUNTER — OFFICE VISIT (OUTPATIENT)
Dept: NEPHROLOGY | Facility: CLINIC | Age: 69
End: 2024-06-27
Payer: COMMERCIAL

## 2024-06-27 VITALS
DIASTOLIC BLOOD PRESSURE: 72 MMHG | HEIGHT: 64 IN | SYSTOLIC BLOOD PRESSURE: 126 MMHG | BODY MASS INDEX: 30.29 KG/M2 | WEIGHT: 177.4 LBS

## 2024-06-27 DIAGNOSIS — R60.0 LEG EDEMA: ICD-10-CM

## 2024-06-27 DIAGNOSIS — E55.9 VITAMIN D DEFICIENCY: ICD-10-CM

## 2024-06-27 DIAGNOSIS — I95.0 IDIOPATHIC HYPOTENSION: Primary | ICD-10-CM

## 2024-06-27 DIAGNOSIS — D50.9 IRON DEFICIENCY ANEMIA, UNSPECIFIED IRON DEFICIENCY ANEMIA TYPE: ICD-10-CM

## 2024-06-27 DIAGNOSIS — E83.42 HYPOMAGNESEMIA: ICD-10-CM

## 2024-06-27 DIAGNOSIS — E87.6 HYPOKALEMIA: ICD-10-CM

## 2024-06-27 DIAGNOSIS — D50.9 IRON DEFICIENCY ANEMIA, UNSPECIFIED: ICD-10-CM

## 2024-06-27 PROCEDURE — 99214 OFFICE O/P EST MOD 30 MIN: CPT | Performed by: INTERNAL MEDICINE

## 2024-06-27 RX ORDER — FUROSEMIDE 20 MG/1
20 TABLET ORAL 3 TIMES WEEKLY
Start: 2024-06-28

## 2024-06-27 RX ORDER — MULTIVIT-MIN/IRON/FOLIC ACID/K 18-600-40
1000 CAPSULE ORAL DAILY
COMMUNITY
End: 2024-06-27

## 2024-06-27 RX ORDER — FERROUS SULFATE 324(65)MG
324 TABLET, DELAYED RELEASE (ENTERIC COATED) ORAL
Qty: 90 TABLET | Refills: 2 | Status: SHIPPED | OUTPATIENT
Start: 2024-06-27

## 2024-06-27 RX ORDER — ERGOCALCIFEROL 1.25 MG/1
50000 CAPSULE ORAL WEEKLY
Qty: 12 CAPSULE | Refills: 0 | Status: SHIPPED | OUTPATIENT
Start: 2024-06-27

## 2024-06-27 NOTE — PROGRESS NOTES
OFFICE FOLLOW UP - Nephrology   Karson You 69 y.o. male MRN: [id number]         Interim HPI:   Karson You has a PMH of CKD II, CVA, DM II, anemia, PAD, obesity and returns today in the renal clinic for the continued management of the static hypotension. The patient was last seen on 6/1/2023 with Dr. Mosqueda and was stable from renal and BP standpoint.  Since the last visit, he has been seen in the ER with hypoglycemia along with confusion but secondary to low blood sugar.  Currently the patient has no complaints at this time and is feeling well. Patient denies any chest pain, shortness of breath or swelling. The last blood work was done on 6/13/2024  which we have reviewed together.        ASSESSMENT and PLAN:  There are no diagnoses linked to this encounter.    Orthostatic hypotension  Etiology: Suspect secondary to autonomic dysfunction in the setting of type 1 diabetes versus other etiologies  Currently remains on midodrine 15 mg p.o. 3 times daily, Florinef 0.2 mg daily, hydrocortisone 20 mg in a.m./15 mg in p.m.   -Due to recent episode of dizziness, advised to increase salt tablet from 1 g to 2 g p.o. 3 times daily for next few weeks.  If his symptoms are overall better, he can eventually reduce back to current dose of 1 g p.o. 3 times daily.  -Noted discussion regarding consideration of Northera as per cardiology/neurology  -Use compression stockings, abdominal binders daily  -Discussed slowly standing up to avoid symptomatic episodes.  -His home BP readings were 120s/50s up until last week when he started feeling off-and-on dizzy with BP 90s SBP.   -Continue to monitor BP closely at home.  -Advised to call back if his symptoms are not improving despite increasing salt tablet.     Renal function overall stable with baseline serum creatinine 0.9-1.2  -mild CKD component could be due to long-term type 1 diabetes  -Last creatinine 1.1 overall stable  -UA in January 2023 shows no significant hematuria, no  proteinuria.  -UACR nonsignificant, UPC ratio 180 mg in May 2023.     Elevated free light chain ratio in the setting of anemia  -FLC ratio 2.0, SPEP, UPEP negative  -Further evaluation as per hematology, has upcoming appointment later this month     Iron deficiency anemia, iron saturation 13% in May 2023, hemoglobin 10.5.  -We will start ferrous sulfate 1 tablet daily.  Also has upcoming hematology appointment.                 Age related screening:   Your primary caregiver may do yearly screening for colorectal cancer. It is recommended in all men and women over 45 years old. You may have screening earlier if you have colon disease or a family history of colorectal cancer.       There are no Patient Instructions on file for this visit.          ROS:   Review of Systems     Allergies: Imipramine, Lisinopril, Paroxetine, Penicillins, Rosiglitazone, and Troglitazone    Medications:   Current Outpatient Medications:     acetaminophen (TYLENOL) 325 mg tablet, Take 3 tablets (975 mg total) by mouth every 8 (eight) hours, Disp: , Rfl:     albuterol (2.5 mg/3 mL) 0.083 % nebulizer solution, Take 3 mL (2.5 mg total) by nebulization every 6 (six) hours as needed for wheezing or shortness of breath, Disp: , Rfl:     albuterol (PROVENTIL HFA,VENTOLIN HFA) 90 mcg/act inhaler, Inhale 2 puffs every 4 (four) hours as needed for wheezing, Disp: , Rfl:     apixaban (Eliquis) 5 mg, Take 1 tablet (5 mg total) by mouth 2 (two) times a day, Disp: 180 tablet, Rfl: 0    aspirin 81 mg chewable tablet, Chew 81 mg daily, Disp: , Rfl:     atorvastatin (LIPITOR) 10 mg tablet, Take 1 tablet by mouth daily, Disp: , Rfl:     calcitriol (ROCALTROL) 0.25 mcg capsule, Take 1 capsule (0.25 mcg total) by mouth daily Do not start before November 15, 2023., Disp: 30 capsule, Rfl: 0    Continuous Blood Gluc  (Dexcom G7 ) CYN, Use 1 each continuous, Disp: 1 each, Rfl: 0    Continuous Glucose Sensor (Dexcom G7 Sensor), Use 1 Device every  10 days, Disp: 9 each, Rfl: 1    cyanocobalamin (VITAMIN B-12) 100 MCG tablet, Take 1 tablet (100 mcg total) by mouth daily Do not start before November 15, 2023., Disp: 30 tablet, Rfl: 0    ferrous sulfate 324 (65 Fe) mg, TAKE 1 TABLET (324 MG TOTAL) BY MOUTH DAILY BEFORE BREAKFAST (Patient not taking: Reported on 6/13/2024), Disp: 90 tablet, Rfl: 2    fludrocortisone (FLORINEF) 0.1 mg tablet, Take 2 tablets (0.2 mg total) by mouth daily, Disp: 60 tablet, Rfl: 5    furosemide (LASIX) 20 mg tablet, Take 1 tablet (20 mg total) by mouth daily, Disp: 30 tablet, Rfl: 5    hydrocortisone (CORTEF) 5 mg tablet, Use 4tabs (20mg) in morning and 3 tabs (15mg) daily afternoon Do not start before December 22, 2023., Disp: , Rfl:     insulin glargine (Toujeo Max SoloStar) 300 units/mL CONCENTRATED U-300 injection pen (2-unit dial), Inject 12 Units under the skin daily before breakfast, Disp: 6 mL, Rfl: 0    insulin lispro (HumALOG/ADMELOG) 100 units/mL injection, USE 5 UNITS BEFORE EACH MEAL PLUS 1 UNITS/60 ABOVE 150MG/DL MAX DOSE 40 UNITS/DAY, Disp: 30 mL, Rfl: 0    Insulin Pen Needle (BD Pen Needle Josie 2nd Gen) 32G X 4 MM MISC, FOR USE WITH INSULIN PEN. PHARMACY MAY DISPENSE BRAND COVERED BY INSURANCE., Disp: 100 each, Rfl: 5    Insulin Pen Needle (BD Pen Needle Josie 2nd Gen) 32G X 4 MM MISC, For use with insulin pen 4 times daily. Pharmacy may dispense brand covered by insurance., Disp: 200 each, Rfl: 1    Magnesium Oxide 400 MG CAPS, Take 1 tablet (400 mg total) by mouth 2 (two) times a day, Disp: 180 tablet, Rfl: 3    midodrine (PROAMATINE) 10 MG tablet, One tablet TID with meals PRN .Hold for SBP under 140, Disp: , Rfl:     potassium chloride (Klor-Con M20) 20 mEq tablet, Take 2 tablets (40 mEq total) by mouth 2 (two) times a day, Disp: 120 tablet, Rfl: 3    risperiDONE (RisperDAL) 1 mg tablet, Take 1 tablet by mouth 2 (two) times a day (Patient not taking: Reported on 6/13/2024), Disp: , Rfl:     sertraline (ZOLOFT) 100  "mg tablet, Take 100 mg by mouth daily, Disp: , Rfl:     Past Medical History:   Diagnosis Date    A-fib (HCC)     Adrenal insufficiency (HCC)     Atrial fibrillation (HCC)     Diabetes mellitus (HCC)     History of stroke 10/19/2022    Obesity, morbid (HCC) 11/14/2022    Shock (HCC) 12/14/2023    Stroke (HCC)     Type 2 MI (myocardial infarction) (HCC) 11/03/2022     No past surgical history on file.  Family History   Problem Relation Age of Onset    Heart disease Mother     Cancer Father     Multiple sclerosis Sister       reports that he has quit smoking. His smoking use included cigarettes. He has a 7.5 pack-year smoking history. He has never used smokeless tobacco. He reports that he does not currently use alcohol after a past usage of about 5.0 standard drinks of alcohol per week. He reports that he does not use drugs.      Physical Exam:   There were no vitals filed for this visit.  There is no height or weight on file to calculate BMI.  Physical Exam       Lab Results:              Portions of the record may have been created with voice recognition software. Occasional wrong word or \"sound a like\" substitutions may have occurred due to the inherent limitations of voice recognition software. Read the chart carefully and recognize,   "

## 2024-06-27 NOTE — PROGRESS NOTES
NEPHROLOGY OUTPATIENT PROGRESS NOTE   Karson You 69 y.o. male MRN: [id number]  DATE: 6/27/2024  Reason for visit:   Chief Complaint   Patient presents with    Follow-up    Hypertension     ASSESSMENT and PLAN:  Orthostatic hypotension  -Suspect secondary to autonomic dysfunction in the setting of type 1 diabetes versus other etiologies  -Due to prior orthostatic hypotension, patient was on midodrine, Florinef, hydrocortisone, salt tablets.  -In the recent past he has been having significantly uncontrolled hypertension issues as per patient and his daughter and due to same, recently midodrine was discontinued and changed to as needed dosing, salt tablet was discontinued couple weeks ago.  -Since staying off midodrine and salt tablet, blood pressure still overall fluctuating from 120s to 170s SBP at home.  -Today blood pressure well-controlled in the office today.  Patient denies any lightheadedness or dizziness.  -If blood pressure remains significantly uncontrolled at home, advised to call back in which case consider reducing Florinef  --Continue to monitor BP closely at home.     Renal function overall stable with baseline serum creatinine 0.9-1.2  -mild CKD component could be due to long-term type 1 diabetes  -Last creatinine 1.2 overall stable.  -UA in March 2024 without any proteinuria or hematuria.  UACR nonsignificant      Elevated free light chain ratio in the setting of anemia  -FLC ratio 2.0, SPEP, UPEP negative  -Recommend to follow-up with hematology.  Patient will be calling them to get appointment.    Recent weight gain, leg edema issues  -Suspected secondary to use of salt tablet, Florinef, hydrocortisone.  -Echo in December 2023 shows EF 60%, normal diastolic function.  -Now remains off salt tablet, patient was started on Lasix 20 mg daily during recent cardiology visit when his weight was 196 pounds and since then it has much improved most recent today 177 pounds.  -Will decrease Lasix to 20 mg 3  times a week.    Hypokalemia  -Suspect in the setting of use of Florinef, recently using Lasix, hypomagnesemia related renal loss.   -Most recent serum potassium 2.9 on 6/13/2024  -Currently on potassium chloride 40 meq twice daily, continue same.  Repeat serum potassium next week.   -If persistent hypokalemia despite aggressive replacement, consider reducing Florinef    Hypomagnesemia, serum magnesium 1.7, now remains on magnesium supplement which was recently started.  Check serum magnesium next week.       Iron deficiency anemia, iron saturation 12% in June 2024.  hemoglobin 10.9.  -Restart ferrous sulfate 1 tablet daily.  Recommend to follow-up with hematology.    Vitamin D deficiency, last vitamin D 14 in March 2024.  Start vitamin D 50,000 units weekly for 12 weeks.    Diagnoses and all orders for this visit:    Vitamin D deficiency  -     ergocalciferol (VITAMIN D2) 50,000 units; Take 1 capsule (50,000 Units total) by mouth once a week  -     PTH, intact; Future  -     Vitamin D 25 hydroxy; Future    Iron deficiency anemia, unspecified  -     ferrous sulfate 324 (65 Fe) mg; Take 1 tablet (324 mg total) by mouth daily before breakfast    Hypokalemia  -     Potassium; Future  -     Basic metabolic panel; Future    Hypomagnesemia  -     Potassium; Future  -     Magnesium; Future  -     Basic metabolic panel; Future  -     Magnesium; Future    Iron deficiency anemia, unspecified iron deficiency anemia type  -     Ferritin; Future  -     TIBC Panel (incl. Iron, TIBC, % Iron Saturation); Future  -     CBC; Future    Idiopathic hypotension    Leg edema  -     furosemide (LASIX) 20 mg tablet; Take 1 tablet (20 mg total) by mouth 3 (three) times a week    Other orders  -     Discontinue: Vitamin D, Cholecalciferol, 25 MCG (1000 UT) TABS; Take 1,000 Units by mouth in the morning          SUBJECTIVE / HPI:  Patient is 69-year-old male with significant medical issues of type 1 diabetes diagnosed since age of 39, A. fib,  "adrenal insufficiency, CVA, orthostatic hypotension, has regular follow-up of blood pressure issues.  Patient was earlier diagnosed with symptomatic orthostatic hypotension requiring salt tablet, midodrine, Florinef, hydrocortisone.  More recently he has been having uncontrolled hypertension issues and during cardiology office visit recently, salt tablet, midodrine were discontinued.     His blood pressure seems to be fluctuating with SBP 120s to 170s over last 2 weeks.  Blood pressure well-controlled in the office today.  He denies any lightheadedness or dizziness.  Daughter is present during office visit.      Patient currently denies any nausea, vomiting, chest pain or shortness of breath.  Denies any urinary complaint.    REVIEW OF SYSTEMS:  More than 10 point review of systems were obtained and discussed in length with the patient. Complete review of systems were negative / unremarkable except mentioned above.     PHYSICAL EXAM:  Vitals:    06/27/24 1533 06/27/24 1548   BP: 102/60 126/72   BP Location: Left arm    Patient Position: Sitting    Cuff Size: Large    Weight: 80.5 kg (177 lb 6.4 oz)    Height: 5' 4\" (1.626 m)      Body mass index is 30.45 kg/m².    Physical Exam  Vitals reviewed.   Constitutional:       Appearance: He is well-developed.   HENT:      Head: Normocephalic and atraumatic.      Right Ear: External ear normal.      Left Ear: External ear normal.   Eyes:      General: No scleral icterus.     Conjunctiva/sclera: Conjunctivae normal.   Cardiovascular:      Comments: No significant edema in legs  Pulmonary:      Effort: Pulmonary effort is normal. No respiratory distress.      Breath sounds: Normal breath sounds. No wheezing or rales.   Abdominal:      General: Bowel sounds are normal. There is no distension.      Palpations: Abdomen is soft.      Tenderness: There is no abdominal tenderness.   Musculoskeletal:         General: No deformity.   Lymphadenopathy:      Cervical: No cervical " adenopathy.   Skin:     Findings: No rash.   Neurological:      Mental Status: He is alert and oriented to person, place, and time.   Psychiatric:         Behavior: Behavior normal.         PAST MEDICAL HISTORY:  Past Medical History:   Diagnosis Date    A-fib (HCC)     Adrenal insufficiency (HCC)     Atrial fibrillation (HCC)     Diabetes mellitus (HCC)     History of stroke 10/19/2022    Obesity, morbid (HCC) 11/14/2022    Shock (HCC) 12/14/2023    Stroke (HCC)     Type 2 MI (myocardial infarction) (HCC) 11/03/2022       PAST SURGICAL HISTORY:  No past surgical history on file.    SOCIAL HISTORY:  Social History     Substance and Sexual Activity   Alcohol Use Not Currently    Alcohol/week: 5.0 standard drinks of alcohol    Types: 5 Cans of beer per week    Comment: Quit in august 2022     Social History     Substance and Sexual Activity   Drug Use Never     Social History     Tobacco Use   Smoking Status Former    Current packs/day: 0.25    Average packs/day: 0.3 packs/day for 30.0 years (7.5 ttl pk-yrs)    Types: Cigarettes   Smokeless Tobacco Never       FAMILY HISTORY:  Family History   Problem Relation Age of Onset    Heart disease Mother     Cancer Father     Multiple sclerosis Sister        MEDICATIONS:    Current Outpatient Medications:     acetaminophen (TYLENOL) 325 mg tablet, Take 3 tablets (975 mg total) by mouth every 8 (eight) hours, Disp: , Rfl:     albuterol (2.5 mg/3 mL) 0.083 % nebulizer solution, Take 3 mL (2.5 mg total) by nebulization every 6 (six) hours as needed for wheezing or shortness of breath, Disp: , Rfl:     albuterol (PROVENTIL HFA,VENTOLIN HFA) 90 mcg/act inhaler, Inhale 2 puffs every 4 (four) hours as needed for wheezing, Disp: , Rfl:     apixaban (Eliquis) 5 mg, Take 1 tablet (5 mg total) by mouth 2 (two) times a day, Disp: 180 tablet, Rfl: 0    aspirin 81 mg chewable tablet, Chew 81 mg daily, Disp: , Rfl:     atorvastatin (LIPITOR) 10 mg tablet, Take 1 tablet by mouth daily, Disp:  , Rfl:     Continuous Blood Gluc  (Dexcom G7 ) CYN, Use 1 each continuous, Disp: 1 each, Rfl: 0    Continuous Glucose Sensor (Dexcom G7 Sensor), Use 1 Device every 10 days, Disp: 9 each, Rfl: 1    cyanocobalamin (VITAMIN B-12) 100 MCG tablet, Take 1 tablet (100 mcg total) by mouth daily Do not start before November 15, 2023., Disp: 30 tablet, Rfl: 0    ergocalciferol (VITAMIN D2) 50,000 units, Take 1 capsule (50,000 Units total) by mouth once a week, Disp: 12 capsule, Rfl: 0    ferrous sulfate 324 (65 Fe) mg, Take 1 tablet (324 mg total) by mouth daily before breakfast, Disp: 90 tablet, Rfl: 2    fludrocortisone (FLORINEF) 0.1 mg tablet, Take 2 tablets (0.2 mg total) by mouth daily, Disp: 60 tablet, Rfl: 5    [START ON 6/28/2024] furosemide (LASIX) 20 mg tablet, Take 1 tablet (20 mg total) by mouth 3 (three) times a week, Disp: , Rfl:     hydrocortisone (CORTEF) 5 mg tablet, Use 4tabs (20mg) in morning and 3 tabs (15mg) daily afternoon Do not start before December 22, 2023., Disp: , Rfl:     insulin glargine (Toujeo Max SoloStar) 300 units/mL CONCENTRATED U-300 injection pen (2-unit dial), Inject 12 Units under the skin daily before breakfast, Disp: 6 mL, Rfl: 0    insulin lispro (HumALOG/ADMELOG) 100 units/mL injection, USE 5 UNITS BEFORE EACH MEAL PLUS 1 UNITS/60 ABOVE 150MG/DL MAX DOSE 40 UNITS/DAY, Disp: 30 mL, Rfl: 0    Insulin Pen Needle (BD Pen Needle Josie 2nd Gen) 32G X 4 MM MISC, FOR USE WITH INSULIN PEN. PHARMACY MAY DISPENSE BRAND COVERED BY INSURANCE., Disp: 100 each, Rfl: 5    Insulin Pen Needle (BD Pen Needle Josie 2nd Gen) 32G X 4 MM MISC, For use with insulin pen 4 times daily. Pharmacy may dispense brand covered by insurance., Disp: 200 each, Rfl: 1    Magnesium Oxide 400 MG CAPS, Take 1 tablet (400 mg total) by mouth 2 (two) times a day, Disp: 180 tablet, Rfl: 3    midodrine (PROAMATINE) 10 MG tablet, One tablet TID with meals PRN .Hold for SBP under 140, Disp: , Rfl:     potassium  chloride (Klor-Con M20) 20 mEq tablet, Take 2 tablets (40 mEq total) by mouth 2 (two) times a day, Disp: 120 tablet, Rfl: 3    sertraline (ZOLOFT) 100 mg tablet, Take 100 mg by mouth daily, Disp: , Rfl:     risperiDONE (RisperDAL) 1 mg tablet, Take 1 tablet by mouth 2 (two) times a day (Patient not taking: Reported on 6/13/2024), Disp: , Rfl:     Lab Results:   Results for orders placed or performed in visit on 06/13/24   Magnesium   Result Value Ref Range    Magnesium 1.7 (L) 1.9 - 2.7 mg/dL   Hemoglobin A1C   Result Value Ref Range    Hemoglobin A1C 7.0 (H) Normal 4.0-5.6%; PreDiabetic 5.7-6.4%; Diabetic >=6.5%; Glycemic control for adults with diabetes <7.0% %     mg/dl   B-Type Natriuretic Peptide(BNP)   Result Value Ref Range     (H) 0 - 100 pg/mL

## 2024-07-06 DIAGNOSIS — E87.6 HYPOKALEMIA: ICD-10-CM

## 2024-07-07 RX ORDER — POTASSIUM CHLORIDE 1500 MG/1
40 TABLET, EXTENDED RELEASE ORAL 2 TIMES DAILY
Qty: 360 TABLET | Refills: 1 | Status: SHIPPED | OUTPATIENT
Start: 2024-07-07

## 2024-07-11 ENCOUNTER — TELEPHONE (OUTPATIENT)
Age: 69
End: 2024-07-11

## 2024-07-11 NOTE — TELEPHONE ENCOUNTER
LVM for patient's daughter to let her know Dr. Reza gave the okay for a virtual visit. Offered appt opening today 7/11/24 at 3:40.     If unable to schedule today, please schedule for next available appt and place on waitlist.

## 2024-07-11 NOTE — TELEPHONE ENCOUNTER
Ofelia is requesting call back. Patient's daughter is requesting a virtual appt with Dr. Reza or any [facility] ASAP. Call back # [phone]

## 2024-07-18 DIAGNOSIS — R60.0 LEG EDEMA: ICD-10-CM

## 2024-07-19 RX ORDER — FUROSEMIDE 20 MG/1
20 TABLET ORAL 3 TIMES WEEKLY
Qty: 36 TABLET | Refills: 0 | Status: SHIPPED | OUTPATIENT
Start: 2024-07-19

## 2024-07-23 DIAGNOSIS — E10.65 TYPE 1 DIABETES MELLITUS WITH HYPERGLYCEMIA (HCC): ICD-10-CM

## 2024-07-23 RX ORDER — INSULIN GLARGINE 300 U/ML
12 INJECTION, SOLUTION SUBCUTANEOUS
Qty: 6 ML | Refills: 1 | Status: SHIPPED | OUTPATIENT
Start: 2024-07-23

## 2024-08-15 DIAGNOSIS — E27.40 ADRENAL INSUFFICIENCY (HCC): ICD-10-CM

## 2024-08-16 RX ORDER — HYDROCORTISONE 5 MG/1
TABLET ORAL
Qty: 210 TABLET | Refills: 6 | Status: SHIPPED | OUTPATIENT
Start: 2024-08-16

## 2024-08-27 ENCOUNTER — HOSPITAL ENCOUNTER (EMERGENCY)
Facility: HOSPITAL | Age: 69
Discharge: HOME | End: 2024-08-27
Attending: EMERGENCY MEDICINE
Payer: COMMERCIAL

## 2024-08-27 VITALS
DIASTOLIC BLOOD PRESSURE: 83 MMHG | RESPIRATION RATE: 18 BRPM | OXYGEN SATURATION: 99 % | HEART RATE: 96 BPM | SYSTOLIC BLOOD PRESSURE: 163 MMHG | TEMPERATURE: 98 F

## 2024-08-27 DIAGNOSIS — R73.9 HYPERGLYCEMIA: Primary | ICD-10-CM

## 2024-08-27 LAB
ANION GAP SERPL CALCULATED.3IONS-SCNC: 5 MMOL/L (ref 4–13)
ATRIAL RATE: 70 BPM
B-OH-BUTYR SERPL-MCNC: 0.07 MMOL/L (ref 0.02–0.27)
BACTERIA UR QL AUTO: NORMAL /HPF
BASE EX.OXY STD BLDV CALC-SCNC: 30.1 % (ref 60–80)
BASE EXCESS BLDV CALC-SCNC: -2.3 MMOL/L
BASOPHILS # BLD AUTO: 0.02 THOUSANDS/ÂΜL (ref 0–0.1)
BASOPHILS NFR BLD AUTO: 0 % (ref 0–1)
BILIRUB UR QL STRIP: NEGATIVE
BUN SERPL-MCNC: 24 MG/DL (ref 5–25)
CALCIUM SERPL-MCNC: 8.4 MG/DL (ref 8.4–10.2)
CHLORIDE SERPL-SCNC: 98 MMOL/L (ref 96–108)
CLARITY UR: CLEAR
CO2 SERPL-SCNC: 27 MMOL/L (ref 21–32)
COLOR UR: COLORLESS
CREAT SERPL-MCNC: 1.22 MG/DL (ref 0.6–1.3)
EOSINOPHIL # BLD AUTO: 0.04 THOUSAND/ÂΜL (ref 0–0.61)
EOSINOPHIL NFR BLD AUTO: 1 % (ref 0–6)
ERYTHROCYTE [DISTWIDTH] IN BLOOD BY AUTOMATED COUNT: 15.5 % (ref 11.6–15.1)
GFR SERPL CREATININE-BSD FRML MDRD: 60 ML/MIN/1.73SQ M
GLUCOSE SERPL-MCNC: 376 MG/DL (ref 65–140)
GLUCOSE SERPL-MCNC: 393 MG/DL (ref 65–140)
GLUCOSE SERPL-MCNC: 422 MG/DL (ref 65–140)
GLUCOSE UR STRIP-MCNC: ABNORMAL MG/DL
HCO3 BLDV-SCNC: 24.6 MMOL/L (ref 24–30)
HCT VFR BLD AUTO: 34.7 % (ref 36.5–49.3)
HGB BLD-MCNC: 11.6 G/DL (ref 12–17)
HGB UR QL STRIP.AUTO: NEGATIVE
IMM GRANULOCYTES # BLD AUTO: 0.04 THOUSAND/UL (ref 0–0.2)
IMM GRANULOCYTES NFR BLD AUTO: 1 % (ref 0–2)
KETONES UR STRIP-MCNC: NEGATIVE MG/DL
LEUKOCYTE ESTERASE UR QL STRIP: ABNORMAL
LYMPHOCYTES # BLD AUTO: 0.93 THOUSANDS/ÂΜL (ref 0.6–4.47)
LYMPHOCYTES NFR BLD AUTO: 12 % (ref 14–44)
MAGNESIUM SERPL-MCNC: 1.9 MG/DL (ref 1.9–2.7)
MCH RBC QN AUTO: 31.2 PG (ref 26.8–34.3)
MCHC RBC AUTO-ENTMCNC: 33.4 G/DL (ref 31.4–37.4)
MCV RBC AUTO: 93 FL (ref 82–98)
MONOCYTES # BLD AUTO: 0.91 THOUSAND/ÂΜL (ref 0.17–1.22)
MONOCYTES NFR BLD AUTO: 12 % (ref 4–12)
NEUTROPHILS # BLD AUTO: 5.85 THOUSANDS/ÂΜL (ref 1.85–7.62)
NEUTS SEG NFR BLD AUTO: 74 % (ref 43–75)
NITRITE UR QL STRIP: NEGATIVE
NON-SQ EPI CELLS URNS QL MICRO: NORMAL /HPF
NRBC BLD AUTO-RTO: 0 /100 WBCS
O2 CT BLDV-SCNC: 4.7 ML/DL
P AXIS: 107 DEGREES
PCO2 BLDV: 51.8 MM HG (ref 42–50)
PH BLDV: 7.29 [PH] (ref 7.3–7.4)
PH UR STRIP.AUTO: 5 [PH]
PHOSPHATE SERPL-MCNC: 3.9 MG/DL (ref 2.3–4.1)
PLATELET # BLD AUTO: 151 THOUSANDS/UL (ref 149–390)
PMV BLD AUTO: 10.9 FL (ref 8.9–12.7)
PO2 BLDV: 19.4 MM HG (ref 35–45)
POTASSIUM SERPL-SCNC: 4.9 MMOL/L (ref 3.5–5.3)
PR INTERVAL: 176 MS
PROT UR STRIP-MCNC: NEGATIVE MG/DL
QRS AXIS: 50 DEGREES
QRSD INTERVAL: 70 MS
QT INTERVAL: 402 MS
QTC INTERVAL: 434 MS
RBC # BLD AUTO: 3.72 MILLION/UL (ref 3.88–5.62)
RBC #/AREA URNS AUTO: NORMAL /HPF
SODIUM SERPL-SCNC: 130 MMOL/L (ref 135–147)
SP GR UR STRIP.AUTO: 1.01 (ref 1–1.03)
T WAVE AXIS: -67 DEGREES
UROBILINOGEN UR STRIP-ACNC: <2 MG/DL
VENTRICULAR RATE: 70 BPM
WBC # BLD AUTO: 7.79 THOUSAND/UL (ref 4.31–10.16)
WBC #/AREA URNS AUTO: NORMAL /HPF

## 2024-08-27 PROCEDURE — 82805 BLOOD GASES W/O2 SATURATION: CPT

## 2024-08-27 PROCEDURE — 84100 ASSAY OF PHOSPHORUS: CPT

## 2024-08-27 PROCEDURE — 82948 REAGENT STRIP/BLOOD GLUCOSE: CPT

## 2024-08-27 PROCEDURE — 80048 BASIC METABOLIC PNL TOTAL CA: CPT

## 2024-08-27 PROCEDURE — 81001 URINALYSIS AUTO W/SCOPE: CPT

## 2024-08-27 PROCEDURE — 93010 ELECTROCARDIOGRAM REPORT: CPT | Performed by: INTERNAL MEDICINE

## 2024-08-27 PROCEDURE — 96366 THER/PROPH/DIAG IV INF ADDON: CPT

## 2024-08-27 PROCEDURE — 93005 ELECTROCARDIOGRAM TRACING: CPT

## 2024-08-27 PROCEDURE — 36415 COLL VENOUS BLD VENIPUNCTURE: CPT

## 2024-08-27 PROCEDURE — 85025 COMPLETE CBC W/AUTO DIFF WBC: CPT

## 2024-08-27 PROCEDURE — 96365 THER/PROPH/DIAG IV INF INIT: CPT

## 2024-08-27 PROCEDURE — 82010 KETONE BODYS QUAN: CPT

## 2024-08-27 PROCEDURE — 99285 EMERGENCY DEPT VISIT HI MDM: CPT | Performed by: EMERGENCY MEDICINE

## 2024-08-27 PROCEDURE — 83735 ASSAY OF MAGNESIUM: CPT

## 2024-08-27 PROCEDURE — 99284 EMERGENCY DEPT VISIT MOD MDM: CPT

## 2024-08-27 RX ORDER — SODIUM CHLORIDE, SODIUM GLUCONATE, SODIUM ACETATE, POTASSIUM CHLORIDE, MAGNESIUM CHLORIDE, SODIUM PHOSPHATE, DIBASIC, AND POTASSIUM PHOSPHATE .53; .5; .37; .037; .03; .012; .00082 G/100ML; G/100ML; G/100ML; G/100ML; G/100ML; G/100ML; G/100ML
1000 INJECTION, SOLUTION INTRAVENOUS ONCE
Status: COMPLETED | OUTPATIENT
Start: 2024-08-27 | End: 2024-08-27

## 2024-08-27 RX ADMIN — SODIUM CHLORIDE, SODIUM GLUCONATE, SODIUM ACETATE, POTASSIUM CHLORIDE, MAGNESIUM CHLORIDE, SODIUM PHOSPHATE, DIBASIC, AND POTASSIUM PHOSPHATE 1000 ML: .53; .5; .37; .037; .03; .012; .00082 INJECTION, SOLUTION INTRAVENOUS at 17:00

## 2024-08-27 NOTE — ED ATTENDING ATTESTATION
Final Diagnoses:     1. Hyperglycemia      ED Course as of 08/28/24 1417   Tue Aug 27, 2024   1919 pH, Darvin(!): 7.294   1919 Base Excess, Darvin: -2.3   1919 pCO2, Darvin(!): 51.8   1919 Beta- Hydroxybutyrate: 0.07   1919 Nitrite, UA: Negative   1919 Leukocytes, UA(!): Elevated glucose may cause decreased leukocyte values. See urine microscopic for UWBC result   1926 Bacteria, UA: None Seen       I, Dixon Cornejo MD, saw and evaluated the patient. All available labs and X-rays were ordered by me or the resident / non-physician and have been reviewed by myself. I discussed the patient with the resident / non-physician and agree with the resident's / non-physician practitioner's findings and plan as documented in the resident's / non-physician practicitioner's note, except where noted.   At this point, I agree with the current assessment done in the ED.   I was present during key portions of all procedures performed unless otherwise stated.     HPI:  NURSING TRIAGE:    This is a 69 y.o. male presenting for evaluation of primarily hyperglycemia without symptoms.   400s in ED  This morning, had values in the 600s.  Yesterday afternoon, saw a reading from Dexcom in 300s. Did same insulin regimen in the ED.   Trended levels and continued to have elevated levels.   In the morning, didn't eat much.  580 this morning.  No symptoms of this though.  +urination on self per daughter, but this isn't rare.   No nausea/vomiting  No belly pain  No f/ch. Otherwise in normal state of health.   No CP/SOB  25U of insulin today.   On Humalog + Glargine  On Hydrocortisone and daughter gave extra doses of it today b/c of the high sugars. She has written notes telling her to do this.    PMH: DM1 Chief Complaint   Patient presents with    Hyperglycemia - no symptoms     Pt DM1, glucose monitor at 1300 today read glucose greater than 600mg/dl, blood glucose in triage 422mg/dl, asymptomatic at this time, reports taking 8 units units and sliding  scale with meals and long acting at night      PHYSICAL: ASSESSMENT + PLAN:   Pertinent: MMM  No belly tenderness    General: VS reviewed  Appears in NAD  awake, alert.   Well-nourished, well-developed. Appears stated age.   Speaking normally in full sentences.   Head: Normocephalic, atraumatic  Eyes: EOM-I. No diplopia.   No hyphema.   No subconjunctival hemorrhages.  Symmetrical lids.   ENT: Atraumatic external nose and ears.    MMM  No malocclusion. No stridor. Normal phonation. No drooling. Normal swallowing.   Neck: No JVD.  CV: No pallor noted  Lungs:   No tachypnea  No respiratory distress  Abd: soft nt nd no rebound/guarding  MSK:   FROM spontaneously  Skin: Dry, intact.   Neuro: Awake, alert, GCS15, CN II-XII grossly intact.   Motor grossly intact.  Psychiatric/Behavioral: interacting normally; appropriate mood/affect.    Exam: deferred    Vitals:    08/27/24 1448 08/27/24 1802   BP: 115/68 163/83   BP Location: Right arm    Pulse: 68 96   Resp: 18 18   Temp: 98 °F (36.7 °C)    TempSrc: Tympanic    SpO2: 99%     Recheck labs  R/o DKA.  R/o UTI  No URTI ? CXR lower yield.  Fluids in interim  Suspect DC home ? suspect also steroid induced.      There are no obvious limitations to social determinants of care.   Nursing note reviewed.   Vitals reviewed.   Orders placed by myself and/or advanced practitioner / resident.    Previous chart was reviewed  No language barrier.   History obtained from daughter, patient.    There are no limitations to the history obtained:     Past Medical: Past Surgical:    has a past medical history of A-fib (HCC), Adrenal insufficiency (HCC), Atrial fibrillation (HCC), Diabetes mellitus (HCC), History of stroke (10/19/2022), Obesity, morbid (HCC) (11/14/2022), Shock (HCC) (12/14/2023), Stroke (HCC), and Type 2 MI (myocardial infarction) ([facility]) (11/03/2022).  has no past surgical history on file.   Social: Cardiac (Echo/Cath)   Social History     Substance and Sexual Activity    Alcohol Use Not Currently    Alcohol/week: 5.0 standard drinks of alcohol    Types: 5 Cans of beer per week    Comment: Quit in august 2022     Social History     Tobacco Use   Smoking Status Former    Current packs/day: 0.25    Average packs/day: 0.3 packs/day for 30.0 years (7.5 ttl pk-yrs)    Types: Cigarettes   Smokeless Tobacco Never     Social History     Substance and Sexual Activity   Drug Use Never    No results found for this or any previous visit.    No results found for this or any previous visit.    No results found for this or any previous visit.     Labs: Imaging:   Labs Reviewed   CBC AND DIFFERENTIAL - Abnormal       Result Value Ref Range Status    WBC 7.79  4.31 - 10.16 Thousand/uL Final    RBC 3.72 (*) 3.88 - 5.62 Million/uL Final    Hemoglobin 11.6 (*) 12.0 - 17.0 g/dL Final    Hematocrit 34.7 (*) 36.5 - 49.3 % Final    MCV 93  82 - 98 fL Final    MCH 31.2  26.8 - 34.3 pg Final    MCHC 33.4  31.4 - 37.4 g/dL Final    RDW 15.5 (*) 11.6 - 15.1 % Final    MPV 10.9  8.9 - 12.7 fL Final    Platelets 151  149 - 390 Thousands/uL Final    nRBC 0  /100 WBCs Final    Segmented % 74  43 - 75 % Final    Immature Grans % 1  0 - 2 % Final    Lymphocytes % 12 (*) 14 - 44 % Final    Monocytes % 12  4 - 12 % Final    Eosinophils Relative 1  0 - 6 % Final    Basophils Relative 0  0 - 1 % Final    Absolute Neutrophils 5.85  1.85 - 7.62 Thousands/µL Final    Absolute Immature Grans 0.04  0.00 - 0.20 Thousand/uL Final    Absolute Lymphocytes 0.93  0.60 - 4.47 Thousands/µL Final    Absolute Monocytes 0.91  0.17 - 1.22 Thousand/µL Final    Eosinophils Absolute 0.04  0.00 - 0.61 Thousand/µL Final    Basophils Absolute 0.02  0.00 - 0.10 Thousands/µL Final   BASIC METABOLIC PANEL - Abnormal    Sodium 130 (*) 135 - 147 mmol/L Final    Potassium 4.9  3.5 - 5.3 mmol/L Final    Chloride 98  96 - 108 mmol/L Final    CO2 27  21 - 32 mmol/L Final    ANION GAP 5  4 - 13 mmol/L Final    BUN 24  5 - 25 mg/dL Final    Creatinine  1.22  0.60 - 1.30 mg/dL Final    Comment: Standardized to IDMS reference method    Glucose 393 (*) 65 - 140 mg/dL Final    Comment: If the patient is fasting, the ADA then defines impaired fasting glucose as > 100 mg/dL and diabetes as > or equal to 123 mg/dL.    Calcium 8.4  8.4 - 10.2 mg/dL Final    eGFR 60  ml/min/1.73sq m Final    Narrative:     National Kidney Disease Foundation guidelines for Chronic Kidney Disease (CKD):     Stage 1 with normal or high GFR (GFR > 90 mL/min/1.73 square meters)    Stage 2 Mild CKD (GFR = 60-89 mL/min/1.73 square meters)    Stage 3A Moderate CKD (GFR = 45-59 mL/min/1.73 square meters)    Stage 3B Moderate CKD (GFR = 30-44 mL/min/1.73 square meters)    Stage 4 Severe CKD (GFR = 15-29 mL/min/1.73 square meters)    Stage 5 End Stage CKD (GFR <15 mL/min/1.73 square meters)  Note: GFR calculation is accurate only with a steady state creatinine   BLOOD GAS, VENOUS - Abnormal    pH, Darvin 7.294 (*) 7.300 - 7.400 Final    pCO2, Darvin 51.8 (*) 42.0 - 50.0 mm Hg Final    pO2, Darvin 19.4 (*) 35.0 - 45.0 mm Hg Final    HCO3, Darvin 24.6  24 - 30 mmol/L Final    Base Excess, Darvin -2.3  mmol/L Final    O2 Content, Darvin 4.7  ml/dL Final    O2 HGB, VENOUS 30.1 (*) 60.0 - 80.0 % Final   UA W REFLEX TO MICROSCOPIC WITH REFLEX TO CULTURE - Abnormal    Color, UA Colorless   Final    Clarity, UA Clear   Final    Specific Gravity, UA 1.007  1.003 - 1.030 Final    Comment: High concentrations of glucose or protein may affect the specific gravity    pH, UA 5.0  4.5, 5.0, 5.5, 6.0, 6.5, 7.0, 7.5, 8.0 Final    Leukocytes, UA   (*) Negative Final    Value: Elevated glucose may cause decreased leukocyte values. See urine microscopic for UWBC result    Nitrite, UA Negative  Negative Final    Protein, UA Negative  Negative mg/dl Final    Glucose, UA >=1000 (1%) (*) Negative mg/dl Final    Comment: Elevated glucose may cause false negative leukocyte esterase    Ketones, UA Negative  Negative mg/dl Final    Urobilinogen,  UA <2.0  <2.0 mg/dl mg/dl Final    Bilirubin, UA Negative  Negative Final    Occult Blood, UA Negative  Negative Final   POCT GLUCOSE - Abnormal    POC Glucose 422 (*) 65 - 140 mg/dl Final    Comment: NURSE NOTIFIED-   POCT GLUCOSE - Abnormal    POC Glucose 376 (*) 65 - 140 mg/dl Final   BETA HYDROXYBUTYRATE - Normal    Beta- Hydroxybutyrate 0.07  0.02 - 0.27 mmol/L Final   PHOSPHORUS - Normal    Phosphorus 3.9  2.3 - 4.1 mg/dL Final    Comment: Slightly Hemolyzed:Results may be affected.   MAGNESIUM - Normal    Magnesium 1.9  1.9 - 2.7 mg/dL Final   URINE MICROSCOPIC - Normal    RBC, UA None Seen  None Seen, 1-2 /hpf Final    WBC, UA None Seen  None Seen, 1-2 /hpf Final    Epithelial Cells None Seen  None Seen, Occasional /hpf Final    Bacteria, UA None Seen  None Seen, Occasional /hpf Final    No orders to display      Medications: Code Status:   Medications   multi-electrolyte (ISOLYTE-S PH 7.4) bolus 1,000 mL (0 mL Intravenous Stopped 8/27/24 1959)    Code Status: Prior  Advance Directive and Living Will:      Power of :    POLST:       Orders Placed This Encounter   Procedures    CBC and differential    Basic metabolic panel    Blood gas, venous    Beta Hydroxybutyrate    UA w Reflex to Microscopic w Reflex to Culture    Phosphorus    Magnesium    Urine Microscopic    ECG 12 lead    ECG 12 lead     Time reflects when diagnosis was documented in both MDM as applicable and the Disposition within this note       Time User Action Codes Description Comment    8/27/2024  7:47 PM Bettina De Oliveira Add [R73.9] Hyperglycemia           ED Disposition       ED Disposition   Discharge    Condition   Stable    Date/Time   Tue Aug 27, 2024  7:47 PM    Comment   Karson You discharge to home/self care.                   Follow-up Information       Follow up With Specialties Details Why Contact Info Additional Information    [facility]   [address]  [facility] [address] [phone] [facility] [place], [address], [place], [place], [address] [phone]          Discharge Medication List as of 8/27/2024  7:48 PM        CONTINUE these medications which have NOT CHANGED    Details   acetaminophen (TYLENOL) 325 mg tablet Take 3 tablets (975 mg total) by mouth every 8 (eight) hours, Starting Thu 12/21/2023, No Print      albuterol (2.5 mg/3 mL) 0.083 % nebulizer solution Take 3 mL (2.5 mg total) by nebulization every 6 (six) hours as needed for wheezing or shortness of breath, Starting Thu 12/21/2023, No Print      albuterol (PROVENTIL HFA,VENTOLIN HFA) 90 mcg/act inhaler Inhale 2 puffs every 4 (four) hours as needed for wheezing, Starting Thu 12/21/2023, No Print      apixaban (Eliquis) 5 mg Take 1 tablet (5 mg total) by mouth 2 (two) times a day, Starting Wed 5/15/2024, Normal      aspirin 81 mg chewable tablet Chew 81 mg daily, Historical Med      atorvastatin (LIPITOR) 10 mg tablet Take 1 tablet by mouth daily, Starting Tue 7/12/2022, Historical Med      Continuous Blood Gluc  (Dexcom G7 ) CYN Use 1 each continuous, Starting Mon 10/23/2023, Normal      Continuous Glucose Sensor (Dexcom G7 Sensor) Use 1 Device every 10 days, Starting Mon 6/3/2024, Normal      cyanocobalamin (VITAMIN B-12) 100 MCG tablet Take 1 tablet (100 mcg total) by mouth daily Do not start before November 15, 2023., Starting Wed 11/15/2023, Normal      ergocalciferol (VITAMIN D2) 50,000 units Take 1 capsule (50,000 Units total) by mouth once a week, Starting Thu 6/27/2024, Normal      ferrous sulfate 324 (65 Fe) mg Take 1 tablet (324 mg total) by mouth daily before breakfast, Starting Thu 6/27/2024, Normal      fludrocortisone (FLORINEF) 0.1 mg tablet Take 2 tablets (0.2 mg total) by mouth daily, Starting Fri 3/8/2024, Normal      furosemide (LASIX) 20 mg tablet Take 1 tablet (20 mg total) by  mouth 3 (three) times a week, Starting Fri 7/19/2024, Normal      hydrocortisone (CORTEF) 5 mg tablet USE 4 TABLETS IN MORNING AND 3 TABLETS DAILY AFTERNOON, Normal      insulin glargine (Toujeo Max SoloStar) 300 units/mL CONCENTRATED U-300 injection pen (2-unit dial) Inject 12 Units under the skin daily before breakfast, Starting Tue 7/23/2024, Normal      insulin lispro (HumALOG/ADMELOG) 100 units/mL injection USE 5 UNITS BEFORE EACH MEAL PLUS 1 UNITS/60 ABOVE 150MG/DL MAX DOSE 40 UNITS/DAY, Normal      !! Insulin Pen Needle (BD Pen Needle Josie 2nd Gen) 32G X 4 MM MISC FOR USE WITH INSULIN PEN. PHARMACY MAY DISPENSE BRAND COVERED BY INSURANCE., Normal      !! Insulin Pen Needle (BD Pen Needle Josie 2nd Gen) 32G X 4 MM MISC For use with insulin pen 4 times daily. Pharmacy may dispense brand covered by insurance., Normal      Magnesium Oxide 400 MG CAPS Take 1 tablet (400 mg total) by mouth 2 (two) times a day, Starting Thu 6/13/2024, Normal      midodrine (PROAMATINE) 10 MG tablet One tablet TID with meals PRN .Hold for SBP under 140, No Print      potassium chloride (Klor-Con M20) 20 mEq tablet TAKE 2 TABLETS BY MOUTH 2 TIMES A DAY., Starting Sun 7/7/2024, Normal      risperiDONE (RisperDAL) 1 mg tablet Take 1 tablet by mouth 2 (two) times a day, Historical Med      sertraline (ZOLOFT) 100 mg tablet Take 100 mg by mouth daily, Starting Wed 3/16/2022, Until Thu 6/27/2024, Historical Med       !! - Potential duplicate medications found. Please discuss with provider.        No discharge procedures on file.  Prior to Admission Medications   Prescriptions Last Dose Informant Patient Reported? Taking?   Continuous Blood Gluc  (Dexcom G7 ) CYN   No No   Sig: Use 1 each continuous   Continuous Glucose Sensor (Dexcom G7 Sensor)   No No   Sig: Use 1 Device every 10 days   Insulin Pen Needle (BD Pen Needle Josie 2nd Gen) 32G X 4 MM MISC   No No   Sig: FOR USE WITH INSULIN PEN. PHARMACY MAY DISPENSE BRAND  COVERED BY INSURANCE.   Insulin Pen Needle (BD Pen Needle Josie 2nd Gen) 32G X 4 MM MISC   No No   Sig: For use with insulin pen 4 times daily. Pharmacy may dispense brand covered by insurance.   Magnesium Oxide 400 MG CAPS   No No   Sig: Take 1 tablet (400 mg total) by mouth 2 (two) times a day   acetaminophen (TYLENOL) 325 mg tablet   No No   Sig: Take 3 tablets (975 mg total) by mouth every 8 (eight) hours   albuterol (2.5 mg/3 mL) 0.083 % nebulizer solution   No No   Sig: Take 3 mL (2.5 mg total) by nebulization every 6 (six) hours as needed for wheezing or shortness of breath   albuterol (PROVENTIL HFA,VENTOLIN HFA) 90 mcg/act inhaler   No No   Sig: Inhale 2 puffs every 4 (four) hours as needed for wheezing   apixaban (Eliquis) 5 mg   No No   Sig: Take 1 tablet (5 mg total) by mouth 2 (two) times a day   aspirin 81 mg chewable tablet   Yes No   Sig: Chew 81 mg daily   atorvastatin (LIPITOR) 10 mg tablet  Child Yes No   Sig: Take 1 tablet by mouth daily   cyanocobalamin (VITAMIN B-12) 100 MCG tablet   No No   Sig: Take 1 tablet (100 mcg total) by mouth daily Do not start before November 15, 2023.   ergocalciferol (VITAMIN D2) 50,000 units   No No   Sig: Take 1 capsule (50,000 Units total) by mouth once a week   ferrous sulfate 324 (65 Fe) mg   No No   Sig: Take 1 tablet (324 mg total) by mouth daily before breakfast   fludrocortisone (FLORINEF) 0.1 mg tablet   No No   Sig: Take 2 tablets (0.2 mg total) by mouth daily   furosemide (LASIX) 20 mg tablet   No No   Sig: Take 1 tablet (20 mg total) by mouth 3 (three) times a week   hydrocortisone (CORTEF) 5 mg tablet   No No   Sig: USE 4 TABLETS IN MORNING AND 3 TABLETS DAILY AFTERNOON   insulin glargine (Toujeo Max SoloStar) 300 units/mL CONCENTRATED U-300 injection pen (2-unit dial)   No No   Sig: Inject 12 Units under the skin daily before breakfast   insulin lispro (HumALOG/ADMELOG) 100 units/mL injection   No No   Sig: USE 5 UNITS BEFORE EACH MEAL PLUS 1 UNITS/60  "ABOVE 150MG/DL MAX DOSE 40 UNITS/DAY   midodrine (PROAMATINE) 10 MG tablet   No No   Sig: One tablet TID with meals PRN .Hold for SBP under 140   potassium chloride (Klor-Con M20) 20 mEq tablet   No No   Sig: TAKE 2 TABLETS BY MOUTH 2 TIMES A DAY.   risperiDONE (RisperDAL) 1 mg tablet  Child Yes No   Sig: Take 1 tablet by mouth 2 (two) times a day   Patient not taking: Reported on 6/13/2024   sertraline (ZOLOFT) 100 mg tablet  Child Yes No   Sig: Take 100 mg by mouth daily      Facility-Administered Medications: None                        Portions of the record may have been created with voice recognition software. Occasional wrong word or \"sound a like\" substitutions may have occurred due to the inherent limitations of voice recognition software. Read the chart carefully and recognize, using context, where substitutions have occurred.    Electronically signed by:  Dixon Cornejo  "

## 2024-08-27 NOTE — ED PROVIDER NOTES
History  Chief Complaint   Patient presents with    Hyperglycemia - no symptoms     Pt DM1, glucose monitor at 1300 today read glucose greater than 600mg/dl, blood glucose in triage 422mg/dl, asymptomatic at this time, reports taking 8 units units and sliding scale with meals and long acting at night     HPI    Patient is a 69 year old male with history of type I DM and adrenal insufficiency who presents with an elevated glucose reading.  Patient's daughter is at bedside helping to provide history.  She reports that his at home readings were in the 600s.  He has a Dexcom.  She continue to give him the same insulin regimen throughout the evening but glucose levels only dropped to 300s.  He is asymptomatic at this time.  She states that they were told to increase his steroid doses at home when he becomes sick.  She was concerned that he was starting him ill so she increase his dose of steroids at home.  He denies fevers, cough, congestion, chest pain, shortness of breath, urinary symptoms.              Prior to Admission Medications   Prescriptions Last Dose Informant Patient Reported? Taking?   Continuous Blood Gluc  (Dexcom G7 ) CYN   No No   Sig: Use 1 each continuous   Continuous Glucose Sensor (Dexcom G7 Sensor)   No No   Sig: Use 1 Device every 10 days   Insulin Pen Needle (BD Pen Needle Josie 2nd Gen) 32G X 4 MM MISC   No No   Sig: FOR USE WITH INSULIN PEN. PHARMACY MAY DISPENSE BRAND COVERED BY INSURANCE.   Insulin Pen Needle (BD Pen Needle Josie 2nd Gen) 32G X 4 MM MISC   No No   Sig: For use with insulin pen 4 times daily. Pharmacy may dispense brand covered by insurance.   Magnesium Oxide 400 MG CAPS   No No   Sig: Take 1 tablet (400 mg total) by mouth 2 (two) times a day   acetaminophen (TYLENOL) 325 mg tablet   No No   Sig: Take 3 tablets (975 mg total) by mouth every 8 (eight) hours   albuterol (2.5 mg/3 mL) 0.083 % nebulizer solution   No No   Sig: Take 3 mL (2.5 mg total) by nebulization  every 6 (six) hours as needed for wheezing or shortness of breath   albuterol (PROVENTIL HFA,VENTOLIN HFA) 90 mcg/act inhaler   No No   Sig: Inhale 2 puffs every 4 (four) hours as needed for wheezing   apixaban (Eliquis) 5 mg   No No   Sig: Take 1 tablet (5 mg total) by mouth 2 (two) times a day   aspirin 81 mg chewable tablet   Yes No   Sig: Chew 81 mg daily   atorvastatin (LIPITOR) 10 mg tablet  Child Yes No   Sig: Take 1 tablet by mouth daily   cyanocobalamin (VITAMIN B-12) 100 MCG tablet   No No   Sig: Take 1 tablet (100 mcg total) by mouth daily Do not start before November 15, 2023.   ergocalciferol (VITAMIN D2) 50,000 units   No No   Sig: Take 1 capsule (50,000 Units total) by mouth once a week   ferrous sulfate 324 (65 Fe) mg   No No   Sig: Take 1 tablet (324 mg total) by mouth daily before breakfast   fludrocortisone (FLORINEF) 0.1 mg tablet   No No   Sig: Take 2 tablets (0.2 mg total) by mouth daily   furosemide (LASIX) 20 mg tablet   No No   Sig: Take 1 tablet (20 mg total) by mouth 3 (three) times a week   hydrocortisone (CORTEF) 5 mg tablet   No No   Sig: USE 4 TABLETS IN MORNING AND 3 TABLETS DAILY AFTERNOON   insulin glargine (Toujeo Max SoloStar) 300 units/mL CONCENTRATED U-300 injection pen (2-unit dial)   No No   Sig: Inject 12 Units under the skin daily before breakfast   insulin lispro (HumALOG/ADMELOG) 100 units/mL injection   No No   Sig: USE 5 UNITS BEFORE EACH MEAL PLUS 1 UNITS/60 ABOVE 150MG/DL MAX DOSE 40 UNITS/DAY   midodrine (PROAMATINE) 10 MG tablet   No No   Sig: One tablet TID with meals PRN .Hold for SBP under 140   potassium chloride (Klor-Con M20) 20 mEq tablet   No No   Sig: TAKE 2 TABLETS BY MOUTH 2 TIMES A DAY.   risperiDONE (RisperDAL) 1 mg tablet  Child Yes No   Sig: Take 1 tablet by mouth 2 (two) times a day   Patient not taking: Reported on 6/13/2024   sertraline (ZOLOFT) 100 mg tablet  Child Yes No   Sig: Take 100 mg by mouth daily      Facility-Administered Medications:  None       Past Medical History:   Diagnosis Date    A-fib (HCC)     Adrenal insufficiency (HCC)     Atrial fibrillation (HCC)     Diabetes mellitus (HCC)     History of stroke 10/19/2022    Obesity, morbid (HCC) 11/14/2022    Shock (HCC) 12/14/2023    Stroke (HCC)     Type 2 MI (myocardial infarction) (HCC) 11/03/2022       History reviewed. No pertinent surgical history.    Family History   Problem Relation Age of Onset    Heart disease Mother     Cancer Father     Multiple sclerosis Sister      I have reviewed and agree with the history as documented.    E-Cigarette/Vaping    E-Cigarette Use Never User      E-Cigarette/Vaping Substances    Nicotine No     THC No     CBD No     Flavoring No     Other No     Unknown No      Social History     Tobacco Use    Smoking status: Former     Current packs/day: 0.25     Average packs/day: 0.3 packs/day for 30.0 years (7.5 ttl pk-yrs)     Types: Cigarettes    Smokeless tobacco: Never   Vaping Use    Vaping status: Never Used   Substance Use Topics    Alcohol use: Not Currently     Alcohol/week: 5.0 standard drinks of alcohol     Types: 5 Cans of beer per week     Comment: Quit in august 2022    Drug use: Never        Review of Systems   Constitutional:  Negative for chills and fever.   HENT:  Negative for congestion and sore throat.    Respiratory:  Negative for cough and shortness of breath.    Cardiovascular:  Negative for chest pain and palpitations.   Gastrointestinal:  Negative for abdominal pain and vomiting.   Genitourinary:  Negative for dysuria and hematuria.   Musculoskeletal:  Negative for back pain and neck pain.   Neurological:  Negative for syncope and headaches.       Physical Exam  ED Triage Vitals [08/27/24 1448]   Temperature Pulse Respirations Blood Pressure SpO2   98 °F (36.7 °C) 68 18 115/68 99 %      Temp Source Heart Rate Source Patient Position - Orthostatic VS BP Location FiO2 (%)   Tympanic Monitor Sitting Right arm --      Pain Score       --              Orthostatic Vital Signs  Vitals:    08/27/24 1448 08/27/24 1802   BP: 115/68 163/83   Pulse: 68 96   Patient Position - Orthostatic VS: Sitting        Physical Exam  Vitals and nursing note reviewed.   HENT:      Head: Normocephalic and atraumatic.      Nose: No congestion or rhinorrhea.      Mouth/Throat:      Mouth: Mucous membranes are moist.   Eyes:      General:         Right eye: No discharge.         Left eye: No discharge.      Extraocular Movements: Extraocular movements intact.   Cardiovascular:      Rate and Rhythm: Normal rate and regular rhythm.   Pulmonary:      Effort: Pulmonary effort is normal. No respiratory distress.      Breath sounds: Normal breath sounds. No wheezing or rhonchi.   Abdominal:      Palpations: Abdomen is soft.      Tenderness: There is no abdominal tenderness.   Musculoskeletal:      Cervical back: Normal range of motion.      Right lower leg: No edema.      Left lower leg: No edema.   Skin:     General: Skin is warm and dry.      Capillary Refill: Capillary refill takes less than 2 seconds.   Neurological:      Mental Status: He is alert.   Psychiatric:         Mood and Affect: Mood normal.         ED Medications  Medications   multi-electrolyte (ISOLYTE-S PH 7.4) bolus 1,000 mL (0 mL Intravenous Stopped 8/27/24 1959)       Diagnostic Studies  Results Reviewed       Procedure Component Value Units Date/Time    Urine Microscopic [[id number]]  (Normal) Collected: 08/27/24 1824    Lab Status: Final result Specimen: Urine, Clean Catch Updated: 08/27/24 1900     RBC, UA None Seen /hpf      WBC, UA None Seen /hpf      Epithelial Cells None Seen /hpf      Bacteria, UA None Seen /hpf     UA w Reflex to Microscopic w Reflex to Culture [[id number]]  (Abnormal) Collected: 08/27/24 1824    Lab Status: Final result Specimen: Urine, Clean Catch Updated: 08/27/24 1859     Color, UA Colorless     Clarity, UA Clear     Specific Gravity, UA 1.007     pH, UA 5.0     Leukocytes, UA Elevated  glucose may cause decreased leukocyte values. See urine microscopic for UWBC result     Nitrite, UA Negative     Protein, UA Negative mg/dl      Glucose, UA >=1000 (1%) mg/dl      Ketones, UA Negative mg/dl      Urobilinogen, UA <2.0 mg/dl      Bilirubin, UA Negative     Occult Blood, UA Negative    Beta Hydroxybutyrate [[id number]]  (Normal) Collected: 08/27/24 1823    Lab Status: Final result Specimen: Blood from Arm, Right Updated: 08/27/24 1853     Beta- Hydroxybutyrate 0.07 mmol/L     Basic metabolic panel [[id number]]  (Abnormal) Collected: 08/27/24 1823    Lab Status: Final result Specimen: Blood from Arm, Right Updated: 08/27/24 1853     Sodium 130 mmol/L      Potassium 4.9 mmol/L      Chloride 98 mmol/L      CO2 27 mmol/L      ANION GAP 5 mmol/L      BUN 24 mg/dL      Creatinine 1.22 mg/dL      Glucose 393 mg/dL      Calcium 8.4 mg/dL      eGFR 60 ml/min/1.73sq m     Narrative:      National Kid[employer] Foundation guidelines for Chronic Kidney Disease (CKD):     Stage 1 with normal or high GFR (GFR > 90 mL/min/1.73 square meters)    Stage 2 Mild CKD (GFR = 60-89 mL/min/1.73 square meters)    Stage 3A Moderate CKD (GFR = 45-59 mL/min/1.73 square meters)    Stage 3B Moderate CKD (GFR = 30-44 mL/min/1.73 square meters)    Stage 4 Severe CKD (GFR = 15-29 mL/min/1.73 square meters)    Stage 5 End Stage CKD (GFR <15 mL/min/1.73 square meters)  Note: GFR calculation is accurate only with a steady state creatinine    Phosphorus [[id number]]  (Normal) Collected: 08/27/24 1823    Lab Status: Final result Specimen: Blood from Arm, Right Updated: 08/27/24 1853     Phosphorus 3.9 mg/dL     Magnesium [[id number]]  (Normal) Collected: 08/27/24 1823    Lab Status: Final result Specimen: Blood from Arm, Right Updated: 08/27/24 1853     Magnesium 1.9 mg/dL     Blood gas, venous [[id number]]  (Abnormal) Collected: 08/27/24 1835    Lab Status: Final result Specimen: Blood from Arm, Right Updated: 08/27/24 1846     pH, Darvin 7.294      pCO2, Darvin 51.8 mm Hg      pO2, Darvin 19.4 mm Hg      HCO3, Darvin 24.6 mmol/L      Base Excess, Darvin -2.3 mmol/L      O2 Content, Darvin 4.7 ml/dL      O2 HGB, VENOUS 30.1 %     CBC and differential [[id number]]  (Abnormal) Collected: 08/27/24 1823    Lab Status: Final result Specimen: Blood from Arm, Right Updated: 08/27/24 1843     WBC 7.79 Thousand/uL      RBC 3.72 Million/uL      Hemoglobin 11.6 g/dL      Hematocrit 34.7 %      MCV 93 fL      MCH 31.2 pg      MCHC 33.4 g/dL      RDW 15.5 %      MPV 10.9 fL      Platelets 151 Thousands/uL      nRBC 0 /100 WBCs      Segmented % 74 %      Immature Grans % 1 %      Lymphocytes % 12 %      Monocytes % 12 %      Eosinophils Relative 1 %      Basophils Relative 0 %      Absolute Neutrophils 5.85 Thousands/µL      Absolute Immature Grans 0.04 Thousand/uL      Absolute Lymphocytes 0.93 Thousands/µL      Absolute Monocytes 0.91 Thousand/µL      Eosinophils Absolute 0.04 Thousand/µL      Basophils Absolute 0.02 Thousands/µL     Fingerstick Glucose (POCT) [[id number]]  (Abnormal) Collected: 08/27/24 1750    Lab Status: Final result Specimen: Blood Updated: 08/27/24 1751     POC Glucose 376 mg/dl     Fingerstick Glucose (POCT) [[id number]]  (Abnormal) Collected: 08/27/24 1445    Lab Status: Final result Specimen: Blood Updated: 08/27/24 1446     POC Glucose 422 mg/dl                    No orders to display         Procedures  ECG 12 Lead Documentation Only    Date/Time: 8/27/2024 4:45 PM    Performed by: Bettina De Oliveira MD  Authorized by: Bettina De Oliveira MD    Patient location:  ED  Previous ECG:     Previous ECG:  Compared to current    Similarity:  No change  Interpretation:     Interpretation: abnormal    Rate:     ECG rate:  70    ECG rate assessment: normal    Rhythm:     Rhythm: sinus rhythm    Ectopy:     Ectopy: none    QRS:     QRS axis:  Normal    QRS intervals:  Normal  Conduction:     Conduction: normal    ST segments:     ST segments:  Normal  T waves:     T waves: inverted       Inverted:  II, III, V4, V5 and V6        ED Course  ED Course as of 08/31/24 2352   Tue Aug 27, 2024   1853 ANION GAP: 5   1854 Beta- Hydroxybutyrate: 0.07                                       Medical Decision Making  Differential: hyperglycemia, DKA, UTI    Plan: EKG, CBC, CMP, beta hydroxybutyrate, VBG,UA, NS bolus     EKG is nonischemic.  Corrected sodium is normal.  Hemoglobin is stable.  Beta hydroxybutyrate is normal.  Other electrolytes are normal.  UA is negative for infection.  VBG shows mild acidosis.  He does not have an anion gap.  Glucose improved following fluid bolus.  Discussed with patient's daughter that he should follow-up with his endocrinologist.  She states she will call them tomorrow.  He was given return precautions and discharged from the ED.    Amount and/or Complexity of Data Reviewed  Labs: ordered. Decision-making details documented in ED Course.    Risk  Prescription drug management.          Disposition  Final diagnoses:   Hyperglycemia     Time reflects when diagnosis was documented in both MDM as applicable and the Disposition within this note       Time User Action Codes Description Comment    8/27/2024  7:47 PM Bettina De Oliveira Add [R73.9] Hyperglycemia           ED Disposition       ED Disposition   Discharge    Condition   Stable    Date/Time   Tue Aug 27, 2024  7:47 PM    Comment   Karson You discharge to home/self care.                   Follow-up Information       Follow up With Specialties Details Why Contact Info Additional Information    [facility]   [address]  [facility] [address]  [facility] [id number] [phone] [facility], [address] [facility] [address], [place], [place], [id number] [phone]            Discharge Medication List as of 8/27/2024  7:48 PM        CONTINUE these medications which have NOT CHANGED    Details   acetaminophen (TYLENOL) 325  mg tablet Take 3 tablets (975 mg total) by mouth every 8 (eight) hours, Starting Thu 12/21/2023, No Print      albuterol (2.5 mg/3 mL) 0.083 % nebulizer solution Take 3 mL (2.5 mg total) by nebulization every 6 (six) hours as needed for wheezing or shortness of breath, Starting Thu 12/21/2023, No Print      albuterol (PROVENTIL HFA,VENTOLIN HFA) 90 mcg/act inhaler Inhale 2 puffs every 4 (four) hours as needed for wheezing, Starting Thu 12/21/2023, No Print      apixaban (Eliquis) 5 mg Take 1 tablet (5 mg total) by mouth 2 (two) times a day, Starting Wed 5/15/2024, Normal      aspirin 81 mg chewable tablet Chew 81 mg daily, Historical Med      atorvastatin (LIPITOR) 10 mg tablet Take 1 tablet by mouth daily, Starting Tue 7/12/2022, Historical Med      Continuous Blood Gluc  (Dexcom G7 ) CYN Use 1 each continuous, Starting Mon 10/23/2023, Normal      Continuous Glucose Sensor (Dexcom G7 Sensor) Use 1 Device every 10 days, Starting Mon 6/3/2024, Normal      cyanocobalamin (VITAMIN B-12) 100 MCG tablet Take 1 tablet (100 mcg total) by mouth daily Do not start before November 15, 2023., Starting Wed 11/15/2023, Normal      ergocalciferol (VITAMIN D2) 50,000 units Take 1 capsule (50,000 Units total) by mouth once a week, Starting Thu 6/27/2024, Normal      ferrous sulfate 324 (65 Fe) mg Take 1 tablet (324 mg total) by mouth daily before breakfast, Starting Thu 6/27/2024, Normal      fludrocortisone (FLORINEF) 0.1 mg tablet Take 2 tablets (0.2 mg total) by mouth daily, Starting Fri 3/8/2024, Normal      furosemide (LASIX) 20 mg tablet Take 1 tablet (20 mg total) by mouth 3 (three) times a week, Starting Fri 7/19/2024, Normal      hydrocortisone (CORTEF) 5 mg tablet USE 4 TABLETS IN MORNING AND 3 TABLETS DAILY AFTERNOON, Normal      insulin glargine (Toujeo Max SoloStar) 300 units/mL CONCENTRATED U-300 injection pen (2-unit dial) Inject 12 Units under the skin daily before breakfast, Starting Tue 7/23/2024,  Normal      insulin lispro (HumALOG/ADMELOG) 100 units/mL injection USE 5 UNITS BEFORE EACH MEAL PLUS 1 UNITS/60 ABOVE 150MG/DL MAX DOSE 40 UNITS/DAY, Normal      !! Insulin Pen Needle (BD Pen Needle Josie 2nd Gen) 32G X 4 MM MISC FOR USE WITH INSULIN PEN. PHARMACY MAY DISPENSE BRAND COVERED BY INSURANCE., Normal      !! Insulin Pen Needle (BD Pen Needle Josie 2nd Gen) 32G X 4 MM MISC For use with insulin pen 4 times daily. Pharmacy may dispense brand covered by insurance., Normal      Magnesium Oxide 400 MG CAPS Take 1 tablet (400 mg total) by mouth 2 (two) times a day, Starting Thu 6/13/2024, Normal      midodrine (PROAMATINE) 10 MG tablet One tablet TID with meals PRN .Hold for SBP under 140, No Print      potassium chloride (Klor-Con M20) 20 mEq tablet TAKE 2 TABLETS BY MOUTH 2 TIMES A DAY., Starting Sun 7/7/2024, Normal      risperiDONE (RisperDAL) 1 mg tablet Take 1 tablet by mouth 2 (two) times a day, Historical Med      sertraline (ZOLOFT) 100 mg tablet Take 100 mg by mouth daily, Starting Wed 3/16/2022, Until Thu 6/27/2024, Historical Med       !! - Potential duplicate medications found. Please discuss with provider.        No discharge procedures on file.    PDMP Review         Value Time User    PDMP Reviewed  Yes 3/11/2024 10:52 AM Danielle Mendez MD             ED Provider  Attending physically available and evaluated Karson You. I managed the patient along with the ED Attending.    Electronically Signed by           Bettina De Oliveira MD  08/31/24 [id number]

## 2024-08-27 NOTE — DISCHARGE INSTRUCTIONS
You were seen in the Emergency Department today for hyperglycemia.    Please follow up with your Endocrinologist tomorrow.  Please return to the Emergency Department if you experience worsening of your current symptoms, fever, chest pain, shortness of breath, or any other concerning symptoms.

## 2024-08-27 NOTE — ED NOTES
Attempted twice to collect blood work, via IV and straight needle, was unable to draw back blood. Provider made aware for need of blood work.      Laura Tyson RN  08/27/24 [id number]

## 2024-09-01 NOTE — ASSESSMENT & PLAN NOTE
Patient found to have systolic blood pressure in the 70s while in the ED.  Received 1 L IV bolus with no improvement. He was then given midodrine, hydrocortisone IV and Levophed. Pressor was wean after 1 hour with continued stable vitals  Initial lactate 2.2, now normalized to 1.4  Etiology of shock suspect multifactorial  Hypovolemic: Patient reports 4+ episodes of diarrhea for the last 3 days, dehydration  Septic: Patient with fever in EMS.  Suspect source GI with reported diarrhea versus respiratory.   Chronic adrenal insufficiency and ?missed home med this morning at facility  Regarding GI patient with no abdominal pain on my exam and no diarrhea while in the hospital.  Could consider CT abdomen pelvis if patient develops abdominal pain, diarrhea restarts or becomes unstable.C. difficile and stool studies ordered and pending   Even though patient has no respiratory symptoms chest x-ray reviewed by me shows a left lung opacity and with elevated procalcitonin suspect left lung PNA. Continue antibiotics as below and f/u final CXR read  UA unremarkable patient with no UTI symptoms. Less likely CNS as patient mentation is now back to baseline and no neck rigidity.  No visible cellulitis on extremities  Follow blood cultures  Obtain sputum culture and Legionella  S/p vancomycin and ceftriaxone in the ED  Patient meets criteria for severe CAP with low risk MDRO--Start ceftriaxone and azithromycin  Continue IV fluid resuscitation  Continue home hydrocortisone and fludrocortisone   No

## 2024-09-03 ENCOUNTER — TELEPHONE (OUTPATIENT)
Dept: ENDOCRINOLOGY | Facility: CLINIC | Age: 69
End: 2024-09-03

## 2024-09-03 DIAGNOSIS — I50.32 CHRONIC DIASTOLIC HEART FAILURE (HCC): ICD-10-CM

## 2024-09-03 RX ORDER — INSULIN LISPRO 100 [IU]/ML
INJECTION, SOLUTION INTRAVENOUS; SUBCUTANEOUS
Qty: 30 ML | Refills: 0 | Status: CANCELLED | OUTPATIENT
Start: 2024-09-03

## 2024-09-03 NOTE — TELEPHONE ENCOUNTER
Spoke to patient's daughter (Ofelia) to let her know that I have sent her message high priority to the doctor, and that we will give her a call back as soon as we hear back from the doctor. She verbalized understanding.

## 2024-09-03 NOTE — TELEPHONE ENCOUNTER
Patient daughter requesting refill request.   He is out of medication today.     He needs for lunch time.   Lispro    [facility] [address]

## 2024-09-05 ENCOUNTER — OFFICE VISIT (OUTPATIENT)
Dept: FAMILY MEDICINE CLINIC | Facility: CLINIC | Age: 69
End: 2024-09-05
Payer: COMMERCIAL

## 2024-09-05 VITALS
WEIGHT: 171.6 LBS | HEIGHT: 64 IN | BODY MASS INDEX: 29.3 KG/M2 | DIASTOLIC BLOOD PRESSURE: 52 MMHG | OXYGEN SATURATION: 99 % | SYSTOLIC BLOOD PRESSURE: 96 MMHG | RESPIRATION RATE: 16 BRPM | HEART RATE: 67 BPM | TEMPERATURE: 96.8 F

## 2024-09-05 DIAGNOSIS — E27.40 ADRENAL INSUFFICIENCY (HCC): ICD-10-CM

## 2024-09-05 DIAGNOSIS — E10.43 TYPE 1 DIABETES MELLITUS WITH DIABETIC AUTONOMIC (POLY)NEUROPATHY (HCC): ICD-10-CM

## 2024-09-05 DIAGNOSIS — Z86.73 HISTORY OF CVA (CEREBROVASCULAR ACCIDENT): ICD-10-CM

## 2024-09-05 DIAGNOSIS — R32 URINARY INCONTINENCE, UNSPECIFIED TYPE: ICD-10-CM

## 2024-09-05 DIAGNOSIS — E78.2 MIXED HYPERLIPIDEMIA: ICD-10-CM

## 2024-09-05 DIAGNOSIS — Z13.6 SCREENING FOR CARDIOVASCULAR CONDITION: ICD-10-CM

## 2024-09-05 DIAGNOSIS — D63.8 ANEMIA OF CHRONIC DISEASE: ICD-10-CM

## 2024-09-05 DIAGNOSIS — I48.0 PAROXYSMAL ATRIAL FIBRILLATION (HCC): ICD-10-CM

## 2024-09-05 DIAGNOSIS — I50.32 CHRONIC DIASTOLIC HEART FAILURE (HCC): ICD-10-CM

## 2024-09-05 DIAGNOSIS — Z00.00 MEDICARE ANNUAL WELLNESS VISIT, SUBSEQUENT: Primary | ICD-10-CM

## 2024-09-05 DIAGNOSIS — Z12.5 SCREENING FOR PROSTATE CANCER: ICD-10-CM

## 2024-09-05 DIAGNOSIS — N18.2 STAGE 2 CHRONIC KIDNEY DISEASE: ICD-10-CM

## 2024-09-05 PROBLEM — E46 UNSPECIFIED PROTEIN-CALORIE MALNUTRITION (HCC): Status: RESOLVED | Noted: 2022-12-01 | Resolved: 2024-09-05

## 2024-09-05 PROCEDURE — G0439 PPPS, SUBSEQ VISIT: HCPCS

## 2024-09-05 PROCEDURE — 99204 OFFICE O/P NEW MOD 45 MIN: CPT

## 2024-09-05 NOTE — ASSESSMENT & PLAN NOTE
Lab Results   Component Value Date    HGBA1C 7.0 (H) 06/13/2024     Foll with endocrinology, controlled on insulin therapy. DM foot exam completed, daughter to provide copy of eye exam completed wn last 12 months.

## 2024-09-05 NOTE — ASSESSMENT & PLAN NOTE
Wt Readings from Last 3 Encounters:   09/05/24 77.8 kg (171 lb 9.6 oz)   06/27/24 80.5 kg (177 lb 6.4 oz)   06/13/24 88.9 kg (196 lb)     Pt foll with cardiology has skilled nursing visits and per daughter os weighed daily. Continue current medication regimen.

## 2024-09-05 NOTE — ASSESSMENT & PLAN NOTE
Secondary to diuretic use, daughter will discuss with cardiology reducing frequency of lasix also recommend scheduled toileting

## 2024-09-05 NOTE — ASSESSMENT & PLAN NOTE
Lab Results   Component Value Date    EGFR 60 08/27/2024    EGFR 57 06/13/2024    EGFR 59 03/14/2024    CREATININE 1.22 08/27/2024    CREATININE 1.28 06/13/2024    CREATININE 1.24 03/14/2024     Stable foll with [facility] nephrology last seen 6-24-24

## 2024-09-05 NOTE — ASSESSMENT & PLAN NOTE
CPE done, pt has repeat colonoscopy ordered was unable to be completed in 2023 secondary to  moderate stool. PSA and lipid panel ordered. Pt is not a smoker, dos not drink alcohol has history of multiple falls and per daughter receiving home physical therapy. Pt wears glasses and foll with ophthalmology. Daughter manages medication and has living will and medical POA at home. Daughter to fu for vaccines.

## 2024-09-05 NOTE — PROGRESS NOTES
Ambulatory Visit  Name: Karson You      : 1955      MRN: [id number]  Encounter Provider: NACHO Kenney  Encounter Date: 2024   Encounter department: [facility]    Assessment & Plan   1. Medicare annual wellness visit, subsequent  Assessment & Plan:  CPE done, pt has repeat colonoscopy ordered was unable to be completed in  secondary to  moderate stool. PSA and lipid panel ordered. Pt is not a smoker, dos not drink alcohol has history of multiple falls and per daughter receiving home physical therapy. Pt wears glasses and foll with ophthalmology. Daughter manages medication and has living will and medical POA at home. Daughter to fu for vaccines.  2. Screening for cardiovascular condition  -     Lipid panel  3. Screening for prostate cancer  -     PSA, Total Screen  4. Urinary incontinence, unspecified type  Assessment & Plan:  Secondary to diuretic use, daughter will discuss with cardiology reducing frequency of lasix also recommend scheduled toileting    5. Paroxysmal atrial fibrillation (HCC)  Assessment & Plan:  Stable on Eliquis 5 mg bid foll with cardiology last seen 24  6. Type 1 diabetes mellitus with diabetic autonomic (poly)neuropathy (HCC)  Assessment & Plan:    Lab Results   Component Value Date    HGBA1C 7.0 (H) 2024     Foll with endocrinology, controlled on insulin therapy. DM foot exam completed, daughter to provide copy of eye exam completed wn last 12 months.  Orders:  -     Ambulatory Referral to Podiatry; Future  7. Stage 2 chronic kidney disease  Assessment & Plan:  Lab Results   Component Value Date    EGFR 60 2024    EGFR 57 2024    EGFR 59 2024    CREATININE 1.22 2024    CREATININE 1.28 2024    CREATININE 1.24 2024     Stable foll with [facility] nephrology last seen 24   8. Chronic diastolic heart failure (HCC)  Assessment & Plan:  Wt Readings from Last 3 Encounters:   24 77.8  kg (171 lb 9.6 oz)   06/27/24 80.5 kg (177 lb 6.4 oz)   06/13/24 88.9 kg (196 lb)     Pt foll with cardiology has skilled nursing visits and per daughter os weighed daily. Continue current medication regimen.        9. Mixed hyperlipidemia  Assessment & Plan:  Check lipid panel. Continue atorvastatin 10 mg qd.  10. History of CVA (cerebrovascular accident)  Assessment & Plan:  CT head done March 2024 without acute changes. Stable on ASA and statin therapy.  11. Anemia of chronic disease  Assessment & Plan:  At baseline CBC done 8-27-24  12. Adrenal insufficiency (HCC)  Assessment & Plan:  Foll with endocrinology continue current medication therapy       Depression Screening and Follow-up Plan: Patient was screened for depression during today's encounter. They screened negative with a PHQ-2 score of 0.    Falls Plan of Care: balance, strength, and gait training instructions were provided. Recommended assistive device to help with gait and balance. Patient assessed for orthostatic hypotension. Pt has skilled PT at home      Preventive health issues were discussed with patient, and age appropriate screening tests were ordered as noted in patient's After Visit Summary. Personalized health advice and appropriate referrals for health education or preventive services given if needed, as noted in patient's After Visit Summary.    History of Present Illness     Pt and daughter presents to establish care and MAW. Pt was foll with [facility] internal medicine for chronic issues       Patient Care Team:  NACHO Kenney as PCP - General (Family Medicine)  Lloyd Mosqueda MD (Nephrology)  Tree Spence MD (Family Medicine)  Tree Spence MD (Family Medicine)    Review of Systems   Constitutional:  Negative for activity change, appetite change, chills, diaphoresis, fatigue, fever and unexpected weight change.   HENT:  Negative for congestion, dental problem, drooling, ear discharge, ear pain, facial swelling, hearing  loss, mouth sores, nosebleeds, postnasal drip, rhinorrhea, sinus pressure, sinus pain, sneezing, sore throat, tinnitus, trouble swallowing and voice change.    Eyes:  Negative for photophobia, pain, discharge, redness, itching and visual disturbance.   Respiratory:  Negative for apnea, cough, choking, chest tightness, shortness of breath, wheezing and stridor.    Cardiovascular:  Negative for chest pain, palpitations and leg swelling.   Gastrointestinal:  Negative for abdominal distention, abdominal pain, anal bleeding, blood in stool, constipation, diarrhea, nausea and vomiting.   Endocrine: Negative for cold intolerance, heat intolerance, polydipsia, polyphagia and polyuria.   Genitourinary:  Negative for decreased urine volume, difficulty urinating, dysuria, flank pain, frequency, hematuria, penile discharge, penile pain, penile swelling, scrotal swelling, testicular pain and urgency.   Musculoskeletal:  Positive for gait problem. Negative for arthralgias, back pain, joint swelling, myalgias, neck pain and neck stiffness.   Skin:  Negative for color change, rash and wound.   Allergic/Immunologic: Negative for environmental allergies, food allergies and immunocompromised state.   Neurological:  Negative for dizziness, tremors, seizures, syncope, facial asymmetry, speech difficulty, weakness, light-headedness, numbness and headaches.   Hematological:  Negative for adenopathy. Does not bruise/bleed easily.   Psychiatric/Behavioral:  Negative for agitation, behavioral problems, confusion, decreased concentration, dysphoric mood, hallucinations, self-injury, sleep disturbance and suicidal ideas. The patient is not nervous/anxious and is not hyperactive.    All other systems reviewed and are negative.      Diabetic Foot Exam    Patient's shoes and socks removed.    Right Foot/Ankle   Right Foot Inspection  Skin Exam: skin normal and skin intact. No dry skin, no warmth, no callus, no erythema, no maceration, no abnormal  color, no pre-ulcer, no ulcer and no callus.     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 No swelling, no tenderness and erythema    Sensory   Monofilament testing: intact    Vascular  Capillary refills: < 3 seconds  The right DP pulse is 2+. The right PT pulse is 2+.     Left Foot/Ankle  Left Foot Inspection  Skin Exam: skin normal and skin intact. No dry skin, no warmth, no erythema, no maceration, normal color, no pre-ulcer, no ulcer and no callus.     Toe Exam: ROM and strength within normal limits. No swelling, no tenderness, no erythema and no left toe deformity.     Sensory   Monofilament testing: intact    Vascular  Capillary refills: < 3 seconds  The left DP pulse is 2+. The left PT pulse is 2+.     Assign Risk Category  Deformity present  No loss of protective sensation  No weak pulses  Risk: 0    Medical History Reviewed by provider this encounter:  Tobacco  Allergies  Meds  Problems  Med Hx  Surg Hx  Fam Hx       Annual Wellness Visit Questionnaire   Last Medicare Wellness visit information reviewed, patient interviewed, no change since last AWV.     Health Risk Assessment:   Patient rates overall health as fair. Patient feels that their physical health rating is same. Patient is satisfied with their life. Eyesight was rated as same. Hearing was rated as same. Patient feels that their emotional and mental health rating is same. Patient states they are never, rarely unusually tired/fatigued. Pain experienced in the last 7 days has been none. Patient states that he has experienced no weight loss or gain in last 6 months.     Depression Screening:   PHQ-2 Score: 0      Fall Risk Screening:   In the past year, patient has experienced: history of falling in past year    Number of falls: 2 or more  Injured during fall?: No    Feels unsteady when standing or walking?: No    Worried about falling?: Yes      Home Safety:  Patient has trouble with  stairs inside or outside of their home. Patient has working smoke alarms and has working carbon monoxide detector. Home safety hazards include: none.     Nutrition:   Current diet is Regular.     Medications:   Patient is not currently taking any over-the-counter supplements. Patient is not able to manage medications.     Activities of Daily Living (ADLs)/Instrumental Activities of Daily Living (IADLs):   Walk and transfer into and out of bed and chair?: Yes  Dress and groom yourself?: No    Bathe or shower yourself?: No    Feed yourself? Yes  Do your laundry/housekeeping?: No  Manage your money, pay your bills and track your expenses?: No  Make your own meals?: No    Do your own shopping?: No    Previous Hospitalizations:   Any hospitalizations or ED visits within the last 12 months?: Yes    How many hospitalizations have you had in the last year?: more than 4    Advance Care Planning:   Living will: Yes    Durable POA for healthcare: Yes    Advanced directive: Yes    Advanced directive counseling given: Yes    Five wishes given: No    Patient declined ACP directive: Yes    End of Life Decisions reviewed with patient: Yes    Provider agrees with end of life decisions: Yes      PREVENTIVE SCREENINGS      Cardiovascular Screening:    General: History Lipid Disorder and Risks and Benefits Discussed    Due for: Lipid Panel      Diabetes Screening:     General: Screening Not Indicated and History Diabetes      Colorectal Cancer Screening:     General: Screening Not Indicated      Prostate Cancer Screening:    General: Risks and Benefits Discussed    Due for: PSA      Abdominal Aortic Aneurysm (AAA) Screening:    Risk factors include: age between 65-76 yo and tobacco use        General: Screening Not Indicated      Lung Cancer Screening:     General: Screening Not Indicated      Hepatitis C Screening:    General: Screening Current    Hep C Screening Accepted: No     Screening, Brief Intervention, and Referral to  "Treatment (SBIRT)    Screening  Typical number of drinks in a day: 0  Typical number of drinks in a week: 0  Interpretation: Low risk drinking behavior.    Single Item Drug Screening:  How often have you used an illegal drug (including marijuana) or a prescription medication for non-medical reasons in the past year? never    Single Item Drug Screen Score: 0  Interpretation: Negative screen for possible drug use disorder    Brief Intervention  Alcohol & drug use screenings were reviewed. No concerns regarding substance use disorder identified.     Other Counseling Topics:   Calcium and vitamin D intake.     Social Determinants of Health     Food Insecurity: No Food Insecurity (9/5/2024)    Hunger Vital Sign     Worried About Running Out of Food in the Last Year: Never true     Ran Out of Food in the Last Year: Never true   Transportation Needs: No Transportation Needs (9/5/2024)    PRAPARE - Transportation     Lack of Transportation (Medical): No     Lack of Transportation (Non-Medical): No   Housing Stability: High Risk (9/5/2024)    Housing Stability Vital Sign     Unable to Pay for Housing in the Last Year: No     Number of Times Moved in the Last Year: 2     Homeless in the Last Year: No   Utilities: Not At Risk (9/5/2024)    [facility] Utilities     Threatened with loss of utilities: No     No results found.    Objective     BP 96/52 (BP Location: Left arm, Patient Position: Sitting, Cuff Size: Adult)   Pulse 67   Temp (!) 96.8 °F (36 °C) (Temporal)   Resp 16   Ht 5' 4\" (1.626 m)   Wt 77.8 kg (171 lb 9.6 oz)   SpO2 99%   BMI 29.46 kg/m²     Physical Exam  Vitals and nursing note reviewed. Exam conducted with a chaperone present.   Constitutional:       General: He is not in acute distress.     Appearance: Normal appearance. He is not ill-appearing, toxic-appearing or diaphoretic.   HENT:      Head: Normocephalic and atraumatic.      Right Ear: Tympanic membrane, ear canal and external ear normal. There is no " impacted cerumen.      Left Ear: Tympanic membrane, ear canal and external ear normal. There is no impacted cerumen.      Nose: Nose normal. No congestion or rhinorrhea.      Mouth/Throat:      Mouth: Mucous membranes are moist.      Pharynx: No oropharyngeal exudate or posterior oropharyngeal erythema.   Eyes:      General: No scleral icterus.        Right eye: No discharge.         Left eye: No discharge.      Extraocular Movements: Extraocular movements intact.      Conjunctiva/sclera: Conjunctivae normal.      Pupils: Pupils are equal, round, and reactive to light.   Neck:      Vascular: No carotid bruit.   Cardiovascular:      Rate and Rhythm: Normal rate and regular rhythm.      Pulses: Normal pulses. no weak pulses.           Dorsalis pedis pulses are 2+ on the right side and 2+ on the left side.        Posterior tibial pulses are 2+ on the right side and 2+ on the left side.      Heart sounds: Normal heart sounds. No murmur heard.  Pulmonary:      Effort: Pulmonary effort is normal. No respiratory distress.      Breath sounds: Normal breath sounds. No stridor. No wheezing, rhonchi or rales.   Chest:      Chest wall: No tenderness.   Abdominal:      General: Bowel sounds are normal. There is no distension.      Palpations: Abdomen is soft. There is no mass.      Tenderness: There is no abdominal tenderness. There is no right CVA tenderness, left CVA tenderness, guarding or rebound.      Hernia: No hernia is present.   Musculoskeletal:         General: No swelling, tenderness, deformity or signs of injury. Normal range of motion.      Cervical back: Normal range of motion and neck supple. No rigidity or tenderness.      Right lower leg: No edema.      Left lower leg: No edema.   Feet:      Right foot:      Skin integrity: No ulcer, skin breakdown, erythema, warmth, callus or dry skin.      Left foot:      Skin integrity: No ulcer, skin breakdown, erythema, warmth, callus or dry skin.   Lymphadenopathy:       Cervical: No cervical adenopathy.   Skin:     General: Skin is warm.      Capillary Refill: Capillary refill takes less than 2 seconds.      Coloration: Skin is not jaundiced or pale.      Findings: No bruising, erythema, lesion or rash.   Neurological:      General: No focal deficit present.      Mental Status: He is alert and oriented to person, place, and time.      Cranial Nerves: No cranial nerve deficit.      Sensory: No sensory deficit.      Motor: No weakness.      Coordination: Coordination normal.      Gait: Gait abnormal.      Deep Tendon Reflexes: Reflexes normal.   Psychiatric:         Attention and Perception: Attention normal. He is attentive. He does not perceive auditory or visual hallucinations.         Mood and Affect: Mood and affect normal.         Speech: Speech normal. He is communicative. Speech is not delayed.         Behavior: Behavior normal. Behavior is not aggressive. Behavior is cooperative.         Thought Content: Thought content normal. Thought content is not paranoid or delusional. Thought content does not include homicidal or suicidal ideation. Thought content does not include homicidal or suicidal plan.         Cognition and Memory: Memory is not impaired.         Judgment: Judgment normal. Judgment is not inappropriate.

## 2024-09-05 NOTE — PATIENT INSTRUCTIONS
Medicare Preventive Visit Patient Instructions  Thank you for completing your Welcome to Medicare Visit or Medicare Annual Wellness Visit today. Your next wellness visit will be due in one year (9/6/2025).  The screening/preventive services that you may require over the next 5-10 years are detailed below. Some tests may not apply to you based off risk factors and/or age. Screening tests ordered at today's visit but not completed yet may show as past due. Also, please note that scanned in results may not display below.  Preventive Screenings:  Service Recommendations Previous Testing/Comments   Colorectal Cancer Screening  Colonoscopy    Fecal Occult Blood Test (FOBT)/Fecal Immunochemical Test (FIT)  Fecal DNA/Cologuard Test  Flexible Sigmoidoscopy Age: 45-75 years old   Colonoscopy: every 10 years (May be performed more frequently if at higher risk)  OR  FOBT/FIT: every 1 year  OR  Cologuard: every 3 years  OR  Sigmoidoscopy: every 5 years  Screening may be recommended earlier than age 45 if at higher risk for colorectal cancer. Also, an individualized decision between you and your healthcare provider will decide whether screening between the ages of 76-85 would be appropriate. Colonoscopy: 10/05/2023  FOBT/FIT: Not on file  Cologuard: Not on file  Sigmoidoscopy: Not on file          Prostate Cancer Screening Individualized decision between patient and health care provider in men between ages of 55-69   Medicare will cover every 12 months beginning on the day after your 50th birthday PSA: No results in last 5 years           Hepatitis C Screening Once for adults born between 1945 and 1965  More frequently in patients at high risk for Hepatitis C Hep C Antibody: 03/12/2024        Diabetes Screening 1-2 times per year if you're at risk for diabetes or have pre-diabetes Fasting glucose: 65 mg/dL (12/5/2023)  A1C: 7.0 % (6/13/2024)      Cholesterol Screening Once every 5 years if you don't have a lipid disorder. May  order more often based on risk factors. Lipid panel: 08/18/2022         Other Preventive Screenings Covered by Medicare:  Abdominal Aortic Aneurysm (AAA) Screening: covered once if your at risk. You're considered to be at risk if you have a family history of AAA or a male between the age of 65-75 who smoking at least 100 cigarettes in your lifetime.  Lung Cancer Screening: covers low dose CT scan once per year if you meet all of the following conditions: (1) Age 55-77; (2) No signs or symptoms of lung cancer; (3) Current smoker or have quit smoking within the last 15 years; (4) You have a tobacco smoking history of at least 20 pack years (packs per day x number of years you smoked); (5) You get a written order from a healthcare provider.  Glaucoma Screening: covered annually if you're considered high risk: (1) You have diabetes OR (2) Family history of glaucoma OR (3)  aged 50 and older OR (4)  American aged 65 and older  Osteoporosis Screening: covered every 2 years if you meet one of the following conditions: (1) Have a vertebral abnormality; (2) On glucocorticoid therapy for more than 3 months; (3) Have primary hyperparathyroidism; (4) On osteoporosis medications and need to assess response to drug therapy.  HIV Screening: covered annually if you're between the age of 15-65. Also covered annually if you are younger than 15 and older than 65 with risk factors for HIV infection. For pregnant patients, it is covered up to 3 times per pregnancy.    Immunizations:  Immunization Recommendations   Influenza Vaccine Annual influenza vaccination during flu season is recommended for all persons aged >= 6 months who do not have contraindications   Pneumococcal Vaccine   * Pneumococcal conjugate vaccine = PCV13 (Prevnar 13), PCV15 (Vaxneuvance), PCV20 (Prevnar 20)  * Pneumococcal polysaccharide vaccine = PPSV23 (Pneumovax) Adults 19-63 yo with certain risk factors or if 65+ yo  If never received any  pneumonia vaccine: recommend Prevnar 20 (PCV20)  Give PCV20 if previously received 1 dose of PCV13 or PPSV23   Hepatitis B Vaccine 3 dose series if at intermediate or high risk (ex: diabetes, end stage renal disease, liver disease)   Respiratory syncytial virus (RSV) Vaccine - COVERED BY MEDICARE PART D  * RSVPreF3 (Arexvy) CDC recommends that adults 60 years of age and older may receive a single dose of RSV vaccine using shared clinical decision-making (SCDM)   Tetanus (Td) Vaccine - COST NOT COVERED BY MEDICARE PART B Following completion of primary series, a booster dose should be given every 10 years to maintain immunity against tetanus. Td may also be given as tetanus wound prophylaxis.   Tdap Vaccine - COST NOT COVERED BY MEDICARE PART B Recommended at least once for all adults. For pregnant patients, recommended with each pregnancy.   Shingles Vaccine (Shingrix) - COST NOT COVERED BY MEDICARE PART B  2 shot series recommended in those 19 years and older who have or will have weakened immune systems or those 50 years and older     Health Maintenance Due:      Topic Date Due   • Colorectal Cancer Screening  04/02/2024   • Hepatitis C Screening  Completed     Immunizations Due:      Topic Date Due   • Pneumococcal Vaccine: 65+ Years (3 of 3 - PPSV23 or PCV20) 05/19/2023   • COVID-19 Vaccine (4 - 2023-24 season) 09/01/2024   • Influenza Vaccine (1) 09/01/2024     Advance Directives   What are advance directives?  Advance directives are legal documents that state your wishes and plans for medical care. These plans are made ahead of time in case you lose your ability to make decisions for yourself. Advance directives can apply to any medical decision, such as the treatments you want, and if you want to donate organs.   What are the types of advance directives?  There are many types of advance directives, and each state has rules about how to use them. You may choose a combination of any of the following:  Living  will:  This is a written record of the treatment you want. You can also choose which treatments you do not want, which to limit, and which to stop at a certain time. This includes surgery, medicine, IV fluid, and tube feedings.   Durable power of  for healthcare (DPAHC):  This is a written record that states who you want to make healthcare choices for you when you are unable to make them for yourself. This person, called a proxy, is usually a family member or a friend. You may choose more than 1 proxy.  Do not resuscitate (DNR) order:  A DNR order is used in case your heart stops beating or you stop breathing. It is a request not to have certain forms of treatment, such as CPR. A DNR order may be included in other types of advance directives.  Medical directive:  This covers the care that you want if you are in a coma, near death, or unable to make decisions for yourself. You can list the treatments you want for each condition. Treatment may include pain medicine, surgery, blood transfusions, dialysis, IV or tube feedings, and a ventilator (breathing machine).  Values history:  This document has questions about your views, beliefs, and how you feel and think about life. This information can help others choose the care that you would choose.  Why are advance directives important?  An advance directive helps you control your care. Although spoken wishes may be used, it is better to have your wishes written down. Spoken wishes can be misunderstood, or not followed. Treatments may be given even if you do not want them. An advance directive may make it easier for your family to make difficult choices about your care.   Weight Management   Why it is important to manage your weight:  Being overweight increases your risk of health conditions such as heart disease, high blood pressure, type 2 diabetes, and certain types of cancer. It can also increase your risk for osteoarthritis, sleep apnea, and other respiratory  problems. Aim for a slow, steady weight loss. Even a small amount of weight loss can lower your risk of health problems.  How to lose weight safely:  A safe and healthy way to lose weight is to eat fewer calories and get regular exercise. You can lose up about 1 pound a week by decreasing the number of calories you eat by 500 calories each day.   Healthy meal plan for weight management:  A healthy meal plan includes a variety of foods, contains fewer calories, and helps you stay healthy. A healthy meal plan includes the following:  Eat whole-grain foods more often.  A healthy meal plan should contain fiber. Fiber is the part of grains, fruits, and vegetables that is not broken down by your body. Whole-grain foods are healthy and provide extra fiber in your diet. Some examples of whole-grain foods are whole-wheat breads and pastas, oatmeal, brown rice, and bulgur.  Eat a variety of vegetables every day.  Include dark, leafy greens such as spinach, kale, esha greens, and mustard greens. Eat yellow and orange vegetables such as carrots, sweet potatoes, and winter squash.   Eat a variety of fruits every day.  Choose fresh or canned fruit (canned in its own juice or light syrup) instead of juice. Fruit juice has very little or no fiber.  Eat low-fat dairy foods.  Drink fat-free (skim) milk or 1% milk. Eat fat-free yogurt and low-fat cottage cheese. Try low-fat cheeses such as mozzarella and other reduced-fat cheeses.  Choose meat and other protein foods that are low in fat.  Choose beans or other legumes such as split peas or lentils. Choose fish, skinless poultry (chicken or turkey), or lean cuts of red meat (beef or pork). Before you cook meat or poultry, cut off any visible fat.   Use less fat and oil.  Try baking foods instead of frying them. Add less fat, such as margarine, sour cream, regular salad dressing and mayonnaise to foods. Eat fewer high-fat foods. Some examples of high-fat foods include french fries,  doughnuts, ice cream, and cakes.  Eat fewer sweets.  Limit foods and drinks that are high in sugar. This includes candy, cookies, regular soda, and sweetened drinks.  Exercise:  Exercise at least 30 minutes per day on most days of the week. Some examples of exercise include walking, biking, dancing, and swimming. You can also fit in more physical activity by taking the stairs instead of the elevator or parking farther away from stores. Ask your healthcare provider about the best exercise plan for you.      © Copyright [employer] 2018 Information is for End User's use only and may not be sold, redistributed or otherwise used for commercial purposes. All illustrations and images included in CareNotes® are the copyrighted property of A.D.A.M., Inc. or Service[employer]

## 2024-09-06 ENCOUNTER — TELEPHONE (OUTPATIENT)
Age: 69
End: 2024-09-06

## 2024-09-06 DIAGNOSIS — I50.32 CHRONIC DIASTOLIC HEART FAILURE (HCC): ICD-10-CM

## 2024-09-06 DIAGNOSIS — E10.65 TYPE 1 DIABETES MELLITUS WITH HYPERGLYCEMIA (HCC): Primary | ICD-10-CM

## 2024-09-06 RX ORDER — INSULIN LISPRO 100 [IU]/ML
INJECTION, SOLUTION INTRAVENOUS; SUBCUTANEOUS
Qty: 30 ML | Refills: 0 | Status: SHIPPED | OUTPATIENT
Start: 2024-09-06

## 2024-09-06 RX ORDER — INSULIN LISPRO 100 [IU]/ML
INJECTION, SOLUTION INTRAVENOUS; SUBCUTANEOUS
Qty: 30 ML | Refills: 2 | Status: SHIPPED | OUTPATIENT
Start: 2024-09-06 | End: 2024-09-06 | Stop reason: SDUPTHER

## 2024-09-06 NOTE — TELEPHONE ENCOUNTER
Daughter states patient uses insulin lispro pens and  never used vials and vials were sent to the pharmacy. States patient is also on 8U tid and not 5U.Patient is very frustrated and upset. States she has sent MyChart message and multiple calls and patient has been out of insulin since Tuesday. Please advise, thank you.

## 2024-09-06 NOTE — TELEPHONE ENCOUNTER
Received warm transfer from access center and spoke with patients daughter Arpita regarding refill for humalog. States that she is very frustrated that refill was not sent to pharmacy. Informed daughter that I would notify provider immediately and will call with update one RX sent. Offered sample pen from office stock to hold patient over until refill sent. Daughter declined sample and states that she is not able to come in to the office or have care giver come in since she is at work. Urgent message sent to provider.

## 2024-09-08 ENCOUNTER — TELEPHONE (OUTPATIENT)
Dept: OTHER | Facility: HOSPITAL | Age: 69
End: 2024-09-08

## 2024-09-08 ENCOUNTER — NURSE TRIAGE (OUTPATIENT)
Dept: OTHER | Facility: OTHER | Age: 69
End: 2024-09-08

## 2024-09-08 DIAGNOSIS — E10.65 TYPE 1 DIABETES MELLITUS WITH HYPERGLYCEMIA (HCC): ICD-10-CM

## 2024-09-08 NOTE — TELEPHONE ENCOUNTER
Reason for Disposition  • Blood glucose > 400 mg/dL (22.2 mmol/L)    Protocols used: Diabetes - High Blood Sugar-ADULT-AH

## 2024-09-08 NOTE — TELEPHONE ENCOUNTER
"Answer Assessment - Initial Assessment Questions  1. BLOOD GLUCOSE: \"What is your blood glucose level?\"   Highest today was 540 at noon  Now 504  Using fingerstick because DexCom would not read    2. ONSET: \"When did you check the blood glucose?\"      As directed    3. USUAL RANGE: \"What is your glucose level usually?\" (e.g., usual fasting morning value, usual evening value)      Usually runs 200-300    4. KETONES: \"Do you check for ketones (urine or blood test strips)?\" If yes, ask: \"What does the test show now?\"       N/A    5. TYPE 1 or 2:  \"Do you know what type of diabetes you have?\"  (e.g., Type 1, Type 2, Gestational; doesn't know)       Type 2    6. INSULIN: \"Do you take insulin?\" \"What type of insulin(s) do you use? What is the mode of delivery? (syringe, pen; injection or pump)?\"       Yes    7. DIABETES PILLS: \"Do you take any pills for your diabetes?\" If yes, ask: \"Have you missed taking any pills recently?\"      N/A    8. OTHER SYMPTOMS: \"Do you have any symptoms?\" (e.g., fever, frequent urination, difficulty breathing, dizziness, weakness, vomiting)      Headache; decreased appetite, cold    Protocols used: Diabetes - High Blood Sugar-ADULT-    Patient's daughter adamant that patient will not do to the emergency room.    Provider following up with patient/daughter directly.  "

## 2024-09-08 NOTE — TELEPHONE ENCOUNTER
Received call from AMBROSIO Guerra via [facility].  I spoke with daughter Ofelia via phone.    Patient last night had alert of glucose 75 on CGM and fingerstick checked showed glucose in the 60s.  Patient consumed orange juice and 2 glucose tabs.  Improved sugars initially to the 100s and then spiked to 300 early morning.  During morning and afternoon, difficult to control sugars up to 500s x 2.  Patient is adamant about not wanting to arrive at emergency department, refusing to arrive for evaluation.  Patient does report feeling unwell with a headache, decreased appetite and cold.  Daughter reports that patient has been receiving his 8 units of mealtime insulin with added correctional insulin but still remains above goal.  Given patient's hesitancy to go to the emergency department, daughter inquiring on ability to go above his recommended total daily dose of 40 units of short acting insulin.  I did mention to Ofelia that for his next mealtime insulin she can add correctional insulin even if it surpasses the daily total dose of 40.    However, I did discuss with Ofelia that if patient remains above 300 patient should arrive at the nearest emergency department.  In the meantime, did recommend that next meal be high in protein and to make carb conscious decisions on food intake.  Also, recommending to perform some sort of physical activity such as walking at home, however, daughter does report that patient has some limitations.

## 2024-09-08 NOTE — TELEPHONE ENCOUNTER
"Regarding: High blood sugar  ----- Message from Cecile MAYER sent at 9/8/2024  1:37 PM EDT -----  \" My dads sugar has been high 547. Its now 504 after giving giving him 16 units. I have a question about exceeding his insulin  limit\"    "

## 2024-09-09 DIAGNOSIS — E10.65 TYPE 1 DIABETES MELLITUS WITH HYPERGLYCEMIA (HCC): ICD-10-CM

## 2024-09-09 RX ORDER — INSULIN LISPRO 100 [IU]/ML
INJECTION, SOLUTION INTRAVENOUS; SUBCUTANEOUS
Qty: 30 ML | Refills: 3 | Status: SHIPPED | OUTPATIENT
Start: 2024-09-09 | End: 2024-09-10

## 2024-09-10 DIAGNOSIS — E10.65 TYPE 1 DIABETES MELLITUS WITH HYPERGLYCEMIA (HCC): ICD-10-CM

## 2024-09-10 RX ORDER — INSULIN LISPRO 100 [IU]/ML
INJECTION, SOLUTION INTRAVENOUS; SUBCUTANEOUS
Qty: 45 ML | Refills: 1 | Status: SHIPPED | OUTPATIENT
Start: 2024-09-10

## 2024-09-10 RX ORDER — INSULIN GLARGINE 300 U/ML
15 INJECTION, SOLUTION SUBCUTANEOUS
Qty: 6 ML | Refills: 1 | Status: SHIPPED | OUTPATIENT
Start: 2024-09-10 | End: 2024-09-10

## 2024-09-10 RX ORDER — INSULIN GLARGINE 300 U/ML
18 INJECTION, SOLUTION SUBCUTANEOUS
Qty: 6 ML | Refills: 1 | Status: SHIPPED | OUTPATIENT
Start: 2024-09-10

## 2024-09-10 NOTE — TELEPHONE ENCOUNTER
Called patient and spoke with daughter and informed her that RX was sent to pharmacy. Patients daughter verbalized understanding.

## 2024-09-15 DIAGNOSIS — E27.40 ADRENAL INSUFFICIENCY (HCC): ICD-10-CM

## 2024-09-17 RX ORDER — FLUDROCORTISONE ACETATE 0.1 MG/1
0.2 TABLET ORAL DAILY
Qty: 60 TABLET | Refills: 2 | Status: SHIPPED | OUTPATIENT
Start: 2024-09-17

## 2024-10-05 PROBLEM — Z00.00 MEDICARE ANNUAL WELLNESS VISIT, SUBSEQUENT: Status: RESOLVED | Noted: 2024-09-05 | Resolved: 2024-10-05

## 2024-10-08 DIAGNOSIS — I21.A1 TYPE 2 MI (MYOCARDIAL INFARCTION) (HCC): ICD-10-CM

## 2024-10-09 DIAGNOSIS — R60.0 LEG EDEMA: ICD-10-CM

## 2024-10-09 RX ORDER — FUROSEMIDE 20 MG
20 TABLET ORAL 3 TIMES WEEKLY
Qty: 36 TABLET | Refills: 1 | Status: SHIPPED | OUTPATIENT
Start: 2024-10-09

## 2024-11-03 DIAGNOSIS — E27.40 ADRENAL INSUFFICIENCY (HCC): ICD-10-CM

## 2024-11-03 DIAGNOSIS — E10.649 TYPE 1 DIABETES MELLITUS WITH HYPOGLYCEMIA AND WITHOUT COMA (HCC): ICD-10-CM

## 2024-11-03 DIAGNOSIS — I21.A1 TYPE 2 MI (MYOCARDIAL INFARCTION) (HCC): ICD-10-CM

## 2024-11-04 RX ORDER — ACYCLOVIR 400 MG/1
1 TABLET ORAL
Qty: 9 EACH | Refills: 1 | Status: SHIPPED | OUTPATIENT
Start: 2024-11-04

## 2024-11-06 RX ORDER — FLUDROCORTISONE ACETATE 0.1 MG/1
0.2 TABLET ORAL DAILY
Qty: 60 TABLET | Refills: 0 | Status: SHIPPED | OUTPATIENT
Start: 2024-11-06

## 2024-11-18 ENCOUNTER — TELEPHONE (OUTPATIENT)
Age: 69
End: 2024-11-18

## 2024-11-18 NOTE — TELEPHONE ENCOUNTER
Patient's daughter  has requested the visit on 11/20/24 to be virtual care visit again.  She is the care giver for her father and she works a full time job.    Please contact daughter back if this is not able to work out. [phone] (Mobile).

## 2024-11-18 NOTE — TELEPHONE ENCOUNTER
LVM for daughter, Ofelia matt per Dr. Butts to do Virtual Visit. Asked daughter to return call if they would like it to be via text or MYC.

## 2024-11-20 ENCOUNTER — TELEMEDICINE (OUTPATIENT)
Dept: ENDOCRINOLOGY | Facility: CLINIC | Age: 69
End: 2024-11-20
Payer: COMMERCIAL

## 2024-11-20 DIAGNOSIS — E10.65 TYPE 1 DIABETES MELLITUS WITH HYPERGLYCEMIA (HCC): ICD-10-CM

## 2024-11-20 DIAGNOSIS — E10.9 BRITTLE DIABETES MELLITUS (HCC): ICD-10-CM

## 2024-11-20 DIAGNOSIS — E27.40 ADRENAL INSUFFICIENCY (HCC): Primary | ICD-10-CM

## 2024-11-20 DIAGNOSIS — E55.9 VITAMIN D DEFICIENCY: ICD-10-CM

## 2024-11-20 DIAGNOSIS — E10.43 TYPE 1 DIABETES MELLITUS WITH DIABETIC AUTONOMIC (POLY)NEUROPATHY (HCC): ICD-10-CM

## 2024-11-20 PROCEDURE — 99214 OFFICE O/P EST MOD 30 MIN: CPT | Performed by: INTERNAL MEDICINE

## 2024-11-20 PROCEDURE — 95251 CONT GLUC MNTR ANALYSIS I&R: CPT | Performed by: INTERNAL MEDICINE

## 2024-11-20 RX ORDER — SERTRALINE HYDROCHLORIDE 100 MG/1
100 TABLET, FILM COATED ORAL DAILY
COMMUNITY

## 2024-11-20 NOTE — ASSESSMENT & PLAN NOTE
He is stable on Hydrocortisone 20mg Am and 15mg pm plus fludrocorisone 0.1mg daily.    Double dose for acute sickness/stress.    Follow up in 3-6 months.

## 2024-11-20 NOTE — ASSESSMENT & PLAN NOTE
He is uncontrolled but stable compared to last AGP.    Discussed options and agreed with pt/dtr to focus on carb consistent diet rather than increase insulin. Goal A1c is 8%.  We agreed to continue current regimen [id number] units qhs and Humalog KwikPen 8-8-8 units with each meal plus correctional 1u/50mg/dL. Would avoid risk of hypoglycemia.    Repeat labs and follow up in 3 months.    Lab Results   Component Value Date    HGBA1C 7.0 (H) 06/13/2024

## 2024-11-20 NOTE — PROGRESS NOTES
REQUIRED DOCUMENTATION:      1. This service was provided via Telemedicine -[facility]  2. Provider located at [place], [place].  3. TeleMed provider: Lewis Butts MD.  4. Identify all parties in room with patient during tele consult:  5.Patient was then informed that this was a Telemedicine visit and that the exam was being conducted confidentially over secure lines. My office door was closed. No one else was in the room.  Patient acknowledged consent and understanding of privacy and security of the Telemedicine visit, and gave us permission to have the assistant stay in the room in order to assist with the history and to conduct the exam.  I informed the patient that I have reviewed their record in Epic and presented the opportunity for them to ask any questions regarding the visit today.  The patient agreed to participate.     Patient is aware this is a billable service.       Follow-up Patient Progress Note      CC: type 1 diabetes     History of Present Illness:   67 yr male with Type 1 diabetes since age 39yr with brittle diabetes, hypertension, obesity, hyperlipidemia, atrial fibrillation, orthostasis, anxiety/depression, anemia, adrenal insufficiency and coronary artery disease.  Last visit was 4/30/2024.     He seems to be doing well. Weight was 167 lbs.    CGM data review::  Device: dexcom          Dates:  4/13/24-4/26/24             Usage: 88.7 %   Av glu: 225 mg/dL                   SD: mg/dL      CV:  28.4 %            GMI: 8.7  %  TIR: 22 %                    TAR: 45+33 %             TBR: 0 %     Glycemic patters:  predominantly hyperglycemia fasting and postprandial. Rare pp hpoglycemia     Current meds:  [id number] units q.h.s.  Humalog KwikPen 8-8-8 units with each meal plus correctional 1u/50mg/dL.  Hydrocortisone 20 mg am and 15 mg pm dose (aware to double dose for acute stressful state)  Fludrocortisone 0.2 mg daily     Opthamology: yes  Podiatry: no  vaccination: yes  Dental:  Pancreatitis:  no     Ace/ARB: no  Statin:  Lipitor  Thyroid issues:  No      Physical Exam:  There is no height or weight on file to calculate BMI.  There were no vitals taken for this visit.   There were no vitals filed for this visit.     Physical Exam  Constitutional:       General: He is not in acute distress.     Appearance: He is well-developed.   HENT:      Head: Normocephalic and atraumatic.      Nose: Nose normal.   Eyes:      Conjunctiva/sclera: Conjunctivae normal.   Pulmonary:      Effort: Pulmonary effort is normal.   Abdominal:      General: There is no distension.   Musculoskeletal:      Cervical back: Normal range of motion and neck supple.   Skin:     Findings: No rash.      Comments: No icterus   Neurological:      Mental Status: He is alert and oriented to person, place, and time.         Labs:   Lab Results   Component Value Date    HGBA1C 7.0 (H) 06/13/2024       Lab Results   Component Value Date    [id number] 1.742 03/12/2024    TSH 0.50 08/17/2022       Lab Results   Component Value Date    CREATININE 1.22 08/27/2024    CREATININE 1.28 06/13/2024    CREATININE 1.24 03/14/2024    BUN 24 08/27/2024    K 4.9 08/27/2024    CL 98 08/27/2024    CO2 27 08/27/2024     GFR, Calculated   Date Value Ref Range Status   12/01/2020 56 (L) >60 mL/min/1.73m2 Final     Comment:     mL/min per 1.73 square meters                                            Normal Function or Mild Renal    Disease (if clinically at risk):  >or=60  Moderately Decreased:                30-59  Severely Decreased:                  15-29  Renal Failure:                         <15                                            -American GFR: multiply reported GFR by 1.16    Please note that the eGFR is based on the CKD-EPI calculation, and is not intended to be used for drug dosing.                                            Note: Calculated GFR may not be an accurate indicator of renal function if the patient's renal function is not in a  "steady state.     eGFRcr   Date Value Ref Range Status   01/05/2024 77 >59 Final     eGFR   Date Value Ref Range Status   08/27/2024 60 ml/min/1.73sq m Final       Lab Results   Component Value Date    ALT 40 06/13/2024    AST 35 06/13/2024    ALKPHOS 103 06/13/2024       No results found for: \"CHOLESTEROL\"  No results found for: \"HDL\"  No results found for: \"TRIG\"  No results found for: \"NONHDLC\"      Assessment/Plan:    1. Adrenal insufficiency (HCC)  Assessment & Plan:  He is stable on Hydrocortisone 20mg Am and 15mg pm plus fludrocorisone 0.1mg daily.    Double dose for acute sickness/stress.    Follow up in 3-6 months.  2. Type 1 diabetes mellitus with diabetic autonomic (poly)neuropathy (HCC)  -     Hemoglobin A1C; Future  3. Type 1 diabetes mellitus with hyperglycemia (HCC)  Assessment & Plan:  He is uncontrolled but stable compared to last AGP.    Discussed options and agreed with pt/dtr to focus on carb consistent diet rather than increase insulin. Goal A1c is 8%.  We agreed to continue current regimen [id number] units qhs and Humalog KwikPen 8-8-8 units with each meal plus correctional 1u/50mg/dL. Would avoid risk of hypoglycemia.    Repeat labs and follow up in 3 months.    Lab Results   Component Value Date    HGBA1C 7.0 (H) 06/13/2024     4. 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HCC)  5. Vitamin D deficiency        I have spent a total time of  minutes on 11/20/24 in caring for this patient including greater than 50% of this time was spent in counseling/coordination of care as listed above.       Discussed with the patient and all questioned fully answered. He will contact me with concerns.    Lewis Butts"

## 2024-12-06 ENCOUNTER — VBI (OUTPATIENT)
Dept: ADMINISTRATIVE | Facility: OTHER | Age: 69
End: 2024-12-06

## 2024-12-06 NOTE — TELEPHONE ENCOUNTER
12/06/24 1:51 PM    Patient was flagged by a Third Party Payer report as being due for a refill on the following medications, Atorvastatin Calcium 10 MG . Patient was contacted to review these medications. There was no answer and a message was left.     Thank you.  Shruthi Yao MA  [facility]

## 2024-12-11 NOTE — CASE COMMUNICATION
Patient not fasting for labs today and didnt want them drawn. SN suggested having mobile lab from [facility] come grab labs when they can. or take patient OP.    FYI
Quality 226: Preventive Care And Screening: Tobacco Use: Screening And Cessation Intervention: Patient screened for tobacco use and is an ex/non-smoker
Detail Level: Detailed
Quality 431: Preventive Care And Screening: Unhealthy Alcohol Use - Screening: Patient not identified as an unhealthy alcohol user when screened for unhealthy alcohol use using a systematic screening method
Quality 394a: Meningococcal Immunizations For Adolescents: Patient had one dose of meningococcal vaccine (serogroups A, C, W, Y) on or between the patient's 11th and 13th birthdays.

## 2024-12-13 NOTE — TELEPHONE ENCOUNTER
12/13/24 2:41 PM    Patient was flagged by a Third Party Payer report as being due for a refill on the following medications, Atorvastatin Calcium 10 MG . Patient was contacted to review these medications. Patient stated he will be picking up refill shortly. No further action is required.     Thank you.  Shruthi Yao MA  [facility]

## 2024-12-24 ENCOUNTER — TELEPHONE (OUTPATIENT)
Dept: ADMINISTRATIVE | Facility: OTHER | Age: 69
End: 2024-12-24

## 2024-12-24 DIAGNOSIS — E10.9 INSULIN DEPENDENT TYPE 1 DIABETES MELLITUS (HCC): ICD-10-CM

## 2024-12-24 NOTE — TELEPHONE ENCOUNTER
12/24/24 10:58 AM    Patient was flagged by a Third Party Payer report as being due for a refill on the following medications,   Atorvastatin . Patient was contacted to review these medications. Inbox full/ phone number disconnected; a message could not be left for the patient.     Thank you.  Jair Ortiz MA  [facility]

## 2024-12-24 NOTE — TELEPHONE ENCOUNTER
"Patients daughter called in regards to patients insulin pen needles.    Patients daughter states he only has enough until Thursday morning. States \"the Dr was supposed to send prescriptions at the last visit\"    Please send     Insulin Pen Needle (BD Pen Needle Josie 2nd Gen) 32G X 4 MM MISC   To [id number] - [place], [place] -     Please advise patients daughter when prescription is sent.  "

## 2024-12-26 RX ORDER — PEN NEEDLE, DIABETIC 32GX 5/32"
NEEDLE, DISPOSABLE MISCELLANEOUS
Qty: 200 EACH | Refills: 3 | Status: SHIPPED | OUTPATIENT
Start: 2024-12-26

## 2024-12-26 NOTE — TELEPHONE ENCOUNTER
Pt's daughter called in again stating that pt has not received pen needles yet. Please submit prescription into pharmacy as soon as possible. Pt doesn't have any for next dose.

## 2025-01-07 ENCOUNTER — HOSPITAL ENCOUNTER (INPATIENT)
Facility: HOSPITAL | Age: 70
LOS: 3 days | Discharge: NURSING, REHAB OR LONG-TERM CARE | DRG: 312 | End: 2025-01-11
Attending: EMERGENCY MEDICINE | Admitting: INTERNAL MEDICINE
Payer: COMMERCIAL

## 2025-01-07 ENCOUNTER — APPOINTMENT (EMERGENCY)
Dept: RADIOLOGY | Facility: HOSPITAL | Age: 70
DRG: 312 | End: 2025-01-07
Payer: COMMERCIAL

## 2025-01-07 DIAGNOSIS — I50.32 CHRONIC DIASTOLIC HEART FAILURE (HCC): ICD-10-CM

## 2025-01-07 DIAGNOSIS — R60.0 LEG EDEMA: ICD-10-CM

## 2025-01-07 DIAGNOSIS — E10.65 TYPE 1 DIABETES MELLITUS WITH HYPERGLYCEMIA (HCC): ICD-10-CM

## 2025-01-07 DIAGNOSIS — R55 SYNCOPE: Primary | ICD-10-CM

## 2025-01-07 PROBLEM — D72.829 LEUKOCYTOSIS: Status: ACTIVE | Noted: 2025-01-07

## 2025-01-07 LAB
ALBUMIN SERPL BCG-MCNC: 3.3 G/DL (ref 3.5–5)
ALP SERPL-CCNC: 78 U/L (ref 34–104)
ALT SERPL W P-5'-P-CCNC: 11 U/L (ref 7–52)
ANION GAP SERPL CALCULATED.3IONS-SCNC: 9 MMOL/L (ref 4–13)
AST SERPL W P-5'-P-CCNC: 12 U/L (ref 13–39)
ATRIAL RATE: 500 BPM
ATRIAL RATE: 82 BPM
BACTERIA UR QL AUTO: ABNORMAL /HPF
BASOPHILS # BLD AUTO: 0.03 THOUSANDS/ΜL (ref 0–0.1)
BASOPHILS NFR BLD AUTO: 0 % (ref 0–1)
BILIRUB SERPL-MCNC: 0.91 MG/DL (ref 0.2–1)
BILIRUB UR QL STRIP: NEGATIVE
BUN SERPL-MCNC: 22 MG/DL (ref 5–25)
CALCIUM ALBUM COR SERPL-MCNC: 8.4 MG/DL (ref 8.3–10.1)
CALCIUM SERPL-MCNC: 7.8 MG/DL (ref 8.4–10.2)
CHLORIDE SERPL-SCNC: 105 MMOL/L (ref 96–108)
CLARITY UR: CLEAR
CO2 SERPL-SCNC: 22 MMOL/L (ref 21–32)
COLOR UR: ABNORMAL
CREAT SERPL-MCNC: 1.04 MG/DL (ref 0.6–1.3)
EOSINOPHIL # BLD AUTO: 0.13 THOUSAND/ΜL (ref 0–0.61)
EOSINOPHIL NFR BLD AUTO: 1 % (ref 0–6)
ERYTHROCYTE [DISTWIDTH] IN BLOOD BY AUTOMATED COUNT: 14.4 % (ref 11.6–15.1)
FLUAV RNA RESP QL NAA+PROBE: NEGATIVE
FLUBV RNA RESP QL NAA+PROBE: NEGATIVE
GFR SERPL CREATININE-BSD FRML MDRD: 73 ML/MIN/1.73SQ M
GLUCOSE SERPL-MCNC: 145 MG/DL (ref 65–140)
GLUCOSE SERPL-MCNC: 266 MG/DL (ref 65–140)
GLUCOSE SERPL-MCNC: 282 MG/DL (ref 65–140)
GLUCOSE SERPL-MCNC: 360 MG/DL (ref 65–140)
GLUCOSE UR STRIP-MCNC: ABNORMAL MG/DL
HCT VFR BLD AUTO: 39.2 % (ref 36.5–49.3)
HGB BLD-MCNC: 12.5 G/DL (ref 12–17)
HGB UR QL STRIP.AUTO: NEGATIVE
HYALINE CASTS #/AREA URNS LPF: ABNORMAL /LPF
IMM GRANULOCYTES # BLD AUTO: 0.09 THOUSAND/UL (ref 0–0.2)
IMM GRANULOCYTES NFR BLD AUTO: 1 % (ref 0–2)
KETONES UR STRIP-MCNC: ABNORMAL MG/DL
LACTATE SERPL-SCNC: 2.3 MMOL/L (ref 0.5–2)
LEUKOCYTE ESTERASE UR QL STRIP: NEGATIVE
LYMPHOCYTES # BLD AUTO: 1.95 THOUSANDS/ΜL (ref 0.6–4.47)
LYMPHOCYTES NFR BLD AUTO: 15 % (ref 14–44)
MAGNESIUM SERPL-MCNC: 1.7 MG/DL (ref 1.9–2.7)
MCH RBC QN AUTO: 32.1 PG (ref 26.8–34.3)
MCHC RBC AUTO-ENTMCNC: 31.9 G/DL (ref 31.4–37.4)
MCV RBC AUTO: 101 FL (ref 82–98)
MONOCYTES # BLD AUTO: 1.9 THOUSAND/ΜL (ref 0.17–1.22)
MONOCYTES NFR BLD AUTO: 14 % (ref 4–12)
NEUTROPHILS # BLD AUTO: 9.17 THOUSANDS/ΜL (ref 1.85–7.62)
NEUTS SEG NFR BLD AUTO: 69 % (ref 43–75)
NITRITE UR QL STRIP: NEGATIVE
NON-SQ EPI CELLS URNS QL MICRO: ABNORMAL /HPF
NRBC BLD AUTO-RTO: 0 /100 WBCS
P AXIS: 248 DEGREES
PH UR STRIP.AUTO: 5.5 [PH]
PLATELET # BLD AUTO: 135 THOUSANDS/UL (ref 149–390)
PMV BLD AUTO: 11.3 FL (ref 8.9–12.7)
POTASSIUM SERPL-SCNC: 4.3 MMOL/L (ref 3.5–5.3)
PROT SERPL-MCNC: 5.8 G/DL (ref 6.4–8.4)
PROT UR STRIP-MCNC: NEGATIVE MG/DL
QRS AXIS: 59 DEGREES
QRS AXIS: 61 DEGREES
QRSD INTERVAL: 64 MS
QRSD INTERVAL: 64 MS
QT INTERVAL: 356 MS
QT INTERVAL: 368 MS
QTC INTERVAL: 430 MS
QTC INTERVAL: 442 MS
RBC # BLD AUTO: 3.9 MILLION/UL (ref 3.88–5.62)
RBC #/AREA URNS AUTO: ABNORMAL /HPF
RSV RNA RESP QL NAA+PROBE: NEGATIVE
SARS-COV-2 RNA RESP QL NAA+PROBE: NEGATIVE
SODIUM SERPL-SCNC: 136 MMOL/L (ref 135–147)
SP GR UR STRIP.AUTO: 1.01 (ref 1–1.03)
T WAVE AXIS: -13 DEGREES
T WAVE AXIS: -47 DEGREES
UROBILINOGEN UR STRIP-ACNC: <2 MG/DL
VENTRICULAR RATE: 87 BPM
VENTRICULAR RATE: 88 BPM
WBC # BLD AUTO: 13.27 THOUSAND/UL (ref 4.31–10.16)
WBC #/AREA URNS AUTO: ABNORMAL /HPF

## 2025-01-07 PROCEDURE — 93005 ELECTROCARDIOGRAM TRACING: CPT

## 2025-01-07 PROCEDURE — 93010 ELECTROCARDIOGRAM REPORT: CPT | Performed by: INTERNAL MEDICINE

## 2025-01-07 PROCEDURE — 87040 BLOOD CULTURE FOR BACTERIA: CPT | Performed by: INTERNAL MEDICINE

## 2025-01-07 PROCEDURE — 99284 EMERGENCY DEPT VISIT MOD MDM: CPT

## 2025-01-07 PROCEDURE — 99285 EMERGENCY DEPT VISIT HI MDM: CPT | Performed by: EMERGENCY MEDICINE

## 2025-01-07 PROCEDURE — 96368 THER/DIAG CONCURRENT INF: CPT

## 2025-01-07 PROCEDURE — 99223 1ST HOSP IP/OBS HIGH 75: CPT | Performed by: INTERNAL MEDICINE

## 2025-01-07 PROCEDURE — 36415 COLL VENOUS BLD VENIPUNCTURE: CPT

## 2025-01-07 PROCEDURE — 0241U HB NFCT DS VIR RESP RNA 4 TRGT: CPT | Performed by: INTERNAL MEDICINE

## 2025-01-07 PROCEDURE — 96367 TX/PROPH/DG ADDL SEQ IV INF: CPT

## 2025-01-07 PROCEDURE — 80053 COMPREHEN METABOLIC PANEL: CPT

## 2025-01-07 PROCEDURE — 96365 THER/PROPH/DIAG IV INF INIT: CPT

## 2025-01-07 PROCEDURE — 83735 ASSAY OF MAGNESIUM: CPT

## 2025-01-07 PROCEDURE — 87154 CUL TYP ID BLD PTHGN 6+ TRGT: CPT | Performed by: INTERNAL MEDICINE

## 2025-01-07 PROCEDURE — 71046 X-RAY EXAM CHEST 2 VIEWS: CPT

## 2025-01-07 PROCEDURE — 96366 THER/PROPH/DIAG IV INF ADDON: CPT

## 2025-01-07 PROCEDURE — 85025 COMPLETE CBC W/AUTO DIFF WBC: CPT

## 2025-01-07 PROCEDURE — 83605 ASSAY OF LACTIC ACID: CPT | Performed by: INTERNAL MEDICINE

## 2025-01-07 PROCEDURE — 81001 URINALYSIS AUTO W/SCOPE: CPT | Performed by: INTERNAL MEDICINE

## 2025-01-07 PROCEDURE — 94760 N-INVAS EAR/PLS OXIMETRY 1: CPT

## 2025-01-07 PROCEDURE — 82948 REAGENT STRIP/BLOOD GLUCOSE: CPT

## 2025-01-07 RX ORDER — LANOLIN ALCOHOL/MO/W.PET/CERES
400 CREAM (GRAM) TOPICAL 2 TIMES DAILY
Status: DISCONTINUED | OUTPATIENT
Start: 2025-01-07 | End: 2025-01-11 | Stop reason: HOSPADM

## 2025-01-07 RX ORDER — INSULIN GLARGINE 100 [IU]/ML
18 INJECTION, SOLUTION SUBCUTANEOUS
Status: DISCONTINUED | OUTPATIENT
Start: 2025-01-07 | End: 2025-01-09

## 2025-01-07 RX ORDER — HYDROCORTISONE 20 MG/1
40 TABLET ORAL EVERY MORNING
Status: DISCONTINUED | OUTPATIENT
Start: 2025-01-08 | End: 2025-01-08

## 2025-01-07 RX ORDER — INSULIN LISPRO 100 [IU]/ML
8 INJECTION, SOLUTION INTRAVENOUS; SUBCUTANEOUS
Status: DISCONTINUED | OUTPATIENT
Start: 2025-01-08 | End: 2025-01-09

## 2025-01-07 RX ORDER — INSULIN LISPRO 100 [IU]/ML
4 INJECTION, SOLUTION INTRAVENOUS; SUBCUTANEOUS
Status: DISCONTINUED | OUTPATIENT
Start: 2025-01-07 | End: 2025-01-07

## 2025-01-07 RX ORDER — INSULIN LISPRO 100 [IU]/ML
1-6 INJECTION, SOLUTION INTRAVENOUS; SUBCUTANEOUS
Status: DISCONTINUED | OUTPATIENT
Start: 2025-01-07 | End: 2025-01-11 | Stop reason: HOSPADM

## 2025-01-07 RX ORDER — SERTRALINE HYDROCHLORIDE 100 MG/1
100 TABLET, FILM COATED ORAL DAILY
Status: DISCONTINUED | OUTPATIENT
Start: 2025-01-07 | End: 2025-01-11 | Stop reason: HOSPADM

## 2025-01-07 RX ORDER — ACETAMINOPHEN 325 MG/1
650 TABLET ORAL EVERY 6 HOURS PRN
Status: DISCONTINUED | OUTPATIENT
Start: 2025-01-07 | End: 2025-01-11 | Stop reason: HOSPADM

## 2025-01-07 RX ORDER — SODIUM CHLORIDE 9 MG/ML
75 INJECTION, SOLUTION INTRAVENOUS CONTINUOUS
Status: DISCONTINUED | OUTPATIENT
Start: 2025-01-07 | End: 2025-01-08

## 2025-01-07 RX ORDER — INSULIN LISPRO 100 [IU]/ML
5 INJECTION, SOLUTION INTRAVENOUS; SUBCUTANEOUS
Status: DISCONTINUED | OUTPATIENT
Start: 2025-01-07 | End: 2025-01-07

## 2025-01-07 RX ORDER — ATORVASTATIN CALCIUM 10 MG/1
10 TABLET, FILM COATED ORAL
Status: DISCONTINUED | OUTPATIENT
Start: 2025-01-07 | End: 2025-01-11 | Stop reason: HOSPADM

## 2025-01-07 RX ORDER — ALBUTEROL SULFATE 0.83 MG/ML
2.5 SOLUTION RESPIRATORY (INHALATION) EVERY 6 HOURS PRN
Status: DISCONTINUED | OUTPATIENT
Start: 2025-01-07 | End: 2025-01-07

## 2025-01-07 RX ORDER — MIDODRINE HYDROCHLORIDE 5 MG/1
5 TABLET ORAL
Status: DISCONTINUED | OUTPATIENT
Start: 2025-01-07 | End: 2025-01-11 | Stop reason: HOSPADM

## 2025-01-07 RX ORDER — SODIUM CHLORIDE, SODIUM GLUCONATE, SODIUM ACETATE, POTASSIUM CHLORIDE, MAGNESIUM CHLORIDE, SODIUM PHOSPHATE, DIBASIC, AND POTASSIUM PHOSPHATE .53; .5; .37; .037; .03; .012; .00082 G/100ML; G/100ML; G/100ML; G/100ML; G/100ML; G/100ML; G/100ML
1000 INJECTION, SOLUTION INTRAVENOUS ONCE
Status: COMPLETED | OUTPATIENT
Start: 2025-01-07 | End: 2025-01-07

## 2025-01-07 RX ORDER — FLUDROCORTISONE ACETATE 0.1 MG/1
0.2 TABLET ORAL DAILY
Status: DISCONTINUED | OUTPATIENT
Start: 2025-01-07 | End: 2025-01-11 | Stop reason: HOSPADM

## 2025-01-07 RX ORDER — ASPIRIN 81 MG/1
81 TABLET, CHEWABLE ORAL DAILY
Status: DISCONTINUED | OUTPATIENT
Start: 2025-01-08 | End: 2025-01-11 | Stop reason: HOSPADM

## 2025-01-07 RX ORDER — ALBUTEROL SULFATE 90 UG/1
2 INHALANT RESPIRATORY (INHALATION) EVERY 6 HOURS PRN
Status: DISCONTINUED | OUTPATIENT
Start: 2025-01-07 | End: 2025-01-11 | Stop reason: HOSPADM

## 2025-01-07 RX ORDER — FERROUS SULFATE 325(65) MG
325 TABLET ORAL
Status: DISCONTINUED | OUTPATIENT
Start: 2025-01-08 | End: 2025-01-11 | Stop reason: HOSPADM

## 2025-01-07 RX ORDER — MAGNESIUM SULFATE HEPTAHYDRATE 40 MG/ML
2 INJECTION, SOLUTION INTRAVENOUS ONCE
Status: COMPLETED | OUTPATIENT
Start: 2025-01-07 | End: 2025-01-07

## 2025-01-07 RX ADMIN — ATORVASTATIN CALCIUM 10 MG: 10 TABLET, FILM COATED ORAL at 16:56

## 2025-01-07 RX ADMIN — SERTRALINE HYDROCHLORIDE 100 MG: 100 TABLET ORAL at 15:15

## 2025-01-07 RX ADMIN — SODIUM CHLORIDE, SODIUM GLUCONATE, SODIUM ACETATE, POTASSIUM CHLORIDE, MAGNESIUM CHLORIDE, SODIUM PHOSPHATE, DIBASIC, AND POTASSIUM PHOSPHATE 1000 ML: .53; .5; .37; .037; .03; .012; .00082 INJECTION, SOLUTION INTRAVENOUS at 10:16

## 2025-01-07 RX ADMIN — INSULIN LISPRO 5 UNITS: 100 INJECTION, SOLUTION INTRAVENOUS; SUBCUTANEOUS at 16:57

## 2025-01-07 RX ADMIN — INSULIN LISPRO 6 UNITS: 100 INJECTION, SOLUTION INTRAVENOUS; SUBCUTANEOUS at 16:57

## 2025-01-07 RX ADMIN — FLUDROCORTISONE ACETATE 0.2 MG: 0.1 TABLET ORAL at 15:16

## 2025-01-07 RX ADMIN — HYDROCORTISONE 30 MG: 20 TABLET ORAL at 15:15

## 2025-01-07 RX ADMIN — MAGNESIUM OXIDE TAB 400 MG (241.3 MG ELEMENTAL MG) 400 MG: 400 (241.3 MG) TAB at 18:18

## 2025-01-07 RX ADMIN — INSULIN GLARGINE 18 UNITS: 100 INJECTION, SOLUTION SUBCUTANEOUS at 21:53

## 2025-01-07 RX ADMIN — SODIUM CHLORIDE, SODIUM GLUCONATE, SODIUM ACETATE, POTASSIUM CHLORIDE, MAGNESIUM CHLORIDE, SODIUM PHOSPHATE, DIBASIC, AND POTASSIUM PHOSPHATE 1000 ML: .53; .5; .37; .037; .03; .012; .00082 INJECTION, SOLUTION INTRAVENOUS at 12:12

## 2025-01-07 RX ADMIN — MAGNESIUM OXIDE TAB 400 MG (241.3 MG ELEMENTAL MG) 400 MG: 400 (241.3 MG) TAB at 15:14

## 2025-01-07 RX ADMIN — APIXABAN 5 MG: 5 TABLET, FILM COATED ORAL at 21:53

## 2025-01-07 RX ADMIN — SODIUM CHLORIDE 75 ML/HR: 0.9 INJECTION, SOLUTION INTRAVENOUS at 15:54

## 2025-01-07 RX ADMIN — MAGNESIUM SULFATE HEPTAHYDRATE 2 G: 40 INJECTION, SOLUTION INTRAVENOUS at 11:18

## 2025-01-07 NOTE — ASSESSMENT & PLAN NOTE
Wt Readings from Last 3 Encounters:   09/05/24 77.8 kg (171 lb 9.6 oz)   06/27/24 80.5 kg (177 lb 6.4 oz)   06/13/24 88.9 kg (196 lb)     12/2023 TTE EF 60%  Maintained on Lasix 20 mg daily prn.   I/Os

## 2025-01-07 NOTE — H&P
H&P - Hospitalist   Name: Karson You 69 y.o. male I MRN: [id number]  Unit/Bed#: ED 19 I Date of Admission: 1/7/2025   Date of Service: 1/7/2025 I Hospital Day: 0     Assessment & Plan  Fall  Pmhx of adrenal insufficiency and orthostatic hypotension who presents with 2 falls today. 1 of episode while trying to go to the bathroom.  No head strike or LOC reported. Daughter reports he was briefly unresponsive and was gasping for air.   BP low in the 92/55 in the ED despite IV hydration therefore ED requests further monitoring with medicine  Suspect  presentation secondary to orthostatic hypotension.   Check orthostatics. Place on scheduled midodrine 5mg TID with holding parameters ( daughter reports this is changed to prn only ). Continue Florinef and Cortef. Stocking compression and abdominal binder. Continue IVF.  Telemetry   PT/OT  Adrenal insufficiency (HCC)  Maintained on hydrocortisone 20 mg a.m. and 15 mg p.m. along with Florinef 0.2 mg daily  Follows endocrinology outpatient  Increase dose to 40mg am/30mg pm awaiting ruling out infectious process/acute illness  Orthostatic hypotension  Known history with previously maintained on midodrine and salt tabs which have now since then been transition to as needed given outpatient concern for volume overload  Presenting with persistent hypotension in the setting of falls  Continue hydrocortisone as below  Midodrine 5 mg TID and up-titrate as needed.   Monitor orthostatic vitals  Continue compression stockings and abdominal binders when ambulating  History of CVA (cerebrovascular accident)  Continue aspirin and statin  Paroxysmal atrial fibrillation (HCC)  Rate controlled  Continue Eliquis  Not on any beta-blocker  Hypomagnesemia  Magnesium 1.7, repleted.  Monitor  Chronic diastolic heart failure (HCC)  Wt Readings from Last 3 Encounters:   09/05/24 77.8 kg (171 lb 9.6 oz)   06/27/24 80.5 kg (177 lb 6.4 oz)   06/13/24 88.9 kg (196 lb)     12/2023 TTE EF 60%  Maintained  on Lasix 20 mg daily prn.   I/Os  Type 1 diabetes mellitus with hyperglycemia (HCC)  Lab Results   Component Value Date    HGBA1C 7.0 (H) 06/13/2024       Recent Labs     01/07/25  0950   POCGLU 282*       Blood Sugar Average: Last 72 hrs:  (P) 282    Home regimen: Toujeo 18 units daily and lispro 8 units 3 times daily with meals  Continue home regimen with correctional scale  Diabetic diet  Hypoglycemia protocol  Leukocytosis  Wbc 13.27 with reported increased work of breathing and congestion.   Possibly steroid induced or leukemoid reaction to fall.   Follow up infectious workup including Bcx, UA, CXR and COVID/Flu  Check lactic acid however suspect his hypotension in setting of known hx of orthostatic hypotension.  Monitor off abx awaiting above workup.       VTE Pharmacologic Prophylaxis:   Moderate Risk (Score 3-4) - Pharmacological DVT Prophylaxis Ordered: apixaban (Eliquis).  Code Status: Prior   Discussion with family: Updated  (daughter) at bedside.    Anticipated Length of Stay: Patient will be admitted on an observation basis with an anticipated length of stay of less than 2 midnights secondary to fall.    History of Present Illness   Chief Complaint: danyelle You is a 69 y.o. male with a PMH of Pmhx of A-fib on Eliquis, adrenal insufficiency and orthostatic hypotension who presents with 2 falls today. 1 of episode while trying to go to the bathroom.  No head strike or LOC reported. Daughter reports he was briefly unresponsive and was gasping for air. BP low in the 92/55 in the ED despite IV hydration therefore ED requests further monitoring with medicine.  Noted to have mild leukocytosis.  Daughter reports he has been congested and trying to clear his throat frequently.  Daughter stated that she was diagnosed with pneumonia recently but she stayed away from it for the last 2 weeks.  Infectious workup ordered.  Admitted for further management      Review of Systems   Constitutional:   Positive for fatigue. Negative for chills and fever.   HENT:  Positive for congestion. Negative for ear pain and sore throat.    Eyes:  Negative for pain and visual disturbance.   Respiratory:  Positive for shortness of breath. Negative for cough.    Cardiovascular:  Negative for chest pain and palpitations.   Gastrointestinal:  Negative for abdominal pain and vomiting.   Genitourinary:  Negative for dysuria and hematuria.   Musculoskeletal:  Negative for arthralgias and back pain.   Skin:  Negative for color change and rash.   Neurological:  Positive for syncope and light-headedness. Negative for seizures.   All other systems reviewed and are negative.      Historical Information   Past Medical History:   Diagnosis Date    A-fib (HCC)     Adrenal insufficiency (HCC)     Atrial fibrillation (HCC)     Diabetes mellitus (HCC)     History of stroke 10/19/2022    Obesity, morbid (HCC) 11/14/2022    Shock (HCC) 12/14/2023    Stroke (HCC)     Type 2 MI (myocardial infarction) (HCC) 11/03/2022     History reviewed. No pertinent surgical history.  Social History     Tobacco Use    Smoking status: Former     Current packs/day: 0.25     Average packs/day: 0.3 packs/day for 30.0 years (7.5 ttl pk-yrs)     Types: Cigarettes    Smokeless tobacco: Never   Vaping Use    Vaping status: Never Used   Substance and Sexual Activity    Alcohol use: Not Currently     Alcohol/week: 5.0 standard drinks of alcohol     Types: 5 Cans of beer per week     Comment: Quit in august 2022    Drug use: Never    Sexual activity: Not Currently     E-Cigarette/Vaping    E-Cigarette Use Never User      E-Cigarette/Vaping Substances    Nicotine No     THC No     CBD No     Flavoring No     Other No     Unknown No      Family history non-contributory  Social History:  Marital Status: Single       Meds/Allergies   I have reviewed home medications with patient personally.  Prior to Admission medications    Medication Sig Start Date End Date Taking?  Authorizing Provider   acetaminophen (TYLENOL) 325 mg tablet Take 3 tablets (975 mg total) by mouth every 8 (eight) hours 12/21/23   Carmenza Leblanc PA-C   albuterol (2.5 mg/3 mL) 0.083 % nebulizer solution Take 3 mL (2.5 mg total) by nebulization every 6 (six) hours as needed for wheezing or shortness of breath 12/21/23   Carmenza Leblanc PA-C   albuterol (PROVENTIL HFA,VENTOLIN HFA) 90 mcg/act inhaler Inhale 2 puffs every 4 (four) hours as needed for wheezing 12/21/23   Carmenza Leblanc PA-C   apixaban (Eliquis) 5 mg Take 1 tablet (5 mg total) by mouth 2 (two) times a day 11/5/24   Júnior Reza MD   aspirin 81 mg chewable tablet Chew 81 mg daily    Historical Provider, MD   atorvastatin (LIPITOR) 10 mg tablet Take 1 tablet by mouth daily 7/12/22   Historical Provider, MD   Continuous Blood Gluc  (Dexcom G7 ) CYN Use 1 each continuous 10/23/23   Lewis Butts MD   Continuous Glucose Sensor (Dexcom G7 Sensor) Use 1 Device every 10 days 11/4/24   Lewis Butts MD   cyanocobalamin (VITAMIN B-12) 100 MCG tablet Take 1 tablet (100 mcg total) by mouth daily Do not start before November 15, 2023. 11/15/23   Lizet Herron MD   ergocalciferol (VITAMIN D2) 50,000 units Take 1 capsule (50,000 Units total) by mouth once a week 6/27/24   Lloyd Mosqueda MD   ferrous sulfate 324 (65 Fe) mg Take 1 tablet (324 mg total) by mouth daily before breakfast 6/27/24   Lloyd Mosqueda MD   fludrocortisone (FLORINEF) 0.1 mg tablet Take 2 tablets (0.2 mg total) by mouth daily 11/6/24   Lewis Butts MD   furosemide (LASIX) 20 mg tablet Take 1 tablet (20 mg total) by mouth 3 (three) times a week  Patient taking differently: Take 20 mg by mouth if needed 10/9/24   NACHO Gonzalez   hydrocortisone (CORTEF) 5 mg tablet USE 4 TABLETS IN MORNING AND 3 TABLETS DAILY AFTERNOON 8/16/24   Lewis Butts MD   insulin glargine (Toujeo Max SoloStar) 300 units/mL CONCENTRATED U-300 injection pen (2-unit dial)  Inject 18 Units under the skin daily before breakfast  Patient taking differently: Inject 18 Units under the skin daily at bedtime 9/10/24   Jay Orellana, DO   insulin lispro (HumaLOG) 100 units/mL injection pen E10.65 INJECT 8 UNITS BEFORE EACH MEAL PLUS 1 UNITS/60 ABOVE 150MG/DL MAX DOSE 40 UNITS/DAY 9/10/24   Jay Orellana, DO   insulin lispro (HumALOG/ADMELOG) 100 units/mL injection USE 5 UNITS BEFORE EACH MEAL PLUS 1 UNITS/60 ABOVE 150MG/DL MAX DOSE 40 UNITS/DAY  Patient not taking: Reported on 11/20/2024 9/6/24   Lewis Butts MD   Insulin Pen Needle (BD Pen Needle Josie 2nd Gen) 32G X 4 MM MISC FOR USE WITH INSULIN PEN. PHARMACY MAY DISPENSE BRAND COVERED BY INSURANCE. 12/1/23   Lewis Butts MD   Insulin Pen Needle (BD Pen Needle Josie 2nd Gen) 32G X 4 MM MISC For use with insulin pen 4 times daily. Pharmacy may dispense brand covered by insurance. 12/26/24   Lewis Butts MD   Magnesium Oxide 400 MG CAPS Take 1 tablet (400 mg total) by mouth 2 (two) times a day 6/13/24   NACHO Gonzalez   midodrine (PROAMATINE) 10 MG tablet One tablet TID with meals PRN .Hold for SBP under 140  Patient taking differently: 10 mg if needed One tablet TID with meals PRN .Hold for SBP under 140 6/13/24   NACHO Gonzalez   potassium chloride (Klor-Con M20) 20 mEq tablet TAKE 2 TABLETS BY MOUTH 2 TIMES A DAY. 7/7/24   NACHO Gonzalez   sertraline (ZOLOFT) 100 mg tablet Take 100 mg by mouth daily    Historical Provider, MD     Allergies   Allergen Reactions    Imipramine Other (See Comments)    Lisinopril Other (See Comments)    Paroxetine Other (See Comments)    Penicillins Hives    Rosiglitazone Other (See Comments)    Troglitazone Other (See Comments)       Objective :  Temp:  [98 °F (36.7 °C)] 98 °F (36.7 °C)  HR:  [86-90] 86  BP: ()/(51-67) 88/51  Resp:  [18-20] 18  SpO2:  [96 %-98 %] 97 %  O2 Device: None (Room air)    Physical Exam  Vitals and nursing note reviewed.    Constitutional:       General: He is not in acute distress.     Appearance: He is well-developed.   HENT:      Head: Normocephalic and atraumatic.   Eyes:      Conjunctiva/sclera: Conjunctivae normal.   Cardiovascular:      Rate and Rhythm: Normal rate and regular rhythm.      Heart sounds: No murmur heard.  Pulmonary:      Effort: Pulmonary effort is normal. No respiratory distress.      Breath sounds: Normal breath sounds.   Abdominal:      Palpations: Abdomen is soft.      Tenderness: There is no abdominal tenderness.   Musculoskeletal:         General: No swelling.      Cervical back: Neck supple.   Skin:     General: Skin is warm and dry.      Capillary Refill: Capillary refill takes less than 2 seconds.   Neurological:      Mental Status: He is alert.   Psychiatric:         Mood and Affect: Mood normal.          Lines/Drains:            Lab Results: I have reviewed the following results:  Results from last 7 days   Lab Units 01/07/25  1017   WBC Thousand/uL 13.27*   HEMOGLOBIN g/dL 12.5   HEMATOCRIT % 39.2   PLATELETS Thousands/uL 135*   SEGS PCT % 69   LYMPHO PCT % 15   MONO PCT % 14*   EOS PCT % 1     Results from last 7 days   Lab Units 01/07/25  1017   SODIUM mmol/L 136   POTASSIUM mmol/L 4.3   CHLORIDE mmol/L 105   CO2 mmol/L 22   BUN mg/dL 22   CREATININE mg/dL 1.04   ANION GAP mmol/L 9   CALCIUM mg/dL 7.8*   ALBUMIN g/dL 3.3*   TOTAL BILIRUBIN mg/dL 0.91   ALK PHOS U/L 78   ALT U/L 11   AST U/L 12*   GLUCOSE RANDOM mg/dL 266*         Results from last 7 days   Lab Units 01/07/25  0950   POC GLUCOSE mg/dl 282*     Lab Results   Component Value Date    HGBA1C 7.0 (H) 06/13/2024    HGBA1C 7.2 (H) 03/14/2024    HGBA1C 8.8 (H) 11/11/2023           Imaging Results Review: I reviewed radiology reports from this admission including: chest xray.  Other Study Results Review: EKG was reviewed.     Administrative Statements   I have spent a total time of 44 minutes in caring for this patient on the day of the  visit/encounter including Diagnostic results, Documenting in the medical record, Reviewing / ordering tests, medicine, procedures  , Obtaining or reviewing history  , and Communicating with other healthcare professionals .    ** Please Note: This note has been constructed using a voice recognition system. **

## 2025-01-07 NOTE — ASSESSMENT & PLAN NOTE
Known history with previously maintained on midodrine and salt tabs which have now since then been transition to as needed given outpatient concern for volume overload  Presenting with persistent hypotension in the setting of falls  Continue hydrocortisone as below  Midodrine 5 mg TID and up-titrate as needed.   Monitor orthostatic vitals  Continue compression stockings and abdominal binders when ambulating

## 2025-01-07 NOTE — ASSESSMENT & PLAN NOTE
Lab Results   Component Value Date    HGBA1C 7.0 (H) 06/13/2024       Recent Labs     01/07/25  0950   POCGLU 282*       Blood Sugar Average: Last 72 hrs:  (P) 282    Home regimen: Toujeo 18 units daily and lispro 8 units 3 times daily with meals  Continue home regimen with correctional scale  Diabetic diet  Hypoglycemia protocol

## 2025-01-07 NOTE — ASSESSMENT & PLAN NOTE
Pmhx of adrenal insufficiency and orthostatic hypotension who presents with 2 falls today. 1 of episode while trying to go to the bathroom.  No head strike or LOC reported. Daughter reports he was briefly unresponsive and was gasping for air.   BP low in the 92/55 in the ED despite IV hydration therefore ED requests further monitoring with medicine  Suspect  presentation secondary to orthostatic hypotension.   Check orthostatics. Place on scheduled midodrine 5mg TID with holding parameters ( daughter reports this is changed to prn only ). Continue Florinef and Cortef. Stocking compression and abdominal binder. Continue IVF.  Telemetry   PT/OT

## 2025-01-07 NOTE — ED ATTENDING ATTESTATION
1/7/2025  I, Rich Hightower MD, saw and evaluated the patient. I have discussed the patient with the resident/non-physician practitioner and agree with the resident's/non-physician practitioner's findings, Plan of Care, and MDM as documented in the resident's/non-physician practitioner's note, except where noted. All available labs and Radiology studies were reviewed.  I was present for key portions of any procedure(s) performed by the resident/non-physician practitioner and I was immediately available to provide assistance.       At this point I agree with the current assessment done in the Emergency Department.  I have conducted an independent evaluation of this patient a history and physical is as follows:    ED Course     69-year-old male, history of multiple comorbid illnesses, including paroxysmal atrial fibrillation on anticoagulation, adrenal insufficiency, diabetes, stroke, presenting to the emergency department for evaluation of a fall event.  Patient has a history of orthostatic hypotension, had gotten up from a seated position to a standing position, got lightheaded, and generally weak and fell backwards landing on his buttocks.  Patient did not hit his head.  Negative LOC.  Family then attempted to get the patient back up to a standing position and he had an episode where his eyes were open, he was not voice responsive, and his respirations were labored and irregular.  This prompted EMS activation.    History is obtained from the patient as well as his daughter who is present at the bedside.    Past Medical History:   Diagnosis Date    A-fib (HCC)     Adrenal insufficiency (HCC)     Atrial fibrillation (HCC)     Diabetes mellitus (HCC)     History of stroke 10/19/2022    Obesity, morbid ([facility]) 11/14/2022    Shock ([facility]) 12/14/2023    Stroke ([facility])     Type 2 MI (myocardial infarction) ([facility]) 11/03/2022     Patient is noted to be mildly tachypneic.  Patient is intermittently coughing.  The patient is  resting comfortably on a stretcher in no acute respiratory distress. The patient appears nontoxic. HEENT reveals moist mucous membranes. Head is normocephalic and atraumatic. Conjunctiva and sclera are normal. Neck is nontender and supple with full range of motion to flexion, extension, lateral rotation. No meningismus appreciated. No masses are appreciated. Lungs are clear to auscultation bilaterally without any wheezes, rales or rhonchi. Heart is regular rate and rhythm without any murmurs, rubs or gallops. Abdomen is soft and nontender without any rebound or guarding. Extremities appear grossly normal without any significant arthropathy. Patient is awake, alert, and oriented x3. The patient has normal interaction.  Cranial nerves grossly intact.  Motor is 5 out of 5 bilateral upper and lower extremities.    MEDICAL DECISION MAKING    Number and Complexity of Problems  Differential diagnosis: Complications of diabetes including diabetic ketoacidosis, pneumonia, electrolyte disturbance, orthostatic hypotension, adrenal insufficiency.    Medical Decision Making Data  External documents reviewed: Reviewed last telemedicine visit with endocrinology on 11/20/2024.  My EKG interpretation: Atrial fibrillation at 87 bpm.  No acute ST or T wave changes.  My X-ray interpretation: No acute cardiopulmonary disease.    XR chest 2 views   ED Interpretation   No acute cardiopulmonary disease.      Final Result      No acute cardiopulmonary disease.      No acute displaced fractures.      Workstation performed: [id number]             Labs Reviewed   CBC AND DIFFERENTIAL - Abnormal       Result Value Ref Range Status    WBC 13.27 (*) 4.31 - 10.16 Thousand/uL Final    RBC 3.90  3.88 - 5.62 Million/uL Final    Hemoglobin 12.5  12.0 - 17.0 g/dL Final    Hematocrit 39.2  36.5 - 49.3 % Final     (*) 82 - 98 fL Final    MCH 32.1  26.8 - 34.3 pg Final    MCHC 31.9  31.4 - 37.4 g/dL Final    RDW 14.4  11.6 - 15.1 % Final    MPV 11.3   8.9 - 12.7 fL Final    Platelets 135 (*) 149 - 390 Thousands/uL Final    nRBC 0  /100 WBCs Final    Segmented % 69  43 - 75 % Final    Immature Grans % 1  0 - 2 % Final    Lymphocytes % 15  14 - 44 % Final    Monocytes % 14 (*) 4 - 12 % Final    Eosinophils Relative 1  0 - 6 % Final    Basophils Relative 0  0 - 1 % Final    Absolute Neutrophils 9.17 (*) 1.85 - 7.62 Thousands/µL Final    Absolute Immature Grans 0.09  0.00 - 0.20 Thousand/uL Final    Absolute Lymphocytes 1.95  0.60 - 4.47 Thousands/µL Final    Absolute Monocytes 1.90 (*) 0.17 - 1.22 Thousand/µL Final    Eosinophils Absolute 0.13  0.00 - 0.61 Thousand/µL Final    Basophils Absolute 0.03  0.00 - 0.10 Thousands/µL Final   COMPREHENSIVE METABOLIC PANEL - Abnormal    Sodium 136  135 - 147 mmol/L Final    Potassium 4.3  3.5 - 5.3 mmol/L Final    Chloride 105  96 - 108 mmol/L Final    CO2 22  21 - 32 mmol/L Final    ANION GAP 9  4 - 13 mmol/L Final    BUN 22  5 - 25 mg/dL Final    Creatinine 1.04  0.60 - 1.30 mg/dL Final    Comment: Standardized to IDMS reference method    Glucose 266 (*) 65 - 140 mg/dL Final    Comment: If the patient is fasting, the ADA then defines impaired fasting glucose as > 100 mg/dL and diabetes as > or equal to 123 mg/dL.    Calcium 7.8 (*) 8.4 - 10.2 mg/dL Final    Corrected Calcium 8.4  8.3 - 10.1 mg/dL Final    AST 12 (*) 13 - 39 U/L Final    ALT 11  7 - 52 U/L Final    Comment: Specimen collection should occur prior to Sulfasalazine administration due to the potential for falsely depressed results.     Alkaline Phosphatase 78  34 - 104 U/L Final    Total Protein 5.8 (*) 6.4 - 8.4 g/dL Final    Albumin 3.3 (*) 3.5 - 5.0 g/dL Final    Total Bilirubin 0.91  0.20 - 1.00 mg/dL Final    Comment: Use of this assay is not recommended for patients undergoing treatment with eltrombopag due to the potential for falsely elevated results.  N-acetyl-p-benzoquinone imine (metabolite of Acetaminophen) will generate erroneously low results in  samples for patients that have taken an overdose of Acetaminophen.    eGFR 73  ml/min/1.73sq m Final    Narrative:     National Kid[employer] guidelines for Chronic Kidney Disease (CKD):     Stage 1 with normal or high GFR (GFR > 90 mL/min/1.73 square meters)    Stage 2 Mild CKD (GFR = 60-89 mL/min/1.73 square meters)    Stage 3A Moderate CKD (GFR = 45-59 mL/min/1.73 square meters)    Stage 3B Moderate CKD (GFR = 30-44 mL/min/1.73 square meters)    Stage 4 Severe CKD (GFR = 15-29 mL/min/1.73 square meters)    Stage 5 End Stage CKD (GFR <15 mL/min/1.73 square meters)  Note: GFR calculation is accurate only with a steady state creatinine   MAGNESIUM - Abnormal    Magnesium 1.7 (*) 1.9 - 2.7 mg/dL Final   UA W REFLEX TO MICROSCOPIC WITH REFLEX TO CULTURE - Abnormal    Color, UA Light Yellow   Final    Clarity, UA Clear   Final    Specific Gravity, UA 1.014  1.003 - 1.030 Final    pH, UA 5.5  4.5, 5.0, 5.5, 6.0, 6.5, 7.0, 7.5, 8.0 Final    Leukocytes, UA Negative  Negative Final    Nitrite, UA Negative  Negative Final    Protein, UA Negative  Negative mg/dl Final    Glucose,  (1/2%) (*) Negative mg/dl Final    Ketones, UA Trace (*) Negative mg/dl Final    Urobilinogen, UA <2.0  <2.0 mg/dl mg/dl Final    Bilirubin, UA Negative  Negative Final    Occult Blood, UA Negative  Negative Final   LACTIC ACID, PLASMA (W/REFLEX IF RESULT > 2.0) - Abnormal    LACTIC ACID 2.3 (*) 0.5 - 2.0 mmol/L Final    Narrative:     Result may be elevated if tourniquet was used during collection.   URINE MICROSCOPIC - Abnormal    RBC, UA 1-2  None Seen, 1-2 /hpf Final    WBC, UA None Seen  None Seen, 1-2 /hpf Final    Epithelial Cells None Seen  None Seen, Occasional /hpf Final    Bacteria, UA None Seen  None Seen, Occasional /hpf Final    Hyaline Casts, UA 3-5 (*) None Seen /lpf Final   POCT GLUCOSE - Abnormal    POC Glucose 282 (*) 65 - 140 mg/dl Final   COVID[facility], [facility] - Normal    SARS-CoV-2 Negative   Negative Final    Comment:      INFLUENZA A PCR Negative  Negative Final    Comment:      INFLUENZA B PCR Negative  Negative Final    Comment:      RSV PCR Negative  Negative Final    Comment:      Narrative:     This test has been performed using the CoV-2/Flu/RSV plus assay on the [employer] platform. This test has been validated by the  and verified by the performing laboratory.     This test is designed to amplify and detect the following: nucleocapsid (N), envelope (E), and RNA-dependent RNA polymerase (RdRP) genes of the SARS-CoV-2 genome; matrix (M), basic polymerase (PB2), and acidic protein (PA) segments of the influenza A genome; matrix (M) and non-structural protein (NS) segments of the influenza B genome, and the nucleocapsid genes of RSV A and RSV B.     Positive results are indicative of the presence of Flu A, Flu B, RSV, and/or SARS-CoV-2 RNA. Positive results for SARS-CoV-2 or suspected novel influenza should be reported to state, local, or federal health departments according to local reporting requirements.      All results should be assessed in conjunction with clinical presentation and other laboratory markers for clinical management.     FOR PEDIATRIC PATIENTS - copy/paste COVID Guidelines URL to browser: [email or web address]      BLOOD CULTURE   BLOOD CULTURE   LACTIC ACID 2 HOUR       Labs reviewed by me are significant for:  Elevated white count of 13.  Elevated blood glucose of 266.    Discussed case with: Medicine.  Considered admission for: Orthostatic hypotension with concern for possible adrenal crisis.    Treatment and Disposition  ED course: Patient was given IV fluid, 2 L, in the emergency department for lowish blood pressures.  Patient continued to run systolics in the 90s prompting decision for admission for observation.  Patient noted to be in atrial fibrillation, but this was not thought to be the underlying  cause of the hypotension as he was rate controlled.  Shared decision making: Patient agreeable to admission.  Code status: Full code.                Critical Care Time  Procedures

## 2025-01-07 NOTE — ASSESSMENT & PLAN NOTE
Maintained on hydrocortisone 20 mg a.m. and 15 mg p.m. along with Florinef 0.2 mg daily  Follows endocrinology outpatient  Increase dose to 40mg am/30mg pm awaiting ruling out infectious process/acute illness

## 2025-01-07 NOTE — ED NOTES
Attempted orthostatics at this time. Patient tolerated laying and sitting positions. When this RN and another attempted standing, patient did not tolerate well. Patient became unresponsive and dizzy. Got the patient back into bed. Patient became more lively. Provider made aware at this time.      Whitney Shankar RN  01/07/25 [id number]

## 2025-01-07 NOTE — ASSESSMENT & PLAN NOTE
Wbc 13.27 with reported increased work of breathing and congestion.   Possibly steroid induced or leukemoid reaction to fall.   Follow up infectious workup including Bcx, UA, CXR and COVID/Flu  Check lactic acid however suspect his hypotension in setting of known hx of orthostatic hypotension.  Monitor off abx awaiting above workup.

## 2025-01-07 NOTE — ED PROVIDER NOTES
Time reflects when diagnosis was documented in both MDM as applicable and the Disposition within this note       Time User Action Codes Description Comment    1/7/2025 12:57 PM Grover Tierney Add [R55] Syncope           ED Disposition       ED Disposition   Admit    Condition   Stable    Date/Time   Tue Jan 7, 2025 12:57 PM    Comment   Case was discussed with slim             Assessment & Plan       Medical Decision Making  Amount and/or Complexity of Data Reviewed  Labs: ordered.  Radiology: ordered and independent interpretation performed.    Risk  Prescription drug management.  Decision regarding hospitalization.        ED Course as of 01/07/25 1325   Tue Jan 07, 2025   1140 EKG; irregularly irregular rhythm. Normal rate. Normal interval. Afib       Medications   magnesium Oxide (MAG-OX) tablet 400 mg (has no administration in time range)   multi-electrolyte (ISOLYTE-S PH 7.4) bolus 1,000 mL (0 mL Intravenous Stopped 1/7/25 1115)   magnesium sulfate 2 g/50 mL IVPB (premix) 2 g (0 g Intravenous Stopped 1/7/25 1310)   multi-electrolyte (ISOLYTE-S PH 7.4) bolus 1,000 mL (1,000 mL Intravenous New Bag 1/7/25 1212)       ED Risk Strat Scores        69-year-old male with past medical history for A-fib diabetes stroke and adrenal insufficiency who comes in for 2 witnessed falls.  The patient's first fall was witnessed by his daughter who states that he fell onto his butt without any head strike or loss of consciousness.  Patient states that the second floor was significant for him being more confused following.  Patient does have a history of orthostatic hypotension.  He states that he has been following up with his daily glucose checks and taking his medication appropriately.  He has not had any recent trauma or infections    On examination, the patient's pupils were 1+ and reactive extraocular motions intact cranial nerves II through XII normal no peripheral motor or sensory deficits.  Patient is not confused at this  time.  Heart and lungs clear to auscultation abdomen soft and nontender    Differential diagnose include electrolyte abnormalities, arrhythmia, glucose irregularity, adrenal insufficiency    Plan to get CBC CMP EKG chest x-ray and reevaluate    The patient became slightly hypotensive with a systolic blood pressure of 92/55.  He was given IV boluses without any significant improvement.    The patient was admitted to the internal medicine team given his symptomatic illness at this time.  Possible that the patient is having adrenal abnormality requiring steroids to help with his blood pressure.                  SBIRT 20yo+      Flowsheet Row Most Recent Value   Initial Alcohol Screen: US AUDIT-C     1. How often do you have a drink containing alcohol? 0 Filed at: 01/07/2025 1022   2. How many drinks containing alcohol do you have on a typical day you are drinking?  0 Filed at: 01/07/2025 1022   3b. FEMALE Any Age, or MALE 65+: How often do you have 4 or more drinks on one occassion? 0 Filed at: 01/07/2025 1022   Audit-C Score 0 Filed at: 01/07/2025 1022   SUSANNE: How many times in the past year have you...    Used an illegal drug or used a prescription medication for non-medical reasons? Never Filed at: 01/07/2025 1022                            History of Present Illness       Chief Complaint   Patient presents with    Syncope     Coming from home. C/o weak legs which led him to fall on his buttocks. Feeling dizzy and off when he fell. Unknown loc. -hs. Hx orthostatic hypotension.        Past Medical History:   Diagnosis Date    A-fib (HCC)     Adrenal insufficiency (HCC)     Atrial fibrillation (HCC)     Diabetes mellitus (HCC)     History of stroke 10/19/2022    Obesity, morbid (HCC) 11/14/2022    Shock (HCC) 12/14/2023    Stroke (HCC)     Type 2 MI (myocardial infarction) ([facility]) 11/03/2022      History reviewed. No pertinent surgical history.   Family History   Problem Relation Age of Onset    Heart disease Mother      Cancer Father     Multiple sclerosis Sister       Social History     Tobacco Use    Smoking status: Former     Current packs/day: 0.25     Average packs/day: 0.3 packs/day for 30.0 years (7.5 ttl pk-yrs)     Types: Cigarettes    Smokeless tobacco: Never   Vaping Use    Vaping status: Never Used   Substance Use Topics    Alcohol use: Not Currently     Alcohol/week: 5.0 standard drinks of alcohol     Types: 5 Cans of beer per week     Comment: Quit in august 2022    Drug use: Never      E-Cigarette/Vaping    E-Cigarette Use Never User       E-Cigarette/Vaping Substances    Nicotine No     THC No     CBD No     Flavoring No     Other No     Unknown No       I have reviewed and agree with the history as documented.     69-year-old male with numerous comorbidities comes in after 2 witnessed falls without head strike or loss of consciousness        Review of Systems   Constitutional:  Negative for chills and fever.   HENT:  Negative for ear pain and sore throat.    Eyes:  Negative for pain and visual disturbance.   Respiratory:  Negative for cough and shortness of breath.    Cardiovascular:  Negative for chest pain and palpitations.   Gastrointestinal:  Negative for abdominal pain and vomiting.   Genitourinary:  Negative for dysuria and hematuria.   Musculoskeletal:  Negative for arthralgias and back pain.   Skin:  Negative for color change and rash.   Neurological:  Positive for weakness. Negative for tremors, seizures and syncope.   All other systems reviewed and are negative.          Objective       ED Triage Vitals   Temperature Pulse Blood Pressure Respirations SpO2 Patient Position - Orthostatic VS   01/07/25 1019 01/07/25 0954 01/07/25 0954 01/07/25 0954 01/07/25 0954 01/07/25 1235   98 °F (36.7 °C) 90 124/62 20 96 % Lying      Temp Source Heart Rate Source BP Location FiO2 (%) Pain Score    01/07/25 1019 01/07/25 0954 01/07/25 1235 -- 01/07/25 1205    Oral Monitor Left arm  No Pain      Vitals      Date and Time  Temp Pulse SpO2 Resp BP Pain Score FACES Pain Rating User   01/07/25 1235 -- 86 97 % 18 101/56 -- -- SM   01/07/25 1205 -- 87 98 % 20  92/55 No Pain -- BG   01/07/25 1116 -- 88 98 % 20 147/67 -- -- BG   01/07/25 1019 98 °F (36.7 °C) -- -- -- -- -- -- KM   01/07/25 0954 -- 90 96 % 20 124/62 -- -- KM            Physical Exam  Vitals and nursing note reviewed.   Constitutional:       General: He is not in acute distress.     Appearance: He is well-developed.   HENT:      Head: Normocephalic and atraumatic.   Eyes:      Extraocular Movements: Extraocular movements intact.      Conjunctiva/sclera: Conjunctivae normal.      Pupils: Pupils are equal, round, and reactive to light.   Cardiovascular:      Rate and Rhythm: Normal rate and regular rhythm.      Heart sounds: No murmur heard.  Pulmonary:      Effort: Pulmonary effort is normal. No respiratory distress.      Breath sounds: Normal breath sounds.   Abdominal:      Palpations: Abdomen is soft.      Tenderness: There is no abdominal tenderness.   Musculoskeletal:         General: No swelling.      Cervical back: Neck supple.   Skin:     General: Skin is warm and dry.      Capillary Refill: Capillary refill takes less than 2 seconds.   Neurological:      Mental Status: He is alert and oriented to person, place, and time. Mental status is at baseline.      Cranial Nerves: No cranial nerve deficit.      Sensory: No sensory deficit.      Motor: No weakness.      Coordination: Coordination normal.      Gait: Gait normal.   Psychiatric:         Mood and Affect: Mood normal.         Results Reviewed       Procedure Component Value Units Date/Time    UA w Reflex to Microscopic w Reflex to Culture [[id number]]     Lab Status: No result Specimen: Urine     Blood culture [[id number]]     Lab Status: No result Specimen: Blood     Blood culture [[id number]]     Lab Status: No result Specimen: Blood     Lactic acid, plasma (w/reflex if result > 2.0) [[id number]]     Lab Status: No  result Specimen: Blood     COVID/FLU/RSV [[id number]]     Lab Status: No result Specimen: Nares from Nose     Comprehensive metabolic panel [[id number]]  (Abnormal) Collected: 01/07/25 1017    Lab Status: Final result Specimen: Blood from Arm, Left Updated: 01/07/25 1049     Sodium 136 mmol/L      Potassium 4.3 mmol/L      Chloride 105 mmol/L      CO2 22 mmol/L      ANION GAP 9 mmol/L      BUN 22 mg/dL      Creatinine 1.04 mg/dL      Glucose 266 mg/dL      Calcium 7.8 mg/dL      Corrected Calcium 8.4 mg/dL      AST 12 U/L      ALT 11 U/L      Alkaline Phosphatase 78 U/L      Total Protein 5.8 g/dL      Albumin 3.3 g/dL      Total Bilirubin 0.91 mg/dL      eGFR 73 ml/min/1.73sq m     Narrative:      National Kidney Disease Foundation guidelines for Chronic Kidney Disease (CKD):     Stage 1 with normal or high GFR (GFR > 90 mL/min/1.73 square meters)    Stage 2 Mild CKD (GFR = 60-89 mL/min/1.73 square meters)    Stage 3A Moderate CKD (GFR = 45-59 mL/min/1.73 square meters)    Stage 3B Moderate CKD (GFR = 30-44 mL/min/1.73 square meters)    Stage 4 Severe CKD (GFR = 15-29 mL/min/1.73 square meters)    Stage 5 End Stage CKD (GFR <15 mL/min/1.73 square meters)  Note: GFR calculation is accurate only with a steady state creatinine    Magnesium [[id number]]  (Abnormal) Collected: 01/07/25 1017    Lab Status: Final result Specimen: Blood from Arm, Left Updated: 01/07/25 1049     Magnesium 1.7 mg/dL     CBC and differential [[id number]]  (Abnormal) Collected: 01/07/25 1017    Lab Status: Final result Specimen: Blood from Arm, Left Updated: 01/07/25 1024     WBC 13.27 Thousand/uL      RBC 3.90 Million/uL      Hemoglobin 12.5 g/dL      Hematocrit 39.2 %       fL      MCH 32.1 pg      MCHC 31.9 g/dL      RDW 14.4 %      MPV 11.3 fL      Platelets 135 Thousands/uL      nRBC 0 /100 WBCs      Segmented % 69 %      Immature Grans % 1 %      Lymphocytes % 15 %      Monocytes % 14 %      Eosinophils Relative 1 %      Basophils  Relative 0 %      Absolute Neutrophils 9.17 Thousands/µL      Absolute Immature Grans 0.09 Thousand/uL      Absolute Lymphocytes 1.95 Thousands/µL      Absolute Monocytes 1.90 Thousand/µL      Eosinophils Absolute 0.13 Thousand/µL      Basophils Absolute 0.03 Thousands/µL     Fingerstick Glucose (POCT) [[id number]]  (Abnormal) Collected: 01/07/25 0950    Lab Status: Final result Specimen: Blood Updated: 01/07/25 0951     POC Glucose 282 mg/dl             XR chest 2 views   ED Interpretation by Rich Hightower MD (01/07 1218)   No acute cardiopulmonary disease.      Final Interpretation by Hillary Amanda MD (01/07 1242)      No acute cardiopulmonary disease.      No acute displaced fractures.      Workstation performed: [id number]             Procedures    ED Medication and Procedure Management   Prior to Admission Medications   Prescriptions Last Dose Informant Patient Reported? Taking?   Continuous Blood Gluc  (Dexcom G7 ) CYN  Child No No   Sig: Use 1 each continuous   Continuous Glucose Sensor (Dexcom G7 Sensor)   No No   Sig: Use 1 Device every 10 days   Insulin Pen Needle (BD Pen Needle Josie 2nd Gen) 32G X 4 MM MISC  Child No No   Sig: FOR USE WITH INSULIN PEN. PHARMACY MAY DISPENSE BRAND COVERED BY INSURANCE.   Insulin Pen Needle (BD Pen Needle Josie 2nd Gen) 32G X 4 MM MISC   No No   Sig: For use with insulin pen 4 times daily. Pharmacy may dispense brand covered by insurance.   Magnesium Oxide 400 MG CAPS  Child No No   Sig: Take 1 tablet (400 mg total) by mouth 2 (two) times a day   acetaminophen (TYLENOL) 325 mg tablet  Child No No   Sig: Take 3 tablets (975 mg total) by mouth every 8 (eight) hours   albuterol (2.5 mg/3 mL) 0.083 % nebulizer solution  Child No No   Sig: Take 3 mL (2.5 mg total) by nebulization every 6 (six) hours as needed for wheezing or shortness of breath   albuterol (PROVENTIL HFA,VENTOLIN HFA) 90 mcg/act inhaler  Child No No   Sig: Inhale 2 puffs every 4 (four)  hours as needed for wheezing   apixaban (Eliquis) 5 mg   No No   Sig: Take 1 tablet (5 mg total) by mouth 2 (two) times a day   aspirin 81 mg chewable tablet  Child Yes No   Sig: Chew 81 mg daily   atorvastatin (LIPITOR) 10 mg tablet  Child Yes No   Sig: Take 1 tablet by mouth daily   cyanocobalamin (VITAMIN B-12) 100 MCG tablet  Child No No   Sig: Take 1 tablet (100 mcg total) by mouth daily Do not start before November 15, 2023.   ergocalciferol (VITAMIN D2) 50,000 units  Child No No   Sig: Take 1 capsule (50,000 Units total) by mouth once a week   ferrous sulfate 324 (65 Fe) mg  Child No No   Sig: Take 1 tablet (324 mg total) by mouth daily before breakfast   fludrocortisone (FLORINEF) 0.1 mg tablet   No No   Sig: Take 2 tablets (0.2 mg total) by mouth daily   furosemide (LASIX) 20 mg tablet   No No   Sig: Take 1 tablet (20 mg total) by mouth 3 (three) times a week   Patient taking differently: Take 20 mg by mouth if needed   hydrocortisone (CORTEF) 5 mg tablet  Child No No   Sig: USE 4 TABLETS IN MORNING AND 3 TABLETS DAILY AFTERNOON   insulin glargine (Toujeo Max SoloStar) 300 units/mL CONCENTRATED U-300 injection pen (2-unit dial)   No No   Sig: Inject 18 Units under the skin daily before breakfast   Patient taking differently: Inject 18 Units under the skin daily at bedtime   insulin lispro (HumALOG/ADMELOG) 100 units/mL injection   No No   Sig: USE 5 UNITS BEFORE EACH MEAL PLUS 1 UNITS/60 ABOVE 150MG/DL MAX DOSE 40 UNITS/DAY   Patient not taking: Reported on 11/20/2024   insulin lispro (HumaLOG) 100 units/mL injection pen   No No   Sig: E10.65 INJECT 8 UNITS BEFORE EACH MEAL PLUS 1 UNITS/60 ABOVE 150MG/DL MAX DOSE 40 UNITS/DAY   midodrine (PROAMATINE) 10 MG tablet  Child No No   Sig: One tablet TID with meals PRN .Hold for SBP under 140   Patient taking differently: 10 mg if needed One tablet TID with meals PRN .Hold for SBP under 140   potassium chloride (Klor-Con M20) 20 mEq tablet  Child No No   Sig: TAKE  2 TABLETS BY MOUTH 2 TIMES A DAY.   sertraline (ZOLOFT) 100 mg tablet   Yes No   Sig: Take 100 mg by mouth daily      Facility-Administered Medications: None     Patient's Medications   Discharge Prescriptions    No medications on file     No discharge procedures on file.  ED SEPSIS DOCUMENTATION   Time reflects when diagnosis was documented in both MDM as applicable and the Disposition within this note       Time User Action Codes Description Comment    1/7/2025 12:57 PM Grover Tierney Add [R55] Syncope                  Grover Tierney MD  01/07/25 5979

## 2025-01-07 NOTE — RESPIRATORY THERAPY NOTE
RT Protocol Note  Karson You 69 y.o. male MRN: [id number]  Unit/Bed#: ED 19 Encounter: [id number]    Assessment    Principal Problem:    Fall  Active Problems:    Adrenal insufficiency (HCC)    Orthostatic hypotension    History of CVA (cerebrovascular accident)    Paroxysmal atrial fibrillation (HCC)    Hypomagnesemia    Chronic diastolic heart failure (HCC)    Type 1 diabetes mellitus with hyperglycemia (HCC)    Leukocytosis      Home Pulmonary Medications:  Albuterol mdi       Past Medical History:   Diagnosis Date    A-fib (HCC)     Adrenal insufficiency (HCC)     Atrial fibrillation (HCC)     Diabetes mellitus (HCC)     History of stroke 10/19/2022    Obesity, morbid (HCC) 11/14/2022    Shock (HCC) 12/14/2023    Stroke (HCC)     Type 2 MI (myocardial infarction) (HCC) 11/03/2022     Social History     Socioeconomic History    Marital status: Single     Spouse name: None    Number of children: None    Years of education: None    Highest education level: None   Occupational History    None   Tobacco Use    Smoking status: Former     Current packs/day: 0.25     Average packs/day: 0.3 packs/day for 30.0 years (7.5 ttl pk-yrs)     Types: Cigarettes    Smokeless tobacco: Never   Vaping Use    Vaping status: Never Used   Substance and Sexual Activity    Alcohol use: Not Currently     Alcohol/week: 5.0 standard drinks of alcohol     Types: 5 Cans of beer per week     Comment: Quit in august 2022    Drug use: Never    Sexual activity: Not Currently   Other Topics Concern    None   Social History Narrative    ** Merged History Encounter **          Social Drivers of Health     Financial Resource Strain: Not on file   Food Insecurity: No Food Insecurity (9/5/2024)    Nursing - Inadequate Food Risk Classification     Worried About Running Out of Food in the Last Year: Never true     Ran Out of Food in the Last Year: Never true     Ran Out of Food in the Last Year: Not on file   Transportation Needs: No Transportation  Needs (9/5/2024)    PRAPARE - Transportation     Lack of Transportation (Medical): No     Lack of Transportation (Non-Medical): No   Physical Activity: Not on file   Stress: Not on file   Social Connections: Not on file   Intimate Partner Violence: Not on file   Housing Stability: High Risk (9/5/2024)    Housing Stability Vital Sign     Unable to Pay for Housing in the Last Year: No     Number of Times Moved in the Last Year: 2     Homeless in the Last Year: No       Subjective         Objective    Physical Exam:   Assessment Type: Assess only  General Appearance: Alert, Awake  Respiratory Pattern: Normal  Chest Assessment: Chest expansion symmetrical  Bilateral Breath Sounds: Clear  Cough: None  O2 Device: room air    Vitals:  Blood pressure 125/60, pulse 82, temperature 98 °F (36.7 °C), temperature source Oral, resp. rate 18, SpO2 97%.          Imaging and other studies: Results Review Statement: I reviewed radiology reports from this admission including: chest xray.    O2 Device: room air     Plan    Respiratory Plan: Discontinue Protocol        Resp Comments: pt assesed for rcp.pt doesnt have any pulmonary hx and take albuterol mdi for sob. will d/c rcp at this time.

## 2025-01-08 LAB
ANION GAP SERPL CALCULATED.3IONS-SCNC: 10 MMOL/L (ref 4–13)
BUN SERPL-MCNC: 25 MG/DL (ref 5–25)
CALCIUM SERPL-MCNC: 7.7 MG/DL (ref 8.4–10.2)
CHLORIDE SERPL-SCNC: 104 MMOL/L (ref 96–108)
CO2 SERPL-SCNC: 23 MMOL/L (ref 21–32)
CREAT SERPL-MCNC: 1.02 MG/DL (ref 0.6–1.3)
ERYTHROCYTE [DISTWIDTH] IN BLOOD BY AUTOMATED COUNT: 14.4 % (ref 11.6–15.1)
GFR SERPL CREATININE-BSD FRML MDRD: 75 ML/MIN/1.73SQ M
GLUCOSE SERPL-MCNC: 157 MG/DL (ref 65–140)
GLUCOSE SERPL-MCNC: 171 MG/DL (ref 65–140)
GLUCOSE SERPL-MCNC: 177 MG/DL (ref 65–140)
GLUCOSE SERPL-MCNC: 193 MG/DL (ref 65–140)
GLUCOSE SERPL-MCNC: 234 MG/DL (ref 65–140)
HCT VFR BLD AUTO: 37.2 % (ref 36.5–49.3)
HGB BLD-MCNC: 12.1 G/DL (ref 12–17)
MCH RBC QN AUTO: 32.3 PG (ref 26.8–34.3)
MCHC RBC AUTO-ENTMCNC: 32.5 G/DL (ref 31.4–37.4)
MCV RBC AUTO: 99 FL (ref 82–98)
PLATELET # BLD AUTO: 138 THOUSANDS/UL (ref 149–390)
PMV BLD AUTO: 10.7 FL (ref 8.9–12.7)
POTASSIUM SERPL-SCNC: 3.9 MMOL/L (ref 3.5–5.3)
RBC # BLD AUTO: 3.75 MILLION/UL (ref 3.88–5.62)
SODIUM SERPL-SCNC: 137 MMOL/L (ref 135–147)
WBC # BLD AUTO: 12.29 THOUSAND/UL (ref 4.31–10.16)

## 2025-01-08 PROCEDURE — 97167 OT EVAL HIGH COMPLEX 60 MIN: CPT

## 2025-01-08 PROCEDURE — 80048 BASIC METABOLIC PNL TOTAL CA: CPT | Performed by: INTERNAL MEDICINE

## 2025-01-08 PROCEDURE — 82948 REAGENT STRIP/BLOOD GLUCOSE: CPT

## 2025-01-08 PROCEDURE — 99232 SBSQ HOSP IP/OBS MODERATE 35: CPT | Performed by: PHYSICIAN ASSISTANT

## 2025-01-08 PROCEDURE — 85027 COMPLETE CBC AUTOMATED: CPT | Performed by: INTERNAL MEDICINE

## 2025-01-08 PROCEDURE — 97163 PT EVAL HIGH COMPLEX 45 MIN: CPT

## 2025-01-08 RX ORDER — HYDROCORTISONE 20 MG/1
20 TABLET ORAL EVERY MORNING
Status: DISCONTINUED | OUTPATIENT
Start: 2025-01-09 | End: 2025-01-11 | Stop reason: HOSPADM

## 2025-01-08 RX ADMIN — SERTRALINE HYDROCHLORIDE 100 MG: 100 TABLET ORAL at 08:31

## 2025-01-08 RX ADMIN — INSULIN LISPRO 8 UNITS: 100 INJECTION, SOLUTION INTRAVENOUS; SUBCUTANEOUS at 17:04

## 2025-01-08 RX ADMIN — VITAM B12 100 MCG: 100 TAB at 08:31

## 2025-01-08 RX ADMIN — ASPIRIN 81 MG CHEWABLE TABLET 81 MG: 81 TABLET CHEWABLE at 08:31

## 2025-01-08 RX ADMIN — INSULIN GLARGINE 18 UNITS: 100 INJECTION, SOLUTION SUBCUTANEOUS at 21:02

## 2025-01-08 RX ADMIN — INSULIN LISPRO 1 UNITS: 100 INJECTION, SOLUTION INTRAVENOUS; SUBCUTANEOUS at 12:16

## 2025-01-08 RX ADMIN — INSULIN LISPRO 2 UNITS: 100 INJECTION, SOLUTION INTRAVENOUS; SUBCUTANEOUS at 17:03

## 2025-01-08 RX ADMIN — APIXABAN 5 MG: 5 TABLET, FILM COATED ORAL at 17:03

## 2025-01-08 RX ADMIN — MAGNESIUM OXIDE TAB 400 MG (241.3 MG ELEMENTAL MG) 400 MG: 400 (241.3 MG) TAB at 08:31

## 2025-01-08 RX ADMIN — HYDROCORTISONE 40 MG: 20 TABLET ORAL at 08:36

## 2025-01-08 RX ADMIN — FERROUS SULFATE TAB 325 MG (65 MG ELEMENTAL FE) 325 MG: 325 (65 FE) TAB at 08:31

## 2025-01-08 RX ADMIN — MAGNESIUM OXIDE TAB 400 MG (241.3 MG ELEMENTAL MG) 400 MG: 400 (241.3 MG) TAB at 17:03

## 2025-01-08 RX ADMIN — HYDROCORTISONE 15 MG: 10 TABLET ORAL at 15:58

## 2025-01-08 RX ADMIN — INSULIN LISPRO 3 UNITS: 100 INJECTION, SOLUTION INTRAVENOUS; SUBCUTANEOUS at 21:01

## 2025-01-08 RX ADMIN — ATORVASTATIN CALCIUM 10 MG: 10 TABLET, FILM COATED ORAL at 15:58

## 2025-01-08 RX ADMIN — INSULIN LISPRO 8 UNITS: 100 INJECTION, SOLUTION INTRAVENOUS; SUBCUTANEOUS at 12:16

## 2025-01-08 RX ADMIN — APIXABAN 5 MG: 5 TABLET, FILM COATED ORAL at 08:31

## 2025-01-08 RX ADMIN — INSULIN LISPRO 8 UNITS: 100 INJECTION, SOLUTION INTRAVENOUS; SUBCUTANEOUS at 08:37

## 2025-01-08 RX ADMIN — FLUDROCORTISONE ACETATE 0.2 MG: 0.1 TABLET ORAL at 08:31

## 2025-01-08 RX ADMIN — INSULIN LISPRO 1 UNITS: 100 INJECTION, SOLUTION INTRAVENOUS; SUBCUTANEOUS at 08:36

## 2025-01-08 NOTE — ASSESSMENT & PLAN NOTE
Wbc 13.27 with reported increased work of breathing and congestion. Denies respiratory symptoms 1/8  Possibly steroid induced or leukemoid reaction to fall.   Follow up infectious workup including Bcx - pending, UA - negative, CXR - clear and COVID/Flu - negative  Monitor off abx

## 2025-01-08 NOTE — ASSESSMENT & PLAN NOTE
Maintained on hydrocortisone 20 mg a.m. and 15 mg p.m. along with Florinef 0.2 mg daily  Follows endocrinology outpatient  Increase dose to 40mg am/30mg pm awaiting ruling out infectious process/acute illness - ruled out. Restart home doses.

## 2025-01-08 NOTE — PLAN OF CARE
Problem: PHYSICAL THERAPY ADULT  Goal: Performs mobility at highest level of function for planned discharge setting.  See evaluation for individualized goals.  Description: Treatment/Interventions: Functional transfer training, LE strengthening/ROM, Therapeutic exercise, Endurance training, Patient/family training, Equipment eval/education, Gait training, Bed mobility, Elevations          See flowsheet documentation for full assessment, interventions and recommendations.  Note: Prognosis: Good  Problem List: Decreased strength, Decreased endurance, Impaired balance, Decreased mobility, Decreased coordination, Decreased cognition, Pain, Impaired judgement, Decreased safety awareness  Assessment: Pt seen for physical therapy evaluation, portion of which was performed as a co-evaluation w/ OT due to concerns re: medical stability.  PT portion focused on mobility assessment where OT portion focused more on ADLs, self care.  Pt is a 70 y/o male w/ history/comorbidities of a-fib, adrenal insufficiency, orthostasis who is now admitted after fall x 2  at home due to weakness, decreased LOC noted as well.  Undergoing w/u, also believed to have orthostasis.  Due to acute medical issues, unclear medical dx, pain, fall risk, note unstable clinical picture.  PT consulted to assess mobility, d/c needs.  Pt presents w/ decreased functional mob, standing balance, endurance, B LE strength, barriers at home.  Pt will benefit from skilled PT to correct for the above problems.  when stable, anticipate the need for Level III (minimal) post acute care therapy needs.        Rehab Resource Intensity Level, PT: II (Moderate Resource Intensity)    See flowsheet documentation for full assessment.

## 2025-01-08 NOTE — PLAN OF CARE

## 2025-01-08 NOTE — CASE MANAGEMENT
Case Management Assessment & Discharge Planning Note    Patient name Karson You  Location 2 215/CW2 215-02 MRN [id number]  : 1955 Date 2025       Current Admission Date: 2025  Current Admission Diagnosis:Fall   Patient Active Problem List    Diagnosis Date Noted Date Diagnosed    Leukocytosis 2025     Type 1 diabetes mellitus with hyperglycemia (HCC) 2024     Urinary incontinence 2024     PAD (peripheral artery disease) ([facility]) 2024     Stage 2 chronic kidney disease 2024     Mixed hyperlipidemia 2024     Morbid (severe) obesity due to excess calories ([facility]) 2024     Vitamin D deficiency 2024     Chronic diastolic heart failure ([facility]) 2023     Prolonged Q-T interval on ECG 2023     Hypokalemia 2023     Hypomagnesemia 2023     Hypotension/syncope 11/10/2023     Fall 11/10/2023     Adrenal insufficiency ([facility]) 11/10/2023     Orthostatic hypotension 11/10/2023     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[facility]) 11/10/2023     History of CVA (cerebrovascular accident) 11/10/2023     Paroxysmal atrial fibrillation ([facility]) 11/10/2023     Spells of decreased attentiveness 2023     Cerebrovascular accident (CVA) ([facility]) 2023     Iron deficiency anemia, unspecified 2023     Syncope 2023     Unintentional weight loss 2022     Recurrent hypoglycemia 10/19/2022     Type 1 diabetes mellitus with diabetic autonomic (poly)neuropathy ([facility]) 10/19/2022     Paroxysmal atrial fibrillation  10/19/2022     Anemia of chronic disease 10/19/2022     Psychiatric disorder 10/19/2022       LOS (days): 0  Geometric Mean LOS (GMLOS) (days):   Days to GMLOS:     OBJECTIVE:       Current admission status: Observation    Preferred Pharmacy:   [facility] - [place] - [address]  [address]  [place] [address]  Phone: [phone] Fax: [phone]    [id number] - [place], [place] - [address]  [address] [place] [id number]  Phone: [phone] Fax: [phone]    [facility] - [place], [place] - [address]  [address] [address]  Phone: [phone] Fax: [phone]    Primary Care Provider: NACHO Kenney    Primary Insurance: [employer] [facility]  Secondary Insurance:     ASSESSMENT:  Active Health Care Proxies       Ofelia Raymond [facility] Care Representative - Daughter   Primary Phone: [phone] (Mobile)  Home Phone: [phone]                 Advance Directives  Does patient have a Health Care POA?: No  Was patient offered paperwork?: Yes (daughter in the process of completing paperwork)  Does patient have Advance Directives?: No  Was patient offered paperwork?: Yes  Primary Contact: Ofelia Laytonsto (Daughter)    Patient Information  Admitted from:: Home  Mental Status: Alert  During Assessment patient was accompanied by: Other-Comment, Daughter (caregiver at bedside, Daughter via TC)  Assessment information provided by:: Patient, Daughter, Other - please comment (Caregiver at bedside, Daughter via TC)  Primary Caregiver: Private caregiver  Caregiver's Name:: Private paid caregiver  Caregiver's Relationship to Patient:: Other (Specify)  Support Systems: Daughter, Private Caregivers  Home entry access options. Select all that apply.: Stairs  Number of steps to enter home.: 6 (6 in front of home, 4 steps to enter home through the back.)  Type of Current Residence: [address] [place] home  Upon entering residence, is there a bedroom on the main floor (no further steps)?: Yes  Upon entering residence, is there a bathroom on the main floor (no further steps)?: Yes (Half bathroom, full bathroom on second floor.)  Living Arrangements: Lives w/ Family members    Activities of Daily Living Prior to Admission  Functional Status: Assistance  Completes ADLs independently?: No  Level of ADL dependence: Assistance  Ambulates independently?:  No  Level of ambulatory dependence: Assistance  Does patient use assisted devices?: Yes  Assisted Devices (DME) used: Walker  Does patient currently own DME?: Yes  What DME does the patient currently own?: Walker  Does patient have a history of Outpatient Therapy (PT/OT)?: Yes  Does the patient have a history of Short-Term Rehab?: Yes ([facility])  Does patient have a history of HHC?: Yes ([facility])  Does patient currently have HHC?: No    Patient Information Continued  Does patient have prescription coverage?: Yes  Does patient have a history of substance abuse?: No  Does patient have a history of Mental Health Diagnosis?: No    Means of Transportation  Means of Transport to [facility]:: Family transport (Daughter vs private caregiver)          DISCHARGE DETAILS:    Discharge planning discussed with:: Pt and private caregiver at bedside, daughter via TC  Freedom of Choice: Yes  Comments - Freedom of Choice: Therapy recommendations discussed, both pt and daughter interested in STR, would prefer referral to [facility] and [facility] SNF's as backup option  CM contacted family/caregiver?: Yes (Daughter)  Were Treatment Team discharge recommendations reviewed with patient/caregiver?: Yes  Did patient/caregiver verbalize understanding of patient care needs?: N/A- going to facility  Were patient/caregiver advised of the risks associated with not following Treatment Team discharge recommendations?: Yes    Contacts  Patient Contacts: Daughter- Ofelia  Relationship to Patient:: Family  Contact Method: Phone  Phone Number: [phone]  Reason/Outcome: Referral, Discharge Planning    Initial assessment completed with pt and caregiver at bedside, daughter Ofelia also present via TC. Pt resides in a 2 story home with [address] and first floor set-up upon entry. Half bathroom on first floor and full bathroom on second floor. Per pt and daughter, pt requires assistance with all ADL's and ambulation which is provided by  private pay caregiver 7 days per week- 11am-1pm. Daughter assists with additional support and transportation. Pt owns DME which includes: RW. Prior HHC hx through [facility] and STR hx at  [facility].     Per rounds with therapy this AM, pt recommended for inpatient STR. Discussed recommendations with both pt and daughter. Both in agreement with plan for STR. Pt and daughter requested referral to [facility] as first placement choice and blanket SNF referrals as back up. STR rehab referrals placed via A[facility]IN, CM to upload PT/OT notes in AIDIN when available. CM team will continue to follow and remain available.     Other Referral/Resources/Interventions Provided:  Interventions: Short Term Rehab    Treatment Team Recommendation: Short Term Rehab  Discharge Destination Plan:: Short Term Rehab

## 2025-01-08 NOTE — PLAN OF CARE
Problem: OCCUPATIONAL THERAPY ADULT  Goal: Performs self-care activities at highest level of function for planned discharge setting.  See evaluation for individualized goals.  Description: Treatment Interventions: ADL retraining, Functional transfer training, UE strengthening/ROM, Endurance training, Cognitive reorientation, Patient/family training, Equipment evaluation/education, Compensatory technique education, Continued evaluation, Energy conservation, Activityengagement  Equipment Recommended:  (tbd)       See flowsheet documentation for full assessment, interventions and recommendations.   Note: Limitation: Decreased ADL status, Decreased cognition, Decreased Safe judgement during ADL, Decreased endurance, Decreased self-care trans, Decreased high-level ADLs, Decreased UE strength  Prognosis: Fair  Assessment: Pt is a 69 y.o. male who was [facility] to [facility] on 1/7/2025 with Fall 2 today brief unresponsiveness, (-)HS, orthostatic hypotension low BP 92/55, history of CVA, a-fib, CHF, diabetes. Patient  has a past medical history of A-fib ([facility]), Adrenal insufficiency ([facility]), Atrial fibrillation ([facility]), Diabetes mellitus ([facility]), History of stroke (10/19/2022), Obesity, morbid ([facility]) (11/14/2022), Shock ([facility]) (12/14/2023), Stroke ([facility]), and Type 2 MI (myocardial infarction) ([facility]) (11/03/2022).  At baseline pt was completing assistance with ALD's/IADL's, . Pt lives with SO in a 2SH with a first floor set-up and FFOS to shower. Currently pt requires mod/max a  for overall ADLS and min a with RW for functional transfers. Pt currently presents with impairments in the following categories -steps to enter environment, difficulty performing ADLS, difficulty performing IADLS , limited insight into deficits, compliance, flat affect, and decreased initiation and engagement  activity tolerance, endurance, memory, insight, safety , judgement , attention , sequencing , communication, and interpersonal skills. These  impairments, as well as pt's fatigue and risk for falls  limit pt's ability to safely engage in all baseline areas of occupation, includingeating, grooming, bathing, dressing, toileting, functional mobility/transfers, and community mobility From OT standpoint, recommend mod level II upon D/C. The patient's raw score on the -PAC Daily Activity Inpatient Short Form is 13. A raw score of less than 19 suggests the patient may benefit from discharge to post-acute rehabilitation services. Please refer to the recommendation of the Occupational Therapist for safe discharge planning. OT will continue to follow to address the below stated goals.     Rehab Resource Intensity Level, OT: II (Moderate Resource Intensity)

## 2025-01-08 NOTE — PHYSICAL THERAPY NOTE
Physical Therapy Evaluation    Patient's Name: Karson You    Admitting Diagnosis  Syncope [R55]    Problem List  Patient Active Problem List   Diagnosis    Hypotension/syncope    Fall    Adrenal insufficiency (HCC)    Orthostatic hypotension    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HCC)    History of CVA (cerebrovascular accident)    Paroxysmal atrial fibrillation (HCC)    Recurrent hypoglycemia    Type 1 diabetes mellitus with diabetic autonomic (poly)neuropathy (HCC)    Paroxysmal atrial fibrillation     Anemia of chronic disease    Psychiatric disorder    Unintentional weight loss    Syncope    Iron deficiency anemia, unspecified    Cerebrovascular accident (CVA) (HCC)    Spells of decreased attentiveness    Hypokalemia    Hypomagnesemia    Prolonged Q-T interval on ECG    Chronic diastolic heart failure (HCC)    Vitamin D deficiency    Morbid (severe) obesity due to excess calories (HCC)    PAD (peripheral artery disease) (HCC)    Stage 2 chronic kidney disease    Mixed hyperlipidemia    Urinary incontinence    Type 1 diabetes mellitus with hyperglycemia (HCC)    Leukocytosis       Past Medical History  Past Medical History:   Diagnosis Date    A-fib (HCC)     Adrenal insufficiency (HCC)     Atrial fibrillation (HCC)     Diabetes mellitus (HCC)     History of stroke 10/19/2022    Obesity, morbid (HCC) 11/14/2022    Shock (HCC) 12/14/2023    Stroke (HCC)     Type 2 MI (myocardial infarction) (HCC) 11/03/2022       Past Surgical History  History reviewed. No pertinent surgical history.           01/08/25 0935   PT Last Visit   PT Visit Date 01/08/25   Note Type   Note type Evaluation   Pain Assessment   Pain Assessment Tool 0-10   Pain Score 4   Pain Location/Orientation Location: Generalized   Patient's Stated Pain Goal No pain   Hospital Pain Intervention(s) Repositioned   Restrictions/Precautions   Weight Bearing Precautions Per Order No   Other Precautions Multiple lines;Telemetry;Fall Risk;Pain;Cognitive;Chair  Alarm;Bed Alarm  (bed alarm re-armed post session)   Home Living   Type of Home House   Additional Comments Per combination of pt who is confused and case mgmt who spoke w/ family and caregivers, resides w/ s/o in 2 story home w/ first floor setup.  Has ADL assist, ambulates w/ RW   Prior Function   Level of [place] Needs assistance with ADLs;Independent with functional mobility   Falls in the last 6 months 1 to 4   General   Family/Caregiver Present No   Cognition   Overall Cognitive Status Impaired   Arousal/Participation Arousable   Attention Difficulty attending to directions   Orientation Level Oriented to person;Disoriented to place;Disoriented to time;Disoriented to situation   Memory Unable to assess   Following Commands Follows one step commands inconsistently   Subjective   Subjective confused.  states he is less dizzy.  as pt stood for extended period, did have increased rigidity and tremor, w/ glazed look.  sat back onto bed, then assisted back to supine w/ improvement in responsiveness.   RLE Assessment   RLE Assessment   (strength grossly 3+ to 4-/5, assessed w/ mobility)   LLE Assessment   LLE Assessment   (strength grossly 3+ to 4-/5, assessed w/ mobility)   Coordination   Movements are Fluid and Coordinated 0   Coordination and Movement Description B LE ataxia   Bed Mobility   Supine to Sit 4  Minimal assistance   Additional items Assist x 1;Increased time required;Impulsive;Verbal cues;LE management   Sit to Supine 2  Maximal assistance   Additional items Assist x 1;Increased time required;Impulsive;Verbal cues;LE management  (more assist due to decreased responsiveness)   Additional Comments time spent to reposition in supine   Transfers   Sit to Stand 4  Minimal assistance   Additional items Assist x 1;Increased time required;Impulsive;Verbal cues   Stand to Sit 3  Moderate assistance   Additional items Assist x 1;Increased time required;Impulsive;Verbal cues  (increased assist as pt becomes  less responsive)   Ambulation/Elevation   Gait pattern   (slow, ataxia, short step length, forward flexion)   Gait Assistance 3  Moderate assist   Additional items Assist x 1   Assistive Device Rolling walker   Distance 1-2' from foot of bed to HOB.  Orthostatics taken- BPs; supine- 104/60, sitting, 105/55, standing 129/104.  stood x 3-4 min, then had increased rigidity and tremor w/ decreased responsitvness.  sat back on EOB, and repositioned back in supine.  more responsive once in supine   Balance   Static Sitting Fair +   Dynamic Sitting Fair -   Static Standing Poor +   Dynamic Standing Poor   Ambulatory Poor   Endurance Deficit   Endurance Deficit Yes   Endurance Deficit Description fatigue, weakness, pain, cog   Activity Tolerance   Activity Tolerance Patient limited by fatigue;Patient limited by pain;Treatment limited secondary to medical complications (Comment)   Nurse Made Aware yes   Assessment   Prognosis Good   Problem List Decreased strength;Decreased endurance;Impaired balance;Decreased mobility;Decreased coordination;Decreased cognition;Pain;Impaired judgement;Decreased safety awareness   Assessment Pt seen for physical therapy evaluation, portion of which was performed as a co-evaluation w/ OT due to concerns re: medical stability.  PT portion focused on mobility assessment where OT portion focused more on ADLs, self care.  Pt is a 70 y/o male w/ history/comorbidities of a-fib, adrenal insufficiency, orthostasis who is now admitted after fall x 2  at home due to weakness, decreased LOC noted as well.  Undergoing w/u, also believed to have orthostasis.  Due to acute medical issues, unclear medical dx, pain, fall risk, note unstable clinical picture.  PT consulted to assess mobility, d/c needs.  Pt presents w/ decreased functional mob, standing balance, endurance, B LE strength, barriers at home.  Pt will benefit from skilled PT to correct for the above problems.  when stable, anticipate the need for  Level III (minimal) post acute care therapy needs.   Goals   Patient Goals none identified   STG Expiration Date 01/22/25   Short Term Goal #1 1-2 wks: bed mob and transfers w/ indep, standing balance to good w/ device, ambulate 100-150 ft w. RW and S/mod I, increase B LE strength by 1/2 -1 grade, ambulate 3-5 stairs w S   PT Treatment Day 0   Plan   Treatment/Interventions Functional transfer training;LE strengthening/ROM;Therapeutic exercise;Endurance training;Patient/family training;Equipment eval/education;Gait training;Bed mobility;Elevations   PT Frequency 3-5x/wk   Discharge Recommendation   Rehab Resource Intensity Level, PT II (Moderate Resource Intensity)   AM-PAC Basic Mobility Inpatient   Turning in Flat Bed Without Bedrails 3   Lying on Back to Sitting on Edge of Flat Bed Without Bedrails 3   Moving Bed to Chair 2   Standing Up From Chair Using Arms 3   Walk in Room 2   Climb 3-5 Stairs With Railing 1   Basic Mobility Inpatient Raw Score 14   Basic Mobility Standardized Score 35.55   [facility] Highest Level Of Mobility   -HLM Goal 4: Move to chair/commode   -HLM Achieved 5: Stand (1 or more minutes)     Trevon Alba PT, DPT, CSRS

## 2025-01-08 NOTE — OCCUPATIONAL THERAPY NOTE
Occupational Therapy Evaluation     Patient Name: Karson You  Today's Date: 1/8/2025  Problem List  Principal Problem:    Fall  Active Problems:    Adrenal insufficiency (HCC)    Orthostatic hypotension    History of CVA (cerebrovascular accident)    Paroxysmal atrial fibrillation (HCC)    Hypomagnesemia    Chronic diastolic heart failure (HCC)    Type 1 diabetes mellitus with hyperglycemia (HCC)    Leukocytosis    Past Medical History  Past Medical History:   Diagnosis Date    A-fib (HCC)     Adrenal insufficiency (HCC)     Atrial fibrillation (HCC)     Diabetes mellitus (HCC)     History of stroke 10/19/2022    Obesity, morbid (HCC) 11/14/2022    Shock (HCC) 12/14/2023    Stroke (HCC)     Type 2 MI (myocardial infarction) (HCC) 11/03/2022     Past Surgical History  History reviewed. No pertinent surgical history.      01/08/25 0926   OT Last Visit   OT Visit Date 01/08/25   Note Type   Note type Evaluation   Pain Assessment   Pain Assessment Tool 0-10   Pain Score No Pain   Restrictions/Precautions   Weight Bearing Precautions Per Order No   Other Precautions Cognitive;Chair Alarm;Bed Alarm;Telemetry;Fall Risk   Home Living   Type of Home House   Home Layout Two level;Bed/bath upstairs;1/2 bath on main level  (pt lives in a 2SH with bedroom 1/2 bath on first, full bathroom on 2nd floor)   Home Equipment Walker   Prior Function   Level of [place] Needs assistance with ADLs;Needs assistance with IADLS   Lives With Significant other   Receives Help From Family;Personal care attendant  (son, daughter, DIL)   IADLs Family/Friend/Other provides transportation;Family/Friend/Other provides meals;Family/Friend/Other provides medication management   Vocational Retired   Lifestyle   Autonomy Assistance from HHA/SO/family with ADL's/IaDL's   Reciprocal Relationships HHA (7 days a week 11-1 pm), sister,daughter   Service to Others retired   Intrinsic Gratification watch law and order   General   Family/Caregiver  Present No   ADL   Eating Assistance 5  Supervision/Setup   Grooming Assistance 4  Minimal Assistance   UB Bathing Assistance 3  Moderate Assistance   LB Bathing Assistance 2  Maximal Assistance   UB Dressing Assistance 3  Moderate Assistance   LB Dressing Assistance 2  Maximal Assistance   Toileting Assistance  2  Maximal Assistance   Bed Mobility   Supine to Sit 4  Minimal assistance,   Sit to Supine 4  Minimal assistance   Transfers   Sit to Stand 4  Minimal assistance   Additional items Assist x 1   Stand to Sit 4  Minimal assistance   Additional items Assist x 1   Additional Comments +RW   Functional Mobility   Additional Comments unsafe to assess this session 2* to 'staring spell, mild tremor' transferred to supine, resolved in supine. BP, vitals WFL   Balance   Static Sitting Fair   Dynamic Sitting Fair -   Static Standing Poor +   Dynamic Standing Poor +   Ambulatory Poor +   Activity Tolerance   Activity Tolerance Patient limited by fatigue   Medical Staff Made Aware PT Trevon due to the patient's co-morbidities, clinically unstable presentation, and present impairments which are a regression from the patient's baseline.    Nurse Made Aware Rn cleared pt for therapy   RUE Assessment   RUE Assessment WFL  (appeared WFL with bed mobility/grasping RW)   LUE Assessment   LUE Assessment WFL  (appeared WFL with bed mobility/grasping RW)   Cognition   Overall Cognitive Status (S)  Impaired   Arousal/Participation Responsive;Alert;Cooperative   Attention Difficulty attending to directions   Orientation Level Oriented to person;Disoriented to time;Disoriented to place;Disoriented to situation   Memory Decreased recall of precautions;Decreased recall of recent events;Decreased short term memory;Decreased recall of biographical information;Decreased long term memory   Following Commands Follows one step commands inconsistently   Comments pt agreeable to therapy, flat affect, confused, disoriented~ educated pt on defer  to white board for daily orientation tasks, poor historian (unable to recall daily HHA with social history.  Poor cognition with evaluation.   Assessment   Limitation Decreased ADL status;Decreased cognition;Decreased Safe judgement during ADL;Decreased endurance;Decreased self-care trans;Decreased high-level ADLs;Decreased UE strength   Prognosis Fair   Assessment Pt is a 69 y.o. male who was admitted to [facility] on 1/7/20[facility] with Fall 2 today brief unresponsiveness, (-)HS, orthostatic hypotension low BP 92/55, history of CVA, a-fib, CHF, diabetes. Patient  has a past medical history of A-fib ([facility]), Adrenal insufficiency ([facility]), Atrial fibrillation ([facility]), Diabetes mellitus ([facility]), History of stroke (10/19/2022), Obesity, morbid ([facility]) (11/14/2022), Shock ([facility]) (12/14/2023), Stroke ([facility]), and Type 2 MI (myocardial infarction) ([facility]) (11/03/2022).  At baseline pt was completing assistance with ALD's/IADL's, . Pt lives with SO in a 2SH with a first floor set-up and FFOS to shower. Currently pt requires mod/max a  for overall ADLS and min a with RW for functional transfers. Pt currently presents with impairments in the following categories -steps to enter environment, difficulty performing ADLS, difficulty performing IADLS , limited insight into deficits, compliance, flat affect, and decreased initiation and engagement  activity tolerance, endurance, memory, insight, safety , judgement , attention , sequencing , communication, and interpersonal skills. These impairments, as well as pt's fatigue and risk for falls  limit pt's ability to safely engage in all baseline areas of occupation, includingeating, grooming, bathing, dressing, toileting, functional mobility/transfers, and community mobility From OT standpoint, recommend mod level II upon D/C. The patient's raw score on the AM-PAC Daily Activity Inpatient Short Form is 13. A raw score of less than 19 suggests the patient may benefit from discharge to  post-acute rehabilitation services. Please refer to the recommendation of the Occupational Therapist for safe discharge planning. OT will continue to follow to address the below stated goals.   Goals   Patient Goals did not state   LTG Time Frame 10-14   Long Term Goal #1 see goals below   Plan   Treatment Interventions ADL retraining;Functional transfer training;UE strengthening/ROM;Endurance training;Cognitive reorientation;Patient/family training;Equipment evaluation/education;Compensatory technique education;Continued evaluation;Energy conservation;Activityengagement   Goal Expiration Date 01/22/25   OT Frequency 2-3x/wk   Discharge Recommendation   Rehab Resource Intensity Level, OT II (Moderate Resource Intensity)   Equipment Recommended (tbd)   AM-PAC Daily Activity Inpatient   Lower Body Dressing 2   Bathing 2   Toileting 2   Upper Body Dressing 2   Grooming 2   Eating 3   Daily Activity Raw Score 13   Daily Activity Standardized Score (Calc for Raw Score >=11) 32.03   AM-PAC Applied Cognition Inpatient   Following a Speech/Presentation 1   Understanding Ordinary Conversation 3   Taking Medications 1   Remembering Where Things Are Placed or Put Away 1   Remembering List of 4-5 Errands 1   Taking Care of Complicated Tasks 1   Applied Cognition Raw Score 8   Applied Cognition Standardized Score 19.32   End of Consult   Patient Position at End of Consult Supine;Bed/Chair alarm activated;All needs within reach   Nurse Communication Nurse aware of consult    Occupational Therapy Goals: (OTR to assess functional mobility as appropriate)    *S with bed mobility to engage in functional tasks.  *S Adl's after setup with use of AE PRN  *S toileting and clothing management   *S functional transfers to/from all surfaces with Fair + dynamic balance and safety for participation in dynamic adls and iadl tasks   *Demonstrate good carryover with safe use of RW during functional tasks   *Assess DME needs   *Increase activity  tolerance to 25-30 minutes for participation in adls and enjoyable activities  *Pt to participate in further cognitive testing with good attention and participation to assist with safe d/c recommendations  *Mod I with Simulated IADL management task.  *Demonstrate good carryover of pt/family education and training with good tolerance for increased safety and independence with ADL's/ADl's.  *Pt will improve standing balance to 4-5 minutes with functional tasks to increase I with toileting/transfers.  *Patient will demonstrate 100% carryover of energy conservation techniques t/o functional I/ADL/leisure tasks w/o cues s/p skilled education to increase endurance during functional tasks  *Pt will increase attention to follow 100% simple 2-3  step verbal commands and be A/Ox4 consistently t/o use of external environmental cues w/ mod I    Marilin Solis OTR/L

## 2025-01-08 NOTE — UTILIZATION REVIEW
Initial Clinical Review  OBSERVATION ADMISSION 1/7/2025 1258  CONVERTED TO  INPATIENT ADMISSION 1/8/2025 1218 DUE TO REQ > 2 MN FOR MANAGEMENT OF FALL W PERSISTENT HYPOTENSION PREVIOUSLY MAINTAINED ON MIDODRINE & NA TABS REQUIRING MEDICATION REGIMEN CHANGE  Admission: Date/Time/Statement:   Admission Orders (From admission, onward)       Ordered        01/08/25 1218  INPATIENT ADMISSION  Once            01/07/25 1258  Place in Observation  Once                          Orders Placed This Encounter   Procedures    INPATIENT ADMISSION     Standing Status:   Standing     Number of Occurrences:   1     Level of Care:   Med Surg [16]     Estimated length of stay:   More than 2 Midnights     Certification:   I certify that inpatient services are medically necessary for this patient for a duration of greater than two midnights. See H&P and MD Progress Notes for additional information about the patient's course of treatment.     ED Arrival Information       Expected   -    Arrival   1/7/2025 09:38    Acuity   Emergent              Means of arrival   Ambulance    Escorted by   [facility]    Service   Hospitalist    Admission type   Emergency              Arrival complaint   syncope             Chief Complaint   Patient presents with    Syncope     Coming from home. C/o weak legs which led him to fall on his buttocks. Feeling dizzy and off when he fell. Unknown loc. -hs. Hx orthostatic hypotension.        Initial Presentation: 69 y.o. male to ED by EMS as Observation admission due to Fall  PMH  Pmhx chronic diastolic HF, A-fib on Eliquis, adrenal insufficiency, orthostatic hypotension, recent DX PNA  presents with 2 falls today. 1 of episode while trying to go to the bathroom.  No head strike or LOC reported. Daughter reports he was briefly unresponsive and was gasping for air.   Daughter reports he has been congested and trying to clear his throat frequently.     EXAM  BP low  92/55 in the ED despite IV hydration  Labs  mild leukocytosis/ hypomagnsemia.. MAG repleted    Tele, obtain orthostatic vitals, Scheduled Midodrine 5 mg TID w holding parameters   (OP previously PRN OP), Continue Florinef and Cortef. Stocking compression and abdominal binder. Continue IVF. PT/OT evals, increase Hydrocortisone dose to 40mg am/30mg pm awaiting ruling out infectious process/acute illness, cont Eliquis not on any BB, OP maintain on daily Lasix; SSI, follow off antibx  Anticipated Length of Stay/Certification Statement:  Patient will be admitted on an observation basis with an anticipated length of stay of less than 2 midnights secondary to fall.   Date: 2025   Day 2: changed to Inpatient  Orthostatics positive. On scheduled midodrine 5mg TID with holding parameters ( daughter reports this is changed to prn only ). Continue Florinef and OP Cortef dose as infectious process ruled out. Stocking compression and abdominal binder. Eating & drinking well  Requires rehab  Date: 2025  Day 3: Has surpassed a 2nd midnight with active treatments and services.  Presents with Fall w HX orthostatic hypotension   On exam no events on tele  Abnormal labs or imagin out of 2 blood cultures positive for staph epidermis - suspect contaminant    Diagnosis/Plan      FALL  Now eating/ drinking well, DC IVF  PT/OT - rehab   Orthostatic hypotension  Midodrine 5 mg TID. Would keep as a standing dose with hold parameters as opposed to prn   Adrenal insufficiency   Increase dose to 40mg am/30mg pm awaiting ruling out infectious process/acute illness - ruled out. Following wo antibx. Restarted home doses .  Chronic diastolic HF  Lasix 20 mg daily prn   AFIb  On Eliquis/ no BB  ED Treatment-Medication Administration from 2025 0938 to 2025 1902         Date/Time Order Dose Route Action     2025 1016 multi-electrolyte (ISOLYTE-S PH 7.4) bolus 1,000 mL 1,000 mL Intravenous New Bag     2025 1118 magnesium sulfate 2 g/50 mL IVPB (premix)  2 g 2 g Intravenous New Bag     01/07/2025 1212 multi-electrolyte (ISOLYTE-S PH 7.4) bolus 1,000 mL 1,000 mL Intravenous New Bag     01/07/2025 1514 magnesium Oxide (MAG-OX) tablet 400 mg 400 mg Oral Given     01/07/2025 1818 magnesium Oxide (MAG-OX) tablet 400 mg 400 mg Oral Given     01/07/2025 1656 atorvastatin (LIPITOR) tablet 10 mg 10 mg Oral Given     01/07/2025 1516 fludrocortisone (FLORINEF) tablet 0.2 mg 0.2 mg Oral Given     01/07/2025 1515 sertraline (ZOLOFT) tablet 100 mg 100 mg Oral Given     01/07/2025 1515 hydrocortisone (CORTEF) tablet 30 mg 30 mg Oral Given     01/07/2025 1657 insulin lispro (HumALOG/ADMELOG) 100 units/mL subcutaneous injection 1-6 Units 6 Units Subcutaneous Given     01/07/2025 1657 insulin lispro (HumALOG/ADMELOG) 100 units/mL subcutaneous injection 5 Units 5 Units Subcutaneous Given     01/07/2025 1554 sodium chloride 0.9 % infusion 75 mL/hr Intravenous New Bag            Scheduled Medications:  apixaban, 5 mg, Oral, BID  aspirin, 81 mg, Oral, Daily  atorvastatin, 10 mg, Oral, Daily With Dinner  cyanocobalamin, 100 mcg, Oral, Daily  ferrous sulfate, 325 mg, Oral, Daily With Breakfast  fludrocortisone, 0.2 mg, Oral, Daily  hydrocortisone, 15 mg, Oral, Daily   And  hydrocortisone, 20 mg, Oral, QAM  insulin glargine, 18 Units, Subcutaneous, HS  insulin lispro, 1-6 Units, Subcutaneous, TID AC  insulin lispro, 1-6 Units, Subcutaneous, HS  insulin lispro, 8 Units, Subcutaneous, TID With Meals  magnesium Oxide, 400 mg, Oral, BID  midodrine, 5 mg, Oral, TID AC  sertraline, 100 mg, Oral, Daily      Continuous IV Infusions:   sodium chloride 0.9 % infusion  Rate: 75 mL/hr Dose: 75 mL/hr  Freq: Continuous Route: IV  Indications of Use: IV Hydration  Last Dose: Stopped (01/08/25 1254)  Start: 01/07/25 1400 End: 01/08/25 1236    PRN Meds:  acetaminophen, 650 mg, Oral, Q6H PRN  albuterol, 2 puff, Inhalation, Q6H PRN      ED Triage Vitals   Temperature Pulse Respirations Blood Pressure SpO2  Pain Score   01/07/25 1019 01/07/25 0954 01/07/25 0954 01/07/25 0954 01/07/25 0954 01/07/25 1205   98 °F (36.7 °C) 90 20 124/62 96 % No Pain     Weight (last 2 days)       None            Vital Signs (last 3 days)       Date/Time Temp Pulse Resp BP MAP (mmHg) SpO2 O2 Device Patient Position - Orthostatic VS [place] Coma Scale Score Pain    01/09/25 0900 -- -- -- -- -- -- None (Room air) -- 15 No Pain    01/09/25 06:13:31 -- 87 -- 150/89 109 95 % -- -- -- --    01/09/25 05:40:49 98.1 °F (36.7 °C) 91 20 162/103 123 96 % -- -- -- --    01/08/25 2045 -- -- -- -- -- -- None (Room air) -- 15 No Pain    01/08/25 19:34:17 -- 78 -- 126/76 93 97 % -- -- -- --    01/08/25 15:36:41 -- 76 -- 153/62 92 96 % -- -- -- --    01/08/25 10:40:19 -- 78 -- 140/73 95 97 % -- -- -- --    01/08/25 0935 -- -- -- -- -- -- -- -- 14 4    01/08/25 0926 -- -- -- -- -- -- -- -- -- No Pain    01/08/25 08:51:09 -- 69 16 117/70 86 95 % -- Standing - Orthostatic VS -- --    01/08/25 08:48:59 -- 91 16 122/70 87 96 % -- Sitting - Orthostatic VS -- --    01/08/25 07:35:58 97.6 °F (36.4 °C) 82 16 165/88 114 97 % -- Lying - Orthostatic VS -- --    01/08/25 02:29:58 98.4 °F (36.9 °C) 82 -- 143/66 92 96 % -- -- -- --    01/08/25 0000 -- -- -- -- -- -- -- -- 15 No Pain    01/07/25 22:30:40 98.6 °F (37 °C) 85 -- 120/61 81 97 % -- -- -- --    01/07/25 19:56:19 99.3 °F (37.4 °C) 83 -- 118/61 80 98 % -- -- -- --    01/07/25 19:55:49 -- 80 -- 118/61 80 98 % -- -- -- --    01/07/25 1813 -- -- -- -- -- 97 % None (Room air) -- -- --    01/07/25 1800 -- 78 18 100/60 75 98 % -- -- -- No Pain    01/07/25 1700 -- 82 18 125/60 86 98 % -- -- -- No Pain    01/07/25 1600 -- 84 18 116/56 81 93 % -- -- -- --    01/07/25 1515 -- 82 -- 95/51 -- 95 % -- -- -- --    01/07/25 1332 -- -- -- 88/51 -- -- -- Lying - Orthostatic VS -- --    01/07/25 1235 -- 86 18 101/56 75 97 % None (Room air) Lying -- --    01/07/25 1205 -- 87 20 92/55  69 98 % None (Room air) -- -- No Pain    01/07/25  1116 -- 88 20 147/67 -- 98 % -- -- -- --    01/07/25 1019 98 °F (36.7 °C) -- -- -- -- -- -- -- -- --    01/07/25 0958 -- -- -- -- -- -- -- -- 15 --    01/07/25 0954 -- 90 20 124/62 -- 96 % None (Room air) -- -- --              Pertinent Labs/Diagnostic Test Results:   Radiology:  XR chest 2 views   ED Interpretation by Rich Hightower MD (01/07 1218)   No acute cardiopulmonary disease.      Final Interpretation by Hillary Amanda MD (01/07 1242)      No acute cardiopulmonary disease.      No acute displaced fractures.      Workstation performed: [id number]           Cardiology:  ECG 12 lead   Final Result by Vladimir Torre MD (01/07 1800)   ** Age and gender specific ECG analysis *   Atrial fibrillation   ST & T wave abnormality, consider inferior ischemia or digitalis effect   Abnormal ECG   When compared with ECG of 07-Jan-2025 09:52, (unconfirmed)   Atrial fibrillation has replaced Junctional rhythm   T wave inversion now evident in Inferior leads   Confirmed by Vladimir Torre ([id number]) on 1/7/2025 5:59:56 PM      ECG 12 lead   Final Result by Vladimir Torre MD (01/07 1806)   * Age and gender specific ECG analysis **   Sinus rhythm with occasional Premature ventricular complexes   Nonspecific ST and T wave abnormality   Abnormal ECG   When compared with ECG of 27-Aug-2024 16:38,   Nonspecific T wave abnormality, improved in Inferior leads   Confirmed by Vladimir Torre ([id number]) on 1/7/2025 6:06:56 PM        GI:  No orders to display       Results from last 7 days   Lab Units 01/07/25  1338   SARS-COV-2  Negative     Results from last 7 days   Lab Units 01/08/25  0515 01/07/25  1017   WBC Thousand/uL 12.29* 13.27*   HEMOGLOBIN g/dL 12.1 12.5   HEMATOCRIT % 37.2 39.2   PLATELETS Thousands/uL 138* 135*   TOTAL NEUT ABS Thousands/µL  --  9.17*         Results from last 7 days   Lab Units 01/08/25  0515 01/07/25  1017   SODIUM mmol/L 137 136   POTASSIUM mmol/L 3.9 4.3   CHLORIDE mmol/L 104 105   CO2 mmol/L 23 22   ANION  GAP mmol/L 10 9   BUN mg/dL 25 22   CREATININE mg/dL 1.02 1.04   EGFR ml/min/1.73sq m 75 73   CALCIUM mg/dL 7.7* 7.8*   MAGNESIUM mg/dL  --  1.7*     Results from last 7 days   Lab Units 01/07/25  1017   AST U/L 12*   ALT U/L 11   ALK PHOS U/L 78   TOTAL PROTEIN g/dL 5.8*   ALBUMIN g/dL 3.3*   TOTAL BILIRUBIN mg/dL 0.91     Results from last 7 days   Lab Units 01/09/25  1028 01/09/25  0607 01/09/25  0457 01/09/25  0356 01/08/25  2012 01/08/25  1615 01/08/25  1122 01/08/25  0847 01/07/25  2113 01/07/25  1649 01/07/25  0950   POC GLUCOSE mg/dl 228* 184* 147* 72 234* 193* 157* 171* 145* 360* 282*     Results from last 7 days   Lab Units 01/08/25  0515 01/07/25  1017   GLUCOSE RANDOM mg/dL 177* 266*             Beta- Hydroxybutyrate   Date Value Ref Range Status   08/27/2024 0.07 0.02 - 0.27 mmol/L Final                                          Results from last 7 days   Lab Units 01/07/25  1344   LACTIC ACID mmol/L 2.3*                                                 Results from last 7 days   Lab Units 01/07/25  1339   CLARITY UA  Clear   COLOR UA  Light Yellow   SPEC GRAV UA  1.014   PH UA  5.5   GLUCOSE UA mg/dl 500 (1/2%)*   KETONES UA mg/dl Trace*   BLOOD UA  Negative   PROTEIN UA mg/dl Negative   NITRITE UA  Negative   BILIRUBIN UA  Negative   UROBILINOGEN UA (BE) mg/dl <2.0   LEUKOCYTES UA  Negative   WBC UA /hpf None Seen   RBC UA /hpf 1-2   BACTERIA UA /hpf None Seen   EPITHELIAL CELLS WET PREP /hpf None Seen     Results from last 7 days   Lab Units 01/07/25  1338   INFLUENZA A PCR  Negative   INFLUENZA B PCR  Negative   RSV PCR  Negative                             Results from last 7 days   Lab Units 01/07/25  1350 01/07/25  1344   BLOOD CULTURE   --  No Growth at 24 hrs.   GRAM STAIN RESULT  Gram positive cocci in clusters*  --                    Past Medical History:   Diagnosis Date    A-fib (HCC)     Adrenal insufficiency (HCC)     Atrial fibrillation (HCC)     Diabetes mellitus (HCC)     History of  stroke 10/19/2022    Obesity, morbid (HCC) 11/14/2022    Shock (HCC) 12/14/2023    Stroke (HCC)     Type 2 MI (myocardial infarction) (HCC) 11/03/2022     Present on Admission:   Adrenal insufficiency (HCC)   Type 1 diabetes mellitus with hyperglycemia (HCC)   Chronic diastolic heart failure (HCC)   Hypomagnesemia   Orthostatic hypotension   Paroxysmal atrial fibrillation (HCC)   Fall      Admitting Diagnosis: Syncope [R55]  Age/Sex: 69 y.o. male    Network Utilization Review Department  ATTENTION: Please call with any questions or concerns to [phone] and carefully listen to the prompts so that you are directed to the right person. All voicemails are confidential.   For Discharge needs, contact Care Management DC Support Team at [phone] opt. [phone]  Send all requests for admission clinical reviews, approved or denied determinations and any other requests to dedicated fax number below belonging to the campus where the patient is receiving treatment. List of dedicated fax numbers for the Facilities:  FACILITY NAME UR FAX NUMBER   ADMISSION DENIALS (Administrative/Medical Necessity) [phone]   DISCHARGE SUPPORT TEAM (NETWORK) [phone]   PARENT CHILD HEALTH (Maternity/NICU/Pediatrics) [phone]   [facility] [phone]   [facility] [phone]   [facility] [phone]   [facility] [phone]   [facility] [phone]   [facility] [phone]   [facility] [phone]   [facility] [phone]   [facility] [phone]   [facility] [phone]   [facility] [phone]   [facility] [phone]

## 2025-01-08 NOTE — ASSESSMENT & PLAN NOTE
Lab Results   Component Value Date    HGBA1C 7.0 (H) 06/13/2024       Recent Labs     01/07/25  1649 01/07/25  2113 01/08/25  0847 01/08/25  1122   POCGLU 360* 145* 171* 157*       Blood Sugar Average: Last 72 hrs:  (P) 223    Home regimen: Toujeo 18 units daily and lispro 8 units 3 times daily with meals  Continue home regimen with correctional scale  Diabetic diet  Hypoglycemia protocol

## 2025-01-08 NOTE — ASSESSMENT & PLAN NOTE
Pmhx of adrenal insufficiency and orthostatic hypotension who presents with 2 falls today. 1 of episode while trying to go to the bathroom.  No head strike or LOC reported. Daughter reports he was briefly unresponsive and was gasping for air.   BP low in the 92/55 in the ED despite IV hydration therefore ED requests further monitoring with medicine  Suspect  presentation secondary to orthostatic hypotension.   Orthostatics positive. Placed on scheduled midodrine 5mg TID with holding parameters ( daughter reports this is changed to prn only ). Continue Florinef and Cortef. Stocking compression and abdominal binder.   Heplock IVF. Patient eating and drinking well  Telemetry - no events  PT/OT - rehab

## 2025-01-08 NOTE — ASSESSMENT & PLAN NOTE
Known history with previously maintained on midodrine and salt tabs which have now since then been transition to as needed given outpatient concern for volume overload  Presenting with persistent hypotension in the setting of falls  Continue hydrocortisone as below  Midodrine 5 mg TID and up-titrate as needed.   Monitor orthostatic vitals - positive 1/8  Continue compression stockings and abdominal binders when ambulating

## 2025-01-08 NOTE — PLAN OF CARE

## 2025-01-08 NOTE — PROGRESS NOTES
Progress Note - Hospitalist   Name: Karson You 69 y.o. male I MRN: [id number]  Unit/Bed#: CW2 215-02 I Date of Admission: 1/7/2025   Date of Service: 1/8/2025 I Hospital Day: 0    Assessment & Plan  Fall  Pmhx of adrenal insufficiency and orthostatic hypotension who presents with 2 falls today. 1 of episode while trying to go to the bathroom.  No head strike or LOC reported. Daughter reports he was briefly unresponsive and was gasping for air.   BP low in the 92/55 in the ED despite IV hydration therefore ED requests further monitoring with medicine  Suspect  presentation secondary to orthostatic hypotension.   Orthostatics positive. Placed on scheduled midodrine 5mg TID with holding parameters ( daughter reports this is changed to prn only ). Continue Florinef and Cortef. Stocking compression and abdominal binder.   Heplock IVF. Patient eating and drinking well  Telemetry - no events  PT/OT - rehab  Adrenal insufficiency (HCC)  Maintained on hydrocortisone 20 mg a.m. and 15 mg p.m. along with Florinef 0.2 mg daily  Follows endocrinology outpatient  Increase dose to 40mg am/30mg pm awaiting ruling out infectious process/acute illness - ruled out. Restart home doses.   Orthostatic hypotension  Known history with previously maintained on midodrine and salt tabs which have now since then been transition to as needed given outpatient concern for volume overload  Presenting with persistent hypotension in the setting of falls  Continue hydrocortisone as below  Midodrine 5 mg TID and up-titrate as needed.   Monitor orthostatic vitals - positive 1/8  Continue compression stockings and abdominal binders when ambulating  History of CVA (cerebrovascular accident)  Continue aspirin and statin  Paroxysmal atrial fibrillation (HCC)  Rate controlled  Continue Eliquis  Not on any beta-blocker  Hypomagnesemia  Magnesium 1.7, repleted.  Monitor  Chronic diastolic heart failure (HCC)  Wt Readings from Last 3 Encounters:   09/05/24  77.8 kg (171 lb 9.6 oz)   06/27/24 80.5 kg (177 lb 6.4 oz)   06/13/24 88.9 kg (196 lb)     12/2023 TTE EF 60%  Maintained on Lasix 20 mg daily prn.   I/Os  Type 1 diabetes mellitus with hyperglycemia (HCC)  Lab Results   Component Value Date    HGBA1C 7.0 (H) 06/13/2024       Recent Labs     01/07/25  1649 01/07/25  2113 01/08/25  0847 01/08/25  1122   POCGLU 360* 145* 171* 157*       Blood Sugar Average: Last 72 hrs:  (P) 223    Home regimen: Toujeo 18 units daily and lispro 8 units 3 times daily with meals  Continue home regimen with correctional scale  Diabetic diet  Hypoglycemia protocol  Leukocytosis  Wbc 13.27 with reported increased work of breathing and congestion. Denies respiratory symptoms 1/8  Possibly steroid induced or leukemoid reaction to fall.   Follow up infectious workup including Bcx - pending, UA - negative, CXR - clear and COVID/Flu - negative  Monitor off abx      VTE Pharmacologic Prophylaxis: VTE Score: 3 Moderate Risk (Score 3-4) - Pharmacological DVT Prophylaxis Ordered: apixaban (Eliquis).    Mobility:   JH-HLM Achieved: 5: Stand (1 or more minutes)  JH-HLM Goal NOT achieved. Continue with multidisciplinary rounding and encourage appropriate mobility to improve upon JH-HLM goals.    Patient Centered Rounds: I performed bedside rounds with nursing staff today.   Discussions with Specialists or Other Care Team Provider: case management    Education and Discussions with Family / Patient: Updated  (daughter) via phone.    Current Length of Stay: 0 day(s)  Current Patient Status: Inpatient   Certification Statement: The patient, admitted on an observation basis, will now require > 2 midnight hospital stay due to orthostatic hypotension management and rehab placement  Discharge Plan: Anticipate discharge in 24-48 hrs to rehab facility.    Code Status: Level 1 - Full Code    Subjective   Offers no complaints.     Objective :  Temp:  [97.6 °F (36.4 °C)-99.3 °F (37.4 °C)] 97.6 °F  (36.4 °C)  HR:  [69-91] 78  BP: ()/(51-88) 140/73  Resp:  [16-18] 16  SpO2:  [93 %-98 %] 97 %  O2 Device: None (Room air)    There is no height or weight on file to calculate BMI.     Input and Output Summary (last 24 hours):     Intake/Output Summary (Last 24 hours) at 1/8/2025 1224  Last data filed at 1/8/2025 0828  Gross per 24 hour   Intake 1240 ml   Output 650 ml   Net 590 ml       Physical Exam  Constitutional:       General: He is not in acute distress.     Appearance: He is well-developed.   HENT:      Head: Normocephalic and atraumatic.   Cardiovascular:      Rate and Rhythm: Normal rate and regular rhythm.      Heart sounds: No murmur heard.  Pulmonary:      Effort: Pulmonary effort is normal. No respiratory distress.      Breath sounds: Normal breath sounds. No wheezing or rales.   Abdominal:      General: Bowel sounds are normal. There is no distension.      Palpations: Abdomen is soft.   Musculoskeletal:      Cervical back: Normal range of motion and neck supple.   Skin:     General: Skin is warm and dry.      Findings: No rash.   Neurological:      Mental Status: He is alert and oriented to person, place, and time.      Cranial Nerves: No cranial nerve deficit.           Lines/Drains:              Lab Results: I have reviewed the following results:   Results from last 7 days   Lab Units 01/08/25  0515 01/07/25  1017   WBC Thousand/uL 12.29* 13.27*   HEMOGLOBIN g/dL 12.1 12.5   HEMATOCRIT % 37.2 39.2   PLATELETS Thousands/uL 138* 135*   SEGS PCT %  --  69   LYMPHO PCT %  --  15   MONO PCT %  --  14*   EOS PCT %  --  1     Results from last 7 days   Lab Units 01/08/25  0515 01/07/25  1017   SODIUM mmol/L 137 136   POTASSIUM mmol/L 3.9 4.3   CHLORIDE mmol/L 104 105   CO2 mmol/L 23 22   BUN mg/dL 25 22   CREATININE mg/dL 1.02 1.04   ANION GAP mmol/L 10 9   CALCIUM mg/dL 7.7* 7.8*   ALBUMIN g/dL  --  3.3*   TOTAL BILIRUBIN mg/dL  --  0.91   ALK PHOS U/L  --  78   ALT U/L  --  11   AST U/L  --  12*    GLUCOSE RANDOM mg/dL 177* 266*         Results from last 7 days   Lab Units 01/08/25  1122 01/08/25  0847 01/07/25  2113 01/07/25  1649 01/07/25  0950   POC GLUCOSE mg/dl 157* 171* 145* 360* 282*         Results from last 7 days   Lab Units 01/07/25  1344   LACTIC ACID mmol/L 2.3*       Recent Cultures (last 7 days):   Results from last 7 days   Lab Units 01/07/25  1350 01/07/25  1344   BLOOD CULTURE  Received in Microbiology Lab. Culture in Progress. Received in Microbiology Lab. Culture in Progress.       Imaging Results Review: I reviewed radiology reports from this admission including: chest xray.  Other Study Results Review: EKG was reviewed.     Last 24 Hours Medication List:     Current Facility-Administered Medications:     acetaminophen (TYLENOL) tablet 650 mg, Q6H PRN    albuterol (PROVENTIL HFA,VENTOLIN HFA) inhaler 2 puff, Q6H PRN    apixaban (ELIQUIS) tablet 5 mg, BID    aspirin chewable tablet 81 mg, Daily    atorvastatin (LIPITOR) tablet 10 mg, Daily With Dinner    cyanocobalamin (VITAMIN B-12) tablet 100 mcg, Daily    ferrous sulfate tablet 325 mg, Daily With Breakfast    fludrocortisone (FLORINEF) tablet 0.2 mg, Daily    hydrocortisone (CORTEF) tablet 15 mg, Daily **AND** [START ON 1/9/2025] hydrocortisone (CORTEF) tablet 20 mg, QAM    insulin glargine (LANTUS) subcutaneous injection 18 Units 0.18 mL, HS    insulin lispro (HumALOG/ADMELOG) 100 units/mL subcutaneous injection 1-6 Units, TID AC **AND** Fingerstick Glucose (POCT), TID AC    insulin lispro (HumALOG/ADMELOG) 100 units/mL subcutaneous injection 1-6 Units, HS    insulin lispro (HumALOG/ADMELOG) 100 units/mL subcutaneous injection 8 Units, TID With Meals    magnesium Oxide (MAG-OX) tablet 400 mg, BID    midodrine (PROAMATINE) tablet 5 mg, TID AC    sertraline (ZOLOFT) tablet 100 mg, Daily    sodium chloride 0.9 % infusion, Continuous, Last Rate: 75 mL/hr (01/07/25 1554)    Administrative Statements   Today, Patient Was Seen By: Rosmery  KATHARINE Lechuga      **Please Note: This note may have been constructed using a voice recognition system.**

## 2025-01-09 LAB
GLUCOSE SERPL-MCNC: 147 MG/DL (ref 65–140)
GLUCOSE SERPL-MCNC: 184 MG/DL (ref 65–140)
GLUCOSE SERPL-MCNC: 228 MG/DL (ref 65–140)
GLUCOSE SERPL-MCNC: 261 MG/DL (ref 65–140)
GLUCOSE SERPL-MCNC: 64 MG/DL (ref 65–140)
GLUCOSE SERPL-MCNC: 72 MG/DL (ref 65–140)
GLUCOSE SERPL-MCNC: 72 MG/DL (ref 65–140)
GLUCOSE SERPL-MCNC: 88 MG/DL (ref 65–140)

## 2025-01-09 PROCEDURE — 99232 SBSQ HOSP IP/OBS MODERATE 35: CPT | Performed by: PHYSICIAN ASSISTANT

## 2025-01-09 PROCEDURE — 82948 REAGENT STRIP/BLOOD GLUCOSE: CPT

## 2025-01-09 PROCEDURE — 97530 THERAPEUTIC ACTIVITIES: CPT

## 2025-01-09 PROCEDURE — 97112 NEUROMUSCULAR REEDUCATION: CPT

## 2025-01-09 RX ORDER — INSULIN LISPRO 100 [IU]/ML
4 INJECTION, SOLUTION INTRAVENOUS; SUBCUTANEOUS
Status: DISCONTINUED | OUTPATIENT
Start: 2025-01-10 | End: 2025-01-11 | Stop reason: HOSPADM

## 2025-01-09 RX ORDER — INSULIN GLARGINE 100 [IU]/ML
12 INJECTION, SOLUTION SUBCUTANEOUS
Status: DISCONTINUED | OUTPATIENT
Start: 2025-01-09 | End: 2025-01-11 | Stop reason: HOSPADM

## 2025-01-09 RX ADMIN — INSULIN LISPRO 2 UNITS: 100 INJECTION, SOLUTION INTRAVENOUS; SUBCUTANEOUS at 11:52

## 2025-01-09 RX ADMIN — INSULIN GLARGINE 12 UNITS: 100 INJECTION, SOLUTION SUBCUTANEOUS at 21:31

## 2025-01-09 RX ADMIN — MIDODRINE HYDROCHLORIDE 5 MG: 5 TABLET ORAL at 06:10

## 2025-01-09 RX ADMIN — HYDROCORTISONE 20 MG: 20 TABLET ORAL at 10:04

## 2025-01-09 RX ADMIN — INSULIN LISPRO 8 UNITS: 100 INJECTION, SOLUTION INTRAVENOUS; SUBCUTANEOUS at 08:25

## 2025-01-09 RX ADMIN — MIDODRINE HYDROCHLORIDE 5 MG: 5 TABLET ORAL at 16:04

## 2025-01-09 RX ADMIN — APIXABAN 5 MG: 5 TABLET, FILM COATED ORAL at 18:02

## 2025-01-09 RX ADMIN — APIXABAN 5 MG: 5 TABLET, FILM COATED ORAL at 08:19

## 2025-01-09 RX ADMIN — ASPIRIN 81 MG CHEWABLE TABLET 81 MG: 81 TABLET CHEWABLE at 08:18

## 2025-01-09 RX ADMIN — MAGNESIUM OXIDE TAB 400 MG (241.3 MG ELEMENTAL MG) 400 MG: 400 (241.3 MG) TAB at 18:02

## 2025-01-09 RX ADMIN — MIDODRINE HYDROCHLORIDE 5 MG: 5 TABLET ORAL at 11:52

## 2025-01-09 RX ADMIN — INSULIN LISPRO 8 UNITS: 100 INJECTION, SOLUTION INTRAVENOUS; SUBCUTANEOUS at 11:52

## 2025-01-09 RX ADMIN — VITAM B12 100 MCG: 100 TAB at 08:19

## 2025-01-09 RX ADMIN — ATORVASTATIN CALCIUM 10 MG: 10 TABLET, FILM COATED ORAL at 16:04

## 2025-01-09 RX ADMIN — MAGNESIUM OXIDE TAB 400 MG (241.3 MG ELEMENTAL MG) 400 MG: 400 (241.3 MG) TAB at 08:18

## 2025-01-09 RX ADMIN — HYDROCORTISONE 15 MG: 10 TABLET ORAL at 16:04

## 2025-01-09 RX ADMIN — FLUDROCORTISONE ACETATE 0.2 MG: 0.1 TABLET ORAL at 08:19

## 2025-01-09 RX ADMIN — SERTRALINE HYDROCHLORIDE 100 MG: 100 TABLET ORAL at 08:19

## 2025-01-09 RX ADMIN — INSULIN LISPRO 3 UNITS: 100 INJECTION, SOLUTION INTRAVENOUS; SUBCUTANEOUS at 21:33

## 2025-01-09 RX ADMIN — INSULIN LISPRO 1 UNITS: 100 INJECTION, SOLUTION INTRAVENOUS; SUBCUTANEOUS at 08:18

## 2025-01-09 RX ADMIN — FERROUS SULFATE TAB 325 MG (65 MG ELEMENTAL FE) 325 MG: 325 (65 FE) TAB at 08:19

## 2025-01-09 NOTE — CASE MANAGEMENT
Case Management Discharge Planning Note    Patient name Karson You  Location 2 215/CW2 215-02 MRN [id number]  : 1955 Date 2025       Current Admission Date: 2025  Current Admission Diagnosis:Fall   Patient Active Problem List    Diagnosis Date Noted Date Diagnosed    Leukocytosis 2025     Type 1 diabetes mellitus with hyperglycemia (HCC) 2024     Urinary incontinence 2024     PAD (peripheral artery disease) (HCC) 2024     Stage 2 chronic kidney disease 2024     Mixed hyperlipidemia 2024     Morbid (severe) obesity due to excess calories ([facility]) 2024     Vitamin D deficiency 2024     Chronic diastolic heart failure ([facility]) 2023     Prolonged Q-T interval on ECG 2023     Hypokalemia 2023     Hypomagnesemia 2023     Hypotension/syncope 11/10/2023     Fall 11/10/2023     Adrenal insufficiency (HCC) 11/10/2023     Orthostatic hypotension 11/10/2023     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[facility]) 11/10/2023     History of CVA (cerebrovascular accident) 11/10/2023     Paroxysmal atrial fibrillation (HCC) 11/10/2023     Spells of decreased attentiveness 2023     Cerebrovascular accident (CVA) ([facility]) 2023     Iron deficiency anemia, unspecified 2023     Syncope 2023     Unintentional weight loss 2022     Recurrent hypoglycemia 10/19/2022     Type 1 diabetes mellitus with diabetic autonomic (poly)neuropathy (HCC) 10/19/2022     Paroxysmal atrial fibrillation  10/19/2022     Anemia of chronic disease 10/19/2022     Psychiatric disorder 10/19/2022       LOS (days): 1  Geometric Mean LOS (GMLOS) (days): 3.4  Days to GMLOS:2.2     OBJECTIVE:  Risk of Unplanned Readmission Score: 18.24         Current admission status: Inpatient   Preferred Pharmacy:   [facility] - [place], [place] - [address]  [address]  [place] [address]  Phone: [phone] Fax: [phone]    [facility]/pharmacy #[id number]  - [place] - [address]  [place] [address]  Phone: [phone] Fax: [phone]    [facility] - [place], [place] - [address] [facility] [address] [facility] [place] [address]  Phone: [phone] Fax: [phone]    Primary Care Provider: NACHO Kenney    Primary Insurance: [facility]  Secondary Insurance:     DISCHARGE DETAILS:    Discharge planning discussed with:: Beth Gilbert             Additional Comments: Beth Gilbert was made aware [facility] was the only accepting facility.  Theo Denise requested a financial application and Ofelia did not want to do the application.  Ofelia said she spoke to the patient who was in agreement to [facility].  STR auth was requested via [employer] with [facility] [employer] and awaiting determination.  CM to be available    Accepting Facility Name, City & State : [facility]  Receiving Facility/Agency Phone Number: [phone]  Facility/Agency Fax Number: [phone]

## 2025-01-09 NOTE — ASSESSMENT & PLAN NOTE
Wbc 13.27 with reported increased work of breathing and congestion. Denies respiratory symptoms 1/8  Possibly steroid induced or leukemoid reaction to fall.   Follow up infectious workup including Bcx - 1 out of 2 staph epidermis (suspected contaminant), UA - negative, CXR - clear and COVID/Flu - negative  Monitor off abx

## 2025-01-09 NOTE — PHYSICAL THERAPY NOTE
Physical Therapy Progress Note     01/09/25 1020   PT Last Visit   PT Visit Date 01/09/25   Note Type   Note Type Treatment   Pain Assessment   Pain Assessment Tool 0-10   Pain Score No Pain   Restrictions/Precautions   Other Precautions Fall Risk;Cognitive;Chair Alarm;Bed Alarm  (Alarm active post session.)   Subjective   Subjective The patient reports fatigue. He notes continued dizziness with transitions, but that it does improve with time.   Bed Mobility   Supine to Sit 4  Minimal assistance   Additional items Assist x 1;Increased time required;LE management;Verbal cues   Sit to Supine 4  Minimal assistance   Additional items Assist x 1;Increased time required;Verbal cues;LE management   Transfers   Sit to Stand 4  Minimal assistance   Additional items Assist x 1;Increased time required;Verbal cues   Stand to Sit 4  Minimal assistance   Additional items Assist x 1;Increased time required;Verbal cues   Ambulation/Elevation   Gait pattern Excessively slow;Step to;Short stride;Inconsistent melva;Decreased foot clearance   Gait Assistance 3  Moderate assist   Additional items Assist x 1;Verbal cues;Tactile cues   Assistive Device Rolling walker   Distance 4 feet x 2, 5 feet x 2.   Balance   Static Sitting Fair +   Dynamic Sitting Fair   Static Standing Poor +   Dynamic Standing Poor +   Ambulatory Poor   Activity Tolerance   Activity Tolerance Patient tolerated treatment well;Patient limited by fatigue   Assessment   Prognosis Good   Problem List Decreased strength;Decreased endurance;Impaired balance;Decreased mobility;Decreased coordination;Decreased cognition;Pain;Impaired judgement;Decreased safety awareness   Assessment The patient continues to report dizziness, but this appears improved from the prior session. He had no staring spells or periods of decreased responsiveness. Ambulation was limited, however, due to his fatigue. The patient was lethargic initially, and he required approximately eight minutes to  become fully alert. Static and dynamic sitting was performed at the edge of the bed without any loss of balance. He was challenged while reaching outside of his base of support. The patient was able to stand four separate trials today for up to four minutes at a time. He will benefit from continued therapies in order to facilitate his return to baseline.   Barriers to Discharge Inaccessible home environment   Goals   Patient Goals To rest.   STG Expiration Date 01/22/25   PT Treatment Day 1   Plan   Treatment/Interventions Functional transfer training;LE strengthening/ROM;Therapeutic exercise;Endurance training;Cognitive reorientation;Patient/family training;Bed mobility;Gait training;Elevations   Progress Progressing toward goals   PT Frequency 3-5x/wk   Discharge Recommendation   Rehab Resource Intensity Level, PT II (Moderate Resource Intensity)   Equipment Recommended Walker   Walker Package Recommended Wheeled walker   AM-PAC Basic Mobility Inpatient   Turning in Flat Bed Without Bedrails 3   Lying on Back to Sitting on Edge of Flat Bed Without Bedrails 3   Moving Bed to Chair 2   Standing Up From Chair Using Arms 3   Walk in Room 2   Climb 3-5 Stairs With Railing 1   Basic Mobility Inpatient Raw Score 14   Basic Mobility Standardized Score 35.55   [facility] Highest Level Of Mobility   -[facility] Goal 4: Move to chair/commode   -HLM Achieved 6: Walk 10 steps or more         An AM-PAC Basic Mobility raw score less than 16 suggests the patient may benefit from discharge to post-acute rehab services.    Carlos Bonilla, PTA

## 2025-01-09 NOTE — PLAN OF CARE
Problem: Potential for Falls  Goal: Patient will remain free of falls  Description: INTERVENTIONS:  - Educate patient/family on patient safety including physical limitations  - Instruct patient to call for assistance with activity   - Consult OT/PT to assist with strengthening/mobility   - Keep Call bell within reach  - Keep bed low and locked with side rails adjusted as appropriate  - Keep care items and personal belongings within reach  - Initiate and maintain comfort rounds  - Make Fall Risk Sign visible to staff  - Offer Toileting every 2 Hours, in advance of need  - Initiate/Maintain alarm  - Obtain necessary fall risk management equipment:   - Apply yellow socks and bracelet for high fall risk patients  - Consider moving patient to room near nurses station  Outcome: Progressing     Problem: PAIN - ADULT  Goal: Verbalizes/displays adequate comfort level or baseline comfort level  Description: Interventions:  - Encourage patient to monitor pain and request assistance  - Assess pain using appropriate pain scale  - Administer analgesics based on type and severity of pain and evaluate response  - Implement non-pharmacological measures as appropriate and evaluate response  - Consider cultural and social influences on pain and pain management  - Notify physician/advanced practitioner if interventions unsuccessful or patient reports new pain  Outcome: Progressing     Problem: INFECTION - ADULT  Goal: Absence or prevention of progression during hospitalization  Description: INTERVENTIONS:  - Assess and monitor for signs and symptoms of infection  - Monitor lab/diagnostic results  - Monitor all insertion sites, i.e. indwelling lines, tubes, and drains  - Monitor endotracheal if appropriate and nasal secretions for changes in amount and color  - [place] appropriate cooling/warming therapies per order  - Administer medications as ordered  - Instruct and encourage patient and family to use good hand hygiene  technique  - Identify and instruct in appropriate isolation precautions for identified infection/condition  Outcome: Progressing  Goal: Absence of fever/infection during neutropenic period  Description: INTERVENTIONS:  - Monitor WBC    Outcome: Progressing     Problem: SAFETY ADULT  Goal: Patient will remain free of falls  Description: INTERVENTIONS:  - Educate patient/family on patient safety including physical limitations  - Instruct patient to call for assistance with activity   - Consult OT/PT to assist with strengthening/mobility   - Keep Call bell within reach  - Keep bed low and locked with side rails adjusted as appropriate  - Keep care items and personal belongings within reach  - Initiate and maintain comfort rounds  - Make Fall Risk Sign visible to staff  - Offer Toileting every 2 Hours, in advance of need  - Initiate/Maintain alarm  - Obtain necessary fall risk management equipment:   - Apply yellow socks and bracelet for high fall risk patients  - Consider moving patient to room near nurses station  Outcome: Progressing  Goal: Maintain or return to baseline ADL function  Description: INTERVENTIONS:  -  Assess patient's ability to carry out ADLs; assess patient's baseline for ADL function and identify physical deficits which impact ability to perform ADLs (bathing, care of mouth/teeth, toileting, grooming, dressing, etc.)  - Assess/evaluate cause of self-care deficits   - Assess range of motion  - Assess patient's mobility; develop plan if impaired  - Assess patient's need for assistive devices and provide as appropriate  - Encourage maximum independence but intervene and supervise when necessary  - Involve family in performance of ADLs  - Assess for home care needs following discharge   - Consider OT consult to assist with ADL evaluation and planning for discharge  - Provide patient education as appropriate  Outcome: Progressing  Goal: Maintains/Returns to pre admission functional level  Description:  INTERVENTIONS:  - Perform AM-PAC 6 Click Basic Mobility/ Daily Activity assessment daily.  - Set and communicate daily mobility goal to care team and patient/family/caregiver.   - Collaborate with rehabilitation services on mobility goals if consulted  - Perform Range of Motion 3 times a day.  - Reposition patient every 2 hours.  - Dangle patient 3 times a day  - Stand patient 3 times a day  - Ambulate patient 3 times a day  - Out of bed to chair 3 times a day   - Out of bed for meals 3 times a day  - Out of bed for toileting  - Record patient progress and toleration of activity level   Outcome: Progressing     Problem: DISCHARGE PLANNING  Goal: Discharge to home or other facility with appropriate resources  Description: INTERVENTIONS:  - Identify barriers to discharge w/patient and caregiver  - Arrange for needed discharge resources and transportation as appropriate  - Identify discharge learning needs (meds, wound care, etc.)  - Arrange for interpretive services to assist at discharge as needed  - Refer to Case Management Department for coordinating discharge planning if the patient needs post-hospital services based on physician/advanced practitioner order or complex needs related to functional status, cognitive ability, or social support system  Outcome: Progressing     Problem: Knowledge Deficit  Goal: Patient/family/caregiver demonstrates understanding of disease process, treatment plan, medications, and discharge instructions  Description: Complete learning assessment and assess knowledge base.  Interventions:  - Provide teaching at level of understanding  - Provide teaching via preferred learning methods  Outcome: Progressing     Problem: Prexisting or High Potential for Compromised Skin Integrity  Goal: Skin integrity is maintained or improved  Description: INTERVENTIONS:  - Identify patients at risk for skin breakdown  - Assess and monitor skin integrity  - Assess and monitor nutrition and hydration  status  - Monitor labs   - Assess for incontinence   - Turn and reposition patient  - Assist with mobility/ambulation  - Relieve pressure over bony prominences  - Avoid friction and shearing  - Provide appropriate hygiene as needed including keeping skin clean and dry  - Evaluate need for skin moisturizer/barrier cream  - Collaborate with interdisciplinary team   - Patient/family teaching  - Consider wound care consult   Outcome: Progressing

## 2025-01-09 NOTE — PROGRESS NOTES
Progress Note - Hospitalist   Name: Karson You 69 y.o. male I MRN: [id number]  Unit/Bed#: CW2 215-02 I Date of Admission: 1/7/2025   Date of Service: 1/9/2025 I Hospital Day: 1    Assessment & Plan  Fall  Pmhx of adrenal insufficiency and orthostatic hypotension who presents with 2 falls today. 1 of episode while trying to go to the bathroom.  No head strike or LOC reported. Daughter reports he was briefly unresponsive and was gasping for air.   BP low in the 92/55 in the ED despite IV hydration therefore ED requests further monitoring with medicine  Suspect  presentation secondary to orthostatic hypotension.   Orthostatics positive. Placed on scheduled midodrine 5mg TID with holding parameters ( daughter reports this is changed to prn only ). Continue Florinef and Cortef. Stocking compression and abdominal binder.   Heplock IVF. Patient eating and drinking well  Telemetry - no events  PT/OT - rehab  Adrenal insufficiency (HCC)  Maintained on hydrocortisone 20 mg a.m. and 15 mg p.m. along with Florinef 0.2 mg daily  Follows endocrinology outpatient  Increase dose to 40mg am/30mg pm awaiting ruling out infectious process/acute illness - ruled out. Restarted home doses 1/8.  1 out of 2 blood cultures positive for staph epidermis - suspect contaminant    Orthostatic hypotension  Known history with previously maintained on midodrine and salt tabs which have now since then been transition to as needed given outpatient concern for volume overload  Presenting with persistent hypotension in the setting of falls  Continue hydrocortisone as below  Midodrine 5 mg TID. Would keep as a standing dose with hold parameters as opposed to prn   Monitor orthostatic vitals - positive 1/8, but patient without symptoms  Continue compression stockings and abdominal binders when ambulating  History of CVA (cerebrovascular accident)  Continue aspirin and statin  Paroxysmal atrial fibrillation (HCC)  Rate controlled  Continue Eliquis  Not  on any beta-blocker  Hypomagnesemia  Magnesium 1.7, repleted.  Monitor  Chronic diastolic heart failure (HCC)  Wt Readings from Last 3 Encounters:   09/05/24 77.8 kg (171 lb 9.6 oz)   06/27/24 80.5 kg (177 lb 6.4 oz)   06/13/24 88.9 kg (196 lb)     12/2023 TTE EF 60%  Maintained on Lasix 20 mg daily prn.   I/Os  Type 1 diabetes mellitus with hyperglycemia (HCC)  Lab Results   Component Value Date    HGBA1C 7.0 (H) 06/13/2024       Recent Labs     01/09/25  0356 01/09/25  0457 01/09/25  0607 01/09/25  1028   POCGLU 72 147* 184* 228*       Blood Sugar Average: Last 72 hrs:  (P) 197.8454467700648504    Home regimen: Toujeo 18 units daily and lispro 8 units 3 times daily with meals  Continue home regimen with correctional scale  Diabetic diet  Hypoglycemia protocol  Leukocytosis  Wbc 13.27 with reported increased work of breathing and congestion. Denies respiratory symptoms 1/8  Possibly steroid induced or leukemoid reaction to fall.   Follow up infectious workup including Bcx - 1 out of 2 staph epidermis (suspected contaminant), UA - negative, CXR - clear and COVID/Flu - negative  Monitor off abx      VTE Pharmacologic Prophylaxis: VTE Score: 3 Moderate Risk (Score 3-4) - Pharmacological DVT Prophylaxis Ordered: apixaban (Eliquis).    Mobility:   Basic Mobility Inpatient Raw Score: 14  JH-HLM Goal: 4: Move to chair/commode  JH-HLM Achieved: 3: Sit at edge of bed  JH-HLM Goal NOT achieved. Continue with multidisciplinary rounding and encourage appropriate mobility to improve upon JH-HLM goals.    Patient Centered Rounds: I performed bedside rounds with nursing staff today.   Discussions with Specialists or Other Care Team Provider: case management    Education and Discussions with Family / Patient: Updated  (daughter) via phone.    Current Length of Stay: 1 day(s)  Current Patient Status: Inpatient   Certification Statement: The patient will continue to require additional inpatient hospital stay due to  discharge planning for STR  Discharge Plan: Anticipate discharge later today or tomorrow to rehab facility.    Code Status: Level 1 - Full Code    Subjective   Patient offers no complaints.  No events overnight per nursing.    Objective :  Temp:  [98.1 °F (36.7 °C)] 98.1 °F (36.7 °C)  HR:  [76-91] 87  BP: (126-162)/() 150/89  Resp:  [20] 20  SpO2:  [95 %-97 %] 95 %  O2 Device: None (Room air)    There is no height or weight on file to calculate BMI.     Input and Output Summary (last 24 hours):     Intake/Output Summary (Last 24 hours) at 1/9/2025 1108  Last data filed at 1/9/2025 0930  Gross per 24 hour   Intake 240 ml   Output 1300 ml   Net -1060 ml       Physical Exam  Constitutional:       General: He is not in acute distress.     Appearance: He is well-developed.   HENT:      Head: Normocephalic and atraumatic.   Cardiovascular:      Rate and Rhythm: Normal rate and regular rhythm.      Heart sounds: No murmur heard.  Pulmonary:      Effort: Pulmonary effort is normal. No respiratory distress.      Breath sounds: Normal breath sounds. No wheezing or rales.   Abdominal:      General: Bowel sounds are normal. There is no distension.      Palpations: Abdomen is soft.   Musculoskeletal:      Cervical back: Normal range of motion and neck supple.   Skin:     General: Skin is warm and dry.      Findings: No rash.   Neurological:      Mental Status: He is alert.      Cranial Nerves: No cranial nerve deficit.           Lines/Drains:  Lines/Drains/Airways       Active Status       Name Placement date Placement time Site Days    External Urinary Catheter Medium 01/09/25  0334  -- less than 1                            Lab Results: I have reviewed the following results:   Results from last 7 days   Lab Units 01/08/25  0515 01/07/25  1017   WBC Thousand/uL 12.29* 13.27*   HEMOGLOBIN g/dL 12.1 12.5   HEMATOCRIT % 37.2 39.2   PLATELETS Thousands/uL 138* 135*   SEGS PCT %  --  69   LYMPHO PCT %  --  15   MONO PCT %  --   14*   EOS PCT %  --  1     Results from last 7 days   Lab Units 01/08/25  0515 01/07/25  1017   SODIUM mmol/L 137 136   POTASSIUM mmol/L 3.9 4.3   CHLORIDE mmol/L 104 105   CO2 mmol/L 23 22   BUN mg/dL 25 22   CREATININE mg/dL 1.02 1.04   ANION GAP mmol/L 10 9   CALCIUM mg/dL 7.7* 7.8*   ALBUMIN g/dL  --  3.3*   TOTAL BILIRUBIN mg/dL  --  0.91   ALK PHOS U/L  --  78   ALT U/L  --  11   AST U/L  --  12*   GLUCOSE RANDOM mg/dL 177* 266*         Results from last 7 days   Lab Units 01/09/25  1028 01/09/25  0607 01/09/25  0457 01/09/25  0356 01/08/25  2012 01/08/25  1615 01/08/25  1122 01/08/25  0847 01/07/25  2113 01/07/25  1649 01/07/25  0950   POC GLUCOSE mg/dl 228* 184* 147* 72 234* 193* 157* 171* 145* 360* 282*         Results from last 7 days   Lab Units 01/07/25  1344   LACTIC ACID mmol/L 2.3*       Recent Cultures (last 7 days):   Results from last 7 days   Lab Units 01/07/25  1350 01/07/25  1344   BLOOD CULTURE   --  No Growth at 24 hrs.   GRAM STAIN RESULT  Gram positive cocci in clusters*  --          Last 24 Hours Medication List:     Current Facility-Administered Medications:     acetaminophen (TYLENOL) tablet 650 mg, Q6H PRN    albuterol (PROVENTIL HFA,VENTOLIN HFA) inhaler 2 puff, Q6H PRN    apixaban (ELIQUIS) tablet 5 mg, BID    aspirin chewable tablet 81 mg, Daily    atorvastatin (LIPITOR) tablet 10 mg, Daily With Dinner    cyanocobalamin (VITAMIN B-12) tablet 100 mcg, Daily    ferrous sulfate tablet 325 mg, Daily With Breakfast    fludrocortisone (FLORINEF) tablet 0.2 mg, Daily    hydrocortisone (CORTEF) tablet 15 mg, Daily **AND** hydrocortisone (CORTEF) tablet 20 mg, QAM    insulin glargine (LANTUS) subcutaneous injection 18 Units 0.18 mL, HS    insulin lispro (HumALOG/ADMELOG) 100 units/mL subcutaneous injection 1-6 Units, TID AC **AND** Fingerstick Glucose (POCT), TID AC    insulin lispro (HumALOG/ADMELOG) 100 units/mL subcutaneous injection 1-6 Units, HS    insulin lispro (HumALOG/ADMELOG) 100  units/mL subcutaneous injection 8 Units, TID With Meals    magnesium Oxide (MAG-OX) tablet 400 mg, BID    midodrine (PROAMATINE) tablet 5 mg, TID AC    sertraline (ZOLOFT) tablet 100 mg, Daily    Administrative Statements   Today, Patient Was Seen By: Rosmery Lechuga PA-C      **Please Note: This note may have been constructed using a voice recognition system.**

## 2025-01-09 NOTE — PLAN OF CARE
Problem: PHYSICAL THERAPY ADULT  Goal: Performs mobility at highest level of function for planned discharge setting.  See evaluation for individualized goals.  Description: Treatment/Interventions: Functional transfer training, LE strengthening/ROM, Therapeutic exercise, Endurance training, Patient/family training, Equipment eval/education, Gait training, Bed mobility, Elevations          See flowsheet documentation for full assessment, interventions and recommendations.  Outcome: Progressing  Note: Prognosis: Good  Problem List: Decreased strength, Decreased endurance, Impaired balance, Decreased mobility, Decreased coordination, Decreased cognition, Pain, Impaired judgement, Decreased safety awareness  Assessment: The patient continues to report dizziness, but this appears improved from the prior session. He had no staring spells or periods of decreased responsiveness. Ambulation was limited, however, due to his fatigue. The patient was lethargic initially, and he required approximately eight minutes to become fully alert. Static and dynamic sitting was performed at the edge of the bed without any loss of balance. He was challenged while reaching outside of his base of support. The patient was able to stand four separate trials today for up to four minutes at a time. He will benefit from continued therapies in order to facilitate his return to baseline.  Barriers to Discharge: Inaccessible home environment     Rehab Resource Intensity Level, PT: II (Moderate Resource Intensity)    See flowsheet documentation for full assessment.

## 2025-01-09 NOTE — PROGRESS NOTES
Patient:  DAVID KELLY    MRN:  [id number]    Aidin Request ID:  [id number]    Level of care reserved:  Skilled Nursing Facility    Partner Reserved:  [facility], [place] [address] [phone]    Clinical needs requested:    Geography searched:  10 miles around [id number]    Start of Service:    Request sent:  11:05am EST on 1/8/2025 by oJri Clarke    Partner reserved:  4:13pm EST on 1/9/2025 by Ann Puga    Choice list shared:  4:10pm EST on 1/9/2025 by Ann Puga

## 2025-01-09 NOTE — ASSESSMENT & PLAN NOTE
Maintained on hydrocortisone 20 mg a.m. and 15 mg p.m. along with Florinef 0.2 mg daily  Follows endocrinology outpatient  Increase dose to 40mg am/30mg pm awaiting ruling out infectious process/acute illness - ruled out. Restarted home doses 1/8.  1 out of 2 blood cultures positive for staph epidermis - suspect contaminant

## 2025-01-09 NOTE — ASSESSMENT & PLAN NOTE
Known history with previously maintained on midodrine and salt tabs which have now since then been transition to as needed given outpatient concern for volume overload  Presenting with persistent hypotension in the setting of falls  Continue hydrocortisone as below  Midodrine 5 mg TID. Would keep as a standing dose with hold parameters as opposed to prn   Monitor orthostatic vitals - positive 1/8, but patient without symptoms  Continue compression stockings and abdominal binders when ambulating

## 2025-01-09 NOTE — ASSESSMENT & PLAN NOTE
Lab Results   Component Value Date    HGBA1C 7.0 (H) 06/13/2024       Recent Labs     01/09/25  0356 01/09/25  0457 01/09/25  0607 01/09/25  1028   POCGLU 72 147* 184* 228*       Blood Sugar Average: Last 72 hrs:  (P) 197.1813397060113075    Home regimen: Toujeo 18 units daily and lispro 8 units 3 times daily with meals  Continue home regimen with correctional scale  Diabetic diet  Hypoglycemia protocol

## 2025-01-09 NOTE — NURSING NOTE
Pt blood glucose check 64. Pt asymptomatic and at baseline orientation. 8oz orange juice given and pt resting comfortably with dinner tray en route. RN will recheck blood glucose level in 15 minutes.

## 2025-01-10 DIAGNOSIS — E27.40 ADRENAL INSUFFICIENCY (HCC): ICD-10-CM

## 2025-01-10 LAB
BACTERIA BLD CULT: ABNORMAL
GLUCOSE SERPL-MCNC: 151 MG/DL (ref 65–140)
GLUCOSE SERPL-MCNC: 174 MG/DL (ref 65–140)
GLUCOSE SERPL-MCNC: 212 MG/DL (ref 65–140)
GLUCOSE SERPL-MCNC: 247 MG/DL (ref 65–140)
GRAM STN SPEC: ABNORMAL
S EPIDERMIDIS DNA BLD POS QL NAA+NON-PRB: DETECTED

## 2025-01-10 PROCEDURE — 82948 REAGENT STRIP/BLOOD GLUCOSE: CPT

## 2025-01-10 PROCEDURE — 99232 SBSQ HOSP IP/OBS MODERATE 35: CPT | Performed by: NURSE PRACTITIONER

## 2025-01-10 RX ADMIN — HYDROCORTISONE 15 MG: 10 TABLET ORAL at 16:18

## 2025-01-10 RX ADMIN — INSULIN LISPRO 2 UNITS: 100 INJECTION, SOLUTION INTRAVENOUS; SUBCUTANEOUS at 10:00

## 2025-01-10 RX ADMIN — FLUDROCORTISONE ACETATE 0.2 MG: 0.1 TABLET ORAL at 09:59

## 2025-01-10 RX ADMIN — INSULIN LISPRO 1 UNITS: 100 INJECTION, SOLUTION INTRAVENOUS; SUBCUTANEOUS at 17:11

## 2025-01-10 RX ADMIN — HYDROCORTISONE 20 MG: 20 TABLET ORAL at 09:59

## 2025-01-10 RX ADMIN — INSULIN LISPRO 4 UNITS: 100 INJECTION, SOLUTION INTRAVENOUS; SUBCUTANEOUS at 12:01

## 2025-01-10 RX ADMIN — INSULIN LISPRO 4 UNITS: 100 INJECTION, SOLUTION INTRAVENOUS; SUBCUTANEOUS at 17:11

## 2025-01-10 RX ADMIN — INSULIN LISPRO 1 UNITS: 100 INJECTION, SOLUTION INTRAVENOUS; SUBCUTANEOUS at 22:13

## 2025-01-10 RX ADMIN — MIDODRINE HYDROCHLORIDE 5 MG: 5 TABLET ORAL at 12:01

## 2025-01-10 RX ADMIN — APIXABAN 5 MG: 5 TABLET, FILM COATED ORAL at 17:10

## 2025-01-10 RX ADMIN — FERROUS SULFATE TAB 325 MG (65 MG ELEMENTAL FE) 325 MG: 325 (65 FE) TAB at 09:59

## 2025-01-10 RX ADMIN — MAGNESIUM OXIDE TAB 400 MG (241.3 MG ELEMENTAL MG) 400 MG: 400 (241.3 MG) TAB at 17:10

## 2025-01-10 RX ADMIN — INSULIN LISPRO 4 UNITS: 100 INJECTION, SOLUTION INTRAVENOUS; SUBCUTANEOUS at 10:00

## 2025-01-10 RX ADMIN — INSULIN GLARGINE 12 UNITS: 100 INJECTION, SOLUTION SUBCUTANEOUS at 22:13

## 2025-01-10 RX ADMIN — ATORVASTATIN CALCIUM 10 MG: 10 TABLET, FILM COATED ORAL at 16:18

## 2025-01-10 RX ADMIN — SERTRALINE HYDROCHLORIDE 100 MG: 100 TABLET ORAL at 09:59

## 2025-01-10 RX ADMIN — ASPIRIN 81 MG CHEWABLE TABLET 81 MG: 81 TABLET CHEWABLE at 09:59

## 2025-01-10 RX ADMIN — APIXABAN 5 MG: 5 TABLET, FILM COATED ORAL at 09:59

## 2025-01-10 RX ADMIN — INSULIN LISPRO 3 UNITS: 100 INJECTION, SOLUTION INTRAVENOUS; SUBCUTANEOUS at 12:01

## 2025-01-10 RX ADMIN — VITAM B12 100 MCG: 100 TAB at 09:59

## 2025-01-10 RX ADMIN — MAGNESIUM OXIDE TAB 400 MG (241.3 MG ELEMENTAL MG) 400 MG: 400 (241.3 MG) TAB at 09:59

## 2025-01-10 NOTE — PLAN OF CARE
Problem: Potential for Falls  Goal: Patient will remain free of falls  Description: INTERVENTIONS:  - Educate patient/family on patient safety including physical limitations  - Instruct patient to call for assistance with activity   - Consult OT/PT to assist with strengthening/mobility   - Keep Call bell within reach  - Keep bed low and locked with side rails adjusted as appropriate  - Keep care items and personal belongings within reach  - Initiate and maintain comfort rounds  - Make Fall Risk Sign visible to staff  - Offer Toileting every 1 Hours, in advance of need  - Initiate/Maintain alarm  - Obtain necessary fall risk management equipment  - Apply yellow socks and bracelet for high fall risk patients  - Consider moving patient to room near nurses station  Outcome: Progressing     Problem: PAIN - ADULT  Goal: Verbalizes/displays adequate comfort level or baseline comfort level  Description: Interventions:  - Encourage patient to monitor pain and request assistance  - Assess pain using appropriate pain scale  - Administer analgesics based on type and severity of pain and evaluate response  - Implement non-pharmacological measures as appropriate and evaluate response  - Consider cultural and social influences on pain and pain management  - Notify physician/advanced practitioner if interventions unsuccessful or patient reports new pain  Outcome: Progressing     Problem: INFECTION - ADULT  Goal: Absence or prevention of progression during hospitalization  Description: INTERVENTIONS:  - Assess and monitor for signs and symptoms of infection  - Monitor lab/diagnostic results  - Monitor all insertion sites, i.e. indwelling lines, tubes, and drains  - Monitor endotracheal if appropriate and nasal secretions for changes in amount and color  - [place] appropriate cooling/warming therapies per order  - Administer medications as ordered  - Instruct and encourage patient and family to use good hand hygiene technique  -  Identify and instruct in appropriate isolation precautions for identified infection/condition  Outcome: Progressing  Goal: Absence of fever/infection during neutropenic period  Description: INTERVENTIONS:  - Monitor WBC    Outcome: Progressing     Problem: SAFETY ADULT  Goal: Patient will remain free of falls  Description: INTERVENTIONS:  - Educate patient/family on patient safety including physical limitations  - Instruct patient to call for assistance with activity   - Consult OT/PT to assist with strengthening/mobility   - Keep Call bell within reach  - Keep bed low and locked with side rails adjusted as appropriate  - Keep care items and personal belongings within reach  - Initiate and maintain comfort rounds  - Make Fall Risk Sign visible to staff  - Offer Toileting every 1 Hours, in advance of need  - Initiate/Maintain alarm  - Obtain necessary fall risk management equipment  - Apply yellow socks and bracelet for high fall risk patients  - Consider moving patient to room near nurses station  Outcome: Progressing  Goal: Maintain or return to baseline ADL function  Description: INTERVENTIONS:  -  Assess patient's ability to carry out ADLs; assess patient's baseline for ADL function and identify physical deficits which impact ability to perform ADLs (bathing, care of mouth/teeth, toileting, grooming, dressing, etc.)  - Assess/evaluate cause of self-care deficits   - Assess range of motion  - Assess patient's mobility; develop plan if impaired  - Assess patient's need for assistive devices and provide as appropriate  - Encourage maximum independence but intervene and supervise when necessary  - Involve family in performance of ADLs  - Assess for home care needs following discharge   - Consider OT consult to assist with ADL evaluation and planning for discharge  - Provide patient education as appropriate  Outcome: Progressing  Goal: Maintains/Returns to pre admission functional level  Description: INTERVENTIONS:  -  Perform AM-PAC 6 Click Basic Mobility/ Daily Activity assessment daily.  - Set and communicate daily mobility goal to care team and patient/family/caregiver.   - Collaborate with rehabilitation services on mobility goals if consulted  - Perform Range of Motion 3 times a day.  - Reposition patient every 2 hours.  - Dangle patient 3 times a day  - Stand patient 3 times a day  - Ambulate patient 3 times a day  - Out of bed to chair 3 times a day   - Out of bed for meals 3 times a day  - Out of bed for toileting  - Record patient progress and toleration of activity level   Outcome: Progressing     Problem: DISCHARGE PLANNING  Goal: Discharge to home or other facility with appropriate resources  Description: INTERVENTIONS:  - Identify barriers to discharge w/patient and caregiver  - Arrange for needed discharge resources and transportation as appropriate  - Identify discharge learning needs (meds, wound care, etc.)  - Arrange for interpretive services to assist at discharge as needed  - Refer to Case Management Department for coordinating discharge planning if the patient needs post-hospital services based on physician/advanced practitioner order or complex needs related to functional status, cognitive ability, or social support system  Outcome: Progressing     Problem: Knowledge Deficit  Goal: Patient/family/caregiver demonstrates understanding of disease process, treatment plan, medications, and discharge instructions  Description: Complete learning assessment and assess knowledge base.  Interventions:  - Provide teaching at level of understanding  - Provide teaching via preferred learning methods  Outcome: Progressing     Problem: Prexisting or High Potential for Compromised Skin Integrity  Goal: Skin integrity is maintained or improved  Description: INTERVENTIONS:  - Identify patients at risk for skin breakdown  - Assess and monitor skin integrity  - Assess and monitor nutrition and hydration status  - Monitor labs    - Assess for incontinence   - Turn and reposition patient  - Assist with mobility/ambulation  - Relieve pressure over bony prominences  - Avoid friction and shearing  - Provide appropriate hygiene as needed including keeping skin clean and dry  - Evaluate need for skin moisturizer/barrier cream  - Collaborate with interdisciplinary team   - Patient/family teaching  - Consider wound care consult   Outcome: Progressing

## 2025-01-10 NOTE — ASSESSMENT & PLAN NOTE
Possibly steroid induced or leukemoid reaction to fall.   Follow up infectious workup including Bcx - 1 out of 2 staph epidermis (suspected contaminant), UA - negative, CXR - clear and COVID/Flu - negative  Monitor off abx

## 2025-01-10 NOTE — CASE MANAGEMENT
Case Management Discharge Planning Note    Patient name Karson You  Location 2 215/CW2 215-02 MRN [id number]  : 1955 Date 1/10/2025       Current Admission Date: 2025  Current Admission Diagnosis:Fall   Patient Active Problem List    Diagnosis Date Noted Date Diagnosed    Leukocytosis 2025     Type 1 diabetes mellitus with hyperglycemia (HCC) 2024     Urinary incontinence 2024     PAD (peripheral artery disease) (HCC) 2024     Stage 2 chronic kidney disease 2024     Mixed hyperlipidemia 2024     Morbid (severe) obesity due to excess calories ([facility]) 2024     Vitamin D deficiency 2024     Chronic diastolic heart failure ([facility]) 2023     Prolonged Q-T interval on ECG 2023     Hypokalemia 2023     Hypomagnesemia 2023     Hypotension/syncope 11/10/2023     Fall 11/10/2023     Adrenal insufficiency (HCC) 11/10/2023     Orthostatic hypotension 11/10/2023     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/10/2023     History of CVA (cerebrovascular accident) 11/10/2023     Paroxysmal atrial fibrillation (HCC) 11/10/2023     Spells of decreased attentiveness 2023     Cerebrovascular accident (CVA) ([facility]) 2023     Iron deficiency anemia, unspecified 2023     Syncope 2023     Unintentional weight loss 2022     Recurrent hypoglycemia 10/19/2022     Type 1 diabetes mellitus with diabetic autonomic (poly)neuropathy (HCC) 10/19/2022     Paroxysmal atrial fibrillation  10/19/2022     Anemia of chronic disease 10/19/2022     Psychiatric disorder 10/19/2022       LOS (days): 2  Geometric Mean LOS (GMLOS) (days): 3.4  Days to GMLOS:1.2     OBJECTIVE:  Risk of Unplanned Readmission Score: 18.5         Current admission status: Inpatient   Preferred Pharmacy:   [facility] - [place] - [address]  [address]  [place] [address]  Phone: [phone] Fax: [phone]    [facility] #[id number]  - [place] - [address] [place] [address]  [place] [address]  Phone: [phone] Fax: [phone]    [facility] - [place] - [address] [facility] [place] [address] [facility] [place] [address]  Phone: [phone] Fax: [phone]    [id number] - [place] - [address] [place] [address]  [place] [address]  Phone: [phone] Fax: [phone]    Primary Care Provider: NACHO Kenney    [facility]  Secondary Insurance:     DISCHARGE DETAILS:                                                                                                               Facility Insurance Auth Number: [id number]

## 2025-01-10 NOTE — CASE MANAGEMENT
Case Management Discharge Planning Note    Patient name Karson You  Location 2 215/CW2 215-02 MRN [id number]  : 1955 Date 1/10/2025       Current Admission Date: 2025  Current Admission Diagnosis:Fall   Patient Active Problem List    Diagnosis Date Noted Date Diagnosed    Leukocytosis 2025     Type 1 diabetes mellitus with hyperglycemia (HCC) 2024     Urinary incontinence 2024     PAD (peripheral artery disease) (HCC) 2024     Stage 2 chronic kidney disease 2024     Mixed hyperlipidemia 2024     Morbid (severe) obesity due to excess calories ([facility]) 2024     Vitamin D deficiency 2024     Chronic diastolic heart failure ([facility]) 2023     Prolonged Q-T interval on ECG 2023     Hypokalemia 2023     Hypomagnesemia 2023     Hypotension/syncope 11/10/2023     Fall 11/10/2023     Adrenal insufficiency (HCC) 11/10/2023     Orthostatic hypotension 11/10/2023     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/2023     History of CVA (cerebrovascular accident) 11/10/2023     Paroxysmal atrial fibrillation (HCC) 11/10/2023     Spells of decreased attentiveness 2023     Cerebrovascular accident (CVA) ([facility]) 2023     Iron deficiency anemia, unspecified 2023     Syncope 2023     Unintentional weight loss 2022     Recurrent hypoglycemia 10/19/2022     Type 1 diabetes mellitus with diabetic autonomic (poly)neuropathy (HCC) 10/19/2022     Paroxysmal atrial fibrillation  10/19/2022     Anemia of chronic disease 10/19/2022     Psychiatric disorder 10/19/2022       LOS (days): 2  Geometric Mean LOS (GMLOS) (days): 3.4  Days to GMLOS:1.2     OBJECTIVE:  Risk of Unplanned Readmission Score: 18.5         Current admission status: Inpatient   Preferred Pharmacy:   [facility] - [place] - [address]  [address]  [place] [address]  Phone: [phone] Fax: [phone]    [id number]  - [place] - [address]  [address]  [place] [address]  Phone: [phone] Fax: [phone]    [facility] - [place] - [address]  [address] [facility] [place] [address]  Phone: [phone] Fax: [phone]    [id number] - [place] - [address]  [address]  [place] [address]  Phone: [phone] Fax: [phone]    Primary Care Provider: NACHO Kenney    Primary Insurance: [facility] REP  Secondary Insurance:     DISCHARGE DETAILS:        Additional Comments: Phone all to Jia Raymond pt daughter  Discussed DCP. Jia asked about possibility of transfer to [facility]. ALEXANDR explained auth has been submitted for [facility], if when he gets there she can certainly explore a transfer however to keep him here to complete that process would add more time and there is no guarentee. Jia stated she understood and will look at a possible transfer later on. Jia had some confusion about being medically cleared. ALXEANDR told her that when a patient is medically cleared that is for the reccomendations in this case going to rehab. Jia does have caregivers in the home. Jia continues to be concerned about the low BP pt has experienced and doesn't know why he is cleared for DC with the low BP. CM will pass along concerns

## 2025-01-10 NOTE — ASSESSMENT & PLAN NOTE
Known history with previously maintained on midodrine and salt tabs which have now since then been transition to as needed given outpatient concern for volume overload  Presenting with persistent hypotension in the setting of falls  Continue hydrocortisone as below  Midodrine 5 mg TID. Would keep as a standing dose with hold parameters as opposed to prn   Monitor orthostatic vitals - positive   Continue compression stockings and abdominal binders when ambulating

## 2025-01-10 NOTE — ASSESSMENT & PLAN NOTE
Pmhx of adrenal insufficiency and orthostatic hypotension who presented with 2 falls. 1 episode while trying to go to the bathroom.  No head strike or LOC reported. Daughter reports he was briefly unresponsive and was gasping for air.   BP low 92/55 in the ED despite IV hydration    Suspect  presentation secondary to orthostatic hypotension.   Orthostatics positive. Placed on scheduled midodrine 5mg TID with holding parameters ( daughter reports this is changed to prn only ). Continue Florinef and Cortef. Stocking compression and abdominal binder.   Heplocked IVF. Patient eating and drinking well  Telemetry - no events  PT/OT - rehab, pending placement

## 2025-01-10 NOTE — PROGRESS NOTES
Progress Note - Hospitalist   Name: Karson You 69 y.o. male I MRN: [id number]  Unit/Bed#: CW2 215-02 I Date of Admission: 1/7/2025   Date of Service: 1/10/2025 I Hospital Day: 2    Assessment & Plan  Fall  Pmhx of adrenal insufficiency and orthostatic hypotension who presented with 2 falls. 1 episode while trying to go to the bathroom.  No head strike or LOC reported. Daughter reports he was briefly unresponsive and was gasping for air.   BP low 92/55 in the ED despite IV hydration    Suspect  presentation secondary to orthostatic hypotension.   Orthostatics positive. Placed on scheduled midodrine 5mg TID with holding parameters ( daughter reports this is changed to prn only ). Continue Florinef and Cortef. Stocking compression and abdominal binder.   Heplocked IVF. Patient eating and drinking well  Telemetry - no events  PT/OT - rehab, pending placement   Adrenal insufficiency (HCC)  Maintained on hydrocortisone 20 mg a.m. and 15 mg p.m. along with Florinef 0.2 mg daily  Follows endocrinology outpatient  Increase dose to 40mg am/30mg pm awaiting ruling out infectious process/acute illness - ruled out. Restarted home doses 1/8.  1 out of 2 blood cultures positive for staph epidermis - suspect contaminant    Orthostatic hypotension  Known history with previously maintained on midodrine and salt tabs which have now since then been transition to as needed given outpatient concern for volume overload  Presenting with persistent hypotension in the setting of falls  Continue hydrocortisone as below  Midodrine 5 mg TID. Would keep as a standing dose with hold parameters as opposed to prn   Monitor orthostatic vitals - positive   Continue compression stockings and abdominal binders when ambulating  History of CVA (cerebrovascular accident)  Continue aspirin and statin  Paroxysmal atrial fibrillation (HCC)  Rate controlled  Continue Eliquis  Not on any beta-blocker  Hypomagnesemia  Magnesium 1.7,  repleted.  Monitor  Chronic diastolic heart failure (HCC)  Wt Readings from Last 3 Encounters:   09/05/24 77.8 kg (171 lb 9.6 oz)   06/27/24 80.5 kg (177 lb 6.4 oz)   06/13/24 88.9 kg (196 lb)     12/2023 TTE EF 60%  Maintained on Lasix 20 mg daily prn.   I/Os  Type 1 diabetes mellitus with hyperglycemia (HCC)  Lab Results   Component Value Date    HGBA1C 7.0 (H) 06/13/2024       Recent Labs     01/09/25  1817 01/09/25 2037 01/10/25  0725 01/10/25  1122   POCGLU 88 261* 212* 247*       Blood Sugar Average: Last 72 hrs:  (P) 183.7791646797286123    Home regimen: Toujeo 18 units daily and lispro 8 units 3 times daily with meals  Continue home regimen with correctional scale  Diabetic diet  Hypoglycemia protocol  Leukocytosis  Possibly steroid induced or leukemoid reaction to fall.   Follow up infectious workup including Bcx - 1 out of 2 staph epidermis (suspected contaminant), UA - negative, CXR - clear and COVID/Flu - negative  Monitor off abx      VTE Pharmacologic Prophylaxis: VTE Score: 3 Moderate Risk (Score 3-4) - Pharmacological DVT Prophylaxis Ordered: apixaban (Eliquis).    Mobility:   Basic Mobility Inpatient Raw Score: 14  -HLM Goal: 4: Move to chair/commode  JH-HLM Achieved: 2: Bed activities/Dependent transfer  JH-HLM Goal NOT achieved. Continue with multidisciplinary rounding and encourage appropriate mobility to improve upon JH-HLM goals.    Patient Centered Rounds: I performed bedside rounds with nursing staff today.   Discussions with Specialists or Other Care Team Provider: d/w RN and CM     Education and Discussions with Family / Patient: Updated  (daughter) via phone.    Current Length of Stay: 2 day(s)  Current Patient Status: Inpatient   Certification Statement: The patient will continue to require additional inpatient hospital stay due to pending placement   Discharge Plan: Anticipate discharge later today or tomorrow to rehab facility.    Code Status: Level 1 - Full  Code    Subjective   Pt lying in bed. Denies any complaints. No events per RN.     Objective :  Temp:  [98.3 °F (36.8 °C)-98.6 °F (37 °C)] 98.3 °F (36.8 °C)  HR:  [66-98] 66  BP: ()/(52-82) 90/52  Resp:  [16-17] 16  SpO2:  [93 %-100 %] 100 %  O2 Device: None (Room air)    There is no height or weight on file to calculate BMI.     Input and Output Summary (last 24 hours):     Intake/Output Summary (Last 24 hours) at 1/10/2025 1439  Last data filed at 1/10/2025 1254  Gross per 24 hour   Intake 1418 ml   Output 1000 ml   Net 418 ml       Physical Exam  Constitutional:       General: He is not in acute distress.     Comments: Chronically ill appearing    Cardiovascular:      Rate and Rhythm: Normal rate and regular rhythm.      Pulses: Normal pulses.      Heart sounds: Normal heart sounds. No murmur heard.  Pulmonary:      Effort: No respiratory distress.      Breath sounds: Normal breath sounds. No wheezing or rales.   Abdominal:      General: Bowel sounds are normal. There is no distension.      Palpations: Abdomen is soft.      Tenderness: There is no abdominal tenderness.   Musculoskeletal:         General: No swelling or tenderness.   Skin:     General: Skin is warm and dry.      Findings: No erythema or rash.   Neurological:      General: No focal deficit present.      Mental Status: He is alert. Mental status is at baseline.   Psychiatric:         Attention and Perception: Attention normal.         Mood and Affect: Affect is flat.           Lines/Drains:  Lines/Drains/Airways       Active Status       Name Placement date Placement time Site Days    External Urinary Catheter Medium 01/09/25  0334  -- 1                            Lab Results: I have reviewed the following results:   Results from last 7 days   Lab Units 01/08/25  0515 01/07/25  1017   WBC Thousand/uL 12.29* 13.27*   HEMOGLOBIN g/dL 12.1 12.5   HEMATOCRIT % 37.2 39.2   PLATELETS Thousands/uL 138* 135*   SEGS PCT %  --  69   LYMPHO PCT %  --  15    MONO PCT %  --  14*   EOS PCT %  --  1     Results from last 7 days   Lab Units 01/08/25  0515 01/07/25  1017   SODIUM mmol/L 137 136   POTASSIUM mmol/L 3.9 4.3   CHLORIDE mmol/L 104 105   CO2 mmol/L 23 22   BUN mg/dL 25 22   CREATININE mg/dL 1.02 1.04   ANION GAP mmol/L 10 9   CALCIUM mg/dL 7.7* 7.8*   ALBUMIN g/dL  --  3.3*   TOTAL BILIRUBIN mg/dL  --  0.91   ALK PHOS U/L  --  78   ALT U/L  --  11   AST U/L  --  12*   GLUCOSE RANDOM mg/dL 177* 266*         Results from last 7 days   Lab Units 01/10/25  1122 01/10/25  0725 01/09/25  2037 01/09/25  1817 01/09/25  1743 01/09/25  1706 01/09/25  1028 01/09/25  0607 01/09/25  0457 01/09/25  0356 01/08/25  2012 01/08/25  1615   POC GLUCOSE mg/dl 247* 212* 261* 88 72 64* 228* 184* 147* 72 234* 193*         Results from last 7 days   Lab Units 01/07/25  1344   LACTIC ACID mmol/L 2.3*       Recent Cultures (last 7 days):   Results from last 7 days   Lab Units 01/07/25  1350 01/07/25  1344   BLOOD CULTURE  Staphylococcus epidermidis* No Growth at 48 hrs.   GRAM STAIN RESULT  Gram positive cocci in clusters*  --        Imaging Results Review: I reviewed radiology reports from this admission including: chest xray.  Other Study Results Review: No additional pertinent studies reviewed.    Last 24 Hours Medication List:     Current Facility-Administered Medications:     acetaminophen (TYLENOL) tablet 650 mg, Q6H PRN    albuterol (PROVENTIL HFA,VENTOLIN HFA) inhaler 2 puff, Q6H PRN    apixaban (ELIQUIS) tablet 5 mg, BID    aspirin chewable tablet 81 mg, Daily    atorvastatin (LIPITOR) tablet 10 mg, Daily With Dinner    cyanocobalamin (VITAMIN B-12) tablet 100 mcg, Daily    ferrous sulfate tablet 325 mg, Daily With Breakfast    fludrocortisone (FLORINEF) tablet 0.2 mg, Daily    hydrocortisone (CORTEF) tablet 15 mg, Daily **AND** hydrocortisone (CORTEF) tablet 20 mg, QAM    insulin glargine (LANTUS) subcutaneous injection 12 Units 0.12 mL, HS    insulin lispro (HumALOG/ADMELOG)  100 units/mL subcutaneous injection 1-6 Units, TID AC **AND** Fingerstick Glucose (POCT), TID AC    insulin lispro (HumALOG/ADMELOG) 100 units/mL subcutaneous injection 1-6 Units, HS    insulin lispro (HumALOG/ADMELOG) 100 units/mL subcutaneous injection 4 Units, TID With Meals    magnesium Oxide (MAG-OX) tablet 400 mg, BID    midodrine (PROAMATINE) tablet 5 mg, TID AC    sertraline (ZOLOFT) tablet 100 mg, Daily    Administrative Statements   Today, Patient Was Seen By: NACHO Bolton      **Please Note: This note may have been constructed using a voice recognition system.**

## 2025-01-10 NOTE — CASE MANAGEMENT
[facility] has received APPROVED authorization.  Insurance:   [employer]   Auth obtained via portal.  Authorization received for: SNF  [facility]   Authorization #: [id number]   Start of Care: 1/10  Next Review Date: 1/14  Continued Stay Care Coordinator: none given  Submit next review to: H & CC Portal or F: [phone]     Care Manager notified: Evie Whitney    Please reach out to CM for updates on any clinical information.

## 2025-01-10 NOTE — ASSESSMENT & PLAN NOTE
Lab Results   Component Value Date    HGBA1C 7.0 (H) 06/13/2024       Recent Labs     01/09/25  1817 01/09/25  2037 01/10/25  0725 01/10/25  1122   POCGLU 88 261* 212* 247*       Blood Sugar Average: Last 72 hrs:  (P) 183.4981010531313820    Home regimen: Toujeo 18 units daily and lispro 8 units 3 times daily with meals  Continue home regimen with correctional scale  Diabetic diet  Hypoglycemia protocol

## 2025-01-10 NOTE — CASE MANAGEMENT
[facility] received request for authorization from Care Manager.  Authorization request submitted for: SNF  [facility]  NPI: [id number]  Facility MD: Oniel Lora  NPI: [id number]  Authorization initiated by contacting insurance:  [employer]  Via: H&CC Portal   Clinicals submitted via Portal attachment   Pending Reference #: [id number]     Care Manager notified: Evie Whitney    Updates to authorization status will be noted in chart. Please reach out to CM for updates on any clinical information.

## 2025-01-11 VITALS
RESPIRATION RATE: 20 BRPM | DIASTOLIC BLOOD PRESSURE: 80 MMHG | OXYGEN SATURATION: 96 % | TEMPERATURE: 97.5 F | HEART RATE: 89 BPM | SYSTOLIC BLOOD PRESSURE: 137 MMHG

## 2025-01-11 LAB
ANION GAP SERPL CALCULATED.3IONS-SCNC: 6 MMOL/L (ref 4–13)
BUN SERPL-MCNC: 16 MG/DL (ref 5–25)
CALCIUM SERPL-MCNC: 8.4 MG/DL (ref 8.4–10.2)
CHLORIDE SERPL-SCNC: 103 MMOL/L (ref 96–108)
CO2 SERPL-SCNC: 32 MMOL/L (ref 21–32)
CREAT SERPL-MCNC: 0.92 MG/DL (ref 0.6–1.3)
ERYTHROCYTE [DISTWIDTH] IN BLOOD BY AUTOMATED COUNT: 14.2 % (ref 11.6–15.1)
GFR SERPL CREATININE-BSD FRML MDRD: 85 ML/MIN/1.73SQ M
GLUCOSE SERPL-MCNC: 78 MG/DL (ref 65–140)
GLUCOSE SERPL-MCNC: 84 MG/DL (ref 65–140)
GLUCOSE SERPL-MCNC: 90 MG/DL (ref 65–140)
HCT VFR BLD AUTO: 37.3 % (ref 36.5–49.3)
HGB BLD-MCNC: 12.3 G/DL (ref 12–17)
MAGNESIUM SERPL-MCNC: 1.8 MG/DL (ref 1.9–2.7)
MCH RBC QN AUTO: 31.9 PG (ref 26.8–34.3)
MCHC RBC AUTO-ENTMCNC: 33 G/DL (ref 31.4–37.4)
MCV RBC AUTO: 97 FL (ref 82–98)
PLATELET # BLD AUTO: 200 THOUSANDS/UL (ref 149–390)
PMV BLD AUTO: 10.4 FL (ref 8.9–12.7)
POTASSIUM SERPL-SCNC: 3.2 MMOL/L (ref 3.5–5.3)
RBC # BLD AUTO: 3.86 MILLION/UL (ref 3.88–5.62)
SODIUM SERPL-SCNC: 141 MMOL/L (ref 135–147)
WBC # BLD AUTO: 11.01 THOUSAND/UL (ref 4.31–10.16)

## 2025-01-11 PROCEDURE — 99239 HOSP IP/OBS DSCHRG MGMT >30: CPT | Performed by: NURSE PRACTITIONER

## 2025-01-11 PROCEDURE — 83735 ASSAY OF MAGNESIUM: CPT | Performed by: NURSE PRACTITIONER

## 2025-01-11 PROCEDURE — 80048 BASIC METABOLIC PNL TOTAL CA: CPT | Performed by: NURSE PRACTITIONER

## 2025-01-11 PROCEDURE — 82948 REAGENT STRIP/BLOOD GLUCOSE: CPT

## 2025-01-11 PROCEDURE — 85027 COMPLETE CBC AUTOMATED: CPT | Performed by: NURSE PRACTITIONER

## 2025-01-11 RX ORDER — INSULIN GLARGINE 100 [IU]/ML
10 INJECTION, SOLUTION SUBCUTANEOUS
Start: 2025-01-11 | End: 2025-01-18

## 2025-01-11 RX ORDER — POTASSIUM CHLORIDE 1500 MG/1
40 TABLET, EXTENDED RELEASE ORAL ONCE
Status: COMPLETED | OUTPATIENT
Start: 2025-01-11 | End: 2025-01-11

## 2025-01-11 RX ORDER — FUROSEMIDE 20 MG/1
20 TABLET ORAL AS NEEDED
Start: 2025-01-11 | End: 2025-01-21

## 2025-01-11 RX ORDER — INSULIN LISPRO 100 [IU]/ML
1-6 INJECTION, SOLUTION INTRAVENOUS; SUBCUTANEOUS
Start: 2025-01-11 | End: 2025-01-21

## 2025-01-11 RX ORDER — MIDODRINE HYDROCHLORIDE 5 MG/1
5 TABLET ORAL
Start: 2025-01-11

## 2025-01-11 RX ORDER — INSULIN LISPRO 100 [IU]/ML
2 INJECTION, SOLUTION INTRAVENOUS; SUBCUTANEOUS
Start: 2025-01-11 | End: 2025-01-18

## 2025-01-11 RX ADMIN — HYDROCORTISONE 20 MG: 20 TABLET ORAL at 08:35

## 2025-01-11 RX ADMIN — INSULIN LISPRO 4 UNITS: 100 INJECTION, SOLUTION INTRAVENOUS; SUBCUTANEOUS at 08:35

## 2025-01-11 RX ADMIN — FERROUS SULFATE TAB 325 MG (65 MG ELEMENTAL FE) 325 MG: 325 (65 FE) TAB at 08:33

## 2025-01-11 RX ADMIN — MAGNESIUM OXIDE TAB 400 MG (241.3 MG ELEMENTAL MG) 400 MG: 400 (241.3 MG) TAB at 08:34

## 2025-01-11 RX ADMIN — ASPIRIN 81 MG CHEWABLE TABLET 81 MG: 81 TABLET CHEWABLE at 08:34

## 2025-01-11 RX ADMIN — POTASSIUM CHLORIDE 40 MEQ: 1500 TABLET, EXTENDED RELEASE ORAL at 09:58

## 2025-01-11 RX ADMIN — MIDODRINE HYDROCHLORIDE 5 MG: 5 TABLET ORAL at 08:33

## 2025-01-11 RX ADMIN — APIXABAN 5 MG: 5 TABLET, FILM COATED ORAL at 08:33

## 2025-01-11 RX ADMIN — FLUDROCORTISONE ACETATE 0.2 MG: 0.1 TABLET ORAL at 08:34

## 2025-01-11 RX ADMIN — VITAM B12 100 MCG: 100 TAB at 08:34

## 2025-01-11 RX ADMIN — SERTRALINE HYDROCHLORIDE 100 MG: 100 TABLET ORAL at 08:40

## 2025-01-11 NOTE — ASSESSMENT & PLAN NOTE
Pmhx of adrenal insufficiency and orthostatic hypotension who presented with 2 falls. 1 episode while trying to go to the bathroom.  No head strike or LOC reported. Daughter reports he was briefly unresponsive and was gasping for air.   BP low 92/55 in the ED despite IV hydration    Suspect  presentation secondary to orthostatic hypotension.   Orthostatics positive. Placed on scheduled midodrine 5mg TID with holding parameters ( daughter reports this is changed to prn only ). Continue Florinef and Cortef. Stocking compression and abdominal binder.   Would commend strict compliance with compression stockings and abdominal binder while ambulating at rehab.  Telemetry - no events  PT/OT - rehab, pending placement

## 2025-01-11 NOTE — NURSING NOTE
Telephone report given to facility. Family notified by MANJEET (NACHO Lewis). Pt. Transported via EMS.

## 2025-01-11 NOTE — CASE MANAGEMENT
Case Management Progress Note    Patient name Karson You  Location CW2 215/CW2 215-02 MRN [id number]  : 1955 Date 2025       LOS (days): 3  Geometric Mean LOS (GMLOS) (days): 3.4  Days to GMLOS:0.5        MANJEET Hernandez reported to this CM that when she was informing the pt's daughter fOelia Raymond of the pt's d/c from the hospital, the daughter reported back to MANJEET that she was not made aware by CM that the destination for rehab would be [facility].    SLIM then stated that pt's daughter approved [facility] as the choice for placement over the phone right at that moment, and gave permission to proceed with pt's d/c plan.    Per MANJEET's request, CM informed supervisor Susy Gavin of this situation.

## 2025-01-11 NOTE — ASSESSMENT & PLAN NOTE
Possibly steroid induced or leukemoid reaction to fall.   Follow up infectious workup including Bcx - 1 out of 2 staph epidermis, staph coagulase-negative which is likely contaminant., UA - negative, CXR - clear and COVID/Flu - negative  Monitor off abx

## 2025-01-11 NOTE — ASSESSMENT & PLAN NOTE
Maintained on hydrocortisone 20 mg a.m. and 15 mg p.m. along with Florinef 0.2 mg daily  Follows endocrinology outpatient  Increase dose to 40mg am/30mg pm awaiting ruling out infectious process/acute illness - ruled out. Restarted home doses 1/8.

## 2025-01-11 NOTE — DISCHARGE SUMMARY
Discharge Summary - Hospitalist   Name: Karson You 69 y.o. male I MRN: [id number]  Unit/Bed#: CW2 215-02 I Date of Admission: 1/7/2025   Date of Service: 1/11/2025 I Hospital Day: 3     Assessment & Plan  Fall  Pmhx of adrenal insufficiency and orthostatic hypotension who presented with 2 falls. 1 episode while trying to go to the bathroom.  No head strike or LOC reported. Daughter reports he was briefly unresponsive and was gasping for air.   BP low 92/55 in the ED despite IV hydration    Suspect  presentation secondary to orthostatic hypotension.   Orthostatics positive. Placed on scheduled midodrine 5mg TID with holding parameters ( daughter reports this is changed to prn only ). Continue Florinef and Cortef. Stocking compression and abdominal binder.   Would commend strict compliance with compression stockings and abdominal binder while ambulating at rehab.  Telemetry - no events  PT/OT - rehab, pending placement   Adrenal insufficiency (HCC)  Maintained on hydrocortisone 20 mg a.m. and 15 mg p.m. along with Florinef 0.2 mg daily  Follows endocrinology outpatient  Increase dose to 40mg am/30mg pm awaiting ruling out infectious process/acute illness - ruled out. Restarted home doses 1/8.  Orthostatic hypotension  Known history with previously maintained on midodrine and salt tabs which have now since then been transition to as needed given outpatient concern for volume overload  Presenting with persistent hypotension in the setting of falls  Continue hydrocortisone as below  Midodrine 5 mg TID. Would keep as a standing dose with hold parameters as opposed to prn   Monitor orthostatic vitals - positive   Continue compression stockings and abdominal binders when ambulating  History of CVA (cerebrovascular accident)  Continue aspirin and statin  Paroxysmal atrial fibrillation (HCC)  Rate controlled  Continue Eliquis  Not on any beta-blocker  Hypomagnesemia  Magnesium 1.7, repleted.  Monitor  Chronic diastolic  heart failure (HCC)  Wt Readings from Last 3 Encounters:   09/05/24 77.8 kg (171 lb 9.6 oz)   06/27/24 80.5 kg (177 lb 6.4 oz)   06/13/24 88.9 kg (196 lb)     12/2023 TTE EF 60%  Maintained on Lasix 20 mg daily prn.   I/Os  Type 1 diabetes mellitus with hyperglycemia (HCC)  Lab Results   Component Value Date    HGBA1C 7.0 (H) 06/13/2024       Recent Labs     01/10/25  1616 01/10/25  2146 01/11/25  0454 01/11/25  0654   POCGLU 151* 174* 78 84     Home regimen: Toujeo U300 18 units daily and lispro 8 units 3 times daily with meals  Blood glucose borderline, will reduce regimen to Lantus 10 units nightly and Humalog 10 units 3 times daily with meals plus sliding scale.  Patient has a Dexcom, would monitor blood sugars closely and adjust regimen as needed.  Does have history of ICU stay for hypoglycemia per daughter and she is very concerned about this.  Diabetic diet  Leukocytosis  Possibly steroid induced or leukemoid reaction to fall.   Follow up infectious workup including Bcx - 1 out of 2 staph epidermis, staph coagulase-negative which is likely contaminant., UA - negative, CXR - clear and COVID/Flu - negative  Monitor off abx       Medical Problems       Resolved Problems  Date Reviewed: 1/10/2025   None       Discharging Physician / Practitioner: NACHO Devine  PCP: NACHO Kenney  Admission Date:   Admission Orders (From admission, onward)       Ordered        01/08/25 1218  INPATIENT ADMISSION  Once            01/07/25 1258  Place in Observation  Once                          Discharge Date: 01/11/25    Consultations During Hospital Stay:  PT/OT  Case management       Procedures Performed:   none    Significant Findings / Test Results:   CXR negative  1 of 2 blood cultures positive for Staph epidermidis which is staph coagulase-negative, likely contaminant.  Other blood culture negative.  Flu COVID RSV negative    Incidental Findings:   none       Test Results Pending at Discharge (will  require follow up):   none     Outpatient Tests Requested:  Follow-up with PCP    Complications:  none    Reason for Admission: fall    Hospital Course:   Karson You is a 69 y.o. male patient who originally presented to the hospital on 1/7/2025 due to fall, likely secondary to orthostatic hypotension.  Orthostatics were positive.  He was placed on scheduled midodrine in addition to his Florinef.  He is also on Cortef for history of adrenal insufficiency and is known to endocrinology outpatient.    Infectious workup negative.  I did reduce his insulin given borderline blood glucose.  He does have a Dexcom and has a history of hypoglycemia related ICU stay so would continue to monitor this closely and make additional adjustments to regimen as needed.         Please see above list of diagnoses and related plan for additional information.     Condition at Discharge: stable    Discharge Day Visit / Exam:   Subjective:    Vitals: Blood Pressure: 137/80 (01/11/25 0637)  Pulse: 89 (01/11/25 0637)  Temperature: 97.5 °F (36.4 °C) (01/11/25 0632)  Temp Source: Oral (01/08/25 0735)  Respirations: 20 (01/11/25 0632)  SpO2: 96 % (01/11/25 0637)  Physical Exam  Vitals and nursing note reviewed.   Constitutional:       General: He is not in acute distress.     Appearance: He is obese.   Cardiovascular:      Rate and Rhythm: Normal rate.   Pulmonary:      Breath sounds: Normal breath sounds. No stridor.   Abdominal:      Tenderness: There is no abdominal tenderness.   Musculoskeletal:         General: No swelling.   Skin:     General: Skin is warm.   Neurological:      Mental Status: He is alert. Mental status is at baseline.   Psychiatric:         Mood and Affect: Mood normal.          Discussion with Family: Updated  (daughter) via phone.    Discharge instructions/Information to patient and family:   See after visit summary for information provided to patient and family.      Provisions for Follow-Up Care:  See  after visit summary for information related to follow-up care and any pertinent home health orders.      Mobility at time of Discharge:   Basic Mobility Inpatient Raw Score: 14  JH-HLM Goal: 4: Move to chair/commode  JH-HLM Achieved: 4: Move to chair/commode  HLM Goal achieved. Continue to encourage appropriate mobility.     Disposition:   Other Skilled Nursing Facility at [facility]     Planned Readmission: no    Discharge Medications:  See after visit summary for reconciled discharge medications provided to patient and/or family.      Administrative Statements   Discharge Statement:  I have spent a total time of 40 minutes in caring for this patient on the day of the visit/encounter.    **Please Note: This note may have been constructed using a voice recognition system**

## 2025-01-11 NOTE — CASE MANAGEMENT
Case Management Discharge Note    Patient name Karson You  Location CW2 215/CW2 215-02 MRN [id number]  : 1955 Date 2025       Current Admission Date: 2025  Current Admission Diagnosis:Fall      LOS (days): 3  Geometric Mean LOS (GMLOS) (days): 3.4  Days to GMLOS:0.5     OBJECTIVE:  Risk of Unplanned Readmission Score: 16.29   Current admission status: Inpatient   Primary Insurance: [facility] REP    DISCHARGE DETAILS:  Discharge planning discussed with:: Ofelia Raymond (pt's daughter)  Freedom of Choice: Yes  CM contacted family/caregiver?: Yes  Were Treatment Team discharge recommendations reviewed with patient/caregiver?: Yes  Did patient/caregiver verbalize understanding of patient care needs?: Yes  Were patient/caregiver advised of the risks associated with not following Treatment Team discharge recommendations?: Yes    Contacts  Patient Contacts: Ofelia Raymond (pt's daughter)  Relationship to Patient:: Family  Contact Method: Phone  Phone Number: [phone]  Reason/Outcome: Continuity of Care, Emergency Contact, Referral, Discharge Planning    Treatment Team Recommendation: Short Term Rehab  Discharge Destination Plan:: Short Term Rehab  Transport at Discharge : Stretcher van  Number/Name of Dispatcher: [phone]  Transported by (Company and Unit #): [employer]  ETA of Transport (Date): 25  ETA of Transport (Time): 1000 (10:00AM)    Additional Comments: Pt is cleared for d/c by MANJEET Hernandez. RN Marie Gambino was notified of pt's d/c order. Pt is accepted for short-term skilled rehab placement at [facility] for his aftercare plan. [facility]LEX[facility] DC Support staff obtained auth# [id number] the previous day for pt's SNF placement from pt's [facility] Medicare-replacement insurance plan. ALEXANDR Whitney arranged Stretcher Van transport via [employer] on the previous day for a 10:00AM pickup today to transport  pt to SNF. The pt and his daughter Ofelia Raymond were both informed of d/c by MANJEET Hernandez. PCS for transport was completed by CM Evie Whitney on the previous day. Chart copy for SNF was requested from RN. No further CM needs at this time.    Accepting Facility Name, City & State : [facility] / [place]  Receiving Facility/Agency Phone Number: [phone]  Facility/Agency Fax Number: [phone]  Facility Insurance Auth Number: [id number]

## 2025-01-11 NOTE — ASSESSMENT & PLAN NOTE
Lab Results   Component Value Date    HGBA1C 7.0 (H) 06/13/2024       Recent Labs     01/10/25  1616 01/10/25  2146 01/11/25  0454 01/11/25  0654   POCGLU 151* 174* 78 84     Home regimen: Toujeo U300 18 units daily and lispro 8 units 3 times daily with meals  Blood glucose borderline, will reduce regimen to Lantus 10 units nightly and Humalog 10 units 3 times daily with meals plus sliding scale.  Patient has a Dexcom, would monitor blood sugars closely and adjust regimen as needed.  Does have history of ICU stay for hypoglycemia per daughter and she is very concerned about this.  Diabetic diet

## 2025-01-12 LAB — BACTERIA BLD CULT: NORMAL

## 2025-01-14 RX ORDER — FLUDROCORTISONE ACETATE 0.1 MG/1
0.2 TABLET ORAL DAILY
Qty: 60 TABLET | Refills: 0 | Status: SHIPPED | OUTPATIENT
Start: 2025-01-14

## 2025-01-17 ENCOUNTER — HOSPITAL ENCOUNTER (EMERGENCY)
Facility: HOSPITAL | Age: 70
Discharge: HOME | DRG: 638 | End: 2025-01-18
Attending: EMERGENCY MEDICINE
Payer: COMMERCIAL

## 2025-01-17 DIAGNOSIS — E10.65 TYPE 1 DIABETES MELLITUS WITH HYPERGLYCEMIA (HCC): Primary | ICD-10-CM

## 2025-01-17 DIAGNOSIS — R79.89 ELEVATED LACTIC ACID LEVEL: ICD-10-CM

## 2025-01-17 LAB
ALBUMIN SERPL BCG-MCNC: 3.3 G/DL (ref 3.5–5)
ALP SERPL-CCNC: 110 U/L (ref 34–104)
ALT SERPL W P-5'-P-CCNC: 26 U/L (ref 7–52)
ANION GAP SERPL CALCULATED.3IONS-SCNC: 9 MMOL/L (ref 4–13)
AST SERPL W P-5'-P-CCNC: 29 U/L (ref 13–39)
ATRIAL RATE: 75 BPM
B-OH-BUTYR SERPL-MCNC: 0.23 MMOL/L (ref 0.02–0.27)
BASE EX.OXY STD BLDV CALC-SCNC: 53.8 % (ref 60–80)
BASE EXCESS BLDV CALC-SCNC: -0.9 MMOL/L
BILIRUB SERPL-MCNC: 0.53 MG/DL (ref 0.2–1)
BUN SERPL-MCNC: 19 MG/DL (ref 5–25)
CALCIUM ALBUM COR SERPL-MCNC: 8.4 MG/DL (ref 8.3–10.1)
CALCIUM SERPL-MCNC: 7.8 MG/DL (ref 8.4–10.2)
CHLORIDE SERPL-SCNC: 99 MMOL/L (ref 96–108)
CO2 SERPL-SCNC: 26 MMOL/L (ref 21–32)
CREAT SERPL-MCNC: 1.23 MG/DL (ref 0.6–1.3)
ERYTHROCYTE [DISTWIDTH] IN BLOOD BY AUTOMATED COUNT: 14.6 % (ref 11.6–15.1)
GFR SERPL CREATININE-BSD FRML MDRD: 60 ML/MIN/1.73SQ M
GLUCOSE SERPL-MCNC: 326 MG/DL (ref 65–140)
GLUCOSE SERPL-MCNC: 450 MG/DL (ref 65–140)
GLUCOSE SERPL-MCNC: 497 MG/DL (ref 65–140)
GLUCOSE SERPL-MCNC: 576 MG/DL (ref 65–140)
HCO3 BLDV-SCNC: 25 MMOL/L (ref 24–30)
HCT VFR BLD AUTO: 34.5 % (ref 36.5–49.3)
HGB BLD-MCNC: 11 G/DL (ref 12–17)
LACTATE SERPL-SCNC: 2.7 MMOL/L (ref 0.5–2)
LACTATE SERPL-SCNC: 3.5 MMOL/L (ref 0.5–2)
MAGNESIUM SERPL-MCNC: 1.8 MG/DL (ref 1.9–2.7)
MCH RBC QN AUTO: 31.5 PG (ref 26.8–34.3)
MCHC RBC AUTO-ENTMCNC: 31.9 G/DL (ref 31.4–37.4)
MCV RBC AUTO: 99 FL (ref 82–98)
O2 CT BLDV-SCNC: 8.7 ML/DL
P AXIS: 26 DEGREES
PCO2 BLDV: 46.6 MM HG (ref 42–50)
PH BLDV: 7.35 [PH] (ref 7.3–7.4)
PHOSPHATE SERPL-MCNC: 2.6 MG/DL (ref 2.3–4.1)
PLATELET # BLD AUTO: 242 THOUSANDS/UL (ref 149–390)
PMV BLD AUTO: 9.9 FL (ref 8.9–12.7)
PO2 BLDV: 29.4 MM HG (ref 35–45)
POTASSIUM SERPL-SCNC: 3.6 MMOL/L (ref 3.5–5.3)
PR INTERVAL: 210 MS
PROT SERPL-MCNC: 6.1 G/DL (ref 6.4–8.4)
QRS AXIS: 21 DEGREES
QRSD INTERVAL: 68 MS
QT INTERVAL: 404 MS
QTC INTERVAL: 451 MS
RBC # BLD AUTO: 3.49 MILLION/UL (ref 3.88–5.62)
SODIUM SERPL-SCNC: 134 MMOL/L (ref 135–147)
T WAVE AXIS: 90 DEGREES
VENTRICULAR RATE: 75 BPM
WBC # BLD AUTO: 9.23 THOUSAND/UL (ref 4.31–10.16)

## 2025-01-17 PROCEDURE — 82010 KETONE BODYS QUAN: CPT

## 2025-01-17 PROCEDURE — 99284 EMERGENCY DEPT VISIT MOD MDM: CPT | Performed by: EMERGENCY MEDICINE

## 2025-01-17 PROCEDURE — 93005 ELECTROCARDIOGRAM TRACING: CPT

## 2025-01-17 PROCEDURE — 96361 HYDRATE IV INFUSION ADD-ON: CPT

## 2025-01-17 PROCEDURE — 82948 REAGENT STRIP/BLOOD GLUCOSE: CPT

## 2025-01-17 PROCEDURE — 82805 BLOOD GASES W/O2 SATURATION: CPT

## 2025-01-17 PROCEDURE — 36415 COLL VENOUS BLD VENIPUNCTURE: CPT

## 2025-01-17 PROCEDURE — 96374 THER/PROPH/DIAG INJ IV PUSH: CPT

## 2025-01-17 PROCEDURE — 93010 ELECTROCARDIOGRAM REPORT: CPT | Performed by: INTERNAL MEDICINE

## 2025-01-17 PROCEDURE — 84100 ASSAY OF PHOSPHORUS: CPT

## 2025-01-17 PROCEDURE — 80053 COMPREHEN METABOLIC PANEL: CPT

## 2025-01-17 PROCEDURE — 83605 ASSAY OF LACTIC ACID: CPT

## 2025-01-17 PROCEDURE — 99284 EMERGENCY DEPT VISIT MOD MDM: CPT

## 2025-01-17 PROCEDURE — 85027 COMPLETE CBC AUTOMATED: CPT

## 2025-01-17 PROCEDURE — 83735 ASSAY OF MAGNESIUM: CPT

## 2025-01-17 RX ORDER — SODIUM CHLORIDE 9 MG/ML
3 INJECTION INTRAVENOUS
Status: DISCONTINUED | OUTPATIENT
Start: 2025-01-17 | End: 2025-01-18 | Stop reason: HOSPADM

## 2025-01-17 RX ADMIN — SODIUM CHLORIDE 1000 ML: 0.9 INJECTION, SOLUTION INTRAVENOUS at 20:48

## 2025-01-17 RX ADMIN — SODIUM CHLORIDE 1000 ML: 0.9 INJECTION, SOLUTION INTRAVENOUS at 22:20

## 2025-01-17 RX ADMIN — INSULIN HUMAN 9 UNITS: 100 INJECTION, SOLUTION PARENTERAL at 21:26

## 2025-01-18 ENCOUNTER — HOSPITAL ENCOUNTER (INPATIENT)
Facility: HOSPITAL | Age: 70
LOS: 2 days | Discharge: HOME HEALTH | DRG: 638 | End: 2025-01-21
Attending: EMERGENCY MEDICINE | Admitting: INTERNAL MEDICINE
Payer: COMMERCIAL

## 2025-01-18 VITALS
HEART RATE: 82 BPM | HEIGHT: 64 IN | RESPIRATION RATE: 16 BRPM | OXYGEN SATURATION: 95 % | SYSTOLIC BLOOD PRESSURE: 152 MMHG | TEMPERATURE: 98.1 F | DIASTOLIC BLOOD PRESSURE: 70 MMHG | WEIGHT: 197.31 LBS | BODY MASS INDEX: 33.69 KG/M2

## 2025-01-18 DIAGNOSIS — R73.9 HYPERGLYCEMIA: Primary | ICD-10-CM

## 2025-01-18 DIAGNOSIS — E10.65 TYPE 1 DIABETES MELLITUS WITH HYPERGLYCEMIA (HCC): ICD-10-CM

## 2025-01-18 DIAGNOSIS — I50.32 CHRONIC DIASTOLIC HEART FAILURE (HCC): ICD-10-CM

## 2025-01-18 DIAGNOSIS — I21.A1 TYPE 2 MI (MYOCARDIAL INFARCTION) (HCC): ICD-10-CM

## 2025-01-18 PROBLEM — E16.2 HYPOGLYCEMIA: Status: RESOLVED | Noted: 2022-10-19 | Resolved: 2025-01-18

## 2025-01-18 PROBLEM — I95.9 HYPOTENSION: Status: RESOLVED | Noted: 2023-11-10 | Resolved: 2025-01-18

## 2025-01-18 PROBLEM — I63.9 CEREBROVASCULAR ACCIDENT (CVA) (HCC): Status: RESOLVED | Noted: 2023-03-30 | Resolved: 2025-01-18

## 2025-01-18 PROBLEM — R32 URINARY INCONTINENCE: Status: RESOLVED | Noted: 2024-09-05 | Resolved: 2025-01-18

## 2025-01-18 PROBLEM — E10.9 BRITTLE DIABETES MELLITUS (HCC): Status: RESOLVED | Noted: 2023-11-10 | Resolved: 2025-01-18

## 2025-01-18 PROBLEM — E10.43 TYPE 1 DIABETES MELLITUS WITH DIABETIC AUTONOMIC (POLY)NEUROPATHY (HCC): Status: RESOLVED | Noted: 2022-10-19 | Resolved: 2025-01-18

## 2025-01-18 PROBLEM — I48.0 PAROXYSMAL ATRIAL FIBRILLATION (HCC): Status: RESOLVED | Noted: 2023-11-10 | Resolved: 2025-01-18

## 2025-01-18 LAB
ALBUMIN SERPL BCG-MCNC: 3.5 G/DL (ref 3.5–5)
ALP SERPL-CCNC: 99 U/L (ref 34–104)
ALT SERPL W P-5'-P-CCNC: 20 U/L (ref 7–52)
ANION GAP SERPL CALCULATED.3IONS-SCNC: 11 MMOL/L (ref 4–13)
AST SERPL W P-5'-P-CCNC: 13 U/L (ref 13–39)
BASE EX.OXY STD BLDV CALC-SCNC: 42.8 % (ref 60–80)
BASE EXCESS BLDV CALC-SCNC: -2.5 MMOL/L
BASOPHILS # BLD AUTO: 0.02 THOUSANDS/ΜL (ref 0–0.1)
BASOPHILS NFR BLD AUTO: 0 % (ref 0–1)
BILIRUB DIRECT SERPL-MCNC: 0.18 MG/DL (ref 0–0.2)
BILIRUB SERPL-MCNC: 0.66 MG/DL (ref 0.2–1)
BILIRUB UR QL STRIP: NEGATIVE
BUN SERPL-MCNC: 16 MG/DL (ref 5–25)
CALCIUM SERPL-MCNC: 8 MG/DL (ref 8.4–10.2)
CHLORIDE SERPL-SCNC: 100 MMOL/L (ref 96–108)
CLARITY UR: CLEAR
CO2 SERPL-SCNC: 23 MMOL/L (ref 21–32)
COLOR UR: YELLOW
CREAT SERPL-MCNC: 1.1 MG/DL (ref 0.6–1.3)
EOSINOPHIL # BLD AUTO: 0.02 THOUSAND/ΜL (ref 0–0.61)
EOSINOPHIL NFR BLD AUTO: 0 % (ref 0–6)
ERYTHROCYTE [DISTWIDTH] IN BLOOD BY AUTOMATED COUNT: 14.6 % (ref 11.6–15.1)
GFR SERPL CREATININE-BSD FRML MDRD: 69 ML/MIN/1.73SQ M
GLUCOSE SERPL-MCNC: 325 MG/DL (ref 65–140)
GLUCOSE SERPL-MCNC: 333 MG/DL (ref 65–140)
GLUCOSE SERPL-MCNC: 348 MG/DL (ref 65–140)
GLUCOSE SERPL-MCNC: 370 MG/DL (ref 65–140)
GLUCOSE SERPL-MCNC: 370 MG/DL (ref 65–140)
GLUCOSE SERPL-MCNC: 456 MG/DL (ref 65–140)
GLUCOSE SERPL-MCNC: 481 MG/DL (ref 65–140)
GLUCOSE SERPL-MCNC: 509 MG/DL (ref 65–140)
GLUCOSE SERPL-MCNC: 582 MG/DL (ref 65–140)
GLUCOSE UR STRIP-MCNC: ABNORMAL MG/DL
HCO3 BLDV-SCNC: 22.2 MMOL/L (ref 24–30)
HCT VFR BLD AUTO: 34.3 % (ref 36.5–49.3)
HGB BLD-MCNC: 11.3 G/DL (ref 12–17)
HGB UR QL STRIP.AUTO: NEGATIVE
IMM GRANULOCYTES # BLD AUTO: 0.06 THOUSAND/UL (ref 0–0.2)
IMM GRANULOCYTES NFR BLD AUTO: 1 % (ref 0–2)
KETONES UR STRIP-MCNC: ABNORMAL MG/DL
LEUKOCYTE ESTERASE UR QL STRIP: NEGATIVE
LYMPHOCYTES # BLD AUTO: 0.84 THOUSANDS/ΜL (ref 0.6–4.47)
LYMPHOCYTES NFR BLD AUTO: 9 % (ref 14–44)
MAGNESIUM SERPL-MCNC: 1.8 MG/DL (ref 1.9–2.7)
MCH RBC QN AUTO: 32 PG (ref 26.8–34.3)
MCHC RBC AUTO-ENTMCNC: 32.9 G/DL (ref 31.4–37.4)
MCV RBC AUTO: 97 FL (ref 82–98)
MONOCYTES # BLD AUTO: 0.57 THOUSAND/ΜL (ref 0.17–1.22)
MONOCYTES NFR BLD AUTO: 6 % (ref 4–12)
NEUTROPHILS # BLD AUTO: 8.07 THOUSANDS/ΜL (ref 1.85–7.62)
NEUTS SEG NFR BLD AUTO: 84 % (ref 43–75)
NITRITE UR QL STRIP: NEGATIVE
NRBC BLD AUTO-RTO: 0 /100 WBCS
O2 CT BLDV-SCNC: 6.6 ML/DL
PCO2 BLDV: 38.1 MM HG (ref 42–50)
PH BLDV: 7.38 [PH] (ref 7.3–7.4)
PH UR STRIP.AUTO: 6 [PH] (ref 4.5–8)
PLATELET # BLD AUTO: 216 THOUSANDS/UL (ref 149–390)
PMV BLD AUTO: 9.8 FL (ref 8.9–12.7)
PO2 BLDV: 23.5 MM HG (ref 35–45)
POTASSIUM SERPL-SCNC: 3.6 MMOL/L (ref 3.5–5.3)
PROT SERPL-MCNC: 6.3 G/DL (ref 6.4–8.4)
PROT UR STRIP-MCNC: NEGATIVE MG/DL
RBC # BLD AUTO: 3.53 MILLION/UL (ref 3.88–5.62)
SODIUM SERPL-SCNC: 134 MMOL/L (ref 135–147)
SP GR UR STRIP.AUTO: 1.01 (ref 1–1.03)
TSH SERPL DL<=0.05 MIU/L-ACNC: 0.83 UIU/ML (ref 0.45–4.5)
UROBILINOGEN UR QL STRIP.AUTO: 0.2 E.U./DL
WBC # BLD AUTO: 9.58 THOUSAND/UL (ref 4.31–10.16)

## 2025-01-18 PROCEDURE — 99285 EMERGENCY DEPT VISIT HI MDM: CPT | Performed by: EMERGENCY MEDICINE

## 2025-01-18 PROCEDURE — 36415 COLL VENOUS BLD VENIPUNCTURE: CPT | Performed by: EMERGENCY MEDICINE

## 2025-01-18 PROCEDURE — 82805 BLOOD GASES W/O2 SATURATION: CPT | Performed by: EMERGENCY MEDICINE

## 2025-01-18 PROCEDURE — 82948 REAGENT STRIP/BLOOD GLUCOSE: CPT

## 2025-01-18 PROCEDURE — 96376 TX/PRO/DX INJ SAME DRUG ADON: CPT

## 2025-01-18 PROCEDURE — 99222 1ST HOSP IP/OBS MODERATE 55: CPT

## 2025-01-18 PROCEDURE — 96372 THER/PROPH/DIAG INJ SC/IM: CPT

## 2025-01-18 PROCEDURE — 84443 ASSAY THYROID STIM HORMONE: CPT | Performed by: EMERGENCY MEDICINE

## 2025-01-18 PROCEDURE — 81003 URINALYSIS AUTO W/O SCOPE: CPT

## 2025-01-18 PROCEDURE — 96361 HYDRATE IV INFUSION ADD-ON: CPT

## 2025-01-18 PROCEDURE — 85025 COMPLETE CBC W/AUTO DIFF WBC: CPT | Performed by: EMERGENCY MEDICINE

## 2025-01-18 PROCEDURE — 80048 BASIC METABOLIC PNL TOTAL CA: CPT | Performed by: EMERGENCY MEDICINE

## 2025-01-18 PROCEDURE — 80076 HEPATIC FUNCTION PANEL: CPT | Performed by: EMERGENCY MEDICINE

## 2025-01-18 PROCEDURE — 83735 ASSAY OF MAGNESIUM: CPT | Performed by: EMERGENCY MEDICINE

## 2025-01-18 PROCEDURE — 96374 THER/PROPH/DIAG INJ IV PUSH: CPT

## 2025-01-18 PROCEDURE — 99283 EMERGENCY DEPT VISIT LOW MDM: CPT

## 2025-01-18 RX ORDER — MIDODRINE HYDROCHLORIDE 5 MG/1
5 TABLET ORAL
Status: DISCONTINUED | OUTPATIENT
Start: 2025-01-18 | End: 2025-01-18 | Stop reason: HOSPADM

## 2025-01-18 RX ORDER — INSULIN LISPRO 100 [IU]/ML
10 INJECTION, SOLUTION INTRAVENOUS; SUBCUTANEOUS ONCE
Status: DISCONTINUED | OUTPATIENT
Start: 2025-01-18 | End: 2025-01-18

## 2025-01-18 RX ORDER — INSULIN LISPRO 100 [IU]/ML
12 INJECTION, SOLUTION INTRAVENOUS; SUBCUTANEOUS ONCE
Status: DISCONTINUED | OUTPATIENT
Start: 2025-01-18 | End: 2025-01-18

## 2025-01-18 RX ORDER — INSULIN LISPRO 100 [IU]/ML
1-6 INJECTION, SOLUTION INTRAVENOUS; SUBCUTANEOUS
Status: CANCELLED | OUTPATIENT
Start: 2025-01-19

## 2025-01-18 RX ORDER — ASPIRIN 81 MG/1
81 TABLET, CHEWABLE ORAL DAILY
Status: DISCONTINUED | OUTPATIENT
Start: 2025-01-18 | End: 2025-01-18 | Stop reason: HOSPADM

## 2025-01-18 RX ORDER — ATORVASTATIN CALCIUM 10 MG/1
10 TABLET, FILM COATED ORAL DAILY
Status: DISCONTINUED | OUTPATIENT
Start: 2025-01-18 | End: 2025-01-18 | Stop reason: HOSPADM

## 2025-01-18 RX ORDER — INSULIN LISPRO 100 [IU]/ML
12 INJECTION, SOLUTION INTRAVENOUS; SUBCUTANEOUS
Qty: 10.8 ML | Refills: 0 | Status: SHIPPED | OUTPATIENT
Start: 2025-01-18 | End: 2025-01-21

## 2025-01-18 RX ORDER — INSULIN LISPRO 100 [IU]/ML
8 INJECTION, SOLUTION INTRAVENOUS; SUBCUTANEOUS ONCE
Status: COMPLETED | OUTPATIENT
Start: 2025-01-18 | End: 2025-01-18

## 2025-01-18 RX ORDER — INSULIN GLARGINE 100 [IU]/ML
22 INJECTION, SOLUTION SUBCUTANEOUS
Qty: 10 ML | Refills: 0 | Status: SHIPPED | OUTPATIENT
Start: 2025-01-18 | End: 2025-01-21

## 2025-01-18 RX ORDER — INSULIN GLARGINE 100 [IU]/ML
22 INJECTION, SOLUTION SUBCUTANEOUS ONCE
Status: COMPLETED | OUTPATIENT
Start: 2025-01-18 | End: 2025-01-18

## 2025-01-18 RX ORDER — ATORVASTATIN CALCIUM 10 MG/1
10 TABLET, FILM COATED ORAL
Status: DISCONTINUED | OUTPATIENT
Start: 2025-01-19 | End: 2025-01-21 | Stop reason: HOSPADM

## 2025-01-18 RX ORDER — INSULIN GLARGINE 100 [IU]/ML
22 INJECTION, SOLUTION SUBCUTANEOUS
Status: DISCONTINUED | OUTPATIENT
Start: 2025-01-18 | End: 2025-01-18

## 2025-01-18 RX ADMIN — APIXABAN 5 MG: 5 TABLET, FILM COATED ORAL at 21:33

## 2025-01-18 RX ADMIN — INSULIN HUMAN 6 UNITS: 100 INJECTION, SOLUTION PARENTERAL at 18:09

## 2025-01-18 RX ADMIN — SODIUM CHLORIDE 1000 ML: 0.9 INJECTION, SOLUTION INTRAVENOUS at 15:07

## 2025-01-18 RX ADMIN — INSULIN HUMAN 9 UNITS: 100 INJECTION, SOLUTION PARENTERAL at 16:10

## 2025-01-18 RX ADMIN — MIDODRINE HYDROCHLORIDE 5 MG: 5 TABLET ORAL at 06:18

## 2025-01-18 RX ADMIN — INSULIN LISPRO 8 UNITS: 100 INJECTION, SOLUTION INTRAVENOUS; SUBCUTANEOUS at 21:33

## 2025-01-18 RX ADMIN — INSULIN GLARGINE 22 UNITS: 100 INJECTION, SOLUTION SUBCUTANEOUS at 20:16

## 2025-01-18 RX ADMIN — INSULIN HUMAN 6 UNITS: 100 INJECTION, SOLUTION PARENTERAL at 17:16

## 2025-01-18 NOTE — ED NOTES
Report given to pickup crew and pt educated on d/c. RN attempted to call facility multiple times to give update, but unfortunately was met with busy signal each time.      Julia Leschinsky, RN  01/18/25 0833

## 2025-01-18 NOTE — ED NOTES
Pt soiled linens. Pt's linens changed. Pt provided with a warm blanket and lights dimmed.     Tiffany Rivera RN  01/18/25 0631

## 2025-01-18 NOTE — ED ATTENDING ATTESTATION
1/17/2025  I, Mendel Griffin MD, saw and evaluated the patient. I have discussed the patient with the resident/non-physician practitioner and agree with the resident's/non-physician practitioner's findings, Plan of Care, and MDM as documented in the resident's/non-physician practitioner's note, except where noted. All available labs and Radiology studies were reviewed.  I was present for key portions of any procedure(s) performed by the resident/non-physician practitioner and I was immediately available to provide assistance.       At this point I agree with the current assessment done in the Emergency Department.  I have conducted an independent evaluation of this patient a history and physical is as follows:    Hx of type 1 DM, found to have elevated blood glucose without any complaints. Running high at facility, recently place after admission for fall with hypotension. Pt denies CP/SOB/F/C/N/V/D/C, no dysuria, burning on urination or blood in urine.     Gen: Pt is in NAD  HEENT: Head is atraumatic, EOM's intact, neck has FROM  Chest: CTAB, non-tender  Heart: RRR  Abdomen: Soft, NT/ND  Musculoskeletal: FROM in all extremities  Skin: No rash, no ecchymosis  Neuro: Awake, alert, oriented x4; Cranial nerves II-XII intact  Psych: Normal affect    MDM -  Will check CBC, electrolytes for dehydration, anion gap, kidney injury. Will check VBG, beta-hydroxybutyrate. Will give IV fluids, likely give insulin.    ED Course         Critical Care Time  Procedures

## 2025-01-18 NOTE — ED NOTES
Pt soiled brief. Pt cleaned and brief changed. Pt provided with warm blankets, water, and lights dimmed. Pt comfortable with no complaints.     Tiffany Rivera RN  01/18/25 2715

## 2025-01-18 NOTE — ED PROVIDER NOTES
Time reflects when diagnosis was documented in both MDM as applicable and the Disposition within this note       Time User Action Codes Description Comment    1/18/2025  8:22 PM Mendel Griffin Add [R73.9] Hyperglycemia     1/18/2025  8:22 PM Mendel Griffin Add [E10.65] Type 1 diabetes mellitus with hyperglycemia (HCC)     1/18/2025  8:23 PM Mendel Griffin Add [I50.32] Chronic diastolic heart failure (HCC)           ED Disposition       ED Disposition   Admit    Condition   Stable    Date/Time   Sat Jan 18, 2025 11:17 PM    Comment   Case was discussed with MANJEET and the patient's admission status was agreed to be Admission Status: observation status to the service of Dr. Caraballo .               Assessment & Plan       Medical Decision Making  1. Hyperglycemia - Will check CBC for leukocytosis, metabolic panel for electrolyte abnormalities and dehydration,  LFT's to assess GB dysfunction, VBG for acid/base status. Will give IV fluids.    Problems Addressed:  Hyperglycemia: chronic illness or injury with exacerbation, progression, or side effects of treatment    Amount and/or Complexity of Data Reviewed  External Data Reviewed: labs and notes.  Labs: ordered.    Risk  OTC drugs.  Prescription drug management.  Decision regarding hospitalization.        ED Course as of 01/19/25 1633   Sat Jan 18, 2025   2017 Spoke with endocrinology, explained patient's elevated glucose in setting of steroids. Recommend increasing Lantus to 22, increasing humalog to 12 units with meals. Feel patient does not require admission for these adjustments. Will convey this information to facility.   2301 Spoke again with daughter, she states the patient was taken off of his sliding scale insulin regimen at [facility] as he has an episode of hypoglycemia. Feel that likely he has been running high because currently he is only receiving low doses of insulin which is not being adjusted when his glucose goes up.         Medications   apixaban (ELIQUIS)  "tablet 5 mg (5 mg Oral Given 1/18/25 2133)   atorvastatin (LIPITOR) tablet 10 mg (has no administration in time range)   sodium chloride 0.9 % bolus 1,000 mL (0 mL Intravenous Stopped 1/18/25 1714)   insulin regular (HumuLIN R,NovoLIN R) injection 9 Units (9 Units Intravenous Given 1/18/25 1610)   insulin regular (HumuLIN R,NovoLIN R) injection 6 Units (6 Units Intravenous Given 1/18/25 1716)   insulin regular (HumuLIN R,NovoLIN R) injection 6 Units (6 Units Intravenous Given 1/18/25 1809)   insulin glargine (LANTUS) subcutaneous injection 22 Units 0.22 mL (22 Units Subcutaneous Given 1/18/25 2016)   insulin lispro (HumALOG/ADMELOG) 100 units/mL subcutaneous injection 8 Units (8 Units Subcutaneous Given 1/18/25 2133)       ED Risk Strat Scores                          SBIRT 20yo+      Flowsheet Row Most Recent Value   Initial Alcohol Screen: US AUDIT-C     1. How often do you have a drink containing alcohol? 0 Filed at: 01/18/2025 1420   2. How many drinks containing alcohol do you have on a typical day you are drinking?  0 Filed at: 01/18/2025 1420   3a. Male UNDER 65: How often do you have five or more drinks on one occasion? 0 Filed at: 01/18/2025 1420   3b. FEMALE Any Age, or MALE 65+: How often do you have 4 or more drinks on one occassion? 0 Filed at: 01/18/2025 1420   Audit-C Score 0 Filed at: 01/18/2025 1420   SUSANNE: How many times in the past year have you...    Used an illegal drug or used a prescription medication for non-medical reasons? Never Filed at: 01/18/2025 1420                            History of Present Illness       Chief Complaint   Patient presents with    Hyperglycemia - no symptoms     EMS from [facility] reading \"high\". Was here overnight for same. Denies symptoms.        Past Medical History:   Diagnosis Date    A-fib (HCC)     Adrenal insufficiency (HCC)     Atrial fibrillation (HCC)     Diabetes mellitus (HCC)     History of stroke 10/19/2022    Obesity, morbid (HCC) 11/14/2022    Shock " "([facility]) 12/14/2023    Stroke ([facility])     Type 2 MI (myocardial infarction) ([facility]) 11/03/2022      History reviewed. No pertinent surgical history.   Family History   Problem Relation Age of Onset    Heart disease Mother     Cancer Father     Multiple sclerosis Sister       Social History     Tobacco Use    Smoking status: Former     Current packs/day: 0.25     Average packs/day: 0.3 packs/day for 30.0 years (7.5 ttl pk-yrs)     Types: Cigarettes    Smokeless tobacco: Never   Vaping Use    Vaping status: Never Used   Substance Use Topics    Alcohol use: Not Currently     Alcohol/week: 5.0 standard drinks of alcohol     Types: 5 Cans of beer per week     Comment: Quit in august 2022    Drug use: Never      E-Cigarette/Vaping    E-Cigarette Use Never User       E-Cigarette/Vaping Substances    Nicotine No     THC No     CBD No     Flavoring No     Other No     Unknown No       I have reviewed and agree with the history as documented.     70 YO male presents from care facility with elevated blood glucose. Sister in room assists with history. Patient was in the ED overnight for the same, reading from the Dexcom was \"high\". Patient has offered no complaints. Sister states he is a brittle diabetic and there is concern that he could have DKA. During visit last night he was not found to be acidotic, had no anion gap. He was given hydration and insulin and discharged this morning. On arrival accucheck demonstrated a glucose of 481. Patient has not had any recent changes to his insulin regimen, he is not taking steroids. Pt denies CP/SOB/F/C/N/V/D/C, no dysuria, burning on urination or blood in urine.       History provided by:  Patient   used: No        Review of Systems   Constitutional:  Negative for fever.   HENT:  Negative for dental problem.    Eyes:  Negative for visual disturbance.   Respiratory:  Negative for shortness of breath.    Cardiovascular:  Negative for chest pain.   Gastrointestinal:  Negative " for abdominal pain, nausea and vomiting.   Genitourinary:  Negative for dysuria and frequency.   Musculoskeletal:  Negative for neck pain and neck stiffness.   Skin:  Negative for rash.   Neurological:  Negative for dizziness, weakness and light-headedness.   Psychiatric/Behavioral:  Negative for agitation, behavioral problems and confusion.    All other systems reviewed and are negative.          Objective       ED Triage Vitals   Temperature Pulse Blood Pressure Respirations SpO2 Patient Position - Orthostatic VS   01/18/25 1419 01/18/25 1419 01/18/25 1419 01/18/25 1419 01/18/25 1419 01/18/25 1419   98.4 °F (36.9 °C) 82 159/73 16 100 % Sitting      Temp Source Heart Rate Source BP Location FiO2 (%) Pain Score    01/18/25 1419 01/18/25 1419 01/18/25 1419 -- 01/19/25 0017    Oral Monitor Right arm  No Pain      Vitals      Date and Time Temp Pulse SpO2 Resp BP Pain Score FACES Pain Rating User   01/19/25 1548 -- 82 -- 18 133/79 -- -- CC   01/19/25 1545 97 °F (36.1 °C) 80 96 % 18 122/68 -- -- CC   01/19/25 1542 98.5 °F (36.9 °C) 75 97 % 18 144/74 -- -- CC   01/19/25 1417 -- 74 -- -- 128/68 -- -- EK   01/19/25 0803 97.7 °F (36.5 °C) 74 98 % 20 155/76 -- -- CC   01/19/25 0508 -- -- -- -- 142/72 -- -- BT   01/19/25 0035 -- -- -- -- -- No Pain -- BT   01/19/25 0032 97.2 °F (36.2 °C) 81 100 % 20 155/73 -- -- IN   01/19/25 0017 -- -- -- -- -- No Pain -- BT   01/18/25 2300 -- 75 100 % 18 154/73 -- -- CC   01/18/25 2028 -- 82 100 % 18 169/79 -- -- DM   01/18/25 1822 -- 78 99 % 16 146/64 -- -- JK   01/18/25 1715 -- 85 100 % 16 154/73 -- --    01/18/25 1558 -- 86 99 % 17 190/86 -- --    01/18/25 1509 -- 82 100 % 16 188/81 -- --    01/18/25 1419 98.4 °F (36.9 °C) 82 100 % 16 159/73 -- --             Physical Exam  Vitals and nursing note reviewed.   Constitutional:       Appearance: He is well-developed.   HENT:      Head: Normocephalic and atraumatic.   Eyes:      Extraocular Movements: Extraocular movements intact.    Cardiovascular:      Rate and Rhythm: Normal rate.   Pulmonary:      Effort: Pulmonary effort is normal.   Abdominal:      General: There is no distension.   Musculoskeletal:         General: Normal range of motion.      Cervical back: Normal range of motion.   Skin:     Findings: No rash.   Neurological:      Mental Status: He is alert and oriented to person, place, and time.   Psychiatric:         Behavior: Behavior normal.         Results Reviewed       Procedure Component Value Units Date/Time    Fingerstick Glucose (POCT) [[id number]]  (Abnormal) Collected: 01/18/25 2233    Lab Status: Final result Specimen: Blood Updated: 01/18/25 2234     POC Glucose 370 mg/dl     Fingerstick Glucose (POCT) [[id number]]  (Abnormal) Collected: 01/18/25 2115    Lab Status: Final result Specimen: Blood Updated: 01/18/25 2116     POC Glucose 348 mg/dl     Fingerstick Glucose (POCT) [[id number]]  (Abnormal) Collected: 01/18/25 2015    Lab Status: Final result Specimen: Blood Updated: 01/18/25 2016     POC Glucose 325 mg/dl     Fingerstick Glucose (POCT) [[id number]]  (Abnormal) Collected: 01/18/25 1845    Lab Status: Final result Specimen: Blood Updated: 01/18/25 1846     POC Glucose 333 mg/dl     TSH, 3rd generation with Free T4 reflex [[id number]]  (Normal) Collected: 01/18/25 1507    Lab Status: Final result Specimen: Blood from Arm, Left Updated: 01/18/25 1828     TSH 3RD GENERATON 0.828 uIU/mL     Urine Macroscopic, POC [[id number]]  (Abnormal) Collected: 01/18/25 1811    Lab Status: Final result Specimen: Urine Updated: 01/18/25 1812     Color, UA Yellow     Clarity, UA Clear     pH, UA 6.0     Leukocytes, UA Negative     Nitrite, UA Negative     Protein, UA Negative mg/dl      Glucose,  (1/2%) mg/dl      Ketones, UA 15 (1+) mg/dl      Urobilinogen, UA 0.2 E.U./dl      Bilirubin, UA Negative     Occult Blood, UA Negative     Specific Gravity, UA 1.015    Narrative:      CLINITEK RESULT    Fingerstick Glucose (POCT)  [[id number]]  (Abnormal) Collected: 01/18/25 1739    Lab Status: Final result Specimen: Blood Updated: 01/18/25 1740     POC Glucose 456 mg/dl     Fingerstick Glucose (POCT) [[id number]]  (Abnormal) Collected: 01/18/25 1629    Lab Status: Final result Specimen: Blood Updated: 01/18/25 1630     POC Glucose 509 mg/dl     Basic metabolic panel [[id number]]  (Abnormal) Collected: 01/18/25 1507    Lab Status: Final result Specimen: Blood from Arm, Left Updated: 01/18/25 1556     Sodium 134 mmol/L      Potassium 3.6 mmol/L      Chloride 100 mmol/L      CO2 23 mmol/L      ANION GAP 11 mmol/L      BUN 16 mg/dL      Creatinine 1.10 mg/dL      Glucose 582 mg/dL      Calcium 8.0 mg/dL      eGFR 69 ml/min/1.73sq m     Narrative:      [employer] guidelines for Chronic Kidney Disease (CKD):     Stage 1 with normal or high GFR (GFR > 90 mL/min/1.73 square meters)    Stage 2 Mild CKD (GFR = 60-89 mL/min/1.73 square meters)    Stage 3A Moderate CKD (GFR = 45-59 mL/min/1.73 square meters)    Stage 3B Moderate CKD (GFR = 30-44 mL/min/1.73 square meters)    Stage 4 Severe CKD (GFR = 15-29 mL/min/1.73 square meters)    Stage 5 End Stage CKD (GFR <15 mL/min/1.73 square meters)  Note: GFR calculation is accurate only with a steady state creatinine    Hepatic function panel [[id number]]  (Abnormal) Collected: 01/18/25 1507    Lab Status: Final result Specimen: Blood from Arm, Left Updated: 01/18/25 1550     Total Bilirubin 0.66 mg/dL      Bilirubin, Direct 0.18 mg/dL      Alkaline Phosphatase 99 U/L      AST 13 U/L      ALT 20 U/L      Total Protein 6.3 g/dL      Albumin 3.5 g/dL     Magnesium [[id number]]  (Abnormal) Collected: 01/18/25 1507    Lab Status: Final result Specimen: Blood from Arm, Left Updated: 01/18/25 1550     Magnesium 1.8 mg/dL     Blood gas, venous [[id number]]  (Abnormal) Collected: 01/18/25 1505    Lab Status: Final result Specimen: Blood from Arm, Left Updated: 01/18/25 1528     pH, Darvin 7.384      pCO2, Darvin 38.1 mm Hg      pO2, Darvin 23.5 mm Hg      HCO3, Darvin 22.2 mmol/L      Base Excess, Darvin -2.5 mmol/L      O2 Content, Darvin 6.6 ml/dL      O2 HGB, VENOUS 42.8 %     CBC and differential [[id number]]  (Abnormal) Collected: 01/18/25 1507    Lab Status: Final result Specimen: Blood from Arm, Left Updated: 01/18/25 1516     WBC 9.58 Thousand/uL      RBC 3.53 Million/uL      Hemoglobin 11.3 g/dL      Hematocrit 34.3 %      MCV 97 fL      MCH 32.0 pg      MCHC 32.9 g/dL      RDW 14.6 %      MPV 9.8 fL      Platelets 216 Thousands/uL      nRBC 0 /100 WBCs      Segmented % 84 %      Immature Grans % 1 %      Lymphocytes % 9 %      Monocytes % 6 %      Eosinophils Relative 0 %      Basophils Relative 0 %      Absolute Neutrophils 8.07 Thousands/µL      Absolute Immature Grans 0.06 Thousand/uL      Absolute Lymphocytes 0.84 Thousands/µL      Absolute Monocytes 0.57 Thousand/µL      Eosinophils Absolute 0.02 Thousand/µL      Basophils Absolute 0.02 Thousands/µL     Fingerstick Glucose (POCT) [[id number]]  (Abnormal) Collected: 01/18/25 1420    Lab Status: Final result Specimen: Blood Updated: 01/18/25 1421     POC Glucose 481 mg/dl             No orders to display       Procedures    ED Medication and Procedure Management   Prior to Admission Medications   Prescriptions Last Dose Informant Patient Reported? Taking?   Continuous Blood Gluc  (Dexcom G7 ) CYN 1/18/2025 Child No Yes   Sig: Use 1 each continuous   Continuous Glucose Sensor (Dexcom G7 Sensor) 1/18/2025  No Yes   Sig: Use 1 Device every 10 days   Insulin Pen Needle (BD Pen Needle Josie 2nd Gen) 32G X 4 MM MISC 1/18/2025 Child No Yes   Sig: FOR USE WITH INSULIN PEN. PHARMACY MAY DISPENSE BRAND COVERED BY INSURANCE.   Insulin Pen Needle (BD Pen Needle Josie 2nd Gen) 32G X 4 MM MISC 1/18/2025  No Yes   Sig: For use with insulin pen 4 times daily. Pharmacy may dispense brand covered by insurance.   Magnesium Oxide 400 MG CAPS 1/18/2025 Child No Yes    Sig: Take 1 tablet (400 mg total) by mouth 2 (two) times a day   acetaminophen (TYLENOL) 325 mg tablet Past Week Child No Yes   Sig: Take 3 tablets (975 mg total) by mouth every 8 (eight) hours   albuterol (2.5 mg/3 mL) 0.083 % nebulizer solution  Child No No   Sig: Take 3 mL (2.5 mg total) by nebulization every 6 (six) hours as needed for wheezing or shortness of breath   Patient not taking: Reported on 1/17/2025   albuterol (PROVENTIL HFA,VENTOLIN HFA) 90 mcg/act inhaler Not Taking Child No No   Sig: Inhale 2 puffs every 4 (four) hours as needed for wheezing   Patient not taking: Reported on 1/19/2025   apixaban (Eliquis) 5 mg 1/18/2025  No Yes   Sig: Take 1 tablet (5 mg total) by mouth 2 (two) times a day   aspirin 81 mg chewable tablet 1/18/2025 Child Yes Yes   Sig: Chew 81 mg daily   atorvastatin (LIPITOR) 10 mg tablet 1/18/2025 Child Yes Yes   Sig: Take 1 tablet by mouth daily   cyanocobalamin (VITAMIN B-12) 100 MCG tablet 1/18/2025 Child No Yes   Sig: Take 1 tablet (100 mcg total) by mouth daily Do not start before November 15, 2023.   ergocalciferol (VITAMIN D2) 50,000 units 1/18/2025 Child No Yes   Sig: Take 1 capsule (50,000 Units total) by mouth once a week   ferrous sulfate 324 (65 Fe) mg 1/18/2025 Child No Yes   Sig: Take 1 tablet (324 mg total) by mouth daily before breakfast   fludrocortisone (FLORINEF) 0.1 mg tablet 1/18/2025  No Yes   Sig: TAKE 2 TABLETS BY MOUTH DAILY.   furosemide (LASIX) 20 mg tablet 1/18/2025  No Yes   Sig: Take 1 tablet (20 mg total) by mouth if needed (increased LE edema or weight gain)   hydrocortisone (CORTEF) 5 mg tablet 1/18/2025 Child No Yes   Sig: USE 4 TABLETS IN MORNING AND 3 TABLETS DAILY AFTERNOON   insulin glargine (LANTUS) 100 units/mL subcutaneous injection   No No   Sig: Inject 10 Units under the skin daily at bedtime   Patient taking differently: Inject 18 Units under the skin daily at bedtime   insulin glargine (LANTUS) 100 units/mL subcutaneous injection    No Yes   Sig: Inject 22 Units under the skin daily at bedtime   insulin lispro (HumALOG/ADMELOG) 100 units/mL injection   No No   Sig: Inject 2 Units under the skin 3 (three) times a day with meals   Patient taking differently: Inject 8 Units under the skin 3 (three) times a day with meals   insulin lispro (HumALOG/ADMELOG) 100 units/mL injection   No No   Sig: Inject 1-6 Units under the skin 3 (three) times a day before meals   Patient not taking: Reported on 1/17/2025   insulin lispro (HumALOG/ADMELOG) 100 units/mL injection   No No   Sig: Inject 1-6 Units under the skin daily at bedtime   Patient not taking: Reported on 1/17/2025   insulin lispro (HumALOG/ADMELOG) 100 units/mL injection 1/18/2025  No Yes   Sig: Inject 12 Units under the skin 3 (three) times a day with meals   midodrine (PROAMATINE) 5 mg tablet 1/18/2025  No Yes   Sig: Take 1 tablet (5 mg total) by mouth 3 (three) times a day before meals   sertraline (ZOLOFT) 100 mg tablet   Yes No   Sig: Take 100 mg by mouth daily   Patient not taking: Reported on 1/17/2025      Facility-Administered Medications: None     Current Discharge Medication List        CONTINUE these medications which have CHANGED    Details   insulin glargine (LANTUS) 100 units/mL subcutaneous injection Inject 22 Units under the skin daily at bedtime  Qty: 10 mL, Refills: 0    Associated Diagnoses: Type 1 diabetes mellitus with hyperglycemia (HCC)      !! insulin lispro (HumALOG/ADMELOG) 100 units/mL injection Inject 12 Units under the skin 3 (three) times a day with meals  Qty: 10.8 mL, Refills: 0    Comments: Use vials  Associated Diagnoses: Chronic diastolic heart failure (HCC)       !! - Potential duplicate medications found. Please discuss with provider.        CONTINUE these medications which have NOT CHANGED    Details   acetaminophen (TYLENOL) 325 mg tablet Take 3 tablets (975 mg total) by mouth every 8 (eight) hours    Associated Diagnoses: Chronic diastolic heart failure  (HCC)      apixaban (Eliquis) 5 mg Take 1 tablet (5 mg total) by mouth 2 (two) times a day  Qty: 60 tablet, Refills: 0    Comments: DX Code Needed  .  Associated Diagnoses: Type 2 MI (myocardial infarction) (HCC)      aspirin 81 mg chewable tablet Chew 81 mg daily      atorvastatin (LIPITOR) 10 mg tablet Take 1 tablet by mouth daily      Continuous Blood Gluc  (Dexcom G7 ) CYN Use 1 each continuous  Qty: 1 each, Refills: 0    Associated Diagnoses: Type 1 diabetes mellitus with hypoglycemia and without coma (HCC)      Continuous Glucose Sensor (Dexcom G7 Sensor) Use 1 Device every 10 days  Qty: 9 each, Refills: 1    Associated Diagnoses: Type 1 diabetes mellitus with hypoglycemia and without coma (HCC)      cyanocobalamin (VITAMIN B-12) 100 MCG tablet Take 1 tablet (100 mcg total) by mouth daily Do not start before November 15, 2023.  Qty: 30 tablet, Refills: 0    Associated Diagnoses: Vitamin B 12 deficiency      ergocalciferol (VITAMIN D2) 50,000 units Take 1 capsule (50,000 Units total) by mouth once a week  Qty: 12 capsule, Refills: 0    Associated Diagnoses: Vitamin D deficiency      ferrous sulfate 324 (65 Fe) mg Take 1 tablet (324 mg total) by mouth daily before breakfast  Qty: 90 tablet, Refills: 2    Associated Diagnoses: Iron deficiency anemia, unspecified      fludrocortisone (FLORINEF) 0.1 mg tablet TAKE 2 TABLETS BY MOUTH DAILY.  Qty: 60 tablet, Refills: 0    Associated Diagnoses: Adrenal insufficiency (HCC)      furosemide (LASIX) 20 mg tablet Take 1 tablet (20 mg total) by mouth if needed (increased LE edema or weight gain)    Associated Diagnoses: Leg edema      hydrocortisone (CORTEF) 5 mg tablet USE 4 TABLETS IN MORNING AND 3 TABLETS DAILY AFTERNOON  Qty: 210 tablet, Refills: 6    Associated Diagnoses: Adrenal insufficiency (HCC)      !! Insulin Pen Needle (BD Pen Needle Josie 2nd Gen) 32G X 4 MM MISC FOR USE WITH INSULIN PEN. PHARMACY MAY DISPENSE BRAND COVERED BY INSURANCE.  Qty:  100 each, Refills: 5    Comments: DX Code Needed  .  Associated Diagnoses: Insulin dependent type 1 diabetes mellitus (HCC)      !! Insulin Pen Needle (BD Pen Needle Josie 2nd Gen) 32G X 4 MM MISC For use with insulin pen 4 times daily. Pharmacy may dispense brand covered by insurance.  Qty: 200 each, Refills: 3    Associated Diagnoses: Insulin dependent type 1 diabetes mellitus (HCC)      Magnesium Oxide 400 MG CAPS Take 1 tablet (400 mg total) by mouth 2 (two) times a day  Qty: 180 tablet, Refills: 3    Associated Diagnoses: Hypokalemia      midodrine (PROAMATINE) 5 mg tablet Take 1 tablet (5 mg total) by mouth 3 (three) times a day before meals    Associated Diagnoses: Type 1 diabetes mellitus with hyperglycemia (HCC)      albuterol (2.5 mg/3 mL) 0.083 % nebulizer solution Take 3 mL (2.5 mg total) by nebulization every 6 (six) hours as needed for wheezing or shortness of breath    Associated Diagnoses: Chronic diastolic heart failure (HCC)      albuterol (PROVENTIL HFA,VENTOLIN HFA) 90 mcg/act inhaler Inhale 2 puffs every 4 (four) hours as needed for wheezing    Comments: Substitution to a formulary equivalent within the same pharmaceutical class is authorized.  Associated Diagnoses: Chronic diastolic heart failure (HCC)      !! insulin lispro (HumALOG/ADMELOG) 100 units/mL injection Inject 1-6 Units under the skin 3 (three) times a day before meals    Associated Diagnoses: Type 1 diabetes mellitus with hyperglycemia (HCC)      !! insulin lispro (HumALOG/ADMELOG) 100 units/mL injection Inject 1-6 Units under the skin daily at bedtime    Associated Diagnoses: Type 1 diabetes mellitus with hyperglycemia (HCC)      sertraline (ZOLOFT) 100 mg tablet Take 100 mg by mouth daily       !! - Potential duplicate medications found. Please discuss with provider.        No discharge procedures on file.  ED SEPSIS DOCUMENTATION   Time reflects when diagnosis was documented in both MDM as applicable and the Disposition within  this note       Time User Action Codes Description Comment    1/18/2025  8:22 PM Mendel Griffin [R73.9] Hyperglycemia     1/18/2025  8:22 PM Mendel Griffin [E10.65] Type 1 diabetes mellitus with hyperglycemia (HCC)     1/18/2025  8:23 PM Mendel Griffin [I50.32] Chronic diastolic heart failure (HCC)                  Mendel Griffin MD  01/19/25 [id number]

## 2025-01-18 NOTE — ED PROVIDER NOTES
Time reflects when diagnosis was documented in both MDM as applicable and the Disposition within this note       Time User Action Codes Description Comment    1/17/2025 11:20 PM Hector Reyes [E10.65] Type 1 diabetes mellitus with hyperglycemia (HCC)     1/17/2025 11:20 PM Hector Reyes Add [R79.89] Elevated lactic acid level           ED Disposition       ED Disposition   Discharge    Condition   Stable    Date/Time   Fri Jan 17, 2025 11:20 PM    Comment   Karson You discharge to home/self care.                   Assessment & Plan       Medical Decision Making  Patient is a 69-year-old male presenting for evaluation of hyperglycemia    Differential: Will assess for DKA although unlikely.  No infectious signs to suggest underlying etiology for hyperglycemia.  Doubt ACS.  Possibly issue with food intake/insulin administration at facility contributing to poor glycemic control    Plan: DKA labs EKG fluids monitor and reassess    See ED course.  Patient not in DKA    Patient is hemodynamically stable and cleared for discharge with outpatient follow-up.  Return precautions given        Amount and/or Complexity of Data Reviewed  Labs: ordered. Decision-making details documented in ED Course.  ECG/medicine tests:  Decision-making details documented in ED Course.    Risk  OTC drugs.  Prescription drug management.        ED Course as of 01/17/25 2322   Fri Jan 17, 2025 2042 pH, Darvin: 7.347   2054 LACTIC ACID(!): 3.5  Will give 2nd liter, check 2 hr to trend   2055 Beta- Hydroxybutyrate: 0.23   2055 MAGNESIUM(!): 1.8   2055 Potassium: 3.6   2055 Sodium(!): 134   2055 ANION GAP: 9   2055 GLUCOSE(!!): 576  In addition to fluids will give 8.95U insulin (0.1mg/kg)   2055 GFR, Calculated: 60   2055 Phosphorus: 2.6   2252 POC Glucose(!): 326   2300 ECG 12 lead  NSR 75 1st degree AV block no ischemic changes no RYAN or STD nonspecific T wave changes   2321 LACTIC ACID(!): 2.7  Downtrending. On reassessment pt remains  without complaint, is tolerating PO in the ED       Medications   sodium chloride (PF) 0.9 % injection 3 mL (has no administration in time range)   sodium chloride 0.9 % bolus 1,000 mL (0 mL Intravenous Stopped 1/17/25 2220)   sodium chloride 0.9 % bolus 1,000 mL (1,000 mL Intravenous New Bag 1/17/25 2220)   insulin regular (HumuLIN R,NovoLIN R) injection 9 Units (9 Units Intravenous Given 1/17/25 2126)       ED Risk Strat Scores                          SBIRT 20yo+      Flowsheet Row Most Recent Value   Initial Alcohol Screen: US AUDIT-C     1. How often do you have a drink containing alcohol? 0 Filed at: 01/17/2025 1949   2. How many drinks containing alcohol do you have on a typical day you are drinking?  0 Filed at: 01/17/2025 1949   3a. Male UNDER 65: How often do you have five or more drinks on one occasion? 0 Filed at: 01/17/2025 1949   3b. FEMALE Any Age, or MALE 65+: How often do you have 4 or more drinks on one occassion? 0 Filed at: 01/17/2025 1949   Audit-C Score 0 Filed at: 01/17/2025 1949   SUSANNE: How many times in the past year have you...    Used an illegal drug or used a prescription medication for non-medical reasons? Never Filed at: 01/17/2025 1949                            History of Present Illness       Chief Complaint   Patient presents with    Hyperglycemia - no symptoms     Pt arrived via EMS from assisted living facility. Facility reports pt's  and given 4 units. Pt's  in triage. Pt is a poor historian. Pt denies any nausea or complaints of discomfort.       Past Medical History:   Diagnosis Date    A-fib (HCC)     Adrenal insufficiency (HCC)     Atrial fibrillation (HCC)     Diabetes mellitus (HCC)     History of stroke 10/19/2022    Obesity, morbid (HCC) 11/14/2022    Shock (HCC) 12/14/2023    Stroke (HCC)     Type 2 MI (myocardial infarction) (HCC) 11/03/2022      History reviewed. No pertinent surgical history.   Family History   Problem Relation Age of Onset    Heart  "disease Mother     Cancer Father     Multiple sclerosis Sister       Social History     Tobacco Use    Smoking status: Former     Current packs/day: 0.25     Average packs/day: 0.3 packs/day for 30.0 years (7.5 ttl pk-yrs)     Types: Cigarettes    Smokeless tobacco: Never   Vaping Use    Vaping status: Never Used   Substance Use Topics    Alcohol use: Not Currently     Alcohol/week: 5.0 standard drinks of alcohol     Types: 5 Cans of beer per week     Comment: Quit in august 2022    Drug use: Never      E-Cigarette/Vaping    E-Cigarette Use Never User       E-Cigarette/Vaping Substances    Nicotine No     THC No     CBD No     Flavoring No     Other No     Unknown No       I have reviewed and agree with the history as documented.     Patient is a 69-year-old male past medical history of type 1 diabetes on insulin, CVA presenting from facility for evaluation of hyperglycemia.  Patient reports that he was getting his blood sugar checked at the facility and it was \"too high\" so they sent him into the hospital.  Patient is without complaint when asked, he states that other than being told that his sugar was high and being sent here he would not be in the hospital as he feels fine.  No fever chills chest pain shortness of breath cough abdominal pain nausea vomiting diarrhea or any other complaints          Review of Systems   Constitutional:  Negative for chills and fever.   HENT:  Negative for ear pain and sore throat.    Eyes:  Negative for pain and visual disturbance.   Respiratory:  Negative for cough and shortness of breath.    Cardiovascular:  Negative for chest pain and palpitations.   Gastrointestinal:  Negative for abdominal pain and vomiting.   Genitourinary:  Negative for dysuria and hematuria.   Musculoskeletal:  Negative for arthralgias and back pain.   Skin:  Negative for color change and rash.   Neurological:  Negative for seizures and syncope.   All other systems reviewed and are " negative.          Objective       ED Triage Vitals [01/17/25 1949]   Temperature Pulse Blood Pressure Respirations SpO2 Patient Position - Orthostatic VS   98.1 °F (36.7 °C) 78 157/70 18 100 % --      Temp Source Heart Rate Source BP Location FiO2 (%) Pain Score    Oral Monitor -- -- --      Vitals      Date and Time Temp Pulse SpO2 Resp BP Pain Score FACES Pain Rating User   01/17/25 2300 -- 72 99 % 18 143/67 -- -- MA   01/17/25 2200 -- 74 100 % 18 156/65 -- -- MA   01/17/25 2100 -- 69 99 % 18 159/71 -- -- MA   01/17/25 2000 -- 72 100 % 18 173/72 -- -- MA   01/17/25 1949 98.1 °F (36.7 °C) 78 100 % 18 157/70 -- -- MA            Physical Exam  Vitals and nursing note reviewed.   Constitutional:       General: He is not in acute distress.     Appearance: He is well-developed. He is not ill-appearing.   HENT:      Head: Normocephalic and atraumatic.      Mouth/Throat:      Mouth: Mucous membranes are moist.   Eyes:      Extraocular Movements: Extraocular movements intact.      Conjunctiva/sclera: Conjunctivae normal.   Cardiovascular:      Rate and Rhythm: Normal rate and regular rhythm.      Heart sounds: No murmur heard.  Pulmonary:      Effort: Pulmonary effort is normal. No respiratory distress.      Breath sounds: Normal breath sounds.   Abdominal:      Palpations: Abdomen is soft.      Tenderness: There is no abdominal tenderness.   Musculoskeletal:         General: Normal range of motion.      Cervical back: Normal range of motion and neck supple.      Right lower leg: No edema.      Left lower leg: No edema.   Skin:     General: Skin is warm and dry.      Capillary Refill: Capillary refill takes less than 2 seconds.   Neurological:      General: No focal deficit present.      Mental Status: He is alert.         Results Reviewed       Procedure Component Value Units Date/Time    Lactic acid 2 Hours [[id number]]  (Abnormal) Collected: 01/17/25 2250    Lab Status: Final result Specimen: Blood from Arm, Left  Updated: 01/17/25 2311     LACTIC ACID 2.7 mmol/L     Narrative:      Result may be elevated if tourniquet was used during collection.    Fingerstick Glucose (POCT) [[id number]]  (Abnormal) Collected: 01/17/25 2249    Lab Status: Final result Specimen: Blood Updated: 01/17/25 2249     POC Glucose 326 mg/dl     Fingerstick Glucose (POCT) [[id number]]  (Abnormal) Collected: 01/17/25 2125    Lab Status: Final result Specimen: Blood Updated: 01/17/25 2126     POC Glucose 450 mg/dl     Comprehensive metabolic panel [[id number]]  (Abnormal) Collected: 01/17/25 2022    Lab Status: Final result Specimen: Blood from Arm, Left Updated: 01/17/25 2055     Sodium 134 mmol/L      Potassium 3.6 mmol/L      Chloride 99 mmol/L      CO2 26 mmol/L      ANION GAP 9 mmol/L      BUN 19 mg/dL      Creatinine 1.23 mg/dL      Glucose 576 mg/dL      Calcium 7.8 mg/dL      Corrected Calcium 8.4 mg/dL      AST 29 U/L      ALT 26 U/L      Alkaline Phosphatase 110 U/L      Total Protein 6.1 g/dL      Albumin 3.3 g/dL      Total Bilirubin 0.53 mg/dL      eGFR 60 ml/min/1.73sq m     Narrative:      National Kid[employer] Foundation guidelines for Chronic Kidney Disease (CKD):     Stage 1 with normal or high GFR (GFR > 90 mL/min/1.73 square meters)    Stage 2 Mild CKD (GFR = 60-89 mL/min/1.73 square meters)    Stage 3A Moderate CKD (GFR = 45-59 mL/min/1.73 square meters)    Stage 3B Moderate CKD (GFR = 30-44 mL/min/1.73 square meters)    Stage 4 Severe CKD (GFR = 15-29 mL/min/1.73 square meters)    Stage 5 End Stage CKD (GFR <15 mL/min/1.73 square meters)  Note: GFR calculation is accurate only with a steady state creatinine    Beta Hydroxybutyrate [[id number]]  (Normal) Collected: 01/17/25 2022    Lab Status: Final result Specimen: Blood from Arm, Left Updated: 01/17/25 2053     Beta- Hydroxybutyrate 0.23 mmol/L     Magnesium [[id number]]  (Abnormal) Collected: 01/17/25 2022    Lab Status: Final result Specimen: Blood from Arm, Left Updated: 01/17/25  2053     Magnesium 1.8 mg/dL     Phosphorus [[id number]]  (Normal) Collected: 01/17/25 2022    Lab Status: Final result Specimen: Blood from Arm, Left Updated: 01/17/25 2053     Phosphorus 2.6 mg/dL     Lactic acid, plasma (w/reflex if result > 2.0) [[id number]]  (Abnormal) Collected: 01/17/25 2022    Lab Status: Final result Specimen: Blood from Arm, Left Updated: 01/17/25 2052     LACTIC ACID 3.5 mmol/L     Narrative:      Result may be elevated if tourniquet was used during collection.    Blood gas, venous [[id number]]  (Abnormal) Collected: 01/17/25 2022    Lab Status: Final result Specimen: Blood from Arm, Left Updated: 01/17/25 2033     pH, Darvin 7.347     pCO2, Darvin 46.6 mm Hg      pO2, Darvin 29.4 mm Hg      HCO3, Darvin 25.0 mmol/L      Base Excess, Darvin -0.9 mmol/L      O2 Content, Darvin 8.7 ml/dL      O2 HGB, VENOUS 53.8 %     CBC [[id number]]  (Abnormal) Collected: 01/17/25 2022    Lab Status: Final result Specimen: Blood from Arm, Left Updated: 01/17/25 2029     WBC 9.23 Thousand/uL      RBC 3.49 Million/uL      Hemoglobin 11.0 g/dL      Hematocrit 34.5 %      MCV 99 fL      MCH 31.5 pg      MCHC 31.9 g/dL      RDW 14.6 %      Platelets 242 Thousands/uL      MPV 9.9 fL     Fingerstick Glucose (POCT) [[id number]]  (Abnormal) Collected: 01/17/25 1946    Lab Status: Final result Specimen: Blood Updated: 01/17/25 1947     POC Glucose 497 mg/dl             No orders to display       Procedures    ED Medication and Procedure Management   Prior to Admission Medications   Prescriptions Last Dose Informant Patient Reported? Taking?   Continuous Blood Gluc  (Dexcom G7 ) CYN  Child No Yes   Sig: Use 1 each continuous   Continuous Glucose Sensor (Dexcom G7 Sensor)   No Yes   Sig: Use 1 Device every 10 days   Insulin Pen Needle (BD Pen Needle Josie 2nd Gen) 32G X 4 MM MISC  Child No Yes   Sig: FOR USE WITH INSULIN PEN. PHARMACY MAY DISPENSE BRAND COVERED BY INSURANCE.   Insulin Pen Needle (BD Pen Needle Josie 2nd Gen)  32G X 4 MM MISC   No Yes   Sig: For use with insulin pen 4 times daily. Pharmacy may dispense brand covered by insurance.   Magnesium Oxide 400 MG CAPS  Child No Yes   Sig: Take 1 tablet (400 mg total) by mouth 2 (two) times a day   acetaminophen (TYLENOL) 325 mg tablet  Child No Yes   Sig: Take 3 tablets (975 mg total) by mouth every 8 (eight) hours   albuterol (2.5 mg/3 mL) 0.083 % nebulizer solution Not Taking Child No No   Sig: Take 3 mL (2.5 mg total) by nebulization every 6 (six) hours as needed for wheezing or shortness of breath   Patient not taking: Reported on 1/17/2025   albuterol (PROVENTIL HFA,VENTOLIN HFA) 90 mcg/act inhaler  Child No Yes   Sig: Inhale 2 puffs every 4 (four) hours as needed for wheezing   apixaban (Eliquis) 5 mg   No Yes   Sig: Take 1 tablet (5 mg total) by mouth 2 (two) times a day   aspirin 81 mg chewable tablet  Child Yes Yes   Sig: Chew 81 mg daily   atorvastatin (LIPITOR) 10 mg tablet  Child Yes Yes   Sig: Take 1 tablet by mouth daily   cyanocobalamin (VITAMIN B-12) 100 MCG tablet  Child No Yes   Sig: Take 1 tablet (100 mcg total) by mouth daily Do not start before November 15, 2023.   ergocalciferol (VITAMIN D2) 50,000 units  Child No Yes   Sig: Take 1 capsule (50,000 Units total) by mouth once a week   ferrous sulfate 324 (65 Fe) mg  Child No Yes   Sig: Take 1 tablet (324 mg total) by mouth daily before breakfast   fludrocortisone (FLORINEF) 0.1 mg tablet   No Yes   Sig: TAKE 2 TABLETS BY MOUTH DAILY.   furosemide (LASIX) 20 mg tablet   No No   Sig: Take 1 tablet (20 mg total) by mouth if needed (increased LE edema or weight gain)   hydrocortisone (CORTEF) 5 mg tablet  Child No No   Sig: USE 4 TABLETS IN MORNING AND 3 TABLETS DAILY AFTERNOON   insulin glargine (LANTUS) 100 units/mL subcutaneous injection   No Yes   Sig: Inject 10 Units under the skin daily at bedtime   Patient taking differently: Inject 18 Units under the skin daily at bedtime   insulin lispro (HumALOG/ADMELOG)  100 units/mL injection   No Yes   Sig: Inject 2 Units under the skin 3 (three) times a day with meals   Patient taking differently: Inject 8 Units under the skin 3 (three) times a day with meals   insulin lispro (HumALOG/ADMELOG) 100 units/mL injection Not Taking  No No   Sig: Inject 1-6 Units under the skin 3 (three) times a day before meals   Patient not taking: Reported on 1/17/2025   insulin lispro (HumALOG/ADMELOG) 100 units/mL injection Not Taking  No No   Sig: Inject 1-6 Units under the skin daily at bedtime   Patient not taking: Reported on 1/17/2025   midodrine (PROAMATINE) 5 mg tablet   No Yes   Sig: Take 1 tablet (5 mg total) by mouth 3 (three) times a day before meals   sertraline (ZOLOFT) 100 mg tablet Not Taking  Yes No   Sig: Take 100 mg by mouth daily   Patient not taking: Reported on 1/17/2025      Facility-Administered Medications: None     Patient's Medications   Discharge Prescriptions    No medications on file     No discharge procedures on file.  ED SEPSIS DOCUMENTATION   Time reflects when diagnosis was documented in both MDM as applicable and the Disposition within this note       Time User Action Codes Description Comment    1/17/2025 11:20 PM Hector Reyes [E10.65] Type 1 diabetes mellitus with hyperglycemia (HCC)     1/17/2025 11:20 PM Hector Reyes [R79.89] Elevated lactic acid level                  Hector Reyes,   01/18/25 5986

## 2025-01-18 NOTE — ED NOTES
This RN talked to pt's daughter and provided an updated plan of care.     Tiffany Rivera RN  01/17/25 8325

## 2025-01-18 NOTE — ED NOTES
This RN talked to pt's daughter and provided an update regarding plan of care.     Tiffany Rivera RN  01/17/25 3165

## 2025-01-19 LAB
ANION GAP SERPL CALCULATED.3IONS-SCNC: 5 MMOL/L (ref 4–13)
BUN SERPL-MCNC: 11 MG/DL (ref 5–25)
CALCIUM SERPL-MCNC: 7.6 MG/DL (ref 8.4–10.2)
CHLORIDE SERPL-SCNC: 109 MMOL/L (ref 96–108)
CO2 SERPL-SCNC: 26 MMOL/L (ref 21–32)
CREAT SERPL-MCNC: 0.91 MG/DL (ref 0.6–1.3)
ERYTHROCYTE [DISTWIDTH] IN BLOOD BY AUTOMATED COUNT: 14.5 % (ref 11.6–15.1)
EST. AVERAGE GLUCOSE BLD GHB EST-MCNC: 192 MG/DL
GFR SERPL CREATININE-BSD FRML MDRD: 86 ML/MIN/1.73SQ M
GLUCOSE SERPL-MCNC: 135 MG/DL (ref 65–140)
GLUCOSE SERPL-MCNC: 170 MG/DL (ref 65–140)
GLUCOSE SERPL-MCNC: 207 MG/DL (ref 65–140)
GLUCOSE SERPL-MCNC: 59 MG/DL (ref 65–140)
GLUCOSE SERPL-MCNC: 60 MG/DL (ref 65–140)
GLUCOSE SERPL-MCNC: 87 MG/DL (ref 65–140)
GLUCOSE SERPL-MCNC: 90 MG/DL (ref 65–140)
HBA1C MFR BLD: 8.3 %
HCT VFR BLD AUTO: 30.5 % (ref 36.5–49.3)
HGB BLD-MCNC: 10.1 G/DL (ref 12–17)
MCH RBC QN AUTO: 31.6 PG (ref 26.8–34.3)
MCHC RBC AUTO-ENTMCNC: 33.1 G/DL (ref 31.4–37.4)
MCV RBC AUTO: 95 FL (ref 82–98)
PLATELET # BLD AUTO: 199 THOUSANDS/UL (ref 149–390)
PMV BLD AUTO: 9.4 FL (ref 8.9–12.7)
POTASSIUM SERPL-SCNC: 2.7 MMOL/L (ref 3.5–5.3)
POTASSIUM SERPL-SCNC: 3.4 MMOL/L (ref 3.5–5.3)
RBC # BLD AUTO: 3.2 MILLION/UL (ref 3.88–5.62)
SODIUM SERPL-SCNC: 140 MMOL/L (ref 135–147)
WBC # BLD AUTO: 9.96 THOUSAND/UL (ref 4.31–10.16)

## 2025-01-19 PROCEDURE — 80048 BASIC METABOLIC PNL TOTAL CA: CPT

## 2025-01-19 PROCEDURE — 83036 HEMOGLOBIN GLYCOSYLATED A1C: CPT

## 2025-01-19 PROCEDURE — 82948 REAGENT STRIP/BLOOD GLUCOSE: CPT

## 2025-01-19 PROCEDURE — 85027 COMPLETE CBC AUTOMATED: CPT

## 2025-01-19 PROCEDURE — 99232 SBSQ HOSP IP/OBS MODERATE 35: CPT | Performed by: INTERNAL MEDICINE

## 2025-01-19 PROCEDURE — 84132 ASSAY OF SERUM POTASSIUM: CPT | Performed by: INTERNAL MEDICINE

## 2025-01-19 RX ORDER — ONDANSETRON 2 MG/ML
4 INJECTION INTRAMUSCULAR; INTRAVENOUS EVERY 6 HOURS PRN
Status: DISCONTINUED | OUTPATIENT
Start: 2025-01-19 | End: 2025-01-21 | Stop reason: HOSPADM

## 2025-01-19 RX ORDER — INSULIN LISPRO 100 [IU]/ML
8 INJECTION, SOLUTION INTRAVENOUS; SUBCUTANEOUS
Status: DISCONTINUED | OUTPATIENT
Start: 2025-01-19 | End: 2025-01-19

## 2025-01-19 RX ORDER — INSULIN GLARGINE 100 [IU]/ML
18 INJECTION, SOLUTION SUBCUTANEOUS
Status: DISCONTINUED | OUTPATIENT
Start: 2025-01-19 | End: 2025-01-21 | Stop reason: HOSPADM

## 2025-01-19 RX ORDER — HYDROCORTISONE 10 MG/1
20 TABLET ORAL EVERY MORNING
Status: DISCONTINUED | OUTPATIENT
Start: 2025-01-19 | End: 2025-01-21 | Stop reason: HOSPADM

## 2025-01-19 RX ORDER — FLUDROCORTISONE ACETATE 0.1 MG/1
0.2 TABLET ORAL DAILY
Status: DISCONTINUED | OUTPATIENT
Start: 2025-01-19 | End: 2025-01-21 | Stop reason: HOSPADM

## 2025-01-19 RX ORDER — ATORVASTATIN CALCIUM 10 MG/1
10 TABLET, FILM COATED ORAL DAILY
Status: DISCONTINUED | OUTPATIENT
Start: 2025-01-19 | End: 2025-01-19

## 2025-01-19 RX ORDER — MAGNESIUM SULFATE HEPTAHYDRATE 40 MG/ML
2 INJECTION, SOLUTION INTRAVENOUS ONCE
Status: COMPLETED | OUTPATIENT
Start: 2025-01-19 | End: 2025-01-19

## 2025-01-19 RX ORDER — INSULIN LISPRO 100 [IU]/ML
6 INJECTION, SOLUTION INTRAVENOUS; SUBCUTANEOUS
Status: DISCONTINUED | OUTPATIENT
Start: 2025-01-19 | End: 2025-01-21 | Stop reason: HOSPADM

## 2025-01-19 RX ORDER — ASPIRIN 81 MG/1
81 TABLET, CHEWABLE ORAL DAILY
Status: DISCONTINUED | OUTPATIENT
Start: 2025-01-19 | End: 2025-01-21 | Stop reason: HOSPADM

## 2025-01-19 RX ORDER — INSULIN GLARGINE 100 [IU]/ML
22 INJECTION, SOLUTION SUBCUTANEOUS
Status: DISCONTINUED | OUTPATIENT
Start: 2025-01-19 | End: 2025-01-19

## 2025-01-19 RX ORDER — MAGNESIUM HYDROXIDE/ALUMINUM HYDROXICE/SIMETHICONE 120; 1200; 1200 MG/30ML; MG/30ML; MG/30ML
30 SUSPENSION ORAL EVERY 6 HOURS PRN
Status: DISCONTINUED | OUTPATIENT
Start: 2025-01-19 | End: 2025-01-21 | Stop reason: HOSPADM

## 2025-01-19 RX ORDER — INSULIN LISPRO 100 [IU]/ML
12 INJECTION, SOLUTION INTRAVENOUS; SUBCUTANEOUS
Status: DISCONTINUED | OUTPATIENT
Start: 2025-01-19 | End: 2025-01-19

## 2025-01-19 RX ORDER — POTASSIUM CHLORIDE 1500 MG/1
40 TABLET, EXTENDED RELEASE ORAL ONCE
Status: COMPLETED | OUTPATIENT
Start: 2025-01-19 | End: 2025-01-19

## 2025-01-19 RX ORDER — ENOXAPARIN SODIUM 100 MG/ML
40 INJECTION SUBCUTANEOUS DAILY
Status: DISCONTINUED | OUTPATIENT
Start: 2025-01-19 | End: 2025-01-19

## 2025-01-19 RX ORDER — POLYETHYLENE GLYCOL 3350 17 G/17G
17 POWDER, FOR SOLUTION ORAL DAILY PRN
Status: DISCONTINUED | OUTPATIENT
Start: 2025-01-19 | End: 2025-01-21 | Stop reason: HOSPADM

## 2025-01-19 RX ORDER — MIDODRINE HYDROCHLORIDE 5 MG/1
5 TABLET ORAL
Status: DISCONTINUED | OUTPATIENT
Start: 2025-01-19 | End: 2025-01-21 | Stop reason: HOSPADM

## 2025-01-19 RX ORDER — ACETAMINOPHEN 325 MG/1
975 TABLET ORAL EVERY 8 HOURS PRN
Status: DISCONTINUED | OUTPATIENT
Start: 2025-01-19 | End: 2025-01-21 | Stop reason: HOSPADM

## 2025-01-19 RX ADMIN — ASPIRIN 81 MG CHEWABLE TABLET 81 MG: 81 TABLET CHEWABLE at 08:51

## 2025-01-19 RX ADMIN — FLUDROCORTISONE ACETATE 0.2 MG: 0.1 TABLET ORAL at 08:53

## 2025-01-19 RX ADMIN — MIDODRINE HYDROCHLORIDE 5 MG: 5 TABLET ORAL at 16:25

## 2025-01-19 RX ADMIN — HYDROCORTISONE 15 MG: 10 TABLET ORAL at 14:24

## 2025-01-19 RX ADMIN — APIXABAN 5 MG: 5 TABLET, FILM COATED ORAL at 08:52

## 2025-01-19 RX ADMIN — MAGNESIUM SULFATE HEPTAHYDRATE 2 G: 40 INJECTION, SOLUTION INTRAVENOUS at 08:56

## 2025-01-19 RX ADMIN — HYDROCORTISONE 20 MG: 10 TABLET ORAL at 08:53

## 2025-01-19 RX ADMIN — POTASSIUM CHLORIDE 40 MEQ: 1500 TABLET, EXTENDED RELEASE ORAL at 08:52

## 2025-01-19 RX ADMIN — APIXABAN 5 MG: 5 TABLET, FILM COATED ORAL at 21:32

## 2025-01-19 RX ADMIN — INSULIN LISPRO 6 UNITS: 100 INJECTION, SOLUTION INTRAVENOUS; SUBCUTANEOUS at 16:24

## 2025-01-19 RX ADMIN — INSULIN LISPRO 12 UNITS: 100 INJECTION, SOLUTION INTRAVENOUS; SUBCUTANEOUS at 08:55

## 2025-01-19 RX ADMIN — ATORVASTATIN CALCIUM 10 MG: 10 TABLET, FILM COATED ORAL at 16:25

## 2025-01-19 RX ADMIN — INSULIN GLARGINE 18 UNITS: 100 INJECTION, SOLUTION SUBCUTANEOUS at 21:32

## 2025-01-19 RX ADMIN — POTASSIUM CHLORIDE 40 MEQ: 1500 TABLET, EXTENDED RELEASE ORAL at 19:34

## 2025-01-19 NOTE — ASSESSMENT & PLAN NOTE
Outpatient regimen: hydrocortisone 20mg AM/15mg PM, Florinef 0.2mg daily  Follows with [facility] endocrinology

## 2025-01-19 NOTE — QUICK NOTE
Per outpatient endocrinology office note, patient has brittle diabetes  Patient's daughter notes his blood sugar drops precipitously  Blood sugar 59: Patient given juice, recheck  Will hold prelunch standing Humalog, and decrease dinner dose  Decrease Lantus  Will continue to monitor closely and titrate

## 2025-01-19 NOTE — ASSESSMENT & PLAN NOTE
Noted   Outpatient regimen: hydrocortisone 20mg AM/15mg PM, Florinef 0.2mg daily     Plan:  Continue outpatient regimen

## 2025-01-19 NOTE — ASSESSMENT & PLAN NOTE
Wt Readings from Last 3 Encounters:   01/17/25 89.5 kg (197 lb 5 oz)   09/05/24 77.8 kg (171 lb 9.6 oz)   06/27/24 80.5 kg (177 lb 6.4 oz)

## 2025-01-19 NOTE — PLAN OF CARE
Problem: Potential for Falls  Goal: Patient will remain free of falls  Description: INTERVENTIONS:  - Educate patient/family on patient safety including physical limitations  - Instruct patient to call for assistance with activity   - Consult OT/PT to assist with strengthening/mobility   - Keep Call bell within reach  - Keep bed low and locked with side rails adjusted as appropriate  - Keep care items and personal belongings within reach  - Initiate and maintain comfort rounds  - Make Fall Risk Sign visible to staff  - Offer Toileting every 2 Hours, in advance of need  - Initiate/Maintain be dalarm  - Obtain necessary fall risk management equipment: alarm   - Apply yellow socks and bracelet for high fall risk patients  - Consider moving patient to room near nurses station  Outcome: Progressing     Problem: Prexisting or High Potential for Compromised Skin Integrity  Goal: Skin integrity is maintained or improved  Description: INTERVENTIONS:  - Identify patients at risk for skin breakdown  - Assess and monitor skin integrity  - Assess and monitor nutrition and hydration status  - Monitor labs   - Assess for incontinence   - Turn and reposition patient  - Assist with mobility/ambulation  - Relieve pressure over bony prominences  - Avoid friction and shearing  - Provide appropriate hygiene as needed including keeping skin clean and dry  - Evaluate need for skin moisturizer/barrier cream  - Collaborate with interdisciplinary team   - Patient/family teaching  - Consider wound care consult   Outcome: Progressing     Problem: PAIN - ADULT  Goal: Verbalizes/displays adequate comfort level or baseline comfort level  Description: Interventions:  - Encourage patient to monitor pain and request assistance  - Assess pain using appropriate pain scale  - Administer analgesics based on type and severity of pain and evaluate response  - Implement non-pharmacological measures as appropriate and evaluate response  - Consider  cultural and social influences on pain and pain management  - Notify physician/advanced practitioner if interventions unsuccessful or patient reports new pain  Outcome: Progressing     Problem: INFECTION - ADULT  Goal: Absence or prevention of progression during hospitalization  Description: INTERVENTIONS:  - Assess and monitor for signs and symptoms of infection  - Monitor lab/diagnostic results  - Monitor all insertion sites, i.e. indwelling lines, tubes, and drains  - Monitor endotracheal if appropriate and nasal secretions for changes in amount and color  - [place] appropriate cooling/warming therapies per order  - Administer medications as ordered  - Instruct and encourage patient and family to use good hand hygiene technique  - Identify and instruct in appropriate isolation precautions for identified infection/condition  Outcome: Progressing     Problem: SAFETY ADULT  Goal: Patient will remain free of falls  Description: INTERVENTIONS:  - Educate patient/family on patient safety including physical limitations  - Instruct patient to call for assistance with activity   - Consult OT/PT to assist with strengthening/mobility   - Keep Call bell within reach  - Keep bed low and locked with side rails adjusted as appropriate  - Keep care items and personal belongings within reach  - Initiate and maintain comfort rounds  - Make Fall Risk Sign visible to staff  - Offer Toileting every 2 Hours, in advance of need  - Initiate/Maintain bed alarm  - Obtain necessary fall risk management equipment: alarm   - Apply yellow socks and bracelet for high fall risk patients  - Consider moving patient to room near nurses station  Outcome: Progressing  Goal: Maintain or return to baseline ADL function  Description: INTERVENTIONS:  -  Assess patient's ability to carry out ADLs; assess patient's baseline for ADL function and identify physical deficits which impact ability to perform ADLs (bathing, care of mouth/teeth, toileting,  grooming, dressing, etc.)  - Assess/evaluate cause of self-care deficits   - Assess range of motion  - Assess patient's mobility; develop plan if impaired  - Assess patient's need for assistive devices and provide as appropriate  - Encourage maximum independence but intervene and supervise when necessary  - Involve family in performance of ADLs  - Assess for home care needs following discharge   - Consider OT consult to assist with ADL evaluation and planning for discharge  - Provide patient education as appropriate  Outcome: Progressing  Goal: Maintains/Returns to pre admission functional level  Description: INTERVENTIONS:  - Perform AM-PAC 6 Click Basic Mobility/ Daily Activity assessment daily.  - Set and communicate daily mobility goal to care team and patient/family/caregiver.   - Collaborate with rehabilitation services on mobility goals if consulted  - Perform Range of Motion 3 times a day.  - Reposition patient every 2 hours.  - Dangle patient 3 times a day  - Stand patient 3 times a day  - Ambulate patient 3 times a day  - Out of bed to chair 3 times a day   - Out of bed for meals 3 times a day  - Out of bed for toileting  - Record patient progress and toleration of activity level   Outcome: Progressing     Problem: DISCHARGE PLANNING  Goal: Discharge to home or other facility with appropriate resources  Description: INTERVENTIONS:  - Identify barriers to discharge w/patient and caregiver  - Arrange for needed discharge resources and transportation as appropriate  - Identify discharge learning needs (meds, wound care, etc.)  - Arrange for interpretive services to assist at discharge as needed  - Refer to Case Management Department for coordinating discharge planning if the patient needs post-hospital services based on physician/advanced practitioner order or complex needs related to functional status, cognitive ability, or social support system  Outcome: Progressing     Problem: Knowledge Deficit  Goal:  Patient/family/caregiver demonstrates understanding of disease process, treatment plan, medications, and discharge instructions  Description: Complete learning assessment and assess knowledge base.  Interventions:  - Provide teaching at level of understanding  - Provide teaching via preferred learning methods  Outcome: Progressing

## 2025-01-19 NOTE — ASSESSMENT & PLAN NOTE
Noted with history of falls and syncopal episodes, recently admitted for same  Outpatient regimen: midodrine 5mg TID     Plan:  Continue midodrine with hold parameters  Compression stockings, abdominal binders when ambulating   Fall precautions   At [facility] [place] [facility] for rehab

## 2025-01-19 NOTE — ASSESSMENT & PLAN NOTE
Wt Readings from Last 3 Encounters:   01/19/25 87.1 kg (192 lb 0.3 oz)   01/17/25 89.5 kg (197 lb 5 oz)   09/05/24 77.8 kg (171 lb 9.6 oz)   Most recent 2D echocardiogram 12/23: Left ventricular ejection fraction 60%.  Normal diastolic function.  No significant valvular heart disease  Clinically euvolemic  Not on maintenance diuretics, was on Lasix 20 mg daily as needed

## 2025-01-19 NOTE — ASSESSMENT & PLAN NOTE
Lab Results   Component Value Date    HGBA1C 7.0 (H) 06/13/2024     Recent Labs     01/18/25 2015 01/18/25  2115 01/18/25  2233 01/19/25  0812   POCGLU 325* 348* 370* 90   Blood Sugar Average: Last 72 hrs:  (P) 364    Patient is a 69-year-old male with type 1 diabetes, who was sent to the ER for hyperglycemia from [facility]    Initial blood sugar in the ER was in the 500s  Per outpatient endocrinology notes, patient has a history of type 1 diabetes, extremely brittle   Apparently for be discontinued his sliding scale insulin due to hypoglycemia   Outpatient regimen: lantus 10u HS, humalog 10u 3 times daily AC & SSI   ED discussed with on-call endocrinology who recommended 22u Lantus HS and humalog 12u AC  Patient's hyperglycemia has improved  Discussed with patient daughter who notes he has very labile blood sugars with precipitants of hypoglycemic spells: Will recheck blood sugar now and monitor closely  Continue current regimen, Accu-Cheks, monitor and titrate as needed

## 2025-01-19 NOTE — ASSESSMENT & PLAN NOTE
Lab Results   Component Value Date    HGBA1C 7.0 (H) 06/13/2024     Recent Labs     01/18/25  1845 01/18/25 2015 01/18/25 2115 01/18/25  2233   POCGLU 333* 325* 348* 370*   Blood Sugar Average: Last 72 hrs:  (P) [id number]    Presents with hyperglycemia, apparently phoebe stopped SSI due to hypoglycemic episodes, history of brittle diabetes & difficult control due to chronic steroid use   Outpatient regimen: lantus 10u HS, humalog 10u AC & SSI    Plan:  BG goal 200s due to brittle diabetic history   ED discussed with on-call endocrinology who recommended 22u Lantus HS and humalog 12u AC  Holding oral antihyperglycemics

## 2025-01-19 NOTE — ASSESSMENT & PLAN NOTE
Was admitted 1/7/2025 with a fall and positive orthostatic hypotension  His previous midodrine was restarted at 5 mg 3 times daily  Patient has a history of orthostatic hypotension and follows with [facility] nephrology:  Prior 6/24 office note: Suspected to be secondary to autonomic dysfunction in the setting of type 1 diabetes  Patient also has adrenal insufficiency  (In the past patient was also on salt tablets: Stopped 6/2024 by cardiology)  (Prior midodrine dose had been as high as 15 mg 3 times daily)  Patient is also on Florinef and hydrocortisone for adrenal insufficiency  Compression stockings, abdominal binders when ambulating   Fall precautions   [place] [facility] [place]

## 2025-01-19 NOTE — H&P
H&P - Hospitalist   Name: Karson You 69 y.o. male I MRN: [id number]  Unit/Bed#: ED-12 I Date of Admission: 1/18/2025   Date of Service: 1/18/2025 I Hospital Day: 0     Assessment & Plan  Type 1 diabetes mellitus with hyperglycemia (HCC)  Lab Results   Component Value Date    HGBA1C 7.0 (H) 06/13/2024     Recent Labs     01/18/25  1845 01/18/25 2015 01/18/25 2115 01/18/25  2233   POCGLU 333* 325* 348* 370*   Blood Sugar Average: Last 72 hrs:  (P) [id number]    Presents with hyperglycemia, apparently phoebe stopped SSI due to hypoglycemic episodes, history of brittle diabetes & difficult control due to chronic steroid use   Outpatient regimen: lantus 10u HS, humalog 10u AC & SSI    Plan:  BG goal 200s due to brittle diabetic history   ED discussed with on-call endocrinology who recommended 22u Lantus HS and humalog 12u AC  Holding oral antihyperglycemics  Paroxysmal atrial fibrillation   Rate controlled, no PTA beta-blockers, continue Eliquis   Adrenal insufficiency (HCC)  Noted   Outpatient regimen: hydrocortisone 20mg AM/15mg PM, Florinef 0.2mg daily     Plan:  Continue outpatient regimen  Orthostatic hypotension  Noted with history of falls and syncopal episodes, recently admitted for same  Outpatient regimen: midodrine 5mg TID     Plan:  Continue midodrine with hold parameters  Compression stockings, abdominal binders when ambulating   Fall precautions   At [facility] [place] for rehab     VTE Pharmacologic Prophylaxis: VTE Score: 3 Moderate Risk (Score 3-4) - Pharmacological DVT Prophylaxis Ordered: enoxaparin (Lovenox).  Code Status: Prior   Discussion with family: ED discussed with daughter  Anticipated Length of Stay: Patient will be admitted on an observation basis with an anticipated length of stay of less than 2 midnights secondary to hyperglycemia.    History of Present Illness   Chief Complaint:   Chief Complaint   Patient presents with    Hyperglycemia - no symptoms     EMS from [facility] reading  "\"high\". Was here overnight for same. Denies symptoms.      Karson You is a 69 y.o. male with a PMH of CVA, PAF, HFpEF, adrenal insufficiency, T1DM who presents with hyperglycemia.   History obtained from patient, chart review and discussion with ED attending. Presents tonight for evaluation of high blood sugar readings at rehab facility. Daughter states he has a history of brittle diabetes with episodes of hypoglycemia. Appears he was recently discharged on lantus 10u nightly, humalog 10u with meals and a sliding scale regimen. He apparently had episode of hypoglycemia at assisted living and the regimen was adjusted but is now hyperglycemic. He otherwise has no complaints.     Review of Systems   Constitutional:  Negative for chills and fever.   Respiratory:  Negative for cough and shortness of breath.    Cardiovascular:  Negative for chest pain and palpitations.   Gastrointestinal:  Negative for abdominal pain, diarrhea, nausea and vomiting.   Neurological:  Negative for syncope.   All other systems reviewed and are negative.      Historical Information   Past Medical History:   Diagnosis Date    A-fib (HCC)     Adrenal insufficiency (HCC)     Atrial fibrillation (HCC)     Diabetes mellitus (HCC)     History of stroke 10/19/2022    Obesity, morbid (HCC) 11/14/2022    Shock (HCC) 12/14/2023    Stroke (HCC)     Type 2 MI (myocardial infarction) (HCC) 11/03/2022     History reviewed. No pertinent surgical history.  Social History     Tobacco Use    Smoking status: Former     Current packs/day: 0.25     Average packs/day: 0.3 packs/day for 30.0 years (7.5 ttl pk-yrs)     Types: Cigarettes    Smokeless tobacco: Never   Vaping Use    Vaping status: Never Used   Substance and Sexual Activity    Alcohol use: Not Currently     Alcohol/week: 5.0 standard drinks of alcohol     Types: 5 Cans of beer per week     Comment: Quit in august 2022    Drug use: Never    Sexual activity: Not Currently     E-Cigarette/Vaping    " E-Cigarette Use Never User      E-Cigarette/Vaping Substances    Nicotine No     THC No     CBD No     Flavoring No     Other No     Unknown No      Family History   Problem Relation Age of Onset    Heart disease Mother     Cancer Father     Multiple sclerosis Sister      Social History:  Marital Status: Single   Occupation:   Patient Pre-hospital Living Situation: Home  Patient Pre-hospital Level of Mobility: walks  Patient Pre-hospital Diet Restrictions:     Meds/Allergies   I have reviewed home medications with patient personally.  Prior to Admission medications    Medication Sig Start Date End Date Taking? Authorizing Provider   insulin glargine (LANTUS) 100 units/mL subcutaneous injection Inject 22 Units under the skin daily at bedtime 1/18/25 3/4/25 Yes Mendel Griffin MD   insulin lispro (HumALOG/ADMELOG) 100 units/mL injection Inject 12 Units under the skin 3 (three) times a day with meals 1/18/25 2/17/25 Yes Mendel Griffin MD   acetaminophen (TYLENOL) 325 mg tablet Take 3 tablets (975 mg total) by mouth every 8 (eight) hours 12/21/23   Carmenza Leblanc PA-C   albuterol (2.5 mg/3 mL) 0.083 % nebulizer solution Take 3 mL (2.5 mg total) by nebulization every 6 (six) hours as needed for wheezing or shortness of breath  Patient not taking: Reported on 1/17/2025 12/21/23   Carmenza Leblanc PA-C   albuterol (PROVENTIL HFA,VENTOLIN HFA) 90 mcg/act inhaler Inhale 2 puffs every 4 (four) hours as needed for wheezing 12/21/23   Carmenza Leblanc PA-C   apixaban (Eliquis) 5 mg Take 1 tablet (5 mg total) by mouth 2 (two) times a day 11/5/24   Júnior Reza MD   aspirin 81 mg chewable tablet Chew 81 mg daily    Historical Provider, MD   atorvastatin (LIPITOR) 10 mg tablet Take 1 tablet by mouth daily 7/12/22   Historical Provider, MD   Continuous Blood Gluc  (Dexcom G7 ) CYN Use 1 each continuous 10/23/23   Lewis Butts MD   Continuous Glucose Sensor (Dexcom G7 Sensor) Use 1 Device every 10 days  11/4/24   Lewis Butts MD   cyanocobalamin (VITAMIN B-12) 100 MCG tablet Take 1 tablet (100 mcg total) by mouth daily Do not start before November 15, 2023. 11/15/23   Lizet Herron MD   ergocalciferol (VITAMIN D2) 50,000 units Take 1 capsule (50,000 Units total) by mouth once a week 6/27/24   Lloyd Mosqueda MD   ferrous sulfate 324 (65 Fe) mg Take 1 tablet (324 mg total) by mouth daily before breakfast 6/27/24   Lloyd Mosqueda MD   fludrocortisone (FLORINEF) 0.1 mg tablet TAKE 2 TABLETS BY MOUTH DAILY. 1/14/25   Gina Ramirez MD   furosemide (LASIX) 20 mg tablet Take 1 tablet (20 mg total) by mouth if needed (increased LE edema or weight gain) 1/11/25   NACHO Devine   hydrocortisone (CORTEF) 5 mg tablet USE 4 TABLETS IN MORNING AND 3 TABLETS DAILY AFTERNOON 8/16/24   Lewis Butts MD   insulin lispro (HumALOG/ADMELOG) 100 units/mL injection Inject 1-6 Units under the skin 3 (three) times a day before meals  Patient not taking: Reported on 1/17/2025 1/11/25   NACHO Devine   insulin lispro (HumALOG/ADMELOG) 100 units/mL injection Inject 1-6 Units under the skin daily at bedtime  Patient not taking: Reported on 1/17/2025 1/11/25   NACHO Devine   Insulin Pen Needle (BD Pen Needle Josie 2nd Gen) 32G X 4 MM MISC FOR USE WITH INSULIN PEN. PHARMACY MAY DISPENSE BRAND COVERED BY INSURANCE. 12/1/23   Lewis Butts MD   Insulin Pen Needle (BD Pen Needle Josie 2nd Gen) 32G X 4 MM MISC For use with insulin pen 4 times daily. Pharmacy may dispense brand covered by insurance. 12/26/24   Lewis Butts MD   Magnesium Oxide 400 MG CAPS Take 1 tablet (400 mg total) by mouth 2 (two) times a day 6/13/24   NACHO Gonzalez   midodrine (PROAMATINE) 5 mg tablet Take 1 tablet (5 mg total) by mouth 3 (three) times a day before meals 1/11/25   NACHO Devine   sertraline (ZOLOFT) 100 mg tablet Take 100 mg by mouth daily  Patient not taking: Reported on 1/17/2025    Historical  Provider, MD   insulin glargine (LANTUS) 100 units/mL subcutaneous injection Inject 10 Units under the skin daily at bedtime  Patient taking differently: Inject 18 Units under the skin daily at bedtime 1/11/25 1/18/25  NACHO Devine   insulin lispro (HumALOG/ADMELOG) 100 units/mL injection Inject 2 Units under the skin 3 (three) times a day with meals  Patient taking differently: Inject 8 Units under the skin 3 (three) times a day with meals 1/11/25 1/18/25  NACHO Devine     Allergies   Allergen Reactions    Imipramine Other (See Comments)    Lisinopril Other (See Comments)    Paroxetine Other (See Comments)    Penicillins Hives    Rosiglitazone Other (See Comments)    Troglitazone Other (See Comments)       Objective :  Temp:  [98.4 °F (36.9 °C)] 98.4 °F (36.9 °C)  HR:  [73-86] 75  BP: (141-190)/(64-86) 154/73  Resp:  [16-18] 18  SpO2:  [95 %-100 %] 100 %  O2 Device: None (Room air)    Physical Exam  Vitals and nursing note reviewed.   Constitutional:       General: He is not in acute distress.     Appearance: He is well-developed.   HENT:      Head: Normocephalic and atraumatic.   Eyes:      Conjunctiva/sclera: Conjunctivae normal.   Cardiovascular:      Rate and Rhythm: Normal rate and regular rhythm.      Heart sounds: No murmur heard.  Pulmonary:      Effort: Pulmonary effort is normal. No respiratory distress.      Breath sounds: Normal breath sounds.   Abdominal:      Palpations: Abdomen is soft.      Tenderness: There is no abdominal tenderness.   Musculoskeletal:         General: No swelling.      Cervical back: Neck supple.   Skin:     General: Skin is warm and dry.      Capillary Refill: Capillary refill takes less than 2 seconds.   Neurological:      Mental Status: He is alert.   Psychiatric:         Mood and Affect: Mood normal.        Lines/Drains:      Lab Results: I have reviewed the following results:  Results from last 7 days   Lab Units 01/18/25  1507   WBC Thousand/uL 9.58    HEMOGLOBIN g/dL 11.3*   HEMATOCRIT % 34.3*   PLATELETS Thousands/uL 216   SEGS PCT % 84*   LYMPHO PCT % 9*   MONO PCT % 6   EOS PCT % 0     Results from last 7 days   Lab Units 01/18/25  1507   SODIUM mmol/L 134*   POTASSIUM mmol/L 3.6   CHLORIDE mmol/L 100   CO2 mmol/L 23   BUN mg/dL 16   CREATININE mg/dL 1.10   ANION GAP mmol/L 11   CALCIUM mg/dL 8.0*   ALBUMIN g/dL 3.5   TOTAL BILIRUBIN mg/dL 0.66   ALK PHOS U/L 99   ALT U/L 20   AST U/L 13   GLUCOSE RANDOM mg/dL 582*         Results from last 7 days   Lab Units 01/18/25  2233 01/18/25  2115 01/18/25  2015 01/18/25  1845 01/18/25  1739 01/18/25  1629 01/18/25  1420 01/18/25  0617 01/17/25  2249 01/17/25  2125 01/17/25  1946   POC GLUCOSE mg/dl 370* 348* 325* 333* 456* 509* 481* 370* 326* 450* 497*     Lab Results   Component Value Date    HGBA1C 7.0 (H) 06/13/2024    HGBA1C 7.2 (H) 03/14/2024    HGBA1C 8.8 (H) 11/11/2023     Results from last 7 days   Lab Units 01/17/25  2250 01/17/25 2022   LACTIC ACID mmol/L 2.7* 3.5*       Imaging Results Review: No pertinent imaging studies reviewed.  Other Study Results Review: EKG was personally reviewed and my interpretation is: NSR. HR 75..    Administrative Statements   ** Please Note: This note has been constructed using a voice recognition system. **

## 2025-01-19 NOTE — PLAN OF CARE
Problem: Potential for Falls  Goal: Patient will remain free of falls  Description: INTERVENTIONS:  - Educate patient/family on patient safety including physical limitations  - Instruct patient to call for assistance with activity   - Consult OT/PT to assist with strengthening/mobility   - Keep Call bell within reach  - Keep bed low and locked with side rails adjusted as appropriate  - Keep care items and personal belongings within reach  - Initiate and maintain comfort rounds  - Make Fall Risk Sign visible to staff  - Offer Toileting every  Hours, in advance of need  - Initiate/Maintain alarm  - Obtain necessary fall risk management equipment:   - Apply yellow socks and bracelet for high fall risk patients  - Consider moving patient to room near nurses station  Outcome: Progressing     Problem: Prexisting or High Potential for Compromised Skin Integrity  Goal: Skin integrity is maintained or improved  Description: INTERVENTIONS:  - Identify patients at risk for skin breakdown  - Assess and monitor skin integrity  - Assess and monitor nutrition and hydration status  - Monitor labs   - Assess for incontinence   - Turn and reposition patient  - Assist with mobility/ambulation  - Relieve pressure over bony prominences  - Avoid friction and shearing  - Provide appropriate hygiene as needed including keeping skin clean and dry  - Evaluate need for skin moisturizer/barrier cream  - Collaborate with interdisciplinary team   - Patient/family teaching  - Consider wound care consult   Outcome: Progressing     Problem: PAIN - ADULT  Goal: Verbalizes/displays adequate comfort level or baseline comfort level  Description: Interventions:  - Encourage patient to monitor pain and request assistance  - Assess pain using appropriate pain scale  - Administer analgesics based on type and severity of pain and evaluate response  - Implement non-pharmacological measures as appropriate and evaluate response  - Consider cultural and  social influences on pain and pain management  - Notify physician/advanced practitioner if interventions unsuccessful or patient reports new pain  Outcome: Progressing     Problem: INFECTION - ADULT  Goal: Absence or prevention of progression during hospitalization  Description: INTERVENTIONS:  - Assess and monitor for signs and symptoms of infection  - Monitor lab/diagnostic results  - Monitor all insertion sites, i.e. indwelling lines, tubes, and drains  - Monitor endotracheal if appropriate and nasal secretions for changes in amount and color  - [place] appropriate cooling/warming therapies per order  - Administer medications as ordered  - Instruct and encourage patient and family to use good hand hygiene technique  - Identify and instruct in appropriate isolation precautions for identified infection/condition  Outcome: Progressing     Problem: SAFETY ADULT  Goal: Patient will remain free of falls  Description: INTERVENTIONS:  - Educate patient/family on patient safety including physical limitations  - Instruct patient to call for assistance with activity   - Consult OT/PT to assist with strengthening/mobility   - Keep Call bell within reach  - Keep bed low and locked with side rails adjusted as appropriate  - Keep care items and personal belongings within reach  - Initiate and maintain comfort rounds  - Make Fall Risk Sign visible to staff  - Offer Toileting every  Hours, in advance of need  - Initiate/Maintain alarm  - Obtain necessary fall risk management equipment: Apply yellow socks and bracelet for high fall risk patients  - Consider moving patient to room near nurses station  Outcome: Progressing  Goal: Maintain or return to baseline ADL function  Description: INTERVENTIONS:  -  Assess patient's ability to carry out ADLs; assess patient's baseline for ADL function and identify physical deficits which impact ability to perform ADLs (bathing, care of mouth/teeth, toileting, grooming, dressing, etc.)  -  Assess/evaluate cause of self-care deficits   - Assess range of motion  - Assess patient's mobility; develop plan if impaired  - Assess patient's need for assistive devices and provide as appropriate  - Encourage maximum independence but intervene and supervise when necessary  - Involve family in performance of ADLs  - Assess for home care needs following discharge   - Consider OT consult to assist with ADL evaluation and planning for discharge  - Provide patient education as appropriate  Outcome: Progressing  Goal: Maintains/Returns to pre admission functional level  Description: INTERVENTIONS:  - Perform AM-PAC 6 Click Basic Mobility/ Daily Activity assessment daily.  - Set and communicate daily mobility goal to care team and patient/family/caregiver.   - Collaborate with rehabilitation services on mobility goals if consulted  - Perform Range of Motion  times a day.  - Reposition patient every  hours.  - Dangle patient  times a day  - Stand patient  times a day  - Ambulate patient  times a day  - Out of bed to chair  times a day   - Out of bed for meals  times a day  - Out of bed for toileting  - Record patient progress and toleration of activity level   Outcome: Progressing     Problem: DISCHARGE PLANNING  Goal: Discharge to home or other facility with appropriate resources  Description: INTERVENTIONS:  - Identify barriers to discharge w/patient and caregiver  - Arrange for needed discharge resources and transportation as appropriate  - Identify discharge learning needs (meds, wound care, etc.)  - Arrange for interpretive services to assist at discharge as needed  - Refer to Case Management Department for coordinating discharge planning if the patient needs post-hospital services based on physician/advanced practitioner order or complex needs related to functional status, cognitive ability, or social support system  Outcome: Progressing     Problem: Knowledge Deficit  Goal: Patient/family/caregiver demonstrates  understanding of disease process, treatment plan, medications, and discharge instructions  Description: Complete learning assessment and assess knowledge base.  Interventions:  - Provide teaching at level of understanding  - Provide teaching via preferred learning methods  Outcome: Progressing

## 2025-01-19 NOTE — DISCHARGE INSTRUCTIONS
Mr. You should be seen by his endocrinologist as soon as he can get an appointment to further evaluate and manage his diabetes in the setting of his need for chronic steroids.

## 2025-01-19 NOTE — PROGRESS NOTES
Progress Note - Hospitalist   Name: Karson You 69 y.o. male I MRN: [id number]  Unit/Bed#: E4 -01 I Date of Admission: 1/18/2025   Date of Service: 1/19/2025 I Hospital Day: 0    Assessment & Plan  Type 1 diabetes mellitus with hyperglycemia (HCC)  Lab Results   Component Value Date    HGBA1C 7.0 (H) 06/13/2024     Recent Labs     01/18/25 2015 01/18/25  2115 01/18/25  2233 01/19/25  0812   POCGLU 325* 348* 370* 90   Blood Sugar Average: Last 72 hrs:  (P) 364    Patient is a 69-year-old male with type 1 diabetes, who was sent to the ER for hyperglycemia from [facility]    Initial blood sugar in the ER was in the 500s  Per outpatient endocrinology notes, patient has a history of type 1 diabetes, extremely brittle   Apparently for be discontinued his sliding scale insulin due to hypoglycemia   Outpatient regimen: lantus 10u HS, humalog 10u 3 times daily AC & SSI   ED discussed with on-call endocrinology who recommended 22u Lantus HS and humalog 12u AC  Patient's hyperglycemia has improved  Discussed with patient daughter who notes he has very labile blood sugars with precipitants of hypoglycemic spells: Will recheck blood sugar now and monitor closely  Continue current regimen, Accu-Cheks, monitor and titrate as needed  Paroxysmal atrial fibrillation   Rate controlled, no PTA beta-blockers,   continue anticoagulation with Eliquis   Adrenal insufficiency (HCC)  Outpatient regimen: hydrocortisone 20mg AM/15mg PM, Florinef 0.2mg daily  Follows with  [facility] endocrinology  Orthostatic hypotension  Was admitted 1/7/2025 with a fall and positive orthostatic hypotension  His previous midodrine was restarted at 5 mg 3 times daily  Patient has a history of orthostatic hypotension and follows with [facility] nephrology:  Prior 6/24 office note: Suspected to be secondary to autonomic dysfunction in the setting of type 1 diabetes  Patient also has adrenal insufficiency  (In the past patient was also on salt tablets: Stopped  6/2024 by cardiology)  (Prior midodrine dose had been as high as 15 mg 3 times daily)  Patient is also on Florinef and hydrocortisone for adrenal insufficiency  Compression stockings, abdominal binders when ambulating   Fall precautions   At [facility] for rehab   Chronic diastolic heart failure (HCC)  Wt Readings from Last 3 Encounters:   01/19/25 87.1 kg (192 lb 0.3 oz)   01/17/25 89.5 kg (197 lb 5 oz)   09/05/24 77.8 kg (171 lb 9.6 oz)   Most recent 2D echocardiogram 12/23: Left ventricular ejection fraction 60%.  Normal diastolic function.  No significant valvular heart disease  Clinically euvolemic  Not on maintenance diuretics, was on Lasix 20 mg daily as needed  Hypokalemia  Patient with hypokalemia and hypomagnesemia: Repleted  Will recheck the level this afternoon    VTE Pharmacologic Prophylaxis: VTE Score: 3 Moderate Risk (Score 3-4) - Pharmacological DVT Prophylaxis Ordered: apixaban (Eliquis).    Mobility:   Basic Mobility Inpatient Raw Score: 22  -HLM Goal: 7: Walk 25 feet or more  JH-HLM Goal NOT achieved. Continue with multidisciplinary rounding and encourage appropriate mobility to improve upon JH-HLM goals.    Patient Centered Rounds: I performed bedside rounds with nursing staff today.   Discussions with Specialists or Other Care Team Provider: CM    Education and Discussions with Family / Patient: called daughter Ofelia Raymond and gave update    Current Length of Stay: 0 day(s)  Current Patient Status: inpatient  Certification Statement: The patient will continue to require additional inpatient hospital stay due to hyperglycemia  Discharge Plan: Anticipate discharge in 24-48 hrs to rehab facility.    Code Status: Level 1 - Full Code    Subjective   Patient denies any complaints.  Denies any pain anywhere.  Denies any chest pain, denies any difficulty breathing.  Denies any abdominal pain.  Denies any nausea, vomiting.  Is tolerating p.o.  Denies any dizziness or lightheadedness.  Notes he is  eating well.    Objective :  Temp:  [97.2 °F (36.2 °C)-98.4 °F (36.9 °C)] 97.7 °F (36.5 °C)  HR:  [74-86] 74  BP: (142-190)/(64-86) 155/76  Resp:  [16-20] 20  SpO2:  [98 %-100 %] 98 %  O2 Device: None (Room air)    Body mass index is 32.96 kg/m².     Input and Output Summary (last 24 hours):     Intake/Output Summary (Last 24 hours) at 1/19/2025 1002  Last data filed at 1/19/2025 0501  Gross per 24 hour   Intake 1240 ml   Output --   Net 1240 ml       Physical Exam  General: Very pleasant male.  No acute distress.  Nontachypneic and nondyspneic  Heart: Regular rate and rhythm.  S1-S2 present.  No murmur, rub, gallop  Lungs: Clear to auscultation bilaterally.  No wheezes, crackles, rhonchi.  No accessory muscle use or respiratory stress  Abdomen: Soft, nontender, nondistended, normoactive bowel sounds present.  No guarding or rebound.  No peritoneal signs or mass  Extremities: No clubbing, cyanosis, edema.  2+ pedal pulses bilaterally  Neurologic: Awake.  Alert.  Communicative.  Interactive.  No somnolence or lethargy    Lines/Drains:              Lab Results: I have reviewed the following results:   Results from last 7 days   Lab Units 01/19/25  0457 01/18/25  1507   WBC Thousand/uL 9.96 9.58   HEMOGLOBIN g/dL 10.1* 11.3*   HEMATOCRIT % 30.5* 34.3*   PLATELETS Thousands/uL 199 216   SEGS PCT %  --  84*   LYMPHO PCT %  --  9*   MONO PCT %  --  6   EOS PCT %  --  0     Results from last 7 days   Lab Units 01/19/25  0457 01/18/25  1507   SODIUM mmol/L 140 134*   POTASSIUM mmol/L 2.7* 3.6   CHLORIDE mmol/L 109* 100   CO2 mmol/L 26 23   BUN mg/dL 11 16   CREATININE mg/dL 0.91 1.10   ANION GAP mmol/L 5 11   CALCIUM mg/dL 7.6* 8.0*   ALBUMIN g/dL  --  3.5   TOTAL BILIRUBIN mg/dL  --  0.66   ALK PHOS U/L  --  99   ALT U/L  --  20   AST U/L  --  13   GLUCOSE RANDOM mg/dL 135 582*         Results from last 7 days   Lab Units 01/19/25  0812 01/18/25  2233 01/18/25  2115 01/18/25 2015 01/18/25  1845 01/18/25  1731  01/18/25  1629 01/18/25  1420 01/18/25  0617 01/17/25  2249 01/17/25  2125 01/17/25  1946   POC GLUCOSE mg/dl 90 370* 348* 325* 333* 456* 509* 481* 370* 326* 450* 497*         Results from last 7 days   Lab Units 01/17/25  2250 01/17/25 2022   LACTIC ACID mmol/L 2.7* 3.5*       Recent Cultures (last 7 days):             Last 24 Hours Medication List:     Current Facility-Administered Medications:     acetaminophen (TYLENOL) tablet 975 mg, Q8H PRN    aluminum-magnesium hydroxide-simethicone (MAALOX) oral suspension 30 mL, Q6H PRN    apixaban (ELIQUIS) tablet 5 mg, BID    aspirin chewable tablet 81 mg, Daily    atorvastatin (LIPITOR) tablet 10 mg, Daily With Dinner    fludrocortisone (FLORINEF) tablet 0.2 mg, Daily    hydrocortisone (CORTEF) tablet 15 mg, Daily    hydrocortisone (CORTEF) tablet 20 mg, QAM    insulin glargine (LANTUS) subcutaneous injection 22 Units 0.22 mL, HS    insulin lispro (HumALOG/ADMELOG) 100 units/mL subcutaneous injection 12 Units, TID With Meals    magnesium sulfate 2 g/50 mL IVPB (premix) 2 g, Once, Last Rate: 2 g (01/19/25 0856)    midodrine (PROAMATINE) tablet 5 mg, TID AC    ondansetron (ZOFRAN) injection 4 mg, Q6H PRN    polyethylene glycol (MIRALAX) packet 17 g, Daily PRN    Administrative Statements   Today, Patient Was Seen By: Heena Garcia MD      **Please Note: This note may have been constructed using a voice recognition system.**

## 2025-01-20 LAB
ANION GAP SERPL CALCULATED.3IONS-SCNC: 6 MMOL/L (ref 4–13)
BASOPHILS # BLD AUTO: 0.01 THOUSANDS/ΜL (ref 0–0.1)
BASOPHILS NFR BLD AUTO: 0 % (ref 0–1)
BUN SERPL-MCNC: 8 MG/DL (ref 5–25)
CALCIUM SERPL-MCNC: 7.3 MG/DL (ref 8.4–10.2)
CHLORIDE SERPL-SCNC: 108 MMOL/L (ref 96–108)
CO2 SERPL-SCNC: 26 MMOL/L (ref 21–32)
CREAT SERPL-MCNC: 0.87 MG/DL (ref 0.6–1.3)
EOSINOPHIL # BLD AUTO: 0.11 THOUSAND/ΜL (ref 0–0.61)
EOSINOPHIL NFR BLD AUTO: 1 % (ref 0–6)
ERYTHROCYTE [DISTWIDTH] IN BLOOD BY AUTOMATED COUNT: 14.6 % (ref 11.6–15.1)
GFR SERPL CREATININE-BSD FRML MDRD: 89 ML/MIN/1.73SQ M
GLUCOSE SERPL-MCNC: 136 MG/DL (ref 65–140)
GLUCOSE SERPL-MCNC: 150 MG/DL (ref 65–140)
GLUCOSE SERPL-MCNC: 174 MG/DL (ref 65–140)
GLUCOSE SERPL-MCNC: 182 MG/DL (ref 65–140)
GLUCOSE SERPL-MCNC: 220 MG/DL (ref 65–140)
HCT VFR BLD AUTO: 33 % (ref 36.5–49.3)
HGB BLD-MCNC: 11 G/DL (ref 12–17)
IMM GRANULOCYTES # BLD AUTO: 0.06 THOUSAND/UL (ref 0–0.2)
IMM GRANULOCYTES NFR BLD AUTO: 1 % (ref 0–2)
LYMPHOCYTES # BLD AUTO: 1.76 THOUSANDS/ΜL (ref 0.6–4.47)
LYMPHOCYTES NFR BLD AUTO: 18 % (ref 14–44)
MAGNESIUM SERPL-MCNC: 1.8 MG/DL (ref 1.9–2.7)
MCH RBC QN AUTO: 31.9 PG (ref 26.8–34.3)
MCHC RBC AUTO-ENTMCNC: 33.3 G/DL (ref 31.4–37.4)
MCV RBC AUTO: 96 FL (ref 82–98)
MONOCYTES # BLD AUTO: 0.98 THOUSAND/ΜL (ref 0.17–1.22)
MONOCYTES NFR BLD AUTO: 10 % (ref 4–12)
NEUTROPHILS # BLD AUTO: 6.73 THOUSANDS/ΜL (ref 1.85–7.62)
NEUTS SEG NFR BLD AUTO: 70 % (ref 43–75)
NRBC BLD AUTO-RTO: 0 /100 WBCS
PLATELET # BLD AUTO: 209 THOUSANDS/UL (ref 149–390)
PMV BLD AUTO: 9.8 FL (ref 8.9–12.7)
POTASSIUM SERPL-SCNC: 2.8 MMOL/L (ref 3.5–5.3)
RBC # BLD AUTO: 3.45 MILLION/UL (ref 3.88–5.62)
SODIUM SERPL-SCNC: 140 MMOL/L (ref 135–147)
WBC # BLD AUTO: 9.65 THOUSAND/UL (ref 4.31–10.16)

## 2025-01-20 PROCEDURE — G0426 INPT/ED TELECONSULT50: HCPCS | Performed by: INTERNAL MEDICINE

## 2025-01-20 PROCEDURE — 83735 ASSAY OF MAGNESIUM: CPT | Performed by: INTERNAL MEDICINE

## 2025-01-20 PROCEDURE — 99232 SBSQ HOSP IP/OBS MODERATE 35: CPT | Performed by: INTERNAL MEDICINE

## 2025-01-20 PROCEDURE — 80048 BASIC METABOLIC PNL TOTAL CA: CPT | Performed by: INTERNAL MEDICINE

## 2025-01-20 PROCEDURE — 97166 OT EVAL MOD COMPLEX 45 MIN: CPT

## 2025-01-20 PROCEDURE — 82948 REAGENT STRIP/BLOOD GLUCOSE: CPT

## 2025-01-20 PROCEDURE — 85025 COMPLETE CBC W/AUTO DIFF WBC: CPT | Performed by: INTERNAL MEDICINE

## 2025-01-20 RX ORDER — MAGNESIUM SULFATE HEPTAHYDRATE 40 MG/ML
2 INJECTION, SOLUTION INTRAVENOUS ONCE
Status: COMPLETED | OUTPATIENT
Start: 2025-01-20 | End: 2025-01-20

## 2025-01-20 RX ORDER — HYDRALAZINE HYDROCHLORIDE 20 MG/ML
5 INJECTION INTRAMUSCULAR; INTRAVENOUS EVERY 6 HOURS PRN
Status: DISCONTINUED | OUTPATIENT
Start: 2025-01-20 | End: 2025-01-21 | Stop reason: HOSPADM

## 2025-01-20 RX ORDER — LANOLIN ALCOHOL/MO/W.PET/CERES
400 CREAM (GRAM) TOPICAL 2 TIMES DAILY
Status: DISCONTINUED | OUTPATIENT
Start: 2025-01-20 | End: 2025-01-21 | Stop reason: HOSPADM

## 2025-01-20 RX ORDER — POTASSIUM CHLORIDE 1500 MG/1
40 TABLET, EXTENDED RELEASE ORAL EVERY 4 HOURS
Status: COMPLETED | OUTPATIENT
Start: 2025-01-20 | End: 2025-01-20

## 2025-01-20 RX ADMIN — Medication 400 MG: at 17:18

## 2025-01-20 RX ADMIN — MAGNESIUM SULFATE HEPTAHYDRATE 2 G: 40 INJECTION, SOLUTION INTRAVENOUS at 13:50

## 2025-01-20 RX ADMIN — ASPIRIN 81 MG CHEWABLE TABLET 81 MG: 81 TABLET CHEWABLE at 08:15

## 2025-01-20 RX ADMIN — POTASSIUM CHLORIDE 40 MEQ: 1500 TABLET, EXTENDED RELEASE ORAL at 17:19

## 2025-01-20 RX ADMIN — FLUDROCORTISONE ACETATE 0.2 MG: 0.1 TABLET ORAL at 08:15

## 2025-01-20 RX ADMIN — APIXABAN 5 MG: 5 TABLET, FILM COATED ORAL at 21:43

## 2025-01-20 RX ADMIN — HYDRALAZINE HYDROCHLORIDE 5 MG: 20 INJECTION, SOLUTION INTRAMUSCULAR; INTRAVENOUS at 01:09

## 2025-01-20 RX ADMIN — INSULIN GLARGINE 18 UNITS: 100 INJECTION, SOLUTION SUBCUTANEOUS at 21:43

## 2025-01-20 RX ADMIN — HYDROCORTISONE 15 MG: 10 TABLET ORAL at 17:19

## 2025-01-20 RX ADMIN — INSULIN LISPRO 6 UNITS: 100 INJECTION, SOLUTION INTRAVENOUS; SUBCUTANEOUS at 17:19

## 2025-01-20 RX ADMIN — INSULIN LISPRO 6 UNITS: 100 INJECTION, SOLUTION INTRAVENOUS; SUBCUTANEOUS at 12:38

## 2025-01-20 RX ADMIN — APIXABAN 5 MG: 5 TABLET, FILM COATED ORAL at 08:15

## 2025-01-20 RX ADMIN — INSULIN LISPRO 6 UNITS: 100 INJECTION, SOLUTION INTRAVENOUS; SUBCUTANEOUS at 08:17

## 2025-01-20 RX ADMIN — ATORVASTATIN CALCIUM 10 MG: 10 TABLET, FILM COATED ORAL at 17:19

## 2025-01-20 RX ADMIN — HYDROCORTISONE 20 MG: 10 TABLET ORAL at 08:15

## 2025-01-20 RX ADMIN — POTASSIUM CHLORIDE 40 MEQ: 1500 TABLET, EXTENDED RELEASE ORAL at 13:49

## 2025-01-20 NOTE — CASE MANAGEMENT
Case Management Assessment & Discharge Planning Note    Patient name Karson You  Location [facility] /E4 -* MRN [id number]  : 1955 Date 2025       Current Admission Date: 2025  Current Admission Diagnosis:Type 1 diabetes mellitus with hyperglycemia (HCC)   Patient Active Problem List    Diagnosis Date Noted Date Diagnosed    Leukocytosis 2025     Type 1 diabetes mellitus with hyperglycemia (HCC) 2024     PAD (peripheral artery disease) (HCC) 2024     Stage 2 chronic kidney disease 2024     Mixed hyperlipidemia 2024     Morbid (severe) obesity due to excess calories ([facility]) 2024     Vitamin D deficiency 2024     Chronic diastolic heart failure (HCC) 2023     Prolonged Q-T interval on ECG 2023     Hypokalemia 2023     Hypomagnesemia 2023     Fall 11/10/2023     Adrenal insufficiency ([facility]) 11/10/2023     Orthostatic hypotension 11/10/2023     History of CVA (cerebrovascular accident) 11/10/2023     Spells of decreased attentiveness 2023     Iron deficiency anemia, unspecified 2023     Syncope 2023     Unintentional weight loss 2022     Paroxysmal atrial fibrillation  10/19/2022     Anemia of chronic disease 10/19/2022     Psychiatric disorder 10/19/2022       LOS (days): 1  Geometric Mean LOS (GMLOS) (days): 3  Days to GMLOS:1.9     OBJECTIVE:  PATIENT READMITTED TO HOSPITAL  Risk of Unplanned Readmission Score: 26.32         Current admission status: Inpatient       Preferred Pharmacy:   [facility] - [place] - [address]  [address]  [place] [address]  Phone: [phone] Fax: [phone]    [id number] [place] - [facility]  [place] [address]  Phone: [phone] Fax: [phone]    [facility] - [place] - [address]  [address] [facility] [place] [address]  Phone: [phone] Fax:  [phone]    [id number] - [place] - [address]  [address]  [place] [address]  Phone: [phone] Fax: [phone]    Primary Care Provider: NACHO Kenney    Primary Insurance: [employer] [facility]  Secondary Insurance:     ASSESSMENT:  Active Health Care Proxies       Ofelia Raymond Health Care Representative - Daughter   Primary Phone: [phone] (Mobile)  Home Phone: [phone]                 Advance Directives  Does patient have a Health Care POA?: Yes  Does patient have Advance Directives?: Yes  Advance Directives: Living will, Power of  for health care (Family working on updating POA forms as initial POA now has dementia)  Primary Contact: Ofelia Raymond (Daughter)   [phone]         Readmission Root Cause  30 Day Readmission: Yes  During your hospital stay, did someone (provider, nurse, ) explain your care to you in a way you could understand?: Yes  Did you feel medically stable to leave the hospital?: Yes  Were you able to pay for your medication at the pharmacy?: Yes  Did you have reliable transportation to take you to your appointments?: Yes  During previous admission, was a post-acute recommendation made?: Yes  What post-acute resources were offered?: STR  Patient was readmitted due to: Diabetes/ Blood sugars  Action Plan: Endocrine consult    Patient Information  Admitted from:: Facility  Mental Status: Alert  During Assessment patient was accompanied by: Not accompanied during assessment  Assessment information provided by:: Patient, Daughter  Primary Caregiver: Private caregiver  Caregiver's Name:: Paid private caregiver- daughter is contact  Caregiver's Relationship to Patient:: Family Member  Caregiver's Telephone Number:: Ofelia Raymond (Daughter)   [phone]  Support Systems: Self, Children, Private Caregivers  County of Residence: [place]  What city do you live in?: [place]  Home entry access  options. Select all that apply.: Stairs  Number of steps to enter home.: 6  Do the steps have railings?: Yes  Type of Current Residence: 2 story home  Upon entering residence, is there a bedroom on the main floor (no further steps)?: Yes  Upon entering residence, is there a bathroom on the main floor (no further steps)?: Yes  Living Arrangements: Lives w/ Friend  Is patient a ?: No    Activities of Daily Living Prior to Admission  Functional Status: Assistance  Completes ADLs independently?: No  Level of ADL dependence: Assistance  Ambulates independently?: No  Level of ambulatory dependence: Assistance  Does patient use assisted devices?: Yes  Assisted Devices (DME) used: Walker  Does patient currently own DME?: Yes  What DME does the patient currently own?: Walker  Does patient have a history of Outpatient Therapy (PT/OT)?: Yes  Does the patient have a history of Short-Term Rehab?: Yes ([facility]/ [facility])  Does patient have a history of HHC?: Yes ([facility])  Does patient currently have HHC?: No         Patient Information Continued  Income Source: Pension/custodial  Does patient have prescription coverage?: Yes  Does patient receive dialysis treatments?: No  Does patient have a history of substance abuse?: No  Does patient have a history of Mental Health Diagnosis?: No         Means of Transportation  Means of Transport to Appts:: Family transport          DISCHARGE DETAILS:    Discharge planning discussed with:: Patient and daughter  Freedom of Choice: Yes  Comments - Freedom of Choice: Return to [facility]- preference would be [facility] or [facility]- referrals sent  CM contacted family/caregiver?: Yes  Were Treatment Team discharge recommendations reviewed with patient/caregiver?: Yes  Did patient/caregiver verbalize understanding of patient care needs?: N/A- going to facility  Were patient/caregiver advised of the risks associated with not following Treatment Team discharge recommendations?:  Yes    Contacts  Patient Contacts: Ofelia Raymond  Relationship to Patient:: Family  Contact Method: Phone  Phone Number: Ofelia Raymond (Daughter)   [phone]  Reason/Outcome: Continuity of Care, Emergency Contact, Referral, Discharge Planning    Requested Home Health Care         Is the patient interested in HHC at discharge?: No    DME Referral Provided  Referral made for DME?: No    Other Referral/Resources/Interventions Provided:  Interventions: Short Term Rehab  Referral Comments: [place] referral sent to [facility]    Would you like to participate in our [facility] service program?  : No - Declined    Treatment Team Recommendation: Short Term Rehab  Discharge Destination Plan:: Short Term Rehab                    CM met with patient at bedside to introduce self and role with DC planning.  Patient was admitted from [facility] at [facility], he would like to return there once stable.  Prior to [facility], patient was residing at home with private caregivers and family assistance.      CM spoke with patient's daughter via PC, family would like patient to return to [facility] at [facility] or [facility].  Family agreeable to referrals to other facilities as well but would like [facility] and [facility] kept off of the referral.  Family understands facility will depend on bed availability and obtaining updated auth.      CM sent blanket referral to [facility].  CM messaged [facility] and [facility] as they are families preference.  CM will continue to follow.

## 2025-01-20 NOTE — PHYSICAL THERAPY NOTE
Karson You is a 69 y.o. male with a PMH of CVA, PAF, HFpEF, adrenal insufficiency, T1DM who presents with hyperglycemia. Presents from [facility] in [place] following hospitalization at [facility] on 1/11.    Son, 2 story home, 1st floor set up  ADL assist, amb with RW      PT, act as tolerated

## 2025-01-20 NOTE — PLAN OF CARE
Problem: Potential for Falls  Goal: Patient will remain free of falls  Description: INTERVENTIONS:  - Educate patient/family on patient safety including physical limitations  - Instruct patient to call for assistance with activity   - Consult OT/PT to assist with strengthening/mobility   - Keep Call bell within reach  - Keep bed low and locked with side rails adjusted as appropriate  - Keep care items and personal belongings within reach  - Initiate and maintain comfort rounds  - Make Fall Risk Sign visible to staff  - Offer Toileting every  Hours, in advance of need  - Initiate/Maintain alarm  - Obtain necessary fall risk management equipment:   - Apply yellow socks and bracelet for high fall risk patients  - Consider moving patient to room near nurses station  Outcome: Progressing     Problem: Prexisting or High Potential for Compromised Skin Integrity  Goal: Skin integrity is maintained or improved  Description: INTERVENTIONS:  - Identify patients at risk for skin breakdown  - Assess and monitor skin integrity  - Assess and monitor nutrition and hydration status  - Monitor labs   - Assess for incontinence   - Turn and reposition patient  - Assist with mobility/ambulation  - Relieve pressure over bony prominences  - Avoid friction and shearing  - Provide appropriate hygiene as needed including keeping skin clean and dry  - Evaluate need for skin moisturizer/barrier cream  - Collaborate with interdisciplinary team   - Patient/family teaching  - Consider wound care consult   Outcome: Progressing     Problem: PAIN - ADULT  Goal: Verbalizes/displays adequate comfort level or baseline comfort level  Description: Interventions:  - Encourage patient to monitor pain and request assistance  - Assess pain using appropriate pain scale  - Administer analgesics based on type and severity of pain and evaluate response  - Implement non-pharmacological measures as appropriate and evaluate response  - Consider cultural and  social influences on pain and pain management  - Notify physician/advanced practitioner if interventions unsuccessful or patient reports new pain  Outcome: Progressing     Problem: INFECTION - ADULT  Goal: Absence or prevention of progression during hospitalization  Description: INTERVENTIONS:  - Assess and monitor for signs and symptoms of infection  - Monitor lab/diagnostic results  - Monitor all insertion sites, i.e. indwelling lines, tubes, and drains  - Monitor endotracheal if appropriate and nasal secretions for changes in amount and color  - Wallace appropriate cooling/warming therapies per order  - Administer medications as ordered  - Instruct and encourage patient and family to use good hand hygiene technique  - Identify and instruct in appropriate isolation precautions for identified infection/condition  Outcome: Progressing     Problem: SAFETY ADULT  Goal: Patient will remain free of falls  Description: INTERVENTIONS:  - Educate patient/family on patient safety including physical limitations  - Instruct patient to call for assistance with activity   - Consult OT/PT to assist with strengthening/mobility   - Keep Call bell within reach  - Keep bed low and locked with side rails adjusted as appropriate  - Keep care items and personal belongings within reach  - Initiate and maintain comfort rounds  - Make Fall Risk Sign visible to staff  - Offer Toileting every  Hours, in advance of need  - Initiate/Maintain alarm  - Obtain necessary fall risk management equipment:   - Apply yellow socks and bracelet for high fall risk patients  - Consider moving patient to room near nurses station  Outcome: Progressing  Goal: Maintain or return to baseline ADL function  Description: INTERVENTIONS:  -  Assess patient's ability to carry out ADLs; assess patient's baseline for ADL function and identify physical deficits which impact ability to perform ADLs (bathing, care of mouth/teeth, toileting, grooming, dressing, etc.)  -  Assess/evaluate cause of self-care deficits   - Assess range of motion  - Assess patient's mobility; develop plan if impaired  - Assess patient's need for assistive devices and provide as appropriate  - Encourage maximum independence but intervene and supervise when necessary  - Involve family in performance of ADLs  - Assess for home care needs following discharge   - Consider OT consult to assist with ADL evaluation and planning for discharge  - Provide patient education as appropriate  Outcome: Progressing  Goal: Maintains/Returns to pre admission functional level  Description: INTERVENTIONS:  - Perform AM-PAC 6 Click Basic Mobility/ Daily Activity assessment daily.  - Set and communicate daily mobility goal to care team and patient/family/caregiver.   - Collaborate with rehabilitation services on mobility goals if consulted  - Perform Range of Motion  times a day.  - Reposition patient every  hours.  - Dangle patient  times a day  - Stand patient  times a day  - Ambulate patient  times a day  - Out of bed to chair times a day   - Out of bed for meals imes a day  - Out of bed for toileting  - Record patient progress and toleration of activity level   Outcome: Progressing     Problem: DISCHARGE PLANNING  Goal: Discharge to home or other facility with appropriate resources  Description: INTERVENTIONS:  - Identify barriers to discharge w/patient and caregiver  - Arrange for needed discharge resources and transportation as appropriate  - Identify discharge learning needs (meds, wound care, etc.)  - Arrange for interpretive services to assist at discharge as needed  - Refer to Case Management Department for coordinating discharge planning if the patient needs post-hospital services based on physician/advanced practitioner order or complex needs related to functional status, cognitive ability, or social support system  Outcome: Progressing     Problem: Knowledge Deficit  Goal: Patient/family/caregiver demonstrates  understanding of disease process, treatment plan, medications, and discharge instructions  Description: Complete learning assessment and assess knowledge base.  Interventions:  - Provide teaching at level of understanding  - Provide teaching via preferred learning methods  Outcome: Progressing

## 2025-01-20 NOTE — UTILIZATION REVIEW
"Initial Clinical Review    Admission: Date/Time/Statement:   Admission Orders (From admission, onward)       Ordered        01/19/25 1139  INPATIENT ADMISSION  Once            01/18/25 2318  Place in Observation  Once                          Orders Placed This Encounter   Procedures    Place in Observation     Standing Status:   Standing     Number of Occurrences:   1     Level of Care:   Med Surg [16]    INPATIENT ADMISSION     Standing Status:   Standing     Number of Occurrences:   1     Level of Care:   Med Surg [16]     Estimated length of stay:   More than 2 Midnights     Certification:   I certify that inpatient services are medically necessary for this patient for a duration of greater than two midnights. See H&P and MD Progress Notes for additional information about the patient's course of treatment.     ED Arrival Information       Expected   -    Arrival   1/18/2025 14:10    Acuity   Emergent              Means of arrival   Ambulance    Escorted by   [facility] ([facility])    Service   Hospitalist    Admission type   Emergency              Arrival complaint   High Blood Sugar             Chief Complaint   Patient presents with    Hyperglycemia - no symptoms     EMS from [facility] reading \"high\". Was here overnight for same. Denies symptoms.      Initial Presentation: 69 y.o. male presents to the ED via EMS from nursing facility with c/o hyperglycemia - 576.  Recent episode of hypoglycemia with adjustment of insulin regimen.  PMH: CVA, PAF, HFpEF, adrenal insufficiency, T1DM, orthostatic hypotension.  In the ED VS stable, no c/o pain.  Treated with IV fluids, Humulin insulin x 4, PO Eliquis. Labs - low NA, Mag, elevated alk phos.  On exam no deficits.  Admitted to Observation Status with Type 1 DM w/ hyperglycemia - PLAN: Endocrinology consult, 22u Lantus HS and humalog 12u AC , hold oral antidiabetic meds, freq glucose testing, Eliquis, continue Midodrine with hold parameters, compression " stocking, abd binder, danyelle prec.      Anticipated Length of Stay/Certification Statement: Patient will be admitted on an observation basis with an anticipated length of stay of less than 2 midnights secondary to hyperglycemia.     Date: 1/19 CHANGED TO INPATIENT STATUS:   Type 1 DM w/ hyperglycemia - Family notes very labile blood sugars w/ hypoglycemic spells. Continues with insulin regimen, Accuchecks, continue Midodrine, hold prelunch Humalog and decrease dinner dose, decrease Lantus.  + orthostatics.  K down to 2.7 then 3.4.      Date: 1/20  Day 3: Has surpassed a 2nd midnight with active treatments and services.  Type 1 DM w/ hyperglycemia - pt is HTN today. K 2.8.  HR controlled.  Seen by Endocrinology. On exam has increased pedal edema and decreased breath sounds.  Pt has no additional complaints.  Once K stable will restart Lasix.  Continue K repletion and trending.      1/20 Endocrinology Econsult - longstanding DM type 1 with both hyper and hypoglycemia - use Lantus 18 u HS, Humalog 8 units with meals, start low-dose correctional scale before meals and bedtime. Restart personal CGM as OP, continue hydrocortisone and Florinef for adrenal insufficiency.  Replete K and trend.      ED Treatment-Medication Administration from 01/18/2025 1410 to 01/19/2025 0000         Date/Time Order Dose Route Action     01/18/2025 1507 sodium chloride 0.9 % bolus 1,000 mL 1,000 mL Intravenous New Bag     01/18/2025 1610 insulin regular (HumuLIN R,NovoLIN R) injection 9 Units 9 Units Intravenous Given     01/18/2025 1716 insulin regular (HumuLIN R,NovoLIN R) injection 6 Units 6 Units Intravenous Given     01/18/2025 1809 insulin regular (HumuLIN R,NovoLIN R) injection 6 Units 6 Units Intravenous Given     01/18/2025 2016 insulin glargine (LANTUS) subcutaneous injection 22 Units 0.22 mL 22 Units Subcutaneous Given     01/18/2025 2133 apixaban (ELIQUIS) tablet 5 mg 5 mg Oral Given     01/18/2025 2133 insulin lispro  (HumALOG/ADMELOG) 100 units/mL subcutaneous injection 8 Units 8 Units Subcutaneous Given            Scheduled Medications:  apixaban, 5 mg, Oral, BID  aspirin, 81 mg, Oral, Daily  atorvastatin, 10 mg, Oral, Daily With Dinner  fludrocortisone, 0.2 mg, Oral, Daily  furosemide, 20 mg, Oral, Daily  hydrocortisone, 15 mg, Oral, Daily  hydrocortisone, 20 mg, Oral, QAM  insulin glargine, 18 Units, Subcutaneous, HS  insulin lispro, 6 Units, Subcutaneous, TID With Meals  magnesium Oxide, 400 mg, Oral, BID  midodrine, 5 mg, Oral, TID AC      Continuous IV Infusions:     PRN Meds:  acetaminophen, 975 mg, Oral, Q8H PRN  aluminum-magnesium hydroxide-simethicone, 30 mL, Oral, Q6H PRN  hydrALAZINE, 5 mg, Intravenous, Q6H PRN - x 1 1/20  ondansetron, 4 mg, Intravenous, Q6H PRN  polyethylene glycol, 17 g, Oral, Daily PRN      ED Triage Vitals   Temperature Pulse Respirations Blood Pressure SpO2 Pain Score   01/18/25 1419 01/18/25 1419 01/18/25 1419 01/18/25 1419 01/18/25 1419 01/19/25 0017   98.4 °F (36.9 °C) 82 16 159/73 100 % No Pain     Weight (last 2 days)       Date/Time Weight    01/19/25 0032 87.1 (192.02)            Vital Signs (last 3 days)       Date/Time Temp Pulse Resp BP MAP (mmHg) SpO2 O2 Device Patient Position - Orthostatic VS Tiny Coma Scale Score Pain    01/21/25 1043 -- -- -- -- -- -- -- -- -- No Pain    01/21/25 0743 97.4 °F (36.3 °C) 73 20 181/88 -- 98 % None (Room air) Lying -- --    01/20/25 2343 97 °F (36.1 °C) 70 20 174/85 -- 97 % None (Room air) Lying -- --    01/20/25 2150 -- -- -- -- -- -- -- -- 15 No Pain    01/20/25 1956 97.3 °F (36.3 °C) 72 20 137/73 -- 98 % None (Room air) Lying -- --    01/20/25 1500 98.3 °F (36.8 °C) 74 20 142/74 87 98 % None (Room air) Lying -- --    01/20/25 1356 -- -- -- -- -- -- -- -- -- No Pain    01/20/25 0730 97.8 °F (36.6 °C) 74 20 177/79 -- 96 % None (Room air) Lying 15 No Pain    01/20/25 0153 -- -- -- 183/90 -- -- -- Lying -- --    01/19/25 2300 -- -- -- 198/90 -- --  -- Lying -- --    01/19/25 2256 -- -- -- 199/99 -- -- -- -- -- --    01/19/25 2215 98 °F (36.7 °C) 82 20 196/91 -- 96 % None (Room air) Lying -- --    01/19/25 2010 -- -- -- -- -- -- -- -- 15 No Pain    01/19/25 1548 -- 82 18 133/79 96 -- -- Standing - Orthostatic VS -- --    01/19/25 1545 97 °F (36.1 °C) 80 18 122/68 82 96 % -- Sitting - Orthostatic VS -- --    01/19/25 1542 98.5 °F (36.9 °C) 75 18 144/74 180 97 % None (Room air) Lying - Orthostatic VS -- --    01/19/25 1417 -- 74 -- 128/68 85 -- -- Lying -- --    01/19/25 0803 97.7 °F (36.5 °C) 74 20 155/76 92 98 % None (Room air) Lying -- --    01/19/25 0800 -- -- -- -- -- -- -- -- 15 No Pain    01/19/25 0508 -- -- -- 142/72 -- -- -- Lying -- --    01/19/25 0035 -- -- -- -- -- -- -- -- 15 No Pain    01/19/25 0032 97.2 °F (36.2 °C) 81 20 155/73 -- 100 % None (Room air) Lying -- --    01/19/25 0017 -- -- -- -- -- -- -- -- -- No Pain    01/18/25 2300 -- 75 18 154/73 -- 100 % None (Room air) Lying -- --    01/18/25 2028 -- 82 18 169/79 -- 100 % None (Room air) Lying -- --    01/18/25 1822 -- 78 16 146/64 92 99 % None (Room air) Sitting -- --    01/18/25 1715 -- 85 16 154/73 105 100 % None (Room air) Sitting -- --    01/18/25 1558 -- 86 17 190/86 123 99 % None (Room air) Lying -- --    01/18/25 1509 -- 82 16 188/81 -- 100 % None (Room air) Sitting -- --    01/18/25 1430 -- -- -- -- -- -- -- -- 14 --    01/18/25 1419 98.4 °F (36.9 °C) 82 16 159/73 -- 100 % None (Room air) Sitting -- --              Pertinent Labs/Diagnostic Test Results:   Radiology:  No orders to display     Cardiology:  No orders to display     GI:  No orders to display           Results from last 7 days   Lab Units 01/21/25  0448 01/20/25  0513 01/19/25  0457 01/18/25  1507 01/17/25 2022   WBC Thousand/uL 10.90* 9.65 9.96 9.58 9.23   HEMOGLOBIN g/dL 10.8* 11.0* 10.1* 11.3* 11.0*   HEMATOCRIT % 33.0* 33.0* 30.5* 34.3* 34.5*   PLATELETS Thousands/uL 209 209 199 216 242   TOTAL NEUT ABS Thousands/µL   --  6.73  --  8.07*  --          Results from last 7 days   Lab Units 01/21/25  0448 01/20/25  0513 01/19/25  1704 01/19/25  0457 01/18/25  1507 01/17/25 2022   SODIUM mmol/L 140 140  --  140 134* 134*   POTASSIUM mmol/L 3.5 2.8* 3.4* 2.7* 3.6 3.6   CHLORIDE mmol/L 108 108  --  109* 100 99   CO2 mmol/L 28 26  --  26 23 26   ANION GAP mmol/L 4 6  --  5 11 9   BUN mg/dL 8 8  --  11 16 19   CREATININE mg/dL 0.90 0.87  --  0.91 1.10 1.23   EGFR ml/min/1.73sq m 88 89  --  86 69 60   CALCIUM mg/dL 7.5* 7.3*  --  7.6* 8.0* 7.8*   MAGNESIUM mg/dL 2.0 1.8*  --   --  1.8* 1.8*   PHOSPHORUS mg/dL 2.9  --   --   --   --  2.6     Results from last 7 days   Lab Units 01/21/25 0448 01/18/25  1507 01/17/25 2022   AST U/L 19 13 29   ALT U/L 19 20 26   ALK PHOS U/L 67 99 110*   TOTAL PROTEIN g/dL 5.3* 6.3* 6.1*   ALBUMIN g/dL 2.9* 3.5 3.3*   TOTAL BILIRUBIN mg/dL 0.51 0.66 0.53   BILIRUBIN DIRECT mg/dL  --  0.18  --      Results from last 7 days   Lab Units 01/21/25  1139 01/21/25  0747 01/20/25  2125 01/20/25  1604 01/20/25  1158 01/20/25  0755 01/19/25  2054 01/19/25  1529 01/19/25  1240 01/19/25  1214 01/19/25  1150 01/19/25  0812   POC GLUCOSE mg/dl 79 99 174* 136 150* 182* 207* 170* 87 60* 59* 90     Results from last 7 days   Lab Units 01/21/25  0448 01/20/25  0513 01/19/25  0457 01/18/25  1507 01/17/25 2022   GLUCOSE RANDOM mg/dL 139 220* 135 582* 576*         Results from last 7 days   Lab Units 01/19/25 0457   HEMOGLOBIN A1C % 8.3*   EAG mg/dl 192     Beta- Hydroxybutyrate   Date Value Ref Range Status   01/17/2025 0.23 0.02 - 0.27 mmol/L Final   08/27/2024 0.07 0.02 - 0.27 mmol/L Final          Results from last 7 days   Lab Units 01/18/25  1507 01/17/25 2022   PH SILVIO  7.384 7.347   PCO2 SILVIO mm Hg 38.1* 46.6   PO2 SILVIO mm Hg 23.5* 29.4*   HCO3 SILVIO mmol/L 22.2* 25.0   BASE EXC SILVIO mmol/L -2.5 -0.9   O2 CONTENT SILVIO ml/dL 6.6 8.7   O2 HGB, VENOUS % 42.8* 53.8*       Results from last 7 days   Lab Units 01/18/25  1507    TSH 3RD GENERATON uIU/mL 0.828         Results from last 7 days   Lab Units 01/17/25  2250 01/17/25 2022   LACTIC ACID mmol/L 2.7* 3.5*     Results from last 7 days   Lab Units 01/18/25  1811   CLARITY UA  Clear   COLOR UA  Yellow   SPEC GRAV UA  1.015   PH UA  6.0   GLUCOSE UA mg/dl 500 (1/2%)*   KETONES UA mg/dl 15 (1+)*   BLOOD UA  Negative   PROTEIN UA mg/dl Negative   NITRITE UA  Negative   BILIRUBIN UA  Negative   UROBILINOGEN UA E.U./dl 0.2   LEUKOCYTES UA  Negative     Past Medical History:   Diagnosis Date    A-fib (HCC)     Adrenal insufficiency (HCC)     Atrial fibrillation (HCC)     Diabetes mellitus (HCC)     History of stroke 10/19/2022    Obesity, morbid (HCC) 11/14/2022    Shock (HCC) 12/14/2023    Stroke (HCC)     Type 2 MI (myocardial infarction) ([facility]) 11/03/2022     Present on Admission:   Type 1 diabetes mellitus with hyperglycemia (HCC)   Orthostatic hypotension   Paroxysmal atrial fibrillation    Adrenal insufficiency (HCC)   Chronic diastolic heart failure (HCC)   Hypokalemia      Admitting Diagnosis: Chronic diastolic heart failure (HCC) [I50.32]  High blood sugar [R73.9]  Hyperglycemia [R73.9]  Type 1 diabetes mellitus with hyperglycemia (HCC) [E10.65]  Age/Sex: 69 y.o. male    Network Utilization Review Department  ATTENTION: Please call with any questions or concerns to [phone] and carefully listen to the prompts so that you are directed to the right person. All voicemails are confidential.   For Discharge needs, contact Care Management DC Support Team at [phone] opt. 2  Send all requests for admission clinical reviews, approved or denied determinations and any other requests to dedicated fax number below belonging to the campus where the patient is receiving treatment. List of dedicated fax numbers for the Facilities:  FACILITY NAME UR FAX NUMBER   ADMISSION DENIALS (Administrative/Medical Necessity) [phone]   DISCHARGE SUPPORT TEAM (NETWORK) [phone]   PARENT CHILD  [facility] (Maternity/NICU/Pediatrics) [phone]   [facility] [phone]   [facility] [phone]   [facility] [phone]   [facility] [phone]   [facility] [phone]   [facility] [phone]   General acute hospital [phone]   [facility] [phone]   [facility] [phone]   [facility] [phone]   [facility] [phone]   [facility] [phone]

## 2025-01-20 NOTE — OCCUPATIONAL THERAPY NOTE
"    Occupational Therapy Evaluation     Patient Name: Karson You  Today's Date: 1/20/2025  Problem List  Principal Problem:    Type 1 diabetes mellitus with hyperglycemia (HCC)  Active Problems:    Paroxysmal atrial fibrillation     Adrenal insufficiency (HCC)    Orthostatic hypotension    Hypokalemia    Chronic diastolic heart failure (HCC)    Past Medical History  Past Medical History:   Diagnosis Date    A-fib (HCC)     Adrenal insufficiency (HCC)     Atrial fibrillation (HCC)     Diabetes mellitus (HCC)     History of stroke 10/19/2022    Obesity, morbid (HCC) 11/14/2022    Shock (HCC) 12/14/2023    Stroke (HCC)     Type 2 MI (myocardial infarction) (HCC) 11/03/2022     Past Surgical History  History reviewed. No pertinent surgical history.        01/20/25 1356   OT Last Visit   OT Visit Date 01/20/25   Note Type   Note type Evaluation   Pain Assessment   Pain Assessment Tool 0-10   Pain Score No Pain   Restrictions/Precautions   Weight Bearing Precautions Per Order No   Other Precautions Cognitive;Bed Alarm;Fall Risk   Home Living   Type of Home House   Home Layout Two level;1/2 bath on main level;Bed/bath upstairs;Stairs to enter with rails  (5 RYAN w HR)   Bathroom Shower/Tub Walk-in shower   Bathroom Toilet Raised   Bathroom Equipment Grab bars in shower;Shower chair;Grab bars around toilet   Home Equipment Walker   Additional Comments RW use at baseline. Reports he is never home alone.   Prior Function   Level of [place] Needs assistance with ADLs;Needs assistance with IADLS   Lives With Other (Comment)  (\"Friend\")   Receives Help From Friend(s)  (Daily HHA 2-3 hrs/day)   IADLs Family/Friend/Other provides transportation;Family/Friend/Other provides medication management;Family/Friend/Other provides meals   Falls in the last 6 months 1 to 4   Lifestyle   Autonomy PTA, required (A) with ADLs and required (A) with IADLs. Patient lives in a 2 SH w/ 1/2 bath on main, full bed/bath on 2nd floor w walk in " "shower - GB and SC. High rise toilet w/ GB. Has a HHA 7 days/week 2-3 hrs daily. (-) . (+) falls.   Reciprocal Relationships \"Friend\", HHA   General   Additional Pertinent History Comorbidities affecting pt’s functional performance include a significant PMH of: a-fib, orthostatic hypotension, CHF, psychiatric disorder, fall, h/o CVA, obesity, PAD.  Patient with active OT orders and activity orders for Activity as tolerated, OOB to chair.   Family/Caregiver Present No   Subjective   Subjective Pt agreeable to OT evaluation.   ADL   Where Assessed Edge of bed   Eating Assistance 6  Modified independent   Grooming Assistance 5  Supervision/Setup   UB Bathing Assistance 5  Supervision/Setup   LB Bathing Assistance 5  Supervision/Setup   UB Dressing Assistance 5  Supervision/Setup   LB Dressing Assistance 4  Minimal Assistance   Toileting Assistance  5  Supervision/Setup   Bed Mobility   Supine to Sit 5  Supervision   Additional items HOB elevated;Bedrails;Increased time required;Verbal cues   Sit to Supine 5  Supervision   Additional items Increased time required;Verbal cues   Transfers   Sit to Stand 5  Supervision   Additional items Increased time required;Verbal cues;Other  (RW)   Stand to Sit 5  Supervision   Additional items Increased time required;Verbal cues;Other  (RW)   Toilet transfer 5  Supervision   Additional items Increased time required;Verbal cues;Standard toilet;Other  (grab bar, RW)   Functional Mobility   Functional Mobility 5  Supervision   Additional Comments Denies dizziness/lightheadedness t/o evaluation. Household distances navigated. no gross LOB noted.   Additional items Rolling walker   Balance   Static Sitting Good   Dynamic Sitting Fair   Static Standing Fair -   Dynamic Standing Fair -   Ambulatory Fair -   Activity Tolerance   Activity Tolerance Patient tolerated treatment well   Medical Staff Made Aware AMBROSIO Martinez   Nurse Made Aware Yes   RUE Assessment   RUE Assessment WFL   LUE " Assessment   LUE Assessment WFL   Hand Function   Gross Motor Coordination Functional   Fine Motor Coordination Functional   Sensation   Light Touch No apparent deficits   Vision-Basic Assessment   Current Vision Wears glasses all the time   Vision - Complex Assessment   Ocular Range of Motion Intact   Psychosocial   Patient Behaviors/Mood Cooperative;Flat affect   Perception   Inattention/Neglect Appears intact   Cognition   Overall Cognitive Status Impaired   Arousal/Participation Alert;Responsive;Cooperative   Attention Within functional limits   Orientation Level Oriented to person;Oriented to place;Oriented to situation;Disoriented to time  ((+) month, reports 2014)   Memory Decreased recall of precautions   Following Commands Follows one step commands without difficulty   Comments dec insight.   Assessment   Limitation Decreased UE strength;Decreased ADL status;Decreased Safe judgement during ADL;Decreased cognition;Decreased endurance;Decreased self-care trans;Decreased high-level ADLs   Prognosis Fair   Assessment Patient is a 69 y.o. year old male seen for OT eval s/p admit to [facility] on 1/18/[facility]25 with DM1 w/ hyperglycemia.  OT consulted to assess ADLs/IADLs/functional mobility and assist w/ D/C planning. Patient demonstrates the following deficits impacting occupational performance: decreased strength , decreased balance, decreased activity tolerance, limited functional reach, impaired sensation, impaired memory, impaired problem solving, decreased safety awareness, hypotension, impaired coordination, and decreased cardiovascular endurance. These impairments, as well at pt’s RYAN home environment, limited home support, difficulty performing ADLs, difficulty performing IADLs, difficulty performing transfers/mobility, limited insight into deficits, fall risk , functional decline , and multiple admissions , limit pt’s ability to safely engage in all baseline areas of occupation. Pt currently requiring S for ADL  completion and functional transfers/mobility with RW use.. Pt would benefit from continued skilled OT while in acute setting to address deficits as defined above and to maximize (I) w/ ADLs/functional mobility. Occupational performance areas to address include: grooming, bathing/shower, toilet hygiene, dressing, medication management, socialization, health maintenance, functional mobility, community mobility, clothing management, money management, and household maintenance. Based on the aforementioned evaluation, functional performance deficits, and assessments, pt has been identified as a moderate complexity evaluation. At this time, recommendation for pt to receive post-acute rehabilitation services at a Level III (minimum resource intensity) due to above deficits and CLOF. OT will continue to follow pt 1-2x/wk to address the goals listed below to  w/in 10-14 days.   Goals   Patient Goals none offered   LTG Time Frame 10-14   Plan   Treatment Interventions ADL retraining;Functional transfer training;UE strengthening/ROM;Cognitive reorientation;Endurance training;Patient/family training;Equipment evaluation/education;Neuromuscular reeducation;Compensatory technique education;Continued evaluation;Activityengagement;Energy conservation   Goal Expiration Date 25   OT Treatment Day 0   OT Frequency 1-2x/wk   Discharge Recommendation   Rehab Resource Intensity Level, OT III (Minimum Resource Intensity)   AM-PAC Daily Activity Inpatient   Lower Body Dressing 3   Bathing 3   Toileting 3   Upper Body Dressing 3   Grooming 4   Eating 4   Daily Activity Raw Score 20   Daily Activity Standardized Score (Calc for Raw Score >=11) 42.03   AM-PAC Applied Cognition Inpatient   Following a Speech/Presentation 3   Understanding Ordinary Conversation 3   Taking Medications 2   Remembering Where Things Are Placed or Put Away 2   Remembering List of 4-5 Errands 2   Taking Care of Complicated Tasks 2   Applied Cognition Raw  Score 14   Applied Cognition Standardized Score 32.02     Occupational Therapy goals: In 7-14 days:     1- Patient will verbalize and demonstrate use of energy conservation/deep breathing technique and work simplification skills during functional activity with no verbal cues.   2- Patient will verbalize and demonstrate good body mechanics and joint protection techniques during ADLs/IADLs with no verbal cues   3- Pt will complete bed mobility at a Mod I level w/ G balance/safety demonstrated to decrease caregiver assistance required   4- Patient will increase OOB/ sitting tolerance to 2-4 hours per day for increased participation in self care and leisure tasks with no s/s of exertion.   5-Patient will increase standing tolerance time to 5 minutes with unilateral UE support to complete sink level ADLs@ mod I level    6- Pt will improve functional transfers to Mod I on/off all surfaces using DME as needed w/ G balance/safety   7- Patient will complete UB ADLs with Aretha utilizing appropriate DME/AE PRN   8- Patient will complete LB ADLs with Aretha utilizing appropriate DME/AE PRN   9- Patient will complete toileting tasks with Aretha with G hygiene/thoroughness utilizing appropriate DME/AE PRN   10- Pt will improve functional mobility during ADL/IADL/leisure tasks to Mod I using DME as needed w/ G balance/safety    11- Pt will be attentive 100% of the time during ongoing cognitive assessment w/ G participation to assist w/ safe d/c planning/recommendations   12- Pt will participate in simulated IADL management task to increase independence to Mod I w/ G safety and endurance   13- Pt will increase BUE strength by 1MM grade via AROM/AAROM/PROM exercises to increase independence in ADLs and transfers       BRITTANY Valdez/L

## 2025-01-20 NOTE — PLAN OF CARE
Problem: OCCUPATIONAL THERAPY ADULT  Goal: Performs self-care activities at highest level of function for planned discharge setting.  See evaluation for individualized goals.  Description: Treatment Interventions: ADL retraining, Functional transfer training, UE strengthening/ROM, Cognitive reorientation, Endurance training, Patient/family training, Equipment evaluation/education, Neuromuscular reeducation, Compensatory technique education, Continued evaluation, Activityengagement, Energy conservation          See flowsheet documentation for full assessment, interventions and recommendations.   Note: Limitation: Decreased UE strength, Decreased ADL status, Decreased Safe judgement during ADL, Decreased cognition, Decreased endurance, Decreased self-care trans, Decreased high-level ADLs  Prognosis: Fair  Assessment: Patient is a 69 y.o. year old male seen for OT eval s/p admit to [facility] on 1/18/2025 with DM1 w/ hyperglycemia.  OT consulted to assess ADLs/IADLs/functional mobility and assist w/ D/C planning. Patient demonstrates the following deficits impacting occupational performance: decreased strength , decreased balance, decreased activity tolerance, limited functional reach, impaired sensation, impaired memory, impaired problem solving, decreased safety awareness, hypotension, impaired coordination, and decreased cardiovascular endurance. These impairments, as well at pt’s [place] home environment, limited home support, difficulty performing ADLs, difficulty performing IADLs, difficulty performing transfers/mobility, limited insight into deficits, fall risk , functional decline , and multiple admissions , limit pt’s ability to safely engage in all baseline areas of occupation. Pt currently requiring S for ADL completion and functional transfers/mobility with RW use.. Pt would benefit from continued skilled OT while in acute setting to address deficits as defined above and to maximize (I) w/ ADLs/functional mobility.  Occupational performance areas to address include: grooming, bathing/shower, toilet hygiene, dressing, medication management, socialization, health maintenance, functional mobility, community mobility, clothing management, money management, and household maintenance. Based on the aforementioned evaluation, functional performance deficits, and assessments, pt has been identified as a moderate complexity evaluation. At this time, recommendation for pt to receive post-acute rehabilitation services at a Level III (minimum resource intensity) due to above deficits and CLOF. OT will continue to follow pt 1-2x/wk to address the goals listed below to  w/in 10-14 days.     Rehab Resource Intensity Level, OT: III (Minimum Resource Intensity)

## 2025-01-20 NOTE — PLAN OF CARE
Problem: Potential for Falls  Goal: Patient will remain free of falls  Description: INTERVENTIONS:  - Educate patient/family on patient safety including physical limitations  - Instruct patient to call for assistance with activity   - Consult OT/PT to assist with strengthening/mobility   - Keep Call bell within reach  - Keep bed low and locked with side rails adjusted as appropriate  - Keep care items and personal belongings within reach  - Initiate and maintain comfort rounds  - Make Fall Risk Sign visible to staff  - Offer Toileting every  Hours, in advance of need  - Initiate/Maintain alarm  - Obtain necessary fall risk management equipment:   - Apply yellow socks and bracelet for high fall risk patients  - Consider moving patient to room near nurses station  Outcome: Progressing     Problem: Prexisting or High Potential for Compromised Skin Integrity  Goal: Skin integrity is maintained or improved  Description: INTERVENTIONS:  - Identify patients at risk for skin breakdown  - Assess and monitor skin integrity  - Assess and monitor nutrition and hydration status  - Monitor labs   - Assess for incontinence   - Turn and reposition patient  - Assist with mobility/ambulation  - Relieve pressure over bony prominences  - Avoid friction and shearing  - Provide appropriate hygiene as needed including keeping skin clean and dry  - Evaluate need for skin moisturizer/barrier cream  - Collaborate with interdisciplinary team   - Patient/family teaching  - Consider wound care consult   Outcome: Progressing     Problem: PAIN - ADULT  Goal: Verbalizes/displays adequate comfort level or baseline comfort level  Description: Interventions:  - Encourage patient to monitor pain and request assistance  - Assess pain using appropriate pain scale  - Administer analgesics based on type and severity of pain and evaluate response  - Implement non-pharmacological measures as appropriate and evaluate response  - Consider cultural and  social influences on pain and pain management  - Notify physician/advanced practitioner if interventions unsuccessful or patient reports new pain  Outcome: Progressing     Problem: INFECTION - ADULT  Goal: Absence or prevention of progression during hospitalization  Description: INTERVENTIONS:  - Assess and monitor for signs and symptoms of infection  - Monitor lab/diagnostic results  - Monitor all insertion sites, i.e. indwelling lines, tubes, and drains  - Monitor endotracheal if appropriate and nasal secretions for changes in amount and color  - [place] appropriate cooling/warming therapies per order  - Administer medications as ordered  - Instruct and encourage patient and family to use good hand hygiene technique  - Identify and instruct in appropriate isolation precautions for identified infection/condition  Outcome: Progressing     Problem: SAFETY ADULT  Goal: Patient will remain free of falls  Description: INTERVENTIONS:  - Educate patient/family on patient safety including physical limitations  - Instruct patient to call for assistance with activity   - Consult OT/PT to assist with strengthening/mobility   - Keep Call bell within reach  - Keep bed low and locked with side rails adjusted as appropriate  - Keep care items and personal belongings within reach  - Initiate and maintain comfort rounds  - Make Fall Risk Sign visible to staff  - Offer Toileting every  Hours, in advance of need  - Initiate/Maintain alarm  - Obtain necessary fall risk management equipment:   - Apply yellow socks and bracelet for high fall risk patients  - Consider moving patient to room near nurses station  Outcome: Progressing  Goal: Maintain or return to baseline ADL function  Description: INTERVENTIONS:  -  Assess patient's ability to carry out ADLs; assess patient's baseline for ADL function and identify physical deficits which impact ability to perform ADLs (bathing, care of mouth/teeth, toileting, grooming, dressing, etc.)  -  Assess/evaluate cause of self-care deficits   - Assess range of motion  - Assess patient's mobility; develop plan if impaired  - Assess patient's need for assistive devices and provide as appropriate  - Encourage maximum independence but intervene and supervise when necessary  - Involve family in performance of ADLs  - Assess for home care needs following discharge   - Consider OT consult to assist with ADL evaluation and planning for discharge  - Provide patient education as appropriate  Outcome: Progressing  Goal: Maintains/Returns to pre admission functional level  Description: INTERVENTIONS:  - Perform AM-PAC 6 Click Basic Mobility/ Daily Activity assessment daily.  - Set and communicate daily mobility goal to care team and patient/family/caregiver.   - Collaborate with rehabilitation services on mobility goals if consulted  - Perform Range of Motion  times a day.  - Reposition patient every  hours.  - Dangle patient times a day  - Stand patient  times a day  - Ambulate patient times a day  - Out of bed to chair  times a day   - Out of bed for meals  times a day  - Out of bed for toileting  - Record patient progress and toleration of activity level   Outcome: Progressing     Problem: DISCHARGE PLANNING  Goal: Discharge to home or other facility with appropriate resources  Description: INTERVENTIONS:  - Identify barriers to discharge w/patient and caregiver  - Arrange for needed discharge resources and transportation as appropriate  - Identify discharge learning needs (meds, wound care, etc.)  - Arrange for interpretive services to assist at discharge as needed  - Refer to Case Management Department for coordinating discharge planning if the patient needs post-hospital services based on physician/advanced practitioner order or complex needs related to functional status, cognitive ability, or social support system  Outcome: Progressing     Problem: Knowledge Deficit  Goal: Patient/family/caregiver demonstrates  understanding of disease process, treatment plan, medications, and discharge instructions  Description: Complete learning assessment and assess knowledge base.  Interventions:  - Provide teaching at level of understanding  - Provide teaching via preferred learning methods  Outcome: Progressing

## 2025-01-20 NOTE — APP STUDENT NOTE
"KIM STUDENT  Inpatient Progress Note for TRAINING ONLY  Not Part of Legal Medical Record       Progress Note - Karson You 69 y.o. male MRN: [id number]    Unit/Bed#: E4 -01 Encounter: [id number]      Assessment & Plan:   Type 1 Diabetes Mellitus with hyperglycemia   POCT 182   Potassium 2.8   Oral potassium chloride supplement - 100 mEq po daily   Continue monitoring POCT q 2 hrs   Continue insulin glargine 18 units @ night + insulin lispro 6 units TID before meals   Keep patient on carbohydrate diet   Atrial fibrillation   Rate & rhythm under control   Continue maintenance medications - eliquis 5 mg   Adrenal insufficiency   Steroid therapy to be continued   Continue fludrocortisone 0.2 mg & hydrocortisone 15 mg + 20 mg   Monitor POCT for elevation in sugars     Subjective:   Patient is a 69 year old male with a PMH of type 1 diabetes, afib, and adrenal insufficiency was admitted to the hospital for hyperglycemia. Patient states that his sugars often fluctuate and get high often but he usually resolves it by drinking water. When patient came to the ED, sugars were in the 500s. Patient is on chronic steroids for adrenal insufficiency and patients daughter had reported that he has brittle diabetes. Patient denies abdominal pain, chest pain, SOB; no issues passing bowel movements or urinating.     Objective:     Vitals: Blood pressure (!) 177/79, pulse 74, temperature 97.8 °F (36.6 °C), temperature source Temporal, resp. rate 20, height 5' 4\" (1.626 m), weight 87.1 kg (192 lb 0.3 oz), SpO2 96%.,Body mass index is 32.96 kg/m².      Intake/Output Summary (Last 24 hours) at 1/20/2025 0920  Last data filed at 1/20/2025 0730  Gross per 24 hour   Intake 620 ml   Output 1200 ml   Net -580 ml       Physical Exam: {Exam, Complete:16385}     Invasive Devices       Peripheral Intravenous Line  Duration             Peripheral IV 01/19/25 Left;Proximal;Ventral (anterior) Forearm <1 day    Peripheral IV 01/19/25 " "Right;Ventral (anterior) Forearm <1 day                    Lab, Imaging and other studies: {Results Review Statement:05076::\"No pertinent imaging studies reviewed.\"}  VTE Pharmacologic Prophylaxis: {Pharmacologic VTE Prophylaxis:745670607}  VTE Mechanical Prophylaxis: {Mechanical VTE Prophylaxis:69734}    "

## 2025-01-20 NOTE — TELEMEDICINE
Administrative Statements   VIRTUAL CARE DOCUMENTATION:     1. This service was provided via Telemedicine using Teams Virtual Rounding      2. Parties in the room with patient during teleconsult Patient only    3. Confidentiality My office door was closed     4. Participants No one else was in the room    5. Patient acknowledged consent and understanding of privacy and security of the  Telemedicine consult. I informed the patient that I have reviewed their record in Epic and presented the opportunity for them to ask any questions regarding the visit today.  The patient agreed to participate.    6. I have spent a total time of 30 minutes in caring for this patient on the day of the visit/encounter including Diagnostic results, Risks and benefits of tx options, Instructions for management, Patient and family education, Impressions, Counseling / Coordination of care, Documenting in the medical record, Reviewing / ordering tests, medicine, procedures  , and Obtaining or reviewing history  , not including the time spent for establishing the audio/video connection.      Consultation - Karson You 69 y.o. male MRN: [id number]    Unit/Bed#: E4 -01 Encounter: [id number]      Assessment & Plan   69-year-old male with longstanding history of type 1 diabetes, adrenal insufficiency, orthostatic hypotension who was initially admitted to the hospital for hyperglycemia-since admission he has had episodes of both hypo and hyperglycemia  For now continue Lantus 18 units at bedtime-increase Humalog to 8 units before meals, start low-dose correctional scale before meals and bedtime  He would benefit from restarting his personal CGM as outpatient  For adrenal insufficiency continue hydrocortisone and Florinef  Hypokalemia-being supplemented by primary team      CC: Diabetes Consult    History of Present Illness     HPI: Karson You is a 69 y.o. year old male with type 1 DM , adrenal insufficiency   Hospitalized [facility] 18 for  "hyperglycemia after he was sent in from rehab-  [facility] was increased in January 19 and he was hypoglycemic yesterday.  Patient has no acute complaints-appetite ok, no nausea , vomiting , no blurry vision        Regimen from last outpatient note nov 2024 -  \"[id number] units q.h.s.  Humalog KwikPen 8-8-8 units with each meal plus correctional 1u/50mg/dL.\"          Inpatient consult to Endocrinology  Consult performed by: Yaquelin Serrano MD  Consult ordered by: Ras Alatorre DO          Review of Systems    Historical Information   Past Medical History:   Diagnosis Date    A-fib (HCC)     Adrenal insufficiency (HCC)     Atrial fibrillation (HCC)     Diabetes mellitus (HCC)     History of stroke 10/19/2022    Obesity, morbid (HCC) 11/14/2022    Shock (HCC) 12/14/2023    Stroke (HCC)     Type 2 MI (myocardial infarction) (HCC) 11/03/2022     History reviewed. No pertinent surgical history.  Social History   Social History     Substance and Sexual Activity   Alcohol Use Not Currently    Alcohol/week: 5.0 standard drinks of alcohol    Types: 5 Cans of beer per week    Comment: Quit in august 2022     Social History     Substance and Sexual Activity   Drug Use Never     Social History     Tobacco Use   Smoking Status Former    Current packs/day: 0.25    Average packs/day: 0.3 packs/day for 30.0 years (7.5 ttl pk-yrs)    Types: Cigarettes   Smokeless Tobacco Never     Family History:   Family History   Problem Relation Age of Onset    Heart disease Mother     Cancer Father     Multiple sclerosis Sister        Meds/Allergies   Current Facility-Administered Medications   Medication Dose Route Frequency Provider Last Rate Last Admin    acetaminophen (TYLENOL) tablet 975 mg  975 mg Oral Q8H PRN Jorge A Harper PA-C        aluminum-magnesium hydroxide-simethicone (MAALOX) oral suspension 30 mL  30 mL Oral Q6H PRN Jorge A Harper PA-C        apixaban (ELIQUIS) tablet 5 mg  5 mg Oral BID Jorge A Harper PA-C   5 mg at 01/20/25 0815    " "aspirin chewable tablet 81 mg  81 mg Oral Daily Jorge A Harper PA-C   81 mg at 01/20/25 0815    atorvastatin (LIPITOR) tablet 10 mg  10 mg Oral Daily With Dinner Mendel Griffin MD   10 mg at 01/19/25 1625    fludrocortisone (FLORINEF) tablet 0.2 mg  0.2 mg Oral Daily Jorge A Harper PA-C   0.2 mg at 01/20/25 0815    hydrALAZINE (APRESOLINE) injection 5 mg  5 mg Intravenous Q6H PRN Jorge A Harper PA-C   5 mg at 01/20/25 0109    hydrocortisone (CORTEF) tablet 15 mg  15 mg Oral Daily Jorge A Harper PA-C   15 mg at 01/19/25 1424    hydrocortisone (CORTEF) tablet 20 mg  20 mg Oral QAM Jorge A Harper PA-C   20 mg at 01/20/25 0815    insulin glargine (LANTUS) subcutaneous injection 18 Units 0.18 mL  18 Units Subcutaneous HS Heena Garcia MD   18 Units at 01/19/25 2132    insulin lispro (HumALOG/ADMELOG) 100 units/mL subcutaneous injection 6 Units  6 Units Subcutaneous TID With Meals Heena Garcia MD   6 Units at 01/20/25 1238    magnesium Oxide (MAG-OX) tablet 400 mg  400 mg Oral BID Ras Alatorre,         magnesium sulfate 2 g/50 mL IVPB (premix) 2 g  2 g Intravenous Once Ras Alatorre DO 25 mL/hr at 01/20/25 1350 2 g at 01/20/25 1350    midodrine (PROAMATINE) tablet 5 mg  5 mg Oral TID AC Jorge A Harper PA-C   5 mg at 01/19/25 1625    ondansetron (ZOFRAN) injection 4 mg  4 mg Intravenous Q6H PRN Jorge A Harper PA-C        polyethylene glycol (MIRALAX) packet 17 g  17 g Oral Daily PRN Jorge A Harper PA-C        potassium chloride (Klor-Con M20) CR tablet 40 mEq  40 mEq Oral Q4H Ras Alatorre DO   40 mEq at 01/20/25 1349     Allergies   Allergen Reactions    Imipramine Other (See Comments)    Lisinopril Other (See Comments)    Paroxetine Other (See Comments)    Penicillins Hives    Rosiglitazone Other (See Comments)    Troglitazone Other (See Comments)       Objective   Vitals: Blood pressure (!) 177/79, pulse 74, temperature 97.8 °F (36.6 °C), temperature source Temporal, resp. rate 20, height 5' 4\" (1.626 m), weight 87.1 kg " "(192 lb 0.3 oz), SpO2 96%.    Intake/Output Summary (Last 24 hours) at 1/20/2025 1415  Last data filed at 1/20/2025 0730  Gross per 24 hour   Intake 620 ml   Output 1200 ml   Net -580 ml     Invasive Devices       Peripheral Intravenous Line  Duration             Peripheral IV 01/19/25 Left;Proximal;Ventral (anterior) Forearm 1 day    Peripheral IV 01/19/25 Right;Ventral (anterior) Forearm 1 day                    Physical Exam  Vitals reviewed.   Constitutional:       General: He is not in acute distress.     Appearance: He is obese. He is not ill-appearing, toxic-appearing or diaphoretic.   HENT:      Head: Normocephalic and atraumatic.   Pulmonary:      Effort: Pulmonary effort is normal. No respiratory distress.   Neurological:      Mental Status: He is alert.   Psychiatric:         Mood and Affect: Mood normal.         Behavior: Behavior normal.         The history was obtained from the review of the chart, patient.    Lab Results:   Results from last 7 days   Lab Units 01/19/25  0457   HEMOGLOBIN A1C % 8.3*     Lab Results   Component Value Date    WBC 9.65 01/20/2025    HGB 11.0 (L) 01/20/2025    HCT 33.0 (L) 01/20/2025    MCV 96 01/20/2025     01/20/2025     Lab Results   Component Value Date/Time    BUN 8 01/20/2025 05:13 AM    BUN 20 01/05/2024 04:50 AM    K 2.8 (L) 01/20/2025 05:13 AM    K 2.8 (L) 01/05/2024 04:50 AM     01/20/2025 05:13 AM     01/05/2024 04:50 AM    CO2 26 01/20/2025 05:13 AM    CO2 30 01/05/2024 04:50 AM    CREATININE 0.87 01/20/2025 05:13 AM    CREATININE 1.06 01/05/2024 04:50 AM    AST 13 01/18/2025 03:07 PM    AST 14 01/02/2024 10:40 AM    ALT 20 01/18/2025 03:07 PM    ALT 16 01/02/2024 10:40 AM    TP 6.3 (L) 01/18/2025 03:07 PM    TP 5.2 (L) 01/02/2024 10:40 AM    ALB 3.5 01/18/2025 03:07 PM    ALB 3.1 (L) 01/02/2024 10:40 AM     No results for input(s): \"CHOL\", \"HDL\", \"LDL\", \"TRIG\", \"VLDL\" in the last 72 hours.  No results found for: \"MICROALBUR\", " "\"[id number]\"  POC Glucose (mg/dl)   Date Value   01/20/2025 150 (H)   01/20/2025 182 (H)   01/19/2025 207 (H)   01/19/2025 170 (H)   01/19/2025 87   01/19/2025 60 (L)   01/19/2025 59 (L)   01/19/2025 90   01/18/2025 370 (H)   01/18/2025 348 (H)     Results from last 7 days   Lab Units 01/20/25  0513 01/19/25  1704 01/19/25  0457 01/18/25  1507   SODIUM mmol/L 140  --  140 134*   POTASSIUM mmol/L 2.8* 3.4* 2.7* 3.6   CHLORIDE mmol/L 108  --  109* 100   CO2 mmol/L 26  --  26 23   BUN mg/dL 8  --  11 16   CREATININE mg/dL 0.87  --  0.91 1.10   CALCIUM mg/dL 7.3*  --  7.6* 8.0*      Imaging Studies: Results Review Statement: No pertinent imaging studies reviewed.    Portions of the record may have been created with voice recognition software.   "

## 2025-01-20 NOTE — PROGRESS NOTES
Progress Note - Hospitalist   Name: Karson You 69 y.o. male I MRN: [id number]  Unit/Bed#: E4 -01 I Date of Admission: 1/18/2025   Date of Service: 1/20/2025 I Hospital Day: 1    Assessment & Plan  Type 1 diabetes mellitus with hyperglycemia (HCC)  History of brittle diabetes type 1, adrenal insufficiency, orthostatic hypotension, paroxysmal atrial fibrillation who presents to the hospital from SNF for hyperglycemia  Patient does have hyper and hypoglycemia.  Seen by endocrinology today  Currently on: Glargine 18 units with lispro 6 units 3 times daily    Results from last 7 days   Lab Units 01/20/25  1604 01/20/25  1158 01/20/25  0755 01/19/25  2054 01/19/25  1529 01/19/25  1240   POC GLUCOSE mg/dl 136 150* 182* 207* 170* 87     Paroxysmal atrial fibrillation   Paroxysmal atrial fibrillation rate controlled  Anticoagulation: Continue apixaban  Adrenal insufficiency (HCC)  Follows with endocrinology as an outpatient on hydrocortisone and fludrocortisone  Orthostatic hypotension  Orthostatic hypotension likely related to autonomic dysfunction from longstanding history of diabetes mellitus  Continue compression stockings and abdminal binder  Continue midodrine  Chronic diastolic heart failure (HCC)  Wt Readings from Last 3 Encounters:   01/19/25 87.1 kg (192 lb 0.3 oz)   01/17/25 89.5 kg (197 lb 5 oz)   09/05/24 77.8 kg (171 lb 9.6 oz)     On furosemide as needed for weight gain.  Does have increased edema  If potassium stable will restart furosemide tomorrow  Hypokalemia  Hypokalemia and hypomagnesemia will further replace and recheck tomorrow    Results from last 7 days   Lab Units 01/20/25  0513 01/19/25  1704 01/19/25  0457 01/18/25  1507 01/17/25 2022   POTASSIUM mmol/L 2.8* 3.4* 2.7* 3.6 3.6   MAGNESIUM mg/dL 1.8*  --   --  1.8* 1.8*       VTE Pharmacologic Prophylaxis: VTE Score: 3 Moderate Risk (Score 3-4) - Pharmacological DVT Prophylaxis Ordered: apixaban (Eliquis).    Mobility:   Basic Mobility  Inpatient Raw Score: 22  JH-HLM Goal: 7: Walk 25 feet or more  JH-HLM Achieved: 3: Sit at edge of bed  JH-HLM Goal NOT achieved. Continue with multidisciplinary rounding and encourage appropriate mobility to improve upon JH-HLM goals.    Patient Centered Rounds: I have performed bedside rounds with nursing staff today.  Discussions with Specialists or Other Care Team Provider: Case management and endocrinology    Education and Discussions with Family / Patient: Updated  (daughter) via phone.    Current Length of Stay: 1 day(s)  Current Patient Status: Inpatient   Certification Statement: The patient will continue to require additional inpatient hospital stay due to electrolyte abnormalities  Discharge Plan: Anticipate discharge in 24-48 hrs to discharge location to be determined pending rehab evaluations.    Code Status: Level 1 - Full Code    Subjective   Patient seen and examined.  No new complaints.    Objective   Vitals:   Temp (24hrs), Av °F (36.7 °C), Min:97.8 °F (36.6 °C), Max:98.3 °F (36.8 °C)    Temp:  [97.8 °F (36.6 °C)-98.3 °F (36.8 °C)] 98.3 °F (36.8 °C)  HR:  [74-82] 74  Resp:  [20] 20  BP: (142-199)/(74-99) 142/74  SpO2:  [96 %-98 %] 98 %  Body mass index is 32.96 kg/m².     Input and Output Summary (last 24 hours):     Intake/Output Summary (Last 24 hours) at 2025 1858  Last data filed at 2025 1500  Gross per 24 hour   Intake 480 ml   Output 1500 ml   Net -1020 ml       Physical Exam  Vitals reviewed.   Constitutional:       General: He is not in acute distress.  HENT:      Head: Atraumatic.   Cardiovascular:      Rate and Rhythm: Regular rhythm.      Heart sounds: Normal heart sounds.   Pulmonary:      Effort: Pulmonary effort is normal.      Breath sounds: Decreased breath sounds present. No wheezing.   Abdominal:      General: Bowel sounds are normal.      Palpations: Abdomen is soft.      Tenderness: There is no abdominal tenderness.   Musculoskeletal:         General:  No swelling or tenderness.   Skin:     General: Skin is warm and dry.   Neurological:      General: No focal deficit present.      Mental Status: He is alert.      Motor: No weakness.   Psychiatric:         Mood and Affect: Mood normal.       Lines/Drains:  Invasive Devices       Peripheral Intravenous Line  Duration             Peripheral IV 01/19/25 Left;Proximal;Ventral (anterior) Forearm 1 day    Peripheral IV 01/19/25 Right;Ventral (anterior) Forearm 1 day                        Lab Results: I have reviewed the following results:   Results from last 7 days   Lab Units 01/20/25  0513 01/19/25 0457 01/18/25  1507   WBC Thousand/uL 9.65 9.96 9.58   HEMOGLOBIN g/dL 11.0* 10.1* 11.3*   PLATELETS Thousands/uL 209 199 216   MCV fL 96 95 97     Results from last 7 days   Lab Units 01/20/25  0513 01/19/25  1704 01/19/25  0457 01/18/25  1507 01/17/25 2022   SODIUM mmol/L 140  --  140 134* 134*   POTASSIUM mmol/L 2.8* 3.4* 2.7* 3.6 3.6   CHLORIDE mmol/L 108  --  109* 100 99   CO2 mmol/L 26  --  26 23 26   ANION GAP mmol/L 6  --  5 11 9   BUN mg/dL 8  --  11 16 19   CREATININE mg/dL 0.87  --  0.91 1.10 1.23   CALCIUM mg/dL 7.3*  --  7.6* 8.0* 7.8*   ALBUMIN g/dL  --   --   --  3.5 3.3*   TOTAL BILIRUBIN mg/dL  --   --   --  0.66 0.53   ALK PHOS U/L  --   --   --  99 110*   ALT U/L  --   --   --  20 26   AST U/L  --   --   --  13 29   EGFR ml/min/1.73sq m 89  --  86 69 60   GLUCOSE RANDOM mg/dL 220*  --  135 582* 576*     Results from last 7 days   Lab Units 01/20/25  0513 01/18/25  1507 01/17/25 2022   MAGNESIUM mg/dL 1.8* 1.8* 1.8*   PHOSPHORUS mg/dL  --   --  2.6            Results from last 7 days   Lab Units 01/17/25  2250 01/17/25 2022   LACTIC ACID mmol/L 2.7* 3.5*     Results from last 7 days   Lab Units 01/20/25  1604 01/20/25  1158 01/20/25  0755 01/19/25  2054 01/19/25  1529 01/19/25  1240 01/19/25  1214 01/19/25  1150 01/19/25  0812 01/18/25  2233 01/18/25  2115 01/18/25 2015   POC GLUCOSE mg/dl 136 150*  182* 207* 170* 87 60* 59* 90 370* 348* 325*     Results from last 7 days   Lab Units 01/19/25  0457   HEMOGLOBIN A1C % 8.3*     Results from last 7 days   Lab Units 01/18/25  1507   TSH 3RD GENERATON uIU/mL 0.828       Recent Cultures (last 7 days):         Imaging:  Reviewed radiology reports from this admission including:  No results found.    Last 24 Hours Medication List:     Current Facility-Administered Medications:     acetaminophen (TYLENOL) tablet 975 mg, Q8H PRN    aluminum-magnesium hydroxide-simethicone (MAALOX) oral suspension 30 mL, Q6H PRN    apixaban (ELIQUIS) tablet 5 mg, BID    aspirin chewable tablet 81 mg, Daily    atorvastatin (LIPITOR) tablet 10 mg, Daily With Dinner    fludrocortisone (FLORINEF) tablet 0.2 mg, Daily    hydrALAZINE (APRESOLINE) injection 5 mg, Q6H PRN    hydrocortisone (CORTEF) tablet 15 mg, Daily    hydrocortisone (CORTEF) tablet 20 mg, QAM    insulin glargine (LANTUS) subcutaneous injection 18 Units 0.18 mL, HS    insulin lispro (HumALOG/ADMELOG) 100 units/mL subcutaneous injection 6 Units, TID With Meals    magnesium Oxide (MAG-OX) tablet 400 mg, BID    midodrine (PROAMATINE) tablet 5 mg, TID AC    ondansetron (ZOFRAN) injection 4 mg, Q6H PRN    polyethylene glycol (MIRALAX) packet 17 g, Daily PRN    Administrative Statements   Today, Patient Was Seen By: Ras Alatorre, DO  I have spent a total time of 35 minutes in caring for this patient on the day of the visit/encounter including Diagnostic results, Patient and family education, Documenting in the medical record, Reviewing / ordering tests, medicine, procedures  , Obtaining or reviewing history  , and Communicating with other healthcare professionals .    **Please Note: This note may have been constructed using a voice recognition system.**

## 2025-01-21 VITALS
RESPIRATION RATE: 20 BRPM | OXYGEN SATURATION: 98 % | HEIGHT: 64 IN | DIASTOLIC BLOOD PRESSURE: 88 MMHG | WEIGHT: 192.02 LBS | HEART RATE: 73 BPM | SYSTOLIC BLOOD PRESSURE: 181 MMHG | TEMPERATURE: 97.4 F | BODY MASS INDEX: 32.78 KG/M2

## 2025-01-21 LAB
ALBUMIN SERPL BCG-MCNC: 2.9 G/DL (ref 3.5–5)
ALP SERPL-CCNC: 67 U/L (ref 34–104)
ALT SERPL W P-5'-P-CCNC: 19 U/L (ref 7–52)
ANION GAP SERPL CALCULATED.3IONS-SCNC: 4 MMOL/L (ref 4–13)
AST SERPL W P-5'-P-CCNC: 19 U/L (ref 13–39)
BILIRUB SERPL-MCNC: 0.51 MG/DL (ref 0.2–1)
BUN SERPL-MCNC: 8 MG/DL (ref 5–25)
CALCIUM ALBUM COR SERPL-MCNC: 8.4 MG/DL (ref 8.3–10.1)
CALCIUM SERPL-MCNC: 7.5 MG/DL (ref 8.4–10.2)
CHLORIDE SERPL-SCNC: 108 MMOL/L (ref 96–108)
CO2 SERPL-SCNC: 28 MMOL/L (ref 21–32)
CREAT SERPL-MCNC: 0.9 MG/DL (ref 0.6–1.3)
ERYTHROCYTE [DISTWIDTH] IN BLOOD BY AUTOMATED COUNT: 14.7 % (ref 11.6–15.1)
GFR SERPL CREATININE-BSD FRML MDRD: 88 ML/MIN/1.73SQ M
GLUCOSE SERPL-MCNC: 139 MG/DL (ref 65–140)
GLUCOSE SERPL-MCNC: 313 MG/DL (ref 65–140)
GLUCOSE SERPL-MCNC: 79 MG/DL (ref 65–140)
GLUCOSE SERPL-MCNC: 99 MG/DL (ref 65–140)
HCT VFR BLD AUTO: 33 % (ref 36.5–49.3)
HGB BLD-MCNC: 10.8 G/DL (ref 12–17)
MAGNESIUM SERPL-MCNC: 2 MG/DL (ref 1.9–2.7)
MCH RBC QN AUTO: 32.1 PG (ref 26.8–34.3)
MCHC RBC AUTO-ENTMCNC: 32.7 G/DL (ref 31.4–37.4)
MCV RBC AUTO: 98 FL (ref 82–98)
PHOSPHATE SERPL-MCNC: 2.9 MG/DL (ref 2.3–4.1)
PLATELET # BLD AUTO: 209 THOUSANDS/UL (ref 149–390)
PMV BLD AUTO: 9.8 FL (ref 8.9–12.7)
POTASSIUM SERPL-SCNC: 3.5 MMOL/L (ref 3.5–5.3)
PROT SERPL-MCNC: 5.3 G/DL (ref 6.4–8.4)
RBC # BLD AUTO: 3.36 MILLION/UL (ref 3.88–5.62)
SODIUM SERPL-SCNC: 140 MMOL/L (ref 135–147)
WBC # BLD AUTO: 10.9 THOUSAND/UL (ref 4.31–10.16)

## 2025-01-21 PROCEDURE — 82948 REAGENT STRIP/BLOOD GLUCOSE: CPT

## 2025-01-21 PROCEDURE — 84100 ASSAY OF PHOSPHORUS: CPT | Performed by: INTERNAL MEDICINE

## 2025-01-21 PROCEDURE — 99239 HOSP IP/OBS DSCHRG MGMT >30: CPT | Performed by: INTERNAL MEDICINE

## 2025-01-21 PROCEDURE — 85027 COMPLETE CBC AUTOMATED: CPT | Performed by: INTERNAL MEDICINE

## 2025-01-21 PROCEDURE — 97161 PT EVAL LOW COMPLEX 20 MIN: CPT

## 2025-01-21 PROCEDURE — 80053 COMPREHEN METABOLIC PANEL: CPT | Performed by: INTERNAL MEDICINE

## 2025-01-21 PROCEDURE — 83735 ASSAY OF MAGNESIUM: CPT | Performed by: INTERNAL MEDICINE

## 2025-01-21 RX ORDER — FUROSEMIDE 20 MG/1
20 TABLET ORAL DAILY
Qty: 30 TABLET | Refills: 2 | Status: SHIPPED | OUTPATIENT
Start: 2025-01-22

## 2025-01-21 RX ORDER — INSULIN GLARGINE 300 U/ML
18 INJECTION, SOLUTION SUBCUTANEOUS
COMMUNITY
Start: 2025-01-21

## 2025-01-21 RX ORDER — INSULIN LISPRO 100 [IU]/ML
6 INJECTION, SOLUTION INTRAVENOUS; SUBCUTANEOUS
COMMUNITY
Start: 2025-01-21

## 2025-01-21 RX ORDER — POTASSIUM CHLORIDE 1500 MG/1
40 TABLET, EXTENDED RELEASE ORAL ONCE
Status: COMPLETED | OUTPATIENT
Start: 2025-01-21 | End: 2025-01-21

## 2025-01-21 RX ORDER — POTASSIUM CHLORIDE 750 MG/1
10 TABLET, EXTENDED RELEASE ORAL DAILY
Qty: 30 TABLET | Refills: 0 | Status: SHIPPED | OUTPATIENT
Start: 2025-01-21

## 2025-01-21 RX ORDER — FUROSEMIDE 20 MG/1
20 TABLET ORAL DAILY
Status: DISCONTINUED | OUTPATIENT
Start: 2025-01-21 | End: 2025-01-21 | Stop reason: HOSPADM

## 2025-01-21 RX ADMIN — FLUDROCORTISONE ACETATE 0.2 MG: 0.1 TABLET ORAL at 08:12

## 2025-01-21 RX ADMIN — FUROSEMIDE 20 MG: 20 TABLET ORAL at 09:36

## 2025-01-21 RX ADMIN — ASPIRIN 81 MG CHEWABLE TABLET 81 MG: 81 TABLET CHEWABLE at 08:10

## 2025-01-21 RX ADMIN — HYDROCORTISONE 20 MG: 10 TABLET ORAL at 08:12

## 2025-01-21 RX ADMIN — INSULIN LISPRO 6 UNITS: 100 INJECTION, SOLUTION INTRAVENOUS; SUBCUTANEOUS at 08:15

## 2025-01-21 RX ADMIN — Medication 400 MG: at 08:10

## 2025-01-21 RX ADMIN — POTASSIUM CHLORIDE 40 MEQ: 1500 TABLET, EXTENDED RELEASE ORAL at 09:37

## 2025-01-21 RX ADMIN — APIXABAN 5 MG: 5 TABLET, FILM COATED ORAL at 08:10

## 2025-01-21 NOTE — ASSESSMENT & PLAN NOTE
Hypokalemia and hypomagnesemia will be replaced prior to discharge  Since he is being discharged on furosemide 20 mg daily, a prescription for potassium 10 mill equivalents will also be sent to the pharmacy.    Results from last 7 days   Lab Units 01/21/25  0448 01/20/25  0513 01/19/25  1704 01/19/25  0457 01/18/25  1507 01/17/25 2022   POTASSIUM mmol/L 3.5 2.8* 3.4* 2.7* 3.6 3.6   MAGNESIUM mg/dL 2.0 1.8*  --   --  1.8* 1.8*

## 2025-01-21 NOTE — ASSESSMENT & PLAN NOTE
Hypokalemia and hypomagnesemia will further replace and recheck tomorrow    Results from last 7 days   Lab Units 01/20/25  0513 01/19/25  1704 01/19/25  0457 01/18/25  1507 01/17/25 2022   POTASSIUM mmol/L 2.8* 3.4* 2.7* 3.6 3.6   MAGNESIUM mg/dL 1.8*  --   --  1.8* 1.8*

## 2025-01-21 NOTE — APP STUDENT NOTE
KIM STUDENT  Inpatient Progress Note for TRAINING ONLY  Not Part of Legal Medical Record       Progress Note - Karson You 69 y.o. male MRN: [id number]    Unit/Bed#: E4 -01 Encounter: [id number]      Assessment & Plan:   Brittle type 1 diabetes mellitus   Last POCT was 99 - sugars have been stable since 01/19   Receiving 18 units of lantus at night and 6 units of Humalog 3 x daily before meals   On carbohydrate diet   Sugars have been stable, patient is medically cleared to be discharged today  Hypokalemia and hypomagnesemia   Potassium has improved since yesterday - was 2.8 and is now 3.5   Magnesium has improved since yesterday - was 1.8 and is now 2   Discontinue K-Cl 40 mEq   Discontinue Mag-Ox 400 mg   Adrenal insufficiency   Continue steroid therapy as directed by endocrinology   Hydrocortisone 15 mg + 20 mg tablets   Fludrocortisone 0.2 mg tablet   Paroxysmal atrial fibrillation   Rate and rhythm controlled - continue Eliquis 5 mg   Diastolic congestive heart failure   Potassium is stable at 3.5   Restart furosemide 40 mg   Orthostatic hypotension   Last recorded BP at 181/88   Stop midodrine but leave compression stockings on     Subjective:   Patient is a 69 year old male with a PMH of brittle type 1 diabetes, atrial fibrillation, and adrenal insufficiency admitted for hyperglycemia. Since admission, patient's sugars have been well controlled and in the 100s-200s. Patient is on chronic steroid use for adrenal insufficiency. Patient's potassium and magnesium levels were low yesterday but have since been corrected. Patient was sitting comfortably in bed, in good spirits, claiming he would like to go home. Denies abdominal pain or changes in bowel movements. He is AAO x 3. Patient has an appetite and is able to keep food down. Last POC glucose was 99.     Objective:     Vitals: Blood pressure (!) 181/88, pulse 73, temperature (!) 97.4 °F (36.3 °C), temperature source Temporal, resp. rate 20, height 5'  "4\" (1.626 m), weight 87.1 kg (192 lb 0.3 oz), SpO2 98%.,Body mass index is 32.96 kg/m².      Intake/Output Summary (Last 24 hours) at 1/21/2025 0856  Last data filed at 1/21/2025 0746  Gross per 24 hour   Intake 240 ml   Output 1300 ml   Net -1060 ml       Physical Exam: {Exam, Complete:84707}     Invasive Devices       Peripheral Intravenous Line  Duration             Peripheral IV 01/19/25 Left;Proximal;Ventral (anterior) Forearm 1 day    Peripheral IV 01/19/25 Right;Ventral (anterior) Forearm 1 day                    Lab, Imaging and other studies: {Results Review Statement:08460::\"No pertinent imaging studies reviewed.\"}  VTE Pharmacologic Prophylaxis: {Pharmacologic VTE Prophylaxis:820306652}  VTE Mechanical Prophylaxis: {Mechanical VTE Prophylaxis:45290}    "

## 2025-01-21 NOTE — DISCHARGE SUMMARY
Discharge Summary - Hospitalist   Name: Karson You 69 y.o. male I MRN: [id number]  Unit/Bed#: E4 -01 I Date of Admission: 1/18/2025   Date of Service: 1/21/2025 I Hospital Day: 2    Assessment & Plan  Type 1 diabetes mellitus with hyperglycemia (HCC)  History of brittle diabetes type 1, adrenal insufficiency, orthostatic hypotension, paroxysmal atrial fibrillation who presents to the hospital from SNF for hyperglycemia  Patient does have hyper and hypoglycemia.  Seen by endocrinology  Currently on: Glargine 18 units with lispro 6 units 3 times daily.  Follow-up with endocrine postdischarge.    Results from last 7 days   Lab Units 01/21/25  1139 01/21/25  0747 01/20/25  2125 01/20/25  1604 01/20/25  1158 01/20/25  0755   POC GLUCOSE mg/dl 79 99 174* 136 150* 182*     Paroxysmal atrial fibrillation   Paroxysmal atrial fibrillation rate controlled  Anticoagulation: Continue apixaban  Adrenal insufficiency (HCC)  Follows with endocrinology as an outpatient on hydrocortisone and fludrocortisone  Orthostatic hypotension  Orthostatic hypotension likely related to autonomic dysfunction from longstanding history of diabetes mellitus  Continue compression stockings and abdminal binder  Continue midodrine with hold parameters SBP greater than 130.  The patient does have significant supine hypertension.  Chronic diastolic heart failure (HCC)  Wt Readings from Last 3 Encounters:   01/19/25 87.1 kg (192 lb 0.3 oz)   01/17/25 89.5 kg (197 lb 5 oz)   09/05/24 77.8 kg (171 lb 9.6 oz)     On furosemide as needed for weight gain.  Does have increased edema  Continue furosemide for now until outpatient follow-up with cardiology  Hypokalemia  Hypokalemia and hypomagnesemia will be replaced prior to discharge  Since he is being discharged on furosemide 20 mg daily, a prescription for potassium 10 mill equivalents will also be sent to the pharmacy.    Results from last 7 days   Lab Units 01/21/25  0448 01/20/25  0574  01/19/25  1704 01/19/25  0457 01/18/25  1507 01/17/25 2022   POTASSIUM mmol/L 3.5 2.8* 3.4* 2.7* 3.6 3.6   MAGNESIUM mg/dL 2.0 1.8*  --   --  1.8* 1.8*         Medical Problems       Resolved Problems  Date Reviewed: 1/21/2025   None        Discharging Physician / Practitioner: Ras Alatorre DO  PCP: NACHO Kenney  Admission Date:   Admission Orders (From admission, onward)       Ordered        01/19/25 1139  INPATIENT ADMISSION  Once            01/18/25 2318  Place in Observation  Once                        Discharge Date: 01/21/25    Consultations During Hospital Stay:  IP CONSULT TO ENDOCRINOLOGY     Procedures Performed:   * No surgery found *     Images:   No results found.    Lab Results: I have reviewed the following results:  Results from last 7 days   Lab Units 01/21/25  0448 01/20/25  0513 01/19/25  0457 01/18/25  1507 01/17/25 2022   WBC Thousand/uL 10.90* 9.65 9.96 9.58 9.23   HEMOGLOBIN g/dL 10.8* 11.0* 10.1* 11.3* 11.0*   HEMATOCRIT % 33.0* 33.0* 30.5* 34.3* 34.5*   MCV fL 98 96 95 97 99*   PLATELETS Thousands/uL 209 209 199 216 242     Results from last 7 days   Lab Units 01/21/25  0448 01/20/25  0513 01/19/25  1704 01/19/25  0457 01/18/25  1507 01/17/25 2022   SODIUM mmol/L 140 140  --  140 134* 134*   POTASSIUM mmol/L 3.5 2.8* 3.4* 2.7* 3.6 3.6   CHLORIDE mmol/L 108 108  --  109* 100 99   CO2 mmol/L 28 26  --  26 23 26   BUN mg/dL 8 8  --  11 16 19   CREATININE mg/dL 0.90 0.87  --  0.91 1.10 1.23   CALCIUM mg/dL 7.5* 7.3*  --  7.6* 8.0* 7.8*   ALBUMIN g/dL 2.9*  --   --   --  3.5 3.3*   TOTAL BILIRUBIN mg/dL 0.51  --   --   --  0.66 0.53   ALK PHOS U/L 67  --   --   --  99 110*   ALT U/L 19  --   --   --  20 26   AST U/L 19  --   --   --  13 29   EGFR ml/min/1.73sq m 88 89  --  86 69 60   GLUCOSE RANDOM mg/dL 139 220*  --  135 582* 576*                      Results from last 7 days   Lab Units 01/21/25  1139 01/21/25  0747 01/20/25  2125 01/20/25  1604 01/20/25  1158  "01/20/25  0755 01/19/25  2054 01/19/25  1529 01/19/25  1240 01/19/25  1214   POC GLUCOSE mg/dl 79 99 174* 136 150* 182* 207* 170* 87 60*     Results from last 7 days   Lab Units 01/19/25  0457   HEMOGLOBIN A1C % 8.3*     Results from last 7 days   Lab Units 01/18/25  1507   TSH 3RD GENERATON uIU/mL 0.828     Results from last 7 days   Lab Units 01/17/25  2250 01/17/25 2022   LACTIC ACID mmol/L 2.7* 3.5*           Results from last 7 days   Lab Units 01/18/25  1811   COLOR UA  Yellow   CLARITY UA  Clear   SPEC GRAV UA  1.015   PH UA  6.0   LEUKOCYTES UA  Negative   NITRITE UA  Negative   GLUCOSE UA mg/dl 500 (1/2%)*   KETONES UA mg/dl 15 (1+)*   BLOOD UA  Negative                     Incidental Findings:      Test Results Pending at Discharge (will require follow up):      Reason for Admission:   Hyperglycemia - no symptoms (EMS from [facility] reading \"high\". Was here overnight for same. Denies symptoms. )    Hospital Course:   Karson You is a 69 y.o. male patient who originally presented to the hospital on 1/18/2025 due to hyperglycemia at [facility].  He was recently hospitalized at [facility] and went to [facility] for rehab.  During this hospitalization he was seen by endocrinology.  His insulin regimen has been adjusted as above.  His potassium needs to be replaced.  He did have hypokalemia which was replaced.  There are some concerns about volume retention so he is being discharged with scheduled furosemide until outpatient follow-up with cardiology.    Please see above list of diagnoses and related plan for additional information.     Condition at Discharge: stable     Discharge Day Visit / Exam:   Subjective: Patient seen and examined, feeling well.      Vitals: Blood Pressure: (!) 181/88 (01/21/25 0743)  Pulse: 73 (01/21/25 0743)  Temperature: (!) 97.4 °F (36.3 °C) (01/21/25 0743)  Temp Source: Temporal (01/21/25 0743)  Respirations: 20 (01/21/25 0743)  Height: 5' 4\" (162.6 cm) (01/19/25 0032)  Weight - " Scale: 87.1 kg (192 lb 0.3 oz) (01/19/25 0032)  SpO2: 98 % (01/21/25 0743)    Exam:   Physical Exam  Vitals reviewed.   Constitutional:       General: He is not in acute distress.  HENT:      Head: Atraumatic.   Cardiovascular:      Rate and Rhythm: Regular rhythm.   Pulmonary:      Effort: Pulmonary effort is normal.      Breath sounds: Decreased breath sounds present. No wheezing.   Abdominal:      General: Bowel sounds are normal.      Palpations: Abdomen is soft.      Tenderness: There is no abdominal tenderness.   Musculoskeletal:         General: No swelling.   Skin:     General: Skin is warm and dry.   Neurological:      General: No focal deficit present.      Mental Status: He is alert.   Psychiatric:         Mood and Affect: Mood normal.       Discussion with Family: Daughter on telephone    Discharge instructions/Information to patient and family:   See after visit summary for information provided to patient and family.      Provisions for Follow-Up Care:  See after visit summary for information related to follow-up care and any pertinent home health orders.      Mobility at time of Discharge:  Basic Mobility Inpatient Raw Score: 20  JH-HLM Goal: 6: Walk 10 steps or more  JH-HLM Achieved: 7: Walk 25 feet or more  JH-HLM Goal achieved. Continue to encourage appropriate mobility.    Disposition:   Home with VNA Services (Reminder: Complete face to face encounter)    Planned Readmission: No     Discharge Medications:  See after visit summary for reconciled discharge medications provided to patient and family.      Administrative Statements   I spent 35 minutes discharging the patient. This time was spent on the day of discharge. I had direct contact with the patient on the day of discharge. Greater than 50% of the total time was spent examining patient, answering all patient questions, arranging and discussing plan of care with patient as well as directly providing post-discharge instructions.  Additional  time then spent on discharge activities.    **Please Note: This note may have been constructed using a voice recognition system**

## 2025-01-21 NOTE — ASSESSMENT & PLAN NOTE
Wt Readings from Last 3 Encounters:   01/19/25 87.1 kg (192 lb 0.3 oz)   01/17/25 89.5 kg (197 lb 5 oz)   09/05/24 77.8 kg (171 lb 9.6 oz)     On furosemide as needed for weight gain.  Does have increased edema  Continue furosemide for now until outpatient follow-up with cardiology

## 2025-01-21 NOTE — PHYSICAL THERAPY NOTE
PHYSICAL THERAPY EVALUATION          Patient Name: Karson You  Today's Date: 1/21/2025 01/21/25 1043   PT Last Visit   PT Visit Date 01/21/25   Note Type   Note type Evaluation   Pain Assessment   Pain Assessment Tool 0-10   Pain Score No Pain   Restrictions/Precautions   Other Precautions Cognitive;Chair Alarm;Bed Alarm   Home Living   Type of Home House   Home Layout Two level;1/2 bath on main level;Bed/bath upstairs;Able to live on main level with bedroom/bathroom;Stairs to enter with rails   Home Equipment Walker   Additional Comments per aide, 3-4 RYAN. first fl set up w bed and 1/2 bath. goes upstairs x1/wk to shower   Prior Function   Level of [place] Independent with functional mobility;Needs assistance with ADLs;Needs assistance with IADLS   Lives With Family  (sister, dtr and girlfriend)   Receives Help From Family;Personal care attendant  (aides 7days/wk from 9-11am as well as in the evenings to help w dinner and getting ready for bed. family cares for patient while aides arent there)   Falls in the last 6 months 1 to 4   Comments per aide, pt gets A for ADLs and IADLs. dtr helps him up the steps to shower. he gets up and walks w RW on his own however aides/family provide A if needed.   General   Additional Pertinent History pt admitted 1/18/25 for T1DM w hyperglycemia. activity as tolerated orders. PMHx significant for obesity, afib, CHF, DM, psychiatric disorder, CVA, PAD   Family/Caregiver Present Yes  (morning caregiver)   Cognition   Overall Cognitive Status Impaired   Arousal/Participation Cooperative   Attention Within functional limits   Orientation Level Oriented to person;Oriented to place;Oriented to time   Following Commands Follows all commands and directions without difficulty   Bed Mobility   Supine to Sit 5  Supervision   Additional items Bedrails;Increased time required   Transfers   Sit to Stand 5  Supervision   Additional items Other  (RW)   Stand to  Sit 5  Supervision   Additional items Increased time required;Other  (RW)   Ambulation/Elevation   Gait pattern Short stride   Gait Assistance 5  Supervision   Additional items Assist x 1   Assistive Device Rolling walker   Distance >200'   Balance   Static Standing Fair +   Dynamic Standing Fair   Ambulatory Fair   Endurance Deficit   Endurance Deficit No   Activity Tolerance   Activity Tolerance Patient tolerated treatment well   Assessment   Prognosis Good   Assessment Karson You is a 69 y.o. male admitted to [facility] on 1/18/2025 for Type 1 diabetes mellitus with hyperglycemia (HCC). PT was consulted and pt was seen on 1/21/2025 for mobility assessment and d/c planning. Pt presents w fall risk. Per caregiver at bedside, pt gets A for ADLs and IADLs, ambulates w RW. Has first fl set up however does go upstairs x1/wk for showers w assistance of dtr. Pt is currently functioning at a S for bed mobility, transfers and ambulation. Pt demonstrated ability to ambulate community distances. No acute deficits impacting function. No further therapy needs identified at this time as pt functioning at baseline. Pt will benefit from continued  mobilization via nsg/restorative. The patient's AM-PAC Basic Mobility Inpatient Short Form Raw Score is 20. A Raw score of greater than 16 suggests the patient may benefit from discharge to home.   Barriers to Discharge None   Plan   PT Frequency   (d/c PT: maintain on restorative)   Discharge Recommendation   Rehab Resource Intensity Level, PT No post-acute rehabilitation needs   AM-PAC Basic Mobility Inpatient   Turning in Flat Bed Without Bedrails 4   Lying on Back to Sitting on Edge of Flat Bed Without Bedrails 3   Moving Bed to Chair 3   Standing Up From Chair Using Arms 4   Walk in Room 3   Climb 3-5 Stairs With Railing 3   Basic Mobility Inpatient Raw Score 20   Basic Mobility Standardized Score 43.99   [facility] Level Of Mobility   [facility] Goal 6: Walk 10 steps or more    SEJAL-TARA Achieved 7: Walk 25 feet or more   End of Consult   Patient Position at End of Consult Bedside chair;Bed/Chair alarm activated;All needs within reach     Linda Velasquez, PT     Skyri[place] Counseling: I discussed with the patient the risks of risankizumab-rzaa including but not limited to immunosuppression, and serious infections.  The patient understands that monitoring is required including a PPD at baseline and must alert us or the primary physician if symptoms of infection or other concerning signs are noted.

## 2025-01-21 NOTE — ASSESSMENT & PLAN NOTE
History of brittle diabetes type 1, adrenal insufficiency, orthostatic hypotension, paroxysmal atrial fibrillation who presents to the hospital from SNF for hyperglycemia  Patient does have hyper and hypoglycemia.  Seen by endocrinology today  Currently on: Glargine 18 units with lispro 6 units 3 times daily    Results from last 7 days   Lab Units 01/20/25  1604 01/20/25  1158 01/20/25  0755 01/19/25  2054 01/19/25  1529 01/19/25  1240   POC GLUCOSE mg/dl 136 150* 182* 207* 170* 87

## 2025-01-21 NOTE — ASSESSMENT & PLAN NOTE
History of brittle diabetes type 1, adrenal insufficiency, orthostatic hypotension, paroxysmal atrial fibrillation who presents to the hospital from SNF for hyperglycemia  Patient does have hyper and hypoglycemia.  Seen by endocrinology  Currently on: Glargine 18 units with lispro 6 units 3 times daily.  Follow-up with endocrine postdischarge.    Results from last 7 days   Lab Units 01/21/25  1139 01/21/25  0747 01/20/25  2125 01/20/25  1604 01/20/25  1158 01/20/25  0755   POC GLUCOSE mg/dl 79 99 174* 136 150* 182*

## 2025-01-21 NOTE — CASE MANAGEMENT
Case Management Discharge Planning Note    Patient name Karson You  Location [facility] 4 /E4 -* MRN [id number]  : 1955 Date 2025       Current Admission Date: 2025  Current Admission Diagnosis:Type 1 diabetes mellitus with hyperglycemia (HCC)   Patient Active Problem List    Diagnosis Date Noted Date Diagnosed    Leukocytosis 2025     Type 1 diabetes mellitus with hyperglycemia (HCC) 2024     PAD (peripheral artery disease) (HCC) 2024     Stage 2 chronic kidney disease 2024     Mixed hyperlipidemia 2024     Morbid (severe) obesity due to excess calories ([facility]) 2024     Vitamin D deficiency 2024     Chronic diastolic heart failure ([facility]) 2023     Prolonged Q-T interval on ECG 2023     Hypokalemia 2023     Hypomagnesemia 2023     Fall 11/10/2023     Adrenal insufficiency ([facility]) 11/10/2023     Orthostatic hypotension 11/10/2023     History of CVA (cerebrovascular accident) 11/10/2023     Spells of decreased attentiveness 2023     Iron deficiency anemia, unspecified 2023     Syncope 2023     Unintentional weight loss 2022     Paroxysmal atrial fibrillation  10/19/2022     Anemia of chronic disease 10/19/2022     Psychiatric disorder 10/19/2022       LOS (days): 2  Geometric Mean LOS (GMLOS) (days): 3  Days to GMLOS:1     OBJECTIVE:  Risk of Unplanned Readmission Score: 27.65         Current admission status: Inpatient   Preferred Pharmacy:   [facility] - [place] - [address]  [address]  [place] [address]  Phone: [phone] Fax: [phone]    [id number] [place] - [facility]  [place] [address]  Phone: [phone] Fax: [phone]    [facility] - [address]   [address]  [place] [address]  Phone: [phone] Fax: [phone]    [id number] - [place] -  [address] [place] [address]  [place] [address]  Phone: [phone] Fax: [phone]    Primary Care Provider: NACHO Kenney    Primary Insurance: [facility]  Secondary Insurance:     DISCHARGE DETAILS:    Discharge planning discussed with:: Beth Gilbert  Freedom of Choice: Yes  Comments - Freedom of Choice: Home W/ [facility] STRANGE contacted family/caregiver?: Yes  Were Treatment Team discharge recommendations reviewed with patient/caregiver?: Yes  Did patient/caregiver verbalize understanding of patient care needs?: N/A- going to facility  Were patient/caregiver advised of the risks associated with not following Treatment Team discharge recommendations?: Yes    Contacts  Patient Contacts: Ofelia Raymond  Relationship to Patient:: Family  Contact Method: Phone  Phone Number: Ofelia Raymond (Daughter)   [phone]  Reason/Outcome: Continuity of Care, Emergency Contact, Referral, Discharge Planning    Requested Home Health Care         Is the patient interested in HHC at discharge?: Yes  Home Health Discipline requested:: Occupational Therapy, Nursing, Physical Therapy  Home Health Agency Name:: [facility]  A External Referral Reason (only applicable if external HHA name selected): Patient has established relationship with provider  Home Health Follow-Up Provider:: PCP  Home Health Services Needed:: Gait/ADL Training, Evaluate Functional Status and Safety, Strengthening/Theraputic Exercises to Improve Function, Heart Failure Management  Homebound Criteria Met:: Requires the Assistance of Another Person for Safe Ambulation or to Leave the Home, Uses an Assist Device (i.e. cane, walker, etc)  Supporting Clincal Findings:: Limited Endurance, Fatigues Easliy in Short Distances    DME Referral Provided  Referral made for DME?: No    Other Referral/Resources/Interventions Provided:  Interventions: HHC  Referral Comments: [facility] reserved in Aidin    Would you like to  participate in our [facility] service program?  : No - Declined    Treatment Team Recommendation: Home with Home Health Care  Discharge Destination Plan:: Home with Home Health Care  Transport at Discharge : Family                 CM spoke with patient's daughter via PC to review therapy recommendations of home with [facility].  Daughter in agreement with this plan, she will ensure caregivers will resume care and family will transport patient home later today.  Daughter would prefer [facility], referral sent they are unable to accept patient.  Cm reviewed choice list, [facility] only available agency, family agreeable to [facility].  [facility] reserved in Aidin.

## 2025-01-21 NOTE — ASSESSMENT & PLAN NOTE
Orthostatic hypotension likely related to autonomic dysfunction from longstanding history of diabetes mellitus  Continue compression stockings and abdminal binder  Continue midodrine

## 2025-01-21 NOTE — ASSESSMENT & PLAN NOTE
Wt Readings from Last 3 Encounters:   01/19/25 87.1 kg (192 lb 0.3 oz)   01/17/25 89.5 kg (197 lb 5 oz)   09/05/24 77.8 kg (171 lb 9.6 oz)     On furosemide as needed for weight gain.  Does have increased edema  If potassium stable will restart furosemide tomorrow

## 2025-01-21 NOTE — PROGRESS NOTES
Patient:  DAVID KELLY    MRN:  [id number]    Aidin Request ID:  [id number]    Level of care reserved:  Home Health Agency    Partner Reserved:  [facility] [place] [address] [phone]    Clinical needs requested:    Geography searched:  [phone]    Start of Service:    Request sent:  12:16pm EST on 1/21/2025 by Liana Brothers    Partner reserved:  12:36pm EST on 1/21/2025 by Liana Brothers    Choice list shared:  12:35pm EST on 1/21/2025 by Liana Brothers

## 2025-01-21 NOTE — PLAN OF CARE
Problem: Potential for Falls  Goal: Patient will remain free of falls  Description: INTERVENTIONS:  - Educate patient/family on patient safety including physical limitations  - Instruct patient to call for assistance with activity   - Consult OT/PT to assist with strengthening/mobility   - Keep Call bell within reach  - Keep bed low and locked with side rails adjusted as appropriate  - Keep care items and personal belongings within reach  - Initiate and maintain comfort rounds  - Make Fall Risk Sign visible to staff  - Offer Toileting every 2 Hours, in advance of need  - Initiate/Maintain bed alarm  - Obtain necessary fall risk management equipment:   - Apply yellow socks and bracelet for high fall risk patients  - Consider moving patient to room near nurses station  Outcome: Progressing     Problem: Prexisting or High Potential for Compromised Skin Integrity  Goal: Skin integrity is maintained or improved  Description: INTERVENTIONS:  - Identify patients at risk for skin breakdown  - Assess and monitor skin integrity  - Assess and monitor nutrition and hydration status  - Monitor labs   - Assess for incontinence   - Turn and reposition patient  - Assist with mobility/ambulation  - Relieve pressure over bony prominences  - Avoid friction and shearing  - Provide appropriate hygiene as needed including keeping skin clean and dry  - Evaluate need for skin moisturizer/barrier cream  - Collaborate with interdisciplinary team   - Patient/family teaching  - Consider wound care consult   Outcome: Progressing     Problem: PAIN - ADULT  Goal: Verbalizes/displays adequate comfort level or baseline comfort level  Description: Interventions:  - Encourage patient to monitor pain and request assistance  - Assess pain using appropriate pain scale  - Administer analgesics based on type and severity of pain and evaluate response  - Implement non-pharmacological measures as appropriate and evaluate response  - Consider cultural and  social influences on pain and pain management  - Notify physician/advanced practitioner if interventions unsuccessful or patient reports new pain  Outcome: Progressing     Problem: INFECTION - ADULT  Goal: Absence or prevention of progression during hospitalization  Description: INTERVENTIONS:  - Assess and monitor for signs and symptoms of infection  - Monitor lab/diagnostic results  - Monitor all insertion sites, i.e. indwelling lines, tubes, and drains  - Monitor endotracheal if appropriate and nasal secretions for changes in amount and color  - [place] appropriate cooling/warming therapies per order  - Administer medications as ordered  - Instruct and encourage patient and family to use good hand hygiene technique  - Identify and instruct in appropriate isolation precautions for identified infection/condition  Outcome: Progressing     Problem: SAFETY ADULT  Goal: Patient will remain free of falls  Description: INTERVENTIONS:  - Educate patient/family on patient safety including physical limitations  - Instruct patient to call for assistance with activity   - Consult OT/PT to assist with strengthening/mobility   - Keep Call bell within reach  - Keep bed low and locked with side rails adjusted as appropriate  - Keep care items and personal belongings within reach  - Initiate and maintain comfort rounds  - Make Fall Risk Sign visible to staff  - Offer Toileting every 2 Hours, in advance of need  - Initiate/Maintain bed alarm  - Obtain necessary fall risk management equipment:   - Apply yellow socks and bracelet for high fall risk patients  - Consider moving patient to room near nurses station  Outcome: Progressing  Goal: Maintain or return to baseline ADL function  Description: INTERVENTIONS:  -  Assess patient's ability to carry out ADLs; assess patient's baseline for ADL function and identify physical deficits which impact ability to perform ADLs (bathing, care of mouth/teeth, toileting, grooming, dressing,  etc.)  - Assess/evaluate cause of self-care deficits   - Assess range of motion  - Assess patient's mobility; develop plan if impaired  - Assess patient's need for assistive devices and provide as appropriate  - Encourage maximum independence but intervene and supervise when necessary  - Involve family in performance of ADLs  - Assess for home care needs following discharge   - Consider OT consult to assist with ADL evaluation and planning for discharge  - Provide patient education as appropriate  Outcome: Progressing  Goal: Maintains/Returns to pre admission functional level  Description: INTERVENTIONS:  - Perform AM-PAC 6 Click Basic Mobility/ Daily Activity assessment daily.  - Set and communicate daily mobility goal to care team and patient/family/caregiver.   - Collaborate with rehabilitation services on mobility goals if consulted  - Perform Range of Motion 3 times a day.  - Reposition patient every 2 hours.  - Dangle patient 3 times a day  - Stand patient 3 times a day  - Ambulate patient 3 times a day  - Out of bed to chair 3 times a day   - Out of bed for meals 3 times a day  - Out of bed for toileting  - Record patient progress and toleration of activity level   Outcome: Progressing     Problem: DISCHARGE PLANNING  Goal: Discharge to home or other facility with appropriate resources  Description: INTERVENTIONS:  - Identify barriers to discharge w/patient and caregiver  - Arrange for needed discharge resources and transportation as appropriate  - Identify discharge learning needs (meds, wound care, etc.)  - Arrange for interpretive services to assist at discharge as needed  - Refer to Case Management Department for coordinating discharge planning if the patient needs post-hospital services based on physician/advanced practitioner order or complex needs related to functional status, cognitive ability, or social support system  Outcome: Progressing     Problem: Knowledge Deficit  Goal:  Patient/family/caregiver demonstrates understanding of disease process, treatment plan, medications, and discharge instructions  Description: Complete learning assessment and assess knowledge base.  Interventions:  - Provide teaching at level of understanding  - Provide teaching via preferred learning methods  Outcome: Progressing

## 2025-01-21 NOTE — ASSESSMENT & PLAN NOTE
Orthostatic hypotension likely related to autonomic dysfunction from longstanding history of diabetes mellitus  Continue compression stockings and abdminal binder  Continue midodrine with hold parameters SBP greater than 130.  The patient does have significant supine hypertension.

## 2025-01-21 NOTE — PLAN OF CARE
Problem: Potential for Falls  Goal: Patient will remain free of falls  Description: INTERVENTIONS:  - Educate patient/family on patient safety including physical limitations  - Instruct patient to call for assistance with activity   - Consult OT/PT to assist with strengthening/mobility   - Keep Call bell within reach  - Keep bed low and locked with side rails adjusted as appropriate  - Keep care items and personal belongings within reach  - Initiate and maintain comfort rounds  - Make Fall Risk Sign visible to staff  - Offer Toileting every 2 Hours, in advance of need  - Initiate/Maintain bed alarm  - Obtain necessary fall risk management equipment:   - Apply yellow socks and bracelet for high fall risk patients  - Consider moving patient to room near nurses station  1/20/2025 2232 by Sharon Retana RN  Outcome: Progressing  1/20/2025 2232 by Sharon Retana RN  Outcome: Progressing     Problem: Prexisting or High Potential for Compromised Skin Integrity  Goal: Skin integrity is maintained or improved  Description: INTERVENTIONS:  - Identify patients at risk for skin breakdown  - Assess and monitor skin integrity  - Assess and monitor nutrition and hydration status  - Monitor labs   - Assess for incontinence   - Turn and reposition patient  - Assist with mobility/ambulation  - Relieve pressure over bony prominences  - Avoid friction and shearing  - Provide appropriate hygiene as needed including keeping skin clean and dry  - Evaluate need for skin moisturizer/barrier cream  - Collaborate with interdisciplinary team   - Patient/family teaching  - Consider wound care consult   1/20/2025 2232 by Sharon Retana RN  Outcome: Progressing  1/20/2025 2232 by Sharon Retana RN  Outcome: Progressing     Problem: PAIN - ADULT  Goal: Verbalizes/displays adequate comfort level or baseline comfort level  Description: Interventions:  - Encourage patient to monitor pain and request assistance  - Assess pain using appropriate pain  scale  - Administer analgesics based on type and severity of pain and evaluate response  - Implement non-pharmacological measures as appropriate and evaluate response  - Consider cultural and social influences on pain and pain management  - Notify physician/advanced practitioner if interventions unsuccessful or patient reports new pain  1/20/2025 2232 by Sharon Retana RN  Outcome: Progressing  1/20/2025 2232 by Sharon Retana RN  Outcome: Progressing     Problem: INFECTION - ADULT  Goal: Absence or prevention of progression during hospitalization  Description: INTERVENTIONS:  - Assess and monitor for signs and symptoms of infection  - Monitor lab/diagnostic results  - Monitor all insertion sites, i.e. indwelling lines, tubes, and drains  - Monitor endotracheal if appropriate and nasal secretions for changes in amount and color  - [place] appropriate cooling/warming therapies per order  - Administer medications as ordered  - Instruct and encourage patient and family to use good hand hygiene technique  - Identify and instruct in appropriate isolation precautions for identified infection/condition  1/20/2025 2232 by Sharon Retana RN  Outcome: Progressing  1/20/2025 2232 by Sharon Retana RN  Outcome: Progressing     Problem: SAFETY ADULT  Goal: Patient will remain free of falls  Description: INTERVENTIONS:  - Educate patient/family on patient safety including physical limitations  - Instruct patient to call for assistance with activity   - Consult OT/PT to assist with strengthening/mobility   - Keep Call bell within reach  - Keep bed low and locked with side rails adjusted as appropriate  - Keep care items and personal belongings within reach  - Initiate and maintain comfort rounds  - Make Fall Risk Sign visible to staff  - Offer Toileting every 2 Hours, in advance of need  - Initiate/Maintain bed alarm  - Obtain necessary fall risk management equipment:   - Apply yellow socks and bracelet for high fall risk  patients  - Consider moving patient to room near nurses station  1/20/2025 2232 by Sharon Retana RN  Outcome: Progressing  1/20/2025 2232 by Sharon Retana RN  Outcome: Progressing  Goal: Maintain or return to baseline ADL function  Description: INTERVENTIONS:  -  Assess patient's ability to carry out ADLs; assess patient's baseline for ADL function and identify physical deficits which impact ability to perform ADLs (bathing, care of mouth/teeth, toileting, grooming, dressing, etc.)  - Assess/evaluate cause of self-care deficits   - Assess range of motion  - Assess patient's mobility; develop plan if impaired  - Assess patient's need for assistive devices and provide as appropriate  - Encourage maximum independence but intervene and supervise when necessary  - Involve family in performance of ADLs  - Assess for home care needs following discharge   - Consider OT consult to assist with ADL evaluation and planning for discharge  - Provide patient education as appropriate  1/20/2025 2232 by Sharon Retana RN  Outcome: Progressing  1/20/2025 2232 by Sharon Retana RN  Outcome: Progressing  Goal: Maintains/Returns to pre admission functional level  Description: INTERVENTIONS:  - Perform AM-PAC 6 Click Basic Mobility/ Daily Activity assessment daily.  - Set and communicate daily mobility goal to care team and patient/family/caregiver.   - Collaborate with rehabilitation services on mobility goals if consulted  - Perform Range of Motion 3 times a day.  - Reposition patient every 2 hours.  - Dangle patient 3 times a day  - Stand patient 3 times a day  - Ambulate patient 3 times a day  - Out of bed to chair 3 times a day   - Out of bed for meals 3 times a day  - Out of bed for toileting  - Record patient progress and toleration of activity level   1/20/2025 2232 by Sharon Retana RN  Outcome: Progressing  1/20/2025 2232 by Sharon Retana RN  Outcome: Progressing     Problem: DISCHARGE PLANNING  Goal: Discharge to home or  other facility with appropriate resources  Description: INTERVENTIONS:  - Identify barriers to discharge w/patient and caregiver  - Arrange for needed discharge resources and transportation as appropriate  - Identify discharge learning needs (meds, wound care, etc.)  - Arrange for interpretive services to assist at discharge as needed  - Refer to Case Management Department for coordinating discharge planning if the patient needs post-hospital services based on physician/advanced practitioner order or complex needs related to functional status, cognitive ability, or social support system  1/20/2025 2232 by Sharon Retana RN  Outcome: Progressing  1/20/2025 2232 by Sharon Retana RN  Outcome: Progressing     Problem: Knowledge Deficit  Goal: Patient/family/caregiver demonstrates understanding of disease process, treatment plan, medications, and discharge instructions  Description: Complete learning assessment and assess knowledge base.  Interventions:  - Provide teaching at level of understanding  - Provide teaching via preferred learning methods  1/20/2025 2232 by Sharon Retana RN  Outcome: Progressing  1/20/2025 2232 by Sharon Retana RN  Outcome: Progressing

## 2025-01-22 ENCOUNTER — TRANSITIONAL CARE MANAGEMENT (OUTPATIENT)
Dept: FAMILY MEDICINE CLINIC | Facility: CLINIC | Age: 70
End: 2025-01-22

## 2025-01-22 NOTE — UTILIZATION REVIEW
NOTIFICATION OF ADMISSION DISCHARGE   This is a Notification of Discharge from [facility]. Please be advised that this patient has been discharge from our facility. Below you will find the admission and discharge date and time including the patient’s disposition.   UTILIZATION REVIEW CONTACT:  Yoly Marie  Utilization   Network Utilization Review Department  Phone: [phone] x carefully listen to the prompts. All voicemails are confidential.  Email: [email or web address]     ADMISSION INFORMATION  PRESENTATION DATE: 1/18/2025  2:10 PM  OBERVATION ADMISSION DATE: 01/18/2025 2318  INPATIENT ADMISSION DATE: 1/19/25 11:39 AM   DISCHARGE DATE: 1/21/2025  5:34 PM   DISPOSITION:Home with Home Health Care    Network Utilization Review Department  ATTENTION: Please call with any questions or concerns to [phone] and carefully listen to the prompts so that you are directed to the right person. All voicemails are confidential.   For Discharge needs, contact Care Management DC Support Team at [phone] opt. 2  Send all requests for admission clinical reviews, approved or denied determinations and any other requests to dedicated fax number below belonging to the campus where the patient is receiving treatment. List of dedicated fax numbers for the Facilities:  FACILITY NAME UR FAX NUMBER   ADMISSION DENIALS (Administrative/Medical Necessity) [phone]   DISCHARGE SUPPORT TEAM ([facility]) [phone]   PARENT CHILD HEALTH (Maternity/NICU/Pediatrics) [phone]   [facility] [phone]   [facility] [phone]   [facility] [phone]   [facility] [phone]   [facility] [phone]   [facility] [phone]   [facility] [phone]   [facility]  [facility] [phone]   [facility] [phone]   [facility] [phone]   [facility] [phone]   [facility] [phone]

## 2025-01-23 ENCOUNTER — TRANSITIONAL CARE MANAGEMENT (OUTPATIENT)
Dept: FAMILY MEDICINE CLINIC | Facility: CLINIC | Age: 70
End: 2025-01-23

## 2025-02-04 ENCOUNTER — OFFICE VISIT (OUTPATIENT)
Dept: PODIATRY | Facility: CLINIC | Age: 70
End: 2025-02-04
Payer: COMMERCIAL

## 2025-02-04 VITALS — HEIGHT: 64 IN | WEIGHT: 180 LBS | BODY MASS INDEX: 30.73 KG/M2

## 2025-02-04 DIAGNOSIS — B35.1 ONYCHOMYCOSIS: ICD-10-CM

## 2025-02-04 DIAGNOSIS — I77.9 PAOD (PERIPHERAL ARTERIAL OCCLUSIVE DISEASE) (HCC): Primary | ICD-10-CM

## 2025-02-04 DIAGNOSIS — E10.43 TYPE 1 DIABETES MELLITUS WITH DIABETIC AUTONOMIC (POLY)NEUROPATHY (HCC): ICD-10-CM

## 2025-02-04 PROCEDURE — 11720 DEBRIDE NAIL 1-5: CPT | Performed by: PODIATRIST

## 2025-02-04 PROCEDURE — 99203 OFFICE O/P NEW LOW 30 MIN: CPT | Performed by: PODIATRIST

## 2025-02-04 NOTE — PROGRESS NOTES
Name: Karson You      : 1955      MRN: [id number]  Encounter Provider: Steven Molina DPM  Encounter Date: 2025   Encounter department: [facility] [place]    Assessment & Plan     1. PAOD (peripheral arterial occlusive disease) (Formerly [facility])  -     VAS ARTERIAL DUPLEX- LOWER LIMB BILATERAL; Future; Expected date: 2025  2. Type 1 diabetes mellitus with diabetic autonomic (poly)neuropathy (Formerly [facility])  -     Ambulatory Referral to Podiatry  3. Onychomycosis [B35.1]    History of great toe partial amputation, 2nd toe and 4th toe amputation.  Right 3 and 5 elongated dystrophic with subungual debris.    Left foot 1,2 elongated dystrophic with subungual debris as well as 345 elongated dystrophic.    Patient would benefit from routine foot care.      Return in about 3 months (around 2025) for Dr. Lopez routine foot care 3 months. .    Subjective     Patient presents for routine footcare has history of amputations  Unclear and history behind this.  Denies any new acute changes.  Denies any numbness burning and tingling to the feet.        Constitutional:  Negative for chills and fever.   Respiratory:  Negative for chest tightness and shortness of breath.    Gastrointestinal:  Negative for nausea and vomiting.     Current Outpatient Medications on File Prior to Visit   Medication Sig   • albuterol (2.5 mg/3 mL) 0.083 % nebulizer solution Take 3 mL (2.5 mg total) by nebulization every 6 (six) hours as needed for wheezing or shortness of breath   • albuterol (PROVENTIL HFA,VENTOLIN HFA) 90 mcg/act inhaler Inhale 2 puffs every 4 (four) hours as needed for wheezing   • apixaban (Eliquis) 5 mg Take 1 tablet (5 mg total) by mouth 2 (two) times a day   • aspirin 81 mg chewable tablet Chew 81 mg daily   • atorvastatin (LIPITOR) 10 mg tablet Take 1 tablet by mouth daily   • Continuous Blood Gluc  (Dexcom G7 ) CYN Use 1 each continuous   • Continuous Glucose Sensor (Dexcom G7  "Sensor) Use 1 Device every 10 days   • cyanocobalamin (VITAMIN B-12) 100 MCG tablet Take 1 tablet (100 mcg total) by mouth daily Do not start before November 15, 2023.   • ergocalciferol (VITAMIN D2) 50,000 units Take 1 capsule (50,000 Units total) by mouth once a week   • ferrous sulfate 324 (65 Fe) mg Take 1 tablet (324 mg total) by mouth daily before breakfast   • fludrocortisone (FLORINEF) 0.1 mg tablet TAKE 2 TABLETS BY MOUTH DAILY.   • furosemide (LASIX) 20 mg tablet Take 1 tablet (20 mg total) by mouth daily   • hydrocortisone (CORTEF) 5 mg tablet USE 4 TABLETS IN MORNING AND 3 TABLETS DAILY AFTERNOON   • insulin glargine (Toujeo Max SoloStar) 300 units/mL CONCENTRATED U-300 injection pen (2-unit dial) Inject 18 Units under the skin daily at bedtime   • insulin lispro (HumALOG/ADMELOG) 100 units/mL injection Inject 6 Units under the skin 3 (three) times a day with meals   • Insulin Pen Needle (BD Pen Needle Josie 2nd Gen) 32G X 4 MM MISC FOR USE WITH INSULIN PEN. PHARMACY MAY DISPENSE BRAND COVERED BY INSURANCE.   • Insulin Pen Needle (BD Pen Needle Josie 2nd Gen) 32G X 4 MM MISC For use with insulin pen 4 times daily. Pharmacy may dispense brand covered by insurance.   • Magnesium Oxide 400 MG CAPS Take 1 tablet (400 mg total) by mouth 2 (two) times a day   • midodrine (PROAMATINE) 5 mg tablet Take 1 tablet (5 mg total) by mouth 3 (three) times a day before meals   • potassium chloride (Klor-Con) 10 mEq tablet Take 1 tablet (10 mEq total) by mouth in the morning   • sertraline (ZOLOFT) 100 mg tablet Take 100 mg by mouth daily       Objective     Ht 5' 4\" (1.626 m)   Wt 81.6 kg (180 lb)   BMI 30.90 kg/m²     Diabetic Foot Exam    Patient's shoes and socks removed.    Right Foot/Ankle   Right Foot Inspection  Skin Exam: skin intact. No dry skin, no warmth, no erythema, no maceration and no pre-ulcer. Amputation: amputation right foot (Comments: 1,2,4)    Toe Exam: ROM and strength within normal limits. "     Sensory   Monofilament testing: intact    Vascular  The right DP pulse is 0. The right PT pulse is 0.     Left Foot/Ankle  Left Foot Inspection  Skin Exam: skin normal and skin intact. No dry skin, no warmth, no erythema, no maceration, normal color, no pre-ulcer, no ulcer and no callus.     Toe Exam: ROM and strength within normal limits.     Sensory   Monofilament testing: intact    Vascular  The left DP pulse is 1+. The left PT pulse is 1+.     Assign Risk Category  No deformity present  No loss of protective sensation  Weak pulses  Risk: 0    Nails are elongated dystrophic subungual debris noted as above.    There are no open lesions or ulcerations noted currently.

## 2025-02-07 ENCOUNTER — OFFICE VISIT (OUTPATIENT)
Dept: CARDIOLOGY CLINIC | Facility: CLINIC | Age: 70
End: 2025-02-07
Payer: COMMERCIAL

## 2025-02-07 VITALS
BODY MASS INDEX: 31.4 KG/M2 | DIASTOLIC BLOOD PRESSURE: 68 MMHG | HEIGHT: 64 IN | SYSTOLIC BLOOD PRESSURE: 136 MMHG | WEIGHT: 183.9 LBS | HEART RATE: 74 BPM

## 2025-02-07 DIAGNOSIS — N18.5 CHRONIC KIDNEY DISEASE, STAGE 5 (HCC): ICD-10-CM

## 2025-02-07 DIAGNOSIS — I95.1 ORTHOSTATIC HYPOTENSION: Primary | ICD-10-CM

## 2025-02-07 DIAGNOSIS — I21.A1 TYPE 2 MI (MYOCARDIAL INFARCTION) (HCC): ICD-10-CM

## 2025-02-07 DIAGNOSIS — I50.33 ACUTE ON CHRONIC HEART FAILURE WITH PRESERVED EJECTION FRACTION (HFPEF) (HCC): ICD-10-CM

## 2025-02-07 DIAGNOSIS — E27.40 ADRENAL INSUFFICIENCY (HCC): ICD-10-CM

## 2025-02-07 DIAGNOSIS — E78.2 MIXED HYPERLIPIDEMIA: ICD-10-CM

## 2025-02-07 DIAGNOSIS — I50.32 CHRONIC DIASTOLIC HEART FAILURE (HCC): ICD-10-CM

## 2025-02-07 DIAGNOSIS — I48.0 PAROXYSMAL ATRIAL FIBRILLATION (HCC): ICD-10-CM

## 2025-02-07 DIAGNOSIS — E66.01 MORBID (SEVERE) OBESITY DUE TO EXCESS CALORIES (HCC): ICD-10-CM

## 2025-02-07 PROCEDURE — 99214 OFFICE O/P EST MOD 30 MIN: CPT

## 2025-02-07 PROCEDURE — 93000 ELECTROCARDIOGRAM COMPLETE: CPT

## 2025-02-07 NOTE — PROGRESS NOTES
[facility]  General Cardiology Note  Karson You  1955  [id number]      Referring Provider - No ref. provider found  Chief Complaint   Patient presents with    Follow-up     6 mo f/u - Dr. Reza pt      Edema     Minimal swelling in the arms and face        Assessment & Plan  Orthostatic hypotension  BP is controlled  Maintained on midodrine 5 mg TID,PRN.  Florinef 0.2 mg daily,   Uses abdominal binder and compression stockings.   Paroxysmal atrial fibrillation   [id number] 5: Maintained on Eliquis 5 mg BID  EKG today shows SR  Qtc 495 ms  Chronic diastolic heart failure (HCC)  Wt Readings from Last 3 Encounters:   02/07/25 83.4 kg (183 lb 14.4 oz)   02/04/25 81.6 kg (180 lb)   01/19/25 87.1 kg (192 lb 0.3 oz)   Weight 169-171 baseline   - weight has been stable since discharge  -beta-blocker:  none  --Diuretic: Lasix 20 mg daily + 10 meq potassium  --2 g sodium diet, 1800 cc fluid restriction. Daily weights.   Mixed hyperlipidemia  On atorvastatin 10 mg/d  Adrenal insufficiency (HCC)  Maintained on hydrocortisone 20 mg a.m. and 15 mg p.m. along with Florinef 0.2 mg daily   Secondary adrenal insufficiency following with endocrinology  Chronic kidney disease, stage 5 ([facility])  Lab Results   Component Value Date    EGFR 88 01/21/2025    EGFR 89 01/20/2025    EGFR 86 01/19/2025    CREATININE 0.90 01/21/2025    CREATININE 0.87 01/20/2025    CREATININE 0.91 01/19/2025   stable  Acute on chronic heart failure with preserved ejection fraction (HFpEF) ([facility])  Wt Readings from Last 3 Encounters:   02/07/25 83.4 kg (183 lb 14.4 oz)   02/04/25 81.6 kg (180 lb)   01/19/25 87.1 kg (192 lb 0.3 oz)   See above  Morbid (severe) obesity due to excess calories ([facility])  BMI 31.57  Continue heart healthy diet and regular physical activity  Type 2 MI (myocardial infarction) ([facility])      History CVA, maintained on aspirin and statin  Cardiac testing  SPECT 8/20/2022 [facility] No evidence of ischemia. Likely mild  diaphragmatic attenuation artifact at proximal inferior wall.  Normal left ventricular wall motion and ejection fraction.   TTE 12/5/2023.  EF 60%.  Wall motion normal.  Diastolic function normal.  RV normal size and function.    Procedure/testing if ordered:  Risks Vs benefits discussed  Medication side effects reviewed with patient in detail and all their questions answered to their satisfaction.    Will RTO in 3 months or sooner if necessary  Patient / Caretaker was advised and educated to call our office  immediately if  patient has any new symptoms of chest pain/shortness of breath, near-syncope, syncope, light headedness sustained palpitations  or any other cardiovascular symptoms before their scheduled follow-up appointment.  ED precautions reviewed    Interval History: 69 y.o.  male  with PMH as below is here for  routine visit  Patient of Dr. Reza  Admitted 1/7-1/11/25  per note review, had a fall, but Daughter states he did not fall at home. His sugar was really josue and he was spaced out and the family sat him in a chair.. likely secondary to orthostatic hypotension.  Orthostatics were positive.  He was placed on scheduled midodrine in addition to his Florinef.  He is also on Cortef for history of adrenal insufficiency and is known to endocrinology outpatient.  Orthostatic hypotension likely related to autonomic dysfunction from longstanding history of diabetes mellitus.     Today,  he is now on midodrine 5 mg TID PRN. He has not needed to take the midodrine since rehab because his BP kept running high  He wears the abdominal binder. He develop a wound from dominguez stocking and has not worn them since rehab.     Patient Active Problem List    Diagnosis Date Noted    Chronic kidney disease, stage 5 ([facility]) 02/07/2025    Acute on chronic heart failure with preserved ejection fraction (HFpEF) ([facility]) 02/07/2025    Leukocytosis 01/07/2025    Type 1 diabetes mellitus with hyperglycemia ([facility]) 09/06/2024    PAD  (peripheral artery disease) ([facility]) 06/13/2024    Stage 2 chronic kidney disease 06/13/2024    Mixed hyperlipidemia 06/13/2024    Morbid (severe) obesity due to excess calories ([facility]) 04/30/2024    Vitamin D deficiency 01/30/2024    Chronic diastolic heart failure ([facility]) 12/20/2023    Prolonged Q-T interval on ECG 12/19/2023    Hypokalemia 12/07/2023    Hypomagnesemia 12/07/2023    Fall 11/10/2023    Adrenal insufficiency ([facility]) 11/10/2023    Orthostatic hypotension 11/10/2023    History of CVA (cerebrovascular accident) 11/10/2023    Spells of decreased attentiveness 03/31/2023    Iron deficiency anemia, unspecified 02/07/2023    Syncope 01/06/2023    Unintentional weight loss 11/29/2022    Paroxysmal atrial fibrillation  10/19/2022    Anemia of chronic disease 10/19/2022    Psychiatric disorder 10/19/2022     Past Medical History:   Diagnosis Date    A-fib ([facility])     Adrenal insufficiency ([facility])     Atrial fibrillation ([facility])     Diabetes mellitus ([facility])     History of stroke 10/19/2022    Obesity, morbid ([facility]) 11/14/2022    Shock ([facility]) 12/14/2023    Stroke ([facility])     Type 2 MI (myocardial infarction) ([facility]) 11/03/2022     Social History     Tobacco Use    Smoking status: Former     Current packs/day: 0.25     Average packs/day: 0.3 packs/day for 30.0 years (7.5 ttl pk-yrs)     Types: Cigarettes    Smokeless tobacco: Never   Vaping Use    Vaping status: Never Used   Substance Use Topics    Alcohol use: Not Currently     Alcohol/week: 5.0 standard drinks of alcohol     Types: 5 Cans of beer per week     Comment: Quit in august 2022    Drug use: Never      Family History   Problem Relation Age of Onset    Heart disease Mother     Cancer Father     Multiple sclerosis Sister      History reviewed. No pertinent surgical history.    Current Outpatient Medications:     albuterol (PROVENTIL HFA,VENTOLIN HFA) 90 mcg/act inhaler, Inhale 2 puffs every 4 (four) hours as needed for wheezing, Disp: , Rfl:     apixaban (Eliquis) 5 mg, Take  1 tablet (5 mg total) by mouth 2 (two) times a day, Disp: 60 tablet, Rfl: 3    aspirin 81 mg chewable tablet, Chew 81 mg daily, Disp: , Rfl:     atorvastatin (LIPITOR) 10 mg tablet, Take 1 tablet by mouth daily, Disp: , Rfl:     Continuous Blood Gluc  (Dexcom G7 ) CYN, Use 1 each continuous, Disp: 1 each, Rfl: 0    Continuous Glucose Sensor (Dexcom G7 Sensor), Use 1 Device every 10 days, Disp: 9 each, Rfl: 1    fludrocortisone (FLORINEF) 0.1 mg tablet, TAKE 2 TABLETS BY MOUTH DAILY., Disp: 60 tablet, Rfl: 0    furosemide (LASIX) 20 mg tablet, Take 1 tablet (20 mg total) by mouth daily, Disp: 30 tablet, Rfl: 2    hydrocortisone (CORTEF) 5 mg tablet, USE 4 TABLETS IN MORNING AND 3 TABLETS DAILY AFTERNOON, Disp: 210 tablet, Rfl: 6    insulin glargine (Toujeo Max SoloStar) 300 units/mL CONCENTRATED U-300 injection pen (2-unit dial), Inject 18 Units under the skin daily at bedtime, Disp: , Rfl:     insulin lispro (HumALOG/ADMELOG) 100 units/mL injection, Inject 6 Units under the skin 3 (three) times a day with meals, Disp: , Rfl:     Insulin Pen Needle (BD Pen Needle Josie 2nd Gen) 32G X 4 MM MISC, FOR USE WITH INSULIN PEN. PHARMACY MAY DISPENSE BRAND COVERED BY INSURANCE., Disp: 100 each, Rfl: 5    Insulin Pen Needle (BD Pen Needle Josie 2nd Gen) 32G X 4 MM MISC, For use with insulin pen 4 times daily. Pharmacy may dispense brand covered by insurance., Disp: 200 each, Rfl: 3    Magnesium Oxide 400 MG CAPS, Take 1 tablet (400 mg total) by mouth 2 (two) times a day, Disp: 180 tablet, Rfl: 3    midodrine (PROAMATINE) 5 mg tablet, Take 1 tablet (5 mg total) by mouth 3 (three) times a day before meals (Patient taking differently: Take 5 mg by mouth if needed), Disp: , Rfl:     potassium chloride (Klor-Con) 10 mEq tablet, Take 1 tablet (10 mEq total) by mouth in the morning, Disp: 30 tablet, Rfl: 0    sertraline (ZOLOFT) 100 mg tablet, Take 100 mg by mouth daily, Disp: , Rfl:     albuterol (2.5 mg/3 mL) 0.083 %  "nebulizer solution, Take 3 mL (2.5 mg total) by nebulization every 6 (six) hours as needed for wheezing or shortness of breath (Patient not taking: Reported on 2/7/2025), Disp: , Rfl:     cyanocobalamin (VITAMIN B-12) 100 MCG tablet, Take 1 tablet (100 mcg total) by mouth daily Do not start before November 15, 2023. (Patient not taking: Reported on 2/7/2025), Disp: 30 tablet, Rfl: 0    ergocalciferol (VITAMIN D2) 50,000 units, Take 1 capsule (50,000 Units total) by mouth once a week (Patient not taking: Reported on 2/7/2025), Disp: 12 capsule, Rfl: 0    ferrous sulfate 324 (65 Fe) mg, Take 1 tablet (324 mg total) by mouth daily before breakfast (Patient not taking: Reported on 2/7/2025), Disp: 90 tablet, Rfl: 2    Allergies   Allergen Reactions    Imipramine Other (See Comments)    Lisinopril Other (See Comments)    Paroxetine Other (See Comments)    Penicillins Hives    Rosiglitazone Other (See Comments)    Troglitazone Other (See Comments)       Vitals:    02/07/25 1444   BP: 136/68   BP Location: Left arm   Patient Position: Sitting   Cuff Size: Standard   Pulse: 74   Weight: 83.4 kg (183 lb 14.4 oz)   Height: 5' 4\" (1.626 m)        Vitals:    02/07/25 1444   Weight: 83.4 kg (183 lb 14.4 oz)      Height: 5' 4\" (162.6 cm)   Body mass index is 31.57 kg/m².    Labs:   No results found for: \"CHOLESTEROL\", \"TRIG\", \"HDL\", \"LDLCALC\"   Lab Results   Component Value Date    SODIUM 140 01/21/2025    K 3.5 01/21/2025     01/21/2025    CREATININE 0.90 01/21/2025    EGFR 88 01/21/2025    BUN 8 01/21/2025    CO2 28 01/21/2025    ALT 19 01/21/2025    AST 19 01/21/2025    TSH 0.50 08/17/2022    INR 1.24 (H) 03/07/2024    GLUF 65 12/05/2023    HGBA1C 8.3 (H) 01/19/2025    WBC 10.90 (H) 01/21/2025    HGB 10.8 (L) 01/21/2025    HCT 33.0 (L) 01/21/2025     01/21/2025         Imaging: No results found.    ECG:  Sinus rhythm with premature supraventricular complexes.  Nonspecific ST abnormality.  74 bpm   Reviewed by " NACHO Campbell      Review of Systems   Constitutional: Negative.   Cardiovascular:  Positive for leg swelling.   Respiratory: Negative.     Gastrointestinal: Negative.    All other systems reviewed and are negative.    Except as noted in HPI, is otherwise reviewed in detail and a 12 point review of systems is negative.    Physical Exam  Vitals reviewed.   Constitutional:       General: He is not in acute distress.     Appearance: Normal appearance. He is not diaphoretic.   HENT:      Head: Normocephalic and atraumatic.   Eyes:      General: No scleral icterus.  Cardiovascular:      Rate and Rhythm: Normal rate and regular rhythm.      Pulses: Normal pulses.           Radial pulses are 2+ on the right side and 2+ on the left side.      Heart sounds: Normal heart sounds, S1 normal and S2 normal.   Pulmonary:      Effort: Pulmonary effort is normal. No tachypnea or respiratory distress.      Breath sounds: Normal breath sounds. No wheezing or rales.   Abdominal:      General: Bowel sounds are normal. There is no distension.      Palpations: Abdomen is soft.   Musculoskeletal:      Right lower leg: Edema (+1) present.      Left lower leg: Edema (+1) present.   Skin:     General: Skin is warm and dry.      Capillary Refill: Capillary refill takes less than 2 seconds.   Neurological:      Mental Status: He is alert and oriented to person, place, and time.      Gait: Gait normal.   Psychiatric:         Mood and Affect: Mood normal.         Behavior: Behavior normal.          **Please Note: This note may have been constructed using a voice recognition system**     Thank you for the opportunity to participate in the care of this patient.   NACHO Campbell

## 2025-02-07 NOTE — ASSESSMENT & PLAN NOTE
BP is controlled  Maintained on midodrine 5 mg TID,PRN.  Florinef 0.2 mg daily,   Uses abdominal binder and compression stockings.

## 2025-02-07 NOTE — ASSESSMENT & PLAN NOTE
Wt Readings from Last 3 Encounters:   02/07/25 83.4 kg (183 lb 14.4 oz)   02/04/25 81.6 kg (180 lb)   01/19/25 87.1 kg (192 lb 0.3 oz)   Weight 169-171 baseline   - weight has been stable since discharge  -beta-blocker:  none  --Diuretic: Lasix 20 mg daily + 10 meq potassium  --2 g sodium diet, 1800 cc fluid restriction. Daily weights.

## 2025-02-07 NOTE — ASSESSMENT & PLAN NOTE
Wt Readings from Last 3 Encounters:   02/07/25 83.4 kg (183 lb 14.4 oz)   02/04/25 81.6 kg (180 lb)   01/19/25 87.1 kg (192 lb 0.3 oz)   See above

## 2025-02-07 NOTE — ASSESSMENT & PLAN NOTE
Lab Results   Component Value Date    EGFR 88 01/21/2025    EGFR 89 01/20/2025    EGFR 86 01/19/2025    CREATININE 0.90 01/21/2025    CREATININE 0.87 01/20/2025    CREATININE 0.91 01/19/2025   stable

## 2025-02-07 NOTE — ASSESSMENT & PLAN NOTE
Maintained on hydrocortisone 20 mg a.m. and 15 mg p.m. along with Florinef 0.2 mg daily   Secondary adrenal insufficiency following with endocrinology

## 2025-02-10 RX ORDER — INSULIN GLARGINE 300 U/ML
18 INJECTION, SOLUTION SUBCUTANEOUS
Qty: 6 ML | Refills: 0 | OUTPATIENT
Start: 2025-02-10

## 2025-02-14 ENCOUNTER — APPOINTMENT (EMERGENCY)
Dept: RADIOLOGY | Facility: HOSPITAL | Age: 70
DRG: 481 | End: 2025-02-14
Payer: COMMERCIAL

## 2025-02-14 ENCOUNTER — ANESTHESIA (INPATIENT)
Dept: PERIOP | Facility: HOSPITAL | Age: 70
DRG: 481 | End: 2025-02-14
Payer: COMMERCIAL

## 2025-02-14 ENCOUNTER — HOSPITAL ENCOUNTER (INPATIENT)
Facility: HOSPITAL | Age: 70
LOS: 4 days | Discharge: NURSING, REHAB OR LONG-TERM CARE | DRG: 481 | End: 2025-02-18
Attending: EMERGENCY MEDICINE | Admitting: SURGERY
Payer: COMMERCIAL

## 2025-02-14 ENCOUNTER — ANESTHESIA EVENT (INPATIENT)
Dept: PERIOP | Facility: HOSPITAL | Age: 70
DRG: 481 | End: 2025-02-14
Payer: COMMERCIAL

## 2025-02-14 DIAGNOSIS — S72.002A CLOSED FRACTURE OF LEFT HIP, INITIAL ENCOUNTER (HCC): Primary | ICD-10-CM

## 2025-02-14 DIAGNOSIS — E87.6 HYPOKALEMIA: ICD-10-CM

## 2025-02-14 PROBLEM — S72.142A CLOSED COMMINUTED INTERTROCHANTERIC FRACTURE OF LEFT FEMUR (HCC): Status: ACTIVE | Noted: 2025-02-14

## 2025-02-14 LAB
2HR DELTA HS TROPONIN: -4 NG/L
4HR DELTA HS TROPONIN: -6 NG/L
ABO GROUP BLD: NORMAL
ABO GROUP BLD: NORMAL
ANION GAP SERPL CALCULATED.3IONS-SCNC: 8 MMOL/L (ref 4–13)
APTT PPP: 32 SECONDS (ref 23–34)
BASOPHILS # BLD AUTO: 0.03 THOUSANDS/ΜL (ref 0–0.1)
BASOPHILS NFR BLD AUTO: 0 % (ref 0–1)
BLD GP AB SCN SERPL QL: NEGATIVE
BUN SERPL-MCNC: 15 MG/DL (ref 5–25)
CALCIUM SERPL-MCNC: 7.8 MG/DL (ref 8.4–10.2)
CARDIAC TROPONIN I PNL SERPL HS: 49 NG/L (ref ?–50)
CARDIAC TROPONIN I PNL SERPL HS: 51 NG/L (ref ?–50)
CARDIAC TROPONIN I PNL SERPL HS: 55 NG/L (ref ?–50)
CHLORIDE SERPL-SCNC: 102 MMOL/L (ref 96–108)
CO2 SERPL-SCNC: 31 MMOL/L (ref 21–32)
CREAT SERPL-MCNC: 0.93 MG/DL (ref 0.6–1.3)
EOSINOPHIL # BLD AUTO: 0.07 THOUSAND/ΜL (ref 0–0.61)
EOSINOPHIL NFR BLD AUTO: 1 % (ref 0–6)
ERYTHROCYTE [DISTWIDTH] IN BLOOD BY AUTOMATED COUNT: 14.6 % (ref 11.6–15.1)
GFR SERPL CREATININE-BSD FRML MDRD: 84 ML/MIN/1.73SQ M
GLUCOSE SERPL-MCNC: 114 MG/DL (ref 65–140)
GLUCOSE SERPL-MCNC: 114 MG/DL (ref 65–140)
GLUCOSE SERPL-MCNC: 129 MG/DL (ref 65–140)
HCT VFR BLD AUTO: 28.5 % (ref 36.5–49.3)
HGB BLD-MCNC: 9.2 G/DL (ref 12–17)
IMM GRANULOCYTES # BLD AUTO: 0.07 THOUSAND/UL (ref 0–0.2)
IMM GRANULOCYTES NFR BLD AUTO: 1 % (ref 0–2)
INR PPP: 1.39 (ref 0.85–1.19)
LYMPHOCYTES # BLD AUTO: 0.94 THOUSANDS/ΜL (ref 0.6–4.47)
LYMPHOCYTES NFR BLD AUTO: 7 % (ref 14–44)
MAGNESIUM SERPL-MCNC: 1.6 MG/DL (ref 1.9–2.7)
MCH RBC QN AUTO: 31.4 PG (ref 26.8–34.3)
MCHC RBC AUTO-ENTMCNC: 32.3 G/DL (ref 31.4–37.4)
MCV RBC AUTO: 97 FL (ref 82–98)
MONOCYTES # BLD AUTO: 1.65 THOUSAND/ΜL (ref 0.17–1.22)
MONOCYTES NFR BLD AUTO: 11 % (ref 4–12)
NEUTROPHILS # BLD AUTO: 11.76 THOUSANDS/ΜL (ref 1.85–7.62)
NEUTS SEG NFR BLD AUTO: 80 % (ref 43–75)
NRBC BLD AUTO-RTO: 0 /100 WBCS
PHOSPHATE SERPL-MCNC: 3.4 MG/DL (ref 2.3–4.1)
PLATELET # BLD AUTO: 170 THOUSANDS/UL (ref 149–390)
PMV BLD AUTO: 10.6 FL (ref 8.9–12.7)
POTASSIUM SERPL-SCNC: 2.4 MMOL/L (ref 3.5–5.3)
PROCALCITONIN SERPL-MCNC: 0.24 NG/ML
PROTHROMBIN TIME: 17.3 SECONDS (ref 12.3–15)
RBC # BLD AUTO: 2.93 MILLION/UL (ref 3.88–5.62)
RH BLD: POSITIVE
RH BLD: POSITIVE
SODIUM SERPL-SCNC: 141 MMOL/L (ref 135–147)
SPECIMEN EXPIRATION DATE: NORMAL
WBC # BLD AUTO: 14.52 THOUSAND/UL (ref 4.31–10.16)

## 2025-02-14 PROCEDURE — 71045 X-RAY EXAM CHEST 1 VIEW: CPT

## 2025-02-14 PROCEDURE — 84100 ASSAY OF PHOSPHORUS: CPT

## 2025-02-14 PROCEDURE — 85025 COMPLETE CBC W/AUTO DIFF WBC: CPT

## 2025-02-14 PROCEDURE — 86850 RBC ANTIBODY SCREEN: CPT | Performed by: ORTHOPAEDIC SURGERY

## 2025-02-14 PROCEDURE — 73502 X-RAY EXAM HIP UNI 2-3 VIEWS: CPT

## 2025-02-14 PROCEDURE — 86923 COMPATIBILITY TEST ELECTRIC: CPT

## 2025-02-14 PROCEDURE — 85610 PROTHROMBIN TIME: CPT

## 2025-02-14 PROCEDURE — 80048 BASIC METABOLIC PNL TOTAL CA: CPT

## 2025-02-14 PROCEDURE — 85730 THROMBOPLASTIN TIME PARTIAL: CPT

## 2025-02-14 PROCEDURE — 96365 THER/PROPH/DIAG IV INF INIT: CPT

## 2025-02-14 PROCEDURE — 86900 BLOOD TYPING SEROLOGIC ABO: CPT | Performed by: ORTHOPAEDIC SURGERY

## 2025-02-14 PROCEDURE — 82948 REAGENT STRIP/BLOOD GLUCOSE: CPT

## 2025-02-14 PROCEDURE — 83735 ASSAY OF MAGNESIUM: CPT

## 2025-02-14 PROCEDURE — 70450 CT HEAD/BRAIN W/O DYE: CPT

## 2025-02-14 PROCEDURE — 83880 ASSAY OF NATRIURETIC PEPTIDE: CPT

## 2025-02-14 PROCEDURE — 99223 1ST HOSP IP/OBS HIGH 75: CPT | Performed by: ORTHOPAEDIC SURGERY

## 2025-02-14 PROCEDURE — 36415 COLL VENOUS BLD VENIPUNCTURE: CPT

## 2025-02-14 PROCEDURE — 73552 X-RAY EXAM OF FEMUR 2/>: CPT

## 2025-02-14 PROCEDURE — 84484 ASSAY OF TROPONIN QUANT: CPT | Performed by: ORTHOPAEDIC SURGERY

## 2025-02-14 PROCEDURE — 93005 ELECTROCARDIOGRAM TRACING: CPT

## 2025-02-14 PROCEDURE — 86901 BLOOD TYPING SEROLOGIC RH(D): CPT | Performed by: ORTHOPAEDIC SURGERY

## 2025-02-14 PROCEDURE — 99284 EMERGENCY DEPT VISIT MOD MDM: CPT

## 2025-02-14 PROCEDURE — 84145 PROCALCITONIN (PCT): CPT

## 2025-02-14 RX ORDER — ACETAMINOPHEN 325 MG/1
650 TABLET ORAL ONCE
Status: COMPLETED | OUTPATIENT
Start: 2025-02-14 | End: 2025-02-14

## 2025-02-14 RX ORDER — HYDROMORPHONE HCL IN WATER/PF 6 MG/30 ML
0.2 PATIENT CONTROLLED ANALGESIA SYRINGE INTRAVENOUS EVERY 2 HOUR PRN
Refills: 0 | Status: DISCONTINUED | OUTPATIENT
Start: 2025-02-14 | End: 2025-02-18 | Stop reason: HOSPADM

## 2025-02-14 RX ORDER — POTASSIUM CHLORIDE 14.9 MG/ML
20 INJECTION INTRAVENOUS
Status: COMPLETED | OUTPATIENT
Start: 2025-02-14 | End: 2025-02-14

## 2025-02-14 RX ORDER — MIDODRINE HYDROCHLORIDE 5 MG/1
5 TABLET ORAL
Status: DISCONTINUED | OUTPATIENT
Start: 2025-02-15 | End: 2025-02-14

## 2025-02-14 RX ORDER — MAGNESIUM SULFATE HEPTAHYDRATE 40 MG/ML
2 INJECTION, SOLUTION INTRAVENOUS ONCE
Status: DISCONTINUED | OUTPATIENT
Start: 2025-02-14 | End: 2025-02-14

## 2025-02-14 RX ORDER — MIDODRINE HYDROCHLORIDE 5 MG/1
5 TABLET ORAL 3 TIMES DAILY PRN
Status: DISCONTINUED | OUTPATIENT
Start: 2025-02-14 | End: 2025-02-18 | Stop reason: HOSPADM

## 2025-02-14 RX ORDER — INSULIN LISPRO 100 [IU]/ML
1-6 INJECTION, SOLUTION INTRAVENOUS; SUBCUTANEOUS EVERY 6 HOURS SCHEDULED
Status: DISCONTINUED | OUTPATIENT
Start: 2025-02-14 | End: 2025-02-15

## 2025-02-14 RX ORDER — FLUDROCORTISONE ACETATE 0.1 MG/1
0.2 TABLET ORAL DAILY
Status: DISCONTINUED | OUTPATIENT
Start: 2025-02-15 | End: 2025-02-18 | Stop reason: HOSPADM

## 2025-02-14 RX ORDER — HYDROCORTISONE 5 MG/1
5 TABLET ORAL DAILY
Status: DISCONTINUED | OUTPATIENT
Start: 2025-02-15 | End: 2025-02-18 | Stop reason: HOSPADM

## 2025-02-14 RX ORDER — POTASSIUM CHLORIDE 14.9 MG/ML
20 INJECTION INTRAVENOUS
Status: DISCONTINUED | OUTPATIENT
Start: 2025-02-14 | End: 2025-02-14

## 2025-02-14 RX ORDER — FUROSEMIDE 20 MG/1
20 TABLET ORAL DAILY
Status: DISCONTINUED | OUTPATIENT
Start: 2025-02-15 | End: 2025-02-18 | Stop reason: HOSPADM

## 2025-02-14 RX ORDER — ACETAMINOPHEN 325 MG/1
975 TABLET ORAL EVERY 8 HOURS SCHEDULED
Status: DISCONTINUED | OUTPATIENT
Start: 2025-02-14 | End: 2025-02-18 | Stop reason: HOSPADM

## 2025-02-14 RX ORDER — ATORVASTATIN CALCIUM 10 MG/1
10 TABLET, FILM COATED ORAL DAILY
Status: DISCONTINUED | OUTPATIENT
Start: 2025-02-15 | End: 2025-02-18 | Stop reason: HOSPADM

## 2025-02-14 RX ORDER — POTASSIUM CHLORIDE 1500 MG/1
40 TABLET, EXTENDED RELEASE ORAL ONCE
Status: COMPLETED | OUTPATIENT
Start: 2025-02-14 | End: 2025-02-14

## 2025-02-14 RX ORDER — SODIUM CHLORIDE, SODIUM LACTATE, POTASSIUM CHLORIDE, CALCIUM CHLORIDE 600; 310; 30; 20 MG/100ML; MG/100ML; MG/100ML; MG/100ML
125 INJECTION, SOLUTION INTRAVENOUS CONTINUOUS
Status: DISCONTINUED | OUTPATIENT
Start: 2025-02-15 | End: 2025-02-14

## 2025-02-14 RX ORDER — HEPARIN SODIUM 5000 [USP'U]/ML
5000 INJECTION, SOLUTION INTRAVENOUS; SUBCUTANEOUS EVERY 8 HOURS SCHEDULED
Status: DISCONTINUED | OUTPATIENT
Start: 2025-02-14 | End: 2025-02-14

## 2025-02-14 RX ORDER — ASPIRIN 81 MG/1
81 TABLET, CHEWABLE ORAL DAILY
Status: DISCONTINUED | OUTPATIENT
Start: 2025-02-15 | End: 2025-02-18 | Stop reason: HOSPADM

## 2025-02-14 RX ORDER — KETOROLAC TROMETHAMINE 30 MG/ML
15 INJECTION, SOLUTION INTRAMUSCULAR; INTRAVENOUS ONCE
Status: DISCONTINUED | OUTPATIENT
Start: 2025-02-14 | End: 2025-02-14

## 2025-02-14 RX ORDER — SERTRALINE HYDROCHLORIDE 100 MG/1
100 TABLET, FILM COATED ORAL DAILY
Status: DISCONTINUED | OUTPATIENT
Start: 2025-02-15 | End: 2025-02-18 | Stop reason: HOSPADM

## 2025-02-14 RX ORDER — POTASSIUM CHLORIDE 14.9 MG/ML
20 INJECTION INTRAVENOUS
Status: COMPLETED | OUTPATIENT
Start: 2025-02-14 | End: 2025-02-15

## 2025-02-14 RX ORDER — MAGNESIUM SULFATE HEPTAHYDRATE 40 MG/ML
4 INJECTION, SOLUTION INTRAVENOUS ONCE
Status: COMPLETED | OUTPATIENT
Start: 2025-02-14 | End: 2025-02-15

## 2025-02-14 RX ORDER — ALBUTEROL SULFATE 90 UG/1
2 INHALANT RESPIRATORY (INHALATION) EVERY 4 HOURS PRN
Status: DISCONTINUED | OUTPATIENT
Start: 2025-02-14 | End: 2025-02-18 | Stop reason: HOSPADM

## 2025-02-14 RX ORDER — OXYCODONE HYDROCHLORIDE 5 MG/1
5 TABLET ORAL EVERY 4 HOURS PRN
Refills: 0 | Status: DISCONTINUED | OUTPATIENT
Start: 2025-02-14 | End: 2025-02-18 | Stop reason: HOSPADM

## 2025-02-14 RX ADMIN — POTASSIUM CHLORIDE 20 MEQ: 14.9 INJECTION, SOLUTION INTRAVENOUS at 16:52

## 2025-02-14 RX ADMIN — POTASSIUM CHLORIDE 20 MEQ: 14.9 INJECTION, SOLUTION INTRAVENOUS at 19:56

## 2025-02-14 RX ADMIN — OXYCODONE HYDROCHLORIDE 5 MG: 5 TABLET ORAL at 19:52

## 2025-02-14 RX ADMIN — ACETAMINOPHEN 650 MG: 325 TABLET, FILM COATED ORAL at 19:52

## 2025-02-14 RX ADMIN — POTASSIUM CHLORIDE 20 MEQ: 14.9 INJECTION, SOLUTION INTRAVENOUS at 22:25

## 2025-02-14 RX ADMIN — MAGNESIUM SULFATE HEPTAHYDRATE 4 G: 40 INJECTION, SOLUTION INTRAVENOUS at 21:09

## 2025-02-14 RX ADMIN — POTASSIUM CHLORIDE 40 MEQ: 1500 TABLET, EXTENDED RELEASE ORAL at 16:52

## 2025-02-14 NOTE — ASSESSMENT & PLAN NOTE
Assessment:  69 y.o.male with left femur intertrochanteric fracture. He is indicated for a left cephalomedullary nail.    NWB LLE  Plan for OR  NPO now  Preop labs drawn  PT/OT  Pain control  DVT ppx: per primary team  Medical management per primary team

## 2025-02-14 NOTE — ED ATTENDING ATTESTATION
2/14/2025  I, Hugh Dunbar DO, saw and evaluated the patient. I have discussed the patient with the resident/non-physician practitioner and agree with the resident's/non-physician practitioner's findings, Plan of Care, and MDM as documented in the resident's/non-physician practitioner's note, except where noted. All available labs and Radiology studies were reviewed.  I was present for key portions of any procedure(s) performed by the resident/non-physician practitioner and I was immediately available to provide assistance.       At this point I agree with the current assessment done in the Emergency Department.  I have conducted an independent evaluation of this patient a history and physical is as follows:    69 yom, fall, mechanical. Onto left hip  No lightheadedness. Is on eliquis.   Hx orthostatic, on midodrine   Could not bear weight    Only c/o hip pain.     Exam: normal vitals  NAD  No obvious trauma  RRR, CTA  left hip: Shortened and rotated    Concern for hip fracture plan labs given limited history as patient has some baseline cognitive deterioration, x-ray interpreted by me shows femoral neck impacted comminuted fracture, will admit      ED Course         Critical Care Time  Procedures

## 2025-02-14 NOTE — ED PROVIDER NOTES
Time reflects when diagnosis was documented in both MDM as applicable and the Disposition within this note       Time User Action Codes Description Comment    2/14/2025  1:24 PM Sandra Chairez [S72.002A] Closed fracture of left hip, initial encounter ([facility])     2/14/2025  5:28 PM Sandra Chairez [E87.6] Hypokalemia           ED Disposition       ED Disposition   Admit    Condition   Stable    Date/Time   Fri Feb 14, 2025  4:34 PM    Comment                  Assessment & Plan       Medical Decision Making  Amount and/or Complexity of Data Reviewed  Labs: ordered.  Radiology: ordered.    Risk  OTC drugs.  Prescription drug management.  Decision regarding hospitalization.    Pt is a 69-year-old male, on Eliquis for A-fib, CHF, type 1 diabetes on insulin, orthostatic on midodrine  Presents for a mechanical fall onto the left hip this morning and could not bear weight since then.  Denies hitting his head or loss of consciousness.  Patient is not a good historian.    Initial presentation pt is no acute distress.    On exam   General: Baseline mentation no acute distress.  Head: Normocephalic, atraumatic, nontender.  Eyes: PERRL, EOM-I. No diplopia.   No hyphema.   No subconjunctival hemorrhages.  Symmetrical lids.   ENT: Atraumatic external nose and ears.    MMM  No malocclusion. No stridor. Normal phonation. No drooling. Normal swallowing.   Neck: Symmetric, trachea midline. No JVD.  CV: RRR. +S1/S2  No murmurs or gallops  Peripheral pulses +2 throughout. No chest wall tenderness.   Lungs:   Unlabored No retractions  CTAB, lungs sounds equal bilateral.   No tachypnea.   Abd: +BS, soft, NT/ND.   MSK:   Left hip tenderness and mild swelling, left lower extremity shortened and mildly externally rotated.  No open wounds.  Back:   No rashes  Skin: Dry, intact.   Neuro: AAOx3, GCS 15, CN II-XII grossly intact.   Motor grossly intact.  Psychiatric/Behavioral: Appropriate mood and affect   Exam:  deferred      Ddx: Given unclear history, and baseline cognitive deterioration will get CT head to rule out acute intracranial abnormality given Eliquis use.  Left hip x-ray and basic labs.    Plan: Left hip x-ray per my interpretation remarkable for intertrochanteric fracture with no dislocation.  Labs remarkable for acute hypokalemia at 2.4, no EKG changes per my interpretation.  Added mag and Phos at this time, repleted mag, pain control, consulted Ortho and admitted to trauma service.        ED Course as of 02/15/25 1652   Fri Feb 14, 2025   1327 Left intertrochanteric fracture   1327 Consulted Ortho   1446 US IV, in left arm   1658 Trauma consulted       Medications   albuterol (PROVENTIL HFA,VENTOLIN HFA) inhaler 2 puff (has no administration in time range)   apixaban (ELIQUIS) tablet 5 mg ( Oral Unheld by provider 2/14/25 1849)   aspirin chewable tablet 81 mg (has no administration in time range)   atorvastatin (LIPITOR) tablet 10 mg (has no administration in time range)   fludrocortisone (FLORINEF) tablet 0.2 mg (has no administration in time range)   furosemide (LASIX) tablet 20 mg ( Oral Held by provider 2/14/25 1848)   hydrocortisone (CORTEF) tablet 5 mg (has no administration in time range)   sertraline (ZOLOFT) tablet 100 mg (has no administration in time range)   insulin lispro (HumALOG/ADMELOG) 100 units/mL subcutaneous injection 1-6 Units ( Subcutaneous Not Given 2/14/25 2057)   acetaminophen (TYLENOL) tablet 975 mg (has no administration in time range)   oxyCODONE (ROXICODONE) split tablet 2.5 mg ( Oral See Alternative 2/14/25 1952)     Or   oxyCODONE (ROXICODONE) IR tablet 5 mg (5 mg Oral Given 2/14/25 1952)   HYDROmorphone HCl (DILAUDID) injection 0.2 mg (has no administration in time range)   midodrine (PROAMATINE) tablet 5 mg (has no administration in time range)   acetaminophen (TYLENOL) tablet 650 mg (650 mg Oral Given 2/14/25 1952)   potassium chloride (Klor-Con M20) CR tablet 40 mEq (40 mEq  Oral Given 2/14/25 1652)   potassium chloride 20 mEq IVPB (premix) (20 mEq Intravenous New Bag 2/14/25 1956)   magnesium sulfate 4 g/100 mL IVPB (premix) 4 g (4 g Intravenous New Bag 2/14/25 2109)   potassium chloride 20 mEq IVPB (premix) (has no administration in time range)       ED Risk Strat Scores                                              History of Present Illness       Chief Complaint   Patient presents with    Hip Pain     BIBA for fall when patient got out of bed, c/o left hip pain       Past Medical History:   Diagnosis Date    A-fib (HCC)     Adrenal insufficiency (HCC)     Atrial fibrillation (HCC)     Diabetes mellitus (HCC)     History of stroke 10/19/2022    Obesity, morbid (HCC) 11/14/2022    Shock (HCC) 12/14/2023    Stroke (HCC)     Type 2 MI (myocardial infarction) (HCC) 11/03/2022      History reviewed. No pertinent surgical history.   Family History   Problem Relation Age of Onset    Heart disease Mother     Cancer Father     Multiple sclerosis Sister       Social History     Tobacco Use    Smoking status: Former     Current packs/day: 0.25     Average packs/day: 0.3 packs/day for 30.0 years (7.5 ttl pk-yrs)     Types: Cigarettes    Smokeless tobacco: Never   Vaping Use    Vaping status: Never Used   Substance Use Topics    Alcohol use: Never     Alcohol/week: 5.0 standard drinks of alcohol     Types: 5 Cans of beer per week     Comment: Quit in august 2022    Drug use: Never      E-Cigarette/Vaping    E-Cigarette Use Never User       E-Cigarette/Vaping Substances    Nicotine No     THC No     CBD No     Flavoring No     Other No     Unknown No       I have reviewed and agree with the history as documented.     HPI    Review of Systems        Objective       ED Triage Vitals   Temperature Pulse Blood Pressure Respirations SpO2 Patient Position - Orthostatic VS   02/14/25 1126 02/14/25 1128 02/14/25 1128 02/14/25 1126 02/14/25 1126 02/14/25 1126   98.9 °F (37.2 °C) 83 148/80 18 99 % Sitting       Temp Source Heart Rate Source BP Location FiO2 (%) Pain Score    02/14/25 1126 02/14/25 1128 02/14/25 1126 -- 02/14/25 1126    Oral Monitor Right arm  8      Vitals      Date and Time Temp Pulse SpO2 Resp BP Pain Score FACES Pain Rating User   02/15/25 1556 98.9 °F (37.2 °C) 78 100 % 16 118/54 -- -- DII   02/15/25 1437 -- -- -- -- -- Med Not Given for Pain - for MAR use only -- SQ   02/15/25 1416 98.4 °F (36.9 °C) 76 96 % -- 102/46 -- -- DII   02/15/25 1330 -- 75 96 % -- 91/44 -- -- SQ   02/15/25 1223 98 °F (36.7 °C) 77 100 % 16 116/67 -- -- DII   02/15/25 1117 -- -- -- -- -- No Pain -- SQ   02/15/25 1106 98 °F (36.7 °C) 72 100 % 20 120/56 -- -- DII   02/15/25 1015 98.1 °F (36.7 °C) 72 96 % 22 115/55 No Pain --    02/15/25 1000 -- 72 96 % 20 96/54 No Pain --    02/15/25 0957 -- 72 95 % 21 94/48 No Pain --    02/15/25 0946 99.3 °F (37.4 °C) 71 99 % 12 94/48 -- --    02/15/25 0710 -- -- -- -- -- No Pain -- EJE   02/15/25 0330 98.6 °F (37 °C) 75 92 % 20 116/55 -- -- DII   02/14/25 2226 -- 69 95 % -- -- -- -- [facility]   02/14/25 2203 -- -- -- -- 114/54 8 -- [facility]   02/14/25 2202 99.5 °F (37.5 °C) -- -- -- -- -- -- [facility]   02/14/25 1952 -- -- -- -- -- 8 -- [facility]   02/14/25 1900 -- 76 98 % 17 128/61 -- -- [facility]   02/14/25 1500 -- 84 96 % 12 132/60 -- -- [facility]   02/14/25 1128 -- 83 -- -- 148/80 -- -- EL   02/14/25 1126 98.9 °F (37.2 °C) -- 99 % 18 -- 8 -- EL            Physical Exam    Results Reviewed       Procedure Component Value Units Date/Time    Basic metabolic panel [[id number]]  (Abnormal) Collected: 02/15/25 0606    Lab Status: Final result Specimen: Blood from Arm, Right Updated: 02/15/25 0703     Sodium 142 mmol/L      Potassium 3.4 mmol/L      Chloride 105 mmol/L      CO2 29 mmol/L      ANION GAP 8 mmol/L      BUN 16 mg/dL      Creatinine 0.85 mg/dL      Glucose 141 mg/dL      Calcium 7.8 mg/dL      eGFR 90 ml/min/1.73sq m     Narrative:      National Kid[employer] Disease Foundation guidelines for Chronic Kidney Disease  (CKD):     Stage 1 with normal or high GFR (GFR > 90 mL/min/1.73 square meters)    Stage 2 Mild CKD (GFR = 60-89 mL/min/1.73 square meters)    Stage 3A Moderate CKD (GFR = 45-59 mL/min/1.73 square meters)    Stage 3B Moderate CKD (GFR = 30-44 mL/min/1.73 square meters)    Stage 4 Severe CKD (GFR = 15-29 mL/min/1.73 square meters)    Stage 5 End Stage CKD (GFR <15 mL/min/1.73 square meters)  Note: GFR calculation is accurate only with a steady state creatinine    B-Type Natriuretic Peptide(BNP) [[id number]]  (Abnormal) Collected: 02/14/25 1529    Lab Status: Final result Specimen: Blood from Arm, Left Updated: 02/15/25 0235      pg/mL     Basic metabolic panel [[id number]]  (Abnormal) Collected: 02/15/25 0015    Lab Status: Final result Specimen: Blood from Arm, Right Updated: 02/15/25 0047     Sodium 143 mmol/L      Potassium 3.2 mmol/L      Chloride 103 mmol/L      CO2 33 mmol/L      ANION GAP 7 mmol/L      BUN 15 mg/dL      Creatinine 0.86 mg/dL      Glucose 152 mg/dL      Calcium 7.7 mg/dL      eGFR 89 ml/min/1.73sq m     Narrative:      National Kidney Disease Foundation guidelines for Chronic Kidney Disease (CKD):     Stage 1 with normal or high GFR (GFR > 90 mL/min/1.73 square meters)    Stage 2 Mild CKD (GFR = 60-89 mL/min/1.73 square meters)    Stage 3A Moderate CKD (GFR = 45-59 mL/min/1.73 square meters)    Stage 3B Moderate CKD (GFR = 30-44 mL/min/1.73 square meters)    Stage 4 Severe CKD (GFR = 15-29 mL/min/1.73 square meters)    Stage 5 End Stage CKD (GFR <15 mL/min/1.73 square meters)  Note: GFR calculation is accurate only with a steady state creatinine    Magnesium [[id number]]  (Normal) Collected: 02/15/25 0015    Lab Status: Final result Specimen: Blood from Arm, Right Updated: 02/15/25 0047     Magnesium 2.3 mg/dL     HS Troponin I 4hr [[id number]]  (Normal) Collected: 02/14/25 2238    Lab Status: Final result Specimen: Blood from Hand, Right Updated: 02/14/25 2303     hs TnI 4hr 49 ng/L       Delta 4hr hsTnI -6 ng/L     Procalcitonin [[id number]]  (Normal) Collected: 02/14/25 1705    Lab Status: Final result Specimen: Blood from Arm, Left Updated: 02/14/25 2020     Procalcitonin 0.24 ng/ml     Fingerstick Glucose (POCT) [[id number]]  (Normal) Collected: 02/14/25 2001    Lab Status: Final result Specimen: Blood Updated: 02/14/25 2002     POC Glucose 114 mg/dl     HS Troponin I 2hr [[id number]]  (Abnormal) Collected: 02/14/25 1705    Lab Status: Final result Specimen: Blood from Arm, Left Updated: 02/14/25 1735     hs TnI 2hr 51 ng/L      Delta 2hr hsTnI -4 ng/L     Magnesium [[id number]]  (Abnormal) Collected: 02/14/25 1705    Lab Status: Final result Specimen: Blood from Arm, Left Updated: 02/14/25 1734     Magnesium 1.6 mg/dL     Phosphorus [[id number]]  (Normal) Collected: 02/14/25 1705    Lab Status: Final result Specimen: Blood from Arm, Left Updated: 02/14/25 1734     Phosphorus 3.4 mg/dL     Basic metabolic panel [[id number]]  (Abnormal) Collected: 02/14/25 1529    Lab Status: Final result Specimen: Blood from Arm, Left Updated: 02/14/25 1621     Sodium 141 mmol/L      Potassium 2.4 mmol/L      Chloride 102 mmol/L      CO2 31 mmol/L      ANION GAP 8 mmol/L      BUN 15 mg/dL      Creatinine 0.93 mg/dL      Glucose 114 mg/dL      Calcium 7.8 mg/dL      eGFR 84 ml/min/1.73sq m     Narrative:      National Kidney Disease Foundation guidelines for Chronic Kidney Disease (CKD):     Stage 1 with normal or high GFR (GFR > 90 mL/min/1.73 square meters)    Stage 2 Mild CKD (GFR = 60-89 mL/min/1.73 square meters)    Stage 3A Moderate CKD (GFR = 45-59 mL/min/1.73 square meters)    Stage 3B Moderate CKD (GFR = 30-44 mL/min/1.73 square meters)    Stage 4 Severe CKD (GFR = 15-29 mL/min/1.73 square meters)    Stage 5 End Stage CKD (GFR <15 mL/min/1.73 square meters)  Note: GFR calculation is accurate only with a steady state creatinine    HS Troponin 0hr (reflex protocol) [[id number]]  (Abnormal) Collected: 02/14/25 2241     Lab Status: Final result Specimen: Blood from Arm, Left Updated: 02/14/25 1606     hs TnI 0hr 55 ng/L     Protime-INR [[id number]]  (Abnormal) Collected: 02/14/25 1529    Lab Status: Final result Specimen: Blood from Arm, Left Updated: 02/14/25 1555     Protime 17.3 seconds      INR 1.39    Narrative:      INR Therapeutic Range    Indication                                             INR Range      Atrial Fibrillation                                               2.0-3.0  Hypercoagulable State                                    2.0.2.3  Left Ventricular Asist Device                            2.0-3.0  Mechanical Heart Valve                                  -    Aortic(with afib, MI, embolism, HF, LA enlargement,    and/or coagulopathy)                                     2.0-3.0 (2.5-3.5)     Mitral                                                             2.5-3.5  Prosthetic/Bioprosthetic Heart Valve               2.0-3.0  Venous thromboembolism (VTE: VT, PE        2.0-3.0    APTT [[id number]]  (Normal) Collected: 02/14/25 1529    Lab Status: Final result Specimen: Blood from Arm, Left Updated: 02/14/25 1555     PTT 32 seconds     CBC and differential [[id number]]  (Abnormal) Collected: 02/14/25 1529    Lab Status: Final result Specimen: Blood from Arm, Left Updated: 02/14/25 1547     WBC 14.52 Thousand/uL      RBC 2.93 Million/uL      Hemoglobin 9.2 g/dL      Hematocrit 28.5 %      MCV 97 fL      MCH 31.4 pg      MCHC 32.3 g/dL      RDW 14.6 %      MPV 10.6 fL      Platelets 170 Thousands/uL      nRBC 0 /100 WBCs      Segmented % 80 %      Immature Grans % 1 %      Lymphocytes % 7 %      Monocytes % 11 %      Eosinophils Relative 1 %      Basophils Relative 0 %      Absolute Neutrophils 11.76 Thousands/µL      Absolute Immature Grans 0.07 Thousand/uL      Absolute Lymphocytes 0.94 Thousands/µL      Absolute Monocytes 1.65 Thousand/µL      Eosinophils Absolute 0.07 Thousand/µL      Basophils Absolute 0.03  Thousands/µL             XR femur 2 vw left   Final Interpretation by Tru Greene MD (02/15 1026)      Fluoroscopy provided for procedure guidance.      Please refer to the separate procedure note for additional details.                  Workstation performed: [id number]         XR chest portable   Final Interpretation by Alfie Borden MD (02/14 4277)      Mildly enlarged cardiomediastinal silhouette. Hypoinflation limiting motion. Bilateral perihilar hazy interstitial pulmonary infiltrates noted with lower lobe predominance. Findings may reflect atypical infectious/inflammatory process of the lung    parenchyma or interstitial pulmonary edema, recommend clinical correlation.            Workstation performed: [id number]         XR femur 2 vw left   Final Interpretation by Silvino Vicente MD (02/14 1607)      Comminuted angulated intertrochanteric femoral fracture         Computerized Assisted Algorithm (CAA) may have been used to analyze all applicable images.         Workstation performed: [id number]         CT head wo contrast   Final Interpretation by Alfie Yan MD (02/14 1402)      No acute intracranial abnormalities or significant change from priors.                        Resident: KENIA RHODES I, the attending radiologist, have reviewed the images and agree with the final report above.      Workstation performed: [id number]         XR hip/pelv 2-3 vws left if performed   Final Interpretation by Silvino Vicente MD (02/14 1166)      Acute comminuted intertrochanteric left femoral fracture         Computerized Assisted Algorithm (CAA) may have been used to analyze all applicable images.            Workstation performed: [id number]             Complex Venous Access Line    Date/Time: 2/14/2025 3:03 PM    Performed by: Sandra Chairez DO  Authorized by: Sandra Chairez DO    Patient location:  ED  Other Assisting Provider: No    Consent:     Consent obtained:   Verbal    Consent given by:  Patient  Procedure details:     Complex Venous Access Line Type: US Guided Peripheral IV      Orientation:  Left    Location:  Arm    Catheter size:  18 gauge    Approach: percutaneous technique used      Patient position:  Flat    Sterile ultrasound techniques: Sterile gel and sterile probe covers were used      Number of attempts:  1    Successful placement: yes      Landmarks identified: yes    Anesthesia (see MAR for exact dosages):     Anesthesia method:  None  Post-procedure details:     Post-procedure:  Dressing applied    Patient tolerance of procedure:  Tolerated well, no immediate complications      ED Medication and Procedure Management   Prior to Admission Medications   Prescriptions Last Dose Informant Patient Reported? Taking?   Continuous Blood Gluc  (Dexcom G7 ) CYN  Child No No   Sig: Use 1 each continuous   Continuous Glucose Sensor (Dexcom G7 Sensor)  Child No No   Sig: Use 1 Device every 10 days   Insulin Pen Needle (BD Pen Needle Josie 2nd Gen) 32G X 4 MM MISC  Child No No   Sig: FOR USE WITH INSULIN PEN. PHARMACY MAY DISPENSE BRAND COVERED BY INSURANCE.   Insulin Pen Needle (BD Pen Needle Josie 2nd Gen) 32G X 4 MM MISC  Child No No   Sig: For use with insulin pen 4 times daily. Pharmacy may dispense brand covered by insurance.   Magnesium Oxide 400 MG CAPS  Child No No   Sig: Take 1 tablet (400 mg total) by mouth 2 (two) times a day   albuterol (2.5 mg/3 mL) 0.083 % nebulizer solution  Child No No   Sig: Take 3 mL (2.5 mg total) by nebulization every 6 (six) hours as needed for wheezing or shortness of breath   Patient not taking: Reported on 2/7/2025   albuterol (PROVENTIL HFA,VENTOLIN HFA) 90 mcg/act inhaler  Child No No   Sig: Inhale 2 puffs every 4 (four) hours as needed for wheezing   apixaban (Eliquis) 5 mg  Child No No   Sig: Take 1 tablet (5 mg total) by mouth 2 (two) times a day   aspirin 81 mg chewable tablet  Child Yes No   Sig: Chew 81 mg  daily   atorvastatin (LIPITOR) 10 mg tablet  Child Yes No   Sig: Take 1 tablet by mouth daily   cyanocobalamin (VITAMIN B-12) 100 MCG tablet  Child No No   Sig: Take 1 tablet (100 mcg total) by mouth daily Do not start before November 15, 2023.   Patient not taking: Reported on 2/7/2025   ergocalciferol (VITAMIN D2) 50,000 units  Child No No   Sig: Take 1 capsule (50,000 Units total) by mouth once a week   Patient not taking: Reported on 2/7/2025   ferrous sulfate 324 (65 Fe) mg  Child No No   Sig: Take 1 tablet (324 mg total) by mouth daily before breakfast   Patient not taking: Reported on 2/7/2025   fludrocortisone (FLORINEF) 0.1 mg tablet  Child No No   Sig: TAKE 2 TABLETS BY MOUTH DAILY.   furosemide (LASIX) 20 mg tablet  Child No No   Sig: Take 1 tablet (20 mg total) by mouth daily   hydrocortisone (CORTEF) 5 mg tablet  Child No No   Sig: USE 4 TABLETS IN MORNING AND 3 TABLETS DAILY AFTERNOON   insulin glargine (Toujeo Max SoloStar) 300 units/mL CONCENTRATED U-300 injection pen (2-unit dial)  Child Yes No   Sig: Inject 18 Units under the skin daily at bedtime   insulin lispro (HumALOG/ADMELOG) 100 units/mL injection  Child Yes No   Sig: Inject 6 Units under the skin 3 (three) times a day with meals   midodrine (PROAMATINE) 5 mg tablet  Child No No   Sig: Take 1 tablet (5 mg total) by mouth 3 (three) times a day before meals   Patient taking differently: Take 5 mg by mouth if needed   potassium chloride (Klor-Con) 10 mEq tablet  Child No No   Sig: Take 1 tablet (10 mEq total) by mouth in the morning   sertraline (ZOLOFT) 100 mg tablet  Child Yes No   Sig: Take 100 mg by mouth daily      Facility-Administered Medications: None     Current Discharge Medication List        CONTINUE these medications which have NOT CHANGED    Details   albuterol (2.5 mg/3 mL) 0.083 % nebulizer solution Take 3 mL (2.5 mg total) by nebulization every 6 (six) hours as needed for wheezing or shortness of breath    Associated Diagnoses:  Chronic diastolic heart failure (HCC)      albuterol (PROVENTIL HFA,VENTOLIN HFA) 90 mcg/act inhaler Inhale 2 puffs every 4 (four) hours as needed for wheezing    Comments: Substitution to a formulary equivalent within the same pharmaceutical class is authorized.  Associated Diagnoses: Chronic diastolic heart failure (HCC)      apixaban (Eliquis) 5 mg Take 1 tablet (5 mg total) by mouth 2 (two) times a day  Qty: 60 tablet, Refills: 3    Comments: DX Code Needed  .  Associated Diagnoses: Type 2 MI (myocardial infarction) ([facility])      aspirin 81 mg chewable tablet Chew 81 mg daily      atorvastatin (LIPITOR) 10 mg tablet Take 1 tablet by mouth daily      Continuous Blood Gluc  (Dexcom G7 ) CYN Use 1 each continuous  Qty: 1 each, Refills: 0    Associated Diagnoses: Type 1 diabetes mellitus with hypoglycemia and without coma ([facility])      Continuous Glucose Sensor (Dexcom G7 Sensor) Use 1 Device every 10 days  Qty: 9 each, Refills: 1    Associated Diagnoses: Type 1 diabetes mellitus with hypoglycemia and without coma ([facility])      cyanocobalamin (VITAMIN B-12) 100 MCG tablet Take 1 tablet (100 mcg total) by mouth daily Do not start before November 15, 2023.  Qty: 30 tablet, Refills: 0    Associated Diagnoses: Vitamin B 12 deficiency      ergocalciferol (VITAMIN D2) 50,000 units Take 1 capsule (50,000 Units total) by mouth once a week  Qty: 12 capsule, Refills: 0    Associated Diagnoses: Vitamin D deficiency      ferrous sulfate 324 (65 Fe) mg Take 1 tablet (324 mg total) by mouth daily before breakfast  Qty: 90 tablet, Refills: 2    Associated Diagnoses: Iron deficiency anemia, unspecified      fludrocortisone (FLORINEF) 0.1 mg tablet TAKE 2 TABLETS BY MOUTH DAILY.  Qty: 60 tablet, Refills: 0    Associated Diagnoses: Adrenal insufficiency ([facility])      furosemide (LASIX) 20 mg tablet Take 1 tablet (20 mg total) by mouth daily  Qty: 30 tablet, Refills: 2    Associated Diagnoses: Chronic diastolic heart failure  (HCC)      hydrocortisone (CORTEF) 5 mg tablet USE 4 TABLETS IN MORNING AND 3 TABLETS DAILY AFTERNOON  Qty: 210 tablet, Refills: 6    Associated Diagnoses: Adrenal insufficiency (HCC)      insulin glargine (Toujeo Max SoloStar) 300 units/mL CONCENTRATED U-300 injection pen (2-unit dial) Inject 18 Units under the skin daily at bedtime      insulin lispro (HumALOG/ADMELOG) 100 units/mL injection Inject 6 Units under the skin 3 (three) times a day with meals    Comments: Use vials  Associated Diagnoses: Chronic diastolic heart failure (HCC)      !! Insulin Pen Needle (BD Pen Needle Josie 2nd Gen) 32G X 4 MM MISC FOR USE WITH INSULIN PEN. PHARMACY MAY DISPENSE BRAND COVERED BY INSURANCE.  Qty: 100 each, Refills: 5    Comments: DX Code Needed  .  Associated Diagnoses: Insulin dependent type 1 diabetes mellitus (HCC)      !! Insulin Pen Needle (BD Pen Needle Josie 2nd Gen) 32G X 4 MM MISC For use with insulin pen 4 times daily. Pharmacy may dispense brand covered by insurance.  Qty: 200 each, Refills: 3    Associated Diagnoses: Insulin dependent type 1 diabetes mellitus (HCC)      Magnesium Oxide 400 MG CAPS Take 1 tablet (400 mg total) by mouth 2 (two) times a day  Qty: 180 tablet, Refills: 3    Associated Diagnoses: Hypokalemia      midodrine (PROAMATINE) 5 mg tablet Take 1 tablet (5 mg total) by mouth 3 (three) times a day before meals    Associated Diagnoses: Type 1 diabetes mellitus with hyperglycemia (HCC)      potassium chloride (Klor-Con) 10 mEq tablet Take 1 tablet (10 mEq total) by mouth in the morning  Qty: 30 tablet, Refills: 0    Associated Diagnoses: Chronic diastolic heart failure (HCC)      sertraline (ZOLOFT) 100 mg tablet Take 100 mg by mouth daily       !! - Potential duplicate medications found. Please discuss with provider.        No discharge procedures on file.  ED SEPSIS DOCUMENTATION   Time reflects when diagnosis was documented in both MDM as applicable and the Disposition within this note        Time User Action Codes Description Comment    2/14/2025  1:24 PM Sandra Chairez [S72.002A] Closed fracture of left hip, initial encounter ([facility])     2/14/2025  5:28 PM Sandra Chairez [E87.6] Hypokalemia                  Sandra Chairez DO  02/15/25 1704

## 2025-02-14 NOTE — ED NOTES
Patient incontinent of stool, verbalized that soiled underwear ok to be disposed.     Meaghan Atwood RN  02/14/25 9293

## 2025-02-14 NOTE — CONSULTS
Consultation - Orthopedics   Name: Karson You 69 y.o. male I MRN: [id number]  Unit/Bed#: Q[facility] I Date of Admission: 2/14/2025   Date of Service: 2/14/2025 I Hospital Day: 0   Inpatient consult to Orthopedic Surgery  Consult performed by: Elliott Torres MD  Consult ordered by: Hugh Dunbar DO        Physician Requesting Evaluation: Hugh Dunbar DO   Reason for Evaluation / Principal Problem: left hip fracture      Assessment & Plan  Closed comminuted intertrochanteric fracture of left femur (HCC)  Assessment:  69 y.o.male with left femur intertrochanteric fracture. He is indicated for a left cephalomedullary nail.    NWB LLE  Plan for OR  NPO now  Preop labs drawn  PT/OT  Pain control  DVT ppx: per primary team  Medical management per primary team            History of Present Illness   HPI: Karson You is a 69-year-old male walker assisted ambulator who presents after a fall from standing height earlier today for who we are consulted due to evaluation for left hip fracture. Negative head strike, negative loss of consciousness.  Patient has medical history significant for stroke 2 years ago, atrial fibrillation on Eliquis, MI, and diabetes. Past surgical history non-contributory.        Review of Systems significant for findings described in the HPI.  Historical Information   Past Medical History:   Diagnosis Date    A-fib (HCC)     Adrenal insufficiency (HCC)     Atrial fibrillation (HCC)     Diabetes mellitus (HCC)     History of stroke 10/19/2022    Obesity, morbid (HCC) 11/14/2022    Shock (HCC) 12/14/2023    Stroke (HCC)     Type 2 MI (myocardial infarction) (HCC) 11/03/2022     History reviewed. No pertinent surgical history.  Social History     Tobacco Use    Smoking status: Former     Current packs/day: 0.25     Average packs/day: 0.3 packs/day for 30.0 years (7.5 ttl pk-yrs)     Types: Cigarettes    Smokeless tobacco: Never   Vaping Use    Vaping status: Never Used   Substance and Sexual  "Activity    Alcohol use: Not Currently     Alcohol/week: 5.0 standard drinks of alcohol     Types: 5 Cans of beer per week     Comment: Quit in august 2022    Drug use: Never    Sexual activity: Not Currently     E-Cigarette/Vaping    E-Cigarette Use Never User      E-Cigarette/Vaping Substances    Nicotine No     THC No     CBD No     Flavoring No     Other No     Unknown No          Objective :  Temp:  [98.9 °F (37.2 °C)] 98.9 °F (37.2 °C)  HR:  [83] 83  BP: (148)/(80) 148/80  Resp:  [18] 18  SpO2:  [99 %] 99 %  O2 Device: None (Room air)  Physical ExamOrtho Exam   /80   Pulse 83   Temp 98.9 °F (37.2 °C) (Oral)   Resp 18   SpO2 99%   Gen: No acute distress, resting comfortably in bed  HEENT: Eyes clear, moist mucus membranes, hearing intact  Respiratory: No audible wheezing or stridor  Cardiovascular: Well Perfused peripherally, 2+ distal pulse  Abdomen: nondistended, no peritoneal signs  Musculoskeletal: left lower extremity  Skin intact, limb shortened and externally rotated   There is no tenderness to palpation over the left hip, however there is pain with logroll.  Sensation intact to sural, saphenous, superficial peroneal, deep peroneal, tibial distributions.  Motor intact ankle plantarflexion/dorsiflexion, EHL/FHL.  Extremity is warm and well-perfused with capillary refill less than 2 seconds.  Noted on the right foot are several amputations of several toes.    Radiology:   I personally reviewed the films.  X-ray of the left hip demonstrates an intertrochanteric fracture of the left femur.        Lab Results: I have reviewed the following results:   No results for input(s): \"WBC\", \"HGB\", \"HCT\", \"PLT\", \"BANDSPCT\", \"BUN\", \"CREATININE\", \"PTT\", \"INR\", \"ESR\", \"CRP\" in the last 72 hours.  Blood Culture:   Lab Results   Component Value Date    BLOODCX Staphylococcus epidermidis (A) 01/07/2025     Wound Culture: No results found for: \"WOUNDCULT\"        "

## 2025-02-14 NOTE — QUICK NOTE
Orthopedics PreOp Note  Karson You 69 y.o. male MRN: [id number]  Unit/Bed#: X ray      Dx: Left intertrochanteric femur fracture  Procedure: Left INSERTION NAIL IM FEMUR ANTEGRADE (TROCHANTERIC)    Lab Results   Component Value Date    HGB 9.2 (L) 02/14/2025     02/14/2025    WBC 14.52 (H) 02/14/2025       Lab Results   Component Value Date    SODIUM 141 02/14/2025    K 2.4 (LL) 02/14/2025     02/14/2025    CO2 31 02/14/2025    BUN 15 02/14/2025    CREATININE 0.93 02/14/2025    GLUC 114 02/14/2025    CALCIUM 7.8 (L) 02/14/2025       Lab Results   Component Value Date    PTT 32 02/14/2025    INR 1.39 (H) 02/14/2025    PROTIME 17.3 (H) 02/14/2025       CXR: in chart  EKG: in chart    Abx: preop ancef 2g ordered on call to OR  Type and Screen/Cross: obtained within past 72hrs, 2 units RBCs ordered for OR  NPO: at midnight  Consent: Acquired, will be scanned into chart in preop  POA Name/ Phone: See consent form  Clearance: cleared by the primary team  Chemical DVT prophylaxis: may be given unless otherwise communicated (see below). Please reach out to the [facility] Ortho floor role with any questions.      Cases that WILL operate with preop Lovenox/SQH:  Intertrochanteric fractures (TFNs)  Tibia fractures  Mid shaft femur fractures  Most ankle fractures (single or bimalleolar)  Distal radius fractures  Both bone forearm fractures     Cases that MAY HOLD preop Lovenox/SQH:  Larger and longer cases  Complex acetabular fractures, complex periarticular fractures (tibia plateaus, complex elbow, periprosthetic fractures)

## 2025-02-15 ENCOUNTER — APPOINTMENT (INPATIENT)
Dept: RADIOLOGY | Facility: HOSPITAL | Age: 70
DRG: 481 | End: 2025-02-15
Payer: COMMERCIAL

## 2025-02-15 PROBLEM — I50.9 CHF (CONGESTIVE HEART FAILURE) (HCC): Status: ACTIVE | Noted: 2025-02-15

## 2025-02-15 LAB
ANION GAP SERPL CALCULATED.3IONS-SCNC: 7 MMOL/L (ref 4–13)
ANION GAP SERPL CALCULATED.3IONS-SCNC: 8 MMOL/L (ref 4–13)
ATRIAL RATE: 197 BPM
BASOPHILS # BLD AUTO: 0.02 THOUSANDS/ΜL (ref 0–0.1)
BASOPHILS NFR BLD AUTO: 0 % (ref 0–1)
BNP SERPL-MCNC: 360 PG/ML (ref 0–100)
BUN SERPL-MCNC: 15 MG/DL (ref 5–25)
BUN SERPL-MCNC: 16 MG/DL (ref 5–25)
CALCIUM SERPL-MCNC: 7.7 MG/DL (ref 8.4–10.2)
CALCIUM SERPL-MCNC: 7.8 MG/DL (ref 8.4–10.2)
CARDIAC TROPONIN I PNL SERPL HS: 37 NG/L (ref 8–18)
CHLORIDE SERPL-SCNC: 103 MMOL/L (ref 96–108)
CHLORIDE SERPL-SCNC: 105 MMOL/L (ref 96–108)
CO2 SERPL-SCNC: 29 MMOL/L (ref 21–32)
CO2 SERPL-SCNC: 33 MMOL/L (ref 21–32)
CREAT SERPL-MCNC: 0.85 MG/DL (ref 0.6–1.3)
CREAT SERPL-MCNC: 0.86 MG/DL (ref 0.6–1.3)
EOSINOPHIL # BLD AUTO: 0.12 THOUSAND/ΜL (ref 0–0.61)
EOSINOPHIL NFR BLD AUTO: 1 % (ref 0–6)
ERYTHROCYTE [DISTWIDTH] IN BLOOD BY AUTOMATED COUNT: 14.7 % (ref 11.6–15.1)
GFR SERPL CREATININE-BSD FRML MDRD: 89 ML/MIN/1.73SQ M
GFR SERPL CREATININE-BSD FRML MDRD: 90 ML/MIN/1.73SQ M
GLUCOSE SERPL-MCNC: 137 MG/DL (ref 65–140)
GLUCOSE SERPL-MCNC: 141 MG/DL (ref 65–140)
GLUCOSE SERPL-MCNC: 146 MG/DL (ref 65–140)
GLUCOSE SERPL-MCNC: 152 MG/DL (ref 65–140)
GLUCOSE SERPL-MCNC: 154 MG/DL (ref 65–140)
GLUCOSE SERPL-MCNC: 183 MG/DL (ref 65–140)
GLUCOSE SERPL-MCNC: 271 MG/DL (ref 65–140)
GLUCOSE SERPL-MCNC: 312 MG/DL (ref 65–140)
HCT VFR BLD AUTO: 29.1 % (ref 36.5–49.3)
HGB BLD-MCNC: 9.1 G/DL (ref 12–17)
IMM GRANULOCYTES # BLD AUTO: 0.06 THOUSAND/UL (ref 0–0.2)
IMM GRANULOCYTES NFR BLD AUTO: 1 % (ref 0–2)
LYMPHOCYTES # BLD AUTO: 2.3 THOUSANDS/ΜL (ref 0.6–4.47)
LYMPHOCYTES NFR BLD AUTO: 20 % (ref 14–44)
MAGNESIUM SERPL-MCNC: 2.2 MG/DL (ref 1.9–2.7)
MAGNESIUM SERPL-MCNC: 2.3 MG/DL (ref 1.9–2.7)
MCH RBC QN AUTO: 31.3 PG (ref 26.8–34.3)
MCHC RBC AUTO-ENTMCNC: 31.3 G/DL (ref 31.4–37.4)
MCV RBC AUTO: 100 FL (ref 82–98)
MONOCYTES # BLD AUTO: 1.27 THOUSAND/ΜL (ref 0.17–1.22)
MONOCYTES NFR BLD AUTO: 11 % (ref 4–12)
NEUTROPHILS # BLD AUTO: 7.87 THOUSANDS/ΜL (ref 1.85–7.62)
NEUTS SEG NFR BLD AUTO: 67 % (ref 43–75)
NRBC BLD AUTO-RTO: 0 /100 WBCS
PLATELET # BLD AUTO: 162 THOUSANDS/UL (ref 149–390)
PMV BLD AUTO: 10.5 FL (ref 8.9–12.7)
POTASSIUM SERPL-SCNC: 3.2 MMOL/L (ref 3.5–5.3)
POTASSIUM SERPL-SCNC: 3.4 MMOL/L (ref 3.5–5.3)
QRS AXIS: 35 DEGREES
QRSD INTERVAL: 72 MS
QT INTERVAL: 548 MS
QTC INTERVAL: 647 MS
RBC # BLD AUTO: 2.91 MILLION/UL (ref 3.88–5.62)
SODIUM SERPL-SCNC: 142 MMOL/L (ref 135–147)
SODIUM SERPL-SCNC: 143 MMOL/L (ref 135–147)
T WAVE AXIS: 57 DEGREES
VENTRICULAR RATE: 84 BPM
WBC # BLD AUTO: 11.64 THOUSAND/UL (ref 4.31–10.16)

## 2025-02-15 PROCEDURE — NC001 PR NO CHARGE: Performed by: ORTHOPAEDIC SURGERY

## 2025-02-15 PROCEDURE — C1769 GUIDE WIRE: HCPCS | Performed by: ORTHOPAEDIC SURGERY

## 2025-02-15 PROCEDURE — 85025 COMPLETE CBC W/AUTO DIFF WBC: CPT

## 2025-02-15 PROCEDURE — 83735 ASSAY OF MAGNESIUM: CPT

## 2025-02-15 PROCEDURE — C1713 ANCHOR/SCREW BN/BN,TIS/BN: HCPCS | Performed by: ORTHOPAEDIC SURGERY

## 2025-02-15 PROCEDURE — 73552 X-RAY EXAM OF FEMUR 2/>: CPT

## 2025-02-15 PROCEDURE — 83735 ASSAY OF MAGNESIUM: CPT | Performed by: INTERNAL MEDICINE

## 2025-02-15 PROCEDURE — 0QS704Z REPOSITION LEFT UPPER FEMUR WITH INTERNAL FIXATION DEVICE, OPEN APPROACH: ICD-10-PCS | Performed by: ORTHOPAEDIC SURGERY

## 2025-02-15 PROCEDURE — 84484 ASSAY OF TROPONIN QUANT: CPT

## 2025-02-15 PROCEDURE — 27245 TREAT THIGH FRACTURE: CPT | Performed by: ORTHOPAEDIC SURGERY

## 2025-02-15 PROCEDURE — 82948 REAGENT STRIP/BLOOD GLUCOSE: CPT

## 2025-02-15 PROCEDURE — 80048 BASIC METABOLIC PNL TOTAL CA: CPT | Performed by: INTERNAL MEDICINE

## 2025-02-15 PROCEDURE — 93010 ELECTROCARDIOGRAM REPORT: CPT | Performed by: INTERNAL MEDICINE

## 2025-02-15 PROCEDURE — 80048 BASIC METABOLIC PNL TOTAL CA: CPT

## 2025-02-15 DEVICE — 11MM/130 DEG TI CANN TFNA 360MM/LEFT - STERILE
Type: IMPLANTABLE DEVICE | Site: FEMUR | Status: FUNCTIONAL
Brand: TFN-ADVANCE

## 2025-02-15 DEVICE — LOCKING SCREW FOR IM NAIL Ø 5MM/ 44MM/ XL25/ STERILE: Type: IMPLANTABLE DEVICE | Site: FEMUR | Status: FUNCTIONAL

## 2025-02-15 DEVICE — TFNA FENESTRATED SCREW 105MM - STERILE
Type: IMPLANTABLE DEVICE | Site: FEMUR | Status: FUNCTIONAL
Brand: TFN-ADVANCE

## 2025-02-15 DEVICE — LOCKING SCREW FOR IM NAIL Ø 5MM/ 40MM/ XL25/ STERILE: Type: IMPLANTABLE DEVICE | Site: FEMUR | Status: FUNCTIONAL

## 2025-02-15 RX ORDER — INSULIN LISPRO 100 [IU]/ML
1-5 INJECTION, SOLUTION INTRAVENOUS; SUBCUTANEOUS
Status: DISCONTINUED | OUTPATIENT
Start: 2025-02-15 | End: 2025-02-18

## 2025-02-15 RX ORDER — CLINDAMYCIN PHOSPHATE 900 MG/50ML
900 INJECTION, SOLUTION INTRAVENOUS ONCE
Status: COMPLETED | OUTPATIENT
Start: 2025-02-15 | End: 2025-02-15

## 2025-02-15 RX ORDER — INSULIN LISPRO 100 [IU]/ML
2 INJECTION, SOLUTION INTRAVENOUS; SUBCUTANEOUS ONCE
Status: COMPLETED | OUTPATIENT
Start: 2025-02-15 | End: 2025-02-15

## 2025-02-15 RX ORDER — VANCOMYCIN HYDROCHLORIDE 1 G/200ML
12.5 INJECTION, SOLUTION INTRAVENOUS EVERY 8 HOURS
Status: COMPLETED | OUTPATIENT
Start: 2025-02-15 | End: 2025-02-16

## 2025-02-15 RX ORDER — MAGNESIUM HYDROXIDE 1200 MG/15ML
LIQUID ORAL AS NEEDED
Status: DISCONTINUED | OUTPATIENT
Start: 2025-02-15 | End: 2025-02-15 | Stop reason: HOSPADM

## 2025-02-15 RX ORDER — LIDOCAINE HYDROCHLORIDE 10 MG/ML
INJECTION, SOLUTION EPIDURAL; INFILTRATION; INTRACAUDAL; PERINEURAL AS NEEDED
Status: DISCONTINUED | OUTPATIENT
Start: 2025-02-15 | End: 2025-02-15

## 2025-02-15 RX ORDER — FENTANYL CITRATE 50 UG/ML
INJECTION, SOLUTION INTRAMUSCULAR; INTRAVENOUS AS NEEDED
Status: DISCONTINUED | OUTPATIENT
Start: 2025-02-15 | End: 2025-02-15

## 2025-02-15 RX ORDER — KETAMINE HCL IN NACL, ISO-OSM 100MG/10ML
SYRINGE (ML) INJECTION AS NEEDED
Status: DISCONTINUED | OUTPATIENT
Start: 2025-02-15 | End: 2025-02-15

## 2025-02-15 RX ORDER — ONDANSETRON 2 MG/ML
4 INJECTION INTRAMUSCULAR; INTRAVENOUS ONCE AS NEEDED
Status: CANCELLED | OUTPATIENT
Start: 2025-02-15

## 2025-02-15 RX ORDER — TRANEXAMIC ACID 10 MG/ML
INJECTION, SOLUTION INTRAVENOUS AS NEEDED
Status: DISCONTINUED | OUTPATIENT
Start: 2025-02-15 | End: 2025-02-15

## 2025-02-15 RX ORDER — LIDOCAINE HYDROCHLORIDE 10 MG/ML
0.5 INJECTION, SOLUTION EPIDURAL; INFILTRATION; INTRACAUDAL; PERINEURAL ONCE AS NEEDED
Status: CANCELLED | OUTPATIENT
Start: 2025-02-15

## 2025-02-15 RX ORDER — FENTANYL CITRATE/PF 50 MCG/ML
25 SYRINGE (ML) INJECTION
Status: DISCONTINUED | OUTPATIENT
Start: 2025-02-15 | End: 2025-02-15 | Stop reason: HOSPADM

## 2025-02-15 RX ORDER — METOCLOPRAMIDE HYDROCHLORIDE 5 MG/ML
INJECTION INTRAMUSCULAR; INTRAVENOUS AS NEEDED
Status: DISCONTINUED | OUTPATIENT
Start: 2025-02-15 | End: 2025-02-15

## 2025-02-15 RX ORDER — HYDROCORTISONE SODIUM SUCCINATE 100 MG/2ML
INJECTION INTRAMUSCULAR; INTRAVENOUS AS NEEDED
Status: DISCONTINUED | OUTPATIENT
Start: 2025-02-15 | End: 2025-02-15

## 2025-02-15 RX ORDER — SODIUM CHLORIDE, SODIUM LACTATE, POTASSIUM CHLORIDE, CALCIUM CHLORIDE 600; 310; 30; 20 MG/100ML; MG/100ML; MG/100ML; MG/100ML
INJECTION, SOLUTION INTRAVENOUS CONTINUOUS PRN
Status: DISCONTINUED | OUTPATIENT
Start: 2025-02-15 | End: 2025-02-15

## 2025-02-15 RX ORDER — PROPOFOL 10 MG/ML
INJECTION, EMULSION INTRAVENOUS AS NEEDED
Status: DISCONTINUED | OUTPATIENT
Start: 2025-02-15 | End: 2025-02-15

## 2025-02-15 RX ORDER — MIDAZOLAM HYDROCHLORIDE 2 MG/2ML
INJECTION, SOLUTION INTRAMUSCULAR; INTRAVENOUS AS NEEDED
Status: DISCONTINUED | OUTPATIENT
Start: 2025-02-15 | End: 2025-02-15

## 2025-02-15 RX ORDER — SODIUM CHLORIDE, SODIUM LACTATE, POTASSIUM CHLORIDE, CALCIUM CHLORIDE 600; 310; 30; 20 MG/100ML; MG/100ML; MG/100ML; MG/100ML
125 INJECTION, SOLUTION INTRAVENOUS CONTINUOUS
Status: CANCELLED | OUTPATIENT
Start: 2025-02-15

## 2025-02-15 RX ORDER — POTASSIUM CHLORIDE 14.9 MG/ML
20 INJECTION INTRAVENOUS ONCE
Status: COMPLETED | OUTPATIENT
Start: 2025-02-15 | End: 2025-02-15

## 2025-02-15 RX ORDER — ROCURONIUM BROMIDE 10 MG/ML
INJECTION, SOLUTION INTRAVENOUS AS NEEDED
Status: DISCONTINUED | OUTPATIENT
Start: 2025-02-15 | End: 2025-02-15

## 2025-02-15 RX ORDER — SUCCINYLCHOLINE/SOD CL,ISO/PF 100 MG/5ML
SYRINGE (ML) INTRAVENOUS AS NEEDED
Status: DISCONTINUED | OUTPATIENT
Start: 2025-02-15 | End: 2025-02-15

## 2025-02-15 RX ORDER — SODIUM CHLORIDE, SODIUM GLUCONATE, SODIUM ACETATE, POTASSIUM CHLORIDE, MAGNESIUM CHLORIDE, SODIUM PHOSPHATE, DIBASIC, AND POTASSIUM PHOSPHATE .53; .5; .37; .037; .03; .012; .00082 G/100ML; G/100ML; G/100ML; G/100ML; G/100ML; G/100ML; G/100ML
500 INJECTION, SOLUTION INTRAVENOUS ONCE
Status: COMPLETED | OUTPATIENT
Start: 2025-02-15 | End: 2025-02-15

## 2025-02-15 RX ORDER — HYDROMORPHONE HCL IN WATER/PF 6 MG/30 ML
0.2 PATIENT CONTROLLED ANALGESIA SYRINGE INTRAVENOUS
Status: DISCONTINUED | OUTPATIENT
Start: 2025-02-15 | End: 2025-02-15 | Stop reason: HOSPADM

## 2025-02-15 RX ADMIN — VANCOMYCIN HYDROCHLORIDE 1000 MG: 1 INJECTION, SOLUTION INTRAVENOUS at 23:34

## 2025-02-15 RX ADMIN — ROCURONIUM BROMIDE 50 MG: 10 INJECTION, SOLUTION INTRAVENOUS at 07:42

## 2025-02-15 RX ADMIN — APIXABAN 5 MG: 5 TABLET, FILM COATED ORAL at 12:13

## 2025-02-15 RX ADMIN — Medication 100 MG: at 07:33

## 2025-02-15 RX ADMIN — SODIUM CHLORIDE, SODIUM GLUCONATE, SODIUM ACETATE, POTASSIUM CHLORIDE, MAGNESIUM CHLORIDE, SODIUM PHOSPHATE, DIBASIC, AND POTASSIUM PHOSPHATE 500 ML: .53; .5; .37; .037; .03; .012; .00082 INJECTION, SOLUTION INTRAVENOUS at 14:41

## 2025-02-15 RX ADMIN — INSULIN LISPRO 2 UNITS: 100 INJECTION, SOLUTION INTRAVENOUS; SUBCUTANEOUS at 17:39

## 2025-02-15 RX ADMIN — PROPOFOL 150 MG: 10 INJECTION, EMULSION INTRAVENOUS at 07:33

## 2025-02-15 RX ADMIN — LIDOCAINE HYDROCHLORIDE 50 MG: 10 INJECTION, SOLUTION EPIDURAL; INFILTRATION; INTRACAUDAL; PERINEURAL at 07:33

## 2025-02-15 RX ADMIN — Medication 20 MG: at 07:33

## 2025-02-15 RX ADMIN — HYDROCORTISONE SODIUM SUCCINATE 50 MG: 100 INJECTION, POWDER, FOR SOLUTION INTRAMUSCULAR; INTRAVENOUS at 07:38

## 2025-02-15 RX ADMIN — METOCLOPRAMIDE 5 MG: 5 INJECTION, SOLUTION INTRAMUSCULAR; INTRAVENOUS at 07:56

## 2025-02-15 RX ADMIN — CLINDAMYCIN PHOSPHATE 900 MG: 900 INJECTION, SOLUTION INTRAVENOUS at 07:42

## 2025-02-15 RX ADMIN — ACETAMINOPHEN 975 MG: 325 TABLET, FILM COATED ORAL at 14:37

## 2025-02-15 RX ADMIN — INSULIN LISPRO 2 UNITS: 100 INJECTION, SOLUTION INTRAVENOUS; SUBCUTANEOUS at 22:49

## 2025-02-15 RX ADMIN — ASPIRIN 81 MG CHEWABLE TABLET 81 MG: 81 TABLET CHEWABLE at 12:13

## 2025-02-15 RX ADMIN — FENTANYL CITRATE 50 MCG: 50 INJECTION INTRAMUSCULAR; INTRAVENOUS at 07:33

## 2025-02-15 RX ADMIN — VANCOMYCIN HYDROCHLORIDE 1000 MG: 1 INJECTION, SOLUTION INTRAVENOUS at 16:23

## 2025-02-15 RX ADMIN — MIDAZOLAM 1 MG: 1 INJECTION INTRAMUSCULAR; INTRAVENOUS at 07:33

## 2025-02-15 RX ADMIN — FLUDROCORTISONE ACETATE 0.2 MG: 0.1 TABLET ORAL at 12:17

## 2025-02-15 RX ADMIN — INSULIN LISPRO 1 UNITS: 100 INJECTION, SOLUTION INTRAVENOUS; SUBCUTANEOUS at 12:25

## 2025-02-15 RX ADMIN — FENTANYL CITRATE 50 MCG: 50 INJECTION INTRAMUSCULAR; INTRAVENOUS at 09:12

## 2025-02-15 RX ADMIN — ACETAMINOPHEN 975 MG: 325 TABLET, FILM COATED ORAL at 22:48

## 2025-02-15 RX ADMIN — TRANEXAMIC ACID 1000 MG: 10 INJECTION, SOLUTION INTRAVENOUS at 08:10

## 2025-02-15 RX ADMIN — ROCURONIUM BROMIDE 10 MG: 10 INJECTION, SOLUTION INTRAVENOUS at 08:18

## 2025-02-15 RX ADMIN — ATORVASTATIN CALCIUM 10 MG: 10 TABLET, FILM COATED ORAL at 12:13

## 2025-02-15 RX ADMIN — POTASSIUM CHLORIDE 20 MEQ: 14.9 INJECTION, SOLUTION INTRAVENOUS at 02:30

## 2025-02-15 RX ADMIN — PHENYLEPHRINE HYDROCHLORIDE 40 MCG/MIN: 10 INJECTION INTRAVENOUS at 07:43

## 2025-02-15 RX ADMIN — SODIUM CHLORIDE, SODIUM LACTATE, POTASSIUM CHLORIDE, AND CALCIUM CHLORIDE: .6; .31; .03; .02 INJECTION, SOLUTION INTRAVENOUS at 07:33

## 2025-02-15 RX ADMIN — OXYCODONE HYDROCHLORIDE 5 MG: 5 TABLET ORAL at 22:48

## 2025-02-15 RX ADMIN — POTASSIUM CHLORIDE 20 MEQ: 14.9 INJECTION, SOLUTION INTRAVENOUS at 00:35

## 2025-02-15 RX ADMIN — APIXABAN 5 MG: 5 TABLET, FILM COATED ORAL at 22:48

## 2025-02-15 RX ADMIN — Medication 10 MG: at 08:17

## 2025-02-15 RX ADMIN — POTASSIUM CHLORIDE 20 MEQ: 14.9 INJECTION, SOLUTION INTRAVENOUS at 04:30

## 2025-02-15 RX ADMIN — SERTRALINE 100 MG: 100 TABLET, FILM COATED ORAL at 12:12

## 2025-02-15 RX ADMIN — SUGAMMADEX 200 MG: 100 INJECTION, SOLUTION INTRAVENOUS at 09:22

## 2025-02-15 RX ADMIN — HYDROCORTISONE 5 MG: 5 TABLET ORAL at 12:16

## 2025-02-15 NOTE — ANESTHESIA PREPROCEDURE EVALUATION
Procedure:  INSERTION NAIL IM FEMUR ANTEGRADE (TROCHANTERIC) (Left: Leg Upper)    Relevant Problems   ANESTHESIA   (-) History of anesthesia complications      CARDIO   (+) Acute on chronic heart failure with preserved ejection fraction (HFpEF) ([facility])   (+) CHF (congestive heart failure) ([facility])   (+) Mixed hyperlipidemia   (+) PAD (peripheral artery disease) ([facility])   (+) Paroxysmal A-fib ([facility])      ENDO   (+) Type 1 diabetes mellitus with hyperglycemia ([facility])      /RENAL   (+) Chronic kidney disease, stage 5 ([facility])   (+) Stage 2 chronic kidney disease      HEMATOLOGY   (+) Anemia of chronic disease   (+) Iron deficiency anemia, unspecified        Left Ventricle: Left ventricular cavity size is normal. Wall thickness is normal. The left ventricular ejection fraction is 60%. Systolic function is normal. Wall motion is normal. Diastolic function is normal for age.    Right Ventricle: Right ventricular cavity size is normal. Systolic function is normal.    Aortic Valve: There is trace regurgitation.  Physical Exam    Airway    Mallampati score: II  TM Distance: >3 FB  Neck ROM: full     Dental   Comment: Poor dentition, denies loose     Cardiovascular      Pulmonary      Other Findings        Anesthesia Plan  ASA Score- 3     Anesthesia Type- general with ASA Monitors.         Additional Monitors:     Airway Plan: ETT.           Plan Factors-    Chart reviewed. EKG reviewed.  Existing labs reviewed. Patient summary reviewed.                  Induction- intravenous.    Postoperative Plan-     Perioperative Resuscitation Plan - Level 1 - Full Code.       Informed Consent- Anesthetic plan and risks discussed with patient.  I personally reviewed this patient with the CRNA. Discussed and agreed on the Anesthesia Plan with the CRNA..      NPO Status:  Vitals Value Taken Time   Date of last liquid 02/14/25 02/15/25 0647   Time of last liquid 2230 02/15/25 0647   Date of last solid 02/14/25 02/15/25 0647   Time of last solid 2230  02/15/25 0647

## 2025-02-15 NOTE — ASSESSMENT & PLAN NOTE
Assessment:  69 y.o.male with left femur intertrochanteric fracture. He is indicated for a left cephalomedullary nail.    NWB LLE  Plan for OR today  NPO now  Preop labs drawn  PT/OT  Pain control  DVT ppx: per primary team  Medical management per primary team

## 2025-02-15 NOTE — ASSESSMENT & PLAN NOTE
Ortho consulted, appreciate recs  NWB LLE  Plan for OR tomorrow  NPO after midnight   Preop labs drawn  PT/OT as indicated  Multimodal pain regimen  DVT ppx: okay per ortho, resume home eliquis

## 2025-02-15 NOTE — PROGRESS NOTES
Progress Note /postop check- Trauma   Name: Karson You 69 y.o. male I MRN: [id number]  [facility] Date of Admission: 2/14/2025   Date of Service: 2/15/2025 I Hospital Day: 1    Assessment & Plan  Closed comminuted intertrochanteric fracture of left femur (HCC)  Ortho consulted, appreciate recs  NWB LLE  Plan for OR tomorrow  NPO after midnight   Preop labs drawn  PT/OT as indicated  Multimodal pain regimen  DVT ppx: okay per ortho, resume home eliquis  Hypokalemia  Chronic, in setting of adrenal insufficiency and loop diuretics  Initial K of 2.4  S/p repletion with IV and PO potassium  Follow up am BMP  Hypomagnesemia  Chronic  1.6 on admission   S/p 4 g IV mag  Follow up am labs  Paroxysmal A-fib (HCC)  Continue home eliquis  Adrenal insufficiency (HCC)  Continue home fludrocortisone and hydrocortisone  CHF (congestive heart failure) (HCC)  Wt Readings from Last 3 Encounters:   02/14/25 85.4 kg (188 lb 4.4 oz)   02/07/25 83.4 kg (183 lb 14.4 oz)   02/04/25 81.6 kg (180 lb)   Last echo 2023- EF 60%  Hold home lasix in setting of hypokalemia  BNP pending    VTE Prophylaxis: VTE covered by:  apixaban, Oral, 5 mg at 02/15/25 1213         Disposition: Pending PT OT eval likely subacute rehab     TRAUMA TERTIARY SURVEY  Summary of Diagnosed Injuries: Left intertrochanteric femur fracture    Mechanism of Injury:Fall     Chief Complaint: Fall with hip pain    24 Hour Events : Went to the OR, successful ORIF of his left hip,  Subjective : Patient states after surgery he feels okay.  He says he has some pain in the area but not much.  Says that he is happy that he is able to eat.  Says that otherwise he feels okay.  States that when he did fall he did not have any prodromal symptoms was strictly mechanical fall.    Objective :  Temp:  [98 °F (36.7 °C)-99.5 °F (37.5 °C)] 98 °F (36.7 °C)  HR:  [69-84] 77  BP: ()/(48-67) 116/67  Resp:  [12-22] 16  SpO2:  [92 %-100 %] 100 %  O2 Device: Nasal cannula  Nasal  Cannula O2 Flow Rate (L/min):  [2 L/min] 2 L/min    I/O         02/13 0701  02/14 0700 02/14 0701  02/15 0700 02/15 0701  02/16 0700    IV Piggyback   100    Total Intake(mL/kg)   100 (1.2)    Blood   100    Total Output   100    Net   0           Unmeasured Urine Occurrence  1 x           Lines/Drains/Airways       Active Status       Name Placement date Placement time Site Days    External Urinary Catheter Large 02/14/25  2330  -- less than 1                  Physical Exam  Vitals and nursing note reviewed.   Constitutional:       Appearance: Normal appearance. He is well-developed.   HENT:      Head: Normocephalic and atraumatic.      Nose: Nose normal.      Mouth/Throat:      Mouth: Mucous membranes are moist.      Pharynx: No oropharyngeal exudate or posterior oropharyngeal erythema.   Eyes:      Extraocular Movements: Extraocular movements intact.      Conjunctiva/sclera: Conjunctivae normal.      Pupils: Pupils are equal, round, and reactive to light.   Cardiovascular:      Rate and Rhythm: Normal rate and regular rhythm.      Pulses: Normal pulses.      Heart sounds: Normal heart sounds.   Pulmonary:      Effort: Pulmonary effort is normal.      Comments: Decreased breath sounds bilaterally in bases.  Clear otherwise.  Abdominal:      General: Bowel sounds are normal. There is no distension.      Palpations: Abdomen is soft.      Tenderness: There is no abdominal tenderness. There is no guarding or rebound.   Musculoskeletal:         General: Normal range of motion.      Cervical back: Normal range of motion and neck supple.      Right lower leg: No edema.      Left lower leg: No edema.      Comments: Old healed scars from right multiple toe amputations   Skin:     General: Skin is warm and dry.      Capillary Refill: Capillary refill takes less than 2 seconds.      Comments: Wound dressing on left hip clean dry and intact.   Neurological:      General: No focal deficit present.      Mental Status: He is  alert and oriented to person, place, and time.      Cranial Nerves: No cranial nerve deficit.      Comments: Neurovascularly intact in all 4 extremities.   Psychiatric:         Mood and Affect: Mood normal.         Behavior: Behavior normal.          1. Before the illness or injury that brought you to the Emergency, did you need someone to help you on a regular basis? 1=Yes   2. Since the illness or injury that brought you to the Emergency, have you needed more help than usual to take care of yourself? 1=Yes   3. Have you been hospitalized for one or more nights during the past 6 months (excluding a stay in the Emergency Department)? 1=Yes   4. In general, do you see well? 0=Yes   5. In general, do you have serious problems with your memory? 0=No   6. Do you take more than three different medications everyday? 1=Yes   TOTAL   4     Did you order a geriatric consult if the score was 2 or greater?: yes           Lab Results: I have reviewed the following results:  Recent Labs     02/14/25  1529 02/14/25  1705 02/15/25  0015 02/15/25  0606   WBC 14.52*  --   --  11.64*   HGB 9.2*  --   --  9.1*   HCT 28.5*  --   --  29.1*     --   --  162   SODIUM 141  --    < > 142   K 2.4*  --    < > 3.4*     --    < > 105   CO2 31  --    < > 29   BUN 15  --    < > 16   CREATININE 0.93  --    < > 0.85   GLUC 114  --    < > 141*   MG  --  1.6*   < > 2.2   PHOS  --  3.4  --   --    PTT 32  --   --   --    INR 1.39*  --   --   --    HSTNI0 55*  --   --   --    HSTNI2  --  51*  --   --    *  --   --   --     < > = values in this interval not displayed.

## 2025-02-15 NOTE — ASSESSMENT & PLAN NOTE
Chronic, in setting of adrenal insufficiency and loop diuretics  Initial K of 2.4  S/p repletion with IV and PO potassium  Follow up am BMP

## 2025-02-15 NOTE — ASSESSMENT & PLAN NOTE
Wt Readings from Last 3 Encounters:   02/14/25 85.4 kg (188 lb 4.4 oz)   02/07/25 83.4 kg (183 lb 14.4 oz)   02/04/25 81.6 kg (180 lb)   Last echo 2023- EF 60%  Hold home lasix in setting of hypokalemia  BNP pending

## 2025-02-15 NOTE — PROGRESS NOTES
"Progress Note - Orthopedics   Name: Karson You 69 y.o. male I MRN: [id number]  Unit/Bed#: [facility] 825-01 I Date of Admission: 2/14/2025   Date of Service: 2/15/2025 I Hospital Day: 1    Assessment & Plan  Closed comminuted intertrochanteric fracture of left femur (HCC)  Assessment:  69 y.o.male with left femur intertrochanteric fracture. He is indicated for a left cephalomedullary nail.    NWB LLE  Plan for OR today  NPO now  Preop labs drawn  PT/OT  Pain control  DVT ppx: per primary team  Medical management per primary team          Subjective   69 y.o.male  No acute events, no new complaints. Pain well controlled. Denies fevers, chills, CP, SOB, N/V, numbness or tingling.    Objective :  Temp:  [98.6 °F (37 °C)-99.5 °F (37.5 °C)] 98.6 °F (37 °C)  HR:  [69-84] 75  BP: (114-148)/(54-80) 116/55  Resp:  [12-20] 20  SpO2:  [92 %-99 %] 92 %  O2 Device: None (Room air)    Physical Exam  Musculoskeletal: left lower extremity  Skin intact, limb shortened and externally rotated   There is no tenderness to palpation over the left hip, however there is pain with logroll.  Sensation intact to sural, saphenous, superficial peroneal, deep peroneal, tibial distributions.  Motor intact ankle plantarflexion/dorsiflexion, EHL/FHL.  Extremity is warm and well-perfused with capillary refill less than 2 seconds.  Noted on the right foot are several amputations of several toes.      Lab Results: I have reviewed the following results:  Recent Labs     02/14/25  1529 02/15/25  0015   WBC 14.52*  --    HGB 9.2*  --    HCT 28.5*  --      --    BUN 15 15   CREATININE 0.93 0.86   PTT 32  --    INR 1.39*  --      Blood Culture:    Lab Results   Component Value Date    BLOODCX Staphylococcus epidermidis (A) 01/07/2025     Wound Culture: No results found for: \"WOUNDCULT\"        "

## 2025-02-15 NOTE — PLAN OF CARE
Problem: METABOLIC, FLUID AND ELECTROLYTES - ADULT  Goal: Electrolytes maintained within normal limits  Description: INTERVENTIONS:  - Monitor labs and assess patient for signs and symptoms of electrolyte imbalances  - Administer electrolyte replacement as ordered  - Monitor response to electrolyte replacements, including repeat lab results as appropriate  - Instruct patient on fluid and nutrition as appropriate  Outcome: Progressing  Goal: Fluid balance maintained  Description: INTERVENTIONS:  - Monitor labs   - Monitor I/O and WT  - Instruct patient on fluid and nutrition as appropriate  - Assess for signs & symptoms of volume excess or deficit  Outcome: Progressing  Goal: Glucose maintained within target range  Description: INTERVENTIONS:  - Monitor Blood Glucose as ordered  - Assess for signs and symptoms of hyperglycemia and hypoglycemia  - Administer ordered medications to maintain glucose within target range  - Assess nutritional intake and initiate nutrition service referral as needed  Outcome: Progressing     Problem: MUSCULOSKELETAL - ADULT  Goal: Maintain or return mobility to safest level of function  Description: INTERVENTIONS:  - Assess patient's ability to carry out ADLs; assess patient's baseline for ADL function and identify physical deficits which impact ability to perform ADLs (bathing, care of mouth/teeth, toileting, grooming, dressing, etc.)  - Assess/evaluate cause of self-care deficits   - Assess range of motion  - Assess patient's mobility  - Assess patient's need for assistive devices and provide as appropriate  - Encourage maximum independence but intervene and supervise when necessary  - Involve family in performance of ADLs  - Assess for home care needs following discharge   - Consider OT consult to assist with ADL evaluation and planning for discharge  - Provide patient education as appropriate  Outcome: Progressing     Problem: PAIN - ADULT  Goal: Verbalizes/displays adequate comfort  level or baseline comfort level  Description: Interventions:  - Encourage patient to monitor pain and request assistance  - Assess pain using appropriate pain scale  - Administer analgesics based on type and severity of pain and evaluate response  - Implement non-pharmacological measures as appropriate and evaluate response  - Consider cultural and social influences on pain and pain management  - Notify physician/advanced practitioner if interventions unsuccessful or patient reports new pain  Outcome: Progressing

## 2025-02-15 NOTE — ASSESSMENT & PLAN NOTE
"SUBJECTIVE:   Edel Toledo is a 83 year old female who presents for Preventive Visit.      Patient has been advised of split billing requirements and indicates understanding: Yes   Are you in the first 12 months of your Medicare coverage?  No    Healthy Habits:     In general, how would you rate your overall health?  Good    Frequency of exercise:  1 day/week    Duration of exercise:  45-60 minutes    Do you usually eat at least 4 servings of fruit and vegetables a day, include whole grains    & fiber and avoid regularly eating high fat or \"junk\" foods?  Yes    Taking medications regularly:  Yes    Ability to successfully perform activities of daily living:  No assistance needed    Home Safety:  No safety concerns identified    Hearing Impairment:  No hearing concerns    In the past 6 months, have you been bothered by leaking of urine?  No    In general, how would you rate your overall mental or emotional health?  Good      PHQ-2 Total Score: 0    Additional concerns today:  No    Do you feel safe in your environment? Yes    Have you ever done Advance Care Planning? (For example, a Health Directive, POLST, or a discussion with a medical provider or your loved ones about your wishes): Yes, advance care planning is on file.       Fall risk  Fallen 2 or more times in the past year?: (P) No  Any fall with injury in the past year?: (P) No    Cognitive Screening Clock score 2   ,Word Score 3    Do you have sleep apnea, excessive snoring or daytime drowsiness?: no    Reviewed and updated as needed this visit by clinical staff  Tobacco  Allergies  Meds  Problems  Med Hx  Surg Hx  Fam Hx          Reviewed and updated as needed this visit by Provider  Tobacco  Allergies  Meds  Problems  Med Hx  Surg Hx  Fam Hx         Social History     Tobacco Use     Smoking status: Never Smoker     Smokeless tobacco: Never Used   Substance Use Topics     Alcohol use: Yes     Alcohol/week: 17.5 standard drinks " Chronic  1.6 on admission   S/p 4 g IV mag  Follow up am labs           Alcohol Use 8/25/ZEESHAN-7 SCORE 4/14/2020 4/14/2020 8/25/2021   Raiza Salinas NP as PCP - General  Raiza Salinas 01/25/1990 (Approximate)   SpO2 96%   Breastfeeding Raiza Salinas NP  [facility]    ZEESHAN 7 TOTAL SCORE: 0

## 2025-02-15 NOTE — CASE MANAGEMENT
Case Management Assessment & Discharge Planning Note    Patient name Karson You  Location [facility] 825/[facility]-01 MRN [id number]  : 1955 Date 2/15/2025       Current Admission Date: 2025  Current Admission Diagnosis:Closed comminuted intertrochanteric fracture of left femur ([facility])   Patient Active Problem List    Diagnosis Date Noted Date Diagnosed    CHF (congestive heart failure) ([facility]) 02/15/2025     Closed comminuted intertrochanteric fracture of left femur ([facility]) 2025     Chronic kidney disease, stage 5 ([facility]) 2025     Acute on chronic heart failure with preserved ejection fraction (HFpEF) ([facility]) 2025     Leukocytosis 2025     Type 1 diabetes mellitus with hyperglycemia ([facility]) 2024     PAD (peripheral artery disease) ([facility]) 2024     Stage 2 chronic kidney disease 2024     Mixed hyperlipidemia 2024     Morbid (severe) obesity due to excess calories ([facility]) 2024     Vitamin D deficiency 2024     Chronic diastolic heart failure ([facility]) 2023     Prolonged Q-T interval on ECG 2023     Hypokalemia 2023     Hypomagnesemia 2023     Fall 11/10/2023     Adrenal insufficiency ([facility]) 11/10/2023     Orthostatic hypotension 11/10/2023     History of CVA (cerebrovascular accident) 11/10/2023     Spells of decreased attentiveness 2023     Iron deficiency anemia, unspecified 2023     Syncope 2023     Unintentional weight loss 2022     Paroxysmal A-fib ([facility]) 10/19/2022     Anemia of chronic disease 10/19/2022     Psychiatric disorder 10/19/2022       LOS (days): 1  Geometric Mean LOS (GMLOS) (days):   Days to GMLOS:     OBJECTIVE:  PATIENT READMITTED TO HOSPITAL  Risk of Unplanned Readmission Score: 35.87         Current admission status: Inpatient       Preferred Pharmacy:   [facility] - [place], [place] - [address]  [address]  [place] [address]  Phone: [phone] Fax:  [phone]    [id number] - [place], [place] - [address]  [address]  [place] [address]  Phone: [phone] Fax: [phone]    [facility] - [place], [place] - [address]  [address] [facility] [place] [address]  Phone: [phone] Fax: [phone]    [id number] - [place] - [address]  [address]  [place] [place] [address]  Phone: [phone] Fax: [phone]    Primary Care Provider: NACHO Kenney    Primary Insurance: [facility]  Secondary Insurance:     ASSESSMENT:  Active Health Care Proxies       Ofelia Raymond [facility] Representative - Daughter   Primary Phone: [phone] (Mobile)  Home Phone: [phone]                 Advance Directives  Primary Contact: Ofelia Segundo (Daughter) [phone]    Readmission Root Cause  30 Day Readmission: No    Patient Information  Admitted from:: Home  Mental Status: Alert, Confused  During Assessment patient was accompanied by: Daughter  Assessment information provided by:: Patient, Daughter  Primary Caregiver: Self  Support Systems: Self, Daughter, Family members  County of Residence: [place]  What city do you live in?: [place]  Home entry access options. Select all that apply.: Stairs  Number of steps to enter home.: 2  Do the steps have railings?: No  Type of Current Residence: [address] [place] [address] home  Upon entering residence, is there a bedroom on the main floor (no further steps)?: No  A bedroom is located on the following floor levels of residence (select all that apply):: 2nd Floor  Upon entering residence, is there a bathroom on the main floor (no further steps)?: Yes  Number of steps to 2nd floor from main floor: One Flight  Living Arrangements: Lives w/ Friend, Lives w/ Family members  Is patient a ?: No    Activities of Daily Living Prior to Admission  Functional Status: Independent  Completes ADLs independently?: Yes  Ambulates independently?:  Yes  Does patient use assisted devices?: Yes  Assisted Devices (DME) used: Walker, Straight Cane  Does patient currently own DME?: Yes  What DME does the patient currently own?: Straight Cane, Walker  Does patient have a history of Outpatient Therapy (PT/OT)?: Yes  Does the patient have a history of Short-Term Rehab?: Yes  Does patient have a history of HHC?: Yes  Does patient currently have HHC?: Yes    Current Home Health Care  Type of Current Home Care Services: Home PT, Home OT  Home Health Agency Name:: [facility]  Current Home Health Follow-Up Provider:: PCP    Patient Information Continued  Income Source: Pension/alf  Does patient have prescription coverage?: Yes  Does patient receive dialysis treatments?: No  Does patient have a history of substance abuse?: No  Does patient have a history of Mental Health Diagnosis?: No    Means of Transportation  Means of Transport to Appts:: Family transport    DISCHARGE DETAILS:       Freedom of Choice: Yes     CM contacted family/caregiver?: Yes  Were Treatment Team discharge recommendations reviewed with patient/caregiver?: Yes  Did patient/caregiver verbalize understanding of patient care needs?: N/A- going to facility  Were patient/caregiver advised of the risks associated with not following Treatment Team discharge recommendations?: Yes    Contacts  Patient Contacts: Ofelia Raymond (Daughter) [phone]  Relationship to Patient:: Family  Contact Method: Phone  Phone Number: Ofelia Raymond (Daughter)   [phone]  Reason/Outcome: Continuity of Care, Emergency Contact, Discharge Planning    Requested Home Health Care         Is the patient interested in HHC at discharge?: No  Home Health Agency Name:: [facility]    DME Referral Provided  Referral made for DME?: No    CM spoke to pt's dtr, to discuss the role of CM  Pt lives with family in a 2 story home which has 3STE  Pt has a 1st floor 1/2 bath but bedroom and bathroom are on 2nd floor  Pt doesn't drive. Pt  was recently at [facility] for SNF, so he needs some assistance with ADLs.   Pt uses a walker. Pt's had 3 falls. Pt is retired  Pt has HHA 7 days a week from [phone]  Pt is active with [facility]    If pt were to be recommended for IP rehab, pt's dtr wants [facility] as the pt had best experience there previously.

## 2025-02-15 NOTE — ANESTHESIA POSTPROCEDURE EVALUATION
Post-Op Assessment Note    Last Filed PACU Vitals:  Vitals Value Taken Time   Temp 98.1 °F (36.7 °C) 02/15/25 1015   Pulse 72 02/15/25 1038   /55 02/15/25 1030   Resp 14 02/15/25 1038   SpO2 97 % 02/15/25 1038   Vitals shown include unfiled device data.    Modified Katty:     Vitals Value Taken Time   Activity 2 02/15/25 1015   Respiration 2 02/15/25 1015   Circulation 2 02/15/25 1015   Consciousness 2 02/15/25 1015   Oxygen Saturation 2 02/15/25 1015     Modified Katty Score: 10

## 2025-02-15 NOTE — OP NOTE
OPERATIVE REPORT  PATIENT NAME: Karson You    :  1955  MRN: [id number]  Pt Location:  OR ROOM 18    SURGERY DATE: 2/15/2025    Surgeons and Role:     * Alfie Chavez MD - Primary     * Hernesto Rosen MD - Assisting     * Abiel Bautista MD - Assisting    Preop Diagnosis:  Closed fracture of left hip, initial encounter ([facility]) [S72.002A]    Post-Op Diagnosis Codes:     * Closed fracture of left hip, initial encounter ([facility]) [S72.002A]    Procedure(s):  IM nail fixation L femur pertrochanteric fracture (CPT 80612)    Specimen(s):  * No specimens in log *    Estimated Blood Loss:   100 mL    Drains:  External Urinary Catheter Large (Active)   Number of days: 1       [REMOVED] NG/OG/Enteral Tube Orogastric 18 Fr Center mouth (Removed)   Number of days: 0       Anesthesia Type:   General    Operative Indications:  Displaced L hip fracture in ambulatory patient    Operative Findings:  See below      Complications:   None      Implants: Synthes 11x360 mm long TFNA, 105 mm lag screw, 5.0 mm locking bolts x2    Procedure and Technique:  The patient's operative site, laterality, procedure, consent were verified in the preoperative area and the patient was transitioned to the operating room. General anesthesia was provided by the anesthesia team.  The patient was moved to the table in the usual fashion. Reduction was obtained via closed means and confirmed to be satisfactory on imaging. Patient's operative hip and lower extremity were prepped and draped in the usual sterile fashion. After timeout, the proximal hip incision was made and using sharp dissection, the fascia was opened. The guide pin was inserted at the greater trochanter in the usual manner and was advanced and confirmed on the AP and lateral views.  The opening Reamer was advanced to the level of the lesser trochanter. The ball-tip guidewire was advanced to the level of the physeal scar at the knee and verified on both the AP and lateral planes, no  anterior cortical perforation was appreciated.  Length was measured and sequential reaming took place up to 1.5 mm larger than the diameter of the nail. The  long Synthes trochanteric fixation nail as listed above was then introduced into the femoral canal and advanced to the appropriate positioning under direct fluoroscopic imaging.  A lateral based incision was made at the thigh and the guide pin for the lag screw was advanced in the appropriate position. The lateral cortex was opened with the opening Reamer and drilling took place for a 105 mm lag screw, which was then inserted. The set screw was then advanced and locked.  2 distal static interlock bolts were then placed using the perfect [place] technique and 2 small lateral based incisions. Final images were obtained all wounds were copiously irrigated with saline, the proximal deep layer was closed with 0 Vicryl sutures and remaining wounds were all closed with 2-O Vicryl sutures for the subcutaneous layer and staples for skin closure.  Sterile dressings consisting of Mepilex dressings. All final counts, including but not limited to instrument, sponge, needle counts were correct.  I was present for the entire procedure. The patient was transferred off the table to bed and transported extubated to the PACU in stable condition. There were no immediate complications.    The patient will be weight bearing as tolerated on the operative extremity. They will require dvt prophylaxis for 4 wks and may resume eliquis for this purpose. Staple removal can be considered in 2 weeks.     Patient Disposition:  PACU              SIGNATURE: Alfie Chavez MD  DATE: February 15, 2025  TIME: 9:36 AM

## 2025-02-15 NOTE — H&P
"H&P - Trauma   Name: Karson You 69 y.o. male I MRN: [id number]  Unit[facility]Bed[facility]01 I Date of Admission: 2/14/2025   Date of Service: 2/15/2025 I Hospital Day: 1     Assessment & Plan  Closed comminuted intertrochanteric fracture of left femur (HCC)  Ortho consulted, appreciate recs  NWB LLE  Plan for OR tomorrow  NPO after midnight   Preop labs drawn  PT/OT as indicated  Multimodal pain regimen  DVT ppx: okay per ortho, resume home eliquis  Hypokalemia  Chronic, in setting of adrenal insufficiency and loop diuretics  Initial K of 2.4  S/p repletion with IV and PO potassium  Follow up am BMP  Hypomagnesemia  Chronic  1.6 on admission   S/p 4 g IV mag  Follow up am labs  Paroxysmal A-fib (HCC)  Continue home eliquis  Adrenal insufficiency (HCC)  Continue home fludrocortisone and hydrocortisone  CHF (congestive heart failure) (HCC)  Wt Readings from Last 3 Encounters:   02/14/25 85.4 kg (188 lb 4.4 oz)   02/07/25 83.4 kg (183 lb 14.4 oz)   02/04/25 81.6 kg (180 lb)   Last echo 2023- EF 60%  Hold home lasix in setting of hypokalemia  BNP pending    Trauma Alert: Evaluation; trauma team arrived at 6:30 pm    Model of Arrival: Ambulance    Trauma Team: Attending Dr. Jackson, Residents Dr. Desai, and Fellow Dr. Grant  Consultants:     Orthopedics:   - STAT consult; arrived at 1553;     History of Present Illness   Chief Complaint: left hip pain   Mechanism:Fall     Karson You is a 69 y.o. male who presented to [facility] ED this evening for evaluation of left hip pain. Patient is accompanied by daughter who assisted in providing history. She states the patient had a mechanical fall onto left hip today while getting out of bed and has since not been able to bear weight 2/2 pain. Patient takes eliquis. No head strike or LOC. No other complaints at this time. In ED, xray positive for \"comminuted angulated intertrochanteric femoral fracture\".         Review of Systems   Unable to perform ROS: Other (expressive aphasia " s/p CVA)     Medical History Review: I have reviewed the patient's PMH, PSH, Social History, Family History, Meds, and Allergies   Immunization History   Administered Date(s) Administered    COVID-19 MODERNA VACC 0.5 ML IM 04/02/2021, 04/30/2021    COVID-19 Pfizer Vac BIVALENT Eddie-sucrose 12 Yr+ IM 09/20/2022    INFLUENZA 11/18/2005, 12/21/2012, 10/17/2018, 11/20/2020, 11/18/2021    Influenza Quadrivalent Preservative Free 3 years and older IM 10/27/2020, 11/20/2020, 11/18/2021    Influenza, high dose seasonal 0.7 mL 12/04/2023    Pneumococcal Conjugate 13-Valent 05/19/2022    Pneumococcal Polysaccharide PPV23 04/22/2009, 04/28/2009    Tdap 03/08/2009, 12/16/2020, 12/31/2023    Zoster Vaccine Recombinant 04/23/2021     Last Tetanus: 2023    1. Before the illness or injury that brought you to the Emergency, did you need someone to help you on a regular basis? 1=Yes   2. Since the illness or injury that brought you to the Emergency, have you needed more help than usual to take care of yourself? 1=Yes   3. Have you been hospitalized for one or more nights during the past 6 months (excluding a stay in the Emergency Department)? 1=Yes   4. In general, do you see well? 0=Yes   5. In general, do you have serious problems with your memory? 0=No   6. Do you take more than three different medications everyday? 1=Yes   TOTAL   4     Did you order a geriatric consult if the score was 2 or greater?: yes       Objective :  Temp:  [98.9 °F (37.2 °C)] 98.9 °F (37.2 °C)  HR:  [76-84] 76  BP: (128-148)/(60-80) 128/61  Resp:  [12-18] 17  SpO2:  [96 %-99 %] 98 %  O2 Device: None (Room air)    Initial Vitals:   Temperature: 98.9 °F (37.2 °C) (02/14/25 1126)  Pulse: 83 (02/14/25 1128)  Respirations: 18 (02/14/25 1126)  Blood Pressure: 148/80 (02/14/25 1128)    Primary Survey:   Airway:        Status: patent;        Pre-hospital Interventions: none        Hospital Interventions: none  Breathing:        Pre-hospital Interventions: none        Effort: normal       Right breath sounds: normal       Left breath sounds: normal  Circulation:        Rhythm: regular       Rate: regular   Right Pulses Left Pulses    R radial: 2+    R pedal: 2+     L radial: 2+    L pedal: 2+       Disability: Interventions: Baseline expressive aphasia, answers questions with one word         GCS:        Right Pupil: round;  reactive         Left Pupil:  round;  reactive      R Motor Strength L Motor Strength    R : 5/5  R dorsiflex: 5/5  R plantarflex: 5/5 L : 5/5  L dorsiflex: 5/5  L plantarflex: 5/5        Sensory:  No sensory deficit  Exposure:       Completed: Yes      Secondary Survey:  Physical Exam  Constitutional:       General: He is not in acute distress.     Appearance: Normal appearance. He is not ill-appearing, toxic-appearing or diaphoretic.   HENT:      Head: Normocephalic and atraumatic.      Nose: Nose normal.      Mouth/Throat:      Mouth: Mucous membranes are moist.      Pharynx: Oropharynx is clear.   Eyes:      Extraocular Movements: Extraocular movements intact.      Conjunctiva/sclera: Conjunctivae normal.      Pupils: Pupils are equal, round, and reactive to light.   Cardiovascular:      Rate and Rhythm: Normal rate and regular rhythm.      Pulses: Normal pulses.      Heart sounds: Normal heart sounds.   Pulmonary:      Effort: Pulmonary effort is normal.      Comments: Decreased at bases    Abdominal:      General: Abdomen is flat. There is no distension.      Palpations: Abdomen is soft.      Tenderness: There is no abdominal tenderness.   Musculoskeletal:         General: Tenderness (left hip) and deformity present. No swelling.      Cervical back: Normal range of motion and neck supple.      Comments: Decreased ROM of left hip    Left leg is shortened and externally rotated     Skin:     General: Skin is warm and dry.      Capillary Refill: Capillary refill takes less than 2 seconds.      Findings: No bruising or lesion.   Neurological:       Mental Status: He is alert. Mental status is at baseline.   Psychiatric:         Mood and Affect: Mood normal.         Behavior: Behavior normal.             Lab Results: I have reviewed the following results:  Recent Labs     02/14/25  1529 02/14/25  1529 02/14/25  1705 02/15/25  0015   WBC 14.52*  --   --   --    HGB 9.2*  --   --   --    HCT 28.5*  --   --   --      --   --   --    SODIUM 141  --   --  143   K 2.4*  --   --  3.2*     --   --  103   CO2 31  --   --  33*   BUN 15  --   --  15   CREATININE 0.93  --   --  0.86   GLUC 114  --   --  152*   MG  --    < > 1.6* 2.3   PHOS  --   --  3.4  --    PTT 32  --   --   --    INR 1.39*  --   --   --    HSTNI0 55*  --   --   --    HSTNI2  --   --  51*  --     < > = values in this interval not displayed.       Imaging Results: I have personally reviewed pertinent images saved in PACS. CT scan findings (and other pertinent positive findings on images) were discussed with radiology. My interpretation of the images/reports are as follows:  Chest Xray(s): positive for acute findings: Mildly enlarged cardiomediastinal silhouette. Hypoinflation limiting motion. Bilateral perihilar hazy interstitial pulmonary infiltrates noted with lower lobe predominance. Findings may reflect atypical infectious/inflammatory process of the lung    FAST exam(s): N/A   CT Scan(s): negative for acute findings   Additional Xray(s): positive for acute findings: Comminuted angulated intertrochanteric femoral fracture     Other Studies: Other Study Results Review: No additional pertinent studies reviewed.

## 2025-02-15 NOTE — ANESTHESIA POSTPROCEDURE EVALUATION
Post-Op Assessment Note    CV Status:  Stable  Pain Score: 0    Pain management: adequate    Multimodal analgesia used between 6 hours prior to anesthesia start to PACU discharge    Mental Status:  Arousable and sleepy   Hydration Status:  Euvolemic   PONV Controlled:  Controlled   Airway Patency:  Patent  Two or more mitigation strategies used for obstructive sleep apnea   Post Op Vitals Reviewed: Yes    No anethesia notable event occurred.    Staff: CRNA           Last Filed PACU Vitals:  Vitals Value Taken Time   Temp 99.3 °F (37.4 °C) 02/15/25 0946   Pulse 72 02/15/25 0949   BP 94/48 02/15/25 0948   Resp 29 02/15/25 0949   SpO2 100 % 02/15/25 0949   Vitals shown include unfiled device data.

## 2025-02-16 PROBLEM — D62 ACUTE BLOOD LOSS ANEMIA: Status: ACTIVE | Noted: 2025-02-16

## 2025-02-16 LAB
ANION GAP SERPL CALCULATED.3IONS-SCNC: 4 MMOL/L (ref 4–13)
BASOPHILS # BLD AUTO: 0.02 THOUSANDS/ΜL (ref 0–0.1)
BASOPHILS NFR BLD AUTO: 0 % (ref 0–1)
BUN SERPL-MCNC: 20 MG/DL (ref 5–25)
CALCIUM SERPL-MCNC: 7.6 MG/DL (ref 8.4–10.2)
CARDIAC TROPONIN I PNL SERPL HS: 24 NG/L (ref 8–18)
CHLORIDE SERPL-SCNC: 104 MMOL/L (ref 96–108)
CO2 SERPL-SCNC: 31 MMOL/L (ref 21–32)
CREAT SERPL-MCNC: 0.99 MG/DL (ref 0.6–1.3)
EOSINOPHIL # BLD AUTO: 0.14 THOUSAND/ΜL (ref 0–0.61)
EOSINOPHIL NFR BLD AUTO: 2 % (ref 0–6)
ERYTHROCYTE [DISTWIDTH] IN BLOOD BY AUTOMATED COUNT: 14.9 % (ref 11.6–15.1)
GFR SERPL CREATININE-BSD FRML MDRD: 77 ML/MIN/1.73SQ M
GLUCOSE SERPL-MCNC: 304 MG/DL (ref 65–140)
GLUCOSE SERPL-MCNC: 316 MG/DL (ref 65–140)
GLUCOSE SERPL-MCNC: 322 MG/DL (ref 65–140)
GLUCOSE SERPL-MCNC: 345 MG/DL (ref 65–140)
GLUCOSE SERPL-MCNC: 374 MG/DL (ref 65–140)
HCT VFR BLD AUTO: 22.1 % (ref 36.5–49.3)
HCT VFR BLD AUTO: 25.8 % (ref 36.5–49.3)
HGB BLD-MCNC: 7 G/DL (ref 12–17)
HGB BLD-MCNC: 8.1 G/DL (ref 12–17)
IMM GRANULOCYTES # BLD AUTO: 0.05 THOUSAND/UL (ref 0–0.2)
IMM GRANULOCYTES NFR BLD AUTO: 1 % (ref 0–2)
LYMPHOCYTES # BLD AUTO: 1.8 THOUSANDS/ΜL (ref 0.6–4.47)
LYMPHOCYTES NFR BLD AUTO: 19 % (ref 14–44)
MCH RBC QN AUTO: 31.5 PG (ref 26.8–34.3)
MCHC RBC AUTO-ENTMCNC: 31.7 G/DL (ref 31.4–37.4)
MCV RBC AUTO: 100 FL (ref 82–98)
MONOCYTES # BLD AUTO: 1.05 THOUSAND/ΜL (ref 0.17–1.22)
MONOCYTES NFR BLD AUTO: 11 % (ref 4–12)
NEUTROPHILS # BLD AUTO: 6.21 THOUSANDS/ΜL (ref 1.85–7.62)
NEUTS SEG NFR BLD AUTO: 67 % (ref 43–75)
NRBC BLD AUTO-RTO: 0 /100 WBCS
PLATELET # BLD AUTO: 136 THOUSANDS/UL (ref 149–390)
PMV BLD AUTO: 11.2 FL (ref 8.9–12.7)
POTASSIUM SERPL-SCNC: 3.5 MMOL/L (ref 3.5–5.3)
RBC # BLD AUTO: 2.22 MILLION/UL (ref 3.88–5.62)
SODIUM SERPL-SCNC: 139 MMOL/L (ref 135–147)
WBC # BLD AUTO: 9.27 THOUSAND/UL (ref 4.31–10.16)

## 2025-02-16 PROCEDURE — 80048 BASIC METABOLIC PNL TOTAL CA: CPT

## 2025-02-16 PROCEDURE — 85018 HEMOGLOBIN: CPT

## 2025-02-16 PROCEDURE — 85025 COMPLETE CBC W/AUTO DIFF WBC: CPT

## 2025-02-16 PROCEDURE — NC001 PR NO CHARGE: Performed by: ORTHOPAEDIC SURGERY

## 2025-02-16 PROCEDURE — 82948 REAGENT STRIP/BLOOD GLUCOSE: CPT

## 2025-02-16 PROCEDURE — 97163 PT EVAL HIGH COMPLEX 45 MIN: CPT

## 2025-02-16 PROCEDURE — 84484 ASSAY OF TROPONIN QUANT: CPT

## 2025-02-16 PROCEDURE — 30233N1 TRANSFUSION OF NONAUTOLOGOUS RED BLOOD CELLS INTO PERIPHERAL VEIN, PERCUTANEOUS APPROACH: ICD-10-PCS | Performed by: SURGERY

## 2025-02-16 PROCEDURE — 85014 HEMATOCRIT: CPT

## 2025-02-16 PROCEDURE — P9016 RBC LEUKOCYTES REDUCED: HCPCS

## 2025-02-16 PROCEDURE — 97167 OT EVAL HIGH COMPLEX 60 MIN: CPT

## 2025-02-16 RX ORDER — AMOXICILLIN 250 MG
1 CAPSULE ORAL
Status: DISCONTINUED | OUTPATIENT
Start: 2025-02-16 | End: 2025-02-18 | Stop reason: HOSPADM

## 2025-02-16 RX ORDER — INSULIN LISPRO 100 [IU]/ML
4 INJECTION, SOLUTION INTRAVENOUS; SUBCUTANEOUS
Status: DISCONTINUED | OUTPATIENT
Start: 2025-02-16 | End: 2025-02-18 | Stop reason: HOSPADM

## 2025-02-16 RX ORDER — INSULIN GLARGINE 100 [IU]/ML
12 INJECTION, SOLUTION SUBCUTANEOUS
Status: DISCONTINUED | OUTPATIENT
Start: 2025-02-16 | End: 2025-02-18 | Stop reason: HOSPADM

## 2025-02-16 RX ADMIN — APIXABAN 5 MG: 5 TABLET, FILM COATED ORAL at 17:06

## 2025-02-16 RX ADMIN — ASPIRIN 81 MG CHEWABLE TABLET 81 MG: 81 TABLET CHEWABLE at 08:29

## 2025-02-16 RX ADMIN — INSULIN GLARGINE 12 UNITS: 100 INJECTION, SOLUTION SUBCUTANEOUS at 23:16

## 2025-02-16 RX ADMIN — INSULIN LISPRO 3 UNITS: 100 INJECTION, SOLUTION INTRAVENOUS; SUBCUTANEOUS at 17:05

## 2025-02-16 RX ADMIN — INSULIN LISPRO 4 UNITS: 100 INJECTION, SOLUTION INTRAVENOUS; SUBCUTANEOUS at 17:05

## 2025-02-16 RX ADMIN — FLUDROCORTISONE ACETATE 0.2 MG: 0.1 TABLET ORAL at 10:27

## 2025-02-16 RX ADMIN — ACETAMINOPHEN 975 MG: 325 TABLET, FILM COATED ORAL at 05:13

## 2025-02-16 RX ADMIN — SERTRALINE 100 MG: 100 TABLET, FILM COATED ORAL at 08:29

## 2025-02-16 RX ADMIN — HYDROCORTISONE 5 MG: 5 TABLET ORAL at 10:27

## 2025-02-16 RX ADMIN — SENNOSIDES AND DOCUSATE SODIUM 1 TABLET: 50; 8.6 TABLET ORAL at 23:09

## 2025-02-16 RX ADMIN — APIXABAN 5 MG: 5 TABLET, FILM COATED ORAL at 08:28

## 2025-02-16 RX ADMIN — ACETAMINOPHEN 975 MG: 325 TABLET, FILM COATED ORAL at 23:09

## 2025-02-16 RX ADMIN — ATORVASTATIN CALCIUM 10 MG: 10 TABLET, FILM COATED ORAL at 08:29

## 2025-02-16 RX ADMIN — INSULIN LISPRO 4 UNITS: 100 INJECTION, SOLUTION INTRAVENOUS; SUBCUTANEOUS at 11:50

## 2025-02-16 RX ADMIN — INSULIN LISPRO 3 UNITS: 100 INJECTION, SOLUTION INTRAVENOUS; SUBCUTANEOUS at 08:30

## 2025-02-16 RX ADMIN — OXYCODONE HYDROCHLORIDE 5 MG: 5 TABLET ORAL at 05:12

## 2025-02-16 RX ADMIN — ACETAMINOPHEN 975 MG: 325 TABLET, FILM COATED ORAL at 13:27

## 2025-02-16 RX ADMIN — INSULIN LISPRO 3 UNITS: 100 INJECTION, SOLUTION INTRAVENOUS; SUBCUTANEOUS at 11:50

## 2025-02-16 NOTE — QUICK NOTE
Consent for blood transfusion obtained from patient.     Contacted patient's daughter, Ofelia, via telephone to provide update that patient would be receiving blood transfusion in setting of ABLA post-operatively. Daughter understands and is agreeable to plan.

## 2025-02-16 NOTE — PROGRESS NOTES
"Progress Note - Orthopedics   Name: Karson You 70 y.o. male I MRN: [id number]  Unit/Bed#: [facility] 825-01 I Date of Admission: 2/14/2025   Date of Service: 2/16/2025 I Hospital Day: 2    Assessment & Plan  Closed comminuted intertrochanteric fracture of left femur (HCC)  Assessment:    70 y.o. male POD 1 Left INSERTION NAIL IM FEMUR ANTEGRADE (TROCHANTERIC) doing well postoperatively.      Plan:  WBAT LLE  PT/OT  Pain control  DVT ppx: eliquis  Will monitor for ABLA and administer IVF/prbc as indicated for Greater than 2 gram drop or Hgb < 7             Subjective   70 y.o.male No acute events, no new complaints. Pain well controlled. Denies fevers, chills, CP, SOB, N/V, numbness or tingling. Doing well.    Objective :  Temp:  [98 °F (36.7 °C)-99.3 °F (37.4 °C)] 98.3 °F (36.8 °C)  HR:  [71-80] 75  BP: ()/(44-67) 114/49  Resp:  [12-22] 20  SpO2:  [92 %-100 %] 94 %  O2 Device: Nasal cannula  Nasal Cannula O2 Flow Rate (L/min):  [2 L/min] 2 L/min    Physical Exam  Musculoskeletal: Left Lower Extremity  Skin intact without erythema or ecchymosis  Dressing c/d/i  TTP nadia-incisionally  Sensation intact to light touch nedra/saph/sp/dp/tib  Motor intact EHL/FHL, ankle DF/PF  2+ DP pulse      Lab Results: I have reviewed the following results:  Recent Labs     02/14/25  1529 02/15/25  0015 02/15/25  0606 02/16/25  0606   WBC 14.52*  --  11.64* 9.27   HGB 9.2*  --  9.1* 7.0*   HCT 28.5*  --  29.1* 22.1*     --  162 136*   BUN 15 15 16  --    CREATININE 0.93 0.86 0.85  --    PTT 32  --   --   --    INR 1.39*  --   --   --      Blood Culture:    Lab Results   Component Value Date    BLOODCX Staphylococcus epidermidis (A) 01/07/2025     Wound Culture: No results found for: \"WOUNDCULT\"        "

## 2025-02-16 NOTE — OCCUPATIONAL THERAPY NOTE
Occupational Therapy Evaluation     Patient Name: Karson You  Today's Date: 2/16/2025  Problem List  Active Problems:    Paroxysmal A-fib (HCC)    Adrenal insufficiency (HCC)    Hypokalemia    Hypomagnesemia    Closed comminuted intertrochanteric fracture of left femur (HCC)    CHF (congestive heart failure) (HCC)    Acute blood loss anemia    Past Medical History  Past Medical History:   Diagnosis Date    A-fib (HCC)     Adrenal insufficiency (HCC)     Atrial fibrillation (HCC)     Diabetes mellitus (HCC)     History of stroke 10/19/2022    Obesity, morbid (HCC) 11/14/2022    Shock (HCC) 12/14/2023    Stroke (HCC)     Type 2 MI (myocardial infarction) (HCC) 11/03/2022     Past Surgical History  History reviewed. No pertinent surgical history.        02/16/25 0843   OT Last Visit   OT Visit Date 02/16/25   Note Type   Note type Evaluation   Pain Assessment   Pain Assessment Tool 0-10   Pain Score 8   Pain Location/Orientation Orientation: Left;Location: Leg   Effect of Pain on Daily Activities limits comfort, activity tolerance, mobility, and I w/ ADLs   Patient's Stated Pain Goal No pain   Hospital Pain Intervention(s) Repositioned;Ambulation/increased activity;Emotional support   Restrictions/Precautions   Weight Bearing Precautions Per Order Yes   LLE Weight Bearing Per Order WBAT   Other Precautions Cognitive;Chair Alarm;Bed Alarm;Multiple lines;Fall Risk;Pain   Home Living   Type of Home House   Home Layout Two level;1/2 bath on main level;Bed/bath upstairs  (3 RYAN)   Bathroom Shower/Tub Walk-in shower   Bathroom Toilet Standard   Bathroom Equipment Grab bars in shower;Shower chair   Home Equipment Walker  (used PTA)   Additional Comments Pt is a questionable historian. Information gathered both via pt interview and chart review.   Prior Function   Level of [place] Independent with ADLs;Independent with functional mobility   Lives With Family;Friend(s)   Receives Help From Family;Friend(s);Personal  care attendant  (Per chart review, pt has HHA from 7324-6378 7x/wk.)   Falls in the last 6 months 1 to 4  (Per chart review, pt w/ 3 falls.)   Vocational Retired   Comments Pt is a questionable historian. Information gathered both via pt interview and chart review.   Lifestyle   Autonomy Pt reports I w/ ADLs and fxnl mobility w/ RW at baseline.   Reciprocal Relationships Pt lives w/ his sister and friend; reports someone is alway home with him. Per chart review, pt has HHA from 7877-1201 7x/wk.   Service to Others Pt is retired.   Intrinsic Gratification Pt enjoys watching TV.   General   Family/Caregiver Present No   ADL   Where Assessed Edge of bed   Eating Assistance 5  Supervision/Setup   Grooming Assistance 4  Minimal Assistance   UB Bathing Assistance 3  Moderate Assistance   LB Bathing Assistance 2  Maximal Assistance   UB Dressing Assistance 3  Moderate Assistance   LB Dressing Assistance 2  Maximal Assistance   LB Dressing Deficit Setup;Don/doff R sock;Don/doff L sock   Toileting Assistance  2  Maximal Assistance   Bed Mobility   Supine to Sit 3  Moderate assistance   Additional items Assist x 2;HOB elevated;Bedrails;Increased time required;Verbal cues;LE management   Sit to Supine Unable to assess   Additional Comments Pt seated OOB in chair at end of OT evaluation w/ alarm activated and all needs within reach.   Transfers   Sit to Stand 3  Moderate assistance   Additional items Assist x 2;Increased time required;Verbal cues   Stand to Sit 3  Moderate assistance   Additional items Assist x 2;Increased time required;Verbal cues   Stand pivot 3  Moderate assistance   Additional items Assist x 2;Increased time required;Verbal cues   Additional Comments w/ RW for support   Functional Mobility   Additional Comments NT; will continue to assess   Balance   Static Sitting Fair -   Dynamic Sitting Poor +   Static Standing Poor   Dynamic Standing Poor -   Ambulatory Poor -   Activity Tolerance   Activity Tolerance  Patient limited by pain;Patient limited by fatigue;Other (Comment)  (impaired cognition)   Medical Staff Made Aware PTRachna, due to pt's medical complexity and multiple comorbidities   Nurse Made Aware RN clearance prior to session   RUE Assessment   RUE Assessment WFL   LUE Assessment   LUE Assessment WFL   Hand Function   Gross Motor Coordination Functional   Fine Motor Coordination Functional   Cognition   Overall Cognitive Status Impaired   Arousal/Participation Alert;Cooperative   Attention Attends with cues to redirect   Orientation Level Oriented to person;Oriented to place;Oriented to time   Memory Decreased recall of precautions   Following Commands Follows one step commands with increased time or repetition   Comments Pt was identified by name and . Pt was pleasantly confused, cooperative, and willing to participate in OT evaluation. Pt required increased processing and response time to answer questions and follow commands; dazed.   Assessment   Limitation Decreased ADL status;Decreased Safe judgement during ADL;Decreased cognition;Decreased endurance;Decreased self-care trans;Decreased high-level ADLs   Assessment Pt is a 70 y.o. male seen for OT evaluation s/p admission [facility] on [facility] s/p fall. Pt diagnosed with closed comminuted intertrochanteric fx of L femur; s/p L IM nail insertion on 2/15. Per ortho, pt is WBAT LLE. Pt has a significant PMH impacting occupational performance including: A-fib (HCC), Adrenal insufficiency (HCC), Atrial fibrillation (HCC), Diabetes mellitus (HCC), History of stroke, Obesity, morbid (HCC), Shock (HCC), Stroke (HCC), and Type 2 MI (myocardial infarction) (HCC). Pt with active OT evaluation and treatment orders and activity orders. PTA, pt living with his friend and sister in a 2 SH w/ 3 RYAN. Pt reports I w/ ADLs and fxnl mobility w/ RW at baseline. Pt agreeable and willing to participate in OT evaluation. During evaluation, pt was S for  eating, min A for grooming, mod A for UB ADLs, and max A for LB ADLs and toileting. Pt also required mod Ax2 for bed mobility and transfers w/ b/l HHA. Pt required increased processing and response time t/o session; seemed dazed. Performance deficits that affect the pt’s occupational performance during the initial evaluation include decreased ADL status, decreased activity tolerance, decreased endurance, decreased sitting tolerance, decreased sitting balance, decreased standing tolerance, decreased standing balance, decreased transfer skills, decreased fxnl mobility, pain, and impaired cognition . Based on pt’s functional performance and deficits the following occupations will be addressed in OT treatments in order to maximize pt’s independence and overall occupational performance: grooming, bathing/showering, toileting and toilet hygiene, dressing, and functional mobility. Goals are listed below.  From OT perspective, recommend Level II (Moderate Resource Intensity) upon d/c when pt medically stable to d/c from acute care. Will continue to follow.   Goals   Patient Goals none stated at this time   [facility] Time Frame 10-14   Plan   Treatment Interventions ADL retraining;Functional transfer training;Endurance training;Patient/family training;Equipment evaluation/education;Compensatory technique education;Continued evaluation;Energy conservation;Activityengagement   Goal Expiration Date 03/02/25   OT Treatment Day 0   OT Frequency 2-3x/wk   Discharge Recommendation   Rehab Resource Intensity Level, OT II (Moderate Resource Intensity)   AM-PAC Daily Activity Inpatient   Lower Body Dressing 2   Bathing 2   Toileting 2   Upper Body Dressing 2   Grooming 3   Eating 3   Daily Activity Raw Score 14   Daily Activity Standardized Score (Calc for Raw Score >=11) 33.39   AM-PAC Applied Cognition Inpatient   Following a Speech/Presentation 2   Understanding Ordinary Conversation 3   Taking Medications 2   Remembering Where Things  Are Placed or Put Away 3   Remembering List of 4-5 Errands 2   Taking Care of Complicated Tasks 2   Applied Cognition Raw Score 14   Applied Cognition Standardized Score 32.02       The patient's raw score on the AM-PAC Daily Activity Inpatient Short Form is 14. A raw score of less than 19 suggests the patient may benefit from discharge to post-acute rehabilitation services. Please refer to the recommendation of the Occupational Therapist for safe discharge planning.    Goals: OTR to further assess pt's fxnl mobility as appropriate     - Pt will complete UB ADLs w/ S to maximize independence and return home.    - Pt will complete LB ADLs w/ min A to maximize independence and return home.     - Pt will complete toileting routine (transfers, hygiene, and clothing management) w/ min A to maximize independence and return to prior level of function.    - Pt will complete bed mobility supine >< sit w/ min Ax1 to maximize independence and return home.    - Pt will transfer to bed, chair, and toilet w/ min Ax1 using AD / DME as needed to maximize independence and reduce burden of care.     - Pt will increase activity tolerance (and sitting tolerance) by eating all meals OOB in the chair.     - Pt will increase standing tolerance to 2-3 minutes to maximize independence w/ grooming tasks standing at the sink.     - Pt will tolerate therapeutic activities for greater than 30 minutes in order to increase tolerance for functional activities.     - Pt will participate in ongoing OT evaluation of cognitive skills to assist with safe d/c planning/recommendations.      ALLI Olmedo, OTR/L

## 2025-02-16 NOTE — PROGRESS NOTES
Progress Note - Trauma   Name: Karson You 70 y.o. male I MRN: [id number]  [facility] I Date of Admission: 2/14/2025   Date of Service: 2/16/2025 I Hospital Day: 2     Assessment & Plan  Acute blood loss anemia  Hgb 7 today post-operatively from 9.1   Will consent for blood transfusion and administer 2 units pRBCs  Continue to monitor H&H  Assessment & Plan  Closed comminuted intertrochanteric fracture of left femur (HCC)  -Ortho consulted, appreciate recs  -POD 1 Left INSERTION NAIL IM FEMUR ANTEGRADE (TROCHANTERIC) doing well postoperatively.   -NWB LLE  -PT/OT as indicated  -Multimodal pain regimen  -DVT ppx: okay per ortho, resume home eliquis  -Will monitor for ABLA and administer IVF/prbc as indicated for Greater than 2 gram drop or Hgb < 7     Hypokalemia  Chronic, in setting of adrenal insufficiency and loop diuretics  Initial K of 2.4  S/p repletion with IV and PO potassium  Monitor BMP  Hypomagnesemia  Chronic  1.6 on admission   S/p 4 g IV mag    Paroxysmal A-fib (HCC)  Continue home eliquis    Adrenal insufficiency (HCC)  Continue home fludrocortisone and hydrocortisone    CHF (congestive heart failure) ([facility])  Wt Readings from Last 3 Encounters:   02/14/25 85.4 kg (188 lb 4.4 oz)   02/07/25 83.4 kg (183 lb 14.4 oz)   02/04/25 81.6 kg (180 lb)   Last echo 2023- EF 60%  Hold home lasix in setting of hypokalemia  BNP elevated, 360 as of 2/14    Bowel Regimen: senna-s   VTE Prophylaxis:VTE covered by:  apixaban, Oral, 5 mg at 02/16/25 0828         Disposition: continue inpatient on trauma service, pending PT/OT evaluation  Medications reviewed. Continue current medications at prescribed doses.    24 Hour Events : Patient mildly hypotensive post-operatively but has since been HD stable. Hgb down >2 grams post-op.   Subjective : Patient has no complaints at time of evaluation      Objective :  Temp:  [97.8 °F (36.6 °C)-99.3 °F (37.4 °C)] 97.8 °F (36.6 °C)  HR:  [70-80] 70  BP: ()/(44-67)  113/49  Resp:  [12-22] 18  SpO2:  [92 %-100 %] 94 %  O2 Device: Nasal cannula  Nasal Cannula O2 Flow Rate (L/min):  [2 L/min] 2 L/min    I/O         02/14 0701  02/15 0700 02/15 0701  02/16 0700 02/16 0701 02/17 0700    P.O.  220     IV Piggyback  100     Total Intake(mL/kg)  320 (3.7)     Urine (mL/kg/hr)  0 (0)     Blood  100     Total Output  100     Net  +220            Unmeasured Urine Occurrence 1 x 2 x           Lines/Drains/Airways       Active Status       Name Placement date Placement time Site Days    External Urinary Catheter Large 02/14/25  2330  -- 1                  Physical Exam  Constitutional:       Appearance: Normal appearance.   HENT:      Head: Normocephalic and atraumatic.   Eyes:      Extraocular Movements: Extraocular movements intact.      Conjunctiva/sclera: Conjunctivae normal.   Cardiovascular:      Rate and Rhythm: Normal rate and regular rhythm.      Heart sounds: Normal heart sounds.   Pulmonary:      Effort: Pulmonary effort is normal.   Abdominal:      General: Abdomen is flat.      Palpations: Abdomen is soft.   Musculoskeletal:         General: Tenderness (mild over surgical incisions) present.      Cervical back: Normal range of motion and neck supple.      Comments: Left hip, thigh and buttocks soft    Skin:     General: Skin is warm and dry.      Capillary Refill: Capillary refill takes less than 2 seconds.      Comments: 2x mepelex overlying left hip incisions. Clean, dry and intact     Neurological:      Mental Status: He is alert. Mental status is at baseline.   Psychiatric:         Mood and Affect: Mood normal.         Behavior: Behavior normal.               Lab Results: I have reviewed the following results:  Recent Labs     02/14/25  1529 02/14/25  1705 02/15/25  0015 02/15/25  0606 02/16/25  0606   WBC 14.52*  --   --  11.64* 9.27   HGB 9.2*  --   --  9.1* 7.0*   HCT 28.5*  --   --  29.1* 22.1*     --   --  162 136*   SODIUM 141  --    < > 142  --    K 2.4*  --     < > 3.4*  --      --    < > 105  --    CO2 31  --    < > 29  --    BUN 15  --    < > 16  --    CREATININE 0.93  --    < > 0.85  --    GLUC 114  --    < > 141*  --    MG  --  1.6*   < > 2.2  --    PHOS  --  3.4  --   --   --    PTT 32  --   --   --   --    INR 1.39*  --   --   --   --    HSTNI0 55*  --   --   --   --    HSTNI2  --  51*  --   --   --    *  --   --   --   --     < > = values in this interval not displayed.       Imaging Results Review: No pertinent imaging studies reviewed.  Other Study Results Review: No additional pertinent studies reviewed.

## 2025-02-16 NOTE — PLAN OF CARE
Problem: OCCUPATIONAL THERAPY ADULT  Goal: Performs self-care activities at highest level of function for planned discharge setting.  See evaluation for individualized goals.  Description: Treatment Interventions: ADL retraining, Functional transfer training, Endurance training, Patient/family training, Equipment evaluation/education, Compensatory technique education, Continued evaluation, Energy conservation, Activityengagement          See flowsheet documentation for full assessment, interventions and recommendations.   Note: Limitation: Decreased ADL status, Decreased Safe judgement during ADL, Decreased cognition, Decreased endurance, Decreased self-care trans, Decreased high-level ADLs     Assessment: Pt is a 70 y.o. male seen for OT evaluation s/p admission to [facility] on 2/14/2025 s/p fall. Pt diagnosed with closed comminuted intertrochanteric fx of L femur; s/p L IM nail insertion on 2/15. Per ortho, pt is WBAT LLE. Pt has a significant PMH impacting occupational performance including: A-fib ([facility]), Adrenal insufficiency (HCC), Atrial fibrillation (HCC), Diabetes mellitus (HCC), History of stroke, Obesity, morbid (HCC), Shock (HCC), Stroke (HCC), and Type 2 MI (myocardial infarction) ([facility]). Pt with active OT evaluation and treatment orders and activity orders. PTA, pt living with his friend and sister in a 2 SH w/ 3 RYAN. Pt reports I w/ ADLs and fxnl mobility w/ RW at baseline. Pt agreeable and willing to participate in OT evaluation. During evaluation, pt was S for eating, min A for grooming, mod A for UB ADLs, and max A for LB ADLs and toileting. Pt also required mod Ax2 for bed mobility and transfers w/ b/l HHA. Pt required increased processing and response time t/o session; seemed dazed. Performance deficits that affect the pt’s occupational performance during the initial evaluation include decreased ADL status, decreased activity tolerance, decreased endurance, decreased sitting tolerance,  decreased sitting balance, decreased standing tolerance, decreased standing balance, decreased transfer skills, decreased fxnl mobility, pain, and impaired cognition . Based on pt’s functional performance and deficits the following occupations will be addressed in OT treatments in order to maximize pt’s independence and overall occupational performance: grooming, bathing/showering, toileting and toilet hygiene, dressing, and functional mobility. Goals are listed below.  From OT perspective, recommend Level II (Moderate Resource Intensity) upon d/c when pt medically stable to d/c from acute care. Will continue to follow.     Rehab Resource Intensity Level, OT: II (Moderate Resource Intensity)

## 2025-02-16 NOTE — ASSESSMENT & PLAN NOTE
Assessment:    70 y.o. male POD 1 Left INSERTION NAIL IM FEMUR ANTEGRADE (TROCHANTERIC) doing well postoperatively.      Plan:  WBAT LLE  PT/OT  Pain control  DVT ppx: eliquis  Will monitor for ABLA and administer IVF/prbc as indicated for Greater than 2 gram drop or Hgb < 7

## 2025-02-16 NOTE — ASSESSMENT & PLAN NOTE
Hgb 7 today post-operatively from 9.1   Will consent for blood transfusion and administer 2 units pRBCs  Continue to monitor H&H

## 2025-02-16 NOTE — UTILIZATION REVIEW
Initial Clinical Review    Admission: Date/Time/Statement:   Admission Orders (From admission, onward)       Ordered        02/14/25 1756  Inpatient Admission  Once                          Orders Placed This Encounter   Procedures    Inpatient Admission     Standing Status:   Standing     Number of Occurrences:   1     Level of Care:   Med Surg [16]     Estimated length of stay:   More than 2 Midnights     Certification:   I certify that inpatient services are medically necessary for this patient for a duration of greater than two midnights. See H&P and MD Progress Notes for additional information about the patient's course of treatment.     ED Arrival Information       Expected   -    Arrival   2/14/2025 11:19    Acuity   Less Urgent              Means of arrival   Ambulance    Escorted by   [facility]    Service   Trauma    Admission type   Emergency              Arrival complaint   Fall             Chief Complaint   Patient presents with    Hip Pain     BIBA for fall when patient got out of bed, c/o left hip pain       Initial Presentation: 70 y.o. male to ED presents for Fall with Left hip pain, unable to wt bear due to pain. Per daughter, pt had a mechanical fall onto left hip today while while getting out of bed. On Eliquis. No head strike or LOC. In ED, Xray with comminuted angulated intertrochanteric femoral fracture.  PMH for CHF, Paroxysmal A fib on Eliquis, Adrenal insufficiency  Admit to Inpatient Dx; Fall with Left femur Intertrochanteric fracture, Hypokalemia, Hypomagnesemia (chronic)  Potassium 2.4, 1.6, S/p Iv Mag 4g and Iv/po potassium given  Plan; NWB LLE. Plan for OR tomorrow 2/15. NPO after MN. Multimodal pain regimen.   F/u BMP in am. Continue home Eliquis. Hold home Lasix. BNP pending.   On exam; Tenderness to left hip and deformity. Decrease ROM of left hip    2/14   Orthopedic cons; L proximal femur pertrochanteric fracture  NWB LLE. NPO. Plan for OR tomorrow 2/15. Resume Eliquis.  Pain control.     Date: 2/15   Day 2:   Per Orthopedic; Plan for OR today. NPO. NWB LLE. Pain control.  Noted on the right foot are several amputations of several toes.     Progress notes; Plan for OR today. NPO. F/u BMP in am.   Pian control.     2/15 OR - S/p IM nail fixation L femur pertrochanteric fracture (CPT 25235)       Date: 2/16  Day 3: Has surpassed a 2nd midnight with active treatments and services.  POD #1. Acute Bld Loss Anemia, Hgb 7 today postop from 9.1  Bld transfusion x2 units of PRBCs ordered. Monitor H&H. Monitor BMP.   Continue home fludrocortisone and hydrocortisone.  Multimodal pain regimen.   Pt mildly hypotensive post-op but stable.     Per Orthopedic; POD #1. WBAT LLE. Pain control.   Monitor for ABLA and administer IVF/prbc as indicated for Greater than 2 gram drop or Hgb < 7   On exam; Tenderness (mild over surgical incisions)       ED Treatment-Medication Administration from 02/14/2025 1119 to 02/14/2025 2155         Date/Time Order Dose Route Action     02/14/2025 1952 acetaminophen (TYLENOL) tablet 650 mg 650 mg Oral Given     02/14/2025 1652 potassium chloride (Klor-Con M20) CR tablet 40 mEq 40 mEq Oral Given     02/14/2025 1652 potassium chloride 20 mEq IVPB (premix) 20 mEq Intravenous New Bag     02/14/2025 1956 potassium chloride 20 mEq IVPB (premix) 20 mEq Intravenous New Bag     02/14/2025 1845 apixaban (ELIQUIS) tablet 5 mg -- Oral Held Dose     02/14/2025 1952 oxyCODONE (ROXICODONE) split tablet 2.5 mg -- Oral See Alternative     02/14/2025 1952 oxyCODONE (ROXICODONE) IR tablet 5 mg 5 mg Oral Given     02/14/2025 2109 magnesium sulfate 4 g/100 mL IVPB (premix) 4 g 4 g Intravenous New Bag            Scheduled Medications:  acetaminophen, 975 mg, Oral, Q8H RADAMES  apixaban, 5 mg, Oral, BID  aspirin, 81 mg, Oral, Daily  atorvastatin, 10 mg, Oral, Daily  fludrocortisone, 0.2 mg, Oral, Daily  [Held by provider] furosemide, 20 mg, Oral, Daily  hydrocortisone, 5 mg, Oral, Daily  insulin  glargine, 12 Units, Subcutaneous, HS  insulin lispro, 1-5 Units, Subcutaneous, TID AC  insulin lispro, 4 Units, Subcutaneous, TID With Meals  senna-docusate sodium, 1 tablet, Oral, HS  sertraline, 100 mg, Oral, Daily      Continuous IV Infusions: None     PRN Meds:  albuterol, 2 puff, Inhalation, Q4H PRN  HYDROmorphone, 0.2 mg, Intravenous, Q2H PRN  midodrine, 5 mg, Oral, TID PRN  oxyCODONE, 2.5 mg, Oral, Q4H PRN   Or  oxyCODONE, 5 mg, Oral, Q4H PRN 2/14 x1, 2/16 x1      ED Triage Vitals   Temperature Pulse Respirations Blood Pressure SpO2 Pain Score   02/14/25 1126 02/14/25 1128 02/14/25 1126 02/14/25 1128 02/14/25 1126 02/14/25 1126   98.9 °F (37.2 °C) 83 18 148/80 99 % 8     Weight (last 2 days)       Date/Time Weight    02/14/25 2203 85.4 (188.27)            Vital Signs (last 3 days)       Date/Time Temp Pulse Resp BP MAP (mmHg) SpO2 Calculated FIO2 (%) - Nasal Cannula O2 Flow Rate (L/min) Nasal Cannula O2 Flow Rate (L/min) O2 Device Cardiac (WDL) Patient Position - Orthostatic VS Tiny Coma Scale Score Pain    02/16/25 11:14:17 97.9 °F (36.6 °C) 72 16 119/57 78 97 % -- -- -- -- -- -- -- --    02/16/25 10:30:45 97.9 °F (36.6 °C) 69 17 96/50 65 96 % -- -- -- -- -- -- -- --    02/16/25 10:09:48 98.2 °F (36.8 °C) 70 16 92/49 63 98 % -- -- -- -- -- -- -- --    02/16/25 0842 -- -- -- -- -- -- -- -- -- -- -- -- -- 8    02/16/25 0752 -- -- -- -- -- -- -- -- -- -- -- -- 14 No Pain    02/16/25 07:50:04 97.8 °F (36.6 °C) 70 18 113/49 70 94 % -- -- -- -- -- -- -- --    02/16/25 0512 -- -- -- -- -- -- -- -- -- -- -- -- -- 8    02/16/25 02:51:41 98.3 °F (36.8 °C) 75 20 114/49 71 94 % -- -- -- -- -- -- -- --    02/15/25 22:59:38 98.6 °F (37 °C) 80 16 116/53 74 92 % -- -- -- -- -- -- -- --    02/15/25 2248 -- -- -- -- -- -- -- -- -- -- -- -- -- 8    02/15/25 18:58:39 98.8 °F (37.1 °C) 79 16 114/54 74 100 % -- -- -- -- -- -- -- --    02/15/25 15:56:43 98.9 °F (37.2 °C) 78 16 118/54 75 100 % -- -- -- -- -- -- -- --    02/15/25  1530 -- -- -- -- -- -- -- -- -- -- -- -- 14 --    02/15/25 1437 -- -- -- -- -- -- -- -- -- -- -- -- -- Med Not Given for Pain - for MAR use only    02/15/25 14:16:44 98.4 °F (36.9 °C) 76 -- 102/46 65 96 % -- -- -- -- -- -- -- --    02/15/25 1330 -- 75 -- 91/44 60 96 % -- -- -- -- -- -- -- --    02/15/25 12:23:48 98 °F (36.7 °C) 77 16 116/67 83 100 % -- -- -- -- -- -- -- --    02/15/25 1117 -- -- -- -- -- -- 28 -- 2 L/min -- -- -- 14 No Pain    02/15/25 11:06:21 98 °F (36.7 °C) 72 20 120/56 77 100 % -- -- -- -- -- -- -- --    02/15/25 1015 98.1 °F (36.7 °C) 72 22 115/55 79 96 % -- -- -- -- X -- 14 No Pain    02/15/25 1000 -- 72 20 96/54 73 96 % -- -- -- -- -- -- 14 No Pain    02/15/25 0957 -- 72 21 94/48 67 95 % 28 -- 2 L/min Nasal cannula -- -- 13 No Pain    02/15/25 0946 99.3 °F (37.4 °C) 71 12 94/48 67 99 % -- 6 L/min -- Simple mask X -- -- --    02/15/25 0710 -- -- -- -- -- -- -- -- -- -- -- -- -- No Pain    02/15/25 03:30:49 98.6 °F (37 °C) 75 20 116/55 75 92 % -- -- -- -- -- -- -- --    02/15/25 0220 -- -- -- -- -- -- -- -- -- -- -- -- 14 --    02/14/25 2226 -- 69 -- -- -- 95 % -- -- -- None (Room air) -- -- -- --    02/14/25 2215 -- -- -- -- -- -- -- -- -- -- -- -- 14 --    02/14/25 2203 -- -- -- 114/54 74 -- -- -- -- -- -- -- -- 8    02/14/25 2202 99.5 °F (37.5 °C) -- -- -- -- -- -- -- -- -- -- -- -- --    02/14/25 1952 -- -- -- -- -- -- -- -- -- -- -- -- -- 8    02/14/25 1900 -- 76 17 128/61 88 98 % -- -- -- None (Room air) -- Lying -- --    02/14/25 1500 -- 84 12 132/60 86 96 % -- -- -- None (Room air) -- Lying -- --    02/14/25 1128 -- 83 -- 148/80 100 -- -- -- -- -- -- -- -- --    02/14/25 1126 98.9 °F (37.2 °C) -- 18 -- -- 99 % -- -- -- None (Room air) -- Sitting -- 8              Pertinent Labs/Diagnostic Test Results:   Radiology:  XR femur 2 vw left   Final Interpretation by Tru Greene MD (02/15 1026)      Fluoroscopy provided for procedure guidance.      Please refer to the separate  procedure note for additional details.                  Workstation performed: [id number]         XR chest portable   Final Interpretation by Alfie Borden MD (02/14 2321)      Mildly enlarged cardiomediastinal silhouette. Hypoinflation limiting motion. Bilateral perihilar hazy interstitial pulmonary infiltrates noted with lower lobe predominance. Findings may reflect atypical infectious/inflammatory process of the lung    parenchyma or interstitial pulmonary edema, recommend clinical correlation.            Workstation performed: [id number]         XR femur 2 vw left   Final Interpretation by Silvino Vicente MD (02/14 1607)      Comminuted angulated intertrochanteric femoral fracture         Computerized Assisted Algorithm (CAA) may have been used to analyze all applicable images.         Workstation performed: [id number]         CT head wo contrast   Final Interpretation by Alfie Yan MD (02/14 1402)      No acute intracranial abnormalities or significant change from priors.                        Resident: KENIA RHODES I, the attending radiologist, have reviewed the images and agree with the final report above.      Workstation performed: [id number]         XR hip/pelv 2-3 vws left if performed   Final Interpretation by Silvino Vicente MD (02/14 1521)      Acute comminuted intertrochanteric left femoral fracture         Computerized Assisted Algorithm (CAA) may have been used to analyze all applicable images.            Workstation performed: [id number]           Cardiology:  ECG 12 lead   Final Result by Edilia Pappas MD (02/15 3952)   Atrial fibrillation   Nonspecific ST and T wave abnormality , probably digitalis effect   Prolonged QT   Abnormal ECG   When compared with ECG of 17-Jan-2025 19:52,   Significant changes have occurred   Confirmed by Edilia Pappas ([id number]) on 2/15/2025 6:36:49 PM        GI:  No orders to display           Results from last 7 days   Lab Units  02/16/25  0606 02/15/25  0606 02/14/25  1529   WBC Thousand/uL 9.27 11.64* 14.52*   HEMOGLOBIN g/dL 7.0* 9.1* 9.2*   HEMATOCRIT % 22.1* 29.1* 28.5*   PLATELETS Thousands/uL 136* 162 170   TOTAL NEUT ABS Thousands/µL 6.21 7.87* 11.76*         Results from last 7 days   Lab Units 02/16/25  0606 02/15/25  0606 02/15/25  0015 02/14/25  1705 02/14/25  1529   SODIUM mmol/L 139 142 143  --  141   POTASSIUM mmol/L 3.5 3.4* 3.2*  --  2.4*   CHLORIDE mmol/L 104 105 103  --  102   CO2 mmol/L 31 29 33*  --  31   ANION GAP mmol/L 4 8 7  --  8   BUN mg/dL 20 16 15  --  15   CREATININE mg/dL 0.99 0.85 0.86  --  0.93   EGFR ml/min/1.73sq m 77 90 89  --  84   CALCIUM mg/dL 7.6* 7.8* 7.7*  --  7.8*   MAGNESIUM mg/dL  --  2.2 2.3 1.6*  --    PHOSPHORUS mg/dL  --   --   --  3.4  --          Results from last 7 days   Lab Units 02/16/25  1112 02/16/25  0806 02/15/25  2100 02/15/25  1733 02/15/25  1222 02/15/25  0948 02/15/25  0724 02/15/25  0604 02/14/25  2357 02/14/25 2001   POC GLUCOSE mg/dl 374* 316* 312* 271* 183* 146* 154* 137 129 114     Results from last 7 days   Lab Units 02/16/25  0606 02/15/25  0606 02/15/25  0015 02/14/25  1529   GLUCOSE RANDOM mg/dL 322* 141* 152* 114             Beta- Hydroxybutyrate   Date Value Ref Range Status   01/17/2025 0.23 0.02 - 0.27 mmol/L Final   08/27/2024 0.07 0.02 - 0.27 mmol/L Final                      Results from last 7 days   Lab Units 02/14/25  2236 02/14/25  1705 02/14/25  1529   HS TNI 0HR ng/L  --   --  55*   HS TNI 2HR ng/L  --  51*  --    HSTNI D2 ng/L  --  -4  --    HS TNI 4HR ng/L 49  --   --    HSTNI D4 ng/L -6  --   --          Results from last 7 days   Lab Units 02/14/25  1529   PROTIME seconds 17.3*   INR  1.39*   PTT seconds 32         Results from last 7 days   Lab Units 02/14/25  1705   PROCALCITONIN ng/ml 0.24                 Results from last 7 days   Lab Units 02/14/25  1529   BNP pg/mL 360*             Results from last 7 days   Lab Units 02/16/25  1005   UNIT PRODUCT  CODE  [id number]  [id number]   UNIT NUMBER  [id number]  [id number]   UNITABO  O  O   UNITRH  POS  POS   CROSSMATCH  Compatible  Compatible   UNIT DISPENSE STATUS  Issued  Crossmatched   UNIT PRODUCT VOL mL 350  350         Past Medical History:   Diagnosis Date    A-fib (HCC)     Adrenal insufficiency (HCC)     Atrial fibrillation (HCC)     Diabetes mellitus (HCC)     History of stroke 10/19/2022    Obesity, morbid (HCC) 11/14/2022    Shock (HCC) 12/14/2023    Stroke ([facility])     Type 2 MI (myocardial infarction) ([facility]) 11/03/2022     Present on Admission:   Hypokalemia   Hypomagnesemia   Paroxysmal A-fib (HCC)   Adrenal insufficiency (HCC)   CHF (congestive heart failure) ([facility])      Admitting Diagnosis: Hypokalemia [E87.6]  Closed fracture of left hip, initial encounter ([facility]) [S72.002A]  Unspecified multiple injuries, initial encounter [T07.XXXA]  Age/Sex: 70 y.o. male    Network Utilization Review Department  ATTENTION: Please call with any questions or concerns to [phone] and carefully listen to the prompts so that you are directed to the right person. All voicemails are confidential.   For Discharge needs, contact Care Management DC Support Team at [phone] opt. [phone]  Send all requests for admission clinical reviews, approved or denied determinations and any other requests to dedicated fax number below belonging to the campus where the patient is receiving treatment. List of dedicated fax numbers for the Facilities:  FACILITY NAME UR FAX NUMBER   ADMISSION DENIALS (Administrative/Medical Necessity) [phone]   DISCHARGE SUPPORT TEAM (NETWORK) [phone]   PARENT CHILD HEALTH (Maternity/NICU/Pediatrics) [phone]   [facility] [phone]   [facility] [phone]   [facility] [phone]   [facility] [phone]   [facility] [phone]   [facility]  [facility] [phone]   [facility] [phone]   [facility] [phone]   [facility] [phone]   [facility] [phone]   [facility] [phone]   [facility] [phone]

## 2025-02-16 NOTE — PHYSICAL THERAPY NOTE
Physical Therapy Evaluation    Patient Name: Karson You    Today's Date: 2/16/2025     Problem List  Active Problems:    Paroxysmal A-fib (HCC)    Adrenal insufficiency (HCC)    Hypokalemia    Hypomagnesemia    Closed comminuted intertrochanteric fracture of left femur (HCC)    CHF (congestive heart failure) (HCC)    Acute blood loss anemia       Past Medical History  Past Medical History:   Diagnosis Date    A-fib (HCC)     Adrenal insufficiency (HCC)     Atrial fibrillation (HCC)     Diabetes mellitus (HCC)     History of stroke 10/19/2022    Obesity, morbid (HCC) 11/14/2022    Shock (HCC) 12/14/2023    Stroke (HCC)     Type 2 MI (myocardial infarction) (HCC) 11/03/2022        Past Surgical History  History reviewed. No pertinent surgical history.      02/16/25 0842   PT Last Visit   PT Visit Date 02/16/25   Note Type   Note type Evaluation   Pain Assessment   Pain Assessment Tool 0-10   Pain Score 8   Pain Location/Orientation Orientation: Left;Location: Leg   Pain Onset/Description Onset: Ongoing   Effect of Pain on Daily Activities limited activity tolerance   Patient's Stated Pain Goal No pain   Hospital Pain Intervention(s) Repositioned;Ambulation/increased activity;Emotional support   Restrictions/Precautions   Weight Bearing Precautions Per Order Yes   LLE Weight Bearing Per Order WBAT   Other Precautions Cognitive;Chair Alarm;Bed Alarm;Multiple lines;Fall Risk;Pain   Home Living   Type of Home House  (2 SH)   Home Layout Two level;Bed/bath upstairs;1/2 bath on main level;Stairs to enter with rails  (3 RYAN)   Bathroom Shower/Tub Walk-in shower   Bathroom Toilet Standard   Bathroom Equipment Shower chair;Grab bars in shower   Bathroom Accessibility Accessible   Home Equipment Walker   Additional Comments pt is ? historian. per chart review pt lives in 2 RYAN with 3 RYAN. pt has FF to bed/bath   Prior Function   Level of [place] Independent with  ADLs;Independent with functional mobility;Independent with IADLS  (pt reports independence with ADLs, however chart review reports pt has assistance)   Lives With Family  (sister and friend)   Receives Help From Family;Friend(s);Other (Comment)  (HHA 7 days/week 0714-7933)   IADLs Independent with meal prep;Independent with medication management  (pt reports driving however chart review reports pt does not drive)   Falls in the last 6 months 1 to 4  (3 falls, obtained from chart review)   Vocational Retired   Comments pt reports independence PTA however chart review reports pt has assist with ADLs and has HHA 7 days/week from 3413-2198.   General   Family/Caregiver Present No   Cognition   Overall Cognitive Status Impaired   Arousal/Participation Arousable   Orientation Level Oriented X4   Memory Decreased recall of precautions   Following Commands Follows one step commands with increased time or repetition   Comments pt cooperative, requires increased processing time to answer questions.   Subjective   Subjective pt agreeable to mobilize   RLE Assessment   RLE Assessment X  (grossly 3+/5 with mobility)   LLE Assessment   LLE Assessment X  (grossly 2+/5 with mobility, unable to formally assess 2* to pain)   Bed Mobility   Supine to Sit 3  Moderate assistance   Additional items Assist x 2;HOB elevated;Increased time required;Verbal cues;LE management   Sit to Supine Unable to assess   Additional Comments pt OOB in chair at end of session   Transfers   Sit to Stand 3  Moderate assistance   Additional items Assist x 2;Increased time required;Verbal cues   Stand to Sit 3  Moderate assistance   Additional items Assist x 2;Increased time required;Verbal cues;Armrests   Stand pivot 3  Moderate assistance   Additional items Assist x 2;Increased time required;Verbal cues   Additional Comments c RW   Ambulation/Elevation   Gait pattern Not appropriate;Not tested;Improper Weight shift   Ambulation/Elevation Additional Comments  pt unable to weight shift and take steps at this time, attempting to pivot on feet   Balance   Static Sitting Fair   Dynamic Sitting Fair -   Static Standing Poor   Dynamic Standing Poor -   Ambulatory Zero   Endurance Deficit   Endurance Deficit Yes   Endurance Deficit Description pt limited by pain, weakness, fatigue and decreased activity tolerance   Activity Tolerance   Activity Tolerance Patient limited by fatigue;Patient limited by pain   Medical Staff Made Aware CARLA Woodson   Nurse Made Aware yes-RN cleared   Assessment   Prognosis Fair   Problem List Decreased strength;Decreased endurance;Impaired balance;Decreased mobility;Decreased cognition;Impaired judgement;Decreased safety awareness;Pain   Assessment Pt is an 70 y.o. male presenting to [facility] on 2/14/25 for primary medical dx closed intertrochanteric fracture of left femur. Pt currently POD #1 Left INSERTION NAIL IM FEMUR ANTEGRADE (TROCHANTERIC). PT is WBAT to LLE. Pt  has a past medical history of A-fib ([facility]), Adrenal insufficiency (HCC), Atrial fibrillation (HCC), Diabetes mellitus (HCC), History of stroke, Obesity, morbid ([facility]), Shock (HCC), Stroke (HCC), and Type 2 MI (myocardial infarction) ([facility]). Pt presents as a high complexity evaluation due to Ongoing medical management for primary dx, Increased reliance on more restrictive AD compared to baseline, Decreased activity tolerance compared to baseline, Fall risk, Increased assistance needed from caregiver at current time, Current WBS, Trending lab values, Continuous pulse oximetry monitoring , s/p surgical intervention. Pt currently requires mod Ax2 for bed mobility, mod Ax2 for transfers with RW, and mod Ax2 for stand pivot to chair with RW. Pt unable to weight shift at this time to take steps. Pt is limited by pain and deficits in strength, balance, endurance, activity tolerance and mobility limiting their ability to safely access their home/community. Pt would benefit from continued skilled acute  care PT services to address impairments and promote functional independence. Recommend level 2 resources to improve mobility and promote PLOF. The patient's AM-PAC Basic Mobility Inpatient Short Form Raw Score is 8. A Raw score of less than 16 suggests the patient may benefit from discharge to post-acute rehabilitation services. Please also refer to the recommendation of the Physical Therapist for safe discharge planning. Pt left upright in chair with chair alarm donned, call bell and personal items within reach and all needs met.   Barriers to Discharge Inaccessible home environment;Decreased caregiver support   Goals   Patient Goals none stated   [facility] Expiration Date 03/02/25   Short Term Goal #1 In 14 days pt will complete bed mobility at mod I to decrease caregiver burden and increase independence. Pt will complete transfers at mod I to increase independence and safety. Pt will ambulate 250' with LRAD to promote safe access to home and community. Pt will negotiate 12 steps with S to promote safe access to home and community. Pt will improve b/l LE strength to improve efficiency of transfers and ambulation. Pt will improve balance by 1/2 grade to decrease risk of falls and increase safety.   PT Treatment Day 0   Plan   Treatment/Interventions Functional transfer training;LE strengthening/ROM;Elevations;Therapeutic exercise;Endurance training;Cognitive reorientation;Patient/family training;Equipment eval/education;Bed mobility;Gait training;ADL retraining;Compensatory technique education;Continued evaluation;Spoke to nursing;OT   PT Frequency 3-5x/wk   Discharge Recommendation   Rehab Resource Intensity Level, PT II (Moderate Resource Intensity)   Equipment Recommended Walker   Walker Package Recommended Wheeled walker   AM-PAC Basic Mobility Inpatient   Turning in Flat Bed Without Bedrails 2   Lying on Back to Sitting on Edge of Flat Bed Without Bedrails 2   Moving Bed to Chair 1   Standing Up From Chair Using  Arms 1   Walk in Room 1   Climb 3-5 Stairs With Railing 1   Basic Mobility Inpatient Raw Score 8   Turning Head Towards Sound 3   Follow Simple Instructions 2   Low Function Basic Mobility Raw Score  13   Low Function Basic Mobility Standardized Score  20.14   [facility] Highest Level Of Mobility   -[facility] Goal 3: Sit at edge of bed   -HLM Achieved 4: Move to chair/commode   Modified Teri Scale   Modified [place] Scale 4   NICK GrahamT

## 2025-02-16 NOTE — CASE MANAGEMENT
Case Management Discharge Planning Note    Patient name Karson You  Location [facility] 825/[facility] 825-01 MRN [id number]  : 1955 Date 2025       Current Admission Date: 2025  Current Admission Diagnosis:Closed comminuted intertrochanteric fracture of left femur ([facility])   Patient Active Problem List    Diagnosis Date Noted Date Diagnosed    Acute blood loss anemia 2025     CHF (congestive heart failure) ([facility]) 02/15/2025     Closed comminuted intertrochanteric fracture of left femur ([facility]) 2025     Chronic kidney disease, stage 5 ([facility]) 2025     Acute on chronic heart failure with preserved ejection fraction (HFpEF) ([facility]) 2025     Leukocytosis 2025     Type 1 diabetes mellitus with hyperglycemia ([facility]) 2024     PAD (peripheral artery disease) ([facility]) 2024     Stage 2 chronic kidney disease 2024     Mixed hyperlipidemia 2024     Morbid (severe) obesity due to excess calories ([facility]) 2024     Vitamin D deficiency 2024     Chronic diastolic heart failure ([facility]) 2023     Prolonged Q-T interval on ECG 2023     Hypokalemia 2023     Hypomagnesemia 2023     Fall 11/10/2023     Adrenal insufficiency ([facility]) 11/10/2023     Orthostatic hypotension 11/10/2023     History of CVA (cerebrovascular accident) 11/10/2023     Spells of decreased attentiveness 2023     Iron deficiency anemia, unspecified 2023     Syncope 2023     Unintentional weight loss 2022     Paroxysmal A-fib ([facility]) 10/19/2022     Anemia of chronic disease 10/19/2022     Psychiatric disorder 10/19/2022       LOS (days): 2  Geometric Mean LOS (GMLOS) (days):   Days to GMLOS:     OBJECTIVE:  Risk of Unplanned Readmission Score: 35.73         Current admission status: Inpatient   Preferred Pharmacy:   [facility] [place] - [address]  [address]  [place] [address]  Phone: [phone] Fax:  [phone]    [id number] [place] - [facility]  [place] [address]  Phone: [phone] Fax: [phone]    [facility] - [place] - [address] [address] [address] [facility] [place] [address]  Phone: [phone] Fax: [phone]    [id number] [place] [address] [place] [address]  [place] [address]  Phone: [phone] Fax: [phone]    Primary Care Provider: NACHO Kenney    Primary Insurance: [facility]  Secondary Insurance:     DISCHARGE DETAILS:        STR referral sent to Michael Salmeron  Pt will require auth

## 2025-02-16 NOTE — PLAN OF CARE
Problem: PHYSICAL THERAPY ADULT  Goal: Performs mobility at highest level of function for planned discharge setting.  See evaluation for individualized goals.  Description: Treatment/Interventions: Functional transfer training, LE strengthening/ROM, Elevations, Therapeutic exercise, Endurance training, Cognitive reorientation, Patient/family training, Equipment eval/education, Bed mobility, Gait training, ADL retraining, Compensatory technique education, Continued evaluation, Spoke to nursing, OT  Equipment Recommended: Walker       See flowsheet documentation for full assessment, interventions and recommendations.  Note: Prognosis: Fair  Problem List: Decreased strength, Decreased endurance, Impaired balance, Decreased mobility, Decreased cognition, Impaired judgement, Decreased safety awareness, Pain  Assessment: Pt is an 70 y.o. male presenting to [facility] on 2/14/25 for primary medical dx closed intertrochanteric fracture of left femur. Pt currently POD #1 Left INSERTION NAIL IM FEMUR ANTEGRADE (TROCHANTERIC). PT is WBAT to LLE. Pt  has a past medical history of A-fib ([facility]), Adrenal insufficiency ([facility]), Atrial fibrillation ([facility]), Diabetes mellitus ([facility]), History of stroke, Obesity, morbid ([facility]), Shock ([facility]), Stroke ([facility]), and Type 2 MI (myocardial infarction) ([facility]). Pt presents as a high complexity evaluation due to Ongoing medical management for primary dx, Increased reliance on more restrictive AD compared to baseline, Decreased activity tolerance compared to baseline, Fall risk, Increased assistance needed from caregiver at current time, Current WBS, Trending lab values, Continuous pulse oximetry monitoring , s/p surgical intervention. Pt currently requires mod Ax2 for bed mobility, mod Ax2 for transfers with RW, and mod Ax2 for stand pivot to chair with RW. Pt unable to weight shift at this time to take steps. Pt is limited by pain and deficits in strength, balance, endurance, activity tolerance and mobility  limiting their ability to safely access their home/community. Pt would benefit from continued skilled acute care PT services to address impairments and promote functional independence. Recommend level 2 resources to improve mobility and promote PLOF. The patient's AM-PAC Basic Mobility Inpatient Short Form Raw Score is 8. A Raw score of less than 16 suggests the patient may benefit from discharge to post-acute rehabilitation services. Please also refer to the recommendation of the Physical Therapist for safe discharge planning. Pt left upright in chair with chair alarm donned, call bell and personal items within reach and all needs met.  Barriers to Discharge: Inaccessible home environment, Decreased caregiver support     Rehab Resource Intensity Level, PT: II (Moderate Resource Intensity)    See flowsheet documentation for full assessment.

## 2025-02-17 LAB
ABO GROUP BLD BPU: NORMAL
ABO GROUP BLD BPU: NORMAL
ANION GAP SERPL CALCULATED.3IONS-SCNC: 5 MMOL/L (ref 4–13)
BASOPHILS # BLD AUTO: 0.01 THOUSANDS/ΜL (ref 0–0.1)
BASOPHILS NFR BLD AUTO: 0 % (ref 0–1)
BPU ID: NORMAL
BPU ID: NORMAL
BUN SERPL-MCNC: 19 MG/DL (ref 5–25)
CALCIUM SERPL-MCNC: 7.9 MG/DL (ref 8.4–10.2)
CARDIAC TROPONIN I PNL SERPL HS: 23 NG/L (ref 8–18)
CHLORIDE SERPL-SCNC: 102 MMOL/L (ref 96–108)
CO2 SERPL-SCNC: 31 MMOL/L (ref 21–32)
CREAT SERPL-MCNC: 0.92 MG/DL (ref 0.6–1.3)
CROSSMATCH: NORMAL
CROSSMATCH: NORMAL
EOSINOPHIL # BLD AUTO: 0.15 THOUSAND/ΜL (ref 0–0.61)
EOSINOPHIL NFR BLD AUTO: 2 % (ref 0–6)
ERYTHROCYTE [DISTWIDTH] IN BLOOD BY AUTOMATED COUNT: 15.7 % (ref 11.6–15.1)
GFR SERPL CREATININE-BSD FRML MDRD: 85 ML/MIN/1.73SQ M
GLUCOSE SERPL-MCNC: 104 MG/DL (ref 65–140)
GLUCOSE SERPL-MCNC: 124 MG/DL (ref 65–140)
GLUCOSE SERPL-MCNC: 143 MG/DL (ref 65–140)
GLUCOSE SERPL-MCNC: 145 MG/DL (ref 65–140)
GLUCOSE SERPL-MCNC: 83 MG/DL (ref 65–140)
GLUCOSE SERPL-MCNC: 92 MG/DL (ref 65–140)
HCT VFR BLD AUTO: 25.2 % (ref 36.5–49.3)
HGB BLD-MCNC: 8.1 G/DL (ref 12–17)
IMM GRANULOCYTES # BLD AUTO: 0.05 THOUSAND/UL (ref 0–0.2)
IMM GRANULOCYTES NFR BLD AUTO: 1 % (ref 0–2)
LYMPHOCYTES # BLD AUTO: 1.91 THOUSANDS/ΜL (ref 0.6–4.47)
LYMPHOCYTES NFR BLD AUTO: 21 % (ref 14–44)
MCH RBC QN AUTO: 30.8 PG (ref 26.8–34.3)
MCHC RBC AUTO-ENTMCNC: 32.1 G/DL (ref 31.4–37.4)
MCV RBC AUTO: 96 FL (ref 82–98)
MONOCYTES # BLD AUTO: 0.81 THOUSAND/ΜL (ref 0.17–1.22)
MONOCYTES NFR BLD AUTO: 9 % (ref 4–12)
NEUTROPHILS # BLD AUTO: 6.31 THOUSANDS/ΜL (ref 1.85–7.62)
NEUTS SEG NFR BLD AUTO: 67 % (ref 43–75)
NRBC BLD AUTO-RTO: 0 /100 WBCS
PLATELET # BLD AUTO: 163 THOUSANDS/UL (ref 149–390)
PMV BLD AUTO: 10.6 FL (ref 8.9–12.7)
POTASSIUM SERPL-SCNC: 3.2 MMOL/L (ref 3.5–5.3)
RBC # BLD AUTO: 2.63 MILLION/UL (ref 3.88–5.62)
SODIUM SERPL-SCNC: 138 MMOL/L (ref 135–147)
UNIT DISPENSE STATUS: NORMAL
UNIT DISPENSE STATUS: NORMAL
UNIT PRODUCT CODE: NORMAL
UNIT PRODUCT CODE: NORMAL
UNIT PRODUCT VOLUME: 350 ML
UNIT PRODUCT VOLUME: 350 ML
UNIT RH: NORMAL
UNIT RH: NORMAL
WBC # BLD AUTO: 9.24 THOUSAND/UL (ref 4.31–10.16)

## 2025-02-17 PROCEDURE — 82948 REAGENT STRIP/BLOOD GLUCOSE: CPT

## 2025-02-17 PROCEDURE — 99223 1ST HOSP IP/OBS HIGH 75: CPT | Performed by: INTERNAL MEDICINE

## 2025-02-17 PROCEDURE — 85025 COMPLETE CBC W/AUTO DIFF WBC: CPT

## 2025-02-17 PROCEDURE — 80048 BASIC METABOLIC PNL TOTAL CA: CPT

## 2025-02-17 PROCEDURE — 84484 ASSAY OF TROPONIN QUANT: CPT

## 2025-02-17 PROCEDURE — NC001 PR NO CHARGE: Performed by: ORTHOPAEDIC SURGERY

## 2025-02-17 RX ORDER — POTASSIUM CHLORIDE 14.9 MG/ML
20 INJECTION INTRAVENOUS
Status: COMPLETED | OUTPATIENT
Start: 2025-02-17 | End: 2025-02-17

## 2025-02-17 RX ADMIN — SENNOSIDES AND DOCUSATE SODIUM 1 TABLET: 50; 8.6 TABLET ORAL at 21:40

## 2025-02-17 RX ADMIN — INSULIN LISPRO 4 UNITS: 100 INJECTION, SOLUTION INTRAVENOUS; SUBCUTANEOUS at 08:27

## 2025-02-17 RX ADMIN — INSULIN GLARGINE 12 UNITS: 100 INJECTION, SOLUTION SUBCUTANEOUS at 21:40

## 2025-02-17 RX ADMIN — HYDROCORTISONE 5 MG: 5 TABLET ORAL at 08:30

## 2025-02-17 RX ADMIN — INSULIN LISPRO 4 UNITS: 100 INJECTION, SOLUTION INTRAVENOUS; SUBCUTANEOUS at 12:03

## 2025-02-17 RX ADMIN — ACETAMINOPHEN 975 MG: 325 TABLET, FILM COATED ORAL at 21:40

## 2025-02-17 RX ADMIN — ACETAMINOPHEN 975 MG: 325 TABLET, FILM COATED ORAL at 13:35

## 2025-02-17 RX ADMIN — APIXABAN 5 MG: 5 TABLET, FILM COATED ORAL at 17:56

## 2025-02-17 RX ADMIN — ASPIRIN 81 MG CHEWABLE TABLET 81 MG: 81 TABLET CHEWABLE at 08:31

## 2025-02-17 RX ADMIN — POTASSIUM CHLORIDE 20 MEQ: 14.9 INJECTION, SOLUTION INTRAVENOUS at 13:35

## 2025-02-17 RX ADMIN — FLUDROCORTISONE ACETATE 0.2 MG: 0.1 TABLET ORAL at 08:31

## 2025-02-17 RX ADMIN — ATORVASTATIN CALCIUM 10 MG: 10 TABLET, FILM COATED ORAL at 08:31

## 2025-02-17 RX ADMIN — ACETAMINOPHEN 975 MG: 325 TABLET, FILM COATED ORAL at 05:02

## 2025-02-17 RX ADMIN — APIXABAN 5 MG: 5 TABLET, FILM COATED ORAL at 08:31

## 2025-02-17 RX ADMIN — POTASSIUM CHLORIDE 20 MEQ: 14.9 INJECTION, SOLUTION INTRAVENOUS at 11:54

## 2025-02-17 RX ADMIN — SERTRALINE 100 MG: 100 TABLET, FILM COATED ORAL at 08:30

## 2025-02-17 NOTE — ASSESSMENT & PLAN NOTE
Assessment:    70 y.o. male s/p Left INSERTION NAIL IM FEMUR ANTEGRADE (TROCHANTERIC) on 2/15. Doing well postoperatively.      Plan:  WBAT LLE  PT/OT  Pain control  DVT ppx: eliquis  Will monitor for ABLA and administer IVF/prbc as indicated for Greater than 2 gram drop or Hgb < 7

## 2025-02-17 NOTE — CASE MANAGEMENT
Case Management Discharge Planning Note    Patient name Karson You  Location [facility] 825/[facility] 825-01 MRN [id number]  : 1955 Date 2025       Current Admission Date: 2025  Current Admission Diagnosis:Closed comminuted intertrochanteric fracture of left femur (HCC)   Patient Active Problem List    Diagnosis Date Noted Date Diagnosed    Acute blood loss anemia 2025     CHF (congestive heart failure) ([facility]) 02/15/2025     Closed comminuted intertrochanteric fracture of left femur ([facility]) 2025     Chronic kidney disease, stage 5 ([facility]) 2025     Acute on chronic heart failure with preserved ejection fraction (HFpEF) ([facility]) 2025     Leukocytosis 2025     Type 1 diabetes mellitus with hyperglycemia ([facility]) 2024     PAD (peripheral artery disease) ([facility]) 2024     Stage 2 chronic kidney disease 2024     Mixed hyperlipidemia 2024     Morbid (severe) obesity due to excess calories ([facility]) 2024     Vitamin D deficiency 2024     Chronic diastolic heart failure ([facility]) 2023     Prolonged Q-T interval on ECG 2023     Hypokalemia 2023     Hypomagnesemia 2023     Fall 11/10/2023     Adrenal insufficiency ([facility]) 11/10/2023     Orthostatic hypotension 11/10/2023     History of CVA (cerebrovascular accident) 11/10/2023     Spells of decreased attentiveness 2023     Iron deficiency anemia, unspecified 2023     Syncope 2023     Unintentional weight loss 2022     Paroxysmal A-fib ([facility]) 10/19/2022     Anemia of chronic disease 10/19/2022     Psychiatric disorder 10/19/2022       LOS (days): 3  Geometric Mean LOS (GMLOS) (days): 4.4  Days to GMLOS:1.5     OBJECTIVE:  Risk of Unplanned Readmission Score: 32.93         Current admission status: Inpatient   Preferred Pharmacy:   [facility] [place] - [place] - [address]  [address]  [place] [address]  Phone: [phone] Fax:  [phone]    [id number] [place] - [address]  [place] [address]  Phone: [phone] Fax: [phone]    [facility] [place] - [address] [facility]  [address] [facility] [place] [address]  Phone: [phone] Fax: [phone]    [id number] [place] [address]  [place] [address]  Phone: [phone] Fax: [phone]    Primary Care Provider: NACHO Kenney    Primary Insurance: [employer] [facility]  Secondary Insurance:     DISCHARGE DETAILS:      Other Referral/Resources/Interventions Provided:  Interventions: Short Term Rehab      Treatment Team Recommendation: Short Term Rehab  Discharge Destination Plan:: Short Term Rehab    IMM Given (Date):: 02/17/25  IMM Given to:: Family  Family notified:: Dtr       CM spoke to pt's dtr regarding d/c plan  She's in agreement with d/c tomorrow

## 2025-02-17 NOTE — PROGRESS NOTES
Progress Note - Orthopedics   Name: Karson You 70 y.o. male I MRN: [id number]  Unit/Bed#: [facility] 825-01 I Date of Admission: 2/14/2025   Date of Service: 2/17/2025 I Hospital Day: 3    Assessment & Plan  Closed comminuted intertrochanteric fracture of left femur (HCC)  Assessment:    70 y.o. male s/p Left INSERTION NAIL IM FEMUR ANTEGRADE (TROCHANTERIC) on 2/15. Doing well postoperatively.      Plan:  WBAT LLE  PT/OT  Pain control  DVT ppx: eliquis  Will monitor for ABLA and administer IVF/prbc as indicated for Greater than 2 gram drop or Hgb < 7         Acute blood loss anemia      Please contact the SecureChat role for the Orthopedics service with any questions/concerns.    History of Present Illness   70 y.o. male No acute events, no acute distress. Denies fever/chills, SOB. Pain well controlled.     Objective      Temp:  [97.8 °F (36.6 °C)-99.9 °F (37.7 °C)] 99.9 °F (37.7 °C)  HR:  [69-75] 74  BP: ()/(49-65) 131/65  Resp:  [16-20] 16  SpO2:  [94 %-98 %] 97 %  O2 Device: None (Room air)  O2 Device: None (Room air)          I/O         02/15 0701  02/16 0700 02/16 0701  02/17 0700    P.O. 220     Blood  390    IV Piggyback 100     Total Intake(mL/kg) 320 (3.7) 390 (4.6)    Urine (mL/kg/hr) 0 (0) 750 (0.4)    Blood 100     Total Output 100 750    Net +220 -360          Unmeasured Urine Occurrence 2 x 1 x            Physical Exam   Musculoskeletal: Left Lower Extremity  Dressing C/D/I  TTP nadia-incisionally  Sensation intact to light touch nedra/saph/sp/dp/tib  Motor intact EHL/FHL, ankle DF/PF  Digits warm well perfused with brisk cap refill  No calf swelling or ttp      Lab Results: I have reviewed the following results:  Recent Labs     02/14/25  1529 02/15/25  0015 02/15/25  0606 02/16/25  0606 02/16/25  1525   WBC 14.52*  --  11.64* 9.27  --    HGB 9.2*  --  9.1* 7.0* 8.1*   HCT 28.5*  --  29.1* 22.1* 25.8*     --  162 136*  --    BUN 15 15 16 20  --    CREATININE 0.93 0.86 0.85 0.99  --    PTT 32   "--   --   --   --    INR 1.39*  --   --   --   --      Blood Culture:    Lab Results   Component Value Date    BLOODCX Staphylococcus epidermidis (A) 01/07/2025     Wound Culture: No results found for: \"WOUNDCULT\"      "

## 2025-02-17 NOTE — DISCHARGE INSTR - AVS FIRST PAGE
Discharge Instructions - Orthopedics  Karson You 70 y.o. male MRN: [id number]  Unit/Bed#: [facility] 825-01    Weight Bearing Status:                                           You may bear weight as tolerated on your Left Lower Extremity.    DVT prophylaxis:  Continue your Eliquis    Pain:  Continue pain medications as needed.    Dressing Instructions:   Please keep clean, dry and intact until follow up.    Appt Instructions:   If you do not have your appointment, please call the clinic at [phone]  Otherwise follow up as scheduled/instructed.    Contact the office sooner if you experience any increased numbness/tingling in the extremities.\

## 2025-02-17 NOTE — CASE MANAGEMENT
Case Management Discharge Planning Note    Patient name Karson You  Location [facility] 825/[facility] 825-01 MRN [id number]  : 1955 Date 2025       Current Admission Date: 2025  Current Admission Diagnosis:Closed comminuted intertrochanteric fracture of left femur (HCC)   Patient Active Problem List    Diagnosis Date Noted Date Diagnosed    Acute blood loss anemia 2025     CHF (congestive heart failure) ([facility]) 02/15/2025     Closed comminuted intertrochanteric fracture of left femur ([facility]) 2025     Chronic kidney disease, stage 5 ([facility]) 2025     Acute on chronic heart failure with preserved ejection fraction (HFpEF) ([facility]) 2025     Leukocytosis 2025     Type 1 diabetes mellitus with hyperglycemia ([facility]) 2024     PAD (peripheral artery disease) ([facility]) 2024     Stage 2 chronic kidney disease 2024     Mixed hyperlipidemia 2024     Morbid (severe) obesity due to excess calories ([facility]) 2024     Vitamin D deficiency 2024     Chronic diastolic heart failure ([facility]) 2023     Prolonged Q-T interval on ECG 2023     Hypokalemia 2023     Hypomagnesemia 2023     Fall 11/10/2023     Adrenal insufficiency ([facility]) 11/10/2023     Orthostatic hypotension 11/10/2023     History of CVA (cerebrovascular accident) 11/10/2023     Spells of decreased attentiveness 2023     Iron deficiency anemia, unspecified 2023     Syncope 2023     Unintentional weight loss 2022     Paroxysmal A-fib ([facility]) 10/19/2022     Anemia of chronic disease 10/19/2022     Psychiatric disorder 10/19/2022       LOS (days): 3  Geometric Mean LOS (GMLOS) (days): 4.4  Days to GMLOS:1.5     OBJECTIVE:  Risk of Unplanned Readmission Score: 35.56         Current admission status: Inpatient   Preferred Pharmacy:   [facility] [place] - [address]  [address]  [place] [address]  Phone: [phone] Fax:  [phone]    [id number] [place] - [facility]  [place] [address]  Phone: [phone] Fax: [phone]    [facility] [place] [facility] [address] [address] [address] [facility] [place] [address]  Phone: [phone] Fax: [phone]    [id number] [place] - [address] [place] [address]  [place] [address]  Phone: [phone] Fax: [phone]    Primary Care Provider: NACHO Kenney    Primary Insurance: [facility] [facility]  Secondary Insurance:     DISCHARGE DETAILS:                                                                                                               Facility Insurance Auth Number: [id number]

## 2025-02-17 NOTE — CASE MANAGEMENT
[facility] has received APPROVED authorization.  Insurance:   [employer]   Auth obtained via portal.  Authorization received for: SNF  Facility: [facility]   Authorization #: [id number]   Start of Care: 2/17  Next Review Date: 2/19  Continued Stay Care Coordinator: n/a  Submit next review to: [phone]     Care Manager notified: Will A    Please reach out to CM for updates on any clinical information.

## 2025-02-17 NOTE — PROGRESS NOTES
Progress Note - Trauma   Name: Karson You 70 y.o. male I MRN: [id number]  [facility] Date of Admission: 2/14/2025   Date of Service: 2/17/2025 I Hospital Day: 3    Assessment & Plan  Closed comminuted intertrochanteric fracture of left femur (HCC)  -Ortho consulted, appreciate recs  -POD 2 Left INSERTION NAIL IM FEMUR ANTEGRADE (TROCHANTERIC) doing well postoperatively.   -NWB LLE  -PT/OT as indicated  -Multimodal pain regimen  -DVT ppx: okay per ortho, resume home eliquis  -Will monitor for ABLA and administer IVF/prbc as indicated for Greater than 2 gram drop or Hgb < 7   Acute blood loss anemia  Hgb 8.1 from 8.1  Continue to monitor H&H  Hypokalemia  Chronic, in setting of adrenal insufficiency and loop diuretics  Initial K of 2.4  S/p repletion with IV and PO potassium  Follow up am BMP    Bowel Regimen: Senokot  VTE Prophylaxis:VTE covered by:  apixaban, Oral, 5 mg at 02/17/25 0831        Disposition: Short-term rehab referrals pending authorization. Please contact the SecureChat role for the Trauma service with any questions/concerns.    24 Hour Events : Transfuse 1 RBC for hemoglobin drop.  Posttransfusion 8.1.  This morning 8.1.  Subjective : Patient reports pain is much better.  No nausea vomiting fevers chills chest pain shortness of breath.    Objective :  Temp:  [97.8 °F (36.6 °C)-99.9 °F (37.7 °C)] 99.1 °F (37.3 °C)  HR:  [69-75] 74  BP: ()/(49-65) 130/64  Resp:  [16-20] 16  SpO2:  [94 %-98 %] 97 %  O2 Device: None (Room air)    I/O         02/15 0701 02/16 0700 02/16 0701 02/17 0700 02/17 0701 02/18 0700    P.O. 220      Blood  390     IV Piggyback 100      Total Intake(mL/kg) 320 (3.7) 390 (4.6)     Urine (mL/kg/hr) 0 (0) 750 (0.4)     Blood 100      Total Output 100 750     Net +220 -360            Unmeasured Urine Occurrence 2 x 1 x             Physical Exam  Vitals and nursing note reviewed.   Constitutional:       General: He is not in acute distress.  HENT:      Head:  Normocephalic and atraumatic.      Right Ear: External ear normal.      Left Ear: External ear normal.   Eyes:      Conjunctiva/sclera: Conjunctivae normal.   Cardiovascular:      Rate and Rhythm: Normal rate.   Pulmonary:      Effort: Pulmonary effort is normal. No respiratory distress.   Musculoskeletal:      Comments: Left femur dressing clean dry intact, nontender, compartments soft   Neurological:      Mental Status: He is alert.               Lab Results: I have reviewed the following results:  Recent Labs     02/14/25  1529 02/14/25  1705 02/15/25  0015 02/15/25  0606 02/16/25  0606 02/16/25  1525   WBC 14.52*  --   --  11.64* 9.27  --    HGB 9.2*  --   --  9.1* 7.0* 8.1*   HCT 28.5*  --   --  29.1* 22.1* 25.8*     --   --  162 136*  --    SODIUM 141  --    < > 142 139  --    K 2.4*  --    < > 3.4* 3.5  --      --    < > 105 104  --    CO2 31  --    < > 29 31  --    BUN 15  --    < > 16 20  --    CREATININE 0.93  --    < > 0.85 0.99  --    GLUC 114  --    < > 141* 322*  --    MG  --  1.6*   < > 2.2  --   --    PHOS  --  3.4  --   --   --   --    PTT 32  --   --   --   --   --    INR 1.39*  --   --   --   --   --    HSTNI0 55*  --   --   --   --   --    HSTNI2  --  51*  --   --   --   --    *  --   --   --   --   --     < > = values in this interval not displayed.       Imaging Results Review: No pertinent imaging studies reviewed.  Other Study Results Review: No additional pertinent studies reviewed.

## 2025-02-17 NOTE — CONSULTS
Consultation - Geriatric Medicine   Karson You 70 y.o. male MRN: [id number]  [facility] Encounter: [id number]      Assessment & Plan     Ambulatory dysfunction with fall  -reportedly mechanical fall at home earlier on day of admission   -(-) head strike (-) loss of consciousness  -injuries as outlined below  -Requires use of walker for ambulation at baseline  -hx recurrent falls with at least three additional reportedly recently   -remains high risk future falls due to age, hx of prior falls, deconditioning/debility and unfamiliar environment   -encourage good body mechanics and assist with all transfers  -keep personal items and call bell close to prevent reaching  -maintain environment free of fall hazards  -encourage appropriate footwear and adequate lighting at all times when out of bed  -recommend home fall risk assessment and personal fall alert system if returning home  -PT and OT following     Closed communicated intertrochanteric fracture of left femur  -s/p fall as outlined above   -noted on XR hip/pelvis obtained on admission   -s/p IMN with Ortho on 2/15/25  -continue acute multimodal pain control   -monitor for ongoing acute blood loss anemia and transfuse as indicated   -neurovascular checks per protocol   -PT/OT following, recommended level II, moderate resource intensity   -CM following for assist with dispo, tentative for rehab on hosp d/c    Acute pain due to trauma  -continue pain control per Geriatric pain protocol:  Tylenol 975mg Q8H scheduled  Roxicodone 2.5mg Q4H PRN moderate pain  Roxicodone 5mg Q4H PRN severe pain  Dilaudid 0.2mg Q2H PRN  -consider adjuncts such as lidocaine patch topically to appropriate areas  -encourage addition of non-pharmacologic pain treatment including ice and frequent repositioning as indicated   -continue bowel regimen to prevent constipation due to increased risk with acute pain and opiate pain medications    Acute blood loss anemia  -evidenced by  >2g drop in Hb from 9.2 to 7.0  -improved to and remaining stable at 8.1 s/p PRBC transfusion   -continue to monitor for ongoing acute blood loss anemia and transfuse as indicated     Hypokalemia   -[K] low at 3.2 this morning   -home lasix temp on hold and KCL supplementation pending   -monitor electrolytes closely and continue to replete as indicated     Paroxysmal afib  -appears to be managed off rate controllers as o/p maintained on chronic systemic a/c with Eliquis  -rates appear to currently be well controlled  -continue close o/p f/u with PCP and Cardiology for ongoing management    DM-I  -A1c slightly elevated at 8.3  -maintained on basal bolus insulin regimen as outpatient as outlined below  -encourage well balanced diet and healthy lifestyle modifications   -goal glu during hospitalization 140-180 to reduce risk hypoglycemia   -follows with Endo as o/p, continue close o/p f/u with Endo for ongoing management and age appropriate diabetic screenings and cares     Chronic diastolic CHF  -EF 60% by by echo 12/2023  -maintained on lasix and KCL chronically as o/p  -monitor electrolytes, volume status and daily weights closely  -continue close o/p f/u with Cardiology for ongoing management     Adrenocortical insufficiency   -maintained on florinef, hydrocortisone and midodrine PRN as o/p  -continue home regimen and optimization of hemodynamics  -close close o/p f/u with Endo for ongoing management     CKD-V  -continue to monitor renal function closely  -continue optimization of hemodynamics   -renally dose all medications and avoid nephrotoxins   -continue close o/p f/u with PCP for ongoing management     Cognitive screening   -awake and alert answering basic ques appropriately, denies memory or cognitive concerns   -at baseline reportedly resides home and requires some assist with ADLs and iADLs  -MoCA 21/30 (6/30/21) with Psychiatry on screening for capacity during hospitalization at [facility], no repeat on record  for review, consider as o/p following recovery from acute injuries  -CTH obtained on admission imaging personally viewed, reveals some generalized volume loss and findings consistent with at least mild to moderate diffuse chronic microangiopathic changes   -TSH WNL at 0.828, B12   -at risk age and cardiovascular related cognitive impairment, continue secondary risk factor modification   -encourage patient remain physically, socially, cognitively active and engaged to maintain cognitive acuity  -Encourage use of assistive devices such as corrective lenses at all appropriate times to reduce risk of uncorrected center impairment from taya to isolation, confusion, encephalopathy and more precipitous cognitive decline    Major depressive disorder   -symptoms appear to be well controlled on home Zoloft regimen, continue home dosing   -good psychosocial support of family   -consider o/p referral to counseling/support group/Psychiatry as o/p if not already established   -continue psychosocial supports    -close o/p f/u with PCP for ongoing management     Insomnia   -reportedly acute during hospitalization   -encourage good sleep hygiene and establishment of bedtime routine as possible  -ensure acute pain controlled  -encourage sleep promoting environment in evenings and limit daytime napping   -start low dose melatonin 3mg HS PRN for sleep support     Impaired Vision  -recommend use of corrective lenses at all appropriate times  -encourage adequate lighting and encourage use of assistance with ambulation  -keep personal belongings close to person to avoid reaching  -encourage appropriate footwear at all times  -consider large font for printed materials provided to patient     Deconditioning/debility/frailty   -clinical frailty scale stage V/VI, moderately frail, progressive  -continue optimization of chronic conditions and address acute metabolic derangements as arise  -Continue to encourage well-balanced  nutritional intake  -Ensure underlying anxiety/mood/depression symptoms are well-controlled as may impact patient response to therapies as well as avoidance of wellbeing and quality of life  -Continue to ensure that treatments and interventions align the patient's wishes and goals of care  -Continue psychosocial supports of patient and caregivers    Delirium precautions  -Patient is high risk of delirium due to age, fall, traumatic injuries, acute pain, hospitalization   -continue delirium precautions   -maintain normal sleep/wake cycle  -minimize overnight interruptions, group overnight vitals/labs/nursing checks as possible  -dim lights, close blinds and turn off tv to minimize stimulation and encourage sleep environment in evenings  -ensure that acute pain is well controlled  -monitor for fecal and urinary retention which may precipitate delirium  -encourage early mobilization and ambulation with assist as cleared to safely do so  -provide frequent reorientation and redirection as indicated and appropriate   -encourage family and friends at the bedside to help help calm patient if anxious  -avoid medications which may precipitate or worsen delirium such as tramadol, benzodiazepine, anticholinergics, and benadryl as possible  -encourage hydration and nutrition   -redirect unwanted behaviors as first line    Home medication review     Aspirin 81 Mg daily  Eliquis 5 Mg twice daily  Lipitor 10 Mg daily  Florinef 0.1 Mg 2 tablets daily  Lasix 20 Mg daily  Hydrocortisone 5 Mg take 4 tablets in morning and 3 tablets in afternoon  Toujeo max Solostar 300U/ml 18 units HS  Insulin lispro 6 units 3 times daily with meals  KCl 10 mEq daily  Midodrine 5 Mg 3 times daily before meals as needed  Zoloft 100 Mg daily    Care coordination: rounded with Cristel (RN)    History of Present Illness   Physician Requesting Consult: Alejo Jackson MD  Reason for Consult / Principal Problem: Fall   Hx and PE limited by: N/A  Additional  history obtained from: Chart review and patient evaluation    HPI: Karson You is a 70 y.o. year old male with paroxysmal A-fib, adrenal insufficiency, DM-I, history of prolonged QTc, PAD, orthostatic hypotension, hyperlipidemia, history of CVA, CKD-V, chronic diastolic heart failure, and psychiatric disorder who is admitted to the Trauma service with ambulatory dysfunction and fall found to have left intertrochanter femur fracture on admission imaging, he has since undergone surgical fixation with Orthopedics and is being seen in consultation by Geriatrics for high risk developing delirium during hospitalization. Karson is seen and examined at bedside where he is sitting resting, he explains that he sustained a mechanical fall earlier on day of admission which occurred when he fell getting out of bed, no head strike or loss of consciousness reported. He noted immediate left leg pain and inability to bear weight. He notes pain is better controlled with medication regimen since admission and denies other acute complaints reporting being otherwise in his usual state of health.     Prior to admission Karson was residing home with family and requiring some assist with ADLs and iADLs and himself denies memory or cognitive concerns. He requires use of walker for ambulation at baseline with reported history of recurrent falls. He requires use of glasses, denies use of hearing aid or dentures.     Inpatient consult to Gerontology  Consult performed by: Alina Rocha DO  Consult ordered by: Lauren Desai MD        Review of Systems   Constitutional: Negative.  Negative for appetite change, chills and fever.   HENT: Negative.     Eyes:  Positive for visual disturbance (wears glasses).   Respiratory: Negative.  Negative for shortness of breath.    Cardiovascular: Negative.  Negative for chest pain and palpitations.   Gastrointestinal: Negative.  Negative for abdominal distention.   Genitourinary: Negative.     Musculoskeletal:  Positive for gait problem.        Left leg pain currently well controlled    Skin: Negative.    Neurological:  Negative for dizziness, weakness, light-headedness, numbness and headaches.   Hematological: Negative.    Psychiatric/Behavioral:  Positive for sleep disturbance (due to hospitalization).    All other systems reviewed and are negative.    Historical Information   Past Medical History:   Diagnosis Date    A-fib (HCC)     Adrenal insufficiency (HCC)     Atrial fibrillation (HCC)     Diabetes mellitus (HCC)     History of stroke 10/19/2022    Obesity, morbid (HCC) 11/14/2022    Shock (HCC) 12/14/2023    Stroke (HCC)     Type 2 MI (myocardial infarction) (HCC) 11/03/2022     Past Surgical History:   Procedure Laterality Date    CARPAL TUNNEL RELEASE       Social History   Social History     Substance and Sexual Activity   Alcohol Use Not Currently    Alcohol/week: 5.0 standard drinks of alcohol    Types: 5 Cans of beer per week    Comment: Quit in august 2022     Social History     Substance and Sexual Activity   Drug Use Never     Social History     Tobacco Use   Smoking Status Former    Current packs/day: 0.25    Average packs/day: 0.3 packs/day for 30.0 years (7.5 ttl pk-yrs)    Types: Cigarettes   Smokeless Tobacco Never     Family History:   Family History   Problem Relation Age of Onset    Heart disease Mother     Cancer Father     Multiple sclerosis Sister      Meds/Allergies   all current active meds have been reviewed    Allergies   Allergen Reactions    Imipramine Other (See Comments)    Lisinopril Other (See Comments)    Paroxetine Other (See Comments)    Penicillins Hives    Rosiglitazone Other (See Comments)    Troglitazone Other (See Comments)     Objective     Intake/Output Summary (Last 24 hours) at 2/17/2025 1206  Last data filed at 2/17/2025 0801  Gross per 24 hour   Intake 990 ml   Output 950 ml   Net 40 ml     Invasive Devices       Peripheral Intravenous Line  Duration              Peripheral IV 02/14/25 Left Antecubital 2 days    Peripheral IV 02/15/25 Right Wrist 2 days                  Physical Exam  Vitals and nursing note reviewed.   Constitutional:       General: He is not in acute distress.     Appearance: Normal appearance.   HENT:      Head: Normocephalic.      Nose: Nose normal.      Mouth/Throat:      Mouth: Mucous membranes are moist.   Eyes:      General: No scleral icterus.        Right eye: No discharge.         Left eye: No discharge.      Conjunctiva/sclera: Conjunctivae normal.   Cardiovascular:      Rate and Rhythm: Normal rate and regular rhythm.   Pulmonary:      Effort: Pulmonary effort is normal. No respiratory distress.   Abdominal:      General: Bowel sounds are normal. There is no distension.      Palpations: Abdomen is soft.      Tenderness: There is no abdominal tenderness.   Musculoskeletal:      Cervical back: Neck supple.      Right lower leg: No edema.      Left lower leg: No edema.      Comments: Reduced overall muscle mass    Skin:     General: Skin is warm and dry.   Neurological:      Mental Status: He is alert.      Comments: Awake and alert answering ques appropriately    Psychiatric:         Mood and Affect: Mood normal.         Behavior: Behavior normal.      Comments: Pleasant and cooperative        Lab Results:     I have personally reviewed pertinent lab results including the following:    Results from last 7 days   Lab Units 02/17/25  0942 02/16/25  1525 02/16/25  0606 02/15/25  0606   WBC Thousand/uL 9.24  --  9.27 11.64*   HEMOGLOBIN g/dL 8.1* 8.1* 7.0* 9.1*   HEMATOCRIT % 25.2* 25.8* 22.1* 29.1*   PLATELETS Thousands/uL 163  --  136* 162   SEGS PCT % 67  --  67 67   MONO PCT % 9  --  11 11   EOS PCT % 2  --  2 1     Results from last 7 days   Lab Units 02/17/25  0942 02/16/25  0606 02/15/25  0606   POTASSIUM mmol/L 3.2* 3.5 3.4*   CHLORIDE mmol/L 102 104 105   CO2 mmol/L 31 31 29   BUN mg/dL 19 20 16   CREATININE mg/dL 0.92 0.99 0.85    CALCIUM mg/dL 7.9* 7.6* 7.8*     I have personally reviewed the following imaging study reports in PACS:    2/14/25- XR hip/pelvis, CTH, L femur XR, CXR    Therapies:   PT: following   OT: following     VTE Prophylaxis: Eliquis     Code Status: Level 1 - Full Code  Advance Directive and Living Will:      Power of :    POLST:      Family and Social Support: daughter    Goals of Care: recovery from acute injuries

## 2025-02-17 NOTE — ASSESSMENT & PLAN NOTE
-Ortho consulted, appreciate recs  -POD 2 Left INSERTION NAIL IM FEMUR ANTEGRADE (TROCHANTERIC) doing well postoperatively.   -NWB LLE  -PT/OT as indicated  -Multimodal pain regimen  -DVT ppx: okay per ortho, resume home eliquis  -Will monitor for ABLA and administer IVF/prbc as indicated for Greater than 2 gram drop or Hgb < 7

## 2025-02-17 NOTE — CASE MANAGEMENT
Auth obtained   Pt can dc to [facility] tomorrow   CM will submit for 1130 transport and follow up

## 2025-02-17 NOTE — PROGRESS NOTES
Patient:  DAVID KELLY    MRN:  [id number]    Aidin Request ID:  [id number]    Level of care reserved:  Skilled Nursing Facility    Partner Reserved:  [address], [place] [address] [phone]    Clinical needs requested:    Geography searched:  10 miles around [phone]    Start of Service:    Request sent:  2:00pm EST on 2/16/2025 by Ashia Asencio    Partner reserved:  8:25am EST on 2/17/2025 by Jamar Dhaliwal    Choice list shared:  8:25am EST on 2/17/2025 by Jamar Dhaliwal

## 2025-02-17 NOTE — CASE MANAGEMENT
[facility] received request for authorization from Care Manager.  Authorization request submitted for:   Facility Name: [facility]  NPI:  [id number]  Facility MD: Dr. Jeremy Costa  NPI: [id number]  Authorization initiated by contacting insurance:  [employer]  Via: H&CC Portal   Clinicals submitted via Portal attachment   Pending Reference #: [id number]     Care Manager notified: Will A    Updates to authorization status will be noted in chart. Please reach out to CM for updates on any clinical information.

## 2025-02-18 ENCOUNTER — TRANSITIONAL CARE MANAGEMENT (OUTPATIENT)
Dept: FAMILY MEDICINE CLINIC | Facility: CLINIC | Age: 70
End: 2025-02-18

## 2025-02-18 VITALS
SYSTOLIC BLOOD PRESSURE: 141 MMHG | WEIGHT: 188.27 LBS | OXYGEN SATURATION: 95 % | TEMPERATURE: 98.6 F | HEIGHT: 64 IN | RESPIRATION RATE: 17 BRPM | BODY MASS INDEX: 32.14 KG/M2 | DIASTOLIC BLOOD PRESSURE: 72 MMHG | HEART RATE: 71 BPM

## 2025-02-18 PROBLEM — S72.142A CLOSED COMMINUTED INTERTROCHANTERIC FRACTURE OF LEFT FEMUR (HCC): Status: RESOLVED | Noted: 2025-02-14 | Resolved: 2025-02-18

## 2025-02-18 PROBLEM — E83.42 HYPOMAGNESEMIA: Status: RESOLVED | Noted: 2023-12-07 | Resolved: 2025-02-18

## 2025-02-18 PROBLEM — T07.XXXA UNSPECIFIED MULTIPLE INJURIES, INITIAL ENCOUNTER: Status: ACTIVE | Noted: 2025-02-18

## 2025-02-18 PROBLEM — E87.6 HYPOKALEMIA: Status: RESOLVED | Noted: 2023-12-07 | Resolved: 2025-02-18

## 2025-02-18 PROBLEM — D62 ACUTE BLOOD LOSS ANEMIA: Status: RESOLVED | Noted: 2025-02-16 | Resolved: 2025-02-18

## 2025-02-18 PROBLEM — S72.002A CLOSED FRACTURE OF LEFT HIP (HCC): Status: ACTIVE | Noted: 2025-02-18

## 2025-02-18 LAB
GLUCOSE SERPL-MCNC: 134 MG/DL (ref 65–140)
GLUCOSE SERPL-MCNC: 52 MG/DL (ref 65–140)
GLUCOSE SERPL-MCNC: 92 MG/DL (ref 65–140)

## 2025-02-18 PROCEDURE — NC001 PR NO CHARGE: Performed by: ORTHOPAEDIC SURGERY

## 2025-02-18 PROCEDURE — 82948 REAGENT STRIP/BLOOD GLUCOSE: CPT

## 2025-02-18 RX ORDER — INSULIN LISPRO 100 [IU]/ML
1-5 INJECTION, SOLUTION INTRAVENOUS; SUBCUTANEOUS
Status: DISCONTINUED | OUTPATIENT
Start: 2025-02-18 | End: 2025-02-18 | Stop reason: HOSPADM

## 2025-02-18 RX ORDER — ACETAMINOPHEN 325 MG/1
975 TABLET ORAL EVERY 6 HOURS PRN
Qty: 270 TABLET | Refills: 0 | Status: SHIPPED | OUTPATIENT
Start: 2025-02-18

## 2025-02-18 RX ORDER — OXYCODONE HYDROCHLORIDE 5 MG/1
2.5 TABLET ORAL EVERY 4 HOURS PRN
Qty: 0.5 TABLET | Refills: 0 | Status: SHIPPED | OUTPATIENT
Start: 2025-02-18

## 2025-02-18 RX ORDER — AMOXICILLIN 250 MG
1 CAPSULE ORAL
Qty: 30 TABLET | Refills: 0 | Status: SHIPPED | OUTPATIENT
Start: 2025-02-18

## 2025-02-18 RX ADMIN — ASPIRIN 81 MG CHEWABLE TABLET 81 MG: 81 TABLET CHEWABLE at 08:55

## 2025-02-18 RX ADMIN — HYDROCORTISONE 5 MG: 5 TABLET ORAL at 08:54

## 2025-02-18 RX ADMIN — SERTRALINE 100 MG: 100 TABLET, FILM COATED ORAL at 08:54

## 2025-02-18 RX ADMIN — FLUDROCORTISONE ACETATE 0.2 MG: 0.1 TABLET ORAL at 08:54

## 2025-02-18 RX ADMIN — APIXABAN 5 MG: 5 TABLET, FILM COATED ORAL at 08:54

## 2025-02-18 RX ADMIN — ACETAMINOPHEN 975 MG: 325 TABLET, FILM COATED ORAL at 06:15

## 2025-02-18 RX ADMIN — ATORVASTATIN CALCIUM 10 MG: 10 TABLET, FILM COATED ORAL at 08:54

## 2025-02-18 NOTE — QUICK NOTE
"Spoke with the patient started this morning.  She expressed concern that his blood sugar was 52 this morning.  She states that she called yesterday and was assured that her sugar will be checked every several hours and that it was not.  She states that he has been hospitalized recently where his blood sugar got so low he had to go to the ICU.  She states that we need to make sure that this blood sugar is not going well.  She says that she is upset that it took so long for us to get a repeat blood sugar.  She states that she would like to speak with someone \"higher up\" the writer.  I reassured her that we have been checking his sugars appropriately.  She states that ever since his last stroke he does not exhibit normal signs of hypoglycemia.  She states that he does take 18 units of long-acting at night.  I reassured her and That we would be checking until he is discharged.  Spoke with her stating that he will likely be discharged later today.  "

## 2025-02-18 NOTE — ASSESSMENT & PLAN NOTE
Chronic, in setting of adrenal insufficiency and loop diuretics  S/p repletion with IV and PO potassium  Resolved

## 2025-02-18 NOTE — PLAN OF CARE
Problem: METABOLIC, FLUID AND ELECTROLYTES - ADULT  Goal: Electrolytes maintained within normal limits  Description: INTERVENTIONS:  - Monitor labs and assess patient for signs and symptoms of electrolyte imbalances  - Administer electrolyte replacement as ordered  - Monitor response to electrolyte replacements, including repeat lab results as appropriate  - Instruct patient on fluid and nutrition as appropriate  Outcome: Progressing  Goal: Fluid balance maintained  Description: INTERVENTIONS:  - Monitor labs   - Monitor I/O and WT  - Instruct patient on fluid and nutrition as appropriate  - Assess for signs & symptoms of volume excess or deficit  Outcome: Progressing  Goal: Glucose maintained within target range  Description: INTERVENTIONS:  - Monitor Blood Glucose as ordered  - Assess for signs and symptoms of hyperglycemia and hypoglycemia  - Administer ordered medications to maintain glucose within target range  - Assess nutritional intake and initiate nutrition service referral as needed  Outcome: Progressing     Problem: MUSCULOSKELETAL - ADULT  Goal: Maintain or return mobility to safest level of function  Description: INTERVENTIONS:  - Assess patient's ability to carry out ADLs; assess patient's baseline for ADL function and identify physical deficits which impact ability to perform ADLs (bathing, care of mouth/teeth, toileting, grooming, dressing, etc.)  - Assess/evaluate cause of self-care deficits   - Assess range of motion  - Assess patient's mobility  - Assess patient's need for assistive devices and provide as appropriate  - Encourage maximum independence but intervene and supervise when necessary  - Involve family in performance of ADLs  - Assess for home care needs following discharge   - Consider OT consult to assist with ADL evaluation and planning for discharge  - Provide patient education as appropriate  Outcome: Progressing     Problem: PAIN - ADULT  Goal: Verbalizes/displays adequate comfort  level or baseline comfort level  Description: Interventions:  - Encourage patient to monitor pain and request assistance  - Assess pain using appropriate pain scale  - Administer analgesics based on type and severity of pain and evaluate response  - Implement non-pharmacological measures as appropriate and evaluate response  - Consider cultural and social influences on pain and pain management  - Notify physician/advanced practitioner if interventions unsuccessful or patient reports new pain  Outcome: Progressing     Problem: Prexisting or High Potential for Compromised Skin Integrity  Goal: Skin integrity is maintained or improved  Description: INTERVENTIONS:  - Identify patients at risk for skin breakdown  - Assess and monitor skin integrity  - Assess and monitor nutrition and hydration status  - Monitor labs   - Assess for incontinence   - Turn and reposition patient  - Assist with mobility/ambulation  - Relieve pressure over bony prominences  - Avoid friction and shearing  - Provide appropriate hygiene as needed including keeping skin clean and dry  - Evaluate need for skin moisturizer/barrier cream  - Collaborate with interdisciplinary team   - Patient/family teaching  - Consider wound care consult   Outcome: Progressing

## 2025-02-18 NOTE — ASSESSMENT & PLAN NOTE
-Ortho consulted, appreciate recs  -POD 3 Left INSERTION NAIL IM FEMUR ANTEGRADE (TROCHANTERIC) doing well postoperatively.   -NWB LLE  -PT/OT as indicated  -Multimodal pain regimen  -DVT ppx: okay per ortho, resume home eliquis  -Will monitor for ABLA and administer IVF/prbc as indicated for Greater than 2 gram drop or Hgb < 7

## 2025-02-18 NOTE — CASE MANAGEMENT
Pt’s transport to [facility], confirmed at 1130 via [facility]

## 2025-02-18 NOTE — PROGRESS NOTES
Progress Note - Trauma   Name: Karson You 70 y.o. male I MRN: [id number]  [facility] I Date of Admission: 2/14/2025   Date of Service: 2/18/2025 I Hospital Day: 4    Assessment & Plan  Closed comminuted intertrochanteric fracture of left femur (HCC) (Resolved: 2/18/2025)  -Ortho consulted, appreciate recs  -POD 3 Left INSERTION NAIL IM FEMUR ANTEGRADE (TROCHANTERIC) doing well postoperatively.   -NWB LLE  -PT/OT as indicated  -Multimodal pain regimen  -DVT ppx: okay per ortho, resume home eliquis  -Will monitor for ABLA and administer IVF/prbc as indicated for Greater than 2 gram drop or Hgb < 7   Acute blood loss anemia (Resolved: 2/18/2025)  Hgb 8.1 from 8.1  Continue to monitor H&H  Hypokalemia (Resolved: 2/18/2025)  Chronic, in setting of adrenal insufficiency and loop diuretics  S/p repletion with IV and PO potassium  Resolved  Closed fracture of left hip (HCC)    Unspecified multiple injuries, initial encounter      Bowel Regimen: Senna  VTE Prophylaxis:VTE covered by:    None         Disposition: DC to [facility] today     24 Hour Events : 1 episode of hypoglycemia sugar of 52, was asymptomatic.  Given orange juice and follow-up was 92  Subjective : Patient states he has some pain but has been asking for pain medication.  He states that he feels well overall.  States that he has not felt lightheaded at all this morning including when his sugar was low.    Objective :  Temp:  [98.5 °F (36.9 °C)-100.1 °F (37.8 °C)] 98.6 °F (37 °C)  HR:  [71-77] 71  BP: (127-142)/(63-73) 141/72  Resp:  [16-24] 17  SpO2:  [95 %-97 %] 95 %    I/O         02/16 0701  02/17 0700 02/17 0701  02/18 0700 02/18 0701  02/19 0700    P.O.  600     Blood 390      IV Piggyback       Total Intake(mL/kg) 390 (4.6) 600 (7)     Urine (mL/kg/hr) 750 (0.4) 200 (0.1)     Blood       Total Output 750 200     Net -360 +400            Unmeasured Urine Occurrence 1 x 1 x     Unmeasured Stool Occurrence  1 x             Physical  Exam  Vitals and nursing note reviewed.   Constitutional:       Appearance: Normal appearance. He is well-developed.   HENT:      Head: Normocephalic and atraumatic.      Nose: Nose normal.      Mouth/Throat:      Mouth: Mucous membranes are moist.      Pharynx: No oropharyngeal exudate or posterior oropharyngeal erythema.   Eyes:      Extraocular Movements: Extraocular movements intact.      Conjunctiva/sclera: Conjunctivae normal.      Pupils: Pupils are equal, round, and reactive to light.   Cardiovascular:      Rate and Rhythm: Normal rate and regular rhythm.      Pulses: Normal pulses.      Heart sounds: Normal heart sounds.   Pulmonary:      Effort: Pulmonary effort is normal.      Breath sounds: Normal breath sounds.   Abdominal:      General: Bowel sounds are normal. There is no distension.      Palpations: Abdomen is soft.      Tenderness: There is no abdominal tenderness. There is no guarding or rebound.   Musculoskeletal:      Cervical back: Normal range of motion and neck supple.      Comments: Dressing on left hip clean dry and intact.  Tenderness to the area.  No erythema or warmth no purulent drainage.   Skin:     General: Skin is warm and dry.      Capillary Refill: Capillary refill takes less than 2 seconds.   Neurological:      General: No focal deficit present.      Mental Status: He is alert. Mental status is at baseline.      Cranial Nerves: No cranial nerve deficit.   Psychiatric:         Mood and Affect: Mood normal.         Behavior: Behavior normal.               Lab Results: I have reviewed the following results:  Recent Labs     02/17/25  0942   WBC 9.24   HGB 8.1*   HCT 25.2*      SODIUM 138   K 3.2*      CO2 31   BUN 19   CREATININE 0.92   GLUC 143*

## 2025-02-18 NOTE — ASSESSMENT & PLAN NOTE
Assessment:    70 y.o. male s/p mechanical fall with left intertrochanteric hip fracture. Now s/p left femur IMN on 2/15/25.       Plan:  WBAT LLE  PT/OT  Pain control  DVT ppx: eliquis  Will monitor for ABLA and administer IVF/prbc as indicated for Greater than 2 gram drop or Hgb < 7   Has ABLA  Rest of care per primary team  Ok for discharge from ortho perspective  Follow up with Dr Chavez 2 weeks post op for routine post op evaluation

## 2025-02-20 DIAGNOSIS — I50.32 CHRONIC DIASTOLIC HEART FAILURE (HCC): ICD-10-CM

## 2025-02-20 DIAGNOSIS — E10.9 INSULIN DEPENDENT TYPE 1 DIABETES MELLITUS (HCC): ICD-10-CM

## 2025-02-20 RX ORDER — INSULIN LISPRO 100 [IU]/ML
6 INJECTION, SOLUTION INTRAVENOUS; SUBCUTANEOUS
Qty: 18 ML | Refills: 1 | Status: SHIPPED | OUTPATIENT
Start: 2025-02-20

## 2025-02-20 RX ORDER — INSULIN GLARGINE 300 U/ML
18 INJECTION, SOLUTION SUBCUTANEOUS
Qty: 6 ML | Refills: 1 | Status: SHIPPED | OUTPATIENT
Start: 2025-02-20

## 2025-02-20 RX ORDER — PEN NEEDLE, DIABETIC 32GX 5/32"
NEEDLE, DISPOSABLE MISCELLANEOUS
Qty: 100 EACH | Refills: 5 | Status: SHIPPED | OUTPATIENT
Start: 2025-02-20

## 2025-02-24 DIAGNOSIS — E27.40 ADRENAL INSUFFICIENCY (HCC): ICD-10-CM

## 2025-02-24 NOTE — DISCHARGE SUMMARY
"Discharge Summary - Trauma   Name: Karson You 70 y.o. male I MRN: [id number]  Unit/Bed#: [facility] 825-01 I Date of Admission: 2/14/2025   Date of Service: 02/18/25  I Hospital Day: 4    Admission Date: 2/14/2025 1119  Discharge Date: 02/18/25   Admitting Diagnosis: Hypokalemia [E87.6]  Closed fracture of left hip, initial encounter ([facility]) [S72.002A]  Unspecified multiple injuries, initial encounter [T07.XXXA]  Discharge Diagnosis:   Medical Problems       Resolved Problems  Date Reviewed: 2/7/2025          Resolved    Hypokalemia 2/18/2025     Resolved by  Quinn Walker MD    Hypomagnesemia 2/18/2025     Resolved by  Quinn Walker MD    Closed comminuted intertrochanteric fracture of left femur ([facility]) 2/18/2025     Resolved by  Quinn Walker MD    Acute blood loss anemia 2/18/2025     Resolved by  Quinn Walker MD          HPI: 69 y.o. male who presented to [facility] ED this evening for evaluation of left hip pain. Patient is accompanied by daughter who assisted in providing history. She states the patient had a mechanical fall onto left hip today while getting out of bed and has since not been able to bear weight 2/2 pain. Patient takes eliquis. No head strike or LOC. No other complaints at this time. In ED, xray positive for \"comminuted angulated intertrochanteric femoral fracture\".     Procedures Performed: No orders of the defined types were placed in this encounter.      Summary of Hospital Course:  presented to the emergency department for left hip pain.  On x-ray found a continued angulated trajectory anterior femoral fracture of the left femur patient admitted for surgical intervention patient received a intramedullary femoral nail on 2/15.Patient did have a drop in his hemoglobin to 7.0 which required a blood transfusion which is not unexpected.  Patient appropriately responded to the 1 unit to hemoglobin of 8.1.  Physical therapy evaluated the patient recommended rehab.  Patient was discharged stable " condition.    Significant Findings, Care, Treatment and Services Provided: Femur fracture requiring intramedullary nail.  Acute blood loss anemia requiring single unit of blood transfusion which was expected.  PT OT and orthopedics evaluations case management evaluation    Complications: None    Condition at Discharge: stable       Discharge instructions/Information to patient and family:   See After Visit Summary (AVS) for information provided to patient and family.      Provisions for Follow-Up Care:  See after visit summary for information related to follow-up care and any pertinent home health orders.      PCP: NACHO Kenney    Disposition: Short-term rehab at [facility]    Planned Readmission: No     Discharge Medications:  See after visit summary for reconciled discharge medications provided to patient and family.      Discharge Statement:  I have spent a total time of 33 minutes in caring for this patient on the day of the visit/encounter. .

## 2025-02-27 ENCOUNTER — TELEPHONE (OUTPATIENT)
Age: 70
End: 2025-02-27

## 2025-02-27 RX ORDER — FLUDROCORTISONE ACETATE 0.1 MG/1
0.2 TABLET ORAL DAILY
Qty: 180 TABLET | Refills: 1 | Status: SHIPPED | OUTPATIENT
Start: 2025-02-27

## 2025-02-27 NOTE — TELEPHONE ENCOUNTER
Caller: Lenore    Doctor: Dr. Chavez    Reason for call:  Lenore from [facility] missed calling to schedule post op appt  Patient will be using Rehab's transportation and the last appointment time they can schedule is 2.  No appt until 3/26.  Can not travel to Dr Chavez's other location Anyway patient can be seen before 3/26?     Call back#: [phone]

## 2025-02-28 ENCOUNTER — TELEPHONE (OUTPATIENT)
Age: 70
End: 2025-02-28

## 2025-02-28 DIAGNOSIS — S72.002D CLOSED FRACTURE OF LEFT HIP WITH ROUTINE HEALING, SUBSEQUENT ENCOUNTER: Primary | ICD-10-CM

## 2025-02-28 NOTE — TELEPHONE ENCOUNTER
Called and spoke to james Chan leave dressing intact until follow up. She states middle dressing fell of, would you like her to replace it or leave PORTILLO. She states the incision is CDI. Please advise, thanks!

## 2025-02-28 NOTE — TELEPHONE ENCOUNTER
Hello,    Please advise if a forced appointment can be accommodated for the patient:    Call back #: [phone] Rocio    Insurance: [employer]    Reason for appointment: Patient had sx with Dr Chavez 2/15/25. Rocio called to schedule a two week post op 3/4/25 left hip.      Requested doctor and/or location: Dr Scott Gaines. Stated they need a late morning appt is possible      Thank you.

## 2025-02-28 NOTE — TELEPHONE ENCOUNTER
Caller: [facility]    Doctor: Dr. Chavez    Reason for call: Rocio from [facility] is asking for orders for dressing change for patients left hip sx with Dr Chavez 2/15/25. Please advise.    Call back#: [phone]

## 2025-02-28 NOTE — TELEPHONE ENCOUNTER
Spoke to Rocio. Patient almost 2 weeks out. Incision is only about an inch long in this region that fell off. She can just put a large bandaid on this. FU with Scott is scheduled for wed.

## 2025-03-03 ENCOUNTER — TELEPHONE (OUTPATIENT)
Age: 70
End: 2025-03-03

## 2025-03-03 NOTE — TELEPHONE ENCOUNTER
Caller: Sangeeta/[facility]    Doctor: Dr. Chavez    Reason for call: The patients daughter would like to reschedule the 3/5 appt as she has to work. She requested an appt either on a Tues/Thurs. Advised Dr Chavez is in [place] those days. Sangeeta questioned if the patient may see a PA in [place] on a Tues/Thurs? Please cb to discuss    Call back#: [phone]

## 2025-03-03 NOTE — TELEPHONE ENCOUNTER
Caller: Lenore from [facility]    Doctor: Dr. Chavez    Reason for call: daughter wants to know if she can schedule her fathers appointment to be seen on a Tues or Thurs. With PA in [place] office    Call back#: [phone]

## 2025-03-03 NOTE — TELEPHONE ENCOUNTER
Sangeeta called to make sure someone was getting back to her in regards to adjusting his appointment , as daughter wants to be there but cannot make .    She leaves at 3pm today and needs to know if this can be adjusted.    C/b # [phone]

## 2025-03-12 ENCOUNTER — HOSPITAL ENCOUNTER (OUTPATIENT)
Dept: RADIOLOGY | Facility: HOSPITAL | Age: 70
Discharge: HOME | End: 2025-03-12
Attending: ORTHOPAEDIC SURGERY
Payer: COMMERCIAL

## 2025-03-12 ENCOUNTER — OFFICE VISIT (OUTPATIENT)
Dept: OBGYN CLINIC | Facility: HOSPITAL | Age: 70
End: 2025-03-12

## 2025-03-12 VITALS — HEIGHT: 64 IN | WEIGHT: 188 LBS | BODY MASS INDEX: 32.1 KG/M2

## 2025-03-12 DIAGNOSIS — S72.002D CLOSED FRACTURE OF LEFT HIP WITH ROUTINE HEALING, SUBSEQUENT ENCOUNTER: Primary | ICD-10-CM

## 2025-03-12 DIAGNOSIS — S72.002D CLOSED FRACTURE OF LEFT HIP WITH ROUTINE HEALING, SUBSEQUENT ENCOUNTER: ICD-10-CM

## 2025-03-12 PROCEDURE — 73552 X-RAY EXAM OF FEMUR 2/>: CPT

## 2025-03-12 NOTE — PROGRESS NOTES
Name: Karson You      : 1955       MRN: [id number]   Encounter Provider: Alfie Chavez MD   Encounter Date: 25  Encounter department: [facility]         Assessment & Plan  Closed fracture of left hip with routine healing, subsequent encounter  4 weeks s/p left trochanteric IM nail fixation of IT fracture, 2/15/2025.  Patient here with Ofelia caldwell  Patient doing well  Patient resides at [facility]  Continue physical therapy  Follow up in 4 weeks    Advised patient with settling of fracture and prominent lateral lag screw may develop chronic lateral pain/difficulty laying on this side                  To do next visit:  Repeat x-rays    _____________________________________________________  CHIEF COMPLAINT:  Chief Complaint   Patient presents with    Left Leg - Post-op         SUBJECTIVE:  Karson You is a 70 y.o. male who presents 4 weeks s/p left trochanteric IM nail, 2/15/2025.  He resides at [facility].  He is doing ok.  Today he complains of left lateral hip pain.  He denies groin, lumbar pain or significant radiating symptoms into legs.  He does physical therapy daily including.  He denies fever, chills or shortness of breath.            PAST MEDICAL HISTORY:  Past Medical History:   Diagnosis Date    A-fib ([facility])     Adrenal insufficiency (HCC)     Atrial fibrillation (HCC)     Diabetes mellitus ([facility])     History of stroke 10/19/2022    Obesity, morbid ([facility]) 2022    Shock ([facility]) 2023    Stroke ([facility])     Type 2 MI (myocardial infarction) ([facility]) 2022       PAST SURGICAL HISTORY:  Past Surgical History:   Procedure Laterality Date    CARPAL TUNNEL RELEASE      WA OPTX FEM SHFT FX W/INSJ IMED IMPLT W/WO SCREW Left 2/15/2025    Procedure: INSERTION NAIL IM FEMUR ANTEGRADE (TROCHANTERIC);  Surgeon: Alfie Chavez MD;  Location: [facility];  Service: Orthopedics       FAMILY HISTORY:  Family History   Problem Relation  Age of Onset    Heart disease Mother     Cancer Father     Multiple sclerosis Sister        SOCIAL HISTORY:  Social History     Tobacco Use    Smoking status: Former     Current packs/day: 0.25     Average packs/day: 0.3 packs/day for 30.0 years (7.5 ttl pk-yrs)     Types: Cigarettes    Smokeless tobacco: Never   Vaping Use    Vaping status: Never Used   Substance Use Topics    Alcohol use: Not Currently     Alcohol/week: 5.0 standard drinks of alcohol     Types: 5 Cans of beer per week     Comment: Quit in august 2022    Drug use: Never       MEDICATIONS:    Current Outpatient Medications:     acetaminophen (TYLENOL) 325 mg tablet, Take 3 tablets (975 mg total) by mouth every 6 (six) hours as needed for mild pain, Disp: 270 tablet, Rfl: 0    albuterol (PROVENTIL HFA,VENTOLIN HFA) 90 mcg/act inhaler, Inhale 2 puffs every 4 (four) hours as needed for wheezing, Disp: , Rfl:     apixaban (Eliquis) 5 mg, Take 1 tablet (5 mg total) by mouth 2 (two) times a day, Disp: 60 tablet, Rfl: 3    aspirin 81 mg chewable tablet, Chew 81 mg daily, Disp: , Rfl:     atorvastatin (LIPITOR) 10 mg tablet, Take 1 tablet by mouth daily, Disp: , Rfl:     Continuous Blood Gluc  (Dexcom G7 ) CYN, Use 1 each continuous, Disp: 1 each, Rfl: 0    Continuous Glucose Sensor (Dexcom G7 Sensor), Use 1 Device every 10 days, Disp: 9 each, Rfl: 1    fludrocortisone (FLORINEF) 0.1 mg tablet, TAKE 2 TABLETS BY MOUTH EVERY DAY, Disp: 180 tablet, Rfl: 1    furosemide (LASIX) 20 mg tablet, Take 1 tablet (20 mg total) by mouth daily, Disp: 30 tablet, Rfl: 2    hydrocortisone (CORTEF) 5 mg tablet, USE 4 TABLETS IN MORNING AND 3 TABLETS DAILY AFTERNOON, Disp: 210 tablet, Rfl: 6    insulin glargine (Toujeo Max SoloStar) 300 units/mL CONCENTRATED U-300 injection pen (2-unit dial), Inject 18 Units under the skin daily at bedtime, Disp: 6 mL, Rfl: 1    insulin lispro (HumALOG/ADMELOG) 100 units/mL injection, Inject 6 Units under the skin 3 (three)  "times a day with meals, Disp: 18 mL, Rfl: 1    Insulin Pen Needle (BD Pen Needle Josie 2nd Gen) 32G X 4 MM MISC, For use with insulin pen 4 times daily. Pharmacy may dispense brand covered by insurance., Disp: 200 each, Rfl: 3    Insulin Pen Needle (BD Pen Needle Josie 2nd Gen) 32G X 4 MM MISC, FOR USE WITH INSULIN PEN. PHARMACY MAY DISPENSE BRAND COVERED BY INSURANCE., Disp: 100 each, Rfl: 5    Magnesium Oxide 400 MG CAPS, Take 1 tablet (400 mg total) by mouth 2 (two) times a day, Disp: 180 tablet, Rfl: 3    oxyCODONE (ROXICODONE) 5 immediate release tablet, Take 0.5 tablets (2.5 mg total) by mouth every 4 (four) hours as needed for severe pain for up to 10 doses Max Daily Amount: 15 mg, Disp: 0.5 tablet, Rfl: 0    potassium chloride (Klor-Con) 10 mEq tablet, Take 1 tablet (10 mEq total) by mouth in the morning, Disp: 30 tablet, Rfl: 0    senna-docusate sodium (SENOKOT S) 8.6-50 mg per tablet, Take 1 tablet by mouth daily at bedtime, Disp: 30 tablet, Rfl: 0    sertraline (ZOLOFT) 100 mg tablet, Take 100 mg by mouth daily, Disp: , Rfl:     ALLERGIES:  Allergies   Allergen Reactions    Imipramine Other (See Comments)    Lisinopril Other (See Comments)    Paroxetine Other (See Comments)    Penicillins Hives    Rosiglitazone Other (See Comments)    Troglitazone Other (See Comments)       LABS:  HgA1c:   Lab Results   Component Value Date    HGBA1C 8.3 (H) 01/19/2025     BMP:   Lab Results   Component Value Date    GLUCOSE 247 (H) 12/18/2023    CALCIUM 7.9 (L) 02/17/2025    K 3.2 (L) 02/17/2025    CO2 31 02/17/2025     02/17/2025    BUN 19 02/17/2025    CREATININE 0.92 02/17/2025     CBC: No components found for: \"CBC\"    _____________________________________________________  PHYSICAL EXAMINATION:  Vital signs: Ht 5' 4\" (1.626 m)   Wt 85.3 kg (188 lb)   BMI 32.27 kg/m²   General: No acute distress, awake and alert  Psychiatric: Mood and affect appear appropriate  HEENT: Trachea Midline, No torticollis, no apparent " facial trauma  Cardiovascular: No audible murmurs; Extremities appear perfused  Pulmonary: No audible wheezing or stridor  Skin: No open lesions; see further details (if any) below    MUSCULOSKELETAL EXAMINATION:  Left hip:  Well healed incisional scars  No erythema or ecchymosis  Patient able to stand from seated position with no assistance  Calf compartments soft and supple  Sensation intact  Toes are warm sensate and mobile          _____________________________________________________  STUDIES REVIEWED:  I personally reviewed the images obtained in office today and my independent interpretation is as follows:    Left femur x-ray:  Interval healing trochanteric fracture with well placed IM nail and locking bolts.  No evidence of hardware failure.      PROCEDURES PERFORMED:  Procedures        Scribe Attestation      I,:  Jamar Mims MA am acting as a scribe while in the presence of the attending physician.:       I,:  Alfie Chavez MD personally performed the services described in this documentation    as scribed in my presence.:

## 2025-03-12 NOTE — ASSESSMENT & PLAN NOTE
4 weeks s/p left trochanteric IM nail fixation of IT fracture, 2/15/2025.  Patient here with Ofelia caldwell  Patient doing well  Patient resides at [facility]  Continue physical therapy  Follow up in 4 weeks    Advised patient with settling of fracture and prominent lateral lag screw may develop chronic lateral pain/difficulty laying on this side

## 2025-03-18 ENCOUNTER — TELEPHONE (OUTPATIENT)
Age: 70
End: 2025-03-18

## 2025-03-18 DIAGNOSIS — S72.002S CLOSED FRACTURE OF LEFT HIP, SEQUELA: Primary | ICD-10-CM

## 2025-03-18 RX ORDER — MELOXICAM 7.5 MG/1
7.5 TABLET ORAL DAILY
Qty: 30 TABLET | Refills: 0 | Status: SHIPPED | OUTPATIENT
Start: 2025-03-18

## 2025-03-18 NOTE — TELEPHONE ENCOUNTER
When was he discharged from rehab? I do not see a TCM call and yes TCMs can be virtual. He does need a follow-up appointment. I will send meloxicam 7.5 mg to [facility] pharmacy in the interim.

## 2025-03-18 NOTE — TELEPHONE ENCOUNTER
Called pt and spoke with his daughter  and he is saying that the place was supposed to give him medication for his hip but they didn't she is wondering if you'd fill the prescription/ give him oxy for the pain or something for the pain, they only gave him tylenol. Pt does not want to schedule a TCM and was told from doctor in the Rehab to call the PCP.    Pt also wants to know if a virtual can be done TCM?

## 2025-03-18 NOTE — TELEPHONE ENCOUNTER
Pt daughter called to report father was released from Rehab for  broken hip and was sent home with no pain medications. Pt is in severe pain.  Pt daughter is requesting Oxycodone to be called into his [facility] on [facility]. Any questions or concerns please contact Ofelia

## 2025-03-24 ENCOUNTER — NURSE TRIAGE (OUTPATIENT)
Age: 70
End: 2025-03-24

## 2025-03-24 ENCOUNTER — PATIENT MESSAGE (OUTPATIENT)
Dept: ENDOCRINOLOGY | Facility: CLINIC | Age: 70
End: 2025-03-24

## 2025-03-24 NOTE — TELEPHONE ENCOUNTER
Daughter is returning a call states she was advised to call back at 5pm if she did not hear from office today. Reports bg before dinner meal was 145 and she gave 6u Humalog as ordered. States current bg at time of call is at 185 after consuming a large meal- she wanted to give provider update. Reports he has no symptoms, but they fear his bg level will drop again randomly and would like to discuss further. Reports they will monitor CGM and that they will recheck bg via fingerstick for abnormally low bg level, and will cont to monitor him. They are asking if provider can please send a script to [address] for Glucagon in the event that he does drop low in the near future again? States he had this order through previous endo provider and was very useful. Please advise, patients daughter is requesting a call back to discuss above triage, and to discuss Glucagon request. States if she does not answer to please leave a message. Thank you

## 2025-03-24 NOTE — TELEPHONE ENCOUNTER
"FOLLOW UP: call back by end of day today     REASON FOR CONVERSATION: low blood sugars    SYMPTOMS: none since having a stroke he doesn't present with symptoms.    OTHER: low bs for the past 4 days, can't keep it up. Advised to hold if BS is 120 or less.     DISPOSITION: Callback From Office Today      Daughter calling stating for the past 4 days that her fathers sugar has been dropping. She said he has a CGM and is connected with the office. He had a stroke and since that he doesn't show symptoms of high or low sugars. She said he ate a large dinner and had a snack and her sugar was 77 and she double checked with a FS and it was around the same range. She said he usually runs 150-170 up to the low 200's which they are okay with that. He has home nurses that visit and he was D/c from nursing home last Monday. Insulin on file matches what he takes. I advised to hold the insulin if his sugar is 120 or below due to his dropping in sugar. She said he ate a tuna sandwich and claudia cup for lunch and his sugar is only 127. They are asking if the doctor can review his CGM and make further changes? No new medications have been started and he has not been sick lately. Please advise. Told to call by by 5pm if they don't receive a call.     Answer Assessment - Initial Assessment Questions  1. SYMPTOMS: \"What symptoms are you concerned about?\"      Denies, since stroke has not had symptoms.   2. ONSET:  \"When did the symptoms start?\"      Last 4 days   3. BLOOD GLUCOSE: \"What is your blood glucose level?\"       50's-60's  4. USUAL RANGE: \"What is your blood glucose level usually?\" (e.g., usual fasting morning value, usual evening value)      150-170  5. TYPE 1 or 2:  \"Do you know what type of diabetes you have?\"  (e.g., Type 1, Type 2, Gestational; doesn't know)       Type 1  6. INSULIN: \"Do you take insulin?\" \"What type of insulin(s) do you use? What is the mode of delivery? (syringe, pen; injection or pump) \"When did you last " "give yourself an insulin dose?\" (i.e., time or hours/minutes ago) \"How much did you give?\" (i.e., how many units)      yes  7. DIABETES PILLS: \"Do you take any pills for your diabetes?\" If Yes, ask: \"What is the name of the medicine(s) that you take for high blood sugar?\"      denies  8. OTHER SYMPTOMS: \"Do you have any symptoms?\" (e.g., fever, frequent urination, difficulty breathing, vomiting)      denies  9. LOW BLOOD GLUCOSE TREATMENT: \"What have you done so far to treat the low blood glucose level?\"      Eats and drinks, had tuna sandwich and claudia cup and sugar is only 127  10. FOOD: \"When did you last eat or drink?\"        Eats and drinks   11. ALONE: \"Are you alone right now or is someone with you?\"         Always with someone    Protocols used: Diabetes - Low Blood Sugar-Adult-OH    "

## 2025-03-24 NOTE — PATIENT COMMUNICATION
Phone call from patient's daughter sent message earlier via My Chart has received no response. Transferred to [facility] for further discussion.

## 2025-03-26 ENCOUNTER — PATIENT MESSAGE (OUTPATIENT)
Dept: ENDOCRINOLOGY | Facility: CLINIC | Age: 70
End: 2025-03-26

## 2025-03-26 ENCOUNTER — TREATMENT (OUTPATIENT)
Dept: ENDOCRINOLOGY | Facility: CLINIC | Age: 70
End: 2025-03-26

## 2025-03-26 DIAGNOSIS — E27.40 ADRENAL INSUFFICIENCY (HCC): ICD-10-CM

## 2025-03-26 DIAGNOSIS — E10.65 TYPE 1 DIABETES MELLITUS WITH HYPERGLYCEMIA (HCC): Primary | ICD-10-CM

## 2025-03-26 RX ORDER — IBUPROFEN 600 MG/1
1 TABLET ORAL AS NEEDED
Qty: 1 KIT | Refills: 1 | Status: SHIPPED | OUTPATIENT
Start: 2025-03-26

## 2025-03-26 NOTE — TELEPHONE ENCOUNTER
The recommended daily dose of the medication is 15 mg can alternate with tylenol and will have routine labs. Pt is scheduled for an appointment 3/28/25 we can discuss further.

## 2025-03-26 NOTE — TELEPHONE ENCOUNTER
Patricia from [facility] is calling regarding patients pain level, she states that they are concerned with given him the medication he was proscribed due to his liver functions, meloxicam 7.5 mg was sent to [facility] pharmacy    Patient is complaining of a pain level 8 out of 10    Please review and advise

## 2025-03-26 NOTE — TELEPHONE ENCOUNTER
Please advise Patricia patient's liver function as of labs 1/21/25 was fine. Meloxicam is less likely to cause liver damage affects the kidneys and patient's kidney  function is normal.

## 2025-03-26 NOTE — PROGRESS NOTES
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 However, as of recent has been ambulating at home with walker. Rossy Guzman presented to [facility] on 6/22/24 with c/o few days with generalized weakness, fatigue, headache and confusion for 1 week. Presenting BP was 239/110. The patient's LKWT unclear, possibly 6/15/24. Patient was admitted for hypertensive urgency with troponin level initially at 122, trended downward to a level of 96 and BNP 22,994. Cardiology consulted for elevated troponin, felt to be related to severe hypertension and elevated BNP also likely related to hypertension. On 6/23/24 patient's family reported patient not back to mental baseline, leading to MRI brain being ordered. MRI brain done 6/24/24 and revealed acute/subacute subarachnoid hemorrhage along the left frontal lobe and questioned trace right posterior parafalcine subdural hematoma. Small evolving infarct in the paramedian left parietal lobe/posterior cingulate and additional punctate evolving infarcts in the paramedian left frontal lobe also noted. Following MRI Brain, PTA aspirin and eliquis held. CT head later that day (6/24) demonstrated stable subarachnoid hemorrhage along the left frontal lobe and trace right parafalcine subdural hematoma. Patient subsequently transferred to [facility] neuro ICU for further evaluation and care. Repeat CT head was without significant change compared to prior. CTA head and neck with occlusion of the bilateral carotid terminus with significant narrowing of the bilateral cavernous segments in the range of 80-90%.  Diffuse moderate narrowing of the bilateral MCA and RADHA vessels and an irregular 8-9 mm nodule is partially visualized within the left apex also noted. Stroke neurology consulted for further workup and recommendations. Patient noted recent unintentional weight loss of 40lb she believes is due to poor appetite and has occasional night sweats every other month. Prior smoker for ~ 10 years.     Subjective/Interval: Patient resting in bed. Had just been woken up, not very talkative at first, but as time went on, patient more conversive, still minimal.      Current Facility-Administered Medications   Medication    magnesium oxide (MAG-OX) tablet 400 mg    polyethylene glycol (MIRALAX) packet 17 g    ondansetron (ZOFRAN ODT) disintegrating tablet 4 mg    Or    ondansetron (ZOFRAN) injection 4 mg    acetaminophen (TYLENOL) tablet 650 mg    oxyCODONE (IMM REL) (ROXICODONE) tablet 5 mg    fentaNYL (SUBLIMAZE) injection 25 mcg    polyethylene glycol (MIRALAX) packet 17 g    docusate sodium-sennosides (SENOKOT S) 50-8.6 MG 2 tablet    bisacodyl (DULCOLAX) suppository 10 mg    magnesium hydroxide (MILK OF MAGNESIA) 400 MG/5ML suspension 30 mL    sodium chloride 0.9 % injection 2 mL    atorvastatin (LIPITOR) tablet 80 mg    carvedilol (COREG) tablet 3.125 mg    venlafaxine XR (EFFEXOR XR) 24 hr capsule 75 mg    sodium chloride 0.9 % flush bag 25 mL    sodium chloride 0.9 % injection 2 mL    dextrose 50 % injection 25 g    dextrose 50 % injection 12.5 g    glucagon (GLUCAGEN) injection 1 mg    dextrose (GLUTOSE) 40 % gel 15 g    dextrose (GLUTOSE) 40 % gel 30 g    insulin lispro (ADMELOG,HumaLOG) - Scheduled Mealtime Dose    insulin lispro (ADMELOG,HumaLOG) - Correction Dose    insulin glargine (LANTUS) injection 10 Units    [Held by provider] aspirin (ECOTRIN) enteric coated tablet 81 mg    acetaminophen (TYLENOL) suppository 650 mg    sodium chloride (NORMAL SALINE) 0.9 % bolus 500 mL    niCARdipine (CARDENE) 40 mg/200 mL in  NaCl infusion    furosemide (LASIX INJECT) injection 20 mg    labetalol (NORMODYNE) injection 10 mg    hydrALAZINE (APRESOLINE) injection 10 mg    lisinopril (ZESTRIL) tablet 20 mg    sodium chloride 0.9 % flush bag 25 mL    Magnesium Standard Replacement Protocol    Phosphorus Standard Replacement Protocol    Potassium Replacement (Levels 3.6 - 4)    Potassium Standard Replacement Protocol (Levels 3.5 and lower)       Physical Exam:  Vitals:  Vital Last Value 24 Hour Range   Temperature 97.8 °F (36.6 °C) (06/25/24 0800) Temp  Min: 97.6 °F (36.4 °C)  Max: 98.9 °F (37.2 °C)   Pulse 64 (06/25/24 0815) Pulse  Min: 52  Max: 135   Respiratory 15 (06/25/24 0815) Resp  Min: 8  Max: 26   Non-Invasive  Blood Pressure (!) 157/66 (06/25/24 0815) BP  Min: 132/108  Max: 200/80   Pulse Oximetry 97 % (06/25/24 0815) SpO2  Min: 89 %  Max: 98 %   Arterial   Blood Pressure   No data recorded      General:  Alert, cooperative, minimally conversive.  Neurological:   Mental status: The patient is alert, attentive, and oriented to place and self. Disorientated to date.   Language: Speech is clear and fluent with good repetition, comprehension, and naming.  Cranial nerves:   II: Visual fields are full to confrontation with unilateral stimulation in all quadrants. Pupils are 3 mm and briskly reactive to light.   III, IV, VI: At primary gaze, there is no eye deviation. EOMs (extraocular movements) are full, no nystagmus observed. No ptosis noted.   V: Facial sensation is intact to light touch bilaterally and symmetrically in V1-V3 trigeminal nerve distribution.   VII: Face is symmetric with normal eye closure and smile.   VIII: Hearing is normal to voice.   IX, X: Uvula midline. Palate elevates symmetrically. Voice is not hoarse.   XI: Head turning and shoulder shrug are intact.   XII: Tongue is midline with normal movements and no atrophy.  Motor: Muscle bulk and tone are normal.   RUE strength 4/5, no drift. LUE strength 5/5, no drift.    BLE proximal strength 2/5, strength 3/5 more distally (plantar/dorsi), downward drift to bed.   Sensory: Light touch sense is intact symetrically in BUE and BLE. No extinction noted.   Coordination:  There is no dysmetria on finger-to-nose. Did not attempt heel-to-shin d/t BLE weakness     NIH Stroke Scale:    1a.  Level of consciousness: 0=alert; keenly responsive   1b.  LOC questions:1=Answers one question correctly   1c.  LOC commands: 0=Performs both task correctly   2.    Best Gaze:0=normal   3.    Visual: 0=No visual loss   4.    Facial Palsy: 0=Normal symmetric movement   5a.  Motor left arm: 0=No drift, limb holds 90 (or 45) degrees for full 10 seconds   5b.  Motor right arm: 0=No drift, limb holds 90 (or 45) degrees for full 10 seconds   6a.  Motor left le=Some effect against gravity, limb cannot get to or maintain (if cured) 90 (or 45) degrees but drifts down before full 5 seconds, does not hit bed.    6b.  Motor right le=Some effect against gravity, limb cannot get to or maintain (if cured) 90 (or 45) degrees but drifts down before full 5 seconds, does not hit bed.    7.    Limb Ataxia: 0=Absent   8.    Sensory: 0=Normal; no sensory loss   9.    Best Language:0=No aphasia, normal  10.   Dysarthria: 0=Normal  11.   Extinction and Inattention: 0=No abnormality  Total NIHSS:  5     Skin: Warm and dry without rashes or wounds  Eyes:  Normal conjunctivae and sclerae.    ENT:  Oral and nasal mucous membranes are pink and moist.   Cardiovascular: No evidence of UE or LE edema.   Respiratory:  Normal respiratory effort.   Musculoskeletal:  No deformity noted.   Psychiatric:   Cooperative.  Appropriate mood and affect.     Lab Results   Component Value Date    HDL 60 2024    CALCLDL 37 2024    CHOLESTEROL 112 2024    TRIGLYCERIDE 76 2024    HGBA1C 6.1 (H) 2024    INR 1.1 2024    SODIUM 141 2024    POTASSIUM 3.2 (L) 2024    CHLORIDE 107 2024    CO2 26  06/25/2024    BUN 16 06/25/2024    CREATININE 0.49 (L) 06/25/2024    GLUCOSE 164 (H) 06/25/2024    MG 1.7 06/25/2024    WBC 6.5 06/25/2024    HGB 10.5 (L) 06/25/2024    HCT 32.7 (L) 06/25/2024     06/25/2024    PT 11.4 06/24/2024    PTT 28 06/24/2024    [id number] Detected (A) 10/18/2021       New studies: No new imaging at this time     Assessment/Plan:  #Acute L parietal lobe/posterior cingulate and frontal lobe infarcts within L RADHA territory. Etiology: bilateral carotid terminus occlusion with significant narrowing of bilateral cavernous segments concerning for possible moyamoya vs hypercoagulable state (lung nodule and reported recent unintentional weight loss). Work up in process   #Acute/subacute SAH along L frontal lobe with trace R posterior parafalcine SDH, no mass effect. Etiology: appears traumatic, but patient denies trauma/recent fall vs ischemic infarct with hemorrhagic transformation   #Bilateral carotid terminus occlusion with significant narrowing of the bilateral cavernous segments in the range of 80-90%  #Moderate narrowing of bilateral MCAs   #Moderate narrowing of bilateral ACAs  #Left lung apex irregular 8-9 mm nodule  #Hypertensive emergency   #NSTEMI Type II   #Chronic L corona radiata infarct   #DVT/PE on chronic anticoagulation  #HFrEF   #Paroxysmal atrial fibrillation (On Eliquis)   #HTN  #HLD  #T2DM  #CAD    Notable Stroke Risk Factors: Hypertension, Hyperlipidemia, Diabetes Mellitus, and Atrial Fibrillation     Continue stroke/TIA order set.   Stroke workup:   Recommend MRI Brain with contrast   Recommend consult to RYAN for DSA evaluation   Recommend further embolic work up with RICHELLE to rule out endocarditis   Recommend hypercoagulable lab workup    Telemetry monitoring.   Neuro checks per ICU   Antithrombotic recommendations:   Continue holding bASA and Eliquis at this time in setting of SAH and SDH. Restarting AC timing TBD.   Statin recommendations:   Continue atorvastatin 80  mg daily.   Recommend SBP <160  Please do not hesitate to contact our service with any questions or concerns.     Rossy is ready for discharge from my viewpoint: No  Workup needed prior to discharge:  MRI Brain W/ Contrast, possible DSA, possible RICHELLE    Medications changes at discharge:   TBD   Follow up appointments needed after discharge:   requested 6/25    Estimated Date of Discharge documented: 6/28/2024    Discussed with Dr. Dunn, Camille BURK, and patient.     Thank you for allowing to participate in the care of Rossy Guzman. We will continue to follow with you.      WM Haas  Stroke Nurse Practitioner   Pager: [phone]    The inpatient stroke APC is available on weekdays for non-urgent needs from [phone] at [phone] or page the number above.  Please page STAT team \"[id number]\" for acute/emergent or sustained neuro changes  From 1800 - 0600, the on-call Stroke and Neuroendovascular NP is available at [phone].     6/25/2024

## 2025-03-26 NOTE — TELEPHONE ENCOUNTER
Called pt and spoke to his daughter Ofelia, they are just concerned because he had kidney issues and he's never taken this medication, so they want to make sure that his kidneys are going to be monitored throughout the time he is taking this medication. Will the pt be getting blood test for kidney function consistently?     Pt hasn't taken the medication because of the kidney concerns, the daughter was also questioning the fact that the medication is taken once daily. What if he is still in pain will the medication work throughout the whole day?

## 2025-03-27 ENCOUNTER — TELEPHONE (OUTPATIENT)
Dept: ADMINISTRATIVE | Facility: OTHER | Age: 70
End: 2025-03-27

## 2025-03-27 NOTE — TELEPHONE ENCOUNTER
03/27/25 2:05 PM    Patient contacted to bring Advance Directive, POLST, or Living Will document to next scheduled pcp visit.[facility] Department left message. (Bring paperwork requested)    Thank you.  Babs Solano MA  [facility]

## 2025-03-28 ENCOUNTER — TELEMEDICINE (OUTPATIENT)
Dept: FAMILY MEDICINE CLINIC | Facility: CLINIC | Age: 70
End: 2025-03-28

## 2025-03-28 ENCOUNTER — NURSE TRIAGE (OUTPATIENT)
Age: 70
End: 2025-03-28

## 2025-03-28 ENCOUNTER — NURSE TRIAGE (OUTPATIENT)
Dept: OTHER | Facility: OTHER | Age: 70
End: 2025-03-28

## 2025-03-28 DIAGNOSIS — D63.8 ANEMIA OF CHRONIC DISEASE: ICD-10-CM

## 2025-03-28 DIAGNOSIS — N18.2 STAGE 2 CHRONIC KIDNEY DISEASE: ICD-10-CM

## 2025-03-28 DIAGNOSIS — S72.002S CLOSED FRACTURE OF LEFT HIP, SEQUELA: ICD-10-CM

## 2025-03-28 DIAGNOSIS — Z76.89 ENCOUNTER FOR SUPPORT AND COORDINATION OF TRANSITION OF CARE: Primary | ICD-10-CM

## 2025-03-28 DIAGNOSIS — E10.65 TYPE 1 DIABETES MELLITUS WITH HYPERGLYCEMIA (HCC): ICD-10-CM

## 2025-03-28 NOTE — ASSESSMENT & PLAN NOTE
Lab Results   Component Value Date    HGBA1C 8.3 (H) 01/19/2025     Pt follows with endocrinology on insulin therapy. Daughter reports recent low BS and has call  out to  endocrinologist, reports adjusting insulin.

## 2025-03-28 NOTE — TELEPHONE ENCOUNTER
----- Message from Marilin HUMMEL sent at 3/28/2025  2:27 PM EDT -----  Patient daughter call stated that her father  blood  sugars having been dropping  low during  the  night   44 55 .

## 2025-03-28 NOTE — ASSESSMENT & PLAN NOTE
Lab Results   Component Value Date    EGFR 85 02/17/2025    EGFR 77 02/16/2025    EGFR 90 02/15/2025    CREATININE 0.92 02/17/2025    CREATININE 0.99 02/16/2025    CREATININE 0.85 02/15/2025     Stable continue monitoring.  Orders:  •  Comprehensive metabolic panel; Future

## 2025-03-28 NOTE — PROGRESS NOTES
Virtual TCM Visit:Name: Karson You      : 1955      MRN: [id number]  Encounter Provider: NACHO Kenney  Encounter Date: 3/28/2025   Encounter department: [facility]  :  Assessment & Plan  Encounter for support and coordination of transition of care         Closed fracture of left hip, sequela  S/p L trochanteric IM nail fixation of IT fracture 2/15/25 with subsequent short term rehab -3/17/25. Pt was evaluated for home physical therapy will have [facility]. Reports continued pian not relieved with tylenol 1000  mg, was taking oxycodone 2.5 mg q 4hrs in rehab. Pt started on meloxicam 7.5 mg at bedtime with tylenol 1000 mg q 8 hrs prn. Can use diclofenac gel qid prn.   Orders:  •  Diclofenac Sodium (VOLTAREN) 1 %; Apply 2 g topically 4 (four) times a day    Stage 2 chronic kidney disease  Lab Results   Component Value Date    EGFR 85 2025    EGFR 77 2025    EGFR 90 02/15/2025    CREATININE 0.92 2025    CREATININE 0.99 2025    CREATININE 0.85 02/15/2025     Stable continue monitoring.  Orders:  •  Comprehensive metabolic panel; Future    Anemia of chronic disease  Suspect secondary to recent blood loss and hip surgery, encourage iron rich foods, recheck CBC  Orders:  •  CBC and differential; Future    Type 1 diabetes mellitus with hyperglycemia (HCC)    Lab Results   Component Value Date    HGBA1C 8.3 (H) 2025     Pt follows with endocrinology on insulin therapy. Daughter reports recent low BS and has call  out to  endocrinologist, reports adjusting insulin.                   History of Present Illness     Transitional Care Management Review:   Karson You is a 70 y.o. male here for TCM follow up.    During the TCM phone call patient stated:  TCM Call (since 3/14/2025)     None      TCM Call (since 3/14/2025)     None        TCM follow-up pt is s/p fall 25 with femur fracture requiring intramedullary nail and hospitalization  from 2/14-2/18/25. Pt was discharged to [facility] for Short term Rehab with discharge home 3/17/25. Pt reports continue L hip pain was taking oxycodone at rehab however was not sent home with medication. Daughter Zoe states pt has been taking tylenol 1000 mg daily without relief. Pt was prescribed meloxicam 7.5 mg however has not taken medication secondary to  history of CKD stage 5. Reviewed most recent labs pts' GFR 85.        Review of Systems   Constitutional:  Negative for activity change, appetite change, chills, diaphoresis, fatigue, fever and unexpected weight change.   HENT:  Negative for congestion, dental problem, drooling, ear discharge, ear pain, facial swelling, hearing loss, mouth sores, nosebleeds, postnasal drip, rhinorrhea, sinus pressure, sinus pain, sneezing, sore throat, tinnitus, trouble swallowing and voice change.    Eyes:  Negative for photophobia, pain, discharge, redness, itching and visual disturbance.   Respiratory:  Negative for apnea, cough, choking, chest tightness, shortness of breath, wheezing and stridor.    Cardiovascular:  Negative for chest pain, palpitations and leg swelling.   Gastrointestinal:  Negative for abdominal distention, abdominal pain, anal bleeding, blood in stool, constipation, diarrhea, nausea and vomiting.   Endocrine: Negative for cold intolerance, heat intolerance, polydipsia, polyphagia and polyuria.   Genitourinary:  Negative for decreased urine volume, difficulty urinating, dysuria, flank pain, frequency, hematuria, penile discharge, scrotal swelling, testicular pain and urgency.   Musculoskeletal:  Positive for arthralgias and gait problem. Negative for back pain, joint swelling, myalgias, neck pain and neck stiffness.   Skin:  Negative for color change, rash and wound.   Allergic/Immunologic: Negative for environmental allergies, food allergies and immunocompromised state.   Neurological:  Positive for speech difficulty. Negative for  dizziness, tremors, seizures, syncope, facial asymmetry, weakness, light-headedness, numbness and headaches.   Hematological:  Negative for adenopathy. Does not bruise/bleed easily.   Psychiatric/Behavioral:  Negative for agitation, behavioral problems, confusion, decreased concentration, dysphoric mood, hallucinations, self-injury, sleep disturbance and suicidal ideas. The patient is not nervous/anxious and is not hyperactive.    All other systems reviewed and are negative.    Objective   There were no vitals taken for this visit.    Physical Exam  Nursing note reviewed.   Constitutional:       General: He is not in acute distress.     Appearance: Normal appearance. He is not ill-appearing.   HENT:      Head: Normocephalic and atraumatic.   Eyes:      Conjunctiva/sclera: Conjunctivae normal.   Pulmonary:      Effort: Pulmonary effort is normal. No respiratory distress.   Neurological:      Mental Status: He is alert. Mental status is at baseline.      Gait: Gait abnormal (Wheel chair).   Psychiatric:         Mood and Affect: Mood normal.         Behavior: Behavior normal.       Medications have been reviewed by provider in current encounter    Administrative Statements   Encounter provider NACHO Kenney    The Patient is located at Home and in the following state in which I hold an active license [place].    The patient was identified by name and date of birth. Karson You was informed that this is a telemedicine visit and that the visit is being conducted through the Epic Embedded platform. He agrees to proceed..  My office door was closed. No one else was in the room.  He acknowledged consent and understanding of privacy and security of the video platform. The patient has agreed to participate and understands they can discontinue the visit at any time.    I have spent a total time of 17 minutes in caring for this patient on the day of the visit/encounter including Diagnostic results, Instructions for  management, Patient and family education, Importance of tx compliance, Risk factor reductions, Counseling / Coordination of care, Documenting in the medical record, and Reviewing/placing orders in the medical record (including tests, medications, and/or procedures), not including the time spent for establishing the audio/video connection.    NACHO Kenney

## 2025-03-28 NOTE — TELEPHONE ENCOUNTER
"Regarding: low blood sugars / currently 124 after food/soda/OJ  ----- Message from Selina SORENSON sent at 3/28/2025  5:53 PM EDT -----  \"I've been speaking with the endocrinology office about my dad's low blood sugars and they were supposed to get back to me bc I need to know what to do with him tonight. They should have all the notes from earlier but at 4pm when I got home his sugar was 88--I gave him OJ and in 20 minutes it was only up to 92, I held his insulin and gave him dinner with a half cup of soda and now it is 124. He gets his night insulin at 8pm and I need to know what to do bc for days his blood sugar has been dropping\"    "

## 2025-03-28 NOTE — ASSESSMENT & PLAN NOTE
Suspect secondary to recent blood loss and hip surgery, encourage iron rich foods, recheck CBC  Orders:  •  CBC and differential; Future

## 2025-03-28 NOTE — PATIENT COMMUNICATION
Daughter returning a call stating that they have been following the suggested cut back on insulin dose and still has been having low bg readings. Reports his Dexcom G7 is connected to the office- asking to download/review. Reported they needed to hold AM dose of Humalog today due to bg of 80 @ 0700 followed by reading of 44 @ 0900. Reports current bg at 1230pm was 189, denies symptoms. Reports taking Toujeo 300u/ml- 18u at bedtime, and Humalog 4u TID w/meals. Daughter also questioned Glucagon order stating it was not received at pharmacy- per daughter they do not use [facility] and would like Glucagon order to go to [facility] ASAP. Please advise, daughter is requesting a call back to discuss hypoglycemic episodes. Thank you

## 2025-03-28 NOTE — TELEPHONE ENCOUNTER
"FOLLOW UP: please f/u with alfonzo Monday regarding how blood sugars were over the weekend    REASON FOR CONVERSATION: Hypoglycemia - no symptoms    SYMPTOMS: daughter reports low at 9 am of 44 (they did confirm w fingerstick). BG was 88 around 4pm which she feels is low for him so she held his mealtime insulin, now its up 180s-200. She is wondering what to do about his glargine. Per Alfonzo- if his sugar gets below 120 she knows he's going to have a low.    OTHER: did reduce meal time insulin by 2 units as instructed 3/26    DISPOSITION:  Now (overriding Call PCP Now) Per Dr Gordon on call    - reduce glargine to 12 units nightly  - if pre meal glucose <90, only give 1/2 of insulin dose with meal  - if continuing to have lows after meals, reduce mealtime insulin by another 2 units for next meal    Relayed to Alfonzo who verbalized understanding and appreciation. Will call back if continues to have lows.    Reason for Disposition   [1] Caller has URGENT medication or insulin device (e.g., pump, continuous monitoring) question AND [2] triager unable to answer question    Answer Assessment - Initial Assessment Questions  1. SYMPTOMS: \"What symptoms are you concerned about?\"      Low blood sugars x2 today, 44 at 9 am and 88 around 4pm    2. ONSET:  \"When did the symptoms start?\"      Has been experiencing this for the last couple of days    3. BLOOD GLUCOSE: \"What is your blood glucose level?\"       Currently 180-200s    4. USUAL RANGE: \"What is your blood glucose level usually?\" (e.g., usual fasting morning value, usual evening value)      Usually 120s-150s    5. TYPE 1 or 2:  \"Do you know what type of diabetes you have?\"  (e.g., Type 1, Type 2, Gestational; doesn't know)       TDM    6. INSULIN: \"Do you take insulin?\" \"What type of insulin(s) do you use? What is the mode of delivery? (syringe, pen; injection or pump) \"When did you last give yourself an insulin dose?\" (i.e., time or hours/minutes ago) " "\"How much did you give?\" (i.e., how many units)      Pre meal lispro      Glargine 18 units at bedtime    7. DIABETES PILLS: \"Do you take any pills for your diabetes?\" If Yes, ask: \"What is the name of the medicine(s) that you take for high blood sugar?\"      Taking hydrocortisone and florinef also    8. OTHER SYMPTOMS: \"Do you have any symptoms?\" (e.g., fever, frequent urination, difficulty breathing, vomiting)      Does not get symptoms with hypoglycemia    9. LOW BLOOD GLUCOSE TREATMENT: \"What have you done so far to treat the low blood glucose level?\"      Gave OJ and soda, held meal time insulin    10. FOOD: \"When did you last eat or drink?\"        Around 5:30 pm    11. ALONE: \"Are you alone right now or is someone with you?\"         With daughter        High today 206  185 10:30 pm last night  148 at 2:30 am  104 at 6 am  44 at 9 am  207 at lunch  88- around 4pm    Protocols used: Diabetes - Low Blood Sugar-Adult-AH    "

## 2025-03-28 NOTE — ASSESSMENT & PLAN NOTE
S/p L trochanteric IM nail fixation of IT fracture 2/15/25 with subsequent short term rehab 2/18-3/17/25. Pt was evaluated for home physical therapy will have [facility] services. Reports continued pian not relieved with tylenol 1000  mg, was taking oxycodone 2.5 mg q 4hrs in rehab. Pt started on meloxicam 7.5 mg at bedtime with tylenol 1000 mg q 8 hrs prn. Can use diclofenac gel qid prn.   Orders:  •  Diclofenac Sodium (VOLTAREN) 1 %; Apply 2 g topically 4 (four) times a day

## 2025-04-01 ENCOUNTER — TREATMENT (OUTPATIENT)
Dept: ENDOCRINOLOGY | Facility: CLINIC | Age: 70
End: 2025-04-01

## 2025-04-01 DIAGNOSIS — E10.65 TYPE 1 DIABETES MELLITUS WITH HYPERGLYCEMIA (HCC): Primary | ICD-10-CM

## 2025-04-01 NOTE — PROGRESS NOTES
CGM data review::  Device: dexcom Dates: 3/15/25-3/28/25 Usage: 93 % Av glu: 187 mg/dL  SD:  mg/dL CV: 33.5 % GMI: 7.8 %  TIR: 47 %  TAR: 31+20 %  TBR: 2 %    Glycemic patters:  based on dexcom lowest glucose levels happen early morning. Suggest reduce Toujeo by 2 units until early morning numbers move into 85-110mg/dL range.

## 2025-04-04 DIAGNOSIS — E10.65 TYPE 1 DIABETES MELLITUS WITH HYPERGLYCEMIA (HCC): ICD-10-CM

## 2025-04-04 RX ORDER — IBUPROFEN 600 MG/1
1 TABLET ORAL AS NEEDED
Qty: 1 KIT | Refills: 1 | Status: SHIPPED | OUTPATIENT
Start: 2025-04-04

## 2025-04-04 RX ORDER — IBUPROFEN 600 MG/1
1 TABLET ORAL AS NEEDED
Qty: 1 KIT | Refills: 1 | Status: SHIPPED | OUTPATIENT
Start: 2025-04-04 | End: 2025-04-04 | Stop reason: SDUPTHER

## 2025-04-08 ENCOUNTER — TELEPHONE (OUTPATIENT)
Age: 70
End: 2025-04-08

## 2025-04-08 NOTE — TELEPHONE ENCOUNTER
[facility] Called. Reports patient had a fall this morning. NO injuries or trauma. Pt is stable.        Please review.  Thank you

## 2025-04-11 ENCOUNTER — TELEPHONE (OUTPATIENT)
Dept: ENDOCRINOLOGY | Facility: CLINIC | Age: 70
End: 2025-04-11

## 2025-04-11 NOTE — TELEPHONE ENCOUNTER
Daughter called in to [facility] appt due to a death in the family and they have to reschedule today's virtual visit. Advised sched was booking out and she stated provider is the one who requested visit.  Pt needs virtual has he broke his hip.  Told her I will reach out to provider to see when pt can have visit, then call her back once I had the info.     Please provide a time when this virtual can be done as there are no openings as of this message.

## 2025-04-15 ENCOUNTER — TREATMENT (OUTPATIENT)
Dept: ENDOCRINOLOGY | Facility: CLINIC | Age: 70
End: 2025-04-15
Payer: COMMERCIAL

## 2025-04-15 DIAGNOSIS — E10.65 TYPE 1 DIABETES MELLITUS WITH HYPERGLYCEMIA (HCC): Primary | ICD-10-CM

## 2025-04-15 PROCEDURE — 95251 CONT GLUC MNTR ANALYSIS I&R: CPT | Performed by: INTERNAL MEDICINE

## 2025-04-15 NOTE — PROGRESS NOTES
CGM data review::  Device: dexcom          Dates:  1225-4/15/25           Usage: 80 %   Av glu: 239 mg/dL                   SD: 61 mg/dL            CV: 25.5 %      GMI: 9 %  TIR: 17 %  TAR: 38+45 %             TBR:0 %     Glycemic patters:  dexcom shows significant high glucose levels. Suggest continue toujeo at current dose and use the correction scale humalog. Goal glucose is 85-200mg/dL range.      INSULIN DOSAGE INSTRUCTIONS    Name: Karson You                        : 1955  MRN #: [id number]    Your Current Insulin  and dose is: Before Breakfast Before Lunch Before Evening Meal Bedtime     Humalog Insulin   7u   7u   7u    Regular, Apidra, Humalog orNovolog Sliding Scale:   <80              151-200 + 1 +1 +1    201-250 +2 +2 +2 +   251-300 +3 +3 +3 +   301-350 +4 +4 +4 +   >350 +5 +5 +5 +       Toujeo 16u        Additional Instructions:   Please test your blood sugar: 4 times daily- before meals and bedtime OR use a glucose sensor.  Target Blood sugar range _85_to _200__.  Call if your NACHO Kenney  blood sugar is less than _60_ or greater than _400__.    Today's Date: 4/15/2025

## 2025-04-17 DIAGNOSIS — S72.002S CLOSED FRACTURE OF LEFT HIP, SEQUELA: ICD-10-CM

## 2025-04-18 RX ORDER — MELOXICAM 7.5 MG/1
7.5 TABLET ORAL DAILY
Qty: 30 TABLET | Refills: 0 | Status: SHIPPED | OUTPATIENT
Start: 2025-04-18

## 2025-04-19 DIAGNOSIS — S72.002D CLOSED FRACTURE OF LEFT HIP WITH ROUTINE HEALING, SUBSEQUENT ENCOUNTER: Primary | ICD-10-CM

## 2025-04-23 ENCOUNTER — OFFICE VISIT (OUTPATIENT)
Dept: OBGYN CLINIC | Facility: HOSPITAL | Age: 70
End: 2025-04-23

## 2025-04-23 ENCOUNTER — HOSPITAL ENCOUNTER (OUTPATIENT)
Dept: RADIOLOGY | Facility: HOSPITAL | Age: 70
Discharge: HOME | End: 2025-04-23
Attending: ORTHOPAEDIC SURGERY
Payer: COMMERCIAL

## 2025-04-23 ENCOUNTER — TELEMEDICINE (OUTPATIENT)
Dept: ENDOCRINOLOGY | Facility: CLINIC | Age: 70
End: 2025-04-23
Payer: COMMERCIAL

## 2025-04-23 ENCOUNTER — VBI (OUTPATIENT)
Dept: ADMINISTRATIVE | Facility: OTHER | Age: 70
End: 2025-04-23

## 2025-04-23 VITALS — BODY MASS INDEX: 32.1 KG/M2 | HEIGHT: 64 IN | WEIGHT: 188 LBS

## 2025-04-23 DIAGNOSIS — S72.002D CLOSED FRACTURE OF LEFT HIP WITH ROUTINE HEALING, SUBSEQUENT ENCOUNTER: Primary | ICD-10-CM

## 2025-04-23 DIAGNOSIS — E10.65 TYPE 1 DIABETES MELLITUS WITH HYPERGLYCEMIA (HCC): Primary | ICD-10-CM

## 2025-04-23 DIAGNOSIS — S72.002D CLOSED FRACTURE OF LEFT HIP WITH ROUTINE HEALING, SUBSEQUENT ENCOUNTER: ICD-10-CM

## 2025-04-23 DIAGNOSIS — E27.40 ADRENAL INSUFFICIENCY (HCC): ICD-10-CM

## 2025-04-23 DIAGNOSIS — E78.2 MIXED HYPERLIPIDEMIA: ICD-10-CM

## 2025-04-23 DIAGNOSIS — N18.5 CHRONIC KIDNEY DISEASE, STAGE 5 (HCC): ICD-10-CM

## 2025-04-23 DIAGNOSIS — E55.9 VITAMIN D DEFICIENCY: ICD-10-CM

## 2025-04-23 DIAGNOSIS — I50.32 CHRONIC DIASTOLIC HEART FAILURE (HCC): ICD-10-CM

## 2025-04-23 DIAGNOSIS — M80.0B2A AGE-RELATED OSTEOPOROSIS WITH CURRENT PATHOLOGICAL FRACTURE, LEFT PELVIS, INITIAL ENCOUNTER FOR FRACTURE: ICD-10-CM

## 2025-04-23 PROCEDURE — 99215 OFFICE O/P EST HI 40 MIN: CPT | Performed by: INTERNAL MEDICINE

## 2025-04-23 PROCEDURE — 95251 CONT GLUC MNTR ANALYSIS I&R: CPT | Performed by: INTERNAL MEDICINE

## 2025-04-23 PROCEDURE — G2211 COMPLEX E/M VISIT ADD ON: HCPCS | Performed by: INTERNAL MEDICINE

## 2025-04-23 PROCEDURE — 72170 X-RAY EXAM OF PELVIS: CPT

## 2025-04-23 PROCEDURE — 73552 X-RAY EXAM OF FEMUR 2/>: CPT

## 2025-04-23 PROCEDURE — 99024 POSTOP FOLLOW-UP VISIT: CPT | Performed by: ORTHOPAEDIC SURGERY

## 2025-04-23 RX ORDER — INSULIN LISPRO 100 [IU]/ML
INJECTION, SOLUTION INTRAVENOUS; SUBCUTANEOUS
Qty: 18 ML | Refills: 1 | Status: SHIPPED | OUTPATIENT
Start: 2025-04-23

## 2025-04-23 RX ORDER — INSULIN GLARGINE 300 U/ML
14 INJECTION, SOLUTION SUBCUTANEOUS
Qty: 3 ML | Refills: 2 | Status: SHIPPED | OUTPATIENT
Start: 2025-04-23

## 2025-04-23 RX ORDER — ACYCLOVIR 400 MG/1
1 TABLET ORAL
Qty: 9 EACH | Refills: 1 | Status: SHIPPED | OUTPATIENT
Start: 2025-04-23 | End: 2025-04-30 | Stop reason: SDUPTHER

## 2025-04-23 RX ORDER — PEN NEEDLE, DIABETIC 32GX 5/32"
NEEDLE, DISPOSABLE MISCELLANEOUS
Qty: 200 EACH | Refills: 5 | Status: SHIPPED | OUTPATIENT
Start: 2025-04-23

## 2025-04-23 RX ORDER — LIDOCAINE 50 MG/G
1 PATCH TOPICAL DAILY PRN
Qty: 9 PATCH | Refills: 1 | Status: SHIPPED | OUTPATIENT
Start: 2025-04-23

## 2025-04-23 RX ORDER — HYDROCORTISONE 5 MG/1
TABLET ORAL
Qty: 150 TABLET | Refills: 6 | Status: SHIPPED | OUTPATIENT
Start: 2025-04-23

## 2025-04-23 RX ORDER — METHOCARBAMOL 500 MG/1
500 TABLET, FILM COATED ORAL 3 TIMES DAILY PRN
Qty: 30 TABLET | Refills: 1 | Status: SHIPPED | OUTPATIENT
Start: 2025-04-23

## 2025-04-23 NOTE — ASSESSMENT & PLAN NOTE
He is better with no more hypoglycemia but significant hyperglycemia.     We agreed to slightly increase basal insulin and keep prandial same as listed.  We also reduced hydrocortisone.    Your Current Insulin  and dose is: Before Breakfast Before Lunch Before Evening Meal Bedtime      Humalog Insulin    4u    4u    4u     Regular, Apidra, Humalog orNovolog Sliding Scale:   <80                      151-200 + 1 +1 +1     201-250 +2 +2 +2 +   251-300 +3 +3 +3 +   301-350 +4 +4 +4 +   >350 +5 +5 +5 +      Toujeo      14u      In future, we may consider further insulin reduction as tolerated.  Follow up in 3 months in person.      Lab Results   Component Value Date    HGBA1C 8.3 (H) 01/19/2025

## 2025-04-23 NOTE — PROGRESS NOTES
Administrative Statements   Encounter provider Lewis Butts MD    The Patient is located at Home and in the following state in which I hold an active license [place].    The patient was identified by name and date of birth. Audrey Rodney was informed that this is a telemedicine visit and that the visit is being conducted through the [employer] platform. She agreed to proceed.  My office door was closed. No one else was in the room.  She acknowledged consent and understanding of privacy and security of the video platform. The patient has agreed to participate and understands they can discontinue the visit at any time.      Follow-up Patient Progress Note         CC: type 1 diabetes     History of Present Illness:   67 yr male with Type 1 diabetes since age 39yr with brittle diabetes, hypertension, obesity, hyperlipidemia, atrial fibrillation, orthostasis, anxiety/depression, anemia, adrenal insufficiency, fall with hip fracture and coronary artery disease.  Last visit was 4/30/2024.     He seems to be doing well. Weight was 167 lbs.     CGM data review::  Device: dexcom          Dates:  4/10/25-4/23/25             Usage: 77 %   Av glu: 220 mg/dL                   SD: 64 mg/dL      CV:  28.9 %            GMI: 8.6  %  TIR: 30 % TAR: 37+33 %             TBR: 0 %     Glycemic patters:  predominantly hyperglycemia fasting and postprandial. Rare pp hpoglycemia     Current meds:  Toujeo 12u QHS  Humalog KwikPen 4u TIDAC plus  1u/50mg/dL above 150mg/dL.    Hydrocortisone 20 mg am and 15 mg pm dose (aware to double dose for acute stressful state)  Fludrocortisone 0.2 mg daily     Opthamology: yes  Podiatry: no  vaccination: yes  Dental:  Pancreatitis: no     Ace/ARB: no  Statin:  Lipitor  Thyroid issues:  No    Physical Exam:  There is no height or weight on file to calculate BMI.  There were no vitals taken for this visit.   There were no vitals filed for this visit.     Physical Exam  Constitutional:       General:  He is not in acute distress.     Appearance: He is well-developed.   HENT:      Head: Normocephalic and atraumatic.      Nose: Nose normal.   Eyes:      Conjunctiva/sclera: Conjunctivae normal.   Pulmonary:      Effort: Pulmonary effort is normal.   Abdominal:      General: There is no distension.   Musculoskeletal:      Cervical back: Normal range of motion and neck supple.   Skin:     Findings: No rash.      Comments: No icterus   Neurological:      Mental Status: He is alert and oriented to person, place, and time.         Labs:   Lab Results   Component Value Date    HGBA1C 8.3 (H) 01/19/2025       Lab Results   Component Value Date    UFN7NHYIUOCZ 0.828 01/18/2025    TSH 0.50 08/17/2022       Lab Results   Component Value Date    CREATININE 0.92 02/17/2025    CREATININE 0.99 02/16/2025    CREATININE 0.85 02/15/2025    BUN 19 02/17/2025    K 3.2 (L) 02/17/2025     02/17/2025    CO2 31 02/17/2025     GFR, Calculated   Date Value Ref Range Status   12/01/2020 56 (L) >60 mL/min/1.73m2 Final     Comment:     mL/min per 1.73 square meters                                            Normal Function or Mild Renal    Disease (if clinically at risk):  >or=60  Moderately Decreased:                30-59  Severely Decreased:                  15-29  Renal Failure:                         <15                                            -American GFR: multiply reported GFR by 1.16    Please note that the eGFR is based on the CKD-EPI calculation, and is not intended to be used for drug dosing.                                            Note: Calculated GFR may not be an accurate indicator of renal function if the patient's renal function is not in a steady state.     eGFRcr   Date Value Ref Range Status   01/05/2024 77 >59 Final     eGFR   Date Value Ref Range Status   02/17/2025 85 ml/min/1.73sq m Final       Lab Results   Component Value Date    ALT 19 01/21/2025    AST 19 01/21/2025    ALKPHOS 67 01/21/2025  "      No results found for: \"CHOLESTEROL\"  No results found for: \"HDL\"  No results found for: \"TRIG\"  No results found for: \"NONHDLC\"      Assessment/Plan:    1. Type 1 diabetes mellitus with hyperglycemia (HCC)  Assessment & Plan:  He is better with no more hypoglycemia but significant hyperglycemia.     We agreed to slightly increase basal insulin and keep prandial same as listed.  We also reduced hydrocortisone.    Your Current Insulin  and dose is: Before Breakfast Before Lunch Before Evening Meal Bedtime      Humalog Insulin    4u    4u    4u     Regular, Apidra, Humalog orNovolog Sliding Scale:   <80                      151-200 + 1 +1 +1     201-250 +2 +2 +2 +   251-300 +3 +3 +3 +   301-350 +4 +4 +4 +   >350 +5 +5 +5 +      Toujeo      14u      In future, we may consider further insulin reduction as tolerated.  Follow up in 3 months in person.      Lab Results   Component Value Date    HGBA1C 8.3 (H) 01/19/2025     Orders:  -     Continuous Glucose Sensor (Dexcom G7 Sensor); Use 1 Device every 10 days  -     Insulin Pen Needle (BD Pen Needle Josie 2nd Gen) 32G X 4 MM MISC; FOR USE WITH INSULIN PEN. PHARMACY MAY DISPENSE BRAND COVERED BY INSURANCE.  -     insulin lispro (HumALOG/ADMELOG) 100 units/mL injection; Use 4u TIDAC plus 1u/50mg above 150mg/dL; max 25u /day  -     insulin glargine (Toujeo Max SoloStar) 300 units/mL CONCENTRATED U-300 injection pen (2-unit dial); Inject 14 Units under the skin daily at bedtime  -     Hemoglobin A1C; Future  2. Adrenal insufficiency (HCC)  Assessment & Plan:  He had a fall with pelvic fracture - treat as fragility fracture.    We agreed to reduce Hydrocortisone 15mg Am and 10mg pm plus fludrocorisone 0.2mg daily.    Double dose for acute sickness/stress.    Follow up in 3-6 months.  Orders:  -     hydrocortisone (CORTEF) 5 mg tablet; USE 3 TABLETS IN MORNING AND 2 TABLETS DAILY AFTERNOON  3. Chronic kidney disease, stage 5 (HCC)  4. Mixed hyperlipidemia  5. Vitamin " D deficiency  6. Chronic diastolic heart failure (HCC)  Assessment & Plan:  Would avoid SGLT2i in context of hypotension risk.    Wt Readings from Last 3 Encounters:   04/23/25 85.3 kg (188 lb)   03/12/25 85.3 kg (188 lb)   02/14/25 85.4 kg (188 lb 4.4 oz)             7. Age-related osteoporosis with current pathological fracture, left pelvis, initial encounter for fracture  Assessment & Plan:  He had a recent fall with a fragility fracture of hip. Given long term steroid use, we should aim to start medical therapy.    Today we discussed all aspects of osteoporosis including pathophysiology, risk factors, complications, diet, calcium 1200mg/day, vitamin D supplementation to maintain goal >30ng/dL, lifestyle modifications, medical fitness training, goals of therapy, follow up needs and medications including Alendronate, zolendronate, denosumab, romosozumab, foreo and tymlos.    We agreed to get a DXA and bone profile labs and start probably prolia next visit.  Follow up in 3 months.  Orders:  -     Vitamin D 25 hydroxy; Future  -     PTH, intact; Future  -     Phosphorus; Future  -     Comprehensive metabolic panel; Future  -     DXA bone density spine hip and pelvis; Future; Expected date: 04/23/2025        I have spent a total time of 40 minutes on 04/23/25 in caring for this patient including greater than 50% of this time was spent in counseling/coordination of care as listed above.       Discussed with the patient and all questioned fully answered. He will contact me with concerns.    Lewis Butts

## 2025-04-23 NOTE — ASSESSMENT & PLAN NOTE
He had a fall with pelvic fracture - treat as fragility fracture.    We agreed to reduce Hydrocortisone 15mg Am and 10mg pm plus fludrocorisone 0.2mg daily.    Double dose for acute sickness/stress.    Follow up in 3-6 months.

## 2025-04-23 NOTE — ASSESSMENT & PLAN NOTE
He had a recent fall with a fragility fracture of hip. Given long term steroid use, we should aim to start medical therapy.    Today we discussed all aspects of osteoporosis including pathophysiology, risk factors, complications, diet, calcium 1200mg/day, vitamin D supplementation to maintain goal >30ng/dL, lifestyle modifications, medical fitness training, goals of therapy, follow up needs and medications including Alendronate, zolendronate, denosumab, romosozumab, foreo and tymlos.    We agreed to get a DXA and bone profile labs and start probably prolia next visit.  Follow up in 3 months.

## 2025-04-23 NOTE — PATIENT INSTRUCTIONS
INSULIN DOSAGE INSTRUCTIONS     Name: Karson You                        : 1955  MRN #: [id number]     Your Current Insulin  and dose is: Before Breakfast Before Lunch Before Evening Meal Bedtime      Humalog Insulin    4u    4u    4u     Regular, Apidra, Humalog orNovolog Sliding Scale:   <80                      151-200 + 1 +1 +1     201-250 +2 +2 +2 +   251-300 +3 +3 +3 +   301-350 +4 +4 +4 +   >350 +5 +5 +5 +         Toujeo      14u       Additional Instructions:   Please test your blood sugar: 4 times daily- before meals and bedtime OR use a glucose sensor.  Target Blood sugar range _85_to _200__.  Call if your NACHO Kenney  blood sugar is less than _60_ or greater than _400__.

## 2025-04-23 NOTE — ASSESSMENT & PLAN NOTE
9+ weeks s/p left trochanteric IM nail of IT fracture, 2/15/2025  Current symptoms of lateral hip pain  Continue home physical therapy  Lidocaine patch prescribed  for local pain control  Methocarbamol prescribed  for muscle pain  Follow up in 2 months

## 2025-04-23 NOTE — PROGRESS NOTES
Name: Karson You      : 1955       MRN: [id number]   Encounter Provider: Alfie Chavez MD   Encounter Date: 25  Encounter department: [facility] [place]         Assessment & Plan  Closed fracture of left hip with routine healing, subsequent encounter  9+ weeks s/p left trochanteric IM nail of IT fracture, 2/15/2025  Current symptoms of lateral hip pain  Continue home physical therapy  Lidocaine patch prescribed  for local pain control  Methocarbamol prescribed  for muscle pain  Follow up in 2 months                To do next visit:  Repeat x-rays    _____________________________________________________  CHIEF COMPLAINT:  Chief Complaint   Patient presents with    Left Leg - Post-op         SUBJECTIVE:  Karson You is a 70 y.o. male who presents 9+ weeks s/p left trochanteric IM nail of IT fracture, 2/15/2025.  He currently lives at his home.  He is here with his daughter Ofelia.  He is doing the same.  Today he complains of left lateral hip pain.  He does participate in home physical therapy and does walk with walker.  He is in wheel chair in office today.  He does use Tylenol and has had Mobic prescribed by PCP yet daughter states the patient is in pain.            PAST MEDICAL HISTORY:  Past Medical History:   Diagnosis Date    A-fib ([facility])     Adrenal insufficiency ([facility])     Atrial fibrillation ([facility])     Diabetes mellitus ([facility])     History of stroke 10/19/2022    Obesity, morbid ([facility]) 2022    Shock ([facility]) 2023    Stroke ([facility])     Type 2 MI (myocardial infarction) ([facility]) 2022       PAST SURGICAL HISTORY:  Past Surgical History:   Procedure Laterality Date    CARPAL TUNNEL RELEASE      ID OPTX FEM SHFT FX W/INSJ IMED IMPLT W/WO SCREW Left 2/15/2025    Procedure: INSERTION NAIL IM FEMUR ANTEGRADE (TROCHANTERIC);  Surgeon: Alfie Chavez MD;  Location: [facility];  Service: Orthopedics       FAMILY HISTORY:  Family History   Problem Relation  Age of Onset    Heart disease Mother     Cancer Father     Multiple sclerosis Sister        SOCIAL HISTORY:  Social History     Tobacco Use    Smoking status: Former     Current packs/day: 0.25     Average packs/day: 0.3 packs/day for 30.0 years (7.5 ttl pk-yrs)     Types: Cigarettes    Smokeless tobacco: Never   Vaping Use    Vaping status: Never Used   Substance Use Topics    Alcohol use: Not Currently     Alcohol/week: 5.0 standard drinks of alcohol     Types: 5 Cans of beer per week     Comment: Quit in august 2022    Drug use: Never       MEDICATIONS:    Current Outpatient Medications:     acetaminophen (TYLENOL) 325 mg tablet, Take 3 tablets (975 mg total) by mouth every 6 (six) hours as needed for mild pain, Disp: 270 tablet, Rfl: 0    albuterol (PROVENTIL HFA,VENTOLIN HFA) 90 mcg/act inhaler, Inhale 2 puffs every 4 (four) hours as needed for wheezing, Disp: , Rfl:     apixaban (Eliquis) 5 mg, Take 1 tablet (5 mg total) by mouth 2 (two) times a day, Disp: 60 tablet, Rfl: 3    aspirin 81 mg chewable tablet, Chew 81 mg daily, Disp: , Rfl:     atorvastatin (LIPITOR) 10 mg tablet, Take 1 tablet by mouth daily, Disp: , Rfl:     Continuous Blood Gluc  (Dexcom G7 ) CYN, Use 1 each continuous, Disp: 1 each, Rfl: 0    Continuous Glucose Sensor (Dexcom G7 Sensor), Use 1 Device every 10 days, Disp: 9 each, Rfl: 1    Diclofenac Sodium (VOLTAREN) 1 %, Apply 2 g topically 4 (four) times a day, Disp: 100 g, Rfl: 3    fludrocortisone (FLORINEF) 0.1 mg tablet, TAKE 2 TABLETS BY MOUTH EVERY DAY, Disp: 180 tablet, Rfl: 1    furosemide (LASIX) 20 mg tablet, Take 1 tablet (20 mg total) by mouth daily, Disp: 30 tablet, Rfl: 2    Glucagon, rDNA, (Glucagon Emergency) 1 MG KIT, Inject 1 mg as directed if needed (for glucose <50mg/dL), Disp: 1 kit, Rfl: 1    hydrocortisone (CORTEF) 5 mg tablet, USE 4 TABLETS IN MORNING AND 3 TABLETS DAILY AFTERNOON, Disp: 210 tablet, Rfl: 6    insulin glargine (Toujeo Max SoloStar)  300 units/mL CONCENTRATED U-300 injection pen (2-unit dial), Inject 18 Units under the skin daily at bedtime, Disp: 6 mL, Rfl: 1    insulin lispro (HumALOG/ADMELOG) 100 units/mL injection, Inject 6 Units under the skin 3 (three) times a day with meals, Disp: 18 mL, Rfl: 1    Insulin Pen Needle (BD Pen Needle Josie 2nd Gen) 32G X 4 MM MISC, For use with insulin pen 4 times daily. Pharmacy may dispense brand covered by insurance., Disp: 200 each, Rfl: 3    Insulin Pen Needle (BD Pen Needle Josie 2nd Gen) 32G X 4 MM MISC, FOR USE WITH INSULIN PEN. PHARMACY MAY DISPENSE BRAND COVERED BY INSURANCE., Disp: 100 each, Rfl: 5    Magnesium Oxide 400 MG CAPS, Take 1 tablet (400 mg total) by mouth 2 (two) times a day, Disp: 180 tablet, Rfl: 3    meloxicam (MOBIC) 7.5 mg tablet, TAKE 1 TABLET BY MOUTH EVERY DAY., Disp: 30 tablet, Rfl: 0    potassium chloride (Klor-Con) 10 mEq tablet, Take 1 tablet (10 mEq total) by mouth in the morning, Disp: 30 tablet, Rfl: 0    senna-docusate sodium (SENOKOT S) 8.6-50 mg per tablet, Take 1 tablet by mouth daily at bedtime, Disp: 30 tablet, Rfl: 0    sertraline (ZOLOFT) 100 mg tablet, Take 100 mg by mouth daily, Disp: , Rfl:     oxyCODONE (ROXICODONE) 5 immediate release tablet, Take 0.5 tablets (2.5 mg total) by mouth every 4 (four) hours as needed for severe pain for up to 10 doses Max Daily Amount: 15 mg (Patient not taking: Reported on 4/23/2025), Disp: 0.5 tablet, Rfl: 0    ALLERGIES:  Allergies   Allergen Reactions    Imipramine Other (See Comments)    Lisinopril Other (See Comments)    Paroxetine Other (See Comments)    Penicillins Hives    Rosiglitazone Other (See Comments)    Troglitazone Other (See Comments)       LABS:  HgA1c:   Lab Results   Component Value Date    HGBA1C 8.3 (H) 01/19/2025     BMP:   Lab Results   Component Value Date    GLUCOSE 247 (H) 12/18/2023    CALCIUM 7.9 (L) 02/17/2025    K 3.2 (L) 02/17/2025    CO2 31 02/17/2025     02/17/2025    BUN 19 02/17/2025     "CREATININE 0.92 02/17/2025     CBC: No components found for: \"CBC\"    _____________________________________________________  PHYSICAL EXAMINATION:  Vital signs: Ht 5' 4\" (1.626 m)   Wt 85.3 kg (188 lb) Comment: pt in wheelchair  BMI 32.27 kg/m²   General: No acute distress, awake and alert  Psychiatric: Mood and affect appear appropriate  HEENT: Trachea Midline, No torticollis, no apparent facial trauma  Cardiovascular: No audible murmurs; Extremities appear perfused  Pulmonary: No audible wheezing or stridor  Skin: No open lesions; see further details (if any) below    MUSCULOSKELETAL EXAMINATION:  Left hip:  Patient in wheel chair  TTP over lateral hip and trochanteric bursa  Well healed incisional scars  No erythema or ecchymosis  Patient able to stand from seated position with no assistance  Calf compartments soft and supple  Sensation intact  Toes are warm sensate and mobile        _____________________________________________________  STUDIES REVIEWED:  I personally reviewed the images obtained in office today and my independent interpretation is as follows:    Left hip x-ray:  Interval healing trochanteric fracture with increased callus formation.  No hardware failure or acute changes.      PROCEDURES PERFORMED:  Procedures        Scribe Attestation      I,:  Jamar Mims MA am acting as a scribe while in the presence of the attending physician.:       I,:  Alfie Chavez MD personally performed the services described in this documentation    as scribed in my presence.:           "

## 2025-04-23 NOTE — TELEPHONE ENCOUNTER
04/23/25 1:19 PM     Chart reviewed for Medication Reconciliation Post Discharge - [employer] was/were submitted to the patient's insurance.     Shruthi Yao MA   [facility]

## 2025-04-23 NOTE — ASSESSMENT & PLAN NOTE
Would avoid SGLT2i in context of hypotension risk.    Wt Readings from Last 3 Encounters:   04/23/25 85.3 kg (188 lb)   03/12/25 85.3 kg (188 lb)   02/14/25 85.4 kg (188 lb 4.4 oz)

## 2025-04-24 ENCOUNTER — TELEPHONE (OUTPATIENT)
Age: 70
End: 2025-04-24

## 2025-04-24 NOTE — TELEPHONE ENCOUNTER
PA for LIDO 5% PATCHES DENIED    Reason:(Screenshot if applicable)  PT DOES NOT COVER DX    Must be post herpetic neuralgia  Cancer pain  Diabetic neuropathy         Message sent to office clinical pool Yes

## 2025-04-30 DIAGNOSIS — E27.40 ADRENAL INSUFFICIENCY (HCC): ICD-10-CM

## 2025-04-30 DIAGNOSIS — E10.65 TYPE 1 DIABETES MELLITUS WITH HYPERGLYCEMIA (HCC): ICD-10-CM

## 2025-04-30 RX ORDER — ACYCLOVIR 400 MG/1
1 TABLET ORAL
Qty: 9 EACH | Refills: 1 | Status: SHIPPED | OUTPATIENT
Start: 2025-04-30

## 2025-05-01 RX ORDER — FLUDROCORTISONE ACETATE 0.1 MG/1
0.2 TABLET ORAL DAILY
Qty: 180 TABLET | Refills: 1 | Status: SHIPPED | OUTPATIENT
Start: 2025-05-01

## 2025-05-05 DIAGNOSIS — E78.2 MIXED HYPERLIPIDEMIA: Primary | ICD-10-CM

## 2025-05-05 DIAGNOSIS — F99 PSYCHIATRIC DISORDER: ICD-10-CM

## 2025-05-05 DIAGNOSIS — I50.32 CHRONIC DIASTOLIC HEART FAILURE (HCC): ICD-10-CM

## 2025-05-05 RX ORDER — ATORVASTATIN CALCIUM 10 MG/1
10 TABLET, FILM COATED ORAL DAILY
Qty: 90 TABLET | Refills: 1 | Status: SHIPPED | OUTPATIENT
Start: 2025-05-05

## 2025-05-05 RX ORDER — SERTRALINE HYDROCHLORIDE 100 MG/1
100 TABLET, FILM COATED ORAL DAILY
Qty: 90 TABLET | Refills: 1 | Status: SHIPPED | OUTPATIENT
Start: 2025-05-05

## 2025-05-05 RX ORDER — POTASSIUM CHLORIDE 750 MG/1
10 TABLET, EXTENDED RELEASE ORAL DAILY
Qty: 60 TABLET | Refills: 3 | Status: SHIPPED | OUTPATIENT
Start: 2025-05-05

## 2025-05-05 NOTE — PROGRESS NOTES
Cardiology  Follow Up   Office Visit Note -     Karson You   70 y.o.   male   MRN: [id number]  [facility]  [address]  [address]  [place] [address] [phone]    PCP: NACHO Kenney  Cardiologist : Dr Reza            Summary of Plan:  Continue to hydrate, compression hose as tolerated  Nonfasting BMP: Will check his potassium as he is prone to hypokalemia  CBC: Will check a blood count, given chronic anticoagulation aspirin therapy and chronic anemia  Will check a direct LDL he is overdue for a lipid assessment, given statin therapy  Follow up will be scheduled with Dr Reza 6 months  Colon Ca screening: 10/05/2023, up-to-date          Impression/plan  chronic HFpEF.  Stable.  Home weight today 173 pounds.  Office weight is with clothing and shoes  Wt Readings from Last 3 Encounters:   05/06/25 82.1 kg (181 lb)   04/23/25 85.3 kg (188 lb)   03/12/25 85.3 kg (188 lb)   --beta-blocker: Deferring, given orthostatic hypotension  --Diuretic: Lasix 20 mg daily   --2 g sodium diet, 1800 cc fluid restriction. Daily weights.   PAF  [id number] 5:   Maintained on Eliquis 5 mg BID without bleeding consequences  Maintaining normal sinus rhythm  Given chronic anemia we will check a CBC  Secondary adrenal insufficiency following with endocrinology  Maintained on hydrocortisone, per endocrinology  Advised to double dose for sickness/stress.   Orthostatic hypotension-  Maintained on florinef 0.2 mg daily.  Off midodrine as a routine but does have midodrine 5 mg as needed for systolic blood pressure less than 100  Uses abdominal binder and compression stockings.  He uses compression stockings intermittently.  hydrate, as previously discussed  /78 today  Periodic home blood pressure monitoring, given he is at risk for hypotension given treatment for orthostatic hypotension.  His daughter is checking him 3 times daily  CKD 2  baseline creatinine 0.9 followed  by nephrology  Stable  Hyperlipidemia.   On atorvastatin 10 mg/d.    8/2022 LDL 64 non-HDL 81.  Due for reassessment.  Will check a direct LDL today  Heart healthy diet: Education provided  Chronic anemia on chronic anticoagulation as well as a baby aspirin..  Will check a CBC today  Type 1 diabetes mellitus with polyneuropathy, on insulin  1/19/2025 Hemoglobin A1c 8.5, per endocrinology  History CVA, maintained on aspirin and statin  Cardiac testing  SPECT 8/20/2022 [facility] No evidence of ischemia. Likely mild diaphragmatic attenuation artifact at proximal inferior wall.  Normal left ventricular wall motion and ejection fraction.   TTE 12/5/2023.  EF 60%.  Wall motion normal.  Diastolic function normal.  RV normal size and function.                HPI:   Karson You is a 70 y.o.year old male with secondary adrenal insufficiency, orthostatic hypotension, type 1 diabetes mellitus, anemia, paroxysmal atrial fibrillation,who has a  history of CVA.  He follows with Dr. Reza.  He was last seen by telemedicine 2/23/2023.  His main issue has been adrenal insufficiency and orthostatic hypotension.  He has been maintained on high-dose midodrine 15 mg 3 times daily, Florinef 0.2 mg daily, hydrocortisone 20 mg in the morning 50 mg in the p.m. and salt tablets 1 g 3 times daily.  He is also followed carefully with endocrinology and neurology.  There has been on some discussion whether he would benefit from Northera.  He is taking care of very closely by his daughter    3/12/2024 ED admission   CC: Confusion  Workup disclosed a potassium level of 3.1 which was repleted    6/13/2024 Acute visit  June 6 MyChart messaging from patient's daughter her father had edema of the legs hands and weight was increased by 10 pounds.  Prescribed Lasix 20 mg x 3 days and then advised to stop    She notified us June 11 that there was no change in his symptoms and the blood pressure was elevated.    Advised by his cardiologist to schedule an office  visit  ROS: He is short of breath.  He has lower extremity edema.  Has increasing cough.  EKG normal sinus rhythm 77 bpm  /85  Weight 196 pounds.  Home weight 184 pounds this week.  Per the daughter 20 pounds over his dry weight  She has been holding the salt tablets.  She has also been holding the midodrine    Today he is volume overloaded.  He is no acute distress however.  Will obtain nonfasting baseline labs today  Will make Lasix 20 mg daily.  Will make midodrine as needed.  BP parameters as above.  Will obtain follow-up labs around the 25th.  I recommend a salt restricted diet for now      2/7/25   WERNER Cintron NP  Per her note:  69 y.o.  male  with PMH as below is here for  routine visit  Patient of Dr. Reza  Admitted 1/7-1/11/25  per note review, had a fall, but Daughter states he did not fall at home. His sugar was really josue and he was spaced out and the family sat him in a chair.. likely secondary to orthostatic hypotension.  Orthostatics were positive.  He was placed on scheduled midodrine in addition to his Florinef.  He is also on Cortef for history of adrenal insufficiency and is known to endocrinology outpatient.  Orthostatic hypotension likely related to autonomic dysfunction from longstanding history of diabetes mellitus.      Today,  he is now on midodrine 5 mg TID PRN. He has not needed to take the midodrine since rehab because his BP kept running high  He wears the abdominal binder. He develop a wound from dominguez stocking and has not worn them since rehab.     Will RTO in 3 months or sooner if necessary     Weight 183 pound      5/6/25  Close cardiology follow-up    Interval history:  Last office visit with Dr. Reza 2/7/23  He had 3 admissions in 2025, - 2 in January, 1 in February  In the first admission he had a fall secondary to orthostasis.  Orthostatics were positive.  Midodrine was added to Florinef.  His insulin regimen was adjusted.  Discharged to [facility] rehab    January 21 he was  "admitted with hyerglycemia from rehab ( BS > 600).  Discharged on Lasix 20 mg daily, to home    Admitted 2/14-2/18/25 after a mechanical fall where he sustained a closed fracture of left hip, pelvic fracture-comminuted angulated intertrochanteric femoral fracture\".   No ICH despite Eliquis.  [place] not to have a head strike  intramedullary femoral nail on 2/15.  Patient did have a drop in his hemoglobin to 7.0   Discharged to short-term rehab at [place] x1 month.  DCd to home around 3/18/25    Endo appt: 4/202/5  decreased hydrocortisone.  Insulin adjusted      He is coming by his daughter and grandson.  His daughter provides the history.  She checks his blood pressure 3 times daily.  He has not needed any midodrine.  His systolic blood pressure runs in the 140s.  He is maintained on Florinef 0.2 mg daily.  BP in the office today 144/78    He only walks with assistance.  He denies any symptoms of orthostasis at this time.    Current weight 181 pound, in the office down previously from 188.  However his daughter reports he has 173 pounds at home this morning    On exam he is euvolemic  Today I will order a nonfasting BMP, direct LDL and CBC to monitor his chronic therapies.  He will return to see his cardiologist in 6 months.         Assessment  Diagnoses and all orders for this visit:    Chronic diastolic heart failure (HCC)    Paroxysmal A-fib (HCC)    Orthostatic hypotension  -     midodrine (PROAMATINE) 5 mg tablet; Take 1 tablet (5 mg total) by mouth 3 (three) times a day as needed (SBP < 100)  -     Basic metabolic panel; Future    Mixed hyperlipidemia  -     Lipid panel; Future  -     LDL cholesterol, direct; Future    Hypokalemia  -     Basic metabolic panel; Future    Adrenal insufficiency (HCC)    Chronic anticoagulation  -     CBC and Platelet; Future    Type 1 diabetes mellitus with hyperglycemia (HCC)    Stage 2 chronic kidney disease          Past Medical History:   Diagnosis Date    A-fib (HCC)     " Adrenal insufficiency (HCC)     Atrial fibrillation (HCC)     Diabetes mellitus (HCC)     History of stroke 10/19/2022    Obesity, morbid (HCC) 11/14/2022    Shock (HCC) 12/14/2023    Stroke (HCC)     Type 2 MI (myocardial infarction) (HCC) 11/03/2022       Review of Systems   Constitutional: Positive for weight gain. Negative for chills.   Cardiovascular:  Positive for dyspnea on exertion and leg swelling. Negative for chest pain, claudication, cyanosis, irregular heartbeat, near-syncope, orthopnea, palpitations, paroxysmal nocturnal dyspnea and syncope.   Respiratory:  Negative for cough and shortness of breath.    Gastrointestinal:  Negative for heartburn and nausea.   Neurological:  Negative for dizziness, focal weakness, headaches, light-headedness and weakness.   All other systems reviewed and are negative.      Allergies   Allergen Reactions    Imipramine Other (See Comments)    Lisinopril Other (See Comments)    Paroxetine Other (See Comments)    Penicillins Hives    Rosiglitazone Other (See Comments)    Troglitazone Other (See Comments)     .    Current Outpatient Medications:     acetaminophen (TYLENOL) 325 mg tablet, Take 3 tablets (975 mg total) by mouth every 6 (six) hours as needed for mild pain, Disp: 270 tablet, Rfl: 0    albuterol (PROVENTIL HFA,VENTOLIN HFA) 90 mcg/act inhaler, Inhale 2 puffs every 4 (four) hours as needed for wheezing, Disp: , Rfl:     apixaban (Eliquis) 5 mg, Take 1 tablet (5 mg total) by mouth 2 (two) times a day, Disp: 60 tablet, Rfl: 3    aspirin 81 mg chewable tablet, Chew 81 mg daily, Disp: , Rfl:     atorvastatin (LIPITOR) 10 mg tablet, Take 1 tablet (10 mg total) by mouth daily, Disp: 90 tablet, Rfl: 1    Continuous Blood Gluc  (Dexcom G7 ) CYN, Use 1 each continuous, Disp: 1 each, Rfl: 0    Continuous Glucose Sensor (Dexcom G7 Sensor), Use 1 Device every 10 days, Disp: 9 each, Rfl: 1    Diclofenac Sodium (VOLTAREN) 1 %, Apply 2 g topically 4 (four) times  a day, Disp: 100 g, Rfl: 3    fludrocortisone (FLORINEF) 0.1 mg tablet, TAKE 2 TABLETS BY MOUTH EVERY DAY, Disp: 180 tablet, Rfl: 1    furosemide (LASIX) 20 mg tablet, Take 1 tablet (20 mg total) by mouth daily, Disp: 30 tablet, Rfl: 2    Glucagon, rDNA, (Glucagon Emergency) 1 MG KIT, Inject 1 mg as directed if needed (for glucose <50mg/dL), Disp: 1 kit, Rfl: 1    hydrocortisone (CORTEF) 5 mg tablet, USE 3 TABLETS IN MORNING AND 2 TABLETS DAILY AFTERNOON, Disp: 150 tablet, Rfl: 6    insulin glargine (Toujeo Max SoloStar) 300 units/mL CONCENTRATED U-300 injection pen (2-unit dial), Inject 14 Units under the skin daily at bedtime, Disp: 3 mL, Rfl: 2    insulin lispro (HumALOG/ADMELOG) 100 units/mL injection, Use 4u TIDAC plus 1u/50mg above 150mg/dL; max 25u /day, Disp: 18 mL, Rfl: 1    Insulin Pen Needle (BD Pen Needle Josie 2nd Gen) 32G X 4 MM MISC, For use with insulin pen 4 times daily. Pharmacy may dispense brand covered by insurance., Disp: 200 each, Rfl: 3    Insulin Pen Needle (BD Pen Needle Josie 2nd Gen) 32G X 4 MM MISC, FOR USE WITH INSULIN PEN. PHARMACY MAY DISPENSE BRAND COVERED BY INSURANCE., Disp: 200 each, Rfl: 5    lidocaine (LIDODERM) 5 %, Apply 1 patch topically over 12 hours daily as needed (pain) Remove & Discard patch within 12 hours as needed, Disp: 9 patch, Rfl: 1    Magnesium Oxide 400 MG CAPS, Take 1 tablet (400 mg total) by mouth 2 (two) times a day, Disp: 180 tablet, Rfl: 3    methocarbamol (ROBAXIN) 500 mg tablet, Take 1 tablet (500 mg total) by mouth 3 (three) times a day as needed for muscle spasms, Disp: 30 tablet, Rfl: 1    midodrine (PROAMATINE) 5 mg tablet, Take 1 tablet (5 mg total) by mouth 3 (three) times a day as needed (SBP < 100), Disp: , Rfl:     potassium chloride (Klor-Con) 10 mEq tablet, Take 1 tablet (10 mEq total) by mouth in the morning, Disp: 60 tablet, Rfl: 3    sertraline (ZOLOFT) 100 mg tablet, Take 1 tablet (100 mg total) by mouth daily, Disp: 90 tablet, Rfl: 1     oxyCODONE (ROXICODONE) 5 immediate release tablet, Take 0.5 tablets (2.5 mg total) by mouth every 4 (four) hours as needed for severe pain for up to 10 doses Max Daily Amount: 15 mg (Patient not taking: Reported on 5/6/2025), Disp: 0.5 tablet, Rfl: 0    senna-docusate sodium (SENOKOT S) 8.6-50 mg per tablet, Take 1 tablet by mouth daily at bedtime (Patient not taking: Reported on 5/6/2025), Disp: 30 tablet, Rfl: 0    Social History     Socioeconomic History    Marital status: Single     Spouse name: Not on file    Number of children: Not on file    Years of education: Not on file    Highest education level: Not on file   Occupational History    Not on file   Tobacco Use    Smoking status: Former     Current packs/day: 0.25     Average packs/day: 0.3 packs/day for 30.0 years (7.5 ttl pk-yrs)     Types: Cigarettes    Smokeless tobacco: Never   Vaping Use    Vaping status: Never Used   Substance and Sexual Activity    Alcohol use: Not Currently     Alcohol/week: 5.0 standard drinks of alcohol     Types: 5 Cans of beer per week     Comment: Quit in august 2022    Drug use: Never    Sexual activity: Not Currently   Other Topics Concern    Not on file   Social History Narrative    ** Merged History Encounter **          Social Drivers of Health     Financial Resource Strain: Not on file   Food Insecurity: No Food Insecurity (2/14/2025)    Nursing - Inadequate Food Risk Classification     Worried About Running Out of Food in the Last Year: Never true     Ran Out of Food in the Last Year: Never true     Ran Out of Food in the Last Year: Never true   Transportation Needs: Unmet Transportation Needs (2/14/2025)    Nursing - Transportation Risk Classification     Lack of Transportation: Not on file     Lack of Transportation: Yes   Physical Activity: Not on file   Stress: Not on file   Social Connections: Not on file   Intimate Partner Violence: Unknown (2/14/2025)    Nursing IPS     Feels Physically and Emotionally Safe: Not  "on file     Physically Hurt by Someone: Not on file     Humiliated or Emotionally Abused by Someone: Not on file     Physically Hurt by Someone: No     Hurt or Threatened by Someone: No   Housing Stability: Unknown (2025)    Nursing: Inadequate Housing Risk Classification     Has Housing: Not on file     Worried About Losing Housing: Not on file     Unable to Get Utilities: Not on file     Unable to Pay for Housing in the Last Year: No     Has Housin       Family History   Problem Relation Age of Onset    Heart disease Mother     Cancer Father     Multiple sclerosis Sister        Physical Exam  Vitals and nursing note reviewed.   Constitutional:       General: He is not in acute distress.  HENT:      Head: Normocephalic and atraumatic.   Eyes:      Extraocular Movements: Extraocular movements intact.      Conjunctiva/sclera: Conjunctivae normal.   Cardiovascular:      Rate and Rhythm: Normal rate and regular rhythm.      Pulses: Intact distal pulses.      Heart sounds: Normal heart sounds.   Pulmonary:      Effort: Pulmonary effort is normal.      Breath sounds: Decreased breath sounds present.   Abdominal:      General: Bowel sounds are normal.      Palpations: Abdomen is soft.   Musculoskeletal:         General: Normal range of motion.      Cervical back: Normal range of motion and neck supple.      Right lower leg: Edema present.      Left lower leg: Edema present.   Skin:     General: Skin is warm and dry.   Neurological:      Mental Status: He is alert and oriented to person, place, and time.   Psychiatric:         Mood and Affect: Mood normal.         Vitals: Blood pressure 144/78, pulse 66, height 5' 4\" (1.626 m), weight 82.1 kg (181 lb), SpO2 100%.   Wt Readings from Last 3 Encounters:   25 82.1 kg (181 lb)   25 85.3 kg (188 lb)   25 85.3 kg (188 lb)         Labs & Results:  Lab Results   Component Value Date    WBC 9.24 2025    HGB 8.1 (L) 2025    HCT 25.2 (L) " "02/17/2025    MCV 96 02/17/2025     02/17/2025     BNP   Date Value Ref Range Status   02/14/2025 360 (H) 0 - 100 pg/mL Final   06/13/2024 417 (H) 0 - 100 pg/mL Final   12/19/2023 666 (H) 0 - 100 pg/mL Final     No components found for: \"CHEM\"    No results found for this or any previous visit.    No results found for this or any previous visit.        This note was completed in part utilizing Fusion [employer] direct voice recognition software.   Grammatical errors, random word insertion, spelling mistakes, and incomplete sentences may be an occasional consequence of the system secondary to software limitations, ambient noise and hardware issues. At the time of dictation, efforts were made to edit, clarify and /or correct errors.  Please read the chart carefully and recognize, using context, where substitutions have occurred.  If you have any questions or concerns about the context, text or information contained within the body of this dictation, please contact myself, the provider, for further clarification      "

## 2025-05-05 NOTE — TELEPHONE ENCOUNTER
Medication: atorvastatin (LIPITOR)     Dose/Frequency: 10 mg    Quantity:     Pharmacy: [facility]/pharmacy #[id number]  [place], [place] - [address] [address]  Phone: [phone] Fax: [phone]    Office:   [x] PCP/Provider -   [] Speciality/Provider -     Does the patient have enough for 3 days?   [x] Yes   [] No - Send as HP to POD       Medication: potassium chloride (Klor-Con)     Dose/Frequency: 10 mEq    Quantity: 30    Pharmacy: [facility]/pharmacy #[id number]  [place], [place] - [address] [address]  Phone: [phone] Fax: [phone]      Office:   [x] PCP/Provider -   [] Speciality/Provider -     Does the patient have enough for 3 days?   [] Yes   [x] No - Send as HP to POD    __________________________________    Medication: sertraline (ZOLOFT)     Dose/Frequency: 100 mg    Quantity:     Pharmacy: [facility]/pharmacy #[id number]  [place], [address]     Office:   [x] PCP/Provider -   [] Speciality/Provider -     Does the patient have enough for 3 days?   [x] Yes   [] No - Send as HP to POD

## 2025-05-06 ENCOUNTER — OFFICE VISIT (OUTPATIENT)
Dept: CARDIOLOGY CLINIC | Facility: CLINIC | Age: 70
End: 2025-05-06
Payer: COMMERCIAL

## 2025-05-06 VITALS
HEIGHT: 64 IN | SYSTOLIC BLOOD PRESSURE: 144 MMHG | WEIGHT: 181 LBS | HEART RATE: 66 BPM | DIASTOLIC BLOOD PRESSURE: 78 MMHG | BODY MASS INDEX: 30.9 KG/M2 | OXYGEN SATURATION: 100 %

## 2025-05-06 DIAGNOSIS — I48.0 PAROXYSMAL A-FIB (HCC): ICD-10-CM

## 2025-05-06 DIAGNOSIS — I95.1 ORTHOSTATIC HYPOTENSION: ICD-10-CM

## 2025-05-06 DIAGNOSIS — Z79.01 CHRONIC ANTICOAGULATION: ICD-10-CM

## 2025-05-06 DIAGNOSIS — E78.2 MIXED HYPERLIPIDEMIA: ICD-10-CM

## 2025-05-06 DIAGNOSIS — E10.65 TYPE 1 DIABETES MELLITUS WITH HYPERGLYCEMIA (HCC): ICD-10-CM

## 2025-05-06 DIAGNOSIS — E87.6 HYPOKALEMIA: ICD-10-CM

## 2025-05-06 DIAGNOSIS — I50.32 CHRONIC DIASTOLIC HEART FAILURE (HCC): Primary | ICD-10-CM

## 2025-05-06 DIAGNOSIS — N18.2 STAGE 2 CHRONIC KIDNEY DISEASE: ICD-10-CM

## 2025-05-06 DIAGNOSIS — E27.40 ADRENAL INSUFFICIENCY (HCC): ICD-10-CM

## 2025-05-06 PROBLEM — I50.33 ACUTE ON CHRONIC HEART FAILURE WITH PRESERVED EJECTION FRACTION (HFPEF) (HCC): Status: RESOLVED | Noted: 2025-02-07 | Resolved: 2025-05-06

## 2025-05-06 PROCEDURE — 99214 OFFICE O/P EST MOD 30 MIN: CPT | Performed by: NURSE PRACTITIONER

## 2025-05-06 RX ORDER — MIDODRINE HYDROCHLORIDE 5 MG/1
5 TABLET ORAL 3 TIMES DAILY PRN
Start: 2025-05-06

## 2025-05-06 NOTE — LETTER
May 6, 2025     NACHO Kenney  [address]  [address]  [place] [address]    Patient: Karson You   YOB: 1955   Date of Visit: 5/6/2025       Dear NACHO Santana:    Thank you for referring Karson You to me for evaluation. Below are my notes for this consultation.    If you have questions, please do not hesitate to call me. I look forward to following your patient along with you.         Sincerely,        NACHO Gonzalez        CC: No Recipients    NACHO Gonzalez  5/6/2025 10:40 AM  Sign when Signing Visit  Cardiology  Follow Up   Office Visit Note -     Karson You   70 y.o.   male   MRN: [id number]  [facility] [place]  [address]  [address]  [facility] [address]  [phone]    PCP: NACHO Kenney  Cardiologist : Dr Reza            Summary of Plan:  Continue to hydrate, compression hose as tolerated  Nonfasting BMP: Will check his potassium as he is prone to hypokalemia  CBC: Will check a blood count, given chronic anticoagulation aspirin therapy and chronic anemia  Will check a direct LDL he is overdue for a lipid assessment, given statin therapy  Follow up will be scheduled with Dr Reza 6 months  Colon Ca screening: 10/05/2023, up-to-date          Impression/plan  chronic HFpEF.  Stable.  Home weight today 173 pounds.  Office weight is with clothing and shoes  Wt Readings from Last 3 Encounters:   05/06/25 82.1 kg (181 lb)   04/23/25 85.3 kg (188 lb)   03/12/25 85.3 kg (188 lb)   --beta-blocker: Deferring, given orthostatic hypotension  --Diuretic: Lasix 20 mg daily   --2 g sodium diet, 1800 cc fluid restriction. Daily weights.   PAF  [id number] 5:   Maintained on Eliquis 5 mg BID without bleeding consequences  Maintaining normal sinus rhythm  Given chronic anemia we will check a CBC  Secondary adrenal insufficiency following with endocrinology  Maintained on hydrocortisone, per  endocrinology  Advised to double dose for sickness/stress.   Orthostatic hypotension-  Maintained on florinef 0.2 mg daily.  Off midodrine as a routine but does have midodrine 5 mg as needed for systolic blood pressure less than 100  Uses abdominal binder and compression stockings.  He uses compression stockings intermittently.  hydrate, as previously discussed  /78 today  Periodic home blood pressure monitoring, given he is at risk for hypotension given treatment for orthostatic hypotension.  His daughter is checking him 3 times daily  CKD 2  baseline creatinine 0.9 followed by nephrology  Stable  Hyperlipidemia.   On atorvastatin 10 mg/d.    8/2022 LDL 64 non-HDL 81.  Due for reassessment.  Will check a direct LDL today  Heart healthy diet: Education provided  Chronic anemia on chronic anticoagulation as well as a baby aspirin..  Will check a CBC today  Type 1 diabetes mellitus with polyneuropathy, on insulin  1/19/2025 Hemoglobin A1c 8.5, per endocrinology  History CVA, maintained on aspirin and statin  Cardiac testing  SPECT 8/20/2022 [facility] No evidence of ischemia. Likely mild diaphragmatic attenuation artifact at proximal inferior wall.  Normal left ventricular wall motion and ejection fraction.   TTE 12/5/2023.  EF 60%.  Wall motion normal.  Diastolic function normal.  RV normal size and function.                HPI:   Karson You is a 70 y.o.year old male with secondary adrenal insufficiency, orthostatic hypotension, type 1 diabetes mellitus, anemia, paroxysmal atrial fibrillation,who has a  history of CVA.  He follows with Dr. Reza.  He was last seen by telemedicine 2/23/2023.  His main issue has been adrenal insufficiency and orthostatic hypotension.  He has been maintained on high-dose midodrine 15 mg 3 times daily, Florinef 0.2 mg daily, hydrocortisone 20 mg in the morning 50 mg in the p.m. and salt tablets 1 g 3 times daily.  He is also followed carefully with endocrinology and neurology.  There has  been on some discussion whether he would benefit from Northera.  He is taking care of very closely by his daughter    3/12/2024 ED admission   CC: Confusion  Workup disclosed a potassium level of 3.1 which was repleted    6/13/2024 Acute visit  June 6 MyChart messaging from patient's daughter her father had edema of the legs hands and weight was increased by 10 pounds.  Prescribed Lasix 20 mg x 3 days and then advised to stop    She notified us June 11 that there was no change in his symptoms and the blood pressure was elevated.    Advised by his cardiologist to schedule an office visit  ROS: He is short of breath.  He has lower extremity edema.  Has increasing cough.  EKG normal sinus rhythm 77 bpm  /85  Weight 196 pounds.  Home weight 184 pounds this week.  Per the daughter 20 pounds over his dry weight  She has been holding the salt tablets.  She has also been holding the midodrine    Today he is volume overloaded.  He is no acute distress however.  Will obtain nonfasting baseline labs today  Will make Lasix 20 mg daily.  Will make midodrine as needed.  BP parameters as above.  Will obtain follow-up labs around the 25th.  I recommend a salt restricted diet for now      2/7/25   WERNER Cintron NP  Per her note:  69 y.o.  male  with PMH as below is here for  routine visit  Patient of Dr. Reza  Admitted 1/7-1/11/25  per note review, had a fall, but Daughter states he did not fall at home. His sugar was really josue and he was spaced out and the family sat him in a chair.. likely secondary to orthostatic hypotension.  Orthostatics were positive.  He was placed on scheduled midodrine in addition to his Florinef.  He is also on Cortef for history of adrenal insufficiency and is known to endocrinology outpatient.  Orthostatic hypotension likely related to autonomic dysfunction from longstanding history of diabetes mellitus.      Today,  he is now on midodrine 5 mg TID PRN. He has not needed to take the midodrine  "since rehab because his BP kept running high  He wears the abdominal binder. He develop a wound from dominguez stocking and has not worn them since rehab.     Will RTO in 3 months or sooner if necessary     Weight 183 pound      5/6/25  Close cardiology follow-up    Interval history:  Last office visit with Dr. Reza 2/7/23  He had 3 admissions in 2025, - 2 in January, 1 in February  In the first admission he had a fall secondary to orthostasis.  Orthostatics were positive.  Midodrine was added to Florinef.  His insulin regimen was adjusted.  Discharged to [facility] rehab    January 21 he was admitted with hyerglycemia from rehab ( BS > 600).  Discharged on Lasix 20 mg daily, to home    Admitted 2/14-2/18/25 after a mechanical fall where he sustained a closed fracture of left hip, pelvic fracture-comminuted angulated intertrochanteric femoral fracture\".   No ICH despite Eliquis.  Premont not to have a head strike  intramedullary femoral nail on 2/15.  Patient did have a drop in his hemoglobin to 7.0   Discharged to short-term rehab at [place] x1 month.  DCd to home around 3/18/25    Endo appt: 4/202/5  decreased hydrocortisone.  Insulin adjusted      He is coming by his daughter and grandson.  His daughter provides the history.  She checks his blood pressure 3 times daily.  He has not needed any midodrine.  His systolic blood pressure runs in the 140s.  He is maintained on Florinef 0.2 mg daily.  BP in the office today 144/78    He only walks with assistance.  He denies any symptoms of orthostasis at this time.    Current weight 181 pound, in the office down previously from 188.  However his daughter reports he has 173 pounds at home this morning    On exam he is euvolemic  Today I will order a nonfasting BMP, direct LDL and CBC to monitor his chronic therapies.  He will return to see his cardiologist in 6 months.         Assessment  Diagnoses and all orders for this visit:    Chronic diastolic heart failure (HCC)    Paroxysmal " A-fib ([facility])    Orthostatic hypotension  -     midodrine (PROAMATINE) 5 mg tablet; Take 1 tablet (5 mg total) by mouth 3 (three) times a day as needed (SBP < 100)  -     Basic metabolic panel; Future    Mixed hyperlipidemia  -     Lipid panel; Future  -     LDL cholesterol, direct; Future    Hypokalemia  -     Basic metabolic panel; Future    Adrenal insufficiency (HCC)    Chronic anticoagulation  -     CBC and Platelet; Future    Type 1 diabetes mellitus with hyperglycemia (HCC)    Stage 2 chronic kidney disease          Past Medical History:   Diagnosis Date   • A-fib (HCC)    • Adrenal insufficiency (HCC)    • Atrial fibrillation (HCC)    • Diabetes mellitus (HCC)    • History of stroke 10/19/2022   • Obesity, morbid ([facility]) 11/14/2022   • Shock ([facility]) 12/14/2023   • Stroke ([facility])    • Type 2 MI (myocardial infarction) ([facility]) 11/03/2022       Review of Systems   Constitutional: Positive for weight gain. Negative for chills.   Cardiovascular:  Positive for dyspnea on exertion and leg swelling. Negative for chest pain, claudication, cyanosis, irregular heartbeat, near-syncope, orthopnea, palpitations, paroxysmal nocturnal dyspnea and syncope.   Respiratory:  Negative for cough and shortness of breath.    Gastrointestinal:  Negative for heartburn and nausea.   Neurological:  Negative for dizziness, focal weakness, headaches, light-headedness and weakness.   All other systems reviewed and are negative.      Allergies   Allergen Reactions   • Imipramine Other (See Comments)   • Lisinopril Other (See Comments)   • Paroxetine Other (See Comments)   • Penicillins Hives   • Rosiglitazone Other (See Comments)   • Troglitazone Other (See Comments)     .    Current Outpatient Medications:   •  acetaminophen (TYLENOL) 325 mg tablet, Take 3 tablets (975 mg total) by mouth every 6 (six) hours as needed for mild pain, Disp: 270 tablet, Rfl: 0  •  albuterol (PROVENTIL HFA,VENTOLIN HFA) 90 mcg/act inhaler, Inhale 2 puffs every 4 (four)  hours as needed for wheezing, Disp: , Rfl:   •  apixaban (Eliquis) 5 mg, Take 1 tablet (5 mg total) by mouth 2 (two) times a day, Disp: 60 tablet, Rfl: 3  •  aspirin 81 mg chewable tablet, Chew 81 mg daily, Disp: , Rfl:   •  atorvastatin (LIPITOR) 10 mg tablet, Take 1 tablet (10 mg total) by mouth daily, Disp: 90 tablet, Rfl: 1  •  Continuous Blood Gluc  (Dexcom G7 ) CYN, Use 1 each continuous, Disp: 1 each, Rfl: 0  •  Continuous Glucose Sensor (Dexcom G7 Sensor), Use 1 Device every 10 days, Disp: 9 each, Rfl: 1  •  Diclofenac Sodium (VOLTAREN) 1 %, Apply 2 g topically 4 (four) times a day, Disp: 100 g, Rfl: 3  •  fludrocortisone (FLORINEF) 0.1 mg tablet, TAKE 2 TABLETS BY MOUTH EVERY DAY, Disp: 180 tablet, Rfl: 1  •  furosemide (LASIX) 20 mg tablet, Take 1 tablet (20 mg total) by mouth daily, Disp: 30 tablet, Rfl: 2  •  Glucagon, rDNA, (Glucagon Emergency) 1 MG KIT, Inject 1 mg as directed if needed (for glucose <50mg/dL), Disp: 1 kit, Rfl: 1  •  hydrocortisone (CORTEF) 5 mg tablet, USE 3 TABLETS IN MORNING AND 2 TABLETS DAILY AFTERNOON, Disp: 150 tablet, Rfl: 6  •  insulin glargine (Toujeo Max SoloStar) 300 units/mL CONCENTRATED U-300 injection pen (2-unit dial), Inject 14 Units under the skin daily at bedtime, Disp: 3 mL, Rfl: 2  •  insulin lispro (HumALOG/ADMELOG) 100 units/mL injection, Use 4u TIDAC plus 1u/50mg above 150mg/dL; max 25u /day, Disp: 18 mL, Rfl: 1  •  Insulin Pen Needle (BD Pen Needle Josie 2nd Gen) 32G X 4 MM MISC, For use with insulin pen 4 times daily. Pharmacy may dispense brand covered by insurance., Disp: 200 each, Rfl: 3  •  Insulin Pen Needle (BD Pen Needle Josie 2nd Gen) 32G X 4 MM MISC, FOR USE WITH INSULIN PEN. PHARMACY MAY DISPENSE BRAND COVERED BY INSURANCE., Disp: 200 each, Rfl: 5  •  lidocaine (LIDODERM) 5 %, Apply 1 patch topically over 12 hours daily as needed (pain) Remove & Discard patch within 12 hours as needed, Disp: 9 patch, Rfl: 1  •  Magnesium Oxide 400 MG  CAPS, Take 1 tablet (400 mg total) by mouth 2 (two) times a day, Disp: 180 tablet, Rfl: 3  •  methocarbamol (ROBAXIN) 500 mg tablet, Take 1 tablet (500 mg total) by mouth 3 (three) times a day as needed for muscle spasms, Disp: 30 tablet, Rfl: 1  •  midodrine (PROAMATINE) 5 mg tablet, Take 1 tablet (5 mg total) by mouth 3 (three) times a day as needed (SBP < 100), Disp: , Rfl:   •  potassium chloride (Klor-Con) 10 mEq tablet, Take 1 tablet (10 mEq total) by mouth in the morning, Disp: 60 tablet, Rfl: 3  •  sertraline (ZOLOFT) 100 mg tablet, Take 1 tablet (100 mg total) by mouth daily, Disp: 90 tablet, Rfl: 1  •  oxyCODONE (ROXICODONE) 5 immediate release tablet, Take 0.5 tablets (2.5 mg total) by mouth every 4 (four) hours as needed for severe pain for up to 10 doses Max Daily Amount: 15 mg (Patient not taking: Reported on 5/6/2025), Disp: 0.5 tablet, Rfl: 0  •  senna-docusate sodium (SENOKOT S) 8.6-50 mg per tablet, Take 1 tablet by mouth daily at bedtime (Patient not taking: Reported on 5/6/2025), Disp: 30 tablet, Rfl: 0    Social History     Socioeconomic History   • Marital status: Single     Spouse name: Not on file   • Number of children: Not on file   • Years of education: Not on file   • Highest education level: Not on file   Occupational History   • Not on file   Tobacco Use   • Smoking status: Former     Current packs/day: 0.25     Average packs/day: 0.3 packs/day for 30.0 years (7.5 ttl pk-yrs)     Types: Cigarettes   • Smokeless tobacco: Never   Vaping Use   • Vaping status: Never Used   Substance and Sexual Activity   • Alcohol use: Not Currently     Alcohol/week: 5.0 standard drinks of alcohol     Types: 5 Cans of beer per week     Comment: Quit in august 2022   • Drug use: Never   • Sexual activity: Not Currently   Other Topics Concern   • Not on file   Social History Narrative    ** Merged History Encounter **          Social Drivers of Health     Financial Resource Strain: Not on file   Food  Insecurity: No Food Insecurity (2025)    Nursing - Inadequate Food Risk Classification    • Worried About Running Out of Food in the Last Year: Never true    • Ran Out of Food in the Last Year: Never true    • Ran Out of Food in the Last Year: Never true   Transportation Needs: Unmet Transportation Needs (2025)    Nursing - Transportation Risk Classification    • Lack of Transportation: Not on file    • Lack of Transportation: Yes   Physical Activity: Not on file   Stress: Not on file   Social Connections: Not on file   Intimate Partner Violence: Unknown (2025)    Nursing IPS    • Feels Physically and Emotionally Safe: Not on file    • Physically Hurt by Someone: Not on file    • Humiliated or Emotionally Abused by Someone: Not on file    • Physically Hurt by Someone: No    • Hurt or Threatened by Someone: No   Housing Stability: Unknown (2025)    Nursing: Inadequate Housing Risk Classification    • Has Housing: Not on file    • Worried About Losing Housing: Not on file    • Unable to Get Utilities: Not on file    • Unable to Pay for Housing in the Last Year: No    • Has Housin       Family History   Problem Relation Age of Onset   • Heart disease Mother    • Cancer Father    • Multiple sclerosis Sister        Physical Exam  Vitals and nursing note reviewed.   Constitutional:       General: He is not in acute distress.  HENT:      Head: Normocephalic and atraumatic.   Eyes:      Extraocular Movements: Extraocular movements intact.      Conjunctiva/sclera: Conjunctivae normal.   Cardiovascular:      Rate and Rhythm: Normal rate and regular rhythm.      Pulses: Intact distal pulses.      Heart sounds: Normal heart sounds.   Pulmonary:      Effort: Pulmonary effort is normal.      Breath sounds: Decreased breath sounds present.   Abdominal:      General: Bowel sounds are normal.      Palpations: Abdomen is soft.   Musculoskeletal:         General: Normal range of motion.      Cervical back:  "Normal range of motion and neck supple.      Right lower leg: Edema present.      Left lower leg: Edema present.   Skin:     General: Skin is warm and dry.   Neurological:      Mental Status: He is alert and oriented to person, place, and time.   Psychiatric:         Mood and Affect: Mood normal.         Vitals: Blood pressure 144/78, pulse 66, height 5' 4\" (1.626 m), weight 82.1 kg (181 lb), SpO2 100%.   Wt Readings from Last 3 Encounters:   05/06/25 82.1 kg (181 lb)   04/23/25 85.3 kg (188 lb)   03/12/25 85.3 kg (188 lb)         Labs & Results:  Lab Results   Component Value Date    WBC 9.24 02/17/2025    HGB 8.1 (L) 02/17/2025    HCT 25.2 (L) 02/17/2025    MCV 96 02/17/2025     02/17/2025     BNP   Date Value Ref Range Status   02/14/2025 360 (H) 0 - 100 pg/mL Final   06/13/2024 417 (H) 0 - 100 pg/mL Final   12/19/2023 666 (H) 0 - 100 pg/mL Final     No components found for: \"CHEM\"    No results found for this or any previous visit.    No results found for this or any previous visit.        This note was completed in part utilizing [email or web address] direct voice recognition software.   Grammatical errors, random word insertion, spelling mistakes, and incomplete sentences may be an occasional consequence of the system secondary to software limitations, ambient noise and hardware issues. At the time of dictation, efforts were made to edit, clarify and /or correct errors.  Please read the chart carefully and recognize, using context, where substitutions have occurred.  If you have any questions or concerns about the context, text or information contained within the body of this dictation, please contact myself, the provider, for further clarification      "

## 2025-05-06 NOTE — LETTER
May 6, 2025     NACHO Kenney  [address]  [address]  [facility] [address]    Patient: Karson You   YOB: 1955   Date of Visit: 5/6/2025       Dear NACHO Santana:    Thank you for referring Karson You to me for evaluation. Below are my notes for this consultation.    If you have questions, please do not hesitate to call me. I look forward to following your patient along with you.         Sincerely,        NACHO Gonzalez        CC: No Recipients    NACHO Gonzalez  5/6/2025 10:02 AM  Incomplete  Cardiology  Follow Up   Office Visit Note -     Karson You   70 y.o.   male   MRN: [id number]  [facility] [place]  [address]  [address]  [facility] [address]  [phone]    PCP: NACHO Kenney  Cardiologist : Dr Reza            Summary of Plan:  Heart healthy diet education provided  Fasting lipid profile  Follow up will be scheduled with Dr Juaquin Hester Ca screening: 10/05/2023, up-to-date          Impression/plan  chronic HFpEF   Wt Readings from Last 3 Encounters:   04/23/25 85.3 kg (188 lb)   03/12/25 85.3 kg (188 lb)   02/14/25 85.4 kg (188 lb 4.4 oz)   --beta-blocker:    --Diuretic: Lasix 20 mg daily   --2 g sodium diet, 1800 cc fluid restriction. Daily weights.   PAF  [id number] 5:   Maintained on Eliquis 5 mg BID  Maintaining normal sinus rhythm  Secondary adrenal insufficiency following with endocrinology  Maintained on hydrocortisone   Advised to double dose for sickness/stress.   Orthostatic hypotension-  Maintained on florinef 0.2 mg daily.  Off midodrine***  Uses abdominal binder and compression stockings.   hydrate  BP ***  Periodic home blood pressure monitoring, given he is at risk for hypotension given treatment for orthostatic hypotension  CKD 2  baseline creatinine 0.9 followed by nephrology  Stable  Hyperlipidemia.   On atorvastatin 10 mg/d.    8/2022 LDL 64 non-HDL 81.  Due for  reassessment  Heart healthy diet: Education provided  Chronic anemia  Type 1 diabetes mellitus with polyneuropathy, on insulin  1/19/2025 Hemoglobin A1c 8.5, per endocrinology  History CVA, maintained on aspirin and statin  Cardiac testing  SPECT 8/20/2022 [facility] No evidence of ischemia. Likely mild diaphragmatic attenuation artifact at proximal inferior wall.  Normal left ventricular wall motion and ejection fraction.   TTE 12/5/2023.  EF 60%.  Wall motion normal.  Diastolic function normal.  RV normal size and function.                HPI:   Karson You is a 70 y.o.year old male with secondary adrenal insufficiency, orthostatic hypotension, type 1 diabetes mellitus, anemia, paroxysmal atrial fibrillation,who has a  history of CVA.  He follows with Dr. Reza.  He was last seen by telemedicine 2/23/2023.  His main issue has been adrenal insufficiency and orthostatic hypotension.  He has been maintained on high-dose midodrine 15 mg 3 times daily, Florinef 0.2 mg daily, hydrocortisone 20 mg in the morning 50 mg in the p.m. and salt tablets 1 g 3 times daily.  He is also followed carefully with endocrinology and neurology.  There has been on some discussion whether he would benefit from Northera.  He is taking care of very closely by his daughter    3/12/2024 ED admission   CC: Confusion  Workup disclosed a potassium level of 3.1 which was repleted    6/13/2024 Acute visit  June 6 MyChart messaging from patient's daughter her father had edema of the legs hands and weight was increased by 10 pounds.  Prescribed Lasix 20 mg x 3 days and then advised to stop    She notified us June 11 that there was no change in his symptoms and the blood pressure was elevated.    Advised by his cardiologist to schedule an office visit  ROS: He is short of breath.  He has lower extremity edema.  Has increasing cough.  EKG normal sinus rhythm 77 bpm  /85  Weight 196 pounds.  Home weight 184 pounds this week.  Per the daughter 20 pounds  over his dry weight  She has been holding the salt tablets.  She has also been holding the midodrine    Today he is volume overloaded.  He is no acute distress however.  Will obtain nonfasting baseline labs today  Will make Lasix 20 mg daily.  Will make midodrine as needed.  BP parameters as above.  Will obtain follow-up labs around the 25th.  I recommend a salt restricted diet for now      2/7/25   OV GE Cintron NP  Per her note:  69 y.o.  male  with PMH as below is here for  routine visit  Patient of Dr. Reza  Admitted 1/7-1/11/25  per note review, had a fall, but Daughter states he did not fall at home. His sugar was really josue and he was spaced out and the family sat him in a chair.. likely secondary to orthostatic hypotension.  Orthostatics were positive.  He was placed on scheduled midodrine in addition to his Florinef.  He is also on Cortef for history of adrenal insufficiency and is known to endocrinology outpatient.  Orthostatic hypotension likely related to autonomic dysfunction from longstanding history of diabetes mellitus.      Today,  he is now on midodrine 5 mg TID PRN. He has not needed to take the midodrine since rehab because his BP kept running high  He wears the abdominal binder. He develop a wound from dominguez stocking and has not worn them since rehab.     Will RTO in 3 months or sooner if necessary     Weight 183 pound      5/6/25  Close cardiology follow-up    Interval history:  Last office visit with Dr. Reza 2/7/23  He had 3 admissions in 2025, - 2 in January, 1 in February  In the first admission he had a fall secondary to orthostasis.  Orthostatics were positive.  Midodrine was added to Florinef.  His insulin regimen was adjusted.  Discharged to [facility] rehab  January 21 he was admitted with hyperglycemia from rehab.  Discharged on Lasix 20 mg daily  Admitted 2/14-2/18/25 after a mechanical fall where he sustained a closed fracture of left hip, pelvic fracture-comminuted angulated  "intertrochanteric femoral fracture\".   No ICH despite Eliquis.  [place] not to have a head strike  intramedullary femoral nail on 2/15.Patient did have a drop in his hemoglobin to 7.0   Discharged to short-term rehab at [place]  Endo: [id number] ***                       Assessment  There are no diagnoses linked to this encounter.      Past Medical History:   Diagnosis Date   • A-fib ([facility])    • Adrenal insufficiency ([facility])    • Atrial fibrillation ([facility])    • Diabetes mellitus ([facility])    • History of stroke 10/19/2022   • Obesity, morbid ([facility]) 11/14/2022   • Shock ([facility]) 12/14/2023   • Stroke ([facility])    • Type 2 MI (myocardial infarction) ([facility]) 11/03/2022       Review of Systems   Constitutional: Positive for weight gain. Negative for chills.   Cardiovascular:  Positive for dyspnea on exertion and leg swelling. Negative for chest pain, claudication, cyanosis, irregular heartbeat, near-syncope, orthopnea, palpitations, paroxysmal nocturnal dyspnea and syncope.   Respiratory:  Negative for cough and shortness of breath.    Gastrointestinal:  Negative for heartburn and nausea.   Neurological:  Negative for dizziness, focal weakness, headaches, light-headedness and weakness.   All other systems reviewed and are negative.      Allergies   Allergen Reactions   • Imipramine Other (See Comments)   • Lisinopril Other (See Comments)   • Paroxetine Other (See Comments)   • Penicillins Hives   • Rosiglitazone Other (See Comments)   • Troglitazone Other (See Comments)     .    Current Outpatient Medications:   •  acetaminophen (TYLENOL) 325 mg tablet, Take 3 tablets (975 mg total) by mouth every 6 (six) hours as needed for mild pain, Disp: 270 tablet, Rfl: 0  •  albuterol (PROVENTIL HFA,VENTOLIN HFA) 90 mcg/act inhaler, Inhale 2 puffs every 4 (four) hours as needed for wheezing, Disp: , Rfl:   •  apixaban (Eliquis) 5 mg, Take 1 tablet (5 mg total) by mouth 2 (two) times a day, Disp: 60 tablet, Rfl: 3  •  aspirin 81 mg chewable tablet, Chew 81 " mg daily, Disp: , Rfl:   •  atorvastatin (LIPITOR) 10 mg tablet, Take 1 tablet by mouth daily, Disp: , Rfl:   •  Continuous Blood Gluc  (Dexcom G7 ) CYN, Use 1 each continuous, Disp: 1 each, Rfl: 0  •  Continuous Glucose Sensor (Dexcom G7 Sensor), Use 1 Device every 10 days, Disp: 9 each, Rfl: 1  •  Diclofenac Sodium (VOLTAREN) 1 %, Apply 2 g topically 4 (four) times a day, Disp: 100 g, Rfl: 3  •  fludrocortisone (FLORINEF) 0.1 mg tablet, TAKE 2 TABLETS BY MOUTH EVERY DAY, Disp: 180 tablet, Rfl: 1  •  furosemide (LASIX) 20 mg tablet, Take 1 tablet (20 mg total) by mouth daily, Disp: 30 tablet, Rfl: 2  •  Glucagon, rDNA, (Glucagon Emergency) 1 MG KIT, Inject 1 mg as directed if needed (for glucose <50mg/dL), Disp: 1 kit, Rfl: 1  •  hydrocortisone (CORTEF) 5 mg tablet, USE 3 TABLETS IN MORNING AND 2 TABLETS DAILY AFTERNOON, Disp: 150 tablet, Rfl: 6  •  insulin glargine (Toujeo Max SoloStar) 300 units/mL CONCENTRATED U-300 injection pen (2-unit dial), Inject 14 Units under the skin daily at bedtime, Disp: 3 mL, Rfl: 2  •  insulin lispro (HumALOG/ADMELOG) 100 units/mL injection, Use 4u TIDAC plus 1u/50mg above 150mg/dL; max 25u /day, Disp: 18 mL, Rfl: 1  •  Insulin Pen Needle (BD Pen Needle Josie 2nd Gen) 32G X 4 MM MISC, For use with insulin pen 4 times daily. Pharmacy may dispense brand covered by insurance., Disp: 200 each, Rfl: 3  •  Insulin Pen Needle (BD Pen Needle Josie 2nd Gen) 32G X 4 MM MISC, FOR USE WITH INSULIN PEN. PHARMACY MAY DISPENSE BRAND COVERED BY INSURANCE., Disp: 200 each, Rfl: 5  •  lidocaine (LIDODERM) 5 %, Apply 1 patch topically over 12 hours daily as needed (pain) Remove & Discard patch within 12 hours as needed, Disp: 9 patch, Rfl: 1  •  Magnesium Oxide 400 MG CAPS, Take 1 tablet (400 mg total) by mouth 2 (two) times a day, Disp: 180 tablet, Rfl: 3  •  meloxicam (MOBIC) 7.5 mg tablet, TAKE 1 TABLET BY MOUTH EVERY DAY., Disp: 30 tablet, Rfl: 0  •  methocarbamol (ROBAXIN) 500 mg  tablet, Take 1 tablet (500 mg total) by mouth 3 (three) times a day as needed for muscle spasms, Disp: 30 tablet, Rfl: 1  •  oxyCODONE (ROXICODONE) 5 immediate release tablet, Take 0.5 tablets (2.5 mg total) by mouth every 4 (four) hours as needed for severe pain for up to 10 doses Max Daily Amount: 15 mg (Patient not taking: Reported on 4/23/2025), Disp: 0.5 tablet, Rfl: 0  •  potassium chloride (Klor-Con) 10 mEq tablet, Take 1 tablet (10 mEq total) by mouth in the morning, Disp: 30 tablet, Rfl: 0  •  senna-docusate sodium (SENOKOT S) 8.6-50 mg per tablet, Take 1 tablet by mouth daily at bedtime, Disp: 30 tablet, Rfl: 0  •  sertraline (ZOLOFT) 100 mg tablet, Take 100 mg by mouth daily, Disp: , Rfl:     Social History     Socioeconomic History   • Marital status: Single     Spouse name: Not on file   • Number of children: Not on file   • Years of education: Not on file   • Highest education level: Not on file   Occupational History   • Not on file   Tobacco Use   • Smoking status: Former     Current packs/day: 0.25     Average packs/day: 0.3 packs/day for 30.0 years (7.5 ttl pk-yrs)     Types: Cigarettes   • Smokeless tobacco: Never   Vaping Use   • Vaping status: Never Used   Substance and Sexual Activity   • Alcohol use: Not Currently     Alcohol/week: 5.0 standard drinks of alcohol     Types: 5 Cans of beer per week     Comment: Quit in august 2022   • Drug use: Never   • Sexual activity: Not Currently   Other Topics Concern   • Not on file   Social History Narrative    ** Merged History Encounter **          Social Drivers of Health     Financial Resource Strain: Not on file   Food Insecurity: No Food Insecurity (2/14/2025)    Nursing - Inadequate Food Risk Classification    • Worried About Running Out of Food in the Last Year: Never true    • Ran Out of Food in the Last Year: Never true    • Ran Out of Food in the Last Year: Never true   Transportation Needs: Unmet Transportation Needs (2/14/2025)    Nursing -  Transportation Risk Classification    • Lack of Transportation: Not on file    • Lack of Transportation: Yes   Physical Activity: Not on file   Stress: Not on file   Social Connections: Not on file   Intimate Partner Violence: Unknown (2025)    Nursing IPS    • Feels Physically and Emotionally Safe: Not on file    • Physically Hurt by Someone: Not on file    • Humiliated or Emotionally Abused by Someone: Not on file    • Physically Hurt by Someone: No    • Hurt or Threatened by Someone: No   Housing Stability: Unknown (2025)    Nursing: Inadequate Housing Risk Classification    • Has Housing: Not on file    • Worried About Losing Housing: Not on file    • Unable to Get Utilities: Not on file    • Unable to Pay for Housing in the Last Year: No    • Has Housin       Family History   Problem Relation Age of Onset   • Heart disease Mother    • Cancer Father    • Multiple sclerosis Sister        Physical Exam  Vitals and nursing note reviewed.   Constitutional:       General: He is not in acute distress.  HENT:      Head: Normocephalic and atraumatic.   Eyes:      Extraocular Movements: Extraocular movements intact.      Conjunctiva/sclera: Conjunctivae normal.   Cardiovascular:      Rate and Rhythm: Normal rate and regular rhythm.      Pulses: Intact distal pulses.      Heart sounds: Normal heart sounds.   Pulmonary:      Effort: Pulmonary effort is normal.      Breath sounds: Decreased breath sounds present.   Abdominal:      General: Bowel sounds are normal.      Palpations: Abdomen is soft.   Musculoskeletal:         General: Normal range of motion.      Cervical back: Normal range of motion and neck supple.      Right lower leg: Edema present.      Left lower leg: Edema present.   Skin:     General: Skin is warm and dry.   Neurological:      Mental Status: He is alert and oriented to person, place, and time.   Psychiatric:         Mood and Affect: Mood normal.         Vitals: There were no vitals taken  "for this visit.   Wt Readings from Last 3 Encounters:   04/23/25 85.3 kg (188 lb)   03/12/25 85.3 kg (188 lb)   02/14/25 85.4 kg (188 lb 4.4 oz)         Labs & Results:  Lab Results   Component Value Date    WBC 9.24 02/17/2025    HGB 8.1 (L) 02/17/2025    HCT 25.2 (L) 02/17/2025    MCV 96 02/17/2025     02/17/2025     BNP   Date Value Ref Range Status   02/14/2025 360 (H) 0 - 100 pg/mL Final   06/13/2024 417 (H) 0 - 100 pg/mL Final   12/19/2023 666 (H) 0 - 100 pg/mL Final     No components found for: \"CHEM\"    No results found for this or any previous visit.    No results found for this or any previous visit.        This note was completed in part utilizing [employer] direct voice recognition software.   Grammatical errors, random word insertion, spelling mistakes, and incomplete sentences may be an occasional consequence of the system secondary to software limitations, ambient noise and hardware issues. At the time of dictation, efforts were made to edit, clarify and /or correct errors.  Please read the chart carefully and recognize, using context, where substitutions have occurred.  If you have any questions or concerns about the context, text or information contained within the body of this dictation, please contact myself, the provider, for further clarification        NACHO Gonzalez  5/5/2025  5:15 AM  Sign when Signing Visit  Cardiology  Follow Up   Office Visit Note -     Karson You   70 y.o.   male   MRN: [id number]  [facility] [place]  [address]  [address]  [facility] [address]  [phone]    PCP: NACHO Kenney  Cardiologist : Dr Reza                Summary of Plan:  Heart healthy diet education provided  Fasting lipid profile  Follow up will be scheduled with Dr Reza   Colon Ca screening: 10/05/2023, up-to-date      Impression/plan  chronic HFpEF   Wt Readings from Last 3 Encounters:   04/23/25 85.3 kg (188 lb)   03/12/25 85.3 " kg (188 lb)   02/14/25 85.4 kg (188 lb 4.4 oz)   --beta-blocker:    --Diuretic: Lasix 20 mg daily   --2 g sodium diet, 1800 cc fluid restriction. Daily weights.   PAF  [id number] 5:   Maintained on Eliquis 5 mg BID  Maintaining normal sinus rhythm  Secondary adrenal insufficiency following with endocrinology  Maintained on hydrocortisone   Advised to double dose for sickness/stress.   Orthostatic hypotension-  Maintained on florinef 0.2 mg daily.  Off midodrine***  Uses abdominal binder and compression stockings.   hydrate  BP ***  Periodic home blood pressure monitoring, given he is at risk for hypotension given treatment for orthostatic hypotension  CKD 2  baseline creatinine 0.9 followed by nephrology  Stable  Hyperlipidemia.   On atorvastatin 10 mg/d.    8/2022 LDL 64 non-HDL 81.  Due for reassessment  Heart healthy diet: Education provided  Chronic anemia  Type 1 diabetes mellitus with polyneuropathy, on insulin  1/19/2025 Hemoglobin A1c 8.5, per endocrinology  History CVA, maintained on aspirin and statin  Cardiac testing  SPECT 8/20/2022 [facility] No evidence of ischemia. Likely mild diaphragmatic attenuation artifact at proximal inferior wall.  Normal left ventricular wall motion and ejection fraction.   TTE 12/5/2023.  EF 60%.  Wall motion normal.  Diastolic function normal.  RV normal size and function.                HPI:   Karson You is a 70 y.o.year old male with secondary adrenal insufficiency, orthostatic hypotension, type 1 diabetes mellitus, anemia, paroxysmal atrial fibrillation,who has a  history of CVA.  He follows with Dr. Reza.  He was last seen by telemedicine 2/23/2023.  His main issue has been adrenal insufficiency and orthostatic hypotension.  He has been maintained on high-dose midodrine 15 mg 3 times daily, Florinef 0.2 mg daily, hydrocortisone 20 mg in the morning 50 mg in the p.m. and salt tablets 1 g 3 times daily.  He is also followed carefully with endocrinology and neurology.  There  has been on some discussion whether he would benefit from Northera.  He is taking care of very closely by his daughter    3/12/2024 ED admission   CC: Confusion  Workup disclosed a potassium level of 3.1 which was repleted    6/13/2024 Acute visit  June 6 MyChart messaging from patient's daughter her father had edema of the legs hands and weight was increased by 10 pounds.  Prescribed Lasix 20 mg x 3 days and then advised to stop    She notified us June 11 that there was no change in his symptoms and the blood pressure was elevated.    Advised by his cardiologist to schedule an office visit  ROS: He is short of breath.  He has lower extremity edema.  Has increasing cough.  EKG normal sinus rhythm 77 bpm  /85  Weight 196 pounds.  Home weight 184 pounds this week.  Per the daughter 20 pounds over his dry weight  She has been holding the salt tablets.  She has also been holding the midodrine    Today he is volume overloaded.  He is no acute distress however.  Will obtain nonfasting baseline labs today  Will make Lasix 20 mg daily.  Will make midodrine as needed.  BP parameters as above.  Will obtain follow-up labs around the 25th.  I recommend a salt restricted diet for now      2/7/25   WERNER Cintron NP  Per her note:  69 y.o.  male  with PMH as below is here for  routine visit  Patient of Dr. Reza  Admitted 1/7-1/11/25  per note review, had a fall, but Daughter states he did not fall at home. His sugar was really josue and he was spaced out and the family sat him in a chair.. likely secondary to orthostatic hypotension.  Orthostatics were positive.  He was placed on scheduled midodrine in addition to his Florinef.  He is also on Cortef for history of adrenal insufficiency and is known to endocrinology outpatient.  Orthostatic hypotension likely related to autonomic dysfunction from longstanding history of diabetes mellitus.      Today,  he is now on midodrine 5 mg TID PRN. He has not needed to take the midodrine  "since rehab because his BP kept running high  He wears the abdominal binder. He develop a wound from dominguez stocking and has not worn them since rehab.     Will RTO in 3 months or sooner if necessary     Weight 183 pound      5/6/25  Close cardiology follow-up    Interval history:  Last office visit with Dr. Reza 2/7/23  He had 3 admissions in 2025, - 2 in January, 1 in February  In the first admission he had a fall secondary to orthostasis.  Orthostatics were positive.  Midodrine was added to Florinef.  His insulin regimen was adjusted.  Discharged to [facility] rehab  January 21 he was admitted with hyperglycemia from rehab.  Discharged on Lasix 20 mg daily  Admitted 2/14-2/18/25 after a mechanical fall where he sustained a closed fracture of left hip, pelvic fracture-comminuted angulated intertrochanteric femoral fracture\".   No ICH despite Eliquis.  [place] not to have a head strike  intramedullary femoral nail on 2/15.Patient did have a drop in his hemoglobin to 7.0   Discharged to short-term rehab at [facility]  Endo: [id number] ***                       Assessment  There are no diagnoses linked to this encounter.      Past Medical History:   Diagnosis Date   • A-fib ([facility])    • Adrenal insufficiency ([facility])    • Atrial fibrillation ([facility])    • Diabetes mellitus ([facility])    • History of stroke 10/19/2022   • Obesity, morbid ([facility]) 11/14/2022   • Shock ([facility]) 12/14/2023   • Stroke ([facility])    • Type 2 MI (myocardial infarction) ([facility]) 11/03/2022       Review of Systems   Constitutional: Positive for weight gain. Negative for chills.   Cardiovascular:  Positive for dyspnea on exertion and leg swelling. Negative for chest pain, claudication, cyanosis, irregular heartbeat, near-syncope, orthopnea, palpitations, paroxysmal nocturnal dyspnea and syncope.   Respiratory:  Negative for cough and shortness of breath.    Gastrointestinal:  Negative for heartburn and nausea.   Neurological:  Negative for dizziness, focal weakness, headaches, " light-headedness and weakness.   All other systems reviewed and are negative.      Allergies   Allergen Reactions   • Imipramine Other (See Comments)   • Lisinopril Other (See Comments)   • Paroxetine Other (See Comments)   • Penicillins Hives   • Rosiglitazone Other (See Comments)   • Troglitazone Other (See Comments)     .    Current Outpatient Medications:   •  acetaminophen (TYLENOL) 325 mg tablet, Take 3 tablets (975 mg total) by mouth every 6 (six) hours as needed for mild pain, Disp: 270 tablet, Rfl: 0  •  albuterol (PROVENTIL HFA,VENTOLIN HFA) 90 mcg/act inhaler, Inhale 2 puffs every 4 (four) hours as needed for wheezing, Disp: , Rfl:   •  apixaban (Eliquis) 5 mg, Take 1 tablet (5 mg total) by mouth 2 (two) times a day, Disp: 60 tablet, Rfl: 3  •  aspirin 81 mg chewable tablet, Chew 81 mg daily, Disp: , Rfl:   •  atorvastatin (LIPITOR) 10 mg tablet, Take 1 tablet by mouth daily, Disp: , Rfl:   •  Continuous Blood Gluc  (Dexcom G7 ) CYN, Use 1 each continuous, Disp: 1 each, Rfl: 0  •  Continuous Glucose Sensor (Dexcom G7 Sensor), Use 1 Device every 10 days, Disp: 9 each, Rfl: 1  •  Diclofenac Sodium (VOLTAREN) 1 %, Apply 2 g topically 4 (four) times a day, Disp: 100 g, Rfl: 3  •  fludrocortisone (FLORINEF) 0.1 mg tablet, TAKE 2 TABLETS BY MOUTH EVERY DAY, Disp: 180 tablet, Rfl: 1  •  furosemide (LASIX) 20 mg tablet, Take 1 tablet (20 mg total) by mouth daily, Disp: 30 tablet, Rfl: 2  •  Glucagon, rDNA, (Glucagon Emergency) 1 MG KIT, Inject 1 mg as directed if needed (for glucose <50mg/dL), Disp: 1 kit, Rfl: 1  •  hydrocortisone (CORTEF) 5 mg tablet, USE 3 TABLETS IN MORNING AND 2 TABLETS DAILY AFTERNOON, Disp: 150 tablet, Rfl: 6  •  insulin glargine (Toujeo Max SoloStar) 300 units/mL CONCENTRATED U-300 injection pen (2-unit dial), Inject 14 Units under the skin daily at bedtime, Disp: 3 mL, Rfl: 2  •  insulin lispro (HumALOG/ADMELOG) 100 units/mL injection, Use 4u TIDAC plus 1u/50mg above  150mg/dL; max 25u /day, Disp: 18 mL, Rfl: 1  •  Insulin Pen Needle (BD Pen Needle Josie 2nd Gen) 32G X 4 MM MISC, For use with insulin pen 4 times daily. Pharmacy may dispense brand covered by insurance., Disp: 200 each, Rfl: 3  •  Insulin Pen Needle (BD Pen Needle Josie 2nd Gen) 32G X 4 MM MISC, FOR USE WITH INSULIN PEN. PHARMACY MAY DISPENSE BRAND COVERED BY INSURANCE., Disp: 200 each, Rfl: 5  •  lidocaine (LIDODERM) 5 %, Apply 1 patch topically over 12 hours daily as needed (pain) Remove & Discard patch within 12 hours as needed, Disp: 9 patch, Rfl: 1  •  Magnesium Oxide 400 MG CAPS, Take 1 tablet (400 mg total) by mouth 2 (two) times a day, Disp: 180 tablet, Rfl: 3  •  meloxicam (MOBIC) 7.5 mg tablet, TAKE 1 TABLET BY MOUTH EVERY DAY., Disp: 30 tablet, Rfl: 0  •  methocarbamol (ROBAXIN) 500 mg tablet, Take 1 tablet (500 mg total) by mouth 3 (three) times a day as needed for muscle spasms, Disp: 30 tablet, Rfl: 1  •  oxyCODONE (ROXICODONE) 5 immediate release tablet, Take 0.5 tablets (2.5 mg total) by mouth every 4 (four) hours as needed for severe pain for up to 10 doses Max Daily Amount: 15 mg (Patient not taking: Reported on 4/23/2025), Disp: 0.5 tablet, Rfl: 0  •  potassium chloride (Klor-Con) 10 mEq tablet, Take 1 tablet (10 mEq total) by mouth in the morning, Disp: 30 tablet, Rfl: 0  •  senna-docusate sodium (SENOKOT S) 8.6-50 mg per tablet, Take 1 tablet by mouth daily at bedtime, Disp: 30 tablet, Rfl: 0  •  sertraline (ZOLOFT) 100 mg tablet, Take 100 mg by mouth daily, Disp: , Rfl:     Social History     Socioeconomic History   • Marital status: Single     Spouse name: Not on file   • Number of children: Not on file   • Years of education: Not on file   • Highest education level: Not on file   Occupational History   • Not on file   Tobacco Use   • Smoking status: Former     Current packs/day: 0.25     Average packs/day: 0.3 packs/day for 30.0 years (7.5 ttl pk-yrs)     Types: Cigarettes   • Smokeless  tobacco: Never   Vaping Use   • Vaping status: Never Used   Substance and Sexual Activity   • Alcohol use: Not Currently     Alcohol/week: 5.0 standard drinks of alcohol     Types: 5 Cans of beer per week     Comment: Quit in 2022   • Drug use: Never   • Sexual activity: Not Currently   Other Topics Concern   • Not on file   Social History Narrative    ** Merged History Encounter **          Social Drivers of Health     Financial Resource Strain: Not on file   Food Insecurity: No Food Insecurity (2025)    Nursing - Inadequate Food Risk Classification    • Worried About Running Out of Food in the Last Year: Never true    • Ran Out of Food in the Last Year: Never true    • Ran Out of Food in the Last Year: Never true   Transportation Needs: Unmet Transportation Needs (2025)    Nursing - Transportation Risk Classification    • Lack of Transportation: Not on file    • Lack of Transportation: Yes   Physical Activity: Not on file   Stress: Not on file   Social Connections: Not on file   Intimate Partner Violence: Unknown (2025)    Nursing IPS    • Feels Physically and Emotionally Safe: Not on file    • Physically Hurt by Someone: Not on file    • Humiliated or Emotionally Abused by Someone: Not on file    • Physically Hurt by Someone: No    • Hurt or Threatened by Someone: No   Housing Stability: Unknown (2025)    Nursing: Inadequate Housing Risk Classification    • Has Housing: Not on file    • Worried About Losing Housing: Not on file    • Unable to Get Utilities: Not on file    • Unable to Pay for Housing in the Last Year: No    • Has Housin       Family History   Problem Relation Age of Onset   • Heart disease Mother    • Cancer Father    • Multiple sclerosis Sister        Physical Exam  Vitals and nursing note reviewed.   Constitutional:       General: He is not in acute distress.  HENT:      Head: Normocephalic and atraumatic.   Eyes:      Extraocular Movements: Extraocular movements  "intact.      Conjunctiva/sclera: Conjunctivae normal.   Cardiovascular:      Rate and Rhythm: Normal rate and regular rhythm.      Pulses: Intact distal pulses.      Heart sounds: Normal heart sounds.   Pulmonary:      Effort: Pulmonary effort is normal.      Breath sounds: Decreased breath sounds present.   Abdominal:      General: Bowel sounds are normal.      Palpations: Abdomen is soft.   Musculoskeletal:         General: Normal range of motion.      Cervical back: Normal range of motion and neck supple.      Right lower leg: Edema present.      Left lower leg: Edema present.   Skin:     General: Skin is warm and dry.   Neurological:      Mental Status: He is alert and oriented to person, place, and time.   Psychiatric:         Mood and Affect: Mood normal.         Vitals: There were no vitals taken for this visit.   Wt Readings from Last 3 Encounters:   04/23/25 85.3 kg (188 lb)   03/12/25 85.3 kg (188 lb)   02/14/25 85.4 kg (188 lb 4.4 oz)         Labs & Results:  Lab Results   Component Value Date    WBC 9.24 02/17/2025    HGB 8.1 (L) 02/17/2025    HCT 25.2 (L) 02/17/2025    MCV 96 02/17/2025     02/17/2025     BNP   Date Value Ref Range Status   02/14/2025 360 (H) 0 - 100 pg/mL Final   06/13/2024 417 (H) 0 - 100 pg/mL Final   12/19/2023 666 (H) 0 - 100 pg/mL Final     No components found for: \"CHEM\"    No results found for this or any previous visit.    No results found for this or any previous visit.        This note was completed in part utilizing [employer] direct voice recognition software.   Grammatical errors, random word insertion, spelling mistakes, and incomplete sentences may be an occasional consequence of the system secondary to software limitations, ambient noise and hardware issues. At the time of dictation, efforts were made to edit, clarify and /or correct errors.  Please read the chart carefully and recognize, using context, where substitutions have occurred.  If you have any " questions or concerns about the context, text or information contained within the body of this dictation, please contact myself, the provider, for further clarification

## 2025-05-06 NOTE — PATIENT INSTRUCTIONS
"Patient Education     DASH diet   The Basics   Written by the doctors and editors at [facility]   What is the DASH diet? -- DASH stands for \"dietary approaches to stop hypertension.\" It is an eating plan that can help lower blood pressure. It can also help prevent high blood pressure, which doctors call \"hypertension.\" You don't need special foods or recipes to follow the DASH diet. It is more about eating certain types of foods in certain amounts.  The DASH diet has lots of fruits and vegetables, whole grains, lean meats, healthy fats, and low-fat or fat-free dairy products (figure 1). It is low in saturated fats, trans fats, cholesterol, added sugars, and sodium (salt).  The standard DASH diet limits sodium to no more than 2300 mg a day. Your doctor or nurse can talk to you about what your specific goals should be.  Why do I need the DASH diet? -- The DASH diet can help you:   Lower your blood pressure and cholesterol   Lower your risk for cancer, heart disease, heart attack, and stroke. It might also lower your risk for heart failure, kidney stones, and diabetes.   Lose weight or keep a healthy weight  What can I eat and drink on the DASH diet? -- Below are some guidelines and examples for your daily and weekly nutrition goals. These are based on a 2000-calorie-per-day eating plan.  Daily goals:   Grains - Try to eat 6 to 8 servings of whole-grain, high-fiber foods each day. Examples of a serving include 1 slice of bread, 1 ounce (30 grams) of dry cereal, or 1/2 cup (120 grams) of cooked cereal, pasta, or brown rice.   Fruits - Try to eat 4 to 5 servings of fruit each day. Examples of a serving include 1 medium fruit or 1/2 cup (75 grams) of fresh, frozen, or canned fruit. Try to eat different kinds and colors. Frozen or canned fruit should not have added sugar. Look for frozen or canned fruits with 100 percent fruit juice or water.   Vegetables - Try to eat 4 to 5 servings of vegetables each day. Examples of a " "serving include 1 cup (40 grams) of leafy greens or 1/2 cup (75 grams) of fresh or cooked vegetables. Try to pick many kinds and colors. If you buy canned vegetables, look for \"low sodium\" or \"salt free.\" Buy plain, frozen vegetables to avoid added fat and sodium.   Dairy - Try to eat 2 to 3 servings of fat-free or low-fat milk products each day. Examples of a serving include 1 cup (240 mL) of milk or yogurt or 1.5 ounces (45 grams) of cheese.   Lean meats, poultry, and seafood - Try to eat 6 or fewer servings of lean meat, poultry, and seafood each day. Examples of a serving include 1 egg or 1 ounce (30 grams) of cooked meat, poultry, or fish. Try to choose more low-fat or lean meats like chicken, fish, or turkey. Eat less red meat.   Fats and oils - Try to eat 2 to 3 servings of fats and oils each day. Examples of a serving include 1 teaspoon (5 mL) of soft margarine or vegetable oil, or 1 tablespoon (18 grams) of mayonnaise. Eat healthy fats like those found in fish, nuts, and avocados. Try using olive oil or vegetable oils such as canola oil. You can also try corn, safflower, sunflower, or soybean oils. Use low-sodium and low-fat salad dressing and mayonnaise.  Weekly goals:   Nuts, seeds, and legumes (dry beans and peas) - Try to eat 4 to 5 servings each week. Examples of a serving include 1/3 cup (45 grams) of nuts, 2 tablespoons (50 grams) of nut butter or seeds, or 1/2 cup (75 grams) of cooked legumes. Try almonds and walnuts, sunflower seeds, peanut or other nut butters, soybeans, lentils, kidney beans, and split peas.   Sweets - Try to eat fewer than 5 servings each week. Examples of a serving include 1 tablespoon (14 grams) of sugar or jelly, or 1/2 cup (120 grams) of gelatin. Choose low-fat and trans fat-free desserts. These include fruit-flavored gelatin, sorbet, jellybeans, rico crackers, animal crackers, low-fat fig bars, and mac snaps. Eat fruit to satisfy the desire for sweets.  To add flavor, " use pepper, herbs, spices, vinegar, or lemon or lime juices. Choose low-sodium or salt-free products whenever you can. This is especially important for foods like broths, soups, or soy sauce.  What foods and drinks should I avoid on the DASH diet?    Grains to avoid - Salted breads, rolls, crackers, quick breads, self-rising flours, biscuit mixes, regular breadcrumbs, instant hot cereals, commercially prepared rice, pasta, stuffing mixes.   Fruits and vegetables to avoid - Store-bought prepared potatoes and vegetable mixes, regular canned vegetables and juices, vegetables frozen with sauce, pickled vegetables, processed fruits with salt or sodium.   Dairy products to avoid - Whole milk, malted milk, chocolate milk, buttermilk, full-fat cheese, ice cream.   Meats to avoid - Smoked, cured, salted, or canned fish such as sardines or anchovies. High-fat cuts of meat like beef, lamb, pork, lemus and sausage, and chicken with the skin on it.   Fats and oils to avoid - Eat fewer solid fats like butter, lard, and hard stick margarine. Eat less saturated fat, trans fat, and total fat.   Condiments and snacks to avoid - Salted and canned peas, beans, and olives. Salted snack foods, fried foods, soda, other sweetened drinks.   Sweets to avoid - High-fat baked goods such as muffins, donuts, pastries, and commercial baked goods. Candy bars.   Alcohol - If you choose to drink alcohol, limit the amount. Most doctors recommend limiting alcohol to no more than 1 drink a day (for females) or 2 drinks a day (for males).  What else do I need to know?    Get regular physical activity to make this diet help you even more. Even gentle forms of activity, like walking, are good for your health.   Try baking or broiling instead of frying foods.   Write down the foods that you eat. This will help you track what you have eaten each week.   When you go to the grocery store, have a list or a meal plan. Don't shop when you are hungry, since this  might lead you to buy more unhealthy foods.   Read food labels with care (figure 2). They show you how much is in a serving. The amount is given as a percentage of the total amount that you need each day. Reading labels helps you make healthy food choices.  All topics are updated as new evidence becomes available and our peer review process is complete.  This topic retrieved from [employer] on: Feb 26, 2024.  Topic 457443 Version 1.0  Release: 32.2.4 - C32.56  © 2024 UpToDate, Inc. and/or its affiliates. All rights reserved.  figure 1: DASH diet     Graphic [id number] Version 1.0  figure 2: Food label     Graphic [id number] Version 1.0  Consumer Information Use and Disclaimer   Disclaimer: This generalized information is a limited summary of diagnosis, treatment, and/or medication information. It is not meant to be comprehensive and should be used as a tool to help the user understand and/or assess potential diagnostic and treatment options. It does NOT include all information about conditions, treatments, medications, side effects, or risks that may apply to a specific patient. It is not intended to be medical advice or a substitute for the medical advice, diagnosis, or treatment of a health care provider based on the health care provider's examination and assessment of a patient's specific and unique circumstances. Patients must speak with a health care provider for complete information about their health, medical questions, and treatment options, including any risks or benefits regarding use of medications. This information does not endorse any treatments or medications as safe, effective, or approved for treating a specific patient. UpToDate, Inc. and its affiliates disclaim any warranty or liability relating to this information or the use thereof.The use of this information is governed by the Terms of Use, available at https://www.woltersStoryvineuwer.com/en/know/clinical-effectiveness-terms. 2024© UpToDate, Inc. and its  affiliates and/or licensors. All rights reserved.  Copyright   © 2024 [employer] and/or its affiliates. All rights reserved.

## 2025-05-13 ENCOUNTER — TELEPHONE (OUTPATIENT)
Age: 70
End: 2025-05-13

## 2025-05-13 NOTE — TELEPHONE ENCOUNTER
Pts daughter called. She is stuck at work and called to cancel his virtual appt.  She is asking if you can review his dexcom report and call with any changes

## 2025-05-21 ENCOUNTER — HOSPITAL ENCOUNTER (INPATIENT)
Facility: HOSPITAL | Age: 70
LOS: 6 days | Discharge: HOME HEALTH | DRG: 638 | End: 2025-05-28
Attending: SURGERY | Admitting: INTERNAL MEDICINE
Payer: COMMERCIAL

## 2025-05-21 ENCOUNTER — APPOINTMENT (EMERGENCY)
Dept: RADIOLOGY | Facility: HOSPITAL | Age: 70
DRG: 638 | End: 2025-05-21
Attending: SURGERY
Payer: COMMERCIAL

## 2025-05-21 ENCOUNTER — APPOINTMENT (OUTPATIENT)
Dept: RADIOLOGY | Facility: HOSPITAL | Age: 70
DRG: 638 | End: 2025-05-21
Payer: COMMERCIAL

## 2025-05-21 DIAGNOSIS — N18.5 CKD (CHRONIC KIDNEY DISEASE) STAGE 5, GFR LESS THAN 15 ML/MIN (HCC): ICD-10-CM

## 2025-05-21 DIAGNOSIS — I95.1 ORTHOSTATIC HYPOTENSION: ICD-10-CM

## 2025-05-21 DIAGNOSIS — I50.32 CHRONIC HEART FAILURE WITH PRESERVED EJECTION FRACTION (HCC): ICD-10-CM

## 2025-05-21 DIAGNOSIS — R55 SYNCOPE: Primary | ICD-10-CM
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
## 2025-05-21 DIAGNOSIS — E10.9 TYPE 1 DIABETES MELLITUS (HCC): ICD-10-CM

## 2025-05-21 DIAGNOSIS — K59.00 CONSTIPATION: ICD-10-CM

## 2025-05-21 DIAGNOSIS — Z86.73 HISTORY OF CEREBROVASCULAR ACCIDENT (CVA) IN ADULTHOOD: ICD-10-CM

## 2025-05-21 DIAGNOSIS — E83.51 HYPOCALCEMIA: ICD-10-CM

## 2025-05-21 DIAGNOSIS — E27.40 ADRENAL INSUFFICIENCY (HCC): ICD-10-CM

## 2025-05-21 DIAGNOSIS — F41.9 ANXIETY: ICD-10-CM

## 2025-05-21 DIAGNOSIS — E16.2 HYPOGLYCEMIA: ICD-10-CM

## 2025-05-21 DIAGNOSIS — I48.0 PAROXYSMAL A-FIB (HCC): ICD-10-CM

## 2025-05-21 PROBLEM — I73.9 PAD (PERIPHERAL ARTERY DISEASE) (HCC): Status: ACTIVE | Noted: 2025-05-21

## 2025-05-21 PROBLEM — D50.9 IRON DEFICIENCY ANEMIA: Status: ACTIVE | Noted: 2025-05-21

## 2025-05-21 PROBLEM — E87.6 HYPOKALEMIA: Status: ACTIVE | Noted: 2025-05-21

## 2025-05-21 PROBLEM — M48.10 DISH (DIFFUSE IDIOPATHIC SKELETAL HYPEROSTOSIS): Chronic | Status: ACTIVE | Noted: 2025-05-21

## 2025-05-21 PROBLEM — R65.10 SIRS (SYSTEMIC INFLAMMATORY RESPONSE SYNDROME) (HCC): Status: ACTIVE | Noted: 2025-05-21

## 2025-05-21 LAB
2HR DELTA HS TROPONIN: 3 NG/L
2HR DELTA HS TROPONIN: 3 NG/L
4HR DELTA HS TROPONIN: 6 NG/L
4HR DELTA HS TROPONIN: 6 NG/L
ABO GROUP BLD: NORMAL
ALBUMIN SERPL BCG-MCNC: 3.4 G/DL (ref 3.5–5)
ALBUMIN SERPL BCG-MCNC: 3.4 G/DL (ref 3.5–5)
ALP SERPL-CCNC: 136 U/L (ref 34–104)
ALP SERPL-CCNC: 136 U/L (ref 34–104)
ALT SERPL W P-5'-P-CCNC: 13 U/L (ref 7–52)
ALT SERPL W P-5'-P-CCNC: 13 U/L (ref 7–52)
ANION GAP SERPL CALCULATED.3IONS-SCNC: 9 MMOL/L (ref 4–13)
ANION GAP SERPL CALCULATED.3IONS-SCNC: 9 MMOL/L (ref 4–13)
APTT PPP: 37 SECONDS (ref 23–34)
APTT PPP: 37 SECONDS (ref 23–34)
AST SERPL W P-5'-P-CCNC: 34 U/L (ref 13–39)
AST SERPL W P-5'-P-CCNC: 34 U/L (ref 13–39)
BASE EXCESS BLDA CALC-SCNC: 8 MMOL/L (ref -2–3)
BASE EXCESS BLDA CALC-SCNC: 8 MMOL/L (ref -2–3)
BASOPHILS # BLD AUTO: 0.03 THOUSANDS/ÂΜL (ref 0–0.1)
BASOPHILS # BLD AUTO: 0.03 THOUSANDS/ÂΜL (ref 0–0.1)
BASOPHILS NFR BLD AUTO: 0 % (ref 0–1)
BASOPHILS NFR BLD AUTO: 0 % (ref 0–1)
BILIRUB SERPL-MCNC: 0.91 MG/DL (ref 0.2–1)
BILIRUB SERPL-MCNC: 0.91 MG/DL (ref 0.2–1)
BLD GP AB SCN SERPL QL: NEGATIVE
BLD GP AB SCN SERPL QL: NEGATIVE
BUN SERPL-MCNC: 11 MG/DL (ref 5–25)
BUN SERPL-MCNC: 11 MG/DL (ref 5–25)
CA-I BLD-SCNC: 0.85 MMOL/L (ref 1.12–1.32)
CA-I BLD-SCNC: 0.85 MMOL/L (ref 1.12–1.32)
CALCIUM ALBUM COR SERPL-MCNC: 7.5 MG/DL (ref 8.3–10.1)
CALCIUM ALBUM COR SERPL-MCNC: 7.5 MG/DL (ref 8.3–10.1)
CALCIUM SERPL-MCNC: 7 MG/DL (ref 8.4–10.2)
CALCIUM SERPL-MCNC: 7 MG/DL (ref 8.4–10.2)
CARDIAC TROPONIN I PNL SERPL HS: 93 NG/L (ref ?–50)
CARDIAC TROPONIN I PNL SERPL HS: 93 NG/L (ref ?–50)
CARDIAC TROPONIN I PNL SERPL HS: 96 NG/L (ref ?–50)
CARDIAC TROPONIN I PNL SERPL HS: 96 NG/L (ref ?–50)
CARDIAC TROPONIN I PNL SERPL HS: 99 NG/L (ref ?–50)
CARDIAC TROPONIN I PNL SERPL HS: 99 NG/L (ref ?–50)
CHLORIDE SERPL-SCNC: 101 MMOL/L (ref 96–108)
CHLORIDE SERPL-SCNC: 101 MMOL/L (ref 96–108)
CK SERPL-CCNC: 133 U/L (ref 39–308)
CK SERPL-CCNC: 133 U/L (ref 39–308)
CO2 SERPL-SCNC: 28 MMOL/L (ref 21–32)
CO2 SERPL-SCNC: 28 MMOL/L (ref 21–32)
CREAT SERPL-MCNC: 1.1 MG/DL (ref 0.6–1.3)
CREAT SERPL-MCNC: 1.1 MG/DL (ref 0.6–1.3)
EOSINOPHIL # BLD AUTO: 0.12 THOUSAND/ÂΜL (ref 0–0.61)
EOSINOPHIL # BLD AUTO: 0.12 THOUSAND/ÂΜL (ref 0–0.61)
EOSINOPHIL NFR BLD AUTO: 1 % (ref 0–6)
EOSINOPHIL NFR BLD AUTO: 1 % (ref 0–6)
ERYTHROCYTE [DISTWIDTH] IN BLOOD BY AUTOMATED COUNT: 15.7 % (ref 11.6–15.1)
ERYTHROCYTE [DISTWIDTH] IN BLOOD BY AUTOMATED COUNT: 15.7 % (ref 11.6–15.1)
FLUAV RNA RESP QL NAA+PROBE: NEGATIVE
FLUAV RNA RESP QL NAA+PROBE: NEGATIVE
FLUBV RNA RESP QL NAA+PROBE: NEGATIVE
FLUBV RNA RESP QL NAA+PROBE: NEGATIVE
GFR SERPL CREATININE-BSD FRML MDRD: 68 ML/MIN/1.73SQ M
GFR SERPL CREATININE-BSD FRML MDRD: 68 ML/MIN/1.73SQ M
GLUCOSE SERPL-MCNC: 40 MG/DL (ref 65–140)
GLUCOSE SERPL-MCNC: 40 MG/DL (ref 65–140)
GLUCOSE SERPL-MCNC: 50 MG/DL (ref 65–140)
GLUCOSE SERPL-MCNC: 50 MG/DL (ref 65–140)
GLUCOSE SERPL-MCNC: 68 MG/DL (ref 65–140)
GLUCOSE SERPL-MCNC: 70 MG/DL (ref 65–140)
GLUCOSE SERPL-MCNC: 70 MG/DL (ref 65–140)
GLUCOSE SERPL-MCNC: 72 MG/DL (ref 65–140)
GLUCOSE SERPL-MCNC: 72 MG/DL (ref 65–140)
GLUCOSE SERPL-MCNC: 92 MG/DL (ref 65–140)
HCO3 BLDA-SCNC: 31.7 MMOL/L (ref 24–30)
HCO3 BLDA-SCNC: 31.7 MMOL/L (ref 24–30)
HCT VFR BLD AUTO: 29.6 % (ref 36.5–49.3)
HCT VFR BLD AUTO: 29.6 % (ref 36.5–49.3)
HCT VFR BLD CALC: 30 % (ref 36.5–49.3)
HCT VFR BLD CALC: 30 % (ref 36.5–49.3)
HGB BLD-MCNC: 9.3 G/DL (ref 12–17)
HGB BLD-MCNC: 9.3 G/DL (ref 12–17)
HGB BLDA-MCNC: 10.2 G/DL (ref 12–17)
HGB BLDA-MCNC: 10.2 G/DL (ref 12–17)
IMM GRANULOCYTES # BLD AUTO: 0.17 THOUSAND/UL (ref 0–0.2)
IMM GRANULOCYTES # BLD AUTO: 0.17 THOUSAND/UL (ref 0–0.2)
IMM GRANULOCYTES NFR BLD AUTO: 1 % (ref 0–2)
IMM GRANULOCYTES NFR BLD AUTO: 1 % (ref 0–2)
INR PPP: 1.63 (ref 0.85–1.19)
INR PPP: 1.63 (ref 0.85–1.19)
LACTATE SERPL-SCNC: 0.8 MMOL/L (ref 0.5–2)
LACTATE SERPL-SCNC: 0.8 MMOL/L (ref 0.5–2)
LYMPHOCYTES # BLD AUTO: 1.24 THOUSANDS/ÂΜL (ref 0.6–4.47)
LYMPHOCYTES # BLD AUTO: 1.24 THOUSANDS/ÂΜL (ref 0.6–4.47)
LYMPHOCYTES NFR BLD AUTO: 9 % (ref 14–44)
LYMPHOCYTES NFR BLD AUTO: 9 % (ref 14–44)
MCH RBC QN AUTO: 27.5 PG (ref 26.8–34.3)
MCH RBC QN AUTO: 27.5 PG (ref 26.8–34.3)
MCHC RBC AUTO-ENTMCNC: 31.4 G/DL (ref 31.4–37.4)
MCHC RBC AUTO-ENTMCNC: 31.4 G/DL (ref 31.4–37.4)
MCV RBC AUTO: 88 FL (ref 82–98)
MCV RBC AUTO: 88 FL (ref 82–98)
MONOCYTES # BLD AUTO: 1.92 THOUSAND/ÂΜL (ref 0.17–1.22)
MONOCYTES # BLD AUTO: 1.92 THOUSAND/ÂΜL (ref 0.17–1.22)
MONOCYTES NFR BLD AUTO: 14 % (ref 4–12)
MONOCYTES NFR BLD AUTO: 14 % (ref 4–12)
NEUTROPHILS # BLD AUTO: 10.44 THOUSANDS/ÂΜL (ref 1.85–7.62)
NEUTROPHILS # BLD AUTO: 10.44 THOUSANDS/ÂΜL (ref 1.85–7.62)
NEUTS SEG NFR BLD AUTO: 75 % (ref 43–75)
NEUTS SEG NFR BLD AUTO: 75 % (ref 43–75)
NRBC BLD AUTO-RTO: 0 /100 WBCS
NRBC BLD AUTO-RTO: 0 /100 WBCS
PCO2 BLD: 33 MMOL/L (ref 21–32)
PCO2 BLD: 33 MMOL/L (ref 21–32)
PCO2 BLD: 39.1 MM HG (ref 42–50)
PCO2 BLD: 39.1 MM HG (ref 42–50)
PH BLD: 7.52 [PH] (ref 7.3–7.4)
PH BLD: 7.52 [PH] (ref 7.3–7.4)
PLATELET # BLD AUTO: 186 THOUSANDS/UL (ref 149–390)
PLATELET # BLD AUTO: 186 THOUSANDS/UL (ref 149–390)
PMV BLD AUTO: 10.5 FL (ref 8.9–12.7)
PMV BLD AUTO: 10.5 FL (ref 8.9–12.7)
PO2 BLD: 29 MM HG (ref 35–45)
PO2 BLD: 29 MM HG (ref 35–45)
POTASSIUM BLD-SCNC: 3.4 MMOL/L (ref 3.5–5.3)
POTASSIUM BLD-SCNC: 3.4 MMOL/L (ref 3.5–5.3)
POTASSIUM SERPL-SCNC: 2.8 MMOL/L (ref 3.5–5.3)
POTASSIUM SERPL-SCNC: 2.8 MMOL/L (ref 3.5–5.3)
PROT SERPL-MCNC: 6 G/DL (ref 6.4–8.4)
PROT SERPL-MCNC: 6 G/DL (ref 6.4–8.4)
PROTHROMBIN TIME: 19.5 SECONDS (ref 12.3–15)
PROTHROMBIN TIME: 19.5 SECONDS (ref 12.3–15)
RBC # BLD AUTO: 3.38 MILLION/UL (ref 3.88–5.62)
RBC # BLD AUTO: 3.38 MILLION/UL (ref 3.88–5.62)
RH BLD: POSITIVE
RSV RNA RESP QL NAA+PROBE: NEGATIVE
RSV RNA RESP QL NAA+PROBE: NEGATIVE
SAO2 % BLD FROM PO2: 62 % (ref 60–85)
SAO2 % BLD FROM PO2: 62 % (ref 60–85)
SARS-COV-2 RNA RESP QL NAA+PROBE: NEGATIVE
SARS-COV-2 RNA RESP QL NAA+PROBE: NEGATIVE
SODIUM BLD-SCNC: 139 MMOL/L (ref 136–145)
SODIUM BLD-SCNC: 139 MMOL/L (ref 136–145)
SODIUM SERPL-SCNC: 138 MMOL/L (ref 135–147)
SODIUM SERPL-SCNC: 138 MMOL/L (ref 135–147)
SPECIMEN EXPIRATION DATE: NORMAL
SPECIMEN EXPIRATION DATE: NORMAL
SPECIMEN SOURCE: ABNORMAL
SPECIMEN SOURCE: ABNORMAL
WBC # BLD AUTO: 13.92 THOUSAND/UL (ref 4.31–10.16)
WBC # BLD AUTO: 13.92 THOUSAND/UL (ref 4.31–10.16)

## 2025-05-21 PROCEDURE — 86900 BLOOD TYPING SEROLOGIC ABO: CPT | Performed by: STUDENT IN AN ORGANIZED HEALTH CARE EDUCATION/TRAINING PROGRAM

## 2025-05-21 PROCEDURE — 82330 ASSAY OF CALCIUM: CPT

## 2025-05-21 PROCEDURE — 84484 ASSAY OF TROPONIN QUANT: CPT

## 2025-05-21 PROCEDURE — 72125 CT NECK SPINE W/O DYE: CPT

## 2025-05-21 PROCEDURE — 99284 EMERGENCY DEPT VISIT MOD MDM: CPT

## 2025-05-21 PROCEDURE — 80053 COMPREHEN METABOLIC PANEL: CPT | Performed by: SURGERY

## 2025-05-21 PROCEDURE — 85014 HEMATOCRIT: CPT

## 2025-05-21 PROCEDURE — 84100 ASSAY OF PHOSPHORUS: CPT

## 2025-05-21 PROCEDURE — 86901 BLOOD TYPING SEROLOGIC RH(D): CPT | Performed by: STUDENT IN AN ORGANIZED HEALTH CARE EDUCATION/TRAINING PROGRAM

## 2025-05-21 PROCEDURE — 71250 CT THORAX DX C-: CPT

## 2025-05-21 PROCEDURE — 85730 THROMBOPLASTIN TIME PARTIAL: CPT | Performed by: SURGERY

## 2025-05-21 PROCEDURE — 70450 CT HEAD/BRAIN W/O DYE: CPT

## 2025-05-21 PROCEDURE — 82550 ASSAY OF CK (CPK): CPT | Performed by: SURGERY

## 2025-05-21 PROCEDURE — 82948 REAGENT STRIP/BLOOD GLUCOSE: CPT

## 2025-05-21 PROCEDURE — 85610 PROTHROMBIN TIME: CPT | Performed by: SURGERY

## 2025-05-21 PROCEDURE — 83605 ASSAY OF LACTIC ACID: CPT

## 2025-05-21 PROCEDURE — 83036 HEMOGLOBIN GLYCOSYLATED A1C: CPT

## 2025-05-21 PROCEDURE — 71045 X-RAY EXAM CHEST 1 VIEW: CPT

## 2025-05-21 PROCEDURE — 84145 PROCALCITONIN (PCT): CPT

## 2025-05-21 PROCEDURE — 82803 BLOOD GASES ANY COMBINATION: CPT

## 2025-05-21 PROCEDURE — 36415 COLL VENOUS BLD VENIPUNCTURE: CPT | Performed by: SURGERY

## 2025-05-21 PROCEDURE — 82947 ASSAY GLUCOSE BLOOD QUANT: CPT

## 2025-05-21 PROCEDURE — 84295 ASSAY OF SERUM SODIUM: CPT

## 2025-05-21 PROCEDURE — 96374 THER/PROPH/DIAG INJ IV PUSH: CPT

## 2025-05-21 PROCEDURE — 86850 RBC ANTIBODY SCREEN: CPT | Performed by: STUDENT IN AN ORGANIZED HEALTH CARE EDUCATION/TRAINING PROGRAM

## 2025-05-21 PROCEDURE — 0241U HB NFCT DS VIR RESP RNA 4 TRGT: CPT

## 2025-05-21 PROCEDURE — 84484 ASSAY OF TROPONIN QUANT: CPT | Performed by: SURGERY

## 2025-05-21 PROCEDURE — 99214 OFFICE O/P EST MOD 30 MIN: CPT | Performed by: SURGERY

## 2025-05-21 PROCEDURE — 99223 1ST HOSP IP/OBS HIGH 75: CPT

## 2025-05-21 PROCEDURE — 74176 CT ABD & PELVIS W/O CONTRAST: CPT

## 2025-05-21 PROCEDURE — 85025 COMPLETE CBC W/AUTO DIFF WBC: CPT | Performed by: SURGERY

## 2025-05-21 PROCEDURE — 84132 ASSAY OF SERUM POTASSIUM: CPT

## 2025-05-21 RX ORDER — POTASSIUM CHLORIDE 750 MG/1
10 TABLET, EXTENDED RELEASE ORAL ONCE
Status: DISCONTINUED | OUTPATIENT
Start: 2025-05-22 | End: 2025-05-21

## 2025-05-21 RX ORDER — DEXTROSE, SODIUM CHLORIDE, SODIUM LACTATE, POTASSIUM CHLORIDE, AND CALCIUM CHLORIDE 5; .6; .31; .03; .02 G/100ML; G/100ML; G/100ML; G/100ML; G/100ML
50 INJECTION, SOLUTION INTRAVENOUS CONTINUOUS
Status: DISCONTINUED | OUTPATIENT
Start: 2025-05-21 | End: 2025-05-22

## 2025-05-21 RX ORDER — INSULIN GLARGINE 100 [IU]/ML
10 INJECTION, SOLUTION SUBCUTANEOUS
Status: DISCONTINUED | OUTPATIENT
Start: 2025-05-21 | End: 2025-05-22

## 2025-05-21 RX ORDER — MIDODRINE HYDROCHLORIDE 5 MG/1
5 TABLET ORAL
Status: DISCONTINUED | OUTPATIENT
Start: 2025-05-21 | End: 2025-05-28 | Stop reason: HOSPADM

## 2025-05-21 RX ORDER — HYDROCORTISONE 10 MG/1
10 TABLET ORAL ONCE
Status: COMPLETED | OUTPATIENT
Start: 2025-05-21 | End: 2025-05-21

## 2025-05-21 RX ORDER — POTASSIUM CHLORIDE 1500 MG/1
40 TABLET, EXTENDED RELEASE ORAL ONCE
Status: COMPLETED | OUTPATIENT
Start: 2025-05-21 | End: 2025-05-21

## 2025-05-21 RX ORDER — FLUDROCORTISONE ACETATE 0.1 MG/1
0.2 TABLET ORAL DAILY
Status: DISCONTINUED | OUTPATIENT
Start: 2025-05-21 | End: 2025-05-28 | Stop reason: HOSPADM

## 2025-05-21 RX ORDER — HEPARIN SODIUM 5000 [USP'U]/ML
5000 INJECTION, SOLUTION INTRAVENOUS; SUBCUTANEOUS EVERY 8 HOURS SCHEDULED
Status: DISCONTINUED | OUTPATIENT
Start: 2025-05-21 | End: 2025-05-21

## 2025-05-21 RX ORDER — HYDROCORTISONE 10 MG/1
10 TABLET ORAL 3 TIMES DAILY
Status: DISCONTINUED | OUTPATIENT
Start: 2025-05-21 | End: 2025-05-21

## 2025-05-21 RX ORDER — SERTRALINE HYDROCHLORIDE 100 MG/1
100 TABLET, FILM COATED ORAL DAILY
Status: DISCONTINUED | OUTPATIENT
Start: 2025-05-22 | End: 2025-05-28 | Stop reason: HOSPADM

## 2025-05-21 RX ORDER — HYDROCORTISONE 20 MG/1
20 TABLET ORAL
Status: DISCONTINUED | OUTPATIENT
Start: 2025-05-22 | End: 2025-05-22

## 2025-05-21 RX ORDER — POTASSIUM CHLORIDE 1500 MG/1
60 TABLET, EXTENDED RELEASE ORAL ONCE
Status: COMPLETED | OUTPATIENT
Start: 2025-05-21 | End: 2025-05-21

## 2025-05-21 RX ORDER — INSULIN LISPRO 100 [IU]/ML
1-5 INJECTION, SOLUTION INTRAVENOUS; SUBCUTANEOUS
Status: DISCONTINUED | OUTPATIENT
Start: 2025-05-21 | End: 2025-05-26

## 2025-05-21 RX ORDER — MAGNESIUM SULFATE HEPTAHYDRATE 40 MG/ML
2 INJECTION, SOLUTION INTRAVENOUS ONCE
Status: COMPLETED | OUTPATIENT
Start: 2025-05-21 | End: 2025-05-21

## 2025-05-21 RX ORDER — DEXTROSE MONOHYDRATE 25 G/50ML
INJECTION, SOLUTION INTRAVENOUS CODE/TRAUMA/SEDATION MEDICATION
Status: COMPLETED | OUTPATIENT
Start: 2025-05-21 | End: 2025-05-21

## 2025-05-21 RX ORDER — MAGNESIUM HYDROXIDE/ALUMINUM HYDROXICE/SIMETHICONE 120; 1200; 1200 MG/30ML; MG/30ML; MG/30ML
30 SUSPENSION ORAL EVERY 6 HOURS PRN
Status: DISCONTINUED | OUTPATIENT
Start: 2025-05-21 | End: 2025-05-28 | Stop reason: HOSPADM

## 2025-05-21 RX ORDER — INSULIN LISPRO 100 [IU]/ML
1-6 INJECTION, SOLUTION INTRAVENOUS; SUBCUTANEOUS
Status: DISCONTINUED | OUTPATIENT
Start: 2025-05-21 | End: 2025-05-26

## 2025-05-21 RX ORDER — SENNOSIDES 8.6 MG
1 TABLET ORAL DAILY
Status: DISCONTINUED | OUTPATIENT
Start: 2025-05-22 | End: 2025-05-28 | Stop reason: HOSPADM

## 2025-05-21 RX ORDER — ONDANSETRON 2 MG/ML
4 INJECTION INTRAMUSCULAR; INTRAVENOUS EVERY 6 HOURS PRN
Status: DISCONTINUED | OUTPATIENT
Start: 2025-05-21 | End: 2025-05-28

## 2025-05-21 RX ORDER — ATORVASTATIN CALCIUM 10 MG/1
10 TABLET, FILM COATED ORAL
Status: DISCONTINUED | OUTPATIENT
Start: 2025-05-21 | End: 2025-05-28 | Stop reason: HOSPADM

## 2025-05-21 RX ORDER — ASPIRIN 81 MG/1
81 TABLET, CHEWABLE ORAL DAILY
Status: DISCONTINUED | OUTPATIENT
Start: 2025-05-22 | End: 2025-05-28 | Stop reason: HOSPADM

## 2025-05-21 RX ORDER — HYDROCORTISONE 20 MG/1
20 TABLET ORAL EVERY MORNING
Status: DISCONTINUED | OUTPATIENT
Start: 2025-05-22 | End: 2025-05-21

## 2025-05-21 RX ORDER — CALCIUM GLUCONATE 20 MG/ML
2 INJECTION, SOLUTION INTRAVENOUS ONCE
Status: COMPLETED | OUTPATIENT
Start: 2025-05-21 | End: 2025-05-21

## 2025-05-21 RX ADMIN — MAGNESIUM SULFATE HEPTAHYDRATE 2 G: 40 INJECTION, SOLUTION INTRAVENOUS at 18:39

## 2025-05-21 RX ADMIN — DEXTROSE, SODIUM CHLORIDE, SODIUM LACTATE, POTASSIUM CHLORIDE, AND CALCIUM CHLORIDE 50 ML/HR: 5; .6; .31; .03; .02 INJECTION, SOLUTION INTRAVENOUS at 18:04

## 2025-05-21 RX ADMIN — ATORVASTATIN CALCIUM 10 MG: 10 TABLET, FILM COATED ORAL at 18:38

## 2025-05-21 RX ADMIN — MIDODRINE HYDROCHLORIDE 5 MG: 5 TABLET ORAL at 18:38

## 2025-05-21 RX ADMIN — FLUDROCORTISONE ACETATE 0.2 MG: 0.1 TABLET ORAL at 22:34

## 2025-05-21 RX ADMIN — POTASSIUM CHLORIDE 40 MEQ: 1500 TABLET, EXTENDED RELEASE ORAL at 18:39

## 2025-05-21 RX ADMIN — CALCIUM GLUCONATE 2 G: 20 INJECTION, SOLUTION INTRAVENOUS at 19:43

## 2025-05-21 RX ADMIN — POTASSIUM CHLORIDE 60 MEQ: 1500 TABLET, EXTENDED RELEASE ORAL at 17:53

## 2025-05-21 RX ADMIN — DEXTROSE MONOHYDRATE 50 ML: 25 INJECTION, SOLUTION INTRAVENOUS at 15:16

## 2025-05-21 RX ADMIN — APIXABAN 5 MG: 5 TABLET, FILM COATED ORAL at 18:38

## 2025-05-21 RX ADMIN — HYDROCORTISONE 10 MG: 10 TABLET ORAL at 22:34

## 2025-05-21 NOTE — ASSESSMENT & PLAN NOTE
[id number]  Not on any AV nabeel blockading agents as an outpatient or rhythm control agents per chart review and directly asking patient    Plan:  Currently rate controlled without need for AV nabeel blockade  Avoid beta-blockers that may mask hypoglycemia  Continue/resume home Eliquis 5 mg twice daily

## 2025-05-21 NOTE — ASSESSMENT & PLAN NOTE
Noted on admission with leukocytosis of 13K.  On initial exam, he did not appear to have tachypnea, but was later documented to have a respiratory rate of 22 and therefore elevated.    Suspect that patient's tachypnea is related to his bilateral pleural effusions, which are not currently causing hypoxia.  He has had a cough for 2 days, wet, nonproductive, which at this point is more suggestive of possible CHF exacerbation rather than an infectious etiology.  However, COVID/flu/RSV has been sent and is pending.    Suspect leukocytosis is likely chronic in the setting of hydrocortisone use for adrenal insufficiency.  He does not have any elevated neutrophils or bandemia to suggest bacterial infection that would warrant initiation of antibiotics.    Chest x-ray negative for any pneumonia/consolidation, shows some pulmonary congestion on my wet read.    Plan:  Follow-up COVID/flu/RSV  Check lactate  Check Pro-Mt  Note that both lactate and Pro-Mt may be confounded by patient's CKD stage V  Monitor off of antibiotics for now as patient does not have any tachycardia or fevers to suggest true infection  Hold off on drawing blood cultures unless patient spikes fever

## 2025-05-21 NOTE — ASSESSMENT & PLAN NOTE
At home, on 15 mg in AM, 10 mg in afternoon of cortef      Plan:  Will increase doses of cortef to 30 mg and 20 mg in AM/afternoon respectively for stress dosing x 3 days ending 5/24 evening  Continue home florinef 0.2 mg qAM  Checking BNP to rule out heart failure as possible stress etiology regarding hypoglycemia  Endo consult in AM

## 2025-05-21 NOTE — ASSESSMENT & PLAN NOTE
Wt Readings from Last 3 Encounters:   05/21/25 86.1 kg (189 lb 13.1 oz)     Prior to admission on 20 mg lasix daily  Not on any AV nabeel blockading agents  Due to orthostatic hypotension, not on ACE/ARB/Arni.  Not on SGLT2 inhibitor due to hypotension risk    Last echo in December 2023 showed LVEF 60% with normal RV function, normal diastolic LV function, no significant valvular disease.  Trace AR noted, tricuspid.     Dry weight: unknown    Home meds/GDMT include: Lasix 20 mg p.o. daily  Ischemic workup: unknown, but is very high risk of CAD due to DM1 history   Smoking hx: No active tobacco use  EtOH use: Denies    On admission, patient has 1+ lower extremity swelling bilaterally and wet cough x 48h PTA, but he has not noticed any significant weight changes.  He was, however, noted to have bilateral pleural effusions.  This raises suspicion for CHF exacerbation therefore we will check BNP and echo    Plan:  Follow-up BNP, echo to evaluate volume status/if patient is in acute exacerbation and will need very close diuresis given his orthostatic hypotension.    2g sodium restriction. Fluid restriction on hold while r/o orthostatic hypotnesion  Daily standing weights  I/O  Hold lasix while ongoing workup for syncope and while checking orthostatics

## 2025-05-21 NOTE — ASSESSMENT & PLAN NOTE
History noted    Recent Labs     05/21/25  1511 05/21/25  1521   HGB 10.2* 9.3*     Plan:  Hold oral home supplementation to avoid constipation in the setting of recent syncope to avoid vasovagal trigger  Maintain bowel regimen  Transfuse to goal of greater than 7.0 hemoglobin

## 2025-05-21 NOTE — ASSESSMENT & PLAN NOTE
Suspect due to true hypoglycemia that was confirmed to be level of 40 on BMP on admission  Suspect that hypoglycemia was secondary to adrenal insufficiency, which may have been provoked by recent URI.  Patient's only presenting complaint is a wet cough for the past 2 days, and has no other symptoms whatsoever such as fevers, chills, sore throat, or any sick contacts.  Will also check BNP given history of HFpEF to rule out heart failure given bilateral pleural effusions    Plan:  Additional 10 mg cortef now  Start low rate 50 mL/hr D5LR to prevent overnight hypoglycemia  Double home Cortef from 15/10 mg in AM/afternoon to 30/20 mg in AM/afternoon respectively for stress dosing  Encourage p.o. intake  No fluid restriction for now given he also has orthostatic hypotension  Hold home Lasix  Continue home midodrine 5 mg 3 times daily  Check orthostatic vitals  Follow-up echo to check for acute CHF as well as any aortic valve changes

## 2025-05-21 NOTE — ASSESSMENT & PLAN NOTE
Noted on admission CT abdomen pelvis as part of trauma workup    Also with known osteoporosis.  Follows with endocrine in the outpatient setting with Margarita Callahan.  Status post left ORIF for osteoporotic fracture from standing height at home in February 2025.    Not on PPI outpatient    Plan:  Vitamin D, calcium supplementation in the setting of known vitamin D deficiency  Endocrine consult

## 2025-05-21 NOTE — ASSESSMENT & PLAN NOTE
Lab Results   Component Value Date    EGFR 68 05/21/2025    CREATININE 1.10 05/21/2025   History noted  Per lab review, baseline appears to be 0.9-1.1    Not in BERNA on admission    Plan:  Avoid nephrotoxins

## 2025-05-21 NOTE — ASSESSMENT & PLAN NOTE
Noted on admission with corrected level of 7.5.  Likely in the setting of CKD 5, as well as vitamin D deficiency.    Plan:  Check phos level  Will supplement with IV calcium gluconate 2 g x 1  Continue vitamin D oral supplementation  Defer PTH check to endocrine if clinically warranted

## 2025-05-21 NOTE — ASSESSMENT & PLAN NOTE
70 year old male s/p unwitnessed syncopal fall. No acute traumatic injury noted on workup.    Plan:  Appreciate medicine admission for syncope workup  AM labs

## 2025-05-21 NOTE — ASSESSMENT & PLAN NOTE
Patient denies taking any additional insulin doses though he does admit he sometimes makes dose adjustments based off of his p.o. intake as he is a type I diabetic.     Admission potassium of 2.8.  Treated with oral repletion as well as IV magnesium 2 g x 1    Plan:  Check a.m. BMP  Monitor off of additional telemetry unless potassium drops below 2.5

## 2025-05-21 NOTE — H&P
H&P - Hospitalist   Name: Karson You 70 y.o. male I MRN: [id number]  Unit/Bed#: ED 06 I Date of Admission: 5/21/2025   Date of Service: 5/21/2025 I Hospital Day: 0     Assessment & Plan  Syncope and collapse  Suspect due to true hypoglycemia that was confirmed to be level of 40 on BMP on admission  Suspect that hypoglycemia was secondary to adrenal insufficiency, which may have been provoked by recent URI.  Patient's only presenting complaint is a wet cough for the past 2 days, and has no other symptoms whatsoever such as fevers, chills, sore throat, or any sick contacts.  Will also check BNP given history of HFpEF to rule out heart failure given bilateral pleural effusions    Plan:  Additional 10 mg cortef now  Start low rate 50 mL/hr D5LR to prevent overnight hypoglycemia  Double home Cortef from 15/10 mg in AM/afternoon to 30/20 mg in AM/afternoon respectively for stress dosing  Encourage p.o. intake  No fluid restriction for now given he also has orthostatic hypotension  Hold home Lasix  Continue home midodrine 5 mg 3 times daily  Check orthostatic vitals  Follow-up echo to check for acute CHF as well as any aortic valve changes  Paroxysmal A-fib (HCC)  [id number]=0  Not on any AV nabeel blockading agents as an outpatient or rhythm control agents per chart review and directly asking patient    Plan:  Currently rate controlled without need for AV nabeel blockade  Avoid beta-blockers that may mask hypoglycemia  Continue/resume home Eliquis 5 mg twice daily  Adrenal insufficiency (HCC)  At home, on 15 mg in AM, 10 mg in afternoon of cortef      Plan:  Will increase doses of cortef to 30 mg and 20 mg in AM/afternoon respectively for stress dosing x 3 days ending 5/24 evening  Continue home florinef 0.2 mg qAM  Checking BNP to rule out heart failure as possible stress etiology regarding hypoglycemia  Endo consult in AM  Hypoglycemia  Hypoglycemia noted to be 40 mg/dL glucose lv on admission     See workup under  syncope and adrenal insufficiency    Plan:  Continue D5 LR at 50 mL/h x 14 hours, particularly overnight to ensure euglycemia  Hypoglycemia protocol  Holding home Lantus 14 units nightly  Orthostatic hypotension  Blood Pressure: 152/66    Plan:  Continue home midodrine 5 mg 3 times daily with hold parameters for greater than 130  Hold home Lasix given syncope  History of cerebrovascular accident (CVA) in adulthood  Continue ASA and statin  Iron deficiency anemia  History noted    Recent Labs     05/21/25  1511 05/21/25  1521   HGB 10.2* 9.3*     Plan:  Hold oral home supplementation to avoid constipation in the setting of recent syncope to avoid vasovagal trigger  Maintain bowel regimen  Transfuse to goal of greater than 7.0 hemoglobin    CKD (chronic kidney disease) stage 5, GFR less than 15 ml/min ([facility])  Lab Results   Component Value Date    EGFR 68 05/21/2025    CREATININE 1.10 05/21/2025   History noted  Per lab review, baseline appears to be 0.9-1.1    Not in BERNA on admission    Plan:  Avoid nephrotoxins  SIRS (systemic inflammatory response syndrome) ([facility])  Noted on admission with leukocytosis of 13K.  On initial exam, he did not appear to have tachypnea, but was later documented to have a respiratory rate of 22 and therefore elevated.    Suspect that patient's tachypnea is related to his bilateral pleural effusions, which are not currently causing hypoxia.  He has had a cough for 2 days, wet, nonproductive, which at this point is more suggestive of possible CHF exacerbation rather than an infectious etiology.  However, COVID/flu/RSV has been sent and is pending.    Suspect leukocytosis is likely chronic in the setting of hydrocortisone use for adrenal insufficiency.  He does not have any elevated neutrophils or bandemia to suggest bacterial infection that would warrant initiation of antibiotics.    Chest x-ray negative for any pneumonia/consolidation, shows some pulmonary congestion on my wet  read.    Plan:  Follow-up COVID/flu/RSV  Check lactate  Check Pro-Mt  Note that both lactate and Pro-Mt may be confounded by patient's CKD stage V  Monitor off of antibiotics for now as patient does not have any tachycardia or fevers to suggest true infection  Hold off on drawing blood cultures unless patient spikes fever    Chronic heart failure with preserved ejection fraction (HCC)  Wt Readings from Last 3 Encounters:   05/21/25 86.1 kg (189 lb 13.1 oz)     Prior to admission on 20 mg lasix daily  Not on any AV nabeel blockading agents  Due to orthostatic hypotension, not on ACE/ARB/Arni.  Not on SGLT2 inhibitor due to hypotension risk    Last echo in December 2023 showed LVEF 60% with normal RV function, normal diastolic LV function, no significant valvular disease.  Trace AR noted, tricuspid.     Dry weight: unknown    Home meds/GDMT include: Lasix 20 mg p.o. daily  Ischemic workup: unknown, but is very high risk of CAD due to DM1 history   Smoking hx: No active tobacco use  EtOH use: Denies    On admission, patient has 1+ lower extremity swelling bilaterally and wet cough x 48h PTA, but he has not noticed any significant weight changes.  He was, however, noted to have bilateral pleural effusions.  This raises suspicion for CHF exacerbation therefore we will check BNP and echo    Plan:  Follow-up BNP, echo to evaluate volume status/if patient is in acute exacerbation and will need very close diuresis given his orthostatic hypotension.    2g sodium restriction. Fluid restriction on hold while r/o orthostatic hypotnesion  Daily standing weights  I/O  Hold lasix while ongoing workup for syncope and while checking orthostatics      PAD (peripheral artery disease) ([facility])  Takes daily aspirin 81 mg p.o. daily as an outpatient.  Patient denies any history of known coronary artery disease.    Plan:  Continue aspirin 81 mg p.o. daily, atorvastatin 10 mg p.o. daily  DISH (diffuse idiopathic skeletal hyperostosis)  Noted  on admission CT abdomen pelvis as part of trauma workup    Also with known osteoporosis.  Follows with endocrine in the outpatient setting with Margarita Callahan.  Status post left ORIF for osteoporotic fracture from standing height at home in February 2025.    Not on PPI outpatient    Plan:  Vitamin D, calcium supplementation in the setting of known vitamin D deficiency  Endocrine consult  Hypocalcemia  Noted on admission with corrected level of 7.5.  Likely in the setting of CKD 5, as well as vitamin D deficiency.    Plan:  Check phos level  Will supplement with IV calcium gluconate 2 g x 1  Continue vitamin D oral supplementation  Defer PTH check to endocrine if clinically warranted  Hypokalemia  Patient denies taking any additional insulin doses though he does admit he sometimes makes dose adjustments based off of his p.o. intake as he is a type I diabetic.     Admission potassium of 2.8.  Treated with oral repletion as well as IV magnesium 2 g x 1    Plan:  Check a.m. BMP  Monitor off of additional telemetry unless potassium drops below 2.5      VTE Pharmacologic Prophylaxis:   Moderate Risk (Score 3-4) - Pharmacological DVT Prophylaxis Ordered: apixaban (Eliquis).  Code Status: Level 1 - Full Code confirmed with patient  Discussion with family: Patient declined call to .     Anticipated Length of Stay: Patient will be admitted on an observation basis with an anticipated length of stay of less than 2 midnights secondary to syncope workup, suspected to be related to adrenal insufficiency in the setting of possible URI/pneumonia.    History of Present Illness   Chief Complaint: Sole You is a 70 y.o. male with a PMH of type 1 diabetes, adrenal insufficiency, paroxysmal A-fib on Eliquis, who presents with syncope on morning of admission 5/21.    Full past medical history: DM1, adrenal insufficiency, HFpEF, CKD 5 orthostatic hypotension, iron deficiency anemia, prolonged QT, history of CVA, PAD  on aspirin, morbid obesity, vitamin D deficiency, osteoporosis s/p L ORIF in 2/2025 after fall from standing height at home, prior syncope in December 2023 suspected to be due to orthostatic hypotension at the time.    History obtained from patient although he is a limited historian.  There is no one else at the bedside to provide history.  Patient's girlfriend called EMS after she supposedly witnessed patient fall while ambulating.  He is not sure if he had head strike or not.  He states that his girlfriend told him he was unconscious for several minutes.  He does not remember much since he initially lost consciousness this morning.    He denies fevers, chills, recent illness, chest pain, palpitations, lightheadedness, dizziness, shortness of breath, nausea, vomiting, changes in appetite, weight changes, numbness, weakness.  He has noted to be coughing in the room.  He states that he has had a wet cough for about 2 days, but denies any recent travel or sick contacts.  He has not noticed any productive phlegm.  He states that he has taken all of his medications, including endocrine and cardiology meds, as prescribed without any missed doses or dose adjustments.  This includes his Cortef, Florinef, and daily 20 mg p.o. daily Lasix.  He has not noticed himself feeling particularly worse than usual to warrant increasing his Florinef or Cortef for stress dosing.    ED course:  BMP on admission showed glucose of 40.  He was also noted to have low calcium corrected at 7.5 and potassium of 2.8.  This was treated with an ampule of D50.  CBC showed leukocytosis of 13.9, hemoglobin 9.3, platelets 186.  I-STAT showed pH 7.51/39/29/32 suggestive of metabolic alkalosis.    Patient underwent trauma workup.  CK was unremarkable at 133 ruling out rhabdomyolysis.  CT head Noncon was negative.  CT C-spine unremarkable.  CT chest abdomen pelvis Noncon showed bilateral pleural effusions, with no acute intrathoracic or intra-abdominal  injury.    Per my wet read of the x-ray, patient appears to have some mild pulmonary congestion and increased bowel gas pattern without obstruction.    Admit to [facility] for syncope workup. Suspect due to hypoglycemia from adrenal insufficiency in the setting of possible viral URI versus CHF exacerbation given pleural effusions on imaging requiring stress dosing of glucocorticoids and transthoracic echocardiogram for further cardiac workup.  Less likely, although still possible, orthostatic hypotension which is unknown prior condition and has led to prior hospitalizations with similar presentation in the past.    Review of Systems   Constitutional:  Negative for chills, fatigue and fever.   Respiratory:  Negative for cough and shortness of breath.    Cardiovascular:  Negative for chest pain and palpitations.   Gastrointestinal:  Negative for abdominal pain, constipation, diarrhea, nausea and vomiting.   Skin:  Negative for rash.   Neurological:  Positive for syncope (x several minutes this AM). Negative for dizziness, weakness, light-headedness, numbness and headaches.   Psychiatric/Behavioral:  Negative for sleep disturbance.        Historical Information   Past Medical History[1]  Past Surgical History[2]  Social History[3]  No existing history information found.  No existing history information found.  Family history non-contributory  Social History:  Marital Status: Single   Occupation: n/a  Patient Pre-hospital Living Situation: Home lives at home with girlfriend  Patient Pre-hospital Level of Mobility: walks  Patient Pre-hospital Diet Restrictions: Diabetic diet    Meds/Allergies   I have reviewed home medications with patient personally.  Prior to Admission medications    Not on File     Not on File    Objective :  Temp:  [99.2 °F (37.3 °C)] 99.2 °F (37.3 °C)  HR:  [65-80] 66  BP: (101-152)/(53-66) 152/66  Resp:  [17-23] 23  SpO2:  [92 %-97 %] 97 %  O2 Device: None (Room air)    Physical Exam  Vitals reviewed.    Constitutional:       General: He is not in acute distress.     Comments: Appears younger than stated age   HENT:      Head: Normocephalic.      Mouth/Throat:      Mouth: Mucous membranes are moist.      Pharynx: Oropharynx is clear.     Eyes:      General: No scleral icterus.     Extraocular Movements: Extraocular movements intact.       Cardiovascular:      Heart sounds: No murmur heard.     No friction rub. No gallop.      Comments: Irregularly irregular.  Telemetry showing A-fib in the 60s  Pulmonary:      Effort: Pulmonary effort is normal. No respiratory distress.      Breath sounds: No stridor. Rhonchi present. No wheezing.      Comments: Diminished breath sounds in bases bilaterally  Abdominal:      General: There is no distension.      Palpations: There is no mass.      Tenderness: There is no abdominal tenderness. There is no guarding.     Musculoskeletal:         General: Normal range of motion.     Skin:     General: Skin is warm and dry.      Findings: No rash.     Neurological:      Mental Status: He is alert and oriented to person, place, and time.     Psychiatric:         Mood and Affect: Mood normal.         Behavior: Behavior normal.         Thought Content: Thought content normal.      Comments: Flat mood and affect.          Lines/Drains:            Lab Results: I have reviewed the following results:  Results from last 7 days   Lab Units 05/21/25  1521   WBC Thousand/uL 13.92*   HEMOGLOBIN g/dL 9.3*   HEMATOCRIT % 29.6*   PLATELETS Thousands/uL 186   SEGS PCT % 75   LYMPHO PCT % 9*   MONO PCT % 14*   EOS PCT % 1     Results from last 7 days   Lab Units 05/21/25  1521   SODIUM mmol/L 138   POTASSIUM mmol/L 2.8*   CHLORIDE mmol/L 101   CO2 mmol/L 28   BUN mg/dL 11   CREATININE mg/dL 1.10   ANION GAP mmol/L 9   CALCIUM mg/dL 7.0*   ALBUMIN g/dL 3.4*   TOTAL BILIRUBIN mg/dL 0.91   ALK PHOS U/L 136*   ALT U/L 13   AST U/L 34   GLUCOSE RANDOM mg/dL 40*     Results from last 7 days   Lab Units  "05/21/25  1521   INR  1.63*     Results from last 7 days   Lab Units 05/21/25  1756 05/21/25  1638 05/21/25  1543   POC GLUCOSE mg/dl 68 92 92     No results found for: \"HGBA1C\"        Imaging Results Review: I reviewed radiology reports from this admission including: chest xray, CT chest, CT abdomen/pelvis, CT head, and CT C-spine.  Other Study Results Review: EKG was reviewed.     Administrative Statements   I have spent a total time of 60 minutes in caring for this patient on the day of the visit/encounter including Diagnostic results, Prognosis, Risks and benefits of tx options, Instructions for management, Patient and family education, Importance of tx compliance, Risk factor reductions, Impressions, Counseling / Coordination of care, Documenting in the medical record, Reviewing/placing orders in the medical record (including tests, medications, and/or procedures), Obtaining or reviewing history  , and Communicating with other healthcare professionals .    ** Please Note: This note has been constructed using a voice recognition system. **         [1] No past medical history on file.  [2] No past surgical history on file.  [3]      "

## 2025-05-21 NOTE — ASSESSMENT & PLAN NOTE
Blood Pressure: 152/66    Plan:  Continue home midodrine 5 mg 3 times daily with hold parameters for greater than 130  Hold home Lasix given syncope

## 2025-05-21 NOTE — H&P
H&P - Trauma   Name: Karson You 70 y.o. male I MRN: [id number]  Unit/Bed#: ED 06 I Date of Admission: 5/21/2025   Date of Service: 5/21/2025 I Hospital Day: 0     Assessment & Plan  Syncope and collapse  70 year old male s/p unwitnessed syncopal fall. No acute traumatic injury noted on workup.    Plan:  Appreciate medicine admission for syncope workup  AM labs     Trauma Alert: Level B   Model of Arrival: Ambulance    Trauma Team: Attending Lucina, Resident   Consultants:     None     History of Present Illness   Chief Complaint: fall  Mechanism:Fall     Karson You is a 70 y.o. male PMHx of stroke on ASA who presents as a trauma level B s/p syncopal fall. Positive headstrike, positive LOC. Denies any pain at present.     Review of Systems  Medical History Review: I have reviewed the patient's PMH, PSH, Social History, Family History, Meds, and Allergies     There is no immunization history on file for this patient.  Last Tetanus: unknown      ISAR Score: Did you order a geriatric consult if the score was 2 or greater?: yes (ISAR) Identification of Seniors at Risk  Before the illness or injury that brought you to the Emergency, did you need someone to help you on a regular basis?: 0  In the last 24 hours, have you needed more help than usual?: 1  Have you been hospitalized for one or more nights during the past 6 months?: 0  In general, do you see well?: 0  In general, do you have serious problems with your memory?: 0  Do you take more than three different medications every day?: 1  ISAR Score: 2         Objective :  Temp:  [99.2 °F (37.3 °C)] 99.2 °F (37.3 °C)  HR:  [65-80] 65  BP: (101-131)/(53-62) 131/62  Resp:  [17-20] 19  SpO2:  [92 %-96 %] 92 %  O2 Device: None (Room air)    Initial Vitals:   Temperature: 99.2 °F (37.3 °C) (05/21/25 1456)  Pulse: 73 (05/21/25 1456)  Respirations: 18 (05/21/25 1456)  Blood Pressure: 112/54 (05/21/25 1456)    Primary Survey:   Airway:        Status: patent;         Pre-hospital Interventions: none        Hospital Interventions: none  Breathing:        Pre-hospital Interventions: none       Effort: normal       Right breath sounds: normal       Left breath sounds: normal  Circulation:        Rhythm: regular       Rate: regular   Right Pulses Left Pulses    R radial: 2+  R femoral: 2+  R pedal: 2+  R carotid: 2+   L radial: 2+  L femoral: 2+  L pedal: 2+  L carotid: 2+     Disability:        GCS: Eye: 4; Verbal: 5 Motor: 6 Total: 15       Right Pupil: round;  reactive         Left Pupil:  round;  reactive      R Motor Strength L Motor Strength    R : 5/5  R dorsiflex: 5/5  R plantarflex: 5/5 L : 5/5  L dorsiflex: 5/5  L plantarflex: 5/5          Exposure:           Secondary Survey:  Physical Exam  Vitals reviewed.   Constitutional:       Appearance: Normal appearance.   HENT:      Head: Normocephalic.     Eyes:      Extraocular Movements: Extraocular movements intact.      Pupils: Pupils are equal, round, and reactive to light.       Cardiovascular:      Rate and Rhythm: Normal rate and regular rhythm.      Pulses: Normal pulses.   Pulmonary:      Effort: Pulmonary effort is normal. No respiratory distress.      Breath sounds: Normal breath sounds.   Abdominal:      General: There is no distension.      Palpations: Abdomen is soft.      Tenderness: There is no abdominal tenderness.     Musculoskeletal:         General: No tenderness.      Cervical back: No tenderness.     Neurological:      General: No focal deficit present.      Mental Status: He is alert and oriented to person, place, and time. Mental status is at baseline.             Lab Results: I have reviewed the following results:  Recent Labs     05/21/25  1511 05/21/25  1521 05/21/25  1548   WBC  --  13.92*  --    HGB 10.2* 9.3*  --    HCT 30* 29.6*  --    PLT  --  186  --    SODIUM  --  138  --    K  --  2.8*  --    CL  --  101  --    CO2 33* 28  --    BUN  --  11  --    CREATININE  --  1.10  --    GLUC  --   40*  --    CAIONIZED 0.85*  --   --    AST  --  34  --    ALT  --  13  --    ALB  --  3.4*  --    TBILI  --  0.91  --    ALKPHOS  --  136*  --    PTT  --  37*  --    INR  --  1.63*  --    HSTNI0  --   --  93*       Imaging Results: I have personally reviewed pertinent images saved in PACS. CT scan findings (and other pertinent positive findings on images) were discussed with radiology. My interpretation of the images/reports are as follows:  Chest Xray(s): negative for acute findings   FAST exam(s): negative for acute findings   CT Scan(s): positive for acute findings: Bilateral pleural effusions   Additional Xray(s): N/A     Other Studies: Other Study Results Review: No additional pertinent studies reviewed.

## 2025-05-21 NOTE — ASSESSMENT & PLAN NOTE
Hypoglycemia noted to be 40 mg/dL glucose lv on admission     See workup under syncope and adrenal insufficiency    Plan:  Continue D5 LR at 50 mL/h x 14 hours, particularly overnight to ensure euglycemia  Hypoglycemia protocol  Holding home Lantus 14 units nightly

## 2025-05-21 NOTE — ASSESSMENT & PLAN NOTE
Takes daily aspirin 81 mg p.o. daily as an outpatient.  Patient denies any history of known coronary artery disease.    Plan:  Continue aspirin 81 mg p.o. daily, atorvastatin 10 mg p.o. daily

## 2025-05-21 NOTE — QUICK NOTE
Cervical Collar Clearance:    The patient had a CT scan of the cervical spine demonstrating no acute injury. On exam, the patient had no midline point tenderness or paresthesias/numbness/weakness in the extremities. The patient had full range of motion (was then able to flex, extend, and rotate head laterally) without pain. There were no distracting injuries and the patient was not intoxicated.      The patient's cervical spine was cleared radiologically and clinically. Cervical collar removed at this time.     Bettina De Oliveira MD  5/21/2025 5:35 PM

## 2025-05-22 ENCOUNTER — APPOINTMENT (OUTPATIENT)
Dept: NON INVASIVE DIAGNOSTICS | Facility: HOSPITAL | Age: 70
DRG: 638 | End: 2025-05-22
Payer: COMMERCIAL

## 2025-05-22 PROBLEM — E10.9 TYPE 1 DIABETES MELLITUS (HCC): Status: ACTIVE | Noted: 2025-05-22

## 2025-05-22 PROBLEM — E83.42 HYPOMAGNESEMIA: Status: ACTIVE | Noted: 2025-05-22

## 2025-05-22 LAB
ANION GAP SERPL CALCULATED.3IONS-SCNC: 10 MMOL/L (ref 4–13)
ANION GAP SERPL CALCULATED.3IONS-SCNC: 10 MMOL/L (ref 4–13)
AORTIC ROOT: 3.1 CM
AORTIC ROOT: 3.1 CM
ASCENDING AORTA: 3.8 CM
ASCENDING AORTA: 3.8 CM
BASOPHILS # BLD AUTO: 0.02 THOUSANDS/ÂΜL (ref 0–0.1)
BASOPHILS # BLD AUTO: 0.02 THOUSANDS/ÂΜL (ref 0–0.1)
BASOPHILS NFR BLD AUTO: 0 % (ref 0–1)
BASOPHILS NFR BLD AUTO: 0 % (ref 0–1)
BNP SERPL-MCNC: 391 PG/ML (ref 0–100)
BNP SERPL-MCNC: 391 PG/ML (ref 0–100)
BUN SERPL-MCNC: 10 MG/DL (ref 5–25)
BUN SERPL-MCNC: 10 MG/DL (ref 5–25)
CALCIUM SERPL-MCNC: 7.7 MG/DL (ref 8.4–10.2)
CALCIUM SERPL-MCNC: 7.7 MG/DL (ref 8.4–10.2)
CHLORIDE SERPL-SCNC: 105 MMOL/L (ref 96–108)
CHLORIDE SERPL-SCNC: 105 MMOL/L (ref 96–108)
CO2 SERPL-SCNC: 28 MMOL/L (ref 21–32)
CO2 SERPL-SCNC: 28 MMOL/L (ref 21–32)
CREAT SERPL-MCNC: 0.87 MG/DL (ref 0.6–1.3)
CREAT SERPL-MCNC: 0.87 MG/DL (ref 0.6–1.3)
E WAVE DECELERATION TIME: 192 MS
E WAVE DECELERATION TIME: 192 MS
E/A RATIO: 1.91
E/A RATIO: 1.91
EOSINOPHIL # BLD AUTO: 0.16 THOUSAND/ÂΜL (ref 0–0.61)
EOSINOPHIL # BLD AUTO: 0.16 THOUSAND/ÂΜL (ref 0–0.61)
EOSINOPHIL NFR BLD AUTO: 2 % (ref 0–6)
EOSINOPHIL NFR BLD AUTO: 2 % (ref 0–6)
ERYTHROCYTE [DISTWIDTH] IN BLOOD BY AUTOMATED COUNT: 15.5 % (ref 11.6–15.1)
ERYTHROCYTE [DISTWIDTH] IN BLOOD BY AUTOMATED COUNT: 15.5 % (ref 11.6–15.1)
EST. AVERAGE GLUCOSE BLD GHB EST-MCNC: 151 MG/DL
EST. AVERAGE GLUCOSE BLD GHB EST-MCNC: 151 MG/DL
FRACTIONAL SHORTENING: 27 (ref 28–44)
FRACTIONAL SHORTENING: 27 (ref 28–44)
GFR SERPL CREATININE-BSD FRML MDRD: 88 ML/MIN/1.73SQ M
GFR SERPL CREATININE-BSD FRML MDRD: 88 ML/MIN/1.73SQ M
GLUCOSE SERPL-MCNC: 111 MG/DL (ref 65–140)
GLUCOSE SERPL-MCNC: 111 MG/DL (ref 65–140)
GLUCOSE SERPL-MCNC: 153 MG/DL (ref 65–140)
GLUCOSE SERPL-MCNC: 153 MG/DL (ref 65–140)
GLUCOSE SERPL-MCNC: 167 MG/DL (ref 65–140)
GLUCOSE SERPL-MCNC: 167 MG/DL (ref 65–140)
GLUCOSE SERPL-MCNC: 201 MG/DL (ref 65–140)
GLUCOSE SERPL-MCNC: 201 MG/DL (ref 65–140)
GLUCOSE SERPL-MCNC: 281 MG/DL (ref 65–140)
GLUCOSE SERPL-MCNC: 281 MG/DL (ref 65–140)
GLUCOSE SERPL-MCNC: 68 MG/DL (ref 65–140)
GLUCOSE SERPL-MCNC: 68 MG/DL (ref 65–140)
GLUCOSE SERPL-MCNC: 78 MG/DL (ref 65–140)
GLUCOSE SERPL-MCNC: 78 MG/DL (ref 65–140)
HBA1C MFR BLD: 6.9 %
HBA1C MFR BLD: 6.9 %
HCT VFR BLD AUTO: 33.5 % (ref 36.5–49.3)
HCT VFR BLD AUTO: 33.5 % (ref 36.5–49.3)
HGB BLD-MCNC: 10.5 G/DL (ref 12–17)
HGB BLD-MCNC: 10.5 G/DL (ref 12–17)
IMM GRANULOCYTES # BLD AUTO: 0.06 THOUSAND/UL (ref 0–0.2)
IMM GRANULOCYTES # BLD AUTO: 0.06 THOUSAND/UL (ref 0–0.2)
IMM GRANULOCYTES NFR BLD AUTO: 1 % (ref 0–2)
IMM GRANULOCYTES NFR BLD AUTO: 1 % (ref 0–2)
INTERVENTRICULAR SEPTUM IN DIASTOLE (PARASTERNAL SHORT AXIS VIEW): 1.2 CM
INTERVENTRICULAR SEPTUM IN DIASTOLE (PARASTERNAL SHORT AXIS VIEW): 1.2 CM
INTERVENTRICULAR SEPTUM: 1.2 CM (ref 0.6–1.1)
INTERVENTRICULAR SEPTUM: 1.2 CM (ref 0.6–1.1)
LAAS-AP2: 23.1 CM2
LAAS-AP2: 23.1 CM2
LAAS-AP4: 21.1 CM2
LAAS-AP4: 21.1 CM2
LEFT ATRIUM SIZE: 4.3 CM
LEFT ATRIUM SIZE: 4.3 CM
LEFT ATRIUM VOLUME (MOD BIPLANE): 66 ML
LEFT ATRIUM VOLUME (MOD BIPLANE): 66 ML
LEFT INTERNAL DIMENSION IN SYSTOLE: 3.3 CM (ref 2.1–4)
LEFT INTERNAL DIMENSION IN SYSTOLE: 3.3 CM (ref 2.1–4)
LEFT VENTRICULAR INTERNAL DIMENSION IN DIASTOLE: 4.5 CM (ref 3.5–6)
LEFT VENTRICULAR INTERNAL DIMENSION IN DIASTOLE: 4.5 CM (ref 3.5–6)
LEFT VENTRICULAR POSTERIOR WALL IN END DIASTOLE: 1.2 CM
LEFT VENTRICULAR POSTERIOR WALL IN END DIASTOLE: 1.2 CM
LEFT VENTRICULAR STROKE VOLUME: 50 ML
LEFT VENTRICULAR STROKE VOLUME: 50 ML
LV EF US.2D.A4C+ESTIMATED: 68 %
LV EF US.2D.A4C+ESTIMATED: 68 %
LVSV (TEICH): 50 ML
LVSV (TEICH): 50 ML
LYMPHOCYTES # BLD AUTO: 1.25 THOUSANDS/ÂΜL (ref 0.6–4.47)
LYMPHOCYTES # BLD AUTO: 1.25 THOUSANDS/ÂΜL (ref 0.6–4.47)
LYMPHOCYTES NFR BLD AUTO: 12 % (ref 14–44)
LYMPHOCYTES NFR BLD AUTO: 12 % (ref 14–44)
MAGNESIUM SERPL-MCNC: 1.7 MG/DL (ref 1.9–2.7)
MAGNESIUM SERPL-MCNC: 1.7 MG/DL (ref 1.9–2.7)
MCH RBC QN AUTO: 27.6 PG (ref 26.8–34.3)
MCH RBC QN AUTO: 27.6 PG (ref 26.8–34.3)
MCHC RBC AUTO-ENTMCNC: 31.3 G/DL (ref 31.4–37.4)
MCHC RBC AUTO-ENTMCNC: 31.3 G/DL (ref 31.4–37.4)
MCV RBC AUTO: 88 FL (ref 82–98)
MCV RBC AUTO: 88 FL (ref 82–98)
MONOCYTES # BLD AUTO: 1.11 THOUSAND/ÂΜL (ref 0.17–1.22)
MONOCYTES # BLD AUTO: 1.11 THOUSAND/ÂΜL (ref 0.17–1.22)
MONOCYTES NFR BLD AUTO: 11 % (ref 4–12)
MONOCYTES NFR BLD AUTO: 11 % (ref 4–12)
MV E'TISSUE VEL-LAT: 7 CM/S
MV E'TISSUE VEL-LAT: 7 CM/S
MV E'TISSUE VEL-SEP: 7 CM/S
MV E'TISSUE VEL-SEP: 7 CM/S
MV PEAK A VEL: 0.67 M/S
MV PEAK A VEL: 0.67 M/S
MV PEAK E VEL: 128 CM/S
MV PEAK E VEL: 128 CM/S
MV STENOSIS PRESSURE HALF TIME: 56 MS
MV STENOSIS PRESSURE HALF TIME: 56 MS
MV VALVE AREA P 1/2 METHOD: 3.93
MV VALVE AREA P 1/2 METHOD: 3.93
NEUTROPHILS # BLD AUTO: 7.72 THOUSANDS/ÂΜL (ref 1.85–7.62)
NEUTROPHILS # BLD AUTO: 7.72 THOUSANDS/ÂΜL (ref 1.85–7.62)
NEUTS SEG NFR BLD AUTO: 74 % (ref 43–75)
NEUTS SEG NFR BLD AUTO: 74 % (ref 43–75)
NRBC BLD AUTO-RTO: 0 /100 WBCS
NRBC BLD AUTO-RTO: 0 /100 WBCS
PHOSPHATE SERPL-MCNC: 2.7 MG/DL (ref 2.3–4.1)
PHOSPHATE SERPL-MCNC: 2.7 MG/DL (ref 2.3–4.1)
PLATELET # BLD AUTO: 172 THOUSANDS/UL (ref 149–390)
PLATELET # BLD AUTO: 172 THOUSANDS/UL (ref 149–390)
PMV BLD AUTO: 10.9 FL (ref 8.9–12.7)
PMV BLD AUTO: 10.9 FL (ref 8.9–12.7)
POTASSIUM SERPL-SCNC: 2.2 MMOL/L (ref 3.5–5.3)
POTASSIUM SERPL-SCNC: 2.2 MMOL/L (ref 3.5–5.3)
PROCALCITONIN SERPL-MCNC: 0.43 NG/ML
PROCALCITONIN SERPL-MCNC: 0.43 NG/ML
RBC # BLD AUTO: 3.81 MILLION/UL (ref 3.88–5.62)
RBC # BLD AUTO: 3.81 MILLION/UL (ref 3.88–5.62)
RIGHT ATRIUM AREA SYSTOLE A4C: 16.9 CM2
RIGHT ATRIUM AREA SYSTOLE A4C: 16.9 CM2
RIGHT VENTRICLE ID DIMENSION: 3.3 CM
RIGHT VENTRICLE ID DIMENSION: 3.3 CM
SL CV LEFT ATRIUM LENGTH A2C: 5.9 CM
SL CV LEFT ATRIUM LENGTH A2C: 5.9 CM
SL CV LV EF: 64
SL CV LV EF: 64
SL CV PED ECHO LEFT VENTRICLE DIASTOLIC VOLUME (MOD BIPLANE) 2D: 92 ML
SL CV PED ECHO LEFT VENTRICLE DIASTOLIC VOLUME (MOD BIPLANE) 2D: 92 ML
SL CV PED ECHO LEFT VENTRICLE SYSTOLIC VOLUME (MOD BIPLANE) 2D: 43 ML
SL CV PED ECHO LEFT VENTRICLE SYSTOLIC VOLUME (MOD BIPLANE) 2D: 43 ML
SODIUM SERPL-SCNC: 143 MMOL/L (ref 135–147)
SODIUM SERPL-SCNC: 143 MMOL/L (ref 135–147)
TRICUSPID ANNULAR PLANE SYSTOLIC EXCURSION: 1.8 CM
TRICUSPID ANNULAR PLANE SYSTOLIC EXCURSION: 1.8 CM
WBC # BLD AUTO: 10.32 THOUSAND/UL (ref 4.31–10.16)
WBC # BLD AUTO: 10.32 THOUSAND/UL (ref 4.31–10.16)

## 2025-05-22 PROCEDURE — 80048 BASIC METABOLIC PNL TOTAL CA: CPT

## 2025-05-22 PROCEDURE — 99223 1ST HOSP IP/OBS HIGH 75: CPT | Performed by: INTERNAL MEDICINE

## 2025-05-22 PROCEDURE — NC001 PR NO CHARGE: Performed by: STUDENT IN AN ORGANIZED HEALTH CARE EDUCATION/TRAINING PROGRAM

## 2025-05-22 PROCEDURE — 83880 ASSAY OF NATRIURETIC PEPTIDE: CPT

## 2025-05-22 PROCEDURE — 87040 BLOOD CULTURE FOR BACTERIA: CPT | Performed by: INTERNAL MEDICINE

## 2025-05-22 PROCEDURE — 99232 SBSQ HOSP IP/OBS MODERATE 35: CPT | Performed by: INTERNAL MEDICINE

## 2025-05-22 PROCEDURE — 99222 1ST HOSP IP/OBS MODERATE 55: CPT | Performed by: INTERNAL MEDICINE

## 2025-05-22 PROCEDURE — 82948 REAGENT STRIP/BLOOD GLUCOSE: CPT

## 2025-05-22 PROCEDURE — 93306 TTE W/DOPPLER COMPLETE: CPT

## 2025-05-22 PROCEDURE — 85025 COMPLETE CBC W/AUTO DIFF WBC: CPT

## 2025-05-22 PROCEDURE — 93306 TTE W/DOPPLER COMPLETE: CPT | Performed by: INTERNAL MEDICINE

## 2025-05-22 PROCEDURE — 83735 ASSAY OF MAGNESIUM: CPT

## 2025-05-22 RX ORDER — ACETAMINOPHEN 325 MG/1
650 TABLET ORAL EVERY 6 HOURS PRN
Status: DISCONTINUED | OUTPATIENT
Start: 2025-05-22 | End: 2025-05-28 | Stop reason: HOSPADM

## 2025-05-22 RX ORDER — HYDROCORTISONE 20 MG/1
40 TABLET ORAL EVERY MORNING
Status: DISCONTINUED | OUTPATIENT
Start: 2025-05-23 | End: 2025-05-23

## 2025-05-22 RX ORDER — POTASSIUM CHLORIDE 1500 MG/1
40 TABLET, EXTENDED RELEASE ORAL ONCE
Status: COMPLETED | OUTPATIENT
Start: 2025-05-22 | End: 2025-05-22

## 2025-05-22 RX ORDER — DEXTROSE, SODIUM CHLORIDE, SODIUM LACTATE, POTASSIUM CHLORIDE, AND CALCIUM CHLORIDE 5; .6; .31; .03; .02 G/100ML; G/100ML; G/100ML; G/100ML; G/100ML
50 INJECTION, SOLUTION INTRAVENOUS CONTINUOUS
Status: DISCONTINUED | OUTPATIENT
Start: 2025-05-22 | End: 2025-05-23

## 2025-05-22 RX ORDER — INSULIN GLARGINE 100 [IU]/ML
5 INJECTION, SOLUTION SUBCUTANEOUS
Status: DISCONTINUED | OUTPATIENT
Start: 2025-05-22 | End: 2025-05-23

## 2025-05-22 RX ORDER — MAGNESIUM SULFATE HEPTAHYDRATE 40 MG/ML
2 INJECTION, SOLUTION INTRAVENOUS ONCE
Status: COMPLETED | OUTPATIENT
Start: 2025-05-22 | End: 2025-05-22

## 2025-05-22 RX ORDER — POTASSIUM CHLORIDE 14.9 MG/ML
20 INJECTION INTRAVENOUS ONCE
Status: COMPLETED | OUTPATIENT
Start: 2025-05-22 | End: 2025-05-22

## 2025-05-22 RX ADMIN — SENNOSIDES 8.6 MG: 8.6 TABLET, FILM COATED ORAL at 08:43

## 2025-05-22 RX ADMIN — HYDROCORTISONE 20 MG: 20 TABLET ORAL at 17:28

## 2025-05-22 RX ADMIN — ATORVASTATIN CALCIUM 10 MG: 10 TABLET, FILM COATED ORAL at 17:31

## 2025-05-22 RX ADMIN — MIDODRINE HYDROCHLORIDE 5 MG: 5 TABLET ORAL at 17:31

## 2025-05-22 RX ADMIN — MAGNESIUM SULFATE HEPTAHYDRATE 2 G: 40 INJECTION, SOLUTION INTRAVENOUS at 08:44

## 2025-05-22 RX ADMIN — SERTRALINE HYDROCHLORIDE 100 MG: 100 TABLET ORAL at 08:44

## 2025-05-22 RX ADMIN — ACETAMINOPHEN 650 MG: 325 TABLET ORAL at 15:00

## 2025-05-22 RX ADMIN — INSULIN LISPRO 3 UNITS: 100 INJECTION, SOLUTION INTRAVENOUS; SUBCUTANEOUS at 22:07

## 2025-05-22 RX ADMIN — FLUDROCORTISONE ACETATE 0.2 MG: 0.1 TABLET ORAL at 11:02

## 2025-05-22 RX ADMIN — HYDROCORTISONE 30 MG: 10 TABLET ORAL at 11:01

## 2025-05-22 RX ADMIN — POTASSIUM CHLORIDE 20 MEQ: 14.9 INJECTION, SOLUTION INTRAVENOUS at 08:44

## 2025-05-22 RX ADMIN — POTASSIUM CHLORIDE 40 MEQ: 1500 TABLET, EXTENDED RELEASE ORAL at 08:43

## 2025-05-22 RX ADMIN — APIXABAN 5 MG: 5 TABLET, FILM COATED ORAL at 17:31

## 2025-05-22 RX ADMIN — Medication 1000 UNITS: at 08:43

## 2025-05-22 RX ADMIN — INSULIN GLARGINE 5 UNITS: 100 INJECTION, SOLUTION SUBCUTANEOUS at 22:06

## 2025-05-22 RX ADMIN — APIXABAN 5 MG: 5 TABLET, FILM COATED ORAL at 08:44

## 2025-05-22 RX ADMIN — DEXTROSE, SODIUM CHLORIDE, SODIUM LACTATE, POTASSIUM CHLORIDE, AND CALCIUM CHLORIDE 50 ML/HR: 5; .6; .31; .03; .02 INJECTION, SOLUTION INTRAVENOUS at 17:30

## 2025-05-22 RX ADMIN — MIDODRINE HYDROCHLORIDE 5 MG: 5 TABLET ORAL at 11:01

## 2025-05-22 RX ADMIN — INSULIN LISPRO 2 UNITS: 100 INJECTION, SOLUTION INTRAVENOUS; SUBCUTANEOUS at 17:16

## 2025-05-22 RX ADMIN — ASPIRIN 81 MG CHEWABLE TABLET 81 MG: 81 TABLET CHEWABLE at 08:43

## 2025-05-22 NOTE — ASSESSMENT & PLAN NOTE
History of renal insufficiency  Discussed with endocrinology  He is now on hydrocortisone 40 mg a.m. 30 mg p.m.  Endocrinology following

## 2025-05-22 NOTE — PLAN OF CARE
Problem: PAIN - ADULT  Goal: Verbalizes/displays adequate comfort level or baseline comfort level  Description: Interventions:  - Encourage patient to monitor pain and request assistance  - Assess pain using appropriate pain scale  - Administer analgesics as ordered based on type and severity of pain and evaluate response  - Implement non-pharmacological measures as appropriate and evaluate response  - Consider cultural and social influences on pain and pain management  - Notify physician/advanced practitioner if interventions unsuccessful or patient reports new pain  - Educate patient/family on pain management process including their role and importance of  reporting pain   - Provide non-pharmacologic/complimentary pain relief interventions  Outcome: Progressing     Problem: INFECTION - ADULT  Goal: Absence or prevention of progression during hospitalization  Description: INTERVENTIONS:  - Assess and monitor for signs and symptoms of infection  - Monitor lab/diagnostic results  - Monitor all insertion sites, i.e. indwelling lines, tubes, and drains  - Monitor endotracheal if appropriate and nasal secretions for changes in amount and color  - [place] appropriate cooling/warming therapies per order  - Administer medications as ordered  - Instruct and encourage patient and family to use good hand hygiene technique  - Identify and instruct in appropriate isolation precautions for identified infection/condition  Outcome: Progressing  Goal: Absence of fever/infection during neutropenic period  Description: INTERVENTIONS:  - Monitor WBC  - Perform strict hand hygiene  - Limit to healthy visitors only  - No plants, dried, fresh or silk flowers with gillis in patient room  Outcome: Progressing     Problem: SAFETY ADULT  Goal: Patient will remain free of falls  Description: INTERVENTIONS:  - Educate patient/family on patient safety including physical limitations  - Instruct patient to call for assistance with activity   -  Consider consulting OT/PT to assist with strengthening/mobility based on AM PAC & JH-HLM score  - Consult OT/PT to assist with strengthening/mobility   - Keep Call bell within reach  - Keep bed low and locked with side rails adjusted as appropriate  - Keep care items and personal belongings within reach  - Initiate and maintain comfort rounds  - Make Fall Risk Sign visible to staff  - Apply yellow socks and bracelet for high fall risk patients  - Consider moving patient to room near nurses station  Outcome: Progressing  Goal: Maintain or return to baseline ADL function  Description: INTERVENTIONS:  -  Assess patient's ability to carry out ADLs; assess patient's baseline for ADL function and identify physical deficits which impact ability to perform ADLs (bathing, care of mouth/teeth, toileting, grooming, dressing, etc.)  - Assess/evaluate cause of self-care deficits   - Assess range of motion  - Assess patient's mobility; develop plan if impaired  - Assess patient's need for assistive devices and provide as appropriate  - Encourage maximum independence but intervene and supervise when necessary  - Involve family in performance of ADLs  - Assess for home care needs following discharge   - Consider OT consult to assist with ADL evaluation and planning for discharge  - Provide patient education as appropriate  - Monitor functional capacity and physical performance, use of AM PAC & JH-HLM   - Monitor gait, balance and fatigue with ambulation    Outcome: Progressing  Goal: Maintains/Returns to pre admission functional level  Description: INTERVENTIONS:  - Perform AM-PAC 6 Click Basic Mobility/ Daily Activity assessment daily.  - Set and communicate daily mobility goal to care team and patient/family/caregiver.   - Collaborate with rehabilitation services on mobility goals if consulted  - Out of bed for toileting  - Record patient progress and toleration of activity level   Outcome: Progressing     Problem: DISCHARGE  PLANNING  Goal: Discharge to home or other facility with appropriate resources  Description: INTERVENTIONS:  - Identify barriers to discharge w/patient and caregiver  - Arrange for needed discharge resources and transportation as appropriate  - Identify discharge learning needs (meds, wound care, etc.)  - Arrange for interpretive services to assist at discharge as needed  - Refer to Case Management Department for coordinating discharge planning if the patient needs post-hospital services based on physician/advanced practitioner order or complex needs related to functional status, cognitive ability, or social support system  Outcome: Progressing     Problem: Knowledge Deficit  Goal: Patient/family/caregiver demonstrates understanding of disease process, treatment plan, medications, and discharge instructions  Description: Complete learning assessment and assess knowledge base.  Interventions:  - Provide teaching at level of understanding  - Provide teaching via preferred learning methods  Outcome: Progressing     Problem: Prexisting or High Potential for Compromised Skin Integrity  Goal: Skin integrity is maintained or improved  Description: INTERVENTIONS:  - Identify patients at risk for skin breakdown  - Assess and monitor skin integrity including under and around medical devices   - Assess and monitor nutrition and hydration status  - Monitor labs  - Assess for incontinence   - Turn and reposition patient  - Assist with mobility/ambulation  - Relieve pressure over rea prominences   - Avoid friction and shearing  - Provide appropriate hygiene as needed including keeping skin clean and dry  - Evaluate need for skin moisturizer/barrier cream  - Collaborate with interdisciplinary team  - Patient/family teaching  - Consider wound care consult    Outcome: Progressing

## 2025-05-22 NOTE — ASSESSMENT & PLAN NOTE
Present on admission glucose of 40  Presently receiving IV fluids  Encourage p.o. intake  Hydrocortisone dose increased to 40 mg a.m., 30 mg p.m.  Endocrinology inputs noted

## 2025-05-22 NOTE — CASE MANAGEMENT
Case Management Assessment & Discharge Planning Note    Patient name Karson You  Location [facility] 815/[facility] 815-01 MRN [id number]  : 1955 Date 2025       Current Admission Date: 2025  Current Admission Diagnosis:Syncope and collapse   Patient Active Problem List    Diagnosis Date Noted    Paroxysmal A-fib ([facility]) 2025    Adrenal insufficiency ([facility]) 2025    Hypoglycemia 2025    Syncope and collapse 2025    Orthostatic hypotension 2025    History of cerebrovascular accident (CVA) in adulthood 2025    Iron deficiency anemia 2025    CKD (chronic kidney disease) stage 5, GFR less than 15 ml/min ([facility]) 2025    Chronic heart failure with preserved ejection fraction ([facility]) 2025    PAD (peripheral artery disease) ([facility]) 2025    DISH (diffuse idiopathic skeletal hyperostosis) 2025    Hypocalcemia 2025    Hypokalemia 2025    SIRS (systemic inflammatory response syndrome) ([facility]) 2025      LOS (days): 0  Geometric Mean LOS (GMLOS) (days):   Days to GMLOS:     OBJECTIVE:     Current admission status: Observation       Preferred Pharmacy:   PATIENT/FAMILY REPORTS NO PREFERRED PHARMACY  No address on file      Primary Care Provider: No primary care provider on file.    Primary Insurance: [employer] [facility]  Secondary Insurance:     ASSESSMENT:  Active Health Care Proxies    There are no active Health Care Proxies on file.       Readmission Root Cause  30 Day Readmission: No    Patient Information  Admitted from:: Home  Mental Status: Alert  During Assessment patient was accompanied by: Sister (Sonia - sister)  Assessment information provided by:: Sister  Primary Caregiver: Family  Caregiver's Name:: Sonia - Sister  Caregiver's Relationship to Patient:: Family Member  Support Systems: Self, Family members, Spouse/significant other, Private Caregivers, Daughter, Son  County of Residence: [place]  What city do you live  in?: [place]  Home entry access options. Select all that apply.: Stairs  Number of steps to enter home.: 5  Do the steps have railings?: Yes  Type of Current Residence: [address] home  Upon entering residence, is there a bedroom on the main floor (no further steps)?: Yes  Upon entering residence, is there a bathroom on the main floor (no further steps)?: Yes  Living Arrangements: Lives w/ Family members, Lives w/ Spouse/significant other (Lives w/ Sister Sonia and significant other)  Is patient a ?: No    Activities of Daily Living Prior to Admission  Functional Status: Assistance  Completes ADLs independently?: No  Level of ADL dependence: Assistance  Ambulates independently?: No  Level of ambulatory dependence: Assistance  Does patient use assisted devices?: Yes  Assisted Devices (DME) used: Walker, Rollator, Other (Comment) (bed alarm)  Does patient currently own DME?: Yes  What DME does the patient currently own?: Walker, Rollator  Does patient have a history of Outpatient Therapy (PT/OT)?: No  Does the patient have a history of Short-Term Rehab?: Yes ([facility])  Does patient have a history of HHC?: Yes ([facility] PT/OT/SN)  Does patient currently have HHC?: Yes    Current Home Health Care  Type of Current Home Care Services: Home PT, Home OT, Nurse visit  Home Health Agency Name:: [facility]  Current Home Health Follow-Up Provider:: PCP    Patient Information Continued  Income Source: Pension/snf  Does patient have prescription coverage?: Yes  Can the patient afford their medications and any related supplies (such as glucometers or test strips)?: Yes  Does patient receive dialysis treatments?: No  Does patient have a history of substance abuse?: No  Does patient have a history of Mental Health Diagnosis?: No    Means of Transportation  Means of Transport to Appts:: Family transport    DISCHARGE DETAILS:    Discharge planning discussed with:: pt and pt's sister Sonia at  bedside  Freedom of Choice: Yes     CM contacted family/caregiver?: Yes    Contacts  Patient Contacts: Sonia - sister  Relationship to Patient:: Family  Contact Method: In Person  Reason/Outcome: Continuity of Care, Emergency Contact, Referral, Discharge Planning    Requested Home Health Care         [facility]:: [facility] met with pt and sister Sonia to introduce role and complete assessment.  Pt lives w/ his significant other and sister Sonia in 3[place] w/ FFSU w/ 5 RYAN  Pt required assist x1 PTA w/ ADL/IADLs and ambulating  Per Sonia, pt broke his hip in February and has not been walking the same since.  Pt was at [facility] for STR after hip fx, and now has [facility] PT/OT/SN.  If PT/OT recs STR, pt would like to return to [facility].  CM following for recs

## 2025-05-22 NOTE — CONSULTS
Consultation - Geriatric Medicine   Karson You 70 y.o. male MRN: [id number]  Unit/Bed#: [facility] 815-01 Encounter: [id number]      Assessment & Plan     Syncope and collapse   -witnessed event with reported brief loss of consciousness   -cleared by Trauma for admission to medical service  -suspected multifactorial including possible orthostatic component, hypoglycemia and electrolyte derangements with in patient with underlying adrenal insufficiency, orthostatic hypotension and recurrent similar episodes  -continue optimization of hemodynamics  -continue correction of electrolyte derangements [hypokalemia] and monitor for hyponatremia vicki with chronic daily use of sertraline   -continue compression stockings and abdominal binder as directed   -encourage good body mechanics, consider checking orthostatics   -Echo pending    Paroxysmal A-fib  -home regimen includes a/c with Eliquis  -appears to be rate controlled non-pharmacologically    -follows regularly with Cardiology, last seen 5/6/25, continue close outpatient follow-up for ongoing monitoring and management    Orthostatic hypotension  -maintained on midodrine and florinef chronically as o/p  -Continue optimization of hemodynamics and good body mechanics   -home lasix temporarily on hold 2/2 syncope as above, monitor volume status, electrolytes, and daily weights closely    Adrenal insufficiency  -Maintained on Florinef and Cortef chronically as outpatient  -Follows regularly with Endocrinology as outpatient, continue close o/p f/u for ongoing monitoring and management     Chronic heart failure with preserved ejection fraction  -EF 60% on last echo 12/2023, repeat ordered on admission and pending   -BNP slightly elevated at 391 on admission with pleural effusions noted on CT chest, also with LE edema concerning for volume overload however home lasix on hold 2/2 syncope as above, continue to monitor electrolytes, daily weights, volume and resp status closely, would  have low threshold to resume diuresis as long as BP remains stable   -Continue close o/p patient follow-up with PCP and Cardiology for ongoing monitoring and management    Hypokalemia  -[K] low at 2.2 this morning  -Multifactorial including frequent Lasix use as outpatient  -Continue repletion, recheck with am labs    DM-I  -A1c well-controlled at 6.9 however patient noted to be brittle diabetic maintained on basal bolus regimen as o/p   -continue insulin titration to goal glu 140-180 during hospitalization to reduce risk hypoglycemia  -Continue to encourage healthy lifestyle choices/modifications and diabetic diet  -Continue close outpatient follow-up with PCP and Endo for ongoing age-appropriate hepatic screenings and cares    Cognitive screening  -Alert and oriented to person place and general situation  -Reportedly requires some assist with ADLs and IADLs at baseline  -MoCA 21/30 (6/30/21) suggestive of some degree of underlying cognitive impairment at baseline, consider repeat testing as outpatient following recovery from acute illness to establish new baseline  -CTH obtained on admission imaging personally viewed, reveals at least moderate diffuse chronic microangiopathic changes most notable bilateral temporal areas  -TSH WNL 0.828, B12   -At risk age and cardiovascular progression of underlying cognitive impairment, continue secondary risk factor modifications  -Encourage patient remain physically, socially, cognitively active and engaged to maintain cognitive acuity  -Encourage use of sensory assistive device such as corrective lenses at all appropriate times to reduce risk of uncorrected sensory impairment from contributing to isolation, confusion, encephalopathy and more precipitous cognitive decline    Major depressive disorder  -Symptoms reportedly well-controlled with home Zoloft regimen, continue home dosing  -Consider outpatient referral to counseling/support group as part of comprehensive  multimodal treatment approach  -Reports good psychosocial support system in the home  -Continue close outpatient follow-up with PCP for ongoing monitoring and management    Impaired Vision  -recommend use of corrective lenses at all appropriate times  -encourage adequate lighting and encourage use of assistance with ambulation  -keep personal belongings close to person to avoid reaching  -encourage appropriate footwear at all times  -Consider large font for printed materials provided to patient    Deconditioning/debility/frailty  -Clinical frailty scale stage VI, moderately frail, progressive   -Multifactorial including age, brittle type I diabetic, CHF with preserved ejection fraction, ambulatory function with recurrent falls, adrenal insufficiency and multitude of additional chronic medical comorbidities now with recurrent orthostatic hypotension and syncope/collapse  -Continue optimization chronic additions and address acute metabolic derangements as arise  -continue to encourage well-balanced nutritional intake  -Ensure underlying anxiety/mood/depression symptoms are well-controlled as may impact patient response to therapies as well as overall sense of wellbeing and quality of life  -Continue to ensure the treatment interventions align the patient's wishes and goals of care  -Continue psychosocial supports of patient and caregivers    Home medication review   [facility] [phone]:    Albuterol 2 puffs every 4 hours as needed  Magnesium oxide 400 Mg twice daily  Midodrine 5 Mg 3 times daily  Eliquis 5 Mg twice daily  Aspirin 81 Mg daily  Lipitor 10 Mg daily  Diclofenac 1% 2 g topically 4 times daily  Florinef 0.2 Mg daily  Lasix 20 Mg daily   Cortef 5 Mg tablet -3 tablets in the morning, 2 tablets in afternoon  Insulin glargine 14 units at bedtime  Insulin lispro 4 units 3 times daily with meals plus sliding scale  Lidoderm 5% patch topically every 12 hours  Robaxin 500 Mg 3 times daily as needed  KCl 10 mEq  daily  Zoloft 100 Mg daily    Care coordination: rounded with Sophie (RN)    History of Present Illness   Physician Requesting Consult: Miguelito Martinez, *  Reason for Consult / Principal Problem: syncope and collapse   Hx and PE limited by: N/A  Additional history obtained from: Chart review and patient evaluation    HPI: Karson You is a 70 y.o. year old male with major depressive disorder, neurocognitive disorder, nicotine dependence, hyperlipidemia, adrenal insufficiency, DM-I with diabetic peripheral neuropathy, CKD 5, hypertension, PAD, orthostatic hypotension, paroxysmal A-fib, recurrent syncope, vitamin D deficiency, and osteoporosis who is admitted to the medical service with syncope and collapse, he is being seen in consultation by Geriatrics for high risk developing delirium during hospitalization. Karson is seen and examined at bedside where he is lying resting comfortably, he explains that he is admitted following a syncopal episode at home which occurred when getting up from seated position and blacked out losing consciousness waking on the ground with no recollection of  the event other than what was told to him by his significant other who witnessed it. He notes at least two additional similar episodes since returning home from hospitalization in February at which time he was admitted with fall and femur fracture. He reports that he has been recovering well from that injury and continues to use a walker for ambulation at baseline. He continues to struggle with recurrent syncope however noting at times symptoms seem orthostatic in nature others they occur with no warning, similar events also occur when blood sugars are persistently in low 60s.     Prior to admission Karson was residing home with his girlfriend, he notes that he requires some assist with ADLs and iADLs at baseline but denies memory or cognitive concerns. He requires use of walker as noted above and has history of recurrent falls. He  wears glasses, does not use hearing aid or dentures.     Inpatient consult to Gerontology  Consult performed by: Alina Rocha DO  Consult ordered by: Delores Kan MD        Review of Systems   Constitutional: Negative.  Negative for chills and fever.   HENT: Negative.     Eyes:  Positive for visual disturbance. Photophobia: wears glasses.  Respiratory: Negative.  Negative for shortness of breath.    Cardiovascular:  Positive for leg swelling (chronic). Negative for chest pain and palpitations.   Gastrointestinal: Negative.  Negative for abdominal pain.   Genitourinary: Negative.    Musculoskeletal:  Positive for gait problem.   Skin: Negative.    Neurological:  Negative for dizziness, weakness, light-headedness, numbness and headaches.   Hematological: Negative.    Psychiatric/Behavioral: Negative.  Negative for sleep disturbance.    All other systems reviewed and are negative.    Historical Information   Past Medical History:   Diagnosis Date    Stroke (HCC)      Past Surgical History:   Procedure Laterality Date    CARPAL TUNNEL RELEASE       Social History   Social History     Substance and Sexual Activity   Alcohol Use Never     Social History     Substance and Sexual Activity   Drug Use Never     Social History     Tobacco Use   Smoking Status Never   Smokeless Tobacco Never     Family History:   Family History   Problem Relation Name Age of Onset    Heart disease Mother       Meds/Allergies   all current active meds have been reviewed    No Known Allergies    Objective     Intake/Output Summary (Last 24 hours) at 5/22/2025 0748  Last data filed at 5/21/2025 2110  Gross per 24 hour   Intake 151.25 ml   Output --   Net 151.25 ml     Invasive Devices       Peripheral Intravenous Line  Duration             Peripheral IV 05/21/25 Left Antecubital 1 day    Peripheral IV 05/21/25 Right Forearm <1 day                  Physical Exam  Vitals and nursing note reviewed.   Constitutional:       General: He is not in  acute distress.     Appearance: Normal appearance. He is not toxic-appearing.   HENT:      Head: Normocephalic.      Nose: Nose normal.      Mouth/Throat:      Mouth: Mucous membranes are dry.     Eyes:      General: No scleral icterus.        Right eye: No discharge.         Left eye: No discharge.      Conjunctiva/sclera: Conjunctivae normal.      Comments: Wearing glasses    Neck:      Comments: Phonation norm   Cardiovascular:      Rate and Rhythm: Normal rate and regular rhythm.   Pulmonary:      Effort: Pulmonary effort is normal. No respiratory distress.      Breath sounds: No wheezing.      Comments: Saturating well on room air   Abdominal:      General: Bowel sounds are normal. There is no distension.      Palpations: Abdomen is soft.      Tenderness: There is no abdominal tenderness.     Musculoskeletal:      Cervical back: Neck supple.      Right lower leg: Edema present.      Left lower leg: Edema present.      Comments: Reduced overall muscle mass      Skin:     General: Skin is warm and dry.     Neurological:      Mental Status: He is alert.      Comments: Awake and alert oriented to person place general situation    Psychiatric:         Mood and Affect: Mood normal.         Behavior: Behavior normal.       Lab Results:     I have personally reviewed pertinent lab results including the followin/21/25-POC blood gas/i-STAT, CBC, CMP, CK, troponin, lactic acid, procalcitonin, total protein, PT/INR, PTT, A1c, viral panel  25-CBC, BMP, BNP, magnesium    I have personally reviewed the following imaging study reports in PACS:    25- CTH, CT c-spine, CT chest abd pelvis     Therapies:   PT: pending   OT: pending     VTE Prophylaxis: Eliquis     Code Status: Level 1 - Full Code  Advance Directive and Living Will:      Power of :    POLST:      Family and Social Support:   Living Arrangements: Lives w/ Family members  Support Systems: Self  Assistance Needed: no  Type of Current  Residence: Private residence  Current Home Care Services: Yes  Type of Current Home Care Services: Home health aide    Goals of Care: recovery from acute illness

## 2025-05-22 NOTE — ASSESSMENT & PLAN NOTE
Noted on admission CT abdomen pelvis as part of trauma workup    Also with known osteoporosis.  Follows with endocrine in the outpatient setting with Margarita Callahan.  Status post left ORIF for osteoporotic fracture from standing height at home in February 2025.    Plan:  Vitamin D, calcium supplementation in the setting of known vitamin D deficiency  Endocrinology inputs noted

## 2025-05-22 NOTE — ASSESSMENT & PLAN NOTE
Noted on admission with corrected level of 7.5.  Likely in the setting of CKD 5, as well as vitamin D deficiency.  Monitor

## 2025-05-22 NOTE — ASSESSMENT & PLAN NOTE
Wt Readings from Last 3 Encounters:   05/22/25 87.1 kg (192 lb)     Prior to admission on 20 mg lasix daily  Presently Lasix on hold  2D echo reveals EF 64%, grade 2 diastolic dysfunction  Low-salt diet

## 2025-05-22 NOTE — CONSULTS
Consultation - Endocrinology   Name: Karson You 70 y.o. male I MRN: [id number]  [facility] I Date of Admission: 5/21/2025   Date of Service: 5/22/2025 I Hospital Day: 0   Inpatient consult to Endocrinology  Consult performed by: Josh Stinson MD  Consult ordered by: Faiza Casas MD        Physician Requesting Evaluation: Miguelito Martinez, *   Reason for Evaluation / Principal Problem: Syncope, hypoglycemia    Assessment & Plan  Syncope and collapse  In the setting of hypoglycemia as evidenced by blood sugar of 40 mg/dL on BMP on admission.   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  Recommend increasing hydrocortisone to 40 mg in the morning and 30 mg in the evening, which will be double his home dose.  Encourage p.o. intake  Wean off of 5% dextrose LR as tolerated  Continue monitoring vital signs and blood sugars  Correct electrolyte abnormalities  Rest of plan as below  Adrenal insufficiency (HCC)  At home takes hydrocortisone 20 mg in the morning, 15 mg in the afternoon  Recommend increasing to 40 mg in the morning and 30 mg in the afternoon  Wean hydrocortisone as tolerated back to home dose  Hypoglycemia  Plan as above  Wean off of D5 LR as tolerated  Orthostatic hypotension  Continue fludrocortisone 0.2 mg daily  Type 1 diabetes mellitus (HCC)  Lab Results   Component Value Date    HGBA1C 6.9 (H) 05/21/2025       Recent Labs     05/22/25  0000 05/22/25  0209 05/22/25  0839 05/22/25  1112   POCGLU 68 78 111 167*     Blood Sugar Average: Last 72 hrs:  (P) 88.6  Type 1 diabetes mellitus with hyper and hypoglycemia  To have brittle blood sugars, previously admitted with hypoglycemia and known to endocrinology service  At home he takes Toujeo 12 units nightly, Humalog 4 units 3 times daily before meal, correctional scale insulin 1 unit for every 50 mg over 150  Currently has sliding scale insulin algorithm 3  before meals and 2 at bedtime  Lantus 10 units nightly currently on hold.  Given patient with type 1 diabetes, advised against holding basal insulin.    Plan:  Recommend Lantus 5 units nightly tonight  Continue sliding scale insulin algorithm 3 before meals and 2 at bedtime  Continue Accu-Cheks before meals and at bedtime  Encourage p.o. intake.  Wean D5LR as tolerated  Goal blood sugar while in the qovcsolf-583-889 mg/dL  Endocrinology will continue following      History of Present Illness   Karson You is a 70 y.o. male with a past medical history of brittle type 1 diabetes mellitus, adrenal insufficiency, orthostatic hypotension, osteoporosis, DISH, CAD, hypertension, hyperlipidemia, HFpEF, CKD stage V, prior history of hip fracture in February 2025 status post ORIF who is seen today in consultation.  Patient presented on 5/21/2025 for a syncopal episode at home while ambulating.  Reportedly had a wet cough for the past 2 days prior to presentation, however on my evaluation, he denied having those symptoms.  Denied dizziness, lightheadedness, nausea, vomiting, chest pain, shortness of breath, abdominal pain, diarrhea or constipation.  Denies any pain.  Endorses good appetite at home, however notes that he does not like the food here and has not eaten much.  Denies sick contacts.  BMP on admission demonstrated a BG of 40 mg/dL, as well as corrected calcium of 7.5 mg/dL and potassium of 2.8.  He received an ampule of D50.  Patient was started on 5% dextrose drip and hydrocortisone was increased to 30 mg in the morning and 20 mg in the afternoon for stress dosing.  Patient has a history of brittle type 1 diabetes mellitus diagnosed 37 years ago.  Home regimen includes Toujeo 12 units nightly, Humalog 4 units 3 times daily before meals and sliding scale Humalog 1 unit for every 50 mg/dL over 150.  Additionally has a history of adrenal insufficiency diagnosed after he presented with cardiac arrest in 2002 secondary to  hypoglycemia with his prior endocrinologist-Dr. Castañeda-noting he has performed multiple ACTH stim test which were all abnormal.  At home he takes hydrocortisone 20 mg in the morning and 50 mg at night.  Also takes fludrocortisone 0.2 mg/day for orthostatic hypotension.  Denies missing insulin or medication doses, does not think he has given himself extra insulin at home.      Review of Systems   Constitutional:  Negative for appetite change and unexpected weight change.   HENT:  Negative for congestion.    Eyes:  Negative for visual disturbance.   Respiratory:  Negative for cough and shortness of breath.    Cardiovascular:  Negative for chest pain, palpitations and leg swelling.   Gastrointestinal:  Negative for abdominal pain, constipation, diarrhea, nausea and vomiting.   Endocrine: Negative for polydipsia and polyuria.   Genitourinary:  Negative for frequency.   Musculoskeletal:  Negative for myalgias.   Skin:  Negative for rash.   Neurological:  Positive for syncope. Negative for dizziness, weakness, light-headedness, numbness and headaches.   Psychiatric/Behavioral:  Negative for sleep disturbance.    All other systems reviewed and are negative.    Medical History Review: I have reviewed the patient's PMH, PSH, Social History, Family History, Meds, and Allergies   Historical Information   Past Medical History[1]  Past Surgical History[2]  Social History[3]  E-Cigarette/Vaping     E-Cigarette/Vaping Substances     Family History[4]  Social History[5]    Current Facility-Administered Medications:     aluminum-magnesium hydroxide-simethicone (MAALOX) oral suspension 30 mL, Q6H PRN    apixaban (ELIQUIS) tablet 5 mg, BID    aspirin chewable tablet 81 mg, Daily    atorvastatin (LIPITOR) tablet 10 mg, Daily With Dinner    Cholecalciferol (VITAMIN D3) tablet 1,000 Units, Daily    dextrose 5 % in lactated Ringer's infusion, Continuous, Last Rate: 50 mL/hr (05/21/25 9786)    fludrocortisone (FLORINEF) tablet 0.2 mg,  Daily    hydrocortisone (CORTEF) tablet 30 mg, QAM **AND** hydrocortisone (CORTEF) tablet 20 mg, Daily With Lunch    [Held by provider] insulin glargine (LANTUS) subcutaneous injection 10 Units 0.1 mL, HS    insulin lispro (HumALOG/ADMELOG) 100 units/mL subcutaneous injection 1-5 Units, HS    insulin lispro (HumALOG/ADMELOG) 100 units/mL subcutaneous injection 1-6 Units, TID AC **AND** Fingerstick Glucose (POCT), TID AC    midodrine (PROAMATINE) tablet 5 mg, TID AC    ondansetron (ZOFRAN) injection 4 mg, Q6H PRN    senna (SENOKOT) tablet 8.6 mg, Daily    sertraline (ZOLOFT) tablet 100 mg, Daily  None     Patient has no known allergies.    Objective :  Temp:  [98 °F (36.7 °C)-99.2 °F (37.3 °C)] 98 °F (36.7 °C)  HR:  [64-87] 87  BP: (101-180)/(53-95) 127/78  Resp:  [16-23] 18  SpO2:  [92 %-98 %] 95 %  O2 Device: None (Room air)    Physical Exam  Vitals and nursing note reviewed.   Constitutional:       General: He is not in acute distress.     Appearance: Normal appearance. He is not ill-appearing, toxic-appearing or diaphoretic.   HENT:      Head: Normocephalic and atraumatic.      Nose: Nose normal.      Mouth/Throat:      Pharynx: Oropharynx is clear.     Eyes:      General: No scleral icterus.        Right eye: No discharge.         Left eye: No discharge.      Extraocular Movements: Extraocular movements intact.      Conjunctiva/sclera: Conjunctivae normal.       Cardiovascular:      Rate and Rhythm: Normal rate and regular rhythm.   Pulmonary:      Effort: Pulmonary effort is normal. No respiratory distress.      Breath sounds: Rhonchi (Coarse rhonchi bilaterally) present. No wheezing or rales.   Abdominal:      General: There is no distension.     Musculoskeletal:      Cervical back: Normal range of motion and neck supple.      Right lower leg: Edema present.      Left lower leg: Edema present.      Comments: 1+ pitting edema in the lower extremities bilaterally     Skin:     General: Skin is warm and dry.       Coloration: Skin is not jaundiced or pale.     Neurological:      Mental Status: He is alert and oriented to person, place, and time. Mental status is at baseline.     Psychiatric:         Mood and Affect: Mood normal.         Behavior: Behavior normal.         Thought Content: Thought content normal.         Judgment: Judgment normal.         Lab Results: I have reviewed the following results:CBC/BMP:   .     05/21/25  1511 05/21/25  1521 05/21/25  1938 05/22/25  0537   WBC  --    < >  --  10.32*   HGB 10.2*   < >  --  10.5*   HCT 30*   < >  --  33.5*   PLT  --    < >  --  172   SODIUM  --    < >  --  143   K  --    < >  --  2.2*   CL  --    < >  --  105   CO2 33*   < >  --  28   BUN  --    < >  --  10   CREATININE  --    < >  --  0.87   GLUC  --    < >  --  153*   CAIONIZED 0.85*  --   --   --    MG  --   --   --  1.7*   PHOS  --   --  2.7  --     < > = values in this interval not displayed.    , Creatinine Clearance: CrCl cannot be calculated (Unknown ideal weight.)., LFTs:   .     05/21/25  1521   AST 34   ALT 13   ALB 3.4*   TBILI 0.91   ALKPHOS 136*    , TSH:       Imaging Results Review: I reviewed radiology reports from this admission including: CT chest, CT abdomen/pelvis, CT head, and CT C-spine.  Other Study Results Review: No additional pertinent studies reviewed.           [1]   Past Medical History:  Diagnosis Date    Stroke (HCC)    [2]   Past Surgical History:  Procedure Laterality Date    CARPAL TUNNEL RELEASE     [3]   Social History  Tobacco Use    Smoking status: Never    Smokeless tobacco: Never   Substance and Sexual Activity    Alcohol use: Never    Drug use: Never   [4]   Family History  Problem Relation Name Age of Onset    Heart disease Mother     [5]   Social History  Tobacco Use    Smoking status: Never    Smokeless tobacco: Never   Substance and Sexual Activity    Alcohol use: Never    Drug use: Never

## 2025-05-22 NOTE — ASSESSMENT & PLAN NOTE
SIRS present on admission  RSV/influenza/COVID-negative  Check blood cultures  CT chest abdomen pelvis no acute intrathoracic intra-abdominal findings  Monitor counts, temperatures

## 2025-05-22 NOTE — ASSESSMENT & PLAN NOTE
Lab Results   Component Value Date    HGBA1C 6.9 (H) 05/21/2025       Recent Labs     05/22/25  0000 05/22/25  0209 05/22/25  0839 05/22/25  1112   POCGLU 68 78 111 167*     Blood Sugar Average: Last 72 hrs:  (P) 88.6  Type 1 diabetes mellitus with hyper and hypoglycemia  To have brittle blood sugars, previously admitted with hypoglycemia and known to endocrinology service  At home he takes Toujeo 12 units nightly, Humalog 4 units 3 times daily before meal, correctional scale insulin 1 unit for every 50 mg over 150  Currently has sliding scale insulin algorithm 3 before meals and 2 at bedtime  Lantus 10 units nightly currently on hold.  Given patient with type 1 diabetes, advised against holding basal insulin.    Plan:  Recommend Lantus 5 units nightly tonight  Continue sliding scale insulin algorithm 3 before meals and 2 at bedtime  Continue Accu-Cheks before meals and at bedtime  Encourage p.o. intake.  Wean D5LR as tolerated  Goal blood sugar while in the [id number] mg/dL  Endocrinology will continue following

## 2025-05-22 NOTE — PROGRESS NOTES
Progress Note - Hospitalist   Name: Karson You 70 y.o. male I MRN: [id number]  Unit/Bed#: [facility] 815-01 I Date of Admission: 5/21/2025   Date of Service: 5/22/2025 I Hospital Day: 0     Assessment & Plan  Syncope and collapse  Patient presents syncope and collapse noted to have hypoglycemia with glucose of 40  Likely multifactorial  He has history of adrenal insufficiency and history of orthostatic hypotension  2D echo EF 64% grade 2 diastolic dysfunction, aortic valve sclerosis reported  Continue IV fluid hydration  Monitor orthostatics  Monitor    Paroxysmal A-fib (formerly [facility])  Continue Eliquis for anticoagulation  Adrenal insufficiency (formerly [facility])  History of renal insufficiency  Discussed with endocrinology  He is now on hydrocortisone 40 mg a.m. 30 mg p.m.  Endocrinology following   Hypoglycemia  Present on admission glucose of 40  Presently receiving IV fluids  Encourage p.o. intake  Hydrocortisone dose increased to 40 mg a.m., 30 mg p.m.  Endocrinology inputs noted  Orthostatic hypotension  History of orthostatic hypotension  Continue midodrine  Monitor blood pressures  Avoid hypotension  History of cerebrovascular accident (CVA) in adulthood  Continue aspirin, atorvastatin  Iron deficiency anemia  Monitor hemoglobin    Recent Labs     05/21/25  1511 05/21/25  1521 05/22/25  0537   HGB 10.2* 9.3* 10.5*       CKD (chronic kidney disease) stage 5, GFR less than 15 ml/min (formerly [facility])  Lab Results   Component Value Date    EGFR 88 05/22/2025    EGFR 68 05/21/2025    CREATININE 0.87 05/22/2025    CREATININE 1.10 05/21/2025   Baseline creatinine 0.9-1.1  Monitor kidney function  Avoid nephrotoxins  SIRS (systemic inflammatory response syndrome) (formerly [facility])  SIRS present on admission  RSV/influenza/COVID-negative  Check blood cultures  CT chest abdomen pelvis no acute intrathoracic intra-abdominal findings  Monitor counts, temperatures    Chronic heart failure with preserved ejection fraction (formerly [facility])  Wt Readings from Last 3 Encounters:    05/22/25 87.1 kg (192 lb)     Prior to admission on 20 mg lasix daily  Presently Lasix on hold  2D echo reveals EF 64%, grade 2 diastolic dysfunction  Low-salt diet      PAD (peripheral artery disease) (HCC)  Continue aspirin, atorvastatin  DISH (diffuse idiopathic skeletal hyperostosis)  Noted on admission CT abdomen pelvis as part of trauma workup    Also with known osteoporosis.  Follows with endocrine in the outpatient setting with [place].  Status post left ORIF for osteoporotic fracture from standing height at home in February 2025.    Plan:  Vitamin D, calcium supplementation in the setting of known vitamin D deficiency  Endocrinology inputs noted  Hypocalcemia  Noted on admission with corrected level of 7.5.  Likely in the setting of CKD 5, as well as vitamin D deficiency.  Monitor  Hypokalemia  Replete  Monitor    Type 1 diabetes mellitus ([facility])  Lab Results   Component Value Date    HGBA1C 6.9 (H) 05/21/2025       Recent Labs     05/22/25  0209 05/22/25  0839 05/22/25  1112 05/22/25  1650   POCGLU 78 111 167* 201*       Blood Sugar Average: Last 72 hrs:  (P) 98.2612295877285840    Accu-Cheks reviewed  Now on Lantus 5 units daily  Monitor Accu-Cheks  Out of glycemia  Hypomagnesemia  Replete  Monitor            VTE Pharmacologic Prophylaxis:   Moderate Risk (Score 3-4) - Pharmacological DVT Prophylaxis Ordered: apixaban (Eliquis).    Mobility:   Basic Mobility Inpatient Raw Score: 18  JH-HLM Goal: 6: Walk 10 steps or more  JH-HLM Achieved: 2: Bed activities/Dependent transfer  JH-HLM Goal NOT achieved. Continue with multidisciplinary rounding and encourage appropriate mobility to improve upon JH-HLM goals.    Patient Centered Rounds: I performed bedside rounds with nursing staff today.   Discussions with Specialists or Other Care Team Provider: Endocrinology, case management    Education and Discussions with Family / Patient: patient, updated daughter Ofelia in detail, questions answered .      Current Length of Stay: 0 day(s)  Current Patient Status: Inpatient   Certification Statement: The patient will continue to require additional inpatient hospital stay due to hypoglycemia, syncope and collapse for management as outlined, requires close orthostatics monitoring IV fluid hydration Endocrinology evaluation  Discharge Plan: Awaiting clinical symptomatic improvement    Code Status: Level 1 - Full Code    Subjective     Comfortably in bed.  Reports feeling slightly better today  History chart labs medications reviewed      Objective :  Temp:  [98 °F (36.7 °C)-101.3 °F (38.5 °C)] 99.7 °F (37.6 °C)  HR:  [64-99] 78  BP: (127-180)/(62-95) 160/62  Resp:  [16-23] 16  SpO2:  [87 %-98 %] 95 %  O2 Device: None (Room air)    There is no height or weight on file to calculate BMI.     Input and Output Summary (last 24 hours):     Intake/Output Summary (Last 24 hours) at 5/22/2025 1744  Last data filed at 5/22/2025 1408  Gross per 24 hour   Intake 2264.25 ml   Output 1710 ml   Net 554.25 ml       Physical Exam      Vitals:    05/22/25 1113 05/22/25 1425 05/22/25 1455 05/22/25 1604   BP: 127/78 160/62     Pulse: 87 77 99 78   Patient Position - Orthostatic VS:            Comfortably in bed  Neck supple  Lungs diminished breath sounds  Heart sounds S1-S2 noted  Abdomen soft  Awake follows commands  No rash    Lines/Drains:        Telemetry:  Telemetry Orders (From admission, onward)               24 Hour Telemetry Monitoring  Continuous x 24 Hours (Telem)        Expiring   Question:  Reason for 24 Hour Telemetry  Answer:  Metabolic/electrolyte disturbance with high probability of dysrhythmia. K level <3 or >6 OR KCL infusion >10mEq/hr                     Telemetry Reviewed: Sinus rhythm  Indication for Continued Telemetry Use: Syncope               Lab Results: I have reviewed the following results:   Results from last 7 days   Lab Units 05/22/25  0537   WBC Thousand/uL 10.32*   HEMOGLOBIN g/dL 10.5*   HEMATOCRIT %  33.5*   PLATELETS Thousands/uL 172   SEGS PCT % 74   LYMPHO PCT % 12*   MONO PCT % 11   EOS PCT % 2     Results from last 7 days   Lab Units 05/22/25  0537 05/21/25  1521   SODIUM mmol/L 143 138   POTASSIUM mmol/L 2.2* 2.8*   CHLORIDE mmol/L 105 101   CO2 mmol/L 28 28   BUN mg/dL 10 11   CREATININE mg/dL 0.87 1.10   ANION GAP mmol/L 10 9   CALCIUM mg/dL 7.7* 7.0*   ALBUMIN g/dL  --  3.4*   TOTAL BILIRUBIN mg/dL  --  0.91   ALK PHOS U/L  --  136*   ALT U/L  --  13   AST U/L  --  34   GLUCOSE RANDOM mg/dL 153* 40*     Results from last 7 days   Lab Units 05/21/25  1521   INR  1.63*     Results from last 7 days   Lab Units 05/22/25  1650 05/22/25  1112 05/22/25  0839 05/22/25  0209 05/22/25  0000 05/21/25  2228 05/21/25  2120 05/21/25  2003 05/21/25  1756 05/21/25  1638 05/21/25  1543   POC GLUCOSE mg/dl 201* 167* 111 78 68 72 70 68 68 92 92     Results from last 7 days   Lab Units 05/21/25  1521   HEMOGLOBIN A1C % 6.9*     Results from last 7 days   Lab Units 05/21/25  1938   LACTIC ACID mmol/L 0.8   PROCALCITONIN ng/ml 0.43*       Recent Cultures (last 7 days):         Imaging Results Review: I personally reviewed the following image studies/reports in PACS and discussed pertinent findings with Radiology: CT chest, CT abdomen/pelvis, CT head, and CT C-spine. My interpretation of the radiology images/reports is: Lab results reviewed.      Last 24 Hours Medication List:     Current Facility-Administered Medications:     acetaminophen (TYLENOL) tablet 650 mg, Q6H PRN    aluminum-magnesium hydroxide-simethicone (MAALOX) oral suspension 30 mL, Q6H PRN    apixaban (ELIQUIS) tablet 5 mg, BID    aspirin chewable tablet 81 mg, Daily    atorvastatin (LIPITOR) tablet 10 mg, Daily With Dinner    Cholecalciferol (VITAMIN D3) tablet 1,000 Units, Daily    dextrose 5 % in lactated Ringer's infusion, Continuous, Last Rate: 50 mL/hr (05/22/25 1730)    fludrocortisone (FLORINEF) tablet 0.2 mg, Daily    [START ON 5/23/2025]  hydrocortisone (CORTEF) tablet 40 mg, QAM **AND** [START ON 5/23/2025] hydrocortisone (CORTEF) tablet 30 mg, Daily With Lunch    insulin glargine (LANTUS) subcutaneous injection 5 Units 0.05 mL, HS    insulin lispro (HumALOG/ADMELOG) 100 units/mL subcutaneous injection 1-5 Units, HS    insulin lispro (HumALOG/ADMELOG) 100 units/mL subcutaneous injection 1-6 Units, TID AC **AND** Fingerstick Glucose (POCT), TID AC    midodrine (PROAMATINE) tablet 5 mg, TID AC    ondansetron (ZOFRAN) injection 4 mg, Q6H PRN    senna (SENOKOT) tablet 8.6 mg, Daily    sertraline (ZOLOFT) tablet 100 mg, Daily    Administrative Statements   Today, Patient Was Seen By: Miguelito Martinez MD  I have spent a total time of 32 minutes in caring for this patient on the day of the visit/encounter including Diagnostic results, Risks and benefits of tx options, Instructions for management, Patient and family education, Importance of tx compliance, Risk factor reductions, Impressions, Counseling / Coordination of care, Documenting in the medical record, Reviewing/placing orders in the medical record (including tests, medications, and/or procedures), Obtaining or reviewing history  , and Communicating with other healthcare professionals .    **Please Note: This note may have been constructed using a voice recognition system.**

## 2025-05-22 NOTE — ASSESSMENT & PLAN NOTE
At home takes hydrocortisone 20 mg in the morning, 15 mg in the afternoon  Recommend increasing to 40 mg in the morning and 30 mg in the afternoon  Wean hydrocortisone as tolerated back to home dose

## 2025-05-22 NOTE — ASSESSMENT & PLAN NOTE
Patient presents syncope and collapse noted to have hypoglycemia with glucose of 40  Likely multifactorial  He has history of adrenal insufficiency and history of orthostatic hypotension  2D echo EF 64% grade 2 diastolic dysfunction, aortic valve sclerosis reported  Continue IV fluid hydration  Monitor orthostatics  Monitor

## 2025-05-22 NOTE — ASSESSMENT & PLAN NOTE
Lab Results   Component Value Date    EGFR 88 05/22/2025    EGFR 68 05/21/2025    CREATININE 0.87 05/22/2025    CREATININE 1.10 05/21/2025   Baseline creatinine 0.9-1.1  Monitor kidney function  Avoid nephrotoxins

## 2025-05-22 NOTE — PROGRESS NOTES
Tertiary Trauma Note  - Trauma   Name: Karson You 70 y.o. male I MRN: [id number]  Unit/Bed#: ED 06 I Date of Admission: 5/21/2025   Date of Service: 5/22/2025 I Hospital Day: 0    Assessment & Plan  Fall, initial encounter  S/p fall following syncopal event   Imaging negative for acute traumatic injuries   Syncope  Admitted to medicine for further management       TRAUMA TERTIARY SURVEY  Summary of Diagnosed Injuries: No traumatic injuries     Transfer from: N/A    Mechanism of Injury:Fall      24 Hour Events : No acute events overnight  Subjective : Patient slept well overnight. He denies pain. He was able to eat dinner. No nausea or vomiting.     Objective :  Temp:  [99.2 °F (37.3 °C)] 99.2 °F (37.3 °C)  HR:  [64-80] 64  BP: (101-156)/(53-72) 156/72  Resp:  [16-23] 16  SpO2:  [92 %-97 %] 97 %  O2 Device: None (Room air)    I/O         05/20 0701  05/21 0700 05/21 0701  05/22 0700    IV Piggyback  151.3    Total Intake(mL/kg)  151.3 (1.8)    Net  +151.3                  Physical Exam  Vitals and nursing note reviewed.   HENT:      Head: Normocephalic and atraumatic.      Mouth/Throat:      Mouth: Mucous membranes are moist.     Eyes:      Extraocular Movements: Extraocular movements intact.      Pupils: Pupils are equal, round, and reactive to light.       Cardiovascular:      Rate and Rhythm: Normal rate. Rhythm irregular.      Pulses: Normal pulses.   Pulmonary:      Effort: Pulmonary effort is normal. No respiratory distress.      Comments: Diminished breath sounds at bases bilaterally.   Abdominal:      Palpations: Abdomen is soft.      Tenderness: There is no abdominal tenderness. There is no guarding or rebound.     Musculoskeletal:         General: Normal range of motion.      Cervical back: Normal range of motion. No tenderness.      Right lower leg: No edema.      Left lower leg: No edema.     Skin:     General: Skin is warm and dry.      Capillary Refill: Capillary refill takes less than 2 seconds.      Neurological:      Mental Status: He is alert and oriented to person, place, and time.      Sensory: No sensory deficit.      Motor: No weakness.                  Lab Results: I have reviewed the following results:  Recent Labs     05/21/25  1511 05/21/25  1521 05/21/25  1548 05/21/25  1800 05/21/25  1938   WBC  --  13.92*  --   --   --    HGB 10.2* 9.3*  --   --   --    HCT 30* 29.6*  --   --   --    PLT  --  186  --   --   --    SODIUM  --  138  --   --   --    K  --  2.8*  --   --   --    CL  --  101  --   --   --    CO2 33* 28  --   --   --    BUN  --  11  --   --   --    CREATININE  --  1.10  --   --   --    GLUC  --  40*  --   --   --    CAIONIZED 0.85*  --   --   --   --    AST  --  34  --   --   --    ALT  --  13  --   --   --    ALB  --  3.4*  --   --   --    TBILI  --  0.91  --   --   --    ALKPHOS  --  136*  --   --   --    PTT  --  37*  --   --   --    INR  --  1.63*  --   --   --    HSTNI0  --   --  93*  --   --    HSTNI2  --   --   --  96*  --    LACTICACID  --   --   --   --  0.8

## 2025-05-22 NOTE — ASSESSMENT & PLAN NOTE
Monitor hemoglobin    Recent Labs     05/21/25  1511 05/21/25  1521 05/22/25  0537   HGB 10.2* 9.3* 10.5*

## 2025-05-22 NOTE — PLAN OF CARE
Problem: PAIN - ADULT  Goal: Verbalizes/displays adequate comfort level or baseline comfort level  Description: Interventions:  - Encourage patient to monitor pain and request assistance  - Assess pain using appropriate pain scale  - Administer analgesics as ordered based on type and severity of pain and evaluate response  - Implement non-pharmacological measures as appropriate and evaluate response  - Consider cultural and social influences on pain and pain management  - Notify physician/advanced practitioner if interventions unsuccessful or patient reports new pain  - Educate patient/family on pain management process including their role and importance of  reporting pain   - Provide non-pharmacologic/complimentary pain relief interventions  Outcome: Progressing     Problem: INFECTION - ADULT  Goal: Absence or prevention of progression during hospitalization  Description: INTERVENTIONS:  - Assess and monitor for signs and symptoms of infection  - Monitor lab/diagnostic results  - Monitor all insertion sites, i.e. indwelling lines, tubes, and drains  - Monitor endotracheal if appropriate and nasal secretions for changes in amount and color  - [place] appropriate cooling/warming therapies per order  - Administer medications as ordered  - Instruct and encourage patient and family to use good hand hygiene technique  - Identify and instruct in appropriate isolation precautions for identified infection/condition  Outcome: Progressing  Goal: Absence of fever/infection during neutropenic period  Description: INTERVENTIONS:  - Monitor WBC  - Perform strict hand hygiene  - Limit to healthy visitors only  - No plants, dried, fresh or silk flowers with gillis in patient room  Outcome: Completed     Problem: SAFETY ADULT  Goal: Patient will remain free of falls  Description: INTERVENTIONS:  - Educate patient/family on patient safety including physical limitations  - Instruct patient to call for assistance with activity   -  Consider consulting OT/PT to assist with strengthening/mobility based on AM PAC & JH-HLM score  - Consult OT/PT to assist with strengthening/mobility   - Keep Call bell within reach  - Keep bed low and locked with side rails adjusted as appropriate  - Keep care items and personal belongings within reach  - Initiate and maintain comfort rounds  - Make Fall Risk Sign visible to staff  - Offer Toileting every 4 Hours, in advance of need  - Initiate/Maintain fall alarm  - Obtain necessary fall risk management equipment  - Apply yellow socks and bracelet for high fall risk patients  - Consider moving patient to room near nurses station  Outcome: Progressing  Goal: Maintain or return to baseline ADL function  Description: INTERVENTIONS:  -  Assess patient's ability to carry out ADLs; assess patient's baseline for ADL function and identify physical deficits which impact ability to perform ADLs (bathing, care of mouth/teeth, toileting, grooming, dressing, etc.)  - Assess/evaluate cause of self-care deficits   - Assess range of motion  - Assess patient's mobility; develop plan if impaired  - Assess patient's need for assistive devices and provide as appropriate  - Encourage maximum independence but intervene and supervise when necessary  - Involve family in performance of ADLs  - Assess for home care needs following discharge   - Consider OT consult to assist with ADL evaluation and planning for discharge  - Provide patient education as appropriate  - Monitor functional capacity and physical performance, use of AM PAC & JH-HLM   - Monitor gait, balance and fatigue with ambulation    Outcome: Progressing  Goal: Maintains/Returns to pre admission functional level  Description: INTERVENTIONS:  - Perform AM-PAC 6 Click Basic Mobility/ Daily Activity assessment daily.  - Set and communicate daily mobility goal to care team and patient/family/caregiver.   - Collaborate with rehabilitation services on mobility goals if consulted  -  Perform Range of Motion 2 times a day.  - Reposition patient every 2 hours.  - Dangle patient 2 times a day  - Stand patient 2 times a day  - Ambulate patient 2 times a day  - Out of bed to chair 2 times a day   - Out of bed for meals 2 times a day  - Out of bed for toileting  - Record patient progress and toleration of activity level   Outcome: Progressing     Problem: DISCHARGE PLANNING  Goal: Discharge to home or other facility with appropriate resources  Description: INTERVENTIONS:  - Identify barriers to discharge w/patient and caregiver  - Arrange for needed discharge resources and transportation as appropriate  - Identify discharge learning needs (meds, wound care, etc.)  - Arrange for interpretive services to assist at discharge as needed  - Refer to Case Management Department for coordinating discharge planning if the patient needs post-hospital services based on physician/advanced practitioner order or complex needs related to functional status, cognitive ability, or social support system  Outcome: Progressing     Problem: Knowledge Deficit  Goal: Patient/family/caregiver demonstrates understanding of disease process, treatment plan, medications, and discharge instructions  Description: Complete learning assessment and assess knowledge base.  Interventions:  - Provide teaching at level of understanding  - Provide teaching via preferred learning methods  Outcome: Progressing     Problem: Prexisting or High Potential for Compromised Skin Integrity  Goal: Skin integrity is maintained or improved  Description: INTERVENTIONS:  - Identify patients at risk for skin breakdown  - Assess and monitor skin integrity including under and around medical devices   - Assess and monitor nutrition and hydration status  - Monitor labs  - Assess for incontinence   - Turn and reposition patient  - Assist with mobility/ambulation  - Relieve pressure over rea prominences   - Avoid friction and shearing  - Provide appropriate  hygiene as needed including keeping skin clean and dry  - Evaluate need for skin moisturizer/barrier cream  - Collaborate with interdisciplinary team  - Patient/family teaching  - Consider wound care consult    Assess:  - Review Mode scale daily  - Clean and moisturize skin every day  - Inspect skin when repositioning, toileting, and assisting with ADLS  - Assess under medical devices such as masimo every shift  - Assess extremities for adequate circulation and sensation     Bed Management:  - Have minimal linens on bed & keep smooth, unwrinkled  - Change linens as needed when moist or perspiring  - Avoid sitting or lying in one position for more than 2 hours while in bed?Keep HOB at 30 degrees   - Toileting:  - Offer bedside commode  - Assess for incontinence every 4 hours  - Use incontinent care products after each incontinent episode.    Activity:  - Mobilize patient 2 times a day  - Encourage activity and walks on unit  - Encourage or provide ROM exercises   - Turn and reposition patient every 2 Hours  - Use appropriate equipment to lift or move patient in bed  - Instruct/ Assist with weight shifting every 2 hrs when out of bed in chair  - Consider limitation of chair time 2 hour intervals    Skin Care:  - Avoid use of baby powder, tape, friction and shearing, hot water or constrictive clothing  - Relieve pressure over bony prominences using cushion  - Do not massage red bony areas    Next Steps:  - Teach patient strategies to minimize risks such as repositioning  - Consider consults to  interdisciplinary teams such as P/T  Outcome: Progressing

## 2025-05-22 NOTE — ASSESSMENT & PLAN NOTE
In the setting of hypoglycemia as evidenced by blood sugar of 40 mg/dL on BMP on admission.   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  Recommend increasing hydrocortisone to 40 mg in the morning and 30 mg in the evening, which will be double his home dose.  Encourage p.o. intake  Wean off of 5% dextrose LR as tolerated  Continue monitoring vital signs and blood sugars  Correct electrolyte abnormalities  Rest of plan as below

## 2025-05-22 NOTE — ASSESSMENT & PLAN NOTE
Lab Results   Component Value Date    HGBA1C 6.9 (H) 05/21/2025       Recent Labs     05/22/25  0209 05/22/25  0839 05/22/25  1112 05/22/25  1650   POCGLU 78 111 167* 201*       Blood Sugar Average: Last 72 hrs:  (P) [id number]    Accu-Cheks reviewed  Now on Lantus 5 units daily  Monitor Accu-Cheks  Out of glycemia

## 2025-05-22 NOTE — ASSESSMENT & PLAN NOTE
History of orthostatic hypotension  Continue midodrine  Monitor blood pressures  Avoid hypotension

## 2025-05-22 NOTE — UTILIZATION REVIEW
Initial Clinical Review    Admission: Date/Time/Statement:   Admission Orders (From admission, onward)       Ordered        05/22/25 1644  INPATIENT ADMISSION  Once            05/21/25 1756  Place in Observation  Once                          Orders Placed This Encounter   Procedures    Place in Observation     Standing Status:   Standing     Number of Occurrences:   1     Level of Care:   Med Surg [16]              tele    INPATIENT ADMISSION     Standing Status:   Standing     Number of Occurrences:   1     Level of Care:   Med Surg [16]     Estimated length of stay:   More than 2 Midnights     Certification:   I certify that inpatient services are medically necessary for this patient for a duration of greater than two midnights. See H&P and MD Progress Notes for additional information about the patient's course of treatment.     ED Arrival Information       Expected   -    Arrival   5/21/2025 14:54    Acuity   Emergent              Means of arrival   Ambulance    Escorted by   [facility]    Service   Hospitalist    Admission type   Emergency              Arrival complaint   -             Chief Complaint   Patient presents with    Trauma     See narrator     PMH  T1DM, adrenal insufficiency, PAF on Eliquis, HFpEF, CKD 5 orthostatic hypotension, iron deficiency anemia, prolonged QT, history of CVA, PAD on aspirin, morbid obesity, vitamin D deficiency, osteoporosis s/p L ORIF in 2/2025.  prior syncopal episode  in December 2023 suspected to be due to orthostatic hypotension at the time.    Initial Presentation: 70 y.o. male  to ER via EMS :  pt's  girlfriend called EMS after she supposedly witnessed patient fall while ambulating.  Pt unclear is head strike or not but  thinks unconscious for unclear amt of time - does not remember much since episode.   wet cough for about 2 days:   He has not noticed himself feeling particularly worse than usual to warrant increasing his Florinef or Cortef for stress  dosing.  IN ER,  BMP showed glucose of 40,  low calcium corrected at 7.5 and potassium of 2.8  (rx w/D50)  Leukocytosis of 13.9, hemoglobin 9.3, platelets 186.  Hgb A1C 6.9        I-STAT showed pH 7.51/39/29/32 suggestive of metabolic alkalosis.     CTAP   showed b/l  pleural effusions.  CXR  w/some mild pulmonary congestion and increased bowel gas pattern without obstruction.    5/21    Anticipated Length of Stay/Certification Statement:  Patient will be admitted on an observation basis/ MS Level of care    with an anticipated length of stay of less than 2 midnights secondary to syncope workup, suspected to be related to adrenal insufficiency w/orthostatic hypotension  in the setting of possible URI/pneumonia/ hypoglycemia:   necessitating stress dose steroid dosing, with brittle diabetes likely contributing.     Cont telemetry.   Monitor/replete prn electrolytes.  PT/OT eval and treat.  I/O q shift.  Routine VS.  SCD's b/l LE.  Accucks qid w/SSI.  Consult Endocrine     Additional 10 mg cortef now.  Start low rate 50 mL/hr D5LR to prevent overnight hypoglycemia.  Double home Cortef from 15/10 mg in AM/afternoon to 30/20 mg in AM/afternoon respectively for stress dosing.   Encourage p.o. intake.  No fluid restriction for now given he also has orthostatic hypotension.  Hold home Lasix.  Continue home midodrine 5 mg 3 times daily.  Check orthostatic vitals  Follow-up echo to check for acute CHF as well as any aortic valve changes    Date: 5/22     Day 2:   CHANGED TO INPATIENT  STATUS,  MS  Level of Care for   ongoing  monitoring of labs, blood sugar, VS.  Tmax 101.  EXAM:  coarse rhonchi b/l.  B/l LE pitting edema .  mg 1.7,  bnp 391,  wbc down to 10.3   5/22    ENDOCRINE Consult:   Recommend Lantus 5 units nightly tonight.  Cont SSI.  Enc PO intake - wean IVF.           ED Treatment-Medication Administration from 05/21/2025 3797 to 05/22/2025 8685         Date/Time Order Dose Route Action     05/21/2025 8417  dextrose 50 % IV solution 50 mL Intravenous Given     05/21/2025 1753 potassium chloride (Klor-Con M20) CR tablet 60 mEq 60 mEq Oral Given     05/21/2025 1804 dextrose 5 % in lactated Ringer's infusion 50 mL/hr Intravenous New Bag     05/21/2025 2234 fludrocortisone (FLORINEF) tablet 0.2 mg 0.2 mg Oral Given     05/21/2025 1838 apixaban (ELIQUIS) tablet 5 mg 5 mg Oral Given     05/21/2025 1838 atorvastatin (LIPITOR) tablet 10 mg 10 mg Oral Given     05/21/2025 2234 hydrocortisone (CORTEF) tablet 10 mg 10 mg Oral Given     05/21/2025 1838 midodrine (PROAMATINE) tablet 5 mg 5 mg Oral Given     05/21/2025 1839 potassium chloride (Klor-Con M20) CR tablet 40 mEq 40 mEq Oral Given     05/21/2025 1839 magnesium sulfate 2 g/50 mL IVPB (premix) 2 g 2 g Intravenous New Bag     05/21/2025 1943 calcium gluconate 2 g in sodium chloride 0.9% 100 mL (premix) 2 g Intravenous New Bag            Scheduled Medications:  apixaban, 5 mg, Oral, BID  aspirin, 81 mg, Oral, Daily  atorvastatin, 10 mg, Oral, Daily With Dinner  cholecalciferol, 1,000 Units, Oral, Daily  fludrocortisone, 0.2 mg, Oral, Daily  [START ON 5/23/2025] hydrocortisone, 40 mg, Oral, QAM   And  [START ON 5/23/2025] hydrocortisone, 30 mg, Oral, Daily With Lunch  insulin glargine, 5 Units, Subcutaneous, HS  insulin lispro, 1-5 Units, Subcutaneous, HS  insulin lispro, 1-6 Units, Subcutaneous, TID AC  midodrine, 5 mg, Oral, TID AC  senna, 1 tablet, Oral, Daily  sertraline, 100 mg, Oral, Daily      Continuous IV Infusions:  dextrose 5% lactated ringer's, 50 mL/hr, Intravenous, Continuous      PRN Meds:  acetaminophen, 650 mg, Oral, Q6H PRN  aluminum-magnesium hydroxide-simethicone, 30 mL, Oral, Q6H PRN  ondansetron, 4 mg, Intravenous, Q6H PRN      ED Triage Vitals   Temperature Pulse Respirations Blood Pressure SpO2 Pain Score   05/21/25 1456 05/21/25 1456 05/21/25 1456 05/21/25 1456 05/21/25 1456 05/22/25 0415   99.2 °F (37.3 °C) 73 18 112/54 94 % 8     Weight (last 2 days)        Date/Time Weight    05/22/25 14:25:43 87.1 (192)    05/22/25 0443 87.4 (192.68)    05/21/25 14:56:20 86.1 (189.82)            Vital Signs (last 3 days)      Date/Time Temp Pulse Resp BP SpO2 Tiny Coma Scale Score Pain   05/22/25 16:04:11 99.7 °F (37.6 °C) 78 -- -- 95 % -- --   05/22/25 14:55:20 100.1 °F (37.8 °C) 99 -- -- 95 % -- --   05/22/25 14:25:43 101.3 °F (38.5 °C) 77 16 160/62 87 % -- --   05/22/25 11:13:58 -- 87 18 127/78 95 % -- --   05/22/25 0900 -- -- -- -- 98 % 14 No Pain   05/22/25 07:21:18 98 °F (36.7 °C) 79 16 179/95 97 % -- --   05/22/25 0443 -- -- -- -- -- 14 No Pain   05/22/25 04:40:29 98.1 °F (36.7 °C) 82 20 180/93 98 % -- --   05/22/25 0415 -- 78 16 172/75 96 % -- 8   05/22/25 0215 -- 79 18 143/89 96 % -- --   05/21/25 2315 -- 64 16 156/72 -- -- --   05/21/25 1915 -- 65 18 147/65 97 % -- --   05/21/25 1800 -- 66 23 152/66 97 % -- --   05/21/25 1700 -- 65 19 131/62 92 % 15 --   05/21/25 1630 -- 65 20 128/62 95 % 15 --   05/21/25 1600 -- 69 20 117/56 94 % 15 --   05/21/25 1530 -- 72 18 119/56 96 % 15 --   05/21/25 1520 -- 68 19 -- 96 % 15 --   05/21/25 1510 -- 80 18 124/58 95 % 15 --   05/21/25 1505 -- 78 17 120/60 92 % 15 --   05/21/25 15:00:38 -- 72 18 101/53 96 % 15 --   05/21/25 14:56:20 99.2 °F (37.3 °C) 73 18 112/54 94 % 15 --              Pertinent Labs/Diagnostic Test Results:   Radiology:  TRAUMA - CT head wo contrast   Final Interpretation by E. Alec Schoenberger, MD (05/21 1622)      No acute intracranial abnormality.         TRAUMA - CT spine cervical wo contrast   Final Interpretation by E. Alec Schoenberger, MD (05/21 1622)      No cervical spine fracture or traumatic malalignment.         CT chest abdomen pelvis wo contrast   Final Interpretation by E. Alec Schoenberger, MD (05/21 1622)      Bilateral pleural effusions   No acute intrathoracic or intra-abdominal injury.      XR Trauma multiple (SLB/[facility] trauma bay ONLY)   Preliminary Result by Alina Griffiths (05/22 1400)       No acute cardiopulmonary disease within limitations of supine imaging.         XR chest 1 view   Final Interpretation by Alina Griffiths (05/22 1400)      No acute cardiopulmonary disease within limitations of supine imaging.     Cardiology:  Echo complete w/ contrast if indicated   Final Result by Johnie Sanchez MD (05/22 1525)        Left Ventricle: Left ventricular cavity size is normal. Wall thickness    is mildly increased. There is mild concentric hypertrophy. The left    ventricular ejection fraction is 64%. Systolic function is normal. Wall    motion is normal. Diastolic function is moderately abnormal, consistent    with grade II (pseudonormal) relaxation.     Left Atrium: The atrium is mildly dilated.     Aortic Valve: There is aortic valve sclerosis.     Mitral Valve: There is moderate annular calcification.           GI:  No orders to display       Results from last 7 days   Lab Units 05/21/25 1938   SARS-COV-2  Negative     Results from last 7 days   Lab Units 05/22/25 0537 05/21/25  1521 05/21/25  1511   WBC Thousand/uL 10.32* 13.92*  --    HEMOGLOBIN g/dL 10.5* 9.3*  --    I STAT HEMOGLOBIN g/dl  --   --  10.2*   HEMATOCRIT % 33.5* 29.6*  --    HEMATOCRIT, ISTAT %  --   --  30*   PLATELETS Thousands/uL 172 186  --    TOTAL NEUT ABS Thousands/µL 7.72* 10.44*  --          Results from last 7 days   Lab Units 05/22/25 0537 05/21/25 1938 05/21/25  1521 05/21/25  1511   SODIUM mmol/L 143  --  138  --    POTASSIUM mmol/L 2.2*  --  2.8*  --    CHLORIDE mmol/L 105  --  101  --    CO2 mmol/L 28  --  28  --    CO2, I-STAT mmol/L  --   --   --  33*   ANION GAP mmol/L 10  --  9  --    BUN mg/dL 10  --  11  --    CREATININE mg/dL 0.87  --  1.10  --    EGFR ml/min/1.73sq m 88  --  68  --    CALCIUM mg/dL 7.7*  --  7.0*  --    CALCIUM, IONIZED, ISTAT mmol/L  --   --   --  0.85*   MAGNESIUM mg/dL 1.7*  --   --   --    PHOSPHORUS mg/dL  --  2.7  --   --      Results from last 7 days   Lab Units 05/21/25  0035  "  AST U/L 34   ALT U/L 13   ALK PHOS U/L 136*   TOTAL PROTEIN g/dL 6.0*   ALBUMIN g/dL 3.4*   TOTAL BILIRUBIN mg/dL 0.91     Results from last 7 days   Lab Units 05/22/25  1112 05/22/25  0839 05/22/25  0209 05/22/25  0000 05/21/25  2228 05/21/25  2120 05/21/25  2003 05/21/25  1756 05/21/25  1638 05/21/25  1543   POC GLUCOSE mg/dl 167* 111 78 68 72 70 68 68 92 92     Results from last 7 days   Lab Units 05/22/25  0537 05/21/25  1521   GLUCOSE RANDOM mg/dL 153* 40*         Results from last 7 days   Lab Units 05/21/25  1521   HEMOGLOBIN A1C % 6.9*   EAG mg/dl 151     No results found for: \"BETA-HYDROXYBUTYRATE\"           Results from last 7 days   Lab Units 05/21/25  1511   PH, SILVIO I-STAT  7.518*   PCO2, SILVIO ISTAT mm HG 39.1*   PO2, SILVIO ISTAT mm HG 29.0*   HCO3, SILVIO ISTAT mmol/L 31.7*   I STAT BASE EXC mmol/L 8*   I STAT O2 SAT % 62     Results from last 7 days   Lab Units 05/21/25  1521   CK TOTAL U/L 133     Results from last 7 days   Lab Units 05/21/25  1938 05/21/25  1800 05/21/25  1548   HS TNI 0HR ng/L  --   --  93*   HS TNI 2HR ng/L  --  96*  --    HSTNI D2 ng/L  --  3  --    HS TNI 4HR ng/L 99*  --   --    HSTNI D4 ng/L 6  --   --          Results from last 7 days   Lab Units 05/21/25  1521   PROTIME seconds 19.5*   INR  1.63*   PTT seconds 37*         Results from last 7 days   Lab Units 05/21/25 1938   PROCALCITONIN ng/ml 0.43*     Results from last 7 days   Lab Units 05/21/25 1938   LACTIC ACID mmol/L 0.8             Results from last 7 days   Lab Units 05/22/25  0537   BNP pg/mL 391*                                         Results from last 7 days   Lab Units 05/21/25 1938   INFLUENZA A PCR  Negative   INFLUENZA B PCR  Negative   RSV PCR  Negative                                               Past Medical History[1]  Present on Admission:   Paroxysmal A-fib (HCC)   Adrenal insufficiency (HCC)   Hypoglycemia   Syncope and collapse   Orthostatic hypotension   Iron deficiency anemia   CKD (chronic kidney " disease) stage 5, GFR less than 15 ml/min (HCC)   Chronic heart failure with preserved ejection fraction (HCC)   PAD (peripheral artery disease) (HCC)   DISH (diffuse idiopathic skeletal hyperostosis)   Hypocalcemia   SIRS (systemic inflammatory response syndrome) ([facility])      Admitting Diagnosis: Hypocalcemia [E83.51]  Adrenal insufficiency (HCC) [E27.40]  Syncope [R55]  Hypoglycemia [E16.2]  CKD (chronic kidney disease) stage 5, GFR less than 15 ml/min (HCC) [N18.5]  Chronic heart failure with preserved ejection fraction (HCC) [I50.32]  Age/Sex: 70 y.o. male    Network Utilization Review Department  ATTENTION: Please call with any questions or concerns to [phone] and carefully listen to the prompts so that you are directed to the right person. All voicemails are confidential.   For Discharge needs, contact Care Management DC Support Team at [phone] opt. [phone]  Send all requests for admission clinical reviews, approved or denied determinations and any other requests to dedicated fax number below belonging to the [place] where the patient is receiving treatment. List of dedicated fax numbers for the Facilities:  FACILITY NAME UR FAX NUMBER   ADMISSION DENIALS (Administrative/Medical Necessity) [phone]   DISCHARGE SUPPORT TEAM (NETWORK) [phone]   PARENT CHILD HEALTH (Maternity/NICU/Pediatrics) [phone]   [facility] [phone]   [facility] [phone]   [facility] [phone]   [facility] [phone]   [facility] [phone]   [facility] [phone]   [facility] [phone]   [facility] [phone]   [facility] [phone]   [facility] [phone]   [facility] [phone]   [facility]  [facility] [place] [phone]              [1]   Past Medical History:  Diagnosis Date    Stroke (HCC)

## 2025-05-23 LAB
ANION GAP SERPL CALCULATED.3IONS-SCNC: 10 MMOL/L (ref 4–13)
ANION GAP SERPL CALCULATED.3IONS-SCNC: 10 MMOL/L (ref 4–13)
ANION GAP SERPL CALCULATED.3IONS-SCNC: 9 MMOL/L (ref 4–13)
ANION GAP SERPL CALCULATED.3IONS-SCNC: 9 MMOL/L (ref 4–13)
BASOPHILS # BLD AUTO: 0.01 THOUSANDS/ÂΜL (ref 0–0.1)
BASOPHILS # BLD AUTO: 0.01 THOUSANDS/ÂΜL (ref 0–0.1)
BASOPHILS NFR BLD AUTO: 0 % (ref 0–1)
BASOPHILS NFR BLD AUTO: 0 % (ref 0–1)
BUN SERPL-MCNC: 14 MG/DL (ref 5–25)
BUN SERPL-MCNC: 14 MG/DL (ref 5–25)
BUN SERPL-MCNC: 16 MG/DL (ref 5–25)
BUN SERPL-MCNC: 16 MG/DL (ref 5–25)
CALCIUM SERPL-MCNC: 7.7 MG/DL (ref 8.4–10.2)
CALCIUM SERPL-MCNC: 7.7 MG/DL (ref 8.4–10.2)
CALCIUM SERPL-MCNC: 8 MG/DL (ref 8.4–10.2)
CALCIUM SERPL-MCNC: 8 MG/DL (ref 8.4–10.2)
CHLORIDE SERPL-SCNC: 102 MMOL/L (ref 96–108)
CHLORIDE SERPL-SCNC: 102 MMOL/L (ref 96–108)
CHLORIDE SERPL-SCNC: 98 MMOL/L (ref 96–108)
CHLORIDE SERPL-SCNC: 98 MMOL/L (ref 96–108)
CO2 SERPL-SCNC: 27 MMOL/L (ref 21–32)
CO2 SERPL-SCNC: 27 MMOL/L (ref 21–32)
CO2 SERPL-SCNC: 28 MMOL/L (ref 21–32)
CO2 SERPL-SCNC: 28 MMOL/L (ref 21–32)
CREAT SERPL-MCNC: 0.94 MG/DL (ref 0.6–1.3)
CREAT SERPL-MCNC: 0.94 MG/DL (ref 0.6–1.3)
CREAT SERPL-MCNC: 0.95 MG/DL (ref 0.6–1.3)
CREAT SERPL-MCNC: 0.95 MG/DL (ref 0.6–1.3)
EOSINOPHIL # BLD AUTO: 0.09 THOUSAND/ÂΜL (ref 0–0.61)
EOSINOPHIL # BLD AUTO: 0.09 THOUSAND/ÂΜL (ref 0–0.61)
EOSINOPHIL NFR BLD AUTO: 1 % (ref 0–6)
EOSINOPHIL NFR BLD AUTO: 1 % (ref 0–6)
ERYTHROCYTE [DISTWIDTH] IN BLOOD BY AUTOMATED COUNT: 15.6 % (ref 11.6–15.1)
ERYTHROCYTE [DISTWIDTH] IN BLOOD BY AUTOMATED COUNT: 15.6 % (ref 11.6–15.1)
GFR SERPL CREATININE-BSD FRML MDRD: 81 ML/MIN/1.73SQ M
GFR SERPL CREATININE-BSD FRML MDRD: 81 ML/MIN/1.73SQ M
GFR SERPL CREATININE-BSD FRML MDRD: 82 ML/MIN/1.73SQ M
GFR SERPL CREATININE-BSD FRML MDRD: 82 ML/MIN/1.73SQ M
GLUCOSE SERPL-MCNC: 226 MG/DL (ref 65–140)
GLUCOSE SERPL-MCNC: 226 MG/DL (ref 65–140)
GLUCOSE SERPL-MCNC: 243 MG/DL (ref 65–140)
GLUCOSE SERPL-MCNC: 243 MG/DL (ref 65–140)
GLUCOSE SERPL-MCNC: 341 MG/DL (ref 65–140)
GLUCOSE SERPL-MCNC: 341 MG/DL (ref 65–140)
GLUCOSE SERPL-MCNC: 350 MG/DL (ref 65–140)
GLUCOSE SERPL-MCNC: 350 MG/DL (ref 65–140)
GLUCOSE SERPL-MCNC: 376 MG/DL (ref 65–140)
GLUCOSE SERPL-MCNC: 376 MG/DL (ref 65–140)
GLUCOSE SERPL-MCNC: 399 MG/DL (ref 65–140)
GLUCOSE SERPL-MCNC: 399 MG/DL (ref 65–140)
HCT VFR BLD AUTO: 31.3 % (ref 36.5–49.3)
HCT VFR BLD AUTO: 31.3 % (ref 36.5–49.3)
HGB BLD-MCNC: 9.5 G/DL (ref 12–17)
HGB BLD-MCNC: 9.5 G/DL (ref 12–17)
IMM GRANULOCYTES # BLD AUTO: 0.04 THOUSAND/UL (ref 0–0.2)
IMM GRANULOCYTES # BLD AUTO: 0.04 THOUSAND/UL (ref 0–0.2)
IMM GRANULOCYTES NFR BLD AUTO: 0 % (ref 0–2)
IMM GRANULOCYTES NFR BLD AUTO: 0 % (ref 0–2)
LYMPHOCYTES # BLD AUTO: 1.28 THOUSANDS/ÂΜL (ref 0.6–4.47)
LYMPHOCYTES # BLD AUTO: 1.28 THOUSANDS/ÂΜL (ref 0.6–4.47)
LYMPHOCYTES NFR BLD AUTO: 14 % (ref 14–44)
LYMPHOCYTES NFR BLD AUTO: 14 % (ref 14–44)
MAGNESIUM SERPL-MCNC: 1.9 MG/DL (ref 1.9–2.7)
MAGNESIUM SERPL-MCNC: 1.9 MG/DL (ref 1.9–2.7)
MCH RBC QN AUTO: 27.1 PG (ref 26.8–34.3)
MCH RBC QN AUTO: 27.1 PG (ref 26.8–34.3)
MCHC RBC AUTO-ENTMCNC: 30.4 G/DL (ref 31.4–37.4)
MCHC RBC AUTO-ENTMCNC: 30.4 G/DL (ref 31.4–37.4)
MCV RBC AUTO: 89 FL (ref 82–98)
MCV RBC AUTO: 89 FL (ref 82–98)
MONOCYTES # BLD AUTO: 1.07 THOUSAND/ÂΜL (ref 0.17–1.22)
MONOCYTES # BLD AUTO: 1.07 THOUSAND/ÂΜL (ref 0.17–1.22)
MONOCYTES NFR BLD AUTO: 11 % (ref 4–12)
MONOCYTES NFR BLD AUTO: 11 % (ref 4–12)
NEUTROPHILS # BLD AUTO: 6.87 THOUSANDS/ÂΜL (ref 1.85–7.62)
NEUTROPHILS # BLD AUTO: 6.87 THOUSANDS/ÂΜL (ref 1.85–7.62)
NEUTS SEG NFR BLD AUTO: 74 % (ref 43–75)
NEUTS SEG NFR BLD AUTO: 74 % (ref 43–75)
NRBC BLD AUTO-RTO: 0 /100 WBCS
NRBC BLD AUTO-RTO: 0 /100 WBCS
PLATELET # BLD AUTO: 166 THOUSANDS/UL (ref 149–390)
PLATELET # BLD AUTO: 166 THOUSANDS/UL (ref 149–390)
PMV BLD AUTO: 11.7 FL (ref 8.9–12.7)
PMV BLD AUTO: 11.7 FL (ref 8.9–12.7)
POTASSIUM SERPL-SCNC: 2.5 MMOL/L (ref 3.5–5.3)
POTASSIUM SERPL-SCNC: 2.5 MMOL/L (ref 3.5–5.3)
POTASSIUM SERPL-SCNC: 3.1 MMOL/L (ref 3.5–5.3)
POTASSIUM SERPL-SCNC: 3.1 MMOL/L (ref 3.5–5.3)
RBC # BLD AUTO: 3.51 MILLION/UL (ref 3.88–5.62)
RBC # BLD AUTO: 3.51 MILLION/UL (ref 3.88–5.62)
SODIUM SERPL-SCNC: 136 MMOL/L (ref 135–147)
SODIUM SERPL-SCNC: 136 MMOL/L (ref 135–147)
SODIUM SERPL-SCNC: 138 MMOL/L (ref 135–147)
SODIUM SERPL-SCNC: 138 MMOL/L (ref 135–147)
WBC # BLD AUTO: 9.36 THOUSAND/UL (ref 4.31–10.16)
WBC # BLD AUTO: 9.36 THOUSAND/UL (ref 4.31–10.16)

## 2025-05-23 PROCEDURE — 80048 BASIC METABOLIC PNL TOTAL CA: CPT | Performed by: INTERNAL MEDICINE

## 2025-05-23 PROCEDURE — 85025 COMPLETE CBC W/AUTO DIFF WBC: CPT

## 2025-05-23 PROCEDURE — 99233 SBSQ HOSP IP/OBS HIGH 50: CPT | Performed by: INTERNAL MEDICINE

## 2025-05-23 PROCEDURE — 99232 SBSQ HOSP IP/OBS MODERATE 35: CPT | Performed by: INTERNAL MEDICINE

## 2025-05-23 PROCEDURE — 97167 OT EVAL HIGH COMPLEX 60 MIN: CPT

## 2025-05-23 PROCEDURE — 80048 BASIC METABOLIC PNL TOTAL CA: CPT

## 2025-05-23 PROCEDURE — 99232 SBSQ HOSP IP/OBS MODERATE 35: CPT | Performed by: STUDENT IN AN ORGANIZED HEALTH CARE EDUCATION/TRAINING PROGRAM

## 2025-05-23 PROCEDURE — 97163 PT EVAL HIGH COMPLEX 45 MIN: CPT

## 2025-05-23 PROCEDURE — 83735 ASSAY OF MAGNESIUM: CPT

## 2025-05-23 PROCEDURE — 82948 REAGENT STRIP/BLOOD GLUCOSE: CPT

## 2025-05-23 RX ORDER — POTASSIUM CHLORIDE 14.9 MG/ML
20 INJECTION INTRAVENOUS
Status: COMPLETED | OUTPATIENT
Start: 2025-05-23 | End: 2025-05-24

## 2025-05-23 RX ORDER — INSULIN GLARGINE 100 [IU]/ML
10 INJECTION, SOLUTION SUBCUTANEOUS
Status: DISCONTINUED | OUTPATIENT
Start: 2025-05-23 | End: 2025-05-26

## 2025-05-23 RX ORDER — MAGNESIUM SULFATE HEPTAHYDRATE 40 MG/ML
2 INJECTION, SOLUTION INTRAVENOUS ONCE
Status: COMPLETED | OUTPATIENT
Start: 2025-05-23 | End: 2025-05-24

## 2025-05-23 RX ORDER — INSULIN LISPRO 100 [IU]/ML
3 INJECTION, SOLUTION INTRAVENOUS; SUBCUTANEOUS
Status: DISCONTINUED | OUTPATIENT
Start: 2025-05-23 | End: 2025-05-26

## 2025-05-23 RX ORDER — HYDROCORTISONE 20 MG/1
20 TABLET ORAL
Status: DISCONTINUED | OUTPATIENT
Start: 2025-05-24 | End: 2025-05-25

## 2025-05-23 RX ADMIN — HYDROCORTISONE 40 MG: 20 TABLET ORAL at 09:29

## 2025-05-23 RX ADMIN — MIDODRINE HYDROCHLORIDE 5 MG: 5 TABLET ORAL at 05:19

## 2025-05-23 RX ADMIN — INSULIN LISPRO 3 UNITS: 100 INJECTION, SOLUTION INTRAVENOUS; SUBCUTANEOUS at 16:58

## 2025-05-23 RX ADMIN — SENNOSIDES 8.6 MG: 8.6 TABLET, FILM COATED ORAL at 09:28

## 2025-05-23 RX ADMIN — ATORVASTATIN CALCIUM 10 MG: 10 TABLET, FILM COATED ORAL at 16:57

## 2025-05-23 RX ADMIN — APIXABAN 5 MG: 5 TABLET, FILM COATED ORAL at 16:57

## 2025-05-23 RX ADMIN — Medication 1000 UNITS: at 09:28

## 2025-05-23 RX ADMIN — APIXABAN 5 MG: 5 TABLET, FILM COATED ORAL at 09:28

## 2025-05-23 RX ADMIN — ASPIRIN 81 MG CHEWABLE TABLET 81 MG: 81 TABLET CHEWABLE at 09:28

## 2025-05-23 RX ADMIN — INSULIN LISPRO 2 UNITS: 100 INJECTION, SOLUTION INTRAVENOUS; SUBCUTANEOUS at 09:30

## 2025-05-23 RX ADMIN — POTASSIUM CHLORIDE 20 MEQ: 14.9 INJECTION, SOLUTION INTRAVENOUS at 11:58

## 2025-05-23 RX ADMIN — MAGNESIUM SULFATE HEPTAHYDRATE 2 G: 40 INJECTION, SOLUTION INTRAVENOUS at 09:30

## 2025-05-23 RX ADMIN — HYDROCORTISONE 30 MG: 10 TABLET ORAL at 11:44

## 2025-05-23 RX ADMIN — INSULIN LISPRO 6 UNITS: 100 INJECTION, SOLUTION INTRAVENOUS; SUBCUTANEOUS at 16:58

## 2025-05-23 RX ADMIN — MIDODRINE HYDROCHLORIDE 5 MG: 5 TABLET ORAL at 11:41

## 2025-05-23 RX ADMIN — MIDODRINE HYDROCHLORIDE 5 MG: 5 TABLET ORAL at 16:57

## 2025-05-23 RX ADMIN — INSULIN LISPRO 5 UNITS: 100 INJECTION, SOLUTION INTRAVENOUS; SUBCUTANEOUS at 11:41

## 2025-05-23 RX ADMIN — FLUDROCORTISONE ACETATE 0.2 MG: 0.1 TABLET ORAL at 09:29

## 2025-05-23 RX ADMIN — SERTRALINE HYDROCHLORIDE 100 MG: 100 TABLET ORAL at 09:28

## 2025-05-23 RX ADMIN — INSULIN GLARGINE 10 UNITS: 100 INJECTION, SOLUTION SUBCUTANEOUS at 21:11

## 2025-05-23 RX ADMIN — INSULIN LISPRO 4 UNITS: 100 INJECTION, SOLUTION INTRAVENOUS; SUBCUTANEOUS at 21:14

## 2025-05-23 RX ADMIN — POTASSIUM CHLORIDE 20 MEQ: 14.9 INJECTION, SOLUTION INTRAVENOUS at 09:53

## 2025-05-23 NOTE — ASSESSMENT & PLAN NOTE
Lab Results   Component Value Date    HGBA1C 6.9 (H) 05/21/2025       Recent Labs     05/22/25  1650 05/22/25  2159 05/23/25  0737 05/23/25  1130   POCGLU 201* 281* 226* 341*       Blood Sugar Average: Last 72 hrs:  (P) [id number]    Lantus manage daily  Monitor Accu-Cheks  Endocrinology following  Avoid hypoglycemia

## 2025-05-23 NOTE — PROGRESS NOTES
Progress Note - Geriatric Medicine   Karson You 70 y.o. male MRN: [id number]  Unit/Bed#: [facility] 815-01 Encounter: [id number]      Assessment/Plan:    Syncope and collapse   -witnessed event with reported brief loss of consciousness   -cleared by Trauma for admission to medical service  -suspected multifactorial including orthostatic component, hypoglycemia and electrolyte derangements with in patient with underlying adrenal insufficiency, orthostatic hypotension and recurrent similar episodes  -continue optimization of hemodynamics  -continue correction of electrolyte derangements [hypokalemia] and monitor for hyponatremia vicki with chronic daily use of sertraline   -continue compression stockings and abdominal binder as directed   -encourage good body mechanics, consider checking orthostatics   -Echo and syncope workup now complete, EF 64% with grade II diastolic dysfunction with aortic valve sclerosis, monitoring on telemetry      Paroxysmal A-fib  -home regimen includes a/c with Eliquis  -appears to be rate controlled non-pharmacologically    -follows regularly with Cardiology, last seen 5/6/25, continue close outpatient follow-up for ongoing monitoring and management     Orthostatic hypotension  -maintained on midodrine and florinef chronically as o/p  -Continue optimization of hemodynamics and good body mechanics   -home lasix temporarily on hold 2/2 syncope as above, monitor volume status, electrolytes, and daily weights closely     Adrenal insufficiency  -Maintained on Florinef and Cortef chronically as outpatient currently on sick day dosing being weaned back to home dosing by Endo  -Follows regularly with Endocrinology as outpatient, continue close o/p f/u for ongoing monitoring and management      Chronic heart failure with preserved ejection fraction  -EF 60% on last echo 12/2023, 64% on repeat obtained during current admission    -BNP slightly elevated at 391 on admission with pleural effusions noted on CT  chest, also with LE edema concerning for volume overload however home lasix on hold 2/2 syncope as above, continue to monitor electrolytes, daily weights, volume and resp status closely, would have low threshold to resume diuresis as long as BP remains stable   -Continue close o/p patient follow-up with PCP and Cardiology for ongoing monitoring and management     Hypokalemia  -[K] low at 2.5 this morning  -Multifactorial including frequent Lasix use as outpatient and volume overload on admission   -Continue repletion, recheck with am labs  -monitoring on telemetry      DM-I  -A1c well-controlled at 6.9 however patient noted to be brittle diabetic maintained on basal bolus regimen as o/p   -continue insulin titration to goal glu 140-180 during hospitalization to reduce risk hypoglycemia  -Continue to encourage healthy lifestyle choices/modifications and diabetic diet  -Continue close outpatient follow-up with PCP and Endo for ongoing age-appropriate hepatic screenings and cares     Cognitive screening  -Alert and oriented to person place and general situation  -Reportedly requires some assist with ADLs and IADLs at baseline  -MoCA 21/30 (6/30/21) suggestive of some degree of underlying cognitive impairment at baseline, consider repeat testing as outpatient following recovery from acute illness to establish new baseline  -CTH obtained on admission imaging personally viewed, reveals at least moderate diffuse chronic microangiopathic changes most notable bilateral temporal areas  -TSH WNL 0.828, B12   -At risk age and cardiovascular progression of underlying cognitive impairment, continue secondary risk factor modifications  -Encourage patient remain physically, socially, cognitively active and engaged to maintain cognitive acuity  -Encourage use of sensory assistive device such as corrective lenses at all appropriate times to reduce risk of uncorrected sensory impairment from contributing to isolation,  confusion, encephalopathy and more precipitous cognitive decline     Major depressive disorder  -Symptoms reportedly well-controlled with home Zoloft regimen, continue home dosing  -Consider outpatient referral to counseling/support group as part of comprehensive multimodal treatment approach  -Reports good psychosocial support system in the home  -Continue close outpatient follow-up with PCP for ongoing monitoring and management     Impaired Vision  -recommend use of corrective lenses at all appropriate times  -encourage adequate lighting and encourage use of assistance with ambulation  -keep personal belongings close to person to avoid reaching  -encourage appropriate footwear at all times  -Consider large font for printed materials provided to patient     Frailty syndrome in geriatric patient   -Clinical frailty scale stage VI, moderately frail, progressive   -Multifactorial including age, brittle type I diabetic, CHF with preserved ejection fraction, ambulatory function with recurrent falls, adrenal insufficiency and multitude of additional chronic medical comorbidities now with recurrent orthostatic hypotension and syncope/collapse  -Continue optimization chronic additions and address acute metabolic derangements as arise  -continue to encourage well-balanced nutritional intake  -Ensure underlying anxiety/mood/depression symptoms are well-controlled as may impact patient response to therapies as well as overall sense of wellbeing and quality of life  -Continue to ensure the treatment interventions align the patient's wishes and goals of care  -Continue psychosocial supports of patient and caregivers    High risk developing delirium   -Patient is high risk of delirium due to age, syncope, hospitalization   -continue delirium precautions  -maintain normal sleep/wake cycle  -minimize overnight interruptions, group overnight vitals/labs/nursing checks as possible  -dim lights, close blinds and turn off tv to  minimize stimulation and encourage sleep environment in evenings  -monitor for fecal and urinary retention which may precipitate delirium  -encourage early mobilization and ambulation with assist as cleared to safely do so  -provide frequent reorientation and redirection as indicated and appropriate   -encourage family and friends at the bedside to help help calm patient if anxious  -redirect unwanted behaviors as first line    Care coordination: rounded with Candice (RN)    Subjective:     Karson is seen and examined at bedside where he is sitting resting, he notes that he slept well last night, denies pain or acute complaints and offers no acute complaints. Nursing reports no acute events overnight.    Review of Systems   Constitutional: Negative.  Negative for appetite change, chills and fever.   HENT: Negative.     Eyes: Negative.    Respiratory: Negative.  Negative for shortness of breath.    Cardiovascular: Negative.  Negative for chest pain and palpitations.   Gastrointestinal: Negative.  Negative for abdominal pain.   Genitourinary: Negative.  Negative for difficulty urinating.   Musculoskeletal:  Positive for gait problem.   Skin: Negative.    Neurological:  Negative for dizziness and light-headedness.   Hematological: Negative.    Psychiatric/Behavioral: Negative.  Negative for sleep disturbance.    All other systems reviewed and are negative.    Objective:     Vitals: Blood pressure 131/59, pulse 65, temperature 97.6 °F (36.4 °C), resp. rate 18, weight 87.1 kg (192 lb), SpO2 97%.,There is no height or weight on file to calculate BMI.      Intake/Output Summary (Last 24 hours) at 5/23/2025 1304  Last data filed at 5/22/2025 2300  Gross per 24 hour   Intake 1878.83 ml   Output 1210 ml   Net 668.83 ml     Current Medications: Reviewed    Physical Exam:   Physical Exam  Vitals and nursing note reviewed.   Constitutional:       General: He is not in acute distress.     Appearance: Normal appearance. He is not  toxic-appearing.   HENT:      Head: Normocephalic.      Nose: Nose normal.      Mouth/Throat:      Mouth: Mucous membranes are dry.     Eyes:      General: No scleral icterus.        Right eye: No discharge.         Left eye: No discharge.      Conjunctiva/sclera: Conjunctivae normal.     Neck:      Comments: Phonation norm   Cardiovascular:      Rate and Rhythm: Normal rate and regular rhythm.   Pulmonary:      Effort: Pulmonary effort is normal. No respiratory distress.      Breath sounds: No wheezing.   Abdominal:      General: Bowel sounds are normal. There is no distension.      Palpations: Abdomen is soft.      Tenderness: There is no abdominal tenderness.     Musculoskeletal:      Cervical back: Neck supple.      Comments: Reduced overall muscle mass      Skin:     General: Skin is warm and dry.     Neurological:      Mental Status: He is alert. Mental status is at baseline.      Comments: Awake and alert    Psychiatric:      Comments: Polite and cooperative         Invasive Devices       Peripheral Intravenous Line  Duration             Peripheral IV 05/21/25 Left Antecubital 2 days                  Lab Results:     I have personally reviewed pertinent lab results including the following:    Results from last 7 days   Lab Units 05/23/25  0549 05/22/25  0537 05/21/25  1521   WBC Thousand/uL 9.36 10.32* 13.92*   HEMOGLOBIN g/dL 9.5* 10.5* 9.3*   HEMATOCRIT % 31.3* 33.5* 29.6*   PLATELETS Thousands/uL 166 172 186   SEGS PCT % 74 74 75   MONO PCT % 11 11 14*   EOS PCT % 1 2 1     Results from last 7 days   Lab Units 05/23/25  0549 05/22/25  0537 05/21/25  1521 05/21/25  1511   POTASSIUM mmol/L 2.5* 2.2* 2.8*  --    CHLORIDE mmol/L 102 105 101  --    CO2 mmol/L 27 28 28  --    CO2, I-STAT mmol/L  --   --   --  33*   BUN mg/dL 14 10 11  --    CREATININE mg/dL 0.95 0.87 1.10  --    CALCIUM mg/dL 7.7* 7.7* 7.0*  --    ALK PHOS U/L  --   --  136*  --    ALT U/L  --   --  13  --    AST U/L  --   --  34  --    GLUCOSE,  ISTAT mg/dl  --   --   --  50*     I have personally reviewed the following imaging study reports in PACS:    No new imaging overnight

## 2025-05-23 NOTE — ASSESSMENT & PLAN NOTE
Monitor hemoglobin    Recent Labs     05/21/25  1521 05/22/25  0537 05/23/25  0549   HGB 9.3* 10.5* 9.5*

## 2025-05-23 NOTE — ASSESSMENT & PLAN NOTE
Patient presents syncope and collapse noted to have hypoglycemia with glucose of 40  Likely multifactorial  Patient with history of internal insufficiency and orthostatic hypotension  2D echo EF 64% grade 2 diastolic dysfunction, aortic valve sclerosis reported  Symptomatically improving  Received IV fluids  Monitor orthostatics

## 2025-05-23 NOTE — PLAN OF CARE
Problem: PAIN - ADULT  Goal: Verbalizes/displays adequate comfort level or baseline comfort level  Description: Interventions:  - Encourage patient to monitor pain and request assistance  - Assess pain using appropriate pain scale  - Administer analgesics as ordered based on type and severity of pain and evaluate response  - Implement non-pharmacological measures as appropriate and evaluate response  - Consider cultural and social influences on pain and pain management  - Notify physician/advanced practitioner if interventions unsuccessful or patient reports new pain  - Educate patient/family on pain management process including their role and importance of  reporting pain   - Provide non-pharmacologic/complimentary pain relief interventions  Outcome: Progressing     Problem: INFECTION - ADULT  Goal: Absence or prevention of progression during hospitalization  Description: INTERVENTIONS:  - Assess and monitor for signs and symptoms of infection  - Monitor lab/diagnostic results  - Monitor all insertion sites, i.e. indwelling lines, tubes, and drains  - Monitor endotracheal if appropriate and nasal secretions for changes in amount and color  - [place] appropriate cooling/warming therapies per order  - Administer medications as ordered  - Instruct and encourage patient and family to use good hand hygiene technique  - Identify and instruct in appropriate isolation precautions for identified infection/condition  Outcome: Progressing     Problem: SAFETY ADULT  Goal: Patient will remain free of falls  Description: INTERVENTIONS:  - Educate patient/family on patient safety including physical limitations  - Instruct patient to call for assistance with activity   - Consider consulting OT/PT to assist with strengthening/mobility based on AM PAC & JH-HLM score  - Consult OT/PT to assist with strengthening/mobility   - Keep Call bell within reach  - Keep bed low and locked with side rails adjusted as appropriate  - Keep  care items and personal belongings within reach  - Initiate and maintain comfort rounds  - Make Fall Risk Sign visible to staff  - Offer Toileting every  Hours, in advance of need  - Initiate/Maintain alarm  - Obtain necessary fall risk management equipment:   - Apply yellow socks and bracelet for high fall risk patients  - Consider moving patient to room near nurses station  Outcome: Progressing  Goal: Maintain or return to baseline ADL function  Description: INTERVENTIONS:  -  Assess patient's ability to carry out ADLs; assess patient's baseline for ADL function and identify physical deficits which impact ability to perform ADLs (bathing, care of mouth/teeth, toileting, grooming, dressing, etc.)  - Assess/evaluate cause of self-care deficits   - Assess range of motion  - Assess patient's mobility; develop plan if impaired  - Assess patient's need for assistive devices and provide as appropriate  - Encourage maximum independence but intervene and supervise when necessary  - Involve family in performance of ADLs  - Assess for home care needs following discharge   - Consider OT consult to assist with ADL evaluation and planning for discharge  - Provide patient education as appropriate  - Monitor functional capacity and physical performance, use of AM PAC & JH-HLM   - Monitor gait, balance and fatigue with ambulation    Outcome: Progressing  Goal: Maintains/Returns to pre admission functional level  Description: INTERVENTIONS:  - Perform AM-PAC 6 Click Basic Mobility/ Daily Activity assessment daily.  - Set and communicate daily mobility goal to care team and patient/family/caregiver.   - Collaborate with rehabilitation services on mobility goals if consulted  - Perform Range of Motion  times a day.  - Reposition patient every  hours.  - Dangle patient  times a day  - Stand patient  times a day  - Ambulate patient  times a day  - Out of bed to chair  times a day   - Out of bed for meals  times a day  - Out of bed for  toileting  - Record patient progress and toleration of activity level   Outcome: Progressing     Problem: DISCHARGE PLANNING  Goal: Discharge to home or other facility with appropriate resources  Description: INTERVENTIONS:  - Identify barriers to discharge w/patient and caregiver  - Arrange for needed discharge resources and transportation as appropriate  - Identify discharge learning needs (meds, wound care, etc.)  - Arrange for interpretive services to assist at discharge as needed  - Refer to Case Management Department for coordinating discharge planning if the patient needs post-hospital services based on physician/advanced practitioner order or complex needs related to functional status, cognitive ability, or social support system  Outcome: Progressing     Problem: Knowledge Deficit  Goal: Patient/family/caregiver demonstrates understanding of disease process, treatment plan, medications, and discharge instructions  Description: Complete learning assessment and assess knowledge base.  Interventions:  - Provide teaching at level of understanding  - Provide teaching via preferred learning methods  Outcome: Progressing

## 2025-05-23 NOTE — ASSESSMENT & PLAN NOTE
At home takes hydrocortisone 15 mg in the morning, 10 mg in the afternoon  Continue sick day dosing-hydrocortisone 30 mg in the morning, 20 mg in the afternoon  Wean hydrocortisone as tolerated back to home dose

## 2025-05-23 NOTE — ASSESSMENT & PLAN NOTE
Lab Results   Component Value Date    EGFR 81 05/23/2025    EGFR 88 05/22/2025    EGFR 68 05/21/2025    CREATININE 0.95 05/23/2025    CREATININE 0.87 05/22/2025    CREATININE 1.10 05/21/2025   Baseline creatinine 0.9-1.1  Monitor kidney function  Avoid nephrotoxins

## 2025-05-23 NOTE — PLAN OF CARE
Problem: OCCUPATIONAL THERAPY ADULT  Goal: Performs self-care activities at highest level of function for planned discharge setting.  See evaluation for individualized goals.  Description: Treatment Interventions: ADL retraining, Functional transfer training, Endurance training, Cognitive reorientation, Patient/family training, Energy conservation, Activityengagement          See flowsheet documentation for full assessment, interventions and recommendations.   Note: Limitation: Decreased ADL status, Decreased cognition, Decreased endurance, Decreased high-level ADLs  Prognosis: Good  Assessment: 70 year old pt seen today for an OT evaluation following admission to [facility] due to fall from orthostatic hypotension w/ possible hs and +LOS. Pt with present symptoms significant for pain, fatigue, weakness, decreased ADL status, decreased functional mobility, and impaired cognition. Pt  has a past medical history of Stroke (HCC). Pt lives with s/o and sister in a 3SH with 5 RYAN; bed/bath on 1st floor; walk-in shower w/ a shower chair and grab bars; standard toilet; RW for FM; -. PTA, pt receives assistance w/ ADLs/IADLs from s/o and sister PRN and typically performs all ADLs/IADLs on 1st floor. Pt reported feeling some pain in LLE. Pt very pleasant and cooperative t/o session. Pt completed functional bed mobility with Mod Ax1. Min Ax1 for functional STS txfs with RW. Min Ax1 for functional mobility with RW. Pt was Min A for UB ADLs and Mod A for LB ADLs. The patient's raw score on the AM-PAC Daily Activity Inpatient Short Form is 16. A raw score of less than 19 suggests the patient may benefit from discharge to post-acute rehabilitation services. Please refer to the recommendation of the Occupational Therapist for safe discharge planning. Pt is functioning below baseline level of function and will continue to benefit from skilled acute OT to promote increased independence and return to PLOF.  At this time, current OT recommendation is Level II Resources pending level of support and progress.     Rehab Resource Intensity Level, OT: II (Moderate Resource Intensity) (pending level of support and progress)

## 2025-05-23 NOTE — PLAN OF CARE
Problem: PHYSICAL THERAPY ADULT  Goal: Performs mobility at highest level of function for planned discharge setting.  See evaluation for individualized goals.  Description: Treatment/Interventions: Functional transfer training, Therapeutic exercise, Bed mobility, Gait training, Spoke to nursing, Spoke to case management, OT          See flowsheet documentation for full assessment, interventions and recommendations.  Note: Prognosis: Fair  Problem List: Decreased endurance, Decreased strength, Impaired balance, Decreased mobility, Decreased cognition, Pain  Assessment: Pt is a 70 y.o. male seen for PT evaluation s/p admit to [facility] on 5/21/2025. Pt was admitted with a primary dx of: Syncope and collapse, SIRS, DM, Hypomagnesia, Orthostatic hypotension, CKD, CHF, DISH, Hypokalemia.Pt has a past medical history of Stroke (HCC).   PT now consulted for assessment of mobility and d/c needs. Pt with Activity as tolerated orders.Pts current clinical presentation is Unstable/ Unpredictable (high complexity) due to Ongoing medical management for primary dx, Decreased activity tolerance compared to baseline, Fall risk, Increased assistance needed from caregiver at current time, Cog status, Trending lab values. Prior to admission, pt was requiring assist for mobility and ADLs from family. CM will clarify level fo assist provided. Upon evaluation, pt currently is requiring mod assist X 1 for bed mobility, then transferring and ambulkating with min assist X 1 and RW from bed to chair. Pt presents at PT eval functioning below baseline and currently w/ overall mobility deficits 2* to: BLE weakness, impaired balance, decreased endurance, gait deviations, pain, decreased activity tolerance compared to baseline, decreased functional mobility tolerance compared to baseline, decreased safety awareness, fall risk. Pt currently at a fall risk 2* to impairments listed above.  Pt will continue to benefit from skilled acute  PT interventions to address stated impairments; to maximize functional mobility; for ongoing pt/ family training; and DME needs. At conclusion of PT session chair alarm engaged, all needs in reach, RN notified of session findings/recommendations, and pt returned back in recliner chair with phone and call bell within reach. Pt denies any further questions at this time. The patient's AM-PAC Basic Mobility Inpatient Short Form Raw Score is 15. A Raw score of less than or equal to 16 suggests the patient may benefit from discharge to post-acute rehabilitation services. Please also refer to the recommendation of the Physical Therapist for safe discharge planning. Post dc recommendation pending further progress with mobility/stair trial + level of assistance available at home.        Rehab Resource Intensity Level, PT:  (pending further progress with mobility/stair assessment and level of assist at home.)    See flowsheet documentation for full assessment.

## 2025-05-23 NOTE — PROGRESS NOTES
Patient:    MRN:  [id number]    Tadeo Request ID:  [id number]    Level of care reserved:  Home Health Agency    Partner Reserved:  [place] [address] [phone]    Clinical needs requested:    Geography searched:  [phone]    Start of Service:    Request sent:  11:15am EDT on 5/23/2025 by Jennifer Newman    Partner reserved:  1:11pm EDT on 5/23/2025 by Jennifer Newman    Choice list shared:  1:11pm EDT on 5/23/2025 by Jennifer Newman

## 2025-05-23 NOTE — UTILIZATION REVIEW
Continued Stay Review    SEE INITIAL REVIEW AT BOTTOM     Current Patient Class: Inpatient  Current Level of Care: med surg telemetry    HPI:70 y.o. male initially admitted on 5/22/25   Current Diagnosis: Syncope and collapse     Date: 5/23/25  Day 3: Has surpassed a 2nd midnight with active treatments and services. Reports feeling slightly better today. Lungs diminished breath sounds. Encourage incentive spirometry. Monitor orthostatics, continue midodrine. Continue hydrocortisone per Endocrinology. Monitor VS including temp and labs including renal function.  Accuchecks. Low Na diet.         Medications:   Scheduled Medications:  apixaban, 5 mg, Oral, BID  aspirin, 81 mg, Oral, Daily  atorvastatin, 10 mg, Oral, Daily With Dinner  cholecalciferol, 1,000 Units, Oral, Daily  fludrocortisone, 0.2 mg, Oral, Daily  hydrocortisone, 40 mg, Oral, QAM   And  hydrocortisone, 30 mg, Oral, Daily With Lunch  insulin glargine, 5 Units, Subcutaneous, HS  insulin lispro, 1-5 Units, Subcutaneous, HS  insulin lispro, 1-6 Units, Subcutaneous, TID AC  midodrine, 5 mg, Oral, TID AC  potassium chloride, 20 mEq, Intravenous, Q2H  senna, 1 tablet, Oral, Daily  sertraline, 100 mg, Oral, Daily      Continuous IV Infusions: none     PRN Meds:  acetaminophen, 650 mg, Oral, Q6H PRN  aluminum-magnesium hydroxide-simethicone, 30 mL, Oral, Q6H PRN  ondansetron, 4 mg, Intravenous, Q6H PRN      Discharge Plan: tbd    Vital Signs (last 3 days)       Date/Time Temp Pulse Resp BP MAP (mmHg) SpO2 O2 Device Patient Position - Orthostatic VS Tiny Coma Scale Score Pain    05/23/25 10:30:36 -- 65 -- 131/59 83 97 % -- -- -- --    05/23/25 10:28:30 -- 64 -- 134/60 85 94 % -- -- -- --    05/23/25 10:27:04 -- 65 -- 135/57 83 97 % -- -- -- --    05/23/25 0950 -- -- -- -- -- -- -- -- 14 No Pain    05/23/25 08:57:03 -- 73 -- 160/72 101 95 % -- -- -- --    05/23/25 08:53:40 -- 68 -- 169/76 107 96 % -- -- -- --    05/23/25 08:49:21 -- 68 -- 170/90 117 98 % -- --  -- --    05/23/25 07:37:37 97.6 °F (36.4 °C) 67 18 170/90 117 99 % -- -- -- --    05/23/25 0300 98.2 °F (36.8 °C) -- -- -- -- -- -- -- -- --    05/23/25 02:58:55 97.3 °F (36.3 °C) 69 16 146/76 99 97 % -- -- -- --    05/22/25 19:21:10 98.1 °F (36.7 °C) 68 12 123/61 82 95 % -- -- -- --    05/22/25 1901 -- -- -- -- -- -- None (Room air) -- 14 No Pain    05/22/25 16:04:11 99.7 °F (37.6 °C) 78 -- -- -- 95 % None (Room air) -- -- --    05/22/25 14:55:20 100.1 °F (37.8 °C) 99 -- -- -- 95 % None (Room air) -- -- --    05/22/25 14:25:43 101.3 °F (38.5 °C) 77 16 160/62 95 87 % -- -- -- --    05/22/25 11:13:58 -- 87 18 127/78 94 95 % -- -- -- --    05/22/25 0900 -- -- -- -- -- 98 % None (Room air) -- 14 No Pain    05/22/25 07:21:18 98 °F (36.7 °C) 79 16 179/95 123 97 % -- -- -- --    05/22/25 0443 -- -- -- -- -- -- None (Room air) -- 14 No Pain    05/22/25 04:40:29 98.1 °F (36.7 °C) 82 20 180/93 122 98 % -- -- -- --    05/22/25 0415 -- 78 16 172/75 108 96 % None (Room air) Lying -- 8    05/22/25 0215 -- 79 18 143/89 110 96 % -- -- -- --    05/21/25 2315 -- 64 16 156/72 103 -- -- -- -- --    05/21/25 1915 -- 65 18 147/65 94 97 % None (Room air) Lying -- --    05/21/25 1800 -- 66 23 152/66 95 97 % None (Room air) Lying -- --    05/21/25 1700 -- 65 19 131/62 -- 92 % None (Room air) Lying 15 --    05/21/25 1630 -- 65 20 128/62 -- 95 % -- -- 15 --    05/21/25 1600 -- 69 20 117/56 -- 94 % -- -- 15 --    05/21/25 1530 -- 72 18 119/56 -- 96 % -- -- 15 --    05/21/25 1520 -- 68 19 -- -- 96 % -- -- 15 --    05/21/25 1510 -- 80 18 124/58 -- 95 % -- -- 15 --    05/21/25 1505 -- 78 17 120/60 -- 92 % -- -- 15 --    05/21/25 15:00:38 -- 72 18 101/53 -- 96 % None (Room air) -- 15 --    05/21/25 14:56:20 99.2 °F (37.3 °C) 73 18 112/54 -- 94 % None (Room air) -- 15 --          Weight (last 2 days)       Date/Time Weight    05/22/25 14:25:43 87.1 (192)    05/22/25 0443 87.4 (192.68)    05/21/25 14:56:20 86.1 (189.82)            Pertinent  Labs/Diagnostic Results:   Radiology:  TRAUMA - CT head wo contrast   Final Interpretation by E. Alec Schoenberger, MD (05/21 1622)      No acute intracranial abnormality.         I personally discussed this study with ELOISA RAMIREZ on 5/21/2025 4:16 PM.         Workstation performed: [id number]         TRAUMA - CT spine cervical wo contrast   Final Interpretation by E. Alec Schoenberger, MD (05/21 1622)      No cervical spine fracture or traumatic malalignment.         I personally discussed this study with ELOISA MENDEZALLO on 5/21/2025 4:16 PM.         Workstation performed: [id number]         CT chest abdomen pelvis wo contrast   Final Interpretation by E. Alec Schoenberger, MD (05/21 1622)      Bilateral pleural effusions   No acute intrathoracic or intra-abdominal injury.         Computerized Assisted Algorithm (CAA) may have aided analysis of applicable images.      I personally discussed this study with ELOISA ASHLEY on 5/21/2025 4:16 PM.            Workstation performed: [id number]         XR Trauma multiple (SLB/SLRA trauma bay ONLY)   Preliminary Result by Alina Griffiths (05/22 1400)      No acute cardiopulmonary disease within limitations of supine imaging.               Computerized Assisted Algorithm (CAA) may have been used to analyze all applicable images.            Workstation performed: [id number]         XR chest 1 view   Final Interpretation by Alina Griffiths (05/22 1400)      No acute cardiopulmonary disease within limitations of supine imaging.               Computerized Assisted Algorithm (CAA) may have been used to analyze all applicable images.            Workstation performed: [id number]           Cardiology:  Echo complete w/ contrast if indicated   Final Result by Johnie Sanchez MD (05/22 4635)        Left Ventricle: Left ventricular cavity size is normal. Wall thickness    is mildly increased. There is mild concentric hypertrophy. The left    ventricular ejection fraction is  64%. Systolic function is normal. Wall    motion is normal. Diastolic function is moderately abnormal, consistent    with grade II (pseudonormal) relaxation.     Left Atrium: The atrium is mildly dilated.     Aortic Valve: There is aortic valve sclerosis.     Mitral Valve: There is moderate annular calcification.           GI:  No orders to display       Results from last 7 days   Lab Units 05/21/25 1938   SARS-COV-2  Negative     Results from last 7 days   Lab Units 05/23/25  0549 05/22/25  0537 05/21/25  1521 05/21/25  1511   WBC Thousand/uL 9.36 10.32* 13.92*  --    HEMOGLOBIN g/dL 9.5* 10.5* 9.3*  --    I STAT HEMOGLOBIN g/dl  --   --   --  10.2*   HEMATOCRIT % 31.3* 33.5* 29.6*  --    HEMATOCRIT, ISTAT %  --   --   --  30*   PLATELETS Thousands/uL 166 172 186  --    TOTAL NEUT ABS Thousands/µL 6.87 7.72* 10.44*  --          Results from last 7 days   Lab Units 05/23/25  0549 05/22/25  0537 05/21/25 1938 05/21/25  1521 05/21/25  1511   SODIUM mmol/L 138 143  --  138  --    POTASSIUM mmol/L 2.5* 2.2*  --  2.8*  --    CHLORIDE mmol/L 102 105  --  101  --    CO2 mmol/L 27 28  --  28  --    CO2, I-STAT mmol/L  --   --   --   --  33*   ANION GAP mmol/L 9 10  --  9  --    BUN mg/dL 14 10  --  11  --    CREATININE mg/dL 0.95 0.87  --  1.10  --    EGFR ml/min/1.73sq m 81 88  --  68  --    CALCIUM mg/dL 7.7* 7.7*  --  7.0*  --    CALCIUM, IONIZED, ISTAT mmol/L  --   --   --   --  0.85*   MAGNESIUM mg/dL 1.9 1.7*  --   --   --    PHOSPHORUS mg/dL  --   --  2.7  --   --      Results from last 7 days   Lab Units 05/21/25  1521   AST U/L 34   ALT U/L 13   ALK PHOS U/L 136*   TOTAL PROTEIN g/dL 6.0*   ALBUMIN g/dL 3.4*   TOTAL BILIRUBIN mg/dL 0.91     Results from last 7 days   Lab Units 05/23/25  1130 05/23/25  0737 05/22/25  2159 05/22/25  1650 05/22/25  1112 05/22/25  0839 05/22/25  0209 05/22/25  0000 05/21/25  2228 05/21/25  2120 05/21/25  2003 05/21/25  1756   POC GLUCOSE mg/dl 341* 226* 281* 201* 167* 111 78 68 72 70  68 68     Results from last 7 days   Lab Units 05/23/25  0549 05/22/25  0537 05/21/25  1521   GLUCOSE RANDOM mg/dL 243* 153* 40*         Results from last 7 days   Lab Units 05/21/25  1521   HEMOGLOBIN A1C % 6.9*   EAG mg/dl 151     Results from last 7 days   Lab Units 05/21/25  1511   PH, SILVIO I-STAT  7.518*   PCO2, SILVIO ISTAT mm HG 39.1*   PO2, SILVIO ISTAT mm HG 29.0*   HCO3, SILVIO ISTAT mmol/L 31.7*   I STAT BASE EXC mmol/L 8*   I STAT O2 SAT % 62     Results from last 7 days   Lab Units 05/21/25  1521   CK TOTAL U/L 133     Results from last 7 days   Lab Units 05/21/25  1938 05/21/25  1800 05/21/25  1548   HS TNI 0HR ng/L  --   --  93*   HS TNI 2HR ng/L  --  96*  --    HSTNI D2 ng/L  --  3  --    HS TNI 4HR ng/L 99*  --   --    HSTNI D4 ng/L 6  --   --          Results from last 7 days   Lab Units 05/21/25  1521   PROTIME seconds 19.5*   INR  1.63*   PTT seconds 37*         Results from last 7 days   Lab Units 05/21/25  1938   PROCALCITONIN ng/ml 0.43*     Results from last 7 days   Lab Units 05/21/25  1938   LACTIC ACID mmol/L 0.8             Results from last 7 days   Lab Units 05/22/25  0537   BNP pg/mL 391*     Results from last 7 days   Lab Units 05/21/25  1938   INFLUENZA A PCR  Negative   INFLUENZA B PCR  Negative   RSV PCR  Negative       Results from last 7 days   Lab Units 05/22/25  1722   BLOOD CULTURE  Received in Microbiology Lab. Culture in Progress.  Received in Microbiology Lab. Culture in Progress.                   Network Utilization Review Department  ATTENTION: Please call with any questions or concerns to [phone] and carefully listen to the prompts so that you are directed to the right person. All voicemails are confidential.   For Discharge needs, contact Care Management DC Support Team at [phone] opt. 2  Send all requests for admission clinical reviews, approved or denied determinations and any other requests to dedicated fax number below belonging to the campus where the patient is  receiving treatment. List of dedicated fax numbers for the Facilities:  FACILITY NAME UR FAX NUMBER   ADMISSION DENIALS (Administrative/Medical Necessity) [phone]   DISCHARGE SUPPORT TEAM (NETWORK) [phone]   PARENT CHILD HEALTH (Maternity/NICU/Pediatrics) [phone]   [facility] [phone]   [facility] [phone]   [facility] [phone]   [facility] [phone]   [facility] [phone]   [facility] [phone]   [facility] [phone]   [facility] [phone]   [facility] [phone]   [facility] [phone]   [facility] [phone]   [facility] [phone]

## 2025-05-23 NOTE — ASSESSMENT & PLAN NOTE
SIRS present on admission  RSV/influenza/COVID-negative  Follow-up on blood cultures  CT chest abdomen pelvis no acute intrathoracic intra-abdominal findings  Monitor counts, temperatures

## 2025-05-23 NOTE — ASSESSMENT & PLAN NOTE
Lab Results   Component Value Date    HGBA1C 6.9 (H) 05/21/2025       Recent Labs     05/22/25  1650 05/22/25  2159 05/23/25  0737 05/23/25  1130   POCGLU 201* 281* 226* 341*     Blood Sugar Average: Last 72 hrs:  (P) [id number]  Type 1 diabetes mellitus with hyper and hypoglycemia  To have brittle blood sugars, previously admitted with hypoglycemia and known to endocrinology service  At home he takes Toujeo 12 units nightly, Humalog 4 units 3 times daily before meal, correctional scale insulin 1 unit for every 50 mg over 150  Started on Lantus 5 units nightly and correctional scale insulin algorithm 3 before meals and at bedtime  Blood sugars reviewed-now with fasting and postprandial hyperglycemia    Plan:  Recommend increasing Lantus to 10 units nightly  Start Humalog 3 units before meals  Continue sliding scale insulin algorithm 3 before meals and 2 at bedtime  Continue Accu-Cheks before meals and at bedtime, as well as 2 AM  Encourage p.o. intake.  Wean D5LR as tolerated  Goal blood sugar while in the [id number] mg/dL  Endocrinology will continue following

## 2025-05-23 NOTE — ASSESSMENT & PLAN NOTE
In the setting of hypoglycemia as evidenced by blood sugar of 40 mg/dL on BMP on admission.   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  Now weaned off of IV dextrose with significant improvement in blood sugar and noted to have some hyperglycemia.  Continue monitoring vital signs and blood sugars  Correct electrolyte abnormalities  Rest of plan as below

## 2025-05-23 NOTE — PLAN OF CARE
Problem: PAIN - ADULT  Goal: Verbalizes/displays adequate comfort level or baseline comfort level  Description: Interventions:  - Encourage patient to monitor pain and request assistance  - Assess pain using appropriate pain scale  - Administer analgesics as ordered based on type and severity of pain and evaluate response  - Implement non-pharmacological measures as appropriate and evaluate response  - Consider cultural and social influences on pain and pain management  - Notify physician/advanced practitioner if interventions unsuccessful or patient reports new pain  - Educate patient/family on pain management process including their role and importance of  reporting pain   - Provide non-pharmacologic/complimentary pain relief interventions  Outcome: Progressing     Problem: INFECTION - ADULT  Goal: Absence or prevention of progression during hospitalization  Description: INTERVENTIONS:  - Assess and monitor for signs and symptoms of infection  - Monitor lab/diagnostic results  - Monitor all insertion sites, i.e. indwelling lines, tubes, and drains  - Monitor endotracheal if appropriate and nasal secretions for changes in amount and color  - [place] appropriate cooling/warming therapies per order  - Administer medications as ordered  - Instruct and encourage patient and family to use good hand hygiene technique  - Identify and instruct in appropriate isolation precautions for identified infection/condition  Outcome: Progressing     Problem: SAFETY ADULT  Goal: Patient will remain free of falls  Description: INTERVENTIONS:  - Educate patient/family on patient safety including physical limitations  - Instruct patient to call for assistance with activity   - Consider consulting OT/PT to assist with strengthening/mobility based on AM PAC & JH-HLM score  - Consult OT/PT to assist with strengthening/mobility   - Keep Call bell within reach  - Keep bed low and locked with side rails adjusted as appropriate  - Keep  care items and personal belongings within reach  - Initiate and maintain comfort rounds  - Apply yellow socks and bracelet for high fall risk patients  - Consider moving patient to room near nurses station  Outcome: Progressing  Goal: Maintain or return to baseline ADL function  Description: INTERVENTIONS:  -  Assess patient's ability to carry out ADLs; assess patient's baseline for ADL function and identify physical deficits which impact ability to perform ADLs (bathing, care of mouth/teeth, toileting, grooming, dressing, etc.)  - Assess/evaluate cause of self-care deficits   - Assess range of motion  - Assess patient's mobility; develop plan if impaired  - Assess patient's need for assistive devices and provide as appropriate  - Encourage maximum independence but intervene and supervise when necessary  - Involve family in performance of ADLs  - Assess for home care needs following discharge   - Consider OT consult to assist with ADL evaluation and planning for discharge  - Provide patient education as appropriate  - Monitor functional capacity and physical performance, use of AM PAC & JH-HLM   - Monitor gait, balance and fatigue with ambulation    Outcome: Progressing  Goal: Maintains/Returns to pre admission functional level  Description: INTERVENTIONS:  - Perform AM-PAC 6 Click Basic Mobility/ Daily Activity assessment daily.  - Set and communicate daily mobility goal to care team and patient/family/caregiver.   - Collaborate with rehabilitation services on mobility goals if consulted  - Out of bed for toileting  - Record patient progress and toleration of activity level   Outcome: Progressing     Problem: DISCHARGE PLANNING  Goal: Discharge to home or other facility with appropriate resources  Description: INTERVENTIONS:  - Identify barriers to discharge w/patient and caregiver  - Arrange for needed discharge resources and transportation as appropriate  - Identify discharge learning needs (meds, wound care,  etc.)  - Arrange for interpretive services to assist at discharge as needed  - Refer to Case Management Department for coordinating discharge planning if the patient needs post-hospital services based on physician/advanced practitioner order or complex needs related to functional status, cognitive ability, or social support system  Outcome: Progressing     Problem: Knowledge Deficit  Goal: Patient/family/caregiver demonstrates understanding of disease process, treatment plan, medications, and discharge instructions  Description: Complete learning assessment and assess knowledge base.  Interventions:  - Provide teaching at level of understanding  - Provide teaching via preferred learning methods  Outcome: Progressing     Problem: Prexisting or High Potential for Compromised Skin Integrity  Goal: Skin integrity is maintained or improved  Description: INTERVENTIONS:  - Identify patients at risk for skin breakdown  - Assess and monitor skin integrity including under and around medical devices   - Assess and monitor nutrition and hydration status  - Monitor labs  - Assess for incontinence   - Turn and reposition patient  - Assist with mobility/ambulation  - Relieve pressure over rea prominences   - Avoid friction and shearing  - Provide appropriate hygiene as needed including keeping skin clean and dry  - Evaluate need for skin moisturizer/barrier cream  - Collaborate with interdisciplinary team  - Patient/family teaching  - Consider wound care consult  degrees   - Toileting:  - Offer bedside commode    Outcome: Progressing

## 2025-05-23 NOTE — ASSESSMENT & PLAN NOTE
Present on admission glucose of 40  Hypoglycemia resolved  Endocrinology inputs noted  Hydrocortisone dose increased to 40 mg a.m., 30 mg p.m.  Avoid hypoglycemia, encourage p.o. intake  Monitor Accu-Cheks

## 2025-05-23 NOTE — PHYSICAL THERAPY NOTE
Physical Therapy Evaluation     Patient's Name: Karson You    Admitting Diagnosis  Hypocalcemia [E83.51]  Adrenal insufficiency ([facility]) [E27.40]  Syncope [R55]  Hypoglycemia [E16.2]  CKD (chronic kidney disease) stage 5, GFR less than 15 ml/min ([facility]) [N18.5]  Chronic heart failure with preserved ejection fraction ([facility]) [I50.32]    Problem List  Problem List[1]    Past Medical History  Past Medical History[2]    Past Surgical History  Past Surgical History[3]         05/23/25 0858   PT Last Visit   PT Visit Date 05/23/25   Note Type   Note type Evaluation   Pain Assessment   Pain Assessment Tool 0-10   Pain Score 6   Pain Location/Orientation Orientation: Left;Location: Leg   Pain Onset/Description Onset: Ongoing   Effect of Pain on Daily Activities Increased time for activity   Patient's Stated Pain Goal No pain   Hospital Pain Intervention(s) Repositioned;Ambulation/increased activity;Emotional support   Restrictions/Precautions   Weight Bearing Precautions Per Order Yes   LLE Weight Bearing Per Order WBAT- Lt IT fracture in recent past s/p IM nail  (from prior notes)   Other Precautions Cognitive;Chair Alarm;Bed Alarm;Multiple lines;Fall Risk;Pain   Home Living   Type of Home House   Home Layout Multi-level;Able to live on main level with bedroom/bathroom;Performs ADLs on one level;Stairs to enter with rails  (3STH with 5STE with FFSu)   Bathroom Shower/Tub Walk-in shower   Bathroom Toilet Standard   Bathroom Equipment Grab bars in shower;Shower chair   Home Equipment Walker   Additional Comments 3STH with FFSU   Prior Function   Level of [place] Needs assistance with ADLs;Needs assistance with functional mobility;Needs assistance with IADLS   Lives With Significant other;Other (Comment)  (Sister)   Receives Help From Family   IADLs Family/Friend/Other provides transportation;Family/Friend/Other provides meals;Family/Friend/Other provides medication management   Falls in the last 6 months 1 to 4  (2 falls)    Vocational Retired   Comments Per CM , pt was receiving assist from SO and sister for mobility and ADLs   General   Family/Caregiver Present No   Cognition   Overall Cognitive Status Impaired   Arousal/Participation Responsive   Attention Attends with cues to redirect   Orientation Level Oriented to person;Oriented to place;Disoriented to time;Disoriented to situation   Following Commands Follows one step commands with increased time or repetition   Comments Pt cooperative and pleasant during session   Subjective   Subjective Agreeable to mobilize   RLE Assessment   RLE Assessment   (grossly 3+/5)   LLE Assessment   LLE Assessment   (grossly 3/5 , limited 2* pain)   Vision-Basic Assessment   Current Vision Wears glasses all the time   Bed Mobility   Supine to Sit 3  Moderate assistance   Additional items Assist x 1;HOB elevated;Bedrails;Increased time required;Verbal cues;LE management   Sit to Supine Unable to assess   Additional Comments Pt in bed upon arrival.   Transfers   Sit to Stand 4  Minimal assistance   Additional items Assist x 1;Increased time required;Armrests;Verbal cues   Stand to Sit 4  Minimal assistance   Additional items Assist x 1;Armrests;Increased time required   Additional Comments w/RW- VCs for hand placement   Ambulation/Elevation   Gait pattern Improper Weight shift;Forward Flexion;Excessively slow;Step to   Gait Assistance 4  Minimal assist   Additional items Assist x 1;Verbal cues;Tactile cues   Assistive Device Rolling walker   Distance 4 ft   Stair Management Assistance Not tested   Ambulation/Elevation Additional Comments Limited ambulation 2* gen weakness and fatigue. Pt c/o mild lightheadedness upon initial activity, symtpoms resolve with rest. BP checked 160/59.     Balance   Static Sitting Fair +   Dynamic Sitting Fair   Static Standing Fair -   Dynamic Standing Poor +   Ambulatory Poor +   Endurance Deficit   Endurance Deficit Yes   Activity Tolerance   Activity Tolerance  Patient limited by fatigue;Patient limited by pain   Medical Staff Made Aware Co-eval with OT 2* medical complexity adn multiple co morbidities   Nurse Made Aware RN cleared   Assessment   Prognosis Fair   Problem List Decreased endurance;Decreased strength;Impaired balance;Decreased mobility;Decreased cognition;Pain   Assessment Pt is a 70 y.o. male seen for PT evaluation s/p admit to [facility] on 5/21/2025. Pt was admitted with a primary dx of: Syncope and collapse, SIRS, DM, Hypomagnesia, Orthostatic hypotension, CKD, CHF, DISH, Hypokalemia.Pt has a past medical history of Stroke (HCC).   PT now consulted for assessment of mobility and d/c needs. Pt with Activity as tolerated orders.Pts current clinical presentation is Unstable/ Unpredictable (high complexity) due to Ongoing medical management for primary dx, Decreased activity tolerance compared to baseline, Fall risk, Increased assistance needed from caregiver at current time, Cog status, Trending lab values. Prior to admission, pt was requiring assist for mobility and ADLs from family. CM will clarify level fo assist provided. Upon evaluation, pt currently is requiring mod assist X 1 for bed mobility, then transferring and ambulkating with min assist X 1 and RW from bed to chair. Pt presents at PT eval functioning below baseline and currently w/ overall mobility deficits 2* to: BLE weakness, impaired balance, decreased endurance, gait deviations, pain, decreased activity tolerance compared to baseline, decreased functional mobility tolerance compared to baseline, decreased safety awareness, fall risk. Pt currently at a fall risk 2* to impairments listed above.  Pt will continue to benefit from skilled acute PT interventions to address stated impairments; to maximize functional mobility; for ongoing pt/ family training; and DME needs. At conclusion of PT session chair alarm engaged, all needs in reach, RN notified of session findings/recommendations,  and pt returned back in recliner chair with phone and call bell within reach. Pt denies any further questions at this time. The patient's AM-PAC Basic Mobility Inpatient Short Form Raw Score is 15. A Raw score of less than or equal to 16 suggests the patient may benefit from discharge to post-acute rehabilitation services. Please also refer to the recommendation of the Physical Therapist for safe discharge planning. Post dc recommendation pending further progress with mobility/stair trial + level of assistance available at home.   Goals   Patient Goals to have less pain   STG Expiration Date 06/06/25   Short Term Goal #1 STG 1. Pt will be able to perform bed mobility tasks with Min in order to improve overall functional mobility and assist in safe d/c. STG 2. Pt with sit EOB for at least 25 minutes at sUP level in order to strengthen abdominal musculature and assist in future transfers/ ambulation. STG 3.  Pt will be able to ambulate at least 75 feet with least restrictive device with mIN A in order to improve overall functional mobility and assist in safe d/c. STG 5. Pt will improve sitting/standing static/dynamic balance 1/2 grade in order to improve functional mobility and assist in safe d/c. STG 6. Pt will improve LE strength by 1/2 grade in order to improve functional mobility and assist in safe d/c. STG 7. Pt will be able to negotiate at least 5 stairs with least restrictive device with mIN A in order to improve overall functional mobility and assist in safe d/c.   PT Treatment Day 0   Plan   Treatment/Interventions Functional transfer training;Therapeutic exercise;Bed mobility;Gait training;Spoke to nursing;Spoke to case management;OT   PT Frequency 3-5x/wk   Discharge Recommendation   Rehab Resource Intensity Level, PT   (pending further progress with mobility/stair assessment and level of assist at home.)   AM-PAC Basic Mobility Inpatient   Turning in Flat Bed Without Bedrails 3   Lying on Back to Sitting  on Edge of Flat Bed Without Bedrails 2   Moving Bed to Chair 3   Standing Up From Chair Using Arms 3   Walk in Room 2   Climb 3-5 Stairs With Railing 2   Basic Mobility Inpatient Raw Score 15   Basic Mobility Standardized Score 36.97   [facility] Highest Level Of Mobility   -HL Goal 4: Move to chair/commode   -HLM Achieved 5: Stand (1 or more minutes)   Modified Beggs Scale   Modified Beggs Scale 4   End of Consult   Patient Position at End of Consult All needs within reach;Bed/Chair alarm activated;Bedside chair       Julieth Garcia PT DPT         [1]   Patient Active Problem List  Diagnosis    Paroxysmal A-fib (HCC)    Adrenal insufficiency (HCC)    Hypoglycemia    Syncope and collapse    Orthostatic hypotension    History of cerebrovascular accident (CVA) in adulthood    Iron deficiency anemia    CKD (chronic kidney disease) stage 5, GFR less than 15 ml/min (HCC)    Chronic heart failure with preserved ejection fraction (HCC)    PAD (peripheral artery disease) (HCC)    DISH (diffuse idiopathic skeletal hyperostosis)    Hypocalcemia    Hypokalemia    SIRS (systemic inflammatory response syndrome) (HCC)    Type 1 diabetes mellitus (HCC)    Hypomagnesemia   [2]   Past Medical History:  Diagnosis Date    Stroke (HCC)    [3]   Past Surgical History:  Procedure Laterality Date    CARPAL TUNNEL RELEASE

## 2025-05-23 NOTE — OCCUPATIONAL THERAPY NOTE
Occupational Therapy Evaluation     Patient Name: Karson You  Today's Date: 5/23/2025  Problem List  Principal Problem:    Syncope and collapse  Active Problems:    Paroxysmal A-fib (HCC)    Adrenal insufficiency (HCC)    Hypoglycemia    Orthostatic hypotension    History of cerebrovascular accident (CVA) in adulthood    Iron deficiency anemia    CKD (chronic kidney disease) stage 5, GFR less than 15 ml/min (HCC)    Chronic heart failure with preserved ejection fraction (HCC)    PAD (peripheral artery disease) (HCC)    DISH (diffuse idiopathic skeletal hyperostosis)    Hypocalcemia    Hypokalemia    SIRS (systemic inflammatory response syndrome) (HCC)    Type 1 diabetes mellitus (HCC)    Hypomagnesemia    Past Medical History  Past Medical History[1]  Past Surgical History  Past Surgical History[2]        05/23/25 0859   OT Last Visit   OT Visit Date 05/23/25   Note Type   Note type Evaluation   Pain Assessment   Pain Assessment Tool 0-10   Pain Score 6   Pain Location/Orientation Orientation: Left;Location: Leg   Pain Onset/Description Onset: Ongoing   Effect of Pain on Daily Activities decreased tolerance to ADLs   Patient's Stated Pain Goal No pain   Hospital Pain Intervention(s) Repositioned;Ambulation/increased activity;Emotional support   Multiple Pain Sites No   Restrictions/Precautions   Weight Bearing Precautions Per Order Yes   LLE Weight Bearing Per Order WBAT   Other Precautions Cognitive;Chair Alarm;Bed Alarm;Multiple lines;Fall Risk;Pain;WBS   Home Living   Type of Home House   Home Layout Multi-level;Able to live on main level with bedroom/bathroom;Performs ADLs on one level;Stairs to enter with rails  (3 SH; 5 RYAN; pt reports bed/bath on 1st floor and typically stays on 1st floor for all ADLs/IADLs)   Bathroom Shower/Tub Walk-in shower   Bathroom Toilet Standard   Bathroom Equipment Grab bars in shower;Shower chair   Home Equipment Walker   Additional Comments 3SH; 5 RYAN; pt reports living on 1st  "w/ bath/bed; performs all ADLs/IADLs on 1st floor   Prior Function   Level of [place] Needs assistance with ADLs;Needs assistance with functional mobility;Needs assistance with IADLS   Lives With Spouse;Family  (lives w/ s/o and sister)   Receives Help From Family   IADLs Family/Friend/Other provides transportation;Family/Friend/Other provides meals;Family/Friend/Other provides medication management   Falls in the last 6 months 1 to 4  (2 falls in the past 6 months)   Vocational Retired   Comments pt receives assistance w/ ADLs/IADLs from s/o and sister   Lifestyle   Autonomy PTA, pt receives assistance w/ ADLs/IADLs; from s/o and sister -   Reciprocal Relationships lives w/ s/o and sister   Service to Others retired   Intrinsic Gratification enjoys cars/working on cars   General   Family/Caregiver Present No   Subjective   Subjective \"I used to be a \"   ADL   Where Assessed Edge of bed   Eating Assistance 5  Supervision/Setup   Grooming Assistance 4  Minimal Assistance   UB Bathing Assistance 4  Minimal Assistance   LB Bathing Assistance 3  Moderate Assistance   UB Dressing Assistance 4  Minimal Assistance   LB Dressing Assistance 3  Moderate Assistance   Toileting Assistance  3  Moderate Assistance   Functional Assistance 4  Minimal Assistance   Bed Mobility   Supine to Sit 3  Moderate assistance   Additional items Assist x 1;HOB elevated;Increased time required;Verbal cues   Sit to Supine Unable to assess   Additional Comments pt OOB in recliner post session   Transfers   Sit to Stand 4  Minimal assistance   Additional items Assist x 1;HOB elevated;Increased time required;Verbal cues   Stand to Sit 4  Minimal assistance   Additional items Assist x 1;Increased time required;Verbal cues   Additional Comments w/ RW in stance   Functional Mobility   Functional Mobility 4  Minimal assistance   Additional Comments household distances; min Ax1 w/ RW   Additional items Rolling walker   Balance   Static " Sitting Fair +   Dynamic Sitting Fair +   Static Standing Poor +   Dynamic Standing Poor +   Ambulatory Poor +   Activity Tolerance   Activity Tolerance Patient limited by fatigue;Patient limited by pain   Medical Staff Made Aware Co-eval w/ PT 2/2 pt complexity and comorbidities   Nurse Made Aware RN cleared   RUE Assessment   RUE Assessment WFL   LUE Assessment   LUE Assessment WFL   Hand Function   Gross Motor Coordination Functional   Fine Motor Coordination Functional   Vision-Basic Assessment   Current Vision Wears glasses all the time   Cognition   Overall Cognitive Status Impaired   Arousal/Participation Alert;Cooperative   Attention Attends with cues to redirect   Orientation Level Oriented to person;Oriented to place;Oriented to situation;Disoriented to time  (pt reported date was June 2005)   Memory Decreased recall of biographical information;Decreased short term memory   Following Commands Follows one step commands with increased time or repetition   Comments pt was pleasant and cooperative t/o session; baseline cognitive deficits; has supervision 24/7 at home   Assessment   Limitation Decreased ADL status;Decreased cognition;Decreased endurance;Decreased high-level ADLs   Prognosis Good   Assessment 70 year old pt seen today for an OT evaluation following admission to [facility] due to fall from orthostatic hypotension w/ possible hs and +LOS. Pt with present symptoms significant for pain, fatigue, weakness, decreased ADL status, decreased functional mobility, and impaired cognition. Pt  has a past medical history of Stroke (HCC). Pt lives with s/o and sister in a 3SH with 5 RYAN; bed/bath on 1st floor; walk-in shower w/ a shower chair and grab bars; standard toilet; RW for FM; -. PTA, pt receives assistance w/ ADLs/IADLs from s/o and sister PRN and typically performs all ADLs/IADLs on 1st floor. Pt reported feeling some pain in LLE. Pt very pleasant and cooperative t/o session.  Pt completed functional bed mobility with Mod Ax1. Min Ax1 for functional STS txfs with RW. Min Ax1 for functional mobility with RW. Pt was Min A for UB ADLs and Mod A for LB ADLs. The patient's raw score on the AM-PAC Daily Activity Inpatient Short Form is 16. A raw score of less than 19 suggests the patient may benefit from discharge to post-acute rehabilitation services. Please refer to the recommendation of the Occupational Therapist for safe discharge planning. Pt is functioning below baseline level of function and will continue to benefit from skilled acute OT to promote increased independence and return to PLOF. At this time, current OT recommendation is Level II Resources pending level of support and progress.   Goals   Patient Goals to decrease pain and return to PLOF   LTG Time Frame 10-14   Long Term Goal #1 see below for goals   Plan   Treatment Interventions ADL retraining;Functional transfer training;Endurance training;Cognitive reorientation;Patient/family training;Energy conservation;Activityengagement   Goal Expiration Date 06/06/25   OT Frequency 2-3x/wk   Discharge Recommendation   Rehab Resource Intensity Level, OT II (Moderate Resource Intensity)  (pending level of support and progress)   AM-PAC Daily Activity Inpatient   Lower Body Dressing 2   Bathing 2   Toileting 2   Upper Body Dressing 3   Grooming 3   Eating 4   Daily Activity Raw Score 16   Daily Activity Standardized Score (Calc for Raw Score >=11) 35.96   AM-PAC Applied Cognition Inpatient   Following a Speech/Presentation 3   Understanding Ordinary Conversation 3   Taking Medications 2   Remembering Where Things Are Placed or Put Away 2   Remembering List of 4-5 Errands 2   Taking Care of Complicated Tasks 2   Applied Cognition Raw Score 14   Applied Cognition Standardized Score 32.02   End of Consult   Education Provided Yes   Patient Position at End of Consult Bedside chair;Bed/Chair alarm activated;All needs within reach   Nurse  Communication Nurse aware of consult   End of Consult Comments pt ended session OOB in bedside chair; chair alarm activated; call bell within reach; all needs met       Pt will be SUP for UB dressing and bathing ADLs with use of appropriate AE PRN within this care plan.    Pt will be Min A for LB dressing and bathing ADLs with use of appropriate AE PRN within this care plan.    Pt will be SUP with bed mobility with good sitting balance/tolerance to engage in self care tasks within this care plan.    Pt will be SUP in completing functional transfers with use of appropriate AD within this care plan.    Pt will increase activity tolerance in ADLs to 30 minutes to improve occupational performance within this care plan.    Pt will improve standing tolerance to 2-3 minutes to improve occupational performance within this care plan.    Pt will improve performance in ADLs that require reaching outside RENÉ to PLOF/baseline to improve strength and endurance within this care plan.             [1]   Past Medical History:  Diagnosis Date    Stroke (HCC)    [2]   Past Surgical History:  Procedure Laterality Date    CARPAL TUNNEL RELEASE

## 2025-05-23 NOTE — CASE MANAGEMENT
Case Management Discharge Planning Note    Patient name Karson You  Location [facility] 815/[facility] 815-01 MRN [id number]  : 1955 Date 2025       Current Admission Date: 2025  Current Admission Diagnosis:Syncope and collapse   Patient Active Problem List    Diagnosis Date Noted    Type 1 diabetes mellitus ([facility]) 2025    Hypomagnesemia 2025    Paroxysmal A-fib ([facility]) 2025    Adrenal insufficiency ([facility]) 2025    Hypoglycemia 2025    Syncope and collapse 2025    Orthostatic hypotension 2025    History of cerebrovascular accident (CVA) in adulthood 2025    Iron deficiency anemia 2025    CKD (chronic kidney disease) stage 5, GFR less than 15 ml/min ([facility]) 2025    Chronic heart failure with preserved ejection fraction ([facility]) 2025    PAD (peripheral artery disease) ([facility]) 2025    DISH (diffuse idiopathic skeletal hyperostosis) 2025    Hypocalcemia 2025    Hypokalemia 2025    SIRS (systemic inflammatory response syndrome) ([facility]) 2025      LOS (days): 1  Geometric Mean LOS (GMLOS) (days): 3.4  Days to GMLOS:2.5     OBJECTIVE:  Risk of Unplanned Readmission Score: 16.71      Current admission status: Inpatient   Preferred Pharmacy:   PATIENT/FAMILY REPORTS NO PREFERRED PHARMACY  No address on file      Primary Care Provider: No primary care provider on file.    Primary Insurance: [facility]  Secondary Insurance:     DISCHARGE DETAILS:    Requested Home Health Care         Is the patient interested in HHC at discharge?: Yes  Home Health Discipline requested:: Occupational Therapy, Physical Therapy, Nursing  [facility] Reason (only applicable if external [facility] name selected): Patient has established relationship with provider  Home Health Follow-Up Provider:: PCP  Home Health Services Needed:: Evaluate Functional Status and Safety, Gait/ADL Training,  Strengthening/Theraputic Exercises to Improve Function  Homebound Criteria Met:: Uses an Assist Device (i.e. cane, walker, etc), Requires the Assistance of Another Person for Safe Ambulation or to Leave the Home  Supporting Clincal Findings:: Fatigues Easliy in Short Distances, Dyspnea with Exertion    Other Referral/Resources/Interventions Provided:  Interventions: [facility]    Would you like to participate in our [facility] service program?  : No - Declined    Treatment Team Recommendation: Home with Home Health Care  Discharge Destination Plan:: Home with Home Health Care    Additional Comments: CM arranged [facility] for [facility] PT/OT/SN.

## 2025-05-23 NOTE — PROGRESS NOTES
Progress Note - Endocrinology   Name: Karson You 70 y.o. male I MRN: [id number]  Unit/Bed#: [facility] 815-01 I Date of Admission: 5/21/2025   Date of Service: 5/23/2025 I Hospital Day: 1    Assessment & Plan  Syncope and collapse  In the setting of hypoglycemia as evidenced by blood sugar of 40 mg/dL on BMP on admission.   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  Now weaned off of IV dextrose with significant improvement in blood sugar and noted to have some hyperglycemia.  Continue monitoring vital signs and blood sugars  Correct electrolyte abnormalities  Rest of plan as below  Type 1 diabetes mellitus (HCC)  Lab Results   Component Value Date    HGBA1C 6.9 (H) 05/21/2025       Recent Labs     05/22/25  1650 05/22/25  2159 05/23/25  0737 05/23/25  1130   POCGLU 201* 281* 226* 341*     Blood Sugar Average: Last 72 hrs:  (P) 138.6264204550042388  Type 1 diabetes mellitus with hyper and hypoglycemia  To have brittle blood sugars, previously admitted with hypoglycemia and known to endocrinology service  At home he takes Toujeo 12 units nightly, Humalog 4 units 3 times daily before meal, correctional scale insulin 1 unit for every 50 mg over 150  Started on Lantus 5 units nightly and correctional scale insulin algorithm 3 before meals and at bedtime  Blood sugars reviewed-now with fasting and postprandial hyperglycemia    Plan:  Recommend increasing Lantus to 10 units nightly  Start Humalog 3 units before meals  Continue sliding scale insulin algorithm 3 before meals and 2 at bedtime  Continue Accu-Cheks before meals and at bedtime, as well as 2 AM  Encourage p.o. intake.  Wean D5LR as tolerated  Goal blood sugar while in the [id number] mg/dL  Endocrinology will continue following  Adrenal insufficiency (HCC)  At home takes hydrocortisone 15 mg in the morning, 10 mg in the afternoon  Continue sick day dosing-hydrocortisone 30 mg in the  morning, 20 mg in the afternoon  Wean hydrocortisone as tolerated back to home dose  Hypoglycemia  Plan as above  Now resolved  Orthostatic hypotension  Continue fludrocortisone 0.2 mg daily  Hypomagnesemia      24 Hour Events : No significant overnight events  Subjective : Patient seen and examined at the bedside, not in acute distress at the time of my evaluation.  Endorses good appetite, denies nausea or vomiting, signs or symptoms of hypoglycemia.  Additionally spoke with patient's daughter over the phone who reports patient home dose of hydrocortisone is 15 units in the morning and 10 units in the afternoon.    Objective :  Temp:  [97.3 °F (36.3 °C)-98.2 °F (36.8 °C)] 98 °F (36.7 °C)  HR:  [64-73] 67  BP: (123-170)/(57-90) 139/84  Resp:  [12-20] 20  SpO2:  [94 %-99 %] 98 %  O2 Device: None (Room air)    Physical Exam  Vitals and nursing note reviewed.   Constitutional:       General: He is not in acute distress.     Appearance: Normal appearance. He is not ill-appearing, toxic-appearing or diaphoretic.   HENT:      Head: Normocephalic and atraumatic.      Nose: Nose normal.      Mouth/Throat:      Pharynx: Oropharynx is clear.     Eyes:      General: No scleral icterus.        Right eye: No discharge.         Left eye: No discharge.      Extraocular Movements: Extraocular movements intact.      Conjunctiva/sclera: Conjunctivae normal.     Pulmonary:      Effort: Pulmonary effort is normal. No respiratory distress.   Abdominal:      General: There is no distension.     Musculoskeletal:         General: Normal range of motion.      Cervical back: Normal range of motion and neck supple.     Skin:     General: Skin is warm and dry.      Coloration: Skin is not jaundiced or pale.     Neurological:      Mental Status: He is alert and oriented to person, place, and time. Mental status is at baseline.     Psychiatric:         Mood and Affect: Mood normal.         Behavior: Behavior normal.         Thought Content:  Thought content normal.         Judgment: Judgment normal.         Lab Results: I have reviewed the following results:CBC/BMP:   .     05/23/25  0549   WBC 9.36   HGB 9.5*   HCT 31.3*      SODIUM 138   K 2.5*      CO2 27   BUN 14   CREATININE 0.95   GLUC 243*   MG 1.9    , Creatinine Clearance: CrCl cannot be calculated (Unknown ideal weight.)., LFTs: No new results in last 24 hours.     Imaging Results Review: No pertinent imaging studies reviewed.  Other Study Results Review: No additional pertinent studies reviewed.

## 2025-05-23 NOTE — ASSESSMENT & PLAN NOTE
History of renal insufficiency  Continue hydrocortisone 40 mg a.m., 30 mams p.m. per endocrinology  Endocrinology inputs noted

## 2025-05-23 NOTE — PROGRESS NOTES
Progress Note - Hospitalist   Name: Karson You 70 y.o. male I MRN: [id number]  Unit/Bed#: [facility] 815-01 I Date of Admission: 5/21/2025   Date of Service: 5/23/2025 I Hospital Day: 1     Assessment & Plan  Syncope and collapse  Patient presents syncope and collapse noted to have hypoglycemia with glucose of 40  Likely multifactorial  Patient with history of internal insufficiency and orthostatic hypotension  2D echo EF 64% grade 2 diastolic dysfunction, aortic valve sclerosis reported  Symptomatically improving  Received IV fluids  Monitor orthostatics    Paroxysmal A-fib ([facility])  Continue Eliquis for anticoagulation  Adrenal insufficiency ([facility])  History of renal insufficiency  Continue hydrocortisone 40 mg a.m., 30 mams p.m. per endocrinology  Endocrinology inputs noted  Hypoglycemia  Present on admission glucose of 40  Hypoglycemia resolved  Endocrinology inputs noted  Hydrocortisone dose increased to 40 mg a.m., 30 mg p.m.  Avoid hypoglycemia, encourage p.o. intake  Monitor Accu-Cheks  Orthostatic hypotension  History of orthostatic hypotension  Monitor orthostatics  Continue midodrine  Avoid hypotension  History of cerebrovascular accident (CVA) in adulthood  Continue aspirin, atorvastatin  Iron deficiency anemia  Monitor hemoglobin    Recent Labs     05/21/25  1521 05/22/25  0537 05/23/25  0549   HGB 9.3* 10.5* 9.5*       CKD (chronic kidney disease) stage 5, GFR less than 15 ml/min ([facility])  Lab Results   Component Value Date    EGFR 81 05/23/2025    EGFR 88 05/22/2025    EGFR 68 05/21/2025    CREATININE 0.95 05/23/2025    CREATININE 0.87 05/22/2025    CREATININE 1.10 05/21/2025   Baseline creatinine 0.9-1.1  Monitor kidney function  Avoid nephrotoxins  SIRS (systemic inflammatory response syndrome) ([facility])  SIRS present on admission  RSV/influenza/COVID-negative  Follow-up on blood cultures  CT chest abdomen pelvis no acute intrathoracic intra-abdominal findings  Monitor counts, temperatures    Chronic heart failure  with preserved ejection fraction (HCC)  Wt Readings from Last 3 Encounters:   05/22/25 87.1 kg (192 lb)     Prior to admission on 20 mg lasix daily  Presently Lasix on hold  2D echo reveals EF 64%, grade 2 diastolic dysfunction  Low-salt diet    PAD (peripheral artery disease) (HCC)  Continue aspirin, atorvastatin  DISH (diffuse idiopathic skeletal hyperostosis)  Noted on admission CT abdomen pelvis as part of trauma workup  Outpatient follow-up  Hypocalcemia  Noted on admission with corrected level of 7.5.  Likely in the setting of CKD 5, as well as vitamin D deficiency.  Monitor  Hypokalemia  Potassium 2.5 today  Replete  Monitor    Type 1 diabetes mellitus ([facility])  Lab Results   Component Value Date    HGBA1C 6.9 (H) 05/21/2025       Recent Labs     05/22/25  1650 05/22/25  2159 05/23/25  0737 05/23/25  1130   POCGLU 201* 281* 226* 341*       Blood Sugar Average: Last 72 hrs:  (P) 138.1214519016162090    Lantus manage daily  Monitor Accu-Cheks  Endocrinology following  Avoid hypoglycemia    Hypomagnesemia  Magnesium 1.9  Monitor        VTE Pharmacologic Prophylaxis:   High Risk (Score >/= 5) - Pharmacological DVT Prophylaxis Ordered: apixaban (Eliquis). Sequential Compression Devices Ordered.    Mobility:   Basic Mobility Inpatient Raw Score: 18  JH-HLM Goal: 6: Walk 10 steps or more  JH-HLM Achieved: 6: Walk 10 steps or more  JH-HLM Goal NOT achieved. Continue with multidisciplinary rounding and encourage appropriate mobility to improve upon JH-HLM goals.    Patient Centered Rounds: I performed bedside rounds with nursing staff today.   Discussions with Specialists or Other Care Team Provider: Case management, physical therapy    Education and Discussions with Family / Patient: Patient, updated daughter Ofelia in detail questions answered.     Current Length of Stay: 1 day(s)  Current Patient Status: Inpatient   Certification Statement: The patient will continue to require additional inpatient hospital stay due  to presents with syncope and collapse hypoglycemia ongoing management as outlined SIRS with fever awaiting blood culture results requires close monitoring  Discharge Plan: Syncope and collapse hypoglycemia SIRS fever ongoing management as outlined    Code Status: Level 1 - Full Code    Subjective     Comfortably in chair  Reports feeling slightly better today  Discussed with patient's personal aide at bedside  Discussed with RN  Encourage incentive spirometry      Objective :  Temp:  [97.3 °F (36.3 °C)-101.3 °F (38.5 °C)] 97.6 °F (36.4 °C)  HR:  [64-99] 65  BP: (123-170)/(57-90) 131/59  Resp:  [12-18] 18  SpO2:  [87 %-99 %] 97 %  O2 Device: None (Room air)    There is no height or weight on file to calculate BMI.     Input and Output Summary (last 24 hours):     Intake/Output Summary (Last 24 hours) at 5/23/2025 1255  Last data filed at 5/22/2025 2300  Gross per 24 hour   Intake 1878.83 ml   Output 1210 ml   Net 668.83 ml       Physical Exam    Comfortably in chair  Neck supple  Lungs diminished breath sounds  Heart sounds S1-S2 noted  Abdomen soft  Awake follows commands  No rash    Lines/Drains:        Telemetry:  Telemetry Orders (From admission, onward)               24 Hour Telemetry Monitoring  Continuous x 24 Hours (Telem)        Expiring   Question:  Reason for 24 Hour Telemetry  Answer:  Metabolic/electrolyte disturbance with high probability of dysrhythmia. K level <3 or >6 OR KCL infusion >10mEq/hr                     Telemetry Reviewed: Sinus rhythm  Indication for Continued Telemetry Use: Metabolic/electrolyte disturbance with high probability of dysrhythmia               Lab Results: I have reviewed the following results:   Results from last 7 days   Lab Units 05/23/25  0549   WBC Thousand/uL 9.36   HEMOGLOBIN g/dL 9.5*   HEMATOCRIT % 31.3*   PLATELETS Thousands/uL 166   SEGS PCT % 74   LYMPHO PCT % 14   MONO PCT % 11   EOS PCT % 1     Results from last 7 days   Lab Units 05/23/25  0549 05/22/25  0537  05/21/25  1521   SODIUM mmol/L 138   < > 138   POTASSIUM mmol/L 2.5*   < > 2.8*   CHLORIDE mmol/L 102   < > 101   CO2 mmol/L 27   < > 28   BUN mg/dL 14   < > 11   CREATININE mg/dL 0.95   < > 1.10   ANION GAP mmol/L 9   < > 9   CALCIUM mg/dL 7.7*   < > 7.0*   ALBUMIN g/dL  --   --  3.4*   TOTAL BILIRUBIN mg/dL  --   --  0.91   ALK PHOS U/L  --   --  136*   ALT U/L  --   --  13   AST U/L  --   --  34   GLUCOSE RANDOM mg/dL 243*   < > 40*    < > = values in this interval not displayed.     Results from last 7 days   Lab Units 05/21/25  1521   INR  1.63*     Results from last 7 days   Lab Units 05/23/25  1130 05/23/25  0737 05/22/25  2159 05/22/25  1650 05/22/25  1112 05/22/25  0839 05/22/25  0209 05/22/25  0000 05/21/25  2228 05/21/25  2120 05/21/25  2003 05/21/25  1756   POC GLUCOSE mg/dl 341* 226* 281* 201* 167* 111 78 68 72 70 68 68     Results from last 7 days   Lab Units 05/21/25  1521   HEMOGLOBIN A1C % 6.9*     Results from last 7 days   Lab Units 05/21/25  1938   LACTIC ACID mmol/L 0.8   PROCALCITONIN ng/ml 0.43*       Recent Cultures (last 7 days):   Results from last 7 days   Lab Units 05/22/25  1722   BLOOD CULTURE  Received in Microbiology Lab. Culture in Progress.  Received in Microbiology Lab. Culture in Progress.       Imaging Results Review: I personally reviewed the following image studies/reports in PACS and discussed pertinent findings with Radiology: chest xray, CT chest, CT abdomen/pelvis, CT head, CT C-spine, and Echocardiogram. My interpretation of the radiology images/reports is: Lab results reviewed.      Last 24 Hours Medication List:     Current Facility-Administered Medications:     acetaminophen (TYLENOL) tablet 650 mg, Q6H PRN    aluminum-magnesium hydroxide-simethicone (MAALOX) oral suspension 30 mL, Q6H PRN    apixaban (ELIQUIS) tablet 5 mg, BID    aspirin chewable tablet 81 mg, Daily    atorvastatin (LIPITOR) tablet 10 mg, Daily With Dinner    Cholecalciferol (VITAMIN D3) tablet 1,000  Units, Daily    fludrocortisone (FLORINEF) tablet 0.2 mg, Daily    hydrocortisone (CORTEF) tablet 40 mg, QAM **AND** hydrocortisone (CORTEF) tablet 30 mg, Daily With Lunch    insulin glargine (LANTUS) subcutaneous injection 5 Units 0.05 mL, HS    insulin lispro (HumALOG/ADMELOG) 100 units/mL subcutaneous injection 1-5 Units, HS    insulin lispro (HumALOG/ADMELOG) 100 units/mL subcutaneous injection 1-6 Units, TID AC **AND** Fingerstick Glucose (POCT), TID AC    midodrine (PROAMATINE) tablet 5 mg, TID AC    ondansetron (ZOFRAN) injection 4 mg, Q6H PRN    potassium chloride 20 mEq IVPB (premix), Q2H, Last Rate: 20 mEq (05/23/25 1158)    senna (SENOKOT) tablet 8.6 mg, Daily    sertraline (ZOLOFT) tablet 100 mg, Daily    Administrative Statements   Today, Patient Was Seen By: Miguelito Martinez MD  I have spent a total time of 31 minutes in caring for this patient on the day of the visit/encounter including Diagnostic results, Risks and benefits of tx options, Instructions for management, Patient and family education, Importance of tx compliance, Risk factor reductions, Impressions, Counseling / Coordination of care, Documenting in the medical record, Reviewing/placing orders in the medical record (including tests, medications, and/or procedures), Obtaining or reviewing history  , and Communicating with other healthcare professionals .    **Please Note: This note may have been constructed using a voice recognition system.**

## 2025-05-24 LAB
ANION GAP SERPL CALCULATED.3IONS-SCNC: 8 MMOL/L (ref 4–13)
BASOPHILS # BLD AUTO: 0.02 THOUSANDS/ÂΜL (ref 0–0.1)
BASOPHILS # BLD AUTO: 0.02 THOUSANDS/ÂΜL (ref 0–0.1)
BASOPHILS NFR BLD AUTO: 0 % (ref 0–1)
BASOPHILS NFR BLD AUTO: 0 % (ref 0–1)
BUN SERPL-MCNC: 15 MG/DL (ref 5–25)
BUN SERPL-MCNC: 15 MG/DL (ref 5–25)
BUN SERPL-MCNC: 16 MG/DL (ref 5–25)
BUN SERPL-MCNC: 16 MG/DL (ref 5–25)
CALCIUM SERPL-MCNC: 8 MG/DL (ref 8.4–10.2)
CALCIUM SERPL-MCNC: 8 MG/DL (ref 8.4–10.2)
CALCIUM SERPL-MCNC: 8.1 MG/DL (ref 8.4–10.2)
CALCIUM SERPL-MCNC: 8.1 MG/DL (ref 8.4–10.2)
CHLORIDE SERPL-SCNC: 102 MMOL/L (ref 96–108)
CHLORIDE SERPL-SCNC: 102 MMOL/L (ref 96–108)
CHLORIDE SERPL-SCNC: 99 MMOL/L (ref 96–108)
CHLORIDE SERPL-SCNC: 99 MMOL/L (ref 96–108)
CO2 SERPL-SCNC: 27 MMOL/L (ref 21–32)
CO2 SERPL-SCNC: 27 MMOL/L (ref 21–32)
CO2 SERPL-SCNC: 29 MMOL/L (ref 21–32)
CO2 SERPL-SCNC: 29 MMOL/L (ref 21–32)
CREAT SERPL-MCNC: 0.87 MG/DL (ref 0.6–1.3)
CREAT SERPL-MCNC: 0.87 MG/DL (ref 0.6–1.3)
CREAT SERPL-MCNC: 1.04 MG/DL (ref 0.6–1.3)
CREAT SERPL-MCNC: 1.04 MG/DL (ref 0.6–1.3)
EOSINOPHIL # BLD AUTO: 0.13 THOUSAND/ÂΜL (ref 0–0.61)
EOSINOPHIL # BLD AUTO: 0.13 THOUSAND/ÂΜL (ref 0–0.61)
EOSINOPHIL NFR BLD AUTO: 1 % (ref 0–6)
EOSINOPHIL NFR BLD AUTO: 1 % (ref 0–6)
ERYTHROCYTE [DISTWIDTH] IN BLOOD BY AUTOMATED COUNT: 15.1 % (ref 11.6–15.1)
ERYTHROCYTE [DISTWIDTH] IN BLOOD BY AUTOMATED COUNT: 15.1 % (ref 11.6–15.1)
GFR SERPL CREATININE-BSD FRML MDRD: 73 ML/MIN/1.73SQ M
GFR SERPL CREATININE-BSD FRML MDRD: 73 ML/MIN/1.73SQ M
GFR SERPL CREATININE-BSD FRML MDRD: 88 ML/MIN/1.73SQ M
GFR SERPL CREATININE-BSD FRML MDRD: 88 ML/MIN/1.73SQ M
GLUCOSE SERPL-MCNC: 163 MG/DL (ref 65–140)
GLUCOSE SERPL-MCNC: 163 MG/DL (ref 65–140)
GLUCOSE SERPL-MCNC: 192 MG/DL (ref 65–140)
GLUCOSE SERPL-MCNC: 192 MG/DL (ref 65–140)
GLUCOSE SERPL-MCNC: 195 MG/DL (ref 65–140)
GLUCOSE SERPL-MCNC: 195 MG/DL (ref 65–140)
GLUCOSE SERPL-MCNC: 267 MG/DL (ref 65–140)
GLUCOSE SERPL-MCNC: 267 MG/DL (ref 65–140)
GLUCOSE SERPL-MCNC: 294 MG/DL (ref 65–140)
GLUCOSE SERPL-MCNC: 294 MG/DL (ref 65–140)
GLUCOSE SERPL-MCNC: 325 MG/DL (ref 65–140)
GLUCOSE SERPL-MCNC: 325 MG/DL (ref 65–140)
HCT VFR BLD AUTO: 30.6 % (ref 36.5–49.3)
HCT VFR BLD AUTO: 30.6 % (ref 36.5–49.3)
HGB BLD-MCNC: 9.7 G/DL (ref 12–17)
HGB BLD-MCNC: 9.7 G/DL (ref 12–17)
IMM GRANULOCYTES # BLD AUTO: 0.06 THOUSAND/UL (ref 0–0.2)
IMM GRANULOCYTES # BLD AUTO: 0.06 THOUSAND/UL (ref 0–0.2)
IMM GRANULOCYTES NFR BLD AUTO: 1 % (ref 0–2)
IMM GRANULOCYTES NFR BLD AUTO: 1 % (ref 0–2)
LYMPHOCYTES # BLD AUTO: 1.58 THOUSANDS/ÂΜL (ref 0.6–4.47)
LYMPHOCYTES # BLD AUTO: 1.58 THOUSANDS/ÂΜL (ref 0.6–4.47)
LYMPHOCYTES NFR BLD AUTO: 16 % (ref 14–44)
LYMPHOCYTES NFR BLD AUTO: 16 % (ref 14–44)
MAGNESIUM SERPL-MCNC: 1.9 MG/DL (ref 1.9–2.7)
MAGNESIUM SERPL-MCNC: 1.9 MG/DL (ref 1.9–2.7)
MCH RBC QN AUTO: 27.4 PG (ref 26.8–34.3)
MCH RBC QN AUTO: 27.4 PG (ref 26.8–34.3)
MCHC RBC AUTO-ENTMCNC: 31.7 G/DL (ref 31.4–37.4)
MCHC RBC AUTO-ENTMCNC: 31.7 G/DL (ref 31.4–37.4)
MCV RBC AUTO: 86 FL (ref 82–98)
MCV RBC AUTO: 86 FL (ref 82–98)
MONOCYTES # BLD AUTO: 1.02 THOUSAND/ÂΜL (ref 0.17–1.22)
MONOCYTES # BLD AUTO: 1.02 THOUSAND/ÂΜL (ref 0.17–1.22)
MONOCYTES NFR BLD AUTO: 10 % (ref 4–12)
MONOCYTES NFR BLD AUTO: 10 % (ref 4–12)
NEUTROPHILS # BLD AUTO: 7.3 THOUSANDS/ÂΜL (ref 1.85–7.62)
NEUTROPHILS # BLD AUTO: 7.3 THOUSANDS/ÂΜL (ref 1.85–7.62)
NEUTS SEG NFR BLD AUTO: 72 % (ref 43–75)
NEUTS SEG NFR BLD AUTO: 72 % (ref 43–75)
NRBC BLD AUTO-RTO: 0 /100 WBCS
NRBC BLD AUTO-RTO: 0 /100 WBCS
PLATELET # BLD AUTO: 180 THOUSANDS/UL (ref 149–390)
PLATELET # BLD AUTO: 180 THOUSANDS/UL (ref 149–390)
PMV BLD AUTO: 11 FL (ref 8.9–12.7)
PMV BLD AUTO: 11 FL (ref 8.9–12.7)
POTASSIUM SERPL-SCNC: 2.7 MMOL/L (ref 3.5–5.3)
POTASSIUM SERPL-SCNC: 2.7 MMOL/L (ref 3.5–5.3)
POTASSIUM SERPL-SCNC: 3 MMOL/L (ref 3.5–5.3)
POTASSIUM SERPL-SCNC: 3 MMOL/L (ref 3.5–5.3)
RBC # BLD AUTO: 3.54 MILLION/UL (ref 3.88–5.62)
RBC # BLD AUTO: 3.54 MILLION/UL (ref 3.88–5.62)
SODIUM SERPL-SCNC: 136 MMOL/L (ref 135–147)
SODIUM SERPL-SCNC: 136 MMOL/L (ref 135–147)
SODIUM SERPL-SCNC: 137 MMOL/L (ref 135–147)
SODIUM SERPL-SCNC: 137 MMOL/L (ref 135–147)
WBC # BLD AUTO: 10.11 THOUSAND/UL (ref 4.31–10.16)
WBC # BLD AUTO: 10.11 THOUSAND/UL (ref 4.31–10.16)

## 2025-05-24 PROCEDURE — 82948 REAGENT STRIP/BLOOD GLUCOSE: CPT

## 2025-05-24 PROCEDURE — 80048 BASIC METABOLIC PNL TOTAL CA: CPT

## 2025-05-24 PROCEDURE — 80048 BASIC METABOLIC PNL TOTAL CA: CPT | Performed by: INTERNAL MEDICINE

## 2025-05-24 PROCEDURE — 99232 SBSQ HOSP IP/OBS MODERATE 35: CPT | Performed by: INTERNAL MEDICINE

## 2025-05-24 PROCEDURE — 83735 ASSAY OF MAGNESIUM: CPT

## 2025-05-24 PROCEDURE — 85025 COMPLETE CBC W/AUTO DIFF WBC: CPT

## 2025-05-24 RX ORDER — POTASSIUM CHLORIDE 14.9 MG/ML
20 INJECTION INTRAVENOUS
Status: COMPLETED | OUTPATIENT
Start: 2025-05-24 | End: 2025-05-24

## 2025-05-24 RX ORDER — MAGNESIUM SULFATE HEPTAHYDRATE 40 MG/ML
2 INJECTION, SOLUTION INTRAVENOUS ONCE
Status: COMPLETED | OUTPATIENT
Start: 2025-05-24 | End: 2025-05-24

## 2025-05-24 RX ADMIN — ASPIRIN 81 MG CHEWABLE TABLET 81 MG: 81 TABLET CHEWABLE at 08:01

## 2025-05-24 RX ADMIN — INSULIN LISPRO 3 UNITS: 100 INJECTION, SOLUTION INTRAVENOUS; SUBCUTANEOUS at 16:59

## 2025-05-24 RX ADMIN — HYDROCORTISONE 20 MG: 20 TABLET ORAL at 11:35

## 2025-05-24 RX ADMIN — HYDROCORTISONE 30 MG: 10 TABLET ORAL at 08:03

## 2025-05-24 RX ADMIN — FLUDROCORTISONE ACETATE 0.2 MG: 0.1 TABLET ORAL at 08:02

## 2025-05-24 RX ADMIN — POTASSIUM CHLORIDE 20 MEQ: 14.9 INJECTION, SOLUTION INTRAVENOUS at 10:17

## 2025-05-24 RX ADMIN — INSULIN LISPRO 2 UNITS: 100 INJECTION, SOLUTION INTRAVENOUS; SUBCUTANEOUS at 22:04

## 2025-05-24 RX ADMIN — MAGNESIUM SULFATE HEPTAHYDRATE 2 G: 40 INJECTION, SOLUTION INTRAVENOUS at 10:17

## 2025-05-24 RX ADMIN — INSULIN LISPRO 3 UNITS: 100 INJECTION, SOLUTION INTRAVENOUS; SUBCUTANEOUS at 07:57

## 2025-05-24 RX ADMIN — ATORVASTATIN CALCIUM 10 MG: 10 TABLET, FILM COATED ORAL at 17:00

## 2025-05-24 RX ADMIN — MIDODRINE HYDROCHLORIDE 5 MG: 5 TABLET ORAL at 17:00

## 2025-05-24 RX ADMIN — APIXABAN 5 MG: 5 TABLET, FILM COATED ORAL at 08:01

## 2025-05-24 RX ADMIN — INSULIN GLARGINE 10 UNITS: 100 INJECTION, SOLUTION SUBCUTANEOUS at 22:03

## 2025-05-24 RX ADMIN — MIDODRINE HYDROCHLORIDE 5 MG: 5 TABLET ORAL at 11:34

## 2025-05-24 RX ADMIN — SERTRALINE HYDROCHLORIDE 100 MG: 100 TABLET ORAL at 08:01

## 2025-05-24 RX ADMIN — INSULIN LISPRO 3 UNITS: 100 INJECTION, SOLUTION INTRAVENOUS; SUBCUTANEOUS at 11:35

## 2025-05-24 RX ADMIN — POTASSIUM CHLORIDE 20 MEQ: 14.9 INJECTION, SOLUTION INTRAVENOUS at 12:25

## 2025-05-24 RX ADMIN — APIXABAN 5 MG: 5 TABLET, FILM COATED ORAL at 17:00

## 2025-05-24 RX ADMIN — MIDODRINE HYDROCHLORIDE 5 MG: 5 TABLET ORAL at 07:56

## 2025-05-24 RX ADMIN — Medication 1000 UNITS: at 08:01

## 2025-05-24 RX ADMIN — INSULIN LISPRO 2 UNITS: 100 INJECTION, SOLUTION INTRAVENOUS; SUBCUTANEOUS at 07:56

## 2025-05-24 RX ADMIN — INSULIN LISPRO 2 UNITS: 100 INJECTION, SOLUTION INTRAVENOUS; SUBCUTANEOUS at 11:34

## 2025-05-24 NOTE — ASSESSMENT & PLAN NOTE
Monitor hemoglobin    Recent Labs     05/22/25  0537 05/23/25  0549 05/24/25  0522   HGB 10.5* 9.5* 9.7*

## 2025-05-24 NOTE — PLAN OF CARE
Problem: PAIN - ADULT  Goal: Verbalizes/displays adequate comfort level or baseline comfort level  Description: Interventions:  - Encourage patient to monitor pain and request assistance  - Assess pain using appropriate pain scale  - Administer analgesics as ordered based on type and severity of pain and evaluate response  - Implement non-pharmacological measures as appropriate and evaluate response  - Consider cultural and social influences on pain and pain management  - Notify physician/advanced practitioner if interventions unsuccessful or patient reports new pain  - Educate patient/family on pain management process including their role and importance of  reporting pain   - Provide non-pharmacologic/complimentary pain relief interventions  Outcome: Progressing     Problem: INFECTION - ADULT  Goal: Absence or prevention of progression during hospitalization  Description: INTERVENTIONS:  - Assess and monitor for signs and symptoms of infection  - Monitor lab/diagnostic results  - Monitor all insertion sites, i.e. indwelling lines, tubes, and drains  - Monitor endotracheal if appropriate and nasal secretions for changes in amount and color  - [place] appropriate cooling/warming therapies per order  - Administer medications as ordered  - Instruct and encourage patient and family to use good hand hygiene technique  - Identify and instruct in appropriate isolation precautions for identified infection/condition  Outcome: Progressing     Problem: SAFETY ADULT  Goal: Patient will remain free of falls  Description: INTERVENTIONS:  - Educate patient/family on patient safety including physical limitations  - Instruct patient to call for assistance with activity   - Consider consulting OT/PT to assist with strengthening/mobility based on AM PAC & JH-HLM score  - Consult OT/PT to assist with strengthening/mobility   - Keep Call bell within reach  - Keep bed low and locked with side rails adjusted as appropriate  - Keep  care items and personal belongings within reach  - Initiate and maintain comfort rounds  - Make Fall Risk Sign visible to staff  - Offer Toileting every 2 Hours, in advance of need  - Initiate/Maintain bed alarm  - Obtain necessary fall risk management equipment: bed alarm  - Apply yellow socks and bracelet for high fall risk patients  - Consider moving patient to room near nurses station  Outcome: Progressing  Goal: Maintain or return to baseline ADL function  Description: INTERVENTIONS:  -  Assess patient's ability to carry out ADLs; assess patient's baseline for ADL function and identify physical deficits which impact ability to perform ADLs (bathing, care of mouth/teeth, toileting, grooming, dressing, etc.)  - Assess/evaluate cause of self-care deficits   - Assess range of motion  - Assess patient's mobility; develop plan if impaired  - Assess patient's need for assistive devices and provide as appropriate  - Encourage maximum independence but intervene and supervise when necessary  - Involve family in performance of ADLs  - Assess for home care needs following discharge   - Consider OT consult to assist with ADL evaluation and planning for discharge  - Provide patient education as appropriate  - Monitor functional capacity and physical performance, use of AM PAC & JH-HLM   - Monitor gait, balance and fatigue with ambulation    Outcome: Progressing  Goal: Maintains/Returns to pre admission functional level  Description: INTERVENTIONS:  - Perform AM-PAC 6 Click Basic Mobility/ Daily Activity assessment daily.  - Set and communicate daily mobility goal to care team and patient/family/caregiver.   - Collaborate with rehabilitation services on mobility goals if consulted  - Perform Range of Motion 3 times a day.  - Reposition patient every 2 hours.  - Dangle patient 3 times a day  - Stand patient 2 times a day  - Ambulate patient 3 times a day  - Out of bed to chair 3 times a day   - Out of bed for meals 3 times a  day  - Out of bed for toileting  - Record patient progress and toleration of activity level   Outcome: Progressing     Problem: DISCHARGE PLANNING  Goal: Discharge to home or other facility with appropriate resources  Description: INTERVENTIONS:  - Identify barriers to discharge w/patient and caregiver  - Arrange for needed discharge resources and transportation as appropriate  - Identify discharge learning needs (meds, wound care, etc.)  - Arrange for interpretive services to assist at discharge as needed  - Refer to Case Management Department for coordinating discharge planning if the patient needs post-hospital services based on physician/advanced practitioner order or complex needs related to functional status, cognitive ability, or social support system  Outcome: Progressing     Problem: Knowledge Deficit  Goal: Patient/family/caregiver demonstrates understanding of disease process, treatment plan, medications, and discharge instructions  Description: Complete learning assessment and assess knowledge base.  Interventions:  - Provide teaching at level of understanding  - Provide teaching via preferred learning methods  Outcome: Progressing     Problem: Prexisting or High Potential for Compromised Skin Integrity  Goal: Skin integrity is maintained or improved  Description: INTERVENTIONS:  - Identify patients at risk for skin breakdown  - Assess and monitor skin integrity including under and around medical devices   - Assess and monitor nutrition and hydration status  - Monitor labs  - Assess for incontinence   - Turn and reposition patient  - Assist with mobility/ambulation  - Relieve pressure over rea prominences   - Avoid friction and shearing  - Provide appropriate hygiene as needed including keeping skin clean and dry  - Evaluate need for skin moisturizer/barrier cream  - Collaborate with interdisciplinary team  - Patient/family teaching  - Consider wound care consult

## 2025-05-24 NOTE — ASSESSMENT & PLAN NOTE
History of orthostatic hypotension  Continue midodrine  Continue fludrocortisone  Safe ambulation encouraged  Avoid hypotension

## 2025-05-24 NOTE — ASSESSMENT & PLAN NOTE
Lab Results   Component Value Date    HGBA1C 6.9 (H) 05/21/2025       Recent Labs     05/23/25  1701 05/23/25  2113 05/24/25  0755 05/24/25  1122   POCGLU 376* 350* 195* 192*       Blood Sugar Average: Last 72 hrs:  (P) 169.7218345341810686    Insulin therapy per endocrinology  Monitor Accu-Cheks  Endocrinology following  Avoid hypoglycemia  Hypoglycemia protocol in place

## 2025-05-24 NOTE — PROGRESS NOTES
Progress Note - Hospitalist   Name: Karson You 70 y.o. male I MRN: [id number]  [facility] I Date of Admission: 5/21/2025   Date of Service: 5/24/2025 I Hospital Day: 2     Assessment & Plan  Syncope and collapse  Patient presents syncope and collapse noted to have hypoglycemia with glucose of 40  Likely multifactorial  Patient with history of internal insufficiency and orthostatic hypotension  2D echo EF 64% grade 2 diastolic dysfunction, aortic valve sclerosis reported  Received IV fluids  Symptomatically improved  Monitor orthostatics  Supportive cares    Paroxysmal A-fib (HCC)  Continue Eliquis anticoagulation  Adrenal insufficiency (HCC)  History of renal insufficiency  Present on hydrocortisone 30 mg a.m. and 20 mg p.m.  Endocrinology inputs noted  Hypoglycemia  Present on admission glucose of 40  Hypoglycemia resolved  Endocrinology inputs noted  Monitor Accu-Cheks  Encourage p.o. intake  Avoid hypoglycemia  Hypoglycemia protocol in place  Orthostatic hypotension  History of orthostatic hypotension  Continue midodrine  Continue fludrocortisone  Safe ambulation encouraged  Avoid hypotension    History of cerebrovascular accident (CVA) in adulthood  Continue aspirin, atorvastatin  Iron deficiency anemia  Monitor hemoglobin    Recent Labs     05/22/25  0537 05/23/25  0549 05/24/25  0522   HGB 10.5* 9.5* 9.7*       CKD (chronic kidney disease) stage 5, GFR less than 15 ml/min ([facility])  Lab Results   Component Value Date    EGFR 88 05/24/2025    EGFR 82 05/23/2025    EGFR 81 05/23/2025    CREATININE 0.87 05/24/2025    CREATININE 0.94 05/23/2025    CREATININE 0.95 05/23/2025   Baseline creatinine 0.9-1.1  Monitor kidney function  Avoid nephrotoxins  SIRS (systemic inflammatory response syndrome) ([facility])  SIRS present on admission  RSV/influenza/COVID-negative  Blood cultures negative x 2 at 24 hours  CT chest abdomen pelvis no acute intrathoracic intra-abdominal findings  Monitor counts,  temperatures    Chronic heart failure with preserved ejection fraction (HCC)  Wt Readings from Last 3 Encounters:   05/22/25 87.1 kg (192 lb)     Prior to admission on 20 mg lasix daily  Presently Lasix on hold  2D echo reveals EF 64%, grade 2 diastolic dysfunction  Continue low-salt diet    PAD (peripheral artery disease) (HCC)  Continue aspirin, atorvastatin  DISH (diffuse idiopathic skeletal hyperostosis)  Noted on admission CT abdomen pelvis as part of trauma workup  Outpatient follow-up  Hypocalcemia  Noted on admission with corrected level of 7.5.  Likely in the setting of CKD 5, as well as vitamin D deficiency.  Monitor  Hypokalemia  Potassium 2.7 today  Replete  Monitor    Type 1 diabetes mellitus ([facility])  Lab Results   Component Value Date    HGBA1C 6.9 (H) 05/21/2025       Recent Labs     05/23/25  1701 05/23/25  2113 05/24/25  0755 05/24/25  1122   POCGLU 376* 350* 195* 192*       Blood Sugar Average: Last 72 hrs:  (P) 169.1615320646216525    Insulin therapy per endocrinology  Monitor Accu-Cheks  Endocrinology following  Avoid hypoglycemia  Hypoglycemia protocol in place    Hypomagnesemia  Magnesium 1.9  Monitor            VTE Pharmacologic Prophylaxis:   High Risk (Score >/= 5) - Pharmacological DVT Prophylaxis Ordered: apixaban (Eliquis). Sequential Compression Devices Ordered.    Mobility:   Basic Mobility Inpatient Raw Score: 15  JH-HLM Goal: 4: Move to chair/commode  JH-HLM Achieved: 6: Walk 10 steps or more  JH-HLM Goal NOT achieved. Continue with multidisciplinary rounding and encourage appropriate mobility to improve upon JH-HLM goals.    Patient Centered Rounds: I performed bedside rounds with nursing staff today.   Discussions with Specialists or Other Care Team Provider: Case management    Education and Discussions with Family / Patient: Discussed with the patient, updated daughter Ofelia questions answered.     Current Length of Stay: 2 day(s)  Current Patient Status: Inpatient    Certification Statement: The patient will continue to require additional inpatient hospital stay due to presents with syncope and collapse now with hypokalemia receiving electrolyte repletion today  Discharge Plan: Awaiting clinical and sporadic improvement possible discharge 2448 hrs.    Code Status: Level 1 - Full Code    Subjective       Comfortably in chair  Reports overall feeling better  Remains afebrile  Appetite fair  Discussed with RN at bedside  Encourage incentive spirometry      Objective :  Temp:  [97.5 °F (36.4 °C)-98.1 °F (36.7 °C)] 97.5 °F (36.4 °C)  HR:  [64-71] 67  BP: (117-167)/(75-92) 129/75  Resp:  [18-20] 20  SpO2:  [97 %-100 %] 97 %    There is no height or weight on file to calculate BMI.     Input and Output Summary (last 24 hours):     Intake/Output Summary (Last 24 hours) at 5/24/2025 1253  Last data filed at 5/24/2025 1231  Gross per 24 hour   Intake 1180 ml   Output 2850 ml   Net -1670 ml       Physical Exam      Comfortably in chair  Neck supple  Lungs diminished breath of the bases  Heart sounds S1-S2 noted  Abdomen soft  Awake follows commands  No rash    Lines/Drains:        Telemetry:  Telemetry Orders (From admission, onward)               24 Hour Telemetry Monitoring  Continuous x 24 Hours (Telem)        Expiring   Question:  Reason for 24 Hour Telemetry  Answer:  Metabolic/electrolyte disturbance with high probability of dysrhythmia. K level <3 or >6 OR KCL infusion >10mEq/hr                     Telemetry Reviewed: Sinus rhythm  Indication for Continued Telemetry Use: Metabolic/electrolyte disturbance with high probability of dysrhythmia               Lab Results: I have reviewed the following results:   Results from last 7 days   Lab Units 05/24/25  0522   WBC Thousand/uL 10.11   HEMOGLOBIN g/dL 9.7*   HEMATOCRIT % 30.6*   PLATELETS Thousands/uL 180   SEGS PCT % 72   LYMPHO PCT % 16   MONO PCT % 10   EOS PCT % 1     Results from last 7 days   Lab Units 05/24/25  0522  05/22/25  0537 05/21/25  1521   SODIUM mmol/L 137   < > 138   POTASSIUM mmol/L 2.7*   < > 2.8*   CHLORIDE mmol/L 102   < > 101   CO2 mmol/L 27   < > 28   BUN mg/dL 15   < > 11   CREATININE mg/dL 0.87   < > 1.10   ANION GAP mmol/L 8   < > 9   CALCIUM mg/dL 8.1*   < > 7.0*   ALBUMIN g/dL  --   --  3.4*   TOTAL BILIRUBIN mg/dL  --   --  0.91   ALK PHOS U/L  --   --  136*   ALT U/L  --   --  13   AST U/L  --   --  34   GLUCOSE RANDOM mg/dL 163*   < > 40*    < > = values in this interval not displayed.     Results from last 7 days   Lab Units 05/21/25  1521   INR  1.63*     Results from last 7 days   Lab Units 05/24/25  1122 05/24/25  0755 05/23/25  2113 05/23/25  1701 05/23/25  1130 05/23/25  0737 05/22/25  2159 05/22/25  1650 05/22/25  1112 05/22/25  0839 05/22/25  0209 05/22/25  0000   POC GLUCOSE mg/dl 192* 195* 350* 376* 341* 226* 281* 201* 167* 111 78 68     Results from last 7 days   Lab Units 05/21/25  1521   HEMOGLOBIN A1C % 6.9*     Results from last 7 days   Lab Units 05/21/25  1938   LACTIC ACID mmol/L 0.8   PROCALCITONIN ng/ml 0.43*       Recent Cultures (last 7 days):   Results from last 7 days   Lab Units 05/22/25  1722   BLOOD CULTURE  No Growth at 24 hrs.  No Growth at 24 hrs.       Imaging Results Review: I personally reviewed the following image studies/reports in PACS and discussed pertinent findings with Radiology: CT chest and CT abdomen/pelvis. My interpretation of the radiology images/reports is: Lab results reviewed.      Last 24 Hours Medication List:     Current Facility-Administered Medications:     acetaminophen (TYLENOL) tablet 650 mg, Q6H PRN    aluminum-magnesium hydroxide-simethicone (MAALOX) oral suspension 30 mL, Q6H PRN    apixaban (ELIQUIS) tablet 5 mg, BID    aspirin chewable tablet 81 mg, Daily    atorvastatin (LIPITOR) tablet 10 mg, Daily With Dinner    Cholecalciferol (VITAMIN D3) tablet 1,000 Units, Daily    fludrocortisone (FLORINEF) tablet 0.2 mg, Daily    hydrocortisone  (CORTEF) tablet 30 mg, QAM **AND** hydrocortisone (CORTEF) tablet 20 mg, Daily With Lunch    insulin glargine (LANTUS) subcutaneous injection 10 Units 0.1 mL, HS    insulin lispro (HumALOG/ADMELOG) 100 units/mL subcutaneous injection 1-5 Units, HS    insulin lispro (HumALOG/ADMELOG) 100 units/mL subcutaneous injection 1-6 Units, TID AC **AND** Fingerstick Glucose (POCT), TID AC    insulin lispro (HumALOG/ADMELOG) 100 units/mL subcutaneous injection 3 Units, TID With Meals    midodrine (PROAMATINE) tablet 5 mg, TID AC    ondansetron (ZOFRAN) injection 4 mg, Q6H PRN    potassium chloride 20 mEq IVPB (premix), Q2H, Last Rate: 20 mEq (05/24/25 1225)    senna (SENOKOT) tablet 8.6 mg, Daily    sertraline (ZOLOFT) tablet 100 mg, Daily    Administrative Statements   Today, Patient Was Seen By: Miguelito Martinez MD  I have spent a total time of 32 minutes in caring for this patient on the day of the visit/encounter including Diagnostic results, Risks and benefits of tx options, Instructions for management, Patient and family education, Importance of tx compliance, Risk factor reductions, Impressions, Counseling / Coordination of care, Documenting in the medical record, Reviewing/placing orders in the medical record (including tests, medications, and/or procedures), Obtaining or reviewing history  , and Communicating with other healthcare professionals .    **Please Note: This note may have been constructed using a voice recognition system.**

## 2025-05-24 NOTE — ASSESSMENT & PLAN NOTE
History of renal insufficiency  Present on hydrocortisone 30 mg a.m. and 20 mg p.m.  Endocrinology inputs noted

## 2025-05-24 NOTE — ASSESSMENT & PLAN NOTE
Present on admission glucose of 40  Hypoglycemia resolved  Endocrinology inputs noted  Monitor Accu-Cheks  Encourage p.o. intake  Avoid hypoglycemia  Hypoglycemia protocol in place

## 2025-05-24 NOTE — ASSESSMENT & PLAN NOTE
Lab Results   Component Value Date    EGFR 88 05/24/2025    EGFR 82 05/23/2025    EGFR 81 05/23/2025    CREATININE 0.87 05/24/2025    CREATININE 0.94 05/23/2025    CREATININE 0.95 05/23/2025   Baseline creatinine 0.9-1.1  Monitor kidney function  Avoid nephrotoxins

## 2025-05-24 NOTE — ASSESSMENT & PLAN NOTE
Wt Readings from Last 3 Encounters:   05/22/25 87.1 kg (192 lb)     Prior to admission on 20 mg lasix daily  Presently Lasix on hold  2D echo reveals EF 64%, grade 2 diastolic dysfunction  Continue low-salt diet

## 2025-05-24 NOTE — ASSESSMENT & PLAN NOTE
SIRS present on admission  RSV/influenza/COVID-negative  Blood cultures negative x 2 at 24 hours  CT chest abdomen pelvis no acute intrathoracic intra-abdominal findings  Monitor counts, temperatures

## 2025-05-24 NOTE — PLAN OF CARE
Problem: PAIN - ADULT  Goal: Verbalizes/displays adequate comfort level or baseline comfort level  Description: Interventions:  - Encourage patient to monitor pain and request assistance  - Assess pain using appropriate pain scale  - Administer analgesics as ordered based on type and severity of pain and evaluate response  - Implement non-pharmacological measures as appropriate and evaluate response  - Consider cultural and social influences on pain and pain management  - Notify physician/advanced practitioner if interventions unsuccessful or patient reports new pain  - Educate patient/family on pain management process including their role and importance of  reporting pain   - Provide non-pharmacologic/complimentary pain relief interventions  Outcome: Progressing     Problem: INFECTION - ADULT  Goal: Absence or prevention of progression during hospitalization  Description: INTERVENTIONS:  - Assess and monitor for signs and symptoms of infection  - Monitor lab/diagnostic results  - Monitor all insertion sites, i.e. indwelling lines, tubes, and drains  - Monitor endotracheal if appropriate and nasal secretions for changes in amount and color  - [place] appropriate cooling/warming therapies per order  - Administer medications as ordered  - Instruct and encourage patient and family to use good hand hygiene technique  - Identify and instruct in appropriate isolation precautions for identified infection/condition  Outcome: Progressing

## 2025-05-24 NOTE — ASSESSMENT & PLAN NOTE
Patient presents syncope and collapse noted to have hypoglycemia with glucose of 40  Likely multifactorial  Patient with history of internal insufficiency and orthostatic hypotension  2D echo EF 64% grade 2 diastolic dysfunction, aortic valve sclerosis reported  Received IV fluids  Symptomatically improved  Monitor orthostatics  Supportive cares

## 2025-05-24 NOTE — CASE MANAGEMENT
Case Management Discharge Planning Note    Patient name Karson You  Location [facility]-01 MRN [id number]  : 1955 Date 2025       Current Admission Date: 2025  Current Admission Diagnosis:Syncope and collapse   Patient Active Problem List    Diagnosis Date Noted    Type 1 diabetes mellitus (HCC) 2025    Hypomagnesemia 2025    Paroxysmal A-fib ([facility]) 2025    Adrenal insufficiency ([facility]) 2025    Hypoglycemia 2025    Syncope and collapse 2025    Orthostatic hypotension 2025    History of cerebrovascular accident (CVA) in adulthood 2025    Iron deficiency anemia 2025    CKD (chronic kidney disease) stage 5, GFR less than 15 ml/min ([facility]) 2025    Chronic heart failure with preserved ejection fraction ([facility]) 2025    PAD (peripheral artery disease) ([facility]) 2025    DISH (diffuse idiopathic skeletal hyperostosis) 2025    Hypocalcemia 2025    Hypokalemia 2025    SIRS (systemic inflammatory response syndrome) ([facility]) 2025      LOS (days): 2  Geometric Mean LOS (GMLOS) (days): 3.4  Days to GMLOS:1.6     OBJECTIVE:  Risk of Unplanned Readmission Score: 17.09         Current admission status: Inpatient   Preferred Pharmacy:   PATIENT/FAMILY REPORTS NO PREFERRED PHARMACY  No address on file      Primary Care Provider: No primary care provider on file.    [facility]  Secondary Insurance:     DISCHARGE DETAILS:           Per provider, anticipate d/c 24-48 hours pending endocrine clearance  [facility] reserved for post acute needs

## 2025-05-25 LAB
ANION GAP SERPL CALCULATED.3IONS-SCNC: 7 MMOL/L (ref 4–13)
BUN SERPL-MCNC: 16 MG/DL (ref 5–25)
CALCIUM SERPL-MCNC: 8.1 MG/DL (ref 8.4–10.2)
CHLORIDE SERPL-SCNC: 102 MMOL/L (ref 96–108)
CO2 SERPL-SCNC: 29 MMOL/L (ref 21–32)
CREAT SERPL-MCNC: 1.03 MG/DL (ref 0.6–1.3)
GFR SERPL CREATININE-BSD FRML MDRD: 74 ML/MIN/1.73SQ M
GLUCOSE SERPL-MCNC: 124 MG/DL (ref 65–140)
GLUCOSE SERPL-MCNC: 168 MG/DL (ref 65–140)
GLUCOSE SERPL-MCNC: 171 MG/DL (ref 65–140)
GLUCOSE SERPL-MCNC: 183 MG/DL (ref 65–140)
GLUCOSE SERPL-MCNC: 228 MG/DL (ref 65–140)
MAGNESIUM SERPL-MCNC: 2.1 MG/DL (ref 1.9–2.7)
PHOSPHATE SERPL-MCNC: 2.7 MG/DL (ref 2.3–4.1)
POTASSIUM SERPL-SCNC: 2.7 MMOL/L (ref 3.5–5.3)
SODIUM SERPL-SCNC: 138 MMOL/L (ref 135–147)

## 2025-05-25 PROCEDURE — 82948 REAGENT STRIP/BLOOD GLUCOSE: CPT

## 2025-05-25 PROCEDURE — 99232 SBSQ HOSP IP/OBS MODERATE 35: CPT | Performed by: INTERNAL MEDICINE

## 2025-05-25 PROCEDURE — 80048 BASIC METABOLIC PNL TOTAL CA: CPT | Performed by: INTERNAL MEDICINE

## 2025-05-25 PROCEDURE — 84100 ASSAY OF PHOSPHORUS: CPT | Performed by: INTERNAL MEDICINE

## 2025-05-25 PROCEDURE — 83735 ASSAY OF MAGNESIUM: CPT | Performed by: INTERNAL MEDICINE

## 2025-05-25 RX ORDER — FLUDROCORTISONE ACETATE 0.1 MG/1
0.2 TABLET ORAL DAILY
Start: 2025-05-26

## 2025-05-25 RX ORDER — HYDROCORTISONE 20 MG/1
20 TABLET ORAL EVERY MORNING
Status: DISCONTINUED | OUTPATIENT
Start: 2025-05-26 | End: 2025-05-26

## 2025-05-25 RX ORDER — SERTRALINE HYDROCHLORIDE 100 MG/1
100 TABLET, FILM COATED ORAL DAILY
Start: 2025-05-26

## 2025-05-25 RX ORDER — MAGNESIUM SULFATE HEPTAHYDRATE 40 MG/ML
2 INJECTION, SOLUTION INTRAVENOUS ONCE
Status: COMPLETED | OUTPATIENT
Start: 2025-05-25 | End: 2025-05-26

## 2025-05-25 RX ORDER — HYDROCORTISONE 5 MG/1
TABLET ORAL
Start: 2025-05-26 | End: 2025-05-28

## 2025-05-25 RX ORDER — INSULIN LISPRO 100 [IU]/ML
3 INJECTION, SOLUTION INTRAVENOUS; SUBCUTANEOUS
Start: 2025-05-25 | End: 2025-05-28

## 2025-05-25 RX ORDER — POTASSIUM CHLORIDE 14.9 MG/ML
20 INJECTION INTRAVENOUS
Status: COMPLETED | OUTPATIENT
Start: 2025-05-25 | End: 2025-05-26

## 2025-05-25 RX ORDER — MIDODRINE HYDROCHLORIDE 5 MG/1
5 TABLET ORAL
Start: 2025-05-25

## 2025-05-25 RX ORDER — ATORVASTATIN CALCIUM 10 MG/1
10 TABLET, FILM COATED ORAL
Start: 2025-05-25

## 2025-05-25 RX ORDER — ASPIRIN 81 MG/1
81 TABLET, CHEWABLE ORAL DAILY
Start: 2025-05-26

## 2025-05-25 RX ORDER — INSULIN GLARGINE 100 [IU]/ML
10 INJECTION, SOLUTION SUBCUTANEOUS
Start: 2025-05-25 | End: 2025-05-28

## 2025-05-25 RX ORDER — POTASSIUM CHLORIDE 1500 MG/1
40 TABLET, EXTENDED RELEASE ORAL 2 TIMES DAILY
Status: DISCONTINUED | OUTPATIENT
Start: 2025-05-25 | End: 2025-05-26

## 2025-05-25 RX ORDER — SENNOSIDES 8.6 MG
8.6 TABLET ORAL DAILY
Start: 2025-05-26

## 2025-05-25 RX ADMIN — HYDROCORTISONE 30 MG: 10 TABLET ORAL at 08:07

## 2025-05-25 RX ADMIN — Medication 1000 UNITS: at 08:07

## 2025-05-25 RX ADMIN — SENNOSIDES 8.6 MG: 8.6 TABLET, FILM COATED ORAL at 08:08

## 2025-05-25 RX ADMIN — APIXABAN 5 MG: 5 TABLET, FILM COATED ORAL at 17:38

## 2025-05-25 RX ADMIN — MAGNESIUM SULFATE HEPTAHYDRATE 2 G: 40 INJECTION, SOLUTION INTRAVENOUS at 14:00

## 2025-05-25 RX ADMIN — INSULIN LISPRO 3 UNITS: 100 INJECTION, SOLUTION INTRAVENOUS; SUBCUTANEOUS at 08:12

## 2025-05-25 RX ADMIN — INSULIN LISPRO 2 UNITS: 100 INJECTION, SOLUTION INTRAVENOUS; SUBCUTANEOUS at 21:11

## 2025-05-25 RX ADMIN — INSULIN GLARGINE 10 UNITS: 100 INJECTION, SOLUTION SUBCUTANEOUS at 21:10

## 2025-05-25 RX ADMIN — APIXABAN 5 MG: 5 TABLET, FILM COATED ORAL at 08:07

## 2025-05-25 RX ADMIN — SERTRALINE HYDROCHLORIDE 100 MG: 100 TABLET ORAL at 08:07

## 2025-05-25 RX ADMIN — POTASSIUM CHLORIDE 40 MEQ: 1500 TABLET, EXTENDED RELEASE ORAL at 17:39

## 2025-05-25 RX ADMIN — MIDODRINE HYDROCHLORIDE 5 MG: 5 TABLET ORAL at 08:07

## 2025-05-25 RX ADMIN — POTASSIUM CHLORIDE 20 MEQ: 14.9 INJECTION, SOLUTION INTRAVENOUS at 15:38

## 2025-05-25 RX ADMIN — POTASSIUM CHLORIDE 20 MEQ: 14.9 INJECTION, SOLUTION INTRAVENOUS at 14:00

## 2025-05-25 RX ADMIN — MIDODRINE HYDROCHLORIDE 5 MG: 5 TABLET ORAL at 11:53

## 2025-05-25 RX ADMIN — ATORVASTATIN CALCIUM 10 MG: 10 TABLET, FILM COATED ORAL at 16:18

## 2025-05-25 RX ADMIN — INSULIN LISPRO 3 UNITS: 100 INJECTION, SOLUTION INTRAVENOUS; SUBCUTANEOUS at 16:18

## 2025-05-25 RX ADMIN — INSULIN LISPRO 1 UNITS: 100 INJECTION, SOLUTION INTRAVENOUS; SUBCUTANEOUS at 11:53

## 2025-05-25 RX ADMIN — FLUDROCORTISONE ACETATE 0.2 MG: 0.1 TABLET ORAL at 08:07

## 2025-05-25 RX ADMIN — INSULIN LISPRO 3 UNITS: 100 INJECTION, SOLUTION INTRAVENOUS; SUBCUTANEOUS at 11:54

## 2025-05-25 RX ADMIN — ACETAMINOPHEN 650 MG: 325 TABLET ORAL at 18:54

## 2025-05-25 RX ADMIN — INSULIN LISPRO 1 UNITS: 100 INJECTION, SOLUTION INTRAVENOUS; SUBCUTANEOUS at 08:08

## 2025-05-25 RX ADMIN — ASPIRIN 81 MG CHEWABLE TABLET 81 MG: 81 TABLET CHEWABLE at 08:07

## 2025-05-25 NOTE — ASSESSMENT & PLAN NOTE
Lab Results   Component Value Date    HGBA1C 6.9 (H) 05/21/2025       Recent Labs     05/24/25  1622 05/24/25  2159 05/25/25  0619 05/25/25  1135   POCGLU 294* 267* 183* 171*       Blood Sugar Average: Last 72 hrs:  (P) 218.8125    Insulin therapy per endocrinology  Monitor Accu-Cheks  Avoid hypoglycemia  Hypoglycemia protocol in place

## 2025-05-25 NOTE — DISCHARGE SUMMARY
Discharge Summary - Hospitalist   Name: Karson You 70 y.o. male I MRN: [id number]  Unit/Bed#: [facility] 815-01 I Date of Admission: 5/21/2025   Date of Service: 5/25/2025 I Hospital Day: 3     Assessment & Plan  Syncope and collapse  Patient presents syncope and collapse noted to have hypoglycemia with glucose of 40  Likely multifactorial  Patient with history of internal insufficiency and orthostatic hypotension  2D echo EF 64% grade 2 diastolic dysfunction, aortic valve sclerosis reported  Received IV fluids  Symptomatically improved  Supportive cares  Safe ambulation discussed and encouraged  Paroxysmal A-fib (HCC)  Continue Eliquis for anticoagulation  Adrenal insufficiency (HCC)  History of renal insufficiency  Continue hydrocortisone 20 mg a.m., 15 mg p.m.  Endocrinology inputs noted  Outpatient follow-up  Hypoglycemia  Present on admission glucose of 40  Hypoglycemia resolved  Encourage adequate p.o. intake  Accu-Chek monitoring and hypoglycemia remedial measures encouraged  Orthostatic hypotension  History of orthostatic hypotension  Continue midodrine  Continue fludrocortisone  Encourage abdominal binder, compression stockings  Safe ambulation encouraged  Avoid hypotension    History of cerebrovascular accident (CVA) in adulthood  Continue aspirin, atorvastatin  Iron deficiency anemia  Monitor hemoglobin    Recent Labs     05/23/25  0549 05/24/25  0522   HGB 9.5* 9.7*       CKD (chronic kidney disease) stage 5, GFR less than 15 ml/min ([facility])  Lab Results   Component Value Date    EGFR 74 05/25/2025    EGFR 73 05/24/2025    EGFR 88 05/24/2025    CREATININE 1.03 05/25/2025    CREATININE 1.04 05/24/2025    CREATININE 0.87 05/24/2025   Baseline creatinine 0.9-1.1  Monitor kidney function  Outpatient follow-up  SIRS (systemic inflammatory response syndrome) ([facility])  SIRS present on admission  RSV/influenza/COVID-negative  Blood cultures negative x 2 at 48 hours  CT chest abdomen pelvis no acute intrathoracic  intra-abdominal findings  SIRS resolved  Chronic heart failure with preserved ejection fraction (HCC)  Wt Readings from Last 3 Encounters:   05/22/25 87.1 kg (192 lb)       2D echo reveals EF 64%, grade 2 diastolic dysfunction  Continue low-salt diet  Outpatient follow-up  PAD (peripheral artery disease) (HCC)  Continue aspirin, atorvastatin  DISH (diffuse idiopathic skeletal hyperostosis)  Noted on admission CT abdomen pelvis as part of trauma workup  Outpatient follow-up  Hypocalcemia  Noted on admission with corrected level of 7.5.  Likely in the setting of CKD 5, as well as vitamin D deficiency.  Monitor  Hypokalemia  Potassium 2.7  Replete  Reports taking 40 mEq potassium twice daily at home  Continue potassium 40 mEq p.o. twice daily  Monitor potassium levels    Type 1 diabetes mellitus (HCC)  Lab Results   Component Value Date    HGBA1C 6.9 (H) 05/21/2025       Recent Labs     05/24/25  1622 05/24/25  2159 05/25/25  0619 05/25/25  1135   POCGLU 294* 267* 183* 171*       Blood Sugar Average: Last 72 hrs:  (P) 218.8125    Insulin therapy per endocrinology  Monitor Accu-Cheks  Avoid hypoglycemia  Hypoglycemia protocol in place    Hypomagnesemia  Magnesium 2.1  Monitor             VTE Pharmacologic Prophylaxis:   Moderate Risk (Score 3-4) - Pharmacological DVT Prophylaxis Ordered: apixaban (Eliquis).    Mobility:   Basic Mobility Inpatient Raw Score: 15  JH-HLM Goal: 4: Move to chair/commode  JH-HLM Achieved: 4: Move to chair/commode  JH-HLM Goal NOT achieved. Continue with multidisciplinary rounding and encourage appropriate mobility to improve upon JH-HLM goals.    Patient Centered Rounds: I performed bedside rounds with nursing staff today.   Discussions with Specialists or Other Care Team Provider: Case management    Education and Discussions with Family / Patient: Discussed with the patient, updated daughter in detail questions answered.     Current Length of Stay: 3 day(s)  Current Patient Status: Inpatient    Certification Statement: The patient will continue to require additional inpatient hospital stay due to hypokalemia requiring IV repletion as outlined  Discharge Plan: Hypokalemia requiring IV repletion as outlined    Code Status: Level 1 - Full Code    Subjective     Comfortably sitting up in chair  Reports feeling better today  Encourage adequate p.o. intake  Discussed with RN  Encourage incentive spirometry      Objective :  Temp:  [97.4 °F (36.3 °C)-97.8 °F (36.6 °C)] 97.4 °F (36.3 °C)  HR:  [44-74] 48  BP: (119-148)/(53-83) 121/53  Resp:  [18-20] 19  SpO2:  [82 %-99 %] 90 %  O2 Device: None (Room air)    There is no height or weight on file to calculate BMI.     Input and Output Summary (last 24 hours):     Intake/Output Summary (Last 24 hours) at 5/25/2025 1348  Last data filed at 5/25/2025 1315  Gross per 24 hour   Intake 340 ml   Output 2250 ml   Net -1910 ml       Physical Exam      Vitals:    05/25/25 1258 05/25/25 1300 05/25/25 1302 05/25/25 1305   BP: 146/83 146/83 119/59 121/53   Pulse: 69 (!) 44 72 (!) 48   Patient Position - Orthostatic VS:  Lying - Orthostatic VS Sitting - Orthostatic VS Standing - Orthostatic VS        Comfortable in chair  Obese  Short thick neck  Lungs diminished breath sounds  Heart sounds S1-S2 noted  Abdomen soft  Abdominal obesity noted  Awake follows commands  No rash      Lines/Drains:              Lab Results: I have reviewed the following results:   Results from last 7 days   Lab Units 05/24/25  0522   WBC Thousand/uL 10.11   HEMOGLOBIN g/dL 9.7*   HEMATOCRIT % 30.6*   PLATELETS Thousands/uL 180   SEGS PCT % 72   LYMPHO PCT % 16   MONO PCT % 10   EOS PCT % 1     Results from last 7 days   Lab Units 05/25/25  0508 05/22/25  0537 05/21/25  1521   SODIUM mmol/L 138   < > 138   POTASSIUM mmol/L 2.7*   < > 2.8*   CHLORIDE mmol/L 102   < > 101   CO2 mmol/L 29   < > 28   BUN mg/dL 16   < > 11   CREATININE mg/dL 1.03   < > 1.10   ANION GAP mmol/L 7   < > 9   CALCIUM mg/dL 8.1*    < > 7.0*   ALBUMIN g/dL  --   --  3.4*   TOTAL BILIRUBIN mg/dL  --   --  0.91   ALK PHOS U/L  --   --  136*   ALT U/L  --   --  13   AST U/L  --   --  34   GLUCOSE RANDOM mg/dL 168*   < > 40*    < > = values in this interval not displayed.     Results from last 7 days   Lab Units 05/21/25  1521   INR  1.63*     Results from last 7 days   Lab Units 05/25/25  1135 05/25/25  0619 05/24/25  2159 05/24/25  1622 05/24/25  1122 05/24/25  0755 05/23/25  2113 05/23/25  1701 05/23/25  1130 05/23/25  0737 05/22/25  2159 05/22/25  1650   POC GLUCOSE mg/dl 171* 183* 267* 294* 192* 195* 350* 376* 341* 226* 281* 201*     Results from last 7 days   Lab Units 05/21/25  1521   HEMOGLOBIN A1C % 6.9*     Results from last 7 days   Lab Units 05/21/25  1938   LACTIC ACID mmol/L 0.8   PROCALCITONIN ng/ml 0.43*       Recent Cultures (last 7 days):   Results from last 7 days   Lab Units 05/22/25  1722   BLOOD CULTURE  No Growth at 48 hrs.  No Growth at 48 hrs.       Imaging Results Review: I personally reviewed the following image studies/reports in PACS and discussed pertinent findings with Radiology: chest xray, CT chest, CT abdomen/pelvis, CT head, and CT C-spine. My interpretation of the radiology images/reports is: Lab results reviewed.      Last 24 Hours Medication List:     Current Facility-Administered Medications:     acetaminophen (TYLENOL) tablet 650 mg, Q6H PRN    aluminum-magnesium hydroxide-simethicone (MAALOX) oral suspension 30 mL, Q6H PRN    apixaban (ELIQUIS) tablet 5 mg, BID    aspirin chewable tablet 81 mg, Daily    atorvastatin (LIPITOR) tablet 10 mg, Daily With Dinner    Cholecalciferol (VITAMIN D3) tablet 1,000 Units, Daily    fludrocortisone (FLORINEF) tablet 0.2 mg, Daily    [START ON 5/26/2025] hydrocortisone (CORTEF) tablet 20 mg, QAM **AND** hydrocortisone (CORTEF) tablet 15 mg, Daily With Lunch    insulin glargine (LANTUS) subcutaneous injection 10 Units 0.1 mL, HS    insulin lispro (HumALOG/ADMELOG) 100  units/mL subcutaneous injection 1-5 Units, HS    insulin lispro (HumALOG/ADMELOG) 100 units/mL subcutaneous injection 1-6 Units, TID AC **AND** Fingerstick Glucose (POCT), TID AC    insulin lispro (HumALOG/ADMELOG) 100 units/mL subcutaneous injection 3 Units, TID With Meals    magnesium sulfate 2 g/50 mL IVPB (premix) 2 g, Once    midodrine (PROAMATINE) tablet 5 mg, TID AC    ondansetron (ZOFRAN) injection 4 mg, Q6H PRN    potassium chloride (Klor-Con M20) CR tablet 40 mEq, BID    potassium chloride 20 mEq IVPB (premix), Q2H    senna (SENOKOT) tablet 8.6 mg, Daily    sertraline (ZOLOFT) tablet 100 mg, Daily    Administrative Statements   Today, Patient Was Seen By: Miguelito Martinez MD  I have spent a total time of 34 minutes in caring for this patient on the day of the visit/encounter including Diagnostic results, Risks and benefits of tx options, Instructions for management, Patient and family education, Importance of tx compliance, Risk factor reductions, Impressions, Counseling / Coordination of care, Documenting in the medical record, Reviewing/placing orders in the medical record (including tests, medications, and/or procedures), Obtaining or reviewing history  , and Communicating with other healthcare professionals .    **Please Note: This note may have been constructed using a voice recognition system.**

## 2025-05-25 NOTE — ASSESSMENT & PLAN NOTE
Lab Results   Component Value Date    HGBA1C 6.9 (H) 05/21/2025       Recent Labs     05/24/25  1622 05/24/25  2159 05/25/25  0619 05/25/25  1135   POCGLU 294* 267* 183* 171*     Blood Sugar Average: Last 72 hrs:  (P) 218.8125  Type 1 diabetes mellitus with hyper and hypoglycemia with brittle glycemic control, previously admitted with hypoglycemia and known to endocrinology service  At home he takes Toujeo 12 units nightly, Humalog 4 units 3 times daily before meal, correctional scale insulin 1 unit for every 50 mg over 150    Plan:  Continue current regimen with Lantus 10 units nightly and Humalog 3 with meals, correctional insulin  Monitor for hypoglycemia treat according to protocol  Continue Accu-Cheks before meals and at bedtime  Encourage p.o. intake.  Goal blood sugar while in the [id number] mg/dL

## 2025-05-25 NOTE — PROGRESS NOTES
Progress Note - Endocrinology   Name: Karson Kelley y.o. male I MRN: [id number]  Unit/Bed#: [facility] 815-01 I Date of Admission: 5/21/2025   Date of Service: 5/25/2025 I Hospital Day: 3     Assessment & Plan  Syncope and collapse  In the setting of hypoglycemia as evidenced by blood sugar of 40 mg/dL on BMP on admission.   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  Management per primary team, rest as noted below  Type 1 diabetes mellitus (HCC)  Lab Results   Component Value Date    HGBA1C 6.9 (H) 05/21/2025       Recent Labs     05/24/25  1622 05/24/25  2159 05/25/25  0619 05/25/25  1135   POCGLU 294* 267* 183* 171*     Blood Sugar Average: Last 72 hrs:  (P) 218.8125  Type 1 diabetes mellitus with hyper and hypoglycemia with brittle glycemic control, previously admitted with hypoglycemia and known to endocrinology service  At home he takes Toujeo 12 units nightly, Humalog 4 units 3 times daily before meal, correctional scale insulin 1 unit for every 50 mg over 150    Plan:  Continue current regimen with Lantus 10 units nightly and Humalog 3 with meals, correctional insulin  Monitor for hypoglycemia treat according to protocol  Continue Accu-Cheks before meals and at bedtime  Encourage p.o. intake.  Goal blood sugar while in the [id number] mg/dL    Adrenal insufficiency (HCC)  At home takes hydrocortisone 15 mg in the morning, 10 mg in the afternoon  Wean hydrocortisone down to 20 mg in a.m. and 15 mg in p.m. today and if continues to remain hemodynamically stable, back to home dose of 15 mg in a.m. and 10 mg in p.m. tomorrow  Hypoglycemia  Plan as above  Now resolved  Orthostatic hypotension  Continue fludrocortisone 0.2 mg daily    Progress Note - Karson Kelley y.o. male MRN: [id number]    Unit/Bed#: [facility] 815-01 Encounter: [id number]      Subjective:  Patient seen at bedside, no acute distress and reports good appetite, denies nausea,  vomiting, lightheadedness.  Hemodynamically stable.    Objective:     Vitals: Blood pressure (!) 160/138, pulse 66, temperature 98.1 °F (36.7 °C), resp. rate 19, weight 87.1 kg (192 lb), SpO2 99%.,There is no height or weight on file to calculate BMI.    Physical Exam:  General Appearance: awake, appears stated age and cooperative  Head: Normocephalic, without obvious abnormality  Extremities: Sitting propped up in bed  Skin: Skin color and temperature normal.   Pulm: no labored breathing  Neuro: Alert awake oriented    Lab, Imaging and other studies:   Pertinent labs have been reviewed.    Portions of the record may have been created with voice recognition software.

## 2025-05-25 NOTE — ASSESSMENT & PLAN NOTE
Lab Results   Component Value Date    EGFR 74 05/25/2025    EGFR 73 05/24/2025    EGFR 88 05/24/2025    CREATININE 1.03 05/25/2025    CREATININE 1.04 05/24/2025    CREATININE 0.87 05/24/2025   Baseline creatinine 0.9-1.1  Monitor kidney function  Outpatient follow-up

## 2025-05-25 NOTE — ASSESSMENT & PLAN NOTE
Patient presents syncope and collapse noted to have hypoglycemia with glucose of 40  Likely multifactorial  Patient with history of internal insufficiency and orthostatic hypotension  2D echo EF 64% grade 2 diastolic dysfunction, aortic valve sclerosis reported  Received IV fluids  Symptomatically improved  Supportive cares  Safe ambulation discussed and encouraged

## 2025-05-25 NOTE — PROGRESS NOTES
Progress Note - Hospitalist   Name: Karson You 70 y.o. male I MRN: [id number]  Unit/Bed#: [facility] 815-01 I Date of Admission: 5/21/2025   Date of Service: 5/25/2025 I Hospital Day: 3     Assessment & Plan  Syncope and collapse  Patient presents syncope and collapse noted to have hypoglycemia with glucose of 40  Likely multifactorial  Patient with history of internal insufficiency and orthostatic hypotension  2D echo EF 64% grade 2 diastolic dysfunction, aortic valve sclerosis reported  Received IV fluids  Symptomatically improved  Supportive cares  Safe ambulation discussed and encouraged  Paroxysmal A-fib (HCC)  Continue Eliquis for anticoagulation  Adrenal insufficiency ([facility])  History of renal insufficiency  Continue hydrocortisone 20 mg a.m., 15 mg p.m.  Endocrinology inputs noted  Outpatient follow-up  Hypoglycemia  Present on admission glucose of 40  Hypoglycemia resolved  Encourage adequate p.o. intake  Accu-Chek monitoring and hypoglycemia remedial measures encouraged  Orthostatic hypotension  History of orthostatic hypotension  Continue midodrine  Continue fludrocortisone  Encourage abdominal binder, compression stockings  Safe ambulation encouraged  Avoid hypotension    History of cerebrovascular accident (CVA) in adulthood  Continue aspirin, atorvastatin  Iron deficiency anemia  Monitor hemoglobin    Recent Labs     05/23/25  0549 05/24/25  0522   HGB 9.5* 9.7*       CKD (chronic kidney disease) stage 5, GFR less than 15 ml/min ([facility])  Lab Results   Component Value Date    EGFR 74 05/25/2025    EGFR 73 05/24/2025    EGFR 88 05/24/2025    CREATININE 1.03 05/25/2025    CREATININE 1.04 05/24/2025    CREATININE 0.87 05/24/2025   Baseline creatinine 0.9-1.1  Monitor kidney function  Outpatient follow-up  SIRS (systemic inflammatory response syndrome) ([facility])  SIRS present on admission  RSV/influenza/COVID-negative  Blood cultures negative x 2 at 48 hours  CT chest abdomen pelvis no acute intrathoracic intra-abdominal  findings  SIRS resolved  Chronic heart failure with preserved ejection fraction (HCC)  Wt Readings from Last 3 Encounters:   05/22/25 87.1 kg (192 lb)       2D echo reveals EF 64%, grade 2 diastolic dysfunction  Continue low-salt diet  Outpatient follow-up  PAD (peripheral artery disease) (HCC)  Continue aspirin, atorvastatin  DISH (diffuse idiopathic skeletal hyperostosis)  Noted on admission CT abdomen pelvis as part of trauma workup  Outpatient follow-up  Hypocalcemia  Noted on admission with corrected level of 7.5.  Likely in the setting of CKD 5, as well as vitamin D deficiency.  Monitor  Hypokalemia  Potassium 2.7  Replete  Reports taking 40 mEq potassium twice daily at home  Continue potassium 40 mEq p.o. twice daily  Monitor potassium levels    Type 1 diabetes mellitus (HCC)  Lab Results   Component Value Date    HGBA1C 6.9 (H) 05/21/2025       Recent Labs     05/24/25  1622 05/24/25  2159 05/25/25  0619 05/25/25  1135   POCGLU 294* 267* 183* 171*       Blood Sugar Average: Last 72 hrs:  (P) 218.8125    Insulin therapy per endocrinology  Monitor Accu-Cheks  Avoid hypoglycemia  Hypoglycemia protocol in place    Hypomagnesemia  Magnesium 2.1  Monitor      VTE Pharmacologic Prophylaxis:   Moderate Risk (Score 3-4) - Pharmacological DVT Prophylaxis Ordered: apixaban (Eliquis).    Mobility:   Basic Mobility Inpatient Raw Score: 15  JH-HLM Goal: 4: Move to chair/commode  JH-HLM Achieved: 4: Move to chair/commode  JH-HLM Goal NOT achieved. Continue with multidisciplinary rounding and encourage appropriate mobility to improve upon JH-HLM goals.    Patient Centered Rounds: I performed bedside rounds with nursing staff today.   Discussions with Specialists or Other Care Team Provider: Case management    Education and Discussions with Family / Patient: Discussed with the patient, updated daughter Ofelia in detail questions answered.     Current Length of Stay: 3 day(s)  Current Patient Status: Inpatient    Certification Statement: The patient will continue to require additional inpatient hospital stay due to hypokalemia requiring IV repletion and close monitoring  Discharge Plan: Hypokalemia requiring IV potassium and close monitoring    Code Status: Level 1 - Full Code    Subjective     Comfortably in chair  Reports feeling okay  Encourage incentive spirometry  Discussed with RN    Objective :  Temp:  [97.4 °F (36.3 °C)-97.8 °F (36.6 °C)] 97.4 °F (36.3 °C)  HR:  [44-74] 48  BP: (119-148)/(53-83) 121/53  Resp:  [18-20] 19  SpO2:  [82 %-99 %] 90 %  O2 Device: None (Room air)    There is no height or weight on file to calculate BMI.     Input and Output Summary (last 24 hours):     Intake/Output Summary (Last 24 hours) at 5/25/2025 1353  Last data filed at 5/25/2025 1315  Gross per 24 hour   Intake 340 ml   Output 2250 ml   Net -1910 ml       Physical Exam    Vitals:    05/25/25 1258 05/25/25 1300 05/25/25 1302 05/25/25 1305   BP: 146/83 146/83 119/59 121/53   Pulse: 69 (!) 44 72 (!) 48   Patient Position - Orthostatic VS:  Lying - Orthostatic VS Sitting - Orthostatic VS Standing - Orthostatic VS      Comfortably in chair  Obese  Short thick neck  Lungs diminished breath sounds  Heart sounds S1-S2 noted  Abdomen soft  Awake follows commands  No rash      Lines/Drains:        Telemetry:  Telemetry Orders (From admission, onward)               24 Hour Telemetry Monitoring  Continuous x 24 Hours (Telem)        Expiring   Question:  Reason for 24 Hour Telemetry  Answer:  Metabolic/electrolyte disturbance with high probability of dysrhythmia. K level <3 or >6 OR KCL infusion >10mEq/hr                     Telemetry Reviewed: Sinus rhythm  Indication for Continued Telemetry Use: Metabolic/electrolyte disturbance with high probability of dysrhythmia               Lab Results: I have reviewed the following results:   Results from last 7 days   Lab Units 05/24/25  0522   WBC Thousand/uL 10.11   HEMOGLOBIN g/dL 9.7*   HEMATOCRIT  % 30.6*   PLATELETS Thousands/uL 180   SEGS PCT % 72   LYMPHO PCT % 16   MONO PCT % 10   EOS PCT % 1     Results from last 7 days   Lab Units 05/25/25  0508 05/22/25  0537 05/21/25  1521   SODIUM mmol/L 138   < > 138   POTASSIUM mmol/L 2.7*   < > 2.8*   CHLORIDE mmol/L 102   < > 101   CO2 mmol/L 29   < > 28   BUN mg/dL 16   < > 11   CREATININE mg/dL 1.03   < > 1.10   ANION GAP mmol/L 7   < > 9   CALCIUM mg/dL 8.1*   < > 7.0*   ALBUMIN g/dL  --   --  3.4*   TOTAL BILIRUBIN mg/dL  --   --  0.91   ALK PHOS U/L  --   --  136*   ALT U/L  --   --  13   AST U/L  --   --  34   GLUCOSE RANDOM mg/dL 168*   < > 40*    < > = values in this interval not displayed.     Results from last 7 days   Lab Units 05/21/25  1521   INR  1.63*     Results from last 7 days   Lab Units 05/25/25  1135 05/25/25  0619 05/24/25  2159 05/24/25  1622 05/24/25  1122 05/24/25  0755 05/23/25  2113 05/23/25  1701 05/23/25  1130 05/23/25  0737 05/22/25  2159 05/22/25  1650   POC GLUCOSE mg/dl 171* 183* 267* 294* 192* 195* 350* 376* 341* 226* 281* 201*     Results from last 7 days   Lab Units 05/21/25  1521   HEMOGLOBIN A1C % 6.9*     Results from last 7 days   Lab Units 05/21/25  1938   LACTIC ACID mmol/L 0.8   PROCALCITONIN ng/ml 0.43*       Recent Cultures (last 7 days):   Results from last 7 days   Lab Units 05/22/25  1722   BLOOD CULTURE  No Growth at 48 hrs.  No Growth at 48 hrs.       Imaging Results Review: I personally reviewed the following image studies/reports in PACS and discussed pertinent findings with Radiology: chest xray, CT chest, CT abdomen/pelvis, CT head, xray(s), and Echocardiogram. My interpretation of the radiology images/reports is: Lab results reviewed.      Last 24 Hours Medication List:     Current Facility-Administered Medications:     acetaminophen (TYLENOL) tablet 650 mg, Q6H PRN    aluminum-magnesium hydroxide-simethicone (MAALOX) oral suspension 30 mL, Q6H PRN    apixaban (ELIQUIS) tablet 5 mg, BID    aspirin chewable  tablet 81 mg, Daily    atorvastatin (LIPITOR) tablet 10 mg, Daily With Dinner    Cholecalciferol (VITAMIN D3) tablet 1,000 Units, Daily    fludrocortisone (FLORINEF) tablet 0.2 mg, Daily    [START ON 5/26/2025] hydrocortisone (CORTEF) tablet 20 mg, QAM **AND** hydrocortisone (CORTEF) tablet 15 mg, Daily With Lunch    insulin glargine (LANTUS) subcutaneous injection 10 Units 0.1 mL, HS    insulin lispro (HumALOG/ADMELOG) 100 units/mL subcutaneous injection 1-5 Units, HS    insulin lispro (HumALOG/ADMELOG) 100 units/mL subcutaneous injection 1-6 Units, TID AC **AND** Fingerstick Glucose (POCT), TID AC    insulin lispro (HumALOG/ADMELOG) 100 units/mL subcutaneous injection 3 Units, TID With Meals    magnesium sulfate 2 g/50 mL IVPB (premix) 2 g, Once    midodrine (PROAMATINE) tablet 5 mg, TID AC    ondansetron (ZOFRAN) injection 4 mg, Q6H PRN    potassium chloride (Klor-Con M20) CR tablet 40 mEq, BID    potassium chloride 20 mEq IVPB (premix), Q2H    senna (SENOKOT) tablet 8.6 mg, Daily    sertraline (ZOLOFT) tablet 100 mg, Daily    Administrative Statements   Today, Patient Was Seen By: Miguelito Martinez MD  I have spent a total time of 34 minutes in caring for this patient on the day of the visit/encounter including Diagnostic results, Risks and benefits of tx options, Instructions for management, Patient and family education, Importance of tx compliance, Risk factor reductions, Impressions, Counseling / Coordination of care, Documenting in the medical record, Reviewing/placing orders in the medical record (including tests, medications, and/or procedures), Obtaining or reviewing history  , and Communicating with other healthcare professionals .    **Please Note: This note may have been constructed using a voice recognition system.**

## 2025-05-25 NOTE — ASSESSMENT & PLAN NOTE
Wt Readings from Last 3 Encounters:   05/22/25 87.1 kg (192 lb)       2D echo reveals EF 64%, grade 2 diastolic dysfunction  Continue low-salt diet  Outpatient follow-up

## 2025-05-25 NOTE — ASSESSMENT & PLAN NOTE
SIRS present on admission  RSV/influenza/COVID-negative  Blood cultures negative x 2 at 48 hours  CT chest abdomen pelvis no acute intrathoracic intra-abdominal findings  SIRS resolved

## 2025-05-25 NOTE — ASSESSMENT & PLAN NOTE
History of orthostatic hypotension  Continue midodrine  Continue fludrocortisone  Encourage abdominal binder, compression stockings  Safe ambulation encouraged  Avoid hypotension

## 2025-05-25 NOTE — ASSESSMENT & PLAN NOTE
Present on admission glucose of 40  Hypoglycemia resolved  Encourage adequate p.o. intake  Accu-Chek monitoring and hypoglycemia remedial measures encouraged

## 2025-05-25 NOTE — ASSESSMENT & PLAN NOTE
In the setting of hypoglycemia as evidenced by blood sugar of 40 mg/dL on BMP on admission.   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  Management per primary team, rest as noted below

## 2025-05-25 NOTE — ASSESSMENT & PLAN NOTE
History of renal insufficiency  Continue hydrocortisone 20 mg a.m., 15 mg p.m.  Endocrinology inputs noted  Outpatient follow-up

## 2025-05-25 NOTE — ASSESSMENT & PLAN NOTE
Potassium 2.7  Replete  Reports taking 40 mEq potassium twice daily at home  Continue potassium 40 mEq p.o. twice daily  Monitor potassium levels

## 2025-05-25 NOTE — ASSESSMENT & PLAN NOTE
At home takes hydrocortisone 15 mg in the morning, 10 mg in the afternoon  Wean hydrocortisone down to 20 mg in a.m. and 15 mg in p.m. today and if continues to remain hemodynamically stable, back to home dose of 15 mg in a.m. and 10 mg in p.m. tomorrow

## 2025-05-26 LAB
ANION GAP SERPL CALCULATED.3IONS-SCNC: 6 MMOL/L (ref 4–13)
BUN SERPL-MCNC: 17 MG/DL (ref 5–25)
CALCIUM SERPL-MCNC: 7.8 MG/DL (ref 8.4–10.2)
CHLORIDE SERPL-SCNC: 106 MMOL/L (ref 96–108)
CO2 SERPL-SCNC: 30 MMOL/L (ref 21–32)
CREAT SERPL-MCNC: 1.02 MG/DL (ref 0.6–1.3)
GFR SERPL CREATININE-BSD FRML MDRD: 75 ML/MIN/1.73SQ M
GLUCOSE SERPL-MCNC: 107 MG/DL (ref 65–140)
GLUCOSE SERPL-MCNC: 108 MG/DL (ref 65–140)
GLUCOSE SERPL-MCNC: 150 MG/DL (ref 65–140)
GLUCOSE SERPL-MCNC: 84 MG/DL (ref 65–140)
GLUCOSE SERPL-MCNC: 97 MG/DL (ref 65–140)
GLUCOSE SERPL-MCNC: 99 MG/DL (ref 65–140)
MAGNESIUM SERPL-MCNC: 1.8 MG/DL (ref 1.9–2.7)
POTASSIUM SERPL-SCNC: 2.9 MMOL/L (ref 3.5–5.3)
SODIUM SERPL-SCNC: 142 MMOL/L (ref 135–147)

## 2025-05-26 PROCEDURE — 99232 SBSQ HOSP IP/OBS MODERATE 35: CPT | Performed by: INTERNAL MEDICINE

## 2025-05-26 PROCEDURE — 82948 REAGENT STRIP/BLOOD GLUCOSE: CPT

## 2025-05-26 PROCEDURE — 83735 ASSAY OF MAGNESIUM: CPT | Performed by: INTERNAL MEDICINE

## 2025-05-26 PROCEDURE — 80048 BASIC METABOLIC PNL TOTAL CA: CPT | Performed by: STUDENT IN AN ORGANIZED HEALTH CARE EDUCATION/TRAINING PROGRAM

## 2025-05-26 RX ORDER — HYDROCORTISONE 10 MG/1
10 TABLET ORAL
Status: DISCONTINUED | OUTPATIENT
Start: 2025-05-26 | End: 2025-05-28 | Stop reason: HOSPADM

## 2025-05-26 RX ORDER — INSULIN GLARGINE 100 [IU]/ML
8 INJECTION, SOLUTION SUBCUTANEOUS
Status: DISCONTINUED | OUTPATIENT
Start: 2025-05-26 | End: 2025-05-27

## 2025-05-26 RX ORDER — INSULIN LISPRO 100 [IU]/ML
1-5 INJECTION, SOLUTION INTRAVENOUS; SUBCUTANEOUS
Status: DISCONTINUED | OUTPATIENT
Start: 2025-05-26 | End: 2025-05-28

## 2025-05-26 RX ORDER — POTASSIUM CHLORIDE 14.9 MG/ML
20 INJECTION INTRAVENOUS
Status: COMPLETED | OUTPATIENT
Start: 2025-05-26 | End: 2025-05-26

## 2025-05-26 RX ORDER — POTASSIUM CHLORIDE 1500 MG/1
40 TABLET, EXTENDED RELEASE ORAL
Status: DISCONTINUED | OUTPATIENT
Start: 2025-05-26 | End: 2025-05-28 | Stop reason: HOSPADM

## 2025-05-26 RX ORDER — MAGNESIUM SULFATE HEPTAHYDRATE 40 MG/ML
4 INJECTION, SOLUTION INTRAVENOUS ONCE
Status: COMPLETED | OUTPATIENT
Start: 2025-05-26 | End: 2025-05-26

## 2025-05-26 RX ORDER — INSULIN LISPRO 100 [IU]/ML
2 INJECTION, SOLUTION INTRAVENOUS; SUBCUTANEOUS
Status: DISCONTINUED | OUTPATIENT
Start: 2025-05-27 | End: 2025-05-28 | Stop reason: HOSPADM

## 2025-05-26 RX ADMIN — ASPIRIN 81 MG CHEWABLE TABLET 81 MG: 81 TABLET CHEWABLE at 08:06

## 2025-05-26 RX ADMIN — SENNOSIDES 8.6 MG: 8.6 TABLET, FILM COATED ORAL at 08:06

## 2025-05-26 RX ADMIN — MAGNESIUM SULFATE HEPTAHYDRATE 4 G: 40 INJECTION, SOLUTION INTRAVENOUS at 12:50

## 2025-05-26 RX ADMIN — POTASSIUM CHLORIDE 40 MEQ: 1500 TABLET, EXTENDED RELEASE ORAL at 17:04

## 2025-05-26 RX ADMIN — ATORVASTATIN CALCIUM 10 MG: 10 TABLET, FILM COATED ORAL at 17:04

## 2025-05-26 RX ADMIN — APIXABAN 5 MG: 5 TABLET, FILM COATED ORAL at 08:06

## 2025-05-26 RX ADMIN — HYDROCORTISONE 10 MG: 10 TABLET ORAL at 12:00

## 2025-05-26 RX ADMIN — POTASSIUM CHLORIDE 40 MEQ: 1500 TABLET, EXTENDED RELEASE ORAL at 08:06

## 2025-05-26 RX ADMIN — Medication 1000 UNITS: at 08:06

## 2025-05-26 RX ADMIN — FLUDROCORTISONE ACETATE 0.2 MG: 0.1 TABLET ORAL at 08:08

## 2025-05-26 RX ADMIN — SERTRALINE HYDROCHLORIDE 100 MG: 100 TABLET ORAL at 08:06

## 2025-05-26 RX ADMIN — POTASSIUM CHLORIDE 20 MEQ: 14.9 INJECTION, SOLUTION INTRAVENOUS at 12:58

## 2025-05-26 RX ADMIN — APIXABAN 5 MG: 5 TABLET, FILM COATED ORAL at 17:04

## 2025-05-26 RX ADMIN — INSULIN LISPRO 3 UNITS: 100 INJECTION, SOLUTION INTRAVENOUS; SUBCUTANEOUS at 08:07

## 2025-05-26 RX ADMIN — INSULIN GLARGINE 8 UNITS: 100 INJECTION, SOLUTION SUBCUTANEOUS at 22:39

## 2025-05-26 RX ADMIN — HYDROCORTISONE 20 MG: 20 TABLET ORAL at 08:08

## 2025-05-26 RX ADMIN — POTASSIUM CHLORIDE 20 MEQ: 14.9 INJECTION, SOLUTION INTRAVENOUS at 15:23

## 2025-05-26 RX ADMIN — INSULIN LISPRO 3 UNITS: 100 INJECTION, SOLUTION INTRAVENOUS; SUBCUTANEOUS at 12:00

## 2025-05-26 NOTE — ASSESSMENT & PLAN NOTE
Lab Results   Component Value Date    HGBA1C 6.9 (H) 05/21/2025       Recent Labs     05/25/25  2100 05/26/25  0226 05/26/25  0720 05/26/25  1152   POCGLU 228* 150* 84 107       Blood Sugar Average: Last 72 hrs:  (P) 219.2    Insulin therapy per endocrinology  Monitor Accu-Cheks  Avoid hypoglycemia  Hypoglycemia protocol in place

## 2025-05-26 NOTE — ASSESSMENT & PLAN NOTE
Patient presents syncope and collapse noted to have hypoglycemia with glucose of 40  Likely multifactorial  Patient with history of internal insufficiency and orthostatic hypotension  2D echo EF 64% grade 2 diastolic dysfunction, aortic valve sclerosis reported  Received IV fluids  Symptomatically improved  Safe ambulation discussed and encouraged  Supportive cares

## 2025-05-26 NOTE — ASSESSMENT & PLAN NOTE
Potassium 2.9  Replete  Reports taking 40 mEq potassium twice daily at home  Continue potassium 40 mill equivalents increased to 3 times daily dosing  Monitor potassium levels

## 2025-05-26 NOTE — ASSESSMENT & PLAN NOTE
History of renal insufficiency  Continue hydrocortisone 15 mg a.m., 10 mg p.m.  Endocrinology input noted  Outpatient follow-up

## 2025-05-26 NOTE — ASSESSMENT & PLAN NOTE
Lab Results   Component Value Date    HGBA1C 6.9 (H) 05/21/2025       Recent Labs     05/25/25  1613 05/25/25  2100 05/26/25  0226 05/26/25  0720   POCGLU 124 228* 150* 84     Blood Sugar Average: Last 72 hrs:  (P) 227.4344081982791813  Type 1 diabetes mellitus with hyper and hypoglycemia with brittle glycemic control, previously admitted with hypoglycemia and known to endocrinology service  At home he takes Toujeo 12 units nightly, Humalog 4 units 3 times daily before meal, correctional scale insulin 1 unit for every 50 mg over 150    Plan:  Dec current regimen to Lantus 8 units nightly and Humalog 2 with meals, correctional insulin  Monitor for hypoglycemia treat according to protocol  Continue Accu-Cheks before meals and at bedtime  Encourage p.o. intake.  Goal blood sugar while in the [id number] mg/dL  When planned for discharge, can go on current inpatient regimen with holding parameters on mealtime insulin if he is not eating.  Will not advise PTA insulin regimen given tight glycemic control.

## 2025-05-26 NOTE — ASSESSMENT & PLAN NOTE
History of orthostatic hypotension  Continue midodrine 5 mg 3 times daily  Continue fludrocortisone 0.2 mg p.o. daily  Encourage abdominal binder, compression stockings  Safe ambulation encouraged  Avoid hypotension

## 2025-05-26 NOTE — ASSESSMENT & PLAN NOTE
Lab Results   Component Value Date    EGFR 75 05/26/2025    EGFR 74 05/25/2025    EGFR 73 05/24/2025    CREATININE 1.02 05/26/2025    CREATININE 1.03 05/25/2025    CREATININE 1.04 05/24/2025   Baseline creatinine 0.9-1.1  Monitor kidney function

## 2025-05-26 NOTE — PLAN OF CARE
Problem: PAIN - ADULT  Goal: Verbalizes/displays adequate comfort level or baseline comfort level  Description: Interventions:  - Encourage patient to monitor pain and request assistance  - Assess pain using appropriate pain scale  - Administer analgesics as ordered based on type and severity of pain and evaluate response  - Implement non-pharmacological measures as appropriate and evaluate response  - Consider cultural and social influences on pain and pain management  - Notify physician/advanced practitioner if interventions unsuccessful or patient reports new pain  - Educate patient/family on pain management process including their role and importance of  reporting pain   - Provide non-pharmacologic/complimentary pain relief interventions  Outcome: Progressing     Problem: INFECTION - ADULT  Goal: Absence or prevention of progression during hospitalization  Description: INTERVENTIONS:  - Assess and monitor for signs and symptoms of infection  - Monitor lab/diagnostic results  - Monitor all insertion sites, i.e. indwelling lines, tubes, and drains  - Monitor endotracheal if appropriate and nasal secretions for changes in amount and color  - Rice appropriate cooling/warming therapies per order  - Administer medications as ordered  - Instruct and encourage patient and family to use good hand hygiene technique  - Identify and instruct in appropriate isolation precautions for identified infection/condition  Outcome: Progressing     Problem: SAFETY ADULT  Goal: Patient will remain free of falls  Description: INTERVENTIONS:  - Educate patient/family on patient safety including physical limitations  - Instruct patient to call for assistance with activity   - Consider consulting OT/PT to assist with strengthening/mobility based on AM PAC & JH-HLM score  - Consult OT/PT to assist with strengthening/mobility   - Keep Call bell within reach  - Keep bed low and locked with side rails adjusted as appropriate  - Keep  care items and personal belongings within reach  - Initiate and maintain comfort rounds  - Make Fall Risk Sign visible to staff  - Initiate/Maintain alarm  - Obtain necessary fall risk management equipment:   - Apply yellow socks and bracelet for high fall risk patients  - Consider moving patient to room near nurses station  Outcome: Progressing

## 2025-05-26 NOTE — PROGRESS NOTES
Progress Note - Hospitalist   Name: Karson You 70 y.o. male I MRN: [id number]  Unit/Bed#: [facility] 815-01 I Date of Admission: 5/21/2025   Date of Service: 5/26/2025 I Hospital Day: 4     Assessment & Plan  Syncope and collapse  Patient presents syncope and collapse noted to have hypoglycemia with glucose of 40  Likely multifactorial  Patient with history of internal insufficiency and orthostatic hypotension  2D echo EF 64% grade 2 diastolic dysfunction, aortic valve sclerosis reported  Received IV fluids  Symptomatically improved  Safe ambulation discussed and encouraged  Supportive cares  Paroxysmal A-fib ([facility])  Continue Eliquis for anticoagulation  Adrenal insufficiency ([facility])  History of renal insufficiency  Continue hydrocortisone 15 mg a.m., 10 mg p.m.  Endocrinology input noted  Outpatient follow-up  Hypoglycemia  Present on admission glucose of 40  Hypoglycemia resolved  Encourage adequate p.o. intake  Accu-Chek monitoring and hypoglycemia remedial measures encouraged  Orthostatic hypotension  History of orthostatic hypotension  Continue midodrine 5 mg 3 times daily  Continue fludrocortisone 0.2 mg p.o. daily  Encourage abdominal binder, compression stockings  Safe ambulation encouraged  Avoid hypotension    History of cerebrovascular accident (CVA) in adulthood  Continue aspirin, atorvastatin  Iron deficiency anemia  Monitor hemoglobin    Recent Labs     05/24/25  0522   HGB 9.7*       CKD (chronic kidney disease) stage 5, GFR less than 15 ml/min ([facility])  Lab Results   Component Value Date    EGFR 75 05/26/2025    EGFR 74 05/25/2025    EGFR 73 05/24/2025    CREATININE 1.02 05/26/2025    CREATININE 1.03 05/25/2025    CREATININE 1.04 05/24/2025   Baseline creatinine 0.9-1.1  Monitor kidney function  SIRS (systemic inflammatory response syndrome) ([facility])  SIRS present on admission  RSV/influenza/COVID-negative  Blood cultures negative x 2 at 48 hours  CT chest abdomen pelvis no acute intrathoracic intra-abdominal  findings  SIRS resolved  Chronic heart failure with preserved ejection fraction (HCC)  Wt Readings from Last 3 Encounters:   05/22/25 87.1 kg (192 lb)       2D echo reveals EF 64%, grade 2 diastolic dysfunction  Continue low-salt diet  Outpatient follow-up  PAD (peripheral artery disease) (HCC)  Continue aspirin, atorvastatin  DISH (diffuse idiopathic skeletal hyperostosis)  Noted on admission CT abdomen pelvis as part of trauma workup  Outpatient follow-up  Hypocalcemia  Noted on admission with corrected level of 7.5.  Likely in the setting of CKD 5, as well as vitamin D deficiency.  Monitor  Hypokalemia  Potassium 2.9  Replete  Reports taking 40 mEq potassium twice daily at home  Continue potassium 40 mill equivalents increased to 3 times daily dosing  Monitor potassium levels    Type 1 diabetes mellitus (HCC)  Lab Results   Component Value Date    HGBA1C 6.9 (H) 05/21/2025       Recent Labs     05/25/25  2100 05/26/25  0226 05/26/25  0720 05/26/25  1152   POCGLU 228* 150* 84 107       Blood Sugar Average: Last 72 hrs:  (P) 219.2    Insulin therapy per endocrinology  Monitor Accu-Cheks  Avoid hypoglycemia  Hypoglycemia protocol in place    Hypomagnesemia  Magnesium 1.8  Replete  Monitor        VTE Pharmacologic Prophylaxis:   Moderate Risk (Score 3-4) - Pharmacological DVT Prophylaxis Ordered: apixaban (Eliquis).    Mobility:   Basic Mobility Inpatient Raw Score: 18  JH-HLM Goal: 6: Walk 10 steps or more  JH-HLM Achieved: 4: Move to chair/commode  JH-HLM Goal achieved. Continue to encourage appropriate mobility.    Patient Centered Rounds: I performed bedside rounds with nursing staff today.   Discussions with Specialists or Other Care Team Provider: Case management    Education and Discussions with Family / Patient: Discussed with the patient, updated daughter Ofelia questions answered.     Current Length of Stay: 4 day(s)  Current Patient Status: Inpatient   Certification Statement: Presents with syncope  collapse, hypokalemia requiring IV repletion close monitoring  Discharge Plan: Hypokalemia requiring IV repletion close monitoring possible discharge 24 to 48 hours    Code Status: Level 1 - Full Code    Subjective     Comfortably sitting up in chair  Reports feeling okay  Appetite fair  Encourage incentive spirometry  Discussed with RN      Objective :  Temp:  [97.6 °F (36.4 °C)-98.1 °F (36.7 °C)] 97.6 °F (36.4 °C)  HR:  [44-72] 66  BP: (119-160)/() 152/86  Resp:  [14-19] 14  SpO2:  [82 %-99 %] 98 %    There is no height or weight on file to calculate BMI.     Input and Output Summary (last 24 hours):     Intake/Output Summary (Last 24 hours) at 5/26/2025 1244  Last data filed at 5/26/2025 1000  Gross per 24 hour   Intake 340 ml   Output 2350 ml   Net -2010 ml       Physical Exam    Comfortable sitting up in chair  Neck supple  Lungs diminished breath sounds  Heart sounds S1-S2 noted  Abdomen soft  Awake follows commands  No pedal edema  No rash      Lines/Drains:        Telemetry:  Telemetry Orders (From admission, onward)               24 Hour Telemetry Monitoring  Continuous x 24 Hours (Telem)        Expiring   Question:  Reason for 24 Hour Telemetry  Answer:  Metabolic/electrolyte disturbance with high probability of dysrhythmia. K level <3 or >6 OR KCL infusion >10mEq/hr                     Telemetry Reviewed: Sinus rhythm  Indication for Continued Telemetry Use: Metabolic/electrolyte disturbance with high probability of dysrhythmia               Lab Results: I have reviewed the following results:   Results from last 7 days   Lab Units 05/24/25  0522   WBC Thousand/uL 10.11   HEMOGLOBIN g/dL 9.7*   HEMATOCRIT % 30.6*   PLATELETS Thousands/uL 180   SEGS PCT % 72   LYMPHO PCT % 16   MONO PCT % 10   EOS PCT % 1     Results from last 7 days   Lab Units 05/26/25  0932 05/22/25  0537 05/21/25  1521   SODIUM mmol/L 142   < > 138   POTASSIUM mmol/L 2.9*   < > 2.8*   CHLORIDE mmol/L 106   < > 101   CO2 mmol/L 30    < > 28   BUN mg/dL 17   < > 11   CREATININE mg/dL 1.02   < > 1.10   ANION GAP mmol/L 6   < > 9   CALCIUM mg/dL 7.8*   < > 7.0*   ALBUMIN g/dL  --   --  3.4*   TOTAL BILIRUBIN mg/dL  --   --  0.91   ALK PHOS U/L  --   --  136*   ALT U/L  --   --  13   AST U/L  --   --  34   GLUCOSE RANDOM mg/dL 99   < > 40*    < > = values in this interval not displayed.     Results from last 7 days   Lab Units 05/21/25  1521   INR  1.63*     Results from last 7 days   Lab Units 05/26/25  1152 05/26/25  0720 05/26/25  0226 05/25/25  2100 05/25/25  1613 05/25/25  1135 05/25/25  0619 05/24/25  2159 05/24/25  1622 05/24/25  1122 05/24/25  0755 05/23/25  2113   POC GLUCOSE mg/dl 107 84 150* 228* 124 171* 183* 267* 294* 192* 195* 350*     Results from last 7 days   Lab Units 05/21/25  1521   HEMOGLOBIN A1C % 6.9*     Results from last 7 days   Lab Units 05/21/25  1938   LACTIC ACID mmol/L 0.8   PROCALCITONIN ng/ml 0.43*       Recent Cultures (last 7 days):   Results from last 7 days   Lab Units 05/22/25  1722   BLOOD CULTURE  No Growth at 72 hrs.  No Growth at 72 hrs.       Imaging Results Review: I personally reviewed the following image studies/reports in PACS and discussed pertinent findings with Radiology: CT chest and CT abdomen/pelvis. My interpretation of the radiology images/reports is: Lab results reviewed.      Last 24 Hours Medication List:     Current Facility-Administered Medications:     acetaminophen (TYLENOL) tablet 650 mg, Q6H PRN    aluminum-magnesium hydroxide-simethicone (MAALOX) oral suspension 30 mL, Q6H PRN    apixaban (ELIQUIS) tablet 5 mg, BID    aspirin chewable tablet 81 mg, Daily    atorvastatin (LIPITOR) tablet 10 mg, Daily With Dinner    Cholecalciferol (VITAMIN D3) tablet 1,000 Units, Daily    fludrocortisone (FLORINEF) tablet 0.2 mg, Daily    [START ON 5/27/2025] hydrocortisone (CORTEF) tablet 15 mg, QAM **AND** hydrocortisone (CORTEF) tablet 10 mg, Daily With Lunch    insulin glargine (LANTUS)  subcutaneous injection 10 Units 0.1 mL, HS    insulin lispro (HumALOG/ADMELOG) 100 units/mL subcutaneous injection 1-5 Units, TID AC **AND** Fingerstick Glucose (POCT), TID AC    insulin lispro (HumALOG/ADMELOG) 100 units/mL subcutaneous injection 1-5 Units, HS    insulin lispro (HumALOG/ADMELOG) 100 units/mL subcutaneous injection 3 Units, TID With Meals    magnesium sulfate 4 g/100 mL IVPB (premix) 4 g, Once    midodrine (PROAMATINE) tablet 5 mg, TID AC    ondansetron (ZOFRAN) injection 4 mg, Q6H PRN    potassium chloride (Klor-Con M20) CR tablet 40 mEq, BID    potassium chloride 40 mEq IVPB (premix), Once    senna (SENOKOT) tablet 8.6 mg, Daily    sertraline (ZOLOFT) tablet 100 mg, Daily    Administrative Statements   Today, Patient Was Seen By: Miguelito Martinez MD  I have spent a total time of 31 minutes in caring for this patient on the day of the visit/encounter including Diagnostic results, Risks and benefits of tx options, Instructions for management, Patient and family education, Importance of tx compliance, Risk factor reductions, Impressions, Counseling / Coordination of care, Documenting in the medical record, Reviewing/placing orders in the medical record (including tests, medications, and/or procedures), Obtaining or reviewing history  , and Communicating with other healthcare professionals .    **Please Note: This note may have been constructed using a voice recognition system.**

## 2025-05-26 NOTE — PROGRESS NOTES
Progress Note - Endocrinology   Name: Karson You 70 y.o. male I MRN: [id number]  Unit/Bed#: [facility] 815-01 I Date of Admission: 5/21/2025   Date of Service: 5/26/2025 I Hospital Day: 4     Assessment & Plan  Syncope and collapse  In the setting of hypoglycemia as evidenced by blood sugar of 40 mg/dL on BMP on admission.   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  Management per primary team, rest as noted below  Type 1 diabetes mellitus (HCC)  Lab Results   Component Value Date    HGBA1C 6.9 (H) 05/21/2025       Recent Labs     05/25/25  1613 05/25/25  2100 05/26/25  0226 05/26/25  0720   POCGLU 124 228* 150* 84     Blood Sugar Average: Last 72 hrs:  (P) [id number]  Type 1 diabetes mellitus with hyper and hypoglycemia with brittle glycemic control, previously admitted with hypoglycemia and known to endocrinology service  At home he takes Toujeo 12 units nightly, Humalog 4 units 3 times daily before meal, correctional scale insulin 1 unit for every 50 mg over 150    Plan:  Dec current regimen to Lantus 8 units nightly and Humalog 2 with meals, correctional insulin  Monitor for hypoglycemia treat according to protocol  Continue Accu-Cheks before meals and at bedtime  Encourage p.o. intake.  Goal blood sugar while in the [id number] mg/dL  When planned for discharge, can go on current inpatient regimen with holding parameters on mealtime insulin if he is not eating.  Will not advise PTA insulin regimen given tight glycemic control.    Adrenal insufficiency (HCC)  At home takes hydrocortisone 15 mg in the morning, 10 mg in the afternoon  Weaned hydrocortisone down to 20 mg in a.m. and 15 mg in p.m. yesterday, patient continues to be hemodynamically stable and asymptomatic, switch to home dose of 15 mg in a.m. and 10 mg in p.m. today to optimize for discharge whenever cleared by primary team for hypokalemia.  Hypoglycemia  Plan as  above  Now resolved  Orthostatic hypotension  Continue fludrocortisone 0.2 mg daily    Progress Note - Karson You 70 y.o. male MRN: [id number]    Unit/Bed#: [facility] 815-01 Encounter: [id number]      Subjective:   Patient seen at bedside, no acute distress and reports good appetite, denies nausea, vomiting, lightheadedness. Hemodynamically stable. Tight glycemic control.    Objective:     Vitals: Blood pressure 152/86, pulse 66, temperature 97.6 °F (36.4 °C), resp. rate 14, weight 87.1 kg (192 lb), SpO2 98%.,There is no height or weight on file to calculate BMI.    Physical Exam:  General Appearance: awake, appears stated age and cooperative  Head: Normocephalic, without obvious abnormality  Extremities: Sitting propped up in bed  Skin: Skin color and temperature normal.   Pulm: no labored breathing  Neuro: Alert awake oriented    Lab, Imaging and other studies:   Pertinent labs have been reviewed.       Portions of the record may have been created with voice recognition software.

## 2025-05-26 NOTE — ASSESSMENT & PLAN NOTE
At home takes hydrocortisone 15 mg in the morning, 10 mg in the afternoon  Weaned hydrocortisone down to 20 mg in a.m. and 15 mg in p.m. yesterday, patient continues to be hemodynamically stable and asymptomatic, switch to home dose of 15 mg in a.m. and 10 mg in p.m. today to optimize for discharge whenever cleared by primary team for hypokalemia.

## 2025-05-26 NOTE — PLAN OF CARE
Problem: PAIN - ADULT  Goal: Verbalizes/displays adequate comfort level or baseline comfort level  Description: Interventions:  - Encourage patient to monitor pain and request assistance  - Assess pain using appropriate pain scale  - Administer analgesics as ordered based on type and severity of pain and evaluate response  - Implement non-pharmacological measures as appropriate and evaluate response  - Consider cultural and social influences on pain and pain management  - Notify physician/advanced practitioner if interventions unsuccessful or patient reports new pain  - Educate patient/family on pain management process including their role and importance of  reporting pain   - Provide non-pharmacologic/complimentary pain relief interventions  Outcome: Progressing     Problem: INFECTION - ADULT  Goal: Absence or prevention of progression during hospitalization  Description: INTERVENTIONS:  - Assess and monitor for signs and symptoms of infection  - Monitor lab/diagnostic results  - Monitor all insertion sites, i.e. indwelling lines, tubes, and drains  - Monitor endotracheal if appropriate and nasal secretions for changes in amount and color  - [place] appropriate cooling/warming therapies per order  - Administer medications as ordered  - Instruct and encourage patient and family to use good hand hygiene technique  - Identify and instruct in appropriate isolation precautions for identified infection/condition  Outcome: Progressing

## 2025-05-27 ENCOUNTER — TREATMENT (OUTPATIENT)
Dept: ENDOCRINOLOGY | Facility: CLINIC | Age: 70
End: 2025-05-27
Payer: COMMERCIAL

## 2025-05-27 DIAGNOSIS — E10.65 TYPE 1 DIABETES MELLITUS WITH HYPERGLYCEMIA (HCC): Primary | ICD-10-CM

## 2025-05-27 LAB
ANION GAP SERPL CALCULATED.3IONS-SCNC: 8 MMOL/L (ref 4–13)
BACTERIA BLD CULT: NORMAL
BACTERIA BLD CULT: NORMAL
BUN SERPL-MCNC: 17 MG/DL (ref 5–25)
CALCIUM SERPL-MCNC: 7.7 MG/DL (ref 8.4–10.2)
CHLORIDE SERPL-SCNC: 107 MMOL/L (ref 96–108)
CO2 SERPL-SCNC: 28 MMOL/L (ref 21–32)
CREAT SERPL-MCNC: 0.88 MG/DL (ref 0.6–1.3)
GFR SERPL CREATININE-BSD FRML MDRD: 88 ML/MIN/1.73SQ M
GLUCOSE SERPL-MCNC: 146 MG/DL (ref 65–140)
GLUCOSE SERPL-MCNC: 168 MG/DL (ref 65–140)
GLUCOSE SERPL-MCNC: 177 MG/DL (ref 65–140)
GLUCOSE SERPL-MCNC: 184 MG/DL (ref 65–140)
GLUCOSE SERPL-MCNC: 53 MG/DL (ref 65–140)
GLUCOSE SERPL-MCNC: 59 MG/DL (ref 65–140)
GLUCOSE SERPL-MCNC: 60 MG/DL (ref 65–140)
MAGNESIUM SERPL-MCNC: 2 MG/DL (ref 1.9–2.7)
POTASSIUM SERPL-SCNC: 3 MMOL/L (ref 3.5–5.3)
SODIUM SERPL-SCNC: 143 MMOL/L (ref 135–147)

## 2025-05-27 PROCEDURE — 99232 SBSQ HOSP IP/OBS MODERATE 35: CPT | Performed by: INTERNAL MEDICINE

## 2025-05-27 PROCEDURE — 83735 ASSAY OF MAGNESIUM: CPT | Performed by: INTERNAL MEDICINE

## 2025-05-27 PROCEDURE — 95251 CONT GLUC MNTR ANALYSIS I&R: CPT | Performed by: INTERNAL MEDICINE

## 2025-05-27 PROCEDURE — 99233 SBSQ HOSP IP/OBS HIGH 50: CPT | Performed by: INTERNAL MEDICINE

## 2025-05-27 PROCEDURE — 82948 REAGENT STRIP/BLOOD GLUCOSE: CPT

## 2025-05-27 PROCEDURE — 80048 BASIC METABOLIC PNL TOTAL CA: CPT | Performed by: INTERNAL MEDICINE

## 2025-05-27 RX ORDER — INSULIN GLARGINE 100 [IU]/ML
4 INJECTION, SOLUTION SUBCUTANEOUS
Status: DISCONTINUED | OUTPATIENT
Start: 2025-05-27 | End: 2025-05-28 | Stop reason: HOSPADM

## 2025-05-27 RX ADMIN — INSULIN LISPRO 1 UNITS: 100 INJECTION, SOLUTION INTRAVENOUS; SUBCUTANEOUS at 16:58

## 2025-05-27 RX ADMIN — APIXABAN 5 MG: 5 TABLET, FILM COATED ORAL at 08:19

## 2025-05-27 RX ADMIN — HYDROCORTISONE 10 MG: 10 TABLET ORAL at 11:28

## 2025-05-27 RX ADMIN — INSULIN GLARGINE 4 UNITS: 100 INJECTION, SOLUTION SUBCUTANEOUS at 22:33

## 2025-05-27 RX ADMIN — ASPIRIN 81 MG CHEWABLE TABLET 81 MG: 81 TABLET CHEWABLE at 08:19

## 2025-05-27 RX ADMIN — POTASSIUM CHLORIDE 40 MEQ: 1500 TABLET, EXTENDED RELEASE ORAL at 11:26

## 2025-05-27 RX ADMIN — HYDROCORTISONE 15 MG: 5 TABLET ORAL at 08:19

## 2025-05-27 RX ADMIN — FLUDROCORTISONE ACETATE 0.2 MG: 0.1 TABLET ORAL at 08:20

## 2025-05-27 RX ADMIN — INSULIN LISPRO 2 UNITS: 100 INJECTION, SOLUTION INTRAVENOUS; SUBCUTANEOUS at 11:28

## 2025-05-27 RX ADMIN — POTASSIUM CHLORIDE 40 MEQ: 1500 TABLET, EXTENDED RELEASE ORAL at 07:44

## 2025-05-27 RX ADMIN — ATORVASTATIN CALCIUM 10 MG: 10 TABLET, FILM COATED ORAL at 16:59

## 2025-05-27 RX ADMIN — SERTRALINE HYDROCHLORIDE 100 MG: 100 TABLET ORAL at 08:19

## 2025-05-27 RX ADMIN — SENNOSIDES 8.6 MG: 8.6 TABLET, FILM COATED ORAL at 08:19

## 2025-05-27 RX ADMIN — INSULIN LISPRO 1 UNITS: 100 INJECTION, SOLUTION INTRAVENOUS; SUBCUTANEOUS at 22:33

## 2025-05-27 RX ADMIN — POTASSIUM CHLORIDE 40 MEQ: 1500 TABLET, EXTENDED RELEASE ORAL at 16:59

## 2025-05-27 RX ADMIN — INSULIN LISPRO 2 UNITS: 100 INJECTION, SOLUTION INTRAVENOUS; SUBCUTANEOUS at 16:58

## 2025-05-27 RX ADMIN — Medication 1000 UNITS: at 08:19

## 2025-05-27 RX ADMIN — APIXABAN 5 MG: 5 TABLET, FILM COATED ORAL at 17:00

## 2025-05-27 NOTE — PROGRESS NOTES
"Progress Note - Hospitalist   Name: Karson You 70 y.o. male I MRN: [id number]  Unit/Bed#: [facility] 815-01 I Date of Admission: 5/21/2025   Date of Service: 5/27/2025 I Hospital Day: 5     Assessment & Plan  Syncope and collapse  Patient presents syncope and collapse noted to have hypoglycemia with glucose of 40  Likely multifactorial  Patient with history of internal insufficiency and orthostatic hypotension  2D echo EF 64% grade 2 diastolic dysfunction, aortic valve sclerosis reported  Received IV fluids  Symptomatically improved  Safe ambulation discussed and encouraged  Supportive cares  Paroxysmal A-fib ([facility])  Continue Eliquis for anticoagulation  Adrenal insufficiency ([facility])  History of renal insufficiency  Continue hydrocortisone 15 mg a.m., 10 mg p.m.  Endocrinology input noted  Outpatient follow-up  Hypoglycemia  Present on admission glucose of 40  Hypoglycemia resolved  Encourage adequate p.o. intake  Accu-Chek monitoring and hypoglycemia remedial measures encouraged  Orthostatic hypotension  History of orthostatic hypotension  Continue midodrine 5 mg 3 times daily  Continue fludrocortisone 0.2 mg p.o. daily  Encourage abdominal binder, compression stockings  Safe ambulation encouraged  Avoid hypotension    History of cerebrovascular accident (CVA) in adulthood  Continue aspirin, atorvastatin  Iron deficiency anemia  Monitor hemoglobin    No results for input(s): \"HGB\" in the last 72 hours.      CKD (chronic kidney disease) stage 5, GFR less than 15 ml/min ([facility])  Lab Results   Component Value Date    EGFR 88 05/27/2025    EGFR 75 05/26/2025    EGFR 74 05/25/2025    CREATININE 0.88 05/27/2025    CREATININE 1.02 05/26/2025    CREATININE 1.03 05/25/2025   Baseline creatinine 0.9-1.1  Monitor kidney function  SIRS (systemic inflammatory response syndrome) ([facility])  SIRS present on admission  RSV/influenza/COVID-negative  Blood cultures negative x 2 at 48 hours  CT chest abdomen pelvis no acute intrathoracic " intra-abdominal findings  SIRS resolved  Chronic heart failure with preserved ejection fraction (HCC)  Wt Readings from Last 3 Encounters:   25 87.1 kg (192 lb)       2D echo reveals EF 64%, grade 2 diastolic dysfunction  Continue low-salt diet  Outpatient follow-up  PAD (peripheral artery disease) (HCC)  Continue aspirin, atorvastatin  DISH (diffuse idiopathic skeletal hyperostosis)  Noted on admission CT abdomen pelvis as part of trauma workup  Outpatient follow-up  Hypocalcemia  Noted on admission with corrected level of 7.5.  Likely in the setting of CKD 5, as well as vitamin D deficiency.  Monitor  Hypokalemia  Potassium 2.9  Replete  Reports taking 40 mEq potassium twice daily at home  Continue potassium 40 mill equivalents increased to 3 times daily dosing  Monitor potassium levels    Type 1 diabetes mellitus (HCC)  Lab Results   Component Value Date    HGBA1C 6.9 (H) 2025       Recent Labs     25  20525  0719 25  0754 25  0811   POCGLU 108 53* 60* 168*       Blood Sugar Average: Last 72 hrs:  (P) 155.0625    Insulin therapy per endocrinology  Monitor Accu-Cheks  Avoid hypoglycemia  Hypoglycemia protocol in place    Hypomagnesemia  Magnesium 1.8  Replete  Monitor  VTE Pharmacologic Prophylaxis:   Pharmacologic: Apixaban (Eliquis)  Mechanical VTE Prophylaxis in Place: No    Patient Centered Rounds: I have performed bedside rounds with nursing staff today.        Time Spent for Care: 1 hour.  More than 50% of total time spent on counseling and coordination of care as described above.    Current Length of Stay: 5 day(s)    Current Patient Status: Inpatient     Code Status: Level 1 - Full Code      Subjective:   nad    Objective:     Vitals:   Temp (24hrs), Av °F (36.7 °C), Min:97.8 °F (36.6 °C), Max:98.1 °F (36.7 °C)    Temp:  [97.8 °F (36.6 °C)-98.1 °F (36.7 °C)] 97.8 °F (36.6 °C)  HR:  [63-73] 73  Resp:  [16-17] 16  BP: (149-152)/(77-82) 152/77  SpO2:  [95 %-99 %] 95  %  There is no height or weight on file to calculate BMI.     Input and Output Summary (last 24 hours):       Intake/Output Summary (Last 24 hours) at 5/27/2025 0914  Last data filed at 5/27/2025 0352  Gross per 24 hour   Intake 400 ml   Output 2100 ml   Net -1700 ml       Physical Exam:     Physical Exam  HENT:      Head: Normocephalic and atraumatic.     Eyes:      Pupils: Pupils are equal, round, and reactive to light.       Cardiovascular:      Rate and Rhythm: Normal rate and regular rhythm.   Pulmonary:      Effort: Pulmonary effort is normal.      Breath sounds: Normal breath sounds.   Abdominal:      General: Abdomen is flat.      Palpations: Abdomen is soft.     Musculoskeletal:      Cervical back: Normal range of motion and neck supple.     Skin:     General: Skin is warm and dry.     Neurological:      General: No focal deficit present.      Mental Status: He is alert.     Psychiatric:         Mood and Affect: Mood normal.         Behavior: Behavior normal.         Additional Data:     Labs:    Results from last 7 days   Lab Units 05/24/25  0522   WBC Thousand/uL 10.11   HEMOGLOBIN g/dL 9.7*   HEMATOCRIT % 30.6*   PLATELETS Thousands/uL 180   SEGS PCT % 72   LYMPHO PCT % 16   MONO PCT % 10   EOS PCT % 1     Results from last 7 days   Lab Units 05/27/25  0459 05/22/25  0537 05/21/25  1521 05/21/25  1511   POTASSIUM mmol/L 3.0*   < > 2.8*  --    CHLORIDE mmol/L 107   < > 101  --    CO2 mmol/L 28   < > 28  --    CO2, I-STAT mmol/L  --   --   --  33*   BUN mg/dL 17   < > 11  --    CREATININE mg/dL 0.88   < > 1.10  --    CALCIUM mg/dL 7.7*   < > 7.0*  --    ALK PHOS U/L  --   --  136*  --    ALT U/L  --   --  13  --    AST U/L  --   --  34  --    GLUCOSE, ISTAT mg/dl  --   --   --  50*    < > = values in this interval not displayed.     Results from last 7 days   Lab Units 05/21/25  1521   INR  1.63*           Recent Cultures (last 7 days):     Results from last 7 days   Lab Units 05/22/25  1722   BLOOD  CULTURE  No Growth After 4 Days.  No Growth After 4 Days.       Last 24 Hours Medication List:   Current Facility-Administered Medications   Medication Dose Route Frequency Provider Last Rate    acetaminophen  650 mg Oral Q6H PRN Miguelito Martinez MD      aluminum-magnesium hydroxide-simethicone  30 mL Oral Q6H PRN Faiza Casas MD      apixaban  5 mg Oral BID Faiza Casas MD      aspirin  81 mg Oral Daily Faiza Casas MD      atorvastatin  10 mg Oral Daily With Dinner Faiza Casas MD      cholecalciferol  1,000 Units Oral Daily Faiza Casas MD      fludrocortisone  0.2 mg Oral Daily Faiza Casas MD      hydrocortisone  15 mg Oral QAM Zhane Porter MD      And    hydrocortisone  10 mg Oral Daily With Lunch Zhane Porter MD      insulin glargine  8 Units Subcutaneous HS Zhane Porter MD      insulin lispro  1-5 Units Subcutaneous TID AC Zhane Porter MD      insulin lispro  1-5 Units Subcutaneous HS Zhane Porter MD      insulin lispro  2 Units Subcutaneous TID With Meals Zhane Porter MD      midodrine  5 mg Oral TID AC Faiza Casas MD      ondansetron  4 mg Intravenous Q6H PRN Faiza Casas MD      potassium chloride  40 mEq Oral TID With Meals Miguelito Martinez MD      senna  1 tablet Oral Daily Faiza Casas MD      sertraline  100 mg Oral Daily Faiza Casas MD          Today, Patient Was Seen By: Mu Blandon DO    ** Please Note: Dictation voice to text software may have been used in the creation of this document. **

## 2025-05-27 NOTE — PLAN OF CARE
Problem: PAIN - ADULT  Goal: Verbalizes/displays adequate comfort level or baseline comfort level  Description: Interventions:  - Encourage patient to monitor pain and request assistance  - Assess pain using appropriate pain scale  - Administer analgesics as ordered based on type and severity of pain and evaluate response  - Implement non-pharmacological measures as appropriate and evaluate response  - Consider cultural and social influences on pain and pain management  - Notify physician/advanced practitioner if interventions unsuccessful or patient reports new pain  - Educate patient/family on pain management process including their role and importance of  reporting pain   - Provide non-pharmacologic/complimentary pain relief interventions  Outcome: Progressing     Problem: INFECTION - ADULT  Goal: Absence or prevention of progression during hospitalization  Description: INTERVENTIONS:  - Assess and monitor for signs and symptoms of infection  - Monitor lab/diagnostic results  - Monitor all insertion sites, i.e. indwelling lines, tubes, and drains  - Monitor endotracheal if appropriate and nasal secretions for changes in amount and color  - [place] appropriate cooling/warming therapies per order  - Administer medications as ordered  - Instruct and encourage patient and family to use good hand hygiene technique  - Identify and instruct in appropriate isolation precautions for identified infection/condition  Outcome: Progressing     Problem: SAFETY ADULT  Goal: Patient will remain free of falls  Description: INTERVENTIONS:  - Educate patient/family on patient safety including physical limitations  - Instruct patient to call for assistance with activity   - Consider consulting OT/PT to assist with strengthening/mobility based on AM PAC & JH-HLM score  - Consult OT/PT to assist with strengthening/mobility   - Keep Call bell within reach  - Keep bed low and locked with side rails adjusted as appropriate  - Keep  care items and personal belongings within reach  - Initiate and maintain comfort rounds  - Make Fall Risk Sign visible to staff  - Offer Toileting every 2 Hours, in advance of need  - Initiate/Maintain bed/chair alarm  - Obtain necessary fall risk management equipment: walker  - Apply yellow socks and bracelet for high fall risk patients  - Consider moving patient to room near nurses station  Outcome: Progressing  Goal: Maintain or return to baseline ADL function  Description: INTERVENTIONS:  -  Assess patient's ability to carry out ADLs; assess patient's baseline for ADL function and identify physical deficits which impact ability to perform ADLs (bathing, care of mouth/teeth, toileting, grooming, dressing, etc.)  - Assess/evaluate cause of self-care deficits   - Assess range of motion  - Assess patient's mobility; develop plan if impaired  - Assess patient's need for assistive devices and provide as appropriate  - Encourage maximum independence but intervene and supervise when necessary  - Involve family in performance of ADLs  - Assess for home care needs following discharge   - Consider OT consult to assist with ADL evaluation and planning for discharge  - Provide patient education as appropriate  - Monitor functional capacity and physical performance, use of AM PAC & JH-HLM   - Monitor gait, balance and fatigue with ambulation    Outcome: Progressing  Goal: Maintains/Returns to pre admission functional level  Description: INTERVENTIONS:  - Perform AM-PAC 6 Click Basic Mobility/ Daily Activity assessment daily.  - Set and communicate daily mobility goal to care team and patient/family/caregiver.   - Collaborate with rehabilitation services on mobility goals if consulted  - Stand patient 3 times a day  - Ambulate patient 3 times a day  - Out of bed to chair 3 times a day   - Out of bed for meals 3 times a day  - Out of bed for toileting  - Record patient progress and toleration of activity level   Outcome:  Progressing     Problem: DISCHARGE PLANNING  Goal: Discharge to home or other facility with appropriate resources  Description: INTERVENTIONS:  - Identify barriers to discharge w/patient and caregiver  - Arrange for needed discharge resources and transportation as appropriate  - Identify discharge learning needs (meds, wound care, etc.)  - Arrange for interpretive services to assist at discharge as needed  - Refer to Case Management Department for coordinating discharge planning if the patient needs post-hospital services based on physician/advanced practitioner order or complex needs related to functional status, cognitive ability, or social support system  Outcome: Progressing     Problem: Knowledge Deficit  Goal: Patient/family/caregiver demonstrates understanding of disease process, treatment plan, medications, and discharge instructions  Description: Complete learning assessment and assess knowledge base.  Interventions:  - Provide teaching at level of understanding  - Provide teaching via preferred learning methods  Outcome: Progressing     Problem: Prexisting or High Potential for Compromised Skin Integrity  Goal: Skin integrity is maintained or improved  Description: INTERVENTIONS:  - Identify patients at risk for skin breakdown  - Assess and monitor skin integrity including under and around medical devices   - Assess and monitor nutrition and hydration status  - Monitor labs  - Assess for incontinence   - Turn and reposition patient  - Assist with mobility/ambulation  - Relieve pressure over rea prominences   - Avoid friction and shearing  - Provide appropriate hygiene as needed including keeping skin clean and dry  - Evaluate need for skin moisturizer/barrier cream  - Collaborate with interdisciplinary team  - Patient/family teaching  - Consider wound care consult    Assess:  - Review Mode scale daily  - Clean and moisturize skin every shift  - Inspect skin when repositioning, toileting, and assisting  with ADLS  - Assess extremities for adequate circulation and sensation     Bed Management:  - Have minimal linens on bed & keep smooth, unwrinkled  - Change linens as needed when moist or perspiring  - Avoid sitting or lying in one position for more than 2 hours while in bed?Keep HOB at 30 degrees   - Toileting:  - Offer bedside commode  - Assess for incontinence every 2 hours  - Use incontinent care products after each incontinent episode such as gray wipes    Activity:  - Mobilize patient 3 times a day  - Encourage activity and walks on unit  - Encourage or provide ROM exercises   - Turn and reposition patient every 2 Hours  - Use appropriate equipment to lift or move patient in bed    Skin Care:  - Avoid use of baby powder, tape, friction and shearing, hot water or constrictive clothing  - Relieve pressure over bony prominences using pillows  - Do not massage red bony areas    Next Steps:  - Teach patient strategies to minimize risks such as weight shifting  - Consider consults to  interdisciplinary teams such as PT/OT  Outcome: Progressing

## 2025-05-27 NOTE — PLAN OF CARE
Problem: PAIN - ADULT  Goal: Verbalizes/displays adequate comfort level or baseline comfort level  Description: Interventions:  - Encourage patient to monitor pain and request assistance  - Assess pain using appropriate pain scale  - Administer analgesics as ordered based on type and severity of pain and evaluate response  - Implement non-pharmacological measures as appropriate and evaluate response  - Consider cultural and social influences on pain and pain management  - Notify physician/advanced practitioner if interventions unsuccessful or patient reports new pain  - Educate patient/family on pain management process including their role and importance of  reporting pain   - Provide non-pharmacologic/complimentary pain relief interventions  Outcome: Progressing     Problem: INFECTION - ADULT  Goal: Absence or prevention of progression during hospitalization  Description: INTERVENTIONS:  - Assess and monitor for signs and symptoms of infection  - Monitor lab/diagnostic results  - Monitor all insertion sites, i.e. indwelling lines, tubes, and drains  - Monitor endotracheal if appropriate and nasal secretions for changes in amount and color  - [place] appropriate cooling/warming therapies per order  - Administer medications as ordered  - Instruct and encourage patient and family to use good hand hygiene technique  - Identify and instruct in appropriate isolation precautions for identified infection/condition  Outcome: Progressing

## 2025-05-27 NOTE — ASSESSMENT & PLAN NOTE
Lab Results   Component Value Date    EGFR 88 05/27/2025    EGFR 75 05/26/2025    EGFR 74 05/25/2025    CREATININE 0.88 05/27/2025    CREATININE 1.02 05/26/2025    CREATININE 1.03 05/25/2025   Baseline creatinine 0.9-1.1  Monitor kidney function

## 2025-05-27 NOTE — NURSING NOTE
Patient with a glucose of 53, given 15g of carb in the form of OJ.  Patient is alert and awake, eating breakfast.  Held 3 units of Humalog to be given w/ meal.

## 2025-05-27 NOTE — ASSESSMENT & PLAN NOTE
Lab Results   Component Value Date    HGBA1C 6.9 (H) 05/21/2025       Recent Labs     05/27/25  0719 05/27/25  0754 05/27/25  0811 05/27/25  1111   POCGLU 53* 60* 168* 146*     Blood Sugar Average: Last 72 hrs:  (P) 154.6805017053005068  Type 1 diabetes mellitus with hyper and hypoglycemia with brittle glycemic control, previously admitted with hypoglycemia and known to endocrinology service  At home he takes Toujeo 12 units nightly, Humalog 4 units 3 times daily before meal, correctional scale insulin 1 unit for every 50 mg over 150  Currently takes Lantus 8 units nightly, Humalog 2 units before meals  Blood glucose reviewed-patient noted to have    Plan:  Decrease Lantus to 4 units nightly  Continue Humalog 2 units before meals  Continue sliding scale algorithm 2 before meals and 1 at bedtime.  If still hypoglycemic, consider discontinuing bedtime correctional scale and decreasing mealtime sliding scale algorithm 1.  Continue Accu-Cheks before meals and at bedtime  Encourage p.o. intake.  Goal blood sugar while in the apjngdth-424-328 mg/dL  When planned for discharge, can go on current inpatient regimen with holding parameters on mealtime insulin if he is not eating.  Will not advise PTA insulin regimen given tight glycemic control.  Endocrinology will continue following.

## 2025-05-27 NOTE — ASSESSMENT & PLAN NOTE
At home takes hydrocortisone 15 mg in the morning, 10 mg in the afternoon  Has been weaned down to home dose steroids  Continue monitoring vital signs and labs

## 2025-05-27 NOTE — ASSESSMENT & PLAN NOTE
Lab Results   Component Value Date    HGBA1C 6.9 (H) 05/21/2025       Recent Labs     05/26/25 2051 05/27/25  0719 05/27/25  0754 05/27/25  0811   POCGLU 108 53* 60* 168*       Blood Sugar Average: Last 72 hrs:  (P) 155.0625    Insulin therapy per endocrinology  Monitor Accu-Cheks  Avoid hypoglycemia  Hypoglycemia protocol in place

## 2025-05-27 NOTE — PROGRESS NOTES
Progress Note - Endocrinology   Name: Karson You 70 y.o. male I MRN: [id number]  Unit/Bed#: [facility] 815-01 I Date of Admission: 5/21/2025   Date of Service: 5/27/2025 I Hospital Day: 5    Assessment & Plan  Syncope and collapse  In the setting of hypoglycemia as evidenced by blood sugar of 40 mg/dL on BMP on admission.   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  Management per primary team, rest as noted below  Type 1 diabetes mellitus (HCC)  Lab Results   Component Value Date    HGBA1C 6.9 (H) 05/21/2025       Recent Labs     05/27/25  0719 05/27/25  0754 05/27/25  0811 05/27/25  1111   POCGLU 53* 60* 168* 146*     Blood Sugar Average: Last 72 hrs:  (P) 154.2956981398142289  Type 1 diabetes mellitus with hyper and hypoglycemia with brittle glycemic control, previously admitted with hypoglycemia and known to endocrinology service  At home he takes Toujeo 12 units nightly, Humalog 4 units 3 times daily before meal, correctional scale insulin 1 unit for every 50 mg over 150  Currently takes Lantus 8 units nightly, Humalog 2 units before meals  Blood glucose reviewed-patient noted to have    Plan:  Decrease Lantus to 4 units nightly  Continue Humalog 2 units before meals  Continue sliding scale algorithm 2 before meals and 1 at bedtime.  If still hypoglycemic, consider discontinuing bedtime correctional scale and decreasing mealtime sliding scale algorithm 1.  Continue Accu-Cheks before meals and at bedtime  Encourage p.o. intake.  Goal blood sugar while in the wbsermfj-309-080 mg/dL  When planned for discharge, can go on current inpatient regimen with holding parameters on mealtime insulin if he is not eating.  Will not advise PTA insulin regimen given tight glycemic control.  Endocrinology will continue following.    Adrenal insufficiency (HCC)  At home takes hydrocortisone 15 mg in the morning, 10 mg in the afternoon  Has been weaned down  to home dose steroids  Continue monitoring vital signs and labs  Hypoglycemia  Plan as above  Now resolved  Orthostatic hypotension  Continue fludrocortisone 0.2 mg daily    24 Hour Events : No significant overnight events  Subjective : Patient seen and examined at the bedside, not in acute distress at the time of my evaluation.  Endorses feeling well, denies nausea, vomiting.  Had episode of hypoglycemia early this morning, denies symptoms at that time.    Objective :  Temp:  [97.8 °F (36.6 °C)-98.1 °F (36.7 °C)] 97.8 °F (36.6 °C)  HR:  [63-73] 73  BP: (149-152)/(77-82) 152/77  Resp:  [16-17] 16  SpO2:  [95 %-99 %] 95 %  O2 Device: None (Room air)    Physical Exam  Vitals and nursing note reviewed.   Constitutional:       General: He is not in acute distress.     Appearance: Normal appearance. He is not ill-appearing, toxic-appearing or diaphoretic.   HENT:      Head: Normocephalic and atraumatic.      Nose: Nose normal.      Mouth/Throat:      Pharynx: Oropharynx is clear.     Eyes:      General: No scleral icterus.        Right eye: No discharge.         Left eye: No discharge.      Extraocular Movements: Extraocular movements intact.      Conjunctiva/sclera: Conjunctivae normal.     Pulmonary:      Effort: Pulmonary effort is normal. No respiratory distress.   Abdominal:      General: There is no distension.     Musculoskeletal:         General: Normal range of motion.      Cervical back: Normal range of motion and neck supple.     Skin:     General: Skin is warm and dry.      Coloration: Skin is not jaundiced or pale.     Neurological:      Mental Status: He is alert and oriented to person, place, and time. Mental status is at baseline.     Psychiatric:         Mood and Affect: Mood normal.         Behavior: Behavior normal.         Thought Content: Thought content normal.         Judgment: Judgment normal.         Lab Results: I have reviewed the following results:CBC/BMP:   .     05/27/25  0459   SODIUM 143    K 3.0*      CO2 28   BUN 17   CREATININE 0.88   GLUC 59*   MG 2.0    , Creatinine Clearance: CrCl cannot be calculated (Unknown ideal weight.)., LFTs: No new results in last 24 hours.   Recent Labs     05/27/25  0459 05/27/25  0719 05/27/25  0754 05/27/25  0811 05/27/25  1111   POCGLU  --    < > 60* 168* 146*   GLUC 59*  --   --   --   --     < > = values in this interval not displayed.       Imaging Results Review: No pertinent imaging studies reviewed.  Other Study Results Review: No additional pertinent studies reviewed.

## 2025-05-27 NOTE — PROGRESS NOTES
CGM data review::  Device: dexcom Dates: 4/29/25-5/12/25 Usage: 70 % Av glu: 194 mg/dL  SD: 74 mg/dL CV: 37.1  % GMI: 8 %  TIR: 44 %  TAR: 32+21 %  TBR: 1+2 %    Glycemic patters:  not enough scanning but generally post prandial hyperglycemia. Overall stable.  Hypoglycemia: rare.

## 2025-05-28 ENCOUNTER — PATIENT MESSAGE (OUTPATIENT)
Dept: ENDOCRINOLOGY | Facility: CLINIC | Age: 70
End: 2025-05-28

## 2025-05-28 VITALS
RESPIRATION RATE: 17 BRPM | OXYGEN SATURATION: 97 % | WEIGHT: 192 LBS | DIASTOLIC BLOOD PRESSURE: 81 MMHG | SYSTOLIC BLOOD PRESSURE: 156 MMHG | TEMPERATURE: 98.7 F | HEART RATE: 80 BPM

## 2025-05-28 LAB
ANION GAP SERPL CALCULATED.3IONS-SCNC: 6 MMOL/L (ref 4–13)
BUN SERPL-MCNC: 17 MG/DL (ref 5–25)
CALCIUM SERPL-MCNC: 7.9 MG/DL (ref 8.4–10.2)
CHLORIDE SERPL-SCNC: 105 MMOL/L (ref 96–108)
CO2 SERPL-SCNC: 27 MMOL/L (ref 21–32)
CREAT SERPL-MCNC: 1.05 MG/DL (ref 0.6–1.3)
ERYTHROCYTE [DISTWIDTH] IN BLOOD BY AUTOMATED COUNT: 15.7 % (ref 11.6–15.1)
GFR SERPL CREATININE-BSD FRML MDRD: 72 ML/MIN/1.73SQ M
GLUCOSE SERPL-MCNC: 140 MG/DL (ref 65–140)
GLUCOSE SERPL-MCNC: 244 MG/DL (ref 65–140)
GLUCOSE SERPL-MCNC: 262 MG/DL (ref 65–140)
GLUCOSE SERPL-MCNC: 272 MG/DL (ref 65–140)
HCT VFR BLD AUTO: 30.2 % (ref 36.5–49.3)
HGB BLD-MCNC: 9.1 G/DL (ref 12–17)
MCH RBC QN AUTO: 27.2 PG (ref 26.8–34.3)
MCHC RBC AUTO-ENTMCNC: 30.1 G/DL (ref 31.4–37.4)
MCV RBC AUTO: 90 FL (ref 82–98)
PLATELET # BLD AUTO: 199 THOUSANDS/UL (ref 149–390)
PMV BLD AUTO: 11.1 FL (ref 8.9–12.7)
POTASSIUM SERPL-SCNC: 3.9 MMOL/L (ref 3.5–5.3)
RBC # BLD AUTO: 3.34 MILLION/UL (ref 3.88–5.62)
SODIUM SERPL-SCNC: 138 MMOL/L (ref 135–147)
WBC # BLD AUTO: 9.4 THOUSAND/UL (ref 4.31–10.16)

## 2025-05-28 PROCEDURE — 99239 HOSP IP/OBS DSCHRG MGMT >30: CPT | Performed by: INTERNAL MEDICINE

## 2025-05-28 PROCEDURE — 85027 COMPLETE CBC AUTOMATED: CPT | Performed by: INTERNAL MEDICINE

## 2025-05-28 PROCEDURE — 97116 GAIT TRAINING THERAPY: CPT

## 2025-05-28 PROCEDURE — 97530 THERAPEUTIC ACTIVITIES: CPT

## 2025-05-28 PROCEDURE — 80048 BASIC METABOLIC PNL TOTAL CA: CPT | Performed by: INTERNAL MEDICINE

## 2025-05-28 PROCEDURE — 82948 REAGENT STRIP/BLOOD GLUCOSE: CPT

## 2025-05-28 RX ORDER — INSULIN GLARGINE 100 [IU]/ML
4 INJECTION, SOLUTION SUBCUTANEOUS
Qty: 3 ML | Refills: 0 | Status: SHIPPED | OUTPATIENT
Start: 2025-05-28

## 2025-05-28 RX ORDER — INSULIN LISPRO 100 [IU]/ML
2 INJECTION, SOLUTION INTRAVENOUS; SUBCUTANEOUS
Qty: 3 ML | Refills: 0 | Status: SHIPPED | OUTPATIENT
Start: 2025-05-28 | End: 2025-05-28

## 2025-05-28 RX ORDER — POTASSIUM CHLORIDE 1500 MG/1
40 TABLET, EXTENDED RELEASE ORAL DAILY
Qty: 180 TABLET | Refills: 0 | Status: SHIPPED | OUTPATIENT
Start: 2025-05-28

## 2025-05-28 RX ORDER — HYDROCORTISONE 10 MG/1
TABLET ORAL
Qty: 76 TABLET | Refills: 0 | Status: SHIPPED | OUTPATIENT
Start: 2025-05-29

## 2025-05-28 RX ORDER — POTASSIUM CHLORIDE 1500 MG/1
40 TABLET, EXTENDED RELEASE ORAL
Qty: 180 TABLET | Refills: 0 | Status: SHIPPED | OUTPATIENT
Start: 2025-05-28 | End: 2025-05-28

## 2025-05-28 RX ORDER — INSULIN LISPRO 100 [IU]/ML
3 INJECTION, SOLUTION INTRAVENOUS; SUBCUTANEOUS
Qty: 3 ML | Refills: 0 | Status: SHIPPED | OUTPATIENT
Start: 2025-05-28

## 2025-05-28 RX ADMIN — POTASSIUM CHLORIDE 40 MEQ: 1500 TABLET, EXTENDED RELEASE ORAL at 10:23

## 2025-05-28 RX ADMIN — INSULIN LISPRO 2 UNITS: 100 INJECTION, SOLUTION INTRAVENOUS; SUBCUTANEOUS at 12:08

## 2025-05-28 RX ADMIN — INSULIN LISPRO 2 UNITS: 100 INJECTION, SOLUTION INTRAVENOUS; SUBCUTANEOUS at 10:23

## 2025-05-28 RX ADMIN — Medication 1000 UNITS: at 10:23

## 2025-05-28 RX ADMIN — HYDROCORTISONE 10 MG: 10 TABLET ORAL at 12:11

## 2025-05-28 RX ADMIN — HYDROCORTISONE 15 MG: 5 TABLET ORAL at 10:22

## 2025-05-28 RX ADMIN — SENNOSIDES 8.6 MG: 8.6 TABLET, FILM COATED ORAL at 10:22

## 2025-05-28 RX ADMIN — INSULIN LISPRO 2 UNITS: 100 INJECTION, SOLUTION INTRAVENOUS; SUBCUTANEOUS at 12:12

## 2025-05-28 RX ADMIN — SERTRALINE HYDROCHLORIDE 100 MG: 100 TABLET ORAL at 10:23

## 2025-05-28 RX ADMIN — ASPIRIN 81 MG CHEWABLE TABLET 81 MG: 81 TABLET CHEWABLE at 10:22

## 2025-05-28 RX ADMIN — POTASSIUM CHLORIDE 40 MEQ: 1500 TABLET, EXTENDED RELEASE ORAL at 12:11

## 2025-05-28 RX ADMIN — FLUDROCORTISONE ACETATE 0.2 MG: 0.1 TABLET ORAL at 10:22

## 2025-05-28 RX ADMIN — APIXABAN 5 MG: 5 TABLET, FILM COATED ORAL at 10:23

## 2025-05-28 NOTE — PLAN OF CARE
Problem: PAIN - ADULT  Goal: Verbalizes/displays adequate comfort level or baseline comfort level  Description: Interventions:  - Encourage patient to monitor pain and request assistance  - Assess pain using appropriate pain scale  - Administer analgesics as ordered based on type and severity of pain and evaluate response  - Implement non-pharmacological measures as appropriate and evaluate response  - Consider cultural and social influences on pain and pain management  - Notify physician/advanced practitioner if interventions unsuccessful or patient reports new pain  - Educate patient/family on pain management process including their role and importance of  reporting pain   - Provide non-pharmacologic/complimentary pain relief interventions  Outcome: Progressing     Problem: SAFETY ADULT  Goal: Patient will remain free of falls  Description: INTERVENTIONS:  - Educate patient/family on patient safety including physical limitations  - Instruct patient to call for assistance with activity   - Consider consulting OT/PT to assist with strengthening/mobility based on AM PAC & JH-HLM score  - Consult OT/PT to assist with strengthening/mobility   - Keep Call bell within reach  - Keep bed low and locked with side rails adjusted as appropriate  - Keep care items and personal belongings within reach  - Initiate and maintain comfort rounds  - Make Fall Risk Sign visible to staff  - Offer Toileting every 2 Hours, in advance of need  - Initiate/Maintain bed/chair alarm  - Obtain necessary fall risk management equipment: assistive devices  - Apply yellow socks and bracelet for high fall risk patients  - Consider moving patient to room near nurses station  Outcome: Progressing     Problem: INFECTION - ADULT  Goal: Absence or prevention of progression during hospitalization  Description: INTERVENTIONS:  - Assess and monitor for signs and symptoms of infection  - Monitor lab/diagnostic results  - Monitor all insertion sites, i.e.  indwelling lines, tubes, and drains  - Monitor endotracheal if appropriate and nasal secretions for changes in amount and color  - [place] appropriate cooling/warming therapies per order  - Administer medications as ordered  - Instruct and encourage patient and family to use good hand hygiene technique  - Identify and instruct in appropriate isolation precautions for identified infection/condition  Outcome: Progressing     Problem: Prexisting or High Potential for Compromised Skin Integrity  Goal: Skin integrity is maintained or improved  Description: INTERVENTIONS:  - Identify patients at risk for skin breakdown  - Assess and monitor skin integrity including under and around medical devices   - Assess and monitor nutrition and hydration status  - Monitor labs  - Assess for incontinence   - Turn and reposition patient  - Assist with mobility/ambulation  - Relieve pressure over rea prominences   - Avoid friction and shearing  - Provide appropriate hygiene as needed including keeping skin clean and dry  - Evaluate need for skin moisturizer/barrier cream  - Collaborate with interdisciplinary team  - Patient/family teaching  - Consider wound care consult    Assess:  - Review Mode scale daily  - Clean and moisturize skin PRN  - Inspect skin when repositioning, toileting, and assisting with ADLS  - Assess under medical devices such as masimo every 2hrs  - Assess extremities for adequate circulation and sensation     Bed Management:  - Have minimal linens on bed & keep smooth, unwrinkled  - Change linens as needed when moist or perspiring  - Avoid sitting or lying in one position for more than 2 hours while in bed?Keep HOB at 30 degrees   - Toileting:  - Offer bedside commode  - Assess for incontinence every 2hrs  - Use incontinent care products after each incontinent episode such as pads    Activity:  - Mobilize patient 2 times a day  - Encourage activity and walks on unit  - Encourage or provide ROM exercises   -  Turn and reposition patient every 2 Hours  - Use appropriate equipment to lift or move patient in bed  - Instruct/ Assist with weight shifting every 2hrs when out of bed in chair  - Consider limitation of chair time 2 hour intervals    Skin Care:  - Avoid use of baby powder, tape, friction and shearing, hot water or constrictive clothing  - Relieve pressure over bony prominences using pillows/wedges  - Do not massage red bony areas    Next Steps:  - Teach patient strategies to minimize risks such as weight shifting  - Consider consults to  interdisciplinary teams such as wound care  Outcome: Progressing     Problem: NEUROSENSORY - ADULT  Goal: Achieves stable or improved neurological status  Description: INTERVENTIONS  - Monitor and report changes in neurological status  - Monitor vital signs such as temperature, blood pressure, glucose, and any other labs ordered   - Initiate measures to prevent increased intracranial pressure  - Monitor for seizure activity and implement precautions if appropriate      Outcome: Progressing     Problem: NEUROSENSORY - ADULT  Goal: Achieves maximal functionality and self care  Description: INTERVENTIONS  - Monitor swallowing and airway patency with patient fatigue and changes in neurological status  - Encourage and assist patient to increase activity and self care.   - Encourage visually impaired, hearing impaired and aphasic patients to use assistive/communication devices  Outcome: Progressing     Problem: GASTROINTESTINAL - ADULT  Goal: Maintains adequate nutritional intake  Description: INTERVENTIONS:  - Monitor percentage of each meal consumed  - Identify factors contributing to decreased intake, treat as appropriate  - Assist with meals as needed  - Monitor I&O, weight, and lab values if indicated  - Obtain nutrition services referral as needed  Outcome: Progressing     Problem: GENITOURINARY - ADULT  Goal: Absence of urinary retention  Description: INTERVENTIONS:  - Assess  patient’s ability to void and empty bladder  - Monitor I/O  - Bladder scan as needed  - Discuss with physician/AP medications to alleviate retention as needed  - Discuss catheterization for long term situations as appropriate  Outcome: Progressing     Problem: METABOLIC, FLUID AND ELECTROLYTES - ADULT  Goal: Electrolytes maintained within normal limits  Description: INTERVENTIONS:  - Monitor labs and assess patient for signs and symptoms of electrolyte imbalances  - Administer electrolyte replacement as ordered  - Monitor response to electrolyte replacements, including repeat lab results as appropriate  - Instruct patient on fluid and nutrition as appropriate  Outcome: Progressing     Problem: METABOLIC, FLUID AND ELECTROLYTES - ADULT  Goal: Fluid balance maintained  Description: INTERVENTIONS:  - Monitor labs   - Monitor I/O and WT  - Instruct patient on fluid and nutrition as appropriate  - Assess for signs & symptoms of volume excess or deficit  Outcome: Progressing     Problem: METABOLIC, FLUID AND ELECTROLYTES - ADULT  Goal: Glucose maintained within target range  Description: INTERVENTIONS:  - Monitor Blood Glucose as ordered  - Assess for signs and symptoms of hyperglycemia and hypoglycemia  - Administer ordered medications to maintain glucose within target range  - Assess nutritional intake and initiate nutrition service referral as needed  Outcome: Progressing     Problem: HEMATOLOGIC - ADULT  Goal: Maintains hematologic stability  Description: INTERVENTIONS  - Assess for signs and symptoms of bleeding or hemorrhage  - Monitor labs  - Administer supportive blood products/factors as ordered and appropriate  Outcome: Progressing     Problem: MUSCULOSKELETAL - ADULT  Goal: Maintain or return mobility to safest level of function  Description: INTERVENTIONS:  - Assess patient's ability to carry out ADLs; assess patient's baseline for ADL function and identify physical deficits which impact ability to perform  ADLs (bathing, care of mouth/teeth, toileting, grooming, dressing, etc.)  - Assess/evaluate cause of self-care deficits   - Assess range of motion  - Assess patient's mobility  - Assess patient's need for assistive devices and provide as appropriate  - Encourage maximum independence but intervene and supervise when necessary  - Involve family in performance of ADLs  - Assess for home care needs following discharge   - Consider OT consult to assist with ADL evaluation and planning for discharge  - Provide patient education as appropriate  Outcome: Progressing     Problem: Knowledge Deficit  Goal: Patient/family/caregiver demonstrates understanding of disease process, treatment plan, medications, and discharge instructions  Description: Complete learning assessment and assess knowledge base.  Interventions:  - Provide teaching at level of understanding  - Provide teaching via preferred learning methods  Outcome: Progressing     Problem: DISCHARGE PLANNING  Goal: Discharge to home or other facility with appropriate resources  Description: INTERVENTIONS:  - Identify barriers to discharge w/patient and caregiver  - Arrange for needed discharge resources and transportation as appropriate  - Identify discharge learning needs (meds, wound care, etc.)  - Arrange for interpretive services to assist at discharge as needed  - Refer to Case Management Department for coordinating discharge planning if the patient needs post-hospital services based on physician/advanced practitioner order or complex needs related to functional status, cognitive ability, or social support system  Outcome: Progressing

## 2025-05-28 NOTE — ASSESSMENT & PLAN NOTE
Lab Results   Component Value Date    HGBA1C 6.9 (H) 05/21/2025       Recent Labs     05/27/25  1111 05/27/25  1626 05/27/25  2102 05/28/25  0712   POCGLU 146* 177* 184* 140       Blood Sugar Average: Last 72 hrs:  (P) 136.25    Insulin therapy per endocrinology  Monitor Accu-Cheks  Avoid hypoglycemia  Hypoglycemia protocol in place

## 2025-05-28 NOTE — QUICK NOTE
CM called pt's daughter Ofelia, states that she will be picking up pt between 1530 - 1600 today. RN aware.      Jennifer Newman

## 2025-05-28 NOTE — PLAN OF CARE
Problem: INFECTION - ADULT  Goal: Absence or prevention of progression during hospitalization  Description: INTERVENTIONS:  - Assess and monitor for signs and symptoms of infection  - Monitor lab/diagnostic results  - Monitor all insertion sites, i.e. indwelling lines, tubes, and drains  - Monitor endotracheal if appropriate and nasal secretions for changes in amount and color  - [place] appropriate cooling/warming therapies per order  - Administer medications as ordered  - Instruct and encourage patient and family to use good hand hygiene technique  - Identify and instruct in appropriate isolation precautions for identified infection/condition  Outcome: Progressing

## 2025-05-28 NOTE — PATIENT COMMUNICATION
Patients daughter is returning a call. States she was not able to read the above [employer] message and apologized for missing the call. I reviewed the above message per provider. She verbalized understanding of all but is asking to clarify if patient is to continue using his sliding scale for Humalog and should be taking Humalog 3 units TID/AC plus 1u/50mg above 150mg/dL; max 25u /day or if he is to resume Humalog 3 units TID/AC without the use of the sliding scale? States patient could not recall the conversation prior to discharge.  Please advise, thank you. Daughter asked for a callback on her cell phone @ [phone]- she asked if she does not answer to please leave a detailed message. Thank you

## 2025-05-28 NOTE — QUICK NOTE
Was notified that patient has left the  hospital. Attempted to reach out to the phone number on file, and recommend increasing the HumaLog dose. Reached VM, left VM notifying them that I will reach out via [employer] message.

## 2025-05-28 NOTE — PHYSICAL THERAPY NOTE
PHYSICAL THERAPY NOTE          Patient Name: Karson You  Today's Date: 5/28/2025 05/28/25 1231   PT Last Visit   PT Visit Date 05/28/25   Note Type   Note Type Treatment   Pain Assessment   Pain Assessment Tool 0-10   Pain Location/Orientation Orientation: Left;Location: Leg   Pain Onset/Description Onset: Ongoing   Effect of Pain on Daily Activities Increased time   Patient's Stated Pain Goal No pain   Hospital Pain Intervention(s) Repositioned;Ambulation/increased activity   Restrictions/Precautions   Weight Bearing Precautions Per Order Yes   LLE Weight Bearing Per Order WBAT  (from prior notes)   Other Precautions Cognitive;Chair Alarm;Bed Alarm;Multiple lines;Fall Risk;Pain   General   Chart Reviewed Yes   Cognition   Overall Cognitive Status Impaired   Arousal/Participation Responsive   Attention Attends with cues to redirect   Orientation Level Oriented X4   Following Commands Follows one step commands with increased time or repetition   Comments Pt cooperative during session   Subjective   Subjective Agreeable ot mobilize   Bed Mobility   Supine to Sit 3  Moderate assistance   Additional items Assist x 1;HOB elevated;Bedrails;Increased time required;Verbal cues;LE management   Sit to Supine Unable to assess   Additional Comments Pt in bed upon arrival. EOB sitting with close SUP   Transfers   Sit to Stand 4  Minimal assistance   Additional items Assist x 1;Increased time required;Verbal cues   Stand to Sit 4  Minimal assistance   Additional items Assist x 1;Increased time required;Verbal cues;Armrests   Toilet transfer 4  Minimal assistance   Additional items Assist x 1;Increased time required;Verbal cues;Other  (Bedpan)   Additional Comments w/RW- VC +TC for safety   Ambulation/Elevation   Gait pattern Forward Flexion;Narrow RENÉ;Excessively slow;Step to   Gait Assistance 4  Minimal assist   Additional items Assist x  1;Verbal cues   Assistive Device Rolling walker   Distance 3 ft + 40 ft + 35 ft   Ambulation/Elevation Additional Comments Chair follow for safety in hallway. Limited ambulation 2* bowel movement   Balance   Static Sitting Fair +   Dynamic Sitting Fair   Static Standing Fair -   Dynamic Standing Poor +   Ambulatory Poor +   Endurance Deficit   Endurance Deficit Yes   Activity Tolerance   Activity Tolerance Patient limited by fatigue   Medical Staff Made Aware PCA   Nurse Made Aware RN cleared   Assessment   Prognosis Fair   Problem List Decreased strength;Decreased endurance;Impaired balance;Decreased mobility;Pain   Assessment Pt seen for PT treatment session this date. Therapy session focused on bed mobility, functional transfers, and ambulation in order to improve overall mobility and independence. Pt requires mod assist fro bed mobility, min assist for transfers and ambulation . Pt making steady progress toward goals. Pt was left in recliner at the end of PT session with all needs in reach. Pt would benefit from continued PT services while in hospital to address remaining limitations. The patient's [facility] Basic Mobility Inpatient Short Form Raw Score is 16. A Raw score of less than or equal to 16 suggests the patient may benefit from discharge to post-acute rehabilitation services. Please also refer to the recommendation of the Physical Therapist for safe discharge planning.   Goals   Patient Goals none stated   [facility] Expiration Date 06/06/25   PT Treatment Day 1   Plan   Treatment/Interventions Functional transfer training;Therapeutic exercise;Bed mobility;Gait training;Spoke to nursing;Spoke to case management;OT   Progress Progressing toward goals   PT Frequency 2-3x/wk   Discharge Recommendation   Rehab Resource Intensity Level, PT   (pending progress + level of assist at home.)   [facility] Basic Mobility Inpatient   Turning in Flat Bed Without Bedrails 3   Lying on Back to Sitting on Edge of Flat Bed Without  Bedrails 2   Moving Bed to Chair 3   Standing Up From Chair Using Arms 3   Walk in Room 3   Climb 3-5 Stairs With Railing 2   Basic Mobility Inpatient Raw Score 16   Basic Mobility Standardized Score 38.32   [facility] Highest Level Of Mobility   -HL Goal 5: Stand one or more mins   -HLM Achieved 7: Walk 25 feet or more   Education   Education Provided Mobility training;Assistive device   Patient Reinforcement needed;Demonstrates verbal understanding   End of Consult   Patient Position at End of Consult All needs within reach;Bed/Chair alarm activated;Bedside chair   Julieth Garcia PT DPT

## 2025-05-28 NOTE — PROGRESS NOTES
"Progress Note - Hospitalist   Name: Karson You 70 y.o. male I MRN: [id number]  Unit/Bed#: [facility] 815-01 I Date of Admission: 5/21/2025   Date of Service: 5/28/2025 I Hospital Day: 6   { ?Quick Links I Problem List I PORCH I Billing Tip:42764}  Assessment & Plan  Syncope and collapse  Patient presents syncope and collapse noted to have hypoglycemia with glucose of 40  Likely multifactorial  Patient with history of internal insufficiency and orthostatic hypotension  2D echo EF 64% grade 2 diastolic dysfunction, aortic valve sclerosis reported  Received IV fluids  Symptomatically improved  Safe ambulation discussed and encouraged  Supportive cares  Paroxysmal A-fib ([facility])  Continue Eliquis for anticoagulation  Adrenal insufficiency ([facility])  History of renal insufficiency  Continue hydrocortisone 15 mg a.m., 10 mg p.m.  Endocrinology input noted  Outpatient follow-up  Hypoglycemia  Present on admission glucose of 40  Hypoglycemia resolved  Encourage adequate p.o. intake  Accu-Chek monitoring and hypoglycemia remedial measures encouraged  Orthostatic hypotension  History of orthostatic hypotension  Continue midodrine 5 mg 3 times daily  Continue fludrocortisone 0.2 mg p.o. daily  Encourage abdominal binder, compression stockings  Safe ambulation encouraged  Avoid hypotension    History of cerebrovascular accident (CVA) in adulthood  Continue aspirin, atorvastatin  Iron deficiency anemia  Monitor hemoglobin    No results for input(s): \"HGB\" in the last 72 hours.      CKD (chronic kidney disease) stage 5, GFR less than 15 ml/min ([facility])  Lab Results   Component Value Date    EGFR 88 05/27/2025    EGFR 75 05/26/2025    EGFR 74 05/25/2025    CREATININE 0.88 05/27/2025    CREATININE 1.02 05/26/2025    CREATININE 1.03 05/25/2025   Baseline creatinine 0.9-1.1  Monitor kidney function  SIRS (systemic inflammatory response syndrome) ([facility])  SIRS present on admission  RSV/influenza/COVID-negative  Blood cultures negative x 2 at 48 " hours  CT chest abdomen pelvis no acute intrathoracic intra-abdominal findings  SIRS resolved  Chronic heart failure with preserved ejection fraction (HCC)  Wt Readings from Last 3 Encounters:   05/22/25 87.1 kg (192 lb)       2D echo reveals EF 64%, grade 2 diastolic dysfunction  Continue low-salt diet  Outpatient follow-up  PAD (peripheral artery disease) (HCC)  Continue aspirin, atorvastatin  DISH (diffuse idiopathic skeletal hyperostosis)  Noted on admission CT abdomen pelvis as part of trauma workup  Outpatient follow-up  Hypocalcemia  Noted on admission with corrected level of 7.5.  Likely in the setting of CKD 5, as well as vitamin D deficiency.  Monitor  Hypokalemia  Potassium 2.9  Replete  Reports taking 40 mEq potassium twice daily at home  Continue potassium 40 mill equivalents increased to 3 times daily dosing  Monitor potassium levels    Type 1 diabetes mellitus (HCC)  Lab Results   Component Value Date    HGBA1C 6.9 (H) 05/21/2025       Recent Labs     05/27/25  1111 05/27/25  1626 05/27/25  2102 05/28/25  0712   POCGLU 146* 177* 184* 140       Blood Sugar Average: Last 72 hrs:  (P) 136.25    Insulin therapy per endocrinology  Monitor Accu-Cheks  Avoid hypoglycemia  Hypoglycemia protocol in place    Hypomagnesemia  Magnesium 1.8  Replete  Monitor  VTE Pharmacologic Prophylaxis:   Pharmacologic: {VTE Prophylaxis Meds:50512}  Mechanical VTE Prophylaxis in Place: {Yes or No:28221}    Patient Centered Rounds: {Patient Centered Rounds:00620}    Discussions with Specialists or Other Care Team Provider: ***    Education and Discussions with Family / Patient: ***    Time Spent for Care: {Time; Time 15 min - 1 hour:24230}.  More than 50% of total time spent on counseling and coordination of care as described above.    Current Length of Stay: 6 day(s)    Current Patient Status: Inpatient   Certification Statement: {Certification Statement:19596}    Discharge Plan: ***    Code Status: Level 1 - Full  Code      Subjective:   ***    Objective:     Vitals:   Temp (24hrs), Av.9 °F (36.6 °C), Min:97.8 °F (36.6 °C), Max:98 °F (36.7 °C)    Temp:  [97.8 °F (36.6 °C)-98 °F (36.7 °C)] 98 °F (36.7 °C)  HR:  [71-76] 73  Resp:  [16-20] 17  BP: (149-173)/(78-95) 173/93  SpO2:  [96 %-99 %] 96 %  There is no height or weight on file to calculate BMI.     Input and Output Summary (last 24 hours):       Intake/Output Summary (Last 24 hours) at 2025 0946  Last data filed at 2025 0800  Gross per 24 hour   Intake 1360 ml   Output 2790 ml   Net -1430 ml       Physical Exam:     Physical Exam    Additional Data:     Labs:    Results from last 7 days   Lab Units 25  0522   WBC Thousand/uL 10.11   HEMOGLOBIN g/dL 9.7*   HEMATOCRIT % 30.6*   PLATELETS Thousands/uL 180   SEGS PCT % 72   LYMPHO PCT % 16   MONO PCT % 10   EOS PCT % 1     Results from last 7 days   Lab Units 25  0459 25  0537 25  1521 25  1511   POTASSIUM mmol/L 3.0*   < > 2.8*  --    CHLORIDE mmol/L 107   < > 101  --    CO2 mmol/L 28   < > 28  --    CO2, I-STAT mmol/L  --   --   --  33*   BUN mg/dL 17   < > 11  --    CREATININE mg/dL 0.88   < > 1.10  --    CALCIUM mg/dL 7.7*   < > 7.0*  --    ALK PHOS U/L  --   --  136*  --    ALT U/L  --   --  13  --    AST U/L  --   --  34  --    GLUCOSE, ISTAT mg/dl  --   --   --  50*    < > = values in this interval not displayed.     Results from last 7 days   Lab Units 25  1521   INR  1.63*       * I Have Reviewed All Lab Data Listed Above.  * Additional Pertinent Lab Tests Reviewed: {Labreview:58777}    Imaging:    Imaging Reports Reviewed Today Include: ***  Imaging Personally Reviewed by Myself Includes:  ***    Recent Cultures (last 7 days):     Results from last 7 days   Lab Units 25  1722   BLOOD CULTURE  No Growth After 5 Days.  No Growth After 5 Days.       Last 24 Hours Medication List:   Current Facility-Administered Medications   Medication Dose Route Frequency  Provider Last Rate    acetaminophen  650 mg Oral Q6H PRN Miguelito Martinez MD      aluminum-magnesium hydroxide-simethicone  30 mL Oral Q6H PRN Faiza Casas MD      apixaban  5 mg Oral BID Faiza Casas MD      aspirin  81 mg Oral Daily Faiza Casas MD      atorvastatin  10 mg Oral Daily With Dinner Faiza Casas MD      cholecalciferol  1,000 Units Oral Daily Faiza Casas MD      fludrocortisone  0.2 mg Oral Daily Faiza Casas MD      hydrocortisone  15 mg Oral QAM Zhane Porter MD      And    hydrocortisone  10 mg Oral Daily With Lunch Zhane Porter MD      insulin glargine  4 Units Subcutaneous HS Josh Stinson MD      insulin lispro  1-5 Units Subcutaneous TID AC Zhane Porter MD      insulin lispro  1-5 Units Subcutaneous HS Zhane Porter MD      insulin lispro  2 Units Subcutaneous TID With Meals Zhane Porter MD      midodrine  5 mg Oral TID AC Faiza Casas MD      ondansetron  4 mg Intravenous Q6H PRN Faiza Casas MD      potassium chloride  40 mEq Oral TID With Meals Miguelito Martinez MD      senna  1 tablet Oral Daily Faiza Casas MD      sertraline  100 mg Oral Daily Faiza Casas MD          Today, Patient Was Seen By: Mu Blanodn DO    ** Please Note: Dictation voice to text software may have been used in the creation of this document. **

## 2025-05-28 NOTE — QUICK NOTE
Patient not seen in person, chart thoroughly reviewed.   Recent Labs     05/28/25  0712 05/28/25  1049 05/28/25  1110 05/28/25  1423   POCGLU 140  --  244* 272*   GLUC  --  262*  --   --      Noted to have fasting BG at goal. Had BG of 262 however noted that it was checked less than an hour after patient received insulin. Received 4 units of HumaLog before lunch.   Due to concern for low BG discussed with RN to get fingerstick around 2:30. BG remains mid-high 200s so he will likely need more mealtime insulin.     Recommend continuing Lantus 4 units nightly  Recommend increasing HumaLog to 3 units before meals  If patient is being discharged, can go home on above regimen.   Continue monitoring BG before meals and at bedtime.

## 2025-06-02 ENCOUNTER — TELEPHONE (OUTPATIENT)
Age: 70
End: 2025-06-02

## 2025-06-02 NOTE — TELEPHONE ENCOUNTER
Patients daughter called stating since his insulin doses have been changed in the hospital his BG is very high at home. She reports his AM this morning was over 300 and right now his FS is 406, denies any symptoms. She states they decreased his Toujeo from 14 units at bedtime to 4 units at bedtime and his humalog is 4 units tid/ac + scale. He is wearing his Dexcom. She is asking if they can go back tot he 14 units of Toujeo as his morning BG is high  Please advise and call today with adjustments

## 2025-06-12 ENCOUNTER — TRANSITIONAL CARE MANAGEMENT (OUTPATIENT)
Dept: FAMILY MEDICINE CLINIC | Facility: CLINIC | Age: 70
End: 2025-06-12

## 2025-06-21 DIAGNOSIS — S72.002D CLOSED FRACTURE OF LEFT HIP WITH ROUTINE HEALING, SUBSEQUENT ENCOUNTER: Primary | ICD-10-CM

## 2025-07-01 ENCOUNTER — TELEPHONE (OUTPATIENT)
Age: 70
End: 2025-07-01

## 2025-07-01 NOTE — TELEPHONE ENCOUNTER
Please advise daughter, patient is prescribed midodrine and follows with cardiology. He should be taking 5 mg three times daily and follow-up  with cardiology if BP  remains low.

## 2025-07-01 NOTE — TELEPHONE ENCOUNTER
Patricia from [facility] called wanting to make provider aware that she saw Karson today and during visit his blood pressure was 76/51, not showing any signs of hypotension, is alert, does not appear weak however, did not get him up to walk, stated that he does not have any complaints of dizziness, stated that he has lost 10 pounds within 3 weeks, stated that daughter is concerned and wanted the provider aware.    Will be seeing Karson next week.         patricia [phone]

## 2025-07-02 DIAGNOSIS — R55 SYNCOPE: ICD-10-CM

## 2025-07-02 RX ORDER — POTASSIUM CHLORIDE 1500 MG/1
40 TABLET, EXTENDED RELEASE ORAL DAILY
Qty: 180 TABLET | Refills: 1 | Status: SHIPPED | OUTPATIENT
Start: 2025-07-02

## 2025-07-02 NOTE — TELEPHONE ENCOUNTER
Medication: potassium    Dose/Frequency: 10mEq/2 tablets daily    Quantity: 180    Pharmacy: [facility]/pharmacy #[id number] - [place], [place] - [address]     Office:   [] PCP/Provider -   [x] Speciality/Provider - cardiology/Dominique Reza    Does the patient have enough for 3 days?   [] Yes   [x] No - Send as HP to POD    Pt doesn't use [facility]. Please send to [facility]

## 2025-07-02 NOTE — TELEPHONE ENCOUNTER
Called pt and advised, Cardiologist said take med as needed. It is much better today after taking the medication. BP was 130/70. Pt daughter says her father takes potassium it needs to be refilled, he hasn't had it and think that might have impacted his BP cause that has happened before where his potassium was low and it cause his BP to be low.

## 2025-07-03 ENCOUNTER — TELEPHONE (OUTPATIENT)
Age: 70
End: 2025-07-03

## 2025-07-03 NOTE — TELEPHONE ENCOUNTER
[facility] home [facility] will be faxing in an order for patient for PT and home care. Please assist [facility] to get the document sign by the doctor and faxed back. Thank you

## 2025-07-10 DIAGNOSIS — I50.32 CHRONIC DIASTOLIC HEART FAILURE (HCC): ICD-10-CM

## 2025-07-11 RX ORDER — POTASSIUM CHLORIDE 750 MG/1
10 TABLET, EXTENDED RELEASE ORAL DAILY
Qty: 90 TABLET | Refills: 1 | Status: SHIPPED | OUTPATIENT
Start: 2025-07-11

## 2025-07-18 ENCOUNTER — TELEPHONE (OUTPATIENT)
Age: 70
End: 2025-07-18

## 2025-07-18 NOTE — TELEPHONE ENCOUNTER
Patient's daughter called stating that when he was prescribed his insulin this last time around, it came in a vial instead of the pens that he usually gets. So he has been using her syringes that she had but now she is running low so he needs his own prescription for disposable syringes and needles for his insulin.      Please advise.      She would like a call back.      [facility] [address] [place], [place]

## 2025-07-19 NOTE — TELEPHONE ENCOUNTER
Pt daughter called. She is sick and brings the pt to his appts.  Can they do a virtual appt tomorrow? Please let her know/.   
No

## 2025-07-21 DIAGNOSIS — I48.0 PAROXYSMAL A-FIB (HCC): ICD-10-CM

## 2025-07-21 NOTE — TELEPHONE ENCOUNTER
Pt contacted Call Center requested refill of their medication.        Doctor Name: Blanca Fox/      Medication Name: Apixaban (ELIQUIS)      Dosage of Med: 5mg      Frequency of Med: 2 times daily      Remaining Medication: None      Pharmacy and Location: [address] [place]         Pt. Preferred Callback Phone Number: [phone]      Thank you.      PLEASE ADVISE PATIENTS:    REFILL REQUESTS WILL BE PROCESSED WITHIN 24-48 HOURS.

## 2025-07-21 NOTE — TELEPHONE ENCOUNTER
Pts daughter called again     the patient is out of the syringes for the vials that were called in mistakenly  can u please call in syringes he will be using 3 daily,    Also needs the fludrocortisone 0.1 mg tab 2 tabs daily to [facility] [address], [place]

## 2025-07-22 NOTE — TELEPHONE ENCOUNTER
Patient's daughter called stating that her father is still waiting for his syringes.    She would like a call back once this is done.

## 2025-07-23 NOTE — TELEPHONE ENCOUNTER
Patient's daughter Ofelia called again today inquiring about her father's prescriptions.  Daughter said she has been calling for days and he still hasn't received them. I reached out to my manager to see if she could help resolve this matter.

## 2025-07-24 ENCOUNTER — DOCUMENTATION (OUTPATIENT)
Dept: ENDOCRINOLOGY | Facility: CLINIC | Age: 70
End: 2025-07-24

## 2025-07-24 DIAGNOSIS — E10.65 TYPE 1 DIABETES MELLITUS WITH HYPERGLYCEMIA (HCC): Primary | ICD-10-CM

## 2025-07-24 RX ORDER — PEN NEEDLE, DIABETIC 32GX 5/32"
NEEDLE, DISPOSABLE MISCELLANEOUS
Qty: 200 EACH | Refills: 5 | Status: SHIPPED | OUTPATIENT
Start: 2025-07-24

## 2025-07-30 ENCOUNTER — APPOINTMENT (EMERGENCY)
Dept: RADIOLOGY | Facility: HOSPITAL | Age: 70
DRG: 071 | End: 2025-07-30
Payer: COMMERCIAL

## 2025-07-30 ENCOUNTER — HOSPITAL ENCOUNTER (INPATIENT)
Facility: HOSPITAL | Age: 70
LOS: 5 days | Discharge: HOME HEALTH | DRG: 071 | End: 2025-08-04
Attending: EMERGENCY MEDICINE | Admitting: INTERNAL MEDICINE
Payer: COMMERCIAL

## 2025-07-30 PROBLEM — R41.89 COGNITIVE IMPAIRMENT: Status: ACTIVE | Noted: 2025-07-30

## 2025-07-31 ENCOUNTER — APPOINTMENT (INPATIENT)
Dept: RADIOLOGY | Facility: HOSPITAL | Age: 70
DRG: 071 | End: 2025-07-31
Payer: COMMERCIAL

## 2025-07-31 PROBLEM — N18.31 STAGE 3A CHRONIC KIDNEY DISEASE (HCC): Status: ACTIVE | Noted: 2025-02-07

## 2025-07-31 PROBLEM — R78.81 GRAM-POSITIVE BACTEREMIA: Status: ACTIVE | Noted: 2025-07-31

## 2025-07-31 PROBLEM — E10.11 DIABETIC KETOACIDOSIS WITH COMA ASSOCIATED WITH TYPE 1 DIABETES MELLITUS (HCC): Status: ACTIVE | Noted: 2025-07-31

## 2025-07-31 PROBLEM — E10.10 DIABETIC KETOACIDOSIS WITHOUT COMA ASSOCIATED WITH TYPE 1 DIABETES MELLITUS (HCC): Status: ACTIVE | Noted: 2025-07-31

## 2025-08-01 ENCOUNTER — APPOINTMENT (INPATIENT)
Dept: RADIOLOGY | Facility: HOSPITAL | Age: 70
DRG: 071 | End: 2025-08-01
Payer: COMMERCIAL

## 2025-08-01 ENCOUNTER — TELEPHONE (OUTPATIENT)
Age: 70
End: 2025-08-01

## 2025-08-01 ENCOUNTER — APPOINTMENT (INPATIENT)
Dept: NEUROLOGY | Facility: CLINIC | Age: 70
DRG: 071 | End: 2025-08-01
Attending: NURSE PRACTITIONER
Payer: COMMERCIAL

## 2025-08-01 PROBLEM — E10.10 DIABETIC KETOACIDOSIS WITHOUT COMA ASSOCIATED WITH TYPE 1 DIABETES MELLITUS (HCC): Status: RESOLVED | Noted: 2025-07-31 | Resolved: 2025-08-01

## 2025-08-01 PROBLEM — S91.302A WOUND OF LEFT FOOT: Status: ACTIVE | Noted: 2025-08-01

## 2025-08-01 PROBLEM — S91.301A WOUND OF RIGHT FOOT: Status: ACTIVE | Noted: 2025-08-01

## 2025-08-02 ENCOUNTER — APPOINTMENT (INPATIENT)
Dept: NON INVASIVE DIAGNOSTICS | Facility: HOSPITAL | Age: 70
DRG: 071 | End: 2025-08-02
Payer: COMMERCIAL

## 2025-08-03 ENCOUNTER — APPOINTMENT (INPATIENT)
Dept: NON INVASIVE DIAGNOSTICS | Facility: HOSPITAL | Age: 70
DRG: 071 | End: 2025-08-03
Payer: COMMERCIAL

## 2025-08-04 DIAGNOSIS — R55 SYNCOPE: ICD-10-CM

## 2025-08-04 PROBLEM — R23.4 ESCHAR OF FOOT: Status: ACTIVE | Noted: 2025-08-04

## 2025-08-05 ENCOUNTER — TELEPHONE (OUTPATIENT)
Dept: FAMILY MEDICINE CLINIC | Facility: CLINIC | Age: 70
End: 2025-08-05

## 2025-08-05 ENCOUNTER — TRANSITIONAL CARE MANAGEMENT (OUTPATIENT)
Dept: FAMILY MEDICINE CLINIC | Facility: CLINIC | Age: 70
End: 2025-08-05

## 2025-08-05 RX ORDER — INSULIN GLARGINE 300 U/ML
5 INJECTION, SOLUTION SUBCUTANEOUS DAILY
Refills: 0 | OUTPATIENT
Start: 2025-08-05

## 2025-08-07 ENCOUNTER — HOSPITAL ENCOUNTER (EMERGENCY)
Facility: HOSPITAL | Age: 70
Discharge: HOME | End: 2025-08-07
Attending: EMERGENCY MEDICINE
Payer: COMMERCIAL

## 2025-08-07 ENCOUNTER — VBI (OUTPATIENT)
Dept: ADMINISTRATIVE | Facility: OTHER | Age: 70
End: 2025-08-07

## 2025-08-08 ENCOUNTER — PATIENT OUTREACH (OUTPATIENT)
Dept: CASE MANAGEMENT | Facility: HOSPITAL | Age: 70
End: 2025-08-08

## 2025-08-08 ENCOUNTER — PATIENT OUTREACH (OUTPATIENT)
Dept: CASE MANAGEMENT | Facility: OTHER | Age: 70
End: 2025-08-08

## 2025-08-08 ENCOUNTER — VBI (OUTPATIENT)
Dept: FAMILY MEDICINE CLINIC | Facility: CLINIC | Age: 70
End: 2025-08-08

## 2025-08-15 ENCOUNTER — VBI (OUTPATIENT)
Dept: ADMINISTRATIVE | Facility: OTHER | Age: 70
End: 2025-08-15

## 2025-08-20 ENCOUNTER — TELEPHONE (OUTPATIENT)
Dept: LAB | Facility: HOSPITAL | Age: 70
End: 2025-08-20

## (undated) DEVICE — MAT ABSORBANT ARTHROSCOPY FLOOR 46 X 40 IN

## (undated) DEVICE — SPONGE SCRUB 4 PCT CHLORHEXIDINE

## (undated) DEVICE — Device

## (undated) DEVICE — 2.5MM REAMING ROD WITH BALL TIP/950MM-STERILE
Type: IMPLANTABLE DEVICE | Site: FEMUR | Status: NON-FUNCTIONAL
Removed: 2025-02-15

## (undated) DEVICE — DRESSING MEPILEX AG BORDER 4 X 4 IN

## (undated) DEVICE — STERILE ORIF HIP PACK: Brand: CARDINAL HEALTH

## (undated) DEVICE — C-ARM: Brand: UNBRANDED

## (undated) DEVICE — PROXIMATE SKIN STAPLERS (35 WIDE) CONTAINS 35 STAINLESS STEEL STAPLES (FIXED HEAD): Brand: PROXIMATE

## (undated) DEVICE — SUT VICRYL PLUS 0 CTB-1 27 IN VCPB260H

## (undated) DEVICE — POSITIONER HANA TABLE PACK

## (undated) DEVICE — 6617 IOBAN II PATIENT ISOLATION DRAPE 5/BX,4BX/CS: Brand: STERI-DRAPE™ IOBAN™ 2

## (undated) DEVICE — COBAN 4 IN STERILE

## (undated) DEVICE — 3.2MM GUIDE WIRE 400MM

## (undated) DEVICE — 4.2MM THREE-FLUTED DRILL BIT QC/NEEDLE POINT/145MM-STERILE

## (undated) DEVICE — SUT VICRYL PLUS 2-0 CTB-1 27 IN VCPB259H

## (undated) DEVICE — GLOVE SRG BIOGEL ORTHOPEDIC 8

## (undated) DEVICE — GLOVE INDICATOR PI UNDERGLOVE SZ 8 BLUE

## (undated) DEVICE — DRAPE C-ARMOUR

## (undated) DEVICE — DRESSING MEPILEX AG BORDER POST-OP 4 X 6 IN

## (undated) DEVICE — PAD CAST 4 IN COTTON NON STERILE

## (undated) DEVICE — CHLORAPREP HI-LITE 26ML ORANGE